# Patient Record
Sex: FEMALE | Race: BLACK OR AFRICAN AMERICAN | NOT HISPANIC OR LATINO | Employment: OTHER | URBAN - METROPOLITAN AREA
[De-identification: names, ages, dates, MRNs, and addresses within clinical notes are randomized per-mention and may not be internally consistent; named-entity substitution may affect disease eponyms.]

---

## 2017-01-06 DIAGNOSIS — M62.838 MUSCLE SPASM: ICD-10-CM

## 2017-01-06 RX ORDER — TIZANIDINE HYDROCHLORIDE 4 MG/1
1 CAPSULE, GELATIN COATED ORAL 2 TIMES DAILY PRN
Qty: 60 CAPSULE | Refills: 0 | Status: SHIPPED | OUTPATIENT
Start: 2017-01-06 | End: 2017-02-16

## 2017-01-06 NOTE — TELEPHONE ENCOUNTER
Two refill requests for tizanidine rec'd this morning; none recently previous to this.  Refill sent in, please let patient know. I do recommend that she use this with caution as with renal disease it is not cleared out of her system as well.

## 2017-01-06 NOTE — TELEPHONE ENCOUNTER
----- Message from Radha Ramon sent at 1/6/2017 10:05 AM CST -----  Contact: self/[phone]  Pt called regards to checking the status of her Rx for tizanidine 4 mg Cap. She is in bad pain. She need her med's.      Please advise

## 2017-01-09 ENCOUNTER — ANTI-COAG VISIT (OUTPATIENT)
Dept: CARDIOLOGY | Facility: CLINIC | Age: 57
End: 2017-01-09

## 2017-01-09 ENCOUNTER — LAB VISIT (OUTPATIENT)
Dept: LAB | Facility: HOSPITAL | Age: 57
End: 2017-01-09
Attending: INTERNAL MEDICINE
Payer: MEDICARE

## 2017-01-09 DIAGNOSIS — Z79.01 LONG-TERM (CURRENT) USE OF ANTICOAGULANTS: ICD-10-CM

## 2017-01-09 DIAGNOSIS — I48.19 PERSISTENT ATRIAL FIBRILLATION: ICD-10-CM

## 2017-01-09 DIAGNOSIS — I48.0 PAROXYSMAL ATRIAL FIBRILLATION: ICD-10-CM

## 2017-01-09 LAB
ANION GAP SERPL CALC-SCNC: 17 MMOL/L
APTT BLDCRRT: 34.6 SEC
BASOPHILS # BLD AUTO: 0.01 K/UL
BASOPHILS NFR BLD: 0.1 %
BUN SERPL-MCNC: 55 MG/DL
CALCIUM SERPL-MCNC: 9.4 MG/DL
CHLORIDE SERPL-SCNC: 90 MMOL/L
CO2 SERPL-SCNC: 28 MMOL/L
CREAT SERPL-MCNC: 9.2 MG/DL
DIFFERENTIAL METHOD: ABNORMAL
EOSINOPHIL # BLD AUTO: 0 K/UL
EOSINOPHIL NFR BLD: 0.4 %
ERYTHROCYTE [DISTWIDTH] IN BLOOD BY AUTOMATED COUNT: 14.6 %
EST. GFR  (AFRICAN AMERICAN): 5 ML/MIN/1.73 M^2
EST. GFR  (NON AFRICAN AMERICAN): 4.3 ML/MIN/1.73 M^2
GIANT PLATELETS BLD QL SMEAR: PRESENT
GLUCOSE SERPL-MCNC: 289 MG/DL
HCT VFR BLD AUTO: 36.3 %
HGB BLD-MCNC: 11.3 G/DL
INR PPP: 4.6
LYMPHOCYTES # BLD AUTO: 1.4 K/UL
LYMPHOCYTES NFR BLD: 15.1 %
MCH RBC QN AUTO: 30.5 PG
MCHC RBC AUTO-ENTMCNC: 31.1 %
MCV RBC AUTO: 98 FL
MONOCYTES # BLD AUTO: 0.6 K/UL
MONOCYTES NFR BLD: 6.4 %
NEUTROPHILS # BLD AUTO: 7.2 K/UL
NEUTROPHILS NFR BLD: 78 %
PLATELET # BLD AUTO: 297 K/UL
PLATELET BLD QL SMEAR: ABNORMAL
PMV BLD AUTO: 11.9 FL
POTASSIUM SERPL-SCNC: 4.7 MMOL/L
PROTHROMBIN TIME: 47.5 SEC
RBC # BLD AUTO: 3.7 M/UL
SODIUM SERPL-SCNC: 135 MMOL/L
WBC # BLD AUTO: 9.35 K/UL

## 2017-01-09 PROCEDURE — 80048 BASIC METABOLIC PNL TOTAL CA: CPT

## 2017-01-09 PROCEDURE — 36415 COLL VENOUS BLD VENIPUNCTURE: CPT

## 2017-01-09 PROCEDURE — 85025 COMPLETE CBC W/AUTO DIFF WBC: CPT

## 2017-01-09 PROCEDURE — 85610 PROTHROMBIN TIME: CPT

## 2017-01-09 PROCEDURE — 85730 THROMBOPLASTIN TIME PARTIAL: CPT

## 2017-01-10 ENCOUNTER — TELEPHONE (OUTPATIENT)
Dept: ELECTROPHYSIOLOGY | Facility: CLINIC | Age: 57
End: 2017-01-10

## 2017-01-10 NOTE — TELEPHONE ENCOUNTER
Nurse called and spoke to Ms Medel regarding her inr being 4/6 yesterday.  Ms Medel informed nurse that per instructions her last dose of coumadin was 1/7/17. Per Dr Adorno, will redraw inr tomorrow, before procedure, if inr >3 will have to reschedule again. Patient verbalized understanding.

## 2017-01-11 ENCOUNTER — ANTI-COAG VISIT (OUTPATIENT)
Dept: CARDIOLOGY | Facility: CLINIC | Age: 57
End: 2017-01-11

## 2017-01-11 DIAGNOSIS — I48.0 PAROXYSMAL ATRIAL FIBRILLATION: ICD-10-CM

## 2017-01-11 DIAGNOSIS — Z79.01 LONG-TERM (CURRENT) USE OF ANTICOAGULANTS: ICD-10-CM

## 2017-01-11 PROBLEM — I48.19 ATRIAL FIBRILLATION, PERSISTENT: Status: ACTIVE | Noted: 2017-01-11

## 2017-01-11 PROBLEM — N18.6 ESRD (END STAGE RENAL DISEASE): Status: ACTIVE | Noted: 2017-01-11

## 2017-01-13 DIAGNOSIS — Z79.01 LONG-TERM (CURRENT) USE OF ANTICOAGULANTS: ICD-10-CM

## 2017-01-13 DIAGNOSIS — I48.0 PAROXYSMAL ATRIAL FIBRILLATION: ICD-10-CM

## 2017-01-13 RX ORDER — WARFARIN SODIUM 5 MG/1
TABLET ORAL
Qty: 40 TABLET | Refills: 0 | Status: SHIPPED | OUTPATIENT
Start: 2017-01-13 | End: 2017-01-13 | Stop reason: SDUPTHER

## 2017-01-13 RX ORDER — WARFARIN SODIUM 5 MG/1
TABLET ORAL
Qty: 189 TABLET | Refills: 0 | Status: SHIPPED | OUTPATIENT
Start: 2017-01-13 | End: 2017-04-03 | Stop reason: SDUPTHER

## 2017-01-18 ENCOUNTER — CLINICAL SUPPORT (OUTPATIENT)
Dept: ELECTROPHYSIOLOGY | Facility: CLINIC | Age: 57
End: 2017-01-18
Payer: MEDICARE

## 2017-01-18 ENCOUNTER — ANTI-COAG VISIT (OUTPATIENT)
Dept: CARDIOLOGY | Facility: CLINIC | Age: 57
End: 2017-01-18
Payer: MEDICARE

## 2017-01-18 DIAGNOSIS — I48.0 PAROXYSMAL ATRIAL FIBRILLATION: ICD-10-CM

## 2017-01-18 DIAGNOSIS — Z79.01 LONG-TERM (CURRENT) USE OF ANTICOAGULANTS: Primary | ICD-10-CM

## 2017-01-18 DIAGNOSIS — I48.19 PERSISTENT ATRIAL FIBRILLATION: ICD-10-CM

## 2017-01-18 LAB
CTP QC/QA: NORMAL
INR PPP: 4.5 (ref 2–3)

## 2017-01-18 PROCEDURE — 93279 PRGRMG DEV EVAL PM/LDLS PM: CPT | Mod: S$GLB,,, | Performed by: INTERNAL MEDICINE

## 2017-01-18 PROCEDURE — 85610 PROTHROMBIN TIME: CPT | Mod: QW,S$GLB,, | Performed by: PHARMACIST

## 2017-01-18 PROCEDURE — 99211 OFF/OP EST MAY X REQ PHY/QHP: CPT | Mod: 25,S$GLB,, | Performed by: PHARMACIST

## 2017-01-18 NOTE — PROGRESS NOTES
INR elevated today.  Patient denies changes except completing course of keflex which does not typically cause DDI with warfarin.  She reports BRBPR with bowel movements yesterday, reports seen on tissue after wiping.  Hold and lower warfarin dose.  I advised patient to monitor bleeding closely and to call us with any increase in amount of blood or if dark in color.  She verbalized understanding.  Repeat INR next week.  Patient advised by student, Aaron, to contact clinic with any changes, questions, or concerns.

## 2017-01-20 ENCOUNTER — HOSPITAL ENCOUNTER (OUTPATIENT)
Dept: PULMONOLOGY | Facility: CLINIC | Age: 57
Discharge: HOME | End: 2017-01-20
Payer: MEDICARE

## 2017-01-20 ENCOUNTER — OFFICE VISIT (OUTPATIENT)
Dept: PULMONOLOGY | Facility: CLINIC | Age: 57
End: 2017-01-20
Payer: MEDICARE

## 2017-01-20 VITALS
HEART RATE: 66 BPM | RESPIRATION RATE: 16 BRPM | OXYGEN SATURATION: 98 % | BODY MASS INDEX: 41.07 KG/M2 | HEIGHT: 68 IN | SYSTOLIC BLOOD PRESSURE: 110 MMHG | WEIGHT: 271 LBS | DIASTOLIC BLOOD PRESSURE: 56 MMHG

## 2017-01-20 DIAGNOSIS — D86.0 SARCOIDOSIS OF LUNG: ICD-10-CM

## 2017-01-20 DIAGNOSIS — Z99.2 ESRD ON HEMODIALYSIS: Chronic | ICD-10-CM

## 2017-01-20 DIAGNOSIS — D86.9 SARCOID: Chronic | ICD-10-CM

## 2017-01-20 DIAGNOSIS — N18.6 ESRD ON HEMODIALYSIS: Chronic | ICD-10-CM

## 2017-01-20 DIAGNOSIS — I50.42 CHRONIC COMBINED SYSTOLIC AND DIASTOLIC HEART FAILURE: Primary | Chronic | ICD-10-CM

## 2017-01-20 DIAGNOSIS — K21.00 GASTROESOPHAGEAL REFLUX DISEASE WITH ESOPHAGITIS: ICD-10-CM

## 2017-01-20 PROBLEM — K21.9 GERD (GASTROESOPHAGEAL REFLUX DISEASE): Status: ACTIVE | Noted: 2017-01-20

## 2017-01-20 LAB
PRE FEV1 FVC: 84
PRE FEV1: 1.66
PRE FVC: 1.97
PREDICTED FEV1 FVC: 79
PREDICTED FEV1: 2.83
PREDICTED FVC: 3.54

## 2017-01-20 PROCEDURE — 94729 DIFFUSING CAPACITY: CPT | Mod: S$GLB,,, | Performed by: INTERNAL MEDICINE

## 2017-01-20 PROCEDURE — 99999 PR PBB SHADOW E&M-EST. PATIENT-LVL III: CPT | Mod: PBBFAC,,, | Performed by: INTERNAL MEDICINE

## 2017-01-20 PROCEDURE — 1159F MED LIST DOCD IN RCRD: CPT | Mod: S$GLB,,, | Performed by: INTERNAL MEDICINE

## 2017-01-20 PROCEDURE — 99214 OFFICE O/P EST MOD 30 MIN: CPT | Mod: 25,S$GLB,, | Performed by: INTERNAL MEDICINE

## 2017-01-20 PROCEDURE — 99499 UNLISTED E&M SERVICE: CPT | Mod: S$GLB,,, | Performed by: INTERNAL MEDICINE

## 2017-01-20 PROCEDURE — 94010 BREATHING CAPACITY TEST: CPT | Mod: S$GLB,,, | Performed by: INTERNAL MEDICINE

## 2017-01-20 RX ORDER — OMEPRAZOLE 20 MG/1
20 CAPSULE, DELAYED RELEASE ORAL DAILY
Qty: 30 CAPSULE | Refills: 5 | Status: SHIPPED | OUTPATIENT
Start: 2017-01-20 | End: 2017-07-20 | Stop reason: SDUPTHER

## 2017-01-20 NOTE — MR AVS SNAPSHOT
[facility]  [address]  [facility] [address]  Phone: [phone]                  Sisi Medel   2017 2:00 PM   Office Visit    Description:  Female : 1960   Provider:  VINAY Harvey MD   Department:  [facility] Pul[facility]           Diagnoses this Visit        Comments    Chronic combined systolic and diastolic heart failure    -  Primary     Gastroesophageal reflux disease with esophagitis         ESRD on hemodialysis         Obesity, Class II, BMI 35-39.9, with comorbidity         Sarcoid                To Do List           Future Appointments        Provider Department Dept Phone    2017 7:00 AM LAB, APPOINTMENT [facility] [phone]    2017 8:00 AM Carlos A Caputo MD [facility] - Internal Medicine [phone]    2017 8:00 AM HOME MONITOR DEVICE CHECK, [facility] - Arrhythmia [phone]    2017 12:30 PM EKG, APPT [facility] - [phone]    2017 1:00 PM PACEMAKER, ICD [facility] Arrhythmia [phone]      Goals (5 Years of Data)     None       These Medications        Disp Refills Start End    omeprazole (PRILOSEC) 20 MG capsule 30 capsule 5 2017    Take 1 capsule (20 mg total) by mouth once daily. - Oral    Pharmacy: [facility] [address] [place] [address] AT [facility] Ph #: [phone]         [facility] On Call     [facility] Care [facility] -  Assistance  Registered nurses in the [facility] provide clinical advisement, health education, appointment booking, and other advisory services.  Call for this free service at [phone].             Medications           Message regarding Medications     Verify the changes and/or additions to your medication regime listed below are the same as discussed with your clinician today.  If any of these changes or additions are incorrect,  please notify your healthcare provider.        START taking these NEW medications        Refills    omeprazole (PRILOSEC) 20 MG capsule 5    Sig: Take 1 capsule (20 mg total) by mouth once daily.    Class: Normal    Route: Oral           Verify that the below list of medications is an accurate representation of the medications you are currently taking.  If none reported, the list may be blank. If incorrect, please contact your healthcare provider. Carry this list with you in case of emergency.           Current Medications     ACZONE 5 % topical gel Apply to face every morning    allopurinol (ZYLOPRIM) 100 MG tablet Take 1 tablet (100 mg total) by mouth once daily.    ammonium lactate (LAC-HYDRIN) 12 % lotion Apply topically as needed (to legs).     aspirin 81 MG Chew Take 81 mg by mouth every morning.    atorvastatin (LIPITOR) 80 MG tablet Take 1 tablet (80 mg total) by mouth once daily.    gabapentin (NEURONTIN) 300 MG capsule Take 1 capsule (300 mg total) by mouth 3 (three) times daily.    ketorolac 0.5% (ACULAR) 0.5 % Drop instill one drop into both eyes every morning    lactulose (CHRONULAC) 20 gram/30 mL Soln Take 15 mLs (10 g total) by mouth 3 (three) times daily as needed (for constipation).    levetiracetam (KEPPRA) 500 MG Tab Take 1 tablet (500 mg total) by mouth 2 (two) times daily.    metoprolol tartrate (LOPRESSOR) 25 MG tablet Take 1 tablet (25 mg total) by mouth 2 (two) times daily.    omeprazole (PRILOSEC) 20 MG capsule Take 1 capsule (20 mg total) by mouth once daily.    prednisoLONE acetate (PRED FORTE) 1 % DrpS INSTILL ONE DROP INTO  LEFT EYE  TWICE A DAY    predniSONE (DELTASONE) 10 MG tablet Takes one 10 mg tablet every morning    RENVELA 800 mg Tab TAKE 1 TABLET BY MOUTH THREE TIMES DAILY WITH MEALS    tizanidine 4 mg Cap Take 1 capsule by mouth 2 (two) times daily as needed.    warfarin (COUMADIN) 5 MG tablet TAKE 1 TABLET BY MOUTH EVERYDAY EXCEPT 1 AND 1/2 TABLETS ON MONDAY AND FRIDAY OR AS  "DIRECTED BY CLINIC           Clinical Reference Information           Vital Signs - Last Recorded  Most recent update: 1/20/2017  1:29 PM by Viviana Moreno MA    BP Pulse Ht Wt LMP SpO2    (!) 110/56 66 5' 8" (1.727 m) 122.9 kg (271 lb) (LMP Unknown) 98%    BMI                41.21 kg/m2          Blood Pressure          Most Recent Value    BP  (!)  110/56      Allergies as of 1/20/2017     Bactrim [Sulfamethoxazole-trimethoprim]    Nsaids (Non-steroidal Anti-inflammatory Drug)      Immunizations Administered on Date of Encounter - 1/20/2017     None      Maintenance Dialysis History     Start End Type Comments Center    2/17/2016  [facility]            Current Dialysis Center Information     [facility] [address] Phone #:  [phone]    Contact:  [place]  [address] Fax #:  [phone]            [facility] Sign-Up     Activating your [facility] account is as easy as 1-2-3!     1) Visit [email or web address], select Sign Up Now, enter this activation code and your date of birth, then select Next.  [email or web address]  Expires: 2/26/2017  1:57 PM      2) Create a username and password to use when you visit [facility] in the future and select a security question in case you lose your password and select Next.    3) Enter your e-mail address and click Sign Up!    Additional Information  If you have questions, please e-mail [email or web address] or call [phone] to talk to our [facility] staff. Remember, [facility] is NOT to be used for urgent needs. For medical emergencies, dial 911.         "

## 2017-01-21 ENCOUNTER — HOSPITAL ENCOUNTER (OUTPATIENT)
Facility: HOSPITAL | Age: 57
Discharge: HOME | End: 2017-01-22
Attending: EMERGENCY MEDICINE | Admitting: INTERNAL MEDICINE
Payer: MEDICARE

## 2017-01-21 DIAGNOSIS — I95.9 HYPOTENSION, UNSPECIFIED HYPOTENSION TYPE: ICD-10-CM

## 2017-01-21 DIAGNOSIS — N18.6 ESRD ON HEMODIALYSIS: Chronic | ICD-10-CM

## 2017-01-21 DIAGNOSIS — T82.9XXA COMPLICATIONS DUE TO RENAL DIALYSIS DEVICE, IMPLANT, AND GRAFT: ICD-10-CM

## 2017-01-21 DIAGNOSIS — I95.9 HYPOTENSION: Primary | ICD-10-CM

## 2017-01-21 DIAGNOSIS — Z99.2 ESRD ON HEMODIALYSIS: Chronic | ICD-10-CM

## 2017-01-21 DIAGNOSIS — T82.898A OTHER COMPLICATIONS DUE TO RENAL DIALYSIS DEVICE, IMPLANT, AND GRAFT: ICD-10-CM

## 2017-01-21 LAB
ABO + RH BLD: NORMAL
ALBUMIN SERPL BCP-MCNC: 2.9 G/DL
ALLENS TEST: ABNORMAL
ALP SERPL-CCNC: 90 U/L
ALT SERPL W/O P-5'-P-CCNC: <5 U/L
ANION GAP SERPL CALC-SCNC: 12 MMOL/L
APTT BLDCRRT: 30.8 SEC
AST SERPL-CCNC: 16 U/L
BASOPHILS # BLD AUTO: 0.01 K/UL
BASOPHILS NFR BLD: 0.1 %
BILIRUB SERPL-MCNC: 0.5 MG/DL
BLD GP AB SCN CELLS X3 SERPL QL: NORMAL
BUN SERPL-MCNC: 52 MG/DL
CALCIUM SERPL-MCNC: 9.2 MG/DL
CHLORIDE SERPL-SCNC: 96 MMOL/L
CO2 SERPL-SCNC: 27 MMOL/L
CREAT SERPL-MCNC: 7.6 MG/DL
DELSYS: ABNORMAL
DIFFERENTIAL METHOD: ABNORMAL
EOSINOPHIL # BLD AUTO: 0.1 K/UL
EOSINOPHIL NFR BLD: 0.9 %
ERYTHROCYTE [DISTWIDTH] IN BLOOD BY AUTOMATED COUNT: 15.8 %
EST. GFR  (AFRICAN AMERICAN): 6.3 ML/MIN/1.73 M^2
EST. GFR  (NON AFRICAN AMERICAN): 5.5 ML/MIN/1.73 M^2
GLUCOSE SERPL-MCNC: 160 MG/DL
HCO3 UR-SCNC: 28.6 MMOL/L (ref 24–28)
HCT VFR BLD AUTO: 30.7 %
HCT VFR BLD CALC: 31 %PCV (ref 36–54)
HGB BLD-MCNC: 9.7 G/DL
INR PPP: 2.1
LACTATE SERPL-SCNC: 1.8 MMOL/L
LYMPHOCYTES # BLD AUTO: 1.7 K/UL
LYMPHOCYTES NFR BLD: 17 %
MAGNESIUM SERPL-MCNC: 2.2 MG/DL
MCH RBC QN AUTO: 31.2 PG
MCHC RBC AUTO-ENTMCNC: 31.6 %
MCV RBC AUTO: 99 FL
MONOCYTES # BLD AUTO: 1.3 K/UL
MONOCYTES NFR BLD: 13.5 %
NEUTROPHILS # BLD AUTO: 6.6 K/UL
NEUTROPHILS NFR BLD: 68.5 %
PCO2 BLDA: 45.9 MMHG (ref 35–45)
PH SMN: 7.4 [PH] (ref 7.35–7.45)
PHOSPHATE SERPL-MCNC: 3.2 MG/DL
PLATELET # BLD AUTO: 267 K/UL
PMV BLD AUTO: 10.8 FL
PO2 BLDA: 58 MMHG (ref 40–60)
POC BE: 4 MMOL/L
POC IONIZED CALCIUM: 1.21 MMOL/L (ref 1.06–1.42)
POC SATURATED O2: 90 % (ref 95–100)
POC TCO2: 30 MMOL/L (ref 24–29)
POTASSIUM BLD-SCNC: 4.2 MMOL/L (ref 3.5–5.1)
POTASSIUM SERPL-SCNC: 4.4 MMOL/L
PROT SERPL-MCNC: 6.4 G/DL
PROTHROMBIN TIME: 21.5 SEC
RBC # BLD AUTO: 3.11 M/UL
SAMPLE: ABNORMAL
SITE: ABNORMAL
SODIUM BLD-SCNC: 134 MMOL/L (ref 136–145)
SODIUM SERPL-SCNC: 135 MMOL/L
T4 FREE SERPL-MCNC: 0.85 NG/DL
TROPONIN I SERPL DL<=0.01 NG/ML-MCNC: 0.14 NG/ML
TSH SERPL DL<=0.005 MIU/L-ACNC: 0.26 UIU/ML
WBC # BLD AUTO: 9.71 K/UL

## 2017-01-21 PROCEDURE — 27000221 HC OXYGEN, UP TO 24 HOURS

## 2017-01-21 PROCEDURE — 85610 PROTHROMBIN TIME: CPT

## 2017-01-21 PROCEDURE — 25000003 PHARM REV CODE 250: Performed by: PHYSICIAN ASSISTANT

## 2017-01-21 PROCEDURE — G0257 UNSCHED DIALYSIS ESRD PT HOS: HCPCS

## 2017-01-21 PROCEDURE — 96361 HYDRATE IV INFUSION ADD-ON: CPT

## 2017-01-21 PROCEDURE — 99220 PR INITIAL OBSERVATION CARE,LEVL III: CPT | Mod: ,,, | Performed by: PHYSICIAN ASSISTANT

## 2017-01-21 PROCEDURE — 83735 ASSAY OF MAGNESIUM: CPT

## 2017-01-21 PROCEDURE — 96360 HYDRATION IV INFUSION INIT: CPT

## 2017-01-21 PROCEDURE — 99214 OFFICE O/P EST MOD 30 MIN: CPT | Mod: 25,,, | Performed by: INTERNAL MEDICINE

## 2017-01-21 PROCEDURE — 84439 ASSAY OF FREE THYROXINE: CPT

## 2017-01-21 PROCEDURE — 36592 COLLECT BLOOD FROM PICC: CPT

## 2017-01-21 PROCEDURE — 86901 BLOOD TYPING SEROLOGIC RH(D): CPT

## 2017-01-21 PROCEDURE — 90935 HEMODIALYSIS ONE EVALUATION: CPT | Mod: ,,, | Performed by: INTERNAL MEDICINE

## 2017-01-21 PROCEDURE — 93005 ELECTROCARDIOGRAM TRACING: CPT

## 2017-01-21 PROCEDURE — 83605 ASSAY OF LACTIC ACID: CPT

## 2017-01-21 PROCEDURE — 63600175 PHARM REV CODE 636 W HCPCS: Performed by: PHYSICIAN ASSISTANT

## 2017-01-21 PROCEDURE — 84132 ASSAY OF SERUM POTASSIUM: CPT

## 2017-01-21 PROCEDURE — 85025 COMPLETE CBC W/AUTO DIFF WBC: CPT

## 2017-01-21 PROCEDURE — 84484 ASSAY OF TROPONIN QUANT: CPT

## 2017-01-21 PROCEDURE — 93010 ELECTROCARDIOGRAM REPORT: CPT | Mod: ,,, | Performed by: INTERNAL MEDICINE

## 2017-01-21 PROCEDURE — 84443 ASSAY THYROID STIM HORMONE: CPT

## 2017-01-21 PROCEDURE — 80053 COMPREHEN METABOLIC PANEL: CPT

## 2017-01-21 PROCEDURE — 80100016 HC MAINTENANCE HEMODIALYSIS

## 2017-01-21 PROCEDURE — 25000003 PHARM REV CODE 250: Performed by: EMERGENCY MEDICINE

## 2017-01-21 PROCEDURE — 87040 BLOOD CULTURE FOR BACTERIA: CPT

## 2017-01-21 PROCEDURE — 25000003 PHARM REV CODE 250: Performed by: INTERNAL MEDICINE

## 2017-01-21 PROCEDURE — 99285 EMERGENCY DEPT VISIT HI MDM: CPT | Mod: 25

## 2017-01-21 PROCEDURE — 84100 ASSAY OF PHOSPHORUS: CPT

## 2017-01-21 PROCEDURE — 85730 THROMBOPLASTIN TIME PARTIAL: CPT

## 2017-01-21 PROCEDURE — G0378 HOSPITAL OBSERVATION PER HR: HCPCS

## 2017-01-21 PROCEDURE — 86900 BLOOD TYPING SEROLOGIC ABO: CPT

## 2017-01-21 PROCEDURE — 99285 EMERGENCY DEPT VISIT HI MDM: CPT | Mod: ,,, | Performed by: EMERGENCY MEDICINE

## 2017-01-21 RX ORDER — ACETAMINOPHEN 325 MG/1
650 TABLET ORAL EVERY 6 HOURS PRN
Status: DISCONTINUED | OUTPATIENT
Start: 2017-01-21 | End: 2017-01-22 | Stop reason: HOSPADM

## 2017-01-21 RX ORDER — RAMELTEON 8 MG/1
8 TABLET ORAL NIGHTLY PRN
Status: DISCONTINUED | OUTPATIENT
Start: 2017-01-21 | End: 2017-01-22 | Stop reason: HOSPADM

## 2017-01-21 RX ORDER — GABAPENTIN 300 MG/1
300 CAPSULE ORAL 3 TIMES DAILY
Status: DISCONTINUED | OUTPATIENT
Start: 2017-01-21 | End: 2017-01-22 | Stop reason: HOSPADM

## 2017-01-21 RX ORDER — SODIUM CHLORIDE 9 MG/ML
INJECTION, SOLUTION INTRAVENOUS
Status: DISCONTINUED | OUTPATIENT
Start: 2017-01-21 | End: 2017-01-22 | Stop reason: HOSPADM

## 2017-01-21 RX ORDER — WARFARIN SODIUM 5 MG/1
5 TABLET ORAL
Status: DISCONTINUED | OUTPATIENT
Start: 2017-01-21 | End: 2017-01-22 | Stop reason: HOSPADM

## 2017-01-21 RX ORDER — MIDODRINE HYDROCHLORIDE 5 MG/1
10 TABLET ORAL
Status: COMPLETED | OUTPATIENT
Start: 2017-01-21 | End: 2017-01-21

## 2017-01-21 RX ORDER — ATORVASTATIN CALCIUM 20 MG/1
80 TABLET, FILM COATED ORAL NIGHTLY
Status: DISCONTINUED | OUTPATIENT
Start: 2017-01-21 | End: 2017-01-22 | Stop reason: HOSPADM

## 2017-01-21 RX ORDER — WARFARIN SODIUM 5 MG/1
5 TABLET ORAL
Status: DISCONTINUED | OUTPATIENT
Start: 2017-01-25 | End: 2017-01-22 | Stop reason: HOSPADM

## 2017-01-21 RX ORDER — WARFARIN SODIUM 5 MG/1
5 TABLET ORAL DAILY
Status: DISCONTINUED | OUTPATIENT
Start: 2017-01-21 | End: 2017-01-21

## 2017-01-21 RX ORDER — POLYETHYLENE GLYCOL 3350 17 G/17G
17 POWDER, FOR SOLUTION ORAL EVERY 12 HOURS PRN
Status: DISCONTINUED | OUTPATIENT
Start: 2017-01-21 | End: 2017-01-22 | Stop reason: HOSPADM

## 2017-01-21 RX ORDER — ONDANSETRON 8 MG/1
8 TABLET, ORALLY DISINTEGRATING ORAL EVERY 8 HOURS PRN
Status: DISCONTINUED | OUTPATIENT
Start: 2017-01-21 | End: 2017-01-22 | Stop reason: HOSPADM

## 2017-01-21 RX ORDER — SODIUM CHLORIDE 9 MG/ML
INJECTION, SOLUTION INTRAVENOUS ONCE
Status: COMPLETED | OUTPATIENT
Start: 2017-01-21 | End: 2017-01-21

## 2017-01-21 RX ORDER — SEVELAMER CARBONATE 800 MG/1
800 TABLET, FILM COATED ORAL
Status: DISCONTINUED | OUTPATIENT
Start: 2017-01-21 | End: 2017-01-22 | Stop reason: HOSPADM

## 2017-01-21 RX ORDER — WARFARIN SODIUM 5 MG/1
5 TABLET ORAL
Status: DISCONTINUED | OUTPATIENT
Start: 2017-01-22 | End: 2017-01-22 | Stop reason: HOSPADM

## 2017-01-21 RX ORDER — PREDNISONE 10 MG/1
10 TABLET ORAL DAILY
Status: DISCONTINUED | OUTPATIENT
Start: 2017-01-21 | End: 2017-01-22 | Stop reason: HOSPADM

## 2017-01-21 RX ORDER — LEVETIRACETAM 500 MG/1
500 TABLET ORAL 2 TIMES DAILY
Status: DISCONTINUED | OUTPATIENT
Start: 2017-01-21 | End: 2017-01-22 | Stop reason: HOSPADM

## 2017-01-21 RX ORDER — PANTOPRAZOLE SODIUM 40 MG/1
40 TABLET, DELAYED RELEASE ORAL DAILY
Status: DISCONTINUED | OUTPATIENT
Start: 2017-01-22 | End: 2017-01-22 | Stop reason: HOSPADM

## 2017-01-21 RX ORDER — NAPROXEN SODIUM 220 MG/1
81 TABLET, FILM COATED ORAL EVERY MORNING
Status: DISCONTINUED | OUTPATIENT
Start: 2017-01-21 | End: 2017-01-22 | Stop reason: HOSPADM

## 2017-01-21 RX ORDER — TIZANIDINE 2 MG/1
4 TABLET ORAL 2 TIMES DAILY
Status: DISCONTINUED | OUTPATIENT
Start: 2017-01-21 | End: 2017-01-22 | Stop reason: HOSPADM

## 2017-01-21 RX ORDER — PREDNISOLONE ACETATE 10 MG/ML
1 SUSPENSION/ DROPS OPHTHALMIC 2 TIMES DAILY
Status: DISCONTINUED | OUTPATIENT
Start: 2017-01-21 | End: 2017-01-22 | Stop reason: HOSPADM

## 2017-01-21 RX ORDER — KETOROLAC TROMETHAMINE 5 MG/ML
1 SOLUTION OPHTHALMIC DAILY
Status: DISCONTINUED | OUTPATIENT
Start: 2017-01-21 | End: 2017-01-22 | Stop reason: HOSPADM

## 2017-01-21 RX ADMIN — GABAPENTIN 300 MG: 300 CAPSULE ORAL at 10:01

## 2017-01-21 RX ADMIN — PREDNISOLONE ACETATE 1 DROP: 10 SUSPENSION/ DROPS OPHTHALMIC at 10:01

## 2017-01-21 RX ADMIN — WARFARIN SODIUM 5 MG: 5 TABLET ORAL at 06:01

## 2017-01-21 RX ADMIN — SODIUM CHLORIDE 3579 ML: 0.9 INJECTION, SOLUTION INTRAVENOUS at 08:01

## 2017-01-21 RX ADMIN — MIDODRINE HYDROCHLORIDE 10 MG: 5 TABLET ORAL at 01:01

## 2017-01-21 RX ADMIN — PREDNISONE 10 MG: 10 TABLET ORAL at 06:01

## 2017-01-21 RX ADMIN — SODIUM CHLORIDE 300 ML: 0.9 INJECTION, SOLUTION INTRAVENOUS at 04:01

## 2017-01-21 RX ADMIN — SEVELAMER CARBONATE 800 MG: 800 TABLET, FILM COATED ORAL at 06:01

## 2017-01-21 RX ADMIN — TIZANIDINE 4 MG: 2 TABLET ORAL at 06:01

## 2017-01-21 RX ADMIN — KETOROLAC TROMETHAMINE 1 DROP: 5 SOLUTION OPHTHALMIC at 10:01

## 2017-01-21 RX ADMIN — LEVETIRACETAM 500 MG: 500 TABLET, FILM COATED ORAL at 06:01

## 2017-01-21 RX ADMIN — GABAPENTIN 300 MG: 300 CAPSULE ORAL at 06:01

## 2017-01-21 RX ADMIN — ATORVASTATIN CALCIUM 80 MG: 20 TABLET, FILM COATED ORAL at 10:01

## 2017-01-21 NOTE — CONSULTS
Cardiology ER Consult Note  Attending Physician: Graham Granger MD  Reason for Consult: hypotension in patient with recent pacemaker implantation    HPI:   56 y.o. woman with history of ESRD on HD, HTN, pHTN on O2, sarcoidosis, paroxysmal AFIB, s/p recent (1/11/17) BiV pacemaker implanted in anticipation of AVN RFA who presents to the ED with complaints of fatigue and hypotension. She notes that she presented to her dialysis center; however, she did not receive any HD 2/2 systolic BP in the 70s. She denies any fevers, chills, night sweats, syncope, chest pain, SOB, orthopnea, or PND. She has had issues with her BP before in the context of HD.  ROS:    Constitution: negative for - fever, chills, weight loss or weight gain  HENT: negative for - sore throat, rhinorrhea, or headache  Eyes: negative for - blurred or double vision  Cardiovascular: no chest pain or dyspnea on exertion  Pulmonary: no cough, shortness of breath, or wheezing  Gastrointestinal: negative for - abdominal pain, nausea, vomiting, or diarrhea  : negative for - dysuria  Neurological: negative for - focal weakness or sensory changes  PMH:     Past Medical History   Diagnosis Date    Anemia in ESRD (end-stage renal disease) 3/7/2016    Anticoagulant long-term use     Cervical radiculopathy 7/20/2015    CHF (congestive heart failure)     Chronic combined systolic and diastolic heart failure 6/14/2013     EF 20% 2013, improved with Medical therapy, negative PET 2013    Chronic respiratory failure with hypoxia 04/22/2013     On home oxygen at 2-5 liters    Chronic rhinitis 10/2/2013    DDD (degenerative disc disease), lumbar 6/17/2015    ESRD on hemodialysis 2/23/2016    Essential hypertension     Gout     Hyperlipidemia 3/7/2016    Lateral meniscal tear 5/31/2016    Lumbar stenosis 6/17/2015    Morbid obesity 8/15/2013    Paroxysmal atrial fibrillation 5/16/2014     Not on anticoagulation    Peripheral neuropathy 11/13/2013     Personal history of fall 2014    Personal history of gastric ulcer 3/19/2013    Sarcoidosis, lung 2009     Diagnosed in  with ocular manifestation, on 4L home O2 and PO steroids    Secondary pulmonary hypertension 2015    Seizure disorder 3/19/2013    Seizures     Shingles 2013    Spondylarthrosis 2015    Thyroid disease      Past Surgical History   Procedure Laterality Date    Abdominal surgery      Vascular surgery       section       x2    Other surgical history       loop recorder implant    Loop recorder       Allergies:     Review of patient's allergies indicates:   Allergen Reactions    Bactrim [sulfamethoxazole-trimethoprim] Other (See Comments)     Causes renal failure    Nsaids (non-steroidal anti-inflammatory drug) Other (See Comments)     Renal failure     Medications:     No current facility-administered medications on file prior to encounter.      Current Outpatient Prescriptions on File Prior to Encounter   Medication Sig Dispense Refill    ACZONE 5 % topical gel Apply to face every morning  2    allopurinol (ZYLOPRIM) 100 MG tablet Take 1 tablet (100 mg total) by mouth once daily. (Patient taking differently: Take 100 mg by mouth every morning. ) 30 tablet 3    ammonium lactate (LAC-HYDRIN) 12 % lotion Apply topically as needed (to legs).   2    aspirin 81 MG Chew Take 81 mg by mouth every morning.      atorvastatin (LIPITOR) 80 MG tablet Take 1 tablet (80 mg total) by mouth once daily. (Patient taking differently: Take 80 mg by mouth nightly. ) 90 tablet 1    gabapentin (NEURONTIN) 300 MG capsule Take 1 capsule (300 mg total) by mouth 3 (three) times daily. 270 capsule 0    ketorolac 0.5% (ACULAR) 0.5 % Drop instill one drop into both eyes every morning  3    lactulose (CHRONULAC) 20 gram/30 mL Soln Take 15 mLs (10 g total) by mouth 3 (three) times daily as needed (for constipation). 450 mL 1    levetiracetam (KEPPRA) 500 MG Tab Take 1  tablet (500 mg total) by mouth 2 (two) times daily. 60 tablet 5    metoprolol tartrate (LOPRESSOR) 25 MG tablet Take 1 tablet (25 mg total) by mouth 2 (two) times daily. 60 tablet 11    omeprazole (PRILOSEC) 20 MG capsule Take 1 capsule (20 mg total) by mouth once daily. 30 capsule 5    prednisoLONE acetate (PRED FORTE) 1 % DrpS INSTILL ONE DROP INTO  LEFT EYE  TWICE A DAY  3    predniSONE (DELTASONE) 10 MG tablet Takes one 10 mg tablet every morning 60 tablet 3    RENVELA 800 mg Tab TAKE 1 TABLET BY MOUTH THREE TIMES DAILY WITH MEALS 90 tablet 0    tizanidine 4 mg Cap Take 1 capsule by mouth 2 (two) times daily as needed. 60 capsule 0    warfarin (COUMADIN) 5 MG tablet TAKE 1 TABLET BY MOUTH EVERYDAY EXCEPT 1 AND 1/2 TABLETS ON MONDAY AND FRIDAY OR AS DIRECTED BY CLINIC 189 tablet 0       Inpatient Medications   Continuous Infusions:  Scheduled Meds:  PRN Meds:     Social History:     Social History   Substance Use Topics    Smoking status: Former Smoker     Packs/day: 0.50     Years: 10.00     Types: Cigarettes     Quit date: 4/22/1994    Smokeless tobacco: Never Used    Alcohol use No     Family History:     Family History   Problem Relation Age of Onset    Kidney failure Mother     Hypertension Mother     Diabetes Mother     Coronary artery disease Father     Hypertension Father     Diabetes Father     Lupus Sister     Diabetes Maternal Grandmother     Colon cancer Neg Hx     Ovarian cancer Neg Hx     Breast cancer Neg Hx     Melanoma Neg Hx      Physical Exam:     Vitals:  Temp:  [97.7 °F (36.5 °C)]   Pulse:  [60-70]   Resp:  [16-18]   BP: ()/(55-72)   SpO2:  [98 %-100 %]  I/O's:  No intake or output data in the 24 hours ending 01/21/17 1101     Constitutional: NAD, conversant  HEENT: Sclera anicteric, PERRLA, EOMI  Neck: No JVD, no carotid bruits  CV: Irregularly irregular, no murmur, normal S1/S2  Pulm: CTA in bilateral anterior lung fields  GI: Abdomen soft, NTND,  +BS  Extremities: No LE edema, warm and well perfused  Skin: No ecchymosis, erythema, or ulcers  Psych: AOx3, appropriate affect  Neuro: CNII-XII intact, no focal deficits    Labs:       Recent Labs  Lab 01/21/17  0801   *   K 4.4   CL 96   CO2 27   BUN 52*   CREATININE 7.6*   *   ANIONGAP 12       Recent Labs  Lab 01/21/17  0801   TROPONINI 0.135*      Recent Labs  Lab 01/21/17  0801 01/21/17  0801   WBC 9.71  --    HGB 9.7*  --    HCT 30.7* 31*     --        Recent Labs  Lab 01/21/17  0801   AST 16   ALT <5*   ALKPHOS 90   BILITOT 0.5   ALBUMIN 2.9*        Imaging:   TTE: 6/2016  CONCLUSIONS     1 - Upper limit of normal left ventricular enlargement.     2 - Low normal to mildly depressed left ventricular systolic function (EF 50-55%).     3 - Eccentric hypertrophy.     4 - Left ventricular diastolic dysfunction.     5 - Biatrial enlargement.     6 - Normal right ventricular systolic function .     7 - Trivial pulmonic regurgitation.     8 - Intermediate central venous pressure.    Bedside limited echo: preserved EF, no evidence of pericardial effusion    PPM interrogation:  Mode: VVI  Base Rate 60 bpm  Ventricular pacing: RV -> LV, 40 ms    RV lead: threshold 0.75V (0.75 V on 1/18/17), sense > 12 mV (>12 mV on 1/18/17), and impedence 600 ohms (610 ohms on 1/18/17)    LV lead: threshold 2.25 V (D1 to can- similar to 1/18/17 when it was 2.25 V as well), lead impedence 630 ohms (630 ohms on 1/18/17)    No episodes of RVR since 1/18/17    EF   Date Value Ref Range Status   06/27/2016 65 55 - 65    06/26/2016 50 55 - 65    12/15/2015 50 55 - 65    07/09/2015 35 (A) 55 - 65    07/06/2015 50 55 - 65          EKG: underlying AFIB, rate 66 bpm, intermittent BiV pacing detected    Assessment:   56 y.o. woman with history of ESRD on HD, HTN, pHTN on O2, sarcoidosis, paroxysmal AFIB, s/p recent (1/11/17) BiV pacemaker implanted in anticipation of AVN RFA who presents to the ED with complaints of fatigue  and hypotension    Plan:   Hypotension:  - no pericardial effusion on limited bedside echo, pacemaker is functioning appropriately  - trop elevation is chronic and in context of ESRD, denies any chest pain or other anginal equivalent- no need to continue to trend unless clinically changes  - patient has h/o hypotension veto-HD, recommend optimization of fluid status per medicine/nephrology    Discussed with Dr Jensen.    Signed:  Tien Mccloud MD  Cardiology Fellow, PGY-6  Pager: [phone]  1/21/2017 11:01 AM

## 2017-01-21 NOTE — PLAN OF CARE
Problem: Patient Care Overview  Goal: Plan of Care Review  Outcome: Ongoing (interventions implemented as appropriate)  3hr HD treatment completed 1L of fluid removed pt tolerated well. Both needles of a RAISSA graft removed and pressure held until hemostasis achieved dressing applied. Report given to CATHRYN Granger RN.

## 2017-01-21 NOTE — PROGRESS NOTES
Subjective:       Patient ID: Sisi Medel is a 56 y.o. female.    Chief Complaint: Sarcoidosis    HPI HISTORY OF PRESENT ILLNESS:  Mrs. Medel is a 56-year-old former smoker who   comes for an interval assessment of sarcoidosis.  Since her visit here in   September, she has had multiple hospital admissions.  She was in the hospital in November;   and at that time, treated for a suspected pneumonia.  Most recently, she was   admitted for placement of a cardiac pacemaker.    Today, Mrs. Medel reports that she is having a modest cough.  This has been   mostly nonproductive.  She has not been aware of any associated wheezing.  She   has not had any pleuritic or exertional chest pain.  She is having frequent   symptoms of heartburn.  She has not taken any medications for this problem.    Mrs. Medel remains on a 10 mg maintenance dose of prednisone.  She also is   using oxygen at 2 L per minute.  She is maintained on dialysis for treatment of   end-stage renal disease.    DATA:  I have reviewed the results of pulmonary function studies done today.    The forced vital capacity is 1.97 L, which is 56% of predicted.  The FEV1 is   1.66 L, or 59% of predicted.  The ratio of these values is 84%.  The diffusion   capacity is 8.3.  This value will be adjusted upward modestly due to her mild   anemia.  When today's study is compared to previous studies dating back over the   past 12 months, the current spirometry values are within the range of values   seen in prior studies.  The diffusion capacity appears unchanged from prior   values.      CB/HN  dd: 01/20/2017 21:15:44 (CST)  td: 01/21/2017 06:22:24 (CST)  Doc ID   #[id number]  Job ID #[id number]    CC:       Review of Systems   Constitutional: Positive for fatigue. Negative for fever.   HENT: Positive for postnasal drip and congestion. Negative for sinus pressure and voice change.    Respiratory: Positive for cough, shortness of breath and dyspnea on extertion.  Negative for sputum production and wheezing.    Cardiovascular: Positive for leg swelling. Negative for chest pain.   Genitourinary: Negative for difficulty urinating.   Musculoskeletal: Positive for arthralgias. Negative for back pain.   Skin: Negative for rash.   Gastrointestinal: Positive for abdominal pain. Negative for acid reflux.   Neurological: Negative for dizziness and weakness.   Hematological: Negative for adenopathy.       Objective:      Physical Exam   Constitutional: She is oriented to person, place, and time. She appears well-developed.   Chronically ill appearing, here in wheelchair. She is obese.   HENT:   Head: Normocephalic.   Nose: Nose normal.   Mouth/Throat: No oropharyngeal exudate.   Neck: Normal range of motion. No JVD present. No tracheal deviation present. No thyromegaly present.   Cardiovascular: Normal rate, regular rhythm and normal heart sounds.    No murmur heard.  Pulmonary/Chest: Symmetric chest wall expansion. No stridor. She has no wheezes. She has no rhonchi. She has no rales. She exhibits no tenderness.   Abdominal: Soft. There is no tenderness.   Musculoskeletal: She exhibits edema (mild LE edema).   Lymphadenopathy:     She has no cervical adenopathy.   Neurological: She is alert and oriented to person, place, and time.   Skin: Skin is warm and dry. No rash noted. No erythema. Nails show no clubbing.   Psychiatric: She has a normal mood and affect.   Vitals reviewed.    Personal Diagnostic Review    No flowsheet data found.      Assessment:       1. Chronic combined systolic and diastolic heart failure    2. Gastroesophageal reflux disease with esophagitis    3. ESRD on hemodialysis    4. Obesity, Class II, BMI 35-39.9, with comorbidity    5. Sarcoid        Outpatient Encounter Prescriptions as of 1/20/2017   Medication Sig Dispense Refill    ACZONE 5 % topical gel Apply to face every morning  2    allopurinol (ZYLOPRIM) 100 MG tablet Take 1 tablet (100 mg total) by mouth  once daily. (Patient taking differently: Take 100 mg by mouth every morning. ) 30 tablet 3    ammonium lactate (LAC-HYDRIN) 12 % lotion Apply topically as needed (to legs).   2    aspirin 81 MG Chew Take 81 mg by mouth every morning.      atorvastatin (LIPITOR) 80 MG tablet Take 1 tablet (80 mg total) by mouth once daily. (Patient taking differently: Take 80 mg by mouth nightly. ) 90 tablet 1    [] cephALEXin (KEFLEX) 500 MG capsule Take 1 capsule (500 mg total) by mouth every 8 (eight) hours. 15 capsule 0    gabapentin (NEURONTIN) 300 MG capsule Take 1 capsule (300 mg total) by mouth 3 (three) times daily. 270 capsule 0    ketorolac 0.5% (ACULAR) 0.5 % Drop instill one drop into both eyes every morning  3    lactulose (CHRONULAC) 20 gram/30 mL Soln Take 15 mLs (10 g total) by mouth 3 (three) times daily as needed (for constipation). 450 mL 1    levetiracetam (KEPPRA) 500 MG Tab Take 1 tablet (500 mg total) by mouth 2 (two) times daily. 60 tablet 5    metoprolol tartrate (LOPRESSOR) 25 MG tablet Take 1 tablet (25 mg total) by mouth 2 (two) times daily. 60 tablet 11    omeprazole (PRILOSEC) 20 MG capsule Take 1 capsule (20 mg total) by mouth once daily. 30 capsule 5    prednisoLONE acetate (PRED FORTE) 1 % DrpS INSTILL ONE DROP INTO  LEFT EYE  TWICE A DAY  3    predniSONE (DELTASONE) 10 MG tablet Takes one 10 mg tablet every morning 60 tablet 3    RENVELA 800 mg Tab TAKE 1 TABLET BY MOUTH THREE TIMES DAILY WITH MEALS 90 tablet 0    tizanidine 4 mg Cap Take 1 capsule by mouth 2 (two) times daily as needed. 60 capsule 0    warfarin (COUMADIN) 5 MG tablet TAKE 1 TABLET BY MOUTH EVERYDAY EXCEPT 1 AND 1/2 TABLETS ON MONDAY AND FRIDAY OR AS DIRECTED BY CLINIC 189 tablet 0    [DISCONTINUED] tizanidine 4 mg Cap Take 4 mg by mouth Daily. 60 capsule 1    [DISCONTINUED] warfarin (COUMADIN) 5 MG tablet Take 1 tablet daily except 1 & 1/2 tablets on Monday,Wednesday and Friday or as directed by [facility] (Patient taking differently: Take 1 tablet daily except 1/2 tablets on Wed as [facility]) 105 tablet 2    [] cefazolin (ANCEF) 2 gram in dextrose 5% 50 mL IVPB (premix)       [DISCONTINUED] 0.9%  NaCl infusion       [DISCONTINUED] 0.9%  NaCl infusion       [DISCONTINUED] 0.9%  NaCl infusion       [DISCONTINUED] calcium chloride 100 mg/mL (10 %) injection       [DISCONTINUED] phenylephrine injection       [DISCONTINUED] vasopressin injection        No facility-administered encounter medications on file as of 2017.      Orders Placed This Encounter   Procedures    Spirometry without Bronchodilator     Standing Status:   Future     Standing Expiration Date:   2017    DLCO-Carbon Monoxide Diffusing Capacity     Standing Status:   Future     Standing Expiration Date:   2017     Plan:     Continue present respiratory medications (roles reviewed at today's visit).  Begin trial with Omeprazole 20 mg QAM.  Return visit 4 months with repeat Spirometry and DLCO.

## 2017-01-21 NOTE — ASSESSMENT & PLAN NOTE
Nephrology and Cardiology consulted  Nephrology will complete HD later today and for IDH will give her midodrine and cold temparature HD today.  Telemetry  Pt states she no longer takes BB

## 2017-01-21 NOTE — ED TRIAGE NOTES
Patient received with complaint of weakness, dizziness, frequent falls, low blood pressure, and intermittent abdominal pain for 30 days.  States recent placement of right chest wall pacer.  No concern on eval, with incision line closed and well approximated.  No redness, not heat.       No LDA's in place on arrival to department.    Family present.    Pain:  Rated 5/10, generalized burning pain to upper abdomen. Does report chest pain at insertion site of pacer, with no new symptom today, rather pain increases with movement and palpation.    Psychosocial:  Patient is calm and cooperative.  Patients insight and judgement are appropriate to situation.  Appears clean, well maintained, with clothing appropriate to environment.  No evidence of delusions, hallucinations, or psychosis.    Neuro:  Eyes open spontaneously.  Awake, alert, oriented x 4.  Speech clear and appropriate.  Tolerating saliva secretions well.  Able to follow commands, demonstrating ability to actively and appropriately communicate within context of current conversation.  Symmetrical facial muscles.  Moving all extremities well with no noted weakness.  Adequate muscle tone present.    Movement is purposeful.  No evidence of impaired sensation.        Airway:  Bilateral chest rise and fall.  RR regular and non-labored.  Speaks full sentences without SOB, on 2 LPM NC.  No crepitus or subcutaneous emphysema noted on palpation.      Circulatory:  Skin warm, dry, and pink.  Apical and radial pulses strong and regular.  Capillary refill/skin blanching less than 3 seconds to distal of 4 extremities.  Pacer present with ventricular pace, not at 100%.    Abdomen:  Abdomen obese, soft and non-distended.  Positive normo-active bowel sounds x 4 quadrants.      Urinary:  Dialysis does not make urine.    Extremities:  No redness, heat, swelling, deformity, or pain.    Skin:  Intact with no bruising/discolorations noted.

## 2017-01-21 NOTE — H&P
[facility] Hospital Medicine  History & Physical    Patient Name: Sisi Medel  MRN: [id number]  Admission Date: 1/21/2017  Attending Physician: Dinh Ochoa MD   Primary Care Provider: Carlos A Caputo MD    [facility] Medicine Team: [facility] HOSP MED F Emily Laguna PA-C     Patient information was obtained from patient and ER records.     Subjective:     Principal Problem:Hypotension    Chief Complaint:  hypotension     HPI: 57 y/o F with PMH ESRD on HD (anuric), HTN, pHTN on O2, sarcoidosis, paroxysmal AFIB, s/p recent (1/11/17) BiV pacemaker implanted in anticipation of AVN RFA, seizure disorder on Keppra presents to the ED with c/o weakness, fatigue and near syncopal episode.  Pt reports she was hypotensive at her last HD session on Thursday and did not complete her HD session.      In the ED, CBC unrevealing. Electrolytes wnl. Troponin elevated in context of ESRD.  EKG underlying AFIB, rate 66 bpm, intermittent BiV pacing detected.  Cardiology consulted and completed bedside echo without evidence for effusion/tamponade.  PPM interrogation shows pacemaker functioning appropriately. Patient admitted to observation for dialysis. Nephrology consulted.    Pt denies fever, chills, CP, SOB, diaphoresis.  Chest discomfort at site of pacemaker insertion.  +nonproductive cough.  +epigastric pain.  No N/V/D/constipation.         Past Medical History   Diagnosis Date    Anemia in ESRD (end-stage renal disease) 3/7/2016    Anticoagulant long-term use     Cervical radiculopathy 7/20/2015    CHF (congestive heart failure)     Chronic combined systolic and diastolic heart failure 6/14/2013     EF 20% 2013, improved with Medical therapy, negative PET 2013    Chronic respiratory failure with hypoxia 04/22/2013     On home oxygen at 2-5 liters    Chronic rhinitis 10/2/2013    DDD (degenerative disc disease), lumbar 6/17/2015    ESRD on hemodialysis 2/23/2016    Essential hypertension      Gout     Hyperlipidemia 3/7/2016    Lateral meniscal tear 2016    Lumbar stenosis 2015    Morbid obesity 8/15/2013    Paroxysmal atrial fibrillation 2014     Not on anticoagulation    Peripheral neuropathy 2013    Personal history of fall 2014    Personal history of gastric ulcer 3/19/2013    Sarcoidosis, lung 2009     Diagnosed in  with ocular manifestation, on 4L home O2 and PO steroids    Secondary pulmonary hypertension 2015    Seizure disorder 3/19/2013    Seizures     Shingles 2013    Spondylarthrosis 2015    Thyroid disease        Past Surgical History   Procedure Laterality Date    Abdominal surgery      Vascular surgery       section       x2    Other surgical history       loop recorder implant    Loop recorder         Review of patient's allergies indicates:   Allergen Reactions    Bactrim [sulfamethoxazole-trimethoprim] Other (See Comments)     Causes renal failure    Nsaids (non-steroidal anti-inflammatory drug) Other (See Comments)     Renal failure       No current facility-administered medications on file prior to encounter.      Current Outpatient Prescriptions on File Prior to Encounter   Medication Sig    ACZONE 5 % topical gel Apply to face every morning    allopurinol (ZYLOPRIM) 100 MG tablet Take 1 tablet (100 mg total) by mouth once daily. (Patient taking differently: Take 100 mg by mouth every morning. )    ammonium lactate (LAC-HYDRIN) 12 % lotion Apply topically as needed (to legs).     aspirin 81 MG Chew Take 81 mg by mouth every morning.    atorvastatin (LIPITOR) 80 MG tablet Take 1 tablet (80 mg total) by mouth once daily. (Patient taking differently: Take 80 mg by mouth nightly. )    gabapentin (NEURONTIN) 300 MG capsule Take 1 capsule (300 mg total) by mouth 3 (three) times daily.    ketorolac 0.5% (ACULAR) 0.5 % Drop instill one drop into both eyes every morning    lactulose (CHRONULAC) 20 gram/30 mL  Soln Take 15 mLs (10 g total) by mouth 3 (three) times daily as needed (for constipation).    levetiracetam (KEPPRA) 500 MG Tab Take 1 tablet (500 mg total) by mouth 2 (two) times daily.    metoprolol tartrate (LOPRESSOR) 25 MG tablet Take 1 tablet (25 mg total) by mouth 2 (two) times daily.    omeprazole (PRILOSEC) 20 MG capsule Take 1 capsule (20 mg total) by mouth once daily.    prednisoLONE acetate (PRED FORTE) 1 % DrpS INSTILL ONE DROP INTO  LEFT EYE  TWICE A DAY    predniSONE (DELTASONE) 10 MG tablet Takes one 10 mg tablet every morning    RENVELA 800 mg Tab TAKE 1 TABLET BY MOUTH THREE TIMES DAILY WITH MEALS    tizanidine 4 mg Cap Take 1 capsule by mouth 2 (two) times daily as needed.    warfarin (COUMADIN) 5 MG tablet TAKE 1 TABLET BY MOUTH EVERYDAY EXCEPT 1 AND 1/2 TABLETS ON MONDAY AND FRIDAY OR AS DIRECTED BY CLINIC     Family History     Problem Relation (Age of Onset)    Coronary artery disease Father    Diabetes Mother, Father, Maternal Grandmother    Hypertension Mother, Father    Kidney failure Mother    Lupus Sister        Social History Main Topics    Smoking status: Former Smoker     Packs/day: 0.50     Years: 10.00     Types: Cigarettes     Quit date: 4/22/1994    Smokeless tobacco: Never Used    Alcohol use No    Drug use: No    Sexual activity: No     Review of Systems   Constitutional: Positive for fatigue. Negative for chills, diaphoresis and fever.   HENT: Negative for congestion, sinus pressure, trouble swallowing and voice change.    Eyes: Negative for photophobia, pain, redness and visual disturbance.   Respiratory: Positive for cough. Negative for chest tightness, shortness of breath and wheezing.    Cardiovascular: Positive for chest pain and leg swelling. Negative for palpitations.   Gastrointestinal: Positive for abdominal pain. Negative for abdominal distention, constipation, diarrhea, nausea and vomiting.   Endocrine: Negative for cold intolerance, heat intolerance,  polydipsia and polyuria.   Musculoskeletal: Negative for arthralgias, back pain, myalgias and neck pain.   Skin: Negative for color change, pallor, rash and wound.   Neurological: Positive for dizziness, seizures, weakness and light-headedness. Negative for tremors, syncope and headaches.   Hematological: Negative for adenopathy. Does not bruise/bleed easily.   Psychiatric/Behavioral: Negative for confusion, decreased concentration and dysphoric mood. The patient is not nervous/anxious.      Objective:     Vital Signs (Most Recent):  Temp: 97.7 °F (36.5 °C) (01/21/17 0743)  Pulse: 60 (01/21/17 1109)  Resp: 14 (01/21/17 1109)  BP: 90/65 (01/21/17 1109)  SpO2: 100 % (01/21/17 1109) Vital Signs (24h Range):  Temp:  [97.7 °F (36.5 °C)] 97.7 °F (36.5 °C)  Pulse:  [60-70] 60  Resp:  [14-18] 14  SpO2:  [98 %-100 %] 100 %  BP: ()/(55-72) 90/65     Weight: 119.3 kg (263 lb)  Body mass index is 39.99 kg/(m^2).    Physical Exam   Constitutional: She is oriented to person, place, and time. She appears well-developed and well-nourished.   HENT:   Head: Normocephalic and atraumatic.   Eyes: EOM are normal. Pupils are equal, round, and reactive to light.   Neck: Normal range of motion. Neck supple. No tracheal deviation present. No thyromegaly present.   Cardiovascular: Normal rate.  An irregularly irregular rhythm present.   No murmur heard.  Pulmonary/Chest: Effort normal and breath sounds normal. She has no wheezes.   Abdominal: Soft. Bowel sounds are normal. There is no tenderness.   Musculoskeletal: Normal range of motion. She exhibits edema (trace b/l). She exhibits no tenderness.   Lymphadenopathy:     She has no cervical adenopathy.   Neurological: She is alert and oriented to person, place, and time. She has normal reflexes. No cranial nerve deficit.   Skin: Skin is warm and dry.   Psychiatric: She has a normal mood and affect. Her behavior is normal.   Nursing note and vitals reviewed.       Significant Labs: All  pertinent labs within the past 24 hours have been reviewed.    Significant Imaging: I have reviewed all pertinent imaging results/findings within the past 24 hours.    Assessment/Plan:     * Hypotension  Nephrology and Cardiology consulted  Nephrology will complete HD later today and for IDH will give her midodrine and cold temparature HD today.  Telemetry  Pt states she no longer takes BB      ESRD on hemodialysis  As above  Renal diet  Renal function panel daily      Anemia in ESRD (end-stage renal disease)  Epogen to be given during dialysis      Paroxysmal atrial fibrillation  Cardiology assistance appreciated:  - no pericardial effusion on limited bedside echo, pacemaker is functioning appropriately  - trop elevation is chronic and in context of ESRD, denies any chest pain or other anginal equivalent- no need to continue to trend unless clinically changes  - patient has h/o hypotension veto-HD, recommend optimization of fluid status per medicine/nephrology  - INR therapeutic at 2.1.  Continue coumadin 5 mg daily except 7.5 on Monday and Friday.  Daily INR.         Chronic combined systolic and diastolic heart failure  Last echo 06/2016.  Will order repeat.      Secondary pulmonary hypertension  Repeat 2D echo with color flow doppler.      Essential hypertension  As above      Seizure disorder  Will continue Keppa 500 mg bid      Sarcoid  Continue prednisone      Hyperlipidemia  Continue statin      VTE Risk Mitigation         Ordered     Medium Risk of VTE  Once      01/21/17 1308        Emily Laguna PA-C  Department of Hospital Medicine   [facility]

## 2017-01-21 NOTE — CONSULTS
REASON FOR CONSULT: ESRD management    HISTORY OF PRESENT ILLNESS: 56 y.o. female with a history of  has a past medical history of Anemia in ESRD (end-stage renal disease) (3/7/2016); Anticoagulant long-term use; Cervical radiculopathy (7/20/2015); CHF (congestive heart failure); Chronic combined systolic and diastolic heart failure (6/14/2013); Chronic respiratory failure with hypoxia (04/22/2013); Chronic rhinitis (10/2/2013); DDD (degenerative disc disease), lumbar (6/17/2015); ESRD on hemodialysis (2/23/2016); Essential hypertension; Gout; Hyperlipidemia (3/7/2016); Lateral meniscal tear (5/31/2016); Lumbar stenosis (6/17/2015); Morbid obesity (8/15/2013); Paroxysmal atrial fibrillation (5/16/2014); Peripheral neuropathy (11/13/2013); Personal history of fall (7/14/2014); Personal history of gastric ulcer (3/19/2013); Sarcoidosis, lung (2009); Secondary pulmonary hypertension (4/7/2015); Seizure disorder (3/19/2013); Seizures; Shingles (11/13/2013); Spondylarthrosis (6/17/2015); and Thyroid disease. presents for had concerns including Fatigue. on 1/21/2017. She is on maintenance IHD  TTS is here for management of hypotension . She denies any fever or chills.  ROS:  Review of Systems - General ROS: negative for - chills, fatigue, fever, night sweats, weight gain or weight loss  ENT ROS: positive for - epistaxis, headaches, hearing change, nasal congestion, nasal discharge, nasal polyps, oral lesions, sinus pain, sneezing, sore throat, tinnitus and vertigo  Hematological and Lymphatic ROS: negative for - bleeding problems, blood clots, bruising, fatigue, jaundice, night sweats or swollen lymph nodes  Endocrine ROS: negative for - breast changes, galactorrhea, hair pattern changes, hot flashes, mood swings, palpitations, polydipsia/polyuria, skin changes or temperature intolerance  Respiratory ROS: no cough, shortness of breath, or wheezing  negative for - hemoptysis, pleuritic pain, sputum changes or  stridor  Cardiovascular ROS: no chest pain or dyspnea on exertion  negative for - edema, irregular heartbeat, loss of consciousness, orthopnea, palpitations, paroxysmal nocturnal dyspnea or shortness of breath  Gastrointestinal ROS: no abdominal pain, change in bowel habits, or black or bloody stools  negative for - abdominal pain, appetite loss, change in bowel habits, change in stools, gas/bloating, heartburn, nausea/vomiting, stool incontinence or swallowing difficulty/pain  Genito-Urinary ROS: no dysuria, trouble voiding, or hematuria  Musculoskeletal ROS: negative for - joint pain, joint stiffness, joint swelling, muscle pain or muscular weakness  Neurological ROS: no TIA or stroke symptoms  negative for - confusion, headaches, impaired coordination/balance, memory loss or visual changes  Dermatological ROS: negative for acne, dry skin, eczema, hair changes, lumps, mole changes and nail changes    PAST MEDICAL HISTORY:  Past Medical History   Diagnosis Date    Anemia in ESRD (end-stage renal disease) 3/7/2016    Anticoagulant long-term use     Cervical radiculopathy 7/20/2015    CHF (congestive heart failure)     Chronic combined systolic and diastolic heart failure 6/14/2013     EF 20% 2013, improved with Medical therapy, negative PET 2013    Chronic respiratory failure with hypoxia 04/22/2013     On home oxygen at 2-5 liters    Chronic rhinitis 10/2/2013    DDD (degenerative disc disease), lumbar 6/17/2015    ESRD on hemodialysis 2/23/2016    Essential hypertension     Gout     Hyperlipidemia 3/7/2016    Lateral meniscal tear 5/31/2016    Lumbar stenosis 6/17/2015    Morbid obesity 8/15/2013    Paroxysmal atrial fibrillation 5/16/2014     Not on anticoagulation    Peripheral neuropathy 11/13/2013    Personal history of fall 7/14/2014    Personal history of gastric ulcer 3/19/2013    Sarcoidosis, lung 2009     Diagnosed in 2009 with ocular manifestation, on 4L home O2 and PO steroids     Secondary pulmonary hypertension 2015    Seizure disorder 3/19/2013    Seizures     Shingles 2013    Spondylarthrosis 2015    Thyroid disease        PAST SURGICAL HISTORY:  Past Surgical History   Procedure Laterality Date    Abdominal surgery      Vascular surgery       section       x2    Other surgical history       loop recorder implant    Loop recorder         FAMILY HISTORY:   Family History   Problem Relation Age of Onset    Kidney failure Mother     Hypertension Mother     Diabetes Mother     Coronary artery disease Father     Hypertension Father     Diabetes Father     Lupus Sister     Diabetes Maternal Grandmother     Colon cancer Neg Hx     Ovarian cancer Neg Hx     Breast cancer Neg Hx     Melanoma Neg Hx        SOCIAL HISTORY:  Social History     Social History    Marital status: Single     Spouse name: N/A    Number of children: N/A    Years of education: N/A     Occupational History    Not on file.     Social History Main Topics    Smoking status: Former Smoker     Packs/day: 0.50     Years: 10.00     Types: Cigarettes     Quit date: 1994    Smokeless tobacco: Never Used    Alcohol use No    Drug use: No    Sexual activity: No     Other Topics Concern    Not on file     Social History Narrative    Lives with boyfriend/partner who helps with her care.     Plans to travel to [place] for 2 weeks in 2016       ALLERGIES:  Review of patient's allergies indicates:   Allergen Reactions    Bactrim [sulfamethoxazole-trimethoprim] Other (See Comments)     Causes renal failure    Nsaids (non-steroidal anti-inflammatory drug) Other (See Comments)     Renal failure       MEDICATIONS:Scheduled Meds:   sodium chloride 0.9%   Intravenous Once    midodrine  10 mg Oral Once in dialysis     Continuous Infusions:   PRN Meds:.sodium chloride 0.9%    PHYSICAL EXAM:  Visit Vitals    BP 91/60 (BP Location: Right arm, Patient Position: Sitting, BP Method:  "Automatic)    Pulse 71    Temp 98.2 °F (36.8 °C) (Oral)    Resp 16    Ht 5' 8" (1.727 m)    Wt 119.3 kg (263 lb)    LMP  (LMP Unknown)    SpO2 100%    Breastfeeding No    BMI 39.99 kg/m2     General appearance: alert, appears stated age and cooperative  Head: Normocephalic, without obvious abnormality, atraumatic  Eyes: conjunctivae/corneas clear. PERRL, EOM's intact. Fundi benign.  Nose: Nares normal. Septum midline. Mucosa normal. No drainage or sinus tenderness.  Throat: lips, mucosa, and tongue normal; teeth and gums normal  Neck: no adenopathy, no carotid bruit, no JVD, supple, symmetrical, trachea midline and thyroid not enlarged, symmetric, no tenderness/mass/nodules  Lungs: clear to auscultation bilaterally  Heart: regular rate and rhythm, S1, S2 normal, no murmur, click, rub or gallop  Abdomen: soft, non-tender; bowel sounds normal; no masses,  no organomegaly  Extremities: extremities normal, atraumatic, no cyanosis or edema  Pulses: 2+ and symmetric  Skin: Skin color, texture, turgor normal. No rashes or lesions  Neurologic: Grossly normal  Left UE AVG ; Good thrill.    INPUT/OUTPUT     -----------------------------------         LABS:  Recent Results (from the past 24 hour(s))   APTT    Collection Time: 01/21/17  8:01 AM   Result Value Ref Range    aPTT 30.8 21.0 - 32.0 sec   CBC auto differential    Collection Time: 01/21/17  8:01 AM   Result Value Ref Range    WBC 9.71 3.90 - 12.70 K/uL    RBC 3.11 (L) 4.00 - 5.40 M/uL    Hemoglobin 9.7 (L) 12.0 - 16.0 g/dL    Hematocrit 30.7 (L) 37.0 - 48.5 %    MCV 99 (H) 82 - 98 fL    MCH 31.2 (H) 27.0 - 31.0 pg    MCHC 31.6 (L) 32.0 - 36.0 %    RDW 15.8 (H) 11.5 - 14.5 %    Platelets 267 150 - 350 K/uL    MPV 10.8 9.2 - 12.9 fL    Gran # 6.6 1.8 - 7.7 K/uL    Lymph # 1.7 1.0 - 4.8 K/uL    Mono # 1.3 (H) 0.3 - 1.0 K/uL    Eos # 0.1 0.0 - 0.5 K/uL    Baso # 0.01 0.00 - 0.20 K/uL    Gran% 68.5 38.0 - 73.0 %    Lymph% 17.0 (L) 18.0 - 48.0 %    Mono% 13.5 4.0 - " 15.0 %    Eosinophil% 0.9 0.0 - 8.0 %    Basophil% 0.1 0.0 - 1.9 %    Differential Method Automated    Comprehensive metabolic panel    Collection Time: 01/21/17  8:01 AM   Result Value Ref Range    Sodium 135 (L) 136 - 145 mmol/L    Potassium 4.4 3.5 - 5.1 mmol/L    Chloride 96 95 - 110 mmol/L    CO2 27 23 - 29 mmol/L    Glucose 160 (H) 70 - 110 mg/dL    BUN, Bld 52 (H) 6 - 20 mg/dL    Creatinine 7.6 (H) 0.5 - 1.4 mg/dL    Calcium 9.2 8.7 - 10.5 mg/dL    Total Protein 6.4 6.0 - 8.4 g/dL    Albumin 2.9 (L) 3.5 - 5.2 g/dL    Total Bilirubin 0.5 0.1 - 1.0 mg/dL    Alkaline Phosphatase 90 55 - 135 U/L    AST 16 10 - 40 U/L    ALT <5 (L) 10 - 44 U/L    Anion Gap 12 8 - 16 mmol/L    eGFR if African American 6.3 (A) >60 mL/min/1.73 m^2    eGFR if non African American 5.5 (A) >60 mL/min/1.73 m^2   Lactic acid, plasma    Collection Time: 01/21/17  8:01 AM   Result Value Ref Range    Lactate (Lactic Acid) 1.8 0.5 - 2.2 mmol/L   Magnesium    Collection Time: 01/21/17  8:01 AM   Result Value Ref Range    Magnesium 2.2 1.6 - 2.6 mg/dL   Phosphorus    Collection Time: 01/21/17  8:01 AM   Result Value Ref Range    Phosphorus 3.2 2.7 - 4.5 mg/dL   Protime-INR    Collection Time: 01/21/17  8:01 AM   Result Value Ref Range    Prothrombin Time 21.5 (H) 9.0 - 12.5 sec    INR 2.1 (H) 0.8 - 1.2   Troponin I    Collection Time: 01/21/17  8:01 AM   Result Value Ref Range    Troponin I 0.135 (H) 0.000 - 0.026 ng/mL   Type & Screen    Collection Time: 01/21/17  8:01 AM   Result Value Ref Range    Group & Rh O POS     Indirect Maya NEG    TSH    Collection Time: 01/21/17  8:01 AM   Result Value Ref Range    TSH 0.264 (L) 0.400 - 4.000 uIU/mL   T4, free    Collection Time: 01/21/17  8:01 AM   Result Value Ref Range    Free T4 0.85 0.71 - 1.51 ng/dL   ISTAT PROCEDURE    Collection Time: 01/21/17  8:01 AM   Result Value Ref Range    POC PH 7.403 7.35 - 7.45    POC PCO2 45.9 (H) 35 - 45 mmHg    POC PO2 58 40 - 60 mmHg    POC HCO3 28.6 (H) 24 -  28 mmol/L    POC BE 4 -2 to 2 mmol/L    POC SATURATED O2 90 (L) 95 - 100 %    POC Sodium 134 (L) 136 - 145 mmol/L    POC Potassium 4.2 3.5 - 5.1 mmol/L    POC TCO2 30 (H) 24 - 29 mmol/L    POC Ionized Calcium 1.21 1.06 - 1.42 mmol/L    POC Hematocrit 31 (L) 36 - 54 %PCV    Sample VENOUS     Site Other     Allens Test N/A     DelSys Nasal Can          ASSESSMENT:  Patient Active Problem List   Diagnosis    Seizure disorder    Other chronic pulmonary heart diseases    Obesity, Class II, BMI 35-39.9, with comorbidity    Chronic rhinitis    Peripheral neuropathy    Sarcoid    Paroxysmal atrial fibrillation    Secondary pulmonary hypertension    DDD (degenerative disc disease), lumbar    Lumbar stenosis    Spondylarthrosis    Essential hypertension    Cervical radiculopathy    ESRD on hemodialysis    Gout    Hyperlipidemia    Anemia in ESRD (end-stage renal disease)    Lateral meniscal tear    Pes anserine bursitis    Complications due to renal dialysis device, implant, and graft    Chronic combined systolic and diastolic heart failure    Chronic gout due to renal impairment without tophus    Closed fracture of proximal end of right fibula    Long-term (current) use of anticoagulants    Muscle spasm    Constipation    Subclinical hyperthyroidism    Other complications due to renal dialysis device, implant, and graft    Arteriovenous fistula stenosis    SOB (shortness of breath)    Current chronic use of systemic steroids    Immunosuppressed status    Hypoalbuminemia    Pneumonia due to Streptococcus pneumoniae    Atrial fibrillation, persistent    ESRD (end stage renal disease)    GERD (gastroesophageal reflux disease)    Hypotension       PLAN:  1.  ESRD; We will do HD later today and for IDH we will give her midodrine and cold temparature HD today.  2. Anemia ; We will give her Epogen today and obtain her care plan.

## 2017-01-21 NOTE — SUBJECTIVE & OBJECTIVE
Past Medical History   Diagnosis Date    Anemia in ESRD (end-stage renal disease) 3/7/2016    Anticoagulant long-term use     Cervical radiculopathy 2015    CHF (congestive heart failure)     Chronic combined systolic and diastolic heart failure 2013     EF 20% , improved with Medical therapy, negative PET     Chronic respiratory failure with hypoxia 2013     On home oxygen at 2-5 liters    Chronic rhinitis 10/2/2013    DDD (degenerative disc disease), lumbar 2015    ESRD on hemodialysis 2016    Essential hypertension     Gout     Hyperlipidemia 3/7/2016    Lateral meniscal tear 2016    Lumbar stenosis 2015    Morbid obesity 8/15/2013    Paroxysmal atrial fibrillation 2014     Not on anticoagulation    Peripheral neuropathy 2013    Personal history of fall 2014    Personal history of gastric ulcer 3/19/2013    Sarcoidosis, lung 2009     Diagnosed in  with ocular manifestation, on 4L home O2 and PO steroids    Secondary pulmonary hypertension 2015    Seizure disorder 3/19/2013    Seizures     Shingles 2013    Spondylarthrosis 2015    Thyroid disease        Past Surgical History   Procedure Laterality Date    Abdominal surgery      Vascular surgery       section       x2    Other surgical history       loop recorder implant    Loop recorder         Review of patient's allergies indicates:   Allergen Reactions    Bactrim [sulfamethoxazole-trimethoprim] Other (See Comments)     Causes renal failure    Nsaids (non-steroidal anti-inflammatory drug) Other (See Comments)     Renal failure       No current facility-administered medications on file prior to encounter.      Current Outpatient Prescriptions on File Prior to Encounter   Medication Sig    ACZONE 5 % topical gel Apply to face every morning    allopurinol (ZYLOPRIM) 100 MG tablet Take 1 tablet (100 mg total) by mouth once daily. (Patient taking  differently: Take 100 mg by mouth every morning. )    ammonium lactate (LAC-HYDRIN) 12 % lotion Apply topically as needed (to legs).     aspirin 81 MG Chew Take 81 mg by mouth every morning.    atorvastatin (LIPITOR) 80 MG tablet Take 1 tablet (80 mg total) by mouth once daily. (Patient taking differently: Take 80 mg by mouth nightly. )    gabapentin (NEURONTIN) 300 MG capsule Take 1 capsule (300 mg total) by mouth 3 (three) times daily.    ketorolac 0.5% (ACULAR) 0.5 % Drop instill one drop into both eyes every morning    lactulose (CHRONULAC) 20 gram/30 mL Soln Take 15 mLs (10 g total) by mouth 3 (three) times daily as needed (for constipation).    levetiracetam (KEPPRA) 500 MG Tab Take 1 tablet (500 mg total) by mouth 2 (two) times daily.    metoprolol tartrate (LOPRESSOR) 25 MG tablet Take 1 tablet (25 mg total) by mouth 2 (two) times daily.    omeprazole (PRILOSEC) 20 MG capsule Take 1 capsule (20 mg total) by mouth once daily.    prednisoLONE acetate (PRED FORTE) 1 % DrpS INSTILL ONE DROP INTO  LEFT EYE  TWICE A DAY    predniSONE (DELTASONE) 10 MG tablet Takes one 10 mg tablet every morning    RENVELA 800 mg Tab TAKE 1 TABLET BY MOUTH THREE TIMES DAILY WITH MEALS    tizanidine 4 mg Cap Take 1 capsule by mouth 2 (two) times daily as needed.    warfarin (COUMADIN) 5 MG tablet TAKE 1 TABLET BY MOUTH EVERYDAY EXCEPT 1 AND 1/2 TABLETS ON MONDAY AND FRIDAY OR AS DIRECTED BY CLINIC     Family History     Problem Relation (Age of Onset)    Coronary artery disease Father    Diabetes Mother, Father, Maternal Grandmother    Hypertension Mother, Father    Kidney failure Mother    Lupus Sister        Social History Main Topics    Smoking status: Former Smoker     Packs/day: 0.50     Years: 10.00     Types: Cigarettes     Quit date: 4/22/1994    Smokeless tobacco: Never Used    Alcohol use No    Drug use: No    Sexual activity: No     Review of Systems   Constitutional: Positive for fatigue. Negative for  chills, diaphoresis and fever.   HENT: Negative for congestion, sinus pressure, trouble swallowing and voice change.    Eyes: Negative for photophobia, pain, redness and visual disturbance.   Respiratory: Positive for cough. Negative for chest tightness, shortness of breath and wheezing.    Cardiovascular: Positive for chest pain and leg swelling. Negative for palpitations.   Gastrointestinal: Positive for abdominal pain. Negative for abdominal distention, constipation, diarrhea, nausea and vomiting.   Endocrine: Negative for cold intolerance, heat intolerance, polydipsia and polyuria.   Musculoskeletal: Negative for arthralgias, back pain, myalgias and neck pain.   Skin: Negative for color change, pallor, rash and wound.   Neurological: Positive for dizziness, seizures, weakness and light-headedness. Negative for tremors, syncope and headaches.   Hematological: Negative for adenopathy. Does not bruise/bleed easily.   Psychiatric/Behavioral: Negative for confusion, decreased concentration and dysphoric mood. The patient is not nervous/anxious.      Objective:     Vital Signs (Most Recent):  Temp: 97.7 °F (36.5 °C) (01/21/17 0743)  Pulse: 60 (01/21/17 1109)  Resp: 14 (01/21/17 1109)  BP: 90/65 (01/21/17 1109)  SpO2: 100 % (01/21/17 1109) Vital Signs (24h Range):  Temp:  [97.7 °F (36.5 °C)] 97.7 °F (36.5 °C)  Pulse:  [60-70] 60  Resp:  [14-18] 14  SpO2:  [98 %-100 %] 100 %  BP: ()/(55-72) 90/65     Weight: 119.3 kg (263 lb)  Body mass index is 39.99 kg/(m^2).    Physical Exam   Constitutional: She is oriented to person, place, and time. She appears well-developed and well-nourished.   HENT:   Head: Normocephalic and atraumatic.   Eyes: EOM are normal. Pupils are equal, round, and reactive to light.   Neck: Normal range of motion. Neck supple. No tracheal deviation present. No thyromegaly present.   Cardiovascular: Normal rate.  An irregularly irregular rhythm present.   No murmur heard.  Pulmonary/Chest: Effort  normal and breath sounds normal. She has no wheezes.   Abdominal: Soft. Bowel sounds are normal. There is no tenderness.   Musculoskeletal: Normal range of motion. She exhibits edema (trace b/l). She exhibits no tenderness.   Lymphadenopathy:     She has no cervical adenopathy.   Neurological: She is alert and oriented to person, place, and time. She has normal reflexes. No cranial nerve deficit.   Skin: Skin is warm and dry.   Psychiatric: She has a normal mood and affect. Her behavior is normal.   Nursing note and vitals reviewed.       Significant Labs: All pertinent labs within the past 24 hours have been reviewed.    Significant Imaging: I have reviewed all pertinent imaging results/findings within the past 24 hours.

## 2017-01-21 NOTE — ASSESSMENT & PLAN NOTE
Cardiology assistance appreciated:  - no pericardial effusion on limited bedside echo, pacemaker is functioning appropriately  - trop elevation is chronic and in context of ESRD, denies any chest pain or other anginal equivalent- no need to continue to trend unless clinically changes  - patient has h/o hypotension veto-HD, recommend optimization of fluid status per medicine/nephrology  - INR therapeutic at 2.1.  Continue coumadin 5 mg daily except 7.5 on Monday and Friday.  Daily INR.

## 2017-01-21 NOTE — PROGRESS NOTES
Report received form CATHRYN Granger RN. Pt arrived form floor AAOx4. Maintenance dialysis initiated via RAISSA graft without difficulty.

## 2017-01-21 NOTE — ED PROVIDER NOTES
"Encounter Date: 1/21/2017       History     Chief Complaint   Patient presents with    Fatigue     Patient found with complaint of abdominal pain , weakness, and hypotension at dialysis.  Blood pressure dropped from 90 systolic to 50/31, HR 70     Review of patient's allergies indicates:   Allergen Reactions    Bactrim [sulfamethoxazole-trimethoprim] Other (See Comments)     Causes renal failure    Nsaids (non-steroidal anti-inflammatory drug) Other (See Comments)     Renal failure     HPI Comments: Patient is a 57yo F with PMHx of HTN, COPD, sarcoid, CHF with recent pacemaker placement on 1/11/17, ESRD on HD with last HD Thursday. Patient was noted to be hypotensive today at her Dialysis Unit with systolic pressures in the 70s. She was brought to [facility] ED. She reports feeling general weakness, fatigue, and occasional near syncope. These symptoms are not new but have worsened today. Patient reports chest pain at site of pacemaker placement. She denies palpitations, SOB. She does report ongoing cough since diagnosis of "pneumonia" last month. Denies fevers or chills. She endorses occasional burning epigastric pain. Denies n/v/diarrhea. She does not make urine.     Patient is a 56 y.o. female presenting with the following complaint: fatigue. The history is provided by the patient.   Fatigue   Associated symptoms include chest pain (at site of pacemaker placement) and abdominal pain (burning epigastric). Pertinent negatives include no shortness of breath.     Past Medical History   Diagnosis Date    Anemia in ESRD (end-stage renal disease) 3/7/2016    Anticoagulant long-term use     Cervical radiculopathy 7/20/2015    CHF (congestive heart failure)     Chronic combined systolic and diastolic heart failure 6/14/2013     EF 20% 2013, improved with Medical therapy, negative PET 2013    Chronic respiratory failure with hypoxia 04/22/2013     On home oxygen at 2-5 liters    Chronic rhinitis 10/2/2013    DDD " (degenerative disc disease), lumbar 2015    ESRD on hemodialysis 2016    Essential hypertension     Gout     Hyperlipidemia 3/7/2016    Lateral meniscal tear 2016    Lumbar stenosis 2015    Morbid obesity 8/15/2013    Paroxysmal atrial fibrillation 2014     Not on anticoagulation    Peripheral neuropathy 2013    Personal history of fall 2014    Personal history of gastric ulcer 3/19/2013    Sarcoidosis, lung 2009     Diagnosed in  with ocular manifestation, on 4L home O2 and PO steroids    Secondary pulmonary hypertension 2015    Seizure disorder 3/19/2013    Seizures     Shingles 2013    Spondylarthrosis 2015    Thyroid disease      Past Medical History Pertinent Negatives   Diagnosis Date Noted    Asthma 2016    Cancer 2016    COPD (chronic obstructive pulmonary disease) 2016    Coronary artery disease 2016    Diabetes mellitus 2016    Difficult intubation 2017    Encounter for blood transfusion 2016    General anesthetics causing adverse effect in therapeutic use 2017    Hypotension, iatrogenic 2017    Malignant hyperthermia 2017    PONV (postoperative nausea and vomiting) 2017    Respiratory distress 2017    Stroke 2016    Transfusion reaction 2017     Past Surgical History   Procedure Laterality Date    Abdominal surgery      Vascular surgery       section       x2    Other surgical history       loop recorder implant    Loop recorder       Family History   Problem Relation Age of Onset    Kidney failure Mother     Hypertension Mother     Diabetes Mother     Coronary artery disease Father     Hypertension Father     Diabetes Father     Lupus Sister     Diabetes Maternal Grandmother     Colon cancer Neg Hx     Ovarian cancer Neg Hx     Breast cancer Neg Hx     Melanoma Neg Hx      Social History   Substance Use Topics    Smoking  status: Former Smoker     Packs/day: 0.50     Years: 10.00     Types: Cigarettes     Quit date: 4/22/1994    Smokeless tobacco: Never Used    Alcohol use No     Review of Systems   Constitutional: Positive for fatigue. Negative for fever.   HENT: Negative for sore throat and trouble swallowing.    Respiratory: Positive for cough. Negative for shortness of breath.    Cardiovascular: Positive for chest pain (at site of pacemaker placement). Negative for palpitations.   Gastrointestinal: Positive for abdominal pain (burning epigastric). Negative for diarrhea, nausea and vomiting.   Genitourinary: Negative for dysuria.        +anuric   Musculoskeletal: Negative for myalgias and neck stiffness.   Skin: Negative for rash and wound.   Neurological: Positive for dizziness, weakness and light-headedness. Negative for seizures.       Physical Exam   Initial Vitals   BP Pulse Resp Temp SpO2   01/21/17 0743 01/21/17 0731 01/21/17 0731 01/21/17 0743 --   99/58 70 18 97.7 °F (36.5 °C)      Physical Exam    Constitutional: She appears well-developed and well-nourished. No distress.   HENT:   Head: Normocephalic and atraumatic.   Right Ear: External ear normal.   Left Ear: External ear normal.   Eyes: EOM are normal. Pupils are equal, round, and reactive to light. Right eye exhibits no discharge. Left eye exhibits no discharge.   Neck: Normal range of motion. Neck supple. No tracheal deviation present.   Cardiovascular: Intact distal pulses. Exam reveals no friction rub.    +irregularly irregular   +bradycardic   Pulmonary/Chest: No respiratory distress. She has no wheezes.   +minimal bibasilar crackles   Abdominal: Soft. She exhibits no distension. There is tenderness (mild epigastric). There is no rebound and no guarding.   Musculoskeletal: Normal range of motion. She exhibits edema (trace bilateral).   Neurological: She is alert and oriented to person, place, and time. No cranial nerve deficit.   Skin: Skin is warm and dry. No  erythema. No pallor.   Psychiatric: She has a normal mood and affect. Thought content normal.         ED Course   Procedures  Labs Reviewed   CULTURE, BLOOD   CULTURE, BLOOD   CULTURE, URINE   APTT   CBC W/ AUTO DIFFERENTIAL   COMPREHENSIVE METABOLIC PANEL   LACTIC ACID, PLASMA   MAGNESIUM   PHOSPHORUS   PROTIME-INR   TROPONIN I   URINALYSIS   LACTIC ACID, PLASMA   TSH   TYPE & SCREEN     EKG Readings: (Independently Interpreted)   Initial Reading: No STEMI. Heart Rate: 66.       X-Rays:   Independently Interpreted Readings:   Other Readings:  No obvious focal areas of consolidation.    Medical Decision Making:   Initial Assessment:   Patient with HTN, ESRD on HD, CHF with recent pacemaker placement who presents with hypotension.  Differential Diagnosis:   Sepsis vs cardiogenic/arrythmia vs metabolic/electrolyte disturbance vs medication induced       APC / Resident Notes:   8:00 AM  Patient seen and examined. SBP in the 90s in ED. Will initiate evaluation with blood work, EKG, CXR.    9:00 AM  CBC unrevealing. Electrolytes wnl. Will ask cardiology to come see patient. Will ask cardiology for bedside echo to evaluate for effusion. If Cardiology does not want to admit, will likely admit to medicine.     10:38 AM  Cardiology did bedside echo without evidence for effusion/tamponade. Patient will need observation and dialysis. Discussed with Hospital Medicine and will be admitted for obs.              ED Course     Clinical Impression:   The encounter diagnosis was Other complications due to renal dialysis device, implant, and graft.    Disposition:   Disposition: Placed in Observation  Condition: Stable       Aaron Reagan MD  Resident  01/21/17 5902

## 2017-01-21 NOTE — IP AVS SNAPSHOT
[facility]  [address]  [facility] [address]  Phone: [phone]           Patient Discharge Instructions     Our goal is to set you up for success. This packet includes information on your condition, medications, and your home care. It will help you to care for yourself so you don't get sicker and need to go back to the hospital.     Please ask your nurse if you have any questions.        There are many details to remember when preparing to leave the hospital. Here is what you will need to do:    1. Take your medicine. If you are prescribed medications, review your Medication List in the following pages. You may have new medications to  at the pharmacy and others that you'll need to stop taking. Review the instructions for how and when to take your medications. Talk with your doctor or nurses if you are unsure of what to do.     2. Go to your follow-up appointments. Specific follow-up information is listed in the following pages. Your may be contacted by a transition nurse or clinical provider about future appointments. Be sure we have all of the phone numbers to reach you, if needed. Please contact your provider's office if you are unable to make an appointment.     3. Watch for warning signs. Your doctor or nurse will give you detailed warning signs to watch for and when to call for assistance. These instructions may also include educational information about your condition. If you experience any of warning signs to your health, call your doctor.               [facility]  Unless otherwise directed by your provider, please contact [facility], our nurse care line that is available for 24/7 assistance.     [phone] (toll-free)    Registered nurses in the [facility] provide clinical advisement, health education, appointment booking, and other advisory services.                    ** Verify the list of medication(s) below is accurate and up  to date. Carry this with you in case of emergency. If your medications have changed, please notify your healthcare provider.             Medication List      CHANGE how you take these medications        Additional Info                      atorvastatin 80 MG tablet   Commonly known as:  LIPITOR   Quantity:  90 tablet   Refills:  1   Dose:  80 mg   What changed:  when to take this    Last time this was given:  80 mg on 1/21/2017 10:07 PM   Instructions:  Take 1 tablet (80 mg total) by mouth once daily.     Begin Date    AM    Noon    PM    Bedtime         CONTINUE taking these medications        Additional Info                      ACZONE 5 % topical gel   Refills:  2   Generic drug:  dapsone    Instructions:  Apply to face every morning     Begin Date    AM    Noon    PM    Bedtime       ammonium lactate 12 % lotion   Commonly known as:  LAC-HYDRIN   Refills:  2    Instructions:  Apply topically as needed (to legs).     Begin Date    AM    Noon    PM    Bedtime       aspirin 81 MG Chew   Refills:  0   Dose:  81 mg    Last time this was given:  81 mg on 1/22/2017  6:29 AM   Instructions:  Take 81 mg by mouth every morning.     Begin Date    AM    Noon    PM    Bedtime       gabapentin 300 MG capsule   Commonly known as:  NEURONTIN   Quantity:  270 capsule   Refills:  0   Dose:  300 mg   Comments:  **Patient requests 90 days supply**    Last time this was given:  300 mg on 1/22/2017  6:28 AM   Instructions:  Take 1 capsule (300 mg total) by mouth 3 (three) times daily.     Begin Date    AM    Noon    PM    Bedtime       ketorolac 0.5% 0.5 % Drop   Commonly known as:  ACULAR   Refills:  3    Last time this was given:  1 drop on 1/22/2017  8:38 AM   Instructions:  instill one drop into both eyes every morning     Begin Date    AM    Noon    PM    Bedtime       lactulose 20 gram/30 mL Soln   Commonly known as:  CHRONULAC   Quantity:  450 mL   Refills:  1   Dose:  10 g    Instructions:  Take 15 mLs (10 g total) by mouth 3  (three) times daily as needed (for constipation).     Begin Date    AM    Noon    PM    Bedtime       levetiracetam 500 MG Tab   Commonly known as:  KEPPRA   Quantity:  60 tablet   Refills:  5   Dose:  500 mg    Last time this was given:  500 mg on 1/22/2017  8:38 AM   Instructions:  Take 1 tablet (500 mg total) by mouth 2 (two) times daily.     Begin Date    AM    Noon    PM    Bedtime       metoprolol tartrate 25 MG tablet   Commonly known as:  LOPRESSOR   Quantity:  60 tablet   Refills:  11   Dose:  25 mg    Instructions:  Take 1 tablet (25 mg total) by mouth 2 (two) times daily.     Begin Date    AM    Noon    PM    Bedtime       omeprazole 20 MG capsule   Commonly known as:  PRILOSEC   Quantity:  30 capsule   Refills:  5   Dose:  20 mg    Instructions:  Take 1 capsule (20 mg total) by mouth once daily.     Begin Date    AM    Noon    PM    Bedtime       prednisoLONE acetate 1 % Drps   Commonly known as:  PRED FORTE   Refills:  3    Last time this was given:  1 drop on 1/22/2017  8:38 AM   Instructions:  INSTILL ONE DROP INTO  LEFT EYE  TWICE A DAY     Begin Date    AM    Noon    PM    Bedtime       predniSONE 10 MG tablet   Commonly known as:  DELTASONE   Quantity:  60 tablet   Refills:  3    Last time this was given:  10 mg on 1/22/2017  8:37 AM   Instructions:  Takes one 10 mg tablet every morning     Begin Date    AM    Noon    PM    Bedtime       RENVELA 800 mg Tab   Quantity:  90 tablet   Refills:  0   Generic drug:  sevelamer carbonate    Last time this was given:  800 mg on 1/22/2017 12:23 PM   Instructions:  TAKE 1 TABLET BY MOUTH THREE TIMES DAILY WITH MEALS     Begin Date    AM    Noon    PM    Bedtime       tizanidine 4 mg Cap   Quantity:  60 capsule   Refills:  0   Dose:  1 capsule    Instructions:  Take 1 capsule by mouth 2 (two) times daily as needed.     Begin Date    AM    Noon    PM    Bedtime       warfarin 5 MG tablet   Commonly known as:  COUMADIN   Quantity:  189 tablet   Refills:  0    Comments:  **Patient requests 90 days supply**    Last time this was given:  5 mg on 1/21/2017  6:40 PM   Instructions:  TAKE 1 TABLET BY MOUTH EVERYDAY EXCEPT 1 AND 1/2 TABLETS ON MONDAY AND FRIDAY OR AS DIRECTED BY CLINIC     Begin Date    AM    Noon    PM    Bedtime         STOP taking these medications     allopurinol 100 MG tablet   Commonly known as:  ZYLOPRIM                  Please bring to all follow up appointments:    1. A copy of your discharge instructions.  2. All medicines you are currently taking in their original bottles.  3. Identification and insurance card.    Please arrive 15 minutes ahead of scheduled appointment time.    Please call 24 hours in advance if you must reschedule your appointment and/or time.        Your Scheduled Appointments     Jan 27, 2017  7:00 AM CST   Non-Fasting Lab with LAB, APPOINTMENT [facility] ([facility])    [address]  [place] [address]   [phone]            Jan 27, 2017  8:00 AM CST   Established Patient Visit with MD [facility] ([facility])    [address]  [place] [address]   [phone]            Jan 27, 2017  8:00 AM CST   Remote Interrogation with HOME MONITOR DEVICE CHECK, [facility]   [facility] Arrhythmia ([facility] )    [address]  [place] [address]   [phone]            Apr 28, 2017 12:30 PM CDT   EKG with EKG, APPT   [facility] ([facility] )    [address]  [place] [address]   [phone]            Apr 28, 2017  1:00 PM CDT   Pacemaker Tune Up with PACEMAKER, ICD   [facility] ([facility] )    [address]  [place] [address]   [phone]              Follow-up Information     Follow up with Carlos A Caputo MD In 1 week.    Specialty:  Internal Medicine    Contact information:    [address] [place] [address]  [id number]  [phone]          Discharge Instructions     Future Orders    Activity as tolerated     Diet general     Questions:    Total calories:      Fat restriction, if any:      Protein restriction, if any:      Na restriction, if any:      Fluid restriction:      Additional restrictions:  Renal        Primary Diagnosis     Your primary diagnosis was:  Low Blood Pressure      Admission Information     Date & Time Provider Department CSN    1/21/2017  7:41 AM Dinh Ochoa MD [facility] [id number]      Care Providers     Provider Role Specialty Primary office phone    Dinh Ochoa MD Attending Provider Hospitalist [phone]    Dinh Ochoa MD Team Attending  Hospitalist [phone]    Hamida Desai MD Consulting Physician  Nephrology [phone]      Your Vitals Were     BP Pulse Temp Resp Height Weight    113/78 (BP Location: Right arm, Patient Position: Lying, BP Method: Automatic) 77 97.7 °F (36.5 °C) (Oral) 18 5' 8" (1.727 m) 119.3 kg (263 lb)    Last Period SpO2 BMI          (LMP Unknown) 98% 39.99 kg/m2        Recent Lab Values        4/10/2012 8/22/2013 6/15/2015 10/15/2015 5/17/2016               3:00 PM 10:56 AM 11:06 AM  9:41 AM  9:46 AM       A1C 6.5 (H) 6.0 5.5 6.7 (H) 5.7               Pending Labs     Order Current Status    Blood culture x two cultures. Draw prior to antibiotics. Preliminary result    Blood culture x two cultures. Draw prior to antibiotics. Preliminary result      Allergies as of 1/22/2017        Reactions    Bactrim [Sulfamethoxazole-trimethoprim] Other (See Comments)    Causes renal failure    Nsaids (Non-steroidal Anti-inflammatory Drug) Other (See Comments)    Renal failure      Advance Directives     An advance directive is a document which, in the event you are no longer able to make decisions for yourself, tells your healthcare team what kind of treatment you do or do not want to receive, or who you would like to make those decisions " for you.  If you do not currently have an advance directive, [facility] encourages you to create one.  For more information call:  [phone]), [phone] ([phone]),  or log on to [email or web address].        Smoking Cessation     If you would like to quit smoking:   You may be eligible for free services if you are a [place] resident and started smoking cigarettes before September 1, 1988.  Call the Smoking Cessation Trust (SCT) toll free at (322) 459-9451 or (874) 758-9487.   Call 7-230-QUIT-NOW if you do not meet the above criteria.            Language Assistance Services     ATTENTION: Language assistance services are available, free of charge. Please call [phone].      ATENCIÓN: Si habla denisa, tiene a muniz disposición servicios gratuitos de asistencia lingüística. Llame al [phone].     CHÚ Ý: N?u b?n nói Ti?ng Vi?t, có các d?ch v? h? tr? ngôn ng? mi?n phí dành cho b?n. G?i s? [phone].        Heart Failure Education       Heart Failure: Being Active  You have a condition called heart failure. Being active doesnt mean that you have to wear yourself out. Even a little movement each day helps to strengthen your heart. If you cant get out to exercise, you can do simple stretching and strengthening exercises at home. These are good ways to keep you well-conditioned and prevent you and your heart from becoming excessively weak.    Ideas to get you started  · Add a little movement to things you do now. Walk to mail letters. Park your car at the far end of the parking lot and walk to the store. Walk up a flight of stairs instead of taking the elevator.  · Choose activities you enjoy. You might walk, swim, or ride an exercise bike. Things like gardening and washing the car count, too. Other possibilities include: washing dishes, walking the dog, walking around the mall, and doing aerobic activities with friends.  · Join a group exercise program at a [facility] or [facility], a  [facility], or a community center. Or look into a hospital cardiac rehabilitation program. Ask your doctor if you qualify.  Tips to keep you going  · Get up and get dressed each day. Go to a coffee shop and read a newspaper or go somewhere that you'll be in the presence of other active people. Youll feel more like being active.  · Make a plan. Choose one or more activities that you enjoy and that you can easily do. Then plan to do at least one each day. You might write your plan on a calendar.  · Go with a friend or a group if you like company. This can help you feel supported and stay motivated, too.  · Plan social events that you enjoy. This will keep you mentally engaged as well as physically motivated to do things you find pleasure in.  For your safety  · Talk with your healthcare provider before starting an exercise program.  · Exercise indoors when its too hot or too cold outside, or when the air quality is poor. Try walking at a shopping mall.  · Wear socks and sturdy shoes to maintain your balance and prevent falls.  · Start slowly. Do a few minutes several times a day at first. Increase your time and speed little by little.  · Stop and rest whenever you feel tired or get short of breath.  · Dont push yourself on days when you dont feel well.  © 0849-8268 [employer]. [address] [place] [address], [place] [address]. All rights reserved. This information is not intended as a substitute for professional medical care. Always follow your healthcare professional's instructions.              Heart Failure: Evaluating Your Heart  You have a condition called heart failure. To evaluate your condition, your doctor will examine you, ask questions, and do some tests. Along with looking for signs of heart failure, the doctor looks for any other health problems that may have led to heart failure. The results of your evaluation will help your doctor form a treatment plan.  Health history and physical  exam  Your visit will start with a health history. Tell the doctor about any symptoms youve noticed and about all medicines you take. Then youll have a physical exam. This includes listening to your heartbeat and breathing. Youll also be checked for swelling (edema) in your legs and neck. When you have fluid buildup or fluid in the lungs, it may be called congestive heart failure.  Diagnosing heart failure     During an echocardiogram, sound waves bounce off the heart. These are converted into a picture on the screen.   The following may be done to help your doctor form a diagnosis:  · X-rays show the size and shape of your heart. These pictures can also show fluid in your lungs.  · An electrocardiogram (ECG or EKG) shows the pattern of your heartbeat. Small pads (electrodes) are placed on your chest, arms, and legs. Wires connect the pads to the ECG machine, which records your hearts electrical signals. This can give the doctor information about heart function.  · An echocardiogram uses ultrasound waves to show the structure and movement of your heart muscle. This shows how well the heart pumps. It also shows the thickness of the heart walls, and if the heart is enlarged. It is one of the most useful, non-invasive tests as it provides information about the heart's general function. This helps your doctor make treatment decisions.  · Lab tests evaluate small amounts of blood or urine for signs of problems. A BNP lab test can help diagnose and evaluate heart failure. BNP stands for B-type natriuretic peptide. The ventricles secrete more BNP when heart failure worsens. Lab tests can also provide information about metabolic dysfunction or heart dysfunction.  Your treatment plan  Based on the results of your evaluation and tests, your doctor will develop a treatment plan. This plan is designed to relieve some of your heart failure symptoms and help make you more comfortable. Your treatment plan may  include:  · Medicine to help your heart work better and improve your quality of life  · Changes in what you eat and drink to help prevent fluid from backing up in your body  · Daily monitoring of your weight and heart failure symptoms to see how well your treatment plan is working  · Exercise to help you stay healthy  · Help with quitting smoking  · Emotional and psychological support to help adjust to the changes  · Referrals to other specialists to make sure you are being treated comprehensively  © 6571-1247 [employer]. [address] [place] [address], [place] [address]. All rights reserved. This information is not intended as a substitute for professional medical care. Always follow your healthcare professional's instructions.              Heart Failure: Making Changes to Your Diet  You have a condition called heart failure. When you have heart failure, excess fluid is more likely to build up in your body because your heart isn't working well. This makes the heart work harder to pump blood. Fluid buildup causes symptoms such as shortness of breath and swelling (edema). This is often referred to as congestive heart failure or CHF. Controlling the amount of salt (sodium) you eat may help stop fluid from building up. Your doctor may also tell you to reduce the amount of fluid you drink.  Reading food labels    Your healthcare provider will tell you how much sodium you can eat each day. Read food labels to keep track. Keep in mind that certain foods are high in salt. These include canned, frozen, and processed foods. Check the amount of sodium in each serving. Watch out for high-sodium ingredients. These include MSG (monosodium glutamate), baking soda, and sodium phosphate.   Eating less salt  Give yourself time to get used to eating less salt. It may take a little while. Here are some tips to help:  · Take the saltshaker off the table. Replace it with salt-free herb mixes and spices.  · Eat fresh or plain  frozen vegetables. These have much less salt than canned vegetables.  · Choose low-sodium snacks like sodium-free pretzels, crackers, or air-popped popcorn.  · Dont add salt to your food when youre cooking. Instead, season your foods with pepper, lemon, garlic, or onion.  · When you eat out, ask that your food be cooked without added salt.  · Avoid eating fried foods as these often have a great deal of salt.  If youre told to limit fluids  You may need to limit how much fluid you have to help prevent swelling. This includes anything that is liquid at room temperature, such as ice cream and soup. If your doctor tells you to limit fluid, try these tips:  · Measure drinks in a measuring cup before you drink them. This will help you meet daily goals.  · Chill drinks to make them more refreshing.  · Suck on frozen lemon wedges to quench thirst.  · Only drink when youre thirsty.  · Chew sugarless gum or suck on hard candy to keep your mouth moist.  · Weigh yourself daily to know if your body's fluid content is rising.  My sodium goal  Your healthcare provider may give you a sodium goal to meet each day. This includes sodium found in food as well as salt that you add. My goal is to eat no more than ___________ mg of sodium per day.     When to call your doctor  Call your doctor right away if you have any symptoms of worsening heart failure. These can include:  · Sudden weight gain  · Increased swelling of your legs or ankles  · Trouble breathing when youre resting or at night  · Increase in the number of pillows you have to sleep on  · Chest pain, pressure, discomfort, or pain in the jaw, neck, or back   © 4110-9143 [employer]. [address] [place] [address], [place] [address]. All rights reserved. This information is not intended as a substitute for professional medical care. Always follow your healthcare professional's instructions.              Heart Failure: Medicines to Help Your Heart    You have a  condition called heart failure (also known as congestive heart failure, or CHF). Your doctor will likely prescribe medicines for heart failure and any underlying health problems you have. Most heart failure patients take one or more types of medicinen. Your healthcare provider will work to find the combination of medicines that works best for you.  Heart failure medicines  Here are the most common heart failure medicines:  · ACE inhibitors lower blood pressure and decrease strain on the heart. This makes it easier for the heart to pump. Angiotensin receptor blockers have similar effects. These are prescribed for some patients instead of ACE inhibitors.  · Beta-blockers relieve stress on the heart. They also improve symptoms. They may also improve the heart's pumping action over time.  · Diuretics (also called water pills) help rid your body of excess water. This can help rid your body of swelling (edema). Having less fluid to pump means your heart doesnt have to work as hard. Some diuretics make your body lose a mineral called potassium. Your doctor will tell you if you need to take supplements or eat more foods high in potassium.  · Digoxin helps your heart pump with more strength. This helps your heart pump more blood with each beat. So, more oxygen-rich blood travels to the rest of the body.  · Aldosterone antagonists help alter hormones and decrease strain on the heart.  · Hydralazine and nitrates are two separate medicines used together to treat heart failure. They may come in one combination pill. They lower blood pressure and decrease how hard the heart has to pump.  Medicines for related conditions  Controlling other heart problems helps keep heart failure under control, too. Depending on other heart problems you have, medicines may be prescribed to:  · Lower blood pressure (antihypertensives).  · Lower cholesterol levels (statins).  · Prevent blood clots (anticoagulants or aspirin).  · Keep the  heartbeat steady (antiarrhythmics).  © 7279-4554 [employer]. [address] [place] [address], [place] [address]. All rights reserved. This information is not intended as a substitute for professional medical care. Always follow your healthcare professional's instructions.              Heart Failure: Procedures That May Help    The heart is a muscle that pumps oxygen-rich blood to all parts of the body. When you have heart failure, the heart is not able to pump as well as it should. Blood and fluid may back up into the lungs (congestive heart failure), and some parts of the body dont get enough oxygen-rich blood to work normally. These problems lead to the symptoms of heart failure.     Certain procedures may help the heart pump better in some cases of heart failure. Some procedures are done to treat health problems that may have caused the heart failure such as coronary artery disease or heart rhythm problems. For more serious heart failure, other options are available.  Treating artery and valve problems  If you have coronary artery disease or valve disease, procedures may be done to improve blood flow. This helps the heart pump better, which can improve heart failure symptoms. First, your doctor may do a cardiac catheterization to help detect clogged blood vessels or valve damage. During this procedure, a  thin tube (catheter) in inserted into a blood vessel and guided to the heart. There a dye is injected and a special type of X-ray (angiogram) is taken of the blood vessels. Procedures to open a blocked artery or fix damaged valves can also be done using catheterization.  · Angioplasty uses a balloon-tipped instrument at the end of the catheter. The balloon is inflated to widen the narrowed artery. In many cases, a stent is expanded to further support the narrowed artery. A stent is a metal mesh tube.  · Valve surgery repairs or replacement of faulty valves can also be done during catheterization so blood  can flow properly through the chambers of the heart.  Bypass surgery is another option to help treat blocked arteries. It uses a healthy blood vessel from elsewhere in the body. The healthy blood vessel is attached above and below the blocked area so that blood can flow around the blocked artery.  Treating heart rhythm problems  A device may be placed in the chest to help a weak heart maintain a healthy, heartbeat so the heart can pump more effectively:  · Pacemaker. A pacemaker is an implanted device that regulates your heartbeat electronically. It monitors your heart's rhythm and generates a painless electric impulse that helps the heart beat in a regular rhythm. A pacemaker is programmed to meet your specific heart rhythm needs.  · Biventricular pacing/cardiac resynchronization therapy. A type of pacemaker that paces both pumping chambers of the heart at the same time to coordinate contractions and to improve the heart's function. Some people with heart failure are candidates for this therapy.  · Implantable cardioverter defibrillator. A device similar to a pacemaker that senses when the heart is beating too fast and delivers an electrical shock to convert the fast rhythm to a normal rhythm. This can be a life saving device.  In severe cases  In more serious cases of heart failure when other treatments no longer work, other options may include:  · Ventricular assist devices (VADs). These are mechanical devices used to take over the pumping function for one or both of the heart's ventricles, or pumping chambers. A VAD may be necessary when heart failure progresses to the point that medicines and other treatments no longer help. In some cases, a VAD may be used as a bridge to transplant.  · Heart transplant. This is replacing the diseased heart with a healthy one from a donor. This is an option for a few people who are very sick. A heart transplant is very serious and not an option for all patients. Your doctor can  tell you more.  © 8389-0198 [employer]. [address] [place] [address], [place] [address]. All rights reserved. This information is not intended as a substitute for professional medical care. Always follow your healthcare professional's instructions.              Heart Failure: Tracking Your Weight  You have a condition called heart failure. When you have heart failure, a sudden weight gain or a steady rise in weight is a warning sign that your body is retaining too much water and salt. This could mean your heart failure is getting worse. If left untreated, it can cause problems for your lungs and result in shortness of breath. Weighing yourself each day is the best way to know if youre retaining water. If your weight goes up quickly, call your doctor. You will be given instructions on how to get rid of the excess water. You will likely need medicines and to avoid salt. This will help your heart work better.  Call your doctor if you gain more than 2 pounds in 1 day, more than 5 pounds in 1 week, or whatever weight gain you were told to report by your doctor. This is often a sign of worsening heart failure and needs to be evaluated and treated. Your doctor will tell you what to do next.   Tips for weighing yourself    · Weigh yourself at the same time each morning, wearing the same clothes. Weigh yourself after urinating and before eating.  · Use the same scale each day. Make sure the numbers are easy to read. Put the scale on a flat, hard surface -- not on a rug or carpet.  · Do not stop weighing yourself. If you forget one day, weigh again the next morning.  How to use your weight chart  · Keep your weight chart near the scale. Write your weight on the chart as soon as you get off the scale.  · Fill in the month and the start date on the chart. Then write down your weight each day. Your chart will look like this:    · If you miss a day, leave the space blank. Weigh yourself the next day and write your  weight in the next space.  · Take your weight chart with you when you go to see your doctor.  © 2844-3997 [employer]. [address] [place] [address], [place] [address]. All rights reserved. This information is not intended as a substitute for professional medical care. Always follow your healthcare professional's instructions.              Heart Failure: Warning Signs of a Flare-Up  You have a condition called heart failure. Once you have heart failure, flare-ups can happen. Below are signs that can mean your heart failure is getting worse. If you notice any of these warning signs, call your healthcare provider.  Swelling    · Your feet, ankles, or lower legs get puffier.  · You notice skin changes on your lower legs.  · Your shoes feel too tight.  · Your clothes are tighter in the waist.  · You have trouble getting rings on or off your fingers.  Shortness of breath  · You have to breathe harder even when youre doing your normal activities or when youre resting.  · You are short of breath walking up stairs or even short distances.  · You wake up at night short of breath or coughing.  · You need to use more pillows or sit up to sleep.  · You wake up tired or restless.  Other warning signs  · You feel weaker, dizzy, or more tired.  · You have chest pain or changes in your heartbeat.  · You have a cough that wont go away.  · You cant remember things or dont feel like eating.  Tracking your weight  Gaining weight is often the first warning sign that heart failure is getting worse. Gaining even a few pounds can be a sign that your body is retaining excess water and salt. Weighing yourself each day in the morning after you urinate and before you eat, is the best way to know if you're retaining water. Get a scale that is easy to read and make sure you wear the same clothes and use the same scale every time you weigh. Your healthcare provider will show you how to track your weight. Call your doctor if you  gain more than 2 pounds in 1 day, 5 pounds in 1 week, or whatever weight gain you were told to report by your doctor. This is often a sign of worsening heart failure and needs to be evaluated and treated before it compromises your breathing. Your doctor will tell you what to do next.    © 3731-9717 "[employer] [address] [place] [address], [place] [address]. All rights reserved. This information is not intended as a substitute for professional medical care. Always follow your healthcare professional's instructions.              Pneumonmia Discharge Instructions                Coumadin Discharge Instructions                         Chronic Kindey Disease Education             [facility] Sign-Up     Activating your [facility] account is as easy as 1-2-3!     1) Visit [email or web address], select Sign Up Now, enter this activation code and your date of birth, then select Next.  [email or web address]  Expires: 2/26/2017  1:57 PM      2) Create a username and password to use when you visit [facility] in the future and select a security question in case you lose your password and select Next.    3) Enter your e-mail address and click Sign Up!    Additional Information  If you have questions, please e-mail [email or web address] or call [phone] to talk to our [facility] staff. Remember, [facility] is NOT to be used for urgent needs. For medical emergencies, dial 911.          [facility] complies with applicable Federal civil rights laws and does not discriminate on the basis of race, color, national origin, age, disability, or sex.

## 2017-01-21 NOTE — PATIENT INSTRUCTIONS
Continue present respiratory medications (roles reviewed at today's visit).  Begin trial with Omeprazole 20 mg QAM.  Return visit 4 months with repeat Spirometry and DLCO.

## 2017-01-22 VITALS
OXYGEN SATURATION: 98 % | DIASTOLIC BLOOD PRESSURE: 78 MMHG | TEMPERATURE: 98 F | RESPIRATION RATE: 18 BRPM | HEIGHT: 68 IN | HEART RATE: 77 BPM | SYSTOLIC BLOOD PRESSURE: 113 MMHG | WEIGHT: 263 LBS | BODY MASS INDEX: 39.86 KG/M2

## 2017-01-22 PROBLEM — I95.9 HYPOTENSION: Status: RESOLVED | Noted: 2017-01-21 | Resolved: 2017-01-22

## 2017-01-22 LAB
ALBUMIN SERPL BCP-MCNC: 2.9 G/DL
ALBUMIN SERPL BCP-MCNC: 3 G/DL
ANION GAP SERPL CALC-SCNC: 13 MMOL/L
ANION GAP SERPL CALC-SCNC: 9 MMOL/L
BASOPHILS # BLD AUTO: 0.01 K/UL
BASOPHILS NFR BLD: 0.1 %
BUN SERPL-MCNC: 34 MG/DL
BUN SERPL-MCNC: 34 MG/DL
CALCIUM SERPL-MCNC: 9.3 MG/DL
CALCIUM SERPL-MCNC: 9.3 MG/DL
CHLORIDE SERPL-SCNC: 104 MMOL/L
CHLORIDE SERPL-SCNC: 105 MMOL/L
CO2 SERPL-SCNC: 16 MMOL/L
CO2 SERPL-SCNC: 22 MMOL/L
CREAT SERPL-MCNC: 5.5 MG/DL
CREAT SERPL-MCNC: 5.6 MG/DL
DIFFERENTIAL METHOD: ABNORMAL
EOSINOPHIL # BLD AUTO: 0 K/UL
EOSINOPHIL NFR BLD: 0.1 %
ERYTHROCYTE [DISTWIDTH] IN BLOOD BY AUTOMATED COUNT: 16.1 %
EST. GFR  (AFRICAN AMERICAN): 9.1 ML/MIN/1.73 M^2
EST. GFR  (AFRICAN AMERICAN): 9.3 ML/MIN/1.73 M^2
EST. GFR  (NON AFRICAN AMERICAN): 7.9 ML/MIN/1.73 M^2
EST. GFR  (NON AFRICAN AMERICAN): 8.1 ML/MIN/1.73 M^2
GLUCOSE SERPL-MCNC: 120 MG/DL
GLUCOSE SERPL-MCNC: 182 MG/DL
HCT VFR BLD AUTO: 33.2 %
HGB BLD-MCNC: 10 G/DL
INR PPP: 2.3
LYMPHOCYTES # BLD AUTO: 1.1 K/UL
LYMPHOCYTES NFR BLD: 13.7 %
MCH RBC QN AUTO: 30.8 PG
MCHC RBC AUTO-ENTMCNC: 30.1 %
MCV RBC AUTO: 102 FL
MONOCYTES # BLD AUTO: 0.5 K/UL
MONOCYTES NFR BLD: 6.1 %
NEUTROPHILS # BLD AUTO: 6.6 K/UL
NEUTROPHILS NFR BLD: 79.6 %
PHOSPHATE SERPL-MCNC: 3.9 MG/DL
PHOSPHATE SERPL-MCNC: 4.1 MG/DL
PLATELET # BLD AUTO: 251 K/UL
PMV BLD AUTO: 10.4 FL
POTASSIUM SERPL-SCNC: 4.9 MMOL/L
POTASSIUM SERPL-SCNC: 5.8 MMOL/L
POTASSIUM SERPL-SCNC: 7 MMOL/L
PROTHROMBIN TIME: 22.8 SEC
RBC # BLD AUTO: 3.25 M/UL
SODIUM SERPL-SCNC: 133 MMOL/L
SODIUM SERPL-SCNC: 136 MMOL/L
WBC # BLD AUTO: 8.3 K/UL

## 2017-01-22 PROCEDURE — 80069 RENAL FUNCTION PANEL: CPT | Mod: 91

## 2017-01-22 PROCEDURE — 25000003 PHARM REV CODE 250: Performed by: PHYSICIAN ASSISTANT

## 2017-01-22 PROCEDURE — 63600175 PHARM REV CODE 636 W HCPCS: Performed by: PHYSICIAN ASSISTANT

## 2017-01-22 PROCEDURE — G0378 HOSPITAL OBSERVATION PER HR: HCPCS

## 2017-01-22 PROCEDURE — 36415 COLL VENOUS BLD VENIPUNCTURE: CPT

## 2017-01-22 PROCEDURE — 99217 PR OBSERVATION CARE DISCHARGE: CPT | Mod: ,,, | Performed by: PHYSICIAN ASSISTANT

## 2017-01-22 PROCEDURE — 85610 PROTHROMBIN TIME: CPT

## 2017-01-22 PROCEDURE — 85025 COMPLETE CBC W/AUTO DIFF WBC: CPT

## 2017-01-22 PROCEDURE — 84132 ASSAY OF SERUM POTASSIUM: CPT

## 2017-01-22 RX ADMIN — LEVETIRACETAM 500 MG: 500 TABLET, FILM COATED ORAL at 08:01

## 2017-01-22 RX ADMIN — PREDNISONE 10 MG: 10 TABLET ORAL at 08:01

## 2017-01-22 RX ADMIN — SEVELAMER CARBONATE 800 MG: 800 TABLET, FILM COATED ORAL at 08:01

## 2017-01-22 RX ADMIN — PREDNISOLONE ACETATE 1 DROP: 10 SUSPENSION/ DROPS OPHTHALMIC at 08:01

## 2017-01-22 RX ADMIN — TIZANIDINE 4 MG: 2 TABLET ORAL at 08:01

## 2017-01-22 RX ADMIN — ASPIRIN 81 MG CHEWABLE TABLET 81 MG: 81 TABLET CHEWABLE at 06:01

## 2017-01-22 RX ADMIN — SEVELAMER CARBONATE 800 MG: 800 TABLET, FILM COATED ORAL at 12:01

## 2017-01-22 RX ADMIN — GABAPENTIN 300 MG: 300 CAPSULE ORAL at 06:01

## 2017-01-22 RX ADMIN — PANTOPRAZOLE SODIUM 40 MG: 40 TABLET, DELAYED RELEASE ORAL at 08:01

## 2017-01-22 RX ADMIN — KETOROLAC TROMETHAMINE 1 DROP: 5 SOLUTION OPHTHALMIC at 08:01

## 2017-01-22 NOTE — DISCHARGE SUMMARY
[facility] Hospital Medicine  Discharge Summary      Patient Name: Sisi Medel  MRN: [id number]  Admission Date: 1/21/2017  Hospital Length of Stay: 0 days  Discharge Date and Time: 1/22/2017 3:12 PM  Attending Physician: No att. 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  Pt reports she was hypotensive at her last HD session on Thursday and did not complete her HD session.      In the ED, CBC unrevealing. Electrolytes wnl. Troponin elevated in context of ESRD.  EKG underlying AFIB, rate 66 bpm, intermittent BiV pacing detected.  Cardiology consulted and completed bedside echo without evidence for effusion/tamponade.  PPM interrogation shows pacemaker functioning appropriately. Patient admitted to observation for dialysis. Nephrology consulted.    Pt denies fever, chills, CP, SOB, diaphoresis.  Chest discomfort at site of pacemaker insertion.  +nonproductive cough.  +epigastric pain.  No N/V/D/constipation.         * No surgery found *      Indwelling Lines/Drains at time of discharge:   Lines/Drains/Airways     Drain                 Hemodialysis AV Graft Left upper arm -- days         Hemodialysis AV Graft Left upper arm -- days         Hemodialysis AV Fistula 11/27/16 0920 Left upper arm 56 days              Hospital Course:   Pt admitted to observation for hypotension.  Cardiology evaluation completed in the ED:  no pericardial effusion on limited bedside echo, pacemaker is functioning appropriately; trop elevation is chronic and in context of ESRD, denies any chest pain or other anginal  equivalent- no need to continue to trend unless clinically changes; patient has h/o hypotension veto-HD, recommend optimization of fluid status per medicine/nephrology.  1/21 1L fluid removed in HD.  1/22 pt stable for discharge.  Repeat K and INR WNL at time of discharge.          Consults:   Consults         Status Ordering Provider     Inpatient consult to Cardiology  Once     Provider:  (Not yet assigned)    Final result ERIC SAWYER     Inpatient consult to Nephrology  Once     Provider:  (Not yet assigned)    Completed QUINTON BUCKLEY          Significant Diagnostic Studies: Labs:   BMP:   Recent Labs  Lab 01/21/17  0801 01/22/17  0621 01/22/17  0934 01/22/17  1042   * 120* 182*  --    * 136 133*  --    K 4.4 5.8* 7.0* 4.9   CL 96 105 104  --    CO2 27 22* 16*  --    BUN 52* 34* 34*  --    CREATININE 7.6* 5.5* 5.6*  --    CALCIUM 9.2 9.3 9.3  --    MG 2.2  --   --   --        Pending Diagnostic Studies:     None        Final Active Diagnoses:    Diagnosis Date Noted POA    ESRD on hemodialysis [N18.6, Z99.2] 02/23/2016 Not Applicable     Chronic    Anemia in ESRD (end-stage renal disease) [N18.6, D63.1] 03/07/2016 Yes     Chronic    Paroxysmal atrial fibrillation [I48.0] 04/06/2015 Yes    Chronic combined systolic and diastolic heart failure [I50.42] 06/14/2013 Yes     Chronic    Secondary pulmonary hypertension [I27.2] 04/07/2015 Yes     Chronic    Essential hypertension [I10]  Yes     Chronic    Seizure disorder [G40.909] 03/19/2013 Yes     Chronic    Sarcoid [D86.9] 04/06/2015 Yes     Chronic    Hyperlipidemia [E78.5] 03/07/2016 Yes     Chronic      Problems Resolved During this Admission:    Diagnosis Date Noted Date Resolved POA    PRINCIPAL PROBLEM:  Hypotension [I95.9] 01/21/2017 01/22/2017 Yes      No new Assessment & Plan notes have been filed under this hospital service since the last note was generated.  Service: Hospital Medicine      Discharged Condition:  good    Disposition: Home or Self Care    Follow Up:  Follow-up Information     Follow up with Carlos A Caputo MD In 1 week.    Specialty:  Internal Medicine    Contact information:    [address]  [facility] [address] [phone]          Patient Instructions:     Diet general   Order Specific Question Answer Comments   Additional restrictions: Renal      Activity as tolerated       Medications:  Reconciled Home Medications:   Discharge Medication List as of 1/22/2017  1:56 PM      CONTINUE these medications which have NOT CHANGED    Details   ACZONE 5 % topical gel Apply to face every morning, Starting 1/6/2016, Until Discontinued, Historical Med      ammonium lactate (LAC-HYDRIN) 12 % lotion Apply topically as needed (to legs). , Starting 1/5/2016, Until Discontinued, Historical Med      aspirin 81 MG Chew Take 81 mg by mouth every morning., Until Discontinued, Historical Med      atorvastatin (LIPITOR) 80 MG tablet Take 1 tablet (80 mg total) by mouth once daily., Starting 9/1/2016, Until Discontinued, Normal      gabapentin (NEURONTIN) 300 MG capsule Take 1 capsule (300 mg total) by mouth 3 (three) times daily., Starting 10/31/2016, Until Sun 1/29/17, Normal      ketorolac 0.5% (ACULAR) 0.5 % Drop instill one drop into both eyes every morning, Historical Med      levetiracetam (KEPPRA) 500 MG Tab Take 1 tablet (500 mg total) by mouth 2 (two) times daily., Starting 8/18/2016, Until Discontinued, Normal      prednisoLONE acetate (PRED FORTE) 1 % DrpS INSTILL ONE DROP INTO  LEFT EYE  TWICE A DAY, Historical Med      predniSONE (DELTASONE) 10 MG tablet Takes one 10 mg tablet every morning, Normal      RENVELA 800 mg Tab TAKE 1 TABLET BY MOUTH THREE TIMES DAILY WITH MEALS, Normal      tizanidine 4 mg Cap Take 1 capsule by mouth 2 (two) times daily as needed., Starting 1/6/2017, Until Discontinued, Normal      warfarin (COUMADIN) 5 MG tablet TAKE 1 TABLET BY MOUTH EVERYDAY EXCEPT 1 AND 1/2 TABLETS ON  MONDAY AND FRIDAY OR AS DIRECTED BY CLINIC, Normal      lactulose (CHRONULAC) 20 gram/30 mL Soln Take 15 mLs (10 g total) by mouth 3 (three) times daily as needed (for constipation)., Starting 9/1/2016, Until Discontinued, Normal      metoprolol tartrate (LOPRESSOR) 25 MG tablet Take 1 tablet (25 mg total) by mouth 2 (two) times daily., Starting 11/19/2016, Until Sun 11/19/17, Normal      omeprazole (PRILOSEC) 20 MG capsule Take 1 capsule (20 mg total) by mouth once daily., Starting 1/20/2017, Until Sat 1/20/18, Normal         STOP taking these medications       allopurinol (ZYLOPRIM) 100 MG tablet Comments:   Reason for Stopping:             Time spent on the discharge of patient: >30 minutes    Emily Laguna PA-C  Department of Hospital Medicine  [facility]

## 2017-01-22 NOTE — PROGRESS NOTES
Pt transferred to dialysis via wheelchair aaox3; pt denies any pain or discomfort; skin dry and intact; report given to receiving nurse;

## 2017-01-22 NOTE — PLAN OF CARE
Problem: Patient Care Overview  Goal: Plan of Care Review  Outcome: Outcome(s) achieved Date Met:  01/22/17  Fall precaution maintained thru out hospital stay; pt denies any pain or discomfort; dialysis site positive, positive; skin dry and intact; fluid balanced maintained;

## 2017-01-22 NOTE — NURSING
Pt d/c to home per md; discharged instructions given to patient; pt verbalized understanding; no sign of distressed noted; skin dry and intact; pt denies any pain or discomfort; personal belonging with pt; iv d/c per protocol.

## 2017-01-22 NOTE — PLAN OF CARE
Problem: Patient Care Overview  Goal: Plan of Care Review  Outcome: Ongoing (interventions implemented as appropriate)  Pt is aaox3. Vss. bp improved. Asymptomatic. No c/o dizziness or weakness. Bed is low and locked. Close bp monitoring . Instructed to call if pt develops any symptoms. Will monitor telemetry.

## 2017-01-23 ENCOUNTER — TELEPHONE (OUTPATIENT)
Dept: ELECTROPHYSIOLOGY | Facility: CLINIC | Age: 57
End: 2017-01-23

## 2017-01-23 NOTE — TELEPHONE ENCOUNTER
Returned patients call this afternoon.  Patient was calling in relation to a Remote ILR appointment that is scheduled on 1/27/17.  Informed the patient this appointment will be cancelled due to she now has a pacemaker.  Patient verbalized understanding.

## 2017-01-23 NOTE — TELEPHONE ENCOUNTER
----- Message from Sherlyn Branch MA sent at 1/23/2017 12:19 PM CST -----  Contact: self  Pt.has an appt.on 1/27 home monitor device.Wants to speak to you about it. Please call [phone]

## 2017-01-25 ENCOUNTER — TELEPHONE (OUTPATIENT)
Dept: ADMINISTRATIVE | Facility: HOSPITAL | Age: 57
End: 2017-01-25

## 2017-01-25 NOTE — TELEPHONE ENCOUNTER
Please be advised as follows:    Per PHN: I did a post hospital follow up call with the patient and reviewed medications. Metoprolol is listed as a current medication. Patient reports she does not take medication and has not taken in some time, unable to report last time. Please advise patient. In addition she reported a fall 2 weeks ago not reported to MD, unable to report exact date, denies injury.    Thanks,  Angela Gaona, RN  RN Navigator  [facility]

## 2017-01-26 LAB
BACTERIA BLD CULT: NORMAL
BACTERIA BLD CULT: NORMAL

## 2017-01-26 NOTE — TELEPHONE ENCOUNTER
Will forward message to patient's cardiologist to advise given recent pacemaker placement as well as recent hospitalizations for hypotension and atrial fibrillation.

## 2017-01-27 ENCOUNTER — LAB VISIT (OUTPATIENT)
Dept: LAB | Facility: HOSPITAL | Age: 57
End: 2017-01-27
Attending: INTERNAL MEDICINE
Payer: MEDICARE

## 2017-01-27 ENCOUNTER — OFFICE VISIT (OUTPATIENT)
Dept: INTERNAL MEDICINE | Facility: CLINIC | Age: 57
End: 2017-01-27
Payer: MEDICARE

## 2017-01-27 ENCOUNTER — ANTI-COAG VISIT (OUTPATIENT)
Dept: CARDIOLOGY | Facility: CLINIC | Age: 57
End: 2017-01-27

## 2017-01-27 VITALS
WEIGHT: 278.44 LBS | BODY MASS INDEX: 42.2 KG/M2 | SYSTOLIC BLOOD PRESSURE: 80 MMHG | HEIGHT: 68 IN | DIASTOLIC BLOOD PRESSURE: 53 MMHG | OXYGEN SATURATION: 94 % | HEART RATE: 75 BPM

## 2017-01-27 DIAGNOSIS — J96.11 CHRONIC RESPIRATORY FAILURE WITH HYPOXIA: ICD-10-CM

## 2017-01-27 DIAGNOSIS — K21.00 GASTROESOPHAGEAL REFLUX DISEASE WITH ESOPHAGITIS: ICD-10-CM

## 2017-01-27 DIAGNOSIS — N18.6 ESRD ON HEMODIALYSIS: Primary | Chronic | ICD-10-CM

## 2017-01-27 DIAGNOSIS — I48.0 PAROXYSMAL ATRIAL FIBRILLATION: ICD-10-CM

## 2017-01-27 DIAGNOSIS — E03.8 SUBCLINICAL HYPOTHYROIDISM: ICD-10-CM

## 2017-01-27 DIAGNOSIS — I50.42 CHRONIC COMBINED SYSTOLIC AND DIASTOLIC HEART FAILURE: Chronic | ICD-10-CM

## 2017-01-27 DIAGNOSIS — G40.909 SEIZURE DISORDER: Chronic | ICD-10-CM

## 2017-01-27 DIAGNOSIS — M10.9 INTERVAL GOUT: ICD-10-CM

## 2017-01-27 DIAGNOSIS — E05.90 SUBCLINICAL HYPERTHYROIDISM: ICD-10-CM

## 2017-01-27 DIAGNOSIS — R73.02 IMPAIRED GLUCOSE TOLERANCE: ICD-10-CM

## 2017-01-27 DIAGNOSIS — Z99.2 ESRD ON HEMODIALYSIS: Primary | Chronic | ICD-10-CM

## 2017-01-27 DIAGNOSIS — Z79.01 LONG-TERM (CURRENT) USE OF ANTICOAGULANTS: ICD-10-CM

## 2017-01-27 DIAGNOSIS — N18.6 ESRD ON HEMODIALYSIS: ICD-10-CM

## 2017-01-27 DIAGNOSIS — D86.9 SARCOIDOSIS: Chronic | ICD-10-CM

## 2017-01-27 DIAGNOSIS — E66.01 MORBID OBESITY WITH BMI OF 40.0-44.9, ADULT: ICD-10-CM

## 2017-01-27 DIAGNOSIS — Z99.2 ESRD ON HEMODIALYSIS: ICD-10-CM

## 2017-01-27 DIAGNOSIS — N25.81 SECONDARY HYPERPARATHYROIDISM OF RENAL ORIGIN: ICD-10-CM

## 2017-01-27 DIAGNOSIS — I10 ESSENTIAL HYPERTENSION: Chronic | ICD-10-CM

## 2017-01-27 DIAGNOSIS — G63 POLYNEUROPATHY ASSOCIATED WITH UNDERLYING DISEASE: ICD-10-CM

## 2017-01-27 LAB
ALBUMIN SERPL BCP-MCNC: 3.3 G/DL
ALP SERPL-CCNC: 131 U/L
ALT SERPL W/O P-5'-P-CCNC: 16 U/L
ANION GAP SERPL CALC-SCNC: 13 MMOL/L
AST SERPL-CCNC: 22 U/L
BASOPHILS # BLD AUTO: 0.02 K/UL
BASOPHILS NFR BLD: 0.2 %
BILIRUB SERPL-MCNC: 0.8 MG/DL
BUN SERPL-MCNC: 32 MG/DL
CALCIUM SERPL-MCNC: 9.6 MG/DL
CHLORIDE SERPL-SCNC: 93 MMOL/L
CO2 SERPL-SCNC: 30 MMOL/L
CREAT SERPL-MCNC: 6.1 MG/DL
DIFFERENTIAL METHOD: ABNORMAL
EOSINOPHIL # BLD AUTO: 0.1 K/UL
EOSINOPHIL NFR BLD: 0.5 %
ERYTHROCYTE [DISTWIDTH] IN BLOOD BY AUTOMATED COUNT: 16.6 %
EST. GFR  (AFRICAN AMERICAN): 8.2 ML/MIN/1.73 M^2
EST. GFR  (NON AFRICAN AMERICAN): 7.1 ML/MIN/1.73 M^2
GLUCOSE SERPL-MCNC: 298 MG/DL
HCT VFR BLD AUTO: 33.2 %
HGB BLD-MCNC: 10.4 G/DL
INR PPP: 1.6
LYMPHOCYTES # BLD AUTO: 2.2 K/UL
LYMPHOCYTES NFR BLD: 19.7 %
MCH RBC QN AUTO: 30.9 PG
MCHC RBC AUTO-ENTMCNC: 31.3 %
MCV RBC AUTO: 99 FL
MONOCYTES # BLD AUTO: 1.1 K/UL
MONOCYTES NFR BLD: 9.7 %
NEUTROPHILS # BLD AUTO: 7.7 K/UL
NEUTROPHILS NFR BLD: 69.5 %
PLATELET # BLD AUTO: 302 K/UL
PMV BLD AUTO: 10.8 FL
POTASSIUM SERPL-SCNC: 4.8 MMOL/L
PROT SERPL-MCNC: 7.2 G/DL
PROTHROMBIN TIME: 16 SEC
RBC # BLD AUTO: 3.37 M/UL
SODIUM SERPL-SCNC: 136 MMOL/L
T4 FREE SERPL-MCNC: 0.9 NG/DL
TSH SERPL DL<=0.005 MIU/L-ACNC: 0.42 UIU/ML
URATE SERPL-MCNC: 4.6 MG/DL
WBC # BLD AUTO: 11.13 K/UL

## 2017-01-27 PROCEDURE — 99499 UNLISTED E&M SERVICE: CPT | Mod: S$GLB,,, | Performed by: INTERNAL MEDICINE

## 2017-01-27 PROCEDURE — 99214 OFFICE O/P EST MOD 30 MIN: CPT | Mod: S$GLB,,, | Performed by: INTERNAL MEDICINE

## 2017-01-27 PROCEDURE — 99999 PR PBB SHADOW E&M-EST. PATIENT-LVL III: CPT | Mod: PBBFAC,,, | Performed by: INTERNAL MEDICINE

## 2017-01-27 PROCEDURE — 1159F MED LIST DOCD IN RCRD: CPT | Mod: S$GLB,,, | Performed by: INTERNAL MEDICINE

## 2017-01-27 RX ORDER — MIDODRINE HYDROCHLORIDE 5 MG/1
15 TABLET ORAL
Status: ON HOLD | COMMUNITY
Start: 2017-01-21 | End: 2017-02-11

## 2017-01-27 RX ORDER — ECONAZOLE NITRATE 10 MG/G
CREAM TOPICAL 2 TIMES DAILY
COMMUNITY
Start: 2017-01-10 | End: 2017-04-28

## 2017-01-27 RX ORDER — AMOXICILLIN AND CLAVULANATE POTASSIUM 875; 125 MG/1; MG/1
TABLET, FILM COATED ORAL
Status: ON HOLD | COMMUNITY
Start: 2017-01-05 | End: 2017-02-11 | Stop reason: HOSPADM

## 2017-01-27 RX ORDER — FLUDROCORTISONE ACETATE 0.1 MG/1
TABLET ORAL
Status: ON HOLD | COMMUNITY
Start: 2017-01-21 | End: 2017-02-11 | Stop reason: HOSPADM

## 2017-01-27 NOTE — PROGRESS NOTES
Subjective:       Patient ID: Sisi Medel is a 56 y.o. female.    Chief Complaint: Follow-up    HPI  57 y/o woman with h/o sarcoidosis, chronic combined CHF (most recent EF 40-45%), PAF, HTN, ESRD on HD MWF, obesity, YOANDY, chronic respiratory failure here for follow-up.     2 hospitalizations since last visit - one planned 1/11 for placement of BiV pacemaker, one unplanned 1/21 for hypotension.     Hospital follow-up - continuing to improve, walking with walker some. Requiring 4-5L when walking, 2L NC O2 when at rest.     Atrial fibrillation, CHF - onset 6/2016, underwent JARAD/DC cardioversion at that time but has been in paroxysmal AF since. Implanted loop recorder placed earlier this year (now removed). Pacemaker placed 1/11. Prescribed ASA, coumadin, metoprolol 25mg twice daily. Following with cardiology, coumadin clinic.   Today patient says she has not been taking metoprolol for some time (at least a month) because of low blood pressure.   Dr Tesfaye has been prescribed midodrine to take on HD days.     ESRD on HD TTS - follows with Dr Aaron Tesfaye for dialysis.  Taking renvela. Elevated PTH.  Vitamin D low on recent checks.     Sarcoidosis, chronic hypoxia -  saw Dr Harvey with Pulm since last visit, tapered down to 10mg. PFTs stable per pulmonology note. Chronically on supplemental O2 as above. Planned for follow-up with Pulm at 4 months. Recommended to begin PPI to treat GERD (for recent cough), feels that this is helping, and that her previous heartburn has improved.     Neck muscle spasm - taking tizanidine daily.     Neuropathy - taking gabapentin 300mg TID, this was increased by her podiatrist.     Seizure disorder - taking keppra 500mg twice daily, no seizures recently.    YOANDY - noted prior to 2013, reported to be prescribed CPAP at 5cm but patient today says she only used this a few times, didn't tolerate it. She does not use CPAP and says she doesn't believe she has sleep apnea.     Other  "issues, not addressed in detail today:  Subclinical hyperthyroidism - low TSH, normal free T4 in 6/2016, TSH low-normal on labs today.   Gout- taking allopurinol, no recent flare.  Vitamin D deficiency  H/o DM / borderline DM - A1c ranging from 5.5 to 6.7 over past 4 years, most recently 5.7. Not on medications for this.    Review of Systems   Constitutional: Positive for fatigue. Negative for activity change (less active than before going on HD, but better than last visit), appetite change and fever.   HENT: Negative.    Eyes: Negative for visual disturbance.   Respiratory: Positive for shortness of breath (baseline; improved since last visit). Negative for cough.    Cardiovascular: Negative for chest pain, palpitations and leg swelling.   Gastrointestinal: Negative for abdominal pain, constipation, diarrhea and nausea.   Endocrine: Negative.    Genitourinary: Positive for decreased urine volume (little to no urination (on HD)).   Musculoskeletal: Positive for arthralgias, gait problem and myalgias (muscle aches, foot pain, foot cramps, neck cramps since starting HD ).   Skin: Negative for rash.   Neurological: Positive for dizziness, weakness (generalized, intermittent), light-headedness and numbness (both feet). Negative for seizures and speech difficulty.   Psychiatric/Behavioral: Positive for dysphoric mood (related to health issues).         Past medical history, surgical history, and family medical history reviewed and updated as appropriate.    Medications and allergies reviewed.     Objective:          Vitals:    01/27/17 0809 01/27/17 0832   BP: (!) 80/53    BP Location: Right arm    Patient Position: Sitting    Pulse: 75    SpO2: (!) 87% (!) 94%   Weight: 126.3 kg (278 lb 7.1 oz)    Height: 5' 8" (1.727 m)      Body mass index is 42.34 kg/(m^2).  Physical Exam   Constitutional: She is oriented to person, place, and time. She appears well-developed and well-nourished. No distress.   Pleasant obese woman in " wheelchair, appears tired   HENT:   Head: Normocephalic and atraumatic.   Mouth/Throat: Oropharynx is clear and moist.   Eyes: Conjunctivae and EOM are normal. Pupils are equal, round, and reactive to light.   Neck: Normal range of motion. Neck supple. No JVD present.   Cardiovascular: Normal rate.  An irregularly irregular rhythm present.   No murmur heard.  Pulmonary/Chest: Effort normal. No respiratory distress. She has no wheezes. She has no rales.   On portable O2   Abdominal: Soft. Bowel sounds are normal. She exhibits no distension. There is no tenderness.   Musculoskeletal: Normal range of motion. She exhibits edema (1+ edema at ankles). She exhibits no tenderness.   Lymphadenopathy:     She has no cervical adenopathy.   Neurological: She is alert and oriented to person, place, and time. She has normal strength. No cranial nerve deficit or sensory deficit.   Gait not assessed, patient in wheelchair   Skin: Skin is warm and dry. She is not diaphoretic.   Psychiatric:   Fatigued, anxious/frustrated with health issues   Vitals reviewed.      Lab Results   Component Value Date    WBC 11.13 01/27/2017    HGB 10.4 (L) 01/27/2017    HCT 33.2 (L) 01/27/2017     01/27/2017    CHOL 189 05/17/2016    TRIG 69 05/17/2016    HDL 85 (H) 05/17/2016    ALT 16 01/27/2017    AST 22 01/27/2017     01/27/2017    K 4.8 01/27/2017    CL 93 (L) 01/27/2017    CREATININE 6.1 (H) 01/27/2017    BUN 32 (H) 01/27/2017    CO2 30 (H) 01/27/2017    TSH 0.425 01/27/2017    INR 1.6 (H) 01/27/2017    HGBA1C 5.7 05/17/2016       Assessment:       1. ESRD on hemodialysis    2. Subclinical hyperthyroidism    3. Impaired glucose tolerance    4. Gastroesophageal reflux disease with esophagitis    5. Paroxysmal atrial fibrillation    6. Chronic combined systolic and diastolic heart failure    7. Essential hypertension    8. Seizure disorder    9. Morbid obesity with BMI of 40.0-44.9, adult    10. Sarcoidosis    11. Polyneuropathy  associated with underlying disease    12. Chronic respiratory failure with hypoxia    13. Secondary hyperparathyroidism of renal origin        Plan:   Sisi was seen today for follow-up.    Diagnoses and all orders for this visit:    ESRD on hemodialysis - continue to follow with HD center and nephrologist. Requested she have recent labs from HD center faxed here; card given    Subclinical hyperthyroidism - will continue to follow on labs, normalized on last check    Impaired glucose tolerance - will continue to monitor A1c; not starting any medications for this at present  -     Hemoglobin A1c; Future    Gastroesophageal reflux disease with esophagitis - continue omeprazole prn    Paroxysmal atrial fibrillation  Chronic combined systolic and diastolic heart failure  Essential hypertension - BP too low to tolerate beta-blocker. Discussed via chart messages with her cardiologist; given that she is now requiring midodrine on HD days and BP is 80s/50s today, will continue to hold this    Seizure disorder - stable, no seizures, continue current medications    Morbid obesity with BMI of 40.0-44.9, adult - needs to watch fluid intake, diet; she has been more careful about this recently. Difficulty with weight loss due to diet restrictions as well as inability to be active with very low BP, hypoxia.    Sarcoidosis - continue current prednisone dose, continue to follow with Pulmonology  Polyneuropathy associated with underlying disease - continue gabapentin, as symptoms are notably worse if she misses this.    Chronic respiratory failure with hypoxia - continue home O2  Comments:  due to sarcoidosis    Secondary hyperparathyroidism of renal origin - continue to follow with nephrology, continue vitamin D. Declines DEXA for now, feeling overwhelmed with recent health problems.     Health maintenance reviewed with patient. DEXA added to list - will discuss again at future visit    Return in about 3 months (around 4/28/2017)  for follow up.    Carlos A Caputo MD  Internal Medicine  [facility]  1/27/2017

## 2017-01-27 NOTE — PROGRESS NOTES
Patient admitted 1/21-1/22 for HTN. No warfarin administered while inpatient. Patient stated she is taking  2.5mg daily x 5mg Sun/Thur since discharge.

## 2017-01-27 NOTE — PATIENT INSTRUCTIONS
Talk with Dr Tesfaye about your tizanidine and how to take this safely while on dialysis.     Please ask Dr Tesfaye to send over a copy of your recent labs done through dialysis as well as any notes, medication changes.

## 2017-01-27 NOTE — MR AVS SNAPSHOT
[facility] Internal Medicine  [address]  [facility] [address]  Phone: [phone]  Fax: [phone]                  Sisi Medel   2017 8:00 AM   Office Visit    Description:  Female : 1960   Provider:  Carlos A Caputo MD   Department:  [facility] - Internal Medicine           Reason for Visit     Follow-up           Diagnoses this Visit        Comments    ESRD on hemodialysis    -  Primary     Subclinical hyperthyroidism         Impaired glucose tolerance                To Do List           Future Appointments        Provider Department Dept Phone    2017 8:00 AM Carlos A Caputo MD [facility] Internal Medicine [phone]    2017 12:30 PM EKG, APPT [facility] [phone]    2017 1:00 PM PACEMAKER, ICD [facility] Arrhythmia [phone]    2017 1:40 PM Bladimir Adorno MD [facility] Arrhythmia [phone]      Goals (5 Years of Data)     None      Follow-Up and Disposition     Return in about 3 months (around 2017) for follow up.      [facility]     [facility] On Call Nurse Care Line -  Assistance  Registered nurses in the [facility] On Call Center provide clinical advisement, health education, appointment booking, and other advisory services.  Call for this free service at [phone].             Medications           Message regarding Medications     Verify the changes and/or additions to your medication regime listed below are the same as discussed with your clinician today.  If any of these changes or additions are incorrect, please notify your healthcare provider.             Verify that the below list of medications is an accurate representation of the medications you are currently taking.  If none reported, the list may be blank. If incorrect, please contact your healthcare provider. Carry this list with you in case of emergency.           Current Medications     ACZONE 5 % topical gel Apply to face every morning     "ammonium lactate (LAC-HYDRIN) 12 % lotion Apply topically as needed (to legs).     aspirin 81 MG Chew Take 81 mg by mouth every morning.    atorvastatin (LIPITOR) 80 MG tablet Take 1 tablet (80 mg total) by mouth once daily.    gabapentin (NEURONTIN) 300 MG capsule Take 1 capsule (300 mg total) by mouth 3 (three) times daily.    ketorolac 0.5% (ACULAR) 0.5 % Drop instill one drop into both eyes every morning    lactulose (CHRONULAC) 20 gram/30 mL Soln Take 15 mLs (10 g total) by mouth 3 (three) times daily as needed (for constipation).    levetiracetam (KEPPRA) 500 MG Tab Take 1 tablet (500 mg total) by mouth 2 (two) times daily.    metoprolol tartrate (LOPRESSOR) 25 MG tablet Take 1 tablet (25 mg total) by mouth 2 (two) times daily.    omeprazole (PRILOSEC) 20 MG capsule Take 1 capsule (20 mg total) by mouth once daily.    prednisoLONE acetate (PRED FORTE) 1 % DrpS INSTILL ONE DROP INTO  LEFT EYE  TWICE A DAY    predniSONE (DELTASONE) 10 MG tablet Takes one 10 mg tablet every morning    RENVELA 800 mg Tab TAKE 1 TABLET BY MOUTH THREE TIMES DAILY WITH MEALS    tizanidine 4 mg Cap Take 1 capsule by mouth 2 (two) times daily as needed.    warfarin (COUMADIN) 5 MG tablet TAKE 1 TABLET BY MOUTH EVERYDAY EXCEPT 1 AND 1/2 TABLETS ON MONDAY AND FRIDAY OR AS DIRECTED BY CLINIC    amoxicillin-clavulanate 875-125mg (AUGMENTIN) 875-125 mg per tablet     econazole nitrate 1 % cream     fludrocortisone (FLORINEF) 0.1 mg Tab     midodrine (PROAMATINE) 5 MG Tab            Clinical Reference Information           Vital Signs - Last Recorded  Most recent update: 1/27/2017  8:32 AM by Carlos A Caputo MD    BP Pulse Ht Wt LMP SpO2    (!) 80/53 (BP Location: Right arm, Patient Position: Sitting) 75 5' 8" (1.727 m) 126.3 kg (278 lb 7.1 oz) (LMP Unknown) (!) 94%    BMI                42.34 kg/m2          Blood Pressure          Most Recent Value    BP  (!)  80/53      Allergies as of 1/27/2017     Bactrim " [Sulfamethoxazole-trimethoprim]    Nsaids (Non-steroidal Anti-inflammatory Drug)      Immunizations Administered on Date of Encounter - 1/27/2017     None      Orders Placed During Today's Visit     Future Labs/Procedures Expected by Expires    Hemoglobin A1c  1/27/2017 4/27/2017      Maintenance Dialysis History     Start End Type Comments Center    2/17/2016  [facility]            Current Dialysis Center Information     [facility] [address] Phone #:  [phone]    Contact:  [place]  [address] Fax #:  [phone]            [facility] Sign-Up     Activating your [facility] account is as easy as 1-2-3!     1) Visit [email or web address], select Sign Up Now, enter this activation code and your date of birth, then select Next.  [email or web address]  Expires: 2/26/2017  1:57 PM      2) Create a username and password to use when you visit [facility] in the future and select a security question in case you lose your password and select Next.    3) Enter your e-mail address and click Sign Up!    Additional Information  If you have questions, please e-mail [email or web address] or call [phone] to talk to our [facility] staff. Remember, [facility] is NOT to be used for urgent needs. For medical emergencies, dial 911.         Instructions    Talk with Dr Tesfaye about your tizanidine and how to take this safely while on dialysis.     Please ask Dr Tesfaye to send over a copy of your recent labs done through dialysis as well as any notes, medication changes.

## 2017-02-03 ENCOUNTER — ANTI-COAG VISIT (OUTPATIENT)
Dept: CARDIOLOGY | Facility: CLINIC | Age: 57
DRG: 252 | End: 2017-02-03
Payer: MEDICARE

## 2017-02-03 DIAGNOSIS — Z79.01 LONG-TERM (CURRENT) USE OF ANTICOAGULANTS: Primary | ICD-10-CM

## 2017-02-03 DIAGNOSIS — I48.0 PAROXYSMAL ATRIAL FIBRILLATION: ICD-10-CM

## 2017-02-03 LAB — INR PPP: 1.4 (ref 2–3)

## 2017-02-03 PROCEDURE — 85610 PROTHROMBIN TIME: CPT | Mod: QW,S$GLB,, | Performed by: PHARMACIST

## 2017-02-03 NOTE — PROGRESS NOTES
Patient denies any changes in diet, medications, or health that would effect warfarin therapy.  INR dropping despite last week's boost dose. I will increase her weekly dose of coumadin

## 2017-02-04 ENCOUNTER — HOSPITAL ENCOUNTER (INPATIENT)
Facility: HOSPITAL | Age: 57
LOS: 7 days | Discharge: HOME | DRG: 252 | End: 2017-02-11
Attending: EMERGENCY MEDICINE | Admitting: INTERNAL MEDICINE
Payer: MEDICARE

## 2017-02-04 DIAGNOSIS — T82.590A DIALYSIS AV FISTULA MALFUNCTION: Primary | ICD-10-CM

## 2017-02-04 DIAGNOSIS — T82.590A DIALYSIS AV FISTULA MALFUNCTION, INITIAL ENCOUNTER: ICD-10-CM

## 2017-02-04 DIAGNOSIS — I50.42 CHRONIC COMBINED SYSTOLIC AND DIASTOLIC HEART FAILURE: ICD-10-CM

## 2017-02-04 DIAGNOSIS — R73.02 IMPAIRED GLUCOSE TOLERANCE: ICD-10-CM

## 2017-02-04 DIAGNOSIS — I95.89 OTHER SPECIFIED HYPOTENSION: ICD-10-CM

## 2017-02-04 DIAGNOSIS — I48.0 PAROXYSMAL ATRIAL FIBRILLATION: Chronic | ICD-10-CM

## 2017-02-04 DIAGNOSIS — I95.9 HYPOTENSION: ICD-10-CM

## 2017-02-04 DIAGNOSIS — N18.6 ESRD ON HEMODIALYSIS: Chronic | ICD-10-CM

## 2017-02-04 DIAGNOSIS — G40.909 SEIZURE DISORDER: Chronic | ICD-10-CM

## 2017-02-04 DIAGNOSIS — R10.84 INTERMITTENT GENERALIZED ABDOMINAL PAIN: ICD-10-CM

## 2017-02-04 DIAGNOSIS — D63.1 ANEMIA IN ESRD (END-STAGE RENAL DISEASE): Chronic | ICD-10-CM

## 2017-02-04 DIAGNOSIS — T82.590S DIALYSIS AV FISTULA MALFUNCTION, SEQUELA: ICD-10-CM

## 2017-02-04 DIAGNOSIS — Z99.2 ESRD ON HEMODIALYSIS: Chronic | ICD-10-CM

## 2017-02-04 DIAGNOSIS — R07.9 CHEST PAIN: ICD-10-CM

## 2017-02-04 DIAGNOSIS — N18.6 ANEMIA IN ESRD (END-STAGE RENAL DISEASE): Chronic | ICD-10-CM

## 2017-02-04 DIAGNOSIS — N19 RENAL FAILURE: ICD-10-CM

## 2017-02-04 DIAGNOSIS — E55.9 VITAMIN D INSUFFICIENCY: ICD-10-CM

## 2017-02-04 DIAGNOSIS — T82.590D DIALYSIS AV FISTULA MALFUNCTION, SUBSEQUENT ENCOUNTER: ICD-10-CM

## 2017-02-04 DIAGNOSIS — D86.0 PULMONARY SARCOIDOSIS: Chronic | ICD-10-CM

## 2017-02-04 DIAGNOSIS — Z79.52 CURRENT CHRONIC USE OF SYSTEMIC STEROIDS: Chronic | ICD-10-CM

## 2017-02-04 PROBLEM — E27.40 ADRENAL INSUFFICIENCY: Status: ACTIVE | Noted: 2017-02-04

## 2017-02-04 LAB
ALBUMIN SERPL BCP-MCNC: 3.3 G/DL
ALP SERPL-CCNC: 132 U/L
ALT SERPL W/O P-5'-P-CCNC: 14 U/L
ANION GAP SERPL CALC-SCNC: 18 MMOL/L
AST SERPL-CCNC: 35 U/L
BASOPHILS # BLD AUTO: 0.04 K/UL
BASOPHILS NFR BLD: 0.5 %
BILIRUB SERPL-MCNC: 0.7 MG/DL
BNP SERPL-MCNC: 281 PG/ML
BUN SERPL-MCNC: 31 MG/DL
CALCIUM SERPL-MCNC: 10.3 MG/DL
CHLORIDE SERPL-SCNC: 92 MMOL/L
CO2 SERPL-SCNC: 24 MMOL/L
CORTIS SERPL-MCNC: 11.1 UG/DL
CREAT SERPL-MCNC: 7.1 MG/DL
DIFFERENTIAL METHOD: ABNORMAL
EOSINOPHIL # BLD AUTO: 0.2 K/UL
EOSINOPHIL NFR BLD: 2.3 %
ERYTHROCYTE [DISTWIDTH] IN BLOOD BY AUTOMATED COUNT: 15.2 %
EST. GFR  (AFRICAN AMERICAN): 6.9 ML/MIN/1.73 M^2
EST. GFR  (NON AFRICAN AMERICAN): 5.9 ML/MIN/1.73 M^2
GLUCOSE SERPL-MCNC: 147 MG/DL
HCT VFR BLD AUTO: 39.5 %
HGB BLD-MCNC: 12.3 G/DL
INR PPP: 1.6
LACTATE SERPL-SCNC: 3.6 MMOL/L
LYMPHOCYTES # BLD AUTO: 2.1 K/UL
LYMPHOCYTES NFR BLD: 24.8 %
MAGNESIUM SERPL-MCNC: 2.4 MG/DL
MCH RBC QN AUTO: 31.1 PG
MCHC RBC AUTO-ENTMCNC: 31.1 %
MCV RBC AUTO: 100 FL
MONOCYTES # BLD AUTO: 1.8 K/UL
MONOCYTES NFR BLD: 21.7 %
NEUTROPHILS # BLD AUTO: 4.2 K/UL
NEUTROPHILS NFR BLD: 50.5 %
PLATELET # BLD AUTO: 229 K/UL
PMV BLD AUTO: 11.4 FL
POCT GLUCOSE: 170 MG/DL (ref 70–110)
POTASSIUM SERPL-SCNC: 4.8 MMOL/L
PROT SERPL-MCNC: 8 G/DL
PROTHROMBIN TIME: 16 SEC
RBC # BLD AUTO: 3.95 M/UL
SODIUM SERPL-SCNC: 134 MMOL/L
TROPONIN I SERPL DL<=0.01 NG/ML-MCNC: 0.15 NG/ML
WBC # BLD AUTO: 8.38 K/UL

## 2017-02-04 PROCEDURE — 25000003 PHARM REV CODE 250: Performed by: EMERGENCY MEDICINE

## 2017-02-04 PROCEDURE — 96365 THER/PROPH/DIAG IV INF INIT: CPT

## 2017-02-04 PROCEDURE — 83735 ASSAY OF MAGNESIUM: CPT

## 2017-02-04 PROCEDURE — 85025 COMPLETE CBC W/AUTO DIFF WBC: CPT

## 2017-02-04 PROCEDURE — 87040 BLOOD CULTURE FOR BACTERIA: CPT | Mod: 59

## 2017-02-04 PROCEDURE — 99223 1ST HOSP IP/OBS HIGH 75: CPT | Mod: ,,, | Performed by: INTERNAL MEDICINE

## 2017-02-04 PROCEDURE — 02HV33Z INSERTION OF INFUSION DEVICE INTO SUPERIOR VENA CAVA, PERCUTANEOUS APPROACH: ICD-10-PCS | Performed by: EMERGENCY MEDICINE

## 2017-02-04 PROCEDURE — 93010 ELECTROCARDIOGRAM REPORT: CPT | Mod: ,,, | Performed by: INTERNAL MEDICINE

## 2017-02-04 PROCEDURE — 20000000 HC ICU ROOM

## 2017-02-04 PROCEDURE — 80053 COMPREHEN METABOLIC PANEL: CPT

## 2017-02-04 PROCEDURE — 63600175 PHARM REV CODE 636 W HCPCS: Performed by: EMERGENCY MEDICINE

## 2017-02-04 PROCEDURE — 82533 TOTAL CORTISOL: CPT

## 2017-02-04 PROCEDURE — 25000003 PHARM REV CODE 250: Performed by: STUDENT IN AN ORGANIZED HEALTH CARE EDUCATION/TRAINING PROGRAM

## 2017-02-04 PROCEDURE — 84484 ASSAY OF TROPONIN QUANT: CPT

## 2017-02-04 PROCEDURE — 36556 INSERT NON-TUNNEL CV CATH: CPT | Mod: ,,, | Performed by: EMERGENCY MEDICINE

## 2017-02-04 PROCEDURE — 83880 ASSAY OF NATRIURETIC PEPTIDE: CPT

## 2017-02-04 PROCEDURE — 63600175 PHARM REV CODE 636 W HCPCS: Performed by: STUDENT IN AN ORGANIZED HEALTH CARE EDUCATION/TRAINING PROGRAM

## 2017-02-04 PROCEDURE — 99292 CRITICAL CARE ADDL 30 MIN: CPT | Mod: 25,,, | Performed by: EMERGENCY MEDICINE

## 2017-02-04 PROCEDURE — 93005 ELECTROCARDIOGRAM TRACING: CPT

## 2017-02-04 PROCEDURE — 80069 RENAL FUNCTION PANEL: CPT

## 2017-02-04 PROCEDURE — 83735 ASSAY OF MAGNESIUM: CPT | Mod: 91

## 2017-02-04 PROCEDURE — 83605 ASSAY OF LACTIC ACID: CPT

## 2017-02-04 PROCEDURE — 96366 THER/PROPH/DIAG IV INF ADDON: CPT

## 2017-02-04 PROCEDURE — 96375 TX/PRO/DX INJ NEW DRUG ADDON: CPT

## 2017-02-04 PROCEDURE — 99285 EMERGENCY DEPT VISIT HI MDM: CPT

## 2017-02-04 PROCEDURE — 85610 PROTHROMBIN TIME: CPT

## 2017-02-04 PROCEDURE — 36556 INSERT NON-TUNNEL CV CATH: CPT

## 2017-02-04 PROCEDURE — 96368 THER/DIAG CONCURRENT INF: CPT

## 2017-02-04 PROCEDURE — 99291 CRITICAL CARE FIRST HOUR: CPT | Mod: 25,,, | Performed by: EMERGENCY MEDICINE

## 2017-02-04 RX ORDER — SODIUM CHLORIDE 0.9 % (FLUSH) 0.9 %
3 SYRINGE (ML) INJECTION EVERY 8 HOURS
Status: DISCONTINUED | OUTPATIENT
Start: 2017-02-04 | End: 2017-02-11 | Stop reason: HOSPADM

## 2017-02-04 RX ORDER — LEVETIRACETAM 500 MG/1
500 TABLET ORAL 2 TIMES DAILY
Status: DISCONTINUED | OUTPATIENT
Start: 2017-02-04 | End: 2017-02-11 | Stop reason: HOSPADM

## 2017-02-04 RX ORDER — MIDODRINE HYDROCHLORIDE 5 MG/1
10 TABLET ORAL 3 TIMES DAILY
Status: DISCONTINUED | OUTPATIENT
Start: 2017-02-04 | End: 2017-02-05

## 2017-02-04 RX ORDER — ATORVASTATIN CALCIUM 20 MG/1
80 TABLET, FILM COATED ORAL NIGHTLY
Status: DISCONTINUED | OUTPATIENT
Start: 2017-02-04 | End: 2017-02-11 | Stop reason: HOSPADM

## 2017-02-04 RX ORDER — MIDODRINE HYDROCHLORIDE 5 MG/1
TABLET ORAL
Status: DISPENSED
Start: 2017-02-04 | End: 2017-02-05

## 2017-02-04 RX ORDER — HYDROCODONE BITARTRATE AND ACETAMINOPHEN 500; 5 MG/1; MG/1
TABLET ORAL
Status: DISCONTINUED | OUTPATIENT
Start: 2017-02-04 | End: 2017-02-05

## 2017-02-04 RX ORDER — MIDODRINE HYDROCHLORIDE 5 MG/1
10 TABLET ORAL ONCE
Status: COMPLETED | OUTPATIENT
Start: 2017-02-04 | End: 2017-02-04

## 2017-02-04 RX ORDER — SEVELAMER CARBONATE 800 MG/1
800 TABLET, FILM COATED ORAL
Status: DISCONTINUED | OUTPATIENT
Start: 2017-02-04 | End: 2017-02-11 | Stop reason: HOSPADM

## 2017-02-04 RX ORDER — MAGNESIUM SULFATE HEPTAHYDRATE 40 MG/ML
2 INJECTION, SOLUTION INTRAVENOUS
Status: DISCONTINUED | OUTPATIENT
Start: 2017-02-04 | End: 2017-02-05

## 2017-02-04 RX ORDER — GABAPENTIN 300 MG/1
300 CAPSULE ORAL 3 TIMES DAILY
Status: DISCONTINUED | OUTPATIENT
Start: 2017-02-04 | End: 2017-02-11 | Stop reason: HOSPADM

## 2017-02-04 RX ADMIN — SODIUM CHLORIDE, PRESERVATIVE FREE 3 ML: 5 INJECTION INTRAVENOUS at 10:02

## 2017-02-04 RX ADMIN — MIDODRINE HYDROCHLORIDE 10 MG: 5 TABLET ORAL at 11:02

## 2017-02-04 RX ADMIN — HYDROCORTISONE SODIUM SUCCINATE 100 MG: 100 INJECTION, POWDER, FOR SOLUTION INTRAMUSCULAR; INTRAVENOUS at 11:02

## 2017-02-04 RX ADMIN — MIDODRINE HYDROCHLORIDE 10 MG: 5 TABLET ORAL at 12:02

## 2017-02-04 RX ADMIN — HYDROCORTISONE SODIUM SUCCINATE 100 MG: 100 INJECTION, POWDER, FOR SOLUTION INTRAMUSCULAR; INTRAVENOUS at 06:02

## 2017-02-04 RX ADMIN — PIPERACILLIN SODIUM AND TAZOBACTAM SODIUM 4.5 G: 4; .5 INJECTION, POWDER, FOR SOLUTION INTRAVENOUS at 05:02

## 2017-02-04 RX ADMIN — Medication 4.5 G: at 10:02

## 2017-02-04 RX ADMIN — ATORVASTATIN CALCIUM 80 MG: 20 TABLET, FILM COATED ORAL at 08:02

## 2017-02-04 RX ADMIN — GABAPENTIN 300 MG: 300 CAPSULE ORAL at 01:02

## 2017-02-04 RX ADMIN — SEVELAMER CARBONATE 800 MG: 800 TABLET, FILM COATED ORAL at 05:02

## 2017-02-04 RX ADMIN — GABAPENTIN 300 MG: 300 CAPSULE ORAL at 11:02

## 2017-02-04 RX ADMIN — VANCOMYCIN HYDROCHLORIDE 2500 MG: 1 INJECTION, POWDER, LYOPHILIZED, FOR SOLUTION INTRAVENOUS at 11:02

## 2017-02-04 RX ADMIN — LEVETIRACETAM 500 MG: 500 TABLET, FILM COATED ORAL at 08:02

## 2017-02-04 NOTE — CONSULTS
Consult Note  Nephrology    Consult Requested By: Kelvin Monroy, *  Reason for Consult: ESRD (TTS)    SUBJECTIVE:     History of Present Illness:  Patient is a 56 y.o. female with PmHX  ESRD on HD, HTN, pHTN on O2, sarcoidosis, paroxysmal AFIB, s/p recent (1/11/17) BiV pacemaker who presents to the ED from her dialysis unit when the techs at the center were unable to cannulate her AVG due to clotting of the access.  At the dialysis center she was having lightheadedness and BP was noted to be low, so she was sent to ED for evaluation.  On exam she is in no distress and oxygenating well on 2L NC.  She reports that she normally uses around 2-5L at home.  She reports she has been having difficulty with hypotension since she was initiated on dialysis.  She reports that she normally takes midodrine 15 mg prior to her HD sessions and has been unable to achieve her EDW due to hypotension.  Her AVG is 1 year old, and she reports that she has had issues with clotting one time in the past.  She does not report high venous pressures while on HD and she does not report increased bleeding post needle removal after treatment.  On exam today she does not appear septic, AOx4 without complaints.  A trialysis line was placed in anticipation for SLED upon transfer to the ICU.  BP continues to be low with SBP in the 80's .  Chest x-ray with chronic changes and mild amount of vascular congestion.  She has no edema on exam.  AVG to RAISSA with positive bruit at this time.    Past Medical History   Diagnosis Date    Anemia in ESRD (end-stage renal disease) 3/7/2016    Anticoagulant long-term use     Cervical radiculopathy 7/20/2015    CHF (congestive heart failure)     Chronic combined systolic and diastolic heart failure 6/14/2013     EF 20% 2013, improved with Medical therapy, negative PET 2013    Chronic respiratory failure with hypoxia 04/22/2013     On home oxygen at 2-5 liters    Chronic rhinitis 10/2/2013    DDD  (degenerative disc disease), lumbar 2015    ESRD on hemodialysis 2016    Essential hypertension     Gout     Hyperlipidemia 3/7/2016    Lateral meniscal tear 2016    Lumbar stenosis 2015    Morbid obesity 8/15/2013    Paroxysmal atrial fibrillation 2014     Not on anticoagulation    Peripheral neuropathy 2013    Personal history of fall 2014    Personal history of gastric ulcer 3/19/2013    Sarcoidosis, lung 2009     Diagnosed in  with ocular manifestation, on 4L home O2 and PO steroids    Secondary pulmonary hypertension 2015    Seizure disorder 3/19/2013    Seizures     Shingles 2013    Spondylarthrosis 2015    Thyroid disease      Past Surgical History   Procedure Laterality Date    Abdominal surgery      Vascular surgery       section       x2    Other surgical history       loop recorder implant    Loop recorder       Family History   Problem Relation Age of Onset    Kidney failure Mother     Hypertension Mother     Diabetes Mother     Coronary artery disease Father     Hypertension Father     Diabetes Father     Lupus Sister     Diabetes Maternal Grandmother     Colon cancer Neg Hx     Ovarian cancer Neg Hx     Breast cancer Neg Hx     Melanoma Neg Hx      Social History   Substance Use Topics    Smoking status: Former Smoker     Packs/day: 0.50     Years: 10.00     Types: Cigarettes     Quit date: 1994    Smokeless tobacco: Never Used    Alcohol use No       Review of patient's allergies indicates:   Allergen Reactions    Bactrim [sulfamethoxazole-trimethoprim] Other (See Comments)     Causes renal failure    Nsaids (non-steroidal anti-inflammatory drug) Other (See Comments)     Renal failure        Review of Systems:  Constitutional: Positive for fatigue. Negative for chills and fever.   HENT: Negative for congestion and sore throat.   Respiratory: Negative for cough and shortness of breath.    Cardiovascular: negative for palpitations, leg swelling, chest pain  Gastrointestinal: Negative for abdominal pain, nausea and vomiting.   Musculoskeletal: Negative for back pain and gait problem.   Skin: Negative for rash.   Neurological: Positive for weakness (generalized). Negative for numbness.   Hematological: Does not bruise/bleed easily.        OBJECTIVE:     Vital Signs (Most Recent)  Temp: 97.8 °F (36.6 °C) (02/04/17 0731)  Pulse: 64 (02/04/17 1532)  Resp: 16 (02/04/17 1532)  BP: (!) 89/61 (02/04/17 1532)  SpO2: 100 % (02/04/17 1532)    Vital Signs Range (Last 24H):  Temp:  [97.8 °F (36.6 °C)]   Pulse:  [57-77]   Resp:  [14-27]   BP: (68-96)/(44-66)   SpO2:  [93 %-100 %]     Physical Exam:  General:Obese AAF in NAD.  AOx4   HEENT: Normocephalic, atraumatic, EOM's intact bilaterally, external inspection of ears and nose normal, moist mucous membranes, no oral ulcerations/lesions  Respiratory: Bibasilar crackles, other lobes clear.  good respiratory effort, no increased work of breathing.  Cardiovacular: Irregular rate on monitor. Intermittent pacing.  Gastrointestinal: Soft, obese, BS x4.  Extremities: No edema to lower extremities.    Laboratory:  CBC:   Recent Labs  Lab 02/04/17  0804   WBC 8.38   RBC 3.95*   HGB 12.3   HCT 39.5      *   MCH 31.1*   MCHC 31.1*     BMP:   Recent Labs  Lab 02/04/17  0804   *   CL 92*   CO2 24   BUN 31*   CREATININE 7.1*   CALCIUM 10.3   MG 2.4         ASSESSMENT/PLAN:   Patient is a 56 y.o. female with PmHX  ESRD on HD, HTN, pHTN on O2, sarcoidosis, paroxysmal AFIB, s/p recent (1/11/17) BiV pacemaker implanted in anticipation of AVN RFA who presents to the ED from her dialysis unit when the techs at the center were unable to cannulate her AVG due to clotting of the access  Active Hospital Problems    Diagnosis  POA    *Hypotension [I95.9]  Yes    Anemia in ESRD (end-stage renal disease) [N18.6, D63.1]  Yes     Chronic    ESRD on hemodialysis [N18.6,  Z99.2]  Not Applicable     Chronic    Secondary pulmonary hypertension [I27.2]  Yes     Chronic    Paroxysmal atrial fibrillation [I48.0]  Yes     Chronic    Morbid obesity with BMI of 40.0-44.9, adult [E66.01, Z68.41]  Not Applicable     Chronic    Chronic combined systolic and diastolic heart failure [I50.42]  Yes     Chronic     EF 20% 2013, improved with Medical therapy, negative PET 2013      Pulmonary sarcoidosis [D86.0]  Yes     Chronic    Seizure disorder [G40.909]  Yes     Chronic      Resolved Hospital Problems    Diagnosis Date Resolved POA   No resolved problems to display.         Plan:   ESRD (TTS)  -Remains hypotensive in ED  -non-septic appearing.  Oxygenating well on 2L NC  -mild pulm. vasc congestion on x-ray  -oxygenating well on 2L NC  -trialysis line placed for anticipation of SLED   -when admitted to ICU, will start SLED for metabolic clearance and volume management given hypotension.  -recommend consulting vascular surgery  -+ bruit to AVG to RAISSA, remains with blood flow.  If hypotensive at dialysis center, Possibly BP too low to have sufficient flows through AVG  -Recommend starting midorine 10 mg TID    AYAN Joseph, TYRESE-BC  Nephrology  Pager:  [phone]    ATTENDING PHYSICIAN ATTESTATION  I have personally interviewed and examined the patient. I thoroughly reviewed the demographic, clinical, laboratorial and imaging information available in medical records. I agree with the assessment and recommendations provided by the IVETH Gallegos who was under my supervision.

## 2017-02-04 NOTE — ASSESSMENT & PLAN NOTE
- hx of tachy /mauro syndrome s/p pacemaker   - on coumadin at home , INR 1.6 , will hold coumadin in anticipation for procedure for clotted AV fistula.   - rate controlled

## 2017-02-04 NOTE — ASSESSMENT & PLAN NOTE
- Last echo (JARAD 6/27/16)    1 - Left atrial appendage is single lobed with no visualized thrombus.     2 - Normal left ventricular systolic function (EF 60-65%).     3 - Mild tricuspid regurgitation.

## 2017-02-04 NOTE — ASSESSMENT & PLAN NOTE
- ESRD on HD TTS for almost a year   - due for HD today but AV fistula clotted   - nephrology consulted and recommend CRRT due to hypotension   - will place temporary trialysis catheter for CRRT and follow up with nephrology for possible US of AVG and vascular surgery evaluation   - possible intervention on Monday for clotted fistula

## 2017-02-04 NOTE — ED NOTES
Pt identifiers Sisi Medel were checked and are correct  LOC: The patient is awake, alert, aware of environment with an appropriate affect. Oriented x3, speaking appropriately  APPEARANCE: Pt resting comfortably, in no acute distress, pt is clean and well groomed, clothing properly fastened  SKIN: Skin warm, dry, normal skin turgor, moist mucus membranes. Dressing noted to left upper arm dialysis port.  RESPIRATORY: Airway is open and patent, respirations are spontaneous, even and unlabored, normal effort and rate. Lung sounds clear bilaterally.  CARDIAC: Normal rate and rhythm, trace peripheral edema noted, capillary refill < 3 seconds, bilateral radial pulses 2+, pedal pulses 2+  ABDOMEN: Soft, nontender, nondistended. Bowel sounds present. Pt reports chronic constipation. Pt anuric.  NEUROLOGIC: PERRL, facial expression is symmetrical, patient moving all extremities spontaneously.  Follows all commands appropriately  MUSCULOSKELETAL: No obvious deformities.

## 2017-02-04 NOTE — SUBJECTIVE & OBJECTIVE
Past Medical History   Diagnosis Date    Anemia in ESRD (end-stage renal disease) 3/7/2016    Anticoagulant long-term use     Cervical radiculopathy 2015    CHF (congestive heart failure)     Chronic combined systolic and diastolic heart failure 2013     EF 20% , improved with Medical therapy, negative PET     Chronic respiratory failure with hypoxia 2013     On home oxygen at 2-5 liters    Chronic rhinitis 10/2/2013    DDD (degenerative disc disease), lumbar 2015    ESRD on hemodialysis 2016    Essential hypertension     Gout     Hyperlipidemia 3/7/2016    Lateral meniscal tear 2016    Lumbar stenosis 2015    Morbid obesity 8/15/2013    Paroxysmal atrial fibrillation 2014     Not on anticoagulation    Peripheral neuropathy 2013    Personal history of fall 2014    Personal history of gastric ulcer 3/19/2013    Sarcoidosis, lung 2009     Diagnosed in  with ocular manifestation, on 4L home O2 and PO steroids    Secondary pulmonary hypertension 2015    Seizure disorder 3/19/2013    Seizures     Shingles 2013    Spondylarthrosis 2015    Thyroid disease        Past Surgical History   Procedure Laterality Date    Abdominal surgery      Vascular surgery       section       x2    Other surgical history       loop recorder implant    Loop recorder         Review of patient's allergies indicates:   Allergen Reactions    Bactrim [sulfamethoxazole-trimethoprim] Other (See Comments)     Causes renal failure    Nsaids (non-steroidal anti-inflammatory drug) Other (See Comments)     Renal failure       Family History     Problem Relation (Age of Onset)    Coronary artery disease Father    Diabetes Mother, Father, Maternal Grandmother    Hypertension Mother, Father    Kidney failure Mother    Lupus Sister        Social History Main Topics    Smoking status: Former Smoker     Packs/day: 0.50     Years: 10.00      Types: Cigarettes     Quit date: 4/22/1994    Smokeless tobacco: Never Used    Alcohol use No    Drug use: No    Sexual activity: Not on file      Review of Systems   Constitutional: Positive for fatigue. Negative for fever.   HENT: Negative for congestion and sore throat.    Eyes: Negative for visual disturbance.   Respiratory: Negative for cough (resolving ), shortness of breath and wheezing.    Cardiovascular: Negative for palpitations.   Gastrointestinal: Negative for diarrhea, nausea and vomiting.   Endocrine: Negative for cold intolerance and heat intolerance.   Genitourinary:        Anuric    Musculoskeletal: Positive for arthralgias and myalgias.   Skin: Negative for rash.   Neurological: Positive for dizziness. Negative for tremors and syncope.   Psychiatric/Behavioral: Negative.      Objective:     Vital Signs (Most Recent):  Temp: 97.8 °F (36.6 °C) (02/04/17 0731)  Pulse: 76 (02/04/17 1202)  Resp: 14 (02/04/17 1132)  BP: 90/66 (02/04/17 1202)  SpO2: 100 % (02/04/17 1132) Vital Signs (24h Range):  Temp:  [97.8 °F (36.6 °C)] 97.8 °F (36.6 °C)  Pulse:  [57-76] 76  Resp:  [14-27] 14  SpO2:  [93 %-100 %] 100 %  BP: (68-96)/(44-66) 90/66   Weight: 120 kg (264 lb 8.8 oz)  Body mass index is 40.22 kg/(m^2).    No intake or output data in the 24 hours ending 02/04/17 1220    Physical Exam   Constitutional: She appears well-developed and well-nourished. No distress.   Obese body habitus   Skin: She is not diaphoretic.       Vents:     Lines/Drains/Airways     Drain                 Hemodialysis AV Graft Left upper arm -- days         Hemodialysis AV Graft Left upper arm -- days         Hemodialysis AV Fistula 11/27/16 0920 Left upper arm 69 days          Peripheral Intravenous Line                 Peripheral IV - Single Lumen 02/04/17 0753 Left Wrist less than 1 day         Peripheral IV - Single Lumen 02/04/17 0754 Left Hand less than 1 day              Significant Labs:    CBC/Anemia Profile:    Recent  Labs  Lab 02/04/17  0804   WBC 8.38   HGB 12.3   HCT 39.5      *   RDW 15.2*        Chemistries:    Recent Labs  Lab 02/04/17  0804   *   K 4.8   CL 92*   CO2 24   BUN 31*   CREATININE 7.1*   CALCIUM 10.3   ALBUMIN 3.3*   PROT 8.0   BILITOT 0.7   ALKPHOS 132   ALT 14   AST 35   MG 2.4       All pertinent labs within the past 24 hours have been reviewed.    Significant Imaging: I have reviewed all pertinent imaging results/findings within the past 24 hours.

## 2017-02-04 NOTE — H&P
[facility]  Critical Care Medicine  History & Physical    Patient Name: Sisi Medel  MRN: [id number]  Admission Date: 2/4/2017  Hospital Length of Stay: 0 days  Code Status: Full Code  Attending Physician: Kelvin Monroy, *   Primary Care Provider: Carlos A Caputo MD   Principal Problem: Hypotension    Subjective:     HPI:  Ms. Sisi Medel is a 56 y.o. 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  states that she went for dialysis today for HD but her AV fistula clotted and unable to perform HD and went home. She is brought by her mother because she is feeling fatigue and generally weak. She states that no attempt to de-clot AV fistula at dialysis center. Patient reports that her BP always low but not sure why, she takes midodrine 15 mg before dialysis that she took this am. She you cough, SOB, fever, chills or diarrhea.   On my evaluation patient resting comfortable on 2 L NC, BP 91/56 , HR 78 in afib and not in distress.       Hospital/ICU Course:  Patient will be admitted to MICU for CRRT due to hypotension.      Past Medical History   Diagnosis Date    Anemia in ESRD (end-stage renal disease) 3/7/2016    Anticoagulant long-term use     Cervical radiculopathy 7/20/2015    CHF (congestive heart failure)     Chronic combined systolic and diastolic heart failure 6/14/2013     EF 20% 2013, improved with Medical therapy, negative PET 2013    Chronic respiratory failure with hypoxia 04/22/2013     On home oxygen at 2-5 liters    Chronic rhinitis 10/2/2013    DDD (degenerative disc disease), lumbar 6/17/2015    ESRD on  hemodialysis 2016    Essential hypertension     Gout     Hyperlipidemia 3/7/2016    Lateral meniscal tear 2016    Lumbar stenosis 2015    Morbid obesity 8/15/2013    Paroxysmal atrial fibrillation 2014     Not on anticoagulation    Peripheral neuropathy 2013    Personal history of fall 2014    Personal history of gastric ulcer 3/19/2013    Sarcoidosis, lung 2009     Diagnosed in  with ocular manifestation, on 4L home O2 and PO steroids    Secondary pulmonary hypertension 2015    Seizure disorder 3/19/2013    Seizures     Shingles 2013    Spondylarthrosis 2015    Thyroid disease        Past Surgical History   Procedure Laterality Date    Abdominal surgery      Vascular surgery       section       x2    Other surgical history       loop recorder implant    Loop recorder         Review of patient's allergies indicates:   Allergen Reactions    Bactrim [sulfamethoxazole-trimethoprim] Other (See Comments)     Causes renal failure    Nsaids (non-steroidal anti-inflammatory drug) Other (See Comments)     Renal failure       Family History     Problem Relation (Age of Onset)    Coronary artery disease Father    Diabetes Mother, Father, Maternal Grandmother    Hypertension Mother, Father    Kidney failure Mother    Lupus Sister        Social History Main Topics    Smoking status: Former Smoker     Packs/day: 0.50     Years: 10.00     Types: Cigarettes     Quit date: 1994    Smokeless tobacco: Never Used    Alcohol use No    Drug use: No    Sexual activity: Not on file      Review of Systems   Constitutional: Positive for fatigue. Negative for fever.   HENT: Negative for congestion and sore throat.    Eyes: Negative for visual disturbance.   Respiratory: Negative for cough (resolving ), shortness of breath and wheezing.    Cardiovascular: Negative for palpitations.   Gastrointestinal: Negative for diarrhea, nausea and vomiting.    Endocrine: Negative for cold intolerance and heat intolerance.   Genitourinary:        Anuric    Musculoskeletal: Positive for arthralgias and myalgias.   Skin: Negative for rash.   Neurological: Positive for dizziness. Negative for tremors and syncope.   Psychiatric/Behavioral: Negative.      Objective:     Vital Signs (Most Recent):  Temp: 97.8 °F (36.6 °C) (02/04/17 0731)  Pulse: 76 (02/04/17 1202)  Resp: 14 (02/04/17 1132)  BP: 90/66 (02/04/17 1202)  SpO2: 100 % (02/04/17 1132) Vital Signs (24h Range):  Temp:  [97.8 °F (36.6 °C)] 97.8 °F (36.6 °C)  Pulse:  [57-76] 76  Resp:  [14-27] 14  SpO2:  [93 %-100 %] 100 %  BP: (68-96)/(44-66) 90/66   Weight: 120 kg (264 lb 8.8 oz)  Body mass index is 40.22 kg/(m^2).    No intake or output data in the 24 hours ending 02/04/17 1220    Physical Exam   Constitutional: She appears well-developed and well-nourished. No distress.   Obese body habitus   Skin: She is not diaphoretic.       Vents:     Lines/Drains/Airways     Drain                 Hemodialysis AV Graft Left upper arm -- days         Hemodialysis AV Graft Left upper arm -- days         Hemodialysis AV Fistula 11/27/16 0920 Left upper arm 69 days          Peripheral Intravenous Line                 Peripheral IV - Single Lumen 02/04/17 0753 Left Wrist less than 1 day         Peripheral IV - Single Lumen 02/04/17 0754 Left Hand less than 1 day              Significant Labs:    CBC/Anemia Profile:    Recent Labs  Lab 02/04/17  0804   WBC 8.38   HGB 12.3   HCT 39.5      *   RDW 15.2*        Chemistries:    Recent Labs  Lab 02/04/17  0804   *   K 4.8   CL 92*   CO2 24   BUN 31*   CREATININE 7.1*   CALCIUM 10.3   ALBUMIN 3.3*   PROT 8.0   BILITOT 0.7   ALKPHOS 132   ALT 14   AST 35   MG 2.4       All pertinent labs within the past 24 hours have been reviewed.    Significant Imaging: I have reviewed all pertinent imaging results/findings within the past 24 hours.    Assessment/Plan:     Neuro  Seizure  disorder  - on Keppra 500 bid at home , will continue.     Pulmonary  Pulmonary sarcoidosis  - hx of pulmonary sarcoidosis on home oxygen (4-5 L ), follows with .   - on prednisone 10 mg OD , will hold prednisone and start hydrocortisone due to hypotension   - Last PFTs 1/20/17 showed : Normal airflow. Moderate restriction. Diffusion capacity is moderately decreased.     Cardiac  * Hypotension  - patient with multiple admission for Hypotension  - Cortisol; 6/16 was 3.1 , patient on florinef 0.1 mg OD ( no diagnosis of AI and patient thinks that she still takes the Florinef )  - she is on chronic prednisone for sarcoidosis , currently on 10 mg OD   - on Midodrine 15 mg TTS before HD   - DDx : Hypotension , adrenal insufficiency vs vasovagal vs sepsis   - infectious cause less likely as patient afebrile , no leukocytosis, but cannot ruled out.  She received vancomycin and zosyn in ED. Will continue antibiotics until get negative culture   - lactic acid 3.6 , possible secondary to hypotension vs sepsis, will repeat.     Paroxysmal atrial fibrillation  - hx of tachy /mauro syndrome s/p pacemaker   - on coumadin at home , INR 1.6 , will hold coumadin in anticipation for procedure for clotted AV fistula.   - rate controlled     Chronic combined systolic and diastolic heart failure  - Last echo ([facility] 6/27/16)    1 - Left atrial appendage is single lobed with no visualized thrombus.     2 - Normal left ventricular systolic function (EF 60-65%).     3 - Mild tricuspid regurgitation.      Renal  ESRD on hemodialysis  - ESRD on HD TTS for almost a year   - due for HD today but AV fistula clotted   - nephrology consulted and recommend CRRT due to hypotension   - will place temporary trialysis catheter for CRRT and follow up with nephrology for possible US of AVG and vascular surgery evaluation   - possible intervention on Monday for clotted fistula    Anemia in ESRD (end-stage renal disease)  - H/H stable 12.3/39.5          Critical Care Medicine Daily Checklist:    A: Awake: RASS Goal/Actual Goal:  0  Actual:     B: Spontaneous Breathing Trial Performed?  NA    C: SAT & SBT Coordinated?  NA                   D: Delirium: CAM-ICU     E: Early Mobility Performed? Yes   F: Feeding Goal:    Status:     Current Diet Order   No orders of the defined types were placed in this encounter.      AS: Analgesia/Sedation Na    T: Thromboembolic Prophylaxis On coumadin , will hold for possible procedure   H: HOB > 300 Yes   U: Stress Ulcer Prophylaxis (if needed) Na    G: Glucose Control Na    B: Bowel Function     I: Indwelling Catheter (Lines & Yepez) Necessity AV fistula    D: De-escalation of Antimicrobials/Pharmacotherapies vanc and zosyn pending cultures     Plan for the day/ETD Admit to MICU , for CRRT     Code Status:  Family/Goals of Care: Full Code       Discussed with Dr. Soto.       -----------------------------------------------------  Gato Jansen MD  , PGY-3  [phone]

## 2017-02-04 NOTE — ED PROVIDER NOTES
"Encounter Date: 2/4/2017    SCRIBE #1 NOTE: I, Zarina Post, am scribing for, and in the presence of,  Dr. Monroy. I have scribed the following portions of the note - the Resident attestation and the EKG reading.       History     Chief Complaint   Patient presents with    Vascular Access Problem     states went to dialysis this am and was told that her shunt was "clogged up"/    Dizziness     near syncope while at dialysis/dizziness x several days     Review of patient's allergies indicates:   Allergen Reactions    Bactrim [sulfamethoxazole-trimethoprim] Other (See Comments)     Causes renal failure    Nsaids (non-steroidal anti-inflammatory drug) Other (See Comments)     Renal failure     HPI Comments: 56 y.o. F with h/o CHF with pacemaker, paroxysmal Afib on coumadin, sarcoidosis on 3-5L home 02, ESRD ([facility]) BIB EMS for dizziness and clotted vascular access. Pt felt at her baseline until yesterday evening, when she developed generalized weakness and fatigue. She also experienced about 20 minutes of midsternal chest pain that she describes as "pulsating" and nonradiating  She endorses associated worsening dyspnea and did have to titrate up her home 02, but this improved with resolution of her chest pain. Upon waking this morning she felt back to her baseline until she went to dialysis. They attempted to access her fistula twice and she was told it was clotted. She then developed some lightheadedness and when they rechecked her blood pressure it was low, and she was transported to the ED. Denies fever, chills, or cough.    The history is provided by the patient.     Past Medical History   Diagnosis Date    Anemia in ESRD (end-stage renal disease) 3/7/2016    Anticoagulant long-term use     Cervical radiculopathy 7/20/2015    CHF (congestive heart failure)     Chronic combined systolic and diastolic heart failure 6/14/2013     EF 20% 2013, improved with Medical therapy, negative PET 2013    Chronic " respiratory failure with hypoxia 2013     On home oxygen at 2-5 liters    Chronic rhinitis 10/2/2013    DDD (degenerative disc disease), lumbar 2015    ESRD on hemodialysis 2016    Essential hypertension     Gout     Hyperlipidemia 3/7/2016    Lateral meniscal tear 2016    Lumbar stenosis 2015    Morbid obesity 8/15/2013    Paroxysmal atrial fibrillation 2014     Not on anticoagulation    Peripheral neuropathy 2013    Personal history of fall 2014    Personal history of gastric ulcer 3/19/2013    Sarcoidosis, lung 2009     Diagnosed in  with ocular manifestation, on 4L home O2 and PO steroids    Secondary pulmonary hypertension 2015    Seizure disorder 3/19/2013    Seizures     Shingles 2013    Spondylarthrosis 2015    Thyroid disease      Past Medical History Pertinent Negatives   Diagnosis Date Noted    Asthma 2016    Cancer 2016    COPD (chronic obstructive pulmonary disease) 2016    Coronary artery disease 2016    Diabetes mellitus 2016    Difficult intubation 2017    Encounter for blood transfusion 2016    General anesthetics causing adverse effect in therapeutic use 2017    Hypotension, iatrogenic 2017    Malignant hyperthermia 2017    PONV (postoperative nausea and vomiting) 2017    Respiratory distress 2017    Stroke 2016    Transfusion reaction 2017     Past Surgical History   Procedure Laterality Date    Abdominal surgery      Vascular surgery       section       x2    Other surgical history       loop recorder implant    Loop recorder       Family History   Problem Relation Age of Onset    Kidney failure Mother     Hypertension Mother     Diabetes Mother     Coronary artery disease Father     Hypertension Father     Diabetes Father     Lupus Sister     Diabetes Maternal Grandmother     Colon cancer Neg Hx     Ovarian  cancer Neg Hx     Breast cancer Neg Hx     Melanoma Neg Hx      Social History   Substance Use Topics    Smoking status: Former Smoker     Packs/day: 0.50     Years: 10.00     Types: Cigarettes     Quit date: 4/22/1994    Smokeless tobacco: Never Used    Alcohol use No     Review of Systems   Constitutional: Positive for fatigue. Negative for chills and fever.   HENT: Negative for congestion and sore throat.    Respiratory: Negative for cough and shortness of breath.    Cardiovascular: Positive for chest pain. Negative for leg swelling.   Gastrointestinal: Negative for abdominal pain, nausea and vomiting.   Genitourinary: Positive for decreased urine volume. Negative for flank pain.   Musculoskeletal: Negative for back pain and gait problem.   Skin: Negative for rash.   Neurological: Positive for weakness (generalized). Negative for numbness.   Hematological: Does not bruise/bleed easily.       Physical Exam   Initial Vitals   BP Pulse Resp Temp SpO2   02/04/17 0731 02/04/17 0731 02/04/17 0731 02/04/17 0731 02/04/17 0731   79/44 57 16 97.8 °F (36.6 °C) 93 %     Physical Exam    Nursing note and vitals reviewed.  Constitutional: She appears well-developed and well-nourished. She is not diaphoretic. No distress.   HENT:   Head: Normocephalic and atraumatic.   Mouth/Throat: Oropharynx is clear and moist.   Eyes: Conjunctivae and EOM are normal. Pupils are equal, round, and reactive to light. No scleral icterus.   Neck: Normal range of motion. Neck supple. No tracheal deviation present.   Cardiovascular: Normal rate, normal heart sounds and intact distal pulses. Exam reveals no gallop and no friction rub.    No murmur heard.  Bradycardic on triage vitals, HR in 70s upon my evaluation. Irregularly irregular   Pulmonary/Chest: No respiratory distress. She has no wheezes. She has no rhonchi. She has rales (faint bibasilar rales).   Abdominal: Soft. Bowel sounds are normal. She exhibits no distension. There is no  tenderness. There is no rebound and no guarding.   Musculoskeletal:   Fistula to left upper arm with palpable thrill   Neurological: She is oriented to person, place, and time. She has normal strength. No cranial nerve deficit.   Skin: Skin is warm and dry.         ED Course   Central Line  Date/Time: 2/4/2017 1:35 PM  Location procedure was performed: [facility] EMERGENCY DEPARTMENT  Performed by: TOMMY RODNEY  Consent Done: Yes  Indications: hemodialysis  Anesthesia: local infiltration    Anesthesia:  Anesthesia: local infiltration  Local Anesthetic: lidocaine 1% without epinephrine   Anesthetic total: 4 mL  Preparation: skin prepped with ChloraPrep  Skin prep agent dried: skin prep agent completely dried prior to procedure  Sterile barriers: all five maximum sterile barriers used - cap, mask, sterile gown, sterile gloves, and large sterile sheet  Location details: right internal jugular  Catheter type: triple lumen  Catheter size: 13 Fr  Ultrasound guidance: yes  Vessel Caliber: large, patent, compressibility normal  Needle advanced into vessel with real time Ultrasound guidance.  Guidewire confirmed in vessel.  Sterile sheath used.  Manometry: No   Number of attempts: 1  Estimated blood loss (mL): 0  Post-procedure: line sutured,  chlorhexidine patch,  sterile dressing applied and blood return through all ports  Complications: No    Critical Care  Date/Time: 2/4/2017 6:40 PM  Performed by: TERESE WESLEY  Authorized by: LOLA HILLS   Direct patient critical care time: 30 minutes  Additional history critical care time: 5 minutes  Ordering / reviewing critical care time: 25 minutes  Documentation critical care time: 15 minutes  Consulting other physicians critical care time: 5 minutes  Total critical care time (exclusive of procedural time) : 80 minutes  Critical care was necessary to treat or prevent imminent or life-threatening deterioration of the following conditions: circulatory failure and  endocrine crisis.  Critical care was time spent personally by me on the following activities: evaluation of patient's response to treatment, obtaining history from patient or surrogate, examination of patient, ordering and performing treatments and interventions, ordering and review of radiographic studies, ordering and review of laboratory studies, pulse oximetry, review of old charts and re-evaluation of patient's condition.        Labs Reviewed   CBC W/ AUTO DIFFERENTIAL - Abnormal; Notable for the following:        Result Value    RBC 3.95 (*)      (*)     MCH 31.1 (*)     MCHC 31.1 (*)     RDW 15.2 (*)     Mono # 1.8 (*)     Mono% 21.7 (*)     All other components within normal limits   COMPREHENSIVE METABOLIC PANEL - Abnormal; Notable for the following:     Sodium 134 (*)     Chloride 92 (*)     Glucose 147 (*)     BUN, Bld 31 (*)     Creatinine 7.1 (*)     Albumin 3.3 (*)     Anion Gap 18 (*)     eGFR if  6.9 (*)     eGFR if non  5.9 (*)     All other components within normal limits   PROTIME-INR - Abnormal; Notable for the following:     Prothrombin Time 16.0 (*)     INR 1.6 (*)     All other components within normal limits   LACTIC ACID, PLASMA - Abnormal; Notable for the following:     Lactate (Lactic Acid) 3.6 (*)     All other components within normal limits   TROPONIN I - Abnormal; Notable for the following:     Troponin I 0.151 (*)     All other components within normal limits   B-TYPE NATRIURETIC PEPTIDE - Abnormal; Notable for the following:      (*)     All other components within normal limits   MAGNESIUM   B-TYPE NATRIURETIC PEPTIDE   PROTIME-INR   CORTISOL, RANDOM   CORTISOL, RANDOM    Narrative:     ADD ON PER DR AC ORDER [id number] CORTISOL @1123 2/4/17     EKG Readings: (Independently Interpreted)     HR 70. afib. Occasional paced rhythm noted.       X-Rays:   Independently Interpreted Readings:   Chest X-Ray: Cardiomegaly, pacemaker, mild  increase in pulmonary vasculature, no infiltrate     Medical Decision Making:   History:   Old Medical Records: I decided to obtain old medical records.  Clinical Tests:   Lab Tests: Reviewed and Ordered  Medical Tests: Reviewed and Ordered       APC / Resident Notes:   56 y.o. F with multiple medical comorbidities presents with episode of lightheadedness, hypotension, and clogged vascular access.  ddx includes ACS, PNA, bacteremia, electrolyte derangement, uremia, among others.  Pt hypotensive to 60/40s upon arrival, but this rapidly improved to 80/50s without intervention. Throughout pt remained well appearing with normal mentation, so pressors not initiated.  Labs obtained and significant for normal WBC, no anemia. Mild hyponatremia/hypochloremia that is stable. K nml, BUN and creat elevated around baseline. Lactate elevated at 3.6. Given lactic acidosis and vague symptoms, concern for occult bacteremia so broad spectrum with vanc/zosyn added. Case discussed with critical care who are also concerned for adrenal insufficiency, so cortisol level added and stress dose steroids given in ED. Nephrology consulted and plan to perform CRRT and recommend ICU admit.   Pt has been admitted to MICU for further care.   Maci Strickland MD    After discussion with MICU/nephrology, trialysis catheter placed to facilitate CRRT. Procedure performed by Dr. Quick per procedure note. CXR shows no PTX, central line in good position..  Maci Strickland MD       Scribe Attestation:   Scribe #1: I performed the above scribed service and the documentation accurately describes the services I performed. I attest to the accuracy of the note.    Attending Attestation:   Physician Attestation Statement for Resident:  As the supervising MD   Physician Attestation Statement: I have personally seen and examined this patient.   I agree with the above history. -: When she went to dialysis (S, T, R) she was informed that her access was clotted. Pt then  became weak with near syncopal event presenting with BP 79 systolic. She state her pressures are usually in 90 range . Pt became lightheaded and throught she might pass out.   As the supervising MD I agree with the above PE.   -: Irregular and tachycardic. Hx of aflutter and has a pacemaker. Alert and cooperative. No acute distress.   As the supervising MD I agree with the above treatment, course, plan, and disposition.   -: Will do labs, EKG, cardiac markers and watch BP. Pt may need a small amount of fluid.          Physician Attestation for Scribe:  Physician Attestation Statement for Scribe #1: I, Dr. Monroy, reviewed documentation, as scribed by Zarina Post in my presence, and it is both accurate and complete.         Attending ED Notes:   Patient presenting to the ED with symptoms of hypotension while at dialysis transferred here for further care and evaluation  Patient feeling lightheaded patient has a complicated past medi episodes similar to this patient is awake and alert with mild symptomatology patient evaluated with labs EKG there is a complicated endocrine problem here Required Solu-Cortef she also required placement of a dialysis catheter for further dialysis with her low blood pressure she is evaluated by critical care provider nephrology dialysis to the critical care team for further care upon admission her pressures had improved          ED Course     Clinical Impression:   The primary encounter diagnosis was ESRD on hemodialysis. Diagnoses of Chest pain, Hypotension, Renal failure, Other specified hypotension, Dialysis AV fistula malfunction, initial encounter, and Dialysis AV fistula malfunction, subsequent encounter were also pertinent to this visit.    Disposition:   Disposition: Admitted  Condition: Critical       Maci Strickland MD  Resident  02/04/17 8311       Kelvin Monroy MD  02/06/17 5527

## 2017-02-04 NOTE — PROGRESS NOTES
"Received report from tyler rene in ED, pt c/o hypotension, unable to received HD @ outside facility due to "clogged fistula", pt admitted to [facility] for dialysis, pt currently in afib/aflutter, intermittently paced, BIV ppm, anuric, current bp 89/61, 2lnc sats 100%, pt aao x's4, moves all 4 extremities, will await for pts arrival  "

## 2017-02-04 NOTE — ASSESSMENT & PLAN NOTE
- patient with multiple admission for Hypotension  - Cortisol; 6/16 was 3.1 , patient on florinef 0.1 mg OD ( no diagnosis of AI and patient thinks that she still takes the Florinef )  - she is on chronic prednisone for sarcoidosis , currently on 10 mg OD   - on Midodrine 15 mg TTS before HD   - DDx : Hypotension , adrenal insufficiency vs vasovagal vs sepsis   - infectious cause less likely as patient afebrile , no leukocytosis, but cannot ruled out.  She received vancomycin and zosyn in ED. Will continue antibiotics until get negative culture   - lactic acid 3.6 , possible secondary to hypotension vs sepsis, will repeat.

## 2017-02-04 NOTE — IP AVS SNAPSHOT
[facility]  [address]  [facility] [address]  Phone: [phone]           Patient Discharge Instructions     Our goal is to set you up for success. This packet includes information on your condition, medications, and your home care. It will help you to care for yourself so you don't get sicker and need to go back to the hospital.     Please ask your nurse if you have any questions.        There are many details to remember when preparing to leave the hospital. Here is what you will need to do:    1. Take your medicine. If you are prescribed medications, review your Medication List in the following pages. You may have new medications to  at the pharmacy and others that you'll need to stop taking. Review the instructions for how and when to take your medications. Talk with your doctor or nurses if you are unsure of what to do.     2. Go to your follow-up appointments. Specific follow-up information is listed in the following pages. Your may be contacted by a transition nurse or clinical provider about future appointments. Be sure we have all of the phone numbers to reach you, if needed. Please contact your provider's office if you are unable to make an appointment.     3. Watch for warning signs. Your doctor or nurse will give you detailed warning signs to watch for and when to call for assistance. These instructions may also include educational information about your condition. If you experience any of warning signs to your health, call your doctor.               [facility]  Unless otherwise directed by your provider, please contact [facility], our nurse care line that is available for 24/7 assistance.     [phone] (toll-free)    Registered nurses in the [facility] provide clinical advisement, health education, appointment booking, and other advisory services.                    ** Verify the list of medication(s) below is accurate and up  to date. Carry this with you in case of emergency. If your medications have changed, please notify your healthcare provider.             Medication List      START taking these medications        Additional Info                      clopidogrel 75 mg tablet   Commonly known as:  PLAVIX   Quantity:  30 tablet   Refills:  11   Dose:  75 mg    Last time this was given:  75 mg on 2/11/2017  1:01 PM   Instructions:  Take 1 tablet (75 mg total) by mouth once daily.     Begin Date    AM    Noon    PM    Bedtime       inulin-sorbitol 2 gram Chew   Refills:  0   Dose:  2 tablet    Instructions:  Take 2 tablets by mouth 2 (two) times daily.     Begin Date    AM    Noon    PM    Bedtime       polyethylene glycol 17 gram/dose powder   Commonly known as:  GLYCOLAX   Quantity:  510 g   Refills:  0   Dose:  17 g    Instructions:  Take 17 g by mouth once daily. Dissolve in juice.     Begin Date    AM    Noon    PM    Bedtime         CHANGE how you take these medications        Additional Info                      metoprolol tartrate 25 MG tablet   Commonly known as:  LOPRESSOR   Quantity:  60 tablet   Refills:  11   Dose:  12.5 mg   What changed:  how much to take    Last time this was given:  25 mg on 2/10/2017 12:13 PM   Instructions:  Take 0.5 tablets (12.5 mg total) by mouth 2 (two) times daily.     Begin Date    AM    Noon    PM    Bedtime       midodrine 5 MG Tab   Commonly known as:  PROAMATINE   Refills:  0   Dose:  15 mg   What changed:    - how to take this  - when to take this    Last time this was given:  15 mg on 2/11/2017  8:10 AM   Instructions:  Take 3 tablets (15 mg total) by mouth every Tues, Thurs, Sat.     Begin Date    AM    Noon    PM    Bedtime         CONTINUE taking these medications        Additional Info                      ACZONE 5 % topical gel   Refills:  2   Generic drug:  dapsone    Instructions:  Apply to face every morning     Begin Date    AM    Noon    PM    Bedtime       ammonium lactate 12 %  lotion   Commonly known as:  LAC-HYDRIN   Refills:  2    Instructions:  Apply topically as needed (to legs).     Begin Date    AM    Noon    PM    Bedtime       atorvastatin 80 MG tablet   Commonly known as:  LIPITOR   Quantity:  90 tablet   Refills:  1   Dose:  80 mg    Last time this was given:  80 mg on 2/10/2017  9:19 PM   Instructions:  Take 1 tablet (80 mg total) by mouth nightly.     Begin Date    AM    Noon    PM    Bedtime       econazole nitrate 1 % cream   Refills:  0      Begin Date    AM    Noon    PM    Bedtime       gabapentin 300 MG capsule   Commonly known as:  NEURONTIN   Quantity:  270 capsule   Refills:  0   Dose:  300 mg   Comments:  **Patient requests 90 days supply**    Last time this was given:  300 mg on 2/11/2017  2:19 PM   Instructions:  Take 1 capsule (300 mg total) by mouth 3 (three) times daily.     Begin Date    AM    Noon    PM    Bedtime       ketorolac 0.5% 0.5 % Drop   Commonly known as:  ACULAR   Refills:  3    Instructions:  instill one drop into both eyes every morning     Begin Date    AM    Noon    PM    Bedtime       lactulose 20 gram/30 mL Soln   Commonly known as:  CHRONULAC   Quantity:  450 mL   Refills:  1   Dose:  10 g    Instructions:  Take 15 mLs (10 g total) by mouth 3 (three) times daily as needed (for constipation).     Begin Date    AM    Noon    PM    Bedtime       levetiracetam 500 MG Tab   Commonly known as:  KEPPRA   Quantity:  60 tablet   Refills:  5   Dose:  500 mg    Last time this was given:  500 mg on 2/11/2017  1:00 PM   Instructions:  Take 1 tablet (500 mg total) by mouth 2 (two) times daily.     Begin Date    AM    Noon    PM    Bedtime       omeprazole 20 MG capsule   Commonly known as:  PRILOSEC   Quantity:  30 capsule   Refills:  5   Dose:  20 mg    Instructions:  Take 1 capsule (20 mg total) by mouth once daily.     Begin Date    AM    Noon    PM    Bedtime       prednisoLONE acetate 1 % Drps   Commonly known as:  PRED FORTE   Refills:  3     Instructions:  INSTILL ONE DROP INTO  LEFT EYE  TWICE A DAY     Begin Date    AM    Noon    PM    Bedtime       predniSONE 10 MG tablet   Commonly known as:  DELTASONE   Quantity:  60 tablet   Refills:  3    Last time this was given:  10 mg on 2/11/2017  1:00 PM   Instructions:  Takes one 10 mg tablet every morning     Begin Date    AM    Noon    PM    Bedtime       RENVELA 800 mg Tab   Quantity:  90 tablet   Refills:  0   Generic drug:  sevelamer carbonate    Last time this was given:  800 mg on 2/11/2017  1:00 PM   Instructions:  TAKE 1 TABLET BY MOUTH THREE TIMES DAILY WITH MEALS     Begin Date    AM    Noon    PM    Bedtime       tizanidine 4 mg Cap   Quantity:  60 capsule   Refills:  0   Dose:  1 capsule    Instructions:  Take 1 capsule by mouth 2 (two) times daily as needed.     Begin Date    AM    Noon    PM    Bedtime       warfarin 5 MG tablet   Commonly known as:  COUMADIN   Quantity:  189 tablet   Refills:  0   Comments:  **Patient requests 90 days supply**    Last time this was given:  5 mg on 2/10/2017  5:35 PM   Instructions:  TAKE 1 TABLET BY MOUTH EVERYDAY EXCEPT 1 AND 1/2 TABLETS ON MONDAY AND FRIDAY OR AS DIRECTED BY CLINIC     Begin Date    AM    Noon    PM    Bedtime         STOP taking these medications     amoxicillin-clavulanate 875-125mg 875-125 mg per tablet   Commonly known as:  AUGMENTIN       aspirin 81 MG Chew       fludrocortisone 0.1 mg Tab   Commonly known as:  FLORINEF            Where to Get Your Medications      These medications were sent to [facility] [address] [place] [place] [facility] [address] [facility]  [address] [facility] [address]     Phone:  [phone]     clopidogrel 75 mg tablet    polyethylene glycol 17 gram/dose powder         You can get these medications from any pharmacy     You don't need a prescription for these medications     inulin-sorbitol 2 gram Chew         Information about where to get these  medications is not yet available     ! Ask your nurse or doctor about these medications     atorvastatin 80 MG tablet    metoprolol tartrate 25 MG tablet    midodrine 5 MG Tab                  Please bring to all follow up appointments:    1. A copy of your discharge instructions.  2. All medicines you are currently taking in their original bottles.  3. Identification and insurance card.    Please arrive 15 minutes ahead of scheduled appointment time.    Please call 24 hours in advance if you must reschedule your appointment and/or time.        Your Scheduled Appointments     Feb 16, 2017  3:00 PM [facility]   Hospital Follow Up with TYRESE Duggan - [facility] Medicine ([facility])    [address]  [place] [id number] [phone]            Feb 17, 2017  2:40 PM [facility]   Neurology - Established Patient with MD Yosi Self III - Neurology ([facility] )    [address]  [place] [address]   [phone]            Mar 08, 2017  9:00 AM CST   Established Patient with MD Yosi Crane - Endo/Diab/Metab ([facility] )    [address]  [place] [address]   [phone]            Apr 28, 2017 12:30 PM CDT   EKG with EKG, APPT   [facility] - EKG ([facility] )    [address]  [place] [id number] [phone]            Apr 28, 2017  1:00 PM CDT   Pacemaker Tune Up with PACEMAKER, ICD   Yosi [facility]on - Arrhythmia ([facility] )    [address]  [place] [id number] [phone]              Follow-up Information     Follow up with Torrey Villafana MD In 1 month.    Specialty:  Vascular Surgery    Why:  with vas us    Contact information:    [address] [facility]  [facility] [id number] [phone]          Follow up with TYRESE Jon On 2/16/2017.    Specialty:  Internal Medicine    Why:  @ 3:00    Contact information:    Brii Calderon  [place]  [id number] [phone]          Schedule an appointment as soon as possible for a visit with Bladimir Adorno MD.    Specialties:  Cardiology, Electrophysiology    Why:  For discharge from hospital follow up    Contact information:    Brii Calderon  [facility] [id number] [phone]          Schedule an appointment as soon as possible for a visit with VINAY Harvey MD.    Specialty:  Pulmonary Disease    Why:  For discharge from hospital follow up    Contact information:    [address] CALDERON  [facility] [id number] [phone]          Schedule an appointment as soon as possible for a visit with [facility] ENDOCRINOLOGY.    Specialty:  Endocrinology    Why:  To establish care    Contact information:    Brii Jara amy  [facility] [id number] [phone]        Follow up with Torrey Villafana MD. Schedule an appointment as soon as possible for a visit in 1 week.    Specialty:  Vascular Surgery    Why:  For discharge from hospital follow up    Contact information:    [address] CAL[address]  [facility] [id number] [phone]          Follow up with Yosi Ricoamy Roberts Coumadin. Schedule an appointment as soon as possible for a visit in 3 days.    Specialty:  Cardiology    Why:  As previously planned    Contact information:    Brii Jara amy  [facility] [id number] [phone]    Additional information:    3rd floor      Referrals     Future Orders    Ambulatory Referral to Endocrinology         Discharge Instructions     Future Orders    Activity as tolerated     Call MD for:  difficulty breathing or increased cough     Call MD for:  increased confusion or weakness     Call MD for:  persistent dizziness, light-headedness, or visual disturbances     Call MD for:  persistent nausea and vomiting or diarrhea     Call MD for:  temperature >100.4     Diet Cardiac     Diet renal     VAS US Hemodialysis Access     Comments:    JAKE ANDRADE        Primary Diagnosis     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      Admission Information     Date & Time Provider Department CSN    2/4/2017  7:40 AM Litzy Whitaker MD [facility] [id number]      Care Providers     Provider Role Specialty Primary office phone    Litzy Whitaker MD Attending Provider Hospitalist [phone]    Hamida Desai MD Consulting Physician  Nephrology [phone]    Jeancarlos Bermudez MD Team Attending  Hospitalist [phone]    Litzy Whitaker MD Team Attending  Hospitalist [phone]      Your Vitals Were     BP Pulse Temp Resp Height Weight    84/48 (BP Location: Right arm, Patient Position: Lying, BP Method: Manual) 100 98.7 °F (37.1 °C) (Oral) 18 5' 8" (1.727 m) 118.4 kg (261 lb 0.4 oz)    Last Period SpO2 BMI          (LMP Unknown) 96% 39.69 kg/m2        Recent Lab Values        4/10/2012 8/22/2013 6/15/2015 10/15/2015 5/17/2016               3:00 PM 10:56 AM 11:06 AM  9:41 AM  9:46 AM       A1C 6.5 (H) 6.0 5.5 6.7 (H) 5.7               Pending Labs     Order Current Status    Blood culture Preliminary result    Blood culture Preliminary result      Allergies as of 2/11/2017        Reactions    Bactrim [Sulfamethoxazole-trimethoprim] Other (See Comments)    Causes renal failure    Nsaids (Non-steroidal Anti-inflammatory Drug) Other (See Comments)    Renal failure      Advance Directives     An advance directive is a document which, in the event you are no longer able to make decisions for yourself, tells your healthcare team what kind of treatment you do or do not want to receive, or who you would like to make those decisions for you.  If you do not currently have an advance directive, [facility] encourages you to create one.  For more information call:  [phone]), [phone] ([phone]),  or log on to [email or web address].        Smoking Cessation     If you would like to quit smoking:   You may be eligible for free services if you are a [place] resident and started " smoking cigarettes before September 1, 1988.  Call the Smoking Cessation Trust (SCT) toll free at (273) 244-8695 or (036) 068-1514.   Call 1-800-QUIT-NOW if you do not meet the above criteria.            Language Assistance Services     ATTENTION: Language assistance services are available, free of charge. Please call [phone].      ATENCIÓN: Si habla denisa, tiene a muniz disposición servicios gratuitos de asistencia lingüística. Llame al [phone].     CHÚ Ý: N?u b?n nói Ti?ng Vi?t, có các d?ch v? h? tr? ngôn ng? mi?n phí dành cho b?n. G?i s? [phone].        Heart Failure Education       Heart Failure: Being Active  You have a condition called heart failure. Being active doesnt mean that you have to wear yourself out. Even a little movement each day helps to strengthen your heart. If you cant get out to exercise, you can do simple stretching and strengthening exercises at home. These are good ways to keep you well-conditioned and prevent you and your heart from becoming excessively weak.    Ideas to get you started  · Add a little movement to things you do now. Walk to mail letters. Park your car at the far end of the parking lot and walk to the store. Walk up a flight of stairs instead of taking the elevator.  · Choose activities you enjoy. You might walk, swim, or ride an exercise bike. Things like gardening and washing the car count, too. Other possibilities include: washing dishes, walking the dog, walking around the mall, and doing aerobic activities with friends.  · Join a group exercise program at a [facility] system, a senior center, or a community center. Or look into a hospital cardiac rehabilitation program. Ask your doctor if you qualify.  Tips to keep you going  · Get up and get dressed each day. Go to a coffee shop and read a newspaper or go somewhere that you'll be in the presence of other active people. Youll feel more like being active.  · Make a plan. Choose one or more activities  that you enjoy and that you can easily do. Then plan to do at least one each day. You might write your plan on a calendar.  · Go with a friend or a group if you like company. This can help you feel supported and stay motivated, too.  · Plan social events that you enjoy. This will keep you mentally engaged as well as physically motivated to do things you find pleasure in.  For your safety  · Talk with your healthcare provider before starting an exercise program.  · Exercise indoors when its too hot or too cold outside, or when the air quality is poor. Try walking at a shopping mall.  · Wear socks and sturdy shoes to maintain your balance and prevent falls.  · Start slowly. Do a few minutes several times a day at first. Increase your time and speed little by little.  · Stop and rest whenever you feel tired or get short of breath.  · Dont push yourself on days when you dont feel well.  Date Last Reviewed: 3/20/2016  © 6971-3744 [employer]. [address] [place] [address]. All rights reserved. This information is not intended as a substitute for professional medical care. Always follow your healthcare professional's instructions.              Heart Failure: Evaluating Your Heart  You have a condition called heart failure. To evaluate your condition, your doctor will examine you, ask questions, and do some tests. Along with looking for signs of heart failure, the doctor looks for any other health problems that may have led to heart failure. The results of your evaluation will help your doctor form a treatment plan.  Health history and physical exam  Your visit will start with a health history. Tell the doctor about any symptoms youve noticed and about all medicines you take. Then youll have a physical exam. This includes listening to your heartbeat and breathing. Youll also be checked for swelling (edema) in your legs and neck. When you have fluid buildup or fluid in the lungs, it may be  called congestive heart failure.  Diagnosing heart failure     During an echocardiogram, sound waves bounce off the heart. These are converted into a picture on the screen.   The following may be done to help your doctor form a diagnosis:  · X-rays show the size and shape of your heart. These pictures can also show fluid in your lungs.  · An electrocardiogram (ECG or EKG) shows the pattern of your heartbeat. Small pads (electrodes) are placed on your chest, arms, and legs. Wires connect the pads to the ECG machine, which records your hearts electrical signals. This can give the doctor information about heart function.  · An echocardiogram uses ultrasound waves to show the structure and movement of your heart muscle. This shows how well the heart pumps. It also shows the thickness of the heart walls, and if the heart is enlarged. It is one of the most useful, non-invasive tests as it provides information about the heart's general function. This helps your doctor make treatment decisions.  · Lab tests evaluate small amounts of blood or urine for signs of problems. A BNP lab test can help diagnose and evaluate heart failure. BNP stands for B-type natriuretic peptide. The ventricles secrete more BNP when heart failure worsens. Lab tests can also provide information about metabolic dysfunction or heart dysfunction.  Your treatment plan  Based on the results of your evaluation and tests, your doctor will develop a treatment plan. This plan is designed to relieve some of your heart failure symptoms and help make you more comfortable. Your treatment plan may include:  · Medicine to help your heart work better and improve your quality of life  · Changes in what you eat and drink to help prevent fluid from backing up in your body  · Daily monitoring of your weight and heart failure symptoms to see how well your treatment plan is working  · Exercise to help you stay healthy  · Help with quitting smoking  · Emotional and  psychological support to help adjust to the changes  · Referrals to other specialists to make sure you are being treated comprehensively  Date Last Reviewed: 3/21/2016  © 0686-4452 [employer]. [address] [place] [address], [place] [address]. All rights reserved. This information is not intended as a substitute for professional medical care. Always follow your healthcare professional's instructions.              Heart Failure: Making Changes to Your Diet  You have a condition called heart failure. When you have heart failure, excess fluid is more likely to build up in your body because your heart isn't working well. This makes the heart work harder to pump blood. Fluid buildup causes symptoms such as shortness of breath and swelling (edema). This is often referred to as congestive heart failure or CHF. Controlling the amount of salt (sodium) you eat may help stop fluid from building up. Your doctor may also tell you to reduce the amount of fluid you drink.  Reading food labels    Your healthcare provider will tell you how much sodium you can eat each day. Read food labels to keep track. Keep in mind that certain foods are high in salt. These include canned, frozen, and processed foods. Check the amount of sodium in each serving. Watch out for high-sodium ingredients. These include MSG (monosodium glutamate), baking soda, and sodium phosphate.   Eating less salt  Give yourself time to get used to eating less salt. It may take a little while. Here are some tips to help:  · Take the saltshaker off the table. Replace it with salt-free herb mixes and spices.  · Eat fresh or plain frozen vegetables. These have much less salt than canned vegetables.  · Choose low-sodium snacks like sodium-free pretzels, crackers, or air-popped popcorn.  · Dont add salt to your food when youre cooking. Instead, season your foods with pepper, lemon, garlic, or onion.  · When you eat out, ask that your food be cooked without added  salt.  · Avoid eating fried foods as these often have a great deal of salt.  If youre told to limit fluids  You may need to limit how much fluid you have to help prevent swelling. This includes anything that is liquid at room temperature, such as ice cream and soup. If your doctor tells you to limit fluid, try these tips:  · Measure drinks in a measuring cup before you drink them. This will help you meet daily goals.  · Chill drinks to make them more refreshing.  · Suck on frozen lemon wedges to quench thirst.  · Only drink when youre thirsty.  · Chew sugarless gum or suck on hard candy to keep your mouth moist.  · Weigh yourself daily to know if your body's fluid content is rising.  My sodium goal  Your healthcare provider may give you a sodium goal to meet each day. This includes sodium found in food as well as salt that you add. My goal is to eat no more than ___________ mg of sodium per day.     When to call your doctor  Call your doctor right away if you have any symptoms of worsening heart failure. These can include:  · Sudden weight gain  · Increased swelling of your legs or ankles  · Trouble breathing when youre resting or at night  · Increase in the number of pillows you have to sleep on  · Chest pain, pressure, discomfort, or pain in the jaw, neck, or back   Date Last Reviewed: 3/21/2016  © 9815-2109 [employer]. [address] [place] [address], [place] [address]. All rights reserved. This information is not intended as a substitute for professional medical care. Always follow your healthcare professional's instructions.              Heart Failure: Medicines to Help Your Heart    You have a condition called heart failure (also known as congestive heart failure, or CHF). Your doctor will likely prescribe medicines for heart failure and any underlying health problems you have. Most heart failure patients take one or more types of medicinen. Your healthcare provider will work to find the combination  of medicines that works best for you.  Heart failure medicines  Here are the most common heart failure medicines:  · ACE inhibitors lower blood pressure and decrease strain on the heart. This makes it easier for the heart to pump. Angiotensin receptor blockers have similar effects. These are prescribed for some patients instead of ACE inhibitors.  · Beta-blockers relieve stress on the heart. They also improve symptoms. They may also improve the heart's pumping action over time.  · Diuretics (also called water pills) help rid your body of excess water. This can help rid your body of swelling (edema). Having less fluid to pump means your heart doesnt have to work as hard. Some diuretics make your body lose a mineral called potassium. Your doctor will tell you if you need to take supplements or eat more foods high in potassium.  · Digoxin helps your heart pump with more strength. This helps your heart pump more blood with each beat. So, more oxygen-rich blood travels to the rest of the body.  · Aldosterone antagonists help alter hormones and decrease strain on the heart.  · Hydralazine and nitrates are two separate medicines used together to treat heart failure. They may come in one combination pill. They lower blood pressure and decrease how hard the heart has to pump.  Medicines for related conditions  Controlling other heart problems helps keep heart failure under control, too. Depending on other heart problems you have, medicines may be prescribed to:  · Lower blood pressure (antihypertensives).  · Lower cholesterol levels (statins).  · Prevent blood clots (anticoagulants or aspirin).  · Keep the heartbeat steady (antiarrhythmics).  Date Last Reviewed: 3/5/2016  © 9756-0690 [employer] [address] [place] [address], [place] [address]. All rights reserved. This information is not intended as a substitute for professional medical care. Always follow your healthcare professional's instructions.               Heart Failure: Procedures That May Help    The heart is a muscle that pumps oxygen-rich blood to all parts of the body. When you have heart failure, the heart is not able to pump as well as it should. Blood and fluid may back up into the lungs (congestive heart failure), and some parts of the body dont get enough oxygen-rich blood to work normally. These problems lead to the symptoms of heart failure.     Certain procedures may help the heart pump better in some cases of heart failure. Some procedures are done to treat health problems that may have caused the heart failure such as coronary artery disease or heart rhythm problems. For more serious heart failure, other options are available.  Treating artery and valve problems  If you have coronary artery disease or valve disease, procedures may be done to improve blood flow. This helps the heart pump better, which can improve heart failure symptoms. First, your doctor may do a cardiac catheterization to help detect clogged blood vessels or valve damage. During this procedure, a  thin tube (catheter) in inserted into a blood vessel and guided to the heart. There a dye is injected and a special type of X-ray (angiogram) is taken of the blood vessels. Procedures to open a blocked artery or fix damaged valves can also be done using catheterization.  · Angioplasty uses a balloon-tipped instrument at the end of the catheter. The balloon is inflated to widen the narrowed artery. In many cases, a stent is expanded to further support the narrowed artery. A stent is a metal mesh tube.  · Valve surgery repairs or replacement of faulty valves can also be done during catheterization so blood can flow properly through the chambers of the heart.  Bypass surgery is another option to help treat blocked arteries. It uses a healthy blood vessel from elsewhere in the body. The healthy blood vessel is attached above and below the blocked area so that blood can flow around the blocked  artery.  Treating heart rhythm problems  A device may be placed in the chest to help a weak heart maintain a healthy, heartbeat so the heart can pump more effectively:  · Pacemaker. A pacemaker is an implanted device that regulates your heartbeat electronically. It monitors your heart's rhythm and generates a painless electric impulse that helps the heart beat in a regular rhythm. A pacemaker is programmed to meet your specific heart rhythm needs.  · Biventricular pacing/cardiac resynchronization therapy. A type of pacemaker that paces both pumping chambers of the heart at the same time to coordinate contractions and to improve the heart's function. Some people with heart failure are candidates for this therapy.  · Implantable cardioverter defibrillator. A device similar to a pacemaker that senses when the heart is beating too fast and delivers an electrical shock to convert the fast rhythm to a normal rhythm. This can be a life saving device.  In severe cases  In more serious cases of heart failure when other treatments no longer work, other options may include:  · Ventricular assist devices (VADs). These are mechanical devices used to take over the pumping function for one or both of the heart's ventricles, or pumping chambers. A VAD may be necessary when heart failure progresses to the point that medicines and other treatments no longer help. In some cases, a VAD may be used as a bridge to transplant.  · Heart transplant. This is replacing the diseased heart with a healthy one from a donor. This is an option for a few people who are very sick. A heart transplant is very serious and not an option for all patients. Your doctor can tell you more.  Date Last Reviewed: 3/20/2016  © 8838-4367 [facility], [employer]. [address] [place] [address], [place] [address]. All rights reserved. This information is not intended as a substitute for professional medical care. Always follow your healthcare professional's  instructions.              Heart Failure: Tracking Your Weight  You have a condition called heart failure. When you have heart failure, a sudden weight gain or a steady rise in weight is a warning sign that your body is retaining too much water and salt. This could mean your heart failure is getting worse. If left untreated, it can cause problems for your lungs and result in shortness of breath. Weighing yourself each day is the best way to know if youre retaining water. If your weight goes up quickly, call your doctor. You will be given instructions on how to get rid of the excess water. You will likely need medicines and to avoid salt. This will help your heart work better.  Call your doctor if you gain more than 2 pounds in 1 day, more than 5 pounds in 1 week, or whatever weight gain you were told to report by your doctor. This is often a sign of worsening heart failure and needs to be evaluated and treated. Your doctor will tell you what to do next.   Tips for weighing yourself    · Weigh yourself at the same time each morning, wearing the same clothes. Weigh yourself after urinating and before eating.  · Use the same scale each day. Make sure the numbers are easy to read. Put the scale on a flat, hard surface -- not on a rug or carpet.  · Do not stop weighing yourself. If you forget one day, weigh again the next morning.  How to use your weight chart  · Keep your weight chart near the scale. Write your weight on the chart as soon as you get off the scale.  · Fill in the month and the start date on the chart. Then write down your weight each day. Your chart will look like this:    · If you miss a day, leave the space blank. Weigh yourself the next day and write your weight in the next space.  · Take your weight chart with you when you go to see your doctor.  Date Last Reviewed: 3/20/2016  © 5806-0914 [employer]. [address] [place] [address], [place] [address]. All rights reserved. This information is  not intended as a substitute for professional medical care. Always follow your healthcare professional's instructions.              Heart Failure: Warning Signs of a Flare-Up  You have a condition called heart failure. Once you have heart failure, flare-ups can happen. Below are signs that can mean your heart failure is getting worse. If you notice any of these warning signs, call your healthcare provider.  Swelling    · Your feet, ankles, or lower legs get puffier.  · You notice skin changes on your lower legs.  · Your shoes feel too tight.  · Your clothes are tighter in the waist.  · You have trouble getting rings on or off your fingers.  Shortness of breath  · You have to breathe harder even when youre doing your normal activities or when youre resting.  · You are short of breath walking up stairs or even short distances.  · You wake up at night short of breath or coughing.  · You need to use more pillows or sit up to sleep.  · You wake up tired or restless.  Other warning signs  · You feel weaker, dizzy, or more tired.  · You have chest pain or changes in your heartbeat.  · You have a cough that wont go away.  · You cant remember things or dont feel like eating.  Tracking your weight  Gaining weight is often the first warning sign that heart failure is getting worse. Gaining even a few pounds can be a sign that your body is retaining excess water and salt. Weighing yourself each day in the morning after you urinate and before you eat, is the best way to know if you're retaining water. Get a scale that is easy to read and make sure you wear the same clothes and use the same scale every time you weigh. Your healthcare provider will show you how to track your weight. Call your doctor if you gain more than 2 pounds in 1 day, 5 pounds in 1 week, or whatever weight gain you were told to report by your doctor. This is often a sign of worsening heart failure and needs to be evaluated and treated before it  compromises your breathing. Your doctor will tell you what to do next.    Date Last Reviewed: 3/15/2016  © 9267-2204 [employer]. [address] [place] [address], [place] [address]. All rights reserved. This information is not intended as a substitute for professional medical care. Always follow your healthcare professional's instructions.              Pneumonmia Discharge Instructions                Coumadin Discharge Instructions                         Chronic Kindey Disease Education             [facility] Sign-Up     Activating your [facility] account is as easy as 1-2-3!     1) Visit [email or web address], select Sign Up Now, enter this activation code and your date of birth, then select Next.  [email or web address]  Expires: 2/26/2017  1:57 PM      2) Create a username and password to use when you visit [facility] in the future and select a security question in case you lose your password and select Next.    3) Enter your e-mail address and click Sign Up!    Additional Information  If you have questions, please e-mail [email or web address] or call [phone] to talk to our [facility] staff. Remember, [facility] is NOT to be used for urgent needs. For medical emergencies, dial 911.          [facility] complies with applicable Federal civil rights laws and does not discriminate on the basis of race, color, national origin, age, disability, or sex.

## 2017-02-04 NOTE — ED TRIAGE NOTES
Pt to ER with c/o dizziness started this morning and c/o left upper arm dialysis port clogged. Pt normally received dialysis T-TH-SA

## 2017-02-04 NOTE — ED NOTES
Spoke with Dr. Bermeo regarding patient's BP of 78/50. Patient oriented and denies feeling dizzy or lightheaded. Complaints of being tired. No new orders at this time.

## 2017-02-04 NOTE — ASSESSMENT & PLAN NOTE
- hx of pulmonary sarcoidosis on home oxygen (4-5 L ), follows with .   - on prednisone 10 mg OD , will hold prednisone and start hydrocortisone due to hypotension   - Last PFTs 1/20/17 showed : Normal airflow. Moderate restriction. Diffusion capacity is moderately decreased.

## 2017-02-05 LAB
ALBUMIN SERPL BCP-MCNC: 3.1 G/DL
ALP SERPL-CCNC: 126 U/L
ALT SERPL W/O P-5'-P-CCNC: 13 U/L
ANION GAP SERPL CALC-SCNC: 11 MMOL/L
ANION GAP SERPL CALC-SCNC: 11 MMOL/L
ANION GAP SERPL CALC-SCNC: 17 MMOL/L
ANION GAP SERPL CALC-SCNC: 7 MMOL/L
AST SERPL-CCNC: 19 U/L
BASOPHILS # BLD AUTO: 0.01 K/UL
BASOPHILS NFR BLD: 0.1 %
BILIRUB SERPL-MCNC: 0.6 MG/DL
BUN SERPL-MCNC: 24 MG/DL
BUN SERPL-MCNC: 24 MG/DL
BUN SERPL-MCNC: 40 MG/DL
BUN SERPL-MCNC: 7 MG/DL
CALCIUM SERPL-MCNC: 8.8 MG/DL
CALCIUM SERPL-MCNC: 8.9 MG/DL
CALCIUM SERPL-MCNC: 8.9 MG/DL
CALCIUM SERPL-MCNC: 9.4 MG/DL
CHLORIDE SERPL-SCNC: 105 MMOL/L
CHLORIDE SERPL-SCNC: 89 MMOL/L
CHLORIDE SERPL-SCNC: 99 MMOL/L
CHLORIDE SERPL-SCNC: 99 MMOL/L
CO2 SERPL-SCNC: 25 MMOL/L
CO2 SERPL-SCNC: 26 MMOL/L
CREAT SERPL-MCNC: 1.9 MG/DL
CREAT SERPL-MCNC: 5 MG/DL
CREAT SERPL-MCNC: 5 MG/DL
CREAT SERPL-MCNC: 8.3 MG/DL
DIFFERENTIAL METHOD: ABNORMAL
EOSINOPHIL # BLD AUTO: 0 K/UL
EOSINOPHIL NFR BLD: 0.1 %
ERYTHROCYTE [DISTWIDTH] IN BLOOD BY AUTOMATED COUNT: 14.8 %
EST. GFR  (AFRICAN AMERICAN): 10.5 ML/MIN/1.73 M^2
EST. GFR  (AFRICAN AMERICAN): 10.5 ML/MIN/1.73 M^2
EST. GFR  (AFRICAN AMERICAN): 33.7 ML/MIN/1.73 M^2
EST. GFR  (AFRICAN AMERICAN): 5.7 ML/MIN/1.73 M^2
EST. GFR  (NON AFRICAN AMERICAN): 29.3 ML/MIN/1.73 M^2
EST. GFR  (NON AFRICAN AMERICAN): 4.9 ML/MIN/1.73 M^2
EST. GFR  (NON AFRICAN AMERICAN): 9.1 ML/MIN/1.73 M^2
EST. GFR  (NON AFRICAN AMERICAN): 9.1 ML/MIN/1.73 M^2
GLUCOSE SERPL-MCNC: 148 MG/DL
GLUCOSE SERPL-MCNC: 148 MG/DL
GLUCOSE SERPL-MCNC: 157 MG/DL
GLUCOSE SERPL-MCNC: 197 MG/DL
HCT VFR BLD AUTO: 35.2 %
HGB BLD-MCNC: 11.1 G/DL
LACTATE SERPL-SCNC: 1.1 MMOL/L
LYMPHOCYTES # BLD AUTO: 1.4 K/UL
LYMPHOCYTES NFR BLD: 13.2 %
MAGNESIUM SERPL-MCNC: 1.8 MG/DL
MAGNESIUM SERPL-MCNC: 1.8 MG/DL
MAGNESIUM SERPL-MCNC: 2.1 MG/DL
MCH RBC QN AUTO: 30.9 PG
MCHC RBC AUTO-ENTMCNC: 31.5 %
MCV RBC AUTO: 98 FL
MONOCYTES # BLD AUTO: 0.8 K/UL
MONOCYTES NFR BLD: 7.4 %
NEUTROPHILS # BLD AUTO: 8.4 K/UL
NEUTROPHILS NFR BLD: 79 %
PHOSPHATE SERPL-MCNC: 1.9 MG/DL
PHOSPHATE SERPL-MCNC: 2.6 MG/DL
PHOSPHATE SERPL-MCNC: 2.6 MG/DL
PHOSPHATE SERPL-MCNC: 4.3 MG/DL
PLATELET # BLD AUTO: 219 K/UL
PMV BLD AUTO: 10.8 FL
POTASSIUM SERPL-SCNC: 4.7 MMOL/L
POTASSIUM SERPL-SCNC: 4.8 MMOL/L
PROT SERPL-MCNC: 7.2 G/DL
RBC # BLD AUTO: 3.59 M/UL
SODIUM SERPL-SCNC: 131 MMOL/L
SODIUM SERPL-SCNC: 136 MMOL/L
SODIUM SERPL-SCNC: 136 MMOL/L
SODIUM SERPL-SCNC: 138 MMOL/L
VANCOMYCIN SERPL-MCNC: 23.7 UG/ML
WBC # BLD AUTO: 10.61 K/UL

## 2017-02-05 PROCEDURE — 80053 COMPREHEN METABOLIC PANEL: CPT

## 2017-02-05 PROCEDURE — 25000003 PHARM REV CODE 250: Performed by: STUDENT IN AN ORGANIZED HEALTH CARE EDUCATION/TRAINING PROGRAM

## 2017-02-05 PROCEDURE — 11000001 HC ACUTE MED/SURG PRIVATE ROOM

## 2017-02-05 PROCEDURE — 83605 ASSAY OF LACTIC ACID: CPT

## 2017-02-05 PROCEDURE — 63600175 PHARM REV CODE 636 W HCPCS: Performed by: STUDENT IN AN ORGANIZED HEALTH CARE EDUCATION/TRAINING PROGRAM

## 2017-02-05 PROCEDURE — 25000003 PHARM REV CODE 250: Performed by: NURSE PRACTITIONER

## 2017-02-05 PROCEDURE — 90945 DIALYSIS ONE EVALUATION: CPT | Mod: ,,, | Performed by: INTERNAL MEDICINE

## 2017-02-05 PROCEDURE — 84100 ASSAY OF PHOSPHORUS: CPT

## 2017-02-05 PROCEDURE — 99223 1ST HOSP IP/OBS HIGH 75: CPT | Mod: ,,, | Performed by: INTERNAL MEDICINE

## 2017-02-05 PROCEDURE — 83735 ASSAY OF MAGNESIUM: CPT | Mod: 91

## 2017-02-05 PROCEDURE — 80202 ASSAY OF VANCOMYCIN: CPT

## 2017-02-05 PROCEDURE — 63600175 PHARM REV CODE 636 W HCPCS

## 2017-02-05 PROCEDURE — 63600175 PHARM REV CODE 636 W HCPCS: Performed by: NURSE PRACTITIONER

## 2017-02-05 PROCEDURE — 85025 COMPLETE CBC W/AUTO DIFF WBC: CPT

## 2017-02-05 PROCEDURE — 80069 RENAL FUNCTION PANEL: CPT

## 2017-02-05 PROCEDURE — 83735 ASSAY OF MAGNESIUM: CPT

## 2017-02-05 PROCEDURE — 90945 DIALYSIS ONE EVALUATION: CPT

## 2017-02-05 RX ORDER — MIDODRINE HYDROCHLORIDE 5 MG/1
10 TABLET ORAL
Status: DISCONTINUED | OUTPATIENT
Start: 2017-02-07 | End: 2017-02-06

## 2017-02-05 RX ORDER — PREDNISONE 20 MG/1
20 TABLET ORAL DAILY
Status: DISCONTINUED | OUTPATIENT
Start: 2017-02-05 | End: 2017-02-06

## 2017-02-05 RX ORDER — PREDNISONE 20 MG/1
TABLET ORAL
Status: COMPLETED
Start: 2017-02-05 | End: 2017-02-05

## 2017-02-05 RX ADMIN — PREDNISONE 20 MG: 20 TABLET ORAL at 11:02

## 2017-02-05 RX ADMIN — GABAPENTIN 300 MG: 300 CAPSULE ORAL at 01:02

## 2017-02-05 RX ADMIN — SODIUM CHLORIDE: 0.9 INJECTION, SOLUTION INTRAVENOUS at 05:02

## 2017-02-05 RX ADMIN — SEVELAMER CARBONATE 800 MG: 800 TABLET, FILM COATED ORAL at 12:02

## 2017-02-05 RX ADMIN — SODIUM CHLORIDE, PRESERVATIVE FREE 3 ML: 5 INJECTION INTRAVENOUS at 09:02

## 2017-02-05 RX ADMIN — SEVELAMER CARBONATE 800 MG: 800 TABLET, FILM COATED ORAL at 08:02

## 2017-02-05 RX ADMIN — SODIUM CHLORIDE, PRESERVATIVE FREE 3 ML: 5 INJECTION INTRAVENOUS at 05:02

## 2017-02-05 RX ADMIN — SODIUM CHLORIDE: 0.9 INJECTION, SOLUTION INTRAVENOUS at 12:02

## 2017-02-05 RX ADMIN — SODIUM CHLORIDE, PRESERVATIVE FREE 3 ML: 5 INJECTION INTRAVENOUS at 01:02

## 2017-02-05 RX ADMIN — GABAPENTIN 300 MG: 300 CAPSULE ORAL at 09:02

## 2017-02-05 RX ADMIN — LEVETIRACETAM 500 MG: 500 TABLET, FILM COATED ORAL at 09:02

## 2017-02-05 RX ADMIN — PIPERACILLIN SODIUM AND TAZOBACTAM SODIUM 4.5 G: 4; .5 INJECTION, POWDER, FOR SOLUTION INTRAVENOUS at 09:02

## 2017-02-05 RX ADMIN — ATORVASTATIN CALCIUM 80 MG: 20 TABLET, FILM COATED ORAL at 09:02

## 2017-02-05 RX ADMIN — LEVETIRACETAM 500 MG: 500 TABLET, FILM COATED ORAL at 08:02

## 2017-02-05 RX ADMIN — GABAPENTIN 300 MG: 300 CAPSULE ORAL at 05:02

## 2017-02-05 RX ADMIN — MIDODRINE HYDROCHLORIDE 10 MG: 5 TABLET ORAL at 01:02

## 2017-02-05 RX ADMIN — MAGNESIUM SULFATE IN WATER 2 G: 40 INJECTION, SOLUTION INTRAVENOUS at 01:02

## 2017-02-05 RX ADMIN — MIDODRINE HYDROCHLORIDE 10 MG: 5 TABLET ORAL at 05:02

## 2017-02-05 RX ADMIN — SEVELAMER CARBONATE 800 MG: 800 TABLET, FILM COATED ORAL at 06:02

## 2017-02-05 RX ADMIN — HYDROCORTISONE SODIUM SUCCINATE 100 MG: 100 INJECTION, POWDER, FOR SOLUTION INTRAMUSCULAR; INTRAVENOUS at 05:02

## 2017-02-05 RX ADMIN — PIPERACILLIN SODIUM AND TAZOBACTAM SODIUM 4.5 G: 4; .5 INJECTION, POWDER, FOR SOLUTION INTRAVENOUS at 02:02

## 2017-02-05 RX ADMIN — SODIUM PHOSPHATE, MONOBASIC, MONOHYDRATE 20.01 MMOL: 276; 142 INJECTION, SOLUTION INTRAVENOUS at 05:02

## 2017-02-05 NOTE — PLAN OF CARE
PLAN OF CARE: pt involved in plan of care, CRRT tonight, maintain sbp >90, pt in bed watching tv, family @ bs, pt waiting for dinner tray, current bp 111/74, pt denies any SOB, dizziness, or chest pain, pt on 2LNC, sats 100%

## 2017-02-05 NOTE — CONSULTS
Consult Note  Vascular Surgery    Consult Requested By: Hospital Medicine  Reason for Consult: Thrombosed AVF     SUBJECTIVE:     History of Present Illness:  Patient is a 56 y.o.  female with a PMHx of HTN, CHF, COPD on home O2, Paroxysmal AFib s/p BiV Pacemaker, sarcoidosis on Prednisone, and ESRD on HD via LUE AVG who presents with a thrombosed LUE AVG. Patient went to HD on Saturday morning and could not be dialyzed bc of her occluded AV access. Last full HD was Thursday. She did not undergo attempted declot at her access center. She presented to the ED on 2/4/17 w generalized fatigue and evidence of fluid overload. Patient was admitted to medicine and vascular is being consulted for eval and tx of her AVG    10/2016 (Broderick): PTA x3 (7x40, 8x20, 9x20)   6/2016 (Matty): Perc Declot w PTA outflow stenosis (7x40) & TDC  2/2016: LUE Loop AVG (4-7 Taper)     iHD TTS via LUE AVF at [facility]    Scheduled Meds:   atorvastatin  80 mg Oral Nightly    gabapentin  300 mg Oral TID    hydrocortisone sodium succinate  100 mg Intravenous Q8H    levetiracetam  500 mg Oral BID    midodrine  10 mg Oral TID    piperacillin-tazobactam 4.5 g in dextrose 5 % 100 mL IVPB (ready to mix system)  4.5 g Intravenous Q8H    sevelamer carbonate  800 mg Oral TID WM    sodium chloride 0.9%  3 mL Intravenous Q8H    vancomycin (VANCOCIN) IVPB  15 mg/kg Intravenous Q24H     Continuous Infusions:   PRN Meds:sodium chloride 0.9%, magnesium sulfate IVPB, sodium phosphate IVPB, sodium phosphate IVPB, sodium phosphate IVPB    Review of patient's allergies indicates:   Allergen Reactions    Bactrim [sulfamethoxazole-trimethoprim] Other (See Comments)     Causes renal failure    Nsaids (non-steroidal anti-inflammatory drug) Other (See Comments)     Renal failure       Past Medical History   Diagnosis Date    Anemia in ESRD (end-stage renal disease) 3/7/2016    Anticoagulant long-term use     Cervical  radiculopathy 2015    CHF (congestive heart failure)     Chronic combined systolic and diastolic heart failure 2013     EF 20% , improved with Medical therapy, negative PET     Chronic respiratory failure with hypoxia 2013     On home oxygen at 2-5 liters    Chronic rhinitis 10/2/2013    DDD (degenerative disc disease), lumbar 2015    ESRD on hemodialysis 2016    Essential hypertension     Gout     Hyperlipidemia 3/7/2016    Lateral meniscal tear 2016    Lumbar stenosis 2015    Morbid obesity 8/15/2013    Paroxysmal atrial fibrillation 2014     Not on anticoagulation    Peripheral neuropathy 2013    Personal history of fall 2014    Personal history of gastric ulcer 3/19/2013    Sarcoidosis, lung 2009     Diagnosed in  with ocular manifestation, on 4L home O2 and PO steroids    Secondary pulmonary hypertension 2015    Seizure disorder 3/19/2013    Seizures     Shingles 2013    Spondylarthrosis 2015    Thyroid disease      Past Surgical History   Procedure Laterality Date    Abdominal surgery      Vascular surgery       section       x2    Other surgical history       loop recorder implant    Loop recorder       Family History   Problem Relation Age of Onset    Kidney failure Mother     Hypertension Mother     Diabetes Mother     Coronary artery disease Father     Hypertension Father     Diabetes Father     Lupus Sister     Diabetes Maternal Grandmother     Colon cancer Neg Hx     Ovarian cancer Neg Hx     Breast cancer Neg Hx     Melanoma Neg Hx      Social History   Substance Use Topics    Smoking status: Former Smoker     Packs/day: 0.50     Years: 10.00     Types: Cigarettes     Quit date: 1994    Smokeless tobacco: Never Used    Alcohol use No        Review of Systems:  Peripheral Vascular: denies rest pain  Constitutional: no fever or chills  Eyes: no visual changes  Respiratory:  no cough or shortness of breath  Cardiovascular: no chest pain or palpitations  Gastrointestinal: no nausea or vomiting, tolerating diet  Genitourinary: no hematuria or dysuria, + ESRD   Hematologic/Lymphatic: no easy bruising or lymphadenopathy  Musculoskeletal: no arthralgias or myalgias, + thrombosed LUE AVG  Neurological: no seizures or tremors    OBJECTIVE:     Vital Signs (Most Recent)  Temp: 97.7 °F (36.5 °C) (02/05/17 0300)  Pulse: 98 (02/05/17 0930)  Resp: (!) 28 (02/05/17 0930)  BP: 109/66 (02/05/17 0930)  SpO2: 100 % (02/05/17 0930)    Vital Signs Range (Last 24H):  Temp:  [97.4 °F (36.3 °C)-97.8 °F (36.6 °C)]   Pulse:  []   Resp:  [11-33]   BP: ()/(50-86)   SpO2:  [95 %-100 %]     Physical Exam:  General: well developed, well nourished, appears stated age, no distress  Head: normocephalic, atraumatic  Eyes:  PERRL, EOMI  Lungs:  normal respiratory effort  Heart: + Afib  Extremities: thrombosed LUE AVG     Laboratory:  CBC:   Recent Labs  Lab 02/05/17  0407   WBC 10.61   RBC 3.59*   HGB 11.1*   HCT 35.2*        CMP:   Recent Labs  Lab 02/05/17  0407   *  148*   CALCIUM 8.9  8.9   ALBUMIN 3.1*  3.1*   PROT 7.2     136   K 4.7  4.7   CO2 26  26   CL 99  99   BUN 24*  24*   CREATININE 5.0*  5.0*   ALKPHOS 126   ALT 13   AST 19   BILITOT 0.6     Coagulation:   Recent Labs  Lab 02/04/17  0804   INR 1.6*       ASSESSMENT/PLAN:      56 y.o.  female with a PMHx of HTN, CHF, COPD on home O2, Paroxysmal AFib s/p BiV Pacemaker, sarcoidosis on Prednisone, and ESRD on HD via LUE AVG who presents with a thrombosed LUE AVG  - patient would benefit from AVG declot  - will plan for perc thrombectomy on Tuesday   - [place]PO on Monday at [facility]   - please dialyze in meantime (monday) to prevent issues with fluid overload prior to OR    Chani Castillo DO   Vascular Surgery Fellow  02/05/2017

## 2017-02-05 NOTE — ASSESSMENT & PLAN NOTE
- ESRD on HD TTS for almost a year   - started on CRRT overnight , tolerated well.   - nephrology is following

## 2017-02-05 NOTE — PROGRESS NOTES
Progress Note  Nephrology    Admit Date: 2/4/2017   LOS: 1 day     SUBJECTIVE:     Follow-up For: ESRD (TTS)  Interval Hx:  Transferred to ICU overnight for SLED.  BP has remained adequate and patient without complaints.  She denies c/o SOB and good metabolic clearance obtained.  AVG to RAISSA ++ bruit/thrill.      Scheduled Meds:   atorvastatin  80 mg Oral Nightly    gabapentin  300 mg Oral TID    levetiracetam  500 mg Oral BID    midodrine  10 mg Oral TID    predniSONE  20 mg Oral Daily    sevelamer carbonate  800 mg Oral TID WM    sodium chloride 0.9%  3 mL Intravenous Q8H     Continuous Infusions:   PRN Meds:    Review of patient's allergies indicates:   Allergen Reactions    Bactrim [sulfamethoxazole-trimethoprim] Other (See Comments)     Causes renal failure    Nsaids (non-steroidal anti-inflammatory drug) Other (See Comments)     Renal failure       Review of Systems  Constitutional: Positive for fatigue. Negative for chills and fever.   HENT: Negative for congestion and sore throat.   Respiratory: Negative for cough and shortness of breath.   Cardiovascular: negative for palpitations, leg swelling, chest pain  Gastrointestinal: Negative for abdominal pain, nausea and vomiting.   Musculoskeletal: Negative for back pain and gait problem.   Skin: Negative for rash.   Neurological: Positive for weakness (generalized). Negative for numbness.   Hematological: Does not bruise/bleed easily    OBJECTIVE:     Vital Signs (Most Recent)  Temp: 97.6 °F (36.4 °C) (02/05/17 1500)  Pulse: 109 (02/05/17 1500)  Resp: (!) 22 (02/05/17 1500)  BP: 103/69 (02/05/17 1500)  SpO2: 98 % (02/05/17 1500)    Vital Signs Range (Last 24H):  Temp:  [97.4 °F (36.3 °C)-97.8 °F (36.6 °C)]   Pulse:  []   Resp:  [11-33]   BP: ()/(56-86)   SpO2:  [94 %-100 %]     I & O (Last 24H):  Intake/Output Summary (Last 24 hours) at 02/05/17 1543  Last data filed at 02/05/17 1500   Gross per 24 hour   Intake          3720.33 ml   Output              4480 ml   Net          -759.67 ml     Physical Exam:  General:Obese AAF in NAD. AOx4   HEENT: Normocephalic, atraumatic, EOM's intact bilaterally, external inspection of ears and nose normal, moist mucous membranes, no oral ulcerations/lesions  Respiratory: Fine bibasilar crackles, other lobes clear. good respiratory effort, no increased work of breathing.  Cardiovacular: Irregular rate on monitor. Intermittent pacing.  Gastrointestinal: Soft, obese, BS x4.  Extremities: No edema to lower extremities    Laboratory:  CBC:   Recent Labs  Lab 02/05/17  0407   WBC 10.61   RBC 3.59*   HGB 11.1*   HCT 35.2*      MCV 98   MCH 30.9   MCHC 31.5*     BMP:   Recent Labs  Lab 02/05/17  1320   *      K 4.7      CO2 26   BUN 7   CREATININE 1.9*   CALCIUM 8.8   MG 1.8           ASSESSMENT/PLAN:     Patient is a 56 y.o. female with PmHX  ESRD on HD, HTN, pHTN on O2, sarcoidosis, paroxysmal AFIB, s/p recent (1/11/17) BiV pacemaker implanted in anticipation of AVN RFA who presents to the ED from her dialysis unit when the techs at the center were unable to cannulate her AVG due to clotting of the access    Plan:   ESRD (TTS)  -SLED initiated last night, achieved good metabolic clearance, net negative 1L  -BP stable throughout treatment  -AVG ++ bruit/thrill, not clotted  -will obtain US of AVG  -will discontinue SLED at this time  -was on florinef at home, consider restarting or consulting endocrine for management  -Ok from renal standpoint for transfer to floor  -recommend switching midodrine to 10 mg PO on TTS 30 minutes prior to hemodialysis      AYAN Joseph, [facility]-BC  Nephrology  Pager:  [phone]    ATTENDING PHYSICIAN ATTESTATION  I have personally interviewed and examined the patient. I thoroughly reviewed the demographic, clinical, laboratorial and imaging information available in medical records. I agree with the assessment and recommendations provided by IVETH Gallegos whomwas under my  supervision.     DIALYSIS NOTE  Patient evaluated while undergoing Slow Low Efficiency Dialysis (SLED) indicated for YADIEL and hemodynamic instability. No complications, tolerating session with current UFR. Will continue current support.

## 2017-02-05 NOTE — ASSESSMENT & PLAN NOTE
- hx of tachy /mauro syndrome s/p pacemaker   - on coumadin at home , INR 1.6 on admit , cont hold coumadin in anticipation for procedure for clotted AV fistula.   - rate controlled

## 2017-02-05 NOTE — PROGRESS NOTES
Spoke with Kathia from [facility] medicine to step down Ms Medel. Patient accepted.

## 2017-02-05 NOTE — ASSESSMENT & PLAN NOTE
- hx of pulmonary sarcoidosis on home oxygen (4-5 L ), follows with .   - on prednisone 10 mg OD , hold prednisone while on  hydrocortisone due to hypotension   - Last PFTs 1/20/17 showed : Normal airflow. Moderate restriction. Diffusion capacity is moderately decreased.

## 2017-02-05 NOTE — SUBJECTIVE & OBJECTIVE
Interval History/Significant Events: no acute events overnight , tolerated CRRT . BP improved     Review of Systems   Constitutional: Negative for chills and fever.   HENT: Negative for nosebleeds.    Eyes: Negative for visual disturbance.   Respiratory: Negative for cough, shortness of breath and wheezing.    Cardiovascular: Negative for chest pain.   Gastrointestinal: Positive for constipation. Negative for abdominal pain.   Genitourinary:        Anuric   Skin: Negative for rash.   Neurological: Negative for numbness and headaches.     Objective:     Vital Signs (Most Recent):  Temp: 97.7 °F (36.5 °C) (02/05/17 0300)  Pulse: 82 (02/05/17 0830)  Resp: (!) 21 (02/05/17 0830)  BP: 103/77 (02/05/17 0830)  SpO2: 100 % (02/05/17 0830) Vital Signs (24h Range):  Temp:  [97.4 °F (36.3 °C)-97.8 °F (36.6 °C)] 97.7 °F (36.5 °C)  Pulse:  [] 82  Resp:  [11-30] 21  SpO2:  [95 %-100 %] 100 %  BP: ()/(50-86) 103/77   Weight: 120 kg (264 lb 8.8 oz)  Body mass index is 40.22 kg/(m^2).      Intake/Output Summary (Last 24 hours) at 02/05/17 0852  Last data filed at 02/05/17 0800   Gross per 24 hour   Intake          2180.33 ml   Output             2062 ml   Net           118.33 ml       Physical Exam   Constitutional: She is oriented to person, place, and time. She appears well-developed and well-nourished. No distress.   HENT:   Head: Normocephalic and atraumatic.   Mouth/Throat: No oropharyngeal exudate.   Eyes: Pupils are equal, round, and reactive to light. No scleral icterus.   Neck: Normal range of motion. No JVD present.   Cardiovascular: Exam reveals no gallop and no friction rub.    No murmur heard.  Irregular irregular , tachy    Pulmonary/Chest: She has no wheezes.   Mild crackles on bases bilateral    Abdominal: Soft. Bowel sounds are normal. There is no tenderness.   Neurological: She is alert and oriented to person, place, and time.   Skin: Skin is warm and dry.       Vents:     Lines/Drains/Airways      Central Venous Catheter Line                 Hemodialysis Catheter 02/04/17 1315 right internal jugular less than 1 day          Drain                 Hemodialysis AV Graft Left upper arm -- days         Hemodialysis AV Graft Left upper arm -- days         Hemodialysis AV Fistula 11/27/16 0920 Left upper arm 69 days          Peripheral Intravenous Line                 Peripheral IV - Single Lumen 02/04/17 0753 Right Wrist 1 day         Peripheral IV - Single Lumen 02/04/17 0754 Right Hand 1 day              Significant Labs:    CBC/Anemia Profile:    Recent Labs  Lab 02/04/17  0804 02/05/17  0407   WBC 8.38 10.61   HGB 12.3 11.1*   HCT 39.5 35.2*    219   * 98   RDW 15.2* 14.8*        Chemistries:    Recent Labs  Lab 02/04/17  0804 02/04/17 2322 02/05/17 0407   * 131* 136  136   K 4.8 4.8 4.7  4.7   CL 92* 89* 99  99   CO2 24 25 26  26   BUN 31* 40* 24*  24*   CREATININE 7.1* 8.3* 5.0*  5.0*   CALCIUM 10.3 9.4 8.9  8.9   ALBUMIN 3.3* 3.1* 3.1*  3.1*   PROT 8.0  --  7.2   BILITOT 0.7  --  0.6   ALKPHOS 132  --  126   ALT 14  --  13   AST 35  --  19   MG 2.4 1.8 2.1   PHOS  --  4.3 2.6*  2.6*       All pertinent labs within the past 24 hours have been reviewed.    Significant Imaging:  I have reviewed all pertinent imaging results/findings within the past 24 hours.

## 2017-02-05 NOTE — PROGRESS NOTES
[facility]  Critical Care Medicine  Progress Note    Patient Name: Sisi Medel  MRN: [id number]  Admission Date: 2/4/2017  Hospital Length of Stay: 1 days  Code Status: Full Code  Attending Provider: Flakita Soto MD  Primary Care Provider: Carlos A Caputo MD   Principal Problem: Hypotension    Subjective:     HPI:  Ms. Sisi Medel is a 56 y.o. 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  states that she went for dialysis today for HD but her AV fistula clotted and unable to perform HD and went home. She is brought by her mother because she is feeling fatigue and generally weak. She states that no attempt to de-clot AV fistula at dialysis center. Patient reports that her BP always low but not sure why, she takes midodrine 15 mg before dialysis that she took this am. She you cough, SOB, fever, chills or diarrhea.   On my evaluation patient resting comfortable on 2 L NC, BP 91/56 , HR 78 in afib and not in distress.       Hospital/ICU Course:  2/4/17 Patient will be admitted to MICU for CRRT due to hypotension.   2/5/17 started on CRRT , tolerated well.  BP improved       Interval History/Significant Events: no acute events overnight , tolerated CRRT . BP improved     Review of Systems   Constitutional: Negative for chills and fever.   HENT: Negative for nosebleeds.    Eyes: Negative for visual disturbance.   Respiratory: Negative for cough, shortness of breath and wheezing.    Cardiovascular: Negative for chest pain.   Gastrointestinal: Positive for constipation. Negative for abdominal pain.   Genitourinary:        Anuric   Skin: Negative  for rash.   Neurological: Negative for numbness and headaches.     Objective:     Vital Signs (Most Recent):  Temp: 97.7 °F (36.5 °C) (02/05/17 0300)  Pulse: 82 (02/05/17 0830)  Resp: (!) 21 (02/05/17 0830)  BP: 103/77 (02/05/17 0830)  SpO2: 100 % (02/05/17 0830) Vital Signs (24h Range):  Temp:  [97.4 °F (36.3 °C)-97.8 °F (36.6 °C)] 97.7 °F (36.5 °C)  Pulse:  [] 82  Resp:  [11-30] 21  SpO2:  [95 %-100 %] 100 %  BP: ()/(50-86) 103/77   Weight: 120 kg (264 lb 8.8 oz)  Body mass index is 40.22 kg/(m^2).      Intake/Output Summary (Last 24 hours) at 02/05/17 0852  Last data filed at 02/05/17 0800   Gross per 24 hour   Intake          2180.33 ml   Output             2062 ml   Net           118.33 ml       Physical Exam   Constitutional: She is oriented to person, place, and time. She appears well-developed and well-nourished. No distress.   HENT:   Head: Normocephalic and atraumatic.   Mouth/Throat: No oropharyngeal exudate.   Eyes: Pupils are equal, round, and reactive to light. No scleral icterus.   Neck: Normal range of motion. No JVD present.   Cardiovascular: Exam reveals no gallop and no friction rub.    No murmur heard.  Irregular irregular , tachy    Pulmonary/Chest: She has no wheezes.   Mild crackles on bases bilateral    Abdominal: Soft. Bowel sounds are normal. There is no tenderness.   Neurological: She is alert and oriented to person, place, and time.   Skin: Skin is warm and dry.       Vents:     Lines/Drains/Airways     Central Venous Catheter Line                 Hemodialysis Catheter 02/04/17 1315 right internal jugular less than 1 day          Drain                 Hemodialysis AV Graft Left upper arm -- days         Hemodialysis AV Graft Left upper arm -- days         Hemodialysis AV Fistula 11/27/16 0920 Left upper arm 69 days          Peripheral Intravenous Line                 Peripheral IV - Single Lumen 02/04/17 0753 Right Wrist 1 day         Peripheral IV - Single Lumen 02/04/17 0754  Right Hand 1 day              Significant Labs:    CBC/Anemia Profile:    Recent Labs  Lab 02/04/17  0804 02/05/17  0407   WBC 8.38 10.61   HGB 12.3 11.1*   HCT 39.5 35.2*    219   * 98   RDW 15.2* 14.8*        Chemistries:    Recent Labs  Lab 02/04/17  0804 02/04/17  2322 02/05/17  0407   * 131* 136  136   K 4.8 4.8 4.7  4.7   CL 92* 89* 99  99   CO2 24 25 26  26   BUN 31* 40* 24*  24*   CREATININE 7.1* 8.3* 5.0*  5.0*   CALCIUM 10.3 9.4 8.9  8.9   ALBUMIN 3.3* 3.1* 3.1*  3.1*   PROT 8.0  --  7.2   BILITOT 0.7  --  0.6   ALKPHOS 132  --  126   ALT 14  --  13   AST 35  --  19   MG 2.4 1.8 2.1   PHOS  --  4.3 2.6*  2.6*       All pertinent labs within the past 24 hours have been reviewed.    Significant Imaging:  I have reviewed all pertinent imaging results/findings within the past 24 hours.    Assessment/Plan:     Neuro  Seizure disorder  - on Keppra 500 bid at home , will continue.     Pulmonary  Pulmonary sarcoidosis  - hx of pulmonary sarcoidosis on home oxygen (4-5 L ), follows with .   - on prednisone 10 mg OD , hold prednisone while on  hydrocortisone due to hypotension   - Last PFTs 1/20/17 showed : Normal airflow. Moderate restriction. Diffusion capacity is moderately decreased.     Cardiac  * Hypotension  - patient with multiple admission for Hypotension  - Cortisol; 6/16 was 3.1 , patient on florinef 0.1 mg OD ( no diagnosis of AI and patient thinks that she still takes the Florinef ), repeated cortisol 11   - she is on chronic prednisone for sarcoidosis , currently on 10 mg OD   - on Midodrine 15 mg TTS before HD   - started on Midodrine 10 mg TID   - DDx : Hypotension , adrenal insufficiency vs vasovagal vs sepsis   - infectious cause less likely as patient afebrile , no leukocytosis, but cannot ruled out.  She received vancomycin and zosyn in ED. Will continue antibiotics until get negative culture   - lactic acid 3.6 on admit     Paroxysmal atrial fibrillation  -  hx of tachy /mauro syndrome s/p pacemaker   - on coumadin at home , INR 1.6 on admit , cont hold coumadin in anticipation for procedure for clotted AV fistula.   - rate controlled     Chronic combined systolic and diastolic heart failure  - Last echo ([facility] 6/27/16)    1 - Left atrial appendage is single lobed with no visualized thrombus.     2 - Normal left ventricular systolic function (EF 60-65%).     3 - Mild tricuspid regurgitation.      Renal  ESRD on hemodialysis  - ESRD on HD TTS for almost a year   - started on CRRT overnight , tolerated well.   - nephrology is following       Anemia in ESRD (end-stage renal disease)  - H/H stable 11.1/35.2      Other  Dialysis AV fistula malfunction  - Vascular surgery consulted , will follow.      Critical Care Medicine Daily Checklist:    A: Awake: RASS Goal/Actual Goal:  0  Actual: Horton Agitation Sedation Scale (RASS): Alert and calm   B: Spontaneous Breathing Trial Performed?  na   C: SAT & SBT Coordinated?  na                  D: Delirium: CAM-ICU Overall CAM-ICU: Negative   E: Early Mobility Performed? Yes   F: Feeding Goal:    Status:     Current Diet Order   Procedures    Diet renal      AS: Analgesia/Sedation na   T: Thromboembolic Prophylaxis Hold for possible procedure on Monday   H: HOB > 300 Yes   U: Stress Ulcer Prophylaxis (if needed) Na    G: Glucose Control na   B: Bowel Function Stool Occurrence: 0   I: Indwelling Catheter (Lines & Yepez) Necessity RIJ trialysis,    D: De-escalation of Antimicrobials/Pharmacotherapies vanc and cefepime pending cx     Plan for the day/ETD   step down to the floor     Code Status:  Family/Goals of Care: Full Code           -----------------------------------------------------  Gato Jansen MD  , PGY-3  [phone]

## 2017-02-05 NOTE — ASSESSMENT & PLAN NOTE
- patient with multiple admission for Hypotension  - Cortisol; 6/16 was 3.1 , patient on florinef 0.1 mg OD ( no diagnosis of AI and patient thinks that she still takes the Florinef ), repeated cortisol 11   - she is on chronic prednisone for sarcoidosis , currently on 10 mg OD   - on Midodrine 15 mg TTS before HD   - started on Midodrine 10 mg TID   - DDx : Hypotension , adrenal insufficiency vs vasovagal vs sepsis   - infectious cause less likely as patient afebrile , no leukocytosis, but cannot ruled out.  She received vancomycin and zosyn in ED. Will continue antibiotics until get negative culture   - lactic acid 3.6 on admit

## 2017-02-05 NOTE — NURSING TRANSFER
Nursing Transfer Note      2/5/2017     Transfer To: 951     Transfer via wheelchair    Transfer with O2 and cardiac monitoring    Transported by Price Valladares, RN    Medicines sent: None    Chart send with patient: Yes, given to MSU     Notified: friend at bedside. Notified receiving nurse    Upon arrival to floor: patient oriented to room, call bell in reach, bed in lowest position

## 2017-02-06 ENCOUNTER — ANESTHESIA EVENT (OUTPATIENT)
Dept: SURGERY | Facility: HOSPITAL | Age: 57
DRG: 252 | End: 2017-02-06
Payer: MEDICARE

## 2017-02-06 PROBLEM — R73.02 IMPAIRED GLUCOSE TOLERANCE: Status: ACTIVE | Noted: 2017-02-06

## 2017-02-06 PROBLEM — E55.9 VITAMIN D INSUFFICIENCY: Status: ACTIVE | Noted: 2017-02-06

## 2017-02-06 LAB
ALBUMIN SERPL BCP-MCNC: 3 G/DL
ALP SERPL-CCNC: 118 U/L
ALT SERPL W/O P-5'-P-CCNC: 11 U/L
ANION GAP SERPL CALC-SCNC: 10 MMOL/L
ANISOCYTOSIS BLD QL SMEAR: SLIGHT
AST SERPL-CCNC: 15 U/L
BASOPHILS # BLD AUTO: 0.01 K/UL
BASOPHILS NFR BLD: 0.1 %
BILIRUB SERPL-MCNC: 0.5 MG/DL
BUN SERPL-MCNC: 21 MG/DL
CALCIUM SERPL-MCNC: 9.4 MG/DL
CHLORIDE SERPL-SCNC: 103 MMOL/L
CO2 SERPL-SCNC: 25 MMOL/L
CREAT SERPL-MCNC: 3.7 MG/DL
DIFFERENTIAL METHOD: ABNORMAL
EOSINOPHIL # BLD AUTO: 0 K/UL
EOSINOPHIL NFR BLD: 0.2 %
ERYTHROCYTE [DISTWIDTH] IN BLOOD BY AUTOMATED COUNT: 15.2 %
EST. GFR  (AFRICAN AMERICAN): 15.1 ML/MIN/1.73 M^2
EST. GFR  (NON AFRICAN AMERICAN): 13.1 ML/MIN/1.73 M^2
GLUCOSE SERPL-MCNC: 137 MG/DL
HCT VFR BLD AUTO: 33.3 %
HGB BLD-MCNC: 10.3 G/DL
INR PPP: 1.4
LYMPHOCYTES # BLD AUTO: 2.1 K/UL
LYMPHOCYTES NFR BLD: 20 %
MAGNESIUM SERPL-MCNC: 1.8 MG/DL
MCH RBC QN AUTO: 31.2 PG
MCHC RBC AUTO-ENTMCNC: 30.9 %
MCV RBC AUTO: 101 FL
MONOCYTES # BLD AUTO: 1.2 K/UL
MONOCYTES NFR BLD: 11.1 %
NEUTROPHILS # BLD AUTO: 7.2 K/UL
NEUTROPHILS NFR BLD: 68.6 %
PHOSPHATE SERPL-MCNC: 3.5 MG/DL
PLATELET # BLD AUTO: 206 K/UL
PLATELET BLD QL SMEAR: ABNORMAL
PMV BLD AUTO: 11.2 FL
POLYCHROMASIA BLD QL SMEAR: ABNORMAL
POTASSIUM SERPL-SCNC: 4.6 MMOL/L
PROT SERPL-MCNC: 6.8 G/DL
PROTHROMBIN TIME: 14 SEC
RBC # BLD AUTO: 3.3 M/UL
SODIUM SERPL-SCNC: 138 MMOL/L
WBC # BLD AUTO: 10.45 K/UL

## 2017-02-06 PROCEDURE — 99222 1ST HOSP IP/OBS MODERATE 55: CPT | Mod: ,,, | Performed by: INTERNAL MEDICINE

## 2017-02-06 PROCEDURE — 83735 ASSAY OF MAGNESIUM: CPT

## 2017-02-06 PROCEDURE — 99232 SBSQ HOSP IP/OBS MODERATE 35: CPT | Mod: ,,, | Performed by: INTERNAL MEDICINE

## 2017-02-06 PROCEDURE — 36415 COLL VENOUS BLD VENIPUNCTURE: CPT

## 2017-02-06 PROCEDURE — 25000003 PHARM REV CODE 250: Performed by: INTERNAL MEDICINE

## 2017-02-06 PROCEDURE — 25000003 PHARM REV CODE 250: Performed by: HOSPITALIST

## 2017-02-06 PROCEDURE — 85025 COMPLETE CBC W/AUTO DIFF WBC: CPT

## 2017-02-06 PROCEDURE — 63600175 PHARM REV CODE 636 W HCPCS: Performed by: STUDENT IN AN ORGANIZED HEALTH CARE EDUCATION/TRAINING PROGRAM

## 2017-02-06 PROCEDURE — 85610 PROTHROMBIN TIME: CPT

## 2017-02-06 PROCEDURE — 80053 COMPREHEN METABOLIC PANEL: CPT

## 2017-02-06 PROCEDURE — 11000001 HC ACUTE MED/SURG PRIVATE ROOM

## 2017-02-06 PROCEDURE — 80100016 HC MAINTENANCE HEMODIALYSIS

## 2017-02-06 PROCEDURE — 25000003 PHARM REV CODE 250: Performed by: STUDENT IN AN ORGANIZED HEALTH CARE EDUCATION/TRAINING PROGRAM

## 2017-02-06 PROCEDURE — 90935 HEMODIALYSIS ONE EVALUATION: CPT | Mod: ,,, | Performed by: INTERNAL MEDICINE

## 2017-02-06 PROCEDURE — 84100 ASSAY OF PHOSPHORUS: CPT

## 2017-02-06 RX ORDER — SODIUM CHLORIDE 9 MG/ML
INJECTION, SOLUTION INTRAVENOUS ONCE
Status: COMPLETED | OUTPATIENT
Start: 2017-02-06 | End: 2017-02-06

## 2017-02-06 RX ORDER — MIDODRINE HYDROCHLORIDE 5 MG/1
10 TABLET ORAL
Status: DISCONTINUED | OUTPATIENT
Start: 2017-02-06 | End: 2017-02-11

## 2017-02-06 RX ORDER — PREDNISONE 10 MG/1
10 TABLET ORAL DAILY
Status: DISCONTINUED | OUTPATIENT
Start: 2017-02-07 | End: 2017-02-06

## 2017-02-06 RX ORDER — PREDNISONE 20 MG/1
20 TABLET ORAL DAILY
Status: DISCONTINUED | OUTPATIENT
Start: 2017-02-07 | End: 2017-02-08

## 2017-02-06 RX ORDER — SODIUM CHLORIDE 9 MG/ML
INJECTION, SOLUTION INTRAVENOUS
Status: DISCONTINUED | OUTPATIENT
Start: 2017-02-06 | End: 2017-02-07

## 2017-02-06 RX ORDER — METOPROLOL TARTRATE 25 MG/1
25 TABLET, FILM COATED ORAL 2 TIMES DAILY
Status: DISCONTINUED | OUTPATIENT
Start: 2017-02-06 | End: 2017-02-11 | Stop reason: HOSPADM

## 2017-02-06 RX ADMIN — METOPROLOL TARTRATE 25 MG: 25 TABLET ORAL at 03:02

## 2017-02-06 RX ADMIN — LEVETIRACETAM 500 MG: 500 TABLET, FILM COATED ORAL at 08:02

## 2017-02-06 RX ADMIN — SODIUM CHLORIDE: 0.9 INJECTION, SOLUTION INTRAVENOUS at 09:02

## 2017-02-06 RX ADMIN — GABAPENTIN 300 MG: 300 CAPSULE ORAL at 09:02

## 2017-02-06 RX ADMIN — SODIUM CHLORIDE, PRESERVATIVE FREE 3 ML: 5 INJECTION INTRAVENOUS at 09:02

## 2017-02-06 RX ADMIN — SEVELAMER CARBONATE 800 MG: 800 TABLET, FILM COATED ORAL at 06:02

## 2017-02-06 RX ADMIN — GABAPENTIN 300 MG: 300 CAPSULE ORAL at 05:02

## 2017-02-06 RX ADMIN — MIDODRINE HYDROCHLORIDE 10 MG: 5 TABLET ORAL at 09:02

## 2017-02-06 RX ADMIN — GABAPENTIN 300 MG: 300 CAPSULE ORAL at 01:02

## 2017-02-06 RX ADMIN — SODIUM CHLORIDE, PRESERVATIVE FREE 3 ML: 5 INJECTION INTRAVENOUS at 02:02

## 2017-02-06 RX ADMIN — METOPROLOL TARTRATE 25 MG: 25 TABLET ORAL at 09:02

## 2017-02-06 RX ADMIN — SODIUM CHLORIDE, PRESERVATIVE FREE 3 ML: 5 INJECTION INTRAVENOUS at 05:02

## 2017-02-06 RX ADMIN — ATORVASTATIN CALCIUM 80 MG: 20 TABLET, FILM COATED ORAL at 09:02

## 2017-02-06 RX ADMIN — LEVETIRACETAM 500 MG: 500 TABLET, FILM COATED ORAL at 09:02

## 2017-02-06 RX ADMIN — SEVELAMER CARBONATE 800 MG: 800 TABLET, FILM COATED ORAL at 03:02

## 2017-02-06 RX ADMIN — SEVELAMER CARBONATE 800 MG: 800 TABLET, FILM COATED ORAL at 08:02

## 2017-02-06 RX ADMIN — PREDNISONE 20 MG: 20 TABLET ORAL at 08:02

## 2017-02-06 NOTE — PROGRESS NOTES
Report given to Malu RENTERIA  Patient completed 3.5 hour hemodialysis  4 L net fluid removed  CVC flushed, capped and taped  Patient left SLIM via wheelchair in no apparent distress

## 2017-02-06 NOTE — MEDICAL/APP STUDENT
"[facility]  Endocrinology  Consult Note    Consult Requested by: Jeancarlos Bermudez MD   Reason for admit: Hypotension    HISTORY OF PRESENT ILLNESS:  Pt is 55 y/o F w/ PMHx significant for pulmonary sarcoidosis, ESRD on HD, AFib, CHF, COPD on home O2, and impaired glc tolerance currently admitted for hypotension, weakness and thrombosed LUE AVG. Takes prednisone 10mg PO daily x 7 yrs since Sarcoidosis dx, Midrodrine 15mg PO prior to HD sessions (Tues, Thurs, Sat).   Pt currently undergoing HD. Reports feeling "better than before" but still weak. She reports weakness has been ongoing for "a very long time" but Friday was the worst she'd experienced. She reports fatigue was accompanied by blurry vision and inability to ambulate with her walker. Denies LOC, HA, SOB, CP, n/v.     Medications and/or Treatments Impacting Glycemic Control:  Immunotherapy:    Immunosuppressants     None        Steroids:   Hormones     Start     Stop Route Frequency Ordered    02/05/17 1045  predniSONE tablet 20 mg      -- Oral Daily 02/05/17 1040        Pressors:    Autonomic Drugs     Start     Stop Route Frequency Ordered    02/06/17 0930  midodrine tablet 10 mg      -- Oral Every Tues, Thurs, Sat 02/06/17 0926          Prescriptions Prior to Admission   Medication Sig Dispense Refill Last Dose    ACZONE 5 % topical gel Apply to face every morning  2 2/3/2017    ammonium lactate (LAC-HYDRIN) 12 % lotion Apply topically as needed (to legs).   2 2/3/2017    aspirin 81 MG Chew Take 81 mg by mouth every morning.   2/3/2017    atorvastatin (LIPITOR) 80 MG tablet Take 1 tablet (80 mg total) by mouth once daily. (Patient taking differently: Take 80 mg by mouth nightly. ) 90 tablet 1 2/3/2017    fludrocortisone (FLORINEF) 0.1 mg Tab    2/3/2017    gabapentin (NEURONTIN) 300 MG capsule Take 1 capsule (300 mg total) by mouth 3 (three) times daily. 270 capsule 0 2/3/2017    lactulose (CHRONULAC) 20 gram/30 mL Soln Take 15 " mLs (10 g total) by mouth 3 (three) times daily as needed (for constipation). 450 mL 1 2/3/2017    levetiracetam (KEPPRA) 500 MG Tab Take 1 tablet (500 mg total) by mouth 2 (two) times daily. 60 tablet 5 2/3/2017    metoprolol tartrate (LOPRESSOR) 25 MG tablet Take 1 tablet (25 mg total) by mouth 2 (two) times daily. 60 tablet 11 2/3/2017    midodrine (PROAMATINE) 5 MG Tab 15 mg.    2/3/2017    omeprazole (PRILOSEC) 20 MG capsule Take 1 capsule (20 mg total) by mouth once daily. 30 capsule 5 2/3/2017    prednisoLONE acetate (PRED FORTE) 1 % DrpS INSTILL ONE DROP INTO  LEFT EYE  TWICE A DAY  3 2/3/2017    predniSONE (DELTASONE) 10 MG tablet Takes one 10 mg tablet every morning 60 tablet 3 2/3/2017    RENVELA 800 mg Tab TAKE 1 TABLET BY MOUTH THREE TIMES DAILY WITH MEALS 90 tablet 0 2/3/2017    tizanidine 4 mg Cap Take 1 capsule by mouth 2 (two) times daily as needed. 60 capsule 0 2/3/2017    warfarin (COUMADIN) 5 MG tablet TAKE 1 TABLET BY MOUTH EVERYDAY EXCEPT 1 AND 1/2 TABLETS ON MONDAY AND FRIDAY OR AS DIRECTED BY CLINIC 189 tablet 0 2/3/2017    amoxicillin-clavulanate 875-125mg (AUGMENTIN) 875-125 mg per tablet        econazole nitrate 1 % cream        ketorolac 0.5% (ACULAR) 0.5 % Drop instill one drop into both eyes every morning  3 Taking       Current Facility-Administered Medications   Medication Dose Route Frequency Provider Last Rate Last Dose    0.9%  NaCl infusion   Intravenous PRN Mickey Jones MD        0.9%  NaCl infusion   Intravenous Once Mickey Jones  mL/hr at 02/06/17 0925      atorvastatin tablet 80 mg  80 mg Oral Nightly Gato Jansen MD   80 mg at 02/05/17 2118    gabapentin capsule 300 mg  300 mg Oral TID Gato Jansen MD   300 mg at 02/06/17 0526    levetiracetam tablet 500 mg  500 mg Oral BID Gato Jansen MD   500 mg at 02/06/17 0830    midodrine tablet 10 mg  10 mg Oral Every [employer] blank[employer]hip Sat Manuel E Gonzalez,  MD   10 mg at 02/06/17 0939    predniSONE tablet 20 mg  20 mg Oral Daily Inder Padilla MD   20 mg at 02/06/17 0829    sevelamer carbonate tablet 800 mg  800 mg Oral TID  Gato Jansen MD   800 mg at 02/06/17 0830    sodium chloride 0.9% flush 3 mL  3 mL Intravenous Q8H Gato Jansen MD   3 mL at 02/06/17 0527       No new subjective & objective note has been filed under this hospital service since the last note was generated.  .     ASSESSMENT and PLAN:    57 y/o F w/ PMHx significant for sarcoidosis, ESRD on HD, AFib, CHF, COPD on home O2, and impaired glc tolerance currently admitted for hypotension, weakness and thrombosed LUE AVG w/ suspected adrenal insufficiency    1. Hypotension  - VSS; asymptomatic at this time  - BP: ()/(37-92) 105/84 @ 1200  - NGTD on blood cx's; pt remains afebrile; D/C vanc and pip-katelyn  - Likely secondary type adrenal insufficiency 2/2 prolonged steroid use for tx of pulmonary Sarcoidosis      2. ESRD  - Continue HD 3x weekly  - BP: ()/(37-92) 105/84 @ 1200  - Anuric; monitor vitals and IV fluids in   - Midrodrine 10mg PO 30 min prior to HD     3. Sarcoidosis   - 7yr hx pulmonary sarcoidosis, on home O2 (4-5L), followed by Dr. Harvey  - Prednisone 20mg PO daily  - PFTs (1/20/17): normal airflow. Moderate restriction. Diffusion capacity moderately decreased.       DISCHARGE NEEDS: will assess daily    Kristen Jamil  Endocrinology  [facility]

## 2017-02-06 NOTE — NURSING
Patient arrived to the floor with RN via wheelchair. Patient oriented to the room, bed to the lowest position, call light in reach, non skid socks on. VSS. patient resting comfortably in bed.

## 2017-02-06 NOTE — NURSING
Dr. Bermudez notified of patient HR being tachycardic, a-fib rhythm, -130s, asymptomatic. Md ordered lopressor 25 mg, medication administered, Will continue to monitor.

## 2017-02-06 NOTE — PLAN OF CARE
Problem: Fall Risk (Adult)  Goal: Absence of Falls  Patient will demonstrate the desired outcomes by discharge/transition of care.   Outcome: Ongoing (interventions implemented as appropriate)  Patient remains free from any falls or injuries through the shift. Patient is ambulatory independently.     Problem: Patient Care Overview  Goal: Plan of Care Review  Outcome: Ongoing (interventions implemented as appropriate)  Patient remains AAO X 4. No complains of pain. Patient went for dialysis today, per dialysis nurse 3.5 hours dialyzed. Patient tachycardic 110-130s, MD notified and lopressor administered.     Problem: Pressure Ulcer Risk (Donis Scale) (Adult,Obstetrics,Pediatric)  Goal: Skin Integrity  Patient will demonstrate the desired outcomes by discharge/transition of care.   Outcome: Ongoing (interventions implemented as appropriate)  Patient able to position/reposition independently. Skin intact.

## 2017-02-06 NOTE — PROGRESS NOTES
[facility]  Consult Note Nephrology    Admit Date: 2/4/2017  Consult Requested By: Jeancarlos Bermudez MD   LOS: 2 days     SUBJECTIVE:     Follow-up For: ESRD on iHD    Interval History:    Review of Systems:  Reviewed over encounter  OBJECTIVE:     Vital Signs (Most Recent)  Temp: 97.5 °F (36.4 °C) (02/06/17 1521)  Pulse: 102 (02/06/17 1521)  Resp: 18 (02/06/17 1521)  BP: 110/69 (02/06/17 1521)  SpO2: 100 % (02/06/17 1521)    Vital Signs Range (Last 24H):  Temp:  [96.6 °F (35.9 °C)-98.5 °F (36.9 °C)]   Pulse:  []   Resp:  [18]   BP: ()/(37-96)   SpO2:  [97 %-100 %]       Intake/Output Summary (Last 24 hours) at 02/06/17 1624  Last data filed at 02/06/17 1255   Gross per 24 hour   Intake             1000 ml   Output             4601 ml   Net            -3601 ml         Physical Exam:   General: Well developed, well nourished in NAD  HEENT: Conjunctiva clear; Oropharynx clear  Neck: No JVD noted, Supple  CV- Normal S1, S2 with no murmurs,gallops,rubs  Resp- Lungs CTA Bilaterally, Unlabored  Abdomen- NTND, BS normoactive x4 quads, soft  Extrem- No cyanosis, clubbing, edema.  Skin- No rashes, lesions, ulcers  Neuro: awake, Oriented x3, no FND      Laboratory:  CBC:   Recent Labs  Lab 02/06/17  0415   WBC 10.45   RBC 3.30*   HGB 10.3*   HCT 33.3*      *   MCH 31.2*   MCHC 30.9*     BMP:   Recent Labs  Lab 02/06/17  0415   *      CO2 25   BUN 21*   CREATININE 3.7*   CALCIUM 9.4   MG 1.8     CMP:   Recent Labs  Lab 02/06/17  0415   *   CALCIUM 9.4   ALBUMIN 3.0*   PROT 6.8      K 4.6   CO2 25      BUN 21*   CREATININE 3.7*   ALKPHOS 118   ALT 11   AST 15   BILITOT 0.5     PTH: No results for input(s): PTH in the last 168 hours.  Coagulation:   Recent Labs  Lab 02/06/17  0415   INR 1.4*     Cardiac Markers: No results for input(s): CKMB, TROPONINT, MYOGLOBIN in the last 168 hours.  ABGs: No results for input(s): PH, PCO2, HCO3, POCSATURATED, BE in  the last 168 hours.    Labs reviewed  Diagnostic Results:  X-Ray: Reviewed  US: Reviewed  Echo: Reviewed    ASSESSMENT/PLAN:     Active Hospital Problems    Diagnosis  POA    *Hypotension [I95.9]  Yes    Vitamin D insufficiency [E55.9]  Unknown     Recommend vit d repletion      Impaired glucose tolerance [R73.02]  Unknown     poct glucose ac/hs and low dose SSI      Dialysis AV fistula malfunction [T82.590A]  Yes    Current chronic use of systemic steroids [Z79.52]  Not Applicable     Chronic    Anemia in ESRD (end-stage renal disease) [N18.6, D63.1]  Yes     Chronic    ESRD on hemodialysis [N18.6, Z99.2]  Not Applicable     Chronic    Secondary pulmonary hypertension [I27.2]  Yes     Chronic    Paroxysmal atrial fibrillation [I48.0]  Yes     Chronic    Morbid obesity with BMI of 40.0-44.9, adult [E66.01, Z68.41]  Not Applicable     Chronic    Chronic combined systolic and diastolic heart failure [I50.42]  Yes     Chronic     EF 20% 2013, improved with Medical therapy, negative PET 2013      Pulmonary sarcoidosis [D86.0]  Yes     Chronic    Seizure disorder [G40.909]  Yes     Chronic      Resolved Hospital Problems    Diagnosis Date Resolved POA   No resolved problems to display.       Patient is a 56 y.o. female with PmHX ESRD on HD, HTN, pHTN on O2, sarcoidosis, paroxysmal AFIB, s/p recent (1/11/17) BiV pacemaker implanted in anticipation of AVN RFA who presents to the ED from her dialysis unit when the techs at the center were unable to cannulate her AVG due to clotting of the access    ESRD on IHD TTS   On HD for:  Duration of outpatient dialysis session -   Residual Mary Function - no  - Will provide dialysis for metabolic clearance and volume   - Seen and examined today during hemodialysis; tolerating treatment well without issues. Denied headaches, chest pain, abdominal pain, or muscle cramps   -   - Target ultrafiltration 1-2 lts  - Dialysate adjusted to current labs   Aceess: has temp  cath also has RAISSA AVG with a very faint thrill    Active Problem in Hospital  Hypotension r/o AI  Vascular declott of AVG in am    Anemia of Chronic Kidney Disease   - Hg 10.3 st goal for ESRD  - Adequate iron stores as per most recent profile     Mineral Bone Disease in CKD   - Already on renal diet   - Already on binders as outpatient cont renvela 800 AC and snacks  - RFP every day for Phos monitoring    Hypotension  - Midodrine 10 mg prior HD and may need a second dose in the middle of HD  - consult endocrinology for suspected AI     Mickey Jones MD  Nephrology Fellow PGY4  [phone]      ATTENDING PHYSICIAN ATTESTATION  I have personally interviewed and examined the patient. I thoroughly reviewed the demographic, clinical, laboratorial and imaging information available in medical records. I agree with the assessment and recommendations provided by the subspecialty resident. Dr. Jones was under my supervision.     HEMODIALYSIS NOTE  Patient evaluated while undergoing hemodialysis indicated for ESRD. Tolerating session with current UFR, no complications.

## 2017-02-06 NOTE — PROGRESS NOTES
Received report from Malu RENTERIA  Patient arrived to [facility] via wheelchair and stood for weight  3 1/2 hour maintenance hemodialysis treatment started via right IJ trialysis CVC  Net fluid removal set for 4L

## 2017-02-06 NOTE — CONSULTS
[facility]  Endocrinology  Consult Note    Consult Requested by: Jeancarlos Bermudez MD   Reason for admit: Hypotension    HISTORY OF PRESENT ILLNESS:  Reason for Consult: Management of chronic steroids, hypotension          HPI:   Patient is a 56 y.o. female with a diagnosis of sarcoidosis, ESRD, atrial fib, impaired glucose tolerance. Presented to [facility] on 2/4/17 for clotted AV fistula and hypotension. Takes midodrine 15mg prior to dialysis sessions. Endocrinology consulted with concern for adrenal insufficiency. On chronic steroids (prednisone 10mg) per Dr. Harvey for sarcoidosis. Denies any recent illness, fever. Currently on pred 20mg. Had been previously prescribed fludrocortisone with adrenal insufficiency.        Medications and/or Treatments Impacting Glycemic Control:  Immunotherapy:    Immunosuppressants     None        Steroids:   Hormones     Start     Stop Route Frequency Ordered    02/05/17 1045  predniSONE tablet 20 mg      -- Oral Daily 02/05/17 1040        Pressors:    Autonomic Drugs     Start     Stop Route Frequency Ordered    02/06/17 0930  midodrine tablet 10 mg      -- Oral Every Tues, Thurs, Sat 02/06/17 0926          Prescriptions Prior to Admission   Medication Sig Dispense Refill Last Dose    ACZONE 5 % topical gel Apply to face every morning  2 2/3/2017    ammonium lactate (LAC-HYDRIN) 12 % lotion Apply topically as needed (to legs).   2 2/3/2017    aspirin 81 MG Chew Take 81 mg by mouth every morning.   2/3/2017    atorvastatin (LIPITOR) 80 MG tablet Take 1 tablet (80 mg total) by mouth once daily. (Patient taking differently: Take 80 mg by mouth nightly. ) 90 tablet 1 2/3/2017    fludrocortisone (FLORINEF) 0.1 mg Tab    2/3/2017    gabapentin (NEURONTIN) 300 MG capsule Take 1 capsule (300 mg total) by mouth 3 (three) times daily. 270 capsule 0 2/3/2017    lactulose (CHRONULAC) 20 gram/30 mL Soln Take 15 mLs (10 g total) by mouth 3 (three) times daily as needed  (for constipation). 450 mL 1 2/3/2017    levetiracetam (KEPPRA) 500 MG Tab Take 1 tablet (500 mg total) by mouth 2 (two) times daily. 60 tablet 5 2/3/2017    metoprolol tartrate (LOPRESSOR) 25 MG tablet Take 1 tablet (25 mg total) by mouth 2 (two) times daily. 60 tablet 11 2/3/2017    midodrine (PROAMATINE) 5 MG Tab 15 mg.    2/3/2017    omeprazole (PRILOSEC) 20 MG capsule Take 1 capsule (20 mg total) by mouth once daily. 30 capsule 5 2/3/2017    prednisoLONE acetate (PRED FORTE) 1 % DrpS INSTILL ONE DROP INTO  LEFT EYE  TWICE A DAY  3 2/3/2017    predniSONE (DELTASONE) 10 MG tablet Takes one 10 mg tablet every morning 60 tablet 3 2/3/2017    RENVELA 800 mg Tab TAKE 1 TABLET BY MOUTH THREE TIMES DAILY WITH MEALS 90 tablet 0 2/3/2017    tizanidine 4 mg Cap Take 1 capsule by mouth 2 (two) times daily as needed. 60 capsule 0 2/3/2017    warfarin (COUMADIN) 5 MG tablet TAKE 1 TABLET BY MOUTH EVERYDAY EXCEPT 1 AND 1/2 TABLETS ON MONDAY AND FRIDAY OR AS DIRECTED BY CLINIC 189 tablet 0 2/3/2017    amoxicillin-clavulanate 875-125mg (AUGMENTIN) 875-125 mg per tablet        econazole nitrate 1 % cream        ketorolac 0.5% (ACULAR) 0.5 % Drop instill one drop into both eyes every morning  3 Taking       Current Facility-Administered Medications   Medication Dose Route Frequency Provider Last Rate Last Dose    0.9%  NaCl infusion   Intravenous PRN Mickey Jones MD        0.9%  NaCl infusion   Intravenous Once Mickey Jones  mL/hr at 02/06/17 0925      atorvastatin tablet 80 mg  80 mg Oral Nightly Gato Jansen MD   80 mg at 02/05/17 2118    gabapentin capsule 300 mg  300 mg Oral TID Gato Jansen MD   300 mg at 02/06/17 0526    levetiracetam tablet 500 mg  500 mg Oral BID Gato Jansen MD   500 mg at 02/06/17 0830    midodrine tablet 10 mg  10 mg Oral Every Fran Wolfe, Pradeep Jones MD   10 mg at 02/06/17 0939    predniSONE tablet 20 mg   20 mg Oral Daily Inder Padilla MD   20 mg at 17 0829    sevelamer carbonate tablet 800 mg  800 mg Oral TID WM Gato Jansen MD   800 mg at 17 0830    sodium chloride 0.9% flush 3 mL  3 mL Intravenous Q8H Gato Jansen MD   3 mL at 17 0527       Interval HPI:   Overnight events: none  Eatin%  Nausea: No  Hypoglycemia and intervention: No  Fever: No  TPN and/or TF: No  If yes, type of TF/TPN and rate: n/a    PMH, PSH, FH, SH updated and reviewed     REVIEW OF SYSTEMS:  Constitutional: positive for fatigue, Negative for weight changes.  Eyes: Negative for visual disturbance.  Respiratory: Negative for cough.   Cardiovascular: Negative for chest pain.  Gastrointestinal: Negative for nausea  Endocrine: denies polyuria, polydipsia, nocturia.  Musculoskeletal: Negative for back pain.  Skin: Negative for rash.  Neurological: Negative for syncope.  Psychiatric/Behavioral: Negative for depression, anxiety.   All other systems reviewed and are negative.    Labs Reviewed and Include:      Recent Labs  Lab 17  0415   *   CALCIUM 9.4   ALBUMIN 3.0*   PROT 6.8      K 4.6   CO2 25      BUN 21*   CREATININE 3.7*   ALKPHOS 118   ALT 11   AST 15   BILITOT 0.5     Lab Results   Component Value Date    HGBA1C 5.7 2016       Nutritional status:   Body mass index is 40.22 kg/(m^2).  Lab Results   Component Value Date    ALBUMIN 3.0 (L) 2017    ALBUMIN 3.1 (L) 2017    ALBUMIN 3.1 (L) 2017    ALBUMIN 3.1 (L) 2017     Lab Results   Component Value Date    PREALBUMIN 15 (L) 2016       Estimated Creatinine Clearance: 23.4 mL/min (based on Cr of 3.7).      PHYSICAL EXAMINATION:  Vitals:    17 1100   BP: (!) 106/37   Pulse: 90   Resp:    Temp:      Body mass index is 40.22 kg/(m^2).    Constitutional: chronically ill appearing, obese  ENMT: External ears, nose with no mass or significant findings, Hearing  intact  Neck:  No tracheal deviation present, No neck masses noted.  No thyromegaly or thyroid tenderness.  Cardiovascular:  Irregular irregular No murmurs; no lower extremity edema bilaterally  Respiratory:  Normal effort, no use of accessory muscles, Normal breath sounds without adventitious sounds  Abdomen:  Soft, no masses, no tenderness, Bowel sounds are normal. No hernia noted  Skin:  Left av fistula, No rashes, lesions or ulcers, no induration or nodules  Psychiatric:  Judgement and insight good with normal mood and affect  Musculoskeletal:  no clubbing, cyanosis or petechiae.  Neurological: Cranial nerves are grossly intact.     .     ASSESSMENT and PLAN:    Current chronic use of systemic steroids  Primary team may consider resuming chronic prednisone 10mg inpatient without evidence of fever or acute illness  Unable to interpret 8am cortisol value while on chronic steroids  Extensive history of outpatient steroids, may discharge on prednisone 10mg from endocrine standpoint    Likely secondary adrenal insufficiency given chronic steroids and thus would not require florinef at discharge. May consider checking aldosterone and renin midweek with patient not having received florinef since last week      Pulmonary sarcoidosis  On chronic steroids      Morbid obesity with BMI of 40.0-44.9, adult  May increase insulin resistance      Paroxysmal atrial fibrillation  On coumadin      ESRD on hemodialysis  Dialysis per nephrology      Impaired glucose tolerance  poct ac/hs with low dose SSI          Cirilo Broussard MD  Endocrinology  [facility], Donna Trent MD,  have personally taken the history and examined the patient and agree with the resident's note as stated above.  On systemic steroids since late 40s   Unable to assess HPA axis while on steroids    If she needs steroids for sarcoid then would empirically continue but can increase dose to 20 from 10 and see if she feels better....  Would then plan an outpatient taper to a dose recommended by pulmonary as adrenal insufficiency due to suboptimal glucocorticoid dose while on glucocorticoids is a clinical diagnosis     If she does not needs prednisone we can change to HC and assess HPA as outpatient    Even if AI present, would be secondary so would not need fludro but we will check renin and michael levels

## 2017-02-06 NOTE — ASSESSMENT & PLAN NOTE
Endocrine consulting  Continue pred 10 mg daily and dc on this dose  No further need for florinef

## 2017-02-06 NOTE — ANESTHESIA PREPROCEDURE EVALUATION
02/06/2017  Sisi Medel is a 56 y.o., female.    Pre-operative evaluation for Procedure(s) (LRB):  DECLOT GRAFT PERCUTANEOUS (Left)    Sisi Medel is a 56 y.o. female with PMH significant ESRD (HD MWF, anuric)), hypertension, hyperlipidemia, sarcoidosis (on prednisone), seizure D/O on keppra, chronic respiratory failure (2L-5L oxygen), Afib and diastolic heart failure (EF 50% 12/15/15) w/ a PPM currently admitted for clotted AV graft. Patient now scheduled for the above procedure. Pt has clotted off previously.    VSS on 2L NC. Mild anemia (Hg: 10.3). INR: 1.4    LDA:   R IJ HD catheter  18g R wrist  18g R hand    Prev airway: none in system    Drips: none currently    Patient Active Problem List   Diagnosis    Pulmonary sarcoidosis    Seizure disorder    Chronic respiratory failure with hypoxia    Other chronic pulmonary heart diseases    Morbid obesity with BMI of 40.0-44.9, adult    Chronic rhinitis    Peripheral neuropathy    Paroxysmal atrial fibrillation    Secondary pulmonary hypertension    DDD (degenerative disc disease), lumbar    Lumbar stenosis    Spondylarthrosis    Essential hypertension    Cervical radiculopathy    ESRD on hemodialysis    Gout    Hyperlipidemia    Anemia in ESRD (end-stage renal disease)    Lateral meniscal tear    Pes anserine bursitis    Complications due to renal dialysis device, implant, and graft    Chronic combined systolic and diastolic heart failure    Chronic gout due to renal impairment without tophus    Closed fracture of proximal end of right fibula    Long-term (current) use of anticoagulants    Muscle spasm    Constipation    Subclinical hyperthyroidism    Other complications due to renal dialysis device, implant, and graft    Arteriovenous fistula stenosis    SOB (shortness of breath)    Current chronic use of  systemic steroids    Immunosuppressed status    Hypoalbuminemia    Atrial fibrillation, persistent    GERD (gastroesophageal reflux disease)    Hypotension    Adrenal insufficiency    Dialysis AV fistula malfunction       Review of patient's allergies indicates:   Allergen Reactions    Bactrim [sulfamethoxazole-trimethoprim] Other (See Comments)     Causes renal failure    Nsaids (non-steroidal anti-inflammatory drug) Other (See Comments)     Renal failure        No current facility-administered medications on file prior to encounter.      Current Outpatient Prescriptions on File Prior to Encounter   Medication Sig Dispense Refill    ACZONE 5 % topical gel Apply to face every morning  2    ammonium lactate (LAC-HYDRIN) 12 % lotion Apply topically as needed (to legs).   2    aspirin 81 MG Chew Take 81 mg by mouth every morning.      atorvastatin (LIPITOR) 80 MG tablet Take 1 tablet (80 mg total) by mouth once daily. (Patient taking differently: Take 80 mg by mouth nightly. ) 90 tablet 1    fludrocortisone (FLORINEF) 0.1 mg Tab       gabapentin (NEURONTIN) 300 MG capsule Take 1 capsule (300 mg total) by mouth 3 (three) times daily. 270 capsule 0    lactulose (CHRONULAC) 20 gram/30 mL Soln Take 15 mLs (10 g total) by mouth 3 (three) times daily as needed (for constipation). 450 mL 1    levetiracetam (KEPPRA) 500 MG Tab Take 1 tablet (500 mg total) by mouth 2 (two) times daily. 60 tablet 5    metoprolol tartrate (LOPRESSOR) 25 MG tablet Take 1 tablet (25 mg total) by mouth 2 (two) times daily. 60 tablet 11    midodrine (PROAMATINE) 5 MG Tab 15 mg.       omeprazole (PRILOSEC) 20 MG capsule Take 1 capsule (20 mg total) by mouth once daily. 30 capsule 5    prednisoLONE acetate (PRED FORTE) 1 % DrpS INSTILL ONE DROP INTO  LEFT EYE  TWICE A DAY  3    predniSONE (DELTASONE) 10 MG tablet Takes one 10 mg tablet every morning 60 tablet 3    RENVELA 800 mg Tab TAKE 1 TABLET BY MOUTH THREE TIMES DAILY WITH  MEALS 90 tablet 0    tizanidine 4 mg Cap Take 1 capsule by mouth 2 (two) times daily as needed. 60 capsule 0    warfarin (COUMADIN) 5 MG tablet TAKE 1 TABLET BY MOUTH EVERYDAY EXCEPT 1 AND 1/2 TABLETS ON MONDAY AND FRIDAY OR AS DIRECTED BY CLINIC 189 tablet 0    amoxicillin-clavulanate 875-125mg (AUGMENTIN) 875-125 mg per tablet       econazole nitrate 1 % cream       ketorolac 0.5% (ACULAR) 0.5 % Drop instill one drop into both eyes every morning  3       Past Surgical History   Procedure Laterality Date    Abdominal surgery      Vascular surgery       section       x2    Other surgical history       loop recorder implant    Loop recorder         Social History     Social History    Marital status: Single     Spouse name: N/A    Number of children: N/A    Years of education: N/A     Occupational History    Not on file.     Social History Main Topics    Smoking status: Former Smoker     Packs/day: 0.50     Years: 10.00     Types: Cigarettes     Quit date: 1994    Smokeless tobacco: Never Used    Alcohol use No    Drug use: No    Sexual activity: Not on file     Other Topics Concern    Not on file     Social History Narrative    Lives with boyfriend/partner who helps with her care.     Plans to travel to [place] for 2 weeks in 2016         Vital Signs Range (Last 24H):  Temp:  [36.4 °C (97.5 °F)-36.9 °C (98.5 °F)]   Pulse:  []   Resp:  [14-27]   BP: ()/(59-92)   SpO2:  [94 %-100 %]       CBC:   Recent Labs      17   0407  17   0415   WBC  10.61  10.45   RBC  3.59*  3.30*   HGB  11.1*  10.3*   HCT  35.2*  33.3*   PLT  219  206   MCV  98  101*   MCH  30.9  31.2*   MCHC  31.5*  30.9*       CMP:   Recent Labs      17   0407  17   1320  17   0415   NA  136  136  138  138   K  4.7  4.7  4.7  4.6   CL  99  99  105  103   CO2  26  26  26  25   BUN  24*  24*  7  21*   CREATININE  5.0*  5.0*  1.9*  3.7*   GLU  148*  148*  157*  137*   MG  2.1  1.8   1.8   PHOS  2.6*  2.6*  1.9*  3.5   CALCIUM  8.9  8.9  8.8  9.4   ALBUMIN  3.1*  3.1*  3.1*  3.0*   PROT  7.2   --   6.8   ALKPHOS  126   --   118   ALT  13   --   11   AST  19   --   15   BILITOT  0.6   --   0.5       INR  Recent Labs      02/04/17   0804  02/06/17   0415   INR  1.6*  1.4*           Diagnostic Studies:      EKG:  Vent. Rate : 070 BPM     Atrial Rate : 340 BPM     P-R Int : 000 ms          QRS Dur : 092 ms      QT Int : 404 ms       P-R-T Axes : 000 -12 002 degrees     QTc Int : 436 ms    Atrial fibrillation with occasional ventricular-paced complexes  Nonspecific T wave abnormality  Abnormal ECG  When compared with ECG of 21-JAN-2017 08:02,  No significant change was found  Confirmed by CHARU SHAFER, JORGE LUIS ([id number]) on 2/4/2017 12:54:13 PM    2D Echo (6/26/2016):  1 - Upper limit of normal left ventricular enlargement.     2 - Low normal to mildly depressed left ventricular systolic function (EF 50-55%).     3 - Eccentric hypertrophy.     4 - Left ventricular diastolic dysfunction.     5 - Biatrial enlargement.     6 - Normal right ventricular systolic function .     7 - Trivial pulmonic regurgitation.     8 - Intermediate central venous pressure.         [facility] Anesthesia Evaluation    I have reviewed the Patient Summary Reports.     I have reviewed the Medications.     Review of Systems  Anesthesia Hx:  History of prior surgery of interest to airway management or planning:  Denies Personal Hx of Anesthesia complications.   Social:  Non-Smoker, No Alcohol Use    Hematology/Oncology:  Hematology Normal   Oncology Normal     EENT/Dental:EENT/Dental Normal   Cardiovascular:   Exercise tolerance: good Hypertension Dysrhythmias atrial fibrillation CHF (improved, EF recovered to ~50%, diastolic dys. Mostly resolved pHTN) NYHA Classification III    Pulmonary:   COPD (sarcoidosis - 2L continuous O2. Currently not SOB, able to do errands), severe    Renal/:   Chronic Renal Disease (yesterday - denies  SOB/fatigue/swelling), ESRD, Dialysis Increased AVG velocities   Hepatic/GI:  Hepatic/GI Normal    Musculoskeletal:  Musculoskeletal Normal    Neurological:   Seizures, well controlled    Dermatological:  Skin Normal    Psych:  Psychiatric Normal           Physical Exam  General:  Obesity    Airway/Jaw/Neck:  Airway Findings: Mouth Opening: Normal Tongue: Normal  General Airway Assessment: Adult, Average  Mallampati: II  TM Distance: Normal, at least 6 cm     Eyes/Ears/Nose:  EYES/EARS/NOSE FINDINGS: Normal   Dental:  Dental Findings: In tact    Chest/Lungs:  Chest/Lungs Findings: Normal Respiratory Rate, Expiratory Wheezes, Mild     Heart/Vascular:  Heart Findings: Rate: Normal  Rhythm: Irregularly Irregular  Sounds: Normal  Heart murmur: negative       Mental Status:  Mental Status Findings:  Cooperative, Alert and Oriented         Anesthesia Plan  Type of Anesthesia, risks & benefits discussed:  Anesthesia Type:  general, MAC  Patient's Preference:   Intra-op Monitoring Plan: standard ASA monitors  Intra-op Monitoring Plan Comments:   Post Op Pain Control Plan:   Post Op Pain Control Plan Comments:   Induction:   IV  Beta Blocker:  Patient is not currently on a Beta-Blocker (No further documentation required).       Informed Consent: Patient understands risks and agrees with Anesthesia plan.  Questions answered. Anesthesia consent signed with patient.  ASA Score: 3     Day of Surgery Review of History & Physical: I have interviewed and examined the patient. I have reviewed the patient's H&P dated:            Ready For Surgery From Anesthesia Perspective.

## 2017-02-06 NOTE — PROGRESS NOTES
[facility] Hospital Medicine  Progress Note    Patient Name: Sisi Medel  MRN: [id number]  Patient Class: IP- Inpatient   Admission Date: 2/4/2017  Length of Stay: 2 days  Attending Physician: Jeancarlos Bermudez MD  Primary Care Provider: Carlos A Caputo MD    [facility] Medicine Team: [facility] MED G Jeancarlos Berumdez MD    Subjective:     Principal Problem:Hypotension    HPI:  Ms. Sisi Medel is a 56 y.o. 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  states that she went for dialysis today for HD but her AV fistula clotted and unable to perform HD and went home. She is brought by her mother because she is feeling fatigue and generally weak. She states that no attempt to de-clot AV fistula at dialysis center. Patient reports that her BP always low but not sure why, she takes midodrine 15 mg before dialysis that she took this am. She you cough, SOB, fever, chills or diarrhea.   On my evaluation patient resting comfortable on 2 L NC, BP 91/56 , HR 78 in afib and not in distress.       Hospital Course:  2/4/17 Patient will be admitted to MICU for CRRT due to hypotension.   2/5/17 started on CRRT , tolerated well.  BP improved       Interval History:     Feels much better today than when she was admitted.  Much more energy.  Awaiting procedure tomorrow.      Review of Systems   All other systems reviewed and are negative.    Objective:     Vital Signs (Most Recent):  Temp: 97.5 °F (36.4 °C) (02/06/17 1521)  Pulse: 102 (02/06/17 1521)  Resp: 18 (02/06/17 1521)  BP: 110/69 (02/06/17 1521)  SpO2: 100 % (02/06/17 1521) Vital Signs (24h Range):  Temp:   [96.6 °F (35.9 °C)-98.5 °F (36.9 °C)] 97.5 °F (36.4 °C)  Pulse:  [] 102  Resp:  [18] 18  SpO2:  [97 %-100 %] 100 %  BP: ()/(37-96) 110/69     Weight: 120 kg (264 lb 8.8 oz)  Body mass index is 40.22 kg/(m^2).    Intake/Output Summary (Last 24 hours) at 02/06/17 1609  Last data filed at 02/06/17 1255   Gross per 24 hour   Intake             1000 ml   Output             4601 ml   Net            -3601 ml      Physical Exam   Constitutional: She appears well-nourished. No distress.   HENT:   Head: Normocephalic and atraumatic.   Pulmonary/Chest: Effort normal. No respiratory distress.   Abdominal: Soft. She exhibits no distension.   Neurological: She is alert.   Skin: Skin is warm.   Psychiatric: She has a normal mood and affect.   Vitals reviewed.      Significant Labs:   BMP:   Recent Labs  Lab 02/06/17  0415   *      K 4.6      CO2 25   BUN 21*   CREATININE 3.7*   CALCIUM 9.4   MG 1.8     CBC:   Recent Labs  Lab 02/05/17  0407 02/06/17  0415   WBC 10.61 10.45   HGB 11.1* 10.3*   HCT 35.2* 33.3*    206       Significant Imaging: None    Assessment/Plan:      Pulmonary sarcoidosis        Seizure disorder  Keppra BID      Paroxysmal atrial fibrillation  Holding anticoag for Declotting tomorrow  Add back lopressor for rate control now that pressures are better      ESRD on hemodialysis  Clotted Fistula  De-clotting tomorrow  Nephrology following  Temp line in place for HD      Current chronic use of systemic steroids  Endocrine consulting  Continue pred 10 mg daily and dc on this dose  No further need for florinef      VTE Risk Mitigation     None          Jeancarlos Bermudez MD  Department of Hospital Medicine   [facility]

## 2017-02-06 NOTE — SUBJECTIVE & OBJECTIVE
Interval HPI:   Overnight events: none  Eatin%  Nausea: No  Hypoglycemia and intervention: No  Fever: No  TPN and/or TF: No  If yes, type of TF/TPN and rate: n/a    PMH, PSH, FH, SH updated and reviewed     REVIEW OF SYSTEMS:  Constitutional: positive for fatigue, Negative for weight changes.  Eyes: Negative for visual disturbance.  Respiratory: Negative for cough.   Cardiovascular: Negative for chest pain.  Gastrointestinal: Negative for nausea  Endocrine: denies polyuria, polydipsia, nocturia.  Musculoskeletal: Negative for back pain.  Skin: Negative for rash.  Neurological: Negative for syncope.  Psychiatric/Behavioral: Negative for depression, anxiety.   All other systems reviewed and are negative.    Labs Reviewed and Include:      Recent Labs  Lab 17  0415   *   CALCIUM 9.4   ALBUMIN 3.0*   PROT 6.8      K 4.6   CO2 25      BUN 21*   CREATININE 3.7*   ALKPHOS 118   ALT 11   AST 15   BILITOT 0.5     Lab Results   Component Value Date    HGBA1C 5.7 2016       Nutritional status:   Body mass index is 40.22 kg/(m^2).  Lab Results   Component Value Date    ALBUMIN 3.0 (L) 2017    ALBUMIN 3.1 (L) 2017    ALBUMIN 3.1 (L) 2017    ALBUMIN 3.1 (L) 2017     Lab Results   Component Value Date    PREALBUMIN 15 (L) 2016       Estimated Creatinine Clearance: 23.4 mL/min (based on Cr of 3.7).      PHYSICAL EXAMINATION:  Vitals:    17 1100   BP: (!) 106/37   Pulse: 90   Resp:    Temp:      Body mass index is 40.22 kg/(m^2).    Constitutional: chronically ill appearing, obese  ENMT: External ears, nose with no mass or significant findings, Hearing intact  Neck:  No tracheal deviation present, No neck masses noted.  No thyromegaly or thyroid tenderness.  Cardiovascular:  No murmurs or abnormal sounds, no lower extremity edema bilaterally  Respiratory:  Normal effort, no use of accessory muscles, Normal breath sounds without adventitious sounds  Abdomen:   Soft, no masses, no tenderness, Bowel sounds are normal. No hernia noted  Skin:  No rashes, lesions or ulcers, no induration or nodules  Psychiatric:  Judgement and insight good with normal mood and affect  Musculoskeletal:  no clubbing, cyanosis or petechiae.  Neurological: Cranial nerves are grossly intact.

## 2017-02-06 NOTE — PLAN OF CARE
02/06/17 1715   Discharge Assessment   Assessment Type Discharge Planning Assessment   Confirmed/corrected address and phone number on facesheet? Yes   Assessment information obtained from? Patient;Medical Record   Expected Length of Stay (days) 3   Communicated expected length of stay with patient/caregiver yes   Prior to hospitilization cognitive status: Alert/Oriented   Prior to hospitalization functional status: Assistive Equipment   Current cognitive status: Alert/Oriented   Current Functional Status: Assistive Equipment   Arrived From home or self-care   Lives With significant other   Able to Return to Prior Arrangements yes   Is patient able to care for self after discharge? Yes   How many people do you have in your home that can help with your care after discharge? 1   Patient's perception of discharge disposition home or selfcare   Readmission Within The Last 30 Days no previous admission in last 30 days   Patient currently being followed by outpatient case management? No   Patient currently receives home health services? No  ([facility])   Does the patient currently use HME? Yes   Patient currently receives private duty nursing? No   Patient currently receives any other outside agency services? No   Equipment Currently Used at Home rollator;walker, rolling;cane, straight   Do you have any problems affording any of your prescribed medications? No   Is the patient taking medications as prescribed? yes   Do you have any financial concerns preventing you from receiving the healthcare you need? No   Does the patient have transportation to healthcare appointments? Yes   Transportation Available family or friend will provide   On Dialysis? Yes   If yes, what is the name of the dialysis unit? [facility] T/T/S 6am   Does the patient receive outpatient dialysis? No   Does the patient receive services at the [facility]? No   Are there any open cases? No   Discharge Plan A Home with  family   Patient/Family In Agreement With Plan yes

## 2017-02-06 NOTE — ASSESSMENT & PLAN NOTE
Primary team may consider resuming chronic prednisone 10mg inpatient without evidence of fever or acute illness  Unable to interpret 8am cortisol value while on chronic steroids  Extensive history of outpatient steroids, may discharge on prednisone 10mg from endocrine standpoint    Likely secondary adrenal insufficiency given chronic steroids and thus would not require florinef at discharge. May consider checking aldosterone and renin midweek with patient not having received florinef since last week

## 2017-02-06 NOTE — ASSESSMENT & PLAN NOTE
Holding anticoag for Declotting tomorrow  Add back lopressor for rate control now that pressures are better

## 2017-02-06 NOTE — SUBJECTIVE & OBJECTIVE
Interval History:     Feels much better today than when she was admitted.  Much more energy.  Awaiting procedure tomorrow.      Review of Systems   All other systems reviewed and are negative.    Objective:     Vital Signs (Most Recent):  Temp: 97.5 °F (36.4 °C) (02/06/17 1521)  Pulse: 102 (02/06/17 1521)  Resp: 18 (02/06/17 1521)  BP: 110/69 (02/06/17 1521)  SpO2: 100 % (02/06/17 1521) Vital Signs (24h Range):  Temp:  [96.6 °F (35.9 °C)-98.5 °F (36.9 °C)] 97.5 °F (36.4 °C)  Pulse:  [] 102  Resp:  [18] 18  SpO2:  [97 %-100 %] 100 %  BP: ()/(37-96) 110/69     Weight: 120 kg (264 lb 8.8 oz)  Body mass index is 40.22 kg/(m^2).    Intake/Output Summary (Last 24 hours) at 02/06/17 1609  Last data filed at 02/06/17 1255   Gross per 24 hour   Intake             1000 ml   Output             4601 ml   Net            -3601 ml      Physical Exam   Constitutional: She appears well-nourished. No distress.   HENT:   Head: Normocephalic and atraumatic.   Pulmonary/Chest: Effort normal. No respiratory distress.   Abdominal: Soft. She exhibits no distension.   Neurological: She is alert.   Skin: Skin is warm.   Psychiatric: She has a normal mood and affect.   Vitals reviewed.      Significant Labs:   BMP:   Recent Labs  Lab 02/06/17  0415   *      K 4.6      CO2 25   BUN 21*   CREATININE 3.7*   CALCIUM 9.4   MG 1.8     CBC:   Recent Labs  Lab 02/05/17  0407 02/06/17  0415   WBC 10.61 10.45   HGB 11.1* 10.3*   HCT 35.2* 33.3*    206       Significant Imaging: None

## 2017-02-06 NOTE — PROGRESS NOTES
Progress Note  General Surgery    Admit Date: 2/4/2017  Post-operative Day:    Hospital Day: 3    SUBJECTIVE: NAEON. Pt with RIJ trialysis in place. CRRT yesterday.      Follow-up For:  Procedure(s) (LRB):  DECLOT GRAFT PERCUTANEOUS (Left)    Scheduled Meds:   atorvastatin  80 mg Oral Nightly    gabapentin  300 mg Oral TID    levetiracetam  500 mg Oral BID    [START ON 2/7/2017] midodrine  10 mg Oral Every Tues, Thurs, Sat    predniSONE  20 mg Oral Daily    sevelamer carbonate  800 mg Oral TID WM    sodium chloride 0.9%  3 mL Intravenous Q8H     Continuous Infusions:   PRN Meds:    Review of patient's allergies indicates:   Allergen Reactions    Bactrim [sulfamethoxazole-trimethoprim] Other (See Comments)     Causes renal failure    Nsaids (non-steroidal anti-inflammatory drug) Other (See Comments)     Renal failure       OBJECTIVE:     Vital Signs (Most Recent)  Temp: 97.5 °F (36.4 °C) (02/06/17 0400)  Pulse: (!) 112 (02/06/17 0500)  Resp: 18 (02/06/17 0400)  BP: 117/83 (02/06/17 0400)  SpO2: 99 % (02/06/17 0400)    Vital Signs Range (Last 24H):  Temp:  [97.5 °F (36.4 °C)-98.5 °F (36.9 °C)]   Pulse:  []   Resp:  [14-33]   BP: ()/(58-84)   SpO2:  [94 %-100 %]     I & O (Last 24H):  Intake/Output Summary (Last 24 hours) at 02/06/17 0708  Last data filed at 02/06/17 0600   Gross per 24 hour   Intake             2160 ml   Output             2755 ml   Net             -595 ml     Physical Exam:  General: well developed, well nourished, appears stated age, no distress  Head: normocephalic, atraumatic  Eyes: PERRL, EOMI  Lungs: normal respiratory effort  Heart: + Afib  Extremities: thrombosed LUE AVG     Laboratory:  CBC:   Recent Labs  Lab 02/06/17  0415   WBC 10.45   RBC 3.30*   HGB 10.3*   HCT 33.3*      *   MCH 31.2*   MCHC 30.9*     BMP:   Recent Labs  Lab 02/06/17  0415   *      K 4.6      CO2 25   BUN 21*   CREATININE 3.7*   CALCIUM 9.4     Labs within the past  24 hours have been reviewed.      ASSESSMENT/PLAN:   56 y.o.  female with a PMHx of HTN, CHF, COPD on home O2, Paroxysmal AFib s/p BiV Pacemaker, sarcoidosis on Prednisone, and ESRD on HD via LUE AVG who presented with a thrombosed LUE AVG    -will plan for declot tomorrow  -NPO at midnight tonight  -INR 1.4 this am, on coumadin for afib currently holding prior to procedure  -please dialyze today to optimize fluid balance prior to OR tomorrow    Olivia Morrison MD  PGY-3 General Surgery  Pager: [phone]

## 2017-02-07 ENCOUNTER — TELEPHONE (OUTPATIENT)
Dept: ENDOCRINOLOGY | Facility: CLINIC | Age: 57
End: 2017-02-07

## 2017-02-07 ENCOUNTER — ANESTHESIA (OUTPATIENT)
Dept: SURGERY | Facility: HOSPITAL | Age: 57
DRG: 252 | End: 2017-02-07
Payer: MEDICARE

## 2017-02-07 ENCOUNTER — SURGERY (OUTPATIENT)
Age: 57
End: 2017-02-07

## 2017-02-07 LAB
ALBUMIN SERPL BCP-MCNC: 3.2 G/DL
ALP SERPL-CCNC: 114 U/L
ALT SERPL W/O P-5'-P-CCNC: 9 U/L
ANION GAP SERPL CALC-SCNC: 8 MMOL/L
AST SERPL-CCNC: 13 U/L
BASOPHILS # BLD AUTO: 0.05 K/UL
BASOPHILS NFR BLD: 0.5 %
BILIRUB SERPL-MCNC: 0.6 MG/DL
BUN SERPL-MCNC: 29 MG/DL
CALCIUM SERPL-MCNC: 9.6 MG/DL
CHLORIDE SERPL-SCNC: 102 MMOL/L
CO2 SERPL-SCNC: 26 MMOL/L
CREAT SERPL-MCNC: 4.6 MG/DL
DIFFERENTIAL METHOD: ABNORMAL
EOSINOPHIL # BLD AUTO: 0.1 K/UL
EOSINOPHIL NFR BLD: 0.6 %
ERYTHROCYTE [DISTWIDTH] IN BLOOD BY AUTOMATED COUNT: 15.3 %
EST. GFR  (AFRICAN AMERICAN): 11.6 ML/MIN/1.73 M^2
EST. GFR  (NON AFRICAN AMERICAN): 10 ML/MIN/1.73 M^2
GLUCOSE SERPL-MCNC: 140 MG/DL
HCT VFR BLD AUTO: 33.7 %
HGB BLD-MCNC: 10.4 G/DL
INR PPP: 1.1
LYMPHOCYTES # BLD AUTO: 2.3 K/UL
LYMPHOCYTES NFR BLD: 21.6 %
MAGNESIUM SERPL-MCNC: 2 MG/DL
MCH RBC QN AUTO: 30.9 PG
MCHC RBC AUTO-ENTMCNC: 30.9 %
MCV RBC AUTO: 100 FL
MONOCYTES # BLD AUTO: 1.5 K/UL
MONOCYTES NFR BLD: 14.2 %
NEUTROPHILS # BLD AUTO: 6.8 K/UL
NEUTROPHILS NFR BLD: 62.7 %
PHOSPHATE SERPL-MCNC: 3.1 MG/DL
PLATELET # BLD AUTO: 194 K/UL
PMV BLD AUTO: 10.6 FL
POCT GLUCOSE: 151 MG/DL (ref 70–110)
POTASSIUM SERPL-SCNC: 4.2 MMOL/L
PROT SERPL-MCNC: 7.1 G/DL
PROTHROMBIN TIME: 11.6 SEC
RBC # BLD AUTO: 3.37 M/UL
SODIUM SERPL-SCNC: 136 MMOL/L
WBC # BLD AUTO: 10.78 K/UL

## 2017-02-07 PROCEDURE — 057A3DZ DILATION OF LEFT BRACHIAL VEIN WITH INTRALUMINAL DEVICE, PERCUTANEOUS APPROACH: ICD-10-PCS | Performed by: SURGERY

## 2017-02-07 PROCEDURE — 85610 PROTHROMBIN TIME: CPT

## 2017-02-07 PROCEDURE — 3E04317 INTRODUCTION OF OTHER THROMBOLYTIC INTO CENTRAL VEIN, PERCUTANEOUS APPROACH: ICD-10-PCS | Performed by: SURGERY

## 2017-02-07 PROCEDURE — 36000706: Performed by: SURGERY

## 2017-02-07 PROCEDURE — 25500020 PHARM REV CODE 255: Performed by: SURGERY

## 2017-02-07 PROCEDURE — C1887 CATHETER, GUIDING: HCPCS | Performed by: SURGERY

## 2017-02-07 PROCEDURE — 36000707: Performed by: SURGERY

## 2017-02-07 PROCEDURE — C1725 CATH, TRANSLUMIN NON-LASER: HCPCS | Performed by: SURGERY

## 2017-02-07 PROCEDURE — 80100016 HC MAINTENANCE HEMODIALYSIS

## 2017-02-07 PROCEDURE — 25000003 PHARM REV CODE 250: Performed by: INTERNAL MEDICINE

## 2017-02-07 PROCEDURE — C1769 GUIDE WIRE: HCPCS | Performed by: SURGERY

## 2017-02-07 PROCEDURE — 37000009 HC ANESTHESIA EA ADD 15 MINS: Performed by: SURGERY

## 2017-02-07 PROCEDURE — 25000003 PHARM REV CODE 250: Performed by: STUDENT IN AN ORGANIZED HEALTH CARE EDUCATION/TRAINING PROGRAM

## 2017-02-07 PROCEDURE — 25000003 PHARM REV CODE 250: Performed by: ANESTHESIOLOGY

## 2017-02-07 PROCEDURE — 63600175 PHARM REV CODE 636 W HCPCS: Performed by: NURSE ANESTHETIST, CERTIFIED REGISTERED

## 2017-02-07 PROCEDURE — 63600175 PHARM REV CODE 636 W HCPCS: Performed by: ANESTHESIOLOGY

## 2017-02-07 PROCEDURE — 37000008 HC ANESTHESIA 1ST 15 MINUTES: Performed by: SURGERY

## 2017-02-07 PROCEDURE — 25000003 PHARM REV CODE 250: Performed by: PHYSICIAN ASSISTANT

## 2017-02-07 PROCEDURE — 63600175 PHARM REV CODE 636 W HCPCS: Performed by: INTERNAL MEDICINE

## 2017-02-07 PROCEDURE — D9220A PRA ANESTHESIA: Mod: ANES,,, | Performed by: ANESTHESIOLOGY

## 2017-02-07 PROCEDURE — 25000003 PHARM REV CODE 250: Performed by: HOSPITALIST

## 2017-02-07 PROCEDURE — 84100 ASSAY OF PHOSPHORUS: CPT

## 2017-02-07 PROCEDURE — 71000044 HC DOSC ROUTINE RECOVERY FIRST HOUR: Performed by: SURGERY

## 2017-02-07 PROCEDURE — 82962 GLUCOSE BLOOD TEST: CPT | Performed by: SURGERY

## 2017-02-07 PROCEDURE — 05CA3ZZ EXTIRPATION OF MATTER FROM LEFT BRACHIAL VEIN, PERCUTANEOUS APPROACH: ICD-10-PCS | Performed by: SURGERY

## 2017-02-07 PROCEDURE — 25000003 PHARM REV CODE 250: Performed by: NURSE ANESTHETIST, CERTIFIED REGISTERED

## 2017-02-07 PROCEDURE — 80053 COMPREHEN METABOLIC PANEL: CPT

## 2017-02-07 PROCEDURE — 36906 THRMBC/NFS DIALYSIS CIRCUIT: CPT | Mod: ,,, | Performed by: SURGERY

## 2017-02-07 PROCEDURE — 63600175 PHARM REV CODE 636 W HCPCS: Performed by: SURGERY

## 2017-02-07 PROCEDURE — 85025 COMPLETE CBC W/AUTO DIFF WBC: CPT

## 2017-02-07 PROCEDURE — D9220A PRA ANESTHESIA: Mod: CRNA,,, | Performed by: NURSE ANESTHETIST, CERTIFIED REGISTERED

## 2017-02-07 PROCEDURE — 83735 ASSAY OF MAGNESIUM: CPT

## 2017-02-07 PROCEDURE — 71000045 HC DOSC ROUTINE RECOVERY EA ADD'L HR: Performed by: SURGERY

## 2017-02-07 PROCEDURE — 90935 HEMODIALYSIS ONE EVALUATION: CPT | Mod: ,,, | Performed by: INTERNAL MEDICINE

## 2017-02-07 PROCEDURE — 11000001 HC ACUTE MED/SURG PRIVATE ROOM

## 2017-02-07 PROCEDURE — 25000003 PHARM REV CODE 250: Performed by: SURGERY

## 2017-02-07 PROCEDURE — 82088 ASSAY OF ALDOSTERONE: CPT

## 2017-02-07 PROCEDURE — 99232 SBSQ HOSP IP/OBS MODERATE 35: CPT | Mod: ,,, | Performed by: INTERNAL MEDICINE

## 2017-02-07 PROCEDURE — 84244 ASSAY OF RENIN: CPT

## 2017-02-07 PROCEDURE — C1874 STENT, COATED/COV W/DEL SYS: HCPCS | Performed by: SURGERY

## 2017-02-07 PROCEDURE — C1894 INTRO/SHEATH, NON-LASER: HCPCS | Performed by: SURGERY

## 2017-02-07 PROCEDURE — C1757 CATH, THROMBECTOMY/EMBOLECT: HCPCS | Performed by: SURGERY

## 2017-02-07 DEVICE — STENT VIABAHN 8X5X120: Type: IMPLANTABLE DEVICE | Site: VEIN | Status: FUNCTIONAL

## 2017-02-07 RX ORDER — LIDOCAINE HYDROCHLORIDE 10 MG/ML
INJECTION, SOLUTION EPIDURAL; INFILTRATION; INTRACAUDAL; PERINEURAL
Status: DISCONTINUED | OUTPATIENT
Start: 2017-02-07 | End: 2017-02-07 | Stop reason: HOSPADM

## 2017-02-07 RX ORDER — MIDAZOLAM HYDROCHLORIDE 1 MG/ML
INJECTION, SOLUTION INTRAMUSCULAR; INTRAVENOUS
Status: DISCONTINUED | OUTPATIENT
Start: 2017-02-07 | End: 2017-02-07

## 2017-02-07 RX ORDER — IODIXANOL 320 MG/ML
INJECTION, SOLUTION INTRAVASCULAR
Status: DISCONTINUED | OUTPATIENT
Start: 2017-02-07 | End: 2017-02-07 | Stop reason: HOSPADM

## 2017-02-07 RX ORDER — FENTANYL CITRATE 50 UG/ML
25 INJECTION, SOLUTION INTRAMUSCULAR; INTRAVENOUS EVERY 5 MIN PRN
Status: DISCONTINUED | OUTPATIENT
Start: 2017-02-07 | End: 2017-02-08

## 2017-02-07 RX ORDER — OXYCODONE HYDROCHLORIDE 5 MG/1
10 TABLET ORAL ONCE
Status: COMPLETED | OUTPATIENT
Start: 2017-02-07 | End: 2017-02-07

## 2017-02-07 RX ORDER — SODIUM CHLORIDE 0.9 % (FLUSH) 0.9 %
3 SYRINGE (ML) INJECTION
Status: DISCONTINUED | OUTPATIENT
Start: 2017-02-07 | End: 2017-02-08

## 2017-02-07 RX ORDER — SODIUM CHLORIDE 9 MG/ML
INJECTION, SOLUTION INTRAVENOUS
Status: DISCONTINUED | OUTPATIENT
Start: 2017-02-07 | End: 2017-02-08

## 2017-02-07 RX ORDER — CLOPIDOGREL BISULFATE 75 MG/1
300 TABLET ORAL ONCE
Status: DISCONTINUED | OUTPATIENT
Start: 2017-02-07 | End: 2017-02-11 | Stop reason: HOSPADM

## 2017-02-07 RX ORDER — LIDOCAINE HCL/PF 100 MG/5ML
SYRINGE (ML) INTRAVENOUS
Status: DISCONTINUED | OUTPATIENT
Start: 2017-02-07 | End: 2017-02-07

## 2017-02-07 RX ORDER — POLYETHYLENE GLYCOL 3350 17 G/17G
17 POWDER, FOR SOLUTION ORAL 2 TIMES DAILY
Status: DISCONTINUED | OUTPATIENT
Start: 2017-02-07 | End: 2017-02-11 | Stop reason: HOSPADM

## 2017-02-07 RX ORDER — HEPARIN SODIUM 1000 [USP'U]/ML
INJECTION, SOLUTION INTRAVENOUS; SUBCUTANEOUS
Status: DISCONTINUED | OUTPATIENT
Start: 2017-02-07 | End: 2017-02-07 | Stop reason: HOSPADM

## 2017-02-07 RX ORDER — HEPARIN SODIUM 1000 [USP'U]/ML
INJECTION, SOLUTION INTRAVENOUS; SUBCUTANEOUS
Status: DISCONTINUED | OUTPATIENT
Start: 2017-02-07 | End: 2017-02-07

## 2017-02-07 RX ORDER — DICYCLOMINE HYDROCHLORIDE 20 MG/1
20 TABLET ORAL
Status: DISCONTINUED | OUTPATIENT
Start: 2017-02-07 | End: 2017-02-10

## 2017-02-07 RX ORDER — FENTANYL CITRATE 50 UG/ML
INJECTION, SOLUTION INTRAMUSCULAR; INTRAVENOUS
Status: DISCONTINUED | OUTPATIENT
Start: 2017-02-07 | End: 2017-02-07

## 2017-02-07 RX ORDER — CLOPIDOGREL BISULFATE 75 MG/1
75 TABLET ORAL DAILY
Status: DISCONTINUED | OUTPATIENT
Start: 2017-02-08 | End: 2017-02-11 | Stop reason: HOSPADM

## 2017-02-07 RX ORDER — SODIUM CHLORIDE 0.9 % (FLUSH) 0.9 %
3 SYRINGE (ML) INJECTION EVERY 8 HOURS
Status: DISCONTINUED | OUTPATIENT
Start: 2017-02-07 | End: 2017-02-08

## 2017-02-07 RX ORDER — PROPOFOL 10 MG/ML
VIAL (ML) INTRAVENOUS CONTINUOUS PRN
Status: DISCONTINUED | OUTPATIENT
Start: 2017-02-07 | End: 2017-02-07

## 2017-02-07 RX ORDER — SODIUM CHLORIDE 9 MG/ML
INJECTION, SOLUTION INTRAVENOUS CONTINUOUS PRN
Status: DISCONTINUED | OUTPATIENT
Start: 2017-02-07 | End: 2017-02-07

## 2017-02-07 RX ORDER — CEFAZOLIN SODIUM 1 G/3ML
INJECTION, POWDER, FOR SOLUTION INTRAMUSCULAR; INTRAVENOUS
Status: DISCONTINUED | OUTPATIENT
Start: 2017-02-07 | End: 2017-02-07

## 2017-02-07 RX ORDER — SODIUM CHLORIDE 9 MG/ML
INJECTION, SOLUTION INTRAVENOUS ONCE
Status: COMPLETED | OUTPATIENT
Start: 2017-02-07 | End: 2017-02-07

## 2017-02-07 RX ORDER — ONDANSETRON 2 MG/ML
4 INJECTION INTRAMUSCULAR; INTRAVENOUS DAILY PRN
Status: DISCONTINUED | OUTPATIENT
Start: 2017-02-07 | End: 2017-02-08

## 2017-02-07 RX ORDER — HYDROCODONE BITARTRATE AND ACETAMINOPHEN 5; 325 MG/1; MG/1
1 TABLET ORAL EVERY 4 HOURS PRN
Status: DISCONTINUED | OUTPATIENT
Start: 2017-02-07 | End: 2017-02-10

## 2017-02-07 RX ADMIN — LEVETIRACETAM 500 MG: 500 TABLET, FILM COATED ORAL at 09:02

## 2017-02-07 RX ADMIN — GABAPENTIN 300 MG: 300 CAPSULE ORAL at 05:02

## 2017-02-07 RX ADMIN — ATORVASTATIN CALCIUM 80 MG: 20 TABLET, FILM COATED ORAL at 09:02

## 2017-02-07 RX ADMIN — FENTANYL CITRATE 50 MCG: 50 INJECTION, SOLUTION INTRAMUSCULAR; INTRAVENOUS at 12:02

## 2017-02-07 RX ADMIN — CEFAZOLIN 2 G: 1 INJECTION, POWDER, FOR SOLUTION INTRAVENOUS at 12:02

## 2017-02-07 RX ADMIN — PREDNISONE 20 MG: 20 TABLET ORAL at 09:02

## 2017-02-07 RX ADMIN — LIDOCAINE HYDROCHLORIDE 50 MG: 20 INJECTION, SOLUTION INTRAVENOUS at 12:02

## 2017-02-07 RX ADMIN — MIDAZOLAM HYDROCHLORIDE 1 MG: 1 INJECTION, SOLUTION INTRAMUSCULAR; INTRAVENOUS at 12:02

## 2017-02-07 RX ADMIN — IODIXANOL 23 ML: 320 INJECTION, SOLUTION INTRAVASCULAR at 01:02

## 2017-02-07 RX ADMIN — FENTANYL CITRATE 50 MCG: 50 INJECTION, SOLUTION INTRAMUSCULAR; INTRAVENOUS at 01:02

## 2017-02-07 RX ADMIN — LIDOCAINE HYDROCHLORIDE 16 ML: 10 INJECTION, SOLUTION EPIDURAL; INFILTRATION; INTRACAUDAL; PERINEURAL at 12:02

## 2017-02-07 RX ADMIN — HEPARIN SODIUM 5000 UNITS: 1000 INJECTION, SOLUTION INTRAVENOUS; SUBCUTANEOUS at 12:02

## 2017-02-07 RX ADMIN — SODIUM CHLORIDE: 0.9 INJECTION, SOLUTION INTRAVENOUS at 04:02

## 2017-02-07 RX ADMIN — HEPARIN SODIUM 10000 UNITS: 1000 INJECTION, SOLUTION INTRAVENOUS; SUBCUTANEOUS at 12:02

## 2017-02-07 RX ADMIN — ONDANSETRON 4 MG: 2 INJECTION INTRAMUSCULAR; INTRAVENOUS at 05:02

## 2017-02-07 RX ADMIN — METOPROLOL TARTRATE 25 MG: 25 TABLET ORAL at 09:02

## 2017-02-07 RX ADMIN — GABAPENTIN 300 MG: 300 CAPSULE ORAL at 09:02

## 2017-02-07 RX ADMIN — PROPOFOL 50 MCG/KG/MIN: 10 INJECTION, EMULSION INTRAVENOUS at 12:02

## 2017-02-07 RX ADMIN — HYDROCODONE BITARTRATE AND ACETAMINOPHEN 1 TABLET: 5; 325 TABLET ORAL at 05:02

## 2017-02-07 RX ADMIN — ALTEPLASE 6 MG: 2.2 INJECTION, POWDER, LYOPHILIZED, FOR SOLUTION INTRAVENOUS at 12:02

## 2017-02-07 RX ADMIN — SODIUM CHLORIDE: 0.9 INJECTION, SOLUTION INTRAVENOUS at 12:02

## 2017-02-07 RX ADMIN — HYDROCODONE BITARTRATE AND ACETAMINOPHEN 1 TABLET: 5; 325 TABLET ORAL at 11:02

## 2017-02-07 RX ADMIN — SODIUM CHLORIDE, PRESERVATIVE FREE 3 ML: 5 INJECTION INTRAVENOUS at 09:02

## 2017-02-07 RX ADMIN — DICYCLOMINE HYDROCHLORIDE 20 MG: 20 TABLET ORAL at 09:02

## 2017-02-07 RX ADMIN — MIDODRINE HYDROCHLORIDE 10 MG: 5 TABLET ORAL at 04:02

## 2017-02-07 RX ADMIN — SODIUM CHLORIDE, PRESERVATIVE FREE 3 ML: 5 INJECTION INTRAVENOUS at 05:02

## 2017-02-07 RX ADMIN — OXYCODONE HYDROCHLORIDE 10 MG: 5 TABLET ORAL at 06:02

## 2017-02-07 NOTE — PROGRESS NOTES
Dr. Roberts at bedside and aware pulse ox is not working unless we use RT. Pt 95% on 2L according to RT. Spoke w/ RT to get one more pulse ox reading before pt released.

## 2017-02-07 NOTE — PROGRESS NOTES
Report given to floor nurse Carrie, verbalized understanding. Pt in NADN. No complaints at this time. Pt to go to dialysis from phase 2. 2L O2 NC maintained. Tele box maintained.

## 2017-02-07 NOTE — TELEPHONE ENCOUNTER
----- Message from Cirilo Broussard MD sent at 2/6/2017  4:22 PM CST -----  Please schedule outpatient follow-up with Dr. Broussrad within the next 3-4 weeks. Patient was in hospital with adrenal insufficiency

## 2017-02-07 NOTE — PLAN OF CARE
Problem: Patient Care Overview  Goal: Plan of Care Review  Outcome: Ongoing (interventions implemented as appropriate)  POC reviewed with pt and family at 1400. Pt verbalized understanding. Questions and concerns addressed. No acute events today. Pt progressing toward goals. Will continue to monitor. See flowsheets for full assessment and VS info.

## 2017-02-07 NOTE — TRANSFER OF CARE
"Anesthesia Transfer of Care Note    Patient: Sisi Medel    Procedure(s) Performed: Procedure(s) (LRB):  DECLOT GRAFT PERCUTANEOUS (Left)  ANGIOPLASTY-PERCUTANEOUS TRANSLUMINAL (PTA) (Left)  PLACEMENT-STENT (Left)    Patient location: PACU    Anesthesia Type: general    Transport from OR: Transported from OR on 2-3 L/min O2 by NC with adequate spontaneous ventilation    Post pain: adequate analgesia    Post assessment: no apparent anesthetic complications    Post vital signs: stable    Level of consciousness: awake, alert and lethargic    Nausea/Vomiting: no nausea/vomiting    Complications: none          Last vitals:   Visit Vitals    /81 (BP Location: Right arm, Patient Position: Lying, BP Method: Automatic)    Pulse 104    Temp 36.5 °C (97.7 °F) (Oral)    Resp 16    Ht 5' 8" (1.727 m)    Wt 118.4 kg (261 lb 0.4 oz)    LMP  (LMP Unknown)    SpO2 100%    Breastfeeding No    BMI 39.69 kg/m2     "

## 2017-02-07 NOTE — OP NOTE
[facility]  Vascular Surgery  Operative Note    SUMMARY     Date of Procedure: 2/7/2017     Procedure:   1. Percutaneous Declot (mechanical and pharmacologic thrombectomy) of the LUE AVG  2. PTA (6x40, 7x40 [place]) of the left brachial vein & stent (8x50 Viabahn) of left brachial vein   3. LUE AVG fistulogram   4. US guided access of the LUE AVG x2    Surgeon(s) and Role:     * Torrey Villafana MD - Primary     * Aleyda Contreras MD - Resident - Assisting     * Chani Castillo MD - Fellow      Pre-Operative Diagnosis: Dialysis AV fistula malfunction, subsequent encounter [T82.590D]; Thrombosed LUE AVG     Post-Operative Diagnosis: Post-Op Diagnosis Codes:     * Dialysis AV fistula malfunction, subsequent encounter [T82.590D]; Thrombosed LUE AVG     Anesthesia: General/MAC    Description of the Findings of the Procedure: successful declot of LUE AVG; PTA & stent of the left brachial venous outflow stenosis         Complications: No       Implants:   Implant Name Type Inv. Item Serial No.  Lot No. LRB No. Used   STENT VIABAHN [id number] - [id number]   STENT VIABAHN [id number] [employer]   Left 1       Specimens:   Specimen     None           Estimated Blood Loss (EBL): 10cc        Indication: 56 y.o.  female with a PMHx of HTN, CHF, COPD on home O2, Paroxysmal AFib s/p BiV Pacemaker, sarcoidosis on Prednisone, and ESRD on HD via LUE AVG who presents with a thrombosed LUE AVG    Operative Dictation:   After an informed consent was obtained, the patient was brought to the Cath Lab and placed in the supine position. The left upper extremity was prepped and draped in the standard surgical fashion. Ultrasound was used to assess the left AVG which noted collapse of the graft and thrombus throughout the graft. The area overlying the planned access site was anesthetized using 1% lidocaine. Using ultrasound guidance, the distal aspect of the left AVG (by the arterial  anastomosis) was accessed in the Venous facing direction with a micropuncture needle and Mandril wire, followed by the micropuncture sheath. The mandril wire was exchanged for a J wire and the micropuncture sheath was exchanged for a 6 Yi short sheath. The patient was anticoagulated with IV heparin (a total of 5000 units were given during the case). The J wire was exchanged for an 0.035 angled glide wire and with the assistance of an angled glide catheter the wire was threaded through the thrombosed AV graft and into the subclavian vein. Next, the Possis Angiojet AVX catheter was placed through the venous facing sheath and 6mg of TPA was pulse sprayed throughout the length of the LUE AVG and allowed to sit. Next, the ultrasound was used to assess the left AVG in the proximal aspect of the graft, which again noted thrombus w/in the graft. The area overlying the planned access site was anesthetized using 1% lidocaine. Using ultrasound guidance, the distal aspect of the left AVG was accessed in an Arterial facing direction with a micropuncture needle and Mandril wire, followed by the micropuncture sheath. The mandril wire was exchanged for a J wire and the micropuncture sheath was exchanged for a 6 Yi short sheath.  Next, a 0.018 inch wire was placed into the arterial facing sheath and advanced under image guidance into the distal forearm. An over the wire charley was placed through the arterial facing sheath over the wire and carefully inflated while drawing back toward the sheath to perform thrombectomy of the arterial facing end of the AV graft . This step was repeated three times til all thrombus was felt to be removed. Next, the Possis Angiojet AVX catheter was placed through the venous facing sheath and the clot was aspirated with percutaneous mechanical thrombectomy. This was repeated several times to ensure adequate thrombectomy. A follow up angiogram was performed which demonstrated two areas of high  grade stenosis of the venous outflow. Based on these images a decision was made to intervene. The lesions were treated with a 6x40 mm high-pressure, noncompliant balloon was deployed across the brachial vein outflow stenoses (first treating the lesion 4cm proximal to the veto-anastomotic venous stenosis, however, the balloon appeared to be undersized for both lesions. Follow up angiogram was performed which demonstrated some improvement but persistence of the brachial outflow stenoses. The 6x40mm Dougherty was exchanged over the wire for a 7x40mm Dougherty which was used to treat both brachial vein outflow stenoses under image guidance. Follow up angiogram was performed which demonstrated resolution of the stenosis located at the venous-graft anastomosis, however, the left brachial vein stenosis 4cm proximal to the anastomosis persisted. Based on these findings a decision was made to treat this proximal lesion with a covered self expanding stent. The short 6F sheath was exchanged over the wire for a 7F sheath. A glide catheter was placed over the 0.035 glide wire into the subclavian vein and the 0.035 wire was exchanged for the 0.018 wire which was used to select the proximal left subclavian vein and the glide cath was removed. A 8x50mm Viabahn was placed over the wire, across the proximal brachial vein stenosis and deployed under fluoroscopic guidance. Post-stent PTA was performed with the 7x40mm Dougherty. Images were obtained for permanent record. Follow up angiogram was performed which demonstrated resolution of the previously visualized stenoses. At this point a strong thrill could be felt through the graft. The wires and sheaths were removed, 3-0 nylon U-stitches were placed with good hemostasis.                  Condition: Good    Disposition: PACU - hemodynamically stable.    Chani Castillo DO   Vascular Surgery Fellow  02/07/2017

## 2017-02-07 NOTE — ANESTHESIA POSTPROCEDURE EVALUATION
"Anesthesia Post Evaluation    Patient: Sisi Medel    Procedure(s) Performed: Procedure(s) (LRB):  DECLOT GRAFT PERCUTANEOUS (Left)  ANGIOPLASTY-PERCUTANEOUS TRANSLUMINAL (PTA) (Left)  PLACEMENT-STENT (Left)    Final Anesthesia Type: MAC  Patient location during evaluation: PACU  Patient participation: Yes- Able to Participate  Level of consciousness: awake and alert, awake and oriented  Post-procedure vital signs: reviewed and stable  Pain management: adequate  Airway patency: patent  PONV status at discharge: No PONV  Anesthetic complications: no      Cardiovascular status: blood pressure returned to baseline and hemodynamically stable  Respiratory status: unassisted, spontaneous ventilation and nasal cannula  Hydration status: euvolemic  Follow-up not needed.        Visit Vitals    /82    Pulse 85    Temp 36.5 °C (97.7 °F) (Skin)    Resp 16    Ht 5' 8" (1.727 m)    Wt 118.4 kg (261 lb 0.4 oz)    LMP  (LMP Unknown)    SpO2 100%    Breastfeeding No    BMI 39.69 kg/m2       Pain/Leslie Score: Pain Assessment Performed: Yes (2/7/2017  1:48 PM)  Presence of Pain: complains of pain/discomfort (2/7/2017  1:48 PM)  Leslie Score: 9 (2/7/2017  1:48 PM)      "

## 2017-02-07 NOTE — PROGRESS NOTES
Progress Note  General Surgery    Admit Date: 2/4/2017  Post-operative Day:    Hospital Day: 4    SUBJECTIVE: NAEON. Pt with RIJ trialysis in place. HD yesterday.      Follow-up For:  Procedure(s) (LRB):  DECLOT GRAFT PERCUTANEOUS (Left)    Scheduled Meds:   sodium chloride 0.9%   Intravenous Once    atorvastatin  80 mg Oral Nightly    gabapentin  300 mg Oral TID    levetiracetam  500 mg Oral BID    metoprolol tartrate  25 mg Oral BID    midodrine  10 mg Oral Every Tues, Thurs, Sat    predniSONE  20 mg Oral Daily    sevelamer carbonate  800 mg Oral TID WM    sodium chloride 0.9%  3 mL Intravenous Q8H     Continuous Infusions:   PRN Meds:    Review of patient's allergies indicates:   Allergen Reactions    Bactrim [sulfamethoxazole-trimethoprim] Other (See Comments)     Causes renal failure    Nsaids (non-steroidal anti-inflammatory drug) Other (See Comments)     Renal failure       OBJECTIVE:     Vital Signs (Most Recent)  Temp: 97.4 °F (36.3 °C) (02/07/17 0400)  Pulse: 81 (02/07/17 0500)  Resp: 18 (02/07/17 0400)  BP: (!) 124/90 (02/07/17 0400)  SpO2: 100 % (02/07/17 0400)    Vital Signs Range (Last 24H):  Temp:  [96.6 °F (35.9 °C)-98.6 °F (37 °C)]   Pulse:  []   Resp:  [18]   BP: ()/(37-96)   SpO2:  [96 %-100 %]     I & O (Last 24H):    Intake/Output Summary (Last 24 hours) at 02/07/17 0821  Last data filed at 02/07/17 0600   Gross per 24 hour   Intake             1000 ml   Output             4601 ml   Net            -3601 ml     Physical Exam:  General: well developed, well nourished, appears stated age, no distress  Head: normocephalic, atraumatic  Eyes: PERRL, EOMI  Lungs: normal respiratory effort  Heart: + Afib  Extremities: thrombosed LUE AVG     Laboratory:  CBC:     Recent Labs  Lab 02/07/17  0420   WBC 10.78   RBC 3.37*   HGB 10.4*   HCT 33.7*      *   MCH 30.9   MCHC 30.9*     BMP:     Recent Labs  Lab 02/07/17  0420   *      K 4.2      CO2 26   BUN  29*   CREATININE 4.6*   CALCIUM 9.6     Labs within the past 24 hours have been reviewed.      ASSESSMENT/PLAN:   56 y.o.  female with a PMHx of HTN, CHF, COPD on home O2, Paroxysmal AFib s/p BiV Pacemaker, sarcoidosis on Prednisone, and ESRD on HD via LUE AVG who presented with a thrombosed LUE AVG    -will plan for declot today  -NPO   -INR 1.1 this am, on coumadin for afib currently holding prior to procedure    Olivia Morrison MD  PGY-3 General Surgery  Pager: [phone]

## 2017-02-07 NOTE — PROGRESS NOTES
[facility]  Consult Note Nephrology    Admit Date: 2/4/2017  Consult Requested By: Litzy Whitaker MD   LOS: 3 days     SUBJECTIVE:     Follow-up For: ESRD on iHD    Interval History:  NAEON, decloted AVF today seen in AD, BP stable and toleratingHD well AVF working great.    Review of Systems:  Reviewed over encounter  OBJECTIVE:     Vital Signs (Most Recent)  Temp: 97.5 °F (36.4 °C) (02/07/17 1633)  Pulse: 86 (02/07/17 1645)  Resp: 18 (02/07/17 1633)  BP: (!) 115/93 (02/07/17 1645)  SpO2: 100 % (02/07/17 1633)    Vital Signs Range (Last 24H):  Temp:  [97 °F (36.1 °C)-98.6 °F (37 °C)]   Pulse:  []   Resp:  [16-18]   BP: (100-130)/(7-93)   SpO2:  [95 %-100 %]       Intake/Output Summary (Last 24 hours) at 02/07/17 1702  Last data filed at 02/07/17 0600   Gross per 24 hour   Intake              400 ml   Output                0 ml   Net              400 ml         Physical Exam:   General: Well developed, well nourished in NAD  HEENT: Conjunctiva clear; Oropharynx clear  Neck: No JVD noted, Supple  CV- Normal S1, S2 with no murmurs,gallops,rubs  Resp- Lungs CTA Bilaterally, Unlabored  Abdomen- NTND, BS normoactive x4 quads, soft  Extrem- No cyanosis, clubbing, edema.  Skin- No rashes, lesions, ulcers  Neuro: awake, Oriented x3, no FND      Laboratory:  CBC:     Recent Labs  Lab 02/07/17 0420   WBC 10.78   RBC 3.37*   HGB 10.4*   HCT 33.7*      *   MCH 30.9   MCHC 30.9*     BMP:     Recent Labs  Lab 02/07/17  0420   *      CO2 26   BUN 29*   CREATININE 4.6*   CALCIUM 9.6   MG 2.0     CMP:     Recent Labs  Lab 02/07/17 0420   *   CALCIUM 9.6   ALBUMIN 3.2*   PROT 7.1      K 4.2   CO2 26      BUN 29*   CREATININE 4.6*   ALKPHOS 114   ALT 9*   AST 13   BILITOT 0.6     PTH: No results for input(s): PTH in the last 168 hours.  Coagulation:     Recent Labs  Lab 02/07/17  0420   INR 1.1     Cardiac Markers: No results for input(s): CKMB, TROPONINT,  MYOGLOBIN in the last 168 hours.  ABGs: No results for input(s): PH, PCO2, HCO3, POCSATURATED, BE in the last 168 hours.    Labs reviewed  Diagnostic Results:  X-Ray: Reviewed  US: Reviewed  Echo: Reviewed    ASSESSMENT/PLAN:     Active Hospital Problems    Diagnosis  POA    *Hypotension [I95.9]  Yes    Vitamin D insufficiency [E55.9]  Unknown     Recommend vit d repletion      Impaired glucose tolerance [R73.02]  Unknown     poct glucose ac/hs and low dose SSI      Dialysis AV fistula malfunction [T82.590A]  Yes    Current chronic use of systemic steroids [Z79.52]  Not Applicable     Chronic    Anemia in ESRD (end-stage renal disease) [N18.6, D63.1]  Yes     Chronic    ESRD on hemodialysis [N18.6, Z99.2]  Not Applicable     Chronic    Secondary pulmonary hypertension [I27.2]  Yes     Chronic    Paroxysmal atrial fibrillation [I48.0]  Yes     Chronic    Morbid obesity with BMI of 40.0-44.9, adult [E66.01, Z68.41]  Not Applicable     Chronic    Chronic combined systolic and diastolic heart failure [I50.42]  Yes     Chronic     EF 20% 2013, improved with Medical therapy, negative PET 2013      Pulmonary sarcoidosis [D86.0]  Yes     Chronic    Seizure disorder [G40.909]  Yes     Chronic      Resolved Hospital Problems    Diagnosis Date Resolved POA   No resolved problems to display.       Patient is a 56 y.o. female with PmHX ESRD on HD, HTN, pHTN on O2, sarcoidosis, paroxysmal AFIB, s/p recent (1/11/17) BiV pacemaker implanted in anticipation of AVN RFA who presents to the ED from her dialysis unit when the techs at the center were unable to cannulate her AVG due to clotting of the access    ESRD on IHD TTS   Duration of outpatient dialysis session - 3.5 hs  Residual Mary Function - no  - Will provide dialysis for metabolic clearance and volume   - Seen and examined today during hemodialysis; tolerating treatment well without issues. 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 I thoroughly reviewed the demographic, clinical, laboratorial and imaging information available in medical records. I agree with the assessment and recommendations provided by the subspecialty resident. Dr. Jones was under my supervision.     HEMODIALYSIS NOTE  Patient evaluated while undergoing hemodialysis indicated for ESRD. Tolerating session with current UFR, no complications.

## 2017-02-07 NOTE — ANESTHESIA RELEASE NOTE
"Anesthesia Release from PACU Note    Patient: Sisi Medel    Procedure(s) Performed: Procedure(s) (LRB):  DECLOT GRAFT PERCUTANEOUS (Left)  ANGIOPLASTY-PERCUTANEOUS TRANSLUMINAL (PTA) (Left)  PLACEMENT-STENT (Left)    Anesthesia type: MAC    Post pain: Adequate analgesia    Post assessment: no apparent anesthetic complications, tolerated procedure well and no evidence of recall    Last Vitals:   Visit Vitals    /82    Pulse 85    Temp 36.5 °C (97.7 °F) (Skin)    Resp 16    Ht 5' 8" (1.727 m)    Wt 118.4 kg (261 lb 0.4 oz)    LMP  (LMP Unknown)    SpO2 100%    Breastfeeding No    BMI 39.69 kg/m2       Post vital signs: stable    Level of consciousness: awake, alert  and oriented    Nausea/Vomiting: no nausea/no vomiting    Complications: none    Airway Patency: patent    Respiratory: unassisted, spontaneous ventilation, nasal cannula    Cardiovascular: stable and blood pressure at baseline    Hydration: euvolemic  "

## 2017-02-07 NOTE — BRIEF OP NOTE
Brief Operative Note  Date: 02/07/2017    Surgeon(s) and Role:     * Torrey Villafana MD - Primary     * lAeyda Contreras MD - Resident - Assisting     * Chani Castillo MD - Fellow    Pre-op Diagnosis:  Dialysis AV fistula malfunction, subsequent encounter [T82.590D]; thrombosed LUE AV graft    Post-op Diagnosis:  Same + venous outflow stenosis  Procedure(s):  Procedure(s) (LRB):  1. Percutaneous Declot (mechanical and pharmacologic thrombectomy) of the LUE AVG  2. PTA (6x40, 7x40 [place]) of the left brachial vein & stentgraft (8x50 VIABAHN) of left brachial vein outflow  3. LUE AVG fistulogram   4. US guided access of the LUE AVG x2    Surgeon: Torrey Villafana MD  Vascular & Endovascular Surgery       Assistant: EVERARDO Castillo MD    Anesthesia: General/MAC    Findings/Key Components:  Successful treatment of thrombosed LUE AV graft, LUE outflow stenosis.    EBL: Minimal         Specimens     None          I attest to being present for the procedure and performing the case.  Torrey Villafana MD  Vascular & Endovascular Surgery

## 2017-02-08 DIAGNOSIS — G40.909 SEIZURE DISORDER: Chronic | ICD-10-CM

## 2017-02-08 LAB
ALBUMIN SERPL BCP-MCNC: 3.4 G/DL
ALDOST SERPL-MCNC: 217 NG/DL
ALP SERPL-CCNC: 106 U/L
ALT SERPL W/O P-5'-P-CCNC: 27 U/L
ANION GAP SERPL CALC-SCNC: 12 MMOL/L
AST SERPL-CCNC: 126 U/L
BASOPHILS # BLD AUTO: 0.04 K/UL
BASOPHILS NFR BLD: 0.2 %
BILIRUB SERPL-MCNC: 1.8 MG/DL
BUN SERPL-MCNC: 48 MG/DL
CALCIUM SERPL-MCNC: 9.7 MG/DL
CHLORIDE SERPL-SCNC: 99 MMOL/L
CO2 SERPL-SCNC: 22 MMOL/L
CREAT SERPL-MCNC: 6 MG/DL
DIFFERENTIAL METHOD: ABNORMAL
EOSINOPHIL # BLD AUTO: 0 K/UL
EOSINOPHIL NFR BLD: 0.2 %
ERYTHROCYTE [DISTWIDTH] IN BLOOD BY AUTOMATED COUNT: 15.3 %
EST. GFR  (AFRICAN AMERICAN): 8.4 ML/MIN/1.73 M^2
EST. GFR  (NON AFRICAN AMERICAN): 7.3 ML/MIN/1.73 M^2
GLUCOSE SERPL-MCNC: 227 MG/DL
HCT VFR BLD AUTO: 33.8 %
HGB BLD-MCNC: 10.6 G/DL
INR PPP: 1.1
LYMPHOCYTES # BLD AUTO: 2.8 K/UL
LYMPHOCYTES NFR BLD: 16.6 %
MAGNESIUM SERPL-MCNC: 2.4 MG/DL
MCH RBC QN AUTO: 31.9 PG
MCHC RBC AUTO-ENTMCNC: 31.4 %
MCV RBC AUTO: 102 FL
MONOCYTES # BLD AUTO: 1.7 K/UL
MONOCYTES NFR BLD: 9.9 %
NEUTROPHILS # BLD AUTO: 12.1 K/UL
NEUTROPHILS NFR BLD: 72.5 %
PHOSPHATE SERPL-MCNC: 5.5 MG/DL
PLATELET # BLD AUTO: 197 K/UL
PMV BLD AUTO: 10.7 FL
POTASSIUM SERPL-SCNC: 4.9 MMOL/L
PROT SERPL-MCNC: 7.7 G/DL
PROTHROMBIN TIME: 11.4 SEC
RBC # BLD AUTO: 3.32 M/UL
SODIUM SERPL-SCNC: 133 MMOL/L
WBC # BLD AUTO: 16.7 K/UL

## 2017-02-08 PROCEDURE — 99232 SBSQ HOSP IP/OBS MODERATE 35: CPT | Mod: ,,, | Performed by: INTERNAL MEDICINE

## 2017-02-08 PROCEDURE — 25000003 PHARM REV CODE 250: Performed by: INTERNAL MEDICINE

## 2017-02-08 PROCEDURE — 85610 PROTHROMBIN TIME: CPT

## 2017-02-08 PROCEDURE — 25000003 PHARM REV CODE 250: Performed by: SURGERY

## 2017-02-08 PROCEDURE — 80053 COMPREHEN METABOLIC PANEL: CPT

## 2017-02-08 PROCEDURE — 84100 ASSAY OF PHOSPHORUS: CPT

## 2017-02-08 PROCEDURE — 85025 COMPLETE CBC W/AUTO DIFF WBC: CPT

## 2017-02-08 PROCEDURE — 11000001 HC ACUTE MED/SURG PRIVATE ROOM

## 2017-02-08 PROCEDURE — 83735 ASSAY OF MAGNESIUM: CPT

## 2017-02-08 PROCEDURE — 36415 COLL VENOUS BLD VENIPUNCTURE: CPT

## 2017-02-08 PROCEDURE — 63600175 PHARM REV CODE 636 W HCPCS: Performed by: INTERNAL MEDICINE

## 2017-02-08 PROCEDURE — 25000003 PHARM REV CODE 250: Performed by: PHYSICIAN ASSISTANT

## 2017-02-08 PROCEDURE — 25000003 PHARM REV CODE 250: Performed by: STUDENT IN AN ORGANIZED HEALTH CARE EDUCATION/TRAINING PROGRAM

## 2017-02-08 RX ORDER — PREDNISONE 10 MG/1
10 TABLET ORAL DAILY
Status: DISCONTINUED | OUTPATIENT
Start: 2017-02-09 | End: 2017-02-11 | Stop reason: HOSPADM

## 2017-02-08 RX ORDER — LEVETIRACETAM 500 MG/1
TABLET ORAL
Qty: 180 TABLET | Refills: 5 | OUTPATIENT
Start: 2017-02-08

## 2017-02-08 RX ORDER — BISACODYL 5 MG
10 TABLET, DELAYED RELEASE (ENTERIC COATED) ORAL ONCE
Status: COMPLETED | OUTPATIENT
Start: 2017-02-08 | End: 2017-02-08

## 2017-02-08 RX ORDER — SODIUM CHLORIDE 9 MG/ML
INJECTION, SOLUTION INTRAVENOUS
Status: DISCONTINUED | OUTPATIENT
Start: 2017-02-09 | End: 2017-02-09

## 2017-02-08 RX ORDER — SODIUM CHLORIDE 9 MG/ML
INJECTION, SOLUTION INTRAVENOUS ONCE
Status: DISCONTINUED | OUTPATIENT
Start: 2017-02-09 | End: 2017-02-09

## 2017-02-08 RX ORDER — WARFARIN SODIUM 5 MG/1
5 TABLET ORAL DAILY
Status: DISCONTINUED | OUTPATIENT
Start: 2017-02-08 | End: 2017-02-11 | Stop reason: HOSPADM

## 2017-02-08 RX ADMIN — DICYCLOMINE HYDROCHLORIDE 20 MG: 20 TABLET ORAL at 05:02

## 2017-02-08 RX ADMIN — ATORVASTATIN CALCIUM 80 MG: 20 TABLET, FILM COATED ORAL at 09:02

## 2017-02-08 RX ADMIN — POLYETHYLENE GLYCOL 3350 17 G: 17 POWDER, FOR SOLUTION ORAL at 08:02

## 2017-02-08 RX ADMIN — BISACODYL 10 MG: 5 TABLET, COATED ORAL at 06:02

## 2017-02-08 RX ADMIN — POLYETHYLENE GLYCOL 3350 17 G: 17 POWDER, FOR SOLUTION ORAL at 11:02

## 2017-02-08 RX ADMIN — PREDNISONE 20 MG: 20 TABLET ORAL at 08:02

## 2017-02-08 RX ADMIN — HYDROCODONE BITARTRATE AND ACETAMINOPHEN 1 TABLET: 5; 325 TABLET ORAL at 08:02

## 2017-02-08 RX ADMIN — GABAPENTIN 300 MG: 300 CAPSULE ORAL at 02:02

## 2017-02-08 RX ADMIN — SODIUM CHLORIDE, PRESERVATIVE FREE 3 ML: 5 INJECTION INTRAVENOUS at 02:02

## 2017-02-08 RX ADMIN — WARFARIN SODIUM 5 MG: 5 TABLET ORAL at 11:02

## 2017-02-08 RX ADMIN — METOPROLOL TARTRATE 25 MG: 25 TABLET ORAL at 09:02

## 2017-02-08 RX ADMIN — DICYCLOMINE HYDROCHLORIDE 20 MG: 20 TABLET ORAL at 11:02

## 2017-02-08 RX ADMIN — SODIUM CHLORIDE, PRESERVATIVE FREE 3 ML: 5 INJECTION INTRAVENOUS at 05:02

## 2017-02-08 RX ADMIN — SEVELAMER CARBONATE 800 MG: 800 TABLET, FILM COATED ORAL at 08:02

## 2017-02-08 RX ADMIN — LEVETIRACETAM 500 MG: 500 TABLET, FILM COATED ORAL at 09:02

## 2017-02-08 RX ADMIN — LEVETIRACETAM 500 MG: 500 TABLET, FILM COATED ORAL at 08:02

## 2017-02-08 RX ADMIN — GABAPENTIN 300 MG: 300 CAPSULE ORAL at 05:02

## 2017-02-08 RX ADMIN — CLOPIDOGREL 75 MG: 75 TABLET, FILM COATED ORAL at 08:02

## 2017-02-08 RX ADMIN — HYDROCODONE BITARTRATE AND ACETAMINOPHEN 1 TABLET: 5; 325 TABLET ORAL at 09:02

## 2017-02-08 RX ADMIN — SEVELAMER CARBONATE 800 MG: 800 TABLET, FILM COATED ORAL at 11:02

## 2017-02-08 RX ADMIN — DICYCLOMINE HYDROCHLORIDE 20 MG: 20 TABLET ORAL at 09:02

## 2017-02-08 RX ADMIN — GABAPENTIN 300 MG: 300 CAPSULE ORAL at 09:02

## 2017-02-08 RX ADMIN — METOPROLOL TARTRATE 25 MG: 25 TABLET ORAL at 08:02

## 2017-02-08 RX ADMIN — SEVELAMER CARBONATE 800 MG: 800 TABLET, FILM COATED ORAL at 05:02

## 2017-02-08 NOTE — ASSESSMENT & PLAN NOTE
Holding anticoag for De-clot 2/7/2017  Add back lopressor for rate control now that pressures are better  Hx of tachy /mauro syndrome s/p pacemaker on Coumadin at home, plan to restart Coumadin.

## 2017-02-08 NOTE — PLAN OF CARE
"Problem: Patient Care Overview  Goal: Plan of Care Review  Outcome: Ongoing (interventions implemented as appropriate)  Rec'd 1 hour and 45 minutes of dialysis.  Tx ended 15 minutes early due to screaming with c/o of abdominal cramping and attempting to get out of the bed screaming, "I have to sit up."  Blood rinsed back.  Cramping unrelieved by rinseback.  Dr. Whitaker notified of pain and pain medication given as ordered.  Needles pulled from RAISSA graft.  Pressure held x 5 minutes.  Hemostasis achieved.  Covered with gauze and paper tape.  Net UF 1.2L.  Transported from dialysis unit to room 951 A via wheelchair by transporter.      "

## 2017-02-08 NOTE — SUBJECTIVE & OBJECTIVE
Interval History: Patient having midline abdominal cramping during HD again. No BMs charted.    Review of Systems   Constitutional: Negative for fever.   Respiratory: Negative for cough.    Cardiovascular: Negative for chest pain.   Gastrointestinal: Positive for abdominal pain and nausea.     Objective:     Vital Signs (Most Recent):  Temp: 97.5 °F (36.4 °C) (02/07/17 1633)  Pulse: 96 (02/07/17 1901)  Resp: 18 (02/07/17 1633)  BP: (!) 116/90 (02/07/17 1901)  SpO2: 100 % (02/07/17 1633) Vital Signs (24h Range):  Temp:  [97 °F (36.1 °C)-98.6 °F (37 °C)] 97.5 °F (36.4 °C)  Pulse:  [] 96  Resp:  [16-18] 18  SpO2:  [95 %-100 %] 100 %  BP: (100-148)/(7-93) 116/90     Weight: 118.4 kg (261 lb 0.4 oz)  Body mass index is 39.69 kg/(m^2).    Intake/Output Summary (Last 24 hours) at 02/07/17 1913  Last data filed at 02/07/17 1901   Gross per 24 hour   Intake              875 ml   Output             1703 ml   Net             -828 ml      Physical Exam   Constitutional: She appears well-developed.   HENT:   Right Ear: External ear normal.   Left Ear: External ear normal.   Mouth/Throat: Mucous membranes are not cyanotic.   Eyes: Conjunctivae and lids are normal. Pupils are equal.   Cardiovascular: Normal rate, regular rhythm, S1 normal and S2 normal.    Pulmonary/Chest: Effort normal and breath sounds normal. No stridor.   Abdominal: Soft. Bowel sounds are normal. There is no tenderness.   Musculoskeletal: She exhibits no edema.   Neurological: She is alert. She is not disoriented.   Skin: She is not diaphoretic. No cyanosis. Nails show no clubbing.   Psychiatric: She has a normal mood and affect. She is attentive.       Significant Labs:     CBC:   Recent Labs  Lab 02/06/17  0415 02/07/17  0420   WBC 10.45 10.78   HGB 10.3* 10.4*   HCT 33.3* 33.7*    194     CMP:   Recent Labs  Lab 02/06/17  0415 02/07/17  0420    136   K 4.6 4.2    102   CO2 25 26   * 140*   BUN 21* 29*   CREATININE 3.7* 4.6*    CALCIUM 9.4 9.6   PROT 6.8 7.1   ALBUMIN 3.0* 3.2*   BILITOT 0.5 0.6   ALKPHOS 118 114   AST 15 13   ALT 11 9*   ANIONGAP 10 8   EGFRNONAA 13.1* 10.0*     Coagulation:   Recent Labs  Lab 02/07/17  0420   INR 1.1     POCT Glucose:   Recent Labs  Lab 02/07/17  1358   POCTGLUCOSE 151*

## 2017-02-08 NOTE — PROGRESS NOTES
Progress Note  General Surgery    Admit Date: 2/4/2017  Post-operative Day: 1 Day Post-Op  Hospital Day: 5    SUBJECTIVE: NAEON. Successful declot yesterday, used graft for HD successfully afterwards.      Follow-up For:  Procedure(s) (LRB):  DECLOT GRAFT PERCUTANEOUS (Left)  ANGIOPLASTY-PERCUTANEOUS TRANSLUMINAL (PTA) (Left)  PLACEMENT-STENT (Left)    Scheduled Meds:   atorvastatin  80 mg Oral Nightly    clopidogrel  300 mg Oral Once    clopidogrel  75 mg Oral Daily    dicyclomine  20 mg Oral QID (AC & HS)    gabapentin  300 mg Oral TID    levetiracetam  500 mg Oral BID    metoprolol tartrate  25 mg Oral BID    midodrine  10 mg Oral Every Tues, Thurs, Sat    polyethylene glycol  17 g Oral BID    predniSONE  20 mg Oral Daily    sevelamer carbonate  800 mg Oral TID WM    sodium chloride 0.9%  3 mL Intravenous Q8H     Continuous Infusions:   PRN Meds:    Review of patient's allergies indicates:   Allergen Reactions    Bactrim [sulfamethoxazole-trimethoprim] Other (See Comments)     Causes renal failure    Nsaids (non-steroidal anti-inflammatory drug) Other (See Comments)     Renal failure       OBJECTIVE:     Vital Signs (Most Recent)  Temp: 97.8 °F (36.6 °C) (02/08/17 0727)  Pulse: 84 (02/08/17 0727)  Resp: 18 (02/08/17 0727)  BP: 107/67 (02/08/17 0727)  SpO2: 98 % (02/08/17 0727)    Vital Signs Range (Last 24H):  Temp:  [97 °F (36.1 °C)-97.9 °F (36.6 °C)]   Pulse:  []   Resp:  [16-22]   BP: (100-148)/(7-93)   SpO2:  [95 %-100 %]     I & O (Last 24H):    Intake/Output Summary (Last 24 hours) at 02/08/17 0834  Last data filed at 02/08/17 0600   Gross per 24 hour   Intake              875 ml   Output             1703 ml   Net             -828 ml     Physical Exam:  General: well developed, well nourished, appears stated age, no distress  Head: normocephalic, atraumatic  Eyes: PERRL, EOMI  Lungs: normal respiratory effort  Heart: + Afib  Extremities: LUE AVG with good thrill, dressings removed, no  bleeding/hematoma    Laboratory:  CBC:     Recent Labs  Lab 02/08/17  0352   WBC 16.70*   RBC 3.32*   HGB 10.6*   HCT 33.8*      *   MCH 31.9*   MCHC 31.4*     BMP:     Recent Labs  Lab 02/07/17  0420   *      K 4.2      CO2 26   BUN 29*   CREATININE 4.6*   CALCIUM 9.6     Labs within the past 24 hours have been reviewed.      ASSESSMENT/PLAN:   56 y.o.  female with a PMHx of HTN, CHF, COPD on home O2, Paroxysmal AFib s/p BiV Pacemaker, sarcoidosis on Prednisone, and ESRD on HD via LUE AVG who presented with a thrombosed LUE AVG POD#1 fistulagram with declot    -please remove temporary dialysis catheter prior to discharge  -will arrange follow up with vascular  -will sign off, please call with questions    Olivia Morrison MD  PGY-3 General Surgery  Pager: [phone]

## 2017-02-08 NOTE — SUBJECTIVE & OBJECTIVE
Interval History: Patient having midline abdominal cramping. Still no BMs. KUB pending.    Review of Systems   Constitutional: Negative for fever.   Respiratory: Negative for cough.    Cardiovascular: Negative for chest pain.   Gastrointestinal: Positive for abdominal pain and constipation.     Objective:     Vital Signs (Most Recent):  Temp: 98.5 °F (36.9 °C) (02/08/17 1548)  Pulse: 94 (02/08/17 1700)  Resp: 18 (02/08/17 1548)  BP: 123/85 (02/08/17 1548)  SpO2: 100 % (02/08/17 1548) Vital Signs (24h Range):  Temp:  [97.8 °F (36.6 °C)-98.5 °F (36.9 °C)] 98.5 °F (36.9 °C)  Pulse:  [] 94  Resp:  [18-22] 18  SpO2:  [98 %-100 %] 100 %  BP: (101-140)/(55-91) 123/85     Weight: 118.4 kg (261 lb 0.4 oz)  Body mass index is 39.69 kg/(m^2).    Intake/Output Summary (Last 24 hours) at 02/08/17 1731  Last data filed at 02/08/17 0600   Gross per 24 hour   Intake              875 ml   Output             1703 ml   Net             -828 ml      Physical Exam   Constitutional: She appears well-developed.   HENT:   Right Ear: External ear normal.   Left Ear: External ear normal.   Mouth/Throat: Mucous membranes are not cyanotic.   Eyes: Conjunctivae and lids are normal. Pupils are equal.   Cardiovascular: Normal rate, regular rhythm, S1 normal and S2 normal.    Pulmonary/Chest: Effort normal and breath sounds normal. No stridor.   Abdominal: Soft. Bowel sounds are normal. There is generalized tenderness (mild). There is no guarding.   Musculoskeletal: She exhibits no edema.   Neurological: She is alert. She is not disoriented.   Skin: She is not diaphoretic. No cyanosis. Nails show no clubbing.   Psychiatric: She has a normal mood and affect.       Significant Labs:     CBC:     Recent Labs  Lab 02/07/17  0420 02/08/17  0352   WBC 10.78 16.70*   HGB 10.4* 10.6*   HCT 33.7* 33.8*    197     CMP:     Recent Labs  Lab 02/07/17  0420 02/08/17  1202    133*   K 4.2 4.9    99   CO2 26 22*   * 227*   BUN 29*  48*   CREATININE 4.6* 6.0*   CALCIUM 9.6 9.7   PROT 7.1 7.7   ALBUMIN 3.2* 3.4*   BILITOT 0.6 1.8*   ALKPHOS 114 106   AST 13 126*   ALT 9* 27   ANIONGAP 8 12   EGFRNONAA 10.0* 7.3*     Coagulation:     Recent Labs  Lab 02/08/17  0711   INR 1.1     POCT Glucose:     Recent Labs  Lab 02/07/17  1358   POCTGLUCOSE 151*

## 2017-02-08 NOTE — PLAN OF CARE
Problem: Patient Care Overview  Goal: Plan of Care Review  Patient is alert and oriented x4, and able to make her needs known. C/o of abdominal pain and cramping at beginning of shift. Adminsitered PRN bentyl and pain medication. Pain resolved.  Ambulates independently, and continent of B&B. Gets dialysis x3 weekly, and is anuric.  Patient had L upper arm fistula declotted in AM.  On telemetry.  No falls or injuries during the night. Labs continued to remain stable.

## 2017-02-08 NOTE — ASSESSMENT & PLAN NOTE
Hx of pulmonary sarcoidosis, on home oxygen (4-5 L ), follows with .   Now on prednisone 10 mg daily at home . Now on prednisone 20 mg, plan to wean steroids to home dose 10 mg daily before discharge and follow up with Dr. Harvey.

## 2017-02-08 NOTE — PROGRESS NOTES
[facility] Hospital Medicine  Progress Note    Patient Name: Sisi Medel  MRN: [id number]  Patient Class: IP- Inpatient   Admission Date: 2/4/2017  Length of Stay: 3 days  Attending Physician: Litzy Whitaker MD  Primary Care Provider: Carlos A Caputo MD    [facility]: [facility] HOSP MED G Litzy Whitaker MD    Subjective:     Principal Problem:Hypotension    HPI:  Ms. Sisi Medel is a 56 y.o. 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  states that she went for dialysis today for HD but her AV fistula clotted and unable to perform HD and went home. She is brought by her mother because she is feeling fatigue and generally weak. She states that no attempt to de-clot AV fistula at dialysis center. Patient reports that her BP always low but not sure why, she takes midodrine 15 mg before dialysis that she took this am. She you cough, SOB, fever, chills or diarrhea. On initial evaluation patient resting comfortably on 2 L NC, BP 91/56 , HR 78 in A-fib and not in distress.     Hospital Course:  2/4/17 Patient admitted to MICU for CRRT due to hypotension.   2/5/17 started on CRRT , tolerated well.  BP improved       Interval History: Patient having midline abdominal cramping during HD again. No BMs charted.    Review of Systems   Constitutional: Negative for fever.   Respiratory: Negative for cough.    Cardiovascular: Negative for chest pain.   Gastrointestinal: Positive for abdominal pain and nausea.     Objective:     Vital Signs (Most Recent):  Temp: 97.5 °F (36.4 °C) (02/07/17 1633)  Pulse: 96 (02/07/17 1901)  Resp: 18 (02/07/17  1633)  BP: (!) 116/90 (02/07/17 1901)  SpO2: 100 % (02/07/17 1633) Vital Signs (24h Range):  Temp:  [97 °F (36.1 °C)-98.6 °F (37 °C)] 97.5 °F (36.4 °C)  Pulse:  [] 96  Resp:  [16-18] 18  SpO2:  [95 %-100 %] 100 %  BP: (100-148)/(7-93) 116/90     Weight: 118.4 kg (261 lb 0.4 oz)  Body mass index is 39.69 kg/(m^2).    Intake/Output Summary (Last 24 hours) at 02/07/17 1913  Last data filed at 02/07/17 1901   Gross per 24 hour   Intake              875 ml   Output             1703 ml   Net             -828 ml      Physical Exam   Constitutional: She appears well-developed.   HENT:   Right Ear: External ear normal.   Left Ear: External ear normal.   Mouth/Throat: Mucous membranes are not cyanotic.   Eyes: Conjunctivae and lids are normal. Pupils are equal.   Cardiovascular: Normal rate, regular rhythm, S1 normal and S2 normal.    Pulmonary/Chest: Effort normal and breath sounds normal. No stridor.   Abdominal: Soft. Bowel sounds are normal. There is no tenderness.   Musculoskeletal: She exhibits no edema.   Neurological: She is alert. She is not disoriented.   Skin: She is not diaphoretic. No cyanosis. Nails show no clubbing.   Psychiatric: She has a normal mood and affect. She is attentive.       Significant Labs:     CBC:   Recent Labs  Lab 02/06/17  0415 02/07/17  0420   WBC 10.45 10.78   HGB 10.3* 10.4*   HCT 33.3* 33.7*    194     CMP:   Recent Labs  Lab 02/06/17  0415 02/07/17  0420    136   K 4.6 4.2    102   CO2 25 26   * 140*   BUN 21* 29*   CREATININE 3.7* 4.6*   CALCIUM 9.4 9.6   PROT 6.8 7.1   ALBUMIN 3.0* 3.2*   BILITOT 0.5 0.6   ALKPHOS 118 114   AST 15 13   ALT 11 9*   ANIONGAP 10 8   EGFRNONAA 13.1* 10.0*     Coagulation:   Recent Labs  Lab 02/07/17  0420   INR 1.1     POCT Glucose:   Recent Labs  Lab 02/07/17  1358   POCTGLUCOSE 151*         Assessment/Plan:      Pulmonary sarcoidosis  Hx of pulmonary sarcoidosis, on home oxygen (4-5 L ), follows with .   Now on  prednisone 10 mg daily at home . Now on prednisone 20 mg, plan to wean steroids to home dose 10 mg daily before discharge and follow up with Dr. Harvey.    Seizure disorder  Keppra BID    Paroxysmal atrial fibrillation  Holding anticoag for De-clot 2/7/2017  Add back lopressor for rate control now that pressures are better  Hx of tachy /mauro syndrome s/p pacemaker on Coumadin at home, plan to restart Coumadin.    ESRD on hemodialysis  Clotted AV Fistula, De-clotting 2/7/2017  Nephrology following  Temporary line in place for HD    Abdominal pain / cramps, possible constipation;  Monitoring. Miralax BID until BM.    VTE Risk Mitigation     None          Litzy Whitaker MD  Department of Hospital Medicine   [facility]

## 2017-02-09 LAB
ALBUMIN SERPL BCP-MCNC: 3.2 G/DL
ANION GAP SERPL CALC-SCNC: 14 MMOL/L
BACTERIA BLD CULT: NORMAL
BACTERIA BLD CULT: NORMAL
BASOPHILS # BLD AUTO: 0.01 K/UL
BASOPHILS NFR BLD: 0.1 %
BUN SERPL-MCNC: 62 MG/DL
CALCIUM SERPL-MCNC: 9.4 MG/DL
CHLORIDE SERPL-SCNC: 98 MMOL/L
CO2 SERPL-SCNC: 20 MMOL/L
CREAT SERPL-MCNC: 7.8 MG/DL
DIFFERENTIAL METHOD: ABNORMAL
EOSINOPHIL # BLD AUTO: 0.1 K/UL
EOSINOPHIL NFR BLD: 0.3 %
ERYTHROCYTE [DISTWIDTH] IN BLOOD BY AUTOMATED COUNT: 15 %
EST. GFR  (AFRICAN AMERICAN): 6.1 ML/MIN/1.73 M^2
EST. GFR  (NON AFRICAN AMERICAN): 5.3 ML/MIN/1.73 M^2
GLUCOSE SERPL-MCNC: 117 MG/DL
HCT VFR BLD AUTO: 32.6 %
HGB BLD-MCNC: 10.3 G/DL
LYMPHOCYTES # BLD AUTO: 2.4 K/UL
LYMPHOCYTES NFR BLD: 12.1 %
MAGNESIUM SERPL-MCNC: 1.8 MG/DL
MCH RBC QN AUTO: 31.5 PG
MCHC RBC AUTO-ENTMCNC: 31.6 %
MCV RBC AUTO: 100 FL
MONOCYTES # BLD AUTO: 1.7 K/UL
MONOCYTES NFR BLD: 8.7 %
NEUTROPHILS # BLD AUTO: 15.4 K/UL
NEUTROPHILS NFR BLD: 78.4 %
PHOSPHATE SERPL-MCNC: 5.3 MG/DL
PHOSPHATE SERPL-MCNC: 5.3 MG/DL
PLATELET # BLD AUTO: 156 K/UL
PMV BLD AUTO: 10.8 FL
POTASSIUM SERPL-SCNC: 4.7 MMOL/L
RBC # BLD AUTO: 3.27 M/UL
SODIUM SERPL-SCNC: 132 MMOL/L
WBC # BLD AUTO: 19.66 K/UL

## 2017-02-09 PROCEDURE — 90935 HEMODIALYSIS ONE EVALUATION: CPT | Mod: ,,, | Performed by: INTERNAL MEDICINE

## 2017-02-09 PROCEDURE — 25000003 PHARM REV CODE 250: Performed by: HOSPITALIST

## 2017-02-09 PROCEDURE — 80100016 HC MAINTENANCE HEMODIALYSIS

## 2017-02-09 PROCEDURE — 80069 RENAL FUNCTION PANEL: CPT

## 2017-02-09 PROCEDURE — 63600175 PHARM REV CODE 636 W HCPCS: Performed by: INTERNAL MEDICINE

## 2017-02-09 PROCEDURE — 85025 COMPLETE CBC W/AUTO DIFF WBC: CPT

## 2017-02-09 PROCEDURE — 25000003 PHARM REV CODE 250: Performed by: STUDENT IN AN ORGANIZED HEALTH CARE EDUCATION/TRAINING PROGRAM

## 2017-02-09 PROCEDURE — 25000003 PHARM REV CODE 250: Performed by: INTERNAL MEDICINE

## 2017-02-09 PROCEDURE — 11000001 HC ACUTE MED/SURG PRIVATE ROOM

## 2017-02-09 PROCEDURE — 25000003 PHARM REV CODE 250: Performed by: SURGERY

## 2017-02-09 PROCEDURE — 25000003 PHARM REV CODE 250: Performed by: PHYSICIAN ASSISTANT

## 2017-02-09 PROCEDURE — 83735 ASSAY OF MAGNESIUM: CPT

## 2017-02-09 PROCEDURE — 99232 SBSQ HOSP IP/OBS MODERATE 35: CPT | Mod: ,,, | Performed by: INTERNAL MEDICINE

## 2017-02-09 RX ORDER — AMOXICILLIN 250 MG
2 CAPSULE ORAL 2 TIMES DAILY
Status: DISCONTINUED | OUTPATIENT
Start: 2017-02-09 | End: 2017-02-11 | Stop reason: HOSPADM

## 2017-02-09 RX ORDER — SODIUM CHLORIDE 9 MG/ML
INJECTION, SOLUTION INTRAVENOUS
Status: DISCONTINUED | OUTPATIENT
Start: 2017-02-09 | End: 2017-02-11

## 2017-02-09 RX ORDER — SODIUM CHLORIDE 9 MG/ML
INJECTION, SOLUTION INTRAVENOUS ONCE
Status: COMPLETED | OUTPATIENT
Start: 2017-02-09 | End: 2017-02-09

## 2017-02-09 RX ADMIN — CLOPIDOGREL 75 MG: 75 TABLET, FILM COATED ORAL at 01:02

## 2017-02-09 RX ADMIN — DICYCLOMINE HYDROCHLORIDE 20 MG: 20 TABLET ORAL at 01:02

## 2017-02-09 RX ADMIN — DICYCLOMINE HYDROCHLORIDE 20 MG: 20 TABLET ORAL at 05:02

## 2017-02-09 RX ADMIN — GABAPENTIN 300 MG: 300 CAPSULE ORAL at 01:02

## 2017-02-09 RX ADMIN — WARFARIN SODIUM 5 MG: 5 TABLET ORAL at 05:02

## 2017-02-09 RX ADMIN — PREDNISONE 10 MG: 10 TABLET ORAL at 01:02

## 2017-02-09 RX ADMIN — METOPROLOL TARTRATE 25 MG: 25 TABLET ORAL at 01:02

## 2017-02-09 RX ADMIN — SEVELAMER CARBONATE 800 MG: 800 TABLET, FILM COATED ORAL at 01:02

## 2017-02-09 RX ADMIN — ATORVASTATIN CALCIUM 80 MG: 20 TABLET, FILM COATED ORAL at 09:02

## 2017-02-09 RX ADMIN — HYDROCODONE BITARTRATE AND ACETAMINOPHEN 1 TABLET: 5; 325 TABLET ORAL at 01:02

## 2017-02-09 RX ADMIN — GABAPENTIN 300 MG: 300 CAPSULE ORAL at 05:02

## 2017-02-09 RX ADMIN — MIDODRINE HYDROCHLORIDE 10 MG: 5 TABLET ORAL at 07:02

## 2017-02-09 RX ADMIN — SEVELAMER CARBONATE 800 MG: 800 TABLET, FILM COATED ORAL at 05:02

## 2017-02-09 RX ADMIN — STANDARDIZED SENNA CONCENTRATE AND DOCUSATE SODIUM 2 TABLET: 8.6; 5 TABLET, FILM COATED ORAL at 05:02

## 2017-02-09 RX ADMIN — DICYCLOMINE HYDROCHLORIDE 20 MG: 20 TABLET ORAL at 09:02

## 2017-02-09 RX ADMIN — GABAPENTIN 300 MG: 300 CAPSULE ORAL at 09:02

## 2017-02-09 RX ADMIN — POLYETHYLENE GLYCOL 3350 17 G: 17 POWDER, FOR SOLUTION ORAL at 09:02

## 2017-02-09 RX ADMIN — LEVETIRACETAM 500 MG: 500 TABLET, FILM COATED ORAL at 09:02

## 2017-02-09 RX ADMIN — LEVETIRACETAM 500 MG: 500 TABLET, FILM COATED ORAL at 01:02

## 2017-02-09 RX ADMIN — SODIUM CHLORIDE: 0.9 INJECTION, SOLUTION INTRAVENOUS at 11:02

## 2017-02-09 NOTE — ASSESSMENT & PLAN NOTE
Hx of pulmonary sarcoidosis, on home oxygen (4-5 L ), follows with .   Taking prednisone 10 mg daily at home. Now on prednisone 20 mg, plan to wean steroids to home dose 10 mg daily before discharge and follow up with Dr. Harvey.

## 2017-02-09 NOTE — PROGRESS NOTES
3.5 HR maintenance HD treatment initiated via Lt  Upper AVF without difficulty, good a/v flows obtained.

## 2017-02-09 NOTE — PLAN OF CARE
Problem: Patient Care Overview  Goal: Plan of Care Review  Outcome: Ongoing (interventions implemented as appropriate)  3.5 hr hd completed, net fluid [id number],unable to reach target goal d/t marginal b/ps, sometimes dipping down to the 80s systollic requiring pause in ultrafiltration for most part of the treatment. Report given to Carlos RENTERIA.

## 2017-02-09 NOTE — PROGRESS NOTES
[facility] Hospital Medicine  Progress Note    Patient Name: Sisi Medel  MRN: [id number]  Patient Class: IP- Inpatient   Admission Date: 2/4/2017  Length of Stay: 4 days  Attending Physician: Litzy Whitaker MD  Primary Care Provider: Carlos A Caputo MD    [facility] Medicine Team: [facility] HOSP MED RAYMOND Whitaker MD    Subjective:     Principal Problem:Dialysis AV fistula malfunction    HPI:  Ms. Sisi Medel is a 56 y.o. 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  states that she went for dialysis today for HD but her AV fistula clotted and unable to perform HD and went home. She is brought by her mother because she is feeling fatigue and generally weak. She states that no attempt to de-clot AV fistula at dialysis center. Patient reports that her BP always low but not sure why, she takes midodrine 15 mg before dialysis that she took this am. She denies cough, SOB, fever, chills or diarrhea. On initial evaluation patient resting comfortably on 2 L NC, BP 91/56 , HR 78 in A-fib and not in distress.     Hospital Course:  2/4/17 Patient admitted to MICU for CRRT due to hypotension.   2/5/17 started on CRRT , tolerated well.  BP improved       Interval History: Patient having midline abdominal cramping. Still no BMs. KUB pending.    Review of Systems   Constitutional: Negative for fever.   Respiratory: Negative for cough.    Cardiovascular: Negative for chest pain.   Gastrointestinal: Positive for abdominal pain and constipation.     Objective:     Vital Signs (Most Recent):  Temp: 98.5 °F (36.9 °C) (02/08/17 1548)  Pulse: 94 (02/08/17  1700)  Resp: 18 (02/08/17 1548)  BP: 123/85 (02/08/17 1548)  SpO2: 100 % (02/08/17 1548) Vital Signs (24h Range):  Temp:  [97.8 °F (36.6 °C)-98.5 °F (36.9 °C)] 98.5 °F (36.9 °C)  Pulse:  [] 94  Resp:  [18-22] 18  SpO2:  [98 %-100 %] 100 %  BP: (101-140)/(55-91) 123/85     Weight: 118.4 kg (261 lb 0.4 oz)  Body mass index is 39.69 kg/(m^2).    Intake/Output Summary (Last 24 hours) at 02/08/17 1731  Last data filed at 02/08/17 0600   Gross per 24 hour   Intake              875 ml   Output             1703 ml   Net             -828 ml      Physical Exam   Constitutional: She appears well-developed.   HENT:   Right Ear: External ear normal.   Left Ear: External ear normal.   Mouth/Throat: Mucous membranes are not cyanotic.   Eyes: Conjunctivae and lids are normal. Pupils are equal.   Cardiovascular: Normal rate, regular rhythm, S1 normal and S2 normal.    Pulmonary/Chest: Effort normal and breath sounds normal. No stridor.   Abdominal: Soft. Bowel sounds are normal. There is generalized tenderness (mild). There is no guarding.   Musculoskeletal: She exhibits no edema.   Neurological: She is alert. She is not disoriented.   Skin: She is not diaphoretic. No cyanosis. Nails show no clubbing.   Psychiatric: She has a normal mood and affect.       Significant Labs:     CBC:     Recent Labs  Lab 02/07/17  0420 02/08/17  0352   WBC 10.78 16.70*   HGB 10.4* 10.6*   HCT 33.7* 33.8*    197     CMP:     Recent Labs  Lab 02/07/17  0420 02/08/17  1202    133*   K 4.2 4.9    99   CO2 26 22*   * 227*   BUN 29* 48*   CREATININE 4.6* 6.0*   CALCIUM 9.6 9.7   PROT 7.1 7.7   ALBUMIN 3.2* 3.4*   BILITOT 0.6 1.8*   ALKPHOS 114 106   AST 13 126*   ALT 9* 27   ANIONGAP 8 12   EGFRNONAA 10.0* 7.3*     Coagulation:     Recent Labs  Lab 02/08/17  0711   INR 1.1     POCT Glucose:     Recent Labs  Lab 02/07/17  1358   POCTGLUCOSE 151*         Assessment/Plan:      * Dialysis AV fistula malfunction  Patient underwent  Declot 2/7/2017; successful HD.    Pulmonary sarcoidosis  Hx of pulmonary sarcoidosis, on home oxygen (4-5 L ), follows with .   Taking prednisone 10 mg daily at home. Now on prednisone 20 mg, plan to wean steroids to home dose 10 mg daily before discharge and follow up with Dr. Harvey.    Seizure disorder  Keppra BID    Intermittent generalized abdominal pain  Possible constipation; KUB pending.  Trial of Miralax and bisacodyl.    Paroxysmal atrial fibrillation  Held anticoag for De-clot 2/7/2017  Restarted Lopressor for rate control now that blood pressures are better  Hx of tachy /mauro syndrome s/p pacemaker on Coumadin at home, plan to restart Coumadin.    ESRD on hemodialysis  Clotted AV Fistula, De-clotting 2/7/2017  Nephrology following  Temporary line in place for HD to be removed now that AVG is functioning.    VTE Risk Mitigation     None          Litzy Whitaker MD  Department of Hospital Medicine   [facility]

## 2017-02-09 NOTE — ASSESSMENT & PLAN NOTE
Held anticoag for De-clot 2/7/2017  Restarted Lopressor for rate control now that blood pressures are better  Hx of tachy /mauro syndrome s/p pacemaker on Coumadin at home, plan to restart Coumadin.

## 2017-02-09 NOTE — PROGRESS NOTES
I personally saw and examined the patient on hemodialysis, tolerating treatment, see hemodyalisis flowsheet. I also reviewed the chart and current medication. The dialysis bath was adjusted.

## 2017-02-10 LAB
BASOPHILS # BLD AUTO: 0 K/UL
BASOPHILS NFR BLD: 0 %
DIFFERENTIAL METHOD: ABNORMAL
EOSINOPHIL # BLD AUTO: 0.1 K/UL
EOSINOPHIL NFR BLD: 0.4 %
ERYTHROCYTE [DISTWIDTH] IN BLOOD BY AUTOMATED COUNT: 15.5 %
HCT VFR BLD AUTO: 29.6 %
HGB BLD-MCNC: 9.3 G/DL
LYMPHOCYTES # BLD AUTO: 1 K/UL
LYMPHOCYTES NFR BLD: 6.3 %
MCH RBC QN AUTO: 31.1 PG
MCHC RBC AUTO-ENTMCNC: 31.4 %
MCV RBC AUTO: 99 FL
MONOCYTES # BLD AUTO: 1.9 K/UL
MONOCYTES NFR BLD: 12.3 %
NEUTROPHILS # BLD AUTO: 12.6 K/UL
NEUTROPHILS NFR BLD: 80.7 %
PLATELET # BLD AUTO: 128 K/UL
PMV BLD AUTO: 10.6 FL
RBC # BLD AUTO: 2.99 M/UL
RENIN PLAS-CCNC: 18 NG/ML/H
WBC # BLD AUTO: 15.57 K/UL

## 2017-02-10 PROCEDURE — 25000003 PHARM REV CODE 250: Performed by: INTERNAL MEDICINE

## 2017-02-10 PROCEDURE — 36415 COLL VENOUS BLD VENIPUNCTURE: CPT

## 2017-02-10 PROCEDURE — 25000003 PHARM REV CODE 250: Performed by: PHYSICIAN ASSISTANT

## 2017-02-10 PROCEDURE — 87040 BLOOD CULTURE FOR BACTERIA: CPT | Mod: 59

## 2017-02-10 PROCEDURE — 63600175 PHARM REV CODE 636 W HCPCS: Performed by: INTERNAL MEDICINE

## 2017-02-10 PROCEDURE — 11000001 HC ACUTE MED/SURG PRIVATE ROOM

## 2017-02-10 PROCEDURE — 25000003 PHARM REV CODE 250: Performed by: STUDENT IN AN ORGANIZED HEALTH CARE EDUCATION/TRAINING PROGRAM

## 2017-02-10 PROCEDURE — 85025 COMPLETE CBC W/AUTO DIFF WBC: CPT

## 2017-02-10 PROCEDURE — 25000003 PHARM REV CODE 250: Performed by: SURGERY

## 2017-02-10 PROCEDURE — 99232 SBSQ HOSP IP/OBS MODERATE 35: CPT | Mod: ,,, | Performed by: INTERNAL MEDICINE

## 2017-02-10 RX ORDER — DICYCLOMINE HYDROCHLORIDE 20 MG/1
20 TABLET ORAL
Status: DISCONTINUED | OUTPATIENT
Start: 2017-02-10 | End: 2017-02-11 | Stop reason: HOSPADM

## 2017-02-10 RX ORDER — HYDROCODONE BITARTRATE AND ACETAMINOPHEN 5; 325 MG/1; MG/1
1 TABLET ORAL EVERY 6 HOURS PRN
Status: DISCONTINUED | OUTPATIENT
Start: 2017-02-10 | End: 2017-02-11 | Stop reason: HOSPADM

## 2017-02-10 RX ORDER — BISACODYL 10 MG
10 SUPPOSITORY, RECTAL RECTAL DAILY
Status: DISCONTINUED | OUTPATIENT
Start: 2017-02-10 | End: 2017-02-11 | Stop reason: HOSPADM

## 2017-02-10 RX ADMIN — SEVELAMER CARBONATE 800 MG: 800 TABLET, FILM COATED ORAL at 08:02

## 2017-02-10 RX ADMIN — CLOPIDOGREL 75 MG: 75 TABLET, FILM COATED ORAL at 08:02

## 2017-02-10 RX ADMIN — PREDNISONE 10 MG: 10 TABLET ORAL at 08:02

## 2017-02-10 RX ADMIN — GABAPENTIN 300 MG: 300 CAPSULE ORAL at 09:02

## 2017-02-10 RX ADMIN — ATORVASTATIN CALCIUM 80 MG: 20 TABLET, FILM COATED ORAL at 09:02

## 2017-02-10 RX ADMIN — SEVELAMER CARBONATE 800 MG: 800 TABLET, FILM COATED ORAL at 12:02

## 2017-02-10 RX ADMIN — DICYCLOMINE HYDROCHLORIDE 20 MG: 20 TABLET ORAL at 09:02

## 2017-02-10 RX ADMIN — SODIUM CHLORIDE, PRESERVATIVE FREE 3 ML: 5 INJECTION INTRAVENOUS at 09:02

## 2017-02-10 RX ADMIN — DICYCLOMINE HYDROCHLORIDE 20 MG: 20 TABLET ORAL at 05:02

## 2017-02-10 RX ADMIN — BISACODYL 10 MG: 10 SUPPOSITORY RECTAL at 06:02

## 2017-02-10 RX ADMIN — STANDARDIZED SENNA CONCENTRATE AND DOCUSATE SODIUM 2 TABLET: 8.6; 5 TABLET, FILM COATED ORAL at 08:02

## 2017-02-10 RX ADMIN — WARFARIN SODIUM 5 MG: 5 TABLET ORAL at 05:02

## 2017-02-10 RX ADMIN — LEVETIRACETAM 500 MG: 500 TABLET, FILM COATED ORAL at 08:02

## 2017-02-10 RX ADMIN — LEVETIRACETAM 500 MG: 500 TABLET, FILM COATED ORAL at 09:02

## 2017-02-10 RX ADMIN — METOPROLOL TARTRATE 25 MG: 25 TABLET ORAL at 12:02

## 2017-02-10 RX ADMIN — GABAPENTIN 300 MG: 300 CAPSULE ORAL at 05:02

## 2017-02-10 RX ADMIN — GABAPENTIN 300 MG: 300 CAPSULE ORAL at 01:02

## 2017-02-10 RX ADMIN — POLYETHYLENE GLYCOL 3350 17 G: 17 POWDER, FOR SOLUTION ORAL at 09:02

## 2017-02-10 RX ADMIN — STANDARDIZED SENNA CONCENTRATE AND DOCUSATE SODIUM 2 TABLET: 8.6; 5 TABLET, FILM COATED ORAL at 09:02

## 2017-02-10 RX ADMIN — SEVELAMER CARBONATE 800 MG: 800 TABLET, FILM COATED ORAL at 05:02

## 2017-02-10 NOTE — PROGRESS NOTES
[facility] Hospital Medicine  Progress Note    Patient Name: Sisi Medel  MRN: [id number]  Patient Class: IP- Inpatient   Admission Date: 2/4/2017  Length of Stay: 5 days  Attending Physician: Litzy Whitaker MD  Primary Care Provider: Carlos A Caputo MD    [facility] Medicine Team: [facility] RAYMOND Whitaker MD    Subjective:     Principal Problem:Dialysis AV fistula malfunction    HPI:  Ms. Sisi Medel is a 56 y.o. 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  states that she went for dialysis today for HD but her AV fistula clotted and unable to perform HD and went home. She is brought by her mother because she is feeling fatigue and generally weak. She states that no attempt to de-clot AV fistula at dialysis center. Patient reports that her BP always low but not sure why, she takes midodrine 15 mg before dialysis that she took this am. She you cough, SOB, fever, chills or diarrhea. On initial evaluation patient resting comfortably on 2 L NC, BP 91/56 , HR 78 in A-fib and not in distress.     Hospital Course:  2/4/17 Patient admitted to MICU for CRRT due to hypotension.   2/5/17 started on CRRT , tolerated well.  BP improved       Interval History: Patient having less abdominal cramping. Still no BMs. KUB unremarkable.    Review of Systems   Constitutional: Negative for fever.   Respiratory: Negative for cough.    Cardiovascular: Negative for chest pain.   Gastrointestinal: Positive for constipation.     Objective:     Vital Signs (Most Recent):  Temp: 98.4 °F (36.9 °C) (02/09/17 1546)  Pulse: 93 (02/09/17 1700)  Resp: 18 (02/09/17  1546)  BP: 118/72 (02/09/17 1546)  SpO2: 100 % (02/09/17 1604) Vital Signs (24h Range):  Temp:  [97.8 °F (36.6 °C)-98.8 °F (37.1 °C)] 98.4 °F (36.9 °C)  Pulse:  [] 93  Resp:  [18] 18  SpO2:  [97 %-100 %] 100 %  BP: ()/(45-89) 118/72     Weight: 118.4 kg (261 lb 0.4 oz)  Body mass index is 39.69 kg/(m^2).    Intake/Output Summary (Last 24 hours) at 02/09/17 1838  Last data filed at 02/09/17 1200   Gross per 24 hour   Intake              890 ml   Output              914 ml   Net              -24 ml      Physical Exam   Constitutional: She appears well-developed.   HENT:   Right Ear: External ear normal.   Left Ear: External ear normal.   Mouth/Throat: Mucous membranes are not cyanotic.   Eyes: Conjunctivae and lids are normal. Pupils are equal.   Cardiovascular: Normal rate, regular rhythm, S1 normal and S2 normal.    Pulmonary/Chest: Effort normal and breath sounds normal. No stridor.   Abdominal: Soft. Bowel sounds are normal. There is generalized tenderness (mild). There is no guarding.   Musculoskeletal: She exhibits no edema.   Neurological: She is alert. She is not disoriented.   Skin: She is not diaphoretic. No cyanosis. Nails show no clubbing.   Psychiatric: She has a normal mood and affect.       Significant Labs:     CBC:     Recent Labs  Lab 02/08/17  0352 02/09/17  0300   WBC 16.70* 19.66*   HGB 10.6* 10.3*   HCT 33.8* 32.6*    156     CMP:     Recent Labs  Lab 02/08/17  1202 02/09/17  0859   * 132*   K 4.9 4.7   CL 99 98   CO2 22* 20*   * 117*   BUN 48* 62*   CREATININE 6.0* 7.8*   CALCIUM 9.7 9.4   PROT 7.7  --    ALBUMIN 3.4* 3.2*   BILITOT 1.8*  --    ALKPHOS 106  --    *  --    ALT 27  --    ANIONGAP 12 14   EGFRNONAA 7.3* 5.3*     Coagulation:     Recent Labs  Lab 02/08/17  0711   INR 1.1     Assessment/Plan:      * Dialysis AV fistula malfunction  Patient underwent Declot 2/7/2017; successful HD.    Pulmonary sarcoidosis  Hx of pulmonary sarcoidosis, on home  oxygen (4-5 L ), follows with .   Taking prednisone 10 mg daily at home. Now on prednisone 20 mg, plan to wean steroids to home dose 10 mg daily before discharge and follow up with Dr. Harvey.  Resumed prednisone 10 mg 2/9/2017.    Seizure disorder  Keppra BID    Intermittent generalized abdominal pain  Possible constipation; KUB without pathology.  Trial of Miralax and bisacodyl for constipation but no BM. Bentyl started for cramping.    Paroxysmal atrial fibrillation  Held anticoag for De-clot 2/7/2017  Restarted Lopressor for rate control now that blood pressures are better  Hx of tachy /mauro syndrome s/p pacemaker on Coumadin at home, restarted Coumadin 2/8/17.    ESRD on hemodialysis  Clotted AV Fistula, De-clotting 2/7/2017  Nephrology following  Temporary line in place for HD to be removed now that AVG is functioning.    Hypotension  BP remains intermittently low.  Prednisone started over hydrocortisone which was discontinued; WBC elevated afterwards. Patient afebrile.    VTE Risk Mitigation     None          Litzy Whitaker MD  Department of Hospital Medicine   [facility]

## 2017-02-10 NOTE — SUBJECTIVE & OBJECTIVE
Interval History: Patient having less abdominal cramping. Still no BMs. KUB unremarkable.    Review of Systems   Constitutional: Negative for fever.   Respiratory: Negative for cough.    Cardiovascular: Negative for chest pain.   Gastrointestinal: Positive for constipation.     Objective:     Vital Signs (Most Recent):  Temp: 98.4 °F (36.9 °C) (02/09/17 1546)  Pulse: 93 (02/09/17 1700)  Resp: 18 (02/09/17 1546)  BP: 118/72 (02/09/17 1546)  SpO2: 100 % (02/09/17 1604) Vital Signs (24h Range):  Temp:  [97.8 °F (36.6 °C)-98.8 °F (37.1 °C)] 98.4 °F (36.9 °C)  Pulse:  [] 93  Resp:  [18] 18  SpO2:  [97 %-100 %] 100 %  BP: ()/(45-89) 118/72     Weight: 118.4 kg (261 lb 0.4 oz)  Body mass index is 39.69 kg/(m^2).    Intake/Output Summary (Last 24 hours) at 02/09/17 1838  Last data filed at 02/09/17 1200   Gross per 24 hour   Intake              890 ml   Output              914 ml   Net              -24 ml      Physical Exam   Constitutional: She appears well-developed.   HENT:   Right Ear: External ear normal.   Left Ear: External ear normal.   Mouth/Throat: Mucous membranes are not cyanotic.   Eyes: Conjunctivae and lids are normal. Pupils are equal.   Cardiovascular: Normal rate, regular rhythm, S1 normal and S2 normal.    Pulmonary/Chest: Effort normal and breath sounds normal. No stridor.   Abdominal: Soft. Bowel sounds are normal. There is generalized tenderness (mild). There is no guarding.   Musculoskeletal: She exhibits no edema.   Neurological: She is alert. She is not disoriented.   Skin: She is not diaphoretic. No cyanosis. Nails show no clubbing.   Psychiatric: She has a normal mood and affect.       Significant Labs:     CBC:     Recent Labs  Lab 02/08/17  0352 02/09/17  0300   WBC 16.70* 19.66*   HGB 10.6* 10.3*   HCT 33.8* 32.6*    156     CMP:     Recent Labs  Lab 02/08/17  1202 02/09/17  0859   * 132*   K 4.9 4.7   CL 99 98   CO2 22* 20*   * 117*   BUN 48* 62*   CREATININE  6.0* 7.8*   CALCIUM 9.7 9.4   PROT 7.7  --    ALBUMIN 3.4* 3.2*   BILITOT 1.8*  --    ALKPHOS 106  --    *  --    ALT 27  --    ANIONGAP 12 14   EGFRNONAA 7.3* 5.3*     Coagulation:     Recent Labs  Lab 02/08/17  0711   INR 1.1

## 2017-02-10 NOTE — ASSESSMENT & PLAN NOTE
Possible constipation; KUB without pathology.  Trial of Miralax and bisacodyl for constipation but no BM. Bentyl started for cramping.

## 2017-02-10 NOTE — ASSESSMENT & PLAN NOTE
Clotted AV Fistula, De-clotting 2/7/2017  Nephrology following  Temporary line in place for HD to be removed now that AVG is functioning.

## 2017-02-10 NOTE — ASSESSMENT & PLAN NOTE
Hx of pulmonary sarcoidosis, on home oxygen (4-5 L ), follows with .   Taking prednisone 10 mg daily at home. Now on prednisone 20 mg, plan to wean steroids to home dose 10 mg daily before discharge and follow up with Dr. Harvey.  Resumed prednisone 10 mg 2/9/2017.

## 2017-02-10 NOTE — PROGRESS NOTES
Patient's BP 80/50, asymptomatic.  Izaguirre notified.  Instructed to monitor BP and notify if patient becomes obtunded.

## 2017-02-10 NOTE — PROGRESS NOTES
Pt's HR sustaining 150-160s BP 98/60. Pt asymptomatic. Dr. Whitaker notified and ordered to give AM lopressor. Will continue to administer.

## 2017-02-10 NOTE — ASSESSMENT & PLAN NOTE
Held anticoag for De-clot 2/7/2017  Restarted Lopressor for rate control now that blood pressures are better  Hx of tachy /mauro syndrome s/p pacemaker on Coumadin at home, restarted Coumadin 2/8/17.

## 2017-02-10 NOTE — ASSESSMENT & PLAN NOTE
BP remains intermittently low.  Prednisone started over hydrocortisone which was discontinued; WBC elevated afterwards. Patient afebrile.

## 2017-02-10 NOTE — SUBJECTIVE & OBJECTIVE
Interval History: Patient having constipation, less abdominal discomfort however. WBC trending down slightly.    Review of Systems   Constitutional: Negative for fever.   Respiratory: Negative for cough.    Cardiovascular: Negative for chest pain.   Gastrointestinal: Positive for constipation.     Objective:     Vital Signs (Most Recent):  Temp: 98.2 °F (36.8 °C) (02/10/17 1542)  Pulse: 91 (02/10/17 1657)  Resp: 18 (02/10/17 1542)  BP: (!) 82/52 (02/10/17 1542)  SpO2: 98 % (02/10/17 1542) Vital Signs (24h Range):  Temp:  [97.8 °F (36.6 °C)-98.8 °F (37.1 °C)] 98.2 °F (36.8 °C)  Pulse:  [] 91  Resp:  [18] 18  SpO2:  [97 %-100 %] 98 %  BP: (75-98)/(47-60) 82/52     Weight: 118.4 kg (261 lb 0.4 oz)  Body mass index is 39.69 kg/(m^2).    Intake/Output Summary (Last 24 hours) at 02/10/17 1743  Last data filed at 02/10/17 0500   Gross per 24 hour   Intake              100 ml   Output                0 ml   Net              100 ml      Physical Exam   Constitutional: She appears well-developed.   HENT:   Right Ear: External ear normal.   Left Ear: External ear normal.   Mouth/Throat: Mucous membranes are not cyanotic.   Eyes: Conjunctivae and lids are normal. Pupils are equal.   Cardiovascular: Normal rate, regular rhythm, S1 normal and S2 normal.    Pulmonary/Chest: Effort normal and breath sounds normal. No stridor.   Abdominal: Soft. Bowel sounds are normal. There is generalized tenderness (mild). There is no guarding.   Musculoskeletal: She exhibits no edema.   Neurological: She is alert. She is not disoriented.   Skin: She is not diaphoretic. No cyanosis. Nails show no clubbing.   Psychiatric: She has a normal mood and affect.       Significant Labs:     CBC:     Recent Labs  Lab 02/09/17  0300 02/10/17  0315   WBC 19.66* 15.57*   HGB 10.3* 9.3*   HCT 32.6* 29.6*    128*     CMP:     Recent Labs  Lab 02/09/17  0859   *   K 4.7   CL 98   CO2 20*   *   BUN 62*   CREATININE 7.8*   CALCIUM 9.4    ALBUMIN 3.2*   ANIONGAP 14   EGFRNONAA 5.3*

## 2017-02-11 VITALS
DIASTOLIC BLOOD PRESSURE: 48 MMHG | HEIGHT: 68 IN | RESPIRATION RATE: 18 BRPM | SYSTOLIC BLOOD PRESSURE: 84 MMHG | WEIGHT: 261 LBS | OXYGEN SATURATION: 96 % | TEMPERATURE: 99 F | HEART RATE: 100 BPM | BODY MASS INDEX: 39.56 KG/M2

## 2017-02-11 LAB
ALBUMIN SERPL BCP-MCNC: 2.7 G/DL
ANION GAP SERPL CALC-SCNC: 12 MMOL/L
BASOPHILS # BLD AUTO: 0 K/UL
BASOPHILS NFR BLD: 0 %
BUN SERPL-MCNC: 52 MG/DL
CALCIUM SERPL-MCNC: 8.8 MG/DL
CHLORIDE SERPL-SCNC: 95 MMOL/L
CO2 SERPL-SCNC: 25 MMOL/L
CREAT SERPL-MCNC: 7.7 MG/DL
DIFFERENTIAL METHOD: ABNORMAL
EOSINOPHIL # BLD AUTO: 0.1 K/UL
EOSINOPHIL NFR BLD: 0.7 %
ERYTHROCYTE [DISTWIDTH] IN BLOOD BY AUTOMATED COUNT: 15.2 %
EST. GFR  (AFRICAN AMERICAN): 6.2 ML/MIN/1.73 M^2
EST. GFR  (NON AFRICAN AMERICAN): 5.4 ML/MIN/1.73 M^2
GLUCOSE SERPL-MCNC: 134 MG/DL
HCT VFR BLD AUTO: 27.7 %
HGB BLD-MCNC: 8.7 G/DL
INR PPP: 1.4
LYMPHOCYTES # BLD AUTO: 1.4 K/UL
LYMPHOCYTES NFR BLD: 9.1 %
MCH RBC QN AUTO: 31.1 PG
MCHC RBC AUTO-ENTMCNC: 31.4 %
MCV RBC AUTO: 99 FL
MONOCYTES # BLD AUTO: 1.7 K/UL
MONOCYTES NFR BLD: 11.3 %
NEUTROPHILS # BLD AUTO: 11.7 K/UL
NEUTROPHILS NFR BLD: 78.5 %
PHOSPHATE SERPL-MCNC: 5 MG/DL
PLATELET # BLD AUTO: 130 K/UL
PMV BLD AUTO: 11.4 FL
POTASSIUM SERPL-SCNC: 4.2 MMOL/L
PROTHROMBIN TIME: 14.5 SEC
RBC # BLD AUTO: 2.8 M/UL
SODIUM SERPL-SCNC: 132 MMOL/L
WBC # BLD AUTO: 14.85 K/UL

## 2017-02-11 PROCEDURE — 80069 RENAL FUNCTION PANEL: CPT

## 2017-02-11 PROCEDURE — 85025 COMPLETE CBC W/AUTO DIFF WBC: CPT

## 2017-02-11 PROCEDURE — 25000003 PHARM REV CODE 250: Performed by: SURGERY

## 2017-02-11 PROCEDURE — 90935 HEMODIALYSIS ONE EVALUATION: CPT | Mod: GC,,, | Performed by: INTERNAL MEDICINE

## 2017-02-11 PROCEDURE — 85610 PROTHROMBIN TIME: CPT

## 2017-02-11 PROCEDURE — 25000003 PHARM REV CODE 250: Performed by: INTERNAL MEDICINE

## 2017-02-11 PROCEDURE — 25000003 PHARM REV CODE 250: Performed by: STUDENT IN AN ORGANIZED HEALTH CARE EDUCATION/TRAINING PROGRAM

## 2017-02-11 PROCEDURE — 80100016 HC MAINTENANCE HEMODIALYSIS

## 2017-02-11 PROCEDURE — 99239 HOSP IP/OBS DSCHRG MGMT >30: CPT | Mod: ,,, | Performed by: INTERNAL MEDICINE

## 2017-02-11 PROCEDURE — 25000003 PHARM REV CODE 250: Performed by: HOSPITALIST

## 2017-02-11 PROCEDURE — 63600175 PHARM REV CODE 636 W HCPCS: Performed by: INTERNAL MEDICINE

## 2017-02-11 RX ORDER — METOPROLOL TARTRATE 25 MG/1
12.5 TABLET, FILM COATED ORAL 2 TIMES DAILY
Qty: 60 TABLET | Refills: 11
Start: 2017-02-11 | End: 2017-02-20

## 2017-02-11 RX ORDER — ATORVASTATIN CALCIUM 80 MG/1
80 TABLET, FILM COATED ORAL NIGHTLY
Qty: 90 TABLET | Refills: 1
Start: 2017-02-11 | End: 2017-04-03 | Stop reason: SDUPTHER

## 2017-02-11 RX ORDER — SODIUM CHLORIDE 9 MG/ML
INJECTION, SOLUTION INTRAVENOUS ONCE
Status: COMPLETED | OUTPATIENT
Start: 2017-02-11 | End: 2017-02-11

## 2017-02-11 RX ORDER — MIDODRINE HYDROCHLORIDE 5 MG/1
15 TABLET ORAL
Status: DISCONTINUED | OUTPATIENT
Start: 2017-02-11 | End: 2017-02-11 | Stop reason: HOSPADM

## 2017-02-11 RX ORDER — POLYETHYLENE GLYCOL 3350 17 G/17G
17 POWDER, FOR SOLUTION ORAL DAILY
Qty: 510 G | Refills: 0 | Status: SHIPPED | OUTPATIENT
Start: 2017-02-11 | End: 2019-01-01

## 2017-02-11 RX ORDER — ALBUMIN HUMAN 250 G/1000ML
25 SOLUTION INTRAVENOUS
Status: DISCONTINUED | OUTPATIENT
Start: 2017-02-11 | End: 2017-02-11 | Stop reason: HOSPADM

## 2017-02-11 RX ORDER — MIDODRINE HYDROCHLORIDE 5 MG/1
15 TABLET ORAL
Status: ON HOLD
Start: 2017-02-11 | End: 2017-02-19

## 2017-02-11 RX ORDER — SODIUM CHLORIDE 9 MG/ML
INJECTION, SOLUTION INTRAVENOUS
Status: DISCONTINUED | OUTPATIENT
Start: 2017-02-11 | End: 2017-02-11 | Stop reason: HOSPADM

## 2017-02-11 RX ORDER — CLOPIDOGREL BISULFATE 75 MG/1
75 TABLET ORAL DAILY
Qty: 30 TABLET | Refills: 11 | Status: ON HOLD | OUTPATIENT
Start: 2017-02-11 | End: 2017-04-23 | Stop reason: HOSPADM

## 2017-02-11 RX ORDER — MIDODRINE HYDROCHLORIDE 5 MG/1
15 TABLET ORAL
Status: DISCONTINUED | OUTPATIENT
Start: 2017-02-11 | End: 2017-02-11

## 2017-02-11 RX ADMIN — LEVETIRACETAM 500 MG: 500 TABLET, FILM COATED ORAL at 01:02

## 2017-02-11 RX ADMIN — MIDODRINE HYDROCHLORIDE 15 MG: 5 TABLET ORAL at 08:02

## 2017-02-11 RX ADMIN — GABAPENTIN 300 MG: 300 CAPSULE ORAL at 05:02

## 2017-02-11 RX ADMIN — SODIUM CHLORIDE 300 ML: 0.9 INJECTION, SOLUTION INTRAVENOUS at 12:02

## 2017-02-11 RX ADMIN — SODIUM CHLORIDE, PRESERVATIVE FREE 3 ML: 5 INJECTION INTRAVENOUS at 02:02

## 2017-02-11 RX ADMIN — SEVELAMER CARBONATE 800 MG: 800 TABLET, FILM COATED ORAL at 01:02

## 2017-02-11 RX ADMIN — CLOPIDOGREL 75 MG: 75 TABLET, FILM COATED ORAL at 01:02

## 2017-02-11 RX ADMIN — GABAPENTIN 300 MG: 300 CAPSULE ORAL at 02:02

## 2017-02-11 RX ADMIN — HYDROCODONE BITARTRATE AND ACETAMINOPHEN 1 TABLET: 5; 325 TABLET ORAL at 12:02

## 2017-02-11 RX ADMIN — STANDARDIZED SENNA CONCENTRATE AND DOCUSATE SODIUM 2 TABLET: 8.6; 5 TABLET, FILM COATED ORAL at 01:02

## 2017-02-11 RX ADMIN — PREDNISONE 10 MG: 10 TABLET ORAL at 01:02

## 2017-02-11 NOTE — PLAN OF CARE
Problem: Patient Care Overview  Goal: Plan of Care Review  Outcome: Ongoing (interventions implemented as appropriate)  3.5 hr hd completed, net fluid [id number], target goal achieved, pt tolerated well. Report given to Jose Armando RENTERIA.

## 2017-02-11 NOTE — DISCHARGE SUMMARY
"[facility] Hospital Medicine  Discharge Summary      Patient Name: Sisi Medel  MRN: [id number]  Admission Date: 2/4/2017  Hospital Length of Stay: 7 days  Discharge Date and Time: 2/11/2017  6:35 PM  Attending Physician: Litzy Whitaker MD   Discharging Provider: Litzy Whitaker MD  Primary Care Provider: Carlos A Caputo MD    [facility] Medicine Team: [facility] MED  Litzy Whitaker MD    HPI: "56 y.o. female with history of Pulmonary sarcoidosis on home oxygen and prednisone, CHF ,paroxysmal AFIB, s/p recent (1/11/17) BiV pacemaker implanted in anticipation of AVN RFA, seizure disorder on Keppra, ESRD on HD TTS ( for the past year, anuric), presumptive adrenal insufficiency (on midodrine, Fludrocortisone) presented due to weakness and clotted AV fistula. She went for dialysis center today for HD but her AV fistula was clotted and they unable to perform HD so she went home. She was brought to ED by her mother because she is feeling fatigue and generally weak. She states that no attempt to de-clot AV fistula was made at dialysis center. Patient reports that her BP is always low but not sure why, she takes midodrine 15 mg before dialysis; she took it this am. She denies cough, SOB, fever, chills or diarrhea."       Procedure(s) (LRB):  DECLOT GRAFT PERCUTANEOUS (Left)  ANGIOPLASTY-PERCUTANEOUS TRANSLUMINAL (PTA) (Left)  PLACEMENT-STENT (Left)      Indwelling Lines/Drains at time of discharge:   Lines/Drains/Airways     Central Venous Catheter Line                 Hemodialysis Catheter 02/04/17 1315 right internal jugular -- REMOVED 7 days          Drain                 Hemodialysis AV Graft Left upper arm -- days              Hospital Course:     Dialysis AV fistula malfunction  Patient was initially admitted to MICU for CRRT due to hypotension. Tolerated CRRT well. BP improved. Patient underwent Declot 2/7/2017; successful HD afterwards. Plavix started.  HD planned for " 02/11/17     Pulmonary sarcoidosis  Hx of pulmonary sarcoidosis, on home oxygen (4-5 L ), follows with .   Taking prednisone 10 mg daily at home. In hospital on prednisone 20 mg, weaned to home dose 10 mg daily before discharge and plan to follow up with Dr. Harvey.  Resumed prednisone 10 mg 2/9/2017.  BP has been low at times.     Seizure disorder  Continued Keppra BID     Intermittent generalized abdominal pain  Possible constipation; KUB without pathology.  Trial of Miralax and bisacodyl for constipation but no BM. Bentyl started for cramping.  Continues to have constipation; increasing bowel regimen. Recommend FiberChoice and Miralax daily.     Paroxysmal atrial fibrillation  Held anticoag for De-clot 2/7/2017  Restarted Lopressor for rate control now that blood pressures are better.  Hx of tachy/mauro syndrome s/p pacemaker, on Coumadin at home, restarted Coumadin 2/8/17.     ESRD on hemodialysis  Clotted AV Fistula, De-clotting 2/7/2017  Nephrology followed  Temporary line in place for HD removed now that AVG is functioning.     Chronic combined systolic and diastolic heart failure  Appears compensated.     Current chronic use of systemic steroids  Endocrine was consulted: Continue prednisone 10 mg daily and discharge on this dose  No further need for Florinef     Hypotension  She was treated with stress dose hydrocortisone in ICU.  BP remains intermittently low.  Prednisone started over hydrocortisone which was discontinued.   WBC elevated after Prednisone increased to 20 mg daily. Patient afebrile.       Consults:   Consults         Status Ordering Provider     Inpatient consult to Endocrinology  Once     Provider:  (Not yet assigned)    Completed LOLA HILLS     Inpatient consult to Intensivist (Critical Care)  Once     Provider:  (Not yet assigned)    Final result GORAN PATEL     Inpatient consult to Nephrology  Once     Provider:  (Not yet assigned)    Completed GORAN PATEL      Inpatient consult to Vascular Surgery  Once     Provider:  (Not yet assigned)    Final result MIKEL AC          Significant Diagnostic Studies:  EF   Date Value Ref Range Status   06/27/2016 65 55 - 65    06/26/2016 50 55 - 65      Hemoglobin A1C   Date Value Ref Range Status   05/17/2016 5.7 4.5 - 6.2 % Final   10/15/2015 6.7 (H) 4.5 - 6.2 % Final     CBC:   Recent Labs  Lab 02/11/17  0326   WBC 14.85*   RBC 2.80*   HGB 8.7*   HCT 27.7*   *   MCV 99*   MCH 31.1*   MCHC 31.4*     CMP:   Recent Labs  Lab 02/08/17  1202  02/11/17  0326   *  < > 134*   CALCIUM 9.7  < > 8.8   ALBUMIN 3.4*  < > 2.7*   PROT 7.7  --   --    *  < > 132*   K 4.9  < > 4.2   CO2 22*  < > 25   CL 99  < > 95   BUN 48*  < > 52*   CREATININE 6.0*  < > 7.7*   ALKPHOS 106  --   --    ALT 27  --   --    *  --   --    BILITOT 1.8*  --   --    < > = values in this interval not displayed.     Coagulation:   Recent Labs  Lab 02/11/17 0326   INR 1.4*     Microbiology Results (last 7 days)     Procedure Component Value Units Date/Time    Blood culture [[id number]] Collected:  02/10/17 0826    Order Status:  Completed Specimen:  Blood Updated:  02/11/17 1412     Blood Culture, Routine No Growth to date     Blood Culture, Routine No Growth to date    Blood culture [[id number]] Collected:  02/10/17 0219    Order Status:  Completed Specimen:  Blood Updated:  02/11/17 1212     Blood Culture, Routine No Growth to date     Blood Culture, Routine No Growth to date    Blood culture [[id number]]     Order Status:  Canceled Specimen:  Blood     Blood culture [[id number]]     Order Status:  Canceled Specimen:  Blood     Blood culture #1 [[id number]] Collected:  02/04/17 0804    Order Status:  Completed Specimen:  Blood from Peripheral, Wrist, Left Updated:  02/09/17 1012     Blood Culture, Routine No growth after 5 days.    Narrative:       Blood Culture #1    Blood culture #2 [[id number]] Collected:  02/04/17 0805    Order  Status:  Completed Specimen:  Blood from Peripheral, Hand, Left Updated:  02/09/17 1012     Blood Culture, Routine No growth after 5 days.    Narrative:       Blood Culture #2        Imaging Results         X-Ray Abdomen AP 1 View (Final result) Result time:  02/09/17 07:39:56    Final result by Sherwin Maher III, MD (02/09/17 07:39:56)    Narrative:    Findings: Central line in SVC.  There is borderline cardiomegaly.  There is a pacer.  No obstruction, ileus, or perforation seen.      Electronically signed by: SHERWIN MAHER  Date:     02/09/17  Time:    07:39             US Upper Extremity Arteries Left (Final result) Result time:  02/05/17 16:32:09    Procedure changed from US Extremity Non Vascular Limited Right        Final result by Rodriguez Delatorre MD (02/05/17 16:32:09)    Impression:      Largely patent dialysis graft with an area of no flow at the superficial aspect, possibly related to the graft itself or a small amount of nonocclusive thrombus.  ______________________________________     Electronically signed by resident: RODRIGUEZ DELATORRE MD  Date:     02/05/17  Time:    16:28            As the supervising and teaching physician, I personally reviewed the images and resident's interpretation and I agree with the findings.          Electronically signed by: GERARDO BROWER MD  Date:     02/05/17  Time:    16:32     Narrative:    Comparison: None    Findings: Sonographic and color Doppler images were obtained of the left upper extremity dialysis graft.    There is excellent flow through the dialysis graft without occlusion or significant stenosis.  There is however an area of no flow involving the superficial aspect of the graft is 1 mm in thickness, which may related to the graft device itself or a small amount of nonocclusive thrombus.            X-Ray Chest 1 View (Final result) Result time:  02/04/17 14:16:33    Final result by Gerardo Brower MD (02/04/17 14:16:33)    Impression:       Interval  placement of right-sided central venous catheter with its tip in the mid SVC.    Mildly prominent interstitial lung markings similar to prior exam which may be seen with mild vascular congestion.          Electronically signed by: GERARDO BROWER MD  Date:     02/04/17  Time:    14:16     Narrative:    [id number]  Accession # [id number]      Study:  XR CHEST 1 VIEW    Indication: Kidney failure.    Comparison: Chest radiograph from 02/04/2017.    Findings:     XR CHEST 1 VIEW.    Cardiac silhouette is moderately enlarged similar to prior exam.  Interval placement of right-sided central venous catheter with its tip in the mid SVC.  Stable cardiac pacer.  Prominence of pulmonary vasculature.  Mildly prominent interstitial lung markings similar to prior exam which may be seen with mild vascular congestion.  No pleural effusion.            X-Ray Chest 1 View (Final result) Result time:  02/04/17 10:11:27    Final result by Gerardo Brower MD (02/04/17 10:11:27)    Impression:       No acute intrathoracic process.  Stable mild cardiomegaly.          Electronically signed by: GERARDO BROWER MD  Date:     02/04/17  Time:    10:11     Narrative:    [id number]  Accession # [id number]      Study:  XR CHEST 1 VIEW    Indication: Chest pain.    Comparison: Chest radiograph from 01/21/2017.    Findings:     XR CHEST 1 VIEW.    Mild cardiomegaly, unchanged.  Stable cardiac pacer.  Mild prominence of pulmonary vasculature.  Lungs are clear.  No pleural effusion.            Pending Diagnostic Studies:     None        Final Active Diagnoses:    Diagnosis Date Noted POA    Vitamin D insufficiency [E55.9] 02/06/2017 Yes    Impaired glucose tolerance [R73.02] 02/06/2017 Yes    Hypotension [I95.9] 01/21/2017 Yes    Current chronic use of systemic steroids [Z79.52] 11/18/2016 Not Applicable     Chronic    Anemia in ESRD (end-stage renal disease) [N18.6, D63.1] 03/07/2016 Yes     Chronic    ESRD on hemodialysis [N18.6, Z99.2] 02/23/2016  Not Applicable     Chronic    Secondary pulmonary hypertension [I27.2] 04/07/2015 Yes     Chronic    Paroxysmal atrial fibrillation [I48.0] 04/06/2015 Yes     Chronic    Intermittent generalized abdominal pain [R10.84] 10/17/2013 Yes    Morbid obesity with BMI of 40.0-44.9, adult [E66.01, Z68.41] 08/15/2013 Not Applicable     Chronic    Chronic combined systolic and diastolic heart failure [I50.42] 06/14/2013 Yes     Chronic    Pulmonary sarcoidosis [D86.0] 03/19/2013 Yes     Chronic    Seizure disorder [G40.909] 03/19/2013 Yes     Chronic      Problems Resolved During this Admission:    Diagnosis Date Noted Date Resolved POA    PRINCIPAL PROBLEM:  Dialysis AV fistula malfunction [T82.590A]  02/11/2017 Yes      Discharged Condition: stable    Disposition: Home or Self Care    Follow Up:  Follow-up Information     Follow up with Torrey Villafana MD In 1 month.    Specialty:  Vascular Surgery    Why:  with [facility]    Contact information:    [address]  [facility] [id number] [phone]          Follow up with TYRESE Jon On 2/16/2017.    Specialty:  Internal Medicine    Why:  @ 3:00    Contact information:    [address]  [facility] [id number] [phone]          Schedule an appointment as soon as possible for a visit with Bladimir Adorno MD.    Specialties:  Cardiology, Electrophysiology    Why:  For discharge from hospital follow up    Contact information:    [address]  [facility] [id number] [phone]          Schedule an appointment as soon as possible for a visit with VINAY Harvey MD.    Specialty:  Pulmonary Disease    Why:  For discharge from hospital follow up    Contact information:    [address] [facility] [id number] [phone]          Schedule an appointment as soon as possible for a visit with [facility] ENDOCRINOLOGY.    Specialty:  Endocrinology    Why:  To establish care    Contact information:    [facility]  [place]  [place] [address]  [phone]        Follow up with Torrey Villafana MD. Schedule an appointment as soon as possible for a visit in 1 week.    Specialty:  Vascular Surgery    Why:  For discharge from hospital follow up    Contact information:    [address]  [facility] [address]  [phone]          Follow up with [facility] - Cou[facility]in. Schedule an appointment as soon as possible for a visit in 3 days.    Specialty:  Cardiology    Why:  As previously planned    Contact information:    [address]  [facility] [id number] [phone]    Additional information:    3rd floor        Patient Instructions:     VAS US Hemodialysis Access   Standing Status: Future  Standing Exp. Date: 02/08/18   Order Comments: JAKE ANDRADE     Ambulatory Referral to Endocrinology   Referral Priority: Routine Referral Type: Consultation   Requested Specialty: Endocrinology    Number of Visits Requested: 1      Diet renal     Diet Cardiac     Activity as tolerated     Call MD for:  temperature >100.4     Call MD for:  persistent nausea and vomiting or diarrhea     Call MD for:  difficulty breathing or increased cough     Call MD for:  persistent dizziness, light-headedness, or visual disturbances     Call MD for:  increased confusion or weakness       Medications:  Reconciled Home Medications:   Current Discharge Medication List      START taking these medications    Details   clopidogrel (PLAVIX) 75 mg tablet Take 1 tablet (75 mg total) by mouth once daily.  Qty: 30 tablet, Refills: 11      inulin-sorbitol 2 gram Chew Take 2 tablets by mouth 2 (two) times daily.  Refills: 0      polyethylene glycol (GLYCOLAX) 17 gram/dose powder Take 17 g by mouth once daily. Dissolve in juice.  Qty: 510 g, Refills: 0         CONTINUE these medications which have CHANGED    Details   atorvastatin (LIPITOR) 80 MG tablet Take 1 tablet (80 mg total) by mouth nightly.  Qty: 90 tablet, Refills: 1    Associated Diagnoses: Chronic combined  systolic and diastolic heart failure      metoprolol tartrate (LOPRESSOR) 25 MG tablet Take 0.5 tablets (12.5 mg total) by mouth 2 (two) times daily.  Qty: 60 tablet, Refills: 11      midodrine (PROAMATINE) 5 MG Tab Take 3 tablets (15 mg total) by mouth every Tues, Thurs, Sat.         CONTINUE these medications which have NOT CHANGED    Details   ACZONE 5 % topical gel Apply to face every morning  Refills: 2      ammonium lactate (LAC-HYDRIN) 12 % lotion Apply topically as needed (to legs).   Refills: 2      gabapentin (NEURONTIN) 300 MG capsule Take 1 capsule (300 mg total) by mouth 3 (three) times daily.  Qty: 270 capsule, Refills: 0    Comments: **Patient requests 90 days supply**      lactulose (CHRONULAC) 20 gram/30 mL Soln Take 15 mLs (10 g total) by mouth 3 (three) times daily as needed (for constipation).  Qty: 450 mL, Refills: 1    Associated Diagnoses: Constipation, unspecified constipation type      levetiracetam (KEPPRA) 500 MG Tab Take 1 tablet (500 mg total) by mouth 2 (two) times daily.  Qty: 60 tablet, Refills: 5    Associated Diagnoses: Seizure disorder      omeprazole (PRILOSEC) 20 MG capsule Take 1 capsule (20 mg total) by mouth once daily.  Qty: 30 capsule, Refills: 5    Associated Diagnoses: Gastroesophageal reflux disease with esophagitis      prednisoLONE acetate (PRED FORTE) 1 % DrpS INSTILL ONE DROP INTO  LEFT EYE  TWICE A DAY  Refills: 3      predniSONE (DELTASONE) 10 MG tablet Takes one 10 mg tablet every morning  Qty: 60 tablet, Refills: 3    Associated Diagnoses: Sarcoidosis of lung      RENVELA 800 mg Tab TAKE 1 TABLET BY MOUTH THREE TIMES DAILY WITH MEALS  Qty: 90 tablet, Refills: 0      tizanidine 4 mg Cap Take 1 capsule by mouth 2 (two) times daily as needed.  Qty: 60 capsule, Refills: 0    Associated Diagnoses: Muscle spasm      warfarin (COUMADIN) 5 MG tablet TAKE 1 TABLET BY MOUTH EVERYDAY EXCEPT 1 AND 1/2 TABLETS ON MONDAY AND FRIDAY OR AS DIRECTED BY CLINIC  Qty: 189 tablet,  "Refills: 0    Comments: **Patient requests 90 days supply**  Associated Diagnoses: Long-term (current) use of anticoagulants; Paroxysmal atrial fibrillation      econazole nitrate 1 % cream       ketorolac 0.5% (ACULAR) 0.5 % Drop instill one drop into both eyes every morning  Refills: 3         STOP taking these medications       aspirin 81 MG Chew Comments:   Reason for Stopping:         fludrocortisone (FLORINEF) 0.1 mg Tab Comments:   Reason for Stopping:         amoxicillin-clavulanate 875-125mg (AUGMENTIN) 875-125 mg per tablet Comments:   Reason for Stopping:             Time spent on the discharge of patient: 50 minutes  Time Spent:  Included reviewing hospital course with patient/family, removing central line, reviewing discharge medications, and arranging follow-up care.  Face to face services were provided on 2/11/2017   Physical Exam on 2/11/2017:  height is 5' 8" (1.727 m) and weight is 118.4 kg (261 lb 0.4 oz). Her oral temperature is 98.6 °F (37 °C). Her blood pressure is 93/50 (abnormal) and her pulse is 104. Her respiration is 18 and oxygen saturation is 94% (abnormal).    Cardiovascular: Normal rate, regular rhythm, S1 normal and S2 normal.   Pulmonary/Chest: Effort normal and breath sounds normal.  Abdominal: Soft. Bowel sounds are normal. There is no tenderness.        Litzy Whitaker MD  Department of Hospital Medicine  [facility]    "

## 2017-02-11 NOTE — ASSESSMENT & PLAN NOTE
Hx of pulmonary sarcoidosis, on home oxygen (4-5 L ), follows with .   Taking prednisone 10 mg daily at home. Now on prednisone 20 mg, plan to wean steroids to home dose 10 mg daily before discharge and follow up with Dr. Harvey.  Resumed prednisone 10 mg 2/9/2017.  BP has been low at times.

## 2017-02-11 NOTE — PROGRESS NOTES
[facility] HEMODIALYSIS NOTE     Patient currently on hemodialysis for removal of uremic toxins and volume.    Patient seen and evaluated on hemodialysis, tolerating treatment, see HD flowsheet for vitals and assessments.      Ultrafiltration goal is 2 L as tolerated     Labs have been reviewed and the dialysate bath has been adjusted.     Assessment/Plan:     HD today for removal of uremic toxins and volume.

## 2017-02-11 NOTE — PROGRESS NOTES
3 HR maintenance HD treatment initiated via Lt  Upper AVF without difficulty, good a/v flows obtained.

## 2017-02-11 NOTE — ASSESSMENT & PLAN NOTE
Endocrine consulted  Continue prednisone 10 mg daily and discharge on this dose  No further need for Florinef

## 2017-02-11 NOTE — PROGRESS NOTES
[facility] Hospital Medicine  Progress Note    Patient Name: Sisi Medel  MRN: [id number]  Patient Class: IP- Inpatient   Admission Date: 2/4/2017  Length of Stay: 6 days  Attending Physician: Litzy Whitaker MD  Primary Care Provider: Carlos A Caputo MD    [facility] Medicine Team: [facility] MED G Litzy Whitaker MD    Subjective:     Principal Problem:Dialysis AV fistula malfunction    HPI:  Ms. Ssii Medel is a 56 y.o. 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  states that she went for dialysis today for HD but her AV fistula clotted and unable to perform HD and went home. She is brought by her mother because she is feeling fatigue and generally weak. She states that no attempt to de-clot AV fistula at dialysis center. Patient reports that her BP always low but not sure why, she takes midodrine 15 mg before dialysis that she took this am. She you cough, SOB, fever, chills or diarrhea. On initial evaluation patient resting comfortably on 2 L NC, BP 91/56 , HR 78 in A-fib and not in distress.     Hospital Course:  2/4/17 Patient admitted to MICU for CRRT due to hypotension.   2/5/17 started on CRRT , tolerated well.  BP improved       Interval History: Patient having constipation, less abdominal discomfort however. WBC trending down slightly.    Review of Systems   Constitutional: Negative for fever.   Respiratory: Negative for cough.    Cardiovascular: Negative for chest pain.   Gastrointestinal: Positive for constipation.     Objective:     Vital Signs (Most Recent):  Temp: 98.2 °F (36.8 °C) (02/10/17 1542)  Pulse: 91 (02/10/17  1657)  Resp: 18 (02/10/17 1542)  BP: (!) 82/52 (02/10/17 1542)  SpO2: 98 % (02/10/17 1542) Vital Signs (24h Range):  Temp:  [97.8 °F (36.6 °C)-98.8 °F (37.1 °C)] 98.2 °F (36.8 °C)  Pulse:  [] 91  Resp:  [18] 18  SpO2:  [97 %-100 %] 98 %  BP: (75-98)/(47-60) 82/52     Weight: 118.4 kg (261 lb 0.4 oz)  Body mass index is 39.69 kg/(m^2).    Intake/Output Summary (Last 24 hours) at 02/10/17 1743  Last data filed at 02/10/17 0500   Gross per 24 hour   Intake              100 ml   Output                0 ml   Net              100 ml      Physical Exam   Constitutional: She appears well-developed.   HENT:   Right Ear: External ear normal.   Left Ear: External ear normal.   Mouth/Throat: Mucous membranes are not cyanotic.   Eyes: Conjunctivae and lids are normal. Pupils are equal.   Cardiovascular: Normal rate, regular rhythm, S1 normal and S2 normal.    Pulmonary/Chest: Effort normal and breath sounds normal. No stridor.   Abdominal: Soft. Bowel sounds are normal. There is generalized tenderness (mild). There is no guarding.   Musculoskeletal: She exhibits no edema.   Neurological: She is alert. She is not disoriented.   Skin: She is not diaphoretic. No cyanosis. Nails show no clubbing.   Psychiatric: She has a normal mood and affect.       Significant Labs:     CBC:     Recent Labs  Lab 02/09/17  0300 02/10/17  0315   WBC 19.66* 15.57*   HGB 10.3* 9.3*   HCT 32.6* 29.6*    128*     CMP:     Recent Labs  Lab 02/09/17  0859   *   K 4.7   CL 98   CO2 20*   *   BUN 62*   CREATININE 7.8*   CALCIUM 9.4   ALBUMIN 3.2*   ANIONGAP 14   EGFRNONAA 5.3*     Assessment/Plan:      * Dialysis AV fistula malfunction  Patient underwent Declot 2/7/2017; successful HD afterwards.  HD planned for 02/11/17    Pulmonary sarcoidosis  Hx of pulmonary sarcoidosis, on home oxygen (4-5 L ), follows with .   Taking prednisone 10 mg daily at home. Now on prednisone 20 mg, plan to wean steroids to home dose 10 mg  daily before discharge and follow up with Dr. Harvey.  Resumed prednisone 10 mg 2/9/2017.  BP has been low at times.    Seizure disorder  Keppra BID    Intermittent generalized abdominal pain  Possible constipation; KUB without pathology.  Trial of Miralax and bisacodyl for constipation but no BM. Bentyl started for cramping.  Continues to have constipation; increasing bowel regimen.    Paroxysmal atrial fibrillation  Held anticoag for De-clot 2/7/2017  Restarted Lopressor for rate control now that blood pressures are better  Hx of tachy /mauro syndrome s/p pacemaker on Coumadin at home, restarted Coumadin 2/8/17.    ESRD on hemodialysis  Clotted AV Fistula, De-clotting 2/7/2017  Nephrology following  Temporary line in place for HD to be removed now that AVG is functioning.    Chronic combined systolic and diastolic heart failure  Appears compensated.    Current chronic use of systemic steroids  Endocrine consulted  Continue prednisone 10 mg daily and discharge on this dose  No further need for Florinef    Hypotension  BP remains intermittently low.  Prednisone started over hydrocortisone which was discontinued; WBC elevated afterwards. Patient afebrile.    VTE Risk Mitigation     None          Litzy Whitaker MD  Department of Hospital Medicine   [facility]

## 2017-02-11 NOTE — ASSESSMENT & PLAN NOTE
Possible constipation; KUB without pathology.  Trial of Miralax and bisacodyl for constipation but no BM. Bentyl started for cramping.  Continues to have constipation; increasing bowel regimen.

## 2017-02-12 NOTE — PLAN OF CARE
Problem: Patient Care Overview  Goal: Plan of Care Review  Outcome: Ongoing (interventions implemented as appropriate)  POC reviewed with pt and family at 1400. Dialysis today.  Pt verbalized understanding. Questions and concerns addressed. No acute events today. Pt progressing toward goals. Will continue to monitor. See flowsheets for full assessment and VS info.

## 2017-02-13 NOTE — PROGRESS NOTES
Pt called 2/13/17 to inform us that she was dc'd on 2/11/17 from ([facility] [place] Pt said she was sent home on warfarin ( 5mg qd, except 2.5mg Sun/tues). ([phone]) Please advise.

## 2017-02-13 NOTE — PROGRESS NOTES
[place] [facility] [place], fatigue, clotted AV fistula. Calendar updated with doses administered during her hospital stay. She says she was discharged on

## 2017-02-14 ENCOUNTER — ANTI-COAG VISIT (OUTPATIENT)
Dept: CARDIOLOGY | Facility: CLINIC | Age: 57
DRG: 312 | End: 2017-02-14
Payer: MEDICARE

## 2017-02-14 ENCOUNTER — TELEPHONE (OUTPATIENT)
Dept: ADMINISTRATIVE | Facility: HOSPITAL | Age: 57
End: 2017-02-14

## 2017-02-14 DIAGNOSIS — I48.0 PAROXYSMAL ATRIAL FIBRILLATION: ICD-10-CM

## 2017-02-14 DIAGNOSIS — Z79.01 LONG-TERM (CURRENT) USE OF ANTICOAGULANTS: Primary | ICD-10-CM

## 2017-02-14 LAB — INR PPP: 1.5 (ref 2–3)

## 2017-02-14 PROCEDURE — 85610 PROTHROMBIN TIME: CPT | Mod: QW,S$GLB,, | Performed by: PHARMACIST

## 2017-02-14 PROCEDURE — 99211 OFF/OP EST MAY X REQ PHY/QHP: CPT | Mod: 25,S$GLB,, | Performed by: PHARMACIST

## 2017-02-14 NOTE — PROGRESS NOTES
Patient recently dc from [facility] but no missed doses of coumadin reported. INR continues to hover low. I will increase dose and ask for close follow up

## 2017-02-14 NOTE — TELEPHONE ENCOUNTER
"Please see message below from [facility] nurse-     Completed a hospital follow up call w/ the patient today . She was sent home w/o a script for Metoprolol . Listed on her discharge instructions as a "changed " medication    the patient reported the following BP concerns - in HD today . I s/w the Registered nurse at the HD center Carli Doyle RN    BP upon arrival 115/68 subsequent BP's at highest 103/76, and 77/44 at the lowest . HR at the HD clinic the nurse reports has maintained 70-90's . Reports the patient is asymptomatic w/ the BP readings and "this always happens "    the patient reports her BP has been low at home as well . This message is document that the patient has NOT started the metoprolol .    I have discussed this w/ the [facility] as well and confirmed that the patient has a follow up on 2/16/17. Patient will bring all her meds to this appt for further review and reconciliation.     Thanks ,  Jani Howell RN  [facility] RN Navigator  "

## 2017-02-15 LAB
BACTERIA BLD CULT: NORMAL
BACTERIA BLD CULT: NORMAL

## 2017-02-16 ENCOUNTER — OFFICE VISIT (OUTPATIENT)
Dept: PRIMARY CARE CLINIC | Facility: CLINIC | Age: 57
DRG: 312 | End: 2017-02-16
Payer: MEDICARE

## 2017-02-16 VITALS
OXYGEN SATURATION: 96 % | HEIGHT: 68 IN | HEART RATE: 67 BPM | DIASTOLIC BLOOD PRESSURE: 43 MMHG | SYSTOLIC BLOOD PRESSURE: 91 MMHG | WEIGHT: 256.19 LBS | BODY MASS INDEX: 38.83 KG/M2

## 2017-02-16 DIAGNOSIS — I48.19 ATRIAL FIBRILLATION, PERSISTENT: ICD-10-CM

## 2017-02-16 DIAGNOSIS — I95.89 OTHER SPECIFIED HYPOTENSION: ICD-10-CM

## 2017-02-16 DIAGNOSIS — G40.909 SEIZURE DISORDER: Chronic | ICD-10-CM

## 2017-02-16 DIAGNOSIS — I50.42 CHRONIC COMBINED SYSTOLIC AND DIASTOLIC HEART FAILURE: Chronic | ICD-10-CM

## 2017-02-16 DIAGNOSIS — D86.0 PULMONARY SARCOIDOSIS: Chronic | ICD-10-CM

## 2017-02-16 DIAGNOSIS — I48.0 PAROXYSMAL ATRIAL FIBRILLATION: Chronic | ICD-10-CM

## 2017-02-16 DIAGNOSIS — D84.9 IMMUNOSUPPRESSED STATUS: ICD-10-CM

## 2017-02-16 DIAGNOSIS — Z79.52 CURRENT CHRONIC USE OF SYSTEMIC STEROIDS: Chronic | ICD-10-CM

## 2017-02-16 DIAGNOSIS — Z79.01 LONG-TERM (CURRENT) USE OF ANTICOAGULANTS: ICD-10-CM

## 2017-02-16 DIAGNOSIS — J96.11 CHRONIC RESPIRATORY FAILURE WITH HYPOXIA: Primary | Chronic | ICD-10-CM

## 2017-02-16 DIAGNOSIS — I10 ESSENTIAL HYPERTENSION: Chronic | ICD-10-CM

## 2017-02-16 PROBLEM — R73.02 IMPAIRED GLUCOSE TOLERANCE: Status: RESOLVED | Noted: 2017-02-06 | Resolved: 2017-02-16

## 2017-02-16 PROCEDURE — 99214 OFFICE O/P EST MOD 30 MIN: CPT | Mod: S$GLB,,, | Performed by: NURSE PRACTITIONER

## 2017-02-16 PROCEDURE — 99999 PR PBB SHADOW E&M-EST. PATIENT-LVL IV: CPT | Mod: PBBFAC,,, | Performed by: NURSE PRACTITIONER

## 2017-02-16 PROCEDURE — 99499 UNLISTED E&M SERVICE: CPT | Mod: S$GLB,,, | Performed by: NURSE PRACTITIONER

## 2017-02-16 RX ORDER — DICLOFENAC SODIUM 10 MG/G
2 GEL TOPICAL EVERY 8 HOURS PRN
Qty: 100 G | Refills: 0 | Status: SHIPPED | OUTPATIENT
Start: 2017-02-16 | End: 2017-10-30 | Stop reason: SDUPTHER

## 2017-02-16 NOTE — PATIENT INSTRUCTIONS
1) Purchase a blood pressure cuff and start monitoring blood pressure, particularly on dialysis days.     2) Hold the Metoprolol if blood pressure < 90/50 on dialysis days.   *Note that this medication is for your A -Fib and although your blood pressures may be low, your HR needs to range between .  Today in clinic: 67 (Heartrate)       [facility] Visit (Post Discharge Follow-up) Today:   - Our clinic physicians and nurses plan to follow the patient up for any medical issues in the [facility] for 30 days post discharge.    Future Appointments  Date Time Provider Department Center   2/17/2017 2:40 PM Silas Harrell III, MD [facility] NEURO [facility]   2/20/2017 1:00 PM VASCULAR, LAB [facility] VASCLAB [facility]   2/20/2017 1:45 PM Torrey Villafana MD [facility] VASCSUR [facility]   2/20/2017 2:00 PM LAB, APPOINTMENT [facility] LAB VNP [facility]   3/6/2017 2:30 PM VINAY Harvey MD [facility] PULMSVC [facility]   3/8/2017 9:00 AM Cirilo Broussard MD [facility] ENDOCRN [facility]   4/28/2017 8:00 AM Carlos A Caputo MD [facility] IM [facility] PCW   4/28/2017 12:30 PM EKG, APPT [facility] EKG [facility]   4/28/2017 1:00 PM PACEMAKER, ICD [facility] ARRHYTH [facility]   4/28/2017 1:40 PM Bladimir Adorno MD [facility] ARRHYTH [facility]

## 2017-02-16 NOTE — Clinical Note
[facility] Visit (Post Discharge Follow-up) Today:  - Our clinic physicians and nurses plan to follow the patient up for any medical issues in the [facility] for 30 days post discharge.  Future Appointments: 2/17/2017  2:40 PM    Silas Harrell III, MD      [facility] NEURO     [facility] 2/20/2017  1:00 PM    VASCULAR, LAB              [facility] VASCLAB   [facility] 2/20/2017  1:45 PM    Torrey Villafana MD    [facility] VASCSUR   [facility] 2/20/2017  2:00 PM    LAB, APPOINTMENT [place] LAB VNP   [facility]  3/6/2017   2:30 PM    VINAY Harvey MD      [facility] PULMSVC   [facility] 3/8/2017   9:00 AM    Cirilo Broussard MD          [facility] ENDOCRN   [facility] 4/28/2017  8:00 AM    Carlos A Caputo MD     [facility] IM        [facility] PCW  4/28/2017  12:30 PM   EKG, APPT                  [facility] EKG       [facility] 4/28/2017  1:00 PM    PACEMAKER, ICD             [facility] ARRHYTH   [facility] 4/28/2017  1:40 PM    Bladimir Adorno MD        [facility] ARRHYTH   [facility]

## 2017-02-16 NOTE — PROGRESS NOTES
"[facility]  Follow-up Visit Progress Note     PRESENTING HISTORY     PCP: Carlos A Caputo MD  Chief Complaint/Reason for Visit:  Follow up visit from recent visit.      No chief complaint on file.      History of Present Illness: Ms. Sisi Medel is a 56 y.o. female.     [facility] Medicine  Discharge Summary        Patient Name: Sisi Medel  MRN: [id number]  Admission Date: 2/4/2017  Hospital Length of Stay: 7 days  Discharge Date and Time: 2/11/2017 6:35 PM  Attending Physician: Litzy Whitaker MD   Discharging Provider: Litzy Whitaker MD  Primary Care Provider: Carlos A Caputo MD     [facility] Medicine Team: [facility] MED  Litzy Whitaker MD     HPI: "56 y.o. female with history of Pulmonary sarcoidosis on home oxygen and prednisone, CHF ,paroxysmal AFIB, s/p recent (1/11/17) BiV pacemaker implanted in anticipation of AVN RFA, seizure disorder on Keppra, ESRD on HD TTS ( for the past year, anuric), presumptive adrenal insufficiency (on midodrine, Fludrocortisone) presented due to weakness and clotted AV fistula. She went for dialysis center today for HD but her AV fistula was clotted and they unable to perform HD so she went home. She was brought to ED by her mother because she is feeling fatigue and generally weak. She states that no attempt to de-clot AV fistula was made at dialysis center. Patient reports that her BP is always low but not sure why, she takes midodrine 15 mg before dialysis; she took it this am. She denies cough, SOB, fever, chills or diarrhea."         Procedure(s) (LRB):  DECLOT GRAFT PERCUTANEOUS (Left)  ANGIOPLASTY-PERCUTANEOUS TRANSLUMINAL (PTA) (Left)  PLACEMENT-STENT (Left)       Indwelling Lines/Drains at time of discharge:       Lines/Drains/Airways            Central Venous Catheter Line                             Hemodialysis Catheter 02/04/17 1315 right internal jugular -- REMOVED 7 days                    Drain         "        Hemodialysis AV Graft Left upper arm -- days                  Hospital Course:      Dialysis AV fistula malfunction  Patient was initially admitted to MICU for CRRT due to hypotension. Tolerated CRRT well. BP improved. Patient underwent Declot 2/7/2017; successful HD afterwards. Plavix started.  HD planned for 02/11/17      Pulmonary sarcoidosis  Hx of pulmonary sarcoidosis, on home oxygen (4-5 L ), follows with .   Taking prednisone 10 mg daily at home. In hospital on prednisone 20 mg, weaned to home dose 10 mg daily before discharge and plan to follow up with Dr. Harvey.  Resumed prednisone 10 mg 2/9/2017.  BP has been low at times.      Seizure disorder  Continued Keppra BID      Intermittent generalized abdominal pain  Possible constipation; KUB without pathology.  Trial of Miralax and bisacodyl for constipation but no BM. Bentyl started for cramping.  Continues to have constipation; increasing bowel regimen. Recommend FiberChoice and Miralax daily.      Paroxysmal atrial fibrillation  Held anticoag for De-clot 2/7/2017  Restarted Lopressor for rate control now that blood pressures are better.  Hx of tachy/mauro syndrome s/p pacemaker, on Coumadin at home, restarted Coumadin 2/8/17.      ESRD on hemodialysis  Clotted AV Fistula, De-clotting 2/7/2017  Nephrology followed  Temporary line in place for HD removed now that AVG is functioning.      Chronic combined systolic and diastolic heart failure  Appears compensated.      Current chronic use of systemic steroids  Endocrine was consulted: Continue prednisone 10 mg daily and discharge on this dose  No further need for Florinef      Hypotension  She was treated with stress dose hydrocortisone in ICU.  BP remains intermittently low.  Prednisone started over hydrocortisone which was discontinued.   WBC elevated after Prednisone increased to 20 mg daily. Patient afebrile.        Today:  Sisi  presents to  today, accompanied by her mother and her  "significant other. During this visit, the patient was rocking back and forth at times. Stating, "I'm tired". Most of the questions were being answered by her mother, not the patient or her reported "". Both seemed to have deferred to her.   It is unclear "what" specifically the mother's concerns are.  The patient was forthcoming about her chronic shoulder pain and inability to take Zanaflex and being "just so tired after dialysis". Her mother interjects, with complaints of:  ` "every time she comes back from dialysis she's like this"  ` they're giving her seizure medicine, but she not having any seizures'  ` 'her blood pressure is low, so why she's taking this medicine'  ` 'tyler't see more than one dialysis doctor'  ` 'no one is doing anything for her; put her on medicine, but they never do anything for her'    During this time, the patient did not say but 2 words at most. Offered to re admit, if she was "feeling as bad" as her mother was reporting, but she declined. Mother again interjects saying, "for what; why readmit; they don't do anything for her; she goes home just like she went in".     Patient did answer to the following:  Not sob, no chest pain, no recent falls, no dizziness, no headaches, no abd pain.     Review of Systems:  Eyes: denies visual changes at this time denies floaters   ENT: no nasal congestion or sore throat  Respiratory: no cough or shorness of breath  Cardiovascular: no chest pain or palpitations  Gastrointestinal: no nausea or vomiting, no abdominal pain or change in bowel habits  Genitourinary: no hematuria or dysuria; denies frequency  Hematologic/Lymphatic: no easy bruising or lymphadenopathy  Musculoskeletal: no arthralgias or myalgias  Neurological: no seizures or tremors  Endocrine: no heat or cold intolerance      PAST HISTORY:     Past Medical History   Diagnosis Date    Anemia in ESRD (end-stage renal disease) 3/7/2016    Anticoagulant long-term use     Cervical " radiculopathy 2015    Cervical radiculopathy 2015    CHF (congestive heart failure)     Chronic combined systolic and diastolic heart failure 2013     EF 20% , improved with Medical therapy, negative PET     Chronic respiratory failure with hypoxia 2013     On home oxygen at 2-5 liters    Chronic rhinitis 10/2/2013    Closed fracture of proximal end of right fibula 2016    Complications due to renal dialysis device, implant, and graft     DDD (degenerative disc disease), lumbar 2015    DDD (degenerative disc disease), lumbar 2015    ESRD on hemodialysis 2016    Essential hypertension     Gout     Hyperlipidemia 3/7/2016    Lateral meniscal tear 2016    Lateral meniscal tear 2016    Lumbar stenosis 2015    Lumbar stenosis 2015    Morbid obesity 8/15/2013    Muscle spasm 2016     muscle aches and spasms since starting on hemodialysis    Other chronic pulmonary heart diseases 2013    Paroxysmal atrial fibrillation 2014     Not on anticoagulation    Peripheral neuropathy 2013    Peripheral neuropathy 2013    Personal history of fall 2014    Personal history of gastric ulcer 3/19/2013    Sarcoidosis, lung 2009     Diagnosed in  with ocular manifestation, on 4L home O2 and PO steroids    Secondary pulmonary hypertension 2015    Seizure disorder 3/19/2013    Seizures     Shingles 2013    Spondylarthrosis 2015    Spondylarthrosis 2015    Thyroid disease        Past Surgical History   Procedure Laterality Date    Abdominal surgery      Vascular surgery       section       x2    Other surgical history       loop recorder implant    Loop recorder         Family History   Problem Relation Age of Onset    Kidney failure Mother     Hypertension Mother     Diabetes Mother     Coronary artery disease Father     Hypertension Father     Diabetes Father      Lupus Sister     Diabetes Maternal Grandmother     Colon cancer Neg Hx     Ovarian cancer Neg Hx     Breast cancer Neg Hx     Melanoma Neg Hx        Social History     Social History    Marital status: Single     Spouse name: N/A    Number of children: N/A    Years of education: N/A     Social History Main Topics    Smoking status: Former Smoker     Packs/day: 0.50     Years: 10.00     Types: Cigarettes     Quit date: 4/22/1994    Smokeless tobacco: Never Used    Alcohol use No    Drug use: No    Sexual activity: Not Asked     Other Topics Concern    None     Social History Narrative    Lives with boyfriend/partner who helps with her care.     Plans to travel to [place] for 2 weeks in 9/2016       MEDICATIONS & ALLERGIES:     Current Outpatient Prescriptions on File Prior to Visit   Medication Sig Dispense Refill    ACZONE 5 % topical gel Apply to face every morning  2    ammonium lactate (LAC-HYDRIN) 12 % lotion Apply topically as needed (to legs).   2    atorvastatin (LIPITOR) 80 MG tablet Take 1 tablet (80 mg total) by mouth nightly. 90 tablet 1    clopidogrel (PLAVIX) 75 mg tablet Take 1 tablet (75 mg total) by mouth once daily. 30 tablet 11    econazole nitrate 1 % cream       gabapentin (NEURONTIN) 300 MG capsule Take 1 capsule (300 mg total) by mouth 3 (three) times daily. 270 capsule 0    inulin-sorbitol 2 gram Chew Take 2 tablets by mouth 2 (two) times daily.  0    ketorolac 0.5% (ACULAR) 0.5 % Drop instill one drop into both eyes every morning  3    lactulose (CHRONULAC) 20 gram/30 mL Soln Take 15 mLs (10 g total) by mouth 3 (three) times daily as needed (for constipation). 450 mL 1    levetiracetam (KEPPRA) 500 MG Tab Take 1 tablet (500 mg total) by mouth 2 (two) times daily. 60 tablet 5    metoprolol tartrate (LOPRESSOR) 25 MG tablet Take 0.5 tablets (12.5 mg total) by mouth 2 (two) times daily. 60 tablet 11    midodrine (PROAMATINE) 5 MG Tab Take 3 tablets (15 mg total) by mouth  every Tues, Thurs, Sat.      omeprazole (PRILOSEC) 20 MG capsule Take 1 capsule (20 mg total) by mouth once daily. 30 capsule 5    polyethylene glycol (GLYCOLAX) 17 gram/dose powder Take 17 g by mouth once daily. Dissolve in juice. 510 g 0    prednisoLONE acetate (PRED FORTE) 1 % DrpS INSTILL ONE DROP INTO  LEFT EYE  TWICE A DAY  3    predniSONE (DELTASONE) 10 MG tablet Takes one 10 mg tablet every morning 60 tablet 3    RENVELA 800 mg Tab TAKE 1 TABLET BY MOUTH THREE TIMES DAILY WITH MEALS 90 tablet 0    tizanidine 4 mg Cap Take 1 capsule by mouth 2 (two) times daily as needed. 60 capsule 0    warfarin (COUMADIN) 5 MG tablet TAKE 1 TABLET BY MOUTH EVERYDAY EXCEPT 1 AND 1/2 TABLETS ON MONDAY AND FRIDAY OR AS DIRECTED BY CLINIC 189 tablet 0     No current facility-administered medications on file prior to visit.         Review of patient's allergies indicates:   Allergen Reactions    Bactrim [sulfamethoxazole-trimethoprim] Other (See Comments)     Causes renal failure    Nsaids (non-steroidal anti-inflammatory drug) Other (See Comments)     Renal failure       Medications Reconciliation:   I have reconciled the patient's home medications and discharge medications with the patient/family. I have updated all changes.  Refer to After-Visit Medication List.    OBJECTIVE:     Vital Signs:  There were no vitals filed for this visit.  Wt Readings from Last 1 Encounters:   02/07/17 1043 118.4 kg (261 lb 0.4 oz)   02/04/17 0731 120 kg (264 lb 8.8 oz)     There is no height or weight on file to calculate BMI.   Wt Readings from Last 3 Encounters:   02/16/17 116.2 kg (256 lb 2.8 oz)   02/07/17 118.4 kg (261 lb 0.4 oz)   01/27/17 126.3 kg (278 lb 7.1 oz)     Temp Readings from Last 3 Encounters:   02/11/17 98.7 °F (37.1 °C) (Oral)   01/22/17 97.7 °F (36.5 °C) (Oral)   01/12/17 97.4 °F (36.3 °C) (Oral)     BP Readings from Last 3 Encounters:   02/16/17 (!) 91/43   02/11/17 (!) 84/48   01/27/17 (!) 80/53     Pulse  Readings from Last 3 Encounters:   02/16/17 67   02/11/17 100   01/27/17 75       Physical Exam:  General: Well developed, well nourished. No distress.  HEENT: Head is normocephalic, atraumatic; ears are normal.   Eyes: Clear conjunctiva.  Neck: Supple, symmetrical neck; trachea midline. No JVD  Lungs: Clear to auscultation bilaterally and normal respiratory effort.  Cardiovascular: Heart with regular rate and rhythm. No murmurs, gallops or rubs  Extremities: No LE edema. Pulses 2+ and symmetric.   Abdomen: Abdomen is soft, non-tender non-distended with normal bowel sounds.  Skin: Skin color, texture, turgor normal. No rashes.  Lymph Nodes: No cervical or supraclavicular adenopathy.  Neurologic:  No focal numbness or weakness.   Psychiatric: Not depressed.      Laboratory  Lab Results   Component Value Date    WBC 14.85 (H) 02/11/2017    HGB 8.7 (L) 02/11/2017    HCT 27.7 (L) 02/11/2017     (L) 02/11/2017    CHOL 189 05/17/2016    TRIG 69 05/17/2016    HDL 85 (H) 05/17/2016    ALT 27 02/08/2017     (H) 02/08/2017     (L) 02/11/2017    K 4.2 02/11/2017    CL 95 02/11/2017    CREATININE 7.7 (H) 02/11/2017    BUN 52 (H) 02/11/2017    CO2 25 02/11/2017    TSH 0.425 01/27/2017    INR 1.5 (A) 02/14/2017    HGBA1C 5.7 05/17/2016       Abd Film  Findings: Central line in SVC.  There is borderline cardiomegaly.  There is a pacer.  No obstruction, ileus, or perforation seen.      Electronically signed by: SANTOS LYNN  Date: 02/09/17  Time: 07:39         Comparison: None    Findings: Sonographic and color Doppler images were obtained of the left upper extremity dialysis graft.    There is excellent flow through the dialysis graft without occlusion or significant stenosis.  There is however an area of no flow involving the superficial aspect of the graft is 1 mm in thickness, which may related to the graft device itself or a small amount of nonocclusive thrombus.   Impression     Largely patent dialysis  "graft with an area of no flow at the superficial aspect, possibly related to the graft itself or a small amount of nonocclusive thrombus.  ______________________________________     Electronically signed by resident: RODRIGUEZ ANAYA MD  Date: 02/05/17  Time: 16:28            As the supervising and teaching physician, I personally reviewed the images and resident's interpretation and I agree with the findings.          Electronically signed by: MIGUEL BROWER MD  Date: 02/05/17  Time: 16:32     Encounter   View Encounter        [id number]  Accession # [id number]      Study:  XR CHEST 1 VIEW    Indication: Chest pain.    Comparison: Chest radiograph from 01/21/2017.    Findings:     XR CHEST 1 VIEW.    Mild cardiomegaly, unchanged.  Stable cardiac pacer.  Mild prominence of pulmonary vasculature.  Lungs are clear.  No pleural effusion.   Impression      No acute intrathoracic process.  Stable mild cardiomegaly.          Electronically signed by: MIGUEL BROWER MD  Date: 02/04/17  Time: 10:11        ASSESSMENT & PLAN:     HIGH RISK CONDITION(S):  This was a very difficult history taking process as the patient would not speak much, but instead her mother would on the patient's behalf. It remains unclear "what" her real issues are as the mother continued to discredit the providers and her current plan of therapy / treatment.   Have asked a PHN representative to confer with the patient and family (see their notes for more details in regards).  ? If depression is playing a role. Plans are to have [facility]  assigned to do a home visit and a depression screen. Results will be forwarded to her PCP for interventions as deemed appropriate on this case.   ? Psychosocial Issues: PHN- evaluation is pending and patient is in agreement with being evaluated.   Her VS are at her baseline (compared to IP and at time of discharge). No strong clinical exam findings to support a readmission on today, except for her " "reporting that she feels "tired". PHN Rep to follow up with patient via phone call on tomorrow.     Additionally, she has brought her home meds to clinic today for:  Medications Reconciliation:   I have reconciled the patient's home medications and discharge medications with the patient/family. I have updated all changes.  Refer to After-Visit Medication List.  *Of note she has:  Lipitor  Metoprolol  Plavix  Florinef (advised to not take as per the d/c instructions)  Midodrine  Neurontin  Miralax  Keppra  Prilosec  Prednisone (10 mg)  Renvala  Coumadin      Recent admission for Dialysis AV fistula malfunction / ESRD:   S/p Declotted on 2/7/2017  Continue HD treatment   (Tues, Thjosse, Sat)  - continue Renvela  *Candidly conveyed that would like to consider another dialysis center; unclear why, other than "having to sit for longer planned sessions by the nephrologist and they don't know whether to take off more fluid or not; but, always tired and drained afterwards"  Spoke with PHN about. They have addressed.           Pulmonary sarcoidosis:  (stable)  Hx of pulmonary sarcoidosis, on home oxygen (4-5 L ), follows with  (apt 3/6).    - continue uninterrupted Prednisone 10 mg daily   (In hospital, was on prednisone 20 mg, weaned to home dose 10 mg daily before discharge and plan to follow up with Dr. Harvey).   Resumed Prednisone 10 mg 2/9/2017.      Chronic Hypotension with history of Chronic Immunosuppression 2/2 Adrenal Insufficiency:   *Of note, she was treated with stress dose hydrocortisone in ICU while in the hospital.  - Continue Prednisone 10 mg daily, uninterrupted therapy   *Confirmed that this medication was in her medication bin brought to clinic today.   - Midodrine therapy   *Of note, blood pressure, at discharge, 93/50  During hospitalization: 80-90s / 40-50s       Current chronic use of systemic steroids:  Endocrine was consulted: Continue prednisone 10 mg daily and discharge on this dose per the " "recs of Endocrine  - follow up with Endo on 3/8/2017       History of Seizure disorder:  (stable and without any recent activity)  - Continue Keppra BID  *Mother doesn't understand why she is on this medication, despite continued teaching on it's purpose and the need to not stop, as it is use for long term management. She disagrees (??).           Paroxysmal atrial fibrillation:  - continue Coumadin therapy and follow up with Coumadin clinic on 2/20 for repeat INR  Most recent is still subtherapeutic at 1.5  - continue Lopressor with holding parameters, particularly on dialysis days; instructed to purchase a home blood pressure monitor and check every day.         Chronic combined systolic and diastolic heart failure:   No clinical evidence of cardiac decompensation      Chronic Shoulder Pain:  Refusing the Zanaflex; discontinued; states, "not working".   Would not commence to an oral ingested source  Offered Voltaren topical; agreed to trial      *Of note, offered [facility], declined.   States, "what they going do differently". Educated on their purpose (of the disciplines), declined.     Instructions for the patient:  1) Purchase a blood pressure cuff and start monitoring blood pressure, particularly on dialysis days.     2) Hold the Metoprolol if blood pressure < 90/50 on dialysis days.   *Note that this medication is for your A -Fib and although your blood pressures may be low, your HR needs to range between .  Today in clinic: 67 (Heartrate)       Future Appointments  Date Time Provider Department Center   2/17/2017 2:40 PM Silas Harrell III, MD [facility]   2/20/2017 1:00 PM VASCULAR, LAB [facility]AB [facility]   2/20/2017 1:45 PM Torrey Villafana MD [facility]ASCSUR [facility]   2/20/2017 2:00 PM LAB, APPOINTMENT [facility] LAB [facility]   3/6/2017 2:30 PM VINAY Harvey MD [facility]ULMSVC [facility]   3/8/2017 9:00 AM Cirilo Broussard MD [facility]N [facility]   4/28/2017 8:00 AM Carlos A CAMPOS " MD [facility] IM [facility] PC[facility]   4/28/2017 12:30 PM EKG, APPT [facility] EKG [facility]   4/28/2017 1:00 PM PACEMAKER, ICD [facility] ARRHYTH [facility]   4/28/2017 1:40 PM Bladimir Adorno MD [facility] ARRHYTH [facility]          Medication List          This list is accurate as of: 2/16/17  5:41 PM.  Always use your most recent med list.                     ACZONE 5 % topical gel   Generic drug:  dapsone       ammonium lactate 12 % lotion   Commonly known as:  LAC-HYDRIN       atorvastatin 80 MG tablet   Commonly known as:  LIPITOR   Take 1 tablet (80 mg total) by mouth nightly.       clopidogrel 75 mg tablet   Commonly known as:  PLAVIX   Take 1 tablet (75 mg total) by mouth once daily.       diclofenac sodium 1 % Gel   Apply 2 g topically every 8 (eight) hours as needed.       econazole nitrate 1 % cream       gabapentin 300 MG capsule   Commonly known as:  NEURONTIN   Take 1 capsule (300 mg total) by mouth 3 (three) times daily.       inulin-sorbitol 2 gram Chew   Take 2 tablets by mouth 2 (two) times daily.       ketorolac 0.5% 0.5 % Drop   Commonly known as:  ACULAR       lactulose 20 gram/30 mL Soln   Commonly known as:  CHRONULAC   Take 15 mLs (10 g total) by mouth 3 (three) times daily as needed (for constipation).       levetiracetam 500 MG Tab   Commonly known as:  KEPPRA   Take 1 tablet (500 mg total) by mouth 2 (two) times daily.       metoprolol tartrate 25 MG tablet   Commonly known as:  LOPRESSOR   Take 0.5 tablets (12.5 mg total) by mouth 2 (two) times daily.       midodrine 5 MG Tab   Commonly known as:  PROAMATINE   Take 3 tablets (15 mg total) by mouth every Tues, Thurs, Sat.       omeprazole 20 MG capsule   Commonly known as:  PRILOSEC   Take 1 capsule (20 mg total) by mouth once daily.       polyethylene glycol 17 gram/dose powder   Commonly known as:  GLYCOLAX   Take 17 g by mouth once daily. Dissolve in juice.       prednisoLONE acetate 1 % Drps   Commonly known as:  PRED FORTE       predniSONE 10 MG tablet    Commonly known as:  DELTASONE   Takes one 10 mg tablet every morning       RENVELA 800 mg Tab   Generic drug:  sevelamer carbonate   TAKE 1 TABLET BY MOUTH THREE TIMES DAILY WITH MEALS       warfarin 5 MG tablet   Commonly known as:  COUMADIN   TAKE 1 TABLET BY MOUTH EVERYDAY EXCEPT 1 AND 1/2 TABLETS ON MONDAY AND FRIDAY OR AS DIRECTED BY CLINIC            Where to Get Your Medications      These medications were sent to [facility] [id number] [place] [address] [address] [facility]  [address] [facility] [address]     Phone:  [phone]     diclofenac sodium 1 % Gel           Signing Physician:  TYRESE Jon

## 2017-02-16 NOTE — MR AVS SNAPSHOT
[facility]  [address]  [facility] [address]  Phone: [phone]                  Sisi Medel   2017 3:00 PM   Office Visit    Description:  Female : 1960   Provider:  TYRESE Duggan   Department:  [facility]           Reason for Visit     Hospital Follow Up           Diagnoses this Visit        Comments    Chronic respiratory failure with hypoxia    -  Primary     Immunosuppressed status         Chronic combined systolic and diastolic heart failure         Seizure disorder         Pulmonary sarcoidosis         Atrial fibrillation, persistent         Other specified hypotension         Essential hypertension         Paroxysmal atrial fibrillation         Long-term (current) use of anticoagulants         Current chronic use of systemic steroids                To Do List           Future Appointments        Provider Department Dept Phone    2017 2:40 PM Silas Harrell III, MD [facility] - Neurology [phone]    2017 1:00 PM VASCULAR, LAB [facility] - Vascular Laboratory [phone]    2017 1:45 PM Torrey Villafana MD [facility] - Vascular Surgery [phone]    2017 2:00 PM LAB, APPOINTMENT [place] [facility] [phone]    3/6/2017 2:30 PM VINAY Harvey MD [facility] - Pulmonary Services [phone]      Goals (5 Years of Data)     None       These Medications        Disp Refills Start End    diclofenac sodium 1 % Gel 100 g 0 2017    Apply 2 g topically every 8 (eight) hours as needed. - Topical    Pharmacy: [id number] - [facility] [address] [place] [address] [place] AT [facility] Ph #: [phone]         [facility]     [facility] -  Assistance  Registered nurses in the [facility] provide clinical advisement, health education, appointment booking, and other advisory services.  Call for  this free service at [phone].             Medications           Message regarding Medications     Verify the changes and/or additions to your medication regime listed below are the same as discussed with your clinician today.  If any of these changes or additions are incorrect, please notify your healthcare provider.        START taking these NEW medications        Refills    diclofenac sodium 1 % Gel 0    Sig: Apply 2 g topically every 8 (eight) hours as needed.    Class: Normal    Route: Topical      STOP taking these medications     tizanidine 4 mg Cap Take 1 capsule by mouth 2 (two) times daily as needed.           Verify that the below list of medications is an accurate representation of the medications you are currently taking.  If none reported, the list may be blank. If incorrect, please contact your healthcare provider. Carry this list with you in case of emergency.           Current Medications     ACZONE 5 % topical gel Apply to face every morning    ammonium lactate (LAC-HYDRIN) 12 % lotion Apply topically as needed (to legs).     atorvastatin (LIPITOR) 80 MG tablet Take 1 tablet (80 mg total) by mouth nightly.    clopidogrel (PLAVIX) 75 mg tablet Take 1 tablet (75 mg total) by mouth once daily.    diclofenac sodium 1 % Gel Apply 2 g topically every 8 (eight) hours as needed.    econazole nitrate 1 % cream     gabapentin (NEURONTIN) 300 MG capsule Take 1 capsule (300 mg total) by mouth 3 (three) times daily.    inulin-sorbitol 2 gram Chew Take 2 tablets by mouth 2 (two) times daily.    ketorolac 0.5% (ACULAR) 0.5 % Drop instill one drop into both eyes every morning    lactulose (CHRONULAC) 20 gram/30 mL Soln Take 15 mLs (10 g total) by mouth 3 (three) times daily as needed (for constipation).    levetiracetam (KEPPRA) 500 MG Tab Take 1 tablet (500 mg total) by mouth 2 (two) times daily.    metoprolol tartrate (LOPRESSOR) 25 MG tablet Take 0.5 tablets (12.5 mg total) by mouth 2 (two) times daily.  "   midodrine (PROAMATINE) 5 MG Tab Take 3 tablets (15 mg total) by mouth every Tues, Thurs, Sat.    omeprazole (PRILOSEC) 20 MG capsule Take 1 capsule (20 mg total) by mouth once daily.    polyethylene glycol (GLYCOLAX) 17 gram/dose powder Take 17 g by mouth once daily. Dissolve in juice.    prednisoLONE acetate (PRED FORTE) 1 % DrpS INSTILL ONE DROP INTO  LEFT EYE  TWICE A DAY    predniSONE (DELTASONE) 10 MG tablet Takes one 10 mg tablet every morning    RENVELA 800 mg Tab TAKE 1 TABLET BY MOUTH THREE TIMES DAILY WITH MEALS    warfarin (COUMADIN) 5 MG tablet TAKE 1 TABLET BY MOUTH EVERYDAY EXCEPT 1 AND 1/2 TABLETS ON MONDAY AND FRIDAY OR AS DIRECTED BY CLINIC           Clinical Reference Information           Your Vitals Were     BP Pulse Height Weight Last Period SpO2    91/43 (BP Location: Right arm, Patient Position: Sitting) 67 5' 8" (1.727 m) 116.2 kg (256 lb 2.8 oz) (LMP Unknown) 96%    BMI                38.95 kg/m2          Blood Pressure          Most Recent Value    BP  (!)  91/43      Allergies as of 2/16/2017     Bactrim [Sulfamethoxazole-trimethoprim]    Nsaids (Non-steroidal Anti-inflammatory Drug)      Immunizations Administered on Date of Encounter - 2/16/2017     None      Maintenance Dialysis History     Start End Type Comments Center    2/17/2016  [facility]            Current D[facility] Center Information     [facility] [address] Phone #:  [phone]    Contact:  [place]  [address] Fax #:  [phone]            [facility] Sign-Up     Activating your [facility] account is as easy as 1-2-3!     1) Visit [email or web address], select Sign Up Now, enter this activation code and your date of birth, then select Next.  [email or web address]  Expires: 2/26/2017  1:57 PM      2) Create a username and password to use when you visit [facility] in the future and select a security question in case you lose your password and select Next.    3) Enter your e-mail address and click Sign " Up!    Additional Information  If you have questions, please e-mail [email or web address] or call [phone] to talk to our [facility] staff. Remember, [facility] is NOT to be used for urgent needs. For medical emergencies, dial 911.         Instructions    1) Purchase a blood pressure cuff and start monitoring blood pressure, particularly on dialysis days.     2) Hold the Metoprolol if blood pressure < 90/50 on dialysis days.       [facility] Visit (Post Discharge Follow-up) Today:   - Our clinic physicians and nurses plan to follow the patient up for any medical issues in the [facility] for 30 days post discharge.    Future Appointments  Date Time Provider Department Center   2/17/2017 2:40 PM Silas Harrell III, MD [facility] NEURO [facility]   2/20/2017 1:00 PM VASCULAR, LAB [facility] VASCLAB [facility]   2/20/2017 1:45 PM Torrey Villafana MD [facility] VASCSUR [facility]   2/20/2017 2:00 PM LAB, APPOINTMENT [facility] LAB VNP [facility]   3/6/2017 2:30 PM VINAY Harvey MD [facility] PULMSVC [facility]   3/8/2017 9:00 AM Cirilo Broussard MD [facility] ENDOCRN [facility]   4/28/2017 8:00 AM Carlos A Caputo MD [facility] IM [facility] PCW   4/28/2017 12:30 PM EKG, APPT [facility] EKG [facility]   4/28/2017 1:00 PM PACEMAKER, ICD [facility] ARRHYTH [facility]   4/28/2017 1:40 PM Bladimir Adorno MD [facility] ARRHYTH [facility]              Language Assistance Services     ATTENTION: Language assistance services are available, free of charge. Please call [phone].      ATENCIÓN: Si habla español, tiene a muniz disposición servicios gratuitos de asistencia lingüística. Llame al [phone].     CHÚ Ý: N?u b?n nói Ti?ng Vi?t, có các d?ch v? h? tr? ngôn ng? mi?n phí dành cho b?n. G?i s? [phone].         [facility] complies with applicable Federal civil rights laws and does not discriminate on the basis of race, color, national origin, age, disability, or sex.

## 2017-02-17 ENCOUNTER — HOSPITAL ENCOUNTER (INPATIENT)
Facility: OTHER | Age: 57
LOS: 2 days | Discharge: HOME | DRG: 312 | End: 2017-02-19
Attending: EMERGENCY MEDICINE | Admitting: HOSPITALIST
Payer: MEDICARE

## 2017-02-17 DIAGNOSIS — D86.0 PULMONARY SARCOIDOSIS: Chronic | ICD-10-CM

## 2017-02-17 DIAGNOSIS — N18.6 ESRD ON HEMODIALYSIS: Chronic | ICD-10-CM

## 2017-02-17 DIAGNOSIS — E27.49 SECONDARY ADRENAL INSUFFICIENCY: ICD-10-CM

## 2017-02-17 DIAGNOSIS — R53.1 WEAKNESS GENERALIZED: ICD-10-CM

## 2017-02-17 DIAGNOSIS — I95.3 HEMODIALYSIS-ASSOCIATED HYPOTENSION: ICD-10-CM

## 2017-02-17 DIAGNOSIS — R53.1 WEAKNESS: ICD-10-CM

## 2017-02-17 DIAGNOSIS — J96.11 CHRONIC RESPIRATORY FAILURE WITH HYPOXIA: Chronic | ICD-10-CM

## 2017-02-17 DIAGNOSIS — Z79.01 LONG-TERM (CURRENT) USE OF ANTICOAGULANTS: ICD-10-CM

## 2017-02-17 DIAGNOSIS — R41.82 ALTERED MENTAL STATUS: ICD-10-CM

## 2017-02-17 DIAGNOSIS — D63.1 ANEMIA IN ESRD (END-STAGE RENAL DISEASE): Chronic | ICD-10-CM

## 2017-02-17 DIAGNOSIS — I95.9 HYPOTENSION, UNSPECIFIED HYPOTENSION TYPE: Primary | ICD-10-CM

## 2017-02-17 DIAGNOSIS — Z79.52 CURRENT CHRONIC USE OF SYSTEMIC STEROIDS: Chronic | ICD-10-CM

## 2017-02-17 DIAGNOSIS — I50.42 CHRONIC COMBINED SYSTOLIC AND DIASTOLIC HEART FAILURE: Chronic | ICD-10-CM

## 2017-02-17 DIAGNOSIS — Z99.2 ESRD ON HEMODIALYSIS: Chronic | ICD-10-CM

## 2017-02-17 DIAGNOSIS — N18.6 ANEMIA IN ESRD (END-STAGE RENAL DISEASE): Chronic | ICD-10-CM

## 2017-02-17 DIAGNOSIS — I95.9 HYPOTENSION: ICD-10-CM

## 2017-02-17 LAB
ALBUMIN SERPL BCP-MCNC: 3 G/DL
ALP SERPL-CCNC: 97 U/L
ALT SERPL W/O P-5'-P-CCNC: 8 U/L
ANION GAP SERPL CALC-SCNC: 15 MMOL/L
AST SERPL-CCNC: 17 U/L
BASOPHILS # BLD AUTO: 0.02 K/UL
BASOPHILS NFR BLD: 0.2 %
BILIRUB SERPL-MCNC: 0.4 MG/DL
BUN SERPL-MCNC: 16 MG/DL
CALCIUM SERPL-MCNC: 9.8 MG/DL
CHLORIDE SERPL-SCNC: 92 MMOL/L
CO2 SERPL-SCNC: 28 MMOL/L
CORTIS SERPL-MCNC: 11.4 UG/DL
CREAT SERPL-MCNC: 6 MG/DL
DIFFERENTIAL METHOD: ABNORMAL
EOSINOPHIL # BLD AUTO: 0.1 K/UL
EOSINOPHIL NFR BLD: 0.7 %
ERYTHROCYTE [DISTWIDTH] IN BLOOD BY AUTOMATED COUNT: 15.1 %
EST. GFR  (AFRICAN AMERICAN): 8 ML/MIN/1.73 M^2
EST. GFR  (NON AFRICAN AMERICAN): 7 ML/MIN/1.73 M^2
GLUCOSE SERPL-MCNC: 153 MG/DL
HCT VFR BLD AUTO: 32.2 %
HGB BLD-MCNC: 9.7 G/DL
INR PPP: 2
LACTATE SERPL-SCNC: 2.8 MMOL/L
LYMPHOCYTES # BLD AUTO: 2 K/UL
LYMPHOCYTES NFR BLD: 15.5 %
MCH RBC QN AUTO: 30 PG
MCHC RBC AUTO-ENTMCNC: 30.1 %
MCV RBC AUTO: 100 FL
MONOCYTES # BLD AUTO: 1.2 K/UL
MONOCYTES NFR BLD: 9.2 %
NEUTROPHILS # BLD AUTO: 9.4 K/UL
NEUTROPHILS NFR BLD: 73.6 %
PLATELET # BLD AUTO: 357 K/UL
PMV BLD AUTO: 10.8 FL
POTASSIUM SERPL-SCNC: 3.8 MMOL/L
PROT SERPL-MCNC: 7.2 G/DL
PROTHROMBIN TIME: 21.3 SEC
RBC # BLD AUTO: 3.23 M/UL
SODIUM SERPL-SCNC: 135 MMOL/L
WBC # BLD AUTO: 12.78 K/UL

## 2017-02-17 PROCEDURE — 93010 ELECTROCARDIOGRAM REPORT: CPT | Mod: ,,, | Performed by: INTERNAL MEDICINE

## 2017-02-17 PROCEDURE — 63600175 PHARM REV CODE 636 W HCPCS

## 2017-02-17 PROCEDURE — 83605 ASSAY OF LACTIC ACID: CPT

## 2017-02-17 PROCEDURE — 96374 THER/PROPH/DIAG INJ IV PUSH: CPT

## 2017-02-17 PROCEDURE — 80053 COMPREHEN METABOLIC PANEL: CPT

## 2017-02-17 PROCEDURE — 99285 EMERGENCY DEPT VISIT HI MDM: CPT | Mod: 25

## 2017-02-17 PROCEDURE — 63600175 PHARM REV CODE 636 W HCPCS: Performed by: EMERGENCY MEDICINE

## 2017-02-17 PROCEDURE — 20000000 HC ICU ROOM

## 2017-02-17 PROCEDURE — 96372 THER/PROPH/DIAG INJ SC/IM: CPT

## 2017-02-17 PROCEDURE — 82533 TOTAL CORTISOL: CPT

## 2017-02-17 PROCEDURE — 93005 ELECTROCARDIOGRAM TRACING: CPT

## 2017-02-17 PROCEDURE — 96361 HYDRATE IV INFUSION ADD-ON: CPT

## 2017-02-17 PROCEDURE — 99223 1ST HOSP IP/OBS HIGH 75: CPT | Mod: ,,, | Performed by: HOSPITALIST

## 2017-02-17 PROCEDURE — 25000003 PHARM REV CODE 250

## 2017-02-17 PROCEDURE — 85025 COMPLETE CBC W/AUTO DIFF WBC: CPT

## 2017-02-17 PROCEDURE — 85610 PROTHROMBIN TIME: CPT

## 2017-02-17 PROCEDURE — 25000003 PHARM REV CODE 250: Performed by: EMERGENCY MEDICINE

## 2017-02-17 RX ORDER — LEVETIRACETAM 500 MG/1
500 TABLET ORAL 2 TIMES DAILY
Status: DISCONTINUED | OUTPATIENT
Start: 2017-02-17 | End: 2017-02-19 | Stop reason: HOSPADM

## 2017-02-17 RX ORDER — SODIUM CHLORIDE 9 MG/ML
INJECTION, SOLUTION INTRAVENOUS CONTINUOUS
Status: DISCONTINUED | OUTPATIENT
Start: 2017-02-17 | End: 2017-02-17

## 2017-02-17 RX ORDER — FLUDROCORTISONE ACETATE 0.1 MG/1
100 TABLET ORAL DAILY
Status: DISCONTINUED | OUTPATIENT
Start: 2017-02-17 | End: 2017-02-18

## 2017-02-17 RX ORDER — HEPARIN SODIUM 5000 [USP'U]/ML
5000 INJECTION, SOLUTION INTRAVENOUS; SUBCUTANEOUS EVERY 8 HOURS
Status: DISCONTINUED | OUTPATIENT
Start: 2017-02-17 | End: 2017-02-19 | Stop reason: HOSPADM

## 2017-02-17 RX ORDER — PREDNISOLONE ACETATE 10 MG/ML
1 SUSPENSION/ DROPS OPHTHALMIC 3 TIMES DAILY
Status: DISCONTINUED | OUTPATIENT
Start: 2017-02-17 | End: 2017-02-19 | Stop reason: HOSPADM

## 2017-02-17 RX ORDER — TIZANIDINE HYDROCHLORIDE 4 MG/1
1 CAPSULE, GELATIN COATED ORAL 2 TIMES DAILY PRN
COMMUNITY
End: 2017-02-21

## 2017-02-17 RX ORDER — MELATONIN 10 MG
1 TABLET, SUBLINGUAL SUBLINGUAL DAILY
Status: DISCONTINUED | OUTPATIENT
Start: 2017-02-17 | End: 2017-02-17

## 2017-02-17 RX ORDER — ACETAMINOPHEN 325 MG/1
650 TABLET ORAL EVERY 8 HOURS PRN
Status: DISCONTINUED | OUTPATIENT
Start: 2017-02-17 | End: 2017-02-19 | Stop reason: HOSPADM

## 2017-02-17 RX ORDER — PREDNISONE 5 MG/1
10 TABLET ORAL DAILY
Status: DISCONTINUED | OUTPATIENT
Start: 2017-02-17 | End: 2017-02-19 | Stop reason: HOSPADM

## 2017-02-17 RX ORDER — GABAPENTIN 300 MG/1
300 CAPSULE ORAL 3 TIMES DAILY
Status: DISCONTINUED | OUTPATIENT
Start: 2017-02-17 | End: 2017-02-19 | Stop reason: HOSPADM

## 2017-02-17 RX ORDER — OXYCODONE HYDROCHLORIDE 5 MG/1
5 TABLET ORAL EVERY 4 HOURS PRN
Status: DISCONTINUED | OUTPATIENT
Start: 2017-02-17 | End: 2017-02-19 | Stop reason: HOSPADM

## 2017-02-17 RX ORDER — PANTOPRAZOLE SODIUM 40 MG/1
40 TABLET, DELAYED RELEASE ORAL DAILY
Status: DISCONTINUED | OUTPATIENT
Start: 2017-02-17 | End: 2017-02-19 | Stop reason: HOSPADM

## 2017-02-17 RX ORDER — ATORVASTATIN CALCIUM 20 MG/1
80 TABLET, FILM COATED ORAL NIGHTLY
Status: DISCONTINUED | OUTPATIENT
Start: 2017-02-17 | End: 2017-02-19 | Stop reason: HOSPADM

## 2017-02-17 RX ORDER — ONDANSETRON 8 MG/1
8 TABLET, ORALLY DISINTEGRATING ORAL EVERY 8 HOURS PRN
Status: DISCONTINUED | OUTPATIENT
Start: 2017-02-17 | End: 2017-02-19 | Stop reason: HOSPADM

## 2017-02-17 RX ORDER — MIDODRINE HYDROCHLORIDE 5 MG/1
15 TABLET ORAL
Status: DISCONTINUED | OUTPATIENT
Start: 2017-02-18 | End: 2017-02-18

## 2017-02-17 RX ORDER — WARFARIN SODIUM 5 MG/1
5 TABLET ORAL DAILY
Status: DISCONTINUED | OUTPATIENT
Start: 2017-02-17 | End: 2017-02-19 | Stop reason: HOSPADM

## 2017-02-17 RX ORDER — DOCUSATE SODIUM 100 MG/1
100 CAPSULE, LIQUID FILLED ORAL 2 TIMES DAILY PRN
Status: ON HOLD | COMMUNITY
End: 2017-05-15 | Stop reason: HOSPADM

## 2017-02-17 RX ORDER — SEVELAMER CARBONATE 800 MG/1
800 TABLET, FILM COATED ORAL
Status: DISCONTINUED | OUTPATIENT
Start: 2017-02-17 | End: 2017-02-19 | Stop reason: HOSPADM

## 2017-02-17 RX ORDER — OXYCODONE AND ACETAMINOPHEN 7.5; 325 MG/1; MG/1
1 TABLET ORAL EVERY 4 HOURS PRN
COMMUNITY
End: 2017-03-08

## 2017-02-17 RX ORDER — MIDODRINE HYDROCHLORIDE 5 MG/1
5 TABLET ORAL
Status: DISCONTINUED | OUTPATIENT
Start: 2017-02-19 | End: 2017-02-18

## 2017-02-17 RX ORDER — MELATONIN 10 MG
1 TABLET, SUBLINGUAL SUBLINGUAL DAILY
COMMUNITY
End: 2017-03-08

## 2017-02-17 RX ORDER — CLOPIDOGREL BISULFATE 75 MG/1
75 TABLET ORAL DAILY
Status: DISCONTINUED | OUTPATIENT
Start: 2017-02-17 | End: 2017-02-19 | Stop reason: HOSPADM

## 2017-02-17 RX ADMIN — PREDNISONE 10 MG: 5 TABLET ORAL at 04:02

## 2017-02-17 RX ADMIN — SODIUM CHLORIDE: 0.9 INJECTION, SOLUTION INTRAVENOUS at 03:02

## 2017-02-17 RX ADMIN — PANTOPRAZOLE SODIUM 40 MG: 40 TABLET, DELAYED RELEASE ORAL at 04:02

## 2017-02-17 RX ADMIN — LEVETIRACETAM 500 MG: 500 TABLET, FILM COATED ORAL at 09:02

## 2017-02-17 RX ADMIN — WARFARIN SODIUM 5 MG: 5 TABLET ORAL at 05:02

## 2017-02-17 RX ADMIN — SODIUM CHLORIDE: 9 INJECTION, SOLUTION INTRAVENOUS at 09:02

## 2017-02-17 RX ADMIN — HEPARIN SODIUM 5000 UNITS: 5000 INJECTION, SOLUTION INTRAVENOUS; SUBCUTANEOUS at 09:02

## 2017-02-17 RX ADMIN — PREDNISOLONE ACETATE 1 DROP: 10 SUSPENSION/ DROPS OPHTHALMIC at 09:02

## 2017-02-17 RX ADMIN — HYDROCORTISONE SODIUM SUCCINATE 100 MG: 100 INJECTION, POWDER, FOR SOLUTION INTRAMUSCULAR; INTRAVENOUS at 12:02

## 2017-02-17 RX ADMIN — FLUDROCORTISONE ACETATE 100 MCG: 0.1 TABLET ORAL at 03:02

## 2017-02-17 RX ADMIN — SODIUM CHLORIDE 500 ML: 0.9 INJECTION, SOLUTION INTRAVENOUS at 02:02

## 2017-02-17 RX ADMIN — HEPARIN SODIUM 5000 UNITS: 5000 INJECTION, SOLUTION INTRAVENOUS; SUBCUTANEOUS at 03:02

## 2017-02-17 RX ADMIN — CLOPIDOGREL 75 MG: 75 TABLET, FILM COATED ORAL at 04:02

## 2017-02-17 RX ADMIN — SODIUM CHLORIDE 500 ML: 0.9 INJECTION, SOLUTION INTRAVENOUS at 11:02

## 2017-02-17 RX ADMIN — ATORVASTATIN CALCIUM 80 MG: 20 TABLET, FILM COATED ORAL at 09:02

## 2017-02-17 RX ADMIN — SEVELAMER CARBONATE 800 MG: 800 TABLET, FILM COATED ORAL at 05:02

## 2017-02-17 RX ADMIN — GABAPENTIN 300 MG: 300 CAPSULE ORAL at 09:02

## 2017-02-17 NOTE — PLAN OF CARE
02/17/17 1713   Discharge Assessment   Assessment Type Discharge Planning Assessment   Assessment information obtained from? Patient;Medical Record   Communicated expected length of stay with patient/caregiver no   Prior to hospitilization cognitive status: Alert/Oriented   Prior to hospitalization functional status: Assistive Equipment   Current cognitive status: Alert/Oriented   Current Functional Status: Assistive Equipment   Arrived From home or self-care   Lives With significant other   Able to Return to Prior Arrangements unable to determine at this time (comments)   Is patient able to care for self after discharge? Unable to determine at this time (comments)   Do you have any problems affording any of your prescribed medications? TBD   On Dialysis? Yes   Discharge Plan A Home Health;Home with family

## 2017-02-17 NOTE — ED NOTES
Rounding on the pt has been done. Pt denies pain, is eating mealtray. Pt mother at bedside. No other complaints at this time. Pt AAOx4 and appropriate at this time. Respirations even and unlabored. No acute distress noted.  Awaiting further orders. Pt updated on POC. Bed is locked and in lowest position with side rails up x2. Call bell within reach and pt oriented to use of call bell. Pt on continuous cardiac monitoring, continuous pulse ox, and continuous BP cuff. Will continue to monitor.

## 2017-02-17 NOTE — ED NOTES
"Pt BP 71/50 via automatic cuff to RLE. Pt reporting this is her baseline BP stating, "my blood pressure is usually really low."    "

## 2017-02-17 NOTE — PLAN OF CARE
Discharge Planning:  Patient admitted on 2-17-17  LOS-day 0  Chart reviewed  Main campus admission (Dr Whitaker) chart also reviewed - readmit within 30 days  Care plan discussed  Discussed care plan with treatment team  Discussed care plan with the attending Dr Ponce  Current dispo -pending  Case management  to follow  Consults following are: nephrology, case mgt

## 2017-02-17 NOTE — ED NOTES
"Pt presents to ED with c/o recent onset of fatigue since this AM. Pt goes to dialysis T/Th/Sat. Reports going to dialysis yesterday. Pt with normal routine, getting ready for dr johnson this AM, and ambulating, pt states "I just got weak all of a sudden and I couldn't move my legs." Pt skin is cold, weakness noted to bilateral extremities. Pt reporting decreased sensation to RLE. Pt stating "I'm talking funny, aren't I talking funny?" pt with no slurred speech. Pt AAOx4 and appropriate at this time. Respirations even and unlabored. No acute distress noted. Pt denies pain. Awaiting further orders. Pt updated on POC. Bed is locked and in lowest position with side rails up x2. Call bell within reach and pt oriented to use of call bell. Pt on continuous cardiac monitoring, continuous pulse ox, and continuous BP cuff. Will continue to monitor.     "

## 2017-02-17 NOTE — IP AVS SNAPSHOT
[facility] Location ([facility])  [address] [place] [address]  Phone: [phone]           Patient Discharge Instructions     Our goal is to set you up for success. This packet includes information on your condition, medications, and your home care. It will help you to care for yourself so you don't get sicker and need to go back to the hospital.     Please ask your nurse if you have any questions.        There are many details to remember when preparing to leave the hospital. Here is what you will need to do:    1. Take your medicine. If you are prescribed medications, review your Medication List in the following pages. You may have new medications to  at the pharmacy and others that you'll need to stop taking. Review the instructions for how and when to take your medications. Talk with your doctor or nurses if you are unsure of what to do.     2. Go to your follow-up appointments. Specific follow-up information is listed in the following pages. Your may be contacted by a transition nurse or clinical provider about future appointments. Be sure we have all of the phone numbers to reach you, if needed. Please contact your provider's office if you are unable to make an appointment.     3. Watch for warning signs. Your doctor or nurse will give you detailed warning signs to watch for and when to call for assistance. These instructions may also include educational information about your condition. If you experience any of warning signs to your health, call your doctor.               ** Verify the list of medication(s) below is accurate and up to date. Carry this with you in case of emergency. If your medications have changed, please notify your healthcare provider.             Medication List      START taking these medications        Additional Info                      fludrocortisone 0.1 mg Tab   Commonly known as:  FLORINEF   Quantity:  60 tablet   Refills:  11   Dose:  100 mcg    Last  time this was given:  100 mcg on 2/19/2017  8:55 AM   Instructions:  Take 1 tablet (100 mcg total) by mouth 2 (two) times daily.     Begin Date    AM    Noon    PM    Bedtime         CHANGE how you take these medications        Additional Info                      * midodrine 5 MG Tab   Commonly known as:  PROAMATINE   Quantity:  32 tablet   Refills:  11   Dose:  10 mg   What changed:  You were already taking a medication with the same name, and this prescription was added. Make sure you understand how and when to take each.    Last time this was given:  10 mg on 2/19/2017  8:59 AM   Instructions:  Take 2 tablets (10 mg total) by mouth every Mon, Wed, Fri, Sun.     Begin Date    AM    Noon    PM    Bedtime       * midodrine 5 MG Tab   Commonly known as:  PROAMATINE   Refills:  0   Dose:  15 mg   What changed:  additional instructions    Start Date:  2/21/2017   Last time this was given:  10 mg on 2/19/2017  8:59 AM   Instructions:  Take 3 tablets (15 mg total) by mouth every Tues, Thurs, Sat. Take 2 tablets (10 mg total) by mouth on non dialysis days.     Begin Date    AM    Noon    PM    Bedtime       predniSONE 10 MG tablet   Commonly known as:  DELTASONE   Quantity:  60 tablet   Refills:  3   What changed:  additional instructions    Last time this was given:  10 mg on 2/19/2017  8:54 AM   Instructions:  Takes one 10 mg tablet every morning     Begin Date    AM    Noon    PM    Bedtime       * Notice:  This list has 2 medication(s) that are the same as other medications prescribed for you. Read the directions carefully, and ask your doctor or other care provider to review them with you.      CONTINUE taking these medications        Additional Info                      ACZONE 5 % topical gel   Refills:  2   Generic drug:  dapsone    Instructions:  Apply to face every morning     Begin Date    AM    Noon    PM    Bedtime       ammonium lactate 12 % lotion   Commonly known as:  LAC-HYDRIN   Refills:  2     Instructions:  Apply topically as needed (to legs).     Begin Date    AM    Noon    PM    Bedtime       atorvastatin 80 MG tablet   Commonly known as:  LIPITOR   Quantity:  90 tablet   Refills:  1   Dose:  80 mg    Last time this was given:  80 mg on 2/18/2017  9:16 PM   Instructions:  Take 1 tablet (80 mg total) by mouth nightly.     Begin Date    AM    Noon    PM    Bedtime       cholecalciferol (vitamin D3) 10,000 unit Tab   Refills:  0   Dose:  1 tablet    Instructions:  Take 1 tablet by mouth once daily.     Begin Date    AM    Noon    PM    Bedtime       clopidogrel 75 mg tablet   Commonly known as:  PLAVIX   Quantity:  30 tablet   Refills:  11   Dose:  75 mg    Last time this was given:  75 mg on 2/19/2017  8:54 AM   Instructions:  Take 1 tablet (75 mg total) by mouth once daily.     Begin Date    AM    Noon    PM    Bedtime       diclofenac sodium 1 % Gel   Quantity:  100 g   Refills:  0   Dose:  2 g    Instructions:  Apply 2 g topically every 8 (eight) hours as needed.     Begin Date    AM    Noon    PM    Bedtime       docusate sodium 100 MG capsule   Commonly known as:  COLACE   Refills:  0   Dose:  100 mg    Instructions:  Take 100 mg by mouth 2 (two) times daily as needed for Constipation.     Begin Date    AM    Noon    PM    Bedtime       econazole nitrate 1 % cream   Refills:  0    Instructions:  Apply topically 2 (two) times daily.     Begin Date    AM    Noon    PM    Bedtime       gabapentin 300 MG capsule   Commonly known as:  NEURONTIN   Quantity:  270 capsule   Refills:  0   Dose:  300 mg   Comments:  **Patient requests 90 days supply**    Last time this was given:  300 mg on 2/19/2017  6:21 AM   Instructions:  Take 1 capsule (300 mg total) by mouth 3 (three) times daily.     Begin Date    AM    Noon    PM    Bedtime       inulin-sorbitol 2 gram Chew   Refills:  0   Dose:  2 tablet    Instructions:  Take 2 tablets by mouth 2 (two) times daily.     Begin Date    AM    Noon    PM    Bedtime        ketorolac 0.5% 0.5 % Drop   Commonly known as:  ACULAR   Refills:  3    Instructions:  instill one drop into both eyes every morning     Begin Date    AM    Noon    PM    Bedtime       lactulose 20 gram/30 mL Soln   Commonly known as:  CHRONULAC   Quantity:  450 mL   Refills:  1   Dose:  10 g    Instructions:  Take 15 mLs (10 g total) by mouth 3 (three) times daily as needed (for constipation).     Begin Date    AM    Noon    PM    Bedtime       levetiracetam 500 MG Tab   Commonly known as:  KEPPRA   Quantity:  60 tablet   Refills:  5   Dose:  500 mg    Last time this was given:  500 mg on 2/19/2017  8:55 AM   Instructions:  Take 1 tablet (500 mg total) by mouth 2 (two) times daily.     Begin Date    AM    Noon    PM    Bedtime       metoprolol tartrate 25 MG tablet   Commonly known as:  LOPRESSOR   Quantity:  60 tablet   Refills:  11   Dose:  12.5 mg    Instructions:  Take 0.5 tablets (12.5 mg total) by mouth 2 (two) times daily.     Begin Date    AM    Noon    PM    Bedtime       omeprazole 20 MG capsule   Commonly known as:  PRILOSEC   Quantity:  30 capsule   Refills:  5   Dose:  20 mg    Instructions:  Take 1 capsule (20 mg total) by mouth once daily.     Begin Date    AM    Noon    PM    Bedtime       oxycodone-acetaminophen 7.5-325 mg per tablet   Commonly known as:  PERCOCET   Refills:  0   Dose:  1 tablet    Instructions:  Take 1 tablet by mouth every 4 (four) hours as needed for Pain.     Begin Date    AM    Noon    PM    Bedtime       polyethylene glycol 17 gram/dose powder   Commonly known as:  GLYCOLAX   Quantity:  510 g   Refills:  0   Dose:  17 g    Instructions:  Take 17 g by mouth once daily. Dissolve in juice.     Begin Date    AM    Noon    PM    Bedtime       prednisoLONE acetate 1 % Drps   Commonly known as:  PRED FORTE   Refills:  3    Last time this was given:  1 drop on 2/19/2017  6:21 AM   Instructions:  INSTILL ONE DROP INTO  LEFT EYE THREE TIMES A DAY     Begin Date    AM    Noon    PM     Bedtime       RENVELA 800 mg Tab   Quantity:  90 tablet   Refills:  0   Generic drug:  sevelamer carbonate    Last time this was given:  800 mg on 2/19/2017  8:40 AM   Instructions:  TAKE 1 TABLET BY MOUTH THREE TIMES DAILY WITH MEALS     Begin Date    AM    Noon    PM    Bedtime       tizanidine 4 mg Cap   Refills:  0   Dose:  1 capsule    Instructions:  Take 1 capsule by mouth 2 (two) times daily as needed (for muscle spasms).     Begin Date    AM    Noon    PM    Bedtime       warfarin 5 MG tablet   Commonly known as:  COUMADIN   Quantity:  189 tablet   Refills:  0   Comments:  **Patient requests 90 days supply**    Last time this was given:  5 mg on 2/18/2017  5:23 PM   Instructions:  TAKE 1 TABLET BY MOUTH EVERYDAY EXCEPT 1 AND 1/2 TABLETS ON MONDAY AND FRIDAY OR AS DIRECTED BY CLINIC     Begin Date    AM    Noon    PM    Bedtime            Where to Get Your Medications      These medications were sent to [facility] [address] [facility] [address] [facility] [address] [facility]  [address] [facility] [address]     Phone:  [phone]     fludrocortisone 0.1 mg Tab    midodrine 5 MG Tab         Information about where to get these medications is not yet available     ! Ask your nurse or doctor about these medications     midodrine 5 MG Tab                  Please bring to all follow up appointments:    1. A copy of your discharge instructions.  2. All medicines you are currently taking in their original bottles.  3. Identification and insurance card.    Please arrive 15 minutes ahead of scheduled appointment time.    Please call 24 hours in advance if you must reschedule your appointment and/or time.        Your Scheduled Appointments     Feb 20, 2017  1:00 PM CST   [facility] Hemodialysis AC with VASCULAR, LAB   [facility] ([facility] )    [address]  [facility] [address] [phone]            Feb 20, 2017  1:45 PM CST   Post  OP with MD [facility] - Vascular Surgery ([facility] )    [address]  [place] [address] [phone]            Feb 20, 2017  2:00 PM CST   Non-Fasting Lab with LAB, APPOINTMENT [place] [facility] ([facility])    [address] [facility] [address] [phone]            Mar 06, 2017  2:30 PM CST   Established Patient Visit with VINAY Harvey MD   [facility] - Pulmonary Services ([facility] )    [address]  [place] [address] [phone]            Mar 08, 2017  9:00 AM CST   Established Patient with Cirilo Broussard MD   [facility] - Endo/Diab/Metab ([facility] )    [address]  [place] [address] [phone]              Follow-up Information     Follow up with Carlos A Caputo MD.    Specialty:  Internal Medicine    Why:  Follow up in 1-2 weeks    Contact information:    [address]  [place] [id number] [phone]          Follow up with Aaron Ware MD.    Specialty:  Nephrology    Why:  Follow up for the next available appointment    Contact information:    [id number]  [facility] [id number] [phone]          Discharge Instructions     Future Orders    Activity as tolerated     Diet general     Questions:    Total calories:      Fat restriction, if any:      Protein restriction, if any:      Na restriction, if any:      Fluid restriction:      Additional restrictions:  Renal      Discharge References/Attachments     LOW BLOOD PRESSURE (HYPOTENSION), DISCHARGE INSTRUCTIONS (ENGLISH)    PD PERITONEAL DIALYSIS (ENGLISH)    HEMODIALYSIS ACCESS, CARING FOR YOUR (ENGLISH)        Primary Diagnosis     Your primary diagnosis was:  Low Blood Pressure During Dialysis      Admission Information     Date & Time Provider Department CSN    2/17/2017 11:12 AM Cara Ponce MD [facility] [id number]      Care Providers     Provider Role  "Specialty Primary office phone    Cara Ponce MD Attending Provider Internal Medicine [phone]    Aaron Ware MD Consulting Physician  Nephrology [phone]      Your Vitals Were     BP Pulse Temp Resp Height Weight    109/57 104 97.8 °F (36.6 °C) (Oral) 23 5' 8" (1.727 m) 111.1 kg (244 lb 14.9 oz)    Last Period SpO2 BMI          (LMP Unknown) 96% 37.24 kg/m2        Recent Lab Values        4/10/2012 8/22/2013 6/15/2015 10/15/2015 5/17/2016               3:00 PM 10:56 AM 11:06 AM  9:41 AM  9:46 AM       A1C 6.5 (H) 6.0 5.5 6.7 (H) 5.7               Allergies as of 2/19/2017        Reactions    Bactrim [Sulfamethoxazole-trimethoprim] Other (See Comments)    Causes renal failure    Nsaids (Non-steroidal Anti-inflammatory Drug) Other (See Comments)    Renal failure      [facility]     [facility] Nurse Care Line - 24/7 Assistance  Unless otherwise directed by your provider, please contact [facility], our nurse care line that is available for 24/7 assistance.     Registered nurses in the [facility] provide clinical advisement, health education, appointment booking, and other advisory services.  Call for this free service at [phone].        Advance Directives     An advance directive is a document which, in the event you are no longer able to make decisions for yourself, tells your healthcare team what kind of treatment you do or do not want to receive, or who you would like to make those decisions for you.  If you do not currently have an advance directive, [facility] encourages you to create one.  For more information call:  [phone]), [phone] ([phone]),  or log on to [email or web address].        Smoking Cessation     If you would like to quit smoking:   You may be eligible for free services if you are a [place] resident and started smoking cigarettes before September 1, 1988.  Call the Smoking Cessation Trust (SCT) toll free at (543) " 915-4088 or (079) 934-4274.   Call 1-800-QUIT-NOW if you do not meet the above criteria.            Language Assistance Services     ATTENTION: Language assistance services are available, free of charge. Please call [phone].      ATENCIÓN: Si nancy crawley, tiene a muniz disposición servicios gratuitos de asistencia lingüística. Llame al [phone].     CHÚ Ý: N?u b?n nói Ti?ng Vi?t, có các d?ch v? h? tr? ngôn ng? mi?n phí dành cho b?n. G?i s? [phone].        Heart Failure Education       Heart Failure: Being Active  You have a condition called heart failure. Being active doesnt mean that you have to wear yourself out. Even a little movement each day helps to strengthen your heart. If you cant get out to exercise, you can do simple stretching and strengthening exercises at home. These are good ways to keep you well-conditioned and prevent you and your heart from becoming excessively weak.    Ideas to get you started  · Add a little movement to things you do now. Walk to mail letters. Park your car at the far end of the parking lot and walk to the store. Walk up a flight of stairs instead of taking the elevator.  · Choose activities you enjoy. You might walk, swim, or ride an exercise bike. Things like gardening and washing the car count, too. Other possibilities include: washing dishes, walking the dog, walking around the mall, and doing aerobic activities with friends.  · Join a group exercise program at a [facility] or [facility], a senior center, or a community center. Or look into a hospital cardiac rehabilitation program. Ask your doctor if you qualify.  Tips to keep you going  · Get up and get dressed each day. Go to a coffee shop and read a newspaper or go somewhere that you'll be in the presence of other active people. Youll feel more like being active.  · Make a plan. Choose one or more activities that you enjoy and that you can easily do. Then plan to do at least one each day. You might write your  plan on a calendar.  · Go with a friend or a group if you like company. This can help you feel supported and stay motivated, too.  · Plan social events that you enjoy. This will keep you mentally engaged as well as physically motivated to do things you find pleasure in.  For your safety  · Talk with your healthcare provider before starting an exercise program.  · Exercise indoors when its too hot or too cold outside, or when the air quality is poor. Try walking at a shopping mall.  · Wear socks and sturdy shoes to maintain your balance and prevent falls.  · Start slowly. Do a few minutes several times a day at first. Increase your time and speed little by little.  · Stop and rest whenever you feel tired or get short of breath.  · Dont push yourself on days when you dont feel well.  Date Last Reviewed: 3/20/2016  © 9097-5813 [employer]. [address] [place] [address]. All rights reserved. This information is not intended as a substitute for professional medical care. Always follow your healthcare professional's instructions.              Heart Failure: Evaluating Your Heart  You have a condition called heart failure. To evaluate your condition, your doctor will examine you, ask questions, and do some tests. Along with looking for signs of heart failure, the doctor looks for any other health problems that may have led to heart failure. The results of your evaluation will help your doctor form a treatment plan.  Health history and physical exam  Your visit will start with a health history. Tell the doctor about any symptoms youve noticed and about all medicines you take. Then youll have a physical exam. This includes listening to your heartbeat and breathing. Youll also be checked for swelling (edema) in your legs and neck. When you have fluid buildup or fluid in the lungs, it may be called congestive heart failure.  Diagnosing heart failure     During an echocardiogram, sound waves  bounce off the heart. These are converted into a picture on the screen.   The following may be done to help your doctor form a diagnosis:  · X-rays show the size and shape of your heart. These pictures can also show fluid in your lungs.  · An electrocardiogram (ECG or EKG) shows the pattern of your heartbeat. Small pads (electrodes) are placed on your chest, arms, and legs. Wires connect the pads to the ECG machine, which records your hearts electrical signals. This can give the doctor information about heart function.  · An echocardiogram uses ultrasound waves to show the structure and movement of your heart muscle. This shows how well the heart pumps. It also shows the thickness of the heart walls, and if the heart is enlarged. It is one of the most useful, non-invasive tests as it provides information about the heart's general function. This helps your doctor make treatment decisions.  · Lab tests evaluate small amounts of blood or urine for signs of problems. A BNP lab test can help diagnose and evaluate heart failure. BNP stands for B-type natriuretic peptide. The ventricles secrete more BNP when heart failure worsens. Lab tests can also provide information about metabolic dysfunction or heart dysfunction.  Your treatment plan  Based on the results of your evaluation and tests, your doctor will develop a treatment plan. This plan is designed to relieve some of your heart failure symptoms and help make you more comfortable. Your treatment plan may include:  · Medicine to help your heart work better and improve your quality of life  · Changes in what you eat and drink to help prevent fluid from backing up in your body  · Daily monitoring of your weight and heart failure symptoms to see how well your treatment plan is working  · Exercise to help you stay healthy  · Help with quitting smoking  · Emotional and psychological support to help adjust to the changes  · Referrals to other specialists to make sure you are  being treated comprehensively  Date Last Reviewed: 3/21/2016  © 4589-7240 [employer]. [address] [place] [address], [place] [address]. All rights reserved. This information is not intended as a substitute for professional medical care. Always follow your healthcare professional's instructions.              Heart Failure: Making Changes to Your Diet  You have a condition called heart failure. When you have heart failure, excess fluid is more likely to build up in your body because your heart isn't working well. This makes the heart work harder to pump blood. Fluid buildup causes symptoms such as shortness of breath and swelling (edema). This is often referred to as congestive heart failure or CHF. Controlling the amount of salt (sodium) you eat may help stop fluid from building up. Your doctor may also tell you to reduce the amount of fluid you drink.  Reading food labels    Your healthcare provider will tell you how much sodium you can eat each day. Read food labels to keep track. Keep in mind that certain foods are high in salt. These include canned, frozen, and processed foods. Check the amount of sodium in each serving. Watch out for high-sodium ingredients. These include MSG (monosodium glutamate), baking soda, and sodium phosphate.   Eating less salt  Give yourself time to get used to eating less salt. It may take a little while. Here are some tips to help:  · Take the saltshaker off the table. Replace it with salt-free herb mixes and spices.  · Eat fresh or plain frozen vegetables. These have much less salt than canned vegetables.  · Choose low-sodium snacks like sodium-free pretzels, crackers, or air-popped popcorn.  · Dont add salt to your food when youre cooking. Instead, season your foods with pepper, lemon, garlic, or onion.  · When you eat out, ask that your food be cooked without added salt.  · Avoid eating fried foods as these often have a great deal of salt.  If youre told to limit  fluids  You may need to limit how much fluid you have to help prevent swelling. This includes anything that is liquid at room temperature, such as ice cream and soup. If your doctor tells you to limit fluid, try these tips:  · Measure drinks in a measuring cup before you drink them. This will help you meet daily goals.  · Chill drinks to make them more refreshing.  · Suck on frozen lemon wedges to quench thirst.  · Only drink when youre thirsty.  · Chew sugarless gum or suck on hard candy to keep your mouth moist.  · Weigh yourself daily to know if your body's fluid content is rising.  My sodium goal  Your healthcare provider may give you a sodium goal to meet each day. This includes sodium found in food as well as salt that you add. My goal is to eat no more than ___________ mg of sodium per day.     When to call your doctor  Call your doctor right away if you have any symptoms of worsening heart failure. These can include:  · Sudden weight gain  · Increased swelling of your legs or ankles  · Trouble breathing when youre resting or at night  · Increase in the number of pillows you have to sleep on  · Chest pain, pressure, discomfort, or pain in the jaw, neck, or back   Date Last Reviewed: 3/21/2016  © 0087-2876 [employer]. [address] [place] [address], [place] [address]. All rights reserved. This information is not intended as a substitute for professional medical care. Always follow your healthcare professional's instructions.              Heart Failure: Medicines to Help Your Heart    You have a condition called heart failure (also known as congestive heart failure, or CHF). Your doctor will likely prescribe medicines for heart failure and any underlying health problems you have. Most heart failure patients take one or more types of medicinen. Your healthcare provider will work to find the combination of medicines that works best for you.  Heart failure medicines  Here are the most common heart failure  medicines:  · ACE inhibitors lower blood pressure and decrease strain on the heart. This makes it easier for the heart to pump. Angiotensin receptor blockers have similar effects. These are prescribed for some patients instead of ACE inhibitors.  · Beta-blockers relieve stress on the heart. They also improve symptoms. They may also improve the heart's pumping action over time.  · Diuretics (also called water pills) help rid your body of excess water. This can help rid your body of swelling (edema). Having less fluid to pump means your heart doesnt have to work as hard. Some diuretics make your body lose a mineral called potassium. Your doctor will tell you if you need to take supplements or eat more foods high in potassium.  · Digoxin helps your heart pump with more strength. This helps your heart pump more blood with each beat. So, more oxygen-rich blood travels to the rest of the body.  · Aldosterone antagonists help alter hormones and decrease strain on the heart.  · Hydralazine and nitrates are two separate medicines used together to treat heart failure. They may come in one combination pill. They lower blood pressure and decrease how hard the heart has to pump.  Medicines for related conditions  Controlling other heart problems helps keep heart failure under control, too. Depending on other heart problems you have, medicines may be prescribed to:  · Lower blood pressure (antihypertensives).  · Lower cholesterol levels (statins).  · Prevent blood clots (anticoagulants or aspirin).  · Keep the heartbeat steady (antiarrhythmics).  Date Last Reviewed: 3/5/2016  © 7154-2490 [employer] [address] [place] [address], [place] [address]. All rights reserved. This information is not intended as a substitute for professional medical care. Always follow your healthcare professional's instructions.              Heart Failure: Procedures That May Help    The heart is a muscle that pumps oxygen-rich blood to all  parts of the body. When you have heart failure, the heart is not able to pump as well as it should. Blood and fluid may back up into the lungs (congestive heart failure), and some parts of the body dont get enough oxygen-rich blood to work normally. These problems lead to the symptoms of heart failure.     Certain procedures may help the heart pump better in some cases of heart failure. Some procedures are done to treat health problems that may have caused the heart failure such as coronary artery disease or heart rhythm problems. For more serious heart failure, other options are available.  Treating artery and valve problems  If you have coronary artery disease or valve disease, procedures may be done to improve blood flow. This helps the heart pump better, which can improve heart failure symptoms. First, your doctor may do a cardiac catheterization to help detect clogged blood vessels or valve damage. During this procedure, a  thin tube (catheter) in inserted into a blood vessel and guided to the heart. There a dye is injected and a special type of X-ray (angiogram) is taken of the blood vessels. Procedures to open a blocked artery or fix damaged valves can also be done using catheterization.  · Angioplasty uses a balloon-tipped instrument at the end of the catheter. The balloon is inflated to widen the narrowed artery. In many cases, a stent is expanded to further support the narrowed artery. A stent is a metal mesh tube.  · Valve surgery repairs or replacement of faulty valves can also be done during catheterization so blood can flow properly through the chambers of the heart.  Bypass surgery is another option to help treat blocked arteries. It uses a healthy blood vessel from elsewhere in the body. The healthy blood vessel is attached above and below the blocked area so that blood can flow around the blocked artery.  Treating heart rhythm problems  A device may be placed in the chest to help a weak heart  maintain a healthy, heartbeat so the heart can pump more effectively:  · Pacemaker. A pacemaker is an implanted device that regulates your heartbeat electronically. It monitors your heart's rhythm and generates a painless electric impulse that helps the heart beat in a regular rhythm. A pacemaker is programmed to meet your specific heart rhythm needs.  · Biventricular pacing/cardiac resynchronization therapy. A type of pacemaker that paces both pumping chambers of the heart at the same time to coordinate contractions and to improve the heart's function. Some people with heart failure are candidates for this therapy.  · Implantable cardioverter defibrillator. A device similar to a pacemaker that senses when the heart is beating too fast and delivers an electrical shock to convert the fast rhythm to a normal rhythm. This can be a life saving device.  In severe cases  In more serious cases of heart failure when other treatments no longer work, other options may include:  · Ventricular assist devices (VADs). These are mechanical devices used to take over the pumping function for one or both of the heart's ventricles, or pumping chambers. A VAD may be necessary when heart failure progresses to the point that medicines and other treatments no longer help. In some cases, a VAD may be used as a bridge to transplant.  · Heart transplant. This is replacing the diseased heart with a healthy one from a donor. This is an option for a few people who are very sick. A heart transplant is very serious and not an option for all patients. Your doctor can tell you more.  Date Last Reviewed: 3/20/2016  © 0727-3322 [employer]. [address] [place] [address], [place] [address]. All rights reserved. This information is not intended as a substitute for professional medical care. Always follow your healthcare professional's instructions.              Heart Failure: Tracking Your Weight  You have a condition called heart failure. When  you have heart failure, a sudden weight gain or a steady rise in weight is a warning sign that your body is retaining too much water and salt. This could mean your heart failure is getting worse. If left untreated, it can cause problems for your lungs and result in shortness of breath. Weighing yourself each day is the best way to know if youre retaining water. If your weight goes up quickly, call your doctor. You will be given instructions on how to get rid of the excess water. You will likely need medicines and to avoid salt. This will help your heart work better.  Call your doctor if you gain more than 2 pounds in 1 day, more than 5 pounds in 1 week, or whatever weight gain you were told to report by your doctor. This is often a sign of worsening heart failure and needs to be evaluated and treated. Your doctor will tell you what to do next.   Tips for weighing yourself    · Weigh yourself at the same time each morning, wearing the same clothes. Weigh yourself after urinating and before eating.  · Use the same scale each day. Make sure the numbers are easy to read. Put the scale on a flat, hard surface -- not on a rug or carpet.  · Do not stop weighing yourself. If you forget one day, weigh again the next morning.  How to use your weight chart  · Keep your weight chart near the scale. Write your weight on the chart as soon as you get off the scale.  · Fill in the month and the start date on the chart. Then write down your weight each day. Your chart will look like this:    · If you miss a day, leave the space blank. Weigh yourself the next day and write your weight in the next space.  · Take your weight chart with you when you go to see your doctor.  Date Last Reviewed: 3/20/2016  © 5216-8432 [employer]. [address] [place] [address], [place] [address]. All rights reserved. This information is not intended as a substitute for professional medical care. Always follow your healthcare professional's  instructions.              Heart Failure: Warning Signs of a Flare-Up  You have a condition called heart failure. Once you have heart failure, flare-ups can happen. Below are signs that can mean your heart failure is getting worse. If you notice any of these warning signs, call your healthcare provider.  Swelling    · Your feet, ankles, or lower legs get puffier.  · You notice skin changes on your lower legs.  · Your shoes feel too tight.  · Your clothes are tighter in the waist.  · You have trouble getting rings on or off your fingers.  Shortness of breath  · You have to breathe harder even when youre doing your normal activities or when youre resting.  · You are short of breath walking up stairs or even short distances.  · You wake up at night short of breath or coughing.  · You need to use more pillows or sit up to sleep.  · You wake up tired or restless.  Other warning signs  · You feel weaker, dizzy, or more tired.  · You have chest pain or changes in your heartbeat.  · You have a cough that wont go away.  · You cant remember things or dont feel like eating.  Tracking your weight  Gaining weight is often the first warning sign that heart failure is getting worse. Gaining even a few pounds can be a sign that your body is retaining excess water and salt. Weighing yourself each day in the morning after you urinate and before you eat, is the best way to know if you're retaining water. Get a scale that is easy to read and make sure you wear the same clothes and use the same scale every time you weigh. Your healthcare provider will show you how to track your weight. Call your doctor if you gain more than 2 pounds in 1 day, 5 pounds in 1 week, or whatever weight gain you were told to report by your doctor. This is often a sign of worsening heart failure and needs to be evaluated and treated before it compromises your breathing. Your doctor will tell you what to do next.    Date Last Reviewed: 3/15/2016  ©  8981-7638 [employer]. [address] [place] [address], [place] [address]. All rights reserved. This information is not intended as a substitute for professional medical care. Always follow your healthcare professional's instructions.              Pneumonmia Discharge Instructions                Coumadin Discharge Instructions                         Chronic Kindey Disease Education             [facility] Sign-Up     Activating your [facility] account is as easy as 1-2-3!     1) Visit [email or web address], select Sign Up Now, enter this activation code and your date of birth, then select Next.  [email or web address]  Expires: 2/26/2017  1:57 PM      2) Create a username and password to use when you visit [facility] in the future and select a security question in case you lose your password and select Next.    3) Enter your e-mail address and click Sign Up!    Additional Information  If you have questions, please e-mail [email or web address] or call [phone] to talk to our [facility] staff. Remember, [facility] is NOT to be used for urgent needs. For medical emergencies, dial 911.          [facility] complies with applicable Federal civil rights laws and does not discriminate on the basis of race, color, national origin, age, disability, or sex.

## 2017-02-18 LAB
ALBUMIN SERPL BCP-MCNC: 2.8 G/DL
ALP SERPL-CCNC: 86 U/L
ALT SERPL W/O P-5'-P-CCNC: 7 U/L
ANION GAP SERPL CALC-SCNC: 13 MMOL/L
AST SERPL-CCNC: 21 U/L
BASOPHILS # BLD AUTO: 0 K/UL
BASOPHILS NFR BLD: 0 %
BILIRUB SERPL-MCNC: 0.3 MG/DL
BUN SERPL-MCNC: 27 MG/DL
CALCIUM SERPL-MCNC: 9.2 MG/DL
CHLORIDE SERPL-SCNC: 95 MMOL/L
CO2 SERPL-SCNC: 24 MMOL/L
CREAT SERPL-MCNC: 7.2 MG/DL
DIFFERENTIAL METHOD: ABNORMAL
EOSINOPHIL # BLD AUTO: 0 K/UL
EOSINOPHIL NFR BLD: 0.1 %
ERYTHROCYTE [DISTWIDTH] IN BLOOD BY AUTOMATED COUNT: 15.2 %
EST. GFR  (AFRICAN AMERICAN): 7 ML/MIN/1.73 M^2
EST. GFR  (NON AFRICAN AMERICAN): 6 ML/MIN/1.73 M^2
GIANT PLATELETS BLD QL SMEAR: PRESENT
GLUCOSE SERPL-MCNC: 164 MG/DL
HCT VFR BLD AUTO: 29 %
HGB BLD-MCNC: 8.8 G/DL
LYMPHOCYTES # BLD AUTO: 1.1 K/UL
LYMPHOCYTES NFR BLD: 8.6 %
MCH RBC QN AUTO: 30.1 PG
MCHC RBC AUTO-ENTMCNC: 30.3 %
MCV RBC AUTO: 99 FL
MONOCYTES # BLD AUTO: 1 K/UL
MONOCYTES NFR BLD: 8.1 %
NEUTROPHILS # BLD AUTO: 10.2 K/UL
NEUTROPHILS NFR BLD: 83.2 %
PLATELET # BLD AUTO: 384 K/UL
PLATELET BLD QL SMEAR: ABNORMAL
PMV BLD AUTO: 10.5 FL
POLYCHROMASIA BLD QL SMEAR: ABNORMAL
POTASSIUM SERPL-SCNC: 4.7 MMOL/L
PROT SERPL-MCNC: 6.7 G/DL
RBC # BLD AUTO: 2.92 M/UL
SODIUM SERPL-SCNC: 132 MMOL/L
WBC # BLD AUTO: 12.36 K/UL

## 2017-02-18 PROCEDURE — 36415 COLL VENOUS BLD VENIPUNCTURE: CPT

## 2017-02-18 PROCEDURE — 20000000 HC ICU ROOM

## 2017-02-18 PROCEDURE — 25000003 PHARM REV CODE 250: Performed by: INTERNAL MEDICINE

## 2017-02-18 PROCEDURE — 63600175 PHARM REV CODE 636 W HCPCS: Performed by: INTERNAL MEDICINE

## 2017-02-18 PROCEDURE — 63600175 PHARM REV CODE 636 W HCPCS

## 2017-02-18 PROCEDURE — 99233 SBSQ HOSP IP/OBS HIGH 50: CPT | Mod: ,,, | Performed by: HOSPITALIST

## 2017-02-18 PROCEDURE — 80100016 HC MAINTENANCE HEMODIALYSIS

## 2017-02-18 PROCEDURE — 80053 COMPREHEN METABOLIC PANEL: CPT

## 2017-02-18 PROCEDURE — 25000003 PHARM REV CODE 250: Performed by: HOSPITALIST

## 2017-02-18 PROCEDURE — 85025 COMPLETE CBC W/AUTO DIFF WBC: CPT

## 2017-02-18 PROCEDURE — 25000003 PHARM REV CODE 250

## 2017-02-18 RX ORDER — FLUDROCORTISONE ACETATE 0.1 MG/1
100 TABLET ORAL 2 TIMES DAILY
Qty: 60 TABLET | Refills: 11 | Status: SHIPPED | OUTPATIENT
Start: 2017-02-18 | End: 2017-03-20

## 2017-02-18 RX ORDER — MIDODRINE HYDROCHLORIDE 5 MG/1
15 TABLET ORAL
Status: DISCONTINUED | OUTPATIENT
Start: 2017-02-18 | End: 2017-02-19 | Stop reason: HOSPADM

## 2017-02-18 RX ORDER — ALBUMIN HUMAN 250 G/1000ML
25 SOLUTION INTRAVENOUS DAILY PRN
Status: DISCONTINUED | OUTPATIENT
Start: 2017-02-18 | End: 2017-02-19 | Stop reason: HOSPADM

## 2017-02-18 RX ORDER — MIDODRINE HYDROCHLORIDE 5 MG/1
10 TABLET ORAL
Status: DISCONTINUED | OUTPATIENT
Start: 2017-02-19 | End: 2017-02-19

## 2017-02-18 RX ORDER — SODIUM CHLORIDE 9 MG/ML
INJECTION, SOLUTION INTRAVENOUS ONCE
Status: COMPLETED | OUTPATIENT
Start: 2017-02-18 | End: 2017-02-18

## 2017-02-18 RX ORDER — FLUDROCORTISONE ACETATE 0.1 MG/1
100 TABLET ORAL 2 TIMES DAILY
Status: DISCONTINUED | OUTPATIENT
Start: 2017-02-18 | End: 2017-02-19 | Stop reason: HOSPADM

## 2017-02-18 RX ORDER — SODIUM CHLORIDE 9 MG/ML
INJECTION, SOLUTION INTRAVENOUS
Status: DISCONTINUED | OUTPATIENT
Start: 2017-02-18 | End: 2017-02-19 | Stop reason: HOSPADM

## 2017-02-18 RX ORDER — MIDODRINE HYDROCHLORIDE 5 MG/1
10 TABLET ORAL
Qty: 32 TABLET | Refills: 11 | Status: SHIPPED | OUTPATIENT
Start: 2017-02-19 | End: 2018-06-22

## 2017-02-18 RX ADMIN — PREDNISOLONE ACETATE 1 DROP: 10 SUSPENSION/ DROPS OPHTHALMIC at 01:02

## 2017-02-18 RX ADMIN — GABAPENTIN 300 MG: 300 CAPSULE ORAL at 05:02

## 2017-02-18 RX ADMIN — SEVELAMER CARBONATE 800 MG: 800 TABLET, FILM COATED ORAL at 11:02

## 2017-02-18 RX ADMIN — PANTOPRAZOLE SODIUM 40 MG: 40 TABLET, DELAYED RELEASE ORAL at 08:02

## 2017-02-18 RX ADMIN — PREDNISONE 10 MG: 5 TABLET ORAL at 08:02

## 2017-02-18 RX ADMIN — HYDROCORTISONE SODIUM SUCCINATE 50 MG: 100 INJECTION, POWDER, FOR SOLUTION INTRAMUSCULAR; INTRAVENOUS at 01:02

## 2017-02-18 RX ADMIN — SEVELAMER CARBONATE 800 MG: 800 TABLET, FILM COATED ORAL at 05:02

## 2017-02-18 RX ADMIN — PREDNISOLONE ACETATE 1 DROP: 10 SUSPENSION/ DROPS OPHTHALMIC at 05:02

## 2017-02-18 RX ADMIN — HEPARIN SODIUM 5000 UNITS: 5000 INJECTION, SOLUTION INTRAVENOUS; SUBCUTANEOUS at 01:02

## 2017-02-18 RX ADMIN — WARFARIN SODIUM 5 MG: 5 TABLET ORAL at 05:02

## 2017-02-18 RX ADMIN — Medication 1 CAPSULE: at 01:02

## 2017-02-18 RX ADMIN — PREDNISOLONE ACETATE 1 DROP: 10 SUSPENSION/ DROPS OPHTHALMIC at 09:02

## 2017-02-18 RX ADMIN — SODIUM CHLORIDE: 0.9 INJECTION, SOLUTION INTRAVENOUS at 01:02

## 2017-02-18 RX ADMIN — FLUDROCORTISONE ACETATE 100 MCG: 0.1 TABLET ORAL at 09:02

## 2017-02-18 RX ADMIN — MIDODRINE HYDROCHLORIDE 15 MG: 5 TABLET ORAL at 01:02

## 2017-02-18 RX ADMIN — SEVELAMER CARBONATE 800 MG: 800 TABLET, FILM COATED ORAL at 08:02

## 2017-02-18 RX ADMIN — HEPARIN SODIUM 5000 UNITS: 5000 INJECTION, SOLUTION INTRAVENOUS; SUBCUTANEOUS at 09:02

## 2017-02-18 RX ADMIN — GABAPENTIN 300 MG: 300 CAPSULE ORAL at 01:02

## 2017-02-18 RX ADMIN — GABAPENTIN 300 MG: 300 CAPSULE ORAL at 09:02

## 2017-02-18 RX ADMIN — LEVETIRACETAM 500 MG: 500 TABLET, FILM COATED ORAL at 08:02

## 2017-02-18 RX ADMIN — CLOPIDOGREL 75 MG: 75 TABLET, FILM COATED ORAL at 08:02

## 2017-02-18 RX ADMIN — ERYTHROPOIETIN 10000 UNITS: 10000 INJECTION, SOLUTION INTRAVENOUS; SUBCUTANEOUS at 01:02

## 2017-02-18 RX ADMIN — ATORVASTATIN CALCIUM 80 MG: 20 TABLET, FILM COATED ORAL at 09:02

## 2017-02-18 RX ADMIN — SODIUM CHLORIDE: 9 INJECTION, SOLUTION INTRAVENOUS at 05:02

## 2017-02-18 RX ADMIN — LEVETIRACETAM 500 MG: 500 TABLET, FILM COATED ORAL at 09:02

## 2017-02-18 RX ADMIN — HEPARIN SODIUM 5000 UNITS: 5000 INJECTION, SOLUTION INTRAVENOUS; SUBCUTANEOUS at 05:02

## 2017-02-18 RX ADMIN — FLUDROCORTISONE ACETATE 100 MCG: 0.1 TABLET ORAL at 08:02

## 2017-02-18 NOTE — ASSESSMENT & PLAN NOTE
- Dialyzes TTS at [facility].  - Chronically low BP associated with HD and possibly with adrenal insufficiency  - She is back to her normal low BP and may be dialyzed as usual tomorrow.  - [facility] consulted.

## 2017-02-18 NOTE — SUBJECTIVE & OBJECTIVE
Past Medical History   Diagnosis Date    Anemia in ESRD (end-stage renal disease) 3/7/2016    Anticoagulant long-term use     Cervical radiculopathy 2015    Chronic combined systolic and diastolic heart failure 2013     EF 20% , improved with Medical therapy, negative PET     Chronic respiratory failure with hypoxia 2013     On home oxygen at 2-5 liters    Closed fracture of proximal end of right fibula 2016    Complications due to renal dialysis device, implant, and graft     DDD (degenerative disc disease), lumbar 2015    ESRD on hemodialysis 2016    Essential hypertension     Gout     Lateral meniscal tear 2016    Lumbar stenosis 2015    Paroxysmal atrial fibrillation 2014     Not on anticoagulation    Peripheral neuropathy 2013    Personal history of gastric ulcer 3/19/2013    Sarcoidosis, lung 2009     Diagnosed in  with ocular manifestation, on 4L home O2 and PO steroids    Secondary pulmonary hypertension 2015    Seizure disorder 3/19/2013    Shingles 2013    Thyroid disease        Past Surgical History   Procedure Laterality Date    Abdominal surgery      Vascular surgery       section       x2    Other surgical history       loop recorder implant       Review of patient's allergies indicates:   Allergen Reactions    Bactrim [sulfamethoxazole-trimethoprim] Other (See Comments)     Causes renal failure    Nsaids (non-steroidal anti-inflammatory drug) Other (See Comments)     Renal failure       No current facility-administered medications on file prior to encounter.      Current Outpatient Prescriptions on File Prior to Encounter   Medication Sig    ACZONE 5 % topical gel Apply to face every morning    ammonium lactate (LAC-HYDRIN) 12 % lotion Apply topically as needed (to legs).     atorvastatin (LIPITOR) 80 MG tablet Take 1 tablet (80 mg total) by mouth nightly.    clopidogrel (PLAVIX) 75 mg  tablet Take 1 tablet (75 mg total) by mouth once daily.    econazole nitrate 1 % cream Apply topically 2 (two) times daily.     gabapentin (NEURONTIN) 300 MG capsule Take 1 capsule (300 mg total) by mouth 3 (three) times daily.    ketorolac 0.5% (ACULAR) 0.5 % Drop instill one drop into both eyes every morning    levetiracetam (KEPPRA) 500 MG Tab Take 1 tablet (500 mg total) by mouth 2 (two) times daily.    metoprolol tartrate (LOPRESSOR) 25 MG tablet Take 0.5 tablets (12.5 mg total) by mouth 2 (two) times daily.    midodrine (PROAMATINE) 5 MG Tab Take 3 tablets (15 mg total) by mouth every Tues, Thurs, Sat.    omeprazole (PRILOSEC) 20 MG capsule Take 1 capsule (20 mg total) by mouth once daily.    polyethylene glycol (GLYCOLAX) 17 gram/dose powder Take 17 g by mouth once daily. Dissolve in juice.    prednisoLONE acetate (PRED FORTE) 1 % DrpS INSTILL ONE DROP INTO  LEFT EYE THREE TIMES A DAY    predniSONE (DELTASONE) 10 MG tablet Takes one 10 mg tablet every morning (Patient taking differently: Takes one 10 mg tablet every morning with breakfast)    RENVELA 800 mg Tab TAKE 1 TABLET BY MOUTH THREE TIMES DAILY WITH MEALS    warfarin (COUMADIN) 5 MG tablet TAKE 1 TABLET BY MOUTH EVERYDAY EXCEPT 1 AND 1/2 TABLETS ON MONDAY AND FRIDAY OR AS DIRECTED BY CLINIC    diclofenac sodium 1 % Gel Apply 2 g topically every 8 (eight) hours as needed.    inulin-sorbitol 2 gram Chew Take 2 tablets by mouth 2 (two) times daily.    lactulose (CHRONULAC) 20 gram/30 mL Soln Take 15 mLs (10 g total) by mouth 3 (three) times daily as needed (for constipation).     Family History     Problem Relation (Age of Onset)    Coronary artery disease Father    Diabetes Mother, Father, Maternal Grandmother    Hypertension Mother, Father    Kidney failure Mother    Lupus Sister        Social History Main Topics    Smoking status: Former Smoker     Packs/day: 0.50     Years: 10.00     Types: Cigarettes     Quit date: 4/22/1994     Smokeless tobacco: Never Used    Alcohol use No    Drug use: No    Sexual activity: Not on file     Review of Systems   Constitutional: Negative for chills and fever.   HENT: Negative for congestion, rhinorrhea and sore throat.    Respiratory: Positive for shortness of breath. Negative for cough, chest tightness and wheezing.    Cardiovascular: Negative for chest pain and palpitations.   Gastrointestinal: Negative for abdominal pain, constipation, diarrhea and vomiting.   Endocrine: Negative for polydipsia and polyphagia.   Genitourinary:        Patient anuric.   Neurological: Positive for speech difficulty, weakness, light-headedness and headaches.   Psychiatric/Behavioral: Negative for confusion and dysphoric mood.     Objective:     Vital Signs (Most Recent):  Temp: 98.2 °F (36.8 °C) (02/17/17 1142)  Pulse: 84 (02/17/17 1809)  Resp: 20 (02/17/17 1809)  BP: (!) 100/58 (02/17/17 1803)  SpO2: 99 % (02/17/17 1809) Vital Signs (24h Range):  Temp:  [97.8 °F (36.6 °C)-98.2 °F (36.8 °C)] 98.2 °F (36.8 °C)  Pulse:  [72-84] 84  Resp:  [12-22] 20  SpO2:  [98 %-100 %] 99 %  BP: ()/(33-63) 100/58     Weight: 81.6 kg (180 lb)  Body mass index is 27.37 kg/(m^2).    Physical Exam   Constitutional: She is oriented to person, place, and time. She appears well-developed. No distress.   She obese.   HENT:   Head: Normocephalic and atraumatic.   Eyes: Conjunctivae are normal. Pupils are equal, round, and reactive to light.   Neck: Normal range of motion. Neck supple. No thyromegaly present.   Neck is thick and thyroid difficult to evaluate.   Cardiovascular:   Irregularly irregular rhythm with HR 76.  No murmur appreciated.   Pulmonary/Chest: Effort normal and breath sounds normal. She has no wheezes. She has no rales.   Abdominal: Soft. Bowel sounds are normal. She exhibits no distension. There is no tenderness.   Musculoskeletal: She exhibits no edema or deformity.   LUE AVF with bruit, light thrill.   Neurological: She is  alert and oriented to person, place, and time.   Skin: Skin is warm and dry.   Skin graft scars RUE.  Both lower extremities with healed wounds.     Psychiatric: She has a normal mood and affect. Her behavior is normal.        Significant Labs: All pertinent labs within the past 24 hours have been reviewed.    Significant Imaging: I have reviewed all pertinent imaging results/findings within the past 24 hours.

## 2017-02-18 NOTE — ASSESSMENT & PLAN NOTE
- As above, patient bolused with IVF and stress steroids started.  - Continue midodrine as prescribed  - Resume fludrocortisone as it seems to have helped her (although endocrinology evaluation has been started and they feel she does not need it).  - Elevated lactic acid does not appear to be from infectious cause - check procalcitonin level.

## 2017-02-18 NOTE — NURSING
Pt arrived to the unit in good spirits via RN transport. Pt is A&OX4, on 2L NC with o2 sats >95%... Pt VSS.... SOB noted on exertion, numbness reported in R leg. ... Will continue to monitor.

## 2017-02-18 NOTE — H&P
[facility] Hospital Medicine  History & Physical    Patient Name: Sisi Medel  MRN: [id number]  Admission Date: 2/17/2017  Attending Physician: Cara Ponce MD   Primary Care Provider: Carlos A Capuot MD         Patient information was obtained from patient, past medical records and ER records.     Subjective:     Principal Problem:Hypotension    Chief Complaint:   Chief Complaint   Patient presents with    Fatigue     Pt presents to ED via [facility] c/o generalized weakness and fatigue since 9am. Pt is on dialysis and was last dialyzed yesterday.         HPI: Ms. Medel is a 56 year old woman with ESRD on HD at [facility] every TTS who presented with generalized weakness and fatigue and very low BP associated with slurred speech.  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  She was fairly stable on fludrocortisone prescribed by her nephrologist and midodrine 15 mg prior to HD and 5 mg on non HD days.  She was recently hospitalized at [facility] for a declot of her AVF and had very low BP requiring stress dosing of hydrocortisone and CRRT.  In addition she was started on metoprolol 12.5 mg bid for chronic atrial fibrillation and her fludrocortisone was stopped as she was felt to have secondary adrenal insufficiency due to chronic steroid use.  She was started on Plavix after declot of the fistula.  She followed up with the post-hospitalization clinic at [facility] yesterday with multiple complaints and was offered hospitalization again but declined.      Workup in the ED on this hospitalization included labs that were at her baseline, lactic acid 2.8 and INR 2.  CXR showed stable interstitial edema.  She was given several boluses of IV fluids for BP 79/33 and eventually her BP came up to close to 100 systolic.  She was alert and oriented when I saw her in the ED.    Patient has a  biventricular pacemaker placed 17 in preparation for RFA of atrial fibrillation.      Past Medical History   Diagnosis Date    Anemia in ESRD (end-stage renal disease) 3/7/2016    Anticoagulant long-term use     Cervical radiculopathy 2015    Chronic combined systolic and diastolic heart failure 2013     EF 20% , improved with Medical therapy, negative PET     Chronic respiratory failure with hypoxia 2013     On home oxygen at 2-5 liters    Closed fracture of proximal end of right fibula 2016    Complications due to renal dialysis device, implant, and graft     DDD (degenerative disc disease), lumbar 2015    ESRD on hemodialysis 2016    Essential hypertension     Gout     Lateral meniscal tear 2016    Lumbar stenosis 2015    Paroxysmal atrial fibrillation 2014     Not on anticoagulation    Peripheral neuropathy 2013    Personal history of gastric ulcer 3/19/2013    Sarcoidosis, lung 2009     Diagnosed in  with ocular manifestation, on 4L home O2 and PO steroids    Secondary pulmonary hypertension 2015    Seizure disorder 3/19/2013    Shingles 2013    Thyroid disease        Past Surgical History   Procedure Laterality Date    Abdominal surgery      Vascular surgery       section       x2    Other surgical history       loop recorder implant       Review of patient's allergies indicates:   Allergen Reactions    Bactrim [sulfamethoxazole-trimethoprim] Other (See Comments)     Causes renal failure    Nsaids (non-steroidal anti-inflammatory drug) Other (See Comments)     Renal failure       No current facility-administered medications on file prior to encounter.      Current Outpatient Prescriptions on File Prior to Encounter   Medication Sig    ACZONE 5 % topical gel Apply to face every morning    ammonium lactate (LAC-HYDRIN) 12 % lotion Apply topically as needed (to legs).     atorvastatin (LIPITOR) 80  MG tablet Take 1 tablet (80 mg total) by mouth nightly.    clopidogrel (PLAVIX) 75 mg tablet Take 1 tablet (75 mg total) by mouth once daily.    econazole nitrate 1 % cream Apply topically 2 (two) times daily.     gabapentin (NEURONTIN) 300 MG capsule Take 1 capsule (300 mg total) by mouth 3 (three) times daily.    ketorolac 0.5% (ACULAR) 0.5 % Drop instill one drop into both eyes every morning    levetiracetam (KEPPRA) 500 MG Tab Take 1 tablet (500 mg total) by mouth 2 (two) times daily.    metoprolol tartrate (LOPRESSOR) 25 MG tablet Take 0.5 tablets (12.5 mg total) by mouth 2 (two) times daily.    midodrine (PROAMATINE) 5 MG Tab Take 3 tablets (15 mg total) by mouth every Tues, Thurs, Sat.    omeprazole (PRILOSEC) 20 MG capsule Take 1 capsule (20 mg total) by mouth once daily.    polyethylene glycol (GLYCOLAX) 17 gram/dose powder Take 17 g by mouth once daily. Dissolve in juice.    prednisoLONE acetate (PRED FORTE) 1 % DrpS INSTILL ONE DROP INTO  LEFT EYE THREE TIMES A DAY    predniSONE (DELTASONE) 10 MG tablet Takes one 10 mg tablet every morning (Patient taking differently: Takes one 10 mg tablet every morning with breakfast)    RENVELA 800 mg Tab TAKE 1 TABLET BY MOUTH THREE TIMES DAILY WITH MEALS    warfarin (COUMADIN) 5 MG tablet TAKE 1 TABLET BY MOUTH EVERYDAY EXCEPT 1 AND 1/2 TABLETS ON MONDAY AND FRIDAY OR AS DIRECTED BY CLINIC    diclofenac sodium 1 % Gel Apply 2 g topically every 8 (eight) hours as needed.    inulin-sorbitol 2 gram Chew Take 2 tablets by mouth 2 (two) times daily.    lactulose (CHRONULAC) 20 gram/30 mL Soln Take 15 mLs (10 g total) by mouth 3 (three) times daily as needed (for constipation).     Family History     Problem Relation (Age of Onset)    Coronary artery disease Father    Diabetes Mother, Father, Maternal Grandmother    Hypertension Mother, Father    Kidney failure Mother    Lupus Sister        Social History Main Topics    Smoking status: Former Smoker      Packs/day: 0.50     Years: 10.00     Types: Cigarettes     Quit date: 4/22/1994    Smokeless tobacco: Never Used    Alcohol use No    Drug use: No    Sexual activity: Not on file     Review of Systems   Constitutional: Negative for chills and fever.   HENT: Negative for congestion, rhinorrhea and sore throat.    Respiratory: Positive for shortness of breath. Negative for cough, chest tightness and wheezing.    Cardiovascular: Negative for chest pain and palpitations.   Gastrointestinal: Negative for abdominal pain, constipation, diarrhea and vomiting.   Endocrine: Negative for polydipsia and polyphagia.   Genitourinary:        Patient anuric.   Neurological: Positive for speech difficulty, weakness, light-headedness and headaches.   Psychiatric/Behavioral: Negative for confusion and dysphoric mood.     Objective:     Vital Signs (Most Recent):  Temp: 98.2 °F (36.8 °C) (02/17/17 1142)  Pulse: 84 (02/17/17 1809)  Resp: 20 (02/17/17 1809)  BP: (!) 100/58 (02/17/17 1803)  SpO2: 99 % (02/17/17 1809) Vital Signs (24h Range):  Temp:  [97.8 °F (36.6 °C)-98.2 °F (36.8 °C)] 98.2 °F (36.8 °C)  Pulse:  [72-84] 84  Resp:  [12-22] 20  SpO2:  [98 %-100 %] 99 %  BP: ()/(33-63) 100/58     Weight: 81.6 kg (180 lb)  Body mass index is 27.37 kg/(m^2).    Physical Exam   Constitutional: She is oriented to person, place, and time. She appears well-developed. No distress.   She obese.   HENT:   Head: Normocephalic and atraumatic.   Eyes: Conjunctivae are normal. Pupils are equal, round, and reactive to light.   Neck: Normal range of motion. Neck supple. No thyromegaly present.   Neck is thick and thyroid difficult to evaluate.   Cardiovascular:   Irregularly irregular rhythm with HR 76.  No murmur appreciated.   Pulmonary/Chest: Effort normal and breath sounds normal. She has no wheezes. She has no rales.   Abdominal: Soft. Bowel sounds are normal. She exhibits no distension. There is no tenderness.   Musculoskeletal: She  exhibits no edema or deformity.   LUE AVF with bruit, light thrill.   Neurological: She is alert and oriented to person, place, and time.   Skin: Skin is warm and dry.   Skin graft scars RUE.  Both lower extremities with healed wounds.     Psychiatric: She has a normal mood and affect. Her behavior is normal.        Significant Labs: All pertinent labs within the past 24 hours have been reviewed.    Significant Imaging: I have reviewed all pertinent imaging results/findings within the past 24 hours.    Assessment/Plan:     * Hypotension  - As above, patient bolused with IVF and stress steroids started.  - Continue midodrine as prescribed  - Resume fludrocortisone as it seems to have helped her (although endocrinology evaluation has been started and they feel she does not need it).  - Elevated lactic acid does not appear to be from infectious cause - check procalcitonin level.      Pulmonary sarcoidosis  - Stress dose hydrocortisone - 100 mg given IV in ED, then 50 mg every 8 hours for the next 24 hours, then resume usual dose of prednisone.  - Followed by Dr. Harvey as outpatient.      Chronic respiratory failure with hypoxia  - Patient on 4 liters oxygen NC at home.  - Sats 100% on 4 liters - continue.      ESRD on hemodialysis  - Dialyzes TTS at [facility].  - Chronically low BP associated with HD and possibly with adrenal insufficiency  - She is back to her normal low BP and may be dialyzed as usual tomorrow.  - [facility] consulted.      Chronic combined systolic and diastolic heart failure  - Patient anuric  - Appears well compensated - monitor fluid balance closely.      Long-term (current) use of anticoagulants  - Warfarin level therapeutic, continue current dose.      Current chronic use of systemic steroids        Weakness generalized  - Resolved with improvement in BP  - Continue to monitor.      VTE Risk Mitigation         Ordered     heparin (porcine) injection 5,000 Units  Every 8 hours      Route:  Subcutaneous        02/17/17 1351     Medium Risk of VTE  Once      02/17/17 1351     Place GUICHO hose  Until discontinued      02/17/17 1335     Place sequential compression device  Until discontinued      02/17/17 1335        Cara Haley MD  Department of Hospital Medicine   [facility]

## 2017-02-18 NOTE — CONSULTS
[facility] Nephrology Consult  H&P      Consult Requested By: Cara Ponce MD  Reason for Consult: ESRD    SUBJECTIVE:     History of Present Illness:  Patient is a 56 y.o. female presents with hypotension. ESRD on HD TTS at [facility] with Dr. Ware.  On chronic prednisone for sarcoidosis and has had issues with hypotension with HD.  On midodrine.  Fludrodocortisone recently stopped.  Also had declot of LUE AVG recently.      Today, she feels better.  Denies CP or SOB.  She is on home O2.     Review Of Systems:  Review of Systems - History obtained from chart review and the patient  General ROS: negative  ENT ROS: negative  Allergy and Immunology ROS: negative  Hematological and Lymphatic ROS: negative  Respiratory ROS: positive for - shortness of breath  Cardiovascular ROS: negative  Gastrointestinal ROS: no abdominal pain, change in bowel habits, or black or bloody stools  Genito-Urinary ROS: no dysuria, trouble voiding, or hematuria  Musculoskeletal ROS: negative  Neurological ROS: no TIA or stroke symptoms  Dermatological ROS: negative    Past Medical History   Diagnosis Date    Anemia in ESRD (end-stage renal disease) 3/7/2016    Anticoagulant long-term use     Cervical radiculopathy 7/20/2015    Chronic combined systolic and diastolic heart failure 6/14/2013     EF 20% 2013, improved with Medical therapy, negative PET 2013    Chronic respiratory failure with hypoxia 04/22/2013     On home oxygen at 2-5 liters    Closed fracture of proximal end of right fibula 6/27/2016    Complications due to renal dialysis device, implant, and graft     DDD (degenerative disc disease), lumbar 6/17/2015    ESRD on hemodialysis 2/23/2016    Essential hypertension     Gout     Lateral meniscal tear 5/31/2016    Lumbar stenosis 6/17/2015    Paroxysmal atrial fibrillation 5/16/2014     Not on anticoagulation    Peripheral neuropathy 11/13/2013    Personal history of gastric ulcer 3/19/2013    Sarcoidosis,  lung 2009     Diagnosed in  with ocular manifestation, on 4L home O2 and PO steroids    Secondary pulmonary hypertension 2015    Seizure disorder 3/19/2013    Shingles 2013    Thyroid disease      Past Surgical History   Procedure Laterality Date    Abdominal surgery      Vascular surgery       section       x2    Other surgical history       loop recorder implant     Family History   Problem Relation Age of Onset    Kidney failure Mother     Hypertension Mother     Diabetes Mother     Coronary artery disease Father     Hypertension Father     Diabetes Father     Lupus Sister     Diabetes Maternal Grandmother     Colon cancer Neg Hx     Ovarian cancer Neg Hx     Breast cancer Neg Hx     Melanoma Neg Hx      Social History   Substance Use Topics    Smoking status: Former Smoker     Packs/day: 0.50     Years: 10.00     Types: Cigarettes     Quit date: 1994    Smokeless tobacco: Never Used    Alcohol use No       Review of patient's allergies indicates:   Allergen Reactions    Bactrim [sulfamethoxazole-trimethoprim] Other (See Comments)     Causes renal failure    Nsaids (non-steroidal anti-inflammatory drug) Other (See Comments)     Renal failure        Medications:   sodium chloride 0.9%   Intravenous Once    atorvastatin  80 mg Oral Nightly    clopidogrel  75 mg Oral Daily    fludrocortisone  100 mcg Oral Daily    gabapentin  300 mg Oral TID    heparin (porcine)  5,000 Units Subcutaneous Q8H    hydrocortisone sodium succinate  50 mg Intravenous Q8H    levetiracetam  500 mg Oral BID    midodrine  15 mg Oral Every Tues, Th, Sat    [START ON 2017] midodrine  5 mg Oral Every Mon, Wed, Fri, Sun    pantoprazole  40 mg Oral Daily    prednisoLONE acetate  1 drop Left Eye TID    predniSONE  10 mg Oral Daily    sevelamer carbonate  800 mg Oral TID WM    warfarin  5 mg Oral Daily         OBJECTIVE:     Vital Signs (Most Recent)  Vitals:    17 1000  02/18/17 1030 02/18/17 1100 02/18/17 1115   BP: (!) 83/48 (!) 104/55 (!) 97/59    BP Location:       Patient Position:       BP Method:       Pulse: 100 108 98    Resp: 16 18 18    Temp:    97.8 °F (36.6 °C)   TempSrc:    Oral   SpO2: 95% (!) 77% 100%    Weight:       Height:               Date 02/18/17 0700 - 02/19/17 0659   Shift 5345-3562 7376-1602 2898-4720 24 Hour Total   I  N  T  A  K  E   P.O. 250   250    Shift Total  (mL/kg) 250  (2.3)   250  (2.3)   O  U  T  P  U  T   Shift Total  (mL/kg)       Weight (kg) 111.1 111.1 111.1 111.1           Physical Exam:  General appearance: well developed, well nourished, no distress, moderately obese  Head: normocephalic, atraumatic  Eyes:  conjunctivae/corneas clear. PERRL.  Neck: supple, symmetrical, trachea midline, no JVD  Lungs:  diminished breath sounds bilaterally  Chest wall: no tenderness  Heart: regular rate and rhythm, S1, S2 normal, no murmur, click, rub or gallop  Abdomen: soft, non-tender non-distented; bowel sounds normal; no masses,  no organomegaly  Extremities: no cyanosis or edema, or clubbing  Pulses: not examined  Skin: Skin color, texture, turgor normal. No rashes or lesions  Neurologic: Normal strength and tone. No focal numbness or weakness    Laboratory:    Recent Labs  Lab 02/17/17  1132 02/18/17  0434   WBC 12.78* 12.36   HGB 9.7* 8.8*   HCT 32.2* 29.0*   * 384*   MONO 9.2  1.2* 8.1  1.0       Recent Labs  Lab 02/17/17  1132 02/18/17  0434   * 132*   K 3.8 4.7   CL 92* 95   CO2 28 24   BUN 16 27*   CREATININE 6.0* 7.2*   CALCIUM 9.8 9.2       Diagnostic Results:  X-Ray:  Stable findings of interstitial edema and pulmonary vascular congestion.    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 ESRD: on HD TTS at [facility].  Last HD Thursday. Chronic hypotension  - will do 3 hours of HD today.    - attempt 2L UF as tolerated  - 3K, 2.5 Ca  - ,     2. Hypotension: would cont fludrocoritose and midorine  - discussed nocturnal HD with her, longer HD treatments.  HHD and PD.  Not interested in PD due to storage issues at her home.  Willing to do 5 hours of HD at her current facility.  Will consider HHD  - possibly due to adrenal insufficiency     3. Anemia: EPO with HD    4. Metabolic bone disease: cont binders    5. Nutrition: nephrocaps    6: Access: LUE AVG good thrill.  2 sutures in place.  Removed today.    Thank you for this consult.  Will follow with you.        Maxx Mercado MD

## 2017-02-18 NOTE — ASSESSMENT & PLAN NOTE
- Stress dose hydrocortisone - 100 mg given IV in ED, then 50 mg every 8 hours for the next 24 hours, then resume usual dose of prednisone.  - Followed by Dr. Harvey as outpatient.

## 2017-02-19 VITALS
SYSTOLIC BLOOD PRESSURE: 109 MMHG | DIASTOLIC BLOOD PRESSURE: 57 MMHG | HEART RATE: 104 BPM | TEMPERATURE: 98 F | OXYGEN SATURATION: 96 % | BODY MASS INDEX: 37.12 KG/M2 | RESPIRATION RATE: 23 BRPM | HEIGHT: 68 IN | WEIGHT: 244.94 LBS

## 2017-02-19 PROBLEM — I95.3 HEMODIALYSIS-ASSOCIATED HYPOTENSION: Status: ACTIVE | Noted: 2017-01-21

## 2017-02-19 PROBLEM — E27.49 SECONDARY ADRENAL INSUFFICIENCY: Status: ACTIVE | Noted: 2017-02-04

## 2017-02-19 LAB
ALBUMIN SERPL BCP-MCNC: 2.8 G/DL
ALP SERPL-CCNC: 87 U/L
ALT SERPL W/O P-5'-P-CCNC: 8 U/L
ANION GAP SERPL CALC-SCNC: 9 MMOL/L
AST SERPL-CCNC: 15 U/L
BASOPHILS # BLD AUTO: 0.01 K/UL
BASOPHILS NFR BLD: 0.1 %
BILIRUB SERPL-MCNC: 0.3 MG/DL
BUN SERPL-MCNC: 27 MG/DL
CALCIUM SERPL-MCNC: 9.3 MG/DL
CHLORIDE SERPL-SCNC: 97 MMOL/L
CO2 SERPL-SCNC: 29 MMOL/L
CREAT SERPL-MCNC: 6 MG/DL
DIFFERENTIAL METHOD: ABNORMAL
EOSINOPHIL # BLD AUTO: 0 K/UL
EOSINOPHIL NFR BLD: 0.1 %
ERYTHROCYTE [DISTWIDTH] IN BLOOD BY AUTOMATED COUNT: 15.3 %
EST. GFR  (AFRICAN AMERICAN): 8 ML/MIN/1.73 M^2
EST. GFR  (NON AFRICAN AMERICAN): 7 ML/MIN/1.73 M^2
GLUCOSE SERPL-MCNC: 144 MG/DL
HCT VFR BLD AUTO: 29.9 %
HGB BLD-MCNC: 9.2 G/DL
LYMPHOCYTES # BLD AUTO: 1.5 K/UL
LYMPHOCYTES NFR BLD: 9.9 %
MCH RBC QN AUTO: 30.6 PG
MCHC RBC AUTO-ENTMCNC: 30.8 %
MCV RBC AUTO: 99 FL
MONOCYTES # BLD AUTO: 1.3 K/UL
MONOCYTES NFR BLD: 8.3 %
NEUTROPHILS # BLD AUTO: 12.5 K/UL
NEUTROPHILS NFR BLD: 80.8 %
PLATELET # BLD AUTO: 401 K/UL
PMV BLD AUTO: 10.4 FL
POTASSIUM SERPL-SCNC: 4.1 MMOL/L
PROT SERPL-MCNC: 6.5 G/DL
RBC # BLD AUTO: 3.01 M/UL
SODIUM SERPL-SCNC: 135 MMOL/L
WBC # BLD AUTO: 15.42 K/UL

## 2017-02-19 PROCEDURE — 25000003 PHARM REV CODE 250

## 2017-02-19 PROCEDURE — 85025 COMPLETE CBC W/AUTO DIFF WBC: CPT

## 2017-02-19 PROCEDURE — 63600175 PHARM REV CODE 636 W HCPCS

## 2017-02-19 PROCEDURE — 80053 COMPREHEN METABOLIC PANEL: CPT

## 2017-02-19 PROCEDURE — 25000003 PHARM REV CODE 250: Performed by: HOSPITALIST

## 2017-02-19 PROCEDURE — 36415 COLL VENOUS BLD VENIPUNCTURE: CPT

## 2017-02-19 PROCEDURE — 25000003 PHARM REV CODE 250: Performed by: INTERNAL MEDICINE

## 2017-02-19 PROCEDURE — 99239 HOSP IP/OBS DSCHRG MGMT >30: CPT | Mod: ,,, | Performed by: HOSPITALIST

## 2017-02-19 RX ORDER — MIDODRINE HYDROCHLORIDE 5 MG/1
10 TABLET ORAL
Status: DISCONTINUED | OUTPATIENT
Start: 2017-02-19 | End: 2017-02-19 | Stop reason: HOSPADM

## 2017-02-19 RX ORDER — MIDODRINE HYDROCHLORIDE 5 MG/1
15 TABLET ORAL
Start: 2017-02-21 | End: 2017-08-04 | Stop reason: SDUPTHER

## 2017-02-19 RX ADMIN — PANTOPRAZOLE SODIUM 40 MG: 40 TABLET, DELAYED RELEASE ORAL at 08:02

## 2017-02-19 RX ADMIN — MIDODRINE HYDROCHLORIDE 10 MG: 5 TABLET ORAL at 08:02

## 2017-02-19 RX ADMIN — CLOPIDOGREL 75 MG: 75 TABLET, FILM COATED ORAL at 08:02

## 2017-02-19 RX ADMIN — FLUDROCORTISONE ACETATE 100 MCG: 0.1 TABLET ORAL at 08:02

## 2017-02-19 RX ADMIN — Medication 1 CAPSULE: at 08:02

## 2017-02-19 RX ADMIN — PREDNISOLONE ACETATE 1 DROP: 10 SUSPENSION/ DROPS OPHTHALMIC at 06:02

## 2017-02-19 RX ADMIN — SEVELAMER CARBONATE 800 MG: 800 TABLET, FILM COATED ORAL at 08:02

## 2017-02-19 RX ADMIN — LEVETIRACETAM 500 MG: 500 TABLET, FILM COATED ORAL at 08:02

## 2017-02-19 RX ADMIN — HEPARIN SODIUM 5000 UNITS: 5000 INJECTION, SOLUTION INTRAVENOUS; SUBCUTANEOUS at 06:02

## 2017-02-19 RX ADMIN — GABAPENTIN 300 MG: 300 CAPSULE ORAL at 06:02

## 2017-02-19 RX ADMIN — PREDNISONE 10 MG: 5 TABLET ORAL at 08:02

## 2017-02-19 NOTE — PROGRESS NOTES
D/c instructions discussed, questions answered, meds reviewed. IVs removed. Requested and given education on renal diet. Dressed self. Wheeled to friend's truck c 3L home O2. No signs of distress.

## 2017-02-19 NOTE — ASSESSMENT & PLAN NOTE
- Stress dosing of hydrocortisone completed - resume oral prednisone.  - Followed by Dr. Harvey as outpatient.

## 2017-02-19 NOTE — ASSESSMENT & PLAN NOTE
- Stress dosing of hydrocortisone completed and patient back on oral prednisone.  - Followed by Dr. Harvey as outpatient.

## 2017-02-19 NOTE — ASSESSMENT & PLAN NOTE
- Dialyzes TTS at [facility].  - Chronically low BP associated with HD and possibly with adrenal insufficiency due to chronic steroid use.  - She is back to her normal BP and tolerated HD.  - Follow up with Dr. Ware

## 2017-02-19 NOTE — PROGRESS NOTES
[facility] Hospital Medicine  Progress Note    Patient Name: Sisi Medel  MRN: [id number]  Patient Class: IP- Inpatient   Admission Date: 2/17/2017  Length of Stay: 1 days  Attending Physician: Cara Ponce MD  Primary Care Provider: Carlos A Caputo MD        Subjective:     Principal Problem:Hypotension    HPI:  Ms. Medel is a 56 year old woman with ESRD on HD at [facility] every TTS who presented with generalized weakness and fatigue and very low BP associated with slurred speech.  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  She was fairly stable on fludrocortisone prescribed by her nephrologist and midodrine 15 mg prior to HD and 5 mg on non HD days.  She was recently hospitalized at [facility] for a declot of her AVF and had very low BP requiring stress dosing of hydrocortisone and CRRT.  In addition she was started on metoprolol 12.5 mg bid for chronic atrial fibrillation and her fludrocortisone was stopped as she was felt to have secondary adrenal insufficiency due to chronic steroid use.  She was started on Plavix after declot of the fistula.  She followed up with the post-hospitalization clinic at [facility] yesterday with multiple complaints and was offered hospitalization again but declined.      Workup in the ED on this hospitalization included labs that were at her baseline, lactic acid 2.8 and INR 2.  CXR showed stable interstitial edema.  She was given several boluses of IV fluids for BP 79/33 and eventually her BP came up to close to 100 systolic.  She was alert and oriented when I saw her in the ED.    Patient has a biventricular pacemaker placed 1/11/17 in preparation for RFA of atrial fibrillation.      Hospital Course:  Patient was admitted and her fludrocortisone was continued along with the midodrine and stress dosing of hydrocortisone.  She underwent HD on 2/18  without any further issues in her blood pressure.    Interval History:  Feels well and BP stable after HD, but is worried about getting dizzy when she stands up.    Review of Systems   Constitutional: Negative for chills and fever.   Respiratory: Negative for cough and shortness of breath.    Neurological: Negative for dizziness and headaches.     Objective:     Vital Signs (Most Recent):  Temp: 97.6 °F (36.4 °C) (02/18/17 1900)  Pulse: 86 (02/18/17 1901)  Resp: (!) 23 (02/18/17 1900)  BP: (!) 121/57 (02/18/17 1900)  SpO2: (!) 77 % (02/18/17 1901) Vital Signs (24h Range):  Temp:  [97.6 °F (36.4 °C)-98.5 °F (36.9 °C)] 97.6 °F (36.4 °C)  Pulse:  [] 86  Resp:  [11-27] 23  SpO2:  [77 %-100 %] 77 %  BP: ()/(48-75) 121/57     Weight: 111.1 kg (244 lb 14.9 oz)  Body mass index is 37.24 kg/(m^2).    Intake/Output Summary (Last 24 hours) at 02/18/17 2056  Last data filed at 02/18/17 1800   Gross per 24 hour   Intake          1289.33 ml   Output             2498 ml   Net         -1208.67 ml      Physical Exam   Constitutional: She is oriented to person, place, and time. She appears well-developed. No distress.   She is obese.   HENT:   Head: Normocephalic and atraumatic.   Eyes: Conjunctivae are normal. Pupils are equal, round, and reactive to light.   Neck: Normal range of motion. Neck supple. No thyromegaly present.   Neck is thick and thyroid difficult to evaluate.   Cardiovascular:   Irregularly irregular rhythm with HR 76.  No murmur appreciated.   Pulmonary/Chest: Effort normal and breath sounds normal. She has no wheezes. She has no rales.   Abdominal: Soft. Bowel sounds are normal. She exhibits no distension. There is no tenderness.   Musculoskeletal: She exhibits no edema or deformity.   LUE AVF with bruit, light thrill.   Neurological: She is alert and oriented to person, place, and time.   Skin: Skin is warm and dry.   Skin graft scars RUE.  Both lower extremities with healed wounds.     Psychiatric:  She has a normal mood and affect. Her behavior is normal.       Significant Labs: All pertinent labs within the past 24 hours have been reviewed.    Significant Imaging: I have reviewed all pertinent imaging results/findings within the past 24 hours.    Assessment/Plan:      * Hypotension  - As above, patient bolused with IVF in ED and stress steroids started.  - Increase midodrine to 10 mg on non HD days and 15 mg on HD days.  - Increase fludrocortisone to 0.1 mg bid as it seems to have helped her (although endocrinology evaluation has been started and they feel she does not need it).  - Elevated lactic acid does not appear to be from infectious cause.      Pulmonary sarcoidosis  - Stress dosing of hydrocortisone completed - resume oral prednisone.  - Followed by Dr. Harvey as outpatient.      Chronic respiratory failure with hypoxia  - Patient on 4 liters oxygen NC at home.  - Sats 100% on 4 liters - continue.      ESRD on hemodialysis  - Dialyzes TTS at [facility].  - Chronically low BP associated with HD and possibly with adrenal insufficiency  - She is back to her normal BP and tolerated HD today  - [facility] following.      Chronic combined systolic and diastolic heart failure  - Patient anuric  - Appears well compensated - monitor fluid balance closely.      Long-term (current) use of anticoagulants  - Warfarin level therapeutic, continue current dose.      Current chronic use of systemic steroids        Weakness generalized  - Resolved with improvement in BP  - Continue to monitor.      VTE Risk Mitigation         Ordered     heparin (porcine) injection 5,000 Units  Every 8 hours     Route:  Subcutaneous        02/17/17 1351     Medium Risk of VTE  Once      02/17/17 1351     Place GUICHO hose  Until discontinued      02/17/17 1335     Place sequential compression device  Until discontinued      02/17/17 1335          Cara Haley MD  Department of Hospital Medicine   [facility]

## 2017-02-19 NOTE — DISCHARGE SUMMARY
[facility] Hospital Medicine  Discharge Summary      Patient Name: Sisi Medel  MRN: [id number]  Admission Date: 2/17/2017  Hospital Length of Stay: 2 days  Discharge Date and Time:  02/19/2017 8:17 AM  Attending Physician: Cara Ponce MD   Discharging Provider: Cara Ponce MD  Primary Care Provider: Carlos A Caputo MD      HPI:   Ms. Medel is a 56 year old woman with ESRD on HD at [facility] every TTS who presented with generalized weakness and fatigue and very low BP associated with slurred speech.  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  She was fairly stable on fludrocortisone prescribed by her nephrologist and midodrine 15 mg prior to HD and 5 mg on non HD days.  She was recently hospitalized at [facility] for a declot of her AVF and had very low BP requiring stress dosing of hydrocortisone and CRRT.  In addition she was started on metoprolol 12.5 mg bid for chronic atrial fibrillation and her fludrocortisone was stopped as she was felt to have secondary adrenal insufficiency due to chronic steroid use and fludrocortisone is generally not helpful for this indication.  She was started on Plavix after declot of the fistula.  She followed up with the post-hospitalization clinic at [facility] on the day prior to this admission with multiple complaints and was offered hospitalization again but declined.      Workup in the ED on this hospitalization included labs that were at her baseline, lactic acid 2.8 and INR 2.  CXR showed stable interstitial edema.  She was given several boluses of IV fluids for BP 79/33 and eventually her BP came up to close to 100 systolic.  She was alert and oriented when I saw her in the ED.    Patient has a biventricular pacemaker placed 1/11/17 in preparation for RFA of atrial fibrillation.    Indwelling Lines/Drains at time of discharge:    Lines/Drains/Airways     Central Venous Catheter Line                 Hemodialysis Catheter 02/04/17 1315 right internal jugular 14 days          Drain                 Hemodialysis AV Graft Left upper arm -- days         Hemodialysis AV Graft Left upper arm -- days         Hemodialysis AV Fistula 11/27/16 0920 Left upper arm 83 days              Hospital Course:   Patient was admitted and her fludrocortisone was resumed along with the midodrine and stress dosing of hydrocortisone.  She underwent HD on 2/18 without any further issues in her blood pressure.  Hydrocortisone was discontinued and her midodrine dosing was changed to 10 mg po on non HD days and 15 mg po on HD days.  After discussing the matter with the nephrology service, we decided to increase the fludrocortisone dose to 0.1 mg bid.  Although her labs are not consistent with primary adrenal insufficiency her blood pressure does improve with the medication, and the decrease in her blood pressure coincided with discontinuation of it.      Hydrocortisone was discontinued the day prior to discharge and her BP was well supported around 110 systolic.  She was able to walk without feeling dizzy and felt comfortable being discharged home.       Consults:   Consults         Status Ordering Provider     Inpatient consult to [facility]  Once     Provider:  Aaron Ware MD    Completed JERSEY CARL          Final Active Diagnoses:    Diagnosis Date Noted POA    PRINCIPAL PROBLEM:  Hemodialysis-associated hypotension [I95.3] 01/21/2017 Yes    Weakness generalized [R53.1] 02/17/2017 Yes    Secondary adrenal insufficiency [E27.49] 02/04/2017 Yes    Current chronic use of systemic steroids [Z79.52] 11/18/2016 Not Applicable     Chronic    Long-term (current) use of anticoagulants [Z79.01] 07/05/2016 Not Applicable    ESRD on hemodialysis [N18.6, Z99.2] 02/23/2016 Not Applicable     Chronic    Chronic combined systolic and diastolic heart failure  [I50.42] 06/14/2013 Yes     Chronic    Chronic respiratory failure with hypoxia [J96.11] 04/22/2013 Yes     Chronic    Pulmonary sarcoidosis [D86.0] 03/19/2013 Yes     Chronic      Problems Resolved During this Admission:    Diagnosis Date Noted Date Resolved POA      * Hemodialysis-associated hypotension  - As above, patient bolused with IVF in ED and stress steroids given for 24 hours.  - Increase midodrine to 10 mg on non HD days and 15 mg on HD days.  - Increase fludrocortisone to 0.1 mg bid as it seems to have helped her (although noted endocrinology evaluation has been started and they feel she does not need it).  - Elevated lactic acid does not appear to be from infectious cause.      Pulmonary sarcoidosis  - Stress dosing of hydrocortisone completed and patient back on oral prednisone.  - Followed by Dr. Harvey as outpatient.      Chronic respiratory failure with hypoxia  - Patient on 4 liters oxygen NC at home.  - Sats 100% on 4 liters - continue.      ESRD on hemodialysis  - Dialyzes TTS at [facility].  - Chronically low BP associated with HD and possibly with adrenal insufficiency due to chronic steroid use.  - She is back to her normal BP and tolerated HD.  - Follow up with Dr. Ware      Chronic combined systolic and diastolic heart failure  - Patient anuric  - Appears well compensated.      Long-term (current) use of anticoagulants  - Warfarin level therapeutic, continue current dose.      Current chronic use of systemic steroids        Secondary adrenal insufficiency        Weakness generalized  - Resolved with improvement in BP          Discharged Condition: stable    Disposition: Home or Self Care    Follow Up:  Follow-up Information     Follow up with Carlos A Caputo MD.    Specialty:  Internal Medicine    Why:  Follow up in 1-2 weeks    Contact information:    [address] [facility] [address] [phone]          Follow up with Aaron Ware MD.    Specialty:  Nephrology     Why:  Follow up for the next available appointment    Contact information:    [id number]  [facility] [address]  [phone]          Patient Instructions:     Diet general   Order Specific Question Answer Comments   Additional restrictions: Renal      Activity as tolerated       Medications:  Reconciled Home Medications:   Current Discharge Medication List      START taking these medications    Details   fludrocortisone (FLORINEF) 0.1 mg Tab Take 1 tablet (100 mcg total) by mouth 2 (two) times daily.  Qty: 60 tablet, Refills: 11         CONTINUE these medications which have CHANGED    Details   !! midodrine (PROAMATINE) 5 MG Tab Take 2 tablets (10 mg total) by mouth every Mon, Wed, Fri, Sun.  Qty: 32 tablet, Refills: 11      !! midodrine (PROAMATINE) 5 MG Tab Take 3 tablets (15 mg total) by mouth every Tues, Thurs, Sat. Take 2 tablets (10 mg total) by mouth on non dialysis days.       !! - Potential duplicate medications found. Please discuss with provider.      CONTINUE these medications which have NOT CHANGED    Details   ACZONE 5 % topical gel Apply to face every morning  Refills: 2      ammonium lactate (LAC-HYDRIN) 12 % lotion Apply topically as needed (to legs).   Refills: 2      atorvastatin (LIPITOR) 80 MG tablet Take 1 tablet (80 mg total) by mouth nightly.  Qty: 90 tablet, Refills: 1    Associated Diagnoses: Chronic combined systolic and diastolic heart failure      cholecalciferol, vitamin D3, 10,000 unit Tab Take 1 tablet by mouth once daily.      clopidogrel (PLAVIX) 75 mg tablet Take 1 tablet (75 mg total) by mouth once daily.  Qty: 30 tablet, Refills: 11      docusate sodium (COLACE) 100 MG capsule Take 100 mg by mouth 2 (two) times daily as needed for Constipation.      econazole nitrate 1 % cream Apply topically 2 (two) times daily.       gabapentin (NEURONTIN) 300 MG capsule Take 1 capsule (300 mg total) by mouth 3 (three) times daily.  Qty: 270 capsule, Refills: 0    Comments:  **Patient requests 90 days supply**      ketorolac 0.5% (ACULAR) 0.5 % Drop instill one drop into both eyes every morning  Refills: 3      levetiracetam (KEPPRA) 500 MG Tab Take 1 tablet (500 mg total) by mouth 2 (two) times daily.  Qty: 60 tablet, Refills: 5    Associated Diagnoses: Seizure disorder      metoprolol tartrate (LOPRESSOR) 25 MG tablet Take 0.5 tablets (12.5 mg total) by mouth 2 (two) times daily.  Qty: 60 tablet, Refills: 11      omeprazole (PRILOSEC) 20 MG capsule Take 1 capsule (20 mg total) by mouth once daily.  Qty: 30 capsule, Refills: 5    Associated Diagnoses: Gastroesophageal reflux disease with esophagitis      oxycodone-acetaminophen (PERCOCET) 7.5-325 mg per tablet Take 1 tablet by mouth every 4 (four) hours as needed for Pain.      polyethylene glycol (GLYCOLAX) 17 gram/dose powder Take 17 g by mouth once daily. Dissolve in juice.  Qty: 510 g, Refills: 0      prednisoLONE acetate (PRED FORTE) 1 % DrpS INSTILL ONE DROP INTO  LEFT EYE THREE TIMES A DAY  Refills: 3      predniSONE (DELTASONE) 10 MG tablet Takes one 10 mg tablet every morning  Qty: 60 tablet, Refills: 3    Associated Diagnoses: Sarcoidosis of lung      RENVELA 800 mg Tab TAKE 1 TABLET BY MOUTH THREE TIMES DAILY WITH MEALS  Qty: 90 tablet, Refills: 0      tizanidine 4 mg Cap Take 1 capsule by mouth 2 (two) times daily as needed (for muscle spasms).      warfarin (COUMADIN) 5 MG tablet TAKE 1 TABLET BY MOUTH EVERYDAY EXCEPT 1 AND 1/2 TABLETS ON MONDAY AND FRIDAY OR AS DIRECTED BY CLINIC  Qty: 189 tablet, Refills: 0    Comments: **Patient requests 90 days supply**  Associated Diagnoses: Long-term (current) use of anticoagulants; Paroxysmal atrial fibrillation      diclofenac sodium 1 % Gel Apply 2 g topically every 8 (eight) hours as needed.  Qty: 100 g, Refills: 0      inulin-sorbitol 2 gram Chew Take 2 tablets by mouth 2 (two) times daily.  Refills: 0      lactulose (CHRONULAC) 20 gram/30 mL Soln Take 15 mLs (10 g total) by  mouth 3 (three) times daily as needed (for constipation).  Qty: 450 mL, Refills: 1    Associated Diagnoses: Constipation, unspecified constipation type           Time spent on the discharge of patient: >30 minutes    Cara Haley MD  Department of Hospital Medicine  [facility]

## 2017-02-19 NOTE — ASSESSMENT & PLAN NOTE
- As above, patient bolused with IVF in ED and stress steroids given for 24 hours.  - Increase midodrine to 10 mg on non HD days and 15 mg on HD days.  - Increase fludrocortisone to 0.1 mg bid as it seems to have helped her (although noted endocrinology evaluation has been started and they feel she does not need it).  - Elevated lactic acid does not appear to be from infectious cause.

## 2017-02-19 NOTE — SUBJECTIVE & OBJECTIVE
Interval History:  Feels well and BP stable after HD, but is worried about getting dizzy when she stands up.    Review of Systems   Constitutional: Negative for chills and fever.   Respiratory: Negative for cough and shortness of breath.    Neurological: Negative for dizziness and headaches.     Objective:     Vital Signs (Most Recent):  Temp: 97.6 °F (36.4 °C) (02/18/17 1900)  Pulse: 86 (02/18/17 1901)  Resp: (!) 23 (02/18/17 1900)  BP: (!) 121/57 (02/18/17 1900)  SpO2: (!) 77 % (02/18/17 1901) Vital Signs (24h Range):  Temp:  [97.6 °F (36.4 °C)-98.5 °F (36.9 °C)] 97.6 °F (36.4 °C)  Pulse:  [] 86  Resp:  [11-27] 23  SpO2:  [77 %-100 %] 77 %  BP: ()/(48-75) 121/57     Weight: 111.1 kg (244 lb 14.9 oz)  Body mass index is 37.24 kg/(m^2).    Intake/Output Summary (Last 24 hours) at 02/18/17 2056  Last data filed at 02/18/17 1800   Gross per 24 hour   Intake          1289.33 ml   Output             2498 ml   Net         -1208.67 ml      Physical Exam   Constitutional: She is oriented to person, place, and time. She appears well-developed. No distress.   She is obese.   HENT:   Head: Normocephalic and atraumatic.   Eyes: Conjunctivae are normal. Pupils are equal, round, and reactive to light.   Neck: Normal range of motion. Neck supple. No thyromegaly present.   Neck is thick and thyroid difficult to evaluate.   Cardiovascular:   Irregularly irregular rhythm with HR 76.  No murmur appreciated.   Pulmonary/Chest: Effort normal and breath sounds normal. She has no wheezes. She has no rales.   Abdominal: Soft. Bowel sounds are normal. She exhibits no distension. There is no tenderness.   Musculoskeletal: She exhibits no edema or deformity.   LUE AVF with bruit, light thrill.   Neurological: She is alert and oriented to person, place, and time.   Skin: Skin is warm and dry.   Skin graft scars RUE.  Both lower extremities with healed wounds.     Psychiatric: She has a normal mood and affect. Her behavior is  normal.       Significant Labs: All pertinent labs within the past 24 hours have been reviewed.    Significant Imaging: I have reviewed all pertinent imaging results/findings within the past 24 hours.

## 2017-02-19 NOTE — PROGRESS NOTES
Hypotensive throughout shift; MAP maintained above 65. Dialysis removed 2L. A. Fib c frequent PVCs during dialysis. Room air, oriented, turns self. SOB when ambulated short distance to bed side commode. Plans for d/c in AM.

## 2017-02-19 NOTE — ASSESSMENT & PLAN NOTE
- Dialyzes TTS at [facility].  - Chronically low BP associated with HD and possibly with adrenal insufficiency  - She is back to her normal BP and tolerated HD today  - [facility] following.

## 2017-02-19 NOTE — ASSESSMENT & PLAN NOTE
- As above, patient bolused with IVF in ED and stress steroids started.  - Increase midodrine to 10 mg on non HD days and 15 mg on HD days.  - Increase fludrocortisone to 0.1 mg bid as it seems to have helped her (although endocrinology evaluation has been started and they feel she does not need it).  - Elevated lactic acid does not appear to be from infectious cause.

## 2017-02-20 ENCOUNTER — HOSPITAL ENCOUNTER (OUTPATIENT)
Dept: VASCULAR SURGERY | Facility: CLINIC | Age: 57
Discharge: HOME | End: 2017-02-20
Attending: SURGERY
Payer: MEDICARE

## 2017-02-20 ENCOUNTER — ANTI-COAG VISIT (OUTPATIENT)
Dept: CARDIOLOGY | Facility: CLINIC | Age: 57
End: 2017-02-20

## 2017-02-20 ENCOUNTER — LAB VISIT (OUTPATIENT)
Dept: LAB | Facility: HOSPITAL | Age: 57
End: 2017-02-20
Payer: MEDICARE

## 2017-02-20 ENCOUNTER — OFFICE VISIT (OUTPATIENT)
Dept: VASCULAR SURGERY | Facility: CLINIC | Age: 57
End: 2017-02-20
Attending: SURGERY
Payer: MEDICARE

## 2017-02-20 VITALS
WEIGHT: 266 LBS | DIASTOLIC BLOOD PRESSURE: 74 MMHG | HEIGHT: 68 IN | HEART RATE: 94 BPM | TEMPERATURE: 96 F | SYSTOLIC BLOOD PRESSURE: 117 MMHG | BODY MASS INDEX: 40.32 KG/M2

## 2017-02-20 DIAGNOSIS — T82.868D AV GRAFT THROMBOSIS, SUBSEQUENT ENCOUNTER: ICD-10-CM

## 2017-02-20 DIAGNOSIS — N18.6 ESRD ON HEMODIALYSIS: Primary | Chronic | ICD-10-CM

## 2017-02-20 DIAGNOSIS — Z99.2 ESRD ON HEMODIALYSIS: Primary | Chronic | ICD-10-CM

## 2017-02-20 DIAGNOSIS — Z79.01 LONG-TERM (CURRENT) USE OF ANTICOAGULANTS: ICD-10-CM

## 2017-02-20 DIAGNOSIS — N18.6 ESRD ON HEMODIALYSIS: Chronic | ICD-10-CM

## 2017-02-20 DIAGNOSIS — I48.0 PAROXYSMAL ATRIAL FIBRILLATION: ICD-10-CM

## 2017-02-20 DIAGNOSIS — Z99.2 ESRD ON HEMODIALYSIS: Chronic | ICD-10-CM

## 2017-02-20 LAB
INR PPP: 2.5
PROTHROMBIN TIME: 24.7 SEC

## 2017-02-20 PROCEDURE — 85610 PROTHROMBIN TIME: CPT

## 2017-02-20 PROCEDURE — 36415 COLL VENOUS BLD VENIPUNCTURE: CPT

## 2017-02-20 PROCEDURE — 99214 OFFICE O/P EST MOD 30 MIN: CPT | Mod: S$GLB,,, | Performed by: SURGERY

## 2017-02-20 PROCEDURE — 99499 UNLISTED E&M SERVICE: CPT | Mod: S$GLB,,, | Performed by: SURGERY

## 2017-02-20 PROCEDURE — 99999 PR PBB SHADOW E&M-EST. PATIENT-LVL III: CPT | Mod: PBBFAC,,, | Performed by: SURGERY

## 2017-02-20 PROCEDURE — 93990 DOPPLER FLOW TESTING: CPT | Mod: S$GLB,,, | Performed by: SURGERY

## 2017-02-20 NOTE — LETTER
February 20, 2017      Olivia Morrison MD  [address]  [facility] [address]           [facility]  [address]  [facility] [address]  Phone: [phone]  Fax: [phone]          Patient: Sisi Medel   MR Number: [id number]   YOB: 1960   Date of Visit: 2/20/2017       Dear Dr. Olivia Morrison:    Thank you for referring Sisi Medel to me for evaluation. Attached you will find relevant portions of my assessment and plan of care.    If you have questions, please do not hesitate to call me. I look forward to following Sisi Medel along with you.    Sincerely,    Torrey Villafana MD    Enclosure  CC:  No Recipients    If you would like to receive this communication electronically, please contact [email or web address] or [phone] to request more information on [employer] access.    For providers and/or their staff who would like to refer a patient to [facility], please contact us through our one-stop-shop provider referral line, [facility], at [phone].    If you feel you have received this communication in error or would no longer like to receive these types of communications, please e-mail [email or web address]

## 2017-02-20 NOTE — PROGRESS NOTES
Sisi Medel  10/07/2016    HPI:  Patient is a 55 y.o.  female with a h/o HTN, HLD, CHF (EF 20%), COPD on Home O2, Paroxysmal Afib (On Coumadin anticoagulation), Seizure disorder, ESRD on HD via LUE AVG who is here for evaluation of her LUE upper arm AV graft (placed by Dr. Villafana 2/3/16) following declot of graft on 2/7/17. She was recentltly admitted for symptomatic hypotensio discharged from the hospital on 2/19/17, n.  She has been doing well since, using her LUE AVG without issue. No problems with accessing, with flow or with deaccessing.     S/p   2/3/16: LUE AVG creation (Dr Villafana)  6/21/16: Percutaneous mechanical thrombectomy w [facility] Angiojet AVX; 4fr OTW embolectomy of arterial inflow   PTA outflow stenosis with a 7x40 mm balloon  10/12/16: fistulogram (75% stenosis noted), left upper extremity AV graft PTA venous outflow with resolution of stenosis noted    Findings/Key Components:   Successful treatment venous outflow stenosis  Good palpable thrill     No  Prior  MI/stroke  Tobacco use: Former smoker     Past Medical History   Diagnosis Date    Anemia in ESRD (end-stage renal disease) 3/7/2016    Cervical radiculopathy 7/20/2015    Chronic combined systolic and diastolic heart failure 6/14/2013     EF 20% 2013, improved with Medical therapy, negative PET 2013    Chronic respiratory failure with hypoxia 04/22/2013     On home oxygen at 2-5 liters    Chronic rhinitis 10/2/2013    DDD (degenerative disc disease), lumbar 6/17/2015    ESRD on hemodialysis 2/23/2016    Essential hypertension     Gout     Hyperlipidemia 3/7/2016    Lateral meniscal tear 5/31/2016    Lumbar stenosis 6/17/2015    Morbid obesity 8/15/2013    Paroxysmal atrial fibrillation 5/16/2014     Not on anticoagulation    Peripheral neuropathy 11/13/2013    Personal history of fall 7/14/2014    Personal history of gastric ulcer 3/19/2013    Sarcoidosis, lung 2009     Diagnosed in 2009 with  ocular manifestation, on 4L home O2 and PO steroids    Secondary pulmonary hypertension 2015    Seizure disorder 3/19/2013    Shingles 2013    Spondylarthrosis 2015     Past Surgical History   Procedure Laterality Date     section, classic      Abdominal surgery      Vascular surgery       Family History   Problem Relation Age of Onset    Kidney failure Mother     Hypertension Mother     Diabetes Mother     Coronary artery disease Father     Hypertension Father     Diabetes Father     Lupus Sister     Diabetes Maternal Grandmother     Asthma Neg Hx     Emphysema Neg Hx     Melanoma Neg Hx     Psoriasis Neg Hx      Social History     Social History    Marital status: Single     Spouse name: N/A    Number of children: N/A    Years of education: N/A     Occupational History    Not on file.     Social History Main Topics    Smoking status: Former Smoker     Packs/day: 0.50     Years: 10.00     Types: Cigarettes     Quit date: 1994    Smokeless tobacco: Never Used    Alcohol use No    Drug use: No    Sexual activity: No     Other Topics Concern    Not on file     Social History Narrative    Lives with boyfriend/partner who helps with her care.     Plans to travel to [place] for 2 weeks in 2016     Current Outpatient Prescriptions on File Prior to Visit   Medication Sig    ACZONE 5 % topical gel Apply topically once daily.     allopurinol (ZYLOPRIM) 100 MG tablet Take 1 tablet (100 mg total) by mouth once daily.    amiodarone (PACERONE) 200 MG Tab Take 1 tablet (200 mg total) by mouth every morning.    ammonium lactate (LAC-HYDRIN) 12 % lotion Apply topically as needed (for scars on arms).     aspirin 81 MG Chew Take 81 mg by mouth every morning.    atorvastatin (LIPITOR) 80 MG tablet Take 80 mg by mouth once daily.     capsicum 0.075% (ZOSTRIX) 0.075 % topical cream Apply topically 3 (three) times daily. For neuropathic pain of feet    gabapentin (NEURONTIN)  100 MG capsule TAKE ONE CAPSULE BY MOUTH THREE TIMES DAILY (Patient taking differently: TAKE ONE CAPSULE BY MOUTH THREE TIMES DAILY-noon, evening, bedtime)    levetiracetam (KEPPRA) 500 MG Tab TAKE 1 TABLET BY MOUTH TWICE DAILY.    melatonin 5 mg Tab Take 1 tablet by mouth nightly.    midodrine (PROAMATINE) 10 MG tablet     midodrine (PROAMATINE) 5 MG Tab     NEPHRO-LINDA 0.8 mg Tab TK 1 T PO  QD    ondansetron (ZOFRAN-ODT) 8 MG TbDL Take 1 tablet (8 mg total) by mouth every 8 (eight) hours as needed.    oxycodone-acetaminophen (PERCOCET) 7.5-325 mg per tablet Take 1 tablet by mouth every 4 (four) hours as needed.    predniSONE (DELTASONE) 10 MG tablet TAKE 3 TABLETS BY MOUTH FOR 7 DAYS, THEN 2 TABLETS FOR 7 DAYS, THEN 1 TABLET EVERY DAY AFTER.    RENVELA 800 mg Tab TAKE 1 TABLET BY MOUTH THREE TIMES DAILY WITH MEALS    tizanidine 4 mg Cap Take 4 mg by mouth 2 (two) times daily as needed. (Patient taking differently: Take 4 mg by mouth daily as needed (for muscle spasms.). )    warfarin (COUMADIN) 5 MG tablet TAKE 1 TABLET(5 MG) BY MOUTH DAILY     No current facility-administered medications on file prior to visit.        REVIEW OF SYSTEMS:  General: negative; ENT: negative; Allergy and Immunology: negative; Hematological and Lymphatic: Negative; Endocrine: negative; Respiratory: no cough, shortness of breath, or wheezing; Cardiovascular: no chest pain or dyspnea on exertion; Gastrointestinal: no abdominal pain/back, change in bowel habits, or bloody stools; Genito-Urinary: no dysuria, trouble voiding, or hematuria; Musculoskeletal: no pain, numbness, to LUE  Neurological: no TIA or stroke symptoms; Psychiatric: no nervousness, anxiety or depression.    PHYSICAL EXAM:   Vitals:    07/14/16 1532   BP: (!) 81/57   Pulse: (!) 52   Temp: 98.9 °F (37.2 °C)       General appearance:  Alert, well-appearing, and in no distress.  Oriented to person, place, and time   Neurological: Normal speech, no focal findings  "noted; CN II - XII grossly intact           Musculoskeletal: Digits/nail without cyanosis/clubbing.  Normal muscle strength/tone.                 Neck: Supple, no significant adenopathy; thyroid is not enlarged                Chest:  Clear to auscultation, symmetric air entry     No use of accessory muscles             Cardiac: Normal rate and regular rhythm, S1 and S2 normal; PMI non-displaced          Abdomen: Soft, nontender, nondistended, no masses or organomegaly     no rebound tenderness noted      Extremities:   LUE AVG with palpable thrill, no increased pulsatility, 2+ distal radial pulse; LUE slightly swollen compared to right which patient reports is "always like that".       LAB RESULTS:  Lab Results   Component Value Date    K 4.5 07/12/2016    K 4.6 07/04/2016    K 5.1 07/03/2016    CREATININE 6.5 (H) 07/12/2016    CREATININE 10.2 (H) 07/04/2016    CREATININE 7.6 (H) 07/03/2016     Lab Results   Component Value Date    WBC 11.09 07/12/2016    WBC 10.38 07/04/2016    WBC 11.01 07/03/2016    HCT 37.5 07/12/2016    HCT 31.4 (L) 07/04/2016    HCT 30.1 (L) 07/03/2016     07/12/2016     07/04/2016     07/03/2016     Lab Results   Component Value Date    HGBA1C 5.7 05/17/2016    HGBA1C 6.7 (H) 10/15/2015    HGBA1C 5.5 06/15/2015     IMAGING:    US Hemodialysis: 2/20/17               Velocities: in cm./sec  Inflow- 150  arterial anastomosis- 317  proximal graft- 315  mid graft- 275  distal graft- 279  venous anastomosis- 209  venous outflow-  127                 volume flow: in ml.min.  1696    Overall Impression:  No evidence of focal hemodynamically significant stenosis      IMP/PLAN:  55 y.o. female with HTN, HLD, CHF (EF 20%), COPD on Home O2, Paroxysmal Afib (On Coumadin anticoagulation), Seizure disorder, ESRD on HD via LUE AVG who is here today for evaluation of her LUE upper arm AV graft (placed by Dr. Villafana 2/3/16) doing well with no complaints.     - patient doing well with " HD  - f/u in 3 months with U/S LUE hemodialysis graft duplex     COSME Dotson, APRN, FNP-BC  Nurse Practitioner  Vascular and Endovascular Surgery          STAFF ADDENDUM    I have reviewed the relevant data and the resident's assessment and agree with the findings and plan as outlined above.  Ms Medel is seen in follow up today following a percutaneous declot of her LUE AV graft.  She has done well since and is dialyzing normally.  Her LUE AVG duplex today shows no stenoses and robust flow volume (~1700cc/min).     1) continue with HD as planned  2) RTC in 3 months with surveillance duplex or sooner if problems arise    Torrey Villafana MD  Vascular & Endovascular Surgery

## 2017-02-21 ENCOUNTER — PATIENT OUTREACH (OUTPATIENT)
Dept: ADMINISTRATIVE | Facility: CLINIC | Age: 57
End: 2017-02-21
Payer: MEDICARE

## 2017-02-21 NOTE — PATIENT INSTRUCTIONS
Discharge Instructions for Low Blood Pressure (Hypotension)  You have been diagnosed with hypotension. When you have hypotension, your blood pressure is lower than normal. Low blood pressure can make you feel dizzy or faint. This condition is sometimes a side effect of taking certain medicines, including medicines for high blood pressure (hypertension). It can also result from medical conditions such as dehydration.  Home care  These home care steps can help manage your condition:  · Follow your doctors instructions.  · Rest in bed and ask for help with daily activities until you feel better. You may need to slowly increase the amount of time you spend sitting or doing light activity.  · Dont drive while your blood pressure is not controlled.  · Be careful when you get up from sitting or lying down.  ¨ Take your time. Sudden movements can cause dizziness or fainting.  ¨ When you first sit up after lying down, be sure to sit in bed for 30 seconds or so before getting up to walk.  · Tell your doctor about the medicines you are taking. Many kinds of medicines trigger low blood pressure.  · Limit your alcohol intake to no more than 2 drinks a day for men and 1 drink a day for women. Alcohol can dehydrate you even further. It can also interfere with the effectiveness of medicines.  · Prevent dehydration by drinking plenty of fluids, unless otherwise instructed by your doctor  · Learn to take your own blood pressure. Keep a record of your results. Ask your doctor which readings mean that you need medical attention.  · Tell your family members to call an ambulance if you become unconscious. Request that they learn CPR.  Follow-up care  Make a follow-up appointment as directed by our staff.     When to seek medical care  Call your doctor immediately if you have any of the following:  · Chest pain or shortness of breath (call 911)  · Dizziness or fainting spells  · Black, maroon, or tarry stools  · Irregular  heartbeat  · Stiff neck  · Severe upper back pain  · Diarrhea or vomiting that doesnt go away  · Inability to eat or drink  · Burning sensation when you urinate  · Urine with a strong, unpleasant odor  · Fainting with exercise   Date Last Reviewed: 4/27/2016  © 6930-5850 [employer]. [address] [place] [address], [place] [address]. All rights reserved. This information is not intended as a substitute for professional medical care. Always follow your healthcare professional's instructions.

## 2017-02-23 ENCOUNTER — TELEPHONE (OUTPATIENT)
Dept: INTERNAL MEDICINE | Facility: CLINIC | Age: 57
End: 2017-02-23

## 2017-02-23 NOTE — TELEPHONE ENCOUNTER
Please contact patient to schedule for earlier hospital f/u visit.   I would still strongly encourage her to be seen in priority clinic if possible, but ok to schedule her for PCP visit if she continues to decline this.

## 2017-03-01 ENCOUNTER — OFFICE VISIT (OUTPATIENT)
Dept: PRIMARY CARE CLINIC | Facility: CLINIC | Age: 57
End: 2017-03-01
Payer: MEDICARE

## 2017-03-01 ENCOUNTER — ANTI-COAG VISIT (OUTPATIENT)
Dept: CARDIOLOGY | Facility: CLINIC | Age: 57
End: 2017-03-01
Payer: MEDICARE

## 2017-03-01 VITALS
OXYGEN SATURATION: 94 % | HEART RATE: 93 BPM | WEIGHT: 262.81 LBS | SYSTOLIC BLOOD PRESSURE: 84 MMHG | BODY MASS INDEX: 39.83 KG/M2 | HEIGHT: 68 IN | DIASTOLIC BLOOD PRESSURE: 64 MMHG

## 2017-03-01 DIAGNOSIS — Z99.2 ESRD ON HEMODIALYSIS: Chronic | ICD-10-CM

## 2017-03-01 DIAGNOSIS — I48.0 PAROXYSMAL ATRIAL FIBRILLATION: ICD-10-CM

## 2017-03-01 DIAGNOSIS — E27.49 SECONDARY ADRENAL INSUFFICIENCY: ICD-10-CM

## 2017-03-01 DIAGNOSIS — Z79.01 LONG-TERM (CURRENT) USE OF ANTICOAGULANTS: Primary | ICD-10-CM

## 2017-03-01 DIAGNOSIS — I48.19 ATRIAL FIBRILLATION, PERSISTENT: ICD-10-CM

## 2017-03-01 DIAGNOSIS — I50.42 CHRONIC COMBINED SYSTOLIC AND DIASTOLIC HEART FAILURE: Chronic | ICD-10-CM

## 2017-03-01 DIAGNOSIS — D86.0 PULMONARY SARCOIDOSIS: Chronic | ICD-10-CM

## 2017-03-01 DIAGNOSIS — N18.6 ESRD ON HEMODIALYSIS: Chronic | ICD-10-CM

## 2017-03-01 DIAGNOSIS — D63.1 ANEMIA IN ESRD (END-STAGE RENAL DISEASE): Chronic | ICD-10-CM

## 2017-03-01 DIAGNOSIS — E78.5 HYPERLIPIDEMIA, UNSPECIFIED HYPERLIPIDEMIA TYPE: Chronic | ICD-10-CM

## 2017-03-01 DIAGNOSIS — J96.11 CHRONIC RESPIRATORY FAILURE WITH HYPOXIA: Chronic | ICD-10-CM

## 2017-03-01 DIAGNOSIS — G40.909 SEIZURE DISORDER: Chronic | ICD-10-CM

## 2017-03-01 DIAGNOSIS — N18.6 ANEMIA IN ESRD (END-STAGE RENAL DISEASE): Chronic | ICD-10-CM

## 2017-03-01 DIAGNOSIS — I95.89 HYPOTENSION, CHRONIC: Primary | ICD-10-CM

## 2017-03-01 LAB — INR PPP: 2.7 (ref 2–3)

## 2017-03-01 PROCEDURE — 99499 UNLISTED E&M SERVICE: CPT | Mod: S$GLB,,, | Performed by: NURSE PRACTITIONER

## 2017-03-01 PROCEDURE — 85610 PROTHROMBIN TIME: CPT | Mod: QW,S$GLB,, | Performed by: PHARMACIST

## 2017-03-01 PROCEDURE — 99211 OFF/OP EST MAY X REQ PHY/QHP: CPT | Mod: 25,S$GLB,, | Performed by: PHARMACIST

## 2017-03-01 PROCEDURE — 99999 PR PBB SHADOW E&M-EST. PATIENT-LVL V: CPT | Mod: PBBFAC,,, | Performed by: NURSE PRACTITIONER

## 2017-03-01 NOTE — PROGRESS NOTES
Patient reports that her metoprolol was discontinued. No other changes reported. No missed doses or new medications. Patient was re-educated on situations that would require placing a call to the coumadin clinic, including bleeding or unusual bruising issues, changes in health, diet or medications, upcoming procedures that require warfarin interruption, and missed coumadin dose(s). Patient expressed understanding that avoidance of consistency with these parameters could cause fluctuations in INR, leading to more frequent visits and increase risk of adverse events.     Patient/caretaker advised by student, Sissy. I have reviewed the student's initial findings and agree with their assessment.  I have personally spoken with and assessed the patient in clinic to devise care plan.

## 2017-03-01 NOTE — MR AVS SNAPSHOT
[facility]  [address]  [facility] [address]  Phone: [phone]                  Sisi Medel   3/1/2017 10:00 AM   Office Visit    Description:  Female : 1960   Provider:  TYRESE Duggan   Department:  [facility]           Reason for Visit     Hospital Follow Up           Diagnoses this Visit        Comments    Hypotension, chronic    -  Primary     Chronic respiratory failure with hypoxia         Seizure disorder         Pulmonary sarcoidosis         Secondary pulmonary hypertension         Atrial fibrillation, persistent         Chronic combined systolic and diastolic heart failure         Secondary adrenal insufficiency         Anemia in ESRD (end-stage renal disease)         ESRD on hemodialysis         Hyperlipidemia, unspecified hyperlipidemia type                To Do List           Future Appointments        Provider Department Dept Phone    3/6/2017 2:30 PM VINAY Harvey MD [facility] - Pulmonary Services [phone]    3/8/2017 8:30 AM Ewelina Ramey, PharmD [facility] Coumadin [phone]    3/8/2017 9:00 AM Cirilo Broussard MD [facility] - Endo/Diab/Metab [phone]    3/27/2017 11:20 AM Carlos A Caputo MD [facility] - Internal Medicine [phone]    2017 2:20 PM Silas Harrell III, MD [facility] - Neurology [phone]      Goals (5 Years of Data)     None      [facility]     [facility] On Call Nurse Care Line - 24/7 Assistance  Registered nurses in the [facility] On Call Center provide clinical advisement, health education, appointment booking, and other advisory services.  Call for this free service at [phone].             Medications           Message regarding Medications     Verify the changes and/or additions to your medication regime listed below are the same as discussed with your clinician today.  If any of these changes or additions are incorrect, please notify your healthcare provider.              Verify that the below list of medications is an accurate representation of the medications you are currently taking.  If none reported, the list may be blank. If incorrect, please contact your healthcare provider. Carry this list with you in case of emergency.           Current Medications     ACZONE 5 % topical gel Apply to face every morning    ammonium lactate (LAC-HYDRIN) 12 % lotion Apply topically as needed (to legs).     atorvastatin (LIPITOR) 80 MG tablet Take 1 tablet (80 mg total) by mouth nightly.    cholecalciferol, vitamin D3, 10,000 unit Tab Take 1 tablet by mouth once daily.    clopidogrel (PLAVIX) 75 mg tablet Take 1 tablet (75 mg total) by mouth once daily.    diclofenac sodium 1 % Gel Apply 2 g topically every 8 (eight) hours as needed.    docusate sodium (COLACE) 100 MG capsule Take 100 mg by mouth 2 (two) times daily as needed for Constipation.    econazole nitrate 1 % cream Apply topically 2 (two) times daily.     fludrocortisone (FLORINEF) 0.1 mg Tab Take 1 tablet (100 mcg total) by mouth 2 (two) times daily.    gabapentin (NEURONTIN) 300 MG capsule Take 1 capsule (300 mg total) by mouth 3 (three) times daily.    inulin-sorbitol 2 gram Chew Take 2 tablets by mouth 2 (two) times daily.    ketorolac 0.5% (ACULAR) 0.5 % Drop instill one drop into both eyes every morning    lactulose (CHRONULAC) 20 gram/30 mL Soln Take 15 mLs (10 g total) by mouth 3 (three) times daily as needed (for constipation).    levetiracetam (KEPPRA) 500 MG Tab Take 1 tablet (500 mg total) by mouth 2 (two) times daily.    midodrine (PROAMATINE) 5 MG Tab Take 2 tablets (10 mg total) by mouth every Mon, Wed, Fri, Sun.    midodrine (PROAMATINE) 5 MG Tab Take 3 tablets (15 mg total) by mouth every Tues, Thurs, Sat. Take 2 tablets (10 mg total) by mouth on non dialysis days.    omeprazole (PRILOSEC) 20 MG capsule Take 1 capsule (20 mg total) by mouth once daily.    oxycodone-acetaminophen (PERCOCET) 7.5-325 mg per  tablet Take 1 tablet by mouth every 4 (four) hours as needed for Pain.    polyethylene glycol (GLYCOLAX) 17 gram/dose powder Take 17 g by mouth once daily. Dissolve in juice.    prednisoLONE acetate (PRED FORTE) 1 % DrpS INSTILL ONE DROP INTO  LEFT EYE THREE TIMES A DAY    predniSONE (DELTASONE) 10 MG tablet Takes one 10 mg tablet every morning    RENVELA 800 mg Tab TAKE 1 TABLET BY MOUTH THREE TIMES DAILY WITH MEALS    warfarin (COUMADIN) 5 MG tablet TAKE 1 TABLET BY MOUTH EVERYDAY EXCEPT 1 AND 1/2 TABLETS ON MONDAY AND FRIDAY OR AS DIRECTED BY CLINIC           Clinical Reference Information           Your Vitals Were     BP                   84/64 (BP Location: Right arm, Patient Position: Sitting)           Blood Pressure          Most Recent Value    BP  (!)  84/64      Allergies as of 3/1/2017     Bactrim [Sulfamethoxazole-trimethoprim]    Nsaids (Non-steroidal Anti-inflammatory Drug)      Immunizations Administered on Date of Encounter - 3/1/2017     None      Maintenance Dialysis History     Start End Type Comments Center    2/17/2016  [facility]            Current D[facility] Center Information     [facility] [address] Phone #:  [phone]    Contact:  [place]  [address] Fax #:  [phone]            [facility] Sign-Up     Activating your [facility] account is as easy as 1-2-3!     1) Visit [email or web address], select Sign Up Now, enter this activation code and your date of birth, then select Next.  [email or web address]  Expires: 4/15/2017 10:06 AM      2) Create a username and password to use when you visit [facility] in the future and select a security question in case you lose your password and select Next.    3) Enter your e-mail address and click Sign Up!    Additional Information  If you have questions, please e-mail [email or web address] or call [phone] to talk to our [facility] staff. Remember, [facility] is NOT to be used for urgent needs. For medical emergencies, dial 911.          Language Assistance Services     ATTENTION: Language assistance services are available, free of charge. Please call [phone].      ATENCIÓN: Si habla denisa, tiene a muniz disposición servicios gratuitos de asistencia lingüística. Llame al [phone].     CHÚ Ý: N?u b?n nói Ti?ng Vi?t, có các d?ch v? h? tr? ngôn ng? mi?n phí dành cho b?n. G?i s? [phone].         [facility] complies with applicable Federal civil rights laws and does not discriminate on the basis of race, color, national origin, age, disability, or sex.

## 2017-03-01 NOTE — Clinical Note
[facility] Visit (Post Discharge Follow-up) Today:  - Our clinic physicians and nurses plan to follow the patient up for any medical issues in the [facility] for 30 days post discharge.  Future Appointments: 3/6/2017   2:30 PM    VINAY Harvey MD      [facility] PULMSVC   [facility] 3/8/2017   8:30 AM    Ewelina Ramey, PharmD  [facility] COUMAD    [facility] 3/8/2017   9:00 AM    Cirilo Broussard MD          [facility] ENDOCRN   [facility] 3/27/2017  11:20 AM   Carlos A Caputo MD     [facility] IM        [facility]  4/7/2017   2:20 PM    Silas Harrell III, MD      [facility] NEURO     [facility] 4/28/2017  8:00 AM    Carlos A Caputo MD     [facility] IM        [facility]  4/28/2017  12:30 PM   EKG, APPT                  [facility] EKG       [facility] 4/28/2017  1:00 PM    PACEMAKER, ICD             [facility] ARRHYTH   [facility] 4/28/2017  1:40 PM    Bladimir Adorno MD        [facility] ARRHYTH   [facility] 5/26/2017  12:30 PM   VASCULAR, LAB              [facility] VASCLAB   [facility] 5/26/2017  1:00 PM    Torrey Villafana MD    [facility]

## 2017-03-01 NOTE — PROGRESS NOTES
[facility]  New Visit Progress Note   Recent Hospital Discharge     PRESENTING HISTORY     Chief Complaint/Reason for Visit:  Follow up Hospital Discharge   No chief complaint on file.    PCP: Carlos A Caputo MD    History of Present Illness: Ms. Sisi Medel is a 56 y.o. female who was recently admitted to the hospital.    [facility] Medicine  Discharge Summary        Patient Name: Sisi Medel  MRN: [id number]  Admission Date: 2/17/2017  Hospital Length of Stay: 2 days  Discharge Date and Time:  02/19/2017 8:17 AM  Attending Physician: Cara Ponce MD   Discharging Provider: Cara Ponce MD  Primary Care Provider: Carlos A Caputo MD        HPI:   Ms. Medel is a 56 year old woman with ESRD on HD at [facility] every TTS who presented with generalized weakness and fatigue and very low BP associated with slurred speech. 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 She was fairly stable on fludrocortisone prescribed by her nephrologist and midodrine 15 mg prior to HD and 5 mg on non HD days. She was recently hospitalized at [facility] for a declot of her AVF and had very low BP requiring stress dosing of hydrocortisone and CRRT. In addition she was started on metoprolol 12.5 mg bid for chronic atrial fibrillation and her fludrocortisone was stopped as she was felt to have secondary adrenal insufficiency due to chronic steroid use and fludrocortisone is generally not helpful for this indication. She was started on Plavix after declot of the fistula. She followed up with the post-hospitalization clinic at [facility] on the day prior to this admission with multiple complaints and was offered hospitalization again but declined.      Workup in the ED on this hospitalization included labs that were at her baseline, lactic acid 2.8 and INR 2. CXR showed stable  "interstitial edema. She was given several boluses of IV fluids for BP 79/33 and eventually her BP came up to close to 100 systolic. She was alert and oriented when I saw her in the ED.     Patient has a biventricular pacemaker placed 1/11/17 in preparation for RFA of atrial fibrillation.     Indwelling Lines/Drains at time of discharge:   Lines/Drains/Airways            Central Venous Catheter Line                             Hemodialysis Catheter 02/04/17 1315 right internal jugular 14 days                    Drain                             Hemodialysis AV Graft Left upper arm -- days      Hemodialysis AV Graft Left upper arm -- days      Hemodialysis AV Fistula 11/27/16 0920 Left upper arm 83 days                   Hospital Course:   Patient was admitted and her fludrocortisone was resumed along with the midodrine and stress dosing of hydrocortisone. She underwent HD on 2/18 without any further issues in her blood pressure. Hydrocortisone was discontinued and her midodrine dosing was changed to 10 mg po on non HD days and 15 mg po on HD days. After discussing the matter with the nephrology service, we decided to increase the fludrocortisone dose to 0.1 mg bid. Although her labs are not consistent with primary adrenal insufficiency her blood pressure does improve with the medication, and the decrease in her blood pressure coincided with discontinuation of it.      Hydrocortisone was discontinued the day prior to discharge and her BP was well supported around 110 systolic. She was able to walk without feeling dizzy and felt comfortable being discharged home.       ______________________________________________    Today:  Ms. Laird presents to  today, ambulating with her rolling walker and no support. She is very jolly and conversant. Reports, "feeling great". She did not take her Midodrine this morning. State, "it makes me feel dizzy and wanted to walk to the clinic appointment today".  Denies sob, chest " discomfort, coughing, fever, chills, or other discomforts today.     Review of Systems:  Eyes: denies visual changes at this time denies floaters   ENT: no nasal congestion or sore throat  Respiratory: no cough or shorness of breath  Cardiovascular: no chest pain or palpitations  Gastrointestinal: no nausea or vomiting, no abdominal pain or change in bowel habits  Genitourinary: no hematuria or dysuria; denies frequency  Hematologic/Lymphatic: no easy bruising or lymphadenopathy  Musculoskeletal: no arthralgias or myalgias  Neurological: no seizures or tremors  Endocrine: no heat or cold intolerance      PAST HISTORY:     Past Medical History:   Diagnosis Date    Anemia in ESRD (end-stage renal disease) 3/7/2016    Anticoagulant long-term use     Cervical radiculopathy 2015    Chronic combined systolic and diastolic heart failure 2013    EF 20% , improved with Medical therapy, negative PET     Chronic respiratory failure with hypoxia 2013    On home oxygen at 2-5 liters    Closed fracture of proximal end of right fibula 2016    Complications due to renal dialysis device, implant, and graft     DDD (degenerative disc disease), lumbar 2015    ESRD on hemodialysis 2016    Essential hypertension     Gout     Lateral meniscal tear 2016    Lumbar stenosis 2015    Paroxysmal atrial fibrillation 2014    Not on anticoagulation    Peripheral neuropathy 2013    Personal history of gastric ulcer 3/19/2013    Sarcoidosis, lung 2009    Diagnosed in  with ocular manifestation, on 4L home O2 and PO steroids    Secondary pulmonary hypertension 2015    Seizure disorder 3/19/2013    Shingles 2013    Thyroid disease        Past Surgical History:   Procedure Laterality Date    ABDOMINAL SURGERY       SECTION      x2    OTHER SURGICAL HISTORY      loop recorder implant    VASCULAR SURGERY         Family History   Problem Relation  Age of Onset    Kidney failure Mother     Hypertension Mother     Diabetes Mother     Coronary artery disease Father     Hypertension Father     Diabetes Father     Lupus Sister     Diabetes Maternal Grandmother     Colon cancer Neg Hx     Ovarian cancer Neg Hx     Breast cancer Neg Hx     Melanoma Neg Hx        Social History     Social History    Marital status: Single     Spouse name: N/A    Number of children: N/A    Years of education: N/A     Social History Main Topics    Smoking status: Former Smoker     Packs/day: 0.50     Years: 10.00     Types: Cigarettes     Quit date: 4/22/1994    Smokeless tobacco: Never Used    Alcohol use No    Drug use: No    Sexual activity: Not Asked     Other Topics Concern    None     Social History Narrative    Lives with boyfriend/partner who helps with her care.     Plans to travel to [place] for 2 weeks in 9/2016       MEDICATIONS & ALLERGIES:     Current Outpatient Prescriptions on File Prior to Visit   Medication Sig Dispense Refill    ACZONE 5 % topical gel Apply to face every morning  2    ammonium lactate (LAC-HYDRIN) 12 % lotion Apply topically as needed (to legs).   2    atorvastatin (LIPITOR) 80 MG tablet Take 1 tablet (80 mg total) by mouth nightly. 90 tablet 1    cholecalciferol, vitamin D3, 10,000 unit Tab Take 1 tablet by mouth once daily.      clopidogrel (PLAVIX) 75 mg tablet Take 1 tablet (75 mg total) by mouth once daily. 30 tablet 11    diclofenac sodium 1 % Gel Apply 2 g topically every 8 (eight) hours as needed. 100 g 0    docusate sodium (COLACE) 100 MG capsule Take 100 mg by mouth 2 (two) times daily as needed for Constipation.      econazole nitrate 1 % cream Apply topically 2 (two) times daily.       fludrocortisone (FLORINEF) 0.1 mg Tab Take 1 tablet (100 mcg total) by mouth 2 (two) times daily. 60 tablet 11    gabapentin (NEURONTIN) 300 MG capsule Take 1 capsule (300 mg total) by mouth 3 (three) times daily. 270 capsule 0     inulin-sorbitol 2 gram Chew Take 2 tablets by mouth 2 (two) times daily.  0    ketorolac 0.5% (ACULAR) 0.5 % Drop instill one drop into both eyes every morning  3    lactulose (CHRONULAC) 20 gram/30 mL Soln Take 15 mLs (10 g total) by mouth 3 (three) times daily as needed (for constipation). 450 mL 1    levetiracetam (KEPPRA) 500 MG Tab Take 1 tablet (500 mg total) by mouth 2 (two) times daily. 60 tablet 5    midodrine (PROAMATINE) 5 MG Tab Take 2 tablets (10 mg total) by mouth every Mon, Wed, Fri, Sun. 32 tablet 11    midodrine (PROAMATINE) 5 MG Tab Take 3 tablets (15 mg total) by mouth every Tues, Thurs, Sat. Take 2 tablets (10 mg total) by mouth on non dialysis days.      omeprazole (PRILOSEC) 20 MG capsule Take 1 capsule (20 mg total) by mouth once daily. 30 capsule 5    oxycodone-acetaminophen (PERCOCET) 7.5-325 mg per tablet Take 1 tablet by mouth every 4 (four) hours as needed for Pain.      polyethylene glycol (GLYCOLAX) 17 gram/dose powder Take 17 g by mouth once daily. Dissolve in juice. 510 g 0    prednisoLONE acetate (PRED FORTE) 1 % DrpS INSTILL ONE DROP INTO  LEFT EYE THREE TIMES A DAY  3    predniSONE (DELTASONE) 10 MG tablet Takes one 10 mg tablet every morning (Patient taking differently: Takes one 10 mg tablet every morning with breakfast) 60 tablet 3    RENVELA 800 mg Tab TAKE 1 TABLET BY MOUTH THREE TIMES DAILY WITH MEALS 90 tablet 0    warfarin (COUMADIN) 5 MG tablet TAKE 1 TABLET BY MOUTH EVERYDAY EXCEPT 1 AND 1/2 TABLETS ON MONDAY AND FRIDAY OR AS DIRECTED BY CLINIC (Patient taking differently: 1 tablet daily) 189 tablet 0     No current facility-administered medications on file prior to visit.         Review of patient's allergies indicates:   Allergen Reactions    Bactrim [sulfamethoxazole-trimethoprim] Other (See Comments)     Causes renal failure    Nsaids (non-steroidal anti-inflammatory drug) Other (See Comments)     Renal failure       OBJECTIVE:     Vital  Signs:  There were no vitals filed for this visit.  Wt Readings from Last 1 Encounters:   02/20/17 1303 120.7 kg (266 lb)     There is no height or weight on file to calculate BMI.   Wt Readings from Last 3 Encounters:   03/01/17 119.2 kg (262 lb 12.6 oz)   02/20/17 120.7 kg (266 lb)   02/17/17 111.1 kg (244 lb 14.9 oz)     Temp Readings from Last 3 Encounters:   02/20/17 96.1 °F (35.6 °C) (Oral)   02/19/17 97.8 °F (36.6 °C) (Oral)   02/11/17 98.7 °F (37.1 °C) (Oral)     BP Readings from Last 3 Encounters:   03/01/17 (!) 84/64   02/20/17 117/74   02/19/17 (!) 109/57     Pulse Readings from Last 3 Encounters:   03/01/17 93   02/20/17 94   02/19/17 104       Physical Exam:  General: Well developed, well nourished. No distress.  HEENT: Head is normocephalic, atraumatic; ears are normal.   Eyes: Clear conjunctiva.  Neck: Supple, symmetrical neck; trachea midline. No JVD  Lungs: Clear to auscultation bilaterally and normal respiratory effort.  Cardiovascular: Heart with regular rate and rhythm. No murmurs, gallops or rubs  Extremities: No LE edema. Pulses 2+ and symmetric.   Abdomen: Abdomen is soft, non-tender non-distended with normal bowel sounds.  Skin: Skin color, texture, turgor normal. No rashes.  Lymph Nodes: No cervical or supraclavicular adenopathy.  Neurologic: No focal numbness or weakness.   Psychiatric: Not depressed    Laboratory  Lab Results   Component Value Date    WBC 15.42 (H) 02/19/2017    HGB 9.2 (L) 02/19/2017    HCT 29.9 (L) 02/19/2017    MCV 99 (H) 02/19/2017     (H) 02/19/2017     BMP  Lab Results   Component Value Date     (L) 02/19/2017    K 4.1 02/19/2017    CL 97 02/19/2017    CO2 29 02/19/2017    BUN 27 (H) 02/19/2017    CREATININE 6.0 (H) 02/19/2017    CALCIUM 9.3 02/19/2017    ANIONGAP 9 02/19/2017    ESTGFRAFRICA 8 (A) 02/19/2017    EGFRNONAA 7 (A) 02/19/2017     Lab Results   Component Value Date    ALT 8 (L) 02/19/2017    AST 15 02/19/2017    ALKPHOS 87 02/19/2017     BILITOT 0.3 02/19/2017     Lab Results   Component Value Date    INR 2.7 03/01/2017    INR 2.5 (H) 02/20/2017    INR 2.0 (H) 02/17/2017     Lab Results   Component Value Date    HGBA1C 5.7 05/17/2016     No results for input(s): POCTGLUCOSE in the last 72 hours.        TRANSITION OF CARE:     [facility] On Call Contact Note: 2/21/2017    Family and/or Caretaker present at visit?  Yes.  Diagnostic tests reviewed/disposition: I have reviewed all completed as well as pending diagnostic tests at the time of discharge.  Disease/illness education:  Hypotenison, Adrenal Insufficiency, Chronic Steroid use, Sarcoidosis, HTN, A fib, Seizure Disorder, CHF, Meds  Home health/community services discussion/referrals: Patient does not have home health established from hospital visit.  They do not need home health.  If needed, we will set up home health for the patient  Establishment or re-establishment of referral orders for community resources: No other necessary community resources.   Discussion with other health care providers: No discussion with other health care providers necessary.     Medications Reconciliation:   I have reconciled the patient's home medications and discharge medications with the patient/family. I have updated all changes.  Refer to After-Visit Medication List.    ASSESSMENT & PLAN:     HIGH RISK CONDITION(S):        Recent readmission for Hypotensive Event (79/33):  IV fluid resuscitated and adjustments made to her Midodrine  *10 mg on Non Dialysis Days  *15 mg on Dialysis Days  Blood Pressure today: 84/64 (did not take Midodrine yet today; have reinforced the [id number]; voiced understanding)  - continue Fludrocortisone to 0.1 mg bid as it seems to have helped her (although endocrinology evaluation has been started and they feel she does not need it).         Chronic Hypotension with history of Chronic Immunosuppression 2/2 Adrenal Insufficiency:   *Of note, she was treated with stress dose  Hydrocortisone while in the hospital.  - Continue Prednisone 10 mg daily, uninterrupted therapy   - continue current regimen of Midodrine therapy   - Beta Blocker therapy discontinued     ESRD on hemodialysis, with recent Admission for declotting on 2/7/2017:  Melina: Fran HOFF, Sat  - continue Renvella   - follow up with Vascular on 5/26      Pulmonary sarcoidosis:  (Stable)  Hx of Pulmonary Sarcoidosis, on home oxygen (4-5 L ), follows with  (apt 3/6).   - continue Prednisone 10 mg daily   Resumed Prednisone 10 mg 2/9/2017.  - Stress dosing of Hydrocortisone completed while IP.           Chronic use of systemic steroids:  -Continue Prednisone 10 mg daily and discharge on this dose per the recs of Endocrine  - follow up with Endo on 3/8/2017        History of Seizure disorder:  (stable and without any recent activity)  - Continue Keppra BID  *Mother didn't understand why she is on this medication, despite continued teaching on it's purpose and the need to not stop, as it is use for long term management. She disagreed (??).         Paroxysmal atrial fibrillation:  Rate today: 93  - continue Coumadin therapy and follow up with Coumadin clinic   *INR today: 2.7 (therapeutic)   - follow up in Coumadin clinic on 3/8  - Beta Blocker therapy discontinued       Chronic combined systolic and diastolic heart failure:   No clinical evidence of Cardiac Decompensation       Immunizations:  PPSV 23: 6/ 2016  Flu Vaccine at [facility] (2016)       [facility] Visit (Post Discharge Follow-up) Today:   - Our clinic physicians and nurses plan to follow the patient up for any medical issues in the [facility] for 30 days post discharge.      Future Appointments  Date Time Provider Department Center   3/6/2017 2:30 PM VINAY Harvey MD [facility] PULMSVC [facility]   3/8/2017 8:30 AM Ewelina Ramey, PharmD [facility] COUMAD [facility]   3/8/2017 9:00 AM Cirilo Broussard MD [facility] ENDOCRN [facility]   3/27/2017 11:20 AM Carlos A CAMPOS  MD [facility]   4/7/2017 2:20 PM Silas Harrell III, MD [facility] NEURO [facility]   4/28/2017 8:00 AM Carlos A Caputo MD [facility]   4/28/2017 12:30 PM EKG, APPT [facility] EKG [facility]   4/28/2017 1:00 PM PACEMAKER, ICD [facility] ARRHYTH [facility]   4/28/2017 1:40 PM Bladimir Adorno MD [facility] ARRHYTH [facility]   5/26/2017 12:30 PM VASCULAR, LAB [facility] VASCLAB [facility]   5/26/2017 1:00 PM Torrey Villafana MD [facility] VASCSUR [facility]     *Presented a list of all of her medications.        Medication List          This list is accurate as of: 3/1/17 10:17 AM.  Always use your most recent med list.                     ACZONE 5 % topical gel   Generic drug:  dapsone       ammonium lactate 12 % lotion   Commonly known as:  LAC-HYDRIN       atorvastatin 80 MG tablet   Commonly known as:  LIPITOR   Take 1 tablet (80 mg total) by mouth nightly.       cholecalciferol (vitamin D3) 10,000 unit Tab       clopidogrel 75 mg tablet   Commonly known as:  PLAVIX   Take 1 tablet (75 mg total) by mouth once daily.       diclofenac sodium 1 % Gel   Apply 2 g topically every 8 (eight) hours as needed.       docusate sodium 100 MG capsule   Commonly known as:  COLACE       econazole nitrate 1 % cream       fludrocortisone 0.1 mg Tab   Commonly known as:  FLORINEF   Take 1 tablet (100 mcg total) by mouth 2 (two) times daily.       gabapentin 300 MG capsule   Commonly known as:  NEURONTIN   Take 1 capsule (300 mg total) by mouth 3 (three) times daily.       inulin-sorbitol 2 gram Chew   Take 2 tablets by mouth 2 (two) times daily.       ketorolac 0.5% 0.5 % Drop   Commonly known as:  ACULAR       lactulose 20 gram/30 mL Soln   Commonly known as:  CHRONULAC   Take 15 mLs (10 g total) by mouth 3 (three) times daily as needed (for constipation).       levetiracetam 500 MG Tab   Commonly known as:  KEPPRA   Take 1 tablet (500 mg total) by mouth 2 (two) times daily.       * midodrine 5 MG Tab   Commonly known as:  PROAMATINE   Take  2 tablets (10 mg total) by mouth every Mon, Wed, Fri, Sun.       * midodrine 5 MG Tab   Commonly known as:  PROAMATINE   Take 3 tablets (15 mg total) by mouth every Tues, Thurs, Sat. Take 2 tablets (10 mg total) by mouth on non dialysis days.       omeprazole 20 MG capsule   Commonly known as:  PRILOSEC   Take 1 capsule (20 mg total) by mouth once daily.       oxycodone-acetaminophen 7.5-325 mg per tablet   Commonly known as:  PERCOCET       polyethylene glycol 17 gram/dose powder   Commonly known as:  GLYCOLAX   Take 17 g by mouth once daily. Dissolve in juice.       prednisoLONE acetate 1 % Drps   Commonly known as:  PRED FORTE       predniSONE 10 MG tablet   Commonly known as:  DELTASONE   Takes one 10 mg tablet every morning       RENVELA 800 mg Tab   Generic drug:  sevelamer carbonate   TAKE 1 TABLET BY MOUTH THREE TIMES DAILY WITH MEALS       warfarin 5 MG tablet   Commonly known as:  COUMADIN   TAKE 1 TABLET BY MOUTH EVERYDAY EXCEPT 1 AND 1/2 TABLETS ON MONDAY AND FRIDAY OR AS DIRECTED BY CLINIC       * Notice:  This list has 2 medication(s) that are the same as other medications prescribed for you. Read the directions carefully, and ask your doctor or other care provider to review them with you.          Signing Physician:  TYRESE Jon

## 2017-03-06 ENCOUNTER — HOSPITAL ENCOUNTER (OUTPATIENT)
Dept: PULMONOLOGY | Facility: CLINIC | Age: 57
Discharge: HOME | End: 2017-03-06
Payer: MEDICARE

## 2017-03-06 ENCOUNTER — OFFICE VISIT (OUTPATIENT)
Dept: PULMONOLOGY | Facility: CLINIC | Age: 57
End: 2017-03-06
Payer: MEDICARE

## 2017-03-06 VITALS
BODY MASS INDEX: 40.77 KG/M2 | HEIGHT: 68 IN | HEART RATE: 113 BPM | OXYGEN SATURATION: 97 % | DIASTOLIC BLOOD PRESSURE: 80 MMHG | WEIGHT: 269 LBS | SYSTOLIC BLOOD PRESSURE: 120 MMHG | RESPIRATION RATE: 16 BRPM

## 2017-03-06 DIAGNOSIS — J98.4 CRLD (CHRONIC RESTRICTIVE LUNG DISEASE): ICD-10-CM

## 2017-03-06 DIAGNOSIS — Z99.2 ESRD ON HEMODIALYSIS: Chronic | ICD-10-CM

## 2017-03-06 DIAGNOSIS — D86.0 PULMONARY SARCOIDOSIS: Chronic | ICD-10-CM

## 2017-03-06 DIAGNOSIS — D86.9 SARCOID: Chronic | ICD-10-CM

## 2017-03-06 DIAGNOSIS — E66.01 MORBID OBESITY WITH BMI OF 40.0-44.9, ADULT: Chronic | ICD-10-CM

## 2017-03-06 DIAGNOSIS — I50.42 CHRONIC COMBINED SYSTOLIC AND DIASTOLIC HEART FAILURE: Primary | Chronic | ICD-10-CM

## 2017-03-06 DIAGNOSIS — K21.9 GASTROESOPHAGEAL REFLUX DISEASE WITHOUT ESOPHAGITIS: ICD-10-CM

## 2017-03-06 DIAGNOSIS — N18.6 ESRD ON HEMODIALYSIS: Chronic | ICD-10-CM

## 2017-03-06 DIAGNOSIS — J96.11 CHRONIC RESPIRATORY FAILURE WITH HYPOXIA: Chronic | ICD-10-CM

## 2017-03-06 LAB
PRE FEV1 FVC: 84
PRE FEV1: 1.69
PRE FVC: 2.01
PREDICTED FEV1 FVC: 79
PREDICTED FEV1: 2.83
PREDICTED FVC: 3.54

## 2017-03-06 PROCEDURE — 99999 PR PBB SHADOW E&M-EST. PATIENT-LVL III: CPT | Mod: PBBFAC,,, | Performed by: INTERNAL MEDICINE

## 2017-03-06 PROCEDURE — 99214 OFFICE O/P EST MOD 30 MIN: CPT | Mod: 25,S$GLB,, | Performed by: INTERNAL MEDICINE

## 2017-03-06 PROCEDURE — 94729 DIFFUSING CAPACITY: CPT | Mod: S$GLB,,, | Performed by: INTERNAL MEDICINE

## 2017-03-06 PROCEDURE — 94010 BREATHING CAPACITY TEST: CPT | Mod: S$GLB,,, | Performed by: INTERNAL MEDICINE

## 2017-03-06 PROCEDURE — 1160F RVW MEDS BY RX/DR IN RCRD: CPT | Mod: S$GLB,,, | Performed by: INTERNAL MEDICINE

## 2017-03-06 PROCEDURE — 99499 UNLISTED E&M SERVICE: CPT | Mod: S$GLB,,, | Performed by: INTERNAL MEDICINE

## 2017-03-06 NOTE — MR AVS SNAPSHOT
[facility]  [address]  [facility] [address]  Phone: [phone]                  Sisi Medel   3/6/2017 2:30 PM   Office Visit    Description:  Female : 1960   Provider:  VINAY Harvey MD   Department:  [facility]                To Do List           Future Appointments        Provider Department Dept Phone    3/8/2017 8:30 AM Ewelina Ramey, [facility]y - Coumadin [phone]    3/8/2017 9:00 AM MD [facility] - Endo/Diab/Metab [phone]    3/27/2017 11:20 AM MD [facility] Morrell [facility] - Internal Medicine [phone]    2017 2:20 PM MD [facility] Self III [facility] - Neurology [phone]    2017 8:00 AM Carlos A Caputo MD [facility] - Internal Medicine [phone]      Goals (5 Years of Data)     None      [facility] On [facility]     [facility] On Call Nurse [facility] Line -  Assistance  Registered nurses in the [facility] On Call Center provide clinical advisement, health education, appointment booking, and other advisory services.  Call for this free service at [phone].             Medications           Message regarding Medications     Verify the changes and/or additions to your medication regime listed below are the same as discussed with your clinician today.  If any of these changes or additions are incorrect, please notify your healthcare provider.             Verify that the below list of medications is an accurate representation of the medications you are currently taking.  If none reported, the list may be blank. If incorrect, please contact your healthcare provider. Carry this list with you in case of emergency.           Current Medications     ACZONE 5 % topical gel Apply to face every morning    ammonium lactate (LAC-HYDRIN) 12 % lotion Apply topically as needed (to legs).     atorvastatin (LIPITOR) 80 MG tablet Take 1 tablet (80 mg total) by mouth nightly.    cholecalciferol, vitamin D3,  10,000 unit Tab Take 1 tablet by mouth once daily.    clopidogrel (PLAVIX) 75 mg tablet Take 1 tablet (75 mg total) by mouth once daily.    diclofenac sodium 1 % Gel Apply 2 g topically every 8 (eight) hours as needed.    docusate sodium (COLACE) 100 MG capsule Take 100 mg by mouth 2 (two) times daily as needed for Constipation.    econazole nitrate 1 % cream Apply topically 2 (two) times daily.     fludrocortisone (FLORINEF) 0.1 mg Tab Take 1 tablet (100 mcg total) by mouth 2 (two) times daily.    gabapentin (NEURONTIN) 300 MG capsule Take 1 capsule (300 mg total) by mouth 3 (three) times daily.    inulin-sorbitol 2 gram Chew Take 2 tablets by mouth 2 (two) times daily.    ketorolac 0.5% (ACULAR) 0.5 % Drop instill one drop into both eyes every morning    lactulose (CHRONULAC) 20 gram/30 mL Soln Take 15 mLs (10 g total) by mouth 3 (three) times daily as needed (for constipation).    levetiracetam (KEPPRA) 500 MG Tab Take 1 tablet (500 mg total) by mouth 2 (two) times daily.    midodrine (PROAMATINE) 5 MG Tab Take 2 tablets (10 mg total) by mouth every Mon, Wed, Fri, Sun.    midodrine (PROAMATINE) 5 MG Tab Take 3 tablets (15 mg total) by mouth every Tues, Thurs, Sat. Take 2 tablets (10 mg total) by mouth on non dialysis days.    omeprazole (PRILOSEC) 20 MG capsule Take 1 capsule (20 mg total) by mouth once daily.    oxycodone-acetaminophen (PERCOCET) 7.5-325 mg per tablet Take 1 tablet by mouth every 4 (four) hours as needed for Pain.    polyethylene glycol (GLYCOLAX) 17 gram/dose powder Take 17 g by mouth once daily. Dissolve in juice.    prednisoLONE acetate (PRED FORTE) 1 % DrpS INSTILL ONE DROP INTO  LEFT EYE THREE TIMES A DAY    predniSONE (DELTASONE) 10 MG tablet Takes one 10 mg tablet every morning    RENVELA 800 mg Tab TAKE 1 TABLET BY MOUTH THREE TIMES DAILY WITH MEALS    warfarin (COUMADIN) 5 MG tablet TAKE 1 TABLET BY MOUTH EVERYDAY EXCEPT 1 AND 1/2 TABLETS ON MONDAY AND FRIDAY OR AS DIRECTED BY CLINIC     "       Clinical Reference Information           Your Vitals Were     BP Pulse Height Weight Last Period SpO2    120/80 113 5' 8" (1.727 m) 122 kg (269 lb) (LMP Unknown) 97%    BMI                40.9 kg/m2          Blood Pressure          Most Recent Value    BP  120/80      Allergies as of 3/6/2017     Bactrim [Sulfamethoxazole-trimethoprim]    Nsaids (Non-steroidal Anti-inflammatory Drug)      Immunizations Administered on Date of Encounter - 3/6/2017     None      Maintenance Dialysis History     Start End Type Comments Center    2/17/2016  [facility]            Current D[facility] Center Information     [facility] [address] Phone #:  [phone]    Contact:  [place]  [address] Fax #:  [phone]            [facility] Sign-Up     Activating your [facility] account is as easy as 1-2-3!     1) Visit [email or web address], select Sign Up Now, enter this activation code and your date of birth, then select Next.  [email or web address]  Expires: 4/15/2017 10:06 AM      2) Create a username and password to use when you visit [facility] in the future and select a security question in case you lose your password and select Next.    3) Enter your e-mail address and click Sign Up!    Additional Information  If you have questions, please e-mail [email or web address] or call [phone] to talk to our [facility] staff. Remember, [facility] is NOT to be used for urgent needs. For medical emergencies, dial 911.         Language Assistance Services     ATTENTION: Language assistance services are available, free of charge. Please call [phone].      ATENCIÓN: Si habla español, tiene a muniz disposición servicios gratuitos de asistencia lingüística. Llame al [phone].     CHÚ Ý: N?u b?n nói Ti?ng Vi?t, có các d?ch v? h? tr? ngôn ng? mi?n phí dành cho b?n. G?i s? [phone].         [facility] complies with applicable Federal civil rights laws and does not discriminate on the basis of " race, color, national origin, age, disability, or sex.

## 2017-03-07 NOTE — PATIENT INSTRUCTIONS
Continue present respiratory medications including O2 at 2-4 LPM (roles reviewed at today's visit).  Discussed the early role for antibiotics for treatment of respiratory infection.  Return visit 4 months with Spirometry and DLCO.

## 2017-03-07 NOTE — PROGRESS NOTES
Subjective:       Patient ID: Sisi Medel is a 56 y.o. female.    Chief Complaint: Sarcoidosis    HPI HISTORY OF PRESENT ILLNESS:  Ms. Medel is a 56-year-old former smoker, who   returns for an interval assessment of sarcoidosis.  She was hospitalized   ([facility]) last month due to an episode of low blood   pressure.  At that time, she had adjustments in the medications to support her   blood pressure; and, as a result, her blood pressure has not been a problem during   the last several weeks.    Ms. Medel is not having any symptoms to suggest an active respiratory   infection.  She currently takes a maintenance 10 mg dose of prednisone daily.    She also continues to use nasal oxygen at 2 to 4 L per minute.  She has not had   any chest pain or wheezing.    DATA:  I have reviewed the images from the chest x-ray done last month at the   time of her hospitalization.  The cardiac silhouette is enlarged as has been   seen in previous x-rays.  Perihilar lung markings are increased suggesting an   element of pulmonary edema.  Similar findings have been seen in previous x-rays.    There does not appear to be any significant pleural effusion.    Pulmonary function studies done today show the forced vital capacity is 2.01 L,   which is 57% of predicted.  The FEV1 is 1.69 L, or 60% of predicted.  The ratio   of these values is 84%.  The diffusion capacity is 8.6.  This result will be   adjusted upward modestly due to her chronic anemia.  When the results of today's   studies are compared to the most recent previous study from January, there does   not appear to be any significant change.  The spirometry values are modestly   decreased in comparison to prior studies from 2015 and 2014.  Despite this, the   diffusion capacity, while reduced from normal, remains stable.      KARIE/RENUKA  dd: 03/06/2017 19:41:00 (CST)  td: 03/07/2017 14:45:08 (CST)  Doc ID   #[id number]  Job ID #[id number]    CC:        Review of Systems   Constitutional: Negative for fever and fatigue.   HENT: Negative for postnasal drip, sinus pressure, voice change and congestion.    Respiratory: Positive for shortness of breath and dyspnea on extertion. Negative for cough, sputum production and wheezing.    Cardiovascular: Positive for leg swelling. Negative for chest pain.   Genitourinary: Negative for difficulty urinating.   Musculoskeletal: Positive for arthralgias. Negative for back pain.   Skin: Negative for rash.   Gastrointestinal: Positive for acid reflux. Negative for abdominal pain.   Neurological: Negative for dizziness and weakness.   Hematological: Negative for adenopathy.       Objective:      Physical Exam   Constitutional: She is oriented to person, place, and time. She appears well-developed. She is obese.   HENT:   Head: Normocephalic.   Nose: Nose normal.   Mouth/Throat: No oropharyngeal exudate.   Neck: Normal range of motion. No JVD present. No tracheal deviation present. No thyromegaly present.   Cardiovascular: Normal rate, regular rhythm and normal heart sounds.    No murmur heard.  Pulmonary/Chest: Symmetric chest wall expansion. No stridor. She has no wheezes. She has no rhonchi. She has no rales. She exhibits no tenderness.   Abdominal: Soft. There is no tenderness.   Musculoskeletal: She exhibits edema (mild LE edema).   Lymphadenopathy:     She has no cervical adenopathy.   Neurological: She is alert and oriented to person, place, and time.   Skin: Skin is warm and dry. No rash noted. No erythema. Nails show no clubbing.   Psychiatric: She has a normal mood and affect.   Vitals reviewed.    Personal Diagnostic Review    No flowsheet data found.      Assessment:       1. Chronic combined systolic and diastolic heart failure    2. Chronic respiratory failure with hypoxia    3. ESRD on hemodialysis    4. Gastroesophageal reflux disease without esophagitis    5. Morbid obesity with BMI of 40.0-44.9, adult    6.  CRLD (chronic restrictive lung disease)    7. Pulmonary sarcoidosis        Outpatient Encounter Prescriptions as of 3/6/2017   Medication Sig Dispense Refill    ACZONE 5 % topical gel Apply to face every morning  2    ammonium lactate (LAC-HYDRIN) 12 % lotion Apply topically as needed (to legs).   2    atorvastatin (LIPITOR) 80 MG tablet Take 1 tablet (80 mg total) by mouth nightly. 90 tablet 1    cholecalciferol, vitamin D3, 10,000 unit Tab Take 1 tablet by mouth once daily.      clopidogrel (PLAVIX) 75 mg tablet Take 1 tablet (75 mg total) by mouth once daily. 30 tablet 11    diclofenac sodium 1 % Gel Apply 2 g topically every 8 (eight) hours as needed. 100 g 0    docusate sodium (COLACE) 100 MG capsule Take 100 mg by mouth 2 (two) times daily as needed for Constipation.      econazole nitrate 1 % cream Apply topically 2 (two) times daily.       fludrocortisone (FLORINEF) 0.1 mg Tab Take 1 tablet (100 mcg total) by mouth 2 (two) times daily. 60 tablet 11    gabapentin (NEURONTIN) 300 MG capsule Take 1 capsule (300 mg total) by mouth 3 (three) times daily. 270 capsule 0    inulin-sorbitol 2 gram Chew Take 2 tablets by mouth 2 (two) times daily.  0    ketorolac 0.5% (ACULAR) 0.5 % Drop instill one drop into both eyes every morning  3    lactulose (CHRONULAC) 20 gram/30 mL Soln Take 15 mLs (10 g total) by mouth 3 (three) times daily as needed (for constipation). 450 mL 1    levetiracetam (KEPPRA) 500 MG Tab Take 1 tablet (500 mg total) by mouth 2 (two) times daily. 60 tablet 5    midodrine (PROAMATINE) 5 MG Tab Take 2 tablets (10 mg total) by mouth every Mon, Wed, Fri, Sun. 32 tablet 11    midodrine (PROAMATINE) 5 MG Tab Take 3 tablets (15 mg total) by mouth every Tues, Thurs, Sat. Take 2 tablets (10 mg total) by mouth on non dialysis days.      omeprazole (PRILOSEC) 20 MG capsule Take 1 capsule (20 mg total) by mouth once daily. 30 capsule 5    oxycodone-acetaminophen (PERCOCET) 7.5-325 mg per  tablet Take 1 tablet by mouth every 4 (four) hours as needed for Pain.      polyethylene glycol (GLYCOLAX) 17 gram/dose powder Take 17 g by mouth once daily. Dissolve in juice. 510 g 0    prednisoLONE acetate (PRED FORTE) 1 % DrpS INSTILL ONE DROP INTO  LEFT EYE THREE TIMES A DAY  3    predniSONE (DELTASONE) 10 MG tablet Takes one 10 mg tablet every morning (Patient taking differently: Takes one 10 mg tablet every morning with breakfast) 60 tablet 3    RENVELA 800 mg Tab TAKE 1 TABLET BY MOUTH THREE TIMES DAILY WITH MEALS 90 tablet 0    warfarin (COUMADIN) 5 MG tablet TAKE 1 TABLET BY MOUTH EVERYDAY EXCEPT 1 AND 1/2 TABLETS ON MONDAY AND FRIDAY OR AS DIRECTED BY CLINIC (Patient taking differently: 1 tablet daily) 189 tablet 0     No facility-administered encounter medications on file as of 3/6/2017.      Orders Placed This Encounter   Procedures    Spirometry without Bronchodilator     Standing Status:   Future     Standing Expiration Date:   11/5/2017    DLCO-Carbon Monoxide Diffusing Capacity     Standing Status:   Future     Standing Expiration Date:   11/5/2017     Plan:     Continue present respiratory medications including O2 at 2-4 LPM (roles reviewed at today's visit).  Discussed the early role for antibiotics for treatment of respiratory infection.  Return visit 4 months with Spirometry and DLCO.

## 2017-03-08 ENCOUNTER — ANTI-COAG VISIT (OUTPATIENT)
Dept: CARDIOLOGY | Facility: CLINIC | Age: 57
End: 2017-03-08
Payer: MEDICARE

## 2017-03-08 ENCOUNTER — OFFICE VISIT (OUTPATIENT)
Dept: ENDOCRINOLOGY | Facility: CLINIC | Age: 57
End: 2017-03-08
Payer: MEDICARE

## 2017-03-08 VITALS
HEIGHT: 68 IN | BODY MASS INDEX: 40.09 KG/M2 | HEART RATE: 86 BPM | WEIGHT: 264.56 LBS | SYSTOLIC BLOOD PRESSURE: 96 MMHG | DIASTOLIC BLOOD PRESSURE: 64 MMHG

## 2017-03-08 DIAGNOSIS — I48.0 PAROXYSMAL ATRIAL FIBRILLATION: ICD-10-CM

## 2017-03-08 DIAGNOSIS — Z79.52 CURRENT CHRONIC USE OF SYSTEMIC STEROIDS: Chronic | ICD-10-CM

## 2017-03-08 DIAGNOSIS — E66.01 MORBID OBESITY WITH BMI OF 40.0-44.9, ADULT: Chronic | ICD-10-CM

## 2017-03-08 DIAGNOSIS — E78.5 HYPERLIPIDEMIA, UNSPECIFIED HYPERLIPIDEMIA TYPE: Chronic | ICD-10-CM

## 2017-03-08 DIAGNOSIS — Z79.01 LONG-TERM (CURRENT) USE OF ANTICOAGULANTS: Primary | ICD-10-CM

## 2017-03-08 DIAGNOSIS — E55.9 VITAMIN D INSUFFICIENCY: ICD-10-CM

## 2017-03-08 DIAGNOSIS — N25.81 SECONDARY HYPERPARATHYROIDISM: ICD-10-CM

## 2017-03-08 DIAGNOSIS — I95.9 HYPOTENSION, UNSPECIFIED HYPOTENSION TYPE: ICD-10-CM

## 2017-03-08 DIAGNOSIS — E27.49 SECONDARY ADRENAL INSUFFICIENCY: Primary | ICD-10-CM

## 2017-03-08 LAB — INR PPP: 4.6 (ref 2–3)

## 2017-03-08 PROCEDURE — 1160F RVW MEDS BY RX/DR IN RCRD: CPT | Mod: GC,S$GLB,, | Performed by: INTERNAL MEDICINE

## 2017-03-08 PROCEDURE — 99211 OFF/OP EST MAY X REQ PHY/QHP: CPT | Mod: 25,S$GLB,,

## 2017-03-08 PROCEDURE — 99999 PR PBB SHADOW E&M-EST. PATIENT-LVL IV: CPT | Mod: PBBFAC,GC,, | Performed by: INTERNAL MEDICINE

## 2017-03-08 PROCEDURE — 99214 OFFICE O/P EST MOD 30 MIN: CPT | Mod: GC,S$GLB,, | Performed by: INTERNAL MEDICINE

## 2017-03-08 PROCEDURE — 85610 PROTHROMBIN TIME: CPT | Mod: QW,S$GLB,,

## 2017-03-08 RX ORDER — VIT C/E/ZN/COPPR/LUTEIN/ZEAXAN 250MG-90MG
1000 CAPSULE ORAL DAILY
Refills: 0
Start: 2017-03-08 | End: 2019-01-01

## 2017-03-08 NOTE — PROGRESS NOTES
Subjective:       Patient ID: Sisi Medel is a 56 y.o. female.    Chief Complaint: adrenal insufficency    HPI Comments: Ms. Sisi Medel is presenting as a new patient to endocrine clinic.  Previously seen by endocrine in hospital in Feb 2017 with concern for adrenal insufficiency.  Medical history includes sarcoidosis, atrial fib, seizures.    Currently on prednisone 10mg. Has been on steroids for at least 7 years per pulmonary, sees Dr. Harvey.  Endocrine had seen patient in early February when had been hosptialized for clotted av graft    Again hospitalized for hypotension from feb 17-19 for hypotension. Had increased midodrine to 10mg on non dialysis days and continued midodrine 15mg dialysis days.  Reports some occasional dizziness since last hospilization. Fall about 10 days at home with bruising to right leg.  Currently on florinef 0.2mg daily, had been off for some time in January. States that bp was low when off fludrocortisone and had been symptomatic.    Denies fractures, no family history of osteoporotic fractures. Menopause at age 53.    Vit d insufficiency currently on vit d 1000 IU daily    Hyperlipidemia currently on lipitor 80mg. ldl at goal from 5/2016    Morbid obesity BMI 40. Plans to restart at gym.    Used to work as manager at [employer]    Review of Systems   Constitutional: Negative for unexpected weight change.   Eyes: Negative for visual disturbance.   Respiratory: Positive for shortness of breath.    Cardiovascular: Negative for chest pain.   Gastrointestinal: Negative for abdominal pain.   Musculoskeletal: Negative for arthralgias.   Skin: Negative for wound.   Neurological: Positive for dizziness. Negative for headaches.   Hematological: Does not bruise/bleed easily.   Psychiatric/Behavioral: Negative for sleep disturbance.       Objective:      Physical Exam   Constitutional: She appears well-developed and well-nourished.   HENT:   Head: Normocephalic and  atraumatic.   On oxygen   Eyes: EOM are normal.   Neck: Neck supple. No thyromegaly present.   Cardiovascular: Normal rate.    No murmur heard.  Pulmonary/Chest: Effort normal.   Abdominal: Soft.   Musculoskeletal: Normal range of motion.   Neurological: She is alert.   Skin: Skin is warm and dry.   Right leg bruising   Psychiatric: She has a normal mood and affect.   Vitals reviewed.      Assessment:       1. Secondary adrenal insufficiency    2. Morbid obesity with BMI of 40.0-44.9, adult    3. Hyperlipidemia, unspecified hyperlipidemia type    4. Vitamin D insufficiency    5. Hypotension, unspecified hypotension type    6. Current chronic use of systemic steroids    7. Secondary hyperparathyroidism        Plan:       1. Suspect secondary AI given long-term use of steroids. Continue prednisone 10mg per pulmonary. Emphasized that if she becomes acutely ill with fever should double steroid dose for several days.  With suspicion for secondary AI, would not require florinef. However, likely benefiting from florinef given chronic hypotension. Florinef currently prescribed per nephrology.  2. Recommend weight loss, diet. Plans to restart at gym.  3. LDL at goal on lipitor 80mg  4. Continue vit d 1000 IU daily  5. Continue midodrine and florinef as managed by nephrology  6. Patient would like DXA, although expressed limited osteoporosis treatment options given ESRD  7. Managed per nephrology. Currently on phos binder and 25 Vit D    Follow-up PRN or if plans to taper steroids off    Discussed with Dr. Miley Broussard MD    I, Donna Trent MD,  have personally taken the history and examined the patient and agree with the resident's note as stated above.    Suspect sec AI- can help taper off steroids once lung disease stable but would use HC and this would have much less antiinflammatory impact than current regimen     rtc if weaning help needed

## 2017-03-08 NOTE — MR AVS SNAPSHOT
[facility] - Endo/Diab/Metab  [address]  [facility] [address]  Phone: [phone]  Fax: [phone]                  Sisi Medel   3/8/2017 9:00 AM   Office Visit    Description:  Female : 1960   Provider:  Cirilo Broussard MD   Department:  [facility] - Endo/Diab/Metab           Reason for Visit     adrenal insufficency           Diagnoses this Visit        Comments    Secondary adrenal insufficiency    -  Primary     Morbid obesity with BMI of 40.0-44.9, adult         Hyperlipidemia, unspecified hyperlipidemia type         Vitamin D insufficiency         Hypotension, unspecified hypotension type         Current chronic use of systemic steroids         Secondary hyperparathyroidism                To Do List           Future Appointments        Provider Department Dept Phone    3/15/2017 8:15 AM Lesvia Brand, PharmD [facility] Coumadin [phone]    3/27/2017 11:20 AM Carlos A Caputo MD [facility] Internal Medicine [phone]    2017 1:40 PM [facility], DEXA1 [facility]-Bone Mineral Density [phone]    2017 2:20 PM Silas Harrell III, MD [facility] - Neurology [phone]    2017 8:00 AM Carlos A Caputo MD [facility] Internal Medicine [phone]      Goals (5 Years of Data)     None      Follow-Up and Disposition     Follow-up and Disposition History       These Medications        Disp Refills Start End    cholecalciferol, vitamin D3, 1,000 unit capsule  0 3/8/2017     Take 1 capsule (1,000 Units total) by mouth once daily. - Oral    Pharmacy: [facility] [address] [facility] [address] [place], [place] [address]FEMI CALDERON AT [facility] Ph #: [phone]         [facility]     [facility] Nurse Care [facility] -  Assistance  Registered nurses in the [facility] provide clinical advisement, health education, appointment booking, and other advisory services.  Call for this free service at [phone].             Medications            Message regarding Medications     Verify the changes and/or additions to your medication regime listed below are the same as discussed with your clinician today.  If any of these changes or additions are incorrect, please notify your healthcare provider.        START taking these NEW medications        Refills    cholecalciferol, vitamin D3, 1,000 unit capsule 0    Sig: Take 1 capsule (1,000 Units total) by mouth once daily.    Class: No Print    Route: Oral      STOP taking these medications     cholecalciferol, vitamin D3, 10,000 unit Tab Take 1 tablet by mouth once daily.    oxycodone-acetaminophen (PERCOCET) 7.5-325 mg per tablet Take 1 tablet by mouth every 4 (four) hours as needed for Pain.           Verify that the below list of medications is an accurate representation of the medications you are currently taking.  If none reported, the list may be blank. If incorrect, please contact your healthcare provider. Carry this list with you in case of emergency.           Current Medications     ACZONE 5 % topical gel Apply to face every morning    ammonium lactate (LAC-HYDRIN) 12 % lotion Apply topically as needed (to legs).     atorvastatin (LIPITOR) 80 MG tablet Take 1 tablet (80 mg total) by mouth nightly.    cholecalciferol, vitamin D3, 1,000 unit capsule Take 1 capsule (1,000 Units total) by mouth once daily.    clopidogrel (PLAVIX) 75 mg tablet Take 1 tablet (75 mg total) by mouth once daily.    diclofenac sodium 1 % Gel Apply 2 g topically every 8 (eight) hours as needed.    docusate sodium (COLACE) 100 MG capsule Take 100 mg by mouth 2 (two) times daily as needed for Constipation.    econazole nitrate 1 % cream Apply topically 2 (two) times daily.     fludrocortisone (FLORINEF) 0.1 mg Tab Take 1 tablet (100 mcg total) by mouth 2 (two) times daily.    gabapentin (NEURONTIN) 300 MG capsule Take 1 capsule (300 mg total) by mouth 3 (three) times daily.    inulin-sorbitol 2 gram Chew Take 2 tablets by  "mouth 2 (two) times daily.    ketorolac 0.5% (ACULAR) 0.5 % Drop instill one drop into both eyes every morning    lactulose (CHRONULAC) 20 gram/30 mL Soln Take 15 mLs (10 g total) by mouth 3 (three) times daily as needed (for constipation).    levetiracetam (KEPPRA) 500 MG Tab Take 1 tablet (500 mg total) by mouth 2 (two) times daily.    midodrine (PROAMATINE) 5 MG Tab Take 2 tablets (10 mg total) by mouth every Mon, Wed, Fri, Sun.    midodrine (PROAMATINE) 5 MG Tab Take 3 tablets (15 mg total) by mouth every Tues, Thurs, Sat. Take 2 tablets (10 mg total) by mouth on non dialysis days.    omeprazole (PRILOSEC) 20 MG capsule Take 1 capsule (20 mg total) by mouth once daily.    polyethylene glycol (GLYCOLAX) 17 gram/dose powder Take 17 g by mouth once daily. Dissolve in juice.    prednisoLONE acetate (PRED FORTE) 1 % DrpS INSTILL ONE DROP INTO  LEFT EYE THREE TIMES A DAY    predniSONE (DELTASONE) 10 MG tablet Takes one 10 mg tablet every morning    RENVELA 800 mg Tab TAKE 1 TABLET BY MOUTH THREE TIMES DAILY WITH MEALS    warfarin (COUMADIN) 5 MG tablet TAKE 1 TABLET BY MOUTH EVERYDAY EXCEPT 1 AND 1/2 TABLETS ON MONDAY AND FRIDAY OR AS DIRECTED BY CLINIC           Clinical Reference Information           Your Vitals Were     BP Pulse Height Weight Last Period BMI    96/64 86 5' 8" (1.727 m) 120 kg (264 lb 8.8 oz) (LMP Unknown) 40.22 kg/m2      Blood Pressure          Most Recent Value    BP  96/64      Allergies as of 3/8/2017     Bactrim [Sulfamethoxazole-trimethoprim]    Nsaids (Non-steroidal Anti-inflammatory Drug)      Immunizations Administered on Date of Encounter - 3/8/2017     None      Orders Placed During Today's Visit     Future Labs/Procedures Expected by Expires    DXA Bone Density Spine And Hip_Axial Skeleton  3/8/2017 3/8/2018      Maintenance Dialysis History     Start End Type Comments Center    2/17/2016  [facility]            Current D[facility] Center Information     [facility] [address] Phone #:  [phone]    Contact:  [place]  [address] Fax #:  [phone]            Language Assistance Services     ATTENTION: Language assistance services are available, free of charge. Please call [phone].      ATENCIÓN: Si habla denisa, tiene a muniz disposición servicios gratuitos de asistencia lingüística. Llame al [phone].     CHÚ Ý: N?u b?n nói Ti?ng Vi?t, có các d?ch v? h? tr? ngôn ng? mi?n phí dành cho b?n. G?i s? [phone].         [facility]/Di[facility]/Met[facility] complies with applicable Federal civil rights laws and does not discriminate on the basis of race, color, national origin, age, disability, or sex.

## 2017-03-08 NOTE — PROGRESS NOTES
Patient's INR has continued on an upward trend causing her INR today. She reports no changes to warrant this INR and has bruising on her legs from accidentally hitting herself. She stated the bruising has occurred since starting warfarin and isn't worse than her normal bruising. She will hold warfarin tonight and begin a reduced weekly dose with close monitoring. I advised her to contact us with any changes or problems.

## 2017-03-15 ENCOUNTER — ANTI-COAG VISIT (OUTPATIENT)
Dept: CARDIOLOGY | Facility: CLINIC | Age: 57
End: 2017-03-15
Payer: MEDICARE

## 2017-03-15 DIAGNOSIS — I48.0 PAROXYSMAL ATRIAL FIBRILLATION: ICD-10-CM

## 2017-03-15 DIAGNOSIS — Z79.01 LONG-TERM (CURRENT) USE OF ANTICOAGULANTS: Primary | ICD-10-CM

## 2017-03-15 LAB — INR PPP: 3.4 (ref 2–3)

## 2017-03-15 PROCEDURE — 99211 OFF/OP EST MAY X REQ PHY/QHP: CPT | Mod: 25,S$GLB,,

## 2017-03-15 PROCEDURE — 85610 PROTHROMBIN TIME: CPT | Mod: QW,S$GLB,,

## 2017-03-15 NOTE — PROGRESS NOTES
INR significantly improved but remains elevated without overt complication.  Patient reports eating broccoli 2 weeks ago but denies greens since then.  She originally said she would not be consistent with greens but then stated she was planning to cook rommel or mustard greens this Friday.  As patient likes greens, she will try to be consistent with intake and incorporate once/week.  INR previously therapeutic on warfarin 35 mg/week with greens, thus, will lower then rechallenge recently adjusted regimen.  Due to patient's schedule, will repeat INR 3/27 with other appointment vs. Next week.  Patient advised to contact clinic with any changes, questions, or concerns.

## 2017-03-17 DIAGNOSIS — G40.909 SEIZURE DISORDER: Chronic | ICD-10-CM

## 2017-03-17 RX ORDER — LEVETIRACETAM 500 MG/1
TABLET ORAL
Qty: 60 TABLET | Refills: 0 | Status: SHIPPED | OUTPATIENT
Start: 2017-03-17 | End: 2017-04-07 | Stop reason: SDUPTHER

## 2017-03-20 PROCEDURE — 99495 TRANSJ CARE MGMT MOD F2F 14D: CPT | Mod: S$GLB,,, | Performed by: NURSE PRACTITIONER

## 2017-03-21 RX ORDER — GABAPENTIN 100 MG/1
CAPSULE ORAL
Qty: 270 CAPSULE | Refills: 0 | OUTPATIENT
Start: 2017-03-21

## 2017-03-27 ENCOUNTER — LAB VISIT (OUTPATIENT)
Dept: LAB | Facility: HOSPITAL | Age: 57
End: 2017-03-27
Attending: INTERNAL MEDICINE
Payer: MEDICARE

## 2017-03-27 ENCOUNTER — ANTI-COAG VISIT (OUTPATIENT)
Dept: CARDIOLOGY | Facility: CLINIC | Age: 57
End: 2017-03-27
Payer: MEDICARE

## 2017-03-27 ENCOUNTER — OFFICE VISIT (OUTPATIENT)
Dept: INTERNAL MEDICINE | Facility: CLINIC | Age: 57
End: 2017-03-27
Payer: MEDICARE

## 2017-03-27 VITALS
WEIGHT: 264.56 LBS | HEART RATE: 71 BPM | DIASTOLIC BLOOD PRESSURE: 70 MMHG | SYSTOLIC BLOOD PRESSURE: 125 MMHG | BODY MASS INDEX: 40.09 KG/M2 | OXYGEN SATURATION: 99 % | TEMPERATURE: 97 F | HEIGHT: 68 IN

## 2017-03-27 DIAGNOSIS — I48.0 PAROXYSMAL ATRIAL FIBRILLATION: ICD-10-CM

## 2017-03-27 DIAGNOSIS — E05.90 SUBCLINICAL HYPERTHYROIDISM: ICD-10-CM

## 2017-03-27 DIAGNOSIS — Z79.01 LONG-TERM (CURRENT) USE OF ANTICOAGULANTS: Primary | ICD-10-CM

## 2017-03-27 DIAGNOSIS — R68.89 HEAT INTOLERANCE: Primary | ICD-10-CM

## 2017-03-27 LAB
INR PPP: 4.7 (ref 2–3)
T3 SERPL-MCNC: 63 NG/DL
T4 FREE SERPL-MCNC: 0.96 NG/DL
TSH SERPL DL<=0.005 MIU/L-ACNC: 0.56 UIU/ML

## 2017-03-27 PROCEDURE — 85610 PROTHROMBIN TIME: CPT | Mod: QW,S$GLB,, | Performed by: PHARMACIST

## 2017-03-27 PROCEDURE — 36415 COLL VENOUS BLD VENIPUNCTURE: CPT

## 2017-03-27 PROCEDURE — 83520 IMMUNOASSAY QUANT NOS NONAB: CPT

## 2017-03-27 PROCEDURE — 99211 OFF/OP EST MAY X REQ PHY/QHP: CPT | Mod: 25,S$GLB,, | Performed by: PHARMACIST

## 2017-03-27 PROCEDURE — 99999 PR PBB SHADOW E&M-EST. PATIENT-LVL III: CPT | Mod: PBBFAC,,, | Performed by: INTERNAL MEDICINE

## 2017-03-27 PROCEDURE — 1160F RVW MEDS BY RX/DR IN RCRD: CPT | Mod: S$GLB,,, | Performed by: INTERNAL MEDICINE

## 2017-03-27 PROCEDURE — 99214 OFFICE O/P EST MOD 30 MIN: CPT | Mod: S$GLB,,, | Performed by: INTERNAL MEDICINE

## 2017-03-27 PROCEDURE — 99499 UNLISTED E&M SERVICE: CPT | Mod: S$GLB,,, | Performed by: INTERNAL MEDICINE

## 2017-03-27 PROCEDURE — 84439 ASSAY OF FREE THYROXINE: CPT

## 2017-03-27 PROCEDURE — 84443 ASSAY THYROID STIM HORMONE: CPT

## 2017-03-27 PROCEDURE — 84480 ASSAY TRIIODOTHYRONINE (T3): CPT

## 2017-03-27 NOTE — PROGRESS NOTES
"Patient reports she is very tired today. INR elevated. She reports she did not have greens since her last INR assessment and does not care to worry about keeping her diet consistent. I attempetd to explain the importance of consistency in her diet, whether it includes greens or not, but she told me she was not interested in hearing this and just wanted her dose adjustment and to be on her way. I again attempted to speak to patient and she repeated loudly that she just wanted her yellow card and to be on her way. "This note will not be shared with the patient."  "

## 2017-03-27 NOTE — PROGRESS NOTES
Subjective:       Patient ID: Sisi Medel is a 56 y.o. female.    Chief Complaint: Hospital Follow Up and Medication Problem    HPI  55 y/o woman with h/o sarcoidosis, chronic combined CHF (most recent EF 40-45%), PAF, HTN, ESRD on HD MWF, obesity, YOANDY, chronic respiratory failure here for follow-up.     Scheduled as hospital follow-up but recently seen in priority clinic.     Hot flashes / feeling hot all the time started about 2 weeks ago, has AC at home set to 68F. Sweating more than usual. Fatigued more than usual. No changes in BM - no diarrhea.   Having palpitations intermittently (does have h/o atrial fibrillation, but this has been worse than usual in past 2 weeks) including episode of palpitations and shortness of breath 2 nights ago. Lasted <1 hour.   Last menses ~3 years ago, didn't have any significant hot flashes.   Saw Endocrine 3/8/17 for secondary adrenal insufficiency (on prednisone long-term), now on florinef again (had been on this previously from her nephrologist, then this was restarted in February during her hospitalization.     Taking midodrine as prescribed (10mg on non-HD days, 15mg on HD days).   Metoprolol discontinued due to recurrent symptomatic hypotension, despite her atrial fibrillation.  She is on coumadin and having difficulty following a diet consistent in vitamin K-rich foods -- she very much wants to stop this and switch to another medication if at all possible.     Saw Dr Harvey recently for follow up on chronic sarcoidosis with chronic hypoxia. On continuous home O2 2-4LPM.     Review of Systems  as noted    Past medical history, surgical history, and family medical history reviewed and updated as appropriate.    Medications and allergies reviewed.     Objective:          Vitals:    03/27/17 1131   BP: 125/70   BP Location: Right arm   Patient Position: Sitting   Pulse: 71   Temp: 97.4 °F (36.3 °C)   TempSrc: Oral   SpO2: 99%   Weight: 120 kg (264 lb 8.8 oz)   Height:  "5' 8" (1.727 m)     Body mass index is 40.22 kg/(m^2).  Physical Exam   Constitutional: She is oriented to person, place, and time. She appears well-developed and well-nourished. No distress.   Pleasant obese woman in wheelchair, appears tired   HENT:   Head: Normocephalic and atraumatic.   Mouth/Throat: Oropharynx is clear and moist.   Eyes: Conjunctivae and EOM are normal. Pupils are equal, round, and reactive to light.   Neck: Normal range of motion. Neck supple. No JVD present. No thyromegaly (difficult exam due to habitus) present.   Cardiovascular: Normal rate.  An irregularly irregular rhythm present.   No murmur heard.  Pulmonary/Chest: Effort normal. No respiratory distress. She has no wheezes. She has no rales.   On portable O2   Abdominal: Soft. Bowel sounds are normal. She exhibits no distension. There is no tenderness.   Musculoskeletal: Normal range of motion. She exhibits edema (1+ edema at ankles). She exhibits no tenderness.   Lymphadenopathy:     She has no cervical adenopathy.   Neurological: She is alert and oriented to person, place, and time. She has normal strength. No cranial nerve deficit or sensory deficit.   Skin: Skin is warm and dry. She is not diaphoretic.   Psychiatric:   Anxious/frustrated with health issues   Vitals reviewed.      Lab Results   Component Value Date    WBC 15.42 (H) 02/19/2017    HGB 9.2 (L) 02/19/2017    HCT 29.9 (L) 02/19/2017     (H) 02/19/2017    CHOL 189 05/17/2016    TRIG 69 05/17/2016    HDL 85 (H) 05/17/2016    ALT 8 (L) 02/19/2017    AST 15 02/19/2017     (L) 02/19/2017    K 4.1 02/19/2017    CL 97 02/19/2017    CREATININE 6.0 (H) 02/19/2017    BUN 27 (H) 02/19/2017    CO2 29 02/19/2017    TSH 0.425 01/27/2017    INR 4.7 (A) 03/27/2017    HGBA1C 5.7 05/17/2016       Assessment:       1. Heat intolerance    2. Subclinical hyperthyroidism        Plan:   Sisi was seen today for hospital follow up and medication problem.    Diagnoses and all " orders for this visit:    Heat intolerance - will check TFTs and thyrotropin Ab, but otherwise unclear what may be causing this as a new symptom, given multiple active medical concerns.    Subclinical hyperthyroidism  -     THYROTROPIN RECEPTOR ANTIBODY; Future    Recommended she check in with her cardiologist about the coumadin and other possible approaches given her atrial fibrillation. She is aware of risk of stroke without anticoagulation.    Continue florinef, continue to follow with endocrine.    Continue prednisone, home O2.    Health maintenance reviewed with patient.     Return in about 5 weeks (around 4/28/2017) for follow up as scheduled.    Carlos A Caputo MD  Internal Medicine  [facility]  3/27/2017

## 2017-03-27 NOTE — PATIENT INSTRUCTIONS
[facility]  Nurse Care Line Available For 24/7 Assistance    This service is free and available by calling [phone] or [phone].    [facility] provides appointment booking, health education and advisory services 24 hours a day, seven days a week. Specially trained registered nurses are available to discuss your health care concerns, to help you decide if your symptoms require going to the emergency room for assessment or a visit to a physician and to recommend appropriate self-care techniques.        Discharge Instructions for Atrial Fibrillation  You have been diagnosed with an abnormal heart rhythm called atrial fibrillation. With this condition, your hearts 2 upper chambers quiver rather than squeeze the blood out in a normal pattern. This leads to an irregular and sometimes rapid heartbeat. Some people will develop associated symptoms such as a flip-flopping heartbeat, chest pain, lightheadedness, or shortness of breath. Other people may have no symptoms at all. Atrial fibrillation is serious because it affects the hearts ability to fill with blood as it should. Blood clots may form. This increases the risk for stroke. Untreated atrial fibrillation can also lead to heart failure. Atrial fibrillation can be controlled. With treatment, most people with atrial fibrillation lead normal lives.  Treatment options  Recommended treatment for atrial fibrillation depends on your age, symptoms, how long you have had atrial fibrillation, and other factors. You will have a complete evaluation to find out if you have any abnormalities that caused your heart to go into atrial fibrillation. This might be blocked heart arteries or a thyroid problem. Your doctor will assess your particular case and discuss choices with you.  Treatment choices may include:  · Treating an underlying disorder that puts you at risk for atrial fibrillation. For example, correcting an abnormal thyroid or electrolyte problem, or  treating a blocked heart artery.  · Restoring a normal heart rhythm with an electrical shock (cardioversion) or with an antiarrhythmic medicine (chemical cardioversion)  · Using medicine to control your heart rate in atrial fibrillation.  · Preventing the risk for blood clot and stroke using blood-thinning medicines. Your doctor will tell you what he or she recommends. Choices may include aspirin, clopidogrel, warfarin, dabigatran, rivaroxaban, apixaban, and edoxaban.  · Doing catheter ablation or a surgical maze procedure. These use different methods to destroy certain areas of heart tissue. This interrupts the electrical signals causing atrial fibrillation. One of these procedures may be a choice when medicines do not work, or as an alternative to long-term medicine.  · Other treatment choices may be recommended for you by your doctor.  Managing risk factors for stroke and preventing heart failure are important parts of any treatment plan for atrial fibrillation.  Home care  · Take your medicines exactly as directed. Dont skip doses.  · Work with your doctor to find the right medicines and doses for you.  · Learn to take your own pulse. Keep a record of your results. Ask your doctor which pulse rates mean that you need medical attention. Slowing your pulse is often the goal of treatment. Ask your doctor if its OK for you to use an automatic machine to check your pulse at home. Sometimes these machines dont count the pulse correctly when you have atrial fibrillation.  · Limit your intake of coffee, tea, cola, and other beverages with caffeine. Talk with your doctor about whether you should eliminate caffeine.  · Avoid over-the-counter medicines that have caffeine in them.  · Let your doctor know what medicines you take, including prescription and over-the-counter medicines, as well as any supplements. They interfere with some medicines given for atrial fibrillation.  · Ask your doctor about whether you can drink  alcohol. Some people need to avoid alcohol to better treat atrial fibrillation. If you are taking blood-thinner medicines, alcohol may interfere with them by increasing their effect.  · Never take stimulants such as amphetamines or cocaine. These drugs can speed up your heart rate and trigger atrial fibrillation.  Follow-up care  Follow up with your doctor, or as advised.     When should I call my healthcare provider  Call your healthcare provider right away if you have any of the following:  · Weakness  · Dizziness  · Fainting  · Fatigue  · Shortness of breath  · Chest pain with increased activity  · A change in the usual regularity of your heartbeat, or an unusually fast heartbeat   Date Last Reviewed: 4/23/2016  © 3449-1064 [employer]. [address] [place] [address], [place] [address]. All rights reserved. This information is not intended as a substitute for professional medical care. Always follow your healthcare professional's instructions.

## 2017-03-27 NOTE — MR AVS SNAPSHOT
[facility] - Internal Medicine  [address]  [facility] [address]  Phone: [phone]  Fax: [phone]                  Sisi Medel   3/27/2017 11:20 AM   Office Visit    Description:  Female : 1960   Provider:  Carlos A Caputo MD   Department:  [facility] - Internal Medicine           Reason for Visit     Hospital Follow Up     Medication Problem           Diagnoses this Visit        Comments    Heat intolerance    -  Primary     Subclinical hyperthyroidism                To Do List           Future Appointments        Provider Department Dept Phone    4/3/2017 9:15 AM Cristina Dunbar, PharmD [facility] - Coumadin [phone]    2017 1:40 PM [facility], DEXA1 [facility]-Bone [facility] [phone]    2017 2:20 PM Silas Harrell III, MD [facility] - Neurology [phone]    2017 8:00 AM Carlos A Caputo MD [facility] Internal Medicine [phone]    2017 12:30 PM EKG, APPT [facility] [phone]      Goals (5 Years of Data)     None      Follow-Up and Disposition     Return in about 5 weeks (around 2017) for follow up as scheduled.      [facility] On Call     [facility] On Call Nurse [facility] - 24/7 Assistance  Registered nurses in the [facility] On Call Center provide clinical advisement, health education, appointment booking, and other advisory services.  Call for this free service at [phone].             Medications           Message regarding Medications     Verify the changes and/or additions to your medication regime listed below are the same as discussed with your clinician today.  If any of these changes or additions are incorrect, please notify your healthcare provider.             Verify that the below list of medications is an accurate representation of the medications you are currently taking.  If none reported, the list may be blank. If incorrect, please contact your healthcare provider. Carry this list with you in case of emergency.            Current Medications     ACZONE 5 % topical gel Apply to face every morning    atorvastatin (LIPITOR) 80 MG tablet Take 1 tablet (80 mg total) by mouth nightly.    cholecalciferol, vitamin D3, 1,000 unit capsule Take 1 capsule (1,000 Units total) by mouth once daily.    clopidogrel (PLAVIX) 75 mg tablet Take 1 tablet (75 mg total) by mouth once daily.    docusate sodium (COLACE) 100 MG capsule Take 100 mg by mouth 2 (two) times daily as needed for Constipation.    gabapentin (NEURONTIN) 300 MG capsule Take 1 capsule (300 mg total) by mouth 3 (three) times daily.    ketorolac 0.5% (ACULAR) 0.5 % Drop instill one drop into both eyes every morning    lactulose (CHRONULAC) 20 gram/30 mL Soln Take 15 mLs (10 g total) by mouth 3 (three) times daily as needed (for constipation).    levetiracetam (KEPPRA) 500 MG Tab TAKE 1 TABLET BY MOUTH TWICE DAILY    midodrine (PROAMATINE) 5 MG Tab Take 2 tablets (10 mg total) by mouth every Mon, Wed, Fri, Sun.    midodrine (PROAMATINE) 5 MG Tab Take 3 tablets (15 mg total) by mouth every Tues, Thurs, Sat. Take 2 tablets (10 mg total) by mouth on non dialysis days.    polyethylene glycol (GLYCOLAX) 17 gram/dose powder Take 17 g by mouth once daily. Dissolve in juice.    prednisoLONE acetate (PRED FORTE) 1 % DrpS INSTILL ONE DROP INTO  LEFT EYE THREE TIMES A DAY    predniSONE (DELTASONE) 10 MG tablet Takes one 10 mg tablet every morning    RENVELA 800 mg Tab TAKE 1 TABLET BY MOUTH THREE TIMES DAILY WITH MEALS    warfarin (COUMADIN) 5 MG tablet TAKE 1 TABLET BY MOUTH EVERYDAY EXCEPT 1 AND 1/2 TABLETS ON MONDAY AND FRIDAY OR AS DIRECTED BY CLINIC    ammonium lactate (LAC-HYDRIN) 12 % lotion Apply topically as needed (to legs).     diclofenac sodium 1 % Gel Apply 2 g topically every 8 (eight) hours as needed.    econazole nitrate 1 % cream Apply topically 2 (two) times daily.     inulin-sorbitol 2 gram Chew Take 2 tablets by mouth 2 (two) times daily.    omeprazole (PRILOSEC) 20 MG capsule  "Take 1 capsule (20 mg total) by mouth once daily.           Clinical Reference Information           Your Vitals Were     BP Pulse Temp Height Weight Last Period    125/70 (BP Location: Right arm, Patient Position: Sitting) 71 97.4 °F (36.3 °C) (Oral) 5' 8" (1.727 m) 120 kg (264 lb 8.8 oz) (LMP Unknown)    SpO2 BMI             99% 40.22 kg/m2         Blood Pressure          Most Recent Value    BP  125/70      Allergies as of 3/27/2017     Bactrim [Sulfamethoxazole-trimethoprim]    Nsaids (Non-steroidal Anti-inflammatory Drug)      Immunizations Administered on Date of Encounter - 3/27/2017     None      Orders Placed During Today's Visit     Future Labs/Procedures Expected by Expires    THYROTROPIN RECEPTOR ANTIBODY  3/27/2017 5/26/2018      Maintenance Dialysis History     Start End Type Comments Center    2/17/2016  [facility]            Current Dialysis Center Information     [facility] [address] Phone #:  [phone]    Contact:  [place]  [address] Fax #:  [phone]            Instructions    [facility] ON CALL  Nurse Care Line Available For 24/7 Assistance    This service is free and available by calling [phone] or [phone].    [facility] provides appointment booking, health education and advisory services 24 hours a day, seven days a week. Specially trained registered nurses are available to discuss your health care concerns, to help you decide if your symptoms require going to the emergency room for assessment or a visit to a physician and to recommend appropriate self-care techniques.        Discharge Instructions for Atrial Fibrillation  You have been diagnosed with an abnormal heart rhythm called atrial fibrillation. With this condition, your hearts 2 upper chambers quiver rather than squeeze the blood out in a normal pattern. This leads to an irregular and sometimes rapid heartbeat. Some people will develop associated symptoms such as a flip-flopping " heartbeat, chest pain, lightheadedness, or shortness of breath. Other people may have no symptoms at all. Atrial fibrillation is serious because it affects the hearts ability to fill with blood as it should. Blood clots may form. This increases the risk for stroke. Untreated atrial fibrillation can also lead to heart failure. Atrial fibrillation can be controlled. With treatment, most people with atrial fibrillation lead normal lives.  Treatment options  Recommended treatment for atrial fibrillation depends on your age, symptoms, how long you have had atrial fibrillation, and other factors. You will have a complete evaluation to find out if you have any abnormalities that caused your heart to go into atrial fibrillation. This might be blocked heart arteries or a thyroid problem. Your doctor will assess your particular case and discuss choices with you.  Treatment choices may include:  · Treating an underlying disorder that puts you at risk for atrial fibrillation. For example, correcting an abnormal thyroid or electrolyte problem, or treating a blocked heart artery.  · Restoring a normal heart rhythm with an electrical shock (cardioversion) or with an antiarrhythmic medicine (chemical cardioversion)  · Using medicine to control your heart rate in atrial fibrillation.  · Preventing the risk for blood clot and stroke using blood-thinning medicines. Your doctor will tell you what he or she recommends. Choices may include aspirin, clopidogrel, warfarin, dabigatran, rivaroxaban, apixaban, and edoxaban.  · Doing catheter ablation or a surgical maze procedure. These use different methods to destroy certain areas of heart tissue. This interrupts the electrical signals causing atrial fibrillation. One of these procedures may be a choice when medicines do not work, or as an alternative to long-term medicine.  · Other treatment choices may be recommended for you by your doctor.  Managing risk factors for stroke and preventing  heart failure are important parts of any treatment plan for atrial fibrillation.  Home care  · Take your medicines exactly as directed. Dont skip doses.  · Work with your doctor to find the right medicines and doses for you.  · Learn to take your own pulse. Keep a record of your results. Ask your doctor which pulse rates mean that you need medical attention. Slowing your pulse is often the goal of treatment. Ask your doctor if its OK for you to use an automatic machine to check your pulse at home. Sometimes these machines dont count the pulse correctly when you have atrial fibrillation.  · Limit your intake of coffee, tea, cola, and other beverages with caffeine. Talk with your doctor about whether you should eliminate caffeine.  · Avoid over-the-counter medicines that have caffeine in them.  · Let your doctor know what medicines you take, including prescription and over-the-counter medicines, as well as any supplements. They interfere with some medicines given for atrial fibrillation.  · Ask your doctor about whether you can drink alcohol. Some people need to avoid alcohol to better treat atrial fibrillation. If you are taking blood-thinner medicines, alcohol may interfere with them by increasing their effect.  · Never take stimulants such as amphetamines or cocaine. These drugs can speed up your heart rate and trigger atrial fibrillation.  Follow-up care  Follow up with your doctor, or as advised.     When should I call my healthcare provider  Call your healthcare provider right away if you have any of the following:  · Weakness  · Dizziness  · Fainting  · Fatigue  · Shortness of breath  · Chest pain with increased activity  · A change in the usual regularity of your heartbeat, or an unusually fast heartbeat   Date Last Reviewed: 4/23/2016 © 2000-2016 [employer]. [address] [place] [address], [place] [address]. All rights reserved. This information is not intended as a substitute for professional  medical care. Always follow your healthcare professional's instructions.             Language Assistance Services     ATTENTION: Language assistance services are available, free of charge. Please call [phone].      ATENCIÓN: Si habtremayne crawley, tiene a muniz disposición servicios gratuitos de asistencia lingüística. Llame al [phone].     CHÚ Ý: N?u b?n nói Ti?ng Vi?t, có các d?ch v? h? tr? ngôn ng? mi?n phí dành cho b?n. G?i s? [phone].         [facility] complies with applicable Federal civil rights laws and does not discriminate on the basis of race, color, national origin, age, disability, or sex.

## 2017-03-29 DIAGNOSIS — G63 POLYNEUROPATHY ASSOCIATED WITH UNDERLYING DISEASE: ICD-10-CM

## 2017-03-29 LAB — TSH RECEP AB SER-ACNC: <1 IU/L

## 2017-03-29 RX ORDER — GABAPENTIN 300 MG/1
CAPSULE ORAL
Qty: 270 CAPSULE | Refills: 3 | Status: SHIPPED | OUTPATIENT
Start: 2017-03-29 | End: 2017-09-06

## 2017-04-01 DIAGNOSIS — I50.42 CHRONIC COMBINED SYSTOLIC AND DIASTOLIC HEART FAILURE: ICD-10-CM

## 2017-04-03 ENCOUNTER — ANTI-COAG VISIT (OUTPATIENT)
Dept: CARDIOLOGY | Facility: CLINIC | Age: 57
End: 2017-04-03
Payer: MEDICARE

## 2017-04-03 DIAGNOSIS — Z79.01 LONG-TERM (CURRENT) USE OF ANTICOAGULANTS: ICD-10-CM

## 2017-04-03 DIAGNOSIS — I48.0 PAROXYSMAL ATRIAL FIBRILLATION: ICD-10-CM

## 2017-04-03 LAB — INR PPP: 1.4 (ref 2–3)

## 2017-04-03 PROCEDURE — 99211 OFF/OP EST MAY X REQ PHY/QHP: CPT | Mod: 25,S$GLB,,

## 2017-04-03 PROCEDURE — 85610 PROTHROMBIN TIME: CPT | Mod: QW,S$GLB,,

## 2017-04-03 RX ORDER — WARFARIN SODIUM 5 MG/1
5 TABLET ORAL DAILY
Qty: 90 TABLET | Refills: 3 | Status: SHIPPED | OUTPATIENT
Start: 2017-04-03 | End: 2019-01-01 | Stop reason: SDUPTHER

## 2017-04-03 RX ORDER — ATORVASTATIN CALCIUM 80 MG/1
TABLET, FILM COATED ORAL
Qty: 90 TABLET | Refills: 3 | Status: SHIPPED | OUTPATIENT
Start: 2017-04-03 | End: 2017-11-30 | Stop reason: SDUPTHER

## 2017-04-03 NOTE — PROGRESS NOTES
Pt confirms dose as 5mg daily x 2.5mg thu, with held doses. States eating greens as advised. No changes in diet. INR trending down. Will adjust. Pt advised to continue consistency.

## 2017-04-07 ENCOUNTER — OFFICE VISIT (OUTPATIENT)
Dept: NEUROLOGY | Facility: CLINIC | Age: 57
End: 2017-04-07
Payer: MEDICARE

## 2017-04-07 ENCOUNTER — HOSPITAL ENCOUNTER (OUTPATIENT)
Dept: RADIOLOGY | Facility: CLINIC | Age: 57
Discharge: HOME | End: 2017-04-07
Attending: INTERNAL MEDICINE
Payer: MEDICARE

## 2017-04-07 ENCOUNTER — ANTI-COAG VISIT (OUTPATIENT)
Dept: CARDIOLOGY | Facility: CLINIC | Age: 57
End: 2017-04-07

## 2017-04-07 VITALS
SYSTOLIC BLOOD PRESSURE: 111 MMHG | WEIGHT: 266.31 LBS | HEART RATE: 78 BPM | DIASTOLIC BLOOD PRESSURE: 72 MMHG | BODY MASS INDEX: 40.36 KG/M2 | HEIGHT: 68 IN

## 2017-04-07 DIAGNOSIS — I48.0 PAROXYSMAL ATRIAL FIBRILLATION: ICD-10-CM

## 2017-04-07 DIAGNOSIS — N18.6 ESRD ON HEMODIALYSIS: Chronic | ICD-10-CM

## 2017-04-07 DIAGNOSIS — Z99.2 ESRD ON HEMODIALYSIS: Chronic | ICD-10-CM

## 2017-04-07 DIAGNOSIS — B02.29 PHN (POSTHERPETIC NEURALGIA): ICD-10-CM

## 2017-04-07 DIAGNOSIS — Z79.01 LONG-TERM (CURRENT) USE OF ANTICOAGULANTS: ICD-10-CM

## 2017-04-07 DIAGNOSIS — G40.909 SEIZURE DISORDER: Primary | Chronic | ICD-10-CM

## 2017-04-07 DIAGNOSIS — I50.42 CHRONIC COMBINED SYSTOLIC AND DIASTOLIC HEART FAILURE: ICD-10-CM

## 2017-04-07 DIAGNOSIS — D86.9 SARCOIDOSIS: ICD-10-CM

## 2017-04-07 DIAGNOSIS — E27.49 SECONDARY ADRENAL INSUFFICIENCY: ICD-10-CM

## 2017-04-07 DIAGNOSIS — G63 POLYNEUROPATHY ASSOCIATED WITH UNDERLYING DISEASE: ICD-10-CM

## 2017-04-07 DIAGNOSIS — Z79.52 CURRENT CHRONIC USE OF SYSTEMIC STEROIDS: Chronic | ICD-10-CM

## 2017-04-07 PROCEDURE — 99214 OFFICE O/P EST MOD 30 MIN: CPT | Mod: S$GLB,,,

## 2017-04-07 PROCEDURE — 1160F RVW MEDS BY RX/DR IN RCRD: CPT | Mod: S$GLB,,,

## 2017-04-07 PROCEDURE — 99499 UNLISTED E&M SERVICE: CPT | Mod: S$GLB,,,

## 2017-04-07 PROCEDURE — 77080 DXA BONE DENSITY AXIAL: CPT | Mod: 26,,, | Performed by: INTERNAL MEDICINE

## 2017-04-07 PROCEDURE — 99999 PR PBB SHADOW E&M-EST. PATIENT-LVL III: CPT | Mod: PBBFAC,,,

## 2017-04-07 RX ORDER — LEVETIRACETAM 500 MG/1
500 TABLET ORAL 2 TIMES DAILY
Qty: 60 TABLET | Refills: 5 | Status: SHIPPED | OUTPATIENT
Start: 2017-04-07 | End: 2017-06-19 | Stop reason: SDUPTHER

## 2017-04-07 RX ORDER — FLUDROCORTISONE ACETATE 0.1 MG/1
100 TABLET ORAL 2 TIMES DAILY
Status: ON HOLD | COMMUNITY
End: 2019-01-01 | Stop reason: SDUPTHER

## 2017-04-07 NOTE — LETTER
April 7, 2017      Carlos A Caputo MD  [address]  [place] [address]           [facility] Neu[facility]  [address]  [place] [address]  Phone: [phone]  Fax: [phone]          Patient: Sisi Medel   MR Number: [id number]   YOB: 1960   Date of Visit: 4/7/2017       Dear Dr. Carlos A Caputo:    Thank you for referring Sisi Medel to me for evaluation. Attached you will find relevant portions of my assessment and plan of care.    If you have questions, please do not hesitate to call me. I look forward to following Sisi Medel along with you.    Sincerely,    Silas Harrell III, MD    Enclosure  CC:  No Recipients    If you would like to receive this communication electronically, please contact [email or web address] or [phone] to request more information on [employer] Link access.    For providers and/or their staff who would like to refer a patient to [facility], please contact us through our one-stop-shop provider referral line, [facility], at [phone].    If you feel you have received this communication in error or would no longer like to receive these types of communications, please e-mail [email or web address]

## 2017-04-07 NOTE — MR AVS SNAPSHOT
[facility] - Neurology  [address]  [facility] [address]  Phone: [phone]  Fax: [phone]                  Sisi Medel   2017 2:20 PM   Office Visit    Description:  Female : 1960   Provider:  Silas Harrell III, MD   Department:  [facility] - Neurology           Reason for Visit     Follow-up           Diagnoses this Visit        Comments    Seizure disorder    -  Primary     Secondary pulmonary hypertension         PHN (postherpetic neuralgia)         Chronic combined systolic and diastolic heart failure         Sarcoidosis         Polyneuropathy associated with underlying disease         ESRD on hemodialysis                To Do List           Future Appointments        Provider Department Dept Phone    2017 8:00 AM LAB, APPOINTMENT [place] [facility] [phone]    2017 8:00 AM Carlos A Caputo MD [facility] Internal Medicine [phone]    2017 12:30 PM EKG, APPT [facility] [phone]    2017 1:00 PM PACEMAKER, ICD [facility] Arrhythmia [phone]    2017 1:40 PM Bladimir Adorno MD [facility] Arrhythmia [phone]      Goals (5 Years of Data)     None      Follow-Up and Disposition     Return in about 6 months (around 10/7/2017).    Follow-up and Disposition History       These Medications        Disp Refills Start End    levetiracetam (KEPPRA) 500 MG Tab 60 tablet 5 2017     Take 1 tablet (500 mg total) by mouth 2 (two) times daily. - Oral    Pharmacy: [id number] - [facility] [address] Ph #: [phone]         [facility]     [facility] Nurse Care Line -  Assistance  Unless otherwise directed by your provider, please contact [facility], our nurse care line that is available for  assistance.     Registered nurses in the [facility] Center provide: appointment scheduling, clinical advisement, health  education, and other advisory services.  Call: [phone] (toll free)               Medications           Message regarding Medications     Verify the changes and/or additions to your medication regime listed below are the same as discussed with your clinician today.  If any of these changes or additions are incorrect, please notify your healthcare provider.        CHANGE how you are taking these medications     Start Taking Instead of    levetiracetam (KEPPRA) 500 MG Tab levetiracetam (KEPPRA) 500 MG Tab    Dosage:  Take 1 tablet (500 mg total) by mouth 2 (two) times daily. Dosage:  TAKE 1 TABLET BY MOUTH TWICE DAILY    Reason for Change:  Reorder            Verify that the below list of medications is an accurate representation of the medications you are currently taking.  If none reported, the list may be blank. If incorrect, please contact your healthcare provider. Carry this list with you in case of emergency.           Current Medications     ACZONE 5 % topical gel Apply to face every morning    ammonium lactate (LAC-HYDRIN) 12 % lotion Apply topically as needed (to legs).     atorvastatin (LIPITOR) 80 MG tablet TAKE 1 TABLET(80 MG) BY MOUTH EVERY DAY    cholecalciferol, vitamin D3, 1,000 unit capsule Take 1 capsule (1,000 Units total) by mouth once daily.    docusate sodium (COLACE) 100 MG capsule Take 100 mg by mouth 2 (two) times daily as needed for Constipation.    econazole nitrate 1 % cream Apply topically 2 (two) times daily.     fludrocortisone (FLORINEF) 0.1 mg Tab Take 100 mcg by mouth 2 (two) times daily.    gabapentin (NEURONTIN) 300 MG capsule TAKE ONE CAPSULE BY MOUTH THREE TIMES DAILY    inulin-sorbitol 2 gram Chew Take 2 tablets by mouth 2 (two) times daily.    ketorolac 0.5% (ACULAR) 0.5 % Drop instill one drop into both eyes every morning    lactulose (CHRONULAC) 20 gram/30 mL Soln Take 15 mLs (10 g total) by mouth 3 (three) times daily as needed (for constipation).    levetiracetam  "(KEPPRA) 500 MG Tab Take 1 tablet (500 mg total) by mouth 2 (two) times daily.    midodrine (PROAMATINE) 5 MG Tab Take 2 tablets (10 mg total) by mouth every Mon, Wed, Fri, Sun.    midodrine (PROAMATINE) 5 MG Tab Take 3 tablets (15 mg total) by mouth every Tues, Thurs, Sat. Take 2 tablets (10 mg total) by mouth on non dialysis days.    omeprazole (PRILOSEC) 20 MG capsule Take 1 capsule (20 mg total) by mouth once daily.    polyethylene glycol (GLYCOLAX) 17 gram/dose powder Take 17 g by mouth once daily. Dissolve in juice.    prednisoLONE acetate (PRED FORTE) 1 % DrpS INSTILL ONE DROP INTO  LEFT EYE THREE TIMES A DAY    predniSONE (DELTASONE) 10 MG tablet Takes one 10 mg tablet every morning    RENVELA 800 mg Tab TAKE 1 TABLET BY MOUTH THREE TIMES DAILY WITH MEALS    warfarin (COUMADIN) 5 MG tablet Take 1 tablet (5 mg total) by mouth Daily. Per [facility]    clopidogrel (PLAVIX) 75 mg tablet Take 1 tablet (75 mg total) by mouth once daily.    diclofenac sodium 1 % Gel Apply 2 g topically every 8 (eight) hours as needed.           Clinical Reference Information           Your Vitals Were     BP Pulse Height Weight Last Period BMI    111/72 78 5' 8" (1.727 m) 120.8 kg (266 lb 5.1 oz) (LMP Unknown) 40.49 kg/m2      Blood Pressure          Most Recent Value    BP  111/72      Allergies as of 4/7/2017     Bactrim [Sulfamethoxazole-trimethoprim]    Nsaids (Non-steroidal Anti-inflammatory Drug)      Immunizations Administered on Date of Encounter - 4/7/2017     None      Maintenance Dialysis History     Start End Type Comments Center    2/17/2016  [facility]            Current Dialysis Center Information     [facility] [address] Phone #:  [phone]    Contact:  [place]  [address] Fax #:  [phone]            Language Assistance Services     ATTENTION: Language assistance services are available, free of charge. Please call [phone].      ATENCIÓN: Si aleksandar mathis " disposición servicios gratuitos de asistencia lingüística. Laurie natarajan [phone].     MALU Ý: N?u b?n nói Ti?ng Vi?t, có các d?ch v? h? tr? ngôn ng? mi?n phí dành cho b?n. G?i s? [phone].         [facility] complies with applicable Federal civil rights laws and does not discriminate on the basis of race, color, national origin, age, disability, or sex.

## 2017-04-07 NOTE — PROGRESS NOTES
This office note has been dictated.    SUBJECTIVE:  Sisi Medel returns for medical management of her seizure   disorder.  There have been no interval seizures.  I am prescribing Keppra 500 mg   twice a day.  She also has numbness and painful burning in the right foot   related to an episode of shingles some years back.  Gabapentin has been   prescribed for her, but has been ineffective.  Her situation is complicated by   multiple medical comorbidities including congestive heart failure, atrial   fibrillation, hypotension, pulmonary hypertension and end-stage renal disease,   on chronic hemodialysis.  She has suffered a fall fairly recently.  She got up   to use the bathroom, but was not using her walker.  It took a little bit of   assistance to get her back on her feet.  There was no significant injury or loss   of consciousness.    Continuing in the neurological review of systems, she has occasional chest pain,   which she attributes to her atrial fibrillation.  Shortness of breath only   comes on with exertion.  She is no longer making urine.  She denies fever,   chills, sweats, dizziness, tinnitus, hearing loss, headaches, vision problems,   speech or swallowing difficulty, nausea, vomiting, diarrhea and incontinence.    MEDICATIONS:  I reviewed her list of medications and edited the electronic   record.    SOCIAL HISTORY:  She is preparing for the Easter holiday.    PHYSICAL EXAMINATION:  GENERAL:  This is an alert, oriented and attentive lady.  Her speech is   appropriate and adequately articulated.  VITAL SIGNS:  Reviewed and also included in this note.  She is neatly dressed,   on nasal oxygen and in no acute distress.  NEUROLOGIC:  Cranial nerve examination reveals normal functioning of nerves II   through XII, which are serially examined.  Her neck is supple.  The pulses are   irregular.  No bruits are heard.  There is no focal motor weakness and she has   normal muscle tone.  Finger-to-nose and fine  motor movements are adequately and   equally performed.  She ambulates with a walker.  Pinprick is decreased in her   right foot, but other modalities appear to be intact.  The deep tendon reflexes   are symmetrical with the exception of the ankle jerks, which cannot be elicited.    Incidental note is made of hyperpigmented scarring on the right leg and foot   from her previous episode of shingles.    ASSESSMENT AND PLAN:  I reviewed the clinic record including her recent   laboratory on the computer console.  I discussed the situation with her in   detail.  Her seizures are controlled on the Keppra and the medication will be   continued.  The gabapentin is ineffective for the burning, numbness and pain in   her foot, so I am suggesting she obtain some capsaicin cream over-the-counter   and follow the package instructions closely.  She should apply at least three   times a day for best effect.  Her Keppra is being refilled today electronically   and her next scheduled appointment with me will be in six months.  She will also   follow up with her other treating physicians.      HENRY  dd: 04/07/2017 15:12:43 (CDT)  td: 04/08/2017 13:38:59 (CDT)  Doc ID   #[id number]  Job ID #[id number]    CC:

## 2017-04-08 ENCOUNTER — HOSPITAL ENCOUNTER (EMERGENCY)
Facility: HOSPITAL | Age: 57
Discharge: HOME | End: 2017-04-08
Attending: EMERGENCY MEDICINE | Admitting: EMERGENCY MEDICINE
Payer: MEDICARE

## 2017-04-08 VITALS
HEART RATE: 103 BPM | WEIGHT: 260.56 LBS | OXYGEN SATURATION: 99 % | SYSTOLIC BLOOD PRESSURE: 108 MMHG | TEMPERATURE: 98 F | RESPIRATION RATE: 18 BRPM | BODY MASS INDEX: 39.49 KG/M2 | DIASTOLIC BLOOD PRESSURE: 78 MMHG | HEIGHT: 68 IN

## 2017-04-08 DIAGNOSIS — N18.6 ESRD ON HEMODIALYSIS: Chronic | ICD-10-CM

## 2017-04-08 DIAGNOSIS — I48.92 ATRIAL FLUTTER, UNSPECIFIED TYPE: ICD-10-CM

## 2017-04-08 DIAGNOSIS — R07.9 CHEST PAIN, UNSPECIFIED TYPE: ICD-10-CM

## 2017-04-08 DIAGNOSIS — I48.0 PAROXYSMAL ATRIAL FIBRILLATION: Primary | Chronic | ICD-10-CM

## 2017-04-08 DIAGNOSIS — Z99.2 ESRD ON HEMODIALYSIS: Chronic | ICD-10-CM

## 2017-04-08 LAB
ALBUMIN SERPL BCP-MCNC: 3.4 G/DL
ALP SERPL-CCNC: 144 U/L
ALT SERPL W/O P-5'-P-CCNC: 19 U/L
ANION GAP SERPL CALC-SCNC: 14 MMOL/L
APTT BLDCRRT: 31.6 SEC
AST SERPL-CCNC: 22 U/L
BASOPHILS # BLD AUTO: 0.03 K/UL
BASOPHILS NFR BLD: 0.3 %
BILIRUB SERPL-MCNC: 0.5 MG/DL
BNP SERPL-MCNC: 668 PG/ML
BUN SERPL-MCNC: 19 MG/DL
CALCIUM SERPL-MCNC: 9.5 MG/DL
CHLORIDE SERPL-SCNC: 94 MMOL/L
CO2 SERPL-SCNC: 30 MMOL/L
CREAT SERPL-MCNC: 4.6 MG/DL
DIFFERENTIAL METHOD: ABNORMAL
EOSINOPHIL # BLD AUTO: 0.1 K/UL
EOSINOPHIL NFR BLD: 1 %
ERYTHROCYTE [DISTWIDTH] IN BLOOD BY AUTOMATED COUNT: 14.7 %
EST. GFR  (AFRICAN AMERICAN): 11.6 ML/MIN/1.73 M^2
EST. GFR  (NON AFRICAN AMERICAN): 10 ML/MIN/1.73 M^2
GLUCOSE SERPL-MCNC: 242 MG/DL
HCT VFR BLD AUTO: 43.2 %
HGB BLD-MCNC: 13.6 G/DL
INR PPP: 2.9
LYMPHOCYTES # BLD AUTO: 1.6 K/UL
LYMPHOCYTES NFR BLD: 16.1 %
MAGNESIUM SERPL-MCNC: 2.1 MG/DL
MCH RBC QN AUTO: 30.9 PG
MCHC RBC AUTO-ENTMCNC: 31.5 %
MCV RBC AUTO: 98 FL
MONOCYTES # BLD AUTO: 1.4 K/UL
MONOCYTES NFR BLD: 13.3 %
NEUTROPHILS # BLD AUTO: 7 K/UL
NEUTROPHILS NFR BLD: 68.8 %
PHOSPHATE SERPL-MCNC: 1.6 MG/DL
PLATELET # BLD AUTO: 276 K/UL
PMV BLD AUTO: 10.5 FL
POTASSIUM SERPL-SCNC: 3.6 MMOL/L
PROT SERPL-MCNC: 8.1 G/DL
PROTHROMBIN TIME: 28.8 SEC
RBC # BLD AUTO: 4.4 M/UL
SODIUM SERPL-SCNC: 138 MMOL/L
TROPONIN I SERPL DL<=0.01 NG/ML-MCNC: 0.12 NG/ML
TROPONIN I SERPL DL<=0.01 NG/ML-MCNC: 0.15 NG/ML
TSH SERPL DL<=0.005 MIU/L-ACNC: 1.44 UIU/ML
WBC # BLD AUTO: 10.19 K/UL

## 2017-04-08 PROCEDURE — 84443 ASSAY THYROID STIM HORMONE: CPT

## 2017-04-08 PROCEDURE — 83735 ASSAY OF MAGNESIUM: CPT

## 2017-04-08 PROCEDURE — 25000003 PHARM REV CODE 250: Performed by: EMERGENCY MEDICINE

## 2017-04-08 PROCEDURE — 84484 ASSAY OF TROPONIN QUANT: CPT | Mod: 91

## 2017-04-08 PROCEDURE — 99285 EMERGENCY DEPT VISIT HI MDM: CPT | Mod: 25

## 2017-04-08 PROCEDURE — 84484 ASSAY OF TROPONIN QUANT: CPT

## 2017-04-08 PROCEDURE — 85610 PROTHROMBIN TIME: CPT

## 2017-04-08 PROCEDURE — 80053 COMPREHEN METABOLIC PANEL: CPT

## 2017-04-08 PROCEDURE — 83880 ASSAY OF NATRIURETIC PEPTIDE: CPT

## 2017-04-08 PROCEDURE — 87040 BLOOD CULTURE FOR BACTERIA: CPT | Mod: 59

## 2017-04-08 PROCEDURE — 84100 ASSAY OF PHOSPHORUS: CPT

## 2017-04-08 PROCEDURE — 99284 EMERGENCY DEPT VISIT MOD MDM: CPT | Mod: ,,, | Performed by: EMERGENCY MEDICINE

## 2017-04-08 PROCEDURE — 94761 N-INVAS EAR/PLS OXIMETRY MLT: CPT

## 2017-04-08 PROCEDURE — 85025 COMPLETE CBC W/AUTO DIFF WBC: CPT

## 2017-04-08 PROCEDURE — 85730 THROMBOPLASTIN TIME PARTIAL: CPT

## 2017-04-08 RX ORDER — DIGOXIN 125 MCG
0.12 TABLET ORAL
Status: COMPLETED | OUTPATIENT
Start: 2017-04-08 | End: 2017-04-08

## 2017-04-08 RX ORDER — DIGOXIN 125 MCG
0.12 TABLET ORAL EVERY OTHER DAY
Qty: 15 TABLET | Refills: 1 | Status: SHIPPED | OUTPATIENT
Start: 2017-04-08 | End: 2017-06-19 | Stop reason: SDUPTHER

## 2017-04-08 RX ADMIN — DIGOXIN 0.12 MG: 125 TABLET ORAL at 07:04

## 2017-04-08 NOTE — ED PROVIDER NOTES
Encounter Date: 4/8/2017    SCRIBE #1 NOTE: I, Sara Hong, am scribing for, and in the presence of,  Dr. Mena. I have scribed the following portions of the note - the EKG reading and the Resident attestation. Other sections scribed: CXR.       History     Chief Complaint   Patient presents with    Chest Pain     Chest pain at dialysis     Review of patient's allergies indicates:   Allergen Reactions    Bactrim [sulfamethoxazole-trimethoprim] Other (See Comments)     Causes renal failure    Nsaids (non-steroidal anti-inflammatory drug) Other (See Comments)     Renal failure     HPI Comments: Patient is a 55 yo F with history of ESRD on HDm, HTN, YOANDY, and PAF with PPM and coumadin who presents with palpitations. Patient feels like her heart is racing. She has a known diagnosis of Afib but has not been on medications for it because it dropped her blood pressure too much. States she experience chest pain earlier today at dialysis but it stopped after an hour. Denies chest pain currently. Also reported some shortness of breath earlier which has since resolved. Denies fever, chills, cough, n/v/d/c. States she just wants to know what is going on with her heart.     The history is provided by the patient.     Past Medical History:   Diagnosis Date    Anemia in ESRD (end-stage renal disease) 3/7/2016    Anticoagulant long-term use     Cervical radiculopathy 7/20/2015    Chronic combined systolic and diastolic heart failure 6/14/2013    EF 20% 2013, improved with Medical therapy, negative PET 2013    Chronic respiratory failure with hypoxia 04/22/2013    On home oxygen at 2-5 liters    Closed fracture of proximal end of right fibula 6/27/2016    Complications due to renal dialysis device, implant, and graft     DDD (degenerative disc disease), lumbar 6/17/2015    ESRD on hemodialysis 2/23/2016    Essential hypertension     Gout     Lateral meniscal tear 5/31/2016    Lumbar stenosis 6/17/2015    Paroxysmal  atrial fibrillation 2014    Not on anticoagulation    Peripheral neuropathy 2013    Personal history of gastric ulcer 3/19/2013    Sarcoidosis, lung 2009    Diagnosed in  with ocular manifestation, on 4L home O2 and PO steroids    Secondary pulmonary hypertension 2015    Seizure disorder 3/19/2013    Shingles 2013    Thyroid disease      Past Surgical History:   Procedure Laterality Date    ABDOMINAL SURGERY       SECTION      x2    OTHER SURGICAL HISTORY      loop recorder implant    VASCULAR SURGERY       Family History   Problem Relation Age of Onset    Kidney failure Mother     Hypertension Mother     Diabetes Mother     Coronary artery disease Father     Hypertension Father     Diabetes Father     Lupus Sister     Diabetes Maternal Grandmother     Colon cancer Neg Hx     Ovarian cancer Neg Hx     Breast cancer Neg Hx     Melanoma Neg Hx      Social History   Substance Use Topics    Smoking status: Former Smoker     Packs/day: 0.50     Years: 10.00     Types: Cigarettes     Quit date: 1994    Smokeless tobacco: Never Used    Alcohol use No     Review of Systems   Constitutional: Negative for chills and fever.   HENT: Negative for congestion and sore throat.    Respiratory: Negative for cough and shortness of breath.    Cardiovascular: Positive for chest pain and palpitations.   Gastrointestinal: Negative for abdominal pain, constipation, diarrhea, nausea and vomiting.   Genitourinary: Negative for dysuria and hematuria.   Musculoskeletal: Negative for arthralgias and back pain.   Skin: Negative for rash and wound.   Neurological: Positive for headaches. Negative for dizziness.       Physical Exam   Initial Vitals   BP Pulse Resp Temp SpO2   17 1307 17 1307 17 1307 17 1307 17 1307   154/83 100 16 97.5 °F (36.4 °C) 99 %     Physical Exam    Vitals reviewed.  Constitutional: She appears well-developed and well-nourished.  She is not diaphoretic. No distress.   HENT:   Head: Normocephalic and atraumatic.   Eyes: EOM are normal. Pupils are equal, round, and reactive to light.   Neck: Normal range of motion. Neck supple.   Cardiovascular: Intact distal pulses. An irregularly irregular rhythm present. Tachycardia present.    Pulmonary/Chest: Breath sounds normal. No respiratory distress. She has no wheezes.   Abdominal: Soft. Bowel sounds are normal. She exhibits no distension. There is no tenderness.   Musculoskeletal: Normal range of motion.   Neurological: She is alert and oriented to person, place, and time. She has normal strength.   Skin: Skin is warm and dry.   Psychiatric: She has a normal mood and affect. Thought content normal.         ED Course   Procedures  Labs Reviewed   B-TYPE NATRIURETIC PEPTIDE - Abnormal; Notable for the following:        Result Value     (*)     All other components within normal limits   CBC W/ AUTO DIFFERENTIAL - Abnormal; Notable for the following:     MCHC 31.5 (*)     RDW 14.7 (*)     Mono # 1.4 (*)     Lymph% 16.1 (*)     All other components within normal limits   COMPREHENSIVE METABOLIC PANEL - Abnormal; Notable for the following:     Chloride 94 (*)     CO2 30 (*)     Glucose 242 (*)     Creatinine 4.6 (*)     Albumin 3.4 (*)     Alkaline Phosphatase 144 (*)     eGFR if  11.6 (*)     eGFR if non  10.0 (*)     All other components within normal limits   PHOSPHORUS - Abnormal; Notable for the following:     Phosphorus 1.6 (*)     All other components within normal limits   PROTIME-INR - Abnormal; Notable for the following:     Prothrombin Time 28.8 (*)     INR 2.9 (*)     All other components within normal limits   TROPONIN I - Abnormal; Notable for the following:     Troponin I 0.154 (*)     All other components within normal limits   TROPONIN I - Abnormal; Notable for the following:     Troponin I 0.117 (*)     All other components within normal limits    CULTURE, BLOOD    Narrative:     Aerobic and anaerobic   CULTURE, BLOOD    Narrative:     Aerobic and anaerobic   APTT   MAGNESIUM   TSH     EKG Readings: (Independently Interpreted)   Atrial flutter with ST segment flattening noted to lateral leads with a normal axis, occasional PVCs at 115 bpm       X-Rays:   Independently Interpreted Readings:   Chest X-Ray: Portable CXR: Moderately enlarged cardiac silhouette, pacemaker box and wiring intact, mildly increased bilateral lung markings.     Medical Decision Making:   History:   Old Medical Records: I decided to obtain old medical records.  Initial Assessment:   Patient with known Afib presenting with tachycardia.  Differential Diagnosis:   Ddx: Afib/flutter, sepsis, CHF  Independently Interpreted Test(s):   I have ordered and independently interpreted X-rays - see prior notes.  I have ordered and independently interpreted EKG Reading(s) - see prior notes  Clinical Tests:   Lab Tests: Ordered and Reviewed  Radiological Study: Ordered and Reviewed  Medical Tests: Ordered and Reviewed  Other:   I have discussed this case with another health care provider.       APC / Resident Notes:   6:38 PM  Cardiology consulted for atrial fibrillation. Will check repeat troponin. If no change in troponin, elevated likely due to ESRD.      7:01 PM  Will start patient on digoxin and discharge home. She will follow up with cardiology next week for RF ablation.        Scribe Attestation:   Scribe #1: I performed the above scribed service and the documentation accurately describes the services I performed. I attest to the accuracy of the note.    Attending Attestation:   Physician Attestation Statement for Resident:  As the supervising MD   Physician Attestation Statement: I have personally seen and examined this patient.   I agree with the above history. -: 56-year-old woman with history of paroxysmal A-fib, as well as ESRD/HD, presents from dialysis for evaluation of chest pain.     As the supervising MD I agree with the above PE.    As the supervising MD I agree with the above treatment, course, plan, and disposition.  I have reviewed and agree with the residents interpretation of the following: lab data, x-rays and EKG.          Physician Attestation for Scribe:  Physician Attestation Statement for Scribe #1: I, Dr. Mena, reviewed documentation, as scribed by Sara Hong in my presence, and it is both accurate and complete.         Attending ED Notes:   Patient has been urgently evaluated by cardiology on call for her persistent atrial flutter with chest pain.  Notably, the initial troponin exhibited elevation, however, a serial recheck reveals a diminishing serum troponin level.  These findings have been discussed with cardiology, and they have cleared her for discharge.  Outpatient management with digoxin has been initiated in the emergency department with 0.0125 mg every other day.  A prescription has been provided as well for continuous outpatient therapy.  She'll be discharged home in improved condition with instructions to maintain her follow-up appointment as scheduled with electrophysiology for planned ablation as an outpatient.          ED Course     Clinical Impression:   The primary encounter diagnosis was Paroxysmal atrial fibrillation. Diagnoses of Atrial flutter, unspecified type, Chest pain, unspecified type, and ESRD on hemodialysis were also pertinent to this visit.    Disposition:   Disposition: Discharged  Condition: Stable       Joanne Jenkins MD  Resident  04/08/17 [id number]       Edvin Mena MD  04/08/17 1935

## 2017-04-08 NOTE — DISCHARGE INSTRUCTIONS
Discharge Instructions for Atrial Fibrillation  You have been diagnosed with an abnormal heart rhythm called atrial fibrillation. With this condition, your hearts 2 upper chambers quiver rather than squeeze the blood out in a normal pattern. This leads to an irregular and sometimes rapid heartbeat. Some people will develop associated symptoms such as a flip-flopping heartbeat, chest pain, lightheadedness, or shortness of breath. Other people may have no symptoms at all. Atrial fibrillation is serious because it affects the hearts ability to fill with blood as it should. Blood clots may form. This increases the risk for stroke. Untreated atrial fibrillation can also lead to heart failure. Atrial fibrillation can be controlled. With treatment, most people with atrial fibrillation lead normal lives.  Treatment options  Recommended treatment for atrial fibrillation depends on your age, symptoms, how long you have had atrial fibrillation, and other factors. You will have a complete evaluation to find out if you have any abnormalities that caused your heart to go into atrial fibrillation. This might be blocked heart arteries or a thyroid problem. Your doctor will assess your particular case and discuss choices with you.  Treatment choices may include:  · Treating an underlying disorder that puts you at risk for atrial fibrillation. For example, correcting an abnormal thyroid or electrolyte problem, or treating a blocked heart artery.  · Restoring a normal heart rhythm with an electrical shock (cardioversion) or with an antiarrhythmic medicine (chemical cardioversion)  · Using medicine to control your heart rate in atrial fibrillation.  · Preventing the risk for blood clot and stroke using blood-thinning medicines. Your doctor will tell you what he or she recommends. Choices may include aspirin, clopidogrel, warfarin, dabigatran, rivaroxaban, apixaban, and edoxaban.  · Doing catheter ablation or a surgical maze  procedure. These use different methods to destroy certain areas of heart tissue. This interrupts the electrical signals causing atrial fibrillation. One of these procedures may be a choice when medicines do not work, or as an alternative to long-term medicine.  · Other treatment choices may be recommended for you by your doctor.  Managing risk factors for stroke and preventing heart failure are important parts of any treatment plan for atrial fibrillation.  Home care  · Take your medicines exactly as directed. Dont skip doses.  · Work with your doctor to find the right medicines and doses for you.  · Learn to take your own pulse. Keep a record of your results. Ask your doctor which pulse rates mean that you need medical attention. Slowing your pulse is often the goal of treatment. Ask your doctor if its OK for you to use an automatic machine to check your pulse at home. Sometimes these machines dont count the pulse correctly when you have atrial fibrillation.  · Limit your intake of coffee, tea, cola, and other beverages with caffeine. Talk with your doctor about whether you should eliminate caffeine.  · Avoid over-the-counter medicines that have caffeine in them.  · Let your doctor know what medicines you take, including prescription and over-the-counter medicines, as well as any supplements. They interfere with some medicines given for atrial fibrillation.  · Ask your doctor about whether you can drink alcohol. Some people need to avoid alcohol to better treat atrial fibrillation. If you are taking blood-thinner medicines, alcohol may interfere with them by increasing their effect.  · Never take stimulants such as amphetamines or cocaine. These drugs can speed up your heart rate and trigger atrial fibrillation.  Follow-up care  Follow up with your doctor, or as advised.     When should I call my healthcare provider  Call your healthcare provider right away if you have any of the  following:  · Weakness  · Dizziness  · Fainting  · Fatigue  · Shortness of breath  · Chest pain with increased activity  · A change in the usual regularity of your heartbeat, or an unusually fast heartbeat   Date Last Reviewed: 4/23/2016 © 2000-2016 [employer]. [address] [place] [address]. All rights reserved. This information is not intended as a substitute for professional medical care. Always follow your healthcare professional's instructions.          Having Catheter Ablation  A heart rhythm problem (arrhythmia) can make your heart beat too fast or in an irregular pattern. The problem is often caused by cells in your heart that arent working as they should. Or, it may be the result of an abnormal electric circuit. It may cause bothersome symptoms, such as an irregular heartbeat, dizziness, shortness of breath, chest pain, or fainting. Your doctor has recommended catheter ablation to treat your arrhythmia. This non-surgical procedure destroys the cells that are causing the problem.  Before the procedure    Before your catheter ablation, you will meet with the cardiac electrophysiologist (specially trained heart doctor) who will do the procedure. He or she will tell you how to get ready. You will likely be told to stop or change your heart rhythm medicines for a period of time before the procedure. Follow your doctors instructions. Also:  · Tell the doctor about all prescription and over-the-counter medicines you take. This includes herbs, supplements, and vitamins. It also includes daily medicines, such as insulin or blood thinners. If you are allergic to any medicines, tell the doctor.  · Have any routine tests, such as blood tests, as recommended.  · Dont eat or drink anything 12 hours before the procedure.  How catheter ablation is done  Catheter ablation uses thin, flexible wires (electrode catheters) to find and destroy (ablate) problem cells. Heres how the procedure is  done:  · The hearts signals are mapped. To find the problem, an electrophysiology study (EPS) is done. During this study, the doctor tries to start (induce) your arrhythmia. An electrical map of the heart is then created. This shows the type of arrhythmia you have and where the problem is. Using the map as a guide, the doctor knows where to ablate.  · Problem areas are destroyed using heat or cold therapy. Once the EPS shows where the problem is, an electrode catheter is thread through a blood vessel to that area in the heart. Energy is sent through the catheter to destroy the problem cells.  · The hearts rhythm is tested again. After ablating the problem cells, the doctor tries to restart (reinduce) your arrhythmia. If a fast rhythm cant be induced, the ablation is a success. But if a fast rhythm does start again, further ablation may be needed.  Your experience during catheter ablation  In most cases, catheter ablation is done in an electrophysiology (EP) lab. It often takes 2 to 4  hours, and sometimes longer. Youll receive medicine to prevent pain. Medicine will also help you relax or sleep during the procedure. If you feel uncomfortable during the procedure, tell the doctor or nurse:  · Getting started. First, skin on your groin (or rarely, the neck) is washed. Any hair in that area may be removed. This is where the catheters will be inserted. An IV (intravenous) line is started in your arm. Medicines and fluids are provided through this IV. To help keep the insertion site germ-free (sterile), your body is draped with sheets. Only the area where the catheters will be inserted is exposed.  · Inserting the catheters. The skin where the catheters will be inserted is numbed with a local anesthetic. This is so you wont feel pain. Then a small needle is used to make punctures in your vein or artery. Catheters are inserted through these punctures and guided to the heart with the help of X-ray monitors.  · Wires  are then placed in various places in the heart to map the electrical signals as well as to stimulate the heart.  · Finishing up. When the procedure is finished, the catheters are taken out of your body. Pressure is applied to the puncture sites to stop any bleeding. No stitches are needed. Youre then taken to a recovery room to rest. You'll need to remain lying down for a few hours. You'll be asked not to move the leg where the catheters were inserted for a few hours.   Risks and complications  The risks of catheter ablation are fairly low compared to the benefits you receive. Discuss these risks with your doctor before the procedure. Possible risks and complications include:  · Bleeding or bruising at the catheter insertion site  · Blood clots  · A slow heart rhythm (requiring a permanent pacemaker)  · Perforation of the heart muscle, blood vessel, or lung (may require an emergency procedure)  · Damage to a heart valve (rare)  · Stroke or heart attack, also known as acute myocardial infarction, or AMI (rare)  · Infection, which is a risk after any invasive procedure. This may be indicated by a fever of 100.4°F (38.0°C) or higher, drainage, or redness and pain at the catheter insertion site.  · Death (extremely rare)   Date Last Reviewed: 5/1/2016  © 0088-4760 [employer]. [address] [place] [address], [place] [address]. All rights reserved. This information is not intended as a substitute for professional medical care. Always follow your healthcare professional's instructions.

## 2017-04-08 NOTE — CONSULTS
Emergency Room  Cardiology Consults      SUBJECTIVE:     Chief Complaint/Reason for Consult: Afib    History of Present Illness:  Patient is a 56 y.o. female with hx of ESRD on TTHS, YOANDY, pulmonary Sarcoidosis on home O2 and chronic steroids, chronic adrenal insufficieny and hypotension, permanent Afib unable to tolerate rate controlling medications 2/2 hypotension and amio has been ineffective who is s/p [facility] BiV PPM on 1/2017 with plans for AVN RFA by Dr. Adorno here for episode of Afib RVR into 150's during HD. SBP in 100's. Associated with SOB and palpitations. Pt also notes she gets palps and SOB several times weekly with and w/o exertion. HR now 's Aifb w/o any medication. She is on coumadin. Pt reports she is now Asx and would like to go home. EKG Afib w/o ischemic changes. Trop at baseline 2/2 ESRD      (Not in a hospital admission)    Review of patient's allergies indicates:   Allergen Reactions    Bactrim [sulfamethoxazole-trimethoprim] Other (See Comments)     Causes renal failure    Nsaids (non-steroidal anti-inflammatory drug) Other (See Comments)     Renal failure        Past Medical History:   Diagnosis Date    Anemia in ESRD (end-stage renal disease) 3/7/2016    Anticoagulant long-term use     Cervical radiculopathy 7/20/2015    Chronic combined systolic and diastolic heart failure 6/14/2013    EF 20% 2013, improved with Medical therapy, negative PET 2013    Chronic respiratory failure with hypoxia 04/22/2013    On home oxygen at 2-5 liters    Closed fracture of proximal end of right fibula 6/27/2016    Complications due to renal dialysis device, implant, and graft     DDD (degenerative disc disease), lumbar 6/17/2015    ESRD on hemodialysis 2/23/2016    Essential hypertension     Gout     Lateral meniscal tear 5/31/2016    Lumbar stenosis 6/17/2015    Paroxysmal atrial fibrillation 5/16/2014    Not on anticoagulation    Peripheral neuropathy 11/13/2013    Personal history  of gastric ulcer 3/19/2013    Sarcoidosis, lung 2009    Diagnosed in  with ocular manifestation, on 4L home O2 and PO steroids    Secondary pulmonary hypertension 2015    Seizure disorder 3/19/2013    Shingles 2013    Thyroid disease      Past Surgical History:   Procedure Laterality Date    ABDOMINAL SURGERY       SECTION      x2    OTHER SURGICAL HISTORY      loop recorder implant    VASCULAR SURGERY       Family History   Problem Relation Age of Onset    Kidney failure Mother     Hypertension Mother     Diabetes Mother     Coronary artery disease Father     Hypertension Father     Diabetes Father     Lupus Sister     Diabetes Maternal Grandmother     Colon cancer Neg Hx     Ovarian cancer Neg Hx     Breast cancer Neg Hx     Melanoma Neg Hx      Social History   Substance Use Topics    Smoking status: Former Smoker     Packs/day: 0.50     Years: 10.00     Types: Cigarettes     Quit date: 1994    Smokeless tobacco: Never Used    Alcohol use No       Review of Systems:  Constitutional: no fever or chills  Eyes: no visual changes  ENT: no nasal congestion or sore throat  Respiratory: no cough or shortness of breath  Cardiovascular: no chest pain or palpitations  Gastrointestinal: no nausea or vomiting, no abdominal pain or change in bowel habits  Genitourinary: no hematuria or dysuria  Integument/Breast: no rash or pruritis  Hematologic/Lymphatic: no easy bruising or lymphadenopathy  Musculoskeletal: no arthralgias or myalgias  Neurological: no seizures or tremors  Endocrine: no heat or cold intolerance    OBJECTIVE:     Vital Signs (Most Recent)  Temp: 97.5 °F (36.4 °C) (17 1307)  Pulse: 101 (17 1630)  Resp: 15 (17 1630)  BP: 118/73 (17 1330)  SpO2: 100 % (17 1630)    Physical Exam:  General appearance: WDWN in NAD  Neck: no JVD  Lungs:  CTAB  Heart: irregular irregular rhythm , no m/r/g  Abdomen: SNTND, BS+  Extremities: no  c/c/e  Pulses: 2+ and symmetric  Neurologic: AOx3.         EKG:  Afib  w/o ischemic changes    ASSESSMENT/PLAN:     Afib with RVR  - Pt with complex hx and has received BiV PPM with plans for AVN RFA to control Afib given inability to tolerate alternative therapies and not a candidate for PVI given co morbidities  - Pt is now Asx and do not feel symptoms consistent with ACS but Afib RVR so would check another 3 hr troponin and if at baseline then OK to d/c home with digoxin 0.125mg every other day for rate control as she now has a PPM and side effects of dig toxicity and AV node blockade would be negated by pacing.  - Pt has f/u with Dr. Adorno scheduled for 4/28/17    Discussed with Dr. Adams in EP    aNtanael Geiger MD  Cardiology Fellow  [phone]

## 2017-04-08 NOTE — ED TRIAGE NOTES
"Pt here for "heart racing". Patient states this happened a few days ago but went away after sleeping. Pt states today she had CP 10/10 around 1000 during dialysis (Tues, Thurs, Sat dialysis, today got 3 hours worth, normally does 4)    Pt has hx a fib. When asked if she takes medication for this, patient states she doesn't know, that the medics have her medication list.    Pt ambulatory. Pt reports SOB, states this is not uncommon for her. Patient on 2 liters at home (or she states 5 liters since her nasal cannula is lengthy.  "

## 2017-04-08 NOTE — ED AVS SNAPSHOT
[facility]  [address]  [facility] [address]               Sisi Medel   2017  1:09 PM   ED    Description:  Female : 1960   Department:  [facility]           Your Care was Coordinated By:     Provider Role From To    Edvin Mena MD Attending Provider [phone] --      Reason for Visit     Chest Pain           Diagnoses this Visit        Comments    Paroxysmal atrial fibrillation    -  Primary     Atrial flutter, unspecified type         Chest pain, unspecified type         ESRD on hemodialysis           ED Disposition     None           To Do List           Follow-up Information     Follow up with Bladimir Adorno MD In 1 week.    Specialties:  Cardiology, Electrophysiology    Why:  follow up and ablation     Contact information:    [address]  [facility] [address]  [phone]         These Medications        Disp Refills Start End    digoxin (LANOXIN) 125 mcg tablet 15 tablet 1 2017    Take 1 tablet (0.125 mg total) by mouth every other day. - Oral    Pharmacy: [facility] [id number] [place] [address] AT [facility] Ph #: [phone]         [facility]     [facility] Care [facility] -  Assistance  Unless otherwise directed by your provider, please contact [facility], our nurse care line that is available for  assistance.     Registered nurses in the [facility] provide: appointment scheduling, clinical advisement, health education, and other advisory services.  Call: [phone] (toll free)               Medications           Message regarding Medications     Verify the changes and/or additions to your medication regime listed below are the same as discussed with your clinician today.  If any of these changes or additions are incorrect, please notify your healthcare provider.        START taking these NEW  medications        Refills    digoxin (LANOXIN) 125 mcg tablet 1    Sig: Take 1 tablet (0.125 mg total) by mouth every other day.    Class: Print    Route: Oral      These medications were administered today        Dose Freq    digoxin tablet 0.125 mg 0.125 mg Every other day    Sig: Take 1 tablet (0.125 mg total) by mouth every other day.    Class: Normal    Route: Oral           Verify that the below list of medications is an accurate representation of the medications you are currently taking.  If none reported, the list may be blank. If incorrect, please contact your healthcare provider. Carry this list with you in case of emergency.           Current Medications     ACZONE 5 % topical gel Apply to face every morning    ammonium lactate (LAC-HYDRIN) 12 % lotion Apply topically as needed (to legs).     atorvastatin (LIPITOR) 80 MG tablet TAKE 1 TABLET(80 MG) BY MOUTH EVERY DAY    cholecalciferol, vitamin D3, 1,000 unit capsule Take 1 capsule (1,000 Units total) by mouth once daily.    clopidogrel (PLAVIX) 75 mg tablet Take 1 tablet (75 mg total) by mouth once daily.    diclofenac sodium 1 % Gel Apply 2 g topically every 8 (eight) hours as needed.    econazole nitrate 1 % cream Apply topically 2 (two) times daily.     fludrocortisone (FLORINEF) 0.1 mg Tab Take 100 mcg by mouth 2 (two) times daily.    gabapentin (NEURONTIN) 300 MG capsule TAKE ONE CAPSULE BY MOUTH THREE TIMES DAILY    ketorolac 0.5% (ACULAR) 0.5 % Drop instill one drop into both eyes every morning    levetiracetam (KEPPRA) 500 MG Tab Take 1 tablet (500 mg total) by mouth 2 (two) times daily.    midodrine (PROAMATINE) 5 MG Tab Take 2 tablets (10 mg total) by mouth every Mon, Wed, Fri, Sun.    midodrine (PROAMATINE) 5 MG Tab Take 3 tablets (15 mg total) by mouth every Tues, Thurs, Sat. Take 2 tablets (10 mg total) by mouth on non dialysis days.    omeprazole (PRILOSEC) 20 MG capsule Take 1 capsule (20 mg total) by mouth once daily.    polyethylene  "glycol (GLYCOLAX) 17 gram/dose powder Take 17 g by mouth once daily. Dissolve in juice.    prednisoLONE acetate (PRED FORTE) 1 % DrpS INSTILL ONE DROP INTO  LEFT EYE THREE TIMES A DAY    predniSONE (DELTASONE) 10 MG tablet Takes one 10 mg tablet every morning    RENVELA 800 mg Tab TAKE 1 TABLET BY MOUTH THREE TIMES DAILY WITH MEALS    warfarin (COUMADIN) 5 MG tablet Take 1 tablet (5 mg total) by mouth Daily. Per [facility]    digoxin (LANOXIN) 125 mcg tablet Take 1 tablet (0.125 mg total) by mouth every other day.    digoxin tablet 0.125 mg Take 1 tablet (0.125 mg total) by mouth every other day.    docusate sodium (COLACE) 100 MG capsule Take 100 mg by mouth 2 (two) times daily as needed for Constipation.    inulin-sorbitol 2 gram Chew Take 2 tablets by mouth 2 (two) times daily.    lactulose (CHRONULAC) 20 gram/30 mL Soln Take 15 mLs (10 g total) by mouth 3 (three) times daily as needed (for constipation).           Clinical Reference Information           Your Vitals Were     BP Pulse Temp Resp Height Weight    108/78 106 97.5 °F (36.4 °C) (Oral) 21 5' 8" (1.727 m) 118.2 kg (260 lb 9.3 oz)    Last Period SpO2 BMI          (LMP Unknown) 94% 39.62 kg/m2        Allergies as of 4/8/2017        Reactions    Bactrim [Sulfamethoxazole-trimethoprim] Other (See Comments)    Causes renal failure    Nsaids (Non-steroidal Anti-inflammatory Drug) Other (See Comments)    Renal failure      Immunizations Administered on Date of Encounter - 4/8/2017     None      ED Micro, Lab, POCT     Start Ordered       Status Ordering Provider    04/08/17 1832 04/08/17 1831    STAT,   Status:  Canceled      Canceled     04/08/17 1830 04/08/17 1829  Troponin I  STAT      In process     04/08/17 1421 04/08/17 1422  APTT  Once      Final result     04/08/17 1421 04/08/17 1422  Blood culture x two cultures. Draw prior to antibiotics.  Every 15 min     Comments:  Aerobic and anaerobic   Start Status   04/08/17 1421 In process   04/08/17 1436 " In process       Acknowledged     04/08/17 1421 04/08/17 1422  Brain natriuretic peptide  STAT      Final result     04/08/17 1421 04/08/17 1422  CBC auto differential  STAT      Final result     04/08/17 1421 04/08/17 1422  Comprehensive metabolic panel  STAT      Final result     04/08/17 1421 04/08/17 1422  Magnesium  STAT      Final result     04/08/17 1421 04/08/17 1422  Phosphorus  STAT      Final result     04/08/17 1421 04/08/17 1422  Protime-INR  STAT      Final result     04/08/17 1421 04/08/17 1422  Troponin I  STAT      Final result     04/08/17 1421 04/08/17 1422  TSH  Once      Final result     04/08/17 1421 04/08/17 1422    STAT,   Status:  Canceled      Canceled     04/08/17 1421 04/08/17 1422    STAT,   Status:  Canceled      Canceled       ED Imaging Orders     Start Ordered       Status Ordering Provider    04/08/17 1421 04/08/17 1422  X-Ray Chest AP Portable  1 time imaging      Final result         Discharge Instructions         Discharge Instructions for Atrial Fibrillation  You have been diagnosed with an abnormal heart rhythm called atrial fibrillation. With this condition, your hearts 2 upper chambers quiver rather than squeeze the blood out in a normal pattern. This leads to an irregular and sometimes rapid heartbeat. Some people will develop associated symptoms such as a flip-flopping heartbeat, chest pain, lightheadedness, or shortness of breath. Other people may have no symptoms at all. Atrial fibrillation is serious because it affects the hearts ability to fill with blood as it should. Blood clots may form. This increases the risk for stroke. Untreated atrial fibrillation can also lead to heart failure. Atrial fibrillation can be controlled. With treatment, most people with atrial fibrillation lead normal lives.  Treatment options  Recommended treatment for atrial fibrillation depends on your age, symptoms, how long you have had atrial fibrillation, and other factors. You will have a  complete evaluation to find out if you have any abnormalities that caused your heart to go into atrial fibrillation. This might be blocked heart arteries or a thyroid problem. Your doctor will assess your particular case and discuss choices with you.  Treatment choices may include:  · Treating an underlying disorder that puts you at risk for atrial fibrillation. For example, correcting an abnormal thyroid or electrolyte problem, or treating a blocked heart artery.  · Restoring a normal heart rhythm with an electrical shock (cardioversion) or with an antiarrhythmic medicine (chemical cardioversion)  · Using medicine to control your heart rate in atrial fibrillation.  · Preventing the risk for blood clot and stroke using blood-thinning medicines. Your doctor will tell you what he or she recommends. Choices may include aspirin, clopidogrel, warfarin, dabigatran, rivaroxaban, apixaban, and edoxaban.  · Doing catheter ablation or a surgical maze procedure. These use different methods to destroy certain areas of heart tissue. This interrupts the electrical signals causing atrial fibrillation. One of these procedures may be a choice when medicines do not work, or as an alternative to long-term medicine.  · Other treatment choices may be recommended for you by your doctor.  Managing risk factors for stroke and preventing heart failure are important parts of any treatment plan for atrial fibrillation.  Home care  · Take your medicines exactly as directed. Dont skip doses.  · Work with your doctor to find the right medicines and doses for you.  · Learn to take your own pulse. Keep a record of your results. Ask your doctor which pulse rates mean that you need medical attention. Slowing your pulse is often the goal of treatment. Ask your doctor if its OK for you to use an automatic machine to check your pulse at home. Sometimes these machines dont count the pulse correctly when you have atrial fibrillation.  · Limit your  intake of coffee, tea, cola, and other beverages with caffeine. Talk with your doctor about whether you should eliminate caffeine.  · Avoid over-the-counter medicines that have caffeine in them.  · Let your doctor know what medicines you take, including prescription and over-the-counter medicines, as well as any supplements. They interfere with some medicines given for atrial fibrillation.  · Ask your doctor about whether you can drink alcohol. Some people need to avoid alcohol to better treat atrial fibrillation. If you are taking blood-thinner medicines, alcohol may interfere with them by increasing their effect.  · Never take stimulants such as amphetamines or cocaine. These drugs can speed up your heart rate and trigger atrial fibrillation.  Follow-up care  Follow up with your doctor, or as advised.     When should I call my healthcare provider  Call your healthcare provider right away if you have any of the following:  · Weakness  · Dizziness  · Fainting  · Fatigue  · Shortness of breath  · Chest pain with increased activity  · A change in the usual regularity of your heartbeat, or an unusually fast heartbeat   Date Last Reviewed: 4/23/2016  © 3907-4409 [employer]. [address] [place] [address]. All rights reserved. This information is not intended as a substitute for professional medical care. Always follow your healthcare professional's instructions.          Having Catheter Ablation  A heart rhythm problem (arrhythmia) can make your heart beat too fast or in an irregular pattern. The problem is often caused by cells in your heart that arent working as they should. Or, it may be the result of an abnormal electric circuit. It may cause bothersome symptoms, such as an irregular heartbeat, dizziness, shortness of breath, chest pain, or fainting. Your doctor has recommended catheter ablation to treat your arrhythmia. This non-surgical procedure destroys the cells that are causing the  problem.  Before the procedure    Before your catheter ablation, you will meet with the cardiac electrophysiologist (specially trained heart doctor) who will do the procedure. He or she will tell you how to get ready. You will likely be told to stop or change your heart rhythm medicines for a period of time before the procedure. Follow your doctors instructions. Also:  · Tell the doctor about all prescription and over-the-counter medicines you take. This includes herbs, supplements, and vitamins. It also includes daily medicines, such as insulin or blood thinners. If you are allergic to any medicines, tell the doctor.  · Have any routine tests, such as blood tests, as recommended.  · Dont eat or drink anything 12 hours before the procedure.  How catheter ablation is done  Catheter ablation uses thin, flexible wires (electrode catheters) to find and destroy (ablate) problem cells. Heres how the procedure is done:  · The hearts signals are mapped. To find the problem, an electrophysiology study (EPS) is done. During this study, the doctor tries to start (induce) your arrhythmia. An electrical map of the heart is then created. This shows the type of arrhythmia you have and where the problem is. Using the map as a guide, the doctor knows where to ablate.  · Problem areas are destroyed using heat or cold therapy. Once the EPS shows where the problem is, an electrode catheter is thread through a blood vessel to that area in the heart. Energy is sent through the catheter to destroy the problem cells.  · The hearts rhythm is tested again. After ablating the problem cells, the doctor tries to restart (reinduce) your arrhythmia. If a fast rhythm cant be induced, the ablation is a success. But if a fast rhythm does start again, further ablation may be needed.  Your experience during catheter ablation  In most cases, catheter ablation is done in an electrophysiology (EP) lab. It often takes 2 to 4  hours, and sometimes  longer. Youll receive medicine to prevent pain. Medicine will also help you relax or sleep during the procedure. If you feel uncomfortable during the procedure, tell the doctor or nurse:  · Getting started. First, skin on your groin (or rarely, the neck) is washed. Any hair in that area may be removed. This is where the catheters will be inserted. An IV (intravenous) line is started in your arm. Medicines and fluids are provided through this IV. To help keep the insertion site germ-free (sterile), your body is draped with sheets. Only the area where the catheters will be inserted is exposed.  · Inserting the catheters. The skin where the catheters will be inserted is numbed with a local anesthetic. This is so you wont feel pain. Then a small needle is used to make punctures in your vein or artery. Catheters are inserted through these punctures and guided to the heart with the help of X-ray monitors.  · Wires are then placed in various places in the heart to map the electrical signals as well as to stimulate the heart.  · Finishing up. When the procedure is finished, the catheters are taken out of your body. Pressure is applied to the puncture sites to stop any bleeding. No stitches are needed. Youre then taken to a recovery room to rest. You'll need to remain lying down for a few hours. You'll be asked not to move the leg where the catheters were inserted for a few hours.   Risks and complications  The risks of catheter ablation are fairly low compared to the benefits you receive. Discuss these risks with your doctor before the procedure. Possible risks and complications include:  · Bleeding or bruising at the catheter insertion site  · Blood clots  · A slow heart rhythm (requiring a permanent pacemaker)  · Perforation of the heart muscle, blood vessel, or lung (may require an emergency procedure)  · Damage to a heart valve (rare)  · Stroke or heart attack, also known as acute myocardial infarction, or AMI  (rare)  · Infection, which is a risk after any invasive procedure. This may be indicated by a fever of 100.4°F (38.0°C) or higher, drainage, or redness and pain at the catheter insertion site.  · Death (extremely rare)   Date Last Reviewed: 5/1/2016  © 5568-3586 [employer]. [address] [place] [address]. All rights reserved. This information is not intended as a substitute for professional medical care. Always follow your healthcare professional's instructions.          Your Scheduled Appointments     Apr 12, 2017  8:00 AM CDT   Non-Fasting Lab with LAB, APPOINTMENT [place] [facility] ([facility])    [address] [facility] [address] [phone]            Apr 28, 2017  8:00 AM CDT   Established Patient Visit with Carlos A Caputo MD   [facility] - Internal Medicine ([facility])    [address]  [place] [address] [phone]            Apr 28, 2017 12:30 PM CDT   EKG with EKG, APPT   [facility] - EKG ([facility] )    [address]  [place] [address]   [phone]            Apr 28, 2017  1:00 PM CDT   Pacemaker Tune Up with PACEMAKER, ICD   [facility] ([facility] )    [address]  [place] [address] [phone]            Apr 28, 2017  1:40 PM CDT   Established Patient Visit with Bladimir Adorno MD   [facility] - Arrhythmia ([facility] )    [address]  [place] [address] [phone]              Smoking Cessation     If you would like to quit smoking:   You may be eligible for free services if you are a [place] resident and started smoking cigarettes before September 1, 1988.  Call the Smoking Cessation Trust (SCT) toll free at (372) 608-6898 or (903) 543-8681.   Call 1-800-QUIT-NOW if you do not meet the above criteria.   Contact us via email: [email or web address]   View our  website for more information: [email or web address]         [facility] complies with applicable Federal civil rights laws and does not discriminate on the basis of race, color, national origin, age, disability, or sex.        Language Assistance Services     ATTENTION: Language assistance services are available, free of charge. Please call [phone].      ATENCIÓN: Si habla español, tiene a muniz disposición servicios gratuitos de asistencia lingüística. Llame al [phone].     CHÚ Ý: N?u b?n nói Ti?ng Vi?t, có các d?ch v? h? tr? ngôn ng? mi?n phí dành cho b?n. G?i s? [phone].

## 2017-04-12 ENCOUNTER — ANTI-COAG VISIT (OUTPATIENT)
Dept: CARDIOLOGY | Facility: CLINIC | Age: 57
End: 2017-04-12

## 2017-04-12 ENCOUNTER — LAB VISIT (OUTPATIENT)
Dept: LAB | Facility: OTHER | Age: 57
End: 2017-04-12
Attending: INTERNAL MEDICINE
Payer: MEDICARE

## 2017-04-12 DIAGNOSIS — I48.0 PAROXYSMAL ATRIAL FIBRILLATION: ICD-10-CM

## 2017-04-12 DIAGNOSIS — Z79.01 LONG-TERM (CURRENT) USE OF ANTICOAGULANTS: ICD-10-CM

## 2017-04-12 LAB
INR PPP: 2.7
PROTHROMBIN TIME: 29.3 SEC

## 2017-04-12 PROCEDURE — 36415 COLL VENOUS BLD VENIPUNCTURE: CPT

## 2017-04-12 PROCEDURE — 85610 PROTHROMBIN TIME: CPT

## 2017-04-12 NOTE — PROGRESS NOTES
Patient went to the ED 4/7-4/8 for chest pain and found to be in A.fib. Warfarin wasn't given while inpatient however patient took 5mg prior to admission. No warfarin was missed. She was discharged home without any changes in medications.

## 2017-04-13 LAB
BACTERIA BLD CULT: NORMAL
BACTERIA BLD CULT: NORMAL

## 2017-04-15 DIAGNOSIS — M62.838 MUSCLE SPASM: ICD-10-CM

## 2017-04-16 NOTE — TELEPHONE ENCOUNTER
Refill request rec'd for tizanidine (zanaflex). Please contact patient to see if she has been taking this; this was previously removed from her med list as she had said she wasn't taking it (due to tiredness?) and we didn't specifically discuss this at her last visit.

## 2017-04-18 NOTE — TELEPHONE ENCOUNTER
Spoke with pt--pt states she still uses the tizanidine 4mg, pt states she takes it every other day. Pt says it doesn't make her tired. Please advise.

## 2017-04-19 ENCOUNTER — ANTI-COAG VISIT (OUTPATIENT)
Dept: CARDIOLOGY | Facility: CLINIC | Age: 57
End: 2017-04-19
Payer: MEDICARE

## 2017-04-19 DIAGNOSIS — I48.0 PAROXYSMAL ATRIAL FIBRILLATION: ICD-10-CM

## 2017-04-19 DIAGNOSIS — Z79.01 LONG-TERM (CURRENT) USE OF ANTICOAGULANTS: Primary | ICD-10-CM

## 2017-04-19 LAB
CTP QC/QA: YES
INR PPP: 3.4 (ref 2–3)
PROTHROMBIN TIME, POC: NORMAL (ref 2–3)

## 2017-04-19 PROCEDURE — 85610 PROTHROMBIN TIME: CPT | Mod: QW,S$GLB,,

## 2017-04-19 NOTE — PROGRESS NOTES
INR supra therapeutic today without any complications. She denies changes to medications or health that would affect INR since last visit. She is not consistent with her diet, as she states her frequency in intake of greens depends on what she cooks week to week. She states that she thinks she actually ate more greens this week than normal, but she doesn't know for sure. She also isn't sure if she will have greens this week or not. I asked if she would be willing to and she might, but likely won't. Since she seems as if she likely won't have her usual greens this week, I will hold her dose today and resume her previous regimen. She will follow up in 1 week. I explained the importance of consistency in her diet and she understands, however, she is not as willing to stick to a certain amount of vit k foods each week. Patient advised to contact clinic with any changes, questions, or concerns.

## 2017-04-20 ENCOUNTER — TELEPHONE (OUTPATIENT)
Dept: ENDOCRINOLOGY | Facility: CLINIC | Age: 57
End: 2017-04-20

## 2017-04-22 ENCOUNTER — HOSPITAL ENCOUNTER (OUTPATIENT)
Facility: HOSPITAL | Age: 57
Discharge: HOME | End: 2017-04-23
Attending: EMERGENCY MEDICINE | Admitting: HOSPITALIST
Payer: MEDICARE

## 2017-04-22 DIAGNOSIS — E86.1 HYPOVOLEMIA: ICD-10-CM

## 2017-04-22 DIAGNOSIS — N18.6 ANEMIA IN ESRD (END-STAGE RENAL DISEASE): Chronic | ICD-10-CM

## 2017-04-22 DIAGNOSIS — R07.9 CHEST PAIN: ICD-10-CM

## 2017-04-22 DIAGNOSIS — N18.6 ESRD ON HEMODIALYSIS: Chronic | ICD-10-CM

## 2017-04-22 DIAGNOSIS — Z79.01 LONG-TERM (CURRENT) USE OF ANTICOAGULANTS: ICD-10-CM

## 2017-04-22 DIAGNOSIS — I48.21 PERMANENT ATRIAL FIBRILLATION: ICD-10-CM

## 2017-04-22 DIAGNOSIS — D63.1 ANEMIA IN ESRD (END-STAGE RENAL DISEASE): Chronic | ICD-10-CM

## 2017-04-22 DIAGNOSIS — I50.42 CHRONIC COMBINED SYSTOLIC AND DIASTOLIC HEART FAILURE: Chronic | ICD-10-CM

## 2017-04-22 DIAGNOSIS — I48.0 PAROXYSMAL ATRIAL FIBRILLATION: Chronic | ICD-10-CM

## 2017-04-22 DIAGNOSIS — R79.89 ELEVATED TROPONIN: ICD-10-CM

## 2017-04-22 DIAGNOSIS — D86.0 PULMONARY SARCOIDOSIS: Chronic | ICD-10-CM

## 2017-04-22 DIAGNOSIS — Z79.52 CURRENT CHRONIC USE OF SYSTEMIC STEROIDS: Chronic | ICD-10-CM

## 2017-04-22 DIAGNOSIS — R55 SYNCOPE, UNSPECIFIED SYNCOPE TYPE: ICD-10-CM

## 2017-04-22 DIAGNOSIS — Z99.2 ESRD ON HEMODIALYSIS: Chronic | ICD-10-CM

## 2017-04-22 DIAGNOSIS — R55 SYNCOPE: Primary | ICD-10-CM

## 2017-04-22 LAB
ALBUMIN SERPL BCP-MCNC: 3.7 G/DL
ALP SERPL-CCNC: 153 U/L
ALT SERPL W/O P-5'-P-CCNC: 22 U/L
ANION GAP SERPL CALC-SCNC: 17 MMOL/L
AST SERPL-CCNC: 25 U/L
BASOPHILS # BLD AUTO: 0.02 K/UL
BASOPHILS NFR BLD: 0.2 %
BILIRUB SERPL-MCNC: 0.7 MG/DL
BNP SERPL-MCNC: 206 PG/ML
BUN SERPL-MCNC: 18 MG/DL
CALCIUM SERPL-MCNC: 10.4 MG/DL
CHLORIDE SERPL-SCNC: 90 MMOL/L
CO2 SERPL-SCNC: 30 MMOL/L
CREAT SERPL-MCNC: 4.3 MG/DL
DIFFERENTIAL METHOD: ABNORMAL
EOSINOPHIL # BLD AUTO: 0.1 K/UL
EOSINOPHIL NFR BLD: 0.4 %
ERYTHROCYTE [DISTWIDTH] IN BLOOD BY AUTOMATED COUNT: 14.5 %
EST. GFR  (AFRICAN AMERICAN): 12.6 ML/MIN/1.73 M^2
EST. GFR  (NON AFRICAN AMERICAN): 10.9 ML/MIN/1.73 M^2
GLUCOSE SERPL-MCNC: 142 MG/DL
HCT VFR BLD AUTO: 52 %
HGB BLD-MCNC: 16.2 G/DL
INR PPP: 1.8
LYMPHOCYTES # BLD AUTO: 1.9 K/UL
LYMPHOCYTES NFR BLD: 16.6 %
MCH RBC QN AUTO: 30.6 PG
MCHC RBC AUTO-ENTMCNC: 31.2 %
MCV RBC AUTO: 98 FL
MONOCYTES # BLD AUTO: 1.4 K/UL
MONOCYTES NFR BLD: 11.9 %
NEUTROPHILS # BLD AUTO: 8.3 K/UL
NEUTROPHILS NFR BLD: 70.4 %
PLATELET # BLD AUTO: 209 K/UL
PMV BLD AUTO: 10.6 FL
POTASSIUM SERPL-SCNC: 3.9 MMOL/L
PROT SERPL-MCNC: 8.8 G/DL
PROTHROMBIN TIME: 18.3 SEC
RBC # BLD AUTO: 5.3 M/UL
SODIUM SERPL-SCNC: 137 MMOL/L
TROPONIN I SERPL DL<=0.01 NG/ML-MCNC: 0.24 NG/ML
TROPONIN I SERPL DL<=0.01 NG/ML-MCNC: 0.24 NG/ML
WBC # BLD AUTO: 11.72 K/UL

## 2017-04-22 PROCEDURE — 25000003 PHARM REV CODE 250: Performed by: HOSPITALIST

## 2017-04-22 PROCEDURE — G0378 HOSPITAL OBSERVATION PER HR: HCPCS

## 2017-04-22 PROCEDURE — 84484 ASSAY OF TROPONIN QUANT: CPT | Mod: 91

## 2017-04-22 PROCEDURE — 93005 ELECTROCARDIOGRAM TRACING: CPT

## 2017-04-22 PROCEDURE — 99285 EMERGENCY DEPT VISIT HI MDM: CPT | Mod: 25

## 2017-04-22 PROCEDURE — 99219 PR INITIAL OBSERVATION CARE,LEVL II: CPT | Mod: ,,, | Performed by: PHYSICIAN ASSISTANT

## 2017-04-22 PROCEDURE — 85025 COMPLETE CBC W/AUTO DIFF WBC: CPT

## 2017-04-22 PROCEDURE — 85610 PROTHROMBIN TIME: CPT

## 2017-04-22 PROCEDURE — 93010 ELECTROCARDIOGRAM REPORT: CPT | Mod: 76,,, | Performed by: INTERNAL MEDICINE

## 2017-04-22 PROCEDURE — 36415 COLL VENOUS BLD VENIPUNCTURE: CPT

## 2017-04-22 PROCEDURE — 80053 COMPREHEN METABOLIC PANEL: CPT

## 2017-04-22 PROCEDURE — 84484 ASSAY OF TROPONIN QUANT: CPT

## 2017-04-22 PROCEDURE — 99284 EMERGENCY DEPT VISIT MOD MDM: CPT | Mod: ,,, | Performed by: EMERGENCY MEDICINE

## 2017-04-22 PROCEDURE — 96360 HYDRATION IV INFUSION INIT: CPT

## 2017-04-22 PROCEDURE — 93010 ELECTROCARDIOGRAM REPORT: CPT | Mod: ,,, | Performed by: INTERNAL MEDICINE

## 2017-04-22 PROCEDURE — 25000003 PHARM REV CODE 250: Performed by: PHYSICIAN ASSISTANT

## 2017-04-22 PROCEDURE — 96361 HYDRATE IV INFUSION ADD-ON: CPT

## 2017-04-22 PROCEDURE — 83880 ASSAY OF NATRIURETIC PEPTIDE: CPT

## 2017-04-22 RX ORDER — METHOCARBAMOL 750 MG/1
750 TABLET, FILM COATED ORAL
Status: COMPLETED | OUTPATIENT
Start: 2017-04-22 | End: 2017-04-22

## 2017-04-22 RX ORDER — DIGOXIN 125 MCG
0.12 TABLET ORAL EVERY OTHER DAY
Status: DISCONTINUED | OUTPATIENT
Start: 2017-04-23 | End: 2017-04-23 | Stop reason: HOSPADM

## 2017-04-22 RX ORDER — PREDNISONE 10 MG/1
10 TABLET ORAL DAILY
Status: DISCONTINUED | OUTPATIENT
Start: 2017-04-23 | End: 2017-04-23 | Stop reason: HOSPADM

## 2017-04-22 RX ORDER — ONDANSETRON 8 MG/1
8 TABLET, ORALLY DISINTEGRATING ORAL EVERY 8 HOURS PRN
Status: DISCONTINUED | OUTPATIENT
Start: 2017-04-22 | End: 2017-04-23 | Stop reason: HOSPADM

## 2017-04-22 RX ORDER — ACETAMINOPHEN 325 MG/1
650 TABLET ORAL
Status: COMPLETED | OUTPATIENT
Start: 2017-04-22 | End: 2017-04-22

## 2017-04-22 RX ORDER — SEVELAMER CARBONATE 800 MG/1
800 TABLET, FILM COATED ORAL
Status: DISCONTINUED | OUTPATIENT
Start: 2017-04-22 | End: 2017-04-23 | Stop reason: HOSPADM

## 2017-04-22 RX ORDER — MIDODRINE HYDROCHLORIDE 5 MG/1
15 TABLET ORAL
Status: DISCONTINUED | OUTPATIENT
Start: 2017-04-22 | End: 2017-04-23 | Stop reason: HOSPADM

## 2017-04-22 RX ORDER — KETOROLAC TROMETHAMINE 5 MG/ML
1 SOLUTION OPHTHALMIC DAILY
Status: DISCONTINUED | OUTPATIENT
Start: 2017-04-23 | End: 2017-04-23 | Stop reason: HOSPADM

## 2017-04-22 RX ORDER — ATORVASTATIN CALCIUM 20 MG/1
80 TABLET, FILM COATED ORAL DAILY
Status: DISCONTINUED | OUTPATIENT
Start: 2017-04-23 | End: 2017-04-23 | Stop reason: HOSPADM

## 2017-04-22 RX ORDER — MIDODRINE HYDROCHLORIDE 5 MG/1
10 TABLET ORAL
Status: DISCONTINUED | OUTPATIENT
Start: 2017-04-23 | End: 2017-04-23 | Stop reason: HOSPADM

## 2017-04-22 RX ORDER — IBUPROFEN 200 MG
16 TABLET ORAL
Status: DISCONTINUED | OUTPATIENT
Start: 2017-04-22 | End: 2017-04-23 | Stop reason: HOSPADM

## 2017-04-22 RX ORDER — ASPIRIN 325 MG
325 TABLET ORAL ONCE
Status: COMPLETED | OUTPATIENT
Start: 2017-04-22 | End: 2017-04-22

## 2017-04-22 RX ORDER — ONDANSETRON 2 MG/ML
4 INJECTION INTRAMUSCULAR; INTRAVENOUS EVERY 12 HOURS PRN
Status: DISCONTINUED | OUTPATIENT
Start: 2017-04-22 | End: 2017-04-23 | Stop reason: HOSPADM

## 2017-04-22 RX ORDER — LEVETIRACETAM 500 MG/1
500 TABLET ORAL 2 TIMES DAILY
Status: DISCONTINUED | OUTPATIENT
Start: 2017-04-22 | End: 2017-04-23 | Stop reason: HOSPADM

## 2017-04-22 RX ORDER — PREDNISOLONE ACETATE 10 MG/ML
1 SUSPENSION/ DROPS OPHTHALMIC 3 TIMES DAILY
Status: DISCONTINUED | OUTPATIENT
Start: 2017-04-22 | End: 2017-04-23 | Stop reason: HOSPADM

## 2017-04-22 RX ORDER — PANTOPRAZOLE SODIUM 20 MG/1
20 TABLET, DELAYED RELEASE ORAL DAILY
Status: DISCONTINUED | OUTPATIENT
Start: 2017-04-23 | End: 2017-04-23 | Stop reason: HOSPADM

## 2017-04-22 RX ORDER — DOCUSATE SODIUM 100 MG/1
100 CAPSULE, LIQUID FILLED ORAL 2 TIMES DAILY PRN
Status: DISCONTINUED | OUTPATIENT
Start: 2017-04-22 | End: 2017-04-23 | Stop reason: HOSPADM

## 2017-04-22 RX ORDER — GABAPENTIN 300 MG/1
300 CAPSULE ORAL 3 TIMES DAILY
Status: DISCONTINUED | OUTPATIENT
Start: 2017-04-22 | End: 2017-04-23 | Stop reason: HOSPADM

## 2017-04-22 RX ORDER — CHOLECALCIFEROL (VITAMIN D3) 25 MCG
1000 TABLET ORAL DAILY
Status: DISCONTINUED | OUTPATIENT
Start: 2017-04-23 | End: 2017-04-23 | Stop reason: HOSPADM

## 2017-04-22 RX ORDER — GLUCAGON 1 MG
1 KIT INJECTION
Status: DISCONTINUED | OUTPATIENT
Start: 2017-04-22 | End: 2017-04-23 | Stop reason: HOSPADM

## 2017-04-22 RX ORDER — ACETAMINOPHEN 325 MG/1
650 TABLET ORAL EVERY 6 HOURS PRN
Status: DISCONTINUED | OUTPATIENT
Start: 2017-04-22 | End: 2017-04-23 | Stop reason: HOSPADM

## 2017-04-22 RX ORDER — FLUDROCORTISONE ACETATE 0.1 MG/1
100 TABLET ORAL 2 TIMES DAILY
Status: DISCONTINUED | OUTPATIENT
Start: 2017-04-22 | End: 2017-04-23 | Stop reason: HOSPADM

## 2017-04-22 RX ORDER — IBUPROFEN 200 MG
24 TABLET ORAL
Status: DISCONTINUED | OUTPATIENT
Start: 2017-04-22 | End: 2017-04-23 | Stop reason: HOSPADM

## 2017-04-22 RX ORDER — WARFARIN SODIUM 5 MG/1
5 TABLET ORAL DAILY
Status: DISCONTINUED | OUTPATIENT
Start: 2017-04-22 | End: 2017-04-23 | Stop reason: HOSPADM

## 2017-04-22 RX ORDER — AMOXICILLIN 250 MG
1 CAPSULE ORAL 2 TIMES DAILY PRN
Status: DISCONTINUED | OUTPATIENT
Start: 2017-04-22 | End: 2017-04-23 | Stop reason: HOSPADM

## 2017-04-22 RX ADMIN — GABAPENTIN 300 MG: 300 CAPSULE ORAL at 09:04

## 2017-04-22 RX ADMIN — SEVELAMER CARBONATE 800 MG: 800 TABLET, FILM COATED ORAL at 05:04

## 2017-04-22 RX ADMIN — WARFARIN SODIUM 5 MG: 5 TABLET ORAL at 05:04

## 2017-04-22 RX ADMIN — LEVETIRACETAM 500 MG: 500 TABLET, FILM COATED ORAL at 09:04

## 2017-04-22 RX ADMIN — PREDNISOLONE ACETATE 1 DROP: 10 SUSPENSION/ DROPS OPHTHALMIC at 09:04

## 2017-04-22 RX ADMIN — ASPIRIN 325 MG ORAL TABLET 325 MG: 325 PILL ORAL at 01:04

## 2017-04-22 RX ADMIN — FLUDROCORTISONE ACETATE 100 MCG: 0.1 TABLET ORAL at 09:04

## 2017-04-22 RX ADMIN — METHOCARBAMOL 750 MG: 750 TABLET ORAL at 01:04

## 2017-04-22 RX ADMIN — SODIUM CHLORIDE 500 ML: 0.9 INJECTION, SOLUTION INTRAVENOUS at 01:04

## 2017-04-22 RX ADMIN — ACETAMINOPHEN 650 MG: 325 TABLET ORAL at 10:04

## 2017-04-22 NOTE — ASSESSMENT & PLAN NOTE
- Repeat EKG pending. Continue digoxin, warfarin. Will check digoxin level with am labs. Currently rate controlled.

## 2017-04-22 NOTE — ASSESSMENT & PLAN NOTE
- Patient presented after decreased level of consciousness during HD today. Patient received extra sessions of dialysis this week. Hemoconcentrated on admission (Hemoglobin 16.2). Troponin elevated. Chest pain and headache resolved prior to admission.   - Orthostatics positive in ED. Will repeat q shift.  - Likely secondary to volume depletion in setting of extra HD sessions this week  - Patient with improvement in symptoms after 500 cc bolus NS in ED  - Will repeat EKG, monitor on telemetry. Hold off on further fluids in setting of ESRD on HD.    - Will trend troponins.

## 2017-04-22 NOTE — H&P
"[facility] Hospital Medicine  History & Physical    Patient Name: Sisi Medel  MRN: [id number]  Admission Date: 4/22/2017  Attending Physician: Evan Leigh MD   Primary Care Provider: Carlos A Caputo MD    [facility] Medicine Team: [facility] HOSP MED E Huma Farris PA-C     Patient information was obtained from patient and ER records.     Subjective:     Principal Problem:Syncope    Chief Complaint:   Chief Complaint   Patient presents with    Loss of Consciousness        HPI: 57 yo female with extensive past medical history including pulmonary HTN, Afib, HTN, ESRD on HD, and CHF presented to ED from dialysis after an episode of decreased level of consciousness. Patient reports she does not remember much of the event, but reports she was having lower extremity muscle spasms and then became nauseated and started feeling "out of it". She states she received an extra session of HD yesterday in addition to her regularly scheduled TTSa schedule this week. Per ED note, dialysis nurse Yanique states that patient became lethargic while nurse went to get emesis basin, and she turned down the dialysis rate. Patient did not rouse to verbal stimuli, did rouse to sternal rub but remained lethargic. BP remained stable throughout, HR WNL, . EMS arrived, patient steadily became more arousable and conversing prior to leaving in EMS. In the ED, patient reports she is feeling better than when she presented but not quite back to baseline, endorsing continued feeling of weakness. States she had some chest pain described as "someone pressing on it" and shortness of breath during dialysis but that this is common for her during dialysis. She endorses associated headache, sweats, palpitations. She no longer makes urine. Last BM this am. She is on home O2.     In the ED, orthostatics positive, CBC with hemoglobin 16.2, Cr at baseline, electrolytes WNL w/ HCO3 of 30 INR 1.8, Troponin 0.236, , CXR " with mild interstitial edema. Patient given 500 cc NS bolus. Patient feeling much better after PO fluids and tylenol. Admitted to observation for further evaluation and treatment.           Past Medical History:   Diagnosis Date    Anemia in ESRD (end-stage renal disease) 3/7/2016    Anticoagulant long-term use     Cervical radiculopathy 2015    Chronic combined systolic and diastolic heart failure 2013    EF 20% , improved with Medical therapy, negative PET     Chronic respiratory failure with hypoxia 2013    On home oxygen at 2-5 liters    Closed fracture of proximal end of right fibula 2016    Complications due to renal dialysis device, implant, and graft     DDD (degenerative disc disease), lumbar 2015    ESRD on hemodialysis 2016    Essential hypertension     Gout     Lateral meniscal tear 2016    Lumbar stenosis 2015    Paroxysmal atrial fibrillation 2014    Not on anticoagulation    Peripheral neuropathy 2013    Personal history of gastric ulcer 3/19/2013    Sarcoidosis, lung 2009    Diagnosed in  with ocular manifestation, on 4L home O2 and PO steroids    Secondary pulmonary hypertension 2015    Seizure disorder 3/19/2013    Shingles 2013    Thyroid disease        Past Surgical History:   Procedure Laterality Date    ABDOMINAL SURGERY       SECTION      x2    OTHER SURGICAL HISTORY      loop recorder implant    VASCULAR SURGERY         Review of patient's allergies indicates:   Allergen Reactions    Bactrim [sulfamethoxazole-trimethoprim] Other (See Comments)     Causes renal failure    Nsaids (non-steroidal anti-inflammatory drug) Other (See Comments)     Renal failure       No current facility-administered medications on file prior to encounter.      Current Outpatient Prescriptions on File Prior to Encounter   Medication Sig    atorvastatin (LIPITOR) 80 MG tablet TAKE 1 TABLET(80 MG) BY MOUTH  EVERY DAY    cholecalciferol, vitamin D3, 1,000 unit capsule Take 1 capsule (1,000 Units total) by mouth once daily.    digoxin (LANOXIN) 125 mcg tablet Take 1 tablet (0.125 mg total) by mouth every other day.    gabapentin (NEURONTIN) 300 MG capsule TAKE ONE CAPSULE BY MOUTH THREE TIMES DAILY    lactulose (CHRONULAC) 20 gram/30 mL Soln Take 15 mLs (10 g total) by mouth 3 (three) times daily as needed (for constipation).    levetiracetam (KEPPRA) 500 MG Tab Take 1 tablet (500 mg total) by mouth 2 (two) times daily.    midodrine (PROAMATINE) 5 MG Tab Take 2 tablets (10 mg total) by mouth every Mon, Wed, Fri, Sun.    midodrine (PROAMATINE) 5 MG Tab Take 3 tablets (15 mg total) by mouth every Tues, Thurs, Sat. Take 2 tablets (10 mg total) by mouth on non dialysis days.    omeprazole (PRILOSEC) 20 MG capsule Take 1 capsule (20 mg total) by mouth once daily.    predniSONE (DELTASONE) 10 MG tablet Takes one 10 mg tablet every morning (Patient taking differently: Takes one 10 mg tablet every morning with breakfast)    RENVELA 800 mg Tab TAKE 1 TABLET BY MOUTH THREE TIMES DAILY WITH MEALS    warfarin (COUMADIN) 5 MG tablet Take 1 tablet (5 mg total) by mouth Daily. Per [facility]    ACZONE 5 % topical gel Apply to face every morning    ammonium lactate (LAC-HYDRIN) 12 % lotion Apply topically as needed (to legs).     clopidogrel (PLAVIX) 75 mg tablet Take 1 tablet (75 mg total) by mouth once daily.    diclofenac sodium 1 % Gel Apply 2 g topically every 8 (eight) hours as needed.    docusate sodium (COLACE) 100 MG capsule Take 100 mg by mouth 2 (two) times daily as needed for Constipation.    econazole nitrate 1 % cream Apply topically 2 (two) times daily.     fludrocortisone (FLORINEF) 0.1 mg Tab Take 100 mcg by mouth 2 (two) times daily.    inulin-sorbitol 2 gram Chew Take 2 tablets by mouth 2 (two) times daily.    ketorolac 0.5% (ACULAR) 0.5 % Drop instill one drop into both eyes every morning     polyethylene glycol (GLYCOLAX) 17 gram/dose powder Take 17 g by mouth once daily. Dissolve in juice.    prednisoLONE acetate (PRED FORTE) 1 % DrpS INSTILL ONE DROP INTO  LEFT EYE THREE TIMES A DAY     Family History     Problem Relation (Age of Onset)    Coronary artery disease Father    Diabetes Mother, Father, Maternal Grandmother    Hypertension Mother, Father    Kidney failure Mother    Lupus Sister        Social History Main Topics    Smoking status: Former Smoker     Packs/day: 0.50     Years: 10.00     Types: Cigarettes     Quit date: 4/22/1994    Smokeless tobacco: Never Used    Alcohol use No    Drug use: No    Sexual activity: Not on file     Review of Systems   Constitutional: Positive for chills. Negative for appetite change, fatigue and fever.   HENT: Positive for postnasal drip. Negative for congestion, sore throat and trouble swallowing.    Respiratory: Positive for shortness of breath (during HD). Negative for cough and wheezing.    Cardiovascular: Positive for chest pain (during HD) and palpitations. Negative for leg swelling.   Gastrointestinal: Positive for abdominal pain and nausea. Negative for constipation, diarrhea and vomiting.   Genitourinary: Negative for flank pain.        Anuric   Musculoskeletal: Negative for back pain, gait problem and joint swelling.   Skin: Negative for pallor, rash and wound.   Neurological: Positive for syncope (decreased level of consciousness), weakness and headaches. Negative for facial asymmetry and speech difficulty.   Psychiatric/Behavioral: Negative for agitation, confusion and hallucinations.     Objective:     Vital Signs (Most Recent):  Temp: 97.2 °F (36.2 °C) (04/22/17 0927)  Pulse: 88 (04/22/17 1401)  Resp: 20 (04/22/17 1401)  BP: 117/83 (04/22/17 1401)  SpO2: 95 % (04/22/17 1401) Vital Signs (24h Range):  Temp:  [97.2 °F (36.2 °C)] 97.2 °F (36.2 °C)  Pulse:  [] 88  Resp:  [16-20] 20  SpO2:  [94 %-100 %] 95 %  BP: ()/(54-84) 117/83      Weight: 120.8 kg (266 lb 5.1 oz)  Body mass index is 40.49 kg/(m^2).    Physical Exam   Constitutional: She is oriented to person, place, and time. She appears well-developed and well-nourished. No distress.   HENT:   Head: Normocephalic and atraumatic.   Right Ear: External ear normal.   Left Ear: External ear normal.   Nose: Nose normal.   Mouth/Throat: Oropharynx is clear and moist.   Eyes: Conjunctivae and EOM are normal. Pupils are equal, round, and reactive to light.   Cardiovascular: Normal rate, regular rhythm and intact distal pulses.  Exam reveals no friction rub.    No murmur heard.  Pulmonary/Chest: Effort normal and breath sounds normal. No respiratory distress. She has no wheezes. She has no rales.   Abdominal: Soft. Bowel sounds are normal. She exhibits no distension. There is no tenderness. There is no rebound and no guarding.   Musculoskeletal: Normal range of motion. She exhibits no edema or deformity.   Neurological: She is alert and oriented to person, place, and time. No cranial nerve deficit.   Skin: She is not diaphoretic.   Psychiatric: She has a normal mood and affect. Her behavior is normal. Judgment and thought content normal.        Significant Labs:   CBC:   Recent Labs  Lab 04/22/17  1140   WBC 11.72   HGB 16.2*   HCT 52.0*        CMP:   Recent Labs  Lab 04/22/17  1140      K 3.9   CL 90*   CO2 30*   *   BUN 18   CREATININE 4.3*   CALCIUM 10.4   PROT 8.8*   ALBUMIN 3.7   BILITOT 0.7   ALKPHOS 153*   AST 25   ALT 22   ANIONGAP 17*   EGFRNONAA 10.9*     Troponin:   Recent Labs  Lab 04/22/17  1140   TROPONINI 0.236*     All pertinent labs within the past 24 hours have been reviewed.    Significant Imaging: I have reviewed all pertinent imaging results/findings within the past 24 hours.    Assessment/Plan:     * Syncope  - Patient presented after decreased level of consciousness during HD today. Patient received extra sessions of dialysis this week. Hemoconcentrated  "on admission (Hemoglobin 16.2). Troponin elevated. Chest pain and headache resolved prior to admission.   - Orthostatics positive in ED. Will repeat q shift.  - Likely secondary to volume depletion in setting of extra HD sessions this week  - Patient with improvement in symptoms after 500 cc bolus NS in ED  - Will repeat EKG, monitor on telemetry. Hold off on further fluids in setting of ESRD on HD.    - Will trend troponins.      ESRD on hemodialysis  - Patient on TTSa schedule as outpatient, last HD today. Received extra session of HD this week, patient reports she was told she "needed it" but unsure of other specifics  - Renal function panel daily  - Will consult nephrology if patient with changes in renal function panel/indication for HD prior to next scheduled session       Paroxysmal atrial fibrillation  - Repeat EKG pending. Continue digoxin, warfarin. Will check digoxin level with am labs. Currently rate controlled.       Chronic combined systolic and diastolic heart failure  - Last echo 6/2016 with EF 50, diastolic dysfunction   - Repeat 2D echo pending      Pulmonary sarcoidosis  - Continue prednisone 10 mg daily      Seizure disorder  - Continue Keppra 500 mg BID      VTE Risk Mitigation         Ordered     warfarin (COUMADIN) tablet 5 mg  Daily     Route:  Oral        04/22/17 1549     Medium Risk of VTE  Once      04/22/17 1549     Place sequential compression device  Until discontinued      04/22/17 1549     Place GUICHO hose  Until discontinued      04/22/17 1549     Place sequential compression device  Until discontinued      04/22/17 1418        Huma Farris PA-C  Department of Hospital Medicine   [facility]  Staff: Dr. Leigh  "

## 2017-04-22 NOTE — PLAN OF CARE
Problem: Patient Care Overview  Goal: Plan of Care Review  Outcome: Ongoing (interventions implemented as appropriate)  Pt aaox3, vss, no s/s of distress noted.  Safety precautions maintained, non skid socks and fall risk band applied.  Pt placed on telemetry.  Pt denies pain.  Call light within reach.

## 2017-04-22 NOTE — ED NOTES
Unable to obtain blood work via IV line or butterfly stick. Phlebotomy called to draw labs. Dr Gerber notified

## 2017-04-22 NOTE — ED PROVIDER NOTES
"Encounter Date: 4/22/2017    SCRIBE #1 NOTE: I, Gurjit Thomas, am scribing for, and in the presence of,  Dr. Gerber . I have scribed the following portions of the note - the Resident attestation.       History     Chief Complaint   Patient presents with    Loss of Consciousness     Review of patient's allergies indicates:   Allergen Reactions    Bactrim [sulfamethoxazole-trimethoprim] Other (See Comments)     Causes renal failure    Nsaids (non-steroidal anti-inflammatory drug) Other (See Comments)     Renal failure     HPI Comments: 55 y/o woman w/ extensive medical history as below sent from dialysis for episode of decreased level of consciousness. History per patient and dialysis nurse Yanique (by phone). Patient feeling well prior to dialysis. During session began complaining of nausea, nurse went to get emesis basin and noted patient becoming lethargic. Turned down rate on dialysis. Patient did not rouse to verbal stimuli. Did rouse to sternal rub but remained lethargic. BP stable throughout, HR WNL, . EMS called, patient became steadily more arousable and was awake and conversing prior to being transported. She began complaining of some chest pain after arousing, but the patient reports she had the pain during dialysis but she always gets some CP when being dialyzed. She currently complains of a throbbing headache. She states her chest hurts where they were "pressing." She also c/o pain and spasms of the right foot and leg which is chronic.    Past Medical History:   Diagnosis Date    Anemia in ESRD (end-stage renal disease) 3/7/2016    Anticoagulant long-term use     Cervical radiculopathy 7/20/2015    Chronic combined systolic and diastolic heart failure 6/14/2013    EF 20% 2013, improved with Medical therapy, negative PET 2013    Chronic respiratory failure with hypoxia 04/22/2013    On home oxygen at 2-5 liters    Closed fracture of proximal end of right fibula 6/27/2016    " Complications due to renal dialysis device, implant, and graft     DDD (degenerative disc disease), lumbar 2015    ESRD on hemodialysis 2016    Essential hypertension     Gout     Lateral meniscal tear 2016    Lumbar stenosis 2015    Paroxysmal atrial fibrillation 2014    Not on anticoagulation    Peripheral neuropathy 2013    Personal history of gastric ulcer 3/19/2013    Sarcoidosis, lung 2009    Diagnosed in  with ocular manifestation, on 4L home O2 and PO steroids    Secondary pulmonary hypertension 2015    Seizure disorder 3/19/2013    Shingles 2013    Thyroid disease      Past Surgical History:   Procedure Laterality Date    ABDOMINAL SURGERY       SECTION      x2    OTHER SURGICAL HISTORY      loop recorder implant    VASCULAR SURGERY       Family History   Problem Relation Age of Onset    Kidney failure Mother     Hypertension Mother     Diabetes Mother     Coronary artery disease Father     Hypertension Father     Diabetes Father     Lupus Sister     Diabetes Maternal Grandmother     Colon cancer Neg Hx     Ovarian cancer Neg Hx     Breast cancer Neg Hx     Melanoma Neg Hx      Social History   Substance Use Topics    Smoking status: Former Smoker     Packs/day: 0.50     Years: 10.00     Types: Cigarettes     Quit date: 1994    Smokeless tobacco: Never Used    Alcohol use No     Review of Systems   Constitutional: Negative for chills and fever.   HENT: Negative for nosebleeds and sore throat.    Respiratory: Negative for cough and shortness of breath.    Cardiovascular: Positive for chest pain and palpitations.   Gastrointestinal: Positive for nausea. Negative for abdominal pain, blood in stool, diarrhea and vomiting.   Genitourinary:        Anuric   Musculoskeletal: Negative for back pain.   Skin: Negative for rash.   Neurological: Positive for headaches. Negative for dizziness, seizures, weakness and  light-headedness.   Hematological: Does not bruise/bleed easily.       Physical Exam   Initial Vitals   BP Pulse Resp Temp SpO2   04/22/17 0927 04/22/17 0927 04/22/17 0927 04/22/17 0927 04/22/17 0927   108/84 91 16 97.2 °F (36.2 °C) 98 %     Physical Exam    Nursing note and vitals reviewed.  Constitutional: She appears well-developed. She is not diaphoretic. No distress.   Uncomfortable appearing woman, non-toxic appearing, wearing nasal cannula at 2L   HENT:   Head: Normocephalic and atraumatic.   Mouth/Throat: Oropharynx is clear and moist.   Eyes: EOM are normal. Pupils are equal, round, and reactive to light.   Neck: Normal range of motion. Neck supple.   Cardiovascular: Normal rate and regular rhythm.   Murmur (II/VI systolic loudest over aortic area) heard.  Pulmonary/Chest: No stridor. No respiratory distress. She has no wheezes. She has no rhonchi. She has no rales.   Abdominal: Soft. Bowel sounds are normal. She exhibits no distension. There is no tenderness. There is no rebound.   Musculoskeletal: Normal range of motion.   No LE swelling or asymmetry. Multiple bruises to b/l legs in different stages of evolution.   Neurological: She is alert and oriented to person, place, and time.   AAOx3. CNs 2-12 intact. Motor 5/5 upper and lower extremities bilaterally. Sensation grossly intact. Bicipital reflexes 2+ and patellar reflexes 2+ and symmetrical. Coordination (by finger-to-nose and heel-to-shin) intact. Gait deferred.   Skin: Skin is warm.   Psychiatric: She has a normal mood and affect.         ED Course   Procedures  Labs Reviewed   CBC W/ AUTO DIFFERENTIAL   COMPREHENSIVE METABOLIC PANEL   TROPONIN I   B-TYPE NATRIURETIC PEPTIDE   PROTIME-INR                   APC / Resident Notes:   PGY3 MDM  H/o ESRD, a-fib s/p witnessed syncopal episode with no noted hypotension during episode, no clear etiology. Pt did have extra HD session this week.  DDx incl orthostasis, vasovagal, ACS, arrhythmia, CVA, among  others. Will get basic labs, EKG, Tn, BNP, and orthostatics. Patient appears well now except in discomfort. Will monitor and reassess pending results.  Jose D Meza M.D.  PGY3 [employer] EM/IM  4/22/2017 10:31 AM    Update  Orthostatics positive. Hemoconcentrated. Cr at baseline, electrolytes WNL w/ HCO3 of 30 also supporting volume depletion. Giving 500ml NS bolus. Patient feeling much better after PO fluids and tylenol. H/A and CP resolved. Will reassess after bolus completed.  Jose D Meza M.D.  PGY3 [employer] EM/IM  4/22/2017 1:16 PM    Pt feels improved, but given risk factors including a-fib, will admit for observation to ensure no cardiac cause syncope.         Scribe Attestation:   Scribe #1: I performed the above scribed service and the documentation accurately describes the services I performed. I attest to the accuracy of the note.    Attending Attestation:   Physician Attestation Statement for Resident:  As the supervising MD   Physician Attestation Statement: I have personally seen and examined this patient.   I agree with the above history. -:   As the supervising MD I agree with the above PE.    As the supervising MD I agree with the above treatment, course, plan, and disposition.   -:     Patient with multiple co-morbidities including ESRD and A-fib, sent from dialysis after a witnessed syncopal episode during session. Pt had extra sessions this week so syncope most likely from excessive fluid removal. However, given cardiac history and A-fib, will admit to medicine for observation.           Physician Attestation for Scribe:  Physician Attestation Statement for Scribe #1: I, Dr. Gerber, reviewed documentation, as scribed by Gurjit Thomas in my presence, and it is both accurate and complete.                 ED Course     Clinical Impression:   The encounter diagnosis was Syncope.          Phi Gerber MD  04/22/17 2782

## 2017-04-22 NOTE — NURSING
Pt arrived to unit via stretcher from ED.  Pt aaox3, vss, no s/s of distress noted.  Pt denies pain.  Pt placed on telemetry.  Bed locked and in lowest position.  Call light within reach.  Family member at bedside.

## 2017-04-22 NOTE — ED TRIAGE NOTES
"Pt was at dialysis today and blacked out, pt reports she felt fine this morning then started feeling "drained" during dialysis.  "

## 2017-04-22 NOTE — SUBJECTIVE & OBJECTIVE
Past Medical History:   Diagnosis Date    Anemia in ESRD (end-stage renal disease) 3/7/2016    Anticoagulant long-term use     Cervical radiculopathy 2015    Chronic combined systolic and diastolic heart failure 2013    EF 20% , improved with Medical therapy, negative PET     Chronic respiratory failure with hypoxia 2013    On home oxygen at 2-5 liters    Closed fracture of proximal end of right fibula 2016    Complications due to renal dialysis device, implant, and graft     DDD (degenerative disc disease), lumbar 2015    ESRD on hemodialysis 2016    Essential hypertension     Gout     Lateral meniscal tear 2016    Lumbar stenosis 2015    Paroxysmal atrial fibrillation 2014    Not on anticoagulation    Peripheral neuropathy 2013    Personal history of gastric ulcer 3/19/2013    Sarcoidosis, lung 2009    Diagnosed in  with ocular manifestation, on 4L home O2 and PO steroids    Secondary pulmonary hypertension 2015    Seizure disorder 3/19/2013    Shingles 2013    Thyroid disease        Past Surgical History:   Procedure Laterality Date    ABDOMINAL SURGERY       SECTION      x2    OTHER SURGICAL HISTORY      loop recorder implant    VASCULAR SURGERY         Review of patient's allergies indicates:   Allergen Reactions    Bactrim [sulfamethoxazole-trimethoprim] Other (See Comments)     Causes renal failure    Nsaids (non-steroidal anti-inflammatory drug) Other (See Comments)     Renal failure       No current facility-administered medications on file prior to encounter.      Current Outpatient Prescriptions on File Prior to Encounter   Medication Sig    atorvastatin (LIPITOR) 80 MG tablet TAKE 1 TABLET(80 MG) BY MOUTH EVERY DAY    cholecalciferol, vitamin D3, 1,000 unit capsule Take 1 capsule (1,000 Units total) by mouth once daily.    digoxin (LANOXIN) 125 mcg tablet Take 1 tablet (0.125 mg total) by mouth  every other day.    gabapentin (NEURONTIN) 300 MG capsule TAKE ONE CAPSULE BY MOUTH THREE TIMES DAILY    lactulose (CHRONULAC) 20 gram/30 mL Soln Take 15 mLs (10 g total) by mouth 3 (three) times daily as needed (for constipation).    levetiracetam (KEPPRA) 500 MG Tab Take 1 tablet (500 mg total) by mouth 2 (two) times daily.    midodrine (PROAMATINE) 5 MG Tab Take 2 tablets (10 mg total) by mouth every Mon, Wed, Fri, Sun.    midodrine (PROAMATINE) 5 MG Tab Take 3 tablets (15 mg total) by mouth every Tues, Thurs, Sat. Take 2 tablets (10 mg total) by mouth on non dialysis days.    omeprazole (PRILOSEC) 20 MG capsule Take 1 capsule (20 mg total) by mouth once daily.    predniSONE (DELTASONE) 10 MG tablet Takes one 10 mg tablet every morning (Patient taking differently: Takes one 10 mg tablet every morning with breakfast)    RENVELA 800 mg Tab TAKE 1 TABLET BY MOUTH THREE TIMES DAILY WITH MEALS    warfarin (COUMADIN) 5 MG tablet Take 1 tablet (5 mg total) by mouth Daily. [facility]    ACZONE 5 % topical gel Apply to face every morning    ammonium lactate (LAC-HYDRIN) 12 % lotion Apply topically as needed (to legs).     clopidogrel (PLAVIX) 75 mg tablet Take 1 tablet (75 mg total) by mouth once daily.    diclofenac sodium 1 % Gel Apply 2 g topically every 8 (eight) hours as needed.    docusate sodium (COLACE) 100 MG capsule Take 100 mg by mouth 2 (two) times daily as needed for Constipation.    econazole nitrate 1 % cream Apply topically 2 (two) times daily.     fludrocortisone (FLORINEF) 0.1 mg Tab Take 100 mcg by mouth 2 (two) times daily.    inulin-sorbitol 2 gram Chew Take 2 tablets by mouth 2 (two) times daily.    ketorolac 0.5% (ACULAR) 0.5 % Drop instill one drop into both eyes every morning    polyethylene glycol (GLYCOLAX) 17 gram/dose powder Take 17 g by mouth once daily. Dissolve in juice.    prednisoLONE acetate (PRED FORTE) 1 % DrpS INSTILL ONE DROP INTO  LEFT EYE THREE TIMES A  DAY     Family History     Problem Relation (Age of Onset)    Coronary artery disease Father    Diabetes Mother, Father, Maternal Grandmother    Hypertension Mother, Father    Kidney failure Mother    Lupus Sister        Social History Main Topics    Smoking status: Former Smoker     Packs/day: 0.50     Years: 10.00     Types: Cigarettes     Quit date: 4/22/1994    Smokeless tobacco: Never Used    Alcohol use No    Drug use: No    Sexual activity: Not on file     Review of Systems   Constitutional: Positive for chills. Negative for appetite change, fatigue and fever.   HENT: Positive for postnasal drip. Negative for congestion, sore throat and trouble swallowing.    Respiratory: Positive for shortness of breath (during HD). Negative for cough and wheezing.    Cardiovascular: Positive for chest pain (during HD) and palpitations. Negative for leg swelling.   Gastrointestinal: Positive for abdominal pain and nausea. Negative for constipation, diarrhea and vomiting.   Genitourinary: Negative for flank pain.        Anuric   Musculoskeletal: Negative for back pain, gait problem and joint swelling.   Skin: Negative for pallor, rash and wound.   Neurological: Positive for syncope (decreased level of consciousness), weakness and headaches. Negative for facial asymmetry and speech difficulty.   Psychiatric/Behavioral: Negative for agitation, confusion and hallucinations.     Objective:     Vital Signs (Most Recent):  Temp: 97.2 °F (36.2 °C) (04/22/17 0927)  Pulse: 88 (04/22/17 1401)  Resp: 20 (04/22/17 1401)  BP: 117/83 (04/22/17 1401)  SpO2: 95 % (04/22/17 1401) Vital Signs (24h Range):  Temp:  [97.2 °F (36.2 °C)] 97.2 °F (36.2 °C)  Pulse:  [] 88  Resp:  [16-20] 20  SpO2:  [94 %-100 %] 95 %  BP: ()/(54-84) 117/83     Weight: 120.8 kg (266 lb 5.1 oz)  Body mass index is 40.49 kg/(m^2).    Physical Exam   Constitutional: She is oriented to person, place, and time. She appears well-developed and well-nourished.  No distress.   HENT:   Head: Normocephalic and atraumatic.   Right Ear: External ear normal.   Left Ear: External ear normal.   Nose: Nose normal.   Mouth/Throat: Oropharynx is clear and moist.   Eyes: Conjunctivae and EOM are normal. Pupils are equal, round, and reactive to light.   Cardiovascular: Normal rate, regular rhythm and intact distal pulses.  Exam reveals no friction rub.    No murmur heard.  Pulmonary/Chest: Effort normal and breath sounds normal. No respiratory distress. She has no wheezes. She has no rales.   Abdominal: Soft. Bowel sounds are normal. She exhibits no distension. There is no tenderness. There is no rebound and no guarding.   Musculoskeletal: Normal range of motion. She exhibits no edema or deformity.   Neurological: She is alert and oriented to person, place, and time. No cranial nerve deficit.   Skin: She is not diaphoretic.   Psychiatric: She has a normal mood and affect. Her behavior is normal. Judgment and thought content normal.        Significant Labs:   CBC:   Recent Labs  Lab 04/22/17  1140   WBC 11.72   HGB 16.2*   HCT 52.0*        CMP:   Recent Labs  Lab 04/22/17  1140      K 3.9   CL 90*   CO2 30*   *   BUN 18   CREATININE 4.3*   CALCIUM 10.4   PROT 8.8*   ALBUMIN 3.7   BILITOT 0.7   ALKPHOS 153*   AST 25   ALT 22   ANIONGAP 17*   EGFRNONAA 10.9*     Troponin:   Recent Labs  Lab 04/22/17  1140   TROPONINI 0.236*     All pertinent labs within the past 24 hours have been reviewed.    Significant Imaging: I have reviewed all pertinent imaging results/findings within the past 24 hours.

## 2017-04-22 NOTE — IP AVS SNAPSHOT
[facility]  [address]  [facility] [address]  Phone: [phone]           Patient Discharge Instructions   Our goal is to set you up for success. This packet includes information on your condition, medications, and your home care.  It will help you care for yourself to prevent having to return to the hospital.     Please ask your nurse if you have any questions.      There are many details to remember when preparing to leave the hospital. Here is what you will need to do:    1. Take your medicine. If you are prescribed medications, review your Medication List on the following pages. You may have new medications to  at the pharmacy and others that you'll need to stop taking. Review the instructions for how and when to take your medications. Talk with your doctor or nurses if you are unsure of what to do.     2. Go to your follow-up appointments. Specific follow-up information is listed in the following pages. Your may be contacted by a nurse or clinical provider about future appointments. Be sure we have all of the phone numbers to reach you. Please contact your provider's office if you are unable to make an appointment.     3. Watch for warning signs. Your doctor or nurse will give you detailed warning signs to watch for and when to call for assistance. These instructions may also include educational information about your condition. If you experience any of warning signs to your health, call your doctor.           [facility]  Unless otherwise directed by your provider, please   contact [facility], our nurse care line   that is available for 24/7 assistance.     [phone] (toll-free)     Registered nurses in the [facility]   provide: appointment scheduling, clinical advisement, health education, and other advisory services.                  ** Verify the list of medication(s) below is accurate and up to date. Carry this with you in case of  emergency. If your medications have changed, please notify your healthcare provider.             Medication List      CHANGE how you take these medications        Additional Info                      predniSONE 10 MG tablet   Commonly known as:  DELTASONE   Quantity:  60 tablet   Refills:  3   What changed:  additional instructions    Last time this was given:  10 mg on 4/23/2017  9:07 AM   Instructions:  Takes one 10 mg tablet every morning     Begin Date    AM    Noon    PM    Bedtime         CONTINUE taking these medications        Additional Info                      ACZONE 5 % topical gel   Refills:  2   Generic drug:  dapsone    Instructions:  Apply to face every morning     Begin Date    AM    Noon    PM    Bedtime       ammonium lactate 12 % lotion   Commonly known as:  LAC-HYDRIN   Refills:  2    Instructions:  Apply topically as needed (to legs).     Begin Date    AM    Noon    PM    Bedtime       atorvastatin 80 MG tablet   Commonly known as:  LIPITOR   Quantity:  90 tablet   Refills:  3    Last time this was given:  80 mg on 4/23/2017  9:06 AM   Instructions:  TAKE 1 TABLET(80 MG) BY MOUTH EVERY DAY     Begin Date    AM    Noon    PM    Bedtime       cholecalciferol (vitamin D3) 1,000 unit capsule   Refills:  0   Dose:  1000 Units    Instructions:  Take 1 capsule (1,000 Units total) by mouth once daily.     Begin Date    AM    Noon    PM    Bedtime       diclofenac sodium 1 % Gel   Quantity:  100 g   Refills:  0   Dose:  2 g    Instructions:  Apply 2 g topically every 8 (eight) hours as needed.     Begin Date    AM    Noon    PM    Bedtime       digoxin 125 mcg tablet   Commonly known as:  LANOXIN   Quantity:  15 tablet   Refills:  1   Dose:  0.125 mg    Last time this was given:  0.125 mg on 4/23/2017  9:07 AM   Instructions:  Take 1 tablet (0.125 mg total) by mouth every other day.     Begin Date    AM    Noon    PM    Bedtime       docusate sodium 100 MG capsule   Commonly known as:  COLACE   Refills:   0   Dose:  100 mg    Instructions:  Take 100 mg by mouth 2 (two) times daily as needed for Constipation.     Begin Date    AM    Noon    PM    Bedtime       econazole nitrate 1 % cream   Refills:  0    Instructions:  Apply topically 2 (two) times daily.     Begin Date    AM    Noon    PM    Bedtime       fludrocortisone 0.1 mg Tab   Commonly known as:  FLORINEF   Refills:  0   Dose:  100 mcg    Last time this was given:  100 mcg on 4/22/2017  9:25 PM   Instructions:  Take 100 mcg by mouth 2 (two) times daily.     Begin Date    AM    Noon    PM    Bedtime       gabapentin 300 MG capsule   Commonly known as:  NEURONTIN   Quantity:  270 capsule   Refills:  3    Last time this was given:  300 mg on 4/23/2017  5:32 AM   Instructions:  TAKE ONE CAPSULE BY MOUTH THREE TIMES DAILY     Begin Date    AM    Noon    PM    Bedtime       inulin-sorbitol 2 gram Chew   Refills:  0   Dose:  2 tablet    Instructions:  Take 2 tablets by mouth 2 (two) times daily.     Begin Date    AM    Noon    PM    Bedtime       ketorolac 0.5% 0.5 % Drop   Commonly known as:  ACULAR   Refills:  3    Last time this was given:  1 drop on 4/23/2017  9:06 AM   Instructions:  instill one drop into both eyes every morning     Begin Date    AM    Noon    PM    Bedtime       lactulose 20 gram/30 mL Soln   Commonly known as:  CHRONULAC   Quantity:  450 mL   Refills:  1   Dose:  10 g    Instructions:  Take 15 mLs (10 g total) by mouth 3 (three) times daily as needed (for constipation).     Begin Date    AM    Noon    PM    Bedtime       levetiracetam 500 MG Tab   Commonly known as:  KEPPRA   Quantity:  60 tablet   Refills:  5   Dose:  500 mg    Last time this was given:  500 mg on 4/23/2017  9:07 AM   Instructions:  Take 1 tablet (500 mg total) by mouth 2 (two) times daily.     Begin Date    AM    Noon    PM    Bedtime       * midodrine 5 MG Tab   Commonly known as:  PROAMATINE   Quantity:  32 tablet   Refills:  11   Dose:  10 mg    Instructions:  Take 2  tablets (10 mg total) by mouth every Mon, Wed, Fri, Sun.     Begin Date    AM    Noon    PM    Bedtime       * midodrine 5 MG Tab   Commonly known as:  PROAMATINE   Refills:  0   Dose:  15 mg    Instructions:  Take 3 tablets (15 mg total) by mouth every Tues, Thurs, Sat. Take 2 tablets (10 mg total) by mouth on non dialysis days.     Begin Date    AM    Noon    PM    Bedtime       omeprazole 20 MG capsule   Commonly known as:  PRILOSEC   Quantity:  30 capsule   Refills:  5   Dose:  20 mg    Instructions:  Take 1 capsule (20 mg total) by mouth once daily.     Begin Date    AM    Noon    PM    Bedtime       polyethylene glycol 17 gram/dose powder   Commonly known as:  GLYCOLAX   Quantity:  510 g   Refills:  0   Dose:  17 g    Instructions:  Take 17 g by mouth once daily. Dissolve in juice.     Begin Date    AM    Noon    PM    Bedtime       prednisoLONE acetate 1 % Drps   Commonly known as:  PRED FORTE   Refills:  3    Last time this was given:  1 drop on 4/23/2017  5:32 AM   Instructions:  INSTILL ONE DROP INTO  LEFT EYE THREE TIMES A DAY     Begin Date    AM    Noon    PM    Bedtime       RENVELA 800 mg Tab   Quantity:  90 tablet   Refills:  0   Generic drug:  sevelamer carbonate    Last time this was given:  800 mg on 4/23/2017  7:50 AM   Instructions:  TAKE 1 TABLET BY MOUTH THREE TIMES DAILY WITH MEALS     Begin Date    AM    Noon    PM    Bedtime       warfarin 5 MG tablet   Commonly known as:  COUMADIN   Quantity:  90 tablet   Refills:  3   Dose:  5 mg   Comments:  **Patient requests 90 days supply**    Last time this was given:  5 mg on 4/22/2017  5:05 PM   Instructions:  Take 1 tablet (5 mg total) by mouth Daily. Per [facility]     Begin Date    AM    Noon    PM    Bedtime       * Notice:  This list has 2 medication(s) that are the same as other medications prescribed for you. Read the directions carefully, and ask your doctor or other care provider to review them with you.      STOP taking these  medications     clopidogrel 75 mg tablet   Commonly known as:  PLAVIX                  Please bring to all follow up appointments:    1. A copy of your discharge instructions.  2. All medicines you are currently taking in their original bottles.  3. Identification and insurance card.    Please arrive 15 minutes ahead of scheduled appointment time.    Please call 24 hours in advance if you must reschedule your appointment and/or time.        Your Scheduled Appointments     Apr 26, 2017  9:15 AM CDT   Anticoagulation with Ewelina Ramey, PharmD   [facility] ([facility] )    [address]  [place] [address]   [phone]            Apr 28, 2017  8:00 AM CDT   Established Patient Visit with Carlos A Caputo MD   [facility] - Internal Medicine ([facility])    [address]  [place] [id number] [phone]            Apr 28, 2017 12:30 PM CDT   EKG with EKG, APPT   [facility] ([facility] )    [address]  [place] [address]   [phone]            Apr 28, 2017  1:00 PM CDT   Pacemaker Tune Up with PACEMAKER, ICD   [facility]rhythmia ([facility] )    [address]  [place] [address]   [phone]            Apr 28, 2017  1:40 PM CDT   Established Patient Visit with Bladimir Adorno MD   [facility] - Arrhythmia ([facility] )    [address]  [place] [id number] [phone]              Follow-up Information     Follow up with Carlos A Caputo MD.    Specialty:  Internal Medicine    Why:  Please follow up with Dr. Rico as scheduled 4/26/17.     Contact information:    [address]  [place] [id number] [phone]          Follow up with COUMADIN, LAB.    Specialty:  Lab    Why:  Please follow up with coumadin clinic as previously scheduled 4/26/17.          Follow up with Bladimir Adorno MD.    Specialties:  Cardiology, Electrophysiology    Why:  " Please follow up with Dr. Adorno as scheduled 4/26/17.    Contact information:    [address]  [facility] [address]  [phone]          Discharge Instructions     Future Orders    Activity as tolerated     Call MD for:  increased confusion or weakness     Call MD for:  persistent dizziness, light-headedness, or visual disturbances     Diet general     Questions:    Total calories:      Fat restriction, if any:      Protein restriction, if any:      Na restriction, if any:      Fluid restriction:      Additional restrictions:          Primary Diagnosis     Your primary diagnosis was:  Fainting      Admission Information     Date & Time Provider Department CSN    4/22/2017  9:53 AM Evan Leigh MD [facility] [id number]      Care Providers     Provider Role Specialty Primary office phone    Evan Leigh MD Attending Provider Hospitalist [phone]    Evan Leigh MD Team Attending  Hospitalist [phone]    Sukumar Boykin MD Team Attending  Hospitalist [phone]    Janette Chu MD Consulting Physician  Cardiology [phone]    Hamida Desai MD Consulting Physician  Nephrology [phone]      Your Vitals Were     BP Pulse Temp Resp Height Weight    112/80 (BP Location: Right arm, Patient Position: Lying, BP Method: Automatic) 80 98.4 °F (36.9 °C) (Oral) 18 5' 8" (1.727 m) 120.8 kg (266 lb 5.1 oz)    Last Period SpO2 BMI          (LMP Unknown) 98% 40.49 kg/m2        Recent Lab Values        4/10/2012 8/22/2013 6/15/2015 10/15/2015 5/17/2016               3:00 PM 10:56 AM 11:06 AM  9:41 AM  9:46 AM       A1C 6.5 (H) 6.0 5.5 6.7 (H) 5.7               Allergies as of 4/23/2017        Reactions    Bactrim [Sulfamethoxazole-trimethoprim] Other (See Comments)    Causes renal failure    Nsaids (Non-steroidal Anti-inflammatory Drug) Other (See Comments)    Renal failure      Advance Directives     An advance directive is a document which, in the event you are no " longer able to make decisions for yourself, tells your healthcare team what kind of treatment you do or do not want to receive, or who you would like to make those decisions for you.  If you do not currently have an advance directive, [facility] encourages you to create one.  For more information call:  [phone]), [phone] ([phone]),  or log on to [email or web address].        Language Assistance Services     ATTENTION: Language assistance services are available, free of charge. Please call [phone].      ATENCIÓN: Si habla español, tiene a muniz disposición servicios gratuitos de asistencia lingüística. Llame al [phone].     CHÚ Ý: N?u b?n nói Ti?ng Vi?t, có các d?ch v? h? tr? ngôn ng? mi?n phí dành cho b?n. G?i s? [phone].        Heart Failure Education       Heart Failure: Being Active  You have a condition called heart failure. Being active doesnt mean that you have to wear yourself out. Even a little movement each day helps to strengthen your heart. If you cant get out to exercise, you can do simple stretching and strengthening exercises at home. These are good ways to keep you well-conditioned and prevent you and your heart from becoming excessively weak.    Ideas to get you started  · Add a little movement to things you do now. Walk to mail letters. Park your car at the far end of the parking lot and walk to the store. Walk up a flight of stairs instead of taking the elevator.  · Choose activities you enjoy. You might walk, swim, or ride an exercise bike. Things like gardening and washing the car count, too. Other possibilities include: washing dishes, walking the dog, walking around the mall, and doing aerobic activities with friends.  · Join a group exercise program at a [facility] or [facility], a senior center, or a community center. Or look into a hospital cardiac rehabilitation program. Ask your doctor if you qualify.  Tips to keep you going  · Get up and get dressed  each day. Go to a coffee shop and read a newspaper or go somewhere that you'll be in the presence of other active people. Youll feel more like being active.  · Make a plan. Choose one or more activities that you enjoy and that you can easily do. Then plan to do at least one each day. You might write your plan on a calendar.  · Go with a friend or a group if you like company. This can help you feel supported and stay motivated, too.  · Plan social events that you enjoy. This will keep you mentally engaged as well as physically motivated to do things you find pleasure in.  For your safety  · Talk with your healthcare provider before starting an exercise program.  · Exercise indoors when its too hot or too cold outside, or when the air quality is poor. Try walking at a shopping mall.  · Wear socks and sturdy shoes to maintain your balance and prevent falls.  · Start slowly. Do a few minutes several times a day at first. Increase your time and speed little by little.  · Stop and rest whenever you feel tired or get short of breath.  · Dont push yourself on days when you dont feel well.  Date Last Reviewed: 3/20/2016  © 7161-9178 [employer]. [address] [place] [address], [place] [address]. All rights reserved. This information is not intended as a substitute for professional medical care. Always follow your healthcare professional's instructions.              Heart Failure: Evaluating Your Heart  You have a condition called heart failure. To evaluate your condition, your doctor will examine you, ask questions, and do some tests. Along with looking for signs of heart failure, the doctor looks for any other health problems that may have led to heart failure. The results of your evaluation will help your doctor form a treatment plan.  Health history and physical exam  Your visit will start with a health history. Tell the doctor about any symptoms youve noticed and about all medicines you take. Then youll have  a physical exam. This includes listening to your heartbeat and breathing. Youll also be checked for swelling (edema) in your legs and neck. When you have fluid buildup or fluid in the lungs, it may be called congestive heart failure.  Diagnosing heart failure     During an echocardiogram, sound waves bounce off the heart. These are converted into a picture on the screen.   The following may be done to help your doctor form a diagnosis:  · X-rays show the size and shape of your heart. These pictures can also show fluid in your lungs.  · An electrocardiogram (ECG or EKG) shows the pattern of your heartbeat. Small pads (electrodes) are placed on your chest, arms, and legs. Wires connect the pads to the ECG machine, which records your hearts electrical signals. This can give the doctor information about heart function.  · An echocardiogram uses ultrasound waves to show the structure and movement of your heart muscle. This shows how well the heart pumps. It also shows the thickness of the heart walls, and if the heart is enlarged. It is one of the most useful, non-invasive tests as it provides information about the heart's general function. This helps your doctor make treatment decisions.  · Lab tests evaluate small amounts of blood or urine for signs of problems. A BNP lab test can help diagnose and evaluate heart failure. BNP stands for B-type natriuretic peptide. The ventricles secrete more BNP when heart failure worsens. Lab tests can also provide information about metabolic dysfunction or heart dysfunction.  Your treatment plan  Based on the results of your evaluation and tests, your doctor will develop a treatment plan. This plan is designed to relieve some of your heart failure symptoms and help make you more comfortable. Your treatment plan may include:  · Medicine to help your heart work better and improve your quality of life  · Changes in what you eat and drink to help prevent fluid from backing up in your  body  · Daily monitoring of your weight and heart failure symptoms to see how well your treatment plan is working  · Exercise to help you stay healthy  · Help with quitting smoking  · Emotional and psychological support to help adjust to the changes  · Referrals to other specialists to make sure you are being treated comprehensively  Date Last Reviewed: 3/21/2016  © 2523-7021 [employer]. [address] [place] [address], [place] [address]. All rights reserved. This information is not intended as a substitute for professional medical care. Always follow your healthcare professional's instructions.              Heart Failure: Making Changes to Your Diet  You have a condition called heart failure. When you have heart failure, excess fluid is more likely to build up in your body because your heart isn't working well. This makes the heart work harder to pump blood. Fluid buildup causes symptoms such as shortness of breath and swelling (edema). This is often referred to as congestive heart failure or CHF. Controlling the amount of salt (sodium) you eat may help stop fluid from building up. Your doctor may also tell you to reduce the amount of fluid you drink.  Reading food labels    Your healthcare provider will tell you how much sodium you can eat each day. Read food labels to keep track. Keep in mind that certain foods are high in salt. These include canned, frozen, and processed foods. Check the amount of sodium in each serving. Watch out for high-sodium ingredients. These include MSG (monosodium glutamate), baking soda, and sodium phosphate.   Eating less salt  Give yourself time to get used to eating less salt. It may take a little while. Here are some tips to help:  · Take the saltshaker off the table. Replace it with salt-free herb mixes and spices.  · Eat fresh or plain frozen vegetables. These have much less salt than canned vegetables.  · Choose low-sodium snacks like sodium-free pretzels, crackers, or  air-popped popcorn.  · Dont add salt to your food when youre cooking. Instead, season your foods with pepper, lemon, garlic, or onion.  · When you eat out, ask that your food be cooked without added salt.  · Avoid eating fried foods as these often have a great deal of salt.  If youre told to limit fluids  You may need to limit how much fluid you have to help prevent swelling. This includes anything that is liquid at room temperature, such as ice cream and soup. If your doctor tells you to limit fluid, try these tips:  · Measure drinks in a measuring cup before you drink them. This will help you meet daily goals.  · Chill drinks to make them more refreshing.  · Suck on frozen lemon wedges to quench thirst.  · Only drink when youre thirsty.  · Chew sugarless gum or suck on hard candy to keep your mouth moist.  · Weigh yourself daily to know if your body's fluid content is rising.  My sodium goal  Your healthcare provider may give you a sodium goal to meet each day. This includes sodium found in food as well as salt that you add. My goal is to eat no more than ___________ mg of sodium per day.     When to call your doctor  Call your doctor right away if you have any symptoms of worsening heart failure. These can include:  · Sudden weight gain  · Increased swelling of your legs or ankles  · Trouble breathing when youre resting or at night  · Increase in the number of pillows you have to sleep on  · Chest pain, pressure, discomfort, or pain in the jaw, neck, or back   Date Last Reviewed: 3/21/2016  © 6421-0031 [employer]. [address] [place] [address]. All rights reserved. This information is not intended as a substitute for professional medical care. Always follow your healthcare professional's instructions.              Heart Failure: Medicines to Help Your Heart    You have a condition called heart failure (also known as congestive heart failure, or CHF). Your doctor will likely  prescribe medicines for heart failure and any underlying health problems you have. Most heart failure patients take one or more types of medicinen. Your healthcare provider will work to find the combination of medicines that works best for you.  Heart failure medicines  Here are the most common heart failure medicines:  · ACE inhibitors lower blood pressure and decrease strain on the heart. This makes it easier for the heart to pump. Angiotensin receptor blockers have similar effects. These are prescribed for some patients instead of ACE inhibitors.  · Beta-blockers relieve stress on the heart. They also improve symptoms. They may also improve the heart's pumping action over time.  · Diuretics (also called water pills) help rid your body of excess water. This can help rid your body of swelling (edema). Having less fluid to pump means your heart doesnt have to work as hard. Some diuretics make your body lose a mineral called potassium. Your doctor will tell you if you need to take supplements or eat more foods high in potassium.  · Digoxin helps your heart pump with more strength. This helps your heart pump more blood with each beat. So, more oxygen-rich blood travels to the rest of the body.  · Aldosterone antagonists help alter hormones and decrease strain on the heart.  · Hydralazine and nitrates are two separate medicines used together to treat heart failure. They may come in one combination pill. They lower blood pressure and decrease how hard the heart has to pump.  Medicines for related conditions  Controlling other heart problems helps keep heart failure under control, too. Depending on other heart problems you have, medicines may be prescribed to:  · Lower blood pressure (antihypertensives).  · Lower cholesterol levels (statins).  · Prevent blood clots (anticoagulants or aspirin).  · Keep the heartbeat steady (antiarrhythmics).  Date Last Reviewed: 3/5/2016  © 2865-2675 [employer] [address] [id number]. All rights reserved. This information is not intended as a substitute for professional medical care. Always follow your healthcare professional's instructions.              Heart Failure: Procedures That May Help    The heart is a muscle that pumps oxygen-rich blood to all parts of the body. When you have heart failure, the heart is not able to pump as well as it should. Blood and fluid may back up into the lungs (congestive heart failure), and some parts of the body dont get enough oxygen-rich blood to work normally. These problems lead to the symptoms of heart failure.     Certain procedures may help the heart pump better in some cases of heart failure. Some procedures are done to treat health problems that may have caused the heart failure such as coronary artery disease or heart rhythm problems. For more serious heart failure, other options are available.  Treating artery and valve problems  If you have coronary artery disease or valve disease, procedures may be done to improve blood flow. This helps the heart pump better, which can improve heart failure symptoms. First, your doctor may do a cardiac catheterization to help detect clogged blood vessels or valve damage. During this procedure, a  thin tube (catheter) in inserted into a blood vessel and guided to the heart. There a dye is injected and a special type of X-ray (angiogram) is taken of the blood vessels. Procedures to open a blocked artery or fix damaged valves can also be done using catheterization.  · Angioplasty uses a balloon-tipped instrument at the end of the catheter. The balloon is inflated to widen the narrowed artery. In many cases, a stent is expanded to further support the narrowed artery. A stent is a metal mesh tube.  · Valve surgery repairs or replacement of faulty valves can also be done during catheterization so blood can flow properly through the chambers of the heart.  Bypass surgery is another  option to help treat blocked arteries. It uses a healthy blood vessel from elsewhere in the body. The healthy blood vessel is attached above and below the blocked area so that blood can flow around the blocked artery.  Treating heart rhythm problems  A device may be placed in the chest to help a weak heart maintain a healthy, heartbeat so the heart can pump more effectively:  · Pacemaker. A pacemaker is an implanted device that regulates your heartbeat electronically. It monitors your heart's rhythm and generates a painless electric impulse that helps the heart beat in a regular rhythm. A pacemaker is programmed to meet your specific heart rhythm needs.  · Biventricular pacing/cardiac resynchronization therapy. A type of pacemaker that paces both pumping chambers of the heart at the same time to coordinate contractions and to improve the heart's function. Some people with heart failure are candidates for this therapy.  · Implantable cardioverter defibrillator. A device similar to a pacemaker that senses when the heart is beating too fast and delivers an electrical shock to convert the fast rhythm to a normal rhythm. This can be a life saving device.  In severe cases  In more serious cases of heart failure when other treatments no longer work, other options may include:  · Ventricular assist devices (VADs). These are mechanical devices used to take over the pumping function for one or both of the heart's ventricles, or pumping chambers. A VAD may be necessary when heart failure progresses to the point that medicines and other treatments no longer help. In some cases, a VAD may be used as a bridge to transplant.  · Heart transplant. This is replacing the diseased heart with a healthy one from a donor. This is an option for a few people who are very sick. A heart transplant is very serious and not an option for all patients. Your doctor can tell you more.  Date Last Reviewed: 3/20/2016  © 6005-4902 [employer]. [address] [place] [address], [place] [address]. All rights reserved. This information is not intended as a substitute for professional medical care. Always follow your healthcare professional's instructions.              Heart Failure: Tracking Your Weight  You have a condition called heart failure. When you have heart failure, a sudden weight gain or a steady rise in weight is a warning sign that your body is retaining too much water and salt. This could mean your heart failure is getting worse. If left untreated, it can cause problems for your lungs and result in shortness of breath. Weighing yourself each day is the best way to know if youre retaining water. If your weight goes up quickly, call your doctor. You will be given instructions on how to get rid of the excess water. You will likely need medicines and to avoid salt. This will help your heart work better.  Call your doctor if you gain more than 2 pounds in 1 day, more than 5 pounds in 1 week, or whatever weight gain you were told to report by your doctor. This is often a sign of worsening heart failure and needs to be evaluated and treated. Your doctor will tell you what to do next.   Tips for weighing yourself    · Weigh yourself at the same time each morning, wearing the same clothes. Weigh yourself after urinating and before eating.  · Use the same scale each day. Make sure the numbers are easy to read. Put the scale on a flat, hard surface -- not on a rug or carpet.  · Do not stop weighing yourself. If you forget one day, weigh again the next morning.  How to use your weight chart  · Keep your weight chart near the scale. Write your weight on the chart as soon as you get off the scale.  · Fill in the month and the start date on the chart. Then write down your weight each day. Your chart will look like this:    · If you miss a day, leave the space blank. Weigh yourself the next day and write your weight in the next space.  · Take your weight  chart with you when you go to see your doctor.  Date Last Reviewed: 3/20/2016  © 5498-2326 [employer]. [address] [place] [address], [place] [address]. All rights reserved. This information is not intended as a substitute for professional medical care. Always follow your healthcare professional's instructions.              Heart Failure: Warning Signs of a Flare-Up  You have a condition called heart failure. Once you have heart failure, flare-ups can happen. Below are signs that can mean your heart failure is getting worse. If you notice any of these warning signs, call your healthcare provider.  Swelling    · Your feet, ankles, or lower legs get puffier.  · You notice skin changes on your lower legs.  · Your shoes feel too tight.  · Your clothes are tighter in the waist.  · You have trouble getting rings on or off your fingers.  Shortness of breath  · You have to breathe harder even when youre doing your normal activities or when youre resting.  · You are short of breath walking up stairs or even short distances.  · You wake up at night short of breath or coughing.  · You need to use more pillows or sit up to sleep.  · You wake up tired or restless.  Other warning signs  · You feel weaker, dizzy, or more tired.  · You have chest pain or changes in your heartbeat.  · You have a cough that wont go away.  · You cant remember things or dont feel like eating.  Tracking your weight  Gaining weight is often the first warning sign that heart failure is getting worse. Gaining even a few pounds can be a sign that your body is retaining excess water and salt. Weighing yourself each day in the morning after you urinate and before you eat, is the best way to know if you're retaining water. Get a scale that is easy to read and make sure you wear the same clothes and use the same scale every time you weigh. Your healthcare provider will show you how to track your weight. Call your doctor if you gain more than 2  pounds in 1 day, 5 pounds in 1 week, or whatever weight gain you were told to report by your doctor. This is often a sign of worsening heart failure and needs to be evaluated and treated before it compromises your breathing. Your doctor will tell you what to do next.    Date Last Reviewed: 3/15/2016  © 9332-8105 [employer]. [address] [place] [address], [place] [address]. All rights reserved. This information is not intended as a substitute for professional medical care. Always follow your healthcare professional's instructions.              Pneumonmia Discharge Instructions                Coumadin Discharge Instructions                         Chronic Kindey Disease Education              [facility] complies with applicable Federal civil rights laws and does not discriminate on the basis of race, color, national origin, age, disability, or sex.

## 2017-04-22 NOTE — ED NOTES
Pt identifiers Sisi Medel were checked and are correct  LOC: The patient is awake, alert, aware of environment with an appropriate affect. Oriented x3, speaking appropriately  APPEARANCE: Pt resting comfortably, in no acute distress, pt is clean and well groomed, clothing properly fastened  SKIN: Skin warm, dry and intact, normal skin turgor, moist mucus membranes  RESPIRATORY: Airway is open and patent, respirations are spontaneous, even and unlabored, normal effort and rate  CARDIAC: Irregular rate and rhythm, trace peripheral edema noted, capillary refill < 3 seconds, bilateral radial pulses 2+  ABDOMEN: Soft, nontender, nondistended.  NEUROLOGIC: No neuro deficits noted.  Follows all commands appropriately  MUSCULOSKELETAL: No obvious deformities.

## 2017-04-22 NOTE — ASSESSMENT & PLAN NOTE
"- Patient on TTSa schedule as outpatient, last HD today. Received extra session of HD this week, patient reports she was told she "needed it" but unsure of other specifics  - Renal function panel daily  - Will consult nephrology if patient with changes in renal function panel/indication for HD prior to next scheduled session     "

## 2017-04-23 VITALS
RESPIRATION RATE: 18 BRPM | HEIGHT: 68 IN | WEIGHT: 266.31 LBS | OXYGEN SATURATION: 97 % | HEART RATE: 80 BPM | TEMPERATURE: 98 F | BODY MASS INDEX: 40.36 KG/M2 | SYSTOLIC BLOOD PRESSURE: 116 MMHG | DIASTOLIC BLOOD PRESSURE: 81 MMHG

## 2017-04-23 LAB
ALBUMIN SERPL BCP-MCNC: 3.2 G/DL
ANION GAP SERPL CALC-SCNC: 19 MMOL/L
BASOPHILS # BLD AUTO: 0.03 K/UL
BASOPHILS NFR BLD: 0.3 %
BUN SERPL-MCNC: 42 MG/DL
CALCIUM SERPL-MCNC: 9.9 MG/DL
CHLORIDE SERPL-SCNC: 91 MMOL/L
CO2 SERPL-SCNC: 25 MMOL/L
CREAT SERPL-MCNC: 6.2 MG/DL
DIFFERENTIAL METHOD: ABNORMAL
DIGOXIN SERPL-MCNC: 0.9 NG/ML
EOSINOPHIL # BLD AUTO: 0.1 K/UL
EOSINOPHIL NFR BLD: 1 %
ERYTHROCYTE [DISTWIDTH] IN BLOOD BY AUTOMATED COUNT: 14.5 %
EST. GFR  (AFRICAN AMERICAN): 8.1 ML/MIN/1.73 M^2
EST. GFR  (NON AFRICAN AMERICAN): 7 ML/MIN/1.73 M^2
GLUCOSE SERPL-MCNC: 140 MG/DL
HCT VFR BLD AUTO: 44.1 %
HGB BLD-MCNC: 14.2 G/DL
INR PPP: 1.7
LYMPHOCYTES # BLD AUTO: 2.3 K/UL
LYMPHOCYTES NFR BLD: 23.1 %
MCH RBC QN AUTO: 30.3 PG
MCHC RBC AUTO-ENTMCNC: 32.2 %
MCV RBC AUTO: 94 FL
MONOCYTES # BLD AUTO: 1.1 K/UL
MONOCYTES NFR BLD: 10.9 %
NEUTROPHILS # BLD AUTO: 6.3 K/UL
NEUTROPHILS NFR BLD: 64.5 %
PHOSPHATE SERPL-MCNC: 4.8 MG/DL
PLATELET # BLD AUTO: 228 K/UL
PMV BLD AUTO: 11 FL
POTASSIUM SERPL-SCNC: 5.2 MMOL/L
PROTHROMBIN TIME: 17 SEC
RBC # BLD AUTO: 4.68 M/UL
SODIUM SERPL-SCNC: 135 MMOL/L
TROPONIN I SERPL DL<=0.01 NG/ML-MCNC: 0.24 NG/ML
TROPONIN I SERPL DL<=0.01 NG/ML-MCNC: 0.25 NG/ML
WBC # BLD AUTO: 9.81 K/UL

## 2017-04-23 PROCEDURE — 84484 ASSAY OF TROPONIN QUANT: CPT | Mod: 91

## 2017-04-23 PROCEDURE — 99219 PR INITIAL OBSERVATION CARE,LEVL II: CPT | Mod: GC,,, | Performed by: INTERNAL MEDICINE

## 2017-04-23 PROCEDURE — 99217 PR OBSERVATION CARE DISCHARGE: CPT | Mod: ,,, | Performed by: PHYSICIAN ASSISTANT

## 2017-04-23 PROCEDURE — 80069 RENAL FUNCTION PANEL: CPT

## 2017-04-23 PROCEDURE — 63600175 PHARM REV CODE 636 W HCPCS: Performed by: PHYSICIAN ASSISTANT

## 2017-04-23 PROCEDURE — 93279 PRGRMG DEV EVAL PM/LDLS PM: CPT | Mod: 26,,, | Performed by: INTERNAL MEDICINE

## 2017-04-23 PROCEDURE — 25000003 PHARM REV CODE 250: Performed by: PHYSICIAN ASSISTANT

## 2017-04-23 PROCEDURE — 85610 PROTHROMBIN TIME: CPT

## 2017-04-23 PROCEDURE — 93279 PRGRMG DEV EVAL PM/LDLS PM: CPT

## 2017-04-23 PROCEDURE — 36415 COLL VENOUS BLD VENIPUNCTURE: CPT

## 2017-04-23 PROCEDURE — G0378 HOSPITAL OBSERVATION PER HR: HCPCS

## 2017-04-23 PROCEDURE — 85025 COMPLETE CBC W/AUTO DIFF WBC: CPT

## 2017-04-23 PROCEDURE — 80162 ASSAY OF DIGOXIN TOTAL: CPT

## 2017-04-23 PROCEDURE — 84484 ASSAY OF TROPONIN QUANT: CPT

## 2017-04-23 RX ORDER — TIZANIDINE HYDROCHLORIDE 4 MG/1
1 CAPSULE, GELATIN COATED ORAL DAILY PRN
Qty: 60 CAPSULE | Refills: 0 | Status: SHIPPED | OUTPATIENT
Start: 2017-04-23 | End: 2017-06-16 | Stop reason: SDUPTHER

## 2017-04-23 RX ADMIN — DIGOXIN 0.12 MG: 125 TABLET ORAL at 09:04

## 2017-04-23 RX ADMIN — KETOROLAC TROMETHAMINE 1 DROP: 5 SOLUTION/ DROPS OPHTHALMIC at 09:04

## 2017-04-23 RX ADMIN — GABAPENTIN 300 MG: 300 CAPSULE ORAL at 05:04

## 2017-04-23 RX ADMIN — VITAMIN D, TAB 1000IU (100/BT) 1000 UNITS: 25 TAB at 09:04

## 2017-04-23 RX ADMIN — ATORVASTATIN CALCIUM 80 MG: 20 TABLET, FILM COATED ORAL at 09:04

## 2017-04-23 RX ADMIN — PREDNISOLONE ACETATE 1 DROP: 10 SUSPENSION/ DROPS OPHTHALMIC at 05:04

## 2017-04-23 RX ADMIN — LEVETIRACETAM 500 MG: 500 TABLET, FILM COATED ORAL at 09:04

## 2017-04-23 RX ADMIN — PREDNISONE 10 MG: 10 TABLET ORAL at 09:04

## 2017-04-23 RX ADMIN — SEVELAMER CARBONATE 800 MG: 800 TABLET, FILM COATED ORAL at 07:04

## 2017-04-23 NOTE — DISCHARGE SUMMARY
"[facility] Hospital Medicine  Discharge Summary      Patient Name: Sisi Medel  MRN: [id number]  Admission Date: 4/22/2017  Hospital Length of Stay: 0 days  Discharge Date and Time:  04/23/2017 2:33 PM  Attending Physician: No att. providers found   Discharging Provider: Huma Farris PA-C  Primary Care Provider: Carlos A Caputo MD  [facility] Medicine Team: [facility] HOSP MED E Huma Farris PA-C    HPI:   55 yo female with extensive past medical history including pulmonary HTN, Afib, HTN, ESRD on HD, and CHF presented to ED from dialysis after an episode of decreased level of consciousness. Patient reports she does not remember much of the event, but reports she was having lower extremity muscle spasms and then became nauseated and started feeling "out of it". She states she received an extra session of HD yesterday in addition to her regularly scheduled TTSa schedule this week. Per ED note, dialysis nurse Yanique states that patient became lethargic while nurse went to get emesis basin, and she turned down the dialysis rate. Patient did not rouse to verbal stimuli, did rouse to sternal rub but remained lethargic. BP remained stable throughout, HR WNL, . EMS arrived, patient steadily became more arousable and conversing prior to leaving in EMS. In the ED, patient reports she is feeling better than when she presented but not quite back to baseline, endorsing continued feeling of weakness. States she had some chest pain described as "someone pressing on it" and shortness of breath during dialysis but that this is common for her during dialysis. She endorses associated headache, sweats, palpitations. She no longer makes urine. Last BM this am. She is on home O2.     In the ED, orthostatics positive, CBC with hemoglobin 16.2, Cr at baseline, electrolytes WNL w/ HCO3 of 30 INR 1.8, Troponin 0.236, , CXR with mild interstitial edema. Patient given 500 cc NS bolus. Patient feeling " much better after PO fluids and tylenol. Admitted to observation for further evaluation and treatment.           * No surgery found *      Indwelling Lines/Drains at time of discharge:   Lines/Drains/Airways     Central Venous Catheter Line                 Hemodialysis Catheter 02/04/17 1315 right internal jugular 78 days          Drain                 Hemodialysis AV Graft Left upper arm -- days         Hemodialysis AV Graft Left upper arm -- days         Hemodialysis AV Fistula 11/27/16 0920 Left upper arm 147 days              Hospital Course:   57 yo female admitted to observation for further evaluation and treatment of decreased level of consciousness. In the ED, orthostatics positive, CBC with hemoglobin 16.2, Cr at baseline, electrolytes WNL w/ HCO3 of 30 INR 1.8, Troponin 0.236, , CXR with mild interstitial edema. Patient given 500 cc NS bolus. Patient with much imrpovement after PO fluids and tylenol. Cardiology consulted, episode likely due to low bp/orthostatic hypotension, interrogated BiV PPM with no evidence of rhythmogenic event, patient to continue outpatient follow up with EP as scheduled for AVN ablation. Orthostatics improved, patient reports feeling back to baseline on day of discharge. Patient has previously scheduled follow up with coumadin clinic and cardiology clinic in the coming week. Episode during HD likely secondary to hypotension in setting of extra volume removed with additional HD session this week. Patient stable for discharge home today to continue close outpatient follow up.      Consults:   Consults         Status Ordering Provider     Inpatient consult to Cardiology  Once     Provider:  (Not yet assigned)    JOLIE Blair          Significant Diagnostic Studies: Radiology: X-Ray: CXR: X-Ray Chest 1 View (CXR): No results found for this visit on 04/22/17.    Pending Diagnostic Studies:     None        Final Active Diagnoses:    Diagnosis Date Noted POA     "PRINCIPAL PROBLEM:  Syncope [R55] 04/22/2017 Yes    ESRD on hemodialysis [N18.6, Z99.2] 02/23/2016 Not Applicable     Chronic    Persistent atrial fibrillation [I48.1] 04/06/2015 Yes    Chronic combined systolic and diastolic heart failure [I50.42] 06/14/2013 Yes     Chronic    Pulmonary sarcoidosis [D86.0] 03/19/2013 Yes     Chronic    Seizure disorder [G40.909] 03/19/2013 Yes     Chronic      Problems Resolved During this Admission:    Diagnosis Date Noted Date Resolved POA      * Syncope  - Patient presented after decreased level of consciousness during HD. Patient received extra sessions of dialysis this week. Hemoconcentrated on admission (Hemoglobin 16.2). Troponin elevated. Chest pain and headache resolved prior to admission.   - Orthostatics positive in ED. Repeat negative.   - Likely secondary to volume depletion in setting of extra HD sessions this week  - Patient with improvement in symptoms after 500 cc bolus NS in ED  - Monitor on telemetry. Hold off on further fluids in setting of ESRD on HD.    - Cardiology consulted, suspect episode secondary to orthostatic hypotension/low BP. BiV PPM interrogated with no evidence of rhythmogenic event. Patient to continue outpatient follow up with EP as planned for AVN ablation.  - Patient reports feeling at baseline, requesting discharge home today. Plan to keep cardiology follow up appointment scheduled this week.       ESRD on hemodialysis  - Patient on TTSa schedule as outpatient, last HD 4/22. Received extra session of HD this week, patient reports she was told she "needed it" but unsure of other specifics  - Renal function panel daily  - Will consult nephrology if patient with changes in renal function panel/indication for HD prior to next scheduled session   - Patient to resume regular outpatient HD schedule on discharge      Persistent atrial fibrillation  - EKG with a flutter. Continue digoxin, warfarin. Digoxin level 0.9. Currently rate controlled. " See cardiology recommendations as above.   - INR 1.7 today. Patient has follow up with coumadin clinic 4/26. Will defer further dose adjustment to this appointment, per chart review INR supratherapeutic on last appointment.       Chronic combined systolic and diastolic heart failure  - Last echo 6/2016 with EF 50, diastolic dysfunction   - Patient to keep cardiology follow up this week      Pulmonary sarcoidosis  - Continue prednisone 10 mg daily      Seizure disorder  - Continue Keppra 500 mg BID        Discharged Condition: stable    Disposition: Home or Self Care    Follow Up:  Follow-up Information     Follow up with Carlos A Caputo MD.    Specialty:  Internal Medicine    Why:  Please follow up with Dr. Rico as scheduled 4/26/17.     Contact information:    [address]  [place] [address]  [phone]          Follow up with COUMADIN, LAB.    Specialty:  Lab    Why:  Please follow up with coumadin clinic as previously scheduled 4/26/17.          Follow up with Bladimir Adorno MD.    Specialties:  Cardiology, Electrophysiology    Why:  Please follow up with Dr. Adorno as scheduled 4/26/17.    Contact information:    [address] [facility] [address]  [phone]          Patient Instructions:     Diet general     Activity as tolerated     Call MD for:  persistent dizziness, light-headedness, or visual disturbances     Call MD for:  increased confusion or weakness       Medications:  Reconciled Home Medications:   Discharge Medication List as of 4/23/2017 12:15 PM      CONTINUE these medications which have NOT CHANGED    Details   ACZONE 5 % topical gel Apply to face every morning, Starting 1/6/2016, Until Discontinued, Historical Med      ammonium lactate (LAC-HYDRIN) 12 % lotion Apply topically as needed (to legs). , Starting 1/5/2016, Until Discontinued, Historical Med      atorvastatin (LIPITOR) 80 MG tablet TAKE 1 TABLET(80 MG) BY MOUTH EVERY DAY, Normal      cholecalciferol, vitamin D3,  1,000 unit capsule Take 1 capsule (1,000 Units total) by mouth once daily., Starting 3/8/2017, Until Discontinued, No Print      digoxin (LANOXIN) 125 mcg tablet Take 1 tablet (0.125 mg total) by mouth every other day., Starting 4/8/2017, Until Sun 4/8/18, Print      docusate sodium (COLACE) 100 MG capsule Take 100 mg by mouth 2 (two) times daily as needed for Constipation., Until Discontinued, Historical Med      econazole nitrate 1 % cream Apply topically 2 (two) times daily. , Starting 1/10/2017, Until Discontinued, Historical Med      fludrocortisone (FLORINEF) 0.1 mg Tab Take 100 mcg by mouth 2 (two) times daily., Until Discontinued, Historical Med      gabapentin (NEURONTIN) 300 MG capsule TAKE ONE CAPSULE BY MOUTH THREE TIMES DAILY, Normal      ketorolac 0.5% (ACULAR) 0.5 % Drop instill one drop into both eyes every morning, Historical Med      lactulose (CHRONULAC) 20 gram/30 mL Soln Take 15 mLs (10 g total) by mouth 3 (three) times daily as needed (for constipation)., Starting 9/1/2016, Until Discontinued, Normal      levetiracetam (KEPPRA) 500 MG Tab Take 1 tablet (500 mg total) by mouth 2 (two) times daily., Starting 4/7/2017, Until Discontinued, Normal      !! midodrine (PROAMATINE) 5 MG Tab Take 2 tablets (10 mg total) by mouth every Mon, Wed, Fri, Sun., Starting 2/19/2017, Until Mon 2/19/18, Normal      !! midodrine (PROAMATINE) 5 MG Tab Take 3 tablets (15 mg total) by mouth every Tues, Thurs, Sat. Take 2 tablets (10 mg total) by mouth on non dialysis days., Starting 2/21/2017, Until Discontinued, No Print      omeprazole (PRILOSEC) 20 MG capsule Take 1 capsule (20 mg total) by mouth once daily., Starting 1/20/2017, Until Sat 1/20/18, Normal      prednisoLONE acetate (PRED FORTE) 1 % DrpS INSTILL ONE DROP INTO  LEFT EYE THREE TIMES A DAY, Historical Med      predniSONE (DELTASONE) 10 MG tablet Takes one 10 mg tablet every morning, Normal      RENVELA 800 mg Tab TAKE 1 TABLET BY MOUTH THREE TIMES DAILY  WITH MEALS, Normal      warfarin (COUMADIN) 5 MG tablet Take 1 tablet (5 mg total) by mouth Daily. Per Coumadin Clinic, Starting 4/3/2017, Until Discontinued, Normal      diclofenac sodium 1 % Gel Apply 2 g topically every 8 (eight) hours as needed., Starting 2/16/2017, Until Sat 4/8/17, Normal      inulin-sorbitol 2 gram Chew Take 2 tablets by mouth 2 (two) times daily., Starting 2/11/2017, Until Discontinued, OTC      polyethylene glycol (GLYCOLAX) 17 gram/dose powder Take 17 g by mouth once daily. Dissolve in juice., Starting 2/11/2017, Until Discontinued, Normal       !! - Potential duplicate medications found. Please discuss with provider.      STOP taking these medications       clopidogrel (PLAVIX) 75 mg tablet Comments:   Reason for Stopping:             Time spent on the discharge of patient: 30 minutes    Huma Farris PA-C  Department of Hospital Medicine  [facility]  Staff: Dr. Leigh

## 2017-04-23 NOTE — ASSESSMENT & PLAN NOTE
- EKG with a flutter. Continue digoxin, warfarin. Digoxin level 0.9. Currently rate controlled. See cardiology recommendations as above.   - INR 1.7 today. Patient has follow up with coumadin clinic 4/26. Will defer further dose adjustment to this appointment, per chart review INR supratherapeutic on last appointment.

## 2017-04-23 NOTE — ASSESSMENT & PLAN NOTE
- Patient presented after decreased level of consciousness during HD. Patient received extra sessions of dialysis this week. Hemoconcentrated on admission (Hemoglobin 16.2). Troponin elevated. Chest pain and headache resolved prior to admission.   - Orthostatics positive in ED. Repeat negative.   - Likely secondary to volume depletion in setting of extra HD sessions this week  - Patient with improvement in symptoms after 500 cc bolus NS in ED  - Monitor on telemetry. Hold off on further fluids in setting of ESRD on HD.    - Cardiology consulted, suspect episode secondary to orthostatic hypotension/low BP. BiV PPM interrogated with no evidence of rhythmogenic event. Patient to continue outpatient follow up with EP as planned for AVN ablation.  - Patient reports feeling at baseline, requesting discharge home today. Plan to keep cardiology follow up appointment scheduled this week.

## 2017-04-23 NOTE — PLAN OF CARE
Problem: Patient Care Overview  Goal: Plan of Care Review  Outcome: Ongoing (interventions implemented as appropriate)  poc updated. Pt free from falls. Safety maintained. Pain scale verbalized and understood. No distress noted. n

## 2017-04-23 NOTE — SUBJECTIVE & OBJECTIVE
Past Medical History:   Diagnosis Date    Anemia in ESRD (end-stage renal disease) 3/7/2016    Anticoagulant long-term use     Cervical radiculopathy 2015    CHF (congestive heart failure)     Chronic combined systolic and diastolic heart failure 2013    EF 20% , improved with Medical therapy, negative PET     Chronic respiratory failure with hypoxia 2013    On home oxygen at 2-5 liters    Closed fracture of proximal end of right fibula 2016    Complications due to renal dialysis device, implant, and graft     DDD (degenerative disc disease), lumbar 2015    ESRD on hemodialysis 2016    Essential hypertension     Gout     Lateral meniscal tear 2016    Lumbar stenosis 2015    Paroxysmal atrial fibrillation 2014    Not on anticoagulation    Peripheral neuropathy 2013    Personal history of gastric ulcer 3/19/2013    Sarcoidosis, lung 2009    Diagnosed in  with ocular manifestation, on 4L home O2 and PO steroids    Secondary pulmonary hypertension 2015    Seizure disorder 3/19/2013    Seizures     Shingles 2013    Thyroid disease        Past Surgical History:   Procedure Laterality Date    ABDOMINAL SURGERY       SECTION      x2    OTHER SURGICAL HISTORY      loop recorder implant    VASCULAR SURGERY         Review of patient's allergies indicates:   Allergen Reactions    Bactrim [sulfamethoxazole-trimethoprim] Other (See Comments)     Causes renal failure    Nsaids (non-steroidal anti-inflammatory drug) Other (See Comments)     Renal failure       No current facility-administered medications on file prior to encounter.      Current Outpatient Prescriptions on File Prior to Encounter   Medication Sig    ACZONE 5 % topical gel Apply to face every morning    ammonium lactate (LAC-HYDRIN) 12 % lotion Apply topically as needed (to legs).     atorvastatin (LIPITOR) 80 MG tablet TAKE 1 TABLET(80 MG) BY MOUTH EVERY  DAY    cholecalciferol, vitamin D3, 1,000 unit capsule Take 1 capsule (1,000 Units total) by mouth once daily.    digoxin (LANOXIN) 125 mcg tablet Take 1 tablet (0.125 mg total) by mouth every other day.    docusate sodium (COLACE) 100 MG capsule Take 100 mg by mouth 2 (two) times daily as needed for Constipation.    econazole nitrate 1 % cream Apply topically 2 (two) times daily.     fludrocortisone (FLORINEF) 0.1 mg Tab Take 100 mcg by mouth 2 (two) times daily.    gabapentin (NEURONTIN) 300 MG capsule TAKE ONE CAPSULE BY MOUTH THREE TIMES DAILY    ketorolac 0.5% (ACULAR) 0.5 % Drop instill one drop into both eyes every morning    lactulose (CHRONULAC) 20 gram/30 mL Soln Take 15 mLs (10 g total) by mouth 3 (three) times daily as needed (for constipation).    levetiracetam (KEPPRA) 500 MG Tab Take 1 tablet (500 mg total) by mouth 2 (two) times daily.    midodrine (PROAMATINE) 5 MG Tab Take 2 tablets (10 mg total) by mouth every Mon, Wed, Fri, Sun.    midodrine (PROAMATINE) 5 MG Tab Take 3 tablets (15 mg total) by mouth every Tues, Thurs, Sat. Take 2 tablets (10 mg total) by mouth on non dialysis days.    omeprazole (PRILOSEC) 20 MG capsule Take 1 capsule (20 mg total) by mouth once daily.    prednisoLONE acetate (PRED FORTE) 1 % DrpS INSTILL ONE DROP INTO  LEFT EYE THREE TIMES A DAY    predniSONE (DELTASONE) 10 MG tablet Takes one 10 mg tablet every morning (Patient taking differently: Takes one 10 mg tablet every morning with breakfast)    RENVELA 800 mg Tab TAKE 1 TABLET BY MOUTH THREE TIMES DAILY WITH MEALS    warfarin (COUMADIN) 5 MG tablet Take 1 tablet (5 mg total) by mouth Daily. [facility]    diclofenac sodium 1 % Gel Apply 2 g topically every 8 (eight) hours as needed.    inulin-sorbitol 2 gram Chew Take 2 tablets by mouth 2 (two) times daily.    polyethylene glycol (GLYCOLAX) 17 gram/dose powder Take 17 g by mouth once daily. Dissolve in juice.    [DISCONTINUED] clopidogrel  (PLAVIX) 75 mg tablet Take 1 tablet (75 mg total) by mouth once daily.     Family History     Problem Relation (Age of Onset)    Coronary artery disease Father    Diabetes Mother, Father, Maternal Grandmother    Hypertension Mother, Father    Kidney failure Mother    Lupus Sister        Social History Main Topics    Smoking status: Former Smoker     Packs/day: 0.50     Years: 10.00     Types: Cigarettes     Quit date: 4/22/1994    Smokeless tobacco: Never Used    Alcohol use No    Drug use: No    Sexual activity: Not on file     Review of Systems   Constitution: Negative.   HENT: Negative.    Eyes: Negative.    Cardiovascular: Positive for irregular heartbeat, near-syncope and palpitations. Negative for chest pain, claudication, cyanosis, dyspnea on exertion, leg swelling, orthopnea, paroxysmal nocturnal dyspnea and syncope.   Respiratory: Negative for cough, hemoptysis, shortness of breath, sleep disturbances due to breathing, snoring, sputum production and wheezing.    Endocrine: Negative.  Negative for cold intolerance.   Hematologic/Lymphatic: Negative.    Skin: Negative.    Musculoskeletal: Negative.    Gastrointestinal: Negative.    Neurological: Negative.    Psychiatric/Behavioral: Negative.      Objective:     Vital Signs (Most Recent):  Temp: 98.4 °F (36.9 °C) (04/23/17 1200)  Pulse: 80 (04/23/17 1200)  Resp: 18 (04/23/17 1200)  BP: 116/81 (04/23/17 1200)  SpO2: 97 % (04/23/17 1200) Vital Signs (24h Range):  Temp:  [96.4 °F (35.8 °C)-98.4 °F (36.9 °C)] 98.4 °F (36.9 °C)  Pulse:  [] 80  Resp:  [18-20] 18  SpO2:  [94 %-99 %] 97 %  BP: (110-158)/() 116/81     Weight: 120.8 kg (266 lb 5.1 oz)  Body mass index is 40.49 kg/(m^2).    SpO2: 97 %  O2 Device (Oxygen Therapy): nasal cannula    No intake or output data in the 24 hours ending 04/23/17 1305    Lines/Drains/Airways     Central Venous Catheter Line                 Hemodialysis Catheter 02/04/17 1315 right internal jugular 77 days           Drain                 Hemodialysis AV Graft Left upper arm -- days         Hemodialysis AV Graft Left upper arm -- days         Hemodialysis AV Fistula 11/27/16 0920 Left upper arm 147 days          Peripheral Intravenous Line                 Peripheral IV - Single Lumen 04/22/17 Right Wrist 1 day                Physical Exam   Constitutional: She is oriented to person, place, and time. She appears well-developed and well-nourished. No distress.   HENT:   Head: Normocephalic and atraumatic.   Eyes: EOM are normal. Pupils are equal, round, and reactive to light.   Neck: Normal range of motion. Neck supple.   Cardiovascular: Normal rate, normal heart sounds and intact distal pulses.  Exam reveals no gallop and no friction rub.    No murmur heard.  Irregularly irregular rhythm   Pulmonary/Chest: Effort normal and breath sounds normal. No respiratory distress. She has no wheezes. She has no rales. She exhibits no tenderness.   Abdominal: Soft. Bowel sounds are normal.   Musculoskeletal: She exhibits no edema or tenderness.   Neurological: She is alert and oriented to person, place, and time.   Skin: Skin is warm and dry. She is not diaphoretic.   Psychiatric: She has a normal mood and affect. Her behavior is normal. Judgment and thought content normal.   Nursing note and vitals reviewed.      Significant Labs:   ABG: No results for input(s): PH, PCO2, HCO3, POCSATURATED, BE in the last 48 hours., Blood Culture: No results for input(s): LABBLOO in the last 48 hours., BMP:   Recent Labs  Lab 04/22/17  1140 04/23/17  0541   * 140*    135*   K 3.9 5.2*   CL 90* 91*   CO2 30* 25   BUN 18 42*   CREATININE 4.3* 6.2*   CALCIUM 10.4 9.9   , CMP   Recent Labs  Lab 04/22/17  1140 04/23/17  0541    135*   K 3.9 5.2*   CL 90* 91*   CO2 30* 25   * 140*   BUN 18 42*   CREATININE 4.3* 6.2*   CALCIUM 10.4 9.9   PROT 8.8*  --    ALBUMIN 3.7 3.2*   BILITOT 0.7  --    ALKPHOS 153*  --    AST 25  --    ALT 22  --     ANIONGAP 17* 19*   ESTGFRAFRICA 12.6* 8.1*   EGFRNONAA 10.9* 7.0*   , CBC   Recent Labs  Lab 04/22/17  1140 04/23/17  0542   WBC 11.72 9.81   HGB 16.2* 14.2   HCT 52.0* 44.1    228   , INR   Recent Labs  Lab 04/22/17  1140 04/23/17  0541   INR 1.8* 1.7*   , Lipid Panel No results for input(s): CHOL, HDL, LDLCALC, TRIG, CHOLHDL in the last 48 hours. and Troponin   Recent Labs  Lab 04/22/17  1742 04/23/17  0025 04/23/17  0541   TROPONINI 0.238* 0.243* 0.249*       Significant Imaging: EKG: Reviewed

## 2017-04-23 NOTE — ASSESSMENT & PLAN NOTE
- BiV PPM interrogated with no evidence of rhythmogenic event  - Suspect orthostatic hypotension vs muscle spasm driven vasovagal syncope  - Continue outpatient follow up with EP as planned for AVN ablation

## 2017-04-23 NOTE — CONSULTS
"[facility]  Cardiology  Consult Note    Patient Name: Sisi Medel  MRN: [id number]  Admission Date: 4/22/2017  Hospital Length of Stay: 0 days  Code Status: Full Code   Attending Provider: Evan Leigh MD   Consulting Provider: Simeon Koo MD  Primary Care Physician: Carlos A Caputo MD  Principal Problem:Syncope    Patient information was obtained from patient, past medical records and ER records.     Inpatient consult to Cardiology  Consult performed by: SIMEON KOO  Consult ordered by: EVAN LEIGH        Subjective:     Chief Complaint:  Syncope     HPI:   56F with persistent atrial fibrillation, sarcoidosis of lung on home O2, secondary pulmonary hypertension, HTN, ESRD on HD TThSat admitted following a syncopal episode during dialysis on Saturday. The patient was undergoing her regularly scheduled dialysis, became suffered muscle spasms, nauseated, grew lethargic and difficult to rouse with sternal rub. The patient reports "feeling out of it" and hearing only distant voices. Reportedly, HR WNL, BP WNL, no clear etiology found on initial evaluation. Transported to [facility] [place] where she was found to be orthostatically hypotensive, hemoconcentrated, slight contraction alkalosis. Fluid resuscitated with NS bolus with subsequent symptomatic improvement. Admitted to hospital medicine for observation given risk factors of A-fib.     Cardiology consulted for, "elevated troponin, a flutter on EKG, HR elevated. Hx of afib. Chest pain and LOC in HD today."    The patient has a prior history of persistent atrial fibrillation, followed by Cardiology Kyree. BiV PPM placed in Jan 2017 for Afib. Failed treatment with amiodarone. Presently on digoxin. Plan for AV aye ablation as patient is not a candidate for Afib ablation due to co morbidities. Upcoming appointment on 4/28/17 with Kyree.          Past Medical History:   Diagnosis Date    Anemia in ESRD (end-stage renal disease) " 3/7/2016    Anticoagulant long-term use     Cervical radiculopathy 2015    CHF (congestive heart failure)     Chronic combined systolic and diastolic heart failure 2013    EF 20% , improved with Medical therapy, negative PET     Chronic respiratory failure with hypoxia 2013    On home oxygen at 2-5 liters    Closed fracture of proximal end of right fibula 2016    Complications due to renal dialysis device, implant, and graft     DDD (degenerative disc disease), lumbar 2015    ESRD on hemodialysis 2016    Essential hypertension     Gout     Lateral meniscal tear 2016    Lumbar stenosis 2015    Paroxysmal atrial fibrillation 2014    Not on anticoagulation    Peripheral neuropathy 2013    Personal history of gastric ulcer 3/19/2013    Sarcoidosis, lung 2009    Diagnosed in  with ocular manifestation, on 4L home O2 and PO steroids    Secondary pulmonary hypertension 2015    Seizure disorder 3/19/2013    Seizures     Shingles 2013    Thyroid disease        Past Surgical History:   Procedure Laterality Date    ABDOMINAL SURGERY       SECTION      x2    OTHER SURGICAL HISTORY      loop recorder implant    VASCULAR SURGERY         Review of patient's allergies indicates:   Allergen Reactions    Bactrim [sulfamethoxazole-trimethoprim] Other (See Comments)     Causes renal failure    Nsaids (non-steroidal anti-inflammatory drug) Other (See Comments)     Renal failure       No current facility-administered medications on file prior to encounter.      Current Outpatient Prescriptions on File Prior to Encounter   Medication Sig    ACZONE 5 % topical gel Apply to face every morning    ammonium lactate (LAC-HYDRIN) 12 % lotion Apply topically as needed (to legs).     atorvastatin (LIPITOR) 80 MG tablet TAKE 1 TABLET(80 MG) BY MOUTH EVERY DAY    cholecalciferol, vitamin D3, 1,000 unit capsule Take 1 capsule (1,000 Units  total) by mouth once daily.    digoxin (LANOXIN) 125 mcg tablet Take 1 tablet (0.125 mg total) by mouth every other day.    docusate sodium (COLACE) 100 MG capsule Take 100 mg by mouth 2 (two) times daily as needed for Constipation.    econazole nitrate 1 % cream Apply topically 2 (two) times daily.     fludrocortisone (FLORINEF) 0.1 mg Tab Take 100 mcg by mouth 2 (two) times daily.    gabapentin (NEURONTIN) 300 MG capsule TAKE ONE CAPSULE BY MOUTH THREE TIMES DAILY    ketorolac 0.5% (ACULAR) 0.5 % Drop instill one drop into both eyes every morning    lactulose (CHRONULAC) 20 gram/30 mL Soln Take 15 mLs (10 g total) by mouth 3 (three) times daily as needed (for constipation).    levetiracetam (KEPPRA) 500 MG Tab Take 1 tablet (500 mg total) by mouth 2 (two) times daily.    midodrine (PROAMATINE) 5 MG Tab Take 2 tablets (10 mg total) by mouth every Mon, Wed, Fri, Sun.    midodrine (PROAMATINE) 5 MG Tab Take 3 tablets (15 mg total) by mouth every Tues, Thurs, Sat. Take 2 tablets (10 mg total) by mouth on non dialysis days.    omeprazole (PRILOSEC) 20 MG capsule Take 1 capsule (20 mg total) by mouth once daily.    prednisoLONE acetate (PRED FORTE) 1 % DrpS INSTILL ONE DROP INTO  LEFT EYE THREE TIMES A DAY    predniSONE (DELTASONE) 10 MG tablet Takes one 10 mg tablet every morning (Patient taking differently: Takes one 10 mg tablet every morning with breakfast)    RENVELA 800 mg Tab TAKE 1 TABLET BY MOUTH THREE TIMES DAILY WITH MEALS    warfarin (COUMADIN) 5 MG tablet Take 1 tablet (5 mg total) by mouth Daily. [facility]    diclofenac sodium 1 % Gel Apply 2 g topically every 8 (eight) hours as needed.    inulin-sorbitol 2 gram Chew Take 2 tablets by mouth 2 (two) times daily.    polyethylene glycol (GLYCOLAX) 17 gram/dose powder Take 17 g by mouth once daily. Dissolve in juice.    [DISCONTINUED] clopidogrel (PLAVIX) 75 mg tablet Take 1 tablet (75 mg total) by mouth once daily.     Family  History     Problem Relation (Age of Onset)    Coronary artery disease Father    Diabetes Mother, Father, Maternal Grandmother    Hypertension Mother, Father    Kidney failure Mother    Lupus Sister        Social History Main Topics    Smoking status: Former Smoker     Packs/day: 0.50     Years: 10.00     Types: Cigarettes     Quit date: 4/22/1994    Smokeless tobacco: Never Used    Alcohol use No    Drug use: No    Sexual activity: Not on file     Review of Systems   Constitution: Negative.   HENT: Negative.    Eyes: Negative.    Cardiovascular: Positive for irregular heartbeat, near-syncope and palpitations. Negative for chest pain, claudication, cyanosis, dyspnea on exertion, leg swelling, orthopnea, paroxysmal nocturnal dyspnea and syncope.   Respiratory: Negative for cough, hemoptysis, shortness of breath, sleep disturbances due to breathing, snoring, sputum production and wheezing.    Endocrine: Negative.  Negative for cold intolerance.   Hematologic/Lymphatic: Negative.    Skin: Negative.    Musculoskeletal: Negative.    Gastrointestinal: Negative.    Neurological: Negative.    Psychiatric/Behavioral: Negative.      Objective:     Vital Signs (Most Recent):  Temp: 98.4 °F (36.9 °C) (04/23/17 1200)  Pulse: 80 (04/23/17 1200)  Resp: 18 (04/23/17 1200)  BP: 116/81 (04/23/17 1200)  SpO2: 97 % (04/23/17 1200) Vital Signs (24h Range):  Temp:  [96.4 °F (35.8 °C)-98.4 °F (36.9 °C)] 98.4 °F (36.9 °C)  Pulse:  [] 80  Resp:  [18-20] 18  SpO2:  [94 %-99 %] 97 %  BP: (110-158)/() 116/81     Weight: 120.8 kg (266 lb 5.1 oz)  Body mass index is 40.49 kg/(m^2).    SpO2: 97 %  O2 Device (Oxygen Therapy): nasal cannula    No intake or output data in the 24 hours ending 04/23/17 1305    Lines/Drains/Airways     Central Venous Catheter Line                 Hemodialysis Catheter 02/04/17 1315 right internal jugular 77 days          Drain                 Hemodialysis AV Graft Left upper arm -- days          Hemodialysis AV Graft Left upper arm -- days         Hemodialysis AV Fistula 11/27/16 0920 Left upper arm 147 days          Peripheral Intravenous Line                 Peripheral IV - Single Lumen 04/22/17 Right Wrist 1 day                Physical Exam   Constitutional: She is oriented to person, place, and time. She appears well-developed and well-nourished. No distress.   HENT:   Head: Normocephalic and atraumatic.   Eyes: EOM are normal. Pupils are equal, round, and reactive to light.   Neck: Normal range of motion. Neck supple.   Cardiovascular: Normal rate, normal heart sounds and intact distal pulses.  Exam reveals no gallop and no friction rub.    No murmur heard.  Irregularly irregular rhythm   Pulmonary/Chest: Effort normal and breath sounds normal. No respiratory distress. She has no wheezes. She has no rales. She exhibits no tenderness.   Abdominal: Soft. Bowel sounds are normal.   Musculoskeletal: She exhibits no edema or tenderness.   Neurological: She is alert and oriented to person, place, and time.   Skin: Skin is warm and dry. She is not diaphoretic.   Psychiatric: She has a normal mood and affect. Her behavior is normal. Judgment and thought content normal.   Nursing note and vitals reviewed.      Significant Labs:   ABG: No results for input(s): PH, PCO2, HCO3, POCSATURATED, BE in the last 48 hours., Blood Culture: No results for input(s): LABBLOO in the last 48 hours., BMP:   Recent Labs  Lab 04/22/17  1140 04/23/17  0541   * 140*    135*   K 3.9 5.2*   CL 90* 91*   CO2 30* 25   BUN 18 42*   CREATININE 4.3* 6.2*   CALCIUM 10.4 9.9   , CMP   Recent Labs  Lab 04/22/17  1140 04/23/17  0541    135*   K 3.9 5.2*   CL 90* 91*   CO2 30* 25   * 140*   BUN 18 42*   CREATININE 4.3* 6.2*   CALCIUM 10.4 9.9   PROT 8.8*  --    ALBUMIN 3.7 3.2*   BILITOT 0.7  --    ALKPHOS 153*  --    AST 25  --    ALT 22  --    ANIONGAP 17* 19*   ESTGFRAFRICA 12.6* 8.1*   EGFRNONAA 10.9* 7.0*   , CBC    Recent Labs  Lab 04/22/17  1140 04/23/17  0542   WBC 11.72 9.81   HGB 16.2* 14.2   HCT 52.0* 44.1    228   , INR   Recent Labs  Lab 04/22/17  1140 04/23/17  0541   INR 1.8* 1.7*   , Lipid Panel No results for input(s): CHOL, HDL, LDLCALC, TRIG, CHOLHDL in the last 48 hours. and Troponin   Recent Labs  Lab 04/22/17  1742 04/23/17  0025 04/23/17  0541   TROPONINI 0.238* 0.243* 0.249*       Significant Imaging: EKG: Reviewed    Assessment and Plan:     Persistent atrial fibrillation  - BiV PPM interrogated with no evidence of rhythmogenic event  - Suspect orthostatic hypotension vs muscle spasm driven vasovagal syncope  - Continue outpatient follow up with EP as planned for AVN ablation      VTE Risk Mitigation         Ordered     warfarin (COUMADIN) tablet 5 mg  Daily     Route:  Oral        04/22/17 1549     Medium Risk of VTE  Once      04/22/17 1549     Place sequential compression device  Until discontinued      04/22/17 1549     Place GUICHO hose  Until discontinued      04/22/17 1549     Place sequential compression device  Until discontinued      04/22/17 1418          Thank you for your consult. I will sign off. Please contact us if you have any additional questions.    Yonathan Galvin MD  Cardiology   [facility]

## 2017-04-23 NOTE — PLAN OF CARE
Problem: Patient Care Overview  Goal: Plan of Care Review  Outcome: Ongoing (interventions implemented as appropriate)  Plan of care reviewed with patient. No distress noted at this time. Cardiac monitoring maintained. Fall precautions implemented with bed in lowest position wheels locked and call bell within reach. Lt arm fistula precautions maintained. Will continue to monitor.

## 2017-04-23 NOTE — ASSESSMENT & PLAN NOTE
- Last echo 6/2016 with EF 50, diastolic dysfunction   - Patient to keep cardiology follow up this week

## 2017-04-23 NOTE — ASSESSMENT & PLAN NOTE
"- Patient on TTSa schedule as outpatient, last HD 4/22. Received extra session of HD this week, patient reports she was told she "needed it" but unsure of other specifics  - Renal function panel daily  - Will consult nephrology if patient with changes in renal function panel/indication for HD prior to next scheduled session   - Patient to resume regular outpatient HD schedule on discharge    "

## 2017-04-23 NOTE — PROGRESS NOTES
Discharge instructions given to patient. Pt verbalizes understanding of medications and follow up appts. No distress noted at this time. Peripheral IV removed intact. Cardiac monitor removed. Pt has personal O2 tank at bedside. Pt wheeled out via wheelchair to family.

## 2017-04-23 NOTE — PROGRESS NOTES
Pacemaker interrogated at bedside.   Patient has [employer] biventricular pacemaker (LV and RV leads only).   Mode: VVI 60  Patient paced 4.2% of time.   Known permanent atrial fibrillation with multiple episodes of high ventricular rate noted, last 4/21/2017.

## 2017-04-24 DIAGNOSIS — M62.838 MUSCLE SPASM: ICD-10-CM

## 2017-04-26 ENCOUNTER — ANTI-COAG VISIT (OUTPATIENT)
Dept: CARDIOLOGY | Facility: CLINIC | Age: 57
End: 2017-04-26
Payer: MEDICARE

## 2017-04-26 DIAGNOSIS — I48.19 PERSISTENT ATRIAL FIBRILLATION: ICD-10-CM

## 2017-04-26 DIAGNOSIS — Z79.01 LONG-TERM (CURRENT) USE OF ANTICOAGULANTS: Primary | ICD-10-CM

## 2017-04-26 LAB — INR PPP: 2 (ref 2–3)

## 2017-04-26 PROCEDURE — 99211 OFF/OP EST MAY X REQ PHY/QHP: CPT | Mod: 25,S$GLB,,

## 2017-04-26 PROCEDURE — 85610 PROTHROMBIN TIME: CPT | Mod: QW,S$GLB,,

## 2017-04-26 NOTE — PROGRESS NOTES
Patient was in the ED 4/21 for a syncope episode following dialysis. She was discharged home in stable condition and feels well today. No changes in medications occurred that would affect her INR. See calendar for INR and dosing while inpatient. She continued 5mg daily since discharge and will continue this dose until follow-up. I advised her to contact us with any changes or problems. I have reviewed the student's initial findings and agree with their assessment.  I have personally spoken with and assessed the patient in clinic to devise care plan.

## 2017-04-28 ENCOUNTER — OFFICE VISIT (OUTPATIENT)
Dept: INTERNAL MEDICINE | Facility: CLINIC | Age: 57
End: 2017-04-28
Payer: MEDICARE

## 2017-04-28 ENCOUNTER — HOSPITAL ENCOUNTER (OUTPATIENT)
Dept: CARDIOLOGY | Facility: CLINIC | Age: 57
Discharge: HOME | End: 2017-04-28
Payer: MEDICARE

## 2017-04-28 ENCOUNTER — OFFICE VISIT (OUTPATIENT)
Dept: ELECTROPHYSIOLOGY | Facility: CLINIC | Age: 57
End: 2017-04-28
Payer: MEDICARE

## 2017-04-28 VITALS
SYSTOLIC BLOOD PRESSURE: 83 MMHG | DIASTOLIC BLOOD PRESSURE: 48 MMHG | HEART RATE: 92 BPM | BODY MASS INDEX: 40.32 KG/M2 | HEIGHT: 68 IN | WEIGHT: 266 LBS

## 2017-04-28 VITALS
BODY MASS INDEX: 40.43 KG/M2 | WEIGHT: 266.75 LBS | HEART RATE: 80 BPM | OXYGEN SATURATION: 96 % | HEIGHT: 68 IN | DIASTOLIC BLOOD PRESSURE: 58 MMHG | SYSTOLIC BLOOD PRESSURE: 90 MMHG

## 2017-04-28 DIAGNOSIS — G63 POLYNEUROPATHY ASSOCIATED WITH UNDERLYING DISEASE: ICD-10-CM

## 2017-04-28 DIAGNOSIS — D86.0 PULMONARY SARCOIDOSIS: Primary | Chronic | ICD-10-CM

## 2017-04-28 DIAGNOSIS — N18.6 ESRD ON HEMODIALYSIS: Chronic | ICD-10-CM

## 2017-04-28 DIAGNOSIS — G40.909 SEIZURE DISORDER: Chronic | ICD-10-CM

## 2017-04-28 DIAGNOSIS — I48.19 PERSISTENT ATRIAL FIBRILLATION: ICD-10-CM

## 2017-04-28 DIAGNOSIS — I50.42 CHRONIC COMBINED SYSTOLIC AND DIASTOLIC HEART FAILURE: Chronic | ICD-10-CM

## 2017-04-28 DIAGNOSIS — N18.6 ESRD ON HEMODIALYSIS: Primary | Chronic | ICD-10-CM

## 2017-04-28 DIAGNOSIS — Z99.2 ESRD ON HEMODIALYSIS: Primary | Chronic | ICD-10-CM

## 2017-04-28 DIAGNOSIS — Z99.2 ESRD ON HEMODIALYSIS: Chronic | ICD-10-CM

## 2017-04-28 DIAGNOSIS — B02.29 PHN (POSTHERPETIC NEURALGIA): ICD-10-CM

## 2017-04-28 DIAGNOSIS — D86.0 PULMONARY SARCOIDOSIS: Chronic | ICD-10-CM

## 2017-04-28 DIAGNOSIS — I48.0 PAF (PAROXYSMAL ATRIAL FIBRILLATION): ICD-10-CM

## 2017-04-28 DIAGNOSIS — M85.80 OSTEOPENIA DETERMINED BY X-RAY: ICD-10-CM

## 2017-04-28 PROCEDURE — 99499 UNLISTED E&M SERVICE: CPT | Mod: S$GLB,,, | Performed by: INTERNAL MEDICINE

## 2017-04-28 PROCEDURE — 99999 PR PBB SHADOW E&M-EST. PATIENT-LVL III: CPT | Mod: PBBFAC,,, | Performed by: INTERNAL MEDICINE

## 2017-04-28 PROCEDURE — 99215 OFFICE O/P EST HI 40 MIN: CPT | Mod: S$GLB,,, | Performed by: INTERNAL MEDICINE

## 2017-04-28 PROCEDURE — 1160F RVW MEDS BY RX/DR IN RCRD: CPT | Mod: S$GLB,,, | Performed by: INTERNAL MEDICINE

## 2017-04-28 PROCEDURE — 99214 OFFICE O/P EST MOD 30 MIN: CPT | Mod: S$GLB,,, | Performed by: INTERNAL MEDICINE

## 2017-04-28 PROCEDURE — 93000 ELECTROCARDIOGRAM COMPLETE: CPT | Mod: S$GLB,,, | Performed by: INTERNAL MEDICINE

## 2017-04-28 RX ORDER — ECONAZOLE NITRATE 10 MG/G
CREAM TOPICAL 2 TIMES DAILY
Qty: 30 G | Refills: 1 | Status: SHIPPED | OUTPATIENT
Start: 2017-04-28 | End: 2017-07-24 | Stop reason: SDUPTHER

## 2017-04-28 NOTE — PROGRESS NOTES
Subjective:       Patient ID: Sisi Medel is a 56 y.o. female.    Chief Complaint: Follow-up    HPI  55 y/o woman with h/o sarcoidosis, chronic combined CHF (most recent EF 40-45%), PAF, HTN, ESRD on HD MWF, obesity, YOANDY, chronic respiratory failure here for follow-up.   Since last visit 1 month ago, in ER twice.    Primary concern today is her peripheral neuropathy. She was also admitted (obs overnight) for syncope last week, attributed to hypotension with volume depletion after HD.    Neuropathy, worse in R foot - today patient clarified that this started years ago after having shingles on her R foot, then worsened after she started dialysis. She does have some pain/burning in her L foot as well.   Saw Dr Harrell recently; as she felt that gabapentin was no longer helping, this was stopped and she was recommended to use capsaicin cream.  She tried the capsaicin cream but wasn't able to tolerate this due to burning sensation.   Has had EMG/NCS done of left UE and LE in 2014, but I do not see testing on her R LE.      Seizure disorder - taking keppra 500mg twice daily, no seizures recently.    Atrial fibrillation, CHF - afib onset 6/2016, underwent JARAD/DC cardioversion at that time but has been in paroxysmal AF since. Implanted loop recorder placed earlier this year (now removed). Pacemaker placed 1/11. Prescribed ASA, coumadin.  Previously on metoprolol but this has been stopped for hypotension and episodes of syncope.  Following with cardiology, coumadin clinic.   Takes midodrine daily for hypotension (higher doses on HD days).   Episode of palpitations yesterday. Seeing Dr Adorno today - being evaluated for another procedure to address her atrial fibrillation.    ESRD on HD TTS - follows with Dr Aaron Tesfaye for dialysis.  Taking renvela. Elevated PTH.  Vitamin D low on recent checks. She is taking vitamin D supplements    Sarcoidosis, chronic hypoxia - follows with Dr Harvey with Pulmonology. On chronic  steroids. PFTs stable per pulmonology note. Chronically on supplemental O2 as above. Planned for follow-up with Pulm at 4 months. Recommended to begin PPI to treat GERD (for recent cough), feels that this is helping, and that her previous heartburn has improved.     ?YOANDY - noted prior to 2013, reported to be prescribed CPAP at 5cm but patient today says she only used this a few times, didn't tolerate it. She does not use CPAP and says she doesn't believe she has sleep apnea.    Thyroid - h/o subclinical hyperthyroidism noted on some recent labs, but this was rechecked 3/27 and all levels were in normal range. TSH again normal 4/8/17. No longer having hot flashes or heat intolerance.    Gout- she is no longer taking allopurinol; was instructed to stop this related to cardiac issues during one of her hospitalizations.    H/o DM / borderline DM - A1c ranging from 5.5 to 6.7 over past 4 years, most recently 5.7. Not on medications for this.    Review of Systems   Constitutional: Positive for fatigue. Negative for activity change (less active than before going on HD, but better than last visit), appetite change and fever.   HENT: Negative.    Eyes: Negative for visual disturbance.   Respiratory: Positive for shortness of breath (baseline; improved since last visit). Negative for cough.    Cardiovascular: Negative for chest pain, palpitations and leg swelling.   Gastrointestinal: Negative for abdominal pain, constipation, diarrhea and nausea.   Endocrine: Negative.    Genitourinary: Positive for decreased urine volume (little to no urination (on HD)).   Musculoskeletal: Positive for arthralgias and myalgias (muscle aches, foot pain, foot cramps, neck cramps since starting HD; chronic).   Skin: Negative for rash.   Neurological: Positive for weakness (generalized, intermittent; feeling ok now) and numbness (paresthesias / burning, both feet). Negative for dizziness, seizures, speech difficulty and light-headedness.  "  Psychiatric/Behavioral: Positive for dysphoric mood (related to health issues; better than usual today).         Past medical history, surgical history, and family medical history reviewed and updated as appropriate.    Medications and allergies reviewed.     Objective:          Vitals:    04/28/17 0815   BP: (!) 90/58   Pulse: 80   SpO2: 96%   Weight: 121 kg (266 lb 12.1 oz)   Height: 5' 8" (1.727 m)     Body mass index is 40.56 kg/(m^2).  Physical Exam   Constitutional: She is oriented to person, place, and time. She appears well-developed and well-nourished. No distress.   Pleasant obese woman sitting comfortably. Wearing portable O2. Walking with rolling walker.   HENT:   Head: Normocephalic and atraumatic.   Mouth/Throat: Oropharynx is clear and moist.   Eyes: Conjunctivae and EOM are normal. Pupils are equal, round, and reactive to light.   Neck: Normal range of motion. Neck supple. No JVD present.   Cardiovascular: Normal rate, regular rhythm and normal heart sounds.    No murmur heard.  Pulmonary/Chest: Effort normal and breath sounds normal. No respiratory distress.   Abdominal: Soft. Bowel sounds are normal. She exhibits no distension. There is no tenderness.   Musculoskeletal: She exhibits no edema or tenderness.   Lymphadenopathy:     She has no cervical adenopathy.   Neurological: She is alert and oriented to person, place, and time. She has normal strength. No cranial nerve deficit or sensory deficit. Gait normal.   Skin: Skin is warm and dry. She is not diaphoretic.   Psychiatric: She has a normal mood and affect.   Vitals reviewed.      Lab Results   Component Value Date    WBC 9.81 04/23/2017    HGB 14.2 04/23/2017    HCT 44.1 04/23/2017     04/23/2017    CHOL 189 05/17/2016    TRIG 69 05/17/2016    HDL 85 (H) 05/17/2016    ALT 22 04/22/2017    AST 25 04/22/2017     (L) 04/23/2017    K 5.2 (H) 04/23/2017    CL 91 (L) 04/23/2017    CREATININE 6.2 (H) 04/23/2017    BUN 42 (H) 04/23/2017 "    CO2 25 04/23/2017    TSH 1.444 04/08/2017    INR 2.0 04/26/2017    HGBA1C 5.7 05/17/2016       Assessment:       1. ESRD on hemodialysis    2. Persistent atrial fibrillation    3. Chronic combined systolic and diastolic heart failure    4. Pulmonary sarcoidosis    5. PHN (postherpetic neuralgia)    6. Polyneuropathy associated with underlying disease    7. Seizure disorder    8. Osteopenia determined by x-ray        Plan:   Sisi was seen today for follow-up.    Diagnoses and all orders for this visit:    ESRD on hemodialysis - stable currently though with frequent HD-associated hypotension, possible hypovolemia. Continue to follow with nephrologist    Persistent atrial fibrillation - following up with Dr Adorno today - planned for possible procedure/ablation to address this. On coumadin, though having difficulty maintaining goal INR and frustrated with this. Message sent to Dr Adorno.     Chronic combined systolic and diastolic heart failure - currently stable though BP often very low. Continue current medications for now.     Pulmonary sarcoidosis - continue chronic steroids, home O2. Follow up with Pulm as recommended    PHN (postherpetic neuralgia), Polyneuropathy associated with underlying disease - gabapentin not sufficiently effective, didn't tolerate capsaicin. Today, pain in her feet is the most bothersome symptom for her. Message sent to Dr Harrell re: exploring other options for treatment or further evaluation, as these also are limited by her comorbidities    Seizure disorder - no seizures, stable, continue current meds    Osteopenia determined by x-ray - reviewed results with patient. Recommended vitamin D supplement, talk to nephrologist about calcium. At high risk for progression given her ESRD. Repeat DEXA in 2 years.   Comments:  dexa done 4/7/17    Other orders  -     econazole nitrate 1 % cream; Apply topically 2 (two) times daily. As needed for fungal skin infection - use for 2-4 weeks  No clear  fungal skin infection on feet today but she would like to have this at home if it recurs. Knows signs/symptoms to watch for.    Health maintenance reviewed with patient.     Return in about 3 months (around 7/28/2017) for follow up.    Carlos A Caputo MD  Internal Medicine  [facility]  4/28/2017

## 2017-04-28 NOTE — MR AVS SNAPSHOT
[facility] - Internal Medicine  [address]  [facility] [address]  Phone: [phone]  Fax: [phone]                  Sisi Medel   2017 8:00 AM   Office Visit    Description:  Female : 1960   Provider:  Carlos A Caputo MD   Department:  [facility] - Internal Medicine           Reason for Visit     Follow-up           Diagnoses this Visit        Comments    ESRD on hemodialysis    -  Primary     Persistent atrial fibrillation         Chronic combined systolic and diastolic heart failure         Pulmonary sarcoidosis                To Do List           Future Appointments        Provider Department Dept Phone    2017 12:30 PM EKG, APPT [facility] - [phone]    2017 1:00 PM PACEMAKER, ICD [facility]rhythmia [phone]    2017 1:40 PM Bladimir Adorno MD [facility]rhythmia [phone]    2017 7:10 AM LAB, APPOINTMENT [place] [facility] [phone]    2017 8:00 AM Cara Chavarria MD [facility] [phone]      Goals (5 Years of Data)     None      Follow-Up and Disposition     Return in about 3 months (around 2017) for follow up.       These Medications        Disp Refills Start End    econazole nitrate 1 % cream 30 g 1 2017     Apply topically 2 (two) times daily. As needed for fungal skin infection - use for 2-4 weeks - Topical (Top)    Pharmacy: [facility] [address] [place] [address] AT [facility] [address] Ph #: [phone]         [facility] On Call     [facility] -  Assistance  Unless otherwise directed by your provider, please contact [facility], our nurse care line that is available for  assistance.     Registered nurses in the [facility] provide: appointment scheduling, clinical advisement, health education, and other advisory services.  Call: [phone] (toll free)                Medications           Message regarding Medications     Verify the changes and/or additions to your medication regime listed below are the same as discussed with your clinician today.  If any of these changes or additions are incorrect, please notify your healthcare provider.        START taking these NEW medications        Refills    econazole nitrate 1 % cream 1    Sig: Apply topically 2 (two) times daily. As needed for fungal skin infection - use for 2-4 weeks    Class: Normal    Route: Topical (Top)           Verify that the below list of medications is an accurate representation of the medications you are currently taking.  If none reported, the list may be blank. If incorrect, please contact your healthcare provider. Carry this list with you in case of emergency.           Current Medications     ACZONE 5 % topical gel Apply to face every morning    ammonium lactate (LAC-HYDRIN) 12 % lotion Apply topically as needed (to legs).     atorvastatin (LIPITOR) 80 MG tablet TAKE 1 TABLET(80 MG) BY MOUTH EVERY DAY    cholecalciferol, vitamin D3, 1,000 unit capsule Take 1 capsule (1,000 Units total) by mouth once daily.    diclofenac sodium 1 % Gel Apply 2 g topically every 8 (eight) hours as needed.    digoxin (LANOXIN) 125 mcg tablet Take 1 tablet (0.125 mg total) by mouth every other day.    docusate sodium (COLACE) 100 MG capsule Take 100 mg by mouth 2 (two) times daily as needed for Constipation.    econazole nitrate 1 % cream Apply topically 2 (two) times daily. As needed for fungal skin infection - use for 2-4 weeks    fludrocortisone (FLORINEF) 0.1 mg Tab Take 100 mcg by mouth 2 (two) times daily.    gabapentin (NEURONTIN) 300 MG capsule TAKE ONE CAPSULE BY MOUTH THREE TIMES DAILY    inulin-sorbitol 2 gram Chew Take 2 tablets by mouth 2 (two) times daily.    ketorolac 0.5% (ACULAR) 0.5 % Drop instill one drop into both eyes every morning    lactulose (CHRONULAC) 20 gram/30 mL Soln Take 15 mLs (10 g total) by  "mouth 3 (three) times daily as needed (for constipation).    levetiracetam (KEPPRA) 500 MG Tab Take 1 tablet (500 mg total) by mouth 2 (two) times daily.    midodrine (PROAMATINE) 5 MG Tab Take 2 tablets (10 mg total) by mouth every Mon, Wed, Fri, Sun.    midodrine (PROAMATINE) 5 MG Tab Take 3 tablets (15 mg total) by mouth every Tues, Thurs, Sat. Take 2 tablets (10 mg total) by mouth on non dialysis days.    omeprazole (PRILOSEC) 20 MG capsule Take 1 capsule (20 mg total) by mouth once daily.    polyethylene glycol (GLYCOLAX) 17 gram/dose powder Take 17 g by mouth once daily. Dissolve in juice.    prednisoLONE acetate (PRED FORTE) 1 % DrpS INSTILL ONE DROP INTO  LEFT EYE THREE TIMES A DAY    predniSONE (DELTASONE) 10 MG tablet Takes one 10 mg tablet every morning    RENVELA 800 mg Tab TAKE 1 TABLET BY MOUTH THREE TIMES DAILY WITH MEALS    tizanidine 4 mg Cap Take 1 capsule by mouth daily as needed.    warfarin (COUMADIN) 5 MG tablet Take 1 tablet (5 mg total) by mouth Daily. Per Coumadin Clinic           Clinical Reference Information           Your Vitals Were     BP Pulse Height Weight Last Period SpO2    90/58 80 5' 8" (1.727 m) 121 kg (266 lb 12.1 oz) (LMP Unknown) 96%    BMI                40.56 kg/m2          Blood Pressure          Most Recent Value    BP  (!)  90/58      Allergies as of 4/28/2017     Bactrim [Sulfamethoxazole-trimethoprim]    Nsaids (Non-steroidal Anti-inflammatory Drug)      Immunizations Administered on Date of Encounter - 4/28/2017     None      Maintenance Dialysis History     Start End Type Comments Center    2/17/2016  [facility]            Current D[facility] Center Information     [facility] [address] Phone #:  [phone]    Contact:  [place]  [address] Fax #:  [phone]            Language Assistance Services     ATTENTION: Language assistance services are available, free of charge. Please call [phone].      ATENCIÓN: Si aleksandar mathis " a muniz disposición servicios gratuitos de asistencia lingüística. Laurie al [phone].     MALU Ý: N?u b?n nói Ti?ng Vi?t, có các d?ch v? h? tr? ngôn ng? mi?n phí dành cho b?n. G?i s? [phone].         [facility] complies with applicable Federal civil rights laws and does not discriminate on the basis of race, color, national origin, age, disability, or sex.

## 2017-04-28 NOTE — Clinical Note
Dr Adorno - Matt wanted to check in on this patient, as she's talked to me before about her difficulty with being on coumadin and struggling to eat a diet consistent in vitamin K as well as with needing frequent INR checks. Her INR has sometimes been outside the goal range with this, unsurprisingly. I know that anticoagulation for afib in dialysis patients is complicated, and I couldn't recommend switching to one of the newer anticoagulants. Could you touch base with her about this just to let her know what her options are specifically? I suggested she bring this up at her last visit with you but can't tell if it was discussed.  Thanks! Carlos A Caputo MD

## 2017-04-28 NOTE — Clinical Note
Dr Harrell - I'm seeing Ms Medel today for follow up, and her primary concern is her neuropathy in her R foot (?post-herpetic neuralgia), though she does also have some numbness and pain in L foot as well.  It looks like she's had EMG/NCS but this was done on left upper and lower extremities, showed sensory > motor neuropathy. She has tried the capsaicin but wasn't able to tolerate it.  I'm not sure what the next best step is -- should she follow up with you again, or do you have another recommendation?   Thank you! Carlos A Caputo MD

## 2017-04-28 NOTE — PROGRESS NOTES
"Subjective:    Patient ID:  Sisi Medel is a 56 y.o. female who presents for evaluation of Pacemaker Check      Atrial Fibrillation   Symptoms are negative for chest pain, dizziness, palpitations, shortness of breath, syncope and weakness. Past medical history includes atrial fibrillation.     56 y.o. F  ESRD on HD  HTN   pHTN, on O2  sarcoid    Following HD 6/27/16, during which she was hypotensive (as she was frequently during that period), she had vasovagal/orthostatic syncope.  Also was found in new-onset AF at that time. Underwent JARAD/DCCV.  Since, feeling better. HD duration has been prolonged to reduce hypotension. No further syncope, but she does have fluctuating sx of LH at times, as well as varying levels of "energy." Some clinicians' exams c/w AF since DCCV. No ECG.    We placed an ILR earlier this year.  This has shown PAF.  She feels bad as of yesterday, which has coincided with initiation of AF... despite amiodarone. BP lower with AF.    Decision made for CRT and AVN RFA. CRT-P placed 1/11/17. V pacing only 4%.  Continues to have NYHA III-IV type sx.    echo 6/16: 50%-55% LVEF    My interpretation of today's ECG is AF with HR 92 bpm.    Review of Systems   Constitution: Positive for malaise/fatigue. Negative for weakness.   HENT: Negative.  Negative for ear pain and tinnitus.    Eyes: Negative for blurred vision.   Cardiovascular: Positive for dyspnea on exertion. Negative for chest pain, near-syncope, palpitations and syncope.   Respiratory: Negative.  Negative for shortness of breath.    Endocrine: Negative.  Negative for polyuria.   Hematologic/Lymphatic: Does not bruise/bleed easily.   Skin: Negative.  Negative for rash.   Musculoskeletal: Negative.  Negative for joint pain and muscle weakness.   Gastrointestinal: Negative.  Negative for abdominal pain and change in bowel habit.   Genitourinary: Negative for frequency.   Neurological: Positive for light-headedness. Negative for " dizziness.   Psychiatric/Behavioral: Negative.  Negative for depression. The patient is not nervous/anxious.    Allergic/Immunologic: Negative for environmental allergies.        Objective:    Physical Exam   Constitutional: She is oriented to person, place, and time. Vital signs are normal. She appears well-developed and well-nourished. She is active and cooperative.   HENT:   Head: Normocephalic and atraumatic.   Eyes: Conjunctivae and EOM are normal.   Neck: Normal range of motion. Carotid bruit is not present. No tracheal deviation and no edema present. No thyroid mass and no thyromegaly present.   Cardiovascular: Normal rate, normal heart sounds, intact distal pulses and normal pulses.  An irregularly irregular rhythm present.  No extrasystoles are present. PMI is not displaced.  Exam reveals no gallop and no friction rub.    No murmur heard.  Pulmonary/Chest: Effort normal. No respiratory distress. She has decreased breath sounds. She has no wheezes. She has no rales.   Abdominal: Soft. Normal appearance. She exhibits no distension. There is no hepatosplenomegaly.   Musculoskeletal: Normal range of motion.   Neurological: She is alert and oriented to person, place, and time. Coordination normal.   Skin: Skin is warm and dry. No rash noted.   Psychiatric: She has a normal mood and affect. Her speech is normal and behavior is normal. Thought content normal. Cognition and memory are normal.   Nursing note and vitals reviewed.        Assessment:       1. Pulmonary sarcoidosis    2. Persistent atrial fibrillation    3. ESRD on hemodialysis    4. PAF (paroxysmal atrial fibrillation)         Plan:       Add V-trigger to VVI ( -> VVT) to take advantage of some biV pacing.  RFA AVN, which hopefully will have some salutary effect on her symptom burden.    I spent about a half hour discussing the nature of EP study and ablation, including possible transseptal puncture. We discused risks and benefits at length. Our  discussion included, but was not limited to the risk of death, infection, bleeding, stroke, MI, cardiac perforation, embolism, cardiac tamponade, skin burns, and other organic injury including the possibility for need for surgery or pacemaker implantation.  I discussed with patient risks, indications, benefits, and alternatives of the planned procedure. All questions were answered. Patient understands and wishes to proceed.  Pt understands that she'll become PPM-dependent after RFA of AVN.

## 2017-04-28 NOTE — MR AVS SNAPSHOT
[facility]  [address]  [place] [address]  Phone: [phone]  Fax: [phone]                  Sisi Medel   2017 1:40 PM   Office Visit    Description:  Female : 1960   Provider:  Bladimir Adorno MD   Department:  [facility]           Reason for Visit     Pacemaker Check           Diagnoses this Visit        Comments    Pulmonary sarcoidosis    -  Primary     Persistent atrial fibrillation         ESRD on hemodialysis         PAF (paroxysmal atrial fibrillation)                To Do List           Future Appointments        Provider Department Dept Phone    2017 7:10 AM LAB, APPOINTMENT [place] [facility] [phone]    2017 8:00 AM Cara Chavarria MD [facility] [phone]    2017 10:45 AM Chepe Kwok, PharmD [facility] - Coumadin [phone]    2017 12:30 PM VASCULAR, LAB [facility] - Vascular Laboratory [phone]    2017 1:00 PM Torrey Villafana MD [facility] - Vascular Surgery [phone]      Goals (5 Years of Data)     None      [facility]     [facility] -  Assistance  Unless otherwise directed by your provider, please contact [facility], our nurse care line that is available for  assistance.     Registered nurses in the [facility] provide: appointment scheduling, clinical advisement, health education, and other advisory services.  Call: [phone] (toll free)               Medications           Message regarding Medications     Verify the changes and/or additions to your medication regime listed below are the same as discussed with your clinician today.  If any of these changes or additions are incorrect, please notify your healthcare provider.             Verify that the below list of medications is an accurate representation of the medications you are currently taking.  If none reported, the list may be  blank. If incorrect, please contact your healthcare provider. Carry this list with you in case of emergency.           Current Medications     ACZONE 5 % topical gel Apply to face every morning    ammonium lactate (LAC-HYDRIN) 12 % lotion Apply topically as needed (to legs).     atorvastatin (LIPITOR) 80 MG tablet TAKE 1 TABLET(80 MG) BY MOUTH EVERY DAY    cholecalciferol, vitamin D3, 1,000 unit capsule Take 1 capsule (1,000 Units total) by mouth once daily.    diclofenac sodium 1 % Gel Apply 2 g topically every 8 (eight) hours as needed.    digoxin (LANOXIN) 125 mcg tablet Take 1 tablet (0.125 mg total) by mouth every other day.    docusate sodium (COLACE) 100 MG capsule Take 100 mg by mouth 2 (two) times daily as needed for Constipation.    econazole nitrate 1 % cream Apply topically 2 (two) times daily. As needed for fungal skin infection - use for 2-4 weeks    fludrocortisone (FLORINEF) 0.1 mg Tab Take 100 mcg by mouth 2 (two) times daily.    gabapentin (NEURONTIN) 300 MG capsule TAKE ONE CAPSULE BY MOUTH THREE TIMES DAILY    inulin-sorbitol 2 gram Chew Take 2 tablets by mouth 2 (two) times daily.    ketorolac 0.5% (ACULAR) 0.5 % Drop instill one drop into both eyes every morning    lactulose (CHRONULAC) 20 gram/30 mL Soln Take 15 mLs (10 g total) by mouth 3 (three) times daily as needed (for constipation).    levetiracetam (KEPPRA) 500 MG Tab Take 1 tablet (500 mg total) by mouth 2 (two) times daily.    midodrine (PROAMATINE) 5 MG Tab Take 2 tablets (10 mg total) by mouth every Mon, Wed, Fri, Sun.    midodrine (PROAMATINE) 5 MG Tab Take 3 tablets (15 mg total) by mouth every Tues, Thurs, Sat. Take 2 tablets (10 mg total) by mouth on non dialysis days.    omeprazole (PRILOSEC) 20 MG capsule Take 1 capsule (20 mg total) by mouth once daily.    polyethylene glycol (GLYCOLAX) 17 gram/dose powder Take 17 g by mouth once daily. Dissolve in juice.    prednisoLONE acetate (PRED FORTE) 1 % DrpS INSTILL ONE DROP INTO   "LEFT EYE THREE TIMES A DAY    predniSONE (DELTASONE) 10 MG tablet Takes one 10 mg tablet every morning    RENVELA 800 mg Tab TAKE 1 TABLET BY MOUTH THREE TIMES DAILY WITH MEALS    tizanidine 4 mg Cap Take 1 capsule by mouth daily as needed.    warfarin (COUMADIN) 5 MG tablet Take 1 tablet (5 mg total) by mouth Daily. Per [facility]           Clinical Reference Information           Your Vitals Were     BP Pulse Height Weight Last Period BMI    83/48 92 5' 8" (1.727 m) 120.7 kg (266 lb) (LMP Unknown) 40.45 kg/m2      Blood Pressure          Most Recent Value    BP  (!)  83/48      Allergies as of 4/28/2017     Bactrim [Sulfamethoxazole-trimethoprim]    Nsaids (Non-steroidal Anti-inflammatory Drug)      Immunizations Administered on Date of Encounter - 4/28/2017     None      Orders Placed During Today's Visit      Normal Orders This Visit    Rhythm strip       Maintenance Dialysis History     Start End Type Comments Center    [facility]/17/2016  [facility]            Current Dialysis Center Information     [facility] [address] Phone #:  [phone]    Contact:  [place]  [address] Fax #:  [phone]            Language Assistance Services     ATTENTION: Language assistance services are available, free of charge. Please call [phone].      ATENCIÓN: Si nancy denisa, tiene a muniz disposición servicios gratuitos de asistencia lingüística. Llame al [phone].     MALU Ý: N?u b?n nói Ti?ng Vi?t, có các d?ch v? h? tr? ngôn ng? mi?n phí dành cho b?n. G?i s? [phone].         [facility] complies with applicable Federal civil rights laws and does not discriminate on the basis of race, color, national origin, age, disability, or sex.        "

## 2017-05-01 ENCOUNTER — TELEPHONE (OUTPATIENT)
Dept: INTERNAL MEDICINE | Facility: CLINIC | Age: 57
End: 2017-05-01

## 2017-05-01 DIAGNOSIS — G63 POLYNEUROPATHY ASSOCIATED WITH UNDERLYING DISEASE: ICD-10-CM

## 2017-05-01 DIAGNOSIS — B02.29 PHN (POSTHERPETIC NEURALGIA): Primary | ICD-10-CM

## 2017-05-01 RX ORDER — TIZANIDINE HYDROCHLORIDE 4 MG/1
CAPSULE, GELATIN COATED ORAL
Qty: 90 CAPSULE | Refills: 0 | OUTPATIENT
Start: 2017-05-01

## 2017-05-01 RX ORDER — LIDOCAINE 50 MG/G
2 PATCH TOPICAL DAILY
Qty: 90 PATCH | Refills: 2 | Status: SHIPPED | OUTPATIENT
Start: 2017-05-01 | End: 2017-08-04

## 2017-05-01 NOTE — TELEPHONE ENCOUNTER
Please call patient to let her know that Dr Harrell recommended a lidocaine patch for her neuropathic foot pain as the next step in treatment. I have sent in this prescription to her pharmacy; she should let us know if she has trouble getting it. If unable to get patches or if patches are not effective enough, we could potentially try a lidocaine cream, though this tends to not last as long.    Carlos A Caputo MD

## 2017-05-01 NOTE — TELEPHONE ENCOUNTER
----- Message from Silas Harrell III, MD sent at 4/28/2017  4:45 PM CDT -----  Sorry I am getting back to you late.  This is my first day back from [facility] in [place] and am still trying to get caught up.  If unable to tolerate capsacin, would consider a Lidoderm patch next.  Pharmacological options are unfortunately limited by her medical co morbidities.  Hope this helps!  ----- Message -----     From: Carlos A Caputo MD     Sent: 4/28/2017   8:50 AM       To: MD Dr Julio Cesar Self III - I'm seeing Ms Medel today for follow up, and her primary concern is her neuropathy in her R foot (?post-herpetic neuralgia), though she does also have some numbness and pain in L foot as well.   It looks like she's had EMG/NCS but this was done on left upper and lower extremities, showed sensory > motor neuropathy.  She has tried the capsaicin but wasn't able to tolerate it.   I'm not sure what the next best step is -- should she follow up with you again, or do you have another recommendation?     Thank you!  Carlos A Caputo MD

## 2017-05-08 ENCOUNTER — OFFICE VISIT (OUTPATIENT)
Dept: TRANSPLANT | Facility: CLINIC | Age: 57
End: 2017-05-08
Payer: MEDICARE

## 2017-05-08 ENCOUNTER — LAB VISIT (OUTPATIENT)
Dept: LAB | Facility: HOSPITAL | Age: 57
End: 2017-05-08
Attending: INTERNAL MEDICINE
Payer: MEDICARE

## 2017-05-08 ENCOUNTER — ANTI-COAG VISIT (OUTPATIENT)
Dept: CARDIOLOGY | Facility: CLINIC | Age: 57
End: 2017-05-08
Payer: MEDICARE

## 2017-05-08 ENCOUNTER — TELEPHONE (OUTPATIENT)
Dept: TRANSPLANT | Facility: CLINIC | Age: 57
End: 2017-05-08

## 2017-05-08 VITALS
HEART RATE: 68 BPM | HEIGHT: 68 IN | DIASTOLIC BLOOD PRESSURE: 84 MMHG | BODY MASS INDEX: 41.06 KG/M2 | WEIGHT: 270.94 LBS | SYSTOLIC BLOOD PRESSURE: 115 MMHG

## 2017-05-08 DIAGNOSIS — J96.11 CHRONIC RESPIRATORY FAILURE WITH HYPOXIA: Chronic | ICD-10-CM

## 2017-05-08 DIAGNOSIS — Z99.2 ESRD ON HEMODIALYSIS: Chronic | ICD-10-CM

## 2017-05-08 DIAGNOSIS — Z79.01 LONG-TERM (CURRENT) USE OF ANTICOAGULANTS: Primary | ICD-10-CM

## 2017-05-08 DIAGNOSIS — Z79.01 LONG-TERM (CURRENT) USE OF ANTICOAGULANTS: ICD-10-CM

## 2017-05-08 DIAGNOSIS — I95.3 HEMODIALYSIS-ASSOCIATED HYPOTENSION: ICD-10-CM

## 2017-05-08 DIAGNOSIS — E66.01 MORBID OBESITY WITH BMI OF 40.0-44.9, ADULT: Chronic | ICD-10-CM

## 2017-05-08 DIAGNOSIS — D86.0 PULMONARY SARCOIDOSIS: Chronic | ICD-10-CM

## 2017-05-08 DIAGNOSIS — I48.19 PERSISTENT ATRIAL FIBRILLATION: ICD-10-CM

## 2017-05-08 DIAGNOSIS — N18.6 ESRD ON HEMODIALYSIS: Chronic | ICD-10-CM

## 2017-05-08 DIAGNOSIS — I50.42 CHRONIC COMBINED SYSTOLIC AND DIASTOLIC HEART FAILURE: Primary | Chronic | ICD-10-CM

## 2017-05-08 LAB
ALBUMIN SERPL BCP-MCNC: 3.6 G/DL
ALP SERPL-CCNC: 130 U/L
ALT SERPL W/O P-5'-P-CCNC: 18 U/L
ANION GAP SERPL CALC-SCNC: 18 MMOL/L
AST SERPL-CCNC: 22 U/L
BASOPHILS # BLD AUTO: 0.02 K/UL
BASOPHILS NFR BLD: 0.2 %
BILIRUB SERPL-MCNC: 0.6 MG/DL
BNP SERPL-MCNC: 303 PG/ML
BUN SERPL-MCNC: 51 MG/DL
CALCIUM SERPL-MCNC: 10.5 MG/DL
CHLORIDE SERPL-SCNC: 91 MMOL/L
CO2 SERPL-SCNC: 25 MMOL/L
CREAT SERPL-MCNC: 8.7 MG/DL
CTP QC/QA: YES
DIFFERENTIAL METHOD: ABNORMAL
EOSINOPHIL # BLD AUTO: 0.1 K/UL
EOSINOPHIL NFR BLD: 0.7 %
ERYTHROCYTE [DISTWIDTH] IN BLOOD BY AUTOMATED COUNT: 14.3 %
EST. GFR  (AFRICAN AMERICAN): 5.4 ML/MIN/1.73 M^2
EST. GFR  (NON AFRICAN AMERICAN): 4.6 ML/MIN/1.73 M^2
GLUCOSE SERPL-MCNC: 147 MG/DL
HCT VFR BLD AUTO: 44.3 %
HGB BLD-MCNC: 14.1 G/DL
INR PPP: 2.6 (ref 2–3)
LYMPHOCYTES # BLD AUTO: 2.6 K/UL
LYMPHOCYTES NFR BLD: 26.7 %
MCH RBC QN AUTO: 29.8 PG
MCHC RBC AUTO-ENTMCNC: 31.8 %
MCV RBC AUTO: 94 FL
MONOCYTES # BLD AUTO: 1 K/UL
MONOCYTES NFR BLD: 10.2 %
NEUTROPHILS # BLD AUTO: 6 K/UL
NEUTROPHILS NFR BLD: 61.8 %
PLATELET # BLD AUTO: 262 K/UL
PMV BLD AUTO: 10.4 FL
POTASSIUM SERPL-SCNC: 6.6 MMOL/L
PROT SERPL-MCNC: 8 G/DL
PROTHROMBIN TIME, POC: NORMAL (ref 2–3)
RBC # BLD AUTO: 4.73 M/UL
SODIUM SERPL-SCNC: 134 MMOL/L
WBC # BLD AUTO: 9.73 K/UL

## 2017-05-08 PROCEDURE — 99499 UNLISTED E&M SERVICE: CPT | Mod: S$GLB,,, | Performed by: INTERNAL MEDICINE

## 2017-05-08 PROCEDURE — 99999 PR PBB SHADOW E&M-EST. PATIENT-LVL III: CPT | Mod: PBBFAC,,, | Performed by: INTERNAL MEDICINE

## 2017-05-08 PROCEDURE — 1160F RVW MEDS BY RX/DR IN RCRD: CPT | Mod: S$GLB,,, | Performed by: INTERNAL MEDICINE

## 2017-05-08 PROCEDURE — 99211 OFF/OP EST MAY X REQ PHY/QHP: CPT | Mod: 25,S$GLB,, | Performed by: PHARMACIST

## 2017-05-08 PROCEDURE — 99214 OFFICE O/P EST MOD 30 MIN: CPT | Mod: S$GLB,,, | Performed by: INTERNAL MEDICINE

## 2017-05-08 PROCEDURE — 85610 PROTHROMBIN TIME: CPT | Mod: QW,S$GLB,, | Performed by: PHARMACIST

## 2017-05-08 NOTE — PROGRESS NOTES
Subjective:    Patient ID:  Sisi Medel is a 56 y.o. female who presents for follow-up of Congestive Heart Failure (7mo visit)      Congestive Heart Failure   Pertinent negatives include no abdominal pain, change in bowel habit, chest pain, chills, coughing, diaphoresis, fever or weakness.       This is a 57 yo AAF has history sarcoidosis, chronic combined CHF which demonstrated successful remodeling and most recently EF 40-45% range, PAF, ESRD on HD MWF, morbid obesity, YOANDY, HBP, chronic respiratory failure who returns for HF/PH f/u (was on adcirca but stopped due to symptomatic hypotension)    Since last visit, pt says her breathing gets short when her AF is out of control- had a ER visit a little over a week ago when she passed out during HD- on arrival her BP was ok, says since going on midodrine her bp has been more stable.   Reports she does not have much fluid on her, not swelling. Would like to lose wt but says it's hard. Not making urine anymore. Wt 119 after HD this wkend, now she's up to 122kg- trying hard to limit her fluid intake, says she had to celebrate this weekend as her son got engaged (had two margaritas without salt and tolerated it fine)    +numbness/pain in her feet      Review of Systems   Constitution: Negative for chills, decreased appetite, diaphoresis, fever, weakness, malaise/fatigue, night sweats, weight gain and weight loss.   HENT: Negative.    Eyes: Negative.    Cardiovascular: Positive for dyspnea on exertion and leg swelling. Negative for chest pain, cyanosis, irregular heartbeat, near-syncope, orthopnea, palpitations, paroxysmal nocturnal dyspnea and syncope.   Respiratory: Negative for cough, hemoptysis, shortness of breath, sleep disturbances due to breathing, snoring, sputum production and wheezing.    Endocrine: Negative.    Skin: Negative.    Musculoskeletal: Negative.    Gastrointestinal: Negative for bloating, abdominal pain, change in bowel habit and diarrhea.  "  Neurological: Negative for light-headedness and loss of balance.   Psychiatric/Behavioral: Negative for depression.     /84  Pulse 68  Ht 5' 8" (1.727 m)  Wt 122.9 kg (270 lb 15.1 oz)  LMP  (LMP Unknown)  BMI 41.2 kg/m2     Physical Exam   Constitutional: She is oriented to person, place, and time. She appears well-developed and well-nourished.   HENT:   Head: Normocephalic and atraumatic.   Eyes: Conjunctivae and EOM are normal. Pupils are equal, round, and reactive to light.   Neck: Neck supple. No JVD present. No tracheal deviation present. No thyromegaly present.   Cardiovascular: Normal rate, regular rhythm and normal heart sounds.  PMI is displaced.  Exam reveals no gallop and no friction rub.    No murmur heard.  Pulmonary/Chest: Effort normal and breath sounds normal. No respiratory distress. She has no wheezes. She has no rales. She exhibits no tenderness.   Abdominal: Soft. Bowel sounds are normal. She exhibits no distension and no mass. There is no tenderness. There is no rebound and no guarding.   Musculoskeletal: Normal range of motion. She exhibits no edema or tenderness.   Lymphadenopathy:     She has no cervical adenopathy.   Neurological: She is alert and oriented to person, place, and time. She has normal reflexes. No cranial nerve deficit. She exhibits normal muscle tone. Coordination normal.   Skin: Skin is warm and dry.   Psychiatric: She has a normal mood and affect. Her behavior is normal. Thought content normal.   Nursing note and vitals reviewed.          Chemistry        Component Value Date/Time     (L) 05/08/2017 0720    K 6.6 (HH) 05/08/2017 0720    CL 91 (L) 05/08/2017 0720    CO2 25 05/08/2017 0720    BUN 51 (H) 05/08/2017 0720    CREATININE 8.7 (H) 05/08/2017 0720     (H) 05/08/2017 0720        Component Value Date/Time    CALCIUM 10.5 05/08/2017 0720    ALKPHOS 130 05/08/2017 0720    AST 22 05/08/2017 0720    ALT 18 05/08/2017 0720    BILITOT 0.6 05/08/2017 " 0720            Magnesium   Date Value Ref Range Status   04/08/2017 2.1 1.6 - 2.6 mg/dL Final       Lab Results   Component Value Date    WBC 9.73 05/08/2017    HGB 14.1 05/08/2017    HCT 44.3 05/08/2017    MCV 94 05/08/2017     05/08/2017       Lab Results   Component Value Date    INR 2.6 05/08/2017    INR 2.0 04/26/2017    INR 1.7 (H) 04/23/2017       BNP   Date Value Ref Range Status   05/08/2017 303 (H) 0 - 99 pg/mL Final     Comment:     Values of less than 100 pg/ml are consistent with non-CHF populations.   04/22/2017 206 (H) 0 - 99 pg/mL Final     Comment:     Values of less than 100 pg/ml are consistent with non-CHF populations.   04/08/2017 668 (H) 0 - 99 pg/mL Final     Comment:     Values of less than 100 pg/ml are consistent with non-CHF populations.     Assessment:       1. Chronic combined systolic and diastolic CHF now on HD with hypotension limiting ability to pull volume off- doing better on midodrine- minimal volume overload on exam, remains FC III, BNP mildly increased in her usual range   2. Chronic hypoxic resp failure- on home O2, stable   3. Shortness of breath    4. Sarcoidosis- on systemic steroids   5. Pulmonary hypertension-   6. Paroxysmal atrial fibrillation- with plans for ablation    7. Morbid obesity    8. ESRD on HD MWF        Plan:   Asked her to talk to neuro about increasing her neurontin at night    Looks like Dr Adorno is planning AVN ablation which seems like the right thing to do given how much SOB she gets when her HR increases from her AF    Recommend 2 gram sodium restriction and 1500cc fluid restriction.  Encourage physical activity with graded exercise program.  Requested patient to weigh themselves daily, and to notify us if their weight increases by more than 3 lbs in 1 day or 5 lbs in 1 week.    F/u 4 mo with labs

## 2017-05-08 NOTE — PATIENT INSTRUCTIONS
Talk to your neurologist about increasing your neurontin at night    Keep salt intake to under 2000 mg sodium, fluids to under 2 L (64 oz)    Call us if you find yourself getting more short of breath, have more swelling or unexpected weight changes    Talk to Dr Adorno to see if there is an alternative to the coumadin given that you are on dialysis

## 2017-05-08 NOTE — PROGRESS NOTES
Patient was re-educated on the importance of consistency with respect to her diet. I agree with resident's assessment and plan.

## 2017-05-08 NOTE — PROGRESS NOTES
INR is therapeutic today. Patient denies changes to medications or health that would affect INR.  She is inconsistent with her diet, has not had any greens since last visit. She will continue weekly warfarin regimen at this time.  I advised her to contact clinic with any changes, questions, or concerns.

## 2017-05-08 NOTE — LETTER
May 8, 2017        Aaron Ware  [address]  [id number]  [facility] [address]  Phone: [phone]  Fax: [phone]             [facility]  [address]  [facility] [address]  Phone: [phone]   Patient: Sisi Medel   MR Number: [id number]   YOB: 1960   Date of Visit: 5/8/2017       Dear Dr. Aaron Ware    Thank you for referring Sisi Medel to me for evaluation. Attached you will find relevant portions of my assessment and plan of care.    If you have questions, please do not hesitate to call me. I look forward to following Sisi Medel along with you.    Sincerely,    Cara Chavarria MD    Enclosure    If you would like to receive this communication electronically, please contact [email or web address] or [phone] to request [employer] access.    [employer] is a tool which provides read-only access to select patient information with whom you have a relationship. Its easy to use and provides real time access to review your patients record including encounter summaries, notes, results, and demographic information.    If you feel you have received this communication in error or would no longer like to receive these types of communications, please e-mail [email or web address]

## 2017-05-08 NOTE — MR AVS SNAPSHOT
[facility]  [address]  [facility] [address]  Phone: [phone]                  Sisi Medel   2017 8:00 AM   Office Visit    Description:  Female : 1960   Provider:  Cara Chavarria MD   Department:  [facility]           Reason for Visit     Congestive Heart Failure                To Do List           Future Appointments        Provider Department Dept Phone    2017 10:45 AM Chepe Kwok, PharmD [facility] - Coumadin [phone]    2017 12:30 PM VASCULAR, LAB [facility] - Vascular Laboratory [phone]    2017 1:00 PM Torrey Villafana MD [facility] - Vascular Surgery [phone]    2017 8:00 AM HOME MONITOR DEVICE CHECK, [facility] - Arrhythmia [phone]      Goals (5 Years of Data)     None      Follow-Up and Disposition     Return in about 4 months (around 2017).      [facility]     [facility] -  Assistance  Unless otherwise directed by your provider, please contact [facility], our nurse care line that is available for  assistance.     Registered nurses in the [facility] provide: appointment scheduling, clinical advisement, health education, and other advisory services.  Call: [phone] (toll free)               Medications           Message regarding Medications     Verify the changes and/or additions to your medication regime listed below are the same as discussed with your clinician today.  If any of these changes or additions are incorrect, please notify your healthcare provider.             Verify that the below list of medications is an accurate representation of the medications you are currently taking.  If none reported, the list may be blank. If incorrect, please contact your healthcare provider. Carry this list with you in case of emergency.           Current Medications     ACZONE 5 % topical gel Apply to face every morning    ammonium  lactate (LAC-HYDRIN) 12 % lotion Apply topically as needed (to legs).     atorvastatin (LIPITOR) 80 MG tablet TAKE 1 TABLET(80 MG) BY MOUTH EVERY DAY    cholecalciferol, vitamin D3, 1,000 unit capsule Take 1 capsule (1,000 Units total) by mouth once daily.    digoxin (LANOXIN) 125 mcg tablet Take 1 tablet (0.125 mg total) by mouth every other day.    docusate sodium (COLACE) 100 MG capsule Take 100 mg by mouth 2 (two) times daily as needed for Constipation.    econazole nitrate 1 % cream Apply topically 2 (two) times daily. As needed for fungal skin infection - use for 2-4 weeks    fludrocortisone (FLORINEF) 0.1 mg Tab Take 100 mcg by mouth 2 (two) times daily.    gabapentin (NEURONTIN) 300 MG capsule TAKE ONE CAPSULE BY MOUTH THREE TIMES DAILY    inulin-sorbitol 2 gram Chew Take 2 tablets by mouth 2 (two) times daily.    ketorolac 0.5% (ACULAR) 0.5 % Drop instill one drop into both eyes every morning    lactulose (CHRONULAC) 20 gram/30 mL Soln Take 15 mLs (10 g total) by mouth 3 (three) times daily as needed (for constipation).    levetiracetam (KEPPRA) 500 MG Tab Take 1 tablet (500 mg total) by mouth 2 (two) times daily.    lidocaine (LIDODERM) 5 % Place 2 patches onto the skin once daily. One on each foot. Remove & Discard patch within 12 hours or as directed by MD    midodrine (PROAMATINE) 5 MG Tab Take 2 tablets (10 mg total) by mouth every Mon, Wed, Fri, Sun.    midodrine (PROAMATINE) 5 MG Tab Take 3 tablets (15 mg total) by mouth every Tues, Thurs, Sat. Take 2 tablets (10 mg total) by mouth on non dialysis days.    omeprazole (PRILOSEC) 20 MG capsule Take 1 capsule (20 mg total) by mouth once daily.    polyethylene glycol (GLYCOLAX) 17 gram/dose powder Take 17 g by mouth once daily. Dissolve in juice.    prednisoLONE acetate (PRED FORTE) 1 % DrpS INSTILL ONE DROP INTO  LEFT EYE THREE TIMES A DAY    predniSONE (DELTASONE) 10 MG tablet Takes one 10 mg tablet every morning    RENVELA 800 mg Tab TAKE 1 TABLET BY  "MOUTH THREE TIMES DAILY WITH MEALS    tizanidine 4 mg Cap Take 1 capsule by mouth daily as needed.    warfarin (COUMADIN) 5 MG tablet Take 1 tablet (5 mg total) by mouth Daily. Per Coumadin Clinic    diclofenac sodium 1 % Gel Apply 2 g topically every 8 (eight) hours as needed.           Clinical Reference Information           Your Vitals Were     BP Pulse Height Weight Last Period BMI    115/84 68 5' 8" (1.727 m) 122.9 kg (270 lb 15.1 oz) (LMP Unknown) 41.2 kg/m2      Blood Pressure          Most Recent Value    BP  115/84      Allergies as of 5/8/2017     Bactrim [Sulfamethoxazole-trimethoprim]    Nsaids (Non-steroidal Anti-inflammatory Drug)      Immunizations Administered on Date of Encounter - 5/8/2017     None      Maintenance Dialysis History     Start End Type Comments Center    2/17/2016  [facility]            Current Dialysis Center Information     [facility] [address] Phone #:  [phone]    Contact:  [place]  [address] Fax #:  [phone]            Instructions    Talk to your neurologist about increasing your neurontin at night    Keep salt intake to under 2000 mg sodium, fluids to under 2 L (64 oz)    Call us if you find yourself getting more short of breath, have more swelling or unexpected weight changes    Talk to Dr Adorno to see if there is an alternative to the coumadin given that you are on dialysis       Language Assistance Services     ATTENTION: Language assistance services are available, free of charge. Please call [phone].      ATENCIÓN: Si habla español, tiene a muniz disposición servicios gratuitos de asistencia lingüística. Llame al [phone].     CHÚ Ý: N?u b?n nói Ti?ng Vi?t, có các d?ch v? h? tr? ngôn ng? mi?n phí dành cho b?n. G?i s? [phone].         [facility] complies with applicable Federal civil rights laws and does not discriminate on the basis of race, color, national origin, age, disability, or sex.        "

## 2017-05-08 NOTE — TELEPHONE ENCOUNTER
0842 am:  Received call from [facility] of lab reporting a critical lab value. She reported K+ 6.6 and showing no signs of hemolysis. Repeated back as per protocol.   0844 am: To clinic and informed Dr. Chavarria of pts K+ level this am of 6.6 and lab reporting no signs of hemolysis. Pt was still in our clinic area.  Dr. Chavarria spoke with pt about this high Potassium level and possible complications of having a high potassium level. Pt told her she receives dialysis on Saturday/Tuesday and Thursday. Dr. Chavarria wants pt to be dialyzed today.  asked that I call pts dialysis center and ask them to dialyze pt today. Pt provided me with her dialysis center name, phone number, and her Nephrologists name. I have added this to pts snapshot.    0848 am:  I called [facility] ( [phone]), spoke with nurse Carli. She told me she is pts usual nurse, confirmed pt is dialyzed on Sat/Tues/Thursday.  I informed her pt was seen as a visit with her HF Cardiologist, Dr. Cara Chavarria this am.  Her blood work revealed a non hemolyzed potassium level of 6.6 and  would like for her to be dialyzed today. Carli told me the are able to do this today and asked pt to go to [facility] today a noon. I offered to fax today's lab results to [facility] and she accepted this offer.   0905 am:  I called pt and informed her I had spoken with her nurse Carli @ [facility] and Carli said they will be able to dialyze her today at noon and to report there at that time. Pt voiced understanding and agreement to this plan  0908 am:  I then called Carli back and informed her I spoke with pt,  instructed her to go there for 12:00 pm and that I just faxed the lab results from today to her clinic. She has my name and contact phone number, as well as Dr. Chavarria's name, if anything else is needed.

## 2017-05-11 DIAGNOSIS — D86.0 SARCOIDOSIS OF LUNG: ICD-10-CM

## 2017-05-12 ENCOUNTER — OFFICE VISIT (OUTPATIENT)
Dept: VASCULAR SURGERY | Facility: CLINIC | Age: 57
End: 2017-05-12
Attending: NURSE PRACTITIONER
Payer: MEDICARE

## 2017-05-12 ENCOUNTER — HOSPITAL ENCOUNTER (OUTPATIENT)
Dept: VASCULAR SURGERY | Facility: CLINIC | Age: 57
Discharge: HOME | End: 2017-05-12
Attending: NURSE PRACTITIONER
Payer: MEDICARE

## 2017-05-12 VITALS
BODY MASS INDEX: 40.77 KG/M2 | SYSTOLIC BLOOD PRESSURE: 130 MMHG | WEIGHT: 269 LBS | HEART RATE: 89 BPM | TEMPERATURE: 97 F | DIASTOLIC BLOOD PRESSURE: 79 MMHG | HEIGHT: 68 IN

## 2017-05-12 DIAGNOSIS — T82.9XXD COMPLICATION OF AV DIALYSIS FISTULA, SUBSEQUENT ENCOUNTER: Primary | ICD-10-CM

## 2017-05-12 DIAGNOSIS — N18.6 ESRD ON HEMODIALYSIS: Chronic | ICD-10-CM

## 2017-05-12 DIAGNOSIS — T82.858S AV GRAFT STENOSIS, SEQUELA: Primary | ICD-10-CM

## 2017-05-12 DIAGNOSIS — Z99.2 ESRD ON HEMODIALYSIS: Chronic | ICD-10-CM

## 2017-05-12 PROCEDURE — 1160F RVW MEDS BY RX/DR IN RCRD: CPT | Mod: S$GLB,,, | Performed by: SURGERY

## 2017-05-12 PROCEDURE — 99499 UNLISTED E&M SERVICE: CPT | Mod: S$GLB,,, | Performed by: SURGERY

## 2017-05-12 PROCEDURE — 99214 OFFICE O/P EST MOD 30 MIN: CPT | Mod: S$GLB,,, | Performed by: SURGERY

## 2017-05-12 PROCEDURE — 99999 PR PBB SHADOW E&M-EST. PATIENT-LVL III: CPT | Mod: PBBFAC,,, | Performed by: SURGERY

## 2017-05-12 PROCEDURE — 93990 DOPPLER FLOW TESTING: CPT | Mod: S$GLB,,, | Performed by: SURGERY

## 2017-05-12 NOTE — PROGRESS NOTES
Sisi Medel  10/07/2016    HPI:  Patient is a 55 y.o.  female with a h/o HTN, HLD, CHF (EF 20%), COPD on Home O2, Paroxysmal Afib (On Coumadin anticoagulation), Seizure disorder, ESRD on HD via LUE AVG who is here for evaluation of her LUE upper arm AV graft (placed by Dr. Villafana 2/3/16) following declot of graft on 2/7/17. She was recentltly admitted for symptomatic hypotensio discharged from the hospital on 2/19/17, n.  She has been doing well since, using her LUE AVG without issue. No problems with accessing, with flow or with deaccessing.     S/p   2/3/16: LUE AVG creation (Dr Villafana)  6/21/16: Percutaneous mechanical thrombectomy w [place]is Angiojet AVX; 4fr OTW embolectomy of arterial inflow   PTA outflow stenosis with a 7x40 mm balloon  10/12/16: fistulogram (75% stenosis noted), left upper extremity AV graft PTA venous outflow with resolution of stenosis noted  2/2017 Declot and venous outflow PTA and stent with 8 x 50 VIABAHN    Findings/Key Components:   Successful treatment venous outflow stenosis  Good palpable thrill     No  Prior  MI/stroke  Tobacco use: Former smoker     Past Medical History   Diagnosis Date    Anemia in ESRD (end-stage renal disease) 3/7/2016    Cervical radiculopathy 7/20/2015    Chronic combined systolic and diastolic heart failure 6/14/2013     EF 20% 2013, improved with Medical therapy, negative PET 2013    Chronic respiratory failure with hypoxia 04/22/2013     On home oxygen at 2-5 liters    Chronic rhinitis 10/2/2013    DDD (degenerative disc disease), lumbar 6/17/2015    ESRD on hemodialysis 2/23/2016    Essential hypertension     Gout     Hyperlipidemia 3/7/2016    Lateral meniscal tear 5/31/2016    Lumbar stenosis 6/17/2015    Morbid obesity 8/15/2013    Paroxysmal atrial fibrillation 5/16/2014     Not on anticoagulation    Peripheral neuropathy 11/13/2013    Personal history of fall 7/14/2014    Personal history of gastric  ulcer 3/19/2013    Sarcoidosis, lung 2009     Diagnosed in  with ocular manifestation, on 4L home O2 and PO steroids    Secondary pulmonary hypertension 2015    Seizure disorder 3/19/2013    Shingles 2013    Spondylarthrosis 2015     Past Surgical History   Procedure Laterality Date     section, classic      Abdominal surgery      Vascular surgery       Family History   Problem Relation Age of Onset    Kidney failure Mother     Hypertension Mother     Diabetes Mother     Coronary artery disease Father     Hypertension Father     Diabetes Father     Lupus Sister     Diabetes Maternal Grandmother     Asthma Neg Hx     Emphysema Neg Hx     Melanoma Neg Hx     Psoriasis Neg Hx      Social History     Social History    Marital status: Single     Spouse name: N/A    Number of children: N/A    Years of education: N/A     Occupational History    Not on file.     Social History Main Topics    Smoking status: Former Smoker     Packs/day: 0.50     Years: 10.00     Types: Cigarettes     Quit date: 1994    Smokeless tobacco: Never Used    Alcohol use No    Drug use: No    Sexual activity: No     Other Topics Concern    Not on file     Social History Narrative    Lives with boyfriend/partner who helps with her care.     Plans to travel to [place] for 2 weeks in 2016     Current Outpatient Prescriptions on File Prior to Visit   Medication Sig    ACZONE 5 % topical gel Apply topically once daily.     allopurinol (ZYLOPRIM) 100 MG tablet Take 1 tablet (100 mg total) by mouth once daily.    amiodarone (PACERONE) 200 MG Tab Take 1 tablet (200 mg total) by mouth every morning.    ammonium lactate (LAC-HYDRIN) 12 % lotion Apply topically as needed (for scars on arms).     aspirin 81 MG Chew Take 81 mg by mouth every morning.    atorvastatin (LIPITOR) 80 MG tablet Take 80 mg by mouth once daily.     capsicum 0.075% (ZOSTRIX) 0.075 % topical cream Apply topically 3  (three) times daily. For neuropathic pain of feet    gabapentin (NEURONTIN) 100 MG capsule TAKE ONE CAPSULE BY MOUTH THREE TIMES DAILY (Patient taking differently: TAKE ONE CAPSULE BY MOUTH THREE TIMES DAILY-noon, evening, bedtime)    levetiracetam (KEPPRA) 500 MG Tab TAKE 1 TABLET BY MOUTH TWICE DAILY.    melatonin 5 mg Tab Take 1 tablet by mouth nightly.    midodrine (PROAMATINE) 10 MG tablet     midodrine (PROAMATINE) 5 MG Tab     NEPHRO-LINDA 0.8 mg Tab TK 1 T PO  QD    ondansetron (ZOFRAN-ODT) 8 MG TbDL Take 1 tablet (8 mg total) by mouth every 8 (eight) hours as needed.    oxycodone-acetaminophen (PERCOCET) 7.5-325 mg per tablet Take 1 tablet by mouth every 4 (four) hours as needed.    predniSONE (DELTASONE) 10 MG tablet TAKE 3 TABLETS BY MOUTH FOR 7 DAYS, THEN 2 TABLETS FOR 7 DAYS, THEN 1 TABLET EVERY DAY AFTER.    RENVELA 800 mg Tab TAKE 1 TABLET BY MOUTH THREE TIMES DAILY WITH MEALS    tizanidine 4 mg Cap Take 4 mg by mouth 2 (two) times daily as needed. (Patient taking differently: Take 4 mg by mouth daily as needed (for muscle spasms.). )    warfarin (COUMADIN) 5 MG tablet TAKE 1 TABLET(5 MG) BY MOUTH DAILY     No current facility-administered medications on file prior to visit.        REVIEW OF SYSTEMS:  General: negative; ENT: negative; Allergy and Immunology: negative; Hematological and Lymphatic: Negative; Endocrine: negative; Respiratory: no cough, shortness of breath, or wheezing; Cardiovascular: no chest pain or dyspnea on exertion; Gastrointestinal: no abdominal pain/back, change in bowel habits, or bloody stools; Genito-Urinary: no dysuria, trouble voiding, or hematuria; Musculoskeletal: no pain, numbness, to LUE  Neurological: no TIA or stroke symptoms; Psychiatric: no nervousness, anxiety or depression.    PHYSICAL EXAM:   Vitals:    07/14/16 1532   BP: (!) 81/57   Pulse: (!) 52   Temp: 98.9 °F (37.2 °C)       General appearance:  Alert, well-appearing, and in no distress.  Oriented  "to person, place, and time   Neurological: Normal speech, no focal findings noted; CN II - XII grossly intact           Musculoskeletal: Digits/nail without cyanosis/clubbing.  Normal muscle strength/tone.                 Neck: Supple, no significant adenopathy; thyroid is not enlarged                Chest:  Clear to auscultation, symmetric air entry     No use of accessory muscles             Cardiac: Normal rate and regular rhythm, S1 and S2 normal; PMI non-displaced          Abdomen: Soft, nontender, nondistended, no masses or organomegaly     no rebound tenderness noted      Extremities:   LUE AVG with palpable thrill, no increased pulsatility, 2+ distal radial pulse; LUE slightly swollen compared to right which patient reports is "always like that".       LAB RESULTS:  Lab Results   Component Value Date    K 4.5 07/12/2016    K 4.6 07/04/2016    K 5.1 07/03/2016    CREATININE 6.5 (H) 07/12/2016    CREATININE 10.2 (H) 07/04/2016    CREATININE 7.6 (H) 07/03/2016     Lab Results   Component Value Date    WBC 11.09 07/12/2016    WBC 10.38 07/04/2016    WBC 11.01 07/03/2016    HCT 37.5 07/12/2016    HCT 31.4 (L) 07/04/2016    HCT 30.1 (L) 07/03/2016     07/12/2016     07/04/2016     07/03/2016     Lab Results   Component Value Date    HGBA1C 5.7 05/17/2016    HGBA1C 6.7 (H) 10/15/2015    HGBA1C 5.5 06/15/2015     IMAGING:    US Hemodialysis: 5/12/17     Inflow- 127  arterial anastomosis- 297  proximal graft- 86  mid graft- 86   distal graft- 69  venous anastomosis- 608  venous outflow- 293                   volume flow: in ml.min.  585.     Overall Impression:  Color flow duplex exam reveals a patent left upper extremity AV graft. There is noted a visual narrowing and elevated PSVs  of 608 cm./sec at the level fo the venous anastomosis and extending into the venous outflow. Th is finding is consistent with a   focal hemodynamically significant stenosis > 75%. Volume flow at mid bicep level " measures 585 ml./min.    Overall Impression:  No evidence of focal hemodynamically significant stenosis      IMP/PLAN:  55 y.o. female with HTN, HLD, CHF (EF 20%), COPD on Home O2, Paroxysmal Afib (On Coumadin anticoagulation), Seizure disorder, ESRD on HD via LUE AVG who is here today for evaluation of her LUE upper arm AV graft (placed by Dr. Villafana 2/3/16) doing well with no complaints.   She is dialyzing well, however, her duplex today shoes a recurrent outflow stenosis and decreased flow volume from 1600 to about 600.  She will benefit from a fistulogram.      1) LUE AVG fistulogram 5/15/2017  2) proceed normally with HD tomorrow.     Torrey Villafana MD  Vascular & Endovascular Surgery

## 2017-05-14 RX ORDER — PREDNISONE 10 MG/1
TABLET ORAL
Qty: 90 TABLET | Refills: 0 | Status: SHIPPED | OUTPATIENT
Start: 2017-05-14 | End: 2017-08-22 | Stop reason: SDUPTHER

## 2017-05-15 ENCOUNTER — HOSPITAL ENCOUNTER (OUTPATIENT)
Facility: HOSPITAL | Age: 57
Discharge: HOME | End: 2017-05-15
Attending: SURGERY | Admitting: SURGERY
Payer: MEDICARE

## 2017-05-15 VITALS
RESPIRATION RATE: 18 BRPM | HEIGHT: 68 IN | WEIGHT: 266 LBS | DIASTOLIC BLOOD PRESSURE: 78 MMHG | BODY MASS INDEX: 40.32 KG/M2 | SYSTOLIC BLOOD PRESSURE: 112 MMHG | OXYGEN SATURATION: 100 % | HEART RATE: 72 BPM | TEMPERATURE: 98 F

## 2017-05-15 DIAGNOSIS — Z99.2 ESRD ON HEMODIALYSIS: Primary | Chronic | ICD-10-CM

## 2017-05-15 DIAGNOSIS — N18.6 ESRD ON DIALYSIS: ICD-10-CM

## 2017-05-15 DIAGNOSIS — T82.9XXD UNSPECIFIED COMPLICATION OF CARDIAC AND VASCULAR PROSTHETIC DEVICE, IMPLANT AND GRAFT, SUBSEQUENT ENCOUNTER: ICD-10-CM

## 2017-05-15 DIAGNOSIS — Z99.2 ESRD ON DIALYSIS: ICD-10-CM

## 2017-05-15 DIAGNOSIS — N18.6 ESRD ON HEMODIALYSIS: Primary | Chronic | ICD-10-CM

## 2017-05-15 LAB — PERIPHERAL STENT: YES

## 2017-05-15 PROCEDURE — 36903 INTRO CATH DIALYSIS CIRCUIT: CPT | Mod: ,,, | Performed by: SURGERY

## 2017-05-15 PROCEDURE — 99152 MOD SED SAME PHYS/QHP 5/>YRS: CPT | Mod: ,,, | Performed by: SURGERY

## 2017-05-15 PROCEDURE — C1894 INTRO/SHEATH, NON-LASER: HCPCS

## 2017-05-15 PROCEDURE — 25000003 PHARM REV CODE 250

## 2017-05-15 PROCEDURE — C1725 CATH, TRANSLUMIN NON-LASER: HCPCS

## 2017-05-15 PROCEDURE — 63600175 PHARM REV CODE 636 W HCPCS

## 2017-05-15 RX ORDER — CEFAZOLIN SODIUM 2 G/50ML
2 SOLUTION INTRAVENOUS
Status: DISCONTINUED | OUTPATIENT
Start: 2017-05-15 | End: 2017-05-15 | Stop reason: HOSPADM

## 2017-05-15 RX ORDER — HYDROCODONE BITARTRATE AND ACETAMINOPHEN 5; 325 MG/1; MG/1
1 TABLET ORAL EVERY 4 HOURS PRN
Status: DISCONTINUED | OUTPATIENT
Start: 2017-05-15 | End: 2017-05-15 | Stop reason: HOSPADM

## 2017-05-15 RX ORDER — LIDOCAINE HYDROCHLORIDE 10 MG/ML
1 INJECTION, SOLUTION EPIDURAL; INFILTRATION; INTRACAUDAL; PERINEURAL ONCE
Status: DISCONTINUED | OUTPATIENT
Start: 2017-05-15 | End: 2017-05-15 | Stop reason: HOSPADM

## 2017-05-15 NOTE — BRIEF OP NOTE
Brief Operative Note  Date: 05/15/2017    Surgeon(s) and Role:     * Torrey Villafana MD - Primary    Pre-op Diagnosis:  Complication of AV dialysis fistula, subsequent encounter [T82.9XXD]; outflow stenosis    Post-op Diagnosis:  Same    Procedure(s):  1) PTA (7 x 60 [place]) venous outflow LUE AVG (brachial vein)  2) Viabahn (8 x 50) stent graft venous outflow LUE AVG  3) fistulagram    Surgeon: Torrey Villafana MD  Vascular & Endovascular Surgery     Assistant: JOEL Levy DO    Anesthesia: RN IV Sedation    Findings/Key Components:  Successful treatment of venous outflow stenosis, LUE AVG    EBL: Minimal         Specimens     None          I attest to being present for the procedure and performing the case.  Torrey Villafana MD  Vascular & Endovascular Surgery     Discharge Note  SUMMARY    Admit Date: 5/15/2017    Attending Physician: Harjit Starr MD [facility]    Discharge Physician: Harjit Starr MD FACS    Discharge Date: 05/15/2017    Final Diagnosis: Complication of AV dialysis fistula, subsequent encounter [T82.9XXD]    Disposition: Home or self-care    Patient Instructions:

## 2017-05-15 NOTE — INTERVAL H&P NOTE
The patient has been examined and the H&P has been reviewed:    I concur with the findings and no changes have occurred since H&P was written.    Anesthesia/Surgery risks, benefits and alternative options discussed and understood by patient/family.      Review of patient's allergies indicates:   Allergen Reactions    Bactrim [sulfamethoxazole-trimethoprim] Other (See Comments)     Causes renal failure    Nsaids (non-steroidal anti-inflammatory drug) Other (See Comments)     Renal failure           Active Hospital Problems    Diagnosis  POA    ESRD on dialysis [N18.6, Z99.2]  Not Applicable      Resolved Hospital Problems    Diagnosis Date Resolved POA   No resolved problems to display.

## 2017-05-15 NOTE — PLAN OF CARE
Problem: Patient Care Overview  Goal: Plan of Care Review  Outcome: Ongoing (interventions implemented as appropriate)  Received report from Yeimi. Patient s/p fistulogram, AAOx3. VSS, no c/o pain or discomfort at this time, resp even and unlabored. Gauze/tegaderm dressing to left upper arm is CDI. No active bleeding. No hematoma noted. Post procedure protocol reviewed with patient and patient's family. Understanding verbalized. Family members at bedside. Nurse call bell within reach. Will continue to monitor per post procedure protocol.

## 2017-05-15 NOTE — IP AVS SNAPSHOT
[facility]  [address]  [facility] [address]  Phone: [phone]           Patient Discharge Instructions   Our goal is to set you up for success. This packet includes information on your condition, medications, and your home care.  It will help you care for yourself to prevent having to return to the hospital.     Please ask your nurse if you have any questions.      There are many details to remember when preparing to leave the hospital. Here is what you will need to do:    1. Take your medicine. If you are prescribed medications, review your Medication List on the following pages. You may have new medications to  at the pharmacy and others that you'll need to stop taking. Review the instructions for how and when to take your medications. Talk with your doctor or nurses if you are unsure of what to do.     2. Go to your follow-up appointments. Specific follow-up information is listed in the following pages. Your may be contacted by a nurse or clinical provider about future appointments. Be sure we have all of the phone numbers to reach you. Please contact your provider's office if you are unable to make an appointment.     3. Watch for warning signs. Your doctor or nurse will give you detailed warning signs to watch for and when to call for assistance. These instructions may also include educational information about your condition. If you experience any of warning signs to your health, call your doctor.           [facility]  Unless otherwise directed by your provider, please   contact [facility], our nurse care line   that is available for 24/7 assistance.     [phone] (toll-free)     Registered nurses in the [facility]   provide: appointment scheduling, clinical advisement, health education, and other advisory services.                  ** Verify the list of medication(s) below is accurate and up to date. Carry this with you in case of  emergency. If your medications have changed, please notify your healthcare provider.             Medication List      CONTINUE taking these medications        Additional Info                      ACZONE 5 % topical gel   Refills:  2   Generic drug:  dapsone    Instructions:  Apply to face every morning     Begin Date    AM    Noon    PM    Bedtime       ammonium lactate 12 % lotion   Commonly known as:  LAC-HYDRIN   Refills:  2    Instructions:  Apply topically as needed (to legs).     Begin Date    AM    Noon    PM    Bedtime       atorvastatin 80 MG tablet   Commonly known as:  LIPITOR   Quantity:  90 tablet   Refills:  3    Instructions:  TAKE 1 TABLET(80 MG) BY MOUTH EVERY DAY     Begin Date    AM    Noon    PM    Bedtime       cholecalciferol (vitamin D3) 1,000 unit capsule   Refills:  0   Dose:  1000 Units    Instructions:  Take 1 capsule (1,000 Units total) by mouth once daily.     Begin Date    AM    Noon    PM    Bedtime       diclofenac sodium 1 % Gel   Quantity:  100 g   Refills:  0   Dose:  2 g    Instructions:  Apply 2 g topically every 8 (eight) hours as needed.     Begin Date    AM    Noon    PM    Bedtime       digoxin 125 mcg tablet   Commonly known as:  LANOXIN   Quantity:  15 tablet   Refills:  1   Dose:  0.125 mg    Instructions:  Take 1 tablet (0.125 mg total) by mouth every other day.     Begin Date    AM    Noon    PM    Bedtime       econazole nitrate 1 % cream   Quantity:  30 g   Refills:  1    Instructions:  Apply topically 2 (two) times daily. As needed for fungal skin infection - use for 2-4 weeks     Begin Date    AM    Noon    PM    Bedtime       fludrocortisone 0.1 mg Tab   Commonly known as:  FLORINEF   Refills:  0   Dose:  100 mcg    Instructions:  Take 100 mcg by mouth 2 (two) times daily.     Begin Date    AM    Noon    PM    Bedtime       gabapentin 300 MG capsule   Commonly known as:  NEURONTIN   Quantity:  270 capsule   Refills:  3    Instructions:  TAKE ONE CAPSULE BY MOUTH  THREE TIMES DAILY     Begin Date    AM    Noon    PM    Bedtime       inulin-sorbitol 2 gram Chew   Refills:  0   Dose:  2 tablet    Instructions:  Take 2 tablets by mouth 2 (two) times daily.     Begin Date    AM    Noon    PM    Bedtime       ketorolac 0.5% 0.5 % Drop   Commonly known as:  ACULAR   Refills:  3    Instructions:  instill one drop into both eyes every morning     Begin Date    AM    Noon    PM    Bedtime       levetiracetam 500 MG Tab   Commonly known as:  KEPPRA   Quantity:  60 tablet   Refills:  5   Dose:  500 mg    Instructions:  Take 1 tablet (500 mg total) by mouth 2 (two) times daily.     Begin Date    AM    Noon    PM    Bedtime       lidocaine 5 %   Commonly known as:  LIDODERM   Quantity:  90 patch   Refills:  2   Dose:  2 patch    Instructions:  Place 2 patches onto the skin once daily. One on each foot. Remove & Discard patch within 12 hours or as directed by MD     Begin Date    AM    Noon    PM    Bedtime       * midodrine 5 MG Tab   Commonly known as:  PROAMATINE   Quantity:  32 tablet   Refills:  11   Dose:  10 mg    Instructions:  Take 2 tablets (10 mg total) by mouth every Mon, Wed, Fri, Sun.     Begin Date    AM    Noon    PM    Bedtime       * midodrine 5 MG Tab   Commonly known as:  PROAMATINE   Refills:  0   Dose:  15 mg    Instructions:  Take 3 tablets (15 mg total) by mouth every Tues, Thurs, Sat. Take 2 tablets (10 mg total) by mouth on non dialysis days.     Begin Date    AM    Noon    PM    Bedtime       omeprazole 20 MG capsule   Commonly known as:  PRILOSEC   Quantity:  30 capsule   Refills:  5   Dose:  20 mg    Instructions:  Take 1 capsule (20 mg total) by mouth once daily.     Begin Date    AM    Noon    PM    Bedtime       polyethylene glycol 17 gram/dose powder   Commonly known as:  GLYCOLAX   Quantity:  510 g   Refills:  0   Dose:  17 g    Instructions:  Take 17 g by mouth once daily. Dissolve in juice.     Begin Date    AM    Noon    PM    Bedtime       prednisoLONE  acetate 1 % Drps   Commonly known as:  PRED FORTE   Refills:  3    Instructions:  INSTILL ONE DROP INTO  LEFT EYE THREE TIMES A DAY     Begin Date    AM    Noon    PM    Bedtime       predniSONE 10 MG tablet   Commonly known as:  DELTASONE   Quantity:  90 tablet   Refills:  0    Instructions:  TAKE 1 TABLET BY MOUTH EVERY DAY WITH BREAKFAST     Begin Date    AM    Noon    PM    Bedtime       RENVELA 800 mg Tab   Quantity:  90 tablet   Refills:  0   Generic drug:  sevelamer carbonate    Instructions:  TAKE 1 TABLET BY MOUTH THREE TIMES DAILY WITH MEALS     Begin Date    AM    Noon    PM    Bedtime       tizanidine 4 mg Cap   Quantity:  60 capsule   Refills:  0   Dose:  1 capsule    Instructions:  Take 1 capsule by mouth daily as needed.     Begin Date    AM    Noon    PM    Bedtime       warfarin 5 MG tablet   Commonly known as:  COUMADIN   Quantity:  90 tablet   Refills:  3   Dose:  5 mg   Comments:  **Patient requests 90 days supply**    Instructions:  Take 1 tablet (5 mg total) by mouth Daily. Per [facility]     Begin Date    AM    Noon    PM    Bedtime       * Notice:  This list has 2 medication(s) that are the same as other medications prescribed for you. Read the directions carefully, and ask your doctor or other care provider to review them with you.      STOP taking these medications     docusate sodium 100 MG capsule   Commonly known as:  COLACE       lactulose 20 gram/30 mL Soln   Commonly known as:  CHRONULAC                  Please bring to all follow up appointments:    1. A copy of your discharge instructions.  2. All medicines you are currently taking in their original bottles.  3. Identification and insurance card.    Please arrive 15 minutes ahead of scheduled appointment time.    Please call 24 hours in advance if you must reschedule your appointment and/or time.        Your Scheduled Appointments     May 22, 2017  8:15 AM CDT   Anticoagulation with Lesvia Brand, PharmD   [facility] Cou[facility]in  "([facility] )    [address]  [facility] [address]   [phone]            Aug 01, 2017  8:00 AM CDT   Remote Interrogation with HOME MONITOR DEVICE CHECK, [facility]   [facility] Arrhythmia ([facility] )    [address]  [facility] [address]   [phone]              Follow-up Information     Follow up with Torrey Villafana MD.    Specialty:  Vascular Surgery    Why:  Friday for suture removal; us hemodialysis    Contact information:    [address]  [facility] [address]  [phone]          Discharge Instructions     Future Orders    Activity as tolerated     Call MD for:  redness, tenderness, or signs of infection (pain, swelling, redness, odor or green/yellow discharge around incision site)     Call MD for:  severe uncontrolled pain     Call MD for:  temperature >100.4     Diet general     Questions:    Total calories:      Fat restriction, if any:      Protein restriction, if any:      Na restriction, if any:      Fluid restriction:      Additional restrictions:      Remove dressing in 24 hours     VAS US Hemodialysis Access         Admission Information     Date & Time Provider Department CSN    5/15/2017  6:17 AM Torrey Villafana MD [facility] [id number]      Care Providers     Provider Role Specialty Primary office phone    Torrey Villafana MD Attending Provider Vascular Surgery [phone]      Your Vitals Were     BP Pulse Temp Resp Height Weight    112/78 (BP Location: Right arm, Patient Position: Sitting, BP Method: Automatic) 72 97.5 °F (36.4 °C) (Oral) 18 5' 8" (1.727 m) 120.7 kg (266 lb)    Last Period SpO2 BMI          (LMP Unknown) 100% 40.45 kg/m2        Recent Lab Values        4/10/2012 8/22/2013 6/15/2015 10/15/2015 5/17/2016               3:00 PM 10:56 AM 11:06 AM  9:41 AM  9:46 AM       A1C 6.5 (H) 6.0 5.5 6.7 (H) 5.7               Allergies as of 5/15/2017        Reactions    Bactrim " [Sulfamethoxazole-trimethoprim] Other (See Comments)    Causes renal failure    Nsaids (Non-steroidal Anti-inflammatory Drug) Other (See Comments)    Renal failure      Advance Directives     An advance directive is a document which, in the event you are no longer able to make decisions for yourself, tells your healthcare team what kind of treatment you do or do not want to receive, or who you would like to make those decisions for you.  If you do not currently have an advance directive, [facility] encourages you to create one.  For more information call:  [phone]), [phone] ([phone]),  or log on to [email or web address].        Smoking Cessation     If you would like to quit smoking:   You may be eligible for free services if you are a [place] resident and started smoking cigarettes before September 1, 1988.  Call the Smoking Cessation Trust ([facility]) toll free at (433) [phone] or (103) 634-7001.   Call 5-416-QUIT-NOW if you do not meet the above criteria.   Contact us via email: [email or web address]   View our website for more information: [email or web address]        Language Assistance Services     ATTENTION: Language assistance services are available, free of charge. Please call [phone].      ATENCIÓN: Si habla español, tiene a muniz disposición servicios gratuitos de asistencia lingüística. Llame al [phone].     CHÚ Ý: N?u b?n nói Ti?ng Vi?t, có các d?ch v? h? tr? ngôn ng? mi?n phí dành cho b?n. G?i s? [phone].        Heart Failure Education       Heart Failure: Being Active  You have a condition called heart failure. Being active doesnt mean that you have to wear yourself out. Even a little movement each day helps to strengthen your heart. If you cant get out to exercise, you can do simple stretching and strengthening exercises at home. These are good ways to keep you well-conditioned and prevent you and your heart from becoming excessively  weak.    Ideas to get you started  · Add a little movement to things you do now. Walk to mail letters. Park your car at the far end of the parking lot and walk to the store. Walk up a flight of stairs instead of taking the elevator.  · Choose activities you enjoy. You might walk, swim, or ride an exercise bike. Things like gardening and washing the car count, too. Other possibilities include: washing dishes, walking the dog, walking around the mall, and doing aerobic activities with friends.  · Join a group exercise program at a [facility] or [facility], a senior center, or a community center. Or look into a hospital cardiac rehabilitation program. Ask your doctor if you qualify.  Tips to keep you going  · Get up and get dressed each day. Go to a coffee shop and read a newspaper or go somewhere that you'll be in the presence of other active people. Youll feel more like being active.  · Make a plan. Choose one or more activities that you enjoy and that you can easily do. Then plan to do at least one each day. You might write your plan on a calendar.  · Go with a friend or a group if you like company. This can help you feel supported and stay motivated, too.  · Plan social events that you enjoy. This will keep you mentally engaged as well as physically motivated to do things you find pleasure in.  For your safety  · Talk with your healthcare provider before starting an exercise program.  · Exercise indoors when its too hot or too cold outside, or when the air quality is poor. Try walking at a shopping mall.  · Wear socks and sturdy shoes to maintain your balance and prevent falls.  · Start slowly. Do a few minutes several times a day at first. Increase your time and speed little by little.  · Stop and rest whenever you feel tired or get short of breath.  · Dont push yourself on days when you dont feel well.  Date Last Reviewed: 3/20/2016  © 8304-4498 [employer]. [address] [place] [address], [place] [address]. All  rights reserved. This information is not intended as a substitute for professional medical care. Always follow your healthcare professional's instructions.              Heart Failure: Evaluating Your Heart  You have a condition called heart failure. To evaluate your condition, your doctor will examine you, ask questions, and do some tests. Along with looking for signs of heart failure, the doctor looks for any other health problems that may have led to heart failure. The results of your evaluation will help your doctor form a treatment plan.  Health history and physical exam  Your visit will start with a health history. Tell the doctor about any symptoms youve noticed and about all medicines you take. Then youll have a physical exam. This includes listening to your heartbeat and breathing. Youll also be checked for swelling (edema) in your legs and neck. When you have fluid buildup or fluid in the lungs, it may be called congestive heart failure.  Diagnosing heart failure     During an echocardiogram, sound waves bounce off the heart. These are converted into a picture on the screen.   The following may be done to help your doctor form a diagnosis:  · X-rays show the size and shape of your heart. These pictures can also show fluid in your lungs.  · An electrocardiogram (ECG or EKG) shows the pattern of your heartbeat. Small pads (electrodes) are placed on your chest, arms, and legs. Wires connect the pads to the ECG machine, which records your hearts electrical signals. This can give the doctor information about heart function.  · An echocardiogram uses ultrasound waves to show the structure and movement of your heart muscle. This shows how well the heart pumps. It also shows the thickness of the heart walls, and if the heart is enlarged. It is one of the most useful, non-invasive tests as it provides information about the heart's general function. This helps your doctor make treatment decisions.  · Lab  tests evaluate small amounts of blood or urine for signs of problems. A BNP lab test can help diagnose and evaluate heart failure. BNP stands for B-type natriuretic peptide. The ventricles secrete more BNP when heart failure worsens. Lab tests can also provide information about metabolic dysfunction or heart dysfunction.  Your treatment plan  Based on the results of your evaluation and tests, your doctor will develop a treatment plan. This plan is designed to relieve some of your heart failure symptoms and help make you more comfortable. Your treatment plan may include:  · Medicine to help your heart work better and improve your quality of life  · Changes in what you eat and drink to help prevent fluid from backing up in your body  · Daily monitoring of your weight and heart failure symptoms to see how well your treatment plan is working  · Exercise to help you stay healthy  · Help with quitting smoking  · Emotional and psychological support to help adjust to the changes  · Referrals to other specialists to make sure you are being treated comprehensively  Date Last Reviewed: 3/21/2016  © 0356-1135 [employer]. [address] [place] [address], [place] [address]. All rights reserved. This information is not intended as a substitute for professional medical care. Always follow your healthcare professional's instructions.              Heart Failure: Making Changes to Your Diet  You have a condition called heart failure. When you have heart failure, excess fluid is more likely to build up in your body because your heart isn't working well. This makes the heart work harder to pump blood. Fluid buildup causes symptoms such as shortness of breath and swelling (edema). This is often referred to as congestive heart failure or CHF. Controlling the amount of salt (sodium) you eat may help stop fluid from building up. Your doctor may also tell you to reduce the amount of fluid you drink.  Reading food labels    Your  healthcare provider will tell you how much sodium you can eat each day. Read food labels to keep track. Keep in mind that certain foods are high in salt. These include canned, frozen, and processed foods. Check the amount of sodium in each serving. Watch out for high-sodium ingredients. These include MSG (monosodium glutamate), baking soda, and sodium phosphate.   Eating less salt  Give yourself time to get used to eating less salt. It may take a little while. Here are some tips to help:  · Take the saltshaker off the table. Replace it with salt-free herb mixes and spices.  · Eat fresh or plain frozen vegetables. These have much less salt than canned vegetables.  · Choose low-sodium snacks like sodium-free pretzels, crackers, or air-popped popcorn.  · Dont add salt to your food when youre cooking. Instead, season your foods with pepper, lemon, garlic, or onion.  · When you eat out, ask that your food be cooked without added salt.  · Avoid eating fried foods as these often have a great deal of salt.  If youre told to limit fluids  You may need to limit how much fluid you have to help prevent swelling. This includes anything that is liquid at room temperature, such as ice cream and soup. If your doctor tells you to limit fluid, try these tips:  · Measure drinks in a measuring cup before you drink them. This will help you meet daily goals.  · Chill drinks to make them more refreshing.  · Suck on frozen lemon wedges to quench thirst.  · Only drink when youre thirsty.  · Chew sugarless gum or suck on hard candy to keep your mouth moist.  · Weigh yourself daily to know if your body's fluid content is rising.  My sodium goal  Your healthcare provider may give you a sodium goal to meet each day. This includes sodium found in food as well as salt that you add. My goal is to eat no more than ___________ mg of sodium per day.     When to call your doctor  Call your doctor right away if you have any symptoms of worsening  heart failure. These can include:  · Sudden weight gain  · Increased swelling of your legs or ankles  · Trouble breathing when youre resting or at night  · Increase in the number of pillows you have to sleep on  · Chest pain, pressure, discomfort, or pain in the jaw, neck, or back   Date Last Reviewed: 3/21/2016  © 5736-2332 [employer]. [address] [place] [address]. All rights reserved. This information is not intended as a substitute for professional medical care. Always follow your healthcare professional's instructions.              Heart Failure: Medicines to Help Your Heart    You have a condition called heart failure (also known as congestive heart failure, or CHF). Your doctor will likely prescribe medicines for heart failure and any underlying health problems you have. Most heart failure patients take one or more types of medicinen. Your healthcare provider will work to find the combination of medicines that works best for you.  Heart failure medicines  Here are the most common heart failure medicines:  · ACE inhibitors lower blood pressure and decrease strain on the heart. This makes it easier for the heart to pump. Angiotensin receptor blockers have similar effects. These are prescribed for some patients instead of ACE inhibitors.  · Beta-blockers relieve stress on the heart. They also improve symptoms. They may also improve the heart's pumping action over time.  · Diuretics (also called water pills) help rid your body of excess water. This can help rid your body of swelling (edema). Having less fluid to pump means your heart doesnt have to work as hard. Some diuretics make your body lose a mineral called potassium. Your doctor will tell you if you need to take supplements or eat more foods high in potassium.  · Digoxin helps your heart pump with more strength. This helps your heart pump more blood with each beat. So, more oxygen-rich blood travels to the rest of the  body.  · Aldosterone antagonists help alter hormones and decrease strain on the heart.  · Hydralazine and nitrates are two separate medicines used together to treat heart failure. They may come in one combination pill. They lower blood pressure and decrease how hard the heart has to pump.  Medicines for related conditions  Controlling other heart problems helps keep heart failure under control, too. Depending on other heart problems you have, medicines may be prescribed to:  · Lower blood pressure (antihypertensives).  · Lower cholesterol levels (statins).  · Prevent blood clots (anticoagulants or aspirin).  · Keep the heartbeat steady (antiarrhythmics).  Date Last Reviewed: 3/5/2016  © 4893-8201 [employer]. [address] [place] [address], [place] [address]. All rights reserved. This information is not intended as a substitute for professional medical care. Always follow your healthcare professional's instructions.              Heart Failure: Procedures That May Help    The heart is a muscle that pumps oxygen-rich blood to all parts of the body. When you have heart failure, the heart is not able to pump as well as it should. Blood and fluid may back up into the lungs (congestive heart failure), and some parts of the body dont get enough oxygen-rich blood to work normally. These problems lead to the symptoms of heart failure.     Certain procedures may help the heart pump better in some cases of heart failure. Some procedures are done to treat health problems that may have caused the heart failure such as coronary artery disease or heart rhythm problems. For more serious heart failure, other options are available.  Treating artery and valve problems  If you have coronary artery disease or valve disease, procedures may be done to improve blood flow. This helps the heart pump better, which can improve heart failure symptoms. First, your doctor may do a cardiac catheterization to help detect clogged blood  vessels or valve damage. During this procedure, a  thin tube (catheter) in inserted into a blood vessel and guided to the heart. There a dye is injected and a special type of X-ray (angiogram) is taken of the blood vessels. Procedures to open a blocked artery or fix damaged valves can also be done using catheterization.  · Angioplasty uses a balloon-tipped instrument at the end of the catheter. The balloon is inflated to widen the narrowed artery. In many cases, a stent is expanded to further support the narrowed artery. A stent is a metal mesh tube.  · Valve surgery repairs or replacement of faulty valves can also be done during catheterization so blood can flow properly through the chambers of the heart.  Bypass surgery is another option to help treat blocked arteries. It uses a healthy blood vessel from elsewhere in the body. The healthy blood vessel is attached above and below the blocked area so that blood can flow around the blocked artery.  Treating heart rhythm problems  A device may be placed in the chest to help a weak heart maintain a healthy, heartbeat so the heart can pump more effectively:  · Pacemaker. A pacemaker is an implanted device that regulates your heartbeat electronically. It monitors your heart's rhythm and generates a painless electric impulse that helps the heart beat in a regular rhythm. A pacemaker is programmed to meet your specific heart rhythm needs.  · Biventricular pacing/cardiac resynchronization therapy. A type of pacemaker that paces both pumping chambers of the heart at the same time to coordinate contractions and to improve the heart's function. Some people with heart failure are candidates for this therapy.  · Implantable cardioverter defibrillator. A device similar to a pacemaker that senses when the heart is beating too fast and delivers an electrical shock to convert the fast rhythm to a normal rhythm. This can be a life saving device.  In severe cases  In more serious  cases of heart failure when other treatments no longer work, other options may include:  · Ventricular assist devices (VADs). These are mechanical devices used to take over the pumping function for one or both of the heart's ventricles, or pumping chambers. A VAD may be necessary when heart failure progresses to the point that medicines and other treatments no longer help. In some cases, a VAD may be used as a bridge to transplant.  · Heart transplant. This is replacing the diseased heart with a healthy one from a donor. This is an option for a few people who are very sick. A heart transplant is very serious and not an option for all patients. Your doctor can tell you more.  Date Last Reviewed: 3/20/2016  © 3024-7975 [employer] (acquired by [employer] in 2014). [address] [place] [address], [place] [address]. All rights reserved. This information is not intended as a substitute for professional medical care. Always follow your healthcare professional's instructions.              Heart Failure: Tracking Your Weight  You have a condition called heart failure. When you have heart failure, a sudden weight gain or a steady rise in weight is a warning sign that your body is retaining too much water and salt. This could mean your heart failure is getting worse. If left untreated, it can cause problems for your lungs and result in shortness of breath. Weighing yourself each day is the best way to know if youre retaining water. If your weight goes up quickly, call your doctor. You will be given instructions on how to get rid of the excess water. You will likely need medicines and to avoid salt. This will help your heart work better.  Call your doctor if you gain more than 2 pounds in 1 day, more than 5 pounds in 1 week, or whatever weight gain you were told to report by your doctor. This is often a sign of worsening heart failure and needs to be evaluated and treated. Your doctor will tell you what to do next.   Tips for weighing  yourself    · Weigh yourself at the same time each morning, wearing the same clothes. Weigh yourself after urinating and before eating.  · Use the same scale each day. Make sure the numbers are easy to read. Put the scale on a flat, hard surface -- not on a rug or carpet.  · Do not stop weighing yourself. If you forget one day, weigh again the next morning.  How to use your weight chart  · Keep your weight chart near the scale. Write your weight on the chart as soon as you get off the scale.  · Fill in the month and the start date on the chart. Then write down your weight each day. Your chart will look like this:    · If you miss a day, leave the space blank. Weigh yourself the next day and write your weight in the next space.  · Take your weight chart with you when you go to see your doctor.  Date Last Reviewed: 3/20/2016  © 7407-4406 [employer]. [address] [place] [address], [place] [address]. All rights reserved. This information is not intended as a substitute for professional medical care. Always follow your healthcare professional's instructions.              Heart Failure: Warning Signs of a Flare-Up  You have a condition called heart failure. Once you have heart failure, flare-ups can happen. Below are signs that can mean your heart failure is getting worse. If you notice any of these warning signs, call your healthcare provider.  Swelling    · Your feet, ankles, or lower legs get puffier.  · You notice skin changes on your lower legs.  · Your shoes feel too tight.  · Your clothes are tighter in the waist.  · You have trouble getting rings on or off your fingers.  Shortness of breath  · You have to breathe harder even when youre doing your normal activities or when youre resting.  · You are short of breath walking up stairs or even short distances.  · You wake up at night short of breath or coughing.  · You need to use more pillows or sit up to sleep.  · You wake up tired or restless.  Other  warning signs  · You feel weaker, dizzy, or more tired.  · You have chest pain or changes in your heartbeat.  · You have a cough that wont go away.  · You cant remember things or dont feel like eating.  Tracking your weight  Gaining weight is often the first warning sign that heart failure is getting worse. Gaining even a few pounds can be a sign that your body is retaining excess water and salt. Weighing yourself each day in the morning after you urinate and before you eat, is the best way to know if you're retaining water. Get a scale that is easy to read and make sure you wear the same clothes and use the same scale every time you weigh. Your healthcare provider will show you how to track your weight. Call your doctor if you gain more than 2 pounds in 1 day, 5 pounds in 1 week, or whatever weight gain you were told to report by your doctor. This is often a sign of worsening heart failure and needs to be evaluated and treated before it compromises your breathing. Your doctor will tell you what to do next.    Date Last Reviewed: 3/15/2016  © 2327-5637 [employer]. [address] [place] [address], [place] [address]. All rights reserved. This information is not intended as a substitute for professional medical care. Always follow your healthcare professional's instructions.              Pneumonmia Discharge Instructions                Coumadin Discharge Instructions                         Chronic Kindey Disease Education              [facility] complies with applicable Federal civil rights laws and does not discriminate on the basis of race, color, national origin, age, disability, or sex.

## 2017-05-15 NOTE — PROGRESS NOTES
Pt DC'd per MD order. Discharge instructions given including activity, wound care, S&S of infections, future appointments, and when to call MD. Medications reviewed including when to take next dose. PIV DC'd, catheter tip intact. Pt refused transport and ambulated off unit with .

## 2017-05-15 NOTE — OP NOTE
[facility]  Vascular Surgery  Operative Note    SUMMARY     Date of Procedure: 5/15/2017     Procedure:  Left upper extremity fistulogram    PTA outflow stenosis (8x60 mustang); Stent outflow stenosis (8x50 Viabahn)    Conscious sedation, 30 minutes    Surgeon(s) and Role:     * Torrey Villafana MD - Primary        Karin Levy DO- Fellow    Assisting Surgeon: None    Pre-Operative Diagnosis: Complication of AV dialysis fistula, subsequent encounter [T82.9XXD]; Venous stenosis    Post-Operative Diagnosis: same    Anesthesia: RN IV Sedation    Indication for operation: 57 yo F with ESRD on HD with LUE AVG with evidence of outflow venous stenosis on ultrasound.     Description of the Findings of the Procedure: high grade venous outflow stenosis improved after PTA and stent; moderate in-stent re-stenosis resolved with PTA; good thrill in AVG, 1+ radial pulse      Complications: No    Estimated Blood Loss (EBL): 10 mL           Implants: 8x50 Viabhan    Specimens:   Specimen     None                  Condition: Good    Disposition: PACU - hemodynamically stable.    Operation in detail: Operation in detail: After an informed consent was obtained the patient was taken to the cath suite and placed in the supine position.  The left arm was prepped and draped in the standard surgical fashion. Using palpation, the proximal aspect of the left upper extremity AVG was accessed with a micropuncture needle and wire followed by placement of 4/3-Barbadian micropuncture dilator. Through this, an J wire was placed followed by a short 6-Barbadian sheath. A high grade outflow venous stenosis was demosntrated on fistulogram and moderate in stent re-stenosis in outflow vein stent. The high-grade stenosis in venous outflow was found, which was crossed with a hyrophilic 0.035-in glidewire. This was treated with a 8x60 high-pressure, balloon. The in stent re-stenosis was also treated with a 8x60 [place] balloon.  Resolution of the stenosis was noted. Decision made to place stent across high grade venous outflow stenosis.  8x50 Viabahn was deployed across prior high grade venous outflow stenosis followed by PTA with the 8x60 mm [place] balloon.  Central venogram was performed demonstrating mild stenosis distal edge of prior brachial vein stent; this was treated with the 8x60 mm balloon. Follow up fistulogram demonstrated improvement of these stenoses. Strong thrill could be felt. The sheath was removed, 3-0 nylon U-stitch was placed with good hemostasis.

## 2017-05-19 ENCOUNTER — TELEPHONE (OUTPATIENT)
Dept: ELECTROPHYSIOLOGY | Facility: CLINIC | Age: 57
End: 2017-05-19

## 2017-05-19 NOTE — TELEPHONE ENCOUNTER
Attempted to contact the patient on this morning in relation to remote transmissions received via her Remote ICD home monitor.  Left a message on listed # [phone] for the patient to contact the clinic.

## 2017-05-22 ENCOUNTER — ANTI-COAG VISIT (OUTPATIENT)
Dept: CARDIOLOGY | Facility: CLINIC | Age: 57
End: 2017-05-22
Payer: MEDICARE

## 2017-05-22 DIAGNOSIS — I48.19 PERSISTENT ATRIAL FIBRILLATION: ICD-10-CM

## 2017-05-22 DIAGNOSIS — Z79.01 LONG-TERM (CURRENT) USE OF ANTICOAGULANTS: Primary | ICD-10-CM

## 2017-05-22 LAB
CTP QC/QA: YES
INR PPP: 1.7 (ref 2–3)
PROTHROMBIN TIME, POC: NORMAL (ref 2–3)

## 2017-05-22 PROCEDURE — 99211 OFF/OP EST MAY X REQ PHY/QHP: CPT | Mod: 25,S$GLB,,

## 2017-05-22 PROCEDURE — 85610 PROTHROMBIN TIME: CPT | Mod: QW,S$GLB,, | Performed by: INTERNAL MEDICINE

## 2017-05-22 NOTE — PROGRESS NOTES
"INR is sub therapeutic today. She has eaten more greens this past week than usual which is the likely cause of her INR. I stressed the importance of consistancy with her diet and re-educated her on the foods that will affect her INR. Per patient, she will likely not have as many greens this week, but it "just depends". I will boost her today and continue her weekly warfarin regimen until follow up.  Patient advised to contact clinic with any changes, questions, or concerns.   "

## 2017-05-22 NOTE — PROGRESS NOTES
I agree with the resident's plan of care. She has a new refill on prednisone 10mg daily which is not new for her.

## 2017-05-26 ENCOUNTER — HOSPITAL ENCOUNTER (OUTPATIENT)
Dept: VASCULAR SURGERY | Facility: CLINIC | Age: 57
Discharge: HOME | End: 2017-05-26
Attending: SURGERY
Payer: MEDICARE

## 2017-05-26 ENCOUNTER — OFFICE VISIT (OUTPATIENT)
Dept: VASCULAR SURGERY | Facility: CLINIC | Age: 57
End: 2017-05-26
Payer: MEDICARE

## 2017-05-26 VITALS
HEART RATE: 68 BPM | SYSTOLIC BLOOD PRESSURE: 102 MMHG | DIASTOLIC BLOOD PRESSURE: 68 MMHG | HEIGHT: 68 IN | BODY MASS INDEX: 41.37 KG/M2 | WEIGHT: 273 LBS | TEMPERATURE: 97 F

## 2017-05-26 DIAGNOSIS — T82.858D AV GRAFT STENOSIS, SUBSEQUENT ENCOUNTER: Primary | ICD-10-CM

## 2017-05-26 DIAGNOSIS — N18.6 ESRD ON HEMODIALYSIS: Chronic | ICD-10-CM

## 2017-05-26 DIAGNOSIS — Z99.2 ESRD ON HEMODIALYSIS: Chronic | ICD-10-CM

## 2017-05-26 PROCEDURE — 99499 UNLISTED E&M SERVICE: CPT | Mod: S$GLB,,, | Performed by: SURGERY

## 2017-05-26 PROCEDURE — 99214 OFFICE O/P EST MOD 30 MIN: CPT | Mod: S$GLB,,, | Performed by: SURGERY

## 2017-05-26 PROCEDURE — 99999 PR PBB SHADOW E&M-EST. PATIENT-LVL III: CPT | Mod: PBBFAC,,, | Performed by: SURGERY

## 2017-05-26 PROCEDURE — 93990 DOPPLER FLOW TESTING: CPT | Mod: S$GLB,,, | Performed by: SURGERY

## 2017-05-26 RX ORDER — CINACALCET HYDROCHLORIDE 30 MG/1
TABLET, COATED ORAL
Status: ON HOLD | COMMUNITY
Start: 2017-05-23 | End: 2018-08-22

## 2017-05-26 NOTE — DISCHARGE SUMMARY
[facility]  Discharge Summary  Vascular Surgery      Admit Date: 5/15/2017    Discharge Date and Time: 5/15/2017  9:42 AM    Attending Physician: TRICE Villafana MD      Discharge Provider: Chani Castillo    Reason for Admission: AVF malfunction     Procedures Performed: Procedure(s) (LRB):  FISTULOGRAM (Left)    Hospital Course (synopsis of major diagnoses, care, treatment, and services provided during the course of the hospital stay): 57 yo F with ESRD on HD with LUE AVG with evidence of outflow venous stenosis on ultrasound. Patient taken to cath lab and  Underwent Fistulogrma with PTA & stent of outflow stenosis. Patient tolerated procedure and was discharged      Consults: none    Significant Diagnostic Studies: Angiography: see op note     Final Diagnoses:    Principal Problem: ESRD on dialysis   Secondary Diagnoses:   Active Hospital Problems    Diagnosis  POA    *ESRD on dialysis [N18.6, Z99.2]  Not Applicable      Resolved Hospital Problems    Diagnosis Date Resolved POA   No resolved problems to display.       Discharged Condition: good    Disposition: Home or Self Care    Follow Up/Patient Instructions:     Medications:  Reconciled Home Medications:   Discharge Medication List as of 5/15/2017  9:01 AM      CONTINUE these medications which have NOT CHANGED    Details   ACZONE 5 % topical gel Apply to face every morning, Starting 1/6/2016, Until Discontinued, Historical Med      ammonium lactate (LAC-HYDRIN) 12 % lotion Apply topically as needed (to legs). , Starting 1/5/2016, Until Discontinued, Historical Med      atorvastatin (LIPITOR) 80 MG tablet TAKE 1 TABLET(80 MG) BY MOUTH EVERY DAY, Normal      cholecalciferol, vitamin D3, 1,000 unit capsule Take 1 capsule (1,000 Units total) by mouth once daily., Starting 3/8/2017, Until Discontinued, No Print      diclofenac sodium 1 % Gel Apply 2 g topically every 8 (eight) hours as needed., Starting 2/16/2017, Until Sat 4/8/17, Normal       digoxin (LANOXIN) 125 mcg tablet Take 1 tablet (0.125 mg total) by mouth every other day., Starting 4/8/2017, Until Sun 4/8/18, Print      econazole nitrate 1 % cream Apply topically 2 (two) times daily. As needed for fungal skin infection - use for 2-4 weeks, Starting 4/28/2017, Until Discontinued, Normal      fludrocortisone (FLORINEF) 0.1 mg Tab Take 100 mcg by mouth 2 (two) times daily., Until Discontinued, Historical Med      gabapentin (NEURONTIN) 300 MG capsule TAKE ONE CAPSULE BY MOUTH THREE TIMES DAILY, Normal      inulin-sorbitol 2 gram Chew Take 2 tablets by mouth 2 (two) times daily., Starting 2/11/2017, Until Discontinued, OTC      ketorolac 0.5% (ACULAR) 0.5 % Drop instill one drop into both eyes every morning, Historical Med      levetiracetam (KEPPRA) 500 MG Tab Take 1 tablet (500 mg total) by mouth 2 (two) times daily., Starting 4/7/2017, Until Discontinued, Normal      lidocaine (LIDODERM) 5 % Place 2 patches onto the skin once daily. One on each foot. Remove & Discard patch within 12 hours or as directed by MD, Starting 5/1/2017, Until Discontinued, Normal      !! midodrine (PROAMATINE) 5 MG Tab Take 2 tablets (10 mg total) by mouth every Mon, Wed, Fri, Sun., Starting 2/19/2017, Until Mon 2/19/18, Normal      !! midodrine (PROAMATINE) 5 MG Tab Take 3 tablets (15 mg total) by mouth every Tues, Thurs, Sat. Take 2 tablets (10 mg total) by mouth on non dialysis days., Starting 2/21/2017, Until Discontinued, No Print      omeprazole (PRILOSEC) 20 MG capsule Take 1 capsule (20 mg total) by mouth once daily., Starting 1/20/2017, Until Sat 1/20/18, Normal      polyethylene glycol (GLYCOLAX) 17 gram/dose powder Take 17 g by mouth once daily. Dissolve in juice., Starting 2/11/2017, Until Discontinued, Normal      prednisoLONE acetate (PRED FORTE) 1 % DrpS INSTILL ONE DROP INTO  LEFT EYE THREE TIMES A DAY, Historical Med      predniSONE (DELTASONE) 10 MG tablet TAKE 1 TABLET BY MOUTH EVERY DAY WITH  BREAKFAST, Normal      RENVELA 800 mg Tab TAKE 1 TABLET BY MOUTH THREE TIMES DAILY WITH MEALS, Normal      tizanidine 4 mg Cap Take 1 capsule by mouth daily as needed., Starting 4/23/2017, Until Discontinued, Normal      warfarin (COUMADIN) 5 MG tablet Take 1 tablet (5 mg total) by mouth Daily. Per [facility], Starting 4/3/2017, Until Discontinued, Normal       !! - Potential duplicate medications found. Please discuss with provider.      STOP taking these medications       docusate sodium (COLACE) 100 MG capsule Comments:   Reason for Stopping:         lactulose (CHRONULAC) 20 gram/30 mL Soln Comments:   Reason for Stopping:               Discharge Procedure Orders  VAS US Hemodialysis Access   Standing Status: Future Number of Occurrences: 1 Standing Exp. Date: 05/15/18     Diet general     Activity as tolerated     Call MD for:  temperature >100.4     Call MD for:  severe uncontrolled pain     Call MD for:  redness, tenderness, or signs of infection (pain, swelling, redness, odor or green/yellow discharge around incision site)     Remove dressing in 24 hours       Follow-up Information     Torrey Villafana MD.    Specialty:  Vascular Surgery  Why:  Friday for suture removal;  hemodialysis  Contact information:  [address]  [facility] [address]  [phone]                   Vascular lab studies prior to follow-up appointment: Duplex Ultrasound

## 2017-05-29 ENCOUNTER — TELEPHONE (OUTPATIENT)
Dept: ELECTROPHYSIOLOGY | Facility: CLINIC | Age: 57
End: 2017-05-29

## 2017-05-29 DIAGNOSIS — I48.21 PERMANENT ATRIAL FIBRILLATION: Primary | Chronic | ICD-10-CM

## 2017-05-29 NOTE — PROGRESS NOTES
Sisi Medel  10/07/2016    HPI:  Patient is a 55 y.o.  female with a h/o HTN, HLD, CHF (EF 20%), COPD on Home O2, Paroxysmal Afib (On Coumadin anticoagulation), Seizure disorder, ESRD on HD via LUE AVG who is here for evaluation of her LUE upper arm AV graft (placed by Dr. Villafana 2/3/16) following declot of graft on 2/7/17. She underwent (PTA outflow stenosis (8x60 mustang); Stent outflow stenosis (8x50 Viabahn)) 5/2017. She has been doing well since, using her LUE AVG without issue. No problems with accessing, with flow or with deaccessing.     S/p   2/3/16: LUE AVG creation (Dr Villafana)  6/21/16: Percutaneous mechanical thrombectomy w Possis Angiojet AVX; 4fr OTW embolectomy of arterial inflow   PTA outflow stenosis with a 7x40 mm balloon  10/12/16: fistulogram (75% stenosis noted), left upper extremity AV graft PTA venous outflow with resolution of stenosis noted  2/2017 Declot and venous outflow PTA and stent with 8 x 50 VIABAHN  5/15/2017: PTA outflow stenosis (8x60 mustang); Stent outflow stenosis (8x50 Viabahn)    Findings/Key Components:   Successful treatment venous outflow stenosis  Good palpable thrill     No  Prior  MI/stroke  Tobacco use: Former smoker     Past Medical History   Diagnosis Date    Anemia in ESRD (end-stage renal disease) 3/7/2016    Cervical radiculopathy 7/20/2015    Chronic combined systolic and diastolic heart failure 6/14/2013     EF 20% 2013, improved with Medical therapy, negative PET 2013    Chronic respiratory failure with hypoxia 04/22/2013     On home oxygen at 2-5 liters    Chronic rhinitis 10/2/2013    DDD (degenerative disc disease), lumbar 6/17/2015    ESRD on hemodialysis 2/23/2016    Essential hypertension     Gout     Hyperlipidemia 3/7/2016    Lateral meniscal tear 5/31/2016    Lumbar stenosis 6/17/2015    Morbid obesity 8/15/2013    Paroxysmal atrial fibrillation 5/16/2014     Not on anticoagulation    Peripheral neuropathy  2013    Personal history of fall 2014    Personal history of gastric ulcer 3/19/2013    Sarcoidosis, lung 2009     Diagnosed in  with ocular manifestation, on 4L home O2 and PO steroids    Secondary pulmonary hypertension 2015    Seizure disorder 3/19/2013    Shingles 2013    Spondylarthrosis 2015     Past Surgical History   Procedure Laterality Date     section, classic      Abdominal surgery      Vascular surgery       Family History   Problem Relation Age of Onset    Kidney failure Mother     Hypertension Mother     Diabetes Mother     Coronary artery disease Father     Hypertension Father     Diabetes Father     Lupus Sister     Diabetes Maternal Grandmother     Asthma Neg Hx     Emphysema Neg Hx     Melanoma Neg Hx     Psoriasis Neg Hx      Social History     Social History    Marital status: Single     Spouse name: N/A    Number of children: N/A    Years of education: N/A     Occupational History    Not on file.     Social History Main Topics    Smoking status: Former Smoker     Packs/day: 0.50     Years: 10.00     Types: Cigarettes     Quit date: 1994    Smokeless tobacco: Never Used    Alcohol use No    Drug use: No    Sexual activity: No     Other Topics Concern    Not on file     Social History Narrative    Lives with boyfriend/partner who helps with her care.     Plans to travel to [place] for 2 weeks in 2016     Current Outpatient Prescriptions on File Prior to Visit   Medication Sig    ACZONE 5 % topical gel Apply topically once daily.     allopurinol (ZYLOPRIM) 100 MG tablet Take 1 tablet (100 mg total) by mouth once daily.    amiodarone (PACERONE) 200 MG Tab Take 1 tablet (200 mg total) by mouth every morning.    ammonium lactate (LAC-HYDRIN) 12 % lotion Apply topically as needed (for scars on arms).     aspirin 81 MG Chew Take 81 mg by mouth every morning.    atorvastatin (LIPITOR) 80 MG tablet Take 80 mg by mouth once  daily.     capsicum 0.075% (ZOSTRIX) 0.075 % topical cream Apply topically 3 (three) times daily. For neuropathic pain of feet    gabapentin (NEURONTIN) 100 MG capsule TAKE ONE CAPSULE BY MOUTH THREE TIMES DAILY (Patient taking differently: TAKE ONE CAPSULE BY MOUTH THREE TIMES DAILY-noon, evening, bedtime)    levetiracetam (KEPPRA) 500 MG Tab TAKE 1 TABLET BY MOUTH TWICE DAILY.    melatonin 5 mg Tab Take 1 tablet by mouth nightly.    midodrine (PROAMATINE) 10 MG tablet     midodrine (PROAMATINE) 5 MG Tab     NEPHRO-LINDA 0.8 mg Tab TK 1 T PO  QD    ondansetron (ZOFRAN-ODT) 8 MG TbDL Take 1 tablet (8 mg total) by mouth every 8 (eight) hours as needed.    oxycodone-acetaminophen (PERCOCET) 7.5-325 mg per tablet Take 1 tablet by mouth every 4 (four) hours as needed.    predniSONE (DELTASONE) 10 MG tablet TAKE 3 TABLETS BY MOUTH FOR 7 DAYS, THEN 2 TABLETS FOR 7 DAYS, THEN 1 TABLET EVERY DAY AFTER.    RENVELA 800 mg Tab TAKE 1 TABLET BY MOUTH THREE TIMES DAILY WITH MEALS    tizanidine 4 mg Cap Take 4 mg by mouth 2 (two) times daily as needed. (Patient taking differently: Take 4 mg by mouth daily as needed (for muscle spasms.). )    warfarin (COUMADIN) 5 MG tablet TAKE 1 TABLET(5 MG) BY MOUTH DAILY     No current facility-administered medications on file prior to visit.        REVIEW OF SYSTEMS:  General: negative; ENT: negative; Allergy and Immunology: negative; Hematological and Lymphatic: Negative; Endocrine: negative; Respiratory: no cough, shortness of breath, or wheezing; Cardiovascular: no chest pain or dyspnea on exertion; Gastrointestinal: no abdominal pain/back, change in bowel habits, or bloody stools; Genito-Urinary: no dysuria, trouble voiding, or hematuria; Musculoskeletal: no pain, numbness, to LUE  Neurological: no TIA or stroke symptoms; Psychiatric: no nervousness, anxiety or depression.    PHYSICAL EXAM:   Vitals:    07/14/16 1532   BP: (!) 81/57   Pulse: (!) 52   Temp: 98.9 °F (37.2 °C)  "      General appearance:  Alert, well-appearing, and in no distress.  Oriented to person, place, and time   Neurological: Normal speech, no focal findings noted; CN II - XII grossly intact           Musculoskeletal: Digits/nail without cyanosis/clubbing.  Normal muscle strength/tone.                 Neck: Supple, no significant adenopathy; thyroid is not enlarged                Chest:  Clear to auscultation, symmetric air entry     No use of accessory muscles             Cardiac: Normal rate and regular rhythm, S1 and S2 normal; PMI non-displaced          Abdomen: Soft, nontender, nondistended, no masses or organomegaly     no rebound tenderness noted      Extremities:   LUE AVG with palpable thrill, no increased pulsatility, 2+ distal radial pulse; LUE slightly swollen compared to right which patient reports is "always like that".       LAB RESULTS:  Lab Results   Component Value Date    K 4.5 07/12/2016    K 4.6 07/04/2016    K 5.1 07/03/2016    CREATININE 6.5 (H) 07/12/2016    CREATININE 10.2 (H) 07/04/2016    CREATININE 7.6 (H) 07/03/2016     Lab Results   Component Value Date    WBC 11.09 07/12/2016    WBC 10.38 07/04/2016    WBC 11.01 07/03/2016    HCT 37.5 07/12/2016    HCT 31.4 (L) 07/04/2016    HCT 30.1 (L) 07/03/2016     07/12/2016     07/04/2016     07/03/2016     Lab Results   Component Value Date    HGBA1C 5.7 05/17/2016    HGBA1C 6.7 (H) 10/15/2015    HGBA1C 5.5 06/15/2015     IMAGING:             Velocities: in cm./sec  Inflow- 140  arterial anastomosis- 383  proximal graft- 198  mid graft- 246  distal graft- 219  venous anastomosis- 105  venous outflow- 134                 volume flow: in ml.min.  1857.     Overall Impression:  Color flow duplex exam reveals a patent left upper extremity AV graft. There is no evidence of a focal hemodynamically significant stenosis. Volume flow at mid bicep level measures 1857 ml./min.    Overall Impression:  Color flow duplex exam reveals a " patent left upper extremity AV graft. There is noted a visual narrowing and elevated PSVs  of 608 cm./sec at the level fo the venous anastomosis and extending into the venous outflow. Th is finding is consistent with a   focal hemodynamically significant stenosis > 75%. Volume flow at mid bicep level measures 585 ml./min.    Overall Impression:  No evidence of focal hemodynamically significant stenosis      IMP/PLAN:  55 y.o. female with HTN, HLD, CHF (EF 20%), COPD on Home O2, Paroxysmal Afib (On Coumadin anticoagulation), Seizure disorder, ESRD on HD via LUE AVG who is here today for evaluation of her LUE upper arm AV graft (placed by Dr. Villafana 2/3/16) doing well with no complaints.   She is dialyzing well, with good flow in her AVG.       1) RTC 3 months with AVG duplex or sooner if problems arise     Torrey Villafana MD  Vascular & Endovascular Surgery

## 2017-06-02 ENCOUNTER — ANTI-COAG VISIT (OUTPATIENT)
Dept: CARDIOLOGY | Facility: CLINIC | Age: 57
End: 2017-06-02
Payer: MEDICARE

## 2017-06-02 DIAGNOSIS — I48.19 PERSISTENT ATRIAL FIBRILLATION: ICD-10-CM

## 2017-06-02 DIAGNOSIS — Z79.01 LONG-TERM (CURRENT) USE OF ANTICOAGULANTS: Primary | ICD-10-CM

## 2017-06-02 LAB — INR PPP: 2.4 (ref 2–3)

## 2017-06-02 PROCEDURE — 85610 PROTHROMBIN TIME: CPT | Mod: QW,S$GLB,, | Performed by: INTERNAL MEDICINE

## 2017-06-02 PROCEDURE — 99211 OFF/OP EST MAY X REQ PHY/QHP: CPT | Mod: 25,S$GLB,,

## 2017-06-02 NOTE — PROGRESS NOTES
Today was a quick follow up for subtherapeutic INR.  INR therapeutic today.  Patient reports eating greens once last week.  She reports so far this week only eating mixed vegetables.  Reviewed importance of consistent vit k intake for INR stability.  Encouraged to aim for greens once/week.  She also reports restarting aspirin per her physician's recommendation.  Continue warfarin dose.  Repeat INR next week with prelabs for AV node RFA 6/12.  Patient advised to contact clinic with any changes, questions, or concerns.

## 2017-06-08 ENCOUNTER — LAB VISIT (OUTPATIENT)
Dept: LAB | Facility: HOSPITAL | Age: 57
End: 2017-06-08
Attending: INTERNAL MEDICINE
Payer: MEDICARE

## 2017-06-08 ENCOUNTER — ANTI-COAG VISIT (OUTPATIENT)
Dept: CARDIOLOGY | Facility: CLINIC | Age: 57
End: 2017-06-08

## 2017-06-08 DIAGNOSIS — I48.21 PERMANENT ATRIAL FIBRILLATION: Chronic | ICD-10-CM

## 2017-06-08 DIAGNOSIS — I48.19 PERSISTENT ATRIAL FIBRILLATION: ICD-10-CM

## 2017-06-08 DIAGNOSIS — Z79.01 LONG-TERM (CURRENT) USE OF ANTICOAGULANTS: ICD-10-CM

## 2017-06-08 LAB
ANION GAP SERPL CALC-SCNC: 15 MMOL/L
APTT BLDCRRT: 36.3 SEC
BASOPHILS # BLD AUTO: 0.03 K/UL
BASOPHILS NFR BLD: 0.3 %
BUN SERPL-MCNC: 16 MG/DL
CALCIUM SERPL-MCNC: 9.7 MG/DL
CHLORIDE SERPL-SCNC: 94 MMOL/L
CO2 SERPL-SCNC: 32 MMOL/L
CREAT SERPL-MCNC: 4.2 MG/DL
DIFFERENTIAL METHOD: ABNORMAL
EOSINOPHIL # BLD AUTO: 0.1 K/UL
EOSINOPHIL NFR BLD: 1.4 %
ERYTHROCYTE [DISTWIDTH] IN BLOOD BY AUTOMATED COUNT: 15.4 %
EST. GFR  (AFRICAN AMERICAN): 13 ML/MIN/1.73 M^2
EST. GFR  (NON AFRICAN AMERICAN): 11 ML/MIN/1.73 M^2
GLUCOSE SERPL-MCNC: 137 MG/DL
HCT VFR BLD AUTO: 45.5 %
HGB BLD-MCNC: 14.2 G/DL
INR PPP: 3.2
LYMPHOCYTES # BLD AUTO: 2 K/UL
LYMPHOCYTES NFR BLD: 19.2 %
MCH RBC QN AUTO: 29.5 PG
MCHC RBC AUTO-ENTMCNC: 31.2 %
MCV RBC AUTO: 95 FL
MONOCYTES # BLD AUTO: 1 K/UL
MONOCYTES NFR BLD: 9.6 %
NEUTROPHILS # BLD AUTO: 7.1 K/UL
NEUTROPHILS NFR BLD: 69 %
PLATELET # BLD AUTO: 210 K/UL
PMV BLD AUTO: 10.4 FL
POTASSIUM SERPL-SCNC: 3.6 MMOL/L
PROTHROMBIN TIME: 34.5 SEC
RBC # BLD AUTO: 4.81 M/UL
SODIUM SERPL-SCNC: 141 MMOL/L
WBC # BLD AUTO: 10.21 K/UL

## 2017-06-08 PROCEDURE — 36415 COLL VENOUS BLD VENIPUNCTURE: CPT

## 2017-06-08 PROCEDURE — 80048 BASIC METABOLIC PNL TOTAL CA: CPT

## 2017-06-08 PROCEDURE — 85610 PROTHROMBIN TIME: CPT

## 2017-06-08 PROCEDURE — 85730 THROMBOPLASTIN TIME PARTIAL: CPT

## 2017-06-08 PROCEDURE — 85025 COMPLETE CBC W/AUTO DIFF WBC: CPT

## 2017-06-12 ENCOUNTER — HOSPITAL ENCOUNTER (OUTPATIENT)
Facility: HOSPITAL | Age: 57
Discharge: HOME | End: 2017-06-13
Attending: INTERNAL MEDICINE | Admitting: INTERNAL MEDICINE
Payer: MEDICARE

## 2017-06-12 ENCOUNTER — ANESTHESIA EVENT (OUTPATIENT)
Dept: MEDSURG UNIT | Facility: HOSPITAL | Age: 57
End: 2017-06-12
Payer: MEDICARE

## 2017-06-12 ENCOUNTER — ANESTHESIA (OUTPATIENT)
Dept: MEDSURG UNIT | Facility: HOSPITAL | Age: 57
End: 2017-06-12
Payer: MEDICARE

## 2017-06-12 ENCOUNTER — SURGERY (OUTPATIENT)
Age: 57
End: 2017-06-12

## 2017-06-12 DIAGNOSIS — N18.6 ESRD ON HEMODIALYSIS: Chronic | ICD-10-CM

## 2017-06-12 DIAGNOSIS — Z99.2 ESRD ON HEMODIALYSIS: Chronic | ICD-10-CM

## 2017-06-12 DIAGNOSIS — J96.10 CHRONIC RESPIRATORY FAILURE, UNSPECIFIED WHETHER WITH HYPOXIA OR HYPERCAPNIA: ICD-10-CM

## 2017-06-12 DIAGNOSIS — D86.9 SARCOID: ICD-10-CM

## 2017-06-12 DIAGNOSIS — I48.19 PERSISTENT ATRIAL FIBRILLATION: ICD-10-CM

## 2017-06-12 DIAGNOSIS — I48.91 ATRIAL FIBRILLATION: ICD-10-CM

## 2017-06-12 DIAGNOSIS — I48.21 PERMANENT ATRIAL FIBRILLATION: Chronic | ICD-10-CM

## 2017-06-12 LAB
ANION GAP SERPL CALC-SCNC: 13 MMOL/L
BUN SERPL-MCNC: 50 MG/DL
CALCIUM SERPL-MCNC: 9.4 MG/DL
CHLORIDE SERPL-SCNC: 99 MMOL/L
CO2 SERPL-SCNC: 26 MMOL/L
CREAT SERPL-MCNC: 8.3 MG/DL
EST. GFR  (AFRICAN AMERICAN): 5.7 ML/MIN/1.73 M^2
EST. GFR  (NON AFRICAN AMERICAN): 4.9 ML/MIN/1.73 M^2
GLUCOSE SERPL-MCNC: 140 MG/DL
INR PPP: 1.6
POTASSIUM SERPL-SCNC: 5.2 MMOL/L
PROTHROMBIN TIME: 16.4 SEC
SODIUM SERPL-SCNC: 138 MMOL/L

## 2017-06-12 PROCEDURE — 80048 BASIC METABOLIC PNL TOTAL CA: CPT

## 2017-06-12 PROCEDURE — 25000003 PHARM REV CODE 250: Performed by: INTERNAL MEDICINE

## 2017-06-12 PROCEDURE — 85610 PROTHROMBIN TIME: CPT

## 2017-06-12 PROCEDURE — 27000221 HC OXYGEN, UP TO 24 HOURS

## 2017-06-12 PROCEDURE — 94760 N-INVAS EAR/PLS OXIMETRY 1: CPT

## 2017-06-12 PROCEDURE — 25000003 PHARM REV CODE 250: Performed by: NURSE ANESTHETIST, CERTIFIED REGISTERED

## 2017-06-12 PROCEDURE — 93286 PERI-PX EVAL PM/LDLS PM IP: CPT | Mod: 26,,, | Performed by: INTERNAL MEDICINE

## 2017-06-12 PROCEDURE — 93005 ELECTROCARDIOGRAM TRACING: CPT

## 2017-06-12 PROCEDURE — 63600175 PHARM REV CODE 636 W HCPCS: Performed by: NURSE ANESTHETIST, CERTIFIED REGISTERED

## 2017-06-12 PROCEDURE — 63600175 PHARM REV CODE 636 W HCPCS

## 2017-06-12 PROCEDURE — 27100006 EP LAB PROCEDURE

## 2017-06-12 PROCEDURE — 37000009 HC ANESTHESIA EA ADD 15 MINS: Performed by: INTERNAL MEDICINE

## 2017-06-12 PROCEDURE — D9220A PRA ANESTHESIA: Mod: CRNA,,, | Performed by: NURSE ANESTHETIST, CERTIFIED REGISTERED

## 2017-06-12 PROCEDURE — 37000008 HC ANESTHESIA 1ST 15 MINUTES: Performed by: INTERNAL MEDICINE

## 2017-06-12 PROCEDURE — 25000003 PHARM REV CODE 250

## 2017-06-12 PROCEDURE — 93650 ICAR CATH ABLTJ AV NODE FUNC: CPT

## 2017-06-12 PROCEDURE — 93010 ELECTROCARDIOGRAM REPORT: CPT | Mod: 77,S$GLB,, | Performed by: INTERNAL MEDICINE

## 2017-06-12 PROCEDURE — D9220A PRA ANESTHESIA: Mod: ANES,,, | Performed by: ANESTHESIOLOGY

## 2017-06-12 PROCEDURE — 93650 ICAR CATH ABLTJ AV NODE FUNC: CPT | Mod: ,,, | Performed by: INTERNAL MEDICINE

## 2017-06-12 PROCEDURE — 93010 ELECTROCARDIOGRAM REPORT: CPT | Mod: S$GLB,,, | Performed by: INTERNAL MEDICINE

## 2017-06-12 PROCEDURE — 63600175 PHARM REV CODE 636 W HCPCS: Performed by: NURSE PRACTITIONER

## 2017-06-12 RX ORDER — LIDOCAINE 50 MG/G
2 PATCH TOPICAL DAILY
Status: DISCONTINUED | OUTPATIENT
Start: 2017-06-13 | End: 2017-06-13 | Stop reason: HOSPADM

## 2017-06-12 RX ORDER — SODIUM CHLORIDE 9 MG/ML
INJECTION, SOLUTION INTRAVENOUS CONTINUOUS PRN
Status: DISCONTINUED | OUTPATIENT
Start: 2017-06-12 | End: 2017-06-12

## 2017-06-12 RX ORDER — WARFARIN SODIUM 5 MG/1
5 TABLET ORAL DAILY
Status: DISCONTINUED | OUTPATIENT
Start: 2017-06-12 | End: 2017-06-12

## 2017-06-12 RX ORDER — LEVETIRACETAM 500 MG/1
500 TABLET ORAL 2 TIMES DAILY
Status: DISCONTINUED | OUTPATIENT
Start: 2017-06-12 | End: 2017-06-13 | Stop reason: HOSPADM

## 2017-06-12 RX ORDER — ONDANSETRON 2 MG/ML
4 INJECTION INTRAMUSCULAR; INTRAVENOUS EVERY 8 HOURS PRN
Status: DISCONTINUED | OUTPATIENT
Start: 2017-06-12 | End: 2017-06-13 | Stop reason: HOSPADM

## 2017-06-12 RX ORDER — SODIUM CHLORIDE 9 MG/ML
INJECTION, SOLUTION INTRAVENOUS
Status: DISCONTINUED | OUTPATIENT
Start: 2017-06-13 | End: 2017-06-13 | Stop reason: HOSPADM

## 2017-06-12 RX ORDER — MIDODRINE HYDROCHLORIDE 5 MG/1
10 TABLET ORAL
Status: DISCONTINUED | OUTPATIENT
Start: 2017-06-12 | End: 2017-06-13 | Stop reason: HOSPADM

## 2017-06-12 RX ORDER — MIDAZOLAM HYDROCHLORIDE 1 MG/ML
INJECTION, SOLUTION INTRAMUSCULAR; INTRAVENOUS
Status: DISCONTINUED | OUTPATIENT
Start: 2017-06-12 | End: 2017-06-12

## 2017-06-12 RX ORDER — ACETAMINOPHEN 325 MG/1
650 TABLET ORAL EVERY 6 HOURS PRN
Status: DISCONTINUED | OUTPATIENT
Start: 2017-06-12 | End: 2017-06-13 | Stop reason: HOSPADM

## 2017-06-12 RX ORDER — FLUDROCORTISONE ACETATE 0.1 MG/1
100 TABLET ORAL 2 TIMES DAILY
Status: DISCONTINUED | OUTPATIENT
Start: 2017-06-12 | End: 2017-06-13 | Stop reason: HOSPADM

## 2017-06-12 RX ORDER — WARFARIN 7.5 MG/1
7.5 TABLET ORAL DAILY
Status: DISCONTINUED | OUTPATIENT
Start: 2017-06-12 | End: 2017-06-13 | Stop reason: HOSPADM

## 2017-06-12 RX ORDER — ATORVASTATIN CALCIUM 20 MG/1
80 TABLET, FILM COATED ORAL DAILY
Status: DISCONTINUED | OUTPATIENT
Start: 2017-06-13 | End: 2017-06-13 | Stop reason: HOSPADM

## 2017-06-12 RX ORDER — FENTANYL CITRATE 50 UG/ML
INJECTION, SOLUTION INTRAMUSCULAR; INTRAVENOUS
Status: DISCONTINUED | OUTPATIENT
Start: 2017-06-12 | End: 2017-06-12

## 2017-06-12 RX ORDER — BISACODYL 5 MG
5 TABLET, DELAYED RELEASE (ENTERIC COATED) ORAL DAILY PRN
Status: DISCONTINUED | OUTPATIENT
Start: 2017-06-12 | End: 2017-06-13 | Stop reason: HOSPADM

## 2017-06-12 RX ORDER — MIDODRINE HYDROCHLORIDE 5 MG/1
15 TABLET ORAL
Status: DISCONTINUED | OUTPATIENT
Start: 2017-06-13 | End: 2017-06-13 | Stop reason: HOSPADM

## 2017-06-12 RX ORDER — PREDNISONE 10 MG/1
10 TABLET ORAL DAILY
Status: DISCONTINUED | OUTPATIENT
Start: 2017-06-13 | End: 2017-06-13 | Stop reason: HOSPADM

## 2017-06-12 RX ORDER — CINACALCET 30 MG/1
30 TABLET, FILM COATED ORAL
Status: DISCONTINUED | OUTPATIENT
Start: 2017-06-13 | End: 2017-06-13 | Stop reason: HOSPADM

## 2017-06-12 RX ORDER — SEVELAMER CARBONATE 800 MG/1
800 TABLET, FILM COATED ORAL
Status: DISCONTINUED | OUTPATIENT
Start: 2017-06-12 | End: 2017-06-13 | Stop reason: HOSPADM

## 2017-06-12 RX ORDER — CEFAZOLIN SODIUM 2 G/50ML
2 SOLUTION INTRAVENOUS
Status: COMPLETED | OUTPATIENT
Start: 2017-06-12 | End: 2017-06-12

## 2017-06-12 RX ORDER — DIGOXIN 125 MCG
0.12 TABLET ORAL EVERY OTHER DAY
Status: DISCONTINUED | OUTPATIENT
Start: 2017-06-13 | End: 2017-06-13 | Stop reason: HOSPADM

## 2017-06-12 RX ORDER — GABAPENTIN 300 MG/1
300 CAPSULE ORAL 3 TIMES DAILY
Status: DISCONTINUED | OUTPATIENT
Start: 2017-06-12 | End: 2017-06-13 | Stop reason: HOSPADM

## 2017-06-12 RX ORDER — PANTOPRAZOLE SODIUM 40 MG/1
40 TABLET, DELAYED RELEASE ORAL DAILY
Status: DISCONTINUED | OUTPATIENT
Start: 2017-06-13 | End: 2017-06-13 | Stop reason: HOSPADM

## 2017-06-12 RX ORDER — TIZANIDINE 4 MG/1
4 TABLET ORAL EVERY 8 HOURS PRN
Status: DISCONTINUED | OUTPATIENT
Start: 2017-06-12 | End: 2017-06-13 | Stop reason: HOSPADM

## 2017-06-12 RX ADMIN — CEFAZOLIN SODIUM 2 G: 2 SOLUTION INTRAVENOUS at 03:06

## 2017-06-12 RX ADMIN — LEVETIRACETAM 500 MG: 500 TABLET, FILM COATED ORAL at 09:06

## 2017-06-12 RX ADMIN — MIDAZOLAM 1 MG: 1 INJECTION INTRAMUSCULAR; INTRAVENOUS at 03:06

## 2017-06-12 RX ADMIN — WARFARIN SODIUM 7.5 MG: 7.5 TABLET ORAL at 09:06

## 2017-06-12 RX ADMIN — MIDAZOLAM 0.5 MG: 1 INJECTION INTRAMUSCULAR; INTRAVENOUS at 04:06

## 2017-06-12 RX ADMIN — SEVELAMER CARBONATE 800 MG: 800 TABLET, FILM COATED ORAL at 09:06

## 2017-06-12 RX ADMIN — SODIUM CHLORIDE: 0.9 INJECTION, SOLUTION INTRAVENOUS at 03:06

## 2017-06-12 RX ADMIN — MIDODRINE HYDROCHLORIDE 10 MG: 5 TABLET ORAL at 09:06

## 2017-06-12 RX ADMIN — FENTANYL CITRATE 50 MCG: 50 INJECTION, SOLUTION INTRAMUSCULAR; INTRAVENOUS at 04:06

## 2017-06-12 RX ADMIN — GABAPENTIN 300 MG: 300 CAPSULE ORAL at 09:06

## 2017-06-12 RX ADMIN — FLUDROCORTISONE ACETATE 100 MCG: 0.1 TABLET ORAL at 09:06

## 2017-06-12 NOTE — ANESTHESIA RELEASE NOTE
"Anesthesia Release from PACU Note    Patient: Sisi Medel    Procedure(s) Performed: Procedure(s) (LRB):  ABLATION (N/A)    Anesthesia type: MAC    Post pain: Adequate analgesia    Post assessment: no apparent anesthetic complications    Last Vitals:   Visit Vitals  /74   Pulse 91   Temp 36.4 °C (97.5 °F) (Oral)   Resp 18   Ht 5' 8" (1.727 m)   Wt 122 kg (269 lb)   LMP  (LMP Unknown)   SpO2 100%   Breastfeeding? No   BMI 40.90 kg/m²       Post vital signs: stable    Level of consciousness: awake and alert     Nausea/Vomiting: no nausea/no vomiting    Complications: none    Airway Patency: patent    Respiratory: unassisted    Cardiovascular: stable and blood pressure at baseline    Hydration: euvolemic  "

## 2017-06-12 NOTE — PROGRESS NOTES
EP Procedure Note    Procedure: AV aye ablation  Access: R CFV x1    Access was obtained in the R CFV.   Catheters were advanced and positioned along the His bundle.   Ablation was performed on atrial portion of AV node.  A junctional escape at ~40bpm remained after ablation.   Junctional escape remained after 20 minute wait period.  CRT-P was programmed at VVIR with rate of 90bpm.  Catheter and sheath were removed.   Manual pressor was held.    No complications.    xxxxxxxxxxxx[id number]    Nephrology has been consulted. HD planned for tomorrow morning.  Will give warfarin 7.5mg this evening due to subtherapeutic INR.

## 2017-06-12 NOTE — ANESTHESIA POSTPROCEDURE EVALUATION
"Anesthesia Post Evaluation    Patient: Sisi Medel    Procedure(s) Performed: Procedure(s) (LRB):  ABLATION (N/A)    Final Anesthesia Type: MAC  Patient location during evaluation: PACU  Patient participation: Yes- Able to Participate  Level of consciousness: awake and alert  Post-procedure vital signs: reviewed and stable  Pain management: adequate  Airway patency: patent  PONV status at discharge: No PONV  Anesthetic complications: no      Cardiovascular status: blood pressure returned to baseline  Respiratory status: nasal cannula  Hydration status: euvolemic  Follow-up not needed.        Visit Vitals  /74   Pulse 91   Temp 36.4 °C (97.5 °F) (Oral)   Resp 18   Ht 5' 8" (1.727 m)   Wt 122 kg (269 lb)   LMP  (LMP Unknown)   SpO2 100%   Breastfeeding? No   BMI 40.90 kg/m²       Pain/Leslie Score: Pain Assessment Performed: Yes (6/12/2017  6:10 PM)  Presence of Pain: denies (6/12/2017  6:10 PM)  Leslie Score: 9 (6/12/2017  5:40 PM)      "

## 2017-06-12 NOTE — ANESTHESIA PREPROCEDURE EVALUATION
06/12/2017  Sisi Medel is a 56 y.o., female.    Active Ambulatory Problems     Diagnosis Date Noted    Permanent atrial fibrillation 03/19/2013    Pulmonary sarcoidosis 03/19/2013    Seizure disorder 03/19/2013    Chronic respiratory failure with hypoxia 04/22/2013    Morbid obesity with BMI of 40.0-44.9, adult 08/15/2013    Persistent atrial fibrillation 04/06/2015    Secondary pulmonary hypertension 04/07/2015    ESRD on hemodialysis 02/23/2016    Hyperlipidemia 03/07/2016    Anemia in ESRD (end-stage renal disease) 03/07/2016    Chronic combined systolic and diastolic heart failure 06/14/2013    Chronic gout due to renal impairment without tophus 06/21/2016    Long-term (current) use of anticoagulants 07/05/2016    Current chronic use of systemic steroids 11/18/2016    Immunosuppressed status 11/26/2016    Hypoalbuminemia 11/26/2016    GERD (gastroesophageal reflux disease) 01/20/2017    Hemodialysis-associated hypotension 01/21/2017    Secondary adrenal insufficiency 02/04/2017    Vitamin D insufficiency 02/06/2017    Weakness generalized 02/17/2017    CRLD (chronic restrictive lung disease) 03/06/2017    Polyneuropathy associated with underlying disease 03/29/2017    PHN (postherpetic neuralgia) 04/07/2017    Syncope 04/22/2017    Osteopenia determined by x-ray 04/28/2017    ESRD on dialysis 05/15/2017     Resolved Ambulatory Problems     Diagnosis Date Noted    Heart failure, acute on chronic, systolic and diastolic 03/19/2013    HTN (hypertension) 03/19/2013    Hyperlipidemia 03/19/2013    Chronic gout 03/19/2013    Personal history of gastric ulcer 03/19/2013    Hypomagnesemia 03/21/2013    Hypokalemia 03/21/2013    Other chronic pulmonary heart diseases 06/14/2013    Atrial fibrillation with rapid ventricular response 06/26/2013    Chronic rhinitis  10/02/2013    Unspecified sinusitis (chronic) 10/07/2013    Pancreatitis, acute 10/16/2013    Intermittent generalized abdominal pain 10/17/2013    Nausea and vomiting 10/17/2013    Extrahepatic obstructive biliary disease 10/17/2013    Elevated INR 10/17/2013    Acute renal failure (ARF) 10/17/2013    Peripheral neuropathy 11/13/2013    Pancreatitis 01/09/2014    Shortness of breath 05/16/2014    Personal history of fall 07/14/2014    Volume overload 07/23/2014    Pulmonary hypertension 07/24/2014    Fever 07/29/2014    Pulmonary edema 07/29/2014    Palliative care encounter 08/01/2014    Dyspnea 08/01/2014    CKD (chronic kidney disease) stage 3, GFR 30-59 ml/min 08/06/2014    Goals of care, counseling/discussion 08/06/2014    Altered mental status 03/25/2015    Shoulder pain 04/05/2015    Gout flare 04/06/2015    Idiopathic gout of right shoulder 04/07/2015    Leukocytosis 04/07/2015    Lumbar radicular pain 05/21/2015    DDD (degenerative disc disease), lumbar 06/17/2015    Lumbar stenosis 06/17/2015    Spondylarthrosis 06/17/2015    Leg swelling     ADHF (acute decompensated heart failure)     Essential hypertension     Cervical radiculopathy 07/20/2015    Acute systolic congestive heart failure 12/14/2015    Acute renal failure 12/14/2015    Acute-on-chronic respiratory failure 01/19/2016    Hypervolemia 01/19/2016    Hypokalemia 01/26/2016    Acute on chronic congestive heart failure 02/17/2016    YADIEL (acute kidney injury) 02/18/2016    Volume overload 02/18/2016    Shortness of breath 03/07/2016    Gout 03/07/2016    Acute on chronic congestive heart failure 03/09/2016    Lateral meniscal tear 05/31/2016    Pes anserine bursitis 05/31/2016    Complications due to renal dialysis device, implant, and graft     Clotted dialysis access 06/20/2016    Hyperkalemia 06/20/2016    Atrial fibrillation with RVR 06/25/2016    Syncope 06/27/2016    Closed fracture of  proximal end of right fibula 06/27/2016    Muscle spasm 09/01/2016    Constipation 09/01/2016    Subclinical hyperthyroidism 09/01/2016    Other complications due to renal dialysis device, implant, and graft     Arteriovenous fistula stenosis 10/12/2016    SOB (shortness of breath) 11/17/2016    Acute on chronic respiratory failure with hypoxia 11/17/2016    Sepsis 11/26/2016    Severe sepsis with acute organ dysfunction due to Gram positive bacteria 11/26/2016    Pneumonia due to Streptococcus pneumoniae 11/26/2016    Dialysis AV fistula malfunction     Impaired glucose tolerance 02/06/2017     Past Medical History:   Diagnosis Date    Anemia in ESRD (end-stage renal disease) 3/7/2016    Anticoagulant long-term use     Cervical radiculopathy 7/20/2015    CHF (congestive heart failure)     Chronic combined systolic and diastolic heart failure 6/14/2013    Chronic respiratory failure with hypoxia 04/22/2013    Closed fracture of proximal end of right fibula 6/27/2016    Complications due to renal dialysis device, implant, and graft     DDD (degenerative disc disease), lumbar 6/17/2015    ESRD on hemodialysis 2/23/2016    Essential hypertension     Gout     Lateral meniscal tear 5/31/2016    Lumbar stenosis 6/17/2015    Paroxysmal atrial fibrillation 5/16/2014    Peripheral neuropathy 11/13/2013    Personal history of gastric ulcer 3/19/2013    Sarcoidosis, lung 2009    Secondary pulmonary hypertension 4/7/2015    Seizure disorder 3/19/2013    Seizures     Shingles 11/13/2013    Thyroid disease      PFTs 3/2017  Pulmonary function studies done today show the forced vital capacity is 2.01 L,   which is 57% of predicted.  The FEV1 is 1.69 L, or 60% of predicted.  The ratio   of these values is 84%.  The diffusion capacity is 8.6.  This result will be   adjusted upward modestly due to her chronic anemia.  When the results of today's   studies are compared to the most recent previous  study from January, there does   not appear to be any significant change.  The spirometry values are modestly   decreased in comparison to prior studies from 2015 and 2014.  Despite this, the   diffusion capacity, while reduced from normal, remains stable.    6/2016  ECHO  CONCLUSIONS     1 - Left atrial appendage is single lobed with no visualized thrombus.     2 - Normal left ventricular systolic function (EF 60-65%).     3 - Mild tricuspid regurgitation.       Normal RV function     Anesthesia Evaluation    I have reviewed the Patient Summary Reports.    I have reviewed the Nursing Notes.      Review of Systems  Anesthesia Hx:  Denies Hx of Anesthetic complications    Social:  Former Smoker    Hematology/Oncology:        Hematology Comments: On warfarin   Cardiovascular:   Exercise tolerance: poor Pacemaker (Biventricular PM) Hypertension, well controlled Denies CAD.   Dysrhythmias atrial fibrillation CHF HYATT Secondary pulmonary hypertension    [facility] 2014 PAPRES: 60/24 (37)   Pulmonary:   Denies COPD.  Denies Asthma. Shortness of breath  Denies Sleep Apnea. Pulmonary Sarcoid   Home O2 2/4 l/min   Renal/:   Chronic Renal Disease (HD T/TH/S, last HD Sat), ESRD, Dialysis    Hepatic/GI:   GERD, well controlled Denies Liver Disease.    Musculoskeletal:   Arthritis     Neurological:   Denies CVA. Seizures (On Keppra, last seizure 2 years ago), well controlled   Peripheral Neuropathy    Endocrine:   Denies Diabetes. Denies Hypothyroidism.        Physical Exam  General:  Morbid Obesity    Airway/Jaw/Neck:  Airway Findings: Mallampati: II TM Distance: Normal, at least 6 cm  Jaw/Neck Findings:  Neck ROM: Normal ROM       Chest/Lungs:  Chest/Lungs Findings: Normal Respiratory Rate     Heart/Vascular:  Heart Findings: Rate: Normal        Mental Status:  Mental Status Findings:  Cooperative, Alert and Oriented         Anesthesia Plan  Type of Anesthesia, risks & benefits discussed:  Anesthesia Type:  general  Patient's  Preference: GA vs MAC  Intra-op Monitoring Plan: standard ASA monitors  Intra-op Monitoring Plan Comments:   Post Op Pain Control Plan: multimodal analgesia, IV/PO Opiods PRN and per primary service following discharge from PACU  Post Op Pain Control Plan Comments:   Induction:   IV  Beta Blocker:  Patient is not currently on a Beta-Blocker (No further documentation required).       Informed Consent: Patient understands risks and agrees with Anesthesia plan.  Questions answered. Anesthesia consent signed with patient.  ASA Score: 4     Day of Surgery Review of History & Physical:    H&P update referred to the surgeon.     Anesthesia Plan Notes: The patient's PMH was reviewed and PE was performed  Plan for GA - TIVA vs MAC titrated to comfort.  Discussed possibility of awareness under sedation         Ready For Surgery From Anesthesia Perspective.

## 2017-06-12 NOTE — H&P
[facility]  Cardiology Electrophysiology  History and Physical     Patient Name: Sisi Medel  MRN: [id number]  Admission Date: 6/12/2017  Attending Provider: Bladimir Adorno MD  Principal Problem: Atrial fibrillation    Patient information was obtained from patient     Subjective:     Chief Complaint:  AF     HPI:  56 year old with PMH ESRD on HD ( Tuesday/Thursday/Saturday) , HTN, pHTN on 02, sarcoid, AF underwent DCCV > ILR implant revealed PAF, symptomatic, planned  for CRT - P and AVN RFA. CRT-P placed 1/11/17 with ILR removal .  Pt remains with  NYHA III-IV type symptoms. Pt now presented for AVN RFA. Pt denies any chest pains, overt shortness of breath, fevers chills . Last HD was on Saturday     Past Medical History:   Diagnosis Date    Anemia in ESRD (end-stage renal disease) 3/7/2016    Anticoagulant long-term use     Cervical radiculopathy 7/20/2015    CHF (congestive heart failure)     Chronic combined systolic and diastolic heart failure 6/14/2013    EF 20% 2013, improved with Medical therapy, negative PET 2013    Chronic respiratory failure with hypoxia 04/22/2013    On home oxygen at 2-5 liters    Closed fracture of proximal end of right fibula 6/27/2016    Complications due to renal dialysis device, implant, and graft     DDD (degenerative disc disease), lumbar 6/17/2015    ESRD on hemodialysis 2/23/2016    Essential hypertension     Gout     Lateral meniscal tear 5/31/2016    Lumbar stenosis 6/17/2015    Paroxysmal atrial fibrillation 5/16/2014    Not on anticoagulation    Peripheral neuropathy 11/13/2013    Personal history of gastric ulcer 3/19/2013    Sarcoidosis, lung 2009    Diagnosed in 2009 with ocular manifestation, on 4L home O2 and PO steroids    Secondary pulmonary hypertension 4/7/2015    Seizure disorder 3/19/2013    Seizures     Shingles 11/13/2013    Thyroid disease        Past Surgical History:   Procedure Laterality Date     ABDOMINAL SURGERY      CARDIAC PACEMAKER PLACEMENT       SECTION      x2    OTHER SURGICAL HISTORY      loop recorder implant    stent to fistula      VASCULAR SURGERY      fistula to L upper arm           Review of patient's allergies indicates:   Allergen Reactions    Bactrim [sulfamethoxazole-trimethoprim] Other (See Comments)     Causes renal failure    Nsaids (non-steroidal anti-inflammatory drug) Other (See Comments)     Renal failure        No current facility-administered medications on file prior to encounter.      Current Outpatient Prescriptions on File Prior to Encounter   Medication Sig Dispense Refill    ACZONE 5 % topical gel Apply to face every morning  2    atorvastatin (LIPITOR) 80 MG tablet TAKE 1 TABLET(80 MG) BY MOUTH EVERY DAY (Patient taking differently: TAKE 1 TABLET(80 MG) BY MOUTH EVERY NIGHT) 90 tablet 3    cholecalciferol, vitamin D3, 1,000 unit capsule Take 1 capsule (1,000 Units total) by mouth once daily. (Patient taking differently: Take 1,000 Units by mouth every evening. )  0    digoxin (LANOXIN) 125 mcg tablet Take 1 tablet (0.125 mg total) by mouth every other day. 15 tablet 1    econazole nitrate 1 % cream Apply topically 2 (two) times daily. As needed for fungal skin infection - use for 2-4 weeks 30 g 1    fludrocortisone (FLORINEF) 0.1 mg Tab Take 100 mcg by mouth 2 (two) times daily.      gabapentin (NEURONTIN) 300 MG capsule TAKE ONE CAPSULE BY MOUTH THREE TIMES DAILY 270 capsule 3    ketorolac 0.5% (ACULAR) 0.5 % Drop instill one drop into both eyes every morning  3    levetiracetam (KEPPRA) 500 MG Tab Take 1 tablet (500 mg total) by mouth 2 (two) times daily. 60 tablet 5    midodrine (PROAMATINE) 5 MG Tab Take 2 tablets (10 mg total) by mouth every Mon, Wed, Fri, Sun. 32 tablet 11    polyethylene glycol (GLYCOLAX) 17 gram/dose powder Take 17 g by mouth once daily. Dissolve in juice. 510 g 0    prednisoLONE acetate (PRED FORTE) 1 % DrpS INSTILL ONE  DROP INTO  LEFT EYE THREE TIMES A DAY  3    predniSONE (DELTASONE) 10 MG tablet TAKE 1 TABLET BY MOUTH EVERY DAY WITH BREAKFAST 90 tablet 0    RENVELA 800 mg Tab TAKE 1 TABLET BY MOUTH THREE TIMES DAILY WITH MEALS 90 tablet 0    SENSIPAR 30 mg Tab       warfarin (COUMADIN) 5 MG tablet Take 1 tablet (5 mg total) by mouth Daily. Per [facility] (Patient taking differently: Take 5 mg by mouth every evening. Per [facility]) 90 tablet 3    ammonium lactate (LAC-HYDRIN) 12 % lotion Apply topically as needed (to legs).   2    diclofenac sodium 1 % Gel Apply 2 g topically every 8 (eight) hours as needed. 100 g 0    inulin-sorbitol 2 gram Chew Take 2 tablets by mouth 2 (two) times daily.  0    lidocaine (LIDODERM) 5 % Place 2 patches onto the skin once daily. One on each foot. Remove & Discard patch within 12 hours or as directed by MD Short patch 2    midodrine (PROAMATINE) 5 MG Tab Take 3 tablets (15 mg total) by mouth every Tues, Thurs, Sat. Take 2 tablets (10 mg total) by mouth on non dialysis days.      omeprazole (PRILOSEC) 20 MG capsule Take 1 capsule (20 mg total) by mouth once daily. 30 capsule 5    tizanidine 4 mg Cap Take 1 capsule by mouth daily as needed. 60 capsule 0       Family History   Problem Relation Age of Onset    Kidney failure Mother     Hypertension Mother     Diabetes Mother     Coronary artery disease Father     Hypertension Father     Diabetes Father     Lupus Sister     Diabetes Maternal Grandmother     Colon cancer Neg Hx     Ovarian cancer Neg Hx     Breast cancer Neg Hx     Melanoma Neg Hx        Social History   Substance Use Topics    Smoking status: Former Smoker     Packs/day: 0.50     Years: 10.00     Types: Cigarettes     Quit date: 4/22/1994    Smokeless tobacco: Never Used    Alcohol use No      Comment: rarely         Review of Systems   Constitution: positive for weakness and malaise/fatigue.   Eyes: Negative for blurred vision.   Cardiovascular:  Negative for chest pain  Negative for near-syncope, palpitations and syncope.  Respiratory:  Positive  for shortness of breath on exertion  Endocrine:  Denies any acute symptoms   Hematologic/Lymphatic: positive  for bruise/bleed easily.   Skin:  Negative for rash.   Musculoskeletal: Negative for joint pain and muscle weakness.   Gastrointestinal:  Negative for abdominal pain and change in bowel habit.   Genitourinary: pt on HD   Neurological:  Positive for dizziness.   Psychiatric/Behavioral:  Negative for depression, anxiety         Objective:     Temp: 97.6 °F (36.4 °C) (06/12/17 0950)  Pulse: 89 (06/12/17 0950)  Resp: 16 (06/12/17 0950)  BP: 119/81 (06/12/17 0950)  SpO2: 100 % (06/12/17 0950)    Vital Signs (24h Range):  Temp:  [97.6 °F (36.4 °C)]   Pulse:  [89]   Resp:  [16]   BP: (119)/(81)   SpO2:  [100 %]       PE:   General: Well developed, well nourished. No distress on on NC    HEENT: Head is normocephalic, atraumatic;  Neck: Supple, symmetrical neck;   Lungs: Clear to auscultation bilaterally.  No wheezing. Normal respiratory effort.  Cardiovascular:  Irregularly irregular rhythm and normal heart sounds.    PMI is not displaced  Extremities: non pitting edema   Abdomen: Abdomen is soft, non-tender non-distended with normal bowel sounds.  Skin: no rashes   Musculoskeletal: normal range of motion   Neurologic: Normal strength and tone. No focal numbness or weakness.   Psychiatric: normal mood and affect.  behavior is normal. Alert and oriented X 3.      Significant Labs:   Lab Results   Component Value Date    WBC 10.21 06/08/2017    HGB 14.2 06/08/2017    HCT 45.5 06/08/2017    MCV 95 06/08/2017     06/08/2017     BMP  Lab Results   Component Value Date     06/08/2017    K 3.6 06/08/2017    CL 94 (L) 06/08/2017    CO2 32 (H) 06/08/2017    BUN 16 06/08/2017    CREATININE 4.2 (H) 06/08/2017    CALCIUM 9.7 06/08/2017    ANIONGAP 15 06/08/2017    ESTGFRAFRICA 13 (A) 06/08/2017    EGFRNONAA 11 (A)  2017      Lab Results   Component Value Date    INR 3.2 (H) 2017    INR 2.4 2017    INR 1.7 2017       Significant Imaging:     EP lab procedure 17     ECHO   CONCLUSIONS     1 - Upper limit of normal left ventricular enlargement.     2 - Low normal to mildly depressed left ventricular systolic function (EF 50-55%).     3 - Eccentric hypertrophy.     4 - Left ventricular diastolic dysfunction.     5 - Biatrial enlargement.     6 - Normal right ventricular systolic function .     7 - Trivial pulmonic regurgitation.     8 - Intermediate central venous pressure.     This document has been electronically    SIGNED BY: Gene Cruz MD On: 2016 13:16    EK2017 BiV pacemaker 76 BPM     Assessment and Plan:   56 year old with PMH ESRD on HD ( Tuesday/Thursday/Saturday) , HTN, pHTN on , sarcoid, AF underwent DCCV > ILR implant revealed PAF, symptomatic, planned for  for CRT - P and AVN RFA. CRT-P placed 17 with ILR removal .  Pt remains with  NYHA III-IV type symptoms. RFA AVN, which hopefully will have some salutary effect on her symptom burden.    - AVN RFA  - Sedation per anesthesia.  - Pt on chronic HD //Saturday- nephrology consult for HD  management     Consent signed in clinic   Prior to procedure, extensive discussion with patient regarding risks and benefits of  ablation, and patient  would like to proceed.  Risks of procedure include but are not limited to the risk of infection, bleeding, stroke, paralysis,  MI, death, embolism, perforation requiring emergency draining or surgery.   The patient voices understanding and all questions have been answered. No further questions/concerns voiced at this time.      Signed:  LILY Fiore-BC  Cardiology Electrophysiology  NP [facility]    Attending: MD Kyree Garrison

## 2017-06-12 NOTE — TRANSFER OF CARE
"Anesthesia Transfer of Care Note    Patient: Sisi Medel    Procedure(s) Performed: Procedure(s) (LRB):  ABLATION (N/A)    Patient location: PACU    Anesthesia Type: MAC    Transport from OR: Transported from OR on 2-3 L/min O2 by NC with adequate spontaneous ventilation    Post pain: adequate analgesia    Post assessment: no apparent anesthetic complications and tolerated procedure well    Post vital signs: stable    Level of consciousness: awake, alert and oriented    Nausea/Vomiting: no nausea/vomiting    Complications: none    Transfer of care protocol was followed      Last vitals:   Visit Vitals  /58   Pulse 90   Temp 36.4 °C (97.6 °F) (Oral)   Resp 15   Ht 5' 8" (1.727 m)   Wt 122 kg (269 lb)   LMP  (LMP Unknown)   SpO2 100% on 4 L NC   Breastfeeding? No   BMI 40.90 kg/m²     "

## 2017-06-12 NOTE — PROGRESS NOTES
Chart reviewed. ESRD TTS. Last HD on Saturday.  Will plan for HD in AM x 4 hours. Orders placed.  Full consult note to follow.       Isaura Rascon, PGY-4  Nephrology Fellow  [facility]  Pager: [phone]

## 2017-06-12 NOTE — PROGRESS NOTES
Pt is AAOx4 and denies pain.  VSS.  Son at bedside.  Admit questions completed.  See full assessment for details.  Will continue to monitor.

## 2017-06-13 ENCOUNTER — ANTI-COAG VISIT (OUTPATIENT)
Dept: CARDIOLOGY | Facility: CLINIC | Age: 57
End: 2017-06-13

## 2017-06-13 ENCOUNTER — TELEPHONE (OUTPATIENT)
Dept: DERMATOLOGY | Facility: CLINIC | Age: 57
End: 2017-06-13

## 2017-06-13 VITALS
DIASTOLIC BLOOD PRESSURE: 78 MMHG | SYSTOLIC BLOOD PRESSURE: 121 MMHG | HEART RATE: 75 BPM | HEIGHT: 68 IN | BODY MASS INDEX: 40.77 KG/M2 | TEMPERATURE: 97 F | OXYGEN SATURATION: 98 % | WEIGHT: 269 LBS | RESPIRATION RATE: 18 BRPM

## 2017-06-13 DIAGNOSIS — Z79.01 LONG-TERM (CURRENT) USE OF ANTICOAGULANTS: ICD-10-CM

## 2017-06-13 DIAGNOSIS — I48.19 PERSISTENT ATRIAL FIBRILLATION: ICD-10-CM

## 2017-06-13 LAB
ALBUMIN SERPL BCP-MCNC: 2.9 G/DL
ALBUMIN SERPL BCP-MCNC: 2.9 G/DL
ANION GAP SERPL CALC-SCNC: 11 MMOL/L
BASOPHILS # BLD AUTO: 0.01 K/UL
BASOPHILS NFR BLD: 0.1 %
BUN SERPL-MCNC: 56 MG/DL
CALCIUM SERPL-MCNC: 8.8 MG/DL
CHLORIDE SERPL-SCNC: 100 MMOL/L
CO2 SERPL-SCNC: 23 MMOL/L
CREAT SERPL-MCNC: 8.8 MG/DL
DIFFERENTIAL METHOD: ABNORMAL
EOSINOPHIL # BLD AUTO: 0 K/UL
EOSINOPHIL NFR BLD: 0.5 %
ERYTHROCYTE [DISTWIDTH] IN BLOOD BY AUTOMATED COUNT: 15.3 %
EST. GFR  (AFRICAN AMERICAN): 5.3 ML/MIN/1.73 M^2
EST. GFR  (NON AFRICAN AMERICAN): 4.6 ML/MIN/1.73 M^2
GLUCOSE SERPL-MCNC: 151 MG/DL
HCT VFR BLD AUTO: 38.4 %
HGB BLD-MCNC: 12 G/DL
LYMPHOCYTES # BLD AUTO: 1.3 K/UL
LYMPHOCYTES NFR BLD: 15.5 %
MCH RBC QN AUTO: 29.9 PG
MCHC RBC AUTO-ENTMCNC: 31.3 %
MCV RBC AUTO: 96 FL
MONOCYTES # BLD AUTO: 0.6 K/UL
MONOCYTES NFR BLD: 7.1 %
NEUTROPHILS # BLD AUTO: 6.6 K/UL
NEUTROPHILS NFR BLD: 76.6 %
PHOSPHATE SERPL-MCNC: 2.3 MG/DL
PLATELET # BLD AUTO: 206 K/UL
PMV BLD AUTO: 10.5 FL
POTASSIUM SERPL-SCNC: 5.1 MMOL/L
RBC # BLD AUTO: 4.01 M/UL
SODIUM SERPL-SCNC: 134 MMOL/L
WBC # BLD AUTO: 8.62 K/UL

## 2017-06-13 PROCEDURE — 25000003 PHARM REV CODE 250: Performed by: INTERNAL MEDICINE

## 2017-06-13 PROCEDURE — 80100016 HC MAINTENANCE HEMODIALYSIS

## 2017-06-13 PROCEDURE — G0257 UNSCHED DIALYSIS ESRD PT HOS: HCPCS

## 2017-06-13 PROCEDURE — 85025 COMPLETE CBC W/AUTO DIFF WBC: CPT

## 2017-06-13 PROCEDURE — 90935 HEMODIALYSIS ONE EVALUATION: CPT | Mod: ,,, | Performed by: INTERNAL MEDICINE

## 2017-06-13 PROCEDURE — 63600175 PHARM REV CODE 636 W HCPCS: Performed by: INTERNAL MEDICINE

## 2017-06-13 PROCEDURE — 80069 RENAL FUNCTION PANEL: CPT

## 2017-06-13 RX ADMIN — GABAPENTIN 300 MG: 300 CAPSULE ORAL at 05:06

## 2017-06-13 RX ADMIN — MIDODRINE HYDROCHLORIDE 15 MG: 5 TABLET ORAL at 08:06

## 2017-06-13 RX ADMIN — SODIUM CHLORIDE: 0.9 INJECTION, SOLUTION INTRAVENOUS at 08:06

## 2017-06-13 RX ADMIN — LEVETIRACETAM 500 MG: 500 TABLET, FILM COATED ORAL at 02:06

## 2017-06-13 RX ADMIN — SEVELAMER CARBONATE 800 MG: 800 TABLET, FILM COATED ORAL at 02:06

## 2017-06-13 RX ADMIN — PANTOPRAZOLE SODIUM 40 MG: 40 TABLET, DELAYED RELEASE ORAL at 02:06

## 2017-06-13 RX ADMIN — SEVELAMER CARBONATE 800 MG: 800 TABLET, FILM COATED ORAL at 07:06

## 2017-06-13 RX ADMIN — PREDNISONE 10 MG: 10 TABLET ORAL at 02:06

## 2017-06-13 RX ADMIN — LIDOCAINE 2 PATCH: 50 PATCH TOPICAL at 02:06

## 2017-06-13 RX ADMIN — DIGOXIN 0.12 MG: 125 TABLET ORAL at 02:06

## 2017-06-13 RX ADMIN — FLUDROCORTISONE ACETATE 100 MCG: 0.1 TABLET ORAL at 02:06

## 2017-06-13 RX ADMIN — CINACALCET HYDROCHLORIDE 30 MG: 30 TABLET, COATED ORAL at 02:06

## 2017-06-13 RX ADMIN — GABAPENTIN 300 MG: 300 CAPSULE ORAL at 02:06

## 2017-06-13 RX ADMIN — ATORVASTATIN CALCIUM 80 MG: 20 TABLET, FILM COATED ORAL at 02:06

## 2017-06-13 NOTE — PROGRESS NOTES
Maintenance HD treatment complete. Duration of treatment 4 hours and 5 L removed. Treatment was tolerated well and no complications with access to left upper arm. Needles removed and hemostasis achieved. Dressing intact and no drainage noted. Thrill and bruit present.

## 2017-06-13 NOTE — HPI
Ms. Medel is a 57 yo AAF with ESRD, HTN, pHTN on O2, sarcoidosis, and afib admitted for AVN RFA. Procedure took place yesterday. Patient reports feeling much better. She is hopeful that she can soon regain her strength and start to exercise again. She is currently 10kg above her dry weight. She attributes this to non-compliance with fluid restriction. She has also been drinking margaritas (without salt). She plans to start working on her diet again soon. She currently denies any SOB. Nephrology consulted for HD prior to discharge later today.   Ms. Medel receives iHD TTS via LUE AVF at [facility]. Nephrologist: Dr. Aaron Tesfaye. Treatment duration 4.5 hours. EDW 115kg. No longer makes urine.

## 2017-06-13 NOTE — DISCHARGE SUMMARY
Cardiology Discharge Summary      Admit Date: 6/12/2017    Discharge Date:  6/13/2017    Attending Physician: Bladimir Adorno MD    Discharge Physician: Dr. Rusty Arriola    Principal Diagnoses:   Atrial fibrillation    Indication for Admission:   AV aye ablation    Discharged Condition:   Good    Hospital Course:   Ms Medel presented for AV aye ablation due to permanent symptomatic atrial fibrillation. Access was obtained in the R CFV. Catheters were advanced and positioned along the His bundle. Ablation was performed on atrial portion of AV node.A junctional escape at ~40bpm remained after ablation. Junctional escape remained after 20 minute wait period. CRT-P was programmed at VVIR with rate of 90bpm. Catheter and sheath were removed. Manual pressor was held without complication. She was dialyzed prior to discharge. Due to subtherapeutic INR of 1.6 on admit, she received warfarin 7.5mg x1 on 6/12 pm. Additionally, dermatology was consulted for purpura. They recommended outpatient follow up and ammonium lactate lotion.    Outpatient Plan:  Follow up with Dr Adorno in 6 weeks    Notable Studies:  AV aye ablation 6/12/17    Disposition:   Home or Self Care    Diet:  Cardiac    Activity:  No heavy lifting (>10lb) for one week  No baths, shower only for one week  No driving for at least 72 hours    Discharge Medications:      Medication List      CHANGE how you take these medications    atorvastatin 80 MG tablet  Commonly known as:  LIPITOR  TAKE 1 TABLET(80 MG) BY MOUTH EVERY DAY  What changed:  See the new instructions.     cholecalciferol (vitamin D3) 1,000 unit capsule  Take 1 capsule (1,000 Units total) by mouth once daily.  What changed:  when to take this     warfarin 5 MG tablet  Commonly known as:  COUMADIN  Take 1 tablet (5 mg total) by mouth Daily. Per [facility]  What changed:   when to take this   additional instructions        CONTINUE taking these medications    ACZONE 5 % topical  gel  Generic drug:  dapsone     ammonium lactate 12 % lotion  Commonly known as:  LAC-HYDRIN     diclofenac sodium 1 % Gel  Apply 2 g topically every 8 (eight) hours as needed.     digoxin 125 mcg tablet  Commonly known as:  LANOXIN  Take 1 tablet (0.125 mg total) by mouth every other day.     econazole nitrate 1 % cream  Apply topically 2 (two) times daily. As needed for fungal skin infection - use for 2-4 weeks     fludrocortisone 0.1 mg Tab  Commonly known as:  FLORINEF     gabapentin 300 MG capsule  Commonly known as:  NEURONTIN  TAKE ONE CAPSULE BY MOUTH THREE TIMES DAILY     inulin-sorbitol 2 gram Chew  Take 2 tablets by mouth 2 (two) times daily.     ketorolac 0.5% 0.5 % Drop  Commonly known as:  ACULAR     levetiracetam 500 MG Tab  Commonly known as:  KEPPRA  Take 1 tablet (500 mg total) by mouth 2 (two) times daily.     lidocaine 5 %  Commonly known as:  LIDODERM  Place 2 patches onto the skin once daily. One on each foot. Remove & Discard patch within 12 hours or as directed by MD     * midodrine 5 MG Tab  Commonly known as:  PROAMATINE  Take 2 tablets (10 mg total) by mouth every Mon, Wed, Fri, Sun.     * midodrine 5 MG Tab  Commonly known as:  PROAMATINE  Take 3 tablets (15 mg total) by mouth every Tues, Thurs, Sat. Take 2 tablets (10 mg total) by mouth on non dialysis days.     omeprazole 20 MG capsule  Commonly known as:  PRILOSEC  Take 1 capsule (20 mg total) by mouth once daily.     polyethylene glycol 17 gram/dose powder  Commonly known as:  GLYCOLAX  Take 17 g by mouth once daily. Dissolve in juice.     prednisoLONE acetate 1 % Drps  Commonly known as:  PRED FORTE     predniSONE 10 MG tablet  Commonly known as:  DELTASONE  TAKE 1 TABLET BY MOUTH EVERY DAY WITH BREAKFAST     RENVELA 800 mg Tab  Generic drug:  sevelamer carbonate  TAKE 1 TABLET BY MOUTH THREE TIMES DAILY WITH MEALS     SENSIPAR 30 MG Tab  Generic drug:  cinacalcet     tizanidine 4 mg Cap  Take 1 capsule by mouth daily as needed.         * This list has 2 medication(s) that are the same as other medications prescribed for you. Read the directions carefully, and ask your doctor or other care provider to review them with you.

## 2017-06-13 NOTE — PLAN OF CARE
Problem: Patient Care Overview  Goal: Plan of Care Review  Outcome: Outcome(s) achieved Date Met: 06/13/17  Pt free of falls/traumas/injuries. Denies c/o SOB, CP, or discomfort. Skin remains CDI; trace generalized edema noted. 5L fluid removed over 4hrs during HD today. Pt to be discharged today per MD orders. Dermatology consulted for outpatient. Pt tolerating plan of care.

## 2017-06-13 NOTE — PROGRESS NOTES
Maintenance HD treatment started with some difficulty to left upper access. Lines secured and telemetry in place. No complaints of discomfort at this time.

## 2017-06-13 NOTE — CONSULTS
Dermatology Inpatient Consult Note:  6/13/2017  9:26 AM    Reason for consult: hyperpigmented spots    HPI: 56 y.o. yo female presenting with dark spots on the arms, legs, and few on the chest for years. For some of the spots she recalls preceding trauma and for some she does not. She denies pain or symptoms associated. She has been on Coumadin and ASA and prednisone for years, has ESRD on hemodialysis, and sarcoidosis on home O2. She is very troubled by the appearance of the lesions. She saw Dr. Eller in 12/2016 and was reassured that these lesions were not dangerous; she was advised to use DerMend lotion and Excipial for bruising but she does not recall using these. She believes she has used am-lactin lotion in the past with some improvement.     Scheduled Meds:   atorvastatin  80 mg Oral Daily    cinacalcet  30 mg Oral Daily with breakfast    digoxin  0.125 mg Oral Every other day    fludrocortisone  100 mcg Oral BID    gabapentin  300 mg Oral TID    levetiracetam  500 mg Oral BID    lidocaine  2 patch Transdermal Daily    midodrine  10 mg Oral Every Mon, Wed, Fri, Sun    midodrine  15 mg Oral Every Tues, Thurs, Sat    pantoprazole  40 mg Oral Daily    predniSONE  10 mg Oral Daily    sevelamer carbonate  800 mg Oral TID WM    warfarin  7.5 mg Oral Daily     Continuous Infusions:   PRN Meds:.sodium chloride 0.9%, acetaminophen, bisacodyl, ondansetron, tizanidine    Review of patient's allergies indicates:   Allergen Reactions    Bactrim [sulfamethoxazole-trimethoprim] Other (See Comments)     Causes renal failure    Nsaids (non-steroidal anti-inflammatory drug) Other (See Comments)     Renal failure       Past Medical History:   Diagnosis Date    Anemia in ESRD (end-stage renal disease) 3/7/2016    Anticoagulant long-term use     Cervical radiculopathy 7/20/2015    CHF (congestive heart failure)     Chronic combined systolic and diastolic heart failure 6/14/2013    EF 20% 2013, improved with  Medical therapy, negative PET     Chronic respiratory failure with hypoxia 2013    On home oxygen at 2-5 liters    Closed fracture of proximal end of right fibula 2016    Complications due to renal dialysis device, implant, and graft     DDD (degenerative disc disease), lumbar 2015    ESRD on hemodialysis 2016    Essential hypertension     Gout     Lateral meniscal tear 2016    Lumbar stenosis 2015    Paroxysmal atrial fibrillation 2014    Not on anticoagulation    Peripheral neuropathy 2013    Personal history of gastric ulcer 3/19/2013    Sarcoidosis, lung 2009    Diagnosed in  with ocular manifestation, on 4L home O2 and PO steroids    Secondary pulmonary hypertension 2015    Seizure disorder 3/19/2013    Seizures     Shingles 2013    Thyroid disease        Past Surgical History:   Procedure Laterality Date    ABDOMINAL SURGERY      CARDIAC PACEMAKER PLACEMENT       SECTION      x2    OTHER SURGICAL HISTORY      loop recorder implant    stent to fistula      VASCULAR SURGERY      fistula to L upper arm        Social History     Social History    Marital status: Single     Spouse name: N/A    Number of children: N/A    Years of education: N/A     Occupational History    Not on file.     Social History Main Topics    Smoking status: Former Smoker     Packs/day: 0.50     Years: 10.00     Types: Cigarettes     Quit date: 1994    Smokeless tobacco: Never Used    Alcohol use No      Comment: rarely    Drug use: No    Sexual activity: Not on file     Other Topics Concern    Not on file     Social History Narrative    Lives with boyfriend/partner who helps with her care.     Plans to travel to [place] for 2 weeks in 2016       Family History   Problem Relation Age of Onset    Kidney failure Mother     Hypertension Mother     Diabetes Mother     Coronary artery disease Father     Hypertension Father     Diabetes  Father     Lupus Sister     Diabetes Maternal Grandmother     Colon cancer Neg Hx     Ovarian cancer Neg Hx     Breast cancer Neg Hx     Melanoma Neg Hx        Review of Systems:  Constitutional:  no fevers, chills, fatigue, or malaise.  HEENT: no headaches, dysphagia, or mouth sores.  Ocular: no eye irritation or blurry vision.  Heme: Positive for easy bruising and bleeding.  Musculoskeletal: no arthralgias, joint swelling, or myalgias.  GI: no nausea, vomiting, or diarrhea.  : no genital sores, dysuria, or genital itching.  Neuro: no numbness or tingling.  CV: no chest pain. Positive for shortness of breath.  Skin: no pruritus, burning, pain, blisters or history of keloidal scarring    Physical Exam:  Vitals:    06/13/17 0730   BP: 99/74   Pulse: 90   Resp: 12   Temp: 97.3 °F (36.3 °C)     General: NAD, WDWN, on O2 NC  Psych: AAOx3.  HEENT: no mouth sores or nasal lesions.  Ocular: no ocular lesions or conjunctivitis  Skin:  - Face: no rash  - Neck: no rash  - Chest: purpuric patch  - Back: no rash  - Abdomen: no rash  - RUE: large purpuric patches  - LUE: large purpuric patches  - RLE: large purpuric patches dorsal forearm in multiple stages of evolution (some hyperpigmented, some more violaceous)  - LLE: purpuric patches dorsal forearm  - Hands:  no rash  - Feet:  no rash    Assessment and Plan:  57yo woman admitted after AV aye ablation, with years of purpuric patches scattered mainly on arms and legs, few on chest. She is on coumadin, prednisone, and ASA, and has ESRD.     # Purpura:   - Counseled patient that age, sun exposure, and prednisone all increase skin fragility due to decreased collagen; coumadin, ASA, and ESRD all make her bleed more easily with minimal trauma.   - Will schedule outpatient follow up with Dr. Eller in the Dermatology clinic and inform patient of appt time/date  - In the meantime, recommend ammonium lactate lotion and patient can try Dermablend or similar makeup to cover  lesions.     Discussed with Dr. Lofton. Please call with any questions/concerns.     Kelly Diaz MD  Dermatology PGY2

## 2017-06-13 NOTE — SUBJECTIVE & OBJECTIVE
Past Medical History:   Diagnosis Date    Anemia in ESRD (end-stage renal disease) 3/7/2016    Anticoagulant long-term use     Cervical radiculopathy 2015    CHF (congestive heart failure)     Chronic combined systolic and diastolic heart failure 2013    EF 20% , improved with Medical therapy, negative PET     Chronic respiratory failure with hypoxia 2013    On home oxygen at 2-5 liters    Closed fracture of proximal end of right fibula 2016    Complications due to renal dialysis device, implant, and graft     DDD (degenerative disc disease), lumbar 2015    ESRD on hemodialysis 2016    Essential hypertension     Gout     Lateral meniscal tear 2016    Lumbar stenosis 2015    Paroxysmal atrial fibrillation 2014    Not on anticoagulation    Peripheral neuropathy 2013    Personal history of gastric ulcer 3/19/2013    Sarcoidosis, lung 2009    Diagnosed in  with ocular manifestation, on 4L home O2 and PO steroids    Secondary pulmonary hypertension 2015    Seizure disorder 3/19/2013    Seizures     Shingles 2013    Thyroid disease        Past Surgical History:   Procedure Laterality Date    ABDOMINAL SURGERY      CARDIAC PACEMAKER PLACEMENT       SECTION      x2    OTHER SURGICAL HISTORY      loop recorder implant    stent to fistula      VASCULAR SURGERY      fistula to L upper arm        Review of patient's allergies indicates:   Allergen Reactions    Bactrim [sulfamethoxazole-trimethoprim] Other (See Comments)     Causes renal failure    Nsaids (non-steroidal anti-inflammatory drug) Other (See Comments)     Renal failure     Current Facility-Administered Medications   Medication Frequency    0.9%  NaCl infusion PRN    acetaminophen tablet 650 mg Q6H PRN    atorvastatin tablet 80 mg Daily    bisacodyl EC tablet 5 mg Daily PRN    cinacalcet tablet 30 mg Daily with breakfast    digoxin tablet 0.125 mg  Every other day    fludrocortisone tablet 100 mcg BID    gabapentin capsule 300 mg TID    levetiracetam tablet 500 mg BID    lidocaine 5 % patch 2 patch Daily    midodrine tablet 10 mg Every Mon, Wed, Fri, Sun    midodrine tablet 15 mg Every Tues, Thurs, Sat    ondansetron injection 4 mg Q8H PRN    pantoprazole EC tablet 40 mg Daily    predniSONE tablet 10 mg Daily    sevelamer carbonate tablet 800 mg TID WM    tizanidine tablet 4 mg Q8H PRN    warfarin tablet 7.5 mg Daily     Family History     Problem Relation (Age of Onset)    Coronary artery disease Father    Diabetes Mother, Father, Maternal Grandmother    Hypertension Mother, Father    Kidney failure Mother    Lupus Sister        Social History Main Topics    Smoking status: Former Smoker     Packs/day: 0.50     Years: 10.00     Types: Cigarettes     Quit date: 4/22/1994    Smokeless tobacco: Never Used    Alcohol use No      Comment: rarely    Drug use: No    Sexual activity: Not on file     Review of Systems   All other systems reviewed and are negative.    Objective:     Vital Signs (Most Recent):  Temp: 97 °F (36.1 °C) (06/13/17 0815)  Pulse: 67 (06/13/17 1215)  Resp: 18 (06/13/17 1215)  BP: (!) 135/53 (06/13/17 1215)  SpO2: 97 % (06/13/17 0730)  O2 Device (Oxygen Therapy): nasal cannula (06/13/17 0815) Vital Signs (24h Range):  Temp:  [97 °F (36.1 °C)-97.8 °F (36.6 °C)] 97 °F (36.1 °C)  Pulse:  [57-97] 67  Resp:  [11-26] 18  SpO2:  [97 %-100 %] 97 %  BP: ()/(45-89) 135/53     Weight: 122 kg (269 lb) (06/12/17 0950)  Body mass index is 40.9 kg/m².  Body surface area is 2.42 meters squared.    I/O last 3 completed shifts:  In: 1050 [P.O.:900; I.V.:150]  Out: 0     Physical Exam   Constitutional: She is oriented to person, place, and time. She appears well-developed and well-nourished. No distress.   HENT:   Head: Normocephalic and atraumatic.   Eyes: Conjunctivae and EOM are normal.   Cardiovascular: Normal rate and regular rhythm.     Pulmonary/Chest: Effort normal and breath sounds normal.   Abdominal: Soft. Bowel sounds are normal.   Musculoskeletal: She exhibits edema. She exhibits no deformity.   Neurological: She is alert and oriented to person, place, and time.   Skin: Skin is warm and dry.       Significant Labs:  CBC:   Recent Labs  Lab 06/13/17  0911   WBC 8.62   RBC 4.01   HGB 12.0   HCT 38.4      MCV 96   MCH 29.9   MCHC 31.3*     CMP:   Recent Labs  Lab 06/13/17  0911   *   CALCIUM 8.8   ALBUMIN 2.9*  2.9*   *   K 5.1   CO2 23      BUN 56*   CREATININE 8.8*     All labs within the past 24 hours have been reviewed.    Significant Imaging:  Labs: Reviewed

## 2017-06-13 NOTE — ASSESSMENT & PLAN NOTE
- receives iHD TTS via LUE AVF. Treatment duration: 4.5 hours. EDW 115kg. Anuric.   - iHD today x 4 hours; UF 5L as tolerated  - patient being discharged today. Next outpatient HD on Thursday

## 2017-06-13 NOTE — NURSING TRANSFER
Nursing Transfer Note      6/12/2017     Transfer to: [address]    Transfer via: Stretcher    Transfer with: Telemetry monitor in place    Transported by: PCT    Medicines sent: N/A    Chart send with patient: Yes    Notified: son; report called to DEXTER Alfaro    Patient reassessed at: 1920

## 2017-06-13 NOTE — PROGRESS NOTES
Pt discharged per MD orders.  Tele and Visi discontinued.  Visi, fitbit, and iPad returned to station.  IV discontinued; catheter tip intact x1.  Medication list and prescriptions reviewed; no new prescriptions.  Pt verbalizes understanding of all written and verbal discharge instructions.  MIS performed and documented in chart. Pt awaiting family/escort arrival.  Will continue to monitor.

## 2017-06-13 NOTE — TELEPHONE ENCOUNTER
----- Message from Kelly Diaz MD sent at 6/13/2017  9:50 AM CDT -----  Regarding: Scheduling request  Good morning,    Would you mind scheduling this patient with Dr. Eller for regular follow up (no hurry)? We saw her in the hospital for her chronic derm issue and would like to see derm outpatient.    Thank you,    Kelly Diaz MD PGY2

## 2017-06-13 NOTE — PROGRESS NOTES
Pt is s/p successful av node ablation 6/12. She was given 7.5mg 6/12 in C and d/c 6/13 after HD treatment. Patient will be here 6/14 for other appts so will get an INR while here and make sure INR is increasing.

## 2017-06-13 NOTE — PLAN OF CARE
Problem: Patient Care Overview  Goal: Plan of Care Review  Outcome: Ongoing (interventions implemented as appropriate)  POC reviewed with patient. Pt remains free from fall/injury/trauma.  Pt is s/p ablation for Afib. R groin site remained CDI with no hematoma/bleeding. pedal pulses +2. Pt remained paced on the monitor, with heart rate in the 90s. HD planned for 6/13.  VSS and NADN. Will continue to monitor patient.

## 2017-06-13 NOTE — CONSULTS
[facility]  Nephrology  Consult Note    Patient Name: Sisi Medel  MRN: [id number]  Admission Date: 6/12/2017  Hospital Length of Stay: 0 days  Attending Provider: Bladimir Adorno MD   Primary Care Physician: Carlos A Caputo MD  Principal Problem:Atrial fibrillation    Inpatient consult to Nephrology  Consult performed by: TRAV ANTHONY  Consult ordered by: ROBB LEBRON  Reason for consult: ESRD        Subjective:     HPI: Ms. Medel is a 55 yo AAF with ESRD, HTN, pHTN on O2, sarcoidosis, and afib admitted for AVN RFA. Procedure took place yesterday. Patient reports feeling much better. She is hopeful that she can soon regain her strength and start to exercise again. She is currently 10kg above her dry weight. She attributes this to non-compliance with fluid restriction. She has also been drinking margaritas (without salt). She plans to start working on her diet again soon. She currently denies any SOB. Nephrology consulted for HD prior to discharge later today.   Ms. Medel receives iHD TTS via LUE AVF at [facility]. Nephrologist: Dr. Aaron Tesfaye. Treatment duration 4.5 hours. EDW 115kg. No longer makes urine.     Past Medical History:   Diagnosis Date    Anemia in ESRD (end-stage renal disease) 3/7/2016    Anticoagulant long-term use     Cervical radiculopathy 7/20/2015    CHF (congestive heart failure)     Chronic combined systolic and diastolic heart failure 6/14/2013    EF 20% 2013, improved with Medical therapy, negative PET 2013    Chronic respiratory failure with hypoxia 04/22/2013    On home oxygen at 2-5 liters    Closed fracture of proximal end of right fibula 6/27/2016    Complications due to renal dialysis device, implant, and graft     DDD (degenerative disc disease), lumbar 6/17/2015    ESRD on hemodialysis 2/23/2016    Essential hypertension     Gout     Lateral meniscal tear 5/31/2016    Lumbar stenosis 6/17/2015    Paroxysmal  atrial fibrillation 2014    Not on anticoagulation    Peripheral neuropathy 2013    Personal history of gastric ulcer 3/19/2013    Sarcoidosis, lung 2009    Diagnosed in  with ocular manifestation, on 4L home O2 and PO steroids    Secondary pulmonary hypertension 2015    Seizure disorder 3/19/2013    Seizures     Shingles 2013    Thyroid disease        Past Surgical History:   Procedure Laterality Date    ABDOMINAL SURGERY      CARDIAC PACEMAKER PLACEMENT       SECTION      x2    OTHER SURGICAL HISTORY      loop recorder implant    stent to fistula      VASCULAR SURGERY      fistula to L upper arm        Review of patient's allergies indicates:   Allergen Reactions    Bactrim [sulfamethoxazole-trimethoprim] Other (See Comments)     Causes renal failure    Nsaids (non-steroidal anti-inflammatory drug) Other (See Comments)     Renal failure     Current Facility-Administered Medications   Medication Frequency    0.9%  NaCl infusion PRN    acetaminophen tablet 650 mg Q6H PRN    atorvastatin tablet 80 mg Daily    bisacodyl EC tablet 5 mg Daily PRN    cinacalcet tablet 30 mg Daily with breakfast    digoxin tablet 0.125 mg Every other day    fludrocortisone tablet 100 mcg BID    gabapentin capsule 300 mg TID    levetiracetam tablet 500 mg BID    lidocaine 5 % patch 2 patch Daily    midodrine tablet 10 mg Every Mon, Wed, Fri, Sun    midodrine tablet 15 mg Every Tues, Thurs, Sat    ondansetron injection 4 mg Q8H PRN    pantoprazole EC tablet 40 mg Daily    predniSONE tablet 10 mg Daily    sevelamer carbonate tablet 800 mg TID WM    tizanidine tablet 4 mg Q8H PRN    warfarin tablet 7.5 mg Daily     Family History     Problem Relation (Age of Onset)    Coronary artery disease Father    Diabetes Mother, Father, Maternal Grandmother    Hypertension Mother, Father    Kidney failure Mother    Lupus Sister        Social History Main Topics    Smoking status: Former  Smoker     Packs/day: 0.50     Years: 10.00     Types: Cigarettes     Quit date: 4/22/1994    Smokeless tobacco: Never Used    Alcohol use No      Comment: rarely    Drug use: No    Sexual activity: Not on file     Review of Systems   All other systems reviewed and are negative.    Objective:     Vital Signs (Most Recent):  Temp: 97 °F (36.1 °C) (06/13/17 0815)  Pulse: 67 (06/13/17 1215)  Resp: 18 (06/13/17 1215)  BP: (!) 135/53 (06/13/17 1215)  SpO2: 97 % (06/13/17 0730)  O2 Device (Oxygen Therapy): nasal cannula (06/13/17 0815) Vital Signs (24h Range):  Temp:  [97 °F (36.1 °C)-97.8 °F (36.6 °C)] 97 °F (36.1 °C)  Pulse:  [57-97] 67  Resp:  [11-26] 18  SpO2:  [97 %-100 %] 97 %  BP: ()/(45-89) 135/53     Weight: 122 kg (269 lb) (06/12/17 0950)  Body mass index is 40.9 kg/m².  Body surface area is 2.42 meters squared.    I/O last 3 completed shifts:  In: 1050 [P.O.:900; I.V.:150]  Out: 0     Physical Exam   Constitutional: She is oriented to person, place, and time. She appears well-developed and well-nourished. No distress.   HENT:   Head: Normocephalic and atraumatic.   Eyes: Conjunctivae and EOM are normal.   Cardiovascular: Normal rate and regular rhythm.    Pulmonary/Chest: Effort normal and breath sounds normal.   Abdominal: Soft. Bowel sounds are normal.   Musculoskeletal: She exhibits edema. She exhibits no deformity.   Neurological: She is alert and oriented to person, place, and time.   Skin: Skin is warm and dry.       Significant Labs:  CBC:   Recent Labs  Lab 06/13/17  0911   WBC 8.62   RBC 4.01   HGB 12.0   HCT 38.4      MCV 96   MCH 29.9   MCHC 31.3*     CMP:   Recent Labs  Lab 06/13/17 0911   *   CALCIUM 8.8   ALBUMIN 2.9*  2.9*   *   K 5.1   CO2 23      BUN 56*   CREATININE 8.8*     All labs within the past 24 hours have been reviewed.    Significant Imaging:  Labs: Reviewed    Assessment/Plan:     ESRD on hemodialysis    - receives iHD TTS via LUE AVF. Treatment  duration: 4.5 hours. EDW 115kg. Anuric.   - iHD today x 4 hours; UF 5L as tolerated  - patient being discharged today. Next outpatient HD on Thursday            Isaura Rascon, PGY-4  Nephrology Fellow  [facility]  Pager: [phone]

## 2017-06-13 NOTE — PLAN OF CARE
Pt AAOx4. No complaints of pain or nausea. Dressing to right groin remains CDI. Neuro checks intact. VSS. Safety maintained.

## 2017-06-13 NOTE — PROGRESS NOTES
Pt escorted to dialysis via stretcher for HD.   Pre-procedure orders implemented as ordered.  Pt showing no S/S of distress; AAOx3.  Pt transported with telemetry and O2.  Visi removed and returned to station.  Awaiting return.    1430  Pt from dialysis via stretcher with escort.  Pt in no distress, resting comfortably in bed.   Pt verbalizes understanding of plan of care.  Bed locked, in lowest position, siderails up x2.  Call bell in reach.  Pt instructed to call for assistance.  Will continue to monitor.

## 2017-06-14 ENCOUNTER — OFFICE VISIT (OUTPATIENT)
Dept: PULMONOLOGY | Facility: CLINIC | Age: 57
End: 2017-06-14
Payer: MEDICARE

## 2017-06-14 ENCOUNTER — HOSPITAL ENCOUNTER (OUTPATIENT)
Dept: PULMONOLOGY | Facility: CLINIC | Age: 57
Discharge: HOME | End: 2017-06-14
Payer: MEDICARE

## 2017-06-14 ENCOUNTER — ANTI-COAG VISIT (OUTPATIENT)
Dept: CARDIOLOGY | Facility: CLINIC | Age: 57
End: 2017-06-14
Payer: MEDICARE

## 2017-06-14 VITALS
BODY MASS INDEX: 42.89 KG/M2 | HEART RATE: 90 BPM | HEIGHT: 68 IN | RESPIRATION RATE: 16 BRPM | WEIGHT: 283 LBS | DIASTOLIC BLOOD PRESSURE: 68 MMHG | OXYGEN SATURATION: 98 % | SYSTOLIC BLOOD PRESSURE: 118 MMHG

## 2017-06-14 DIAGNOSIS — I50.42 CHRONIC COMBINED SYSTOLIC AND DIASTOLIC HEART FAILURE: Chronic | ICD-10-CM

## 2017-06-14 DIAGNOSIS — I48.19 PERSISTENT ATRIAL FIBRILLATION: ICD-10-CM

## 2017-06-14 DIAGNOSIS — D86.0 PULMONARY SARCOIDOSIS: Primary | Chronic | ICD-10-CM

## 2017-06-14 DIAGNOSIS — Z99.2 ESRD ON HEMODIALYSIS: Chronic | ICD-10-CM

## 2017-06-14 DIAGNOSIS — Z79.01 LONG-TERM (CURRENT) USE OF ANTICOAGULANTS: Primary | ICD-10-CM

## 2017-06-14 DIAGNOSIS — D86.0 PULMONARY SARCOIDOSIS: Chronic | ICD-10-CM

## 2017-06-14 DIAGNOSIS — J96.11 CHRONIC RESPIRATORY FAILURE WITH HYPOXIA: Chronic | ICD-10-CM

## 2017-06-14 DIAGNOSIS — E66.01 MORBID OBESITY WITH BMI OF 40.0-44.9, ADULT: Chronic | ICD-10-CM

## 2017-06-14 DIAGNOSIS — J98.4 CRLD (CHRONIC RESTRICTIVE LUNG DISEASE): ICD-10-CM

## 2017-06-14 DIAGNOSIS — N18.6 ESRD ON HEMODIALYSIS: Chronic | ICD-10-CM

## 2017-06-14 LAB
INR PPP: 1.9 (ref 2–3)
PRE FEV1 FVC: 84
PRE FEV1: 1.79
PRE FVC: 2.13
PREDICTED FEV1 FVC: 79
PREDICTED FEV1: 2.83
PREDICTED FVC: 3.54

## 2017-06-14 PROCEDURE — 99211 OFF/OP EST MAY X REQ PHY/QHP: CPT | Mod: 25,S$GLB,, | Performed by: PHARMACIST

## 2017-06-14 PROCEDURE — 94010 BREATHING CAPACITY TEST: CPT | Mod: S$GLB,,, | Performed by: INTERNAL MEDICINE

## 2017-06-14 PROCEDURE — 99999 PR PBB SHADOW E&M-EST. PATIENT-LVL III: CPT | Mod: PBBFAC,,, | Performed by: INTERNAL MEDICINE

## 2017-06-14 PROCEDURE — 94729 DIFFUSING CAPACITY: CPT | Mod: S$GLB,,, | Performed by: INTERNAL MEDICINE

## 2017-06-14 PROCEDURE — 99214 OFFICE O/P EST MOD 30 MIN: CPT | Mod: 25,S$GLB,, | Performed by: INTERNAL MEDICINE

## 2017-06-14 PROCEDURE — 99499 UNLISTED E&M SERVICE: CPT | Mod: S$GLB,,, | Performed by: INTERNAL MEDICINE

## 2017-06-14 PROCEDURE — 85610 PROTHROMBIN TIME: CPT | Mod: QW,S$GLB,, | Performed by: PHARMACIST

## 2017-06-14 NOTE — PROGRESS NOTES
INR improved from Monday, moving in the right direction. To assure INR continues to move into range I will icnrease dose slightly later this week since she just had RFA. She is on dialysis and cannot take lovenox. The earliest she can return is 6/19

## 2017-06-15 NOTE — PATIENT INSTRUCTIONS
ACE (phone report).  Continue maintenance dose of Prednisone and O2.  Return visit 4 months with Spirometry and DLCO.

## 2017-06-15 NOTE — PROGRESS NOTES
Subjective:       Patient ID: Sisi Medel is a 56 y.o. female.    Chief Complaint: Sarcoidosis    HPI HISTORY OF PRESENT ILLNESS:  Mrs. Medel is a 56-year-old former smoker, who   returns for an interval assessment of sarcoidosis.  Her most recent previous   visit here was in March of this year.  During these past several months, she has   had a fairly stable respiratory status despite her ongoing medical therapy for   chronic heart failure.  She is not having any current symptoms to suggest active   respiratory infection.  She continues to use oxygen with daytime activities   and during sleep.  She has not had to take antibiotics in recent months for   respiratory infections.  She remains on a 10 mg maintenance dose of prednisone   daily.    Ms. Medel reports that she recently had a problem with an elevated serum   calcium level.  This was noted and treated by her nephrologist.  Notes from her   nephrologist apparently do not come into the electronic medical record here.    Ms. Medel has taken Prilosec for treatment of upper GI symptoms related to   gastroesophageal reflux.  She feels that her upper GI symptoms have been   inactive in recent months.  She is interested in having a trial off the   Prilosec.    DATA:  I have reviewed the images from a chest x-ray done in April.  The cardiac   silhouette remains enlarged.  Pulmonary vasculature is not as increased in this   radiograph as it had been in prior x-rays from January and February of this   year.  Incidental note is made of the pacemaker on the right.    Pulmonary function studies done today show the forced vital capacity is 2.13 L,   which is 60% of predicted.  The FEV1 is 1.79 L, or 63% of predicted.  The ratio   of these values is 84%.  The diffusion capacity is 9.3, which is 45% of the   expected value.  When today's study results are compared to previous studies   dating back over the past two years, the current values are within the  range of   those seen in previous studies.  Current results continue to indicate a moderate   restrictive ventilatory impairment.      CB/HN  dd: 06/14/2017 20:36:30 (CDT)  td: 06/15/2017 17:37:08 (CDT)  Doc ID   #[id number]  Job ID #[id number]    CC:       Review of Systems   Constitutional: Negative for fever and fatigue.   HENT: Negative for postnasal drip, sinus pressure, voice change and congestion.    Respiratory: Positive for shortness of breath and dyspnea on extertion. Negative for cough, sputum production and wheezing.    Cardiovascular: Positive for leg swelling. Negative for chest pain.   Genitourinary: Negative for difficulty urinating.   Musculoskeletal: Positive for arthralgias. Negative for back pain.   Skin: Negative for rash.   Gastrointestinal: Negative for abdominal pain and acid reflux.   Neurological: Negative for dizziness and weakness.   Hematological: Negative for adenopathy.       Objective:      Physical Exam   Constitutional: She is oriented to person, place, and time. She appears well-developed. She is obese.   HENT:   Head: Normocephalic.   Nose: Nose normal.   Mouth/Throat: No oropharyngeal exudate.   Neck: Normal range of motion. No JVD present. No tracheal deviation present. No thyromegaly present.   Cardiovascular: Normal rate, regular rhythm and normal heart sounds.    No murmur heard.  Pulmonary/Chest: Symmetric chest wall expansion. No stridor. She has no wheezes. She has no rhonchi. She has no rales. She exhibits no tenderness.   Abdominal: Soft. There is no tenderness.   Musculoskeletal: She exhibits edema (mild LE edema).   Lymphadenopathy:     She has no cervical adenopathy.   Neurological: She is alert and oriented to person, place, and time.   Skin: Skin is warm and dry. No rash noted. No erythema.   Psychiatric: She has a normal mood and affect.   Vitals reviewed.    Personal Diagnostic Review    No flowsheet data found.      Assessment:       1. Pulmonary sarcoidosis    2.  Chronic combined systolic and diastolic heart failure    3. Chronic respiratory failure with hypoxia    4. CRLD (chronic restrictive lung disease)    5. ESRD on hemodialysis    6. Morbid obesity with BMI of 40.0-44.9, adult        Outpatient Encounter Prescriptions as of 6/14/2017   Medication Sig Dispense Refill    ACZONE 5 % topical gel Apply to face every morning  2    ammonium lactate (LAC-HYDRIN) 12 % lotion Apply topically as needed (to legs).   2    atorvastatin (LIPITOR) 80 MG tablet TAKE 1 TABLET(80 MG) BY MOUTH EVERY DAY (Patient taking differently: TAKE 1 TABLET(80 MG) BY MOUTH EVERY NIGHT) 90 tablet 3    cholecalciferol, vitamin D3, 1,000 unit capsule Take 1 capsule (1,000 Units total) by mouth once daily. (Patient taking differently: Take 1,000 Units by mouth every evening. )  0    diclofenac sodium 1 % Gel Apply 2 g topically every 8 (eight) hours as needed. 100 g 0    digoxin (LANOXIN) 125 mcg tablet Take 1 tablet (0.125 mg total) by mouth every other day. 15 tablet 1    econazole nitrate 1 % cream Apply topically 2 (two) times daily. As needed for fungal skin infection - use for 2-4 weeks 30 g 1    fludrocortisone (FLORINEF) 0.1 mg Tab Take 100 mcg by mouth 2 (two) times daily.      gabapentin (NEURONTIN) 300 MG capsule TAKE ONE CAPSULE BY MOUTH THREE TIMES DAILY 270 capsule 3    inulin-sorbitol 2 gram Chew Take 2 tablets by mouth 2 (two) times daily.  0    ketorolac 0.5% (ACULAR) 0.5 % Drop instill one drop into both eyes every morning  3    levetiracetam (KEPPRA) 500 MG Tab Take 1 tablet (500 mg total) by mouth 2 (two) times daily. 60 tablet 5    lidocaine (LIDODERM) 5 % Place 2 patches onto the skin once daily. One on each foot. Remove & Discard patch within 12 hours or as directed by MD Short patch 2    midodrine (PROAMATINE) 5 MG Tab Take 2 tablets (10 mg total) by mouth every Mon, Wed, Fri, Sun. 32 tablet 11    midodrine (PROAMATINE) 5 MG Tab Take 3 tablets (15 mg total) by mouth  every Tues, Thurs, Sat. Take 2 tablets (10 mg total) by mouth on non dialysis days.      omeprazole (PRILOSEC) 20 MG capsule Take 1 capsule (20 mg total) by mouth once daily. 30 capsule 5    polyethylene glycol (GLYCOLAX) 17 gram/dose powder Take 17 g by mouth once daily. Dissolve in juice. 510 g 0    prednisoLONE acetate (PRED FORTE) 1 % DrpS INSTILL ONE DROP INTO  LEFT EYE THREE TIMES A DAY  3    predniSONE (DELTASONE) 10 MG tablet TAKE 1 TABLET BY MOUTH EVERY DAY WITH BREAKFAST 90 tablet 0    RENVELA 800 mg Tab TAKE 1 TABLET BY MOUTH THREE TIMES DAILY WITH MEALS 90 tablet 0    SENSIPAR 30 mg Tab       tizanidine 4 mg Cap Take 1 capsule by mouth daily as needed. 60 capsule 0    warfarin (COUMADIN) 5 MG tablet Take 1 tablet (5 mg total) by mouth Daily. [facility] (Patient taking differently: Take 5 mg by mouth every evening. Per [facility]) 90 tablet 3    [] cefazolin (ANCEF) 2 gram in dextrose 5% 50 mL IVPB (premix)       [DISCONTINUED] 0.9%  NaCl infusion       [DISCONTINUED] 0.9%  NaCl infusion       [DISCONTINUED] fentaNYL injection       [DISCONTINUED] midazolam injection        No facility-administered encounter medications on file as of 2017.      Orders Placed This Encounter   Procedures    Angiotensin converting enzyme     Standing Status:   Future     Number of Occurrences:   1     Standing Expiration Date:   2018    Spirometry without Bronchodilator     Standing Status:   Future     Standing Expiration Date:   2018    DLCO-Carbon Monoxide Diffusing Capacity     Standing Status:   Future     Standing Expiration Date:   2018     Plan:     ACE (phone report).  Continue maintenance dose of Prednisone and O2.  Return visit 4 months with Spirometry and DLCO.

## 2017-06-16 DIAGNOSIS — M62.838 MUSCLE SPASM: ICD-10-CM

## 2017-06-16 RX ORDER — TIZANIDINE HYDROCHLORIDE 4 MG/1
1 CAPSULE, GELATIN COATED ORAL DAILY PRN
Qty: 60 CAPSULE | Refills: 0 | Status: SHIPPED | OUTPATIENT
Start: 2017-06-16 | End: 2017-08-25 | Stop reason: SDUPTHER

## 2017-06-19 DIAGNOSIS — G40.909 SEIZURE DISORDER: Chronic | ICD-10-CM

## 2017-06-19 RX ORDER — DIGOXIN 125 MCG
0.12 TABLET ORAL EVERY OTHER DAY
Qty: 15 TABLET | Refills: 1 | Status: SHIPPED | OUTPATIENT
Start: 2017-06-19 | End: 2018-01-26 | Stop reason: ALTCHOICE

## 2017-06-19 RX ORDER — LEVETIRACETAM 500 MG/1
500 TABLET ORAL 2 TIMES DAILY
Qty: 60 TABLET | Refills: 5 | Status: SHIPPED | OUTPATIENT
Start: 2017-06-19 | End: 2017-12-19 | Stop reason: SDUPTHER

## 2017-06-19 NOTE — TELEPHONE ENCOUNTER
----- Message from Chhaya De La Rosa sent at 6/19/2017 10:42 AM CDT -----  Contact: pt [phone]  Pt is calling for a refill of levetiracetam (KEPPRA) 500 MG Tab.pt states she hasn't taken her medication for 3 days.thanks           [facility] [address] [place] [address] [facility] [address] [facility] [address] [facility]  [address] [facility] [address]  Phone: [phone] Fax: [phone]

## 2017-06-19 NOTE — TELEPHONE ENCOUNTER
----- Message from Sherlyn Branch MA sent at 6/19/2017 10:58 AM CDT -----  Contact: self  Pt. Is calling about  a refill script for Digoxin 125mcg to [facility]. States she 's a pt. Of .Med was given in ER by  Please call.

## 2017-06-23 NOTE — PROGRESS NOTES
Patient called 6/23/17 this am to cancel appointment, she fell and cannot walk on legs right now. Asked patient was there any (Bruising) she said yes, advise to the ER. She refused will reschdule appointment later time.

## 2017-06-28 ENCOUNTER — HOSPITAL ENCOUNTER (EMERGENCY)
Facility: HOSPITAL | Age: 57
Discharge: HOME | End: 2017-06-28
Attending: EMERGENCY MEDICINE | Admitting: EMERGENCY MEDICINE
Payer: MEDICARE

## 2017-06-28 VITALS
TEMPERATURE: 99 F | DIASTOLIC BLOOD PRESSURE: 76 MMHG | RESPIRATION RATE: 16 BRPM | OXYGEN SATURATION: 99 % | HEIGHT: 69 IN | BODY MASS INDEX: 39.48 KG/M2 | WEIGHT: 266.56 LBS | HEART RATE: 93 BPM | SYSTOLIC BLOOD PRESSURE: 123 MMHG
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
## 2017-06-28 DIAGNOSIS — S82.409A FIBULA FRACTURE: Primary | ICD-10-CM

## 2017-06-28 PROCEDURE — 99284 EMERGENCY DEPT VISIT MOD MDM: CPT | Mod: ,,, | Performed by: EMERGENCY MEDICINE

## 2017-06-28 PROCEDURE — 99284 EMERGENCY DEPT VISIT MOD MDM: CPT

## 2017-06-28 PROCEDURE — 25000003 PHARM REV CODE 250: Performed by: EMERGENCY MEDICINE

## 2017-06-28 RX ORDER — ACETAMINOPHEN 500 MG
1000 TABLET ORAL
Status: COMPLETED | OUTPATIENT
Start: 2017-06-28 | End: 2017-06-28

## 2017-06-28 RX ADMIN — ACETAMINOPHEN 1000 MG: 500 TABLET ORAL at 02:06

## 2017-06-28 NOTE — ED NOTES
Pt identifiers Sisi Medel were checked and are correct  LOC: The patient is awake, alert, aware of environment with an appropriate affect. Oriented x3, speaking appropriately  APPEARANCE: Pt resting comfortably, in no acute distress, pt is clean and well groomed, clothing properly fastened  SKIN: Skin warm, dry and intact, normal skin turgor, moist mucus membranes Bruising noted to right upper and lower arm, left lower leg   RESPIRATORY: Airway is open and patent, respirations are spontaneous, even and unlabored, normal effort and rate Arrived in INTAKE with O2 at 4L/min per nasal cannula Pt states she is on O2 at home  CARDIAC: Normal rate and rhythm,  capillary refill < 3 seconds, bilateral radial pulses 2+ dialysis access site with audible bruit noted to left upper arm   ABDOMEN: Soft, nontender, nondistended.  NEUROLOGIC:  facial expression is symmetrical, patient moving all extremities spontaneously, normal sensation in all extremities when touched with a finger.  Follows all commands appropriately  MUSCULOSKELETAL: Swelling noted to left lower leg

## 2017-06-28 NOTE — ED TRIAGE NOTES
Pt fell down at home 6 days ago when her whole body gave out as stated by pt,  Yesterday her left leg gave out again.  Pt complains of pain and swelling to left lower leg  Rates left lower leg pain an 8/10

## 2017-06-28 NOTE — ED PROVIDER NOTES
Encounter Date: 6/28/2017    SCRIBE #1 NOTE: I, Belgica Perez, am scribing for, and in the presence of,  Dr. Potts. I have scribed the entire note.       History     Chief Complaint   Patient presents with    Fall     head and right leg pain, patient fell on Thursday      Time patient was seen by the provider: 1:38 PM      The patient is a 56 y.o. female with hx of: ESRD, CHF, HTN, gout, PAF on Coumadin, gastric ulcer, sarcoidosis on home O2, seizures, pulmonary HTN, and thyroid disease that presents to the ED status post mechanical fall which occurred 1 week ago with a complaint of left lower extremity pain and ecchymosis described as a burning that is exacerbated with ambulation. The pain is most severe to the posterior lower leg, though the anterior leg is also painful. Patient reports falling and landing on her left leg which went back behind her. She also notes ecchymosis to the right upper extremity. Patient denies any other acute changes from baseline.           Review of patient's allergies indicates:   Allergen Reactions    Bactrim [sulfamethoxazole-trimethoprim] Other (See Comments)     Causes renal failure    Nsaids (non-steroidal anti-inflammatory drug) Other (See Comments)     Renal failure     Past Medical History:   Diagnosis Date    Anemia in ESRD (end-stage renal disease) 3/7/2016    Anticoagulant long-term use     Cervical radiculopathy 7/20/2015    CHF (congestive heart failure)     Chronic combined systolic and diastolic heart failure 6/14/2013    EF 20% 2013, improved with Medical therapy, negative PET 2013    Chronic respiratory failure with hypoxia 04/22/2013    On home oxygen at 2-5 liters    Closed fracture of proximal end of right fibula 6/27/2016    Complications due to renal dialysis device, implant, and graft     DDD (degenerative disc disease), lumbar 6/17/2015    ESRD on hemodialysis 2/23/2016    Essential hypertension     Gout     Lateral meniscal tear 5/31/2016     Lumbar stenosis 2015    Paroxysmal atrial fibrillation 2014    Not on anticoagulation    Peripheral neuropathy 2013    Personal history of gastric ulcer 3/19/2013    Sarcoidosis, lung 2009    Diagnosed in  with ocular manifestation, on 4L home O2 and PO steroids    Secondary pulmonary hypertension 2015    Seizure disorder 3/19/2013    Seizures     Shingles 2013    Thyroid disease      Past Surgical History:   Procedure Laterality Date    ABDOMINAL SURGERY      CARDIAC PACEMAKER PLACEMENT       SECTION      x2    OTHER SURGICAL HISTORY      loop recorder implant    stent to fistula      VASCULAR SURGERY      fistula to L upper arm      Family History   Problem Relation Age of Onset    Kidney failure Mother     Hypertension Mother     Diabetes Mother     Coronary artery disease Father     Hypertension Father     Diabetes Father     Lupus Sister     Diabetes Maternal Grandmother     Colon cancer Neg Hx     Ovarian cancer Neg Hx     Breast cancer Neg Hx     Melanoma Neg Hx      Social History   Substance Use Topics    Smoking status: Former Smoker     Packs/day: 0.50     Years: 10.00     Types: Cigarettes     Quit date: 1994    Smokeless tobacco: Never Used    Alcohol use No      Comment: rarely     Review of Systems   Musculoskeletal: Positive for gait problem (secondary to pain).        Positive for left lower extremity pain    Skin:        Positive for ecchymosis to right upper and left lower extremities   Neurological: Negative for syncope.       Physical Exam     Initial Vitals [17 1146]   BP Pulse Resp Temp SpO2   102/66 90 16 97.9 °F (36.6 °C) 99 %      MAP       78         Physical Exam    Nursing note and vitals reviewed.  Constitutional: She appears well-developed and well-nourished. She is not diaphoretic. No distress.   HENT:   Head: Normocephalic and atraumatic.   Musculoskeletal: She exhibits tenderness.   Tenderness all  over left lower extremity with bruising and ecchymosis laterally. No pain when I stressed the tibia.    Neurological: She is alert and oriented to person, place, and time.   Skin: Skin is warm and dry. No rash noted. No pallor.   Psychiatric: She has a normal mood and affect. Her behavior is normal. Judgment and thought content normal.         ED Course   Procedures  Labs Reviewed - No data to display       X-Rays:   Independently Interpreted Readings:   Other Readings:  X-ray left tib/fib - proximal fibular fracture oblique  X-ray left ankle - no ankle fracture     Medical Decision Making:   History:   Old Medical Records: I decided to obtain old medical records.  Independently Interpreted Test(s):   I have ordered and independently interpreted X-rays - see prior notes.  Clinical Tests:   Radiological Study: Ordered and Reviewed  ED Management:  My initial differential diagnoses include but are not limited to: occult fibula fracture, muscle strain, bone bruise, contusion. Will do x-ray to exclude fracture. Her chronic medical problems are stable at this time.    No ankle fracture. Discussed with orthopedics who advise discharge with crutches as needed and weight bearing as tolerated. Patient is to follow up with orthopedics in 1 week.   Other:   I have discussed this case with another health care provider.            Scribe Attestation:   Scribe #1: I performed the above scribed service and the documentation accurately describes the services I performed. I attest to the accuracy of the note.    Attending Attestation:           Physician Attestation for Scribe:  Physician Attestation Statement for Scribe #1: I, Dr. Potts, reviewed documentation, as scribed by Belgica Perez in my presence, and it is both accurate and complete.                 ED Course     Clinical Impression:   The primary encounter diagnosis was Fibula fracture. A diagnosis of Fall, accidental, initial encounter was also pertinent to this  visit.    Disposition:   Disposition: Discharged  Condition: Stable                        Cyrus Potts MD  06/29/17 2340

## 2017-07-05 ENCOUNTER — ANTI-COAG VISIT (OUTPATIENT)
Dept: CARDIOLOGY | Facility: CLINIC | Age: 57
End: 2017-07-05

## 2017-07-05 ENCOUNTER — LAB VISIT (OUTPATIENT)
Dept: LAB | Facility: HOSPITAL | Age: 57
End: 2017-07-05
Payer: MEDICARE

## 2017-07-05 DIAGNOSIS — Z79.01 LONG-TERM (CURRENT) USE OF ANTICOAGULANTS: ICD-10-CM

## 2017-07-05 DIAGNOSIS — I48.0 PAROXYSMAL ATRIAL FIBRILLATION: ICD-10-CM

## 2017-07-05 DIAGNOSIS — I48.19 PERSISTENT ATRIAL FIBRILLATION: ICD-10-CM

## 2017-07-05 LAB
INR PPP: 5
PROTHROMBIN TIME: 51.3 SEC

## 2017-07-05 PROCEDURE — 85610 PROTHROMBIN TIME: CPT

## 2017-07-05 PROCEDURE — 36415 COLL VENOUS BLD VENIPUNCTURE: CPT

## 2017-07-07 ENCOUNTER — LAB VISIT (OUTPATIENT)
Dept: LAB | Facility: HOSPITAL | Age: 57
End: 2017-07-07
Attending: INTERNAL MEDICINE
Payer: MEDICARE

## 2017-07-07 ENCOUNTER — ANTI-COAG VISIT (OUTPATIENT)
Dept: CARDIOLOGY | Facility: CLINIC | Age: 57
End: 2017-07-07

## 2017-07-07 DIAGNOSIS — Z79.01 LONG-TERM (CURRENT) USE OF ANTICOAGULANTS: ICD-10-CM

## 2017-07-07 DIAGNOSIS — I48.0 PAROXYSMAL ATRIAL FIBRILLATION: ICD-10-CM

## 2017-07-07 DIAGNOSIS — I48.19 PERSISTENT ATRIAL FIBRILLATION: ICD-10-CM

## 2017-07-07 LAB
INR PPP: 4.2
PROTHROMBIN TIME: 42.2 SEC

## 2017-07-07 PROCEDURE — 85610 PROTHROMBIN TIME: CPT

## 2017-07-07 PROCEDURE — 36415 COLL VENOUS BLD VENIPUNCTURE: CPT

## 2017-07-12 ENCOUNTER — ANTI-COAG VISIT (OUTPATIENT)
Dept: CARDIOLOGY | Facility: CLINIC | Age: 57
End: 2017-07-12

## 2017-07-12 ENCOUNTER — LAB VISIT (OUTPATIENT)
Dept: LAB | Facility: HOSPITAL | Age: 57
End: 2017-07-12
Payer: MEDICARE

## 2017-07-12 DIAGNOSIS — I48.19 PERSISTENT ATRIAL FIBRILLATION: ICD-10-CM

## 2017-07-12 DIAGNOSIS — Z79.01 LONG-TERM (CURRENT) USE OF ANTICOAGULANTS: ICD-10-CM

## 2017-07-12 DIAGNOSIS — I48.0 PAROXYSMAL ATRIAL FIBRILLATION: ICD-10-CM

## 2017-07-12 LAB
INR PPP: 2.1
PROTHROMBIN TIME: 20.6 SEC

## 2017-07-12 PROCEDURE — 85610 PROTHROMBIN TIME: CPT

## 2017-07-12 PROCEDURE — 36415 COLL VENOUS BLD VENIPUNCTURE: CPT

## 2017-07-16 VITALS
SYSTOLIC BLOOD PRESSURE: 140 MMHG | RESPIRATION RATE: 18 BRPM | TEMPERATURE: 98 F | DIASTOLIC BLOOD PRESSURE: 80 MMHG | HEART RATE: 89 BPM | WEIGHT: 271.19 LBS | HEIGHT: 69 IN | BODY MASS INDEX: 40.17 KG/M2 | OXYGEN SATURATION: 100 %

## 2017-07-16 PROCEDURE — 12002 RPR S/N/AX/GEN/TRNK2.6-7.5CM: CPT | Mod: ,,, | Performed by: EMERGENCY MEDICINE

## 2017-07-16 PROCEDURE — 12002 RPR S/N/AX/GEN/TRNK2.6-7.5CM: CPT

## 2017-07-16 PROCEDURE — 99284 EMERGENCY DEPT VISIT MOD MDM: CPT | Mod: 25

## 2017-07-16 PROCEDURE — 99284 EMERGENCY DEPT VISIT MOD MDM: CPT | Mod: 25,,, | Performed by: EMERGENCY MEDICINE

## 2017-07-17 ENCOUNTER — HOSPITAL ENCOUNTER (EMERGENCY)
Facility: HOSPITAL | Age: 57
Discharge: HOME | End: 2017-07-17
Attending: EMERGENCY MEDICINE
Payer: MEDICARE
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
## 2017-07-17 DIAGNOSIS — S91.011A LACERATION OF ANKLE, RIGHT, INITIAL ENCOUNTER: Primary | ICD-10-CM

## 2017-07-17 PROCEDURE — 12002 RPR S/N/AX/GEN/TRNK2.6-7.5CM: CPT

## 2017-07-17 PROCEDURE — 25000003 PHARM REV CODE 250: Performed by: EMERGENCY MEDICINE

## 2017-07-17 RX ORDER — LIDOCAINE HYDROCHLORIDE AND EPINEPHRINE 10; 10 MG/ML; UG/ML
10 INJECTION, SOLUTION INFILTRATION; PERINEURAL
Status: COMPLETED | OUTPATIENT
Start: 2017-07-17 | End: 2017-07-17

## 2017-07-17 RX ADMIN — LIDOCAINE HYDROCHLORIDE,EPINEPHRINE BITARTRATE 10 ML: 10; .01 INJECTION, SOLUTION INFILTRATION; PERINEURAL at 01:07

## 2017-07-17 NOTE — ED NOTES
Pt identifiers Sisi Sims and correct  LOC: The patient is awake, alert, aware of environment with an appropriate affect. Oriented x3, speaking appropriately  APPEARANCE: Pt resting comfortably, in no acute distress, pt is clean and well groomed, clothing properly fastened  RESPIRATORY: Airway is open and patent, respirations are spontaneous, even and unlabored, normal effort and rate  CARDIAC: Normal rate and rhythm, no peripheral edema noted, capillary refill < 3 seconds, bilateral radial pulses 2+  ABDOMEN: Soft, nontender, nondistended. Bowel sounds present   NEUROLOGIC: PERRL, facial expression is symmetrical, patient moving all extremities spontaneously, normal sensation in all extremities when touched with a finger.  Follows all commands appropriately  MUSCULOSKELETAL: No obvious deformities.

## 2017-07-17 NOTE — ED PROVIDER NOTES
Encounter Date: 7/16/2017    SCRIBE #1 NOTE: I, Emory Gomez, am scribing for, and in the presence of,  Dr. Potts. I have scribed the following portions of the note - the Resident attestation.       History     Chief Complaint   Patient presents with    Laceration     right leg laceration, bleeding controlled at this time     Sisi Medel is a 57 yo F with CHF, ESRD here after she sustained a laceration to her R medial ankle after her oxygen tank fell and the valve cut her. Tetanus up to date. No numbness, tingling, loss of sensation. She does take coumadin. Recent INR of 2.       The history is provided by the patient.     Review of patient's allergies indicates:   Allergen Reactions    Bactrim [sulfamethoxazole-trimethoprim] Other (See Comments)     Causes renal failure    Nsaids (non-steroidal anti-inflammatory drug) Other (See Comments)     Renal failure     Past Medical History:   Diagnosis Date    Anemia in ESRD (end-stage renal disease) 3/7/2016    Anticoagulant long-term use     Cervical radiculopathy 7/20/2015    CHF (congestive heart failure)     Chronic combined systolic and diastolic heart failure 6/14/2013    EF 20% 2013, improved with Medical therapy, negative PET 2013    Chronic respiratory failure with hypoxia 04/22/2013    On home oxygen at 2-5 liters    Closed fracture of proximal end of right fibula 6/27/2016    Complications due to renal dialysis device, implant, and graft     DDD (degenerative disc disease), lumbar 6/17/2015    ESRD on hemodialysis 2/23/2016    Essential hypertension     Gout     Lateral meniscal tear 5/31/2016    Lumbar stenosis 6/17/2015    Paroxysmal atrial fibrillation 5/16/2014    Not on anticoagulation    Peripheral neuropathy 11/13/2013    Personal history of gastric ulcer 3/19/2013    Sarcoidosis, lung 2009    Diagnosed in 2009 with ocular manifestation, on 4L home O2 and PO steroids    Secondary pulmonary hypertension 4/7/2015     Seizure disorder 3/19/2013    Seizures     Shingles 2013    Thyroid disease      Past Surgical History:   Procedure Laterality Date    ABDOMINAL SURGERY      CARDIAC PACEMAKER PLACEMENT       SECTION      x2    OTHER SURGICAL HISTORY      loop recorder implant    stent to fistula      VASCULAR SURGERY      fistula to L upper arm      Family History   Problem Relation Age of Onset    Kidney failure Mother     Hypertension Mother     Diabetes Mother     Coronary artery disease Father     Hypertension Father     Diabetes Father     Lupus Sister     Diabetes Maternal Grandmother     Colon cancer Neg Hx     Ovarian cancer Neg Hx     Breast cancer Neg Hx     Melanoma Neg Hx      Social History   Substance Use Topics    Smoking status: Former Smoker     Packs/day: 0.50     Years: 10.00     Types: Cigarettes     Quit date: 1994    Smokeless tobacco: Never Used    Alcohol use No      Comment: rarely     Review of Systems   Musculoskeletal: Negative for arthralgias.   Skin: Positive for wound.   Allergic/Immunologic: Negative for environmental allergies, food allergies and immunocompromised state.   Neurological: Negative for dizziness and weakness.   Hematological: Bruises/bleeds easily (on coumadin).       Physical Exam     Initial Vitals [17 2243]   BP Pulse Resp Temp SpO2   (!) 140/80 89 18 98.3 °F (36.8 °C) 100 %      MAP       100         Physical Exam    Nursing note and vitals reviewed.  Constitutional: She appears well-developed and well-nourished. She is not diaphoretic. No distress.   HENT:   Head: Normocephalic and atraumatic.   Eyes: Conjunctivae and EOM are normal. Pupils are equal, round, and reactive to light.   Neck: Normal range of motion. Neck supple.   Cardiovascular: Normal rate.   Pulmonary/Chest: No respiratory distress.   Abdominal: She exhibits no distension.   Musculoskeletal: Normal range of motion. She exhibits edema (mild pedal edema) and tenderness  (to area surrounding wound).   Neurological: She is alert and oriented to person, place, and time.   Skin: Skin is warm and dry. Capillary refill takes less than 2 seconds.   4 cm laceration to medial surface proximal to malleolus   Psychiatric: She has a normal mood and affect.         ED Course   Lac Repair  Date/Time: 7/17/2017 1:53 AM  Performed by: LIZ MIRAMONTES  Authorized by: JORGE LUIS CASILLAS   Body area: lower extremity  Location details: right ankle  Laceration length: 4 cm  Foreign bodies: no foreign bodies  Tendon involvement: none  Nerve involvement: none  Vascular damage: no  Anesthesia: local infiltration    Anesthesia:  Local Anesthetic: lidocaine 1% with epinephrine  Anesthetic total: 5 mL  Irrigation solution: saline  Irrigation method: jet lavage  Amount of cleaning: standard  Debridement: none  Degree of undermining: none  Skin closure: 4-0 Prolene  Number of sutures: 7  Technique: simple  Approximation: close  Approximation difficulty: simple  Dressing: 4x4 sterile gauze        Labs Reviewed - No data to display          Medical Decision Making:   History:   Old Medical Records: I decided to obtain old medical records.       APC / Resident Notes:   Sisi Medel is a 57 yo F with laceration to R ankle. On coumadin with INR of 2.0. Tetanus up to date. Wound closure with 4-0 prolene 7 sutures. Recommend follow up in clinic or ER in 8-10 days for suture removal. Return precautions discussed.        Scribe Attestation:   Scribe #1: I performed the above scribed service and the documentation accurately describes the services I performed. I attest to the accuracy of the note.    Attending Attestation:   Physician Attestation Statement for Resident:  As the supervising MD   Physician Attestation Statement: I have personally seen and examined this patient.   I agree with the above history. -: This is a 56 y.o. female who presents with a 4 cm laceration to the medial right leg. The  laceration is not near any tendons. Will repair the laceration and discharge.    As the supervising MD I agree with the above PE.    As the supervising MD I agree with the above treatment, course, plan, and disposition.  I was personally present during the critical portions of the procedure(s) performed by the resident and was immediately available in the ED to provide services and assistance as needed during the entire procedure.          Physician Attestation for Scribe:  Physician Attestation Statement for Scribe #1: I, Dr. Potts, reviewed documentation, as scribed by Emory Gomez in my presence, and it is both accurate and complete.                 ED Course     Clinical Impression:   The encounter diagnosis was Laceration of ankle, right, initial encounter.    Disposition:   Disposition: Discharged  Condition: Stable                        Edi Contreras MD  Resident  07/17/17 0220       Cyrus Potts MD  07/18/17 101

## 2017-07-17 NOTE — ED NOTES
Sisi Medel, a 56 y.o. female presents to the ED intake 2      Chief Complaint   Patient presents with    Laceration     right leg laceration, bleeding controlled at this time     Pt states she hit her inner RLE leg on oxygen tank at approx. 2200 last night. She states she takes warfarin and asa at home.   Pt presents from EMS and they wrapped lac with gauze and cobane.      Review of patient's allergies indicates:   Allergen Reactions    Bactrim [sulfamethoxazole-trimethoprim] Other (See Comments)     Causes renal failure    Nsaids (non-steroidal anti-inflammatory drug) Other (See Comments)     Renal failure     Past Medical History:   Diagnosis Date    Anemia in ESRD (end-stage renal disease) 3/7/2016    Anticoagulant long-term use     Cervical radiculopathy 7/20/2015    CHF (congestive heart failure)     Chronic combined systolic and diastolic heart failure 6/14/2013    EF 20% 2013, improved with Medical therapy, negative PET 2013    Chronic respiratory failure with hypoxia 04/22/2013    On home oxygen at 2-5 liters    Closed fracture of proximal end of right fibula 6/27/2016    Complications due to renal dialysis device, implant, and graft     DDD (degenerative disc disease), lumbar 6/17/2015    ESRD on hemodialysis 2/23/2016    Essential hypertension     Gout     Lateral meniscal tear 5/31/2016    Lumbar stenosis 6/17/2015    Paroxysmal atrial fibrillation 5/16/2014    Not on anticoagulation    Peripheral neuropathy 11/13/2013    Personal history of gastric ulcer 3/19/2013    Sarcoidosis, lung 2009    Diagnosed in 2009 with ocular manifestation, on 4L home O2 and PO steroids    Secondary pulmonary hypertension 4/7/2015    Seizure disorder 3/19/2013    Seizures     Shingles 11/13/2013    Thyroid disease

## 2017-07-19 ENCOUNTER — ANTI-COAG VISIT (OUTPATIENT)
Dept: CARDIOLOGY | Facility: CLINIC | Age: 57
End: 2017-07-19
Payer: MEDICARE

## 2017-07-19 DIAGNOSIS — I48.19 PERSISTENT ATRIAL FIBRILLATION: ICD-10-CM

## 2017-07-19 DIAGNOSIS — Z79.01 LONG-TERM (CURRENT) USE OF ANTICOAGULANTS: ICD-10-CM

## 2017-07-19 LAB — INR PPP: 1.4 (ref 2–3)

## 2017-07-19 PROCEDURE — 99211 OFF/OP EST MAY X REQ PHY/QHP: CPT | Mod: 25,S$GLB,,

## 2017-07-19 PROCEDURE — 85610 PROTHROMBIN TIME: CPT | Mod: QW,S$GLB,,

## 2017-07-19 NOTE — PROGRESS NOTES
"Patient unable to verify dose. States "I do not know". She states taking her coumadin daily, and following yellow card. No new medications.  She reports not eating many greens. WIll try boosted dose with INR 7/26. Monitor for INR trending down with recent dose decrease.  I have reviewed the student's initial findings and agree with their assessment. I have personally spoken with and assessed the patient in clinic to devise care plan.   "

## 2017-07-20 DIAGNOSIS — K21.00 GASTROESOPHAGEAL REFLUX DISEASE WITH ESOPHAGITIS: ICD-10-CM

## 2017-07-20 RX ORDER — OMEPRAZOLE 20 MG/1
CAPSULE, DELAYED RELEASE ORAL
Qty: 30 CAPSULE | Refills: 3 | Status: SHIPPED | OUTPATIENT
Start: 2017-07-20 | End: 2017-10-22 | Stop reason: SDUPTHER

## 2017-07-25 RX ORDER — ECONAZOLE NITRATE 10 MG/G
CREAM TOPICAL
Qty: 30 G | Refills: 0 | Status: SHIPPED | OUTPATIENT
Start: 2017-07-25 | End: 2018-03-26

## 2017-07-26 ENCOUNTER — ANTI-COAG VISIT (OUTPATIENT)
Dept: CARDIOLOGY | Facility: CLINIC | Age: 57
End: 2017-07-26
Payer: MEDICARE

## 2017-07-26 DIAGNOSIS — Z79.01 LONG-TERM (CURRENT) USE OF ANTICOAGULANTS: Primary | ICD-10-CM

## 2017-07-26 DIAGNOSIS — I48.19 PERSISTENT ATRIAL FIBRILLATION: ICD-10-CM

## 2017-07-26 LAB — INR PPP: 2.3 (ref 2–3)

## 2017-07-26 PROCEDURE — 99211 OFF/OP EST MAY X REQ PHY/QHP: CPT | Mod: 25,S$GLB,,

## 2017-07-26 PROCEDURE — 85610 PROTHROMBIN TIME: CPT | Mod: QW,S$GLB,,

## 2017-07-26 NOTE — PROGRESS NOTES
Today is follow up for subtherapeutic INR.  INR therapeutic today.  Given significant increase over past week, will not incorporate boosted dose into weekly warfarin regimen.  Continue dose and repeat INR next week with other appointment to reassess.  Patient advised to contact clinic with any changes, questions, or concerns.

## 2017-07-31 ENCOUNTER — OFFICE VISIT (OUTPATIENT)
Dept: INTERNAL MEDICINE | Facility: CLINIC | Age: 57
End: 2017-07-31
Payer: MEDICARE

## 2017-07-31 VITALS
DIASTOLIC BLOOD PRESSURE: 75 MMHG | BODY MASS INDEX: 42.03 KG/M2 | HEART RATE: 89 BPM | SYSTOLIC BLOOD PRESSURE: 108 MMHG | WEIGHT: 277.31 LBS | HEIGHT: 68 IN

## 2017-07-31 DIAGNOSIS — S91.311A TEAR OF SKIN OF PLANTAR ASPECT OF RIGHT FOOT, INITIAL ENCOUNTER: ICD-10-CM

## 2017-07-31 DIAGNOSIS — S91.011D LACERATION OF RIGHT ANKLE, SUBSEQUENT ENCOUNTER: Primary | ICD-10-CM

## 2017-07-31 PROCEDURE — 99999 PR PBB SHADOW E&M-EST. PATIENT-LVL V: CPT | Mod: PBBFAC,,, | Performed by: PHYSICIAN ASSISTANT

## 2017-07-31 PROCEDURE — 99213 OFFICE O/P EST LOW 20 MIN: CPT | Mod: S$GLB,,, | Performed by: PHYSICIAN ASSISTANT

## 2017-07-31 PROCEDURE — 99499 UNLISTED E&M SERVICE: CPT | Mod: S$GLB,,, | Performed by: PHYSICIAN ASSISTANT

## 2017-07-31 NOTE — PROGRESS NOTES
Subjective:       Patient ID: Sisi Medel is a 56 y.o. female.        Chief Complaint: Hospital Follow Up and Toe Injury (pt pulled dried skin off R-big toe )    Sisi Medel is an established patient of Carlos A Caputo MD here today for urgent care visit.    Seen in ED 7/17/17 for laceration of right ankle from oxygen tank falling on it.  7 sutures placed.  One has fallen out she reports, 6 are still in place.  The area is still somewhat painful although improved.  There has been a small amount of yellowish discharge.      Yesterday, she pulled a piece of dried skin off the bottom of her right great toe.  Since then, it has been bleeding profusely.  She has applied a pressure dressing, which has helped.      She has sarcoidosis on 4 L oxygen and also ESRD with dialysis T/R/S.  She is on Coumadin with last INR 2.3 last week.             Review of Systems   Constitutional: Negative for chills, diaphoresis, fatigue and fever.   HENT: Negative for congestion and sore throat.    Eyes: Negative for visual disturbance.   Respiratory: Negative for cough, chest tightness and shortness of breath.    Cardiovascular: Negative for chest pain, palpitations and leg swelling.   Gastrointestinal: Negative for abdominal pain, blood in stool, constipation, diarrhea, nausea and vomiting.   Genitourinary: Negative for dysuria, frequency, hematuria and urgency.   Musculoskeletal: Negative for arthralgias and back pain.   Skin: Positive for wound. Negative for rash.   Neurological: Negative for dizziness, syncope, weakness and headaches.   Psychiatric/Behavioral: Negative for dysphoric mood and sleep disturbance. The patient is not nervous/anxious.        Objective:      Physical Exam   Constitutional: She appears well-developed and well-nourished. No distress.   HENT:   Head: Normocephalic and atraumatic.   Right Ear: External ear normal.   Left Ear: External ear normal.   Neck: Neck supple.   Pulmonary/Chest:  "Effort normal.   Oxygen by NC in place   Abdominal: Soft.   Musculoskeletal:        Feet:    Skin: She is not diaphoretic.       Assessment:       1. Laceration of right ankle, subsequent encounter    2. Tear of skin of plantar aspect of right foot, initial encounter        Plan:       Sisi was seen today for hospital follow up and toe injury.    Diagnoses and all orders for this visit:    Laceration of right ankle, subsequent encounter  -     Ambulatory consult to Wound Clinic    Tear of skin of plantar aspect of right foot, initial encounter  -     Ambulatory consult to Wound Clinic  -     Ambulatory consult to Podiatry    Wound care by Arlen Car, wound care RN.  Calcium alginate applied to skin tear on toe and active bleeding controlled.  Applied 2 x 2 on top of calcium alginate and wrapped toe.  She is to leave this on until she sees podiatry tomorrow.  Mepilex dressing applied to laceration right ankle.  Again, to keep this on until seeing podiatry tomorrow for further management and evaluation.  7 sutures removed from laceration right ankle, one suture was simply lying on top of the skin and had already fallen out.  Very much appreciate Arlen's expertise.  Importance on proper foot care discussed.      Pt has been given instructions populated from [employer] database and has verbalized understanding of the after visit summary and information contained wherein.    Follow up with a primary care provider. May go to ER for acute shortness of breath, lightheadedness, fever, or any other emergent complaints or changes in condition.    "This note will be shared with the patient"    Future Appointments  Date Time Provider Department Center   8/1/2017 8:00 AM HOME MONITOR DEVICE CHECK, [facility]   8/1/2017 3:30 PM Denise Hamlin DPM [facility] POD [facility]   8/4/2017 9:30 AM Martin Martinez COUMAD [facility]   8/4/2017 10:30 AM EKG, APPT [facility] EKG [facility]   8/4/2017 11:00 AM Nani Sanders, " FNP [facility]   8/21/2017 7:00 AM JAKI Cummings NP [id number]   8/24/2017 5:30 PM BARIATRIC SURGERY, CLASS [facility] BCLASS [facility]   9/21/2017 2:00 PM Cara Chavarria MD [facility] HEARTTX [facility]

## 2017-08-01 ENCOUNTER — OFFICE VISIT (OUTPATIENT)
Dept: PODIATRY | Facility: CLINIC | Age: 57
End: 2017-08-01
Payer: MEDICARE

## 2017-08-01 ENCOUNTER — CLINICAL SUPPORT (OUTPATIENT)
Dept: ELECTROPHYSIOLOGY | Facility: CLINIC | Age: 57
End: 2017-08-01
Payer: MEDICARE

## 2017-08-01 VITALS
HEIGHT: 68 IN | DIASTOLIC BLOOD PRESSURE: 74 MMHG | WEIGHT: 277 LBS | HEART RATE: 93 BPM | SYSTOLIC BLOOD PRESSURE: 112 MMHG | BODY MASS INDEX: 41.98 KG/M2

## 2017-08-01 DIAGNOSIS — N18.6 ESRD ON HEMODIALYSIS: Chronic | ICD-10-CM

## 2017-08-01 DIAGNOSIS — I87.8 VENOUS STASIS: ICD-10-CM

## 2017-08-01 DIAGNOSIS — R60.0 BILATERAL LOWER EXTREMITY EDEMA: ICD-10-CM

## 2017-08-01 DIAGNOSIS — I48.19 PERSISTENT ATRIAL FIBRILLATION: ICD-10-CM

## 2017-08-01 DIAGNOSIS — L97.912 LEG ULCER, RIGHT, WITH FAT LAYER EXPOSED: ICD-10-CM

## 2017-08-01 DIAGNOSIS — Z99.2 ESRD ON HEMODIALYSIS: Chronic | ICD-10-CM

## 2017-08-01 DIAGNOSIS — G60.9 IDIOPATHIC PERIPHERAL NEUROPATHY: Primary | ICD-10-CM

## 2017-08-01 DIAGNOSIS — L97.512 TOE ULCER, RIGHT, WITH FAT LAYER EXPOSED: ICD-10-CM

## 2017-08-01 PROCEDURE — 99999 PR PBB SHADOW E&M-EST. PATIENT-LVL III: CPT | Mod: PBBFAC,,, | Performed by: PODIATRIST

## 2017-08-01 PROCEDURE — 11042 DBRDMT SUBQ TIS 1ST 20SQCM/<: CPT | Mod: S$GLB,,, | Performed by: PODIATRIST

## 2017-08-01 PROCEDURE — 99499 UNLISTED E&M SERVICE: CPT | Mod: S$GLB,,, | Performed by: PODIATRIST

## 2017-08-01 PROCEDURE — 99214 OFFICE O/P EST MOD 30 MIN: CPT | Mod: 25,S$GLB,, | Performed by: PODIATRIST

## 2017-08-01 PROCEDURE — 93296 REM INTERROG EVL PM/IDS: CPT | Mod: S$GLB,,, | Performed by: INTERNAL MEDICINE

## 2017-08-01 PROCEDURE — 93294 REM INTERROG EVL PM/LDLS PM: CPT | Mod: S$GLB,,, | Performed by: INTERNAL MEDICINE

## 2017-08-01 NOTE — PROGRESS NOTES
Subjective:      Patient ID: Sisi Medel is a 56 y.o. female.    Chief Complaint: Idiopathic peripheral neuropathy (bilateral rt foot) and Foot Ulcer (rt great toe)    Sisi is a 56 y.o. female who presents to the clinic for evaluation and treatment of high risk feet. Sisi has a past medical history of Anemia in ESRD (end-stage renal disease) (3/7/2016); Anticoagulant long-term use; Cervical radiculopathy (7/20/2015); CHF (congestive heart failure); Chronic combined systolic and diastolic heart failure (6/14/2013); Chronic respiratory failure with hypoxia (04/22/2013); Closed fracture of proximal end of right fibula (6/27/2016); Complications due to renal dialysis device, implant, and graft; DDD (degenerative disc disease), lumbar (6/17/2015); ESRD on hemodialysis (2/23/2016); Essential hypertension; Gout; Lateral meniscal tear (5/31/2016); Lumbar stenosis (6/17/2015); Paroxysmal atrial fibrillation (5/16/2014); Peripheral neuropathy (11/13/2013); Personal history of gastric ulcer (3/19/2013); Sarcoidosis, lung (2009); Secondary pulmonary hypertension (4/7/2015); Seizure disorder (3/19/2013); Seizures; Shingles (11/13/2013); and Thyroid disease. The patient's chief complaint is wound on the right great toe and leg. The right great toe wound started several days ago when she peeled some skin off the toe. Has been raw since then. Also has a 2 week history of laceration to R lower leg. This was stitched in the ER and stitches were removed yesterday but she was told the wound was not fully healed. She was sent to Podiatry clinic for follow up.   This patient has documented high risk feet requiring routine maintenance secondary to peripheral neuropathy.    PCP: Carlos A Caputo MD    Date Last Seen by PCP: 5/1/17  Chief Complaint   Patient presents with    Idiopathic peripheral neuropathy     bilateral rt foot    Foot Ulcer     rt great toe       Current shoe gear:  Affected Foot: Slip-on  shoes     Unaffected Foot: Slip-on shoes    Last encounter in this department: Visit date not found    Hemoglobin A1C   Date Value Ref Range Status   2016 5.7 4.5 - 6.2 % Final   10/15/2015 6.7 (H) 4.5 - 6.2 % Final   06/15/2015 5.5 4.5 - 6.2 % Final     Past Medical History:   Diagnosis Date    Anemia in ESRD (end-stage renal disease) 3/7/2016    Anticoagulant long-term use     Cervical radiculopathy 2015    CHF (congestive heart failure)     Chronic combined systolic and diastolic heart failure 2013    EF 20% , improved with Medical therapy, negative PET     Chronic respiratory failure with hypoxia 2013    On home oxygen at 2-5 liters    Closed fracture of proximal end of right fibula 2016    Complications due to renal dialysis device, implant, and graft     DDD (degenerative disc disease), lumbar 2015    ESRD on hemodialysis 2016    Essential hypertension     Gout     Lateral meniscal tear 2016    Lumbar stenosis 2015    Paroxysmal atrial fibrillation 2014    Not on anticoagulation    Peripheral neuropathy 2013    Personal history of gastric ulcer 3/19/2013    Sarcoidosis, lung 2009    Diagnosed in  with ocular manifestation, on 4L home O2 and PO steroids    Secondary pulmonary hypertension 2015    Seizure disorder 3/19/2013    Seizures     Shingles 2013    Thyroid disease        Past Surgical History:   Procedure Laterality Date    ABDOMINAL SURGERY      CARDIAC PACEMAKER PLACEMENT       SECTION      x2    OTHER SURGICAL HISTORY      loop recorder implant    stent to fistula      VASCULAR SURGERY      fistula to L upper arm        Family History   Problem Relation Age of Onset    Kidney failure Mother     Hypertension Mother     Diabetes Mother     Coronary artery disease Father     Hypertension Father     Diabetes Father     Lupus Sister     Diabetes Maternal Grandmother     Colon cancer  Neg Hx     Ovarian cancer Neg Hx     Breast cancer Neg Hx     Melanoma Neg Hx        Social History     Social History    Marital status: Single     Spouse name: N/A    Number of children: N/A    Years of education: N/A     Social History Main Topics    Smoking status: Former Smoker     Packs/day: 0.50     Years: 10.00     Types: Cigarettes     Quit date: 4/22/1994    Smokeless tobacco: Never Used    Alcohol use No      Comment: rarely    Drug use: No    Sexual activity: Not Currently     Other Topics Concern    None     Social History Narrative    Lives with boyfriend/partner who helps with her care.     Plans to travel to [place] for 2 weeks in 9/2016       Current Outpatient Prescriptions   Medication Sig Dispense Refill    ACZONE 5 % topical gel Apply to face every morning  2    ammonium lactate (LAC-HYDRIN) 12 % lotion Apply topically as needed (to legs).   2    atorvastatin (LIPITOR) 80 MG tablet TAKE 1 TABLET(80 MG) BY MOUTH EVERY DAY (Patient taking differently: TAKE 1 TABLET(80 MG) BY MOUTH EVERY NIGHT) 90 tablet 3    cholecalciferol, vitamin D3, 1,000 unit capsule Take 1 capsule (1,000 Units total) by mouth once daily. (Patient taking differently: Take 1,000 Units by mouth every evening. )  0    digoxin (LANOXIN) 125 mcg tablet Take 1 tablet (0.125 mg total) by mouth every other day. 15 tablet 1    econazole nitrate 1 % cream APPLY TOPICALLY TWICE DAILY AS NEEDED FOR FUNGAL SKIN INFECTIONS. USE FOR 2 TO 4 WEEKS 30 g 0    fludrocortisone (FLORINEF) 0.1 mg Tab Take 100 mcg by mouth 2 (two) times daily.      gabapentin (NEURONTIN) 300 MG capsule TAKE ONE CAPSULE BY MOUTH THREE TIMES DAILY 270 capsule 3    inulin-sorbitol 2 gram Chew Take 2 tablets by mouth 2 (two) times daily.  0    ketorolac 0.5% (ACULAR) 0.5 % Drop instill one drop into both eyes every morning  3    levetiracetam (KEPPRA) 500 MG Tab Take 1 tablet (500 mg total) by mouth 2 (two) times daily. 60 tablet 5    lidocaine  (LIDODERM) 5 % Place 2 patches onto the skin once daily. One on each foot. Remove & Discard patch within 12 hours or as directed by MD 90 patch 2    midodrine (PROAMATINE) 5 MG Tab Take 2 tablets (10 mg total) by mouth every Mon, Wed, Fri, Sun. 32 tablet 11    midodrine (PROAMATINE) 5 MG Tab Take 3 tablets (15 mg total) by mouth every Tues, Thurs, Sat. Take 2 tablets (10 mg total) by mouth on non dialysis days.      omeprazole (PRILOSEC) 20 MG capsule TAKE 1 CAPSULE(20 MG) BY MOUTH EVERY DAY 30 capsule 3    polyethylene glycol (GLYCOLAX) 17 gram/dose powder Take 17 g by mouth once daily. Dissolve in juice. 510 g 0    prednisoLONE acetate (PRED FORTE) 1 % DrpS INSTILL ONE DROP INTO  LEFT EYE THREE TIMES A DAY  3    predniSONE (DELTASONE) 10 MG tablet TAKE 1 TABLET BY MOUTH EVERY DAY WITH BREAKFAST 90 tablet 0    RENVELA 800 mg Tab TAKE 1 TABLET BY MOUTH THREE TIMES DAILY WITH MEALS 90 tablet 0    SENSIPAR 30 mg Tab       tizanidine 4 mg Cap TAKE 1 CAPSULE BY MOUTH DAILY AS NEEDED 60 capsule 0    warfarin (COUMADIN) 5 MG tablet Take 1 tablet (5 mg total) by mouth Daily. Per [facility] (Patient taking differently: Take 5 mg by mouth every evening. Per [facility]) 90 tablet 3    diclofenac sodium 1 % Gel Apply 2 g topically every 8 (eight) hours as needed. 100 g 0     No current facility-administered medications for this visit.        Review of patient's allergies indicates:   Allergen Reactions    Bactrim [sulfamethoxazole-trimethoprim] Other (See Comments)     Causes renal failure    Nsaids (non-steroidal anti-inflammatory drug) Other (See Comments)     Renal failure       Review of Systems   Constitution: Negative for chills and fever.   Cardiovascular: Positive for leg swelling. Negative for chest pain and claudication.   Respiratory: Negative for cough and shortness of breath.    Skin: Positive for dry skin, nail changes, poor wound healing and suspicious lesions.   Gastrointestinal: Negative  for nausea and vomiting.   Neurological: Positive for numbness and paresthesias.   Psychiatric/Behavioral: Negative for altered mental status.           Objective:      Physical Exam   Constitutional: She is oriented to person, place, and time. She appears well-developed and well-nourished.   HENT:   Head: Normocephalic.   Cardiovascular: Intact distal pulses.    Pulses:       Dorsalis pedis pulses are 1+ on the right side, and 1+ on the left side.        Posterior tibial pulses are 1+ on the right side, and 1+ on the left side.   CRT < 3 sec to tips of toes. 2+ edema noted to b/l LE. No vericosities noted to b/l LEs.      Pulmonary/Chest: No respiratory distress.   Musculoskeletal:   Gastrocnemius equinus noted to b/l ankles with decreased DF noted on exam. MMT 5/5 in DF/PF/Inv/Ev resistance with no reproduction of pain in any direction. Passive range of motion of ankle and pedal joints is painless. Adequate pedal joint ROM.      Feet:   Right Foot:   Protective Sensation: 10 sites tested. 0 sites sensed.   Skin Integrity: Positive for ulcer, skin breakdown and dry skin.   Left Foot:   Protective Sensation: 10 sites tested. 0 sites sensed.   Skin Integrity: Positive for dry skin. Negative for ulcer or skin breakdown.   Neurological: She is alert and oriented to person, place, and time. She has normal strength. A sensory deficit is present.   Light touch, proprioception, and sharp/dull sensation are all intact bilaterally. Protective threshold with the Drifting-Wienstein monofilament is completely diminished bilaterally.      Skin: Skin is warm, dry and intact. No erythema.   Generalized hyperpigmentation to ankle and lower 1/3 of R leg consistent with hemosiderin deposits. Overall skin is thin, shiny, atrophic to b/l LEs. Loss of pedal hair b/l. Toes are cool to touch b/l.     Open wound noted to plantar tip of R hallux  Measurement: 1.3 x 1.1 x 0.1cm  Border: irregular  Base: 100% granular with biofilm  Drainage:  serous only  Erythema: none  Edema: mild  Odor: none  Purulence: none    Open wound noted to medial lower leg R  Measurement: 5.5 x 0.5 x 0.2cm  Border: irregular  Base: 80/20 Granular/fibrous mix with biofilm  Drainage: serous only  Erythema: none  Edema: 2+ pitting to entire leg  Odor: none  Purulence: none     Psychiatric: She has a normal mood and affect. Her behavior is normal. Judgment and thought content normal.   Vitals reviewed.            Assessment:       Encounter Diagnoses   Name Primary?    Idiopathic peripheral neuropathy Yes    Bilateral lower extremity edema     ESRD on hemodialysis     Toe ulcer, right, with fat layer exposed     Leg ulcer, right, with fat layer exposed     Venous stasis          Plan:       Sisi was seen today for idiopathic peripheral neuropathy and foot ulcer.    Diagnoses and all orders for this visit:    Idiopathic peripheral neuropathy    Bilateral lower extremity edema    ESRD on hemodialysis    Toe ulcer, right, with fat layer exposed    Leg ulcer, right, with fat layer exposed    Venous stasis      I counseled the patient on her conditions, their implications and medical management.    Wounds assessed and examined. No signs of clinical infection at this time. Excisional debridement preformed today. See separate procedure note.     Cleansed wounds with saline, dried well. Applied iodosorb, wound foam, UNNA boot to R leg and football dressing using 2 rolls of cast padding and secured with coban.     Follow up 1 week for reassessment, sooner PRN     orders placed for change of dressing every 3 days.

## 2017-08-01 NOTE — PROCEDURES
"Wound Debridement  Date/Time: 8/1/2017 4:24 PM  Performed by: DOMI CHANG  Authorized by: DOMI CHANG     Time out: Immediately prior to procedure a "time out" was called to verify the correct patient, procedure, equipment, support staff and site/side marked as required.    Consent Done?:  Yes (Verbal)    Preparation: Patient was prepped and draped in usual sterile fashion    Local anesthesia used?: No      Wound Details:    Location:  Right foot    Location:  Right 1st Toe    Type of Debridement:  Excisional       Length (cm):  1.3       Area (sq cm):  1.3       Width (cm):  1       Percent Debrided (%):  100       Depth (cm):  0.1       Total Area Debrided (sq cm):  1.3    Depth of debridement:  Subcutaneous tissue    Tissue debrided:  Dermis, Epidermis and Subcutaneous    Devitalized tissue debrided:  Biofilm and Fibrin    Instruments:  Curette    Additional wounds:  1    2nd Wound Details:     Location:  Right leg    Type of Debridement:  Excisional       Length (cm):  5.5       Area (sq cm):  2.75       Width (cm):  0.5       Percent Debrided (%):  100       Depth (cm):  0.2       Total Area Debrided (sq cm):  2.75    Depth of debridement:  Subcutaneous tissue    Tissue debrided:  Dermis, Subcutaneous and Epidermis    Devitalized tissue debrided:  Biofilm and Fibrin    Instruments:  Curette    Bleeding:  Moderate  Hemostasis Achieved: Yes    Method Used:  Pressure  Patient tolerance:  Patient tolerated the procedure well with no immediate complications      "

## 2017-08-02 DIAGNOSIS — L97.512 TOE ULCER, RIGHT, WITH FAT LAYER EXPOSED: Primary | ICD-10-CM

## 2017-08-02 DIAGNOSIS — L97.912 LEG ULCER, RIGHT, WITH FAT LAYER EXPOSED: ICD-10-CM

## 2017-08-03 ENCOUNTER — TELEPHONE (OUTPATIENT)
Dept: ELECTROPHYSIOLOGY | Facility: CLINIC | Age: 57
End: 2017-08-03

## 2017-08-03 DIAGNOSIS — I44.2 CHB (COMPLETE HEART BLOCK): ICD-10-CM

## 2017-08-03 DIAGNOSIS — Z95.0 CARDIAC PACEMAKER IN SITU: Primary | ICD-10-CM

## 2017-08-03 DIAGNOSIS — I48.21 PERMANENT ATRIAL FIBRILLATION: ICD-10-CM

## 2017-08-04 ENCOUNTER — HOSPITAL ENCOUNTER (OUTPATIENT)
Dept: CARDIOLOGY | Facility: CLINIC | Age: 57
Discharge: HOME | End: 2017-08-04
Payer: MEDICARE

## 2017-08-04 ENCOUNTER — OFFICE VISIT (OUTPATIENT)
Dept: ELECTROPHYSIOLOGY | Facility: CLINIC | Age: 57
End: 2017-08-04
Payer: MEDICARE

## 2017-08-04 ENCOUNTER — ANTI-COAG VISIT (OUTPATIENT)
Dept: CARDIOLOGY | Facility: CLINIC | Age: 57
End: 2017-08-04
Payer: MEDICARE

## 2017-08-04 VITALS
WEIGHT: 274.25 LBS | HEIGHT: 68 IN | SYSTOLIC BLOOD PRESSURE: 100 MMHG | DIASTOLIC BLOOD PRESSURE: 70 MMHG | HEART RATE: 92 BPM | BODY MASS INDEX: 41.57 KG/M2

## 2017-08-04 DIAGNOSIS — Z79.01 CURRENT USE OF LONG TERM ANTICOAGULATION: ICD-10-CM

## 2017-08-04 DIAGNOSIS — I48.0 PAF (PAROXYSMAL ATRIAL FIBRILLATION): ICD-10-CM

## 2017-08-04 DIAGNOSIS — I50.42 CHRONIC COMBINED SYSTOLIC AND DIASTOLIC HEART FAILURE: Chronic | ICD-10-CM

## 2017-08-04 DIAGNOSIS — K21.9 GASTROESOPHAGEAL REFLUX DISEASE WITHOUT ESOPHAGITIS: ICD-10-CM

## 2017-08-04 DIAGNOSIS — E78.5 HYPERLIPIDEMIA, UNSPECIFIED HYPERLIPIDEMIA TYPE: Chronic | ICD-10-CM

## 2017-08-04 DIAGNOSIS — N18.6 ESRD ON DIALYSIS: ICD-10-CM

## 2017-08-04 DIAGNOSIS — D63.1 ANEMIA IN ESRD (END-STAGE RENAL DISEASE): Chronic | ICD-10-CM

## 2017-08-04 DIAGNOSIS — E66.01 MORBID OBESITY WITH BMI OF 40.0-44.9, ADULT: Chronic | ICD-10-CM

## 2017-08-04 DIAGNOSIS — Z99.2 ESRD ON DIALYSIS: ICD-10-CM

## 2017-08-04 DIAGNOSIS — G40.909 SEIZURE DISORDER: Chronic | ICD-10-CM

## 2017-08-04 DIAGNOSIS — R55 SYNCOPE, UNSPECIFIED SYNCOPE TYPE: ICD-10-CM

## 2017-08-04 DIAGNOSIS — N18.6 ANEMIA IN ESRD (END-STAGE RENAL DISEASE): Chronic | ICD-10-CM

## 2017-08-04 DIAGNOSIS — I48.21 PERMANENT ATRIAL FIBRILLATION: Primary | Chronic | ICD-10-CM

## 2017-08-04 DIAGNOSIS — I48.19 PERSISTENT ATRIAL FIBRILLATION: ICD-10-CM

## 2017-08-04 DIAGNOSIS — J98.4 CRLD (CHRONIC RESTRICTIVE LUNG DISEASE): ICD-10-CM

## 2017-08-04 DIAGNOSIS — Z95.0 BIVENTRICULAR CARDIAC PACEMAKER IN SITU: ICD-10-CM

## 2017-08-04 DIAGNOSIS — D86.0 PULMONARY SARCOIDOSIS: Chronic | ICD-10-CM

## 2017-08-04 DIAGNOSIS — Z79.01 LONG-TERM (CURRENT) USE OF ANTICOAGULANTS: Primary | ICD-10-CM

## 2017-08-04 LAB — INR PPP: 2 (ref 2–3)

## 2017-08-04 PROCEDURE — 99499 UNLISTED E&M SERVICE: CPT | Mod: S$GLB,,, | Performed by: NURSE PRACTITIONER

## 2017-08-04 PROCEDURE — 99211 OFF/OP EST MAY X REQ PHY/QHP: CPT | Mod: 25,S$GLB,,

## 2017-08-04 PROCEDURE — 99214 OFFICE O/P EST MOD 30 MIN: CPT | Mod: S$GLB,,, | Performed by: NURSE PRACTITIONER

## 2017-08-04 PROCEDURE — 93000 ELECTROCARDIOGRAM COMPLETE: CPT | Mod: S$GLB,,, | Performed by: INTERNAL MEDICINE

## 2017-08-04 PROCEDURE — 99999 PR PBB SHADOW E&M-EST. PATIENT-LVL III: CPT | Mod: PBBFAC,,, | Performed by: NURSE PRACTITIONER

## 2017-08-04 PROCEDURE — 85610 PROTHROMBIN TIME: CPT | Mod: QW,S$GLB,,

## 2017-08-04 NOTE — PROGRESS NOTES
"Subjective:    Patient ID:  Sisi Medel is a 56 y.o. female who presents for follow-up of Atrial Fibrillation    Sisi Medel is a patient of Dr. Adorno.    HPI     Ms. Medel is a 56 y.o. female with permanent AF s/p recent AVN RFA, HFpEF (EF 50-55%) s/p BiV PPM, pulmonary sarcoidosis/CRLD w/pHTN (on home O2 via NC), GERD, ESRD on HD w/anemia, a seizure, morbid obesity. Following HD (06/27/16), Ms. Medel became hypotensive (a common occurrence for her during/post-HD); however, on this occasion, she experienced vasovagal/orthostatic syncope. She continued to experience AF, and underwent a successful JARAD/DCCV (06/27/16) without complication. Following the procedure, Ms. Medel noted symptomatic improvement. At the time, her HD duration had been prolonged to reduce hypotension. An ILR was placed (09/28/16) to evaluate rhythm; symptomatic pAF was noted.  The decision was made for CRT-P + AVN RFA. CRT-P successfully placed (01/11/17); however, 2/2 AF, she V-paced only 4% of the time.   At her last office visit (04/28/17), Ms. Medel denied experiencing syncopal recurrence, but did report experiencing fluctuating symptoms of LH at times, as well as varying levels of "energy."  At the time, she continued to exhibit NYHA III-IV type sx.    Ms. Medel underwent a successful AVN RFA (06/12/17) without complication. Since the procedure, Ms. Medel reports experiencing continued, mild SOB/HYATT, occasional LH; her energy level remains stable. She denies chest pain, palpitations, or syncope. She is on 2-3 LPM home O2 via NC (4-5 LPM w/walking).    Recent cardiac studies:  Echo (06/26/17) revealed an EF of 50-55%, eccentric hypertrophy, LVDD, biatrial enlargement, trivial DE, and intermediate CVP.   Device Interrogation (08/01/17) reveals stable lead and device function. No ventricular arrhythmias noted. She BiV paces 97% of the time (up from 4% pre-procedure). Estimated battery longevity 4 years. "     I reviewed today's ECG which demonstrated a BiV-paced rhythm w/occasional PVCs at 92 bpm; , and QTc 526.    Review of Systems   Constitution: Positive for malaise/fatigue (intermittent; stable). Negative for diaphoresis.   HENT: Negative for headaches and nosebleeds.    Eyes: Negative for double vision.   Cardiovascular: Positive for dyspnea on exertion (occasional, mild). Negative for chest pain, irregular heartbeat, near-syncope, palpitations and syncope.   Respiratory: Positive for shortness of breath (occasional, mild).    Skin: Negative.    Musculoskeletal: Positive for muscle weakness and stiffness.   Gastrointestinal: Negative for abdominal pain, hematemesis and hematochezia.   Genitourinary: Negative for hematuria.   Neurological: Positive for light-headedness.   Psychiatric/Behavioral: Negative for altered mental status.        Objective:    Physical Exam   Constitutional: She is oriented to person, place, and time. She appears well-developed and well-nourished.   HENT:   Head: Normocephalic and atraumatic.   Eyes: Pupils are equal, round, and reactive to light.   Cardiovascular: Normal rate, normal heart sounds and intact distal pulses.    Pulmonary/Chest: Effort normal. No respiratory distress. She has decreased breath sounds in the right upper field, the right middle field, the right lower field, the left upper field, the left middle field and the left lower field. She has no wheezes. She has no rales.   Musculoskeletal:   Antalgic gait noted   Neurological: She is alert and oriented to person, place, and time.   Skin: She is not diaphoretic.   Vitals reviewed.        Assessment:       1. Permanent atrial fibrillation    2. Current use of long term anticoagulation    3. Chronic combined systolic and diastolic heart failure    4. Biventricular cardiac pacemaker in situ    5. Syncope, unspecified syncope type    6. Pulmonary sarcoidosis    7. CRLD (chronic restrictive lung disease)    8.  Secondary pulmonary hypertension    9. Hyperlipidemia, unspecified hyperlipidemia type    10. Gastroesophageal reflux disease without esophagitis    11. ESRD on dialysis    12. Anemia in ESRD (end-stage renal disease)    13. Seizure disorder    14. Morbid obesity with BMI of 40.0-44.9, adult         Plan:       In summary, Ms. Medel is a 56 y.o. female with permanent AF s/p recent AVN RFA, HFpEF (EF 50-55%) s/p BiV PPM, pulmonary sarcoidosis/CRLD w/pHTN, GERD, ESRD on HD w/anemia, a seizure, morbid obesity. Ms. Medel is doing well from a rhythm perspective s/p recent AVN RFA with stable lead and device function; 97% BiV pacing (up from 4% pre-procedure), without ventricular arrhythmia noted. She remains anticoagulated on warfarin.      Continue current medication regimen and device settings.   Follow up in device clinic as scheduled.   Follow up in EP clinic in 1 year with Echo, sooner as needed.     Nani Sanders, [place], APRN, FNP-C    Seen, interviewed, and examined. I agree with the NP's note, and the plan devised in concert with her.

## 2017-08-04 NOTE — PROGRESS NOTES
INR on low end today and decreasing on new dose. Patient reports injuring her toe 7/30. She saw PCP 7/31 since she was having difficulty stopping bleeding. She saw podiatry 8/1. No signs of infection to injury. Foot is wrapped and she has appropriate follow up. She denies any other signs of bleeding. No medication changes. She reports inconsistency with diet. This is not a new issue per past notes. She reports she eats greens when she wants. This past week she ate several green salads. Patient encouraged to be consistent with diet. We will increase dose based on INR trend and past maintenance dose being higher. Repeat INR 8/15. She thinks she will have to be here that day for another podiatry follow up. We will book INR in lab since clinic is booked out.

## 2017-08-07 DIAGNOSIS — L97.512 FOOT ULCER, RIGHT, WITH FAT LAYER EXPOSED: Primary | ICD-10-CM

## 2017-08-07 DIAGNOSIS — L97.512 TOE ULCER, RIGHT, WITH FAT LAYER EXPOSED: ICD-10-CM

## 2017-08-08 ENCOUNTER — OFFICE VISIT (OUTPATIENT)
Dept: PODIATRY | Facility: CLINIC | Age: 57
End: 2017-08-08
Payer: MEDICARE

## 2017-08-08 VITALS
BODY MASS INDEX: 41.52 KG/M2 | TEMPERATURE: 98 F | HEART RATE: 80 BPM | HEIGHT: 68 IN | SYSTOLIC BLOOD PRESSURE: 103 MMHG | WEIGHT: 274 LBS | DIASTOLIC BLOOD PRESSURE: 75 MMHG

## 2017-08-08 DIAGNOSIS — R60.0 BILATERAL LOWER EXTREMITY EDEMA: ICD-10-CM

## 2017-08-08 DIAGNOSIS — Z99.2 ESRD ON DIALYSIS: Primary | ICD-10-CM

## 2017-08-08 DIAGNOSIS — L97.512 TOE ULCER, RIGHT, WITH FAT LAYER EXPOSED: ICD-10-CM

## 2017-08-08 DIAGNOSIS — N18.6 ESRD ON DIALYSIS: Primary | ICD-10-CM

## 2017-08-08 DIAGNOSIS — L97.909 VENOUS STASIS ULCER WITH EDEMA OF LOWER LEG: ICD-10-CM

## 2017-08-08 DIAGNOSIS — I83.899 VENOUS STASIS ULCER WITH EDEMA OF LOWER LEG: ICD-10-CM

## 2017-08-08 DIAGNOSIS — I83.009 VENOUS STASIS ULCER WITH EDEMA OF LOWER LEG: ICD-10-CM

## 2017-08-08 DIAGNOSIS — R60.9 VENOUS STASIS ULCER WITH EDEMA OF LOWER LEG: ICD-10-CM

## 2017-08-08 DIAGNOSIS — G60.9 IDIOPATHIC PERIPHERAL NEUROPATHY: ICD-10-CM

## 2017-08-08 PROCEDURE — 11042 DBRDMT SUBQ TIS 1ST 20SQCM/<: CPT | Mod: S$GLB,,, | Performed by: PODIATRIST

## 2017-08-08 PROCEDURE — 99499 UNLISTED E&M SERVICE: CPT | Mod: S$GLB,,, | Performed by: PODIATRIST

## 2017-08-08 PROCEDURE — 99999 PR PBB SHADOW E&M-EST. PATIENT-LVL III: CPT | Mod: PBBFAC,,, | Performed by: PODIATRIST

## 2017-08-08 NOTE — PROGRESS NOTES
Subjective:      Patient ID: Sisi Medel is a 56 y.o. female.    Chief Complaint: Foot Ulcer (PCP Patito 4-28-17)    Sisi is a 56 y.o. female who presents to the clinic for evaluation and treatment of high risk feet. Sisi has a past medical history of Anemia in ESRD (end-stage renal disease) (3/7/2016); Anticoagulant long-term use; Cervical radiculopathy (7/20/2015); CHF (congestive heart failure); Chronic combined systolic and diastolic heart failure (6/14/2013); Chronic respiratory failure with hypoxia (04/22/2013); Closed fracture of proximal end of right fibula (6/27/2016); Complications due to renal dialysis device, implant, and graft; DDD (degenerative disc disease), lumbar (6/17/2015); ESRD on hemodialysis (2/23/2016); Essential hypertension; Gout; Lateral meniscal tear (5/31/2016); Lumbar stenosis (6/17/2015); Paroxysmal atrial fibrillation (5/16/2014); Peripheral neuropathy (11/13/2013); Personal history of gastric ulcer (3/19/2013); Sarcoidosis, lung (2009); Secondary pulmonary hypertension (4/7/2015); Seizure disorder (3/19/2013); Seizures; Shingles (11/13/2013); and Thyroid disease. The patient's chief complaint is follow up of multiple wounds on the right foot/leg.  has been changing the dressing as ordered. Pain improved.   This patient has documented high risk feet requiring routine maintenance secondary to peripheral neuropathy.    PCP: Carlos A Caputo MD    Date Last Seen by PCP: 5/1/17  Chief Complaint   Patient presents with    Foot Ulcer     JOHNNY Caputo 4-28-17       Current shoe gear:  Affected Foot: Slip-on shoes     Unaffected Foot: Slip-on shoes    Last encounter in this department: Visit date not found    Hemoglobin A1C   Date Value Ref Range Status   05/17/2016 5.7 4.5 - 6.2 % Final   10/15/2015 6.7 (H) 4.5 - 6.2 % Final   06/15/2015 5.5 4.5 - 6.2 % Final     Past Medical History:   Diagnosis Date    Anemia in ESRD (end-stage renal disease) 3/7/2016    Anticoagulant  long-term use     Cervical radiculopathy 2015    CHF (congestive heart failure)     Chronic combined systolic and diastolic heart failure 2013    EF 20% , improved with Medical therapy, negative PET     Chronic respiratory failure with hypoxia 2013    On home oxygen at 2-5 liters    Closed fracture of proximal end of right fibula 2016    Complications due to renal dialysis device, implant, and graft     DDD (degenerative disc disease), lumbar 2015    ESRD on hemodialysis 2016    Essential hypertension     Gout     Lateral meniscal tear 2016    Lumbar stenosis 2015    Paroxysmal atrial fibrillation 2014    Not on anticoagulation    Peripheral neuropathy 2013    Personal history of gastric ulcer 3/19/2013    Sarcoidosis, lung 2009    Diagnosed in  with ocular manifestation, on 4L home O2 and PO steroids    Secondary pulmonary hypertension 2015    Seizure disorder 3/19/2013    Seizures     Shingles 2013    Thyroid disease        Past Surgical History:   Procedure Laterality Date    ABDOMINAL SURGERY      CARDIAC PACEMAKER PLACEMENT       SECTION      x2    OTHER SURGICAL HISTORY      loop recorder implant    stent to fistula      VASCULAR SURGERY      fistula to L upper arm        Family History   Problem Relation Age of Onset    Kidney failure Mother     Hypertension Mother     Diabetes Mother     Coronary artery disease Father     Hypertension Father     Diabetes Father     Lupus Sister     Diabetes Maternal Grandmother     Colon cancer Neg Hx     Ovarian cancer Neg Hx     Breast cancer Neg Hx     Melanoma Neg Hx        Social History     Social History    Marital status: Single     Spouse name: N/A    Number of children: N/A    Years of education: N/A     Social History Main Topics    Smoking status: Former Smoker     Packs/day: 0.50     Years: 10.00     Types: Cigarettes     Quit date:  4/22/1994    Smokeless tobacco: Never Used    Alcohol use No      Comment: rarely    Drug use: No    Sexual activity: Not Currently     Other Topics Concern    None     Social History Narrative    Lives with boyfriend/partner who helps with her care.     Plans to travel to [place] for 2 weeks in 9/2016       Current Outpatient Prescriptions   Medication Sig Dispense Refill    ACZONE 5 % topical gel Apply to face every morning  2    ammonium lactate (LAC-HYDRIN) 12 % lotion Apply topically as needed (to legs).   2    atorvastatin (LIPITOR) 80 MG tablet TAKE 1 TABLET(80 MG) BY MOUTH EVERY DAY (Patient taking differently: TAKE 1 TABLET(80 MG) BY MOUTH EVERY NIGHT) 90 tablet 3    cholecalciferol, vitamin D3, 1,000 unit capsule Take 1 capsule (1,000 Units total) by mouth once daily. (Patient taking differently: Take 1,000 Units by mouth every evening. )  0    diclofenac sodium 1 % Gel Apply 2 g topically every 8 (eight) hours as needed. 100 g 0    digoxin (LANOXIN) 125 mcg tablet Take 1 tablet (0.125 mg total) by mouth every other day. 15 tablet 1    econazole nitrate 1 % cream APPLY TOPICALLY TWICE DAILY AS NEEDED FOR FUNGAL SKIN INFECTIONS. USE FOR 2 TO 4 WEEKS 30 g 0    fludrocortisone (FLORINEF) 0.1 mg Tab Take 100 mcg by mouth 2 (two) times daily.      gabapentin (NEURONTIN) 300 MG capsule TAKE ONE CAPSULE BY MOUTH THREE TIMES DAILY 270 capsule 3    ketorolac 0.5% (ACULAR) 0.5 % Drop instill one drop into both eyes every morning  3    levetiracetam (KEPPRA) 500 MG Tab Take 1 tablet (500 mg total) by mouth 2 (two) times daily. 60 tablet 5    midodrine (PROAMATINE) 5 MG Tab Take 2 tablets (10 mg total) by mouth every Mon, Wed, Fri, Sun. 32 tablet 11    omeprazole (PRILOSEC) 20 MG capsule TAKE 1 CAPSULE(20 MG) BY MOUTH EVERY DAY 30 capsule 3    polyethylene glycol (GLYCOLAX) 17 gram/dose powder Take 17 g by mouth once daily. Dissolve in juice. 510 g 0    prednisoLONE acetate (PRED FORTE) 1 % DrpS  INSTILL ONE DROP INTO  LEFT EYE THREE TIMES A DAY  3    predniSONE (DELTASONE) 10 MG tablet TAKE 1 TABLET BY MOUTH EVERY DAY WITH BREAKFAST 90 tablet 0    RENVELA 800 mg Tab TAKE 1 TABLET BY MOUTH THREE TIMES DAILY WITH MEALS 90 tablet 0    SENSIPAR 30 mg Tab       tizanidine 4 mg Cap TAKE 1 CAPSULE BY MOUTH DAILY AS NEEDED 60 capsule 0    warfarin (COUMADIN) 5 MG tablet Take 1 tablet (5 mg total) by mouth Daily. [facility] (Patient taking differently: Take 5 mg by mouth every evening. Per [facility]) 90 tablet 3     No current facility-administered medications for this visit.        Review of patient's allergies indicates:   Allergen Reactions    Bactrim [sulfamethoxazole-trimethoprim] Other (See Comments)     Causes renal failure    Nsaids (non-steroidal anti-inflammatory drug) Other (See Comments)     Renal failure       Review of Systems   Constitution: Negative for chills and fever.   Cardiovascular: Positive for leg swelling. Negative for chest pain and claudication.   Respiratory: Negative for cough and shortness of breath.    Skin: Positive for dry skin, nail changes, poor wound healing and suspicious lesions.   Gastrointestinal: Negative for nausea and vomiting.   Neurological: Positive for numbness and paresthesias.   Psychiatric/Behavioral: Negative for altered mental status.           Objective:      Physical Exam   Constitutional: She is oriented to person, place, and time. She appears well-developed and well-nourished.   HENT:   Head: Normocephalic.   Cardiovascular: Intact distal pulses.    Pulses:       Dorsalis pedis pulses are 1+ on the right side, and 1+ on the left side.        Posterior tibial pulses are 1+ on the right side, and 1+ on the left side.   CRT < 3 sec to tips of toes. 2+ edema noted to b/l LE. No vericosities noted to b/l LEs.      Pulmonary/Chest: No respiratory distress.   Musculoskeletal:   Gastrocnemius equinus noted to b/l ankles with decreased DF noted on  exam. MMT 5/5 in DF/PF/Inv/Ev resistance with no reproduction of pain in any direction. Passive range of motion of ankle and pedal joints is painless. Adequate pedal joint ROM.      Feet:   Right Foot:   Protective Sensation: 10 sites tested. 0 sites sensed.   Skin Integrity: Positive for ulcer, skin breakdown and dry skin.   Left Foot:   Protective Sensation: 10 sites tested. 0 sites sensed.   Skin Integrity: Positive for dry skin. Negative for ulcer or skin breakdown.   Neurological: She is alert and oriented to person, place, and time. She has normal strength. A sensory deficit is present.   Light touch, proprioception, and sharp/dull sensation are all intact bilaterally. Protective threshold with the [place]-Wienstein monofilament is completely diminished bilaterally.      Skin: Skin is warm, dry and intact. No erythema.   Generalized hyperpigmentation to ankle and lower 1/3 of R leg consistent with hemosiderin deposits. Overall skin is thin, shiny, atrophic to b/l LEs. Loss of pedal hair b/l. Toes are cool to touch b/l.     Open wound noted to plantar tip of R hallux  Measurement: 1.0 x 0.9 x 0.1cm  Border: irregular  Base: 100% granular with biofilm  Drainage: serous only  Erythema: none  Edema: mild  Odor: none  Purulence: none    Open wound noted to medial lower leg R  Measurement: 4.0 x 0.7 x 0.2cm  Border: irregular  Base: 80/20 Granular/fibrous mix with biofilm  Drainage: serous only  Erythema: none  Edema: 2+ pitting to entire leg  Odor: none  Purulence: none     Psychiatric: She has a normal mood and affect. Her behavior is normal. Judgment and thought content normal.   Vitals reviewed.            Assessment:       Encounter Diagnoses   Name Primary?    ESRD on dialysis Yes    Idiopathic peripheral neuropathy     Bilateral lower extremity edema     Venous stasis ulcer with edema of lower leg - Right Foot     Toe ulcer, right, with fat layer exposed          Plan:       Sisi was seen today for  foot ulcer.    Diagnoses and all orders for this visit:    ESRD on dialysis    Idiopathic peripheral neuropathy    Bilateral lower extremity edema    Venous stasis ulcer with edema of lower leg - Right Foot    Toe ulcer, right, with fat layer exposed      I counseled the patient on her conditions, their implications and medical management.    Wounds assessed and examined. No signs of clinical infection at this time. Excisional debridement preformed today. See separate procedure note. Improvement noted in both appearance and size of wounds.     Cleansed wounds with saline, dried well. Applied [place] to both wounds, wound foam, UNNA boot to R leg and football dressing using 2 rolls of cast padding and secured with coban.     Follow up 1 week for reassessment, sooner PRN    HH orders placed for change of dressing every 3 days.

## 2017-08-08 NOTE — PROCEDURES
"Wound Debridement  Date/Time: 8/8/2017 4:51 PM  Performed by: DOMI CHANG  Authorized by: DOMI CHANG     Time out: Immediately prior to procedure a "time out" was called to verify the correct patient, procedure, equipment, support staff and site/side marked as required.    Consent Done?:  Yes (Verbal)    Preparation: Patient was prepped and draped in usual sterile fashion    Local anesthesia used?: No      Wound Details:    Location:  Right foot    Location:  Right Ankle (Medial ankle)    Type of Debridement:  Excisional       Length (cm):  4       Area (sq cm):  2.8       Width (cm):  0.7       Percent Debrided (%):  100       Depth (cm):  0.2       Total Area Debrided (sq cm):  2.8    Depth of debridement:  Subcutaneous tissue    Tissue debrided:  Epidermis, Dermis and Subcutaneous    Devitalized tissue debrided:  Biofilm and Fibrin    Instruments:  Curette    Additional wounds:  1    2nd Wound Details:     Location:  Right foot    Location:  Right 1st Toe    Location:  Right 1st Toe    Type of Debridement:  Excisional       Length (cm):  1       Area (sq cm):  0.9       Width (cm):  0.9       Percent Debrided (%):  100       Depth (cm):  0.1       Total Area Debrided (sq cm):  0.9    Depth of debridement:  Subcutaneous tissue    Tissue debrided:  Dermis, Epidermis and Subcutaneous    Devitalized tissue debrided:  Biofilm and Callus    Instruments:  Curette    Bleeding:  Minimal  Hemostasis Achieved: Yes    Method Used:  Pressure  Patient tolerance:  Patient tolerated the procedure well with no immediate complications      "

## 2017-08-09 ENCOUNTER — DOCUMENTATION ONLY (OUTPATIENT)
Dept: ELECTROPHYSIOLOGY | Facility: CLINIC | Age: 57
End: 2017-08-09

## 2017-08-09 NOTE — PROGRESS NOTES
Patient came into the clinic on 8/4/17 for clinic visit as well as to have Base Rate on pacemaker decreased from 90 bpm to 80 bpm post AVN RFA.  Patient to return to clinic in two weeks to decrease rate to 70 bpm then 60 bpm.

## 2017-08-15 DIAGNOSIS — L97.512 FOOT ULCER, RIGHT, WITH FAT LAYER EXPOSED: ICD-10-CM

## 2017-08-15 DIAGNOSIS — L97.512 TOE ULCER, RIGHT, WITH FAT LAYER EXPOSED: Primary | ICD-10-CM

## 2017-08-17 ENCOUNTER — DOCUMENTATION ONLY (OUTPATIENT)
Dept: ELECTROPHYSIOLOGY | Facility: CLINIC | Age: 57
End: 2017-08-17

## 2017-08-17 ENCOUNTER — CLINICAL SUPPORT (OUTPATIENT)
Dept: ELECTROPHYSIOLOGY | Facility: CLINIC | Age: 57
End: 2017-08-17
Payer: MEDICARE

## 2017-08-17 ENCOUNTER — OFFICE VISIT (OUTPATIENT)
Dept: PODIATRY | Facility: CLINIC | Age: 57
End: 2017-08-17
Payer: MEDICARE

## 2017-08-17 VITALS
HEART RATE: 69 BPM | DIASTOLIC BLOOD PRESSURE: 72 MMHG | WEIGHT: 274 LBS | SYSTOLIC BLOOD PRESSURE: 98 MMHG | HEIGHT: 68 IN | BODY MASS INDEX: 41.52 KG/M2

## 2017-08-17 DIAGNOSIS — R60.0 BILATERAL LOWER EXTREMITY EDEMA: ICD-10-CM

## 2017-08-17 DIAGNOSIS — L97.319 VENOUS STASIS ULCER OF ANKLE, RIGHT: ICD-10-CM

## 2017-08-17 DIAGNOSIS — I48.21 PERMANENT ATRIAL FIBRILLATION: ICD-10-CM

## 2017-08-17 DIAGNOSIS — Z95.0 CARDIAC PACEMAKER IN SITU: ICD-10-CM

## 2017-08-17 DIAGNOSIS — I83.013 VENOUS STASIS ULCER OF ANKLE, RIGHT: ICD-10-CM

## 2017-08-17 DIAGNOSIS — L97.512 TOE ULCER, RIGHT, WITH FAT LAYER EXPOSED: ICD-10-CM

## 2017-08-17 DIAGNOSIS — N18.6 ESRD ON HEMODIALYSIS: Primary | Chronic | ICD-10-CM

## 2017-08-17 DIAGNOSIS — I44.2 CHB (COMPLETE HEART BLOCK): ICD-10-CM

## 2017-08-17 DIAGNOSIS — G60.9 IDIOPATHIC PERIPHERAL NEUROPATHY: ICD-10-CM

## 2017-08-17 DIAGNOSIS — E66.01 MORBID OBESITY WITH BMI OF 40.0-44.9, ADULT: Chronic | ICD-10-CM

## 2017-08-17 DIAGNOSIS — Z99.2 ESRD ON HEMODIALYSIS: Primary | Chronic | ICD-10-CM

## 2017-08-17 PROCEDURE — 11042 DBRDMT SUBQ TIS 1ST 20SQCM/<: CPT | Mod: S$GLB,,, | Performed by: PODIATRIST

## 2017-08-17 PROCEDURE — 99499 UNLISTED E&M SERVICE: CPT | Mod: S$GLB,,, | Performed by: PODIATRIST

## 2017-08-17 PROCEDURE — 99999 PR PBB SHADOW E&M-EST. PATIENT-LVL III: CPT | Mod: PBBFAC,,, | Performed by: PODIATRIST

## 2017-08-17 NOTE — PROGRESS NOTES
INR was due 8/15/17, Patient had rescheduled to today, Bharati Kumari with Hematology lab department called to report that sample drawn today hemolized and needs to be recollected, called Patient and left a message at 2 #'s for the Patient to call coumadin clinic,

## 2017-08-17 NOTE — PROGRESS NOTES
Subjective:      Patient ID: Sisi Medel is a 56 y.o. female.    Chief Complaint: ESRD on dialysis (lt foot)    Sisi is a 56 y.o. female who presents to the clinic for evaluation and treatment of high risk feet. Sisi has a past medical history of Anemia in ESRD (end-stage renal disease) (3/7/2016); Anticoagulant long-term use; Cervical radiculopathy (7/20/2015); CHF (congestive heart failure); Chronic combined systolic and diastolic heart failure (6/14/2013); Chronic respiratory failure with hypoxia (04/22/2013); Closed fracture of proximal end of right fibula (6/27/2016); Complications due to renal dialysis device, implant, and graft; DDD (degenerative disc disease), lumbar (6/17/2015); ESRD on hemodialysis (2/23/2016); Essential hypertension; Gout; Lateral meniscal tear (5/31/2016); Lumbar stenosis (6/17/2015); Paroxysmal atrial fibrillation (5/16/2014); Peripheral neuropathy (11/13/2013); Personal history of gastric ulcer (3/19/2013); Sarcoidosis, lung (2009); Secondary pulmonary hypertension (4/7/2015); Seizure disorder (3/19/2013); Seizures; Shingles (11/13/2013); and Thyroid disease. The patient's chief complaint is follow up of multiple wounds on the right foot/leg.  has been changing the dressing as ordered. Pain improved. Relates improvement with each dressing change. No other complaints today.  This patient has documented high risk feet requiring routine maintenance secondary to peripheral neuropathy.    PCP: Carlos A Caputo MD    Date Last Seen by PCP: 5/1/17  Chief Complaint   Patient presents with    ESRD on dialysis     lt foot       Current shoe gear:  Affected Foot: Football, UNNA boot, darco     Unaffected Foot: Slip-on shoes    Last encounter in this department: Visit date not found    Hemoglobin A1C   Date Value Ref Range Status   05/17/2016 5.7 4.5 - 6.2 % Final   10/15/2015 6.7 (H) 4.5 - 6.2 % Final   06/15/2015 5.5 4.5 - 6.2 % Final     Past Medical History:   Diagnosis  Date    Anemia in ESRD (end-stage renal disease) 3/7/2016    Anticoagulant long-term use     Cervical radiculopathy 2015    CHF (congestive heart failure)     Chronic combined systolic and diastolic heart failure 2013    EF 20% , improved with Medical therapy, negative PET     Chronic respiratory failure with hypoxia 2013    On home oxygen at 2-5 liters    Closed fracture of proximal end of right fibula 2016    Complications due to renal dialysis device, implant, and graft     DDD (degenerative disc disease), lumbar 2015    ESRD on hemodialysis 2016    Essential hypertension     Gout     Lateral meniscal tear 2016    Lumbar stenosis 2015    Paroxysmal atrial fibrillation 2014    Not on anticoagulation    Peripheral neuropathy 2013    Personal history of gastric ulcer 3/19/2013    Sarcoidosis, lung 2009    Diagnosed in  with ocular manifestation, on 4L home O2 and PO steroids    Secondary pulmonary hypertension 2015    Seizure disorder 3/19/2013    Seizures     Shingles 2013    Thyroid disease        Past Surgical History:   Procedure Laterality Date    ABDOMINAL SURGERY      CARDIAC PACEMAKER PLACEMENT       SECTION      x2    OTHER SURGICAL HISTORY      loop recorder implant    stent to fistula      VASCULAR SURGERY      fistula to L upper arm        Family History   Problem Relation Age of Onset    Kidney failure Mother     Hypertension Mother     Diabetes Mother     Coronary artery disease Father     Hypertension Father     Diabetes Father     Lupus Sister     Diabetes Maternal Grandmother     Colon cancer Neg Hx     Ovarian cancer Neg Hx     Breast cancer Neg Hx     Melanoma Neg Hx        Social History     Social History    Marital status: Single     Spouse name: N/A    Number of children: N/A    Years of education: N/A     Social History Main Topics    Smoking status: Former Smoker      Packs/day: 0.50     Years: 10.00     Types: Cigarettes     Quit date: 4/22/1994    Smokeless tobacco: Never Used    Alcohol use No      Comment: rarely    Drug use: No    Sexual activity: Not Currently     Other Topics Concern    None     Social History Narrative    Lives with boyfriend/partner who helps with her care.     Plans to travel to [place] for 2 weeks in 9/2016       Current Outpatient Prescriptions   Medication Sig Dispense Refill    ACZONE 5 % topical gel Apply to face every morning  2    ammonium lactate (LAC-HYDRIN) 12 % lotion Apply topically as needed (to legs).   2    atorvastatin (LIPITOR) 80 MG tablet TAKE 1 TABLET(80 MG) BY MOUTH EVERY DAY (Patient taking differently: TAKE 1 TABLET(80 MG) BY MOUTH EVERY NIGHT) 90 tablet 3    cholecalciferol, vitamin D3, 1,000 unit capsule Take 1 capsule (1,000 Units total) by mouth once daily. (Patient taking differently: Take 1,000 Units by mouth every evening. )  0    diclofenac sodium 1 % Gel Apply 2 g topically every 8 (eight) hours as needed. 100 g 0    digoxin (LANOXIN) 125 mcg tablet Take 1 tablet (0.125 mg total) by mouth every other day. 15 tablet 1    econazole nitrate 1 % cream APPLY TOPICALLY TWICE DAILY AS NEEDED FOR FUNGAL SKIN INFECTIONS. USE FOR 2 TO 4 WEEKS 30 g 0    fludrocortisone (FLORINEF) 0.1 mg Tab Take 100 mcg by mouth 2 (two) times daily.      gabapentin (NEURONTIN) 300 MG capsule TAKE ONE CAPSULE BY MOUTH THREE TIMES DAILY 270 capsule 3    ketorolac 0.5% (ACULAR) 0.5 % Drop instill one drop into both eyes every morning  3    levetiracetam (KEPPRA) 500 MG Tab Take 1 tablet (500 mg total) by mouth 2 (two) times daily. 60 tablet 5    midodrine (PROAMATINE) 5 MG Tab Take 2 tablets (10 mg total) by mouth every Mon, Wed, Fri, Sun. 32 tablet 11    omeprazole (PRILOSEC) 20 MG capsule TAKE 1 CAPSULE(20 MG) BY MOUTH EVERY DAY 30 capsule 3    polyethylene glycol (GLYCOLAX) 17 gram/dose powder Take 17 g by mouth once daily. Dissolve  in juice. 510 g 0    prednisoLONE acetate (PRED FORTE) 1 % DrpS INSTILL ONE DROP INTO  LEFT EYE THREE TIMES A DAY  3    predniSONE (DELTASONE) 10 MG tablet TAKE 1 TABLET BY MOUTH EVERY DAY WITH BREAKFAST 90 tablet 0    RENVELA 800 mg Tab TAKE 1 TABLET BY MOUTH THREE TIMES DAILY WITH MEALS 90 tablet 0    SENSIPAR 30 mg Tab       tizanidine 4 mg Cap TAKE 1 CAPSULE BY MOUTH DAILY AS NEEDED 60 capsule 0    warfarin (COUMADIN) 5 MG tablet Take 1 tablet (5 mg total) by mouth Daily. [facility] (Patient taking differently: Take 5 mg by mouth every evening. Per [facility]) 90 tablet 3     No current facility-administered medications for this visit.        Review of patient's allergies indicates:   Allergen Reactions    Bactrim [sulfamethoxazole-trimethoprim] Other (See Comments)     Causes renal failure    Nsaids (non-steroidal anti-inflammatory drug) Other (See Comments)     Renal failure       Review of Systems   Constitution: Negative for chills and fever.   Cardiovascular: Positive for leg swelling. Negative for chest pain and claudication.   Respiratory: Negative for cough and shortness of breath.    Skin: Positive for dry skin, nail changes, poor wound healing and suspicious lesions.   Gastrointestinal: Negative for nausea and vomiting.   Neurological: Positive for numbness and paresthesias.   Psychiatric/Behavioral: Negative for altered mental status.           Objective:      Physical Exam   Constitutional: She is oriented to person, place, and time. She appears well-developed and well-nourished.   HENT:   Head: Normocephalic.   Cardiovascular: Intact distal pulses.    Pulses:       Dorsalis pedis pulses are 1+ on the right side, and 1+ on the left side.        Posterior tibial pulses are 1+ on the right side, and 1+ on the left side.   CRT < 3 sec to tips of toes. 2+ edema noted to b/l LE. No vericosities noted to b/l LEs.      Pulmonary/Chest: No respiratory distress.   Musculoskeletal:    Gastrocnemius equinus noted to b/l ankles with decreased DF noted on exam. MMT 5/5 in DF/PF/Inv/Ev resistance with no reproduction of pain in any direction. Passive range of motion of ankle and pedal joints is painless. Adequate pedal joint ROM.      Feet:   Right Foot:   Protective Sensation: 10 sites tested. 0 sites sensed.   Skin Integrity: Positive for ulcer, skin breakdown and dry skin.   Left Foot:   Protective Sensation: 10 sites tested. 0 sites sensed.   Skin Integrity: Positive for dry skin. Negative for ulcer or skin breakdown.   Neurological: She is alert and oriented to person, place, and time. She has normal strength. A sensory deficit is present.   Light touch, proprioception, and sharp/dull sensation are all intact bilaterally. Protective threshold with the [place]-Wienstein monofilament is completely diminished bilaterally.      Skin: Skin is warm, dry and intact. No erythema.   Generalized hyperpigmentation to ankle and lower 1/3 of R leg consistent with hemosiderin deposits. Overall skin is thin, shiny, atrophic to b/l LEs. Loss of pedal hair b/l. Toes are cool to touch b/l.     Open wound noted to plantar tip of R hallux  Measurement:0.8 x 0.5 x 0.1cm  Border: irregular  Base: 100% granular with biofilm  Drainage: serous only  Erythema: none  Edema: none   Odor: none  Purulence: none    Open wound noted to medial lower leg R  Measurement: 3.4 x 0.6 x 0.2cm  Border: irregular  Base: 80/20 Granular/fibrous mix with biofilm  Drainage: serous only  Erythema: none  Edema: 2+ pitting to entire leg  Odor: none  Purulence: none     Psychiatric: She has a normal mood and affect. Her behavior is normal. Judgment and thought content normal.   Vitals reviewed.            Assessment:       Encounter Diagnoses   Name Primary?    ESRD on hemodialysis Yes    Morbid obesity with BMI of 40.0-44.9, adult     Idiopathic peripheral neuropathy     Bilateral lower extremity edema     Toe ulcer, right, with fat  layer exposed     Venous stasis ulcer of ankle, right          Plan:       Sisi was seen today for esrd on dialysis.    Diagnoses and all orders for this visit:    ESRD on hemodialysis    Morbid obesity with BMI of 40.0-44.9, adult    Idiopathic peripheral neuropathy    Bilateral lower extremity edema    Toe ulcer, right, with fat layer exposed  -     Wound care routine (specify)  -     Debridement    Venous stasis ulcer of ankle, right  -     Wound care routine (specify)  -     Debridement      I counseled the patient on her conditions, their implications and medical management.    Wounds assessed and examined. No signs of clinical infection at this time. Excisional debridement preformed today. See separate procedure note. Improvement noted in both appearance and size of wounds.     Cleansed wounds with saline, dried well. Applied [place] to both wounds, wound foam, UNNA boot to R leg and football dressing using 2 rolls of cast padding and secured with coban. Toe ball for great toe wound. darco for ambulation daily.     Follow up 2 weeks for reassessment, sooner PRN    HH orders placed for change of dressing once next week.

## 2017-08-17 NOTE — PROGRESS NOTES
Patient came into the clinic today for Base Rate on pacemaker decreased from 80 bpm to 70 bpm post AVN RFA.  Patient to return to clinic in two weeks to decrease rate to 70 bpm then 60 bpm.    Patient stated she has been feeling well.  She also stated she felt good enough to walk up to the clinic following Dialysis on today.

## 2017-08-17 NOTE — PROCEDURES
"Wound Debridement  Date/Time: 8/17/2017 4:29 PM  Performed by: DOMI CHANG  Authorized by: DOMI CHANG     Time out: Immediately prior to procedure a "time out" was called to verify the correct patient, procedure, equipment, support staff and site/side marked as required.    Consent Done?:  Yes (Verbal)    Preparation: Patient was prepped and draped in usual sterile fashion    Local anesthesia used?: No      Wound Details:    Location:  Right foot    Location:  Right 1st Toe    Type of Debridement:  Excisional       Length (cm):  0.8       Area (sq cm):  0.4       Width (cm):  0.5       Percent Debrided (%):  100       Depth (cm):  0.1       Total Area Debrided (sq cm):  0.4    Depth of debridement:  Subcutaneous tissue    Tissue debrided:  Dermis, Subcutaneous and Epidermis    Devitalized tissue debrided:  Biofilm and Callus    Instruments:  Curette    Additional wounds:  1    2nd Wound Details:     Location:  Right leg (Medial)    Type of Debridement:  Excisional       Length (cm):  3.4       Area (sq cm):  2.04       Width (cm):  0.6       Percent Debrided (%):  100       Depth (cm):  0.2       Total Area Debrided (sq cm):  2.04    Depth of debridement:  Subcutaneous tissue    Tissue debrided:  Dermis, Epidermis and Subcutaneous    Devitalized tissue debrided:  Biofilm, Fibrin and Sough    Instruments:  Curette    Bleeding:  Minimal  Hemostasis Achieved: Yes    Method Used:  Pressure  Patient tolerance:  Patient tolerated the procedure well with no immediate complications      "

## 2017-08-21 ENCOUNTER — ANTI-COAG VISIT (OUTPATIENT)
Dept: CARDIOLOGY | Facility: CLINIC | Age: 57
End: 2017-08-21

## 2017-08-21 ENCOUNTER — OFFICE VISIT (OUTPATIENT)
Dept: OBSTETRICS AND GYNECOLOGY | Facility: CLINIC | Age: 57
End: 2017-08-21
Payer: MEDICARE

## 2017-08-21 ENCOUNTER — LAB VISIT (OUTPATIENT)
Dept: LAB | Facility: OTHER | Age: 57
End: 2017-08-21
Attending: INTERNAL MEDICINE
Payer: MEDICARE

## 2017-08-21 VITALS
SYSTOLIC BLOOD PRESSURE: 104 MMHG | HEIGHT: 68 IN | DIASTOLIC BLOOD PRESSURE: 70 MMHG | WEIGHT: 275 LBS | BODY MASS INDEX: 41.68 KG/M2

## 2017-08-21 DIAGNOSIS — Z78.0 POSTMENOPAUSE: ICD-10-CM

## 2017-08-21 DIAGNOSIS — L98.9 NODULAR LESION ON SURFACE OF SKIN: ICD-10-CM

## 2017-08-21 DIAGNOSIS — Z12.39 BREAST CANCER SCREENING: ICD-10-CM

## 2017-08-21 DIAGNOSIS — Z01.419 VISIT FOR GYNECOLOGIC EXAMINATION: Primary | ICD-10-CM

## 2017-08-21 DIAGNOSIS — Z79.01 LONG-TERM (CURRENT) USE OF ANTICOAGULANTS: ICD-10-CM

## 2017-08-21 DIAGNOSIS — I48.0 PAROXYSMAL ATRIAL FIBRILLATION: ICD-10-CM

## 2017-08-21 DIAGNOSIS — Z79.01 CURRENT USE OF LONG TERM ANTICOAGULATION: ICD-10-CM

## 2017-08-21 LAB
INR PPP: 1.9
PROTHROMBIN TIME: 21.2 SEC

## 2017-08-21 PROCEDURE — G0101 CA SCREEN;PELVIC/BREAST EXAM: HCPCS | Mod: S$GLB,,, | Performed by: NURSE PRACTITIONER

## 2017-08-21 PROCEDURE — 99999 PR PBB SHADOW E&M-EST. PATIENT-LVL III: CPT | Mod: PBBFAC,,, | Performed by: NURSE PRACTITIONER

## 2017-08-21 PROCEDURE — 85610 PROTHROMBIN TIME: CPT

## 2017-08-21 PROCEDURE — 36415 COLL VENOUS BLD VENIPUNCTURE: CPT

## 2017-08-21 NOTE — PROGRESS NOTES
"HISTORY OF PRESENT ILLNESS:    Sisi Medel is a 56 y.o. female , presents for a pre-kidney transplant routine exam and has no gyn complaints.   -Is fatigued due to neighbor's dog barking all night.   -Also sob due to "change in her meds".     Past Medical History:   Diagnosis Date    Anemia in ESRD (end-stage renal disease) 3/7/2016    Anticoagulant long-term use     Cervical radiculopathy 2015    CHF (congestive heart failure)     Chronic combined systolic and diastolic heart failure 2013    EF 20% , improved with Medical therapy, negative PET     Chronic respiratory failure with hypoxia 2013    On home oxygen at 2-5 liters    Closed fracture of proximal end of right fibula 2016    Complications due to renal dialysis device, implant, and graft     DDD (degenerative disc disease), lumbar 2015    ESRD on hemodialysis 2016    Essential hypertension     Gout     Lateral meniscal tear 2016    Lumbar stenosis 2015    Paroxysmal atrial fibrillation 2014    Not on anticoagulation    Peripheral neuropathy 2013    Personal history of gastric ulcer 3/19/2013    Sarcoidosis, lung 2009    Diagnosed in  with ocular manifestation, on 4L home O2 and PO steroids    Secondary pulmonary hypertension 2015    Seizure disorder 3/19/2013    Seizures     Shingles 2013    Thyroid disease        Past Surgical History:   Procedure Laterality Date    ABDOMINAL SURGERY      CARDIAC PACEMAKER PLACEMENT       SECTION      x2    OTHER SURGICAL HISTORY      loop recorder implant    stent to fistula      VASCULAR SURGERY      fistula to L upper arm         MEDICATIONS AND ALLERGIES:      Current Outpatient Prescriptions:     ACZONE 5 % topical gel, Apply to face every morning, Disp: , Rfl: 2    ammonium lactate (LAC-HYDRIN) 12 % lotion, Apply topically as needed (to legs). , Disp: , Rfl: 2    atorvastatin (LIPITOR) 80 MG " tablet, TAKE 1 TABLET(80 MG) BY MOUTH EVERY DAY (Patient taking differently: TAKE 1 TABLET(80 MG) BY MOUTH EVERY NIGHT), Disp: 90 tablet, Rfl: 3    cholecalciferol, vitamin D3, 1,000 unit capsule, Take 1 capsule (1,000 Units total) by mouth once daily. (Patient taking differently: Take 1,000 Units by mouth every evening. ), Disp: , Rfl: 0    diclofenac sodium 1 % Gel, Apply 2 g topically every 8 (eight) hours as needed., Disp: 100 g, Rfl: 0    digoxin (LANOXIN) 125 mcg tablet, Take 1 tablet (0.125 mg total) by mouth every other day., Disp: 15 tablet, Rfl: 1    econazole nitrate 1 % cream, APPLY TOPICALLY TWICE DAILY AS NEEDED FOR FUNGAL SKIN INFECTIONS. USE FOR 2 TO 4 WEEKS, Disp: 30 g, Rfl: 0    fludrocortisone (FLORINEF) 0.1 mg Tab, Take 100 mcg by mouth 2 (two) times daily., Disp: , Rfl:     gabapentin (NEURONTIN) 300 MG capsule, TAKE ONE CAPSULE BY MOUTH THREE TIMES DAILY, Disp: 270 capsule, Rfl: 3    ketorolac 0.5% (ACULAR) 0.5 % Drop, instill one drop into both eyes every morning, Disp: , Rfl: 3    levetiracetam (KEPPRA) 500 MG Tab, Take 1 tablet (500 mg total) by mouth 2 (two) times daily., Disp: 60 tablet, Rfl: 5    midodrine (PROAMATINE) 5 MG Tab, Take 2 tablets (10 mg total) by mouth every Mon, Wed, Fri, Sun., Disp: 32 tablet, Rfl: 11    omeprazole (PRILOSEC) 20 MG capsule, TAKE 1 CAPSULE(20 MG) BY MOUTH EVERY DAY, Disp: 30 capsule, Rfl: 3    polyethylene glycol (GLYCOLAX) 17 gram/dose powder, Take 17 g by mouth once daily. Dissolve in juice., Disp: 510 g, Rfl: 0    prednisoLONE acetate (PRED FORTE) 1 % DrpS, INSTILL ONE DROP INTO  LEFT EYE THREE TIMES A DAY, Disp: , Rfl: 3    predniSONE (DELTASONE) 10 MG tablet, TAKE 1 TABLET BY MOUTH EVERY DAY WITH BREAKFAST, Disp: 90 tablet, Rfl: 0    RENVELA 800 mg Tab, TAKE 1 TABLET BY MOUTH THREE TIMES DAILY WITH MEALS, Disp: 90 tablet, Rfl: 0    SENSIPAR 30 mg Tab, , Disp: , Rfl:     tizanidine 4 mg Cap, TAKE 1 CAPSULE BY MOUTH DAILY AS NEEDED, Disp:  60 capsule, Rfl: 0    warfarin (COUMADIN) 5 MG tablet, Take 1 tablet (5 mg total) by mouth Daily. [facility] (Patient taking differently: Take 5 mg by mouth every evening. [facility]), Disp: 90 tablet, Rfl: 3    Review of patient's allergies indicates:   Allergen Reactions    Bactrim [sulfamethoxazole-trimethoprim] Other (See Comments)     Causes renal failure    Nsaids (non-steroidal anti-inflammatory drug) Other (See Comments)     Renal failure       Family History   Problem Relation Age of Onset    Kidney failure Mother     Hypertension Mother     Diabetes Mother     Coronary artery disease Father     Hypertension Father     Diabetes Father     Lupus Sister     Diabetes Maternal Grandmother     Colon cancer Neg Hx     Ovarian cancer Neg Hx     Breast cancer Neg Hx        Social History     Social History    Marital status: Single     Spouse name: N/A    Number of children: N/A    Years of education: N/A     Occupational History    Not on file.     Social History Main Topics    Smoking status: Former Smoker     Packs/day: 0.50     Years: 10.00     Types: Cigarettes     Quit date: 4/22/1994    Smokeless tobacco: Never Used    Alcohol use No      Comment: rarely    Drug use: No    Sexual activity: Not Currently     Other Topics Concern    Not on file     Social History Narrative    Lives with boyfriend/partner who helps with her care.            OBSTETRIC HISTORY: Number of vaginal deliveries: 1 and Number of cesareans: 1    COMPREHENSIVE GYN HISTORY:  PAP History: Denies abnormal Paps. LAST PAP 8-16-16 NORMAL.   Infection History: Denies STDs. Denies PID.  Benign History: Denies uterine fibroids. Denies ovarian cysts. Denies endometriosis. Denies other conditions.  Cancer History: Denies cervical cancer. Denies uterine cancer or hyperplasia. Denies ovarian cancer. Denies vulvar cancer or pre-cancer. Denies vaginal cancer or precancer.  Denies breast cancer. Denies colon  "cancer.  Sexual Activity History: Denies currently being sexually active.  Menstrual History: Denies menses. Pt is  not on HRT.    ROS:  GENERAL: + WT GAIN, SWELLING. + FATIGUE. No fever.  CARDIOVASCULAR: No chest pain. + SOB, SEE HPI. No leg cramps.   NEUROLOGICAL: No headaches. No vision changes.  BREASTS: No pain. No lumps. No discharge.  ABDOMEN: No pain. No nausea. No vomiting. No diarrhea. No constipation.  REPRODUCTIVE: No abnormal bleeding.   VULVA: No pain. No lesions. No itching.  VAGINA: No relaxation. No itching. No odor. No discharge. No lesions.  URINARY: No incontinence. No nocturia. No frequency. No dysuria.    /70   Ht 5' 8" (1.727 m)   Wt 124.7 kg (275 lb)   LMP  (LMP Unknown)   BMI 41.81 kg/m²     PE:  APPEARANCE: Well nourished, well developed, in no acute distress. WALKS WITH WALKER.  AFFECT: WNL, alert and oriented x 3.  SKIN: No hirsutism or acne.  NECK: Neck symmetric without masses or thyromegaly.  NODES: No inguinal, cervical, axillary or femoral lymph node enlargement.  CHEST: APPEARS SOB, ON PORTABLE O2, RR 16.   ABDOMEN: Soft. No tenderness or masses. There are TWO HYPERPIGMENTED IRREGULAR RAISED NODULAR LESIONS UNDER HER PANNUS ? CONDYLOMA.   BREASTS: Symmetrical, no skin changes or visible lesions. No palpable masses, nipple discharge bilaterally.  PELVIC: ATROPHIC EXTERNAL FEMALE GENITALIA without lesions. Normal hair distribution. Adequate perineal body, normal urethral meatus. VAGINA DRY / ATROPHIC without lesions or discharge. CERVIX STENOTIC without lesions, discharge or tenderness. No significant cystocele or rectocele. Bimanual exam shows uterus to be normal size, regular, mobile and nontender. Adnexa without masses or tenderness. EXAM DIFFICULT DUE TO BODY HABITUS.  RECTAL: Rectovaginal exam confirms above with normal sphincter tone, no masses.  EXTREMITIES: No edema.    DIAGNOSIS:  1. Visit for gynecologic examination    2. Postmenopause    3. Breast cancer " screening    4. Nodular lesions on surface of skin        PLAN:    Orders Placed This Encounter    Mammo Digital Screening Bilat with Tomosynthesis_CAD    Ambulatory consult to Dermatology   Up to date on BMD  Needs Colonoscopy    COUNSELING:  The patient was counseled today on:  -A.C.S. Pap and pelvic exam guidelines (pap every 3 years, no pap after age 65) and recommendations for yearly mammogram;  -to see her PCP for other health maintenance and follow up of sob.    FOLLOW-UP with me annually.

## 2017-08-22 DIAGNOSIS — D86.0 SARCOIDOSIS OF LUNG: ICD-10-CM

## 2017-08-23 RX ORDER — PREDNISONE 10 MG/1
TABLET ORAL
Qty: 90 TABLET | Refills: 0 | Status: SHIPPED | OUTPATIENT
Start: 2017-08-23 | End: 2017-11-15 | Stop reason: SDUPTHER

## 2017-08-25 ENCOUNTER — TELEPHONE (OUTPATIENT)
Dept: VASCULAR SURGERY | Facility: CLINIC | Age: 57
End: 2017-08-25

## 2017-08-25 ENCOUNTER — HOSPITAL ENCOUNTER (OUTPATIENT)
Dept: RADIOLOGY | Facility: HOSPITAL | Age: 57
Discharge: HOME | End: 2017-08-25
Attending: NURSE PRACTITIONER
Payer: MEDICARE

## 2017-08-25 DIAGNOSIS — M62.838 MUSCLE SPASM: ICD-10-CM

## 2017-08-25 DIAGNOSIS — Z12.39 BREAST CANCER SCREENING: ICD-10-CM

## 2017-08-25 PROCEDURE — 77067 SCR MAMMO BI INCL CAD: CPT | Mod: TC

## 2017-08-25 PROCEDURE — 77067 SCR MAMMO BI INCL CAD: CPT | Mod: 26,,, | Performed by: RADIOLOGY

## 2017-08-25 RX ORDER — TIZANIDINE HYDROCHLORIDE 4 MG/1
1 CAPSULE, GELATIN COATED ORAL DAILY PRN
Qty: 60 CAPSULE | Refills: 0 | Status: SHIPPED | OUTPATIENT
Start: 2017-08-25 | End: 2017-10-22 | Stop reason: SDUPTHER

## 2017-08-25 NOTE — TELEPHONE ENCOUNTER
----- Message from Travis Porter sent at 8/25/2017  1:24 PM CDT -----  Pt needs to schedule appt from recall. Please call pt at [phone]

## 2017-08-31 ENCOUNTER — OFFICE VISIT (OUTPATIENT)
Dept: PODIATRY | Facility: CLINIC | Age: 57
End: 2017-08-31
Payer: MEDICARE

## 2017-08-31 VITALS
BODY MASS INDEX: 41.68 KG/M2 | DIASTOLIC BLOOD PRESSURE: 65 MMHG | WEIGHT: 275 LBS | HEART RATE: 70 BPM | HEIGHT: 68 IN | SYSTOLIC BLOOD PRESSURE: 102 MMHG

## 2017-08-31 DIAGNOSIS — N18.6 ESRD ON HEMODIALYSIS: Primary | Chronic | ICD-10-CM

## 2017-08-31 DIAGNOSIS — G60.9 IDIOPATHIC PERIPHERAL NEUROPATHY: ICD-10-CM

## 2017-08-31 DIAGNOSIS — L97.912 LEG ULCER, RIGHT, WITH FAT LAYER EXPOSED: ICD-10-CM

## 2017-08-31 DIAGNOSIS — Z87.2 HEALED ULCER OF RIGHT FOOT ON EXAMINATION: ICD-10-CM

## 2017-08-31 DIAGNOSIS — E66.01 MORBID OBESITY WITH BMI OF 40.0-44.9, ADULT: Chronic | ICD-10-CM

## 2017-08-31 DIAGNOSIS — R60.0 BILATERAL LOWER EXTREMITY EDEMA: ICD-10-CM

## 2017-08-31 DIAGNOSIS — Z99.2 ESRD ON HEMODIALYSIS: Primary | Chronic | ICD-10-CM

## 2017-08-31 PROCEDURE — 3008F BODY MASS INDEX DOCD: CPT | Mod: S$GLB,,, | Performed by: PODIATRIST

## 2017-08-31 PROCEDURE — 99213 OFFICE O/P EST LOW 20 MIN: CPT | Mod: 25,S$GLB,, | Performed by: PODIATRIST

## 2017-08-31 PROCEDURE — 99499 UNLISTED E&M SERVICE: CPT | Mod: S$GLB,,, | Performed by: PODIATRIST

## 2017-08-31 PROCEDURE — 11042 DBRDMT SUBQ TIS 1ST 20SQCM/<: CPT | Mod: S$GLB,,, | Performed by: PODIATRIST

## 2017-08-31 PROCEDURE — 99999 PR PBB SHADOW E&M-EST. PATIENT-LVL II: CPT | Mod: PBBFAC,,, | Performed by: PODIATRIST

## 2017-08-31 NOTE — PROGRESS NOTES
Subjective:      Patient ID: Sisi Medel is a 56 y.o. female.    Chief Complaint: No chief complaint on file.    Sisi is a 56 y.o. female who presents to the clinic for evaluation and treatment of high risk feet. Sisi has a past medical history of Anemia in ESRD (end-stage renal disease) (3/7/2016); Anticoagulant long-term use; Cervical radiculopathy (7/20/2015); CHF (congestive heart failure); Chronic combined systolic and diastolic heart failure (6/14/2013); Chronic respiratory failure with hypoxia (04/22/2013); Closed fracture of proximal end of right fibula (6/27/2016); Complications due to renal dialysis device, implant, and graft; DDD (degenerative disc disease), lumbar (6/17/2015); ESRD on hemodialysis (2/23/2016); Essential hypertension; Gout; Lateral meniscal tear (5/31/2016); Lumbar stenosis (6/17/2015); Paroxysmal atrial fibrillation (5/16/2014); Peripheral neuropathy (11/13/2013); Personal history of gastric ulcer (3/19/2013); Sarcoidosis, lung (2009); Secondary pulmonary hypertension (4/7/2015); Seizure disorder (3/19/2013); Seizures; Shingles (11/13/2013); and Thyroid disease. The patient's chief complaint is follow up of multiple wounds on the right foot/leg.  has been changing the dressing as ordered. Pain resolved. Relates improvement with each dressing change. No other complaints today.  This patient has documented high risk feet requiring routine maintenance secondary to peripheral neuropathy.    PCP: Carlos A Caputo MD    Date Last Seen by PCP: 5/1/17      Current shoe gear:  Affected Foot: Football, UNNA boot, darco     Unaffected Foot: Slip-on shoes    Last encounter in this department: Visit date not found    Hemoglobin A1C   Date Value Ref Range Status   05/17/2016 5.7 4.5 - 6.2 % Final   10/15/2015 6.7 (H) 4.5 - 6.2 % Final   06/15/2015 5.5 4.5 - 6.2 % Final     Past Medical History:   Diagnosis Date    Anemia in ESRD (end-stage renal disease) 3/7/2016    Anticoagulant  long-term use     Cervical radiculopathy 2015    CHF (congestive heart failure)     Chronic combined systolic and diastolic heart failure 2013    EF 20% , improved with Medical therapy, negative PET     Chronic respiratory failure with hypoxia 2013    On home oxygen at 2-5 liters    Closed fracture of proximal end of right fibula 2016    Complications due to renal dialysis device, implant, and graft     DDD (degenerative disc disease), lumbar 2015    ESRD on hemodialysis 2016    Essential hypertension     Gout     Lateral meniscal tear 2016    Lumbar stenosis 2015    Paroxysmal atrial fibrillation 2014    Not on anticoagulation    Peripheral neuropathy 2013    Personal history of gastric ulcer 3/19/2013    Sarcoidosis, lung 2009    Diagnosed in  with ocular manifestation, on 4L home O2 and PO steroids    Secondary pulmonary hypertension 2015    Seizure disorder 3/19/2013    Seizures     Shingles 2013    Thyroid disease        Past Surgical History:   Procedure Laterality Date    ABDOMINAL SURGERY      CARDIAC PACEMAKER PLACEMENT       SECTION      x2    OTHER SURGICAL HISTORY      loop recorder implant    stent to fistula      VASCULAR SURGERY      fistula to L upper arm        Family History   Problem Relation Age of Onset    Kidney failure Mother     Hypertension Mother     Diabetes Mother     Coronary artery disease Father     Hypertension Father     Diabetes Father     Lupus Sister     Diabetes Maternal Grandmother     Colon cancer Neg Hx     Ovarian cancer Neg Hx     Breast cancer Neg Hx        Social History     Social History    Marital status: Single     Spouse name: N/A    Number of children: N/A    Years of education: N/A     Social History Main Topics    Smoking status: Former Smoker     Packs/day: 0.50     Years: 10.00     Types: Cigarettes     Quit date: 1994    Smokeless  tobacco: Never Used    Alcohol use No      Comment: rarely    Drug use: No    Sexual activity: Not Currently     Other Topics Concern    None     Social History Narrative    Lives with boyfriend/partner who helps with her care.            Current Outpatient Prescriptions   Medication Sig Dispense Refill    ACZONE 5 % topical gel Apply to face every morning  2    ammonium lactate (LAC-HYDRIN) 12 % lotion Apply topically as needed (to legs).   2    atorvastatin (LIPITOR) 80 MG tablet TAKE 1 TABLET(80 MG) BY MOUTH EVERY DAY (Patient taking differently: TAKE 1 TABLET(80 MG) BY MOUTH EVERY NIGHT) 90 tablet 3    cholecalciferol, vitamin D3, 1,000 unit capsule Take 1 capsule (1,000 Units total) by mouth once daily. (Patient taking differently: Take 1,000 Units by mouth every evening. )  0    digoxin (LANOXIN) 125 mcg tablet Take 1 tablet (0.125 mg total) by mouth every other day. 15 tablet 1    econazole nitrate 1 % cream APPLY TOPICALLY TWICE DAILY AS NEEDED FOR FUNGAL SKIN INFECTIONS. USE FOR 2 TO 4 WEEKS 30 g 0    fludrocortisone (FLORINEF) 0.1 mg Tab Take 100 mcg by mouth 2 (two) times daily.      gabapentin (NEURONTIN) 300 MG capsule TAKE ONE CAPSULE BY MOUTH THREE TIMES DAILY 270 capsule 3    ketorolac 0.5% (ACULAR) 0.5 % Drop instill one drop into both eyes every morning  3    levetiracetam (KEPPRA) 500 MG Tab Take 1 tablet (500 mg total) by mouth 2 (two) times daily. 60 tablet 5    midodrine (PROAMATINE) 5 MG Tab Take 2 tablets (10 mg total) by mouth every Mon, Wed, Fri, Sun. 32 tablet 11    omeprazole (PRILOSEC) 20 MG capsule TAKE 1 CAPSULE(20 MG) BY MOUTH EVERY DAY 30 capsule 3    polyethylene glycol (GLYCOLAX) 17 gram/dose powder Take 17 g by mouth once daily. Dissolve in juice. 510 g 0    prednisoLONE acetate (PRED FORTE) 1 % DrpS INSTILL ONE DROP INTO  LEFT EYE THREE TIMES A DAY  3    predniSONE (DELTASONE) 10 MG tablet TAKE 1 TABLET BY MOUTH EVERY DAY WITH BREAKFAST 90 tablet 0    RENVELA  800 mg Tab TAKE 1 TABLET BY MOUTH THREE TIMES DAILY WITH MEALS 90 tablet 0    SENSIPAR 30 mg Tab       tizanidine 4 mg Cap TAKE 1 CAPSULE BY MOUTH DAILY AS NEEDED 60 capsule 0    warfarin (COUMADIN) 5 MG tablet Take 1 tablet (5 mg total) by mouth Daily. [facility] (Patient taking differently: Take 5 mg by mouth every evening. [facility]) 90 tablet 3    diclofenac sodium 1 % Gel Apply 2 g topically every 8 (eight) hours as needed. 100 g 0     No current facility-administered medications for this visit.        Review of patient's allergies indicates:   Allergen Reactions    Bactrim [sulfamethoxazole-trimethoprim] Other (See Comments)     Causes renal failure    Nsaids (non-steroidal anti-inflammatory drug) Other (See Comments)     Renal failure       Review of Systems   Constitution: Negative for chills and fever.   Cardiovascular: Positive for leg swelling. Negative for chest pain and claudication.   Respiratory: Negative for cough and shortness of breath.    Skin: Positive for dry skin, nail changes, poor wound healing and suspicious lesions.   Gastrointestinal: Negative for nausea and vomiting.   Neurological: Positive for numbness and paresthesias.   Psychiatric/Behavioral: Negative for altered mental status.           Objective:      Physical Exam   Constitutional: She is oriented to person, place, and time. She appears well-developed and well-nourished.   HENT:   Head: Normocephalic.   Cardiovascular: Intact distal pulses.    Pulses:       Dorsalis pedis pulses are 1+ on the right side, and 1+ on the left side.        Posterior tibial pulses are 1+ on the right side, and 1+ on the left side.   CRT < 3 sec to tips of toes. 2+ edema noted to b/l LE. No vericosities noted to b/l LEs.      Pulmonary/Chest: No respiratory distress.   Musculoskeletal:   Gastrocnemius equinus noted to b/l ankles with decreased DF noted on exam. MMT 5/5 in DF/PF/Inv/Ev resistance with no reproduction of pain in  any direction. Passive range of motion of ankle and pedal joints is painless. Adequate pedal joint ROM.      Feet:   Right Foot:   Protective Sensation: 10 sites tested. 0 sites sensed.   Skin Integrity: Positive for ulcer, skin breakdown and dry skin.   Left Foot:   Protective Sensation: 10 sites tested. 0 sites sensed.   Skin Integrity: Positive for dry skin. Negative for ulcer or skin breakdown.   Neurological: She is alert and oriented to person, place, and time. She has normal strength. A sensory deficit is present.   Light touch, proprioception, and sharp/dull sensation are all intact bilaterally. Protective threshold with the Miller-Wienstein monofilament is completely diminished bilaterally.      Skin: Skin is warm, dry and intact. No erythema.   Generalized hyperpigmentation to ankle and lower 1/3 of R leg consistent with hemosiderin deposits. Overall skin is thin, shiny, atrophic to b/l LEs. Loss of pedal hair b/l. Toes are cool to touch b/l.     Open wound noted to plantar tip of R hallux  Measurement: 0 x 0 x 0cm healed with fragile epithelium.     Open wound noted to medial lower leg R  Measurement: 0.5 x 0.2 x 0.1cm  Border: irregular  Base: 80/20 Granular/fibrous mix with biofilm  Drainage: serous only  Erythema: none  Edema: 2+ pitting to entire leg  Odor: none  Purulence: none    Diffuse xerosis noted to plantar aspects of b/l foot/heels.      Psychiatric: She has a normal mood and affect. Her behavior is normal. Judgment and thought content normal.   Vitals reviewed.            Assessment:       Encounter Diagnoses   Name Primary?    ESRD on hemodialysis Yes    Morbid obesity with BMI of 40.0-44.9, adult     Bilateral lower extremity edema     Healed ulcer of right foot on examination     Leg ulcer, right, with fat layer exposed     Idiopathic peripheral neuropathy          Plan:       Diagnoses and all orders for this visit:    ESRD on hemodialysis    Morbid obesity with BMI of 40.0-44.9,  adult    Bilateral lower extremity edema    Healed ulcer of right foot on examination    Leg ulcer, right, with fat layer exposed  -     Debridement    Idiopathic peripheral neuropathy      I counseled the patient on her conditions, their implications and medical management.    Wounds assessed and examined. No signs of clinical infection at this time. Excisional debridement preformed today. See separate procedure note. Significant improvement noted in both appearance and size of wound. R hallux wound has healed. Leg wound nearly resolved.     Cleansed wound with saline, dried well. Applied [place] to leg wound, wound foam, UNNA boot to R leg and football dressing using 2 rolls of cast padding and secured with coban.     Follow up 1 week for reassessment, sooner PRN    D/c HH at this time

## 2017-08-31 NOTE — PROCEDURES
"Wound Debridement  Date/Time: 8/31/2017 2:13 PM  Performed by: DOMI CHANG  Authorized by: DOMI CHANG     Time out: Immediately prior to procedure a "time out" was called to verify the correct patient, procedure, equipment, support staff and site/side marked as required.    Consent Done?:  Yes (Verbal)    Preparation: Patient was prepped and draped in usual sterile fashion    Local anesthesia used?: No      Wound Details:    Location:  Right leg    Type of Debridement:  Excisional       Length (cm):  0.5       Area (sq cm):  0.15       Width (cm):  0.3       Percent Debrided (%):  100       Depth (cm):  0.1       Total Area Debrided (sq cm):  0.15    Depth of debridement:  Subcutaneous tissue    Tissue debrided:  Dermis, Epidermis and Subcutaneous    Devitalized tissue debrided:  Biofilm and Fibrin    Instruments:  Curette    Bleeding:  Minimal  Hemostasis Achieved: Yes    Method Used:  Pressure  Patient tolerance:  Patient tolerated the procedure well with no immediate complications      "

## 2017-09-06 ENCOUNTER — OFFICE VISIT (OUTPATIENT)
Dept: NEUROLOGY | Facility: CLINIC | Age: 57
End: 2017-09-06
Payer: MEDICARE

## 2017-09-06 ENCOUNTER — LAB VISIT (OUTPATIENT)
Dept: LAB | Facility: HOSPITAL | Age: 57
End: 2017-09-06
Payer: MEDICARE

## 2017-09-06 ENCOUNTER — ANTI-COAG VISIT (OUTPATIENT)
Dept: CARDIOLOGY | Facility: CLINIC | Age: 57
End: 2017-09-06

## 2017-09-06 VITALS
BODY MASS INDEX: 40.73 KG/M2 | HEART RATE: 70 BPM | HEIGHT: 68 IN | SYSTOLIC BLOOD PRESSURE: 100 MMHG | WEIGHT: 268.75 LBS | DIASTOLIC BLOOD PRESSURE: 73 MMHG

## 2017-09-06 DIAGNOSIS — I48.0 PAROXYSMAL ATRIAL FIBRILLATION: ICD-10-CM

## 2017-09-06 DIAGNOSIS — Z79.01 LONG-TERM (CURRENT) USE OF ANTICOAGULANTS: ICD-10-CM

## 2017-09-06 DIAGNOSIS — R20.2 PARESTHESIAS: Primary | ICD-10-CM

## 2017-09-06 LAB
INR PPP: 1.9
PROTHROMBIN TIME: 19.4 SEC

## 2017-09-06 PROCEDURE — 99999 PR PBB SHADOW E&M-EST. PATIENT-LVL IV: CPT | Mod: PBBFAC,,, | Performed by: PSYCHIATRY & NEUROLOGY

## 2017-09-06 PROCEDURE — 3008F BODY MASS INDEX DOCD: CPT | Mod: S$GLB,,, | Performed by: PSYCHIATRY & NEUROLOGY

## 2017-09-06 PROCEDURE — 99213 OFFICE O/P EST LOW 20 MIN: CPT | Mod: S$GLB,,, | Performed by: PSYCHIATRY & NEUROLOGY

## 2017-09-06 PROCEDURE — 36415 COLL VENOUS BLD VENIPUNCTURE: CPT

## 2017-09-06 PROCEDURE — 99499 UNLISTED E&M SERVICE: CPT | Mod: S$GLB,,, | Performed by: PSYCHIATRY & NEUROLOGY

## 2017-09-06 PROCEDURE — 85610 PROTHROMBIN TIME: CPT

## 2017-09-06 RX ORDER — CLOPIDOGREL BISULFATE 75 MG/1
75 TABLET ORAL DAILY
COMMUNITY
Start: 2017-08-10 | End: 2018-03-26 | Stop reason: SDUPTHER

## 2017-09-06 RX ORDER — NORTRIPTYLINE HYDROCHLORIDE 25 MG/1
25 CAPSULE ORAL NIGHTLY
Qty: 30 CAPSULE | Refills: 11 | Status: SHIPPED | OUTPATIENT
Start: 2017-09-06 | End: 2018-05-07

## 2017-09-06 NOTE — PROGRESS NOTES
[facility] - NEUROLOGY  [facility] [place]    Date: September 7, 2017   Patient Name: Sisi Medel   MRN: [id number]   PCP: Carlos A Caputo  Referring Provider: Carlos A Caputo MD    Assessment:      This is Sisi Medel, 56 y.o. female with complex medical problems including AF on coumadin, pulmonary sarcoid, ESRD on HD with orthostasis and recurrent syncope around dialysis who presents for follow up for her epilepsy.  She has symptoms of painful polyneuropathy noted on EMG 2014, no hx of DM but most likely glucose intolerance from long term steroid use verus sarcoid, strength is intact.     Plan:      -  Stop GBP and start pamelor  -  Compound cream  -  Continue LEV       I discussed side effects of the medications. I asked the patient to stop the medication if she notices serious adverse effects as we discussed and to seek immediate medical attention at an ER.     Emeka Lester MD  [facility]   Department of Neurology    Subjective:      HPI:   Ms. Sisi Medel is a 56 y.o. female who presents for follow up for epilepsy    Her seizures remain controlled and her main concern today is painful paresthesias in distal low extremities which started on right where she had shingles.  No relief from GBP, lidocaine/capsaicin cream.    PAST MEDICAL HISTORY:  Past Medical History:   Diagnosis Date    Anemia in ESRD (end-stage renal disease) 3/7/2016    Anticoagulant long-term use     Cervical radiculopathy 7/20/2015    CHF (congestive heart failure)     Chronic combined systolic and diastolic heart failure 6/14/2013    EF 20% 2013, improved with Medical therapy, negative PET 2013    Chronic respiratory failure with hypoxia 04/22/2013    On home oxygen at 2-5 liters    Closed fracture of proximal end of right fibula 6/27/2016    Complications due to renal dialysis device, implant, and graft     DDD (degenerative disc disease), lumbar  2015    ESRD on hemodialysis 2016    Essential hypertension     Gout     Lateral meniscal tear 2016    Lumbar stenosis 2015    Paroxysmal atrial fibrillation 2014    Not on anticoagulation    Peripheral neuropathy 2013    Personal history of gastric ulcer 3/19/2013    Sarcoidosis, lung 2009    Diagnosed in  with ocular manifestation, on 4L home O2 and PO steroids    Secondary pulmonary hypertension 2015    Seizure disorder 3/19/2013    Seizures     Shingles 2013    Thyroid disease        PAST SURGICAL HISTORY:  Past Surgical History:   Procedure Laterality Date    ABDOMINAL SURGERY      CARDIAC PACEMAKER PLACEMENT       SECTION      x2    OTHER SURGICAL HISTORY      loop recorder implant    stent to fistula      VASCULAR SURGERY      fistula to L upper arm        CURRENT MEDS:  Current Outpatient Prescriptions   Medication Sig Dispense Refill    ACZONE 5 % topical gel Apply to face every morning  2    ammonium lactate (LAC-HYDRIN) 12 % lotion Apply topically as needed (to legs).   2    atorvastatin (LIPITOR) 80 MG tablet TAKE 1 TABLET(80 MG) BY MOUTH EVERY DAY (Patient taking differently: TAKE 1 TABLET(80 MG) BY MOUTH EVERY NIGHT) 90 tablet 3    cholecalciferol, vitamin D3, 1,000 unit capsule Take 1 capsule (1,000 Units total) by mouth once daily. (Patient taking differently: Take 1,000 Units by mouth every evening. )  0    clopidogrel (PLAVIX) 75 mg tablet 75 mg.      digoxin (LANOXIN) 125 mcg tablet Take 1 tablet (0.125 mg total) by mouth every other day. 15 tablet 1    econazole nitrate 1 % cream APPLY TOPICALLY TWICE DAILY AS NEEDED FOR FUNGAL SKIN INFECTIONS. USE FOR 2 TO 4 WEEKS 30 g 0    fludrocortisone (FLORINEF) 0.1 mg Tab Take 100 mcg by mouth 2 (two) times daily.      ketorolac 0.5% (ACULAR) 0.5 % Drop instill one drop into both eyes every morning  3    levetiracetam (KEPPRA) 500 MG Tab Take 1 tablet (500 mg total) by mouth  2 (two) times daily. 60 tablet 5    midodrine (PROAMATINE) 5 MG Tab Take 2 tablets (10 mg total) by mouth every Mon, Wed, Fri, Sun. 32 tablet 11    omeprazole (PRILOSEC) 20 MG capsule TAKE 1 CAPSULE(20 MG) BY MOUTH EVERY DAY 30 capsule 3    polyethylene glycol (GLYCOLAX) 17 gram/dose powder Take 17 g by mouth once daily. Dissolve in juice. 510 g 0    prednisoLONE acetate (PRED FORTE) 1 % DrpS INSTILL ONE DROP INTO  LEFT EYE THREE TIMES A DAY  3    predniSONE (DELTASONE) 10 MG tablet TAKE 1 TABLET BY MOUTH EVERY DAY WITH BREAKFAST 90 tablet 0    RENVELA 800 mg Tab TAKE 1 TABLET BY MOUTH THREE TIMES DAILY WITH MEALS 90 tablet 0    SENSIPAR 30 mg Tab       tizanidine 4 mg Cap TAKE 1 CAPSULE BY MOUTH DAILY AS NEEDED 60 capsule 0    warfarin (COUMADIN) 5 MG tablet Take 1 tablet (5 mg total) by mouth Daily. [facility] (Patient taking differently: Take 5 mg by mouth every evening. [facility]) 90 tablet 3    diclofenac sodium 1 % Gel Apply 2 g topically every 8 (eight) hours as needed. 100 g 0    nortriptyline (PAMELOR) 25 MG capsule Take 1 capsule (25 mg total) by mouth every evening. 30 capsule 11     No current facility-administered medications for this visit.        ALLERGIES:  Review of patient's allergies indicates:   Allergen Reactions    Bactrim [sulfamethoxazole-trimethoprim] Other (See Comments)     Causes renal failure    Nsaids (non-steroidal anti-inflammatory drug) Other (See Comments)     Renal failure       FAMILY HISTORY:  Family History   Problem Relation Age of Onset    Kidney failure Mother     Hypertension Mother     Diabetes Mother     Coronary artery disease Father     Hypertension Father     Diabetes Father     Lupus Sister     Diabetes Maternal Grandmother     Colon cancer Neg Hx     Ovarian cancer Neg Hx     Breast cancer Neg Hx        SOCIAL HISTORY:  Social History   Substance Use Topics    Smoking status: Former Smoker     Packs/day: 0.50     Years:  "10.00     Types: Cigarettes     Quit date: 4/22/1994    Smokeless tobacco: Never Used    Alcohol use No      Comment: rarely       Review of Systems:  12 review of systems is negative except for the symptoms mentioned in HPI.        Objective:     Vitals:    09/06/17 0750   BP: 100/73   Pulse: 70   Weight: 121.9 kg (268 lb 11.9 oz)   Height: 5' 8" (1.727 m)       General: NAD, well nourished   Eyes: no tearing, discharge, no erythema   ENT: moist mucous membranes of the oral cavity, nares patent    Neck: Supple, full range of motion  Cardiovascular: Warm and well perfused, pulses equal and symmetrical  Lungs: Normal work of breathing, normal chest wall excursions  Skin: No rash, lesions, or breakdown on exposed skin  Psychiatry: Mood and affect are appropriate   Abdomen: soft, non tender, non distended  Extremeties: No cyanosis, clubbing or edema.    Neurological   MENTAL STATUS: Alert and oriented to person, place, and time. Attention and concentration within normal limits. Speech without dysarthria, able to name and repeat without difficulty. Recent and remote memory within normal limits   CRANIAL NERVES: Visual fields intact. PERRL. EOMI. Facial sensation intact. Face symmetrical. Hearing grossly intact. Full shoulder shrug bilaterally. Tongue protrudes midline   SENSORY: Sensation is intact to light touch throughout.  Negative Romberg.   MOTOR: Normal bulk and tone. No pronator drift.  5/5 deltoid, biceps, triceps, interosseous, hand  bilaterally. 5/5 iliopsoas, knee extension/flexion, foot dorsi/plantarflexion bilaterally.    REFLEXES: Toes down going bilaterally.   CEREBELLAR/COORDINATION/GAIT: Gait steady with normal arm swing and stride length.  Heel to shin intact. Finger to nose intact. Normal rapid alternating movements.   "

## 2017-09-06 NOTE — PATIENT INSTRUCTIONS
Stop gabapentin, take pamelor every night at bed time.    You will be called for prescription for compound cream

## 2017-09-06 NOTE — LETTER
September 7, 2017      Carlos A Caputo MD  [address]  [place] [address]           [facility]  [address]  [facility] [address]  Phone: [phone]  Fax: [phone]          Patient: Sisi Medel   MR Number: [id number]   YOB: 1960   Date of Visit: 9/6/2017       Dear Dr. Carlos A Caputo:    Thank you for referring Sisi Medel to me for evaluation. Attached you will find relevant portions of my assessment and plan of care.    If you have questions, please do not hesitate to call me. I look forward to following Sisi Medel along with you.    Sincerely,    Emeka Lester MD    Enclosure  CC:  No Recipients    If you would like to receive this communication electronically, please contact [email or web address] or [phone] to request more information on [email or web address] Link access.    For providers and/or their staff who would like to refer a patient to [facility], please contact us through our one-stop-shop provider referral line, [facility], [place], at [phone].    If you feel you have received this communication in error or would no longer like to receive these types of communications, please e-mail [email or web address]

## 2017-09-07 ENCOUNTER — OFFICE VISIT (OUTPATIENT)
Dept: PODIATRY | Facility: CLINIC | Age: 57
End: 2017-09-07
Payer: MEDICARE

## 2017-09-07 VITALS
SYSTOLIC BLOOD PRESSURE: 107 MMHG | HEART RATE: 70 BPM | HEIGHT: 68 IN | DIASTOLIC BLOOD PRESSURE: 73 MMHG | WEIGHT: 268 LBS | BODY MASS INDEX: 40.62 KG/M2

## 2017-09-07 DIAGNOSIS — Z99.2 ESRD ON DIALYSIS: Primary | ICD-10-CM

## 2017-09-07 DIAGNOSIS — R60.0 BILATERAL LOWER EXTREMITY EDEMA: ICD-10-CM

## 2017-09-07 DIAGNOSIS — Z87.2 HEALED ULCER OF RIGHT FOOT ON EXAMINATION: ICD-10-CM

## 2017-09-07 DIAGNOSIS — N18.6 ESRD ON DIALYSIS: Primary | ICD-10-CM

## 2017-09-07 DIAGNOSIS — E66.01 MORBID OBESITY WITH BMI OF 40.0-44.9, ADULT: Chronic | ICD-10-CM

## 2017-09-07 DIAGNOSIS — G60.9 IDIOPATHIC PERIPHERAL NEUROPATHY: ICD-10-CM

## 2017-09-07 PROCEDURE — 99499 UNLISTED E&M SERVICE: CPT | Mod: S$GLB,,, | Performed by: PODIATRIST

## 2017-09-07 PROCEDURE — 99999 PR PBB SHADOW E&M-EST. PATIENT-LVL III: CPT | Mod: PBBFAC,,, | Performed by: PODIATRIST

## 2017-09-07 PROCEDURE — 99213 OFFICE O/P EST LOW 20 MIN: CPT | Mod: S$GLB,,, | Performed by: PODIATRIST

## 2017-09-07 PROCEDURE — 3008F BODY MASS INDEX DOCD: CPT | Mod: S$GLB,,, | Performed by: PODIATRIST

## 2017-09-07 NOTE — PROGRESS NOTES
Subjective:      Patient ID: Sisi Medel is a 56 y.o. female.    Chief Complaint: ESRD on hemodialysis (rt foot)    Sisi is a 56 y.o. female who presents to the clinic for evaluation and treatment of high risk feet. Sisi has a past medical history of Anemia in ESRD (end-stage renal disease) (3/7/2016); Anticoagulant long-term use; Cervical radiculopathy (7/20/2015); CHF (congestive heart failure); Chronic combined systolic and diastolic heart failure (6/14/2013); Chronic respiratory failure with hypoxia (04/22/2013); Closed fracture of proximal end of right fibula (6/27/2016); Complications due to renal dialysis device, implant, and graft; DDD (degenerative disc disease), lumbar (6/17/2015); ESRD on hemodialysis (2/23/2016); Essential hypertension; Gout; Lateral meniscal tear (5/31/2016); Lumbar stenosis (6/17/2015); Paroxysmal atrial fibrillation (5/16/2014); Peripheral neuropathy (11/13/2013); Personal history of gastric ulcer (3/19/2013); Sarcoidosis, lung (2009); Secondary pulmonary hypertension (4/7/2015); Seizure disorder (3/19/2013); Seizures; Shingles (11/13/2013); and Thyroid disease. The patient's chief complaint is follow up of multiple previous wounds on the right foot/leg.  has been changing the dressing as ordered. Pain resolved. Relates improvement with each dressing change. No other complaints today. Brought her regular extra depth shoe today.  This patient has documented high risk feet requiring routine maintenance secondary to peripheral neuropathy.    PCP: Carlos A Caputo MD    Date Last Seen by PCP: 5/1/17  Chief Complaint   Patient presents with    ESRD on hemodialysis     rt foot         Current shoe gear:  Affected Foot: Football, UNNA boot, darco     Unaffected Foot: Slip-on shoes    Last encounter in this department: Visit date not found    Hemoglobin A1C   Date Value Ref Range Status   09/06/2017 6.8 (H) 4.0 - 5.6 % Final     Comment:     According to ADA guidelines,  hemoglobin A1c <7.0% represents  optimal control in non-pregnant diabetic patients. Different  metrics may apply to specific patient populations.   Standards of Medical Care in Diabetes-2016.  For the purpose of screening for the presence of diabetes:  <5.7%     Consistent with the absence of diabetes  5.7-6.4%  Consistent with increasing risk for diabetes   (prediabetes)  >or=6.5%  Consistent with diabetes  Currently, no consensus exists for use of hemoglobin A1c  for diagnosis of diabetes for children.  This Hemoglobin A1c assay has significant interference with fetal   hemoglobin   (HbF). The results are invalid for patients with abnormal amounts of   HbF,   including those with known Hereditary Persistence   of Fetal Hemoglobin. Heterozygous hemoglobin variants (HbAS, HbAC,   HbAD, HbAE, HbA2) do not significantly interfere with this assay;   however, presence of multiple variants in a sample may impact the %   interference.     05/17/2016 5.7 4.5 - 6.2 % Final   10/15/2015 6.7 (H) 4.5 - 6.2 % Final     Past Medical History:   Diagnosis Date    Anemia in ESRD (end-stage renal disease) 3/7/2016    Anticoagulant long-term use     Cervical radiculopathy 7/20/2015    CHF (congestive heart failure)     Chronic combined systolic and diastolic heart failure 6/14/2013    EF 20% 2013, improved with Medical therapy, negative PET 2013    Chronic respiratory failure with hypoxia 04/22/2013    On home oxygen at 2-5 liters    Closed fracture of proximal end of right fibula 6/27/2016    Complications due to renal dialysis device, implant, and graft     DDD (degenerative disc disease), lumbar 6/17/2015    ESRD on hemodialysis 2/23/2016    Essential hypertension     Gout     Lateral meniscal tear 5/31/2016    Lumbar stenosis 6/17/2015    Paroxysmal atrial fibrillation 5/16/2014    Not on anticoagulation    Peripheral neuropathy 11/13/2013    Personal history of gastric ulcer 3/19/2013    Sarcoidosis, lung 2009     Diagnosed in  with ocular manifestation, on 4L home O2 and PO steroids    Secondary pulmonary hypertension 2015    Seizure disorder 3/19/2013    Seizures     Shingles 2013    Thyroid disease        Past Surgical History:   Procedure Laterality Date    ABDOMINAL SURGERY      CARDIAC PACEMAKER PLACEMENT       SECTION      x2    OTHER SURGICAL HISTORY      loop recorder implant    stent to fistula      VASCULAR SURGERY      fistula to L upper arm        Family History   Problem Relation Age of Onset    Kidney failure Mother     Hypertension Mother     Diabetes Mother     Coronary artery disease Father     Hypertension Father     Diabetes Father     Lupus Sister     Diabetes Maternal Grandmother     Colon cancer Neg Hx     Ovarian cancer Neg Hx     Breast cancer Neg Hx        Social History     Social History    Marital status: Single     Spouse name: N/A    Number of children: N/A    Years of education: N/A     Social History Main Topics    Smoking status: Former Smoker     Packs/day: 0.50     Years: 10.00     Types: Cigarettes     Quit date: 1994    Smokeless tobacco: Never Used    Alcohol use No      Comment: rarely    Drug use: No    Sexual activity: Not Currently     Other Topics Concern    None     Social History Narrative    Lives with boyfriend/partner who helps with her care.            Current Outpatient Prescriptions   Medication Sig Dispense Refill    ACZONE 5 % topical gel Apply to face every morning  2    ammonium lactate (LAC-HYDRIN) 12 % lotion Apply topically as needed (to legs).   2    atorvastatin (LIPITOR) 80 MG tablet TAKE 1 TABLET(80 MG) BY MOUTH EVERY DAY (Patient taking differently: TAKE 1 TABLET(80 MG) BY MOUTH EVERY NIGHT) 90 tablet 3    cholecalciferol, vitamin D3, 1,000 unit capsule Take 1 capsule (1,000 Units total) by mouth once daily. (Patient taking differently: Take 1,000 Units by mouth every evening. )  0    clopidogrel  (PLAVIX) 75 mg tablet 75 mg.      digoxin (LANOXIN) 125 mcg tablet Take 1 tablet (0.125 mg total) by mouth every other day. 15 tablet 1    econazole nitrate 1 % cream APPLY TOPICALLY TWICE DAILY AS NEEDED FOR FUNGAL SKIN INFECTIONS. USE FOR 2 TO 4 WEEKS 30 g 0    fludrocortisone (FLORINEF) 0.1 mg Tab Take 100 mcg by mouth 2 (two) times daily.      ketorolac 0.5% (ACULAR) 0.5 % Drop instill one drop into both eyes every morning  3    levetiracetam (KEPPRA) 500 MG Tab Take 1 tablet (500 mg total) by mouth 2 (two) times daily. 60 tablet 5    midodrine (PROAMATINE) 5 MG Tab Take 2 tablets (10 mg total) by mouth every Mon, Wed, Fri, Sun. 32 tablet 11    nortriptyline (PAMELOR) 25 MG capsule Take 1 capsule (25 mg total) by mouth every evening. 30 capsule 11    omeprazole (PRILOSEC) 20 MG capsule TAKE 1 CAPSULE(20 MG) BY MOUTH EVERY DAY 30 capsule 3    polyethylene glycol (GLYCOLAX) 17 gram/dose powder Take 17 g by mouth once daily. Dissolve in juice. 510 g 0    prednisoLONE acetate (PRED FORTE) 1 % DrpS INSTILL ONE DROP INTO  LEFT EYE THREE TIMES A DAY  3    predniSONE (DELTASONE) 10 MG tablet TAKE 1 TABLET BY MOUTH EVERY DAY WITH BREAKFAST 90 tablet 0    RENVELA 800 mg Tab TAKE 1 TABLET BY MOUTH THREE TIMES DAILY WITH MEALS 90 tablet 0    SENSIPAR 30 mg Tab       tizanidine 4 mg Cap TAKE 1 CAPSULE BY MOUTH DAILY AS NEEDED 60 capsule 0    warfarin (COUMADIN) 5 MG tablet Take 1 tablet (5 mg total) by mouth Daily. [facility] (Patient taking differently: Take 5 mg by mouth every evening. Per [facility]) 90 tablet 3    diclofenac sodium 1 % Gel Apply 2 g topically every 8 (eight) hours as needed. 100 g 0     No current facility-administered medications for this visit.        Review of patient's allergies indicates:   Allergen Reactions    Bactrim [sulfamethoxazole-trimethoprim] Other (See Comments)     Causes renal failure    Nsaids (non-steroidal anti-inflammatory drug) Other (See Comments)      Renal failure       Review of Systems   Constitution: Negative for chills and fever.   Cardiovascular: Positive for leg swelling. Negative for chest pain and claudication.   Respiratory: Negative for cough and shortness of breath.    Skin: Positive for dry skin, nail changes, poor wound healing and suspicious lesions.   Gastrointestinal: Negative for nausea and vomiting.   Neurological: Positive for numbness and paresthesias.   Psychiatric/Behavioral: Negative for altered mental status.           Objective:      Physical Exam   Constitutional: She is oriented to person, place, and time. She appears well-developed and well-nourished.   HENT:   Head: Normocephalic.   Cardiovascular: Intact distal pulses.    Pulses:       Dorsalis pedis pulses are 1+ on the right side, and 1+ on the left side.        Posterior tibial pulses are 1+ on the right side, and 1+ on the left side.   CRT < 3 sec to tips of toes. 2+ edema noted to b/l LE. No vericosities noted to b/l LEs.      Pulmonary/Chest: No respiratory distress.   Musculoskeletal:   Gastrocnemius equinus noted to b/l ankles with decreased DF noted on exam. MMT 5/5 in DF/PF/Inv/Ev resistance with no reproduction of pain in any direction. Passive range of motion of ankle and pedal joints is painless. Adequate pedal joint ROM.      Feet:   Right Foot:   Protective Sensation: 10 sites tested. 0 sites sensed.   Skin Integrity: Positive for ulcer, skin breakdown and dry skin.   Left Foot:   Protective Sensation: 10 sites tested. 0 sites sensed.   Skin Integrity: Positive for dry skin. Negative for ulcer or skin breakdown.   Neurological: She is alert and oriented to person, place, and time. She has normal strength. A sensory deficit is present.   Light touch, proprioception, and sharp/dull sensation are all intact bilaterally. Protective threshold with the Honeydew-Wienstein monofilament is completely diminished bilaterally.      Skin: Skin is warm, dry and intact. No erythema.    Generalized hyperpigmentation to ankle and lower 1/3 of R leg consistent with hemosiderin deposits. Overall skin is thin, shiny, atrophic to b/l LEs. Loss of pedal hair b/l. Toes are cool to touch b/l.     Open wound noted to plantar tip of R hallux  Measurement: 0 x 0 x 0cm healed with fragile epithelium.   No signs of infection     Open wound noted to medial lower leg R  Measurement: 0 x 0 x 0cm fully healed, fragile epithelium. No signs of infection.     Diffuse xerosis noted to plantar aspects of b/l foot/heels.      Psychiatric: She has a normal mood and affect. Her behavior is normal. Judgment and thought content normal.   Vitals reviewed.            Assessment:       Encounter Diagnoses   Name Primary?    ESRD on dialysis Yes    Morbid obesity with BMI of 40.0-44.9, adult     Idiopathic peripheral neuropathy     Bilateral lower extremity edema     Healed ulcer of right foot on examination          Plan:       Sisi was seen today for esrd on hemodialysis.    Diagnoses and all orders for this visit:    ESRD on dialysis    Morbid obesity with BMI of 40.0-44.9, adult    Idiopathic peripheral neuropathy    Bilateral lower extremity edema    Healed ulcer of right foot on examination      I counseled the patient on her conditions, their implications and medical management.    Wounds assessed and examined. All wounds currently healed, fragile. Triple abx ointment and mepilex border applied to R great toe and medial lower leg wounds. Advised to preform this with Neosporin and regular bandaids for 1 week to allow skin to mature.     I warned patient of signs and symptoms of infection including redness, drainage, purulence, odor, streaking, fever, chills, etc and I advised her to seek medical attention (ER or urgent car) if these symptoms arise.     F/u 9 weeks, sooner PRN

## 2017-09-08 ENCOUNTER — HOSPITAL ENCOUNTER (OUTPATIENT)
Dept: VASCULAR SURGERY | Facility: CLINIC | Age: 57
Discharge: HOME | End: 2017-09-08
Payer: MEDICARE

## 2017-09-08 ENCOUNTER — OFFICE VISIT (OUTPATIENT)
Dept: VASCULAR SURGERY | Facility: CLINIC | Age: 57
End: 2017-09-08
Payer: MEDICARE

## 2017-09-08 VITALS
SYSTOLIC BLOOD PRESSURE: 127 MMHG | WEIGHT: 268 LBS | TEMPERATURE: 97 F | BODY MASS INDEX: 40.62 KG/M2 | DIASTOLIC BLOOD PRESSURE: 81 MMHG | HEIGHT: 68 IN | HEART RATE: 70 BPM

## 2017-09-08 DIAGNOSIS — T82.898A ARTERIOVENOUS FISTULA OCCLUSION: ICD-10-CM

## 2017-09-08 DIAGNOSIS — T82.590D AV GRAFT MALFUNCTION, SUBSEQUENT ENCOUNTER: Primary | ICD-10-CM

## 2017-09-08 DIAGNOSIS — N18.6 ESRD (END STAGE RENAL DISEASE): ICD-10-CM

## 2017-09-08 DIAGNOSIS — N18.6 ESRD (END STAGE RENAL DISEASE): Primary | ICD-10-CM

## 2017-09-08 DIAGNOSIS — T82.858A AV GRAFT STENOSIS: ICD-10-CM

## 2017-09-08 PROCEDURE — 99214 OFFICE O/P EST MOD 30 MIN: CPT | Mod: S$GLB,,, | Performed by: SURGERY

## 2017-09-08 PROCEDURE — 3008F BODY MASS INDEX DOCD: CPT | Mod: S$GLB,,, | Performed by: SURGERY

## 2017-09-08 PROCEDURE — 93990 DOPPLER FLOW TESTING: CPT | Mod: S$GLB,,, | Performed by: SURGERY

## 2017-09-08 PROCEDURE — 99999 PR PBB SHADOW E&M-EST. PATIENT-LVL IV: CPT | Mod: PBBFAC,,, | Performed by: SURGERY

## 2017-09-08 PROCEDURE — 99499 UNLISTED E&M SERVICE: CPT | Mod: S$GLB,,, | Performed by: SURGERY

## 2017-09-08 RX ORDER — LIDOCAINE HYDROCHLORIDE 10 MG/ML
1 INJECTION, SOLUTION EPIDURAL; INFILTRATION; INTRACAUDAL; PERINEURAL ONCE
Status: CANCELLED | OUTPATIENT
Start: 2017-09-08 | End: 2017-09-08

## 2017-09-08 NOTE — PROGRESS NOTES
Sisi Medel  10/07/2016    HPI:  Patient is a 55 y.o.  female with a h/o HTN, HLD, CHF (EF 20%), COPD on Home O2, Paroxysmal Afib (On Coumadin anticoagulation), Seizure disorder, ESRD on HD via LUE AVG who is here for evaluation of her LUE upper arm AV graft (placed by Dr. Villafana 2/3/16) following declot of graft on 2/7/17. She underwent (PTA outflow stenosis (8x60 mustang); Stent outflow stenosis (8x50 Viabahn)) 5/2017. She has been doing well since, using her LUE AVG without issue. No problems with accessing, with flow or with deaccessing. She reports that at dialysis they have reported hearing a whistle when they listen to her graft.     S/p   2/3/16: LUE AVG creation (Dr Villafana)  6/21/16: Percutaneous mechanical thrombectomy w Possis Angiojet AVX; 4fr OTW embolectomy of arterial inflow   PTA outflow stenosis with a 7x40 mm balloon  10/12/16: fistulogram (75% stenosis noted), left upper extremity AV graft PTA venous outflow with resolution of stenosis noted  2/2017 Declot and venous outflow PTA and stent with 8 x 50 VIABAHN  5/15/2017: PTA outflow stenosis (8x60 mustang); Stent outflow stenosis (8x50 Viabahn)      Findings/Key Components:   Probably stenosis of graft  Good palpable thrill     No  Prior  MI/stroke  Tobacco use: Former smoker     Past Medical History   Diagnosis Date    Anemia in ESRD (end-stage renal disease) 3/7/2016    Cervical radiculopathy 7/20/2015    Chronic combined systolic and diastolic heart failure 6/14/2013     EF 20% 2013, improved with Medical therapy, negative PET 2013    Chronic respiratory failure with hypoxia 04/22/2013     On home oxygen at 2-5 liters    Chronic rhinitis 10/2/2013    DDD (degenerative disc disease), lumbar 6/17/2015    ESRD on hemodialysis 2/23/2016    Essential hypertension     Gout     Hyperlipidemia 3/7/2016    Lateral meniscal tear 5/31/2016    Lumbar stenosis 6/17/2015    Morbid obesity 8/15/2013    Paroxysmal  atrial fibrillation 2014     Not on anticoagulation    Peripheral neuropathy 2013    Personal history of fall 2014    Personal history of gastric ulcer 3/19/2013    Sarcoidosis, lung 2009     Diagnosed in  with ocular manifestation, on 4L home O2 and PO steroids    Secondary pulmonary hypertension 2015    Seizure disorder 3/19/2013    Shingles 2013    Spondylarthrosis 2015     Past Surgical History   Procedure Laterality Date     section, classic      Abdominal surgery      Vascular surgery       Family History   Problem Relation Age of Onset    Kidney failure Mother     Hypertension Mother     Diabetes Mother     Coronary artery disease Father     Hypertension Father     Diabetes Father     Lupus Sister     Diabetes Maternal Grandmother     Asthma Neg Hx     Emphysema Neg Hx     Melanoma Neg Hx     Psoriasis Neg Hx      Social History     Social History    Marital status: Single     Spouse name: N/A    Number of children: N/A    Years of education: N/A     Occupational History    Not on file.     Social History Main Topics    Smoking status: Former Smoker     Packs/day: 0.50     Years: 10.00     Types: Cigarettes     Quit date: 1994    Smokeless tobacco: Never Used    Alcohol use No    Drug use: No    Sexual activity: No     Other Topics Concern    Not on file     Social History Narrative    Lives with boyfriend/partner who helps with her care.     Plans to travel to [place] for 2 weeks in 2016     Current Outpatient Prescriptions on File Prior to Visit   Medication Sig    ACZONE 5 % topical gel Apply topically once daily.     allopurinol (ZYLOPRIM) 100 MG tablet Take 1 tablet (100 mg total) by mouth once daily.    amiodarone (PACERONE) 200 MG Tab Take 1 tablet (200 mg total) by mouth every morning.    ammonium lactate (LAC-HYDRIN) 12 % lotion Apply topically as needed (for scars on arms).     aspirin 81 MG Chew Take 81 mg by mouth  every morning.    atorvastatin (LIPITOR) 80 MG tablet Take 80 mg by mouth once daily.     capsicum 0.075% (ZOSTRIX) 0.075 % topical cream Apply topically 3 (three) times daily. For neuropathic pain of feet    gabapentin (NEURONTIN) 100 MG capsule TAKE ONE CAPSULE BY MOUTH THREE TIMES DAILY (Patient taking differently: TAKE ONE CAPSULE BY MOUTH THREE TIMES DAILY-noon, evening, bedtime)    levetiracetam (KEPPRA) 500 MG Tab TAKE 1 TABLET BY MOUTH TWICE DAILY.    melatonin 5 mg Tab Take 1 tablet by mouth nightly.    midodrine (PROAMATINE) 10 MG tablet     midodrine (PROAMATINE) 5 MG Tab     NEPHRO-LINDA 0.8 mg Tab TK 1 T PO  QD    ondansetron (ZOFRAN-ODT) 8 MG TbDL Take 1 tablet (8 mg total) by mouth every 8 (eight) hours as needed.    oxycodone-acetaminophen (PERCOCET) 7.5-325 mg per tablet Take 1 tablet by mouth every 4 (four) hours as needed.    predniSONE (DELTASONE) 10 MG tablet TAKE 3 TABLETS BY MOUTH FOR 7 DAYS, THEN 2 TABLETS FOR 7 DAYS, THEN 1 TABLET EVERY DAY AFTER.    RENVELA 800 mg Tab TAKE 1 TABLET BY MOUTH THREE TIMES DAILY WITH MEALS    tizanidine 4 mg Cap Take 4 mg by mouth 2 (two) times daily as needed. (Patient taking differently: Take 4 mg by mouth daily as needed (for muscle spasms.). )    warfarin (COUMADIN) 5 MG tablet TAKE 1 TABLET(5 MG) BY MOUTH DAILY     No current facility-administered medications on file prior to visit.        REVIEW OF SYSTEMS:  General: negative; ENT: negative; Allergy and Immunology: negative; Hematological and Lymphatic: Negative; Endocrine: negative; Respiratory: no cough, shortness of breath, or wheezing; Cardiovascular: no chest pain or dyspnea on exertion; Gastrointestinal: no abdominal pain/back, change in bowel habits, or bloody stools; Genito-Urinary: no dysuria, trouble voiding, or hematuria; Musculoskeletal: no pain, numbness, to LUE  Neurological: no TIA or stroke symptoms; Psychiatric: no nervousness, anxiety or depression.    PHYSICAL EXAM:  "  Vitals:    07/14/16 1532   BP: (!) 81/57   Pulse: (!) 52   Temp: 98.9 °F (37.2 °C)       General appearance:  Alert, well-appearing, and in no distress.  Oriented to person, place, and time   Neurological: Normal speech, no focal findings noted; CN II - XII grossly intact           Musculoskeletal: Digits/nail without cyanosis/clubbing.  Normal muscle strength/tone.                 Neck: Supple, no significant adenopathy; thyroid is not enlarged                Chest:  Clear to auscultation, symmetric air entry     No use of accessory muscles             Cardiac: Normal rate and regular rhythm, S1 and S2 normal; PMI non-displaced          Abdomen: Soft, nontender, nondistended, no masses or organomegaly     no rebound tenderness noted      Extremities:   LUE AVG with palpable thrill, no increased pulsatility, 2+ distal radial pulse; LUE slightly swollen compared to right which patient reports is "always like that".       LAB RESULTS:  Lab Results   Component Value Date    K 4.5 07/12/2016    K 4.6 07/04/2016    K 5.1 07/03/2016    CREATININE 6.5 (H) 07/12/2016    CREATININE 10.2 (H) 07/04/2016    CREATININE 7.6 (H) 07/03/2016     Lab Results   Component Value Date    WBC 11.09 07/12/2016    WBC 10.38 07/04/2016    WBC 11.01 07/03/2016    HCT 37.5 07/12/2016    HCT 31.4 (L) 07/04/2016    HCT 30.1 (L) 07/03/2016     07/12/2016     07/04/2016     07/03/2016     Lab Results   Component Value Date    HGBA1C 5.7 05/17/2016    HGBA1C 6.7 (H) 10/15/2015    HGBA1C 5.5 06/15/2015     IMAGING:    Peak systolic velocity: 570 midgraft    Velocity: 1170        IMP/PLAN:  55 y.o. female with HTN, HLD, CHF (EF 20%), COPD on Home O2, Paroxysmal Afib (On Coumadin anticoagulation), Seizure disorder, ESRD on HD via LUE AVG who is here today for evaluation of her LUE upper arm AV graft (placed by Dr. Villafana 2/3/16) doing well with no complaints.   She is dialyzing well, with good flow in her AVG.  Duplex today " reveals a midgraft stenosis of >50% with peak systolic velocity > 570cm/sec.  Given her history of AV graft thrombosis and multiple interventions, we will plan for a fistulagram to evaluate and treat her stenosis. She understands and agrees with the current treatment plan.       1) LUE fistulagram 9/13/2017  2) continue coumadin and all medications    Torrey Villafana MD  Vascular & Endovascular Surgery

## 2017-09-09 RX ORDER — LIDOCAINE HYDROCHLORIDE 10 MG/ML
1 INJECTION, SOLUTION EPIDURAL; INFILTRATION; INTRACAUDAL; PERINEURAL ONCE
Status: CANCELLED | OUTPATIENT
Start: 2017-09-09 | End: 2017-09-09

## 2017-09-11 ENCOUNTER — TELEPHONE (OUTPATIENT)
Dept: PODIATRY | Facility: CLINIC | Age: 57
End: 2017-09-11

## 2017-09-11 NOTE — TELEPHONE ENCOUNTER
----- Message from Bree De Los Santos MA sent at 9/11/2017  3:12 PM CDT -----  Contact: [facility]      ----- Message -----  From: Ivette Cooper  Sent: 9/11/2017   2:45 PM  To: Boubacar Leary is requesting the release order from wound care from the doctor. The order can be faxed to Fax: [phone] contact:[phone] ext [phone]

## 2017-09-12 ENCOUNTER — TELEPHONE (OUTPATIENT)
Dept: VASCULAR SURGERY | Facility: CLINIC | Age: 57
End: 2017-09-12

## 2017-09-12 NOTE — TELEPHONE ENCOUNTER
Sisi Medel notified that her surgery is scheduled for 0730am on 09/13/17. she needs to arrive to the 3rd floor Cardiac short stay in the hospital @ 630am. she should not eat or drink anything after midnight. Sisi Medel verbalize understanding.

## 2017-09-13 ENCOUNTER — HOSPITAL ENCOUNTER (OUTPATIENT)
Facility: HOSPITAL | Age: 57
Discharge: HOME | End: 2017-09-13
Attending: SURGERY | Admitting: SURGERY
Payer: MEDICARE

## 2017-09-13 ENCOUNTER — SURGERY (OUTPATIENT)
Age: 57
End: 2017-09-13

## 2017-09-13 VITALS
HEART RATE: 71 BPM | HEIGHT: 68 IN | OXYGEN SATURATION: 98 % | BODY MASS INDEX: 40.92 KG/M2 | WEIGHT: 270 LBS | SYSTOLIC BLOOD PRESSURE: 127 MMHG | DIASTOLIC BLOOD PRESSURE: 89 MMHG | TEMPERATURE: 97 F | RESPIRATION RATE: 18 BRPM

## 2017-09-13 DIAGNOSIS — T82.858A AV GRAFT STENOSIS: ICD-10-CM

## 2017-09-13 DIAGNOSIS — T82.590D OTHER MECHANICAL COMPLICATION OF SURGICALLY CREATED ARTERIOVENOUS FISTULA, SUBSEQUENT ENCOUNTER: ICD-10-CM

## 2017-09-13 PROCEDURE — 36902 INTRO CATH DIALYSIS CIRCUIT: CPT | Mod: ,,, | Performed by: SURGERY

## 2017-09-13 PROCEDURE — C1894 INTRO/SHEATH, NON-LASER: HCPCS

## 2017-09-13 PROCEDURE — 25000003 PHARM REV CODE 250

## 2017-09-13 PROCEDURE — 63600175 PHARM REV CODE 636 W HCPCS

## 2017-09-13 RX ORDER — LIDOCAINE HYDROCHLORIDE 10 MG/ML
1 INJECTION, SOLUTION EPIDURAL; INFILTRATION; INTRACAUDAL; PERINEURAL ONCE
Status: DISCONTINUED | OUTPATIENT
Start: 2017-09-13 | End: 2017-09-13 | Stop reason: HOSPADM

## 2017-09-13 RX ORDER — CEFAZOLIN SODIUM 2 G/50ML
2 SOLUTION INTRAVENOUS
Status: DISCONTINUED | OUTPATIENT
Start: 2017-09-13 | End: 2017-09-13 | Stop reason: HOSPADM

## 2017-09-13 NOTE — INTERVAL H&P NOTE
The patient has been examined and the H&P has been reviewed:    I concur with the findings and no changes have occurred since H&P was written.    Anesthesia/Surgery risks, benefits and alternative options discussed and understood by patient/family.          Active Hospital Problems    Diagnosis  POA    AV graft stenosis [T82.530Q]  Yes      Resolved Hospital Problems    Diagnosis Date Resolved POA   No resolved problems to display.

## 2017-09-13 NOTE — DISCHARGE SUMMARY
[facility]  Discharge Note  Short Stay    Admit Date: 9/13/2017    Discharge Date and Time: No discharge date for patient encounter.     Attending Physician: Torrey Villafana MD     Discharge Provider: Yonatan Guerra    Diagnoses:  Active Hospital Problems    Diagnosis  POA    *AV graft stenosis [T82.858A]  Yes      Resolved Hospital Problems    Diagnosis Date Resolved POA   No resolved problems to display.       Discharged Condition: good    Hospital Course: Patient was admitted for an outpatient procedure and tolerated the procedure well with no complications.    Final Diagnoses: Same as principal problem.    Disposition: Home or Self Care    Follow up/Patient Instructions:    Medications:  Reconciled Home Medications:   Current Discharge Medication List      CONTINUE these medications which have NOT CHANGED    Details   atorvastatin (LIPITOR) 80 MG tablet TAKE 1 TABLET(80 MG) BY MOUTH EVERY DAY  Qty: 90 tablet, Refills: 3    Associated Diagnoses: Chronic combined systolic and diastolic heart failure      cholecalciferol, vitamin D3, 1,000 unit capsule Take 1 capsule (1,000 Units total) by mouth once daily.  Refills: 0      clopidogrel (PLAVIX) 75 mg tablet 75 mg.      digoxin (LANOXIN) 125 mcg tablet Take 1 tablet (0.125 mg total) by mouth every other day.  Qty: 15 tablet, Refills: 1      fludrocortisone (FLORINEF) 0.1 mg Tab Take 100 mcg by mouth 2 (two) times daily.      levetiracetam (KEPPRA) 500 MG Tab Take 1 tablet (500 mg total) by mouth 2 (two) times daily.  Qty: 60 tablet, Refills: 5    Associated Diagnoses: Seizure disorder      midodrine (PROAMATINE) 5 MG Tab Take 2 tablets (10 mg total) by mouth every Mon, Wed, Fri, Sun.  Qty: 32 tablet, Refills: 11      nortriptyline (PAMELOR) 25 MG capsule Take 1 capsule (25 mg total) by mouth every evening.  Qty: 30 capsule, Refills: 11    Associated Diagnoses: Paresthesias      omeprazole (PRILOSEC) 20 MG capsule TAKE 1 CAPSULE(20 MG) BY MOUTH EVERY  DAY  Qty: 30 capsule, Refills: 3    Associated Diagnoses: Gastroesophageal reflux disease with esophagitis      polyethylene glycol (GLYCOLAX) 17 gram/dose powder Take 17 g by mouth once daily. Dissolve in juice.  Qty: 510 g, Refills: 0      prednisoLONE acetate (PRED FORTE) 1 % DrpS INSTILL ONE DROP INTO  LEFT EYE THREE TIMES A DAY  Refills: 3      predniSONE (DELTASONE) 10 MG tablet TAKE 1 TABLET BY MOUTH EVERY DAY WITH BREAKFAST  Qty: 90 tablet, Refills: 0    Associated Diagnoses: Sarcoidosis of lung      SENSIPAR 30 mg Tab       tizanidine 4 mg Cap TAKE 1 CAPSULE BY MOUTH DAILY AS NEEDED  Qty: 60 capsule, Refills: 0    Associated Diagnoses: Muscle spasm      warfarin (COUMADIN) 5 MG tablet Take 1 tablet (5 mg total) by mouth Daily. Per [facility]  Qty: 90 tablet, Refills: 3    Comments: **Patient requests 90 days supply**  Associated Diagnoses: Long-term (current) use of anticoagulants; Paroxysmal atrial fibrillation      ACZONE 5 % topical gel Apply to face every morning  Refills: 2      ammonium lactate (LAC-HYDRIN) 12 % lotion Apply topically as needed (to legs).   Refills: 2      diclofenac sodium 1 % Gel Apply 2 g topically every 8 (eight) hours as needed.  Qty: 100 g, Refills: 0      econazole nitrate 1 % cream APPLY TOPICALLY TWICE DAILY AS NEEDED FOR FUNGAL SKIN INFECTIONS. USE FOR 2 TO 4 WEEKS  Qty: 30 g, Refills: 0      ketorolac 0.5% (ACULAR) 0.5 % Drop instill one drop into both eyes every morning  Refills: 3      RENVELA 800 mg Tab TAKE 1 TABLET BY MOUTH THREE TIMES DAILY WITH MEALS  Qty: 90 tablet, Refills: 0             Discharge Procedure Orders  VAS US Doppler Venous Arm Left   Standing Status: Future  Standing Exp. Date: 09/13/18     Diet general     Activity as tolerated       Follow-up Information     Torrey Villafana MD In 3 weeks.    Specialty:  Vascular Surgery  Why:  For suture removal, with vas venous u/s left arm  Contact information:  [address]  [facility]  [address]  [phone]                   Discharge Procedure Orders (must include Diet, Follow-up, Activity):    Discharge Procedure Orders (must include Diet, Follow-up, Activity)  VAS US Doppler Venous Arm Left   Standing Status: Future  Standing Exp. Date: 09/13/18     Diet general     Activity as tolerated

## 2017-09-13 NOTE — PLAN OF CARE
Problem: Patient Care Overview  Goal: Plan of Care Review  Outcome: Ongoing (interventions implemented as appropriate)  Received report from Cath lab RN. Patient s/p fistulagram, AAOx3. VSS, no c/o pain or discomfort at this time, resp even and unlabored. Gauze/tegaderm dressing to fistula is CDI. No active bleeding. No hematoma noted. Post procedure protocol reviewed with patient and patient's family. Understanding verbalized. Family members at bedside. Nurse call bell within reach. Will continue to monitor per post procedure protocol.

## 2017-09-13 NOTE — OP NOTE
Brief Operative Note  Date: 09/13/2017    Surgeon(s) and Role:     * Torrey Villafana MD - Primary    Pre-op Diagnosis:  AV graft malfunction, subsequent encounter [T82.362D];    Post-op Diagnosis:  midgraft stenosis, LUE AVG    Procedure(s):  1)  PTA (7 x 40 Conquest) LUE AVG stenosis  2)  Fistulagram LUE, central venogram, reflux angiogram    Surgeon: Torrey Villafana MD  Vascular & Endovascular Surgery     Assistant: NEY BUTT MD    Anesthesia: Local MAC    Findings/Key Components:  Successful treatment of midgraft stenosis.  No central or inflow stenoses noted, widely patent outflow stents    EBL: Minimal         Specimens     None          I attest to being present for the procedure and performing the case.  Torrey Villafana MD  Vascular & Endovascular Surgery     Discharge Note  SUMMARY    Admit Date: 9/13/2017    Attending Physician: Torrey Villafana MD  Vascular & Endovascular Surgery       Discharge Physician: Torrey Villafana MD  Vascular & Endovascular Surgery       Discharge Date: 09/13/2017    Final Diagnosis: AV graft malfunction, subsequent encounter [T82.187D]    Disposition: Home or self-care    Patient Instructions:   Discharge Medication List as of 9/13/2017  9:31 AM      CONTINUE these medications which have NOT CHANGED    Details   atorvastatin (LIPITOR) 80 MG tablet TAKE 1 TABLET(80 MG) BY MOUTH EVERY DAY, Normal      cholecalciferol, vitamin D3, 1,000 unit capsule Take 1 capsule (1,000 Units total) by mouth once daily., Starting 3/8/2017, Until Discontinued, No Print      clopidogrel (PLAVIX) 75 mg tablet 75 mg., Starting Thu 8/10/2017, Historical Med      digoxin (LANOXIN) 125 mcg tablet Take 1 tablet (0.125 mg total) by mouth every other day., Starting Mon 6/19/2017, Until Tue 6/19/2018, Print      fludrocortisone (FLORINEF) 0.1 mg Tab Take 100 mcg by mouth 2 (two) times daily., Until Discontinued, Historical Med      levetiracetam (KEPPRA) 500 MG Tab Take 1 tablet (500 mg total) by  mouth 2 (two) times daily., Starting Mon 6/19/2017, Normal      midodrine (PROAMATINE) 5 MG Tab Take 2 tablets (10 mg total) by mouth every Mon, Wed, Fri, Sun., Starting 2/19/2017, Until Mon 2/19/18, Normal      nortriptyline (PAMELOR) 25 MG capsule Take 1 capsule (25 mg total) by mouth every evening., Starting Wed 9/6/2017, Until Thu 9/6/2018, Normal      omeprazole (PRILOSEC) 20 MG capsule TAKE 1 CAPSULE(20 MG) BY MOUTH EVERY DAY, Normal      polyethylene glycol (GLYCOLAX) 17 gram/dose powder Take 17 g by mouth once daily. Dissolve in juice., Starting 2/11/2017, Until Discontinued, Normal      prednisoLONE acetate (PRED FORTE) 1 % DrpS INSTILL ONE DROP INTO  LEFT EYE THREE TIMES A DAY, Historical Med      predniSONE (DELTASONE) 10 MG tablet TAKE 1 TABLET BY MOUTH EVERY DAY WITH BREAKFAST, Normal      SENSIPAR 30 mg Tab Starting Tue 5/23/2017, Historical Med      tizanidine 4 mg Cap TAKE 1 CAPSULE BY MOUTH DAILY AS NEEDED, Starting Fri 8/25/2017, Normal      warfarin (COUMADIN) 5 MG tablet Take 1 tablet (5 mg total) by mouth Daily. Per [facility], Starting 4/3/2017, Until Discontinued, Normal      ACZONE 5 % topical gel Apply to face every morning, Starting 1/6/2016, Until Discontinued, Historical Med      ammonium lactate (LAC-HYDRIN) 12 % lotion Apply topically as needed (to legs). , Starting 1/5/2016, Until Discontinued, Historical Med      diclofenac sodium 1 % Gel Apply 2 g topically every 8 (eight) hours as needed., Starting Thu 2/16/2017, Until Fri 8/4/2017, Normal      econazole nitrate 1 % cream APPLY TOPICALLY TWICE DAILY AS NEEDED FOR FUNGAL SKIN INFECTIONS. USE FOR 2 TO 4 WEEKS, Normal      ketorolac 0.5% (ACULAR) 0.5 % Drop instill one drop into both eyes every morning, Historical Med      RENVELA 800 mg Tab TAKE 1 TABLET BY MOUTH THREE TIMES DAILY WITH MEALS, Normal             Diet:  Resume pre-operative diet    Activity:  Ad rosa    Follow-up:  Follow-up in clinic with Dr Villafana within 2-3  weeks; please call clinic nurse at

## 2017-09-13 NOTE — OP NOTE
9/13/2017      Pre-operative Diagnosis:  Left arm AV graft stenosis  Post-operative Diagnosis: same.    Procedures:  1. Fistulogram  2. PTA x 2 (conquest 7 x 40mm)    Surgeon: Torrey Villafana MD     Assistants: Yonatan Guerra MD    Anesthesia: RN IV sedation    Findings/Key elements:   1. Proximal graft stenosis   2. Resolution of stenosis with excellent thrill  3. No central venous stenosis    EBL:  minimal           Complications:  None; patient tolerated the procedure well.           Disposition: Home.            Condition: stable    Procedure Details:  The patient was seen in the Holding Room. The risks, benefits, complications, treatment options, and expected outcomes were discussed with the patient. The patient concurred with the proposed plan, giving informed consent.  The site of surgery properly noted/marked.   Patient was brought to the cath lab and positioned supine on the table. RN IV sedation was given. Left upper arm was prepped and draped in usual sterile fashion. A Time Out was held and the above information confirmed. Small amount of local anesthetic was injected at the site of cannulation. Proximal AV graft was accessed facing the outflow using 21G micropuncture needle. 4/3 catheter/dilator was placed. 4F sheath was used to shoot a fistulgram. Proximal graft stenosis was identified. Catheter was exchanged for short 6F flush sheath over wire. 7x40mm Conquest non-compliant high pressure balloon was used to perform PTA x 2 through this stenotic segment. Repeat fistulogram revealed resolution of stenosis and brisk washout of contrast. Central venogram did not reveal any stenosis. Wire and sheath was removed and skin was closed with single 3-0 monocryl suture. Great thrill was noted over the graft and 2+ radial pulse was noted. Dry dressing was placed.    Patient tolerated procedure well and transferred to recovery area.      Dr. Villafana was scrubbed and present for entire case.    Yonatan Guerra  MD  Vascular/Endovascular Surgery Fellow

## 2017-09-13 NOTE — PROGRESS NOTES
Patient discharged per MD orders. Instructions given on medications, wound care, activity, signs of infection, when to call MD, and follow up appointments. Pt verbalized understanding. PIV removed. Patient and family refused transport, patient has walker.

## 2017-09-20 ENCOUNTER — ANTI-COAG VISIT (OUTPATIENT)
Dept: CARDIOLOGY | Facility: CLINIC | Age: 57
End: 2017-09-20
Payer: MEDICARE

## 2017-09-20 DIAGNOSIS — Z79.01 CURRENT USE OF LONG TERM ANTICOAGULATION: Primary | ICD-10-CM

## 2017-09-20 LAB — INR PPP: 1.9 (ref 2–3)

## 2017-09-20 PROCEDURE — 99211 OFF/OP EST MAY X REQ PHY/QHP: CPT | Mod: 25,S$GLB,, | Performed by: PHARMACIST

## 2017-09-20 PROCEDURE — 85610 PROTHROMBIN TIME: CPT | Mod: QW,S$GLB,, | Performed by: PHARMACIST

## 2017-09-20 NOTE — PROGRESS NOTES
INR continues to hover low. Patient actually reports increase in alcohol recently due to her birthday. I will increase her dose to target the middle of the therapeutic range. Patient was re-educated on situations that would require placing a call to the coumadin clinic, including bleeding or unusual bruising issues, changes in health, diet or medications, upcoming procedures that require warfarin interruption, and missed coumadin dose(s). Patient expressed understanding that avoidance of consistency with these parameters could cause fluctuations in INR, leading to more frequent visits and increase risk of adverse events.

## 2017-09-27 RX ORDER — ECONAZOLE NITRATE 10 MG/G
CREAM TOPICAL
Qty: 85 G | Refills: 0 | OUTPATIENT
Start: 2017-09-27

## 2017-09-27 RX ORDER — GABAPENTIN 300 MG/1
CAPSULE ORAL
Qty: 270 CAPSULE | Refills: 0 | OUTPATIENT
Start: 2017-09-27

## 2017-09-27 NOTE — LETTER
May 12, 2017      Kaitlin Clark, IVETH  [address] [facility] [address]           [facility] - Vascular Surgery  [address]  [place] [address]  Phone: [phone]  Fax: [phone]          Patient: Sisi Medel   MR Number: [id number]   YOB: 1960   Date of Visit: 5/12/2017       Dear Kaitlin Clark:    Thank you for referring Sisi Medel to me for evaluation. Attached you will find relevant portions of my assessment and plan of care.    If you have questions, please do not hesitate to call me. I look forward to following Sisi Medel along with you.    Sincerely,    Torrey Villafana MD    Enclosure  CC:  No Recipients    If you would like to receive this communication electronically, please contact [email or web address] or [phone] to request more information on [employer] Link access.    For providers and/or their staff who would like to refer a patient to [facility], please contact us through our one-stop-shop provider referral line, [facility], at [phone].    If you feel you have received this communication in error or would no longer like to receive these types of communications, please e-mail [email or web address]         
independent

## 2017-10-02 ENCOUNTER — OFFICE VISIT (OUTPATIENT)
Dept: TRANSPLANT | Facility: CLINIC | Age: 57
End: 2017-10-02
Payer: MEDICARE

## 2017-10-02 ENCOUNTER — LAB VISIT (OUTPATIENT)
Dept: LAB | Facility: HOSPITAL | Age: 57
End: 2017-10-02
Attending: INTERNAL MEDICINE
Payer: MEDICARE

## 2017-10-02 ENCOUNTER — ANTI-COAG VISIT (OUTPATIENT)
Dept: CARDIOLOGY | Facility: CLINIC | Age: 57
End: 2017-10-02

## 2017-10-02 VITALS
HEIGHT: 68 IN | SYSTOLIC BLOOD PRESSURE: 93 MMHG | WEIGHT: 283.06 LBS | DIASTOLIC BLOOD PRESSURE: 67 MMHG | HEART RATE: 68 BPM | BODY MASS INDEX: 42.9 KG/M2

## 2017-10-02 DIAGNOSIS — Z79.01 CURRENT USE OF LONG TERM ANTICOAGULATION: ICD-10-CM

## 2017-10-02 DIAGNOSIS — Z99.2 ESRD ON DIALYSIS: ICD-10-CM

## 2017-10-02 DIAGNOSIS — D86.0 PULMONARY SARCOIDOSIS: Chronic | ICD-10-CM

## 2017-10-02 DIAGNOSIS — I50.42 CHRONIC COMBINED SYSTOLIC AND DIASTOLIC HEART FAILURE: Primary | Chronic | ICD-10-CM

## 2017-10-02 DIAGNOSIS — N18.6 ESRD ON DIALYSIS: ICD-10-CM

## 2017-10-02 DIAGNOSIS — J98.4 CRLD (CHRONIC RESTRICTIVE LUNG DISEASE): ICD-10-CM

## 2017-10-02 DIAGNOSIS — Z95.0 BIVENTRICULAR CARDIAC PACEMAKER IN SITU: ICD-10-CM

## 2017-10-02 DIAGNOSIS — J96.11 CHRONIC RESPIRATORY FAILURE WITH HYPOXIA: Chronic | ICD-10-CM

## 2017-10-02 DIAGNOSIS — I48.21 PERMANENT ATRIAL FIBRILLATION: ICD-10-CM

## 2017-10-02 LAB
ALBUMIN SERPL BCP-MCNC: 3.4 G/DL
ALP SERPL-CCNC: 230 U/L
ALT SERPL W/O P-5'-P-CCNC: 23 U/L
ANION GAP SERPL CALC-SCNC: 12 MMOL/L
AST SERPL-CCNC: 20 U/L
BASOPHILS # BLD AUTO: 0.01 K/UL
BASOPHILS NFR BLD: 0.1 %
BILIRUB SERPL-MCNC: 0.7 MG/DL
BNP SERPL-MCNC: 883 PG/ML
BUN SERPL-MCNC: 52 MG/DL
CALCIUM SERPL-MCNC: 8.6 MG/DL
CHLORIDE SERPL-SCNC: 93 MMOL/L
CO2 SERPL-SCNC: 29 MMOL/L
CREAT SERPL-MCNC: 7.8 MG/DL
DIFFERENTIAL METHOD: ABNORMAL
EOSINOPHIL # BLD AUTO: 0.1 K/UL
EOSINOPHIL NFR BLD: 0.5 %
ERYTHROCYTE [DISTWIDTH] IN BLOOD BY AUTOMATED COUNT: 15.5 %
EST. GFR  (AFRICAN AMERICAN): 6.1 ML/MIN/1.73 M^2
EST. GFR  (NON AFRICAN AMERICAN): 5.3 ML/MIN/1.73 M^2
GLUCOSE SERPL-MCNC: 124 MG/DL
HCT VFR BLD AUTO: 40.4 %
HGB BLD-MCNC: 12.4 G/DL
INR PPP: 2.7
LYMPHOCYTES # BLD AUTO: 1.8 K/UL
LYMPHOCYTES NFR BLD: 19.2 %
MCH RBC QN AUTO: 30.8 PG
MCHC RBC AUTO-ENTMCNC: 30.7 G/DL
MCV RBC AUTO: 101 FL
MONOCYTES # BLD AUTO: 0.7 K/UL
MONOCYTES NFR BLD: 7.3 %
NEUTROPHILS # BLD AUTO: 6.7 K/UL
NEUTROPHILS NFR BLD: 72.7 %
PLATELET # BLD AUTO: 224 K/UL
PMV BLD AUTO: 10 FL
POTASSIUM SERPL-SCNC: 5.3 MMOL/L
PROT SERPL-MCNC: 7.5 G/DL
PROTHROMBIN TIME: 27.3 SEC
RBC # BLD AUTO: 4.02 M/UL
SODIUM SERPL-SCNC: 134 MMOL/L
WBC # BLD AUTO: 9.17 K/UL

## 2017-10-02 PROCEDURE — 99999 PR PBB SHADOW E&M-EST. PATIENT-LVL III: CPT | Mod: PBBFAC,,, | Performed by: INTERNAL MEDICINE

## 2017-10-02 PROCEDURE — 99214 OFFICE O/P EST MOD 30 MIN: CPT | Mod: S$GLB,,, | Performed by: INTERNAL MEDICINE

## 2017-10-02 PROCEDURE — 85025 COMPLETE CBC W/AUTO DIFF WBC: CPT

## 2017-10-02 PROCEDURE — 85610 PROTHROMBIN TIME: CPT

## 2017-10-02 PROCEDURE — 99499 UNLISTED E&M SERVICE: CPT | Mod: S$GLB,,, | Performed by: INTERNAL MEDICINE

## 2017-10-02 PROCEDURE — 83880 ASSAY OF NATRIURETIC PEPTIDE: CPT

## 2017-10-02 PROCEDURE — 3008F BODY MASS INDEX DOCD: CPT | Mod: S$GLB,,, | Performed by: INTERNAL MEDICINE

## 2017-10-02 PROCEDURE — 36415 COLL VENOUS BLD VENIPUNCTURE: CPT

## 2017-10-02 PROCEDURE — 80053 COMPREHEN METABOLIC PANEL: CPT

## 2017-10-02 NOTE — PROGRESS NOTES
Subjective:    Patient ID:  Sisi Medel is a 57 y.o. female who presents for follow-up of Congestive Heart Failure (5mo visit)      Congestive Heart Failure   Pertinent negatives include no abdominal pain, change in bowel habit, chest pain, chills, coughing, diaphoresis, fever or weakness.       This is a 56 yo AAF with sarcoidosis, chronic combined CHF which demonstrated successful remodeling and most recently EF 40-45% range, PH, PAF, ESRD on HD MWF, morbid obesity, YOANDY, HBP, chronic respiratory failure who returns for HF/PH f/u (was on adcirca but stopped due to symptomatic hypotension)    Since last visit pt has been doing well overall- no hosp stays though she did have to come to the ER for stitches after dropping her o2 tank on her leg-  Has a good bit of bruising on her arm around her AV fistula.  Says her breathing has been so-so-  Says her HR was lowered and since then she doesn't feel as well- cant do much before she gets SOB- can only walk half a block before she has to stop- dry wt 120.5- and she is up to 128- she thinks she drinking too much as she's thirsty    Since last visit,   Review of Systems   Constitution: Positive for weight gain. Negative for chills, decreased appetite, diaphoresis, fever, weakness, malaise/fatigue, night sweats and weight loss.   HENT: Negative.    Eyes: Negative.    Cardiovascular: Positive for dyspnea on exertion and leg swelling. Negative for chest pain, cyanosis, irregular heartbeat, near-syncope, orthopnea, palpitations, paroxysmal nocturnal dyspnea and syncope.   Respiratory: Negative for cough, hemoptysis, shortness of breath, sleep disturbances due to breathing, snoring, sputum production and wheezing.    Endocrine: Negative.    Hematologic/Lymphatic: Bruises/bleeds easily.   Skin: Negative.    Musculoskeletal: Negative.    Gastrointestinal: Negative for bloating, abdominal pain, change in bowel habit and diarrhea.   Neurological: Negative for  "light-headedness and loss of balance.   Psychiatric/Behavioral: Negative for depression.     BP 93/67   Pulse 68   Ht 5' 8" (1.727 m)   Wt 128.4 kg (283 lb 1.1 oz)   LMP  (LMP Unknown)   BMI 43.04 kg/m²      Physical Exam   Constitutional: She is oriented to person, place, and time. She appears well-developed and well-nourished.   HENT:   Head: Normocephalic and atraumatic.   Eyes: Conjunctivae and EOM are normal. Pupils are equal, round, and reactive to light.   Neck: Neck supple. JVD present. No tracheal deviation present. No thyromegaly present.   Cardiovascular: Normal rate, regular rhythm and normal heart sounds.  PMI is displaced.  Exam reveals no gallop and no friction rub.    No murmur heard.  Pulmonary/Chest: Effort normal. No respiratory distress. She has no wheezes. She has rales. She exhibits no tenderness.   Few scattered crackles   Abdominal: Soft. Bowel sounds are normal. She exhibits no distension and no mass. There is no tenderness. There is no rebound and no guarding.   Musculoskeletal: Normal range of motion. She exhibits edema. She exhibits no tenderness.   1+ pitting edema to knees   Lymphadenopathy:     She has no cervical adenopathy.   Neurological: She is alert and oriented to person, place, and time. She has normal reflexes. No cranial nerve deficit. She exhibits normal muscle tone. Coordination normal.   Skin: Skin is warm and dry.   Psychiatric: She has a normal mood and affect. Her behavior is normal. Thought content normal.   Nursing note and vitals reviewed.          Chemistry        Component Value Date/Time     (L) 06/13/2017 0911    K 5.1 06/13/2017 0911     06/13/2017 0911    CO2 23 06/13/2017 0911    BUN 56 (H) 06/13/2017 0911    CREATININE 8.8 (H) 06/13/2017 0911     (H) 06/13/2017 0911        Component Value Date/Time    CALCIUM 8.8 06/13/2017 0911    ALKPHOS 130 05/08/2017 0720    AST 22 05/08/2017 0720    ALT 18 05/08/2017 0720    BILITOT 0.6 05/08/2017 " 0720            Magnesium   Date Value Ref Range Status   04/08/2017 2.1 1.6 - 2.6 mg/dL Final       Lab Results   Component Value Date    WBC 9.17 10/02/2017    HGB 12.4 10/02/2017    HCT 40.4 10/02/2017     (H) 10/02/2017     10/02/2017       Lab Results   Component Value Date    INR 2.7 (H) 10/02/2017    INR 1.9 (A) 09/20/2017    INR 1.9 (H) 09/06/2017       BNP   Date Value Ref Range Status   10/02/2017 883 (H) 0 - 99 pg/mL Final     Comment:     Values of less than 100 pg/ml are consistent with non-CHF populations.   05/08/2017 303 (H) 0 - 99 pg/mL Final     Comment:     Values of less than 100 pg/ml are consistent with non-CHF populations.   04/22/2017 206 (H) 0 - 99 pg/mL Final     Comment:     Values of less than 100 pg/ml are consistent with non-CHF populations.     Assessment:       1. Chronic combined systolic and diastolic CHF now on HD with hypotension limiting ability to pull volume off- doing better on midodrine-has alittle more volume overload on exam, remains FC III, BNP increased above her usual range- has not been compliant with fluid restrictions and I think this is the cause of her increased HYATT   2. Chronic hypoxic resp failure- on home O2, stable   3. Shortness of breath    4. Sarcoidosis- on systemic steroids   5. Pulmonary hypertension-   6. Paroxysmal atrial fibrillation- with plans for ablation    7. Morbid obesity    8. ESRD on HD MWF        Plan:   -Will have her ask her HD unit to either run her 4x/wk or longer on the machine during her usual treatments to get her closer to her dry wt  -encouraged her to try to suck on mariella or hard candies to cut the thirst  -already got her flu shot  -Recommend 2 gram sodium restriction  Encourage physical activity with graded exercise program.  Requested patient to weigh themselves daily, and to notify us if their weight increases by more than 3 lbs in 1 day or 5 lbs in 1 week.    F/u 6 mo with labs

## 2017-10-02 NOTE — LETTER
October 2, 2017        Aaron Ware  [address]  [id number]  [facility] [address]  Phone: [phone]  Fax: [phone]             [facility]  [address]  [facility] [address]  Phone: [phone]   Patient: Sisi Medel   MR Number: [id number]   YOB: 1960   Date of Visit: 10/2/2017       Dear Dr. Aaron Ware    Thank you for referring Sisi Medel to me for evaluation. Attached you will find relevant portions of my assessment and plan of care.    If you have questions, please do not hesitate to call me. I look forward to following Sisi Medel along with you.    Sincerely,    Cara Chavarria MD    Enclosure    If you would like to receive this communication electronically, please contact [email or web address] or [phone] to request [employer] access.    [employer] is a tool which provides read-only access to select patient information with whom you have a relationship. Its easy to use and provides real time access to review your patients record including encounter summaries, notes, results, and demographic information.    If you feel you have received this communication in error or would no longer like to receive these types of communications, please e-mail [email or web address]

## 2017-10-02 NOTE — PATIENT INSTRUCTIONS
I think you might benefit from a 4th dialysis treatment to try to get you closer to your dry weight or maybe they can run you longer on your regular treatments- I can hear a little fluid in your lungs, your legs are swollen and your BNP is above your baseline (the blood test that indicates the fluid levels in your systems)    In between treatments, try to suck on lemon wedges, hard candies and frozen grapes to cut back on  Your fluid intake    Keep salt intake to under 2000 mg sodium    Call us if you find yourself getting more short of breath, have more swelling or unexpected weight changes

## 2017-10-06 ENCOUNTER — TELEPHONE (OUTPATIENT)
Dept: VASCULAR SURGERY | Facility: CLINIC | Age: 57
End: 2017-10-06

## 2017-10-06 ENCOUNTER — HOSPITAL ENCOUNTER (OUTPATIENT)
Dept: VASCULAR SURGERY | Facility: CLINIC | Age: 57
Discharge: HOME | End: 2017-10-06
Attending: SURGERY
Payer: MEDICARE

## 2017-10-06 DIAGNOSIS — N18.6 ESRD ON DIALYSIS: ICD-10-CM

## 2017-10-06 DIAGNOSIS — Z99.2 ESRD ON DIALYSIS: ICD-10-CM

## 2017-10-06 DIAGNOSIS — T82.9XXD COMPLICATION OF ARTERIOVENOUS DIALYSIS FISTULA, SUBSEQUENT ENCOUNTER: Primary | ICD-10-CM

## 2017-10-06 DIAGNOSIS — T82.858A AV GRAFT STENOSIS: ICD-10-CM

## 2017-10-06 PROCEDURE — 93990 DOPPLER FLOW TESTING: CPT | Mod: S$GLB,,, | Performed by: SURGERY

## 2017-10-12 ENCOUNTER — HOSPITAL ENCOUNTER (OUTPATIENT)
Facility: HOSPITAL | Age: 57
Discharge: HOME | End: 2017-10-12
Attending: SURGERY | Admitting: SURGERY
Payer: MEDICARE

## 2017-10-12 VITALS
TEMPERATURE: 97 F | BODY MASS INDEX: 40.92 KG/M2 | OXYGEN SATURATION: 99 % | RESPIRATION RATE: 20 BRPM | WEIGHT: 270 LBS | HEIGHT: 68 IN | SYSTOLIC BLOOD PRESSURE: 106 MMHG | DIASTOLIC BLOOD PRESSURE: 61 MMHG | HEART RATE: 70 BPM

## 2017-10-12 DIAGNOSIS — Z99.2 ESRD (END STAGE RENAL DISEASE) ON DIALYSIS: ICD-10-CM

## 2017-10-12 DIAGNOSIS — T82.9XXD UNSPECIFIED COMPLICATION OF CARDIAC AND VASCULAR PROSTHETIC DEVICE, IMPLANT AND GRAFT, SUBSEQUENT ENCOUNTER: ICD-10-CM

## 2017-10-12 DIAGNOSIS — N18.6 ESRD (END STAGE RENAL DISEASE) ON DIALYSIS: ICD-10-CM

## 2017-10-12 DIAGNOSIS — T82.9XXD COMPLICATION OF ARTERIOVENOUS DIALYSIS FISTULA, SUBSEQUENT ENCOUNTER: Primary | ICD-10-CM

## 2017-10-12 PROBLEM — T82.9XXA COMPLICATION OF ARTERIOVENOUS DIALYSIS FISTULA: Status: ACTIVE | Noted: 2017-10-12

## 2017-10-12 PROCEDURE — 63600175 PHARM REV CODE 636 W HCPCS

## 2017-10-12 PROCEDURE — 99152 MOD SED SAME PHYS/QHP 5/>YRS: CPT | Mod: ,,, | Performed by: SURGERY

## 2017-10-12 PROCEDURE — C1894 INTRO/SHEATH, NON-LASER: HCPCS

## 2017-10-12 PROCEDURE — 36902 INTRO CATH DIALYSIS CIRCUIT: CPT | Mod: ,,, | Performed by: SURGERY

## 2017-10-12 PROCEDURE — 25000003 PHARM REV CODE 250

## 2017-10-12 NOTE — OP NOTE
10/12/2017    Surgeon(s) and Role:   Torrey Villafana MD    Assistant:  Yonatan Guerra MD    Pre-op Diagnosis:   T82.858A: Stenosis of vascular prosthetic devices, implants and grafts, initial encounter    Post-op Diagnosis: Same     Procedure(s):   1) LUE fistulogram   2) PTA LUE AV graft outflow stenosis (brachial vein) x 2: (7 x 60 [place]); (8 x 40 [place])  3) Conscious sedation    Anesthesia: RN IV sedation    Findings/Key Components:   Successful treatment of > 50% stenosis  Strong palpable thrill     EBL: Minimal    PROCEDURE IN DETAIL:  After an informed consent was obtained the patient was taken to the interventional suite and placed in the supine position. The patient was brought to the Cath Lab, placed in supine. Arm was prepped and draped in the standard surgical fashion. Under ultrasound guidance, the proximal aspect of the AVG was accessed with a micropuncture needle; ultrasound confirmed vessel patency, followed by placement of 4/3-Croatian micropuncture dilator. Through this, an 0.035-inch wire was placed in the short 6-Croatian sheath. Through this, an 0.035-inch Glidewire was placed through the high-grade stenosis, which was demonstrated by the angiogram. A moderate in-stent stenosis and a venous outflow stenosis of brachial vein just distal to stent was found, which was crossed with a hyrophilic 0.035-in glidewire. This was treated with 7x60mm high-pressure, noncompliant balloon x 2. We then treated the brachial vein with 8x40mm high-pressure, noncompliant balloon. Resolution of the stenosis was noted. Strong thrill could be felt. The sheath was removed, 3-0 nylon U-stitch was placed with good hemostasis.      Dr. Villafana was scrubbed and present for entire procedure.    MODERATE SEDATION IN CATH LAB   Torrey Villafana MD was present for moderate sedation  Torrey Villafana MD monitored the patients cardio respiratory functions during the moderate sedation   See nurses notes for Intra-service start and  end times and for the log of the name of the RN who administered the medicines for the moderate sedation, including their doseage and route.    Time of sedation:  30 mins.    Yonatan Guerra MD  Vascular/Endovascular Surgery Fellow

## 2017-10-12 NOTE — PLAN OF CARE
Problem: Patient Care Overview  Goal: Plan of Care Review  Outcome: Ongoing (interventions implemented as appropriate)  Received report from DEXTER Rinaldi. Patient s/p fistulagram, AAOx3. VSS, no c/o pain or discomfort at this time, resp even and unlabored. Gauze/tegaderm dressing to L UA is CDI. No active bleeding. No hematoma noted. Post procedure protocol reviewed with patient and patient's family. Understanding verbalized. Family members at bedside. Nurse call bell within reach. Will continue to monitor per post procedure protocol.

## 2017-10-12 NOTE — H&P
Sisi Medel  10/12/2017    HPI:  Patient is a 55 y.o.  female with a h/o HTN, HLD, CHF (EF 20%), COPD on Home O2, Paroxysmal Afib (On Coumadin anticoagulation), Seizure disorder, ESRD on HD via LUE AVG who is here for evaluation of her LUE upper arm AV graft (placed by Dr. Villafana 2/3/16) following declot of graft on 2/7/17. She underwent (PTA outflow stenosis (8x60 mustang); Stent outflow stenosis (8x50 Viabahn)) 5/2017 and most recently AVG stenosis PTA (7 x 40 [place]). She has been doing well since, but dialysis nurse having difficult time accessing distal stick and difficulty pulling fluid due to hypotension and prolonged Hd.  Denies prolonged bleeding after decannulation.   Denies Left hand numbness or tingling.       S/p   2/3/16: LUE AVG creation (Dr Villafana)  6/21/16: Percutaneous mechanical thrombectomy w Possis Angiojet AVX; 4fr OTW embolectomy of arterial inflow   PTA outflow stenosis with a 7x40 mm balloon  10/12/16: fistulogram (75% stenosis noted), left upper extremity AV graft PTA venous outflow with resolution of stenosis noted  2/2017 Declot and venous outflow PTA and stent with 8 x 50 VIABAHN  5/15/2017: PTA outflow stenosis (8x60 mustang); Stent outflow stenosis (8x50 Viabahn)  09/13/17:1)  PTA (7 x 40 Conquest) LUE AVG stenosis      Past Medical History:   Diagnosis Date    Anemia in ESRD (end-stage renal disease) 3/7/2016    Anticoagulant long-term use     Cervical radiculopathy 7/20/2015    CHF (congestive heart failure)     Chronic combined systolic and diastolic heart failure 6/14/2013    EF 20% 2013, improved with Medical therapy, negative PET 2013    Chronic respiratory failure with hypoxia 04/22/2013    On home oxygen at 2-5 liters    Closed fracture of proximal end of right fibula 6/27/2016    Complications due to renal dialysis device, implant, and graft     DDD (degenerative disc disease), lumbar 6/17/2015    ESRD on hemodialysis 2/23/2016     Essential hypertension     Gout     Lateral meniscal tear 2016    Lumbar stenosis 2015    Paroxysmal atrial fibrillation 2014    Not on anticoagulation    Peripheral neuropathy 2013    Personal history of gastric ulcer 3/19/2013    Sarcoidosis, lung 2009    Diagnosed in  with ocular manifestation, on 4L home O2 and PO steroids    Secondary pulmonary hypertension 2015    Seizure disorder 3/19/2013    Seizures     Shingles 2013    Thyroid disease      Past Surgical History:   Procedure Laterality Date    ABDOMINAL SURGERY      CARDIAC PACEMAKER PLACEMENT       SECTION      x2    OTHER SURGICAL HISTORY      loop recorder implant    stent to fistula      VASCULAR SURGERY      fistula to L upper arm      Family History   Problem Relation Age of Onset    Kidney failure Mother     Hypertension Mother     Diabetes Mother     Coronary artery disease Father     Hypertension Father     Diabetes Father     Lupus Sister     Diabetes Maternal Grandmother     Colon cancer Neg Hx     Ovarian cancer Neg Hx     Breast cancer Neg Hx      Social History     Social History    Marital status: Single     Spouse name: N/A    Number of children: N/A    Years of education: N/A     Occupational History    Not on file.     Social History Main Topics    Smoking status: Former Smoker     Packs/day: 0.50     Years: 10.00     Types: Cigarettes     Quit date: 1994    Smokeless tobacco: Never Used    Alcohol use No      Comment: rarely    Drug use: No    Sexual activity: Not Currently     Other Topics Concern    Not on file     Social History Narrative    Lives with boyfriend/partner who helps with her care.          Review of patient's allergies indicates:   Allergen Reactions    Bactrim [sulfamethoxazole-trimethoprim] Other (See Comments)     Causes renal failure    Nsaids (non-steroidal anti-inflammatory drug) Other (See Comments)     Renal failure  "      Current Facility-Administered Medications:     vancomycin 1 g in dextrose 5 % 250 mL IVPB (ready to mix system), 1,000 mg, Intravenous, Once Pre-Op, Yonatan Guerra MD    REVIEW OF SYSTEMS:  General: negative;   ENT: negative;   Allergy and Immunology: negative;   Hematological and Lymphatic: Negative;   Endocrine: negative;   Respiratory: no cough, shortness of breath, or wheezing;   Cardiovascular: no chest pain or dyspnea on exertion;   Gastrointestinal: no abdominal pain/back, change in bowel habits, or bloody stools; Genito-Urinary: no dysuria, trouble voiding, or hematuria;   Musculoskeletal: negative  Neurological: no TIA or stroke symptoms;   Psychiatric: no nervousness, anxiety or depression.    PHYSICAL EXAM:      Pulse: 70  Temp: 98 °F (36.7 °C)      General appearance:  Alert, well-appearing, and in no distress.  Oriented to person, place, and time   Neurological:  Normal speech, no focal findings noted; CN II - XII grossly intact           Musculoskeletal: Digits/nail without cyanosis/clubbing.  Normal muscle strength/tone.                 Neck: Supple, no significant adenopathy; thyroid is not enlarged                  No carotid bruit can be auscultated                Chest:  Clear to auscultation, no wheezes, rales or rhonchi, symmetric air entry      No use of accessory muscles             Cardiac: Normal rate and regular rhythm, S1 and S2 normal; PMI non-displaced          Abdomen: Soft, nontender, nondistended, no masses or organomegaly      No rebound tenderness noted; bowel sounds normal      Pulsatile aortic mass is not palpable.      No groin adenopathy      Extremities:   LUE AVG with palpable thrill, no increased pulsatility,       2+ distal radial pulse; LUE slightly swollen compared to right which patient reports is "always like that".      Some skin ecchymosis from coumadin.     LAB RESULTS:  Lab Results   Component Value Date    K 5.3 (H) 10/02/2017    K 5.1 06/13/2017    K 5.2 (H) " 06/12/2017    CREATININE 7.8 (H) 10/02/2017    CREATININE 8.8 (H) 06/13/2017    CREATININE 8.3 (H) 06/12/2017     Lab Results   Component Value Date    WBC 9.17 10/02/2017    WBC 8.62 06/13/2017    WBC 10.21 06/08/2017    HCT 40.4 10/02/2017    HCT 38.4 06/13/2017    HCT 45.5 06/08/2017     10/02/2017     06/13/2017     06/08/2017     Lab Results   Component Value Date    HGBA1C 6.8 (H) 09/06/2017    HGBA1C 5.7 05/17/2016    HGBA1C 6.7 (H) 10/15/2015       IMAGING:    IMP/PLAN:  57 y.o. female with Patient is a 55 y.o.  female with a h/o HTN, HLD, CHF (EF 20%), COPD on Home O2, Paroxysmal Afib (On Coumadin anticoagulation), Seizure disorder, ESRD on HD via LUE AVG who is here for evaluation of her LUE upper arm AV graft. Multiple interventions.     Difficulty with access and HD sessions.   Fistulogram today.     Yonatan Guerra MD  Vascular/Endovascular Surgery Fellow

## 2017-10-12 NOTE — OP NOTE
Brief Operative Note  Date: 10/12/2017    Surgeon(s) and Role:     * Torrey Villafana MD - Primary    Pre-op Diagnosis:  Complication of arteriovenous dialysis fistula, subsequent encounter [T82.9XXD];     Post-op Diagnosis:  Same    Procedure(s):  1) PTA LUE AV graft outflow (brachial vein) x 2: (7 x 60 [place]); (8 x 40 [place])  2) LUE fistulogram    Surgeon: Torrey Villafana MD  Vascular & Endovascular Surgery     Assistant:MD Randell    Anesthesia: RN IV Sedation    Findings/Key Components:  Successful treatment of moderate tandem venous outlfow stenoses, LUE AVG.     EBL: Minimal         Specimens     None          I attest to being present for the procedure and performing the case.  Torrey Villafana MD  Vascular & Endovascular Surgery     Discharge Note  SUMMARY    Admit Date: 10/12/2017    Attending Physician: Torrey Villafana MD  Vascular & Endovascular Surgery       Discharge Physician: Torrey Villafana MD  Vascular & Endovascular Surgery       Discharge Date: 10/12/2017    Final Diagnosis: Complication of arteriovenous dialysis fistula, subsequent encounter [T82.9XXD]    Disposition: Home or self-care    Patient Instructions:   Current Discharge Medication List      CONTINUE these medications which have NOT CHANGED    Details   atorvastatin (LIPITOR) 80 MG tablet TAKE 1 TABLET(80 MG) BY MOUTH EVERY DAY  Qty: 90 tablet, Refills: 3    Associated Diagnoses: Chronic combined systolic and diastolic heart failure      cholecalciferol, vitamin D3, 1,000 unit capsule Take 1 capsule (1,000 Units total) by mouth once daily.  Refills: 0      digoxin (LANOXIN) 125 mcg tablet Take 1 tablet (0.125 mg total) by mouth every other day.  Qty: 15 tablet, Refills: 1      levetiracetam (KEPPRA) 500 MG Tab Take 1 tablet (500 mg total) by mouth 2 (two) times daily.  Qty: 60 tablet, Refills: 5    Associated Diagnoses: Seizure disorder      midodrine (PROAMATINE) 5 MG Tab Take 2 tablets (10 mg total) by mouth every Mon,  Wed, Fri, Sun.  Qty: 32 tablet, Refills: 11      nortriptyline (PAMELOR) 25 MG capsule Take 1 capsule (25 mg total) by mouth every evening.  Qty: 30 capsule, Refills: 11    Associated Diagnoses: Paresthesias      omeprazole (PRILOSEC) 20 MG capsule TAKE 1 CAPSULE(20 MG) BY MOUTH EVERY DAY  Qty: 30 capsule, Refills: 3    Associated Diagnoses: Gastroesophageal reflux disease with esophagitis      predniSONE (DELTASONE) 10 MG tablet TAKE 1 TABLET BY MOUTH EVERY DAY WITH BREAKFAST  Qty: 90 tablet, Refills: 0    Associated Diagnoses: Sarcoidosis of lung      RENVELA 800 mg Tab TAKE 1 TABLET BY MOUTH THREE TIMES DAILY WITH MEALS  Qty: 90 tablet, Refills: 0      SENSIPAR 30 mg Tab       warfarin (COUMADIN) 5 MG tablet Take 1 tablet (5 mg total) by mouth Daily. Per Coumadin Clinic  Qty: 90 tablet, Refills: 3    Comments: **Patient requests 90 days supply**  Associated Diagnoses: Long-term (current) use of anticoagulants; Paroxysmal atrial fibrillation      ACZONE 5 % topical gel Apply to face every morning  Refills: 2      ammonium lactate (LAC-HYDRIN) 12 % lotion Apply topically as needed (to legs).   Refills: 2      clopidogrel (PLAVIX) 75 mg tablet 75 mg.      diclofenac sodium 1 % Gel Apply 2 g topically every 8 (eight) hours as needed.  Qty: 100 g, Refills: 0      econazole nitrate 1 % cream APPLY TOPICALLY TWICE DAILY AS NEEDED FOR FUNGAL SKIN INFECTIONS. USE FOR 2 TO 4 WEEKS  Qty: 30 g, Refills: 0      fludrocortisone (FLORINEF) 0.1 mg Tab Take 100 mcg by mouth 2 (two) times daily.      ketorolac 0.5% (ACULAR) 0.5 % Drop instill one drop into both eyes every morning  Refills: 3      polyethylene glycol (GLYCOLAX) 17 gram/dose powder Take 17 g by mouth once daily. Dissolve in juice.  Qty: 510 g, Refills: 0      prednisoLONE acetate (PRED FORTE) 1 % DrpS INSTILL ONE DROP INTO  LEFT EYE THREE TIMES A DAY  Refills: 3      tizanidine 4 mg Cap TAKE 1 CAPSULE BY MOUTH DAILY AS NEEDED  Qty: 60 capsule, Refills: 0     Associated Diagnoses: Muscle spasm             Diet:  Resume pre-operative diet    Activity:  Ad rosa    Follow-up:  Follow-up in clinic with Dr Villafana within 1-2 weeks; please call clinic nurse at

## 2017-10-12 NOTE — PROGRESS NOTES
Patient discharged per MD orders. Instructions given on medications, wound care, activity, signs of infection, when to call MD, and follow up appointments. Pt verbalized understanding.  Patient AAOx3, VSS, no c/o pain or discomfort at this time. PIV removed. Patient and family refused transport, ambulated off unit.

## 2017-10-15 PROBLEM — T82.9XXA COMPLICATION OF ARTERIOVENOUS DIALYSIS FISTULA: Status: ACTIVE | Noted: 2017-10-15

## 2017-10-22 DIAGNOSIS — K21.00 GASTROESOPHAGEAL REFLUX DISEASE WITH ESOPHAGITIS: ICD-10-CM

## 2017-10-22 DIAGNOSIS — M62.838 MUSCLE SPASM: ICD-10-CM

## 2017-10-23 DIAGNOSIS — M62.838 MUSCLE SPASM: ICD-10-CM

## 2017-10-23 RX ORDER — TIZANIDINE HYDROCHLORIDE 4 MG/1
1 CAPSULE, GELATIN COATED ORAL DAILY PRN
Qty: 90 CAPSULE | Refills: 0 | Status: SHIPPED | OUTPATIENT
Start: 2017-10-23 | End: 2018-01-22 | Stop reason: SDUPTHER

## 2017-10-23 RX ORDER — OMEPRAZOLE 20 MG/1
CAPSULE, DELAYED RELEASE ORAL
Qty: 90 CAPSULE | Refills: 3 | Status: SHIPPED | OUTPATIENT
Start: 2017-10-23 | End: 2018-02-24 | Stop reason: SDUPTHER

## 2017-10-23 RX ORDER — TIZANIDINE HYDROCHLORIDE 4 MG/1
1 CAPSULE, GELATIN COATED ORAL DAILY PRN
Qty: 60 CAPSULE | Refills: 0 | Status: SHIPPED | OUTPATIENT
Start: 2017-10-23 | End: 2017-10-23 | Stop reason: SDUPTHER

## 2017-10-30 ENCOUNTER — OFFICE VISIT (OUTPATIENT)
Dept: DERMATOLOGY | Facility: CLINIC | Age: 57
End: 2017-10-30
Payer: MEDICARE

## 2017-10-30 ENCOUNTER — ANTI-COAG VISIT (OUTPATIENT)
Dept: CARDIOLOGY | Facility: CLINIC | Age: 57
End: 2017-10-30
Payer: MEDICARE

## 2017-10-30 DIAGNOSIS — Z79.01 CURRENT USE OF LONG TERM ANTICOAGULATION: Primary | ICD-10-CM

## 2017-10-30 DIAGNOSIS — D69.2 PURPURA: Primary | ICD-10-CM

## 2017-10-30 LAB — INR PPP: 3.1 (ref 2–3)

## 2017-10-30 PROCEDURE — 99213 OFFICE O/P EST LOW 20 MIN: CPT | Mod: S$GLB,,, | Performed by: DERMATOLOGY

## 2017-10-30 PROCEDURE — 99499 UNLISTED E&M SERVICE: CPT | Mod: S$GLB,,, | Performed by: DERMATOLOGY

## 2017-10-30 PROCEDURE — 99211 OFF/OP EST MAY X REQ PHY/QHP: CPT | Mod: 25,S$GLB,, | Performed by: PHARMACIST

## 2017-10-30 PROCEDURE — 99999 PR PBB SHADOW E&M-EST. PATIENT-LVL II: CPT | Mod: PBBFAC,,, | Performed by: DERMATOLOGY

## 2017-10-30 PROCEDURE — 85610 PROTHROMBIN TIME: CPT | Mod: QW,S$GLB,, | Performed by: PHARMACIST

## 2017-10-30 RX ORDER — GABAPENTIN 300 MG/1
CAPSULE ORAL
Refills: 2 | Status: ON HOLD | COMMUNITY
Start: 2017-09-24 | End: 2018-01-10

## 2017-10-30 RX ORDER — DICLOFENAC SODIUM 30 MG/G
GEL TOPICAL
COMMUNITY
Start: 2017-10-09 | End: 2019-01-01

## 2017-10-30 RX ORDER — DOXEPIN HYDROCHLORIDE 50 MG/G
CREAM TOPICAL
COMMUNITY
Start: 2017-10-09 | End: 2018-03-26

## 2017-10-30 NOTE — PROGRESS NOTES
Patient reports increased bruising on arms. No bleeding issues. INR slightly elevated. She reports fluid retention and no greens lately, which may be causing the slightly elevated INR. I will lower her dose of warfarin for today and then re-challenge this dose with closer follow up. Patient was re-educated on situations that would require placing a call to the coumadin clinic, including bleeding or unusual bruising issues, changes in health, diet or medications, upcoming procedures that require warfarin interruption, and missed coumadin dose(s). Patient expressed understanding that avoidance of consistency with these parameters could cause fluctuations in INR, leading to more frequent visits and increase risk of adverse events.

## 2017-10-30 NOTE — PROGRESS NOTES
Subjective:       Patient ID:  Sisi Medel is a 57 y.o. female who presents for   Chief Complaint   Patient presents with    Skin Problem     body, x months, worsens, comes & goes, painful, no tx     Patient is a 56 yo female with hx of ESRD in hemodialysis currently on Plavix, aspirin and warfarin for a-fib presents for bruising on her arms and legs for the past year. Bruising is progressively worsening. Patient states that she does not recall trauma to affected areas. Some of the lesions are tender. States that she also bleeds easily.         Denies personal or family h/o skin cancer    Past Medical History:   Diagnosis Date    Anemia in ESRD (end-stage renal disease) 3/7/2016    Anticoagulant long-term use     Cervical radiculopathy 7/20/2015    CHF (congestive heart failure)     Chronic combined systolic and diastolic heart failure 6/14/2013    EF 20% 2013, improved with Medical therapy, negative PET 2013    Chronic respiratory failure with hypoxia 04/22/2013    On home oxygen at 2-5 liters    Closed fracture of proximal end of right fibula 6/27/2016    Complications due to renal dialysis device, implant, and graft     DDD (degenerative disc disease), lumbar 6/17/2015    Dependence on renal dialysis     ESRD on hemodialysis 2/23/2016    Essential hypertension     Gout     Lateral meniscal tear 5/31/2016    Lumbar stenosis 6/17/2015    Pacemaker     Paroxysmal atrial fibrillation 5/16/2014    Not on anticoagulation    Peripheral neuropathy 11/13/2013    Personal history of gastric ulcer 3/19/2013    Sarcoidosis, lung 2009    Diagnosed in 2009 with ocular manifestation, on 4L home O2 and PO steroids    Secondary pulmonary hypertension 4/7/2015    Seizure disorder 3/19/2013    Seizures     Shingles 11/13/2013    Thyroid disease      Review of Systems   Constitutional: Negative for fever, chills, weight loss, weight gain, fatigue and malaise.   Skin: Positive for  activity-related sunscreen use. Negative for daily sunscreen use and recent sunburn.   Hematologic/Lymphatic: Bruises/bleeds easily.        Objective:    Physical Exam   Constitutional: She appears well-developed and well-nourished. No distress.   Neurological: She is alert and oriented to person, place, and time. She is not disoriented.   Psychiatric: She has a normal mood and affect.   Skin:   Areas Examined (abnormalities noted in diagram):   Head / Face Inspection Performed  Neck Inspection Performed  Chest / Axilla Inspection Performed  Back Inspection Performed  RUE Inspected  LUE Inspection Performed  RLE Inspected  LLE Inspection Performed              Diagram Legend     Erythematous scaling macule/papule c/w actinic keratosis       Vascular papule c/w angioma      Pigmented verrucoid papule/plaque c/w seborrheic keratosis      Yellow umbilicated papule c/w sebaceous hyperplasia      Irregularly shaped tan macule c/w lentigo     1-2 mm smooth white papules consistent with Milia      Movable subcutaneous cyst with punctum c/w epidermal inclusion cyst      Subcutaneous movable cyst c/w pilar cyst      Firm pink to brown papule c/w dermatofibroma      Pedunculated fleshy papule(s) c/w skin tag(s)      Evenly pigmented macule c/w junctional nevus     Mildly variegated pigmented, slightly irregular-bordered macule c/w mildly atypical nevus      Flesh colored to evenly pigmented papule c/w intradermal nevus       Pink pearly papule/plaque c/w basal cell carcinoma      Erythematous hyperkeratotic cursted plaque c/w SCC      Surgical scar with no sign of skin cancer recurrence      Open and closed comedones      Inflammatory papules and pustules      Verrucoid papule consistent consistent with wart     Erythematous eczematous patches and plaques     Dystrophic onycholytic nail with subungual debris c/w onychomycosis     Umbilicated papule    Erythematous-base heme-crusted tan verrucoid plaque consistent with  inflamed seborrheic keratosis     Erythematous Silvery Scaling Plaque c/w Psoriasis     See annotation    10/2/2017  Platelets 10/2: wnl   INR 3.1  Assessment / Plan:        Purpura  Pt is on multiple blood thinners but her bruising seems excessive  Pt denies trauma/strenuous activity/massage/etc that could have resulted in this bruising  -     Ambulatory Referral to Hematology / Oncology for further work-up         Return for to Heme/Onc for further workup.

## 2017-11-01 ENCOUNTER — TELEPHONE (OUTPATIENT)
Dept: HEMATOLOGY/ONCOLOGY | Facility: CLINIC | Age: 57
End: 2017-11-01

## 2017-11-01 ENCOUNTER — CLINICAL SUPPORT (OUTPATIENT)
Dept: ELECTROPHYSIOLOGY | Facility: CLINIC | Age: 57
End: 2017-11-01
Payer: MEDICARE

## 2017-11-01 DIAGNOSIS — Z95.0 CARDIAC PACEMAKER IN SITU: ICD-10-CM

## 2017-11-01 DIAGNOSIS — I44.2 CHB (COMPLETE HEART BLOCK): ICD-10-CM

## 2017-11-01 DIAGNOSIS — I48.21 PERMANENT ATRIAL FIBRILLATION: ICD-10-CM

## 2017-11-01 PROCEDURE — 93296 REM INTERROG EVL PM/IDS: CPT | Mod: S$GLB,,, | Performed by: INTERNAL MEDICINE

## 2017-11-01 PROCEDURE — 93294 REM INTERROG EVL PM/LDLS PM: CPT | Mod: S$GLB,,, | Performed by: INTERNAL MEDICINE

## 2017-11-03 ENCOUNTER — INITIAL CONSULT (OUTPATIENT)
Dept: HEMATOLOGY/ONCOLOGY | Facility: CLINIC | Age: 57
End: 2017-11-03
Payer: MEDICARE

## 2017-11-03 VITALS
DIASTOLIC BLOOD PRESSURE: 93 MMHG | TEMPERATURE: 98 F | SYSTOLIC BLOOD PRESSURE: 123 MMHG | HEIGHT: 68 IN | BODY MASS INDEX: 42.89 KG/M2 | WEIGHT: 283 LBS | HEART RATE: 70 BPM | RESPIRATION RATE: 18 BRPM | OXYGEN SATURATION: 98 %

## 2017-11-03 DIAGNOSIS — T14.8XXA BRUISING: Primary | ICD-10-CM

## 2017-11-03 DIAGNOSIS — Z99.2 ESRD ON DIALYSIS: ICD-10-CM

## 2017-11-03 DIAGNOSIS — D69.2 PURPURA: ICD-10-CM

## 2017-11-03 DIAGNOSIS — N18.6 ESRD ON DIALYSIS: ICD-10-CM

## 2017-11-03 DIAGNOSIS — E66.01 MORBID OBESITY WITH BMI OF 40.0-44.9, ADULT: Chronic | ICD-10-CM

## 2017-11-03 PROCEDURE — 99204 OFFICE O/P NEW MOD 45 MIN: CPT | Mod: GC,S$GLB,, | Performed by: INTERNAL MEDICINE

## 2017-11-03 PROCEDURE — 99999 PR PBB SHADOW E&M-EST. PATIENT-LVL IV: CPT | Mod: PBBFAC,GC,,

## 2017-11-03 NOTE — PROGRESS NOTES
Subjective:       Patient ID: Sisi Medel is a 57 y.o. female.    Chief Complaint: No chief complaint on file.    Ms. Medel is a 57 year old female with history of ESRD on HD for the past year, sarcoidosis on home O2 and steroids, pulmonary hypertension, combined systolic and diastolic CHF, p-Afib on chronic anticoagulation, morbid obesity, YOANDY, hypothyroidism, seizure disorder, who presents for excessive bruising. She has been on dual antiplatelet therapy in addition to long time coumadin. Over the past two months she has noted an increase in bruises, specifically over her upper extremities. She denies trauma. Today these areas are not tender or raised. She denies bleeding so no melena, hematochezia, hematemesis or hemoptysis. Recently her coumadin was 3.1 and dose was adjusted for lower INR target. Two months ago she has started nortriptyline and diclofenac gel for polyneuropathy.      Past Medical History:  3/7/2016: Anemia in ESRD (end-stage renal disease)  7/20/2015: Cervical radiculopathy  6/14/2013: Chronic combined systolic and diastolic heart *      Comment: EF 20% 2013, improved with Medical therapy,                negative PET 2013 04/22/2013: Chronic respiratory failure with hypoxia      Comment: On home oxygen at 2-5 liters  6/27/2016: Closed fracture of proximal end of right fibula  No date: Complications due to renal dialysis device, im*  6/17/2015: DDD (degenerative disc disease), lumbar  2/23/2016: ESRD on hemodialysis  No date: Essential hypertension  No date: Gout  5/31/2016: Lateral meniscal tear  6/17/2015: Lumbar stenosis  No date: Pacemaker  5/16/2014: Paroxysmal atrial fibrillation  11/13/2013: Peripheral neuropathy  3/19/2013: Personal history of gastric ulcer  2009: Sarcoidosis, lung      Comment: Diagnosed in 2009 with ocular manifestation,                on 4L home O2 and PO steroids  4/7/2015: Secondary pulmonary hypertension  3/19/2013: Seizure disorder  11/13/2013:  Shingles  No date: Thyroid disease    Past Surgical History:  CARDIAC PACEMAKER PLACEMENT   SECTION      Comment: x2  OTHER SURGICAL HISTORY      Comment: loop recorder implant  stent to fistula  VASCULAR SURGERY      Comment: fistula to L upper arm     Review of patient's family history indicates:    Kidney failure                 Mother                    Hypertension                   Mother                    Diabetes                       Mother                    Coronary artery disease        Father                    Hypertension                   Father                    Diabetes                       Father                    Lupus                          Sister                    Social History    Marital status: Single             Social History Main Topics    Smoking status: Former Smoker                                                                Packs/day: 0.50      Years: 10.00          Types: Cigarettes       Quit date: 1994    Smokeless tobacco: Never Used                        Alcohol use:social    Drug use: No                                             Review of Systems   Constitutional: Negative for diaphoresis and fatigue.   HENT: Negative for congestion and trouble swallowing.    Eyes: Negative for photophobia.   Respiratory: Positive for shortness of breath. Negative for cough.    Cardiovascular: Negative for chest pain and leg swelling.   Gastrointestinal: Negative for abdominal distention, abdominal pain and blood in stool.   Genitourinary: Negative for hematuria.   Musculoskeletal: Positive for arthralgias. Negative for back pain and joint swelling.   Skin: Negative for color change and pallor.        Bruising over entire body, primarily upper extremities primarily     Neurological: Negative for light-headedness and headaches.   Hematological: Negative for adenopathy. Bruises/bleeds easily.   Psychiatric/Behavioral: Negative for agitation and behavioral problems.        Objective:      Physical Exam   Constitutional: She is oriented to person, place, and time. She appears well-developed.   HENT:   Mouth/Throat: Oropharynx is clear and moist.   Eyes: Pupils are equal, round, and reactive to light.   Cardiovascular: Normal rate and regular rhythm.    Pulmonary/Chest:   Decreased bs bilateral bases   Abdominal: Soft. Bowel sounds are normal.   Musculoskeletal: She exhibits edema.   Lymphadenopathy:     She has no cervical adenopathy.   Neurological: She is alert and oriented to person, place, and time.   Skin:   Ecchymosis over upper extremities, lower extremities. Fewer lesions over torso    Psychiatric: She has a normal mood and affect. Her behavior is normal.       Assessment:       1. Bruising        Plan:   Ms. Medel has noted excessive bruising of late. She is on dual antiplatelet therapy as well as chronic anticoagulation with coumadin. These medicationshinder platelet and coagulation function to reduce the risk of blood clots and cardiovascular related events which she is at high risk for. In addition she has been on HD which also affects platelet function. Chronic steroid use may cause skin fragility and therefor increase the risk of purpura given background of antiplatelet and anticoagulation therapy. She reports no evidence of potential life threatening bleeding. It therefore seems that her purpura is more of a cosmetic problem rather than a significant bleeding issue at this time.  She does follow with coumadin clinic and recently her dose has been adjusted to lower inr as it was slightly above goal. Would continue to follow up with coumadin clinic and may continue current therapy as deemed necessary by her treating physicians as long as risk does not out weight benefit.    No further work up indicated at this time.  She may follow up prn.   All questions answered to satisfaction.  Patient seen with Dr. Ralf Romeo MD   Pager [phone]    ATTENDING NOTE,  ONCOLOGY CLINIC    As above; patient seen, interviewed, and examined.  I suspect her ecchymoses are a combination of her ESRD, antiplatelet agents, and warfarin.  I do not see a reason for further workup. Would advise to keep her INR at the lower end of the therapeutic range.  Her multiple questions were answered to her satisfaction.  Please reconsult prn of there are any specific questions you would like our Service to address.  We will sign off for now.

## 2017-11-07 ENCOUNTER — OFFICE VISIT (OUTPATIENT)
Dept: PODIATRY | Facility: CLINIC | Age: 57
End: 2017-11-07
Payer: MEDICARE

## 2017-11-07 VITALS
HEIGHT: 68 IN | BODY MASS INDEX: 42.89 KG/M2 | DIASTOLIC BLOOD PRESSURE: 67 MMHG | WEIGHT: 283 LBS | SYSTOLIC BLOOD PRESSURE: 89 MMHG | HEART RATE: 69 BPM

## 2017-11-07 DIAGNOSIS — M25.572 LEFT ANKLE PAIN, UNSPECIFIED CHRONICITY: ICD-10-CM

## 2017-11-07 DIAGNOSIS — R60.0 BILATERAL LOWER EXTREMITY EDEMA: Primary | ICD-10-CM

## 2017-11-07 DIAGNOSIS — B35.1 ONYCHOMYCOSIS DUE TO DERMATOPHYTE: ICD-10-CM

## 2017-11-07 DIAGNOSIS — I73.9 PVD (PERIPHERAL VASCULAR DISEASE): ICD-10-CM

## 2017-11-07 PROCEDURE — 99213 OFFICE O/P EST LOW 20 MIN: CPT | Mod: 25,S$GLB,, | Performed by: PODIATRIST

## 2017-11-07 PROCEDURE — 99499 UNLISTED E&M SERVICE: CPT | Mod: S$GLB,,, | Performed by: PODIATRIST

## 2017-11-07 PROCEDURE — 11721 DEBRIDE NAIL 6 OR MORE: CPT | Mod: Q9,S$GLB,, | Performed by: PODIATRIST

## 2017-11-07 PROCEDURE — 99999 PR PBB SHADOW E&M-EST. PATIENT-LVL III: CPT | Mod: PBBFAC,,, | Performed by: PODIATRIST

## 2017-11-07 RX ORDER — TRAMADOL HYDROCHLORIDE 50 MG/1
50 TABLET ORAL EVERY 6 HOURS PRN
Qty: 30 TABLET | Refills: 0 | Status: ON HOLD | OUTPATIENT
Start: 2017-11-07 | End: 2017-12-08 | Stop reason: HOSPADM

## 2017-11-07 NOTE — PROGRESS NOTES
Subjective:      Patient ID: Sisi Medel is a 57 y.o. female.    Chief Complaint: PCP (Ivester 07/31/2017) and Follow-up (6 week f/u)    Sisi is a 57 y.o. female who presents to the clinic for evaluation and treatment of high risk feet. Sisi has a past medical history of Anemia in ESRD (end-stage renal disease) (3/7/2016); Anticoagulant long-term use; Cervical radiculopathy (7/20/2015); CHF (congestive heart failure); Chronic combined systolic and diastolic heart failure (6/14/2013); Chronic respiratory failure with hypoxia (04/22/2013); Closed fracture of proximal end of right fibula (6/27/2016); Complications due to renal dialysis device, implant, and graft; DDD (degenerative disc disease), lumbar (6/17/2015); Dependence on renal dialysis; ESRD on hemodialysis (2/23/2016); Essential hypertension; Gout; Lateral meniscal tear (5/31/2016); Lumbar stenosis (6/17/2015); Pacemaker; Paroxysmal atrial fibrillation (5/16/2014); Peripheral neuropathy (11/13/2013); Personal history of gastric ulcer (3/19/2013); Sarcoidosis, lung (2009); Secondary pulmonary hypertension (4/7/2015); Seizure disorder (3/19/2013); Seizures; Shingles (11/13/2013); and Thyroid disease. The patient's chief complaint is follow up of multiple previous wounds on the right foot/leg which healed at last visit. She has been trimming her nails herself and causing wounds to the tips of both great toes. Also, nails are cut very jagged with sharp pointy edges. She also relates painful left ankle. Unsure of why this is painful. Denies injury. Has not tried to treat. Inquiring about treatment options.  This patient has documented high risk feet requiring routine maintenance secondary to peripheral neuropathy.    PCP: Carlos A Caputo MD    Date Last Seen by PCP:   Chief Complaint   Patient presents with    PCP     Ivester 07/31/2017    Follow-up     6 week f/u         Current shoe gear:  Affected Foot: Football, UNNA boot,  darco     Unaffected Foot: Slip-on shoes    Last encounter in this department: Visit date not found    Hemoglobin A1C   Date Value Ref Range Status   09/06/2017 6.8 (H) 4.0 - 5.6 % Final     Comment:     According to ADA guidelines, hemoglobin A1c <7.0% represents  optimal control in non-pregnant diabetic patients. Different  metrics may apply to specific patient populations.   Standards of Medical Care in Diabetes-2016.  For the purpose of screening for the presence of diabetes:  <5.7%     Consistent with the absence of diabetes  5.7-6.4%  Consistent with increasing risk for diabetes   (prediabetes)  >or=6.5%  Consistent with diabetes  Currently, no consensus exists for use of hemoglobin A1c  for diagnosis of diabetes for children.  This Hemoglobin A1c assay has significant interference with fetal   hemoglobin   (HbF). The results are invalid for patients with abnormal amounts of   HbF,   including those with known Hereditary Persistence   of Fetal Hemoglobin. Heterozygous hemoglobin variants (HbAS, HbAC,   HbAD, HbAE, HbA2) do not significantly interfere with this assay;   however, presence of multiple variants in a sample may impact the %   interference.     05/17/2016 5.7 4.5 - 6.2 % Final   10/15/2015 6.7 (H) 4.5 - 6.2 % Final     Past Medical History:   Diagnosis Date    Anemia in ESRD (end-stage renal disease) 3/7/2016    Anticoagulant long-term use     Cervical radiculopathy 7/20/2015    CHF (congestive heart failure)     Chronic combined systolic and diastolic heart failure 6/14/2013    EF 20% 2013, improved with Medical therapy, negative PET 2013    Chronic respiratory failure with hypoxia 04/22/2013    On home oxygen at 2-5 liters    Closed fracture of proximal end of right fibula 6/27/2016    Complications due to renal dialysis device, implant, and graft     DDD (degenerative disc disease), lumbar 6/17/2015    Dependence on renal dialysis     ESRD on hemodialysis 2/23/2016    Essential  hypertension     Gout     Lateral meniscal tear 2016    Lumbar stenosis 2015    Pacemaker     Paroxysmal atrial fibrillation 2014    Not on anticoagulation    Peripheral neuropathy 2013    Personal history of gastric ulcer 3/19/2013    Sarcoidosis, lung 2009    Diagnosed in  with ocular manifestation, on 4L home O2 and PO steroids    Secondary pulmonary hypertension 2015    Seizure disorder 3/19/2013    Seizures     Shingles 2013    Thyroid disease        Past Surgical History:   Procedure Laterality Date    ABDOMINAL SURGERY      CARDIAC PACEMAKER PLACEMENT       SECTION      x2    OTHER SURGICAL HISTORY      loop recorder implant    stent to fistula      VASCULAR SURGERY      fistula to L upper arm        Family History   Problem Relation Age of Onset    Kidney failure Mother     Hypertension Mother     Diabetes Mother     Coronary artery disease Father     Hypertension Father     Diabetes Father     Lupus Sister     Diabetes Maternal Grandmother     Colon cancer Neg Hx     Ovarian cancer Neg Hx     Breast cancer Neg Hx        Social History     Social History    Marital status: Single     Spouse name: N/A    Number of children: N/A    Years of education: N/A     Social History Main Topics    Smoking status: Former Smoker     Packs/day: 0.50     Years: 10.00     Types: Cigarettes     Quit date: 1994    Smokeless tobacco: Never Used    Alcohol use No      Comment: rarely    Drug use: No    Sexual activity: Not Currently     Other Topics Concern    Not on file     Social History Narrative    Lives with boyfriend/partner who helps with her care.            Current Outpatient Prescriptions   Medication Sig Dispense Refill    ACZONE 5 % topical gel Apply to face every morning  2    ammonium lactate (LAC-HYDRIN) 12 % lotion Apply topically as needed (to legs).   2    atorvastatin (LIPITOR) 80 MG tablet TAKE 1 TABLET(80 MG) BY MOUTH  EVERY DAY (Patient taking differently: TAKE 1 TABLET(80 MG) BY MOUTH EVERY NIGHT) 90 tablet 3    cholecalciferol, vitamin D3, 1,000 unit capsule Take 1 capsule (1,000 Units total) by mouth once daily. (Patient taking differently: Take 1,000 Units by mouth every evening. )  0    clopidogrel (PLAVIX) 75 mg tablet 75 mg.      diclofenac sodium (SOLARAZE) 3 % gel       digoxin (LANOXIN) 125 mcg tablet Take 1 tablet (0.125 mg total) by mouth every other day. 15 tablet 1    doxepin (ZONALON) 5 % cream       econazole nitrate 1 % cream APPLY TOPICALLY TWICE DAILY AS NEEDED FOR FUNGAL SKIN INFECTIONS. USE FOR 2 TO 4 WEEKS 30 g 0    fludrocortisone (FLORINEF) 0.1 mg Tab Take 100 mcg by mouth 2 (two) times daily.      gabapentin (NEURONTIN) 300 MG capsule TK ONE C PO  TID  2    ketorolac 0.5% (ACULAR) 0.5 % Drop instill one drop into both eyes every morning  3    levetiracetam (KEPPRA) 500 MG Tab Take 1 tablet (500 mg total) by mouth 2 (two) times daily. 60 tablet 5    midodrine (PROAMATINE) 5 MG Tab Take 2 tablets (10 mg total) by mouth every Mon, Wed, Fri, Sun. 32 tablet 11    nortriptyline (PAMELOR) 25 MG capsule Take 1 capsule (25 mg total) by mouth every evening. 30 capsule 11    omeprazole (PRILOSEC) 20 MG capsule TAKE 1 CAPSULE(20 MG) BY MOUTH EVERY DAY 90 capsule 3    polyethylene glycol (GLYCOLAX) 17 gram/dose powder Take 17 g by mouth once daily. Dissolve in juice. 510 g 0    prednisoLONE acetate (PRED FORTE) 1 % DrpS INSTILL ONE DROP INTO  LEFT EYE THREE TIMES A DAY  3    predniSONE (DELTASONE) 10 MG tablet TAKE 1 TABLET BY MOUTH EVERY DAY WITH BREAKFAST 90 tablet 0    RENVELA 800 mg Tab TAKE 1 TABLET BY MOUTH THREE TIMES DAILY WITH MEALS 90 tablet 0    SENSIPAR 30 mg Tab       tizanidine 4 mg Cap TAKE 1 CAPSULE BY MOUTH DAILY AS NEEDED FOR MUSCLE SPASM 90 capsule 0    warfarin (COUMADIN) 5 MG tablet Take 1 tablet (5 mg total) by mouth Daily. Per [facility] Clinic (Patient taking differently: Take  5 mg by mouth every evening. Per [facility] Clinic) 90 tablet 3    traMADol (ULTRAM) 50 mg tablet Take 1 tablet (50 mg total) by mouth every 6 (six) hours as needed for Pain. 30 tablet 0     No current facility-administered medications for this visit.        Review of patient's allergies indicates:   Allergen Reactions    Bactrim [sulfamethoxazole-trimethoprim] Other (See Comments)     Causes renal failure    Nsaids (non-steroidal anti-inflammatory drug) Other (See Comments)     Renal failure       Review of Systems   Constitution: Negative for chills and fever.   Cardiovascular: Positive for leg swelling. Negative for chest pain and claudication.   Respiratory: Negative for cough and shortness of breath.    Skin: Positive for dry skin, nail changes, poor wound healing and suspicious lesions.   Musculoskeletal:        Ankle pain left   Gastrointestinal: Negative for nausea and vomiting.   Neurological: Positive for numbness and paresthesias.   Psychiatric/Behavioral: Negative for altered mental status.           Objective:      Physical Exam   Constitutional: She is oriented to person, place, and time. She appears well-developed and well-nourished.   HENT:   Head: Normocephalic.   Cardiovascular: Intact distal pulses.    Pulses:       Dorsalis pedis pulses are 1+ on the right side, and 1+ on the left side.        Posterior tibial pulses are 1+ on the right side, and 1+ on the left side.   CRT < 3 sec to tips of toes. 2+ edema noted to b/l LE. No vericosities noted to b/l LEs.      Pulmonary/Chest: No respiratory distress.   Musculoskeletal:   Gastrocnemius equinus noted to b/l ankles with decreased DF noted on exam. MMT 5/5 in DF/PF/Inv/Ev resistance with no reproduction of pain in any direction. Passive range of motion of ankle and pedal joints is painless. Adequate pedal joint ROM.     + diffuse pain with palpation of left anterior ankle along extensor tendons. No focal area of tenderness. No pain with DF  resistance left ankle. Mild pain with palpation of medial and lateral collateral ankle ligaments left ankle. Negative anterior drawer sign left ankle. No ecchymosis noted to left ankle circumferentially.    Feet:   Right Foot:   Protective Sensation: 10 sites tested. 0 sites sensed.   Skin Integrity: Positive for ulcer, skin breakdown and dry skin.   Left Foot:   Protective Sensation: 10 sites tested. 0 sites sensed.   Skin Integrity: Positive for dry skin. Negative for ulcer or skin breakdown.   Neurological: She is alert and oriented to person, place, and time. She has normal strength. A sensory deficit is present.   Light touch, proprioception, and sharp/dull sensation are all intact bilaterally. Protective threshold with the Damascus-Wienstein monofilament is completely diminished bilaterally.      Skin: Skin is warm, dry and intact. No erythema.   Generalized hyperpigmentation to ankle and lower 1/3 of R leg consistent with hemosiderin deposits. Mild dermatitis R lower 1/3 leg. Overall skin is thin, shiny, atrophic to b/l LEs. Loss of pedal hair b/l. Toes are cool to touch b/l.     All areas of previous wounds remain healed. No current open wounds noted.     New superficial abrasions noted to distal tips of b/l hallux down to level of dermis at tip of nail. No active bleeding. No SOI.     Diffuse xerosis noted to plantar aspects of b/l foot/heels.      Psychiatric: She has a normal mood and affect. Her behavior is normal. Judgment and thought content normal.   Vitals reviewed.            Assessment:       Encounter Diagnoses   Name Primary?    Bilateral lower extremity edema Yes    PVD (peripheral vascular disease)     Onychomycosis due to dermatophyte     Left ankle pain, unspecified chronicity          Plan:       Sisi was seen today for pcp and follow-up.    Diagnoses and all orders for this visit:    Bilateral lower extremity edema    PVD (peripheral vascular disease)    Onychomycosis due to  dermatophyte    Left ankle pain, unspecified chronicity    Other orders  -     traMADol (ULTRAM) 50 mg tablet; Take 1 tablet (50 mg total) by mouth every 6 (six) hours as needed for Pain.      I counseled the patient on her conditions, their implications and medical management.    With patient's permission, nails were aggressively reduced and debrided 1,2,3,4, 5 R and 1,2, 3,4,5 L and filed to their soft tissue attachment mechanically and with electric , removing all offending nail and debris. Patient tolerated this well and no blood was drawn. Patient reports relief following the procedure.     Rx tramadol 50mg q6h for pain.  Advised to take as directed only when pain is severe.     Discussed proper and consistent elevation of lower extremities, above the level of the heart, while at rest, to help control/improve edema.     Long discussion with patient regarding appropriate, supportive and comfortable shoes. Recommended [employer] style shoe brands with adequate arch supports to alleviate abnormal pressure and improve stability of foot while walking. Avoid flat shoes and barefoot walking as these will exacerbate or worsen symptoms.     No focal area of tenderness or injury noted. Likely pain from diffuse edema. Will monitor. I warned patient of signs and symptoms of infection including redness, drainage, purulence, odor, streaking, fever, chills, etc and I advised her to seek medical attention (ER or urgent car) if these symptoms arise.     F/u 9 weeks, sooner PRN

## 2017-11-09 ENCOUNTER — TELEPHONE (OUTPATIENT)
Dept: INTERNAL MEDICINE | Facility: CLINIC | Age: 57
End: 2017-11-09

## 2017-11-09 NOTE — TELEPHONE ENCOUNTER
"----- Message from Kayy Avalos sent at 11/8/2017  4:43 PM CST -----  Contact: [facility]/[phone]. Fax # [phone]      RX request - refill or new RX.  Is this a refill or new RX:  new  RX name and strength: tizanidine 4 mg Cap  Directions:  TAKE 1 CAPSULE BY MOUTH DAILY AS NEEDED FOR MUSCLE SPASM - Oral  Is this a 30 day or 90 day RX:    Pharmacy name and phone # (DON'T enter "on file" or "in chart"): [facility]. Ph # [phone]. Fax # [phone]  Comments:  Please call and advise.     Thank you!!!        "

## 2017-11-15 DIAGNOSIS — D86.0 SARCOIDOSIS OF LUNG: ICD-10-CM

## 2017-11-15 RX ORDER — PREDNISONE 10 MG/1
10 TABLET ORAL DAILY
Qty: 90 TABLET | Refills: 3 | Status: ON HOLD | OUTPATIENT
Start: 2017-11-15 | End: 2018-01-03 | Stop reason: HOSPADM

## 2017-11-16 NOTE — PROGRESS NOTES
Patient called to reschedule 11/20 coumadin clinic appointment, it was rescheduled to 11/21 at lab 2nd floor due to dialysis schedule change

## 2017-11-20 DIAGNOSIS — D86.0 SARCOIDOSIS OF LUNG: ICD-10-CM

## 2017-11-21 ENCOUNTER — TELEPHONE (OUTPATIENT)
Dept: INTERNAL MEDICINE | Facility: CLINIC | Age: 57
End: 2017-11-21

## 2017-11-21 RX ORDER — PREDNISONE 10 MG/1
TABLET ORAL
Qty: 90 TABLET | Refills: 0 | Status: SHIPPED | OUTPATIENT
Start: 2017-11-21 | End: 2018-03-26 | Stop reason: DRUGHIGH

## 2017-11-21 NOTE — TELEPHONE ENCOUNTER
----- Message from Pedro Luis Woodson sent at 11/21/2017  3:31 PM CST -----  Contact: Anai [phone]  RN [phone]  Pharmacy would like a call from the office in regards to medication  gabapentin (NEURONTIN) 300 MG capsule . Please call and advise , Thanks

## 2017-11-21 NOTE — TELEPHONE ENCOUNTER
Should patient be taking one Gabapentin daily instead of 2. Spoke with pharmacy recommmends patient take 1 due to patient being on dialysis. Please advise

## 2017-11-22 NOTE — TELEPHONE ENCOUNTER
I last saw this patient in 4/2017; her medications have been changed by other providers since then.     On chart review, she doesn't have an active prescription for gabapentin, because this med was stopped by her neurologist Dr Lester and she was switched to nortriptyline. Please let pharmacy know, and check with patient to see what medications she has been taking (gabapentin and/or nortriptyline).  She has follow up with Dr Lester next month.

## 2017-11-27 ENCOUNTER — HOSPITAL ENCOUNTER (OUTPATIENT)
Dept: PULMONOLOGY | Facility: CLINIC | Age: 57
Discharge: HOME | End: 2017-11-27
Payer: MEDICARE

## 2017-11-27 ENCOUNTER — OFFICE VISIT (OUTPATIENT)
Dept: PULMONOLOGY | Facility: CLINIC | Age: 57
End: 2017-11-27
Payer: MEDICARE

## 2017-11-27 ENCOUNTER — HOSPITAL ENCOUNTER (OUTPATIENT)
Dept: RADIOLOGY | Facility: HOSPITAL | Age: 57
Discharge: HOME | End: 2017-11-27
Attending: INTERNAL MEDICINE
Payer: MEDICARE

## 2017-11-27 VITALS
BODY MASS INDEX: 43.95 KG/M2 | WEIGHT: 290 LBS | OXYGEN SATURATION: 95 % | RESPIRATION RATE: 16 BRPM | HEART RATE: 70 BPM | HEIGHT: 68 IN | DIASTOLIC BLOOD PRESSURE: 80 MMHG | SYSTOLIC BLOOD PRESSURE: 124 MMHG

## 2017-11-27 DIAGNOSIS — K21.9 GASTROESOPHAGEAL REFLUX DISEASE WITHOUT ESOPHAGITIS: ICD-10-CM

## 2017-11-27 DIAGNOSIS — D86.0 PULMONARY SARCOIDOSIS: Chronic | ICD-10-CM

## 2017-11-27 DIAGNOSIS — Z99.2 ESRD ON HEMODIALYSIS: Chronic | ICD-10-CM

## 2017-11-27 DIAGNOSIS — J98.4 CRLD (CHRONIC RESTRICTIVE LUNG DISEASE): ICD-10-CM

## 2017-11-27 DIAGNOSIS — I50.42 CHRONIC COMBINED SYSTOLIC AND DIASTOLIC HEART FAILURE: Chronic | ICD-10-CM

## 2017-11-27 DIAGNOSIS — E66.01 MORBID OBESITY WITH BMI OF 40.0-44.9, ADULT: Chronic | ICD-10-CM

## 2017-11-27 DIAGNOSIS — N18.6 ESRD ON HEMODIALYSIS: Chronic | ICD-10-CM

## 2017-11-27 DIAGNOSIS — J96.11 CHRONIC RESPIRATORY FAILURE WITH HYPOXIA: Chronic | ICD-10-CM

## 2017-11-27 DIAGNOSIS — D86.0 PULMONARY SARCOIDOSIS: Primary | Chronic | ICD-10-CM

## 2017-11-27 LAB
PRE FEV1 FVC: 86
PRE FEV1: 1.37
PRE FVC: 1.6
PREDICTED FEV1 FVC: 79
PREDICTED FEV1: 2.81
PREDICTED FVC: 3.52

## 2017-11-27 PROCEDURE — 99214 OFFICE O/P EST MOD 30 MIN: CPT | Mod: 25,S$GLB,, | Performed by: INTERNAL MEDICINE

## 2017-11-27 PROCEDURE — 99999 PR PBB SHADOW E&M-EST. PATIENT-LVL III: CPT | Mod: PBBFAC,,, | Performed by: INTERNAL MEDICINE

## 2017-11-27 PROCEDURE — 71020 XR CHEST PA AND LATERAL: CPT | Mod: 26,,, | Performed by: RADIOLOGY

## 2017-11-27 PROCEDURE — 99499 UNLISTED E&M SERVICE: CPT | Mod: S$GLB,,, | Performed by: INTERNAL MEDICINE

## 2017-11-27 PROCEDURE — 71020 XR CHEST PA AND LATERAL: CPT | Mod: TC

## 2017-11-27 NOTE — PROGRESS NOTES
Subjective:       Patient ID: Sisi Medel is a 57 y.o. female.    Chief Complaint: Sarcoidosis    HPI HISTORY OF PRESENT ILLNESS:  Mrs. Medel is a 57-year-old former smoker who   comes for an interval assessment.  Her most recent previous visit here was in   June.  She feels that her respiratory status is largely unchanged during these   past several months.  She has been having a cough, which is currently   nonproductive.  This seems to occur more at night.  She has not recognized any   associated upper GI symptoms.  She currently takes Prilosec for control of   gastroesophageal reflux and feels that this medication has been effective.    Mrs. Medel has end-stage renal disease and is supported on hemodialysis.  She   recently had her time on dialysis increased in an effort to remove additional   fluid.  She has not had any recent problems with an unstable blood pressure.    This had been a problem for a period of time, but seems resolved since she began   treatment with midodrine.    Mrs. Medel has not had any symptoms to suggest extra pulmonary disease   activity related to sarcoidosis.  She remains on a 10 mg maintenance dose of   prednisone for treatment of this condition.  She also continues to use oxygen at   2 to 4 liters per minute due to chronic respiratory failure.    DATA:  I have reviewed the results of pulmonary function studies done earlier   today.  The forced vital capacity is 1.60 L, which is 45% of predicted.  The   FEV1 is 1.37 L, 49% of predicted.  The ratio of these values is 85%.  The   diffusion capacity is 9.6, which is 47% of predicted.  When today's study is   compared to previous studies dating back over the past 12-15 months, the current   spirometry values are modestly lower.  The diffusion capacity remains   unchanged.      KARIE/RENUKA  dd: 11/27/2017 19:42:30 (CST)  td: 11/28/2017 18:55:23 (CST)  Doc ID   #[id number]  Job ID #[id number]    CC:       Review of Systems    Constitutional: Positive for weight gain. Negative for fever and fatigue.   HENT: Negative for postnasal drip, sinus pressure, voice change and congestion.    Respiratory: Positive for cough, shortness of breath and dyspnea on extertion. Negative for sputum production and wheezing.    Cardiovascular: Negative for chest pain and leg swelling.   Genitourinary: Negative for difficulty urinating.   Musculoskeletal: Positive for arthralgias. Negative for back pain.   Skin: Negative for rash.   Gastrointestinal: Negative for abdominal pain and acid reflux.   Neurological: Negative for dizziness and weakness.   Hematological: Negative for adenopathy. Excessive bruising.       Objective:      Physical Exam   Constitutional: She is oriented to person, place, and time. She appears well-developed. She is obese.   HENT:   Head: Normocephalic.   Nose: Nose normal.   Mouth/Throat: No oropharyngeal exudate.   Neck: Normal range of motion. No JVD present. No tracheal deviation present. No thyromegaly present.   Cardiovascular: Normal rate, regular rhythm and normal heart sounds.    No murmur heard.  Pulmonary/Chest: Symmetric chest wall expansion. No stridor. She has no wheezes. She has no rhonchi. She has rales (few rales posteriorly). She exhibits no tenderness.   Abdominal: Soft. There is no tenderness.   Musculoskeletal: She exhibits edema (mild LE edema).   Lymphadenopathy:     She has no cervical adenopathy.   Neurological: She is alert and oriented to person, place, and time.   Skin: Skin is warm and dry. No rash noted. No erythema. Nails show no clubbing.   Psychiatric: She has a normal mood and affect.   Vitals reviewed.    Personal Diagnostic Review    No flowsheet data found.      Assessment:       1. Pulmonary sarcoidosis    2. Morbid obesity with BMI of 40.0-44.9, adult    3. ESRD on hemodialysis    4. CRLD (chronic restrictive lung disease)    5. Chronic respiratory failure with hypoxia    6. Chronic combined  systolic and diastolic heart failure    7. Gastroesophageal reflux disease without esophagitis        Outpatient Encounter Prescriptions as of 11/27/2017   Medication Sig Dispense Refill    ACZONE 5 % topical gel Apply to face every morning  2    ammonium lactate (LAC-HYDRIN) 12 % lotion Apply topically as needed (to legs).   2    atorvastatin (LIPITOR) 80 MG tablet TAKE 1 TABLET(80 MG) BY MOUTH EVERY DAY (Patient taking differently: TAKE 1 TABLET(80 MG) BY MOUTH EVERY NIGHT) 90 tablet 3    cholecalciferol, vitamin D3, 1,000 unit capsule Take 1 capsule (1,000 Units total) by mouth once daily. (Patient taking differently: Take 1,000 Units by mouth every evening. )  0    clopidogrel (PLAVIX) 75 mg tablet 75 mg.      diclofenac sodium (SOLARAZE) 3 % gel       digoxin (LANOXIN) 125 mcg tablet Take 1 tablet (0.125 mg total) by mouth every other day. 15 tablet 1    doxepin (ZONALON) 5 % cream       econazole nitrate 1 % cream APPLY TOPICALLY TWICE DAILY AS NEEDED FOR FUNGAL SKIN INFECTIONS. USE FOR 2 TO 4 WEEKS 30 g 0    fludrocortisone (FLORINEF) 0.1 mg Tab Take 100 mcg by mouth 2 (two) times daily.      gabapentin (NEURONTIN) 300 MG capsule TK ONE C PO  TID  2    ketorolac 0.5% (ACULAR) 0.5 % Drop instill one drop into both eyes every morning  3    levetiracetam (KEPPRA) 500 MG Tab Take 1 tablet (500 mg total) by mouth 2 (two) times daily. 60 tablet 5    midodrine (PROAMATINE) 5 MG Tab Take 2 tablets (10 mg total) by mouth every Mon, Wed, Fri, Sun. 32 tablet 11    nortriptyline (PAMELOR) 25 MG capsule Take 1 capsule (25 mg total) by mouth every evening. 30 capsule 11    omeprazole (PRILOSEC) 20 MG capsule TAKE 1 CAPSULE(20 MG) BY MOUTH EVERY DAY 90 capsule 3    polyethylene glycol (GLYCOLAX) 17 gram/dose powder Take 17 g by mouth once daily. Dissolve in juice. 510 g 0    prednisoLONE acetate (PRED FORTE) 1 % DrpS INSTILL ONE DROP INTO  LEFT EYE THREE TIMES A DAY  3    predniSONE (DELTASONE) 10 MG  tablet Take 1 tablet (10 mg total) by mouth once daily. 90 tablet 3    predniSONE (DELTASONE) 10 MG tablet TAKE 1 TABLET BY MOUTH EVERY DAY WITH BREAKFAST 90 tablet 0    RENVELA 800 mg Tab TAKE 1 TABLET BY MOUTH THREE TIMES DAILY WITH MEALS 90 tablet 0    SENSIPAR 30 mg Tab       tizanidine 4 mg Cap TAKE 1 CAPSULE BY MOUTH DAILY AS NEEDED FOR MUSCLE SPASM 90 capsule 0    traMADol (ULTRAM) 50 mg tablet Take 1 tablet (50 mg total) by mouth every 6 (six) hours as needed for Pain. 30 tablet 0    warfarin (COUMADIN) 5 MG tablet Take 1 tablet (5 mg total) by mouth Daily. Per [facility] (Patient taking differently: Take 5 mg by mouth every evening. Per [facility]) 90 tablet 3    [DISCONTINUED] diclofenac sodium 1 % Gel Apply 2 g topically every 8 (eight) hours as needed. 100 g 0    [DISCONTINUED] omeprazole (PRILOSEC) 20 MG capsule TAKE 1 CAPSULE(20 MG) BY MOUTH EVERY DAY 30 capsule 3    [DISCONTINUED] predniSONE (DELTASONE) 10 MG tablet TAKE 1 TABLET BY MOUTH EVERY DAY WITH BREAKFAST 90 tablet 0    [DISCONTINUED] tizanidine 4 mg Cap TAKE 1 CAPSULE BY MOUTH DAILY AS NEEDED 60 capsule 0    [DISCONTINUED] vancomycin 1 g in dextrose 5 % 250 mL IVPB (ready to mix system)        No facility-administered encounter medications on file as of 11/27/2017.      Orders Placed This Encounter   Procedures    X-Ray Chest PA And Lateral     Standing Status:   Future     Number of Occurrences:   1     Standing Expiration Date:   11/27/2018    Spirometry without Bronchodilator     Standing Status:   Future     Standing Expiration Date:   7/29/2018    DLCO-Carbon Monoxide Diffusing Capacity     Standing Status:   Future     Standing Expiration Date:   7/29/2018     Plan:     CXR (phone report).  Continue present use of Prednisone and nasal O2.  Return visit 3 months with Spirometry and DLCO.

## 2017-11-27 NOTE — TELEPHONE ENCOUNTER
RX discontinued at pt's pharmacy, pt has and active prescription for nortriptyline that was just refilled. Letter for pt's upcoming appointments mailed as reminder.

## 2017-11-29 ENCOUNTER — DOCUMENTATION ONLY (OUTPATIENT)
Dept: TRANSPLANT | Facility: CLINIC | Age: 57
End: 2017-11-29

## 2017-11-29 ENCOUNTER — ANTI-COAG VISIT (OUTPATIENT)
Dept: CARDIOLOGY | Facility: CLINIC | Age: 57
End: 2017-11-29

## 2017-11-29 ENCOUNTER — LAB VISIT (OUTPATIENT)
Dept: LAB | Facility: HOSPITAL | Age: 57
End: 2017-11-29
Payer: MEDICARE

## 2017-11-29 ENCOUNTER — TELEPHONE (OUTPATIENT)
Dept: TRANSPLANT | Facility: CLINIC | Age: 57
End: 2017-11-29

## 2017-11-29 DIAGNOSIS — Z79.01 CURRENT USE OF LONG TERM ANTICOAGULATION: ICD-10-CM

## 2017-11-29 LAB
INR PPP: 2.3
PROTHROMBIN TIME: 23.4 SEC

## 2017-11-29 PROCEDURE — 85610 PROTHROMBIN TIME: CPT

## 2017-11-29 PROCEDURE — 36415 COLL VENOUS BLD VENIPUNCTURE: CPT

## 2017-11-29 NOTE — TELEPHONE ENCOUNTER
I spoke with pt again after talking with Dr Desir and encouraged her to consider nocturnal HD- asked her to ask questions to the nurse and MD at the HD center about it as I agree this is likely the best option to get and keep her euvolemic    Reinforced the need to lay off the salt, fluid and alcohol.

## 2017-11-29 NOTE — PROGRESS NOTES
Received call from Dr Harvey yesterday concerned that pt was volume overloaded-  I called pt to get her HD center information, and she agreed that she needs more volume off    Called [facility] and spoke with her HD nurse Elroy Perez- she reported that pt is woefully noncompliant with her fluid intake and that Dr Desir is aware she is volume overloaded- he has already increased her treatment time to 5 hr 15 min and they will try to fit her in for an extra treatment this week (they have already been doing this every couple of weeks or so)    MUST reinforce need for both sodium and fluid restrictions with patient as it seems the HD center is already doing everything they can to get her euvolemic.

## 2017-11-30 DIAGNOSIS — I50.42 CHRONIC COMBINED SYSTOLIC AND DIASTOLIC HEART FAILURE: ICD-10-CM

## 2017-11-30 RX ORDER — ATORVASTATIN CALCIUM 80 MG/1
TABLET, FILM COATED ORAL
Qty: 90 TABLET | Refills: 3 | Status: SHIPPED | OUTPATIENT
Start: 2017-11-30 | End: 2018-01-01 | Stop reason: SDUPTHER

## 2017-11-30 NOTE — TELEPHONE ENCOUNTER
"----- Message from Tarsha Son sent at 11/30/2017 10:25 AM CST -----  Contact: [facility] [phone] ref# [id number]  RX request - refill or new RX.  Is this a refill or new RX:  Refill  RX name and strength: atorvastatin (LIPITOR) 80 MG tablet  Directions:   Is this a 30 day or 90 day RX:    Pharmacy name and phone # (DON'T enter "on file" or "in chart"): [facility] - [address]  [phone] (Phone)  [phone] (Fax)      Comments:        "

## 2017-11-30 NOTE — TELEPHONE ENCOUNTER
"----- Message from Tarsha Son sent at 11/30/2017 10:25 AM CST -----  Contact: [facility] [phone] ref# [id number]  RX request - refill or new RX.  Is this a refill or new RX:  Refill  RX name and strength: atorvastatin (LIPITOR) 80 MG tablet  Directions:   Is this a 30 day or 90 day RX:    Pharmacy name and phone # (DON'T enter "on file" or "in chart"): [facility] - [address]  [phone] (Phone)  [phone] (Fax)      Comments:        "

## 2017-12-07 ENCOUNTER — HOSPITAL ENCOUNTER (OUTPATIENT)
Facility: HOSPITAL | Age: 57
Discharge: HOME HEALTH | End: 2017-12-08
Attending: EMERGENCY MEDICINE | Admitting: PSYCHIATRY & NEUROLOGY
Payer: MEDICARE

## 2017-12-07 DIAGNOSIS — Z99.2 ESRD ON HEMODIALYSIS: Chronic | ICD-10-CM

## 2017-12-07 DIAGNOSIS — R29.898 RIGHT LEG WEAKNESS: Primary | ICD-10-CM

## 2017-12-07 DIAGNOSIS — T82.9XXD COMPLICATION OF ARTERIOVENOUS DIALYSIS FISTULA, SUBSEQUENT ENCOUNTER: Primary | ICD-10-CM

## 2017-12-07 DIAGNOSIS — I63.9 STROKE: ICD-10-CM

## 2017-12-07 DIAGNOSIS — R47.1 DYSARTHRIA: ICD-10-CM

## 2017-12-07 DIAGNOSIS — R41.82 ALTERED MENTAL STATUS, UNSPECIFIED ALTERED MENTAL STATUS TYPE: ICD-10-CM

## 2017-12-07 DIAGNOSIS — N18.6 ESRD ON HEMODIALYSIS: Chronic | ICD-10-CM

## 2017-12-07 DIAGNOSIS — R29.90 EPISODE OF TRANSIENT NEUROLOGIC SYMPTOMS: ICD-10-CM

## 2017-12-07 LAB
ANISOCYTOSIS BLD QL SMEAR: SLIGHT
BASOPHILS # BLD AUTO: 0.04 K/UL
BASOPHILS NFR BLD: 0.4 %
DIFFERENTIAL METHOD: ABNORMAL
EOSINOPHIL # BLD AUTO: 0.1 K/UL
EOSINOPHIL NFR BLD: 0.5 %
ERYTHROCYTE [DISTWIDTH] IN BLOOD BY AUTOMATED COUNT: 15.5 %
ESTIMATED AVG GLUCOSE: 140 MG/DL
HBA1C MFR BLD HPLC: 6.5 %
HCT VFR BLD AUTO: 49.7 %
HGB BLD-MCNC: 16.2 G/DL
HYPOCHROMIA BLD QL SMEAR: ABNORMAL
IMM GRANULOCYTES # BLD AUTO: 0.08 K/UL
IMM GRANULOCYTES NFR BLD AUTO: 0.7 %
INR PPP: 2
LYMPHOCYTES # BLD AUTO: 1.5 K/UL
LYMPHOCYTES NFR BLD: 13.4 %
MCH RBC QN AUTO: 29.5 PG
MCHC RBC AUTO-ENTMCNC: 32.6 G/DL
MCV RBC AUTO: 90 FL
MONOCYTES # BLD AUTO: 1.3 K/UL
MONOCYTES NFR BLD: 11.4 %
NEUTROPHILS # BLD AUTO: 8.2 K/UL
NEUTROPHILS NFR BLD: 73.6 %
NRBC BLD-RTO: 0 /100 WBC
PLATELET # BLD AUTO: 211 K/UL
PLATELET BLD QL SMEAR: ABNORMAL
PMV BLD AUTO: ABNORMAL FL
POCT GLUCOSE: 159 MG/DL (ref 70–110)
POLYCHROMASIA BLD QL SMEAR: ABNORMAL
PROTHROMBIN TIME: 20.1 SEC
RBC # BLD AUTO: 5.5 M/UL
WBC # BLD AUTO: 11.09 K/UL

## 2017-12-07 PROCEDURE — 25000003 PHARM REV CODE 250: Performed by: STUDENT IN AN ORGANIZED HEALTH CARE EDUCATION/TRAINING PROGRAM

## 2017-12-07 PROCEDURE — 83036 HEMOGLOBIN GLYCOSYLATED A1C: CPT

## 2017-12-07 PROCEDURE — 82962 GLUCOSE BLOOD TEST: CPT

## 2017-12-07 PROCEDURE — G0378 HOSPITAL OBSERVATION PER HR: HCPCS

## 2017-12-07 PROCEDURE — 85610 PROTHROMBIN TIME: CPT

## 2017-12-07 PROCEDURE — A4216 STERILE WATER/SALINE, 10 ML: HCPCS | Performed by: PHYSICIAN ASSISTANT

## 2017-12-07 PROCEDURE — 99220 PR INITIAL OBSERVATION CARE,LEVL III: CPT | Mod: ,,, | Performed by: PSYCHIATRY & NEUROLOGY

## 2017-12-07 PROCEDURE — 85025 COMPLETE CBC W/AUTO DIFF WBC: CPT

## 2017-12-07 PROCEDURE — 25000003 PHARM REV CODE 250: Performed by: PHYSICIAN ASSISTANT

## 2017-12-07 PROCEDURE — 36415 COLL VENOUS BLD VENIPUNCTURE: CPT

## 2017-12-07 PROCEDURE — 99285 EMERGENCY DEPT VISIT HI MDM: CPT

## 2017-12-07 RX ORDER — KETOROLAC TROMETHAMINE 5 MG/ML
1 SOLUTION OPHTHALMIC DAILY
Status: DISCONTINUED | OUTPATIENT
Start: 2017-12-08 | End: 2017-12-08 | Stop reason: HOSPADM

## 2017-12-07 RX ORDER — PANTOPRAZOLE SODIUM 40 MG/1
40 TABLET, DELAYED RELEASE ORAL DAILY
Status: DISCONTINUED | OUTPATIENT
Start: 2017-12-08 | End: 2017-12-08 | Stop reason: HOSPADM

## 2017-12-07 RX ORDER — SODIUM CHLORIDE 0.9 % (FLUSH) 0.9 %
3 SYRINGE (ML) INJECTION EVERY 8 HOURS
Status: DISCONTINUED | OUTPATIENT
Start: 2017-12-07 | End: 2017-12-08 | Stop reason: HOSPADM

## 2017-12-07 RX ORDER — CLOPIDOGREL BISULFATE 75 MG/1
75 TABLET ORAL DAILY
Status: DISCONTINUED | OUTPATIENT
Start: 2017-12-08 | End: 2017-12-08 | Stop reason: HOSPADM

## 2017-12-07 RX ORDER — LEVETIRACETAM 500 MG/1
500 TABLET ORAL 2 TIMES DAILY
Status: DISCONTINUED | OUTPATIENT
Start: 2017-12-07 | End: 2017-12-08 | Stop reason: HOSPADM

## 2017-12-07 RX ORDER — ACETAMINOPHEN 325 MG/1
650 TABLET ORAL ONCE
Status: COMPLETED | OUTPATIENT
Start: 2017-12-07 | End: 2017-12-07

## 2017-12-07 RX ORDER — GABAPENTIN 300 MG/1
300 CAPSULE ORAL 3 TIMES DAILY
Status: DISCONTINUED | OUTPATIENT
Start: 2017-12-07 | End: 2017-12-08 | Stop reason: HOSPADM

## 2017-12-07 RX ORDER — FLUDROCORTISONE ACETATE 0.1 MG/1
100 TABLET ORAL 2 TIMES DAILY
Status: DISCONTINUED | OUTPATIENT
Start: 2017-12-07 | End: 2017-12-08 | Stop reason: HOSPADM

## 2017-12-07 RX ORDER — PREDNISOLONE ACETATE 10 MG/ML
1 SUSPENSION/ DROPS OPHTHALMIC 3 TIMES DAILY
Status: DISCONTINUED | OUTPATIENT
Start: 2017-12-07 | End: 2017-12-08 | Stop reason: HOSPADM

## 2017-12-07 RX ORDER — NORTRIPTYLINE HYDROCHLORIDE 25 MG/1
25 CAPSULE ORAL NIGHTLY
Status: DISCONTINUED | OUTPATIENT
Start: 2017-12-07 | End: 2017-12-08 | Stop reason: HOSPADM

## 2017-12-07 RX ORDER — POLYETHYLENE GLYCOL 3350 17 G/17G
17 POWDER, FOR SOLUTION ORAL DAILY
Status: DISCONTINUED | OUTPATIENT
Start: 2017-12-08 | End: 2017-12-08 | Stop reason: HOSPADM

## 2017-12-07 RX ORDER — DIGOXIN 125 MCG
0.12 TABLET ORAL EVERY OTHER DAY
Status: DISCONTINUED | OUTPATIENT
Start: 2017-12-08 | End: 2017-12-08 | Stop reason: HOSPADM

## 2017-12-07 RX ORDER — WARFARIN SODIUM 5 MG/1
5 TABLET ORAL DAILY
Status: DISCONTINUED | OUTPATIENT
Start: 2017-12-07 | End: 2017-12-08 | Stop reason: HOSPADM

## 2017-12-07 RX ORDER — CHOLECALCIFEROL (VITAMIN D3) 25 MCG
1000 TABLET ORAL DAILY
Status: DISCONTINUED | OUTPATIENT
Start: 2017-12-08 | End: 2017-12-08 | Stop reason: HOSPADM

## 2017-12-07 RX ORDER — PREDNISONE 10 MG/1
10 TABLET ORAL DAILY
Status: DISCONTINUED | OUTPATIENT
Start: 2017-12-08 | End: 2017-12-08 | Stop reason: HOSPADM

## 2017-12-07 RX ORDER — MIDODRINE HYDROCHLORIDE 5 MG/1
10 TABLET ORAL
Status: DISCONTINUED | OUTPATIENT
Start: 2017-12-08 | End: 2017-12-08 | Stop reason: HOSPADM

## 2017-12-07 RX ORDER — SEVELAMER CARBONATE 800 MG/1
800 TABLET, FILM COATED ORAL
Status: DISCONTINUED | OUTPATIENT
Start: 2017-12-07 | End: 2017-12-08

## 2017-12-07 RX ORDER — PREDNISONE 10 MG/1
10 TABLET ORAL DAILY
Status: DISCONTINUED | OUTPATIENT
Start: 2017-12-08 | End: 2017-12-07

## 2017-12-07 RX ORDER — CINACALCET 30 MG/1
30 TABLET, FILM COATED ORAL
Status: DISCONTINUED | OUTPATIENT
Start: 2017-12-08 | End: 2017-12-08 | Stop reason: HOSPADM

## 2017-12-07 RX ORDER — ATORVASTATIN CALCIUM 20 MG/1
80 TABLET, FILM COATED ORAL DAILY
Status: DISCONTINUED | OUTPATIENT
Start: 2017-12-08 | End: 2017-12-08 | Stop reason: HOSPADM

## 2017-12-07 RX ORDER — LABETALOL HYDROCHLORIDE 5 MG/ML
10 INJECTION, SOLUTION INTRAVENOUS
Status: DISCONTINUED | OUTPATIENT
Start: 2017-12-07 | End: 2017-12-08 | Stop reason: HOSPADM

## 2017-12-07 RX ADMIN — GABAPENTIN 300 MG: 300 CAPSULE ORAL at 09:12

## 2017-12-07 RX ADMIN — LEVETIRACETAM 500 MG: 500 TABLET ORAL at 09:12

## 2017-12-07 RX ADMIN — FLUDROCORTISONE ACETATE 100 MCG: 0.1 TABLET ORAL at 09:12

## 2017-12-07 RX ADMIN — Medication 3 ML: at 09:12

## 2017-12-07 RX ADMIN — SEVELAMER CARBONATE 800 MG: 800 TABLET, FILM COATED ORAL at 05:12

## 2017-12-07 RX ADMIN — NORTRIPTYLINE HYDROCHLORIDE 25 MG: 25 CAPSULE ORAL at 09:12

## 2017-12-07 RX ADMIN — ACETAMINOPHEN 650 MG: 325 TABLET ORAL at 05:12

## 2017-12-07 NOTE — SUBJECTIVE & OBJECTIVE
Past Medical History:   Diagnosis Date    Anemia in ESRD (end-stage renal disease) 3/7/2016    Anticoagulant long-term use     Cervical radiculopathy 2015    CHF (congestive heart failure)     Chronic combined systolic and diastolic heart failure 2013    EF 20% , improved with Medical therapy, negative PET     Chronic respiratory failure with hypoxia 2013    On home oxygen at 2-5 liters    Closed fracture of proximal end of right fibula 2016    Complications due to renal dialysis device, implant, and graft     DDD (degenerative disc disease), lumbar 2015    Dependence on renal dialysis     ESRD on hemodialysis 2016    Essential hypertension     Gout     Lateral meniscal tear 2016    Lumbar stenosis 2015    Pacemaker     Paroxysmal atrial fibrillation 2014    Not on anticoagulation    Peripheral neuropathy 2013    Personal history of gastric ulcer 3/19/2013    Sarcoidosis, lung 2009    Diagnosed in  with ocular manifestation, on 4L home O2 and PO steroids    Secondary pulmonary hypertension 2015    Seizure disorder 3/19/2013    Seizures     Shingles 2013    Thyroid disease      Past Surgical History:   Procedure Laterality Date    ABDOMINAL SURGERY      CARDIAC PACEMAKER PLACEMENT       SECTION      x2    OTHER SURGICAL HISTORY      loop recorder implant    stent to fistula      VASCULAR SURGERY      fistula to L upper arm      Family History   Problem Relation Age of Onset    Kidney failure Mother     Hypertension Mother     Diabetes Mother     Coronary artery disease Father     Hypertension Father     Diabetes Father     Lupus Sister     Diabetes Maternal Grandmother     Colon cancer Neg Hx     Ovarian cancer Neg Hx     Breast cancer Neg Hx      Social History   Substance Use Topics    Smoking status: Former Smoker     Packs/day: 0.50     Years: 10.00     Types: Cigarettes     Quit date:  4/22/1994    Smokeless tobacco: Never Used    Alcohol use No      Comment: rarely     Review of patient's allergies indicates:   Allergen Reactions    Bactrim [sulfamethoxazole-trimethoprim] Other (See Comments)     Causes renal failure    Nsaids (non-steroidal anti-inflammatory drug) Other (See Comments)     Renal failure       Medications: I have reviewed the current medication administration record.      (Not in a hospital admission)    Review of Systems   Constitutional: Negative for chills and fever.   Eyes: Positive for redness. Negative for visual disturbance.   Respiratory: Positive for shortness of breath (on oxygen at baseline). Negative for cough.    Cardiovascular: Negative for chest pain and palpitations.   Gastrointestinal: Negative for nausea and vomiting.   Skin: Negative for rash.   Neurological: Positive for speech difficulty and weakness.   Psychiatric/Behavioral: Negative for agitation.     Objective:     Vital Signs (Most Recent):  Temp: 98.4 °F (36.9 °C) (12/07/17 1122)  Pulse: 73 (12/07/17 1122)  Resp: 18 (12/07/17 1122)  BP: 107/68 (12/07/17 1122)  SpO2: 96 % (12/07/17 1122)    Vital Signs Range (Last 24H):  Temp:  [98.4 °F (36.9 °C)]   Pulse:  [73]   Resp:  [18]   BP: (107)/(68)   SpO2:  [96 %]     Physical Exam   Constitutional: She is oriented to person, place, and time. She appears well-developed and well-nourished. No distress.   HENT:   Head: Normocephalic and atraumatic.   Eyes: EOM are normal. Pupils are equal, round, and reactive to light.   Cardiovascular: Normal rate.    Pulmonary/Chest: Effort normal.   Neurological: She is alert and oriented to person, place, and time.   Skin: Skin is warm and dry.   Vitals reviewed.      Neurological Exam:   LOC: alert  Attention Span: Good   Language: No aphasia  Articulation: Dysarthria  Orientation: Person, Place, Time   Visual Fields: Full  EOM (CN III, IV, VI): Full/intact  Pupils (CN II, III): PERRL  Facial Sensation (CN V):  Normal  Facial Movement (CN VII): Symmetric facial expression    Motor: Arm left Normal (5/5)  Leg left Normal (5/5)  Arm right Normal (5/5)  Leg right 3/5  Cebellar: No evidence of appendicular or axial ataxia  Sensation: decreased sensation in RLE  Tone: Normal tone throughout      Laboratory:  CMP: No results for input(s): GLUCOSE, CALCIUM, ALBUMIN, PROT, NA, K, CO2, CL, BUN, CREATININE, ALKPHOS, ALT, AST, BILITOT in the last 24 hours.  CBC:   Recent Labs  Lab 12/07/17  1232   WBC 11.09   RBC 5.50*   HGB 16.2*   HCT 49.7*      MCV 90   MCH 29.5   MCHC 32.6     Lipid Panel: No results for input(s): CHOL, LDLCALC, HDL, TRIG in the last 168 hours.  Coagulation: No results for input(s): PT, INR, APTT in the last 168 hours.  Hgb A1C: No results for input(s): HGBA1C in the last 168 hours.  TSH: No results for input(s): TSH in the last 168 hours.    Diagnostic Results:      Brain imaging:    CT Head. Date: 12/07/17  No evidence of acute intracranial pathology.     Vessel Imaging:  pending    Cardiac Evaluation:   pending

## 2017-12-07 NOTE — ED PROVIDER NOTES
Encounter Date: 12/7/2017    SCRIBE #1 NOTE: I, Dana Díaz, am scribing for, and in the presence of,  Dr. Mojica . I have scribed the entire note.       History     Chief Complaint   Patient presents with    Neurologic Problem     Pt went unresponsive at dialysis. Per EMS pt now has slurred speech.     Time patient was seen by the provider: 11:25 AM    The patient is a 57 y.o. female with hx of: HTN, CHF, and ESRD on hemodialysis that presents to the ED with a complaint of a neurologic problem. Pt had dialysis this morning and was in her 4th of 5 hours of dialysis when she became unresponsive. She was noted to have a systolic blood pressure in the 90's. The paramedics gave her IV fluids which raised it to 101. Pt woke back up and her speech was slurred. She c/o heaviness to her right leg and a HA.         The history is provided by the patient and the EMS personnel.     Review of patient's allergies indicates:   Allergen Reactions    Bactrim [sulfamethoxazole-trimethoprim] Other (See Comments)     Causes renal failure    Nsaids (non-steroidal anti-inflammatory drug) Other (See Comments)     Renal failure     Past Medical History:   Diagnosis Date    Anemia in ESRD (end-stage renal disease) 3/7/2016    Anticoagulant long-term use     Cervical radiculopathy 7/20/2015    CHF (congestive heart failure)     Chronic combined systolic and diastolic heart failure 6/14/2013    EF 20% 2013, improved with Medical therapy, negative PET 2013    Chronic respiratory failure with hypoxia 04/22/2013    On home oxygen at 2-5 liters    Closed fracture of proximal end of right fibula 6/27/2016    Complications due to renal dialysis device, implant, and graft     DDD (degenerative disc disease), lumbar 6/17/2015    Dependence on renal dialysis     ESRD on hemodialysis 2/23/2016    Essential hypertension     Gout     Lateral meniscal tear 5/31/2016    Lumbar stenosis 6/17/2015    Pacemaker     Paroxysmal atrial  fibrillation 2014    Not on anticoagulation    Peripheral neuropathy 2013    Personal history of gastric ulcer 3/19/2013    Sarcoidosis, lung 2009    Diagnosed in  with ocular manifestation, on 4L home O2 and PO steroids    Secondary pulmonary hypertension 2015    Seizure disorder 3/19/2013    Seizures     Shingles 2013    Thyroid disease      Past Surgical History:   Procedure Laterality Date    ABDOMINAL SURGERY      CARDIAC PACEMAKER PLACEMENT       SECTION      x2    OTHER SURGICAL HISTORY      loop recorder implant    stent to fistula      VASCULAR SURGERY      fistula to L upper arm      Family History   Problem Relation Age of Onset    Kidney failure Mother     Hypertension Mother     Diabetes Mother     Coronary artery disease Father     Hypertension Father     Diabetes Father     Lupus Sister     Diabetes Maternal Grandmother     Colon cancer Neg Hx     Ovarian cancer Neg Hx     Breast cancer Neg Hx      Social History   Substance Use Topics    Smoking status: Former Smoker     Packs/day: 0.50     Years: 10.00     Types: Cigarettes     Quit date: 1994    Smokeless tobacco: Never Used    Alcohol use No      Comment: rarely     Review of Systems   Constitutional: Negative for fever.   HENT: Negative for sore throat.    Respiratory: Negative for shortness of breath.    Cardiovascular: Negative for chest pain.   Gastrointestinal: Negative for nausea.   Genitourinary: Negative for dysuria.   Musculoskeletal: Negative for back pain.        Positive for right leg heaviness.    Skin: Negative for rash.   Neurological: Positive for speech difficulty and headaches. Negative for weakness.   Hematological: Does not bruise/bleed easily.       Physical Exam     Initial Vitals [17 1122]   BP Pulse Resp Temp SpO2   107/68 73 18 98.4 °F (36.9 °C) 96 %      MAP       81         Physical Exam    Nursing note and vitals reviewed.  Constitutional: She  appears well-developed and well-nourished. She is not diaphoretic. No distress.   HENT:   Head: Normocephalic and atraumatic.   Eyes: EOM are normal. Pupils are equal, round, and reactive to light.   Neck: Neck supple.   Cardiovascular: Normal rate, regular rhythm, normal heart sounds and intact distal pulses. Exam reveals no gallop and no friction rub.    No murmur heard.  Pulmonary/Chest: Breath sounds normal. No respiratory distress.   Abdominal: Soft. She exhibits no distension. There is no tenderness.   Musculoskeletal:   Dialysis clamp on LUE.    Neurological: She is alert and oriented to person, place, and time. No sensory deficit.   Finger to nose in tact. Pt c/o heaviness to her RLE. Speech fluent and high pitched.          ED Course   Procedures  Labs Reviewed   CBC W/ AUTO DIFFERENTIAL - Abnormal; Notable for the following:        Result Value    RBC 5.50 (*)     Hemoglobin 16.2 (*)     Hematocrit 49.7 (*)     RDW 15.5 (*)     Immature Granulocytes 0.7 (*)     Gran # 8.2 (*)     Immature Grans (Abs) 0.08 (*)     Mono # 1.3 (*)     Gran% 73.6 (*)     Lymph% 13.4 (*)     All other components within normal limits   POCT GLUCOSE - Abnormal; Notable for the following:     POCT Glucose 159 (*)     All other components within normal limits   LIPID PANEL   HEMOGLOBIN A1C   TSH   URINALYSIS   PROTIME-INR   POCT GLUCOSE     Imaging Results          X-Ray Chest AP Portable (Final result)  Result time 12/07/17 12:34:41    Final result by Rohith Lei MD (12/07/17 12:34:41)                 Impression:     See above      Electronically signed by: Rohith Lei MD  Date:     12/07/17  Time:    12:34              Narrative:    Pacemaker identified.  Mild cardiomegaly.  The lungs are clear.  No pleural effusion.  Vascular stent in the left axilla as before                             CT Head Without Contrast (Final result)  Result time 12/07/17 12:07:23    Final result by Jagjit Montes De Oca MD (12/07/17 12:07:23)                  Impression:         No evidence of acute intracranial pathology.     Findings discussed with CHERYL GRANDE by Dr. Joanne Jenkins at 11:58:17 on Thursday, December 07, 2017.  ______________________________________     Electronically signed by resident: JOANNE JENKINS MD  Date:     12/07/17  Time:    12:03            As the supervising and teaching physician, I personally reviewed the images and resident's interpretation and I agree with the findings.          Electronically signed by: VICKEY GUZMAN MD  Date:     12/07/17  Time:    12:07              Narrative:    CT head without contrast    Date/Time: 12/07/17 11:35:28    Accession# [id number]      CLINICAL INDICATION: 57 year old F with Stroke     TECHNIQUE: Axial CT images obtained throughout the region of the head without the administration of intravenous contrast. Axial, sagittal and coronal reconstructions were performed.    COMPARISON: No priors.    FINDINGS:    The ventricles are normal in size without evidence of hydrocephalus.    The brain appears within normal limits for age. No parenchymal mass, hemorrhage, edema or recent or remote major vascular distribution infarct.      No extra-axial blood or fluid collections.    The cranium appears intact. Mastoid air cells are clear. Mucosal thickening the partially visualized paranasal sinuses.                              EKG Readings: (Independently Interpreted)   Paced Rhythm rate of 70. No ischemic change.           Medical Decision Making:   History:   Old Medical Records: I decided to obtain old medical records.  Initial Assessment:   My initial differential diagnoses include but are not limited to: stroke, orthostatic hypotension, arrhythmia, and electrolyte abnormality.       12:20PM  Spoke to Tarsha from neurology who will order an MRI.     Stroke came to see pt. They will admit for further neurologic diagnostic workup.   Independently Interpreted Test(s):   I have ordered and independently interpreted  EKG Reading(s) - see prior notes  Clinical Tests:   Lab Tests: Ordered and Reviewed  Radiological Study: Ordered and Reviewed  Medical Tests: Ordered and Reviewed            Scribe Attestation:   Scribe #1: I performed the above scribed service and the documentation accurately describes the services I performed. I attest to the accuracy of the note.            ED Course      Clinical Impression:   The primary encounter diagnosis was Altered mental status, unspecified altered mental status type. A diagnosis of Stroke was also pertinent to this visit.                           Víctor Mojica,   12/07/17 1627       Víctor Mojica, DO  12/07/17 1658

## 2017-12-07 NOTE — ED TRIAGE NOTES
"Pt returned to ED room 4 from 2nd floor CT. Pt arrived to ED with CC of difficulty speaking and numbness and tingling to BLE onset while receiving dialysis today. Pt reports received 4 hours out of 5 hours of scheduled dialysis when symptoms began. Pt anxious on arrival.   Pt c/o HA and increased SOB with exertion. Denies numbness or tingling at this time, states "I feel better." Denies dizziness vision changes or CP. Denies N/V.     Patient identifiers verified and correct for Sisi Medel.    LOC: The patient is awake, alert and oriented x 4. Pt is speaking appropriately, no slurred speech at this time.  APPEARANCE: Patient resting comfortably and in no acute distress. Pt is clean and well groomed. No JVD visible. Pt reports pain level of 0.  RESPIRATORY: Airway is open and patent. Respirations-unlabored, regular rate, equal bilaterally on inspiration and expiration. No accessory muscle use noted. Lungs clear to auscultation in all fields bilaterally anterior and posterior.   CARDIAC: Patient has regular heart rate and rhythm.  No peripheral edema noted, and patient has no c/o chest pain.  ABDOMEN: Soft and non-tender to palpation with no distention noted. Normoactive bowel sounds X4 quadrants. Pt c/o constipation.  NEUROLOGIC: -SEE NEURO ASSESSMENT FLOW SHEET-  : No complaints of frequency, burning, urgency or blood in the urine.     "

## 2017-12-07 NOTE — ASSESSMENT & PLAN NOTE
Sisi Medel is a 57 y.o. female who presented from dialysis with episode of unresponsiveness likely 2/2 hypotension. Patient woke up and was experiencing dysarthria and RLE weakness/numbness. Initial NIH was 5. CT was negative for acute abnormalities. She is unable to have and MRI 2/2 pacemaker. Patient's states her symptoms resolved while in ED. NIH now currently 0. Will admit for TIA workup. Scheduled 24 hour CTA head and neck for 12/8/17 at 12pm.      Antiplatelets: continue home plavix and coumadin  Statins: continue home atorvastatin  SBP <180  VTE ppx: patient on coumadin  PT/OT, SLP, and PM&R  Neuro checks q4h

## 2017-12-07 NOTE — HPI
Sisi Medel is a 57 y.o. female with PMHx of HF, pulmonary sarcoidosis, ESRD, HLD, a fib (coumadin), and neuropathy who presented to ED with dysarthria and RLE weakness/numbness. Patient was at dialysis and was found unresponsive with SBP in 90s. She was given a fluid bolus and began to wake up and respond. Once awake, she was noted to have slurred speech and RLE weakness. Patient was last known normal around 10am.

## 2017-12-07 NOTE — ED NOTES
Pt transferred to 2nd floor CT with RN and continuous cardiac monitor/ continuous pulse ox/ auto BP cuff.

## 2017-12-08 ENCOUNTER — TELEPHONE (OUTPATIENT)
Dept: INTERNAL MEDICINE | Facility: CLINIC | Age: 57
End: 2017-12-08

## 2017-12-08 VITALS
OXYGEN SATURATION: 98 % | SYSTOLIC BLOOD PRESSURE: 118 MMHG | WEIGHT: 282.63 LBS | HEART RATE: 69 BPM | BODY MASS INDEX: 42.83 KG/M2 | RESPIRATION RATE: 18 BRPM | DIASTOLIC BLOOD PRESSURE: 92 MMHG | HEIGHT: 68 IN | TEMPERATURE: 98 F

## 2017-12-08 PROBLEM — R29.90 EPISODE OF TRANSIENT NEUROLOGIC SYMPTOMS: Status: ACTIVE | Noted: 2017-12-08

## 2017-12-08 PROBLEM — E04.2 MULTINODULAR THYROID: Status: ACTIVE | Noted: 2017-12-08

## 2017-12-08 PROBLEM — E11.9 DIABETES MELLITUS TYPE II, CONTROLLED: Status: ACTIVE | Noted: 2017-12-08

## 2017-12-08 LAB
ALBUMIN SERPL BCP-MCNC: 3.3 G/DL
ALP SERPL-CCNC: 140 U/L
ALT SERPL W/O P-5'-P-CCNC: 16 U/L
ANION GAP SERPL CALC-SCNC: 19 MMOL/L
APTT BLDCRRT: 30.1 SEC
AST SERPL-CCNC: 22 U/L
BASOPHILS # BLD AUTO: 0.05 K/UL
BASOPHILS NFR BLD: 0.5 %
BILIRUB SERPL-MCNC: 0.7 MG/DL
BUN SERPL-MCNC: 46 MG/DL
CALCIUM SERPL-MCNC: 9.5 MG/DL
CHLORIDE SERPL-SCNC: 92 MMOL/L
CHOLEST SERPL-MCNC: 169 MG/DL
CHOLEST SERPL-MCNC: 192 MG/DL
CHOLEST/HDLC SERPL: 2.8 {RATIO}
CHOLEST/HDLC SERPL: 2.9 {RATIO}
CK MB SERPL-MCNC: 5.3 NG/ML
CK MB SERPL-RTO: 12.6 %
CK SERPL-CCNC: 42 U/L
CO2 SERPL-SCNC: 26 MMOL/L
CREAT SERPL-MCNC: 8 MG/DL
DIFFERENTIAL METHOD: ABNORMAL
EOSINOPHIL # BLD AUTO: 0.1 K/UL
EOSINOPHIL NFR BLD: 0.9 %
ERYTHROCYTE [DISTWIDTH] IN BLOOD BY AUTOMATED COUNT: 14.6 %
EST. GFR  (AFRICAN AMERICAN): 5.9 ML/MIN/1.73 M^2
EST. GFR  (NON AFRICAN AMERICAN): 5.1 ML/MIN/1.73 M^2
ESTIMATED PA SYSTOLIC PRESSURE: 19.89
FERRITIN SERPL-MCNC: 674 NG/ML
GLUCOSE SERPL-MCNC: 101 MG/DL
HCT VFR BLD AUTO: 47 %
HDLC SERPL-MCNC: 58 MG/DL
HDLC SERPL-MCNC: 68 MG/DL
HDLC SERPL: 34.3 %
HDLC SERPL: 35.4 %
HGB BLD-MCNC: 14.9 G/DL
IMM GRANULOCYTES # BLD AUTO: 0.05 K/UL
IMM GRANULOCYTES NFR BLD AUTO: 0.5 %
INR PPP: 1.6
IRON SERPL-MCNC: 62 UG/DL
LDLC SERPL CALC-MCNC: 100 MG/DL
LDLC SERPL CALC-MCNC: 91.4 MG/DL
LYMPHOCYTES # BLD AUTO: 1.8 K/UL
LYMPHOCYTES NFR BLD: 16.4 %
MAGNESIUM SERPL-MCNC: 2.6 MG/DL
MCH RBC QN AUTO: 30.2 PG
MCHC RBC AUTO-ENTMCNC: 31.7 G/DL
MCV RBC AUTO: 95 FL
MITRAL VALVE MOBILITY: NORMAL
MITRAL VALVE REGURGITATION: ABNORMAL
MONOCYTES # BLD AUTO: 1.8 K/UL
MONOCYTES NFR BLD: 16.9 %
NEUTROPHILS # BLD AUTO: 6.9 K/UL
NEUTROPHILS NFR BLD: 64.8 %
NONHDLC SERPL-MCNC: 111 MG/DL
NONHDLC SERPL-MCNC: 124 MG/DL
NRBC BLD-RTO: 0 /100 WBC
PHOSPHATE SERPL-MCNC: 6.6 MG/DL
PLATELET # BLD AUTO: 232 K/UL
PMV BLD AUTO: 11.4 FL
POTASSIUM SERPL-SCNC: 4.9 MMOL/L
PROT SERPL-MCNC: 7.6 G/DL
PROTHROMBIN TIME: 16.4 SEC
PTH-INTACT SERPL-MCNC: 535 PG/ML
RBC # BLD AUTO: 4.94 M/UL
RETIRED EF AND QEF - SEE NOTES: 30 (ref 55–65)
SATURATED IRON: 22 %
SODIUM SERPL-SCNC: 137 MMOL/L
TOTAL IRON BINDING CAPACITY: 280 UG/DL
TRANSFERRIN SERPL-MCNC: 189 MG/DL
TRICUSPID VALVE REGURGITATION: ABNORMAL
TRIGL SERPL-MCNC: 120 MG/DL
TRIGL SERPL-MCNC: 98 MG/DL
TROPONIN I SERPL DL<=0.01 NG/ML-MCNC: 0.28 NG/ML
WBC # BLD AUTO: 10.69 K/UL

## 2017-12-08 PROCEDURE — G8980 MOBILITY D/C STATUS: HCPCS | Mod: CJ

## 2017-12-08 PROCEDURE — 85025 COMPLETE CBC W/AUTO DIFF WBC: CPT

## 2017-12-08 PROCEDURE — 82728 ASSAY OF FERRITIN: CPT

## 2017-12-08 PROCEDURE — 82668 ASSAY OF ERYTHROPOIETIN: CPT

## 2017-12-08 PROCEDURE — G8978 MOBILITY CURRENT STATUS: HCPCS | Mod: CJ

## 2017-12-08 PROCEDURE — 93306 TTE W/DOPPLER COMPLETE: CPT | Mod: 26,,, | Performed by: INTERNAL MEDICINE

## 2017-12-08 PROCEDURE — 99214 OFFICE O/P EST MOD 30 MIN: CPT | Mod: GC,,, | Performed by: INTERNAL MEDICINE

## 2017-12-08 PROCEDURE — 99222 1ST HOSP IP/OBS MODERATE 55: CPT | Mod: ,,, | Performed by: NURSE PRACTITIONER

## 2017-12-08 PROCEDURE — 82553 CREATINE MB FRACTION: CPT

## 2017-12-08 PROCEDURE — 80053 COMPREHEN METABOLIC PANEL: CPT

## 2017-12-08 PROCEDURE — G8988 SELF CARE GOAL STATUS: HCPCS | Mod: CH

## 2017-12-08 PROCEDURE — 93306 TTE W/DOPPLER COMPLETE: CPT

## 2017-12-08 PROCEDURE — 83735 ASSAY OF MAGNESIUM: CPT

## 2017-12-08 PROCEDURE — 25000003 PHARM REV CODE 250: Performed by: PHYSICIAN ASSISTANT

## 2017-12-08 PROCEDURE — 97165 OT EVAL LOW COMPLEX 30 MIN: CPT

## 2017-12-08 PROCEDURE — G0378 HOSPITAL OBSERVATION PER HR: HCPCS

## 2017-12-08 PROCEDURE — A4216 STERILE WATER/SALINE, 10 ML: HCPCS | Performed by: PHYSICIAN ASSISTANT

## 2017-12-08 PROCEDURE — 63600175 PHARM REV CODE 636 W HCPCS: Performed by: PHYSICIAN ASSISTANT

## 2017-12-08 PROCEDURE — G8979 MOBILITY GOAL STATUS: HCPCS | Mod: CJ

## 2017-12-08 PROCEDURE — G8997 SWALLOW GOAL STATUS: HCPCS | Mod: CH

## 2017-12-08 PROCEDURE — 83970 ASSAY OF PARATHORMONE: CPT

## 2017-12-08 PROCEDURE — 80061 LIPID PANEL: CPT | Mod: 91

## 2017-12-08 PROCEDURE — 97116 GAIT TRAINING THERAPY: CPT

## 2017-12-08 PROCEDURE — 36415 COLL VENOUS BLD VENIPUNCTURE: CPT

## 2017-12-08 PROCEDURE — 92523 SPEECH SOUND LANG COMPREHEN: CPT

## 2017-12-08 PROCEDURE — 25000003 PHARM REV CODE 250: Performed by: INTERNAL MEDICINE

## 2017-12-08 PROCEDURE — 83540 ASSAY OF IRON: CPT

## 2017-12-08 PROCEDURE — 84484 ASSAY OF TROPONIN QUANT: CPT

## 2017-12-08 PROCEDURE — G8987 SELF CARE CURRENT STATUS: HCPCS | Mod: CH

## 2017-12-08 PROCEDURE — 97161 PT EVAL LOW COMPLEX 20 MIN: CPT

## 2017-12-08 PROCEDURE — 92610 EVALUATE SWALLOWING FUNCTION: CPT

## 2017-12-08 PROCEDURE — 85730 THROMBOPLASTIN TIME PARTIAL: CPT

## 2017-12-08 PROCEDURE — 97530 THERAPEUTIC ACTIVITIES: CPT

## 2017-12-08 PROCEDURE — 99217 PR OBSERVATION CARE DISCHARGE: CPT | Mod: GC,,, | Performed by: PSYCHIATRY & NEUROLOGY

## 2017-12-08 PROCEDURE — G8996 SWALLOW CURRENT STATUS: HCPCS | Mod: CH

## 2017-12-08 PROCEDURE — G8989 SELF CARE D/C STATUS: HCPCS | Mod: CH

## 2017-12-08 PROCEDURE — 25500020 PHARM REV CODE 255: Performed by: PSYCHIATRY & NEUROLOGY

## 2017-12-08 PROCEDURE — 80061 LIPID PANEL: CPT

## 2017-12-08 PROCEDURE — 85610 PROTHROMBIN TIME: CPT

## 2017-12-08 PROCEDURE — 84100 ASSAY OF PHOSPHORUS: CPT

## 2017-12-08 RX ORDER — SODIUM CHLORIDE 9 MG/ML
INJECTION, SOLUTION INTRAVENOUS
Status: DISCONTINUED | OUTPATIENT
Start: 2017-12-08 | End: 2017-12-08 | Stop reason: HOSPADM

## 2017-12-08 RX ORDER — SODIUM CHLORIDE 9 MG/ML
INJECTION, SOLUTION INTRAVENOUS ONCE
Status: DISCONTINUED | OUTPATIENT
Start: 2017-12-08 | End: 2017-12-08 | Stop reason: HOSPADM

## 2017-12-08 RX ORDER — SEVELAMER CARBONATE 800 MG/1
1600 TABLET, FILM COATED ORAL
Status: DISCONTINUED | OUTPATIENT
Start: 2017-12-08 | End: 2017-12-08 | Stop reason: HOSPADM

## 2017-12-08 RX ADMIN — IOHEXOL 75 ML: 350 INJECTION, SOLUTION INTRAVENOUS at 01:12

## 2017-12-08 RX ADMIN — ATORVASTATIN CALCIUM 80 MG: 20 TABLET, FILM COATED ORAL at 10:12

## 2017-12-08 RX ADMIN — FLUDROCORTISONE ACETATE 100 MCG: 0.1 TABLET ORAL at 10:12

## 2017-12-08 RX ADMIN — LEVETIRACETAM 500 MG: 500 TABLET ORAL at 10:12

## 2017-12-08 RX ADMIN — Medication 3 ML: at 02:12

## 2017-12-08 RX ADMIN — CLOPIDOGREL 75 MG: 75 TABLET, FILM COATED ORAL at 10:12

## 2017-12-08 RX ADMIN — POLYETHYLENE GLYCOL 3350 17 G: 17 POWDER, FOR SOLUTION ORAL at 10:12

## 2017-12-08 RX ADMIN — KETOROLAC TROMETHAMINE 1 DROP: 5 SOLUTION OPHTHALMIC at 10:12

## 2017-12-08 RX ADMIN — PREDNISOLONE ACETATE 1 DROP: 10 SUSPENSION/ DROPS OPHTHALMIC at 10:12

## 2017-12-08 RX ADMIN — WARFARIN SODIUM 5 MG: 5 TABLET ORAL at 12:12

## 2017-12-08 RX ADMIN — DIGOXIN 0.12 MG: 0.12 TABLET ORAL at 10:12

## 2017-12-08 RX ADMIN — GABAPENTIN 300 MG: 300 CAPSULE ORAL at 01:12

## 2017-12-08 RX ADMIN — GABAPENTIN 300 MG: 300 CAPSULE ORAL at 06:12

## 2017-12-08 RX ADMIN — PANTOPRAZOLE SODIUM 40 MG: 40 TABLET, DELAYED RELEASE ORAL at 10:12

## 2017-12-08 RX ADMIN — PREDNISONE 10 MG: 10 TABLET ORAL at 10:12

## 2017-12-08 RX ADMIN — SEVELAMER CARBONATE 1600 MG: 800 TABLET, FILM COATED ORAL at 01:12

## 2017-12-08 RX ADMIN — VITAMIN D, TAB 1000IU (100/BT) 1000 UNITS: 25 TAB at 10:12

## 2017-12-08 NOTE — PLAN OF CARE
12/08/17 1526   Final Note   Assessment Type Final Discharge Note   Discharge Disposition Home-Health       Patient is discharged to home today with [facility] for home health. Sw to set up HH. CM called patient's PCP  Dr. Caputo office to schedule a hospital follow up appt in 1-2 week from today. The office will call the patient with appt date and time. In basket message sent to [facility] for hospital follow up in 4-6 weeks. Patient notified that both offices will call her to schedule the appts. Patient's family will provide transportation home.

## 2017-12-08 NOTE — ASSESSMENT & PLAN NOTE
-dysarthria, RLE weakness and numbness resolving in ED  -unable to have MRI 2/2 pacemaker  -    Recommendations  -  Encourage mobility, OOB in chair at least 3 hours per day, and early ambulation as appropriate  -  PT/OT evaluate and treat  -  Pain management  -  Monitor for and prevent skin breakdown and pressure ulcers  · Early mobility, repositioning/weight shifting every 20-30 minutes when sitting, turn patient every 2 hours, proper mattress/overlay and chair cushioning, pressure relief/heel protector boots  -  DVT prophylaxis:  Coumadin  -  Reviewed discharge options (IP rehab, SNF, HH therapy, and OP therapy)

## 2017-12-08 NOTE — SUBJECTIVE & OBJECTIVE
Past Medical History:   Diagnosis Date    Anemia in ESRD (end-stage renal disease) 3/7/2016    Anticoagulant long-term use     Cervical radiculopathy 2015    CHF (congestive heart failure)     Chronic combined systolic and diastolic heart failure 2013    EF 20% , improved with Medical therapy, negative PET     Chronic respiratory failure with hypoxia 2013    On home oxygen at 2-5 liters    Closed fracture of proximal end of right fibula 2016    Complications due to renal dialysis device, implant, and graft     DDD (degenerative disc disease), lumbar 2015    Dependence on renal dialysis     ESRD on hemodialysis 2016    Essential hypertension     Gout     Lateral meniscal tear 2016    Lumbar stenosis 2015    Pacemaker     Paroxysmal atrial fibrillation 2014    Not on anticoagulation    Peripheral neuropathy 2013    Personal history of gastric ulcer 3/19/2013    Sarcoidosis, lung 2009    Diagnosed in  with ocular manifestation, on 4L home O2 and PO steroids    Secondary pulmonary hypertension 2015    Seizure disorder 3/19/2013    Seizures     Shingles 2013    Thyroid disease      Past Surgical History:   Procedure Laterality Date    ABDOMINAL SURGERY      CARDIAC PACEMAKER PLACEMENT       SECTION      x2    OTHER SURGICAL HISTORY      loop recorder implant    stent to fistula      VASCULAR SURGERY      fistula to L upper arm      Review of patient's allergies indicates:   Allergen Reactions    Bactrim [sulfamethoxazole-trimethoprim] Other (See Comments)     Causes renal failure    Nsaids (non-steroidal anti-inflammatory drug) Other (See Comments)     Renal failure       Scheduled Medications:    sodium chloride 0.9%   Intravenous Once    atorvastatin  80 mg Oral Daily    cinacalcet  30 mg Oral Daily with breakfast    clopidogrel  75 mg Oral Daily    digoxin  0.125 mg Oral Every other day     fludrocortisone  100 mcg Oral BID    gabapentin  300 mg Oral TID    ketorolac 0.5%  1 drop Both Eyes Daily    levETIRAcetam  500 mg Oral BID    midodrine  10 mg Oral Every Mon, Wed, Fri, Sun    nortriptyline  25 mg Oral QHS    pantoprazole  40 mg Oral Daily    polyethylene glycol  17 g Oral Daily    prednisoLONE acetate  1 drop Left Eye TID    predniSONE  10 mg Oral Daily    sevelamer carbonate  1,600 mg Oral TID WM    sodium chloride 0.9%  3 mL Intravenous Q8H    vitamin D  1,000 Units Oral Daily    warfarin  5 mg Oral Daily       PRN Medications: sodium chloride 0.9%, labetalol, sodium chloride 0.9%    Family History     Problem Relation (Age of Onset)    Coronary artery disease Father    Diabetes Mother, Father, Maternal Grandmother    Hypertension Mother, Father    Kidney failure Mother    Lupus Sister        Social History Main Topics    Smoking status: Former Smoker     Packs/day: 0.50     Years: 10.00     Types: Cigarettes     Quit date: 4/22/1994    Smokeless tobacco: Never Used    Alcohol use No      Comment: rarely    Drug use: No    Sexual activity: Not Currently     Review of Systems   Constitutional: Positive for fatigue. Negative for chills and fever.   HENT: Negative for trouble swallowing and voice change.    Eyes: Negative for photophobia and visual disturbance.   Respiratory: Negative for cough, shortness of breath and wheezing.    Cardiovascular: Negative for chest pain and palpitations.   Gastrointestinal: Negative for abdominal distention, nausea and vomiting.   Genitourinary: Negative for difficulty urinating and flank pain.   Musculoskeletal: Positive for gait problem. Negative for arthralgias.   Skin: Negative for color change and rash.   Neurological: Positive for numbness (R foot). Negative for facial asymmetry, speech difficulty, weakness and headaches.   Psychiatric/Behavioral: Negative for agitation and confusion.     Objective:     Vital Signs (Most Recent):  Temp:  97.8 °F (36.6 °C) (12/08/17 0749)  Pulse: 99 (12/08/17 1000)  Resp: 18 (12/08/17 0749)  BP: 113/72 (12/08/17 0749)  SpO2: 97 % (12/08/17 0749)    Vital Signs (24h Range):  Temp:  [97.5 °F (36.4 °C)-98.7 °F (37.1 °C)] 97.8 °F (36.6 °C)  Pulse:  [69-99] 99  Resp:  [16-19] 18  SpO2:  [96 %-100 %] 97 %  BP: (104-121)/(60-86) 113/72     Body mass index is 42.97 kg/m².    Physical Exam   Constitutional: She is oriented to person, place, and time. She appears well-developed.   obese   HENT:   Head: Normocephalic and atraumatic.   Eyes: EOM are normal. Pupils are equal, round, and reactive to light.   Neck: Normal range of motion.   Cardiovascular: Normal rate and regular rhythm.    Pulmonary/Chest: No respiratory distress.   Abdominal: Soft. There is no tenderness.   Musculoskeletal: Normal range of motion. She exhibits no deformity.   Neurological: She is alert and oriented to person, place, and time. A sensory deficit (R foot) is present. She exhibits normal muscle tone.   RUE: 5/5, 5/5 .  LUE: 5/5, 5/5 .  RLE: 5/5, DF 5/5, PF 5/5.  LLE: 5/5, DF 5/5, PF 5/5.     Skin: Skin is warm and dry.   Psychiatric: She has a normal mood and affect. Her behavior is normal. Cognition and memory are not impaired.   Vitals reviewed.    NEUROLOGICAL EXAMINATION:     MENTAL STATUS   Oriented to person, place, and time.     CRANIAL NERVES     CN III, IV, VI   Pupils are equal, round, and reactive to light.  Extraocular motions are normal.       Diagnostic Results:   Labs: Reviewed  ECG: Reviewed  X-Ray: Reviewed  US: Reviewed  CT: Reviewed

## 2017-12-08 NOTE — ASSESSMENT & PLAN NOTE
Etiology: uncertain, but possibly HTN    Modality: iHD  Days: TTS  Access: LUE graft, not much of a thrill, bruit noted, per patient report with increased venous pressures during dialysis, she reports having appointment with vascular surgery, but still appears to be able to complete dialysis sessions  Unit: [facility]  Nephrologist: patient not sure  Tx Duration: 5hrs  EDW: ~121-122  UF : 5-5.5L  Residual fxn: no  Transplant list: deemed to be too high of a risk, details uncertain    HTN: controlled  Metabolic acidosis: bicarb at 26, no issue  Electrolytes: stable K at 4.9  Volume Status: no signs of overload, possibly dry weight should be adjusted upward considering drop in BP during most recent session, will take off less fluid than her usual during her session on Saturday  Anemia: Hgb at 14.9, there has been a significant rise since February of this year, patient is not on epo, complex cysts seen on ultrasound in 2016, will check epo level and get renal ultrasound, need to rule out RCC; also will check iron panel  Metabolic Bone disease:phosphate at 6.6, will increase sevelamer to 1,600mg; also on cinacalcet, no recent PTH, will add to lab  Nuitrition: renal diet, albumin 3.3, which is up from June    Plan for Dialysis: anticipate tomorrow (saturday) as per her usueal TTS schedule, UF goal of 2-3L instead of her usual 5

## 2017-12-08 NOTE — PLAN OF CARE
12/08/17 1030   Discharge Assessment   Assessment Type Discharge Planning Assessment   Confirmed/corrected address and phone number on facesheet? Yes   Assessment information obtained from? Patient   Expected Length of Stay (days) 1   Communicated expected length of stay with patient/caregiver yes   Prior to hospitilization cognitive status: Alert/Oriented   Prior to hospitalization functional status: Assistive Equipment   Current cognitive status: Alert/Oriented   Current Functional Status: Assistive Equipment   Facility Arrived From: Dialysis   Lives With significant other   Able to Return to Prior Arrangements yes   Is patient able to care for self after discharge? Yes   Who are your caregiver(s) and their phone number(s)? [place] Tim[place]jose Magallanes (mother) cell [phone], Kenan Brown (son) [phone]   Patient's perception of discharge disposition home health   Readmission Within The Last 30 Days no previous admission in last 30 days   Patient currently being followed by outpatient case management? No   Patient currently receives any other outside agency services? No   Equipment Currently Used at Home cane, quad;rollator   Do you have any problems affording any of your prescribed medications? No   Is the patient taking medications as prescribed? yes   Does the patient have transportation home? Yes   Transportation Available car;family or friend will provide   Does the patient receive services at the [facility]? Yes   Discharge Plan A Home Health   Discharge Plan B Home Health   Patient/Family In Agreement With Plan yes     PCP:Carlos A Caputo MD    Extended Emergency Contact Information  Primary Emergency Contact: Maikel Magallanes  Address: [address] [place] [address] [id number] [place] [address] of [place]  Home Phone: [phone]  Mobile Phone: [phone]  Relation: Mother  Secondary Emergency Contact: Kenan Brown   [facility]  Home Phone: [phone]  Mobile Phone: [phone]  Relation:  [facility] - [place], [place] - [address]  [address]  [place] [address]  Phone: [phone] Fax: [phone]    [facility] [employer] [address] [place], [place] - [address] [facility] [address] [facility] [address]  [facility] [address]  Phone: [phone] Fax: [phone]    Payor: [employer] MEDICARE / Plan: [employer] / Product Type: Medicare Advantage /     Patient was independent living with her fiance prior to hospitalization. Patient occasionally uses a quad cane or Rollator for long distance walking. Patient uses [facility] for coumadin management. Patient has used HH in the past but could not remember what company was used. Patient is fine with any agency if HH is needed. Cm will continue to follow.

## 2017-12-08 NOTE — PLAN OF CARE
Problem: Stroke (Ischemic) (Adult)  Goal: Signs and Symptoms of Listed Potential Problems Will be Absent, Minimized or Managed (Stroke)  Signs and symptoms of listed potential problems will be absent, minimized or managed by discharge/transition of care (reference Stroke (Ischemic) (Adult) CPG).   Outcome: Ongoing (interventions implemented as appropriate)  Pt admitted to unit due to stroke signs and symptoms. Neuro WNL; spontaneous movements of upper and lower extremities. Speech clear/fluent. Swallowing without difficulty.  No distress/pain noted; no c/o of numbness and tingling in lower extremities. Pt uses cane for assistance in walking. Ambulated from stretcher to bed with minimum assist. Call light in reach. Will cont to monitor.     Problem: Fall Risk (Adult)  Goal: Identify Related Risk Factors and Signs and Symptoms  Related risk factors and signs and symptoms are identified upon initiation of Human Response Clinical Practice Guideline (CPG)   Outcome: Ongoing (interventions implemented as appropriate)  Fall risk and precautions discussed with pt and family. Acknowledged understanding. Fall precautions in place. No questions or concerns at present. Call light in reach. Will cont to monitor.     Problem: Patient Care Overview  Goal: Plan of Care Review  Outcome: Ongoing (interventions implemented as appropriate)  Plan of care discussed with pt. Questions and concerns addressed. No distress or pain present at time. Oriented to room and bed.

## 2017-12-08 NOTE — PLAN OF CARE
[facility]    HOME HEALTH ORDERS  FACE TO FACE ENCOUNTER    Patient Name: Sisi Medel  YOB: 1960    PCP: Carlos A Caputo MD   PCP Address: [address] / [place] [address]  PCP Phone Number: [phone]  PCP Fax: [phone]    Encounter Date: 12/08/2017    Admit to Home Health    Diagnoses:  Active Hospital Problems    Diagnosis  POA    Episode of transient neurologic symptoms [R29.90]  Unknown    Right leg weakness [R29.898]  Unknown    Dysarthria [R47.1]  Unknown    Permanent atrial fibrillation [I48.2]  Yes    Current use of long term anticoagulation [Z79.01]  Not Applicable    Hyperlipidemia [E78.5]  Yes     Chronic    ESRD on hemodialysis [N18.6, Z99.2]  Not Applicable     Chronic    Morbid obesity with BMI of 40.0-44.9, adult [E66.01, Z68.41]  Not Applicable     Chronic    Seizure disorder [G40.909]  Yes     Chronic    Pulmonary sarcoidosis [D86.0]  Yes     Chronic      Resolved Hospital Problems    Diagnosis Date Resolved POA   No resolved problems to display.       Future Appointments  Date Time Provider Department Center   12/11/2017 12:00 PM VASCULAR, LAB [facility] VASCLAB [facility]   12/13/2017 8:20 AM Emeka Lester MD [facility] NEURO [facility]   12/13/2017 9:45 AM Rand ChávezD [facility] COUMAD [facility]   1/16/2018 2:00 PM Denise Hamlin DPM [facility] POD [facility]   2/12/2018 8:00 AM HOME MONITOR DEVICE CHECK, [facility] ARRHYTH [facility]     Follow-up Information     Carlos A Caputo MD.    Specialty:  Internal Medicine  Why:  Dr. office will call you with an appt for 1-2 weeks from today. Please call the office by monday 12/11/17 if they do not call you to schedule an appt.  Contact information:  [address]  [place] [address]  [phone]             [facility].    Specialty:  Neurology  Why:  The office will call you to schedule an appt in 4-6 weeks. If the office does not call you by 12/15/17 then please call  the office to schedule an appt.  Contact information:  [address]  [facility] [id number] [phone]  Additional information:  7th Floor                   I have seen and examined this patient face to face today. My clinical findings that support the need for the home health skilled services and home bound status are the following:  Weakness/numbness causing balance and gait disturbance due to TIA making it taxing to leave home.    Allergies:  Review of patient's allergies indicates:   Allergen Reactions    Bactrim [sulfamethoxazole-trimethoprim] Other (See Comments)     Causes renal failure    Nsaids (non-steroidal anti-inflammatory drug) Other (See Comments)     Renal failure       Diet: diabetic diet: 1800 calorie    Activities: activity as tolerated    Nursing:   SN to complete comprehensive assessment including routine vital signs. Instruct on disease process and s/s of complications to report to MD. Review/verify medication list sent home with the patient at time of discharge  and instruct patient/caregiver as needed. Frequency may be adjusted depending on start of care date.    Notify MD if SBP > 160 or < 90; DBP > 90 or < 50; HR > 120 or < 50; Temp > 101; Other:         CONSULTS:    Physical Therapy to evaluate and treat. Evaluate for home safety and equipment needs; Establish/upgrade home exercise program. Perform / instruct on therapeutic exercises, gait training, transfer training, and Range of Motion.  Occupational Therapy to evaluate and treat. Evaluate home environment for safety and equipment needs. Perform/Instruct on transfers, ADL training, ROM, and therapeutic exercises.  Aide to provide assistance with personal care, ADLs, and vital signs.    MISCELLANEOUS CARE:  n/a    WOUND CARE ORDERS  n/a      Medications: Review discharge medications with patient and family and provide education.      Current Discharge Medication List      CONTINUE these medications which have NOT  CHANGED    Details   ACZONE 5 % topical gel Apply to face every morning  Refills: 2      ammonium lactate (LAC-HYDRIN) 12 % lotion Apply topically as needed (to legs).   Refills: 2      atorvastatin (LIPITOR) 80 MG tablet TAKE 1 TABLET(80 MG) BY MOUTH EVERY NIGHT  Qty: 90 tablet, Refills: 3    Associated Diagnoses: Chronic combined systolic and diastolic heart failure      cholecalciferol, vitamin D3, 1,000 unit capsule Take 1 capsule (1,000 Units total) by mouth once daily.  Refills: 0      clopidogrel (PLAVIX) 75 mg tablet 75 mg.      diclofenac sodium (SOLARAZE) 3 % gel       digoxin (LANOXIN) 125 mcg tablet Take 1 tablet (0.125 mg total) by mouth every other day.  Qty: 15 tablet, Refills: 1      doxepin (ZONALON) 5 % cream       econazole nitrate 1 % cream APPLY TOPICALLY TWICE DAILY AS NEEDED FOR FUNGAL SKIN INFECTIONS. USE FOR 2 TO 4 WEEKS  Qty: 30 g, Refills: 0      fludrocortisone (FLORINEF) 0.1 mg Tab Take 100 mcg by mouth 2 (two) times daily.      gabapentin (NEURONTIN) 300 MG capsule TK ONE C PO  TID  Refills: 2      ketorolac 0.5% (ACULAR) 0.5 % Drop instill one drop into both eyes every morning  Refills: 3      levetiracetam (KEPPRA) 500 MG Tab Take 1 tablet (500 mg total) by mouth 2 (two) times daily.  Qty: 60 tablet, Refills: 5    Associated Diagnoses: Seizure disorder      midodrine (PROAMATINE) 5 MG Tab Take 2 tablets (10 mg total) by mouth every Mon, Wed, Fri, Sun.  Qty: 32 tablet, Refills: 11      nortriptyline (PAMELOR) 25 MG capsule Take 1 capsule (25 mg total) by mouth every evening.  Qty: 30 capsule, Refills: 11    Associated Diagnoses: Paresthesias      omeprazole (PRILOSEC) 20 MG capsule TAKE 1 CAPSULE(20 MG) BY MOUTH EVERY DAY  Qty: 90 capsule, Refills: 3    Comments: **Patient requests 90 days supply**  Associated Diagnoses: Gastroesophageal reflux disease with esophagitis      polyethylene glycol (GLYCOLAX) 17 gram/dose powder Take 17 g by mouth once daily. Dissolve in juice.  Qty: 510 g,  Refills: 0      prednisoLONE acetate (PRED FORTE) 1 % DrpS INSTILL ONE DROP INTO  LEFT EYE THREE TIMES A DAY  Refills: 3      !! predniSONE (DELTASONE) 10 MG tablet Take 1 tablet (10 mg total) by mouth once daily.  Qty: 90 tablet, Refills: 3    Associated Diagnoses: Sarcoidosis of lung      !! predniSONE (DELTASONE) 10 MG tablet TAKE 1 TABLET BY MOUTH EVERY DAY WITH BREAKFAST  Qty: 90 tablet, Refills: 0    Associated Diagnoses: Sarcoidosis of lung      RENVELA 800 mg Tab TAKE 1 TABLET BY MOUTH THREE TIMES DAILY WITH MEALS  Qty: 90 tablet, Refills: 0      SENSIPAR 30 mg Tab       tizanidine 4 mg Cap TAKE 1 CAPSULE BY MOUTH DAILY AS NEEDED FOR MUSCLE SPASM  Qty: 90 capsule, Refills: 0    Comments: **Patient requests 90 days supply**  Associated Diagnoses: Muscle spasm      warfarin (COUMADIN) 5 MG tablet Take 1 tablet (5 mg total) by mouth Daily. Per [facility]  Qty: 90 tablet, Refills: 3    Comments: **Patient requests 90 days supply**  Associated Diagnoses: Long-term (current) use of anticoagulants; Paroxysmal atrial fibrillation       !! - Potential duplicate medications found. Please discuss with provider.      STOP taking these medications       traMADol (ULTRAM) 50 mg tablet Comments:   Reason for Stopping:               I certify that this patient is confined to her home and needs intermittent skilled nursing care, physical therapy and occupational therapy.

## 2017-12-08 NOTE — PLAN OF CARE
Problem: SLP Goal  Goal: SLP Goal  Outcome: Ongoing (interventions implemented as appropriate)  Regular diet with thin liquids.    Anabella Merlos MA/[facility]-SLP  Speech Language Pathologist  Pager [phone]  12/8/2017

## 2017-12-08 NOTE — PT/OT/SLP EVAL
Speech Language Pathology Evaluation  Cognitive/Bedside Swallow    Patient Name:  Sisi Medel   MRN:  [id number]  Admitting Diagnosis: <principal problem not specified>    Recommendations:                  General Recommendations:  dc home  Diet recommendations:  Regular, Thin   Aspiration Precautions: Standard aspiration precautions   General Precautions: Standard,    Communication strategies:  none    History:     Past Medical History:   Diagnosis Date    Anemia in ESRD (end-stage renal disease) 3/7/2016    Anticoagulant long-term use     Cervical radiculopathy 2015    CHF (congestive heart failure)     Chronic combined systolic and diastolic heart failure 2013    EF 20% , improved with Medical therapy, negative PET     Chronic respiratory failure with hypoxia 2013    On home oxygen at 2-5 liters    Closed fracture of proximal end of right fibula 2016    Complications due to renal dialysis device, implant, and graft     DDD (degenerative disc disease), lumbar 2015    Dependence on renal dialysis     ESRD on hemodialysis 2016    Essential hypertension     Gout     Lateral meniscal tear 2016    Lumbar stenosis 2015    Pacemaker     Paroxysmal atrial fibrillation 2014    Not on anticoagulation    Peripheral neuropathy 2013    Personal history of gastric ulcer 3/19/2013    Sarcoidosis, lung 2009    Diagnosed in  with ocular manifestation, on 4L home O2 and PO steroids    Secondary pulmonary hypertension 2015    Seizure disorder 3/19/2013    Seizures     Shingles 2013    Thyroid disease        Past Surgical History:   Procedure Laterality Date    ABDOMINAL SURGERY      CARDIAC PACEMAKER PLACEMENT       SECTION      x2    OTHER SURGICAL HISTORY      loop recorder implant    stent to fistula      VASCULAR SURGERY      fistula to L upper arm        Social History: Patient lives with family.    Prior  "diet: Regular with thin.        Subjective     "I am fine..I could go home"  Patient goals: go home     Pain/Comfort:  · Pain Rating 1: 0/10  · Pain Rating Post-Intervention 1: 0/10    Objective:     Cognitive Status:    Pt. was oriented x3 with good recall of recent and remote temporal and general information.  Immediate/delayed verbal recall was wfl with pt. Repeating 7 digits and 5 words without difficulty.    Responses to hypothetical verbal problem solving tasks were accurate and complete.  Pt. Compared and contrasted objects and generated multiple solutions to problems.  Thought organization and categorization skills were wfl with pt. Naming 15 animals given one minute when 15-20 are wnl.  Pt. Responded to functional math and time calculations with 100% accuracy and sequenced 4 words presented auditorily on 2/2 trials.        Receptive Language:   Comprehension:   Pt. Responded to complex yes/no questions and multi step commands with 100% accuracy and no delays in responding.        Pragmatics:    wnl    Expressive Language:  Verbal:    Verbal language skills were wfl with no evidence of aphasia.  Pt. Expressed their thoughts coherently in conversation with no evidence of confusion or word finding deficits        Motor Speech:  WFL    Voice:   WFL    Visual-Spatial:  WFL    Reading:   WFL     Written Expression:   WFL    Oral Musculature Evaluation  · Oral Musculature: WNL  · Dentition: present and adequate  · Mucosal Quality: good  · Mandibular Strength and Mobility: WNL  · Oral Labial Strength and Mobility: WNL  · Lingual Strength and Mobility: WNL  · Velar Elevation: WNL  · Buccal Strength and Mobility: WNL  · Volitional Cough: strong  · Volitional Swallow: no delay  · Voice Prior to PO Intake: wnl    Bedside Swallow Eval:   Consistencies Assessed:  · Thin liquids 1 cup water  · Solids 1 cracker     Oral Phase:   · WNL    Pharyngeal Phase:   · no overt clinical signs/symptoms of aspiration  · no overt " clinical signs/symptoms of pharyngeal dysphagia    Compensatory Strategies  · None    Treatment:     Assessment:     Sisi Medel is a 57 y.o. female with speech language and cognitive skills wnl.     Goals:    SLP Goals        Problem: SLP Goal    Goal Priority Disciplines Outcome   SLP Goal     SLP                    Plan:     · Patient to be seen:      · Plan of Care expires:     · Plan of Care reviewed with:  patient   · SLP Follow-Up:  No       Discharge recommendations:  Discharge Facility/Level Of Care Needs: home   Barriers to Discharge:  none    Time Tracking:     SLP Treatment Date:   12/08/17  Speech Start Time:  1122  Speech Stop Time:  1138     Speech Total Time (min):  16 min    Billable Minutes: Eval 8  and Eval Swallow and Oral Function 8    Anabella Merlos MA, CCC-SLP  12/08/2017

## 2017-12-08 NOTE — PT/OT/SLP EVAL
Physical Therapy Evaluation    Patient Name:  Sisi Medel   MRN:  [id number]    Recommendations:     Discharge Recommendations:  home health PT   Discharge Equipment Recommendations: none   Barriers to discharge: None    Assessment:     Sisi Medel is a 57 y.o. female admitted with a medical diagnosis of  TIA.  She presents with the following impairments/functional limitations:  weakness, gait instability, impaired endurance, impaired balance, impaired functional mobilty, impaired cardiopulmonary response to activity, impaired sensation, impaired self care skills.  Due to these impairments, she now requires min assistance to contact guard assist with gait and stair climbing.  Patient is safe to d/c home when medically appropriate and f/u with HH therapy.  During her hospital stay she would benefit from skilled PT services to progress mobility and endurance and improve safety in preparation for d/c home.     Rehab Prognosis:  good; patient would benefit from acute skilled PT services to address these deficits and reach maximum level of function.      Recent Surgery: * No surgery found *      Plan:     During this hospitalization, patient to be seen 5 x/week to address the above listed problems via gait training, therapeutic activities, therapeutic exercises, neuromuscular re-education  · Plan of Care Expires:  01/08/18   Plan of Care Reviewed with: patient    Subjective     Communicated with RN prior to session.  Patient found sitting EOB upon PT entry to room, agreeable to evaluation.      Chief Complaint: none stated  Patient comments/goals: return home  Pain/Comfort:  · Pain Rating 1: 0/10  · Pain Rating Post-Intervention 1: 0/10      Living Environment:  Patient lives with her fiance in [place], [place] in a double shotgun home with 5 steps and bilateral rails to enter.  Patient was modified independent prior to admit and used DME.  She used small base quad cane for household ambulation and 4  wheel walker for community ambulation.      Prior to admission, patients level of function was modified independent with DME.  Patient has the following equipment:  (small base quad cane, 4 wheel walker) BSC and RW.   Upon discharge, patient will have assistance from her fiance.    Objective:     Patient found with: telemetry     General Precautions: Standard, aspiration, fall   Pt was found sitting EOB with RN.  She participated well in therapy and demonstrated stair climbing.  She experienced oxygen desaturation to 86% on 4 L O2 after stair climbing, but recovered quickly (< 1 minute) with standing rest break to above 88%.  She currently has home O2       PHYSICAL EXAMINATION  Cognitive Function:  - Oriented to: person, place, time and situation  - Level of Alertness: awake and alert  - Follows Commands/attention: Follows multistep  commands  - Communication: clear/fluent  - Safety awareness/insight to disability: intact  Musculoskeletal System  Upper Extremity   ROM: WFL  Strength: WFL  Lower Extremities:  ROM: WFL  Strength:  Muscle Group R LE L LE Comments   Hip flexion  4/5 4/5 Long term steroid use may be a contributing factor   Hip ext. 4/5 4/5 Long term steroid use may be a contributing factor      Knee flexion  5/5 5/5       Knee ext. 5/5 5/5    Ankle DF 5/5 5/5    Ankle PF 5/5 5/5    Integumentary System: Visible skin intact  Cardiopulmonary System:   - Edema: none stated  - SpO2: 95% on 3L at rest; 86% on 4L after stair training  Neuromuscular System:  - Sensation: impaired LT R foot, chronic neuropathy  Posture and gross symmetry: rounded shoulders and forward    BALANCE:  Sitting:  - Static: GOOD+: Takes MAXIMAL challenges from all directions.  ; good midline orientation   - Dynamic: GOOD+: Maintains balance through MAXIMAL excursions of active trunk motion;   Standing:  - Static Stand: FAIR+: Takes MINIMAL challenges from all directions; cane for balance  - Dynamic stand: FAIR: Needs CONTACT GUARD  during gait; see gait    FUNCTIONAL MOBILITY ASSESSMENT:  Transfers:  - Sit <> stand transfer: stand by assist    - Bed <> chair transfer: contact guard assist     Gait: 150 feet with min assist to contact guard assist with small base quad cane  - Patient demonstrated 2 point gait pattern with small base quad cane and 1 episode of instability and LOB initially.    - She exhibited decreased balance, gait instability, excessive lateral lean in stance, and decreased gait speed.      Stairs:  Pt ascended/descended 5 step(s) with L handrail(s) and cane in R hand using contact guard assist.    O2 dropped to 86% on 4L after stairs     THERAPEUTIC ACTIVITIES AND EXERCISES:  Therapist educated patient on the role of PT, POC, and therapy recommendations.  Therapist discussed the patients current mobility status and level of assistance.  Therapist educated patient on proper height of AD, provided demonstration and adjusted cane height.  Therapist answered questions to patient/familys satisfaction within scope of practice.  White board updated to reflect current level of assistance.     Pt is safe to perform transfers with nursing using contact guard assist and cane.  Pt is safe to ambulate with nursing using contact guard assist and quad cane.       AM-PAC 6 CLICK MOBILITY  Total Score:22     Patient left sitting EOB with all lines intact, call button in reach and RN notified.    GOALS:    Physical Therapy Goals        Problem: Physical Therapy Goal    Goal Priority Disciplines Outcome Goal Variances Interventions   Physical Therapy Goal     PT/OT, PT Ongoing (interventions implemented as appropriate)     Description:  1. Pt will perform sit to stand transfers with independence.    2. Pt will perform bed <> chair transfers with independence with and without small base quad cane.  3. Pt will perform gait x 150 feet with stand by assist and no LOB using small base quad cane.  4. Pt will ascend/descend 5 steps with L rail and  stand by assist to access home environment safely.     Goals to be met by 12/15/2017                    History:     Past Medical History:   Diagnosis Date    Anemia in ESRD (end-stage renal disease) 3/7/2016    Anticoagulant long-term use     Cervical radiculopathy 2015    CHF (congestive heart failure)     Chronic combined systolic and diastolic heart failure 2013    EF 20% , improved with Medical therapy, negative PET     Chronic respiratory failure with hypoxia 2013    On home oxygen at 2-5 liters    Closed fracture of proximal end of right fibula 2016    Complications due to renal dialysis device, implant, and graft     DDD (degenerative disc disease), lumbar 2015    Dependence on renal dialysis     ESRD on hemodialysis 2016    Essential hypertension     Gout     Lateral meniscal tear 2016    Lumbar stenosis 2015    Pacemaker     Paroxysmal atrial fibrillation 2014    Not on anticoagulation    Peripheral neuropathy 2013    Personal history of gastric ulcer 3/19/2013    Sarcoidosis, lung 2009    Diagnosed in  with ocular manifestation, on 4L home O2 and PO steroids    Secondary pulmonary hypertension 2015    Seizure disorder 3/19/2013    Seizures     Shingles 2013    Thyroid disease        Past Surgical History:   Procedure Laterality Date    ABDOMINAL SURGERY      CARDIAC PACEMAKER PLACEMENT       SECTION      x2    OTHER SURGICAL HISTORY      loop recorder implant    stent to fistula      VASCULAR SURGERY      fistula to L upper arm        Clinical Decision Making:     Comorbidities and personal factors that affect the PT plan of care or the patient's ability to participate or progress with therapy:  1. A-fib s/p pacemaker  2. Morbid obesity  3. ESRD on HD  4. Seizure dx  5. Pulmonary sarcoidosis with long term steroid use  6. CHF  7. Chronic gait  8. Polyneuropathy   9. Osteopenia    Clinical  Presentation: stable and/or uncomplicated  Level of Complexity:   Low Complexity  · No personal factors or comorbidities that impact the plan of care  · Examination addressing 1-2 body structures and functions, activity limitations, and/or participation restrictions  · Clinical presentation with stable or uncomplicated characteristics     Time Tracking:     PT Received On: 12/08/17  PT Start Time: 1005     PT Stop Time: 1040  PT Total Time (min): 35 min     Billable Minutes: Evaluation 15 and Gait Training 10      Huma Booker, PT  12/08/2017

## 2017-12-08 NOTE — ASSESSMENT & PLAN NOTE
Sisi Medel is a 57 y.o. female who presented from dialysis with episode of unresponsiveness likely 2/2 hypotension. Patient woke up and was experiencing dysarthria and RLE weakness/numbness. Initial NIH was 5. CT was negative for acute abnormalities. She is unable to have and MRI 2/2 pacemaker. Patient's states her symptoms resolved while in ED. NIH now currently 0. 12/8 CTA showed no acute abnormalities.     Antiplatelets: continue home plavix and coumadin  Statins: continue home atorvastatin  SBP <180  VTE ppx: patient on coumadin  PT/OT Home Health

## 2017-12-08 NOTE — NURSING
Pt transported via stretcher from ED ambulated to bed with minimum assist. No distress or pain noted. AAOx4.

## 2017-12-08 NOTE — CONSULTS
Inpatient consult to Physical Medicine Rehab  Consult performed by: HANH HANNA  Consult ordered by: AMIRA BOWDEN  Reason for consult: assess rehab needs        Reviewed patient history and current admission.  Rehab team following.  Full consult to follow.    AYAN Bhandari, FNP-C  Physical Medicine & Rehabilitation   12/08/2017  Spectralink: [id number]

## 2017-12-08 NOTE — PROGRESS NOTES
Discharge instructions reviewed with pt. Pt in possession of all belongings. IVs and tele removed at discharge. Pt family member bringing up wheelchair. NAD.

## 2017-12-08 NOTE — PLAN OF CARE
Problem: Physical Therapy Goal  Goal: Physical Therapy Goal  Outcome: Ongoing (interventions implemented as appropriate)  Initial eval completed.  Results, POC, and therapy recommendations discussed with patient.  Complete evaluation documentation to follow.     Pt requires contact guard assistance to supervision with transfers at this time. She is safe to walk short distances with cane and contact guard assist from nursing.     Huma Booker, PT  12/8/2017  [phone] (pager)

## 2017-12-08 NOTE — PT/OT/SLP EVAL
"Occupational Therapy   Evaluation/Discharge Summary    Name: Sisi Medel  MRN: [id number]  Admitting Diagnosis:  Episode of transient neurologic symptoms      Recommendations:     Discharge Recommendations: home (no OT needed)  Discharge Equipment Recommendations:  walker, rolling      History:     Occupational Profile:  Living Environment: Pt resides with fiance in house apartment with 5 steps 2 rails to enter.  Previous level of function: Pt was independent with ADL's & ambulation.  Pt did not use AD for ambulation in house however at times would use furniture.  Pt has a low toilet & bathtub at home.  Pt used rollator or QC for ambulation in the community.    Roles and Routines: mother, grandmother, aunt, listening to music, "take a ride," shop, cooking, cleaning. Paying bills  Equipment Owned:   (O2 3-5L, QC, rollator)  Assistance upon Discharge: per pt, fiance    Past Medical History:   Diagnosis Date    Anemia in ESRD (end-stage renal disease) 3/7/2016    Anticoagulant long-term use     Cervical radiculopathy 7/20/2015    CHF (congestive heart failure)     Chronic combined systolic and diastolic heart failure 6/14/2013    EF 20% 2013, improved with Medical therapy, negative PET 2013    Chronic respiratory failure with hypoxia 04/22/2013    On home oxygen at 2-5 liters    Closed fracture of proximal end of right fibula 6/27/2016    Complications due to renal dialysis device, implant, and graft     DDD (degenerative disc disease), lumbar 6/17/2015    Dependence on renal dialysis     Diabetes mellitus type II, controlled 12/8/2017    ESRD on hemodialysis 2/23/2016    Essential hypertension     Gout     Lateral meniscal tear 5/31/2016    Lumbar stenosis 6/17/2015    Pacemaker     Paroxysmal atrial fibrillation 5/16/2014    Not on anticoagulation    Peripheral neuropathy 11/13/2013    Personal history of gastric ulcer 3/19/2013    Sarcoidosis, lung 2009    Diagnosed in 2009 with ocular " manifestation, on 4L home O2 and PO steroids    Secondary pulmonary hypertension 2015    Seizure disorder 3/19/2013    Seizures     Shingles 2013    Thyroid disease        Past Surgical History:   Procedure Laterality Date    ABDOMINAL SURGERY      CARDIAC PACEMAKER PLACEMENT       SECTION      x2    OTHER SURGICAL HISTORY      loop recorder implant    stent to fistula      VASCULAR SURGERY      fistula to L upper arm        Subjective   Pt reported that she got dizzy during ambulation in room.  Chief Complaint: dizziness during ambulation  Patient/Family stated goals: none stated  Communicated with: RN prior to session.  Pain/Comfort:  · Pain Rating 1: 0/10  · Pain Rating Post-Intervention 1: 0/10    Objective:     Patient found with: telemetry, oxygen    General Precautions: Standard, aspiration, fall (cardiac)     Occupational Performance:    Bed Mobility:    · Patient completed Supine to Sit with modified independence    Functional Mobility/Transfers:  · Patient completed Sit <> Stand Transfer with supervision  with  quad cane and from EOB     Activities of Daily Living:  · UB Dressing: modified independence while seated EOB  · LB Dressing: modified independence donning socks while seated EOB    Cognitive/Visual Perceptual:  Cognitive/Psychosocial Skills:     -       Oriented to: Person, Place, Time and Situation   -       Follows Commands/attention:Follows multistep  commands  -       Communication: clear/fluent  -       Memory: No Deficits noted  -       Safety awareness/insight to disability: intact   -       Mood/Affect/Coping skills/emotional control: Appropriate to situation  Visual/Perceptual:      -Intact    Physical Exam:  Postural examination/scapula alignment:    -       Rounded shoulders  -       Forward head  -       Posterior pelvic tilt  Skin integrity: Visible skin intact  Edema:  None noted  Sensation:    -       Intact  light/touch BUE  Dominant hand:    -        "right  Upper Extremity Range of Motion:     -       Right Upper Extremity: WFL  -       Left Upper Extremity: WFL  Upper Extremity Strength:    -       Right Upper Extremity: WFL  -       Left Upper Extremity: WFL   Strength:    -       Right Upper Extremity: WFL  -       Left Upper Extremity: WFL  Fine Motor Coordination:    -       Intact  Left hand thumb/finger opposition skills and Right hand thumb/finger opposition skills  Gross motor coordination:   WFL    Patient left seated EOB with all lines intact, call button in reach, RN notified and white board updated.    [facility] 6 Click:  [facility] Total Score: 24    Treatment & Education:  Provided extensive education on role of OT, POC, discharge recommendations, potential need to use RW at home, safety during ambulation due to dizziness, & signs of stroke with pt verbalizing understanding.  Pt performed functional mobility in room during ADL's with SBA using QC.  Education:    Assessment:     Sisi Medel is a 57 y.o. female with a medical diagnosis of Episode of transient neurologic symptoms.  She presents with fair participation and motivation.  Pt currently does not require skilled OT & would benefit from continued PT for ambulation.      Rehab Prognosis:  good         Clinical Decision Makin.  OT Low:  "Pt evaluation falls under low complexity for evaluation coding due to performance deficits noted in 1-3 areas as stated above and 0 co-morbities affecting current functional status. Data obtained from problem focused assessments. No modifications or assistance was required for completion of evaluation. Only brief occupational profile and history review completed."     Plan:     Patient to be seen   to address the above listed problems via    · Plan of Care Expires:    · Plan of Care Reviewed with: patient    This Plan of care has been discussed with the patient who was involved in its development and understands and is in agreement with the " identified goals and treatment plan    GOALS:    Occupational Therapy Goals     Not on file                Time Tracking:     OT Date of Treatment: 12/08/17  OT Start Time: 0731  OT Stop Time: 0809  OT Total Time (min): 38 min    Billable Minutes:Evaluation 25  Therapeutic Activity 13    BALTA Morelos  12/8/2017

## 2017-12-08 NOTE — PLAN OF CARE
Problem: Patient Care Overview  Goal: Plan of Care Review  Outcome: Outcome(s) achieved Date Met: 12/08/17  OT eval completed.  No goals set due to pt at Mod (I) with ADL's assessed.  Pt to be followed for ambulation.  Discharge OT on acute.  BALTA Morelos 12/8/2017

## 2017-12-08 NOTE — DISCHARGE SUMMARY
[facility]  Vascular Neurology  Comprehensive Stroke Center  Discharge Summary     Summary:     Admit Date: 12/7/2017 11:26 AM    Discharge Date and Time:  12/08/2017 1:52 PM    Attending Physician: Thaddeus Alfaro MD     Discharge Provider: Najma Garner MD    History of Present Illness: Sisi Medel is a 57 y.o. female with PMHx of HF, pulmonary sarcoidosis, ESRD, HLD, a fib (coumadin), and neuropathy who presented to ED with dysarthria and RLE weakness/numbness. Patient was at dialysis and was found unresponsive with SBP in 90s. She was given a fluid bolus and began to wake up and respond. Once awake, she was noted to have slurred speech and RLE weakness. Patient was last known normal around 10am.    Hospital Course (synopsis of major diagnoses, care, treatment, and services provided during the course of the hospital stay): Patient presented from dialysis on 12/7/17 after experiencing dysarthria and RLE weakness/numbness after an episode of unresponsiveness likely 2/2 hypotension. Her initial NIH was 5, but patient states her symptoms had resolved while in the ED where her NIH was found to be 0. CT was negative for acute abnormalities. She was unable to have and MRI 2/2 pacemaker. Patient's A1c was 6.5 suggesting newly diagnosed diabetes and her LDL was 100.  CTA shows no acute or vascular abnormalities .  It did show a multinodular thyroid for which patient can have an US as outpatient.  PT/OT evaluated patient and think she would benefit from HH. Patient's symptoms have completely resolved, and she is stable for discharge home with home health PT/OT/ intermittent skilled nursing.      STROKE DOCUMENTATION   Acute Stroke Times   Last Known Normal Date: 12/07/17  Symptom Onset Date: 12/07/17  Stroke Team Called Date: 12/07/17  Stroke Team Called Time: 1126  Stroke Team Arrival Date: 12/07/17  Stroke Team Arrival Time: 1130  CT Interpretation Time: 1153  Decision to Treat  Time for Alteplase: 1153  Decision to Treat Time for IR: 1153     NIH Scale:  1a. Level Of Consciousness: 0-->Alert: keenly responsive  1b. LOC Questions: 0-->Answers both questions correctly  1c. LOC Commands: 0-->Performs both tasks correctly  2. Best Gaze: 0-->Normal  3. Visual: 0-->No visual loss  4. Facial Palsy: 0-->Normal symmetrical movements  5a. Motor Arm, Left: 0-->No drift: limb holds 90 (or 45) degrees for full 10 secs  5b. Motor Arm, Right: 0-->No drift: limb holds 90 (or 45) degrees for full 10 secs  6a. Motor Leg, Left: 0-->No drift: leg holds 30 degree position for full 5 secs  6b. Motor Leg, Right: 0-->No drift: leg holds 30 degree position for full 5 secs  7. Limb Ataxia: 0-->Absent  8. Sensory: 0-->Normal: no sensory loss  9. Best Language: 0-->No aphasia: normal  10. Dysarthria: 0-->Normal  11. Extinction and Inattention (formerly Neglect): 0-->No abnormality  Total (NIH Stroke Scale): 0        Modified Ganesh Score: 3  Almaz Coma Scale:    ABCD2 Score:    [id number] Score:   HAS -BLED Score:   ICH Score:   Hunt & Law Classification:     Patient seen and examined on day of discharge.  Vital signs reviewed and exam as follows:  Constitutional: She is oriented to person, place, and time. She appears well-developed and well-nourished. No distress.   HENT:   Head: Normocephalic and atraumatic.   Eyes: EOM are normal. Pupils are equal, round, and reactive to light.   Cardiovascular: Normal rate.    Pulmonary/Chest: Effort normal.   Neurological: She is alert and oriented to person, place, and time.   Skin: Skin is warm and dry.   Vitals reviewed.        Neurological Exam:   LOC: alert  Attention Span: Good   Language: No aphasia  Articulation: No dysarthria  Orientation: Person, Place, Time   Visual Fields: Full  EOM (CN III, IV, VI): Full/intact  Pupils (CN II, III): PERRL  Facial Sensation (CN V): Normal  Facial Movement (CN VII): Symmetric facial expression    Motor: Arm left Normal  (5/5)  Leg left Normal (5/5)  Arm right Normal (5/5)  Leg right 5/5  Cebellar: No evidence of appendicular or axial ataxia  Sensation: normal  Tone: Normal tone throughout    Assessment/Plan:     Diagnostic Results:    CT Head. Date: 12/07/17  No evidence of acute intracranial pathology.      CTA Head and Neck 12/8/17  No acute intracranial normality.    CT angiogram of the head and neck demonstrates minimal atherosclerosis without significant vascular abnormality.     Multinodular thyroid. Recommend further nonemergent evaluation with ultrasound.    Cardiomegaly and patchy groundglass opacities which may reflect pulmonary edema. Findings may be seen with congestive heart failure.     Interventions: None    Complications: None    Disposition: Home or Self Care    Final Active Diagnoses:    Diagnosis Date Noted POA    PRINCIPAL PROBLEM:  Episode of transient neurologic symptoms [R29.90] 12/07/2017 Unknown    Multinodular thyroid [E04.2] 12/08/2017 Unknown    Diabetes mellitus type II, controlled [E11.9] 12/08/2017 Unknown    Right leg weakness [R29.898] 12/07/2017 Unknown    Dysarthria [R47.1] 12/07/2017 Unknown    Permanent atrial fibrillation [I48.2] 06/12/2017 Yes    Current use of long term anticoagulation [Z79.01] 07/05/2016 Not Applicable    Hyperlipidemia [E78.5] 03/07/2016 Yes     Chronic    ESRD on hemodialysis [N18.6, Z99.2] 02/23/2016 Not Applicable     Chronic    Morbid obesity with BMI of 40.0-44.9, adult [E66.01, Z68.41] 08/15/2013 Not Applicable     Chronic    Seizure disorder [G40.909] 03/19/2013 Yes     Chronic    Pulmonary sarcoidosis [D86.0] 03/19/2013 Yes     Chronic      Problems Resolved During this Admission:    Diagnosis Date Noted Date Resolved POA     * Episode of transient neurologic symptoms    Sisi Medel is a 57 y.o. female who presented from dialysis with episode of unresponsiveness likely 2/2 hypotension. Patient woke up and was experiencing dysarthria and RLE  weakness/numbness. Initial NIH was 5. CT was negative for acute abnormalities. She is unable to have and MRI 2/2 pacemaker. Patient's states her symptoms resolved while in ED. NIH now currently 0. 12/8 CTA showed no acute abnormalities.     Antiplatelets: continue home plavix and coumadin  Statins: continue home atorvastatin  SBP <180  VTE ppx: patient on coumadin  PT/OT Home Health            Diabetes mellitus type II, controlled    A1c 6.5 on admission; No h/o DMII but sugars acceptable while in hospital  Will need to follow up with pcp        Multinodular thyroid    Seen on CTA.  Placed order for Thyroid US as outpatient            Recommendations:     Post-discharge complication risks: None    Stroke Education given to: patient    Follow-up in Stroke Clinic in 4-6 weeks     Discharge Plan:  Antithrombotics: Clopidogrel 75mg, Coumadin  AntiHLD: Lipitor 80    Follow Up:  Follow-up Information     Carlos A Caputo MD.    Specialty:  Internal Medicine  Why:  Dr. office will call you with an appt for 1-2 weeks from today. Please call the office by monday 12/11/17 if they do not call you to schedule an appt.  Contact information:  [address]  [facility] [id number] [phone]             [facility].    Specialty:  Neurology  Why:  The office will call you to schedule an appt in 4-6 weeks. If the office does not call you by 12/15/17 then please call the office to schedule an appt.  Contact information:  [address]  [facility] [id number] [phone]  Additional information:  7th Floor                 Patient Instructions:     US Thyroid   Standing Status: Future  Standing Exp. Date: 12/08/18   Order Specific Question Answer Comments   May the Radiologist modify the order per protocol to meet the clinical needs of the patient? Yes      Diet Diabetic 1800 Calories     Call MD for:  increased confusion or weakness     Call MD for:  persistent dizziness, light-headedness,  or visual disturbances     Call MD for:  severe persistent headache     Call MD for:  difficulty breathing or increased cough     Call MD for:  severe uncontrolled pain         Medications:  Reconciled Home Medications:   Current Discharge Medication List      CONTINUE these medications which have NOT CHANGED    Details   ACZONE 5 % topical gel Apply to face every morning  Refills: 2      ammonium lactate (LAC-HYDRIN) 12 % lotion Apply topically as needed (to legs).   Refills: 2      atorvastatin (LIPITOR) 80 MG tablet TAKE 1 TABLET(80 MG) BY MOUTH EVERY NIGHT  Qty: 90 tablet, Refills: 3    Associated Diagnoses: Chronic combined systolic and diastolic heart failure      cholecalciferol, vitamin D3, 1,000 unit capsule Take 1 capsule (1,000 Units total) by mouth once daily.  Refills: 0      clopidogrel (PLAVIX) 75 mg tablet 75 mg.      diclofenac sodium (SOLARAZE) 3 % gel       digoxin (LANOXIN) 125 mcg tablet Take 1 tablet (0.125 mg total) by mouth every other day.  Qty: 15 tablet, Refills: 1      doxepin (ZONALON) 5 % cream       econazole nitrate 1 % cream APPLY TOPICALLY TWICE DAILY AS NEEDED FOR FUNGAL SKIN INFECTIONS. USE FOR 2 TO 4 WEEKS  Qty: 30 g, Refills: 0      fludrocortisone (FLORINEF) 0.1 mg Tab Take 100 mcg by mouth 2 (two) times daily.      gabapentin (NEURONTIN) 300 MG capsule TK ONE C PO  TID  Refills: 2      ketorolac 0.5% (ACULAR) 0.5 % Drop instill one drop into both eyes every morning  Refills: 3      levetiracetam (KEPPRA) 500 MG Tab Take 1 tablet (500 mg total) by mouth 2 (two) times daily.  Qty: 60 tablet, Refills: 5    Associated Diagnoses: Seizure disorder      midodrine (PROAMATINE) 5 MG Tab Take 2 tablets (10 mg total) by mouth every Mon, Wed, Fri, Sun.  Qty: 32 tablet, Refills: 11      nortriptyline (PAMELOR) 25 MG capsule Take 1 capsule (25 mg total) by mouth every evening.  Qty: 30 capsule, Refills: 11    Associated Diagnoses: Paresthesias      omeprazole (PRILOSEC) 20 MG capsule TAKE 1  CAPSULE(20 MG) BY MOUTH EVERY DAY  Qty: 90 capsule, Refills: 3    Comments: **Patient requests 90 days supply**  Associated Diagnoses: Gastroesophageal reflux disease with esophagitis      polyethylene glycol (GLYCOLAX) 17 gram/dose powder Take 17 g by mouth once daily. Dissolve in juice.  Qty: 510 g, Refills: 0      prednisoLONE acetate (PRED FORTE) 1 % DrpS INSTILL ONE DROP INTO  LEFT EYE THREE TIMES A DAY  Refills: 3      !! predniSONE (DELTASONE) 10 MG tablet Take 1 tablet (10 mg total) by mouth once daily.  Qty: 90 tablet, Refills: 3    Associated Diagnoses: Sarcoidosis of lung      !! predniSONE (DELTASONE) 10 MG tablet TAKE 1 TABLET BY MOUTH EVERY DAY WITH BREAKFAST  Qty: 90 tablet, Refills: 0    Associated Diagnoses: Sarcoidosis of lung      RENVELA 800 mg Tab TAKE 1 TABLET BY MOUTH THREE TIMES DAILY WITH MEALS  Qty: 90 tablet, Refills: 0      SENSIPAR 30 mg Tab       tizanidine 4 mg Cap TAKE 1 CAPSULE BY MOUTH DAILY AS NEEDED FOR MUSCLE SPASM  Qty: 90 capsule, Refills: 0    Comments: **Patient requests 90 days supply**  Associated Diagnoses: Muscle spasm      warfarin (COUMADIN) 5 MG tablet Take 1 tablet (5 mg total) by mouth Daily. Per [facility]madin Clinic  Qty: 90 tablet, Refills: 3    Comments: **Patient requests 90 days supply**  Associated Diagnoses: Long-term (current) use of anticoagulants; Paroxysmal atrial fibrillation       !! - Potential duplicate medications found. Please discuss with provider.      STOP taking these medications       traMADol (ULTRAM) 50 mg tablet Comments:   Reason for Stopping:               Najma Garner MD  [facility]  Department of Vascular Neurology   [facility]

## 2017-12-08 NOTE — CONSULTS
[facility]  Physical Medicine & Rehab  Consult Note    Patient Name: Sisi Medel  MRN: [id number]  Admission Date: 12/7/2017  Hospital Length of Stay: 0 days  Attending Physician: Thaddeus Alfaro MD     Inpatient consult to Physical Medicine & Rehabilitation  Consult performed by: Syeda Willett NP  Consult requested by:  Thaddeus Alfaro MD    Reason for Consult:  assess rehabilitation needs    Consults  Subjective:     Principal Problem: Morbid Obesity    HPI: Sisi Medel is a 57-year-old female with PMHx of pacemaker, ESRD (on dialysis), CHF, pulmonary sarcoidosis, HLD, A-fib (on Coumadin), and neuropathy.  [place] [facility] [place] after being found unresponsive at dialysis with SBP in 90s.  Upon arrival to ED, she has dysarthria and RLE weakness and numbness. She was given a fluid bolus and began to respond.  CTH revealed no acute pathology. She is unable to have a MRI 2/2 pacemaker.  CTA head/neck and Retroperitoneal US pending.      Functional History: Per patient, lives in [place] with [facility]' in a single story home with 4-5 steps to enter.  Prior to admission, (I) with ADLs and utilized a rollator and SC for ambulation.  DME: BSC, SC, and rollator.      Hospital Course: 12/8/17: Therapy evaluations pending.     Past Medical History:   Diagnosis Date    Anemia in ESRD (end-stage renal disease) 3/7/2016    Anticoagulant long-term use     Cervical radiculopathy 7/20/2015    CHF (congestive heart failure)     Chronic combined systolic and diastolic heart failure 6/14/2013    EF 20% 2013, improved with Medical therapy, negative PET 2013    Chronic respiratory failure with hypoxia 04/22/2013    On home oxygen at 2-5 liters    Closed fracture of proximal end of right fibula 6/27/2016    Complications due to renal dialysis device, implant, and graft     DDD (degenerative disc disease), lumbar 6/17/2015    Dependence on renal dialysis     ESRD on  hemodialysis 2016    Essential hypertension     Gout     Lateral meniscal tear 2016    Lumbar stenosis 2015    Pacemaker     Paroxysmal atrial fibrillation 2014    Not on anticoagulation    Peripheral neuropathy 2013    Personal history of gastric ulcer 3/19/2013    Sarcoidosis, lung 2009    Diagnosed in  with ocular manifestation, on 4L home O2 and PO steroids    Secondary pulmonary hypertension 2015    Seizure disorder 3/19/2013    Seizures     Shingles 2013    Thyroid disease      Past Surgical History:   Procedure Laterality Date    ABDOMINAL SURGERY      CARDIAC PACEMAKER PLACEMENT       SECTION      x2    OTHER SURGICAL HISTORY      loop recorder implant    stent to fistula      VASCULAR SURGERY      fistula to L upper arm      Review of patient's allergies indicates:   Allergen Reactions    Bactrim [sulfamethoxazole-trimethoprim] Other (See Comments)     Causes renal failure    Nsaids (non-steroidal anti-inflammatory drug) Other (See Comments)     Renal failure       Scheduled Medications:    sodium chloride 0.9%   Intravenous Once    atorvastatin  80 mg Oral Daily    cinacalcet  30 mg Oral Daily with breakfast    clopidogrel  75 mg Oral Daily    digoxin  0.125 mg Oral Every other day    fludrocortisone  100 mcg Oral BID    gabapentin  300 mg Oral TID    ketorolac 0.5%  1 drop Both Eyes Daily    levETIRAcetam  500 mg Oral BID    midodrine  10 mg Oral Every Mon, Wed, Fri, Sun    nortriptyline  25 mg Oral QHS    pantoprazole  40 mg Oral Daily    polyethylene glycol  17 g Oral Daily    prednisoLONE acetate  1 drop Left Eye TID    predniSONE  10 mg Oral Daily    sevelamer carbonate  1,600 mg Oral TID WM    sodium chloride 0.9%  3 mL Intravenous Q8H    vitamin D  1,000 Units Oral Daily    warfarin  5 mg Oral Daily       PRN Medications: sodium chloride 0.9%, labetalol, sodium chloride 0.9%    Family History     Problem  Relation (Age of Onset)    Coronary artery disease Father    Diabetes Mother, Father, Maternal Grandmother    Hypertension Mother, Father    Kidney failure Mother    Lupus Sister        Social History Main Topics    Smoking status: Former Smoker     Packs/day: 0.50     Years: 10.00     Types: Cigarettes     Quit date: 4/22/1994    Smokeless tobacco: Never Used    Alcohol use No      Comment: rarely    Drug use: No    Sexual activity: Not Currently     Review of Systems   Constitutional: Positive for fatigue. Negative for chills and fever.   HENT: Negative for trouble swallowing and voice change.    Eyes: Negative for photophobia and visual disturbance.   Respiratory: Negative for cough, shortness of breath and wheezing.    Cardiovascular: Negative for chest pain and palpitations.   Gastrointestinal: Negative for abdominal distention, nausea and vomiting.   Genitourinary: Negative for difficulty urinating and flank pain.   Musculoskeletal: Positive for gait problem. Negative for arthralgias.   Skin: Negative for color change and rash.   Neurological: Positive for numbness (R foot). Negative for facial asymmetry, speech difficulty, weakness and headaches.   Psychiatric/Behavioral: Negative for agitation and confusion.     Objective:     Vital Signs (Most Recent):  Temp: 97.8 °F (36.6 °C) (12/08/17 0749)  Pulse: 99 (12/08/17 1000)  Resp: 18 (12/08/17 0749)  BP: 113/72 (12/08/17 0749)  SpO2: 97 % (12/08/17 0749)    Vital Signs (24h Range):  Temp:  [97.5 °F (36.4 °C)-98.7 °F (37.1 °C)] 97.8 °F (36.6 °C)  Pulse:  [69-99] 99  Resp:  [16-19] 18  SpO2:  [96 %-100 %] 97 %  BP: (104-121)/(60-86) 113/72     Body mass index is 42.97 kg/m².    Physical Exam   Constitutional: She is oriented to person, place, and time. She appears well-developed.   obese   HENT:   Head: Normocephalic and atraumatic.   Eyes: EOM are normal. Pupils are equal, round, and reactive to light.   Neck: Normal range of motion.   Cardiovascular: Normal  rate and regular rhythm.    Pulmonary/Chest: No respiratory distress.   Abdominal: Soft. There is no tenderness.   Musculoskeletal: Normal range of motion. She exhibits no deformity.   Neurological: She is alert and oriented to person, place, and time. A sensory deficit (R foot) is present. She exhibits normal muscle tone.   RUE: 5/5, 5/5 .  LUE: 5/5, 5/5 .  RLE: 5/5, DF 5/5, PF 5/5.  LLE: 5/5, DF 5/5, PF 5/5.  Skin: Skin is warm and dry.   Psychiatric: She has a normal mood and affect. Her behavior is normal. Cognition and memory are not impaired.   Vitals reviewed.        Diagnostic Results:   Labs: Reviewed  ECG: Reviewed  X-Ray: Reviewed  US: Reviewed  CT: Reviewed    Assessment/Plan:     Dysarthria    -resolved in ED  -SLP evaluate and treat        Right leg weakness    -resolved in ED  -PT/OT evaluate and treat        Episode of transient neurologic symptoms    -dysarthria, RLE weakness and numbness resolving in ED  -CTH negative   -unable to have MRI 2/2 pacemaker    Recommendations  -  Encourage mobility, OOB in chair at least 3 hours per day, and early ambulation as appropriate  -  PT/OT evaluate and treat  -  Pain management  -  Monitor for and prevent skin breakdown and pressure ulcers  · Early mobility, repositioning/weight shifting every 20-30 minutes when sitting, turn patient every 2 hours, proper mattress/overlay and chair cushioning, pressure relief/heel protector boots  -  DVT prophylaxis:  Coumadin  -  Reviewed discharge options (IP rehab, SNF, HH therapy, and OP therapy)          ESRD on hemodialysis    -on HD        Therapy evaluations pending.  Will follow and discuss with rehab team for rehab recommendation.      Thank you for your consult.     Syeda Willett NP  Department of Physical Medicine & Rehab  [facility]

## 2017-12-08 NOTE — PLAN OF CARE
[facility] faxed referral for home health to [facility] to arrange for discharge.     Patient will have [facility] for home health needs upon discharge.     Fanny Rajan LMSW  [facility]  Ext. [phone]

## 2017-12-08 NOTE — TELEPHONE ENCOUNTER
----- Message from Radha Joe sent at 12/8/2017 12:04 PM CST -----  Contact: patient [phone]  Pt is being discharged today and needs a hosp/ follow up appt in 1-2 weeks. Preferably. Please call pt at home to give an appt.

## 2017-12-08 NOTE — ASSESSMENT & PLAN NOTE
A1c 6.5 on admission; No h/o DMII but sugars acceptable while in hospital  Will need to follow up with pcp

## 2017-12-08 NOTE — HPI
58yo AAF  who presented from dialysis with episode of unresponsiveness likely 2/2 hypotension, patient was towards the end of her session when symptoms began, her SBP at the time reported to be in the 90s, she was given NS IVFs and this raised SBP in to the low 100s. Patient woke up and was experiencing dysarthria and RLE weakness/numbness. Her symptoms reportedly resolved by the time patient came to the ER. CT was negative for acute abnormalities. She is unable to have and MRI 2/2 pacemaker. Patient's states her symptoms resolved while in Ed. Patient is admitted for a TIA work up.    Nephrology is consulted for ESRD and help coordinate dialysis while in the hospital.

## 2017-12-08 NOTE — CONSULTS
[facility]  Nephrology  Consult Note    Patient Name: Sisi Medel  MRN: [id number]  Admission Date: 12/7/2017  Hospital Length of Stay: 0 days  Attending Provider: Jefferson Corral MD   Primary Care Physician: Carlos A Caputo MD  Principal Problem:<principal problem not specified>    Inpatient consult to Nephrology  Consult performed by: PATEL RODRIGUEZ  Consult ordered by: AMIRA BOWDEN  Reason for consult: esrd        Subjective:     HPI: 56yo AAF  who presented from dialysis with episode of unresponsiveness likely 2/2 hypotension, patient was towards the end of her session when symptoms began, her SBP at the time reported to be in the 90s, she was given NS IVFs and this raised SBP in to the low 100s. Patient woke up and was experiencing dysarthria and RLE weakness/numbness. Her symptoms reportedly resolved by the time patient came to the ER. CT was negative for acute abnormalities. She is unable to have and MRI 2/2 pacemaker. Patient's states her symptoms resolved while in Ed. Patient is admitted for a TIA work up.    Nephrology is consulted for ESRD and help coordinate dialysis while in the hospital.    Past Medical History:   Diagnosis Date    Anemia in ESRD (end-stage renal disease) 3/7/2016    Anticoagulant long-term use     Cervical radiculopathy 7/20/2015    CHF (congestive heart failure)     Chronic combined systolic and diastolic heart failure 6/14/2013    EF 20% 2013, improved with Medical therapy, negative PET 2013    Chronic respiratory failure with hypoxia 04/22/2013    On home oxygen at 2-5 liters    Closed fracture of proximal end of right fibula 6/27/2016    Complications due to renal dialysis device, implant, and graft     DDD (degenerative disc disease), lumbar 6/17/2015    Dependence on renal dialysis     ESRD on hemodialysis 2/23/2016    Essential hypertension     Gout     Lateral meniscal tear 5/31/2016    Lumbar stenosis 6/17/2015     Pacemaker     Paroxysmal atrial fibrillation 2014    Not on anticoagulation    Peripheral neuropathy 2013    Personal history of gastric ulcer 3/19/2013    Sarcoidosis, lung 2009    Diagnosed in  with ocular manifestation, on 4L home O2 and PO steroids    Secondary pulmonary hypertension 2015    Seizure disorder 3/19/2013    Seizures     Shingles 2013    Thyroid disease        Past Surgical History:   Procedure Laterality Date    ABDOMINAL SURGERY      CARDIAC PACEMAKER PLACEMENT       SECTION      x2    OTHER SURGICAL HISTORY      loop recorder implant    stent to fistula      VASCULAR SURGERY      fistula to L upper arm        Review of patient's allergies indicates:   Allergen Reactions    Bactrim [sulfamethoxazole-trimethoprim] Other (See Comments)     Causes renal failure    Nsaids (non-steroidal anti-inflammatory drug) Other (See Comments)     Renal failure     Current Facility-Administered Medications   Medication Frequency    atorvastatin tablet 80 mg Daily    cinacalcet tablet 30 mg Daily with breakfast    clopidogrel tablet 75 mg Daily    digoxin tablet 0.125 mg Every other day    fludrocortisone tablet 100 mcg BID    gabapentin capsule 300 mg TID    ketorolac 0.5% ophthalmic solution 1 drop Daily    labetalol injection 10 mg Q15 Min PRN    levETIRAcetam tablet 500 mg BID    midodrine tablet 10 mg Every Mon, Wed, Fri, Sun    nortriptyline capsule 25 mg QHS    pantoprazole EC tablet 40 mg Daily    polyethylene glycol packet 17 g Daily    prednisoLONE acetate 1 % ophthalmic suspension 1 drop TID    predniSONE tablet 10 mg Daily    sevelamer carbonate tablet 800 mg TID WM    sodium chloride 0.9% bolus 500 mL Continuous PRN    sodium chloride 0.9% flush 3 mL Q8H    vitamin D 1000 units tablet 1,000 Units Daily    warfarin (COUMADIN) tablet 5 mg Daily     Family History     Problem Relation (Age of Onset)    Coronary artery disease Father     Diabetes Mother, Father, Maternal Grandmother    Hypertension Mother, Father    Kidney failure Mother    Lupus Sister        Social History Main Topics    Smoking status: Former Smoker     Packs/day: 0.50     Years: 10.00     Types: Cigarettes     Quit date: 4/22/1994    Smokeless tobacco: Never Used    Alcohol use No      Comment: rarely    Drug use: No    Sexual activity: Not Currently     Review of Systems   Constitutional: Negative for chills, fatigue and fever.   Respiratory: Negative for chest tightness and shortness of breath.    Cardiovascular: Negative for chest pain and leg swelling.   Gastrointestinal: Negative for abdominal distention, abdominal pain, diarrhea and nausea.   Genitourinary: Negative for decreased urine volume, difficulty urinating, flank pain, frequency, hematuria and urgency.   Skin: Negative for rash.   Neurological: Negative for tremors, speech difficulty and weakness.     Objective:     Vital Signs (Most Recent):  Temp: 98.7 °F (37.1 °C) (12/08/17 0323)  Pulse: 70 (12/08/17 0323)  Resp: 18 (12/08/17 0323)  BP: 116/82 (12/08/17 0323)  SpO2: 100 % (12/08/17 0323)  O2 Device (Oxygen Therapy): nasal cannula (12/08/17 0323) Vital Signs (24h Range):  Temp:  [97.5 °F (36.4 °C)-98.7 °F (37.1 °C)] 98.7 °F (37.1 °C)  Pulse:  [69-73] 70  Resp:  [16-19] 18  SpO2:  [96 %-100 %] 100 %  BP: (104-121)/(60-86) 116/82     Weight: 128.2 kg (282 lb 10.1 oz) (12/07/17 1940)  Body mass index is 42.97 kg/m².  Body surface area is 2.48 meters squared.    No intake/output data recorded.    Physical Exam   Constitutional: She is oriented to person, place, and time.   HENT:   Head: Normocephalic and atraumatic.   Eyes: EOM are normal.   Neck: No JVD present.   Cardiovascular: Normal rate and regular rhythm.    Pulmonary/Chest: Effort normal and breath sounds normal.   Abdominal: Soft. She exhibits no distension.   Musculoskeletal: She exhibits no edema or tenderness.   Neurological: She is alert and  oriented to person, place, and time.   Skin: Skin is warm.   Psychiatric: She has a normal mood and affect. Her behavior is normal.         Assessment/Plan:     ESRD on hemodialysis    Etiology: uncertain, but possibly HTN    Modality: iHD  Days: TTS  Access: LUE graft, not much of a thrill, bruit noted, per patient report with increased venous pressures during dialysis, she reports having appointment with vascular surgery, but still appears to be able to complete dialysis sessions  Unit: [facility]  Nephrologist: patient not sure  Tx Duration: 5hrs  EDW: ~121-122  UF : 5-5.5L  Residual fxn: no  Transplant list: deemed to be too high of a risk, details uncertain    HTN:  controlled  Metabolic acidosis: bicarb at 26, no issue  Electrolytes:  stable K at 4.9  Volume Status: no signs of overload, possibly dry weight should be adjusted upward considering drop in BP during most recent session, will take off less fluid than her usual during her session on Saturday  Anemia: Hgb at 14.9, there has been a significant rise since February of this year, patient is not on epo, complex cysts seen on ultrasound in 2016, will check epo level and get renal ultrasound, need to rule out RCC; also will check iron panel  Metabolic Bone disease:phosphate at 6.6, will increase sevelamer to 1,600mg; also on cinacalcet, no recent PTH, will add to lab  Nuitrition:  renal diet, albumin 3.3, which is up from June    Plan for Dialysis: anticipate tomorrow (saturday) as per her usueal TTS schedule, UF goal of 2-3L instead of her usual 5              Thank you for your consult.    Rohith Franco MD  Nephrology  I have reviewed and concur with the fellow's history, physical, assessment, and plan. I have personally interviewed and examined the patient at bedside. [facility]

## 2017-12-08 NOTE — SUBJECTIVE & OBJECTIVE
Past Medical History:   Diagnosis Date    Anemia in ESRD (end-stage renal disease) 3/7/2016    Anticoagulant long-term use     Cervical radiculopathy 2015    CHF (congestive heart failure)     Chronic combined systolic and diastolic heart failure 2013    EF 20% , improved with Medical therapy, negative PET     Chronic respiratory failure with hypoxia 2013    On home oxygen at 2-5 liters    Closed fracture of proximal end of right fibula 2016    Complications due to renal dialysis device, implant, and graft     DDD (degenerative disc disease), lumbar 2015    Dependence on renal dialysis     ESRD on hemodialysis 2016    Essential hypertension     Gout     Lateral meniscal tear 2016    Lumbar stenosis 2015    Pacemaker     Paroxysmal atrial fibrillation 2014    Not on anticoagulation    Peripheral neuropathy 2013    Personal history of gastric ulcer 3/19/2013    Sarcoidosis, lung 2009    Diagnosed in  with ocular manifestation, on 4L home O2 and PO steroids    Secondary pulmonary hypertension 2015    Seizure disorder 3/19/2013    Seizures     Shingles 2013    Thyroid disease        Past Surgical History:   Procedure Laterality Date    ABDOMINAL SURGERY      CARDIAC PACEMAKER PLACEMENT       SECTION      x2    OTHER SURGICAL HISTORY      loop recorder implant    stent to fistula      VASCULAR SURGERY      fistula to L upper arm        Review of patient's allergies indicates:   Allergen Reactions    Bactrim [sulfamethoxazole-trimethoprim] Other (See Comments)     Causes renal failure    Nsaids (non-steroidal anti-inflammatory drug) Other (See Comments)     Renal failure     Current Facility-Administered Medications   Medication Frequency    atorvastatin tablet 80 mg Daily    cinacalcet tablet 30 mg Daily with breakfast    clopidogrel tablet 75 mg Daily    digoxin tablet 0.125 mg Every other day     fludrocortisone tablet 100 mcg BID    gabapentin capsule 300 mg TID    ketorolac 0.5% ophthalmic solution 1 drop Daily    labetalol injection 10 mg Q15 Min PRN    levETIRAcetam tablet 500 mg BID    midodrine tablet 10 mg Every Mon, Wed, Fri, Sun    nortriptyline capsule 25 mg QHS    pantoprazole EC tablet 40 mg Daily    polyethylene glycol packet 17 g Daily    prednisoLONE acetate 1 % ophthalmic suspension 1 drop TID    predniSONE tablet 10 mg Daily    sevelamer carbonate tablet 800 mg TID WM    sodium chloride 0.9% bolus 500 mL Continuous PRN    sodium chloride 0.9% flush 3 mL Q8H    vitamin D 1000 units tablet 1,000 Units Daily    warfarin (COUMADIN) tablet 5 mg Daily     Family History     Problem Relation (Age of Onset)    Coronary artery disease Father    Diabetes Mother, Father, Maternal Grandmother    Hypertension Mother, Father    Kidney failure Mother    Lupus Sister        Social History Main Topics    Smoking status: Former Smoker     Packs/day: 0.50     Years: 10.00     Types: Cigarettes     Quit date: 4/22/1994    Smokeless tobacco: Never Used    Alcohol use No      Comment: rarely    Drug use: No    Sexual activity: Not Currently     Review of Systems   Constitutional: Negative for chills, fatigue and fever.   Respiratory: Negative for chest tightness and shortness of breath.    Cardiovascular: Negative for chest pain and leg swelling.   Gastrointestinal: Negative for abdominal distention, abdominal pain, diarrhea and nausea.   Genitourinary: Negative for decreased urine volume, difficulty urinating, flank pain, frequency, hematuria and urgency.   Skin: Negative for rash.   Neurological: Negative for tremors, speech difficulty and weakness.     Objective:     Vital Signs (Most Recent):  Temp: 98.7 °F (37.1 °C) (12/08/17 0323)  Pulse: 70 (12/08/17 0323)  Resp: 18 (12/08/17 0323)  BP: 116/82 (12/08/17 0323)  SpO2: 100 % (12/08/17 0323)  O2 Device (Oxygen Therapy): nasal cannula  (12/08/17 0323) Vital Signs (24h Range):  Temp:  [97.5 °F (36.4 °C)-98.7 °F (37.1 °C)] 98.7 °F (37.1 °C)  Pulse:  [69-73] 70  Resp:  [16-19] 18  SpO2:  [96 %-100 %] 100 %  BP: (104-121)/(60-86) 116/82     Weight: 128.2 kg (282 lb 10.1 oz) (12/07/17 1940)  Body mass index is 42.97 kg/m².  Body surface area is 2.48 meters squared.    No intake/output data recorded.    Physical Exam   Constitutional: She is oriented to person, place, and time.   HENT:   Head: Normocephalic and atraumatic.   Eyes: EOM are normal.   Neck: No JVD present.   Cardiovascular: Normal rate and regular rhythm.    Pulmonary/Chest: Effort normal and breath sounds normal.   Abdominal: Soft. She exhibits no distension.   Musculoskeletal: She exhibits no edema or tenderness.   Neurological: She is alert and oriented to person, place, and time.   Skin: Skin is warm.   Psychiatric: She has a normal mood and affect. Her behavior is normal.

## 2017-12-08 NOTE — CONSULTS
[facility]  Vascular Neurology  Comprehensive Stroke Center  Consult Note    Inpatient consult to Vascular (Stroke) Neurology  Consult performed by: AMIRA BOWDEN  Consult ordered by: AMIRA BOWDEN        Assessment/Plan:     Patient is a 57 y.o. year old female with:    Episode of transient neurologic symptoms    Sisi Medel is a 57 y.o. female who presented from dialysis with episode of unresponsiveness likely 2/2 hypotension. Patient woke up and was experiencing dysarthria and RLE weakness/numbness. Initial NIH was 5. CT was negative for acute abnormalities. She is unable to have and MRI 2/2 pacemaker. Patient's states her symptoms resolved while in ED. NIH now currently 0. Will admit for TIA workup. Scheduled 24 hour CTA head and neck for 12/8/17 at 12pm.      Antiplatelets: continue home plavix and coumadin  Statins: continue home atorvastatin  SBP <180  VTE ppx: patient on coumadin  PT/OT, SLP, and PM&R  Neuro checks q4h          Dysarthria    Resolved while in ED  SLP to evaluate and treat        Right leg weakness    Resolved while in ED  PT/OT to evaluate        Permanent atrial fibrillation    Stroke risk factor  Continue home coumadin        Current use of long term anticoagulation    Continue home coumadin        Hyperlipidemia    Stroke risk factor  Continue home atorvastatin 80          ESRD on hemodialysis    Last dialysis 12/7/17  Nephrology consulted        Morbid obesity with BMI of 40.0-44.9, adult     on lifestyle modifications prior to discharge        Seizure disorder    Continuing keppra        Pulmonary sarcoidosis    Continue 3L O2 NC  Continuing home prednisone            STROKE DOCUMENTATION     Acute Stroke Times   Last Known Normal Date: 12/07/17  Symptom Onset Date: 12/07/17  Stroke Team Called Date: 12/07/17  Stroke Team Called Time: 1126  Stroke Team Arrival Date: 12/07/17  Stroke Team Arrival Time: 1130  CT Interpretation Time:  1153  Decision to Treat Time for Alteplase: 1153  Decision to Treat Time for IR: 1153    NIH Scale:  1a. Level Of Consciousness: 0-->Alert: keenly responsive  1b. LOC Questions: 0-->Answers both questions correctly  1c. LOC Commands: 0-->Performs both tasks correctly  2. Best Gaze: 0-->Normal  3. Visual: 0-->No visual loss  4. Facial Palsy: 0-->Normal symmetrical movements  5a. Motor Arm, Left: 0-->No drift: limb holds 90 (or 45) degrees for full 10 secs  5b. Motor Arm, Right: 0-->No drift: limb holds 90 (or 45) degrees for full 10 secs  6a. Motor Leg, Left: 0-->No drift: leg holds 30 degree position for full 5 secs  6b. Motor Leg, Right: 3-->No effort against gravity: leg falls to bed immediately  7. Limb Ataxia: 0-->Absent  8. Sensory: 1-->Mild-to-moderate sensory loss: patient feels pinprick is less sharp or is dull on the affected side: or there is a loss of superficial pain with pinprick, but patient is aware of being touched  9. Best Language: 0-->No aphasia: normal  10. Dysarthria: 1-->Mild-to-moderate dysarthria: patient slurs at least some words and, at worst, can be understood with some difficulty  11. Extinction and Inattention (formerly Neglect): 0-->No abnormality  Total (NIH Stroke Scale): 5    Modified Ganesh Score: 3  Pringle Coma Scale:    ABCD2 Score:    [id number] Score:   HAS -BLED Score:   ICH Score:   Hunt & Law Classification:       Thrombolysis Candidate? No, recently received heparin while in dialysis      Interventional Revascularization Candidate?   Is the patient eligible for mechanical endovascular reperfusion (PARDEEP)?  No; No significant neurological deficit      Hemorrhagic change of an Ischemic Stroke: Does this patient have an ischemic stroke with hemorrhagic changes? No     Subjective:     History of Present Illness:  Sisi Medel is a 57 y.o. female with PMHx of HF, pulmonary sarcoidosis, ESRD, HLD, a fib (coumadin), and neuropathy who presented to ED with dysarthria and  RLE weakness/numbness. Patient was at dialysis and was found unresponsive with SBP in 90s. She was given a fluid bolus and began to wake up and respond. Once awake, she was noted to have slurred speech and RLE weakness. Patient was last known normal around 10am.        Past Medical History:   Diagnosis Date    Anemia in ESRD (end-stage renal disease) 3/7/2016    Anticoagulant long-term use     Cervical radiculopathy 2015    CHF (congestive heart failure)     Chronic combined systolic and diastolic heart failure 2013    EF 20% , improved with Medical therapy, negative PET     Chronic respiratory failure with hypoxia 2013    On home oxygen at 2-5 liters    Closed fracture of proximal end of right fibula 2016    Complications due to renal dialysis device, implant, and graft     DDD (degenerative disc disease), lumbar 2015    Dependence on renal dialysis     ESRD on hemodialysis 2016    Essential hypertension     Gout     Lateral meniscal tear 2016    Lumbar stenosis 2015    Pacemaker     Paroxysmal atrial fibrillation 2014    Not on anticoagulation    Peripheral neuropathy 2013    Personal history of gastric ulcer 3/19/2013    Sarcoidosis, lung 2009    Diagnosed in  with ocular manifestation, on 4L home O2 and PO steroids    Secondary pulmonary hypertension 2015    Seizure disorder 3/19/2013    Seizures     Shingles 2013    Thyroid disease      Past Surgical History:   Procedure Laterality Date    ABDOMINAL SURGERY      CARDIAC PACEMAKER PLACEMENT       SECTION      x2    OTHER SURGICAL HISTORY      loop recorder implant    stent to fistula      VASCULAR SURGERY      fistula to L upper arm      Family History   Problem Relation Age of Onset    Kidney failure Mother     Hypertension Mother     Diabetes Mother     Coronary artery disease Father     Hypertension Father     Diabetes Father     Lupus  Sister     Diabetes Maternal Grandmother     Colon cancer Neg Hx     Ovarian cancer Neg Hx     Breast cancer Neg Hx      Social History   Substance Use Topics    Smoking status: Former Smoker     Packs/day: 0.50     Years: 10.00     Types: Cigarettes     Quit date: 4/22/1994    Smokeless tobacco: Never Used    Alcohol use No      Comment: rarely     Review of patient's allergies indicates:   Allergen Reactions    Bactrim [sulfamethoxazole-trimethoprim] Other (See Comments)     Causes renal failure    Nsaids (non-steroidal anti-inflammatory drug) Other (See Comments)     Renal failure       Medications: I have reviewed the current medication administration record.      (Not in a hospital admission)    Review of Systems   Constitutional: Negative for chills and fever.   Eyes: Positive for redness. Negative for visual disturbance.   Respiratory: Positive for shortness of breath (on oxygen at baseline). Negative for cough.    Cardiovascular: Negative for chest pain and palpitations.   Gastrointestinal: Negative for nausea and vomiting.   Skin: Negative for rash.   Neurological: Positive for speech difficulty and weakness.   Psychiatric/Behavioral: Negative for agitation.     Objective:     Vital Signs (Most Recent):  Temp: 98.4 °F (36.9 °C) (12/07/17 1122)  Pulse: 73 (12/07/17 1122)  Resp: 18 (12/07/17 1122)  BP: 107/68 (12/07/17 1122)  SpO2: 96 % (12/07/17 1122)    Vital Signs Range (Last 24H):  Temp:  [98.4 °F (36.9 °C)]   Pulse:  [73]   Resp:  [18]   BP: (107)/(68)   SpO2:  [96 %]     Physical Exam   Constitutional: She is oriented to person, place, and time. She appears well-developed and well-nourished. No distress.   HENT:   Head: Normocephalic and atraumatic.   Eyes: EOM are normal. Pupils are equal, round, and reactive to light.   Cardiovascular: Normal rate.    Pulmonary/Chest: Effort normal.   Neurological: She is alert and oriented to person, place, and time.   Skin: Skin is warm and dry.   Vitals  reviewed.      Neurological Exam:   LOC: alert  Attention Span: Good   Language: No aphasia  Articulation: Dysarthria  Orientation: Person, Place, Time   Visual Fields: Full  EOM (CN III, IV, VI): Full/intact  Pupils (CN II, III): PERRL  Facial Sensation (CN V): Normal  Facial Movement (CN VII): Symmetric facial expression    Motor: Arm left Normal (5/5)  Leg left Normal (5/5)  Arm right Normal (5/5)  Leg right 3/5  Cebellar: No evidence of appendicular or axial ataxia  Sensation: decreased sensation in RLE  Tone: Normal tone throughout      Laboratory:  CMP: No results for input(s): GLUCOSE, CALCIUM, ALBUMIN, PROT, NA, K, CO2, CL, BUN, CREATININE, ALKPHOS, ALT, AST, BILITOT in the last 24 hours.  CBC:   Recent Labs  Lab 12/07/17  1232   WBC 11.09   RBC 5.50*   HGB 16.2*   HCT 49.7*      MCV 90   MCH 29.5   MCHC 32.6     Lipid Panel: No results for input(s): CHOL, LDLCALC, HDL, TRIG in the last 168 hours.  Coagulation: No results for input(s): PT, INR, APTT in the last 168 hours.  Hgb A1C: No results for input(s): HGBA1C in the last 168 hours.  TSH: No results for input(s): TSH in the last 168 hours.    Diagnostic Results:      Brain imaging:    CT Head. Date: 12/07/17  No evidence of acute intracranial pathology.     Vessel Imaging:  pending    Cardiac Evaluation:   pending            Tarsha Joe PA-C  [facility]  Department of Vascular Neurology   [facility]

## 2017-12-08 NOTE — HOSPITAL COURSE
Patient presented from dialysis on 12/7/17 after experiencing dysarthria and RLE weakness/numbness after an episode of unresponsiveness likely 2/2 hypotension. Her initial NIH was 5, but patient states her symptoms had resolved while in the ED where her NIH was found to be 0. CT was negative for acute abnormalities. She was unable to have and MRI 2/2 pacemaker. Patient's A1c was 6.5 suggesting newly diagnosed diabetes and her LDL was 100.  CTA shows no acute or vascular abnormalities .  It did show a multinodular thyroid for which patient can have an US as outpatient.  PT/OT evaluated patient and think she would benefit from HH. Patient's symptoms have completely resolved, and she is stable for discharge home with home health PT/OT/ intermittent skilled nursing.

## 2017-12-08 NOTE — HPI
Sisi Medel is a 57-year-old female with PMHx of ESRD (on dialysis), CHF, pulmonary sarcoidosis, HLD, A-fib (on Coumadin), and neuropathy.  Patient presented to [facility] on 12/7 after being found unresponsive at dialysis with SBP in 90s.  Upon arrival to ED, she has dysarthria and RLE weakness and numbness. She was given a fluid bolus and began to respond.  CTH revealed no acute pathology. She is unable to have a MRI 2/2 CTA head/neck and Retroperitoneal US pending.      Functional History: Per patient, lives in [place] with fiance' in a single story home with 4-5 steps to enter.  Prior to admission, (I) with ADLs and utilized a rollator and SC for ambulation.  DME: BSC, SC, and rollator.

## 2017-12-08 NOTE — CONSULTS
Food & Nutrition  Education    Diet Education: Stroke pathway    Comments: Pt BRAYAN x2 attempts. Paperwork left at bedside. RD to follow-up with cardiac diet education.     Dietitian's contact information provided.     Follow-Up: 12/15    Please re-consult RD as needed.    Thanks!

## 2017-12-11 ENCOUNTER — HOSPITAL ENCOUNTER (OUTPATIENT)
Dept: VASCULAR SURGERY | Facility: CLINIC | Age: 57
Discharge: HOME | End: 2017-12-11
Payer: MEDICARE

## 2017-12-11 ENCOUNTER — TELEPHONE (OUTPATIENT)
Dept: NEUROSURGERY | Facility: HOSPITAL | Age: 57
End: 2017-12-11

## 2017-12-11 ENCOUNTER — OFFICE VISIT (OUTPATIENT)
Dept: VASCULAR SURGERY | Facility: CLINIC | Age: 57
End: 2017-12-11
Payer: MEDICARE

## 2017-12-11 VITALS
WEIGHT: 288 LBS | HEIGHT: 68 IN | TEMPERATURE: 98 F | SYSTOLIC BLOOD PRESSURE: 127 MMHG | BODY MASS INDEX: 43.65 KG/M2 | HEART RATE: 70 BPM | DIASTOLIC BLOOD PRESSURE: 74 MMHG

## 2017-12-11 DIAGNOSIS — N18.6 ESRD (END STAGE RENAL DISEASE) ON DIALYSIS: ICD-10-CM

## 2017-12-11 DIAGNOSIS — N18.6 ESRD (END STAGE RENAL DISEASE) ON DIALYSIS: Primary | ICD-10-CM

## 2017-12-11 DIAGNOSIS — Z99.2 ESRD (END STAGE RENAL DISEASE) ON DIALYSIS: ICD-10-CM

## 2017-12-11 DIAGNOSIS — T82.9XXD COMPLICATION OF ARTERIOVENOUS DIALYSIS FISTULA, SUBSEQUENT ENCOUNTER: ICD-10-CM

## 2017-12-11 DIAGNOSIS — Z99.2 ESRD (END STAGE RENAL DISEASE) ON DIALYSIS: Primary | ICD-10-CM

## 2017-12-11 LAB — ERYTHROPOIETIN: 17.2 MIU/ML

## 2017-12-11 PROCEDURE — 99499 UNLISTED E&M SERVICE: CPT | Mod: S$GLB,,, | Performed by: SURGERY

## 2017-12-11 PROCEDURE — 99999 PR PBB SHADOW E&M-EST. PATIENT-LVL V: CPT | Mod: PBBFAC,,, | Performed by: SURGERY

## 2017-12-11 PROCEDURE — 99214 OFFICE O/P EST MOD 30 MIN: CPT | Mod: S$GLB,,, | Performed by: SURGERY

## 2017-12-11 PROCEDURE — 93990 DOPPLER FLOW TESTING: CPT | Mod: S$GLB,,, | Performed by: SURGERY

## 2017-12-11 RX ORDER — MUPIROCIN 20 MG/G
OINTMENT TOPICAL
Status: CANCELLED | OUTPATIENT
Start: 2017-12-11

## 2017-12-11 RX ORDER — LIDOCAINE HYDROCHLORIDE 10 MG/ML
1 INJECTION, SOLUTION EPIDURAL; INFILTRATION; INTRACAUDAL; PERINEURAL ONCE
Status: CANCELLED | OUTPATIENT
Start: 2017-12-11 | End: 2017-12-11

## 2017-12-11 NOTE — PROGRESS NOTES
Sisi Medel  12/11/2017    HPI:  Patient is a 55 y.o.  female with a h/o HTN, HLD, CHF (EF 20%), COPD on Home O2, Paroxysmal Afib (On Coumadin anticoagulation), Seizure disorder, ESRD on HD via LUE AVG who is here for evaluation of her LUE upper arm AV graft (placed by 2/3/16). She had recent PTA of the graft (brachial vein) x2: (7x60 [place]); (8x40 [place]) on 10/12/17. The AV graft was working well until a few weeks ago when she reports that her arterial access site began to have increased pressure. She does dialysis T/Th/Sat. Otherwise patient is doing well with no complaints.    S/p   2/3/16: LUE AVG creation (Dr Villafana)  6/21/16: Percutaneous mechanical thrombectomy w [place] Angiojet AVX; 4fr OTW embolectomy of arterial inflow   PTA outflow stenosis with a 7x40 mm balloon  10/12/16: fistulogram (75% stenosis noted), left upper extremity AV graft PTA venous outflow with resolution of stenosis noted  2/2017 Declot and venous outflow PTA and stent with 8 x 50 VIABAHN  5/15/2017: PTA outflow stenosis (8x60 [place]); Stent outflow stenosis (8x50 [place])  10/12/17: PTA LUE AV graft (brachial vein) x2: (7x60 [place]); (8x40 [place])     Findings/Key Components:   Good palpable thrill      MI/stroke  Tobacco use: Former smoker     Past Medical History   Diagnosis Date    Anemia in ESRD (end-stage renal disease) 3/7/2016    Cervical radiculopathy 7/20/2015    Chronic combined systolic and diastolic heart failure 6/14/2013     EF 20% 2013, improved with Medical therapy, negative PET 2013    Chronic respiratory failure with hypoxia 04/22/2013     On home oxygen at 2-5 liters    Chronic rhinitis 10/2/2013    DDD (degenerative disc disease), lumbar 6/17/2015    ESRD on hemodialysis 2/23/2016    Essential hypertension     Gout     Hyperlipidemia 3/7/2016    Lateral meniscal tear 5/31/2016    Lumbar stenosis 6/17/2015    Morbid obesity 8/15/2013    Paroxysmal atrial fibrillation  2014     Not on anticoagulation    Peripheral neuropathy 2013    Personal history of fall 2014    Personal history of gastric ulcer 3/19/2013    Sarcoidosis, lung 2009     Diagnosed in  with ocular manifestation, on 4L home O2 and PO steroids    Secondary pulmonary hypertension 2015    Seizure disorder 3/19/2013    Shingles 2013    Spondylarthrosis 2015     Past Surgical History   Procedure Laterality Date     section, classic      Abdominal surgery      Vascular surgery       Family History   Problem Relation Age of Onset    Kidney failure Mother     Hypertension Mother     Diabetes Mother     Coronary artery disease Father     Hypertension Father     Diabetes Father     Lupus Sister     Diabetes Maternal Grandmother     Asthma Neg Hx     Emphysema Neg Hx     Melanoma Neg Hx     Psoriasis Neg Hx      Social History     Social History    Marital status: Single     Spouse name: N/A    Number of children: N/A    Years of education: N/A     Occupational History    Not on file.     Social History Main Topics    Smoking status: Former Smoker     Packs/day: 0.50     Years: 10.00     Types: Cigarettes     Quit date: 1994    Smokeless tobacco: Never Used    Alcohol use No    Drug use: No    Sexual activity: No     Other Topics Concern    Not on file     Social History Narrative    Lives with boyfriend/partner who helps with her care.     Plans to travel to [place] for 2 weeks in 2016     Current Outpatient Prescriptions on File Prior to Visit   Medication Sig    ACZONE 5 % topical gel Apply topically once daily.     allopurinol (ZYLOPRIM) 100 MG tablet Take 1 tablet (100 mg total) by mouth once daily.    amiodarone (PACERONE) 200 MG Tab Take 1 tablet (200 mg total) by mouth every morning.    ammonium lactate (LAC-HYDRIN) 12 % lotion Apply topically as needed (for scars on arms).     aspirin 81 MG Chew Take 81 mg by mouth every morning.     atorvastatin (LIPITOR) 80 MG tablet Take 80 mg by mouth once daily.     capsicum 0.075% (ZOSTRIX) 0.075 % topical cream Apply topically 3 (three) times daily. For neuropathic pain of feet    gabapentin (NEURONTIN) 100 MG capsule TAKE ONE CAPSULE BY MOUTH THREE TIMES DAILY (Patient taking differently: TAKE ONE CAPSULE BY MOUTH THREE TIMES DAILY-noon, evening, bedtime)    levetiracetam (KEPPRA) 500 MG Tab TAKE 1 TABLET BY MOUTH TWICE DAILY.    melatonin 5 mg Tab Take 1 tablet by mouth nightly.    midodrine (PROAMATINE) 10 MG tablet     midodrine (PROAMATINE) 5 MG Tab     NEPHRO-LINDA 0.8 mg Tab TK 1 T PO  QD    ondansetron (ZOFRAN-ODT) 8 MG TbDL Take 1 tablet (8 mg total) by mouth every 8 (eight) hours as needed.    oxycodone-acetaminophen (PERCOCET) 7.5-325 mg per tablet Take 1 tablet by mouth every 4 (four) hours as needed.    predniSONE (DELTASONE) 10 MG tablet TAKE 3 TABLETS BY MOUTH FOR 7 DAYS, THEN 2 TABLETS FOR 7 DAYS, THEN 1 TABLET EVERY DAY AFTER.    RENVELA 800 mg Tab TAKE 1 TABLET BY MOUTH THREE TIMES DAILY WITH MEALS    tizanidine 4 mg Cap Take 4 mg by mouth 2 (two) times daily as needed. (Patient taking differently: Take 4 mg by mouth daily as needed (for muscle spasms.). )    warfarin (COUMADIN) 5 MG tablet TAKE 1 TABLET(5 MG) BY MOUTH DAILY     No current facility-administered medications on file prior to visit.        REVIEW OF SYSTEMS:  General: negative; ENT: negative; Allergy and Immunology: negative; Hematological and Lymphatic: Negative; Endocrine: negative; Respiratory: no cough, shortness of breath, or wheezing; Cardiovascular: no chest pain or dyspnea on exertion; Gastrointestinal: no abdominal pain/back, change in bowel habits, or bloody stools; Genito-Urinary: no dysuria, trouble voiding, or hematuria; Musculoskeletal: no pain, numbness, to LUE  Neurological: no TIA or stroke symptoms; Psychiatric: no nervousness, anxiety or depression.    PHYSICAL EXAM:   Vitals:    07/14/16 1532    BP: (!) 81/57   Pulse: (!) 52   Temp: 98.9 °F (37.2 °C)       General appearance:  Alert, well-appearing, and in no distress.  Oriented to person, place, and time   Neurological:  Normal speech, no focal findings noted; CN II - XII grossly intact           Musculoskeletal: Digits/nail without cyanosis/clubbing.  Normal muscle strength/tone.                 Neck: Supple, no significant adenopathy; thyroid is not enlarged                Chest:  Clear to auscultation, symmetric air entry      No use of accessory muscles             Cardiac: Normal rate and regular rhythm, S1 and S2 normal; PMI non-displaced          Abdomen: Soft, nontender, nondistended, no masses or organomegaly      no rebound tenderness noted      Extremities:   LUE AVG with palpable thrill, no increased pulsatility, 2+ distal radial pulse      LAB RESULTS:  Lab Results   Component Value Date    K 4.5 07/12/2016    K 4.6 07/04/2016    K 5.1 07/03/2016    CREATININE 6.5 (H) 07/12/2016    CREATININE 10.2 (H) 07/04/2016    CREATININE 7.6 (H) 07/03/2016     Lab Results   Component Value Date    WBC 11.09 07/12/2016    WBC 10.38 07/04/2016    WBC 11.01 07/03/2016    HCT 37.5 07/12/2016    HCT 31.4 (L) 07/04/2016    HCT 30.1 (L) 07/03/2016     07/12/2016     07/04/2016     07/03/2016     Lab Results   Component Value Date    HGBA1C 5.7 05/17/2016    HGBA1C 6.7 (H) 10/15/2015    HGBA1C 5.5 06/15/2015     IMAGING:  Inflow and arterial anastomosis velocities maintained.  Venous outflow velocities elevated.   Volume flow maintained     IMP/PLAN:  55 y.o. female with HTN, HLD, CHF (EF 20%), COPD on Home O2, Paroxysmal Afib (On Coumadin anticoagulation), Seizure disorder, ESRD on HD via LUE AVG who is here today for evaluation of her LUE upper arm AV graft (placed by Dr. Villafana 2/3/16). Increased arterial pressure during dialysis.  Duplex today reveals elevated venous outflow velocities from previous study.  Given her history of AV  graft thrombosis and multiple interventions, will plan for a fistulogram with possible intervention and reflux angiogram to examine arterial anastomosis.     1) LUE fistulagram 12/15/17   2) continue coumadin and all medications  3) Stitch removed from previous intervention     Torrey Villafana MD  Vascular & Endovascular Surgery

## 2017-12-12 NOTE — TELEPHONE ENCOUNTER
Please follow up on getting patient scheduled for hospital follow up visit. She is high risk for readmission with recent TIA, would benefit from priority clinic follow up if possible.

## 2017-12-12 NOTE — TELEPHONE ENCOUNTER
Please advise if pt can be seen in [facility].   Pt agreed to be scheduled. Please contact pt for scheduling if appropriate.

## 2017-12-13 ENCOUNTER — ANTI-COAG VISIT (OUTPATIENT)
Dept: CARDIOLOGY | Facility: CLINIC | Age: 57
End: 2017-12-13
Payer: MEDICARE

## 2017-12-13 ENCOUNTER — OFFICE VISIT (OUTPATIENT)
Dept: NEUROLOGY | Facility: CLINIC | Age: 57
End: 2017-12-13
Payer: MEDICARE

## 2017-12-13 VITALS
WEIGHT: 285.5 LBS | DIASTOLIC BLOOD PRESSURE: 81 MMHG | SYSTOLIC BLOOD PRESSURE: 121 MMHG | HEIGHT: 68 IN | HEART RATE: 69 BPM | BODY MASS INDEX: 43.27 KG/M2

## 2017-12-13 DIAGNOSIS — Z79.01 LONG-TERM (CURRENT) USE OF ANTICOAGULANTS: Primary | ICD-10-CM

## 2017-12-13 DIAGNOSIS — G40.909 SEIZURE DISORDER: Primary | Chronic | ICD-10-CM

## 2017-12-13 DIAGNOSIS — G63 POLYNEUROPATHY ASSOCIATED WITH UNDERLYING DISEASE: ICD-10-CM

## 2017-12-13 DIAGNOSIS — Z79.01 CURRENT USE OF LONG TERM ANTICOAGULATION: ICD-10-CM

## 2017-12-13 LAB — INR PPP: 1.9 (ref 2–3)

## 2017-12-13 PROCEDURE — 99214 OFFICE O/P EST MOD 30 MIN: CPT | Mod: S$GLB,,, | Performed by: PSYCHIATRY & NEUROLOGY

## 2017-12-13 PROCEDURE — 99999 PR PBB SHADOW E&M-EST. PATIENT-LVL III: CPT | Mod: PBBFAC,,, | Performed by: PSYCHIATRY & NEUROLOGY

## 2017-12-13 PROCEDURE — 85610 PROTHROMBIN TIME: CPT | Mod: QW,S$GLB,,

## 2017-12-13 PROCEDURE — 99211 OFF/OP EST MAY X REQ PHY/QHP: CPT | Mod: 25,S$GLB,,

## 2017-12-13 PROCEDURE — 99499 UNLISTED E&M SERVICE: CPT | Mod: S$GLB,,, | Performed by: PSYCHIATRY & NEUROLOGY

## 2017-12-13 NOTE — TELEPHONE ENCOUNTER
Contacted pt to schedule PC appt. Pt discharged on 12/8 and pt needs to be seen in PC by 12/22. Pt states that she will be going out of town on 12/16 and won't return until 12/27. This places the patient outside of the 14 day window from discharge date. Pt says she would like to f/u with Dr. Caputo when she returns.

## 2017-12-13 NOTE — PROGRESS NOTES
[facility] - NEUROLOGY  [facility] [place]    Date: December 13, 2017   Patient Name: Sisi Medel   MRN: [id number]   PCP: Carlos A Caputo  Referring Provider: No ref. provider found    Assessment:      This is Sisi Medel, 57 y.o. female with complex medical problems including AF on coumadin, pulmonary sarcoid, ESRD on HD with orthostasis and recurrent syncope around dialysis who presents for follow up for her epilepsy.  She has symptoms of painful polyneuropathy noted on EMG 2014, no hx of DM but most likely glucose intolerance from long term steroid use verus sarcoid versus renal disease, strength is intact.     Plan:      -  Compound cream, encouraged use of lotions to help with dry skin  -  Continue LEV     2014 EPILEPSY QUALITY MEASUREMENT (AAN)  1 a. Seizure Frequency: as noted  1b. Seizure Intervention: as noted  2.  a. Etiology: as noted  b. Seizure Type: as noted  c. Epilepsy Syndrome: n/a  3.  a. Side-effects of AED: as noted   b. Intervention for side-effects: as noted  4. Screening for psychiatric or behavioral disorders: as noted  5. Personalized epilepsy safety issues and education: yes  6. Counseling for women of child-bearing potential and epilepsy: yes   7. Referral of treatment-resistant epilepsy to comprehensive epilepsy center (q 2 years): N/A.    The patient was asked to call me/the clinic 1 week after the test(s) are done to obtain results.    The following issues were  all discussed in detail with the patient and family/caregiver(s):    1. Diagnosis, plans, prognosis, medications and possible side-effects, risks and benefits of treatment, other alternatives to AEDs.  2. Risks related to continued seizures, status epilepticus, SUDEP, driving restrictions and seizure precautions ( no baths but showers are ok, no swimming unsupervised, no use of heavy machinery, no use of sharp moving objects, avoid heights).   3. Issues related to pregnancy,  OCP and breast feeding as it relates to epilepsy.  4. The potential of teratogenicity and suicidal risks of anti-epileptic medications.  5.Avoid any activity that compromise patient safety related to seizures.     Questions and concerns raised by the patient and family/care-giver(s) were addressed and they indicated understanding of everything discussed and agreed to plans as above.       I discussed side effects of the medications. I asked the patient to stop the medication if she notices serious adverse effects as we discussed and to seek immediate medical attention at an ER.     Emeka Lester MD  [facility]   Department of Neurology    Subjective:   Improvement with compound cream, feels she is more mobile but notes it dries skin; did not start pamelor; planning to visit son in [place] for [place]    HPI 9/2017:   Ms. Sisi Medel is a 57 y.o. female who presents for follow up for epilepsy  Her seizures remain controlled and her main concern today is painful paresthesias in distal low extremities which started on right where she had shingles.  No relief from GBP, lidocaine/capsaicin cream.    PAST MEDICAL HISTORY:  Past Medical History:   Diagnosis Date    Anemia in ESRD (end-stage renal disease) 3/7/2016    Anticoagulant long-term use     Cervical radiculopathy 7/20/2015    CHF (congestive heart failure)     Chronic combined systolic and diastolic heart failure 6/14/2013    EF 20% 2013, improved with Medical therapy, negative PET 2013    Chronic respiratory failure with hypoxia 04/22/2013    On home oxygen at 2-5 liters    Closed fracture of proximal end of right fibula 6/27/2016    Complications due to renal dialysis device, implant, and graft     DDD (degenerative disc disease), lumbar 6/17/2015    Dependence on renal dialysis     Diabetes mellitus type II, controlled 12/8/2017    ESRD on hemodialysis 2/23/2016    Essential hypertension     Gout     Lateral meniscal tear  2016    Lumbar stenosis 2015    Pacemaker     Paroxysmal atrial fibrillation 2014    Not on anticoagulation    Peripheral neuropathy 2013    Personal history of gastric ulcer 3/19/2013    Sarcoidosis, lung 2009    Diagnosed in  with ocular manifestation, on 4L home O2 and PO steroids    Secondary pulmonary hypertension 2015    Seizure disorder 3/19/2013    Seizures     Shingles 2013    Thyroid disease        PAST SURGICAL HISTORY:  Past Surgical History:   Procedure Laterality Date    ABDOMINAL SURGERY      CARDIAC PACEMAKER PLACEMENT       SECTION      x2    OTHER SURGICAL HISTORY      loop recorder implant    stent to fistula      VASCULAR SURGERY      fistula to L upper arm        CURRENT MEDS:  Current Outpatient Prescriptions   Medication Sig Dispense Refill    ACZONE 5 % topical gel Apply to face every morning  2    ammonium lactate (LAC-HYDRIN) 12 % lotion Apply topically as needed (to legs).   2    atorvastatin (LIPITOR) 80 MG tablet TAKE 1 TABLET(80 MG) BY MOUTH EVERY NIGHT 90 tablet 3    cholecalciferol, vitamin D3, 1,000 unit capsule Take 1 capsule (1,000 Units total) by mouth once daily. (Patient taking differently: Take 1,000 Units by mouth every evening. )  0    clopidogrel (PLAVIX) 75 mg tablet 75 mg.      diclofenac sodium (SOLARAZE) 3 % gel       digoxin (LANOXIN) 125 mcg tablet Take 1 tablet (0.125 mg total) by mouth every other day. 15 tablet 1    doxepin (ZONALON) 5 % cream       econazole nitrate 1 % cream APPLY TOPICALLY TWICE DAILY AS NEEDED FOR FUNGAL SKIN INFECTIONS. USE FOR 2 TO 4 WEEKS 30 g 0    fludrocortisone (FLORINEF) 0.1 mg Tab Take 100 mcg by mouth 2 (two) times daily.      gabapentin (NEURONTIN) 300 MG capsule TK ONE C PO  TID  2    ketorolac 0.5% (ACULAR) 0.5 % Drop instill one drop into both eyes every morning  3    levetiracetam (KEPPRA) 500 MG Tab Take 1 tablet (500 mg total) by mouth 2 (two) times daily.  60 tablet 5    midodrine (PROAMATINE) 5 MG Tab Take 2 tablets (10 mg total) by mouth every Mon, Wed, Fri, Sun. 32 tablet 11    nortriptyline (PAMELOR) 25 MG capsule Take 1 capsule (25 mg total) by mouth every evening. 30 capsule 11    omeprazole (PRILOSEC) 20 MG capsule TAKE 1 CAPSULE(20 MG) BY MOUTH EVERY DAY 90 capsule 3    polyethylene glycol (GLYCOLAX) 17 gram/dose powder Take 17 g by mouth once daily. Dissolve in juice. 510 g 0    prednisoLONE acetate (PRED FORTE) 1 % DrpS INSTILL ONE DROP INTO  LEFT EYE THREE TIMES A DAY  3    predniSONE (DELTASONE) 10 MG tablet Take 1 tablet (10 mg total) by mouth once daily. 90 tablet 3    predniSONE (DELTASONE) 10 MG tablet TAKE 1 TABLET BY MOUTH EVERY DAY WITH BREAKFAST 90 tablet 0    RENVELA 800 mg Tab TAKE 1 TABLET BY MOUTH THREE TIMES DAILY WITH MEALS 90 tablet 0    SENSIPAR 30 mg Tab       tizanidine 4 mg Cap TAKE 1 CAPSULE BY MOUTH DAILY AS NEEDED FOR MUSCLE SPASM 90 capsule 0    warfarin (COUMADIN) 5 MG tablet Take 1 tablet (5 mg total) by mouth Daily. Per [facility] (Patient taking differently: Take 5 mg by mouth every evening. Per [facility]) 90 tablet 3     No current facility-administered medications for this visit.        ALLERGIES:  Review of patient's allergies indicates:   Allergen Reactions    Bactrim [sulfamethoxazole-trimethoprim] Other (See Comments)     Causes renal failure    Nsaids (non-steroidal anti-inflammatory drug) Other (See Comments)     Renal failure       FAMILY HISTORY:  Family History   Problem Relation Age of Onset    Kidney failure Mother     Hypertension Mother     Diabetes Mother     Coronary artery disease Father     Hypertension Father     Diabetes Father     Lupus Sister     Diabetes Maternal Grandmother     Colon cancer Neg Hx     Ovarian cancer Neg Hx     Breast cancer Neg Hx        SOCIAL HISTORY:  Social History   Substance Use Topics    Smoking status: Former Smoker     Packs/day: 0.50      "Years: 10.00     Types: Cigarettes     Quit date: 4/22/1994    Smokeless tobacco: Never Used    Alcohol use No      Comment: rarely       Review of Systems:  12 review of systems is negative except for the symptoms mentioned in HPI.        Objective:     Vitals:    12/13/17 0821   BP: 121/81   Pulse: 69   Weight: 129.5 kg (285 lb 7.9 oz)   Height: 5' 8" (1.727 m)       General: NAD, well nourished   Eyes: no tearing, discharge, no erythema   ENT: moist mucous membranes of the oral cavity, nares patent    Neck: Supple, full range of motion  Cardiovascular: Warm and well perfused, pulses equal and symmetrical  Lungs: Normal work of breathing, normal chest wall excursions  Skin: No rash, lesions, or breakdown on exposed skin  Psychiatry: Mood and affect are appropriate   Abdomen: soft, non tender, non distended  Extremeties: No cyanosis, clubbing or edema.    Neurological   MENTAL STATUS: Alert and oriented to person, place, and time. Attention and concentration within normal limits. Speech without dysarthria, able to name and repeat without difficulty. Recent and remote memory within normal limits   CRANIAL NERVES: Visual fields intact. PERRL. EOMI. Facial sensation intact. Face symmetrical. Hearing grossly intact. Full shoulder shrug bilaterally. Tongue protrudes midline   SENSORY: Sensation is intact to light touch throughout.  Negative Romberg.   MOTOR: Normal bulk and tone. No pronator drift.  5/5 deltoid, biceps, triceps, interosseous, hand  bilaterally. 5/5 iliopsoas, knee extension/flexion, foot dorsi/plantarflexion bilaterally.    CEREBELLAR/COORDINATION/GAIT: Gait steady with normal arm swing and stride length.  Heel to shin intact. Finger to nose intact. Normal rapid alternating movements.   "

## 2017-12-13 NOTE — PROGRESS NOTES
INR low today. Patient states that she has bruising from use. She was admitted to ED on 12/7 and missed her dose of coumadin that night. She denies any other changes. We will slightly boost her dose today and resume her weekly dose. Follow up in 2 weeks. Advised patient to notify us of any changes or concerns.

## 2017-12-19 DIAGNOSIS — G40.909 SEIZURE DISORDER: Chronic | ICD-10-CM

## 2017-12-19 RX ORDER — LEVETIRACETAM 500 MG/1
500 TABLET ORAL 2 TIMES DAILY
Qty: 180 TABLET | Refills: 3 | Status: ON HOLD | OUTPATIENT
Start: 2017-12-19 | End: 2019-01-01 | Stop reason: SDUPTHER

## 2017-12-26 ENCOUNTER — TELEPHONE (OUTPATIENT)
Dept: NEUROLOGY | Facility: CLINIC | Age: 57
End: 2017-12-26

## 2017-12-26 NOTE — TELEPHONE ENCOUNTER
----- Message from Mercedez Snyder RN sent at 12/8/2017 12:06 PM CST -----  Contact: Mercedez White  Please schedule hospital follow up in 4-6 weeks. Please call the patient with time and date of appt.

## 2017-12-26 NOTE — TELEPHONE ENCOUNTER
Followed by Dr. Lester for Epilepsy management(last seen 12/13/17). Recent admission for syncopal episode r/t hypotension during HD. Imaging negative, no vascular neuro recs for f/u.

## 2017-12-28 DIAGNOSIS — N18.6 ESRD (END STAGE RENAL DISEASE) ON DIALYSIS: Primary | ICD-10-CM

## 2017-12-28 DIAGNOSIS — Z99.2 ESRD (END STAGE RENAL DISEASE) ON DIALYSIS: Primary | ICD-10-CM

## 2018-01-01 ENCOUNTER — LAB VISIT (OUTPATIENT)
Dept: LAB | Facility: OTHER | Age: 58
End: 2018-01-01
Payer: MEDICARE

## 2018-01-01 ENCOUNTER — DOCUMENTATION ONLY (OUTPATIENT)
Dept: INTERNAL MEDICINE | Facility: CLINIC | Age: 58
End: 2018-01-01

## 2018-01-01 ENCOUNTER — ANTI-COAG VISIT (OUTPATIENT)
Dept: CARDIOLOGY | Facility: CLINIC | Age: 58
End: 2018-01-01
Payer: MEDICARE

## 2018-01-01 ENCOUNTER — ANTI-COAG VISIT (OUTPATIENT)
Dept: CARDIOLOGY | Facility: CLINIC | Age: 58
End: 2018-01-01

## 2018-01-01 ENCOUNTER — OFFICE VISIT (OUTPATIENT)
Dept: NEUROLOGY | Facility: CLINIC | Age: 58
End: 2018-01-01
Payer: MEDICARE

## 2018-01-01 ENCOUNTER — PROCEDURE VISIT (OUTPATIENT)
Dept: NEUROLOGY | Facility: CLINIC | Age: 58
End: 2018-01-01
Payer: MEDICARE

## 2018-01-01 ENCOUNTER — HOSPITAL ENCOUNTER (OUTPATIENT)
Dept: VASCULAR SURGERY | Facility: CLINIC | Age: 58
Discharge: HOME | End: 2018-10-17
Attending: SURGERY
Payer: MEDICARE

## 2018-01-01 ENCOUNTER — TELEPHONE (OUTPATIENT)
Dept: OBSTETRICS AND GYNECOLOGY | Facility: CLINIC | Age: 58
End: 2018-01-01

## 2018-01-01 ENCOUNTER — OFFICE VISIT (OUTPATIENT)
Dept: PULMONOLOGY | Facility: CLINIC | Age: 58
End: 2018-01-01
Payer: MEDICARE

## 2018-01-01 ENCOUNTER — TELEPHONE (OUTPATIENT)
Dept: PULMONOLOGY | Facility: CLINIC | Age: 58
End: 2018-01-01

## 2018-01-01 ENCOUNTER — CLINICAL SUPPORT (OUTPATIENT)
Dept: CARDIOLOGY | Facility: HOSPITAL | Age: 58
End: 2018-01-01
Attending: INTERNAL MEDICINE
Payer: MEDICARE

## 2018-01-01 ENCOUNTER — HOSPITAL ENCOUNTER (OUTPATIENT)
Dept: VASCULAR SURGERY | Facility: CLINIC | Age: 58
Discharge: HOME | End: 2018-08-29
Attending: SURGERY
Payer: MEDICARE

## 2018-01-01 ENCOUNTER — LAB VISIT (OUTPATIENT)
Dept: LAB | Facility: HOSPITAL | Age: 58
End: 2018-01-01
Attending: INTERNAL MEDICINE
Payer: MEDICARE

## 2018-01-01 ENCOUNTER — OFFICE VISIT (OUTPATIENT)
Dept: ORTHOPEDICS | Facility: CLINIC | Age: 58
End: 2018-01-01
Payer: MEDICARE

## 2018-01-01 ENCOUNTER — OFFICE VISIT (OUTPATIENT)
Dept: DERMATOLOGY | Facility: CLINIC | Age: 58
End: 2018-01-01
Payer: MEDICARE

## 2018-01-01 ENCOUNTER — OFFICE VISIT (OUTPATIENT)
Dept: ELECTROPHYSIOLOGY | Facility: CLINIC | Age: 58
End: 2018-01-01
Payer: MEDICARE

## 2018-01-01 ENCOUNTER — HOSPITAL ENCOUNTER (EMERGENCY)
Facility: HOSPITAL | Age: 58
Discharge: HOME | End: 2018-12-31
Attending: EMERGENCY MEDICINE
Payer: MEDICARE

## 2018-01-01 ENCOUNTER — HOSPITAL ENCOUNTER (OUTPATIENT)
Dept: CARDIOLOGY | Facility: CLINIC | Age: 58
Discharge: HOME | End: 2018-08-31
Payer: MEDICARE

## 2018-01-01 ENCOUNTER — LAB VISIT (OUTPATIENT)
Dept: LAB | Facility: HOSPITAL | Age: 58
End: 2018-01-01
Payer: MEDICARE

## 2018-01-01 ENCOUNTER — CLINICAL SUPPORT (OUTPATIENT)
Dept: DIABETES | Facility: CLINIC | Age: 58
End: 2018-01-01
Payer: MEDICARE

## 2018-01-01 ENCOUNTER — OFFICE VISIT (OUTPATIENT)
Dept: VASCULAR SURGERY | Facility: CLINIC | Age: 58
End: 2018-01-01
Attending: SURGERY
Payer: MEDICARE

## 2018-01-01 ENCOUNTER — HOSPITAL ENCOUNTER (OUTPATIENT)
Dept: RADIOLOGY | Facility: HOSPITAL | Age: 58
Discharge: HOME | End: 2018-09-21
Attending: NURSE PRACTITIONER
Payer: MEDICARE

## 2018-01-01 ENCOUNTER — TELEPHONE (OUTPATIENT)
Dept: ORTHOPEDICS | Facility: CLINIC | Age: 58
End: 2018-01-01

## 2018-01-01 ENCOUNTER — CLINICAL SUPPORT (OUTPATIENT)
Dept: ELECTROPHYSIOLOGY | Facility: CLINIC | Age: 58
End: 2018-01-01
Attending: INTERNAL MEDICINE
Payer: MEDICARE

## 2018-01-01 ENCOUNTER — HOSPITAL ENCOUNTER (OUTPATIENT)
Dept: RADIOLOGY | Facility: HOSPITAL | Age: 58
Discharge: HOME | End: 2018-08-31
Attending: PHYSICIAN ASSISTANT
Payer: MEDICARE

## 2018-01-01 ENCOUNTER — OFFICE VISIT (OUTPATIENT)
Dept: INTERNAL MEDICINE | Facility: CLINIC | Age: 58
End: 2018-01-01
Payer: MEDICARE

## 2018-01-01 ENCOUNTER — TELEPHONE (OUTPATIENT)
Dept: INTERNAL MEDICINE | Facility: CLINIC | Age: 58
End: 2018-01-01

## 2018-01-01 ENCOUNTER — OFFICE VISIT (OUTPATIENT)
Dept: OBSTETRICS AND GYNECOLOGY | Facility: CLINIC | Age: 58
End: 2018-01-01
Payer: MEDICARE

## 2018-01-01 ENCOUNTER — DOCUMENTATION ONLY (OUTPATIENT)
Dept: REHABILITATION | Facility: HOSPITAL | Age: 58
End: 2018-01-01

## 2018-01-01 VITALS
HEIGHT: 68 IN | HEART RATE: 70 BPM | SYSTOLIC BLOOD PRESSURE: 110 MMHG | OXYGEN SATURATION: 95 % | DIASTOLIC BLOOD PRESSURE: 80 MMHG | BODY MASS INDEX: 39.92 KG/M2 | WEIGHT: 263.44 LBS

## 2018-01-01 VITALS
DIASTOLIC BLOOD PRESSURE: 79 MMHG | HEART RATE: 69 BPM | SYSTOLIC BLOOD PRESSURE: 124 MMHG | BODY MASS INDEX: 40.62 KG/M2 | WEIGHT: 268 LBS | RESPIRATION RATE: 22 BRPM | HEIGHT: 68 IN | TEMPERATURE: 98 F | OXYGEN SATURATION: 100 %

## 2018-01-01 VITALS
HEART RATE: 70 BPM | DIASTOLIC BLOOD PRESSURE: 75 MMHG | SYSTOLIC BLOOD PRESSURE: 110 MMHG | WEIGHT: 268.06 LBS | BODY MASS INDEX: 40.63 KG/M2 | HEIGHT: 68 IN

## 2018-01-01 VITALS
HEIGHT: 68 IN | SYSTOLIC BLOOD PRESSURE: 110 MMHG | SYSTOLIC BLOOD PRESSURE: 110 MMHG | DIASTOLIC BLOOD PRESSURE: 70 MMHG | HEART RATE: 72 BPM | DIASTOLIC BLOOD PRESSURE: 75 MMHG | TEMPERATURE: 98 F | HEART RATE: 70 BPM | WEIGHT: 264.31 LBS | WEIGHT: 268.06 LBS | BODY MASS INDEX: 40.06 KG/M2 | HEIGHT: 68 IN | OXYGEN SATURATION: 97 % | BODY MASS INDEX: 40.63 KG/M2

## 2018-01-01 VITALS
BODY MASS INDEX: 40.03 KG/M2 | SYSTOLIC BLOOD PRESSURE: 98 MMHG | HEIGHT: 68 IN | WEIGHT: 264.13 LBS | DIASTOLIC BLOOD PRESSURE: 70 MMHG

## 2018-01-01 VITALS
DIASTOLIC BLOOD PRESSURE: 58 MMHG | TEMPERATURE: 98 F | SYSTOLIC BLOOD PRESSURE: 118 MMHG | HEART RATE: 70 BPM | OXYGEN SATURATION: 90 % | WEIGHT: 262.38 LBS | HEART RATE: 70 BPM | HEIGHT: 68 IN | BODY MASS INDEX: 39.7 KG/M2 | DIASTOLIC BLOOD PRESSURE: 68 MMHG | HEIGHT: 69 IN | SYSTOLIC BLOOD PRESSURE: 111 MMHG | WEIGHT: 261.94 LBS | BODY MASS INDEX: 38.86 KG/M2

## 2018-01-01 VITALS
HEART RATE: 70 BPM | SYSTOLIC BLOOD PRESSURE: 124 MMHG | DIASTOLIC BLOOD PRESSURE: 80 MMHG | WEIGHT: 262.38 LBS | BODY MASS INDEX: 39.76 KG/M2 | HEIGHT: 68 IN

## 2018-01-01 VITALS
WEIGHT: 253 LBS | DIASTOLIC BLOOD PRESSURE: 74 MMHG | HEIGHT: 68 IN | BODY MASS INDEX: 38.34 KG/M2 | SYSTOLIC BLOOD PRESSURE: 106 MMHG | HEART RATE: 71 BPM

## 2018-01-01 DIAGNOSIS — Z99.2 END STAGE RENAL FAILURE ON DIALYSIS: ICD-10-CM

## 2018-01-01 DIAGNOSIS — N18.6 ESRD (END STAGE RENAL DISEASE) ON DIALYSIS: Chronic | ICD-10-CM

## 2018-01-01 DIAGNOSIS — E78.2 MIXED HYPERLIPIDEMIA: ICD-10-CM

## 2018-01-01 DIAGNOSIS — N18.6 ESRD (END STAGE RENAL DISEASE) ON DIALYSIS: ICD-10-CM

## 2018-01-01 DIAGNOSIS — I48.21 PERMANENT ATRIAL FIBRILLATION: ICD-10-CM

## 2018-01-01 DIAGNOSIS — I73.9 PVD (PERIPHERAL VASCULAR DISEASE): Primary | ICD-10-CM

## 2018-01-01 DIAGNOSIS — Z95.0 CARDIAC PACEMAKER IN SITU: ICD-10-CM

## 2018-01-01 DIAGNOSIS — I73.9 PVD (PERIPHERAL VASCULAR DISEASE): ICD-10-CM

## 2018-01-01 DIAGNOSIS — T82.858D AV GRAFT STENOSIS, SUBSEQUENT ENCOUNTER: Primary | ICD-10-CM

## 2018-01-01 DIAGNOSIS — Z79.52 CURRENT CHRONIC USE OF SYSTEMIC STEROIDS: ICD-10-CM

## 2018-01-01 DIAGNOSIS — I48.0 PAF (PAROXYSMAL ATRIAL FIBRILLATION): ICD-10-CM

## 2018-01-01 DIAGNOSIS — E66.01 MORBID OBESITY WITH BMI OF 40.0-44.9, ADULT: Chronic | ICD-10-CM

## 2018-01-01 DIAGNOSIS — Z79.01 CURRENT USE OF LONG TERM ANTICOAGULATION: ICD-10-CM

## 2018-01-01 DIAGNOSIS — N18.6 END STAGE RENAL FAILURE ON DIALYSIS: ICD-10-CM

## 2018-01-01 DIAGNOSIS — N18.6 END STAGE RENAL FAILURE ON DIALYSIS: Primary | ICD-10-CM

## 2018-01-01 DIAGNOSIS — T82.590D AV GRAFT MALFUNCTION, SUBSEQUENT ENCOUNTER: Primary | ICD-10-CM

## 2018-01-01 DIAGNOSIS — G56.03 BILATERAL CARPAL TUNNEL SYNDROME: Primary | ICD-10-CM

## 2018-01-01 DIAGNOSIS — E11.8 CONTROLLED TYPE 2 DIABETES MELLITUS WITH COMPLICATION, WITHOUT LONG-TERM CURRENT USE OF INSULIN: ICD-10-CM

## 2018-01-01 DIAGNOSIS — I48.0 PAF (PAROXYSMAL ATRIAL FIBRILLATION): Primary | ICD-10-CM

## 2018-01-01 DIAGNOSIS — M25.841: Primary | ICD-10-CM

## 2018-01-01 DIAGNOSIS — J98.4 CRLD (CHRONIC RESTRICTIVE LUNG DISEASE): ICD-10-CM

## 2018-01-01 DIAGNOSIS — E11.65 UNCONTROLLED TYPE 2 DIABETES MELLITUS WITH HYPERGLYCEMIA: Primary | ICD-10-CM

## 2018-01-01 DIAGNOSIS — Z78.0 POSTMENOPAUSE: ICD-10-CM

## 2018-01-01 DIAGNOSIS — I44.2 CHB (COMPLETE HEART BLOCK): ICD-10-CM

## 2018-01-01 DIAGNOSIS — M79.641 PAIN OF RIGHT HAND: ICD-10-CM

## 2018-01-01 DIAGNOSIS — Z99.2 ESRD (END STAGE RENAL DISEASE) ON DIALYSIS: Chronic | ICD-10-CM

## 2018-01-01 DIAGNOSIS — D63.1 ANEMIA IN ESRD (END-STAGE RENAL DISEASE): ICD-10-CM

## 2018-01-01 DIAGNOSIS — Z79.01 CURRENT USE OF LONG TERM ANTICOAGULATION: Primary | ICD-10-CM

## 2018-01-01 DIAGNOSIS — G40.909 SEIZURE DISORDER: ICD-10-CM

## 2018-01-01 DIAGNOSIS — T82.590A AV GRAFT MALFUNCTION: ICD-10-CM

## 2018-01-01 DIAGNOSIS — G63 POLYNEUROPATHY ASSOCIATED WITH UNDERLYING DISEASE: Primary | ICD-10-CM

## 2018-01-01 DIAGNOSIS — R06.02 SHORTNESS OF BREATH: ICD-10-CM

## 2018-01-01 DIAGNOSIS — Z99.2 ESRD (END STAGE RENAL DISEASE) ON DIALYSIS: ICD-10-CM

## 2018-01-01 DIAGNOSIS — J96.11 CHRONIC RESPIRATORY FAILURE WITH HYPOXIA: ICD-10-CM

## 2018-01-01 DIAGNOSIS — L97.511 SKIN ULCER OF TOE OF RIGHT FOOT, LIMITED TO BREAKDOWN OF SKIN: ICD-10-CM

## 2018-01-01 DIAGNOSIS — E27.49 SECONDARY ADRENAL INSUFFICIENCY: ICD-10-CM

## 2018-01-01 DIAGNOSIS — E87.5 HYPERKALEMIA: ICD-10-CM

## 2018-01-01 DIAGNOSIS — I50.9 CONGESTIVE HEART FAILURE, UNSPECIFIED HF CHRONICITY, UNSPECIFIED HEART FAILURE TYPE: Primary | ICD-10-CM

## 2018-01-01 DIAGNOSIS — I50.9 CONGESTIVE HEART FAILURE, UNSPECIFIED HF CHRONICITY, UNSPECIFIED HEART FAILURE TYPE: ICD-10-CM

## 2018-01-01 DIAGNOSIS — G63 POLYNEUROPATHY ASSOCIATED WITH UNDERLYING DISEASE: ICD-10-CM

## 2018-01-01 DIAGNOSIS — E04.2 MULTINODULAR THYROID: ICD-10-CM

## 2018-01-01 DIAGNOSIS — R74.8 ELEVATED ALKALINE PHOSPHATASE LEVEL: ICD-10-CM

## 2018-01-01 DIAGNOSIS — I50.42 CHRONIC COMBINED SYSTOLIC AND DIASTOLIC HEART FAILURE: ICD-10-CM

## 2018-01-01 DIAGNOSIS — M25.841: ICD-10-CM

## 2018-01-01 DIAGNOSIS — D86.0 PULMONARY SARCOIDOSIS: Primary | Chronic | ICD-10-CM

## 2018-01-01 DIAGNOSIS — Z00.00 ANNUAL PHYSICAL EXAM: Primary | ICD-10-CM

## 2018-01-01 DIAGNOSIS — I95.3 HEMODIALYSIS-ASSOCIATED HYPOTENSION: ICD-10-CM

## 2018-01-01 DIAGNOSIS — M62.838 MUSCLE SPASM: ICD-10-CM

## 2018-01-01 DIAGNOSIS — Z12.39 BREAST CANCER SCREENING: ICD-10-CM

## 2018-01-01 DIAGNOSIS — D86.9 SARCOIDOSIS: Primary | ICD-10-CM

## 2018-01-01 DIAGNOSIS — D86.0 PULMONARY SARCOIDOSIS: ICD-10-CM

## 2018-01-01 DIAGNOSIS — G62.9 NEUROPATHY: ICD-10-CM

## 2018-01-01 DIAGNOSIS — Z95.0 BIVENTRICULAR CARDIAC PACEMAKER IN SITU: Primary | ICD-10-CM

## 2018-01-01 DIAGNOSIS — Z01.419 WELL WOMAN EXAM WITH ROUTINE GYNECOLOGICAL EXAM: Primary | ICD-10-CM

## 2018-01-01 DIAGNOSIS — J44.9 CHRONIC OBSTRUCTIVE PULMONARY DISEASE, UNSPECIFIED COPD TYPE: ICD-10-CM

## 2018-01-01 DIAGNOSIS — Z99.2 END STAGE RENAL FAILURE ON DIALYSIS: Primary | ICD-10-CM

## 2018-01-01 DIAGNOSIS — I50.42 CHRONIC COMBINED SYSTOLIC AND DIASTOLIC HEART FAILURE: Chronic | ICD-10-CM

## 2018-01-01 DIAGNOSIS — N18.6 ANEMIA IN ESRD (END-STAGE RENAL DISEASE): ICD-10-CM

## 2018-01-01 DIAGNOSIS — D86.0 PULMONARY SARCOIDOSIS: Chronic | ICD-10-CM

## 2018-01-01 DIAGNOSIS — I27.20 PULMONARY HYPERTENSION: ICD-10-CM

## 2018-01-01 DIAGNOSIS — M85.80 OSTEOPENIA DETERMINED BY X-RAY: ICD-10-CM

## 2018-01-01 DIAGNOSIS — R74.8 ELEVATED ALKALINE PHOSPHATASE LEVEL: Primary | ICD-10-CM

## 2018-01-01 DIAGNOSIS — L87.0 KYRLE'S DISEASE: Primary | ICD-10-CM

## 2018-01-01 DIAGNOSIS — I42.9 CARDIOMYOPATHY, UNSPECIFIED TYPE: ICD-10-CM

## 2018-01-01 LAB
25(OH)D3+25(OH)D2 SERPL-MCNC: 31 NG/ML
ALBUMIN SERPL BCP-MCNC: 3.5 G/DL
ALP SERPL-CCNC: 249 U/L
ALT SERPL W/O P-5'-P-CCNC: 23 U/L
ANION GAP SERPL CALC-SCNC: 15 MMOL/L
ANION GAP SERPL CALC-SCNC: 16 MMOL/L
APTT BLDCRRT: 36.7 SEC
AST SERPL-CCNC: 26 U/L
BASOPHILS # BLD AUTO: 0.03 K/UL
BASOPHILS NFR BLD: 0.3 %
BILIRUB SERPL-MCNC: 0.8 MG/DL
BNP SERPL-MCNC: 673 PG/ML
BUN SERPL-MCNC: 50 MG/DL
BUN SERPL-MCNC: 53 MG/DL
CALCIUM SERPL-MCNC: 10.2 MG/DL
CALCIUM SERPL-MCNC: 8.3 MG/DL
CHLORIDE SERPL-SCNC: 100 MMOL/L
CHLORIDE SERPL-SCNC: 93 MMOL/L
CO2 SERPL-SCNC: 23 MMOL/L
CO2 SERPL-SCNC: 28 MMOL/L
CREAT SERPL-MCNC: 6.4 MG/DL
CREAT SERPL-MCNC: 7.8 MG/DL
DIFFERENTIAL METHOD: ABNORMAL
EOSINOPHIL # BLD AUTO: 0 K/UL
EOSINOPHIL NFR BLD: 0.1 %
ERYTHROCYTE [DISTWIDTH] IN BLOOD BY AUTOMATED COUNT: 15.3 %
EST. GFR  (AFRICAN AMERICAN): 6 ML/MIN/1.73 M^2
EST. GFR  (AFRICAN AMERICAN): 8 ML/MIN/1.73 M^2
EST. GFR  (NON AFRICAN AMERICAN): 5.2 ML/MIN/1.73 M^2
EST. GFR  (NON AFRICAN AMERICAN): 7 ML/MIN/1.73 M^2
ESTIMATED AVG GLUCOSE: 146 MG/DL
GGT SERPL-CCNC: 54 U/L
GLUCOSE SERPL-MCNC: 122 MG/DL
GLUCOSE SERPL-MCNC: 97 MG/DL
HBA1C MFR BLD HPLC: 6.7 %
HCT VFR BLD AUTO: 39.4 %
HGB BLD-MCNC: 11.7 G/DL
IMM GRANULOCYTES # BLD AUTO: 0.06 K/UL
IMM GRANULOCYTES NFR BLD AUTO: 0.6 %
INR PPP: 1.6
INR PPP: 1.7 (ref 2–3)
INR PPP: 1.8
INR PPP: 2 (ref 2–3)
INR PPP: 2.1 (ref 2–3)
INR PPP: 2.3
INR PPP: 2.6 (ref 2–3)
INR PPP: 2.7 (ref 2–3)
LYMPHOCYTES # BLD AUTO: 1 K/UL
LYMPHOCYTES NFR BLD: 9.5 %
MCH RBC QN AUTO: 27.7 PG
MCHC RBC AUTO-ENTMCNC: 29.7 G/DL
MCV RBC AUTO: 93 FL
MONOCYTES # BLD AUTO: 1.3 K/UL
MONOCYTES NFR BLD: 12.7 %
NEUTROPHILS # BLD AUTO: 8.1 K/UL
NEUTROPHILS NFR BLD: 76.8 %
NRBC BLD-RTO: 0 /100 WBC
PLATELET # BLD AUTO: 222 K/UL
PMV BLD AUTO: 12.5 FL
POTASSIUM SERPL-SCNC: 4.9 MMOL/L
POTASSIUM SERPL-SCNC: 5.2 MMOL/L
PROT SERPL-MCNC: 7.7 G/DL
PROTHROMBIN TIME: 15.5 SEC
PROTHROMBIN TIME: 19.2 SEC
PROTHROMBIN TIME: 24.8 SEC
RBC # BLD AUTO: 4.23 M/UL
SODIUM SERPL-SCNC: 136 MMOL/L
SODIUM SERPL-SCNC: 139 MMOL/L
TROPONIN I SERPL DL<=0.01 NG/ML-MCNC: 0.16 NG/ML
TSH SERPL DL<=0.005 MIU/L-ACNC: 0.63 UIU/ML
WBC # BLD AUTO: 10.54 K/UL

## 2018-01-01 PROCEDURE — 85610 PROTHROMBIN TIME: CPT | Mod: PBBFAC

## 2018-01-01 PROCEDURE — 3008F BODY MASS INDEX DOCD: CPT | Mod: CPTII,,, | Performed by: SURGERY

## 2018-01-01 PROCEDURE — 93010 ELECTROCARDIOGRAM REPORT: CPT | Mod: ,,, | Performed by: INTERNAL MEDICINE

## 2018-01-01 PROCEDURE — 84443 ASSAY THYROID STIM HORMONE: CPT

## 2018-01-01 PROCEDURE — 85610 PROTHROMBIN TIME: CPT

## 2018-01-01 PROCEDURE — 82977 ASSAY OF GGT: CPT

## 2018-01-01 PROCEDURE — 99214 OFFICE O/P EST MOD 30 MIN: CPT | Mod: S$GLB,,, | Performed by: NURSE PRACTITIONER

## 2018-01-01 PROCEDURE — 95913 NRV CNDJ TEST 13/> STUDIES: CPT | Mod: S$GLB,,, | Performed by: PSYCHIATRY & NEUROLOGY

## 2018-01-01 PROCEDURE — 84484 ASSAY OF TROPONIN QUANT: CPT

## 2018-01-01 PROCEDURE — 76882 US LMTD JT/FCL EVL NVASC XTR: CPT | Mod: 26,,, | Performed by: RADIOLOGY

## 2018-01-01 PROCEDURE — 77067 SCR MAMMO BI INCL CAD: CPT | Mod: 26,,, | Performed by: RADIOLOGY

## 2018-01-01 PROCEDURE — 99499 UNLISTED E&M SERVICE: CPT | Mod: S$GLB,,, | Performed by: NURSE PRACTITIONER

## 2018-01-01 PROCEDURE — 99213 OFFICE O/P EST LOW 20 MIN: CPT | Mod: S$PBB,,, | Performed by: DERMATOLOGY

## 2018-01-01 PROCEDURE — 99211 OFF/OP EST MAY X REQ PHY/QHP: CPT | Mod: S$PBB,25,, | Performed by: INTERNAL MEDICINE

## 2018-01-01 PROCEDURE — 85610 PROTHROMBIN TIME: CPT | Mod: QW,S$GLB,, | Performed by: INTERNAL MEDICINE

## 2018-01-01 PROCEDURE — 82306 VITAMIN D 25 HYDROXY: CPT

## 2018-01-01 PROCEDURE — 93010 EKG 12-LEAD: ICD-10-PCS | Mod: ,,, | Performed by: INTERNAL MEDICINE

## 2018-01-01 PROCEDURE — 36415 COLL VENOUS BLD VENIPUNCTURE: CPT

## 2018-01-01 PROCEDURE — 3008F BODY MASS INDEX DOCD: CPT | Mod: CPTII,S$GLB,, | Performed by: NURSE PRACTITIONER

## 2018-01-01 PROCEDURE — 99214 OFFICE O/P EST MOD 30 MIN: CPT | Mod: PBBFAC | Performed by: NURSE PRACTITIONER

## 2018-01-01 PROCEDURE — 99211 OFF/OP EST MAY X REQ PHY/QHP: CPT | Mod: 25,S$GLB,, | Performed by: PHARMACIST

## 2018-01-01 PROCEDURE — 93296 REM INTERROG EVL PM/IDS: CPT

## 2018-01-01 PROCEDURE — 25000003 PHARM REV CODE 250: Performed by: STUDENT IN AN ORGANIZED HEALTH CARE EDUCATION/TRAINING PROGRAM

## 2018-01-01 PROCEDURE — 99999 PR PBB SHADOW E&M-EST. PATIENT-LVL III: CPT | Mod: PBBFAC,,, | Performed by: INTERNAL MEDICINE

## 2018-01-01 PROCEDURE — 3008F BODY MASS INDEX DOCD: CPT | Mod: CPTII,,, | Performed by: INTERNAL MEDICINE

## 2018-01-01 PROCEDURE — 99285 EMERGENCY DEPT VISIT HI MDM: CPT | Mod: ,,, | Performed by: EMERGENCY MEDICINE

## 2018-01-01 PROCEDURE — 99999 PR PBB SHADOW E&M-EST. PATIENT-LVL IV: CPT | Mod: PBBFAC,,, | Performed by: INTERNAL MEDICINE

## 2018-01-01 PROCEDURE — 93990 DOPPLER FLOW TESTING: CPT | Mod: S$GLB,,, | Performed by: SURGERY

## 2018-01-01 PROCEDURE — 99214 OFFICE O/P EST MOD 30 MIN: CPT | Mod: S$PBB,,, | Performed by: SURGERY

## 2018-01-01 PROCEDURE — 93000 ELECTROCARDIOGRAM COMPLETE: CPT | Mod: S$GLB,,, | Performed by: INTERNAL MEDICINE

## 2018-01-01 PROCEDURE — 99999 PR PBB SHADOW E&M-EST. PATIENT-LVL II: CPT | Mod: PBBFAC,,, | Performed by: DERMATOLOGY

## 2018-01-01 PROCEDURE — 99999 PR PBB SHADOW E&M-EST. PATIENT-LVL III: CPT | Mod: PBBFAC,,, | Performed by: ORTHOPAEDIC SURGERY

## 2018-01-01 PROCEDURE — 76882 US LMTD JT/FCL EVL NVASC XTR: CPT | Mod: TC

## 2018-01-01 PROCEDURE — 99213 OFFICE O/P EST LOW 20 MIN: CPT | Mod: S$GLB,,, | Performed by: PSYCHIATRY & NEUROLOGY

## 2018-01-01 PROCEDURE — 99213 OFFICE O/P EST LOW 20 MIN: CPT | Mod: S$GLB,,, | Performed by: ORTHOPAEDIC SURGERY

## 2018-01-01 PROCEDURE — 99213 OFFICE O/P EST LOW 20 MIN: CPT | Mod: S$PBB,,, | Performed by: INTERNAL MEDICINE

## 2018-01-01 PROCEDURE — 99214 OFFICE O/P EST MOD 30 MIN: CPT | Mod: PBBFAC,25 | Performed by: SURGERY

## 2018-01-01 PROCEDURE — 99213 OFFICE O/P EST LOW 20 MIN: CPT | Mod: S$GLB,,, | Performed by: INTERNAL MEDICINE

## 2018-01-01 PROCEDURE — 83880 ASSAY OF NATRIURETIC PEPTIDE: CPT

## 2018-01-01 PROCEDURE — 83036 HEMOGLOBIN GLYCOSYLATED A1C: CPT

## 2018-01-01 PROCEDURE — 93005 ELECTROCARDIOGRAM TRACING: CPT

## 2018-01-01 PROCEDURE — 99285 PR EMERGENCY DEPT VISIT,LEVEL V: ICD-10-PCS | Mod: ,,, | Performed by: EMERGENCY MEDICINE

## 2018-01-01 PROCEDURE — 80048 BASIC METABOLIC PNL TOTAL CA: CPT

## 2018-01-01 PROCEDURE — 93990 DOPPLER FLOW TESTING: CPT | Mod: PBBFAC | Performed by: SURGERY

## 2018-01-01 PROCEDURE — 3008F BODY MASS INDEX DOCD: CPT | Mod: CPTII,S$GLB,, | Performed by: ORTHOPAEDIC SURGERY

## 2018-01-01 PROCEDURE — 85025 COMPLETE CBC W/AUTO DIFF WBC: CPT

## 2018-01-01 PROCEDURE — 3008F BODY MASS INDEX DOCD: CPT | Mod: CPTII,S$GLB,, | Performed by: PHYSICIAN ASSISTANT

## 2018-01-01 PROCEDURE — 99999 PR PBB SHADOW E&M-EST. PATIENT-LVL III: CPT | Mod: PBBFAC,,, | Performed by: SURGERY

## 2018-01-01 PROCEDURE — 85730 THROMBOPLASTIN TIME PARTIAL: CPT

## 2018-01-01 PROCEDURE — 99999 PR PBB SHADOW E&M-EST. PATIENT-LVL V: CPT | Mod: PBBFAC,,, | Performed by: PHYSICIAN ASSISTANT

## 2018-01-01 PROCEDURE — 99285 EMERGENCY DEPT VISIT HI MDM: CPT | Mod: 25

## 2018-01-01 PROCEDURE — G0101 CA SCREEN;PELVIC/BREAST EXAM: HCPCS | Mod: S$PBB,,, | Performed by: NURSE PRACTITIONER

## 2018-01-01 PROCEDURE — 93990 DOPPLER FLOW TESTING: CPT | Mod: 26,S$PBB,, | Performed by: SURGERY

## 2018-01-01 PROCEDURE — 99999 PR PBB SHADOW E&M-EST. PATIENT-LVL III: CPT | Mod: PBBFAC,,, | Performed by: NURSE PRACTITIONER

## 2018-01-01 PROCEDURE — 99999 PR PBB SHADOW E&M-EST. PATIENT-LVL III: CPT | Mod: PBBFAC,,, | Performed by: PSYCHIATRY & NEUROLOGY

## 2018-01-01 PROCEDURE — 3044F HG A1C LEVEL LT 7.0%: CPT | Mod: CPTII,S$GLB,, | Performed by: INTERNAL MEDICINE

## 2018-01-01 PROCEDURE — 99213 OFFICE O/P EST LOW 20 MIN: CPT | Mod: PBBFAC | Performed by: SURGERY

## 2018-01-01 PROCEDURE — 99213 OFFICE O/P EST LOW 20 MIN: CPT | Mod: PBBFAC | Performed by: INTERNAL MEDICINE

## 2018-01-01 PROCEDURE — 99999 PR PBB SHADOW E&M-EST. PATIENT-LVL IV: CPT | Mod: PBBFAC,,, | Performed by: NURSE PRACTITIONER

## 2018-01-01 PROCEDURE — 93281 PM DEVICE PROGR EVAL MULTI: CPT | Mod: PBBFAC | Performed by: INTERNAL MEDICINE

## 2018-01-01 PROCEDURE — 3008F BODY MASS INDEX DOCD: CPT | Mod: CPTII,S$GLB,, | Performed by: PSYCHIATRY & NEUROLOGY

## 2018-01-01 PROCEDURE — 95886 MUSC TEST DONE W/N TEST COMP: CPT | Mod: S$GLB,,, | Performed by: PSYCHIATRY & NEUROLOGY

## 2018-01-01 PROCEDURE — G0108 DIAB MANAGE TRN  PER INDIV: HCPCS | Mod: S$GLB,,, | Performed by: DIETITIAN, REGISTERED

## 2018-01-01 PROCEDURE — 80053 COMPREHEN METABOLIC PANEL: CPT

## 2018-01-01 PROCEDURE — 99213 OFFICE O/P EST LOW 20 MIN: CPT | Mod: S$GLB,,, | Performed by: PHYSICIAN ASSISTANT

## 2018-01-01 PROCEDURE — 99999 PR PBB SHADOW E&M-EST. PATIENT-LVL IV: CPT | Mod: PBBFAC,,, | Performed by: SURGERY

## 2018-01-01 PROCEDURE — 77067 SCR MAMMO BI INCL CAD: CPT | Mod: TC

## 2018-01-01 PROCEDURE — 93294 REM INTERROG EVL PM/LDLS PM: CPT | Mod: ,,, | Performed by: INTERNAL MEDICINE

## 2018-01-01 RX ORDER — PREDNISONE 20 MG/1
10 TABLET ORAL DAILY
Qty: 30 TABLET | Refills: 0 | Status: SHIPPED | OUTPATIENT
Start: 2018-01-01 | End: 2018-01-01

## 2018-01-01 RX ORDER — PREGABALIN 100 MG/1
100 CAPSULE ORAL 2 TIMES DAILY
Qty: 180 CAPSULE | Refills: 1 | Status: SHIPPED | OUTPATIENT
Start: 2018-01-01 | End: 2019-01-01 | Stop reason: SDUPTHER

## 2018-01-01 RX ORDER — ATORVASTATIN CALCIUM 80 MG/1
TABLET, FILM COATED ORAL
Qty: 90 TABLET | Refills: 0 | Status: SHIPPED | OUTPATIENT
Start: 2018-01-01 | End: 2019-01-01 | Stop reason: SDUPTHER

## 2018-01-01 RX ORDER — BETAMETHASONE DIPROPIONATE 0.5 MG/G
OINTMENT TOPICAL
Refills: 2 | COMMUNITY
Start: 2018-01-01 | End: 2018-01-01 | Stop reason: SDUPTHER

## 2018-01-01 RX ORDER — GABAPENTIN 300 MG/1
300 CAPSULE ORAL ONCE
Status: COMPLETED | OUTPATIENT
Start: 2018-01-01 | End: 2018-01-01

## 2018-01-01 RX ORDER — BETAMETHASONE DIPROPIONATE 0.5 MG/G
OINTMENT TOPICAL 2 TIMES DAILY
Qty: 45 G | Refills: 2 | Status: SHIPPED | OUTPATIENT
Start: 2018-01-01 | End: 2018-01-01

## 2018-01-01 RX ORDER — NORTRIPTYLINE HYDROCHLORIDE 25 MG/1
CAPSULE ORAL
Qty: 90 CAPSULE | Refills: 1 | Status: SHIPPED | OUTPATIENT
Start: 2018-01-01 | End: 2018-01-01

## 2018-01-01 RX ORDER — INSULIN PUMP SYRINGE, 3 ML
EACH MISCELLANEOUS
Qty: 1 EACH | Refills: 0 | Status: ON HOLD | OUTPATIENT
Start: 2018-01-01 | End: 2019-01-01

## 2018-01-01 RX ORDER — TETANUS TOXOID, REDUCED DIPHTHERIA TOXOID AND ACELLULAR PERTUSSIS VACCINE, ADSORBED 5; 2.5; 8; 8; 2.5 [IU]/.5ML; [IU]/.5ML; UG/.5ML; UG/.5ML; UG/.5ML
SUSPENSION INTRAMUSCULAR
Refills: 0 | Status: ON HOLD | COMMUNITY
Start: 2018-08-01 | End: 2019-01-01 | Stop reason: HOSPADM

## 2018-01-01 RX ORDER — TIZANIDINE 4 MG/1
TABLET ORAL
Qty: 90 TABLET | Refills: 0 | Status: SHIPPED | OUTPATIENT
Start: 2018-01-01 | End: 2019-01-01 | Stop reason: SDUPTHER

## 2018-01-01 RX ORDER — PREDNISONE 20 MG/1
20 TABLET ORAL DAILY
Qty: 30 TABLET | Refills: 0 | Status: SHIPPED | OUTPATIENT
Start: 2018-01-01 | End: 2018-01-01

## 2018-01-01 RX ORDER — GABAPENTIN 600 MG/1
TABLET ORAL
Qty: 180 TABLET | Refills: 1 | Status: SHIPPED | OUTPATIENT
Start: 2018-01-01 | End: 2019-01-01

## 2018-01-01 RX ORDER — LANCETS
1 EACH MISCELLANEOUS DAILY
Qty: 100 EACH | Refills: 3 | Status: ON HOLD | OUTPATIENT
Start: 2018-01-01 | End: 2019-01-01 | Stop reason: HOSPADM

## 2018-01-01 RX ADMIN — GABAPENTIN 300 MG: 300 CAPSULE ORAL at 01:12

## 2018-01-02 ENCOUNTER — HOSPITAL ENCOUNTER (OUTPATIENT)
Facility: HOSPITAL | Age: 58
Discharge: HOME | End: 2018-01-03
Attending: EMERGENCY MEDICINE | Admitting: HOSPITALIST
Payer: MEDICARE

## 2018-01-02 DIAGNOSIS — J96.21 ACUTE ON CHRONIC RESPIRATORY FAILURE WITH HYPOXIA: Primary | ICD-10-CM

## 2018-01-02 DIAGNOSIS — R06.00 DYSPNEA: ICD-10-CM

## 2018-01-02 DIAGNOSIS — E87.5 HYPERKALEMIA: ICD-10-CM

## 2018-01-02 DIAGNOSIS — J81.0 ACUTE PULMONARY EDEMA: ICD-10-CM

## 2018-01-02 PROBLEM — R73.9 HYPERGLYCEMIA: Status: ACTIVE | Noted: 2018-01-02

## 2018-01-02 LAB
ALBUMIN SERPL BCP-MCNC: 3 G/DL
ALP SERPL-CCNC: 172 U/L
ALT SERPL W/O P-5'-P-CCNC: 40 U/L
ANION GAP SERPL CALC-SCNC: 18 MMOL/L
AST SERPL-CCNC: 48 U/L
BASOPHILS # BLD AUTO: 0.02 K/UL
BASOPHILS NFR BLD: 0.2 %
BILIRUB SERPL-MCNC: 0.5 MG/DL
BNP SERPL-MCNC: 933 PG/ML
BUN SERPL-MCNC: 54 MG/DL
CALCIUM SERPL-MCNC: 9 MG/DL
CHLORIDE SERPL-SCNC: 103 MMOL/L
CO2 SERPL-SCNC: 16 MMOL/L
CREAT SERPL-MCNC: 9.7 MG/DL
DIFFERENTIAL METHOD: ABNORMAL
EOSINOPHIL # BLD AUTO: 0.1 K/UL
EOSINOPHIL NFR BLD: 0.8 %
ERYTHROCYTE [DISTWIDTH] IN BLOOD BY AUTOMATED COUNT: 14.6 %
EST. GFR  (AFRICAN AMERICAN): 4.7 ML/MIN/1.73 M^2
EST. GFR  (NON AFRICAN AMERICAN): 4 ML/MIN/1.73 M^2
ESTIMATED AVG GLUCOSE: 143 MG/DL
GLUCOSE SERPL-MCNC: 214 MG/DL
HBA1C MFR BLD HPLC: 6.6 %
HCT VFR BLD AUTO: 46.4 %
HGB BLD-MCNC: 13.6 G/DL
IMM GRANULOCYTES # BLD AUTO: 0.04 K/UL
IMM GRANULOCYTES NFR BLD AUTO: 0.5 %
INR PPP: 2.3
LYMPHOCYTES # BLD AUTO: 0.7 K/UL
LYMPHOCYTES NFR BLD: 8.4 %
MCH RBC QN AUTO: 29.4 PG
MCHC RBC AUTO-ENTMCNC: 29.3 G/DL
MCV RBC AUTO: 100 FL
MONOCYTES # BLD AUTO: 1.1 K/UL
MONOCYTES NFR BLD: 12.1 %
NEUTROPHILS # BLD AUTO: 6.8 K/UL
NEUTROPHILS NFR BLD: 78 %
NRBC BLD-RTO: 0 /100 WBC
PLATELET # BLD AUTO: 196 K/UL
PMV BLD AUTO: 10.7 FL
POCT GLUCOSE: 94 MG/DL (ref 70–110)
POTASSIUM SERPL-SCNC: 5.5 MMOL/L
PROCALCITONIN SERPL IA-MCNC: 0.46 NG/ML
PROT SERPL-MCNC: 7 G/DL
PROTHROMBIN TIME: 22.7 SEC
RBC # BLD AUTO: 4.63 M/UL
SODIUM SERPL-SCNC: 137 MMOL/L
TROPONIN I SERPL DL<=0.01 NG/ML-MCNC: 0.16 NG/ML
WBC # BLD AUTO: 8.77 K/UL

## 2018-01-02 PROCEDURE — 63600175 PHARM REV CODE 636 W HCPCS: Mod: JG | Performed by: HOSPITALIST

## 2018-01-02 PROCEDURE — 99285 EMERGENCY DEPT VISIT HI MDM: CPT | Mod: ,,, | Performed by: EMERGENCY MEDICINE

## 2018-01-02 PROCEDURE — 85025 COMPLETE CBC W/AUTO DIFF WBC: CPT

## 2018-01-02 PROCEDURE — G0378 HOSPITAL OBSERVATION PER HR: HCPCS

## 2018-01-02 PROCEDURE — 94640 AIRWAY INHALATION TREATMENT: CPT

## 2018-01-02 PROCEDURE — 83880 ASSAY OF NATRIURETIC PEPTIDE: CPT

## 2018-01-02 PROCEDURE — 85610 PROTHROMBIN TIME: CPT

## 2018-01-02 PROCEDURE — 93010 ELECTROCARDIOGRAM REPORT: CPT | Mod: ,,, | Performed by: INTERNAL MEDICINE

## 2018-01-02 PROCEDURE — 25000242 PHARM REV CODE 250 ALT 637 W/ HCPCS: Performed by: STUDENT IN AN ORGANIZED HEALTH CARE EDUCATION/TRAINING PROGRAM

## 2018-01-02 PROCEDURE — 25000003 PHARM REV CODE 250: Performed by: HOSPITALIST

## 2018-01-02 PROCEDURE — 27000221 HC OXYGEN, UP TO 24 HOURS

## 2018-01-02 PROCEDURE — 84484 ASSAY OF TROPONIN QUANT: CPT

## 2018-01-02 PROCEDURE — 84145 PROCALCITONIN (PCT): CPT

## 2018-01-02 PROCEDURE — G0257 UNSCHED DIALYSIS ESRD PT HOS: HCPCS

## 2018-01-02 PROCEDURE — 99214 OFFICE O/P EST MOD 30 MIN: CPT | Mod: ,,, | Performed by: INTERNAL MEDICINE

## 2018-01-02 PROCEDURE — 25000003 PHARM REV CODE 250: Performed by: STUDENT IN AN ORGANIZED HEALTH CARE EDUCATION/TRAINING PROGRAM

## 2018-01-02 PROCEDURE — 93005 ELECTROCARDIOGRAM TRACING: CPT

## 2018-01-02 PROCEDURE — 87040 BLOOD CULTURE FOR BACTERIA: CPT

## 2018-01-02 PROCEDURE — 99285 EMERGENCY DEPT VISIT HI MDM: CPT | Mod: 25

## 2018-01-02 PROCEDURE — 99220 PR INITIAL OBSERVATION CARE,LEVL III: CPT | Mod: GC,,, | Performed by: HOSPITALIST

## 2018-01-02 PROCEDURE — 83036 HEMOGLOBIN GLYCOSYLATED A1C: CPT

## 2018-01-02 PROCEDURE — 80053 COMPREHEN METABOLIC PANEL: CPT

## 2018-01-02 RX ORDER — CINACALCET 30 MG/1
30 TABLET, FILM COATED ORAL
Status: DISCONTINUED | OUTPATIENT
Start: 2018-01-03 | End: 2018-01-03 | Stop reason: HOSPADM

## 2018-01-02 RX ORDER — WARFARIN SODIUM 5 MG/1
5 TABLET ORAL DAILY
Status: DISCONTINUED | OUTPATIENT
Start: 2018-01-03 | End: 2018-01-03 | Stop reason: HOSPADM

## 2018-01-02 RX ORDER — LEVETIRACETAM 500 MG/1
500 TABLET ORAL 2 TIMES DAILY
Status: DISCONTINUED | OUTPATIENT
Start: 2018-01-02 | End: 2018-01-03 | Stop reason: HOSPADM

## 2018-01-02 RX ORDER — PREDNISOLONE ACETATE 10 MG/ML
1 SUSPENSION/ DROPS OPHTHALMIC 2 TIMES DAILY
Status: DISCONTINUED | OUTPATIENT
Start: 2018-01-02 | End: 2018-01-03 | Stop reason: HOSPADM

## 2018-01-02 RX ORDER — SODIUM CHLORIDE 0.9 % (FLUSH) 0.9 %
5 SYRINGE (ML) INJECTION
Status: DISCONTINUED | OUTPATIENT
Start: 2018-01-02 | End: 2018-01-03 | Stop reason: HOSPADM

## 2018-01-02 RX ORDER — IBUPROFEN 200 MG
16 TABLET ORAL
Status: DISCONTINUED | OUTPATIENT
Start: 2018-01-02 | End: 2018-01-03 | Stop reason: HOSPADM

## 2018-01-02 RX ORDER — PANTOPRAZOLE SODIUM 40 MG/1
40 TABLET, DELAYED RELEASE ORAL DAILY
Status: DISCONTINUED | OUTPATIENT
Start: 2018-01-03 | End: 2018-01-03 | Stop reason: HOSPADM

## 2018-01-02 RX ORDER — NORTRIPTYLINE HYDROCHLORIDE 25 MG/1
25 CAPSULE ORAL NIGHTLY
Status: DISCONTINUED | OUTPATIENT
Start: 2018-01-02 | End: 2018-01-03 | Stop reason: HOSPADM

## 2018-01-02 RX ORDER — TIZANIDINE 2 MG/1
2 TABLET ORAL DAILY PRN
Status: DISCONTINUED | OUTPATIENT
Start: 2018-01-02 | End: 2018-01-03 | Stop reason: HOSPADM

## 2018-01-02 RX ORDER — ONDANSETRON 4 MG/1
8 TABLET, ORALLY DISINTEGRATING ORAL EVERY 8 HOURS PRN
Status: DISCONTINUED | OUTPATIENT
Start: 2018-01-02 | End: 2018-01-03 | Stop reason: HOSPADM

## 2018-01-02 RX ORDER — IPRATROPIUM BROMIDE AND ALBUTEROL SULFATE 2.5; .5 MG/3ML; MG/3ML
3 SOLUTION RESPIRATORY (INHALATION)
Status: DISCONTINUED | OUTPATIENT
Start: 2018-01-02 | End: 2018-01-03 | Stop reason: HOSPADM

## 2018-01-02 RX ORDER — ACETAMINOPHEN 325 MG/1
650 TABLET ORAL EVERY 4 HOURS PRN
Status: DISCONTINUED | OUTPATIENT
Start: 2018-01-02 | End: 2018-01-03 | Stop reason: HOSPADM

## 2018-01-02 RX ORDER — SEVELAMER CARBONATE 800 MG/1
800 TABLET, FILM COATED ORAL
Status: DISCONTINUED | OUTPATIENT
Start: 2018-01-02 | End: 2018-01-03 | Stop reason: HOSPADM

## 2018-01-02 RX ORDER — POLYETHYLENE GLYCOL 3350 17 G/17G
17 POWDER, FOR SOLUTION ORAL DAILY
Status: DISCONTINUED | OUTPATIENT
Start: 2018-01-02 | End: 2018-01-03 | Stop reason: HOSPADM

## 2018-01-02 RX ORDER — SODIUM CHLORIDE 9 MG/ML
INJECTION, SOLUTION INTRAVENOUS
Status: DISCONTINUED | OUTPATIENT
Start: 2018-01-02 | End: 2018-01-03

## 2018-01-02 RX ORDER — DIGOXIN 125 MCG
0.12 TABLET ORAL EVERY OTHER DAY
Status: DISCONTINUED | OUTPATIENT
Start: 2018-01-04 | End: 2018-01-03 | Stop reason: HOSPADM

## 2018-01-02 RX ORDER — ALBUMIN HUMAN 250 G/1000ML
25 SOLUTION INTRAVENOUS CONTINUOUS PRN
Status: ACTIVE | OUTPATIENT
Start: 2018-01-02 | End: 2018-01-03

## 2018-01-02 RX ORDER — CLOPIDOGREL BISULFATE 75 MG/1
75 TABLET ORAL ONCE
Status: DISCONTINUED | OUTPATIENT
Start: 2018-01-02 | End: 2018-01-03 | Stop reason: HOSPADM

## 2018-01-02 RX ORDER — MIDODRINE HYDROCHLORIDE 5 MG/1
15 TABLET ORAL
Status: DISCONTINUED | OUTPATIENT
Start: 2018-01-04 | End: 2018-01-03 | Stop reason: HOSPADM

## 2018-01-02 RX ORDER — SODIUM CHLORIDE 9 MG/ML
INJECTION, SOLUTION INTRAVENOUS ONCE
Status: COMPLETED | OUTPATIENT
Start: 2018-01-02 | End: 2018-01-02

## 2018-01-02 RX ORDER — AMOXICILLIN 250 MG
1 CAPSULE ORAL 2 TIMES DAILY PRN
Status: DISCONTINUED | OUTPATIENT
Start: 2018-01-02 | End: 2018-01-03 | Stop reason: HOSPADM

## 2018-01-02 RX ORDER — MIDODRINE HYDROCHLORIDE 5 MG/1
10 TABLET ORAL
Status: DISCONTINUED | OUTPATIENT
Start: 2018-01-03 | End: 2018-01-03 | Stop reason: HOSPADM

## 2018-01-02 RX ORDER — IBUPROFEN 200 MG
24 TABLET ORAL
Status: DISCONTINUED | OUTPATIENT
Start: 2018-01-02 | End: 2018-01-03 | Stop reason: HOSPADM

## 2018-01-02 RX ORDER — GLUCAGON 1 MG
1 KIT INJECTION
Status: DISCONTINUED | OUTPATIENT
Start: 2018-01-02 | End: 2018-01-03 | Stop reason: HOSPADM

## 2018-01-02 RX ORDER — SODIUM CHLORIDE 9 MG/ML
INJECTION, SOLUTION INTRAVENOUS ONCE
Status: DISCONTINUED | OUTPATIENT
Start: 2018-01-02 | End: 2018-01-03 | Stop reason: HOSPADM

## 2018-01-02 RX ORDER — RAMELTEON 8 MG/1
8 TABLET ORAL NIGHTLY PRN
Status: DISCONTINUED | OUTPATIENT
Start: 2018-01-02 | End: 2018-01-03 | Stop reason: HOSPADM

## 2018-01-02 RX ORDER — PREDNISONE 10 MG/1
10 TABLET ORAL DAILY
Status: DISCONTINUED | OUTPATIENT
Start: 2018-01-02 | End: 2018-01-03 | Stop reason: HOSPADM

## 2018-01-02 RX ORDER — MIDODRINE HYDROCHLORIDE 5 MG/1
15 TABLET ORAL
Status: DISCONTINUED | OUTPATIENT
Start: 2018-01-02 | End: 2018-01-02

## 2018-01-02 RX ORDER — FLUDROCORTISONE ACETATE 0.1 MG/1
100 TABLET ORAL 2 TIMES DAILY
Status: DISCONTINUED | OUTPATIENT
Start: 2018-01-02 | End: 2018-01-03 | Stop reason: HOSPADM

## 2018-01-02 RX ORDER — MIDODRINE HYDROCHLORIDE 5 MG/1
15 TABLET ORAL
Status: DISPENSED | OUTPATIENT
Start: 2018-01-02 | End: 2018-01-02

## 2018-01-02 RX ORDER — ATORVASTATIN CALCIUM 20 MG/1
80 TABLET, FILM COATED ORAL DAILY
Status: DISCONTINUED | OUTPATIENT
Start: 2018-01-02 | End: 2018-01-03 | Stop reason: HOSPADM

## 2018-01-02 RX ORDER — INSULIN ASPART 100 [IU]/ML
0-5 INJECTION, SOLUTION INTRAVENOUS; SUBCUTANEOUS
Status: DISCONTINUED | OUTPATIENT
Start: 2018-01-02 | End: 2018-01-03

## 2018-01-02 RX ADMIN — FLUDROCORTISONE ACETATE 100 MCG: 0.1 TABLET ORAL at 09:01

## 2018-01-02 RX ADMIN — SEVELAMER CARBONATE 800 MG: 800 TABLET, FILM COATED ORAL at 06:01

## 2018-01-02 RX ADMIN — LEVETIRACETAM 500 MG: 500 TABLET ORAL at 09:01

## 2018-01-02 RX ADMIN — PREDNISOLONE ACETATE 1 DROP: 10 SUSPENSION/ DROPS OPHTHALMIC at 09:01

## 2018-01-02 RX ADMIN — MIDODRINE HYDROCHLORIDE 15 MG: 5 TABLET ORAL at 03:01

## 2018-01-02 RX ADMIN — IPRATROPIUM BROMIDE AND ALBUTEROL SULFATE 3 ML: .5; 3 SOLUTION RESPIRATORY (INHALATION) at 07:01

## 2018-01-02 RX ADMIN — NORTRIPTYLINE HYDROCHLORIDE 25 MG: 25 CAPSULE ORAL at 09:01

## 2018-01-02 RX ADMIN — FERUMOXYTOL 510 MG: 510 INJECTION INTRAVENOUS at 05:01

## 2018-01-02 RX ADMIN — IPRATROPIUM BROMIDE AND ALBUTEROL SULFATE 3 ML: .5; 3 SOLUTION RESPIRATORY (INHALATION) at 02:01

## 2018-01-02 RX ADMIN — SODIUM CHLORIDE: 0.9 INJECTION, SOLUTION INTRAVENOUS at 03:01

## 2018-01-02 NOTE — ASSESSMENT & PLAN NOTE
- Hg 13.1 on [employer]  - no need for JINA    -recent Fe studies with Ferritin > 600 and TSAT 26 will resume Fe as out patinet

## 2018-01-02 NOTE — ED PROVIDER NOTES
Encounter Date: 1/2/2018    SCRIBE #1 NOTE: I, Paul Kamara, am scribing for, and in the presence of,  Dr. Michelle. I have scribed the entire note.       History     Chief Complaint   Patient presents with    Shortness of Breath     dialysis pt, last time she went was on Saturday, pt went to dialysis today and became SOB so was sent here, pt currently on 15L nonrebreather sating at 100%, pt was sating at 76% on room air     56 y/o -American female with a Hx: ESRD on HD (T/Th/sat), CHF,Thyroid disease  presents to the ED complaining of SOB which has been ongoing since yesterday. Pt reports that she felt SOB prior to dialysis and was unable to recieve dialysis because of her SOB. Pt states that her SOB has worsened and she's been couging up green muscous. She denies any fever, n/v and chest pain. Patient confirms the use of O2 at home.       The history is provided by the patient and medical records.     Review of patient's allergies indicates:   Allergen Reactions    Bactrim [sulfamethoxazole-trimethoprim] Other (See Comments)     Causes renal failure    Nsaids (non-steroidal anti-inflammatory drug) Other (See Comments)     Renal failure     Past Medical History:   Diagnosis Date    Anemia in ESRD (end-stage renal disease) 3/7/2016    Anticoagulant long-term use     Cervical radiculopathy 7/20/2015    CHF (congestive heart failure)     Chronic combined systolic and diastolic heart failure 6/14/2013    EF 20% 2013, improved with Medical therapy, negative PET 2013    Chronic respiratory failure with hypoxia 04/22/2013    On home oxygen at 2-5 liters    Closed fracture of proximal end of right fibula 6/27/2016    Complications due to renal dialysis device, implant, and graft     DDD (degenerative disc disease), lumbar 6/17/2015    Dependence on renal dialysis     Diabetes mellitus type II, controlled 12/8/2017    ESRD on hemodialysis 2/23/2016    Essential hypertension     Gout     Lateral  meniscal tear 2016    Lumbar stenosis 2015    Pacemaker     Paroxysmal atrial fibrillation 2014    Not on anticoagulation    Peripheral neuropathy 2013    Personal history of gastric ulcer 3/19/2013    Sarcoidosis, lung 2009    Diagnosed in  with ocular manifestation, on 4L home O2 and PO steroids    Secondary pulmonary hypertension 2015    Seizure disorder 3/19/2013    Seizures     Shingles 2013    Thyroid disease      Past Surgical History:   Procedure Laterality Date    ABDOMINAL SURGERY      CARDIAC PACEMAKER PLACEMENT       SECTION      x2    OTHER SURGICAL HISTORY      loop recorder implant    stent to fistula      VASCULAR SURGERY      fistula to L upper arm      Family History   Problem Relation Age of Onset    Kidney failure Mother     Hypertension Mother     Diabetes Mother     Coronary artery disease Father     Hypertension Father     Diabetes Father     Lupus Sister     Diabetes Maternal Grandmother     Colon cancer Neg Hx     Ovarian cancer Neg Hx     Breast cancer Neg Hx      Social History   Substance Use Topics    Smoking status: Former Smoker     Packs/day: 0.50     Years: 10.00     Types: Cigarettes     Quit date: 1994    Smokeless tobacco: Never Used    Alcohol use No      Comment: rarely     Review of Systems   Constitutional: Negative for fever.   HENT: Negative for sore throat.    Respiratory: Positive for cough (productive cough with green sputum) and shortness of breath.    Cardiovascular: Negative for chest pain.   Gastrointestinal: Negative for nausea and vomiting.   Genitourinary: Negative for dysuria.   Musculoskeletal: Negative for back pain.   Skin: Negative for rash.   Neurological: Negative for weakness.   Hematological: Does not bruise/bleed easily.       Physical Exam     Initial Vitals [18 0654]   BP Pulse Resp Temp SpO2   (!) 151/86 68 18 98.6 °F (37 °C) 100 %      MAP       107.67          Physical Exam    Nursing note and vitals reviewed.  Constitutional: She appears well-developed. She appears distressed (respiratory).   HENT:   Mouth/Throat: Oropharynx is clear and moist.   Eyes: Conjunctivae and EOM are normal. Pupils are equal, round, and reactive to light.   Neck: Normal range of motion. Neck supple. JVD present.   Cardiovascular: Normal rate, regular rhythm and intact distal pulses.   Pulmonary/Chest: She is in respiratory distress (Mild tachypnea). She has rhonchi (Bilateral).   Abdominal: Soft. Bowel sounds are normal. There is no tenderness.   Musculoskeletal: Normal range of motion. She exhibits edema (trace bilateral). She exhibits no tenderness.   (+) thrill of AV fistula left UE   Lymphadenopathy:     She has no cervical adenopathy.   Neurological: She is alert and oriented to person, place, and time. She has normal strength.   Skin: Skin is warm. Capillary refill takes less than 2 seconds.         ED Course   Procedures  Labs Reviewed   B-TYPE NATRIURETIC PEPTIDE - Abnormal; Notable for the following:        Result Value     (*)     All other components within normal limits   CBC W/ AUTO DIFFERENTIAL - Abnormal; Notable for the following:      (*)     MCHC 29.3 (*)     RDW 14.6 (*)     Lymph # 0.7 (*)     Mono # 1.1 (*)     Gran% 78.0 (*)     Lymph% 8.4 (*)     All other components within normal limits   COMPREHENSIVE METABOLIC PANEL - Abnormal; Notable for the following:     Potassium 5.5 (*)     CO2 16 (*)     Glucose 214 (*)     BUN, Bld 54 (*)     Creatinine 9.7 (*)     Albumin 3.0 (*)     Alkaline Phosphatase 172 (*)     AST 48 (*)     Anion Gap 18 (*)     eGFR if  4.7 (*)     eGFR if non  4.0 (*)     All other components within normal limits   PROTIME-INR - Abnormal; Notable for the following:     Prothrombin Time 22.7 (*)     INR 2.3 (*)     All other components within normal limits   TROPONIN I - Abnormal; Notable for the  following:     Troponin I 0.162 (*)     All other components within normal limits   CULTURE, BLOOD   CULTURE, BLOOD     EKG Readings: (Independently Interpreted)   EKG: Atrial flutter at a rate of 70       X-Rays:   Independently Interpreted Readings:   Other Readings:  Chest x-ray: Cardiomegaly, bilateral pulmonary edema    Medical Decision Making:   History:   Old Medical Records: I decided to obtain old medical records.  Initial Assessment:   Emergent evaluation 57-year-old female presenting with acute shortness of breath while at dialysis.  Differential Diagnosis:   Hypervolemia, acute pneumonia, acute bronchitis, hypertensive urgency, and electrolyte arrangement  Clinical Tests:   Lab Tests: Reviewed and Ordered  Radiological Study: Ordered and Reviewed  Medical Tests: Ordered and Reviewed  ED Management:  Labs reviewed significant for chronic kidney disease, hyperkalemia.  There is no evidence of leukocytosis.  X-ray reviewed which shows bilateral pulmonary edema with cardiomegaly.  Patient will require emergent dialysis secondary to increased oxygen demand.  Case discussed with Hospital medicine.  Patient will be placed in obs for further treatment and evaluation  Other:   I have discussed this case with another health care provider.       <> Summary of the Discussion: Nephrology  Hospital medicine                Scribe Attestation:   Scribe #1: I performed the above scribed service and the documentation accurately describes the services I performed. I attest to the accuracy of the note.  Comments: I, Dr. Kandi Michelle, personally performed the services described in this documentation. All medical record entries made by the scribe were at my direction and in my presence.  I have reviewed the chart and agree that the record reflects my personal performance and is accurate and complete. Kandi Michelle MD.  8:33 AM 01/02/2018              ED Course      Clinical Impression:   The primary encounter diagnosis was  Hyperkalemia. Diagnoses of Dyspnea and Acute pulmonary edema were also pertinent to this visit.    Disposition:   Disposition: Placed in Observation  Condition: Yashira Michelle MD  01/02/18 0802

## 2018-01-02 NOTE — ASSESSMENT & PLAN NOTE
TTS schedule, last session Saturday 12/30/2017.  -Did not receive HD session today  -Nephro consulted for HD  -Sensipar 30  -Renvela 800 with evening meal (Home regimen.)  -Midodrine 10mg MWF and Sun, 15mg TThSat for HD associated hypotension.

## 2018-01-02 NOTE — ASSESSMENT & PLAN NOTE
-Diagnosed 6 years ago per patient and she has required 5L home oxygen.  -Continue prednisone 10mg QD  -Fludrocortisone 0.1mg QD  -Breathing treatment q6 awake.

## 2018-01-02 NOTE — H&P
[facility] Medicine  History & Physical    Patient Name: Sisi Medel  MRN: [id number]  Admission Date: 1/2/2018  Attending Physician: Kandi Michelle MD   Primary Care Provider: Carlos A Caputo MD    [facility] Medicine Team: [facility] 2 Elpidio Byrd MD     Patient information was obtained from patient, past medical records and ER records.     Subjective:     Principal Problem:Acute on chronic respiratory failure with hypoxia    Chief Complaint:   Chief Complaint   Patient presents with    Shortness of Breath     dialysis pt, last time she went was on Saturday, pt went to dialysis today and became SOB so was sent here, pt currently on 15L nonrebreather sating at 100%, pt was sating at 76% on room air        HPI: Sisi Medel is a pleasant 57 year old female with history of chronic respiratory failure 2/2 pulmonary sarcoidosis, CHF s/p pacemaker placement 2016 (Last echo 12/09 EF 30-35%), ESRD on HD(TTS), permanent A-fib on coumadin, seizure disorder who presents with SOB. Patient complains of SOB that began yesterday evening associated with a cough productive of white sputum, and increased lower extremity edema. Patient states these symptoms progressed after awakening this morning. She drove to her dialysis center this morning for her scheduled HD, and she was noted to be tachypnic with an SpO2 of 76% on RA. She was asked to go to the ED where she was placed on 15L high flow NC and began satting well. Patient endorses a recent history of increased thirst and fluid intake for past week. She denies orthopnea, chest pain, palpitations, blurry vision, Fever, rhinorhea, diarrhea, abdominal pain, sick contacts, and denies any history of diabetes.   Patient has required 5L of home oxygen for past 6 years 2/2 to pulmonary sarcoidosis, for which she has been on a stable dose of 10mg QD. Patient is a former smoker, having quit 25 years ago with only a 2p.y. smoking history.  Patient states she watches how much salt intake she receives in her diet, but otherwise does not restrict her diet. She is compliant with all of her medications.        Past Medical History:   Diagnosis Date    Anemia in ESRD (end-stage renal disease) 3/7/2016    Anticoagulant long-term use     Cervical radiculopathy 2015    CHF (congestive heart failure)     Chronic combined systolic and diastolic heart failure 2013    EF 20% , improved with Medical therapy, negative PET     Chronic respiratory failure with hypoxia 2013    On home oxygen at 2-5 liters    Closed fracture of proximal end of right fibula 2016    Complications due to renal dialysis device, implant, and graft     DDD (degenerative disc disease), lumbar 2015    Dependence on renal dialysis     Diabetes mellitus type II, controlled 2017    ESRD on hemodialysis 2016    Essential hypertension     Gout     Lateral meniscal tear 2016    Lumbar stenosis 2015    Pacemaker     Paroxysmal atrial fibrillation 2014    Not on anticoagulation    Peripheral neuropathy 2013    Personal history of gastric ulcer 3/19/2013    Sarcoidosis, lung 2009    Diagnosed in  with ocular manifestation, on 4L home O2 and PO steroids    Secondary pulmonary hypertension 2015    Seizure disorder 3/19/2013    Seizures     Shingles 2013    Thyroid disease        Past Surgical History:   Procedure Laterality Date    ABDOMINAL SURGERY      CARDIAC PACEMAKER PLACEMENT       SECTION      x2    OTHER SURGICAL HISTORY      loop recorder implant    stent to fistula      VASCULAR SURGERY      fistula to L upper arm        Review of patient's allergies indicates:   Allergen Reactions    Bactrim [sulfamethoxazole-trimethoprim] Other (See Comments)     Causes renal failure    Nsaids (non-steroidal anti-inflammatory drug) Other (See Comments)     Renal failure       No current  facility-administered medications on file prior to encounter.      Current Outpatient Prescriptions on File Prior to Encounter   Medication Sig    atorvastatin (LIPITOR) 80 MG tablet TAKE 1 TABLET(80 MG) BY MOUTH EVERY NIGHT    cholecalciferol, vitamin D3, 1,000 unit capsule Take 1 capsule (1,000 Units total) by mouth once daily. (Patient taking differently: Take 1,000 Units by mouth every evening. )    clopidogrel (PLAVIX) 75 mg tablet 75 mg.    digoxin (LANOXIN) 125 mcg tablet Take 1 tablet (0.125 mg total) by mouth every other day.    fludrocortisone (FLORINEF) 0.1 mg Tab Take 100 mcg by mouth 2 (two) times daily.    gabapentin (NEURONTIN) 300 MG capsule TK ONE C PO  TID    levETIRAcetam (KEPPRA) 500 MG Tab Take 1 tablet (500 mg total) by mouth 2 (two) times daily.    midodrine (PROAMATINE) 5 MG Tab Take 2 tablets (10 mg total) by mouth every Mon, Wed, Fri, Sun.    nortriptyline (PAMELOR) 25 MG capsule Take 1 capsule (25 mg total) by mouth every evening.    omeprazole (PRILOSEC) 20 MG capsule TAKE 1 CAPSULE(20 MG) BY MOUTH EVERY DAY    polyethylene glycol (GLYCOLAX) 17 gram/dose powder Take 17 g by mouth once daily. Dissolve in juice.    prednisoLONE acetate (PRED FORTE) 1 % DrpS INSTILL ONE DROP INTO  LEFT EYE THREE TIMES A DAY    predniSONE (DELTASONE) 10 MG tablet Take 1 tablet (10 mg total) by mouth once daily.    predniSONE (DELTASONE) 10 MG tablet TAKE 1 TABLET BY MOUTH EVERY DAY WITH BREAKFAST    RENVELA 800 mg Tab TAKE 1 TABLET BY MOUTH THREE TIMES DAILY WITH MEALS    SENSIPAR 30 mg Tab     tizanidine 4 mg Cap TAKE 1 CAPSULE BY MOUTH DAILY AS NEEDED FOR MUSCLE SPASM    warfarin (COUMADIN) 5 MG tablet Take 1 tablet (5 mg total) by mouth Daily. [facility] (Patient taking differently: Take 5 mg by mouth every evening. Per [facility])    ACZONE 5 % topical gel Apply to face every morning    ammonium lactate (LAC-HYDRIN) 12 % lotion Apply topically as needed (to legs).      diclofenac sodium (SOLARAZE) 3 % gel     doxepin (ZONALON) 5 % cream     econazole nitrate 1 % cream APPLY TOPICALLY TWICE DAILY AS NEEDED FOR FUNGAL SKIN INFECTIONS. USE FOR 2 TO 4 WEEKS    ketorolac 0.5% (ACULAR) 0.5 % Drop instill one drop into both eyes every morning     Family History     Problem Relation (Age of Onset)    Coronary artery disease Father    Diabetes Mother, Father, Maternal Grandmother    Hypertension Mother, Father    Kidney failure Mother    Lupus Sister        Social History Main Topics    Smoking status: Former Smoker     Packs/day: 0.50     Years: 10.00     Types: Cigarettes     Quit date: 4/22/1994    Smokeless tobacco: Never Used    Alcohol use No      Comment: rarely    Drug use: No    Sexual activity: Not Currently     Review of Systems   Constitutional: Positive for chills. Negative for appetite change, fatigue and fever.   HENT: Positive for sore throat (2/2 frequent cough). Negative for postnasal drip, rhinorrhea, sinus pain, sinus pressure, sneezing and trouble swallowing.    Eyes: Positive for redness (chronic 2/2 sarcoid). Negative for visual disturbance.   Respiratory: Positive for cough (productive of white sputum) and shortness of breath. Negative for wheezing.    Cardiovascular: Positive for leg swelling. Negative for chest pain and palpitations.   Gastrointestinal: Negative for abdominal distention, abdominal pain, constipation, diarrhea, nausea and vomiting.   Endocrine: Positive for polydipsia.   Musculoskeletal: Negative for arthralgias, myalgias, neck pain and neck stiffness.   Allergic/Immunologic: Positive for immunocompromised state (chronic prednisone use).   Neurological: Positive for numbness (chronic, bilateral feet). Negative for dizziness, tremors, seizures, syncope, facial asymmetry, speech difficulty, weakness, light-headedness and headaches.   Psychiatric/Behavioral: Negative for confusion.     Objective:     Vital Signs (Most Recent):  Temp: 98.6 °F  (37 °C) (01/02/18 0654)  Pulse: 69 (01/02/18 1100)  Resp: 20 (01/02/18 1045)  BP: 121/77 (01/02/18 1100)  SpO2: 99 % (01/02/18 1100) Vital Signs (24h Range):  Temp:  [98.6 °F (37 °C)] 98.6 °F (37 °C)  Pulse:  [68-69] 69  Resp:  [18-24] 20  SpO2:  [99 %-100 %] 99 %  BP: (117-151)/(71-89) 121/77     Weight: 129.3 kg (285 lb)  Body mass index is 42.09 kg/m².    Physical Exam   Constitutional: She is oriented to person, place, and time. She appears well-developed and well-nourished. No distress.   HENT:   Head: Normocephalic and atraumatic.   Dry mucus membranes   Eyes: EOM are normal. Pupils are equal, round, and reactive to light.   Injected sclerae bilaterally   Neck: Normal range of motion. No JVD present.   Cardiovascular: Normal rate, regular rhythm, normal heart sounds and intact distal pulses.    No murmur heard.  LUE fistula with thrill   Pulmonary/Chest: Effort normal. No respiratory distress. She has no wheezes.   Rales bilaterally worse in the lower lobe fields. No absent breath sounds.    Abdominal: Soft. Bowel sounds are normal. She exhibits no distension. There is no tenderness.   Musculoskeletal: Normal range of motion. She exhibits edema (bilateral lower extremities to knees). She exhibits no tenderness.   Lymphadenopathy:     She has no cervical adenopathy.   Neurological: She is alert and oriented to person, place, and time. No cranial nerve deficit.   5/5 strength bilaterally.  Neuropathy as stated in bilateral lower extremities in feet.    Skin: She is not diaphoretic.   Vitals reviewed.        CRANIAL NERVES     CN III, IV, VI   Pupils are equal, round, and reactive to light.  Extraocular motions are normal.        Significant Labs:   Recent Results (from the past 24 hour(s))   Brain natriuretic peptide    Collection Time: 01/02/18  7:18 AM   Result Value Ref Range     (H) 0 - 99 pg/mL   CBC auto differential    Collection Time: 01/02/18  7:18 AM   Result Value Ref Range    WBC 8.77 3.90 -  12.70 K/uL    RBC 4.63 4.00 - 5.40 M/uL    Hemoglobin 13.6 12.0 - 16.0 g/dL    Hematocrit 46.4 37.0 - 48.5 %     (H) 82 - 98 fL    MCH 29.4 27.0 - 31.0 pg    MCHC 29.3 (L) 32.0 - 36.0 g/dL    RDW 14.6 (H) 11.5 - 14.5 %    Platelets 196 150 - 350 K/uL    MPV 10.7 9.2 - 12.9 fL    Immature Granulocytes 0.5 0.0 - 0.5 %    Gran # 6.8 1.8 - 7.7 K/uL    Immature Grans (Abs) 0.04 0.00 - 0.04 K/uL    Lymph # 0.7 (L) 1.0 - 4.8 K/uL    Mono # 1.1 (H) 0.3 - 1.0 K/uL    Eos # 0.1 0.0 - 0.5 K/uL    Baso # 0.02 0.00 - 0.20 K/uL    nRBC 0 0 /100 WBC    Gran% 78.0 (H) 38.0 - 73.0 %    Lymph% 8.4 (L) 18.0 - 48.0 %    Mono% 12.1 4.0 - 15.0 %    Eosinophil% 0.8 0.0 - 8.0 %    Basophil% 0.2 0.0 - 1.9 %    Differential Method Automated    Comprehensive metabolic panel    Collection Time: 01/02/18  7:18 AM   Result Value Ref Range    Sodium 137 136 - 145 mmol/L    Potassium 5.5 (H) 3.5 - 5.1 mmol/L    Chloride 103 95 - 110 mmol/L    CO2 16 (L) 23 - 29 mmol/L    Glucose 214 (H) 70 - 110 mg/dL    BUN, Bld 54 (H) 6 - 20 mg/dL    Creatinine 9.7 (H) 0.5 - 1.4 mg/dL    Calcium 9.0 8.7 - 10.5 mg/dL    Total Protein 7.0 6.0 - 8.4 g/dL    Albumin 3.0 (L) 3.5 - 5.2 g/dL    Total Bilirubin 0.5 0.1 - 1.0 mg/dL    Alkaline Phosphatase 172 (H) 55 - 135 U/L    AST 48 (H) 10 - 40 U/L    ALT 40 10 - 44 U/L    Anion Gap 18 (H) 8 - 16 mmol/L    eGFR if African American 4.7 (A) >60 mL/min/1.73 m^2    eGFR if non  4.0 (A) >60 mL/min/1.73 m^2   Troponin I    Collection Time: 01/02/18  7:18 AM   Result Value Ref Range    Troponin I 0.162 (H) 0.000 - 0.026 ng/mL   Protime-INR    Collection Time: 01/02/18  7:53 AM   Result Value Ref Range    Prothrombin Time 22.7 (H) 9.0 - 12.5 sec    INR 2.3 (H) 0.8 - 1.2         Significant Imaging:   CXR 1/2/2018:  There is a pacer, cardiomegaly, moderate to severe edema, aortic plaque, and no change.    Assessment/Plan:     * Acute on chronic respiratory failure with hypoxia    -Patient initially with  increased oxygen requirements, placed on 15L high flow NC in the ED.  -Patient currently satting well on her home oxygen requirement of 5L NC.  -On exam patient appears well, in NAD.  -Likely 2/2 to volume overload. Evidence of pulmonary edema on CXR in patient with CHF and ESRD. Patient states she had increased PO intake of fluids 2/2 increased thirst. She was hyperglycemic in ED, likely 2/2 to long term prednisone use.  -Low suspicion for infectious process given clinical history and imaging. But blood cultures drawn and f/u pro-sam.  -Will not initiate Abx at this time. No leukocytosis and afebrile.   -No signs of ischemic changes on EKG, Afib rate controlled.   -Nephrology consulted and patient will be dialyzed today.   -Continue to monitor oxygen requirements, but patient appears to be at baseline.   -Cardiac/diabetic diet, 1500cc fluid restriction.             Hyperglycemia    -A1c 6.5 on last admission earlier in December.  -patient denies any history of diabetes. Does not take any insulin at home  -Very likely the source of polydipsia that has contributed to current hypervolemia.   -most likely glucose intolerance from long term steroid use versus sarcoid  -Diabetic diet, LDSSI.           Permanent atrial fibrillation    -Digoxin 125mch every other day  -Coumadin 5mg QD for anticoagulation  -Currently rate controlled          Chronic combined systolic and diastolic heart failure    -Last ECHO 12/08 with EF 30-35%  -Digoxin 125mcg every other day  -s/p dual chamber pacemaker placed 2016.           ESRD on hemodialysis    TTS schedule, last session Saturday 12/30/2017.  -Did not receive HD session today  -Nephro consulted for HD  -Sensipar 30  -Renvela 800 with evening meal (Home regimen.)  -Midodrine 10mg MWF and Sun, 15mg TThSat for HD associated hypotension.            Chronic respiratory failure    -See acute respiratory failure.  -On home oxygen 5L for past 6 years.        Seizure disorder    Patient with  history of epilepsy, follows up in neurology clinic, last appointment 9/2017.  -Continue keppra 500 BID    Symptoms of painful polyneuropathy noted on EMG 2014  -Nortriptyline 25 QHS          Pulmonary sarcoidosis    -Diagnosed 6 years ago per patient and she has required 5L home oxygen.  -Continue prednisone 10mg QD  -Fludrocortisone 0.1mg QD  -Breathing treatment q6 awake.          Hyperkalemia    -Monitor BMP  -HD today          Episode of transient neurologic symptoms    -Recent admission in December for TIA, symptoms since resolved.  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          Hemodialysis-associated hypotension    -See ESRD with HD          GERD (gastroesophageal reflux disease)    -protonix 40mg QD        Immunosuppressed status    2/2 to daily prednisone use          Current use of long term anticoagulation    -Daily INR  -Coumadin 5mg QD          Anemia in ESRD (end-stage renal disease)               Hyperlipidemia    -Atorvastatin 80          Morbid obesity with BMI of 40.0-44.9, adult    -PT/OT            VTE Risk Mitigation         Ordered     warfarin (COUMADIN) tablet 5 mg  Daily     Route:  Oral        01/02/18 1146     Medium Risk of VTE  Once      01/02/18 1146     Place sequential compression device  Until discontinued      01/02/18 1146             Elpidio Byrd MD  Department of Hospital Medicine   [facility]

## 2018-01-02 NOTE — ASSESSMENT & PLAN NOTE
Patient with history of epilepsy, follows up in neurology clinic, last appointment 9/2017.  -Continue keppra 500 BID    Symptoms of painful polyneuropathy noted on EMG 2014  -Nortriptyline 25 QHS

## 2018-01-02 NOTE — ED TRIAGE NOTES
Pt arrived to ED via EMS with CC of SOB. EMS reports pt was about to begin dialysis when pt became SOB with RA SpO2 74%. Pt arrived on 15L O2 via nonrebreather with SpO2 maintained at 100%.    EMS reports pt with hx of sarcoidosis.    Pt c/o SOB cough with green sputum, fever and chills. Denies CP.    Patient identifiers verified and correct for Sisi Medel.    LOC: The patient is awake, alert and oriented x 4. Pt is speaking appropriately, no slurred speech.  APPEARANCE: Patient resting and in no acute distress. Pt is clean and well groomed. No JVD visible. Pt reports pain level of 0.  SKIN: Skin is warm dry and intact, and color is consistent with ethnicity. No tenting observed and capillary refill <3 seconds. No clubbing noted to nail beds. No breakdown or brusing visible and mucus membranes moist and acyanotic.  MUSCULOSKELETAL: Full range of motion present in all extremities. Hand  equal and leg strength strong +5 bilaterally.  RESPIRATORY: Airway is open and patent. Respirations-labored with increased RR of  equal bilaterally on inspiration and expiration. No accessory muscle use noted. Coarse BS noted bilaterally.  CARDIAC: Patient has regular heart rate and rhythm. +1 pitting edema noted to bilateral lower extremities.  ABDOMEN: Soft and non-tender to palpation with no distention noted. Pt c/o constipation with LBM approx x2 days ago.  NEUROLOGIC: Eyes open spontaneously and facial expression symmetrical. Pt behavior appropriate to situation, and pt follows commands.  Pt reports sensation present in all extremities when touched with a finger. PERRLA

## 2018-01-02 NOTE — CONSULTS
[facility]  Nephrology  Consult Note    Patient Name: Sisi Medel  MRN: [id number]  Admission Date: 1/2/2018  Hospital Length of Stay: 0 days  Attending Provider: Kandi Michelle MD   Primary Care Physician: Carlos A Caputo MD  Principal Problem:Acute on chronic respiratory failure with hypoxia    Inpatient consult to Nephrology  Consult performed by: MONSE ROE  Consult ordered by: MARCO RDZ  Reason for consult: ESRD        Subjective:     HPI: Ms Sisi Medel is a pleasant 56 y/o Afr[place] afemale with history of chronic respiratory failure 2/2 pulmonary sarcoidosis, HFrEF s/p pacemaker placement 2016 (Last echo 12/09 EF 30-35%), ESRD on HD TTS, permanent A-fib on coumadin, seizure disorder who presents with SOB. SOB began yesterday evening associated with a cough productive of white sputum, and increased lower extremity edema. Admitted to Hospital Medicine secondary to tachycardia and SOB. Her symptoms started this am and slowly have improved. She drove her self to her dialysis center as usual for her routine HD. Upon arival, she was noted to be tachypnic and hypoxic with an SpO2 of 76% on RA. She was placed on a NRB mask at  He HD unita db was transferred to ED. She was asked to go to the ED where she was placed on 15L high flow NC and began satting well. Patient endorses a recent history of increased thirst and fluid intake for past week. She denies orthopnea, chest pain, palpitations, blurry vision, Fever, rhinorhea, diarrhea, abdominal pain, sick contacts, and denies any history of diabetes.   Patient has required 5L of home oxygen for past 6 years 2/2 to pulmonary sarcoidosis, for which she has been on a stable dose of 10mg QD. Patient is a former smoker, having quit 25 years ago with only a 2p.y. smoking history. Patient states she watches how much salt intake she receives in her diet, but otherwise does not restrict her diet. She is compliant with all of her  medications.    She dialyses at [facility] via [facility] under the care of for 5 hrs with  Kg. Last HD Saturday.         Past Medical History:   Diagnosis Date    Anemia in ESRD (end-stage renal disease) 3/7/2016    Anticoagulant long-term use     Cervical radiculopathy 2015    CHF (congestive heart failure)     Chronic combined systolic and diastolic heart failure 2013    EF 20% , improved with Medical therapy, negative PET     Chronic respiratory failure with hypoxia 2013    On home oxygen at 2-5 liters    Closed fracture of proximal end of right fibula 2016    Complications due to renal dialysis device, implant, and graft     DDD (degenerative disc disease), lumbar 2015    Dependence on renal dialysis     Diabetes mellitus type II, controlled 2017    ESRD on hemodialysis 2016    Essential hypertension     Gout     Lateral meniscal tear 2016    Lumbar stenosis 2015    Pacemaker     Paroxysmal atrial fibrillation 2014    Not on anticoagulation    Peripheral neuropathy 2013    Personal history of gastric ulcer 3/19/2013    Sarcoidosis, lung 2009    Diagnosed in  with ocular manifestation, on 4L home O2 and PO steroids    Secondary pulmonary hypertension 2015    Seizure disorder 3/19/2013    Seizures     Shingles 2013    Thyroid disease        Past Surgical History:   Procedure Laterality Date    ABDOMINAL SURGERY      CARDIAC PACEMAKER PLACEMENT       SECTION      x2    OTHER SURGICAL HISTORY      loop recorder implant    stent to fistula      VASCULAR SURGERY      fistula to L upper arm        Review of patient's allergies indicates:   Allergen Reactions    Bactrim [sulfamethoxazole-trimethoprim] Other (See Comments)     Causes renal failure    Nsaids (non-steroidal anti-inflammatory drug) Other (See Comments)     Renal failure     Current Facility-Administered Medications   Medication  Frequency    0.9%  NaCl infusion PRN    0.9%  NaCl infusion Once    0.9%  NaCl infusion PRN    0.9%  NaCl infusion Once    acetaminophen tablet 650 mg Q4H PRN    albuterol-ipratropium 2.5mg-0.5mg/3mL nebulizer solution 3 mL Q6H WAKE    atorvastatin tablet 80 mg Daily    [START ON 1/3/2018] cinacalcet tablet 30 mg Daily with breakfast    clopidogrel tablet 75 mg Once    dextrose 50% injection 12.5 g PRN    dextrose 50% injection 25 g PRN    [START ON 1/4/2018] digoxin tablet 0.125 mg Every other day    fludrocortisone tablet 100 mcg BID    glucagon (human recombinant) injection 1 mg PRN    glucose chewable tablet 16 g PRN    glucose chewable tablet 24 g PRN    insulin aspart pen 0-5 Units QID (AC + HS) PRN    levETIRAcetam tablet 500 mg BID    [START ON 1/3/2018] midodrine tablet 10 mg Every Mon, Wed, Fri, Sun    [START ON 1/4/2018] midodrine tablet 15 mg Every Tues, Thurs, Sat    midodrine tablet 15 mg PRN    nortriptyline capsule 25 mg QHS    ondansetron disintegrating tablet 8 mg Q8H PRN    [START ON 1/3/2018] pantoprazole EC tablet 40 mg Daily    polyethylene glycol packet 17 g Daily    prednisoLONE acetate 1 % ophthalmic suspension 1 drop BID    predniSONE tablet 10 mg Daily    ramelteon tablet 8 mg Nightly PRN    senna-docusate 8.6-50 mg per tablet 1 tablet BID PRN    sevelamer carbonate tablet 800 mg Daily with dinner    sodium chloride 0.9% flush 5 mL PRN    tiZANidine tablet 2 mg Daily PRN    [START ON 1/3/2018] warfarin (COUMADIN) tablet 5 mg Daily     Current Outpatient Prescriptions   Medication    atorvastatin (LIPITOR) 80 MG tablet    cholecalciferol, vitamin D3, 1,000 unit capsule    clopidogrel (PLAVIX) 75 mg tablet    digoxin (LANOXIN) 125 mcg tablet    fludrocortisone (FLORINEF) 0.1 mg Tab    gabapentin (NEURONTIN) 300 MG capsule    levETIRAcetam (KEPPRA) 500 MG Tab    midodrine (PROAMATINE) 5 MG Tab    nortriptyline (PAMELOR) 25 MG capsule    omeprazole  (PRILOSEC) 20 MG capsule    polyethylene glycol (GLYCOLAX) 17 gram/dose powder    prednisoLONE acetate (PRED FORTE) 1 % DrpS    predniSONE (DELTASONE) 10 MG tablet    predniSONE (DELTASONE) 10 MG tablet    RENVELA 800 mg Tab    SENSIPAR 30 mg Tab    tizanidine 4 mg Cap    warfarin (COUMADIN) 5 MG tablet    ACZONE 5 % topical gel    ammonium lactate (LAC-HYDRIN) 12 % lotion    diclofenac sodium (SOLARAZE) 3 % gel    doxepin (ZONALON) 5 % cream    econazole nitrate 1 % cream    ketorolac 0.5% (ACULAR) 0.5 % Drop     Family History     Problem Relation (Age of Onset)    Coronary artery disease Father    Diabetes Mother, Father, Maternal Grandmother    Hypertension Mother, Father    Kidney failure Mother    Lupus Sister        Social History Main Topics    Smoking status: Former Smoker     Packs/day: 0.50     Years: 10.00     Types: Cigarettes     Quit date: 4/22/1994    Smokeless tobacco: Never Used    Alcohol use No      Comment: rarely    Drug use: No    Sexual activity: Not Currently     Review of Systems   Constitutional: Positive for chills and fatigue. Negative for appetite change, fever and unexpected weight change.   HENT: Negative for facial swelling, hearing loss and tinnitus.    Eyes: Negative for visual disturbance.   Respiratory: Positive for chest tightness and shortness of breath. Negative for cough and wheezing.    Cardiovascular: Positive for leg swelling. Negative for chest pain.   Gastrointestinal: Positive for abdominal distention. Negative for abdominal pain and nausea.   Genitourinary: Negative for difficulty urinating, dysuria and flank pain.   Musculoskeletal: Negative for arthralgias and myalgias.   Skin: Negative for color change.   Neurological: Positive for tremors, weakness and light-headedness. Negative for dizziness, syncope and headaches.   Hematological: Positive for adenopathy.   Psychiatric/Behavioral: Negative for behavioral problems and confusion.     Objective:      Vital Signs (Most Recent):  Temp: 98.6 °F (37 °C) (01/02/18 0654)  Pulse: 69 (01/02/18 1430)  Resp: 20 (01/02/18 1430)  BP: 128/84 (01/02/18 1430)  SpO2: 100 % (01/02/18 1430)  O2 Device (Oxygen Therapy): Non Rebreather Mask (01/02/18 0654) Vital Signs (24h Range):  Temp:  [98.6 °F (37 °C)] 98.6 °F (37 °C)  Pulse:  [68-69] 69  Resp:  [18-24] 20  SpO2:  [98 %-100 %] 100 %  BP: (107-151)/(69-89) 128/84     Weight: 129.3 kg (285 lb) (01/02/18 0654)  Body mass index is 42.09 kg/m².  Body surface area is 2.51 meters squared.    No intake/output data recorded.    Physical Exam   Constitutional: She is oriented to person, place, and time. She appears well-developed and well-nourished. No distress. Nasal cannula in place.   HENT:   Head: Normocephalic and atraumatic.   Eyes: Conjunctivae are normal. Pupils are equal, round, and reactive to light.   Neck: Trachea normal and normal range of motion. Neck supple. No JVD present.   Cardiovascular: Normal rate, regular rhythm, S1 normal, S2 normal, normal heart sounds, intact distal pulses and normal pulses.  Exam reveals no gallop and no friction rub.    No murmur heard.  Pulmonary/Chest: Effort normal. She has rales in the right lower field and the left lower field.   Abdominal: Soft. Bowel sounds are normal. She exhibits no distension. There is no tenderness.   Musculoskeletal: Normal range of motion. She exhibits edema (1+ pitting edema).   Neurological: She is alert and oriented to person, place, and time.   Skin: Skin is warm and dry. Capillary refill takes less than 2 seconds.   Psychiatric: She has a normal mood and affect. Her behavior is normal.   Vitals reviewed.      Significant Labs:  BMP:   Recent Labs  Lab 01/02/18  0718   *      CO2 16*   BUN 54*   CREATININE 9.7*   CALCIUM 9.0     Cardiac Markers: No results for input(s): CKMB, TROPONINT, MYOGLOBIN in the last 168 hours.  CBC:   Recent Labs  Lab 01/02/18  0718   WBC 8.77   RBC 4.63   HGB 13.6    HCT 46.4      *   MCH 29.4   MCHC 29.3*     CMP:   Recent Labs  Lab 01/02/18  0718   *   CALCIUM 9.0   ALBUMIN 3.0*   PROT 7.0      K 5.5*   CO2 16*      BUN 54*   CREATININE 9.7*   ALKPHOS 172*   ALT 40   AST 48*   BILITOT 0.5     All labs within the past 24 hours have been reviewed.    Significant Imaging:  Labs: Reviewed  ECG: Reviewed  X-Ray: Reviewed    Assessment/Plan:     ESRD on hemodialysis    ESRD on IHD MWF / TTS   Out patient HD Center - [facility]  On HD for:1.8 years  Duration of outpatient dialysis session -   EDW - 1231 KG  Residual Renal Function - minimal  - Will provide dialysis for metabolic clearance and volume management  - Seen and examined today during hemodialysis; tolerating treatment well without issues. Denied headaches, chest pain, abdominal pain, or muscle cramps   -  ml/min  - Target ultrafiltration 4 lts as tolerated Keep MAP > 65  - Dialysate adjusted to current labs   Access: [facility] good bruit        Anemia in ESRD (end-stage renal disease)    - Hg  Target  - Will resume JINA    -Will request iron studies to assess further needs of supplementation         Hyperkalemia    - Renal diet  - will improve with RRT   - RFP to assess daily        Pulmonary sarcoidosis    As per tahir            Thank you for your consult. I will follow-up with patient. Please contact us if you have any additional questions.    Mickey Jones MD  Nephrology  [facility]    I have reviewed and concur with the fellow's history, physical, assessment, and plan. I have personally interviewed and examined the patient at bedside.

## 2018-01-02 NOTE — ASSESSMENT & PLAN NOTE
-Digoxin 125mch every other day  -Coumadin 5mg QD for anticoagulation  -Currently rate controlled

## 2018-01-02 NOTE — ED NOTES
Hourly rounding complete. Patient resting in stretcher and is in NAD at this time. Pt is awake alert and oriented x4, VSS, respirations even and unlabored. Pt updated on POC. Bed low and locked with side rails up x2, call bell in pt reach.

## 2018-01-02 NOTE — SUBJECTIVE & OBJECTIVE
Past Medical History:   Diagnosis Date    Anemia in ESRD (end-stage renal disease) 3/7/2016    Anticoagulant long-term use     Cervical radiculopathy 2015    CHF (congestive heart failure)     Chronic combined systolic and diastolic heart failure 2013    EF 20% , improved with Medical therapy, negative PET     Chronic respiratory failure with hypoxia 2013    On home oxygen at 2-5 liters    Closed fracture of proximal end of right fibula 2016    Complications due to renal dialysis device, implant, and graft     DDD (degenerative disc disease), lumbar 2015    Dependence on renal dialysis     Diabetes mellitus type II, controlled 2017    ESRD on hemodialysis 2016    Essential hypertension     Gout     Lateral meniscal tear 2016    Lumbar stenosis 2015    Pacemaker     Paroxysmal atrial fibrillation 2014    Not on anticoagulation    Peripheral neuropathy 2013    Personal history of gastric ulcer 3/19/2013    Sarcoidosis, lung 2009    Diagnosed in  with ocular manifestation, on 4L home O2 and PO steroids    Secondary pulmonary hypertension 2015    Seizure disorder 3/19/2013    Seizures     Shingles 2013    Thyroid disease        Past Surgical History:   Procedure Laterality Date    ABDOMINAL SURGERY      CARDIAC PACEMAKER PLACEMENT       SECTION      x2    OTHER SURGICAL HISTORY      loop recorder implant    stent to fistula      VASCULAR SURGERY      fistula to L upper arm        Review of patient's allergies indicates:   Allergen Reactions    Bactrim [sulfamethoxazole-trimethoprim] Other (See Comments)     Causes renal failure    Nsaids (non-steroidal anti-inflammatory drug) Other (See Comments)     Renal failure     Current Facility-Administered Medications   Medication Frequency    0.9%  NaCl infusion PRN    0.9%  NaCl infusion Once    0.9%  NaCl infusion PRN    0.9%  NaCl infusion Once     acetaminophen tablet 650 mg Q4H PRN    albuterol-ipratropium 2.5mg-0.5mg/3mL nebulizer solution 3 mL Q6H WAKE    atorvastatin tablet 80 mg Daily    [START ON 1/3/2018] cinacalcet tablet 30 mg Daily with breakfast    clopidogrel tablet 75 mg Once    dextrose 50% injection 12.5 g PRN    dextrose 50% injection 25 g PRN    [START ON 1/4/2018] digoxin tablet 0.125 mg Every other day    fludrocortisone tablet 100 mcg BID    glucagon (human recombinant) injection 1 mg PRN    glucose chewable tablet 16 g PRN    glucose chewable tablet 24 g PRN    insulin aspart pen 0-5 Units QID (AC + HS) PRN    levETIRAcetam tablet 500 mg BID    [START ON 1/3/2018] midodrine tablet 10 mg Every Mon, Wed, Fri, Sun    [START ON 1/4/2018] midodrine tablet 15 mg Every Tues, Thurs, Sat    midodrine tablet 15 mg PRN    nortriptyline capsule 25 mg QHS    ondansetron disintegrating tablet 8 mg Q8H PRN    [START ON 1/3/2018] pantoprazole EC tablet 40 mg Daily    polyethylene glycol packet 17 g Daily    prednisoLONE acetate 1 % ophthalmic suspension 1 drop BID    predniSONE tablet 10 mg Daily    ramelteon tablet 8 mg Nightly PRN    senna-docusate 8.6-50 mg per tablet 1 tablet BID PRN    sevelamer carbonate tablet 800 mg Daily with dinner    sodium chloride 0.9% flush 5 mL PRN    tiZANidine tablet 2 mg Daily PRN    [START ON 1/3/2018] warfarin (COUMADIN) tablet 5 mg Daily     Current Outpatient Prescriptions   Medication    atorvastatin (LIPITOR) 80 MG tablet    cholecalciferol, vitamin D3, 1,000 unit capsule    clopidogrel (PLAVIX) 75 mg tablet    digoxin (LANOXIN) 125 mcg tablet    fludrocortisone (FLORINEF) 0.1 mg Tab    gabapentin (NEURONTIN) 300 MG capsule    levETIRAcetam (KEPPRA) 500 MG Tab    midodrine (PROAMATINE) 5 MG Tab    nortriptyline (PAMELOR) 25 MG capsule    omeprazole (PRILOSEC) 20 MG capsule    polyethylene glycol (GLYCOLAX) 17 gram/dose powder    prednisoLONE acetate (PRED FORTE) 1 % DrpS     predniSONE (DELTASONE) 10 MG tablet    predniSONE (DELTASONE) 10 MG tablet    RENVELA 800 mg Tab    SENSIPAR 30 mg Tab    tizanidine 4 mg Cap    warfarin (COUMADIN) 5 MG tablet    ACZONE 5 % topical gel    ammonium lactate (LAC-HYDRIN) 12 % lotion    diclofenac sodium (SOLARAZE) 3 % gel    doxepin (ZONALON) 5 % cream    econazole nitrate 1 % cream    ketorolac 0.5% (ACULAR) 0.5 % Drop     Family History     Problem Relation (Age of Onset)    Coronary artery disease Father    Diabetes Mother, Father, Maternal Grandmother    Hypertension Mother, Father    Kidney failure Mother    Lupus Sister        Social History Main Topics    Smoking status: Former Smoker     Packs/day: 0.50     Years: 10.00     Types: Cigarettes     Quit date: 4/22/1994    Smokeless tobacco: Never Used    Alcohol use No      Comment: rarely    Drug use: No    Sexual activity: Not Currently     Review of Systems   Constitutional: Positive for chills and fatigue. Negative for appetite change, fever and unexpected weight change.   HENT: Negative for facial swelling, hearing loss and tinnitus.    Eyes: Negative for visual disturbance.   Respiratory: Positive for chest tightness and shortness of breath. Negative for cough and wheezing.    Cardiovascular: Positive for leg swelling. Negative for chest pain.   Gastrointestinal: Positive for abdominal distention. Negative for abdominal pain and nausea.   Genitourinary: Negative for difficulty urinating, dysuria and flank pain.   Musculoskeletal: Negative for arthralgias and myalgias.   Skin: Negative for color change.   Neurological: Positive for tremors, weakness and light-headedness. Negative for dizziness, syncope and headaches.   Hematological: Positive for adenopathy.   Psychiatric/Behavioral: Negative for behavioral problems and confusion.     Objective:     Vital Signs (Most Recent):  Temp: 98.6 °F (37 °C) (01/02/18 0654)  Pulse: 69 (01/02/18 1430)  Resp: 20 (01/02/18 1430)  BP:  128/84 (01/02/18 1430)  SpO2: 100 % (01/02/18 1430)  O2 Device (Oxygen Therapy): Non Rebreather Mask (01/02/18 0654) Vital Signs (24h Range):  Temp:  [98.6 °F (37 °C)] 98.6 °F (37 °C)  Pulse:  [68-69] 69  Resp:  [18-24] 20  SpO2:  [98 %-100 %] 100 %  BP: (107-151)/(69-89) 128/84     Weight: 129.3 kg (285 lb) (01/02/18 0654)  Body mass index is 42.09 kg/m².  Body surface area is 2.51 meters squared.    No intake/output data recorded.    Physical Exam   Constitutional: She is oriented to person, place, and time. She appears well-developed and well-nourished. No distress. Nasal cannula in place.   HENT:   Head: Normocephalic and atraumatic.   Eyes: Conjunctivae are normal. Pupils are equal, round, and reactive to light.   Neck: Trachea normal and normal range of motion. Neck supple. No JVD present.   Cardiovascular: Normal rate, regular rhythm, S1 normal, S2 normal, normal heart sounds, intact distal pulses and normal pulses.  Exam reveals no gallop and no friction rub.    No murmur heard.  Pulmonary/Chest: Effort normal. She has rales in the right lower field and the left lower field.   Abdominal: Soft. Bowel sounds are normal. She exhibits no distension. There is no tenderness.   Musculoskeletal: Normal range of motion. She exhibits edema (1+ pitting edema).   Neurological: She is alert and oriented to person, place, and time.   Skin: Skin is warm and dry. Capillary refill takes less than 2 seconds.   Psychiatric: She has a normal mood and affect. Her behavior is normal.   Vitals reviewed.      Significant Labs:  BMP:   Recent Labs  Lab 01/02/18  0718   *      CO2 16*   BUN 54*   CREATININE 9.7*   CALCIUM 9.0     Cardiac Markers: No results for input(s): CKMB, TROPONINT, MYOGLOBIN in the last 168 hours.  CBC:   Recent Labs  Lab 01/02/18  0718   WBC 8.77   RBC 4.63   HGB 13.6   HCT 46.4      *   MCH 29.4   MCHC 29.3*     CMP:   Recent Labs  Lab 01/02/18  0718   *   CALCIUM 9.0    ALBUMIN 3.0*   PROT 7.0      K 5.5*   CO2 16*      BUN 54*   CREATININE 9.7*   ALKPHOS 172*   ALT 40   AST 48*   BILITOT 0.5     All labs within the past 24 hours have been reviewed.    Significant Imaging:  Labs: Reviewed  ECG: Reviewed  X-Ray: Reviewed

## 2018-01-02 NOTE — ED NOTES
Hourly rounding complete. Patient resting in stretcher and is in NAD at this time. Pt is awake alert and oriented x4, VSS, respirations even and unlabored. Pt aware of POC. Bed low and locked with side rails up x2, call bell in pt reach. Pt voices no needs at this time.

## 2018-01-02 NOTE — ED NOTES
Pt sitting up in stretcher resting, awakens easily to voice. Pt aware waiting for room to become available on admitting unit. Pt also aware waiting for to go to dialysis. Pt offers no complaints at this time. VSS. NAD. Will continue to monitor

## 2018-01-02 NOTE — ASSESSMENT & PLAN NOTE
-Patient initially with increased oxygen requirements, placed on 15L high flow NC in the ED.  -Patient currently satting well on her home oxygen requirement of 5L NC.  -On exam patient appears well, in NAD.  -Likely 2/2 to volume overload. Evidence of pulmonary edema on CXR in patient with CHF and ESRD. Patient states she had increased PO intake of fluids 2/2 increased thirst. She was hyperglycemic in ED, likely 2/2 to long term prednisone use.  -Low suspicion for infectious process given clinical history and imaging. But blood cultures drawn and f/u pro-sam.  -Will not initiate Abx at this time. No leukocytosis and afebrile.   -No signs of ischemic changes on EKG, Afib rate controlled.   -Nephrology consulted and patient will be dialyzed today.   -Continue to monitor oxygen requirements, but patient appears to be at baseline.   -Cardiac/diabetic diet, 1500cc fluid restriction.

## 2018-01-02 NOTE — ASSESSMENT & PLAN NOTE
-Last ECHO 12/08 with EF 30-35%  -Digoxin 125mcg every other day  -s/p dual chamber pacemaker placed 2016.

## 2018-01-02 NOTE — HPI
Sisi Medel is a pleasant 57 year old female with history of chronic respiratory failure 2/2 pulmonary sarcoidosis, CHF s/p pacemaker placement 2016 (Last echo 12/09 EF 30-35%), ESRD on HD(TTS), permanent A-fib on coumadin, seizure disorder who presents with SOB. Patient complains of SOB that began yesterday evening associated with a cough productive of white sputum, and increased lower extremity edema. Patient states these symptoms progressed after awakening this morning. She drove to her dialysis center this morning for her scheduled HD, and she was noted to be tachypnic with an SpO2 of 76% on RA. She was asked to go to the ED where she was placed on 15L high flow NC and began satting well. Patient endorses a recent history of increased thirst and fluid intake for past week. She denies orthopnea, chest pain, palpitations, blurry vision, Fever, rhinorhea, diarrhea, abdominal pain, sick contacts, and denies any history of diabetes.   Patient has required 5L of home oxygen for past 6 years 2/2 to pulmonary sarcoidosis, for which she has been on a stable dose of 10mg QD. Patient is a former smoker, having quit 25 years ago with only a 2p.y. smoking history. Patient states she watches how much salt intake she receives in her diet, but otherwise does not restrict her diet. She is compliant with all of her medications.

## 2018-01-02 NOTE — ASSESSMENT & PLAN NOTE
-A1c 6.5 on last admission earlier in December.  -patient denies any history of diabetes. Does not take any insulin at home  -Very likely the source of polydipsia that has contributed to current hypervolemia.   -most likely glucose intolerance from long term steroid use versus sarcoid  -Diabetic diet, LDSSI.

## 2018-01-02 NOTE — HPI
Ms Sisi Medel is a pleasant 58 y/o Afr[place] afemale with history of chronic respiratory failure 2/2 pulmonary sarcoidosis, HFrEF s/p pacemaker placement 2016 (Last echo 12/09 EF 30-35%), ESRD on HD TTS, permanent A-fib on coumadin, seizure disorder who presents with SOB. SOB began yesterday evening associated with a cough productive of white sputum, and increased lower extremity edema. Admitted to Hospital Medicine secondary to tachycardia and SOB. Her symptoms started this am and slowly have improved. She drove her self to her dialysis center as usual for her routine HD. Upon arival, she was noted to be tachypnic and hypoxic with an SpO2 of 76% on RA. She was placed on a NRB mask at  He HD unita db was transferred to ED. She was asked to go to the ED where she was placed on 15L high flow NC and began satting well. Patient endorses a recent history of increased thirst and fluid intake for past week. She denies orthopnea, chest pain, palpitations, blurry vision, Fever, rhinorhea, diarrhea, abdominal pain, sick contacts, and denies any history of diabetes.   Patient has required 5L of home oxygen for past 6 years 2/2 to pulmonary sarcoidosis, for which she has been on a stable dose of 10mg QD. Patient is a former smoker, having quit 25 years ago with only a 2p.y. smoking history. Patient states she watches how much salt intake she receives in her diet, but otherwise does not restrict her diet. She is compliant with all of her medications.    She dialyses at [facility] via [facility] under the care of for 5 hrs with  Kg. Last HD Saturday.

## 2018-01-02 NOTE — SUBJECTIVE & OBJECTIVE
Past Medical History:   Diagnosis Date    Anemia in ESRD (end-stage renal disease) 3/7/2016    Anticoagulant long-term use     Cervical radiculopathy 2015    CHF (congestive heart failure)     Chronic combined systolic and diastolic heart failure 2013    EF 20% , improved with Medical therapy, negative PET     Chronic respiratory failure with hypoxia 2013    On home oxygen at 2-5 liters    Closed fracture of proximal end of right fibula 2016    Complications due to renal dialysis device, implant, and graft     DDD (degenerative disc disease), lumbar 2015    Dependence on renal dialysis     Diabetes mellitus type II, controlled 2017    ESRD on hemodialysis 2016    Essential hypertension     Gout     Lateral meniscal tear 2016    Lumbar stenosis 2015    Pacemaker     Paroxysmal atrial fibrillation 2014    Not on anticoagulation    Peripheral neuropathy 2013    Personal history of gastric ulcer 3/19/2013    Sarcoidosis, lung 2009    Diagnosed in  with ocular manifestation, on 4L home O2 and PO steroids    Secondary pulmonary hypertension 2015    Seizure disorder 3/19/2013    Seizures     Shingles 2013    Thyroid disease        Past Surgical History:   Procedure Laterality Date    ABDOMINAL SURGERY      CARDIAC PACEMAKER PLACEMENT       SECTION      x2    OTHER SURGICAL HISTORY      loop recorder implant    stent to fistula      VASCULAR SURGERY      fistula to L upper arm        Review of patient's allergies indicates:   Allergen Reactions    Bactrim [sulfamethoxazole-trimethoprim] Other (See Comments)     Causes renal failure    Nsaids (non-steroidal anti-inflammatory drug) Other (See Comments)     Renal failure       No current facility-administered medications on file prior to encounter.      Current Outpatient Prescriptions on File Prior to Encounter   Medication Sig    atorvastatin (LIPITOR) 80  MG tablet TAKE 1 TABLET(80 MG) BY MOUTH EVERY NIGHT    cholecalciferol, vitamin D3, 1,000 unit capsule Take 1 capsule (1,000 Units total) by mouth once daily. (Patient taking differently: Take 1,000 Units by mouth every evening. )    clopidogrel (PLAVIX) 75 mg tablet 75 mg.    digoxin (LANOXIN) 125 mcg tablet Take 1 tablet (0.125 mg total) by mouth every other day.    fludrocortisone (FLORINEF) 0.1 mg Tab Take 100 mcg by mouth 2 (two) times daily.    gabapentin (NEURONTIN) 300 MG capsule TK ONE C PO  TID    levETIRAcetam (KEPPRA) 500 MG Tab Take 1 tablet (500 mg total) by mouth 2 (two) times daily.    midodrine (PROAMATINE) 5 MG Tab Take 2 tablets (10 mg total) by mouth every Mon, Wed, Fri, Sun.    nortriptyline (PAMELOR) 25 MG capsule Take 1 capsule (25 mg total) by mouth every evening.    omeprazole (PRILOSEC) 20 MG capsule TAKE 1 CAPSULE(20 MG) BY MOUTH EVERY DAY    polyethylene glycol (GLYCOLAX) 17 gram/dose powder Take 17 g by mouth once daily. Dissolve in juice.    prednisoLONE acetate (PRED FORTE) 1 % DrpS INSTILL ONE DROP INTO  LEFT EYE THREE TIMES A DAY    predniSONE (DELTASONE) 10 MG tablet Take 1 tablet (10 mg total) by mouth once daily.    predniSONE (DELTASONE) 10 MG tablet TAKE 1 TABLET BY MOUTH EVERY DAY WITH BREAKFAST    RENVELA 800 mg Tab TAKE 1 TABLET BY MOUTH THREE TIMES DAILY WITH MEALS    SENSIPAR 30 mg Tab     tizanidine 4 mg Cap TAKE 1 CAPSULE BY MOUTH DAILY AS NEEDED FOR MUSCLE SPASM    warfarin (COUMADIN) 5 MG tablet Take 1 tablet (5 mg total) by mouth Daily. [facility] (Patient taking differently: Take 5 mg by mouth every evening. Per [facility])    ACZONE 5 % topical gel Apply to face every morning    ammonium lactate (LAC-HYDRIN) 12 % lotion Apply topically as needed (to legs).     diclofenac sodium (SOLARAZE) 3 % gel     doxepin (ZONALON) 5 % cream     econazole nitrate 1 % cream APPLY TOPICALLY TWICE DAILY AS NEEDED FOR FUNGAL SKIN INFECTIONS. USE FOR  2 TO 4 WEEKS    ketorolac 0.5% (ACULAR) 0.5 % Drop instill one drop into both eyes every morning     Family History     Problem Relation (Age of Onset)    Coronary artery disease Father    Diabetes Mother, Father, Maternal Grandmother    Hypertension Mother, Father    Kidney failure Mother    Lupus Sister        Social History Main Topics    Smoking status: Former Smoker     Packs/day: 0.50     Years: 10.00     Types: Cigarettes     Quit date: 4/22/1994    Smokeless tobacco: Never Used    Alcohol use No      Comment: rarely    Drug use: No    Sexual activity: Not Currently     Review of Systems   Constitutional: Positive for chills. Negative for appetite change, fatigue and fever.   HENT: Positive for sore throat (2/2 frequent cough). Negative for postnasal drip, rhinorrhea, sinus pain, sinus pressure, sneezing and trouble swallowing.    Eyes: Positive for redness (chronic 2/2 sarcoid). Negative for visual disturbance.   Respiratory: Positive for cough (productive of white sputum) and shortness of breath. Negative for wheezing.    Cardiovascular: Positive for leg swelling. Negative for chest pain and palpitations.   Gastrointestinal: Negative for abdominal distention, abdominal pain, constipation, diarrhea, nausea and vomiting.   Endocrine: Positive for polydipsia.   Musculoskeletal: Negative for arthralgias, myalgias, neck pain and neck stiffness.   Allergic/Immunologic: Positive for immunocompromised state (chronic prednisone use).   Neurological: Positive for numbness (chronic, bilateral feet). Negative for dizziness, tremors, seizures, syncope, facial asymmetry, speech difficulty, weakness, light-headedness and headaches.   Psychiatric/Behavioral: Negative for confusion.     Objective:     Vital Signs (Most Recent):  Temp: 98.6 °F (37 °C) (01/02/18 0654)  Pulse: 69 (01/02/18 1100)  Resp: 20 (01/02/18 1045)  BP: 121/77 (01/02/18 1100)  SpO2: 99 % (01/02/18 1100) Vital Signs (24h Range):  Temp:  [98.6 °F (37  °C)] 98.6 °F (37 °C)  Pulse:  [68-69] 69  Resp:  [18-24] 20  SpO2:  [99 %-100 %] 99 %  BP: (117-151)/(71-89) 121/77     Weight: 129.3 kg (285 lb)  Body mass index is 42.09 kg/m².    Physical Exam   Constitutional: She is oriented to person, place, and time. She appears well-developed and well-nourished. No distress.   HENT:   Head: Normocephalic and atraumatic.   Dry mucus membranes   Eyes: EOM are normal. Pupils are equal, round, and reactive to light.   Injected sclerae bilaterally   Neck: Normal range of motion. No JVD present.   Cardiovascular: Normal rate, regular rhythm, normal heart sounds and intact distal pulses.    No murmur heard.  LUE fistula with thrill   Pulmonary/Chest: Effort normal. No respiratory distress. She has no wheezes.   Rales bilaterally worse in the lower lobe fields. No absent breath sounds.    Abdominal: Soft. Bowel sounds are normal. She exhibits no distension. There is no tenderness.   Musculoskeletal: Normal range of motion. She exhibits edema (bilateral lower extremities to knees). She exhibits no tenderness.   Lymphadenopathy:     She has no cervical adenopathy.   Neurological: She is alert and oriented to person, place, and time. No cranial nerve deficit.   5/5 strength bilaterally.  Neuropathy as stated in bilateral lower extremities in feet.    Skin: She is not diaphoretic.   Vitals reviewed.        CRANIAL NERVES     CN III, IV, VI   Pupils are equal, round, and reactive to light.  Extraocular motions are normal.        Significant Labs:   Recent Results (from the past 24 hour(s))   Brain natriuretic peptide    Collection Time: 01/02/18  7:18 AM   Result Value Ref Range     (H) 0 - 99 pg/mL   CBC auto differential    Collection Time: 01/02/18  7:18 AM   Result Value Ref Range    WBC 8.77 3.90 - 12.70 K/uL    RBC 4.63 4.00 - 5.40 M/uL    Hemoglobin 13.6 12.0 - 16.0 g/dL    Hematocrit 46.4 37.0 - 48.5 %     (H) 82 - 98 fL    MCH 29.4 27.0 - 31.0 pg    MCHC 29.3 (L)  32.0 - 36.0 g/dL    RDW 14.6 (H) 11.5 - 14.5 %    Platelets 196 150 - 350 K/uL    MPV 10.7 9.2 - 12.9 fL    Immature Granulocytes 0.5 0.0 - 0.5 %    Gran # 6.8 1.8 - 7.7 K/uL    Immature Grans (Abs) 0.04 0.00 - 0.04 K/uL    Lymph # 0.7 (L) 1.0 - 4.8 K/uL    Mono # 1.1 (H) 0.3 - 1.0 K/uL    Eos # 0.1 0.0 - 0.5 K/uL    Baso # 0.02 0.00 - 0.20 K/uL    nRBC 0 0 /100 WBC    Gran% 78.0 (H) 38.0 - 73.0 %    Lymph% 8.4 (L) 18.0 - 48.0 %    Mono% 12.1 4.0 - 15.0 %    Eosinophil% 0.8 0.0 - 8.0 %    Basophil% 0.2 0.0 - 1.9 %    Differential Method Automated    Comprehensive metabolic panel    Collection Time: 01/02/18  7:18 AM   Result Value Ref Range    Sodium 137 136 - 145 mmol/L    Potassium 5.5 (H) 3.5 - 5.1 mmol/L    Chloride 103 95 - 110 mmol/L    CO2 16 (L) 23 - 29 mmol/L    Glucose 214 (H) 70 - 110 mg/dL    BUN, Bld 54 (H) 6 - 20 mg/dL    Creatinine 9.7 (H) 0.5 - 1.4 mg/dL    Calcium 9.0 8.7 - 10.5 mg/dL    Total Protein 7.0 6.0 - 8.4 g/dL    Albumin 3.0 (L) 3.5 - 5.2 g/dL    Total Bilirubin 0.5 0.1 - 1.0 mg/dL    Alkaline Phosphatase 172 (H) 55 - 135 U/L    AST 48 (H) 10 - 40 U/L    ALT 40 10 - 44 U/L    Anion Gap 18 (H) 8 - 16 mmol/L    eGFR if African American 4.7 (A) >60 mL/min/1.73 m^2    eGFR if non  4.0 (A) >60 mL/min/1.73 m^2   Troponin I    Collection Time: 01/02/18  7:18 AM   Result Value Ref Range    Troponin I 0.162 (H) 0.000 - 0.026 ng/mL   Protime-INR    Collection Time: 01/02/18  7:53 AM   Result Value Ref Range    Prothrombin Time 22.7 (H) 9.0 - 12.5 sec    INR 2.3 (H) 0.8 - 1.2         Significant Imaging:   CXR 1/2/2018:  There is a pacer, cardiomegaly, moderate to severe edema, aortic plaque, and no change.

## 2018-01-03 VITALS
WEIGHT: 284.38 LBS | HEIGHT: 69 IN | SYSTOLIC BLOOD PRESSURE: 115 MMHG | OXYGEN SATURATION: 98 % | DIASTOLIC BLOOD PRESSURE: 79 MMHG | TEMPERATURE: 98 F | RESPIRATION RATE: 18 BRPM | BODY MASS INDEX: 42.12 KG/M2 | HEART RATE: 71 BPM

## 2018-01-03 LAB
ANION GAP SERPL CALC-SCNC: 19 MMOL/L
ANISOCYTOSIS BLD QL SMEAR: SLIGHT
BASOPHILS # BLD AUTO: 0.03 K/UL
BASOPHILS NFR BLD: 0.4 %
BUN SERPL-MCNC: 37 MG/DL
CALCIUM SERPL-MCNC: 8.9 MG/DL
CHLORIDE SERPL-SCNC: 93 MMOL/L
CO2 SERPL-SCNC: 26 MMOL/L
CREAT SERPL-MCNC: 7.3 MG/DL
DIFFERENTIAL METHOD: ABNORMAL
EOSINOPHIL # BLD AUTO: 0.1 K/UL
EOSINOPHIL NFR BLD: 1.2 %
ERYTHROCYTE [DISTWIDTH] IN BLOOD BY AUTOMATED COUNT: 14.3 %
EST. GFR  (AFRICAN AMERICAN): 6.6 ML/MIN/1.73 M^2
EST. GFR  (NON AFRICAN AMERICAN): 5.7 ML/MIN/1.73 M^2
GLUCOSE SERPL-MCNC: 55 MG/DL
HCT VFR BLD AUTO: 43 %
HGB BLD-MCNC: 13.6 G/DL
IMM GRANULOCYTES # BLD AUTO: 0.07 K/UL
IMM GRANULOCYTES NFR BLD AUTO: 0.8 %
INR PPP: 2.1
LYMPHOCYTES # BLD AUTO: 1 K/UL
LYMPHOCYTES NFR BLD: 12.2 %
MAGNESIUM SERPL-MCNC: 2.4 MG/DL
MCH RBC QN AUTO: 28.9 PG
MCHC RBC AUTO-ENTMCNC: 31.6 G/DL
MCV RBC AUTO: 91 FL
MONOCYTES # BLD AUTO: 1 K/UL
MONOCYTES NFR BLD: 11.8 %
NEUTROPHILS # BLD AUTO: 6.1 K/UL
NEUTROPHILS NFR BLD: 73.6 %
NRBC BLD-RTO: 0 /100 WBC
PHOSPHATE SERPL-MCNC: 4.1 MG/DL
PLATELET # BLD AUTO: 134 K/UL
PLATELET BLD QL SMEAR: ABNORMAL
PMV BLD AUTO: 12.2 FL
POCT GLUCOSE: 109 MG/DL (ref 70–110)
POCT GLUCOSE: 126 MG/DL (ref 70–110)
POTASSIUM SERPL-SCNC: 4.9 MMOL/L
PROTHROMBIN TIME: 21.3 SEC
RBC # BLD AUTO: 4.71 M/UL
SODIUM SERPL-SCNC: 138 MMOL/L
WBC # BLD AUTO: 8.31 K/UL

## 2018-01-03 PROCEDURE — G8980 MOBILITY D/C STATUS: HCPCS | Mod: CH

## 2018-01-03 PROCEDURE — 25000003 PHARM REV CODE 250: Performed by: STUDENT IN AN ORGANIZED HEALTH CARE EDUCATION/TRAINING PROGRAM

## 2018-01-03 PROCEDURE — 80048 BASIC METABOLIC PNL TOTAL CA: CPT

## 2018-01-03 PROCEDURE — G8988 SELF CARE GOAL STATUS: HCPCS | Mod: CH

## 2018-01-03 PROCEDURE — G8978 MOBILITY CURRENT STATUS: HCPCS | Mod: CH

## 2018-01-03 PROCEDURE — 84100 ASSAY OF PHOSPHORUS: CPT

## 2018-01-03 PROCEDURE — 25000003 PHARM REV CODE 250: Performed by: INTERNAL MEDICINE

## 2018-01-03 PROCEDURE — 27000221 HC OXYGEN, UP TO 24 HOURS

## 2018-01-03 PROCEDURE — G8979 MOBILITY GOAL STATUS: HCPCS | Mod: CH

## 2018-01-03 PROCEDURE — 90935 HEMODIALYSIS ONE EVALUATION: CPT | Mod: ,,, | Performed by: INTERNAL MEDICINE

## 2018-01-03 PROCEDURE — 94640 AIRWAY INHALATION TREATMENT: CPT

## 2018-01-03 PROCEDURE — G8987 SELF CARE CURRENT STATUS: HCPCS | Mod: CH

## 2018-01-03 PROCEDURE — 99217 PR OBSERVATION CARE DISCHARGE: CPT | Mod: GC,,, | Performed by: HOSPITALIST

## 2018-01-03 PROCEDURE — G0257 UNSCHED DIALYSIS ESRD PT HOS: HCPCS

## 2018-01-03 PROCEDURE — G8989 SELF CARE D/C STATUS: HCPCS | Mod: CH

## 2018-01-03 PROCEDURE — 97530 THERAPEUTIC ACTIVITIES: CPT

## 2018-01-03 PROCEDURE — 97165 OT EVAL LOW COMPLEX 30 MIN: CPT

## 2018-01-03 PROCEDURE — 97116 GAIT TRAINING THERAPY: CPT | Mod: 59

## 2018-01-03 PROCEDURE — 36415 COLL VENOUS BLD VENIPUNCTURE: CPT

## 2018-01-03 PROCEDURE — 85610 PROTHROMBIN TIME: CPT

## 2018-01-03 PROCEDURE — 63600175 PHARM REV CODE 636 W HCPCS: Performed by: STUDENT IN AN ORGANIZED HEALTH CARE EDUCATION/TRAINING PROGRAM

## 2018-01-03 PROCEDURE — 25000242 PHARM REV CODE 250 ALT 637 W/ HCPCS: Performed by: STUDENT IN AN ORGANIZED HEALTH CARE EDUCATION/TRAINING PROGRAM

## 2018-01-03 PROCEDURE — 90935 HEMODIALYSIS ONE EVALUATION: CPT

## 2018-01-03 PROCEDURE — 83735 ASSAY OF MAGNESIUM: CPT

## 2018-01-03 PROCEDURE — 97161 PT EVAL LOW COMPLEX 20 MIN: CPT

## 2018-01-03 PROCEDURE — 87040 BLOOD CULTURE FOR BACTERIA: CPT

## 2018-01-03 PROCEDURE — G0378 HOSPITAL OBSERVATION PER HR: HCPCS

## 2018-01-03 PROCEDURE — 85025 COMPLETE CBC W/AUTO DIFF WBC: CPT

## 2018-01-03 PROCEDURE — 94761 N-INVAS EAR/PLS OXIMETRY MLT: CPT

## 2018-01-03 RX ORDER — SODIUM CHLORIDE 9 MG/ML
INJECTION, SOLUTION INTRAVENOUS
Status: DISCONTINUED | OUTPATIENT
Start: 2018-01-03 | End: 2018-01-03 | Stop reason: HOSPADM

## 2018-01-03 RX ORDER — SODIUM CHLORIDE 9 MG/ML
INJECTION, SOLUTION INTRAVENOUS ONCE
Status: DISCONTINUED | OUTPATIENT
Start: 2018-01-03 | End: 2018-01-03 | Stop reason: HOSPADM

## 2018-01-03 RX ORDER — MIDODRINE HYDROCHLORIDE 5 MG/1
15 TABLET ORAL ONCE
Status: COMPLETED | OUTPATIENT
Start: 2018-01-03 | End: 2018-01-03

## 2018-01-03 RX ADMIN — IPRATROPIUM BROMIDE AND ALBUTEROL SULFATE 3 ML: .5; 3 SOLUTION RESPIRATORY (INHALATION) at 02:01

## 2018-01-03 RX ADMIN — CINACALCET HYDROCHLORIDE 30 MG: 30 TABLET, COATED ORAL at 11:01

## 2018-01-03 RX ADMIN — MIDODRINE HYDROCHLORIDE 15 MG: 5 TABLET ORAL at 03:01

## 2018-01-03 RX ADMIN — WARFARIN SODIUM 5 MG: 5 TABLET ORAL at 06:01

## 2018-01-03 RX ADMIN — PANTOPRAZOLE SODIUM 40 MG: 40 TABLET, DELAYED RELEASE ORAL at 11:01

## 2018-01-03 RX ADMIN — PREDNISOLONE ACETATE 1 DROP: 10 SUSPENSION/ DROPS OPHTHALMIC at 11:01

## 2018-01-03 RX ADMIN — FLUDROCORTISONE ACETATE 100 MCG: 0.1 TABLET ORAL at 11:01

## 2018-01-03 RX ADMIN — LEVETIRACETAM 500 MG: 500 TABLET ORAL at 11:01

## 2018-01-03 RX ADMIN — IPRATROPIUM BROMIDE AND ALBUTEROL SULFATE 3 ML: .5; 3 SOLUTION RESPIRATORY (INHALATION) at 09:01

## 2018-01-03 RX ADMIN — PREDNISONE 10 MG: 10 TABLET ORAL at 11:01

## 2018-01-03 RX ADMIN — SEVELAMER CARBONATE 800 MG: 800 TABLET, FILM COATED ORAL at 06:01

## 2018-01-03 RX ADMIN — POLYETHYLENE GLYCOL 3350 17 G: 17 POWDER, FOR SOLUTION ORAL at 11:01

## 2018-01-03 RX ADMIN — ACETAMINOPHEN 650 MG: 325 TABLET ORAL at 04:01

## 2018-01-03 RX ADMIN — ATORVASTATIN CALCIUM 80 MG: 20 TABLET, FILM COATED ORAL at 11:01

## 2018-01-03 NOTE — PLAN OF CARE
Carlos A Caputo MD   [address] [facility] / [facility] [address]       [facility] - [place], [place] - [address]  [address]  [address]  [place] [address]  Phone: [phone] Fax: [phone]    [facility] [employer] [id number] - [place], [place] - [address] [facility]  [address]  [facility] [address]  Phone: [phone] Fax: [phone]    Payor: [employer] MEDICARE / Plan: [employer] / Product Type: Medicare Advantage /         01/03/18 0923   Discharge Assessment   Assessment Type Discharge Planning Assessment   Confirmed/corrected address and phone number on facesheet? Yes   Assessment information obtained from? Patient   Expected Length of Stay (days) 1   Communicated expected length of stay with patient/caregiver yes   Prior to hospitilization cognitive status: Alert/Oriented   Prior to hospitalization functional status: Assistive Equipment   Current cognitive status: Alert/Oriented   Current Functional Status: Assistive Equipment   Lives With significant other   Able to Return to Prior Arrangements yes   Is patient able to care for self after discharge? Yes   Who are your caregiver(s) and their phone number(s)? Maikel Magallanes (mother) [phone]   Patient's perception of discharge disposition home health   Readmission Within The Last 30 Days no previous admission in last 30 days   Patient currently being followed by outpatient case management? No   Patient currently receives any other outside agency services? No   Equipment Currently Used at Home cane, quad;rollator   Do you have any problems affording any of your prescribed medications? No   Is the patient taking medications as prescribed? yes   Does the patient have transportation home? Yes   Transportation Available family or friend will provide   Dialysis Name and Scheduled days [facility] -T,Th,Sat   Discharge Plan A Home with family   Discharge Plan B Home  Health   Patient/Family In Agreement With Plan yes

## 2018-01-03 NOTE — PLAN OF CARE
Sw did fax Pt's face sheet, h & p and home health orders to [facility] to  and was with [facility] in the past.

## 2018-01-03 NOTE — PT/OT/SLP EVAL
Occupational Therapy   Evaluation, Treatment and Discharge Note    Name: Sisi Medel  MRN: [id number]  Admitting Diagnosis:  Acute on chronic respiratory failure with hypoxia      Recommendations:     Discharge Recommendations: outpatient PT  Discharge Equipment Recommendations:  none  Barriers to discharge:  None    History:     Occupational Profile:  Living Environment: Pt lives with a friend in a 1SH with 5 KAMERON and B rails  Previous level of function: (I), uses QC for mobility  Roles and Routines: friend  Equipment Owned:  cane, quad  Assistance upon Discharge: available    Past Medical History:   Diagnosis Date    Anemia in ESRD (end-stage renal disease) 3/7/2016    Anticoagulant long-term use     Cervical radiculopathy 7/20/2015    CHF (congestive heart failure)     Chronic combined systolic and diastolic heart failure 6/14/2013    EF 20% 2013, improved with Medical therapy, negative PET 2013    Chronic respiratory failure with hypoxia 04/22/2013    On home oxygen at 2-5 liters    Closed fracture of proximal end of right fibula 6/27/2016    Complications due to renal dialysis device, implant, and graft     DDD (degenerative disc disease), lumbar 6/17/2015    Dependence on renal dialysis     Diabetes mellitus type II, controlled 12/8/2017    ESRD on hemodialysis 2/23/2016    Essential hypertension     Gout     Lateral meniscal tear 5/31/2016    Lumbar stenosis 6/17/2015    Pacemaker     Paroxysmal atrial fibrillation 5/16/2014    Not on anticoagulation    Peripheral neuropathy 11/13/2013    Personal history of gastric ulcer 3/19/2013    Sarcoidosis, lung 2009    Diagnosed in 2009 with ocular manifestation, on 4L home O2 and PO steroids    Secondary pulmonary hypertension 4/7/2015    Seizure disorder 3/19/2013    Seizures     Shingles 11/13/2013    Thyroid disease        Past Surgical History:   Procedure Laterality Date    ABDOMINAL SURGERY      CARDIAC PACEMAKER PLACEMENT        SECTION      x2    OTHER SURGICAL HISTORY      loop recorder implant    stent to fistula      VASCULAR SURGERY      fistula to L upper arm        Subjective     Chief Complaint: none  Patient/Family stated goals: go home  Communicated with: RN prior to session.  Pain/Comfort:  · Pain Rating 1: 0/10  · Pain Rating Post-Intervention 1: 0/10    Objective:     Patient found with: oxygen    General Precautions: Standard, fall     Occupational Performance:    Bed Mobility:    · Patient completed Supine to Sit with independence    Functional Mobility/Transfers:  · Patient completed Sit <> Stand Transfer with modified independence  with  quad cane   · Patient completed Bed <> Chair Transfer using Stand Pivot technique with modified independence with quad cane    Activities of Daily Living:  · Feeding:  independence    · UB Dressing: independence    · LB Dressing: independence socks, pants  · Toileting: independence at toilet    Cognitive/Visual Perceptual:  Cognitive/Psychosocial Skills:     -       Oriented to: Person, Place, Time and Situation   -       Follows Commands/attention:Follows multistep  commands  -       Communication: clear/fluent  -       Memory: No Deficits noted  -       Safety awareness/insight to disability: intact   -       Mood/Affect/Coping skills/emotional control: Appropriate to situation  Visual/Perceptual:      -Intact    Physical Exam:  Balance:    -       Sitting = Good; Standing = Fair  Postural examination/scapula alignment:    -       No postural abnormalities identified  Dominant hand:    -       R  Upper Extremity Range of Motion:     -       Right Upper Extremity: WNL  -       Left Upper Extremity: WNL  Upper Extremity Strength:    -       Right Upper Extremity: WFL  -       Left Upper Extremity: WFL   Strength:    -       Right Upper Extremity: WFL  -       Left Upper Extremity: WFL  Fine Motor Coordination:    -       Intact  Gross motor coordination:   WFL    Patient  "left sitting EOB with all lines intact and call button in reach    [facility] 6 Click:  [facility] Total Score: 24    Treatment & Education:  Pt ed re OT role and POC. Pt is (I) in all ADLs and safe with mobility with QC.  Education:    Assessment:     Sisi Medel is a 57 y.o. female with a medical diagnosis of Acute on chronic respiratory failure with hypoxia. At this time, patient is functioning at their prior level of function and does not require further acute OT services.     Clinical Decision Makin.  OT Low:  "Pt evaluation falls under low complexity for evaluation coding due to performance deficits noted in 1-3 areas as stated above and 0 co-morbities affecting current functional status. Data obtained from problem focused assessments. No modifications or assistance was required for completion of evaluation. Only brief occupational profile and history review completed."     Plan:     During this hospitalization, patient does not require further acute OT services.  Please re-consult if situation changes.    · Plan of Care Reviewed with: patient    This Plan of care has been discussed with the patient who was involved in its development and understands and is in agreement with the identified goals and treatment plan    GOALS:    Occupational Therapy Goals     Not on file          Multidisciplinary Problems (Resolved)        Problem: Occupational Therapy Goal    Goal Priority Disciplines Outcome Interventions   Occupational Therapy Goal   (Resolved)     OT, PT/OT Outcome(s) achieved                    Time Tracking:     OT Date of Treatment: 18  OT Start Time: 838  OT Stop Time: 857  OT Total Time (min): 19 min    Billable Minutes:Evaluation 11 minutes  Therapeutic Activity 8 minutes    BALTA Campbell  1/3/2018  Pager: [phone]    "

## 2018-01-03 NOTE — NURSING
IM 2 notified of positive blood cultures. Instructed to hold pt's discharge orders for now. No other orders given at this time.

## 2018-01-03 NOTE — PLAN OF CARE
Problem: Occupational Therapy Goal  Goal: Occupational Therapy Goal  Outcome: Outcome(s) achieved Date Met: 01/03/18  Pt is (I). OT to d/c, but recommend OPPT for gait training.    BALTA Campbell  1/3/2018  Pager: [phone]

## 2018-01-03 NOTE — PLAN OF CARE
Future Appointments  Date Time Provider Department Center   1/12/2018 2:00 PM VASCULAR, LAB [facility] VASCL[facility] [address]   1/12/2018 2:45 PM Torrey Villafana MD [facility] VASCSUR [address]   1/16/2018 2:00 PM Denise Hamlin DPM [facility] POD [address]   1/26/2018 3:20 PM Carlos A Caputo MD [facility] IM [facility] PC[facility]   2/12/2018 8:00 AM HOME MONITOR DEVICE CHECK, [facility] ARRHYTH [address]        01/03/18 1436   Final Note   Assessment Type Final Discharge Note   Discharge Disposition Home   What phone number can be called within the next 1-3 days to see how you are doing after discharge? [phone]   Hospital Follow Up  Appt(s) scheduled? Yes   Right [facility] Referral Info   Post Acute Recommendation No Care

## 2018-01-03 NOTE — PT/OT/SLP EVAL
Physical Therapy Evaluation and Discharge Note    Patient Name:  Sisi Medel   MRN:  [id number]    Recommendations:     Discharge Recommendations:  outpatient PT   Discharge Equipment Recommendations: none   Barriers to discharge: None    Assessment:     Sisi Medel is a 57 y.o. female admitted with a medical diagnosis of Acute on chronic respiratory failure with hypoxia. .  At this time, patient is functioning at their prior level of function and does not require further acute PT services.     Recent Surgery: * No surgery found *      Plan:     During this hospitalization, patient does not require further acute PT services.  Please re-consult if situation changes.     Plan of Care Reviewed with: patient    Subjective     Communicated with nurse prior to session.  Patient found supine w/ HOB elevated upon PT entry to room, agreeable to evaluation.      Chief Complaint: decreased endurance and gait instabiliyt  Patient comments/goals: to get better  Pain/Comfort:  · Pain Rating 1: 0/10    Patients cultural, spiritual, Moravian conflicts given the current situation: none stated    Living Environment:  Patient lives with her friend in a single story house, 5STE w/ BHR.   Prior to admission, patients level of function was Independent and used a quad cane for ambulation.  Patient has the following equipment: cane, quad, oxygen.  DME owned (not currently used): quad cane.  Upon discharge, patient will have assistance from friend.    Objective:     Patient found with: oxygen     General Precautions: Standard, fall   Orthopedic Precautions:N/A   Braces:       Exams:  · Cognitive Exam:  Patient is oriented to Person, Place, Time and Situation and follows 100% of  all commands   · Gross Motor Coordination:  WFL  · RLE ROM: WFL  · RLE Strength: WFL  · LLE ROM: WFL  · LLE Strength: WFL    Functional Mobility:  · Bed Mobility:     · Supine to Sit: supervision  · Sit to Supine: supervision  · Transfers:   Sit to Stand:  stand by assistance with no AD  · Gait: 110'  · Stairs:  Pt ascended/descended 5 stair(s) with Quad Cane with right handrail with Stand-by Assistance.     AM-PAC 6 CLICK MOBILITY  Total Score:24       Therapeutic Activities and Exercises:   PT Eval completed  Patient quad cane adjusted corrected for patient's height and legs adjusted to correct orientation   Patient educated on correct use of quad cane in appropriate LE    Patient left with bed in chair position with all lines intact, call button in reach and nurse notified.    GOALS:    Physical Therapy Goals     Not on file          Multidisciplinary Problems (Resolved)        Problem: Physical Therapy Goal    Goal Priority Disciplines Outcome Goal Variances Interventions   Physical Therapy Goal   (Resolved)     PT/OT, PT Outcome(s) achieved     Description:  Eval and DC complete  Patient has no inpatient PT needs  Discontinue Orders                      History:     Past Medical History:   Diagnosis Date    Anemia in ESRD (end-stage renal disease) 3/7/2016    Anticoagulant long-term use     Cervical radiculopathy 7/20/2015    CHF (congestive heart failure)     Chronic combined systolic and diastolic heart failure 6/14/2013    EF 20% 2013, improved with Medical therapy, negative PET 2013    Chronic respiratory failure with hypoxia 04/22/2013    On home oxygen at 2-5 liters    Closed fracture of proximal end of right fibula 6/27/2016    Complications due to renal dialysis device, implant, and graft     DDD (degenerative disc disease), lumbar 6/17/2015    Dependence on renal dialysis     Diabetes mellitus type II, controlled 12/8/2017    ESRD on hemodialysis 2/23/2016    Essential hypertension     Gout     Lateral meniscal tear 5/31/2016    Lumbar stenosis 6/17/2015    Pacemaker     Paroxysmal atrial fibrillation 5/16/2014    Not on anticoagulation    Peripheral neuropathy 11/13/2013    Personal history of gastric ulcer 3/19/2013     Sarcoidosis, lung 2009    Diagnosed in  with ocular manifestation, on 4L home O2 and PO steroids    Secondary pulmonary hypertension 2015    Seizure disorder 3/19/2013    Seizures     Shingles 2013    Thyroid disease        Past Surgical History:   Procedure Laterality Date    ABDOMINAL SURGERY      CARDIAC PACEMAKER PLACEMENT       SECTION      x2    OTHER SURGICAL HISTORY      loop recorder implant    stent to fistula      VASCULAR SURGERY      fistula to L upper arm        Clinical Decision Making:     History  Co-morbidities and personal factors that may impact the plan of care Examination  Body Structures and Functions, activity limitations and participation restrictions that may impact the plan of care Clinical Presentation   Decision Making/ Complexity Score   Co-morbidities:   [] Time since onset of injury / illness / exacerbation  [] Status of current condition  []Patient's cognitive status and safety concerns    [] Multiple Medical Problems (see med hx)  Personal Factors:   [] Patient's age  [] Prior Level of function   [] Patient's home situation (environment and family support)  [] Patient's level of motivation  [] Expected progression of patient      HISTORY:(criteria)    [] 75242 - no personal factors/history    [] 98949 - has 1-2 personal factor/comorbidity     [] 01845 - has >3 personal factor/comorbidity     Body Regions:  [] Objective examination findings  [] Head     []  Neck  [] Trunk   [] Upper Extremity  [] Lower Extremity    Body Systems:  [] For communication ability, affect, cognition, language, and learning style: the assessment of the ability to make needs known, consciousness, orientation (person, place, and time), expected emotional /behavioral responses, and learning preferences (eg, learning barriers, education  needs)  [] For the neuromuscular system: a general assessment of gross coordinated movement (eg, balance, gait, locomotion, transfers, and  transitions) and motor function  (motor control and motor learning)  [] For the musculoskeletal system: the assessment of gross symmetry, gross range of motion, gross strength, height, and weight  [] For the integumentary system: the assessment of pliability(texture), presence of scar formation, skin color, and skin integrity  [] For cardiovascular/pulmonary system: the assessment of heart rate, respiratory rate, blood pressure, and edema     Activity limitations:    [] Patient's cognitive status and saf ety concerns          [] Status of current condition      [] Weight bearing restriction  [] Cardiopulmunary Restriction    Participation Restrictions:   [] Goals and goal agreement with the patient     [] Rehab potential (prognosis) and probable outcome      Examination of Body System: (criteria)    [] 69275 - addressing 1-2 elements    [] 63052 - addressing a total of 3 or more elements     [] 43298 -  Addressing a total of 4 or more elements         Clinical Presentation: (criteria)  Choose one     On examination of body system using standardized tests and measures patient presents with 1-2 elements from any of the following: body structures and functions, activity limitations, and/or participation restrictions.  Leading to a clinical presentation that is considered stable and/or uncomplicated                              Clinical Decision Making  (Eval Complexity):  Low- 41167     Time Tracking:     PT Received On: 01/03/18  PT Start Time: 0838     PT Stop Time: 0856  PT Total Time (min): 18 min     Billable Minutes: Evaluation 10 Min and Gait Training 8 Min      Natanael Medel, PT  01/03/2018

## 2018-01-03 NOTE — PLAN OF CARE
[facility]    HOME HEALTH ORDERS  FACE TO FACE ENCOUNTER    Patient Name: Sisi Medel  YOB: 1960    PCP: Carlos A Caputo MD   [facility]P [facility] [address] [facility] [address]  PCP Phone Number: [phone]  PCP Fax: [phone]    Encounter Date: 01/03/2018    Admit to Home Health    Diagnoses:  Active Hospital Problems    Diagnosis  POA    *Acute on chronic respiratory failure with hypoxia [J96.21]  Yes    Hyperkalemia [E87.5]  Yes    Hyperglycemia [R73.9]  Yes    Episode of transient neurologic symptoms [R29.90]  Yes    Biventricular cardiac pacemaker in situ [Z95.0]  Yes    Permanent atrial fibrillation [I48.2]  Yes    Hemodialysis-associated hypotension [I95.3]  Yes    GERD (gastroesophageal reflux disease) [K21.9]  Yes    Immunosuppressed status [D89.9]  Yes    Current use of long term anticoagulation [Z79.01]  Not Applicable    Hyperlipidemia [E78.5]  Yes     Chronic    Anemia in ESRD (end-stage renal disease) [N18.6, D63.1]  Yes    ESRD on hemodialysis [N18.6, Z99.2]  Not Applicable     Chronic    Pulmonary hypertension [I27.20]  Yes    Morbid obesity with BMI of 40.0-44.9, adult [E66.01, Z68.41]  Not Applicable     Chronic    Chronic combined systolic and diastolic heart failure [I50.42]  Yes     Chronic     EF 20% 2013, improved with Medical therapy, negative PET 2013      Chronic respiratory failure [J96.10]  Yes     Chronic    Pulmonary sarcoidosis [D86.0]  Yes     Chronic    Seizure disorder [G40.909]  Yes     Chronic      Resolved Hospital Problems    Diagnosis Date Resolved POA   No resolved problems to display.       Future Appointments  Date Time Provider Department Center   1/3/2018 9:20 AM Jefferson Corral MD [facility] STROKE [facility]   1/3/2018 12:15 PM DIALYSIS, [facility] DLYS [facility]   1/12/2018 2:00 PM VASCULAR, LAB [facility] VASCLAB [facility]   1/12/2018 2:45 PM Torrey Villafana MD [facility] VASCSUR [facility]   1/16/2018  2:00 PM Denise Hamlin DPM [facility] POD [facility]   2/12/2018 8:00 AM HOME MONITOR DEVICE CHECK, [facility] ARRHYTH [facility]     Follow-up Information     Carlos A Caputo MD In 2 weeks.    Specialty:  Internal Medicine  Why:  Post hospitalization follow up and evaluation re: need to start hypoglycemic medications  Contact information:  [address]  [facility] [address]  [phone]                     I have seen and examined this patient face to face today. My clinical findings that support the need for the home health skilled services and home bound status are the following:  Weakness/numbness causing balance and gait disturbance due to Weakness/Debility making it taxing to leave home.    Allergies:  Review of patient's allergies indicates:   Allergen Reactions    Bactrim [sulfamethoxazole-trimethoprim] Other (See Comments)     Causes renal failure    Nsaids (non-steroidal anti-inflammatory drug) Other (See Comments)     Renal failure       Diet: diabetic diet: 2200 calorie    Activities: activity as tolerated    Nursing:   SN to complete comprehensive assessment including routine vital signs. Instruct on disease process and s/s of complications to report to MD. Review/verify medication list sent home with the patient at time of discharge  and instruct patient/caregiver as needed. Frequency may be adjusted depending on start of care date.    Notify MD if SBP > 160 or < 90; DBP > 90 or < 50; HR > 120 or < 50; Temp > 101      CONSULTS:    Physical Therapy to evaluate and treat. Evaluate for home safety and equipment needs; Establish/upgrade home exercise program. Perform / instruct on therapeutic exercises, gait training, transfer training, and Range of Motion.  Occupational Therapy to evaluate and treat. Evaluate home environment for safety and equipment needs. Perform/Instruct on transfers, ADL training, ROM, and therapeutic exercises.    MISCELLANEOUS CARE:  N/A    WOUND CARE ORDERS  n/a      Medications:  Review discharge medications with patient and family and provide education.      Current Discharge Medication List      CONTINUE these medications which have NOT CHANGED    Details   atorvastatin (LIPITOR) 80 MG tablet TAKE 1 TABLET(80 MG) BY MOUTH EVERY NIGHT  Qty: 90 tablet, Refills: 3    Associated Diagnoses: Chronic combined systolic and diastolic heart failure      cholecalciferol, vitamin D3, 1,000 unit capsule Take 1 capsule (1,000 Units total) by mouth once daily.  Refills: 0      clopidogrel (PLAVIX) 75 mg tablet 75 mg.      digoxin (LANOXIN) 125 mcg tablet Take 1 tablet (0.125 mg total) by mouth every other day.  Qty: 15 tablet, Refills: 1      fludrocortisone (FLORINEF) 0.1 mg Tab Take 100 mcg by mouth 2 (two) times daily.      gabapentin (NEURONTIN) 300 MG capsule TK ONE C PO  TID  Refills: 2      levETIRAcetam (KEPPRA) 500 MG Tab Take 1 tablet (500 mg total) by mouth 2 (two) times daily.  Qty: 180 tablet, Refills: 3    Associated Diagnoses: Seizure disorder      midodrine (PROAMATINE) 5 MG Tab Take 2 tablets (10 mg total) by mouth every Mon, Wed, Fri, Sun.  Qty: 32 tablet, Refills: 11      nortriptyline (PAMELOR) 25 MG capsule Take 1 capsule (25 mg total) by mouth every evening.  Qty: 30 capsule, Refills: 11    Associated Diagnoses: Paresthesias      omeprazole (PRILOSEC) 20 MG capsule TAKE 1 CAPSULE(20 MG) BY MOUTH EVERY DAY  Qty: 90 capsule, Refills: 3    Comments: **Patient requests 90 days supply**  Associated Diagnoses: Gastroesophageal reflux disease with esophagitis      polyethylene glycol (GLYCOLAX) 17 gram/dose powder Take 17 g by mouth once daily. Dissolve in juice.  Qty: 510 g, Refills: 0      prednisoLONE acetate (PRED FORTE) 1 % DrpS INSTILL ONE DROP INTO  LEFT EYE THREE TIMES A DAY  Refills: 3      predniSONE (DELTASONE) 10 MG tablet TAKE 1 TABLET BY MOUTH EVERY DAY WITH BREAKFAST  Qty: 90 tablet, Refills: 0    Associated Diagnoses: Sarcoidosis of lung      RENVELA 800 mg Tab TAKE 1  TABLET BY MOUTH THREE TIMES DAILY WITH MEALS  Qty: 90 tablet, Refills: 0      SENSIPAR 30 mg Tab       tizanidine 4 mg Cap TAKE 1 CAPSULE BY MOUTH DAILY AS NEEDED FOR MUSCLE SPASM  Qty: 90 capsule, Refills: 0    Comments: **Patient requests 90 days supply**  Associated Diagnoses: Muscle spasm      warfarin (COUMADIN) 5 MG tablet Take 1 tablet (5 mg total) by mouth Daily. Per [facility]  Qty: 90 tablet, Refills: 3    Comments: **Patient requests 90 days supply**  Associated Diagnoses: Long-term (current) use of anticoagulants; Paroxysmal atrial fibrillation      ACZONE 5 % topical gel Apply to face every morning  Refills: 2      ammonium lactate (LAC-HYDRIN) 12 % lotion Apply topically as needed (to legs).   Refills: 2      diclofenac sodium (SOLARAZE) 3 % gel       doxepin (ZONALON) 5 % cream       econazole nitrate 1 % cream APPLY TOPICALLY TWICE DAILY AS NEEDED FOR FUNGAL SKIN INFECTIONS. USE FOR 2 TO 4 WEEKS  Qty: 30 g, Refills: 0      ketorolac 0.5% (ACULAR) 0.5 % Drop instill one drop into both eyes every morning  Refills: 3             I certify that this patient is confined to her home and needs physical therapy and occupational therapy.

## 2018-01-03 NOTE — PROGRESS NOTES
HD treatment started. No complications with access to left upper arm. Lines secured and telemetry in place. No complaint of discomfort at this time.

## 2018-01-03 NOTE — PLAN OF CARE
Problem: Patient Care Overview  Goal: Plan of Care Review  Outcome: Ongoing (interventions implemented as appropriate)  3 hour dialysis complete.  Blood rinsed back.  Needles pulled from RAISSA graft.  Pressure held x 5 minutes.  Hemostasis achieved.  Covered with gauze and paper tape.  +thrill +bruit.  Net UF 4L.  Tolerated well.  Feraheme given.  Transported from dialysis unit to room 054A via wheelchair by transporter with home portable O2 tank, belongsings bag, cane and phone.

## 2018-01-03 NOTE — PLAN OF CARE
Problem: Fall Risk (Adult)  Goal: Absence of Falls  Patient will demonstrate the desired outcomes by discharge/transition of care.   Outcome: Ongoing (interventions implemented as appropriate)  Absence of falls noted at present. SR up x's2. Fall risk protocol IP. Pt's personal cane within reach. Pt enc to call for assistance prior to getting OOB. Verbalized understanding. Call bell within reach. Will cont to monitor.

## 2018-01-03 NOTE — PLAN OF CARE
Problem: Physical Therapy Goal  Goal: Physical Therapy Goal  Eval and DC complete  Patient has no inpatient PT needs  Discontinue Orders    Outcome: Outcome(s) achieved Date Met: 01/03/18  Eval and DC complete  Patient has no inpatient PT needs  Discontinue Orders    Natanael Medel, PT  1/3/2018

## 2018-01-04 NOTE — ASSESSMENT & PLAN NOTE
TTS schedule, last session Saturday 12/30/2017.  -Did not receive HD session today  -Nephro consulted for HD while inpatient. 4 L removed on 1/2.  -Sensipar 30  -Renvela 800 with evening meal (Home regimen.)  -Midodrine 10mg MWF and Sun, 15mg TThSat for HD associated hypotension.

## 2018-01-04 NOTE — NURSING
Sitting up in bed. A&O X4. Denies c/o. No distress noted. Assessment complete. Instructed of dialysis tx this afternoon. Reports understanding. SR up X2. CB within reach.

## 2018-01-04 NOTE — DISCHARGE SUMMARY
[facility] Hospital Medicine  Discharge Summary      Patient Name: Sisi Medel  MRN: [id number]  Admission Date: 1/2/2018  Hospital Length of Stay: 0 days  Discharge Date and Time:  01/03/2018 6:43 PM  Attending Physician: Ric Amador MD   Discharging Provider: Shanika Jung MD  Primary Care Provider: Carlos A Caputo MD  Hospital Medicine Team: [facility] MED 2 Shanika Jung MD    HPI:   Sisi Medel is a pleasant 57 year old female with history of chronic respiratory failure 2/2 pulmonary sarcoidosis, CHF s/p pacemaker placement 2016 (Last echo 12/09 EF 30-35%), ESRD on HD(TTS), permanent A-fib on coumadin, seizure disorder who presents with SOB. Patient complains of SOB that began yesterday evening associated with a cough productive of white sputum, and increased lower extremity edema. Patient states these symptoms progressed after awakening this morning. She drove to her dialysis center this morning for her scheduled HD, and she was noted to be tachypnic with an SpO2 of 76% on RA. She was asked to go to the ED where she was placed on 15L high flow NC and began satting well. Patient endorses a recent history of increased thirst and fluid intake for past week. She denies orthopnea, chest pain, palpitations, blurry vision, Fever, rhinorhea, diarrhea, abdominal pain, sick contacts, and denies any history of diabetes.   Patient has required 5L of home oxygen for past 6 years 2/2 to pulmonary sarcoidosis, for which she has been on a stable dose of 10mg QD. Patient is a former smoker, having quit 25 years ago with only a 2p.y. smoking history. Patient states she watches how much salt intake she receives in her diet, but otherwise does not restrict her diet. She is compliant with all of her medications.        * No surgery found *      Hospital Course:   1/2: Admitted to hospital medicine. Pt feeling much better following HD inpatient, 4 L removed.   1/3: Pt will  receive HD again today. Feeling well after HD yesterday.  1/4 Blood Cx positive for gram positive cocci; however pt is very well appearing and this is likely a contaminant.  Will repeat Blood Cx x2. Pt is well appearing and stable for discharge home.      Consults:   Consults         Status Ordering Provider     Inpatient consult to Nephrology  Once     Provider:  (Not yet assigned)    MATTHEW Conley          No new Assessment & Plan notes have been filed under this hospital service since the last note was generated.  Service: Hospital Medicine    Final Active Diagnoses:    Diagnosis Date Noted POA    PRINCIPAL PROBLEM:  Acute on chronic respiratory failure with hypoxia [J96.21] 11/17/2016 Yes    Hyperkalemia [E87.5] 01/02/2018 Yes    Hyperglycemia [R73.9] 01/02/2018 Yes    Episode of transient neurologic symptoms [R29.90] 12/08/2017 Yes    Biventricular cardiac pacemaker in situ [Z95.0] 08/04/2017 Yes    Permanent atrial fibrillation [I48.2] 06/12/2017 Yes    Hemodialysis-associated hypotension [I95.3] 01/21/2017 Yes    GERD (gastroesophageal reflux disease) [K21.9] 01/20/2017 Yes    Immunosuppressed status [D89.9] 11/26/2016 Yes    Current use of long term anticoagulation [Z79.01] 07/05/2016 Not Applicable    Hyperlipidemia [E78.5] 03/07/2016 Yes     Chronic    Anemia in ESRD (end-stage renal disease) [N18.6, D63.1] 03/07/2016 Yes    ESRD on hemodialysis [N18.6, Z99.2] 02/23/2016 Not Applicable     Chronic    Pulmonary hypertension [I27.20] 07/24/2014 Yes    Morbid obesity with BMI of 40.0-44.9, adult [E66.01, Z68.41] 08/15/2013 Not Applicable     Chronic    Chronic combined systolic and diastolic heart failure [I50.42] 06/14/2013 Yes     Chronic    Chronic respiratory failure [J96.10] 04/22/2013 Yes     Chronic    Pulmonary sarcoidosis [D86.0] 03/19/2013 Yes     Chronic    Seizure disorder [G40.909] 03/19/2013 Yes     Chronic      Problems Resolved During this Admission:    Diagnosis  Date Noted Date Resolved POA       Discharged Condition: good    Disposition:     Follow Up:  Follow-up Information     Carlos A Caputo MD In 2 weeks.    Specialty:  Internal Medicine  Why:  Post hospitalization follow up and evaluation re: need to start hypoglycemic medications  Contact information:  [address]  [facility] [address] [phone]             [facility]  Michele In 1 day.    Specialties:  Home Health Services, Physical Therapy, Occupational Therapy  Contact information:  [address] [place] [address]  [address]  [facility] [id number] [phone]                 Patient Instructions:     Activity as tolerated     Notify your health care provider if you experience any of the following:  temperature >100.4     Notify your health care provider if you experience any of the following:  persistent nausea and vomiting or diarrhea     Notify your health care provider if you experience any of the following:  severe uncontrolled pain     Notify your health care provider if you experience any of the following:  difficulty breathing or increased cough     Notify your health care provider if you experience any of the following:  severe persistent headache     Notify your health care provider if you experience any of the following:  persistent dizziness, light-headedness, or visual disturbances         Significant Diagnostic Studies:  Recent Results (from the past 24 hour(s))   Basic Metabolic Panel (BMP)    Collection Time: 01/03/18  4:24 AM   Result Value Ref Range    Sodium 138 136 - 145 mmol/L    Potassium 4.9 3.5 - 5.1 mmol/L    Chloride 93 (L) 95 - 110 mmol/L    CO2 26 23 - 29 mmol/L    Glucose 55 (L) 70 - 110 mg/dL    BUN, Bld 37 (H) 6 - 20 mg/dL    Creatinine 7.3 (H) 0.5 - 1.4 mg/dL    Calcium 8.9 8.7 - 10.5 mg/dL    Anion Gap 19 (H) 8 - 16 mmol/L    eGFR if African American 6.6 (A) >60 mL/min/1.73 m^2    eGFR if non African American 5.7 (A) >60 mL/min/1.73 m^2   Magnesium    Collection Time:  01/03/18  4:24 AM   Result Value Ref Range    Magnesium 2.4 1.6 - 2.6 mg/dL   Phosphorus    Collection Time: 01/03/18  4:24 AM   Result Value Ref Range    Phosphorus 4.1 2.7 - 4.5 mg/dL   PT/INR    Collection Time: 01/03/18  4:24 AM   Result Value Ref Range    Prothrombin Time 21.3 (H) 9.0 - 12.5 sec    INR 2.1 (H) 0.8 - 1.2   CBC auto differential    Collection Time: 01/03/18  4:24 AM   Result Value Ref Range    WBC 8.31 3.90 - 12.70 K/uL    RBC 4.71 4.00 - 5.40 M/uL    Hemoglobin 13.6 12.0 - 16.0 g/dL    Hematocrit 43.0 37.0 - 48.5 %    MCV 91 82 - 98 fL    MCH 28.9 27.0 - 31.0 pg    MCHC 31.6 (L) 32.0 - 36.0 g/dL    RDW 14.3 11.5 - 14.5 %    Platelets 134 (L) 150 - 350 K/uL    MPV 12.2 9.2 - 12.9 fL    Immature Granulocytes 0.8 (H) 0.0 - 0.5 %    Gran # 6.1 1.8 - 7.7 K/uL    Immature Grans (Abs) 0.07 (H) 0.00 - 0.04 K/uL    Lymph # 1.0 1.0 - 4.8 K/uL    Mono # 1.0 0.3 - 1.0 K/uL    Eos # 0.1 0.0 - 0.5 K/uL    Baso # 0.03 0.00 - 0.20 K/uL    nRBC 0 0 /100 WBC    Gran% 73.6 (H) 38.0 - 73.0 %    Lymph% 12.2 (L) 18.0 - 48.0 %    Mono% 11.8 4.0 - 15.0 %    Eosinophil% 1.2 0.0 - 8.0 %    Basophil% 0.4 0.0 - 1.9 %    Platelet Estimate Appears normal     Aniso Slight     Differential Method Automated    POCT glucose    Collection Time: 01/03/18  2:25 PM   Result Value Ref Range    POCT Glucose 109 70 - 110 mg/dL   POCT glucose    Collection Time: 01/03/18  5:05 PM   Result Value Ref Range    POCT Glucose 126 (H) 70 - 110 mg/dL     Imaging Results          X-Ray Chest 1 View (Final result)  Result time 01/02/18 07:43:44    Final result by Sherwin Lynn III, MD (01/02/18 07:43:44)                 Narrative:    One view: There is a pacer, cardiomegaly, moderate to severe edema, aortic plaque, and no change.      Electronically signed by: SHERWIN LYNN MD  Date:     01/02/18  Time:    07:43                                 Pending Diagnostic Studies:     None         Medications:  Reconciled Home Medications:   Current  Discharge Medication List      CONTINUE these medications which have NOT CHANGED    Details   atorvastatin (LIPITOR) 80 MG tablet TAKE 1 TABLET(80 MG) BY MOUTH EVERY NIGHT  Qty: 90 tablet, Refills: 3    Associated Diagnoses: Chronic combined systolic and diastolic heart failure      cholecalciferol, vitamin D3, 1,000 unit capsule Take 1 capsule (1,000 Units total) by mouth once daily.  Refills: 0      clopidogrel (PLAVIX) 75 mg tablet 75 mg.      digoxin (LANOXIN) 125 mcg tablet Take 1 tablet (0.125 mg total) by mouth every other day.  Qty: 15 tablet, Refills: 1      fludrocortisone (FLORINEF) 0.1 mg Tab Take 100 mcg by mouth 2 (two) times daily.      gabapentin (NEURONTIN) 300 MG capsule TK ONE C PO  TID  Refills: 2      levETIRAcetam (KEPPRA) 500 MG Tab Take 1 tablet (500 mg total) by mouth 2 (two) times daily.  Qty: 180 tablet, Refills: 3    Associated Diagnoses: Seizure disorder      midodrine (PROAMATINE) 5 MG Tab Take 2 tablets (10 mg total) by mouth every Mon, Wed, Fri, Sun.  Qty: 32 tablet, Refills: 11      nortriptyline (PAMELOR) 25 MG capsule Take 1 capsule (25 mg total) by mouth every evening.  Qty: 30 capsule, Refills: 11    Associated Diagnoses: Paresthesias      omeprazole (PRILOSEC) 20 MG capsule TAKE 1 CAPSULE(20 MG) BY MOUTH EVERY DAY  Qty: 90 capsule, Refills: 3    Comments: **Patient requests 90 days supply**  Associated Diagnoses: Gastroesophageal reflux disease with esophagitis      polyethylene glycol (GLYCOLAX) 17 gram/dose powder Take 17 g by mouth once daily. Dissolve in juice.  Qty: 510 g, Refills: 0      prednisoLONE acetate (PRED FORTE) 1 % DrpS INSTILL ONE DROP INTO  LEFT EYE THREE TIMES A DAY  Refills: 3      predniSONE (DELTASONE) 10 MG tablet TAKE 1 TABLET BY MOUTH EVERY DAY WITH BREAKFAST  Qty: 90 tablet, Refills: 0    Associated Diagnoses: Sarcoidosis of lung      RENVELA 800 mg Tab TAKE 1 TABLET BY MOUTH THREE TIMES DAILY WITH MEALS  Qty: 90 tablet, Refills: 0      SENSIPAR 30 mg  Tab       tizanidine 4 mg Cap TAKE 1 CAPSULE BY MOUTH DAILY AS NEEDED FOR MUSCLE SPASM  Qty: 90 capsule, Refills: 0    Comments: **Patient requests 90 days supply**  Associated Diagnoses: Muscle spasm      warfarin (COUMADIN) 5 MG tablet Take 1 tablet (5 mg total) by mouth Daily. Per [facility]  Qty: 90 tablet, Refills: 3    Comments: **Patient requests 90 days supply**  Associated Diagnoses: Long-term (current) use of anticoagulants; Paroxysmal atrial fibrillation      ACZONE 5 % topical gel Apply to face every morning  Refills: 2      ammonium lactate (LAC-HYDRIN) 12 % lotion Apply topically as needed (to legs).   Refills: 2      diclofenac sodium (SOLARAZE) 3 % gel       doxepin (ZONALON) 5 % cream       econazole nitrate 1 % cream APPLY TOPICALLY TWICE DAILY AS NEEDED FOR FUNGAL SKIN INFECTIONS. USE FOR 2 TO 4 WEEKS  Qty: 30 g, Refills: 0      ketorolac 0.5% (ACULAR) 0.5 % Drop instill one drop into both eyes every morning  Refills: 3             Indwelling Lines/Drains at time of discharge:   Lines/Drains/Airways     Central Venous Catheter Line                 Hemodialysis Catheter 02/04/17 1315 right internal jugular 333 days          Drain                 Hemodialysis AV Graft Left upper arm -- days         Hemodialysis AV Graft Left upper arm -- days         Hemodialysis AV Fistula 11/27/16 0920 Left upper arm 402 days                Time spent on the discharge of patient: 30 minutes  Patient was seen and examined on the date of discharge and determined to be suitable for discharge.         Shanika Jung MD  Department of Hospital Medicine  [facility]

## 2018-01-04 NOTE — PLAN OF CARE
Problem: Patient Care Overview  Goal: Plan of Care Review  Outcome: Ongoing (interventions implemented as appropriate)  HD treatment complete. Duration of treatment 3 hours and 4 L removed. Treatment was tolerated well and no complications with access to left upper arm. Needles removed and hemostasis achieved. Dressing intact and no drainage noted. Thrill and bruit present.

## 2018-01-04 NOTE — ASSESSMENT & PLAN NOTE
-Digoxin 125mcg every other day  -Coumadin 5mg QD for anticoagulation  -Currently rate controlled

## 2018-01-04 NOTE — ASSESSMENT & PLAN NOTE
-Patient initially with increased oxygen requirements, placed on 15L high flow NC in the ED.  -Patient currently satting well on her home oxygen requirement of 5L NC.  -On exam patient appears well, in NAD.  -Likely 2/2 to volume overload. Evidence of pulmonary edema on CXR in patient with CHF and ESRD. Patient states she had increased PO intake of fluids 2/2 increased thirst. She was hyperglycemic in ED, likely 2/2 to long term prednisone use.  -Low suspicion for infectious process given clinical history and imaging. 1/4 blood cultures growing gram positive cocci, however pt is very well appearing and this is likely a contaminant. Will repeat blood cx x2.   -Will not initiate Abx at this time. No leukocytosis and afebrile.   -No signs of ischemic changes on EKG, Afib rate controlled.   -Nephrology consulted for dialysis.  Pt reports significant improvement in sxs following removal of 4 L UF yesterday.   -Continue to monitor oxygen requirements, but patient appears to be at baseline.   -Cardiac/diabetic diet, 1500cc fluid restriction.

## 2018-01-04 NOTE — HOSPITAL COURSE
1/2: Admitted to hospital medicine. Pt feeling much better following HD inpatient, 4 L removed.   1/3: Pt will receive HD again today. Feeling well after HD yesterday.  1/4 Blood Cx positive for gram positive cocci; however pt is very well appearing and this is likely a contaminant.  Will repeat Blood Cx x2. Pt is well appearing and stable for discharge home.

## 2018-01-04 NOTE — PROGRESS NOTES
[facility] Hospital Medicine  Progress Note    Patient Name: Sisi Medel  MRN: [id number]  Patient Class: OP- Observation   Admission Date: 1/2/2018  Length of Stay: 0 days  Attending Physician: Ric Amador MD  Primary Care Provider: Carlos A Caputo MD    [facility] Medicine Team: [facility] 2 Shanika Jung MD    Subjective:     Principal Problem:Acute on chronic respiratory failure with hypoxia    HPI:  Sisi Medel is a pleasant 57 year old female with history of chronic respiratory failure 2/2 pulmonary sarcoidosis, CHF s/p pacemaker placement 2016 (Last echo 12/09 EF 30-35%), ESRD on HD(TTS), permanent A-fib on coumadin, seizure disorder who presents with SOB. Patient complains of SOB that began yesterday evening associated with a cough productive of white sputum, and increased lower extremity edema. Patient states these symptoms progressed after awakening this morning. She drove to her dialysis center this morning for her scheduled HD, and she was noted to be tachypnic with an SpO2 of 76% on RA. She was asked to go to the ED where she was placed on 15L high flow NC and began satting well. Patient endorses a recent history of increased thirst and fluid intake for past week. She denies orthopnea, chest pain, palpitations, blurry vision, Fever, rhinorhea, diarrhea, abdominal pain, sick contacts, and denies any history of diabetes.   Patient has required 5L of home oxygen for past 6 years 2/2 to pulmonary sarcoidosis, for which she has been on a stable dose of 10mg QD. Patient is a former smoker, having quit 25 years ago with only a 2p.y. smoking history. Patient states she watches how much salt intake she receives in her diet, but otherwise does not restrict her diet. She is compliant with all of her medications.        Hospital Course:  1/2: Admitted to hospital medicine. Pt feeling much better following HD inpatient, 4 L removed.   1/3: Pt will receive HD again  today. Feeling well after HD yesterday.  1/4 Blood Cx positive for gram positive cocci; however pt is very well appearing and this is likely a contaminant.  Will repeat Blood Cx x2. Pt is well appearing and stable for discharge home.     Interval History: NAEON. Pt denies any complaints at this time and states she feels much better after having HD yesterday. Breathing well on NC. Denies any fevers, chills, chest pain, SOB, abdominal pain, n/v/d. Plan for HD again today.       Review of Systems   Constitutional: Negative for activity change, appetite change, chills and fever.   Respiratory: Negative for cough and shortness of breath.         On home O2 NC   Cardiovascular: Negative for chest pain and palpitations.   Gastrointestinal: Negative for abdominal pain, diarrhea, nausea and vomiting.   Musculoskeletal: Negative for neck pain and neck stiffness.   Skin: Negative for rash and wound.   Neurological: Negative for dizziness and headaches.     Objective:     Vital Signs (Most Recent):  Temp: (P) 97.6 °F (36.4 °C) (01/03/18 1345)  Pulse: (P) 74 (01/03/18 1630)  Resp: (P) 18 (01/03/18 1630)  BP: (P) 114/81 (01/03/18 1630)  SpO2: 98 % (01/03/18 1437) Vital Signs (24h Range):  Temp:  [95.7 °F (35.4 °C)-98.9 °F (37.2 °C)] (P) 97.6 °F (36.4 °C)  Pulse:  [65-74] (P) 74  Resp:  [16-20] (P) 18  SpO2:  [96 %-100 %] 98 %  BP: (105-125)/(54-78) (P) 114/81     Weight: 129 kg (284 lb 6.4 oz)  Body mass index is 42 kg/m².    Intake/Output Summary (Last 24 hours) at 01/03/18 1830  Last data filed at 01/03/18 0500   Gross per 24 hour   Intake             1080 ml   Output             4600 ml   Net            -3520 ml      Physical Exam   Constitutional: She is oriented to person, place, and time. She appears well-developed and well-nourished. No distress.   Obese female   HENT:   Head: Normocephalic and atraumatic.   Eyes: EOM are normal. Pupils are equal, round, and reactive to light.   Neck: Normal range of motion. Neck supple.    Cardiovascular: Normal rate and regular rhythm.    Pulmonary/Chest: Effort normal. No respiratory distress. She has no wheezes. She has rales (few scattered at b/l bases).   NC in place   Abdominal: Soft. There is no tenderness. There is no guarding.   Musculoskeletal: She exhibits no edema or tenderness.   Neurological: She is alert and oriented to person, place, and time.   Skin: Skin is warm and dry.   Psychiatric: She has a normal mood and affect. Her behavior is normal.       Significant Labs:   Recent Results (from the past 24 hour(s))   Basic Metabolic Panel (BMP)    Collection Time: 01/03/18  4:24 AM   Result Value Ref Range    Sodium 138 136 - 145 mmol/L    Potassium 4.9 3.5 - 5.1 mmol/L    Chloride 93 (L) 95 - 110 mmol/L    CO2 26 23 - 29 mmol/L    Glucose 55 (L) 70 - 110 mg/dL    BUN, Bld 37 (H) 6 - 20 mg/dL    Creatinine 7.3 (H) 0.5 - 1.4 mg/dL    Calcium 8.9 8.7 - 10.5 mg/dL    Anion Gap 19 (H) 8 - 16 mmol/L    eGFR if African American 6.6 (A) >60 mL/min/1.73 m^2    eGFR if non African American 5.7 (A) >60 mL/min/1.73 m^2   Magnesium    Collection Time: 01/03/18  4:24 AM   Result Value Ref Range    Magnesium 2.4 1.6 - 2.6 mg/dL   Phosphorus    Collection Time: 01/03/18  4:24 AM   Result Value Ref Range    Phosphorus 4.1 2.7 - 4.5 mg/dL   PT/INR    Collection Time: 01/03/18  4:24 AM   Result Value Ref Range    Prothrombin Time 21.3 (H) 9.0 - 12.5 sec    INR 2.1 (H) 0.8 - 1.2   CBC auto differential    Collection Time: 01/03/18  4:24 AM   Result Value Ref Range    WBC 8.31 3.90 - 12.70 K/uL    RBC 4.71 4.00 - 5.40 M/uL    Hemoglobin 13.6 12.0 - 16.0 g/dL    Hematocrit 43.0 37.0 - 48.5 %    MCV 91 82 - 98 fL    MCH 28.9 27.0 - 31.0 pg    MCHC 31.6 (L) 32.0 - 36.0 g/dL    RDW 14.3 11.5 - 14.5 %    Platelets 134 (L) 150 - 350 K/uL    MPV 12.2 9.2 - 12.9 fL    Immature Granulocytes 0.8 (H) 0.0 - 0.5 %    Gran # 6.1 1.8 - 7.7 K/uL    Immature Grans (Abs) 0.07 (H) 0.00 - 0.04 K/uL    Lymph # 1.0 1.0 - 4.8  K/uL    Mono # 1.0 0.3 - 1.0 K/uL    Eos # 0.1 0.0 - 0.5 K/uL    Baso # 0.03 0.00 - 0.20 K/uL    nRBC 0 0 /100 WBC    Gran% 73.6 (H) 38.0 - 73.0 %    Lymph% 12.2 (L) 18.0 - 48.0 %    Mono% 11.8 4.0 - 15.0 %    Eosinophil% 1.2 0.0 - 8.0 %    Basophil% 0.4 0.0 - 1.9 %    Platelet Estimate Appears normal     Aniso Slight     Differential Method Automated    POCT glucose    Collection Time: 01/03/18  2:25 PM   Result Value Ref Range    POCT Glucose 109 70 - 110 mg/dL   POCT glucose    Collection Time: 01/03/18  5:05 PM   Result Value Ref Range    POCT Glucose 126 (H) 70 - 110 mg/dL         Significant Imaging: No new imaging      Assessment/Plan:      * Acute on chronic respiratory failure with hypoxia    -Patient initially with increased oxygen requirements, placed on 15L high flow NC in the ED.  -Patient currently satting well on her home oxygen requirement of 5L NC.  -On exam patient appears well, in NAD.  -Likely 2/2 to volume overload. Evidence of pulmonary edema on CXR in patient with CHF and ESRD. Patient states she had increased PO intake of fluids 2/2 increased thirst. She was hyperglycemic in ED, likely 2/2 to long term prednisone use.  -Low suspicion for infectious process given clinical history and imaging. 1/4 blood cultures growing gram positive cocci, however pt is very well appearing and this is likely a contaminant. Will repeat blood cx x2.   -Will not initiate Abx at this time. No leukocytosis and afebrile.   -No signs of ischemic changes on EKG, Afib rate controlled.   -Nephrology consulted for dialysis.  Pt reports significant improvement in sxs following removal of 4 L UF yesterday.   -Continue to monitor oxygen requirements, but patient appears to be at baseline.   -Cardiac/diabetic diet, 1500cc fluid restriction.             Hyperglycemia    -A1c 6.5 on last admission earlier in December.  -patient denies any history of diabetes. Does not take any insulin at home  -Very likely the source of  polydipsia that has contributed to current hypervolemia.   -most likely glucose intolerance from long term steroid use versus sarcoid  -Diabetic diet, LDSSI.           Hyperkalemia    - resolved after HD yesterday  -Monitor BMP  -HD today          Episode of transient neurologic symptoms    -Recent admission in December for TIA, symptoms since resolved.  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          Permanent atrial fibrillation    -Digoxin 125mcg every other day  -Coumadin 5mg QD for anticoagulation  -Currently rate controlled          Hemodialysis-associated hypotension    -See ESRD with HD          GERD (gastroesophageal reflux disease)    -protonix 40mg QD        Immunosuppressed status    - 2/2 to daily prednisone use          Current use of long term anticoagulation    -Daily INR  -Coumadin 5mg QD for afib          Chronic combined systolic and diastolic heart failure    -Last ECHO 12/08 with EF 30-35%  -Digoxin 125mcg every other day  -s/p dual chamber pacemaker placed 2016.           Anemia in ESRD (end-stage renal disease)    - Hgb Stable        Hyperlipidemia    -Atorvastatin 80          ESRD on hemodialysis    TTS schedule, last session Saturday 12/30/2017.  -Did not receive HD session today  -Nephro consulted for HD while inpatient. 4 L removed on 1/2.  -Sensipar 30  -Renvela 800 with evening meal (Home regimen.)  -Midodrine 10mg MWF and Sun, 15mg TThSat for HD associated hypotension.            Morbid obesity with BMI of 40.0-44.9, adult    -PT/OT          Chronic respiratory failure    -See acute respiratory failure.  -On home oxygen 5L for past 6 years.        Seizure disorder    Patient with history of epilepsy, follows up in neurology clinic, last appointment 9/2017.  -Continue keppra 500 BID    Symptoms of painful polyneuropathy noted on EMG 2014  -Nortriptyline 25 QHS          Pulmonary sarcoidosis    -Diagnosed 6  years ago per patient and she has required 5L home oxygen.  -Continue prednisone 10mg QD  -Fludrocortisone 0.1mg QD  -Duonebs q6h awake.            VTE Risk Mitigation         Ordered     warfarin (COUMADIN) tablet 5 mg  Daily     Route:  Oral        01/02/18 1146     Medium Risk of VTE  Once      01/02/18 1146     Place sequential compression device  Until discontinued      01/02/18 1146              Shanika Jung MD  Department of Hospital Medicine   [facility]

## 2018-01-04 NOTE — NURSING
Discharge instructions provided to pt. Reports understanding of teachings. SL to R hand dc'd. No bleeding noted at site. Dsg in place to site. Sitting at bedside. O2 in place at 5L/NC. Awaiting transportation.

## 2018-01-04 NOTE — SUBJECTIVE & OBJECTIVE
Interval History: NAEON. Pt denies any complaints at this time and states she feels much better after having HD yesterday. Breathing well on NC. Denies any fevers, chills, chest pain, SOB, abdominal pain, n/v/d. Plan for HD again today.       Review of Systems   Constitutional: Negative for activity change, appetite change, chills and fever.   Respiratory: Negative for cough and shortness of breath.         On home O2 NC   Cardiovascular: Negative for chest pain and palpitations.   Gastrointestinal: Negative for abdominal pain, diarrhea, nausea and vomiting.   Musculoskeletal: Negative for neck pain and neck stiffness.   Skin: Negative for rash and wound.   Neurological: Negative for dizziness and headaches.     Objective:     Vital Signs (Most Recent):  Temp: (P) 97.6 °F (36.4 °C) (01/03/18 1345)  Pulse: (P) 74 (01/03/18 1630)  Resp: (P) 18 (01/03/18 1630)  BP: (P) 114/81 (01/03/18 1630)  SpO2: 98 % (01/03/18 1437) Vital Signs (24h Range):  Temp:  [95.7 °F (35.4 °C)-98.9 °F (37.2 °C)] (P) 97.6 °F (36.4 °C)  Pulse:  [65-74] (P) 74  Resp:  [16-20] (P) 18  SpO2:  [96 %-100 %] 98 %  BP: (105-125)/(54-78) (P) 114/81     Weight: 129 kg (284 lb 6.4 oz)  Body mass index is 42 kg/m².    Intake/Output Summary (Last 24 hours) at 01/03/18 1830  Last data filed at 01/03/18 0500   Gross per 24 hour   Intake             1080 ml   Output             4600 ml   Net            -3520 ml      Physical Exam   Constitutional: She is oriented to person, place, and time. She appears well-developed and well-nourished. No distress.   Obese female   HENT:   Head: Normocephalic and atraumatic.   Eyes: EOM are normal. Pupils are equal, round, and reactive to light.   Neck: Normal range of motion. Neck supple.   Cardiovascular: Normal rate and regular rhythm.    Pulmonary/Chest: Effort normal. No respiratory distress. She has no wheezes. She has rales (few scattered at b/l bases).   NC in place   Abdominal: Soft. There is no tenderness. There is  no guarding.   Musculoskeletal: She exhibits no edema or tenderness.   Neurological: She is alert and oriented to person, place, and time.   Skin: Skin is warm and dry.   Psychiatric: She has a normal mood and affect. Her behavior is normal.       Significant Labs:   Recent Results (from the past 24 hour(s))   Basic Metabolic Panel (BMP)    Collection Time: 01/03/18  4:24 AM   Result Value Ref Range    Sodium 138 136 - 145 mmol/L    Potassium 4.9 3.5 - 5.1 mmol/L    Chloride 93 (L) 95 - 110 mmol/L    CO2 26 23 - 29 mmol/L    Glucose 55 (L) 70 - 110 mg/dL    BUN, Bld 37 (H) 6 - 20 mg/dL    Creatinine 7.3 (H) 0.5 - 1.4 mg/dL    Calcium 8.9 8.7 - 10.5 mg/dL    Anion Gap 19 (H) 8 - 16 mmol/L    eGFR if African American 6.6 (A) >60 mL/min/1.73 m^2    eGFR if non African American 5.7 (A) >60 mL/min/1.73 m^2   Magnesium    Collection Time: 01/03/18  4:24 AM   Result Value Ref Range    Magnesium 2.4 1.6 - 2.6 mg/dL   Phosphorus    Collection Time: 01/03/18  4:24 AM   Result Value Ref Range    Phosphorus 4.1 2.7 - 4.5 mg/dL   PT/INR    Collection Time: 01/03/18  4:24 AM   Result Value Ref Range    Prothrombin Time 21.3 (H) 9.0 - 12.5 sec    INR 2.1 (H) 0.8 - 1.2   CBC auto differential    Collection Time: 01/03/18  4:24 AM   Result Value Ref Range    WBC 8.31 3.90 - 12.70 K/uL    RBC 4.71 4.00 - 5.40 M/uL    Hemoglobin 13.6 12.0 - 16.0 g/dL    Hematocrit 43.0 37.0 - 48.5 %    MCV 91 82 - 98 fL    MCH 28.9 27.0 - 31.0 pg    MCHC 31.6 (L) 32.0 - 36.0 g/dL    RDW 14.3 11.5 - 14.5 %    Platelets 134 (L) 150 - 350 K/uL    MPV 12.2 9.2 - 12.9 fL    Immature Granulocytes 0.8 (H) 0.0 - 0.5 %    Gran # 6.1 1.8 - 7.7 K/uL    Immature Grans (Abs) 0.07 (H) 0.00 - 0.04 K/uL    Lymph # 1.0 1.0 - 4.8 K/uL    Mono # 1.0 0.3 - 1.0 K/uL    Eos # 0.1 0.0 - 0.5 K/uL    Baso # 0.03 0.00 - 0.20 K/uL    nRBC 0 0 /100 WBC    Gran% 73.6 (H) 38.0 - 73.0 %    Lymph% 12.2 (L) 18.0 - 48.0 %    Mono% 11.8 4.0 - 15.0 %    Eosinophil% 1.2 0.0 - 8.0 %     Basophil% 0.4 0.0 - 1.9 %    Platelet Estimate Appears normal     Aniso Slight     Differential Method Automated    POCT glucose    Collection Time: 01/03/18  2:25 PM   Result Value Ref Range    POCT Glucose 109 70 - 110 mg/dL   POCT glucose    Collection Time: 01/03/18  5:05 PM   Result Value Ref Range    POCT Glucose 126 (H) 70 - 110 mg/dL         Significant Imaging: No new imaging

## 2018-01-05 LAB
BACTERIA BLD CULT: NORMAL

## 2018-01-07 LAB — BACTERIA BLD CULT: NORMAL

## 2018-01-08 ENCOUNTER — TELEPHONE (OUTPATIENT)
Dept: ADMINISTRATIVE | Facility: HOSPITAL | Age: 58
End: 2018-01-08

## 2018-01-08 LAB
BACTERIA BLD CULT: NORMAL
BACTERIA BLD CULT: NORMAL

## 2018-01-08 NOTE — TELEPHONE ENCOUNTER
Please see message below from [facility] nurse-     Sisi Medel [id number] I was unable to reach her for post hospital follow up. I spoke with [facility] who reports patient was not admitted due to patient not meeting home bound status.       Thanks,        Angela Gaona, RN    RN Navigator    [facility]

## 2018-01-09 ENCOUNTER — TELEPHONE (OUTPATIENT)
Dept: VASCULAR SURGERY | Facility: CLINIC | Age: 58
End: 2018-01-09

## 2018-01-09 DIAGNOSIS — T82.9XXD COMPLICATION OF ARTERIOVENOUS DIALYSIS FISTULA, SUBSEQUENT ENCOUNTER: Primary | ICD-10-CM

## 2018-01-10 ENCOUNTER — ANESTHESIA EVENT (OUTPATIENT)
Dept: SURGERY | Facility: HOSPITAL | Age: 58
End: 2018-01-10
Payer: MEDICARE

## 2018-01-10 ENCOUNTER — ANESTHESIA (OUTPATIENT)
Dept: SURGERY | Facility: HOSPITAL | Age: 58
End: 2018-01-10
Payer: MEDICARE

## 2018-01-10 ENCOUNTER — HOSPITAL ENCOUNTER (OUTPATIENT)
Facility: HOSPITAL | Age: 58
Discharge: HOME | End: 2018-01-10
Attending: SURGERY | Admitting: SURGERY
Payer: MEDICARE

## 2018-01-10 VITALS
SYSTOLIC BLOOD PRESSURE: 128 MMHG | RESPIRATION RATE: 19 BRPM | DIASTOLIC BLOOD PRESSURE: 79 MMHG | OXYGEN SATURATION: 100 % | WEIGHT: 266.75 LBS | HEART RATE: 70 BPM | TEMPERATURE: 98 F | HEIGHT: 68 IN | BODY MASS INDEX: 40.43 KG/M2

## 2018-01-10 DIAGNOSIS — N18.6 ESRD (END STAGE RENAL DISEASE) ON DIALYSIS: ICD-10-CM

## 2018-01-10 DIAGNOSIS — Z99.2 ESRD (END STAGE RENAL DISEASE) ON DIALYSIS: ICD-10-CM

## 2018-01-10 DIAGNOSIS — T82.868A AV FISTULA THROMBOSIS: Primary | ICD-10-CM

## 2018-01-10 DIAGNOSIS — T82.9XXD COMPLICATION OF ARTERIOVENOUS DIALYSIS FISTULA, SUBSEQUENT ENCOUNTER: Primary | ICD-10-CM

## 2018-01-10 LAB
POCT GLUCOSE: 102 MG/DL (ref 70–110)
POCT GLUCOSE: 96 MG/DL (ref 70–110)

## 2018-01-10 PROCEDURE — 63600175 PHARM REV CODE 636 W HCPCS: Performed by: NURSE ANESTHETIST, CERTIFIED REGISTERED

## 2018-01-10 PROCEDURE — 82962 GLUCOSE BLOOD TEST: CPT | Performed by: SURGERY

## 2018-01-10 PROCEDURE — 36000707: Performed by: SURGERY

## 2018-01-10 PROCEDURE — C1894 INTRO/SHEATH, NON-LASER: HCPCS | Performed by: SURGERY

## 2018-01-10 PROCEDURE — D9220A PRA ANESTHESIA: Mod: ANES,,, | Performed by: ANESTHESIOLOGY

## 2018-01-10 PROCEDURE — 25500020 PHARM REV CODE 255: Performed by: SURGERY

## 2018-01-10 PROCEDURE — C1725 CATH, TRANSLUMIN NON-LASER: HCPCS | Performed by: SURGERY

## 2018-01-10 PROCEDURE — 37000009 HC ANESTHESIA EA ADD 15 MINS: Performed by: SURGERY

## 2018-01-10 PROCEDURE — 25000003 PHARM REV CODE 250: Performed by: SURGERY

## 2018-01-10 PROCEDURE — 71000015 HC POSTOP RECOV 1ST HR: Performed by: SURGERY

## 2018-01-10 PROCEDURE — 37000008 HC ANESTHESIA 1ST 15 MINUTES: Performed by: SURGERY

## 2018-01-10 PROCEDURE — 63600175 PHARM REV CODE 636 W HCPCS: Performed by: SURGERY

## 2018-01-10 PROCEDURE — 25000003 PHARM REV CODE 250: Performed by: NURSE ANESTHETIST, CERTIFIED REGISTERED

## 2018-01-10 PROCEDURE — 27201423 OPTIME MED/SURG SUP & DEVICES STERILE SUPPLY: Performed by: SURGERY

## 2018-01-10 PROCEDURE — 36000706: Performed by: SURGERY

## 2018-01-10 PROCEDURE — C1769 GUIDE WIRE: HCPCS | Performed by: SURGERY

## 2018-01-10 PROCEDURE — D9220A PRA ANESTHESIA: Mod: CRNA,,, | Performed by: NURSE ANESTHETIST, CERTIFIED REGISTERED

## 2018-01-10 PROCEDURE — 36902 INTRO CATH DIALYSIS CIRCUIT: CPT | Mod: ,,, | Performed by: SURGERY

## 2018-01-10 RX ORDER — FENTANYL CITRATE 50 UG/ML
INJECTION, SOLUTION INTRAMUSCULAR; INTRAVENOUS
Status: DISCONTINUED | OUTPATIENT
Start: 2018-01-10 | End: 2018-01-10

## 2018-01-10 RX ORDER — LIDOCAINE HCL/PF 100 MG/5ML
SYRINGE (ML) INTRAVENOUS
Status: DISCONTINUED | OUTPATIENT
Start: 2018-01-10 | End: 2018-01-10

## 2018-01-10 RX ORDER — IODIXANOL 320 MG/ML
INJECTION, SOLUTION INTRAVASCULAR
Status: DISCONTINUED | OUTPATIENT
Start: 2018-01-10 | End: 2018-01-10 | Stop reason: HOSPADM

## 2018-01-10 RX ORDER — PROPOFOL 10 MG/ML
VIAL (ML) INTRAVENOUS CONTINUOUS PRN
Status: DISCONTINUED | OUTPATIENT
Start: 2018-01-10 | End: 2018-01-10

## 2018-01-10 RX ORDER — HYDROCODONE BITARTRATE AND ACETAMINOPHEN 5; 325 MG/1; MG/1
1 TABLET ORAL EVERY 4 HOURS PRN
Status: DISCONTINUED | OUTPATIENT
Start: 2018-01-10 | End: 2018-01-10 | Stop reason: HOSPADM

## 2018-01-10 RX ORDER — MIDAZOLAM HYDROCHLORIDE 1 MG/ML
INJECTION, SOLUTION INTRAMUSCULAR; INTRAVENOUS
Status: DISCONTINUED | OUTPATIENT
Start: 2018-01-10 | End: 2018-01-10

## 2018-01-10 RX ORDER — ONDANSETRON 2 MG/ML
4 INJECTION INTRAMUSCULAR; INTRAVENOUS EVERY 12 HOURS PRN
Status: DISCONTINUED | OUTPATIENT
Start: 2018-01-10 | End: 2018-01-10 | Stop reason: HOSPADM

## 2018-01-10 RX ORDER — LIDOCAINE HYDROCHLORIDE 10 MG/ML
1 INJECTION, SOLUTION EPIDURAL; INFILTRATION; INTRACAUDAL; PERINEURAL ONCE
Status: DISCONTINUED | OUTPATIENT
Start: 2018-01-10 | End: 2018-01-10 | Stop reason: HOSPADM

## 2018-01-10 RX ORDER — VASOPRESSIN 20 [USP'U]/ML
INJECTION, SOLUTION INTRAMUSCULAR; SUBCUTANEOUS
Status: DISCONTINUED | OUTPATIENT
Start: 2018-01-10 | End: 2018-01-10

## 2018-01-10 RX ORDER — SODIUM CHLORIDE 0.9 % (FLUSH) 0.9 %
3 SYRINGE (ML) INJECTION
Status: DISCONTINUED | OUTPATIENT
Start: 2018-01-10 | End: 2018-01-10 | Stop reason: HOSPADM

## 2018-01-10 RX ORDER — MUPIROCIN 20 MG/G
OINTMENT TOPICAL
Status: DISCONTINUED | OUTPATIENT
Start: 2018-01-10 | End: 2018-01-10 | Stop reason: HOSPADM

## 2018-01-10 RX ORDER — CEFAZOLIN SODIUM 1 G/3ML
2 INJECTION, POWDER, FOR SOLUTION INTRAMUSCULAR; INTRAVENOUS
Status: DISCONTINUED | OUTPATIENT
Start: 2018-01-10 | End: 2018-01-10 | Stop reason: HOSPADM

## 2018-01-10 RX ORDER — HEPARIN SODIUM 1000 [USP'U]/ML
INJECTION, SOLUTION INTRAVENOUS; SUBCUTANEOUS
Status: DISCONTINUED | OUTPATIENT
Start: 2018-01-10 | End: 2018-01-10 | Stop reason: HOSPADM

## 2018-01-10 RX ORDER — PROPOFOL 10 MG/ML
VIAL (ML) INTRAVENOUS
Status: DISCONTINUED | OUTPATIENT
Start: 2018-01-10 | End: 2018-01-10

## 2018-01-10 RX ORDER — HYDRALAZINE HYDROCHLORIDE 20 MG/ML
INJECTION INTRAMUSCULAR; INTRAVENOUS
Status: DISCONTINUED | OUTPATIENT
Start: 2018-01-10 | End: 2018-01-10

## 2018-01-10 RX ORDER — SODIUM CHLORIDE 9 MG/ML
INJECTION, SOLUTION INTRAVENOUS CONTINUOUS PRN
Status: DISCONTINUED | OUTPATIENT
Start: 2018-01-10 | End: 2018-01-10

## 2018-01-10 RX ORDER — SODIUM CHLORIDE 9 MG/ML
INJECTION, SOLUTION INTRAVENOUS CONTINUOUS
Status: DISCONTINUED | OUTPATIENT
Start: 2018-01-10 | End: 2018-01-10 | Stop reason: HOSPADM

## 2018-01-10 RX ADMIN — FENTANYL CITRATE 25 MCG: 50 INJECTION, SOLUTION INTRAMUSCULAR; INTRAVENOUS at 04:01

## 2018-01-10 RX ADMIN — SODIUM CHLORIDE: 0.9 INJECTION, SOLUTION INTRAVENOUS at 03:01

## 2018-01-10 RX ADMIN — PROPOFOL 20 MG: 10 INJECTION, EMULSION INTRAVENOUS at 04:01

## 2018-01-10 RX ADMIN — MIDAZOLAM HYDROCHLORIDE 1 MG: 1 INJECTION, SOLUTION INTRAMUSCULAR; INTRAVENOUS at 04:01

## 2018-01-10 RX ADMIN — PROPOFOL 50 MCG/KG/MIN: 10 INJECTION, EMULSION INTRAVENOUS at 04:01

## 2018-01-10 RX ADMIN — VASOPRESSIN 1 UNITS: 20 INJECTION INTRAVENOUS at 04:01

## 2018-01-10 RX ADMIN — MIDAZOLAM HYDROCHLORIDE 2 MG: 1 INJECTION, SOLUTION INTRAMUSCULAR; INTRAVENOUS at 03:01

## 2018-01-10 RX ADMIN — PROPOFOL 50 MG: 10 INJECTION, EMULSION INTRAVENOUS at 04:01

## 2018-01-10 RX ADMIN — LIDOCAINE HYDROCHLORIDE 50 MG: 20 INJECTION, SOLUTION INTRAVENOUS at 04:01

## 2018-01-10 RX ADMIN — SODIUM CHLORIDE: 900 INJECTION, SOLUTION INTRAVENOUS at 02:01

## 2018-01-10 RX ADMIN — MUPIROCIN: 20 OINTMENT TOPICAL at 03:01

## 2018-01-10 RX ADMIN — HYDRALAZINE HYDROCHLORIDE 2.5 MG: 20 INJECTION INTRAMUSCULAR; INTRAVENOUS at 04:01

## 2018-01-10 NOTE — INTERVAL H&P NOTE
The patient has been examined and the H&P has been reviewed:    I concur with the findings and no changes have occurred since H&P was written.   To OR for LUE fistulogram with possible intervention   Patient has continued on coumadin     Anesthesia/Surgery risks, benefits and alternative options discussed and understood by patient/family.          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

## 2018-01-10 NOTE — DISCHARGE INSTRUCTIONS
Arteriovenous (AV) Fistula for Dialysis  An AV fistula is a connection between an artery and a vein. For this procedure, an AV fistula is surgically created using an artery and a vein in your arm. (Your healthcare provider will let you know if another site is to be used.) When the artery and vein are joined, blood flow increases from the artery into the vein. As a result, the vein gets bigger over time. The enlarged vein provides easier access to the blood for a treatment for kidney failure (dialysis). This sheet explains the procedure and what to expect.     An AV fistula increases blood flow from the artery into the vein. Over time, the vein becomes stronger and enlarged.   Preparing for the procedure  Prepare as you have been told. In addition:  · Tell your healthcare provider about all the medicines you take. This includes all over-the-counter and prescription medicines, and street drugs. It also includes herbs, vitamins, and other supplements. You may need to stop taking some or all of them before the procedure.  · Follow any directions youre given for not eating or drinking before the procedure.  · Do not allow anyone to draw blood from or take blood pressure on the arm that will have the fistula before the procedure.  The day of the procedure  The procedure takes about 1 to 2 hours. Youll likely go home the same day.  Before the procedure begins:  · An IV (intravenous) line is put into a vein in the arm or hand not being used for the procedure. This line supplies fluids and medicines.  · To keep you free of pain during the procedure, youre given general anesthesia. This medicine puts you into a state like a deep sleep through the procedure. Or a nerve block may be used. This medicine numbs the arm. With it, you may also be given medicine that makes you relaxed and drowsy through the procedure.  During the procedure:  · The skin over your arm may be injected with numbing medicine.  · One or more small  cuts (incisions) are then made through the numbed skin. This depends on the size of your arm and the depth of the vein in your arm.  · The vein is attached to the selected artery.  · Any incisions made are then closed with stitches (sutures), staples, surgical glue, or strips of surgical tape.  After the procedure:  · Youll be asked to keep your arm raised (elevated) as often as possible for at least a week after the procedure.  · Youll be given medicines to manage pain as needed.  · Your arm and hand will be checked to make sure blood is flowing through the fistula properly. The feeling of blood rushing through the fistula is called a thrill. It is somewhat similar to the purring of a cat. Youll be taught how to check for this feeling each day to make sure there are no problems with your fistula. Youll also be taught how to care for your fistula at home.  · When its time for you to leave the hospital, have an adult family member or friend ready to drive you home.  Recovering at home  Once at home, follow all of the instructions youve been given. Be sure to:  · Take all medicines as directed.  · Care for your incision as instructed.  · Check for signs of infection at the incision site (see below).  · Avoid heavy lifting and strenuous activities as directed.  · Monitor and care for your fistula as instructed.  · Do your hand and arm exercises as instructed. This usually involves squeezing a ball in your hand for a few minutes each hour.  Call your healthcare provider if you have any of the following:  · Fever of 100.4°F (38°C) or higher  · Signs of infection at the incision site, such as increased redness or swelling, warmth, worsening pain, bleeding, or bad-smelling drainage  · You cant feel a thrill (the vibration of blood going through your arm)  · Pain or numbness in your fingers, hand, or arm  · Bleeding, redness, or warmth around your fistula  · Sudden bulging of the fistula (more than usual; a slight  bulge is normal)   Follow-Up  Your healthcare provider will check your fistula within 1 to 2 weeks after the procedure. It will likely take about 6 to 8 weeks for the fistula to enlarge enough to start dialysis. After that, make sure the fistula is checked each time you have dialysis. Your healthcare provider may also suggest checkups every 6 months.  Risks and possible complications include:  · The fistula not working properly  · Long wait before the fistula is ready (up to 6 months)  · Coldness or numbness in the hand (due to blood flowing away from the hand and into the fistula)  · An unsightly bump under the skin (due to enlargement of the fistula)  · Prolonged bleeding from the fistula after dialysis  · Narrowing or weakening of the blood vessels used for the fistula  · Formation of blood clots in the blood vessels used for the fistula  · Risks of anesthesia or any other medicines used during the procedure   Living with an AV Fistula  A problem, such as a narrowing (stricture) of the vein or an infection, can make the fistula unusable. If this happens, you may need other treatments to repair or make a new fistula. To protect your fistula, follow these and any other guidelines youre given:  · Check your fistula as often as your healthcare provider says. If you cant feel your thrill, let your provider know right away.  · Make sure your fistula is checked before each dialysis treatment.  · Dont let anyone draw blood from or take blood pressure on the arm that has the fistula.  · Wash your hands often and keep the area around your fistula clean.  · Dont sleep on the arm that has the fistula.  · Dont wear tight jewelry or a watch on the arm with your fistula.  · Protect your fistula from cuts, scrapes, or blows.  Date Last Reviewed: 1/1/2017  © 0865-1759 [employer]. [address] [place] [address], [place] [address]. All rights reserved. This information is not intended as a substitute for professional  medical care. Always follow your healthcare professional's instructions.

## 2018-01-10 NOTE — OP NOTE
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  Torrey Villafana MD  Vascular & Endovascular Surgery     Discharge Note  SUMMARY    Admit Date: 1/10/2018    Attending Physician:Torrey Villafana MD  Vascular & Endovascular Surgery       Discharge Physician: Torrey Villafana MD  Vascular & Endovascular Surgery       Discharge Date: 01/10/2018    Final Diagnosis: Complication of arteriovenous dialysis fistula, subsequent encounter [T82.9XXD]    Disposition: Home or self-care    Patient Instructions:   Current Discharge Medication List      CONTINUE these medications which have NOT CHANGED    Details   ACZONE 5 % topical gel Apply to face every morning  Refills: 2      atorvastatin (LIPITOR) 80 MG tablet TAKE 1 TABLET(80 MG) BY MOUTH EVERY NIGHT  Qty: 90 tablet, Refills: 3    Associated Diagnoses: Chronic combined systolic and diastolic heart failure      cholecalciferol, vitamin D3, 1,000 unit capsule Take 1 capsule (1,000 Units total) by mouth once daily.  Refills: 0      clopidogrel (PLAVIX) 75 mg tablet 75 mg.      digoxin (LANOXIN) 125 mcg tablet Take 1 tablet (0.125 mg total) by mouth every other day.  Qty: 15 tablet, Refills: 1      fludrocortisone (FLORINEF) 0.1 mg Tab Take 100 mcg by mouth 2 (two) times daily.      ketorolac 0.5% (ACULAR) 0.5 % Drop instill one drop into  both eyes every morning  Refills: 3      levETIRAcetam (KEPPRA) 500 MG Tab Take 1 tablet (500 mg total) by mouth 2 (two) times daily.  Qty: 180 tablet, Refills: 3    Associated Diagnoses: Seizure disorder      midodrine (PROAMATINE) 5 MG Tab Take 2 tablets (10 mg total) by mouth every Mon, Wed, Fri, Sun.  Qty: 32 tablet, Refills: 11      omeprazole (PRILOSEC) 20 MG capsule TAKE 1 CAPSULE(20 MG) BY MOUTH EVERY DAY  Qty: 90 capsule, Refills: 3    Comments: **Patient requests 90 days supply**  Associated Diagnoses: Gastroesophageal reflux disease with esophagitis      prednisoLONE acetate (PRED FORTE) 1 % DrpS INSTILL ONE DROP INTO  LEFT EYE THREE TIMES A DAY  Refills: 3      predniSONE (DELTASONE) 10 MG tablet TAKE 1 TABLET BY MOUTH EVERY DAY WITH BREAKFAST  Qty: 90 tablet, Refills: 0    Associated Diagnoses: Sarcoidosis of lung      RENVELA 800 mg Tab TAKE 1 TABLET BY MOUTH THREE TIMES DAILY WITH MEALS  Qty: 90 tablet, Refills: 0      SENSIPAR 30 mg Tab       tizanidine 4 mg Cap TAKE 1 CAPSULE BY MOUTH DAILY AS NEEDED FOR MUSCLE SPASM  Qty: 90 capsule, Refills: 0    Comments: **Patient requests 90 days supply**  Associated Diagnoses: Muscle spasm      warfarin (COUMADIN) 5 MG tablet Take 1 tablet (5 mg total) by mouth Daily. Per [facility]  Qty: 90 tablet, Refills: 3    Comments: **Patient requests 90 days supply**  Associated Diagnoses: Long-term (current) use of anticoagulants; Paroxysmal atrial fibrillation      ammonium lactate (LAC-HYDRIN) 12 % lotion Apply topically as needed (to legs).   Refills: 2      diclofenac sodium (SOLARAZE) 3 % gel       doxepin (ZONALON) 5 % cream       econazole nitrate 1 % cream APPLY TOPICALLY TWICE DAILY AS NEEDED FOR FUNGAL SKIN INFECTIONS. USE FOR 2 TO 4 WEEKS  Qty: 30 g, Refills: 0      nortriptyline (PAMELOR) 25 MG capsule Take 1 capsule (25 mg total) by mouth every evening.  Qty: 30 capsule, Refills: 11    Associated Diagnoses: Paresthesias      polyethylene glycol  (GLYCOLAX) 17 gram/dose powder Take 17 g by mouth once daily. Dissolve in juice.  Qty: 510 g, Refills: 0             Diet:  Resume pre-operative diet    Activity:  Ad rosa    Follow-up:  Follow-up in clinic with Dr Villafana within 1-2 weeks; please call clinic nurse at

## 2018-01-10 NOTE — DISCHARGE SUMMARY
Discharge Note    SUMMARY     Admit Date: 1/10/2018    Discharge Date and Time:  01/10/2018 5:01 PM    Hospital Course (synopsis of major diagnoses, care, treatment, and services provided during the course of the hospital stay): Uneventful procedure. Discharged home when awake and alert.      Final Diagnosis: Post-Op Diagnosis Codes:     * Complication of arteriovenous dialysis fistula, subsequent encounter [T82.9XXD]    Disposition: Home or Self Care    Follow Up/Patient Instructions:     Medications:  Reconciled Home Medications:   Current Discharge Medication List      CONTINUE these medications which have NOT CHANGED    Details   ACZONE 5 % topical gel Apply to face every morning  Refills: 2      atorvastatin (LIPITOR) 80 MG tablet TAKE 1 TABLET(80 MG) BY MOUTH EVERY NIGHT  Qty: 90 tablet, Refills: 3    Associated Diagnoses: Chronic combined systolic and diastolic heart failure      cholecalciferol, vitamin D3, 1,000 unit capsule Take 1 capsule (1,000 Units total) by mouth once daily.  Refills: 0      clopidogrel (PLAVIX) 75 mg tablet 75 mg.      digoxin (LANOXIN) 125 mcg tablet Take 1 tablet (0.125 mg total) by mouth every other day.  Qty: 15 tablet, Refills: 1      fludrocortisone (FLORINEF) 0.1 mg Tab Take 100 mcg by mouth 2 (two) times daily.      ketorolac 0.5% (ACULAR) 0.5 % Drop instill one drop into both eyes every morning  Refills: 3      levETIRAcetam (KEPPRA) 500 MG Tab Take 1 tablet (500 mg total) by mouth 2 (two) times daily.  Qty: 180 tablet, Refills: 3    Associated Diagnoses: Seizure disorder      midodrine (PROAMATINE) 5 MG Tab Take 2 tablets (10 mg total) by mouth every Mon, Wed, Fri, Sun.  Qty: 32 tablet, Refills: 11      omeprazole (PRILOSEC) 20 MG capsule TAKE 1 CAPSULE(20 MG) BY MOUTH EVERY DAY  Qty: 90 capsule, Refills: 3    Comments: **Patient requests 90 days supply**  Associated Diagnoses: Gastroesophageal reflux disease with esophagitis      prednisoLONE acetate (PRED FORTE) 1 % DrpS  INSTILL ONE DROP INTO  LEFT EYE THREE TIMES A DAY  Refills: 3      predniSONE (DELTASONE) 10 MG tablet TAKE 1 TABLET BY MOUTH EVERY DAY WITH BREAKFAST  Qty: 90 tablet, Refills: 0    Associated Diagnoses: Sarcoidosis of lung      RENVELA 800 mg Tab TAKE 1 TABLET BY MOUTH THREE TIMES DAILY WITH MEALS  Qty: 90 tablet, Refills: 0      SENSIPAR 30 mg Tab       tizanidine 4 mg Cap TAKE 1 CAPSULE BY MOUTH DAILY AS NEEDED FOR MUSCLE SPASM  Qty: 90 capsule, Refills: 0    Comments: **Patient requests 90 days supply**  Associated Diagnoses: Muscle spasm      warfarin (COUMADIN) 5 MG tablet Take 1 tablet (5 mg total) by mouth Daily. Per [facility]  Qty: 90 tablet, Refills: 3    Comments: **Patient requests 90 days supply**  Associated Diagnoses: Long-term (current) use of anticoagulants; Paroxysmal atrial fibrillation      ammonium lactate (LAC-HYDRIN) 12 % lotion Apply topically as needed (to legs).   Refills: 2      diclofenac sodium (SOLARAZE) 3 % gel       doxepin (ZONALON) 5 % cream       econazole nitrate 1 % cream APPLY TOPICALLY TWICE DAILY AS NEEDED FOR FUNGAL SKIN INFECTIONS. USE FOR 2 TO 4 WEEKS  Qty: 30 g, Refills: 0      nortriptyline (PAMELOR) 25 MG capsule Take 1 capsule (25 mg total) by mouth every evening.  Qty: 30 capsule, Refills: 11    Associated Diagnoses: Paresthesias      polyethylene glycol (GLYCOLAX) 17 gram/dose powder Take 17 g by mouth once daily. Dissolve in juice.  Qty: 510 g, Refills: 0             Discharge Procedure Orders  VAS  Hemodialysis Access   Standing Status: Future  Standing Exp. Date: 02/02/18     Diet general     Activity as tolerated     Call MD for:  extreme fatigue     Call MD for:  persistent dizziness or light-headedness     Call MD for:  hives     Call MD for:  redness, tenderness, or signs of infection (pain, swelling, redness, odor or green/yellow discharge around incision site)     Call MD for:  difficulty breathing, headache or visual disturbances     Call MD for:   severe uncontrolled pain     Call MD for:  persistent nausea and vomiting     Call MD for:  temperature >100.4     Remove dressing in 48 hours       Follow-up Information     Torrey Villafana MD In 3 weeks.    Specialty:  Vascular Surgery  Why:  with dialysis US   Contact information:  Brii [address]DERON  [facility] [id number] [phone]

## 2018-01-10 NOTE — TELEPHONE ENCOUNTER
Patient is currently admitted to inpatient; inpatient team should assess need for home health services on discharge.

## 2018-01-10 NOTE — TRANSFER OF CARE
"Anesthesia Transfer of Care Note    Patient: Sisi Medel    Procedure(s) Performed: Procedure(s) (LRB):  fistulogram (Left)  ANGIOPLASTY (Left)    Patient location: PACU    Anesthesia Type: MAC    Transport from OR: Transported from OR on 6-10 L/min O2 by face mask with adequate spontaneous ventilation    Post pain: adequate analgesia    Post assessment: no apparent anesthetic complications    Post vital signs: stable    Level of consciousness: awake    Nausea/Vomiting: no nausea/vomiting    Complications: none    Transfer of care protocol was followed      Last vitals:   Visit Vitals  /85   Pulse 72   Temp 37.3 °C (99.2 °F) (Oral)   Resp (!) 22   Ht 5' 8" (1.727 m)   Wt 121 kg (266 lb 12.1 oz)   LMP  (LMP Unknown)   SpO2 100%   BMI 40.56 kg/m²     "

## 2018-01-10 NOTE — PROGRESS NOTES
1330-Notified June in pacemaker clinic about pt's pacemaker. She stated it does not need to be reprogrammed.

## 2018-01-10 NOTE — PLAN OF CARE
1408-Pt denies any caps, crowns, dentures, jewelry or body piercings. Pt denies any contacts or hearing aids.

## 2018-01-10 NOTE — PLAN OF CARE
Pt dressed, waiting at bedside for physician to speak with her and her family regarding procedure.  Will c/t monitor.

## 2018-01-10 NOTE — H&P (VIEW-ONLY)
Sisi Medel  12/11/2017    HPI:  Patient is a 55 y.o.  female with a h/o HTN, HLD, CHF (EF 20%), COPD on Home O2, Paroxysmal Afib (On Coumadin anticoagulation), Seizure disorder, ESRD on HD via LUE AVG who is here for evaluation of her LUE upper arm AV graft (placed by 2/3/16). She had recent PTA of the graft (brachial vein) x2: (7x60 [place]); (8x40 [place]) on 10/12/17. The AV graft was working well until a few weeks ago when she reports that her arterial access site began to have increased pressure. She does dialysis T/Th/Sat. Otherwise patient is doing well with no complaints.    S/p   2/3/16: LUE AVG creation (Dr Villafana)  6/21/16: Percutaneous mechanical thrombectomy w [place] Angiojet AVX; 4fr OTW embolectomy of arterial inflow   PTA outflow stenosis with a 7x40 mm balloon  10/12/16: fistulogram (75% stenosis noted), left upper extremity AV graft PTA venous outflow with resolution of stenosis noted  2/2017 Declot and venous outflow PTA and stent with 8 x 50 VIABAHN  5/15/2017: PTA outflow stenosis (8x60 mustang); Stent outflow stenosis (8x50 [place]ahn)  10/12/17: PTA LUE AV graft (brachial vein) x2: (7x60 [place]); (8x40 [place])     Findings/Key Components:   Good palpable thrill      MI/stroke  Tobacco use: Former smoker     Past Medical History   Diagnosis Date    Anemia in ESRD (end-stage renal disease) 3/7/2016    Cervical radiculopathy 7/20/2015    Chronic combined systolic and diastolic heart failure 6/14/2013     EF 20% 2013, improved with Medical therapy, negative PET 2013    Chronic respiratory failure with hypoxia 04/22/2013     On home oxygen at 2-5 liters    Chronic rhinitis 10/2/2013    DDD (degenerative disc disease), lumbar 6/17/2015    ESRD on hemodialysis 2/23/2016    Essential hypertension     Gout     Hyperlipidemia 3/7/2016    Lateral meniscal tear 5/31/2016    Lumbar stenosis 6/17/2015    Morbid obesity 8/15/2013    Paroxysmal atrial fibrillation  2014     Not on anticoagulation    Peripheral neuropathy 2013    Personal history of fall 2014    Personal history of gastric ulcer 3/19/2013    Sarcoidosis, lung 2009     Diagnosed in  with ocular manifestation, on 4L home O2 and PO steroids    Secondary pulmonary hypertension 2015    Seizure disorder 3/19/2013    Shingles 2013    Spondylarthrosis 2015     Past Surgical History   Procedure Laterality Date     section, classic      Abdominal surgery      Vascular surgery       Family History   Problem Relation Age of Onset    Kidney failure Mother     Hypertension Mother     Diabetes Mother     Coronary artery disease Father     Hypertension Father     Diabetes Father     Lupus Sister     Diabetes Maternal Grandmother     Asthma Neg Hx     Emphysema Neg Hx     Melanoma Neg Hx     Psoriasis Neg Hx      Social History     Social History    Marital status: Single     Spouse name: N/A    Number of children: N/A    Years of education: N/A     Occupational History    Not on file.     Social History Main Topics    Smoking status: Former Smoker     Packs/day: 0.50     Years: 10.00     Types: Cigarettes     Quit date: 1994    Smokeless tobacco: Never Used    Alcohol use No    Drug use: No    Sexual activity: No     Other Topics Concern    Not on file     Social History Narrative    Lives with boyfriend/partner who helps with her care.     Plans to travel to [place] for 2 weeks in 2016     Current Outpatient Prescriptions on File Prior to Visit   Medication Sig    ACZONE 5 % topical gel Apply topically once daily.     allopurinol (ZYLOPRIM) 100 MG tablet Take 1 tablet (100 mg total) by mouth once daily.    amiodarone (PACERONE) 200 MG Tab Take 1 tablet (200 mg total) by mouth every morning.    ammonium lactate (LAC-HYDRIN) 12 % lotion Apply topically as needed (for scars on arms).     aspirin 81 MG Chew Take 81 mg by mouth every morning.     atorvastatin (LIPITOR) 80 MG tablet Take 80 mg by mouth once daily.     capsicum 0.075% (ZOSTRIX) 0.075 % topical cream Apply topically 3 (three) times daily. For neuropathic pain of feet    gabapentin (NEURONTIN) 100 MG capsule TAKE ONE CAPSULE BY MOUTH THREE TIMES DAILY (Patient taking differently: TAKE ONE CAPSULE BY MOUTH THREE TIMES DAILY-noon, evening, bedtime)    levetiracetam (KEPPRA) 500 MG Tab TAKE 1 TABLET BY MOUTH TWICE DAILY.    melatonin 5 mg Tab Take 1 tablet by mouth nightly.    midodrine (PROAMATINE) 10 MG tablet     midodrine (PROAMATINE) 5 MG Tab     NEPHRO-LINDA 0.8 mg Tab TK 1 T PO  QD    ondansetron (ZOFRAN-ODT) 8 MG TbDL Take 1 tablet (8 mg total) by mouth every 8 (eight) hours as needed.    oxycodone-acetaminophen (PERCOCET) 7.5-325 mg per tablet Take 1 tablet by mouth every 4 (four) hours as needed.    predniSONE (DELTASONE) 10 MG tablet TAKE 3 TABLETS BY MOUTH FOR 7 DAYS, THEN 2 TABLETS FOR 7 DAYS, THEN 1 TABLET EVERY DAY AFTER.    RENVELA 800 mg Tab TAKE 1 TABLET BY MOUTH THREE TIMES DAILY WITH MEALS    tizanidine 4 mg Cap Take 4 mg by mouth 2 (two) times daily as needed. (Patient taking differently: Take 4 mg by mouth daily as needed (for muscle spasms.). )    warfarin (COUMADIN) 5 MG tablet TAKE 1 TABLET(5 MG) BY MOUTH DAILY     No current facility-administered medications on file prior to visit.        REVIEW OF SYSTEMS:  General: negative; ENT: negative; Allergy and Immunology: negative; Hematological and Lymphatic: Negative; Endocrine: negative; Respiratory: no cough, shortness of breath, or wheezing; Cardiovascular: no chest pain or dyspnea on exertion; Gastrointestinal: no abdominal pain/back, change in bowel habits, or bloody stools; Genito-Urinary: no dysuria, trouble voiding, or hematuria; Musculoskeletal: no pain, numbness, to LUE  Neurological: no TIA or stroke symptoms; Psychiatric: no nervousness, anxiety or depression.    PHYSICAL EXAM:   Vitals:    07/14/16 1532    BP: (!) 81/57   Pulse: (!) 52   Temp: 98.9 °F (37.2 °C)       General appearance:  Alert, well-appearing, and in no distress.  Oriented to person, place, and time   Neurological:  Normal speech, no focal findings noted; CN II - XII grossly intact           Musculoskeletal: Digits/nail without cyanosis/clubbing.  Normal muscle strength/tone.                 Neck: Supple, no significant adenopathy; thyroid is not enlarged                Chest:  Clear to auscultation, symmetric air entry      No use of accessory muscles             Cardiac: Normal rate and regular rhythm, S1 and S2 normal; PMI non-displaced          Abdomen: Soft, nontender, nondistended, no masses or organomegaly      no rebound tenderness noted      Extremities:   LUE AVG with palpable thrill, no increased pulsatility, 2+ distal radial pulse      LAB RESULTS:  Lab Results   Component Value Date    K 4.5 07/12/2016    K 4.6 07/04/2016    K 5.1 07/03/2016    CREATININE 6.5 (H) 07/12/2016    CREATININE 10.2 (H) 07/04/2016    CREATININE 7.6 (H) 07/03/2016     Lab Results   Component Value Date    WBC 11.09 07/12/2016    WBC 10.38 07/04/2016    WBC 11.01 07/03/2016    HCT 37.5 07/12/2016    HCT 31.4 (L) 07/04/2016    HCT 30.1 (L) 07/03/2016     07/12/2016     07/04/2016     07/03/2016     Lab Results   Component Value Date    HGBA1C 5.7 05/17/2016    HGBA1C 6.7 (H) 10/15/2015    HGBA1C 5.5 06/15/2015     IMAGING:  Inflow and arterial anastomosis velocities maintained.  Venous outflow velocities elevated.   Volume flow maintained     IMP/PLAN:  55 y.o. female with HTN, HLD, CHF (EF 20%), COPD on Home O2, Paroxysmal Afib (On Coumadin anticoagulation), Seizure disorder, ESRD on HD via LUE AVG who is here today for evaluation of her LUE upper arm AV graft (placed by Dr. Villafana 2/3/16). Increased arterial pressure during dialysis.  Duplex today reveals elevated venous outflow velocities from previous study.  Given her history of AV  graft thrombosis and multiple interventions, will plan for a fistulogram with possible intervention and reflux angiogram to examine arterial anastomosis.     1) LUE fistulagram 12/15/17   2) continue coumadin and all medications  3) Stitch removed from previous intervention     Torrey Villafana MD  Vascular & Endovascular Surgery

## 2018-01-10 NOTE — PROGRESS NOTES
1420-Iv attempted times two by CATHRYN [facility] Lpn. Dr. Baez notified that iv access was not obtained with multiple attempts. She verbalized understanding.

## 2018-01-10 NOTE — PROGRESS NOTES
Spoke to Md.  Family would like to speak to physician regarding procedure. MD states she will come to bedside prior to d/c home.

## 2018-01-10 NOTE — ANESTHESIA PREPROCEDURE EVALUATION
Pre-operative evaluation for Procedure(s) (LRB):  Fistulogram  with possible declot (Left)    Sisi Medel is a 57 y.o. female ESRD (T/Th/Sat), HFrEF(20%), pt with pace maker, pulmonary sarcoidosis, afib, copd on home 02, seizure disorder, DM2.      Per nursing - spoke with cardiology and nothing to do with the pacemaker.     Patient Active Problem List   Diagnosis    Pulmonary sarcoidosis    Seizure disorder    Chronic respiratory failure    Morbid obesity with BMI of 40.0-44.9, adult    Pulmonary hypertension    ESRD on hemodialysis    Hyperlipidemia    Anemia in ESRD (end-stage renal disease)    Chronic combined systolic and diastolic heart failure    Chronic gout due to renal impairment without tophus    Current use of long term anticoagulation    Acute on chronic respiratory failure with hypoxia    Current chronic use of systemic steroids    Immunosuppressed status    Hypoalbuminemia    GERD (gastroesophageal reflux disease)    Hemodialysis-associated hypotension    Secondary adrenal insufficiency    Vitamin D insufficiency    Weakness generalized    CRLD (chronic restrictive lung disease)    Polyneuropathy associated with underlying disease    PHN (postherpetic neuralgia)    Syncope    Osteopenia determined by x-ray    ESRD on dialysis    Permanent atrial fibrillation    Biventricular cardiac pacemaker in situ    Arteriovenous fistula occlusion    AV graft stenosis    ESRD (end stage renal disease) on dialysis    Complication of arteriovenous dialysis fistula    Purpura    Episode of transient neurologic symptoms    Right leg weakness    Dysarthria    Multinodular thyroid    Diabetes mellitus type II, controlled    Hyperkalemia    Hyperglycemia       Review of patient's allergies indicates:   Allergen Reactions    Bactrim [sulfamethoxazole-trimethoprim] Other (See Comments)     Causes renal failure    Nsaids (non-steroidal anti-inflammatory drug) Other  (See Comments)     Renal failure        No current facility-administered medications on file prior to encounter.      Current Outpatient Prescriptions on File Prior to Encounter   Medication Sig Dispense Refill    ACZONE 5 % topical gel Apply to face every morning  2    ammonium lactate (LAC-HYDRIN) 12 % lotion Apply topically as needed (to legs).   2    atorvastatin (LIPITOR) 80 MG tablet TAKE 1 TABLET(80 MG) BY MOUTH EVERY NIGHT 90 tablet 3    cholecalciferol, vitamin D3, 1,000 unit capsule Take 1 capsule (1,000 Units total) by mouth once daily. (Patient taking differently: Take 1,000 Units by mouth every evening. )  0    clopidogrel (PLAVIX) 75 mg tablet 75 mg.      diclofenac sodium (SOLARAZE) 3 % gel       digoxin (LANOXIN) 125 mcg tablet Take 1 tablet (0.125 mg total) by mouth every other day. 15 tablet 1    doxepin (ZONALON) 5 % cream       econazole nitrate 1 % cream APPLY TOPICALLY TWICE DAILY AS NEEDED FOR FUNGAL SKIN INFECTIONS. USE FOR 2 TO 4 WEEKS 30 g 0    fludrocortisone (FLORINEF) 0.1 mg Tab Take 100 mcg by mouth 2 (two) times daily.      gabapentin (NEURONTIN) 300 MG capsule TK ONE C PO  TID  2    ketorolac 0.5% (ACULAR) 0.5 % Drop instill one drop into both eyes every morning  3    levETIRAcetam (KEPPRA) 500 MG Tab Take 1 tablet (500 mg total) by mouth 2 (two) times daily. 180 tablet 3    midodrine (PROAMATINE) 5 MG Tab Take 2 tablets (10 mg total) by mouth every Mon, Wed, Fri, Sun. 32 tablet 11    nortriptyline (PAMELOR) 25 MG capsule Take 1 capsule (25 mg total) by mouth every evening. 30 capsule 11    omeprazole (PRILOSEC) 20 MG capsule TAKE 1 CAPSULE(20 MG) BY MOUTH EVERY DAY 90 capsule 3    polyethylene glycol (GLYCOLAX) 17 gram/dose powder Take 17 g by mouth once daily. Dissolve in juice. 510 g 0    prednisoLONE acetate (PRED FORTE) 1 % DrpS INSTILL ONE DROP INTO  LEFT EYE THREE TIMES A DAY  3    predniSONE (DELTASONE) 10 MG tablet TAKE 1 TABLET BY MOUTH EVERY DAY WITH  BREAKFAST 90 tablet 0    RENVELA 800 mg Tab TAKE 1 TABLET BY MOUTH THREE TIMES DAILY WITH MEALS 90 tablet 0    SENSIPAR 30 mg Tab       tizanidine 4 mg Cap TAKE 1 CAPSULE BY MOUTH DAILY AS NEEDED FOR MUSCLE SPASM 90 capsule 0    warfarin (COUMADIN) 5 MG tablet Take 1 tablet (5 mg total) by mouth Daily. Per [facility] (Patient taking differently: Take 5 mg by mouth every evening. Per [facility]) 90 tablet 3       Past Surgical History:   Procedure Laterality Date    ABDOMINAL SURGERY      CARDIAC PACEMAKER PLACEMENT       SECTION      x2    OTHER SURGICAL HISTORY      loop recorder implant    stent to fistula      VASCULAR SURGERY      fistula to L upper arm        Social History     Social History    Marital status: Single     Spouse name: N/A    Number of children: N/A    Years of education: N/A     Occupational History    Not on file.     Social History Main Topics    Smoking status: Former Smoker     Packs/day: 0.50     Years: 10.00     Types: Cigarettes     Quit date: 1994    Smokeless tobacco: Never Used    Alcohol use No      Comment: rarely    Drug use: No    Sexual activity: Not Currently     Other Topics Concern    Not on file     Social History Narrative    Lives with boyfriend/partner who helps with her care.              Vital Signs Range (Last 24H):         CBC:  Lab Results   Component Value Date    WBC 8.31 2018    HGB 13.6 2018    HCT 43.0 2018    MCV 91 2018     (L) 2018       CMP:   CMP  Sodium   Date Value Ref Range Status   2018 138 136 - 145 mmol/L Final     Potassium   Date Value Ref Range Status   2018 4.9 3.5 - 5.1 mmol/L Final     Chloride   Date Value Ref Range Status   2018 93 (L) 95 - 110 mmol/L Final     CO2   Date Value Ref Range Status   2018 26 23 - 29 mmol/L Final     Glucose   Date Value Ref Range Status   2018 55 (L) 70 - 110 mg/dL Final     BUN, Bld   Date Value Ref Range  Status   01/03/2018 37 (H) 6 - 20 mg/dL Final     Creatinine   Date Value Ref Range Status   01/03/2018 7.3 (H) 0.5 - 1.4 mg/dL Final     Calcium   Date Value Ref Range Status   01/03/2018 8.9 8.7 - 10.5 mg/dL Final     Total Protein   Date Value Ref Range Status   01/02/2018 7.0 6.0 - 8.4 g/dL Final     Albumin   Date Value Ref Range Status   01/02/2018 3.0 (L) 3.5 - 5.2 g/dL Final     Total Bilirubin   Date Value Ref Range Status   01/02/2018 0.5 0.1 - 1.0 mg/dL Final     Comment:     For infants and newborns, interpretation of results should be based  on gestational age, weight and in agreement with clinical  observations.  Premature Infant recommended reference ranges:  Up to 24 hours.............<8.0 mg/dL  Up to 48 hours............<12.0 mg/dL  3-5 days..................<15.0 mg/dL  6-29 days.................<15.0 mg/dL       Alkaline Phosphatase   Date Value Ref Range Status   01/02/2018 172 (H) 55 - 135 U/L Final     AST   Date Value Ref Range Status   01/02/2018 48 (H) 10 - 40 U/L Final     ALT   Date Value Ref Range Status   01/02/2018 40 10 - 44 U/L Final     Anion Gap   Date Value Ref Range Status   01/03/2018 19 (H) 8 - 16 mmol/L Final     eGFR if    Date Value Ref Range Status   01/03/2018 6.6 (A) >60 mL/min/1.73 m^2 Final     eGFR if non    Date Value Ref Range Status   01/03/2018 5.7 (A) >60 mL/min/1.73 m^2 Final     Comment:     Calculation used to obtain the estimated glomerular filtration  rate (eGFR) is the CKD-EPI equation.          INR  No results for input(s): PT, INR, PROTIME, APTT in the last 72 hours.        Diagnostic Studies:      2D Echo:    CONCLUSIONS     1 - Moderately depressed left ventricular systolic function (EF 30-35%).     2 - Eccentric hypertrophy.     3 - Biatrial enlargement.     4 - Normal right ventricular systolic function .     5 - The estimated PA systolic pressure is greater than 20 mmHg.             This document has been electronically     SIGNED BY: Haleigh Aguirre MD On: 12/08/2017 15:59    Anesthesia Evaluation    I have reviewed the Patient Summary Reports.     I have reviewed the Medications.     Review of Systems  Anesthesia Hx:  History of prior surgery of interest to airway management or planning:  Denies Personal Hx of Anesthesia complications.   Social:  Non-Smoker, No Alcohol Use    Hematology/Oncology:  Hematology Normal   Oncology Normal     EENT/Dental:EENT/Dental Normal   Cardiovascular:   Exercise tolerance: good Hypertension Dysrhythmias atrial fibrillation CHF (improved, EF recovered to ~50%, diastolic dys. Mostly resolved pHTN) NYHA Classification III    Pulmonary:   COPD (sarcoidosis - 2L continuous O2. Currently not SOB, able to do errands), severe    Renal/:   Chronic Renal Disease (yesterday - denies SOB/fatigue/swelling), ESRD, Dialysis Increased AVG velocities   Hepatic/GI:  Hepatic/GI Normal    Musculoskeletal:  Musculoskeletal Normal    Neurological:   Seizures, well controlled    Dermatological:  Skin Normal    Psych:  Psychiatric Normal           Physical Exam  General:  Morbid Obesity    Airway/Jaw/Neck:  Airway Findings: Mallampati: II TM Distance: Normal, at least 6 cm  Jaw/Neck Findings:  Neck ROM: Normal ROM       Chest/Lungs:  Chest/Lungs Findings: Normal Respiratory Rate     Heart/Vascular:  Heart Findings: Rate: Normal        Mental Status:  Mental Status Findings:  Cooperative, Alert and Oriented         Anesthesia Plan  Type of Anesthesia, risks & benefits discussed:  Anesthesia Type:  MAC  Patient's Preference:   Intra-op Monitoring Plan:   Intra-op Monitoring Plan Comments:   Post Op Pain Control Plan:   Post Op Pain Control Plan Comments:   Induction:   IV  Beta Blocker:         Informed Consent: Patient understands risks and agrees with Anesthesia plan.  Questions answered. Anesthesia consent signed with patient.  ASA Score: 4     Day of Surgery Review of History & Physical:            Ready For Surgery From  Anesthesia Perspective.

## 2018-01-13 NOTE — OP NOTE
DATE OF PROCEDURE:  01/10/2018    SURGEON:  Torrey Villafana M.D.    PREOPERATIVE DIAGNOSIS:  Stenosis venous outflow of left upper extremity AV   graft.    POSTOPERATIVE DIAGNOSIS:  Stenosis venous outflow of left upper extremity AV   graft.    PROCEDURES:  1.  Left upper extremity fistulogram.  2.  PTA with 8 x 40 [place] balloon of venous outflow of left upper extremity AV   graft.  3.  PTA with 9 x 40 [place] balloon of venous outflow of left upper extremity AV   graft.    ANESTHESIA:  Local plus MAC.    DESCRIPTION OF PROCEDURE:  The patient is a 57-year-old female well known to me   with an indwelling left upper extremity loop AVF graft for hemodialysis.  She   has been having trouble completing runs on the circuit due to venous   hypertension and a recent left upper extremity duplex demonstrated an outflow   stenosis that was significant.  For this reason, an intervention was planned.    DESCRIPTION OF PROCEDURE:  The patient was identified as Sisi Medel and   brought to the Operating Room.  She was positioned supine on the operating room   table and her left arm was prepped and draped in standard sterile fashion.    After a team wide timeout during which the procedure and laterality were agreed   upon by all Operating Room staff, a subcutaneous wheal of 1% lidocaine was   raised over the arterial inflow of the graft.  The graft was palpated and   accessed with a micropuncture needle.  A micropuncture wire was then advanced   under fluoroscopic guidance.  The micropuncture needle was removed and   micropuncture sheath was inserted.  The inner cannula and micropuncture wire   were removed and an 0.035 J-wire was placed under fluoroscopic guidance.  The   micropuncture sheath was removed and a 6-Armenian short working sheath was placed   over the 0.035 J-wire.  A fistulogram demonstrated a critical stenosis of the   venous outflow of the left upper extremity AV graft.  A 0.035 stiff angled   Glidewire and 65  cm angled glide catheter were used to maneuver past the   stenosis.  A magnification angiogram was obtained and based on road mapping   guidance, an 8 x 40 [place] balloon was brought onto the field and prepped in   standard fashion.  It was placed over the wire using roadmap imaging to straddle   the outflow stenosis and inflated to pressure of 18 atmospheres with near   resolution of the stenosis.  After a period of 2 minutes, the balloon was   deflated and withdrawn.  Follow fistulogram demonstrated resolution of stenosis   with a 30% residual.  For this reason, a 9 x 40 [place] balloon was brought onto   the field and positioned across the venous outflow anastomosis and inflated to   20 atmospheres.  After a period of 2 minutes, the balloon was deflated and   removed and a followup fistulogram demonstrated resolution of stenosis with   persistent stenosis of under 20%.  There was brisk flow throughout the graft and   into the central venous circulation.    At this time, wires and catheters were removed and a 3-0 Monocryl suture was   used to close the defect with a 6-Romanian sheath, which was then removed.    After completion of the procedure, the patient had a palpable left radial pulse   and thrill of the inflow aspect of the AV graft.    The patient tolerated the procedure well and was transferred to the   Postanesthesia Care Unit in stable condition.      KARIE/RENUKA  dd: 01/12/2018 17:59:13 (CST)  td: 01/12/2018 22:25:22 (CST)  Doc ID   #[id number]  Job ID #[id number]    CC:

## 2018-01-19 ENCOUNTER — ANTI-COAG VISIT (OUTPATIENT)
Dept: CARDIOLOGY | Facility: CLINIC | Age: 58
End: 2018-01-19
Payer: MEDICARE

## 2018-01-19 DIAGNOSIS — Z79.01 LONG TERM (CURRENT) USE OF ANTICOAGULANTS: Primary | ICD-10-CM

## 2018-01-19 DIAGNOSIS — Z79.01 CURRENT USE OF LONG TERM ANTICOAGULATION: ICD-10-CM

## 2018-01-19 LAB — INR PPP: 3.7 (ref 2–3)

## 2018-01-19 PROCEDURE — 85610 PROTHROMBIN TIME: CPT | Mod: QW,S$GLB,, | Performed by: PHARMACIST

## 2018-01-19 NOTE — PROGRESS NOTES
INR elevated today. She reports possibly fluid retention over the last week. INR high and low on same dose. We will hold her coumadin today and re challenge previous dose. Patient was re-educated on situations that would require placing a call to the coumadin clinic, including bleeding or unusual bruising issues, changes in health, diet or medications, upcoming procedures that require warfarin interruption, and missed coumadin dose(s). Patient expressed understanding that avoidance of consistency with these parameters could cause fluctuations in INR, leading to more frequent visits and increase risk of adverse events.

## 2018-01-19 NOTE — ANESTHESIA POSTPROCEDURE EVALUATION
"Anesthesia Post Evaluation    Patient: Sisi Medel    Procedure(s) Performed: Procedure(s) (LRB):  fistulogram (Left)  ANGIOPLASTY (Left)    Final Anesthesia Type: general  Patient location during evaluation: PACU  Patient participation: Yes- Able to Participate  Level of consciousness: awake and alert  Post-procedure vital signs: reviewed and stable  Pain management: adequate  Airway patency: patent  PONV status at discharge: No PONV  Anesthetic complications: no      Cardiovascular status: stable  Respiratory status: unassisted and spontaneous ventilation  Hydration status: euvolemic  Follow-up not needed.        Visit Vitals  /79 (BP Location: Right arm, Patient Position: Lying)   Pulse 70   Temp 36.4 °C (97.5 °F) (Tympanic)   Resp 19   Ht 5' 8" (1.727 m)   Wt 121 kg (266 lb 12.1 oz)   LMP  (LMP Unknown)   SpO2 100%   BMI 40.56 kg/m²       Pain/Leslie Score: No Data Recorded      "

## 2018-01-19 NOTE — PT/OT/SLP DISCHARGE
Physical Therapy Discharge Summary    Name: Sisi Medel  MRN: [id number]   Principal Problem: Episode of transient neurologic symptoms     Patient Discharged from acute Physical Therapy on 12/9/2017.  Please refer to prior PT noted date on 12/8/2017 for functional status.     Assessment:     Patient appropriate for care in another setting.    Objective:     GOALS:    Physical Therapy Goals        Problem: Physical Therapy Goal    Goal Priority Disciplines Outcome Goal Variances Interventions   Physical Therapy Goal     PT/OT, PT Ongoing (interventions implemented as appropriate)     Description:  1. Pt will perform sit to stand transfers with independence.    2. Pt will perform bed <> chair transfers with independence with and without small base quad cane.  3. Pt will perform gait x 150 feet with stand by assist and no LOB using small base quad cane.  4. Pt will ascend/descend 5 steps with L rail and stand by assist to access home environment safely.     Goals to be met by 12/15/2017                    Reasons for Discontinuation of Therapy Services  Transfer to alternate level of care.      Plan:     Patient Discharged to: Home with Home Health Service.    Huma Booker, PT  1/19/2018

## 2018-01-22 DIAGNOSIS — M62.838 MUSCLE SPASM: ICD-10-CM

## 2018-01-24 RX ORDER — METOPROLOL TARTRATE 25 MG/1
25 TABLET, FILM COATED ORAL 2 TIMES DAILY
Qty: 60 TABLET | Refills: 0 | OUTPATIENT
Start: 2018-01-24 | End: 2019-01-01

## 2018-01-24 RX ORDER — TIZANIDINE HYDROCHLORIDE 4 MG/1
CAPSULE, GELATIN COATED ORAL
Qty: 90 CAPSULE | Refills: 0 | Status: SHIPPED | OUTPATIENT
Start: 2018-01-24 | End: 2018-03-12

## 2018-01-24 NOTE — TELEPHONE ENCOUNTER
Patient was previously on this med but it was stopped for low blood pressure and syncope > 6 months ago. It is not currently on her medication list. If she feels she needs to be on this medication, I'd recommend that she check in with her cardiologist (Dr Chavarria).     Please check with patient to see why she is requesting refill on this medication at this time.  Please also remind her of upcoming appt with me on 1/26 - she should bring all medications to that visit so that we can review and make sure her list is correct.

## 2018-01-24 NOTE — TELEPHONE ENCOUNTER
Spoke with pt and inquired about her Rx request refill, and she stated she didn't need it. I also informed her of her up coming appointment on the 26 and to bring in all Rx for review with Dr. Caputo.    Gissel'

## 2018-01-26 ENCOUNTER — OFFICE VISIT (OUTPATIENT)
Dept: INTERNAL MEDICINE | Facility: CLINIC | Age: 58
End: 2018-01-26
Payer: MEDICARE

## 2018-01-26 ENCOUNTER — ANTI-COAG VISIT (OUTPATIENT)
Dept: CARDIOLOGY | Facility: CLINIC | Age: 58
End: 2018-01-26
Payer: MEDICARE

## 2018-01-26 ENCOUNTER — HOSPITAL ENCOUNTER (OUTPATIENT)
Dept: VASCULAR SURGERY | Facility: CLINIC | Age: 58
Discharge: HOME | End: 2018-01-26
Payer: MEDICARE

## 2018-01-26 ENCOUNTER — OFFICE VISIT (OUTPATIENT)
Dept: VASCULAR SURGERY | Facility: CLINIC | Age: 58
End: 2018-01-26
Payer: MEDICARE

## 2018-01-26 VITALS
SYSTOLIC BLOOD PRESSURE: 139 MMHG | HEIGHT: 68 IN | HEART RATE: 68 BPM | DIASTOLIC BLOOD PRESSURE: 95 MMHG | BODY MASS INDEX: 42.07 KG/M2 | WEIGHT: 277.56 LBS | TEMPERATURE: 98 F

## 2018-01-26 VITALS
OXYGEN SATURATION: 92 % | HEIGHT: 68 IN | HEART RATE: 62 BPM | DIASTOLIC BLOOD PRESSURE: 70 MMHG | BODY MASS INDEX: 42.3 KG/M2 | SYSTOLIC BLOOD PRESSURE: 120 MMHG | WEIGHT: 279.13 LBS | TEMPERATURE: 98 F

## 2018-01-26 DIAGNOSIS — J98.4 CRLD (CHRONIC RESTRICTIVE LUNG DISEASE): ICD-10-CM

## 2018-01-26 DIAGNOSIS — N18.6 ESRD (END STAGE RENAL DISEASE) ON DIALYSIS: ICD-10-CM

## 2018-01-26 DIAGNOSIS — E66.01 MORBID OBESITY WITH BMI OF 40.0-44.9, ADULT: Chronic | ICD-10-CM

## 2018-01-26 DIAGNOSIS — I50.42 CHRONIC COMBINED SYSTOLIC AND DIASTOLIC HEART FAILURE: Chronic | ICD-10-CM

## 2018-01-26 DIAGNOSIS — T82.9XXD COMPLICATION OF ARTERIOVENOUS DIALYSIS FISTULA, SUBSEQUENT ENCOUNTER: Primary | ICD-10-CM

## 2018-01-26 DIAGNOSIS — Z79.01 CURRENT USE OF LONG TERM ANTICOAGULATION: Primary | ICD-10-CM

## 2018-01-26 DIAGNOSIS — T82.9XXD COMPLICATION OF ARTERIOVENOUS DIALYSIS FISTULA, SUBSEQUENT ENCOUNTER: ICD-10-CM

## 2018-01-26 DIAGNOSIS — I48.21 PERMANENT ATRIAL FIBRILLATION: ICD-10-CM

## 2018-01-26 DIAGNOSIS — Z99.2 ESRD (END STAGE RENAL DISEASE) ON DIALYSIS: ICD-10-CM

## 2018-01-26 DIAGNOSIS — E27.49 SECONDARY ADRENAL INSUFFICIENCY: ICD-10-CM

## 2018-01-26 DIAGNOSIS — G63 POLYNEUROPATHY ASSOCIATED WITH UNDERLYING DISEASE: ICD-10-CM

## 2018-01-26 DIAGNOSIS — D86.0 PULMONARY SARCOIDOSIS: Chronic | ICD-10-CM

## 2018-01-26 DIAGNOSIS — J96.11 CHRONIC RESPIRATORY FAILURE WITH HYPOXIA: ICD-10-CM

## 2018-01-26 DIAGNOSIS — G40.909 SEIZURE DISORDER: Chronic | ICD-10-CM

## 2018-01-26 DIAGNOSIS — E11.9 TYPE 2 DIABETES MELLITUS WITHOUT COMPLICATION, WITHOUT LONG-TERM CURRENT USE OF INSULIN: Primary | ICD-10-CM

## 2018-01-26 DIAGNOSIS — D84.9 IMMUNOSUPPRESSED STATUS: ICD-10-CM

## 2018-01-26 DIAGNOSIS — E55.9 VITAMIN D INSUFFICIENCY: ICD-10-CM

## 2018-01-26 LAB — INR PPP: 2.3 (ref 2–3)

## 2018-01-26 PROCEDURE — 99999 PR PBB SHADOW E&M-EST. PATIENT-LVL IV: CPT | Mod: PBBFAC,,, | Performed by: INTERNAL MEDICINE

## 2018-01-26 PROCEDURE — 99214 OFFICE O/P EST MOD 30 MIN: CPT | Mod: S$GLB,,, | Performed by: INTERNAL MEDICINE

## 2018-01-26 PROCEDURE — 99999 PR PBB SHADOW E&M-EST. PATIENT-LVL V: CPT | Mod: PBBFAC,,, | Performed by: SURGERY

## 2018-01-26 PROCEDURE — 3008F BODY MASS INDEX DOCD: CPT | Mod: S$GLB,,, | Performed by: INTERNAL MEDICINE

## 2018-01-26 PROCEDURE — 99211 OFF/OP EST MAY X REQ PHY/QHP: CPT | Mod: 25,S$GLB,, | Performed by: PHARMACIST

## 2018-01-26 PROCEDURE — 85610 PROTHROMBIN TIME: CPT | Mod: QW,S$GLB,, | Performed by: PHARMACIST

## 2018-01-26 PROCEDURE — 99214 OFFICE O/P EST MOD 30 MIN: CPT | Mod: S$GLB,,, | Performed by: SURGERY

## 2018-01-26 PROCEDURE — 93990 DOPPLER FLOW TESTING: CPT | Mod: S$GLB,,, | Performed by: SURGERY

## 2018-01-26 PROCEDURE — 99499 UNLISTED E&M SERVICE: CPT | Mod: S$GLB,,, | Performed by: INTERNAL MEDICINE

## 2018-01-26 RX ORDER — INSULIN PUMP SYRINGE, 3 ML
EACH MISCELLANEOUS
Qty: 1 EACH | Refills: 0 | Status: SHIPPED | OUTPATIENT
Start: 2018-01-26 | End: 2018-01-30 | Stop reason: SDUPTHER

## 2018-01-26 RX ORDER — LANCING DEVICE
1 EACH MISCELLANEOUS 2 TIMES DAILY WITH MEALS
Qty: 1 EACH | Refills: 0 | Status: SHIPPED | OUTPATIENT
Start: 2018-01-26 | End: 2018-01-26 | Stop reason: SDUPTHER

## 2018-01-26 RX ORDER — LANCING DEVICE
1 EACH MISCELLANEOUS 2 TIMES DAILY WITH MEALS
Qty: 1 EACH | Refills: 0 | Status: SHIPPED | OUTPATIENT
Start: 2018-01-26 | End: 2018-01-30 | Stop reason: SDUPTHER

## 2018-01-26 RX ORDER — INSULIN PUMP SYRINGE, 3 ML
EACH MISCELLANEOUS
Qty: 1 EACH | Refills: 0 | Status: SHIPPED | OUTPATIENT
Start: 2018-01-26 | End: 2018-01-26 | Stop reason: SDUPTHER

## 2018-01-26 RX ORDER — LANCETS
1 EACH MISCELLANEOUS 2 TIMES DAILY WITH MEALS
Qty: 100 EACH | Refills: 3 | Status: SHIPPED | OUTPATIENT
Start: 2018-01-26 | End: 2018-01-30 | Stop reason: SDUPTHER

## 2018-01-26 RX ORDER — LANCETS
1 EACH MISCELLANEOUS 2 TIMES DAILY WITH MEALS
Qty: 100 EACH | Refills: 3 | Status: SHIPPED | OUTPATIENT
Start: 2018-01-26 | End: 2018-01-26 | Stop reason: SDUPTHER

## 2018-01-26 NOTE — PROGRESS NOTES
Pt seen by Miriam WARE. I have reviewed her initial findings and agree with her assessment and plan

## 2018-01-26 NOTE — PROGRESS NOTES
Subjective:       Patient ID: Sisi Medel is a 57 y.o. female.    Chief Complaint: Follow-up    HPI  58 y/o woman with h/o sarcoidosis, chronic combined CHF (most recent EF 40-45%), PAF, HTN, ESRD on HD MWF, obesity, YOANDY, chronic respiratory failure here for brief hospital discharge follow-up visit.  Last seen 4/2017, recommended to follow up in 3 months at that time.    Traveled to [place] from 12/16-27/17.   Admitted 1/2-3 for shortness of breath, noted to have increased thirst and fluid intake for the week prior.   Since that time she has been feeling overall about the same as her usual.    H/o DM / borderline DM - A1c ranging from 5.5 to 6.7 over past 4 years. In the past 3 months has been consistently in DM range from 6.5 - 6.8, most recently 6.6. Needs new DM supplies.  On chronic steroids.  Not limiting carbs in diet.   Does feel thirsty often. Doesn't urinate.  Sees eye doctor outside of [facility].    Atrial fibrillation, CHF - afib onset 6/2016, underwent JARAD/DC cardioversion at that time but has been in paroxysmal AF since.    ESRD on HD TTS - follows with Dr Aaron Tesfaye for dialysis.  Taking renvela. Elevated PTH.  Vitamin D low - taking supplement    Sarcoidosis, chronic hypoxia - follows with Dr Harvey with Pulmonology. On chronic steroids. Stable / doing ok since her hospitalization.    No seizures recently.     Review of Systems   Constitutional: Positive for fatigue. Negative for activity change, appetite change and fever.   HENT: Negative.    Eyes: Negative for visual disturbance.   Respiratory: Positive for shortness of breath (baseline; improved since last visit). Negative for cough.    Cardiovascular: Negative for chest pain, palpitations and leg swelling.   Gastrointestinal: Negative for abdominal pain, constipation, diarrhea and nausea.   Endocrine: Positive for polydipsia.   Genitourinary: Positive for decreased urine volume (little to no urination (on HD)).   Musculoskeletal: Positive  "for arthralgias and myalgias (muscle aches, foot pain, foot cramps, neck cramps since starting HD; chronic).   Skin: Negative for rash.   Neurological: Positive for numbness (paresthesias / burning, both feet). Negative for dizziness, seizures, speech difficulty, weakness (generalized, intermittent; feeling ok now ) and light-headedness.   Psychiatric/Behavioral: Negative for dysphoric mood (related to health issues; better than usual today).         Past medical history, surgical history, and family medical history reviewed and updated as appropriate.    Medications and allergies reviewed.     Objective:          Vitals:    01/26/18 1540   BP: 120/70   BP Location: Right arm   Patient Position: Sitting   Pulse: 62   Temp: 97.7 °F (36.5 °C)   TempSrc: Oral   SpO2: (!) 92%   Weight: 126.6 kg (279 lb 1.6 oz)   Height: 5' 8" (1.727 m)     Body mass index is 42.44 kg/m².  Physical Exam   Constitutional: She is oriented to person, place, and time. She appears well-developed and well-nourished. No distress.   Sitting comfortably. Wearing portable O2.    HENT:   Head: Normocephalic and atraumatic.   Mouth/Throat: Oropharynx is clear and moist.   Eyes: Conjunctivae and EOM are normal. Pupils are equal, round, and reactive to light.   Neck: Normal range of motion. Neck supple. No JVD present.   Cardiovascular: Normal rate, regular rhythm and normal heart sounds.    No murmur heard.  Pulmonary/Chest: Effort normal and breath sounds normal. No respiratory distress. She has no wheezes. She has no rales.   Abdominal: Soft. Bowel sounds are normal. She exhibits no distension. There is no tenderness.   Musculoskeletal: She exhibits no edema or tenderness.   Lymphadenopathy:     She has no cervical adenopathy.   Neurological: She is alert and oriented to person, place, and time. She has normal strength. No cranial nerve deficit or sensory deficit. Gait normal.   Skin: Skin is warm and dry. She is not diaphoretic.   Psychiatric: She " has a normal mood and affect.   Vitals reviewed.      Lab Results   Component Value Date    WBC 8.31 01/03/2018    HGB 13.6 01/03/2018    HCT 43.0 01/03/2018     (L) 01/03/2018    CHOL 169 12/08/2017    TRIG 98 12/08/2017    HDL 58 12/08/2017    ALT 40 01/02/2018    AST 48 (H) 01/02/2018     01/03/2018    K 4.9 01/03/2018    CL 93 (L) 01/03/2018    CREATININE 7.3 (H) 01/03/2018    BUN 37 (H) 01/03/2018    CO2 26 01/03/2018    TSH 1.444 04/08/2017    INR 2.3 01/26/2018    HGBA1C 6.6 (H) 01/02/2018       Assessment:       1. Type 2 diabetes mellitus without complication, without long-term current use of insulin    2. Chronic respiratory failure with hypoxia    3. CRLD (chronic restrictive lung disease)    4. Chronic combined systolic and diastolic heart failure    5. ESRD (end stage renal disease) on dialysis    6. Pulmonary sarcoidosis    7. Seizure disorder    8. Morbid obesity with BMI of 40.0-44.9, adult    9. Polyneuropathy associated with underlying disease    10. Immunosuppressed status    11. Secondary adrenal insufficiency    12. Vitamin D insufficiency    13. Permanent atrial fibrillation        Plan:   Sisi was seen today for follow-up.    Diagnoses and all orders for this visit:    Type 2 diabetes mellitus without complication, without long-term current use of insulin - willing to see DM educator to work on improving diet and managing blood sugar  New rx for DM supplies given  -     Ambulatory consult to Diabetic Education  -     Discontinue: blood-glucose meter kit; Use as instructed  -     Discontinue: blood sugar diagnostic Strp; 1 strip by Misc.(Non-Drug; Combo Route) route 2 (two) times daily with meals.  -     Discontinue: lancing device Misc; 1 Device by Misc.(Non-Drug; Combo Route) route 2 (two) times daily with meals.  -     Discontinue: lancets Misc; 1 lancet by Misc.(Non-Drug; Combo Route) route 2 (two) times daily with meals.  -     Discontinue: lancets Misc; 1 lancet by  Misc.(Non-Drug; Combo Route) route 2 (two) times daily with meals.  -     Discontinue: lancing device Misc; 1 Device by Misc.(Non-Drug; Combo Route) route 2 (two) times daily with meals.  -     Discontinue: blood sugar diagnostic Strp; 1 strip by Misc.(Non-Drug; Combo Route) route 2 (two) times daily with meals.  -     Discontinue: blood-glucose meter kit; Use as instructed    Chronic respiratory failure with hypoxia - on home O2, continue this, follow up with pulmonology. At high risk for severe symptoms with any volume overload related to her ESRD    CRLD (chronic restrictive lung disease) - reviewed; continue to monitor    Chronic combined systolic and diastolic heart failure - stable and well-compensated currently  Permanent atrial fibrillation - currently asymptomatic. follow with cardiology - recommended check in with Dr Deon pandey: next appt    ESRD (end stage renal disease) on dialysis - continue to follow with nephrologist, HD center    Pulmonary sarcoidosis - stable, following with pulmonology.     Seizure disorder - continue kera, follow with neurology    Morbid obesity with BMI of 40.0-44.9, adult - limited ability to exercise; recommended work on lower-carb diet to help with weight loss    Polyneuropathy associated with underlying disease - stable, continue current treatment    Immunosuppressed status - on ESRD. following    Secondary adrenal insufficiency - recommended continue to follow with Endocrine for this    Vitamin D insufficiency - recommended at least 4000-5000IU daily    Health maintenance reviewed with patient. She thinks she has had flu vaccine.  Contact info card given for her to give to her eye doctor to have report sent after this is done.    Follow-up in about 2 months (around 3/26/2018) for follow up.    Carlos A Caputo MD  Internal Medicine  [facility]  1/26/2018

## 2018-01-26 NOTE — PROGRESS NOTES
Sisi Medel  12/11/2017    HPI:  Patient is a 55 y.o.  female with a h/o HTN, HLD, CHF (EF 20%), COPD on Home O2, Paroxysmal Afib (On Coumadin anticoagulation), Seizure disorder, ESRD on HD via LUE AVG who is here for evaluation of her LUE upper arm AV graft (placed by 2/3/16). She had recent PTA of the graft (brachial vein) x2: (7x60 [place]); (8x40 [place]o) on 10/12/17. The AV graft was working well until a few weeks ago when she reports that her arterial access site began to have increased pressure. She does dialysis T/Th/Sat. Otherwise patient is doing well with no complaints.    S/p   2/3/16: LUE AVG creation (Dr Villafana)  6/21/16: Percutaneous mechanical thrombectomy w Possis Angiojet AVX; 4fr OTW embolectomy of arterial inflow   PTA outflow stenosis with a 7x40 mm balloon  10/12/16: fistulogram (75% stenosis noted), left upper extremity AV graft PTA venous outflow with resolution of stenosis noted  2/2017 Declot and venous outflow PTA and stent with 8 x 50 VIABAHN  5/15/2017: PTA outflow stenosis (8x60 mustang); Stent outflow stenosis (8x50 Viabahn)  10/12/17: PTA LUE AV graft (brachial vein) x2: (7x60 [place]); (8x40 [place]o)   01/10/18: PTA outflow stensois (8x40 [place]o) and (9x40 [place]o)     Findings/Key Components:   Good palpable thrill      MI/stroke  Tobacco use: Former smoker     Past Medical History   Diagnosis Date    Anemia in ESRD (end-stage renal disease) 3/7/2016    Cervical radiculopathy 7/20/2015    Chronic combined systolic and diastolic heart failure 6/14/2013     EF 20% 2013, improved with Medical therapy, negative PET 2013    Chronic respiratory failure with hypoxia 04/22/2013     On home oxygen at 2-5 liters    Chronic rhinitis 10/2/2013    DDD (degenerative disc disease), lumbar 6/17/2015    ESRD on hemodialysis 2/23/2016    Essential hypertension     Gout     Hyperlipidemia 3/7/2016    Lateral meniscal tear 5/31/2016    Lumbar stenosis 6/17/2015     Morbid obesity 8/15/2013    Paroxysmal atrial fibrillation 2014     Not on anticoagulation    Peripheral neuropathy 2013    Personal history of fall 2014    Personal history of gastric ulcer 3/19/2013    Sarcoidosis, lung 2009     Diagnosed in  with ocular manifestation, on 4L home O2 and PO steroids    Secondary pulmonary hypertension 2015    Seizure disorder 3/19/2013    Shingles 2013    Spondylarthrosis 2015     Past Surgical History   Procedure Laterality Date     section, classic      Abdominal surgery      Vascular surgery       Family History   Problem Relation Age of Onset    Kidney failure Mother     Hypertension Mother     Diabetes Mother     Coronary artery disease Father     Hypertension Father     Diabetes Father     Lupus Sister     Diabetes Maternal Grandmother     Asthma Neg Hx     Emphysema Neg Hx     Melanoma Neg Hx     Psoriasis Neg Hx      Social History     Social History    Marital status: Single     Spouse name: N/A    Number of children: N/A    Years of education: N/A     Occupational History    Not on file.     Social History Main Topics    Smoking status: Former Smoker     Packs/day: 0.50     Years: 10.00     Types: Cigarettes     Quit date: 1994    Smokeless tobacco: Never Used    Alcohol use No    Drug use: No    Sexual activity: No     Other Topics Concern    Not on file     Social History Narrative    Lives with boyfriend/partner who helps with her care.     Plans to travel to [place] for 2 weeks in 2016     Current Outpatient Prescriptions on File Prior to Visit   Medication Sig    ACZONE 5 % topical gel Apply topically once daily.     allopurinol (ZYLOPRIM) 100 MG tablet Take 1 tablet (100 mg total) by mouth once daily.    amiodarone (PACERONE) 200 MG Tab Take 1 tablet (200 mg total) by mouth every morning.    ammonium lactate (LAC-HYDRIN) 12 % lotion Apply topically as needed (for scars on arms).      aspirin 81 MG Chew Take 81 mg by mouth every morning.    atorvastatin (LIPITOR) 80 MG tablet Take 80 mg by mouth once daily.     capsicum 0.075% (ZOSTRIX) 0.075 % topical cream Apply topically 3 (three) times daily. For neuropathic pain of feet    gabapentin (NEURONTIN) 100 MG capsule TAKE ONE CAPSULE BY MOUTH THREE TIMES DAILY (Patient taking differently: TAKE ONE CAPSULE BY MOUTH THREE TIMES DAILY-noon, evening, bedtime)    levetiracetam (KEPPRA) 500 MG Tab TAKE 1 TABLET BY MOUTH TWICE DAILY.    melatonin 5 mg Tab Take 1 tablet by mouth nightly.    midodrine (PROAMATINE) 10 MG tablet     midodrine (PROAMATINE) 5 MG Tab     NEPHRO-LINDA 0.8 mg Tab TK 1 T PO  QD    ondansetron (ZOFRAN-ODT) 8 MG TbDL Take 1 tablet (8 mg total) by mouth every 8 (eight) hours as needed.    oxycodone-acetaminophen (PERCOCET) 7.5-325 mg per tablet Take 1 tablet by mouth every 4 (four) hours as needed.    predniSONE (DELTASONE) 10 MG tablet TAKE 3 TABLETS BY MOUTH FOR 7 DAYS, THEN 2 TABLETS FOR 7 DAYS, THEN 1 TABLET EVERY DAY AFTER.    RENVELA 800 mg Tab TAKE 1 TABLET BY MOUTH THREE TIMES DAILY WITH MEALS    tizanidine 4 mg Cap Take 4 mg by mouth 2 (two) times daily as needed. (Patient taking differently: Take 4 mg by mouth daily as needed (for muscle spasms.). )    warfarin (COUMADIN) 5 MG tablet TAKE 1 TABLET(5 MG) BY MOUTH DAILY     No current facility-administered medications on file prior to visit.        REVIEW OF SYSTEMS:  General: negative; ENT: negative; Allergy and Immunology: negative; Hematological and Lymphatic: Negative; Endocrine: negative; Respiratory: no cough, shortness of breath, or wheezing; Cardiovascular: no chest pain or dyspnea on exertion; Gastrointestinal: no abdominal pain/back, change in bowel habits, or bloody stools; Genito-Urinary: no dysuria, trouble voiding, or hematuria; Musculoskeletal: no pain, numbness, to LUE  Neurological: no TIA or stroke symptoms; Psychiatric: no nervousness,  anxiety or depression.    PHYSICAL EXAM:   Vitals:    16 1532   BP: (!) 81/57   Pulse: (!) 52   Temp: 98.9 °F (37.2 °C)       General appearance:  Alert, well-appearing, and in no distress.  Oriented to person, place, and time   Neurological:  Normal speech, no focal findings noted; CN II - XII grossly intact           Musculoskeletal: Digits/nail without cyanosis/clubbing.  Normal muscle strength/tone.                 Neck: Supple, no significant adenopathy; thyroid is not enlarged                Chest:  Clear to auscultation, symmetric air entry      No use of accessory muscles             Cardiac: Normal rate and regular rhythm, S1 and S2 normal; PMI non-displaced          Abdomen: Soft, nontender, nondistended, no masses or organomegaly      no rebound tenderness noted      Extremities:   LUE AVG with palpable thrill, no increased pulsatility, 2+ distal radial pulse      LAB RESULTS:  Lab Results   Component Value Date    K 4.5 2016    K 4.6 2016    K 5.1 2016    CREATININE 6.5 (H) 2016    CREATININE 10.2 (H) 2016    CREATININE 7.6 (H) 2016     Lab Results   Component Value Date    WBC 11.09 2016    WBC 10.38 2016    WBC 11.01 2016    HCT 37.5 2016    HCT 31.4 (L) 2016    HCT 30.1 (L) 2016     2016     2016     2016     Lab Results   Component Value Date    HGBA1C 5.7 2016    HGBA1C 6.7 (H) 10/15/2015    HGBA1C 5.5 06/15/2015     [id number] Hemodialysis US:  arterial anastomosis-387  proximal graft-177  mid graft-248  distal graft-169  venous anastomosis-177  venous outflow(stent)-155  venous confluence- 191                   volume flow: 1467 ml./min.   Velocity elevation of 636 cm/s is noted in the proximal graft with a visual narrowing - suggestive of a focal stenosis.       17 Hemodialysis US:  arterial anastomosis-360  proximal graft-266  mid graft-215  distal  graft-183  venous anastomosis-173  venous outflow(stent)-90  venous outflow(distal stent)-320  venous confluence- 408                    volume flow: 1328 ml./min.     Overall Impression:  Color flow duplex exam reveals a patent left hemodialysis AV graft and outflow stents. Velocity elevations were noted within the most distal stent and at the venous confluence. However, these elevations do not appear hemodyanmically significant or flow   limiting. Volume flow at mid graft level measures 1328 ml./min.    IMP/PLAN:  55 y.o. female with HTN, HLD, CHF (EF 20%), COPD on Home O2, Paroxysmal Afib (On Coumadin anticoagulation), Seizure disorder, ESRD on HD via LUE AVG who is here today for evaluation of her LUE upper arm AV graft (placed by Dr. Villafana 2/3/16). Given her history of AV graft thrombosis and multiple interventions, will plan for a fistulogram with possible intervention and reflux angiogram to examine arterial anastomosis.     1) will plan for fistulogram with possible intervention due to proximal graft stenosis  2) consented   3) continue coumadin    Torrey Villafana MD  Vascular & Endovascular Surgery

## 2018-01-26 NOTE — PATIENT INSTRUCTIONS
Call to check in with Dr Chavarria to see when you should have your next appointment.     Call to schedule an appointment with Dr Harvey around the end of February.       Understanding Carbohydrates, Fats, and Protein  Food is a source of fuel and nourishment for your body. Its also a source of pleasure. Having diabetes doesnt mean you have to eat special foods or give up desserts. Instead, your dietitian can show you how to plan meals to suit your body. To start, learn how different foods affect blood sugar.  Carbohydrates  Carbohydrates are the main source of fuel for the body. Carbohydrates raise blood sugar. Many people think carbohydrates are only found in pasta or bread. But carbohydrates are actually in many kinds of foods:  · Sugars occur naturally in foods such as fruit, milk, honey, and molasses. Sugars can also be added to many foods, from cereals and yogurt to candy and desserts. Sugars raise blood sugar.  · Starches are found in bread, cereals, pasta, and dried beans. Theyre also found in corn, peas, potatoes, yam, acorn squash, and butternut squash. Starches also raise blood sugar.   · Fiber is found in foods such as vegetables, fruits, beans, and whole grains. Unlike other carbs, fiber isnt digested or absorbed. So it doesnt raise blood sugar. In fact, fiber can help keep blood sugar from rising too fast. It also helps keep blood cholesterol at a healthy level.  Did you know?  Even though carbohydrates raise blood sugar, its best to have some in every meal. They are an important part of a healthy diet.   Fat  Fat is an energy source that can be stored until needed. Fat does not raise blood sugar. However, it can raise blood cholesterol, increasing the risk of heart disease. Fat is also high in calories, which can cause weight gain. Not all types of fat are the same.  More Healthy:  · Monounsaturated fats are mostly found in vegetable oils, such as olive, canola, and peanut oils. They are also found  in avocados and some nuts. Monounsaturated fats are healthy for your heart. Thats because they lower LDL (unhealthy) cholesterol.  · Polyunsaturated fats are mostly found in vegetable oils, such as corn, safflower, and soybean oils. They are also found in some seeds, nuts, and fish. Polyunsaturated fats lower LDL (unhealthy) cholesterol. So, choosing them instead of saturated fats is healthy for your heart. Certain unsaturated fats can help lower triglycerides.   Less Healthy:  · Saturated fats are found in animal products, such as meat, poultry, whole milk, lard, and butter. Saturated fats raise LDL cholesterol and are not healthy for your heart.  · Hydrogenated oils and trans fats are formed when vegetable oils are processed into solid fats. They are found in many processed foods. Hydrogenated oils and trans fats raise LDL cholesterol and lower HDL (healthy) cholesterol. They are not healthy for your heart.  Protein  Protein helps the body build and repair muscle and other tissue. Protein has little or no effect on blood sugar. However, many foods that contain protein also contain saturated fat. By choosing low-fat protein sources, you can get the benefits of protein without the extra fat:  · Plant protein is found in dry beans and peas, nuts, and soy products, such as tofu and soymilk. These sources tend to be cholesterol-free and low in saturated fat.  · Animal protein is found in fish, poultry, meat, cheese, milk, and eggs. These contain cholesterol and can be high in saturated fat. Aim for lean, lower-fat choices.  Date Last Reviewed: 3/1/2016  © 3042-5270 [employer]. [address] [place] [address], [place] [address]. All rights reserved. This information is not intended as a substitute for professional medical care. Always follow your healthcare professional's instructions.        Healthy Meals for Diabetes     A healthcare provider will help you develop a meal plan that fits your needs.   Ask your  healthcare team to help you make a meal plan that fits your needs. Your meal plan tells you when to eat your meals and snacks, what kinds of foods to eat, and how much of each food to eat. You dont have to give up all the foods you like. But you do need to follow some guidelines.  Choose healthy carbohydrates  Starches, sugars, and fiber are all types of carbohydrates. Fiber can help lower your cholesterol and triglycerides. Fiber is also healthy for your heart. You should have 20 to 35 grams of total fiber each day. Fiber-rich foods include:  · Whole-grain breads and cereals  · Bulgur wheat  · Brown rice     · Whole-wheat pasta  · Fruits and vegetables  · Dry beans, and peas   Keep track of the amount of carbohydrates you eat. This can help you keep the right balance of physical activity and medicine. The amount of carbohydrates needed will vary for each person. It depends on many things such as your health, the medicines you take, and how active you are. Your healthcare team will help you figure out the right amount of carbohydrates for you. You may start with around 45 to 60 grams of carbohydrates per meal, depending on your situation.   Here are some examples of foods containing about 15 grams of carbohydrates (1 serving of carbohydrates):  · 1/2 cup of canned or frozen fruit  · A small piece of fresh fruit (4 ounces)  · 1 slice of bread  · 1/2 cup of oatmeal  · 1/3 cup of rice  · 4 to 6 crackers  · 1/2 English muffin  · 1/2 cup of black beans  · 1/4 of a large baked potato (3 ounces)  · 2/3 cup of plain fat-free yogurt  · 1 cup of soup  · 1/2 cup of casserole  · 6 chicken nuggets  · 2-inch-square brownie or cake without frosting  · 2 small cookies  · 1/2 cup of ice cream or sherbet  Choose healthy protein foods  Eating protein that is low in fat can help you control your weight. It also helps keep your heart healthy. Low-fat protein foods include:  · Fish  · Plant proteins, such as dry beans and peas, nuts,  and soy products like tofu and soymilk  · Lean meat with all visible fat removed  · Poultry with the skin removed  · Low-fat or nonfat milk, cheese, and yogurt  Limit unhealthy fats and sugar  Saturated and trans fats are unhealthy for your heart. They raise LDL (bad) cholesterol. Fat is also high in calories, so it can make you gain weight. To cut down on unhealthy fats and sugar, limit these foods:  · Butter or margarine  · Palm and palm kernel oils and coconut oil  · Cream  · Cheese  · [place]  · Lunch meats     · Ice cream  · Sweet bakery goods such as pies, muffins, and donuts  · Jams and jellies  · Candy bars  · Regular sodas   How much to eat  The amount of food you eat affects your blood sugar. It also affects your weight. Your healthcare team will tell you how much of each type of food you should eat.  · Use measuring cups and spoons and a food scale to measure serving sizes.  · Learn what a correct serving size looks like on your plate. This will help when you are away from home and cant measure your servings.  · Eat only the number of servings given on your meal plan for each food. Dont take seconds.  · Learn to read food labels. Be sure to look at serving size, total carbohydrates, fiber, calories, sugar, and saturated and trans fats. Look for healthier alternatives to foods that have added sugar.  · Plan ahead for parties so you can still have a good time without going overboard with unhealthy food choices. Set a good example yourself by bringing a healthy dish to pot lucks.   Choose healthy snacks  When it comes to snacks, we usually think about foods with added sugar and fats. But there are many other options for healthier snack choices. Here are a few snack ideas to choose from:  Snacks with less than 5 grams of carbohydrates  · 1 piece of string cheese  · 3 celery sticks plus 1 tablespoon of peanut butter  · 5 cherry tomatoes plus 1 tablespoon of ranch dressing  · 1 hard-boiled egg  · 1/4 cup of  fresh blueberries  ·  5 baby carrots  · 1 cup of light popcorn  · 1/2 cup of sugar-free gelatin  · 15 almonds  Snacks with about 10 to 20 grams of carbohydrates  · 1/3 cup of hummus plus 1 cup of fresh cut nonstarchy vegetables (carrots, green peppers, broccoli, celery, or a combination)  · 1/2 cup of fresh or canned fruit plus 1/4 cup of cottage cheese  · 1/2 cup of tuna salad with 4 crackers  · 2 rice cakes and a tablespoon of peanut butter  · 1 small apple or orange  · 3 cups light popcorn  · 1/2 of a turkey sandwich (1 slice of whole-wheat bread, 2 ounces of turkey, and mustard)  Portion sizes are important to controlling your blood sugar and staying at a healthy weight. Stock up on healthy snack items so you always have them on hand.  When to eat  Your meal plan will likely include breakfast, lunch, dinner, and some snacks.  · Try to eat your meals and snacks at about the same times each day.  · Eat all your meals and snacks. Skipping a meal or snack can make your blood sugar drop too low. It can also cause you to eat too much at the next meal or snack. Then your blood sugar could get too high.  Date Last Reviewed: 7/1/2016  © 4693-9089 [employer]. [address] [place] [address], [place] [address]. All rights reserved. This information is not intended as a substitute for professional medical care. Always follow your healthcare professional's instructions.

## 2018-01-26 NOTE — PROGRESS NOTES
Quick follow-up for elevated INR 1/19. INR within normal range today. Patient is still inconsistent with her vitamin k intake, instructed on importance of consistency again. Denies any bleeding, bruising or other changes. Will decrease weekly dose to keep patient in range and follow-up in 2 weeks. Advised her to call with any changes or concerns.

## 2018-01-29 ENCOUNTER — TELEPHONE (OUTPATIENT)
Dept: ORTHOPEDICS | Facility: CLINIC | Age: 58
End: 2018-01-29

## 2018-01-29 NOTE — TELEPHONE ENCOUNTER
"Phone call to patient, RE: Appt with KIET Brown on 1/31. Appt was made for "sores on hands". Spoke with patient who made the appointment herself. Has not seen Derm or PCP for this issue. I explained that this was [facility] and if she had open wounds on her hands, she should start with her PCP. She may need to see Inf Disease vs Derm.   Pt verbalized understanding. Appt with Hand Clinic was cancelled.   "

## 2018-01-30 DIAGNOSIS — E11.9 TYPE 2 DIABETES MELLITUS WITHOUT COMPLICATION, WITHOUT LONG-TERM CURRENT USE OF INSULIN: ICD-10-CM

## 2018-01-30 NOTE — TELEPHONE ENCOUNTER
----- Message from Marie Tio sent at 1/30/2018 10:34 AM CST -----  Contact: [facility] [phone]  Rx Name: lancets Misc  blood-glucose meter kit  blood sugar diagnostic Strp  lancing device Misc    Comments: can't cover medication    Please call and advise.    Thank You

## 2018-01-31 NOTE — TELEPHONE ENCOUNTER
----- Message from Margaret Ron sent at 1/31/2018  8:45 AM CST -----  Contact: Self [phone]  Pt missed her  DIABETIC EDUCATION appt on 01/29 and r/s to 02/07 and pt needs a new referral linked to her appt .

## 2018-02-01 ENCOUNTER — TELEPHONE (OUTPATIENT)
Dept: INTERNAL MEDICINE | Facility: CLINIC | Age: 58
End: 2018-02-01

## 2018-02-01 PROBLEM — Z99.2 ESRD (END STAGE RENAL DISEASE) ON DIALYSIS: Chronic | Status: ACTIVE | Noted: 2017-10-12

## 2018-02-01 PROBLEM — N18.6 ESRD (END STAGE RENAL DISEASE) ON DIALYSIS: Chronic | Status: ACTIVE | Noted: 2017-10-12

## 2018-02-01 PROBLEM — D69.2 PURPURA: Status: RESOLVED | Noted: 2017-11-03 | Resolved: 2018-02-01

## 2018-02-01 RX ORDER — LANCETS
1 EACH MISCELLANEOUS 2 TIMES DAILY WITH MEALS
Qty: 100 EACH | Refills: 3 | Status: SHIPPED | OUTPATIENT
Start: 2018-02-01 | End: 2018-01-01

## 2018-02-01 RX ORDER — LANCING DEVICE
1 EACH MISCELLANEOUS 2 TIMES DAILY WITH MEALS
Qty: 1 EACH | Refills: 0 | Status: ON HOLD | OUTPATIENT
Start: 2018-02-01 | End: 2019-01-01

## 2018-02-01 RX ORDER — INSULIN PUMP SYRINGE, 3 ML
EACH MISCELLANEOUS
Qty: 1 EACH | Refills: 0 | Status: SHIPPED | OUTPATIENT
Start: 2018-02-01 | End: 2018-01-01

## 2018-02-01 NOTE — TELEPHONE ENCOUNTER
----- Message from Marie Kinsey sent at 2/1/2018  3:01 PM CST -----  Contact: [employer] [phone]  Patients diabetics supplies are not covered through the pharmacy. Requesting diabetic supplies orders to be faxed to [phone].     Please call and advise.    Thank You

## 2018-02-02 ENCOUNTER — HOSPITAL ENCOUNTER (OUTPATIENT)
Facility: HOSPITAL | Age: 58
Discharge: HOME | End: 2018-02-02
Attending: SURGERY | Admitting: SURGERY
Payer: MEDICARE

## 2018-02-02 VITALS
HEART RATE: 71 BPM | OXYGEN SATURATION: 92 % | RESPIRATION RATE: 18 BRPM | WEIGHT: 266.75 LBS | BODY MASS INDEX: 40.43 KG/M2 | DIASTOLIC BLOOD PRESSURE: 79 MMHG | HEIGHT: 68 IN | SYSTOLIC BLOOD PRESSURE: 113 MMHG | TEMPERATURE: 98 F

## 2018-02-02 DIAGNOSIS — Z99.2 ESRD (END STAGE RENAL DISEASE) ON DIALYSIS: ICD-10-CM

## 2018-02-02 DIAGNOSIS — I48.0 PAROXYSMAL ATRIAL FIBRILLATION: ICD-10-CM

## 2018-02-02 DIAGNOSIS — Z79.01 LONG TERM CURRENT USE OF ANTICOAGULANT THERAPY: ICD-10-CM

## 2018-02-02 DIAGNOSIS — N18.6 ESRD (END STAGE RENAL DISEASE) ON DIALYSIS: ICD-10-CM

## 2018-02-02 LAB — PERIPHERAL PTA: YES

## 2018-02-02 PROCEDURE — C1894 INTRO/SHEATH, NON-LASER: HCPCS

## 2018-02-02 PROCEDURE — 63600175 PHARM REV CODE 636 W HCPCS

## 2018-02-02 PROCEDURE — 25000003 PHARM REV CODE 250

## 2018-02-02 PROCEDURE — 36902 INTRO CATH DIALYSIS CIRCUIT: CPT

## 2018-02-02 PROCEDURE — C1769 GUIDE WIRE: HCPCS

## 2018-02-02 PROCEDURE — 27000014 CATH LAB PROCEDURE

## 2018-02-02 PROCEDURE — 99152 MOD SED SAME PHYS/QHP 5/>YRS: CPT | Mod: ,,, | Performed by: SURGERY

## 2018-02-02 PROCEDURE — 36902 INTRO CATH DIALYSIS CIRCUIT: CPT | Mod: ,,, | Performed by: SURGERY

## 2018-02-02 NOTE — NURSING
Patient arrived to room. PIV placed, labs sent. Admit assessment completed. Plan of care discussed with patient. Will monitor

## 2018-02-02 NOTE — PROGRESS NOTES
Pt returned to room AAOx4 and denies pain.  VSS.  L upper arm fistula site c/d/i without redness or swelling.  Family called to bedside.  Will continue to monitor.

## 2018-02-02 NOTE — DISCHARGE SUMMARY
[facility]  General Surgery  Discharge Summary      Patient Name: Sisi Medel  MRN: [id number]  Admission Date: 2/2/2018  Hospital Length of Stay: 0 days  Discharge Date and Time: No discharge date for patient encounter.  Attending Physician: Torrey Villafana MD   Discharging Provider: Scott Loyd MD  Primary Care Provider: Carlos A Caputo MD     HPI: OP fistulogram    Procedure(s) (LRB):  Fistulogram (Left)     Hospital Course: OP procedure    Consults:     Significant Diagnostic Studies: Fistulogram    Pending Diagnostic Studies:     None        Final Active Diagnoses:    Diagnosis Date Noted POA    PRINCIPAL PROBLEM:  ESRD (end stage renal disease) on dialysis [N18.6, Z99.2] 10/12/2017 Not Applicable     Chronic      Problems Resolved During this Admission:    Diagnosis Date Noted Date Resolved POA      Discharged Condition: good    Disposition: Home or Self Care    Follow Up:  Follow-up Information     Torrey Villafana MD. Schedule an appointment as soon as possible for a visit in 1 week.    Specialty:  Vascular Surgery  Contact information:  [address] [place] [id number]  [phone]                 Patient Instructions:     VAS US Hemodialysis Access   Standing Status: Future  Standing Exp. Date: 02/02/19     Activity as tolerated     Notify your health care provider if you experience any of the following:  increased confusion or weakness     Notify your health care provider if you experience any of the following:  persistent dizziness, light-headedness, or visual disturbances     Notify your health care provider if you experience any of the following:  worsening rash     Notify your health care provider if you experience any of the following:  severe persistent headache     Notify your health care provider if you experience any of the following:  difficulty breathing or increased cough     Notify your health care provider if you experience any of the  following:  redness, tenderness, or signs of infection (pain, swelling, redness, odor or green/yellow discharge around incision site)     Notify your health care provider if you experience any of the following:  severe uncontrolled pain     Notify your health care provider if you experience any of the following:  persistent nausea and vomiting or diarrhea     Notify your health care provider if you experience any of the following:  temperature >100.4     Remove dressing in 48 hours       Medications:  Reconciled Home Medications:   Current Discharge Medication List      CONTINUE these medications which have NOT CHANGED    Details   ACZONE 5 % topical gel Apply to face every morning  Refills: 2      atorvastatin (LIPITOR) 80 MG tablet TAKE 1 TABLET(80 MG) BY MOUTH EVERY NIGHT  Qty: 90 tablet, Refills: 3    Associated Diagnoses: Chronic combined systolic and diastolic heart failure      cholecalciferol, vitamin D3, 1,000 unit capsule Take 1 capsule (1,000 Units total) by mouth once daily.  Refills: 0      clopidogrel (PLAVIX) 75 mg tablet 75 mg once daily.       diclofenac sodium (SOLARAZE) 3 % gel       doxepin (ZONALON) 5 % cream       fludrocortisone (FLORINEF) 0.1 mg Tab Take 100 mcg by mouth 2 (two) times daily.      ketorolac 0.5% (ACULAR) 0.5 % Drop instill one drop into both eyes every morning  Refills: 3      levETIRAcetam (KEPPRA) 500 MG Tab Take 1 tablet (500 mg total) by mouth 2 (two) times daily.  Qty: 180 tablet, Refills: 3    Associated Diagnoses: Seizure disorder      midodrine (PROAMATINE) 5 MG Tab Take 2 tablets (10 mg total) by mouth every Mon, Wed, Fri, Sun.  Qty: 32 tablet, Refills: 11      nortriptyline (PAMELOR) 25 MG capsule Take 1 capsule (25 mg total) by mouth every evening.  Qty: 30 capsule, Refills: 11    Associated Diagnoses: Paresthesias      omeprazole (PRILOSEC) 20 MG capsule TAKE 1 CAPSULE(20 MG) BY MOUTH EVERY DAY  Qty: 90 capsule, Refills: 3    Comments: **Patient requests 90 days  supply**  Associated Diagnoses: Gastroesophageal reflux disease with esophagitis      polyethylene glycol (GLYCOLAX) 17 gram/dose powder Take 17 g by mouth once daily. Dissolve in juice.  Qty: 510 g, Refills: 0      prednisoLONE acetate (PRED FORTE) 1 % DrpS INSTILL ONE DROP INTO  LEFT EYE THREE TIMES A DAY  Refills: 3      predniSONE (DELTASONE) 10 MG tablet TAKE 1 TABLET BY MOUTH EVERY DAY WITH BREAKFAST  Qty: 90 tablet, Refills: 0    Associated Diagnoses: Sarcoidosis of lung      RENVELA 800 mg Tab TAKE 1 TABLET BY MOUTH THREE TIMES DAILY WITH MEALS  Qty: 90 tablet, Refills: 0      SENSIPAR 30 mg Tab daily with breakfast.       tiZANidine 4 mg Cap TAKE 1 CAPSULE BY MOUTH EVERY DAY AS NEEDED FOR MUSCLE SPASM  Qty: 90 capsule, Refills: 0    Associated Diagnoses: Muscle spasm      warfarin (COUMADIN) 5 MG tablet Take 1 tablet (5 mg total) by mouth Daily. Per [facility]  Qty: 90 tablet, Refills: 3    Comments: **Patient requests 90 days supply**  Associated Diagnoses: Long-term (current) use of anticoagulants; Paroxysmal atrial fibrillation      blood sugar diagnostic Strp 1 strip by Misc.(Non-Drug; Combo Route) route 2 (two) times daily with meals.  Qty: 100 strip, Refills: 3    Associated Diagnoses: Type 2 diabetes mellitus without complication, without long-term current use of insulin      blood-glucose meter kit Use as instructed  Qty: 1 each, Refills: 0    Associated Diagnoses: Type 2 diabetes mellitus without complication, without long-term current use of insulin      econazole nitrate 1 % cream APPLY TOPICALLY TWICE DAILY AS NEEDED FOR FUNGAL SKIN INFECTIONS. USE FOR 2 TO 4 WEEKS  Qty: 30 g, Refills: 0      lancets Misc 1 lancet by Misc.(Non-Drug; Combo Route) route 2 (two) times daily with meals.  Qty: 100 each, Refills: 3    Associated Diagnoses: Type 2 diabetes mellitus without complication, without long-term current use of insulin      lancing device Misc 1 Device by Misc.(Non-Drug; Combo Route) route  2 (two) times daily with meals.  Qty: 1 each, Refills: 0    Associated Diagnoses: Type 2 diabetes mellitus without complication, without long-term current use of insulin             Scott Loyd MD  General Surgery  [facility]

## 2018-02-02 NOTE — H&P (VIEW-ONLY)
Sisi Medel  12/11/2017    HPI:  Patient is a 55 y.o.  female with a h/o HTN, HLD, CHF (EF 20%), COPD on Home O2, Paroxysmal Afib (On Coumadin anticoagulation), Seizure disorder, ESRD on HD via LUE AVG who is here for evaluation of her LUE upper arm AV graft (placed by 2/3/16). She had recent PTA of the graft (brachial vein) x2: (7x60 [place]); (8x40 [place]) on 10/12/17. The AV graft was working well until a few weeks ago when she reports that her arterial access site began to have increased pressure. She does dialysis T/Th/Sat. Otherwise patient is doing well with no complaints.    S/p   2/3/16: LUE AVG creation (Dr Villafana)  6/21/16: Percutaneous mechanical thrombectomy w [place] Angiojet AVX; 4fr OTW embolectomy of arterial inflow   PTA outflow stenosis with a 7x40 mm balloon  10/12/16: fistulogram (75% stenosis noted), left upper extremity AV graft PTA venous outflow with resolution of stenosis noted  2/2017 Declot and venous outflow PTA and stent with 8 x 50 VIABAHN  5/15/2017: PTA outflow stenosis (8x60 [place]); Stent outflow stenosis (8x50 [place])  10/12/17: PTA LUE AV graft (brachial vein) x2: (7x60 [place]); (8x40 [place])   01/10/18: PTA outflow [place] (8x40 [place]) and (9x40 [place])     Findings/Key Components:   Good palpable thrill      MI/stroke  Tobacco use: Former smoker     Past Medical History   Diagnosis Date    Anemia in ESRD (end-stage renal disease) 3/7/2016    Cervical radiculopathy 7/20/2015    Chronic combined systolic and diastolic heart failure 6/14/2013     EF 20% 2013, improved with Medical therapy, negative PET 2013    Chronic respiratory failure with hypoxia 04/22/2013     On home oxygen at 2-5 liters    Chronic rhinitis 10/2/2013    DDD (degenerative disc disease), lumbar 6/17/2015    ESRD on hemodialysis 2/23/2016    Essential hypertension     Gout     Hyperlipidemia 3/7/2016    Lateral meniscal tear 5/31/2016    Lumbar stenosis 6/17/2015     Morbid obesity 8/15/2013    Paroxysmal atrial fibrillation 2014     Not on anticoagulation    Peripheral neuropathy 2013    Personal history of fall 2014    Personal history of gastric ulcer 3/19/2013    Sarcoidosis, lung 2009     Diagnosed in  with ocular manifestation, on 4L home O2 and PO steroids    Secondary pulmonary hypertension 2015    Seizure disorder 3/19/2013    Shingles 2013    Spondylarthrosis 2015     Past Surgical History   Procedure Laterality Date     section, classic      Abdominal surgery      Vascular surgery       Family History   Problem Relation Age of Onset    Kidney failure Mother     Hypertension Mother     Diabetes Mother     Coronary artery disease Father     Hypertension Father     Diabetes Father     Lupus Sister     Diabetes Maternal Grandmother     Asthma Neg Hx     Emphysema Neg Hx     Melanoma Neg Hx     Psoriasis Neg Hx      Social History     Social History    Marital status: Single     Spouse name: N/A    Number of children: N/A    Years of education: N/A     Occupational History    Not on file.     Social History Main Topics    Smoking status: Former Smoker     Packs/day: 0.50     Years: 10.00     Types: Cigarettes     Quit date: 1994    Smokeless tobacco: Never Used    Alcohol use No    Drug use: No    Sexual activity: No     Other Topics Concern    Not on file     Social History Narrative    Lives with boyfriend/partner who helps with her care.     Plans to travel to [place] for 2 weeks in 2016     Current Outpatient Prescriptions on File Prior to Visit   Medication Sig    ACZONE 5 % topical gel Apply topically once daily.     allopurinol (ZYLOPRIM) 100 MG tablet Take 1 tablet (100 mg total) by mouth once daily.    amiodarone (PACERONE) 200 MG Tab Take 1 tablet (200 mg total) by mouth every morning.    ammonium lactate (LAC-HYDRIN) 12 % lotion Apply topically as needed (for scars on arms).      aspirin 81 MG Chew Take 81 mg by mouth every morning.    atorvastatin (LIPITOR) 80 MG tablet Take 80 mg by mouth once daily.     capsicum 0.075% (ZOSTRIX) 0.075 % topical cream Apply topically 3 (three) times daily. For neuropathic pain of feet    gabapentin (NEURONTIN) 100 MG capsule TAKE ONE CAPSULE BY MOUTH THREE TIMES DAILY (Patient taking differently: TAKE ONE CAPSULE BY MOUTH THREE TIMES DAILY-noon, evening, bedtime)    levetiracetam (KEPPRA) 500 MG Tab TAKE 1 TABLET BY MOUTH TWICE DAILY.    melatonin 5 mg Tab Take 1 tablet by mouth nightly.    midodrine (PROAMATINE) 10 MG tablet     midodrine (PROAMATINE) 5 MG Tab     NEPHRO-LINDA 0.8 mg Tab TK 1 T PO  QD    ondansetron (ZOFRAN-ODT) 8 MG TbDL Take 1 tablet (8 mg total) by mouth every 8 (eight) hours as needed.    oxycodone-acetaminophen (PERCOCET) 7.5-325 mg per tablet Take 1 tablet by mouth every 4 (four) hours as needed.    predniSONE (DELTASONE) 10 MG tablet TAKE 3 TABLETS BY MOUTH FOR 7 DAYS, THEN 2 TABLETS FOR 7 DAYS, THEN 1 TABLET EVERY DAY AFTER.    RENVELA 800 mg Tab TAKE 1 TABLET BY MOUTH THREE TIMES DAILY WITH MEALS    tizanidine 4 mg Cap Take 4 mg by mouth 2 (two) times daily as needed. (Patient taking differently: Take 4 mg by mouth daily as needed (for muscle spasms.). )    warfarin (COUMADIN) 5 MG tablet TAKE 1 TABLET(5 MG) BY MOUTH DAILY     No current facility-administered medications on file prior to visit.        REVIEW OF SYSTEMS:  General: negative; ENT: negative; Allergy and Immunology: negative; Hematological and Lymphatic: Negative; Endocrine: negative; Respiratory: no cough, shortness of breath, or wheezing; Cardiovascular: no chest pain or dyspnea on exertion; Gastrointestinal: no abdominal pain/back, change in bowel habits, or bloody stools; Genito-Urinary: no dysuria, trouble voiding, or hematuria; Musculoskeletal: no pain, numbness, to LUE  Neurological: no TIA or stroke symptoms; Psychiatric: no nervousness,  anxiety or depression.    PHYSICAL EXAM:   Vitals:    16 1532   BP: (!) 81/57   Pulse: (!) 52   Temp: 98.9 °F (37.2 °C)       General appearance:  Alert, well-appearing, and in no distress.  Oriented to person, place, and time   Neurological:  Normal speech, no focal findings noted; CN II - XII grossly intact           Musculoskeletal: Digits/nail without cyanosis/clubbing.  Normal muscle strength/tone.                 Neck: Supple, no significant adenopathy; thyroid is not enlarged                Chest:  Clear to auscultation, symmetric air entry      No use of accessory muscles             Cardiac: Normal rate and regular rhythm, S1 and S2 normal; PMI non-displaced          Abdomen: Soft, nontender, nondistended, no masses or organomegaly      no rebound tenderness noted      Extremities:   LUE AVG with palpable thrill, no increased pulsatility, 2+ distal radial pulse      LAB RESULTS:  Lab Results   Component Value Date    K 4.5 2016    K 4.6 2016    K 5.1 2016    CREATININE 6.5 (H) 2016    CREATININE 10.2 (H) 2016    CREATININE 7.6 (H) 2016     Lab Results   Component Value Date    WBC 11.09 2016    WBC 10.38 2016    WBC 11.01 2016    HCT 37.5 2016    HCT 31.4 (L) 2016    HCT 30.1 (L) 2016     2016     2016     2016     Lab Results   Component Value Date    HGBA1C 5.7 2016    HGBA1C 6.7 (H) 10/15/2015    HGBA1C 5.5 06/15/2015     IMAGIN18 Hemodialysis US:  arterial anastomosis-387  proximal graft-177  mid graft-248  distal graft-169  venous anastomosis-177  venous outflow(stent)-155  venous confluence- 191                   volume flow: 1467 ml./min.   Velocity elevation of 636 cm/s is noted in the proximal graft with a visual narrowing - suggestive of a focal stenosis.       17 Hemodialysis US:  arterial anastomosis-360  proximal graft-266  mid graft-215  distal  graft-183  venous anastomosis-173  venous outflow(stent)-90  venous outflow(distal stent)-320  venous confluence- 408                    volume flow: 1328 ml./min.     Overall Impression:  Color flow duplex exam reveals a patent left hemodialysis AV graft and outflow stents. Velocity elevations were noted within the most distal stent and at the venous confluence. However, these elevations do not appear hemodyanmically significant or flow   limiting. Volume flow at mid graft level measures 1328 ml./min.    IMP/PLAN:  55 y.o. female with HTN, HLD, CHF (EF 20%), COPD on Home O2, Paroxysmal Afib (On Coumadin anticoagulation), Seizure disorder, ESRD on HD via LUE AVG who is here today for evaluation of her LUE upper arm AV graft (placed by Dr. Villafana 2/3/16). Given her history of AV graft thrombosis and multiple interventions, will plan for a fistulogram with possible intervention and reflux angiogram to examine arterial anastomosis.     1) will plan for fistulogram with possible intervention due to proximal graft stenosis  2) consented   3) continue coumadin    Torrey Villafana MD  Vascular & Endovascular Surgery

## 2018-02-05 RX ORDER — WARFARIN SODIUM 5 MG/1
TABLET ORAL
Qty: 40 TABLET | Refills: 0 | Status: SHIPPED | OUTPATIENT
Start: 2018-02-05 | End: 2018-06-11 | Stop reason: SDUPTHER

## 2018-02-06 NOTE — OP NOTE
Brief Operative Note  Date: 02/06/2018    Surgeon(s) and Role:     * Torrey Villafana MD - Primary    Pre-op Diagnosis:  Complication of arteriovenous dialysis fistula, subsequent encounter [T82.9XXD];     Post-op Diagnosis:  Same    Procedure(s):  1) PTA arterial inflow, LUE AVG (7 x 40 [place])  2) LUE fistulagram  3) LUE arteriogram    Surgeon: Torrey Villafana MD  Vascular & Endovascular Surgery     Assistant: none    Anesthesia: RN IV Sedation    Findings/Key Components:  Successful treatment of severe stenosis, arterial inflow, LUE AVG    EBL: Minimal         Specimens     None          I attest to being present for the procedure and performing the case.  Torrey Villafana MD  Vascular & Endovascular Surgery     Discharge Note  SUMMARY    Admit Date: 2/2/2018    Attending Physician: Torrey Villafana MD  Vascular & Endovascular Surgery       Discharge Physician: Torrey Villafana MD  Vascular & Endovascular Surgery       Discharge Date: 02/06/2018    Final Diagnosis: Complication of arteriovenous dialysis fistula, subsequent encounter [T82.9XXD]    Disposition: Home or self-care    Patient Instructions:   Discharge Medication List as of 2/2/2018 11:22 AM      CONTINUE these medications which have NOT CHANGED    Details   ACZONE 5 % topical gel Apply to face every morning, Starting 1/6/2016, Until Discontinued, Historical Med      atorvastatin (LIPITOR) 80 MG tablet TAKE 1 TABLET(80 MG) BY MOUTH EVERY NIGHT, Normal      blood sugar diagnostic Strp 1 strip by Misc.(Non-Drug; Combo Route) route 2 (two) times daily with meals., Starting Thu 2/1/2018, Print      blood-glucose meter kit Use as instructed, Print      cholecalciferol, vitamin D3, 1,000 unit capsule Take 1 capsule (1,000 Units total) by mouth once daily., Starting 3/8/2017, Until Discontinued, No Print      clopidogrel (PLAVIX) 75 mg tablet 75 mg once daily. , Starting Thu 8/10/2017, Historical Med      diclofenac sodium (SOLARAZE) 3 % gel  Historical Med      doxepin (ZONALON) 5 % cream Starting Mon 10/9/2017, Historical Med      econazole nitrate 1 % cream APPLY TOPICALLY TWICE DAILY AS NEEDED FOR FUNGAL SKIN INFECTIONS. USE FOR 2 TO 4 WEEKS, Normal      fludrocortisone (FLORINEF) 0.1 mg Tab Take 100 mcg by mouth 2 (two) times daily., Until Discontinued, Historical Med      ketorolac 0.5% (ACULAR) 0.5 % Drop instill one drop into both eyes every morning, Historical Med      lancets Misc 1 lancet by Misc.(Non-Drug; Combo Route) route 2 (two) times daily with meals., Starting Thu 2/1/2018, Print      lancing device Misc 1 Device by Misc.(Non-Drug; Combo Route) route 2 (two) times daily with meals., Starting Thu 2/1/2018, Until Fri 2/1/2019, Print      levETIRAcetam (KEPPRA) 500 MG Tab Take 1 tablet (500 mg total) by mouth 2 (two) times daily., Starting Tue 12/19/2017, Normal      midodrine (PROAMATINE) 5 MG Tab Take 2 tablets (10 mg total) by mouth every Mon, Wed, Fri, Sun., Starting 2/19/2017, Until Mon 2/19/18, Normal      nortriptyline (PAMELOR) 25 MG capsule Take 1 capsule (25 mg total) by mouth every evening., Starting Wed 9/6/2017, Until Thu 9/6/2018, Normal      omeprazole (PRILOSEC) 20 MG capsule TAKE 1 CAPSULE(20 MG) BY MOUTH EVERY DAY, Normal      polyethylene glycol (GLYCOLAX) 17 gram/dose powder Take 17 g by mouth once daily. Dissolve in juice., Starting 2/11/2017, Until Discontinued, Normal      prednisoLONE acetate (PRED FORTE) 1 % DrpS INSTILL ONE DROP INTO  LEFT EYE THREE TIMES A DAY, Historical Med      predniSONE (DELTASONE) 10 MG tablet TAKE 1 TABLET BY MOUTH EVERY DAY WITH BREAKFAST, Normal      RENVELA 800 mg Tab TAKE 1 TABLET BY MOUTH THREE TIMES DAILY WITH MEALS, Normal      SENSIPAR 30 mg Tab daily with breakfast. , Starting Tue 5/23/2017, Historical Med      tiZANidine 4 mg Cap TAKE 1 CAPSULE BY MOUTH EVERY DAY AS NEEDED FOR MUSCLE SPASM, Normal      warfarin (COUMADIN) 5 MG tablet Take 1 tablet (5 mg total) by mouth Daily. Per  [facility], Starting 4/3/2017, Until Discontinued, Normal             Diet:  Resume pre-operative diet    Activity:  Ad rosa    Follow-up:  Follow-up in clinic with Dr Villafana within 1-2 weeks; please call clinic nurse at

## 2018-02-07 ENCOUNTER — CLINICAL SUPPORT (OUTPATIENT)
Dept: DIABETES | Facility: CLINIC | Age: 58
End: 2018-02-07
Payer: MEDICARE

## 2018-02-07 ENCOUNTER — ANTI-COAG VISIT (OUTPATIENT)
Dept: CARDIOLOGY | Facility: CLINIC | Age: 58
End: 2018-02-07
Payer: MEDICARE

## 2018-02-07 DIAGNOSIS — E11.9 TYPE 2 DIABETES MELLITUS WITHOUT COMPLICATION, WITHOUT LONG-TERM CURRENT USE OF INSULIN: ICD-10-CM

## 2018-02-07 DIAGNOSIS — Z79.01 CURRENT USE OF LONG TERM ANTICOAGULATION: Primary | ICD-10-CM

## 2018-02-07 LAB — INR PPP: 4.6 (ref 2–3)

## 2018-02-07 PROCEDURE — 85610 PROTHROMBIN TIME: CPT | Mod: QW,S$GLB,,

## 2018-02-07 PROCEDURE — 99211 OFF/OP EST MAY X REQ PHY/QHP: CPT | Mod: S$GLB,,,

## 2018-02-07 PROCEDURE — G0108 DIAB MANAGE TRN  PER INDIV: HCPCS | Mod: S$GLB,,, | Performed by: DIETITIAN, REGISTERED

## 2018-02-07 NOTE — PROGRESS NOTES
INR elevated today. Patient states that she has bruising on her body and she bleed more yesterday after dialysis. She also states that she eats greens once weekly and did not eat her serving yet this week, she is going to eat her serving tonight. Will hold coumadin today and decrease weekly dose until follow-up next week. Advised her to call with any changes or concerns.

## 2018-02-07 NOTE — OP NOTE
DATE OF PROCEDURE:  02/02/2018    PREOPERATIVE DIAGNOSIS:  AV graft stenosis.    POSTOPERATIVE DIAGNOSIS:  AV graft stenosis.    PROCEDURES PERFORMED:  1.  Percutaneous transluminal angioplasty of arterial inflow aspect of left   upper extremity AV graft with a 7 x 40 [place] balloon.  2.  Left upper extremity fistulogram.  3.  Left upper extremity arteriogram.    SURGEON:  Torrey Villafana M.D.    ASSISTANT:  None.    ANESTHESIA:  MAC plus local.    ESTIMATED BLOOD LOSS:  Minimal.    DESCRIPTION OF PROCEDURE:  The patient is a 57-year-old female with a long-term   indwelling left upper extremity AV graft, who has undergone multiple   percutaneous interventions to maintain patency of the graft over the past two   years.  She presented to my clinic recently with an ultrasound suggestive of a   severe inflow stenosis and moderately reduced overall graft flow volume.  For   this reason, intervention was planned.    The patient was identified as Sisi Medel and brought to the   Catheterization Laboratory.  She was placed in the supine position and her left   upper extremity was prepped and draped in standard sterile fashion.  After a   subcutaneous wheal of 1% lidocaine was raised over the mid graft, a   micropuncture needle was introduced through the graft.  A micropuncture wire was   advanced under fluoroscopic guidance from the graft into the left brachial   artery.  A micropuncture sheath was inserted and the inner cannula and   micropuncture wire were removed.  A 0.035 stiff-angled Glidewire was then   maneuvered into the ulnar artery under fluoroscopic guidance and a 6-[place]   short working sheath was placed into the graft.  A 0.035 4-[place] angled glide   catheter was placed into the arterial circulation over the Glidewire, which was   then removed.  Left upper extremity angiogram showed patent left upper extremity   arterial flow and a patent left upper extremity AV graft with a severe stenosis    approximately 4 cm distal to the arterial inflow aspect of the graft.  For this   reason, an intervention was planned.  Using roadmap imaging, a 7 x 40 [place]   balloon was positioned to straddle the area of stenosis and inflated with an   inflation held for 3 minutes.  This was repeated in sequence.  A followup   fistulogram demonstrated resolution of the stenosis to less than 15%.    A thrill was palpable over the inflow aspect of the graft.  At this time, stitch   was placed and wires and catheters were removed and the 6-Niuean sheath was   then removed and the stitch tied down.  Hemostasis was evident at completion of   the case.    Torrey NICE M.D., was present for the entirety of the operation and   monitored anesthesia care for a total of 45 minutes.  Please consult the nurse's   chart for exact doses of medications.      KARIE/RENUKA  dd: 02/06/2018 17:01:13 (CST)  td: 02/06/2018 23:35:00 (CST)  Doc ID   #[id number]  Job ID #[id number]    CC:

## 2018-02-12 ENCOUNTER — CLINICAL SUPPORT (OUTPATIENT)
Dept: ELECTROPHYSIOLOGY | Facility: CLINIC | Age: 58
End: 2018-02-12
Payer: MEDICARE

## 2018-02-12 DIAGNOSIS — Z95.0 CARDIAC PACEMAKER IN SITU: ICD-10-CM

## 2018-02-12 DIAGNOSIS — I44.2 CHB (COMPLETE HEART BLOCK): ICD-10-CM

## 2018-02-12 DIAGNOSIS — I48.21 PERMANENT ATRIAL FIBRILLATION: ICD-10-CM

## 2018-02-12 PROCEDURE — 93294 REM INTERROG EVL PM/LDLS PM: CPT | Mod: S$GLB,,, | Performed by: INTERNAL MEDICINE

## 2018-02-12 PROCEDURE — 93296 REM INTERROG EVL PM/IDS: CPT | Mod: S$GLB,,, | Performed by: INTERNAL MEDICINE

## 2018-02-12 NOTE — PROGRESS NOTES
Diabetes Education  Author: Zarina Watson RD  Date: 2/12/2018    Diabetes Education Visit  Diabetes Education Record Assessment/Progress: Initial    Diabetes Type  Diabetes Type : Type II    Diabetes History  Diabetes Diagnosis: 0-1 year    Nutrition  Meal Planning:  (# of meals depends on appetite; if she skips a meal, she will usually still eat pickles)  What type of beverages do you drink?: regular soda/tea (FR 3.2L (always goes over); koolaid; water; zero-calorie drinks)    Meal Plan 24 Hour Recall - Breakfast:  (grits, eggs, vazquez; OR pickle)  Meal Plan 24 Hour Recall - Lunch:  (usually skips)  Meal Plan 24 Hour Recall - Dinner:  (chinese food: shrimp fried rice, boneless chicken)    Monitoring   Self Monitoring :  (doesnt have meter)  Blood Glucose Logs: No    Exercise   Exercise Type: none    Current Diabetes Treatment   Current Treatment:  (no DM meds)    Social History  Preferred Learning Method: Face to Face  Primary Support: Self, Spouse ( present)  Smoking Status: Ex Smoker  Alcohol Use: Rarely    Barriers to Change  Barriers to Change: None (motivation and multiple comorbidities)  Learning Challenges : None    Readiness to Learn   Readiness to Learn : Acceptance    Cultural Influences  Cultural Influences: No      Diabetes Education Assessment/Progress  Diabetes Disease Process (diabetes disease process and treatment options): Discussion, Individual Session (Discussed disease process; reviewed effects of daily steroid use on BG's. Also discussed effects of dialysis and CHF in congruence with new diabetes dx.)    Nutrition (Incorporating nutritional management into one's lifestyle): Discussion, Needs Instruction, Needs Review, Individual Session, Written Materials Provided, Instructed (Pt does not regularly interactive with dialysis RD. Her diet is very high in sodium, and she regularly consumes SSB. She is on O2, HD and with CHF, seems to high very little motivation to make dietary or lifestyle  changes. Emphasized importance of #1, eliminating all SSB and pickles from diet. Discussed carb vs non-carb foods and touched on appropriate amounts to have at meals and snacks. Reviewed list of low potassium, phos, and sodium foods, as well as foods to avoid. Reviewed fluid restriction and instructed on how lowering salt in diet will help decrease pt's thirst - she currently always goes over FR.)    Physical Activity (incorporating physical activity into one's lifestyle): Not Covered/Deferred    Medications (states correct name, dose, onset, peak, duration, side effects & timing of meds): Discussion, Individual Session (Currently not on any DM meds, A1c 6.6. Discussed need to make dietary and lifestyle changes in order to avoid future medication. )    Monitoring (monitoring blood glucose/other parameters & using results): Discussion, Individual Session, Comprehends Key Points (Needs meter; has been trying to acquire one from [employer]. Discussed option of [employer] brand meter. Discussed BG goals and instructed pt to begin testing BG once daily, at least 2-3 times per week. )    Acute Complications (preventing, detecting, and treating acute complications): Individual Session, Discussion (Not currently on DM meds, no true risk of hypo at this time.)    Chronic Complications (preventing, detecting, and treating chronic complications): Discussion, Individual Session, Instructed, Comprehends Key Points (Discussed list of possible future complications, as well as worsening of ESRD and CHF with uncontrolled diabetes.)    Cognitive (knowledge of self-management skills, functional health literacy): Discussion, Individual Session (Pt has capability of attaining knowledge of DM lifestyle management, but highly lacks any motivation at this time. LIkely needs further instruction on negative outcomes of uncontrolled diabetes.)    Behavioral (readiness for change, lifestyle practices, self-care behaviors): Needs Instruction,  Needs Review      Goals  Patient has selected/evaluated goals during today's session: Yes, selected    Healthy Eating: Set (Eliminate SSB and all pickles from diet)  Start Date: 02/07/18         Diabetes Care Plan/Intervention  Education Plan/Intervention: Individual Follow-Up DSMT      Diabetes Meal Plan  Restrictions: Restricted Carbohydrate, Low Sodium, Low Potassium, Fluid Restriction      Education Units of Time   Time Spent: 60 min      Health Maintenance Topics with due status: Not Due       Topic Last Completion Date    Colonoscopy 10/01/2011    Pneumococcal PPSV23 (Medium Risk) 06/14/2016    Pap Smear with HPV Cotest 08/16/2016    DEXA SCAN 04/07/2017    Mammogram 08/25/2017    Foot Exam 11/07/2017    Lipid Panel 12/08/2017    Hemoglobin A1c 01/02/2018     Health Maintenance Due   Topic Date Due    TETANUS VACCINE  09/11/1979    Eye Exam  11/27/2016    Influenza Vaccine  08/01/2017

## 2018-02-15 ENCOUNTER — OFFICE VISIT (OUTPATIENT)
Dept: PODIATRY | Facility: CLINIC | Age: 58
End: 2018-02-15
Payer: MEDICARE

## 2018-02-15 ENCOUNTER — ANTI-COAG VISIT (OUTPATIENT)
Dept: CARDIOLOGY | Facility: CLINIC | Age: 58
End: 2018-02-15
Payer: MEDICARE

## 2018-02-15 VITALS
DIASTOLIC BLOOD PRESSURE: 76 MMHG | HEART RATE: 69 BPM | BODY MASS INDEX: 40.32 KG/M2 | WEIGHT: 266 LBS | SYSTOLIC BLOOD PRESSURE: 104 MMHG | HEIGHT: 68 IN

## 2018-02-15 DIAGNOSIS — I73.9 PAD (PERIPHERAL ARTERY DISEASE): Primary | ICD-10-CM

## 2018-02-15 DIAGNOSIS — L97.519 TOE ULCER, RIGHT, WITH UNSPECIFIED SEVERITY: ICD-10-CM

## 2018-02-15 DIAGNOSIS — L97.529 TOE ULCER, LEFT, WITH UNSPECIFIED SEVERITY: ICD-10-CM

## 2018-02-15 DIAGNOSIS — Z79.01 CURRENT USE OF LONG TERM ANTICOAGULATION: Primary | ICD-10-CM

## 2018-02-15 DIAGNOSIS — R60.0 BILATERAL LOWER EXTREMITY EDEMA: ICD-10-CM

## 2018-02-15 LAB — INR PPP: 2 (ref 2–3)

## 2018-02-15 PROCEDURE — 99213 OFFICE O/P EST LOW 20 MIN: CPT | Mod: 25,S$GLB,, | Performed by: PODIATRIST

## 2018-02-15 PROCEDURE — 85610 PROTHROMBIN TIME: CPT | Mod: QW,S$GLB,, | Performed by: INTERNAL MEDICINE

## 2018-02-15 PROCEDURE — 99999 PR PBB SHADOW E&M-EST. PATIENT-LVL III: CPT | Mod: PBBFAC,,, | Performed by: PODIATRIST

## 2018-02-15 PROCEDURE — 3008F BODY MASS INDEX DOCD: CPT | Mod: S$GLB,,, | Performed by: PODIATRIST

## 2018-02-15 PROCEDURE — 11721 DEBRIDE NAIL 6 OR MORE: CPT | Mod: Q8,S$GLB,, | Performed by: PODIATRIST

## 2018-02-15 NOTE — PROGRESS NOTES
Subjective:      Patient ID: Sisi Medel is a 57 y.o. female.    Chief Complaint: Callouses (lt great toe rt 2 nd toe) and dryness (both feet)    Sisi is a 57 y.o. female who presents to the clinic for evaluation and treatment of high risk feet. Sisi has a past medical history of Anemia in ESRD (end-stage renal disease) (3/7/2016); Anticoagulant long-term use; Cervical radiculopathy (7/20/2015); CHF (congestive heart failure); Chronic combined systolic and diastolic heart failure (6/14/2013); Chronic respiratory failure with hypoxia (04/22/2013); Closed fracture of proximal end of right fibula (6/27/2016); Complications due to renal dialysis device, implant, and graft; DDD (degenerative disc disease), lumbar (6/17/2015); Dependence on renal dialysis; Diabetes mellitus type II, controlled (12/8/2017); ESRD on hemodialysis (2/23/2016); Essential hypertension; Gout; Lateral meniscal tear (5/31/2016); Lumbar stenosis (6/17/2015); Pacemaker; Paroxysmal atrial fibrillation (5/16/2014); Peripheral neuropathy (11/13/2013); Personal history of gastric ulcer (3/19/2013); Sarcoidosis, lung (2009); Secondary pulmonary hypertension (4/7/2015); Seizure disorder (3/19/2013); Seizures; Shingles (11/13/2013); and Thyroid disease. The patient's chief complaint is follow up of multiple previous wounds on the right foot/leg which healed at last visit. She has once again been trimming her nails herself and causing wounds to the tips of both great toes. She also relates new wounds on top of both of her great toe joints which she is not sure how these started. They are dry and not bleeding and do not hurt her. Has been applying vicks on everything. No other pedal complaints.   This patient has documented high risk feet requiring routine maintenance secondary to peripheral neuropathy.    PCP: Carlos A Caputo MD    Date Last Seen by PCP:   Chief Complaint   Patient presents with    Callouses     lt great toe rt 2 nd toe     dryness     both feet         Current shoe gear:  Affected Foot: Football, UNNA boot, darco     Unaffected Foot: Slip-on shoes    Last encounter in this department: Visit date not found    Hemoglobin A1C   Date Value Ref Range Status   01/02/2018 6.6 (H) 4.0 - 5.6 % Final     Comment:     According to ADA guidelines, hemoglobin A1c <7.0% represents  optimal control in non-pregnant diabetic patients. Different  metrics may apply to specific patient populations.   Standards of Medical Care in Diabetes-2016.  For the purpose of screening for the presence of diabetes:  <5.7%     Consistent with the absence of diabetes  5.7-6.4%  Consistent with increasing risk for diabetes   (prediabetes)  >or=6.5%  Consistent with diabetes  Currently, no consensus exists for use of hemoglobin A1c  for diagnosis of diabetes for children.  This Hemoglobin A1c assay has significant interference with fetal   hemoglobin   (HbF). The results are invalid for patients with abnormal amounts of   HbF,   including those with known Hereditary Persistence   of Fetal Hemoglobin. Heterozygous hemoglobin variants (HbAS, HbAC,   HbAD, HbAE, HbA2) do not significantly interfere with this assay;   however, presence of multiple variants in a sample may impact the %   interference.     12/07/2017 6.5 (H) 4.0 - 5.6 % Final     Comment:     According to ADA guidelines, hemoglobin A1c <7.0% represents  optimal control in non-pregnant diabetic patients. Different  metrics may apply to specific patient populations.   Standards of Medical Care in Diabetes-2016.  For the purpose of screening for the presence of diabetes:  <5.7%     Consistent with the absence of diabetes  5.7-6.4%  Consistent with increasing risk for diabetes   (prediabetes)  >or=6.5%  Consistent with diabetes  Currently, no consensus exists for use of hemoglobin A1c  for diagnosis of diabetes for children.  This Hemoglobin A1c assay has significant interference with fetal   hemoglobin    (HbF). The results are invalid for patients with abnormal amounts of   HbF,   including those with known Hereditary Persistence   of Fetal Hemoglobin. Heterozygous hemoglobin variants (HbAS, HbAC,   HbAD, HbAE, HbA2) do not significantly interfere with this assay;   however, presence of multiple variants in a sample may impact the %   interference.     09/06/2017 6.8 (H) 4.0 - 5.6 % Final     Comment:     According to ADA guidelines, hemoglobin A1c <7.0% represents  optimal control in non-pregnant diabetic patients. Different  metrics may apply to specific patient populations.   Standards of Medical Care in Diabetes-2016.  For the purpose of screening for the presence of diabetes:  <5.7%     Consistent with the absence of diabetes  5.7-6.4%  Consistent with increasing risk for diabetes   (prediabetes)  >or=6.5%  Consistent with diabetes  Currently, no consensus exists for use of hemoglobin A1c  for diagnosis of diabetes for children.  This Hemoglobin A1c assay has significant interference with fetal   hemoglobin   (HbF). The results are invalid for patients with abnormal amounts of   HbF,   including those with known Hereditary Persistence   of Fetal Hemoglobin. Heterozygous hemoglobin variants (HbAS, HbAC,   HbAD, HbAE, HbA2) do not significantly interfere with this assay;   however, presence of multiple variants in a sample may impact the %   interference.       Past Medical History:   Diagnosis Date    Anemia in ESRD (end-stage renal disease) 3/7/2016    Anticoagulant long-term use     Cervical radiculopathy 7/20/2015    CHF (congestive heart failure)     Chronic combined systolic and diastolic heart failure 6/14/2013    EF 20% 2013, improved with Medical therapy, negative PET 2013    Chronic respiratory failure with hypoxia 04/22/2013    On home oxygen at 2-5 liters    Closed fracture of proximal end of right fibula 6/27/2016    Complications due to renal dialysis device, implant, and graft     DDD  (degenerative disc disease), lumbar 2015    Dependence on renal dialysis     Diabetes mellitus type II, controlled 2017    ESRD on hemodialysis 2016    Essential hypertension     Gout     Lateral meniscal tear 2016    Lumbar stenosis 2015    Pacemaker     Paroxysmal atrial fibrillation 2014    Not on anticoagulation    Peripheral neuropathy 2013    Personal history of gastric ulcer 3/19/2013    Sarcoidosis, lung 2009    Diagnosed in  with ocular manifestation, on 4L home O2 and PO steroids    Secondary pulmonary hypertension 2015    Seizure disorder 3/19/2013    Seizures     Shingles 2013    Thyroid disease        Past Surgical History:   Procedure Laterality Date    ABDOMINAL SURGERY      CARDIAC PACEMAKER PLACEMENT       SECTION      x2    OTHER SURGICAL HISTORY      loop recorder implant    stent to fistula      VASCULAR SURGERY      fistula to L upper arm        Family History   Problem Relation Age of Onset    Kidney failure Mother     Hypertension Mother     Diabetes Mother     Coronary artery disease Father     Hypertension Father     Diabetes Father     Lupus Sister     Diabetes Maternal Grandmother     Colon cancer Neg Hx     Ovarian cancer Neg Hx     Breast cancer Neg Hx        Social History     Social History    Marital status: Single     Spouse name: N/A    Number of children: N/A    Years of education: N/A     Social History Main Topics    Smoking status: Former Smoker     Packs/day: 0.50     Years: 10.00     Types: Cigarettes     Quit date: 1994    Smokeless tobacco: Never Used    Alcohol use No      Comment: rarely    Drug use: No    Sexual activity: Not Currently     Other Topics Concern    Are You Pregnant Or Think You May Be? No    Breast-Feeding No     Social History Narrative    Lives with boyfriend/partner who helps with her care.            Current Outpatient Prescriptions   Medication  Sig Dispense Refill    atorvastatin (LIPITOR) 80 MG tablet TAKE 1 TABLET(80 MG) BY MOUTH EVERY NIGHT 90 tablet 3    blood sugar diagnostic Strp 1 strip by Misc.(Non-Drug; Combo Route) route 2 (two) times daily with meals. 100 strip 3    blood-glucose meter kit Use as instructed 1 each 0    cholecalciferol, vitamin D3, 1,000 unit capsule Take 1 capsule (1,000 Units total) by mouth once daily. (Patient taking differently: Take 1,000 Units by mouth every evening. )  0    diclofenac sodium (SOLARAZE) 3 % gel       fludrocortisone (FLORINEF) 0.1 mg Tab Take 100 mcg by mouth 2 (two) times daily.      ketorolac 0.5% (ACULAR) 0.5 % Drop instill one drop into both eyes every morning  3    lancets Misc 1 lancet by Misc.(Non-Drug; Combo Route) route 2 (two) times daily with meals. 100 each 3    lancing device Misc 1 Device by Misc.(Non-Drug; Combo Route) route 2 (two) times daily with meals. 1 each 0    levETIRAcetam (KEPPRA) 500 MG Tab Take 1 tablet (500 mg total) by mouth 2 (two) times daily. 180 tablet 3    midodrine (PROAMATINE) 5 MG Tab Take 2 tablets (10 mg total) by mouth every Mon, Wed, Fri, Sun. 32 tablet 11    nortriptyline (PAMELOR) 25 MG capsule Take 1 capsule (25 mg total) by mouth every evening. 30 capsule 11    polyethylene glycol (GLYCOLAX) 17 gram/dose powder Take 17 g by mouth once daily. Dissolve in juice. 510 g 0    prednisoLONE acetate (PRED FORTE) 1 % DrpS INSTILL ONE DROP INTO  LEFT EYE THREE TIMES A DAY  3    RENVELA 800 mg Tab TAKE 1 TABLET BY MOUTH THREE TIMES DAILY WITH MEALS 90 tablet 0    SENSIPAR 30 mg Tab daily with breakfast.       warfarin (COUMADIN) 5 MG tablet Take 1 tablet (5 mg total) by mouth Daily. Per [facility] (Patient taking differently: Take 5 mg by mouth every evening. Per [facility]) 90 tablet 3    warfarin (COUMADIN) 5 MG tablet TAKE 1 TABLET BY MOUTH EVERYDAY EXCEPT 1 AND 1/2 TABLETS ON MONDAY AND FRIDAY OR AS DIRECTED BY CLINIC 40 tablet 0    ACZONE  5 % topical gel Apply to face every morning 60 g 2    betamethasone dipropionate (DIPROLENE) 0.05 % ointment AAA bid hand under occlusion 45 g 3    clobetasol 0.05% (TEMOVATE) 0.05 % Oint AAA hand bid - under occlusion qhs 60 g 3    clopidogrel (PLAVIX) 75 mg tablet Take 1 tablet (75 mg total) by mouth once daily. 30 tablet 11    gabapentin (NEURONTIN) 300 MG capsule TK 1 C PO TID  1    omeprazole (PRILOSEC) 20 MG capsule TAKE 1 CAPSULE BY MOUTH ONCE DAILY 30 capsule 4    tiZANidine (ZANAFLEX) 4 MG tablet 1 tablet by mouth every 8-12 hours as needed for muscle spasm 90 tablet 0     No current facility-administered medications for this visit.        Review of patient's allergies indicates:   Allergen Reactions    Bactrim [sulfamethoxazole-trimethoprim] Other (See Comments)     Causes renal failure    Nsaids (non-steroidal anti-inflammatory drug) Other (See Comments)     Renal failure       Review of Systems   Constitution: Negative for chills and fever.   Cardiovascular: Positive for leg swelling. Negative for chest pain and claudication.   Respiratory: Negative for cough and shortness of breath.    Skin: Positive for dry skin, nail changes, poor wound healing and suspicious lesions.   Musculoskeletal:        Ankle pain left   Gastrointestinal: Negative for nausea and vomiting.   Neurological: Positive for numbness and paresthesias.   Psychiatric/Behavioral: Negative for altered mental status.           Objective:      Physical Exam   Constitutional: She is oriented to person, place, and time. She appears well-developed and well-nourished.   HENT:   Head: Normocephalic.   Cardiovascular: Intact distal pulses.    Pedal pulses diminished b/l. Toes cool to touch b/l. 2+ edema noted to b/l LE. No vericosities noted to b/l LEs.      Pulmonary/Chest: No respiratory distress.   Musculoskeletal:   Gastrocnemius equinus noted to b/l ankles with decreased DF noted on exam. MMT 5/5 in DF/PF/Inv/Ev resistance with no  reproduction of pain in any direction. Passive range of motion of ankle and pedal joints is painless. Adequate pedal joint ROM.   No focal area of tenderness. No pain with DF resistance left ankle. Mild pain with palpation of medial and lateral collateral ankle ligaments left ankle. Negative anterior drawer sign left ankle. No ecchymosis noted to left ankle circumferentially.    Feet:   Right Foot:   Protective Sensation: 10 sites tested. 0 sites sensed.   Skin Integrity: Positive for ulcer, skin breakdown and dry skin.   Left Foot:   Protective Sensation: 10 sites tested. 0 sites sensed.   Skin Integrity: Positive for dry skin. Negative for ulcer or skin breakdown.   Neurological: She is alert and oriented to person, place, and time. She has normal strength. A sensory deficit is present.   Light touch, proprioception, and sharp/dull sensation are all intact bilaterally. Protective threshold with the [place] monofilament is completely diminished bilaterally.      Skin: Skin is warm, dry and intact. Lesion noted. No erythema.   Generalized hyperpigmentation to ankle and lower 1/3 of R leg consistent with hemosiderin deposits. Mild dermatitis R lower 1/3 leg. Overall skin is thin, shiny, atrophic to b/l LEs. Loss of pedal hair b/l. Toes are cool to touch b/l.     Open dry wound to dorsal aspects of both great toe IPJs. No drainage. No erythema. Toes cool to touch.     Superficial abrasions noted to distal tips of b/l hallux down to level of dermis at tip of nail. No active bleeding. No SOI.     Diffuse xerosis noted to plantar aspects of b/l foot/heels.     Toenails x 10 cut improperly with sharp edges.      Psychiatric: She has a normal mood and affect. Her behavior is normal. Judgment and thought content normal.   Vitals reviewed.            Assessment:       Encounter Diagnoses   Name Primary?    PAD (peripheral artery disease) Yes    Bilateral lower extremity edema     Toe ulcer, left, with unspecified  severity     Toe ulcer, right, with unspecified severity          Plan:       Sisi was seen today for callouses and dryness.    Diagnoses and all orders for this visit:    PAD (peripheral artery disease)  -     US Lower Extrem Arteries Bilat with VASU; Future    Bilateral lower extremity edema  -     US Lower Extrem Arteries Bilat with VASU; Future    Toe ulcer, left, with unspecified severity  -     US Lower Extrem Arteries Bilat with VASU; Future    Toe ulcer, right, with unspecified severity  -     US Lower Extrem Arteries Bilat with VASU; Future      I counseled the patient on her conditions, their implications and medical management.    With patient's permission, nails were aggressively reduced and debrided 1,2,3,4, 5 R and 1,2, 3,4,5 L and filed to their soft tissue attachment mechanically and with electric , removing all offending nail and debris. Patient tolerated this well and no blood was drawn. Patient reports relief following the procedure.     Superficial abrasions and wounds to both feet with diminished pedal pulses and dry appearance of wounds. Applied iodosorb to all wounds. Additional iodosorb given to patient for daily use for the next 2 weeks. Covered with loose bandaids. No wrapping or compressive dressings. She chasidy continue bandaids at home.     Arterial US/VASU ordered to assess blood flow. Will consider vascular referral if poor results.     F/u 2-3 weeks for reassessment of wounds, sooner PRN

## 2018-02-15 NOTE — PROGRESS NOTES
Quick follow-up for elevated INR and decreased dose 2/7. INR within normal range today. Patient with bruises from use, denies any bleeding or changes. Will maintain weekly dose until follow-up in 2 weeks. Advised her to call with any changes or concerns.

## 2018-02-19 ENCOUNTER — OFFICE VISIT (OUTPATIENT)
Dept: DERMATOLOGY | Facility: CLINIC | Age: 58
End: 2018-02-19
Payer: MEDICARE

## 2018-02-19 DIAGNOSIS — L98.491 CALLOUS ULCER, LIMITED TO BREAKDOWN OF SKIN: Primary | ICD-10-CM

## 2018-02-19 PROCEDURE — 11900 INJECT SKIN LESIONS </W 7: CPT | Mod: S$GLB,,, | Performed by: DERMATOLOGY

## 2018-02-19 PROCEDURE — 3008F BODY MASS INDEX DOCD: CPT | Mod: S$GLB,,, | Performed by: DERMATOLOGY

## 2018-02-19 PROCEDURE — 99213 OFFICE O/P EST LOW 20 MIN: CPT | Mod: 25,S$GLB,, | Performed by: DERMATOLOGY

## 2018-02-19 PROCEDURE — 99999 PR PBB SHADOW E&M-EST. PATIENT-LVL II: CPT | Mod: PBBFAC,,, | Performed by: DERMATOLOGY

## 2018-02-19 RX ORDER — CLOBETASOL PROPIONATE 0.5 MG/G
OINTMENT TOPICAL
Qty: 60 G | Refills: 3 | Status: ON HOLD | OUTPATIENT
Start: 2018-02-19 | End: 2018-08-22

## 2018-02-19 NOTE — LETTER
February 19, 2018      JAKI Cummings, IVETH  [address] [facility] [address]           [facility] - Dermatology  [address]  [facility] [address]  Phone: [phone]  Fax: [phone]          Patient: Sisi Medel   MR Number: [id number]   YOB: 1960   Date of Visit: 2/19/2018       Dear JAKI Cummings:    Thank you for referring Sisi Medel to me for evaluation. Attached you will find relevant portions of my assessment and plan of care.    If you have questions, please do not hesitate to call me. I look forward to following Sisi Medel along with you.    Sincerely,    Loni Shetty MD    Enclosure  CC:  No Recipients    If you would like to receive this communication electronically, please contact [email or web address] or [phone] to request more information on [employer] access.    For providers and/or their staff who would like to refer a patient to [facility], please contact us through our one-stop-shop provider referral line, [facility], at [phone].    If you feel you have received this communication in error or would no longer like to receive these types of communications, please e-mail [email or web address]

## 2018-02-19 NOTE — PROGRESS NOTES
Subjective:       Patient ID:  Sisi Medel is a 57 y.o. female who presents for   Chief Complaint   Patient presents with    Skin Problem     both hands 2-3 months, open wound (creases) both thumbs otc: moist creams     Patient is a 56 yo female with hx of ESRD in hemodialysis currently on Plavix, aspirin and warfarin for a-fib presents for follow up of bruising on her arms and legs for the past year. Last seen 10/30/17. Bruising is progressively worsening since last Dermatology visit per patient. Patient states that she does not recall trauma to affected areas. Lesions are not tender. States that she also bleeds easily sometimes.     Main complaint today: complains of painful dry cracks between first and second digits x 2 months  She has tried betadine x 2 weeks without improvement. She has used an vaseline and an ointment similar to vaseline without improvement. No other treatments tried. She denies excessive hand washing, does not currently work.         SSA neg  SSB neg    Review of Systems   Skin: Positive for rash. Negative for itching.   Hematologic/Lymphatic: Bruises/bleeds easily (on plavix and coumadin and pred and asa).        Objective:    Physical Exam   Constitutional: She appears well-developed and well-nourished. She is obese.  She is on home oxygen.  No distress.   Neurological: She is alert and oriented to person, place, and time. She is not disoriented.   Psychiatric: She has a normal mood and affect.   Skin:   Areas Examined (abnormalities noted in diagram):   Head / Face Inspection Performed  Neck Inspection Performed  Chest / Axilla Inspection Performed  Back Inspection Performed  RUE Inspected  LUE Inspection Performed  RLE Inspected  LLE Inspection Performed                  Diagram Legend     Erythematous scaling macule/papule c/w actinic keratosis       Vascular papule c/w angioma      Pigmented verrucoid papule/plaque c/w seborrheic keratosis      Yellow umbilicated papule  c/w sebaceous hyperplasia      Irregularly shaped tan macule c/w lentigo     1-2 mm smooth white papules consistent with Milia      Movable subcutaneous cyst with punctum c/w epidermal inclusion cyst      Subcutaneous movable cyst c/w pilar cyst      Firm pink to brown papule c/w dermatofibroma      Pedunculated fleshy papule(s) c/w skin tag(s)      Evenly pigmented macule c/w junctional nevus     Mildly variegated pigmented, slightly irregular-bordered macule c/w mildly atypical nevus      Flesh colored to evenly pigmented papule c/w intradermal nevus       Pink pearly papule/plaque c/w basal cell carcinoma      Erythematous hyperkeratotic cursted plaque c/w SCC      Surgical scar with no sign of skin cancer recurrence      Open and closed comedones      Inflammatory papules and pustules      Verrucoid papule consistent consistent with wart     Erythematous eczematous patches and plaques     Dystrophic onycholytic nail with subungual debris c/w onychomycosis     Umbilicated papule    Erythematous-base heme-crusted tan verrucoid plaque consistent with inflamed seborrheic keratosis     Erythematous Silvery Scaling Plaque c/w Psoriasis     See annotation      Assessment / Plan:        Callous ulcer, limited to breakdown of skin  Intralesional Kenalog 10mg/cc (0.4 cc total) injected into 1 lesions on the right hand today after obtaining verbal consent including risk of surrounding hypopigmentation. Patient tolerated procedure well.    Units: 1  NDC for Kenalog 10mg/cc:  6973-4500-18          -     clobetasol 0.05% (TEMOVATE) 0.05 % Oint; AAA hand bid - under occlusion qhs  Dispense: 60 g; Refill: 3             No Follow-up on file.

## 2018-02-24 DIAGNOSIS — K21.00 GASTROESOPHAGEAL REFLUX DISEASE WITH ESOPHAGITIS: ICD-10-CM

## 2018-02-26 RX ORDER — OMEPRAZOLE 20 MG/1
CAPSULE, DELAYED RELEASE ORAL
Qty: 30 CAPSULE | Refills: 4 | Status: SHIPPED | OUTPATIENT
Start: 2018-02-26 | End: 2018-06-30 | Stop reason: SDUPTHER

## 2018-03-01 ENCOUNTER — TELEPHONE (OUTPATIENT)
Dept: DERMATOLOGY | Facility: CLINIC | Age: 58
End: 2018-03-01

## 2018-03-01 NOTE — TELEPHONE ENCOUNTER
----- Message from Halle Medel sent at 3/1/2018 11:10 AM CST -----  Contact: [facility] pharmacy  [phone] from [facility] need to speak with the nurse on above patient call number and ext-in message

## 2018-03-02 ENCOUNTER — ANTI-COAG VISIT (OUTPATIENT)
Dept: CARDIOLOGY | Facility: CLINIC | Age: 58
End: 2018-03-02
Payer: MEDICARE

## 2018-03-02 DIAGNOSIS — Z79.01 CURRENT USE OF LONG TERM ANTICOAGULATION: Primary | ICD-10-CM

## 2018-03-02 LAB — INR PPP: 2.1 (ref 2–3)

## 2018-03-02 PROCEDURE — 85610 PROTHROMBIN TIME: CPT | Mod: QW,S$GLB,, | Performed by: PHARMACIST

## 2018-03-02 NOTE — PROGRESS NOTES
Pt seen by Yesica WARE . I have reviewed her initial findings and agree with her assessment and plan

## 2018-03-02 NOTE — PROGRESS NOTES
INR within therapeutic range today. Patient states that she recently fell and hurt her knee. She is currently taking Tylenol for pain. She denies any bleeding, bruising or other changes at this time. Will maintain current dose and follow up in 3 weeks. Advised patient to notify us of any changes or concerns.

## 2018-03-05 ENCOUNTER — HOSPITAL ENCOUNTER (OUTPATIENT)
Dept: RADIOLOGY | Facility: HOSPITAL | Age: 58
Discharge: HOME | End: 2018-03-05
Attending: PODIATRIST
Payer: MEDICARE

## 2018-03-05 DIAGNOSIS — I73.9 PAD (PERIPHERAL ARTERY DISEASE): ICD-10-CM

## 2018-03-05 DIAGNOSIS — L97.529 TOE ULCER, LEFT, WITH UNSPECIFIED SEVERITY: ICD-10-CM

## 2018-03-05 DIAGNOSIS — L97.519 TOE ULCER, RIGHT, WITH UNSPECIFIED SEVERITY: ICD-10-CM

## 2018-03-05 DIAGNOSIS — R60.0 BILATERAL LOWER EXTREMITY EDEMA: ICD-10-CM

## 2018-03-05 DIAGNOSIS — E11.49 TYPE II DIABETES MELLITUS WITH NEUROLOGICAL MANIFESTATIONS: ICD-10-CM

## 2018-03-05 PROCEDURE — 93925 LOWER EXTREMITY STUDY: CPT | Mod: 26,,, | Performed by: INTERNAL MEDICINE

## 2018-03-05 PROCEDURE — 93922 UPR/L XTREMITY ART 2 LEVELS: CPT | Mod: TC

## 2018-03-05 PROCEDURE — 93922 UPR/L XTREMITY ART 2 LEVELS: CPT | Mod: 26,,, | Performed by: INTERNAL MEDICINE

## 2018-03-12 DIAGNOSIS — M62.838 MUSCLE SPASM: Primary | ICD-10-CM

## 2018-03-12 RX ORDER — FLUDROCORTISONE ACETATE 0.1 MG/1
100 TABLET ORAL 2 TIMES DAILY
Qty: 60 TABLET | Refills: 0 | Status: CANCELLED | OUTPATIENT
Start: 2018-03-12

## 2018-03-12 RX ORDER — TIZANIDINE 4 MG/1
TABLET ORAL
Qty: 90 TABLET | Refills: 0 | Status: SHIPPED | OUTPATIENT
Start: 2018-03-12 | End: 2018-01-01 | Stop reason: SDUPTHER

## 2018-03-12 NOTE — TELEPHONE ENCOUNTER
I haven't previously prescribed this medication for her, and recently she has two different dosing regimens listed (daily vs twice daily).     She is on this medication for secondary adrenal insufficiency due to being on long-term prednisone.    Last endocrine note mentions that this is being managed by her nephrologist.  Please contact patient to clarify how she has been taking this, and whether she is following up with her nephrologist about this medication, or if she plans to follow up with Endocrine again.     Carlos A Caputo MD

## 2018-03-12 NOTE — TELEPHONE ENCOUNTER
She will contact her nephrologist who is not with [facility]. She takes the med bid. Also, she wants a refill on Tizanidine (muscle relaxant) in a tablet form. She does not want the capsule.

## 2018-03-13 NOTE — TELEPHONE ENCOUNTER
I do see that in Epic there appears to be formulary coverage for tablets, and not for capsules. New rx sent.

## 2018-03-14 ENCOUNTER — DOCUMENTATION ONLY (OUTPATIENT)
Dept: DERMATOLOGY | Facility: CLINIC | Age: 58
End: 2018-03-14

## 2018-03-14 NOTE — PROGRESS NOTES
[facility] ins will not cover Clobetasol oint. I gave a list of med that they will cover to Dr. Shetty's nurse.

## 2018-03-19 ENCOUNTER — HOSPITAL ENCOUNTER (OUTPATIENT)
Dept: RADIOLOGY | Facility: HOSPITAL | Age: 58
Discharge: HOME | End: 2018-03-19
Attending: INTERNAL MEDICINE
Payer: MEDICARE

## 2018-03-19 ENCOUNTER — HOSPITAL ENCOUNTER (OUTPATIENT)
Dept: RADIOLOGY | Facility: HOSPITAL | Age: 58
Discharge: HOME | End: 2018-03-19
Attending: PHYSICIAN ASSISTANT
Payer: MEDICARE

## 2018-03-19 ENCOUNTER — HOSPITAL ENCOUNTER (OUTPATIENT)
Dept: PULMONOLOGY | Facility: CLINIC | Age: 58
Discharge: HOME | End: 2018-03-19
Payer: MEDICARE

## 2018-03-19 ENCOUNTER — OFFICE VISIT (OUTPATIENT)
Dept: ORTHOPEDICS | Facility: CLINIC | Age: 58
End: 2018-03-19
Payer: MEDICARE

## 2018-03-19 ENCOUNTER — OFFICE VISIT (OUTPATIENT)
Dept: PULMONOLOGY | Facility: CLINIC | Age: 58
End: 2018-03-19
Payer: MEDICARE

## 2018-03-19 VITALS
HEIGHT: 67 IN | RESPIRATION RATE: 16 BRPM | WEIGHT: 280.13 LBS | DIASTOLIC BLOOD PRESSURE: 80 MMHG | BODY MASS INDEX: 43.95 KG/M2 | WEIGHT: 280 LBS | HEIGHT: 68 IN | BODY MASS INDEX: 42.45 KG/M2 | HEART RATE: 70 BPM | OXYGEN SATURATION: 95 % | SYSTOLIC BLOOD PRESSURE: 128 MMHG

## 2018-03-19 DIAGNOSIS — M25.562 LEFT KNEE PAIN, UNSPECIFIED CHRONICITY: ICD-10-CM

## 2018-03-19 DIAGNOSIS — J20.8 ACUTE BRONCHITIS DUE TO OTHER SPECIFIED ORGANISMS: ICD-10-CM

## 2018-03-19 DIAGNOSIS — E66.01 MORBID OBESITY WITH BMI OF 40.0-44.9, ADULT: Chronic | ICD-10-CM

## 2018-03-19 DIAGNOSIS — I50.42 CHRONIC COMBINED SYSTOLIC AND DIASTOLIC HEART FAILURE: Chronic | ICD-10-CM

## 2018-03-19 DIAGNOSIS — D86.0 PULMONARY SARCOIDOSIS: Chronic | ICD-10-CM

## 2018-03-19 DIAGNOSIS — M25.562 LEFT KNEE PAIN, UNSPECIFIED CHRONICITY: Primary | ICD-10-CM

## 2018-03-19 DIAGNOSIS — J96.11 CHRONIC RESPIRATORY FAILURE WITH HYPOXIA: Chronic | ICD-10-CM

## 2018-03-19 DIAGNOSIS — D86.0 PULMONARY SARCOIDOSIS: Primary | Chronic | ICD-10-CM

## 2018-03-19 DIAGNOSIS — M17.12 PRIMARY OSTEOARTHRITIS OF LEFT KNEE: Primary | ICD-10-CM

## 2018-03-19 LAB
PRE FEV1 FVC: 84
PRE FEV1: 1.32
PRE FVC: 1.58
PREDICTED FEV1 FVC: 79
PREDICTED FEV1: 2.73
PREDICTED FVC: 3.41

## 2018-03-19 PROCEDURE — 99999 PR PBB SHADOW E&M-EST. PATIENT-LVL III: CPT | Mod: PBBFAC,,, | Performed by: INTERNAL MEDICINE

## 2018-03-19 PROCEDURE — 71046 X-RAY EXAM CHEST 2 VIEWS: CPT | Mod: 26,,, | Performed by: RADIOLOGY

## 2018-03-19 PROCEDURE — 99213 OFFICE O/P EST LOW 20 MIN: CPT | Mod: 25,S$GLB,, | Performed by: PHYSICIAN ASSISTANT

## 2018-03-19 PROCEDURE — 73564 X-RAY EXAM KNEE 4 OR MORE: CPT | Mod: 26,LT,, | Performed by: RADIOLOGY

## 2018-03-19 PROCEDURE — 94729 DIFFUSING CAPACITY: CPT | Mod: S$GLB,,, | Performed by: INTERNAL MEDICINE

## 2018-03-19 PROCEDURE — 73562 X-RAY EXAM OF KNEE 3: CPT | Mod: TC,RT

## 2018-03-19 PROCEDURE — 99999 PR PBB SHADOW E&M-EST. PATIENT-LVL II: CPT | Mod: PBBFAC,,, | Performed by: PHYSICIAN ASSISTANT

## 2018-03-19 PROCEDURE — 71046 X-RAY EXAM CHEST 2 VIEWS: CPT | Mod: TC,FY

## 2018-03-19 PROCEDURE — 94010 BREATHING CAPACITY TEST: CPT | Mod: S$GLB,,, | Performed by: INTERNAL MEDICINE

## 2018-03-19 PROCEDURE — 20610 DRAIN/INJ JOINT/BURSA W/O US: CPT | Mod: LT,S$GLB,, | Performed by: PHYSICIAN ASSISTANT

## 2018-03-19 PROCEDURE — 99214 OFFICE O/P EST MOD 30 MIN: CPT | Mod: 25,S$GLB,, | Performed by: INTERNAL MEDICINE

## 2018-03-19 PROCEDURE — 73562 X-RAY EXAM OF KNEE 3: CPT | Mod: 26,59,RT, | Performed by: RADIOLOGY

## 2018-03-19 RX ORDER — PREDNISONE 10 MG/1
20 TABLET ORAL DAILY
Qty: 20 TABLET | Refills: 1 | Status: SHIPPED | OUTPATIENT
Start: 2018-03-19 | End: 2018-03-29

## 2018-03-19 RX ORDER — DOXYCYCLINE HYCLATE 100 MG
100 TABLET ORAL EVERY 12 HOURS
Qty: 20 TABLET | Refills: 1 | Status: SHIPPED | OUTPATIENT
Start: 2018-03-19 | End: 2018-04-03

## 2018-03-19 RX ORDER — METHYLPREDNISOLONE ACETATE 80 MG/ML
80 INJECTION, SUSPENSION INTRA-ARTICULAR; INTRALESIONAL; INTRAMUSCULAR; SOFT TISSUE
Status: COMPLETED | OUTPATIENT
Start: 2018-03-19 | End: 2018-03-19

## 2018-03-19 RX ADMIN — METHYLPREDNISOLONE ACETATE 80 MG: 80 INJECTION, SUSPENSION INTRA-ARTICULAR; INTRALESIONAL; INTRAMUSCULAR; SOFT TISSUE at 04:03

## 2018-03-19 NOTE — PATIENT INSTRUCTIONS
CXR (phone report).  Increase Prednisone dose to 20 mg daily x 7-10 days, then resume 10 mg daily.  Begin Mucinex 600-1200 mg twice daily until congestion improves.  Begin Doxycycline 100 mg twice daily x 10 days.  Continue O2 at 3-4 LPM.  Call/return if resp symptoms fail to improve.

## 2018-03-19 NOTE — PROGRESS NOTES
Subjective:       Patient ID: Sisi Medel is a 57 y.o. female.    Chief Complaint: Shortness of Breath and Cough    HPI HISTORY OF PRESENT ILLNESS:  Mrs. Medel is a 57-year-old former smoker who   comes for assessment of cough and shortness of breath.  She states that she has   been having an increased cough with associated sputum production during the past   several weeks.  At present, her sputum is light yellow in color.  She has not   had any associated fever or pleuritic pain.  She has not had any episodes of   hemoptysis.    Mrs. Medel remains on prednisone 10 mg daily as maintenance treatment for   sarcoidosis.  She also continues to use oxygen at 3 to 4 L per minute.  She is   followed in the [facility]iology [facility] for management of combined systolic and   diastolic heart failure and chronic atrial fibrillation.  Her current   medications include Coumadin and Plavix.    DATA:  I have reviewed the results of pulmonary function studies done earlier   today.  The forced vital capacity is 1.58 L, which is 47% of predicted.  The   FEV1 is 1.32 L or 49% of predicted.  The ratio of these values is 84%.  The   diffusion capacity is 6.2, which is 31% of the expected value.  When today's   study results are compared to previous studies, the vital capacity and FEV, as   well as the diffusion capacity, are all lower.      CB/IN  dd: 03/19/2018 21:16:10 (CDT)  td: 03/20/2018 15:40:29 (CDT)  Doc ID   #[id number]  Job ID #[id number]    CC:       Review of Systems   Constitutional: Positive for fatigue. Negative for fever.   HENT: Positive for postnasal drip and congestion. Negative for sinus pressure and voice change.    Respiratory: Positive for cough, sputum production, shortness of breath and dyspnea on extertion. Negative for wheezing.    Cardiovascular: Positive for leg swelling. Negative for chest pain.   Genitourinary: Negative for difficulty urinating.   Musculoskeletal: Negative for arthralgias and back pain.    Skin: Negative for rash.   Gastrointestinal: Negative for abdominal pain and acid reflux.   Neurological: Negative for dizziness and weakness.   Hematological: Negative for adenopathy.       Objective:      Physical Exam   Constitutional: She is oriented to person, place, and time. She appears well-developed. She is obese.   HENT:   Head: Normocephalic.   Nose: Nose normal.   Mouth/Throat: No oropharyngeal exudate.   Neck: Normal range of motion. No JVD present. No tracheal deviation present. No thyromegaly present.   Cardiovascular: Normal rate, regular rhythm and normal heart sounds.    No murmur heard.  Pulmonary/Chest: Symmetric chest wall expansion. No stridor. She has no wheezes. She has rhonchi (few rhonchi bilaterally). She has no rales. She exhibits no tenderness.   Abdominal: Soft. There is no tenderness.   Musculoskeletal: She exhibits edema (mild LE edema).   Lymphadenopathy:     She has no cervical adenopathy.   Neurological: She is alert and oriented to person, place, and time.   Skin: Skin is warm and dry. No rash noted. No erythema.   Psychiatric: She has a normal mood and affect.   Vitals reviewed.    Personal Diagnostic Review    No flowsheet data found.      Assessment:       1. Pulmonary sarcoidosis    2. Morbid obesity with BMI of 40.0-44.9, adult    3. Chronic combined systolic and diastolic heart failure    4. Chronic respiratory failure with hypoxia    5. Acute bronchitis due to other specified organisms        Outpatient Encounter Prescriptions as of 3/19/2018   Medication Sig Dispense Refill    ACZONE 5 % topical gel Apply to face every morning  2    atorvastatin (LIPITOR) 80 MG tablet TAKE 1 TABLET(80 MG) BY MOUTH EVERY NIGHT 90 tablet 3    blood sugar diagnostic Strp 1 strip by Misc.(Non-Drug; Combo Route) route 2 (two) times daily with meals. 100 strip 3    blood-glucose meter kit Use as instructed 1 each 0    cholecalciferol, vitamin D3, 1,000 unit capsule Take 1 capsule (1,000  Units total) by mouth once daily. (Patient taking differently: Take 1,000 Units by mouth every evening. )  0    clobetasol 0.05% (TEMOVATE) 0.05 % Oint AAA hand bid - under occlusion qhs 60 g 3    clopidogrel (PLAVIX) 75 mg tablet 75 mg once daily.       diclofenac sodium (SOLARAZE) 3 % gel       doxepin (ZONALON) 5 % cream       doxycycline (VIBRA-TABS) 100 MG tablet Take 1 tablet (100 mg total) by mouth every 12 (twelve) hours. 20 tablet 1    econazole nitrate 1 % cream APPLY TOPICALLY TWICE DAILY AS NEEDED FOR FUNGAL SKIN INFECTIONS. USE FOR 2 TO 4 WEEKS 30 g 0    fludrocortisone (FLORINEF) 0.1 mg Tab Take 100 mcg by mouth 2 (two) times daily.      ketorolac 0.5% (ACULAR) 0.5 % Drop instill one drop into both eyes every morning  3    lancets Misc 1 lancet by Misc.(Non-Drug; Combo Route) route 2 (two) times daily with meals. 100 each 3    lancing device Misc 1 Device by Misc.(Non-Drug; Combo Route) route 2 (two) times daily with meals. 1 each 0    levETIRAcetam (KEPPRA) 500 MG Tab Take 1 tablet (500 mg total) by mouth 2 (two) times daily. 180 tablet 3    midodrine (PROAMATINE) 5 MG Tab Take 2 tablets (10 mg total) by mouth every Mon, Wed, Fri, Sun. 32 tablet 11    nortriptyline (PAMELOR) 25 MG capsule Take 1 capsule (25 mg total) by mouth every evening. 30 capsule 11    omeprazole (PRILOSEC) 20 MG capsule TAKE 1 CAPSULE BY MOUTH ONCE DAILY 30 capsule 4    polyethylene glycol (GLYCOLAX) 17 gram/dose powder Take 17 g by mouth once daily. Dissolve in juice. 510 g 0    prednisoLONE acetate (PRED FORTE) 1 % DrpS INSTILL ONE DROP INTO  LEFT EYE THREE TIMES A DAY  3    predniSONE (DELTASONE) 10 MG tablet TAKE 1 TABLET BY MOUTH EVERY DAY WITH BREAKFAST 90 tablet 0    predniSONE (DELTASONE) 10 MG tablet Take 2 tablets (20 mg total) by mouth once daily. Take with food 20 tablet 1    RENVELA 800 mg Tab TAKE 1 TABLET BY MOUTH THREE TIMES DAILY WITH MEALS 90 tablet 0    SENSIPAR 30 mg Tab daily with  breakfast.       tiZANidine (ZANAFLEX) 4 MG tablet 1 tablet by mouth every 8-12 hours as needed for muscle spasm 90 tablet 0    warfarin (COUMADIN) 5 MG tablet Take 1 tablet (5 mg total) by mouth Daily. Per [facility] (Patient taking differently: Take 5 mg by mouth every evening. Per [facility]) 90 tablet 3    warfarin (COUMADIN) 5 MG tablet TAKE 1 TABLET BY MOUTH EVERYDAY EXCEPT 1 AND 1/2 TABLETS ON MONDAY AND FRIDAY OR AS DIRECTED BY CLINIC 40 tablet 0     No facility-administered encounter medications on file as of 3/19/2018.      Orders Placed This Encounter   Procedures    X-Ray Chest PA And Lateral     Standing Status:   Future     Number of Occurrences:   1     Standing Expiration Date:   3/19/2019     Plan:     CXR (phone report).  Increase Prednisone dose to 20 mg daily x 7-10 days, then resume 10 mg daily.  Begin Mucinex 600-1200 mg twice daily until congestion improves.  Begin Doxycycline 100 mg twice daily x 10 days.  Continue O2 at 3-4 LPM.  Call/return if resp symptoms fail to improve.

## 2018-03-19 NOTE — PROGRESS NOTES
Subjective:      Patient ID: Sisi Medel is a 57 y.o. female.    Chief Complaint: No chief complaint on file.    HPI  57 year old female presents with chief complaint of left knee pain x 1 month. She fell getting out of her car. She couldn't WB initially. Pain has been intermittent since then. She did not seek medical attention until now. Pain is anterior. It is worse when she elevates it. She also has pain with walking. She took tylenol with no relief. She has h/o ESRD. She sometimes uses a rollator. She fractured the left proximal fibula in June 2017.   Review of Systems   Constitution: Negative for chills, fever and night sweats.   Cardiovascular: Negative for chest pain.   Respiratory: Negative for cough and shortness of breath.    Skin: Negative for color change.   Gastrointestinal: Negative for heartburn.   Genitourinary: Negative for dysuria.   Neurological: Negative for numbness and paresthesias.   Psychiatric/Behavioral: Negative for altered mental status.   Allergic/Immunologic: Negative for persistent infections.         Objective:            General    Vitals reviewed.  Constitutional: She is oriented to person, place, and time. She appears well-developed and well-nourished.   Cardiovascular: Normal rate.    Neurological: She is alert and oriented to person, place, and time.             Left Knee Exam:  ROM 0-125 degrees  +crepitus  No effusion  Moderate TTP medial joint line  Mild TTP proximal fibula      X-ray: ordered and reviewed by myself. There is a healing fracture of the proximal fibula.  There is mild DJD. No acute fx or dislocation.       Assessment:       Encounter Diagnosis   Name Primary?    Primary osteoarthritis of left knee Yes          Plan:       Discussed treatment options with patient. She would like a cortisone injection. RTC prn.     PROCEDURE:  I have explained the risks, benefits, and alternatives of the procedure in detail.  The patient voices understanding and all  questions have been answered.  The patient agrees to proceed as planned. So after I performed a sterile prep of the skin in the normal fashion the left knee is injected using a 22 gauge needle from the anterolateral approach with a combination of 4cc 1% plain lidocaine and 80 mg of depo medrol.  The patient is cautioned and immediate relief of pain is secondary to the local anesthetic and will be temporary.  After the anesthetic wears off there may be a increase in pain that may last for a few hours or a few days and they should use ice to help alleviate this flair up of pain.

## 2018-03-23 ENCOUNTER — ANTI-COAG VISIT (OUTPATIENT)
Dept: CARDIOLOGY | Facility: CLINIC | Age: 58
End: 2018-03-23
Payer: MEDICARE

## 2018-03-23 DIAGNOSIS — Z79.01 CURRENT USE OF LONG TERM ANTICOAGULATION: Primary | ICD-10-CM

## 2018-03-23 LAB — INR PPP: 1.9 (ref 2–3)

## 2018-03-23 PROCEDURE — 85610 PROTHROMBIN TIME: CPT | Mod: QW,S$GLB,, | Performed by: PHARMACIST

## 2018-03-23 NOTE — PROGRESS NOTES
Patient's INR is slightly low today. Patient states that she was diagnosed with bronchitis and was prescribed Doxycycline 100mg twice a day X 10 days and Prednisone 20mg X 10 days which was started on 3/20. Prednisone is not a new medication for this patient but this is an increase in dose. DDI expected with both medications therefore I am not increasing her weekly dose.Patient states that she has had a poor appetite. Will maintain current dose. Advised patient to call coumadin clinic with any changes or concerns.

## 2018-03-23 NOTE — PROGRESS NOTES
Patient has other labs scheduled on 3/26. Due to potential interacting meds, we will add on an INR to her current labs on 3/26. Pt seen by Yesica WARE . I have reviewed her initial findings and agree with her assessment and plan

## 2018-03-26 ENCOUNTER — ANTI-COAG VISIT (OUTPATIENT)
Dept: CARDIOLOGY | Facility: CLINIC | Age: 58
End: 2018-03-26

## 2018-03-26 ENCOUNTER — LAB VISIT (OUTPATIENT)
Dept: LAB | Facility: HOSPITAL | Age: 58
End: 2018-03-26
Attending: INTERNAL MEDICINE
Payer: MEDICARE

## 2018-03-26 ENCOUNTER — OFFICE VISIT (OUTPATIENT)
Dept: TRANSPLANT | Facility: CLINIC | Age: 58
End: 2018-03-26
Payer: MEDICARE

## 2018-03-26 VITALS
SYSTOLIC BLOOD PRESSURE: 135 MMHG | WEIGHT: 272.69 LBS | HEART RATE: 70 BPM | HEIGHT: 68 IN | DIASTOLIC BLOOD PRESSURE: 93 MMHG | BODY MASS INDEX: 41.33 KG/M2 | OXYGEN SATURATION: 94 %

## 2018-03-26 DIAGNOSIS — E66.01 MORBID OBESITY WITH BMI OF 40.0-44.9, ADULT: Chronic | ICD-10-CM

## 2018-03-26 DIAGNOSIS — I27.29 OTHER SECONDARY PULMONARY HYPERTENSION: ICD-10-CM

## 2018-03-26 DIAGNOSIS — J96.11 CHRONIC RESPIRATORY FAILURE WITH HYPOXIA: ICD-10-CM

## 2018-03-26 DIAGNOSIS — I27.20 PULMONARY HYPERTENSION: ICD-10-CM

## 2018-03-26 DIAGNOSIS — Z79.01 CURRENT USE OF LONG TERM ANTICOAGULATION: ICD-10-CM

## 2018-03-26 DIAGNOSIS — J98.4 CRLD (CHRONIC RESTRICTIVE LUNG DISEASE): ICD-10-CM

## 2018-03-26 DIAGNOSIS — I50.42 CHRONIC COMBINED SYSTOLIC AND DIASTOLIC HEART FAILURE: Primary | Chronic | ICD-10-CM

## 2018-03-26 DIAGNOSIS — E78.5 HYPERLIPIDEMIA, UNSPECIFIED HYPERLIPIDEMIA TYPE: Chronic | ICD-10-CM

## 2018-03-26 DIAGNOSIS — D86.0 PULMONARY SARCOIDOSIS: Chronic | ICD-10-CM

## 2018-03-26 LAB
ALBUMIN SERPL BCP-MCNC: 3.4 G/DL
ALP SERPL-CCNC: 200 U/L
ALT SERPL W/O P-5'-P-CCNC: 18 U/L
ANION GAP SERPL CALC-SCNC: 19 MMOL/L
AST SERPL-CCNC: 16 U/L
BASOPHILS # BLD AUTO: 0.02 K/UL
BASOPHILS NFR BLD: 0.2 %
BILIRUB SERPL-MCNC: 0.5 MG/DL
BNP SERPL-MCNC: 550 PG/ML
BUN SERPL-MCNC: 76 MG/DL
CALCIUM SERPL-MCNC: 9.3 MG/DL
CHLORIDE SERPL-SCNC: 90 MMOL/L
CO2 SERPL-SCNC: 23 MMOL/L
CREAT SERPL-MCNC: 8.9 MG/DL
DIFFERENTIAL METHOD: ABNORMAL
EOSINOPHIL # BLD AUTO: 0.1 K/UL
EOSINOPHIL NFR BLD: 0.6 %
ERYTHROCYTE [DISTWIDTH] IN BLOOD BY AUTOMATED COUNT: 17 %
EST. GFR  (AFRICAN AMERICAN): 5.2 ML/MIN/1.73 M^2
EST. GFR  (NON AFRICAN AMERICAN): 4.5 ML/MIN/1.73 M^2
GLUCOSE SERPL-MCNC: 157 MG/DL
HCT VFR BLD AUTO: 48 %
HGB BLD-MCNC: 14.7 G/DL
IMM GRANULOCYTES # BLD AUTO: 0.09 K/UL
IMM GRANULOCYTES NFR BLD AUTO: 0.7 %
INR PPP: 3.3
LYMPHOCYTES # BLD AUTO: 0.9 K/UL
LYMPHOCYTES NFR BLD: 7.1 %
MCH RBC QN AUTO: 28.8 PG
MCHC RBC AUTO-ENTMCNC: 30.6 G/DL
MCV RBC AUTO: 94 FL
MONOCYTES # BLD AUTO: 0.9 K/UL
MONOCYTES NFR BLD: 7.5 %
NEUTROPHILS # BLD AUTO: 10.6 K/UL
NEUTROPHILS NFR BLD: 83.9 %
NRBC BLD-RTO: 0 /100 WBC
PLATELET # BLD AUTO: 287 K/UL
PMV BLD AUTO: 10.8 FL
POTASSIUM SERPL-SCNC: 5.2 MMOL/L
PROT SERPL-MCNC: 8 G/DL
PROTHROMBIN TIME: 31.5 SEC
RBC # BLD AUTO: 5.1 M/UL
SODIUM SERPL-SCNC: 132 MMOL/L
WBC # BLD AUTO: 12.56 K/UL

## 2018-03-26 PROCEDURE — 99214 OFFICE O/P EST MOD 30 MIN: CPT | Mod: S$GLB,,, | Performed by: INTERNAL MEDICINE

## 2018-03-26 PROCEDURE — 85025 COMPLETE CBC W/AUTO DIFF WBC: CPT

## 2018-03-26 PROCEDURE — 83880 ASSAY OF NATRIURETIC PEPTIDE: CPT

## 2018-03-26 PROCEDURE — 80053 COMPREHEN METABOLIC PANEL: CPT

## 2018-03-26 PROCEDURE — 36415 COLL VENOUS BLD VENIPUNCTURE: CPT

## 2018-03-26 PROCEDURE — 99999 PR PBB SHADOW E&M-EST. PATIENT-LVL III: CPT | Mod: PBBFAC,,, | Performed by: INTERNAL MEDICINE

## 2018-03-26 PROCEDURE — 85610 PROTHROMBIN TIME: CPT

## 2018-03-26 RX ORDER — CLOPIDOGREL BISULFATE 75 MG/1
75 TABLET ORAL DAILY
Qty: 30 TABLET | Refills: 11 | Status: SHIPPED | OUTPATIENT
Start: 2018-03-26 | End: 2019-01-01 | Stop reason: SDUPTHER

## 2018-03-26 NOTE — PATIENT INSTRUCTIONS
Try to see if zyrtec helps with the cough and your breathing (maybe some of this is allergy related)  Call Dr Harvey office if you are still coughing this time next week    Keep salt intake to under 2000 mg sodium, fluids to under 32 oz (4 -  8-oz cups)    Call us if you find yourself getting more short of breath, have more swelling or unexpected weight changes

## 2018-03-26 NOTE — LETTER
March 26, 2018        Aaron Ware  [address]  [id number]  [facility] [address]  Phone: [phone]  Fax: [phone]             [facility]  [address]  [facility] [address]  Phone: [phone]   Patient: Sisi Medel   MR Number: [id number]   YOB: 1960   Date of Visit: 3/26/2018       Dear Dr. Aaron Ware    Thank you for referring Sisi Medel to me for evaluation. Attached you will find relevant portions of my assessment and plan of care.    If you have questions, please do not hesitate to call me. I look forward to following Sisi Medel along with you.    Sincerely,    Cara Chavarria MD    Enclosure    If you would like to receive this communication electronically, please contact [email or web address] or [phone] to request [facility] [employer] access.    [employer] is a tool which provides read-only access to select patient information with whom you have a relationship. Its easy to use and provides real time access to review your patients record including encounter summaries, notes, results, and demographic information.    If you feel you have received this communication in error or would no longer like to receive these types of communications, please e-mail [email or web address]

## 2018-03-26 NOTE — PROGRESS NOTES
Subjective:    Patient ID:  Sisi Medel is a 57 y.o. female who presents for follow-up of Congestive Heart Failure (6mo clinic visit...Pt c/o cough and SOB over the past month. Pt requesting refills on her Plavix and Zanaflex (both from other providers))      Congestive Heart Failure   Associated symptoms include coughing. Pertinent negatives include no abdominal pain, change in bowel habit, chest pain, chills, diaphoresis, fever or weakness.       This is a 56 yo AAF with sarcoidosis, chronic combined CHF which demonstrated successful remodeling and most recently EF 40-45% range, PH, PAF, ESRD on HD MWF, morbid obesity, YOANDY, HBP, chronic respiratory failure who returns for HF/PH f/u (was on adcirca but stopped due to symptomatic hypotension)    Since last visit, pt says she fell about a month ago and hurt her knee, still bothering her. Has had some chest congestion as well with a cough, says her chest hurts when she coughs- Saw Dr Harvey and told him she wasn't breathing well and coughing- he gave her an abx and had her take mucinex and increased her pred for a while, though she says that shes still not breathing like she should. Says she has not been holding on to much fluid- her wt has been good despite having the weekend off of HF (goes Tues TH Sat).   She is anuric.  Watching her salt. Sleeps on her usual 3 pillows, no PND.         Review of Systems   Constitution: Negative for chills, decreased appetite, diaphoresis, fever, weakness, malaise/fatigue, night sweats, weight gain and weight loss.   HENT: Negative.    Eyes: Negative.    Cardiovascular: Positive for dyspnea on exertion. Negative for chest pain, cyanosis, irregular heartbeat, leg swelling, near-syncope, orthopnea, palpitations, paroxysmal nocturnal dyspnea and syncope.   Respiratory: Positive for cough. Negative for hemoptysis, shortness of breath, sleep disturbances due to breathing, snoring, sputum production and wheezing.    Endocrine:  "Negative.    Hematologic/Lymphatic: Does not bruise/bleed easily.   Skin: Negative.    Musculoskeletal: Positive for falls and joint pain.   Gastrointestinal: Negative for bloating, abdominal pain, change in bowel habit and diarrhea.   Neurological: Negative for light-headedness and loss of balance.   Psychiatric/Behavioral: Negative for depression.     BP (!) 135/93   Pulse 70   Ht 5' 8" (1.727 m)   Wt 123.7 kg (272 lb 11.3 oz)   LMP  (LMP Unknown)   SpO2 (!) 94% Comment: Pt on 3L of O2 at reading  BMI 41.47 kg/m²      Physical Exam   Constitutional: She is oriented to person, place, and time. She appears well-developed and well-nourished.   HENT:   Head: Normocephalic and atraumatic.   Eyes: Conjunctivae and EOM are normal. Pupils are equal, round, and reactive to light.   Neck: Neck supple. JVD present. No tracheal deviation present. No thyromegaly present.   Cardiovascular: Normal rate, regular rhythm and normal heart sounds.  PMI is displaced.  Exam reveals no gallop and no friction rub.    No murmur heard.  Pulmonary/Chest: Effort normal. No respiratory distress. She has no wheezes. She has rales. She exhibits no tenderness.   Few scattered crackles   Abdominal: Soft. Bowel sounds are normal. She exhibits no distension and no mass. There is no tenderness. There is no rebound and no guarding.   Musculoskeletal: Normal range of motion. She exhibits edema. She exhibits no tenderness.   1+ pitting edema to knees   Lymphadenopathy:     She has no cervical adenopathy.   Neurological: She is alert and oriented to person, place, and time. She has normal reflexes. No cranial nerve deficit. She exhibits normal muscle tone. Coordination normal.   Skin: Skin is warm and dry.   Psychiatric: She has a normal mood and affect. Her behavior is normal. Thought content normal.   Nursing note and vitals reviewed.          Chemistry        Component Value Date/Time     (L) 03/26/2018 0842    K 5.2 (H) 03/26/2018 0842 "    CL 90 (L) 03/26/2018 0842    CO2 23 03/26/2018 0842    BUN 76 (H) 03/26/2018 0842    CREATININE 8.9 (H) 03/26/2018 0842     (H) 03/26/2018 0842        Component Value Date/Time    CALCIUM 9.3 03/26/2018 0842    ALKPHOS 200 (H) 03/26/2018 0842    AST 16 03/26/2018 0842    ALT 18 03/26/2018 0842    BILITOT 0.5 03/26/2018 0842            Magnesium   Date Value Ref Range Status   01/03/2018 2.4 1.6 - 2.6 mg/dL Final       Lab Results   Component Value Date    WBC 12.56 03/26/2018    HGB 14.7 03/26/2018    HCT 48.0 03/26/2018    MCV 94 03/26/2018     03/26/2018       Lab Results   Component Value Date    INR 3.3 (H) 03/26/2018    INR 1.9 03/23/2018    INR 2.1 03/02/2018       BNP   Date Value Ref Range Status   03/26/2018 550 (H) 0 - 99 pg/mL Final     Comment:     Values of less than 100 pg/ml are consistent with non-CHF populations.   01/02/2018 933 (H) 0 - 99 pg/mL Final     Comment:     Values of less than 100 pg/ml are consistent with non-CHF populations.   10/02/2017 883 (H) 0 - 99 pg/mL Final     Comment:     Values of less than 100 pg/ml are consistent with non-CHF populations.     Assessment:       1. Chronic combined systolic and diastolic CHF now on HD Remains FC III, BNP at lower end of usual range- has been more compliant with fluid/Na restrictions lately   2. Chronic hypoxic resp failure- on home O2, stable   3. Shortness of breath    4. Sarcoidosis- on systemic steroids   5. Pulmonary hypertension-   6. Paroxysmal atrial fibrillation- with plans for ablation    7. Morbid obesity    8. ESRD on HD MWF        Plan:   - pt to let Dr Harvey know if she's still coughing this time next week- could also try zyrtec to see if it helps  No changes today to her HF regimen  -Recommend 2 gram sodium restriction, 32 oz fluid restriction per nephrology  Encourage physical activity with graded exercise program.  Requested patient to weigh themselves daily, and to notify us if their weight increases by more  than 3 lbs in 1 day or 5 lbs in 1 week.    F/u 6 mo with labs

## 2018-04-02 ENCOUNTER — ANTI-COAG VISIT (OUTPATIENT)
Dept: CARDIOLOGY | Facility: CLINIC | Age: 58
End: 2018-04-02
Payer: MEDICARE

## 2018-04-02 ENCOUNTER — HOSPITAL ENCOUNTER (OUTPATIENT)
Dept: VASCULAR SURGERY | Facility: CLINIC | Age: 58
Discharge: HOME | End: 2018-04-02
Attending: STUDENT IN AN ORGANIZED HEALTH CARE EDUCATION/TRAINING PROGRAM
Payer: MEDICARE

## 2018-04-02 ENCOUNTER — OFFICE VISIT (OUTPATIENT)
Dept: DERMATOLOGY | Facility: CLINIC | Age: 58
End: 2018-04-02
Payer: MEDICARE

## 2018-04-02 ENCOUNTER — OFFICE VISIT (OUTPATIENT)
Dept: VASCULAR SURGERY | Facility: CLINIC | Age: 58
End: 2018-04-02
Attending: SURGERY
Payer: MEDICARE

## 2018-04-02 VITALS
SYSTOLIC BLOOD PRESSURE: 124 MMHG | BODY MASS INDEX: 43.25 KG/M2 | WEIGHT: 275.56 LBS | DIASTOLIC BLOOD PRESSURE: 83 MMHG | HEART RATE: 69 BPM | TEMPERATURE: 98 F | HEIGHT: 67 IN

## 2018-04-02 DIAGNOSIS — L98.491 CALLOUS ULCER, LIMITED TO BREAKDOWN OF SKIN: Primary | ICD-10-CM

## 2018-04-02 DIAGNOSIS — Z79.01 CURRENT USE OF LONG TERM ANTICOAGULATION: Primary | ICD-10-CM

## 2018-04-02 DIAGNOSIS — T82.590D AV GRAFT MALFUNCTION, SUBSEQUENT ENCOUNTER: Primary | ICD-10-CM

## 2018-04-02 DIAGNOSIS — N18.6 ESRD (END STAGE RENAL DISEASE) ON DIALYSIS: ICD-10-CM

## 2018-04-02 DIAGNOSIS — Z99.2 ESRD (END STAGE RENAL DISEASE) ON DIALYSIS: ICD-10-CM

## 2018-04-02 LAB — INR PPP: 2.7 (ref 2–3)

## 2018-04-02 PROCEDURE — 93990 DOPPLER FLOW TESTING: CPT | Mod: S$GLB,,, | Performed by: SURGERY

## 2018-04-02 PROCEDURE — 99212 OFFICE O/P EST SF 10 MIN: CPT | Mod: 25,S$GLB,, | Performed by: DERMATOLOGY

## 2018-04-02 PROCEDURE — 99214 OFFICE O/P EST MOD 30 MIN: CPT | Mod: S$GLB,,, | Performed by: SURGERY

## 2018-04-02 PROCEDURE — 99499 UNLISTED E&M SERVICE: CPT | Mod: S$GLB,,, | Performed by: SURGERY

## 2018-04-02 PROCEDURE — 99999 PR PBB SHADOW E&M-EST. PATIENT-LVL IV: CPT | Mod: PBBFAC,,, | Performed by: SURGERY

## 2018-04-02 PROCEDURE — 99999 PR PBB SHADOW E&M-EST. PATIENT-LVL II: CPT | Mod: PBBFAC,,, | Performed by: DERMATOLOGY

## 2018-04-02 PROCEDURE — 11900 INJECT SKIN LESIONS </W 7: CPT | Mod: S$GLB,,, | Performed by: DERMATOLOGY

## 2018-04-02 PROCEDURE — 85610 PROTHROMBIN TIME: CPT | Mod: QW,S$GLB,, | Performed by: PHARMACIST

## 2018-04-02 RX ORDER — BETAMETHASONE DIPROPIONATE 0.5 MG/G
OINTMENT TOPICAL
Qty: 45 G | Refills: 3 | Status: SHIPPED | OUTPATIENT
Start: 2018-04-02 | End: 2018-05-18 | Stop reason: SDUPTHER

## 2018-04-02 RX ORDER — DAPSONE 50 MG/G
GEL TOPICAL
Qty: 60 G | Refills: 2 | Status: SHIPPED | OUTPATIENT
Start: 2018-04-02 | End: 2019-01-01

## 2018-04-02 RX ORDER — GABAPENTIN 300 MG/1
CAPSULE ORAL
Refills: 1 | COMMUNITY
Start: 2018-01-23 | End: 2018-04-25 | Stop reason: SDUPTHER

## 2018-04-02 NOTE — PROGRESS NOTES
.Quick follow up for supra therapeutic INR. INR within therapeutic range today. Patient continues on Prednisone indefinitely but at a lower dose. She also states that she has completed her course of Doxycycline. Dose per calendar. Patient denies any bleeding, bruising or other changes that would affect warfarin therapy. Advised patient to call coumadin clinic with any changes or concerns.

## 2018-04-02 NOTE — PROGRESS NOTES
Subjective:       Patient ID:  Sisi Medel is a 57 y.o. female who presents for   Chief Complaint   Patient presents with    Rash     mild improvement      Pt with ESRD on HD      Rash  - Follow-up  Symptom course: improving (left side)  Currently using: last seen 2/19/18 for IL K 10. was not able to get clob oint bid.  Affected locations: right hand and left hand  Signs / symptoms: tender        Review of Systems   Skin: Positive for rash. Negative for itching.   Hematologic/Lymphatic: Bruises/bleeds easily (on plavix and coumadin and pred and asa).        Objective:    Physical Exam   Constitutional: She appears well-developed and well-nourished. She is obese.  She is on home oxygen.  No distress.   Neurological: She is alert and oriented to person, place, and time. She is not disoriented.   Psychiatric: She has a normal mood and affect.   Skin:   Areas Examined (abnormalities noted in diagram):   RUE Inspected  LUE Inspection Performed  Nails and Digits Inspection Performed             Diagram Legend     Erythematous scaling macule/papule c/w actinic keratosis       Vascular papule c/w angioma      Pigmented verrucoid papule/plaque c/w seborrheic keratosis      Yellow umbilicated papule c/w sebaceous hyperplasia      Irregularly shaped tan macule c/w lentigo     1-2 mm smooth white papules consistent with Milia      Movable subcutaneous cyst with punctum c/w epidermal inclusion cyst      Subcutaneous movable cyst c/w pilar cyst      Firm pink to brown papule c/w dermatofibroma      Pedunculated fleshy papule(s) c/w skin tag(s)      Evenly pigmented macule c/w junctional nevus     Mildly variegated pigmented, slightly irregular-bordered macule c/w mildly atypical nevus      Flesh colored to evenly pigmented papule c/w intradermal nevus       Pink pearly papule/plaque c/w basal cell carcinoma      Erythematous hyperkeratotic cursted plaque c/w SCC      Surgical scar with no sign of skin cancer  recurrence      Open and closed comedones      Inflammatory papules and pustules      Verrucoid papule consistent consistent with wart     Erythematous eczematous patches and plaques     Dystrophic onycholytic nail with subungual debris c/w onychomycosis     Umbilicated papule    Erythematous-base heme-crusted tan verrucoid plaque consistent with inflamed seborrheic keratosis     Erythematous Silvery Scaling Plaque c/w Psoriasis     See annotation      Assessment / Plan:        Callous ulcer, limited to breakdown of skin x 3 months  Intralesional Kenalog 10mg/cc (0.9 cc total) injected into 2 lesions on the bilateral hands today after obtaining verbal consent including risk of surrounding hypopigmentation. Patient tolerated procedure well.    Units: 1  NDC for Kenalog 10mg/cc:  6329-0400-01      -     betamethasone dipropionate (DIPROLENE) 0.05 % ointment; AAA bid hand under occlusion  Dispense: 45 g; Refill: 3    Other orders  -     ACZONE 5 % topical gel; Apply to face every morning  Dispense: 60 g; Refill: 2 per pt request             Follow-up in about 4 weeks (around 4/30/2018).

## 2018-04-02 NOTE — PROGRESS NOTES
Sisi Medel  04/02/2018    HPI:  Patient is a 55 y.o.  female with a h/o HTN, HLD, CHF (EF 20%), COPD on home O2, paroxysmal Afib (on Coumadin), seizure disorder, ESRD on HD TThS via LUE AVG (placed 2/3/16) who presents to clinic today for follow up. She was last seen in our clinic on 1/26/18. She underwent LUE fistulagram and PTA arterial inflow (7 x 40 Taylor). She has done well since her last visit. She dialyzes for 5 hours given difficulty keeping volume off. She states that she was told that approx 4 hours in her venous pressures are somewhat elevated. She had not had any HD runs terminated, however. She is feeling well with no complaints today. Ultrasound shows no significant areas of concern.    S/p   2/3/16: LUE AVG creation (Dr Villafana)  6/21/16: Percutaneous mechanical thrombectomy w Possis Angiojet AVX; 4fr OTW embolectomy of arterial inflow   PTA outflow stenosis with a 7x40 mm balloon  10/12/16: fistulogram (75% stenosis noted), left upper extremity AV graft PTA venous outflow with resolution of stenosis noted  2/2017 Declot and venous outflow PTA and stent with 8 x 50 VIABAHN  5/15/2017: PTA outflow stenosis (8x60 mustang); Stent outflow stenosis (8x50 Viabahn)  10/12/17: PTA LUE AV graft (brachial vein) x2: (7x60 [place]); (8x40 Taylor)   01/10/18: PTA outflow stensois (8x40 Taylor) and (9x40 Taylor)   2/2/18: PTA arterial inflow (7 x 40 Taylor)    No MI/stroke  Tobacco use: Former smoker     Past Medical History   Diagnosis Date    Anemia in ESRD (end-stage renal disease) 3/7/2016    Cervical radiculopathy 7/20/2015    Chronic combined systolic and diastolic heart failure 6/14/2013     EF 20% 2013, improved with Medical therapy, negative PET 2013    Chronic respiratory failure with hypoxia 04/22/2013     On home oxygen at 2-5 liters    Chronic rhinitis 10/2/2013    DDD (degenerative disc disease), lumbar 6/17/2015    ESRD on hemodialysis 2/23/2016    Essential  hypertension     Gout     Hyperlipidemia 3/7/2016    Lateral meniscal tear 2016    Lumbar stenosis 2015    Morbid obesity 8/15/2013    Paroxysmal atrial fibrillation 2014     Not on anticoagulation    Peripheral neuropathy 2013    Personal history of fall 2014    Personal history of gastric ulcer 3/19/2013    Sarcoidosis, lung 2009     Diagnosed in  with ocular manifestation, on 4L home O2 and PO steroids    Secondary pulmonary hypertension 2015    Seizure disorder 3/19/2013    Shingles 2013    Spondylarthrosis 2015     Past Surgical History   Procedure Laterality Date     section, classic      Abdominal surgery      Vascular surgery       Family History   Problem Relation Age of Onset    Kidney failure Mother     Hypertension Mother     Diabetes Mother     Coronary artery disease Father     Hypertension Father     Diabetes Father     Lupus Sister     Diabetes Maternal Grandmother     Asthma Neg Hx     Emphysema Neg Hx     Melanoma Neg Hx     Psoriasis Neg Hx      Social History     Social History    Marital status: Single     Spouse name: N/A    Number of children: N/A    Years of education: N/A     Occupational History    Not on file.     Social History Main Topics    Smoking status: Former Smoker     Packs/day: 0.50     Years: 10.00     Types: Cigarettes     Quit date: 1994    Smokeless tobacco: Never Used    Alcohol use No    Drug use: No    Sexual activity: No     Other Topics Concern    Not on file     Social History Narrative    Lives with boyfriend/partner who helps with her care.     Plans to travel to [place] for 2 weeks in 2016     Current Outpatient Prescriptions on File Prior to Visit   Medication Sig    ACZONE 5 % topical gel Apply topically once daily.     allopurinol (ZYLOPRIM) 100 MG tablet Take 1 tablet (100 mg total) by mouth once daily.    amiodarone (PACERONE) 200 MG Tab Take 1 tablet (200 mg  total) by mouth every morning.    ammonium lactate (LAC-HYDRIN) 12 % lotion Apply topically as needed (for scars on arms).     aspirin 81 MG Chew Take 81 mg by mouth every morning.    atorvastatin (LIPITOR) 80 MG tablet Take 80 mg by mouth once daily.     capsicum 0.075% (ZOSTRIX) 0.075 % topical cream Apply topically 3 (three) times daily. For neuropathic pain of feet    gabapentin (NEURONTIN) 100 MG capsule TAKE ONE CAPSULE BY MOUTH THREE TIMES DAILY (Patient taking differently: TAKE ONE CAPSULE BY MOUTH THREE TIMES DAILY-noon, evening, bedtime)    levetiracetam (KEPPRA) 500 MG Tab TAKE 1 TABLET BY MOUTH TWICE DAILY.    melatonin 5 mg Tab Take 1 tablet by mouth nightly.    midodrine (PROAMATINE) 10 MG tablet     midodrine (PROAMATINE) 5 MG Tab     NEPHRO-LINDA 0.8 mg Tab TK 1 T PO  QD    ondansetron (ZOFRAN-ODT) 8 MG TbDL Take 1 tablet (8 mg total) by mouth every 8 (eight) hours as needed.    oxycodone-acetaminophen (PERCOCET) 7.5-325 mg per tablet Take 1 tablet by mouth every 4 (four) hours as needed.    predniSONE (DELTASONE) 10 MG tablet TAKE 3 TABLETS BY MOUTH FOR 7 DAYS, THEN 2 TABLETS FOR 7 DAYS, THEN 1 TABLET EVERY DAY AFTER.    RENVELA 800 mg Tab TAKE 1 TABLET BY MOUTH THREE TIMES DAILY WITH MEALS    tizanidine 4 mg Cap Take 4 mg by mouth 2 (two) times daily as needed. (Patient taking differently: Take 4 mg by mouth daily as needed (for muscle spasms.). )    warfarin (COUMADIN) 5 MG tablet TAKE 1 TABLET(5 MG) BY MOUTH DAILY     No current facility-administered medications on file prior to visit.        REVIEW OF SYSTEMS:  General: negative; ENT: negative; Allergy and Immunology: negative; Hematological and Lymphatic: Negative; Endocrine: negative; Respiratory: no cough, shortness of breath, or wheezing; Cardiovascular: no chest pain or dyspnea on exertion; Gastrointestinal: no abdominal pain/back, change in bowel habits, or bloody stools; Genito-Urinary: no dysuria, trouble voiding, or  hematuria; Musculoskeletal: no pain, numbness, to LUE  Neurological: no TIA or stroke symptoms; Psychiatric: no nervousness, anxiety or depression.    PHYSICAL EXAM:   Vitals:    07/14/16 1532   BP: (!) 81/57   Pulse: (!) 52   Temp: 98.9 °F (37.2 °C)       General appearance:  Alert, well-appearing, and in no distress.  Oriented to person, place, and time   Neurological:  Normal speech, no focal findings noted; CN II - XII grossly intact           Musculoskeletal: Digits/nail without cyanosis/clubbing.  Normal muscle strength/tone.                 Neck: Supple, no significant adenopathy; thyroid is not enlarged                Chest:  Clear to auscultation, symmetric air entry      No use of accessory muscles             Cardiac: Normal rate and regular rhythm, S1 and S2 normal; PMI non-displaced          Abdomen: Soft, non-tender, non-distended, no masses or organomegaly      Extremities:   LUE AVG with palpable thrill, no increased pulsatility, 2+ distal radial pulse      LAB RESULTS:  Lab Results   Component Value Date    K 4.5 07/12/2016    K 4.6 07/04/2016    K 5.1 07/03/2016    CREATININE 6.5 (H) 07/12/2016    CREATININE 10.2 (H) 07/04/2016    CREATININE 7.6 (H) 07/03/2016     Lab Results   Component Value Date    WBC 11.09 07/12/2016    WBC 10.38 07/04/2016    WBC 11.01 07/03/2016    HCT 37.5 07/12/2016    HCT 31.4 (L) 07/04/2016    HCT 30.1 (L) 07/03/2016     07/12/2016     07/04/2016     07/03/2016     Lab Results   Component Value Date    HGBA1C 5.7 05/17/2016    HGBA1C 6.7 (H) 10/15/2015    HGBA1C 5.5 06/15/2015       IMAGING:  VAS US HD ACCESS (4/2/18):  Inflow-156  arterial anastomosis-343  proximal graft-236  mid graft-248  distal graft-204  venous anastomosis-178  venous outflow(stent)-91  venous outflow(distal stent)- 191                   volume flow: 1804 ml./min.     Overall Impression:  Color flow duplex exam reveals a patent left hemodialysis AV graft and outflow stents. Mild  velocity elevations are noted within the most distal stent. Volume flow at mid graft level measures 1804 ml./min.    01/26/18 Hemodialysis US:  arterial anastomosis-387  proximal graft-177  mid graft-248  distal graft-169  venous anastomosis-177  venous outflow(stent)-155  venous confluence- 191                   volume flow: 1467 ml./min.   Velocity elevation of 636 cm/s is noted in the proximal graft with a visual narrowing - suggestive of a focal stenosis.       IMP/PLAN:  55 y.o. female with HTN, HLD, CHF (EF 20%), COPD on home O2, paroxysmal Afib (on Coumadin), seizure disorder, ESRD on HD TThS via LUE AVG. Doing well today with no concerning findings on ultrasound.    - Ultrasound reviewed - no evidence of hemodynamically significant stenosis  - Return to clinic in 3 months with graft duplex  - continue statin      Torrey Villafana MD  Vascular & Endovascular Surgery

## 2018-04-03 ENCOUNTER — OFFICE VISIT (OUTPATIENT)
Dept: PODIATRY | Facility: CLINIC | Age: 58
End: 2018-04-03
Payer: MEDICARE

## 2018-04-03 VITALS
SYSTOLIC BLOOD PRESSURE: 98 MMHG | DIASTOLIC BLOOD PRESSURE: 68 MMHG | HEART RATE: 71 BPM | BODY MASS INDEX: 43.16 KG/M2 | WEIGHT: 275 LBS | HEIGHT: 67 IN

## 2018-04-03 DIAGNOSIS — I73.9 PVD (PERIPHERAL VASCULAR DISEASE): Primary | ICD-10-CM

## 2018-04-03 DIAGNOSIS — Z87.2 HEALED ULCER OF RIGHT FOOT ON EXAMINATION: ICD-10-CM

## 2018-04-03 DIAGNOSIS — Z87.2 HEALED ULCER OF LEFT FOOT ON EXAMINATION: ICD-10-CM

## 2018-04-03 DIAGNOSIS — R60.0 BILATERAL LOWER EXTREMITY EDEMA: ICD-10-CM

## 2018-04-03 PROCEDURE — 99213 OFFICE O/P EST LOW 20 MIN: CPT | Mod: S$GLB,,, | Performed by: PODIATRIST

## 2018-04-03 PROCEDURE — 99999 PR PBB SHADOW E&M-EST. PATIENT-LVL II: CPT | Mod: PBBFAC,,, | Performed by: PODIATRIST

## 2018-04-03 NOTE — PROGRESS NOTES
Subjective:      Patient ID: Sisi Medel is a 57 y.o. female.    Chief Complaint: PCP (Ivester 01/26/208) and Nail Care    Sisi is a 57 y.o. female who presents to the clinic for evaluation and treatment of high risk feet. Sisi has a past medical history of Anemia in ESRD (end-stage renal disease) (3/7/2016); Anticoagulant long-term use; Cervical radiculopathy (7/20/2015); CHF (congestive heart failure); Chronic combined systolic and diastolic heart failure (6/14/2013); Chronic respiratory failure with hypoxia (04/22/2013); Closed fracture of proximal end of right fibula (6/27/2016); Complications due to renal dialysis device, implant, and graft; DDD (degenerative disc disease), lumbar (6/17/2015); Dependence on renal dialysis; Diabetes mellitus type II, controlled (12/8/2017); ESRD on hemodialysis (2/23/2016); Essential hypertension; Gout; Lateral meniscal tear (5/31/2016); Lumbar stenosis (6/17/2015); Pacemaker; Paroxysmal atrial fibrillation (5/16/2014); Peripheral neuropathy (11/13/2013); Personal history of gastric ulcer (3/19/2013); Sarcoidosis, lung (2009); Secondary pulmonary hypertension (4/7/2015); Seizure disorder (3/19/2013); Seizures; Shingles (11/13/2013); and Thyroid disease. The patient's chief complaint is follow up of multiple previous wounds on the right foot/leg which healed at last visit. She has once again been trimming her nails herself and causing wounds to the tips of both great toes. She also relates new wounds on top of both of her great toe joints which she is not sure how these started. They are dry and not bleeding and do not hurt her. Has been applying vicks on everything. No other pedal complaints.   This patient has documented high risk feet requiring routine maintenance secondary to peripheral neuropathy.    04/03/2018: presents to clinci for reassessment of b/l great toe wounds. States she has been applying Iodosorb daily and keeping covered with bandaids. Patient  relates that the wounds are doing much better and that they are healed. No pain. No other complaints. Doing well otherwise.     PCP: Carlos A Caputo MD    Date Last Seen by PCP:   Chief Complaint   Patient presents with    PCP     Patito 01/26/208    Nail Care         Current shoe gear:  Affected Foot: Football, UNNA boot, darco     Unaffected Foot: Slip-on shoes    Last encounter in this department: Visit date not found    Hemoglobin A1C   Date Value Ref Range Status   01/02/2018 6.6 (H) 4.0 - 5.6 % Final     Comment:     According to ADA guidelines, hemoglobin A1c <7.0% represents  optimal control in non-pregnant diabetic patients. Different  metrics may apply to specific patient populations.   Standards of Medical Care in Diabetes-2016.  For the purpose of screening for the presence of diabetes:  <5.7%     Consistent with the absence of diabetes  5.7-6.4%  Consistent with increasing risk for diabetes   (prediabetes)  >or=6.5%  Consistent with diabetes  Currently, no consensus exists for use of hemoglobin A1c  for diagnosis of diabetes for children.  This Hemoglobin A1c assay has significant interference with fetal   hemoglobin   (HbF). The results are invalid for patients with abnormal amounts of   HbF,   including those with known Hereditary Persistence   of Fetal Hemoglobin. Heterozygous hemoglobin variants (HbAS, HbAC,   HbAD, HbAE, HbA2) do not significantly interfere with this assay;   however, presence of multiple variants in a sample may impact the %   interference.     12/07/2017 6.5 (H) 4.0 - 5.6 % Final     Comment:     According to ADA guidelines, hemoglobin A1c <7.0% represents  optimal control in non-pregnant diabetic patients. Different  metrics may apply to specific patient populations.   Standards of Medical Care in Diabetes-2016.  For the purpose of screening for the presence of diabetes:  <5.7%     Consistent with the absence of diabetes  5.7-6.4%  Consistent with increasing risk for  diabetes   (prediabetes)  >or=6.5%  Consistent with diabetes  Currently, no consensus exists for use of hemoglobin A1c  for diagnosis of diabetes for children.  This Hemoglobin A1c assay has significant interference with fetal   hemoglobin   (HbF). The results are invalid for patients with abnormal amounts of   HbF,   including those with known Hereditary Persistence   of Fetal Hemoglobin. Heterozygous hemoglobin variants (HbAS, HbAC,   HbAD, HbAE, HbA2) do not significantly interfere with this assay;   however, presence of multiple variants in a sample may impact the %   interference.     09/06/2017 6.8 (H) 4.0 - 5.6 % Final     Comment:     According to ADA guidelines, hemoglobin A1c <7.0% represents  optimal control in non-pregnant diabetic patients. Different  metrics may apply to specific patient populations.   Standards of Medical Care in Diabetes-2016.  For the purpose of screening for the presence of diabetes:  <5.7%     Consistent with the absence of diabetes  5.7-6.4%  Consistent with increasing risk for diabetes   (prediabetes)  >or=6.5%  Consistent with diabetes  Currently, no consensus exists for use of hemoglobin A1c  for diagnosis of diabetes for children.  This Hemoglobin A1c assay has significant interference with fetal   hemoglobin   (HbF). The results are invalid for patients with abnormal amounts of   HbF,   including those with known Hereditary Persistence   of Fetal Hemoglobin. Heterozygous hemoglobin variants (HbAS, HbAC,   HbAD, HbAE, HbA2) do not significantly interfere with this assay;   however, presence of multiple variants in a sample may impact the %   interference.       Past Medical History:   Diagnosis Date    Anemia in ESRD (end-stage renal disease) 3/7/2016    Anticoagulant long-term use     Cervical radiculopathy 7/20/2015    CHF (congestive heart failure)     Chronic combined systolic and diastolic heart failure 6/14/2013    EF 20% 2013, improved with Medical therapy,  negative PET     Chronic respiratory failure with hypoxia 2013    On home oxygen at 2-5 liters    Closed fracture of proximal end of right fibula 2016    Complications due to renal dialysis device, implant, and graft     DDD (degenerative disc disease), lumbar 2015    Dependence on renal dialysis     Diabetes mellitus type II, controlled 2017    ESRD on hemodialysis 2016    Essential hypertension     Gout     Lateral meniscal tear 2016    Lumbar stenosis 2015    Pacemaker     Paroxysmal atrial fibrillation 2014    Not on anticoagulation    Peripheral neuropathy 2013    Personal history of gastric ulcer 3/19/2013    Sarcoidosis, lung 2009    Diagnosed in  with ocular manifestation, on 4L home O2 and PO steroids    Secondary pulmonary hypertension 2015    Seizure disorder 3/19/2013    Seizures     Shingles 2013    Thyroid disease        Past Surgical History:   Procedure Laterality Date    ABDOMINAL SURGERY      CARDIAC PACEMAKER PLACEMENT       SECTION      x2    OTHER SURGICAL HISTORY      loop recorder implant    stent to fistula      VASCULAR SURGERY      fistula to L upper arm        Family History   Problem Relation Age of Onset    Kidney failure Mother     Hypertension Mother     Diabetes Mother     Coronary artery disease Father     Hypertension Father     Diabetes Father     Lupus Sister     Diabetes Maternal Grandmother     Colon cancer Neg Hx     Ovarian cancer Neg Hx     Breast cancer Neg Hx        Social History     Social History    Marital status: Single     Spouse name: N/A    Number of children: N/A    Years of education: N/A     Social History Main Topics    Smoking status: Former Smoker     Packs/day: 0.50     Years: 10.00     Types: Cigarettes     Quit date: 1994    Smokeless tobacco: Never Used    Alcohol use No      Comment: rarely    Drug use: No    Sexual activity: Not  Currently     Other Topics Concern    Are You Pregnant Or Think You May Be? No    Breast-Feeding No     Social History Narrative    Lives with boyfriend/partner who helps with her care.            Current Outpatient Prescriptions   Medication Sig Dispense Refill    ACZONE 5 % topical gel Apply to face every morning 60 g 2    atorvastatin (LIPITOR) 80 MG tablet TAKE 1 TABLET(80 MG) BY MOUTH EVERY NIGHT 90 tablet 3    betamethasone dipropionate (DIPROLENE) 0.05 % ointment AAA bid hand under occlusion 45 g 3    blood sugar diagnostic Strp 1 strip by Misc.(Non-Drug; Combo Route) route 2 (two) times daily with meals. 100 strip 3    blood-glucose meter kit Use as instructed 1 each 0    cholecalciferol, vitamin D3, 1,000 unit capsule Take 1 capsule (1,000 Units total) by mouth once daily. (Patient taking differently: Take 1,000 Units by mouth every evening. )  0    clobetasol 0.05% (TEMOVATE) 0.05 % Oint AAA hand bid - under occlusion qhs 60 g 3    clopidogrel (PLAVIX) 75 mg tablet Take 1 tablet (75 mg total) by mouth once daily. 30 tablet 11    diclofenac sodium (SOLARAZE) 3 % gel       fludrocortisone (FLORINEF) 0.1 mg Tab Take 100 mcg by mouth 2 (two) times daily.      gabapentin (NEURONTIN) 300 MG capsule TK 1 C PO TID  1    ketorolac 0.5% (ACULAR) 0.5 % Drop instill one drop into both eyes every morning  3    lancets Misc 1 lancet by Misc.(Non-Drug; Combo Route) route 2 (two) times daily with meals. 100 each 3    lancing device Misc 1 Device by Misc.(Non-Drug; Combo Route) route 2 (two) times daily with meals. 1 each 0    levETIRAcetam (KEPPRA) 500 MG Tab Take 1 tablet (500 mg total) by mouth 2 (two) times daily. 180 tablet 3    nortriptyline (PAMELOR) 25 MG capsule Take 1 capsule (25 mg total) by mouth every evening. 30 capsule 11    omeprazole (PRILOSEC) 20 MG capsule TAKE 1 CAPSULE BY MOUTH ONCE DAILY 30 capsule 4    polyethylene glycol (GLYCOLAX) 17 gram/dose powder Take 17 g by mouth once  daily. Dissolve in juice. 510 g 0    prednisoLONE acetate (PRED FORTE) 1 % DrpS INSTILL ONE DROP INTO  LEFT EYE THREE TIMES A DAY  3    RENVELA 800 mg Tab TAKE 1 TABLET BY MOUTH THREE TIMES DAILY WITH MEALS 90 tablet 0    SENSIPAR 30 mg Tab daily with breakfast.       tiZANidine (ZANAFLEX) 4 MG tablet 1 tablet by mouth every 8-12 hours as needed for muscle spasm 90 tablet 0    warfarin (COUMADIN) 5 MG tablet Take 1 tablet (5 mg total) by mouth Daily. Per [facility] (Patient taking differently: Take 5 mg by mouth every evening. Per [facility]) 90 tablet 3    warfarin (COUMADIN) 5 MG tablet TAKE 1 TABLET BY MOUTH EVERYDAY EXCEPT 1 AND 1/2 TABLETS ON MONDAY AND FRIDAY OR AS DIRECTED BY CLINIC 40 tablet 0    midodrine (PROAMATINE) 5 MG Tab Take 2 tablets (10 mg total) by mouth every Mon, Wed, Fri, Sun. 32 tablet 11     No current facility-administered medications for this visit.        Review of patient's allergies indicates:   Allergen Reactions    Bactrim [sulfamethoxazole-trimethoprim] Other (See Comments)     Causes renal failure    Nsaids (non-steroidal anti-inflammatory drug) Other (See Comments)     Renal failure       Review of Systems   Constitution: Negative for chills and fever.   Cardiovascular: Positive for leg swelling. Negative for chest pain and claudication.   Respiratory: Negative for cough and shortness of breath.    Skin: Positive for dry skin, nail changes, poor wound healing and suspicious lesions.   Musculoskeletal:        Ankle pain left   Gastrointestinal: Negative for nausea and vomiting.   Neurological: Positive for numbness and paresthesias.   Psychiatric/Behavioral: Negative for altered mental status.           Objective:      Physical Exam   Constitutional: She is oriented to person, place, and time. She appears well-developed and well-nourished.   HENT:   Head: Normocephalic.   Cardiovascular: Intact distal pulses.    Pedal pulses diminished b/l. Toes cool to touch b/l.  2+ edema noted to b/l LE. No vericosities noted to b/l LEs.      Pulmonary/Chest: No respiratory distress.   Musculoskeletal:   Gastrocnemius equinus noted to b/l ankles with decreased DF noted on exam. MMT 5/5 in DF/PF/Inv/Ev resistance with no reproduction of pain in any direction. Passive range of motion of ankle and pedal joints is painless. Adequate pedal joint ROM.   No focal area of tenderness. No pain with DF resistance left ankle. Mild pain with palpation of medial and lateral collateral ankle ligaments left ankle. Negative anterior drawer sign left ankle. No ecchymosis noted to left ankle circumferentially.    Feet:   Right Foot:   Protective Sensation: 10 sites tested. 0 sites sensed.   Skin Integrity: Positive for ulcer, skin breakdown and dry skin.   Left Foot:   Protective Sensation: 10 sites tested. 0 sites sensed.   Skin Integrity: Positive for dry skin. Negative for ulcer or skin breakdown.   Neurological: She is alert and oriented to person, place, and time. She has normal strength. A sensory deficit is present.   Light touch, proprioception, and sharp/dull sensation are all intact bilaterally. Protective threshold with the Nichols-Wienstein monofilament is completely diminished bilaterally.      Skin: Skin is warm, dry and intact. Lesion noted. No erythema.   Generalized hyperpigmentation to ankle and lower 1/3 of R leg consistent with hemosiderin deposits. Mild dermatitis R lower 1/3 leg. Overall skin is thin, shiny, atrophic to b/l LEs. Loss of pedal hair b/l. Toes are cool to touch b/l.     Healed wound to dorsal aspects of both great toe IPJs. Fragile epithelium however no further drainage or erythema. Toes cool to touch.     Diffuse xerosis noted to plantar aspects of b/l foot/heels.      Psychiatric: She has a normal mood and affect. Her behavior is normal. Judgment and thought content normal.   Vitals reviewed.            Assessment:       Encounter Diagnoses   Name Primary?    PVD  (peripheral vascular disease) Yes    Bilateral lower extremity edema     Healed ulcer of left foot on examination     Healed ulcer of right foot on examination          Plan:       Sisi was seen today for pcp and nail care.    Diagnoses and all orders for this visit:    PVD (peripheral vascular disease)    Bilateral lower extremity edema    Healed ulcer of left foot on examination    Healed ulcer of right foot on examination      I counseled the patient on her conditions, their implications and medical management.    - Shoe inspection. General Foot Education. Patient reminded of the importance of good nutrition. Patient instructed on proper foot hygeine. We discussed wearing proper shoe gear, daily foot inspections, never walking without protective shoe gear, caution putting sharp instruments to feet     - Discussed general foot care:  Wear comfortable, proper fitting shoes. Wash feet daily. Dry well. After drying, apply moisturizer to feet (no lotion to webspaces). Inspect feet daily for skin breaks, blisters, swelling, or redness. Wear cotton socks (preferably white)  Change socks every day. Do NOT walk barefoot. Do NOT use heating pads or warm/hot water soaks     B/l hallux wounds healed. Keep areas covered with bandaid. Ok to let air dry. Ok to use moisturizer, avoid between toes.     I warned patient to watch for wound recurrence as well as signs and symptoms of infection including redness, drainage, purulence, odor, streaking, fever, chills, etc and I advised her to seek medical attention (ER or urgent car) if these symptoms arise.     RTC 4-6 weeks for routine foot assessment and trimming of nails.

## 2018-04-11 ENCOUNTER — CLINICAL SUPPORT (OUTPATIENT)
Dept: DIABETES | Facility: CLINIC | Age: 58
End: 2018-04-11
Payer: MEDICARE

## 2018-04-11 DIAGNOSIS — E11.8 CONTROLLED TYPE 2 DIABETES MELLITUS WITH COMPLICATION, WITHOUT LONG-TERM CURRENT USE OF INSULIN: ICD-10-CM

## 2018-04-11 PROCEDURE — G0108 DIAB MANAGE TRN  PER INDIV: HCPCS | Mod: S$GLB,,, | Performed by: DIETITIAN, REGISTERED

## 2018-04-11 NOTE — PROGRESS NOTES
Diabetes Education  Author: Zarina Watson RD  Date: 4/11/2018    Diabetes Education Visit  Diabetes Education Record Assessment/Progress: Comprehensive/Group (2 mo f/u)    Diabetes Type  Diabetes Type : Type II    Diabetes History  Diabetes Diagnosis: 0-1 year    Nutrition  Meal Planning: skipping meals (cut back on eating out/cooking more at home; appetite labile - sometimes only 1 or 2 meals daily)  What type of beverages do you drink?: diet soda/tea, water (crystal light; has <1L (32 oz) FR - usually goes over; has stopped drinking all SSB)  Meal Plan 24 Hour Recall - Breakfast:  (sometimes skips)  Meal Plan 24 Hour Recall - Lunch:  (usually skips)  Meal Plan 24 Hour Recall - Dinner:  (meat, greens, rice)  Meal Plan 24 Hour Recall - Snack:  (half a sandwich (definitely on dialysis days); still 2 pickles per day)    Monitoring   Monitoring: Freestyle Freedom Lite  Self Monitoring :  (hasn't checked - didn't know ho to use (checked in clinic FBG today 106 mg/dL))  Blood Glucose Logs: No  In the last month, how often have you had a low blood sugar reaction?: never  Can you tell when your blood sugar is too high?: yes    Exercise   Exercise Type: use exercise equipment (goes to gym)  Frequency:  (2-3 days per week)  Duration: 30 min    Current Diabetes Treatment   Current Treatment:  (no meds)    Social History  Preferred Learning Method: Face to Face  Primary Support: Self  Smoking Status: Ex Smoker  Alcohol Use: Monthly    Barriers to Change  Barriers to Change: None (motivation and multiple comorbidities) - improving  Learning Challenges : None      Diabetes Education Assessment/Progress  Diabetes Disease Process (diabetes disease process and treatment options): Discussion, Individual Session (Reviewed disease process; reviewed effects of daily steroid use on BG's. Also discussed effects of dialysis and CHF in congruence with new diabetes dx.)    Nutrition (Incorporating nutritional management into one's  lifestyle): Discussion, Needs Instruction, Needs Review, Individual Session, Written Materials Provided, Instructed (To my surprise has made a few dietary changes since last visit - seems much more motivated than originally expected. She has completely eliminated all SSB. Has tried to cut back on carbs/sugars. Still eating a lot of pickles and other high sodium foods. Typically only eating one meal daily - encouraged more frequent, smaller meals, especially on dialysis days. Reviewed pt's current FR and discussed ways to help limit fluid intake.)    Physical Activity (incorporating physical activity into one's lifestyle): Discussion, Individual Session (She has started going to the gym - went twice last week, plans to go 3 times this week; exercises there as tolerated for about an hour. Encouraged continuance and reviewed effects of PA on BG control.)    Medications (states correct name, dose, onset, peak, duration, side effects & timing of meds): Discussion, Individual Session (Remains off DM meds at this time.)    Monitoring (monitoring blood glucose/other parameters & using results): Discussion, Individual Session, Comprehends Key Points, Instructed, Demonstration, Return Demonstration, Written Materials Provided (She received a Freestyle lite meter from insurnace company, but did not know how to use it. Instructed on freestyle meter usage today. Reviewed goal BG's.)    Acute Complications (preventing, detecting, and treating acute complications): Individual Session, Discussion (Not currently on DM meds, no true risk of hypo at this time.)    Chronic Complications (preventing, detecting, and treating chronic complications): Discussion, Individual Session, Instructed, Comprehends Key Points (Reviewed list of possible future complications, as well as worsening of ESRD and CHF with uncontrolled diabetes.)    Cognitive (knowledge of self-management skills, functional health literacy): Discussion, Individual Session  (Pt has capability of attaining knowledge of DM lifestyle management - has made motivational strides since last visit. She displays knowledge of self-management skills and continues to work towards goals.)      Goals  Patient has selected/evaluated goals during today's session: Yes, evaluated    Healthy Eating: In Progress (Eliminate SSB and all pickles from diet - has eliminated SSB, but not pickles)  Start Date: 02/07/18         Diabetes Care Plan/Intervention  Education Plan/Intervention: Individual Follow-Up DSMT (2 mo f/u scheduled)      Diabetes Meal Plan  Restrictions: Restricted Carbohydrate, Low Sodium, Low Potassium, Fluid Restriction      Education Units of Time   Time Spent: 45 min      Health Maintenance was reviewed today with patient. Discussed with patient importance of routine eye exams, foot exams/foot care, blood work (i.e.: A1c, microalbumin, and lipid), dental visits, yearly flu vaccine, and pneumonia vaccine as indicated by PCP. Patient verbalized understanding.     Health Maintenance Topics with due status: Not Due       Topic Last Completion Date    Colonoscopy 10/01/2011    Pneumococcal PPSV23 (Medium Risk) 06/14/2016    Pap Smear with HPV Cotest 08/16/2016    DEXA SCAN 04/07/2017    Mammogram 08/25/2017    Lipid Panel 12/08/2017    Hemoglobin A1c 01/02/2018    Foot Exam 04/03/2018     Health Maintenance Due   Topic Date Due    TETANUS VACCINE  09/11/1979    Eye Exam  11/27/2016    Influenza Vaccine  08/01/2017

## 2018-04-18 ENCOUNTER — ANTI-COAG VISIT (OUTPATIENT)
Dept: CARDIOLOGY | Facility: CLINIC | Age: 58
End: 2018-04-18
Payer: MEDICARE

## 2018-04-18 DIAGNOSIS — Z79.01 CURRENT USE OF LONG TERM ANTICOAGULATION: Primary | ICD-10-CM

## 2018-04-18 LAB — INR PPP: 2 (ref 2–3)

## 2018-04-18 PROCEDURE — 85610 PROTHROMBIN TIME: CPT | Mod: QW,S$GLB,, | Performed by: INTERNAL MEDICINE

## 2018-04-18 NOTE — PROGRESS NOTES
Patient denies any changes in diet, medications, or health that would effect warfarin therapy.\  Patient was re-educated on situations that would require placing a call to the coumadin clinic, including bleeding or unusual bruising issues, changes in health, diet or medications, upcoming procedures that require warfarin interruption, and missed coumadin dose(s). Patient expressed understanding that avoidance of consistency with these parameters could cause fluctuations in INR, leading to more frequent visits and increase risk of adverse events.

## 2018-04-25 DIAGNOSIS — M62.838 MUSCLE SPASM: ICD-10-CM

## 2018-04-25 RX ORDER — GABAPENTIN 300 MG/1
CAPSULE ORAL
Qty: 270 CAPSULE | Refills: 0 | Status: SHIPPED | OUTPATIENT
Start: 2018-04-25 | End: 2018-05-07

## 2018-04-26 NOTE — TELEPHONE ENCOUNTER
90-day supply sent in last month - please check with patient to see how often she is using this. Would not recommend using this more than once a day

## 2018-04-27 ENCOUNTER — OFFICE VISIT (OUTPATIENT)
Dept: INTERNAL MEDICINE | Facility: CLINIC | Age: 58
End: 2018-04-27
Payer: MEDICARE

## 2018-04-27 VITALS
SYSTOLIC BLOOD PRESSURE: 120 MMHG | TEMPERATURE: 98 F | DIASTOLIC BLOOD PRESSURE: 76 MMHG | OXYGEN SATURATION: 95 % | HEART RATE: 86 BPM | HEIGHT: 67 IN | BODY MASS INDEX: 42.56 KG/M2 | WEIGHT: 271.19 LBS

## 2018-04-27 DIAGNOSIS — M17.12 ARTHRITIS OF LEFT KNEE: ICD-10-CM

## 2018-04-27 DIAGNOSIS — G40.909 SEIZURE DISORDER: Chronic | ICD-10-CM

## 2018-04-27 DIAGNOSIS — G63 POLYNEUROPATHY ASSOCIATED WITH UNDERLYING DISEASE: ICD-10-CM

## 2018-04-27 DIAGNOSIS — M79.641 RIGHT HAND PAIN: Primary | ICD-10-CM

## 2018-04-27 DIAGNOSIS — Z74.09 IMPAIRED FUNCTIONAL MOBILITY, BALANCE, GAIT, AND ENDURANCE: ICD-10-CM

## 2018-04-27 DIAGNOSIS — Z79.01 CURRENT USE OF LONG TERM ANTICOAGULATION: ICD-10-CM

## 2018-04-27 DIAGNOSIS — J96.11 CHRONIC RESPIRATORY FAILURE WITH HYPOXIA: ICD-10-CM

## 2018-04-27 DIAGNOSIS — E55.9 VITAMIN D INSUFFICIENCY: ICD-10-CM

## 2018-04-27 DIAGNOSIS — J98.4 CRLD (CHRONIC RESTRICTIVE LUNG DISEASE): ICD-10-CM

## 2018-04-27 DIAGNOSIS — D86.0 PULMONARY SARCOIDOSIS: Chronic | ICD-10-CM

## 2018-04-27 DIAGNOSIS — I48.21 PERMANENT ATRIAL FIBRILLATION: ICD-10-CM

## 2018-04-27 DIAGNOSIS — E11.8 CONTROLLED TYPE 2 DIABETES MELLITUS WITH COMPLICATION, WITHOUT LONG-TERM CURRENT USE OF INSULIN: ICD-10-CM

## 2018-04-27 PROBLEM — R29.898 RIGHT LEG WEAKNESS: Status: RESOLVED | Noted: 2017-12-07 | Resolved: 2018-04-27

## 2018-04-27 PROBLEM — R55 SYNCOPE: Status: RESOLVED | Noted: 2017-04-22 | Resolved: 2018-04-27

## 2018-04-27 PROBLEM — J20.8 ACUTE BRONCHITIS DUE TO OTHER SPECIFIED ORGANISMS: Status: RESOLVED | Noted: 2018-03-19 | Resolved: 2018-04-27

## 2018-04-27 PROBLEM — R47.1 DYSARTHRIA: Status: RESOLVED | Noted: 2017-12-07 | Resolved: 2018-04-27

## 2018-04-27 PROBLEM — R73.9 HYPERGLYCEMIA: Status: RESOLVED | Noted: 2018-01-02 | Resolved: 2018-04-27

## 2018-04-27 PROCEDURE — 3044F HG A1C LEVEL LT 7.0%: CPT | Mod: CPTII,S$GLB,, | Performed by: INTERNAL MEDICINE

## 2018-04-27 PROCEDURE — 99499 UNLISTED E&M SERVICE: CPT | Mod: S$GLB,,, | Performed by: INTERNAL MEDICINE

## 2018-04-27 PROCEDURE — 99214 OFFICE O/P EST MOD 30 MIN: CPT | Mod: S$GLB,,, | Performed by: INTERNAL MEDICINE

## 2018-04-27 PROCEDURE — 99999 PR PBB SHADOW E&M-EST. PATIENT-LVL IV: CPT | Mod: PBBFAC,,, | Performed by: INTERNAL MEDICINE

## 2018-04-27 PROCEDURE — 3008F BODY MASS INDEX DOCD: CPT | Mod: CPTII,S$GLB,, | Performed by: INTERNAL MEDICINE

## 2018-04-27 RX ORDER — TIZANIDINE HYDROCHLORIDE 4 MG/1
CAPSULE, GELATIN COATED ORAL
Qty: 90 CAPSULE | Refills: 0 | OUTPATIENT
Start: 2018-04-27

## 2018-04-27 NOTE — PROGRESS NOTES
Subjective:       Patient ID: Sisi Medel is a 57 y.o. female.    Chief Complaint: Follow-up    HPI  58 y/o woman with h/o sarcoidosis, chronic combined CHF (most recent EF 40-45%), PAF, HTN, ESRD on HD MWF, obesity, YOANDY, chronic respiratory failure, OA, DM2 here for follow up.    Reports swelling in R hand and wrist for past week. First noted change in color and swelling around R thumb; has plaque with small ulcer in web space between R thumb and index finger that had been there for months. Has been seeing Dr Shetty with Dermatology for this, has been getting intralesional kenalog and using betamethasone ointment.   Tylenol helps a little bit relief doesn't last. Not progressively worse but not getting better.  Uses walker, uses cane. Right-handed.     Following with Ortho for her arthritis of knee, had L knee injection done 3/19/18 which helps somewhat. Asking about working with PT to help with walking with more stability and strength.    DM2 / h/o borderline DM - A1c ranging from 5.5 to 6.7 over past 4 years. In the past 3 months has been consistently in DM range from 6.5 - 6.8, most recently 6.6.   On chronic steroids.   Saw DM educator after last visit. Now checking her BG regularly.  Reports BG running in low 100s generally.  Follows with outside eye doctor - has next visit on 4/30/18.  Saw podiatry 2/15/18 and 4/3/18 - developed wounds on great toes bilaterally after using a topical medication on her feet (not sure which, reports rx from neurology), reports these have healed.     Secondary adrenal insufficiency - following with Endocrine for this, on fludricortisone    Atrial fibrillation, CHF - afib onset 6/2016, underwent JARAD/DC cardioversion at that time but has been in paroxysmal AF since. Follows with cardiology and with coumadin clinic.  Saw Dr Chavarria 3/26/18, recommended for f/u at 6 months    ESRD on HD TTS - follows with Dr Aaron Tesfaye for dialysis.  Follows with Vascular Surgery  here for graft problems.  Anuric. Taking renvela. Elevated PTH.  Vitamin D low - taking supplement    Sarcoidosis, CRLD, chronic hypoxia - follows with Dr Harvey with Pulmonology. On chronic steroids, on chronic home O2  Saw Dr Harvey 3/19/18 - treated with doxycycline, steroid dose increased x 7-10 days - now doing much better, back down to prednisone 10mg.     Seizure disorder - on keppra, following with neurology. Due for next visit in June.   H/o TIA 12/2017 - noted as TIA induced by hypotension, no abnormalities on CTA at that time.    Review of Systems   Constitutional: Positive for fatigue (chronic). Negative for activity change, appetite change and fever.   HENT: Negative.    Eyes: Negative for visual disturbance.   Respiratory: Positive for shortness of breath (baseline). Negative for cough.    Cardiovascular: Negative for chest pain, palpitations and leg swelling.   Gastrointestinal: Negative for abdominal pain, constipation, diarrhea and nausea.   Endocrine: Negative for polydipsia and polyuria.   Genitourinary: Positive for decreased urine volume (little to no urination (on HD, chronic)).   Musculoskeletal: Positive for arthralgias (R hand/wrist) and myalgias (muscle aches, foot pain, foot cramps, neck cramps since starting HD; chronic).   Skin: Negative for rash.        As noted   Neurological: Positive for numbness (paresthesias / burning, both feet). Negative for dizziness, seizures, speech difficulty, weakness (generalized, intermittent; not worse currently) and light-headedness.   Psychiatric/Behavioral: Negative for dysphoric mood (related to health issues).         Past medical history, surgical history, and family medical history reviewed and updated as appropriate.    Medications and allergies reviewed.     Objective:          Vitals:    04/27/18 0850   BP: 120/76   BP Location: Left arm   Patient Position: Sitting   Pulse: 86   Temp: 97.7 °F (36.5 °C)   TempSrc: Oral   SpO2: 95%   Weight: 123 kg  "(271 lb 2.7 oz)   Height: 5' 7" (1.702 m)     Body mass index is 42.47 kg/m².  Physical Exam   Constitutional: She is oriented to person, place, and time. She appears well-developed and well-nourished. No distress.   Sitting comfortably. Wearing portable O2.    HENT:   Head: Normocephalic and atraumatic.   Mouth/Throat: Oropharynx is clear and moist.   Eyes: Conjunctivae and EOM are normal. Pupils are equal, round, and reactive to light.   Neck: Normal range of motion. Neck supple. No JVD present.   Cardiovascular: Normal rate, regular rhythm and normal heart sounds.    No murmur heard.  Pulmonary/Chest: Effort normal and breath sounds normal. No respiratory distress. She has no wheezes. She has no rales.   Abdominal: Soft. Bowel sounds are normal. She exhibits no distension. There is no tenderness.   Musculoskeletal: She exhibits no edema or tenderness.   Lymphadenopathy:     She has no cervical adenopathy.   Neurological: She is alert and oriented to person, place, and time. She has normal strength. No cranial nerve deficit or sensory deficit. Gait normal.   Skin: Skin is warm and dry. She is not diaphoretic.   Psychiatric: She has a normal mood and affect.   Vitals reviewed.      Lab Results   Component Value Date    WBC 12.56 03/26/2018    HGB 14.7 03/26/2018    HCT 48.0 03/26/2018     03/26/2018    CHOL 169 12/08/2017    TRIG 98 12/08/2017    HDL 58 12/08/2017    ALT 18 03/26/2018    AST 16 03/26/2018     (L) 03/26/2018    K 5.2 (H) 03/26/2018    CL 90 (L) 03/26/2018    CREATININE 8.9 (H) 03/26/2018    BUN 76 (H) 03/26/2018    CO2 23 03/26/2018    TSH 1.444 04/08/2017    INR 2.0 04/18/2018    HGBA1C 6.6 (H) 01/02/2018       Assessment:       1. Right hand pain    2. Arthritis of left knee    3. Impaired functional mobility, balance, gait, and endurance    4. Chronic respiratory failure with hypoxia    5. Pulmonary sarcoidosis    6. CRLD (chronic restrictive lung disease)    7. Polyneuropathy " associated with underlying disease    8. Current use of long term anticoagulation    9. Permanent atrial fibrillation    10. Controlled type 2 diabetes mellitus with complication, without long-term current use of insulin    11. Vitamin D insufficiency    12. Seizure disorder        Plan:   Sisi was seen today for follow-up.    Diagnoses and all orders for this visit:    Right hand pain  Comments:  tendonitis vs flare of OA, likely related to using walker/cane. Recommended ice, avoiding exacerbating positions, tylenol prn; recommended check in with Ortho  Orders:  -     Ambulatory Referral to Orthopedics    Arthritis of left knee  -     Ambulatory Referral to Physical/Occupational Therapy    Impaired functional mobility, balance, gait, and endurance  Comments:  agree with plan for PT especially as she is at high risk for falls and is on anticoagulant medication  Orders:  -     Ambulatory Referral to Physical/Occupational Therapy    Chronic respiratory failure with hypoxia    Pulmonary sarcoidosis    CRLD (chronic restrictive lung disease)  Comments:  stable, no acute issues; continue home O2, chronic steroid, following with pulmonology    Polyneuropathy associated with underlying disease  Comments:  stable    Current use of long term anticoagulation    Permanent atrial fibrillation  Comments:  stable, continue current medications, follow up with Dr Chavarria at 6 months (9/2018)    Controlled type 2 diabetes mellitus with complication, without long-term current use of insulin  Comments:  not on medication currently; BG do increase with steroids so needs closer monitoring when taking higher steroid dose  continue to follow with podiatry closely    Vitamin D insufficiency  Comments:  continue vitamin D    Seizure disorder  Comments:  stable, continue keppra, follow with neurology    Health maintenance reviewed with patient.     Follow-up in about 3 months (around 7/27/2018) for follow up, coordination of care; earlier  if symptoms do not improve.    Carlos A Caputo MD  Internal Medicine  [facility]  4/27/2018

## 2018-05-01 ENCOUNTER — ANTI-COAG VISIT (OUTPATIENT)
Dept: CARDIOLOGY | Facility: CLINIC | Age: 58
End: 2018-05-01
Payer: MEDICARE

## 2018-05-01 ENCOUNTER — OFFICE VISIT (OUTPATIENT)
Dept: PODIATRY | Facility: CLINIC | Age: 58
End: 2018-05-01
Payer: MEDICARE

## 2018-05-01 VITALS
HEART RATE: 69 BPM | BODY MASS INDEX: 42.53 KG/M2 | DIASTOLIC BLOOD PRESSURE: 60 MMHG | SYSTOLIC BLOOD PRESSURE: 89 MMHG | WEIGHT: 271 LBS | HEIGHT: 67 IN

## 2018-05-01 DIAGNOSIS — L97.512 TOE ULCER, RIGHT, WITH FAT LAYER EXPOSED: ICD-10-CM

## 2018-05-01 DIAGNOSIS — Z99.2 ESRD (END STAGE RENAL DISEASE) ON DIALYSIS: Chronic | ICD-10-CM

## 2018-05-01 DIAGNOSIS — R60.0 BILATERAL LOWER EXTREMITY EDEMA: ICD-10-CM

## 2018-05-01 DIAGNOSIS — G60.9 IDIOPATHIC PERIPHERAL NEUROPATHY: Primary | ICD-10-CM

## 2018-05-01 DIAGNOSIS — N18.6 ESRD (END STAGE RENAL DISEASE) ON DIALYSIS: Chronic | ICD-10-CM

## 2018-05-01 DIAGNOSIS — Z79.01 CURRENT USE OF LONG TERM ANTICOAGULATION: Primary | ICD-10-CM

## 2018-05-01 LAB — INR PPP: 2.6 (ref 2–3)

## 2018-05-01 PROCEDURE — 11042 DBRDMT SUBQ TIS 1ST 20SQCM/<: CPT | Mod: S$GLB,,, | Performed by: PODIATRIST

## 2018-05-01 PROCEDURE — 99499 UNLISTED E&M SERVICE: CPT | Mod: S$GLB,,, | Performed by: PODIATRIST

## 2018-05-01 PROCEDURE — 99999 PR PBB SHADOW E&M-EST. PATIENT-LVL III: CPT | Mod: PBBFAC,,, | Performed by: PODIATRIST

## 2018-05-01 PROCEDURE — 85610 PROTHROMBIN TIME: CPT | Mod: QW,S$GLB,, | Performed by: INTERNAL MEDICINE

## 2018-05-01 NOTE — PROGRESS NOTES
INR within therapeutic range today. Patient reports an open wound on her foot. She denies any bruising, bleeding or other changes that may affect warfarin therapy. Will maintain current dose and follow up in 2 weeks. Patient advised to call coumadin clinic with any changes or concerns.

## 2018-05-01 NOTE — PROGRESS NOTES
Subjective:      Patient ID: Sisi Medel is a 57 y.o. female.    Chief Complaint: Peripheral Vascular Disease ([facility] pcp Dr Caputo  4/27/18) and Toe Pain (lt great toe)    Sisi is a 57 y.o. female who presents to the clinic for evaluation and treatment of high risk feet. Sisi has a past medical history of Anemia in ESRD (end-stage renal disease) (3/7/2016); Anticoagulant long-term use; Cervical radiculopathy (7/20/2015); CHF (congestive heart failure); Chronic combined systolic and diastolic heart failure (6/14/2013); Chronic respiratory failure with hypoxia (04/22/2013); Closed fracture of proximal end of right fibula (6/27/2016); Complications due to renal dialysis device, implant, and graft; DDD (degenerative disc disease), lumbar (6/17/2015); Dependence on renal dialysis; Diabetes mellitus type II, controlled (12/8/2017); ESRD on hemodialysis (2/23/2016); Essential hypertension; Gout; Lateral meniscal tear (5/31/2016); Lumbar stenosis (6/17/2015); Pacemaker; Paroxysmal atrial fibrillation (5/16/2014); Peripheral neuropathy (11/13/2013); Personal history of gastric ulcer (3/19/2013); Sarcoidosis, lung (2009); Secondary pulmonary hypertension (4/7/2015); Seizure disorder (3/19/2013); Seizures; Shingles (11/13/2013); and Thyroid disease. The patient's chief complaint is new wound that appeared on left great toe. She noticed it after she took her sock off and a layer of skin peeled off. She has neuropathy therefore this was not painful. Here to have it assessed. She applied betadine on it immediately (had some at home that she got from dialysis) and put bandaid on the toe. No pain, no other problems. Has hx of PVD and previous leg ulcerations which remain healed.   This patient has documented high risk feet requiring routine maintenance secondary to peripheral neuropathy.        PCP: Carlos A Caputo MD    Date Last Seen by PCP:   Chief Complaint   Patient presents with    Peripheral  Vascular Disease     [facility] pcp Dr Caputo  4/27/18    Toe Pain     lt great toe         Current shoe gear:  Affected Foot: Slip-on shoes     Unaffected Foot: Slip-on shoes    Last encounter in this department: Visit date not found    Hemoglobin A1C   Date Value Ref Range Status   01/02/2018 6.6 (H) 4.0 - 5.6 % Final     Comment:     According to ADA guidelines, hemoglobin A1c <7.0% represents  optimal control in non-pregnant diabetic patients. Different  metrics may apply to specific patient populations.   Standards of Medical Care in Diabetes-2016.  For the purpose of screening for the presence of diabetes:  <5.7%     Consistent with the absence of diabetes  5.7-6.4%  Consistent with increasing risk for diabetes   (prediabetes)  >or=6.5%  Consistent with diabetes  Currently, no consensus exists for use of hemoglobin A1c  for diagnosis of diabetes for children.  This Hemoglobin A1c assay has significant interference with fetal   hemoglobin   (HbF). The results are invalid for patients with abnormal amounts of   HbF,   including those with known Hereditary Persistence   of Fetal Hemoglobin. Heterozygous hemoglobin variants (HbAS, HbAC,   HbAD, HbAE, HbA2) do not significantly interfere with this assay;   however, presence of multiple variants in a sample may impact the %   interference.     12/07/2017 6.5 (H) 4.0 - 5.6 % Final     Comment:     According to ADA guidelines, hemoglobin A1c <7.0% represents  optimal control in non-pregnant diabetic patients. Different  metrics may apply to specific patient populations.   Standards of Medical Care in Diabetes-2016.  For the purpose of screening for the presence of diabetes:  <5.7%     Consistent with the absence of diabetes  5.7-6.4%  Consistent with increasing risk for diabetes   (prediabetes)  >or=6.5%  Consistent with diabetes  Currently, no consensus exists for use of hemoglobin A1c  for diagnosis of diabetes for children.  This Hemoglobin A1c assay has  significant interference with fetal   hemoglobin   (HbF). The results are invalid for patients with abnormal amounts of   HbF,   including those with known Hereditary Persistence   of Fetal Hemoglobin. Heterozygous hemoglobin variants (HbAS, HbAC,   HbAD, HbAE, HbA2) do not significantly interfere with this assay;   however, presence of multiple variants in a sample may impact the %   interference.     09/06/2017 6.8 (H) 4.0 - 5.6 % Final     Comment:     According to ADA guidelines, hemoglobin A1c <7.0% represents  optimal control in non-pregnant diabetic patients. Different  metrics may apply to specific patient populations.   Standards of Medical Care in Diabetes-2016.  For the purpose of screening for the presence of diabetes:  <5.7%     Consistent with the absence of diabetes  5.7-6.4%  Consistent with increasing risk for diabetes   (prediabetes)  >or=6.5%  Consistent with diabetes  Currently, no consensus exists for use of hemoglobin A1c  for diagnosis of diabetes for children.  This Hemoglobin A1c assay has significant interference with fetal   hemoglobin   (HbF). The results are invalid for patients with abnormal amounts of   HbF,   including those with known Hereditary Persistence   of Fetal Hemoglobin. Heterozygous hemoglobin variants (HbAS, HbAC,   HbAD, HbAE, HbA2) do not significantly interfere with this assay;   however, presence of multiple variants in a sample may impact the %   interference.       Past Medical History:   Diagnosis Date    Anemia in ESRD (end-stage renal disease) 3/7/2016    Anticoagulant long-term use     Cervical radiculopathy 7/20/2015    CHF (congestive heart failure)     Chronic combined systolic and diastolic heart failure 6/14/2013    EF 20% 2013, improved with Medical therapy, negative PET 2013    Chronic respiratory failure with hypoxia 04/22/2013    On home oxygen at 2-5 liters    Closed fracture of proximal end of right fibula 6/27/2016    Complications due to  renal dialysis device, implant, and graft     DDD (degenerative disc disease), lumbar 2015    Dependence on renal dialysis     Diabetes mellitus type II, controlled 2017    ESRD on hemodialysis 2016    Essential hypertension     Gout     Lateral meniscal tear 2016    Lumbar stenosis 2015    Pacemaker     Paroxysmal atrial fibrillation 2014    Not on anticoagulation    Peripheral neuropathy 2013    Personal history of gastric ulcer 3/19/2013    Sarcoidosis, lung 2009    Diagnosed in  with ocular manifestation, on 4L home O2 and PO steroids    Secondary pulmonary hypertension 2015    Seizure disorder 3/19/2013    Seizures     Shingles 2013    Thyroid disease        Past Surgical History:   Procedure Laterality Date    ABDOMINAL SURGERY      CARDIAC PACEMAKER PLACEMENT       SECTION      x2    OTHER SURGICAL HISTORY      loop recorder implant    stent to fistula      VASCULAR SURGERY      fistula to L upper arm        Family History   Problem Relation Age of Onset    Kidney failure Mother     Hypertension Mother     Diabetes Mother     Coronary artery disease Father     Hypertension Father     Diabetes Father     Lupus Sister     Diabetes Maternal Grandmother     Colon cancer Neg Hx     Ovarian cancer Neg Hx     Breast cancer Neg Hx        Social History     Social History    Marital status: Single     Spouse name: N/A    Number of children: N/A    Years of education: N/A     Social History Main Topics    Smoking status: Former Smoker     Packs/day: 0.50     Years: 10.00     Types: Cigarettes     Quit date: 1994    Smokeless tobacco: Never Used    Alcohol use No      Comment: rarely    Drug use: No    Sexual activity: Not Currently     Other Topics Concern    Are You Pregnant Or Think You May Be? No    Breast-Feeding No     Social History Narrative    Lives with boyfriend/partner who helps with her care.             Current Outpatient Prescriptions   Medication Sig Dispense Refill    ACZONE 5 % topical gel Apply to face every morning 60 g 2    atorvastatin (LIPITOR) 80 MG tablet TAKE 1 TABLET(80 MG) BY MOUTH EVERY NIGHT 90 tablet 3    betamethasone dipropionate (DIPROLENE) 0.05 % ointment AAA bid hand under occlusion 45 g 3    blood sugar diagnostic Strp 1 strip by Misc.(Non-Drug; Combo Route) route 2 (two) times daily with meals. 100 strip 3    blood-glucose meter kit Use as instructed 1 each 0    cholecalciferol, vitamin D3, 1,000 unit capsule Take 1 capsule (1,000 Units total) by mouth once daily. (Patient taking differently: Take 1,000 Units by mouth every evening. )  0    clobetasol 0.05% (TEMOVATE) 0.05 % Oint AAA hand bid - under occlusion qhs 60 g 3    clopidogrel (PLAVIX) 75 mg tablet Take 1 tablet (75 mg total) by mouth once daily. 30 tablet 11    diclofenac sodium (SOLARAZE) 3 % gel       fludrocortisone (FLORINEF) 0.1 mg Tab Take 100 mcg by mouth 2 (two) times daily.      gabapentin (NEURONTIN) 300 MG capsule TAKE 1 CAPSULE BY MOUTH THREE TIMES DAILY 270 capsule 0    ketorolac 0.5% (ACULAR) 0.5 % Drop instill one drop into both eyes every morning  3    lancets Misc 1 lancet by Misc.(Non-Drug; Combo Route) route 2 (two) times daily with meals. 100 each 3    lancing device Misc 1 Device by Misc.(Non-Drug; Combo Route) route 2 (two) times daily with meals. 1 each 0    levETIRAcetam (KEPPRA) 500 MG Tab Take 1 tablet (500 mg total) by mouth 2 (two) times daily. 180 tablet 3    nortriptyline (PAMELOR) 25 MG capsule Take 1 capsule (25 mg total) by mouth every evening. 30 capsule 11    omeprazole (PRILOSEC) 20 MG capsule TAKE 1 CAPSULE BY MOUTH ONCE DAILY 30 capsule 4    polyethylene glycol (GLYCOLAX) 17 gram/dose powder Take 17 g by mouth once daily. Dissolve in juice. 510 g 0    prednisoLONE acetate (PRED FORTE) 1 % DrpS INSTILL ONE DROP INTO  LEFT EYE THREE TIMES A DAY  3    RENVELA 800 mg Tab  TAKE 1 TABLET BY MOUTH THREE TIMES DAILY WITH MEALS 90 tablet 0    SENSIPAR 30 mg Tab daily with breakfast.       tiZANidine (ZANAFLEX) 4 MG tablet 1 tablet by mouth every 8-12 hours as needed for muscle spasm 90 tablet 0    warfarin (COUMADIN) 5 MG tablet Take 1 tablet (5 mg total) by mouth Daily. Per [facility] (Patient taking differently: Take 5 mg by mouth every evening. Per [facility]) 90 tablet 3    warfarin (COUMADIN) 5 MG tablet TAKE 1 TABLET BY MOUTH EVERYDAY EXCEPT 1 AND 1/2 TABLETS ON MONDAY AND FRIDAY OR AS DIRECTED BY CLINIC 40 tablet 0    midodrine (PROAMATINE) 5 MG Tab Take 2 tablets (10 mg total) by mouth every Mon, Wed, Fri, Sun. 32 tablet 11     No current facility-administered medications for this visit.        Review of patient's allergies indicates:   Allergen Reactions    Bactrim [sulfamethoxazole-trimethoprim] Other (See Comments)     Causes renal failure    Nsaids (non-steroidal anti-inflammatory drug) Other (See Comments)     Renal failure       Review of Systems   Constitution: Negative for chills and fever.   Cardiovascular: Positive for leg swelling. Negative for chest pain and claudication.   Respiratory: Negative for cough and shortness of breath.    Skin: Positive for dry skin, poor wound healing and suspicious lesions.   Musculoskeletal: Positive for myalgias.   Gastrointestinal: Negative for nausea and vomiting.   Neurological: Positive for numbness and paresthesias.   Psychiatric/Behavioral: Negative for altered mental status.           Objective:      Physical Exam   Constitutional: She is oriented to person, place, and time. She appears well-developed and well-nourished.   HENT:   Head: Normocephalic.   Cardiovascular: Intact distal pulses.    Pedal pulses diminished b/l. Toes cool to touch b/l. 2+ edema noted to b/l LE. No vericosities noted to b/l LEs.      Pulmonary/Chest: No respiratory distress.   Musculoskeletal:   Gastrocnemius equinus noted to b/l ankles  with decreased DF noted on exam. MMT 5/5 in DF/PF/Inv/Ev resistance with no reproduction of pain in any direction. Passive range of motion of ankle and pedal joints is painless. Adequate pedal joint ROM.   No pain on Palpation of R great toe wound.    Feet:   Right Foot:   Protective Sensation: 10 sites tested. 0 sites sensed.   Skin Integrity: Positive for ulcer, skin breakdown and dry skin.   Left Foot:   Protective Sensation: 10 sites tested. 0 sites sensed.   Skin Integrity: Positive for dry skin. Negative for ulcer or skin breakdown.   Neurological: She is alert and oriented to person, place, and time. She has normal strength. A sensory deficit is present.   Light touch, proprioception, and sharp/dull sensation are all intact bilaterally. Protective threshold with the [place]ienstein monofilament is completely diminished bilaterally.      Skin: Skin is warm, dry and intact. Lesion noted. No erythema.   Generalized hyperpigmentation to ankle and lower 1/3 of R leg consistent with hemosiderin deposits. Mild dermatitis R lower 1/3 leg. Overall skin is thin, shiny, atrophic to b/l LEs. Loss of pedal hair b/l. Toes are cool to touch b/l.     New ulceration noted to R great toe medial distal aspect.   Measurement: 2.0 x 1.0 x 0.1cm   Base: 100% tranular  Periphery: peeling, loose skin.   Undermining/tunelling: none  Drainage: sanguinous only  Erythema: none  Purulence: none  SOI: none    No other open wounds or lesions noted b/l foot.     Diffuse xerosis noted to plantar aspects of b/l foot/heels.      Psychiatric: She has a normal mood and affect. Her behavior is normal. Judgment and thought content normal.   Vitals reviewed.                Assessment:       Encounter Diagnoses   Name Primary?    Idiopathic peripheral neuropathy Yes    ESRD (end stage renal disease) on dialysis     Bilateral lower extremity edema     Toe ulcer, right, with fat layer exposed          Plan:       Sisi was seen today for  peripheral vascular disease and toe pain.    Diagnoses and all orders for this visit:    Idiopathic peripheral neuropathy  -     Wound care routine (specify)    ESRD (end stage renal disease) on dialysis  -     Wound care routine (specify)    Bilateral lower extremity edema  -     Wound care routine (specify)    Toe ulcer, right, with fat layer exposed  -     Debridement  -     Wound care routine (specify)      I counseled the patient on her conditions, their implications and medical management.    - Shoe inspection. General Foot Education. Patient reminded of the importance of good nutrition. Patient instructed on proper foot hygeine. We discussed wearing proper shoe gear, daily foot inspections, never walking without protective shoe gear, caution putting sharp instruments to feet     - Discussed general foot care:  Wear comfortable, proper fitting shoes. Wash feet daily. Dry well. After drying, apply moisturizer to feet (no lotion to webspaces). Inspect feet daily for skin breaks, blisters, swelling, or redness. Wear cotton socks (preferably white)  Change socks every day. Do NOT walk barefoot. Do NOT use heating pads or warm/hot water soaks     R hallux wound inspected and treated. Excisional debridement preformed today. See separate procedure note.     No SOI at this time. Will treat with topical medication. [place], wound foam, football wit no compression applied to R great toe and foot today. Darco shoe for ambulation.     HH/ wound care dressing changes ordered for dressing change every 3 days with above dressings.     I warned patient to watch for wound recurrence as well as signs and symptoms of infection including redness, drainage, purulence, odor, streaking, fever, chills, etc and I advised her to seek medical attention (ER or urgent car) if these symptoms arise.     RTC 1 week for wound check, sooner PRN

## 2018-05-02 ENCOUNTER — TELEPHONE (OUTPATIENT)
Dept: PODIATRY | Facility: CLINIC | Age: 58
End: 2018-05-02

## 2018-05-02 NOTE — TELEPHONE ENCOUNTER
----- Message from Rhonda Reddy sent at 5/2/2018 10:35 AM CDT -----  Contact: Ewel[facility]/ [phone] Ext [phone]  Ewelina was calling to let office know that pt is not active and to please resend orders to her insurance [employer] before they can 'reactivate her'. Thank you.

## 2018-05-02 NOTE — PROGRESS NOTES
Pt seen by Yesica WARE. I have reviewed her initial findings and agree with her assessment and plan

## 2018-05-02 NOTE — PROCEDURES
"Wound Debridement  Date/Time: 5/1/2018 7:01 PM  Performed by: DOMI CHANG  Authorized by: DOMI CHANG     Time out: Immediately prior to procedure a "time out" was called to verify the correct patient, procedure, equipment, support staff and site/side marked as required.    Consent Done?:  Yes (Verbal)    Preparation: Patient was prepped and draped in usual sterile fashion    Local anesthesia used?: No      Wound Details:    Location:  Right foot    Location:  Right 1st Toe    Type of Debridement:  Excisional       Length (cm):  2       Area (sq cm):  2       Width (cm):  1       Percent Debrided (%):  100       Depth (cm):  0.1       Total Area Debrided (sq cm):  2    Depth of debridement:  Subcutaneous tissue    Tissue debrided:  Dermis, Epidermis and Subcutaneous    Devitalized tissue debrided:  Biofilm, Callus and Fibrin    Instruments:  Curette    Bleeding:  Minimal  Hemostasis Achieved: Yes    Method Used:  Pressure  Patient tolerance:  Patient tolerated the procedure well with no immediate complications      "

## 2018-05-03 DIAGNOSIS — N18.6 END STAGE RENAL FAILURE ON DIALYSIS: Primary | ICD-10-CM

## 2018-05-03 DIAGNOSIS — Z99.2 END STAGE RENAL FAILURE ON DIALYSIS: Primary | ICD-10-CM

## 2018-05-07 ENCOUNTER — OFFICE VISIT (OUTPATIENT)
Dept: NEUROLOGY | Facility: CLINIC | Age: 58
End: 2018-05-07
Payer: MEDICARE

## 2018-05-07 VITALS
WEIGHT: 275.38 LBS | SYSTOLIC BLOOD PRESSURE: 123 MMHG | HEART RATE: 69 BPM | HEIGHT: 68 IN | BODY MASS INDEX: 41.74 KG/M2 | DIASTOLIC BLOOD PRESSURE: 88 MMHG

## 2018-05-07 DIAGNOSIS — G63 POLYNEUROPATHY ASSOCIATED WITH UNDERLYING DISEASE: Primary | ICD-10-CM

## 2018-05-07 PROCEDURE — 3008F BODY MASS INDEX DOCD: CPT | Mod: CPTII,S$GLB,, | Performed by: PSYCHIATRY & NEUROLOGY

## 2018-05-07 PROCEDURE — 99999 PR PBB SHADOW E&M-EST. PATIENT-LVL III: CPT | Mod: PBBFAC,,, | Performed by: PSYCHIATRY & NEUROLOGY

## 2018-05-07 PROCEDURE — 99213 OFFICE O/P EST LOW 20 MIN: CPT | Mod: S$GLB,,, | Performed by: PSYCHIATRY & NEUROLOGY

## 2018-05-07 RX ORDER — GABAPENTIN 600 MG/1
600 TABLET ORAL 2 TIMES DAILY
Qty: 60 TABLET | Refills: 5 | Status: SHIPPED | OUTPATIENT
Start: 2018-05-07 | End: 2018-01-01

## 2018-05-07 NOTE — PROGRESS NOTES
[facility] - NEUROLOGY  [facility] [place]    Date: May 7, 2018   Patient Name: Sisi Medel   MRN: [id number]   PCP: Carlos A Caputo  Referring Provider: No ref. provider found    Assessment:      This is Sisi Medel, 57 y.o. female with complex medical problems including AF on coumadin, pulmonary sarcoid, ESRD on HD with orthostasis and recurrent syncope around dialysis who presents for follow up for her epilepsy.  She has symptoms of painful polyneuropathy noted on EMG 2014, no hx of DM but most likely glucose intolerance from long term steroid use verus sarcoid versus renal disease, strength is intact.     Plan:      -  Increase GBP to 600mg bid  -  Continue LEV    Follow up 3 months     2014 EPILEPSY QUALITY MEASUREMENT (AAN)  1 a. Seizure Frequency: as noted  1b. Seizure Intervention: as noted  2.  a. Etiology: as noted  b. Seizure Type: as noted  c. Epilepsy Syndrome: n/a  3.  a. Side-effects of AED: as noted   b. Intervention for side-effects: as noted  4. Screening for psychiatric or behavioral disorders: as noted  5. Personalized epilepsy safety issues and education: yes  6. Counseling for women of child-bearing potential and epilepsy: yes   7. Referral of treatment-resistant epilepsy to comprehensive epilepsy center (q 2 years): N/A.    The patient was asked to call me/the clinic 1 week after the test(s) are done to obtain results.    The following issues were  all discussed in detail with the patient and family/caregiver(s):    1. Diagnosis, plans, prognosis, medications and possible side-effects, risks and benefits of treatment, other alternatives to AEDs.  2. Risks related to continued seizures, status epilepticus, SUDEP, driving restrictions and seizure precautions ( no baths but showers are ok, no swimming unsupervised, no use of heavy machinery, no use of sharp moving objects, avoid heights).   3. Issues related to pregnancy, OCP and breast  feeding as it relates to epilepsy.  4. The potential of teratogenicity and suicidal risks of anti-epileptic medications.  5.Avoid any activity that compromise patient safety related to seizures.     Questions and concerns raised by the patient and family/care-giver(s) were addressed and they indicated understanding of everything discussed and agreed to plans as above.       I discussed side effects of the medications. I asked the patient to stop the medication if she notices serious adverse effects as we discussed and to seek immediate medical attention at an ER.     Emeka Lester MD  [facility]   Department of Neurology    Subjective:   Seizures remain controlled, main concern is neuropathic pain in feet  Developed painful wounds on feet 4/2018 and was told to stop doxepin cream with some healing since - currently reports questionable benefit from doxepin to begin with  Never started pamelor and currently taking GBP 300mg bid    12/2017  Improvement with compound cream, feels she is more mobile but notes it dries skin; did not start pamelor; planning to visit son in [place] for [place]    [facility] 9/2017:   Ms. Sisi Medel is a 57 y.o. female who presents for follow up for epilepsy  Her seizures remain controlled and her main concern today is painful paresthesias in distal low extremities which started on right where she had shingles.  No relief from GBP, lidocaine/capsaicin cream.    PAST MEDICAL HISTORY:  Past Medical History:   Diagnosis Date    Anemia in ESRD (end-stage renal disease) 3/7/2016    Anticoagulant long-term use     Cervical radiculopathy 7/20/2015    CHF (congestive heart failure)     Chronic combined systolic and diastolic heart failure 6/14/2013    EF 20% 2013, improved with Medical therapy, negative PET 2013    Chronic respiratory failure with hypoxia 04/22/2013    On home oxygen at 2-5 liters    Closed fracture of proximal end of right fibula 6/27/2016    Complications  due to renal dialysis device, implant, and graft     DDD (degenerative disc disease), lumbar 2015    Dependence on renal dialysis     Diabetes mellitus type II, controlled 2017    ESRD on hemodialysis 2016    Essential hypertension     Gout     Lateral meniscal tear 2016    Lumbar stenosis 2015    Pacemaker     Paroxysmal atrial fibrillation 2014    Not on anticoagulation    Peripheral neuropathy 2013    Personal history of gastric ulcer 3/19/2013    Sarcoidosis, lung 2009    Diagnosed in  with ocular manifestation, on 4L home O2 and PO steroids    Secondary pulmonary hypertension 2015    Seizure disorder 3/19/2013    Seizures     Shingles 2013    Thyroid disease        PAST SURGICAL HISTORY:  Past Surgical History:   Procedure Laterality Date    ABDOMINAL SURGERY      CARDIAC PACEMAKER PLACEMENT       SECTION      x2    OTHER SURGICAL HISTORY      loop recorder implant    stent to fistula      VASCULAR SURGERY      fistula to L upper arm        CURRENT MEDS:  Current Outpatient Prescriptions   Medication Sig Dispense Refill    ACZONE 5 % topical gel Apply to face every morning 60 g 2    atorvastatin (LIPITOR) 80 MG tablet TAKE 1 TABLET(80 MG) BY MOUTH EVERY NIGHT 90 tablet 3    betamethasone dipropionate (DIPROLENE) 0.05 % ointment AAA bid hand under occlusion 45 g 3    blood sugar diagnostic Strp 1 strip by Misc.(Non-Drug; Combo Route) route 2 (two) times daily with meals. 100 strip 3    blood-glucose meter kit Use as instructed 1 each 0    cholecalciferol, vitamin D3, 1,000 unit capsule Take 1 capsule (1,000 Units total) by mouth once daily. (Patient taking differently: Take 1,000 Units by mouth every evening. )  0    clobetasol 0.05% (TEMOVATE) 0.05 % Oint AAA hand bid - under occlusion qhs 60 g 3    clopidogrel (PLAVIX) 75 mg tablet Take 1 tablet (75 mg total) by mouth once daily. 30 tablet 11    diclofenac sodium  (SOLARAZE) 3 % gel       fludrocortisone (FLORINEF) 0.1 mg Tab Take 100 mcg by mouth 2 (two) times daily.      gabapentin (NEURONTIN) 300 MG capsule TAKE 1 CAPSULE BY MOUTH THREE TIMES DAILY 270 capsule 0    ketorolac 0.5% (ACULAR) 0.5 % Drop instill one drop into both eyes every morning  3    lancets Misc 1 lancet by Misc.(Non-Drug; Combo Route) route 2 (two) times daily with meals. 100 each 3    lancing device Misc 1 Device by Misc.(Non-Drug; Combo Route) route 2 (two) times daily with meals. 1 each 0    levETIRAcetam (KEPPRA) 500 MG Tab Take 1 tablet (500 mg total) by mouth 2 (two) times daily. 180 tablet 3    nortriptyline (PAMELOR) 25 MG capsule Take 1 capsule (25 mg total) by mouth every evening. 30 capsule 11    omeprazole (PRILOSEC) 20 MG capsule TAKE 1 CAPSULE BY MOUTH ONCE DAILY 30 capsule 4    polyethylene glycol (GLYCOLAX) 17 gram/dose powder Take 17 g by mouth once daily. Dissolve in juice. 510 g 0    prednisoLONE acetate (PRED FORTE) 1 % DrpS INSTILL ONE DROP INTO  LEFT EYE THREE TIMES A DAY  3    RENVELA 800 mg Tab TAKE 1 TABLET BY MOUTH THREE TIMES DAILY WITH MEALS 90 tablet 0    SENSIPAR 30 mg Tab daily with breakfast.       tiZANidine (ZANAFLEX) 4 MG tablet 1 tablet by mouth every 8-12 hours as needed for muscle spasm 90 tablet 0    warfarin (COUMADIN) 5 MG tablet Take 1 tablet (5 mg total) by mouth Daily. Per [facility] (Patient taking differently: Take 5 mg by mouth every evening. Per [facility]) 90 tablet 3    warfarin (COUMADIN) 5 MG tablet TAKE 1 TABLET BY MOUTH EVERYDAY EXCEPT 1 AND 1/2 TABLETS ON MONDAY AND FRIDAY OR AS DIRECTED BY CLINIC 40 tablet 0    midodrine (PROAMATINE) 5 MG Tab Take 2 tablets (10 mg total) by mouth every Mon, Wed, Fri, Sun. 32 tablet 11     No current facility-administered medications for this visit.        ALLERGIES:  Review of patient's allergies indicates:   Allergen Reactions    Bactrim [sulfamethoxazole-trimethoprim] Other (See  "Comments)     Causes renal failure    Nsaids (non-steroidal anti-inflammatory drug) Other (See Comments)     Renal failure       FAMILY HISTORY:  Family History   Problem Relation Age of Onset    Kidney failure Mother     Hypertension Mother     Diabetes Mother     Coronary artery disease Father     Hypertension Father     Diabetes Father     Lupus Sister     Diabetes Maternal Grandmother     Colon cancer Neg Hx     Ovarian cancer Neg Hx     Breast cancer Neg Hx        SOCIAL HISTORY:  Social History   Substance Use Topics    Smoking status: Former Smoker     Packs/day: 0.50     Years: 10.00     Types: Cigarettes     Quit date: 4/22/1994    Smokeless tobacco: Never Used    Alcohol use No      Comment: rarely       Review of Systems:  12 review of systems is negative except for the symptoms mentioned in HPI.        Objective:     Vitals:    05/07/18 0838   BP: 123/88   Pulse: 69   Weight: 124.9 kg (275 lb 5.7 oz)   Height: 5' 8" (1.727 m)       General: NAD, well nourished   Eyes: no tearing, discharge, no erythema   ENT: moist mucous membranes of the oral cavity, nares patent    Neck: Supple, full range of motion  Cardiovascular: Warm and well perfused, pulses equal and symmetrical  Lungs: on O2 with increased work of breathing  Psychiatry: Mood and affect are appropriate   Abdomen: soft, non tender, non distended  Extremeties: right foot bandaged, small healing sores in bilateral thumb-index crease.    Neurological   MENTAL STATUS: Alert and oriented to person, place, and time. Attention and concentration within normal limits. Speech without dysarthria, able to name and repeat without difficulty. Recent and remote memory within normal limits   CRANIAL NERVES: Visual fields intact. PERRL. EOMI. Facial sensation intact. Face symmetrical. Hearing grossly intact. Full shoulder shrug bilaterally. Tongue protrudes midline   SENSORY: Sensation is intact to light touch throughout.    MOTOR: Normal bulk and " tone. No pronator drift.  5/5 deltoid, biceps, triceps, interosseous, hand  bilaterally. 5/5 iliopsoas, knee extension/flexion, foot dorsi/plantarflexion bilaterally.    CEREBELLAR/COORDINATION/GAIT: Heel to shin intact. Finger to nose intact. Normal rapid alternating movements.

## 2018-05-07 NOTE — PATIENT INSTRUCTIONS
Increase gabapentin to 600mg twice daily    Try using Voltaren gel for pain in feet, do not apply directly to healing wounds

## 2018-05-10 ENCOUNTER — OFFICE VISIT (OUTPATIENT)
Dept: PODIATRY | Facility: CLINIC | Age: 58
End: 2018-05-10
Payer: MEDICARE

## 2018-05-10 VITALS
HEART RATE: 70 BPM | HEIGHT: 68 IN | DIASTOLIC BLOOD PRESSURE: 85 MMHG | WEIGHT: 275 LBS | SYSTOLIC BLOOD PRESSURE: 117 MMHG | BODY MASS INDEX: 41.68 KG/M2

## 2018-05-10 DIAGNOSIS — L97.512 TOE ULCER, RIGHT, WITH FAT LAYER EXPOSED: ICD-10-CM

## 2018-05-10 DIAGNOSIS — G60.9 IDIOPATHIC PERIPHERAL NEUROPATHY: Primary | ICD-10-CM

## 2018-05-10 DIAGNOSIS — R60.0 BILATERAL LOWER EXTREMITY EDEMA: ICD-10-CM

## 2018-05-10 PROCEDURE — 11042 DBRDMT SUBQ TIS 1ST 20SQCM/<: CPT | Mod: S$GLB,,, | Performed by: PODIATRIST

## 2018-05-10 PROCEDURE — 99213 OFFICE O/P EST LOW 20 MIN: CPT | Mod: 25,S$GLB,, | Performed by: PODIATRIST

## 2018-05-10 PROCEDURE — 3008F BODY MASS INDEX DOCD: CPT | Mod: CPTII,S$GLB,, | Performed by: PODIATRIST

## 2018-05-10 PROCEDURE — 99999 PR PBB SHADOW E&M-EST. PATIENT-LVL III: CPT | Mod: PBBFAC,,, | Performed by: PODIATRIST

## 2018-05-10 NOTE — PROGRESS NOTES
Subjective:      Patient ID: Sisi Medel is a 57 y.o. female.    Chief Complaint: toe ulcer (great rt toe)    Sisi is a 57 y.o. female who presents to the clinic for evaluation and treatment of high risk feet. Sisi has a past medical history of Anemia in ESRD (end-stage renal disease) (3/7/2016); Anticoagulant long-term use; Cervical radiculopathy (7/20/2015); CHF (congestive heart failure); Chronic combined systolic and diastolic heart failure (6/14/2013); Chronic respiratory failure with hypoxia (04/22/2013); Closed fracture of proximal end of right fibula (6/27/2016); Complications due to renal dialysis device, implant, and graft; DDD (degenerative disc disease), lumbar (6/17/2015); Dependence on renal dialysis; Diabetes mellitus type II, controlled (12/8/2017); ESRD on hemodialysis (2/23/2016); Essential hypertension; Gout; Lateral meniscal tear (5/31/2016); Lumbar stenosis (6/17/2015); Pacemaker; Paroxysmal atrial fibrillation (5/16/2014); Peripheral neuropathy (11/13/2013); Personal history of gastric ulcer (3/19/2013); Sarcoidosis, lung (2009); Secondary pulmonary hypertension (4/7/2015); Seizure disorder (3/19/2013); Seizures; Shingles (11/13/2013); and Thyroid disease. The patient's chief complaint is new wound that appeared on left great toe. She noticed it after she took her sock off and a layer of skin peeled off. She has neuropathy therefore this was not painful. Here to have it assessed. She applied betadine on it immediately (had some at home that she got from dialysis) and put bandaid on the toe. No pain, no other problems. Has hx of PVD and previous leg ulcerations which remain healed.   This patient has documented high risk feet requiring routine maintenance secondary to peripheral neuropathy.        PCP: Carlos A Caputo MD    Date Last Seen by PCP:   Chief Complaint   Patient presents with    toe ulcer     great rt toe         Current shoe gear:  Affected Foot: Slip-on  shoes     Unaffected Foot: Slip-on shoes    Last encounter in this department: Visit date not found    Hemoglobin A1C   Date Value Ref Range Status   01/02/2018 6.6 (H) 4.0 - 5.6 % Final     Comment:     According to ADA guidelines, hemoglobin A1c <7.0% represents  optimal control in non-pregnant diabetic patients. Different  metrics may apply to specific patient populations.   Standards of Medical Care in Diabetes-2016.  For the purpose of screening for the presence of diabetes:  <5.7%     Consistent with the absence of diabetes  5.7-6.4%  Consistent with increasing risk for diabetes   (prediabetes)  >or=6.5%  Consistent with diabetes  Currently, no consensus exists for use of hemoglobin A1c  for diagnosis of diabetes for children.  This Hemoglobin A1c assay has significant interference with fetal   hemoglobin   (HbF). The results are invalid for patients with abnormal amounts of   HbF,   including those with known Hereditary Persistence   of Fetal Hemoglobin. Heterozygous hemoglobin variants (HbAS, HbAC,   HbAD, HbAE, HbA2) do not significantly interfere with this assay;   however, presence of multiple variants in a sample may impact the %   interference.     12/07/2017 6.5 (H) 4.0 - 5.6 % Final     Comment:     According to ADA guidelines, hemoglobin A1c <7.0% represents  optimal control in non-pregnant diabetic patients. Different  metrics may apply to specific patient populations.   Standards of Medical Care in Diabetes-2016.  For the purpose of screening for the presence of diabetes:  <5.7%     Consistent with the absence of diabetes  5.7-6.4%  Consistent with increasing risk for diabetes   (prediabetes)  >or=6.5%  Consistent with diabetes  Currently, no consensus exists for use of hemoglobin A1c  for diagnosis of diabetes for children.  This Hemoglobin A1c assay has significant interference with fetal   hemoglobin   (HbF). The results are invalid for patients with abnormal amounts of   HbF,   including those  with known Hereditary Persistence   of Fetal Hemoglobin. Heterozygous hemoglobin variants (HbAS, HbAC,   HbAD, HbAE, HbA2) do not significantly interfere with this assay;   however, presence of multiple variants in a sample may impact the %   interference.     09/06/2017 6.8 (H) 4.0 - 5.6 % Final     Comment:     According to ADA guidelines, hemoglobin A1c <7.0% represents  optimal control in non-pregnant diabetic patients. Different  metrics may apply to specific patient populations.   Standards of Medical Care in Diabetes-2016.  For the purpose of screening for the presence of diabetes:  <5.7%     Consistent with the absence of diabetes  5.7-6.4%  Consistent with increasing risk for diabetes   (prediabetes)  >or=6.5%  Consistent with diabetes  Currently, no consensus exists for use of hemoglobin A1c  for diagnosis of diabetes for children.  This Hemoglobin A1c assay has significant interference with fetal   hemoglobin   (HbF). The results are invalid for patients with abnormal amounts of   HbF,   including those with known Hereditary Persistence   of Fetal Hemoglobin. Heterozygous hemoglobin variants (HbAS, HbAC,   HbAD, HbAE, HbA2) do not significantly interfere with this assay;   however, presence of multiple variants in a sample may impact the %   interference.       Past Medical History:   Diagnosis Date    Anemia in ESRD (end-stage renal disease) 3/7/2016    Anticoagulant long-term use     Cervical radiculopathy 7/20/2015    CHF (congestive heart failure)     Chronic combined systolic and diastolic heart failure 6/14/2013    EF 20% 2013, improved with Medical therapy, negative PET 2013    Chronic respiratory failure with hypoxia 04/22/2013    On home oxygen at 2-5 liters    Closed fracture of proximal end of right fibula 6/27/2016    Complications due to renal dialysis device, implant, and graft     DDD (degenerative disc disease), lumbar 6/17/2015    Dependence on renal dialysis     Diabetes  mellitus type II, controlled 2017    ESRD on hemodialysis 2016    Essential hypertension     Gout     Lateral meniscal tear 2016    Lumbar stenosis 2015    Pacemaker     Paroxysmal atrial fibrillation 2014    Not on anticoagulation    Peripheral neuropathy 2013    Personal history of gastric ulcer 3/19/2013    Sarcoidosis, lung 2009    Diagnosed in  with ocular manifestation, on 4L home O2 and PO steroids    Secondary pulmonary hypertension 2015    Seizure disorder 3/19/2013    Seizures     Shingles 2013    Thyroid disease        Past Surgical History:   Procedure Laterality Date    ABDOMINAL SURGERY      CARDIAC PACEMAKER PLACEMENT       SECTION      x2    OTHER SURGICAL HISTORY      loop recorder implant    stent to fistula      VASCULAR SURGERY      fistula to L upper arm        Family History   Problem Relation Age of Onset    Kidney failure Mother     Hypertension Mother     Diabetes Mother     Coronary artery disease Father     Hypertension Father     Diabetes Father     Lupus Sister     Diabetes Maternal Grandmother     Colon cancer Neg Hx     Ovarian cancer Neg Hx     Breast cancer Neg Hx        Social History     Social History    Marital status: Single     Spouse name: N/A    Number of children: N/A    Years of education: N/A     Social History Main Topics    Smoking status: Former Smoker     Packs/day: 0.50     Years: 10.00     Types: Cigarettes     Quit date: 1994    Smokeless tobacco: Never Used    Alcohol use No      Comment: rarely    Drug use: No    Sexual activity: Not Currently     Other Topics Concern    Are You Pregnant Or Think You May Be? No    Breast-Feeding No     Social History Narrative    Lives with boyfriend/partner who helps with her care.            Current Outpatient Prescriptions   Medication Sig Dispense Refill    ACZONE 5 % topical gel Apply to face every morning 60 g 2     atorvastatin (LIPITOR) 80 MG tablet TAKE 1 TABLET(80 MG) BY MOUTH EVERY NIGHT 90 tablet 3    betamethasone dipropionate (DIPROLENE) 0.05 % ointment AAA bid hand under occlusion 45 g 3    blood sugar diagnostic Strp 1 strip by Misc.(Non-Drug; Combo Route) route 2 (two) times daily with meals. 100 strip 3    blood-glucose meter kit Use as instructed 1 each 0    cholecalciferol, vitamin D3, 1,000 unit capsule Take 1 capsule (1,000 Units total) by mouth once daily. (Patient taking differently: Take 1,000 Units by mouth every evening. )  0    clobetasol 0.05% (TEMOVATE) 0.05 % Oint AAA hand bid - under occlusion qhs 60 g 3    clopidogrel (PLAVIX) 75 mg tablet Take 1 tablet (75 mg total) by mouth once daily. 30 tablet 11    diclofenac sodium (SOLARAZE) 3 % gel       fludrocortisone (FLORINEF) 0.1 mg Tab Take 100 mcg by mouth 2 (two) times daily.      gabapentin (NEURONTIN) 600 MG tablet Take 1 tablet (600 mg total) by mouth 2 (two) times daily. 60 tablet 5    ketorolac 0.5% (ACULAR) 0.5 % Drop instill one drop into both eyes every morning  3    lancets Misc 1 lancet by Misc.(Non-Drug; Combo Route) route 2 (two) times daily with meals. 100 each 3    lancing device Misc 1 Device by Misc.(Non-Drug; Combo Route) route 2 (two) times daily with meals. 1 each 0    levETIRAcetam (KEPPRA) 500 MG Tab Take 1 tablet (500 mg total) by mouth 2 (two) times daily. 180 tablet 3    omeprazole (PRILOSEC) 20 MG capsule TAKE 1 CAPSULE BY MOUTH ONCE DAILY 30 capsule 4    polyethylene glycol (GLYCOLAX) 17 gram/dose powder Take 17 g by mouth once daily. Dissolve in juice. 510 g 0    prednisoLONE acetate (PRED FORTE) 1 % DrpS INSTILL ONE DROP INTO  LEFT EYE THREE TIMES A DAY  3    RENVELA 800 mg Tab TAKE 1 TABLET BY MOUTH THREE TIMES DAILY WITH MEALS 90 tablet 0    SENSIPAR 30 mg Tab daily with breakfast.       tiZANidine (ZANAFLEX) 4 MG tablet 1 tablet by mouth every 8-12 hours as needed for muscle spasm 90 tablet 0     warfarin (COUMADIN) 5 MG tablet Take 1 tablet (5 mg total) by mouth Daily. Per [facility] (Patient taking differently: Take 5 mg by mouth every evening. Per [facility]) 90 tablet 3    warfarin (COUMADIN) 5 MG tablet TAKE 1 TABLET BY MOUTH EVERYDAY EXCEPT 1 AND 1/2 TABLETS ON MONDAY AND FRIDAY OR AS DIRECTED BY CLINIC 40 tablet 0    midodrine (PROAMATINE) 5 MG Tab Take 2 tablets (10 mg total) by mouth every Mon, Wed, Fri, Sun. 32 tablet 11     No current facility-administered medications for this visit.        Review of patient's allergies indicates:   Allergen Reactions    Bactrim [sulfamethoxazole-trimethoprim] Other (See Comments)     Causes renal failure    Nsaids (non-steroidal anti-inflammatory drug) Other (See Comments)     Renal failure       Review of Systems   Constitution: Negative for chills and fever.   Cardiovascular: Positive for leg swelling. Negative for chest pain and claudication.   Respiratory: Negative for cough and shortness of breath.    Skin: Positive for dry skin, poor wound healing and suspicious lesions.   Musculoskeletal: Positive for myalgias.   Gastrointestinal: Negative for nausea and vomiting.   Neurological: Positive for numbness and paresthesias.   Psychiatric/Behavioral: Negative for altered mental status.           Objective:      Physical Exam   Constitutional: She is oriented to person, place, and time. She appears well-developed and well-nourished.   HENT:   Head: Normocephalic.   Cardiovascular: Intact distal pulses.    Pedal pulses diminished b/l. Toes cool to touch b/l. 2+ edema noted to b/l LE. No vericosities noted to b/l LEs.      Pulmonary/Chest: No respiratory distress.   Musculoskeletal:   Gastrocnemius equinus noted to b/l ankles with decreased DF noted on exam. MMT 5/5 in DF/PF/Inv/Ev resistance with no reproduction of pain in any direction. Passive range of motion of ankle and pedal joints is painless. Adequate pedal joint ROM.   No pain on Palpation of  R great toe wound.    Feet:   Right Foot:   Protective Sensation: 10 sites tested. 0 sites sensed.   Skin Integrity: Positive for ulcer, skin breakdown and dry skin.   Left Foot:   Protective Sensation: 10 sites tested. 0 sites sensed.   Skin Integrity: Positive for dry skin. Negative for ulcer or skin breakdown.   Neurological: She is alert and oriented to person, place, and time. She has normal strength. A sensory deficit is present.   Light touch, proprioception, and sharp/dull sensation are all intact bilaterally. Protective threshold with the [place]-Wienstein monofilament is completely diminished bilaterally.      Skin: Skin is warm, dry and intact. Lesion noted. No erythema.   Generalized hyperpigmentation to ankle and lower 1/3 of R leg consistent with hemosiderin deposits. Mild dermatitis R lower 1/3 leg. Overall skin is thin, shiny, atrophic to b/l LEs. Loss of pedal hair b/l. Toes are cool to touch b/l.     New ulceration noted to R great toe medial distal aspect.   Measurement: 2.0 x 1.0 x 0.1cm   Base: 100% tranular  Periphery: peeling, loose skin.   Undermining/tunelling: none  Drainage: sanguinous only  Erythema: none  Purulence: none  SOI: none    No other open wounds or lesions noted b/l foot.     Diffuse xerosis noted to plantar aspects of b/l foot/heels.      Psychiatric: She has a normal mood and affect. Her behavior is normal. Judgment and thought content normal.   Vitals reviewed.                    Assessment:       Encounter Diagnoses   Name Primary?    Idiopathic peripheral neuropathy Yes    Bilateral lower extremity edema     Toe ulcer, right, with fat layer exposed          Plan:       Sisi was seen today for toe ulcer.    Diagnoses and all orders for this visit:    Idiopathic peripheral neuropathy    Bilateral lower extremity edema    Toe ulcer, right, with fat layer exposed      I counseled the patient on her conditions, their implications and medical management.    - Shoe  inspection. General Foot Education. Patient reminded of the importance of good nutrition. Patient instructed on proper foot hygeine. We discussed wearing proper shoe gear, daily foot inspections, never walking without protective shoe gear, caution putting sharp instruments to feet     - Discussed general foot care:  Wear comfortable, proper fitting shoes. Wash feet daily. Dry well. After drying, apply moisturizer to feet (no lotion to webspaces). Inspect feet daily for skin breaks, blisters, swelling, or redness. Wear cotton socks (preferably white)  Change socks every day. Do NOT walk barefoot. Do NOT use heating pads or warm/hot water soaks     R hallux wound inspected and treated. Excisional debridement preformed today. See separate procedure note.     No SOI at this time. Will treat with topical medication. [place], wound foam, football wit no compression applied to R great toe and foot today. Darco shoe for ambulation.     HH/ wound care dressing changes ordered for dressing change every 3 days with above dressings.     I warned patient to watch for wound recurrence as well as signs and symptoms of infection including redness, drainage, purulence, odor, streaking, fever, chills, etc and I advised her to seek medical attention (ER or urgent car) if these symptoms arise.     RTC 1 week for wound check, sooner PRN

## 2018-05-10 NOTE — PROCEDURES
"Wound Debridement  Date/Time: 5/10/2018 2:11 PM  Performed by: DOMI CHANG  Authorized by: DOMI CHANG     Time out: Immediately prior to procedure a "time out" was called to verify the correct patient, procedure, equipment, support staff and site/side marked as required.    Consent Done?:  Yes (Verbal)    Preparation: Patient was prepped and draped in usual sterile fashion    Local anesthesia used?: No      Wound Details:    Location:  Right foot    Location:  Right 1st Toe    Type of Debridement:  Excisional       Length (cm):  1.3       Area (sq cm):  1.17       Width (cm):  0.9       Percent Debrided (%):  100       Depth (cm):  0.1       Total Area Debrided (sq cm):  1.17    Depth of debridement:  Subcutaneous tissue    Tissue debrided:  Dermis, Epidermis and Subcutaneous    Devitalized tissue debrided:  Biofilm, Callus and Fibrin    Instruments:  Curette    Bleeding:  Minimal  Hemostasis Achieved: Yes    Method Used:  Pressure  Patient tolerance:  Patient tolerated the procedure well with no immediate complications      "

## 2018-05-10 NOTE — PROGRESS NOTES
Subjective:      Patient ID: Sisi Medel is a 57 y.o. female.    Chief Complaint: toe ulcer (great rt toe)    Sisi is a 57 y.o. female who presents to the clinic for evaluation and treatment of high risk feet. Sisi has a past medical history of Anemia in ESRD (end-stage renal disease) (3/7/2016); Anticoagulant long-term use; Cervical radiculopathy (7/20/2015); CHF (congestive heart failure); Chronic combined systolic and diastolic heart failure (6/14/2013); Chronic respiratory failure with hypoxia (04/22/2013); Closed fracture of proximal end of right fibula (6/27/2016); Complications due to renal dialysis device, implant, and graft; DDD (degenerative disc disease), lumbar (6/17/2015); Dependence on renal dialysis; Diabetes mellitus type II, controlled (12/8/2017); ESRD on hemodialysis (2/23/2016); Essential hypertension; Gout; Lateral meniscal tear (5/31/2016); Lumbar stenosis (6/17/2015); Pacemaker; Paroxysmal atrial fibrillation (5/16/2014); Peripheral neuropathy (11/13/2013); Personal history of gastric ulcer (3/19/2013); Sarcoidosis, lung (2009); Secondary pulmonary hypertension (4/7/2015); Seizure disorder (3/19/2013); Seizures; Shingles (11/13/2013); and Thyroid disease. 1 week follow up for R great toe ulceration. States Football dressing came off and she tried her best to reapply it. States it was put on too loose. Otherwise no new concerns. Doing well. Here for wound check.  This patient has documented high risk feet requiring routine maintenance secondary to peripheral neuropathy.        PCP: Carlos A Caputo MD    Date Last Seen by PCP:   Chief Complaint   Patient presents with    toe ulcer     great rt toe         Current shoe gear:  Affected Foot: Slip-on shoes     Unaffected Foot: Slip-on shoes    Last encounter in this department: Visit date not found    Hemoglobin A1C   Date Value Ref Range Status   01/02/2018 6.6 (H) 4.0 - 5.6 % Final     Comment:     According to ADA guidelines,  hemoglobin A1c <7.0% represents  optimal control in non-pregnant diabetic patients. Different  metrics may apply to specific patient populations.   Standards of Medical Care in Diabetes-2016.  For the purpose of screening for the presence of diabetes:  <5.7%     Consistent with the absence of diabetes  5.7-6.4%  Consistent with increasing risk for diabetes   (prediabetes)  >or=6.5%  Consistent with diabetes  Currently, no consensus exists for use of hemoglobin A1c  for diagnosis of diabetes for children.  This Hemoglobin A1c assay has significant interference with fetal   hemoglobin   (HbF). The results are invalid for patients with abnormal amounts of   HbF,   including those with known Hereditary Persistence   of Fetal Hemoglobin. Heterozygous hemoglobin variants (HbAS, HbAC,   HbAD, HbAE, HbA2) do not significantly interfere with this assay;   however, presence of multiple variants in a sample may impact the %   interference.     12/07/2017 6.5 (H) 4.0 - 5.6 % Final     Comment:     According to ADA guidelines, hemoglobin A1c <7.0% represents  optimal control in non-pregnant diabetic patients. Different  metrics may apply to specific patient populations.   Standards of Medical Care in Diabetes-2016.  For the purpose of screening for the presence of diabetes:  <5.7%     Consistent with the absence of diabetes  5.7-6.4%  Consistent with increasing risk for diabetes   (prediabetes)  >or=6.5%  Consistent with diabetes  Currently, no consensus exists for use of hemoglobin A1c  for diagnosis of diabetes for children.  This Hemoglobin A1c assay has significant interference with fetal   hemoglobin   (HbF). The results are invalid for patients with abnormal amounts of   HbF,   including those with known Hereditary Persistence   of Fetal Hemoglobin. Heterozygous hemoglobin variants (HbAS, HbAC,   HbAD, HbAE, HbA2) do not significantly interfere with this assay;   however, presence of multiple variants in a sample may  impact the %   interference.     09/06/2017 6.8 (H) 4.0 - 5.6 % Final     Comment:     According to ADA guidelines, hemoglobin A1c <7.0% represents  optimal control in non-pregnant diabetic patients. Different  metrics may apply to specific patient populations.   Standards of Medical Care in Diabetes-2016.  For the purpose of screening for the presence of diabetes:  <5.7%     Consistent with the absence of diabetes  5.7-6.4%  Consistent with increasing risk for diabetes   (prediabetes)  >or=6.5%  Consistent with diabetes  Currently, no consensus exists for use of hemoglobin A1c  for diagnosis of diabetes for children.  This Hemoglobin A1c assay has significant interference with fetal   hemoglobin   (HbF). The results are invalid for patients with abnormal amounts of   HbF,   including those with known Hereditary Persistence   of Fetal Hemoglobin. Heterozygous hemoglobin variants (HbAS, HbAC,   HbAD, HbAE, HbA2) do not significantly interfere with this assay;   however, presence of multiple variants in a sample may impact the %   interference.       Past Medical History:   Diagnosis Date    Anemia in ESRD (end-stage renal disease) 3/7/2016    Anticoagulant long-term use     Cervical radiculopathy 7/20/2015    CHF (congestive heart failure)     Chronic combined systolic and diastolic heart failure 6/14/2013    EF 20% 2013, improved with Medical therapy, negative PET 2013    Chronic respiratory failure with hypoxia 04/22/2013    On home oxygen at 2-5 liters    Closed fracture of proximal end of right fibula 6/27/2016    Complications due to renal dialysis device, implant, and graft     DDD (degenerative disc disease), lumbar 6/17/2015    Dependence on renal dialysis     Diabetes mellitus type II, controlled 12/8/2017    ESRD on hemodialysis 2/23/2016    Essential hypertension     Gout     Lateral meniscal tear 5/31/2016    Lumbar stenosis 6/17/2015    Pacemaker     Paroxysmal atrial fibrillation  2014    Not on anticoagulation    Peripheral neuropathy 2013    Personal history of gastric ulcer 3/19/2013    Sarcoidosis, lung 2009    Diagnosed in  with ocular manifestation, on 4L home O2 and PO steroids    Secondary pulmonary hypertension 2015    Seizure disorder 3/19/2013    Seizures     Shingles 2013    Thyroid disease        Past Surgical History:   Procedure Laterality Date    ABDOMINAL SURGERY      CARDIAC PACEMAKER PLACEMENT       SECTION      x2    OTHER SURGICAL HISTORY      loop recorder implant    stent to fistula      VASCULAR SURGERY      fistula to L upper arm        Family History   Problem Relation Age of Onset    Kidney failure Mother     Hypertension Mother     Diabetes Mother     Coronary artery disease Father     Hypertension Father     Diabetes Father     Lupus Sister     Diabetes Maternal Grandmother     Colon cancer Neg Hx     Ovarian cancer Neg Hx     Breast cancer Neg Hx        Social History     Social History    Marital status: Single     Spouse name: N/A    Number of children: N/A    Years of education: N/A     Social History Main Topics    Smoking status: Former Smoker     Packs/day: 0.50     Years: 10.00     Types: Cigarettes     Quit date: 1994    Smokeless tobacco: Never Used    Alcohol use No      Comment: rarely    Drug use: No    Sexual activity: Not Currently     Other Topics Concern    Are You Pregnant Or Think You May Be? No    Breast-Feeding No     Social History Narrative    Lives with boyfriend/partner who helps with her care.            Current Outpatient Prescriptions   Medication Sig Dispense Refill    ACZONE 5 % topical gel Apply to face every morning 60 g 2    atorvastatin (LIPITOR) 80 MG tablet TAKE 1 TABLET(80 MG) BY MOUTH EVERY NIGHT 90 tablet 3    betamethasone dipropionate (DIPROLENE) 0.05 % ointment AAA bid hand under occlusion 45 g 3    blood sugar diagnostic Strp 1 strip by  Misc.(Non-Drug; Combo Route) route 2 (two) times daily with meals. 100 strip 3    blood-glucose meter kit Use as instructed 1 each 0    cholecalciferol, vitamin D3, 1,000 unit capsule Take 1 capsule (1,000 Units total) by mouth once daily. (Patient taking differently: Take 1,000 Units by mouth every evening. )  0    clobetasol 0.05% (TEMOVATE) 0.05 % Oint AAA hand bid - under occlusion qhs 60 g 3    clopidogrel (PLAVIX) 75 mg tablet Take 1 tablet (75 mg total) by mouth once daily. 30 tablet 11    diclofenac sodium (SOLARAZE) 3 % gel       fludrocortisone (FLORINEF) 0.1 mg Tab Take 100 mcg by mouth 2 (two) times daily.      gabapentin (NEURONTIN) 600 MG tablet Take 1 tablet (600 mg total) by mouth 2 (two) times daily. 60 tablet 5    ketorolac 0.5% (ACULAR) 0.5 % Drop instill one drop into both eyes every morning  3    lancets Misc 1 lancet by Misc.(Non-Drug; Combo Route) route 2 (two) times daily with meals. 100 each 3    lancing device Misc 1 Device by Misc.(Non-Drug; Combo Route) route 2 (two) times daily with meals. 1 each 0    levETIRAcetam (KEPPRA) 500 MG Tab Take 1 tablet (500 mg total) by mouth 2 (two) times daily. 180 tablet 3    omeprazole (PRILOSEC) 20 MG capsule TAKE 1 CAPSULE BY MOUTH ONCE DAILY 30 capsule 4    polyethylene glycol (GLYCOLAX) 17 gram/dose powder Take 17 g by mouth once daily. Dissolve in juice. 510 g 0    prednisoLONE acetate (PRED FORTE) 1 % DrpS INSTILL ONE DROP INTO  LEFT EYE THREE TIMES A DAY  3    RENVELA 800 mg Tab TAKE 1 TABLET BY MOUTH THREE TIMES DAILY WITH MEALS 90 tablet 0    SENSIPAR 30 mg Tab daily with breakfast.       tiZANidine (ZANAFLEX) 4 MG tablet 1 tablet by mouth every 8-12 hours as needed for muscle spasm 90 tablet 0    warfarin (COUMADIN) 5 MG tablet Take 1 tablet (5 mg total) by mouth Daily. Per [facility] (Patient taking differently: Take 5 mg by mouth every evening. Per [facility]) 90 tablet 3    warfarin (COUMADIN) 5 MG tablet TAKE 1  TABLET BY MOUTH EVERYDAY EXCEPT 1 AND 1/2 TABLETS ON MONDAY AND FRIDAY OR AS DIRECTED BY CLINIC 40 tablet 0    midodrine (PROAMATINE) 5 MG Tab Take 2 tablets (10 mg total) by mouth every Mon, Wed, Fri, Sun. 32 tablet 11     No current facility-administered medications for this visit.        Review of patient's allergies indicates:   Allergen Reactions    Bactrim [sulfamethoxazole-trimethoprim] Other (See Comments)     Causes renal failure    Nsaids (non-steroidal anti-inflammatory drug) Other (See Comments)     Renal failure       Review of Systems   Constitution: Negative for chills and fever.   Cardiovascular: Positive for leg swelling. Negative for chest pain and claudication.   Respiratory: Negative for cough and shortness of breath.    Skin: Positive for dry skin, poor wound healing and suspicious lesions.   Musculoskeletal: Positive for myalgias.   Gastrointestinal: Negative for nausea and vomiting.   Neurological: Positive for numbness and paresthesias.   Psychiatric/Behavioral: Negative for altered mental status.           Objective:      Physical Exam   Constitutional: She is oriented to person, place, and time. She appears well-developed and well-nourished.   HENT:   Head: Normocephalic.   Cardiovascular: Intact distal pulses.    Pedal pulses diminished b/l. Toes cool to touch b/l. 2+ edema noted to b/l LE. No vericosities noted to b/l LEs.      Pulmonary/Chest: No respiratory distress.   Musculoskeletal:   Gastrocnemius equinus noted to b/l ankles with decreased DF noted on exam. MMT 5/5 in DF/PF/Inv/Ev resistance with no reproduction of pain in any direction. Passive range of motion of ankle and pedal joints is painless. Adequate pedal joint ROM.   No pain on Palpation of R great toe wound.    Feet:   Right Foot:   Protective Sensation: 10 sites tested. 0 sites sensed.   Skin Integrity: Positive for ulcer, skin breakdown and dry skin.   Left Foot:   Protective Sensation: 10 sites tested. 0 sites  sensed.   Skin Integrity: Positive for dry skin. Negative for ulcer or skin breakdown.   Neurological: She is alert and oriented to person, place, and time. She has normal strength. A sensory deficit is present.   Light touch, proprioception, and sharp/dull sensation are all intact bilaterally. Protective threshold with the Otis-Wienstein monofilament is completely diminished bilaterally.      Skin: Skin is warm, dry and intact. Lesion noted. No erythema.   Generalized hyperpigmentation to ankle and lower 1/3 of R leg consistent with hemosiderin deposits. Mild dermatitis R lower 1/3 leg. Overall skin is thin, shiny, atrophic to b/l LEs. Loss of pedal hair b/l. Toes are cool to touch b/l.     New ulceration noted to R great toe medial distal aspect.   Measurement: 1.3 x 0.9 x 0.1cm   Base: 100% granular  Periphery: peeling, loose skin.   Undermining/tunelling: none  Drainage: sanguinous only  Erythema: none  Purulence: none  SOI: none    No other open wounds or lesions noted b/l foot.     Diffuse xerosis noted to plantar aspects of b/l foot/heels.      Psychiatric: She has a normal mood and affect. Her behavior is normal. Judgment and thought content normal.   Vitals reviewed.                    Assessment:       Encounter Diagnoses   Name Primary?    Idiopathic peripheral neuropathy Yes    Bilateral lower extremity edema     Toe ulcer, right, with fat layer exposed          Plan:       Sisi was seen today for toe ulcer.    Diagnoses and all orders for this visit:    Idiopathic peripheral neuropathy    Bilateral lower extremity edema    Toe ulcer, right, with fat layer exposed      I counseled the patient on her conditions, their implications and medical management.    - Shoe inspection. General Foot Education. Patient reminded of the importance of good nutrition. Patient instructed on proper foot hygeine. We discussed wearing proper shoe gear, daily foot inspections, never walking without protective shoe  gear, caution putting sharp instruments to feet     - Discussed general foot care:  Wear comfortable, proper fitting shoes. Wash feet daily. Dry well. After drying, apply moisturizer to feet (no lotion to webspaces). Inspect feet daily for skin breaks, blisters, swelling, or redness. Wear cotton socks (preferably white)  Change socks every day. Do NOT walk barefoot. Do NOT use heating pads or warm/hot water soaks     R hallux wound inspected and treated. Excisional debridement preformed today. See separate procedure note.     No SOI at this time. Applied [place], wound foam, football with no compression applied to R great toe and foot today. 2 rolls of cast padding and extra coban applied to reinforce dressing. Darco shoe for ambulation.     HH/ wound care dressing changes ordered for dressing change every 3 days with above dressings.     I warned patient to watch for wound recurrence as well as signs and symptoms of infection including redness, drainage, purulence, odor, streaking, fever, chills, etc and I advised her to seek medical attention (ER or urgent car) if these symptoms arise.     RTC 1 week for wound check, sooner PRN

## 2018-05-11 ENCOUNTER — HOSPITAL ENCOUNTER (OUTPATIENT)
Dept: VASCULAR SURGERY | Facility: CLINIC | Age: 58
Discharge: HOME | End: 2018-05-11
Attending: SURGERY
Payer: MEDICARE

## 2018-05-11 ENCOUNTER — OFFICE VISIT (OUTPATIENT)
Dept: VASCULAR SURGERY | Facility: CLINIC | Age: 58
End: 2018-05-11
Attending: SURGERY
Payer: MEDICARE

## 2018-05-11 ENCOUNTER — TELEPHONE (OUTPATIENT)
Dept: ORTHOPEDICS | Facility: CLINIC | Age: 58
End: 2018-05-11

## 2018-05-11 VITALS
HEIGHT: 67 IN | BODY MASS INDEX: 42.18 KG/M2 | DIASTOLIC BLOOD PRESSURE: 74 MMHG | SYSTOLIC BLOOD PRESSURE: 113 MMHG | HEART RATE: 69 BPM | WEIGHT: 268.75 LBS | TEMPERATURE: 99 F

## 2018-05-11 DIAGNOSIS — Z99.2 END STAGE RENAL FAILURE ON DIALYSIS: ICD-10-CM

## 2018-05-11 DIAGNOSIS — N18.6 END STAGE RENAL FAILURE ON DIALYSIS: ICD-10-CM

## 2018-05-11 DIAGNOSIS — T82.858D AV GRAFT STENOSIS, SUBSEQUENT ENCOUNTER: Primary | ICD-10-CM

## 2018-05-11 DIAGNOSIS — M79.641 RIGHT HAND PAIN: Primary | ICD-10-CM

## 2018-05-11 PROCEDURE — 99999 PR PBB SHADOW E&M-EST. PATIENT-LVL IV: CPT | Mod: PBBFAC,,, | Performed by: SURGERY

## 2018-05-11 PROCEDURE — 93990 DOPPLER FLOW TESTING: CPT | Mod: S$GLB,,, | Performed by: SURGERY

## 2018-05-11 PROCEDURE — 99214 OFFICE O/P EST MOD 30 MIN: CPT | Mod: S$GLB,,, | Performed by: SURGERY

## 2018-05-11 PROCEDURE — 3008F BODY MASS INDEX DOCD: CPT | Mod: CPTII,S$GLB,, | Performed by: SURGERY

## 2018-05-11 NOTE — PROGRESS NOTES
Sisi Medel  04/02/2018    HPI:  Patient is a 55 y.o.  female with a h/o HTN, HLD, CHF (EF 20%), COPD on home O2, paroxysmal Afib (on Coumadin), seizure disorder, ESRD on HD TThS via LUE AVG (placed 2/3/16) who presents to clinic today for follow up. She was last seen in our clinic on 1/26/18. She underwent LUE fistulagram and PTA arterial inflow (7 x 40 [place]). She has done well since her last visit, however, still continuing to have elevated venous pressure on the HD circuit. She is able to reliably complete each HD session.  She is feeling well with no complaints today. Ultrasound shows no significant areas of concern.    S/p   2/3/16: LUE AVG creation (Dr Villafana)  6/21/16: Percutaneous mechanical thrombectomy w [place] Angiojet AVX; 4fr OTW embolectomy of arterial inflow   PTA outflow stenosis with a 7x40 mm balloon  10/12/16: fistulogram (75% stenosis noted), left upper extremity AV graft PTA venous outflow with resolution of stenosis noted  2/2017 Declot and venous outflow PTA and stent with 8 x 50 VIABAHN  5/15/2017: PTA outflow stenosis (8x60 [place]ang); Stent outflow stenosis (8x50 Vi[place]ahn)  10/12/17: PTA LUE AV graft (brachial vein) x2: (7x60 [place]); (8x40 [place])   01/10/18: PTA outflow st[place] (8x40 [place]) and (9x40 [place])   2/2/18: PTA arterial inflow (7 x 40 [place])    No MI/stroke  Tobacco use: Former smoker     Past Medical History   Diagnosis Date    Anemia in ESRD (end-stage renal disease) 3/7/2016    Cervical radiculopathy 7/20/2015    Chronic combined systolic and diastolic heart failure 6/14/2013     EF 20% 2013, improved with Medical therapy, negative PET 2013    Chronic respiratory failure with hypoxia 04/22/2013     On home oxygen at 2-5 liters    Chronic rhinitis 10/2/2013    DDD (degenerative disc disease), lumbar 6/17/2015    ESRD on hemodialysis 2/23/2016    Essential hypertension     Gout     Hyperlipidemia 3/7/2016    Lateral meniscal tear  2016    Lumbar stenosis 2015    Morbid obesity 8/15/2013    Paroxysmal atrial fibrillation 2014     Not on anticoagulation    Peripheral neuropathy 2013    Personal history of fall 2014    Personal history of gastric ulcer 3/19/2013    Sarcoidosis, lung 2009     Diagnosed in  with ocular manifestation, on 4L home O2 and PO steroids    Secondary pulmonary hypertension 2015    Seizure disorder 3/19/2013    Shingles 2013    Spondylarthrosis 2015     Past Surgical History   Procedure Laterality Date     section, classic      Abdominal surgery      Vascular surgery       Family History   Problem Relation Age of Onset    Kidney failure Mother     Hypertension Mother     Diabetes Mother     Coronary artery disease Father     Hypertension Father     Diabetes Father     Lupus Sister     Diabetes Maternal Grandmother     Asthma Neg Hx     Emphysema Neg Hx     Melanoma Neg Hx     Psoriasis Neg Hx      Social History     Social History    Marital status: Single     Spouse name: N/A    Number of children: N/A    Years of education: N/A     Occupational History    Not on file.     Social History Main Topics    Smoking status: Former Smoker     Packs/day: 0.50     Years: 10.00     Types: Cigarettes     Quit date: 1994    Smokeless tobacco: Never Used    Alcohol use No    Drug use: No    Sexual activity: No     Other Topics Concern    Not on file     Social History Narrative    Lives with boyfriend/partner who helps with her care.     Plans to travel to [place] for 2 weeks in 2016     Current Outpatient Prescriptions on File Prior to Visit   Medication Sig    ACZONE 5 % topical gel Apply topically once daily.     allopurinol (ZYLOPRIM) 100 MG tablet Take 1 tablet (100 mg total) by mouth once daily.    amiodarone (PACERONE) 200 MG Tab Take 1 tablet (200 mg total) by mouth every morning.    ammonium lactate (LAC-HYDRIN) 12 % lotion Apply  topically as needed (for scars on arms).     aspirin 81 MG Chew Take 81 mg by mouth every morning.    atorvastatin (LIPITOR) 80 MG tablet Take 80 mg by mouth once daily.     capsicum 0.075% (ZOSTRIX) 0.075 % topical cream Apply topically 3 (three) times daily. For neuropathic pain of feet    gabapentin (NEURONTIN) 100 MG capsule TAKE ONE CAPSULE BY MOUTH THREE TIMES DAILY (Patient taking differently: TAKE ONE CAPSULE BY MOUTH THREE TIMES DAILY-noon, evening, bedtime)    levetiracetam (KEPPRA) 500 MG Tab TAKE 1 TABLET BY MOUTH TWICE DAILY.    melatonin 5 mg Tab Take 1 tablet by mouth nightly.    midodrine (PROAMATINE) 10 MG tablet     midodrine (PROAMATINE) 5 MG Tab     NEPHRO-LINDA 0.8 mg Tab TK 1 T PO  QD    ondansetron (ZOFRAN-ODT) 8 MG TbDL Take 1 tablet (8 mg total) by mouth every 8 (eight) hours as needed.    oxycodone-acetaminophen (PERCOCET) 7.5-325 mg per tablet Take 1 tablet by mouth every 4 (four) hours as needed.    predniSONE (DELTASONE) 10 MG tablet TAKE 3 TABLETS BY MOUTH FOR 7 DAYS, THEN 2 TABLETS FOR 7 DAYS, THEN 1 TABLET EVERY DAY AFTER.    RENVELA 800 mg Tab TAKE 1 TABLET BY MOUTH THREE TIMES DAILY WITH MEALS    tizanidine 4 mg Cap Take 4 mg by mouth 2 (two) times daily as needed. (Patient taking differently: Take 4 mg by mouth daily as needed (for muscle spasms.). )    warfarin (COUMADIN) 5 MG tablet TAKE 1 TABLET(5 MG) BY MOUTH DAILY     No current facility-administered medications on file prior to visit.        REVIEW OF SYSTEMS:  General: negative; ENT: negative; Allergy and Immunology: negative; Hematological and Lymphatic: Negative; Endocrine: negative; Respiratory: no cough, shortness of breath, or wheezing; Cardiovascular: no chest pain or dyspnea on exertion; Gastrointestinal: no abdominal pain/back, change in bowel habits, or bloody stools; Genito-Urinary: no dysuria, trouble voiding, or hematuria; Musculoskeletal: no pain, numbness, to LUE  Neurological: no TIA or stroke  symptoms; Psychiatric: no nervousness, anxiety or depression.    PHYSICAL EXAM:   Vitals:    18 1429   BP: 113/74   Pulse: 69   Temp: 98.5 °F (36.9 °C)         General appearance:  Alert, well-appearing, and in no distress.  Oriented to person, place, and time   Neurological:  Normal speech, no focal findings noted; CN II - XII grossly intact           Musculoskeletal: Digits/nail without cyanosis/clubbing.  Normal muscle strength/tone.                 Neck: Supple, no significant adenopathy; thyroid is not enlarged                Chest:  Clear to auscultation, symmetric air entry      No use of accessory muscles             Cardiac: Normal rate and regular rhythm, S1 and S2 normal; PMI non-displaced          Abdomen: Soft, non-tender, non-distended, no masses or organomegaly      Extremities:   LUE AVG with palpable thrill, no increased pulsatility, 2+ distal radial pulse      LAB RESULTS:  Lab Results   Component Value Date    WBC 12.56 2018    HGB 14.7 2018    HCT 48.0 2018    MCV 94 2018     2018     BMP  Lab Results   Component Value Date     (L) 2018    K 5.2 (H) 2018    CL 90 (L) 2018    CO2 23 2018    BUN 76 (H) 2018    CREATININE 8.9 (H) 2018    CALCIUM 9.3 2018    ANIONGAP 19 (H) 2018    ESTGFRAFRICA 5.2 (A) 2018    EGFRNONAA 4.5 (A) 2018     Lab Results   Component Value Date    HGBA1C 6.6 (H) 2018       IMAGIN18  Inflow-156  arterial anastomosis-343  proximal graft-236  mid graft-248  distal graft-204  venous anastomosis-178  venous outflow(stent)-91  venous outflow(distal stent)- 191                   volume flow: 1804 ml./min.     VAS US HD ACCESS (18):  Inflow-156  arterial anastomosis-343  proximal graft-236  mid graft-248  distal graft-204  venous anastomosis-178  venous outflow(stent)-91  venous outflow(distal stent)- 191                   volume flow: 1804 ml./min.      Overall Impression:  Color flow duplex exam reveals a patent left hemodialysis AV graft and outflow stents. Mild velocity elevations are noted within the most distal stent. Volume flow at mid graft level measures 1804 ml./min.    01/26/18 Hemodialysis US:  arterial anastomosis-387  proximal graft-177  mid graft-248  distal graft-169  venous anastomosis-177  venous outflow(stent)-155  venous confluence- 191                   volume flow: 1467 ml./min.   Velocity elevation of 636 cm/s is noted in the proximal graft with a visual narrowing - suggestive of a focal stenosis.       IMP/PLAN:  55 y.o. female with HTN, HLD, CHF (EF 20%), COPD on home O2, paroxysmal Afib (on Coumadin), seizure disorder, ESRD on HD TThS via LUE AVG. Doing well today with no concerning findings on ultrasound.    - Ultrasound reviewed - no evidence of hemodynamically significant stenosis  - Return to clinic in 3 months with graft duplex  - continue statin, coumadin    Yonatan Guerra MD  Vascular/Endovascular Surgery Fellow      STAFF ADDENDUM    I have reviewed the relevant data and the resident's assessment and agree with the findings and plan as outlined above.    Torrey Villafana MD  Vascular & Endovascular Surgery

## 2018-05-14 ENCOUNTER — OFFICE VISIT (OUTPATIENT)
Dept: ORTHOPEDICS | Facility: CLINIC | Age: 58
End: 2018-05-14
Payer: MEDICARE

## 2018-05-14 ENCOUNTER — CLINICAL SUPPORT (OUTPATIENT)
Dept: REHABILITATION | Facility: HOSPITAL | Age: 58
End: 2018-05-14
Attending: INTERNAL MEDICINE
Payer: MEDICARE

## 2018-05-14 ENCOUNTER — HOSPITAL ENCOUNTER (OUTPATIENT)
Dept: RADIOLOGY | Facility: OTHER | Age: 58
Discharge: HOME | End: 2018-05-14
Attending: PHYSICIAN ASSISTANT
Payer: MEDICARE

## 2018-05-14 VITALS
WEIGHT: 268 LBS | HEIGHT: 67 IN | HEART RATE: 71 BPM | SYSTOLIC BLOOD PRESSURE: 124 MMHG | RESPIRATION RATE: 18 BRPM | DIASTOLIC BLOOD PRESSURE: 88 MMHG | BODY MASS INDEX: 42.06 KG/M2

## 2018-05-14 DIAGNOSIS — M79.641 HAND PAIN, RIGHT: Primary | ICD-10-CM

## 2018-05-14 DIAGNOSIS — M79.641 RIGHT HAND PAIN: ICD-10-CM

## 2018-05-14 DIAGNOSIS — M79.89 SWELLING OF RIGHT HAND: ICD-10-CM

## 2018-05-14 DIAGNOSIS — R26.81 GAIT INSTABILITY: ICD-10-CM

## 2018-05-14 DIAGNOSIS — M62.81 MUSCLE WEAKNESS: ICD-10-CM

## 2018-05-14 PROCEDURE — 99203 OFFICE O/P NEW LOW 30 MIN: CPT | Mod: S$GLB,,, | Performed by: PHYSICIAN ASSISTANT

## 2018-05-14 PROCEDURE — 73130 X-RAY EXAM OF HAND: CPT | Mod: 26,RT,, | Performed by: RADIOLOGY

## 2018-05-14 PROCEDURE — 3008F BODY MASS INDEX DOCD: CPT | Mod: CPTII,S$GLB,, | Performed by: PHYSICIAN ASSISTANT

## 2018-05-14 PROCEDURE — 73130 X-RAY EXAM OF HAND: CPT | Mod: TC,FY,RT

## 2018-05-14 PROCEDURE — G8979 MOBILITY GOAL STATUS: HCPCS | Mod: CK,PO

## 2018-05-14 PROCEDURE — 97110 THERAPEUTIC EXERCISES: CPT | Mod: PO

## 2018-05-14 PROCEDURE — 99999 PR PBB SHADOW E&M-EST. PATIENT-LVL IV: CPT | Mod: PBBFAC,,, | Performed by: PHYSICIAN ASSISTANT

## 2018-05-14 PROCEDURE — G8978 MOBILITY CURRENT STATUS: HCPCS | Mod: CK,PO

## 2018-05-14 PROCEDURE — 97162 PT EVAL MOD COMPLEX 30 MIN: CPT | Mod: PO

## 2018-05-14 NOTE — PROGRESS NOTES
Subjective:      Patient ID: Sisi Medel is a 57 y.o. female.    Chief Complaint: Pain of the Right Hand      HPI  Sisi Medel is a right hand dominant 57 y.o. female presenting today for right hand pain and swelling.  There was not a history of trauma.  Onset of symptoms began 2 months ago. She says that she noticed pain in the right hand and then saw that the hand was swollen.  She denies any left hand pain and swelling.  She has also noticed discoloration of the thumb, volar wrist, as well as the dorsal forearms.  She is on blood thinners.  She denies any finger numbness or tingling. She reports that the swelling and pain make it hard to use the right hand - she has difficulty opening bottles. She has trouble holding a glass, she is concerned about hand weakness and dropping items. She has pain using her walker.       She does admit to history of gout and diabetes.      Review of patient's allergies indicates:   Allergen Reactions    Bactrim [sulfamethoxazole-trimethoprim] Other (See Comments)     Causes renal failure    Nsaids (non-steroidal anti-inflammatory drug) Other (See Comments)     Renal failure         Current Outpatient Prescriptions   Medication Sig Dispense Refill    ACZONE 5 % topical gel Apply to face every morning 60 g 2    atorvastatin (LIPITOR) 80 MG tablet TAKE 1 TABLET(80 MG) BY MOUTH EVERY NIGHT 90 tablet 3    betamethasone dipropionate (DIPROLENE) 0.05 % ointment AAA bid hand under occlusion 45 g 3    blood sugar diagnostic Strp 1 strip by Misc.(Non-Drug; Combo Route) route 2 (two) times daily with meals. 100 strip 3    blood-glucose meter kit Use as instructed 1 each 0    cholecalciferol, vitamin D3, 1,000 unit capsule Take 1 capsule (1,000 Units total) by mouth once daily. (Patient taking differently: Take 1,000 Units by mouth every evening. )  0    clobetasol 0.05% (TEMOVATE) 0.05 % Oint AAA hand bid - under occlusion qhs 60 g 3    clopidogrel (PLAVIX) 75  mg tablet Take 1 tablet (75 mg total) by mouth once daily. 30 tablet 11    diclofenac sodium (SOLARAZE) 3 % gel       fludrocortisone (FLORINEF) 0.1 mg Tab Take 100 mcg by mouth 2 (two) times daily.      gabapentin (NEURONTIN) 600 MG tablet Take 1 tablet (600 mg total) by mouth 2 (two) times daily. 60 tablet 5    ketorolac 0.5% (ACULAR) 0.5 % Drop instill one drop into both eyes every morning  3    lancets Misc 1 lancet by Misc.(Non-Drug; Combo Route) route 2 (two) times daily with meals. 100 each 3    lancing device Misc 1 Device by Misc.(Non-Drug; Combo Route) route 2 (two) times daily with meals. 1 each 0    levETIRAcetam (KEPPRA) 500 MG Tab Take 1 tablet (500 mg total) by mouth 2 (two) times daily. 180 tablet 3    omeprazole (PRILOSEC) 20 MG capsule TAKE 1 CAPSULE BY MOUTH ONCE DAILY 30 capsule 4    polyethylene glycol (GLYCOLAX) 17 gram/dose powder Take 17 g by mouth once daily. Dissolve in juice. 510 g 0    prednisoLONE acetate (PRED FORTE) 1 % DrpS INSTILL ONE DROP INTO  LEFT EYE THREE TIMES A DAY  3    RENVELA 800 mg Tab TAKE 1 TABLET BY MOUTH THREE TIMES DAILY WITH MEALS 90 tablet 0    SENSIPAR 30 mg Tab daily with breakfast.       tiZANidine (ZANAFLEX) 4 MG tablet 1 tablet by mouth every 8-12 hours as needed for muscle spasm 90 tablet 0    warfarin (COUMADIN) 5 MG tablet Take 1 tablet (5 mg total) by mouth Daily. [facility] (Patient taking differently: Take 5 mg by mouth every evening. Per [facility]) 90 tablet 3    warfarin (COUMADIN) 5 MG tablet TAKE 1 TABLET BY MOUTH EVERYDAY EXCEPT 1 AND 1/2 TABLETS ON MONDAY AND FRIDAY OR AS DIRECTED BY CLINIC 40 tablet 0    midodrine (PROAMATINE) 5 MG Tab Take 2 tablets (10 mg total) by mouth every Mon, Wed, Fri, Sun. 32 tablet 11     No current facility-administered medications for this visit.        Past Medical History:   Diagnosis Date    Anemia in ESRD (end-stage renal disease) 3/7/2016    Anticoagulant long-term use     Cervical  "radiculopathy 2015    CHF (congestive heart failure)     Chronic combined systolic and diastolic heart failure 2013    EF 20% , improved with Medical therapy, negative PET     Chronic respiratory failure with hypoxia 2013    On home oxygen at 2-5 liters    Closed fracture of proximal end of right fibula 2016    Complications due to renal dialysis device, implant, and graft     DDD (degenerative disc disease), lumbar 2015    Dependence on renal dialysis     Diabetes mellitus type II, controlled 2017    ESRD on hemodialysis 2016    Essential hypertension     Gout     Lateral meniscal tear 2016    Lumbar stenosis 2015    Pacemaker     Paroxysmal atrial fibrillation 2014    Not on anticoagulation    Peripheral neuropathy 2013    Personal history of gastric ulcer 3/19/2013    Sarcoidosis, lung 2009    Diagnosed in  with ocular manifestation, on 4L home O2 and PO steroids    Secondary pulmonary hypertension 2015    Seizure disorder 3/19/2013    Seizures     Shingles 2013    Thyroid disease        Past Surgical History:   Procedure Laterality Date    ABDOMINAL SURGERY      CARDIAC PACEMAKER PLACEMENT       SECTION      x2    OTHER SURGICAL HISTORY      loop recorder implant    stent to fistula      VASCULAR SURGERY      fistula to L upper arm          Review of Systems:  Review of Systems   Constitution: Negative for chills and fever.   Skin: Positive for color change (see HPI). Negative for rash and suspicious lesions.   Musculoskeletal:        See HPI   Neurological: Positive for dizziness (orthostatic ). Negative for headaches, light-headedness, numbness and paresthesias.   Psychiatric/Behavioral: Negative for depression. The patient is not nervous/anxious.          OBJECTIVE:     PHYSICAL EXAM:  Height: 5' 7" (170.2 cm) Weight: 121.6 kg (268 lb)  Vitals:    18 1439   BP: 124/88   Pulse: 71   Resp: " "18   Weight: 121.6 kg (268 lb)   Height: 5' 7" (1.702 m)   PainSc:   5   PainLoc: Hand     General    Vitals reviewed.  Constitutional: She is oriented to person, place, and time. She appears well-developed and well-nourished.   Patient is on supplemental oxygen;  She uses a walker   HENT:   Head: Normocephalic and atraumatic.   Neck: Normal range of motion.   Cardiovascular: Normal rate.    Pulmonary/Chest: Effort normal. No respiratory distress.   Neurological: She is alert and oriented to person, place, and time.   Psychiatric: She has a normal mood and affect. Her behavior is normal. Judgment and thought content normal.             Musculoskeletal: Mild edema of the right hand.  Moderate edema with hyperpigmentation of the volar right wrist.  Hyperpigmentation also noted at the first webspace on the right and bilaterally over the dorsal aspect of the forearms-possible ecchymosis.  Decreased range of motion of the right fingers, patient reports pain with motion.  Chest reports pain with hyperflexion of the right wrist.  Neurovascular intact-decreased sensation in the right radial and median distributions compared to the left.  Good motor function.  Good capillary refill and 2+ radial pulses.  Negative Tinel's and Blanche's bilaterally.    RADIOGRAPHS:  Right Hand X-Ray, 5/14/18  FINDINGS:  Bones are intact.  There is no evidence for acute fracture or bone destruction. There are mild degenerative changes within the small joints of the hand, most pronounced at the interphalangeal joint of the right thumb.  No bony erosions are identified.  There is no evidence for dislocation.  Soft tissues appear unremarkable.   Impression   Degenerative changes, most pronounced at the right 1st interphalangeal joint.  No evidence for acute fracture, bone destruction, or dislocation.     Comments: I have personally reviewed the imaging and I agree with the above radiologist's report.    ASSESSMENT/PLAN:   Sisi was seen today for " pain.    Diagnoses and all orders for this visit:    Hand pain, right  -     URIC ACID; Future  -     Sedimentation rate, manual; Future  -     C-reactive protein; Future    Swelling of right hand  -     URIC ACID; Future  -     Sedimentation rate, manual; Future  -     C-reactive protein; Future           - We talked at length about the anatomy and pathophysiology of   Encounter Diagnoses   Name Primary?    Hand pain, right Yes    Swelling of right hand        - Labs ordered and scheduled  - Discussed possibility of nerve study in the future, pending lab results  - Patient be called with lab results  - Plan 1 tablets of 0.6mg colchicine. Si.6 mg (1 tablets) at the first sign of flare (discussed with PCP); Follow up with PCP      Disclaimer: This note has been generated using voice-recognition software. There may be typographical errors that have been missed during proof-reading.

## 2018-05-14 NOTE — LETTER
May 16, 2018      Carlos A Caputo MD  [address]  [facility] [address]           [facility]  [address], [address]  [facility] [address]  Phone: [phone]          Patient: Sisi Medel   MR Number: [id number]   YOB: 1960   Date of Visit: 5/14/2018       Dear Dr. Carlos A Caputo:    Thank you for referring Sisi Medel to me for evaluation. Attached you will find relevant portions of my assessment and plan of care.    If you have questions, please do not hesitate to call me. I look forward to following Sisi Medel along with you.    Sincerely,    KIET Mane    Enclosure  CC:  No Recipients    If you would like to receive this communication electronically, please contact [email or web address] or [phone] to request more information on [employer] access.    For providers and/or their staff who would like to refer a patient to [facility], please contact us through our one-stop-shop provider referral line, [facility] , at [phone].    If you feel you have received this communication in error or would no longer like to receive these types of communications, please e-mail [email or web address]

## 2018-05-14 NOTE — PATIENT INSTRUCTIONS
Seated Marching    Sit, both feet flat. Lift right knee toward ceiling. Lower and lift left knee toward the ceiling. Repeat, alternating sides.  Complete 2 sets of 10 repetitions. Perform 2 sessions per day.      Knee Extension (Sitting)    Straighten knee fully, lower slowly. Repeat on other side.  Repeat 10 times per set. Do 2 sets per session. Do 2 sessions per day.      Heel Raise (Sitting)    Raise heels, keeping toes on floor. Then lower heels and raise toes off floor. Repeat.  Repeat 10 times per set. Do 2 sets per session. Do 2 sessions per day.    Slow Stand to Sit    Stand up, using hands if needed. Keep chest and head upright, bend your knees, don't use hands, and slowly lower back to the chair. Move as slowly as you can.  Repeat 10 times per set. Do 1 sets per session. Do 2 sessions per day.    Copyright © [employer]. All rights reserved.      Glute Squeeze    Lie face up or down and squeeze your rear end. Do not tighten your abdominals or hold your breath. Hold 5 seconds. Relax.  Repeat 10 times per set. Do 2 sets per session. Do 2 sessions per day.

## 2018-05-15 ENCOUNTER — TELEPHONE (OUTPATIENT)
Dept: ORTHOPEDICS | Facility: CLINIC | Age: 58
End: 2018-05-15

## 2018-05-15 NOTE — TELEPHONE ENCOUNTER
Phone call to patient - informed of Stephanie's message. Verbalized understanding. Will also try to contact Dr. Caputo's office.

## 2018-05-15 NOTE — TELEPHONE ENCOUNTER
----- Message from KIET Mane sent at 5/15/2018  2:50 PM CDT -----  Will you let her know that the labs were positive for elevated uric acid (gout) - I'm waiting to hear back from her PCP about how they want us to treat this due to her chronic kidney disease.

## 2018-05-16 ENCOUNTER — TELEPHONE (OUTPATIENT)
Dept: INTERNAL MEDICINE | Facility: CLINIC | Age: 58
End: 2018-05-16

## 2018-05-16 RX ORDER — COLCHICINE 0.6 MG/1
0.6 TABLET ORAL ONCE
Qty: 1 TABLET | Refills: 0 | Status: SHIPPED | OUTPATIENT
Start: 2018-05-16 | End: 2018-01-01

## 2018-05-16 NOTE — TELEPHONE ENCOUNTER
Reviewed. Uric acid elevated but not extremely high; gout vs inflammatory osteoarthritis?   Can try colchicine at dialysis-appropriate doses (0.6mg as single dose, no more frequently than every 2 weeks). If concerned for acute gout flare would not start allopurinol until this resolves. If starting allopurinol (after flare resolves) then initial dose no more than 100mg every other day, given post-dialysis.  Would avoid NSAIDs unless given explicit clearance on this from her nephrologist.   She is already on chronic steroids; could potentially do short steroid taper at higher dose.     Colchicine dosing with Dialysis: 0.6 mg as a single dose; treatment course should not be repeated more frequently than every 14 days. Not removed by dialysis.

## 2018-05-16 NOTE — PLAN OF CARE
OUTPATIENT PHYSICAL THERAPY  PHYSICAL THERAPY EVALUATION    Name: Sisi Medel  Clinic Number: [id number]    Evaluation Date: 05/14/2018  Visit #: 1 / 8  Authorization period Expiration: 07/14/2018  Plan of Care Expiration: 07/14/2018  Precautions: Standard, O2 dependent     Diagnosis:   Encounter Diagnoses   Name Primary?    Muscle weakness     Gait instability      Physician: Carlos A Caputo MD  Treatment Orders: PT Eval and Treat  Past Medical History:   Diagnosis Date    Anemia in ESRD (end-stage renal disease) 3/7/2016    Anticoagulant long-term use     Cervical radiculopathy 7/20/2015    CHF (congestive heart failure)     Chronic combined systolic and diastolic heart failure 6/14/2013    EF 20% 2013, improved with Medical therapy, negative PET 2013    Chronic respiratory failure with hypoxia 04/22/2013    On home oxygen at 2-5 liters    Closed fracture of proximal end of right fibula 6/27/2016    Complications due to renal dialysis device, implant, and graft     DDD (degenerative disc disease), lumbar 6/17/2015    Dependence on renal dialysis     Diabetes mellitus type II, controlled 12/8/2017    ESRD on hemodialysis 2/23/2016    Essential hypertension     Gout     Lateral meniscal tear 5/31/2016    Lumbar stenosis 6/17/2015    Pacemaker     Paroxysmal atrial fibrillation 5/16/2014    Not on anticoagulation    Peripheral neuropathy 11/13/2013    Personal history of gastric ulcer 3/19/2013    Sarcoidosis, lung 2009    Diagnosed in 2009 with ocular manifestation, on 4L home O2 and PO steroids    Secondary pulmonary hypertension 4/7/2015    Seizure disorder 3/19/2013    Seizures     Shingles 11/13/2013    Thyroid disease      Current Outpatient Prescriptions   Medication Sig    ACZONE 5 % topical gel Apply to face every morning    atorvastatin (LIPITOR) 80 MG tablet TAKE 1 TABLET(80 MG) BY MOUTH EVERY NIGHT    betamethasone dipropionate (DIPROLENE) 0.05 % ointment AAA  bid hand under occlusion    blood sugar diagnostic Strp 1 strip by Misc.(Non-Drug; Combo Route) route 2 (two) times daily with meals.    blood-glucose meter kit Use as instructed    cholecalciferol, vitamin D3, 1,000 unit capsule Take 1 capsule (1,000 Units total) by mouth once daily. (Patient taking differently: Take 1,000 Units by mouth every evening. )    clobetasol 0.05% (TEMOVATE) 0.05 % Oint AAA hand bid - under occlusion qhs    clopidogrel (PLAVIX) 75 mg tablet Take 1 tablet (75 mg total) by mouth once daily.    diclofenac sodium (SOLARAZE) 3 % gel     fludrocortisone (FLORINEF) 0.1 mg Tab Take 100 mcg by mouth 2 (two) times daily.    gabapentin (NEURONTIN) 600 MG tablet Take 1 tablet (600 mg total) by mouth 2 (two) times daily.    ketorolac 0.5% (ACULAR) 0.5 % Drop instill one drop into both eyes every morning    lancets Misc 1 lancet by Misc.(Non-Drug; Combo Route) route 2 (two) times daily with meals.    lancing device Misc 1 Device by Misc.(Non-Drug; Combo Route) route 2 (two) times daily with meals.    levETIRAcetam (KEPPRA) 500 MG Tab Take 1 tablet (500 mg total) by mouth 2 (two) times daily.    midodrine (PROAMATINE) 5 MG Tab Take 2 tablets (10 mg total) by mouth every Mon, Wed, Fri, Sun.    omeprazole (PRILOSEC) 20 MG capsule TAKE 1 CAPSULE BY MOUTH ONCE DAILY    polyethylene glycol (GLYCOLAX) 17 gram/dose powder Take 17 g by mouth once daily. Dissolve in juice.    prednisoLONE acetate (PRED FORTE) 1 % DrpS INSTILL ONE DROP INTO  LEFT EYE THREE TIMES A DAY    RENVELA 800 mg Tab TAKE 1 TABLET BY MOUTH THREE TIMES DAILY WITH MEALS    SENSIPAR 30 mg Tab daily with breakfast.     tiZANidine (ZANAFLEX) 4 MG tablet 1 tablet by mouth every 8-12 hours as needed for muscle spasm    warfarin (COUMADIN) 5 MG tablet Take 1 tablet (5 mg total) by mouth Daily. Per [facility] (Patient taking differently: Take 5 mg by mouth every evening. Per [facility])    warfarin (COUMADIN) 5 MG  "tablet TAKE 1 TABLET BY MOUTH EVERYDAY EXCEPT 1 AND 1/2 TABLETS ON MONDAY AND FRIDAY OR AS DIRECTED BY CLINIC     No current facility-administered medications for this visit.      Review of patient's allergies indicates:   Allergen Reactions    Bactrim [sulfamethoxazole-trimethoprim] Other (See Comments)     Causes renal failure    Nsaids (non-steroidal anti-inflammatory drug) Other (See Comments)     Renal failure       History   Prior Therapy: Prior therapy for same condition   Social History: Lives in apartment; [address]; lives with fiance   Previous functional status: Prior to incident, pt independent in all ADLs and physical activity    Current functional status: Uses shower chair, requires increased time to complete ADLs   Work: Not currently working     Subjective   History of Present Illness: Pt presents to [facility] with complaints of difficulty walking and transferring from low surfaces. Pt reports she has fallen 3 times this year due to instability. Pt additionally reports she is unable to stand from low surfaces without assistance or UE support. Pt currently ambulating with SBQC and boot on RLE to protect wound on great toe. Pt reports she has had feelings of instability for approximately 1 year, with conditions worsening. Pt uses rollator walker when ambulating long distances. Pt currently utilizes 3L of O2 at all times due to sarcoidosis of the lungs. Pt reports 6 year history of dependent O2 use.       DOI: ~ 1 year   Imaging, bone scan films: 03/19/2018 "Left knee: There is a healing fracture of the proximal fibula.  There is mild DJD."  Pain: current 0/10, worst 8/10, best 0/10, Throbbing, intermittent  Radicular symptoms: neuropathy of BLE   Aggravating factors: No specific aggravating factors   Easing factors: Rest   Pts goals: Improve walking     Objective   Mental status: alert, interactive, oriented x3  Posture/ Alignment: Protruded Head, Protracted Scapula    Vitals:   O2: 96%   HR: 70 " bpm   BP: 118/96 mmHg     Movement Analysis: Pt requires additional time to complete transfers, bed mobility, and gait     GAIT DEVIATIONS: Sisi amb with decreased merly, decreased step length, decreased stride length and decreased weight-shifting ability.Pt ambulates with SBQC and deviations in gait path.     ROM: Gross AROM within functional limits across all major UE/LE joints     Strength:    Right Left Comment   Shoulder flexion: 4+/5 4+/5    Shoulder Abduction: 4+/5 4+/5    Elbow Flexion: 4+/5 5/5    Elbow Extension: 4+/5 5/5     5/5 5/5         Right Left Comment   Hip Flexion: 3+/5 3+/5    Knee Extension: 5/5 5/5    Knee Flexion: 4+/5 4+/5    Ankle DF: 5/5 NT        Special Tests:  TU seconds with SBQC    Assistive Device  Normally Used: Yes. Type of Assistive Device: SBQC  Assistive Device During Testing: Yes     BALANCE ASSESSMENT  1. Sitting Balance: 1 - steady, safe  2. Rises from Chair: 0 - unable to rise without help  3. Attempts to Rise: 1 - able to rise, requires more than one attempt  4. Immediate Standing Balance: 1 - steady but uses walker or other support  5. Standing Balance: 1 - steady but with wide stance and uses support  6. Nudged: 1 - staggers, grabs, catches self  7. Eyes Closed: 0 - unsteady  8. Turning 360 Degrees: 0 - discontinuous steps and 1 - steady  9.Sitting Down: 1 - uses arms or not as smooth motion     Balance Score:     GAIT ASSESSMENT  10. Indication of Gait: 1 - no hesitancy  11. Step Length and Height: 1 - step through right and 1 - step through left  12. Foot Clearance: 1 - right foot clears the floor and 1 - left foot clears the floor  13. Step Symmetry: 1 - right and left step length appear equal  14. Step Continuity: 1 - steps appear continuous  15. Path: 0 - marked deviation  16. Trunk: 1 - no sway, flexes knees/back/uses arms to balance  17. Walking Time: 0 - heels apart     Gait Score:    TOTAL SCORE: 15 /28     24+ = low fall risk   19-23 =  "moderate fall risk  <19 = high fall risk        Neurovascular:  - Sensation: Impaired to light touch over RLE in L5 dermatomal region       Palpation:  Pt with increased swelling to R hand with no signs of warmth of erythema.     Joint Play:  Not assessed      Pt/family was provided educational information, including: PT evaluation findings, role of PT, goals for PT, scheduling - pt verbalized understanding. Discussed insurance plan with pt.     TREATMENT   Time In: 8:57 AM  Time Out: 10:00 AM    Discussed Plan of Care with patient: Yes    Sisi received 10 minutes of therapeutic exercises including:   Seated Marching 10 x 3" hold   LAQ 10 x 3" hold   Glut sets 10 x 5" hold   Seated heel raises x 20   Sit<>stand x 5      Written Home Exercises Provided: See Patient Instructions   Exercises were reviewed and Sisi was able to demonstrate them prior to the end of the session. Pt received a written copy of exercises to perform at home. Sisi demonstrated good  understanding of the education provided.     Assessment   Sisi is a 57 y.o. female referred to outpatient physical therapy with a medical diagnosis of impaired functional mobility, balance, gait and endurance.Pt demonstrates impairments including limitations as described in the problem list. Pt is a good candidate for skilled therapy services at this time to improve functional strength and mobility, as well as normalize balance and gait; so she may return to PLOF. Pt prognosis is Fair. Positive prognostic factors include motivation for therapy services. Negative prognostic factors include multiple co-morbidities, severity of symptoms. Pt will benefit from skilled outpatient physical therapy to address the above stated deficits, provide pt/family education, and to maximize pt's level of independence.     History  Co-morbidities and personal factors that may impact the plan of care Examination  Body Structures and Functions, activity limitations and " participation restrictions that may impact the plan of care    Clinical Presentation   Co-morbidities:   COPD/asthma and high BMI        Personal Factors:   no deficits Body Regions:   lower extremities  upper extremities    Body Systems:   strength  balance  gait        Participation Restrictions:   Pt limited in walking distances, and is unable to transfer from low surfaces independently     Activity limitations:   Learning and applying knowledge  no deficits    General Tasks and Commands  no deficits    Communication  no deficits    Mobility  no deficits    Self care  no deficits    Domestic Life  no deficits    Interactions/Relationships  no deficits    Life Areas  no deficits    Community and Social Life  no deficits         stable and uncomplicated                      moderate   moderate  moderate Decision Making/ Complexity Score:  moderate     G Codes:   Current: CK at least 40% < 60% impaired, limited or restricted  Goal: CK at least 40% < 60% impaired, limited or restricted    Pt's spiritual, cultural and educational needs considered and pt agreeable to plan of care and goals as stated below:     Anticipated Barriers for physical therapy: none     Short Term GOALS:  In 4 weeks, pt. will:  Pt will perform sit to stand transfer from standard height chair with no use of UEs and < or = min A in order to be independent with transfers.  Pt will increase tinetti socre to 18/28 to improve balance.  Pt will navigate 4 steps with < or = min A with 2 HR in order to improve functional mobility.  Pt will improve B hip flexion strength by 1 grade in order to improve stability with functional tasks.  Pt to tolerate HEP to improve ROM and independence with ADL's    Long term goals  In 8 weeks, pt will:  Pt will be able to perform 5 consecutive sit to stand transfers s/ UE use on first attempts from std ht chair in order to improve transfer skills  Pt will improve Tinetti score to > or = 22/28 in order to improve  functional mobility  Pt will navigate 7 steps with < or = min A with 2 HR in order to tolerance to ambulation.  Pt will improve B hip flexion and abduction strength to 4+/5 MMT grade in order to improve stability with upright tasks.  Pt demonstrate independence in HEP and POC to improve ROM and independence with ADL's.      Plan   Outpatient physical therapy 1 - 2 times weekly to include: pt ed, HEP, therapeutic exercises, therapeutic activities, neuromuscular re-education/ balance exercises, manual therapy, dry needling, and modalities prn. Cont PT for 6 - 8 weeks. Pt may be seen by PTA as part of the rehabilitation team.     I certify the need for these services furnished under this plan of treatment and while under my care.    Jhoana Villarreal, PT

## 2018-05-16 NOTE — TELEPHONE ENCOUNTER
----- Message from Marely Leong sent at 5/16/2018  3:30 PM CDT -----  Contact: self/[phone]      ----- Message -----  From: Lynn Britton  Sent: 5/16/2018   2:54 PM  To: Patito Strauss Staff    Pt is calling to speak with someone in the office in regards to her hand for the swelling. She states that she went to go get it check and the conclusion that it is gout in her hands. She states she needs to know what medications she needs to be on for it. Please advise.    Thanks

## 2018-05-16 NOTE — TELEPHONE ENCOUNTER
Evie Roblero LPN          5/15/18 4:34 PM   Note      ----- Message from KIET Mane sent at 5/15/2018  2:50 PM CDT -----  Will you let her know that the labs were positive for elevated uric acid (gout) - I'm waiting to hear back from her PCP about how they want us to treat this due to her chronic kidney disease.

## 2018-05-16 NOTE — TELEPHONE ENCOUNTER
See orthop diagnosis of gout. They want Dr Caputo to make the call on treatment due to pt's kidney disease.

## 2018-05-17 ENCOUNTER — TELEPHONE (OUTPATIENT)
Dept: ORTHOPEDICS | Facility: CLINIC | Age: 58
End: 2018-05-17

## 2018-05-17 ENCOUNTER — TELEPHONE (OUTPATIENT)
Dept: VASCULAR SURGERY | Facility: CLINIC | Age: 58
End: 2018-05-17

## 2018-05-17 ENCOUNTER — OFFICE VISIT (OUTPATIENT)
Dept: PODIATRY | Facility: CLINIC | Age: 58
End: 2018-05-17
Payer: MEDICARE

## 2018-05-17 ENCOUNTER — TELEPHONE (OUTPATIENT)
Dept: PODIATRY | Facility: CLINIC | Age: 58
End: 2018-05-17

## 2018-05-17 VITALS
HEART RATE: 75 BPM | WEIGHT: 268 LBS | HEIGHT: 67 IN | DIASTOLIC BLOOD PRESSURE: 79 MMHG | BODY MASS INDEX: 42.06 KG/M2 | SYSTOLIC BLOOD PRESSURE: 106 MMHG

## 2018-05-17 DIAGNOSIS — R60.0 BILATERAL LOWER EXTREMITY EDEMA: ICD-10-CM

## 2018-05-17 DIAGNOSIS — L97.512 TOE ULCER, RIGHT, WITH FAT LAYER EXPOSED: ICD-10-CM

## 2018-05-17 DIAGNOSIS — G60.9 IDIOPATHIC PERIPHERAL NEUROPATHY: Primary | ICD-10-CM

## 2018-05-17 DIAGNOSIS — Z99.2 ESRD (END STAGE RENAL DISEASE) ON DIALYSIS: Primary | ICD-10-CM

## 2018-05-17 DIAGNOSIS — N18.6 ESRD (END STAGE RENAL DISEASE) ON DIALYSIS: Primary | ICD-10-CM

## 2018-05-17 PROCEDURE — 99499 UNLISTED E&M SERVICE: CPT | Mod: S$GLB,,, | Performed by: PODIATRIST

## 2018-05-17 PROCEDURE — 99999 PR PBB SHADOW E&M-EST. PATIENT-LVL III: CPT | Mod: PBBFAC,,, | Performed by: PODIATRIST

## 2018-05-17 RX ORDER — MUPIROCIN 20 MG/G
OINTMENT TOPICAL
Status: CANCELLED | OUTPATIENT
Start: 2018-05-17

## 2018-05-17 RX ORDER — LIDOCAINE HYDROCHLORIDE 10 MG/ML
1 INJECTION, SOLUTION EPIDURAL; INFILTRATION; INTRACAUDAL; PERINEURAL ONCE
Status: CANCELLED | OUTPATIENT
Start: 2018-05-17 | End: 2018-05-17

## 2018-05-17 NOTE — TELEPHONE ENCOUNTER
Phone call to patient - informed of below. Will  today. Scheduled follow up for 6/4. Can only come M/W/F. Stephanie was booked on 5/28, pt unable to come on 5/30, Stephanie not in on Fridays. Scheduled for 6/4. Patient agrees with plan and will call if has any issues between now and then.

## 2018-05-17 NOTE — TELEPHONE ENCOUNTER
----- Message from KIET Mane sent at 5/16/2018  4:46 PM CDT -----  Regarding: Rx  Please let her know that a single tablet of colchicine was sent to her pharmacy - she should take it for the gout flare.  It was discussed with her PCP to ensure proper dosing.  Thanks!

## 2018-05-17 NOTE — TELEPHONE ENCOUNTER
Spoke with Sandrita from [facility] and Ms Medel re need for HH.  They will arrange for [facility] to do dsg changes as ordered.

## 2018-05-17 NOTE — TELEPHONE ENCOUNTER
----- Message from Ashely Villagomez sent at 5/17/2018  2:03 PM CDT -----  Contact: Workable/[phone]  [facility] would like a call back for the patient home health information orders.

## 2018-05-18 ENCOUNTER — ANTI-COAG VISIT (OUTPATIENT)
Dept: CARDIOLOGY | Facility: CLINIC | Age: 58
End: 2018-05-18
Payer: MEDICARE

## 2018-05-18 ENCOUNTER — OFFICE VISIT (OUTPATIENT)
Dept: DERMATOLOGY | Facility: CLINIC | Age: 58
End: 2018-05-18
Payer: MEDICARE

## 2018-05-18 ENCOUNTER — TELEPHONE (OUTPATIENT)
Dept: PODIATRY | Facility: CLINIC | Age: 58
End: 2018-05-18

## 2018-05-18 ENCOUNTER — HOSPITAL ENCOUNTER (OUTPATIENT)
Facility: HOSPITAL | Age: 58
Discharge: HOME | End: 2018-05-18
Attending: SURGERY | Admitting: SURGERY
Payer: MEDICARE

## 2018-05-18 VITALS
TEMPERATURE: 97 F | WEIGHT: 268 LBS | HEART RATE: 70 BPM | DIASTOLIC BLOOD PRESSURE: 95 MMHG | RESPIRATION RATE: 20 BRPM | SYSTOLIC BLOOD PRESSURE: 139 MMHG | HEIGHT: 68 IN | OXYGEN SATURATION: 99 % | BODY MASS INDEX: 40.62 KG/M2

## 2018-05-18 DIAGNOSIS — D48.5 NEOPLASM OF UNCERTAIN BEHAVIOR OF SKIN: ICD-10-CM

## 2018-05-18 DIAGNOSIS — Z99.2 ESRD (END STAGE RENAL DISEASE) ON DIALYSIS: ICD-10-CM

## 2018-05-18 DIAGNOSIS — Z79.01 CURRENT USE OF LONG TERM ANTICOAGULATION: Primary | ICD-10-CM

## 2018-05-18 DIAGNOSIS — N18.6 ESRD (END STAGE RENAL DISEASE) ON DIALYSIS: ICD-10-CM

## 2018-05-18 DIAGNOSIS — L98.491 CALLOUS ULCER, LIMITED TO BREAKDOWN OF SKIN: Primary | ICD-10-CM

## 2018-05-18 LAB
INR PPP: 1.9 (ref 2–3)
PERIPHERAL PTA: YES

## 2018-05-18 PROCEDURE — 25000003 PHARM REV CODE 250

## 2018-05-18 PROCEDURE — 99213 OFFICE O/P EST LOW 20 MIN: CPT | Mod: S$GLB,,, | Performed by: DERMATOLOGY

## 2018-05-18 PROCEDURE — 11042 DBRDMT SUBQ TIS 1ST 20SQCM/<: CPT | Mod: S$GLB,,, | Performed by: PODIATRIST

## 2018-05-18 PROCEDURE — C1894 INTRO/SHEATH, NON-LASER: HCPCS

## 2018-05-18 PROCEDURE — 85610 PROTHROMBIN TIME: CPT | Mod: QW,S$GLB,, | Performed by: INTERNAL MEDICINE

## 2018-05-18 PROCEDURE — 63600175 PHARM REV CODE 636 W HCPCS

## 2018-05-18 PROCEDURE — 36902 INTRO CATH DIALYSIS CIRCUIT: CPT | Mod: ,,, | Performed by: SURGERY

## 2018-05-18 PROCEDURE — 99999 PR PBB SHADOW E&M-EST. PATIENT-LVL II: CPT | Mod: PBBFAC,,, | Performed by: DERMATOLOGY

## 2018-05-18 PROCEDURE — 99152 MOD SED SAME PHYS/QHP 5/>YRS: CPT | Mod: ,,, | Performed by: SURGERY

## 2018-05-18 RX ORDER — CLINDAMYCIN PHOSPHATE 10 UG/ML
LOTION TOPICAL
Qty: 60 ML | Refills: 3 | Status: ON HOLD | OUTPATIENT
Start: 2018-05-18 | End: 2018-08-22

## 2018-05-18 RX ORDER — DOXYCYCLINE HYCLATE 100 MG
TABLET ORAL
Status: ON HOLD | COMMUNITY
Start: 2018-04-20 | End: 2018-05-18 | Stop reason: HOSPADM

## 2018-05-18 RX ORDER — ONDANSETRON 8 MG/1
8 TABLET, ORALLY DISINTEGRATING ORAL EVERY 8 HOURS PRN
Status: DISCONTINUED | OUTPATIENT
Start: 2018-05-18 | End: 2018-05-18 | Stop reason: HOSPADM

## 2018-05-18 RX ORDER — ACETAMINOPHEN 325 MG/1
650 TABLET ORAL EVERY 4 HOURS PRN
Status: DISCONTINUED | OUTPATIENT
Start: 2018-05-18 | End: 2018-05-18 | Stop reason: HOSPADM

## 2018-05-18 RX ORDER — CEFAZOLIN SODIUM 1 G/3ML
2 INJECTION, POWDER, FOR SOLUTION INTRAMUSCULAR; INTRAVENOUS
Status: DISCONTINUED | OUTPATIENT
Start: 2018-05-18 | End: 2018-05-18 | Stop reason: HOSPADM

## 2018-05-18 RX ORDER — FOLIC ACID/VIT B COMPLEX AND C 0.8 MG
TABLET ORAL
Refills: 0 | Status: ON HOLD | COMMUNITY
Start: 2018-04-23 | End: 2019-01-01 | Stop reason: HOSPADM

## 2018-05-18 RX ORDER — PREDNISONE 10 MG/1
TABLET ORAL
Refills: 2 | COMMUNITY
Start: 2018-05-04 | End: 2018-01-01 | Stop reason: SDUPTHER

## 2018-05-18 RX ORDER — PNEUMOCOCCAL 13-VALENT CONJUGATE VACCINE 2.2; 2.2; 2.2; 2.2; 2.2; 4.4; 2.2; 2.2; 2.2; 2.2; 2.2; 2.2; 2.2 UG/.5ML; UG/.5ML; UG/.5ML; UG/.5ML; UG/.5ML; UG/.5ML; UG/.5ML; UG/.5ML; UG/.5ML; UG/.5ML; UG/.5ML; UG/.5ML; UG/.5ML
INJECTION, SUSPENSION INTRAMUSCULAR
Refills: 0 | Status: ON HOLD | COMMUNITY
Start: 2018-05-02 | End: 2019-01-01 | Stop reason: HOSPADM

## 2018-05-18 RX ORDER — LIDOCAINE HYDROCHLORIDE 10 MG/ML
1 INJECTION, SOLUTION EPIDURAL; INFILTRATION; INTRACAUDAL; PERINEURAL ONCE
Status: DISCONTINUED | OUTPATIENT
Start: 2018-05-18 | End: 2018-05-18 | Stop reason: HOSPADM

## 2018-05-18 RX ORDER — MUPIROCIN 20 MG/G
OINTMENT TOPICAL
Status: DISCONTINUED | OUTPATIENT
Start: 2018-05-18 | End: 2018-05-18 | Stop reason: HOSPADM

## 2018-05-18 RX ORDER — HYDROCODONE BITARTRATE AND ACETAMINOPHEN 5; 325 MG/1; MG/1
1 TABLET ORAL EVERY 4 HOURS PRN
Status: DISCONTINUED | OUTPATIENT
Start: 2018-05-18 | End: 2018-05-18 | Stop reason: HOSPADM

## 2018-05-18 RX ORDER — BETAMETHASONE DIPROPIONATE 0.5 MG/G
OINTMENT TOPICAL
Qty: 45 G | Refills: 3 | Status: SHIPPED | OUTPATIENT
Start: 2018-05-18 | End: 2019-01-01 | Stop reason: SDUPTHER

## 2018-05-18 NOTE — H&P (VIEW-ONLY)
Sisi Medel  04/02/2018    HPI:  Patient is a 55 y.o.  female with a h/o HTN, HLD, CHF (EF 20%), COPD on home O2, paroxysmal Afib (on Coumadin), seizure disorder, ESRD on HD TThS via LUE AVG (placed 2/3/16) who presents to clinic today for follow up. She was last seen in our clinic on 1/26/18. She underwent LUE fistulagram and PTA arterial inflow (7 x 40 [place]). She has done well since her last visit, however, still continuing to have elevated venous pressure on the HD circuit. She is able to reliably complete each HD session.  She is feeling well with no complaints today. Ultrasound shows no significant areas of concern.    S/p   2/3/16: LUE AVG creation (Dr Villafana)  6/21/16: Percutaneous mechanical thrombectomy w [place] Angiojet AVX; 4fr OTW embolectomy of arterial inflow   PTA outflow stenosis with a 7x40 mm balloon  10/12/16: fistulogram (75% stenosis noted), left upper extremity AV graft PTA venous outflow with resolution of stenosis noted  2/2017 Declot and venous outflow PTA and stent with 8 x 50 VIABAHN  5/15/2017: PTA outflow stenosis (8x60 [place]); Stent outflow stenosis (8x50 [place])  10/12/17: PTA LUE AV graft (brachial vein) x2: (7x60 [place]); (8x40 [place])   01/10/18: PTA outflow stensois (8x40 [place]) and (9x40 [place])   2/2/18: PTA arterial inflow (7 x 40 [place])    No MI/stroke  Tobacco use: Former smoker     Past Medical History   Diagnosis Date    Anemia in ESRD (end-stage renal disease) 3/7/2016    Cervical radiculopathy 7/20/2015    Chronic combined systolic and diastolic heart failure 6/14/2013     EF 20% 2013, improved with Medical therapy, negative PET 2013    Chronic respiratory failure with hypoxia 04/22/2013     On home oxygen at 2-5 liters    Chronic rhinitis 10/2/2013    DDD (degenerative disc disease), lumbar 6/17/2015    ESRD on hemodialysis 2/23/2016    Essential hypertension     Gout     Hyperlipidemia 3/7/2016    Lateral meniscal tear  2016    Lumbar stenosis 2015    Morbid obesity 8/15/2013    Paroxysmal atrial fibrillation 2014     Not on anticoagulation    Peripheral neuropathy 2013    Personal history of fall 2014    Personal history of gastric ulcer 3/19/2013    Sarcoidosis, lung 2009     Diagnosed in  with ocular manifestation, on 4L home O2 and PO steroids    Secondary pulmonary hypertension 2015    Seizure disorder 3/19/2013    Shingles 2013    Spondylarthrosis 2015     Past Surgical History   Procedure Laterality Date     section, classic      Abdominal surgery      Vascular surgery       Family History   Problem Relation Age of Onset    Kidney failure Mother     Hypertension Mother     Diabetes Mother     Coronary artery disease Father     Hypertension Father     Diabetes Father     Lupus Sister     Diabetes Maternal Grandmother     Asthma Neg Hx     Emphysema Neg Hx     Melanoma Neg Hx     Psoriasis Neg Hx      Social History     Social History    Marital status: Single     Spouse name: N/A    Number of children: N/A    Years of education: N/A     Occupational History    Not on file.     Social History Main Topics    Smoking status: Former Smoker     Packs/day: 0.50     Years: 10.00     Types: Cigarettes     Quit date: 1994    Smokeless tobacco: Never Used    Alcohol use No    Drug use: No    Sexual activity: No     Other Topics Concern    Not on file     Social History Narrative    Lives with boyfriend/partner who helps with her care.     Plans to travel to [place] for 2 weeks in 2016     Current Outpatient Prescriptions on File Prior to Visit   Medication Sig    ACZONE 5 % topical gel Apply topically once daily.     allopurinol (ZYLOPRIM) 100 MG tablet Take 1 tablet (100 mg total) by mouth once daily.    amiodarone (PACERONE) 200 MG Tab Take 1 tablet (200 mg total) by mouth every morning.    ammonium lactate (LAC-HYDRIN) 12 % lotion Apply  topically as needed (for scars on arms).     aspirin 81 MG Chew Take 81 mg by mouth every morning.    atorvastatin (LIPITOR) 80 MG tablet Take 80 mg by mouth once daily.     capsicum 0.075% (ZOSTRIX) 0.075 % topical cream Apply topically 3 (three) times daily. For neuropathic pain of feet    gabapentin (NEURONTIN) 100 MG capsule TAKE ONE CAPSULE BY MOUTH THREE TIMES DAILY (Patient taking differently: TAKE ONE CAPSULE BY MOUTH THREE TIMES DAILY-noon, evening, bedtime)    levetiracetam (KEPPRA) 500 MG Tab TAKE 1 TABLET BY MOUTH TWICE DAILY.    melatonin 5 mg Tab Take 1 tablet by mouth nightly.    midodrine (PROAMATINE) 10 MG tablet     midodrine (PROAMATINE) 5 MG Tab     NEPHRO-LINDA 0.8 mg Tab TK 1 T PO  QD    ondansetron (ZOFRAN-ODT) 8 MG TbDL Take 1 tablet (8 mg total) by mouth every 8 (eight) hours as needed.    oxycodone-acetaminophen (PERCOCET) 7.5-325 mg per tablet Take 1 tablet by mouth every 4 (four) hours as needed.    predniSONE (DELTASONE) 10 MG tablet TAKE 3 TABLETS BY MOUTH FOR 7 DAYS, THEN 2 TABLETS FOR 7 DAYS, THEN 1 TABLET EVERY DAY AFTER.    RENVELA 800 mg Tab TAKE 1 TABLET BY MOUTH THREE TIMES DAILY WITH MEALS    tizanidine 4 mg Cap Take 4 mg by mouth 2 (two) times daily as needed. (Patient taking differently: Take 4 mg by mouth daily as needed (for muscle spasms.). )    warfarin (COUMADIN) 5 MG tablet TAKE 1 TABLET(5 MG) BY MOUTH DAILY     No current facility-administered medications on file prior to visit.        REVIEW OF SYSTEMS:  General: negative; ENT: negative; Allergy and Immunology: negative; Hematological and Lymphatic: Negative; Endocrine: negative; Respiratory: no cough, shortness of breath, or wheezing; Cardiovascular: no chest pain or dyspnea on exertion; Gastrointestinal: no abdominal pain/back, change in bowel habits, or bloody stools; Genito-Urinary: no dysuria, trouble voiding, or hematuria; Musculoskeletal: no pain, numbness, to LUE  Neurological: no TIA or stroke  symptoms; Psychiatric: no nervousness, anxiety or depression.    PHYSICAL EXAM:   Vitals:    18 1429   BP: 113/74   Pulse: 69   Temp: 98.5 °F (36.9 °C)         General appearance:  Alert, well-appearing, and in no distress.  Oriented to person, place, and time   Neurological:  Normal speech, no focal findings noted; CN II - XII grossly intact           Musculoskeletal: Digits/nail without cyanosis/clubbing.  Normal muscle strength/tone.                 Neck: Supple, no significant adenopathy; thyroid is not enlarged                Chest:  Clear to auscultation, symmetric air entry      No use of accessory muscles             Cardiac: Normal rate and regular rhythm, S1 and S2 normal; PMI non-displaced          Abdomen: Soft, non-tender, non-distended, no masses or organomegaly      Extremities:   LUE AVG with palpable thrill, no increased pulsatility, 2+ distal radial pulse      LAB RESULTS:  Lab Results   Component Value Date    WBC 12.56 2018    HGB 14.7 2018    HCT 48.0 2018    MCV 94 2018     2018     BMP  Lab Results   Component Value Date     (L) 2018    K 5.2 (H) 2018    CL 90 (L) 2018    CO2 23 2018    BUN 76 (H) 2018    CREATININE 8.9 (H) 2018    CALCIUM 9.3 2018    ANIONGAP 19 (H) 2018    ESTGFRAFRICA 5.2 (A) 2018    EGFRNONAA 4.5 (A) 2018     Lab Results   Component Value Date    HGBA1C 6.6 (H) 2018       IMAGIN18  Inflow-156  arterial anastomosis-343  proximal graft-236  mid graft-248  distal graft-204  venous anastomosis-178  venous outflow(stent)-91  venous outflow(distal stent)- 191                   volume flow: 1804 ml./min.     VAS US HD ACCESS (18):  Inflow-156  arterial anastomosis-343  proximal graft-236  mid graft-248  distal graft-204  venous anastomosis-178  venous outflow(stent)-91  venous outflow(distal stent)- 191                   volume flow: 1804 ml./min.      Overall Impression:  Color flow duplex exam reveals a patent left hemodialysis AV graft and outflow stents. Mild velocity elevations are noted within the most distal stent. Volume flow at mid graft level measures 1804 ml./min.    01/26/18 Hemodialysis US:  arterial anastomosis-387  proximal graft-177  mid graft-248  distal graft-169  venous anastomosis-177  venous outflow(stent)-155  venous confluence- 191                   volume flow: 1467 ml./min.   Velocity elevation of 636 cm/s is noted in the proximal graft with a visual narrowing - suggestive of a focal stenosis.       IMP/PLAN:  55 y.o. female with HTN, HLD, CHF (EF 20%), COPD on home O2, paroxysmal Afib (on Coumadin), seizure disorder, ESRD on HD TThS via LUE AVG. Doing well today with no concerning findings on ultrasound.    - Ultrasound reviewed - no evidence of hemodynamically significant stenosis  - Return to clinic in 3 months with graft duplex  - continue statin, coumadin    Yonatan Guerra MD  Vascular/Endovascular Surgery Fellow      STAFF ADDENDUM    I have reviewed the relevant data and the resident's assessment and agree with the findings and plan as outlined above.    Torrey Villafana MD  Vascular & Endovascular Surgery

## 2018-05-18 NOTE — PROGRESS NOTES
INR subtherapeutic today. Patient states that she had extra high vitamin K foods x 3 days this week. She also reports occasional bruising from use. She denies any bleeding or other changes that may affect warfarin therapy. Will boost dose today and follow up in 1.5 weeks. Patient advised to call coumadin clinic with any changes or concerns.

## 2018-05-18 NOTE — PROCEDURES
"Wound Debridement  Date/Time: 5/18/2018 5:16 PM  Performed by: DOMI CHANG  Authorized by: DOMI CHANG     Time out: Immediately prior to procedure a "time out" was called to verify the correct patient, procedure, equipment, support staff and site/side marked as required.    Consent Done?:  Yes (Verbal)    Preparation: Patient was prepped and draped in usual sterile fashion    Local anesthesia used?: No      Wound Details:    Location:  Right foot    Location:  Right 1st Toe    Type of Debridement:  Excisional       Length (cm):  0.6       Area (sq cm):  0.24       Width (cm):  0.4       Percent Debrided (%):  100       Depth (cm):  0.1       Total Area Debrided (sq cm):  0.24    Depth of debridement:  Subcutaneous tissue    Tissue debrided:  Dermis, Epidermis and Subcutaneous    Devitalized tissue debrided:  Biofilm, Callus and Fibrin    Instruments:  Curette    Bleeding:  Minimal  Hemostasis Achieved: Yes    Method Used:  Pressure  Patient tolerance:  Patient tolerated the procedure well with no immediate complications          "

## 2018-05-18 NOTE — PROGRESS NOTES
Subjective:      Patient ID: Sisi Medel is a 57 y.o. female.    Chief Complaint: Idiopathic peripheral neuropathy (bilateral pcp Dr Caputo 4/27/18)    Sisi is a 57 y.o. female who presents to the clinic for evaluation and treatment of high risk feet. Sisi has a past medical history of Anemia in ESRD (end-stage renal disease) (3/7/2016); Anticoagulant long-term use; Cervical radiculopathy (7/20/2015); CHF (congestive heart failure); Chronic combined systolic and diastolic heart failure (6/14/2013); Chronic respiratory failure with hypoxia (04/22/2013); Closed fracture of proximal end of right fibula (6/27/2016); Complications due to renal dialysis device, implant, and graft; DDD (degenerative disc disease), lumbar (6/17/2015); Dependence on renal dialysis; Diabetes mellitus type II, controlled (12/8/2017); ESRD on hemodialysis (2/23/2016); Essential hypertension; Gout; Lateral meniscal tear (5/31/2016); Lumbar stenosis (6/17/2015); Pacemaker; Paroxysmal atrial fibrillation (5/16/2014); Peripheral neuropathy (11/13/2013); Personal history of gastric ulcer (3/19/2013); Sarcoidosis, lung (2009); Secondary pulmonary hypertension (4/7/2015); Seizure disorder (3/19/2013); Seizures; Shingles (11/13/2013); and Thyroid disease. weekly follow up for R great toe ulceration. States Football dressing was too tight this time. But she did fine with it otherwise. Otherwise no new concerns. Doing well. Here for wound check. darco shoe feels fine.  This patient has documented high risk feet requiring routine maintenance secondary to peripheral neuropathy.        PCP: Carlos A Caputo MD    Date Last Seen by PCP:   Chief Complaint   Patient presents with    Idiopathic peripheral neuropathy     bilateral pcp Dr Caputo 4/27/18         Current shoe gear:  Affected Foot: Slip-on shoes     Unaffected Foot: Slip-on shoes    Last encounter in this department: Visit date not found    Hemoglobin A1C   Date Value Ref Range  Status   01/02/2018 6.6 (H) 4.0 - 5.6 % Final     Comment:     According to ADA guidelines, hemoglobin A1c <7.0% represents  optimal control in non-pregnant diabetic patients. Different  metrics may apply to specific patient populations.   Standards of Medical Care in Diabetes-2016.  For the purpose of screening for the presence of diabetes:  <5.7%     Consistent with the absence of diabetes  5.7-6.4%  Consistent with increasing risk for diabetes   (prediabetes)  >or=6.5%  Consistent with diabetes  Currently, no consensus exists for use of hemoglobin A1c  for diagnosis of diabetes for children.  This Hemoglobin A1c assay has significant interference with fetal   hemoglobin   (HbF). The results are invalid for patients with abnormal amounts of   HbF,   including those with known Hereditary Persistence   of Fetal Hemoglobin. Heterozygous hemoglobin variants (HbAS, HbAC,   HbAD, HbAE, HbA2) do not significantly interfere with this assay;   however, presence of multiple variants in a sample may impact the %   interference.     12/07/2017 6.5 (H) 4.0 - 5.6 % Final     Comment:     According to ADA guidelines, hemoglobin A1c <7.0% represents  optimal control in non-pregnant diabetic patients. Different  metrics may apply to specific patient populations.   Standards of Medical Care in Diabetes-2016.  For the purpose of screening for the presence of diabetes:  <5.7%     Consistent with the absence of diabetes  5.7-6.4%  Consistent with increasing risk for diabetes   (prediabetes)  >or=6.5%  Consistent with diabetes  Currently, no consensus exists for use of hemoglobin A1c  for diagnosis of diabetes for children.  This Hemoglobin A1c assay has significant interference with fetal   hemoglobin   (HbF). The results are invalid for patients with abnormal amounts of   HbF,   including those with known Hereditary Persistence   of Fetal Hemoglobin. Heterozygous hemoglobin variants (HbAS, HbAC,   HbAD, HbAE, HbA2) do not  significantly interfere with this assay;   however, presence of multiple variants in a sample may impact the %   interference.     09/06/2017 6.8 (H) 4.0 - 5.6 % Final     Comment:     According to ADA guidelines, hemoglobin A1c <7.0% represents  optimal control in non-pregnant diabetic patients. Different  metrics may apply to specific patient populations.   Standards of Medical Care in Diabetes-2016.  For the purpose of screening for the presence of diabetes:  <5.7%     Consistent with the absence of diabetes  5.7-6.4%  Consistent with increasing risk for diabetes   (prediabetes)  >or=6.5%  Consistent with diabetes  Currently, no consensus exists for use of hemoglobin A1c  for diagnosis of diabetes for children.  This Hemoglobin A1c assay has significant interference with fetal   hemoglobin   (HbF). The results are invalid for patients with abnormal amounts of   HbF,   including those with known Hereditary Persistence   of Fetal Hemoglobin. Heterozygous hemoglobin variants (HbAS, HbAC,   HbAD, HbAE, HbA2) do not significantly interfere with this assay;   however, presence of multiple variants in a sample may impact the %   interference.       Past Medical History:   Diagnosis Date    Anemia in ESRD (end-stage renal disease) 3/7/2016    Anticoagulant long-term use     Cervical radiculopathy 7/20/2015    CHF (congestive heart failure)     Chronic combined systolic and diastolic heart failure 6/14/2013    EF 20% 2013, improved with Medical therapy, negative PET 2013    Chronic respiratory failure with hypoxia 04/22/2013    On home oxygen at 2-5 liters    Closed fracture of proximal end of right fibula 6/27/2016    Complications due to renal dialysis device, implant, and graft     DDD (degenerative disc disease), lumbar 6/17/2015    Dependence on renal dialysis     Diabetes mellitus type II, controlled 12/8/2017    ESRD on hemodialysis 2/23/2016    Essential hypertension     Gout     Lateral meniscal  tear 2016    Lumbar stenosis 2015    Pacemaker     Paroxysmal atrial fibrillation 2014    Not on anticoagulation    Peripheral neuropathy 2013    Personal history of gastric ulcer 3/19/2013    Sarcoidosis, lung 2009    Diagnosed in  with ocular manifestation, on 4L home O2 and PO steroids    Secondary pulmonary hypertension 2015    Seizure disorder 3/19/2013    Seizures     Shingles 2013    Thyroid disease        Past Surgical History:   Procedure Laterality Date    ABDOMINAL SURGERY      CARDIAC PACEMAKER PLACEMENT       SECTION      x2    OTHER SURGICAL HISTORY      loop recorder implant    stent to fistula      VASCULAR SURGERY      fistula to L upper arm        Family History   Problem Relation Age of Onset    Kidney failure Mother     Hypertension Mother     Diabetes Mother     Coronary artery disease Father     Hypertension Father     Diabetes Father     Lupus Sister     Diabetes Maternal Grandmother     Colon cancer Neg Hx     Ovarian cancer Neg Hx     Breast cancer Neg Hx        Social History     Social History    Marital status: Single     Spouse name: N/A    Number of children: N/A    Years of education: N/A     Social History Main Topics    Smoking status: Former Smoker     Packs/day: 0.50     Years: 10.00     Types: Cigarettes     Quit date: 1994    Smokeless tobacco: Never Used    Alcohol use No      Comment: rarely    Drug use: No    Sexual activity: Not Currently     Other Topics Concern    Are You Pregnant Or Think You May Be? No    Breast-Feeding No     Social History Narrative    Lives with boyfriend/partner who helps with her care.            Current Outpatient Prescriptions   Medication Sig Dispense Refill    ACZONE 5 % topical gel Apply to face every morning 60 g 2    atorvastatin (LIPITOR) 80 MG tablet TAKE 1 TABLET(80 MG) BY MOUTH EVERY NIGHT 90 tablet 3    blood sugar diagnostic Strp 1 strip by  Misc.(Non-Drug; Combo Route) route 2 (two) times daily with meals. 100 strip 3    blood-glucose meter kit Use as instructed 1 each 0    cholecalciferol, vitamin D3, 1,000 unit capsule Take 1 capsule (1,000 Units total) by mouth once daily. (Patient taking differently: Take 1,000 Units by mouth every evening. )  0    clobetasol 0.05% (TEMOVATE) 0.05 % Oint AAA hand bid - under occlusion qhs 60 g 3    clopidogrel (PLAVIX) 75 mg tablet Take 1 tablet (75 mg total) by mouth once daily. 30 tablet 11    diclofenac sodium (SOLARAZE) 3 % gel       fludrocortisone (FLORINEF) 0.1 mg Tab Take 100 mcg by mouth 2 (two) times daily.      gabapentin (NEURONTIN) 600 MG tablet Take 1 tablet (600 mg total) by mouth 2 (two) times daily. 60 tablet 5    ketorolac 0.5% (ACULAR) 0.5 % Drop instill one drop into both eyes every morning  3    lancets Misc 1 lancet by Misc.(Non-Drug; Combo Route) route 2 (two) times daily with meals. 100 each 3    lancing device Misc 1 Device by Misc.(Non-Drug; Combo Route) route 2 (two) times daily with meals. 1 each 0    levETIRAcetam (KEPPRA) 500 MG Tab Take 1 tablet (500 mg total) by mouth 2 (two) times daily. 180 tablet 3    omeprazole (PRILOSEC) 20 MG capsule TAKE 1 CAPSULE BY MOUTH ONCE DAILY 30 capsule 4    polyethylene glycol (GLYCOLAX) 17 gram/dose powder Take 17 g by mouth once daily. Dissolve in juice. 510 g 0    prednisoLONE acetate (PRED FORTE) 1 % DrpS INSTILL ONE DROP INTO  LEFT EYE THREE TIMES A DAY  3    RENVELA 800 mg Tab TAKE 1 TABLET BY MOUTH THREE TIMES DAILY WITH MEALS 90 tablet 0    SENSIPAR 30 mg Tab daily with breakfast.       tiZANidine (ZANAFLEX) 4 MG tablet 1 tablet by mouth every 8-12 hours as needed for muscle spasm 90 tablet 0    warfarin (COUMADIN) 5 MG tablet Take 1 tablet (5 mg total) by mouth Daily. [facility] (Patient taking differently: Take 5 mg by mouth every evening. Per [facility]) 90 tablet 3    warfarin (COUMADIN) 5 MG tablet TAKE 1  TABLET BY MOUTH EVERYDAY EXCEPT 1 AND 1/2 TABLETS ON MONDAY AND FRIDAY OR AS DIRECTED BY CLINIC 40 tablet 0    betamethasone dipropionate (DIPROLENE) 0.05 % ointment AAA bid hand under occlusion 45 g 3    clindamycin (CLEOCIN T) 1 % lotion AAA face bid 60 mL 3    colchicine 0.6 mg tablet Take 1 tablet (0.6 mg total) by mouth once. 1 tablet 0    midodrine (PROAMATINE) 5 MG Tab Take 2 tablets (10 mg total) by mouth every Mon, Wed, Fri, Sun. 32 tablet 11    predniSONE (DELTASONE) 10 MG tablet TK 1 T PO ONCE D  2    PREVNAR 13, PF, 0.5 mL Syrg ADM 0.5ML IM UTD  0    DENISE-LINDA 0.8 mg Tab TK 1 T PO QD  0     No current facility-administered medications for this visit.        Review of patient's allergies indicates:   Allergen Reactions    Bactrim [sulfamethoxazole-trimethoprim] Other (See Comments)     Causes renal failure    Nsaids (non-steroidal anti-inflammatory drug) Other (See Comments)     Renal failure       Review of Systems   Constitution: Negative for chills and fever.   Cardiovascular: Positive for leg swelling. Negative for chest pain and claudication.   Respiratory: Negative for cough and shortness of breath.    Skin: Positive for dry skin, poor wound healing and suspicious lesions.   Musculoskeletal: Positive for myalgias.   Gastrointestinal: Negative for nausea and vomiting.   Neurological: Positive for numbness and paresthesias.   Psychiatric/Behavioral: Negative for altered mental status.           Objective:      Physical Exam   Constitutional: She is oriented to person, place, and time. She appears well-developed and well-nourished.   HENT:   Head: Normocephalic.   Cardiovascular: Intact distal pulses.    Pedal pulses diminished b/l. Toes cool to touch b/l. 2+ edema noted to b/l LE. No vericosities noted to b/l LEs.      Pulmonary/Chest: No respiratory distress.   Musculoskeletal:   Gastrocnemius equinus noted to b/l ankles with decreased DF noted on exam. MMT 5/5 in DF/PF/Inv/Ev resistance  with no reproduction of pain in any direction. Passive range of motion of ankle and pedal joints is painless. Adequate pedal joint ROM.   No pain on Palpation of R great toe wound.    Feet:   Right Foot:   Protective Sensation: 10 sites tested. 0 sites sensed.   Skin Integrity: Positive for ulcer, skin breakdown and dry skin.   Left Foot:   Protective Sensation: 10 sites tested. 0 sites sensed.   Skin Integrity: Positive for dry skin. Negative for ulcer or skin breakdown.   Neurological: She is alert and oriented to person, place, and time. She has normal strength. A sensory deficit is present.   Light touch, proprioception, and sharp/dull sensation are all intact bilaterally. Protective threshold with the [place]ienstein monofilament is completely diminished bilaterally.      Skin: Skin is warm, dry and intact. Lesion noted. No erythema.   Generalized hyperpigmentation to ankle and lower 1/3 of R leg consistent with hemosiderin deposits. Mild dermatitis R lower 1/3 leg. Overall skin is thin, shiny, atrophic to b/l LEs. Loss of pedal hair b/l. Toes are cool to touch b/l.     New ulceration noted to R great toe medial distal aspect.   Measurement: 0.6 x 0.4 x 0.1cm   Base: 100% granular  Periphery: peeling, loose skin, mildly hyperkeratotic but viable  Undermining/tunelling: none  Drainage: sanguinous only  Erythema: none  Purulence: none  SOI: none    No other open wounds or lesions noted b/l foot.     Diffuse xerosis noted to plantar aspects of b/l foot/heels.      Psychiatric: She has a normal mood and affect. Her behavior is normal. Judgment and thought content normal.   Vitals reviewed.                        Assessment:       Encounter Diagnoses   Name Primary?    Idiopathic peripheral neuropathy Yes    Bilateral lower extremity edema     Toe ulcer, right, with fat layer exposed          Plan:       Sisi was seen today for idiopathic peripheral neuropathy.    Diagnoses and all orders for this  visit:    Idiopathic peripheral neuropathy    Bilateral lower extremity edema    Toe ulcer, right, with fat layer exposed    Other orders  -     Debridement      I counseled the patient on her conditions, their implications and medical management.    - Shoe inspection. General Foot Education. Patient reminded of the importance of good nutrition. Patient instructed on proper foot hygeine. We discussed wearing proper shoe gear, daily foot inspections, never walking without protective shoe gear, caution putting sharp instruments to feet     - Discussed general foot care:  Wear comfortable, proper fitting shoes. Wash feet daily. Dry well. After drying, apply moisturizer to feet (no lotion to webspaces). Inspect feet daily for skin breaks, blisters, swelling, or redness. Wear cotton socks (preferably white)  Change socks every day. Do NOT walk barefoot. Do NOT use heating pads or warm/hot water soaks     R hallux wound inspected and treated. Excisional debridement preformed today. See separate procedure note. Improvement noted in size and appearance.     No SOI at this time. Applied [place], wound foam, football with no compression applied to R great toe and foot today. 2 rolls of cast padding and extra coban applied to reinforce dressing. Applied with minimal compression per patient request. Darco shoe for ambulation.     HH/ wound care dressing changes to continue for dressing change every 3 days with above dressings.     I warned patient to watch for wound recurrence as well as signs and symptoms of infection including redness, drainage, purulence, odor, streaking, fever, chills, etc and I advised her to seek medical attention (ER or urgent car) if these symptoms arise.     RTC 1 week for wound check, sooner PRN

## 2018-05-18 NOTE — BRIEF OP NOTE
[facility]  Brief Operative Note     SUMMARY     Surgery Date: 5/18/2018     Surgeon(s) and Role:     * Torrey Villafana MD - Primary    Assisting Surgeon: Cirilo Seymour MD - Resident    Pre-op Diagnosis:  ESRD (end stage renal disease) on dialysis [N18.6, Z99.2]    Post-op Diagnosis:  same    Procedure(s) (LRB):  Fistulogram (N/A)    Anesthesia: Choice    Description of the findings of the procedure: successful treatment of outflow stenosis     Findings/Key Components: see op note    Estimated Blood Loss: minimal         Specimens:   Specimen (12h ago through future)    None          Discharge Note    SUMMARY     Admit Date: 5/18/2018    Discharge Date and Time:  05/18/2018     Hospital Course (synopsis of major diagnoses, care, treatment, and services provided during the course of the hospital stay): The patient underwent Procedure(s) (LRB):  Fistulogram (N/A). The patient tolerated the outpatient procedure without issue and was discharged home.       Final Diagnosis: ESRD (end stage renal disease) on dialysis [N18.6, Z99.2]    Disposition: Home or Self Care    Follow Up/Patient Instructions: see below    Medications:  Reconciled Home Medications:      Medication List      CHANGE how you take these medications    cholecalciferol (vitamin D3) 1,000 unit capsule  Take 1 capsule (1,000 Units total) by mouth once daily.  What changed:  when to take this     * warfarin 5 MG tablet  Commonly known as:  COUMADIN  Take 1 tablet (5 mg total) by mouth Daily. Per [facility]  What changed:  · when to take this  · additional instructions     * warfarin 5 MG tablet  Commonly known as:  COUMADIN  TAKE 1 TABLET BY MOUTH EVERYDAY EXCEPT 1 AND 1/2 TABLETS ON MONDAY AND FRIDAY OR AS DIRECTED BY CLINIC  What changed:  Another medication with the same name was changed. Make sure you understand how and when to take each.        * This list has 2 medication(s) that are the same as other medications  prescribed for you. Read the directions carefully, and ask your doctor or other care provider to review them with you.            CONTINUE taking these medications    ACZONE 5 % topical gel  Generic drug:  dapsone  Apply to face every morning     atorvastatin 80 MG tablet  Commonly known as:  LIPITOR  TAKE 1 TABLET(80 MG) BY MOUTH EVERY NIGHT     betamethasone dipropionate 0.05 % ointment  Commonly known as:  DIPROLENE  AAA bid hand under occlusion     blood sugar diagnostic Strp  1 strip by Misc.(Non-Drug; Combo Route) route 2 (two) times daily with meals.     blood-glucose meter kit  Use as instructed     clindamycin 1 % lotion  Commonly known as:  CLEOCIN T  AAA face bid     clobetasol 0.05% 0.05 % Oint  Commonly known as:  TEMOVATE  AAA hand bid - under occlusion qhs     clopidogrel 75 mg tablet  Commonly known as:  PLAVIX  Take 1 tablet (75 mg total) by mouth once daily.     colchicine 0.6 mg tablet  Take 1 tablet (0.6 mg total) by mouth once.     diclofenac sodium 3 % gel  Commonly known as:  SOLARAZE     fludrocortisone 0.1 mg Tab  Commonly known as:  FLORINEF  Take 100 mcg by mouth 2 (two) times daily.     gabapentin 600 MG tablet  Commonly known as:  NEURONTIN  Take 1 tablet (600 mg total) by mouth 2 (two) times daily.     ketorolac 0.5% 0.5 % Drop  Commonly known as:  ACULAR  instill one drop into both eyes every morning     lancets Misc  1 lancet by Misc.(Non-Drug; Combo Route) route 2 (two) times daily with meals.     lancing device Misc  1 Device by Misc.(Non-Drug; Combo Route) route 2 (two) times daily with meals.     levETIRAcetam 500 MG Tab  Commonly known as:  KEPPRA  Take 1 tablet (500 mg total) by mouth 2 (two) times daily.     midodrine 5 MG Tab  Commonly known as:  PROAMATINE  Take 2 tablets (10 mg total) by mouth every Mon, Wed, Fri, Sun.     omeprazole 20 MG capsule  Commonly known as:  PRILOSEC  TAKE 1 CAPSULE BY MOUTH ONCE DAILY     polyethylene glycol 17 gram/dose powder  Commonly known as:   GLYCOLAX  Take 17 g by mouth once daily. Dissolve in juice.     prednisoLONE acetate 1 % Drps  Commonly known as:  PRED FORTE  INSTILL ONE DROP INTO  LEFT EYE THREE TIMES A DAY     predniSONE 10 MG tablet  Commonly known as:  DELTASONE  TK 1 T PO ONCE D     PREVNAR 13 (PF) 0.5 mL Syrg  Generic drug:  pneumoc 13-chandana conj-dip cr(PF)  ADM 0.5ML IM UTD     DENISE-LINDA 0.8 mg Tab  Generic drug:  B complex-vitamin C-folic acid  TK 1 T PO QD     RENVELA 800 mg Tab  Generic drug:  sevelamer carbonate  TAKE 1 TABLET BY MOUTH THREE TIMES DAILY WITH MEALS     SENSIPAR 30 MG Tab  Generic drug:  cinacalcet  daily with breakfast.     tiZANidine 4 MG tablet  Commonly known as:  ZANAFLEX  1 tablet by mouth every 8-12 hours as needed for muscle spasm        STOP taking these medications    doxycycline 100 MG tablet  Commonly known as:  VIBRA-TABS            Discharge Procedure Orders  Diet general     Activity as tolerated     Call MD for:  temperature >100.4     Call MD for:  redness, tenderness, or signs of infection (pain, swelling, redness, odor or green/yellow discharge around incision site)     Call MD for:  severe uncontrolled pain     Remove dressing in 48 hours       Follow-up Information     Torrey Villafana MD. Schedule an appointment as soon as possible for a visit in 3 weeks.    Specialty:  Vascular Surgery  Why:  For wound re-check  Contact information:  [address]  [facility] [address]  [phone]

## 2018-05-18 NOTE — TELEPHONE ENCOUNTER
----- Message from Rhonda Reddy sent at 5/18/2018  3:54 PM CDT -----  Contact: Yovana with [facility]/ [phone] Option 1  Yovana is calling to notify office that pt keeps refusing wound care from Home Health. Please call pt to discuss and then call office back to follow up. Thank you.

## 2018-05-18 NOTE — NURSING
Discharge instructions and medlist given.  Patient and  verbalized understanding.  Denies need for wheelchair or escort for discharge off unit.  Off unit walking with .

## 2018-05-18 NOTE — PROGRESS NOTES
Pt seen by Yaniv WARE. I have reviewed her initial findings and agree with her assessment and plan

## 2018-05-18 NOTE — PROGRESS NOTES
Subjective:       Patient ID:  Sisi Medel is a 57 y.o. female who presents for   Chief Complaint   Patient presents with    Rash     improving, occ mild itch, tender due to creases of skin cracking still using Rx-ointment which is helping      Rash  - Follow-up  Diagnosis: callous with fissures.  Symptom course: improving  Currently using: IL K at last visit 4/2/18 and diprolene oint bid   Affected locations: right hand and left hand  Signs / symptoms: tender (right > left but improved)        Review of Systems   Skin: Positive for rash. Negative for itching.   Hematologic/Lymphatic: Bruises/bleeds easily (on plavix and coumadin and pred and asa).        Objective:    Physical Exam   Constitutional: She appears well-developed and well-nourished. She is obese.  She is on home oxygen.  No distress.   Neurological: She is alert and oriented to person, place, and time. She is not disoriented.   Psychiatric: She has a normal mood and affect.   Skin:   Areas Examined (abnormalities noted in diagram):   RUE Inspected  LUE Inspection Performed  Nails and Digits Inspection Performed             Diagram Legend     Erythematous scaling macule/papule c/w actinic keratosis       Vascular papule c/w angioma      Pigmented verrucoid papule/plaque c/w seborrheic keratosis      Yellow umbilicated papule c/w sebaceous hyperplasia      Irregularly shaped tan macule c/w lentigo     1-2 mm smooth white papules consistent with Milia      Movable subcutaneous cyst with punctum c/w epidermal inclusion cyst      Subcutaneous movable cyst c/w pilar cyst      Firm pink to brown papule c/w dermatofibroma      Pedunculated fleshy papule(s) c/w skin tag(s)      Evenly pigmented macule c/w junctional nevus     Mildly variegated pigmented, slightly irregular-bordered macule c/w mildly atypical nevus      Flesh colored to evenly pigmented papule c/w intradermal nevus       Pink pearly papule/plaque c/w basal cell carcinoma       Erythematous hyperkeratotic cursted plaque c/w SCC      Surgical scar with no sign of skin cancer recurrence      Open and closed comedones      Inflammatory papules and pustules      Verrucoid papule consistent consistent with wart     Erythematous eczematous patches and plaques     Dystrophic onycholytic nail with subungual debris c/w onychomycosis     Umbilicated papule    Erythematous-base heme-crusted tan verrucoid plaque consistent with inflamed seborrheic keratosis     Erythematous Silvery Scaling Plaque c/w Psoriasis     See annotation      Assessment / Plan:        Callous ulcer, limited to breakdown of skin - improved  -     betamethasone dipropionate (DIPROLENE) 0.05 % ointment; AAA bid hand under occlusion  Dispense: 45 g; Refill: 3    Neoplasm of uncertain behavior of skin - right hand  ? gout    Other orders - instead of aczone which isn't covered  -     clindamycin (CLEOCIN T) 1 % lotion; AAA face bid  Dispense: 60 mL; Refill: 3             Follow-up in about 6 months (around 11/18/2018).

## 2018-05-18 NOTE — OP NOTE
Brief Operative Note  Date: 05/18/2018    Surgeon(s) and Role:     * Torrey Villafana MD - Primary    Pre-op Diagnosis:  ESRD (end stage renal disease) on dialysis [N18.6, Z99.2]; AVG stenosis sequelae - high venous pressures on HD circuit    Post-op Diagnosis:  Same    Procedure(s):  1) LUE fistulagram  2) PTA mid-graft stenosis x 2, LUE AVG (8 x 40 Conquest balloon)  3) PTA LUE AVG outlfow venous stenosis (10 x 40 [place])  4) PTA LUE AVG instent restenosis, venous outlfow (8 x 40 Conquest)  5) 60 minutes conscious sedation      Surgeon: .Torrey Villafana MD  Vascular & Endovascular Surgery       Assistant: MIMI Seymour MD    Anesthesia: Choice    Findings/Key Components:  Successful treatment of midgraft and multifocal outflow stenoses, LUE AVG.  Palpable thrill and brisk flow at completion.     EBL: Minimal         Specimens     None          I attest to being present for the procedure and performing the case.  Torrey Villafana MD  Vascular & Endovascular Surgery     Discharge Note  SUMMARY    Admit Date: 5/18/2018    Attending Physician: Torrey Villafana MD  Vascular & Endovascular Surgery       Discharge Physician: Torrey Villafana MD  Vascular & Endovascular Surgery       Discharge Date: 05/18/2018    Final Diagnosis: ESRD (end stage renal disease) on dialysis [N18.6, Z99.2]    Disposition: Home or self-care    Patient Instructions:   Discharge Medication List as of 5/18/2018  2:28 PM      CONTINUE these medications which have NOT CHANGED    Details   ACZONE 5 % topical gel Apply to face every morning, Normal      atorvastatin (LIPITOR) 80 MG tablet TAKE 1 TABLET(80 MG) BY MOUTH EVERY NIGHT, Normal      betamethasone dipropionate (DIPROLENE) 0.05 % ointment AAA bid hand under occlusion, Normal      blood sugar diagnostic Strp 1 strip by Misc.(Non-Drug; Combo Route) route 2 (two) times daily with meals., Starting Thu 2/1/2018, Print      blood-glucose meter kit Use as instructed, Print       cholecalciferol, vitamin D3, 1,000 unit capsule Take 1 capsule (1,000 Units total) by mouth once daily., Starting 3/8/2017, Until Discontinued, No Print      clindamycin (CLEOCIN T) 1 % lotion AAA face bid, Normal      clobetasol 0.05% (TEMOVATE) 0.05 % Oint AAA hand bid - under occlusion qhs, Normal      clopidogrel (PLAVIX) 75 mg tablet Take 1 tablet (75 mg total) by mouth once daily., Starting Mon 3/26/2018, Normal      colchicine 0.6 mg tablet Take 1 tablet (0.6 mg total) by mouth once., Starting Wed 5/16/2018, Normal      diclofenac sodium (SOLARAZE) 3 % gel Historical Med      fludrocortisone (FLORINEF) 0.1 mg Tab Take 100 mcg by mouth 2 (two) times daily., Until Discontinued, Historical Med      gabapentin (NEURONTIN) 600 MG tablet Take 1 tablet (600 mg total) by mouth 2 (two) times daily., Starting Mon 5/7/2018, Until Tue 5/7/2019, Normal      ketorolac 0.5% (ACULAR) 0.5 % Drop instill one drop into both eyes every morning, Historical Med      lancets Misc 1 lancet by Misc.(Non-Drug; Combo Route) route 2 (two) times daily with meals., Starting Thu 2/1/2018, Print      lancing device Misc 1 Device by Misc.(Non-Drug; Combo Route) route 2 (two) times daily with meals., Starting Thu 2/1/2018, Until Fri 2/1/2019, Print      levETIRAcetam (KEPPRA) 500 MG Tab Take 1 tablet (500 mg total) by mouth 2 (two) times daily., Starting Tue 12/19/2017, Normal      midodrine (PROAMATINE) 5 MG Tab Take 2 tablets (10 mg total) by mouth every Mon, Wed, Fri, Sun., Starting Sun 2/19/2017, Until Fri 5/18/2018, Normal      omeprazole (PRILOSEC) 20 MG capsule TAKE 1 CAPSULE BY MOUTH ONCE DAILY, Normal      polyethylene glycol (GLYCOLAX) 17 gram/dose powder Take 17 g by mouth once daily. Dissolve in juice., Starting 2/11/2017, Until Discontinued, Normal      prednisoLONE acetate (PRED FORTE) 1 % DrpS INSTILL ONE DROP INTO  LEFT EYE THREE TIMES A DAY, Historical Med      predniSONE (DELTASONE) 10 MG tablet TK 1 T PO ONCE D,  Historical Med      PREVNAR 13, PF, 0.5 mL Syrg ADM 0.5ML IM UTD, Historical Med      DENISE-LINDA 0.8 mg Tab TK 1 T PO QD, Historical Med      RENVELA 800 mg Tab TAKE 1 TABLET BY MOUTH THREE TIMES DAILY WITH MEALS, Normal      SENSIPAR 30 mg Tab daily with breakfast. , Starting Tue 5/23/2017, Historical Med      tiZANidine (ZANAFLEX) 4 MG tablet 1 tablet by mouth every 8-12 hours as needed for muscle spasm, Normal      !! warfarin (COUMADIN) 5 MG tablet Take 1 tablet (5 mg total) by mouth Daily. Per [facility], Starting 4/3/2017, Until Discontinued, Normal      !! warfarin (COUMADIN) 5 MG tablet TAKE 1 TABLET BY MOUTH EVERYDAY EXCEPT 1 AND 1/2 TABLETS ON MONDAY AND FRIDAY OR AS DIRECTED BY CLINIC, Normal       !! - Potential duplicate medications found. Please discuss with provider.      STOP taking these medications       doxycycline (VIBRA-TABS) 100 MG tablet Comments:   Reason for Stopping:               Diet:  Resume pre-operative diet    Activity:  Ad rosa    Follow-up:  Follow-up in clinic with Dr Villafana within 1-2 weeks; please call clinic nurse at

## 2018-05-21 ENCOUNTER — CLINICAL SUPPORT (OUTPATIENT)
Dept: REHABILITATION | Facility: HOSPITAL | Age: 58
End: 2018-05-21
Attending: INTERNAL MEDICINE
Payer: MEDICARE

## 2018-05-21 DIAGNOSIS — R26.81 GAIT INSTABILITY: ICD-10-CM

## 2018-05-21 DIAGNOSIS — M62.81 MUSCLE WEAKNESS: ICD-10-CM

## 2018-05-21 PROCEDURE — 97110 THERAPEUTIC EXERCISES: CPT | Mod: PO

## 2018-05-21 NOTE — PROGRESS NOTES
OUTPATIENT PHYSICAL THERAPY  PHYSICAL THERAPY PROGRESS NOTE     Name: Sisi Medel  Clinic Number: [id number]     Evaluation Date: 05/14/2018  Visit #: 2 / 8  Authorization period Expiration: 07/14/2018  Plan of Care Expiration: 07/14/2018  Precautions: Standard, O2 dependent      Diagnosis:        Encounter Diagnoses   Name Primary?    Muscle weakness      Gait instability        Physician: Carlos A Caputo MD  Treatment Orders: PT Eval and Treat       Past Medical History:   Diagnosis Date    Anemia in ESRD (end-stage renal disease) 3/7/2016    Anticoagulant long-term use      Cervical radiculopathy 7/20/2015    CHF (congestive heart failure)      Chronic combined systolic and diastolic heart failure 6/14/2013     EF 20% 2013, improved with Medical therapy, negative PET 2013    Chronic respiratory failure with hypoxia 04/22/2013     On home oxygen at 2-5 liters    Closed fracture of proximal end of right fibula 6/27/2016    Complications due to renal dialysis device, implant, and graft      DDD (degenerative disc disease), lumbar 6/17/2015    Dependence on renal dialysis      Diabetes mellitus type II, controlled 12/8/2017    ESRD on hemodialysis 2/23/2016    Essential hypertension      Gout      Lateral meniscal tear 5/31/2016    Lumbar stenosis 6/17/2015    Pacemaker      Paroxysmal atrial fibrillation 5/16/2014     Not on anticoagulation    Peripheral neuropathy 11/13/2013    Personal history of gastric ulcer 3/19/2013    Sarcoidosis, lung 2009     Diagnosed in 2009 with ocular manifestation, on 4L home O2 and PO steroids    Secondary pulmonary hypertension 4/7/2015    Seizure disorder 3/19/2013    Seizures      Shingles 11/13/2013    Thyroid disease          History   Prior Therapy: Prior therapy for same condition   Social History: Lives in apartment; [address]; lives with fiance   Previous functional status: Prior to incident, pt independent in all ADLs and physical  "activity    Current functional status: Uses shower chair, requires increased time to complete ADLs   Work: Not currently working     History of Present Illness: Pt presents to [facility] with complaints of difficulty walking and transferring from low surfaces. Pt reports she has fallen 3 times this year due to instability. Pt additionally reports she is unable to stand from low surfaces without assistance or UE support. Pt currently ambulating with SBQC and boot on RLE to protect wound on great toe. Pt reports she has had feelings of instability for approximately 1 year, with conditions worsening. Pt uses rollator walker when ambulating long distances. Pt currently utilizes 3L of O2 at all times due to sarcoidosis of the lungs. Pt reports 6 year history of dependent O2 use.          Subjective     Pt reports her body starts to act up and that will cause pain.  Pt reports yesterday, she had "a beautiful day" with no pain.  Pt reports she woke up this morning with L knee pain.  Pt reports she took some tylenol and this help.  Pt reports she did the exercise about 10 times since last week.    Pain: current 0/10       Objective     Time In: 2:05 pm  Time Out: 3:00 pm    Observation: pt amb with quad cane; however, only puts 1-2 prongs down when ambulating forward     Vitals at the beginning of treatment:   O2: 98%   HR: 70 bpm   BP: 130/96 mmHg Manually in R arm      Sisi received 55minutes of therapeutic exercises including:     Seated Marching 15 x 3" hold   LAQ 15 x 3" hold   Glut sets 10 x 5" hold   Seated heel raises x 20 - Blood pressure 134/101    Supine hip abduction with GTB x 20 5 sec hold- Blood pressure 121/92    Supine hip adduction x 15  SLR x 5- Blood pressure 136/98    DKTC with orange theraband x 15- Blood pressure: 121/89    Sit<>stand x 10              Written Home Exercises Provided:  supine hip adduction, supine hip abduction, SLR  Exercises were reviewed and Sisi was able to demonstrate " them prior to the end of the session. Pt received a written copy of exercises to perform at home. Sisi demonstrated good  understanding of the education provided.         Assessment   Pt tolerated treatment fairly with some fatigue following treatment.  Pt required rest breaks during treatment secondary to elevated blood pressure.  Pt with no reports of dizziness, headaches, or SOB throughout treatment.  Pt displayed increased difficulty performing SLR.  Will progress as tolerated and continue to monitor pressure throughout treatment.  Pt will benefit from skilled outpatient physical therapy to address the above stated deficits, provide pt/family education, and to maximize pt's level of independence.              History  Co-morbidities and personal factors that may impact the plan of care Examination  Body Structures and Functions, activity limitations and participation restrictions that may impact the plan of care      Clinical Presentation   Co-morbidities:   COPD/asthma and high BMI           Personal Factors:   no deficits Body Regions:   lower extremities  upper extremities     Body Systems:   strength  balance  gait           Participation Restrictions:   Pt limited in walking distances, and is unable to transfer from low surfaces independently       Activity limitations:   Learning and applying knowledge  no deficits     General Tasks and Commands  no deficits     Communication  no deficits     Mobility  no deficits     Self care  no deficits     Domestic Life  no deficits     Interactions/Relationships  no deficits     Life Areas  no deficits     Community and Social Life  no deficits             stable and uncomplicated                                moderate   moderate   moderate Decision Making/ Complexity Score:  moderate      G Codes:   Current: CK at least 40% < 60% impaired, limited or restricted  Goal: CK at least 40% < 60% impaired, limited or restricted     Pt's spiritual, cultural and  educational needs considered and pt agreeable to plan of care and goals as stated below:      Anticipated Barriers for physical therapy: none      Short Term GOALS:  In 4 weeks, pt. will:  Pt will perform sit to stand transfer from standard height chair with no use of UEs and < or = min A in order to be independent with transfers.  Pt will increase tinetti socre to 18/28 to improve balance.  Pt will navigate 4 steps with < or = min A with 2 HR in order to improve functional mobility.  Pt will improve B hip flexion strength by 1 grade in order to improve stability with functional tasks.  Pt to tolerate HEP to improve ROM and independence with ADL's     Long term goals  In 8 weeks, pt will:  Pt will be able to perform 5 consecutive sit to stand transfers s/ UE use on first attempts from std ht chair in order to improve transfer skills  Pt will improve Tinetti score to > or = 22/28 in order to improve functional mobility  Pt will navigate 7 steps with < or = min A with 2 HR in order to tolerance to ambulation.  Pt will improve B hip flexion and abduction strength to 4+/5 MMT grade in order to improve stability with upright tasks.  Pt demonstrate independence in HEP and POC to improve ROM and independence with ADL's.        Plan   Outpatient physical therapy 1 - 2 times weekly to include: pt ed, HEP, therapeutic exercises, therapeutic activities, neuromuscular re-education/ balance exercises, manual therapy, dry needling, and modalities prn. Cont PT for 6 - 8 weeks. Pt may be seen by PTA as part of the rehabilitation team.      I certify the need for these services furnished under this plan of treatment and while under my care.

## 2018-05-23 ENCOUNTER — CLINICAL SUPPORT (OUTPATIENT)
Dept: ELECTROPHYSIOLOGY | Facility: CLINIC | Age: 58
End: 2018-05-23
Attending: INTERNAL MEDICINE
Payer: MEDICARE

## 2018-05-23 DIAGNOSIS — I48.21 PERMANENT ATRIAL FIBRILLATION: ICD-10-CM

## 2018-05-23 DIAGNOSIS — I44.2 CHB (COMPLETE HEART BLOCK): ICD-10-CM

## 2018-05-23 DIAGNOSIS — Z95.0 CARDIAC PACEMAKER IN SITU: ICD-10-CM

## 2018-05-23 PROCEDURE — 93296 REM INTERROG EVL PM/IDS: CPT | Mod: S$GLB,,, | Performed by: INTERNAL MEDICINE

## 2018-05-23 PROCEDURE — 93294 REM INTERROG EVL PM/LDLS PM: CPT | Mod: S$GLB,,, | Performed by: INTERNAL MEDICINE

## 2018-05-24 NOTE — OP NOTE
DATE OF PROCEDURE:  05/18/2018.    PREOPERATIVE DIAGNOSES:  AV graft stenosis sequelae, high venous pressures on HD   circuit.    POSTOPERATIVE DIAGNOSES:  AV graft stenosis sequelae, high venous pressures on   HD circuit.    PROCEDURES PERFORMED:  1.  Left upper extremity fistulogram.  2.  PTA of mid graft stenosis x2, left upper extremity graft with 8 x 40   Conquest balloon.  3.  PTA of left upper extremity AV graft outflow stenosis with a 10 x 40 [place]   balloon.  4.  PTA of left upper extremity AV graft in-stent restenosis of venous outflow   with 8 x 40 Conquest balloon.    SURGEON:  Torrey Villafana M.D.    ASSISTANT:  Cirilo Seymour M.D. (RES).    ANESTHESIA:  MAC plus local.    ESTIMATED BLOOD LOSS:  Minimal.    COMPLICATIONS:  None.    DESCRIPTION OF PROCEDURE:  The patient is a 57-year-old female well known to me.    I placed a left upper extremity AV graft for hemodialysis access several years   ago, which has required several interventions to maintain patency.  Despite   good flow volume on a recent duplex ultrasound, her nephrologist had contacted   me, reporting high venous pressures on the HD circuit, and for this reason, an   intervention was planned to diagnose and treat potential problem.    The patient was identified as Sisi Medel and brought to the   Catheterization Laboratory.  Her left arm was prepped and draped in the standard   sterile fashion, and after a team-wide timeout, during which the procedure and   laterality were agreed upon by all Operating Room staff, a micropuncture needle   was used to access the arterial inflow aspect of the upper extremity graft after   a subcutaneous wheal of 1% lidocaine had been raised.  Through the   micropuncture needle, a micropuncture wire was advanced under fluoroscopic   guidance.  The needle was exchanged out for a micropuncture sheath and a   diagnostic fistulogram revealed several areas of stenosis that required   treatment and  intervention was planned.    The micropuncture sheath was exchanged out for a short 6-[place] working sheath.    A stiff angled glide was advanced beyond all areas of stenosis and an 8 x 40   Conquest balloon was brought onto the field and a serially dilated along mid   graft and outflow stenosis to inflation pressures of 2 minutes with resolution   of the observed stenosis.    A followup fistulogram demonstrated resolution of those stenosis to within 15%.    We next turned our attention to a severe outflow stenosis, which was treated   with a 10 x 40 [place] balloon, which was inflated for 2 minutes.  The stenosis   was seen to resolve.  Finally, an in-stent re-stenosis in the previously placed   venous outflow stent was observed and treated with an 8 x 40 Conquest balloon   successfully.    A left upper extremity repeat fistulogram revealed brisk flow throughout the   graft and no visible moderate or severe stenoses.  At this point, the wires and   catheters were removed.  The venous site was oversewn with a 3-0 Monocryl suture   and a 6-[place] sheath was removed.  Hemostasis was evident.    Torrey NICE, was present for the entirety of the operation and   supervised approximately one hour of anesthesia time.  For specific dosages of   the drugs, please see the Nursing record.      KARIE/RENUKA  dd: 05/24/2018 09:07:48 (CDT)  td: 05/24/2018 10:52:18 (CDT)  Doc ID   #[id number]  Job ID #[id number]    CC:

## 2018-05-29 ENCOUNTER — OFFICE VISIT (OUTPATIENT)
Dept: PODIATRY | Facility: CLINIC | Age: 58
End: 2018-05-29
Payer: MEDICARE

## 2018-05-29 ENCOUNTER — ANTI-COAG VISIT (OUTPATIENT)
Dept: CARDIOLOGY | Facility: CLINIC | Age: 58
End: 2018-05-29
Payer: MEDICARE

## 2018-05-29 VITALS
SYSTOLIC BLOOD PRESSURE: 109 MMHG | BODY MASS INDEX: 40.62 KG/M2 | HEART RATE: 70 BPM | HEIGHT: 68 IN | DIASTOLIC BLOOD PRESSURE: 78 MMHG | WEIGHT: 268 LBS

## 2018-05-29 DIAGNOSIS — G60.9 IDIOPATHIC PERIPHERAL NEUROPATHY: Primary | ICD-10-CM

## 2018-05-29 DIAGNOSIS — Z79.01 CURRENT USE OF LONG TERM ANTICOAGULATION: Primary | ICD-10-CM

## 2018-05-29 DIAGNOSIS — L97.511 TOE ULCER, RIGHT, LIMITED TO BREAKDOWN OF SKIN: ICD-10-CM

## 2018-05-29 LAB — INR PPP: 3.1 (ref 2–3)

## 2018-05-29 PROCEDURE — 97597 DBRDMT OPN WND 1ST 20 CM/<: CPT | Mod: S$GLB,,, | Performed by: PODIATRIST

## 2018-05-29 PROCEDURE — 3008F BODY MASS INDEX DOCD: CPT | Mod: CPTII,S$GLB,, | Performed by: PODIATRIST

## 2018-05-29 PROCEDURE — 99999 PR PBB SHADOW E&M-EST. PATIENT-LVL III: CPT | Mod: PBBFAC,,, | Performed by: PODIATRIST

## 2018-05-29 PROCEDURE — 85610 PROTHROMBIN TIME: CPT | Mod: QW,S$GLB,, | Performed by: INTERNAL MEDICINE

## 2018-05-29 PROCEDURE — 99499 UNLISTED E&M SERVICE: CPT | Mod: S$GLB,,, | Performed by: PODIATRIST

## 2018-05-29 NOTE — PROGRESS NOTES
Subjective:      Patient ID: Sisi Medel is a 57 y.o. female.    Chief Complaint: Idiopathic peripheral neuropathy (rt foot)    Sisi is a 57 y.o. female who presents to the clinic for evaluation and treatment of high risk feet. Sisi has a past medical history of Anemia in ESRD (end-stage renal disease) (3/7/2016); Anticoagulant long-term use; Cervical radiculopathy (7/20/2015); CHF (congestive heart failure); Chronic combined systolic and diastolic heart failure (6/14/2013); Chronic respiratory failure with hypoxia (04/22/2013); Closed fracture of proximal end of right fibula (6/27/2016); Complications due to renal dialysis device, implant, and graft; DDD (degenerative disc disease), lumbar (6/17/2015); Dependence on renal dialysis; Diabetes mellitus type II, controlled (12/8/2017); ESRD on hemodialysis (2/23/2016); Essential hypertension; Gout; Lateral meniscal tear (5/31/2016); Lumbar stenosis (6/17/2015); Pacemaker; Paroxysmal atrial fibrillation (5/16/2014); Peripheral neuropathy (11/13/2013); Personal history of gastric ulcer (3/19/2013); Sarcoidosis, lung (2009); Secondary pulmonary hypertension (4/7/2015); Seizure disorder (3/19/2013); Seizures; Shingles (11/13/2013); and Thyroid disease. weekly follow up for R great toe ulceration. Sno new concerns. Did well last week.  This patient has documented high risk feet requiring routine maintenance secondary to peripheral neuropathy.        PCP: Carlos A Caputo MD    Date Last Seen by PCP:   Chief Complaint   Patient presents with    Idiopathic peripheral neuropathy     rt foot         Current shoe gear:  Affected Foot: Slip-on shoes     Unaffected Foot: Slip-on shoes    Last encounter in this department: Visit date not found    Hemoglobin A1C   Date Value Ref Range Status   01/02/2018 6.6 (H) 4.0 - 5.6 % Final     Comment:     According to ADA guidelines, hemoglobin A1c <7.0% represents  optimal control in non-pregnant diabetic patients.  Different  metrics may apply to specific patient populations.   Standards of Medical Care in Diabetes-2016.  For the purpose of screening for the presence of diabetes:  <5.7%     Consistent with the absence of diabetes  5.7-6.4%  Consistent with increasing risk for diabetes   (prediabetes)  >or=6.5%  Consistent with diabetes  Currently, no consensus exists for use of hemoglobin A1c  for diagnosis of diabetes for children.  This Hemoglobin A1c assay has significant interference with fetal   hemoglobin   (HbF). The results are invalid for patients with abnormal amounts of   HbF,   including those with known Hereditary Persistence   of Fetal Hemoglobin. Heterozygous hemoglobin variants (HbAS, HbAC,   HbAD, HbAE, HbA2) do not significantly interfere with this assay;   however, presence of multiple variants in a sample may impact the %   interference.     12/07/2017 6.5 (H) 4.0 - 5.6 % Final     Comment:     According to ADA guidelines, hemoglobin A1c <7.0% represents  optimal control in non-pregnant diabetic patients. Different  metrics may apply to specific patient populations.   Standards of Medical Care in Diabetes-2016.  For the purpose of screening for the presence of diabetes:  <5.7%     Consistent with the absence of diabetes  5.7-6.4%  Consistent with increasing risk for diabetes   (prediabetes)  >or=6.5%  Consistent with diabetes  Currently, no consensus exists for use of hemoglobin A1c  for diagnosis of diabetes for children.  This Hemoglobin A1c assay has significant interference with fetal   hemoglobin   (HbF). The results are invalid for patients with abnormal amounts of   HbF,   including those with known Hereditary Persistence   of Fetal Hemoglobin. Heterozygous hemoglobin variants (HbAS, HbAC,   HbAD, HbAE, HbA2) do not significantly interfere with this assay;   however, presence of multiple variants in a sample may impact the %   interference.     09/06/2017 6.8 (H) 4.0 - 5.6 % Final     Comment:      According to ADA guidelines, hemoglobin A1c <7.0% represents  optimal control in non-pregnant diabetic patients. Different  metrics may apply to specific patient populations.   Standards of Medical Care in Diabetes-2016.  For the purpose of screening for the presence of diabetes:  <5.7%     Consistent with the absence of diabetes  5.7-6.4%  Consistent with increasing risk for diabetes   (prediabetes)  >or=6.5%  Consistent with diabetes  Currently, no consensus exists for use of hemoglobin A1c  for diagnosis of diabetes for children.  This Hemoglobin A1c assay has significant interference with fetal   hemoglobin   (HbF). The results are invalid for patients with abnormal amounts of   HbF,   including those with known Hereditary Persistence   of Fetal Hemoglobin. Heterozygous hemoglobin variants (HbAS, HbAC,   HbAD, HbAE, HbA2) do not significantly interfere with this assay;   however, presence of multiple variants in a sample may impact the %   interference.       Past Medical History:   Diagnosis Date    Anemia in ESRD (end-stage renal disease) 3/7/2016    Anticoagulant long-term use     Cervical radiculopathy 7/20/2015    CHF (congestive heart failure)     Chronic combined systolic and diastolic heart failure 6/14/2013    EF 20% 2013, improved with Medical therapy, negative PET 2013    Chronic respiratory failure with hypoxia 04/22/2013    On home oxygen at 2-5 liters    Closed fracture of proximal end of right fibula 6/27/2016    Complications due to renal dialysis device, implant, and graft     DDD (degenerative disc disease), lumbar 6/17/2015    Dependence on renal dialysis     Diabetes mellitus type II, controlled 12/8/2017    ESRD on hemodialysis 2/23/2016    Essential hypertension     Gout     Lateral meniscal tear 5/31/2016    Lumbar stenosis 6/17/2015    Pacemaker     Paroxysmal atrial fibrillation 5/16/2014    Not on anticoagulation    Peripheral neuropathy 11/13/2013    Personal  history of gastric ulcer 3/19/2013    Sarcoidosis, lung 2009    Diagnosed in  with ocular manifestation, on 4L home O2 and PO steroids    Secondary pulmonary hypertension 2015    Seizure disorder 3/19/2013    Seizures     Shingles 2013    Thyroid disease        Past Surgical History:   Procedure Laterality Date    ABDOMINAL SURGERY      CARDIAC PACEMAKER PLACEMENT       SECTION      x2    OTHER SURGICAL HISTORY      loop recorder implant    stent to fistula      VASCULAR SURGERY      fistula to L upper arm        Family History   Problem Relation Age of Onset    Kidney failure Mother     Hypertension Mother     Diabetes Mother     Coronary artery disease Father     Hypertension Father     Diabetes Father     Lupus Sister     Diabetes Maternal Grandmother     Colon cancer Neg Hx     Ovarian cancer Neg Hx     Breast cancer Neg Hx        Social History     Social History    Marital status: Single     Spouse name: N/A    Number of children: N/A    Years of education: N/A     Social History Main Topics    Smoking status: Former Smoker     Packs/day: 0.50     Years: 10.00     Types: Cigarettes     Quit date: 1994    Smokeless tobacco: Never Used    Alcohol use No      Comment: rarely    Drug use: No    Sexual activity: Not Currently     Other Topics Concern    Are You Pregnant Or Think You May Be? No    Breast-Feeding No     Social History Narrative    Lives with boyfriend/partner who helps with her care.            Current Outpatient Prescriptions   Medication Sig Dispense Refill    ACZONE 5 % topical gel Apply to face every morning 60 g 2    atorvastatin (LIPITOR) 80 MG tablet TAKE 1 TABLET(80 MG) BY MOUTH EVERY NIGHT 90 tablet 3    betamethasone dipropionate (DIPROLENE) 0.05 % ointment AAA bid hand under occlusion 45 g 3    blood sugar diagnostic Strp 1 strip by Misc.(Non-Drug; Combo Route) route 2 (two) times daily with meals. 100 strip 3     blood-glucose meter kit Use as instructed 1 each 0    cholecalciferol, vitamin D3, 1,000 unit capsule Take 1 capsule (1,000 Units total) by mouth once daily. (Patient taking differently: Take 1,000 Units by mouth every evening. )  0    clindamycin (CLEOCIN T) 1 % lotion AAA face bid 60 mL 3    clobetasol 0.05% (TEMOVATE) 0.05 % Oint AAA hand bid - under occlusion qhs 60 g 3    clopidogrel (PLAVIX) 75 mg tablet Take 1 tablet (75 mg total) by mouth once daily. 30 tablet 11    colchicine 0.6 mg tablet Take 1 tablet (0.6 mg total) by mouth once. 1 tablet 0    diclofenac sodium (SOLARAZE) 3 % gel       fludrocortisone (FLORINEF) 0.1 mg Tab Take 100 mcg by mouth 2 (two) times daily.      gabapentin (NEURONTIN) 600 MG tablet Take 1 tablet (600 mg total) by mouth 2 (two) times daily. 60 tablet 5    ketorolac 0.5% (ACULAR) 0.5 % Drop instill one drop into both eyes every morning  3    lancets Misc 1 lancet by Misc.(Non-Drug; Combo Route) route 2 (two) times daily with meals. 100 each 3    lancing device Misc 1 Device by Misc.(Non-Drug; Combo Route) route 2 (two) times daily with meals. 1 each 0    levETIRAcetam (KEPPRA) 500 MG Tab Take 1 tablet (500 mg total) by mouth 2 (two) times daily. 180 tablet 3    midodrine (PROAMATINE) 5 MG Tab Take 2 tablets (10 mg total) by mouth every Mon, Wed, Fri, Sun. 32 tablet 11    omeprazole (PRILOSEC) 20 MG capsule TAKE 1 CAPSULE BY MOUTH ONCE DAILY 30 capsule 4    polyethylene glycol (GLYCOLAX) 17 gram/dose powder Take 17 g by mouth once daily. Dissolve in juice. 510 g 0    prednisoLONE acetate (PRED FORTE) 1 % DrpS INSTILL ONE DROP INTO  LEFT EYE THREE TIMES A DAY  3    predniSONE (DELTASONE) 10 MG tablet TK 1 T PO ONCE D  2    PREVNAR 13, PF, 0.5 mL Syrg ADM 0.5ML IM UTD  0    DNEISE-LINDA 0.8 mg Tab TK 1 T PO QD  0    RENVELA 800 mg Tab TAKE 1 TABLET BY MOUTH THREE TIMES DAILY WITH MEALS 90 tablet 0    SENSIPAR 30 mg Tab daily with breakfast.       tiZANidine  (ZANAFLEX) 4 MG tablet 1 tablet by mouth every 8-12 hours as needed for muscle spasm 90 tablet 0    warfarin (COUMADIN) 5 MG tablet Take 1 tablet (5 mg total) by mouth Daily. Per [facility] (Patient taking differently: Take 5 mg by mouth every evening. Per [facility]) 90 tablet 3    warfarin (COUMADIN) 5 MG tablet TAKE 1 TABLET BY MOUTH EVERYDAY EXCEPT 1 AND 1/2 TABLETS ON MONDAY AND FRIDAY OR AS DIRECTED BY CLINIC 40 tablet 0     No current facility-administered medications for this visit.        Review of patient's allergies indicates:   Allergen Reactions    Bactrim [sulfamethoxazole-trimethoprim] Other (See Comments)     Causes renal failure    Nsaids (non-steroidal anti-inflammatory drug) Other (See Comments)     Renal failure       Review of Systems   Constitution: Negative for chills and fever.   Cardiovascular: Positive for leg swelling. Negative for chest pain and claudication.   Respiratory: Negative for cough and shortness of breath.    Skin: Positive for dry skin, poor wound healing and suspicious lesions.   Musculoskeletal: Positive for myalgias.   Gastrointestinal: Negative for nausea and vomiting.   Neurological: Positive for numbness and paresthesias.   Psychiatric/Behavioral: Negative for altered mental status.           Objective:      Physical Exam   Constitutional: She is oriented to person, place, and time. She appears well-developed and well-nourished.   HENT:   Head: Normocephalic.   Cardiovascular: Intact distal pulses.    Pedal pulses diminished b/l. Toes cool to touch b/l. 2+ edema noted to b/l LE. No vericosities noted to b/l LEs.      Pulmonary/Chest: No respiratory distress.   Musculoskeletal:   Gastrocnemius equinus noted to b/l ankles with decreased DF noted on exam. MMT 5/5 in DF/PF/Inv/Ev resistance with no reproduction of pain in any direction. Passive range of motion of ankle and pedal joints is painless. Adequate pedal joint ROM.   No pain on Palpation of R great toe  wound.    Feet:   Right Foot:   Protective Sensation: 10 sites tested. 0 sites sensed.   Skin Integrity: Positive for ulcer, skin breakdown and dry skin.   Left Foot:   Protective Sensation: 10 sites tested. 0 sites sensed.   Skin Integrity: Positive for dry skin. Negative for ulcer or skin breakdown.   Neurological: She is alert and oriented to person, place, and time. She has normal strength. A sensory deficit is present.   Light touch, proprioception, and sharp/dull sensation are all intact bilaterally. Protective threshold with the Crary-Wienstein monofilament is completely diminished bilaterally.      Skin: Skin is warm, dry and intact. Lesion noted. No erythema.   Generalized hyperpigmentation to ankle and lower 1/3 of R leg consistent with hemosiderin deposits. Mild dermatitis R lower 1/3 leg. Overall skin is thin, shiny, atrophic to b/l LEs. Loss of pedal hair b/l. Toes are cool to touch b/l.     New ulceration noted to R great toe medial distal aspect.   Measurement: 0.2 x 0.2 x 0.1cm   Base: 100% granular down to dermis only.   Periphery: peeling, loose skin, mildly hyperkeratotic but viable  Undermining/tunelling: none  Drainage: sanguinous only  Erythema: none  Purulence: none  SOI: none    No other open wounds or lesions noted b/l foot.     Diffuse xerosis noted to plantar aspects of b/l foot/heels.      Psychiatric: She has a normal mood and affect. Her behavior is normal. Judgment and thought content normal.   Vitals reviewed.                        Assessment:       Encounter Diagnoses   Name Primary?    Idiopathic peripheral neuropathy Yes    Toe ulcer, right, limited to breakdown of skin          Plan:       Sisi was seen today for idiopathic peripheral neuropathy.    Diagnoses and all orders for this visit:    Idiopathic peripheral neuropathy    Toe ulcer, right, limited to breakdown of skin      I counseled the patient on her conditions, their implications and medical management.    - Shoe  inspection. General Foot Education. Patient reminded of the importance of good nutrition. Patient instructed on proper foot hygeine. We discussed wearing proper shoe gear, daily foot inspections, never walking without protective shoe gear, caution putting sharp instruments to feet     - Discussed general foot care:  Wear comfortable, proper fitting shoes. Wash feet daily. Dry well. After drying, apply moisturizer to feet (no lotion to webspaces). Inspect feet daily for skin breaks, blisters, swelling, or redness. Wear cotton socks (preferably white)  Change socks every day. Do NOT walk barefoot. Do NOT use heating pads or warm/hot water soaks     R hallux wound inspected and treated. Improvement noted in size today. Down to dermis only. Excisional debridement preformed today. See separate procedure note.     No SOI at this time. Applied triple abx ointment and bandaid. Resume sock with supportive athletic style shoe. Will monitor daily for 1 week and return at that time for wound check. Watch for worsening.     D/c HH.     I warned patient to watch for wound recurrence as well as signs and symptoms of infection including redness, drainage, purulence, odor, streaking, fever, chills, etc and I advised her to seek medical attention (ER or urgent car) if these symptoms arise.     RTC 1 week for wound check, sooner PRN

## 2018-05-29 NOTE — PROGRESS NOTES
Quick follow up for subtherapeutic INR on 5/18. INR elevated today. Patient reports bruising from use. She denies any bleeding, bruising or other changes We will hold her coumadin tonight then resume her weekly dose. Follow up in 1.5 weeks. Patient reminded to call coumadin clinic with any changes or concerns.

## 2018-05-29 NOTE — PROCEDURES
"Wound Debridement  Date/Time: 5/29/2018 1:45 PM  Performed by: DOMI CHANG  Authorized by: DOMI CHANG     Time out: Immediately prior to procedure a "time out" was called to verify the correct patient, procedure, equipment, support staff and site/side marked as required.    Consent Done?:  Yes (Verbal)    Preparation: Patient was prepped and draped in usual sterile fashion    Local anesthesia used?: No      Wound Details:    Location:  Right foot    Location:  Right 1st Toe    Type of Debridement:  Excisional       Length (cm):  0.2       Area (sq cm):  0.02       Width (cm):  0.1       Percent Debrided (%):  100       Depth (cm):  0.1       Total Area Debrided (sq cm):  0.02    Depth of debridement:  Epidermis/Dermis    Tissue debrided:  Dermis and Epidermis    Devitalized tissue debrided:  Biofilm and Callus    Instruments:  Curette    Bleeding:  Minimal  Hemostasis Achieved: Yes    Method Used:  Pressure  Patient tolerance:  Patient tolerated the procedure well with no immediate complications      "

## 2018-05-30 ENCOUNTER — CLINICAL SUPPORT (OUTPATIENT)
Dept: REHABILITATION | Facility: HOSPITAL | Age: 58
End: 2018-05-30
Attending: INTERNAL MEDICINE
Payer: MEDICARE

## 2018-05-30 DIAGNOSIS — M62.81 MUSCLE WEAKNESS: ICD-10-CM

## 2018-05-30 DIAGNOSIS — R26.81 GAIT INSTABILITY: ICD-10-CM

## 2018-05-30 PROCEDURE — 97110 THERAPEUTIC EXERCISES: CPT | Mod: PO

## 2018-05-30 PROCEDURE — 97112 NEUROMUSCULAR REEDUCATION: CPT | Mod: PO

## 2018-05-30 NOTE — PROGRESS NOTES
Pt seen by Yesica AWRE. I have reviewed her initial findings and agree with her assessment and plan

## 2018-05-30 NOTE — PROGRESS NOTES
"Name: Sisi Magallanes [facility] Number: [id number]  Date of Treatment: 05/30/2018   Diagnosis:   Encounter Diagnoses   Name Primary?    Muscle weakness     Gait instability        Physician: Carlos A Caputo MD    Time in: 10:00 AM  Time Out: 10:50 AM  Total Treatment Time: 50 min  Last G-code: CK  Last PN: NA  Evaluation Date: 05/14/2018  Visit #: 3 / 8  Authorization period Expiration: 07/14/2018  Plan of Care Expiration: 07/14/2018  Precautions: Standard, O2 dependent       Subjective     Sisi reports she is doing good today, and felt fine following last treatment. Pt reports she continues to have difficulty walking throughout her home.  Patient reports their pain to be 0/10 on a 0-10 scale with 0 being no pain and 10 being the worst pain imaginable.    Objective     Vitals Prior to Tx:   BP: 120/84   HR: 70  O2: 94%    Sisi received therapeutic exercises to develop strength, endurance and ROM for 40 minutes including:   LAQ 2# 2 x 10 each LE  Seated Heel Raises 2 x 10   Seated Marches 2# 2 x 10   Glut Sets 30 x 5" hold   Bridges x 10   Hooklying Abd GTB x 20   Hooklying Add x 20   SLR x 5 each  (NP)  Sit <> Stand x 10  (NP)  Standing Hip Ext 2# x 10   Standing Hip Abd 2# x 10     Patient participated in neuromuscular re-education activities to improve: Balance, Sense and Proprioception for 10 minutes. The following activities were included:   FT on foam 2 x 30"   Tandem on foam 2 x 30"   Step ups on foam 2 x 30"     Written Home Exercises Provided: No, pt instructed to continue with previously issued HEP    Pt educated on new therex, breathing patterns, rest breaks. Pt demo good understanding of the education provided. Sisi demonstrated good return demonstration of activities.     Assessment   Sisi participated in today's treatment session with fair tolerance for therex. Pt with difficulty maintaining appropriate oxygen saturation levels during standing therex, requiring prolonged and " frequent rest breaks. Following one exericse, pt's O2 would drop to 80, requiring ~ 3 - 4 minutes of recovery before returning to acceptable level for exercise. Pt able to complete treatment session with prolonged rest breaks. Pt may benefit from referral to pulmonary rehabilitation to improve endurance and tolerance for physical activity. Will continue to monitor and progress as tolerated.       Pt will continue to benefit from skilled PT intervention. Medical Necessity is demonstrated by:  Fall Risk, Unable to participate in daily activities, Unable to participate fully in daily activities and Weakness.    Patient is making  fair progress towards established goals.    New/Revised goals: none at this time    Short Term GOALS:  In 4 weeks, pt. will:  Pt will perform sit to stand transfer from standard height chair with no use of UEs and < or = min A in order to be independent with transfers.  Pt will increase tinetti socre to 18/28 to improve balance.  Pt will navigate 4 steps with < or = min A with 2 HR in order to improve functional mobility.  Pt will improve B hip flexion strength by 1 grade in order to improve stability with functional tasks.  Pt to tolerate HEP to improve ROM and independence with ADL's     Long term goals  In 8 weeks, pt will:  Pt will be able to perform 5 consecutive sit to stand transfers s/ UE use on first attempts from std ht chair in order to improve transfer skills  Pt will improve Tinetti score to > or = 22/28 in order to improve functional mobility  Pt will navigate 7 steps with < or = min A with 2 HR in order to tolerance to ambulation.  Pt will improve B hip flexion and abduction strength to 4+/5 MMT grade in order to improve stability with upright tasks.  Pt demonstrate independence in HEP and POC to improve ROM and independence with ADL's.      Plan   Continue with established Plan of Care towards PT goals.     Jhoana Villarreal, PT

## 2018-05-31 DIAGNOSIS — N18.6 END STAGE RENAL FAILURE ON DIALYSIS: Primary | ICD-10-CM

## 2018-05-31 DIAGNOSIS — Z99.2 END STAGE RENAL FAILURE ON DIALYSIS: Primary | ICD-10-CM

## 2018-06-04 ENCOUNTER — OFFICE VISIT (OUTPATIENT)
Dept: ORTHOPEDICS | Facility: CLINIC | Age: 58
End: 2018-06-04
Payer: MEDICARE

## 2018-06-04 VITALS
DIASTOLIC BLOOD PRESSURE: 70 MMHG | BODY MASS INDEX: 40.62 KG/M2 | HEART RATE: 72 BPM | WEIGHT: 268 LBS | HEIGHT: 68 IN | SYSTOLIC BLOOD PRESSURE: 103 MMHG

## 2018-06-04 DIAGNOSIS — M79.641 PAIN OF RIGHT HAND: ICD-10-CM

## 2018-06-04 DIAGNOSIS — G62.9 NEUROPATHY: Primary | ICD-10-CM

## 2018-06-04 DIAGNOSIS — L98.491 SKIN ULCER OF HAND, LIMITED TO BREAKDOWN OF SKIN: ICD-10-CM

## 2018-06-04 PROCEDURE — 99999 PR PBB SHADOW E&M-EST. PATIENT-LVL III: CPT | Mod: PBBFAC,,, | Performed by: PHYSICIAN ASSISTANT

## 2018-06-04 PROCEDURE — 3008F BODY MASS INDEX DOCD: CPT | Mod: CPTII,S$GLB,, | Performed by: PHYSICIAN ASSISTANT

## 2018-06-04 PROCEDURE — 99213 OFFICE O/P EST LOW 20 MIN: CPT | Mod: S$GLB,,, | Performed by: PHYSICIAN ASSISTANT

## 2018-06-04 NOTE — PROGRESS NOTES
Subjective:      Patient ID: Sisi Medel is a 57 y.o. female.    Chief Complaint: Pain of the Right Hand      HPI  Sisi Medel is a right hand dominant 57 y.o. female presenting today for follow up of right hand pain and swelling.  At her last visit labs were taken and she did have a mildly elevated uric acid level.  She was place on a renal appropriate dose of Colchicine, per discussion with her PCP.  She says that her pain fluctuates and is mostly located in the thumb and small finger of the right hand.  She states that at its worst pain is 7/10, at its best it is mild and tolerable.  She also reports blistering at the thumbs, right worse than left.  She says that she has been putting an ointment on them.     There was not a history of trauma.  Onset of symptoms began 2-3 months ago. She says that she noticed pain in the right hand and then saw that the hand was swollen.  She denies any left hand pain and swelling.  She noticed discoloration of the thumb, volar wrist, as well as the dorsal forearms.  She is on blood thinners.  She denies any finger numbness or tingling. She reports that the swelling and pain make it hard to use the right hand - she has difficulty opening bottles. She has trouble holding a glass, she is concerned about hand weakness and dropping items. She has pain using her walker.       She does admit to history of gout and diabetes.      Review of patient's allergies indicates:   Allergen Reactions    Bactrim [sulfamethoxazole-trimethoprim] Other (See Comments)     Causes renal failure    Nsaids (non-steroidal anti-inflammatory drug) Other (See Comments)     Renal failure         Current Outpatient Prescriptions   Medication Sig Dispense Refill    ACZONE 5 % topical gel Apply to face every morning 60 g 2    atorvastatin (LIPITOR) 80 MG tablet TAKE 1 TABLET(80 MG) BY MOUTH EVERY NIGHT 90 tablet 3    betamethasone dipropionate (DIPROLENE) 0.05 % ointment AAA bid hand  under occlusion 45 g 3    blood sugar diagnostic Strp 1 strip by Misc.(Non-Drug; Combo Route) route 2 (two) times daily with meals. 100 strip 3    blood-glucose meter kit Use as instructed 1 each 0    cholecalciferol, vitamin D3, 1,000 unit capsule Take 1 capsule (1,000 Units total) by mouth once daily. (Patient taking differently: Take 1,000 Units by mouth every evening. )  0    clindamycin (CLEOCIN T) 1 % lotion AAA face bid 60 mL 3    clobetasol 0.05% (TEMOVATE) 0.05 % Oint AAA hand bid - under occlusion qhs 60 g 3    clopidogrel (PLAVIX) 75 mg tablet Take 1 tablet (75 mg total) by mouth once daily. 30 tablet 11    diclofenac sodium (SOLARAZE) 3 % gel       fludrocortisone (FLORINEF) 0.1 mg Tab Take 100 mcg by mouth 2 (two) times daily.      gabapentin (NEURONTIN) 600 MG tablet Take 1 tablet (600 mg total) by mouth 2 (two) times daily. 60 tablet 5    ketorolac 0.5% (ACULAR) 0.5 % Drop instill one drop into both eyes every morning  3    lancets Misc 1 lancet by Misc.(Non-Drug; Combo Route) route 2 (two) times daily with meals. 100 each 3    lancing device Misc 1 Device by Misc.(Non-Drug; Combo Route) route 2 (two) times daily with meals. 1 each 0    levETIRAcetam (KEPPRA) 500 MG Tab Take 1 tablet (500 mg total) by mouth 2 (two) times daily. 180 tablet 3    omeprazole (PRILOSEC) 20 MG capsule TAKE 1 CAPSULE BY MOUTH ONCE DAILY 30 capsule 4    polyethylene glycol (GLYCOLAX) 17 gram/dose powder Take 17 g by mouth once daily. Dissolve in juice. 510 g 0    prednisoLONE acetate (PRED FORTE) 1 % DrpS INSTILL ONE DROP INTO  LEFT EYE THREE TIMES A DAY  3    predniSONE (DELTASONE) 10 MG tablet TK 1 T PO ONCE D  2    PREVNAR 13, PF, 0.5 mL Syrg ADM 0.5ML IM UTD  0    DENISE-LINDA 0.8 mg Tab TK 1 T PO QD  0    RENVELA 800 mg Tab TAKE 1 TABLET BY MOUTH THREE TIMES DAILY WITH MEALS 90 tablet 0    SENSIPAR 30 mg Tab daily with breakfast.       tiZANidine (ZANAFLEX) 4 MG tablet 1 tablet by mouth every 8-12  hours as needed for muscle spasm 90 tablet 0    warfarin (COUMADIN) 5 MG tablet Take 1 tablet (5 mg total) by mouth Daily. [facility] (Patient taking differently: Take 5 mg by mouth every evening. Per [facility]) 90 tablet 3    warfarin (COUMADIN) 5 MG tablet TAKE 1 TABLET BY MOUTH EVERYDAY EXCEPT 1 AND 1/2 TABLETS ON MONDAY AND FRIDAY OR AS DIRECTED BY CLINIC 40 tablet 0    colchicine 0.6 mg tablet Take 1 tablet (0.6 mg total) by mouth once. 1 tablet 0    midodrine (PROAMATINE) 5 MG Tab Take 2 tablets (10 mg total) by mouth every Mon, Wed, Fri, Sun. 32 tablet 11     No current facility-administered medications for this visit.        Past Medical History:   Diagnosis Date    Anemia in ESRD (end-stage renal disease) 3/7/2016    Anticoagulant long-term use     Cervical radiculopathy 7/20/2015    CHF (congestive heart failure)     Chronic combined systolic and diastolic heart failure 6/14/2013    EF 20% 2013, improved with Medical therapy, negative PET 2013    Chronic respiratory failure with hypoxia 04/22/2013    On home oxygen at 2-5 liters    Closed fracture of proximal end of right fibula 6/27/2016    Complications due to renal dialysis device, implant, and graft     DDD (degenerative disc disease), lumbar 6/17/2015    Dependence on renal dialysis     Diabetes mellitus type II, controlled 12/8/2017    ESRD on hemodialysis 2/23/2016    Essential hypertension     Gout     Lateral meniscal tear 5/31/2016    Lumbar stenosis 6/17/2015    Pacemaker     Paroxysmal atrial fibrillation 5/16/2014    Not on anticoagulation    Peripheral neuropathy 11/13/2013    Personal history of gastric ulcer 3/19/2013    Sarcoidosis, lung 2009    Diagnosed in 2009 with ocular manifestation, on 4L home O2 and PO steroids    Secondary pulmonary hypertension 4/7/2015    Seizure disorder 3/19/2013    Seizures     Shingles 11/13/2013    Thyroid disease        Past Surgical History:   Procedure  "Laterality Date    ABDOMINAL SURGERY      CARDIAC PACEMAKER PLACEMENT       SECTION      x2    OTHER SURGICAL HISTORY      loop recorder implant    stent to fistula      VASCULAR SURGERY      fistula to L upper arm          Review of Systems:  Review of Systems   Constitution: Negative for chills and fever.   Skin: Positive for color change (see HPI). Negative for rash and suspicious lesions.   Musculoskeletal:        See HPI   Neurological: Positive for dizziness (orthostatic ). Negative for headaches, light-headedness, numbness and paresthesias.   Psychiatric/Behavioral: Negative for depression. The patient is not nervous/anxious.          OBJECTIVE:     PHYSICAL EXAM:  Height: 5' 8" (172.7 cm) Weight: 121.6 kg (268 lb)  Vitals:    18 1055   BP: 103/70   Pulse: 72   Weight: 121.6 kg (268 lb)   Height: 5' 8" (1.727 m)   PainSc:   4   PainLoc: Hand     General    Vitals reviewed.  Constitutional: She is oriented to person, place, and time. She appears well-developed and well-nourished.   Patient is on supplemental oxygen;  She uses a walker   HENT:   Head: Normocephalic and atraumatic.   Neck: Normal range of motion.   Cardiovascular: Normal rate.    Pulmonary/Chest: Effort normal. No respiratory distress.   Neurological: She is alert and oriented to person, place, and time.   Psychiatric: She has a normal mood and affect. Her behavior is normal. Judgment and thought content normal.           Musculoskeletal: Mild edema of the right hand.  Moderate edema with hyperpigmentation of the volar right wrist.  Hyperpigmentation also noted at the first webspace on the right and bilaterally over the dorsal aspect of the forearms-possible ecchymosis.  Ulcerations noted bilaterally at the 1st web space, also a small carbuncle noticed on the dorsal aspect of the right thumb.  No erythema or increased warmth of the skin noted, no discharge.   Decreased range of motion of the right fingers, patient reports " pain with motion.  Chest reports pain with hyperflexion of the right wrist.  Neurovascular intact-decreased sensation in the right radial, ulnar, and median distributions compared to the left.  Good motor function.  Good capillary refill and 2+ radial pulses.  Negative Tinel's and Blanche's bilaterally.      RADIOGRAPHS:  Right Hand X-Ray, 5/14/18  FINDINGS:  Bones are intact.  There is no evidence for acute fracture or bone destruction. There are mild degenerative changes within the small joints of the hand, most pronounced at the interphalangeal joint of the right thumb.  No bony erosions are identified.  There is no evidence for dislocation.  Soft tissues appear unremarkable.   Impression   Degenerative changes, most pronounced at the right 1st interphalangeal joint.  No evidence for acute fracture, bone destruction, or dislocation.     Comments: I have personally reviewed the imaging and I agree with the above radiologist's report.    ASSESSMENT/PLAN:   Sisi was seen today for pain.    Diagnoses and all orders for this visit:    Neuropathy  -     EMG W/ ULTRASOUND AND NERVE CONDUCTION TEST 1 Extremity; Future    Pain of right hand  -     EMG W/ ULTRASOUND AND NERVE CONDUCTION TEST 1 Extremity; Future    Skin ulcer of hand, limited to breakdown of skin        - Medihoney applied to skin ulcers in the 1st web space bilaterally, patient instructed on wound care. She will start warm compresses for the carbuncle, call if she notices surrounding erythema or pain.   - EMG ordered and scheduled  - follow-up after EMG to discuss results and plan  - call with questions or concerns    Disclaimer: This note has been generated using voice-recognition software. There may be typographical errors that have been missed during proof-reading.

## 2018-06-05 ENCOUNTER — OFFICE VISIT (OUTPATIENT)
Dept: PODIATRY | Facility: CLINIC | Age: 58
End: 2018-06-05
Payer: MEDICARE

## 2018-06-05 VITALS
WEIGHT: 268 LBS | DIASTOLIC BLOOD PRESSURE: 74 MMHG | SYSTOLIC BLOOD PRESSURE: 108 MMHG | HEIGHT: 68 IN | HEART RATE: 69 BPM | BODY MASS INDEX: 40.62 KG/M2

## 2018-06-05 DIAGNOSIS — G60.9 IDIOPATHIC PERIPHERAL NEUROPATHY: Primary | ICD-10-CM

## 2018-06-05 DIAGNOSIS — L97.512 TOE ULCER, RIGHT, WITH FAT LAYER EXPOSED: ICD-10-CM

## 2018-06-05 DIAGNOSIS — R60.0 BILATERAL LOWER EXTREMITY EDEMA: ICD-10-CM

## 2018-06-05 PROCEDURE — 99213 OFFICE O/P EST LOW 20 MIN: CPT | Mod: 25,S$GLB,, | Performed by: PODIATRIST

## 2018-06-05 PROCEDURE — 3008F BODY MASS INDEX DOCD: CPT | Mod: CPTII,S$GLB,, | Performed by: PODIATRIST

## 2018-06-05 PROCEDURE — 99999 PR PBB SHADOW E&M-EST. PATIENT-LVL III: CPT | Mod: PBBFAC,,, | Performed by: PODIATRIST

## 2018-06-05 PROCEDURE — 11042 DBRDMT SUBQ TIS 1ST 20SQCM/<: CPT | Mod: S$GLB,,, | Performed by: PODIATRIST

## 2018-06-05 NOTE — PROGRESS NOTES
Subjective:      Patient ID: Sisi Medel is a 57 y.o. female.    Chief Complaint: Idiopathic peripheral neuropathy (rt foot itch)    Sisi is a 57 y.o. female who presents to the clinic for evaluation and treatment of high risk feet. Sisi has a past medical history of Anemia in ESRD (end-stage renal disease) (3/7/2016); Anticoagulant long-term use; Cervical radiculopathy (7/20/2015); CHF (congestive heart failure); Chronic combined systolic and diastolic heart failure (6/14/2013); Chronic respiratory failure with hypoxia (04/22/2013); Closed fracture of proximal end of right fibula (6/27/2016); Complications due to renal dialysis device, implant, and graft; DDD (degenerative disc disease), lumbar (6/17/2015); Dependence on renal dialysis; Diabetes mellitus type II, controlled (12/8/2017); ESRD on hemodialysis (2/23/2016); Essential hypertension; Gout; Lateral meniscal tear (5/31/2016); Lumbar stenosis (6/17/2015); Pacemaker; Paroxysmal atrial fibrillation (5/16/2014); Peripheral neuropathy (11/13/2013); Personal history of gastric ulcer (3/19/2013); Sarcoidosis, lung (2009); Secondary pulmonary hypertension (4/7/2015); Seizure disorder (3/19/2013); Seizures; Shingles (11/13/2013); and Thyroid disease. weekly follow up for R great toe ulceration. Almost healed last week but patient has been getting wet a lot and relates that wound has opened and become bigger. No pain, no other problems.   This patient has documented high risk feet requiring routine maintenance secondary to peripheral neuropathy.        PCP: Carlos A Caputo MD    Date Last Seen by PCP:   Chief Complaint   Patient presents with    Idiopathic peripheral neuropathy     rt foot itch         Current shoe gear:  Affected Foot: Slip-on shoes     Unaffected Foot: Slip-on shoes    Last encounter in this department: Visit date not found    Hemoglobin A1C   Date Value Ref Range Status   01/02/2018 6.6 (H) 4.0 - 5.6 % Final     Comment:      According to ADA guidelines, hemoglobin A1c <7.0% represents  optimal control in non-pregnant diabetic patients. Different  metrics may apply to specific patient populations.   Standards of Medical Care in Diabetes-2016.  For the purpose of screening for the presence of diabetes:  <5.7%     Consistent with the absence of diabetes  5.7-6.4%  Consistent with increasing risk for diabetes   (prediabetes)  >or=6.5%  Consistent with diabetes  Currently, no consensus exists for use of hemoglobin A1c  for diagnosis of diabetes for children.  This Hemoglobin A1c assay has significant interference with fetal   hemoglobin   (HbF). The results are invalid for patients with abnormal amounts of   HbF,   including those with known Hereditary Persistence   of Fetal Hemoglobin. Heterozygous hemoglobin variants (HbAS, HbAC,   HbAD, HbAE, HbA2) do not significantly interfere with this assay;   however, presence of multiple variants in a sample may impact the %   interference.     12/07/2017 6.5 (H) 4.0 - 5.6 % Final     Comment:     According to ADA guidelines, hemoglobin A1c <7.0% represents  optimal control in non-pregnant diabetic patients. Different  metrics may apply to specific patient populations.   Standards of Medical Care in Diabetes-2016.  For the purpose of screening for the presence of diabetes:  <5.7%     Consistent with the absence of diabetes  5.7-6.4%  Consistent with increasing risk for diabetes   (prediabetes)  >or=6.5%  Consistent with diabetes  Currently, no consensus exists for use of hemoglobin A1c  for diagnosis of diabetes for children.  This Hemoglobin A1c assay has significant interference with fetal   hemoglobin   (HbF). The results are invalid for patients with abnormal amounts of   HbF,   including those with known Hereditary Persistence   of Fetal Hemoglobin. Heterozygous hemoglobin variants (HbAS, HbAC,   HbAD, HbAE, HbA2) do not significantly interfere with this assay;   however, presence of multiple  variants in a sample may impact the %   interference.     09/06/2017 6.8 (H) 4.0 - 5.6 % Final     Comment:     According to ADA guidelines, hemoglobin A1c <7.0% represents  optimal control in non-pregnant diabetic patients. Different  metrics may apply to specific patient populations.   Standards of Medical Care in Diabetes-2016.  For the purpose of screening for the presence of diabetes:  <5.7%     Consistent with the absence of diabetes  5.7-6.4%  Consistent with increasing risk for diabetes   (prediabetes)  >or=6.5%  Consistent with diabetes  Currently, no consensus exists for use of hemoglobin A1c  for diagnosis of diabetes for children.  This Hemoglobin A1c assay has significant interference with fetal   hemoglobin   (HbF). The results are invalid for patients with abnormal amounts of   HbF,   including those with known Hereditary Persistence   of Fetal Hemoglobin. Heterozygous hemoglobin variants (HbAS, HbAC,   HbAD, HbAE, HbA2) do not significantly interfere with this assay;   however, presence of multiple variants in a sample may impact the %   interference.       Past Medical History:   Diagnosis Date    Anemia in ESRD (end-stage renal disease) 3/7/2016    Anticoagulant long-term use     Cervical radiculopathy 7/20/2015    CHF (congestive heart failure)     Chronic combined systolic and diastolic heart failure 6/14/2013    EF 20% 2013, improved with Medical therapy, negative PET 2013    Chronic respiratory failure with hypoxia 04/22/2013    On home oxygen at 2-5 liters    Closed fracture of proximal end of right fibula 6/27/2016    Complications due to renal dialysis device, implant, and graft     DDD (degenerative disc disease), lumbar 6/17/2015    Dependence on renal dialysis     Diabetes mellitus type II, controlled 12/8/2017    ESRD on hemodialysis 2/23/2016    Essential hypertension     Gout     Lateral meniscal tear 5/31/2016    Lumbar stenosis 6/17/2015    Pacemaker     Paroxysmal  atrial fibrillation 2014    Not on anticoagulation    Peripheral neuropathy 2013    Personal history of gastric ulcer 3/19/2013    Sarcoidosis, lung 2009    Diagnosed in  with ocular manifestation, on 4L home O2 and PO steroids    Secondary pulmonary hypertension 2015    Seizure disorder 3/19/2013    Seizures     Shingles 2013    Thyroid disease        Past Surgical History:   Procedure Laterality Date    ABDOMINAL SURGERY      CARDIAC PACEMAKER PLACEMENT       SECTION      x2    OTHER SURGICAL HISTORY      loop recorder implant    stent to fistula      VASCULAR SURGERY      fistula to L upper arm        Family History   Problem Relation Age of Onset    Kidney failure Mother     Hypertension Mother     Diabetes Mother     Coronary artery disease Father     Hypertension Father     Diabetes Father     Lupus Sister     Diabetes Maternal Grandmother     Colon cancer Neg Hx     Ovarian cancer Neg Hx     Breast cancer Neg Hx        Social History     Social History    Marital status: Single     Spouse name: N/A    Number of children: N/A    Years of education: N/A     Social History Main Topics    Smoking status: Former Smoker     Packs/day: 0.50     Years: 10.00     Types: Cigarettes     Quit date: 1994    Smokeless tobacco: Never Used    Alcohol use No      Comment: rarely    Drug use: No    Sexual activity: Not Currently     Other Topics Concern    Are You Pregnant Or Think You May Be? No    Breast-Feeding No     Social History Narrative    Lives with boyfriend/partner who helps with her care.            Current Outpatient Prescriptions   Medication Sig Dispense Refill    ACZONE 5 % topical gel Apply to face every morning 60 g 2    atorvastatin (LIPITOR) 80 MG tablet TAKE 1 TABLET(80 MG) BY MOUTH EVERY NIGHT 90 tablet 3    betamethasone dipropionate (DIPROLENE) 0.05 % ointment AAA bid hand under occlusion 45 g 3    blood sugar diagnostic Strp  1 strip by Misc.(Non-Drug; Combo Route) route 2 (two) times daily with meals. 100 strip 3    blood-glucose meter kit Use as instructed 1 each 0    cholecalciferol, vitamin D3, 1,000 unit capsule Take 1 capsule (1,000 Units total) by mouth once daily. (Patient taking differently: Take 1,000 Units by mouth every evening. )  0    clindamycin (CLEOCIN T) 1 % lotion AAA face bid 60 mL 3    clobetasol 0.05% (TEMOVATE) 0.05 % Oint AAA hand bid - under occlusion qhs 60 g 3    clopidogrel (PLAVIX) 75 mg tablet Take 1 tablet (75 mg total) by mouth once daily. 30 tablet 11    colchicine 0.6 mg tablet Take 1 tablet (0.6 mg total) by mouth once. 1 tablet 0    diclofenac sodium (SOLARAZE) 3 % gel       fludrocortisone (FLORINEF) 0.1 mg Tab Take 100 mcg by mouth 2 (two) times daily.      gabapentin (NEURONTIN) 600 MG tablet Take 1 tablet (600 mg total) by mouth 2 (two) times daily. 60 tablet 5    ketorolac 0.5% (ACULAR) 0.5 % Drop instill one drop into both eyes every morning  3    lancets Misc 1 lancet by Misc.(Non-Drug; Combo Route) route 2 (two) times daily with meals. 100 each 3    lancing device Misc 1 Device by Misc.(Non-Drug; Combo Route) route 2 (two) times daily with meals. 1 each 0    levETIRAcetam (KEPPRA) 500 MG Tab Take 1 tablet (500 mg total) by mouth 2 (two) times daily. 180 tablet 3    midodrine (PROAMATINE) 5 MG Tab Take 2 tablets (10 mg total) by mouth every Mon, Wed, Fri, Sun. 32 tablet 11    omeprazole (PRILOSEC) 20 MG capsule TAKE 1 CAPSULE BY MOUTH ONCE DAILY 30 capsule 4    polyethylene glycol (GLYCOLAX) 17 gram/dose powder Take 17 g by mouth once daily. Dissolve in juice. 510 g 0    prednisoLONE acetate (PRED FORTE) 1 % DrpS INSTILL ONE DROP INTO  LEFT EYE THREE TIMES A DAY  3    predniSONE (DELTASONE) 10 MG tablet TK 1 T PO ONCE D  2    PREVNAR 13, PF, 0.5 mL Syrg ADM 0.5ML IM UTD  0    DENISE-LINDA 0.8 mg Tab TK 1 T PO QD  0    RENVELA 800 mg Tab TAKE 1 TABLET BY MOUTH THREE TIMES DAILY  WITH MEALS 90 tablet 0    SENSIPAR 30 mg Tab daily with breakfast.       tiZANidine (ZANAFLEX) 4 MG tablet 1 tablet by mouth every 8-12 hours as needed for muscle spasm 90 tablet 0    warfarin (COUMADIN) 5 MG tablet Take 1 tablet (5 mg total) by mouth Daily. [facility] (Patient taking differently: Take 5 mg by mouth every evening. Per [facility]) 90 tablet 3    warfarin (COUMADIN) 5 MG tablet TAKE 1 TABLET BY MOUTH EVERYDAY EXCEPT 1 AND 1/2 TABLETS ON MONDAY AND FRIDAY OR AS DIRECTED BY CLINIC 40 tablet 0     No current facility-administered medications for this visit.        Review of patient's allergies indicates:   Allergen Reactions    Bactrim [sulfamethoxazole-trimethoprim] Other (See Comments)     Causes renal failure    Nsaids (non-steroidal anti-inflammatory drug) Other (See Comments)     Renal failure       Review of Systems   Constitution: Negative for chills and fever.   Cardiovascular: Positive for leg swelling. Negative for chest pain and claudication.   Respiratory: Negative for cough and shortness of breath.    Skin: Positive for dry skin, poor wound healing and suspicious lesions.   Musculoskeletal: Positive for myalgias.   Gastrointestinal: Negative for nausea and vomiting.   Neurological: Positive for numbness and paresthesias.   Psychiatric/Behavioral: Negative for altered mental status.           Objective:      Physical Exam   Constitutional: She is oriented to person, place, and time. She appears well-developed and well-nourished.   HENT:   Head: Normocephalic.   Cardiovascular: Intact distal pulses.    Pedal pulses diminished b/l. Toes cool to touch b/l. 2+ edema noted to b/l LE. No vericosities noted to b/l LEs.      Pulmonary/Chest: No respiratory distress.   Musculoskeletal:   Gastrocnemius equinus noted to b/l ankles with decreased DF noted on exam. MMT 5/5 in DF/PF/Inv/Ev resistance with no reproduction of pain in any direction. Passive range of motion of ankle and  pedal joints is painless. Adequate pedal joint ROM.   No pain on Palpation of R great toe wound.    Feet:   Right Foot:   Protective Sensation: 10 sites tested. 0 sites sensed.   Skin Integrity: Positive for ulcer, skin breakdown and dry skin.   Left Foot:   Protective Sensation: 10 sites tested. 0 sites sensed.   Skin Integrity: Positive for dry skin. Negative for ulcer or skin breakdown.   Neurological: She is alert and oriented to person, place, and time. She has normal strength. A sensory deficit is present.   Light touch, proprioception, and sharp/dull sensation are all intact bilaterally. Protective threshold with the Shamrock-Wienstein monofilament is completely diminished bilaterally.      Skin: Skin is warm, dry and intact. Lesion noted. No erythema.   Generalized hyperpigmentation to ankle and lower 1/3 of R leg consistent with hemosiderin deposits. Mild dermatitis R lower 1/3 leg. Overall skin is thin, shiny, atrophic to b/l LEs. Loss of pedal hair b/l. Toes are cool to touch b/l.     New ulceration noted to R great toe medial distal aspect.   Measurement: 0.3 x 0.2 x 0.1cm   Base: 100% granular down to dermis only.   Periphery: macerated and slightly hyperkeratotic but viable  Undermining/tunelling: none  Drainage: sanguinous only  Erythema: none  Purulence: none  SOI: none    No other open wounds or lesions noted b/l foot.     Diffuse xerosis noted to plantar aspects of b/l foot/heels.      Psychiatric: She has a normal mood and affect. Her behavior is normal. Judgment and thought content normal.   Vitals reviewed.        Assessment:       Encounter Diagnoses   Name Primary?    Idiopathic peripheral neuropathy Yes    Bilateral lower extremity edema     Toe ulcer, right, with fat layer exposed          Plan:       Sisi was seen today for idiopathic peripheral neuropathy.    Diagnoses and all orders for this visit:    Idiopathic peripheral neuropathy    Bilateral lower extremity edema    Toe ulcer,  right, with fat layer exposed      I counseled the patient on her conditions, their implications and medical management.    - Shoe inspection. General Foot Education. Patient reminded of the importance of good nutrition. Patient instructed on proper foot hygeine. We discussed wearing proper shoe gear, daily foot inspections, never walking without protective shoe gear, caution putting sharp instruments to feet     - Discussed general foot care:  Wear comfortable, proper fitting shoes. Wash feet daily. Dry well. After drying, apply moisturizer to feet (no lotion to webspaces). Inspect feet daily for skin breaks, blisters, swelling, or redness. Wear cotton socks (preferably white)  Change socks every day. Do NOT walk barefoot. Do NOT use heating pads or warm/hot water soaks     R hallux wound inspected and treated. Slightly bigger in size today, back down to subcutaneous tissue layer. Excisional debridement preformed today. See separate procedure note.     No SOI at this time. Gentian violet to periphery, [place] to wound base, wound foam football dressing today. Back in Darco shoe for ambulation.     No SOI at this time.     I warned patient to watch for wound recurrence as well as signs and symptoms of infection including redness, drainage, purulence, odor, streaking, fever, chills, etc and I advised her to seek medical attention (ER or urgent car) if these symptoms arise.     RTC 1 week for wound check, sooner PRN

## 2018-06-05 NOTE — PROCEDURES
"Wound Debridement  Date/Time: 6/5/2018 1:42 PM  Performed by: DOMI CHANG  Authorized by: DOMI CHANG     Time out: Immediately prior to procedure a "time out" was called to verify the correct patient, procedure, equipment, support staff and site/side marked as required.    Consent Done?:  Yes (Verbal)    Preparation: Patient was prepped and draped in usual sterile fashion    Local anesthesia used?: No      Wound Details:    Location:  Right foot    Location:  Right 1st Toe    Type of Debridement:  Excisional       Length (cm):  0.3       Area (sq cm):  0.06       Width (cm):  0.2       Percent Debrided (%):  100       Depth (cm):  0.1       Total Area Debrided (sq cm):  0.06    Depth of debridement:  Subcutaneous tissue    Tissue debrided:  Dermis, Epidermis and Subcutaneous    Devitalized tissue debrided:  Callus, Biofilm and Sough    Instruments:  Curette    Bleeding:  Minimal  Hemostasis Achieved: Yes    Method Used:  Pressure  Patient tolerance:  Patient tolerated the procedure well with no immediate complications      "

## 2018-06-11 DIAGNOSIS — I48.0 PAROXYSMAL ATRIAL FIBRILLATION: ICD-10-CM

## 2018-06-11 DIAGNOSIS — Z79.01 LONG TERM CURRENT USE OF ANTICOAGULANT THERAPY: ICD-10-CM

## 2018-06-11 RX ORDER — WARFARIN SODIUM 5 MG/1
TABLET ORAL
Qty: 40 TABLET | Refills: 0 | Status: SHIPPED | OUTPATIENT
Start: 2018-06-11 | End: 2018-06-11 | Stop reason: SDUPTHER

## 2018-06-11 RX ORDER — WARFARIN SODIUM 5 MG/1
TABLET ORAL
Qty: 102 TABLET | Refills: 0 | Status: SHIPPED | OUTPATIENT
Start: 2018-06-11 | End: 2018-01-01 | Stop reason: SDUPTHER

## 2018-06-12 ENCOUNTER — OFFICE VISIT (OUTPATIENT)
Dept: PODIATRY | Facility: CLINIC | Age: 58
End: 2018-06-12
Payer: MEDICARE

## 2018-06-12 DIAGNOSIS — R60.0 BILATERAL LOWER EXTREMITY EDEMA: ICD-10-CM

## 2018-06-12 DIAGNOSIS — G60.9 IDIOPATHIC PERIPHERAL NEUROPATHY: Primary | ICD-10-CM

## 2018-06-12 DIAGNOSIS — L97.512 TOE ULCER, RIGHT, WITH FAT LAYER EXPOSED: ICD-10-CM

## 2018-06-12 PROCEDURE — 99499 UNLISTED E&M SERVICE: CPT | Mod: S$GLB,,, | Performed by: PODIATRIST

## 2018-06-12 PROCEDURE — 11042 DBRDMT SUBQ TIS 1ST 20SQCM/<: CPT | Mod: S$GLB,,, | Performed by: PODIATRIST

## 2018-06-12 NOTE — PROCEDURES
"Wound Debridement  Date/Time: 6/12/2018 1:47 PM  Performed by: DOMI CHANG  Authorized by: DOMI CHANG     Time out: Immediately prior to procedure a "time out" was called to verify the correct patient, procedure, equipment, support staff and site/side marked as required.    Consent Done?:  Yes (Verbal)    Preparation: Patient was prepped and draped in usual sterile fashion    Local anesthesia used?: No      Wound Details:    Location:  Right foot    Location:  Right 1st Toe    Type of Debridement:  Excisional       Length (cm):  0.2       Area (sq cm):  0.02       Width (cm):  0.1       Percent Debrided (%):  100       Depth (cm):  0.1       Total Area Debrided (sq cm):  0.02    Depth of debridement:  Subcutaneous tissue    Tissue debrided:  Dermis, Epidermis and Subcutaneous    Devitalized tissue debrided:  Biofilm and Callus    Instruments:  Curette    Bleeding:  Minimal  Hemostasis Achieved: Yes    Method Used:  Pressure  Patient tolerance:  Patient tolerated the procedure well with no immediate complications      "

## 2018-06-13 ENCOUNTER — ANTI-COAG VISIT (OUTPATIENT)
Dept: CARDIOLOGY | Facility: CLINIC | Age: 58
End: 2018-06-13
Payer: MEDICARE

## 2018-06-13 ENCOUNTER — CLINICAL SUPPORT (OUTPATIENT)
Dept: DIABETES | Facility: CLINIC | Age: 58
End: 2018-06-13
Payer: MEDICARE

## 2018-06-13 ENCOUNTER — LAB VISIT (OUTPATIENT)
Dept: LAB | Facility: HOSPITAL | Age: 58
End: 2018-06-13
Payer: MEDICARE

## 2018-06-13 DIAGNOSIS — E11.8 TYPE 2 DIABETES MELLITUS WITH COMPLICATION, WITHOUT LONG-TERM CURRENT USE OF INSULIN: Primary | ICD-10-CM

## 2018-06-13 DIAGNOSIS — E11.8 TYPE 2 DIABETES MELLITUS WITH COMPLICATION, WITHOUT LONG-TERM CURRENT USE OF INSULIN: ICD-10-CM

## 2018-06-13 DIAGNOSIS — Z79.01 CURRENT USE OF LONG TERM ANTICOAGULATION: Primary | ICD-10-CM

## 2018-06-13 DIAGNOSIS — E11.8 CONTROLLED TYPE 2 DIABETES MELLITUS WITH COMPLICATION, WITHOUT LONG-TERM CURRENT USE OF INSULIN: ICD-10-CM

## 2018-06-13 LAB
ESTIMATED AVG GLUCOSE: 166 MG/DL
HBA1C MFR BLD HPLC: 7.4 %
INR PPP: 2.3 (ref 2–3)

## 2018-06-13 PROCEDURE — 36415 COLL VENOUS BLD VENIPUNCTURE: CPT

## 2018-06-13 PROCEDURE — G0108 DIAB MANAGE TRN  PER INDIV: HCPCS | Mod: S$GLB,,, | Performed by: DIETITIAN, REGISTERED

## 2018-06-13 PROCEDURE — 85610 PROTHROMBIN TIME: CPT | Mod: QW,S$GLB,, | Performed by: INTERNAL MEDICINE

## 2018-06-13 PROCEDURE — 99211 OFF/OP EST MAY X REQ PHY/QHP: CPT | Mod: 25,S$GLB,,

## 2018-06-13 PROCEDURE — 83036 HEMOGLOBIN GLYCOSYLATED A1C: CPT

## 2018-06-13 NOTE — PROGRESS NOTES
Quick follow-up for slightly elevated INR 5/29. INR within normal range today. Patient with bruises from use, denies any bleeding or changes. Will maintain weekly dose until follow-up in 2 weeks. Advised her to call with any changes or concerns.

## 2018-06-14 NOTE — PROGRESS NOTES
"Result released and message sent via [facility] but patient isnt' checking this.  Please call her with message as noted below:  "A1c (which gives a sense of average blood sugars over the past 3 months) is above goal, and higher than it has been in the past -- it's a pretty big jump from 5 months ago.    I would recommend working on being more careful about carbs and sweets in your diet, and check your blood sugar more regularly. If you're consistently running high (fasting blood sugar above 140, nonfasting blood sugar above 180-200) then we should consider starting a medication. Please make sure to bring in your blood sugar log to the next visit!    Please contact me if you have any questions about your results, or if you want to go ahead and start on a medication for diabetes.  aCrlos A Caputo MD""

## 2018-06-15 ENCOUNTER — TELEPHONE (OUTPATIENT)
Dept: INTERNAL MEDICINE | Facility: CLINIC | Age: 58
End: 2018-06-15

## 2018-06-15 NOTE — TELEPHONE ENCOUNTER
"----- Message from Carlos A Caputo MD sent at 6/14/2018  6:23 PM CDT -----  Result released and message sent via [facility] but patient isnt' checking this.  Please call her with message as noted below:  "A1c (which gives a sense of average blood sugars over the past 3 months) is above goal, and higher than it has been in the past -- it's a pretty big jump from 5 months ago.    I would recommend working on being more careful about carbs and sweets in your diet, and check your blood sugar more regularly. If you're consistently running high (fasting blood sugar above 140, nonfasting blood sugar above 180-200) then we should consider starting a medication. Please make sure to bring in your blood sugar log to the next visit!    Please contact me if you have any questions about your results, or if you want to go ahead and start on a medication for diabetes.  Carlos A Caputo MD"  "

## 2018-06-15 NOTE — TELEPHONE ENCOUNTER
----- Message from Kayy Avalos sent at 6/15/2018  8:13 AM CDT -----  Contact: self/[phone]  Patient is returning a phone call.  Who left a message for the patient: Rema  Does patient know what this is regarding:  no  Comments: thank you

## 2018-06-18 ENCOUNTER — OFFICE VISIT (OUTPATIENT)
Dept: PULMONOLOGY | Facility: CLINIC | Age: 58
End: 2018-06-18
Payer: MEDICARE

## 2018-06-18 VITALS
HEART RATE: 70 BPM | HEIGHT: 68 IN | RESPIRATION RATE: 16 BRPM | DIASTOLIC BLOOD PRESSURE: 74 MMHG | WEIGHT: 267.19 LBS | BODY MASS INDEX: 40.5 KG/M2 | OXYGEN SATURATION: 92 % | SYSTOLIC BLOOD PRESSURE: 128 MMHG

## 2018-06-18 DIAGNOSIS — E66.01 MORBID OBESITY WITH BMI OF 40.0-44.9, ADULT: Chronic | ICD-10-CM

## 2018-06-18 DIAGNOSIS — D86.0 PULMONARY SARCOIDOSIS: Primary | Chronic | ICD-10-CM

## 2018-06-18 DIAGNOSIS — N18.6 ESRD (END STAGE RENAL DISEASE) ON DIALYSIS: ICD-10-CM

## 2018-06-18 DIAGNOSIS — Z99.2 ESRD (END STAGE RENAL DISEASE) ON DIALYSIS: ICD-10-CM

## 2018-06-18 DIAGNOSIS — J98.4 CRLD (CHRONIC RESTRICTIVE LUNG DISEASE): ICD-10-CM

## 2018-06-18 DIAGNOSIS — I50.42 CHRONIC COMBINED SYSTOLIC AND DIASTOLIC HEART FAILURE: Chronic | ICD-10-CM

## 2018-06-18 PROCEDURE — 3008F BODY MASS INDEX DOCD: CPT | Mod: CPTII,S$GLB,, | Performed by: INTERNAL MEDICINE

## 2018-06-18 PROCEDURE — 99999 PR PBB SHADOW E&M-EST. PATIENT-LVL III: CPT | Mod: PBBFAC,,, | Performed by: INTERNAL MEDICINE

## 2018-06-18 PROCEDURE — 99214 OFFICE O/P EST MOD 30 MIN: CPT | Mod: S$GLB,,, | Performed by: INTERNAL MEDICINE

## 2018-06-18 RX ORDER — PREDNISONE 10 MG/1
20 TABLET ORAL DAILY
Qty: 20 TABLET | Refills: 0 | Status: SHIPPED | OUTPATIENT
Start: 2018-06-18 | End: 2018-01-01

## 2018-06-19 ENCOUNTER — OFFICE VISIT (OUTPATIENT)
Dept: PODIATRY | Facility: CLINIC | Age: 58
End: 2018-06-19
Payer: MEDICARE

## 2018-06-19 VITALS
WEIGHT: 267 LBS | BODY MASS INDEX: 40.47 KG/M2 | HEART RATE: 69 BPM | SYSTOLIC BLOOD PRESSURE: 100 MMHG | HEIGHT: 68 IN | DIASTOLIC BLOOD PRESSURE: 76 MMHG

## 2018-06-19 DIAGNOSIS — R60.0 BILATERAL LOWER EXTREMITY EDEMA: ICD-10-CM

## 2018-06-19 DIAGNOSIS — E11.49 TYPE II DIABETES MELLITUS WITH NEUROLOGICAL MANIFESTATIONS: Primary | ICD-10-CM

## 2018-06-19 DIAGNOSIS — L97.512 TOE ULCER, RIGHT, WITH FAT LAYER EXPOSED: ICD-10-CM

## 2018-06-19 PROCEDURE — 99999 PR PBB SHADOW E&M-EST. PATIENT-LVL III: CPT | Mod: PBBFAC,,, | Performed by: PODIATRIST

## 2018-06-19 PROCEDURE — 99499 UNLISTED E&M SERVICE: CPT | Mod: S$GLB,,, | Performed by: PODIATRIST

## 2018-06-19 PROCEDURE — 11042 DBRDMT SUBQ TIS 1ST 20SQCM/<: CPT | Mod: S$GLB,,, | Performed by: PODIATRIST

## 2018-06-19 NOTE — PROCEDURES
"Wound Debridement  Date/Time: 6/19/2018 4:21 PM  Performed by: DOMI CHANG  Authorized by: DOMI CHANG     Time out: Immediately prior to procedure a "time out" was called to verify the correct patient, procedure, equipment, support staff and site/side marked as required.    Consent Done?:  Yes (Verbal)    Preparation: Patient was prepped and draped in usual sterile fashion    Local anesthesia used?: No      Wound Details:    Location:  Right foot    Location:  Right 1st Toe    Type of Debridement:  Excisional       Length (cm):  0.2       Area (sq cm):  0.04       Width (cm):  0.2       Percent Debrided (%):  100       Depth (cm):  0.1       Total Area Debrided (sq cm):  0.04    Depth of debridement:  Subcutaneous tissue    Tissue debrided:  Dermis, Epidermis and Subcutaneous    Devitalized tissue debrided:  Callus, Biofilm and Fibrin    Instruments:  Curette    Bleeding:  Minimal  Hemostasis Achieved: Yes    Method Used:  Pressure        "

## 2018-06-19 NOTE — PROGRESS NOTES
Subjective:      Patient ID: Sisi Medel is a 57 y.o. female.    Chief Complaint: Idiopathic peripheral neuropathy (rt foot)    Sisi is a 57 y.o. female who presents to the clinic for evaluation and treatment of high risk feet. Sisi has a past medical history of Anemia in ESRD (end-stage renal disease) (3/7/2016); Anticoagulant long-term use; Cervical radiculopathy (7/20/2015); CHF (congestive heart failure); Chronic combined systolic and diastolic heart failure (6/14/2013); Chronic respiratory failure with hypoxia (04/22/2013); Closed fracture of proximal end of right fibula (6/27/2016); Complications due to renal dialysis device, implant, and graft; DDD (degenerative disc disease), lumbar (6/17/2015); Dependence on renal dialysis; Diabetes mellitus type II, controlled (12/8/2017); ESRD on hemodialysis (2/23/2016); Essential hypertension; Gout; Lateral meniscal tear (5/31/2016); Lumbar stenosis (6/17/2015); Pacemaker; Paroxysmal atrial fibrillation (5/16/2014); Peripheral neuropathy (11/13/2013); Personal history of gastric ulcer (3/19/2013); Sarcoidosis, lung (2009); Secondary pulmonary hypertension (4/7/2015); Seizure disorder (3/19/2013); Seizures; Shingles (11/13/2013); and Thyroid disease. weekly follow up for R great toe ulceration. No new issues. No pain. Still doing well with dressings and darco shoe. Has been having some problems with her O2. Saw pulmonologist yesterday (6/18/18). No other pedal complaints.    This patient has documented high risk feet requiring routine maintenance secondary to peripheral neuropathy.        PCP: Carlos A Caputo MD    Date Last Seen by PCP:   Chief Complaint   Patient presents with    Idiopathic peripheral neuropathy     rt foot         Current shoe gear:  Affected Foot: Football and Darco shoe on the affected foot     Unaffected Foot: Slip-on shoes    Last encounter in this department: Visit date not found    Hemoglobin A1C   Date Value Ref Range Status    06/13/2018 7.4 (H) 4.0 - 5.6 % Final     Comment:     ADA Screening Guidelines:  5.7-6.4%  Consistent with prediabetes  >or=6.5%  Consistent with diabetes  High levels of fetal hemoglobin interfere with the HbA1C  assay. Heterozygous hemoglobin variants (HbS, HgC, etc)do  not significantly interfere with this assay.   However, presence of multiple variants may affect accuracy.     01/02/2018 6.6 (H) 4.0 - 5.6 % Final     Comment:     According to ADA guidelines, hemoglobin A1c <7.0% represents  optimal control in non-pregnant diabetic patients. Different  metrics may apply to specific patient populations.   Standards of Medical Care in Diabetes-2016.  For the purpose of screening for the presence of diabetes:  <5.7%     Consistent with the absence of diabetes  5.7-6.4%  Consistent with increasing risk for diabetes   (prediabetes)  >or=6.5%  Consistent with diabetes  Currently, no consensus exists for use of hemoglobin A1c  for diagnosis of diabetes for children.  This Hemoglobin A1c assay has significant interference with fetal   hemoglobin   (HbF). The results are invalid for patients with abnormal amounts of   HbF,   including those with known Hereditary Persistence   of Fetal Hemoglobin. Heterozygous hemoglobin variants (HbAS, HbAC,   HbAD, HbAE, HbA2) do not significantly interfere with this assay;   however, presence of multiple variants in a sample may impact the %   interference.     12/07/2017 6.5 (H) 4.0 - 5.6 % Final     Comment:     According to ADA guidelines, hemoglobin A1c <7.0% represents  optimal control in non-pregnant diabetic patients. Different  metrics may apply to specific patient populations.   Standards of Medical Care in Diabetes-2016.  For the purpose of screening for the presence of diabetes:  <5.7%     Consistent with the absence of diabetes  5.7-6.4%  Consistent with increasing risk for diabetes   (prediabetes)  >or=6.5%  Consistent with diabetes  Currently, no consensus exists for use  of hemoglobin A1c  for diagnosis of diabetes for children.  This Hemoglobin A1c assay has significant interference with fetal   hemoglobin   (HbF). The results are invalid for patients with abnormal amounts of   HbF,   including those with known Hereditary Persistence   of Fetal Hemoglobin. Heterozygous hemoglobin variants (HbAS, HbAC,   HbAD, HbAE, HbA2) do not significantly interfere with this assay;   however, presence of multiple variants in a sample may impact the %   interference.       Past Medical History:   Diagnosis Date    Anemia in ESRD (end-stage renal disease) 3/7/2016    Anticoagulant long-term use     Cervical radiculopathy 2015    CHF (congestive heart failure)     Chronic combined systolic and diastolic heart failure 2013    EF 20% , improved with Medical therapy, negative PET     Chronic respiratory failure with hypoxia 2013    On home oxygen at 2-5 liters    Closed fracture of proximal end of right fibula 2016    Complications due to renal dialysis device, implant, and graft     DDD (degenerative disc disease), lumbar 2015    Dependence on renal dialysis     Diabetes mellitus type II, controlled 2017    ESRD on hemodialysis 2016    Essential hypertension     Gout     Lateral meniscal tear 2016    Lumbar stenosis 2015    Pacemaker     Paroxysmal atrial fibrillation 2014    Not on anticoagulation    Peripheral neuropathy 2013    Personal history of gastric ulcer 3/19/2013    Sarcoidosis, lung 2009    Diagnosed in  with ocular manifestation, on 4L home O2 and PO steroids    Secondary pulmonary hypertension 2015    Seizure disorder 3/19/2013    Seizures     Shingles 2013    Thyroid disease        Past Surgical History:   Procedure Laterality Date    ABDOMINAL SURGERY      CARDIAC PACEMAKER PLACEMENT       SECTION      x2    OTHER SURGICAL HISTORY      loop recorder implant    stent to  fistula      VASCULAR SURGERY      fistula to L upper arm        Family History   Problem Relation Age of Onset    Kidney failure Mother     Hypertension Mother     Diabetes Mother     Coronary artery disease Father     Hypertension Father     Diabetes Father     Lupus Sister     Diabetes Maternal Grandmother     Colon cancer Neg Hx     Ovarian cancer Neg Hx     Breast cancer Neg Hx        Social History     Social History    Marital status: Single     Spouse name: N/A    Number of children: N/A    Years of education: N/A     Social History Main Topics    Smoking status: Former Smoker     Packs/day: 0.50     Years: 10.00     Types: Cigarettes     Quit date: 4/22/1994    Smokeless tobacco: Never Used    Alcohol use No      Comment: rarely    Drug use: No    Sexual activity: Not Currently     Other Topics Concern    Are You Pregnant Or Think You May Be? No    Breast-Feeding No     Social History Narrative    Lives with boyfriend/partner who helps with her care.            Current Outpatient Prescriptions   Medication Sig Dispense Refill    ACZONE 5 % topical gel Apply to face every morning 60 g 2    atorvastatin (LIPITOR) 80 MG tablet TAKE 1 TABLET(80 MG) BY MOUTH EVERY NIGHT 90 tablet 3    betamethasone dipropionate (DIPROLENE) 0.05 % ointment AAA bid hand under occlusion 45 g 3    blood sugar diagnostic Strp 1 strip by Misc.(Non-Drug; Combo Route) route 2 (two) times daily with meals. 100 strip 3    blood-glucose meter kit Use as instructed 1 each 0    cholecalciferol, vitamin D3, 1,000 unit capsule Take 1 capsule (1,000 Units total) by mouth once daily. (Patient taking differently: Take 1,000 Units by mouth every evening. )  0    clindamycin (CLEOCIN T) 1 % lotion AAA face bid 60 mL 3    clobetasol 0.05% (TEMOVATE) 0.05 % Oint AAA hand bid - under occlusion qhs 60 g 3    clopidogrel (PLAVIX) 75 mg tablet Take 1 tablet (75 mg total) by mouth once daily. 30 tablet 11    diclofenac  sodium (SOLARAZE) 3 % gel       fludrocortisone (FLORINEF) 0.1 mg Tab Take 100 mcg by mouth 2 (two) times daily.      gabapentin (NEURONTIN) 600 MG tablet Take 1 tablet (600 mg total) by mouth 2 (two) times daily. 60 tablet 5    ketorolac 0.5% (ACULAR) 0.5 % Drop instill one drop into both eyes every morning  3    lancets Misc 1 lancet by Misc.(Non-Drug; Combo Route) route 2 (two) times daily with meals. 100 each 3    lancing device Misc 1 Device by Misc.(Non-Drug; Combo Route) route 2 (two) times daily with meals. 1 each 0    levETIRAcetam (KEPPRA) 500 MG Tab Take 1 tablet (500 mg total) by mouth 2 (two) times daily. 180 tablet 3    omeprazole (PRILOSEC) 20 MG capsule TAKE 1 CAPSULE BY MOUTH ONCE DAILY 30 capsule 4    polyethylene glycol (GLYCOLAX) 17 gram/dose powder Take 17 g by mouth once daily. Dissolve in juice. 510 g 0    prednisoLONE acetate (PRED FORTE) 1 % DrpS INSTILL ONE DROP INTO  LEFT EYE THREE TIMES A DAY  3    predniSONE (DELTASONE) 10 MG tablet TK 1 T PO ONCE D  2    predniSONE (DELTASONE) 10 MG tablet Take 2 tablets (20 mg total) by mouth once daily. Take with food.  After 10 days resume 10 mg daily dose. 20 tablet 0    PREVNAR 13, PF, 0.5 mL Syrg ADM 0.5ML IM UTD  0    DENISE-LINDA 0.8 mg Tab TK 1 T PO QD  0    RENVELA 800 mg Tab TAKE 1 TABLET BY MOUTH THREE TIMES DAILY WITH MEALS 90 tablet 0    SENSIPAR 30 mg Tab daily with breakfast.       tiZANidine (ZANAFLEX) 4 MG tablet 1 tablet by mouth every 8-12 hours as needed for muscle spasm 90 tablet 0    warfarin (COUMADIN) 5 MG tablet Take 1 tablet (5 mg total) by mouth Daily. Per [facility] (Patient taking differently: Take 5 mg by mouth every evening. Per [facility]) 90 tablet 3    warfarin (COUMADIN) 5 MG tablet TAKE 1 TABLET BY MOUTH EVERY DAY EXCEPT TAKE 1 1/2 ON MONDAY AND FRIDAY OR AS DIRECTED BY CLINIC 102 tablet 0    colchicine 0.6 mg tablet Take 1 tablet (0.6 mg total) by mouth once. 1 tablet 0    midodrine  (PROAMATINE) 5 MG Tab Take 2 tablets (10 mg total) by mouth every Mon, Wed, Fri, Sun. 32 tablet 11     No current facility-administered medications for this visit.        Review of patient's allergies indicates:   Allergen Reactions    Bactrim [sulfamethoxazole-trimethoprim] Other (See Comments)     Causes renal failure    Nsaids (non-steroidal anti-inflammatory drug) Other (See Comments)     Renal failure       Review of Systems   Constitution: Negative for chills and fever.   Cardiovascular: Positive for leg swelling. Negative for chest pain and claudication.   Respiratory: Negative for cough and shortness of breath.    Skin: Positive for dry skin, poor wound healing and suspicious lesions.   Musculoskeletal: Positive for myalgias.   Gastrointestinal: Negative for nausea and vomiting.   Neurological: Positive for numbness and paresthesias.   Psychiatric/Behavioral: Negative for altered mental status.           Objective:      Physical Exam   Constitutional: She is oriented to person, place, and time. She appears well-developed and well-nourished.   HENT:   Head: Normocephalic.   Cardiovascular: Intact distal pulses.    Pedal pulses diminished b/l. Toes cool to touch b/l. 2+ edema noted to b/l LE. No vericosities noted to b/l LEs.      Pulmonary/Chest: No respiratory distress.   Musculoskeletal:   Gastrocnemius equinus noted to b/l ankles with decreased DF noted on exam. MMT 5/5 in DF/PF/Inv/Ev resistance with no reproduction of pain in any direction. Passive range of motion of ankle and pedal joints is painless. Adequate pedal joint ROM.   No pain on Palpation of R great toe wound.    Feet:   Right Foot:   Protective Sensation: 10 sites tested. 0 sites sensed.   Skin Integrity: Positive for ulcer, skin breakdown and dry skin.   Left Foot:   Protective Sensation: 10 sites tested. 0 sites sensed.   Skin Integrity: Positive for dry skin. Negative for ulcer or skin breakdown.   Neurological: She is alert and  oriented to person, place, and time. She has normal strength. A sensory deficit is present.   Light touch, proprioception, and sharp/dull sensation are all intact bilaterally. Protective threshold with the [place] monofilament is completely diminished bilaterally.      Skin: Skin is warm, dry and intact. Lesion noted. No erythema.   Generalized hyperpigmentation to ankle and lower 1/3 of R leg consistent with hemosiderin deposits. Mild dermatitis R lower 1/3 leg. Overall skin is thin, shiny, atrophic to b/l LEs. Loss of pedal hair b/l. Toes are cool to touch b/l.     New ulceration noted to R great toe medial distal aspect. Previous large wound is now two small wounds.   Measurement: 0.2 x 0.2 x 0.1cm  (In aggregate of both wounds)  Base: 100% granular down to dermis only.   Periphery: slightly hyperkeratotic but viable  Undermining/tunelling: none  Drainage: sanguinous only  Erythema: none  Purulence: none  SOI: none    No other open wounds or lesions noted b/l foot.     Diffuse xerosis noted to plantar aspects of b/l foot/heels.      Psychiatric: She has a normal mood and affect. Her behavior is normal. Judgment and thought content normal.   Vitals reviewed.        Assessment:       Encounter Diagnoses   Name Primary?    Type II diabetes mellitus with neurological manifestations Yes    Bilateral lower extremity edema     Toe ulcer, right, with fat layer exposed          Plan:       Sisi was seen today for idiopathic peripheral neuropathy.    Diagnoses and all orders for this visit:    Type II diabetes mellitus with neurological manifestations  -     Debridement    Bilateral lower extremity edema  -     Debridement    Toe ulcer, right, with fat layer exposed  -     Debridement      I counseled the patient on her conditions, their implications and medical management.    - Shoe inspection. General Foot Education. Patient reminded of the importance of good nutrition. Patient instructed on proper foot  hygeine. We discussed wearing proper shoe gear, daily foot inspections, never walking without protective shoe gear, caution putting sharp instruments to feet     - Discussed general foot care:  Wear comfortable, proper fitting shoes. Wash feet daily. Dry well. After drying, apply moisturizer to feet (no lotion to webspaces). Inspect feet daily for skin breaks, blisters, swelling, or redness. Wear cotton socks (preferably white)  Change socks every day. Do NOT walk barefoot. Do NOT use heating pads or warm/hot water soaks     R hallux wound inspected and treated. Improvement noted this week. Excisional debridement preformed today. See separate procedure note. One large wound is now two small wounds. Healing slowly but well.     No SOI at this time. [place] to wound base, wound foam football dressing today. Continue Darco shoe for ambulation.     I warned patient to watch for wound recurrence as well as signs and symptoms of infection including redness, drainage, purulence, odor, streaking, fever, chills, etc and I advised her to seek medical attention (ER or urgent car) if these symptoms arise.     RTC 1 week for wound check, sooner PRN

## 2018-06-19 NOTE — PROGRESS NOTES
Subjective:       Patient ID: Sisi Medel is a 57 y.o. female.    Chief Complaint: Shortness of Breath    HPI HISTORY OF PRESENT ILLNESS:  Mrs. Medel is a 57-year-old former smoker who   comes for evaluation of chronic shortness of breath.  She reports being out of   breath with very modest activities around her home.  She feels that her   respiratory status has declined in recent weeks.  She has not had any associated   fever, hemoptysis, or pleuritic pain.  She remains on oxygen at 4-5 liters per   minute.  Despite the use of oxygen, she still feels out of breath with simple   daily activities.  She has not made any changes in her prescribed medications   during the past several months.    Mrs. Medel has end-stage renal disease and is currently maintained on   dialysis.  She is followed in the [facility] for treatment of chronic   congestive heart failure.  She has chronic lower extremity edema, but has not   noted any recent changes in the severity of her edema.      CB/HN  dd: 06/18/2018 20:12:41 (CDT)  td: 06/19/2018 13:24:53 (CDT)  Doc ID   #[id number]  Job ID #[id number]    CC:       Review of Systems   Constitutional: Positive for fatigue. Negative for fever.   HENT: Negative for postnasal drip, sinus pressure, voice change and congestion.    Respiratory: Positive for shortness of breath and dyspnea on extertion. Negative for cough, sputum production and wheezing.    Cardiovascular: Positive for leg swelling. Negative for chest pain.   Genitourinary: Negative for difficulty urinating.   Musculoskeletal: Negative for arthralgias and back pain.   Skin: Negative for rash.   Gastrointestinal: Negative for abdominal pain and acid reflux.   Neurological: Negative for dizziness and weakness.   Hematological: Negative for adenopathy.       Objective:      Physical Exam   Constitutional: She is oriented to person, place, and time. She appears well-developed.   Chronically ill appearing, here in  wheelchair. She is obese.   HENT:   Head: Normocephalic.   Nose: Nose normal.   Mouth/Throat: No oropharyngeal exudate.   Neck: Normal range of motion. No JVD present. No tracheal deviation present. No thyromegaly present.   Cardiovascular: Normal rate, regular rhythm and normal heart sounds.    No murmur heard.  Pulmonary/Chest: Symmetric chest wall expansion. No stridor. She has no wheezes. She has no rhonchi. She has rales (few rales posteriorly). She exhibits no tenderness.   Abdominal: Soft. There is no tenderness.   Musculoskeletal: She exhibits edema (mod LE edema).   Lymphadenopathy:     She has no cervical adenopathy.   Neurological: She is alert and oriented to person, place, and time.   Skin: Skin is warm and dry. No rash noted. No erythema. Nails show no clubbing.   Psychiatric: She has a normal mood and affect.   Vitals reviewed.    Personal Diagnostic Review    No flowsheet data found.      Assessment:       1. Pulmonary sarcoidosis    2. CRLD (chronic restrictive lung disease)    3. Chronic combined systolic and diastolic heart failure    4. Morbid obesity with BMI of 40.0-44.9, adult    5. ESRD (end stage renal disease) on dialysis        Outpatient Encounter Prescriptions as of 6/18/2018   Medication Sig Dispense Refill    ACZONE 5 % topical gel Apply to face every morning 60 g 2    atorvastatin (LIPITOR) 80 MG tablet TAKE 1 TABLET(80 MG) BY MOUTH EVERY NIGHT 90 tablet 3    betamethasone dipropionate (DIPROLENE) 0.05 % ointment AAA bid hand under occlusion 45 g 3    blood sugar diagnostic Strp 1 strip by Misc.(Non-Drug; Combo Route) route 2 (two) times daily with meals. 100 strip 3    blood-glucose meter kit Use as instructed 1 each 0    cholecalciferol, vitamin D3, 1,000 unit capsule Take 1 capsule (1,000 Units total) by mouth once daily. (Patient taking differently: Take 1,000 Units by mouth every evening. )  0    clindamycin (CLEOCIN T) 1 % lotion AAA face bid 60 mL 3    clobetasol 0.05%  (TEMOVATE) 0.05 % Oint AAA hand bid - under occlusion qhs 60 g 3    clopidogrel (PLAVIX) 75 mg tablet Take 1 tablet (75 mg total) by mouth once daily. 30 tablet 11    colchicine 0.6 mg tablet Take 1 tablet (0.6 mg total) by mouth once. 1 tablet 0    diclofenac sodium (SOLARAZE) 3 % gel       fludrocortisone (FLORINEF) 0.1 mg Tab Take 100 mcg by mouth 2 (two) times daily.      gabapentin (NEURONTIN) 600 MG tablet Take 1 tablet (600 mg total) by mouth 2 (two) times daily. 60 tablet 5    ketorolac 0.5% (ACULAR) 0.5 % Drop instill one drop into both eyes every morning  3    lancets Misc 1 lancet by Misc.(Non-Drug; Combo Route) route 2 (two) times daily with meals. 100 each 3    lancing device Misc 1 Device by Misc.(Non-Drug; Combo Route) route 2 (two) times daily with meals. 1 each 0    levETIRAcetam (KEPPRA) 500 MG Tab Take 1 tablet (500 mg total) by mouth 2 (two) times daily. 180 tablet 3    midodrine (PROAMATINE) 5 MG Tab Take 2 tablets (10 mg total) by mouth every Mon, Wed, Fri, Sun. 32 tablet 11    omeprazole (PRILOSEC) 20 MG capsule TAKE 1 CAPSULE BY MOUTH ONCE DAILY 30 capsule 4    polyethylene glycol (GLYCOLAX) 17 gram/dose powder Take 17 g by mouth once daily. Dissolve in juice. 510 g 0    prednisoLONE acetate (PRED FORTE) 1 % DrpS INSTILL ONE DROP INTO  LEFT EYE THREE TIMES A DAY  3    predniSONE (DELTASONE) 10 MG tablet TK 1 T PO ONCE D  2    predniSONE (DELTASONE) 10 MG tablet Take 2 tablets (20 mg total) by mouth once daily. Take with food.  After 10 days resume 10 mg daily dose. 20 tablet 0    PREVNAR 13, PF, 0.5 mL Syrg ADM 0.5ML IM UTD  0    DENISE-LINDA 0.8 mg Tab TK 1 T PO QD  0    RENVELA 800 mg Tab TAKE 1 TABLET BY MOUTH THREE TIMES DAILY WITH MEALS 90 tablet 0    SENSIPAR 30 mg Tab daily with breakfast.       tiZANidine (ZANAFLEX) 4 MG tablet 1 tablet by mouth every 8-12 hours as needed for muscle spasm 90 tablet 0    warfarin (COUMADIN) 5 MG tablet Take 1 tablet (5 mg total) by  mouth Daily. Per [facility] (Patient taking differently: Take 5 mg by mouth every evening. Per [facility]) 90 tablet 3    warfarin (COUMADIN) 5 MG tablet TAKE 1 TABLET BY MOUTH EVERY DAY EXCEPT TAKE 1 1/2 ON MONDAY AND FRIDAY OR AS DIRECTED BY CLINIC 102 tablet 0     No facility-administered encounter medications on file as of 6/18/2018.      No orders of the defined types were placed in this encounter.    Plan:     Begin trial with Prednisone 20 mg daily x 10 days; then resume 10 mg daily maintenance dose.  Arrange f/u eval with [facility].  If above ar unhelpful in improving symptoms, consider sleep study to define role for CPAP/BiPAP.

## 2018-06-19 NOTE — PATIENT INSTRUCTIONS
Begin trial with Prednisone 20 mg daily x 10 days; then resume 10 mg daily maintenance dose.  Arrange f/u eval with [facility].  If above ar unhelpful in improving symptoms, consider sleep study to define role for CPAP/BiPAP.

## 2018-06-22 ENCOUNTER — HOSPITAL ENCOUNTER (OUTPATIENT)
Dept: VASCULAR SURGERY | Facility: CLINIC | Age: 58
Discharge: HOME | End: 2018-06-22
Attending: SURGERY
Payer: MEDICARE

## 2018-06-22 ENCOUNTER — OFFICE VISIT (OUTPATIENT)
Dept: VASCULAR SURGERY | Facility: CLINIC | Age: 58
End: 2018-06-22
Attending: SURGERY
Payer: MEDICARE

## 2018-06-22 VITALS
BODY MASS INDEX: 39.76 KG/M2 | TEMPERATURE: 99 F | SYSTOLIC BLOOD PRESSURE: 112 MMHG | DIASTOLIC BLOOD PRESSURE: 67 MMHG | HEIGHT: 68 IN | HEART RATE: 70 BPM | WEIGHT: 262.38 LBS

## 2018-06-22 DIAGNOSIS — Z99.2 END STAGE RENAL FAILURE ON DIALYSIS: Primary | ICD-10-CM

## 2018-06-22 DIAGNOSIS — N18.6 END STAGE RENAL FAILURE ON DIALYSIS: ICD-10-CM

## 2018-06-22 DIAGNOSIS — N18.6 END STAGE RENAL FAILURE ON DIALYSIS: Primary | ICD-10-CM

## 2018-06-22 DIAGNOSIS — Z99.2 END STAGE RENAL FAILURE ON DIALYSIS: ICD-10-CM

## 2018-06-22 DIAGNOSIS — T82.858D ARTERIOVENOUS GRAFT STENOSIS, SUBSEQUENT ENCOUNTER: Primary | ICD-10-CM

## 2018-06-22 PROCEDURE — 3008F BODY MASS INDEX DOCD: CPT | Mod: CPTII,S$GLB,, | Performed by: SURGERY

## 2018-06-22 PROCEDURE — 99214 OFFICE O/P EST MOD 30 MIN: CPT | Mod: S$GLB,,, | Performed by: SURGERY

## 2018-06-22 PROCEDURE — 99999 PR PBB SHADOW E&M-EST. PATIENT-LVL V: CPT | Mod: PBBFAC,,, | Performed by: SURGERY

## 2018-06-22 PROCEDURE — 93990 DOPPLER FLOW TESTING: CPT | Mod: S$GLB,,, | Performed by: SURGERY

## 2018-06-22 RX ORDER — NORTRIPTYLINE HYDROCHLORIDE 25 MG/1
CAPSULE ORAL
Refills: 7 | COMMUNITY
Start: 2018-06-03 | End: 2018-08-15 | Stop reason: SDUPTHER

## 2018-06-22 RX ORDER — MIDODRINE HYDROCHLORIDE 5 MG/1
5 TABLET ORAL
Status: ON HOLD | COMMUNITY
Start: 2018-05-29 | End: 2019-01-01 | Stop reason: HOSPADM

## 2018-06-24 NOTE — PROGRESS NOTES
Sisi Medel  2018    HPI:  Patient is a 55 y.o.  female with a h/o HTN, HLD, CHF (EF 20%), COPD on home O2, paroxysmal Afib (on Coumadin), seizure disorder, ESRD on HD TThS via LUE AVG (placed 2/3/16) who presents to clinic today for follow up. She was last seen in our clinic on 2018, and recently underwent the followin.  Left upper extremity fistulogram.  2.  PTA of mid graft stenosis x2, left upper extremity graft with 8 x 40   Conquest balloon.  3.  PTA of left upper extremity AV graft outflow stenosis with a 10 x 40 Braxton   balloon.  4.  PTA of left upper extremity AV graft in-stent restenosis of venous outflow   with 8 x 40 Conquest balloon.    She has done well since her last visit, however, still continuing to have intermittent elevated venous pressure on the HD circuit. She is able to reliably complete each HD session.  She is feeling well with no complaints today. Ultrasound shows no significant areas of concern.    S/p   2/3/16: LUE AVG creation (Dr Villafana)  16: Percutaneous mechanical thrombectomy w Possis Angiojet AVX; 4fr OTW embolectomy of arterial inflow   PTA outflow stenosis with a 7x40 mm balloon  10/12/16: fistulogram (75% stenosis noted), left upper extremity AV graft PTA venous outflow with resolution of stenosis noted  2017 Declot and venous outflow PTA and stent with 8 x 50 VIABAHN  5/15/2017: PTA outflow stenosis (8x60 mustang); Stent outflow stenosis (8x50 Viabahn)  10/12/17: PTA LUE AV graft (brachial vein) x2: (7x60 Borger); (8x40 Braxton)   01/10/18: PTA outflow stensois (8x40 Braxton) and (9x40 Braxton)   18: PTA arterial inflow (7 x 40 Braxton)    No MI/stroke  Tobacco use: Former smoker     Past Medical History   Diagnosis Date    Anemia in ESRD (end-stage renal disease) 3/7/2016    Cervical radiculopathy 2015    Chronic combined systolic and diastolic heart failure 2013     EF 20% , improved with Medical therapy,  negative PET     Chronic respiratory failure with hypoxia 2013     On home oxygen at 2-5 liters    Chronic rhinitis 10/2/2013    DDD (degenerative disc disease), lumbar 2015    ESRD on hemodialysis 2016    Essential hypertension     Gout     Hyperlipidemia 3/7/2016    Lateral meniscal tear 2016    Lumbar stenosis 2015    Morbid obesity 8/15/2013    Paroxysmal atrial fibrillation 2014     Not on anticoagulation    Peripheral neuropathy 2013    Personal history of fall 2014    Personal history of gastric ulcer 3/19/2013    Sarcoidosis, lung 2009     Diagnosed in  with ocular manifestation, on 4L home O2 and PO steroids    Secondary pulmonary hypertension 2015    Seizure disorder 3/19/2013    Shingles 2013    Spondylarthrosis 2015     Past Surgical History   Procedure Laterality Date     section, classic      Abdominal surgery      Vascular surgery       Family History   Problem Relation Age of Onset    Kidney failure Mother     Hypertension Mother     Diabetes Mother     Coronary artery disease Father     Hypertension Father     Diabetes Father     Lupus Sister     Diabetes Maternal Grandmother     Asthma Neg Hx     Emphysema Neg Hx     Melanoma Neg Hx     Psoriasis Neg Hx      Social History     Social History    Marital status: Single     Spouse name: N/A    Number of children: N/A    Years of education: N/A     Occupational History    Not on file.     Social History Main Topics    Smoking status: Former Smoker     Packs/day: 0.50     Years: 10.00     Types: Cigarettes     Quit date: 1994    Smokeless tobacco: Never Used    Alcohol use No    Drug use: No    Sexual activity: No     Other Topics Concern    Not on file     Social History Narrative    Lives with boyfriend/partner who helps with her care.     Plans to travel to [place] for 2 weeks in 2016     Current Outpatient Prescriptions on File  Prior to Visit   Medication Sig    ACZONE 5 % topical gel Apply topically once daily.     allopurinol (ZYLOPRIM) 100 MG tablet Take 1 tablet (100 mg total) by mouth once daily.    amiodarone (PACERONE) 200 MG Tab Take 1 tablet (200 mg total) by mouth every morning.    ammonium lactate (LAC-HYDRIN) 12 % lotion Apply topically as needed (for scars on arms).     aspirin 81 MG Chew Take 81 mg by mouth every morning.    atorvastatin (LIPITOR) 80 MG tablet Take 80 mg by mouth once daily.     capsicum 0.075% (ZOSTRIX) 0.075 % topical cream Apply topically 3 (three) times daily. For neuropathic pain of feet    gabapentin (NEURONTIN) 100 MG capsule TAKE ONE CAPSULE BY MOUTH THREE TIMES DAILY (Patient taking differently: TAKE ONE CAPSULE BY MOUTH THREE TIMES DAILY-noon, evening, bedtime)    levetiracetam (KEPPRA) 500 MG Tab TAKE 1 TABLET BY MOUTH TWICE DAILY.    melatonin 5 mg Tab Take 1 tablet by mouth nightly.    midodrine (PROAMATINE) 10 MG tablet     midodrine (PROAMATINE) 5 MG Tab     NEPHRO-LINDA 0.8 mg Tab TK 1 T PO  QD    ondansetron (ZOFRAN-ODT) 8 MG TbDL Take 1 tablet (8 mg total) by mouth every 8 (eight) hours as needed.    oxycodone-acetaminophen (PERCOCET) 7.5-325 mg per tablet Take 1 tablet by mouth every 4 (four) hours as needed.    predniSONE (DELTASONE) 10 MG tablet TAKE 3 TABLETS BY MOUTH FOR 7 DAYS, THEN 2 TABLETS FOR 7 DAYS, THEN 1 TABLET EVERY DAY AFTER.    RENVELA 800 mg Tab TAKE 1 TABLET BY MOUTH THREE TIMES DAILY WITH MEALS    tizanidine 4 mg Cap Take 4 mg by mouth 2 (two) times daily as needed. (Patient taking differently: Take 4 mg by mouth daily as needed (for muscle spasms.). )    warfarin (COUMADIN) 5 MG tablet TAKE 1 TABLET(5 MG) BY MOUTH DAILY     No current facility-administered medications on file prior to visit.        REVIEW OF SYSTEMS:  General: negative; ENT: negative; Allergy and Immunology: negative; Hematological and Lymphatic: Negative; Endocrine: negative;  Respiratory: no cough, shortness of breath, or wheezing; Cardiovascular: no chest pain or dyspnea on exertion; Gastrointestinal: no abdominal pain/back, change in bowel habits, or bloody stools; Genito-Urinary: no dysuria, trouble voiding, or hematuria; Musculoskeletal: no pain, numbness, to LUE  Neurological: no TIA or stroke symptoms; Psychiatric: no nervousness, anxiety or depression.    PHYSICAL EXAM:   Vitals:    06/22/18 1317   BP: 112/67   Pulse: 70   Temp: 98.6 °F (37 °C)         General appearance:  Alert, well-appearing, and in no distress.  Oriented to person, place, and time   Neurological:  Normal speech, no focal findings noted; CN II - XII grossly intact           Musculoskeletal: Digits/nail without cyanosis/clubbing.  Normal muscle strength/tone.                 Neck: Supple, no significant adenopathy; thyroid is not enlarged                Chest:  Clear to auscultation, symmetric air entry      No use of accessory muscles             Cardiac: Normal rate and regular rhythm, S1 and S2 normal; PMI non-displaced          Abdomen: Soft, non-tender, non-distended, no masses or organomegaly      Extremities:   LUE AVG with palpable thrill, no increased pulsatility, 2+ distal radial pulse      LAB RESULTS:  Lab Results   Component Value Date    WBC 12.56 03/26/2018    HGB 14.7 03/26/2018    HCT 48.0 03/26/2018    MCV 94 03/26/2018     03/26/2018     BMP  Lab Results   Component Value Date     (L) 03/26/2018    K 5.2 (H) 03/26/2018    CL 90 (L) 03/26/2018    CO2 23 03/26/2018    BUN 76 (H) 03/26/2018    CREATININE 8.9 (H) 03/26/2018    CALCIUM 9.3 03/26/2018    ANIONGAP 19 (H) 03/26/2018    ESTGFRAFRICA 5.2 (A) 03/26/2018    EGFRNONAA 4.5 (A) 03/26/2018     Lab Results   Component Value Date    HGBA1C 7.4 (H) 06/13/2018       IMAGING:  Velocities: in cm./sec    Inflow-137  arterial anastomosis-452  proximal graft-330  mid graft-171  distal graft-170  venous anastomosis-157  venous  outflow(stent)-111  venous outflow(distal stent)- 182  volume flow: 1644 ml./min.     Overall Impression:  Color flow duplex exam reveals a patent left hemodialysis AV graft and outflow stents with no evidence of a hemodyanmically significant stenoisis.  Volume flow at mid graft level measures 1644 ml./min.       IMP/PLAN:  55 y.o. female with HTN, HLD, CHF (EF 20%), COPD on home O2, paroxysmal Afib (on Coumadin), seizure disorder, ESRD on HD TThS via LUE AVG. Doing well today with no concerning findings on ultrasound.    - Ultrasound reviewed - no evidence of hemodynamically significant stenosis  - Return to clinic in 2 months with graft duplex  - continue statin, coumadin      Torrey Villafana MD  Vascular & Endovascular Surgery

## 2018-06-26 DIAGNOSIS — I50.22 CHRONIC SYSTOLIC HEART FAILURE: Primary | ICD-10-CM

## 2018-06-27 ENCOUNTER — OFFICE VISIT (OUTPATIENT)
Dept: PODIATRY | Facility: CLINIC | Age: 58
End: 2018-06-27
Payer: MEDICARE

## 2018-06-27 ENCOUNTER — ANTI-COAG VISIT (OUTPATIENT)
Dept: CARDIOLOGY | Facility: CLINIC | Age: 58
End: 2018-06-27
Payer: MEDICARE

## 2018-06-27 ENCOUNTER — TELEPHONE (OUTPATIENT)
Dept: PODIATRY | Facility: CLINIC | Age: 58
End: 2018-06-27

## 2018-06-27 VITALS
BODY MASS INDEX: 37.51 KG/M2 | HEART RATE: 69 BPM | SYSTOLIC BLOOD PRESSURE: 113 MMHG | WEIGHT: 262 LBS | DIASTOLIC BLOOD PRESSURE: 83 MMHG | HEIGHT: 70 IN

## 2018-06-27 DIAGNOSIS — L97.511 TOE ULCER, RIGHT, LIMITED TO BREAKDOWN OF SKIN: ICD-10-CM

## 2018-06-27 DIAGNOSIS — Z79.01 CURRENT USE OF LONG TERM ANTICOAGULATION: Primary | ICD-10-CM

## 2018-06-27 DIAGNOSIS — E11.49 TYPE II DIABETES MELLITUS WITH NEUROLOGICAL MANIFESTATIONS: Primary | ICD-10-CM

## 2018-06-27 LAB — INR PPP: 2.5 (ref 2–3)

## 2018-06-27 PROCEDURE — 99212 OFFICE O/P EST SF 10 MIN: CPT | Mod: S$GLB,,, | Performed by: PODIATRIST

## 2018-06-27 PROCEDURE — 3008F BODY MASS INDEX DOCD: CPT | Mod: CPTII,S$GLB,, | Performed by: PODIATRIST

## 2018-06-27 PROCEDURE — 85610 PROTHROMBIN TIME: CPT | Mod: QW,S$GLB,, | Performed by: INTERNAL MEDICINE

## 2018-06-27 PROCEDURE — 3045F PR MOST RECENT HEMOGLOBIN A1C LEVEL 7.0-9.0%: CPT | Mod: CPTII,S$GLB,, | Performed by: PODIATRIST

## 2018-06-27 PROCEDURE — 99999 PR PBB SHADOW E&M-EST. PATIENT-LVL III: CPT | Mod: PBBFAC,,, | Performed by: PODIATRIST

## 2018-06-27 NOTE — PROGRESS NOTES
Subjective:      Patient ID: Sisi Medel is a 57 y.o. female.    Chief Complaint: Diabetes Mellitus (dr eduardo/04/27/2018) and Diabetic Foot Exam    Wound right great toe.  Slowly improving with football dressing wound care and offloading with Dr. Hamlin. Denies trauma, signs infection.    Review of Systems   Constitution: Negative for chills, diaphoresis, fever, weakness, malaise/fatigue and night sweats.   Cardiovascular: Negative for claudication, cyanosis and leg swelling.   Skin: Positive for dry skin and poor wound healing.   Neurological: Positive for numbness and sensory change. Negative for paresthesias and tremors.           Objective:      Physical Exam   Cardiovascular:   Pulses:       Dorsalis pedis pulses are 2+ on the right side, and 2+ on the left side.        Posterior tibial pulses are 2+ on the right side, and 2+ on the left side.   All toes warm, pink.       Musculoskeletal:    All ten toes without clubbing, cyanosis, or signs of ischemia.  No pain to palpation bilateral lower extremities.  Range of motion, stability, muscle strength, and muscle tone normal bilateral feet and legs.     Lymphadenopathy:   Negative lymphadenopathy bilateral popliteal fossa and tarsal tunnel.  Negative lymphangitic streaking bilateral foot/ankle bilateral.     Neurological:   Diminished/loss of protective sensation all toes bilateral to 10 gram monofilament.     Skin:   Wound:  Medial right hallux    Size:    Pre:[id number]      Base:  tannish stable eschar without drainage. No pus, tracking, fluctuance, malodor, or cardinal signs infection.    Borders:  ,  flat, pink, blanchable skin edges without undermining.               Assessment:       Encounter Diagnoses   Name Primary?    Type II diabetes mellitus with neurological manifestations Yes    Toe ulcer, right, limited to breakdown of skin          Plan:       Sisi was seen today for diabetes mellitus and diabetic foot exam.    Diagnoses and all  orders for this visit:    Type II diabetes mellitus with neurological manifestations    Toe ulcer, right, limited to breakdown of skin      I counseled the patient on her conditions, their implications and medical management.    Swab wound right hallux with betadine and covered with football dressing to offload and protect.    Ambulate minimally in sx shoe / boot right, DM shoes left.    Inspect feet multiple times daily for signs of occurrence/recurrence ulceration.    Adequate vitamin supplementation, protein intake, and hydration - discussed with patient.            Follow-up in about 1 week (around 7/4/2018) for sai Hamlin.

## 2018-06-27 NOTE — PROGRESS NOTES
INR within normal range today. Patient with bruises on body from use, denies any bleeding or changes. Patient appears stable on this dose. She will continue this dose until follow-up in 4 weeks. I advised her to contact us with any changes or problems.

## 2018-06-28 ENCOUNTER — TELEPHONE (OUTPATIENT)
Dept: PODIATRY | Facility: CLINIC | Age: 58
End: 2018-06-28

## 2018-06-28 NOTE — TELEPHONE ENCOUNTER
----- Message from Zora Lopez LPN sent at 6/28/2018  1:53 PM CDT -----  Contact: Self      ----- Message -----  From: Radha Lemos  Sent: 6/28/2018   1:51 PM  To: [facility] Staff    Patient calling to schedule weekly check up.  showing no availability until August.    Patient can be reached at [phone]

## 2018-06-28 NOTE — TELEPHONE ENCOUNTER
----- Message from Zora Lopez LPN sent at 6/28/2018  1:53 PM CDT -----  Contact: Self      ----- Message -----  From: Radha Lemos  Sent: 6/28/2018   1:51 PM  To: [facility] Staff    Patient calling to schedule weekly check up.  showing no availability until August.    Patient can be reached at [phone]

## 2018-06-30 DIAGNOSIS — K21.00 GASTROESOPHAGEAL REFLUX DISEASE WITH ESOPHAGITIS: ICD-10-CM

## 2018-07-03 RX ORDER — OMEPRAZOLE 20 MG/1
CAPSULE, DELAYED RELEASE ORAL
Qty: 90 CAPSULE | Refills: 4 | Status: ON HOLD | OUTPATIENT
Start: 2018-07-03 | End: 2019-01-01 | Stop reason: HOSPADM

## 2018-07-06 ENCOUNTER — OFFICE VISIT (OUTPATIENT)
Dept: PODIATRY | Facility: CLINIC | Age: 58
End: 2018-07-06
Payer: MEDICARE

## 2018-07-06 VITALS
HEART RATE: 69 BPM | HEIGHT: 69 IN | SYSTOLIC BLOOD PRESSURE: 124 MMHG | BODY MASS INDEX: 38.8 KG/M2 | WEIGHT: 262 LBS | DIASTOLIC BLOOD PRESSURE: 89 MMHG

## 2018-07-06 DIAGNOSIS — L97.511 TOE ULCER, RIGHT, LIMITED TO BREAKDOWN OF SKIN: ICD-10-CM

## 2018-07-06 DIAGNOSIS — E11.49 TYPE II DIABETES MELLITUS WITH NEUROLOGICAL MANIFESTATIONS: Primary | ICD-10-CM

## 2018-07-06 PROCEDURE — 99213 OFFICE O/P EST LOW 20 MIN: CPT | Mod: S$GLB,,, | Performed by: PODIATRIST

## 2018-07-06 PROCEDURE — 3008F BODY MASS INDEX DOCD: CPT | Mod: CPTII,S$GLB,, | Performed by: PODIATRIST

## 2018-07-06 PROCEDURE — 99999 PR PBB SHADOW E&M-EST. PATIENT-LVL III: CPT | Mod: PBBFAC,,, | Performed by: PODIATRIST

## 2018-07-06 PROCEDURE — 3045F PR MOST RECENT HEMOGLOBIN A1C LEVEL 7.0-9.0%: CPT | Mod: CPTII,S$GLB,, | Performed by: PODIATRIST

## 2018-07-06 NOTE — PROGRESS NOTES
Subjective:      Patient ID: Sisi Medel is a 57 y.o. female.    Chief Complaint: Diabetes Mellitus (rt foot Dr Caputo 4/27/18); Diabetic Foot Exam; and Foot Ulcer    follow up Wound right great toe.  Slowly improving with football dressing wound care and offloading with Dr. Hamlin. Denies trauma, signs infection.  No new isuses      Past Medical History:   Diagnosis Date    Anemia in ESRD (end-stage renal disease) 3/7/2016    Anticoagulant long-term use     Cervical radiculopathy 7/20/2015    CHF (congestive heart failure)     Chronic combined systolic and diastolic heart failure 6/14/2013    EF 20% 2013, improved with Medical therapy, negative PET 2013    Chronic respiratory failure with hypoxia 04/22/2013    On home oxygen at 2-5 liters    Closed fracture of proximal end of right fibula 6/27/2016    Complications due to renal dialysis device, implant, and graft     DDD (degenerative disc disease), lumbar 6/17/2015    Dependence on renal dialysis     Diabetes mellitus type II, controlled 12/8/2017    ESRD on hemodialysis 2/23/2016    Essential hypertension     Gout     Lateral meniscal tear 5/31/2016    Lumbar stenosis 6/17/2015    Pacemaker     Paroxysmal atrial fibrillation 5/16/2014    Not on anticoagulation    Peripheral neuropathy 11/13/2013    Personal history of gastric ulcer 3/19/2013    Sarcoidosis, lung 2009    Diagnosed in 2009 with ocular manifestation, on 4L home O2 and PO steroids    Secondary pulmonary hypertension 4/7/2015    Seizure disorder 3/19/2013    Seizures     Shingles 11/13/2013    Thyroid disease            Current Outpatient Prescriptions on File Prior to Visit   Medication Sig Dispense Refill    ACZONE 5 % topical gel Apply to face every morning 60 g 2    atorvastatin (LIPITOR) 80 MG tablet TAKE 1 TABLET(80 MG) BY MOUTH EVERY NIGHT 90 tablet 3    betamethasone dipropionate (DIPROLENE) 0.05 % ointment AAA bid hand under occlusion 45 g 3    blood  sugar diagnostic Strp 1 strip by Misc.(Non-Drug; Combo Route) route 2 (two) times daily with meals. 100 strip 3    blood-glucose meter kit Use as instructed 1 each 0    cholecalciferol, vitamin D3, 1,000 unit capsule Take 1 capsule (1,000 Units total) by mouth once daily. (Patient taking differently: Take 1,000 Units by mouth every evening. )  0    clindamycin (CLEOCIN T) 1 % lotion AAA face bid 60 mL 3    clobetasol 0.05% (TEMOVATE) 0.05 % Oint AAA hand bid - under occlusion qhs 60 g 3    clopidogrel (PLAVIX) 75 mg tablet Take 1 tablet (75 mg total) by mouth once daily. 30 tablet 11    diclofenac sodium (SOLARAZE) 3 % gel       fludrocortisone (FLORINEF) 0.1 mg Tab Take 100 mcg by mouth 2 (two) times daily.      gabapentin (NEURONTIN) 600 MG tablet Take 1 tablet (600 mg total) by mouth 2 (two) times daily. 60 tablet 5    ketorolac 0.5% (ACULAR) 0.5 % Drop instill one drop into both eyes every morning  3    lancets Misc 1 lancet by Misc.(Non-Drug; Combo Route) route 2 (two) times daily with meals. 100 each 3    lancing device Misc 1 Device by Misc.(Non-Drug; Combo Route) route 2 (two) times daily with meals. 1 each 0    levETIRAcetam (KEPPRA) 500 MG Tab Take 1 tablet (500 mg total) by mouth 2 (two) times daily. 180 tablet 3    midodrine (PROAMATINE) 5 MG Tab       nortriptyline (PAMELOR) 25 MG capsule TK 1 C PO QPM  7    omeprazole (PRILOSEC) 20 MG capsule TAKE 1 CAPSULE BY MOUTH EVERY DAY 90 capsule 4    polyethylene glycol (GLYCOLAX) 17 gram/dose powder Take 17 g by mouth once daily. Dissolve in juice. 510 g 0    prednisoLONE acetate (PRED FORTE) 1 % DrpS INSTILL ONE DROP INTO  LEFT EYE THREE TIMES A DAY  3    predniSONE (DELTASONE) 10 MG tablet TK 1 T PO ONCE D  2    predniSONE (DELTASONE) 10 MG tablet Take 2 tablets (20 mg total) by mouth once daily. Take with food.  After 10 days resume 10 mg daily dose. 20 tablet 0    PREVNAR 13, PF, 0.5 mL Syrg ADM 0.5ML IM UTD  0    DENISE-LINDA 0.8 mg Tab  TK 1 T PO QD  0    RENVELA 800 mg Tab TAKE 1 TABLET BY MOUTH THREE TIMES DAILY WITH MEALS 90 tablet 0    SENSIPAR 30 mg Tab daily with breakfast.       tiZANidine (ZANAFLEX) 4 MG tablet 1 tablet by mouth every 8-12 hours as needed for muscle spasm 90 tablet 0    warfarin (COUMADIN) 5 MG tablet Take 1 tablet (5 mg total) by mouth Daily. Per [facility] (Patient taking differently: Take 5 mg by mouth every evening. Per [facility]) 90 tablet 3    warfarin (COUMADIN) 5 MG tablet TAKE 1 TABLET BY MOUTH EVERY DAY EXCEPT TAKE 1 1/2 ON MONDAY AND FRIDAY OR AS DIRECTED BY CLINIC 102 tablet 0    colchicine 0.6 mg tablet Take 1 tablet (0.6 mg total) by mouth once. 1 tablet 0     No current facility-administered medications on file prior to visit.            Review of patient's allergies indicates:   Allergen Reactions    Bactrim [sulfamethoxazole-trimethoprim] Other (See Comments)     Causes renal failure    Nsaids (non-steroidal anti-inflammatory drug) Other (See Comments)     Renal failure           Social History     Social History    Marital status: Single     Spouse name: N/A    Number of children: N/A    Years of education: N/A     Occupational History    Not on file.     Social History Main Topics    Smoking status: Former Smoker     Packs/day: 0.50     Years: 10.00     Types: Cigarettes     Quit date: 4/22/1994    Smokeless tobacco: Never Used    Alcohol use No      Comment: rarely    Drug use: No    Sexual activity: Not Currently     Other Topics Concern    Are You Pregnant Or Think You May Be? No    Breast-Feeding No     Social History Narrative    Lives with boyfriend/partner who helps with her care.                Review of Systems   Constitution: Negative for chills, diaphoresis, fever, weakness, malaise/fatigue and night sweats.   Cardiovascular: Negative for claudication, cyanosis and leg swelling.   Skin: Positive for dry skin and poor wound healing.   Neurological: Positive for  "numbness and sensory change. Negative for paresthesias and tremors.           Objective:        Vitals:    07/06/18 1341   BP: 124/89   Pulse: 69   Weight: 118.8 kg (262 lb)   Height: 5' 9" (1.753 m)           Physical Exam   Cardiovascular:   Pulses:       Dorsalis pedis pulses are 2+ on the right side, and 2+ on the left side.        Posterior tibial pulses are 2+ on the right side, and 2+ on the left side.   All toes warm, pink.       Musculoskeletal:    All ten toes without clubbing, cyanosis, or signs of ischemia.  No pain to palpation bilateral lower extremities.  Range of motion, stability, muscle strength, and muscle tone normal bilateral feet and legs.     Lymphadenopathy:   Negative lymphadenopathy bilateral popliteal fossa and tarsal tunnel.  Negative lymphangitic streaking bilateral foot/ankle bilateral.     Neurological:   Diminished/loss of protective sensation all toes bilateral to 10 gram monofilament.     Skin:   Wound:  Medial right hallux    Size:    1mm x 2mm with overlying callus.   Granular base.  No redness.  No increased temperature.   No pus.              Assessment:       Encounter Diagnoses   Name Primary?    Type II diabetes mellitus with neurological manifestations Yes    Toe ulcer, right, limited to breakdown of skin          Plan:       Sisi was seen today for diabetes mellitus, diabetic foot exam and foot ulcer.    Diagnoses and all orders for this visit:    Type II diabetes mellitus with neurological manifestations    Toe ulcer, right, limited to breakdown of skin      I counseled the patient on her conditions, their implications and medical management.    Swab wound right hallux with topical antibiotics and covered with football dressing to offload and protect.    Ambulate minimally in sx shoe / boot right, DM shoes left.      follow up as scheduled.         "

## 2018-07-12 ENCOUNTER — OFFICE VISIT (OUTPATIENT)
Dept: PODIATRY | Facility: CLINIC | Age: 58
End: 2018-07-12
Payer: MEDICARE

## 2018-07-12 VITALS
WEIGHT: 262 LBS | DIASTOLIC BLOOD PRESSURE: 68 MMHG | HEIGHT: 69 IN | BODY MASS INDEX: 38.8 KG/M2 | HEART RATE: 69 BPM | SYSTOLIC BLOOD PRESSURE: 98 MMHG

## 2018-07-12 DIAGNOSIS — E11.49 TYPE II DIABETES MELLITUS WITH NEUROLOGICAL MANIFESTATIONS: Primary | ICD-10-CM

## 2018-07-12 DIAGNOSIS — R60.0 BILATERAL LOWER EXTREMITY EDEMA: ICD-10-CM

## 2018-07-12 DIAGNOSIS — L97.511 TOE ULCER, RIGHT, LIMITED TO BREAKDOWN OF SKIN: ICD-10-CM

## 2018-07-12 PROCEDURE — 3045F PR MOST RECENT HEMOGLOBIN A1C LEVEL 7.0-9.0%: CPT | Mod: CPTII,S$GLB,, | Performed by: PODIATRIST

## 2018-07-12 PROCEDURE — 99999 PR PBB SHADOW E&M-EST. PATIENT-LVL III: CPT | Mod: PBBFAC,,, | Performed by: PODIATRIST

## 2018-07-12 PROCEDURE — 3008F BODY MASS INDEX DOCD: CPT | Mod: CPTII,S$GLB,, | Performed by: PODIATRIST

## 2018-07-12 PROCEDURE — 99213 OFFICE O/P EST LOW 20 MIN: CPT | Mod: S$GLB,,, | Performed by: PODIATRIST

## 2018-07-12 NOTE — PROGRESS NOTES
Subjective:      Patient ID: Sisi Medel is a 57 y.o. female.    Chief Complaint: Diabetes Mellitus (bilteral pcp Dr Caputo 4/27/18); Diabetic Foot Exam; and toe ulcer (rt little toe)    follow up Wound right great toe.  Slowly improving with football dressing wound care   Denies trauma, signs infection. States her previous football dressing got wet so she took it off and put a bandaid on the toe. States the bandaid eventually also got wet but she kept it on. No other new isuses      Past Medical History:   Diagnosis Date    Anemia in ESRD (end-stage renal disease) 3/7/2016    Anticoagulant long-term use     Cervical radiculopathy 7/20/2015    CHF (congestive heart failure)     Chronic combined systolic and diastolic heart failure 6/14/2013    EF 20% 2013, improved with Medical therapy, negative PET 2013    Chronic respiratory failure with hypoxia 04/22/2013    On home oxygen at 2-5 liters    Closed fracture of proximal end of right fibula 6/27/2016    Complications due to renal dialysis device, implant, and graft     DDD (degenerative disc disease), lumbar 6/17/2015    Dependence on renal dialysis     Diabetes mellitus type II, controlled 12/8/2017    ESRD on hemodialysis 2/23/2016    Essential hypertension     Gout     Lateral meniscal tear 5/31/2016    Lumbar stenosis 6/17/2015    Pacemaker     Paroxysmal atrial fibrillation 5/16/2014    Not on anticoagulation    Peripheral neuropathy 11/13/2013    Personal history of gastric ulcer 3/19/2013    Sarcoidosis, lung 2009    Diagnosed in 2009 with ocular manifestation, on 4L home O2 and PO steroids    Secondary pulmonary hypertension 4/7/2015    Seizure disorder 3/19/2013    Seizures     Shingles 11/13/2013    Thyroid disease            Current Outpatient Prescriptions on File Prior to Visit   Medication Sig Dispense Refill    ACZONE 5 % topical gel Apply to face every morning 60 g 2    atorvastatin (LIPITOR) 80 MG tablet TAKE 1  TABLET(80 MG) BY MOUTH EVERY NIGHT 90 tablet 3    betamethasone dipropionate (DIPROLENE) 0.05 % ointment AAA bid hand under occlusion 45 g 3    blood sugar diagnostic Strp 1 strip by Misc.(Non-Drug; Combo Route) route 2 (two) times daily with meals. 100 strip 3    blood-glucose meter kit Use as instructed 1 each 0    cholecalciferol, vitamin D3, 1,000 unit capsule Take 1 capsule (1,000 Units total) by mouth once daily. (Patient taking differently: Take 1,000 Units by mouth every evening. )  0    clindamycin (CLEOCIN T) 1 % lotion AAA face bid 60 mL 3    clobetasol 0.05% (TEMOVATE) 0.05 % Oint AAA hand bid - under occlusion qhs 60 g 3    clopidogrel (PLAVIX) 75 mg tablet Take 1 tablet (75 mg total) by mouth once daily. 30 tablet 11    diclofenac sodium (SOLARAZE) 3 % gel       fludrocortisone (FLORINEF) 0.1 mg Tab Take 100 mcg by mouth 2 (two) times daily.      gabapentin (NEURONTIN) 600 MG tablet Take 1 tablet (600 mg total) by mouth 2 (two) times daily. 60 tablet 5    ketorolac 0.5% (ACULAR) 0.5 % Drop instill one drop into both eyes every morning  3    lancets Misc 1 lancet by Misc.(Non-Drug; Combo Route) route 2 (two) times daily with meals. 100 each 3    lancing device Misc 1 Device by Misc.(Non-Drug; Combo Route) route 2 (two) times daily with meals. 1 each 0    levETIRAcetam (KEPPRA) 500 MG Tab Take 1 tablet (500 mg total) by mouth 2 (two) times daily. 180 tablet 3    midodrine (PROAMATINE) 5 MG Tab       nortriptyline (PAMELOR) 25 MG capsule TK 1 C PO QPM  7    omeprazole (PRILOSEC) 20 MG capsule TAKE 1 CAPSULE BY MOUTH EVERY DAY 90 capsule 4    polyethylene glycol (GLYCOLAX) 17 gram/dose powder Take 17 g by mouth once daily. Dissolve in juice. 510 g 0    prednisoLONE acetate (PRED FORTE) 1 % DrpS INSTILL ONE DROP INTO  LEFT EYE THREE TIMES A DAY  3    predniSONE (DELTASONE) 10 MG tablet TK 1 T PO ONCE D  2    predniSONE (DELTASONE) 10 MG tablet Take 2 tablets (20 mg total) by mouth once  daily. Take with food.  After 10 days resume 10 mg daily dose. 20 tablet 0    PREVNAR 13, PF, 0.5 mL Syrg ADM 0.5ML IM UTD  0    DENISE-LINDA 0.8 mg Tab TK 1 T PO QD  0    RENVELA 800 mg Tab TAKE 1 TABLET BY MOUTH THREE TIMES DAILY WITH MEALS 90 tablet 0    SENSIPAR 30 mg Tab daily with breakfast.       tiZANidine (ZANAFLEX) 4 MG tablet 1 tablet by mouth every 8-12 hours as needed for muscle spasm 90 tablet 0    warfarin (COUMADIN) 5 MG tablet Take 1 tablet (5 mg total) by mouth Daily. Per [facility] (Patient taking differently: Take 5 mg by mouth every evening. Per [facility]) 90 tablet 3    warfarin (COUMADIN) 5 MG tablet TAKE 1 TABLET BY MOUTH EVERY DAY EXCEPT TAKE 1 1/2 ON MONDAY AND FRIDAY OR AS DIRECTED BY CLINIC 102 tablet 0    colchicine 0.6 mg tablet Take 1 tablet (0.6 mg total) by mouth once. 1 tablet 0     No current facility-administered medications on file prior to visit.            Review of patient's allergies indicates:   Allergen Reactions    Bactrim [sulfamethoxazole-trimethoprim] Other (See Comments)     Causes renal failure    Nsaids (non-steroidal anti-inflammatory drug) Other (See Comments)     Renal failure           Social History     Social History    Marital status: Single     Spouse name: N/A    Number of children: N/A    Years of education: N/A     Occupational History    Not on file.     Social History Main Topics    Smoking status: Former Smoker     Packs/day: 0.50     Years: 10.00     Types: Cigarettes     Quit date: 4/22/1994    Smokeless tobacco: Never Used    Alcohol use No      Comment: rarely    Drug use: No    Sexual activity: Not Currently     Other Topics Concern    Are You Pregnant Or Think You May Be? No    Breast-Feeding No     Social History Narrative    Lives with boyfriend/partner who helps with her care.                Review of Systems   Constitution: Negative for chills, diaphoresis, fever, weakness, malaise/fatigue and night sweats.  "  Cardiovascular: Negative for claudication, cyanosis and leg swelling.   Skin: Positive for dry skin and poor wound healing.   Musculoskeletal:        Abnormal gait, uses rolling walker.    Neurological: Positive for numbness and sensory change. Negative for paresthesias and tremors.           Objective:        Vitals:    07/12/18 1335   BP: 98/68   Pulse: 69   Weight: 118.8 kg (262 lb)   Height: 5' 9" (1.753 m)           Physical Exam   Cardiovascular:   Pulses:       Dorsalis pedis pulses are 2+ on the right side, and 2+ on the left side.        Posterior tibial pulses are 2+ on the right side, and 2+ on the left side.   All toes warm, pink.       Musculoskeletal:    All ten toes without clubbing, cyanosis, or signs of ischemia.  No pain to palpation bilateral lower extremities.  Range of motion, stability, muscle strength, and muscle tone normal bilateral feet and legs.     Lymphadenopathy:   Negative lymphadenopathy bilateral popliteal fossa and tarsal tunnel.  Negative lymphangitic streaking bilateral foot/ankle bilateral.     Neurological:   Diminished/loss of protective sensation all toes bilateral to 10 gram monofilament.     Skin: Lesion noted.   Wound:  Medial right hallux    Size:    1mm x 1mm with overlying callus R great toe.   Fragile epithelium base.  No redness.  No increased temperature. No pus. No other SOI. Surrounding maceration toe entire great toe. Stable.              Assessment:       Encounter Diagnoses   Name Primary?    Type II diabetes mellitus with neurological manifestations Yes    Bilateral lower extremity edema     Toe ulcer, right, limited to breakdown of skin          Plan:       Sisi was seen today for diabetes mellitus, diabetic foot exam and toe ulcer.    Diagnoses and all orders for this visit:    Type II diabetes mellitus with neurological manifestations    Bilateral lower extremity edema    Toe ulcer, right, limited to breakdown of skin      I counseled the patient on " her conditions, their implications and medical management.    Swab wound and entire toe with betadine. Covered with bandaid. Recommended continuation of DM shoes. Avoid tight, stiff shoes.     I warned patient of signs and symptoms of infection including redness, drainage, purulence, odor, streaking, fever, chills, etc and I advised her to seek medical attention (ER or urgent car) if these symptoms arise.     RTC 2-3 weeks for wound check, sooner PRN

## 2018-07-13 ENCOUNTER — DOCUMENTATION ONLY (OUTPATIENT)
Dept: ADMINISTRATIVE | Facility: HOSPITAL | Age: 58
End: 2018-07-13

## 2018-07-13 ENCOUNTER — PATIENT OUTREACH (OUTPATIENT)
Dept: ADMINISTRATIVE | Facility: HOSPITAL | Age: 58
End: 2018-07-13

## 2018-07-13 NOTE — PROGRESS NOTES
Pre-visit audit complete. Health maintenance indicate pt is due for an eye exam.  Contacted pt and inquired if she has had a recent eye exam.  She stated she is scheduled to an eye exam this month with Dr. Whitten, [phone].

## 2018-07-27 ENCOUNTER — ANTI-COAG VISIT (OUTPATIENT)
Dept: CARDIOLOGY | Facility: CLINIC | Age: 58
End: 2018-07-27
Payer: MEDICARE

## 2018-07-27 ENCOUNTER — OFFICE VISIT (OUTPATIENT)
Dept: INTERNAL MEDICINE | Facility: CLINIC | Age: 58
End: 2018-07-27
Payer: MEDICARE

## 2018-07-27 ENCOUNTER — CLINICAL SUPPORT (OUTPATIENT)
Dept: DIABETES | Facility: CLINIC | Age: 58
End: 2018-07-27
Payer: MEDICARE

## 2018-07-27 VITALS
BODY MASS INDEX: 38.59 KG/M2 | SYSTOLIC BLOOD PRESSURE: 108 MMHG | OXYGEN SATURATION: 94 % | TEMPERATURE: 98 F | WEIGHT: 260.56 LBS | DIASTOLIC BLOOD PRESSURE: 70 MMHG | HEIGHT: 69 IN | HEART RATE: 70 BPM

## 2018-07-27 DIAGNOSIS — E04.2 MULTINODULAR THYROID: ICD-10-CM

## 2018-07-27 DIAGNOSIS — L73.9 FOLLICULITIS: ICD-10-CM

## 2018-07-27 DIAGNOSIS — E27.49 SECONDARY ADRENAL INSUFFICIENCY: ICD-10-CM

## 2018-07-27 DIAGNOSIS — E11.8 CONTROLLED TYPE 2 DIABETES MELLITUS WITH COMPLICATION, WITHOUT LONG-TERM CURRENT USE OF INSULIN: ICD-10-CM

## 2018-07-27 DIAGNOSIS — Z23 NEED FOR TDAP VACCINATION: ICD-10-CM

## 2018-07-27 DIAGNOSIS — I73.9 PVD (PERIPHERAL VASCULAR DISEASE): ICD-10-CM

## 2018-07-27 DIAGNOSIS — N18.6 ESRD (END STAGE RENAL DISEASE) ON DIALYSIS: ICD-10-CM

## 2018-07-27 DIAGNOSIS — Z79.01 CURRENT USE OF LONG TERM ANTICOAGULATION: Primary | ICD-10-CM

## 2018-07-27 DIAGNOSIS — Z79.52 CURRENT CHRONIC USE OF SYSTEMIC STEROIDS: ICD-10-CM

## 2018-07-27 DIAGNOSIS — Z99.2 ESRD (END STAGE RENAL DISEASE) ON DIALYSIS: ICD-10-CM

## 2018-07-27 DIAGNOSIS — L97.511 SKIN ULCER OF TOE OF RIGHT FOOT, LIMITED TO BREAKDOWN OF SKIN: ICD-10-CM

## 2018-07-27 DIAGNOSIS — E11.8 CONTROLLED TYPE 2 DIABETES MELLITUS WITH COMPLICATION, WITHOUT LONG-TERM CURRENT USE OF INSULIN: Primary | ICD-10-CM

## 2018-07-27 DIAGNOSIS — D86.0 PULMONARY SARCOIDOSIS: Chronic | ICD-10-CM

## 2018-07-27 LAB — INR PPP: 1.5 (ref 2–3)

## 2018-07-27 PROCEDURE — 3008F BODY MASS INDEX DOCD: CPT | Mod: CPTII,S$GLB,, | Performed by: INTERNAL MEDICINE

## 2018-07-27 PROCEDURE — 99999 PR PBB SHADOW E&M-EST. PATIENT-LVL I: CPT | Mod: PBBFAC,,,

## 2018-07-27 PROCEDURE — G0108 DIAB MANAGE TRN  PER INDIV: HCPCS | Mod: S$GLB,,, | Performed by: DIETITIAN, REGISTERED

## 2018-07-27 PROCEDURE — 99499 UNLISTED E&M SERVICE: CPT | Mod: S$GLB,,, | Performed by: INTERNAL MEDICINE

## 2018-07-27 PROCEDURE — 99999 PR PBB SHADOW E&M-EST. PATIENT-LVL IV: CPT | Mod: PBBFAC,,, | Performed by: INTERNAL MEDICINE

## 2018-07-27 PROCEDURE — 3045F PR MOST RECENT HEMOGLOBIN A1C LEVEL 7.0-9.0%: CPT | Mod: CPTII,S$GLB,, | Performed by: INTERNAL MEDICINE

## 2018-07-27 PROCEDURE — 85610 PROTHROMBIN TIME: CPT | Mod: QW,S$GLB,, | Performed by: INTERNAL MEDICINE

## 2018-07-27 PROCEDURE — 99214 OFFICE O/P EST MOD 30 MIN: CPT | Mod: S$GLB,,, | Performed by: INTERNAL MEDICINE

## 2018-07-27 RX ORDER — SODIUM POLYSTYRENE SULFONATE 4.1 MEQ/G
POWDER, FOR SUSPENSION ORAL; RECTAL
Refills: 1 | COMMUNITY
Start: 2018-07-09 | End: 2018-08-15

## 2018-07-27 RX ORDER — MUPIROCIN 20 MG/G
OINTMENT TOPICAL DAILY
Qty: 30 G | Refills: 0 | Status: SHIPPED | OUTPATIENT
Start: 2018-07-27 | End: 2019-01-01 | Stop reason: SDUPTHER

## 2018-07-27 NOTE — PROGRESS NOTES
INR low today for no apparent reason. Patient reports no bleeding, bruising or other changes. Will boost today and resume with weekly dose until follow up in 1 week for close monitoring. Advised patient to call with any changes or concerns. Care Plan made with Rand Broderick D.

## 2018-07-27 NOTE — Clinical Note
Syeda - I saw Ms Medel today for follow up and wanted to let you know about the swelling/nodule she has at her R 3rd MCP joint. Says she's had this about a month, no tenderness or erythema, no limitation of ROM of fingers/wrist at present. See photo in Media tab and in note.  Thanks! Carlos A Caputo MD

## 2018-07-27 NOTE — PATIENT INSTRUCTIONS
Check in with your dialysis center about getting your TDaP vaccine (tetanus, diphtheria, and pertussis).    Folliculitis  Folliculitis is an inflammation of a hair follicle. A hair follicle is the little pocket where a hair grows out of the skin. Bacteria normally live on the skin. But sometimes bacteria can get trapped in a follicle and cause infection. This causes a bumpy rash. The area over the follicles is red and raised. It may itch or be painful. The bumps may have fluid (pus) inside. The pus may leak and then form crusts. Sores can spread to other areas of the body. Once it goes away, folliculitis can come back at any time. Severe cases may cause permanent hair loss and scarring.  Folliculitis can happen anywhere on the body where hair grows. It can be caused by rubbing from tight clothing. Ingrown hairs can cause it. Soaking in a hot tub or swimming pool that has bacteria in the water can cause it. It may also occur if a hair follicle is blocked by a bandage.  Sores often go away in a few days with no treatment. In some cases, medicine may be given. A small piece of skin or pus may be taken to find the type of bacteria causing the infection.  Home care  The healthcare provider may prescribe an antibiotic cream or ointment.  Oral antibiotics may also be prescribed. Or you may be told to use an over-the-counter antibiotic cream. Follow all instructions when using any of these medicines.  General care:  · Apply warm, moist compresses to the sores for 20 minutes up to 3 times a day. You can make a compress by soaking a cloth in warm water. Squeeze out excess water.  · Dont cut, poke, or squeeze the sores. This can be painful and spread infection.  · Dont scratch the affected area. Scratching can delay healing.  · Dont shave the areas affected by folliculitis.  · If the sores leak fluid, cover the area with a nonstick gauze bandage. Use as little tape as possible. Carefully discard all soiled  bandages.  · Dress in loose cotton clothing.  · Change sheets and blankets if they are soiled by pus. Wash all clothes, towels, sheets, and cloth diapers in soap and hot water. Do not share clothes, towels, or sheets with other family members.  · Do not soak the sores in bath water. This can spread infection. Instead, keep the area clean by gently washing sores with soap and warm water.  · Wash your hands or use antibacterial gels often to prevent spreading the bacteria.  Follow-up care  Follow up with your healthcare provider, or as advised.  When to seek medical advice  Call your healthcare provider right away if any of these occur:  · Fever of 100.4°F (38°C) or higher  · Spreading of the rash  · Rash does not get better with treatment  · Redness or swelling that gets worse  · Rash becomes more painful  · Foul-smelling fluid leaking from the skin  · Rash improves, but then comes back   Date Last Reviewed: 11/1/2016  © 3441-9259 [employer]. [address] [place] [address]. All rights reserved. This information is not intended as a substitute for professional medical care. Always follow your healthcare professional's instructions.          Linagliptin oral tablets  What is this medicine?  Linagliptin (anabel a GLIP tin) helps to treat type 2 diabetes. It helps to control blood sugar. Treatment is combined with diet and exercise.  How should I use this medicine?  Take this medicine by mouth with a glass of water. Follow the directions on the prescription label. You can take it with or without food. Take your dose at the same time each day. Do not take more often than directed. Do not stop taking except on your doctor's advice.  A special MedGuide will be given to you by the pharmacist with each prescription and refill. Be sure to read this information carefully each time.  Talk to your pediatrician regarding the use of this medicine in children. Special care may be needed.  What side effects may  I notice from receiving this medicine?  Side effects that you should report to your doctor or health care professional as soon as possible:  allergic reactions like skin rash, itching or hives, swelling of the face, lips, or tongue  breathing problems  fever, chills  joint pain  nausea, vomiting  signs and symptoms of low blood sugar such as feeling anxious, confusion, dizziness, increased hunger, unusually weak or tired, sweating, shakiness, cold, irritable, headache, blurred vision, fast heartbeat, loss of consciousness  unusual stomach pain or discomfort  vomiting  Side effects that usually do not require medical attention (Report these to your doctor or health care professional if they continue or are bothersome.):  headache  sore throat  stuffy or runny nose  What may interact with this medicine?  Do not take this medicine with any of the following medications:  gatifloxacin  This medicine may also interact with the following medications:  alcohol  bosentan  certain medicines for seizures like carbamazepine, phenobarbital, phenytoin  rifabutin  rifampin  Todd Wort  sulfonylureas like glimepiride, glipizide, glyburide  What if I miss a dose?  If you miss a dose, take it as soon as you can. If it is almost time for your next dose, take only that dose. Do not take double or extra doses.  Where should I keep my medicine?  Keep out of the reach of children.  Store at room temperature between 15 and 30 degrees C (59 and 86 degrees F). Throw away any unused medicine after the expiration date.  What should I tell my health care provider before I take this medicine?  They need to know if you have any of these conditions:  diabetic ketoacidosis  type 1 diabetes  an unusual or allergic reaction to linagliptin, other medicines, foods, dyes, or preservatives  pregnant or trying to get pregnant  breast-feeding  What should I watch for while using this medicine?  Visit your doctor or health care professional for regular  checks on your progress.  A test called the HbA1C (A1C) will be monitored. This is a simple blood test. It measures your blood sugar control over the last 2 to 3 months. You will receive this test every 3 to 6 months.  Learn how to check your blood sugar. Learn the symptoms of low and high blood sugar and how to manage them.  Always carry a quick-source of sugar with you in case you have symptoms of low blood sugar. Examples include hard sugar candy or glucose tablets. Make sure others know that you can choke if you eat or drink when you develop serious symptoms of low blood sugar, such as seizures or unconsciousness. They must get medical help at once.  Tell your doctor or health care professional if you have high blood sugar. You might need to change the dose of your medicine. If you are sick or exercising more than usual, you might need to change the dose of your medicine.  Do not skip meals. Ask your doctor or health care professional if you should avoid alcohol. Many nonprescription cough and cold products contain sugar or alcohol. These can affect blood sugar.  Wear a medical ID bracelet or chain, and carry a card that describes your disease and details of your medicine and dosage times.  NOTE:This sheet is a summary. It may not cover all possible information. If you have questions about this medicine, talk to your doctor, pharmacist, or health care provider. Copyright© 2017 [employer]

## 2018-07-27 NOTE — PROGRESS NOTES
Diabetes Education  Author: Zarina Watson RD  Date: 7/27/2018    Diabetes Education Visit  Diabetes Education Record Assessment/Progress: Comprehensive/Group (f/u)    Diabetes Type  Diabetes Type : Type II (seems initially was steroid induced)    Diabetes History  Diabetes Diagnosis: 0-1 year    Nutrition  Meal Planning:  (still eating only 1 meal daily)  What type of beverages do you drink?: diet soda/tea (only SF drinks now)  Meal Plan 24 Hour Recall - Breakfast:  (half sandwich)  Meal Plan 24 Hour Recall - Lunch:  (usually skips)  Meal Plan 24 Hour Recall - Dinner:  (meat, greens, rice)  Meal Plan 24 Hour Recall - Snack:  (peanut round (reports 8gm carb))    Monitoring   Self Monitoring :  (SMBG once daily; alternates between fasting and pre-dinner, -184; pre-dinner: 153-334)  Blood Glucose Logs: Yes  Do you use a personal glucose monitor?: No  In the last month, how often have you had a low blood sugar reaction?: never  Can you tell when your blood sugar is too high?: no    Exercise   Exercise Type: use exercise equipment    Current Diabetes Treatment   Current Treatment:  (none)    Social History  Preferred Learning Method: Face to Face  Primary Support: Self, Spouse    DDS-2 Score  ( > 3 = SIGNIFICANT DISTRESS): 1    Barriers to Change  Barriers to Change: None  Learning Challenges : None        Diabetes Education Assessment/Progress  Nutrition (Incorporating nutritional management into one's lifestyle): Discussion, Needs Review, Individual Session, Written Materials Provided, Instructed (Reviewed importance of eating small meals throughout the day - she is still only eating one meal daily. Discussed options of foods/snacks amenable to pt - encouraged to eat snack/meal every morning, afternoon, and evening.)    Medications (states correct name, dose, onset, peak, duration, side effects & timing of meds): Discussion, Individual Session, Comprehends Key Points (Remains off DM meds at this time. Seeing PCP  after visit today - will likely start on oral meds.)    Monitoring (monitoring blood glucose/other parameters & using results): Discussion, Instructed, Individual Session, Written Materials Provided, Comprehends Key Points (She has been testing as instructed since last visit - FBG okay, but pre-meal later in the day is quite high. Discussed BG goals and encouraged to continue testing once a day.)    Acute Complications (preventing, detecting, and treating acute complications): Individual Session, Discussion (Not currently on DM meds, no true risk of hypo at this time.)    Chronic Complications (preventing, detecting, and treating chronic complications): Discussion, Individual Session, Instructed, Comprehends Key Points (Reviewed list of possible future complications, as well as worsening of ESRD and CHF with uncontrolled diabetes.)        Goals  Patient has selected/evaluated goals during today's session: Yes, evaluated  Healthy Eating: In Progress (Eliminate SSB and all pickles from diet - has eliminated SSB, but not pickles - doing better still 7/27)         Diabetes Care Plan/Intervention  Education Plan/Intervention: Individual Follow-Up DSMT (2 mo f/u scheduled)        Diabetes Meal Plan  Restrictions: Restricted Carbohydrate, Low Sodium, Low Potassium, Fluid Restriction    Education Units of Time   Time Spent: 30 min    Health Maintenance was reviewed today with patient. Discussed with patient importance of routine eye exams, foot exams/foot care, blood work (i.e.: A1c, microalbumin, and lipid), dental visits, yearly flu vaccine, and pneumonia vaccine as indicated by PCP. Patient verbalized understanding.     Health Maintenance Topics with due status: Not Due       Topic Last Completion Date    Colonoscopy 10/01/2011    Pneumococcal PPSV23 (Medium Risk) 06/14/2016    Pap Smear with HPV Cotest 08/16/2016    Influenza Vaccine 09/01/2016    DEXA SCAN 04/07/2017    Mammogram 08/25/2017    Lipid Panel 12/08/2017     Hemoglobin A1c 06/13/2018    Foot Exam 06/19/2018     Health Maintenance Due   Topic Date Due    Eye Exam  11/27/2016    TETANUS VACCINE  02/07/2018

## 2018-07-27 NOTE — PROGRESS NOTES
Subjective:       Patient ID: Sisi Medel is a 57 y.o. female.    Chief Complaint: Follow-up    HPI  58 y/o woman with h/o sarcoidosis, chronic combined CHF (most recent EF 40-45%), PAF, HTN, ESRD on HD MWF, obesity, YOANDY, chronic respiratory failure, OA, DM2 here for follow up.    DM2 / h/o borderline DM - A1c ranging from 5.5 to 6.7 over past 4 years, then increased to 7.4 on recheck 6/13/18.   Did see DM educator early this year and again 6/13/18. Has been checking her glucose at home since early July, and started tracking her food intake in the past 1-2 weeks. Working on healthy diet, cutting down on sweets, and eating more regular small meals.  On chronic steroids - of note dose was increased to 20mg (from 10) for 10 days starting 6/18/18.  Follows with Dr Mcintyre for eye exam - planned for exam next week  Saw podiatry last 7/12/18 - following closely with Dr Hamlin for wound on R great toe.  Denies any history of pancreatitis           Secondary adrenal insufficiency - saw Endocrine for this, on fludricortisone; per note could taper once off prednisone but this is a long-term chronic medication.    Following with [facility] / Ortho for wounds on hands, neuropathy / pain. Notes swelling in knuckle on 3rd finger right hand since her last visit with Hand Clinic on 6/4/18. Not currently painful, no problems with movement in fingers, but has noted darkening of skin on hand.  Also has slow-healing wounds/ulcers in web space between thumb and index finger on both hands; L hand has mostly healed, R hand healing more slowly.    Atrial fibrillation, CHF - afib onset 6/2016, underwent JARAD/DC cardioversion at that time but has been in paroxysmal AF since. Follows with cardiology and with coumadin clinic.  No new symptoms. Saw Dr Chavarria 3/26/18, recommended for f/u at 6 months.    ESRD on HD TTS - follows with Dr Aaron Tesfaye for dialysis.  Follows with Vascular Surgery here for graft problems.  Anuric. Taking  "renvela. Elevated PTH.  Low vitamin D - takes supplement    Sarcoidosis, CRLD, chronic hypoxia - follows with Dr Harvey with Pulmonology. On chronic steroids (dose increased x 10 days at last visit 6/2018), on chronic home O2.  Feels her breathing improves each time she is on a higher dose of steroids, then worsens again when she goes back down to 10mg daily.     Seizure disorder - on keppra, following with neurology for this as well as for her neuropathy.   Takes gabapentin for peripheral neuropathy, voltaren gel also added at visit with Dr Lester 5/2018. Recommended for follow up 8/2018.  H/o TIA 12/2017 - noted as TIA induced by hypotension, no abnormalities on CTA at that time.    Looking forward to her son's wedding this fall, also planning to go on a cruise with her mother (on a "dialysis cruise" that offers on-board HD) next summer.     Review of Systems   Constitutional: Positive for fatigue (chronic). Negative for activity change, appetite change and fever.   HENT: Negative.    Eyes: Negative for visual disturbance.   Respiratory: Positive for shortness of breath (baseline, as noted). Negative for cough and wheezing.    Cardiovascular: Negative for chest pain, palpitations and leg swelling.   Gastrointestinal: Negative for abdominal pain, constipation, diarrhea and nausea.   Endocrine: Negative for polydipsia and polyuria.   Genitourinary: Positive for decreased urine volume (little to no urination (on HD, chronic)).   Musculoskeletal: Positive for arthralgias (improved), gait problem (uses walker at baseline), joint swelling (as noted) and myalgias (muscle aches, foot pain, foot cramps, neck cramps since starting HD; chronic).   Skin: Negative for rash.        As noted   Neurological: Positive for numbness (paresthesias / burning, both feet, also hands). Negative for dizziness, seizures, speech difficulty, weakness (generalized, intermittent; not worse currently) and light-headedness. " "  Psychiatric/Behavioral: Negative for dysphoric mood (depressed mood related to health issues somes but currently doing well).         Past medical history, surgical history, and family medical history reviewed and updated as appropriate.    Medications and allergies reviewed.     Objective:          Vitals:    07/27/18 1331   BP: 108/70   BP Location: Right arm   Patient Position: Sitting   Pulse: 70   Temp: 97.5 °F (36.4 °C)   TempSrc: Oral   SpO2: (!) 94%   Weight: 118.2 kg (260 lb 9.3 oz)   Height: 5' 9" (1.753 m)     Body mass index is 38.48 kg/m².  Physical Exam   Constitutional: She is oriented to person, place, and time. She appears well-developed and well-nourished. No distress.   Sitting comfortably, speaking in full sentences. Wearing portable O2.    HENT:   Head: Normocephalic and atraumatic.   Mouth/Throat: Oropharynx is clear and moist.   Eyes: Conjunctivae and EOM are normal. Pupils are equal, round, and reactive to light.   Neck: Neck supple. No JVD present.   Cardiovascular: Normal rate, regular rhythm and normal heart sounds.    No murmur heard.  Pulmonary/Chest: Effort normal and breath sounds normal. No respiratory distress. She has no wheezes. She has no rales.   Abdominal: Soft. Bowel sounds are normal. There is no tenderness.   Musculoskeletal: She exhibits no edema or tenderness.   Firm enlargement of R 3rd MCP as noted   Lymphadenopathy:     She has no cervical adenopathy.   Neurological: She is alert and oriented to person, place, and time. She has normal strength. No cranial nerve deficit or sensory deficit. Gait normal.   Skin: Skin is warm and dry. She is not diaphoretic.   Healed wound L hand web space; partially healed wound R hand web space as shown in photo  +small hyperpigmented nodules (2-3mm) at hair follicles on lateral lower legs, no erythema, tenderness, or discharge   Psychiatric: She has a normal mood and affect.   Vitals reviewed.            Lab Results   Component Value " Date    WBC 12.56 03/26/2018    HGB 14.7 03/26/2018    HCT 48.0 03/26/2018     03/26/2018    CHOL 169 12/08/2017    TRIG 98 12/08/2017    HDL 58 12/08/2017    ALT 18 03/26/2018    AST 16 03/26/2018     (L) 03/26/2018    K 5.2 (H) 03/26/2018    CL 90 (L) 03/26/2018    CREATININE 8.9 (H) 03/26/2018    BUN 76 (H) 03/26/2018    CO2 23 03/26/2018    TSH 1.444 04/08/2017    INR 1.5 07/27/2018    HGBA1C 7.4 (H) 06/13/2018       Assessment:       1. Controlled type 2 diabetes mellitus with complication, without long-term current use of insulin    2. Folliculitis    3. Multinodular thyroid    4. Secondary adrenal insufficiency    5. Pulmonary sarcoidosis    6. Current chronic use of systemic steroids    7. Need for Tdap vaccination    8. ESRD (end stage renal disease) on dialysis        Plan:   Sisi was seen today for follow-up.    Diagnoses and all orders for this visit:    Controlled type 2 diabetes mellitus with complication, without long-term current use of insulin - now monitoring BG regularly, starting to track diet.   Discussed options - given that she is on chronic steroids and plans to discuss increasing baseline dose with her pulmonologist, will start medication.  No metformin given her renal disease; will also avoid sulfonylureas as these would have higher risk of hypoglycemia especially given ESRD. Will start tradjenta once daily.  Continue to monitor at home  Also referring to endocrinology for ongoing diabetes care   -     Ambulatory Referral to Endocrinology  -     linagliptin (TRADJENTA) 5 mg Tab tablet; Take 1 tablet (5 mg total) by mouth once daily.  -     Comprehensive metabolic panel; Future  -     Hemoglobin A1c; Future    Folliculitis  -     mupirocin (BACTROBAN) 2 % ointment; Apply topically once daily. To areas of infection/inflammation on leg    Multinodular thyroid - check in with Endocrine team about this as well    Secondary adrenal insufficiency - has seen Endocrine once for this  and discussed tapering off fludricortisone if she was able to go off chronic steroids, but this does not seem likely. Recommend follow up with Endocrine for this as well.    Pulmonary sarcoidosis - she plans to schedule appt with Dr Harvey to discuss breathing problems and possibly increasing chronic steroid dose    Current chronic use of systemic steroids - monitoring as above    Need for Tdap vaccination - due for this but insurance does not cover through clinic. Given her lung disease, I do feel that it is important for her to be up to date on her pertussis vaccine -- recommended she check in with Dr Harvey and with her HD center about this    ESRD (end stage renal disease) on dialysis - continue to follow with nephrology at dialysis.    PVD - continue to follow with Vascular    Toe ulcer - follow with podiatry as noted    Health maintenance reviewed with patient as noted.  Records requested from Dr Mcintyre - she will also request note be sent over when she sees them next week.    Follow-up in about 3 months (around 10/27/2018) for annual physical.    Carlos A Caputo MD  Internal Medicine  [facility]  7/27/2018

## 2018-07-30 ENCOUNTER — TELEPHONE (OUTPATIENT)
Dept: ENDOCRINOLOGY | Facility: CLINIC | Age: 58
End: 2018-07-30

## 2018-07-30 NOTE — TELEPHONE ENCOUNTER
Pt informed Dr Broussard is no longer her. Scheduled visit for Nov 15th  at 9:00am with Alena Medel NP.  Her PCP has labs ordered in October that we would need for the visit.

## 2018-07-30 NOTE — TELEPHONE ENCOUNTER
----- Message from Huma Figueroa sent at 7/27/2018  3:53 PM CDT -----  Contact: Self [phone]  Patient is requesting a call back to schedule an appointment from a referral to follow up on controlled type II diabetes.    I attempted to schedule but was not given any times.    Patient may be reached at [phone].    Thank you.  CHRISTINA

## 2018-08-03 ENCOUNTER — LAB VISIT (OUTPATIENT)
Dept: LAB | Facility: HOSPITAL | Age: 58
End: 2018-08-03
Payer: MEDICARE

## 2018-08-03 ENCOUNTER — OFFICE VISIT (OUTPATIENT)
Dept: PODIATRY | Facility: CLINIC | Age: 58
End: 2018-08-03
Payer: MEDICARE

## 2018-08-03 ENCOUNTER — ANTI-COAG VISIT (OUTPATIENT)
Dept: CARDIOLOGY | Facility: CLINIC | Age: 58
End: 2018-08-03

## 2018-08-03 VITALS
DIASTOLIC BLOOD PRESSURE: 81 MMHG | SYSTOLIC BLOOD PRESSURE: 116 MMHG | WEIGHT: 260 LBS | HEART RATE: 70 BPM | BODY MASS INDEX: 38.4 KG/M2

## 2018-08-03 DIAGNOSIS — Z79.01 CURRENT USE OF LONG TERM ANTICOAGULATION: ICD-10-CM

## 2018-08-03 DIAGNOSIS — E11.49 TYPE II DIABETES MELLITUS WITH NEUROLOGICAL MANIFESTATIONS: Primary | ICD-10-CM

## 2018-08-03 DIAGNOSIS — R60.0 BILATERAL LOWER EXTREMITY EDEMA: ICD-10-CM

## 2018-08-03 DIAGNOSIS — Z87.2 HEALED ULCER OF RIGHT FOOT ON EXAMINATION: ICD-10-CM

## 2018-08-03 LAB
INR PPP: 2.8
PROTHROMBIN TIME: 27.5 SEC

## 2018-08-03 PROCEDURE — 3045F PR MOST RECENT HEMOGLOBIN A1C LEVEL 7.0-9.0%: CPT | Mod: CPTII,S$GLB,, | Performed by: PODIATRIST

## 2018-08-03 PROCEDURE — 99213 OFFICE O/P EST LOW 20 MIN: CPT | Mod: S$GLB,,, | Performed by: PODIATRIST

## 2018-08-03 PROCEDURE — 3008F BODY MASS INDEX DOCD: CPT | Mod: CPTII,S$GLB,, | Performed by: PODIATRIST

## 2018-08-03 PROCEDURE — 99999 PR PBB SHADOW E&M-EST. PATIENT-LVL III: CPT | Mod: PBBFAC,,, | Performed by: PODIATRIST

## 2018-08-03 PROCEDURE — 36415 COLL VENOUS BLD VENIPUNCTURE: CPT

## 2018-08-03 PROCEDURE — 85610 PROTHROMBIN TIME: CPT

## 2018-08-06 NOTE — PROGRESS NOTES
"Subjective:      Patient ID: Sisi Medel is a 57 y.o. female.    Chief Complaint: Diabetes Mellitus (bilateral pcp Dr Caputo 7/27/18); Diabetic Foot Exam; and toe ulcer (rt great toe)    follow up Wound right great toe.  Relates improvement. Has been applying Betadine to the wound. Admits to stealing betadine packets from drawer because "its too expensive at the store". Here for wound check. No other complaints today.       Past Medical History:   Diagnosis Date    Anemia in ESRD (end-stage renal disease) 3/7/2016    Anticoagulant long-term use     Cervical radiculopathy 7/20/2015    CHF (congestive heart failure)     Chronic combined systolic and diastolic heart failure 6/14/2013    EF 20% 2013, improved with Medical therapy, negative PET 2013    Chronic respiratory failure with hypoxia 04/22/2013    On home oxygen at 2-5 liters    Closed fracture of proximal end of right fibula 6/27/2016    Complications due to renal dialysis device, implant, and graft     DDD (degenerative disc disease), lumbar 6/17/2015    Dependence on renal dialysis     Diabetes mellitus type II, controlled 12/8/2017    ESRD on hemodialysis 2/23/2016    Essential hypertension     Gout     Lateral meniscal tear 5/31/2016    Lumbar stenosis 6/17/2015    Pacemaker     Paroxysmal atrial fibrillation 5/16/2014    Not on anticoagulation    Peripheral neuropathy 11/13/2013    Personal history of gastric ulcer 3/19/2013    Sarcoidosis, lung 2009    Diagnosed in 2009 with ocular manifestation, on 4L home O2 and PO steroids    Secondary pulmonary hypertension 4/7/2015    Seizure disorder 3/19/2013    Seizures     Shingles 11/13/2013    Thyroid disease            Current Outpatient Prescriptions on File Prior to Visit   Medication Sig Dispense Refill    ACZONE 5 % topical gel Apply to face every morning 60 g 2    atorvastatin (LIPITOR) 80 MG tablet TAKE 1 TABLET(80 MG) BY MOUTH EVERY NIGHT 90 tablet 3    " betamethasone dipropionate (DIPROLENE) 0.05 % ointment AAA bid hand under occlusion 45 g 3    blood sugar diagnostic Strp 1 strip by Misc.(Non-Drug; Combo Route) route 2 (two) times daily with meals. 100 strip 3    blood-glucose meter kit Use as instructed 1 each 0    cholecalciferol, vitamin D3, 1,000 unit capsule Take 1 capsule (1,000 Units total) by mouth once daily. (Patient taking differently: Take 1,000 Units by mouth every evening. )  0    clindamycin (CLEOCIN T) 1 % lotion AAA face bid 60 mL 3    clobetasol 0.05% (TEMOVATE) 0.05 % Oint AAA hand bid - under occlusion qhs 60 g 3    clopidogrel (PLAVIX) 75 mg tablet Take 1 tablet (75 mg total) by mouth once daily. 30 tablet 11    diclofenac sodium (SOLARAZE) 3 % gel       fludrocortisone (FLORINEF) 0.1 mg Tab Take 100 mcg by mouth 2 (two) times daily.      gabapentin (NEURONTIN) 600 MG tablet Take 1 tablet (600 mg total) by mouth 2 (two) times daily. 60 tablet 5    ketorolac 0.5% (ACULAR) 0.5 % Drop instill one drop into both eyes every morning  3    lancets Misc 1 lancet by Misc.(Non-Drug; Combo Route) route 2 (two) times daily with meals. 100 each 3    lancing device Misc 1 Device by Misc.(Non-Drug; Combo Route) route 2 (two) times daily with meals. 1 each 0    levETIRAcetam (KEPPRA) 500 MG Tab Take 1 tablet (500 mg total) by mouth 2 (two) times daily. 180 tablet 3    linagliptin (TRADJENTA) 5 mg Tab tablet Take 1 tablet (5 mg total) by mouth once daily. 90 tablet 1    midodrine (PROAMATINE) 5 MG Tab       mupirocin (BACTROBAN) 2 % ointment Apply topically once daily. To areas of infection/inflammation on leg 30 g 0    nortriptyline (PAMELOR) 25 MG capsule TK 1 C PO QPM  7    omeprazole (PRILOSEC) 20 MG capsule TAKE 1 CAPSULE BY MOUTH EVERY DAY 90 capsule 4    polyethylene glycol (GLYCOLAX) 17 gram/dose powder Take 17 g by mouth once daily. Dissolve in juice. 510 g 0    prednisoLONE acetate (PRED FORTE) 1 % DrpS INSTILL ONE DROP INTO  LEFT  EYE THREE TIMES A DAY  3    predniSONE (DELTASONE) 10 MG tablet TK 1 T PO ONCE D  2    predniSONE (DELTASONE) 10 MG tablet Take 2 tablets (20 mg total) by mouth once daily. Take with food.  After 10 days resume 10 mg daily dose. 20 tablet 0    PREVNAR 13, PF, 0.5 mL Syrg ADM 0.5ML IM UTD  0    DENISE-LINDA 0.8 mg Tab TK 1 T PO QD  0    RENVELA 800 mg Tab TAKE 1 TABLET BY MOUTH THREE TIMES DAILY WITH MEALS 90 tablet 0    SENSIPAR 30 mg Tab daily with breakfast.       sodium polystyrene (KAYEXALATE) powder TK 30 GM PO QD FOR 30 DAYS  1    tiZANidine (ZANAFLEX) 4 MG tablet 1 tablet by mouth every 8-12 hours as needed for muscle spasm 90 tablet 0    warfarin (COUMADIN) 5 MG tablet Take 1 tablet (5 mg total) by mouth Daily. Per [facility]madin Clinic (Patient taking differently: Take 5 mg by mouth every evening. Per [facility]madin Clinic) 90 tablet 3    warfarin (COUMADIN) 5 MG tablet TAKE 1 TABLET BY MOUTH EVERY DAY EXCEPT TAKE 1 1/2 ON MONDAY AND FRIDAY OR AS DIRECTED BY CLINIC 102 tablet 0    colchicine 0.6 mg tablet Take 1 tablet (0.6 mg total) by mouth once. 1 tablet 0     No current facility-administered medications on file prior to visit.            Review of patient's allergies indicates:   Allergen Reactions    Bactrim [sulfamethoxazole-trimethoprim] Other (See Comments)     Causes renal failure    Nsaids (non-steroidal anti-inflammatory drug) Other (See Comments)     Renal failure           Social History     Social History    Marital status: Single     Spouse name: N/A    Number of children: N/A    Years of education: N/A     Occupational History    Not on file.     Social History Main Topics    Smoking status: Former Smoker     Packs/day: 0.50     Years: 10.00     Types: Cigarettes     Quit date: 4/22/1994    Smokeless tobacco: Never Used    Alcohol use No      Comment: rarely    Drug use: No    Sexual activity: Not Currently     Other Topics Concern    Are You Pregnant Or Think You May Be? No     Breast-Feeding No     Social History Narrative    Lives with boyfriend/partner who helps with her care.                Review of Systems   Constitution: Negative for chills, diaphoresis, fever, weakness, malaise/fatigue and night sweats.   Cardiovascular: Negative for claudication, cyanosis and leg swelling.   Skin: Positive for dry skin and poor wound healing.   Musculoskeletal:        Abnormal gait, uses rolling walker.    Neurological: Positive for numbness and sensory change. Negative for paresthesias and tremors.           Objective:        Vitals:    08/03/18 1027   BP: 116/81   Pulse: 70   Weight: 117.9 kg (260 lb)           Physical Exam   Cardiovascular:   Pulses:       Dorsalis pedis pulses are 2+ on the right side, and 2+ on the left side.        Posterior tibial pulses are 2+ on the right side, and 2+ on the left side.   All toes warm, pink.       Musculoskeletal:    All ten toes without clubbing, cyanosis, or signs of ischemia.  No pain to palpation bilateral lower extremities.  Range of motion, stability, muscle strength, and muscle tone normal bilateral feet and legs.     Lymphadenopathy:   Negative lymphadenopathy bilateral popliteal fossa and tarsal tunnel.  Negative lymphangitic streaking bilateral foot/ankle bilateral.     Neurological:   Diminished/loss of protective sensation all toes bilateral to 10 gram monofilament.     Skin: Lesion noted.   Wound:  Medial right hallux. Size:  0 x 0 x 0cm fully healed epithelium. No furhter wound noted to R great toe. Stable.              Assessment:       Encounter Diagnoses   Name Primary?    Type II diabetes mellitus with neurological manifestations Yes    Bilateral lower extremity edema     Healed ulcer of right foot on examination          Plan:       Sisi was seen today for diabetes mellitus, diabetic foot exam and toe ulcer.    Diagnoses and all orders for this visit:    Type II diabetes mellitus with neurological manifestations    Bilateral  lower extremity edema    Healed ulcer of right foot on examination      I counseled the patient on her conditions, their implications and medical management.    Wound assessed and examined. Noted to be healed today. No further wounds noted b/l MARYELLEN.     Discussed regular and routine moisturizer to skin of both feet to help improve dry skin. Advised to apply twice daily until resolution of symptoms. Avoid between toes.     Long discussion with patient regarding appropriate, supportive and comfortable shoes. Recommended SAS/Easy Spirit style shoe brands with adequate arch supports to alleviate abnormal pressure and improve stability of foot while walking. Avoid flat shoes and barefoot walking as these will exacerbate or worsen symptoms. Highly recommend using DM shoes that she already has.     I warned patient of signs and symptoms of reopening of wound along with signs of infection including redness, drainage, purulence, odor, streaking, fever, chills, etc and I advised her to seek medical attention (ER or urgent car) if these symptoms arise.     RTC 9 weeks for DM foot check, sooner PRN especially if any wound reopens or new one arises.

## 2018-08-09 ENCOUNTER — TELEPHONE (OUTPATIENT)
Dept: NEUROLOGY | Facility: CLINIC | Age: 58
End: 2018-08-09

## 2018-08-12 DIAGNOSIS — I48.0 PAROXYSMAL ATRIAL FIBRILLATION: ICD-10-CM

## 2018-08-12 DIAGNOSIS — Z79.01 LONG TERM CURRENT USE OF ANTICOAGULANT THERAPY: ICD-10-CM

## 2018-08-12 RX ORDER — WARFARIN SODIUM 5 MG/1
TABLET ORAL
Qty: 40 TABLET | Refills: 0 | Status: SHIPPED | OUTPATIENT
Start: 2018-08-12 | End: 2018-01-01 | Stop reason: SDUPTHER

## 2018-08-15 ENCOUNTER — OFFICE VISIT (OUTPATIENT)
Dept: TRANSPLANT | Facility: CLINIC | Age: 58
End: 2018-08-15
Payer: MEDICARE

## 2018-08-15 ENCOUNTER — OFFICE VISIT (OUTPATIENT)
Dept: NEUROLOGY | Facility: CLINIC | Age: 58
End: 2018-08-15
Payer: MEDICARE

## 2018-08-15 ENCOUNTER — HOSPITAL ENCOUNTER (OUTPATIENT)
Dept: CARDIOLOGY | Facility: CLINIC | Age: 58
Discharge: HOME | End: 2018-08-15
Attending: NURSE PRACTITIONER
Payer: MEDICARE

## 2018-08-15 ENCOUNTER — ANTI-COAG VISIT (OUTPATIENT)
Dept: CARDIOLOGY | Facility: CLINIC | Age: 58
End: 2018-08-15

## 2018-08-15 ENCOUNTER — LAB VISIT (OUTPATIENT)
Dept: LAB | Facility: HOSPITAL | Age: 58
End: 2018-08-15
Attending: INTERNAL MEDICINE
Payer: MEDICARE

## 2018-08-15 ENCOUNTER — OFFICE VISIT (OUTPATIENT)
Dept: VASCULAR SURGERY | Facility: CLINIC | Age: 58
End: 2018-08-15
Attending: SURGERY
Payer: MEDICARE

## 2018-08-15 ENCOUNTER — HOSPITAL ENCOUNTER (OUTPATIENT)
Dept: VASCULAR SURGERY | Facility: CLINIC | Age: 58
Discharge: HOME | End: 2018-08-15
Attending: SURGERY
Payer: MEDICARE

## 2018-08-15 VITALS
HEART RATE: 69 BPM | HEIGHT: 66 IN | SYSTOLIC BLOOD PRESSURE: 118 MMHG | WEIGHT: 262.38 LBS | DIASTOLIC BLOOD PRESSURE: 78 MMHG | BODY MASS INDEX: 42.17 KG/M2 | TEMPERATURE: 98 F

## 2018-08-15 VITALS
OXYGEN SATURATION: 91 % | HEIGHT: 68 IN | BODY MASS INDEX: 39.9 KG/M2 | DIASTOLIC BLOOD PRESSURE: 78 MMHG | SYSTOLIC BLOOD PRESSURE: 123 MMHG | WEIGHT: 263.25 LBS | HEART RATE: 70 BPM

## 2018-08-15 VITALS
BODY MASS INDEX: 38.5 KG/M2 | DIASTOLIC BLOOD PRESSURE: 90 MMHG | HEIGHT: 69 IN | SYSTOLIC BLOOD PRESSURE: 123 MMHG | WEIGHT: 259.94 LBS | HEART RATE: 69 BPM

## 2018-08-15 DIAGNOSIS — I73.9 PVD (PERIPHERAL VASCULAR DISEASE): ICD-10-CM

## 2018-08-15 DIAGNOSIS — G40.909 SEIZURE DISORDER: Primary | Chronic | ICD-10-CM

## 2018-08-15 DIAGNOSIS — Z99.2 END STAGE RENAL FAILURE ON DIALYSIS: ICD-10-CM

## 2018-08-15 DIAGNOSIS — Z95.0 BIVENTRICULAR CARDIAC PACEMAKER IN SITU: ICD-10-CM

## 2018-08-15 DIAGNOSIS — E78.2 MIXED HYPERLIPIDEMIA: Chronic | ICD-10-CM

## 2018-08-15 DIAGNOSIS — E87.5 HYPERKALEMIA: ICD-10-CM

## 2018-08-15 DIAGNOSIS — I27.29 OTHER SECONDARY PULMONARY HYPERTENSION: Chronic | ICD-10-CM

## 2018-08-15 DIAGNOSIS — Z01.818 PRE-OP EVALUATION: Primary | ICD-10-CM

## 2018-08-15 DIAGNOSIS — I50.22 CHRONIC SYSTOLIC HEART FAILURE: ICD-10-CM

## 2018-08-15 DIAGNOSIS — Z79.01 CURRENT USE OF LONG TERM ANTICOAGULATION: ICD-10-CM

## 2018-08-15 DIAGNOSIS — D86.0 PULMONARY SARCOIDOSIS: Chronic | ICD-10-CM

## 2018-08-15 DIAGNOSIS — T82.858D AV GRAFT STENOSIS, SUBSEQUENT ENCOUNTER: Primary | ICD-10-CM

## 2018-08-15 DIAGNOSIS — T82.858D STENOSIS OF ARTERIOVENOUS DIALYSIS FISTULA, SUBSEQUENT ENCOUNTER: ICD-10-CM

## 2018-08-15 DIAGNOSIS — N18.6 END STAGE RENAL FAILURE ON DIALYSIS: Chronic | ICD-10-CM

## 2018-08-15 DIAGNOSIS — E66.01 MORBID OBESITY WITH BMI OF 40.0-44.9, ADULT: Chronic | ICD-10-CM

## 2018-08-15 DIAGNOSIS — I50.42 CHRONIC COMBINED SYSTOLIC AND DIASTOLIC HEART FAILURE: Chronic | ICD-10-CM

## 2018-08-15 DIAGNOSIS — I50.42 CHRONIC COMBINED SYSTOLIC AND DIASTOLIC HEART FAILURE: Primary | Chronic | ICD-10-CM

## 2018-08-15 DIAGNOSIS — Z79.52 CURRENT CHRONIC USE OF SYSTEMIC STEROIDS: Chronic | ICD-10-CM

## 2018-08-15 DIAGNOSIS — N18.6 END STAGE RENAL FAILURE ON DIALYSIS: ICD-10-CM

## 2018-08-15 DIAGNOSIS — G63 POLYNEUROPATHY ASSOCIATED WITH UNDERLYING DISEASE: ICD-10-CM

## 2018-08-15 DIAGNOSIS — J96.11 CHRONIC RESPIRATORY FAILURE WITH HYPOXIA: ICD-10-CM

## 2018-08-15 DIAGNOSIS — Z99.2 END STAGE RENAL FAILURE ON DIALYSIS: Chronic | ICD-10-CM

## 2018-08-15 LAB
ALBUMIN SERPL BCP-MCNC: 3.4 G/DL
ALP SERPL-CCNC: 169 U/L
ALT SERPL W/O P-5'-P-CCNC: 20 U/L
ANION GAP SERPL CALC-SCNC: 14 MMOL/L
AST SERPL-CCNC: 26 U/L
BILIRUB SERPL-MCNC: 0.5 MG/DL
BNP SERPL-MCNC: 674 PG/ML
BUN SERPL-MCNC: 47 MG/DL
CALCIUM SERPL-MCNC: 10.3 MG/DL
CHLORIDE SERPL-SCNC: 94 MMOL/L
CO2 SERPL-SCNC: 28 MMOL/L
CREAT SERPL-MCNC: 7.8 MG/DL
EST. GFR  (AFRICAN AMERICAN): 6.1 ML/MIN/1.73 M^2
EST. GFR  (NON AFRICAN AMERICAN): 5.3 ML/MIN/1.73 M^2
ESTIMATED PA SYSTOLIC PRESSURE: 37.11
GLUCOSE SERPL-MCNC: 201 MG/DL
INR PPP: 3.3
MAGNESIUM SERPL-MCNC: 2.7 MG/DL
MITRAL VALVE REGURGITATION: ABNORMAL
POTASSIUM SERPL-SCNC: 6.5 MMOL/L
PROT SERPL-MCNC: 8 G/DL
PROTHROMBIN TIME: 32.4 SEC
RETIRED EF AND QEF - SEE NOTES: 35 (ref 55–65)
SODIUM SERPL-SCNC: 136 MMOL/L

## 2018-08-15 PROCEDURE — 3008F BODY MASS INDEX DOCD: CPT | Mod: CPTII,S$GLB,, | Performed by: INTERNAL MEDICINE

## 2018-08-15 PROCEDURE — 36415 COLL VENOUS BLD VENIPUNCTURE: CPT

## 2018-08-15 PROCEDURE — 80053 COMPREHEN METABOLIC PANEL: CPT

## 2018-08-15 PROCEDURE — 99214 OFFICE O/P EST MOD 30 MIN: CPT | Mod: S$GLB,,, | Performed by: PSYCHIATRY & NEUROLOGY

## 2018-08-15 PROCEDURE — 83735 ASSAY OF MAGNESIUM: CPT

## 2018-08-15 PROCEDURE — 93306 TTE W/DOPPLER COMPLETE: CPT | Mod: S$GLB,,, | Performed by: INTERNAL MEDICINE

## 2018-08-15 PROCEDURE — 83880 ASSAY OF NATRIURETIC PEPTIDE: CPT

## 2018-08-15 PROCEDURE — 99999 PR PBB SHADOW E&M-EST. PATIENT-LVL III: CPT | Mod: PBBFAC,,, | Performed by: INTERNAL MEDICINE

## 2018-08-15 PROCEDURE — 3008F BODY MASS INDEX DOCD: CPT | Mod: CPTII,S$GLB,, | Performed by: SURGERY

## 2018-08-15 PROCEDURE — 3008F BODY MASS INDEX DOCD: CPT | Mod: CPTII,S$GLB,, | Performed by: PSYCHIATRY & NEUROLOGY

## 2018-08-15 PROCEDURE — 99999 PR PBB SHADOW E&M-EST. PATIENT-LVL V: CPT | Mod: PBBFAC,,, | Performed by: SURGERY

## 2018-08-15 PROCEDURE — 99999 PR PBB SHADOW E&M-EST. PATIENT-LVL V: CPT | Mod: PBBFAC,,, | Performed by: PSYCHIATRY & NEUROLOGY

## 2018-08-15 PROCEDURE — 93990 DOPPLER FLOW TESTING: CPT | Mod: S$GLB,,, | Performed by: SURGERY

## 2018-08-15 PROCEDURE — 99214 OFFICE O/P EST MOD 30 MIN: CPT | Mod: S$GLB,,, | Performed by: SURGERY

## 2018-08-15 PROCEDURE — 85610 PROTHROMBIN TIME: CPT

## 2018-08-15 PROCEDURE — 99214 OFFICE O/P EST MOD 30 MIN: CPT | Mod: S$GLB,,, | Performed by: INTERNAL MEDICINE

## 2018-08-15 RX ORDER — NORTRIPTYLINE HYDROCHLORIDE 25 MG/1
25 CAPSULE ORAL NIGHTLY
Qty: 90 CAPSULE | Refills: 1 | Status: SHIPPED | OUTPATIENT
Start: 2018-08-15 | End: 2018-01-01 | Stop reason: SDUPTHER

## 2018-08-15 NOTE — PATIENT INSTRUCTIONS
Swig by the pharmacy and  the liquid kayexalate to lower your potassium    Watch the high potassium food- oranges, banana, potato,     Keep salt intake to under 2000 mg sodium, fluids to under 2 L (64 oz)    Call us if you find yourself getting more short of breath, have more swelling or unexpected weight changes

## 2018-08-15 NOTE — PATIENT INSTRUCTIONS
YOU WILL BE CONTACTED BY [facility] ABOUT COMPOUND CREAM FOR FEET TO USE AS NEEDED    START PAMELOR 1 TAB AT BED TIME.

## 2018-08-15 NOTE — PROGRESS NOTES
Sisi Medel  8/15/2018    HPI:  Patient is a 55 y.o.  female with a h/o HTN, HLD, CHF (EF 20%), COPD on home O2, paroxysmal Afib (on Coumadin), seizure disorder, ESRD on HD TThS via LUE AVG (placed 2/3/16) who presents to clinic today for follow up. She was last seen in our clinic on 2018, and recently underwent the followin.  Left upper extremity fistulogram.  2.  PTA of mid graft stenosis x2, left upper extremity graft with 8 x 40   [place] balloon.  3.  PTA of left upper extremity AV graft outflow stenosis with a 10 x 40 [place]   balloon.  4.  PTA of left upper extremity AV graft in-stent restenosis of venous outflow   with 8 x 40 [place] balloon.    She has done well since her last visit, however, still continuing to have intermittent elevated venous pressure on the HD circuit. She is able to reliably complete each HD session.  She is feeling well with no complaints today. Ultrasound shows no significant areas of concern.    S/p   2/3/16: LUE AVG creation (Dr Villafana)  16: Percutaneous mechanical thrombectomy w [place] Angiojet AVX; 4fr OTW embolectomy of arterial inflow   PTA outflow stenosis with a 7x40 mm balloon  10/12/16: fistulogram (75% stenosis noted), left upper extremity AV graft PTA venous outflow with resolution of stenosis noted  2017 Declot and venous outflow PTA and stent with 8 x 50 VIABAHN  5/15/2017: PTA outflow stenosis (8x60 mustang); Stent outflow stenosis (8x50 Vi[place]ahn)  10/12/17: PTA LUE AV graft (brachial vein) x2: (7x60 [place]); (8x40 [place])   01/10/18: PTA outflow stensois (8x40 [place]) and (9x40 [place])   18: PTA arterial inflow (7 x 40 [place])    No MI/stroke  Tobacco use: Former smoker     Past Medical History   Diagnosis Date    Anemia in ESRD (end-stage renal disease) 3/7/2016    Cervical radiculopathy 2015    Chronic combined systolic and diastolic heart failure 2013     EF 20% , improved with Medical therapy,  negative PET     Chronic respiratory failure with hypoxia 2013     On home oxygen at 2-5 liters    Chronic rhinitis 10/2/2013    DDD (degenerative disc disease), lumbar 2015    ESRD on hemodialysis 2016    Essential hypertension     Gout     Hyperlipidemia 3/7/2016    Lateral meniscal tear 2016    Lumbar stenosis 2015    Morbid obesity 8/15/2013    Paroxysmal atrial fibrillation 2014     Not on anticoagulation    Peripheral neuropathy 2013    Personal history of fall 2014    Personal history of gastric ulcer 3/19/2013    Sarcoidosis, lung 2009     Diagnosed in  with ocular manifestation, on 4L home O2 and PO steroids    Secondary pulmonary hypertension 2015    Seizure disorder 3/19/2013    Shingles 2013    Spondylarthrosis 2015     Past Surgical History   Procedure Laterality Date     section, classic      Abdominal surgery      Vascular surgery       Family History   Problem Relation Age of Onset    Kidney failure Mother     Hypertension Mother     Diabetes Mother     Coronary artery disease Father     Hypertension Father     Diabetes Father     Lupus Sister     Diabetes Maternal Grandmother     Asthma Neg Hx     Emphysema Neg Hx     Melanoma Neg Hx     Psoriasis Neg Hx      Social History     Social History    Marital status: Single     Spouse name: N/A    Number of children: N/A    Years of education: N/A     Occupational History    Not on file.     Social History Main Topics    Smoking status: Former Smoker     Packs/day: 0.50     Years: 10.00     Types: Cigarettes     Quit date: 1994    Smokeless tobacco: Never Used    Alcohol use No    Drug use: No    Sexual activity: No     Other Topics Concern    Not on file     Social History Narrative    Lives with boyfriend/partner who helps with her care.     Plans to travel to [place] for 2 weeks in 2016     Current Outpatient Prescriptions on File  Prior to Visit   Medication Sig    ACZONE 5 % topical gel Apply topically once daily.     allopurinol (ZYLOPRIM) 100 MG tablet Take 1 tablet (100 mg total) by mouth once daily.    amiodarone (PACERONE) 200 MG Tab Take 1 tablet (200 mg total) by mouth every morning.    ammonium lactate (LAC-HYDRIN) 12 % lotion Apply topically as needed (for scars on arms).     aspirin 81 MG Chew Take 81 mg by mouth every morning.    atorvastatin (LIPITOR) 80 MG tablet Take 80 mg by mouth once daily.     capsicum 0.075% (ZOSTRIX) 0.075 % topical cream Apply topically 3 (three) times daily. For neuropathic pain of feet    gabapentin (NEURONTIN) 100 MG capsule TAKE ONE CAPSULE BY MOUTH THREE TIMES DAILY (Patient taking differently: TAKE ONE CAPSULE BY MOUTH THREE TIMES DAILY-noon, evening, bedtime)    levetiracetam (KEPPRA) 500 MG Tab TAKE 1 TABLET BY MOUTH TWICE DAILY.    melatonin 5 mg Tab Take 1 tablet by mouth nightly.    midodrine (PROAMATINE) 10 MG tablet     midodrine (PROAMATINE) 5 MG Tab     NEPHRO-LINDA 0.8 mg Tab TK 1 T PO  QD    ondansetron (ZOFRAN-ODT) 8 MG TbDL Take 1 tablet (8 mg total) by mouth every 8 (eight) hours as needed.    oxycodone-acetaminophen (PERCOCET) 7.5-325 mg per tablet Take 1 tablet by mouth every 4 (four) hours as needed.    predniSONE (DELTASONE) 10 MG tablet TAKE 3 TABLETS BY MOUTH FOR 7 DAYS, THEN 2 TABLETS FOR 7 DAYS, THEN 1 TABLET EVERY DAY AFTER.    RENVELA 800 mg Tab TAKE 1 TABLET BY MOUTH THREE TIMES DAILY WITH MEALS    tizanidine 4 mg Cap Take 4 mg by mouth 2 (two) times daily as needed. (Patient taking differently: Take 4 mg by mouth daily as needed (for muscle spasms.). )    warfarin (COUMADIN) 5 MG tablet TAKE 1 TABLET(5 MG) BY MOUTH DAILY     No current facility-administered medications on file prior to visit.        REVIEW OF SYSTEMS:  General: negative; ENT: negative; Allergy and Immunology: negative; Hematological and Lymphatic: Negative; Endocrine: negative;  Respiratory: no cough, shortness of breath, or wheezing; Cardiovascular: no chest pain or dyspnea on exertion; Gastrointestinal: no abdominal pain/back, change in bowel habits, or bloody stools; Genito-Urinary: no dysuria, trouble voiding, or hematuria; Musculoskeletal: no pain, numbness, to LUE  Neurological: no TIA or stroke symptoms; Psychiatric: no nervousness, anxiety or depression.    PHYSICAL EXAM:   Vitals:    08/15/18 1329   BP: 118/78   Pulse: 69   Temp: 98 °F (36.7 °C)         General appearance:  Alert, well-appearing, and in no distress.  Oriented to person, place, and time   Neurological:  Normal speech, no focal findings noted; CN II - XII grossly intact           Musculoskeletal: Digits/nail without cyanosis/clubbing.  Normal muscle strength/tone.                 Neck: Supple, no significant adenopathy; thyroid is not enlarged                Chest:  Clear to auscultation, symmetric air entry      No use of accessory muscles             Cardiac: Normal rate and regular rhythm, S1 and S2 normal; PMI non-displaced          Abdomen: Soft, non-tender, non-distended, no masses or organomegaly      Extremities:   LUE AVG with palpable thrill, no increased pulsatility, 2+ distal radial pulse      LAB RESULTS:  Lab Results   Component Value Date    WBC 12.56 03/26/2018    HGB 14.7 03/26/2018    HCT 48.0 03/26/2018    MCV 94 03/26/2018     03/26/2018     BMP  Lab Results   Component Value Date     (L) 03/26/2018    K 5.2 (H) 03/26/2018    CL 90 (L) 03/26/2018    CO2 23 03/26/2018    BUN 76 (H) 03/26/2018    CREATININE 8.9 (H) 03/26/2018    CALCIUM 9.3 03/26/2018    ANIONGAP 19 (H) 03/26/2018    ESTGFRAFRICA 5.2 (A) 03/26/2018    EGFRNONAA 4.5 (A) 03/26/2018     Lab Results   Component Value Date    HGBA1C 7.4 (H) 06/13/2018       IMAGING:  Velocities: in cm./sec    Flow decreased since June - 1600 to 1000 with evidence of outflow stenosis      IMP/PLAN:  55 y.o. female with HTN, HLD, CHF (EF 20%),  COPD on home O2, paroxysmal Afib (on Coumadin), seizure disorder, ESRD on HD TThS via LUE AVG. Doing well at HD, however, her overall flow volume is decreased with evidence of outflow stenosis and venous resistance increased at HD sessions.  Will plan for LUE fistulagram next week to evaluation further.  She understands and agrees to proceed.    1) LUE fistulagram next week  2) hold coumadin for 2 days prior      Torrey Villafana MD  Vascular & Endovascular Surgery

## 2018-08-15 NOTE — PROGRESS NOTES
Subjective:    Patient ID:  Sisi Medel is a 57 y.o. female who presents for follow-up of Congestive Heart Failure (5mo clinic visit...Pt c/o of Pn in her feet)      Congestive Heart Failure   Associated symptoms include coughing. Pertinent negatives include no abdominal pain, change in bowel habit, chest pain, chills, diaphoresis, fever or weakness.       This is a 58 yo AAF with sarcoidosis, chronic combined CHF which demonstrated successful remodeling and most recently EF 40-45% range, PH, PAF, ESRD on HD MWF, morbid obesity, YOANDY, HBP, chronic respiratory failure who returns for HF/PH f/u (was on adcirca but stopped due to symptomatic hypotension)    Since last visit, pt has been doing the same- says they gave her a powdered drink for her K when I told her it was high today- she says the powder sinks to the bottom and she doesn't get all of it down (powdered kayexalate)  Has not been swelling much, says her bp has been well controlled. Sometimes it does drop with HD, possibly as her fistula is a little narrowed at the top she says- they are going to go back in next weds   her wt has been up and down. Was 116 when she left HD, up to 119 today, though she hasnt been eating much, though she did have a a good bit of ice.   She is anuric.  Watching her salt. Sleeps on her usual 3 pillows, no PND.    In June saw Dr Harvey, and he increased her pred as she was getting mor SOB- increased for pred from 10->20mg dialy for 10 days and that really helped.  Remains on 10mg daily    TTE today   1 - Moderately depressed left ventricular systolic function (EF 35-40%).     2 - Wall motion abnormalities.     3 - Biatrial enlargement.     4 - Right ventricular enlargement with normal systolic function.     5 - The estimated PA systolic pressure is 37 mmHg.     6 - Mild to moderate mitral regurgitation.        Review of Systems   Constitution: Negative for chills, decreased appetite, diaphoresis, fever, weakness,  "malaise/fatigue, night sweats, weight gain and weight loss.   HENT: Negative.    Eyes: Negative.    Cardiovascular: Positive for dyspnea on exertion. Negative for chest pain, cyanosis, irregular heartbeat, leg swelling, near-syncope, orthopnea, palpitations, paroxysmal nocturnal dyspnea and syncope.   Respiratory: Positive for cough. Negative for hemoptysis, shortness of breath, sleep disturbances due to breathing, snoring, sputum production and wheezing.    Endocrine: Negative.    Hematologic/Lymphatic: Does not bruise/bleed easily.   Skin: Negative.    Musculoskeletal: Positive for falls and joint pain.   Gastrointestinal: Negative for bloating, abdominal pain, change in bowel habit and diarrhea.   Neurological: Negative for light-headedness and loss of balance.   Psychiatric/Behavioral: Negative for depression.     /78   Pulse 70   Ht 5' 8" (1.727 m)   Wt 119.4 kg (263 lb 3.7 oz)   LMP  (LMP Unknown)   SpO2 (!) 91% Comment: Pt on 3L of O2 at reading  BMI 40.02 kg/m²      Physical Exam   Constitutional: She is oriented to person, place, and time. She appears well-developed and well-nourished.   HENT:   Head: Normocephalic and atraumatic.   Eyes: Conjunctivae and EOM are normal. Pupils are equal, round, and reactive to light.   Neck: Neck supple. JVD present. No tracheal deviation present. No thyromegaly present.   Cardiovascular: Normal rate, regular rhythm and normal heart sounds. PMI is displaced. Exam reveals no gallop and no friction rub.   No murmur heard.  Pulmonary/Chest: Effort normal. No respiratory distress. She has no wheezes. She has rales. She exhibits no tenderness.   Few scattered crackles   Abdominal: Soft. Bowel sounds are normal. She exhibits no distension and no mass. There is no tenderness. There is no rebound and no guarding.   Musculoskeletal: Normal range of motion. She exhibits edema. She exhibits no tenderness.   1+ pitting edema to knees   Lymphadenopathy:     She has no " cervical adenopathy.   Neurological: She is alert and oriented to person, place, and time. She has normal reflexes. No cranial nerve deficit. She exhibits normal muscle tone. Coordination normal.   Skin: Skin is warm and dry.   Psychiatric: She has a normal mood and affect. Her behavior is normal. Thought content normal.   Nursing note and vitals reviewed.          Chemistry        Component Value Date/Time     08/15/2018 1444    K 6.5 (HH) 08/15/2018 1444    CL 94 (L) 08/15/2018 1444    CO2 28 08/15/2018 1444    BUN 47 (H) 08/15/2018 1444    CREATININE 7.8 (H) 08/15/2018 1444     (H) 08/15/2018 1444        Component Value Date/Time    CALCIUM 10.3 08/15/2018 1444    ALKPHOS 169 (H) 08/15/2018 1444    AST 26 08/15/2018 1444    ALT 20 08/15/2018 1444    BILITOT 0.5 08/15/2018 1444            Magnesium   Date Value Ref Range Status   08/15/2018 2.7 (H) 1.6 - 2.6 mg/dL Final       Lab Results   Component Value Date    WBC 10.61 08/15/2018    HGB 15.3 08/15/2018    HCT 51.3 (H) 08/15/2018    MCV 97 08/15/2018     08/15/2018       Lab Results   Component Value Date    INR 3.3 (H) 08/15/2018    INR 2.8 (H) 08/03/2018    INR 1.5 07/27/2018       BNP   Date Value Ref Range Status   08/15/2018 674 (H) 0 - 99 pg/mL Final     Comment:     Values of less than 100 pg/ml are consistent with non-CHF populations.   03/26/2018 550 (H) 0 - 99 pg/mL Final     Comment:     Values of less than 100 pg/ml are consistent with non-CHF populations.   01/02/2018 933 (H) 0 - 99 pg/mL Final     Comment:     Values of less than 100 pg/ml are consistent with non-CHF populations.     Assessment:       1. Chronic combined systolic and diastolic CHF now on HD Remains FC III, BNP elevated in her usual range- has been more compliant with fluid/Na restrictions lately and echo today stable to slightly improved   2. Chronic hypoxic resp failure- on home O2, stable   3. Shortness of breath    4. Sarcoidosis- on systemic steroids   5.  Pulmonary hypertension-   6. Paroxysmal atrial fibrillation- with plans for ablation    7. Morbid obesity    8. ESRD on HD MWF   9 Hyperkalemia     Plan:   -will try kayexalate suspension 16 g tonight for her hyperkalemia since the powder was not working for her- she's to pick it on on her way home  Reminded to eat low K diet    No changes today to her HF regimen    -Recommend 2 gram sodium restriction, 32 oz fluid restriction per nephrology  Encourage physical activity with graded exercise program.  Requested patient to weigh themselves daily, and to notify us if their weight increases by more than 3 lbs in 1 day or 5 lbs in 1 week.    F/u 4 mo with labs

## 2018-08-15 NOTE — LETTER
August 15, 2018        Aaron Ware  [address]  [id number]  [facility] [address]  Phone: [phone]  Fax: [phone]             [facility]  [address]  [facility] [address]  Phone: [phone]   Patient: Sisi Medel   MR Number: [id number]   YOB: 1960   Date of Visit: 8/15/2018       Dear Dr. Aaron Ware    Thank you for referring Sisi Medel to me for evaluation. Attached you will find relevant portions of my assessment and plan of care.    If you have questions, please do not hesitate to call me. I look forward to following Sisi Medel along with you.    Sincerely,    Cara Chavarria MD    Enclosure    If you would like to receive this communication electronically, please contact [email or web address] or [phone] to request [employer] access.    [employer] is a tool which provides read-only access to select patient information with whom you have a relationship. Its easy to use and provides real time access to review your patients record including encounter summaries, notes, results, and demographic information.    If you feel you have received this communication in error or would no longer like to receive these types of communications, please e-mail [email or web address]

## 2018-08-15 NOTE — PROGRESS NOTES
[facility] - NEUROLOGY  [facility] [place]    Date: August 15, 2018   Patient Name: Sisi Medel   MRN: [id number]   PCP: Carlos A Caputo  Referring Provider: No ref. provider found    Assessment:      This is Sisi Medel, 57 y.o. female with complex medical problems including AF on coumadin, pulmonary sarcoid, ESRD on HD with orthostasis and recurrent syncope around dialysis who presents for follow up for her epilepsy.  She has symptoms of painful polyneuropathy noted on EMG 2014, no hx of DM but most likely glucose intolerance from long term steroid use verus sarcoid versus renal disease, strength is intact.     Plan:      -  Pamelor 25mg qhs, does not recall this medication  -  Compound cream  -  Continue LEV and GBP to 600mg bid    Follow up 3 months     2014 EPILEPSY QUALITY MEASUREMENT (AAN)  1 a. Seizure Frequency: as noted  1b. Seizure Intervention: as noted  2.  a. Etiology: as noted  b. Seizure Type: as noted  c. Epilepsy Syndrome: n/a  3.  a. Side-effects of AED: as noted   b. Intervention for side-effects: as noted  4. Screening for psychiatric or behavioral disorders: as noted  5. Personalized epilepsy safety issues and education: yes  6. Counseling for women of child-bearing potential and epilepsy: yes   7. Referral of treatment-resistant epilepsy to comprehensive epilepsy center (q 2 years): N/A.    The patient was asked to call me/the clinic 1 week after the test(s) are done to obtain results.    The following issues were  all discussed in detail with the patient and family/caregiver(s):    1. Diagnosis, plans, prognosis, medications and possible side-effects, risks and benefits of treatment, other alternatives to AEDs.  2. Risks related to continued seizures, status epilepticus, SUDEP, driving restrictions and seizure precautions ( no baths but showers are ok, no swimming unsupervised, no use of heavy machinery, no use of sharp moving objects,  avoid heights).   3. Issues related to pregnancy, OCP and breast feeding as it relates to epilepsy.  4. The potential of teratogenicity and suicidal risks of anti-epileptic medications.  5.Avoid any activity that compromise patient safety related to seizures.     Questions and concerns raised by the patient and family/care-giver(s) were addressed and they indicated understanding of everything discussed and agreed to plans as above.       I discussed side effects of the medications. I asked the patient to stop the medication if she notices serious adverse effects as we discussed and to seek immediate medical attention at an ER.     Emeka Lester MD  [facility]   Department of Neurology    Subjective:   No improvement with increased GBP    5/2018  Seizures remain controlled, main concern is neuropathic pain in feet  Developed painful wounds on feet 4/2018 and was told to stop doxepin cream with some healing since - currently reports questionable benefit from doxepin to begin with  Never started pamelor and currently taking GBP 300mg bid    12/2017  Improvement with compound cream, feels she is more mobile but notes it dries skin; did not start pamelor; planning to visit son in [place] for [place]    HPI 9/2017:   Ms. Sisi Medel is a 57 y.o. female who presents for follow up for epilepsy  Her seizures remain controlled and her main concern today is painful paresthesias in distal low extremities which started on right where she had shingles.  No relief from GBP, lidocaine/capsaicin cream.    PAST MEDICAL HISTORY:  Past Medical History:   Diagnosis Date    Anemia in ESRD (end-stage renal disease) 3/7/2016    Anticoagulant long-term use     Cervical radiculopathy 7/20/2015    CHF (congestive heart failure)     Chronic combined systolic and diastolic heart failure 6/14/2013    EF 20% 2013, improved with Medical therapy, negative PET 2013    Chronic respiratory failure with hypoxia 04/22/2013     On home oxygen at 2-5 liters    Closed fracture of proximal end of right fibula 2016    Complications due to renal dialysis device, implant, and graft     DDD (degenerative disc disease), lumbar 2015    Dependence on renal dialysis     Diabetes mellitus type II, controlled 2017    ESRD on hemodialysis 2016    Essential hypertension     Gout     Lateral meniscal tear 2016    Lumbar stenosis 2015    Pacemaker     Paroxysmal atrial fibrillation 2014    Not on anticoagulation    Peripheral neuropathy 2013    Personal history of gastric ulcer 3/19/2013    Sarcoidosis, lung 2009    Diagnosed in  with ocular manifestation, on 4L home O2 and PO steroids    Secondary pulmonary hypertension 2015    Seizure disorder 3/19/2013    Seizures     Shingles 2013    Thyroid disease        PAST SURGICAL HISTORY:  Past Surgical History:   Procedure Laterality Date    ABDOMINAL SURGERY      CARDIAC PACEMAKER PLACEMENT       SECTION      x2    OTHER SURGICAL HISTORY      loop recorder implant    stent to fistula      VASCULAR SURGERY      fistula to L upper arm        CURRENT MEDS:  Current Outpatient Medications   Medication Sig Dispense Refill    ACZONE 5 % topical gel Apply to face every morning 60 g 2    atorvastatin (LIPITOR) 80 MG tablet TAKE 1 TABLET(80 MG) BY MOUTH EVERY NIGHT 90 tablet 3    betamethasone dipropionate (DIPROLENE) 0.05 % ointment AAA bid hand under occlusion 45 g 3    blood sugar diagnostic Strp 1 strip by Misc.(Non-Drug; Combo Route) route 2 (two) times daily with meals. 100 strip 3    blood-glucose meter kit Use as instructed 1 each 0    cholecalciferol, vitamin D3, 1,000 unit capsule Take 1 capsule (1,000 Units total) by mouth once daily. (Patient taking differently: Take 1,000 Units by mouth every evening. )  0    clindamycin (CLEOCIN T) 1 % lotion AAA face bid 60 mL 3    clobetasol 0.05% (TEMOVATE) 0.05 % Oint  AAA hand bid - under occlusion qhs 60 g 3    clopidogrel (PLAVIX) 75 mg tablet Take 1 tablet (75 mg total) by mouth once daily. 30 tablet 11    diclofenac sodium (SOLARAZE) 3 % gel       fludrocortisone (FLORINEF) 0.1 mg Tab Take 100 mcg by mouth 2 (two) times daily.      gabapentin (NEURONTIN) 600 MG tablet Take 1 tablet (600 mg total) by mouth 2 (two) times daily. 60 tablet 5    ketorolac 0.5% (ACULAR) 0.5 % Drop instill one drop into both eyes every morning  3    lancets Misc 1 lancet by Misc.(Non-Drug; Combo Route) route 2 (two) times daily with meals. 100 each 3    lancing device Misc 1 Device by Misc.(Non-Drug; Combo Route) route 2 (two) times daily with meals. 1 each 0    levETIRAcetam (KEPPRA) 500 MG Tab Take 1 tablet (500 mg total) by mouth 2 (two) times daily. 180 tablet 3    linagliptin (TRADJENTA) 5 mg Tab tablet Take 1 tablet (5 mg total) by mouth once daily. 90 tablet 1    midodrine (PROAMATINE) 5 MG Tab       mupirocin (BACTROBAN) 2 % ointment Apply topically once daily. To areas of infection/inflammation on leg 30 g 0    nortriptyline (PAMELOR) 25 MG capsule Take 1 capsule (25 mg total) by mouth every evening. 90 capsule 1    omeprazole (PRILOSEC) 20 MG capsule TAKE 1 CAPSULE BY MOUTH EVERY DAY 90 capsule 4    polyethylene glycol (GLYCOLAX) 17 gram/dose powder Take 17 g by mouth once daily. Dissolve in juice. 510 g 0    prednisoLONE acetate (PRED FORTE) 1 % DrpS INSTILL ONE DROP INTO  LEFT EYE THREE TIMES A DAY  3    predniSONE (DELTASONE) 10 MG tablet TK 1 T PO ONCE D  2    predniSONE (DELTASONE) 10 MG tablet Take 2 tablets (20 mg total) by mouth once daily. Take with food.  After 10 days resume 10 mg daily dose. 20 tablet 0    PREVNAR 13, PF, 0.5 mL Syrg ADM 0.5ML IM UTD  0    DENISE-LINDA 0.8 mg Tab TK 1 T PO QD  0    RENVELA 800 mg Tab TAKE 1 TABLET BY MOUTH THREE TIMES DAILY WITH MEALS 90 tablet 0    SENSIPAR 30 mg Tab daily with breakfast.       sodium polystyrene  (KAYEXALATE) powder TK 30 GM PO QD FOR 30 DAYS  1    tiZANidine (ZANAFLEX) 4 MG tablet 1 tablet by mouth every 8-12 hours as needed for muscle spasm 90 tablet 0    warfarin (COUMADIN) 5 MG tablet Take 1 tablet (5 mg total) by mouth Daily. Per [facility] (Patient taking differently: Take 5 mg by mouth every evening. Per [facility]) 90 tablet 3    warfarin (COUMADIN) 5 MG tablet TAKE 1 TABLET BY MOUTH EVERY DAY EXCEPT TAKE 1 1/2 ON MONDAY AND FRIDAY OR AS DIRECTED BY CLINIC 102 tablet 0    warfarin (COUMADIN) 5 MG tablet TAKE 1 TABLET BY MOUTH EVERY DAY EXCEPT TAKE 1 1/2 ON MONDAY AND FRIDAY OR AS DIRECTED BY CLINIC 40 tablet 0    colchicine 0.6 mg tablet Take 1 tablet (0.6 mg total) by mouth once. 1 tablet 0     No current facility-administered medications for this visit.        ALLERGIES:  Review of patient's allergies indicates:   Allergen Reactions    Bactrim [sulfamethoxazole-trimethoprim] Other (See Comments)     Causes renal failure    Nsaids (non-steroidal anti-inflammatory drug) Other (See Comments)     Renal failure       FAMILY HISTORY:  Family History   Problem Relation Age of Onset    Kidney failure Mother     Hypertension Mother     Diabetes Mother     Coronary artery disease Father     Hypertension Father     Diabetes Father     Lupus Sister     Diabetes Maternal Grandmother     Colon cancer Neg Hx     Ovarian cancer Neg Hx     Breast cancer Neg Hx        SOCIAL HISTORY:  Social History     Tobacco Use    Smoking status: Former Smoker     Packs/day: 0.50     Years: 10.00     Pack years: 5.00     Types: Cigarettes     Last attempt to quit: 1994     Years since quittin.3    Smokeless tobacco: Never Used   Substance Use Topics    Alcohol use: No     Alcohol/week: 0.0 oz     Comment: rarely    Drug use: No       Review of Systems:  12 review of systems is negative except for the symptoms mentioned in HPI.        Objective:     Vitals:    08/15/18 0745   BP: (!)  "123/90   Pulse: 69   Weight: 117.9 kg (259 lb 14.8 oz)   Height: 5' 9" (1.753 m)       General: NAD, well nourished   Eyes: no tearing, discharge, no erythema   ENT: moist mucous membranes of the oral cavity, nares patent    Neck: Supple, full range of motion  Cardiovascular: Warm and well perfused, pulses equal and symmetrical  Lungs: on O2 with increased work of breathing  Psychiatry: Mood and affect are appropriate   Abdomen: soft, non tender, non distended  Extremeties: right foot bandaged, small healing sores in bilateral thumb-index crease.    Neurological   MENTAL STATUS: Alert and oriented to person, place, and time. Attention and concentration within normal limits. Speech without dysarthria, able to name and repeat without difficulty. Recent and remote memory within normal limits   CRANIAL NERVES: Visual fields intact. PERRL. EOMI. Facial sensation intact. Face symmetrical. Hearing grossly intact. Full shoulder shrug bilaterally. Tongue protrudes midline   SENSORY: Sensation is intact to light touch throughout.  Decreased vibration past the ankle  MOTOR: Normal bulk and tone. No pronator drift.  5/5 deltoid, biceps, triceps, interosseous, hand  bilaterally. 5/5 iliopsoas, knee extension/flexion, foot dorsi/plantarflexion bilaterally.    CEREBELLAR/COORDINATION/GAIT: Heel to shin intact. Finger to nose intact. Normal rapid alternating movements.  Able to stand with broad base, presents with walker  "

## 2018-08-15 NOTE — H&P (VIEW-ONLY)
Sisi Medel  8/15/2018    HPI:  Patient is a 55 y.o.  female with a h/o HTN, HLD, CHF (EF 20%), COPD on home O2, paroxysmal Afib (on Coumadin), seizure disorder, ESRD on HD TThS via LUE AVG (placed 2/3/16) who presents to clinic today for follow up. She was last seen in our clinic on 2018, and recently underwent the followin.  Left upper extremity fistulogram.  2.  PTA of mid graft stenosis x2, left upper extremity graft with 8 x 40   [place] balloon.  3.  PTA of left upper extremity AV graft outflow stenosis with a 10 x 40 [place]   balloon.  4.  PTA of left upper extremity AV graft in-stent restenosis of venous outflow   with 8 x 40 [place] balloon.    She has done well since her last visit, however, still continuing to have intermittent elevated venous pressure on the HD circuit. She is able to reliably complete each HD session.  She is feeling well with no complaints today. Ultrasound shows no significant areas of concern.    S/p   2/3/16: LUE AVG creation (Dr Villafana)  16: Percutaneous mechanical thrombectomy w [place] Angiojet AVX; 4fr OTW embolectomy of arterial inflow   PTA outflow stenosis with a 7x40 mm balloon  10/12/16: fistulogram (75% stenosis noted), left upper extremity AV graft PTA venous outflow with resolution of stenosis noted  2017 Declot and venous outflow PTA and stent with 8 x 50 VIABAHN  5/15/2017: PTA outflow stenosis (8x60 mustang); Stent outflow stenosis (8x50 Viabahn)  10/12/17: PTA LUE AV graft (brachial vein) x2: (7x60 [place]); (8x40 [place])   01/10/18: PTA outflow stensois (8x40 [place]) and (9x40 [place])   18: PTA arterial inflow (7 x 40 [place])    No MI/stroke  Tobacco use: Former smoker     Past Medical History   Diagnosis Date    Anemia in ESRD (end-stage renal disease) 3/7/2016    Cervical radiculopathy 2015    Chronic combined systolic and diastolic heart failure 2013     EF 20% , improved with Medical therapy,  negative PET     Chronic respiratory failure with hypoxia 2013     On home oxygen at 2-5 liters    Chronic rhinitis 10/2/2013    DDD (degenerative disc disease), lumbar 2015    ESRD on hemodialysis 2016    Essential hypertension     Gout     Hyperlipidemia 3/7/2016    Lateral meniscal tear 2016    Lumbar stenosis 2015    Morbid obesity 8/15/2013    Paroxysmal atrial fibrillation 2014     Not on anticoagulation    Peripheral neuropathy 2013    Personal history of fall 2014    Personal history of gastric ulcer 3/19/2013    Sarcoidosis, lung 2009     Diagnosed in  with ocular manifestation, on 4L home O2 and PO steroids    Secondary pulmonary hypertension 2015    Seizure disorder 3/19/2013    Shingles 2013    Spondylarthrosis 2015     Past Surgical History   Procedure Laterality Date     section, classic      Abdominal surgery      Vascular surgery       Family History   Problem Relation Age of Onset    Kidney failure Mother     Hypertension Mother     Diabetes Mother     Coronary artery disease Father     Hypertension Father     Diabetes Father     Lupus Sister     Diabetes Maternal Grandmother     Asthma Neg Hx     Emphysema Neg Hx     Melanoma Neg Hx     Psoriasis Neg Hx      Social History     Social History    Marital status: Single     Spouse name: N/A    Number of children: N/A    Years of education: N/A     Occupational History    Not on file.     Social History Main Topics    Smoking status: Former Smoker     Packs/day: 0.50     Years: 10.00     Types: Cigarettes     Quit date: 1994    Smokeless tobacco: Never Used    Alcohol use No    Drug use: No    Sexual activity: No     Other Topics Concern    Not on file     Social History Narrative    Lives with boyfriend/partner who helps with her care.     Plans to travel to [place] for 2 weeks in 2016     Current Outpatient Prescriptions on File  Prior to Visit   Medication Sig    ACZONE 5 % topical gel Apply topically once daily.     allopurinol (ZYLOPRIM) 100 MG tablet Take 1 tablet (100 mg total) by mouth once daily.    amiodarone (PACERONE) 200 MG Tab Take 1 tablet (200 mg total) by mouth every morning.    ammonium lactate (LAC-HYDRIN) 12 % lotion Apply topically as needed (for scars on arms).     aspirin 81 MG Chew Take 81 mg by mouth every morning.    atorvastatin (LIPITOR) 80 MG tablet Take 80 mg by mouth once daily.     capsicum 0.075% (ZOSTRIX) 0.075 % topical cream Apply topically 3 (three) times daily. For neuropathic pain of feet    gabapentin (NEURONTIN) 100 MG capsule TAKE ONE CAPSULE BY MOUTH THREE TIMES DAILY (Patient taking differently: TAKE ONE CAPSULE BY MOUTH THREE TIMES DAILY-noon, evening, bedtime)    levetiracetam (KEPPRA) 500 MG Tab TAKE 1 TABLET BY MOUTH TWICE DAILY.    melatonin 5 mg Tab Take 1 tablet by mouth nightly.    midodrine (PROAMATINE) 10 MG tablet     midodrine (PROAMATINE) 5 MG Tab     NEPHRO-LINDA 0.8 mg Tab TK 1 T PO  QD    ondansetron (ZOFRAN-ODT) 8 MG TbDL Take 1 tablet (8 mg total) by mouth every 8 (eight) hours as needed.    oxycodone-acetaminophen (PERCOCET) 7.5-325 mg per tablet Take 1 tablet by mouth every 4 (four) hours as needed.    predniSONE (DELTASONE) 10 MG tablet TAKE 3 TABLETS BY MOUTH FOR 7 DAYS, THEN 2 TABLETS FOR 7 DAYS, THEN 1 TABLET EVERY DAY AFTER.    RENVELA 800 mg Tab TAKE 1 TABLET BY MOUTH THREE TIMES DAILY WITH MEALS    tizanidine 4 mg Cap Take 4 mg by mouth 2 (two) times daily as needed. (Patient taking differently: Take 4 mg by mouth daily as needed (for muscle spasms.). )    warfarin (COUMADIN) 5 MG tablet TAKE 1 TABLET(5 MG) BY MOUTH DAILY     No current facility-administered medications on file prior to visit.        REVIEW OF SYSTEMS:  General: negative; ENT: negative; Allergy and Immunology: negative; Hematological and Lymphatic: Negative; Endocrine: negative;  Respiratory: no cough, shortness of breath, or wheezing; Cardiovascular: no chest pain or dyspnea on exertion; Gastrointestinal: no abdominal pain/back, change in bowel habits, or bloody stools; Genito-Urinary: no dysuria, trouble voiding, or hematuria; Musculoskeletal: no pain, numbness, to LUE  Neurological: no TIA or stroke symptoms; Psychiatric: no nervousness, anxiety or depression.    PHYSICAL EXAM:   Vitals:    08/15/18 1329   BP: 118/78   Pulse: 69   Temp: 98 °F (36.7 °C)         General appearance:  Alert, well-appearing, and in no distress.  Oriented to person, place, and time   Neurological:  Normal speech, no focal findings noted; CN II - XII grossly intact           Musculoskeletal: Digits/nail without cyanosis/clubbing.  Normal muscle strength/tone.                 Neck: Supple, no significant adenopathy; thyroid is not enlarged                Chest:  Clear to auscultation, symmetric air entry      No use of accessory muscles             Cardiac: Normal rate and regular rhythm, S1 and S2 normal; PMI non-displaced          Abdomen: Soft, non-tender, non-distended, no masses or organomegaly      Extremities:   LUE AVG with palpable thrill, no increased pulsatility, 2+ distal radial pulse      LAB RESULTS:  Lab Results   Component Value Date    WBC 12.56 03/26/2018    HGB 14.7 03/26/2018    HCT 48.0 03/26/2018    MCV 94 03/26/2018     03/26/2018     BMP  Lab Results   Component Value Date     (L) 03/26/2018    K 5.2 (H) 03/26/2018    CL 90 (L) 03/26/2018    CO2 23 03/26/2018    BUN 76 (H) 03/26/2018    CREATININE 8.9 (H) 03/26/2018    CALCIUM 9.3 03/26/2018    ANIONGAP 19 (H) 03/26/2018    ESTGFRAFRICA 5.2 (A) 03/26/2018    EGFRNONAA 4.5 (A) 03/26/2018     Lab Results   Component Value Date    HGBA1C 7.4 (H) 06/13/2018       IMAGING:  Velocities: in cm./sec    Flow decreased since June - 1600 to 1000 with evidence of outflow stenosis      IMP/PLAN:  55 y.o. female with HTN, HLD, CHF (EF 20%),  COPD on home O2, paroxysmal Afib (on Coumadin), seizure disorder, ESRD on HD TThS via LUE AVG. Doing well at HD, however, her overall flow volume is decreased with evidence of outflow stenosis and venous resistance increased at HD sessions.  Will plan for LUE fistulagram next week to evaluation further.  She understands and agrees to proceed.    1) LUE fistulagram next week  2) hold coumadin for 2 days prior      Torrey Villafana MD  Vascular & Endovascular Surgery

## 2018-08-16 NOTE — PROGRESS NOTES
The pt is scheduled for a fistulagram procedure next week and will need to hold coumadin x 2 days prior.  She is not a candidate for lovenox, as she requires dialysis treatments.  She has held coumadin without a bridge and without complication in the past.  See calendar for holding instructions.

## 2018-08-16 NOTE — PROGRESS NOTES
Flow decreased since June - 1600 to 1000 with evidence of outflow stenosis   IMP/PLAN:  55 y.o. female with HTN, HLD, CHF (EF 20%), COPD on home O2, paroxysmal Afib (on Coumadin), seizure disorder, ESRD on HD TThS via LUE AVG. Doing well at HD, however, her overall flow volume is decreased with evidence of outflow stenosis and venous resistance increased at HD sessions.  Will plan for LUE fistulagram next week to evaluation further.  She understands and agrees to proceed.     1) LUE fistulagram next week  2) hold coumadin for 2 days prior     Torrey Villafana MD  Vascular & Endovascular Surgery    Will holding coumadin Monday and Tuesday prior to procedure.  INR elevated today with correct dose taken, will lower 8/17 and maintain through Monday.

## 2018-08-21 ENCOUNTER — TELEPHONE (OUTPATIENT)
Dept: NEUROLOGY | Facility: CLINIC | Age: 58
End: 2018-08-21

## 2018-08-22 ENCOUNTER — HOSPITAL ENCOUNTER (OUTPATIENT)
Facility: HOSPITAL | Age: 58
Discharge: HOME | End: 2018-08-22
Attending: SURGERY | Admitting: SURGERY
Payer: MEDICARE

## 2018-08-22 VITALS
DIASTOLIC BLOOD PRESSURE: 73 MMHG | HEART RATE: 70 BPM | TEMPERATURE: 96 F | HEIGHT: 68 IN | WEIGHT: 253.5 LBS | RESPIRATION RATE: 20 BRPM | BODY MASS INDEX: 38.42 KG/M2 | SYSTOLIC BLOOD PRESSURE: 125 MMHG | OXYGEN SATURATION: 97 %

## 2018-08-22 DIAGNOSIS — T82.590A AV GRAFT MALFUNCTION: ICD-10-CM

## 2018-08-22 DIAGNOSIS — T82.858D STENOSIS OF OTHER VASCULAR PROSTHETIC DEVICES, IMPLANTS AND GRAFTS, SUBSEQUENT ENCOUNTER: ICD-10-CM

## 2018-08-22 LAB
PERIPHERAL PTA: YES
POCT GLUCOSE: 123 MG/DL (ref 70–110)

## 2018-08-22 PROCEDURE — C1894 INTRO/SHEATH, NON-LASER: HCPCS

## 2018-08-22 PROCEDURE — 36902 INTRO CATH DIALYSIS CIRCUIT: CPT | Mod: ,,, | Performed by: SURGERY

## 2018-08-22 PROCEDURE — 99152 MOD SED SAME PHYS/QHP 5/>YRS: CPT | Mod: ,,, | Performed by: SURGERY

## 2018-08-22 PROCEDURE — 82962 GLUCOSE BLOOD TEST: CPT

## 2018-08-22 RX ORDER — LIDOCAINE HYDROCHLORIDE 10 MG/ML
1 INJECTION, SOLUTION EPIDURAL; INFILTRATION; INTRACAUDAL; PERINEURAL ONCE
Status: DISCONTINUED | OUTPATIENT
Start: 2018-08-22 | End: 2018-08-22 | Stop reason: HOSPADM

## 2018-08-22 RX ORDER — CEFAZOLIN SODIUM 1 G/3ML
2 INJECTION, POWDER, FOR SOLUTION INTRAMUSCULAR; INTRAVENOUS
Status: DISCONTINUED | OUTPATIENT
Start: 2018-08-22 | End: 2018-08-22 | Stop reason: HOSPADM

## 2018-08-22 RX ORDER — MUPIROCIN 20 MG/G
OINTMENT TOPICAL
Status: DISCONTINUED | OUTPATIENT
Start: 2018-08-22 | End: 2018-08-22 | Stop reason: HOSPADM

## 2018-08-22 NOTE — PROGRESS NOTES
Admitted to [facility] for procedure, AAOx4, VSS. On Oxygen 3L NC from home, sats=97 %, no c/o SOB. Ambulates well with walker. On hemodialysis, Pt stated she is unable to urinate. AV fistula site to LUE, positive thrill and bruit noted. Pre procedural process reviewed.  Fiance at bedside with pt.

## 2018-08-22 NOTE — DISCHARGE SUMMARY
[facility]  Discharge Note  Short Stay    Admit Date: 8/22/2018    Discharge Date and Time: 8/22/2018  1:51 PM     Attending Physician: Torrey Villafana MD    Discharge Provider: Yonatan Guerra    Diagnoses:  Active Hospital Problems    Diagnosis  POA    *AV graft malfunction [T82.590A]  Yes      Resolved Hospital Problems   No resolved problems to display.       Discharged Condition: good    Hospital Course: Patient was admitted for an outpatient procedure and tolerated the procedure well with no complications.    Final Diagnoses: Same as principal problem.    Disposition: Home or Self Care    Follow up/Patient Instructions:      Medications:  Reconciled Home Medications:      Medication List      CHANGE how you take these medications    cholecalciferol (vitamin D3) 1,000 unit capsule  Take 1 capsule (1,000 Units total) by mouth once daily.  What changed:  when to take this     * warfarin 5 MG tablet  Commonly known as:  COUMADIN  Take 1 tablet (5 mg total) by mouth Daily. Per [facility]  What changed:    · when to take this  · additional instructions     * warfarin 5 MG tablet  Commonly known as:  COUMADIN  TAKE 1 TABLET BY MOUTH EVERY DAY EXCEPT TAKE 1 1/2 ON MONDAY AND FRIDAY OR AS DIRECTED BY CLINIC  What changed:  Another medication with the same name was changed. Make sure you understand how and when to take each.     * warfarin 5 MG tablet  Commonly known as:  COUMADIN  TAKE 1 TABLET BY MOUTH EVERY DAY EXCEPT TAKE 1 1/2 ON MONDAY AND FRIDAY OR AS DIRECTED BY CLINIC  What changed:  Another medication with the same name was changed. Make sure you understand how and when to take each.         * This list has 3 medication(s) that are the same as other medications prescribed for you. Read the directions carefully, and ask your doctor or other care provider to review them with you.            CONTINUE taking these medications    ACZONE 5 % topical gel  Generic drug:  dapsone  Apply to face every  morning     atorvastatin 80 MG tablet  Commonly known as:  LIPITOR  TAKE 1 TABLET(80 MG) BY MOUTH EVERY NIGHT     betamethasone dipropionate 0.05 % ointment  Commonly known as:  DIPROLENE  AAA bid hand under occlusion     blood sugar diagnostic Strp  1 strip by Misc.(Non-Drug; Combo Route) route 2 (two) times daily with meals.     blood-glucose meter kit  Use as instructed     clopidogrel 75 mg tablet  Commonly known as:  PLAVIX  Take 1 tablet (75 mg total) by mouth once daily.     colchicine 0.6 mg tablet  Take 1 tablet (0.6 mg total) by mouth once.     diclofenac sodium 3 % gel  Commonly known as:  SOLARAZE     fludrocortisone 0.1 mg Tab  Commonly known as:  FLORINEF  Take 100 mcg by mouth 2 (two) times daily.     gabapentin 600 MG tablet  Commonly known as:  NEURONTIN  Take 1 tablet (600 mg total) by mouth 2 (two) times daily.     ketorolac 0.5% 0.5 % Drop  Commonly known as:  ACULAR  instill one drop into both eyes every morning     lancets Misc  1 lancet by Misc.(Non-Drug; Combo Route) route 2 (two) times daily with meals.     lancing device Misc  1 Device by Misc.(Non-Drug; Combo Route) route 2 (two) times daily with meals.     levETIRAcetam 500 MG Tab  Commonly known as:  KEPPRA  Take 1 tablet (500 mg total) by mouth 2 (two) times daily.     linagliptin 5 mg Tab tablet  Commonly known as:  TRADJENTA  Take 1 tablet (5 mg total) by mouth once daily.     midodrine 5 MG Tab  Commonly known as:  PROAMATINE     mupirocin 2 % ointment  Commonly known as:  BACTROBAN  Apply topically once daily. To areas of infection/inflammation on leg     nortriptyline 25 MG capsule  Commonly known as:  PAMELOR  Take 1 capsule (25 mg total) by mouth every evening.     omeprazole 20 MG capsule  Commonly known as:  PRILOSEC  TAKE 1 CAPSULE BY MOUTH EVERY DAY     polyethylene glycol 17 gram/dose powder  Commonly known as:  GLYCOLAX  Take 17 g by mouth once daily. Dissolve in juice.     prednisoLONE acetate 1 % Drps  Commonly known as:   PRED FORTE  INSTILL ONE DROP INTO  LEFT EYE THREE TIMES A DAY     * predniSONE 10 MG tablet  Commonly known as:  DELTASONE  TK 1 T PO ONCE D     * predniSONE 10 MG tablet  Commonly known as:  DELTASONE  Take 2 tablets (20 mg total) by mouth once daily. Take with food.  After 10 days resume 10 mg daily dose.     PREVNAR 13 (PF) 0.5 mL Syrg  Generic drug:  pneumoc 13-chandana conj-dip cr(PF)  ADM 0.5ML IM UTD     DENISE-LINAD 0.8 mg Tab  Generic drug:  B complex-vitamin C-folic acid  TK 1 T PO QD     RENVELA 800 mg Tab  Generic drug:  sevelamer carbonate  TAKE 1 TABLET BY MOUTH THREE TIMES DAILY WITH MEALS     sodium polystyrene 15 gram/60 mL Susp  Commonly known as:  KAYEXALATE  Take 60 mLs (15 g total) by mouth daily as needed. Take when directed by your doctor     tiZANidine 4 MG tablet  Commonly known as:  ZANAFLEX  1 tablet by mouth every 8-12 hours as needed for muscle spasm         * This list has 2 medication(s) that are the same as other medications prescribed for you. Read the directions carefully, and ask your doctor or other care provider to review them with you.              Discharge Procedure Orders   VAS  Hemodialysis Access   Standing Status: Future Standing Exp. Date: 08/22/19     Diet general     Call MD for:  redness, tenderness, or signs of infection (pain, swelling, redness, odor or green/yellow discharge around incision site)     Leave dressing on - Keep it clean, dry, and intact until clinic visit     Shower on day dressing removed (No bath)     Follow-up Information     Torrey Villafana MD In 1 week.    Specialty:  Vascular Surgery  Why:  with ultrasound  Contact information:  [address]  [facility] [id number]  [phone]                   Discharge Procedure Orders (must include Diet, Follow-up, Activity):   Discharge Procedure Orders (must include Diet, Follow-up, Activity)   VAS  Hemodialysis Access   Standing Status: Future Standing Exp. Date: 08/22/19     Neena general     Irish SHAFER  for:  redness, tenderness, or signs of infection (pain, swelling, redness, odor or green/yellow discharge around incision site)     Leave dressing on - Keep it clean, dry, and intact until clinic visit     Shower on day dressing removed (No bath)

## 2018-08-22 NOTE — INTERVAL H&P NOTE
The patient has been examined and the H&P has been reviewed:    I concur with the findings and no changes have occurred since H&P was written.    Anesthesia/Surgery risks, benefits and alternative options discussed and understood by patient/family.          Active Hospital Problems    Diagnosis  POA    AV graft malfunction [T82.442A]  Yes      Resolved Hospital Problems   No resolved problems to display.

## 2018-08-22 NOTE — PROGRESS NOTES
Admitted post procedure, VSS, AAOx4. No c/o any pain or issues. Left upper arm AV graft with jovani and tegaderm dressing C/D/I, no redness, swelling or hematoma noted. Positive thrill and bruit noted. Offer PO meal. Post procedure protocol reviewed with pt. Awaiting d/c orders from MD.

## 2018-08-22 NOTE — PROGRESS NOTES
D/c orders received from MD. Pt AAOx4. No c/o any issues. LUE with dressing C/D/I, positive thrill and bruit noted. PIV to right hand d/c catheter intact. D/c instructions provided verbally and with handout. Pt  Verbalized understanding. D/c to home at this time per order.Pt refused wheelchair to vehicle and walked out with use of her walker.

## 2018-08-23 NOTE — OP NOTE
DATE OF PROCEDURE:  08/22/2018    SURGEON:  Torrey Villafana M.D.    ASSISTANT:  None.    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    PROCEDURES PERFORMED:  1.  PTA of outflow stenosis with 8 x 40 [place] balloon x2.  2.  PTA of venous outflow stenosis of left upper extremity AV graft with 10 x 40   [place] balloon x2.  3.  Left upper extremity fistulogram.  4.  Conscious sedation 45 minutes.    ANESTHESIA:  MAC plus local.    SPECIMENS:  None.    COMPLICATIONS:  None.    ESTIMATED BLOOD LOSS:  Minimal.    DESCRIPTION OF PROCEDURE:  The patient is a 57-year-old female well known to me   with left upper extremity graft placed approximately 3 years ago.  It has   required multiple re-interventions, but is functioning well.  Decreased flow and   increased velocity at the venous outflow was noted on a recent duplex.  For   this reason, an intervention was planned.    The patient was identified as Sisi Medel and brought to the   Catheterization Laboratory.  She was positioned supine on the laboratory table.    Anesthesia was initiated and after a team wide timeout during which the   procedure and laterality were agreed upon by all Operating Room staff, the left   upper extremity was prepped in standard sterile fashion.  A subcutaneous wheal   of 1% lidocaine was raised over the inflow aspect of the AV graft and the graft   was accessed with a micropuncture needle.  Under fluoroscopic guidance, a   micropuncture wire was advanced through the graft.  Micropuncture sheath was   then placed and a left upper extremity and central venous fistulogram was   performed revealing good flow through the graft, but multifocal stenosis in the   venous outflow vein.  An intervention was then planned.    A stiff angled Glidewire was advanced under fluoroscopic guidance and with the   aid of an angled  glide catheter was maneuvered past the stenosis.  An angiogram   with road mapping imaging delineated the multifocal stenosis.  An 8 x 40 [place]   balloon was brought on to the field and used to dilate several areas of   narrowing of the venous outflow vein.  There was a good result, but not a great   result and therefore a 10 x 40 [place] balloon was brought on to the field and   used to dilate the stenotic areas with a great result on followup fistulogram.    Satisfied with the result and the results of a further central venous venogram,   the procedure was terminated.  Wires and catheters were removed and a 3-0   Monocryl suture was placed to close the sheath defect.  I neglected to mention   that a 6-[place] sheath was placed over the stiff angled Glidewire prior to   intervention.    I, Torrey Villafana, was present for the entirety of the operation and   supervised approximately 45 minutes of conscious sedation time.  For detailed   explanations of medication and dosages, please see the nursing chart.      CB/IN  dd: 08/23/2018 12:03:58 (CDT)  td: 08/23/2018 12:26:09 (CDT)  Doc ID   #[id number]  Job ID #[id number]    CC:

## 2018-08-27 NOTE — PROGRESS NOTES
Subjective:      Patient ID: Sisi Medel is a 57 y.o. female.    Chief Complaint: Pain of the Right Hand      HPI  Sisi Medel is a right hand dominant 57 y.o. female presenting today with swelling of the right long finger MCP joint.  She says that it has been present for over a month, it is intermittently painful.  She denies any redness or increased warmth of the skin there.  She did not have her EMG as scheduled, because she was in too much pain and too tired after dialysis.    At a prior visit labs showed mildly elevated uric acid level.  She was placed on a renal appropriate dose of Colchicine, per discussion with her PCP.  The pain in her hands did improve.  She also had blistering at the thumbs, right worse than left.  She has continued to use Medihoney as needed.     Prior exams did show decreased sensation in the hand on the right compared to left. She reports that the swelling and pain make it hard to use the right hand - she has difficulty opening bottles. She has trouble holding a glass, she is concerned about hand weakness and dropping items. She has pain using her walker.       She does admit to history of gout and diabetes.      Review of patient's allergies indicates:   Allergen Reactions    Bactrim [sulfamethoxazole-trimethoprim] Other (See Comments)     Causes renal failure    Nsaids (non-steroidal anti-inflammatory drug) Other (See Comments)     Renal failure         Current Outpatient Medications   Medication Sig Dispense Refill    ACZONE 5 % topical gel Apply to face every morning 60 g 2    atorvastatin (LIPITOR) 80 MG tablet TAKE 1 TABLET(80 MG) BY MOUTH EVERY NIGHT 90 tablet 3    betamethasone dipropionate (DIPROLENE) 0.05 % ointment AAA bid hand under occlusion 45 g 3    blood sugar diagnostic Strp 1 strip by Misc.(Non-Drug; Combo Route) route 2 (two) times daily with meals. 100 strip 3    blood-glucose meter kit Use as instructed 1 each 0    cholecalciferol,  vitamin D3, 1,000 unit capsule Take 1 capsule (1,000 Units total) by mouth once daily. (Patient taking differently: Take 1,000 Units by mouth every evening. )  0    clopidogrel (PLAVIX) 75 mg tablet Take 1 tablet (75 mg total) by mouth once daily. 30 tablet 11    diclofenac sodium (SOLARAZE) 3 % gel       fludrocortisone (FLORINEF) 0.1 mg Tab Take 100 mcg by mouth 2 (two) times daily.      gabapentin (NEURONTIN) 600 MG tablet Take 1 tablet (600 mg total) by mouth 2 (two) times daily. 60 tablet 5    ketorolac 0.5% (ACULAR) 0.5 % Drop instill one drop into both eyes every morning  3    lancets Misc 1 lancet by Misc.(Non-Drug; Combo Route) route 2 (two) times daily with meals. 100 each 3    lancing device Misc 1 Device by Misc.(Non-Drug; Combo Route) route 2 (two) times daily with meals. 1 each 0    levETIRAcetam (KEPPRA) 500 MG Tab Take 1 tablet (500 mg total) by mouth 2 (two) times daily. 180 tablet 3    linagliptin (TRADJENTA) 5 mg Tab tablet Take 1 tablet (5 mg total) by mouth once daily. 90 tablet 1    midodrine (PROAMATINE) 5 MG Tab       mupirocin (BACTROBAN) 2 % ointment Apply topically once daily. To areas of infection/inflammation on leg 30 g 0    nortriptyline (PAMELOR) 25 MG capsule Take 1 capsule (25 mg total) by mouth every evening. 90 capsule 1    omeprazole (PRILOSEC) 20 MG capsule TAKE 1 CAPSULE BY MOUTH EVERY DAY 90 capsule 4    polyethylene glycol (GLYCOLAX) 17 gram/dose powder Take 17 g by mouth once daily. Dissolve in juice. 510 g 0    prednisoLONE acetate (PRED FORTE) 1 % DrpS INSTILL ONE DROP INTO  LEFT EYE THREE TIMES A DAY  3    predniSONE (DELTASONE) 10 MG tablet TK 1 T PO ONCE D  2    predniSONE (DELTASONE) 10 MG tablet Take 2 tablets (20 mg total) by mouth once daily. Take with food.  After 10 days resume 10 mg daily dose. 20 tablet 0    PREVNAR 13, PF, 0.5 mL Syrg ADM 0.5ML IM UTD  0    DENISE-LINDA 0.8 mg Tab TK 1 T PO QD  0    RENVELA 800 mg Tab TAKE 1 TABLET BY MOUTH  THREE TIMES DAILY WITH MEALS 90 tablet 0    sodium polystyrene (KAYEXALATE) 15 gram/60 mL Susp Take 60 mLs (15 g total) by mouth daily as needed. Take when directed by your doctor 60 mL 11    tiZANidine (ZANAFLEX) 4 MG tablet 1 tablet by mouth every 8-12 hours as needed for muscle spasm 90 tablet 0    warfarin (COUMADIN) 5 MG tablet Take 1 tablet (5 mg total) by mouth Daily. Per [facility] (Patient taking differently: Take 5 mg by mouth every evening. Per [facility]) 90 tablet 3    warfarin (COUMADIN) 5 MG tablet TAKE 1 TABLET BY MOUTH EVERY DAY EXCEPT TAKE 1 1/2 ON MONDAY AND FRIDAY OR AS DIRECTED BY CLINIC 102 tablet 0    warfarin (COUMADIN) 5 MG tablet TAKE 1 TABLET BY MOUTH EVERY DAY EXCEPT TAKE 1 1/2 ON MONDAY AND FRIDAY OR AS DIRECTED BY CLINIC 40 tablet 0    colchicine 0.6 mg tablet Take 1 tablet (0.6 mg total) by mouth once. 1 tablet 0     No current facility-administered medications for this visit.        Past Medical History:   Diagnosis Date    Anemia in ESRD (end-stage renal disease) 3/7/2016    Anticoagulant long-term use     Cervical radiculopathy 7/20/2015    CHF (congestive heart failure)     Chronic combined systolic and diastolic heart failure 6/14/2013    EF 20% 2013, improved with Medical therapy, negative PET 2013    Chronic respiratory failure with hypoxia 04/22/2013    On home oxygen at 2-5 liters    Closed fracture of proximal end of right fibula 6/27/2016    Complications due to renal dialysis device, implant, and graft     DDD (degenerative disc disease), lumbar 6/17/2015    Dependence on renal dialysis     Diabetes mellitus type II, controlled 12/8/2017    ESRD on hemodialysis 2/23/2016    Essential hypertension     Gout     Lateral meniscal tear 5/31/2016    Lumbar stenosis 6/17/2015    Pacemaker     Paroxysmal atrial fibrillation 5/16/2014    Not on anticoagulation    Peripheral neuropathy 11/13/2013    Personal history of gastric ulcer 3/19/2013     "Sarcoidosis, lung 2009    Diagnosed in  with ocular manifestation, on 4L home O2 and PO steroids    Secondary pulmonary hypertension 2015    Seizure disorder 3/19/2013    Shingles 2013    Thyroid disease        Past Surgical History:   Procedure Laterality Date    ABDOMINAL SURGERY      CARDIAC PACEMAKER PLACEMENT       SECTION      x2    OTHER SURGICAL HISTORY      loop recorder implant    stent to fistula      VASCULAR SURGERY      fistula to L upper arm          Review of Systems:  Review of Systems   Constitution: Negative for chills and fever.   Skin: Positive for color change (see HPI). Negative for rash and suspicious lesions.   Musculoskeletal:        See HPI   Neurological: Positive for dizziness (orthostatic ). Negative for headaches, light-headedness, numbness and paresthesias.   Psychiatric/Behavioral: Negative for depression. The patient is not nervous/anxious.          OBJECTIVE:     PHYSICAL EXAM:  Height: 5' 8" (172.7 cm) Weight: 114.8 kg (253 lb)  Vitals:    18 0812   BP: 106/74   Pulse: 71   Weight: 114.8 kg (253 lb)   Height: 5' 8" (1.727 m)   PainSc: 0-No pain     General    Vitals reviewed.  Constitutional: She is oriented to person, place, and time. She appears well-developed and well-nourished.   Patient is on supplemental oxygen;  She uses a walker   HENT:   Head: Normocephalic and atraumatic.   Neck: Normal range of motion.   Cardiovascular: Normal rate.    Pulmonary/Chest: Effort normal. No respiratory distress.   Neurological: She is alert and oriented to person, place, and time.   Psychiatric: She has a normal mood and affect. Her behavior is normal. Judgment and thought content normal.           Musculoskeletal: Mild edema of the right hand and right wrist.  Right long finger MCP appears the edematous compared to other MCP joints of the right hand.  There is a soft, mildly tender, nodule at the ulnar aspect of the MCP joint.  First web space on the " right with small wound, much improved since prior visit.  No erythema or increased warmth of the skin noted, no discharge.  Improved ROM of the fingers, no pain today with finger and wrist ROM. No sagittal band disruption noted.  Neurovascularly intact-decreased sensation in the right radial, ulnar, and median distributions compared to the left.  Good motor function.  Good capillary refill and 2+ radial pulses.       RADIOGRAPHS:  Right Hand X-Ray, 5/14/18  FINDINGS:  Bones are intact.  There is no evidence for acute fracture or bone destruction. There are mild degenerative changes within the small joints of the hand, most pronounced at the interphalangeal joint of the right thumb.  No bony erosions are identified.  There is no evidence for dislocation.  Soft tissues appear unremarkable.   Impression   Degenerative changes, most pronounced at the right 1st interphalangeal joint.  No evidence for acute fracture, bone destruction, or dislocation.       ASSESSMENT/PLAN:   Sisi was seen today for pain.    Diagnoses and all orders for this visit:    Mass of joint of hand, right  -     US Extremity Non Vascular Limited Right; Future    Pain of right hand   -     US Extremity Non Vascular Limited Right; Future        - US to eval the soft tissue mass at the MCP joint  - EMG to be rescheduled, patient to cancel if her symptoms resolve  - follow-up after EMG to discuss results and plan  - call with questions or concerns    Disclaimer: This note has been generated using voice-recognition software. There may be typographical errors that have been missed during proof-reading.

## 2018-08-31 PROBLEM — I42.9 CARDIOMYOPATHY: Status: ACTIVE | Noted: 2018-01-01

## 2018-08-31 NOTE — PROGRESS NOTES
Subjective:       Patient ID:  Sisi Medel is a 57 y.o. female who presents for   Chief Complaint   Patient presents with    Spot     L leg, x months, painful, spreading, itchy, tx mupirocin     Spot  - Initial  Affected locations: left lower leg  Signs / symptoms: itching, pain and spreading  Treatments tried: mupirocin oint.  Improvement on treatment: no relief    has noted this rash on left leg x past few months. + itching. ESRD on dialysis.  Also asks about good moisturizers. Uses dial or zest soap, and lubriderm lotion.    Review of Systems   Skin: Positive for itching and dry skin.   Hematologic/Lymphatic: Bruises/bleeds easily.        Objective:    Physical Exam   Constitutional: She appears well-developed and well-nourished. No distress.   Neurological: She is alert and oriented to person, place, and time. She is not disoriented.   Psychiatric: She has a normal mood and affect.   Skin:   Areas Examined (abnormalities noted in diagram):   Scalp / Hair Palpated and Inspected  Head / Face Inspection Performed  Neck Inspection Performed  RLE Inspected  LLE Inspection Performed              Diagram Legend     Erythematous scaling macule/papule c/w actinic keratosis       Vascular papule c/w angioma      Pigmented verrucoid papule/plaque c/w seborrheic keratosis      Yellow umbilicated papule c/w sebaceous hyperplasia      Irregularly shaped tan macule c/w lentigo     1-2 mm smooth white papules consistent with Milia      Movable subcutaneous cyst with punctum c/w epidermal inclusion cyst      Subcutaneous movable cyst c/w pilar cyst      Firm pink to brown papule c/w dermatofibroma      Pedunculated fleshy papule(s) c/w skin tag(s)      Evenly pigmented macule c/w junctional nevus     Mildly variegated pigmented, slightly irregular-bordered macule c/w mildly atypical nevus      Flesh colored to evenly pigmented papule c/w intradermal nevus       Pink pearly papule/plaque c/w basal cell carcinoma       Erythematous hyperkeratotic cursted plaque c/w SCC      Surgical scar with no sign of skin cancer recurrence      Open and closed comedones      Inflammatory papules and pustules      Verrucoid papule consistent consistent with wart     Erythematous eczematous patches and plaques     Dystrophic onycholytic nail with subungual debris c/w onychomycosis     Umbilicated papule    Erythematous-base heme-crusted tan verrucoid plaque consistent with inflamed seborrheic keratosis     Erythematous Silvery Scaling Plaque c/w Psoriasis     See annotation      Assessment / Plan:        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 To rash on left leg for 14 days  Dispense: 45 g; Refill: 2    Good skin care regimen discussed including limiting to one bath or shower/day, using lukewarm water with mild soap and moisturizing cream to skin 1 - 2x/day. Brochure was provided and reviewed with patient.             Follow-up if symptoms worsen or fail to improve.

## 2018-08-31 NOTE — PROGRESS NOTES
Quick follow up from low INR on 8/27. Patient INR in therapeutic range today. Reports no bleeding, bruising or other changes. Will maintain current weekly dose until follow up in 2 weeks. Advised patient to call with any concerns or changes. Care Plan made with Rand Broderick D.

## 2018-08-31 NOTE — PROGRESS NOTES
PHYSICAL THERAPY DISCHARGE SUMMARY     Name: Sisi Medel  [facility] Number: [id number]    Diagnosis:        Encounter Diagnoses   Name Primary?    Muscle weakness      Gait instability        Physician: Carlos A Caputo MD  Treatment Orders: PT Eval and Treat    Past Medical History:   Diagnosis Date    Anemia in ESRD (end-stage renal disease) 3/7/2016    Anticoagulant long-term use     Cervical radiculopathy 7/20/2015    CHF (congestive heart failure)     Chronic combined systolic and diastolic heart failure 6/14/2013    EF 20% 2013, improved with Medical therapy, negative PET 2013    Chronic respiratory failure with hypoxia 04/22/2013    On home oxygen at 2-5 liters    Closed fracture of proximal end of right fibula 6/27/2016    Complications due to renal dialysis device, implant, and graft     DDD (degenerative disc disease), lumbar 6/17/2015    Dependence on renal dialysis     Diabetes mellitus type II, controlled 12/8/2017    ESRD on hemodialysis 2/23/2016    Essential hypertension     Gout     Lateral meniscal tear 5/31/2016    Lumbar stenosis 6/17/2015    Pacemaker     Paroxysmal atrial fibrillation 5/16/2014    Not on anticoagulation    Peripheral neuropathy 11/13/2013    Personal history of gastric ulcer 3/19/2013    Sarcoidosis, lung 2009    Diagnosed in 2009 with ocular manifestation, on 4L home O2 and PO steroids    Secondary pulmonary hypertension 4/7/2015    Seizure disorder 3/19/2013    Shingles 11/13/2013    Thyroid disease        Initial visit: 05/14/2018  Date of Last visit: 05/30/2018  Date of Discharge Note:  08/31/2018  Total Visits Received: 3  Missed Visits: 0  ASSESSMENT   Status Towards Goals Met:  Following last treatment, pt considered inappropriate for therapy services in the outpatient setting due to intolerance for treatment. Goals were not assessed at this time.        Goals Not achieved and why: Pt has not scheduled any additional visits and has  not returned to therapy.     Discharge reason : Decreased medical status, Pt has not re-scheduled further follow-up sessions and POC has     G-CODE: Discharge g-code not filed due to discharge note being documentation only. Upon eval Pt was at CK (40<60%)  with g-code goal set at Ck (40 < 60%).    Short Term GOALS:  In 4 weeks, pt. will:  Pt will perform sit to stand transfer from standard height chair with no use of UEs and < or = min A in order to be independent with transfers.  Pt will increase tinetti socre to 18/28 to improve balance.  Pt will navigate 4 steps with < or = min A with 2 HR in order to improve functional mobility.  Pt will improve B hip flexion strength by 1 grade in order to improve stability with functional tasks.  Pt to tolerate HEP to improve ROM and independence with ADL's     Long term goals  In 8 weeks, pt will:  Pt will be able to perform 5 consecutive sit to stand transfers s/ UE use on first attempts from std ht chair in order to improve transfer skills  Pt will improve Tinetti score to > or = 22/28 in order to improve functional mobility  Pt will navigate 7 steps with < or = min A with 2 HR in order to tolerance to ambulation.  Pt will improve B hip flexion and abduction strength to 4+/5 MMT grade in order to improve stability with upright tasks.  Pt demonstrate independence in HEP and POC to improve ROM and independence with ADL's.       PLAN   This patient is discharged from Physical Therapy Services.       Jhoana Villarreal, PT

## 2018-08-31 NOTE — Clinical Note
I spoke with Ms. Medel about her 5 hour HD and said it was optimal for her. She seems very agreeable to continuing this.Thanks,AYAN Trujillo, NP-[facility] Electrophysiology

## 2018-08-31 NOTE — PROGRESS NOTES
Ms. Medel is a patient of Dr. Adorno and was last seen in clinic 8/4/2017.      Subjective:   Patient ID:  Sisi Medel is a 57 y.o. female who presents for follow-up of Dizziness and Shortness of Breath  .     HPI:    Ms. Medel is a 57 y.o. female with permanent AF (AVN RFA 6/2017), NICM (EF was 35%, then normalized, now EF 35-40%), CRT-P, pulmonary sarcoidosis/CRLD w/pHTN (on home O2 via NC), GERD, ESRD on HD w/anemia, a seizure, morbid obesity here for annual follow up.     Background:    NICM (EF has been as low as 30%, high as 60%, now 35-40%); negative PET stress in 2013.  Syncopal episode following HD on 6/27/2016. Found to be in AF.  Successful JARAD/DCCV 6/27/2016. ILR placed.  ILR found pAF.   CRT-P placed 1/11/2017, v paced only 4% while in AF.  RFA of AVN on 6/12/2017.  Followed by Dr. Chavarria for pHTN and CHF.  Has been doing well with 5 hour HD for volume control.    Update (08/31/2018):    Today she says that she has been feeling tired over the course of the year. She says that she has not been able to function like she wants to. She has chronic HYATT and periodic light-headedness. She is treated with midodrine and blood pressures have been stable. She is on continuous oxygen via NC and followed by pulmonology. She denies exertional chest pain or syncope.    She is currently taking coumadin for stroke prophylaxis and denies significant bleeding episodes, although she does get frequent bruises that resolve. She is on HD for ESRD. Recent echo indicates her EF is 35-40%. Her K is elevated 8/15/2018 but chart review indicates that she started kayexalate after this date.    Device Interrogation (8/31/2018) reveals stable lead and device function. HVR episodes x2, lasting 2 seconds. She paces 97% in the BiV. Estimated battery longevity 5 years. Flat HR curve (70bpm>90% of the time) correlating with symptoms of not feeling energetic. Sensor sensitivity increased by device clinic.    I have  personally reviewed the patient's EKG today, which shows AF with biventricular pacing at 72bpm. QRS is 150ms. QTc is 438.    Recent Cardiac Tests:    2D Echo (8/15/2018):  CONCLUSIONS     1 - Moderately depressed left ventricular systolic function (EF 35-40%).     2 - Wall motion abnormalities.     3 - Biatrial enlargement.     4 - Right ventricular enlargement with normal systolic function.     5 - The estimated PA systolic pressure is 37 mmHg.     6 - Mild to moderate mitral regurgitation.       Current Outpatient Medications   Medication Sig    ACZONE 5 % topical gel Apply to face every morning    atorvastatin (LIPITOR) 80 MG tablet TAKE 1 TABLET(80 MG) BY MOUTH EVERY NIGHT    betamethasone dipropionate (DIPROLENE) 0.05 % ointment AAA bid hand under occlusion    blood sugar diagnostic Strp 1 strip by Misc.(Non-Drug; Combo Route) route 2 (two) times daily with meals.    blood-glucose meter kit Use as instructed    cholecalciferol, vitamin D3, 1,000 unit capsule Take 1 capsule (1,000 Units total) by mouth once daily. (Patient taking differently: Take 1,000 Units by mouth every evening. )    clopidogrel (PLAVIX) 75 mg tablet Take 1 tablet (75 mg total) by mouth once daily.    diclofenac sodium (SOLARAZE) 3 % gel     fludrocortisone (FLORINEF) 0.1 mg Tab Take 100 mcg by mouth 2 (two) times daily.    gabapentin (NEURONTIN) 600 MG tablet Take 1 tablet (600 mg total) by mouth 2 (two) times daily.    ketorolac 0.5% (ACULAR) 0.5 % Drop instill one drop into both eyes every morning    lancets Misc 1 lancet by Misc.(Non-Drug; Combo Route) route 2 (two) times daily with meals.    lancing device Misc 1 Device by Misc.(Non-Drug; Combo Route) route 2 (two) times daily with meals.    levETIRAcetam (KEPPRA) 500 MG Tab Take 1 tablet (500 mg total) by mouth 2 (two) times daily.    linagliptin (TRADJENTA) 5 mg Tab tablet Take 1 tablet (5 mg total) by mouth once daily.    midodrine (PROAMATINE) 5 MG Tab     mupirocin  (BACTROBAN) 2 % ointment Apply topically once daily. To areas of infection/inflammation on leg    nortriptyline (PAMELOR) 25 MG capsule Take 1 capsule (25 mg total) by mouth every evening.    omeprazole (PRILOSEC) 20 MG capsule TAKE 1 CAPSULE BY MOUTH EVERY DAY    polyethylene glycol (GLYCOLAX) 17 gram/dose powder Take 17 g by mouth once daily. Dissolve in juice.    prednisoLONE acetate (PRED FORTE) 1 % DrpS INSTILL ONE DROP INTO  LEFT EYE THREE TIMES A DAY    predniSONE (DELTASONE) 10 MG tablet Take 2 tablets (20 mg total) by mouth once daily. Take with food.  After 10 days resume 10 mg daily dose.    PREVNAR 13, PF, 0.5 mL Syrg ADM 0.5ML IM UTD    DENISE-LINDA 0.8 mg Tab TK 1 T PO QD    RENVELA 800 mg Tab TAKE 1 TABLET BY MOUTH THREE TIMES DAILY WITH MEALS    sodium polystyrene (KAYEXALATE) 15 gram/60 mL Susp Take 60 mLs (15 g total) by mouth daily as needed. Take when directed by your doctor    tiZANidine (ZANAFLEX) 4 MG tablet 1 tablet by mouth every 8-12 hours as needed for muscle spasm    warfarin (COUMADIN) 5 MG tablet Take 1 tablet (5 mg total) by mouth Daily. Per [facility]in [facility] (Patient taking differently: Take 5 mg by mouth every evening. Per Coumadin Clinic)    warfarin (COUMADIN) 5 MG tablet TAKE 1 TABLET BY MOUTH EVERY DAY EXCEPT TAKE 1 1/2 ON MONDAY AND FRIDAY OR AS DIRECTED BY CLINIC    betamethasone dipropionate (DIPROLENE) 0.05 % ointment Apply topically 2 (two) times daily. To rash on left leg for 14 days    colchicine 0.6 mg tablet Take 1 tablet (0.6 mg total) by mouth once.     No current facility-administered medications for this visit.        Review of Systems   Constitution: Positive for malaise/fatigue.   Cardiovascular: Positive for dyspnea on exertion. Negative for chest pain, irregular heartbeat, leg swelling and palpitations.   Respiratory: Negative for shortness of breath.    Hematologic/Lymphatic: Negative for bleeding problem. Bruises/bleeds easily.   Skin: Negative for  "rash.   Musculoskeletal: Negative for myalgias.   Gastrointestinal: Negative for hematemesis, hematochezia and nausea.   Genitourinary: Negative for hematuria.   Neurological: Positive for light-headedness.   Psychiatric/Behavioral: Negative for altered mental status.   Allergic/Immunologic: Negative for persistent infections.     Objective:        BP (!) 118/58   Pulse 70   Ht 5' 8" (1.727 m)   Wt 118.8 kg (261 lb 14.5 oz)   LMP  (LMP Unknown)   SpO2 (!) 90%   BMI 39.82 kg/m²     Physical Exam   Constitutional: She is oriented to person, place, and time. She appears well-developed and well-nourished.   HENT:   Head: Normocephalic.   Nose: Nose normal.   Eyes: Pupils are equal, round, and reactive to light.   Cardiovascular: Normal rate, regular rhythm, S1 normal and S2 normal.   No murmur heard.  Pulses:       Radial pulses are 2+ on the right side, and 2+ on the left side.   Pulmonary/Chest: Breath sounds normal. No respiratory distress.   Device to LUCW   Abdominal: Normal appearance.   Musculoskeletal: Normal range of motion. She exhibits no edema.   Neurological: She is alert and oriented to person, place, and time.   Skin: Skin is warm and dry. No erythema.   AV fistula to LUE   Psychiatric: She has a normal mood and affect. Her speech is normal and behavior is normal.   Nursing note and vitals reviewed.      Lab Results   Component Value Date     08/15/2018    K 6.5 (HH) 08/15/2018    MG 2.7 (H) 08/15/2018    BUN 47 (H) 08/15/2018    CREATININE 7.8 (H) 08/15/2018    ALT 20 08/15/2018    AST 26 08/15/2018    HGB 15.3 08/15/2018    HCT 51.3 (H) 08/15/2018    HCT 31 (L) 01/21/2017    TSH 1.444 04/08/2017    LDLCALC 91.4 12/08/2017       Recent Labs   Lab  08/03/18   1009  08/15/18   1444  08/27/18   0734  08/31/18   1142   INR  2.8 H  3.3 H  1.8 H  2.6       Assessment:     1. Biventricular cardiac pacemaker in situ    2. ESRD (end stage renal disease) on dialysis    3. Current use of long term " anticoagulation    4. Morbid obesity with BMI of 40.0-44.9, adult    5. Permanent atrial fibrillation    6. Cardiomyopathy, unspecified type      Plan:     Biventricular cardiac pacemaker in situ  -     2D echo with color flow doppler; Future; Expected date: 08/31/2019    In summary, Ms. Medel is a 57 y.o. female with permanent AF (AVN RFA 6/2017), cardiomyopathy (EF 35-40%), CRT-P, pulmonary sarcoidosis/CRLD w/pHTN (on home O2 via NC), GERD, ESRD on HD w/anemia, a seizure, morbid obesity here for annual follow up. Device and lead function stable. No significant ventricular arrhythmia. BiV pacing 97%. Fluid controlled in dialysis - reassured patient that 5 hours HD time was appropriate.  Flat HR curve (70bpm>90% of the time) correlating with symptoms of not feeling energetic. Device clinic increased the sensitivity of the sensor. If this does not provide symptom relief, we can contact  for further strategies. Reviewed interrogation in device clinic - LV lead output acceptable at this time - will have patient come back for interrogation in 6 months for closer monitoring. She remains anticoagulated with coumadin.    Continue current medications.  RTC in 6 months, sooner if needed.    *A copy of this note was sent to Dr. Adorno*    Follow-up in about 6 months (around 2/28/2019).    ------------------------------------------------------------------    AYAN Lo, NP-C  [facility]

## 2018-09-04 NOTE — PROGRESS NOTES
Outside records received and reviewed from: Dr Cox (ophthalmology)  Date: from 2010 through 4/30/2018, eye exams. Handwritten notes, some portion difficult to read, 11 pages.  Followed for chronic uveitis/iritis, DM eye exam  Most recent eye exam done 4/30/18  Full record to be scanned in to Epic.

## 2018-09-13 NOTE — PROGRESS NOTES
Sisi Medel  2018    HPI:  Patient is a 55 y.o.  female with a h/o HTN, HLD, CHF (EF 20%), COPD on home O2, paroxysmal Afib (on Coumadin), seizure disorder, ESRD on HD TThS via LUE AVG (placed 2/3/16) who presents to clinic today for follow up. She was last seen in our clinic on 2018, and recently underwent the followin.  Left upper extremity fistulogram.  2.  PTA of mid graft stenosis x2, left upper extremity graft with 8 x 40   Conquest balloon.  3.  PTA of left upper extremity AV graft outflow stenosis with a 10 x 40 Cerrillos   balloon.  4.  PTA of left upper extremity AV graft in-stent restenosis of venous outflow   with 8 x 40 Conquest balloon.    She has done well since her last visit.    S/p   2/3/16: LUE AVG creation (Dr Villafana)  16: Percutaneous mechanical thrombectomy w Possis Angiojet AVX; 4fr OTW embolectomy of arterial inflow   PTA outflow stenosis with a 7x40 mm balloon  10/12/16: fistulogram (75% stenosis noted), left upper extremity AV graft PTA venous outflow with resolution of stenosis noted  2017 Declot and venous outflow PTA and stent with 8 x 50 VIABAHN  5/15/2017: PTA outflow stenosis (8x60 mustang); Stent outflow stenosis (8x50 Viabahn)  10/12/17: PTA LUE AV graft (brachial vein) x2: (7x60 Bells); (8x40 Cerrillos)   01/10/18: PTA outflow stensois (8x40 Cerrillos) and (9x40 Cerrillos)   18: PTA arterial inflow (7 x 40 Cerrillos)    No MI/stroke  Tobacco use: Former smoker     Past Medical History   Diagnosis Date    Anemia in ESRD (end-stage renal disease) 3/7/2016    Cervical radiculopathy 2015    Chronic combined systolic and diastolic heart failure 2013     EF 20% , improved with Medical therapy, negative PET     Chronic respiratory failure with hypoxia 2013     On home oxygen at 2-5 liters    Chronic rhinitis 10/2/2013    DDD (degenerative disc disease), lumbar 2015    ESRD on hemodialysis 2016    Essential  hypertension     Gout     Hyperlipidemia 3/7/2016    Lateral meniscal tear 2016    Lumbar stenosis 2015    Morbid obesity 8/15/2013    Paroxysmal atrial fibrillation 2014     Not on anticoagulation    Peripheral neuropathy 2013    Personal history of fall 2014    Personal history of gastric ulcer 3/19/2013    Sarcoidosis, lung 2009     Diagnosed in  with ocular manifestation, on 4L home O2 and PO steroids    Secondary pulmonary hypertension 2015    Seizure disorder 3/19/2013    Shingles 2013    Spondylarthrosis 2015     Past Surgical History   Procedure Laterality Date     section, classic      Abdominal surgery      Vascular surgery       Family History   Problem Relation Age of Onset    Kidney failure Mother     Hypertension Mother     Diabetes Mother     Coronary artery disease Father     Hypertension Father     Diabetes Father     Lupus Sister     Diabetes Maternal Grandmother     Asthma Neg Hx     Emphysema Neg Hx     Melanoma Neg Hx     Psoriasis Neg Hx      Social History     Social History    Marital status: Single     Spouse name: N/A    Number of children: N/A    Years of education: N/A     Occupational History    Not on file.     Social History Main Topics    Smoking status: Former Smoker     Packs/day: 0.50     Years: 10.00     Types: Cigarettes     Quit date: 1994    Smokeless tobacco: Never Used    Alcohol use No    Drug use: No    Sexual activity: No     Other Topics Concern    Not on file     Social History Narrative    Lives with boyfriend/partner who helps with her care.     Plans to travel to [place] for 2 weeks in 2016     Current Outpatient Prescriptions on File Prior to Visit   Medication Sig    ACZONE 5 % topical gel Apply topically once daily.     allopurinol (ZYLOPRIM) 100 MG tablet Take 1 tablet (100 mg total) by mouth once daily.    amiodarone (PACERONE) 200 MG Tab Take 1 tablet (200 mg  total) by mouth every morning.    ammonium lactate (LAC-HYDRIN) 12 % lotion Apply topically as needed (for scars on arms).     aspirin 81 MG Chew Take 81 mg by mouth every morning.    atorvastatin (LIPITOR) 80 MG tablet Take 80 mg by mouth once daily.     capsicum 0.075% (ZOSTRIX) 0.075 % topical cream Apply topically 3 (three) times daily. For neuropathic pain of feet    gabapentin (NEURONTIN) 100 MG capsule TAKE ONE CAPSULE BY MOUTH THREE TIMES DAILY (Patient taking differently: TAKE ONE CAPSULE BY MOUTH THREE TIMES DAILY-noon, evening, bedtime)    levetiracetam (KEPPRA) 500 MG Tab TAKE 1 TABLET BY MOUTH TWICE DAILY.    melatonin 5 mg Tab Take 1 tablet by mouth nightly.    midodrine (PROAMATINE) 10 MG tablet     midodrine (PROAMATINE) 5 MG Tab     NEPHRO-LINDA 0.8 mg Tab TK 1 T PO  QD    ondansetron (ZOFRAN-ODT) 8 MG TbDL Take 1 tablet (8 mg total) by mouth every 8 (eight) hours as needed.    oxycodone-acetaminophen (PERCOCET) 7.5-325 mg per tablet Take 1 tablet by mouth every 4 (four) hours as needed.    predniSONE (DELTASONE) 10 MG tablet TAKE 3 TABLETS BY MOUTH FOR 7 DAYS, THEN 2 TABLETS FOR 7 DAYS, THEN 1 TABLET EVERY DAY AFTER.    RENVELA 800 mg Tab TAKE 1 TABLET BY MOUTH THREE TIMES DAILY WITH MEALS    tizanidine 4 mg Cap Take 4 mg by mouth 2 (two) times daily as needed. (Patient taking differently: Take 4 mg by mouth daily as needed (for muscle spasms.). )    warfarin (COUMADIN) 5 MG tablet TAKE 1 TABLET(5 MG) BY MOUTH DAILY     No current facility-administered medications on file prior to visit.        REVIEW OF SYSTEMS:  General: negative; ENT: negative; Allergy and Immunology: negative; Hematological and Lymphatic: Negative; Endocrine: negative; Respiratory: no cough, shortness of breath, or wheezing; Cardiovascular: no chest pain or dyspnea on exertion; Gastrointestinal: no abdominal pain/back, change in bowel habits, or bloody stools; Genito-Urinary: no dysuria, trouble voiding, or  hematuria; Musculoskeletal: no pain, numbness, to LUE  Neurological: no TIA or stroke symptoms; Psychiatric: no nervousness, anxiety or depression.    PHYSICAL EXAM:   Vitals:    08/29/18 1446   BP: 111/68   Pulse: 70   Temp: 97.9 °F (36.6 °C)         General appearance:  Alert, well-appearing, and in no distress.  Oriented to person, place, and time   Neurological:  Normal speech, no focal findings noted; CN II - XII grossly intact           Musculoskeletal: Digits/nail without cyanosis/clubbing.  Normal muscle strength/tone.                 Neck: Supple, no significant adenopathy; thyroid is not enlarged                Chest:  Clear to auscultation, symmetric air entry      No use of accessory muscles             Cardiac: Normal rate and regular rhythm, S1 and S2 normal; PMI non-displaced          Abdomen: Soft, non-tender, non-distended, no masses or organomegaly      Extremities:   LUE AVG with palpable thrill, no increased pulsatility, 2+ distal radial pulse      LAB RESULTS:  Lab Results   Component Value Date    WBC 10.61 08/15/2018    HGB 15.3 08/15/2018    HCT 51.3 (H) 08/15/2018    MCV 97 08/15/2018     08/15/2018     BMP  Lab Results   Component Value Date     08/15/2018    K 6.5 (HH) 08/15/2018    CL 94 (L) 08/15/2018    CO2 28 08/15/2018    BUN 47 (H) 08/15/2018    CREATININE 7.8 (H) 08/15/2018    CALCIUM 10.3 08/15/2018    ANIONGAP 14 08/15/2018    ESTGFRAFRICA 6.1 (A) 08/15/2018    EGFRNONAA 5.3 (A) 08/15/2018     Lab Results   Component Value Date    HGBA1C 7.4 (H) 06/13/2018       IMAGING:         Velocities: in cm./sec    Inflow-176  arterial anastomosis-451  proximal graft-251  mid graft-261  distal graft-239  venous anastomosis-180  venous outflow(stent)-186  venous outflow(distal stent)- 136.     Overall Impression:  Color flow duplex exam reveals a patent left hemodialysis AV graft and outflow stents with no evidence of a hemodyanmically significant stenoisis.  Volume flow at mid  graft level measures 1388 ml./min.      IMP/PLAN:  55 y.o. female with HTN, HLD, CHF (EF 20%), COPD on home O2, paroxysmal Afib (on Coumadin), seizure disorder, ESRD on HD TThS via LUE AVG. Doing well at HD since most recent intervention.  Her flow volume is excellent.      1) continue statin, coumadin  2) RTC 8 weeks with HD duplex      Torrey Villafana MD  Vascular & Endovascular Surgery

## 2018-09-17 NOTE — PROGRESS NOTES
HISTORY OF PRESENT ILLNESS:    Sisi Medel is a 58 y.o. female , presents for a routine exam and has no new gyn complaints.    -States was told the lesions under her pannus were warts by Dermatology and thinks they are getting smaller.    Past Medical History:   Diagnosis Date    Anemia in ESRD (end-stage renal disease) 3/7/2016    Anticoagulant long-term use     Cervical radiculopathy 2015    CHF (congestive heart failure)     Chronic combined systolic and diastolic heart failure 2013    EF 20% , improved with Medical therapy, negative PET     Chronic respiratory failure with hypoxia 2013    On home oxygen at 2-5 liters    Closed fracture of proximal end of right fibula 2016    Complications due to renal dialysis device, implant, and graft     DDD (degenerative disc disease), lumbar 2015    Dependence on renal dialysis     Diabetes mellitus type II, controlled 2017    ESRD on hemodialysis 2016    Essential hypertension     Gout     Lateral meniscal tear 2016    Lumbar stenosis 2015    Obesity, Class III, BMI 40-49.9 (morbid obesity)     Pacemaker     Paroxysmal atrial fibrillation 2014    Not on anticoagulation    Peripheral neuropathy 2013    Personal history of gastric ulcer 3/19/2013    Sarcoidosis, lung 2009    Diagnosed in  with ocular manifestation, on 4L home O2 and PO steroids    Secondary pulmonary hypertension 2015    Seizure disorder 3/19/2013    Shingles 2013    Thyroid disease        Past Surgical History:   Procedure Laterality Date    ABDOMINAL SURGERY      ABLATION N/A 2017    Performed by [facility] at [facility] CATH LAB    ANGIOPLASTY Left 1/10/2018    Performed by Torrey Villafana MD at [facility] OR 2ND FLR    ANGIOPLASTY-PERCUTANEOUS TRANSLUMINAL (PTA) Left 2017    Performed by [facility]ana MD at [facility] OR 2ND FLR    ANGIOPLASTY-PERCUTANEOUS  TRANSLUMINAL (PTA) Left 10/12/2016    Performed by [facility] MD at [facility] OR 2ND FLR    CARDIAC PACEMAKER PLACEMENT       SECTION      x2    DECLOT GRAFT PERCUTANEOUS Left 2017    Performed by Torrey Villafana MD at [facility] OR 2ND FLR    DECLOT-GRAFT Left 2016    Performed by Harjit Starr MD at [facility] CATH LAB    DECLOT-GRAFT Left 2016    Performed by Harjit Starr MD at [facility] CATH LAB    ECHOCARDIOGRAM-TRANSESOPHAGEAL N/A 2013    Performed by Delmy Surgeon at [facility] DELMY    fistulogram Left 1/10/2018    Performed by Torrey Villafana MD at [facility] OR 2ND FLR    FISTULOGRAM Left 2017    Performed by Torrey Villafana MD at [facility] CATH LAB    FISTULOGRAM Left 10/12/2016    Performed by Torrey Villafana MD at [facility] OR 2ND FLR    FISTULOGRAM Left 2016    Performed by Harjit Starr MD at [facility] CATH LAB    INJECTION-STEROID-EPIDURAL-TRANSFORAMINAL Right 2015    Performed by Patria [facility]ierrez [facility] at [facility] MGT    INJECTION-STEROID-EPIDURAL-TRANSFORAMINAL Right 2015    [facility]    [id number] / Left upper AV graft. Left 2/3/2016    Performed by [facility]afana MD at [facility] OR 2ND FLR    QTDCQBUIY-GUHKQBSZW-[id number] N/A 2017    Performed by Bladimir Adorno MD at [facility] CATH LAB    OTHER SURGICAL HISTORY      loop recorder implant    PLACEMENT-STENT Left 2017    Performed by Torrey Villafana MD at [facility] OR 2ND FLR    stent to fistula      TRANSESOPHAGEAL ECHOCARDIOGRAM (JARAD) N/A 2016    Performed by Sourav Machuca MD at [facility] CATH LAB    ULTRASOUND, ENDOSCOPIC, UPPER GI TRACT N/A 2014    Performed by Stewart Burgess MD at [facility] ENDO (2ND FLR)    VASCULAR SURGERY      fistula to L upper arm         MEDICATIONS AND ALLERGIES:      Current Outpatient Medications:     ACZONE 5 % topical gel, Apply to face every morning, Disp: 60 g, Rfl: 2    atorvastatin  (LIPITOR) 80 MG tablet, TAKE 1 TABLET(80 MG) BY MOUTH EVERY NIGHT, Disp: 90 tablet, Rfl: 3    betamethasone dipropionate (DIPROLENE) 0.05 % ointment, AAA bid hand under occlusion, Disp: 45 g, Rfl: 3    blood sugar diagnostic Strp, 1 strip by Misc.(Non-Drug; Combo Route) route 2 (two) times daily with meals., Disp: 100 strip, Rfl: 3    blood-glucose meter kit, Use as instructed, Disp: 1 each, Rfl: 0    cholecalciferol, vitamin D3, 1,000 unit capsule, Take 1 capsule (1,000 Units total) by mouth once daily. (Patient taking differently: Take 1,000 Units by mouth every evening. ), Disp: , Rfl: 0    clopidogrel (PLAVIX) 75 mg tablet, Take 1 tablet (75 mg total) by mouth once daily., Disp: 30 tablet, Rfl: 11    diclofenac sodium (SOLARAZE) 3 % gel, , Disp: , Rfl:     fludrocortisone (FLORINEF) 0.1 mg Tab, Take 100 mcg by mouth 2 (two) times daily., Disp: , Rfl:     gabapentin (NEURONTIN) 600 MG tablet, Take 1 tablet (600 mg total) by mouth 2 (two) times daily., Disp: 60 tablet, Rfl: 5    ketorolac 0.5% (ACULAR) 0.5 % Drop, instill one drop into both eyes every morning, Disp: , Rfl: 3    lancets Misc, 1 lancet by Misc.(Non-Drug; Combo Route) route 2 (two) times daily with meals., Disp: 100 each, Rfl: 3    lancing device Misc, 1 Device by Misc.(Non-Drug; Combo Route) route 2 (two) times daily with meals., Disp: 1 each, Rfl: 0    levETIRAcetam (KEPPRA) 500 MG Tab, Take 1 tablet (500 mg total) by mouth 2 (two) times daily., Disp: 180 tablet, Rfl: 3    linagliptin (TRADJENTA) 5 mg Tab tablet, Take 1 tablet (5 mg total) by mouth once daily., Disp: 90 tablet, Rfl: 1    midodrine (PROAMATINE) 5 MG Tab, , Disp: , Rfl:     mupirocin (BACTROBAN) 2 % ointment, Apply topically once daily. To areas of infection/inflammation on leg, Disp: 30 g, Rfl: 0    nortriptyline (PAMELOR) 25 MG capsule, TAKE 1 CAPSULE BY MOUTH EVERY EVENING, Disp: 90 capsule, Rfl: 1    omeprazole (PRILOSEC) 20 MG capsule, TAKE 1 CAPSULE BY MOUTH  EVERY DAY, Disp: 90 capsule, Rfl: 4    polyethylene glycol (GLYCOLAX) 17 gram/dose powder, Take 17 g by mouth once daily. Dissolve in juice., Disp: 510 g, Rfl: 0    prednisoLONE acetate (PRED FORTE) 1 % DrpS, INSTILL ONE DROP INTO  LEFT EYE THREE TIMES A DAY, Disp: , Rfl: 3    predniSONE (DELTASONE) 10 MG tablet, Take 2 tablets (20 mg total) by mouth once daily. Take with food.  After 10 days resume 10 mg daily dose., Disp: 20 tablet, Rfl: 0    PREVNAR 13, PF, 0.5 mL Syrg, ADM 0.5ML IM UTD, Disp: , Rfl: 0    DENISE-LINDA 0.8 mg Tab, TK 1 T PO QD, Disp: , Rfl: 0    RENVELA 800 mg Tab, TAKE 1 TABLET BY MOUTH THREE TIMES DAILY WITH MEALS, Disp: 90 tablet, Rfl: 0    sodium polystyrene (KAYEXALATE) 15 gram/60 mL Susp, Take 60 mLs (15 g total) by mouth daily as needed. Take when directed by your doctor, Disp: 60 mL, Rfl: 11    tiZANidine (ZANAFLEX) 4 MG tablet, 1 tablet by mouth every 8-12 hours as needed for muscle spasm, Disp: 90 tablet, Rfl: 0    warfarin (COUMADIN) 5 MG tablet, Take 1 tablet (5 mg total) by mouth Daily. [facility] (Patient taking differently: Take 5 mg by mouth every evening. Per [facility]), Disp: 90 tablet, Rfl: 3    warfarin (COUMADIN) 5 MG tablet, TAKE 1 TABLET BY MOUTH EVERY DAY EXCEPT TAKE 1 1/2 ON MONDAY AND FRIDAY OR AS DIRECTED BY CLINIC, Disp: 40 tablet, Rfl: 0    colchicine 0.6 mg tablet, Take 1 tablet (0.6 mg total) by mouth once., Disp: 1 tablet, Rfl: 0    Review of patient's allergies indicates:   Allergen Reactions    Bactrim [sulfamethoxazole-trimethoprim] Other (See Comments)     Causes renal failure    Nsaids (non-steroidal anti-inflammatory drug) Other (See Comments)     Renal failure       Family History   Problem Relation Age of Onset    Kidney failure Mother     Hypertension Mother     Diabetes Mother     Coronary artery disease Father     Hypertension Father     Diabetes Father     Lupus Sister     Diabetes Maternal Grandmother     Colon cancer  Neg Hx     Ovarian cancer Neg Hx     Breast cancer Neg Hx        Social History     Socioeconomic History    Marital status: Single     Spouse name: Not on file    Number of children: Not on file    Years of education: Not on file    Highest education level: Not on file   Social Needs    Financial resource strain: Not on file    Food insecurity - worry: Not on file    Food insecurity - inability: Not on file    Transportation needs - medical: Not on file    Transportation needs - non-medical: Not on file   Occupational History    Not on file   Tobacco Use    Smoking status: Former Smoker     Packs/day: 0.50     Years: 10.00     Pack years: 5.00     Types: Cigarettes     Last attempt to quit: 1994     Years since quittin.4    Smokeless tobacco: Never Used   Substance and Sexual Activity    Alcohol use: No     Alcohol/week: 0.0 oz     Comment: rarely    Drug use: No    Sexual activity: Not Currently   Other Topics Concern    Are you pregnant or think you may be? No    Breast-feeding No   Social History Narrative    Lives with boyfriend/partner who helps with her care.        OBSTETRIC HISTORY: Number of vaginal deliveries: 1 and Number of cesareans: 1     COMPREHENSIVE GYN HISTORY:  PAP History: Denies abnormal Paps. LAST PAP 16 NORMAL.   Infection History: Denies STDs. Denies PID.  Benign History: Denies uterine fibroids. Denies ovarian cysts. Denies endometriosis. Denies other conditions.  Cancer History: Denies cervical cancer. Denies uterine cancer or hyperplasia. Denies ovarian cancer. Denies vulvar cancer or pre-cancer. Denies vaginal cancer or precancer.  Denies breast cancer. Denies colon cancer.  Sexual Activity History: Denies currently being sexually active.  Menstrual History: Denies menses. Pt is  not on HRT.     ROS:  GENERAL: + WT LOSS. No swelling. + CHRONIC FATIGUE. No fever.  CARDIOVASCULAR: No chest pain. + CHRONIC SOB. No leg cramps.   NEUROLOGICAL: No  "headaches. No vision changes.  BREASTS: No pain. No lumps. No discharge.  ABDOMEN: No pain. No nausea. No vomiting. No diarrhea. + CONSTIPATION.  REPRODUCTIVE: No abnormal bleeding.   VULVA: No pain. No lesions. No itching.  VAGINA: No relaxation. No itching. No odor. No discharge. No lesions.  URINARY: "DOESN"T MAKE URINE" No incontinence. No nocturia. No frequency. No dysuria.       BP 98/70   Ht 5' 8" (1.727 m)   Wt 119.8 kg (264 lb 1.8 oz)   LMP  (LMP Unknown)   BMI 40.16 kg/m²   8-21-17  lb    PE:  APPEARANCE: Well nourished, well developed, in no acute distress. WALKS WITH WALKER.  AFFECT: WNL, alert and oriented x 3.  SKIN: No hirsutism or acne.  NECK: Neck symmetric without masses or thyromegaly.  NODES: No inguinal, cervical, axillary or femoral lymph node enlargement.  CHEST: APPEARS SOB, ON PORTABLE O2, RR 16.   ABDOMEN: Soft. No tenderness or masses. There are DARK PIGMENTED IRREGULAR RAISED LESIONS IN LINEAR CONFIGURATION UNDER HER PANNUS.  BREASTS: PENDULOUS. Symmetrical, no skin changes or visible lesions. No palpable masses, nipple discharge bilaterally.  PELVIC: ATROPHIC EXTERNAL FEMALE GENITALIA without lesions. Normal hair distribution. Adequate perineal body, normal urethral meatus. VAGINA DRY / ATROPHIC without lesions or discharge. CERVIX STENOTIC without lesions, discharge or tenderness. No significant cystocele or rectocele. Bimanual exam shows uterus to be ? normal size, regular, mobile and nontender. Adnexa without masses or tenderness. EXAM DIFFICULT DUE TO BODY HABITUS.  RECTAL: Rectovaginal exam confirms above with normal sphincter tone, no masses.  EXTREMITIES: No edema.     DIAGNOSIS:  1. Well woman exam with routine gynecological exam    2. Postmenopause    3. Breast cancer screening        PLAN:    Orders Placed This Encounter    Mammo Digital Screening Bilat with Tomosynthesis_CAD   States up to date on colonoscopy    COUNSELING:  The patient was counseled today " on:  -A.C.S. Pap and pelvic exam guidelines (pap every 3 years, no pap after age 65) and recommendations for yearly mammogram;  -to see her PCP for other health maintenance.    FOLLOW-UP with me annually and PREET Alcantara NP in two years.

## 2018-10-08 NOTE — PROGRESS NOTES
INR within therapeutic range today. Bruising noted to arms from use. Patient denies any bleeding or other changes that may affect warfarin therapy. We will resume her weekly dose and follow up in 3 weeks. Patient reminded to call clinic with any changes or concerns.

## 2018-10-17 NOTE — PROGRESS NOTES
Sisi Medel  10/17/2018    HPI:  Patient is a 55 y.o.  female with a h/o HTN, HLD, CHF (EF 20%), COPD on home O2, paroxysmal Afib (on Coumadin), seizure disorder, ESRD on HD TThS via LUE AVG (placed 2/3/16) who presents to clinic today for follow up. She was last seen in our clinic on 8/2018, and recently underwent fistulagram with PTA of distal anastomosis and venous outflow.  She has done well at HD.  She also recently fell and lacerated her R leg, which has been improving but draining some serosanguinous fluid.       She has done well since her last visit.    S/p   2/3/16: LUE AVG creation (Dr Villafana)  6/21/16: Percutaneous mechanical thrombectomy w [place] Angiojet AVX; 4fr OTW embolectomy of arterial inflow   PTA outflow stenosis with a 7x40 mm balloon  10/12/16: fistulogram (75% stenosis noted), left upper extremity AV graft PTA venous outflow with resolution of stenosis noted  2/2017 Declot and venous outflow PTA and stent with 8 x 50 VIABAHN  5/15/2017: PTA outflow stenosis (8x60 [place]); Stent outflow stenosis (8x50 [place])  10/12/17: PTA LUE AV graft (brachial vein) x2: (7x60 [place]); (8x40 [place])   01/10/18: PTA outflow [place] (8x40 [place]) and (9x40 [place])   2/2/18: PTA arterial inflow (7 x 40 [place])  8/2018: 1.  PTA outflow stenosis 8 x 40 [place]; PTA outflow 10 x 40 [place] balloon.    No MI/stroke  Tobacco use: Former smoker     Past Medical History   Diagnosis Date    Anemia in ESRD (end-stage renal disease) 3/7/2016    Cervical radiculopathy 7/20/2015    Chronic combined systolic and diastolic heart failure 6/14/2013     EF 20% 2013, improved with Medical therapy, negative PET 2013    Chronic respiratory failure with hypoxia 04/22/2013     On home oxygen at 2-5 liters    Chronic rhinitis 10/2/2013    DDD (degenerative disc disease), lumbar 6/17/2015    ESRD on hemodialysis 2/23/2016    Essential hypertension     Gout     Hyperlipidemia 3/7/2016     Lateral meniscal tear 2016    Lumbar stenosis 2015    Morbid obesity 8/15/2013    Paroxysmal atrial fibrillation 2014     Not on anticoagulation    Peripheral neuropathy 2013    Personal history of fall 2014    Personal history of gastric ulcer 3/19/2013    Sarcoidosis, lung 2009     Diagnosed in  with ocular manifestation, on 4L home O2 and PO steroids    Secondary pulmonary hypertension 2015    Seizure disorder 3/19/2013    Shingles 2013    Spondylarthrosis 2015     Past Surgical History   Procedure Laterality Date     section, classic      Abdominal surgery      Vascular surgery       Family History   Problem Relation Age of Onset    Kidney failure Mother     Hypertension Mother     Diabetes Mother     Coronary artery disease Father     Hypertension Father     Diabetes Father     Lupus Sister     Diabetes Maternal Grandmother     Asthma Neg Hx     Emphysema Neg Hx     Melanoma Neg Hx     Psoriasis Neg Hx      Social History     Social History    Marital status: Single     Spouse name: N/A    Number of children: N/A    Years of education: N/A     Occupational History    Not on file.     Social History Main Topics    Smoking status: Former Smoker     Packs/day: 0.50     Years: 10.00     Types: Cigarettes     Quit date: 1994    Smokeless tobacco: Never Used    Alcohol use No    Drug use: No    Sexual activity: No     Other Topics Concern    Not on file     Social History Narrative    Lives with boyfriend/partner who helps with her care.     Plans to travel to [place] for 2 weeks in 2016     Current Outpatient Prescriptions on File Prior to Visit   Medication Sig    ACZONE 5 % topical gel Apply topically once daily.     allopurinol (ZYLOPRIM) 100 MG tablet Take 1 tablet (100 mg total) by mouth once daily.    amiodarone (PACERONE) 200 MG Tab Take 1 tablet (200 mg total) by mouth every morning.    ammonium lactate  (LAC-HYDRIN) 12 % lotion Apply topically as needed (for scars on arms).     aspirin 81 MG Chew Take 81 mg by mouth every morning.    atorvastatin (LIPITOR) 80 MG tablet Take 80 mg by mouth once daily.     capsicum 0.075% (ZOSTRIX) 0.075 % topical cream Apply topically 3 (three) times daily. For neuropathic pain of feet    gabapentin (NEURONTIN) 100 MG capsule TAKE ONE CAPSULE BY MOUTH THREE TIMES DAILY (Patient taking differently: TAKE ONE CAPSULE BY MOUTH THREE TIMES DAILY-noon, evening, bedtime)    levetiracetam (KEPPRA) 500 MG Tab TAKE 1 TABLET BY MOUTH TWICE DAILY.    melatonin 5 mg Tab Take 1 tablet by mouth nightly.    midodrine (PROAMATINE) 10 MG tablet     midodrine (PROAMATINE) 5 MG Tab     NEPHRO-LINDA 0.8 mg Tab TK 1 T PO  QD    ondansetron (ZOFRAN-ODT) 8 MG TbDL Take 1 tablet (8 mg total) by mouth every 8 (eight) hours as needed.    oxycodone-acetaminophen (PERCOCET) 7.5-325 mg per tablet Take 1 tablet by mouth every 4 (four) hours as needed.    predniSONE (DELTASONE) 10 MG tablet TAKE 3 TABLETS BY MOUTH FOR 7 DAYS, THEN 2 TABLETS FOR 7 DAYS, THEN 1 TABLET EVERY DAY AFTER.    RENVELA 800 mg Tab TAKE 1 TABLET BY MOUTH THREE TIMES DAILY WITH MEALS    tizanidine 4 mg Cap Take 4 mg by mouth 2 (two) times daily as needed. (Patient taking differently: Take 4 mg by mouth daily as needed (for muscle spasms.). )    warfarin (COUMADIN) 5 MG tablet TAKE 1 TABLET(5 MG) BY MOUTH DAILY     No current facility-administered medications on file prior to visit.        REVIEW OF SYSTEMS:  General: negative; ENT: negative; Allergy and Immunology: negative; Hematological and Lymphatic: Negative; Endocrine: negative; Respiratory: no cough, shortness of breath, or wheezing; Cardiovascular: no chest pain or dyspnea on exertion; Gastrointestinal: no abdominal pain/back, change in bowel habits, or bloody stools; Genito-Urinary: no dysuria, trouble voiding, or hematuria; Musculoskeletal: no pain, numbness, to  LUE  Neurological: no TIA or stroke symptoms; Psychiatric: no nervousness, anxiety or depression.    PHYSICAL EXAM:   Vitals:    10/17/18 1321   BP: 124/80   Pulse: 70         General appearance:  Alert, well-appearing, and in no distress.  Oriented to person, place, and time   Neurological:  Normal speech, no focal findings noted; CN II - XII grossly intact           Musculoskeletal: Digits/nail without cyanosis/clubbing.  Normal muscle strength/tone.                 Neck: Supple, no significant adenopathy; thyroid is not enlarged                Chest:  Clear to auscultation, symmetric air entry      No use of accessory muscles             Cardiac: Normal rate and regular rhythm, S1 and S2 normal; PMI non-displaced          Abdomen: Soft, non-tender, non-distended, no masses or organomegaly      Extremities:   LUE AVG with palpable thrill, no increased pulsatility, 2+ distal radial pulse      LAB RESULTS:  Lab Results   Component Value Date    WBC 10.61 08/15/2018    HGB 15.3 08/15/2018    HCT 51.3 (H) 08/15/2018    MCV 97 08/15/2018     08/15/2018     BMP  Lab Results   Component Value Date     08/15/2018    K 6.5 (HH) 08/15/2018    CL 94 (L) 08/15/2018    CO2 28 08/15/2018    BUN 47 (H) 08/15/2018    CREATININE 7.8 (H) 08/15/2018    CALCIUM 10.3 08/15/2018    ANIONGAP 14 08/15/2018    ESTGFRAFRICA 6.1 (A) 08/15/2018    EGFRNONAA 5.3 (A) 08/15/2018     Lab Results   Component Value Date    HGBA1C 7.4 (H) 06/13/2018       IMAGING:     10/2018      Inflow-123  arterial anastomosis- 358  proximal graft- 173  mid graft- 172  distal graft- 141  venous anastomosis-130  venous outflow(stent)- 75  venous outflow(distal stent)- 119.     Overall Impression:  Color flow duplex exam reveals a patent left hemodialysis AV graft and outflow stents with no evidence of a hemodyanmically significant stenoisis.  Volume flow at mid graft level measures 1264 ml./min.      IMP/PLAN:  55 y.o. female with HTN, HLD, CHF (EF  20%), COPD on home O2, paroxysmal Afib (on Coumadin), seizure disorder, ESRD on HD TThS via LUE AVG. Doing well at HD since most recent intervention.  Her flow volume is excellent.      1) continue statin, coumadin  2) RTC 8 weeks with LUE HD duplex      Torrey Villafana MD  Vascular & Endovascular Surgery

## 2018-10-19 NOTE — ASSESSMENT & PLAN NOTE
Patient is currently on Prednisone 10mg. Will change to Prednisone 20mg due to increased dyspnea. Will wean to Prednisone 15mg after.   -Repeat PFTs in 6 months.   -Prednisone 20mg times 1 month then decrease to Prednisone 15mg.

## 2018-10-19 NOTE — PROGRESS NOTES
"Subjective:       Patient ID: Sisi Medel is a 58 y.o. female.    Chief Complaint: Sarcoidosis    58 year old female with ESRD, HFrEF, and pulmonary sarcoidosis who presents for follow up evaluation. Patient is currently on 4 L NC. She states that when her Prednisone is decreased to 10 mg she begins to have symptoms of dyspnea. She was increased to 20mg in June and her symptoms improved.   Denies fever or chills.   Denies lower extremity edema and chest pain.       Review of Systems   Respiratory: Negative for cough, shortness of breath and dyspnea on extertion.        Objective:       Vitals:    10/19/18 0832   BP: 110/80   Pulse: 70   SpO2: 95%   Weight: 119.5 kg (263 lb 7.2 oz)   Height: 5' 8" (1.727 m)       Physical Exam   Constitutional: She is oriented to person, place, and time. She appears well-developed and well-nourished. No distress.   Cardiovascular: Normal rate and regular rhythm.   Pulmonary/Chest: Normal expansion, symmetric chest wall expansion and breath sounds normal.   Musculoskeletal: She exhibits no edema.   Neurological: She is alert and oriented to person, place, and time.   Skin: Skin is warm and dry. She is not diaphoretic.   Nursing note and vitals reviewed.    Personal Diagnostic Review  none pertinent  No flowsheet data found.      Assessment:       1. Sarcoidosis    2. Pulmonary sarcoidosis        Outpatient Encounter Medications as of 10/19/2018   Medication Sig Dispense Refill    ACZONE 5 % topical gel Apply to face every morning 60 g 2    atorvastatin (LIPITOR) 80 MG tablet TAKE 1 TABLET(80 MG) BY MOUTH EVERY NIGHT 90 tablet 3    betamethasone dipropionate (DIPROLENE) 0.05 % ointment AAA bid hand under occlusion 45 g 3    blood sugar diagnostic Strp 1 strip by Misc.(Non-Drug; Combo Route) route 2 (two) times daily with meals. 100 strip 3    blood-glucose meter kit Use as instructed 1 each 0    clopidogrel (PLAVIX) 75 mg tablet Take 1 tablet (75 mg total) by mouth " once daily. 30 tablet 11    colchicine 0.6 mg tablet Take 1 tablet (0.6 mg total) by mouth once. 1 tablet 0    diclofenac sodium (SOLARAZE) 3 % gel       fludrocortisone (FLORINEF) 0.1 mg Tab Take 100 mcg by mouth 2 (two) times daily.      gabapentin (NEURONTIN) 600 MG tablet Take 1 tablet (600 mg total) by mouth 2 (two) times daily. 60 tablet 5    ketorolac 0.5% (ACULAR) 0.5 % Drop instill one drop into both eyes every morning  3    lancets Misc 1 lancet by Misc.(Non-Drug; Combo Route) route 2 (two) times daily with meals. 100 each 3    lancing device Misc 1 Device by Misc.(Non-Drug; Combo Route) route 2 (two) times daily with meals. 1 each 0    levETIRAcetam (KEPPRA) 500 MG Tab Take 1 tablet (500 mg total) by mouth 2 (two) times daily. 180 tablet 3    linagliptin (TRADJENTA) 5 mg Tab tablet Take 1 tablet (5 mg total) by mouth once daily. 90 tablet 1    midodrine (PROAMATINE) 5 MG Tab       nortriptyline (PAMELOR) 25 MG capsule TAKE 1 CAPSULE BY MOUTH EVERY EVENING 90 capsule 1    omeprazole (PRILOSEC) 20 MG capsule TAKE 1 CAPSULE BY MOUTH EVERY DAY 90 capsule 4    prednisoLONE acetate (PRED FORTE) 1 % DrpS INSTILL ONE DROP INTO  LEFT EYE THREE TIMES A DAY  3    RENVELA 800 mg Tab TAKE 1 TABLET BY MOUTH THREE TIMES DAILY WITH MEALS 90 tablet 0    sodium polystyrene (KAYEXALATE) 15 gram/60 mL Susp Take 60 mLs (15 g total) by mouth daily as needed. Take when directed by your doctor 60 mL 11    warfarin (COUMADIN) 5 MG tablet Take 1 tablet (5 mg total) by mouth Daily. Per [facility] (Patient taking differently: Take 5 mg by mouth every evening. Per [facility]) 90 tablet 3    [DISCONTINUED] predniSONE (DELTASONE) 10 MG tablet Take 2 tablets (20 mg total) by mouth once daily. Take with food.  After 10 days resume 10 mg daily dose. 20 tablet 0    BOOSTRIX TDAP 2.5-8-5 Lf-mcg-Lf/0.5mL Syrg injection ADM 0.5ML IM UTD  0    cholecalciferol, vitamin D3, 1,000 unit capsule Take 1 capsule (1,000  Units total) by mouth once daily. (Patient taking differently: Take 1,000 Units by mouth every evening. )  0    mupirocin (BACTROBAN) 2 % ointment Apply topically once daily. To areas of infection/inflammation on leg 30 g 0    polyethylene glycol (GLYCOLAX) 17 gram/dose powder Take 17 g by mouth once daily. Dissolve in juice. 510 g 0    predniSONE (DELTASONE) 20 MG tablet Take 1 tablet (20 mg total) by mouth once daily. 30 tablet 0    PREVNAR 13, PF, 0.5 mL Syrg ADM 0.5ML IM UTD  0    DENISE-LINDA 0.8 mg Tab TK 1 T PO QD  0    tiZANidine (ZANAFLEX) 4 MG tablet TAKE 1 TABLET BY MOUTH EVERY DAY AS NEEDED FOR MUSCLE SPASMS 90 tablet 0    [DISCONTINUED] betamethasone dipropionate (DIPROLENE) 0.05 % ointment ENRIQUETA TO RASH ON LEFT LEG  BID FOR 14 DAYS  2    [DISCONTINUED] nortriptyline (PAMELOR) 25 MG capsule Take 1 capsule (25 mg total) by mouth every evening. 90 capsule 1    [DISCONTINUED] predniSONE (DELTASONE) 10 MG tablet TK 1 T PO ONCE D  2    [DISCONTINUED] tiZANidine (ZANAFLEX) 4 MG tablet 1 tablet by mouth every 8-12 hours as needed for muscle spasm 90 tablet 0    [DISCONTINUED] warfarin (COUMADIN) 5 MG tablet TAKE 1 TABLET BY MOUTH EVERY DAY EXCEPT TAKE 1 1/2 ON MONDAY AND FRIDAY OR AS DIRECTED BY CLINIC 102 tablet 0    [DISCONTINUED] warfarin (COUMADIN) 5 MG tablet TAKE 1 TABLET BY MOUTH EVERY DAY EXCEPT TAKE 1 1/2 ON MONDAY AND FRIDAY OR AS DIRECTED BY CLINIC 40 tablet 0     No facility-administered encounter medications on file as of 10/19/2018.      Orders Placed This Encounter   Procedures    OXYGEN FOR HOME USE     Order Specific Question:   Liter Flow     Answer:   4     Order Specific Question:   Duration     Answer:   Continuous     Order Specific Question:   Qualifying SpO2:     Answer:   88%     Order Specific Question:   Testing done at:     Answer:   Rest     Order Specific Question:   Portable mode:     Answer:   pulse dose acceptable     Order Specific Question:   Device     Answer:   home  "concentrator with portable unit     Order Specific Question:   Length of need (in months):     Answer:   99 mos     Order Specific Question:   Patient condition with qualifying saturation     Answer:   COPD     Comments:   and CHF     Order Specific Question:   Height:     Answer:   5' 8" (1.727 m)     Order Specific Question:   Weight:     Answer:   119.5 kg (263 lb 7.2 oz)     Order Specific Question:   Alternative treatment measures have been tried or considered and deemed clinically ineffective.     Answer:   Yes    Spirometry without Bronchodilator     Standing Status:   Future     Standing Expiration Date:   10/19/2019    LUNG VOLUMES     Standing Status:   Future     Standing Expiration Date:   10/19/2019    DLCO-Carbon Monoxide Diffusing Capacity     Standing Status:   Future     Standing Expiration Date:   10/19/2019     Plan:       Problem List Items Addressed This Visit        Pulmonary Problems    Pulmonary sarcoidosis (Chronic)    Current Assessment & Plan     Patient is currently on Prednisone 10mg. Will change to Prednisone 20mg due to increased dyspnea. Will wean to Prednisone 15mg after.   -Repeat PFTs in 6 months.   -Prednisone 20mg times 1 month then decrease to Prednisone 15mg.                Other Visit Diagnoses     Sarcoidosis    -  Primary    Relevant Orders    Spirometry without Bronchodilator    LUNG VOLUMES    DLCO-Carbon Monoxide Diffusing Capacity    OXYGEN FOR HOME USE        Follow up in 6 months with PFTs. Patient would like portable oxygen concentrator. Rx placed.     Jj Altamirano MD   "

## 2018-10-22 NOTE — PATIENT INSTRUCTIONS
CXR (phone report).  Continue present use of Prednisone and nasal O2.  Return visit 3 months with Spirometry and DLCO.      Chad Robles  1998    Medication: Adderall    Provider: Lucretia Valdivia MD  Preferred Contact Number: [phone] (mobile)    Pharmacy:  [facility] [phone]    Advised patient that the nurse will call if there are questions or concerns.

## 2018-10-25 NOTE — TELEPHONE ENCOUNTER
Lab linked:      Please call lab to see if GGT can be added on to this lab, given her elevated alkaline phosphatase level   Order placed.

## 2018-11-02 PROBLEM — R74.8 ELEVATED ALKALINE PHOSPHATASE LEVEL: Status: ACTIVE | Noted: 2018-01-01

## 2018-11-02 NOTE — PROGRESS NOTES
Subjective:       Patient ID: Sisi Medel is a 58 y.o. female.    Chief Complaint: Annual Exam    HPI  57 y/o woman with h/o sarcoidosis, chronic combined CHF (most recent EF 40-45%), PAF, HTN, ESRD on HD MWF, obesity, YOANDY, chronic respiratory failure, OA, DM2 here for annual exam.    Has had elevated alkaline phosphatase on labs previously, higher on most recent labs. GGT added, normal so this is more consistent with increase bone turnover rather than cholestasis or hepatic sarcoidosis. Has appt with Endocrine on 11/15/18.     DM2 / h/o borderline DM - A1c ranging from 5.5 to 6.7 over past 4 years, then increased to 7.4 on recheck 6/13/18. Improved to 6.7 on most recent check 10/2018  Following with DM educator. Has f/u scheduled with Endocrine.  On chronic steroids - of note dose was increased to 20mg (from 10) for 10 days starting 6/18/18 and again recently - currently on 20mg daily since 10/20.   Has had higher BG since that time, sometimes in the 200s. Well controlled prior to this.  Tradjenta started last visit - taking this without problem  Follows with Dr Mcintyre for eye exam   Saw podiatry last 8/2018 - following closely with Dr Hamlin for wound on R great toe which was healed at last visit there. Due for follow up.    Secondary adrenal insufficiency - saw Endocrine for this, on fludricortisone; per note could taper once off prednisone but this is a long-term chronic medication.    Following with [facility] / Ortho for wounds on hands, neuropathy / pain.  L hand wound healed, R hand wound healing but still present.   Did have mild elevated uric acid level, took colchicine x 1 tablet which per Ortho note did help (patient does not recall). Not having joint pain in hands.   Seeing neurology next month as well.    Atrial fibrillation, CHF - afib onset 6/2016, underwent JARAD/DC cardioversion at that time but has been in paroxysmal AF since. Follows with cardiology (Dr Adorno, Dr Chavarria) and with  "coumadin clinic.   Seen in arrythmia clinic 8/2018, had sensitivity of pacemaker sensor adjusted, she felt like this helped somewhat.  On midodrine, ASA, plavix, atorvastatin    ESRD on HD TTS - follows with Dr Aaron Tesfaye for dialysis.  Follows with Vascular Surgery here for graft problems.  Anuric. Does say she sometimes "goes over my fluid level" especially on weekends but says this doesn't cause her more symptoms. Taking renvela. Elevated PTH.  Has kayexalate to use prn for high potassium - not sure of name, says her medication for potassium is a powder?  Low vitamin D - takes supplement  Recurrent sores, saw dermatology for this, noted to have disorder related to ESRD    Sarcoidosis, CRLD, chronic hypoxia - follows with Dr Altamirano (previously Dr Harvey) with Pulmonology. On chronic steroids (dose increased x 10 days at last visit 6/2018, increased again 10/20/18 to 20mg), on chronic home O2.  Portable O2 concentrator ordered by her pulmonologist recently.    Seizure disorder - on keppra, following with neurology for this as well as for her neuropathy.   Takes gabapentin for peripheral neuropathy, voltaren gel also added at visit with Dr Lester.  Has appt scheduled for follow up 11/2018  H/o TIA 12/2017 - noted as TIA induced by hypotension, no abnormalities on CTA at that time.    Tried PT to help with balance, didn't feel that it helped, "I can do those exercises at home."   Did have a fall recently while at her son's wedding, reports skin abrasion R lower leg but no other injuries from this.  Had TDaP recently, has had flu vaccine in October at HD    Review of Systems   Constitutional: Positive for fatigue (chronic). Negative for activity change, appetite change and fever.   HENT: Negative.    Eyes: Negative for visual disturbance.   Respiratory: Positive for shortness of breath (baseline, as noted). Negative for cough and wheezing.    Cardiovascular: Negative for chest pain, palpitations and leg swelling. " "  Gastrointestinal: Negative for abdominal pain, constipation, diarrhea and nausea.   Endocrine: Negative for polydipsia and polyuria.   Genitourinary: Positive for decreased urine volume (little to no urination (on HD, chronic)).   Musculoskeletal: Positive for arthralgias (improved), gait problem (uses walker at baseline), joint swelling (as noted) and myalgias (muscle aches, foot pain, foot cramps, neck cramps since starting HD; chronic).   Skin: Negative for rash.        As noted   Neurological: Positive for numbness (paresthesias / burning, both feet, also hands - chronic). Negative for dizziness, seizures, speech difficulty, weakness (generalized, intermittent; not worse currently) and light-headedness.   Psychiatric/Behavioral: Negative for dysphoric mood (depressed mood related to health issues somes but currently doing well).         Past medical history, surgical history, and family medical history reviewed and updated as appropriate.    Medications and allergies reviewed.     Objective:          Vitals:    11/02/18 0912   BP: 110/70   BP Location: Right arm   Patient Position: Sitting   Pulse: 72   Temp: 97.7 °F (36.5 °C)   TempSrc: Oral   SpO2: 97%   Weight: 119.9 kg (264 lb 5.3 oz)   Height: 5' 8" (1.727 m)     Body mass index is 40.19 kg/m².  Physical Exam   Constitutional: She is oriented to person, place, and time. She appears well-developed and well-nourished. No distress.   Sitting comfortably, speaking in full sentences. Wearing portable O2.    HENT:   Head: Normocephalic and atraumatic.   Mouth/Throat: Oropharynx is clear and moist.   Eyes: EOM are normal. Pupils are equal, round, and reactive to light. No scleral icterus.   Conjunctival injection b/l   Neck: Neck supple. No JVD present.   Cardiovascular: Normal rate, regular rhythm and normal heart sounds.   No murmur heard.  Pulmonary/Chest: Effort normal and breath sounds normal. No respiratory distress. She has no wheezes. She has no rales. "   Abdominal: Soft. Bowel sounds are normal. There is no tenderness.   Musculoskeletal: She exhibits no edema or tenderness.   No joint pain or erythema   Lymphadenopathy:     She has no cervical adenopathy.   Neurological: She is alert and oriented to person, place, and time. She has normal strength. No cranial nerve deficit or sensory deficit. Gait normal.   Skin: Skin is warm and dry. She is not diaphoretic.   Healed wound L hand web space; partially healed wound R hand web space   Psychiatric: She has a normal mood and affect. Her behavior is normal. Thought content normal.   Vitals reviewed.      Lab Results   Component Value Date    WBC 10.61 08/15/2018    HGB 15.3 08/15/2018    HCT 51.3 (H) 08/15/2018     08/15/2018    CHOL 169 12/08/2017    TRIG 98 12/08/2017    HDL 58 12/08/2017    ALT 23 10/24/2018    AST 26 10/24/2018     10/24/2018    K 5.2 (H) 10/24/2018    CL 93 (L) 10/24/2018    CREATININE 6.4 (H) 10/24/2018    BUN 50 (H) 10/24/2018    CO2 28 10/24/2018    TSH 1.444 04/08/2017    INR 2.1 10/08/2018    HGBA1C 6.7 (H) 10/24/2018       Assessment:       1. Annual physical exam    2. Pulmonary sarcoidosis    3. CRLD (chronic restrictive lung disease)    4. Chronic respiratory failure with hypoxia    5. Pulmonary hypertension    6. PVD (peripheral vascular disease)    7. Hemodialysis-associated hypotension    8. Permanent atrial fibrillation    9. Chronic combined systolic and diastolic heart failure    10. Mixed hyperlipidemia    11. End stage renal failure on dialysis    12. Hyperkalemia    13. Anemia in ESRD (end-stage renal disease)    14. Controlled type 2 diabetes mellitus with complication, without long-term current use of insulin    15. Current chronic use of systemic steroids    16. Multinodular thyroid    17. Secondary adrenal insufficiency    18. Elevated alkaline phosphatase level    19. Osteopenia determined by x-ray    20. Polyneuropathy associated with underlying disease    21.  Seizure disorder    22. Skin ulcer of toe of right foot, limited to breakdown of skin        Plan:   Sisi was seen today for annual exam.    Diagnoses and all orders for this visit:    Annual physical exam -  Overall stable, no acute concerns. Reviewed chronic and preventive health concerns.    Pulmonary sarcoidosis  CRLD (chronic restrictive lung disease)  Chronic respiratory failure with hypoxia - currently doing better on higher dose of steroids. Reviewed recent pulmonary notes.     Pulmonary hypertension    PVD (peripheral vascular disease) - continue statin    Hemodialysis-associated hypotension - stable on midodrine    Permanent atrial fibrillation   Chronic combined systolic and diastolic heart failure - continue to follow with cardiology; stable currently    Mixed hyperlipidemia - continue statin    End stage renal failure on dialysis  Hyperkalemia - stable though not following consistent fluid restriction; encouraged her to continue working with her dialysis team to ensure appropriate volume removal, fluid-restrict when she is able    Anemia in ESRD (end-stage renal disease) - reviewed labs, stable    Controlled type 2 diabetes mellitus with complication, without long-term current use of insulin - improvement with tradjenta; reviewed importance of DM diet as if at all possible we would like to avoid needing to add any other medications  Expect higher glucose with higher baseline steroid dosing  Follow up with Endocrine, Podiatry    Current chronic use of systemic steroids - requiring higher doses, leading to secondary adrenal insufficiency and now requiring medication for glucose control. Also with concern for bone mineral     Multinodular thyroid - clinically euthyroid; due for TSH recheck. Follow up with Endocrine    Secondary adrenal insufficiency - on florinef, follow with Endocrine    Elevated alkaline phosphatase level - GGT normal, so more consistent with bone source / increased bone turnover --  has osteopenia, and likely element of renal bone mineral disorder. Chronic steroids also likely contributing. Recommend discuss with Endocrine as well.     Osteopenia determined by x-ray - DEXA done last year, will continue to monitor    Polyneuropathy associated with underlying disease    Seizure disorder    Skin ulcer of toe of right foot, limited to breakdown of skin - healed but at high risk for recurrence, especially related to skin disorder related to ESRD. Continue close follow up with     Health maintenance reviewed with patient.     Follow-up in about 4 months (around 3/2/2019) for coordination of care.    Carlos A Caputo MD  Internal Medicine  [facility]  11/5/2018

## 2018-11-02 NOTE — PATIENT INSTRUCTIONS
Managing Diabetes: The A1C Test       Healthy red blood cells have some glucose stuck to them. A high A1C means that unhealthy amounts of glucose are stuck to the cells.   What is the A1C test?  Using your meter helps you track your blood sugar every day. But your glucose meter tells you the value at the time of testing only. You also need to know if your treatment plan is keeping you healthy over time. The hemoglobin A1C (or glycated hemoglobin) test can help. This test measures your average blood sugar level over a few months. A higher A1C result means that you have a higher risk of developing complications.  The A1C test  The A1C is a blood test done by your healthcare provider. You will likely have an A1C test every 3 to 6 months.  Your blood glucose goal  A1C has been shown as a percentage. But it can also be shown as a number representing the estimated Average Glucose (eAG). Unlike the A1C percentage, eAG is a number similar to the numbers listed on your daily glucose monitor. Both A1C and eAG measure the amount of glucose stuck to a protein called hemoglobin in red blood cells. Your healthcare provider will help you figure out what your ideal A1C or eAG should be. Your target number will depend on your age, general health, and other factors. If your current number is too high, your treatment plan may need changes, such as different medicines.  Sample results  Most people aim for an A1c lower than 7%. Thats an eAG less than 154 mg/dL. Or, your healthcare provider may want you to aim for an A1C of 6%. Thats an eAG of 126 mg/dL.     Glucose calculator  Visit http://professional.diabetes.org/diapro/glucose_calc for a chart that helps convert your A1C percentages into eAG numbers.   Date Last Reviewed: 6/1/2016  © 8835-8683 [employer]. [address] [place] [address], [place] [address]. All rights reserved. This information is not intended as a substitute for professional medical care. Always follow your  healthcare professional's instructions.

## 2018-11-02 NOTE — PROGRESS NOTES
INR subtherapeutic today for no apparent reason. Med card states that colchcine was discontinued but patient states that she will still be taking it. She will call her PCP for clarification. She also states that her prednisone was increased last week. She denies any bleeding, bruising or other changes. EMG scheduled for 11/7. Message sent to Syeda SANTA for clarification on holding instructions. We will boost her dose today then resume. Follow up in 1.5 weeks. Care plan made with BARRY Gordillo D

## 2018-11-05 NOTE — PROGRESS NOTES
Patient advised to hold warfarin dose on 11/5 & 11/6/18    Advised lab early 11/7/18   Patient verbalized understanding

## 2018-11-05 NOTE — PROGRESS NOTES
*EMG scheduled for 11/7. Needs to be <1.3*    We will need current INR to dose appropriately for procedure.

## 2018-11-05 NOTE — PROGRESS NOTES
patient refused INR on 11/6; agreed to INR early AM 11/7. Due to procedure on 11/7 we will need to hold coumadin until her next INR.

## 2018-11-07 NOTE — PROGRESS NOTES
Diabetes Education  Author: Zarina Watson RD  Date: 10/24/2018    Diabetes Care Management Summary  Diabetes Education Record Progress: Comprehensive/Group (f/u)     Diabetes Type  Diabetes Type : Type II    Diabetes History  Diabetes Diagnosis: 0-1 year    Current Diabetes Treatment   Current Treatment:  tradjenta 5 mg daily       Health Maintenance was reviewed today with patient. Discussed with patient importance of routine eye exams, foot exams/foot care, blood work (i.e.: A1c, microalbumin, and lipid), dental visits, yearly flu vaccine, and pneumonia vaccine as indicated by PCP. Patient verbalized understanding.     Health Maintenance Topics with due status: Not Due       Topic Last Completion Date    Colonoscopy 10/01/2011    Pneumococcal PPSV23 (Medium Risk) 2016    DEXA SCAN 2017    Lipid Panel 2017    Pap Smear with HPV Cotest 2018    Eye Exam 2018    Foot Exam 2018    Mammogram 2018    TETANUS VACCINE 10/19/2018    Hemoglobin A1c 10/24/2018     There are no preventive care reminders to display for this patient.    Nutrition  Meal Planning: (cooking less lately: more sandwiches; 1-2 pickles daily (less than used to be a lot more))  What type of beverages do you drink?: diet soda/tea (crystal light)      Monitoring   Self Monitoring :   before most recent steroid increase: FB-149 (10 mg prednisone); pre-lunch: 112-194; post prandial: ;   since steroid increase: FB, 208; post-prandial: 183, 219    Blood Glucose Logs: Yes  In the last month, how often have you had a low blood sugar reaction?: never    Barriers to Change: None  Learning Challenges : None         Diabetes Education Assessment/Progress  Diabetes Disease Process (diabetes disease process and treatment options): Discussion, Individual Session(Reviewed disease process; reviewed effects of daily steroid use on BG's. Also discussed effects of dialysis and CHF in congruence with diabetes  dx.)  Nutrition (Incorporating nutritional management into one's lifestyle): Discussion, Needs Review, Individual Session, Written Materials Provided, Instructed(Reviewed importance of eating small meals throughout the day. Discussed options of foods/snacks amenable to pt - encouraged to eat snack/meal every morning, afternoon, and evening.)  Physical Activity (incorporating physical activity into one's lifestyle): Discussion, Individual Session(Encouraged continuance and reviewed effects of PA on BG control.)  Medications (states correct name, dose, onset, peak, duration, side effects & timing of meds): Discussion, Individual Session, Comprehends Key Points(Discussed timing and MOA of tradjenta. Discussed possible side effects and how to avoid these.)  Monitoring (monitoring blood glucose/other parameters & using results): Discussion, Instructed, Individual Session, Written Materials Provided, Comprehends Key Points(She has been testing as instructed since last visit. Discussed BG goals and encouraged to continue testing once a day.)  Acute Complications (preventing, detecting, and treating acute complications): Individual Session, Discussion  Chronic Complications (preventing, detecting, and treating chronic complications): Discussion, Individual Session, Instructed, Comprehends Key Points(Reviewed list of possible future complications, as well as worsening of ESRD and CHF with uncontrolled diabetes.)  Cognitive (knowledge of self-management skills, functional health literacy): Discussion, Individual Session(She displays knowledge of self-management skills and continues to work towards goals.)  Behavioral (readiness for change, lifestyle practices, self-care behaviors): Needs Instruction, Needs Review    Goals  Patient has selected/evaluated goals during today's session: No           Diabetes Care Plan/Intervention  Education Plan/Intervention: Individual Follow-Up DSMT      Diabetes Meal Plan  Restrictions:  Restricted Carbohydrate, Low Sodium, Low Potassium, Fluid Restriction      Today's Self-Management Care Plan was developed with the patient's input and is based on barriers identified during today's assessment.    The long and short-term goals in the care plan were written with the patient/caregiver's input. The patient has agreed to work toward these goals to improve her overall diabetes control.      The patient received a copy of today's self-management plan and verbalized understanding of the care plan, goals, and all of today's instructions.      The patient was encouraged to communicate with her physician and care team regarding her condition(s) and treatment.  I provided the patient with my contact information today and encouraged her to contact me via phone or patient portal as needed.       Education Units of Time   Time Spent: 30 min

## 2018-11-12 NOTE — PROGRESS NOTES
[facility]  History & Physical  Orthopedics    SUBJECTIVE:      Chief Complaint: right hand numbness    Referring Provider: KIET Mane      History of Present Illness:  Patient is a 58 y.o. right hand dominant female who presents today with complaints of right hand numbness for the past year. She has been followed by Syeda Becker who ordered an EMG which showed mild-moderate R CTS, mild L CTS. She has also been seen for a mass associated with her right long finger MCP joint. An ultrasound there showed a non-specific fluid collection. This has since resolved without intervention.  She reports that the numbness is present in her thumb at all times, but occasionally involves the entire hand. Symptoms are worst during the day and not particularly bothersome at night. She also has ulcerations in the 1st webspaces bilaterally which have been improving with Medihoney.     Significant comorbidities including chronic restrictive lung disease, ESRD on dialysis (AV fistula LUE), recent diagnosis of DM (last Hgb A1c 6.7), Afib, adrenal insufficiency, gout    The patient is disabled.    Onset of symptoms/DOI was about a year ago.    Symptoms are aggravated by activity and during the day.    Symptoms are better at night.    Symptoms consist of pain, numbness.    The patient rates their pain as a 0/10.    Attempted treatment(s) and/or interventions include no previous treatment.     The patient denies any fevers, chills, N/V, D/C and presents for evaluation.       Past Medical History:   Diagnosis Date    Anemia in ESRD (end-stage renal disease) 3/7/2016    Anticoagulant long-term use     Cervical radiculopathy 7/20/2015    CHF (congestive heart failure)     Chronic combined systolic and diastolic heart failure 6/14/2013    EF 20% 2013, improved with Medical therapy, negative PET 2013    Chronic respiratory failure with hypoxia 04/22/2013    On home oxygen at 2-5 liters    Closed fracture  of proximal end of right fibula 2016    Complications due to renal dialysis device, implant, and graft     DDD (degenerative disc disease), lumbar 2015    Dependence on renal dialysis     Diabetes mellitus type II, controlled 2017    ESRD on hemodialysis 2016    Essential hypertension     Gout     Lateral meniscal tear 2016    Lumbar stenosis 2015    Obesity, Class III, BMI 40-49.9 (morbid obesity)     Pacemaker     Paroxysmal atrial fibrillation 2014    Not on anticoagulation    Peripheral neuropathy 2013    Personal history of gastric ulcer 3/19/2013    Sarcoidosis, lung 2009    Diagnosed in  with ocular manifestation, on 4L home O2 and PO steroids    Secondary pulmonary hypertension 2015    Seizure disorder 3/19/2013    Shingles 2013    Thyroid disease      Past Surgical History:   Procedure Laterality Date    ABDOMINAL SURGERY      ABLATION N/A 2017    Performed [facility] at [facility] CATH LAB    ANGIOPLASTY Left 1/10/2018    Performed by Torrey [facility]afana [facility] at [facility] OR 2ND FLR    ANGIOPLASTY-PERCUTANEOUS TRANSLUMINAL (PTA) Left 2017    Per[facility] by [facility] OR 2ND FLR    ANGIOPLASTY-PERCUTANEOUS TRANSLUMINAL (PTA) Left 10/12/2016    Performed by [facility] at [facility] OR 2ND FLR    CARDIAC PACEMAKER PLACEMENT       SECTION      x2    DECLOT GRAFT PERCUTANEOUS Left 2017    Performed by [facility] at [facility] OR 2ND FLR    DECLOT-GRAFT Left 2016    Performed by Harjit Starr MD at [facility] CATH LAB    DECLOT-GRAFT Left 2016    Performed by [facility]it Starr [facility] at [facility] CATH LAB    ECHOCARDIOGRAM-TRANSESOPHAGEAL N/A 2013    Performed by Delmy Surgeon at [facility] DELMY    fistulogram Left 1/10/2018    Performed by [facility] at [facility] OR 2ND FLR    FISTULOGRAM Left 2017    Performed by [facility]af[facility] CATH LAB     FISTULOGRAM Left 10/12/2016    [facility] 2ND F[facility]    FISTULOGRAM Left 6/21/2016    Performed by [facility]it St[facility] at [facility] CATH LAB    INJECTION-STEROID-EPIDURAL-TRANSFORAMINAL Right 7/20/2015    [facility] PAIN MGT    INJECTION-STEROID-EPIDURAL-TRANSFORAMINAL Right 5/21/2015    [facility] at [facility] MGT    EFMUIPAFF-CIPCY-[id number] / Left upper AV graft. Left 2/3/2016    Performed by [facility] at [facility] OR 2ND FLR    PTVTTYZBU-QLUCNTTBU-GHSYXYJZIOQFB N/A 1/11/2017    Performed by Bladimir Adorno [facility] at [facility] CATH LAB    OTHER SURGICAL HISTORY      loop recorder implant    PLACEMENT-STENT Left 2/7/2017    Performed by [facility] at [facility] OR 2ND FLR    stent to fistula      TRANSESOPHAGEAL ECHOCARDIOGRAM (JARAD) N/A 6/27/2016    Performed by Sourav Machuca MD at [facility] CATH LAB    ULTRASOUND, UPPER GI TRACT, ENDOSCOPIC N/A 1/9/2014    Performed by Stewart Burgess MD at [facility] ENDO (2ND FLR)    VASCULAR SURGERY      fistula to L upper arm      Review of patient's allergies indicates:   Allergen Reactions    Bactrim [sulfamethoxazole-trimethoprim] Other (See Comments)     Causes renal failure    Nsaids (non-steroidal anti-inflammatory drug) Other (See Comments)     Renal failure     Social History     Social History Narrative    Lives with boyfriend/partner who helps with her care.      Family History   Problem Relation Age of Onset    Kidney failure Mother     Hypertension Mother     Diabetes Mother     Coronary artery disease Father     Hypertension Father     Diabetes Father     Lupus Sister     Diabetes Maternal Grandmother     Colon cancer Neg Hx     Ovarian cancer Neg Hx     Breast cancer Neg Hx          Current Outpatient Medications:     ACZONE 5 % topical gel, Apply to face every morning, Disp: 60 g, Rfl: 2    atorvastatin (LIPITOR) 80 MG tablet, TAKE 1 TABLET(80 MG) BY MOUTH  EVERY NIGHT, Disp: 90 tablet, Rfl: 3    betamethasone dipropionate (DIPROLENE) 0.05 % ointment, AAA bid hand under occlusion, Disp: 45 g, Rfl: 3    blood sugar diagnostic Strp, 1 strip by Misc.(Non-Drug; Combo Route) route 2 (two) times daily with meals., Disp: 100 strip, Rfl: 3    blood-glucose meter kit, Use as instructed, Disp: 1 each, Rfl: 0    BOOSTRIX TDAP 2.5-8-5 Lf-mcg-Lf/0.5mL Syrg injection, ADM 0.5ML IM UTD, Disp: , Rfl: 0    cholecalciferol, vitamin D3, 1,000 unit capsule, Take 1 capsule (1,000 Units total) by mouth once daily. (Patient taking differently: Take 1,000 Units by mouth every evening. ), Disp: , Rfl: 0    clopidogrel (PLAVIX) 75 mg tablet, Take 1 tablet (75 mg total) by mouth once daily., Disp: 30 tablet, Rfl: 11    diclofenac sodium (SOLARAZE) 3 % gel, , Disp: , Rfl:     fludrocortisone (FLORINEF) 0.1 mg Tab, Take 100 mcg by mouth 2 (two) times daily., Disp: , Rfl:     ketorolac 0.5% (ACULAR) 0.5 % Drop, instill one drop into both eyes every morning, Disp: , Rfl: 3    lancets Misc, 1 lancet by Misc.(Non-Drug; Combo Route) route 2 (two) times daily with meals., Disp: 100 each, Rfl: 3    lancing device Misc, 1 Device by Misc.(Non-Drug; Combo Route) route 2 (two) times daily with meals., Disp: 1 each, Rfl: 0    levETIRAcetam (KEPPRA) 500 MG Tab, Take 1 tablet (500 mg total) by mouth 2 (two) times daily., Disp: 180 tablet, Rfl: 3    linagliptin (TRADJENTA) 5 mg Tab tablet, Take 1 tablet (5 mg total) by mouth once daily., Disp: 90 tablet, Rfl: 1    midodrine (PROAMATINE) 5 MG Tab, , Disp: , Rfl:     mupirocin (BACTROBAN) 2 % ointment, Apply topically once daily. To areas of infection/inflammation on leg, Disp: 30 g, Rfl: 0    omeprazole (PRILOSEC) 20 MG capsule, TAKE 1 CAPSULE BY MOUTH EVERY DAY, Disp: 90 capsule, Rfl: 4    polyethylene glycol (GLYCOLAX) 17 gram/dose powder, Take 17 g by mouth once daily. Dissolve in juice., Disp: 510 g, Rfl: 0    prednisoLONE acetate (PRED  "FORTE) 1 % DrpS, INSTILL ONE DROP INTO  LEFT EYE THREE TIMES A DAY, Disp: , Rfl: 3    predniSONE (DELTASONE) 20 MG tablet, Take 1 tablet (20 mg total) by mouth once daily., Disp: 30 tablet, Rfl: 0    pregabalin (LYRICA) 100 MG capsule, Take 1 capsule (100 mg total) by mouth 2 (two) times daily., Disp: 180 capsule, Rfl: 1    PREVNAR 13, PF, 0.5 mL Syrg, ADM 0.5ML IM UTD, Disp: , Rfl: 0    DENISE-LINDA 0.8 mg Tab, TK 1 T PO QD, Disp: , Rfl: 0    RENVELA 800 mg Tab, TAKE 1 TABLET BY MOUTH THREE TIMES DAILY WITH MEALS, Disp: 90 tablet, Rfl: 0    sodium polystyrene (KAYEXALATE) 15 gram/60 mL Susp, Take 60 mLs (15 g total) by mouth daily as needed. Take when directed by your doctor, Disp: 60 mL, Rfl: 11    tiZANidine (ZANAFLEX) 4 MG tablet, TAKE 1 TABLET BY MOUTH EVERY DAY AS NEEDED FOR MUSCLE SPASMS, Disp: 90 tablet, Rfl: 0    warfarin (COUMADIN) 5 MG tablet, Take 1 tablet (5 mg total) by mouth Daily. [facility] (Patient taking differently: Take 5 mg by mouth every evening. Per [facility]), Disp: 90 tablet, Rfl: 3      Review of Systems:  Constitutional: no fever or chills  Eyes: no visual changes  ENT: no nasal congestion or sore throat  Respiratory: no cough or shortness of breath  Cardiovascular: no chest pain  Gastrointestinal: no nausea or vomiting, tolerating diet  Musculoskeletal: pain and numbness/tingling    OBJECTIVE:      Vital Signs (Most Recent):  Vitals:    11/12/18 0948   BP: 110/75   BP Location: Left arm   Patient Position: Sitting   BP Method: Large (Automatic)   Pulse: 70   Weight: 121.6 kg (268 lb 1.3 oz)   Height: 5' 8" (1.727 m)     Body mass index is 40.76 kg/m².      Physical Exam:  Constitutional: The patient appears well-developed and well-nourished. No distress.   Head: Normocephalic and atraumatic.   Nose: Nose normal.   Eyes: Conjunctivae and EOM are normal.   Neck: No tracheal deviation present.   Cardiovascular: Normal rate and intact distal pulses.    Pulmonary/Chest: " Effort normal. No respiratory distress.   Abdominal: There is no guarding.   Neurological: The patient is alert.   Psychiatric: The patient has a normal mood and affect.     Right Hand/Wrist Examination:    Observation/Inspection:  Swelling  none    Deformity  none  Discoloration  Dark discoloration around site of fissuring in 1st web space  Scars   none    Atrophy  None    HAND/WRIST EXAMINATION:  Finkelstein's Test   Neg  Snuff box tenderness   Neg  Fay's Test    Neg  Hook of Hamate Tenderness  Neg  CMC grind    Neg  Circumduction test   Neg    Neurovascular Exam:  Digits WWP, brisk CR < 3s throughout  NVI motor/LTS to M/R/U nerves, radial pulse 2+  Decreased sensation right thumb and long fingers  Tinel's Test - Carpal Tunnel  Neg  Tinel's Test - Cubital Tunnel  Neg  Phalen's Test    Neg  Median Nerve Compression Test Neg    ROM hand/wrist/elbow full, painless    Diagnostic Results:     US right hand - non-specific fluid collection associated with right long finger MCPJ  EMG - mild-mod R CTS, mild L CTS    ASSESSMENT/PLAN:      58 y.o. yo female with b/l CTS  1) Bilateral carpal tunnel braces  2) F/u 6 weeks with repeat hand x-rays at that time        Jaden Yoo M.D.

## 2018-11-12 NOTE — PROGRESS NOTES
[facility] - NEUROLOGY  [facility] [place]    Date: November 12, 2018   Patient Name: Sisi Medel   MRN: [id number]   PCP: Carlos A Caputo  Referring Provider: No ref. provider found    Assessment:      This is Sisi Medel, 58 y.o. female with complex medical problems including AF on coumadin, pulmonary sarcoid, ESRD on HD with orthostasis and recurrent syncope around dialysis who presents for follow up for her epilepsy.  She has symptoms of painful polyneuropathy noted on EMG 2014, recent A1C elevated most likely history of glucose intolerance from long term steroid use verus sarcoid versus renal disease, strength is intact.     Plan:      -  Stop GBP and start PBG 100mg bid  -  Compound cream  -  Continue LEV     Follow up 3 months     2014 EPILEPSY QUALITY MEASUREMENT (AAN)  1 a. Seizure Frequency: as noted  1b. Seizure Intervention: as noted  2.  a. Etiology: as noted  b. Seizure Type: as noted  c. Epilepsy Syndrome: n/a  3.  a. Side-effects of AED: as noted   b. Intervention for side-effects: as noted  4. Screening for psychiatric or behavioral disorders: as noted  5. Personalized epilepsy safety issues and education: yes  6. Counseling for women of child-bearing potential and epilepsy: yes   7. Referral of treatment-resistant epilepsy to comprehensive epilepsy center (q 2 years): N/A.    The patient was asked to call me/the clinic 1 week after the test(s) are done to obtain results.    The following issues were  all discussed in detail with the patient and family/caregiver(s):    1. Diagnosis, plans, prognosis, medications and possible side-effects, risks and benefits of treatment, other alternatives to AEDs.  2. Risks related to continued seizures, status epilepticus, SUDEP, driving restrictions and seizure precautions ( no baths but showers are ok, no swimming unsupervised, no use of heavy machinery, no use of sharp moving objects, avoid heights).   3.  Issues related to pregnancy, OCP and breast feeding as it relates to epilepsy.  4. The potential of teratogenicity and suicidal risks of anti-epileptic medications.  5.Avoid any activity that compromise patient safety related to seizures.     Questions and concerns raised by the patient and family/care-giver(s) were addressed and they indicated understanding of everything discussed and agreed to plans as above.     I discussed side effects of the medications. I asked the patient to stop the medication if she notices serious adverse effects as we discussed and to seek immediate medical attention at an ER.     Emeka Lester MD  [facility]   Department of Neurology    Subjective:   No benefit of pamelor and noted discoloration of feet, continues GBP 600mg bid, prior benefit of PGB    8/2018  No improvement with increased GBP    5/2018  Seizures remain controlled, main concern is neuropathic pain in feet  Developed painful wounds on feet 4/2018 and was told to stop doxepin cream with some healing since - currently reports questionable benefit from doxepin to begin with  Never started pamelor and currently taking GBP 300mg bid    12/2017  Improvement with compound cream, feels she is more mobile but notes it dries skin; did not start pamelor; planning to visit son in [place] for [place]    HPI 9/2017:   Ms. Sisi Medel is a 58 y.o. female who presents for follow up for epilepsy  Her seizures remain controlled and her main concern today is painful paresthesias in distal low extremities which started on right where she had shingles.  No relief from GBP, lidocaine/capsaicin cream.    PAST MEDICAL HISTORY:  Past Medical History:   Diagnosis Date    Anemia in ESRD (end-stage renal disease) 3/7/2016    Anticoagulant long-term use     Cervical radiculopathy 7/20/2015    CHF (congestive heart failure)     Chronic combined systolic and diastolic heart failure 6/14/2013    EF 20% 2013, improved with  Medical therapy, negative PET     Chronic respiratory failure with hypoxia 2013    On home oxygen at 2-5 liters    Closed fracture of proximal end of right fibula 2016    Complications due to renal dialysis device, implant, and graft     DDD (degenerative disc disease), lumbar 2015    Dependence on renal dialysis     Diabetes mellitus type II, controlled 2017    ESRD on hemodialysis 2016    Essential hypertension     Gout     Lateral meniscal tear 2016    Lumbar stenosis 2015    Obesity, Class III, BMI 40-49.9 (morbid obesity)     Pacemaker     Paroxysmal atrial fibrillation 2014    Not on anticoagulation    Peripheral neuropathy 2013    Personal history of gastric ulcer 3/19/2013    Sarcoidosis, lung 2009    Diagnosed in  with ocular manifestation, on 4L home O2 and PO steroids    Secondary pulmonary hypertension 2015    Seizure disorder 3/19/2013    Shingles 2013    Thyroid disease        PAST SURGICAL HISTORY:  Past Surgical History:   Procedure Laterality Date    ABDOMINAL SURGERY      ABLATION N/A 2017    Performed by Bl[facility]imir [facility] at [facility] CATH LAB    ANGIOPLASTY Left 1/10/2018    Performed by Torrey [facility]af[facility] at [facility] OR 2ND FLR    ANGIOPLASTY-PERCUTANEOUS TRANSLUMINAL (PTA) Left 2017    Performed by [facility] at [facility] OR 2ND FLR    ANGIOPLASTY-PERCUTANEOUS TRANSLUMINAL (PTA) Left 10/12/2016    Performed by Tor[facility] Vilalfana [facility] at [facility] OR 2ND FLR    CARDIAC PACEMAKER PLACEMENT       SECTION      x2    DECLOT GRAFT PERCUTANEOUS Left 2017    Performed by [facility] MD at [facility] OR 2ND FLR    DECLOT-GRAFT Left 2016    Performed by Harjit Starr MD at [facility] CATH LAB    DECLOT-GRAFT Left 2016    Performed by [facility]arr [facility] at [facility] CATH LAB    ECHOCARDIOGRAM-TRANSESOPHAGEAL N/A 2013    Performed by Delmy Surgeon at [facility] DELMY    fistulogram  Left 1/10/2018    [facility] OR 2ND FLR    FISTULOGRAM Left 9/13/2017    [facility] by [facility] at [facility] CATH LAB    FISTULOGRAM Left 10/12/2016    [facility] at [facility] OR 2ND FLR    FISTULOGRAM Left 6/21/2016    Performed by [facility]it Starr [facility] at [facility] CATH LAB    INJECTION-STEROID-EPIDURAL-TRANSFORAMINAL Right 7/20/2015    [facility] PAIN MGT    INJECTION-STEROID-EPIDURAL-TRANSFORAMINAL Right 5/21/2015    [facility] at [facility] MGT    DNIDBRGUJ-SWJEL-XMMFQECBGYGLC / Left upper AV graft. Left 2/3/2016    Performed by [facility] at [facility] OR 2ND FLR    ECVJVPFML-LNOUXCAHU-TTOVBPRXUTXMH N/A 1/11/2017    Performed by Bl[facility]imir [facility]orno MD at [facility] CATH LAB    OTHER SURGICAL HISTORY      loop recorder implant    PLACEMENT-STENT Left 2/7/2017    Performed by [facility] at [facility] OR 2ND FLR    stent to fistula      TRANSESOPHAGEAL ECHOCARDIOGRAM (JARAD) N/A 6/27/2016    Performed by Sourav Machuca MD at [facility] CATH LAB    ULTRASOUND, UPPER GI TRACT, ENDOSCOPIC N/A 1/9/2014    Performed by Stewart Burgess MD at [facility] ENDO (2ND FLR)    VASCULAR SURGERY      fistula to L upper arm        CURRENT MEDS:  Current Outpatient Medications   Medication Sig Dispense Refill    ACZONE 5 % topical gel Apply to face every morning 60 g 2    atorvastatin (LIPITOR) 80 MG tablet TAKE 1 TABLET(80 MG) BY MOUTH EVERY NIGHT 90 tablet 3    betamethasone dipropionate (DIPROLENE) 0.05 % ointment AAA bid hand under occlusion 45 g 3    blood sugar diagnostic Strp 1 strip by Misc.(Non-Drug; Combo Route) route 2 (two) times daily with meals. 100 strip 3    blood-glucose meter kit Use as instructed 1 each 0    BOOSTRIX TDAP 2.5-8-5 Lf-mcg-Lf/0.5mL Syrg injection ADM 0.5ML IM UTD  0    cholecalciferol, vitamin D3, 1,000 unit capsule Take 1 capsule (1,000 Units total) by mouth once daily. (Patient  taking differently: Take 1,000 Units by mouth every evening. )  0    clopidogrel (PLAVIX) 75 mg tablet Take 1 tablet (75 mg total) by mouth once daily. 30 tablet 11    diclofenac sodium (SOLARAZE) 3 % gel       fludrocortisone (FLORINEF) 0.1 mg Tab Take 100 mcg by mouth 2 (two) times daily.      ketorolac 0.5% (ACULAR) 0.5 % Drop instill one drop into both eyes every morning  3    lancets Misc 1 lancet by Misc.(Non-Drug; Combo Route) route 2 (two) times daily with meals. 100 each 3    lancing device Misc 1 Device by Misc.(Non-Drug; Combo Route) route 2 (two) times daily with meals. 1 each 0    levETIRAcetam (KEPPRA) 500 MG Tab Take 1 tablet (500 mg total) by mouth 2 (two) times daily. 180 tablet 3    linagliptin (TRADJENTA) 5 mg Tab tablet Take 1 tablet (5 mg total) by mouth once daily. 90 tablet 1    midodrine (PROAMATINE) 5 MG Tab       mupirocin (BACTROBAN) 2 % ointment Apply topically once daily. To areas of infection/inflammation on leg 30 g 0    omeprazole (PRILOSEC) 20 MG capsule TAKE 1 CAPSULE BY MOUTH EVERY DAY 90 capsule 4    polyethylene glycol (GLYCOLAX) 17 gram/dose powder Take 17 g by mouth once daily. Dissolve in juice. 510 g 0    prednisoLONE acetate (PRED FORTE) 1 % DrpS INSTILL ONE DROP INTO  LEFT EYE THREE TIMES A DAY  3    predniSONE (DELTASONE) 20 MG tablet Take 1 tablet (20 mg total) by mouth once daily. 30 tablet 0    PREVNAR 13, PF, 0.5 mL Syrg ADM 0.5ML IM UTD  0    DENISE-LINDA 0.8 mg Tab TK 1 T PO QD  0    RENVELA 800 mg Tab TAKE 1 TABLET BY MOUTH THREE TIMES DAILY WITH MEALS 90 tablet 0    sodium polystyrene (KAYEXALATE) 15 gram/60 mL Susp Take 60 mLs (15 g total) by mouth daily as needed. Take when directed by your doctor 60 mL 11    tiZANidine (ZANAFLEX) 4 MG tablet TAKE 1 TABLET BY MOUTH EVERY DAY AS NEEDED FOR MUSCLE SPASMS 90 tablet 0    warfarin (COUMADIN) 5 MG tablet Take 1 tablet (5 mg total) by mouth Daily. Per [facility] (Patient taking differently: Take 5  "mg by mouth every evening. Per [facility]) 90 tablet 3    pregabalin (LYRICA) 100 MG capsule Take 1 capsule (100 mg total) by mouth 2 (two) times daily. 180 capsule 1     No current facility-administered medications for this visit.        ALLERGIES:  Review of patient's allergies indicates:   Allergen Reactions    Bactrim [sulfamethoxazole-trimethoprim] Other (See Comments)     Causes renal failure    Nsaids (non-steroidal anti-inflammatory drug) Other (See Comments)     Renal failure       FAMILY HISTORY:  Family History   Problem Relation Age of Onset    Kidney failure Mother     Hypertension Mother     Diabetes Mother     Coronary artery disease Father     Hypertension Father     Diabetes Father     Lupus Sister     Diabetes Maternal Grandmother     Colon cancer Neg Hx     Ovarian cancer Neg Hx     Breast cancer Neg Hx        SOCIAL HISTORY:  Social History     Tobacco Use    Smoking status: Former Smoker     Packs/day: 0.50     Years: 10.00     Pack years: 5.00     Types: Cigarettes     Last attempt to quit: 1994     Years since quittin.5    Smokeless tobacco: Never Used   Substance Use Topics    Alcohol use: No     Alcohol/week: 0.0 oz     Comment: rarely    Drug use: No       Review of Systems:  12 review of systems is negative except for the symptoms mentioned in HPI.        Objective:     Vitals:    18 0747   BP: 110/75   Pulse: 70   Weight: 121.6 kg (268 lb 1.3 oz)   Height: 5' 8" (1.727 m)       General: NAD, well nourished   Eyes: no tearing, discharge, no erythema   ENT: moist mucous membranes of the oral cavity, nares patent    Neck: Supple, full range of motion  Cardiovascular: Warm and well perfused, pulses equal and symmetrical  Lungs: on O2 with increased work of breathing  Psychiatry: Mood and affect are appropriate   Abdomen: soft, non tender, non distended  Extremeties: right foot bandaged, small healing sores in bilateral thumb-index crease.    Neurological "   MENTAL STATUS: Alert and oriented to person, place, and time. Attention and concentration within normal limits. Speech without dysarthria, able to name and repeat without difficulty. Recent and remote memory within normal limits   CRANIAL NERVES: Visual fields intact. PERRL. EOMI. Facial sensation intact. Face symmetrical. Hearing grossly intact. Full shoulder shrug bilaterally. Tongue protrudes midline   SENSORY: Sensation is decreased to light touch in the feet.  Decreased vibration past the ankle  MOTOR: Normal bulk and tone. No pronator drift.  5/5 deltoid, biceps, triceps, interosseous, hand  bilaterally. 5/5 iliopsoas, knee extension/flexion, foot dorsi/plantarflexion bilaterally.    CEREBELLAR/COORDINATION/GAIT: Heel to shin intact. Finger to nose intact. Normal rapid alternating movements.  Able to stand with broad base, presents with walker

## 2018-11-16 NOTE — PROGRESS NOTES
Patient called 11/16/18 to inform us that she had labs on 11/12/18. She was upset saying she will call us back on when she can go again.

## 2018-11-26 NOTE — PROGRESS NOTES
Patient called 11/26/18 to cat appt 11/14/18 to 11/28/18. Patient said she came and had labs done on 11/14/18. She said what happen to her results she have no idea.

## 2018-11-28 NOTE — PROGRESS NOTES
Quick follow up for subtherapeutic INR on 11/7. INR within therapeutic range today. Bruising noted to arms from use. Patient states that she had some greens this week as well. She denies any bleeding or other changes that would affect warfarin therapy. Will maintain current dose and follow up in 3 weeks. Patient advised to call coumadin clinic with any changes or concerns. Care plan made with BARRY DENTON

## 2018-12-05 NOTE — TELEPHONE ENCOUNTER
----- Message from Ricarda Chauhan sent at 12/5/2018 12:15 PM CST -----  Contact:         Patient   [phone]  Needs Advice    Reason for call:  Checking the status of documents being sent for the pt to received portable oxygen  ,  Give the pt  A call back to discuss         Communication Preference:   Phone     Additional Information:

## 2018-12-05 NOTE — TELEPHONE ENCOUNTER
Called and spoke to Ms. Medel letting her know that we was waiting to speak with [facility] about her oxygen. While speaking with the patient the she also mentioned that she would like for me to let Dr. Altamirano know that she was out of her Prednisone and needed to have a refill sent in to her pharmacy. Informed Ms. Medel that I would be glad to put in a medication request for her with.

## 2018-12-07 NOTE — TELEPHONE ENCOUNTER
Rx printed, please fax signed rx to [facility] and let patient know when this has been sent    (Patient is requesting a Rx for meter and supplies to be faxed to [facility]. Please print, sign and fax Rx to [facility] at [phone]. Thanks (Routing comment))

## 2018-12-10 NOTE — TELEPHONE ENCOUNTER
Spoke with Ms. Medel letting her know that I received her message in regards to her Prednisone refill and that I will be happy to forward her message to Dr. Altamirano for this matter.

## 2018-12-10 NOTE — TELEPHONE ENCOUNTER
----- Message from Ricarda Chauhan sent at 12/10/2018 11:04 AM CST -----  Contact:   Patient     Rx Refill/Request     Is this a Refill or New Rx:    Refill     Rx Name and Strength:    prednisoLONE  Preferred Pharmacy with phone number:    [facility]    Communication Preference:      Phone  [phone]  Additional Information:

## 2018-12-10 NOTE — TELEPHONE ENCOUNTER
----- Message from Ricarda Cahuhan sent at 12/10/2018 11:04 AM CST -----  Contact:   Patient     Rx Refill/Request     Is this a Refill or New Rx:    Refill     Rx Name and Strength:    prednisoLONE  Preferred Pharmacy with phone number:    [facility]    Communication Preference:      Phone  [phone]  Additional Information:

## 2018-12-19 NOTE — PROGRESS NOTES
INR within therapeutic range today. Patient reports occasional bruising from use. Patient denies any bleeding or other changes that would affect warfarin therapy. Will maintain current dose and follow up in 4 weeks. Patient advised to call coumadin clinic with any changes or concerns. Care plan made with BARRY DENTON

## 2018-12-31 NOTE — ED NOTES
Patient provided D/C instructions at the bedside. Patient does not have additional O2 tank on her person; stating EMS said not to bring it because they have them on board.  Patient state she will be alright until she gets home.  Patient's friend en route to come and pick her up.  Charge RN made aware and patient was able to tolerate >94% room air while await for friend's arrival.     Patient was provided the opportunity to review D/C summary and diagnosis.  Patient has no further questions or concerns in regards to treatment/care they have received today in the emergency department.  Patient acknowledged discharge teachings and follow-up appointment as directed.  Patient had wheel-chair escort while exiting the emergency department.

## 2018-12-31 NOTE — ED NOTES
Patient information was obtained from patient and EMS personnel. Sisi Medel  History/Exam limitations: none.  Patient presented to the Emergency Department with complaints of by ambulance where the patient received oxygen prior to arrival.    Patient presents with complaints of Right Ankle pain.  Patient has had chronic issues with PVD and RIGHT ankle wound that's been healing since October 2018.  Patient is on HD with fistula on the left upper arm.  Patient denies any chest pain, SOB or N/V/D.  Patient is on O2 at home, between 3-5L NC.       · Patient Identifiers for Sisi Medel checked and correct  · LOC: The patient is awake, alert and aware of environment with an appropriate affect.  Alert to person, place, time and situation.    · APPEARANCE: Patient resting comfortably with no acute distress noted, patient is clean and well groomed, patient's clothing is properly fastened.  · SKIN: The skin is warm and dry, patient has normal skin turgor and moist mucus membranes,no rashes or lesions.  Skin is Intact , No Breakdown Noted  · Musculoskeletal:  RIGHT ankle pain with bilateral lower extremity swelling and tenderness.    · RESPIRATORY: Airway is open and patent, respirations are spontaneous, patient has a normal effort, rate & depth with symmetrical expansion.  Bilateral Lungs Sounds are clear.  Patient remains on 3L NC chronically.   · CARDIAC: Patient has a normal rate and rhythm, no periphreal edema noted, capillary refill < 3 seconds.   · ABDOMEN: Soft and non tender with palpation.  No obvious distention noted. Bowels Sounds are active & present in all four quadrants.   · PULSES: 2+  And symmetrical in all extremeties  · NEUROLOGIC: Pupils PERRL & Reactive to light.  Facial expression is symmetrical, patient moving all extremities, normal sensation in all extremities when touched with a finger. The patient is awake, alert with calm affect, patient is aware of environment.    · PSYCHOLOGICAL:  Calm, cooperative and compliant.        Will continue to monitor patient for any acute changes.

## 2018-12-31 NOTE — ED PROVIDER NOTES
Encounter Date: 12/30/2018       History     Chief Complaint   Patient presents with    Ankle Pain     Bilateral ankle pain. Poor perfusion.Loss of sensation in right foot which is chronic, but may be worsening. Both legs have become darker recently. Decreased pulses in both extremities,     HPI   59 yo F with a PMH of  CHF, ESRD on HD (T,TH,SALEH), Pulm sarcoidosis on home O2 (5L NC), Afib on coumadin, PVD, DM, Neuropathy who presents with a 3 weeks history of bilateral lower extremity discoloration and peeling with worsening chronic pain for the past 2 weeks. Patient states she took tylenol today and some relief of her symptoms. Denies recent injuries to her lower extremities. She is also endorsing associated nausea, worsening chronic SOB for the past 2 months, and worsening chronic lightheadedness for the past several months.     Review of patient's allergies indicates:   Allergen Reactions    Bactrim [sulfamethoxazole-trimethoprim] Other (See Comments)     Causes renal failure    Nsaids (non-steroidal anti-inflammatory drug) Other (See Comments)     Renal failure     Past Medical History:   Diagnosis Date    Anemia in ESRD (end-stage renal disease) 3/7/2016    Anticoagulant long-term use     Cervical radiculopathy 7/20/2015    CHF (congestive heart failure)     Chronic combined systolic and diastolic heart failure 6/14/2013    EF 20% 2013, improved with Medical therapy, negative PET 2013    Chronic respiratory failure with hypoxia 04/22/2013    On home oxygen at 2-5 liters    Closed fracture of proximal end of right fibula 6/27/2016    Complications due to renal dialysis device, implant, and graft     DDD (degenerative disc disease), lumbar 6/17/2015    Dependence on renal dialysis     Diabetes mellitus type II, controlled 12/8/2017    ESRD on hemodialysis 2/23/2016    Essential hypertension     Gout     Lateral meniscal tear 5/31/2016    Lumbar stenosis 6/17/2015    Obesity, Class III, BMI  40-49.9 (morbid obesity)     Pacemaker     Paroxysmal atrial fibrillation 2014    Not on anticoagulation    Peripheral neuropathy 2013    Personal history of gastric ulcer 3/19/2013    Sarcoidosis, lung 2009    Diagnosed in  with ocular manifestation, on 4L home O2 and PO steroids    Secondary pulmonary hypertension 2015    Seizure disorder 3/19/2013    Shingles 2013    Thyroid disease      Past Surgical History:   Procedure Laterality Date    ABDOMINAL SURGERY      ABLATION N/A 2017    [facility] CATH [facility]    ANGIOPLASTY Left 1/10/2018    [facility]    ANGIOPLASTY-PERCUTANEOUS TRANSLUMINAL (PTA) Left 2017    [facility]    ANGIOPLASTY-PERCUTANEOUS TRANSLUMINAL (PTA) Left 10/12/2016    [facility]    CARDIAC PACEMAKER PLACEMENT       SECTION      x2    DECLOT GRAFT PERCUTANEOUS Left 2017    [facility]    DECLOT-GRAFT Left 2016    [facility]it Starr [facility] at [facility] CATH LAB    DECLOT-GRAFT Left 2016    [facility] CATH [facility]    ECHOCARDIOGRAM-TRANSESOPHAGEAL N/A 2013    [facility] by Delmy Surgeon at [facility]    fistulogram Left 1/10/2018    [facility]    FISTULOGRAM Left 2017    [facility] CATH [facility]    FISTULOGRAM Left 10/12/2016    [facility]    FISTULOGRAM Left 2016    [facility] CATH [facility]    INJECTION-STEROID-EPIDURAL-TRANSFORAMINAL Right 2015    [facility]    INJECTION-STEROID-EPIDURAL-TRANSFORAMINAL Right 2015    [facility]     [id number] / Left upper AV graft. Left 2/3/2016    Performed by [facility] at [facility] OR [facility]    HOGLMXJTH-BHNSHNOTA-[id number] N/A 2017    Per[facility]orno [facility] at [facility] CATH LAB    OTHER SURGICAL HISTORY      loop recorder implant    PLACEMENT-STENT Left 2017    [facility]    stent to fistula      TRANSESOPHAGEAL ECHOCARDIOGRAM (JARAD) N/A 2016    Performed by Sourav Machuca MD at [facility] CATH LAB    ULTRASOUND, UPPER GI TRACT, ENDOSCOPIC N/A 2014    Performed by Stewart Burgess MD at [facility] ENDO (2ND [facility])    VASCULAR SURGERY      fistula to L upper arm      Family History   Problem Relation Age of Onset    Kidney failure Mother     Hypertension Mother     Diabetes Mother     Coronary artery disease Father     Hypertension Father     Diabetes Father     Lupus Sister     Diabetes Maternal Grandmother     Colon cancer Neg Hx     Ovarian cancer Neg Hx     Breast cancer Neg Hx      Social History     Tobacco Use    Smoking status: Former Smoker     Packs/day: 0.50     Years: 10.00     Pack years: 5.00     Types: Cigarettes     Last attempt to quit: 1994     Years since quittin.7    Smokeless tobacco: Never Used   Substance Use Topics    Alcohol use: No     Alcohol/week: 0.0 oz     Comment: rarely    Drug use: No     Review of Systems   Constitutional: Negative for chills and fever.   HENT: Negative for congestion and drooling.    Eyes: Negative for visual disturbance.   Respiratory: Positive for shortness of breath. Negative for cough and wheezing.    Cardiovascular: Negative for chest pain and leg swelling.   Gastrointestinal: Positive for nausea. Negative for abdominal pain, constipation, diarrhea and vomiting.   Genitourinary: Negative for dysuria.   Musculoskeletal: Negative for back pain and neck pain.   Skin: Negative for pallor, rash and wound.   Neurological: Positive for  light-headedness. Negative for dizziness, syncope, weakness, numbness and headaches.   Psychiatric/Behavioral: Negative for confusion.       Physical Exam     Initial Vitals [12/30/18 2124]   BP Pulse Resp Temp SpO2   (!) 142/75 84 18 98.2 °F (36.8 °C) 98 %      MAP       --         Physical Exam    Nursing note and vitals reviewed.  Constitutional: She is not diaphoretic. No distress.   HENT:   Head: Normocephalic and atraumatic.   Eyes: EOM are normal. Pupils are equal, round, and reactive to light. No scleral icterus.   Neck: Normal range of motion. Neck supple. No JVD present.   Cardiovascular: Normal rate, regular rhythm, normal heart sounds and intact distal pulses. Exam reveals no gallop and no friction rub.    No murmur heard.  Pulmonary/Chest: Breath sounds normal. No respiratory distress. She has no wheezes. She has no rhonchi. She has no rales. She exhibits no tenderness.   Abdominal: Soft. Bowel sounds are normal. She exhibits no distension and no mass. There is no tenderness. There is no rebound and no guarding.   Musculoskeletal: Normal range of motion. She exhibits tenderness (over the bilateral anterior aspect of the legs). She exhibits no edema.   AVF to the LUE with good bruit and thrill. Black discoloration of the anterior tibias with an old ulceration with scarring over the anterior medial aspect of the LLE. There also remote ulceration to the left and right great toe. Skin peeling over bilateral LE and feet.    Neurological: She is alert and oriented to person, place, and time. She has normal strength. A sensory deficit (Decreased sensation over the L5 distribution of the left foot ) is present.   Skin: Skin is warm. No rash and no abscess noted. No erythema. No pallor.         HO-III MDM  This is an emergent evaluation of a 59 yo F with a PMH of  CHF, ESRD on HD (T,TH,SU), Pulm sarcoidosis on home O2 (5L NC), Afib on coumadin, PVD, DM, Neuropathy who presents with a 3 weeks history of bilateral  lower extremity discoloration and peeling with worsening chronic pain for the past 2 weeks. AF, pulse 84 bpm, spO2 98%, /75. Physical exam notable for a non-toxic appearing female in no acute distress. Tenderness over the bilateral anterior aspect of the legs. Compartments soft. AVF to the LUE with good bruit and thrill. Black discoloration of the anterior tibias with an old ulceration with scarring over the anterior medial aspect of the LLE. There also remote ulceration to the left and right great toe. Skin peeling over bilateral LE and feet. Decreased sensation over the L5 distribution of the left foot. CN 2-12 and strength grossly intact. DDx includes but is not limited to worsening PVD vs worsening Neuropathy, electrolyte abnormalities, ACS, worsening pulmonary sarcoidosis. Will obtain labs and imaging including CBC, CMP, troponin, EKG, CXR, arterial US of bilateral LE. Will also provide Gabapentin PO and pain medication as need. Will continue to monitor and frequently reassess pending results of labs, treatments and final disposition. Case discussed with Dr. Quick.     Ivette Douglass D.O  [facility] PGY-3  9:25 PM 12/30/2018       HO-III UPDATE  , which is stable from August 2018. BMP shows glu 122, BUN of 53, Cr of 7.8, which is consistent with her ESRD. Troponin 0.164 which is stable from January 2018. CBC unremarkable. CXR shows stable cardiomegaly, some stable central vascular congestion and diffuse coarse interstitial bibasilar markings with small bilateral pleural effusions. Arterial US of the LE shows normal ABIs with some distal vascular disease. Given symptoms and work-up, planned to admit the patient to medicine. Discussed patient with hospital medicine, Dr. Ordonez, who reviewed the patient's chart and states treatment for patient would be dialysis and she is scheduled for dialysis today. He feels she will receive her needed treatment sooner as an outpatient and does  not need hospital admission at this time. Patient states she is scheduled for dialysis at 4:30 AM this morning. Will discharge to dialysis with ED return precautions and close PCP follow-up.     Ivette Douglass D.O  [facility] EMERGENCY MEDICINE PGY-3  3:32 AM 12/31/2018      ED Course   Procedures  Labs Reviewed - No data to display       Imaging Results    None                               Clinical Impression:   The primary encounter diagnosis was Congestive heart failure, unspecified HF chronicity, unspecified heart failure type. Diagnoses of Shortness of breath and PVD (peripheral vascular disease) were also pertinent to this visit.                             Ivette Douglass, DO  Resident  12/31/18 0347       Ivette Douglass, DO  Resident  12/31/18 0353

## 2019-01-01 ENCOUNTER — HOSPITAL ENCOUNTER (OUTPATIENT)
Facility: HOSPITAL | Age: 59
Discharge: HOME | End: 2019-07-24
Attending: SURGERY | Admitting: SURGERY
Payer: MEDICARE

## 2019-01-01 ENCOUNTER — ANTI-COAG VISIT (OUTPATIENT)
Dept: CARDIOLOGY | Facility: CLINIC | Age: 59
End: 2019-01-01
Payer: MEDICARE

## 2019-01-01 ENCOUNTER — TELEPHONE (OUTPATIENT)
Dept: TRANSPLANT | Facility: CLINIC | Age: 59
End: 2019-01-01

## 2019-01-01 ENCOUNTER — OFFICE VISIT (OUTPATIENT)
Dept: DERMATOLOGY | Facility: CLINIC | Age: 59
End: 2019-01-01
Payer: MEDICARE

## 2019-01-01 ENCOUNTER — DOCUMENTATION ONLY (OUTPATIENT)
Dept: HEPATOLOGY | Facility: HOSPITAL | Age: 59
End: 2019-01-01

## 2019-01-01 ENCOUNTER — TELEPHONE (OUTPATIENT)
Dept: ADMINISTRATIVE | Facility: CLINIC | Age: 59
End: 2019-01-01

## 2019-01-01 ENCOUNTER — HOSPITAL ENCOUNTER (OUTPATIENT)
Dept: VASCULAR SURGERY | Facility: CLINIC | Age: 59
Discharge: HOME | End: 2019-07-22
Payer: MEDICARE

## 2019-01-01 ENCOUNTER — TELEPHONE (OUTPATIENT)
Dept: VASCULAR SURGERY | Facility: CLINIC | Age: 59
End: 2019-01-01

## 2019-01-01 ENCOUNTER — TELEPHONE (OUTPATIENT)
Dept: CARDIOLOGY | Facility: HOSPITAL | Age: 59
End: 2019-01-01

## 2019-01-01 ENCOUNTER — HOSPITAL ENCOUNTER (OUTPATIENT)
Facility: HOSPITAL | Age: 59
Discharge: HOME HEALTH | End: 2019-05-11
Attending: EMERGENCY MEDICINE | Admitting: INTERNAL MEDICINE
Payer: MEDICARE

## 2019-01-01 ENCOUNTER — OFFICE VISIT (OUTPATIENT)
Dept: PODIATRY | Facility: CLINIC | Age: 59
End: 2019-01-01
Payer: MEDICARE

## 2019-01-01 ENCOUNTER — PATIENT OUTREACH (OUTPATIENT)
Dept: ADMINISTRATIVE | Facility: CLINIC | Age: 59
End: 2019-01-01

## 2019-01-01 ENCOUNTER — OFFICE VISIT (OUTPATIENT)
Dept: NEUROLOGY | Facility: CLINIC | Age: 59
End: 2019-01-01
Payer: MEDICARE

## 2019-01-01 ENCOUNTER — OFFICE VISIT (OUTPATIENT)
Dept: INTERNAL MEDICINE | Facility: CLINIC | Age: 59
End: 2019-01-01
Payer: MEDICARE

## 2019-01-01 ENCOUNTER — HOSPITAL ENCOUNTER (OUTPATIENT)
Dept: VASCULAR SURGERY | Facility: CLINIC | Age: 59
Discharge: HOME | DRG: 312 | End: 2019-03-27
Attending: SURGERY
Payer: MEDICARE

## 2019-01-01 ENCOUNTER — OFFICE VISIT (OUTPATIENT)
Dept: WOUND CARE | Facility: CLINIC | Age: 59
End: 2019-01-01
Payer: MEDICARE

## 2019-01-01 ENCOUNTER — HOSPITAL ENCOUNTER (OUTPATIENT)
Dept: PULMONOLOGY | Facility: CLINIC | Age: 59
Discharge: HOME | End: 2019-05-15
Payer: MEDICARE

## 2019-01-01 ENCOUNTER — HOSPITAL ENCOUNTER (INPATIENT)
Facility: HOSPITAL | Age: 59
LOS: 4 days | Discharge: HOME HEALTH | DRG: 312 | End: 2019-04-01
Attending: EMERGENCY MEDICINE | Admitting: INTERNAL MEDICINE
Payer: MEDICARE

## 2019-01-01 ENCOUNTER — LAB VISIT (OUTPATIENT)
Dept: LAB | Facility: HOSPITAL | Age: 59
End: 2019-01-01
Payer: MEDICARE

## 2019-01-01 ENCOUNTER — TELEPHONE (OUTPATIENT)
Dept: DERMATOLOGY | Facility: CLINIC | Age: 59
End: 2019-01-01

## 2019-01-01 ENCOUNTER — CLINICAL SUPPORT (OUTPATIENT)
Dept: CARDIOLOGY | Facility: HOSPITAL | Age: 59
DRG: 085 | End: 2019-01-01
Attending: INTERNAL MEDICINE
Payer: MEDICARE

## 2019-01-01 ENCOUNTER — TELEPHONE (OUTPATIENT)
Dept: WOUND CARE | Facility: CLINIC | Age: 59
End: 2019-01-01

## 2019-01-01 ENCOUNTER — HOSPITAL ENCOUNTER (INPATIENT)
Facility: HOSPITAL | Age: 59
LOS: 20 days | DRG: 207 | End: 2019-08-23
Attending: EMERGENCY MEDICINE | Admitting: EMERGENCY MEDICINE
Payer: MEDICARE

## 2019-01-01 ENCOUNTER — OFFICE VISIT (OUTPATIENT)
Dept: WOUND CARE | Facility: CLINIC | Age: 59
DRG: 193 | End: 2019-01-01
Payer: MEDICARE

## 2019-01-01 ENCOUNTER — OFFICE VISIT (OUTPATIENT)
Dept: VASCULAR SURGERY | Facility: CLINIC | Age: 59
DRG: 312 | End: 2019-01-01
Attending: SURGERY
Payer: MEDICARE

## 2019-01-01 ENCOUNTER — ANTI-COAG VISIT (OUTPATIENT)
Dept: CARDIOLOGY | Facility: CLINIC | Age: 59
End: 2019-01-01

## 2019-01-01 ENCOUNTER — DOCUMENTATION ONLY (OUTPATIENT)
Dept: CARDIOLOGY | Facility: HOSPITAL | Age: 59
End: 2019-01-01

## 2019-01-01 ENCOUNTER — TELEPHONE (OUTPATIENT)
Dept: NEUROSURGERY | Facility: CLINIC | Age: 59
End: 2019-01-01

## 2019-01-01 ENCOUNTER — HOSPITAL ENCOUNTER (OUTPATIENT)
Facility: HOSPITAL | Age: 59
Discharge: HOME | End: 2019-01-30
Attending: SURGERY | Admitting: SURGERY
Payer: MEDICARE

## 2019-01-01 ENCOUNTER — HOSPITAL ENCOUNTER (OUTPATIENT)
Dept: RADIOLOGY | Facility: OTHER | Age: 59
Discharge: HOME | End: 2019-01-04
Attending: ORTHOPAEDIC SURGERY
Payer: MEDICARE

## 2019-01-01 ENCOUNTER — OFFICE VISIT (OUTPATIENT)
Dept: ORTHOPEDICS | Facility: CLINIC | Age: 59
End: 2019-01-01
Payer: MEDICARE

## 2019-01-01 ENCOUNTER — HOSPITAL ENCOUNTER (OUTPATIENT)
Facility: HOSPITAL | Age: 59
Discharge: HOME | End: 2019-05-08
Attending: SURGERY | Admitting: SURGERY
Payer: MEDICARE

## 2019-01-01 ENCOUNTER — PATIENT MESSAGE (OUTPATIENT)
Dept: VASCULAR SURGERY | Facility: CLINIC | Age: 59
End: 2019-01-01

## 2019-01-01 ENCOUNTER — HOSPITAL ENCOUNTER (OUTPATIENT)
Dept: VASCULAR SURGERY | Facility: CLINIC | Age: 59
Discharge: HOME | End: 2019-05-01
Attending: SURGERY
Payer: MEDICARE

## 2019-01-01 ENCOUNTER — TELEPHONE (OUTPATIENT)
Dept: ADMINISTRATIVE | Facility: OTHER | Age: 59
End: 2019-01-01

## 2019-01-01 ENCOUNTER — PATIENT MESSAGE (OUTPATIENT)
Dept: NEUROLOGY | Facility: CLINIC | Age: 59
End: 2019-01-01

## 2019-01-01 ENCOUNTER — DOCUMENTATION ONLY (OUTPATIENT)
Dept: INTERNAL MEDICINE | Facility: CLINIC | Age: 59
End: 2019-01-01

## 2019-01-01 ENCOUNTER — OFFICE VISIT (OUTPATIENT)
Dept: VASCULAR SURGERY | Facility: CLINIC | Age: 59
End: 2019-01-01
Attending: SURGERY
Payer: MEDICARE

## 2019-01-01 ENCOUNTER — TELEPHONE (OUTPATIENT)
Dept: PODIATRY | Facility: CLINIC | Age: 59
End: 2019-01-01

## 2019-01-01 ENCOUNTER — HOSPITAL ENCOUNTER (OUTPATIENT)
Dept: VASCULAR SURGERY | Facility: CLINIC | Age: 59
Discharge: HOME | End: 2019-01-04
Attending: SURGERY
Payer: MEDICARE

## 2019-01-01 ENCOUNTER — TELEPHONE (OUTPATIENT)
Dept: INTERNAL MEDICINE | Facility: CLINIC | Age: 59
End: 2019-01-01

## 2019-01-01 ENCOUNTER — OFFICE VISIT (OUTPATIENT)
Dept: ENDOCRINOLOGY | Facility: CLINIC | Age: 59
DRG: 189 | End: 2019-01-01
Attending: INTERNAL MEDICINE
Payer: MEDICARE

## 2019-01-01 ENCOUNTER — HOSPITAL ENCOUNTER (EMERGENCY)
Facility: HOSPITAL | Age: 59
Discharge: HOME | End: 2019-07-26
Attending: EMERGENCY MEDICINE
Payer: MEDICARE

## 2019-01-01 ENCOUNTER — TELEPHONE (OUTPATIENT)
Dept: NEUROLOGY | Facility: CLINIC | Age: 59
End: 2019-01-01

## 2019-01-01 ENCOUNTER — HOSPITAL ENCOUNTER (OUTPATIENT)
Facility: HOSPITAL | Age: 59
Discharge: HOME | End: 2019-04-05
Attending: SURGERY | Admitting: SURGERY
Payer: MEDICARE

## 2019-01-01 ENCOUNTER — HOSPITAL ENCOUNTER (EMERGENCY)
Facility: HOSPITAL | Age: 59
Discharge: HOME | End: 2019-02-01
Attending: EMERGENCY MEDICINE
Payer: MEDICARE

## 2019-01-01 ENCOUNTER — NURSE TRIAGE (OUTPATIENT)
Dept: ADMINISTRATIVE | Facility: CLINIC | Age: 59
End: 2019-01-01

## 2019-01-01 ENCOUNTER — HOSPITAL ENCOUNTER (INPATIENT)
Facility: HOSPITAL | Age: 59
LOS: 5 days | Discharge: HOME | DRG: 193 | End: 2019-03-15
Attending: EMERGENCY MEDICINE | Admitting: INTERNAL MEDICINE
Payer: MEDICARE

## 2019-01-01 ENCOUNTER — PATIENT OUTREACH (OUTPATIENT)
Dept: ADMINISTRATIVE | Facility: HOSPITAL | Age: 59
End: 2019-01-01

## 2019-01-01 ENCOUNTER — HOSPITAL ENCOUNTER (INPATIENT)
Facility: HOSPITAL | Age: 59
LOS: 21 days | Discharge: HOME HEALTH | DRG: 189 | End: 2019-07-01
Attending: INTERNAL MEDICINE | Admitting: INTERNAL MEDICINE
Payer: MEDICARE

## 2019-01-01 ENCOUNTER — TELEPHONE (OUTPATIENT)
Dept: HEPATOLOGY | Facility: CLINIC | Age: 59
End: 2019-01-01

## 2019-01-01 ENCOUNTER — OFFICE VISIT (OUTPATIENT)
Dept: TRANSPLANT | Facility: CLINIC | Age: 59
End: 2019-01-01
Payer: MEDICARE

## 2019-01-01 ENCOUNTER — ANESTHESIA EVENT (OUTPATIENT)
Dept: SURGERY | Facility: HOSPITAL | Age: 59
End: 2019-01-01
Payer: MEDICARE

## 2019-01-01 ENCOUNTER — ANESTHESIA (OUTPATIENT)
Dept: SURGERY | Facility: HOSPITAL | Age: 59
End: 2019-01-01
Payer: MEDICARE

## 2019-01-01 ENCOUNTER — TELEPHONE (OUTPATIENT)
Dept: ORTHOPEDICS | Facility: CLINIC | Age: 59
End: 2019-01-01

## 2019-01-01 ENCOUNTER — LAB VISIT (OUTPATIENT)
Dept: LAB | Facility: HOSPITAL | Age: 59
End: 2019-01-01
Attending: INTERNAL MEDICINE
Payer: MEDICARE

## 2019-01-01 ENCOUNTER — CLINICAL SUPPORT (OUTPATIENT)
Dept: CARDIOLOGY | Facility: HOSPITAL | Age: 59
End: 2019-01-01
Attending: INTERNAL MEDICINE
Payer: MEDICARE

## 2019-01-01 ENCOUNTER — OFFICE VISIT (OUTPATIENT)
Dept: PULMONOLOGY | Facility: CLINIC | Age: 59
End: 2019-01-01
Payer: MEDICARE

## 2019-01-01 ENCOUNTER — TELEPHONE (OUTPATIENT)
Dept: PULMONOLOGY | Facility: CLINIC | Age: 59
End: 2019-01-01

## 2019-01-01 ENCOUNTER — PATIENT MESSAGE (OUTPATIENT)
Dept: ADMINISTRATIVE | Facility: OTHER | Age: 59
End: 2019-01-01

## 2019-01-01 ENCOUNTER — HOSPITAL ENCOUNTER (EMERGENCY)
Facility: HOSPITAL | Age: 59
Discharge: HOME | End: 2019-04-10
Attending: EMERGENCY MEDICINE
Payer: MEDICARE

## 2019-01-01 ENCOUNTER — HOSPITAL ENCOUNTER (OUTPATIENT)
Facility: HOSPITAL | Age: 59
Discharge: HOME | End: 2019-04-08
Attending: SURGERY | Admitting: SURGERY
Payer: MEDICARE

## 2019-01-01 ENCOUNTER — HOSPITAL ENCOUNTER (INPATIENT)
Facility: HOSPITAL | Age: 59
LOS: 16 days | Discharge: NURSING, REHAB OR LONG-TERM CARE | DRG: 085 | End: 2019-07-19
Attending: EMERGENCY MEDICINE | Admitting: PSYCHIATRY & NEUROLOGY
Payer: MEDICARE

## 2019-01-01 ENCOUNTER — HOSPITAL ENCOUNTER (INPATIENT)
Facility: HOSPITAL | Age: 59
LOS: 11 days | Discharge: NURSING, REHAB OR LONG-TERM CARE | DRG: 871 | End: 2019-06-10
Attending: EMERGENCY MEDICINE | Admitting: HOSPITALIST
Payer: MEDICARE

## 2019-01-01 ENCOUNTER — TELEPHONE (OUTPATIENT)
Dept: ENDOCRINOLOGY | Facility: CLINIC | Age: 59
End: 2019-01-01

## 2019-01-01 ENCOUNTER — HOSPITAL ENCOUNTER (OUTPATIENT)
Dept: VASCULAR SURGERY | Facility: CLINIC | Age: 59
Discharge: HOME | End: 2019-02-20
Attending: SURGERY
Payer: MEDICARE

## 2019-01-01 ENCOUNTER — HOSPITAL ENCOUNTER (OUTPATIENT)
Dept: VASCULAR SURGERY | Facility: CLINIC | Age: 59
Discharge: HOME | End: 2019-04-24
Attending: SURGERY
Payer: MEDICARE

## 2019-01-01 VITALS
DIASTOLIC BLOOD PRESSURE: 52 MMHG | TEMPERATURE: 98 F | DIASTOLIC BLOOD PRESSURE: 60 MMHG | HEART RATE: 68 BPM | RESPIRATION RATE: 18 BRPM | TEMPERATURE: 96 F | HEIGHT: 68 IN | WEIGHT: 242.06 LBS | HEIGHT: 70 IN | BODY MASS INDEX: 37.43 KG/M2 | SYSTOLIC BLOOD PRESSURE: 104 MMHG | OXYGEN SATURATION: 98 % | BODY MASS INDEX: 36.69 KG/M2 | HEART RATE: 72 BPM | HEIGHT: 68 IN | WEIGHT: 246.94 LBS | OXYGEN SATURATION: 98 % | SYSTOLIC BLOOD PRESSURE: 108 MMHG | BODY MASS INDEX: 34.36 KG/M2 | RESPIRATION RATE: 20 BRPM | WEIGHT: 240 LBS

## 2019-01-01 VITALS
HEIGHT: 68 IN | SYSTOLIC BLOOD PRESSURE: 110 MMHG | WEIGHT: 240.31 LBS | BODY MASS INDEX: 36.42 KG/M2 | DIASTOLIC BLOOD PRESSURE: 70 MMHG | RESPIRATION RATE: 18 BRPM | OXYGEN SATURATION: 95 % | SYSTOLIC BLOOD PRESSURE: 102 MMHG | WEIGHT: 240 LBS | HEART RATE: 66 BPM | DIASTOLIC BLOOD PRESSURE: 70 MMHG | BODY MASS INDEX: 36.37 KG/M2 | HEIGHT: 68 IN | TEMPERATURE: 98 F | HEART RATE: 78 BPM

## 2019-01-01 VITALS
SYSTOLIC BLOOD PRESSURE: 101 MMHG | BODY MASS INDEX: 40.21 KG/M2 | TEMPERATURE: 98 F | SYSTOLIC BLOOD PRESSURE: 97 MMHG | OXYGEN SATURATION: 97 % | DIASTOLIC BLOOD PRESSURE: 64 MMHG | RESPIRATION RATE: 16 BRPM | WEIGHT: 257 LBS | TEMPERATURE: 97 F | HEIGHT: 68 IN | HEIGHT: 68 IN | BODY MASS INDEX: 38.95 KG/M2 | HEART RATE: 70 BPM | WEIGHT: 265.31 LBS | DIASTOLIC BLOOD PRESSURE: 75 MMHG | HEART RATE: 71 BPM

## 2019-01-01 VITALS
BODY MASS INDEX: 39.53 KG/M2 | HEART RATE: 70 BPM | TEMPERATURE: 97 F | TEMPERATURE: 98 F | HEIGHT: 68 IN | HEIGHT: 68 IN | DIASTOLIC BLOOD PRESSURE: 82 MMHG | WEIGHT: 260.81 LBS | WEIGHT: 261.44 LBS | HEART RATE: 65 BPM | DIASTOLIC BLOOD PRESSURE: 77 MMHG | BODY MASS INDEX: 39.62 KG/M2 | SYSTOLIC BLOOD PRESSURE: 111 MMHG | RESPIRATION RATE: 22 BRPM | SYSTOLIC BLOOD PRESSURE: 113 MMHG | RESPIRATION RATE: 22 BRPM

## 2019-01-01 VITALS
HEIGHT: 68 IN | TEMPERATURE: 97 F | HEART RATE: 67 BPM | HEART RATE: 70 BPM | WEIGHT: 263.44 LBS | DIASTOLIC BLOOD PRESSURE: 72 MMHG | HEIGHT: 68 IN | SYSTOLIC BLOOD PRESSURE: 108 MMHG | TEMPERATURE: 97 F | BODY MASS INDEX: 39.92 KG/M2 | WEIGHT: 257.5 LBS | DIASTOLIC BLOOD PRESSURE: 78 MMHG | BODY MASS INDEX: 39.03 KG/M2 | SYSTOLIC BLOOD PRESSURE: 101 MMHG

## 2019-01-01 VITALS
BODY MASS INDEX: 39.56 KG/M2 | WEIGHT: 261 LBS | SYSTOLIC BLOOD PRESSURE: 97 MMHG | OXYGEN SATURATION: 100 % | WEIGHT: 258 LBS | HEART RATE: 69 BPM | HEIGHT: 70 IN | HEIGHT: 68 IN | RESPIRATION RATE: 18 BRPM | DIASTOLIC BLOOD PRESSURE: 70 MMHG | TEMPERATURE: 98 F | BODY MASS INDEX: 36.79 KG/M2 | DIASTOLIC BLOOD PRESSURE: 63 MMHG | OXYGEN SATURATION: 89 % | BODY MASS INDEX: 38.21 KG/M2 | SYSTOLIC BLOOD PRESSURE: 90 MMHG | WEIGHT: 257 LBS | DIASTOLIC BLOOD PRESSURE: 64 MMHG | HEIGHT: 69 IN | HEART RATE: 68 BPM | SYSTOLIC BLOOD PRESSURE: 99 MMHG | HEART RATE: 70 BPM

## 2019-01-01 VITALS
HEART RATE: 70 BPM | BODY MASS INDEX: 39.06 KG/M2 | TEMPERATURE: 97 F | HEART RATE: 70 BPM | WEIGHT: 257.69 LBS | WEIGHT: 263.31 LBS | SYSTOLIC BLOOD PRESSURE: 96 MMHG | DIASTOLIC BLOOD PRESSURE: 69 MMHG | SYSTOLIC BLOOD PRESSURE: 97 MMHG | HEIGHT: 68 IN | TEMPERATURE: 98 F | BODY MASS INDEX: 39.91 KG/M2 | HEIGHT: 68 IN | OXYGEN SATURATION: 97 % | DIASTOLIC BLOOD PRESSURE: 60 MMHG

## 2019-01-01 VITALS
DIASTOLIC BLOOD PRESSURE: 81 MMHG | SYSTOLIC BLOOD PRESSURE: 109 MMHG | HEIGHT: 69 IN | SYSTOLIC BLOOD PRESSURE: 113 MMHG | WEIGHT: 260 LBS | WEIGHT: 260 LBS | SYSTOLIC BLOOD PRESSURE: 107 MMHG | HEART RATE: 69 BPM | WEIGHT: 262.19 LBS | HEART RATE: 63 BPM | HEART RATE: 70 BPM | HEIGHT: 70 IN | BODY MASS INDEX: 37.22 KG/M2 | DIASTOLIC BLOOD PRESSURE: 84 MMHG | DIASTOLIC BLOOD PRESSURE: 69 MMHG | TEMPERATURE: 98 F | BODY MASS INDEX: 37.22 KG/M2 | BODY MASS INDEX: 38.83 KG/M2 | HEIGHT: 70 IN

## 2019-01-01 VITALS
RESPIRATION RATE: 18 BRPM | TEMPERATURE: 98 F | OXYGEN SATURATION: 98 % | HEIGHT: 68 IN | SYSTOLIC BLOOD PRESSURE: 101 MMHG | HEART RATE: 72 BPM | WEIGHT: 252.19 LBS | DIASTOLIC BLOOD PRESSURE: 70 MMHG | BODY MASS INDEX: 38.22 KG/M2

## 2019-01-01 VITALS
TEMPERATURE: 97 F | WEIGHT: 238.56 LBS | DIASTOLIC BLOOD PRESSURE: 76 MMHG | SYSTOLIC BLOOD PRESSURE: 119 MMHG | BODY MASS INDEX: 36.16 KG/M2 | OXYGEN SATURATION: 100 % | RESPIRATION RATE: 18 BRPM | HEART RATE: 69 BPM | HEIGHT: 68 IN

## 2019-01-01 VITALS
SYSTOLIC BLOOD PRESSURE: 101 MMHG | WEIGHT: 240 LBS | HEIGHT: 70 IN | HEART RATE: 65 BPM | DIASTOLIC BLOOD PRESSURE: 72 MMHG | BODY MASS INDEX: 34.36 KG/M2

## 2019-01-01 VITALS
SYSTOLIC BLOOD PRESSURE: 120 MMHG | HEART RATE: 69 BPM | DIASTOLIC BLOOD PRESSURE: 82 MMHG | BODY MASS INDEX: 37.04 KG/M2 | HEIGHT: 71 IN | WEIGHT: 264.56 LBS

## 2019-01-01 VITALS
HEART RATE: 70 BPM | HEIGHT: 68 IN | BODY MASS INDEX: 39.56 KG/M2 | DIASTOLIC BLOOD PRESSURE: 73 MMHG | TEMPERATURE: 99 F | WEIGHT: 261 LBS | SYSTOLIC BLOOD PRESSURE: 104 MMHG

## 2019-01-01 VITALS
BODY MASS INDEX: 39.56 KG/M2 | HEIGHT: 68 IN | TEMPERATURE: 97 F | DIASTOLIC BLOOD PRESSURE: 88 MMHG | WEIGHT: 266.75 LBS | HEIGHT: 68 IN | RESPIRATION RATE: 18 BRPM | SYSTOLIC BLOOD PRESSURE: 116 MMHG | SYSTOLIC BLOOD PRESSURE: 111 MMHG | DIASTOLIC BLOOD PRESSURE: 77 MMHG | BODY MASS INDEX: 40.43 KG/M2 | HEART RATE: 66 BPM | HEART RATE: 71 BPM | WEIGHT: 261 LBS

## 2019-01-01 VITALS
BODY MASS INDEX: 40.63 KG/M2 | HEIGHT: 68 IN | WEIGHT: 268.06 LBS | DIASTOLIC BLOOD PRESSURE: 70 MMHG | SYSTOLIC BLOOD PRESSURE: 101 MMHG | HEART RATE: 72 BPM

## 2019-01-01 VITALS
TEMPERATURE: 98 F | BODY MASS INDEX: 39.76 KG/M2 | HEART RATE: 70 BPM | DIASTOLIC BLOOD PRESSURE: 64 MMHG | HEIGHT: 68 IN | WEIGHT: 262.38 LBS | SYSTOLIC BLOOD PRESSURE: 103 MMHG

## 2019-01-01 VITALS
WEIGHT: 246.94 LBS | OXYGEN SATURATION: 100 % | HEART RATE: 72 BPM | DIASTOLIC BLOOD PRESSURE: 66 MMHG | HEIGHT: 69 IN | BODY MASS INDEX: 36.57 KG/M2 | SYSTOLIC BLOOD PRESSURE: 97 MMHG | RESPIRATION RATE: 18 BRPM | TEMPERATURE: 98 F

## 2019-01-01 VITALS
WEIGHT: 260 LBS | OXYGEN SATURATION: 100 % | RESPIRATION RATE: 18 BRPM | DIASTOLIC BLOOD PRESSURE: 61 MMHG | HEIGHT: 68 IN | TEMPERATURE: 98 F | SYSTOLIC BLOOD PRESSURE: 118 MMHG | BODY MASS INDEX: 36.4 KG/M2 | SYSTOLIC BLOOD PRESSURE: 95 MMHG | DIASTOLIC BLOOD PRESSURE: 80 MMHG | WEIGHT: 260.19 LBS | HEART RATE: 71 BPM | HEART RATE: 69 BPM | HEIGHT: 71 IN | BODY MASS INDEX: 39.43 KG/M2

## 2019-01-01 VITALS
BODY MASS INDEX: 40.09 KG/M2 | SYSTOLIC BLOOD PRESSURE: 119 MMHG | HEIGHT: 68 IN | HEART RATE: 70 BPM | HEIGHT: 68 IN | TEMPERATURE: 98 F | DIASTOLIC BLOOD PRESSURE: 74 MMHG | DIASTOLIC BLOOD PRESSURE: 80 MMHG | WEIGHT: 264.56 LBS | HEART RATE: 69 BPM | BODY MASS INDEX: 39.76 KG/M2 | HEIGHT: 68 IN | WEIGHT: 262.38 LBS | TEMPERATURE: 98 F | TEMPERATURE: 97 F | RESPIRATION RATE: 18 BRPM | SYSTOLIC BLOOD PRESSURE: 109 MMHG | WEIGHT: 257.94 LBS | OXYGEN SATURATION: 98 % | SYSTOLIC BLOOD PRESSURE: 115 MMHG | HEART RATE: 70 BPM | BODY MASS INDEX: 39.09 KG/M2 | DIASTOLIC BLOOD PRESSURE: 68 MMHG

## 2019-01-01 VITALS
SYSTOLIC BLOOD PRESSURE: 102 MMHG | OXYGEN SATURATION: 100 % | TEMPERATURE: 98 F | BODY MASS INDEX: 39.4 KG/M2 | SYSTOLIC BLOOD PRESSURE: 129 MMHG | WEIGHT: 260 LBS | DIASTOLIC BLOOD PRESSURE: 79 MMHG | HEIGHT: 68 IN | RESPIRATION RATE: 18 BRPM | TEMPERATURE: 98 F | RESPIRATION RATE: 20 BRPM | HEART RATE: 69 BPM | HEART RATE: 73 BPM | BODY MASS INDEX: 39.4 KG/M2 | WEIGHT: 260 LBS | HEIGHT: 68 IN | DIASTOLIC BLOOD PRESSURE: 62 MMHG

## 2019-01-01 VITALS — SYSTOLIC BLOOD PRESSURE: 76 MMHG | DIASTOLIC BLOOD PRESSURE: 50 MMHG | HEART RATE: 70 BPM | TEMPERATURE: 97 F

## 2019-01-01 VITALS
HEIGHT: 68 IN | TEMPERATURE: 98 F | HEART RATE: 72 BPM | OXYGEN SATURATION: 98 % | RESPIRATION RATE: 18 BRPM | SYSTOLIC BLOOD PRESSURE: 136 MMHG | DIASTOLIC BLOOD PRESSURE: 76 MMHG | BODY MASS INDEX: 39.86 KG/M2 | WEIGHT: 263 LBS

## 2019-01-01 VITALS
HEART RATE: 70 BPM | HEIGHT: 70 IN | WEIGHT: 260 LBS | SYSTOLIC BLOOD PRESSURE: 96 MMHG | BODY MASS INDEX: 37.22 KG/M2 | DIASTOLIC BLOOD PRESSURE: 62 MMHG

## 2019-01-01 VITALS
WEIGHT: 257.94 LBS | BODY MASS INDEX: 39.09 KG/M2 | HEART RATE: 69 BPM | DIASTOLIC BLOOD PRESSURE: 73 MMHG | HEIGHT: 68 IN | SYSTOLIC BLOOD PRESSURE: 113 MMHG | TEMPERATURE: 98 F

## 2019-01-01 VITALS
DIASTOLIC BLOOD PRESSURE: 79 MMHG | HEIGHT: 68 IN | BODY MASS INDEX: 37.89 KG/M2 | WEIGHT: 250 LBS | SYSTOLIC BLOOD PRESSURE: 116 MMHG | HEART RATE: 70 BPM

## 2019-01-01 VITALS
DIASTOLIC BLOOD PRESSURE: 41 MMHG | TEMPERATURE: 98 F | SYSTOLIC BLOOD PRESSURE: 76 MMHG | HEART RATE: 69 BPM | RESPIRATION RATE: 40 BRPM | HEIGHT: 68 IN | BODY MASS INDEX: 41.98 KG/M2 | OXYGEN SATURATION: 46 % | WEIGHT: 277 LBS

## 2019-01-01 VITALS — SYSTOLIC BLOOD PRESSURE: 96 MMHG | HEART RATE: 70 BPM | DIASTOLIC BLOOD PRESSURE: 66 MMHG

## 2019-01-01 VITALS
HEIGHT: 70 IN | SYSTOLIC BLOOD PRESSURE: 110 MMHG | HEART RATE: 69 BPM | WEIGHT: 252 LBS | DIASTOLIC BLOOD PRESSURE: 74 MMHG | BODY MASS INDEX: 36.08 KG/M2

## 2019-01-01 VITALS
DIASTOLIC BLOOD PRESSURE: 86 MMHG | WEIGHT: 253.5 LBS | HEIGHT: 68 IN | SYSTOLIC BLOOD PRESSURE: 126 MMHG | BODY MASS INDEX: 38.42 KG/M2 | HEART RATE: 69 BPM

## 2019-01-01 VITALS
RESPIRATION RATE: 20 BRPM | DIASTOLIC BLOOD PRESSURE: 72 MMHG | TEMPERATURE: 98 F | HEART RATE: 70 BPM | SYSTOLIC BLOOD PRESSURE: 113 MMHG | WEIGHT: 262 LBS | HEIGHT: 69 IN | BODY MASS INDEX: 38.8 KG/M2 | OXYGEN SATURATION: 98 %

## 2019-01-01 DIAGNOSIS — Z95.0 CARDIAC PACEMAKER IN SITU: Primary | ICD-10-CM

## 2019-01-01 DIAGNOSIS — Z79.01 LONG TERM CURRENT USE OF ANTICOAGULANT THERAPY: ICD-10-CM

## 2019-01-01 DIAGNOSIS — I48.21 PERMANENT ATRIAL FIBRILLATION: ICD-10-CM

## 2019-01-01 DIAGNOSIS — E83.9 CHRONIC KIDNEY DISEASE-MINERAL AND BONE DISORDER: ICD-10-CM

## 2019-01-01 DIAGNOSIS — R57.9 SHOCK: ICD-10-CM

## 2019-01-01 DIAGNOSIS — I27.20 PULMONARY HYPERTENSION: ICD-10-CM

## 2019-01-01 DIAGNOSIS — J96.11 CHRONIC RESPIRATORY FAILURE WITH HYPOXIA: ICD-10-CM

## 2019-01-01 DIAGNOSIS — I95.9 HYPOTENSION: ICD-10-CM

## 2019-01-01 DIAGNOSIS — G63 POLYNEUROPATHY ASSOCIATED WITH UNDERLYING DISEASE: ICD-10-CM

## 2019-01-01 DIAGNOSIS — Z99.2 END STAGE RENAL FAILURE ON DIALYSIS: ICD-10-CM

## 2019-01-01 DIAGNOSIS — D63.1 ANEMIA IN ESRD (END-STAGE RENAL DISEASE): ICD-10-CM

## 2019-01-01 DIAGNOSIS — L97.521 SKIN ULCER OF TOE OF LEFT FOOT, LIMITED TO BREAKDOWN OF SKIN: ICD-10-CM

## 2019-01-01 DIAGNOSIS — Z79.01 CURRENT USE OF LONG TERM ANTICOAGULATION: ICD-10-CM

## 2019-01-01 DIAGNOSIS — I50.42 CHRONIC COMBINED SYSTOLIC AND DIASTOLIC HEART FAILURE: ICD-10-CM

## 2019-01-01 DIAGNOSIS — I48.0 PAROXYSMAL ATRIAL FIBRILLATION: ICD-10-CM

## 2019-01-01 DIAGNOSIS — I49.3 FREQUENT PVCS: ICD-10-CM

## 2019-01-01 DIAGNOSIS — Z99.2 END STAGE RENAL FAILURE ON DIALYSIS: Chronic | ICD-10-CM

## 2019-01-01 DIAGNOSIS — T82.49XD CLOTTED DIALYSIS ACCESS, SUBSEQUENT ENCOUNTER: ICD-10-CM

## 2019-01-01 DIAGNOSIS — L97.912 ULCER OF RIGHT LOWER EXTREMITY WITH FAT LAYER EXPOSED: Primary | ICD-10-CM

## 2019-01-01 DIAGNOSIS — T82.858D STENOSIS OF ARTERIOVENOUS GRAFT, SUBSEQUENT ENCOUNTER: ICD-10-CM

## 2019-01-01 DIAGNOSIS — I95.9 HYPOTENSIVE EPISODE: ICD-10-CM

## 2019-01-01 DIAGNOSIS — E87.20 LACTIC ACIDEMIA: ICD-10-CM

## 2019-01-01 DIAGNOSIS — E78.2 MIXED HYPERLIPIDEMIA: ICD-10-CM

## 2019-01-01 DIAGNOSIS — N18.6 ESRD (END STAGE RENAL DISEASE): ICD-10-CM

## 2019-01-01 DIAGNOSIS — F41.9 ANXIETY: ICD-10-CM

## 2019-01-01 DIAGNOSIS — E55.9 VITAMIN D INSUFFICIENCY: ICD-10-CM

## 2019-01-01 DIAGNOSIS — I50.42 CHRONIC COMBINED SYSTOLIC AND DIASTOLIC HEART FAILURE: Primary | Chronic | ICD-10-CM

## 2019-01-01 DIAGNOSIS — E66.9 OBESITY, UNSPECIFIED CLASSIFICATION, UNSPECIFIED OBESITY TYPE, UNSPECIFIED WHETHER SERIOUS COMORBIDITY PRESENT: ICD-10-CM

## 2019-01-01 DIAGNOSIS — E11.43 CONTROLLED TYPE 2 DIABETES MELLITUS WITH DIABETIC AUTONOMIC NEUROPATHY, WITHOUT LONG-TERM CURRENT USE OF INSULIN: ICD-10-CM

## 2019-01-01 DIAGNOSIS — G40.909 SEIZURE DISORDER: Chronic | ICD-10-CM

## 2019-01-01 DIAGNOSIS — D86.0 PULMONARY SARCOIDOSIS: ICD-10-CM

## 2019-01-01 DIAGNOSIS — I50.22 CHRONIC SYSTOLIC HEART FAILURE: Primary | ICD-10-CM

## 2019-01-01 DIAGNOSIS — R06.02 SOB (SHORTNESS OF BREATH): Primary | ICD-10-CM

## 2019-01-01 DIAGNOSIS — N18.6 ANEMIA IN ESRD (END-STAGE RENAL DISEASE): ICD-10-CM

## 2019-01-01 DIAGNOSIS — T82.858D ARTERIOVENOUS FISTULA STENOSIS, SUBSEQUENT ENCOUNTER: Primary | ICD-10-CM

## 2019-01-01 DIAGNOSIS — J96.22 ACUTE ON CHRONIC RESPIRATORY FAILURE WITH HYPOXIA AND HYPERCAPNIA: ICD-10-CM

## 2019-01-01 DIAGNOSIS — T82.858A ARTERIOVENOUS FISTULA STENOSIS: ICD-10-CM

## 2019-01-01 DIAGNOSIS — Z51.5 PALLIATIVE CARE ENCOUNTER: ICD-10-CM

## 2019-01-01 DIAGNOSIS — M54.12 CERVICAL RADICULOPATHY: ICD-10-CM

## 2019-01-01 DIAGNOSIS — E87.5 HYPERKALEMIA: Primary | ICD-10-CM

## 2019-01-01 DIAGNOSIS — N18.6 END STAGE RENAL FAILURE ON DIALYSIS: Primary | ICD-10-CM

## 2019-01-01 DIAGNOSIS — R07.9 CHEST PAIN: ICD-10-CM

## 2019-01-01 DIAGNOSIS — I95.0 IDIOPATHIC HYPOTENSION: ICD-10-CM

## 2019-01-01 DIAGNOSIS — E66.01 MORBID OBESITY WITH BMI OF 40.0-44.9, ADULT: Chronic | ICD-10-CM

## 2019-01-01 DIAGNOSIS — T82.858D AV GRAFT STENOSIS, SUBSEQUENT ENCOUNTER: Primary | ICD-10-CM

## 2019-01-01 DIAGNOSIS — E78.2 MIXED HYPERLIPIDEMIA: Chronic | ICD-10-CM

## 2019-01-01 DIAGNOSIS — G40.909 SEIZURE DISORDER: ICD-10-CM

## 2019-01-01 DIAGNOSIS — Z97.8 ENDOTRACHEALLY INTUBATED: ICD-10-CM

## 2019-01-01 DIAGNOSIS — E83.51 HYPOCALCEMIA: ICD-10-CM

## 2019-01-01 DIAGNOSIS — E27.49 SECONDARY ADRENAL INSUFFICIENCY: ICD-10-CM

## 2019-01-01 DIAGNOSIS — T82.858D STENOSIS OF ARTERIOVENOUS GRAFT, SUBSEQUENT ENCOUNTER: Primary | ICD-10-CM

## 2019-01-01 DIAGNOSIS — T82.49XD CLOTTED DIALYSIS ACCESS, SUBSEQUENT ENCOUNTER: Primary | ICD-10-CM

## 2019-01-01 DIAGNOSIS — L97.529 DIABETIC ULCER OF BOTH FEET: ICD-10-CM

## 2019-01-01 DIAGNOSIS — I50.9 CHF (CONGESTIVE HEART FAILURE): ICD-10-CM

## 2019-01-01 DIAGNOSIS — Z29.89 SEIZURE PROPHYLAXIS: ICD-10-CM

## 2019-01-01 DIAGNOSIS — I50.23 ACUTE ON CHRONIC SYSTOLIC CONGESTIVE HEART FAILURE: ICD-10-CM

## 2019-01-01 DIAGNOSIS — E66.01 SEVERE OBESITY (BMI 35.0-35.9 WITH COMORBIDITY): ICD-10-CM

## 2019-01-01 DIAGNOSIS — I50.43 ACUTE ON CHRONIC COMBINED SYSTOLIC AND DIASTOLIC CONGESTIVE HEART FAILURE: ICD-10-CM

## 2019-01-01 DIAGNOSIS — B35.1 ONYCHOMYCOSIS: Primary | ICD-10-CM

## 2019-01-01 DIAGNOSIS — G40.909 SEIZURE DISORDER: Primary | Chronic | ICD-10-CM

## 2019-01-01 DIAGNOSIS — L97.912 ULCER OF RIGHT LOWER EXTREMITY WITH FAT LAYER EXPOSED: ICD-10-CM

## 2019-01-01 DIAGNOSIS — J98.4 CRLD (CHRONIC RESTRICTIVE LUNG DISEASE): ICD-10-CM

## 2019-01-01 DIAGNOSIS — E87.20 LACTIC ACIDOSIS: ICD-10-CM

## 2019-01-01 DIAGNOSIS — G63 POLYNEUROPATHY ASSOCIATED WITH UNDERLYING DISEASE: Primary | ICD-10-CM

## 2019-01-01 DIAGNOSIS — R06.02 SHORTNESS OF BREATH: ICD-10-CM

## 2019-01-01 DIAGNOSIS — N18.6 END STAGE RENAL FAILURE ON DIALYSIS: ICD-10-CM

## 2019-01-01 DIAGNOSIS — N18.9 CHRONIC KIDNEY DISEASE-MINERAL AND BONE DISORDER: ICD-10-CM

## 2019-01-01 DIAGNOSIS — L87.0 KYRLE'S DISEASE: ICD-10-CM
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
## 2019-01-01 DIAGNOSIS — E04.2 MULTINODULAR THYROID: ICD-10-CM

## 2019-01-01 DIAGNOSIS — J96.21 ACUTE ON CHRONIC RESPIRATORY FAILURE WITH HYPOXIA: ICD-10-CM

## 2019-01-01 DIAGNOSIS — D72.829 LEUKOCYTOSIS, UNSPECIFIED TYPE: ICD-10-CM

## 2019-01-01 DIAGNOSIS — L97.512 SKIN ULCER OF RIGHT FOOT WITH FAT LAYER EXPOSED: Primary | ICD-10-CM

## 2019-01-01 DIAGNOSIS — R53.1 WEAKNESS: ICD-10-CM

## 2019-01-01 DIAGNOSIS — E11.9 CONTROLLED TYPE 2 DIABETES MELLITUS WITHOUT COMPLICATION, WITHOUT LONG-TERM CURRENT USE OF INSULIN: ICD-10-CM
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
## 2019-01-01 DIAGNOSIS — D86.0 PULMONARY SARCOIDOSIS: Chronic | ICD-10-CM

## 2019-01-01 DIAGNOSIS — M62.838 MUSCLE SPASM: ICD-10-CM

## 2019-01-01 DIAGNOSIS — T82.858D ARTERIOVENOUS FISTULA STENOSIS, SUBSEQUENT ENCOUNTER: ICD-10-CM

## 2019-01-01 DIAGNOSIS — G72.9 MYOPATHY: ICD-10-CM

## 2019-01-01 DIAGNOSIS — T82.868D AV FISTULA THROMBOSIS, SUBSEQUENT ENCOUNTER: ICD-10-CM

## 2019-01-01 DIAGNOSIS — D84.9 IMMUNOCOMPROMISED PATIENT: ICD-10-CM

## 2019-01-01 DIAGNOSIS — I95.1 ORTHOSTATIC HYPOTENSION: ICD-10-CM

## 2019-01-01 DIAGNOSIS — E11.42 DIABETIC POLYNEUROPATHY ASSOCIATED WITH TYPE 2 DIABETES MELLITUS: Primary | ICD-10-CM

## 2019-01-01 DIAGNOSIS — I49.3 PVC'S (PREMATURE VENTRICULAR CONTRACTIONS): ICD-10-CM

## 2019-01-01 DIAGNOSIS — L87.0 KYRLE'S DISEASE: Primary | ICD-10-CM

## 2019-01-01 DIAGNOSIS — E11.43 TYPE 2 DIABETES MELLITUS WITH DIABETIC AUTONOMIC NEUROPATHY, WITHOUT LONG-TERM CURRENT USE OF INSULIN: Primary | ICD-10-CM

## 2019-01-01 DIAGNOSIS — I49.9 ARRHYTHMIA: ICD-10-CM

## 2019-01-01 DIAGNOSIS — J96.21 ACUTE AND CHRONIC RESPIRATORY FAILURE WITH HYPOXIA: ICD-10-CM

## 2019-01-01 DIAGNOSIS — E11.621 DIABETIC ULCER OF BOTH FEET: ICD-10-CM

## 2019-01-01 DIAGNOSIS — I73.9 PVD (PERIPHERAL VASCULAR DISEASE): ICD-10-CM

## 2019-01-01 DIAGNOSIS — Z79.01 CURRENT USE OF LONG TERM ANTICOAGULATION: Primary | ICD-10-CM

## 2019-01-01 DIAGNOSIS — I42.9 CARDIOMYOPATHY, UNSPECIFIED TYPE: ICD-10-CM

## 2019-01-01 DIAGNOSIS — I49.3 VENTRICULAR ECTOPY: ICD-10-CM

## 2019-01-01 DIAGNOSIS — I83.013 VENOUS ULCER OF ANKLE, RIGHT: Primary | ICD-10-CM

## 2019-01-01 DIAGNOSIS — T82.858D AV GRAFT STENOSIS, SUBSEQUENT ENCOUNTER: ICD-10-CM

## 2019-01-01 DIAGNOSIS — E11.8 TYPE 2 DIABETES MELLITUS WITH COMPLICATION, WITHOUT LONG-TERM CURRENT USE OF INSULIN: Primary | ICD-10-CM

## 2019-01-01 DIAGNOSIS — Z79.52 CURRENT CHRONIC USE OF SYSTEMIC STEROIDS: ICD-10-CM

## 2019-01-01 DIAGNOSIS — J96.21 ACUTE ON CHRONIC RESPIRATORY FAILURE WITH HYPOXIA AND HYPERCAPNIA: ICD-10-CM

## 2019-01-01 DIAGNOSIS — R53.1 WEAKNESS GENERALIZED: ICD-10-CM

## 2019-01-01 DIAGNOSIS — E11.8 CONTROLLED TYPE 2 DIABETES MELLITUS WITH COMPLICATION, WITHOUT LONG-TERM CURRENT USE OF INSULIN: ICD-10-CM

## 2019-01-01 DIAGNOSIS — Z95.0 BIVENTRICULAR CARDIAC PACEMAKER IN SITU: ICD-10-CM

## 2019-01-01 DIAGNOSIS — J18.9 HEALTHCARE-ASSOCIATED PNEUMONIA: ICD-10-CM

## 2019-01-01 DIAGNOSIS — B35.1 ONYCHOMYCOSIS: ICD-10-CM

## 2019-01-01 DIAGNOSIS — M25.552 PAIN OF LEFT HIP JOINT: ICD-10-CM

## 2019-01-01 DIAGNOSIS — I62.9 INTRACRANIAL HEMORRHAGE: ICD-10-CM

## 2019-01-01 DIAGNOSIS — N18.6 ESRD ON HEMODIALYSIS: ICD-10-CM

## 2019-01-01 DIAGNOSIS — Z79.52 CURRENT CHRONIC USE OF SYSTEMIC STEROIDS: Chronic | ICD-10-CM

## 2019-01-01 DIAGNOSIS — Z99.2 END STAGE RENAL FAILURE ON DIALYSIS: Primary | ICD-10-CM

## 2019-01-01 DIAGNOSIS — Z95.0 CARDIAC PACEMAKER IN SITU: ICD-10-CM

## 2019-01-01 DIAGNOSIS — I50.42 CHRONIC COMBINED SYSTOLIC AND DIASTOLIC HEART FAILURE: Chronic | ICD-10-CM

## 2019-01-01 DIAGNOSIS — M62.81 MUSCLE WEAKNESS: Primary | ICD-10-CM

## 2019-01-01 DIAGNOSIS — R10.30 LOWER ABDOMINAL PAIN: ICD-10-CM

## 2019-01-01 DIAGNOSIS — L97.509 ULCER OF FOOT, UNSPECIFIED LATERALITY, UNSPECIFIED ULCER STAGE: ICD-10-CM

## 2019-01-01 DIAGNOSIS — R74.8 ELEVATED ALKALINE PHOSPHATASE LEVEL: Primary | ICD-10-CM

## 2019-01-01 DIAGNOSIS — R78.81 GRAM-NEGATIVE BACTEREMIA: ICD-10-CM

## 2019-01-01 DIAGNOSIS — J96.21 ACUTE AND CHRONIC RESPIRATORY FAILURE WITH HYPOXIA: Primary | ICD-10-CM
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
## 2019-01-01 DIAGNOSIS — I95.1 ORTHOSTATIC HYPOTENSION: Primary | ICD-10-CM

## 2019-01-01 DIAGNOSIS — Z86.2 HISTORY OF SARCOIDOSIS: ICD-10-CM

## 2019-01-01 DIAGNOSIS — R74.01 TRANSAMINITIS: ICD-10-CM

## 2019-01-01 DIAGNOSIS — J18.9 PNEUMONIA OF LEFT LOWER LOBE DUE TO INFECTIOUS ORGANISM: Primary | ICD-10-CM

## 2019-01-01 DIAGNOSIS — I42.9 CARDIOMYOPATHY: ICD-10-CM

## 2019-01-01 DIAGNOSIS — Z87.2 HEALED FOOT ULCER: Primary | ICD-10-CM

## 2019-01-01 DIAGNOSIS — D86.9 SARCOIDOSIS: ICD-10-CM

## 2019-01-01 DIAGNOSIS — L97.521 SKIN ULCER OF TOE OF LEFT FOOT, LIMITED TO BREAKDOWN OF SKIN: Primary | ICD-10-CM

## 2019-01-01 DIAGNOSIS — J18.9 PNEUMONIA OF BOTH LUNGS DUE TO INFECTIOUS ORGANISM, UNSPECIFIED PART OF LUNG: ICD-10-CM

## 2019-01-01 DIAGNOSIS — M89.9 CHRONIC KIDNEY DISEASE-MINERAL AND BONE DISORDER: ICD-10-CM

## 2019-01-01 DIAGNOSIS — Z79.01 LONG TERM (CURRENT) USE OF ANTICOAGULANTS: Primary | ICD-10-CM

## 2019-01-01 DIAGNOSIS — N18.6 ESRD ON HEMODIALYSIS: Primary | ICD-10-CM

## 2019-01-01 DIAGNOSIS — I27.81 COR PULMONALE: ICD-10-CM

## 2019-01-01 DIAGNOSIS — Z79.52 IMMUNOCOMPROMISED DUE TO CORTICOSTEROIDS: ICD-10-CM

## 2019-01-01 DIAGNOSIS — E78.5 HYPERLIPIDEMIA, UNSPECIFIED HYPERLIPIDEMIA TYPE: Primary | ICD-10-CM

## 2019-01-01 DIAGNOSIS — E87.70 HYPERVOLEMIA, UNSPECIFIED HYPERVOLEMIA TYPE: ICD-10-CM

## 2019-01-01 DIAGNOSIS — I27.20 PULMONARY HYPERTENSION: Chronic | ICD-10-CM

## 2019-01-01 DIAGNOSIS — I95.9 HYPOTENSION, UNSPECIFIED HYPOTENSION TYPE: ICD-10-CM

## 2019-01-01 DIAGNOSIS — M85.80 OSTEOPENIA DETERMINED BY X-RAY: ICD-10-CM

## 2019-01-01 DIAGNOSIS — L73.9 FOLLICULITIS: ICD-10-CM

## 2019-01-01 DIAGNOSIS — K21.9 GASTROESOPHAGEAL REFLUX DISEASE WITHOUT ESOPHAGITIS: ICD-10-CM

## 2019-01-01 DIAGNOSIS — B35.1 ONYCHOMYCOSIS DUE TO DERMATOPHYTE: ICD-10-CM

## 2019-01-01 DIAGNOSIS — T38.0X5A IMMUNOCOMPROMISED DUE TO CORTICOSTEROIDS: ICD-10-CM

## 2019-01-01 DIAGNOSIS — G93.40 ENCEPHALOPATHY: ICD-10-CM

## 2019-01-01 DIAGNOSIS — I44.2 CHB (COMPLETE HEART BLOCK): ICD-10-CM

## 2019-01-01 DIAGNOSIS — G56.03 BILATERAL CARPAL TUNNEL SYNDROME: ICD-10-CM

## 2019-01-01 DIAGNOSIS — L97.512 SKIN ULCER OF RIGHT FOOT WITH FAT LAYER EXPOSED: ICD-10-CM

## 2019-01-01 DIAGNOSIS — R78.81 BACTEREMIA: ICD-10-CM

## 2019-01-01 DIAGNOSIS — G40.009 PARTIAL IDIOPATHIC EPILEPSY WITH SEIZURES OF LOCALIZED ONSET, NOT INTRACTABLE, WITHOUT STATUS EPILEPTICUS: ICD-10-CM

## 2019-01-01 DIAGNOSIS — N18.6 END STAGE RENAL FAILURE ON DIALYSIS: Chronic | ICD-10-CM

## 2019-01-01 DIAGNOSIS — E87.1 HYPONATREMIA: ICD-10-CM

## 2019-01-01 DIAGNOSIS — Z99.2 ESRD ON HEMODIALYSIS: ICD-10-CM

## 2019-01-01 DIAGNOSIS — D84.9 IMMUNOSUPPRESSED STATUS: ICD-10-CM

## 2019-01-01 DIAGNOSIS — L97.319 VENOUS ULCER OF ANKLE, RIGHT: Primary | ICD-10-CM

## 2019-01-01 DIAGNOSIS — R79.89 ELEVATED LFTS: Primary | ICD-10-CM

## 2019-01-01 DIAGNOSIS — D84.821 IMMUNOCOMPROMISED DUE TO CORTICOSTEROIDS: ICD-10-CM

## 2019-01-01 DIAGNOSIS — L97.511 SKIN ULCER OF TOE OF RIGHT FOOT, LIMITED TO BREAKDOWN OF SKIN: ICD-10-CM

## 2019-01-01 DIAGNOSIS — T82.590D MALFUNCTION OF ARTERIOVENOUS GRAFT, SUBSEQUENT ENCOUNTER: ICD-10-CM

## 2019-01-01 DIAGNOSIS — R79.89 ELEVATED TROPONIN: ICD-10-CM

## 2019-01-01 DIAGNOSIS — E87.5 HYPERKALEMIA: ICD-10-CM

## 2019-01-01 DIAGNOSIS — S06.5XAA SDH (SUBDURAL HEMATOMA): Primary | ICD-10-CM

## 2019-01-01 DIAGNOSIS — I50.43 ACUTE ON CHRONIC COMBINED SYSTOLIC AND DIASTOLIC HEART FAILURE: ICD-10-CM

## 2019-01-01 DIAGNOSIS — L97.519 DIABETIC ULCER OF BOTH FEET: ICD-10-CM

## 2019-01-01 DIAGNOSIS — Z99.2 ESRD ON HEMODIALYSIS: Primary | ICD-10-CM

## 2019-01-01 DIAGNOSIS — Z79.01 LONG TERM (CURRENT) USE OF ANTICOAGULANTS: ICD-10-CM

## 2019-01-01 DIAGNOSIS — L60.1 ONYCHOLYSIS: Primary | ICD-10-CM

## 2019-01-01 DIAGNOSIS — I48.0 PAROXYSMAL A-FIB: ICD-10-CM

## 2019-01-01 DIAGNOSIS — G62.9 NEUROPATHY: ICD-10-CM

## 2019-01-01 LAB
1,25(OH)2D3 SERPL-MCNC: 56 PG/ML (ref 20–79)
1,3 BETA GLUCAN SPEC-MCNC: 110 PG/ML
1,3 BETA GLUCAN SPEC-MCNC: 159 PG/ML
ABO + RH BLD: NORMAL
ALBUMIN SERPL BCP-MCNC: 2 G/DL (ref 3.5–5.2)
ALBUMIN SERPL BCP-MCNC: 2.1 G/DL (ref 3.5–5.2)
ALBUMIN SERPL BCP-MCNC: 2.2 G/DL (ref 3.5–5.2)
ALBUMIN SERPL BCP-MCNC: 2.3 G/DL (ref 3.5–5.2)
ALBUMIN SERPL BCP-MCNC: 2.4 G/DL (ref 3.5–5.2)
ALBUMIN SERPL BCP-MCNC: 2.5 G/DL (ref 3.5–5.2)
ALBUMIN SERPL BCP-MCNC: 2.6 G/DL (ref 3.5–5.2)
ALBUMIN SERPL BCP-MCNC: 2.7 G/DL (ref 3.5–5.2)
ALBUMIN SERPL BCP-MCNC: 2.8 G/DL (ref 3.5–5.2)
ALBUMIN SERPL BCP-MCNC: 2.9 G/DL (ref 3.5–5.2)
ALBUMIN SERPL BCP-MCNC: 3 G/DL
ALBUMIN SERPL BCP-MCNC: 3 G/DL (ref 3.5–5.2)
ALBUMIN SERPL BCP-MCNC: 3.1 G/DL (ref 3.5–5.2)
ALBUMIN SERPL BCP-MCNC: 3.2 G/DL
ALBUMIN SERPL BCP-MCNC: 3.2 G/DL (ref 3.5–5.2)
ALBUMIN SERPL BCP-MCNC: 3.3 G/DL (ref 3.5–5.2)
ALBUMIN SERPL BCP-MCNC: 3.4 G/DL
ALBUMIN SERPL BCP-MCNC: 3.4 G/DL (ref 3.5–5.2)
ALBUMIN SERPL BCP-MCNC: 3.5 G/DL
ALBUMIN SERPL BCP-MCNC: 3.5 G/DL (ref 3.5–5.2)
ALLENS TEST: ABNORMAL
ALP SERPL-CCNC: 102 U/L (ref 55–135)
ALP SERPL-CCNC: 104 U/L (ref 55–135)
ALP SERPL-CCNC: 104 U/L (ref 55–135)
ALP SERPL-CCNC: 105 U/L (ref 55–135)
ALP SERPL-CCNC: 105 U/L (ref 55–135)
ALP SERPL-CCNC: 108 U/L (ref 55–135)
ALP SERPL-CCNC: 108 U/L (ref 55–135)
ALP SERPL-CCNC: 109 U/L (ref 55–135)
ALP SERPL-CCNC: 110 U/L (ref 55–135)
ALP SERPL-CCNC: 110 U/L (ref 55–135)
ALP SERPL-CCNC: 112 U/L (ref 55–135)
ALP SERPL-CCNC: 112 U/L (ref 55–135)
ALP SERPL-CCNC: 113 U/L (ref 55–135)
ALP SERPL-CCNC: 115 U/L (ref 55–135)
ALP SERPL-CCNC: 116 U/L (ref 55–135)
ALP SERPL-CCNC: 117 U/L (ref 55–135)
ALP SERPL-CCNC: 118 U/L (ref 55–135)
ALP SERPL-CCNC: 119 U/L (ref 55–135)
ALP SERPL-CCNC: 119 U/L (ref 55–135)
ALP SERPL-CCNC: 120 U/L (ref 55–135)
ALP SERPL-CCNC: 122 U/L (ref 55–135)
ALP SERPL-CCNC: 124 U/L (ref 55–135)
ALP SERPL-CCNC: 127 U/L (ref 55–135)
ALP SERPL-CCNC: 127 U/L (ref 55–135)
ALP SERPL-CCNC: 129 U/L
ALP SERPL-CCNC: 129 U/L (ref 55–135)
ALP SERPL-CCNC: 145 U/L (ref 55–135)
ALP SERPL-CCNC: 152 U/L (ref 55–135)
ALP SERPL-CCNC: 163 U/L
ALP SERPL-CCNC: 189 U/L (ref 55–135)
ALP SERPL-CCNC: 211 U/L (ref 55–135)
ALP SERPL-CCNC: 220 U/L (ref 55–135)
ALP SERPL-CCNC: 238 U/L (ref 55–135)
ALP SERPL-CCNC: 255 U/L (ref 55–135)
ALP SERPL-CCNC: 257 U/L (ref 55–135)
ALP SERPL-CCNC: 283 U/L (ref 55–135)
ALP SERPL-CCNC: 297 U/L (ref 55–135)
ALP SERPL-CCNC: 348 U/L (ref 55–135)
ALP SERPL-CCNC: 366 U/L (ref 55–135)
ALP SERPL-CCNC: 377 U/L (ref 55–135)
ALP SERPL-CCNC: 407 U/L (ref 55–135)
ALP SERPL-CCNC: 419 U/L (ref 55–135)
ALP SERPL-CCNC: 434 U/L (ref 55–135)
ALP SERPL-CCNC: 79 U/L (ref 55–135)
ALP SERPL-CCNC: 82 U/L (ref 55–135)
ALP SERPL-CCNC: 84 U/L (ref 55–135)
ALP SERPL-CCNC: 89 U/L (ref 55–135)
ALP SERPL-CCNC: 90 U/L (ref 55–135)
ALP SERPL-CCNC: 92 U/L (ref 55–135)
ALP SERPL-CCNC: 93 U/L (ref 55–135)
ALP SERPL-CCNC: 93 U/L (ref 55–135)
ALP SERPL-CCNC: 94 U/L (ref 55–135)
ALP SERPL-CCNC: 95 U/L (ref 55–135)
ALP SERPL-CCNC: 96 U/L (ref 55–135)
ALP SERPL-CCNC: 96 U/L (ref 55–135)
ALP SERPL-CCNC: 97 U/L (ref 55–135)
ALT SERPL W/O P-5'-P-CCNC: 107 U/L (ref 10–44)
ALT SERPL W/O P-5'-P-CCNC: 107 U/L (ref 10–44)
ALT SERPL W/O P-5'-P-CCNC: 108 U/L (ref 10–44)
ALT SERPL W/O P-5'-P-CCNC: 120 U/L (ref 10–44)
ALT SERPL W/O P-5'-P-CCNC: 123 U/L (ref 10–44)
ALT SERPL W/O P-5'-P-CCNC: 13 U/L (ref 10–44)
ALT SERPL W/O P-5'-P-CCNC: 14 U/L (ref 10–44)
ALT SERPL W/O P-5'-P-CCNC: 14 U/L (ref 10–44)
ALT SERPL W/O P-5'-P-CCNC: 142 U/L (ref 10–44)
ALT SERPL W/O P-5'-P-CCNC: 145 U/L (ref 10–44)
ALT SERPL W/O P-5'-P-CCNC: 15 U/L (ref 10–44)
ALT SERPL W/O P-5'-P-CCNC: 151 U/L (ref 10–44)
ALT SERPL W/O P-5'-P-CCNC: 159 U/L (ref 10–44)
ALT SERPL W/O P-5'-P-CCNC: 16 U/L (ref 10–44)
ALT SERPL W/O P-5'-P-CCNC: 17 U/L (ref 10–44)
ALT SERPL W/O P-5'-P-CCNC: 18 U/L (ref 10–44)
ALT SERPL W/O P-5'-P-CCNC: 19 U/L
ALT SERPL W/O P-5'-P-CCNC: 192 U/L (ref 10–44)
ALT SERPL W/O P-5'-P-CCNC: 198 U/L (ref 10–44)
ALT SERPL W/O P-5'-P-CCNC: 20 U/L (ref 10–44)
ALT SERPL W/O P-5'-P-CCNC: 20 U/L (ref 10–44)
ALT SERPL W/O P-5'-P-CCNC: 22 U/L
ALT SERPL W/O P-5'-P-CCNC: 22 U/L (ref 10–44)
ALT SERPL W/O P-5'-P-CCNC: 22 U/L (ref 10–44)
ALT SERPL W/O P-5'-P-CCNC: 233 U/L (ref 10–44)
ALT SERPL W/O P-5'-P-CCNC: 24 U/L (ref 10–44)
ALT SERPL W/O P-5'-P-CCNC: 24 U/L (ref 10–44)
ALT SERPL W/O P-5'-P-CCNC: 248 U/L (ref 10–44)
ALT SERPL W/O P-5'-P-CCNC: 265 U/L (ref 10–44)
ALT SERPL W/O P-5'-P-CCNC: 29 U/L (ref 10–44)
ALT SERPL W/O P-5'-P-CCNC: 310 U/L (ref 10–44)
ALT SERPL W/O P-5'-P-CCNC: 312 U/L (ref 10–44)
ALT SERPL W/O P-5'-P-CCNC: 339 U/L (ref 10–44)
ALT SERPL W/O P-5'-P-CCNC: 35 U/L (ref 10–44)
ALT SERPL W/O P-5'-P-CCNC: 35 U/L (ref 10–44)
ALT SERPL W/O P-5'-P-CCNC: 388 U/L (ref 10–44)
ALT SERPL W/O P-5'-P-CCNC: 42 U/L (ref 10–44)
ALT SERPL W/O P-5'-P-CCNC: 432 U/L (ref 10–44)
ALT SERPL W/O P-5'-P-CCNC: 44 U/L (ref 10–44)
ALT SERPL W/O P-5'-P-CCNC: 46 U/L (ref 10–44)
ALT SERPL W/O P-5'-P-CCNC: 48 U/L (ref 10–44)
ALT SERPL W/O P-5'-P-CCNC: 52 U/L (ref 10–44)
ALT SERPL W/O P-5'-P-CCNC: 55 U/L (ref 10–44)
ALT SERPL W/O P-5'-P-CCNC: 58 U/L (ref 10–44)
ALT SERPL W/O P-5'-P-CCNC: 58 U/L (ref 10–44)
ALT SERPL W/O P-5'-P-CCNC: 61 U/L (ref 10–44)
ALT SERPL W/O P-5'-P-CCNC: 67 U/L (ref 10–44)
ALT SERPL W/O P-5'-P-CCNC: 83 U/L (ref 10–44)
ALT SERPL W/O P-5'-P-CCNC: 88 U/L (ref 10–44)
ALT SERPL W/O P-5'-P-CCNC: 88 U/L (ref 10–44)
ANA SER QL IF: NORMAL
ANION GAP SERPL CALC-SCNC: 10 MMOL/L (ref 8–16)
ANION GAP SERPL CALC-SCNC: 11 MMOL/L (ref 8–16)
ANION GAP SERPL CALC-SCNC: 12 MMOL/L (ref 8–16)
ANION GAP SERPL CALC-SCNC: 13 MMOL/L (ref 8–16)
ANION GAP SERPL CALC-SCNC: 14 MMOL/L
ANION GAP SERPL CALC-SCNC: 14 MMOL/L (ref 8–16)
ANION GAP SERPL CALC-SCNC: 15 MMOL/L
ANION GAP SERPL CALC-SCNC: 15 MMOL/L
ANION GAP SERPL CALC-SCNC: 15 MMOL/L (ref 8–16)
ANION GAP SERPL CALC-SCNC: 16 MMOL/L
ANION GAP SERPL CALC-SCNC: 16 MMOL/L
ANION GAP SERPL CALC-SCNC: 16 MMOL/L (ref 8–16)
ANION GAP SERPL CALC-SCNC: 17 MMOL/L
ANION GAP SERPL CALC-SCNC: 17 MMOL/L
ANION GAP SERPL CALC-SCNC: 17 MMOL/L (ref 8–16)
ANION GAP SERPL CALC-SCNC: 18 MMOL/L (ref 8–16)
ANION GAP SERPL CALC-SCNC: 20 MMOL/L (ref 8–16)
ANION GAP SERPL CALC-SCNC: 21 MMOL/L (ref 8–16)
ANION GAP SERPL CALC-SCNC: 22 MMOL/L (ref 8–16)
ANION GAP SERPL CALC-SCNC: 23 MMOL/L (ref 8–16)
ANION GAP SERPL CALC-SCNC: 6 MMOL/L (ref 8–16)
ANION GAP SERPL CALC-SCNC: 6 MMOL/L (ref 8–16)
ANION GAP SERPL CALC-SCNC: 7 MMOL/L (ref 8–16)
ANION GAP SERPL CALC-SCNC: 7 MMOL/L (ref 8–16)
ANION GAP SERPL CALC-SCNC: 8 MMOL/L (ref 8–16)
ANION GAP SERPL CALC-SCNC: 9 MMOL/L (ref 8–16)
ANISOCYTOSIS BLD QL SMEAR: ABNORMAL
ANISOCYTOSIS BLD QL SMEAR: SLIGHT
ANNOTATION COMMENT IMP: NORMAL
APPEARANCE FLD: NORMAL
APTT BLDCRRT: 101.9 SEC (ref 21–32)
APTT BLDCRRT: 105.3 SEC (ref 21–32)
APTT BLDCRRT: 24.1 SEC (ref 21–32)
APTT BLDCRRT: 33.3 SEC (ref 21–32)
APTT BLDCRRT: 34.5 SEC (ref 21–32)
APTT BLDCRRT: 35.3 SEC (ref 21–32)
APTT BLDCRRT: 36.5 SEC (ref 21–32)
APTT BLDCRRT: 37.7 SEC
APTT BLDCRRT: 38.3 SEC (ref 21–32)
APTT BLDCRRT: 39.5 SEC (ref 21–32)
APTT BLDCRRT: 39.7 SEC (ref 21–32)
APTT BLDCRRT: 39.9 SEC (ref 21–32)
APTT BLDCRRT: 40.1 SEC (ref 21–32)
APTT BLDCRRT: 40.6 SEC (ref 21–32)
APTT BLDCRRT: 42.8 SEC (ref 21–32)
APTT BLDCRRT: 43.2 SEC (ref 21–32)
APTT BLDCRRT: 43.7 SEC (ref 21–32)
APTT BLDCRRT: 43.8 SEC (ref 21–32)
APTT BLDCRRT: 45.5 SEC (ref 21–32)
APTT BLDCRRT: 46 SEC (ref 21–32)
APTT BLDCRRT: 46.4 SEC (ref 21–32)
APTT BLDCRRT: 49 SEC (ref 21–32)
APTT BLDCRRT: 52.7 SEC (ref 21–32)
APTT BLDCRRT: 54.4 SEC (ref 21–32)
APTT BLDCRRT: 54.5 SEC (ref 21–32)
APTT BLDCRRT: 54.6 SEC (ref 21–32)
APTT BLDCRRT: 55.4 SEC (ref 21–32)
APTT BLDCRRT: 56.6 SEC (ref 21–32)
APTT BLDCRRT: 60.1 SEC (ref 21–32)
APTT BLDCRRT: 68.5 SEC (ref 21–32)
APTT BLDCRRT: 84.2 SEC (ref 21–32)
ASCENDING AORTA: 3.11 CM
ASCENDING AORTA: 3.41 CM
ASCENDING AORTA: 3.61 CM
ASCENDING AORTA: 3.62 CM
AST SERPL-CCNC: 105 U/L (ref 10–40)
AST SERPL-CCNC: 157 U/L (ref 10–40)
AST SERPL-CCNC: 169 U/L (ref 10–40)
AST SERPL-CCNC: 17 U/L (ref 10–40)
AST SERPL-CCNC: 18 U/L (ref 10–40)
AST SERPL-CCNC: 187 U/L (ref 10–40)
AST SERPL-CCNC: 19 U/L (ref 10–40)
AST SERPL-CCNC: 199 U/L (ref 10–40)
AST SERPL-CCNC: 22 U/L (ref 10–40)
AST SERPL-CCNC: 23 U/L
AST SERPL-CCNC: 23 U/L (ref 10–40)
AST SERPL-CCNC: 24 U/L (ref 10–40)
AST SERPL-CCNC: 243 U/L (ref 10–40)
AST SERPL-CCNC: 25 U/L (ref 10–40)
AST SERPL-CCNC: 25 U/L (ref 10–40)
AST SERPL-CCNC: 26 U/L (ref 10–40)
AST SERPL-CCNC: 27 U/L (ref 10–40)
AST SERPL-CCNC: 28 U/L (ref 10–40)
AST SERPL-CCNC: 28 U/L (ref 10–40)
AST SERPL-CCNC: 29 U/L (ref 10–40)
AST SERPL-CCNC: 30 U/L (ref 10–40)
AST SERPL-CCNC: 302 U/L (ref 10–40)
AST SERPL-CCNC: 31 U/L
AST SERPL-CCNC: 310 U/L (ref 10–40)
AST SERPL-CCNC: 32 U/L (ref 10–40)
AST SERPL-CCNC: 323 U/L (ref 10–40)
AST SERPL-CCNC: 33 U/L (ref 10–40)
AST SERPL-CCNC: 332 U/L (ref 10–40)
AST SERPL-CCNC: 34 U/L (ref 10–40)
AST SERPL-CCNC: 34 U/L (ref 10–40)
AST SERPL-CCNC: 350 U/L (ref 10–40)
AST SERPL-CCNC: 36 U/L (ref 10–40)
AST SERPL-CCNC: 37 U/L (ref 10–40)
AST SERPL-CCNC: 38 U/L (ref 10–40)
AST SERPL-CCNC: 38 U/L (ref 10–40)
AST SERPL-CCNC: 39 U/L (ref 10–40)
AST SERPL-CCNC: 40 U/L (ref 10–40)
AST SERPL-CCNC: 40 U/L (ref 10–40)
AST SERPL-CCNC: 42 U/L (ref 10–40)
AST SERPL-CCNC: 45 U/L (ref 10–40)
AST SERPL-CCNC: 45 U/L (ref 10–40)
AST SERPL-CCNC: 47 U/L (ref 10–40)
AST SERPL-CCNC: 48 U/L (ref 10–40)
AST SERPL-CCNC: 49 U/L (ref 10–40)
AST SERPL-CCNC: 49 U/L (ref 10–40)
AST SERPL-CCNC: 51 U/L (ref 10–40)
AST SERPL-CCNC: 52 U/L (ref 10–40)
AST SERPL-CCNC: 53 U/L (ref 10–40)
AST SERPL-CCNC: 55 U/L (ref 10–40)
AST SERPL-CCNC: 57 U/L (ref 10–40)
AST SERPL-CCNC: 73 U/L (ref 10–40)
AST SERPL-CCNC: 77 U/L (ref 10–40)
AST SERPL-CCNC: 86 U/L (ref 10–40)
AST SERPL-CCNC: 88 U/L (ref 10–40)
AST SERPL-CCNC: 94 U/L (ref 10–40)
AV INDEX (PROSTH): 0.61
AV INDEX (PROSTH): 0.78
AV INDEX (PROSTH): 0.79
AV INDEX (PROSTH): 0.87
AV MEAN GRADIENT: 1.95 MMHG
AV MEAN GRADIENT: 5 MMHG
AV PEAK GRADIENT: 3.76 MMHG
AV PEAK GRADIENT: 6 MMHG
AV PEAK GRADIENT: 7 MMHG
AV PEAK GRADIENT: 8 MMHG
AV VALVE AREA: 2.45 CM2
AV VALVE AREA: 2.93 CM2
AV VALVE AREA: 3.04 CM2
AV VALVE AREA: 3.69 CM2
AV VELOCITY RATIO: 0.61
AV VELOCITY RATIO: 0.64
AV VELOCITY RATIO: 0.75
AV VELOCITY RATIO: 0.77
B-OH-BUTYR BLD STRIP-SCNC: 1.3 MMOL/L (ref 0–0.5)
BACTERIA BLD CULT: NORMAL
BACTERIA SPEC AEROBE CULT: ABNORMAL
BACTERIA SPEC AEROBE CULT: NORMAL
BACTERIA SPEC AEROBE CULT: NORMAL
BASO STIPL BLD QL SMEAR: ABNORMAL
BASOPHILS # BLD AUTO: 0.01 K/UL (ref 0–0.2)
BASOPHILS # BLD AUTO: 0.02 K/UL
BASOPHILS # BLD AUTO: 0.02 K/UL
BASOPHILS # BLD AUTO: 0.02 K/UL (ref 0–0.2)
BASOPHILS # BLD AUTO: 0.03 K/UL
BASOPHILS # BLD AUTO: 0.03 K/UL
BASOPHILS # BLD AUTO: 0.03 K/UL (ref 0–0.2)
BASOPHILS # BLD AUTO: 0.04 K/UL
BASOPHILS # BLD AUTO: 0.04 K/UL (ref 0–0.2)
BASOPHILS # BLD AUTO: 0.05 K/UL
BASOPHILS # BLD AUTO: 0.05 K/UL (ref 0–0.2)
BASOPHILS # BLD AUTO: 0.06 K/UL (ref 0–0.2)
BASOPHILS # BLD AUTO: 0.07 K/UL
BASOPHILS # BLD AUTO: 0.07 K/UL (ref 0–0.2)
BASOPHILS # BLD AUTO: 0.07 K/UL (ref 0–0.2)
BASOPHILS # BLD AUTO: 0.09 K/UL (ref 0–0.2)
BASOPHILS # BLD AUTO: 0.09 K/UL (ref 0–0.2)
BASOPHILS # BLD AUTO: 0.13 K/UL (ref 0–0.2)
BASOPHILS # BLD AUTO: 0.15 K/UL (ref 0–0.2)
BASOPHILS # BLD AUTO: ABNORMAL K/UL (ref 0–0.2)
BASOPHILS NFR BLD: 0 % (ref 0–1.9)
BASOPHILS NFR BLD: 0.1 % (ref 0–1.9)
BASOPHILS NFR BLD: 0.2 %
BASOPHILS NFR BLD: 0.2 % (ref 0–1.9)
BASOPHILS NFR BLD: 0.3 %
BASOPHILS NFR BLD: 0.3 % (ref 0–1.9)
BASOPHILS NFR BLD: 0.4 %
BASOPHILS NFR BLD: 0.4 % (ref 0–1.9)
BASOPHILS NFR BLD: 0.5 % (ref 0–1.9)
BASOPHILS NFR BLD: 0.6 % (ref 0–1.9)
BASOPHILS NFR BLD: 0.7 % (ref 0–1.9)
BASOPHILS NFR BLD: 0.9 % (ref 0–1.9)
BASOPHILS NFR BLD: 1 % (ref 0–1.9)
BASOPHILS NFR BLD: 1 % (ref 0–1.9)
BILIRUB SERPL-MCNC: 0.4 MG/DL (ref 0.1–1)
BILIRUB SERPL-MCNC: 0.5 MG/DL (ref 0.1–1)
BILIRUB SERPL-MCNC: 0.6 MG/DL
BILIRUB SERPL-MCNC: 0.6 MG/DL (ref 0.1–1)
BILIRUB SERPL-MCNC: 0.7 MG/DL
BILIRUB SERPL-MCNC: 0.7 MG/DL (ref 0.1–1)
BILIRUB SERPL-MCNC: 0.8 MG/DL (ref 0.1–1)
BILIRUB SERPL-MCNC: 0.9 MG/DL (ref 0.1–1)
BILIRUB SERPL-MCNC: 1 MG/DL (ref 0.1–1)
BILIRUB SERPL-MCNC: 1.1 MG/DL (ref 0.1–1)
BILIRUB SERPL-MCNC: 1.1 MG/DL (ref 0.1–1)
BILIRUB SERPL-MCNC: 1.2 MG/DL (ref 0.1–1)
BILIRUB SERPL-MCNC: 1.3 MG/DL (ref 0.1–1)
BILIRUB SERPL-MCNC: 1.4 MG/DL (ref 0.1–1)
BILIRUB SERPL-MCNC: 1.5 MG/DL (ref 0.1–1)
BILIRUB SERPL-MCNC: 1.6 MG/DL (ref 0.1–1)
BILIRUB SERPL-MCNC: 1.7 MG/DL (ref 0.1–1)
BILIRUB SERPL-MCNC: 2.4 MG/DL (ref 0.1–1)
BILIRUB SERPL-MCNC: 2.7 MG/DL (ref 0.1–1)
BILIRUB SERPL-MCNC: 3.3 MG/DL (ref 0.1–1)
BLD GP AB SCN CELLS X3 SERPL QL: NORMAL
BLD PROD TYP BPU: NORMAL
BLOOD UNIT EXPIRATION DATE: NORMAL
BLOOD UNIT TYPE CODE: 5100
BLOOD UNIT TYPE: NORMAL
BNP SERPL-MCNC: 1464 PG/ML (ref 0–99)
BNP SERPL-MCNC: 1964 PG/ML (ref 0–99)
BNP SERPL-MCNC: 3209 PG/ML (ref 0–99)
BNP SERPL-MCNC: 399 PG/ML
BNP SERPL-MCNC: 457 PG/ML (ref 0–99)
BNP SERPL-MCNC: 705 PG/ML
BNP SERPL-MCNC: 812 PG/ML (ref 0–99)
BNP SERPL-MCNC: 946 PG/ML (ref 0–99)
BODY FLD TYPE: NORMAL
BODY FLUID COMMENTS: NORMAL
BSA FOR ECHO PROCEDURE: 2.28 M2
BSA FOR ECHO PROCEDURE: 2.29 M2
BSA FOR ECHO PROCEDURE: 2.31 M2
BSA FOR ECHO PROCEDURE: 2.39 M2
BUN SERPL-MCNC: 10 MG/DL (ref 6–20)
BUN SERPL-MCNC: 11 MG/DL (ref 6–20)
BUN SERPL-MCNC: 12 MG/DL (ref 6–20)
BUN SERPL-MCNC: 13 MG/DL (ref 6–20)
BUN SERPL-MCNC: 13 MG/DL (ref 6–20)
BUN SERPL-MCNC: 14 MG/DL (ref 6–20)
BUN SERPL-MCNC: 14 MG/DL (ref 6–20)
BUN SERPL-MCNC: 15 MG/DL (ref 6–20)
BUN SERPL-MCNC: 15 MG/DL (ref 6–20)
BUN SERPL-MCNC: 16 MG/DL (ref 6–20)
BUN SERPL-MCNC: 18 MG/DL (ref 6–20)
BUN SERPL-MCNC: 19 MG/DL (ref 6–20)
BUN SERPL-MCNC: 20 MG/DL (ref 6–20)
BUN SERPL-MCNC: 21 MG/DL (ref 6–20)
BUN SERPL-MCNC: 23 MG/DL (ref 6–20)
BUN SERPL-MCNC: 23 MG/DL (ref 6–20)
BUN SERPL-MCNC: 24 MG/DL (ref 6–20)
BUN SERPL-MCNC: 24 MG/DL (ref 6–30)
BUN SERPL-MCNC: 25 MG/DL (ref 6–20)
BUN SERPL-MCNC: 26 MG/DL (ref 6–20)
BUN SERPL-MCNC: 27 MG/DL (ref 6–20)
BUN SERPL-MCNC: 28 MG/DL (ref 6–20)
BUN SERPL-MCNC: 28 MG/DL (ref 6–20)
BUN SERPL-MCNC: 29 MG/DL (ref 6–20)
BUN SERPL-MCNC: 3 MG/DL (ref 6–20)
BUN SERPL-MCNC: 30 MG/DL (ref 6–20)
BUN SERPL-MCNC: 31 MG/DL (ref 6–20)
BUN SERPL-MCNC: 31 MG/DL (ref 6–20)
BUN SERPL-MCNC: 32 MG/DL (ref 6–20)
BUN SERPL-MCNC: 32 MG/DL (ref 6–20)
BUN SERPL-MCNC: 34 MG/DL (ref 6–20)
BUN SERPL-MCNC: 35 MG/DL (ref 6–20)
BUN SERPL-MCNC: 35 MG/DL (ref 6–20)
BUN SERPL-MCNC: 36 MG/DL (ref 6–20)
BUN SERPL-MCNC: 36 MG/DL (ref 6–20)
BUN SERPL-MCNC: 37 MG/DL
BUN SERPL-MCNC: 38 MG/DL
BUN SERPL-MCNC: 38 MG/DL (ref 6–20)
BUN SERPL-MCNC: 39 MG/DL
BUN SERPL-MCNC: 39 MG/DL
BUN SERPL-MCNC: 39 MG/DL (ref 6–20)
BUN SERPL-MCNC: 4 MG/DL (ref 6–20)
BUN SERPL-MCNC: 40 MG/DL (ref 6–20)
BUN SERPL-MCNC: 42 MG/DL (ref 6–20)
BUN SERPL-MCNC: 43 MG/DL (ref 6–20)
BUN SERPL-MCNC: 44 MG/DL (ref 6–20)
BUN SERPL-MCNC: 47 MG/DL (ref 6–20)
BUN SERPL-MCNC: 48 MG/DL (ref 6–20)
BUN SERPL-MCNC: 49 MG/DL (ref 6–20)
BUN SERPL-MCNC: 5 MG/DL (ref 6–20)
BUN SERPL-MCNC: 50 MG/DL (ref 6–20)
BUN SERPL-MCNC: 50 MG/DL (ref 6–20)
BUN SERPL-MCNC: 51 MG/DL (ref 6–20)
BUN SERPL-MCNC: 53 MG/DL
BUN SERPL-MCNC: 53 MG/DL (ref 6–20)
BUN SERPL-MCNC: 55 MG/DL (ref 6–20)
BUN SERPL-MCNC: 55 MG/DL (ref 6–20)
BUN SERPL-MCNC: 59 MG/DL (ref 6–20)
BUN SERPL-MCNC: 6 MG/DL (ref 6–20)
BUN SERPL-MCNC: 60 MG/DL (ref 6–20)
BUN SERPL-MCNC: 68 MG/DL
BUN SERPL-MCNC: 7 MG/DL (ref 6–20)
BUN SERPL-MCNC: 70 MG/DL (ref 6–20)
BUN SERPL-MCNC: 70 MG/DL (ref 6–20)
BUN SERPL-MCNC: 74 MG/DL (ref 6–20)
BUN SERPL-MCNC: 77 MG/DL (ref 6–20)
BUN SERPL-MCNC: 77 MG/DL (ref 6–20)
BUN SERPL-MCNC: 78 MG/DL
BUN SERPL-MCNC: 8 MG/DL (ref 6–20)
BUN SERPL-MCNC: 86 MG/DL (ref 6–20)
BUN SERPL-MCNC: 9 MG/DL (ref 6–20)
BUN SERPL-MCNC: 9 MG/DL (ref 6–20)
BURR CELLS BLD QL SMEAR: ABNORMAL
CALCIUM SERPL-MCNC: 6.5 MG/DL (ref 8.7–10.5)
CALCIUM SERPL-MCNC: 7.1 MG/DL (ref 8.7–10.5)
CALCIUM SERPL-MCNC: 7.2 MG/DL (ref 8.7–10.5)
CALCIUM SERPL-MCNC: 7.2 MG/DL (ref 8.7–10.5)
CALCIUM SERPL-MCNC: 7.3 MG/DL
CALCIUM SERPL-MCNC: 7.4 MG/DL
CALCIUM SERPL-MCNC: 7.4 MG/DL (ref 8.7–10.5)
CALCIUM SERPL-MCNC: 7.5 MG/DL (ref 8.7–10.5)
CALCIUM SERPL-MCNC: 7.6 MG/DL (ref 8.7–10.5)
CALCIUM SERPL-MCNC: 7.7 MG/DL
CALCIUM SERPL-MCNC: 7.7 MG/DL (ref 8.7–10.5)
CALCIUM SERPL-MCNC: 7.8 MG/DL
CALCIUM SERPL-MCNC: 7.8 MG/DL (ref 8.7–10.5)
CALCIUM SERPL-MCNC: 7.9 MG/DL (ref 8.7–10.5)
CALCIUM SERPL-MCNC: 8 MG/DL (ref 8.7–10.5)
CALCIUM SERPL-MCNC: 8.1 MG/DL (ref 8.7–10.5)
CALCIUM SERPL-MCNC: 8.2 MG/DL (ref 8.7–10.5)
CALCIUM SERPL-MCNC: 8.3 MG/DL (ref 8.7–10.5)
CALCIUM SERPL-MCNC: 8.4 MG/DL
CALCIUM SERPL-MCNC: 8.4 MG/DL (ref 8.7–10.5)
CALCIUM SERPL-MCNC: 8.5 MG/DL
CALCIUM SERPL-MCNC: 8.5 MG/DL (ref 8.7–10.5)
CALCIUM SERPL-MCNC: 8.6 MG/DL (ref 8.7–10.5)
CALCIUM SERPL-MCNC: 8.7 MG/DL
CALCIUM SERPL-MCNC: 8.7 MG/DL (ref 8.7–10.5)
CALCIUM SERPL-MCNC: 8.8 MG/DL (ref 8.7–10.5)
CALCIUM SERPL-MCNC: 8.9 MG/DL (ref 8.7–10.5)
CALCIUM SERPL-MCNC: 9 MG/DL (ref 8.7–10.5)
CALCIUM SERPL-MCNC: 9 MG/DL (ref 8.7–10.5)
CALCIUM SERPL-MCNC: 9.1 MG/DL (ref 8.7–10.5)
CALCIUM SERPL-MCNC: 9.2 MG/DL (ref 8.7–10.5)
CALCIUM SERPL-MCNC: 9.2 MG/DL (ref 8.7–10.5)
CALCIUM SERPL-MCNC: 9.3 MG/DL (ref 8.7–10.5)
CALCIUM SERPL-MCNC: 9.4 MG/DL (ref 8.7–10.5)
CHLORIDE SERPL-SCNC: 100 MMOL/L
CHLORIDE SERPL-SCNC: 100 MMOL/L (ref 95–110)
CHLORIDE SERPL-SCNC: 101 MMOL/L (ref 95–110)
CHLORIDE SERPL-SCNC: 102 MMOL/L (ref 95–110)
CHLORIDE SERPL-SCNC: 103 MMOL/L (ref 95–110)
CHLORIDE SERPL-SCNC: 104 MMOL/L (ref 95–110)
CHLORIDE SERPL-SCNC: 105 MMOL/L (ref 95–110)
CHLORIDE SERPL-SCNC: 106 MMOL/L (ref 95–110)
CHLORIDE SERPL-SCNC: 107 MMOL/L (ref 95–110)
CHLORIDE SERPL-SCNC: 108 MMOL/L (ref 95–110)
CHLORIDE SERPL-SCNC: 110 MMOL/L (ref 95–110)
CHLORIDE SERPL-SCNC: 111 MMOL/L (ref 95–110)
CHLORIDE SERPL-SCNC: 85 MMOL/L (ref 95–110)
CHLORIDE SERPL-SCNC: 85 MMOL/L (ref 95–110)
CHLORIDE SERPL-SCNC: 86 MMOL/L (ref 95–110)
CHLORIDE SERPL-SCNC: 87 MMOL/L (ref 95–110)
CHLORIDE SERPL-SCNC: 88 MMOL/L (ref 95–110)
CHLORIDE SERPL-SCNC: 89 MMOL/L (ref 95–110)
CHLORIDE SERPL-SCNC: 90 MMOL/L (ref 95–110)
CHLORIDE SERPL-SCNC: 91 MMOL/L (ref 95–110)
CHLORIDE SERPL-SCNC: 92 MMOL/L (ref 95–110)
CHLORIDE SERPL-SCNC: 93 MMOL/L
CHLORIDE SERPL-SCNC: 93 MMOL/L
CHLORIDE SERPL-SCNC: 93 MMOL/L (ref 95–110)
CHLORIDE SERPL-SCNC: 94 MMOL/L
CHLORIDE SERPL-SCNC: 94 MMOL/L (ref 95–110)
CHLORIDE SERPL-SCNC: 95 MMOL/L (ref 95–110)
CHLORIDE SERPL-SCNC: 96 MMOL/L (ref 95–110)
CHLORIDE SERPL-SCNC: 97 MMOL/L
CHLORIDE SERPL-SCNC: 97 MMOL/L (ref 95–110)
CHLORIDE SERPL-SCNC: 98 MMOL/L
CHLORIDE SERPL-SCNC: 98 MMOL/L (ref 95–110)
CHLORIDE SERPL-SCNC: 99 MMOL/L
CHLORIDE SERPL-SCNC: 99 MMOL/L (ref 95–110)
CK SERPL-CCNC: 9 U/L (ref 20–180)
CO2 SERPL-SCNC: 15 MMOL/L (ref 23–29)
CO2 SERPL-SCNC: 15 MMOL/L (ref 23–29)
CO2 SERPL-SCNC: 16 MMOL/L (ref 23–29)
CO2 SERPL-SCNC: 17 MMOL/L (ref 23–29)
CO2 SERPL-SCNC: 18 MMOL/L
CO2 SERPL-SCNC: 18 MMOL/L (ref 23–29)
CO2 SERPL-SCNC: 19 MMOL/L
CO2 SERPL-SCNC: 19 MMOL/L (ref 23–29)
CO2 SERPL-SCNC: 20 MMOL/L
CO2 SERPL-SCNC: 20 MMOL/L (ref 23–29)
CO2 SERPL-SCNC: 21 MMOL/L
CO2 SERPL-SCNC: 21 MMOL/L (ref 23–29)
CO2 SERPL-SCNC: 22 MMOL/L (ref 23–29)
CO2 SERPL-SCNC: 23 MMOL/L
CO2 SERPL-SCNC: 23 MMOL/L (ref 23–29)
CO2 SERPL-SCNC: 24 MMOL/L (ref 23–29)
CO2 SERPL-SCNC: 25 MMOL/L
CO2 SERPL-SCNC: 25 MMOL/L (ref 23–29)
CO2 SERPL-SCNC: 26 MMOL/L
CO2 SERPL-SCNC: 26 MMOL/L (ref 23–29)
CO2 SERPL-SCNC: 27 MMOL/L (ref 23–29)
CO2 SERPL-SCNC: 28 MMOL/L (ref 23–29)
CO2 SERPL-SCNC: 29 MMOL/L (ref 23–29)
CO2 SERPL-SCNC: 30 MMOL/L (ref 23–29)
CO2 SERPL-SCNC: 31 MMOL/L (ref 23–29)
CODING SYSTEM: NORMAL
COLOR FLD: COLORLESS
CREAT SERPL-MCNC: 0.4 MG/DL (ref 0.5–1.4)
CREAT SERPL-MCNC: 0.5 MG/DL (ref 0.5–1.4)
CREAT SERPL-MCNC: 0.6 MG/DL (ref 0.5–1.4)
CREAT SERPL-MCNC: 0.7 MG/DL (ref 0.5–1.4)
CREAT SERPL-MCNC: 0.8 MG/DL (ref 0.5–1.4)
CREAT SERPL-MCNC: 0.9 MG/DL (ref 0.5–1.4)
CREAT SERPL-MCNC: 1 MG/DL (ref 0.5–1.4)
CREAT SERPL-MCNC: 1.1 MG/DL (ref 0.5–1.4)
CREAT SERPL-MCNC: 1.2 MG/DL (ref 0.5–1.4)
CREAT SERPL-MCNC: 1.4 MG/DL (ref 0.5–1.4)
CREAT SERPL-MCNC: 1.5 MG/DL (ref 0.5–1.4)
CREAT SERPL-MCNC: 1.6 MG/DL (ref 0.5–1.4)
CREAT SERPL-MCNC: 1.8 MG/DL (ref 0.5–1.4)
CREAT SERPL-MCNC: 2 MG/DL (ref 0.5–1.4)
CREAT SERPL-MCNC: 2.1 MG/DL (ref 0.5–1.4)
CREAT SERPL-MCNC: 2.2 MG/DL (ref 0.5–1.4)
CREAT SERPL-MCNC: 2.4 MG/DL (ref 0.5–1.4)
CREAT SERPL-MCNC: 2.5 MG/DL (ref 0.5–1.4)
CREAT SERPL-MCNC: 2.8 MG/DL (ref 0.5–1.4)
CREAT SERPL-MCNC: 2.9 MG/DL (ref 0.5–1.4)
CREAT SERPL-MCNC: 2.9 MG/DL (ref 0.5–1.4)
CREAT SERPL-MCNC: 3 MG/DL (ref 0.5–1.4)
CREAT SERPL-MCNC: 3 MG/DL (ref 0.5–1.4)
CREAT SERPL-MCNC: 3.3 MG/DL (ref 0.5–1.4)
CREAT SERPL-MCNC: 3.4 MG/DL (ref 0.5–1.4)
CREAT SERPL-MCNC: 3.6 MG/DL (ref 0.5–1.4)
CREAT SERPL-MCNC: 3.6 MG/DL (ref 0.5–1.4)
CREAT SERPL-MCNC: 3.7 MG/DL (ref 0.5–1.4)
CREAT SERPL-MCNC: 3.7 MG/DL (ref 0.5–1.4)
CREAT SERPL-MCNC: 3.8 MG/DL (ref 0.5–1.4)
CREAT SERPL-MCNC: 3.8 MG/DL (ref 0.5–1.4)
CREAT SERPL-MCNC: 3.9 MG/DL (ref 0.5–1.4)
CREAT SERPL-MCNC: 4.3 MG/DL (ref 0.5–1.4)
CREAT SERPL-MCNC: 4.3 MG/DL (ref 0.5–1.4)
CREAT SERPL-MCNC: 4.4 MG/DL (ref 0.5–1.4)
CREAT SERPL-MCNC: 4.6 MG/DL (ref 0.5–1.4)
CREAT SERPL-MCNC: 4.7 MG/DL (ref 0.5–1.4)
CREAT SERPL-MCNC: 4.7 MG/DL (ref 0.5–1.4)
CREAT SERPL-MCNC: 4.8 MG/DL (ref 0.5–1.4)
CREAT SERPL-MCNC: 5 MG/DL (ref 0.5–1.4)
CREAT SERPL-MCNC: 5 MG/DL (ref 0.5–1.4)
CREAT SERPL-MCNC: 5.1 MG/DL
CREAT SERPL-MCNC: 5.1 MG/DL (ref 0.5–1.4)
CREAT SERPL-MCNC: 5.2 MG/DL (ref 0.5–1.4)
CREAT SERPL-MCNC: 5.3 MG/DL (ref 0.5–1.4)
CREAT SERPL-MCNC: 5.3 MG/DL (ref 0.5–1.4)
CREAT SERPL-MCNC: 5.5 MG/DL (ref 0.5–1.4)
CREAT SERPL-MCNC: 5.5 MG/DL (ref 0.5–1.4)
CREAT SERPL-MCNC: 5.7 MG/DL
CREAT SERPL-MCNC: 5.7 MG/DL (ref 0.5–1.4)
CREAT SERPL-MCNC: 5.7 MG/DL (ref 0.5–1.4)
CREAT SERPL-MCNC: 5.8 MG/DL (ref 0.5–1.4)
CREAT SERPL-MCNC: 5.8 MG/DL (ref 0.5–1.4)
CREAT SERPL-MCNC: 5.9 MG/DL (ref 0.5–1.4)
CREAT SERPL-MCNC: 6.1 MG/DL (ref 0.5–1.4)
CREAT SERPL-MCNC: 6.3 MG/DL
CREAT SERPL-MCNC: 6.3 MG/DL (ref 0.5–1.4)
CREAT SERPL-MCNC: 6.3 MG/DL (ref 0.5–1.4)
CREAT SERPL-MCNC: 6.4 MG/DL (ref 0.5–1.4)
CREAT SERPL-MCNC: 6.6 MG/DL (ref 0.5–1.4)
CREAT SERPL-MCNC: 6.7 MG/DL
CREAT SERPL-MCNC: 6.7 MG/DL (ref 0.5–1.4)
CREAT SERPL-MCNC: 6.8 MG/DL (ref 0.5–1.4)
CREAT SERPL-MCNC: 6.9 MG/DL (ref 0.5–1.4)
CREAT SERPL-MCNC: 7 MG/DL (ref 0.5–1.4)
CREAT SERPL-MCNC: 7.7 MG/DL (ref 0.5–1.4)
CREAT SERPL-MCNC: 7.7 MG/DL (ref 0.5–1.4)
CREAT SERPL-MCNC: 7.9 MG/DL
CREAT SERPL-MCNC: 8.2 MG/DL (ref 0.5–1.4)
CREAT SERPL-MCNC: 8.9 MG/DL
CREAT SERPL-MCNC: 9.4 MG/DL
CV ECHO LV RWT: 0.26 CM
CV ECHO LV RWT: 0.3 CM
CV ECHO LV RWT: 0.43 CM
CV ECHO LV RWT: 0.45 CM
DACRYOCYTES BLD QL SMEAR: ABNORMAL
DELSYS: ABNORMAL
DIFFERENTIAL METHOD: ABNORMAL
DISPENSE STATUS: NORMAL
DOHLE BOD BLD QL SMEAR: PRESENT
DOP CALC AO PEAK VEL: 0.97 M/S
DOP CALC AO PEAK VEL: 1.22 M/S
DOP CALC AO PEAK VEL: 1.31 M/S
DOP CALC AO PEAK VEL: 1.37 M/S
DOP CALC AO VTI: 15.57 CM
DOP CALC AO VTI: 16.73 CM
DOP CALC AO VTI: 17.98 CM
DOP CALC AO VTI: 21.5 CM
DOP CALC LVOT AREA: 3.8 CM2
DOP CALC LVOT AREA: 3.9 CM2
DOP CALC LVOT AREA: 4.05 CM2
DOP CALC LVOT AREA: 4.2 CM2
DOP CALC LVOT DIAMETER: 2.19 CM
DOP CALC LVOT DIAMETER: 2.22 CM
DOP CALC LVOT DIAMETER: 2.27 CM
DOP CALC LVOT DIAMETER: 2.32 CM
DOP CALC LVOT PEAK VEL: 0.59 M/S
DOP CALC LVOT PEAK VEL: 0.78 M/S
DOP CALC LVOT PEAK VEL: 0.98 M/S
DOP CALC LVOT PEAK VEL: 1.06 M/S
DOP CALC LVOT STROKE VOLUME: 38.1 CM3
DOP CALC LVOT STROKE VOLUME: 50.84 CM3
DOP CALC LVOT STROKE VOLUME: 63.06 CM3
DOP CALC LVOT STROKE VOLUME: 66.29 CM3
DOP CALCLVOT PEAK VEL VTI: 13.14 CM
DOP CALCLVOT PEAK VEL VTI: 15.69 CM
DOP CALCLVOT PEAK VEL VTI: 16.75 CM
DOP CALCLVOT PEAK VEL VTI: 9.42 CM
E WAVE DECELERATION TIME: 253.26 MSEC
E/A RATIO: 2.15
E/A RATIO: 3.18
ECHO LV POSTERIOR WALL: 0.74 CM (ref 0.6–1.1)
ECHO LV POSTERIOR WALL: 0.89 CM (ref 0.6–1.1)
ECHO LV POSTERIOR WALL: 1.09 CM (ref 0.6–1.1)
ECHO LV POSTERIOR WALL: 1.29 CM (ref 0.6–1.1)
ENTEROVIRUS: POSITIVE
EOSINOPHIL # BLD AUTO: 0 K/UL
EOSINOPHIL # BLD AUTO: 0 K/UL (ref 0–0.5)
EOSINOPHIL # BLD AUTO: 0.1 K/UL
EOSINOPHIL # BLD AUTO: 0.1 K/UL (ref 0–0.5)
EOSINOPHIL # BLD AUTO: ABNORMAL K/UL (ref 0–0.5)
EOSINOPHIL NFR BLD: 0 % (ref 0–8)
EOSINOPHIL NFR BLD: 0.1 % (ref 0–8)
EOSINOPHIL NFR BLD: 0.2 %
EOSINOPHIL NFR BLD: 0.2 % (ref 0–8)
EOSINOPHIL NFR BLD: 0.3 % (ref 0–8)
EOSINOPHIL NFR BLD: 0.3 % (ref 0–8)
EOSINOPHIL NFR BLD: 0.4 %
EOSINOPHIL NFR BLD: 0.5 %
EOSINOPHIL NFR BLD: 0.5 % (ref 0–8)
EOSINOPHIL NFR BLD: 0.6 %
EOSINOPHIL NFR BLD: 0.6 % (ref 0–8)
EOSINOPHIL NFR BLD: 0.7 % (ref 0–8)
EOSINOPHIL NFR BLD: 0.8 % (ref 0–8)
EOSINOPHIL NFR BLD: 0.9 %
EOSINOPHIL NFR BLD: 0.9 % (ref 0–8)
EOSINOPHIL NFR BLD: 1 %
EOSINOPHIL NFR BLD: 1 %
EOSINOPHIL NFR BLD: 1 % (ref 0–8)
EOSINOPHIL NFR BLD: 1 % (ref 0–8)
EOSINOPHIL NFR BLD: 1.7 % (ref 0–8)
EP: 10
EP: 6
ERYTHROCYTE [DISTWIDTH] IN BLOOD BY AUTOMATED COUNT: 16.3 %
ERYTHROCYTE [DISTWIDTH] IN BLOOD BY AUTOMATED COUNT: 16.7 %
ERYTHROCYTE [DISTWIDTH] IN BLOOD BY AUTOMATED COUNT: 17 %
ERYTHROCYTE [DISTWIDTH] IN BLOOD BY AUTOMATED COUNT: 17.2 %
ERYTHROCYTE [DISTWIDTH] IN BLOOD BY AUTOMATED COUNT: 17.3 %
ERYTHROCYTE [DISTWIDTH] IN BLOOD BY AUTOMATED COUNT: 17.3 %
ERYTHROCYTE [DISTWIDTH] IN BLOOD BY AUTOMATED COUNT: 17.6 %
ERYTHROCYTE [DISTWIDTH] IN BLOOD BY AUTOMATED COUNT: 17.8 % (ref 11.5–14.5)
ERYTHROCYTE [DISTWIDTH] IN BLOOD BY AUTOMATED COUNT: 17.9 % (ref 11.5–14.5)
ERYTHROCYTE [DISTWIDTH] IN BLOOD BY AUTOMATED COUNT: 18 % (ref 11.5–14.5)
ERYTHROCYTE [DISTWIDTH] IN BLOOD BY AUTOMATED COUNT: 18.1 % (ref 11.5–14.5)
ERYTHROCYTE [DISTWIDTH] IN BLOOD BY AUTOMATED COUNT: 18.2 % (ref 11.5–14.5)
ERYTHROCYTE [DISTWIDTH] IN BLOOD BY AUTOMATED COUNT: 18.2 % (ref 11.5–14.5)
ERYTHROCYTE [DISTWIDTH] IN BLOOD BY AUTOMATED COUNT: 18.3 % (ref 11.5–14.5)
ERYTHROCYTE [DISTWIDTH] IN BLOOD BY AUTOMATED COUNT: 18.3 % (ref 11.5–14.5)
ERYTHROCYTE [DISTWIDTH] IN BLOOD BY AUTOMATED COUNT: 18.4 % (ref 11.5–14.5)
ERYTHROCYTE [DISTWIDTH] IN BLOOD BY AUTOMATED COUNT: 18.6 % (ref 11.5–14.5)
ERYTHROCYTE [DISTWIDTH] IN BLOOD BY AUTOMATED COUNT: 18.7 % (ref 11.5–14.5)
ERYTHROCYTE [DISTWIDTH] IN BLOOD BY AUTOMATED COUNT: 18.8 % (ref 11.5–14.5)
ERYTHROCYTE [DISTWIDTH] IN BLOOD BY AUTOMATED COUNT: 18.9 % (ref 11.5–14.5)
ERYTHROCYTE [DISTWIDTH] IN BLOOD BY AUTOMATED COUNT: 18.9 % (ref 11.5–14.5)
ERYTHROCYTE [DISTWIDTH] IN BLOOD BY AUTOMATED COUNT: 19.2 % (ref 11.5–14.5)
ERYTHROCYTE [DISTWIDTH] IN BLOOD BY AUTOMATED COUNT: 19.3 % (ref 11.5–14.5)
ERYTHROCYTE [DISTWIDTH] IN BLOOD BY AUTOMATED COUNT: 19.7 % (ref 11.5–14.5)
ERYTHROCYTE [DISTWIDTH] IN BLOOD BY AUTOMATED COUNT: 19.9 % (ref 11.5–14.5)
ERYTHROCYTE [DISTWIDTH] IN BLOOD BY AUTOMATED COUNT: 20.3 % (ref 11.5–14.5)
ERYTHROCYTE [DISTWIDTH] IN BLOOD BY AUTOMATED COUNT: 20.3 % (ref 11.5–14.5)
ERYTHROCYTE [DISTWIDTH] IN BLOOD BY AUTOMATED COUNT: 20.7 % (ref 11.5–14.5)
ERYTHROCYTE [DISTWIDTH] IN BLOOD BY AUTOMATED COUNT: 21.2 % (ref 11.5–14.5)
ERYTHROCYTE [DISTWIDTH] IN BLOOD BY AUTOMATED COUNT: 21.3 % (ref 11.5–14.5)
ERYTHROCYTE [DISTWIDTH] IN BLOOD BY AUTOMATED COUNT: 21.4 % (ref 11.5–14.5)
ERYTHROCYTE [DISTWIDTH] IN BLOOD BY AUTOMATED COUNT: 21.5 % (ref 11.5–14.5)
ERYTHROCYTE [DISTWIDTH] IN BLOOD BY AUTOMATED COUNT: 21.6 % (ref 11.5–14.5)
ERYTHROCYTE [DISTWIDTH] IN BLOOD BY AUTOMATED COUNT: 21.6 % (ref 11.5–14.5)
ERYTHROCYTE [DISTWIDTH] IN BLOOD BY AUTOMATED COUNT: 21.7 % (ref 11.5–14.5)
ERYTHROCYTE [DISTWIDTH] IN BLOOD BY AUTOMATED COUNT: 21.7 % (ref 11.5–14.5)
ERYTHROCYTE [DISTWIDTH] IN BLOOD BY AUTOMATED COUNT: 21.8 % (ref 11.5–14.5)
ERYTHROCYTE [DISTWIDTH] IN BLOOD BY AUTOMATED COUNT: 21.8 % (ref 11.5–14.5)
ERYTHROCYTE [DISTWIDTH] IN BLOOD BY AUTOMATED COUNT: 22 % (ref 11.5–14.5)
ERYTHROCYTE [DISTWIDTH] IN BLOOD BY AUTOMATED COUNT: 22.2 % (ref 11.5–14.5)
ERYTHROCYTE [DISTWIDTH] IN BLOOD BY AUTOMATED COUNT: 22.3 % (ref 11.5–14.5)
ERYTHROCYTE [DISTWIDTH] IN BLOOD BY AUTOMATED COUNT: 22.4 % (ref 11.5–14.5)
ERYTHROCYTE [DISTWIDTH] IN BLOOD BY AUTOMATED COUNT: 22.5 % (ref 11.5–14.5)
ERYTHROCYTE [DISTWIDTH] IN BLOOD BY AUTOMATED COUNT: 22.5 % (ref 11.5–14.5)
ERYTHROCYTE [DISTWIDTH] IN BLOOD BY AUTOMATED COUNT: 22.7 % (ref 11.5–14.5)
ERYTHROCYTE [DISTWIDTH] IN BLOOD BY AUTOMATED COUNT: 23 % (ref 11.5–14.5)
ERYTHROCYTE [DISTWIDTH] IN BLOOD BY AUTOMATED COUNT: 23.3 % (ref 11.5–14.5)
ERYTHROCYTE [DISTWIDTH] IN BLOOD BY AUTOMATED COUNT: 23.3 % (ref 11.5–14.5)
ERYTHROCYTE [DISTWIDTH] IN BLOOD BY AUTOMATED COUNT: 23.5 % (ref 11.5–14.5)
ERYTHROCYTE [DISTWIDTH] IN BLOOD BY AUTOMATED COUNT: 23.9 % (ref 11.5–14.5)
ERYTHROCYTE [DISTWIDTH] IN BLOOD BY AUTOMATED COUNT: 24.3 % (ref 11.5–14.5)
ERYTHROCYTE [DISTWIDTH] IN BLOOD BY AUTOMATED COUNT: 24.4 % (ref 11.5–14.5)
ERYTHROCYTE [DISTWIDTH] IN BLOOD BY AUTOMATED COUNT: 24.6 % (ref 11.5–14.5)
ERYTHROCYTE [DISTWIDTH] IN BLOOD BY AUTOMATED COUNT: 24.8 % (ref 11.5–14.5)
ERYTHROCYTE [DISTWIDTH] IN BLOOD BY AUTOMATED COUNT: 24.8 % (ref 11.5–14.5)
ERYTHROCYTE [DISTWIDTH] IN BLOOD BY AUTOMATED COUNT: 25 % (ref 11.5–14.5)
ERYTHROCYTE [DISTWIDTH] IN BLOOD BY AUTOMATED COUNT: 25.1 % (ref 11.5–14.5)
ERYTHROCYTE [SEDIMENTATION RATE] IN BLOOD BY WESTERGREN METHOD: 14 MM/H
ERYTHROCYTE [SEDIMENTATION RATE] IN BLOOD BY WESTERGREN METHOD: 14 MM/H
ERYTHROCYTE [SEDIMENTATION RATE] IN BLOOD BY WESTERGREN METHOD: 16 MM/H
ERYTHROCYTE [SEDIMENTATION RATE] IN BLOOD BY WESTERGREN METHOD: 18 MM/H
ERYTHROCYTE [SEDIMENTATION RATE] IN BLOOD BY WESTERGREN METHOD: 22 MM/H
EST. GFR  (AFRICAN AMERICAN): 10.1 ML/MIN/1.73 M^2
EST. GFR  (AFRICAN AMERICAN): 10.3 ML/MIN/1.73 M^2
EST. GFR  (AFRICAN AMERICAN): 10.3 ML/MIN/1.73 M^2
EST. GFR  (AFRICAN AMERICAN): 10.8 ML/MIN/1.73 M^2
EST. GFR  (AFRICAN AMERICAN): 11.1 ML/MIN/1.73 M^2
EST. GFR  (AFRICAN AMERICAN): 11.1 ML/MIN/1.73 M^2
EST. GFR  (AFRICAN AMERICAN): 11.4 ML/MIN/1.73 M^2
EST. GFR  (AFRICAN AMERICAN): 12.1 ML/MIN/1.73 M^2
EST. GFR  (AFRICAN AMERICAN): 12.4 ML/MIN/1.73 M^2
EST. GFR  (AFRICAN AMERICAN): 12.4 ML/MIN/1.73 M^2
EST. GFR  (AFRICAN AMERICAN): 13.9 ML/MIN/1.73 M^2
EST. GFR  (AFRICAN AMERICAN): 14.4 ML/MIN/1.73 M^2
EST. GFR  (AFRICAN AMERICAN): 14.4 ML/MIN/1.73 M^2
EST. GFR  (AFRICAN AMERICAN): 14.9 ML/MIN/1.73 M^2
EST. GFR  (AFRICAN AMERICAN): 14.9 ML/MIN/1.73 M^2
EST. GFR  (AFRICAN AMERICAN): 15.4 ML/MIN/1.73 M^2
EST. GFR  (AFRICAN AMERICAN): 15.4 ML/MIN/1.73 M^2
EST. GFR  (AFRICAN AMERICAN): 16.5 ML/MIN/1.73 M^2
EST. GFR  (AFRICAN AMERICAN): 17.1 ML/MIN/1.73 M^2
EST. GFR  (AFRICAN AMERICAN): 19.1 ML/MIN/1.73 M^2
EST. GFR  (AFRICAN AMERICAN): 19.1 ML/MIN/1.73 M^2
EST. GFR  (AFRICAN AMERICAN): 19.9 ML/MIN/1.73 M^2
EST. GFR  (AFRICAN AMERICAN): 19.9 ML/MIN/1.73 M^2
EST. GFR  (AFRICAN AMERICAN): 20.8 ML/MIN/1.73 M^2
EST. GFR  (AFRICAN AMERICAN): 23.9 ML/MIN/1.73 M^2
EST. GFR  (AFRICAN AMERICAN): 25.1 ML/MIN/1.73 M^2
EST. GFR  (AFRICAN AMERICAN): 27.9 ML/MIN/1.73 M^2
EST. GFR  (AFRICAN AMERICAN): 29.5 ML/MIN/1.73 M^2
EST. GFR  (AFRICAN AMERICAN): 31.3 ML/MIN/1.73 M^2
EST. GFR  (AFRICAN AMERICAN): 35.5 ML/MIN/1.73 M^2
EST. GFR  (AFRICAN AMERICAN): 4.8 ML/MIN/1.73 M^2
EST. GFR  (AFRICAN AMERICAN): 40.9 ML/MIN/1.73 M^2
EST. GFR  (AFRICAN AMERICAN): 44.3 ML/MIN/1.73 M^2
EST. GFR  (AFRICAN AMERICAN): 48.1 ML/MIN/1.73 M^2
EST. GFR  (AFRICAN AMERICAN): 5.1 ML/MIN/1.73 M^2
EST. GFR  (AFRICAN AMERICAN): 5.7 ML/MIN/1.73 M^2
EST. GFR  (AFRICAN AMERICAN): 5.9 ML/MIN/1.73 M^2
EST. GFR  (AFRICAN AMERICAN): 58 ML/MIN/1.73 M^2
EST. GFR  (AFRICAN AMERICAN): 6.1 ML/MIN/1.73 M^2
EST. GFR  (AFRICAN AMERICAN): 6.1 ML/MIN/1.73 M^2
EST. GFR  (AFRICAN AMERICAN): 6.9 ML/MIN/1.73 M^2
EST. GFR  (AFRICAN AMERICAN): 7 ML/MIN/1.73 M^2
EST. GFR  (AFRICAN AMERICAN): 7.1 ML/MIN/1.73 M^2
EST. GFR  (AFRICAN AMERICAN): 7.2 ML/MIN/1.73 M^2
EST. GFR  (AFRICAN AMERICAN): 7.2 ML/MIN/1.73 M^2
EST. GFR  (AFRICAN AMERICAN): 7.4 ML/MIN/1.73 M^2
EST. GFR  (AFRICAN AMERICAN): 7.7 ML/MIN/1.73 M^2
EST. GFR  (AFRICAN AMERICAN): 7.8 ML/MIN/1.73 M^2
EST. GFR  (AFRICAN AMERICAN): 8.1 ML/MIN/1.73 M^2
EST. GFR  (AFRICAN AMERICAN): 8.5 ML/MIN/1.73 M^2
EST. GFR  (AFRICAN AMERICAN): 8.6 ML/MIN/1.73 M^2
EST. GFR  (AFRICAN AMERICAN): 8.6 ML/MIN/1.73 M^2
EST. GFR  (AFRICAN AMERICAN): 8.8 ML/MIN/1.73 M^2
EST. GFR  (AFRICAN AMERICAN): 9.2 ML/MIN/1.73 M^2
EST. GFR  (AFRICAN AMERICAN): 9.6 ML/MIN/1.73 M^2
EST. GFR  (AFRICAN AMERICAN): 9.6 ML/MIN/1.73 M^2
EST. GFR  (AFRICAN AMERICAN): 9.8 ML/MIN/1.73 M^2
EST. GFR  (AFRICAN AMERICAN): >60 ML/MIN/1.73 M^2
EST. GFR  (NON AFRICAN AMERICAN): 10.5 ML/MIN/1.73 M^2
EST. GFR  (NON AFRICAN AMERICAN): 10.7 ML/MIN/1.73 M^2
EST. GFR  (NON AFRICAN AMERICAN): 10.7 ML/MIN/1.73 M^2
EST. GFR  (NON AFRICAN AMERICAN): 12.1 ML/MIN/1.73 M^2
EST. GFR  (NON AFRICAN AMERICAN): 12.5 ML/MIN/1.73 M^2
EST. GFR  (NON AFRICAN AMERICAN): 12.5 ML/MIN/1.73 M^2
EST. GFR  (NON AFRICAN AMERICAN): 12.9 ML/MIN/1.73 M^2
EST. GFR  (NON AFRICAN AMERICAN): 12.9 ML/MIN/1.73 M^2
EST. GFR  (NON AFRICAN AMERICAN): 13.3 ML/MIN/1.73 M^2
EST. GFR  (NON AFRICAN AMERICAN): 13.3 ML/MIN/1.73 M^2
EST. GFR  (NON AFRICAN AMERICAN): 14.3 ML/MIN/1.73 M^2
EST. GFR  (NON AFRICAN AMERICAN): 14.8 ML/MIN/1.73 M^2
EST. GFR  (NON AFRICAN AMERICAN): 16.6 ML/MIN/1.73 M^2
EST. GFR  (NON AFRICAN AMERICAN): 16.6 ML/MIN/1.73 M^2
EST. GFR  (NON AFRICAN AMERICAN): 17.3 ML/MIN/1.73 M^2
EST. GFR  (NON AFRICAN AMERICAN): 17.3 ML/MIN/1.73 M^2
EST. GFR  (NON AFRICAN AMERICAN): 18.1 ML/MIN/1.73 M^2
EST. GFR  (NON AFRICAN AMERICAN): 20.7 ML/MIN/1.73 M^2
EST. GFR  (NON AFRICAN AMERICAN): 21.8 ML/MIN/1.73 M^2
EST. GFR  (NON AFRICAN AMERICAN): 24.2 ML/MIN/1.73 M^2
EST. GFR  (NON AFRICAN AMERICAN): 25.6 ML/MIN/1.73 M^2
EST. GFR  (NON AFRICAN AMERICAN): 27.1 ML/MIN/1.73 M^2
EST. GFR  (NON AFRICAN AMERICAN): 30.8 ML/MIN/1.73 M^2
EST. GFR  (NON AFRICAN AMERICAN): 35.5 ML/MIN/1.73 M^2
EST. GFR  (NON AFRICAN AMERICAN): 38.4 ML/MIN/1.73 M^2
EST. GFR  (NON AFRICAN AMERICAN): 4.2 ML/MIN/1.73 M^2
EST. GFR  (NON AFRICAN AMERICAN): 4.5 ML/MIN/1.73 M^2
EST. GFR  (NON AFRICAN AMERICAN): 4.9 ML/MIN/1.73 M^2
EST. GFR  (NON AFRICAN AMERICAN): 41.7 ML/MIN/1.73 M^2
EST. GFR  (NON AFRICAN AMERICAN): 5.2 ML/MIN/1.73 M^2
EST. GFR  (NON AFRICAN AMERICAN): 5.3 ML/MIN/1.73 M^2
EST. GFR  (NON AFRICAN AMERICAN): 5.3 ML/MIN/1.73 M^2
EST. GFR  (NON AFRICAN AMERICAN): 50.3 ML/MIN/1.73 M^2
EST. GFR  (NON AFRICAN AMERICAN): 55.9 ML/MIN/1.73 M^2
EST. GFR  (NON AFRICAN AMERICAN): 6 ML/MIN/1.73 M^2
EST. GFR  (NON AFRICAN AMERICAN): 6.1 ML/MIN/1.73 M^2
EST. GFR  (NON AFRICAN AMERICAN): 6.2 ML/MIN/1.73 M^2
EST. GFR  (NON AFRICAN AMERICAN): 6.3 ML/MIN/1.73 M^2
EST. GFR  (NON AFRICAN AMERICAN): 6.3 ML/MIN/1.73 M^2
EST. GFR  (NON AFRICAN AMERICAN): 6.4 ML/MIN/1.73 M^2
EST. GFR  (NON AFRICAN AMERICAN): 6.6 ML/MIN/1.73 M^2
EST. GFR  (NON AFRICAN AMERICAN): 6.8 ML/MIN/1.73 M^2
EST. GFR  (NON AFRICAN AMERICAN): 7 ML/MIN/1.73 M^2
EST. GFR  (NON AFRICAN AMERICAN): 7.3 ML/MIN/1.73 M^2
EST. GFR  (NON AFRICAN AMERICAN): 7.5 ML/MIN/1.73 M^2
EST. GFR  (NON AFRICAN AMERICAN): 7.5 ML/MIN/1.73 M^2
EST. GFR  (NON AFRICAN AMERICAN): 7.6 ML/MIN/1.73 M^2
EST. GFR  (NON AFRICAN AMERICAN): 8 ML/MIN/1.73 M^2
EST. GFR  (NON AFRICAN AMERICAN): 8.3 ML/MIN/1.73 M^2
EST. GFR  (NON AFRICAN AMERICAN): 8.3 ML/MIN/1.73 M^2
EST. GFR  (NON AFRICAN AMERICAN): 8.5 ML/MIN/1.73 M^2
EST. GFR  (NON AFRICAN AMERICAN): 8.7 ML/MIN/1.73 M^2
EST. GFR  (NON AFRICAN AMERICAN): 9 ML/MIN/1.73 M^2
EST. GFR  (NON AFRICAN AMERICAN): 9 ML/MIN/1.73 M^2
EST. GFR  (NON AFRICAN AMERICAN): 9.4 ML/MIN/1.73 M^2
EST. GFR  (NON AFRICAN AMERICAN): 9.7 ML/MIN/1.73 M^2
EST. GFR  (NON AFRICAN AMERICAN): 9.7 ML/MIN/1.73 M^2
EST. GFR  (NON AFRICAN AMERICAN): 9.9 ML/MIN/1.73 M^2
EST. GFR  (NON AFRICAN AMERICAN): >60 ML/MIN/1.73 M^2
ESTIMATED AVG GLUCOSE: 126 MG/DL (ref 68–131)
ESTIMATED AVG GLUCOSE: 128 MG/DL (ref 68–131)
ESTIMATED AVG GLUCOSE: 131 MG/DL
ESTIMATED AVG GLUCOSE: 140 MG/DL (ref 68–131)
FIO2: 100
FIO2: 100
FIO2: 30
FIO2: 30
FIO2: 35
FIO2: 40
FIO2: 50
FIO2: 50
FLOW: 6
FOLATE SERPL-MCNC: 15.8 NG/ML (ref 4–24)
FRACTIONAL SHORTENING: 19 % (ref 28–44)
FRACTIONAL SHORTENING: 20 % (ref 28–44)
FRACTIONAL SHORTENING: 37 % (ref 28–44)
FRACTIONAL SHORTENING: 7 % (ref 28–44)
GIANT PLATELETS BLD QL SMEAR: PRESENT
GLUCOSE SERPL-MCNC: 102 MG/DL (ref 70–110)
GLUCOSE SERPL-MCNC: 104 MG/DL (ref 70–110)
GLUCOSE SERPL-MCNC: 105 MG/DL
GLUCOSE SERPL-MCNC: 106 MG/DL (ref 70–110)
GLUCOSE SERPL-MCNC: 107 MG/DL (ref 70–110)
GLUCOSE SERPL-MCNC: 108 MG/DL
GLUCOSE SERPL-MCNC: 111 MG/DL (ref 70–110)
GLUCOSE SERPL-MCNC: 112 MG/DL (ref 70–110)
GLUCOSE SERPL-MCNC: 113 MG/DL (ref 70–110)
GLUCOSE SERPL-MCNC: 116 MG/DL (ref 70–110)
GLUCOSE SERPL-MCNC: 118 MG/DL (ref 70–110)
GLUCOSE SERPL-MCNC: 120 MG/DL (ref 70–110)
GLUCOSE SERPL-MCNC: 123 MG/DL (ref 70–110)
GLUCOSE SERPL-MCNC: 124 MG/DL (ref 70–110)
GLUCOSE SERPL-MCNC: 124 MG/DL (ref 70–110)
GLUCOSE SERPL-MCNC: 127 MG/DL (ref 70–110)
GLUCOSE SERPL-MCNC: 127 MG/DL (ref 70–110)
GLUCOSE SERPL-MCNC: 129 MG/DL (ref 70–110)
GLUCOSE SERPL-MCNC: 131 MG/DL (ref 70–110)
GLUCOSE SERPL-MCNC: 131 MG/DL (ref 70–110)
GLUCOSE SERPL-MCNC: 132 MG/DL (ref 70–110)
GLUCOSE SERPL-MCNC: 132 MG/DL (ref 70–110)
GLUCOSE SERPL-MCNC: 134 MG/DL (ref 70–110)
GLUCOSE SERPL-MCNC: 134 MG/DL (ref 70–110)
GLUCOSE SERPL-MCNC: 135 MG/DL (ref 70–110)
GLUCOSE SERPL-MCNC: 136 MG/DL (ref 70–110)
GLUCOSE SERPL-MCNC: 137 MG/DL (ref 70–110)
GLUCOSE SERPL-MCNC: 139 MG/DL (ref 70–110)
GLUCOSE SERPL-MCNC: 142 MG/DL
GLUCOSE SERPL-MCNC: 142 MG/DL (ref 70–110)
GLUCOSE SERPL-MCNC: 144 MG/DL (ref 70–110)
GLUCOSE SERPL-MCNC: 144 MG/DL (ref 70–110)
GLUCOSE SERPL-MCNC: 146 MG/DL (ref 70–110)
GLUCOSE SERPL-MCNC: 147 MG/DL (ref 70–110)
GLUCOSE SERPL-MCNC: 147 MG/DL (ref 70–110)
GLUCOSE SERPL-MCNC: 148 MG/DL (ref 70–110)
GLUCOSE SERPL-MCNC: 149 MG/DL (ref 70–110)
GLUCOSE SERPL-MCNC: 155 MG/DL (ref 70–110)
GLUCOSE SERPL-MCNC: 158 MG/DL (ref 70–110)
GLUCOSE SERPL-MCNC: 159 MG/DL (ref 70–110)
GLUCOSE SERPL-MCNC: 160 MG/DL (ref 70–110)
GLUCOSE SERPL-MCNC: 160 MG/DL (ref 70–110)
GLUCOSE SERPL-MCNC: 163 MG/DL (ref 70–110)
GLUCOSE SERPL-MCNC: 164 MG/DL (ref 70–110)
GLUCOSE SERPL-MCNC: 165 MG/DL (ref 70–110)
GLUCOSE SERPL-MCNC: 165 MG/DL (ref 70–110)
GLUCOSE SERPL-MCNC: 166 MG/DL (ref 70–110)
GLUCOSE SERPL-MCNC: 167 MG/DL (ref 70–110)
GLUCOSE SERPL-MCNC: 168 MG/DL (ref 70–110)
GLUCOSE SERPL-MCNC: 170 MG/DL (ref 70–110)
GLUCOSE SERPL-MCNC: 171 MG/DL (ref 70–110)
GLUCOSE SERPL-MCNC: 173 MG/DL (ref 70–110)
GLUCOSE SERPL-MCNC: 173 MG/DL (ref 70–110)
GLUCOSE SERPL-MCNC: 176 MG/DL (ref 70–110)
GLUCOSE SERPL-MCNC: 178 MG/DL (ref 70–110)
GLUCOSE SERPL-MCNC: 179 MG/DL (ref 70–110)
GLUCOSE SERPL-MCNC: 179 MG/DL (ref 70–110)
GLUCOSE SERPL-MCNC: 183 MG/DL (ref 70–110)
GLUCOSE SERPL-MCNC: 184 MG/DL (ref 70–110)
GLUCOSE SERPL-MCNC: 184 MG/DL (ref 70–110)
GLUCOSE SERPL-MCNC: 186 MG/DL (ref 70–110)
GLUCOSE SERPL-MCNC: 188 MG/DL (ref 70–110)
GLUCOSE SERPL-MCNC: 189 MG/DL (ref 70–110)
GLUCOSE SERPL-MCNC: 190 MG/DL (ref 70–110)
GLUCOSE SERPL-MCNC: 191 MG/DL (ref 70–110)
GLUCOSE SERPL-MCNC: 200 MG/DL (ref 70–110)
GLUCOSE SERPL-MCNC: 200 MG/DL (ref 70–110)
GLUCOSE SERPL-MCNC: 201 MG/DL (ref 70–110)
GLUCOSE SERPL-MCNC: 205 MG/DL (ref 70–110)
GLUCOSE SERPL-MCNC: 208 MG/DL (ref 70–110)
GLUCOSE SERPL-MCNC: 208 MG/DL (ref 70–110)
GLUCOSE SERPL-MCNC: 209 MG/DL (ref 70–110)
GLUCOSE SERPL-MCNC: 211 MG/DL (ref 70–110)
GLUCOSE SERPL-MCNC: 214 MG/DL (ref 70–110)
GLUCOSE SERPL-MCNC: 215 MG/DL (ref 70–110)
GLUCOSE SERPL-MCNC: 215 MG/DL (ref 70–110)
GLUCOSE SERPL-MCNC: 216 MG/DL (ref 70–110)
GLUCOSE SERPL-MCNC: 223 MG/DL (ref 70–110)
GLUCOSE SERPL-MCNC: 224 MG/DL (ref 70–110)
GLUCOSE SERPL-MCNC: 230 MG/DL (ref 70–110)
GLUCOSE SERPL-MCNC: 232 MG/DL (ref 70–110)
GLUCOSE SERPL-MCNC: 242 MG/DL (ref 70–110)
GLUCOSE SERPL-MCNC: 245 MG/DL (ref 70–110)
GLUCOSE SERPL-MCNC: 251 MG/DL (ref 70–110)
GLUCOSE SERPL-MCNC: 251 MG/DL (ref 70–110)
GLUCOSE SERPL-MCNC: 252 MG/DL (ref 70–110)
GLUCOSE SERPL-MCNC: 253 MG/DL (ref 70–110)
GLUCOSE SERPL-MCNC: 253 MG/DL (ref 70–110)
GLUCOSE SERPL-MCNC: 267 MG/DL (ref 70–110)
GLUCOSE SERPL-MCNC: 267 MG/DL (ref 70–110)
GLUCOSE SERPL-MCNC: 273 MG/DL (ref 70–110)
GLUCOSE SERPL-MCNC: 273 MG/DL (ref 70–110)
GLUCOSE SERPL-MCNC: 276 MG/DL (ref 70–110)
GLUCOSE SERPL-MCNC: 55 MG/DL (ref 70–110)
GLUCOSE SERPL-MCNC: 61 MG/DL (ref 70–110)
GLUCOSE SERPL-MCNC: 61 MG/DL (ref 70–110)
GLUCOSE SERPL-MCNC: 68 MG/DL
GLUCOSE SERPL-MCNC: 69 MG/DL (ref 70–110)
GLUCOSE SERPL-MCNC: 73 MG/DL
GLUCOSE SERPL-MCNC: 78 MG/DL
GLUCOSE SERPL-MCNC: 80 MG/DL (ref 70–110)
GLUCOSE SERPL-MCNC: 81 MG/DL (ref 70–110)
GLUCOSE SERPL-MCNC: 87 MG/DL (ref 70–110)
GLUCOSE SERPL-MCNC: 90 MG/DL (ref 70–110)
GLUCOSE SERPL-MCNC: 92 MG/DL (ref 70–110)
GLUCOSE SERPL-MCNC: 94 MG/DL
GLUCOSE SERPL-MCNC: 95 MG/DL (ref 70–110)
GRAM STN SPEC: ABNORMAL
GRAM STN SPEC: NORMAL
HAV IGM SERPL QL IA: NEGATIVE
HBA1C MFR BLD HPLC: 6 % (ref 4–5.6)
HBA1C MFR BLD HPLC: 6.1 % (ref 4–5.6)
HBA1C MFR BLD HPLC: 6.2 %
HBA1C MFR BLD HPLC: 6.5 % (ref 4–5.6)
HBV CORE IGM SERPL QL IA: NEGATIVE
HBV SURFACE AB SER-ACNC: NEGATIVE M[IU]/ML
HBV SURFACE AG SERPL QL IA: NEGATIVE
HBV SURFACE AG SERPL QL IA: NEGATIVE
HCO3 UR-SCNC: 15.3 MMOL/L (ref 24–28)
HCO3 UR-SCNC: 17.5 MMOL/L (ref 24–28)
HCO3 UR-SCNC: 18.9 MMOL/L (ref 24–28)
HCO3 UR-SCNC: 19.2 MMOL/L (ref 24–28)
HCO3 UR-SCNC: 20.4 MMOL/L (ref 24–28)
HCO3 UR-SCNC: 20.9 MMOL/L (ref 24–28)
HCO3 UR-SCNC: 21.1 MMOL/L (ref 24–28)
HCO3 UR-SCNC: 21.4 MMOL/L (ref 24–28)
HCO3 UR-SCNC: 21.4 MMOL/L (ref 24–28)
HCO3 UR-SCNC: 21.6 MMOL/L (ref 24–28)
HCO3 UR-SCNC: 22.4 MMOL/L (ref 24–28)
HCO3 UR-SCNC: 22.4 MMOL/L (ref 24–28)
HCO3 UR-SCNC: 22.7 MMOL/L (ref 24–28)
HCO3 UR-SCNC: 22.9 MMOL/L (ref 24–28)
HCO3 UR-SCNC: 23.6 MMOL/L (ref 24–28)
HCO3 UR-SCNC: 23.7 MMOL/L (ref 24–28)
HCO3 UR-SCNC: 23.7 MMOL/L (ref 24–28)
HCO3 UR-SCNC: 24.3 MMOL/L (ref 24–28)
HCO3 UR-SCNC: 24.4 MMOL/L (ref 24–28)
HCO3 UR-SCNC: 24.5 MMOL/L (ref 24–28)
HCO3 UR-SCNC: 25.5 MMOL/L (ref 24–28)
HCO3 UR-SCNC: 26.9 MMOL/L (ref 24–28)
HCO3 UR-SCNC: 27.5 MMOL/L (ref 24–28)
HCO3 UR-SCNC: 29 MMOL/L (ref 24–28)
HCO3 UR-SCNC: 30.7 MMOL/L (ref 24–28)
HCO3 UR-SCNC: 31 MMOL/L (ref 24–28)
HCO3 UR-SCNC: 32.4 MMOL/L (ref 24–28)
HCO3 UR-SCNC: 33.1 MMOL/L (ref 24–28)
HCO3 UR-SCNC: 33.6 MMOL/L (ref 24–28)
HCO3 UR-SCNC: 35.3 MMOL/L (ref 24–28)
HCO3 UR-SCNC: 38.3 MMOL/L (ref 24–28)
HCT VFR BLD AUTO: 23.7 % (ref 37–48.5)
HCT VFR BLD AUTO: 23.9 % (ref 37–48.5)
HCT VFR BLD AUTO: 24.3 % (ref 37–48.5)
HCT VFR BLD AUTO: 24.6 % (ref 37–48.5)
HCT VFR BLD AUTO: 25 % (ref 37–48.5)
HCT VFR BLD AUTO: 25 % (ref 37–48.5)
HCT VFR BLD AUTO: 25.1 % (ref 37–48.5)
HCT VFR BLD AUTO: 25.2 % (ref 37–48.5)
HCT VFR BLD AUTO: 25.4 % (ref 37–48.5)
HCT VFR BLD AUTO: 25.4 % (ref 37–48.5)
HCT VFR BLD AUTO: 25.5 % (ref 37–48.5)
HCT VFR BLD AUTO: 25.7 % (ref 37–48.5)
HCT VFR BLD AUTO: 25.7 % (ref 37–48.5)
HCT VFR BLD AUTO: 25.9 % (ref 37–48.5)
HCT VFR BLD AUTO: 25.9 % (ref 37–48.5)
HCT VFR BLD AUTO: 26 % (ref 37–48.5)
HCT VFR BLD AUTO: 26.2 % (ref 37–48.5)
HCT VFR BLD AUTO: 26.3 % (ref 37–48.5)
HCT VFR BLD AUTO: 26.8 % (ref 37–48.5)
HCT VFR BLD AUTO: 26.9 % (ref 37–48.5)
HCT VFR BLD AUTO: 26.9 % (ref 37–48.5)
HCT VFR BLD AUTO: 28 % (ref 37–48.5)
HCT VFR BLD AUTO: 28.2 % (ref 37–48.5)
HCT VFR BLD AUTO: 28.3 % (ref 37–48.5)
HCT VFR BLD AUTO: 28.7 % (ref 37–48.5)
HCT VFR BLD AUTO: 28.8 % (ref 37–48.5)
HCT VFR BLD AUTO: 28.8 % (ref 37–48.5)
HCT VFR BLD AUTO: 29 % (ref 37–48.5)
HCT VFR BLD AUTO: 29.5 % (ref 37–48.5)
HCT VFR BLD AUTO: 29.6 % (ref 37–48.5)
HCT VFR BLD AUTO: 29.8 % (ref 37–48.5)
HCT VFR BLD AUTO: 30.7 % (ref 37–48.5)
HCT VFR BLD AUTO: 30.9 % (ref 37–48.5)
HCT VFR BLD AUTO: 31.3 % (ref 37–48.5)
HCT VFR BLD AUTO: 31.5 % (ref 37–48.5)
HCT VFR BLD AUTO: 31.7 % (ref 37–48.5)
HCT VFR BLD AUTO: 31.9 % (ref 37–48.5)
HCT VFR BLD AUTO: 32.1 % (ref 37–48.5)
HCT VFR BLD AUTO: 32.2 % (ref 37–48.5)
HCT VFR BLD AUTO: 33.1 % (ref 37–48.5)
HCT VFR BLD AUTO: 33.4 % (ref 37–48.5)
HCT VFR BLD AUTO: 34.9 % (ref 37–48.5)
HCT VFR BLD AUTO: 34.9 % (ref 37–48.5)
HCT VFR BLD AUTO: 35.2 % (ref 37–48.5)
HCT VFR BLD AUTO: 35.7 % (ref 37–48.5)
HCT VFR BLD AUTO: 35.9 % (ref 37–48.5)
HCT VFR BLD AUTO: 36 % (ref 37–48.5)
HCT VFR BLD AUTO: 36.4 % (ref 37–48.5)
HCT VFR BLD AUTO: 37 %
HCT VFR BLD AUTO: 37.3 % (ref 37–48.5)
HCT VFR BLD AUTO: 38.2 %
HCT VFR BLD AUTO: 40.1 %
HCT VFR BLD AUTO: 40.5 % (ref 37–48.5)
HCT VFR BLD AUTO: 41 % (ref 37–48.5)
HCT VFR BLD AUTO: 41.2 % (ref 37–48.5)
HCT VFR BLD AUTO: 41.6 % (ref 37–48.5)
HCT VFR BLD AUTO: 41.7 % (ref 37–48.5)
HCT VFR BLD AUTO: 41.9 % (ref 37–48.5)
HCT VFR BLD AUTO: 42 % (ref 37–48.5)
HCT VFR BLD AUTO: 42.4 % (ref 37–48.5)
HCT VFR BLD AUTO: 42.4 % (ref 37–48.5)
HCT VFR BLD AUTO: 44.4 % (ref 37–48.5)
HCT VFR BLD AUTO: 44.7 % (ref 37–48.5)
HCT VFR BLD AUTO: 45.1 %
HCT VFR BLD AUTO: 46.1 %
HCT VFR BLD AUTO: 46.2 %
HCT VFR BLD AUTO: 46.7 %
HCT VFR BLD AUTO: 47.3 % (ref 37–48.5)
HCT VFR BLD AUTO: 47.5 % (ref 37–48.5)
HCT VFR BLD AUTO: 48.3 % (ref 37–48.5)
HCT VFR BLD AUTO: 49.9 % (ref 37–48.5)
HCT VFR BLD CALC: 30 %PCV (ref 36–54)
HCT VFR BLD CALC: 41 %PCV (ref 36–54)
HCT VFR BLD CALC: 48 %PCV (ref 36–54)
HCV AB SERPL QL IA: NEGATIVE
HGB BLD-MCNC: 10.1 G/DL (ref 12–16)
HGB BLD-MCNC: 10.3 G/DL (ref 12–16)
HGB BLD-MCNC: 10.4 G/DL (ref 12–16)
HGB BLD-MCNC: 10.8 G/DL (ref 12–16)
HGB BLD-MCNC: 10.9 G/DL (ref 12–16)
HGB BLD-MCNC: 11.3 G/DL (ref 12–16)
HGB BLD-MCNC: 11.6 G/DL (ref 12–16)
HGB BLD-MCNC: 11.7 G/DL
HGB BLD-MCNC: 11.9 G/DL
HGB BLD-MCNC: 12.1 G/DL (ref 12–16)
HGB BLD-MCNC: 12.2 G/DL (ref 12–16)
HGB BLD-MCNC: 12.4 G/DL
HGB BLD-MCNC: 12.5 G/DL (ref 12–16)
HGB BLD-MCNC: 12.6 G/DL (ref 12–16)
HGB BLD-MCNC: 12.7 G/DL (ref 12–16)
HGB BLD-MCNC: 12.7 G/DL (ref 12–16)
HGB BLD-MCNC: 12.8 G/DL (ref 12–16)
HGB BLD-MCNC: 12.9 G/DL (ref 12–16)
HGB BLD-MCNC: 13.2 G/DL (ref 12–16)
HGB BLD-MCNC: 13.2 G/DL (ref 12–16)
HGB BLD-MCNC: 13.5 G/DL
HGB BLD-MCNC: 13.5 G/DL (ref 12–16)
HGB BLD-MCNC: 13.7 G/DL
HGB BLD-MCNC: 13.7 G/DL
HGB BLD-MCNC: 13.8 G/DL
HGB BLD-MCNC: 13.8 G/DL (ref 12–16)
HGB BLD-MCNC: 14.2 G/DL (ref 12–16)
HGB BLD-MCNC: 14.4 G/DL (ref 12–16)
HGB BLD-MCNC: 15.3 G/DL (ref 12–16)
HGB BLD-MCNC: 6.8 G/DL (ref 12–16)
HGB BLD-MCNC: 6.9 G/DL (ref 12–16)
HGB BLD-MCNC: 7 G/DL (ref 12–16)
HGB BLD-MCNC: 7.1 G/DL (ref 12–16)
HGB BLD-MCNC: 7.2 G/DL (ref 12–16)
HGB BLD-MCNC: 7.3 G/DL (ref 12–16)
HGB BLD-MCNC: 7.4 G/DL (ref 12–16)
HGB BLD-MCNC: 7.6 G/DL (ref 12–16)
HGB BLD-MCNC: 7.7 G/DL (ref 12–16)
HGB BLD-MCNC: 7.9 G/DL (ref 12–16)
HGB BLD-MCNC: 7.9 G/DL (ref 12–16)
HGB BLD-MCNC: 8 G/DL (ref 12–16)
HGB BLD-MCNC: 8.1 G/DL (ref 12–16)
HGB BLD-MCNC: 8.2 G/DL (ref 12–16)
HGB BLD-MCNC: 8.3 G/DL (ref 12–16)
HGB BLD-MCNC: 8.4 G/DL (ref 12–16)
HGB BLD-MCNC: 8.5 G/DL (ref 12–16)
HGB BLD-MCNC: 8.5 G/DL (ref 12–16)
HGB BLD-MCNC: 8.7 G/DL (ref 12–16)
HGB BLD-MCNC: 8.9 G/DL (ref 12–16)
HGB BLD-MCNC: 8.9 G/DL (ref 12–16)
HGB BLD-MCNC: 9 G/DL (ref 12–16)
HGB BLD-MCNC: 9 G/DL (ref 12–16)
HGB BLD-MCNC: 9.1 G/DL (ref 12–16)
HGB BLD-MCNC: 9.7 G/DL (ref 12–16)
HGB BLD-MCNC: 9.8 G/DL (ref 12–16)
HGB BLD-MCNC: 9.9 G/DL (ref 12–16)
HIV 1+2 AB+HIV1 P24 AG SERPL QL IA: NEGATIVE
HUMAN BOCAVIRUS: NOT DETECTED
HUMAN CORONAVIRUS, COMMON COLD VIRUS: NOT DETECTED
HYPOCHROMIA BLD QL SMEAR: ABNORMAL
IGG SERPL-MCNC: 1123 MG/DL (ref 650–1600)
IMM GRANULOCYTES # BLD AUTO: 0.04 K/UL
IMM GRANULOCYTES # BLD AUTO: 0.04 K/UL (ref 0–0.04)
IMM GRANULOCYTES # BLD AUTO: 0.05 K/UL (ref 0–0.04)
IMM GRANULOCYTES # BLD AUTO: 0.06 K/UL (ref 0–0.04)
IMM GRANULOCYTES # BLD AUTO: 0.07 K/UL
IMM GRANULOCYTES # BLD AUTO: 0.07 K/UL (ref 0–0.04)
IMM GRANULOCYTES # BLD AUTO: 0.08 K/UL
IMM GRANULOCYTES # BLD AUTO: 0.08 K/UL (ref 0–0.04)
IMM GRANULOCYTES # BLD AUTO: 0.08 K/UL (ref 0–0.04)
IMM GRANULOCYTES # BLD AUTO: 0.09 K/UL
IMM GRANULOCYTES # BLD AUTO: 0.09 K/UL (ref 0–0.04)
IMM GRANULOCYTES # BLD AUTO: 0.1 K/UL (ref 0–0.04)
IMM GRANULOCYTES # BLD AUTO: 0.11 K/UL
IMM GRANULOCYTES # BLD AUTO: 0.11 K/UL (ref 0–0.04)
IMM GRANULOCYTES # BLD AUTO: 0.12 K/UL (ref 0–0.04)
IMM GRANULOCYTES # BLD AUTO: 0.12 K/UL (ref 0–0.04)
IMM GRANULOCYTES # BLD AUTO: 0.13 K/UL (ref 0–0.04)
IMM GRANULOCYTES # BLD AUTO: 0.14 K/UL
IMM GRANULOCYTES # BLD AUTO: 0.14 K/UL (ref 0–0.04)
IMM GRANULOCYTES # BLD AUTO: 0.14 K/UL (ref 0–0.04)
IMM GRANULOCYTES # BLD AUTO: 0.15 K/UL (ref 0–0.04)
IMM GRANULOCYTES # BLD AUTO: 0.16 K/UL (ref 0–0.04)
IMM GRANULOCYTES # BLD AUTO: 0.16 K/UL (ref 0–0.04)
IMM GRANULOCYTES # BLD AUTO: 0.17 K/UL (ref 0–0.04)
IMM GRANULOCYTES # BLD AUTO: 0.18 K/UL (ref 0–0.04)
IMM GRANULOCYTES # BLD AUTO: 0.19 K/UL (ref 0–0.04)
IMM GRANULOCYTES # BLD AUTO: 0.2 K/UL (ref 0–0.04)
IMM GRANULOCYTES # BLD AUTO: 0.21 K/UL
IMM GRANULOCYTES # BLD AUTO: 0.21 K/UL (ref 0–0.04)
IMM GRANULOCYTES # BLD AUTO: 0.24 K/UL (ref 0–0.04)
IMM GRANULOCYTES # BLD AUTO: 0.24 K/UL (ref 0–0.04)
IMM GRANULOCYTES # BLD AUTO: 0.25 K/UL (ref 0–0.04)
IMM GRANULOCYTES # BLD AUTO: 0.28 K/UL (ref 0–0.04)
IMM GRANULOCYTES # BLD AUTO: 0.28 K/UL (ref 0–0.04)
IMM GRANULOCYTES # BLD AUTO: 0.3 K/UL (ref 0–0.04)
IMM GRANULOCYTES # BLD AUTO: 0.3 K/UL (ref 0–0.04)
IMM GRANULOCYTES # BLD AUTO: 0.32 K/UL (ref 0–0.04)
IMM GRANULOCYTES # BLD AUTO: 0.34 K/UL (ref 0–0.04)
IMM GRANULOCYTES # BLD AUTO: 0.36 K/UL (ref 0–0.04)
IMM GRANULOCYTES # BLD AUTO: 0.46 K/UL (ref 0–0.04)
IMM GRANULOCYTES # BLD AUTO: 0.48 K/UL (ref 0–0.04)
IMM GRANULOCYTES # BLD AUTO: 0.51 K/UL (ref 0–0.04)
IMM GRANULOCYTES # BLD AUTO: 0.56 K/UL (ref 0–0.04)
IMM GRANULOCYTES # BLD AUTO: 0.56 K/UL (ref 0–0.04)
IMM GRANULOCYTES # BLD AUTO: 0.69 K/UL (ref 0–0.04)
IMM GRANULOCYTES # BLD AUTO: 0.75 K/UL (ref 0–0.04)
IMM GRANULOCYTES # BLD AUTO: 0.94 K/UL (ref 0–0.04)
IMM GRANULOCYTES # BLD AUTO: ABNORMAL K/UL (ref 0–0.04)
IMM GRANULOCYTES NFR BLD AUTO: 0.4 %
IMM GRANULOCYTES NFR BLD AUTO: 0.4 % (ref 0–0.5)
IMM GRANULOCYTES NFR BLD AUTO: 0.4 % (ref 0–0.5)
IMM GRANULOCYTES NFR BLD AUTO: 0.5 % (ref 0–0.5)
IMM GRANULOCYTES NFR BLD AUTO: 0.5 % (ref 0–0.5)
IMM GRANULOCYTES NFR BLD AUTO: 0.6 % (ref 0–0.5)
IMM GRANULOCYTES NFR BLD AUTO: 0.7 % (ref 0–0.5)
IMM GRANULOCYTES NFR BLD AUTO: 0.8 %
IMM GRANULOCYTES NFR BLD AUTO: 0.8 %
IMM GRANULOCYTES NFR BLD AUTO: 0.8 % (ref 0–0.5)
IMM GRANULOCYTES NFR BLD AUTO: 0.9 %
IMM GRANULOCYTES NFR BLD AUTO: 0.9 % (ref 0–0.5)
IMM GRANULOCYTES NFR BLD AUTO: 1 %
IMM GRANULOCYTES NFR BLD AUTO: 1 %
IMM GRANULOCYTES NFR BLD AUTO: 1 % (ref 0–0.5)
IMM GRANULOCYTES NFR BLD AUTO: 1.1 % (ref 0–0.5)
IMM GRANULOCYTES NFR BLD AUTO: 1.3 % (ref 0–0.5)
IMM GRANULOCYTES NFR BLD AUTO: 1.4 % (ref 0–0.5)
IMM GRANULOCYTES NFR BLD AUTO: 1.5 % (ref 0–0.5)
IMM GRANULOCYTES NFR BLD AUTO: 1.8 %
IMM GRANULOCYTES NFR BLD AUTO: 1.8 % (ref 0–0.5)
IMM GRANULOCYTES NFR BLD AUTO: 1.9 % (ref 0–0.5)
IMM GRANULOCYTES NFR BLD AUTO: 2 % (ref 0–0.5)
IMM GRANULOCYTES NFR BLD AUTO: 2.2 % (ref 0–0.5)
IMM GRANULOCYTES NFR BLD AUTO: 2.7 % (ref 0–0.5)
IMM GRANULOCYTES NFR BLD AUTO: 2.8 % (ref 0–0.5)
IMM GRANULOCYTES NFR BLD AUTO: 3.2 % (ref 0–0.5)
IMM GRANULOCYTES NFR BLD AUTO: 3.4 % (ref 0–0.5)
IMM GRANULOCYTES NFR BLD AUTO: 3.5 % (ref 0–0.5)
IMM GRANULOCYTES NFR BLD AUTO: 4.3 % (ref 0–0.5)
IMM GRANULOCYTES NFR BLD AUTO: 4.4 % (ref 0–0.5)
IMM GRANULOCYTES NFR BLD AUTO: 4.7 % (ref 0–0.5)
IMM GRANULOCYTES NFR BLD AUTO: ABNORMAL % (ref 0–0.5)
INFLUENZA A - H1N1-09: NOT DETECTED
INFLUENZA A, MOLECULAR: NEGATIVE
INFLUENZA B, MOLECULAR: NEGATIVE
INR PPP: 1.1 (ref 0.8–1.2)
INR PPP: 1.2 (ref 0.8–1.2)
INR PPP: 1.3 (ref 0.8–1.2)
INR PPP: 1.4
INR PPP: 1.4
INR PPP: 1.4 (ref 0.8–1.2)
INR PPP: 1.5
INR PPP: 1.5 (ref 0.8–1.2)
INR PPP: 1.5 (ref 2–3)
INR PPP: 1.6 (ref 0.8–1.2)
INR PPP: 1.7
INR PPP: 1.7 (ref 0.8–1.2)
INR PPP: 1.8
INR PPP: 1.8 (ref 0.8–1.2)
INR PPP: 1.9
INR PPP: 1.9
INR PPP: 1.9 (ref 0.8–1.2)
INR PPP: 1.9 (ref 2–3)
INR PPP: 2
INR PPP: 2.1
INR PPP: 2.1 (ref 0.8–1.2)
INR PPP: 2.2
INR PPP: 2.2 (ref 0.8–1.2)
INR PPP: 2.2 (ref 0.8–1.2)
INR PPP: 2.3 (ref 0.8–1.2)
INR PPP: 2.4 (ref 0.8–1.2)
INR PPP: 2.5
INR PPP: 2.5 (ref 0.8–1.2)
INR PPP: 2.5 (ref 2–3)
INR PPP: 2.5 (ref 2–3)
INR PPP: 2.6 (ref 0.8–1.2)
INR PPP: 2.7 (ref 0.8–1.2)
INR PPP: 2.7 (ref 0.8–1.2)
INR PPP: 2.8 (ref 0.8–1.2)
INR PPP: 2.9 (ref 0.8–1.2)
INR PPP: 2.9 (ref 0.8–1.2)
INR PPP: 3.1 (ref 0.8–1.2)
INR PPP: 3.2 (ref 0.8–1.2)
INR PPP: 3.2 (ref 0.8–1.2)
INR PPP: 3.3 (ref 0.8–1.2)
INR PPP: 3.4 (ref 0.8–1.2)
INR PPP: 3.6 (ref 0.8–1.2)
INR PPP: 3.7 (ref 0.8–1.2)
INR PPP: 4 (ref 0.8–1.2)
INR PPP: 4.7 (ref 0.8–1.2)
INTERVENTRICULAR SEPTUM: 0.74 CM (ref 0.6–1.1)
INTERVENTRICULAR SEPTUM: 0.89 CM (ref 0.6–1.1)
INTERVENTRICULAR SEPTUM: 1.02 CM (ref 0.6–1.1)
INTERVENTRICULAR SEPTUM: 1.14 CM (ref 0.6–1.1)
IP: 12
IP: 18
IRON SERPL-MCNC: 58 UG/DL (ref 30–160)
IVRT: 0.12 MSEC
LA MAJOR: 6.71 CM
LA MAJOR: 8.21 CM
LA MAJOR: 8.36 CM
LA MAJOR: 9 CM
LA MINOR: 6.72 CM
LA MINOR: 6.94 CM
LA MINOR: 7.43 CM
LA MINOR: 8.17 CM
LA WIDTH: 3.2 CM
LA WIDTH: 4.37 CM
LA WIDTH: 4.83 CM
LA WIDTH: 5.06 CM
LACTATE SERPL-SCNC: 1.2 MMOL/L (ref 0.5–2.2)
LACTATE SERPL-SCNC: 1.3 MMOL/L
LACTATE SERPL-SCNC: 1.8 MMOL/L (ref 0.5–2.2)
LACTATE SERPL-SCNC: 2.1 MMOL/L (ref 0.5–2.2)
LACTATE SERPL-SCNC: 2.7 MMOL/L
LACTATE SERPL-SCNC: 2.7 MMOL/L (ref 0.5–2.2)
LACTATE SERPL-SCNC: 3.7 MMOL/L (ref 0.5–2.2)
LACTATE SERPL-SCNC: 3.9 MMOL/L (ref 0.5–2.2)
LACTATE SERPL-SCNC: 4.8 MMOL/L (ref 0.5–2.2)
LACTATE SERPL-SCNC: 5.5 MMOL/L (ref 0.5–2.2)
LACTATE SERPL-SCNC: 7.5 MMOL/L (ref 0.5–2.2)
LDH SERPL L TO P-CCNC: 0.9 MMOL/L (ref 0.36–1.25)
LDH SERPL L TO P-CCNC: 1.11 MMOL/L (ref 0.36–1.25)
LDH SERPL L TO P-CCNC: 551 U/L (ref 110–260)
LEFT ATRIUM SIZE: 4.02 CM
LEFT ATRIUM SIZE: 4.68 CM
LEFT ATRIUM SIZE: 4.73 CM
LEFT ATRIUM SIZE: 5.12 CM
LEFT ATRIUM VOLUME INDEX: 33.1 ML/M2
LEFT ATRIUM VOLUME INDEX: 65.2 ML/M2
LEFT ATRIUM VOLUME INDEX: 70.3 ML/M2
LEFT ATRIUM VOLUME INDEX: 72.7 ML/M2
LEFT ATRIUM VOLUME: 149.63 CM3
LEFT ATRIUM VOLUME: 157.36 CM3
LEFT ATRIUM VOLUME: 159.43 CM3
LEFT ATRIUM VOLUME: 73.42 CM3
LEFT INTERNAL DIMENSION IN SYSTOLE: 3.6 CM (ref 2.1–4)
LEFT INTERNAL DIMENSION IN SYSTOLE: 4.66 CM (ref 2.1–4)
LEFT INTERNAL DIMENSION IN SYSTOLE: 4.72 CM (ref 2.1–4)
LEFT INTERNAL DIMENSION IN SYSTOLE: 4.76 CM (ref 2.1–4)
LEFT VENTRICLE DIASTOLIC VOLUME INDEX: 50.51 ML/M2
LEFT VENTRICLE DIASTOLIC VOLUME INDEX: 55.02 ML/M2
LEFT VENTRICLE DIASTOLIC VOLUME INDEX: 72.1 ML/M2
LEFT VENTRICLE DIASTOLIC VOLUME INDEX: 76.94 ML/M2
LEFT VENTRICLE DIASTOLIC VOLUME: 110.76 ML
LEFT VENTRICLE DIASTOLIC VOLUME: 121.94 ML
LEFT VENTRICLE DIASTOLIC VOLUME: 161.5 ML
LEFT VENTRICLE DIASTOLIC VOLUME: 176.67 ML
LEFT VENTRICLE MASS INDEX: 134 G/M2
LEFT VENTRICLE MASS INDEX: 78 G/M2
LEFT VENTRICLE MASS INDEX: 81.7 G/M2
LEFT VENTRICLE MASS INDEX: 90 G/M2
LEFT VENTRICLE SYSTOLIC VOLUME INDEX: 24.8 ML/M2
LEFT VENTRICLE SYSTOLIC VOLUME INDEX: 44.7 ML/M2
LEFT VENTRICLE SYSTOLIC VOLUME INDEX: 46 ML/M2
LEFT VENTRICLE SYSTOLIC VOLUME INDEX: 46.6 ML/M2
LEFT VENTRICLE SYSTOLIC VOLUME: 100.15 ML
LEFT VENTRICLE SYSTOLIC VOLUME: 103.21 ML
LEFT VENTRICLE SYSTOLIC VOLUME: 105.58 ML
LEFT VENTRICLE SYSTOLIC VOLUME: 54.47 ML
LEFT VENTRICULAR INTERNAL DIMENSION IN DIASTOLE: 5.07 CM (ref 3.5–6)
LEFT VENTRICULAR INTERNAL DIMENSION IN DIASTOLE: 5.7 CM (ref 3.5–6)
LEFT VENTRICULAR INTERNAL DIMENSION IN DIASTOLE: 5.72 CM (ref 3.5–6)
LEFT VENTRICULAR INTERNAL DIMENSION IN DIASTOLE: 5.95 CM (ref 3.5–6)
LEFT VENTRICULAR MASS: 175.25 G
LEFT VENTRICULAR MASS: 187.7 G
LEFT VENTRICULAR MASS: 200.13 G
LEFT VENTRICULAR MASS: 293.61 G
LEVETIRACETAM SERPL-MCNC: 5.1 UG/ML (ref 3–60)
LV LATERAL E/E' RATIO: 10.5 M/S
LYMPHOCYTES # BLD AUTO: 0.4 K/UL (ref 1–4.8)
LYMPHOCYTES # BLD AUTO: 0.4 K/UL (ref 1–4.8)
LYMPHOCYTES # BLD AUTO: 0.5 K/UL (ref 1–4.8)
LYMPHOCYTES # BLD AUTO: 0.6 K/UL (ref 1–4.8)
LYMPHOCYTES # BLD AUTO: 0.7 K/UL
LYMPHOCYTES # BLD AUTO: 0.7 K/UL (ref 1–4.8)
LYMPHOCYTES # BLD AUTO: 0.8 K/UL (ref 1–4.8)
LYMPHOCYTES # BLD AUTO: 0.9 K/UL (ref 1–4.8)
LYMPHOCYTES # BLD AUTO: 1 K/UL
LYMPHOCYTES # BLD AUTO: 1 K/UL (ref 1–4.8)
LYMPHOCYTES # BLD AUTO: 1.1 K/UL
LYMPHOCYTES # BLD AUTO: 1.1 K/UL (ref 1–4.8)
LYMPHOCYTES # BLD AUTO: 1.2 K/UL
LYMPHOCYTES # BLD AUTO: 1.2 K/UL (ref 1–4.8)
LYMPHOCYTES # BLD AUTO: 1.3 K/UL
LYMPHOCYTES # BLD AUTO: 1.3 K/UL
LYMPHOCYTES # BLD AUTO: 1.3 K/UL (ref 1–4.8)
LYMPHOCYTES # BLD AUTO: 1.4 K/UL (ref 1–4.8)
LYMPHOCYTES # BLD AUTO: 1.4 K/UL (ref 1–4.8)
LYMPHOCYTES # BLD AUTO: 1.5 K/UL
LYMPHOCYTES # BLD AUTO: 1.5 K/UL (ref 1–4.8)
LYMPHOCYTES # BLD AUTO: 1.6 K/UL (ref 1–4.8)
LYMPHOCYTES # BLD AUTO: 1.7 K/UL (ref 1–4.8)
LYMPHOCYTES # BLD AUTO: 1.8 K/UL (ref 1–4.8)
LYMPHOCYTES # BLD AUTO: ABNORMAL K/UL (ref 1–4.8)
LYMPHOCYTES NFR BLD: 1 % (ref 18–48)
LYMPHOCYTES NFR BLD: 10 % (ref 18–48)
LYMPHOCYTES NFR BLD: 10.2 % (ref 18–48)
LYMPHOCYTES NFR BLD: 10.3 %
LYMPHOCYTES NFR BLD: 11 % (ref 18–48)
LYMPHOCYTES NFR BLD: 11.2 % (ref 18–48)
LYMPHOCYTES NFR BLD: 11.2 % (ref 18–48)
LYMPHOCYTES NFR BLD: 11.3 % (ref 18–48)
LYMPHOCYTES NFR BLD: 11.9 % (ref 18–48)
LYMPHOCYTES NFR BLD: 12 % (ref 18–48)
LYMPHOCYTES NFR BLD: 12.2 % (ref 18–48)
LYMPHOCYTES NFR BLD: 12.3 % (ref 18–48)
LYMPHOCYTES NFR BLD: 12.4 %
LYMPHOCYTES NFR BLD: 12.4 % (ref 18–48)
LYMPHOCYTES NFR BLD: 12.8 %
LYMPHOCYTES NFR BLD: 12.9 %
LYMPHOCYTES NFR BLD: 13.1 %
LYMPHOCYTES NFR BLD: 13.6 % (ref 18–48)
LYMPHOCYTES NFR BLD: 14.6 % (ref 18–48)
LYMPHOCYTES NFR BLD: 17.4 % (ref 18–48)
LYMPHOCYTES NFR BLD: 2 % (ref 18–48)
LYMPHOCYTES NFR BLD: 2.4 % (ref 18–48)
LYMPHOCYTES NFR BLD: 2.5 % (ref 18–48)
LYMPHOCYTES NFR BLD: 2.6 % (ref 18–48)
LYMPHOCYTES NFR BLD: 2.8 % (ref 18–48)
LYMPHOCYTES NFR BLD: 2.8 % (ref 18–48)
LYMPHOCYTES NFR BLD: 2.9 % (ref 18–48)
LYMPHOCYTES NFR BLD: 3 % (ref 18–48)
LYMPHOCYTES NFR BLD: 3.1 % (ref 18–48)
LYMPHOCYTES NFR BLD: 3.8 % (ref 18–48)
LYMPHOCYTES NFR BLD: 3.8 % (ref 18–48)
LYMPHOCYTES NFR BLD: 3.9 % (ref 18–48)
LYMPHOCYTES NFR BLD: 4.2 % (ref 18–48)
LYMPHOCYTES NFR BLD: 4.3 % (ref 18–48)
LYMPHOCYTES NFR BLD: 4.4 % (ref 18–48)
LYMPHOCYTES NFR BLD: 4.5 % (ref 18–48)
LYMPHOCYTES NFR BLD: 5 % (ref 18–48)
LYMPHOCYTES NFR BLD: 5.1 % (ref 18–48)
LYMPHOCYTES NFR BLD: 5.1 % (ref 18–48)
LYMPHOCYTES NFR BLD: 5.2 % (ref 18–48)
LYMPHOCYTES NFR BLD: 5.3 % (ref 18–48)
LYMPHOCYTES NFR BLD: 5.4 % (ref 18–48)
LYMPHOCYTES NFR BLD: 5.9 % (ref 18–48)
LYMPHOCYTES NFR BLD: 6 % (ref 18–48)
LYMPHOCYTES NFR BLD: 6.2 % (ref 18–48)
LYMPHOCYTES NFR BLD: 6.5 % (ref 18–48)
LYMPHOCYTES NFR BLD: 6.7 % (ref 18–48)
LYMPHOCYTES NFR BLD: 6.9 % (ref 18–48)
LYMPHOCYTES NFR BLD: 7 % (ref 18–48)
LYMPHOCYTES NFR BLD: 7.3 %
LYMPHOCYTES NFR BLD: 7.4 % (ref 18–48)
LYMPHOCYTES NFR BLD: 7.5 % (ref 18–48)
LYMPHOCYTES NFR BLD: 7.6 % (ref 18–48)
LYMPHOCYTES NFR BLD: 7.8 %
LYMPHOCYTES NFR BLD: 7.8 % (ref 18–48)
LYMPHOCYTES NFR BLD: 7.9 % (ref 18–48)
LYMPHOCYTES NFR BLD: 7.9 % (ref 18–48)
LYMPHOCYTES NFR BLD: 8 % (ref 18–48)
LYMPHOCYTES NFR BLD: 8.1 % (ref 18–48)
LYMPHOCYTES NFR BLD: 8.1 % (ref 18–48)
LYMPHOCYTES NFR BLD: 8.3 % (ref 18–48)
LYMPHOCYTES NFR BLD: 8.6 % (ref 18–48)
LYMPHOCYTES NFR BLD: 8.8 % (ref 18–48)
LYMPHOCYTES NFR BLD: 8.9 % (ref 18–48)
LYMPHOCYTES NFR BLD: 9 % (ref 18–48)
MAGNESIUM SERPL-MCNC: 1.5 MG/DL (ref 1.6–2.6)
MAGNESIUM SERPL-MCNC: 1.7 MG/DL (ref 1.6–2.6)
MAGNESIUM SERPL-MCNC: 1.8 MG/DL (ref 1.6–2.6)
MAGNESIUM SERPL-MCNC: 1.8 MG/DL (ref 1.6–2.6)
MAGNESIUM SERPL-MCNC: 1.9 MG/DL (ref 1.6–2.6)
MAGNESIUM SERPL-MCNC: 2 MG/DL (ref 1.6–2.6)
MAGNESIUM SERPL-MCNC: 2.1 MG/DL (ref 1.6–2.6)
MAGNESIUM SERPL-MCNC: 2.2 MG/DL (ref 1.6–2.6)
MAGNESIUM SERPL-MCNC: 2.3 MG/DL (ref 1.6–2.6)
MAGNESIUM SERPL-MCNC: 2.4 MG/DL (ref 1.6–2.6)
MAGNESIUM SERPL-MCNC: 2.5 MG/DL (ref 1.6–2.6)
MAGNESIUM SERPL-MCNC: 2.6 MG/DL (ref 1.6–2.6)
MAGNESIUM SERPL-MCNC: 2.6 MG/DL (ref 1.6–2.6)
MAGNESIUM SERPL-MCNC: 2.8 MG/DL (ref 1.6–2.6)
MCH RBC QN AUTO: 24.7 PG (ref 27–31)
MCH RBC QN AUTO: 24.8 PG (ref 27–31)
MCH RBC QN AUTO: 24.8 PG (ref 27–31)
MCH RBC QN AUTO: 25 PG (ref 27–31)
MCH RBC QN AUTO: 25.1 PG (ref 27–31)
MCH RBC QN AUTO: 25.2 PG (ref 27–31)
MCH RBC QN AUTO: 25.2 PG (ref 27–31)
MCH RBC QN AUTO: 25.3 PG (ref 27–31)
MCH RBC QN AUTO: 25.4 PG (ref 27–31)
MCH RBC QN AUTO: 25.5 PG (ref 27–31)
MCH RBC QN AUTO: 25.6 PG (ref 27–31)
MCH RBC QN AUTO: 25.7 PG (ref 27–31)
MCH RBC QN AUTO: 25.8 PG (ref 27–31)
MCH RBC QN AUTO: 25.9 PG (ref 27–31)
MCH RBC QN AUTO: 26 PG (ref 27–31)
MCH RBC QN AUTO: 26.1 PG (ref 27–31)
MCH RBC QN AUTO: 26.2 PG (ref 27–31)
MCH RBC QN AUTO: 26.3 PG (ref 27–31)
MCH RBC QN AUTO: 26.5 PG (ref 27–31)
MCH RBC QN AUTO: 26.6 PG (ref 27–31)
MCH RBC QN AUTO: 26.6 PG (ref 27–31)
MCH RBC QN AUTO: 26.7 PG (ref 27–31)
MCH RBC QN AUTO: 26.9 PG (ref 27–31)
MCH RBC QN AUTO: 27 PG (ref 27–31)
MCH RBC QN AUTO: 27.3 PG (ref 27–31)
MCH RBC QN AUTO: 27.3 PG (ref 27–31)
MCH RBC QN AUTO: 27.5 PG
MCH RBC QN AUTO: 27.5 PG (ref 27–31)
MCH RBC QN AUTO: 27.6 PG
MCH RBC QN AUTO: 27.6 PG (ref 27–31)
MCH RBC QN AUTO: 27.7 PG
MCH RBC QN AUTO: 27.7 PG (ref 27–31)
MCH RBC QN AUTO: 27.8 PG
MCH RBC QN AUTO: 27.9 PG
MCH RBC QN AUTO: 28 PG (ref 27–31)
MCH RBC QN AUTO: 28.2 PG
MCH RBC QN AUTO: 28.2 PG
MCHC RBC AUTO-ENTMCNC: 27.4 G/DL (ref 32–36)
MCHC RBC AUTO-ENTMCNC: 27.5 G/DL (ref 32–36)
MCHC RBC AUTO-ENTMCNC: 27.7 G/DL (ref 32–36)
MCHC RBC AUTO-ENTMCNC: 27.8 G/DL (ref 32–36)
MCHC RBC AUTO-ENTMCNC: 27.9 G/DL (ref 32–36)
MCHC RBC AUTO-ENTMCNC: 27.9 G/DL (ref 32–36)
MCHC RBC AUTO-ENTMCNC: 28 G/DL (ref 32–36)
MCHC RBC AUTO-ENTMCNC: 28.1 G/DL (ref 32–36)
MCHC RBC AUTO-ENTMCNC: 28.2 G/DL (ref 32–36)
MCHC RBC AUTO-ENTMCNC: 28.2 G/DL (ref 32–36)
MCHC RBC AUTO-ENTMCNC: 28.4 G/DL (ref 32–36)
MCHC RBC AUTO-ENTMCNC: 28.5 G/DL (ref 32–36)
MCHC RBC AUTO-ENTMCNC: 28.5 G/DL (ref 32–36)
MCHC RBC AUTO-ENTMCNC: 28.6 G/DL (ref 32–36)
MCHC RBC AUTO-ENTMCNC: 28.7 G/DL (ref 32–36)
MCHC RBC AUTO-ENTMCNC: 28.8 G/DL (ref 32–36)
MCHC RBC AUTO-ENTMCNC: 28.8 G/DL (ref 32–36)
MCHC RBC AUTO-ENTMCNC: 28.9 G/DL
MCHC RBC AUTO-ENTMCNC: 28.9 G/DL (ref 32–36)
MCHC RBC AUTO-ENTMCNC: 29 G/DL (ref 32–36)
MCHC RBC AUTO-ENTMCNC: 29.1 G/DL (ref 32–36)
MCHC RBC AUTO-ENTMCNC: 29.3 G/DL (ref 32–36)
MCHC RBC AUTO-ENTMCNC: 29.4 G/DL (ref 32–36)
MCHC RBC AUTO-ENTMCNC: 29.5 G/DL (ref 32–36)
MCHC RBC AUTO-ENTMCNC: 29.6 G/DL (ref 32–36)
MCHC RBC AUTO-ENTMCNC: 29.7 G/DL
MCHC RBC AUTO-ENTMCNC: 29.7 G/DL (ref 32–36)
MCHC RBC AUTO-ENTMCNC: 29.7 G/DL (ref 32–36)
MCHC RBC AUTO-ENTMCNC: 29.8 G/DL (ref 32–36)
MCHC RBC AUTO-ENTMCNC: 29.9 G/DL
MCHC RBC AUTO-ENTMCNC: 30 G/DL (ref 32–36)
MCHC RBC AUTO-ENTMCNC: 30.2 G/DL (ref 32–36)
MCHC RBC AUTO-ENTMCNC: 30.3 G/DL (ref 32–36)
MCHC RBC AUTO-ENTMCNC: 30.4 G/DL
MCHC RBC AUTO-ENTMCNC: 30.4 G/DL (ref 32–36)
MCHC RBC AUTO-ENTMCNC: 30.5 G/DL (ref 32–36)
MCHC RBC AUTO-ENTMCNC: 30.7 G/DL (ref 32–36)
MCHC RBC AUTO-ENTMCNC: 30.7 G/DL (ref 32–36)
MCHC RBC AUTO-ENTMCNC: 30.8 G/DL (ref 32–36)
MCHC RBC AUTO-ENTMCNC: 30.9 G/DL
MCHC RBC AUTO-ENTMCNC: 30.9 G/DL (ref 32–36)
MCHC RBC AUTO-ENTMCNC: 30.9 G/DL (ref 32–36)
MCHC RBC AUTO-ENTMCNC: 31 G/DL (ref 32–36)
MCHC RBC AUTO-ENTMCNC: 31.1 G/DL (ref 32–36)
MCHC RBC AUTO-ENTMCNC: 31.1 G/DL (ref 32–36)
MCHC RBC AUTO-ENTMCNC: 31.2 G/DL
MCHC RBC AUTO-ENTMCNC: 31.6 G/DL
MCHC RBC AUTO-ENTMCNC: 31.6 G/DL (ref 32–36)
MCHC RBC AUTO-ENTMCNC: 32.2 G/DL (ref 32–36)
MCV RBC AUTO: 81 FL (ref 82–98)
MCV RBC AUTO: 82 FL (ref 82–98)
MCV RBC AUTO: 83 FL (ref 82–98)
MCV RBC AUTO: 84 FL (ref 82–98)
MCV RBC AUTO: 85 FL (ref 82–98)
MCV RBC AUTO: 86 FL (ref 82–98)
MCV RBC AUTO: 87 FL (ref 82–98)
MCV RBC AUTO: 88 FL (ref 82–98)
MCV RBC AUTO: 89 FL
MCV RBC AUTO: 89 FL
MCV RBC AUTO: 89 FL (ref 82–98)
MCV RBC AUTO: 90 FL
MCV RBC AUTO: 90 FL (ref 82–98)
MCV RBC AUTO: 91 FL
MCV RBC AUTO: 91 FL (ref 82–98)
MCV RBC AUTO: 92 FL (ref 82–98)
MCV RBC AUTO: 92 FL (ref 82–98)
MCV RBC AUTO: 93 FL (ref 82–98)
MCV RBC AUTO: 94 FL
MCV RBC AUTO: 94 FL
MCV RBC AUTO: 94 FL (ref 82–98)
MCV RBC AUTO: 95 FL (ref 82–98)
MCV RBC AUTO: 95 FL (ref 82–98)
MCV RBC AUTO: 96 FL
METAMYELOCYTES NFR BLD MANUAL: 1 %
METAMYELOCYTES NFR BLD MANUAL: 1 %
METAMYELOCYTES NFR BLD MANUAL: 2 %
METAMYELOCYTES NFR BLD MANUAL: 2.5 %
METAMYELOCYTES NFR BLD MANUAL: 2.5 %
METAMYELOCYTES NFR BLD MANUAL: 3 %
METAMYELOCYTES NFR BLD MANUAL: 4 %
METAMYELOCYTES NFR BLD MANUAL: 5 %
MIN VOL: 10.4
MIN VOL: 10.6
MIN VOL: 12
MIN VOL: 16.7
MIN VOL: 17
MIN VOL: 6
MIN VOL: 6.19
MIN VOL: 8.12
MITOCHONDRIA AB TITR SER IF: NORMAL {TITER}
MODE: ABNORMAL
MONOCYTES # BLD AUTO: 0.6 K/UL
MONOCYTES # BLD AUTO: 0.6 K/UL (ref 0.3–1)
MONOCYTES # BLD AUTO: 0.7 K/UL
MONOCYTES # BLD AUTO: 0.7 K/UL (ref 0.3–1)
MONOCYTES # BLD AUTO: 0.8 K/UL (ref 0.3–1)
MONOCYTES # BLD AUTO: 0.8 K/UL (ref 0.3–1)
MONOCYTES # BLD AUTO: 0.9 K/UL (ref 0.3–1)
MONOCYTES # BLD AUTO: 1 K/UL (ref 0.3–1)
MONOCYTES # BLD AUTO: 1.1 K/UL
MONOCYTES # BLD AUTO: 1.1 K/UL (ref 0.3–1)
MONOCYTES # BLD AUTO: 1.2 K/UL
MONOCYTES # BLD AUTO: 1.2 K/UL (ref 0.3–1)
MONOCYTES # BLD AUTO: 1.2 K/UL (ref 0.3–1)
MONOCYTES # BLD AUTO: 1.3 K/UL
MONOCYTES # BLD AUTO: 1.3 K/UL
MONOCYTES # BLD AUTO: 1.3 K/UL (ref 0.3–1)
MONOCYTES # BLD AUTO: 1.4 K/UL (ref 0.3–1)
MONOCYTES # BLD AUTO: 1.5 K/UL (ref 0.3–1)
MONOCYTES # BLD AUTO: 1.6 K/UL (ref 0.3–1)
MONOCYTES # BLD AUTO: 1.7 K/UL (ref 0.3–1)
MONOCYTES # BLD AUTO: 1.8 K/UL
MONOCYTES # BLD AUTO: 1.8 K/UL (ref 0.3–1)
MONOCYTES # BLD AUTO: 1.9 K/UL (ref 0.3–1)
MONOCYTES # BLD AUTO: 1.9 K/UL (ref 0.3–1)
MONOCYTES # BLD AUTO: 2 K/UL (ref 0.3–1)
MONOCYTES # BLD AUTO: 2.1 K/UL (ref 0.3–1)
MONOCYTES # BLD AUTO: 2.2 K/UL (ref 0.3–1)
MONOCYTES # BLD AUTO: 2.3 K/UL (ref 0.3–1)
MONOCYTES # BLD AUTO: ABNORMAL K/UL (ref 0.3–1)
MONOCYTES NFR BLD: 10 % (ref 4–15)
MONOCYTES NFR BLD: 10.1 % (ref 4–15)
MONOCYTES NFR BLD: 10.1 % (ref 4–15)
MONOCYTES NFR BLD: 10.3 %
MONOCYTES NFR BLD: 10.4 % (ref 4–15)
MONOCYTES NFR BLD: 10.6 %
MONOCYTES NFR BLD: 10.7 % (ref 4–15)
MONOCYTES NFR BLD: 11 % (ref 4–15)
MONOCYTES NFR BLD: 11.2 % (ref 4–15)
MONOCYTES NFR BLD: 11.3 %
MONOCYTES NFR BLD: 11.8 %
MONOCYTES NFR BLD: 11.8 % (ref 4–15)
MONOCYTES NFR BLD: 11.9 % (ref 4–15)
MONOCYTES NFR BLD: 11.9 % (ref 4–15)
MONOCYTES NFR BLD: 12 % (ref 4–15)
MONOCYTES NFR BLD: 12 % (ref 4–15)
MONOCYTES NFR BLD: 12.2 % (ref 4–15)
MONOCYTES NFR BLD: 12.5 % (ref 4–15)
MONOCYTES NFR BLD: 12.6 % (ref 4–15)
MONOCYTES NFR BLD: 12.7 % (ref 4–15)
MONOCYTES NFR BLD: 12.8 % (ref 4–15)
MONOCYTES NFR BLD: 12.9 % (ref 4–15)
MONOCYTES NFR BLD: 13.1 %
MONOCYTES NFR BLD: 13.4 % (ref 4–15)
MONOCYTES NFR BLD: 13.9 % (ref 4–15)
MONOCYTES NFR BLD: 15.2 % (ref 4–15)
MONOCYTES NFR BLD: 15.3 % (ref 4–15)
MONOCYTES NFR BLD: 15.7 % (ref 4–15)
MONOCYTES NFR BLD: 15.7 % (ref 4–15)
MONOCYTES NFR BLD: 16 % (ref 4–15)
MONOCYTES NFR BLD: 16.3 % (ref 4–15)
MONOCYTES NFR BLD: 17.9 % (ref 4–15)
MONOCYTES NFR BLD: 2 % (ref 4–15)
MONOCYTES NFR BLD: 2 % (ref 4–15)
MONOCYTES NFR BLD: 2.3 % (ref 4–15)
MONOCYTES NFR BLD: 3.9 % (ref 4–15)
MONOCYTES NFR BLD: 3.9 % (ref 4–15)
MONOCYTES NFR BLD: 4 % (ref 4–15)
MONOCYTES NFR BLD: 4.7 % (ref 4–15)
MONOCYTES NFR BLD: 5.6 % (ref 4–15)
MONOCYTES NFR BLD: 5.6 % (ref 4–15)
MONOCYTES NFR BLD: 6 % (ref 4–15)
MONOCYTES NFR BLD: 6.1 % (ref 4–15)
MONOCYTES NFR BLD: 6.5 % (ref 4–15)
MONOCYTES NFR BLD: 6.6 % (ref 4–15)
MONOCYTES NFR BLD: 6.9 %
MONOCYTES NFR BLD: 7.3 % (ref 4–15)
MONOCYTES NFR BLD: 7.4 % (ref 4–15)
MONOCYTES NFR BLD: 7.5 % (ref 4–15)
MONOCYTES NFR BLD: 7.7 %
MONOCYTES NFR BLD: 7.9 % (ref 4–15)
MONOCYTES NFR BLD: 8 % (ref 4–15)
MONOCYTES NFR BLD: 8.3 % (ref 4–15)
MONOCYTES NFR BLD: 8.4 % (ref 4–15)
MONOCYTES NFR BLD: 8.7 % (ref 4–15)
MONOCYTES NFR BLD: 8.8 % (ref 4–15)
MONOCYTES NFR BLD: 8.8 % (ref 4–15)
MONOCYTES NFR BLD: 8.9 % (ref 4–15)
MONOCYTES NFR BLD: 9 % (ref 4–15)
MONOCYTES NFR BLD: 9.1 % (ref 4–15)
MONOCYTES NFR BLD: 9.2 % (ref 4–15)
MONOCYTES NFR BLD: 9.4 % (ref 4–15)
MONOCYTES NFR BLD: 9.6 % (ref 4–15)
MONOCYTES NFR BLD: 9.6 % (ref 4–15)
MONOCYTES NFR BLD: 9.7 % (ref 4–15)
MONOS+MACROS NFR FLD MANUAL: 10 %
MV PEAK A VEL: 0.33 M/S
MV PEAK A VEL: 0.47 M/S
MV PEAK E VEL: 1.01 M/S
MV PEAK E VEL: 1.05 M/S
MYELOCYTES NFR BLD MANUAL: 0.5 %
MYELOCYTES NFR BLD MANUAL: 1 %
MYELOCYTES NFR BLD MANUAL: 2 %
MYELOCYTES NFR BLD MANUAL: 2 %
MYELOCYTES NFR BLD MANUAL: 3 %
MYELOCYTES NFR BLD MANUAL: 5 %
MYELOCYTES NFR BLD MANUAL: 5 %
MYELOCYTES NFR BLD MANUAL: 7.5 %
NEUTROPHILS # BLD AUTO: 10.2 K/UL (ref 1.8–7.7)
NEUTROPHILS # BLD AUTO: 10.3 K/UL (ref 1.8–7.7)
NEUTROPHILS # BLD AUTO: 10.6 K/UL (ref 1.8–7.7)
NEUTROPHILS # BLD AUTO: 10.8 K/UL (ref 1.8–7.7)
NEUTROPHILS # BLD AUTO: 10.8 K/UL (ref 1.8–7.7)
NEUTROPHILS # BLD AUTO: 11.1 K/UL (ref 1.8–7.7)
NEUTROPHILS # BLD AUTO: 11.2 K/UL (ref 1.8–7.7)
NEUTROPHILS # BLD AUTO: 11.3 K/UL (ref 1.8–7.7)
NEUTROPHILS # BLD AUTO: 11.3 K/UL (ref 1.8–7.7)
NEUTROPHILS # BLD AUTO: 11.4 K/UL (ref 1.8–7.7)
NEUTROPHILS # BLD AUTO: 11.5 K/UL (ref 1.8–7.7)
NEUTROPHILS # BLD AUTO: 12.2 K/UL (ref 1.8–7.7)
NEUTROPHILS # BLD AUTO: 12.4 K/UL (ref 1.8–7.7)
NEUTROPHILS # BLD AUTO: 12.4 K/UL (ref 1.8–7.7)
NEUTROPHILS # BLD AUTO: 12.5 K/UL (ref 1.8–7.7)
NEUTROPHILS # BLD AUTO: 12.5 K/UL (ref 1.8–7.7)
NEUTROPHILS # BLD AUTO: 12.9 K/UL (ref 1.8–7.7)
NEUTROPHILS # BLD AUTO: 13 K/UL (ref 1.8–7.7)
NEUTROPHILS # BLD AUTO: 13 K/UL (ref 1.8–7.7)
NEUTROPHILS # BLD AUTO: 13.2 K/UL (ref 1.8–7.7)
NEUTROPHILS # BLD AUTO: 13.2 K/UL (ref 1.8–7.7)
NEUTROPHILS # BLD AUTO: 13.4 K/UL (ref 1.8–7.7)
NEUTROPHILS # BLD AUTO: 13.7 K/UL (ref 1.8–7.7)
NEUTROPHILS # BLD AUTO: 13.7 K/UL (ref 1.8–7.7)
NEUTROPHILS # BLD AUTO: 13.9 K/UL (ref 1.8–7.7)
NEUTROPHILS # BLD AUTO: 14 K/UL
NEUTROPHILS # BLD AUTO: 14 K/UL (ref 1.8–7.7)
NEUTROPHILS # BLD AUTO: 14.1 K/UL (ref 1.8–7.7)
NEUTROPHILS # BLD AUTO: 14.4 K/UL (ref 1.8–7.7)
NEUTROPHILS # BLD AUTO: 15.6 K/UL (ref 1.8–7.7)
NEUTROPHILS # BLD AUTO: 15.8 K/UL (ref 1.8–7.7)
NEUTROPHILS # BLD AUTO: 16 K/UL (ref 1.8–7.7)
NEUTROPHILS # BLD AUTO: 16.4 K/UL (ref 1.8–7.7)
NEUTROPHILS # BLD AUTO: 16.6 K/UL (ref 1.8–7.7)
NEUTROPHILS # BLD AUTO: 16.7 K/UL (ref 1.8–7.7)
NEUTROPHILS # BLD AUTO: 18.9 K/UL (ref 1.8–7.7)
NEUTROPHILS # BLD AUTO: 19.8 K/UL (ref 1.8–7.7)
NEUTROPHILS # BLD AUTO: 21.2 K/UL (ref 1.8–7.7)
NEUTROPHILS # BLD AUTO: 21.7 K/UL (ref 1.8–7.7)
NEUTROPHILS # BLD AUTO: 23.6 K/UL (ref 1.8–7.7)
NEUTROPHILS # BLD AUTO: 24.5 K/UL (ref 1.8–7.7)
NEUTROPHILS # BLD AUTO: 3.8 K/UL (ref 1.8–7.7)
NEUTROPHILS # BLD AUTO: 5.1 K/UL (ref 1.8–7.7)
NEUTROPHILS # BLD AUTO: 6 K/UL (ref 1.8–7.7)
NEUTROPHILS # BLD AUTO: 6.3 K/UL
NEUTROPHILS # BLD AUTO: 6.7 K/UL
NEUTROPHILS # BLD AUTO: 6.8 K/UL (ref 1.8–7.7)
NEUTROPHILS # BLD AUTO: 7 K/UL (ref 1.8–7.7)
NEUTROPHILS # BLD AUTO: 7.5 K/UL
NEUTROPHILS # BLD AUTO: 7.6 K/UL (ref 1.8–7.7)
NEUTROPHILS # BLD AUTO: 7.7 K/UL
NEUTROPHILS # BLD AUTO: 7.7 K/UL (ref 1.8–7.7)
NEUTROPHILS # BLD AUTO: 8 K/UL
NEUTROPHILS # BLD AUTO: 8.2 K/UL (ref 1.8–7.7)
NEUTROPHILS # BLD AUTO: 8.3 K/UL
NEUTROPHILS # BLD AUTO: 8.3 K/UL (ref 1.8–7.7)
NEUTROPHILS # BLD AUTO: 8.6 K/UL (ref 1.8–7.7)
NEUTROPHILS # BLD AUTO: 9.2 K/UL (ref 1.8–7.7)
NEUTROPHILS # BLD AUTO: 9.4 K/UL (ref 1.8–7.7)
NEUTROPHILS # BLD AUTO: 9.6 K/UL (ref 1.8–7.7)
NEUTROPHILS # BLD AUTO: 9.9 K/UL (ref 1.8–7.7)
NEUTROPHILS NFR BLD: 62.7 % (ref 38–73)
NEUTROPHILS NFR BLD: 64 % (ref 38–73)
NEUTROPHILS NFR BLD: 65.7 % (ref 38–73)
NEUTROPHILS NFR BLD: 66 % (ref 38–73)
NEUTROPHILS NFR BLD: 70.2 % (ref 38–73)
NEUTROPHILS NFR BLD: 70.3 % (ref 38–73)
NEUTROPHILS NFR BLD: 70.7 % (ref 38–73)
NEUTROPHILS NFR BLD: 71.5 % (ref 38–73)
NEUTROPHILS NFR BLD: 71.8 % (ref 38–73)
NEUTROPHILS NFR BLD: 72.6 %
NEUTROPHILS NFR BLD: 72.7 %
NEUTROPHILS NFR BLD: 72.7 % (ref 38–73)
NEUTROPHILS NFR BLD: 73.8 % (ref 38–73)
NEUTROPHILS NFR BLD: 74 % (ref 38–73)
NEUTROPHILS NFR BLD: 74 % (ref 38–73)
NEUTROPHILS NFR BLD: 74.1 % (ref 38–73)
NEUTROPHILS NFR BLD: 74.8 % (ref 38–73)
NEUTROPHILS NFR BLD: 75.2 % (ref 38–73)
NEUTROPHILS NFR BLD: 75.3 %
NEUTROPHILS NFR BLD: 75.6 % (ref 38–73)
NEUTROPHILS NFR BLD: 75.7 %
NEUTROPHILS NFR BLD: 76 % (ref 38–73)
NEUTROPHILS NFR BLD: 76.3 % (ref 38–73)
NEUTROPHILS NFR BLD: 76.4 % (ref 38–73)
NEUTROPHILS NFR BLD: 76.5 % (ref 38–73)
NEUTROPHILS NFR BLD: 76.7 % (ref 38–73)
NEUTROPHILS NFR BLD: 76.7 % (ref 38–73)
NEUTROPHILS NFR BLD: 76.8 % (ref 38–73)
NEUTROPHILS NFR BLD: 76.9 % (ref 38–73)
NEUTROPHILS NFR BLD: 77 % (ref 38–73)
NEUTROPHILS NFR BLD: 77 % (ref 38–73)
NEUTROPHILS NFR BLD: 77.4 % (ref 38–73)
NEUTROPHILS NFR BLD: 77.8 % (ref 38–73)
NEUTROPHILS NFR BLD: 77.8 % (ref 38–73)
NEUTROPHILS NFR BLD: 78.3 % (ref 38–73)
NEUTROPHILS NFR BLD: 78.7 % (ref 38–73)
NEUTROPHILS NFR BLD: 78.8 %
NEUTROPHILS NFR BLD: 78.8 % (ref 38–73)
NEUTROPHILS NFR BLD: 79.9 % (ref 38–73)
NEUTROPHILS NFR BLD: 80.5 %
NEUTROPHILS NFR BLD: 81.7 % (ref 38–73)
NEUTROPHILS NFR BLD: 81.7 % (ref 38–73)
NEUTROPHILS NFR BLD: 81.8 % (ref 38–73)
NEUTROPHILS NFR BLD: 82 % (ref 38–73)
NEUTROPHILS NFR BLD: 82.1 % (ref 38–73)
NEUTROPHILS NFR BLD: 82.3 % (ref 38–73)
NEUTROPHILS NFR BLD: 82.5 %
NEUTROPHILS NFR BLD: 82.8 % (ref 38–73)
NEUTROPHILS NFR BLD: 83 % (ref 38–73)
NEUTROPHILS NFR BLD: 83.2 % (ref 38–73)
NEUTROPHILS NFR BLD: 83.3 % (ref 38–73)
NEUTROPHILS NFR BLD: 83.8 % (ref 38–73)
NEUTROPHILS NFR BLD: 84 % (ref 38–73)
NEUTROPHILS NFR BLD: 84.9 % (ref 38–73)
NEUTROPHILS NFR BLD: 85.6 % (ref 38–73)
NEUTROPHILS NFR BLD: 85.8 % (ref 38–73)
NEUTROPHILS NFR BLD: 85.9 % (ref 38–73)
NEUTROPHILS NFR BLD: 85.9 % (ref 38–73)
NEUTROPHILS NFR BLD: 86 % (ref 38–73)
NEUTROPHILS NFR BLD: 87 % (ref 38–73)
NEUTROPHILS NFR BLD: 87.2 % (ref 38–73)
NEUTROPHILS NFR BLD: 87.3 % (ref 38–73)
NEUTROPHILS NFR BLD: 87.7 % (ref 38–73)
NEUTROPHILS NFR BLD: 87.7 % (ref 38–73)
NEUTROPHILS NFR BLD: 88.3 % (ref 38–73)
NEUTROPHILS NFR BLD: 88.3 % (ref 38–73)
NEUTROPHILS NFR BLD: 89.4 % (ref 38–73)
NEUTROPHILS NFR BLD: 89.5 % (ref 38–73)
NEUTROPHILS NFR BLD: 90 % (ref 38–73)
NEUTROPHILS NFR BLD: 90.2 % (ref 38–73)
NEUTROPHILS NFR BLD: 91 % (ref 38–73)
NEUTROPHILS NFR BLD: 91.1 % (ref 38–73)
NEUTROPHILS NFR BLD: 92 % (ref 38–73)
NEUTROPHILS NFR BLD: 92.4 % (ref 38–73)
NEUTROPHILS NFR BLD: 92.8 % (ref 38–73)
NEUTROPHILS NFR FLD MANUAL: 90 %
NEUTS BAND NFR BLD MANUAL: 1 %
NEUTS BAND NFR BLD MANUAL: 1 %
NEUTS BAND NFR BLD MANUAL: 13 %
NEUTS BAND NFR BLD MANUAL: 18.5 %
NEUTS BAND NFR BLD MANUAL: 3 %
NEUTS BAND NFR BLD MANUAL: 4.5 %
NEUTS BAND NFR BLD MANUAL: 5 %
NEUTS BAND NFR BLD MANUAL: 5 %
NRBC BLD-RTO: 0 /100 WBC
NRBC BLD-RTO: 1 /100 WBC
NRBC BLD-RTO: 11 /100 WBC
NRBC BLD-RTO: 2 /100 WBC
NRBC BLD-RTO: 3 /100 WBC
NRBC BLD-RTO: 4 /100 WBC
NRBC BLD-RTO: 4 /100 WBC
NRBC BLD-RTO: 5 /100 WBC
NRBC BLD-RTO: 5 /100 WBC
NRBC BLD-RTO: 6 /100 WBC
NRBC BLD-RTO: 6 /100 WBC
NRBC BLD-RTO: 7 /100 WBC
OVALOCYTES BLD QL SMEAR: ABNORMAL
P JIROVECII DNA L RESP QL NAA+NON-PROBE: NEGATIVE
PARAINFLUENZA: NOT DETECTED
PCO2 BLDA: 24.6 MMHG (ref 35–45)
PCO2 BLDA: 31.4 MMHG (ref 35–45)
PCO2 BLDA: 32.6 MMHG (ref 35–45)
PCO2 BLDA: 32.7 MMHG (ref 35–45)
PCO2 BLDA: 34 MMHG (ref 35–45)
PCO2 BLDA: 34.1 MMHG (ref 35–45)
PCO2 BLDA: 34.5 MMHG (ref 35–45)
PCO2 BLDA: 34.7 MMHG (ref 35–45)
PCO2 BLDA: 35 MMHG (ref 35–45)
PCO2 BLDA: 37.2 MMHG (ref 35–45)
PCO2 BLDA: 37.5 MMHG (ref 35–45)
PCO2 BLDA: 37.9 MMHG (ref 35–45)
PCO2 BLDA: 38.6 MMHG (ref 35–45)
PCO2 BLDA: 40 MMHG (ref 35–45)
PCO2 BLDA: 41 MMHG (ref 35–45)
PCO2 BLDA: 42 MMHG (ref 35–45)
PCO2 BLDA: 42.8 MMHG (ref 35–45)
PCO2 BLDA: 43.6 MMHG (ref 35–45)
PCO2 BLDA: 43.8 MMHG (ref 35–45)
PCO2 BLDA: 44.1 MMHG (ref 35–45)
PCO2 BLDA: 44.4 MMHG (ref 35–45)
PCO2 BLDA: 46 MMHG (ref 35–45)
PCO2 BLDA: 46.8 MMHG (ref 35–45)
PCO2 BLDA: 46.9 MMHG (ref 35–45)
PCO2 BLDA: 48.4 MMHG (ref 35–45)
PCO2 BLDA: 51.1 MMHG (ref 35–45)
PCO2 BLDA: 51.5 MMHG (ref 35–45)
PCO2 BLDA: 51.8 MMHG (ref 35–45)
PCO2 BLDA: 54.9 MMHG (ref 35–45)
PCO2 BLDA: 58.7 MMHG (ref 35–45)
PCO2 BLDA: 66.2 MMHG (ref 35–45)
PEEP: 400
PEEP: 5
PEEP: 8
PEEP: 8
PH SMN: 7.25 [PH] (ref 7.35–7.45)
PH SMN: 7.26 [PH] (ref 7.35–7.45)
PH SMN: 7.29 [PH] (ref 7.35–7.45)
PH SMN: 7.29 [PH] (ref 7.35–7.45)
PH SMN: 7.3 [PH] (ref 7.35–7.45)
PH SMN: 7.3 [PH] (ref 7.35–7.45)
PH SMN: 7.31 [PH] (ref 7.35–7.45)
PH SMN: 7.34 [PH] (ref 7.35–7.45)
PH SMN: 7.35 [PH] (ref 7.35–7.45)
PH SMN: 7.36 [PH] (ref 7.35–7.45)
PH SMN: 7.36 [PH] (ref 7.35–7.45)
PH SMN: 7.37 [PH] (ref 7.35–7.45)
PH SMN: 7.38 [PH] (ref 7.35–7.45)
PH SMN: 7.39 [PH] (ref 7.35–7.45)
PH SMN: 7.39 [PH] (ref 7.35–7.45)
PH SMN: 7.41 [PH] (ref 7.35–7.45)
PH SMN: 7.42 [PH] (ref 7.35–7.45)
PH SMN: 7.43 [PH] (ref 7.35–7.45)
PH SMN: 7.44 [PH] (ref 7.35–7.45)
PH SMN: 7.44 [PH] (ref 7.35–7.45)
PH SMN: 7.45 [PH] (ref 7.35–7.45)
PH SMN: 7.45 [PH] (ref 7.35–7.45)
PH SMN: 7.46 [PH] (ref 7.35–7.45)
PH SMN: 7.48 [PH] (ref 7.35–7.45)
PH SMN: 7.49 [PH] (ref 7.35–7.45)
PH SMN: 7.53 [PH] (ref 7.35–7.45)
PHOSPHATE SERPL-MCNC: 1.1 MG/DL (ref 2.7–4.5)
PHOSPHATE SERPL-MCNC: 1.1 MG/DL (ref 2.7–4.5)
PHOSPHATE SERPL-MCNC: 1.2 MG/DL (ref 2.7–4.5)
PHOSPHATE SERPL-MCNC: 1.4 MG/DL
PHOSPHATE SERPL-MCNC: 1.5 MG/DL (ref 2.7–4.5)
PHOSPHATE SERPL-MCNC: 1.5 MG/DL (ref 2.7–4.5)
PHOSPHATE SERPL-MCNC: 1.6 MG/DL (ref 2.7–4.5)
PHOSPHATE SERPL-MCNC: 1.6 MG/DL (ref 2.7–4.5)
PHOSPHATE SERPL-MCNC: 1.8 MG/DL (ref 2.7–4.5)
PHOSPHATE SERPL-MCNC: 1.8 MG/DL (ref 2.7–4.5)
PHOSPHATE SERPL-MCNC: 1.9 MG/DL (ref 2.7–4.5)
PHOSPHATE SERPL-MCNC: 2 MG/DL (ref 2.7–4.5)
PHOSPHATE SERPL-MCNC: 2.2 MG/DL (ref 2.7–4.5)
PHOSPHATE SERPL-MCNC: 2.2 MG/DL (ref 2.7–4.5)
PHOSPHATE SERPL-MCNC: 2.3 MG/DL (ref 2.7–4.5)
PHOSPHATE SERPL-MCNC: 2.4 MG/DL (ref 2.7–4.5)
PHOSPHATE SERPL-MCNC: 2.4 MG/DL (ref 2.7–4.5)
PHOSPHATE SERPL-MCNC: 2.5 MG/DL (ref 2.7–4.5)
PHOSPHATE SERPL-MCNC: 2.6 MG/DL (ref 2.7–4.5)
PHOSPHATE SERPL-MCNC: 2.7 MG/DL (ref 2.7–4.5)
PHOSPHATE SERPL-MCNC: 2.8 MG/DL (ref 2.7–4.5)
PHOSPHATE SERPL-MCNC: 2.9 MG/DL (ref 2.7–4.5)
PHOSPHATE SERPL-MCNC: 3 MG/DL (ref 2.7–4.5)
PHOSPHATE SERPL-MCNC: 3.1 MG/DL (ref 2.7–4.5)
PHOSPHATE SERPL-MCNC: 3.2 MG/DL (ref 2.7–4.5)
PHOSPHATE SERPL-MCNC: 3.3 MG/DL (ref 2.7–4.5)
PHOSPHATE SERPL-MCNC: 3.5 MG/DL (ref 2.7–4.5)
PHOSPHATE SERPL-MCNC: 3.6 MG/DL (ref 2.7–4.5)
PHOSPHATE SERPL-MCNC: 3.6 MG/DL (ref 2.7–4.5)
PHOSPHATE SERPL-MCNC: 3.7 MG/DL (ref 2.7–4.5)
PHOSPHATE SERPL-MCNC: 3.8 MG/DL (ref 2.7–4.5)
PHOSPHATE SERPL-MCNC: 3.9 MG/DL (ref 2.7–4.5)
PHOSPHATE SERPL-MCNC: 3.9 MG/DL (ref 2.7–4.5)
PHOSPHATE SERPL-MCNC: 4 MG/DL
PHOSPHATE SERPL-MCNC: 4 MG/DL (ref 2.7–4.5)
PHOSPHATE SERPL-MCNC: 4.1 MG/DL (ref 2.7–4.5)
PHOSPHATE SERPL-MCNC: 4.1 MG/DL (ref 2.7–4.5)
PHOSPHATE SERPL-MCNC: 4.3 MG/DL (ref 2.7–4.5)
PHOSPHATE SERPL-MCNC: 4.4 MG/DL (ref 2.7–4.5)
PHOSPHATE SERPL-MCNC: 4.5 MG/DL (ref 2.7–4.5)
PHOSPHATE SERPL-MCNC: 4.6 MG/DL
PHOSPHATE SERPL-MCNC: 4.6 MG/DL (ref 2.7–4.5)
PHOSPHATE SERPL-MCNC: 4.7 MG/DL (ref 2.7–4.5)
PHOSPHATE SERPL-MCNC: 4.8 MG/DL (ref 2.7–4.5)
PHOSPHATE SERPL-MCNC: 4.9 MG/DL (ref 2.7–4.5)
PHOSPHATE SERPL-MCNC: 4.9 MG/DL (ref 2.7–4.5)
PHOSPHATE SERPL-MCNC: 5 MG/DL (ref 2.7–4.5)
PHOSPHATE SERPL-MCNC: 5 MG/DL (ref 2.7–4.5)
PHOSPHATE SERPL-MCNC: 5.1 MG/DL (ref 2.7–4.5)
PHOSPHATE SERPL-MCNC: 5.2 MG/DL (ref 2.7–4.5)
PHOSPHATE SERPL-MCNC: 5.4 MG/DL (ref 2.7–4.5)
PHOSPHATE SERPL-MCNC: 5.5 MG/DL (ref 2.7–4.5)
PHOSPHATE SERPL-MCNC: 5.6 MG/DL (ref 2.7–4.5)
PHOSPHATE SERPL-MCNC: 5.7 MG/DL (ref 2.7–4.5)
PHOSPHATE SERPL-MCNC: 5.8 MG/DL (ref 2.7–4.5)
PHOSPHATE SERPL-MCNC: 5.9 MG/DL (ref 2.7–4.5)
PHOSPHATE SERPL-MCNC: 6.8 MG/DL (ref 2.7–4.5)
PHOSPHATE SERPL-MCNC: 6.8 MG/DL (ref 2.7–4.5)
PIP: 25
PIP: 27
PIP: 27
PIP: 30
PIP: 31
PISA TR MAX VEL: 3.22 M/S
PISA TR MAX VEL: 3.3 M/S
PISA TR MAX VEL: 3.52 M/S
PLATELET # BLD AUTO: 106 K/UL (ref 150–350)
PLATELET # BLD AUTO: 113 K/UL (ref 150–350)
PLATELET # BLD AUTO: 113 K/UL (ref 150–350)
PLATELET # BLD AUTO: 114 K/UL (ref 150–350)
PLATELET # BLD AUTO: 115 K/UL (ref 150–350)
PLATELET # BLD AUTO: 117 K/UL (ref 150–350)
PLATELET # BLD AUTO: 117 K/UL (ref 150–350)
PLATELET # BLD AUTO: 119 K/UL (ref 150–350)
PLATELET # BLD AUTO: 119 K/UL (ref 150–350)
PLATELET # BLD AUTO: 120 K/UL (ref 150–350)
PLATELET # BLD AUTO: 122 K/UL (ref 150–350)
PLATELET # BLD AUTO: 125 K/UL (ref 150–350)
PLATELET # BLD AUTO: 133 K/UL (ref 150–350)
PLATELET # BLD AUTO: 133 K/UL (ref 150–350)
PLATELET # BLD AUTO: 134 K/UL (ref 150–350)
PLATELET # BLD AUTO: 134 K/UL (ref 150–350)
PLATELET # BLD AUTO: 135 K/UL (ref 150–350)
PLATELET # BLD AUTO: 140 K/UL (ref 150–350)
PLATELET # BLD AUTO: 141 K/UL (ref 150–350)
PLATELET # BLD AUTO: 143 K/UL (ref 150–350)
PLATELET # BLD AUTO: 144 K/UL (ref 150–350)
PLATELET # BLD AUTO: 145 K/UL (ref 150–350)
PLATELET # BLD AUTO: 147 K/UL
PLATELET # BLD AUTO: 147 K/UL (ref 150–350)
PLATELET # BLD AUTO: 147 K/UL (ref 150–350)
PLATELET # BLD AUTO: 148 K/UL (ref 150–350)
PLATELET # BLD AUTO: 151 K/UL (ref 150–350)
PLATELET # BLD AUTO: 153 K/UL (ref 150–350)
PLATELET # BLD AUTO: 155 K/UL
PLATELET # BLD AUTO: 156 K/UL
PLATELET # BLD AUTO: 157 K/UL
PLATELET # BLD AUTO: 160 K/UL (ref 150–350)
PLATELET # BLD AUTO: 164 K/UL (ref 150–350)
PLATELET # BLD AUTO: 167 K/UL (ref 150–350)
PLATELET # BLD AUTO: 168 K/UL (ref 150–350)
PLATELET # BLD AUTO: 169 K/UL (ref 150–350)
PLATELET # BLD AUTO: 171 K/UL (ref 150–350)
PLATELET # BLD AUTO: 173 K/UL
PLATELET # BLD AUTO: 174 K/UL (ref 150–350)
PLATELET # BLD AUTO: 175 K/UL (ref 150–350)
PLATELET # BLD AUTO: 178 K/UL (ref 150–350)
PLATELET # BLD AUTO: 182 K/UL (ref 150–350)
PLATELET # BLD AUTO: 182 K/UL (ref 150–350)
PLATELET # BLD AUTO: 183 K/UL (ref 150–350)
PLATELET # BLD AUTO: 190 K/UL (ref 150–350)
PLATELET # BLD AUTO: 193 K/UL
PLATELET # BLD AUTO: 195 K/UL (ref 150–350)
PLATELET # BLD AUTO: 195 K/UL (ref 150–350)
PLATELET # BLD AUTO: 207 K/UL (ref 150–350)
PLATELET # BLD AUTO: 210 K/UL (ref 150–350)
PLATELET # BLD AUTO: 211 K/UL (ref 150–350)
PLATELET # BLD AUTO: 217 K/UL (ref 150–350)
PLATELET # BLD AUTO: 220 K/UL (ref 150–350)
PLATELET # BLD AUTO: 222 K/UL (ref 150–350)
PLATELET # BLD AUTO: 223 K/UL
PLATELET # BLD AUTO: 225 K/UL (ref 150–350)
PLATELET # BLD AUTO: 233 K/UL (ref 150–350)
PLATELET # BLD AUTO: 235 K/UL (ref 150–350)
PLATELET # BLD AUTO: 244 K/UL (ref 150–350)
PLATELET # BLD AUTO: 268 K/UL (ref 150–350)
PLATELET # BLD AUTO: 273 K/UL (ref 150–350)
PLATELET # BLD AUTO: 276 K/UL (ref 150–350)
PLATELET # BLD AUTO: 277 K/UL (ref 150–350)
PLATELET # BLD AUTO: 284 K/UL (ref 150–350)
PLATELET # BLD AUTO: 290 K/UL (ref 150–350)
PLATELET # BLD AUTO: 293 K/UL (ref 150–350)
PLATELET # BLD AUTO: 295 K/UL (ref 150–350)
PLATELET # BLD AUTO: 304 K/UL (ref 150–350)
PLATELET # BLD AUTO: 311 K/UL (ref 150–350)
PLATELET # BLD AUTO: 313 K/UL (ref 150–350)
PLATELET # BLD AUTO: 331 K/UL (ref 150–350)
PLATELET # BLD AUTO: 354 K/UL (ref 150–350)
PLATELET # BLD AUTO: ABNORMAL K/UL (ref 150–350)
PLATELET BLD QL SMEAR: ABNORMAL
PMV BLD AUTO: 10.4 FL (ref 9.2–12.9)
PMV BLD AUTO: 10.5 FL (ref 9.2–12.9)
PMV BLD AUTO: 10.6 FL (ref 9.2–12.9)
PMV BLD AUTO: 10.7 FL (ref 9.2–12.9)
PMV BLD AUTO: 10.8 FL (ref 9.2–12.9)
PMV BLD AUTO: 10.9 FL (ref 9.2–12.9)
PMV BLD AUTO: 10.9 FL (ref 9.2–12.9)
PMV BLD AUTO: 11.1 FL (ref 9.2–12.9)
PMV BLD AUTO: 11.2 FL (ref 9.2–12.9)
PMV BLD AUTO: 11.5 FL (ref 9.2–12.9)
PMV BLD AUTO: 11.7 FL (ref 9.2–12.9)
PMV BLD AUTO: 11.8 FL (ref 9.2–12.9)
PMV BLD AUTO: 11.9 FL (ref 9.2–12.9)
PMV BLD AUTO: 12 FL (ref 9.2–12.9)
PMV BLD AUTO: 12.1 FL (ref 9.2–12.9)
PMV BLD AUTO: 12.1 FL (ref 9.2–12.9)
PMV BLD AUTO: 12.2 FL (ref 9.2–12.9)
PMV BLD AUTO: 12.2 FL (ref 9.2–12.9)
PMV BLD AUTO: 12.3 FL (ref 9.2–12.9)
PMV BLD AUTO: 12.3 FL (ref 9.2–12.9)
PMV BLD AUTO: 12.4 FL (ref 9.2–12.9)
PMV BLD AUTO: 12.5 FL (ref 9.2–12.9)
PMV BLD AUTO: 12.6 FL
PMV BLD AUTO: 12.7 FL
PMV BLD AUTO: 12.8 FL
PMV BLD AUTO: 12.8 FL (ref 9.2–12.9)
PMV BLD AUTO: 12.9 FL
PMV BLD AUTO: 12.9 FL (ref 9.2–12.9)
PMV BLD AUTO: 12.9 FL (ref 9.2–12.9)
PMV BLD AUTO: 13 FL
PMV BLD AUTO: 13 FL (ref 9.2–12.9)
PMV BLD AUTO: 13.1 FL
PMV BLD AUTO: 13.1 FL (ref 9.2–12.9)
PMV BLD AUTO: 13.3 FL
PMV BLD AUTO: 13.3 FL (ref 9.2–12.9)
PMV BLD AUTO: 13.3 FL (ref 9.2–12.9)
PMV BLD AUTO: 13.4 FL (ref 9.2–12.9)
PMV BLD AUTO: 13.6 FL (ref 9.2–12.9)
PMV BLD AUTO: 13.9 FL (ref 9.2–12.9)
PMV BLD AUTO: ABNORMAL FL (ref 9.2–12.9)
PNEUMOCYSTIS REPORT STATUS: NORMAL
PO2 BLDA: 101 MMHG (ref 80–100)
PO2 BLDA: 110 MMHG (ref 80–100)
PO2 BLDA: 114 MMHG (ref 80–100)
PO2 BLDA: 114 MMHG (ref 80–100)
PO2 BLDA: 116 MMHG (ref 80–100)
PO2 BLDA: 120 MMHG (ref 80–100)
PO2 BLDA: 120 MMHG (ref 80–100)
PO2 BLDA: 126 MMHG (ref 80–100)
PO2 BLDA: 130 MMHG (ref 80–100)
PO2 BLDA: 149 MMHG (ref 80–100)
PO2 BLDA: 150 MMHG (ref 80–100)
PO2 BLDA: 16 MMHG (ref 40–60)
PO2 BLDA: 17 MMHG (ref 40–60)
PO2 BLDA: 25 MMHG (ref 40–60)
PO2 BLDA: 276 MMHG (ref 80–100)
PO2 BLDA: 28 MMHG (ref 40–60)
PO2 BLDA: 521 MMHG (ref 80–100)
PO2 BLDA: 63 MMHG (ref 40–60)
PO2 BLDA: 64 MMHG (ref 40–60)
PO2 BLDA: 69 MMHG (ref 80–100)
PO2 BLDA: 70 MMHG (ref 80–100)
PO2 BLDA: 70 MMHG (ref 80–100)
PO2 BLDA: 74 MMHG (ref 40–60)
PO2 BLDA: 76 MMHG (ref 80–100)
PO2 BLDA: 77 MMHG (ref 80–100)
PO2 BLDA: 81 MMHG (ref 80–100)
PO2 BLDA: 85 MMHG (ref 80–100)
PO2 BLDA: 90 MMHG (ref 80–100)
PO2 BLDA: 91 MMHG (ref 80–100)
PO2 BLDA: 93 MMHG (ref 80–100)
PO2 BLDA: 98 MMHG (ref 80–100)
POC BE: -1 MMOL/L
POC BE: -11 MMOL/L
POC BE: -2 MMOL/L
POC BE: -2 MMOL/L
POC BE: -3 MMOL/L
POC BE: -4 MMOL/L
POC BE: -5 MMOL/L
POC BE: -5 MMOL/L
POC BE: -6 MMOL/L
POC BE: 0 MMOL/L
POC BE: 1 MMOL/L
POC BE: 10 MMOL/L
POC BE: 11 MMOL/L
POC BE: 15 MMOL/L
POC BE: 2 MMOL/L
POC BE: 2 MMOL/L
POC BE: 4 MMOL/L
POC BE: 6 MMOL/L
POC BE: 7 MMOL/L
POC BE: 8 MMOL/L
POC BE: 8 MMOL/L
POC IONIZED CALCIUM: 0.84 MMOL/L (ref 1.06–1.42)
POC IONIZED CALCIUM: 0.88 MMOL/L (ref 1.06–1.42)
POC IONIZED CALCIUM: 1.11 MMOL/L (ref 1.06–1.42)
POC SATURATED O2: 100 % (ref 95–100)
POC SATURATED O2: 100 % (ref 95–100)
POC SATURATED O2: 22 % (ref 95–100)
POC SATURATED O2: 25 % (ref 95–100)
POC SATURATED O2: 51 % (ref 95–100)
POC SATURATED O2: 62 % (ref 95–100)
POC SATURATED O2: 91 % (ref 95–100)
POC SATURATED O2: 91 % (ref 95–100)
POC SATURATED O2: 92 % (ref 95–100)
POC SATURATED O2: 93 % (ref 95–100)
POC SATURATED O2: 94 % (ref 95–100)
POC SATURATED O2: 95 % (ref 95–100)
POC SATURATED O2: 96 % (ref 95–100)
POC SATURATED O2: 97 % (ref 95–100)
POC SATURATED O2: 98 % (ref 95–100)
POC SATURATED O2: 98 % (ref 95–100)
POC SATURATED O2: 99 % (ref 95–100)
POC TCO2 (MEASURED): 27 MMOL/L (ref 23–29)
POC TCO2: 16 MMOL/L (ref 23–27)
POC TCO2: 18 MMOL/L (ref 23–27)
POC TCO2: 20 MMOL/L (ref 23–27)
POC TCO2: 20 MMOL/L (ref 23–27)
POC TCO2: 21 MMOL/L (ref 23–27)
POC TCO2: 22 MMOL/L (ref 23–27)
POC TCO2: 22 MMOL/L (ref 23–27)
POC TCO2: 23 MMOL/L (ref 23–27)
POC TCO2: 24 MMOL/L (ref 23–27)
POC TCO2: 25 MMOL/L (ref 23–27)
POC TCO2: 26 MMOL/L (ref 23–27)
POC TCO2: 26 MMOL/L (ref 24–29)
POC TCO2: 26 MMOL/L (ref 24–29)
POC TCO2: 27 MMOL/L (ref 23–27)
POC TCO2: 28 MMOL/L (ref 23–27)
POC TCO2: 29 MMOL/L (ref 23–27)
POC TCO2: 30 MMOL/L (ref 23–27)
POC TCO2: 32 MMOL/L (ref 24–29)
POC TCO2: 32 MMOL/L (ref 24–29)
POC TCO2: 34 MMOL/L (ref 23–27)
POC TCO2: 34 MMOL/L (ref 24–29)
POC TCO2: 35 MMOL/L (ref 23–27)
POC TCO2: 37 MMOL/L (ref 24–29)
POC TCO2: 40 MMOL/L (ref 24–29)
POCT GLUCOSE: 100 MG/DL (ref 70–110)
POCT GLUCOSE: 104 MG/DL (ref 70–110)
POCT GLUCOSE: 104 MG/DL (ref 70–110)
POCT GLUCOSE: 106 MG/DL (ref 70–110)
POCT GLUCOSE: 107 MG/DL (ref 70–110)
POCT GLUCOSE: 108 MG/DL (ref 70–110)
POCT GLUCOSE: 108 MG/DL (ref 70–110)
POCT GLUCOSE: 109 MG/DL (ref 70–110)
POCT GLUCOSE: 110 MG/DL (ref 70–110)
POCT GLUCOSE: 112 MG/DL (ref 70–110)
POCT GLUCOSE: 114 MG/DL (ref 70–110)
POCT GLUCOSE: 114 MG/DL (ref 70–110)
POCT GLUCOSE: 115 MG/DL (ref 70–110)
POCT GLUCOSE: 116 MG/DL (ref 70–110)
POCT GLUCOSE: 117 MG/DL (ref 70–110)
POCT GLUCOSE: 119 MG/DL (ref 70–110)
POCT GLUCOSE: 120 MG/DL (ref 70–110)
POCT GLUCOSE: 121 MG/DL (ref 70–110)
POCT GLUCOSE: 122 MG/DL (ref 70–110)
POCT GLUCOSE: 123 MG/DL (ref 70–110)
POCT GLUCOSE: 123 MG/DL (ref 70–110)
POCT GLUCOSE: 125 MG/DL (ref 70–110)
POCT GLUCOSE: 125 MG/DL (ref 70–110)
POCT GLUCOSE: 126 MG/DL (ref 70–110)
POCT GLUCOSE: 127 MG/DL (ref 70–110)
POCT GLUCOSE: 127 MG/DL (ref 70–110)
POCT GLUCOSE: 128 MG/DL (ref 70–110)
POCT GLUCOSE: 129 MG/DL (ref 70–110)
POCT GLUCOSE: 130 MG/DL (ref 70–110)
POCT GLUCOSE: 131 MG/DL (ref 70–110)
POCT GLUCOSE: 132 MG/DL (ref 70–110)
POCT GLUCOSE: 132 MG/DL (ref 70–110)
POCT GLUCOSE: 133 MG/DL (ref 70–110)
POCT GLUCOSE: 134 MG/DL (ref 70–110)
POCT GLUCOSE: 135 MG/DL (ref 70–110)
POCT GLUCOSE: 136 MG/DL (ref 70–110)
POCT GLUCOSE: 137 MG/DL (ref 70–110)
POCT GLUCOSE: 138 MG/DL (ref 70–110)
POCT GLUCOSE: 139 MG/DL (ref 70–110)
POCT GLUCOSE: 140 MG/DL (ref 70–110)
POCT GLUCOSE: 141 MG/DL (ref 70–110)
POCT GLUCOSE: 142 MG/DL (ref 70–110)
POCT GLUCOSE: 143 MG/DL (ref 70–110)
POCT GLUCOSE: 144 MG/DL (ref 70–110)
POCT GLUCOSE: 145 MG/DL (ref 70–110)
POCT GLUCOSE: 145 MG/DL (ref 70–110)
POCT GLUCOSE: 146 MG/DL (ref 70–110)
POCT GLUCOSE: 147 MG/DL (ref 70–110)
POCT GLUCOSE: 148 MG/DL (ref 70–110)
POCT GLUCOSE: 149 MG/DL (ref 70–110)
POCT GLUCOSE: 150 MG/DL (ref 70–110)
POCT GLUCOSE: 151 MG/DL (ref 70–110)
POCT GLUCOSE: 152 MG/DL (ref 70–110)
POCT GLUCOSE: 152 MG/DL (ref 70–110)
POCT GLUCOSE: 153 MG/DL (ref 70–110)
POCT GLUCOSE: 153 MG/DL (ref 70–110)
POCT GLUCOSE: 154 MG/DL (ref 70–110)
POCT GLUCOSE: 155 MG/DL (ref 70–110)
POCT GLUCOSE: 155 MG/DL (ref 70–110)
POCT GLUCOSE: 156 MG/DL (ref 70–110)
POCT GLUCOSE: 157 MG/DL (ref 70–110)
POCT GLUCOSE: 157 MG/DL (ref 70–110)
POCT GLUCOSE: 158 MG/DL (ref 70–110)
POCT GLUCOSE: 158 MG/DL (ref 70–110)
POCT GLUCOSE: 159 MG/DL (ref 70–110)
POCT GLUCOSE: 159 MG/DL (ref 70–110)
POCT GLUCOSE: 160 MG/DL (ref 70–110)
POCT GLUCOSE: 161 MG/DL (ref 70–110)
POCT GLUCOSE: 162 MG/DL (ref 70–110)
POCT GLUCOSE: 162 MG/DL (ref 70–110)
POCT GLUCOSE: 163 MG/DL (ref 70–110)
POCT GLUCOSE: 163 MG/DL (ref 70–110)
POCT GLUCOSE: 164 MG/DL (ref 70–110)
POCT GLUCOSE: 165 MG/DL (ref 70–110)
POCT GLUCOSE: 166 MG/DL (ref 70–110)
POCT GLUCOSE: 167 MG/DL (ref 70–110)
POCT GLUCOSE: 168 MG/DL (ref 70–110)
POCT GLUCOSE: 169 MG/DL (ref 70–110)
POCT GLUCOSE: 169 MG/DL (ref 70–110)
POCT GLUCOSE: 170 MG/DL (ref 70–110)
POCT GLUCOSE: 171 MG/DL (ref 70–110)
POCT GLUCOSE: 172 MG/DL (ref 70–110)
POCT GLUCOSE: 173 MG/DL (ref 70–110)
POCT GLUCOSE: 173 MG/DL (ref 70–110)
POCT GLUCOSE: 174 MG/DL (ref 70–110)
POCT GLUCOSE: 175 MG/DL (ref 70–110)
POCT GLUCOSE: 176 MG/DL (ref 70–110)
POCT GLUCOSE: 177 MG/DL (ref 70–110)
POCT GLUCOSE: 178 MG/DL (ref 70–110)
POCT GLUCOSE: 179 MG/DL (ref 70–110)
POCT GLUCOSE: 180 MG/DL (ref 70–110)
POCT GLUCOSE: 181 MG/DL (ref 70–110)
POCT GLUCOSE: 182 MG/DL (ref 70–110)
POCT GLUCOSE: 182 MG/DL (ref 70–110)
POCT GLUCOSE: 183 MG/DL (ref 70–110)
POCT GLUCOSE: 184 MG/DL (ref 70–110)
POCT GLUCOSE: 184 MG/DL (ref 70–110)
POCT GLUCOSE: 185 MG/DL (ref 70–110)
POCT GLUCOSE: 186 MG/DL (ref 70–110)
POCT GLUCOSE: 186 MG/DL (ref 70–110)
POCT GLUCOSE: 187 MG/DL (ref 70–110)
POCT GLUCOSE: 188 MG/DL (ref 70–110)
POCT GLUCOSE: 189 MG/DL (ref 70–110)
POCT GLUCOSE: 190 MG/DL (ref 70–110)
POCT GLUCOSE: 191 MG/DL (ref 70–110)
POCT GLUCOSE: 191 MG/DL (ref 70–110)
POCT GLUCOSE: 193 MG/DL (ref 70–110)
POCT GLUCOSE: 194 MG/DL (ref 70–110)
POCT GLUCOSE: 195 MG/DL (ref 70–110)
POCT GLUCOSE: 196 MG/DL (ref 70–110)
POCT GLUCOSE: 197 MG/DL (ref 70–110)
POCT GLUCOSE: 198 MG/DL (ref 70–110)
POCT GLUCOSE: 199 MG/DL (ref 70–110)
POCT GLUCOSE: 200 MG/DL (ref 70–110)
POCT GLUCOSE: 201 MG/DL (ref 70–110)
POCT GLUCOSE: 201 MG/DL (ref 70–110)
POCT GLUCOSE: 202 MG/DL (ref 70–110)
POCT GLUCOSE: 203 MG/DL (ref 70–110)
POCT GLUCOSE: 203 MG/DL (ref 70–110)
POCT GLUCOSE: 204 MG/DL (ref 70–110)
POCT GLUCOSE: 204 MG/DL (ref 70–110)
POCT GLUCOSE: 205 MG/DL (ref 70–110)
POCT GLUCOSE: 205 MG/DL (ref 70–110)
POCT GLUCOSE: 207 MG/DL (ref 70–110)
POCT GLUCOSE: 208 MG/DL (ref 70–110)
POCT GLUCOSE: 209 MG/DL (ref 70–110)
POCT GLUCOSE: 210 MG/DL (ref 70–110)
POCT GLUCOSE: 211 MG/DL (ref 70–110)
POCT GLUCOSE: 212 MG/DL (ref 70–110)
POCT GLUCOSE: 214 MG/DL (ref 70–110)
POCT GLUCOSE: 216 MG/DL (ref 70–110)
POCT GLUCOSE: 216 MG/DL (ref 70–110)
POCT GLUCOSE: 217 MG/DL (ref 70–110)
POCT GLUCOSE: 218 MG/DL (ref 70–110)
POCT GLUCOSE: 220 MG/DL (ref 70–110)
POCT GLUCOSE: 221 MG/DL (ref 70–110)
POCT GLUCOSE: 222 MG/DL (ref 70–110)
POCT GLUCOSE: 223 MG/DL (ref 70–110)
POCT GLUCOSE: 223 MG/DL (ref 70–110)
POCT GLUCOSE: 224 MG/DL (ref 70–110)
POCT GLUCOSE: 225 MG/DL (ref 70–110)
POCT GLUCOSE: 226 MG/DL (ref 70–110)
POCT GLUCOSE: 226 MG/DL (ref 70–110)
POCT GLUCOSE: 227 MG/DL (ref 70–110)
POCT GLUCOSE: 228 MG/DL (ref 70–110)
POCT GLUCOSE: 229 MG/DL (ref 70–110)
POCT GLUCOSE: 231 MG/DL (ref 70–110)
POCT GLUCOSE: 232 MG/DL (ref 70–110)
POCT GLUCOSE: 233 MG/DL (ref 70–110)
POCT GLUCOSE: 233 MG/DL (ref 70–110)
POCT GLUCOSE: 235 MG/DL (ref 70–110)
POCT GLUCOSE: 236 MG/DL (ref 70–110)
POCT GLUCOSE: 236 MG/DL (ref 70–110)
POCT GLUCOSE: 237 MG/DL (ref 70–110)
POCT GLUCOSE: 237 MG/DL (ref 70–110)
POCT GLUCOSE: 238 MG/DL (ref 70–110)
POCT GLUCOSE: 239 MG/DL (ref 70–110)
POCT GLUCOSE: 240 MG/DL (ref 70–110)
POCT GLUCOSE: 241 MG/DL (ref 70–110)
POCT GLUCOSE: 242 MG/DL (ref 70–110)
POCT GLUCOSE: 243 MG/DL (ref 70–110)
POCT GLUCOSE: 243 MG/DL (ref 70–110)
POCT GLUCOSE: 245 MG/DL (ref 70–110)
POCT GLUCOSE: 247 MG/DL (ref 70–110)
POCT GLUCOSE: 248 MG/DL (ref 70–110)
POCT GLUCOSE: 249 MG/DL (ref 70–110)
POCT GLUCOSE: 250 MG/DL (ref 70–110)
POCT GLUCOSE: 251 MG/DL (ref 70–110)
POCT GLUCOSE: 252 MG/DL (ref 70–110)
POCT GLUCOSE: 253 MG/DL (ref 70–110)
POCT GLUCOSE: 254 MG/DL (ref 70–110)
POCT GLUCOSE: 255 MG/DL (ref 70–110)
POCT GLUCOSE: 256 MG/DL (ref 70–110)
POCT GLUCOSE: 258 MG/DL (ref 70–110)
POCT GLUCOSE: 261 MG/DL (ref 70–110)
POCT GLUCOSE: 261 MG/DL (ref 70–110)
POCT GLUCOSE: 263 MG/DL (ref 70–110)
POCT GLUCOSE: 264 MG/DL (ref 70–110)
POCT GLUCOSE: 264 MG/DL (ref 70–110)
POCT GLUCOSE: 266 MG/DL (ref 70–110)
POCT GLUCOSE: 269 MG/DL (ref 70–110)
POCT GLUCOSE: 273 MG/DL (ref 70–110)
POCT GLUCOSE: 274 MG/DL (ref 70–110)
POCT GLUCOSE: 275 MG/DL (ref 70–110)
POCT GLUCOSE: 275 MG/DL (ref 70–110)
POCT GLUCOSE: 277 MG/DL (ref 70–110)
POCT GLUCOSE: 278 MG/DL (ref 70–110)
POCT GLUCOSE: 283 MG/DL (ref 70–110)
POCT GLUCOSE: 283 MG/DL (ref 70–110)
POCT GLUCOSE: 285 MG/DL (ref 70–110)
POCT GLUCOSE: 285 MG/DL (ref 70–110)
POCT GLUCOSE: 288 MG/DL (ref 70–110)
POCT GLUCOSE: 29 MG/DL (ref 70–110)
POCT GLUCOSE: 291 MG/DL (ref 70–110)
POCT GLUCOSE: 291 MG/DL (ref 70–110)
POCT GLUCOSE: 293 MG/DL (ref 70–110)
POCT GLUCOSE: 293 MG/DL (ref 70–110)
POCT GLUCOSE: 295 MG/DL (ref 70–110)
POCT GLUCOSE: 296 MG/DL (ref 70–110)
POCT GLUCOSE: 298 MG/DL (ref 70–110)
POCT GLUCOSE: 298 MG/DL (ref 70–110)
POCT GLUCOSE: 300 MG/DL (ref 70–110)
POCT GLUCOSE: 316 MG/DL (ref 70–110)
POCT GLUCOSE: 317 MG/DL (ref 70–110)
POCT GLUCOSE: 324 MG/DL (ref 70–110)
POCT GLUCOSE: 324 MG/DL (ref 70–110)
POCT GLUCOSE: 325 MG/DL (ref 70–110)
POCT GLUCOSE: 326 MG/DL (ref 70–110)
POCT GLUCOSE: 329 MG/DL (ref 70–110)
POCT GLUCOSE: 341 MG/DL (ref 70–110)
POCT GLUCOSE: 342 MG/DL (ref 70–110)
POCT GLUCOSE: 374 MG/DL (ref 70–110)
POCT GLUCOSE: 47 MG/DL (ref 70–110)
POCT GLUCOSE: 50 MG/DL (ref 70–110)
POCT GLUCOSE: 52 MG/DL (ref 70–110)
POCT GLUCOSE: 54 MG/DL (ref 70–110)
POCT GLUCOSE: 54 MG/DL (ref 70–110)
POCT GLUCOSE: 56 MG/DL (ref 70–110)
POCT GLUCOSE: 72 MG/DL (ref 70–110)
POCT GLUCOSE: 72 MG/DL (ref 70–110)
POCT GLUCOSE: 78 MG/DL (ref 70–110)
POCT GLUCOSE: 89 MG/DL (ref 70–110)
POCT GLUCOSE: 90 MG/DL (ref 70–110)
POCT GLUCOSE: 95 MG/DL (ref 70–110)
POCT GLUCOSE: 96 MG/DL (ref 70–110)
POCT GLUCOSE: 98 MG/DL (ref 70–110)
POCT GLUCOSE: 99 MG/DL (ref 70–110)
POCT GLUCOSE: 99 MG/DL (ref 70–110)
POIKILOCYTOSIS BLD QL SMEAR: ABNORMAL
POIKILOCYTOSIS BLD QL SMEAR: ABNORMAL
POIKILOCYTOSIS BLD QL SMEAR: SLIGHT
POLYCHROMASIA BLD QL SMEAR: ABNORMAL
POTASSIUM BLD-SCNC: 3.7 MMOL/L (ref 3.5–5.1)
POTASSIUM BLD-SCNC: 4.5 MMOL/L (ref 3.5–5.1)
POTASSIUM BLD-SCNC: 5.4 MMOL/L (ref 3.5–5.1)
POTASSIUM SERPL-SCNC: 3 MMOL/L (ref 3.5–5.1)
POTASSIUM SERPL-SCNC: 3.4 MMOL/L (ref 3.5–5.1)
POTASSIUM SERPL-SCNC: 3.4 MMOL/L (ref 3.5–5.1)
POTASSIUM SERPL-SCNC: 3.5 MMOL/L (ref 3.5–5.1)
POTASSIUM SERPL-SCNC: 3.6 MMOL/L (ref 3.5–5.1)
POTASSIUM SERPL-SCNC: 3.7 MMOL/L (ref 3.5–5.1)
POTASSIUM SERPL-SCNC: 3.8 MMOL/L (ref 3.5–5.1)
POTASSIUM SERPL-SCNC: 3.9 MMOL/L
POTASSIUM SERPL-SCNC: 3.9 MMOL/L (ref 3.5–5.1)
POTASSIUM SERPL-SCNC: 4 MMOL/L (ref 3.5–5.1)
POTASSIUM SERPL-SCNC: 4.1 MMOL/L (ref 3.5–5.1)
POTASSIUM SERPL-SCNC: 4.2 MMOL/L (ref 3.5–5.1)
POTASSIUM SERPL-SCNC: 4.3 MMOL/L
POTASSIUM SERPL-SCNC: 4.3 MMOL/L
POTASSIUM SERPL-SCNC: 4.3 MMOL/L (ref 3.5–5.1)
POTASSIUM SERPL-SCNC: 4.4 MMOL/L (ref 3.5–5.1)
POTASSIUM SERPL-SCNC: 4.5 MMOL/L (ref 3.5–5.1)
POTASSIUM SERPL-SCNC: 4.6 MMOL/L
POTASSIUM SERPL-SCNC: 4.6 MMOL/L (ref 3.5–5.1)
POTASSIUM SERPL-SCNC: 4.7 MMOL/L (ref 3.5–5.1)
POTASSIUM SERPL-SCNC: 4.8 MMOL/L (ref 3.5–5.1)
POTASSIUM SERPL-SCNC: 4.9 MMOL/L
POTASSIUM SERPL-SCNC: 4.9 MMOL/L
POTASSIUM SERPL-SCNC: 4.9 MMOL/L (ref 3.5–5.1)
POTASSIUM SERPL-SCNC: 5 MMOL/L (ref 3.5–5.1)
POTASSIUM SERPL-SCNC: 5.1 MMOL/L (ref 3.5–5.1)
POTASSIUM SERPL-SCNC: 5.1 MMOL/L (ref 3.5–5.1)
POTASSIUM SERPL-SCNC: 5.2 MMOL/L (ref 3.5–5.1)
POTASSIUM SERPL-SCNC: 5.3 MMOL/L (ref 3.5–5.1)
POTASSIUM SERPL-SCNC: 5.4 MMOL/L
POTASSIUM SERPL-SCNC: 5.5 MMOL/L (ref 3.5–5.1)
POTASSIUM SERPL-SCNC: 5.6 MMOL/L (ref 3.5–5.1)
POTASSIUM SERPL-SCNC: 5.6 MMOL/L (ref 3.5–5.1)
POTASSIUM SERPL-SCNC: 5.7 MMOL/L (ref 3.5–5.1)
POTASSIUM SERPL-SCNC: 5.7 MMOL/L (ref 3.5–5.1)
POTASSIUM SERPL-SCNC: 5.8 MMOL/L (ref 3.5–5.1)
POTASSIUM SERPL-SCNC: 5.8 MMOL/L (ref 3.5–5.1)
POTASSIUM SERPL-SCNC: 5.9 MMOL/L (ref 3.5–5.1)
PRE FEV1 FVC: 86
PRE FEV1: 1.4
PRE FVC: 1.63
PREDICTED FEV1 FVC: 79
PREDICTED FEV1: 2.71
PREDICTED FVC: 3.4
PROCALCITONIN SERPL IA-MCNC: 0.62 NG/ML
PROCALCITONIN SERPL IA-MCNC: 2.29 NG/ML
PROCALCITONIN SERPL IA-MCNC: 3.54 NG/ML
PROCALCITONIN SERPL IA-MCNC: 3.73 NG/ML
PROMYELOCYTES NFR BLD MANUAL: 0.5 %
PROT SERPL-MCNC: 5.6 G/DL (ref 6–8.4)
PROT SERPL-MCNC: 5.7 G/DL (ref 6–8.4)
PROT SERPL-MCNC: 5.8 G/DL (ref 6–8.4)
PROT SERPL-MCNC: 6 G/DL (ref 6–8.4)
PROT SERPL-MCNC: 6.1 G/DL (ref 6–8.4)
PROT SERPL-MCNC: 6.1 G/DL (ref 6–8.4)
PROT SERPL-MCNC: 6.3 G/DL (ref 6–8.4)
PROT SERPL-MCNC: 6.4 G/DL (ref 6–8.4)
PROT SERPL-MCNC: 6.5 G/DL (ref 6–8.4)
PROT SERPL-MCNC: 6.5 G/DL (ref 6–8.4)
PROT SERPL-MCNC: 6.6 G/DL (ref 6–8.4)
PROT SERPL-MCNC: 6.7 G/DL (ref 6–8.4)
PROT SERPL-MCNC: 6.8 G/DL (ref 6–8.4)
PROT SERPL-MCNC: 6.9 G/DL (ref 6–8.4)
PROT SERPL-MCNC: 7 G/DL (ref 6–8.4)
PROT SERPL-MCNC: 7 G/DL (ref 6–8.4)
PROT SERPL-MCNC: 7.1 G/DL (ref 6–8.4)
PROT SERPL-MCNC: 7.1 G/DL (ref 6–8.4)
PROT SERPL-MCNC: 7.2 G/DL
PROT SERPL-MCNC: 7.2 G/DL (ref 6–8.4)
PROT SERPL-MCNC: 7.3 G/DL (ref 6–8.4)
PROT SERPL-MCNC: 7.4 G/DL (ref 6–8.4)
PROT SERPL-MCNC: 7.5 G/DL (ref 6–8.4)
PROT SERPL-MCNC: 7.5 G/DL (ref 6–8.4)
PROT SERPL-MCNC: 7.6 G/DL (ref 6–8.4)
PROT SERPL-MCNC: 7.6 G/DL (ref 6–8.4)
PROT SERPL-MCNC: 7.7 G/DL
PROT SERPL-MCNC: 7.8 G/DL (ref 6–8.4)
PROT SERPL-MCNC: 7.8 G/DL (ref 6–8.4)
PROT SERPL-MCNC: 8 G/DL (ref 6–8.4)
PROT SERPL-MCNC: 8 G/DL (ref 6–8.4)
PROT SERPL-MCNC: 8.2 G/DL (ref 6–8.4)
PROT SERPL-MCNC: 8.3 G/DL (ref 6–8.4)
PROT SERPL-MCNC: 8.5 G/DL (ref 6–8.4)
PROTHROMBIN TIME: 11.1 SEC (ref 9–12.5)
PROTHROMBIN TIME: 11.3 SEC (ref 9–12.5)
PROTHROMBIN TIME: 11.3 SEC (ref 9–12.5)
PROTHROMBIN TIME: 11.4 SEC (ref 9–12.5)
PROTHROMBIN TIME: 11.5 SEC (ref 9–12.5)
PROTHROMBIN TIME: 11.5 SEC (ref 9–12.5)
PROTHROMBIN TIME: 11.6 SEC (ref 9–12.5)
PROTHROMBIN TIME: 11.7 SEC (ref 9–12.5)
PROTHROMBIN TIME: 11.7 SEC (ref 9–12.5)
PROTHROMBIN TIME: 11.8 SEC (ref 9–12.5)
PROTHROMBIN TIME: 11.8 SEC (ref 9–12.5)
PROTHROMBIN TIME: 12 SEC (ref 9–12.5)
PROTHROMBIN TIME: 12 SEC (ref 9–12.5)
PROTHROMBIN TIME: 12.2 SEC (ref 9–12.5)
PROTHROMBIN TIME: 12.4 SEC (ref 9–12.5)
PROTHROMBIN TIME: 12.5 SEC (ref 9–12.5)
PROTHROMBIN TIME: 12.7 SEC (ref 9–12.5)
PROTHROMBIN TIME: 12.9 SEC (ref 9–12.5)
PROTHROMBIN TIME: 13.4 SEC (ref 9–12.5)
PROTHROMBIN TIME: 13.4 SEC (ref 9–12.5)
PROTHROMBIN TIME: 13.8 SEC (ref 9–12.5)
PROTHROMBIN TIME: 13.9 SEC (ref 9–12.5)
PROTHROMBIN TIME: 14 SEC (ref 9–12.5)
PROTHROMBIN TIME: 14.2 SEC (ref 9–12.5)
PROTHROMBIN TIME: 14.3 SEC
PROTHROMBIN TIME: 14.4 SEC (ref 9–12.5)
PROTHROMBIN TIME: 14.5 SEC (ref 9–12.5)
PROTHROMBIN TIME: 14.5 SEC (ref 9–12.5)
PROTHROMBIN TIME: 14.6 SEC (ref 9–12.5)
PROTHROMBIN TIME: 14.7 SEC
PROTHROMBIN TIME: 14.8 SEC (ref 9–12.5)
PROTHROMBIN TIME: 14.9 SEC (ref 9–12.5)
PROTHROMBIN TIME: 15 SEC
PROTHROMBIN TIME: 15 SEC (ref 9–12.5)
PROTHROMBIN TIME: 15.1 SEC (ref 9–12.5)
PROTHROMBIN TIME: 15.8 SEC (ref 9–12.5)
PROTHROMBIN TIME: 15.9 SEC (ref 9–12.5)
PROTHROMBIN TIME: 16.1 SEC (ref 9–12.5)
PROTHROMBIN TIME: 16.3 SEC
PROTHROMBIN TIME: 16.4 SEC (ref 9–12.5)
PROTHROMBIN TIME: 16.6 SEC (ref 9–12.5)
PROTHROMBIN TIME: 16.9 SEC (ref 9–12.5)
PROTHROMBIN TIME: 17.5 SEC (ref 9–12.5)
PROTHROMBIN TIME: 17.5 SEC (ref 9–12.5)
PROTHROMBIN TIME: 17.6 SEC (ref 9–12.5)
PROTHROMBIN TIME: 17.6 SEC (ref 9–12.5)
PROTHROMBIN TIME: 18 SEC (ref 9–12.5)
PROTHROMBIN TIME: 18 SEC (ref 9–12.5)
PROTHROMBIN TIME: 18.4 SEC (ref 9–12.5)
PROTHROMBIN TIME: 18.8 SEC (ref 9–12.5)
PROTHROMBIN TIME: 18.9 SEC (ref 9–12.5)
PROTHROMBIN TIME: 19.7 SEC
PROTHROMBIN TIME: 19.9 SEC
PROTHROMBIN TIME: 20.1 SEC (ref 9–12.5)
PROTHROMBIN TIME: 20.2 SEC (ref 9–12.5)
PROTHROMBIN TIME: 20.8 SEC (ref 9–12.5)
PROTHROMBIN TIME: 21 SEC
PROTHROMBIN TIME: 21.5 SEC (ref 9–12.5)
PROTHROMBIN TIME: 21.7 SEC (ref 9–12.5)
PROTHROMBIN TIME: 22.5 SEC (ref 9–12.5)
PROTHROMBIN TIME: 22.5 SEC (ref 9–12.5)
PROTHROMBIN TIME: 22.7 SEC (ref 9–12.5)
PROTHROMBIN TIME: 23.2 SEC (ref 9–12.5)
PROTHROMBIN TIME: 24.1 SEC
PROTHROMBIN TIME: 24.4 SEC (ref 9–12.5)
PROTHROMBIN TIME: 25.1 SEC (ref 9–12.5)
PROTHROMBIN TIME: 25.2 SEC (ref 9–12.5)
PROTHROMBIN TIME: 25.3 SEC (ref 9–12.5)
PROTHROMBIN TIME: 25.5 SEC (ref 9–12.5)
PROTHROMBIN TIME: 25.6 SEC (ref 9–12.5)
PROTHROMBIN TIME: 25.8 SEC (ref 9–12.5)
PROTHROMBIN TIME: 25.9 SEC (ref 9–12.5)
PROTHROMBIN TIME: 27.1 SEC (ref 9–12.5)
PROTHROMBIN TIME: 27.3 SEC (ref 9–12.5)
PROTHROMBIN TIME: 27.3 SEC (ref 9–12.5)
PROTHROMBIN TIME: 27.5 SEC (ref 9–12.5)
PROTHROMBIN TIME: 27.6 SEC (ref 9–12.5)
PROTHROMBIN TIME: 27.8 SEC (ref 9–12.5)
PROTHROMBIN TIME: 29.8 SEC (ref 9–12.5)
PROTHROMBIN TIME: 31.2 SEC (ref 9–12.5)
PROTHROMBIN TIME: 31.4 SEC (ref 9–12.5)
PROTHROMBIN TIME: 32.1 SEC (ref 9–12.5)
PROTHROMBIN TIME: 32.7 SEC (ref 9–12.5)
PROTHROMBIN TIME: 35.2 SEC (ref 9–12.5)
PROTHROMBIN TIME: 36.6 SEC (ref 9–12.5)
PROTHROMBIN TIME: 39.1 SEC (ref 9–12.5)
PROTHROMBIN TIME: 46.2 SEC (ref 9–12.5)
PROVIDER CREDENTIALS: ABNORMAL
PS: 10
PS: 10
RA MAJOR: 5.78 CM
RA MAJOR: 7.08 CM
RA MAJOR: 7.28 CM
RA MAJOR: 7.69 CM
RA PRESSURE: 15 MMHG
RA PRESSURE: 15 MMHG
RA WIDTH: 3.74 CM
RA WIDTH: 3.9 CM
RA WIDTH: 4.71 CM
RA WIDTH: 4.73 CM
RBC # BLD AUTO: 2.68 M/UL (ref 4–5.4)
RBC # BLD AUTO: 2.71 M/UL (ref 4–5.4)
RBC # BLD AUTO: 2.72 M/UL (ref 4–5.4)
RBC # BLD AUTO: 2.74 M/UL (ref 4–5.4)
RBC # BLD AUTO: 2.75 M/UL (ref 4–5.4)
RBC # BLD AUTO: 2.75 M/UL (ref 4–5.4)
RBC # BLD AUTO: 2.76 M/UL (ref 4–5.4)
RBC # BLD AUTO: 2.77 M/UL (ref 4–5.4)
RBC # BLD AUTO: 2.79 M/UL (ref 4–5.4)
RBC # BLD AUTO: 2.83 M/UL (ref 4–5.4)
RBC # BLD AUTO: 2.83 M/UL (ref 4–5.4)
RBC # BLD AUTO: 2.84 M/UL (ref 4–5.4)
RBC # BLD AUTO: 2.84 M/UL (ref 4–5.4)
RBC # BLD AUTO: 2.85 M/UL (ref 4–5.4)
RBC # BLD AUTO: 2.85 M/UL (ref 4–5.4)
RBC # BLD AUTO: 2.87 M/UL (ref 4–5.4)
RBC # BLD AUTO: 2.88 M/UL (ref 4–5.4)
RBC # BLD AUTO: 2.92 M/UL (ref 4–5.4)
RBC # BLD AUTO: 2.94 M/UL (ref 4–5.4)
RBC # BLD AUTO: 2.97 M/UL (ref 4–5.4)
RBC # BLD AUTO: 3.01 M/UL (ref 4–5.4)
RBC # BLD AUTO: 3.06 M/UL (ref 4–5.4)
RBC # BLD AUTO: 3.07 M/UL (ref 4–5.4)
RBC # BLD AUTO: 3.13 M/UL (ref 4–5.4)
RBC # BLD AUTO: 3.14 M/UL (ref 4–5.4)
RBC # BLD AUTO: 3.18 M/UL (ref 4–5.4)
RBC # BLD AUTO: 3.19 M/UL (ref 4–5.4)
RBC # BLD AUTO: 3.25 M/UL (ref 4–5.4)
RBC # BLD AUTO: 3.27 M/UL (ref 4–5.4)
RBC # BLD AUTO: 3.31 M/UL (ref 4–5.4)
RBC # BLD AUTO: 3.33 M/UL (ref 4–5.4)
RBC # BLD AUTO: 3.43 M/UL (ref 4–5.4)
RBC # BLD AUTO: 3.52 M/UL (ref 4–5.4)
RBC # BLD AUTO: 3.53 M/UL (ref 4–5.4)
RBC # BLD AUTO: 3.56 M/UL (ref 4–5.4)
RBC # BLD AUTO: 3.58 M/UL (ref 4–5.4)
RBC # BLD AUTO: 3.59 M/UL (ref 4–5.4)
RBC # BLD AUTO: 3.62 M/UL (ref 4–5.4)
RBC # BLD AUTO: 3.63 M/UL (ref 4–5.4)
RBC # BLD AUTO: 3.7 M/UL (ref 4–5.4)
RBC # BLD AUTO: 3.72 M/UL (ref 4–5.4)
RBC # BLD AUTO: 3.82 M/UL (ref 4–5.4)
RBC # BLD AUTO: 3.94 M/UL (ref 4–5.4)
RBC # BLD AUTO: 3.95 M/UL (ref 4–5.4)
RBC # BLD AUTO: 4.04 M/UL (ref 4–5.4)
RBC # BLD AUTO: 4.06 M/UL (ref 4–5.4)
RBC # BLD AUTO: 4.1 M/UL (ref 4–5.4)
RBC # BLD AUTO: 4.15 M/UL
RBC # BLD AUTO: 4.25 M/UL (ref 4–5.4)
RBC # BLD AUTO: 4.31 M/UL
RBC # BLD AUTO: 4.44 M/UL
RBC # BLD AUTO: 4.44 M/UL (ref 4–5.4)
RBC # BLD AUTO: 4.54 M/UL (ref 4–5.4)
RBC # BLD AUTO: 4.68 M/UL (ref 4–5.4)
RBC # BLD AUTO: 4.72 M/UL (ref 4–5.4)
RBC # BLD AUTO: 4.75 M/UL (ref 4–5.4)
RBC # BLD AUTO: 4.79 M/UL (ref 4–5.4)
RBC # BLD AUTO: 4.84 M/UL (ref 4–5.4)
RBC # BLD AUTO: 4.88 M/UL
RBC # BLD AUTO: 4.9 M/UL
RBC # BLD AUTO: 4.91 M/UL (ref 4–5.4)
RBC # BLD AUTO: 4.91 M/UL (ref 4–5.4)
RBC # BLD AUTO: 4.92 M/UL (ref 4–5.4)
RBC # BLD AUTO: 4.93 M/UL
RBC # BLD AUTO: 4.93 M/UL (ref 4–5.4)
RBC # BLD AUTO: 4.98 M/UL
RBC # BLD AUTO: 5 M/UL (ref 4–5.4)
RBC # BLD AUTO: 5.05 M/UL (ref 4–5.4)
RBC # BLD AUTO: 5.07 M/UL (ref 4–5.4)
RBC # BLD AUTO: 5.13 M/UL (ref 4–5.4)
RBC # BLD AUTO: 5.4 M/UL (ref 4–5.4)
RBC # BLD AUTO: 5.43 M/UL (ref 4–5.4)
RBC # BLD AUTO: 5.84 M/UL (ref 4–5.4)
RBC AGGLUT BLD QL: PRESENT
RIGHT VENTRICULAR END-DIASTOLIC DIMENSION: 3.8 CM
RIGHT VENTRICULAR END-DIASTOLIC DIMENSION: 3.8 CM
RIGHT VENTRICULAR END-DIASTOLIC DIMENSION: 4.49 CM
RIGHT VENTRICULAR END-DIASTOLIC DIMENSION: 5 CM
RV TISSUE DOPPLER FREE WALL SYSTOLIC VELOCITY 1 (APICAL 4 CHAMBER VIEW): 11.31 M/S
RVP - ADENOVIRUS: NOT DETECTED
RVP - HUMAN METAPNEUMOVIRUS (HMPV): NOT DETECTED
RVP - INFLUENZA A: NOT DETECTED
RVP - INFLUENZA B: NOT DETECTED
RVP - RESPIRATORY SYNCTIAL VIRUS (RSV) A: NOT DETECTED
RVP - RESPIRATORY VIRAL PANEL, SOURCE: ABNORMAL
RVP - RHINOVIRUS: NOT DETECTED
SAMPLE: ABNORMAL
SATURATED IRON: 20 % (ref 20–50)
SCHISTOCYTES BLD QL SMEAR: ABNORMAL
SCHISTOCYTES BLD QL SMEAR: PRESENT
SINUS: 3.04 CM
SINUS: 3.18 CM
SINUS: 3.43 CM
SINUS: 3.84 CM
SITE: ABNORMAL
SMOOTH MUSCLE AB TITR SER IF: ABNORMAL {TITER}
SMUDGE CELLS BLD QL SMEAR: PRESENT
SODIUM BLD-SCNC: 132 MMOL/L (ref 136–145)
SODIUM BLD-SCNC: 132 MMOL/L (ref 136–145)
SODIUM BLD-SCNC: 138 MMOL/L (ref 136–145)
SODIUM SERPL-SCNC: 124 MMOL/L (ref 136–145)
SODIUM SERPL-SCNC: 125 MMOL/L (ref 136–145)
SODIUM SERPL-SCNC: 126 MMOL/L (ref 136–145)
SODIUM SERPL-SCNC: 127 MMOL/L (ref 136–145)
SODIUM SERPL-SCNC: 127 MMOL/L (ref 136–145)
SODIUM SERPL-SCNC: 128 MMOL/L
SODIUM SERPL-SCNC: 128 MMOL/L
SODIUM SERPL-SCNC: 128 MMOL/L (ref 136–145)
SODIUM SERPL-SCNC: 130 MMOL/L (ref 136–145)
SODIUM SERPL-SCNC: 131 MMOL/L (ref 136–145)
SODIUM SERPL-SCNC: 132 MMOL/L (ref 136–145)
SODIUM SERPL-SCNC: 133 MMOL/L
SODIUM SERPL-SCNC: 133 MMOL/L (ref 136–145)
SODIUM SERPL-SCNC: 134 MMOL/L (ref 136–145)
SODIUM SERPL-SCNC: 135 MMOL/L
SODIUM SERPL-SCNC: 135 MMOL/L
SODIUM SERPL-SCNC: 135 MMOL/L (ref 136–145)
SODIUM SERPL-SCNC: 136 MMOL/L (ref 136–145)
SODIUM SERPL-SCNC: 137 MMOL/L (ref 136–145)
SODIUM SERPL-SCNC: 138 MMOL/L
SODIUM SERPL-SCNC: 138 MMOL/L (ref 136–145)
SODIUM SERPL-SCNC: 139 MMOL/L
SODIUM SERPL-SCNC: 139 MMOL/L (ref 136–145)
SODIUM SERPL-SCNC: 140 MMOL/L (ref 136–145)
SODIUM SERPL-SCNC: 141 MMOL/L (ref 136–145)
SODIUM SERPL-SCNC: 142 MMOL/L (ref 136–145)
SP02: 100
SP02: 92
SP02: 95
SP02: 96
SP02: 98
SP02: 98
SP02: 99
SPECIMEN SOURCE: NORMAL
SPECIMEN SOURCE: NORMAL
SPONT RATE: 18
SPONT RATE: 22
SPONT RATE: 23
SPONT RATE: 30
STJ: 2.99 CM
STJ: 3.33 CM
STJ: 3.35 CM
STJ: 3.57 CM
TARGETS BLD QL SMEAR: ABNORMAL
TDI LATERAL: 0.05 M/S
TDI LATERAL: 0.09
TDI LATERAL: 0.1 M/S
TDI SEPTAL: 0.05 M/S
TDI SEPTAL: 0.08
TDI: 0.05 M/S
TDI: 0.09
TOTAL IRON BINDING CAPACITY: 289 UG/DL (ref 250–450)
TOXIC GRANULES BLD QL SMEAR: ABNORMAL
TOXIC GRANULES BLD QL SMEAR: PRESENT
TR MAX PG: 41.47 MMHG
TR MAX PG: 44 MMHG
TR MAX PG: 50 MMHG
TRANS ERYTHROCYTES VOL PATIENT: NORMAL ML
TRANSFERRIN SERPL-MCNC: 195 MG/DL (ref 200–375)
TRICUSPID ANNULAR PLANE SYSTOLIC EXCURSION: 1.26 CM
TRICUSPID ANNULAR PLANE SYSTOLIC EXCURSION: 1.57 CM
TRICUSPID ANNULAR PLANE SYSTOLIC EXCURSION: 1.8 CM
TRICUSPID ANNULAR PLANE SYSTOLIC EXCURSION: 2.1 CM
TRIGL SERPL-MCNC: 104 MG/DL (ref 30–150)
TROPONIN I SERPL DL<=0.01 NG/ML-MCNC: 0.07 NG/ML (ref 0–0.03)
TROPONIN I SERPL DL<=0.01 NG/ML-MCNC: 0.12 NG/ML (ref 0–0.03)
TROPONIN I SERPL DL<=0.01 NG/ML-MCNC: 0.13 NG/ML (ref 0–0.03)
TROPONIN I SERPL DL<=0.01 NG/ML-MCNC: 0.14 NG/ML
TROPONIN I SERPL DL<=0.01 NG/ML-MCNC: 0.15 NG/ML (ref 0–0.03)
TROPONIN I SERPL DL<=0.01 NG/ML-MCNC: 0.16 NG/ML (ref 0–0.03)
TROPONIN I SERPL DL<=0.01 NG/ML-MCNC: 0.17 NG/ML
TROPONIN I SERPL DL<=0.01 NG/ML-MCNC: 0.18 NG/ML (ref 0–0.03)
TROPONIN I SERPL DL<=0.01 NG/ML-MCNC: 0.19 NG/ML (ref 0–0.03)
TROPONIN I SERPL DL<=0.01 NG/ML-MCNC: 0.29 NG/ML (ref 0–0.03)
TROPONIN I SERPL DL<=0.01 NG/ML-MCNC: 0.29 NG/ML (ref 0–0.03)
TSH SERPL DL<=0.005 MIU/L-ACNC: 0.81 UIU/ML (ref 0.4–4)
TSH SERPL DL<=0.005 MIU/L-ACNC: 1.54 UIU/ML
TSH SERPL DL<=0.005 MIU/L-ACNC: 3.34 UIU/ML (ref 0.4–4)
TV REST PULMONARY ARTERY PRESSURE: 56 MMHG
TV REST PULMONARY ARTERY PRESSURE: 59 MMHG
VANCOMYCIN SERPL-MCNC: 17.2 UG/ML
VANCOMYCIN SERPL-MCNC: 20.1 UG/ML
VANCOMYCIN SERPL-MCNC: 21.3 UG/ML
VANCOMYCIN SERPL-MCNC: 22.8 UG/ML
VANCOMYCIN SERPL-MCNC: 23.3 UG/ML
VANCOMYCIN SERPL-MCNC: 25.5 UG/ML
VANCOMYCIN SERPL-MCNC: 26.2 UG/ML
VANCOMYCIN SERPL-MCNC: 35.3 UG/ML
VANCOMYCIN SERPL-MCNC: 51.5 UG/ML
VANCOMYCIN SERPL-MCNC: >88 UG/ML
VERBAL RESULT READBACK PERFORMED: YES
VIT B12 SERPL-MCNC: 994 PG/ML (ref 210–950)
VT: 390
VT: 400
WBC # BLD AUTO: 10.06 K/UL (ref 3.9–12.7)
WBC # BLD AUTO: 10.18 K/UL
WBC # BLD AUTO: 10.42 K/UL (ref 3.9–12.7)
WBC # BLD AUTO: 10.55 K/UL
WBC # BLD AUTO: 10.9 K/UL (ref 3.9–12.7)
WBC # BLD AUTO: 10.9 K/UL (ref 3.9–12.7)
WBC # BLD AUTO: 10.97 K/UL (ref 3.9–12.7)
WBC # BLD AUTO: 11.04 K/UL (ref 3.9–12.7)
WBC # BLD AUTO: 11.22 K/UL (ref 3.9–12.7)
WBC # BLD AUTO: 11.37 K/UL
WBC # BLD AUTO: 11.83 K/UL (ref 3.9–12.7)
WBC # BLD AUTO: 12 K/UL (ref 3.9–12.7)
WBC # BLD AUTO: 12.05 K/UL (ref 3.9–12.7)
WBC # BLD AUTO: 12.17 K/UL (ref 3.9–12.7)
WBC # BLD AUTO: 12.48 K/UL (ref 3.9–12.7)
WBC # BLD AUTO: 12.64 K/UL (ref 3.9–12.7)
WBC # BLD AUTO: 12.89 K/UL (ref 3.9–12.7)
WBC # BLD AUTO: 13.11 K/UL (ref 3.9–12.7)
WBC # BLD AUTO: 13.37 K/UL (ref 3.9–12.7)
WBC # BLD AUTO: 13.37 K/UL (ref 3.9–12.7)
WBC # BLD AUTO: 13.58 K/UL (ref 3.9–12.7)
WBC # BLD AUTO: 13.63 K/UL (ref 3.9–12.7)
WBC # BLD AUTO: 13.89 K/UL (ref 3.9–12.7)
WBC # BLD AUTO: 13.99 K/UL (ref 3.9–12.7)
WBC # BLD AUTO: 14.02 K/UL (ref 3.9–12.7)
WBC # BLD AUTO: 14.21 K/UL (ref 3.9–12.7)
WBC # BLD AUTO: 14.69 K/UL (ref 3.9–12.7)
WBC # BLD AUTO: 14.84 K/UL (ref 3.9–12.7)
WBC # BLD AUTO: 14.97 K/UL (ref 3.9–12.7)
WBC # BLD AUTO: 15.04 K/UL (ref 3.9–12.7)
WBC # BLD AUTO: 15.05 K/UL (ref 3.9–12.7)
WBC # BLD AUTO: 15.64 K/UL (ref 3.9–12.7)
WBC # BLD AUTO: 15.74 K/UL (ref 3.9–12.7)
WBC # BLD AUTO: 15.86 K/UL (ref 3.9–12.7)
WBC # BLD AUTO: 15.98 K/UL (ref 3.9–12.7)
WBC # BLD AUTO: 16.03 K/UL (ref 3.9–12.7)
WBC # BLD AUTO: 16.15 K/UL (ref 3.9–12.7)
WBC # BLD AUTO: 16.52 K/UL (ref 3.9–12.7)
WBC # BLD AUTO: 16.79 K/UL (ref 3.9–12.7)
WBC # BLD AUTO: 16.97 K/UL (ref 3.9–12.7)
WBC # BLD AUTO: 16.98 K/UL (ref 3.9–12.7)
WBC # BLD AUTO: 17.21 K/UL (ref 3.9–12.7)
WBC # BLD AUTO: 17.27 K/UL (ref 3.9–12.7)
WBC # BLD AUTO: 17.39 K/UL (ref 3.9–12.7)
WBC # BLD AUTO: 17.41 K/UL
WBC # BLD AUTO: 17.61 K/UL (ref 3.9–12.7)
WBC # BLD AUTO: 17.75 K/UL (ref 3.9–12.7)
WBC # BLD AUTO: 17.88 K/UL (ref 3.9–12.7)
WBC # BLD AUTO: 18.03 K/UL (ref 3.9–12.7)
WBC # BLD AUTO: 18.19 K/UL (ref 3.9–12.7)
WBC # BLD AUTO: 18.21 K/UL (ref 3.9–12.7)
WBC # BLD AUTO: 18.72 K/UL (ref 3.9–12.7)
WBC # BLD AUTO: 18.99 K/UL (ref 3.9–12.7)
WBC # BLD AUTO: 21.33 K/UL (ref 3.9–12.7)
WBC # BLD AUTO: 21.97 K/UL (ref 3.9–12.7)
WBC # BLD AUTO: 22.1 K/UL (ref 3.9–12.7)
WBC # BLD AUTO: 23.42 K/UL (ref 3.9–12.7)
WBC # BLD AUTO: 24.79 K/UL (ref 3.9–12.7)
WBC # BLD AUTO: 25.88 K/UL (ref 3.9–12.7)
WBC # BLD AUTO: 26.86 K/UL (ref 3.9–12.7)
WBC # BLD AUTO: 34.1 K/UL (ref 3.9–12.7)
WBC # BLD AUTO: 39.7 K/UL (ref 3.9–12.7)
WBC # BLD AUTO: 44.56 K/UL (ref 3.9–12.7)
WBC # BLD AUTO: 45.07 K/UL (ref 3.9–12.7)
WBC # BLD AUTO: 46.97 K/UL (ref 3.9–12.7)
WBC # BLD AUTO: 49.07 K/UL (ref 3.9–12.7)
WBC # BLD AUTO: 5.99 K/UL (ref 3.9–12.7)
WBC # BLD AUTO: 7.14 K/UL (ref 3.9–12.7)
WBC # BLD AUTO: 7.97 K/UL
WBC # BLD AUTO: 8.63 K/UL (ref 3.9–12.7)
WBC # BLD AUTO: 9.06 K/UL (ref 3.9–12.7)
WBC # BLD AUTO: 9.08 K/UL
WBC # BLD AUTO: 9.19 K/UL (ref 3.9–12.7)
WBC # BLD AUTO: 9.3 K/UL
WBC # BLD AUTO: 9.85 K/UL (ref 3.9–12.7)
WBC # FLD: 990 /CU MM
WBC TOXIC VACUOLES BLD QL SMEAR: ABNORMAL
WBC TOXIC VACUOLES BLD QL SMEAR: PRESENT

## 2019-01-01 PROCEDURE — 99232 SBSQ HOSP IP/OBS MODERATE 35: CPT | Mod: ,,, | Performed by: HOSPITALIST

## 2019-01-01 PROCEDURE — 99291 PR CRITICAL CARE, E/M 30-74 MINUTES: ICD-10-PCS | Mod: ,,, | Performed by: INTERNAL MEDICINE

## 2019-01-01 PROCEDURE — 25000003 PHARM REV CODE 250: Performed by: STUDENT IN AN ORGANIZED HEALTH CARE EDUCATION/TRAINING PROGRAM

## 2019-01-01 PROCEDURE — 85610 PROTHROMBIN TIME: CPT

## 2019-01-01 PROCEDURE — 97116 GAIT TRAINING THERAPY: CPT

## 2019-01-01 PROCEDURE — 27000221 HC OXYGEN, UP TO 24 HOURS

## 2019-01-01 PROCEDURE — 80053 COMPREHEN METABOLIC PANEL: CPT

## 2019-01-01 PROCEDURE — 25000003 PHARM REV CODE 250: Performed by: NURSE PRACTITIONER

## 2019-01-01 PROCEDURE — 63600175 PHARM REV CODE 636 W HCPCS: Performed by: INTERNAL MEDICINE

## 2019-01-01 PROCEDURE — 94761 N-INVAS EAR/PLS OXIMETRY MLT: CPT

## 2019-01-01 PROCEDURE — 93990 PR DUPLEX HEMODIALYSIS ACCESS: ICD-10-PCS | Mod: S$GLB,,, | Performed by: SURGERY

## 2019-01-01 PROCEDURE — 94003 VENT MGMT INPAT SUBQ DAY: CPT

## 2019-01-01 PROCEDURE — 99999 PR PBB SHADOW E&M-EST. PATIENT-LVL II: CPT | Mod: PBBFAC,,, | Performed by: NURSE PRACTITIONER

## 2019-01-01 PROCEDURE — 20000000 HC ICU ROOM

## 2019-01-01 PROCEDURE — 11000001 HC ACUTE MED/SURG PRIVATE ROOM

## 2019-01-01 PROCEDURE — 99232 SBSQ HOSP IP/OBS MODERATE 35: CPT | Mod: GC,,, | Performed by: HOSPITALIST

## 2019-01-01 PROCEDURE — 63600175 PHARM REV CODE 636 W HCPCS: Performed by: STUDENT IN AN ORGANIZED HEALTH CARE EDUCATION/TRAINING PROGRAM

## 2019-01-01 PROCEDURE — 93010 ELECTROCARDIOGRAM REPORT: CPT | Mod: ,,, | Performed by: INTERNAL MEDICINE

## 2019-01-01 PROCEDURE — 63600175 PHARM REV CODE 636 W HCPCS: Performed by: NURSE PRACTITIONER

## 2019-01-01 PROCEDURE — 99232 SBSQ HOSP IP/OBS MODERATE 35: CPT | Mod: ,,, | Performed by: NURSE PRACTITIONER

## 2019-01-01 PROCEDURE — S5571 INSULIN DISPOS PEN 3 ML: HCPCS | Performed by: STUDENT IN AN ORGANIZED HEALTH CARE EDUCATION/TRAINING PROGRAM

## 2019-01-01 PROCEDURE — 90945 DIALYSIS ONE EVALUATION: CPT

## 2019-01-01 PROCEDURE — 85025 COMPLETE CBC W/AUTO DIFF WBC: CPT

## 2019-01-01 PROCEDURE — 99499 UNLISTED E&M SERVICE: CPT | Mod: S$GLB,,, | Performed by: PODIATRIST

## 2019-01-01 PROCEDURE — 80100016 HC MAINTENANCE HEMODIALYSIS

## 2019-01-01 PROCEDURE — 94002 VENT MGMT INPAT INIT DAY: CPT

## 2019-01-01 PROCEDURE — 99900035 HC TECH TIME PER 15 MIN (STAT)

## 2019-01-01 PROCEDURE — 97165 OT EVAL LOW COMPLEX 30 MIN: CPT

## 2019-01-01 PROCEDURE — 99285 EMERGENCY DEPT VISIT HI MDM: CPT | Mod: 25

## 2019-01-01 PROCEDURE — 99291 CRITICAL CARE FIRST HOUR: CPT | Mod: 25,,, | Performed by: INTERNAL MEDICINE

## 2019-01-01 PROCEDURE — 99499 RISK ADDL DX/OHS AUDIT: ICD-10-PCS | Mod: S$GLB,,, | Performed by: SURGERY

## 2019-01-01 PROCEDURE — 25000003 PHARM REV CODE 250: Performed by: HOSPITALIST

## 2019-01-01 PROCEDURE — 99999 PR PBB SHADOW E&M-EST. PATIENT-LVL IV: ICD-10-PCS | Mod: PBBFAC,,, | Performed by: PODIATRIST

## 2019-01-01 PROCEDURE — 80069 RENAL FUNCTION PANEL: CPT | Mod: 91

## 2019-01-01 PROCEDURE — 99231 SBSQ HOSP IP/OBS SF/LOW 25: CPT | Mod: GC,,, | Performed by: HOSPITALIST

## 2019-01-01 PROCEDURE — 99285 PR EMERGENCY DEPT VISIT,LEVEL V: ICD-10-PCS | Mod: ,,, | Performed by: EMERGENCY MEDICINE

## 2019-01-01 PROCEDURE — 84484 ASSAY OF TROPONIN QUANT: CPT | Mod: 91

## 2019-01-01 PROCEDURE — 80048 BASIC METABOLIC PNL TOTAL CA: CPT

## 2019-01-01 PROCEDURE — 99900026 HC AIRWAY MAINTENANCE (STAT)

## 2019-01-01 PROCEDURE — 99291 CRITICAL CARE FIRST HOUR: CPT | Mod: ,,, | Performed by: INTERNAL MEDICINE

## 2019-01-01 PROCEDURE — 90945 PR DIALYSIS, NOT HEMO, 1 EVAL: ICD-10-PCS | Mod: ,,, | Performed by: INTERNAL MEDICINE

## 2019-01-01 PROCEDURE — 80074 ACUTE HEPATITIS PANEL: CPT

## 2019-01-01 PROCEDURE — 80100014 HC HEMODIALYSIS 1:1

## 2019-01-01 PROCEDURE — 99231 PR SUBSEQUENT HOSPITAL CARE,LEVL I: ICD-10-PCS | Mod: GC,,, | Performed by: HOSPITALIST

## 2019-01-01 PROCEDURE — 80053 COMPREHEN METABOLIC PANEL: CPT | Mod: 91

## 2019-01-01 PROCEDURE — 97530 THERAPEUTIC ACTIVITIES: CPT

## 2019-01-01 PROCEDURE — 99232 PR SUBSEQUENT HOSPITAL CARE,LEVL II: ICD-10-PCS | Mod: ,,, | Performed by: INTERNAL MEDICINE

## 2019-01-01 PROCEDURE — D9220A PRA ANESTHESIA: Mod: ANES,,, | Performed by: ANESTHESIOLOGY

## 2019-01-01 PROCEDURE — 93793 PR ANTICOAGULANT MGMT FOR PT TAKING WARFARIN: ICD-10-PCS | Mod: S$GLB,,,

## 2019-01-01 PROCEDURE — 99285 EMERGENCY DEPT VISIT HI MDM: CPT | Mod: ,,, | Performed by: EMERGENCY MEDICINE

## 2019-01-01 PROCEDURE — 99232 SBSQ HOSP IP/OBS MODERATE 35: CPT | Mod: ,,, | Performed by: EMERGENCY MEDICINE

## 2019-01-01 PROCEDURE — 36415 COLL VENOUS BLD VENIPUNCTURE: CPT

## 2019-01-01 PROCEDURE — C1769 GUIDE WIRE: HCPCS | Performed by: SURGERY

## 2019-01-01 PROCEDURE — 99214 PR OFFICE/OUTPT VISIT, EST, LEVL IV, 30-39 MIN: ICD-10-PCS | Mod: S$GLB,,, | Performed by: SURGERY

## 2019-01-01 PROCEDURE — 36430 TRANSFUSION BLD/BLD COMPNT: CPT

## 2019-01-01 PROCEDURE — 11042 PR DEBRIDEMENT, SKIN, SUB-Q TISSUE,=<20 SQ CM: ICD-10-PCS | Mod: S$GLB,,, | Performed by: PODIATRIST

## 2019-01-01 PROCEDURE — 25000003 PHARM REV CODE 250: Performed by: INTERNAL MEDICINE

## 2019-01-01 PROCEDURE — 99214 OFFICE O/P EST MOD 30 MIN: CPT | Mod: S$GLB,,, | Performed by: SURGERY

## 2019-01-01 PROCEDURE — 3008F PR BODY MASS INDEX (BMI) DOCUMENTED: ICD-10-PCS | Mod: CPTII,S$GLB,, | Performed by: INTERNAL MEDICINE

## 2019-01-01 PROCEDURE — 80100008 HC CRRT DAILY MAINTENANCE

## 2019-01-01 PROCEDURE — 27000190 HC CPAP FULL FACE MASK W/VALVE

## 2019-01-01 PROCEDURE — 99213 OFFICE O/P EST LOW 20 MIN: CPT | Mod: S$GLB,,, | Performed by: NURSE PRACTITIONER

## 2019-01-01 PROCEDURE — 99291 PR CRITICAL CARE, E/M 30-74 MINUTES: ICD-10-PCS | Mod: 25,,, | Performed by: EMERGENCY MEDICINE

## 2019-01-01 PROCEDURE — 99232 PR SUBSEQUENT HOSPITAL CARE,LEVL II: ICD-10-PCS | Mod: ,,, | Performed by: PHYSICIAN ASSISTANT

## 2019-01-01 PROCEDURE — 96374 THER/PROPH/DIAG INJ IV PUSH: CPT

## 2019-01-01 PROCEDURE — 99999 PR PBB SHADOW E&M-EST. PATIENT-LVL III: CPT | Mod: PBBFAC,,, | Performed by: NURSE PRACTITIONER

## 2019-01-01 PROCEDURE — 63600175 PHARM REV CODE 636 W HCPCS: Performed by: SURGERY

## 2019-01-01 PROCEDURE — 27200966 HC CLOSED SUCTION SYSTEM

## 2019-01-01 PROCEDURE — 99233 SBSQ HOSP IP/OBS HIGH 50: CPT | Mod: GC,,, | Performed by: INTERNAL MEDICINE

## 2019-01-01 PROCEDURE — 86703 HIV-1/HIV-2 1 RESULT ANTBDY: CPT

## 2019-01-01 PROCEDURE — 97110 THERAPEUTIC EXERCISES: CPT

## 2019-01-01 PROCEDURE — 99233 SBSQ HOSP IP/OBS HIGH 50: CPT | Mod: ,,, | Performed by: INTERNAL MEDICINE

## 2019-01-01 PROCEDURE — 83615 LACTATE (LD) (LDH) ENZYME: CPT

## 2019-01-01 PROCEDURE — 87205 SMEAR GRAM STAIN: CPT

## 2019-01-01 PROCEDURE — 29580 STRAPPING UNNA BOOT: CPT | Mod: RT,S$GLB,, | Performed by: NURSE PRACTITIONER

## 2019-01-01 PROCEDURE — 83735 ASSAY OF MAGNESIUM: CPT | Mod: 91

## 2019-01-01 PROCEDURE — 80069 RENAL FUNCTION PANEL: CPT

## 2019-01-01 PROCEDURE — 11000004 HC SNF PRIVATE

## 2019-01-01 PROCEDURE — 99999 PR PBB SHADOW E&M-EST. PATIENT-LVL III: CPT | Mod: PBBFAC,,, | Performed by: PODIATRIST

## 2019-01-01 PROCEDURE — 36000706: Performed by: SURGERY

## 2019-01-01 PROCEDURE — 99232 PR SUBSEQUENT HOSPITAL CARE,LEVL II: ICD-10-PCS | Mod: ,,, | Performed by: NURSE PRACTITIONER

## 2019-01-01 PROCEDURE — 83605 ASSAY OF LACTIC ACID: CPT

## 2019-01-01 PROCEDURE — 86901 BLOOD TYPING SEROLOGIC RH(D): CPT

## 2019-01-01 PROCEDURE — 96375 TX/PRO/DX INJ NEW DRUG ADDON: CPT

## 2019-01-01 PROCEDURE — 36902 PR INTRO CATH, DIALYSIS CIRCUIT W/TRANSLML BALLOON ANGIO: ICD-10-PCS | Mod: ,,, | Performed by: SURGERY

## 2019-01-01 PROCEDURE — 93793 ANTICOAG MGMT PT WARFARIN: CPT | Mod: S$GLB,,,

## 2019-01-01 PROCEDURE — A4216 STERILE WATER/SALINE, 10 ML: HCPCS | Performed by: STUDENT IN AN ORGANIZED HEALTH CARE EDUCATION/TRAINING PROGRAM

## 2019-01-01 PROCEDURE — 85730 THROMBOPLASTIN TIME PARTIAL: CPT | Mod: 91

## 2019-01-01 PROCEDURE — 94640 AIRWAY INHALATION TREATMENT: CPT

## 2019-01-01 PROCEDURE — 73130 X-RAY EXAM OF HAND: CPT | Mod: 26,RT,, | Performed by: RADIOLOGY

## 2019-01-01 PROCEDURE — 82962 GLUCOSE BLOOD TEST: CPT

## 2019-01-01 PROCEDURE — 85610 POCT INR: ICD-10-PCS | Mod: QW,S$GLB,, | Performed by: PHARMACIST

## 2019-01-01 PROCEDURE — P9021 RED BLOOD CELLS UNIT: HCPCS

## 2019-01-01 PROCEDURE — 99232 PR SUBSEQUENT HOSPITAL CARE,LEVL II: ICD-10-PCS | Mod: GC,,, | Performed by: HOSPITALIST

## 2019-01-01 PROCEDURE — 82803 BLOOD GASES ANY COMBINATION: CPT

## 2019-01-01 PROCEDURE — 63600175 PHARM REV CODE 636 W HCPCS: Performed by: HOSPITALIST

## 2019-01-01 PROCEDURE — 84100 ASSAY OF PHOSPHORUS: CPT

## 2019-01-01 PROCEDURE — 36903 PR INTRO CATH, DIALYSIS CIRCUIT W/TRANSCATH PLCMNT, STENT: ICD-10-PCS | Mod: ,,, | Performed by: SURGERY

## 2019-01-01 PROCEDURE — 25000003 PHARM REV CODE 250

## 2019-01-01 PROCEDURE — 83036 HEMOGLOBIN GLYCOSYLATED A1C: CPT

## 2019-01-01 PROCEDURE — 97535 SELF CARE MNGMENT TRAINING: CPT

## 2019-01-01 PROCEDURE — 99291 CRITICAL CARE FIRST HOUR: CPT | Mod: 25,,, | Performed by: EMERGENCY MEDICINE

## 2019-01-01 PROCEDURE — 99225 PR SUBSEQUENT OBSERVATION CARE,LEVEL II: CPT | Mod: ,,, | Performed by: PHYSICIAN ASSISTANT

## 2019-01-01 PROCEDURE — 31500 INSERT EMERGENCY AIRWAY: CPT | Mod: ,,, | Performed by: INTERNAL MEDICINE

## 2019-01-01 PROCEDURE — 99232 PR SUBSEQUENT HOSPITAL CARE,LEVL II: ICD-10-PCS | Mod: ,,, | Performed by: EMERGENCY MEDICINE

## 2019-01-01 PROCEDURE — 90935 PR HEMODIALYSIS, ONE EVALUATION: ICD-10-PCS | Mod: ,,, | Performed by: NURSE PRACTITIONER

## 2019-01-01 PROCEDURE — 99306 PR NURSING FACILITY CARE, INIT, HIGH SEVERITY: ICD-10-PCS | Mod: AI,,, | Performed by: INTERNAL MEDICINE

## 2019-01-01 PROCEDURE — 36903 INTRO CATH DIALYSIS CIRCUIT: CPT | Performed by: SURGERY

## 2019-01-01 PROCEDURE — 83880 ASSAY OF NATRIURETIC PEPTIDE: CPT

## 2019-01-01 PROCEDURE — 83735 ASSAY OF MAGNESIUM: CPT

## 2019-01-01 PROCEDURE — 99233 PR SUBSEQUENT HOSPITAL CARE,LEVL III: ICD-10-PCS | Mod: ,,, | Performed by: INTERNAL MEDICINE

## 2019-01-01 PROCEDURE — 84100 ASSAY OF PHOSPHORUS: CPT | Mod: 91

## 2019-01-01 PROCEDURE — G0378 HOSPITAL OBSERVATION PER HR: HCPCS

## 2019-01-01 PROCEDURE — 99999 PR PBB SHADOW E&M-EST. PATIENT-LVL IV: ICD-10-PCS | Mod: PBBFAC,,, | Performed by: INTERNAL MEDICINE

## 2019-01-01 PROCEDURE — 99222 PR INITIAL HOSPITAL CARE,LEVL II: ICD-10-PCS | Mod: GC,,, | Performed by: INTERNAL MEDICINE

## 2019-01-01 PROCEDURE — 99285 EMERGENCY DEPT VISIT HI MDM: CPT

## 2019-01-01 PROCEDURE — 99499 UNLISTED E&M SERVICE: CPT | Mod: S$GLB,,, | Performed by: NURSE PRACTITIONER

## 2019-01-01 PROCEDURE — 90945 DIALYSIS ONE EVALUATION: CPT | Mod: GC,,, | Performed by: INTERNAL MEDICINE

## 2019-01-01 PROCEDURE — 3008F BODY MASS INDEX DOCD: CPT | Mod: CPTII,S$GLB,, | Performed by: INTERNAL MEDICINE

## 2019-01-01 PROCEDURE — 36903 INTRO CATH DIALYSIS CIRCUIT: CPT | Mod: ,,, | Performed by: SURGERY

## 2019-01-01 PROCEDURE — 11042 DBRDMT SUBQ TIS 1ST 20SQCM/<: CPT | Mod: S$GLB,,, | Performed by: PODIATRIST

## 2019-01-01 PROCEDURE — 63600531 PHARM REV CODE 636 NO ALT 250 W HCPCS: Mod: JG | Performed by: EMERGENCY MEDICINE

## 2019-01-01 PROCEDURE — 96365 THER/PROPH/DIAG IV INF INIT: CPT

## 2019-01-01 PROCEDURE — 83605 ASSAY OF LACTIC ACID: CPT | Mod: 91

## 2019-01-01 PROCEDURE — 99291 CRITICAL CARE FIRST HOUR: CPT | Mod: ,,, | Performed by: EMERGENCY MEDICINE

## 2019-01-01 PROCEDURE — 90935 HEMODIALYSIS ONE EVALUATION: CPT | Mod: ,,, | Performed by: NURSE PRACTITIONER

## 2019-01-01 PROCEDURE — 99223 1ST HOSP IP/OBS HIGH 75: CPT | Mod: ,,, | Performed by: INTERNAL MEDICINE

## 2019-01-01 PROCEDURE — 93010 ELECTROCARDIOGRAM REPORT: CPT | Mod: 76,,, | Performed by: INTERNAL MEDICINE

## 2019-01-01 PROCEDURE — 93005 ELECTROCARDIOGRAM TRACING: CPT

## 2019-01-01 PROCEDURE — 99232 PR SUBSEQUENT HOSPITAL CARE,LEVL II: ICD-10-PCS | Mod: ,,, | Performed by: HOSPITALIST

## 2019-01-01 PROCEDURE — 36556 INSERT NON-TUNNEL CV CATH: CPT | Mod: ,,, | Performed by: NURSE PRACTITIONER

## 2019-01-01 PROCEDURE — 36902 INTRO CATH DIALYSIS CIRCUIT: CPT | Performed by: SURGERY

## 2019-01-01 PROCEDURE — 99999 PR PBB SHADOW E&M-EST. PATIENT-LVL III: ICD-10-PCS | Mod: PBBFAC,,, | Performed by: NURSE PRACTITIONER

## 2019-01-01 PROCEDURE — 90945 DIALYSIS ONE EVALUATION: CPT | Mod: ,,, | Performed by: NURSE PRACTITIONER

## 2019-01-01 PROCEDURE — 90935 HEMODIALYSIS ONE EVALUATION: CPT

## 2019-01-01 PROCEDURE — 99232 SBSQ HOSP IP/OBS MODERATE 35: CPT | Mod: ,,, | Performed by: INTERNAL MEDICINE

## 2019-01-01 PROCEDURE — 63600175 PHARM REV CODE 636 W HCPCS: Performed by: PHYSICIAN ASSISTANT

## 2019-01-01 PROCEDURE — 99999 PR PBB SHADOW E&M-EST. PATIENT-LVL IV: CPT | Mod: PBBFAC,,, | Performed by: NURSE PRACTITIONER

## 2019-01-01 PROCEDURE — 99212 PR OFFICE/OUTPT VISIT, EST, LEVL II, 10-19 MIN: ICD-10-PCS | Mod: S$GLB,,, | Performed by: NURSE PRACTITIONER

## 2019-01-01 PROCEDURE — 99233 PR SUBSEQUENT HOSPITAL CARE,LEVL III: ICD-10-PCS | Mod: GC,,, | Performed by: INTERNAL MEDICINE

## 2019-01-01 PROCEDURE — 99499 NO LOS: ICD-10-PCS | Mod: S$GLB,,, | Performed by: NURSE PRACTITIONER

## 2019-01-01 PROCEDURE — 25000003 PHARM REV CODE 250: Performed by: EMERGENCY MEDICINE

## 2019-01-01 PROCEDURE — 63600175 PHARM REV CODE 636 W HCPCS: Performed by: EMERGENCY MEDICINE

## 2019-01-01 PROCEDURE — 96361 HYDRATE IV INFUSION ADD-ON: CPT

## 2019-01-01 PROCEDURE — 90935 HEMODIALYSIS ONE EVALUATION: CPT | Mod: ,,, | Performed by: INTERNAL MEDICINE

## 2019-01-01 PROCEDURE — 99999 PR PBB SHADOW E&M-EST. PATIENT-LVL II: CPT | Mod: PBBFAC,,, | Performed by: ORTHOPAEDIC SURGERY

## 2019-01-01 PROCEDURE — 99211 PR OFFICE/OUTPT VISIT, EST, LEVL I: ICD-10-PCS | Mod: 25,S$GLB,, | Performed by: INTERNAL MEDICINE

## 2019-01-01 PROCEDURE — 85730 THROMBOPLASTIN TIME PARTIAL: CPT

## 2019-01-01 PROCEDURE — 99999 PR PBB SHADOW E&M-EST. PATIENT-LVL IV: ICD-10-PCS | Mod: PBBFAC,,, | Performed by: SURGERY

## 2019-01-01 PROCEDURE — 99291 CRITICAL CARE FIRST HOUR: CPT | Mod: GC,,, | Performed by: INTERNAL MEDICINE

## 2019-01-01 PROCEDURE — 36902 INTRO CATH DIALYSIS CIRCUIT: CPT | Mod: ,,, | Performed by: SURGERY

## 2019-01-01 PROCEDURE — 94010 BREATHING CAPACITY TEST: CPT | Mod: S$GLB,,, | Performed by: INTERNAL MEDICINE

## 2019-01-01 PROCEDURE — 3008F BODY MASS INDEX DOCD: CPT | Mod: CPTII,S$GLB,, | Performed by: PSYCHIATRY & NEUROLOGY

## 2019-01-01 PROCEDURE — 99215 OFFICE O/P EST HI 40 MIN: CPT | Mod: S$GLB,,, | Performed by: PSYCHIATRY & NEUROLOGY

## 2019-01-01 PROCEDURE — 99306 1ST NF CARE HIGH MDM 50: CPT | Mod: AI,,, | Performed by: INTERNAL MEDICINE

## 2019-01-01 PROCEDURE — 63600175 PHARM REV CODE 636 W HCPCS: Performed by: NURSE ANESTHETIST, CERTIFIED REGISTERED

## 2019-01-01 PROCEDURE — 87340 HEPATITIS B SURFACE AG IA: CPT

## 2019-01-01 PROCEDURE — 90945 DIALYSIS ONE EVALUATION: CPT | Mod: ,,, | Performed by: INTERNAL MEDICINE

## 2019-01-01 PROCEDURE — 3008F PR BODY MASS INDEX (BMI) DOCUMENTED: ICD-10-PCS | Mod: CPTII,S$GLB,, | Performed by: ORTHOPAEDIC SURGERY

## 2019-01-01 PROCEDURE — 25000003 PHARM REV CODE 250: Performed by: NURSE ANESTHETIST, CERTIFIED REGISTERED

## 2019-01-01 PROCEDURE — 82962 GLUCOSE BLOOD TEST: CPT | Performed by: SURGERY

## 2019-01-01 PROCEDURE — 93010 EKG 12-LEAD: ICD-10-PCS | Mod: ,,, | Performed by: INTERNAL MEDICINE

## 2019-01-01 PROCEDURE — 3008F BODY MASS INDEX DOCD: CPT | Mod: CPTII,S$GLB,, | Performed by: NURSE PRACTITIONER

## 2019-01-01 PROCEDURE — 99231 SBSQ HOSP IP/OBS SF/LOW 25: CPT | Mod: ,,, | Performed by: HOSPITALIST

## 2019-01-01 PROCEDURE — 11721 PR DEBRIDEMENT OF NAILS, 6 OR MORE: ICD-10-PCS | Mod: 59,Q7,S$GLB, | Performed by: PODIATRIST

## 2019-01-01 PROCEDURE — 99211 OFF/OP EST MAY X REQ PHY/QHP: CPT | Mod: 25,S$GLB,,

## 2019-01-01 PROCEDURE — 99152 MOD SED SAME PHYS/QHP 5/>YRS: CPT | Mod: ,,, | Performed by: SURGERY

## 2019-01-01 PROCEDURE — 94660 CPAP INITIATION&MGMT: CPT

## 2019-01-01 PROCEDURE — 85007 BL SMEAR W/DIFF WBC COUNT: CPT

## 2019-01-01 PROCEDURE — 99233 SBSQ HOSP IP/OBS HIGH 50: CPT | Mod: GC,,, | Performed by: EMERGENCY MEDICINE

## 2019-01-01 PROCEDURE — 85027 COMPLETE CBC AUTOMATED: CPT

## 2019-01-01 PROCEDURE — 99217 PR OBSERVATION CARE DISCHARGE: ICD-10-PCS | Mod: ,,, | Performed by: PHYSICIAN ASSISTANT

## 2019-01-01 PROCEDURE — 93990 DOPPLER FLOW TESTING: CPT | Mod: S$GLB,,, | Performed by: SURGERY

## 2019-01-01 PROCEDURE — 99239 PR HOSPITAL DISCHARGE DAY,>30 MIN: ICD-10-PCS | Mod: GC,,, | Performed by: HOSPITALIST

## 2019-01-01 PROCEDURE — 99499 UNLISTED E&M SERVICE: CPT | Mod: S$GLB,,, | Performed by: SURGERY

## 2019-01-01 PROCEDURE — 99223 PR INITIAL HOSPITAL CARE,LEVL III: ICD-10-PCS | Mod: AI,GC,, | Performed by: HOSPITALIST

## 2019-01-01 PROCEDURE — 84484 ASSAY OF TROPONIN QUANT: CPT

## 2019-01-01 PROCEDURE — 86235 NUCLEAR ANTIGEN ANTIBODY: CPT

## 2019-01-01 PROCEDURE — 36556 INSERT NON-TUNNEL CV CATH: CPT

## 2019-01-01 PROCEDURE — C2623 CATH, TRANSLUMIN, DRUG-COAT: HCPCS | Performed by: SURGERY

## 2019-01-01 PROCEDURE — 90935 PR HEMODIALYSIS, ONE EVALUATION: ICD-10-PCS | Mod: ,,, | Performed by: INTERNAL MEDICINE

## 2019-01-01 PROCEDURE — 3044F HG A1C LEVEL LT 7.0%: CPT | Mod: CPTII,S$GLB,, | Performed by: NURSE PRACTITIONER

## 2019-01-01 PROCEDURE — P9047 ALBUMIN (HUMAN), 25%, 50ML: HCPCS | Mod: JG | Performed by: GENERAL PRACTICE

## 2019-01-01 PROCEDURE — 99900058 HC 022 PAID UNDER SNF PPS

## 2019-01-01 PROCEDURE — 99223 PR INITIAL HOSPITAL CARE,LEVL III: ICD-10-PCS | Mod: ,,, | Performed by: INTERNAL MEDICINE

## 2019-01-01 PROCEDURE — 99231 PR SUBSEQUENT HOSPITAL CARE,LEVL I: ICD-10-PCS | Mod: ,,, | Performed by: HOSPITALIST

## 2019-01-01 PROCEDURE — 25000003 PHARM REV CODE 250: Performed by: GENERAL PRACTICE

## 2019-01-01 PROCEDURE — 37799 UNLISTED PX VASCULAR SURGERY: CPT

## 2019-01-01 PROCEDURE — 92610 EVALUATE SWALLOWING FUNCTION: CPT

## 2019-01-01 PROCEDURE — 97162 PT EVAL MOD COMPLEX 30 MIN: CPT

## 2019-01-01 PROCEDURE — 99215 PR OFFICE/OUTPT VISIT, EST, LEVL V, 40-54 MIN: ICD-10-PCS | Mod: S$GLB,,, | Performed by: PSYCHIATRY & NEUROLOGY

## 2019-01-01 PROCEDURE — 96367 TX/PROPH/DG ADDL SEQ IV INF: CPT

## 2019-01-01 PROCEDURE — 99232 SBSQ HOSP IP/OBS MODERATE 35: CPT | Mod: ,,, | Performed by: PHYSICIAN ASSISTANT

## 2019-01-01 PROCEDURE — 20600001 HC STEP DOWN PRIVATE ROOM

## 2019-01-01 PROCEDURE — 99499 NO LOS: ICD-10-PCS | Mod: S$GLB,,, | Performed by: PODIATRIST

## 2019-01-01 PROCEDURE — 92507 TX SP LANG VOICE COMM INDIV: CPT

## 2019-01-01 PROCEDURE — 86920 COMPATIBILITY TEST SPIN: CPT

## 2019-01-01 PROCEDURE — 99233 SBSQ HOSP IP/OBS HIGH 50: CPT | Mod: ,,, | Performed by: HOSPITALIST

## 2019-01-01 PROCEDURE — 82010 KETONE BODYS QUAN: CPT

## 2019-01-01 PROCEDURE — 3008F PR BODY MASS INDEX (BMI) DOCUMENTED: ICD-10-PCS | Mod: CPTII,S$GLB,, | Performed by: SURGERY

## 2019-01-01 PROCEDURE — 99217 PR OBSERVATION CARE DISCHARGE: CPT | Mod: ,,, | Performed by: PHYSICIAN ASSISTANT

## 2019-01-01 PROCEDURE — 99291 PR CRITICAL CARE, E/M 30-74 MINUTES: ICD-10-PCS | Mod: 25,,, | Performed by: INTERNAL MEDICINE

## 2019-01-01 PROCEDURE — 95822 EEG COMA OR SLEEP ONLY: CPT | Mod: 26,,, | Performed by: PSYCHIATRY & NEUROLOGY

## 2019-01-01 PROCEDURE — 27201423 OPTIME MED/SURG SUP & DEVICES STERILE SUPPLY: Performed by: SURGERY

## 2019-01-01 PROCEDURE — 99291 CRITICAL CARE FIRST HOUR: CPT | Mod: ,,, | Performed by: NURSE PRACTITIONER

## 2019-01-01 PROCEDURE — 36600 WITHDRAWAL OF ARTERIAL BLOOD: CPT

## 2019-01-01 PROCEDURE — 99999 PR PBB SHADOW E&M-EST. PATIENT-LVL IV: CPT | Mod: PBBFAC,,, | Performed by: SURGERY

## 2019-01-01 PROCEDURE — 85007 BL SMEAR W/DIFF WBC COUNT: CPT | Mod: 91

## 2019-01-01 PROCEDURE — 99291 PR CRITICAL CARE, E/M 30-74 MINUTES: ICD-10-PCS | Mod: ,,, | Performed by: EMERGENCY MEDICINE

## 2019-01-01 PROCEDURE — 99215 PR OFFICE/OUTPT VISIT, EST, LEVL V, 40-54 MIN: ICD-10-PCS | Mod: S$GLB,,, | Performed by: INTERNAL MEDICINE

## 2019-01-01 PROCEDURE — 3008F BODY MASS INDEX DOCD: CPT | Mod: CPTII,S$GLB,, | Performed by: SURGERY

## 2019-01-01 PROCEDURE — C1725 CATH, TRANSLUMIN NON-LASER: HCPCS | Performed by: SURGERY

## 2019-01-01 PROCEDURE — 25000242 PHARM REV CODE 250 ALT 637 W/ HCPCS: Performed by: INTERNAL MEDICINE

## 2019-01-01 PROCEDURE — 87070 CULTURE OTHR SPECIMN AEROBIC: CPT

## 2019-01-01 PROCEDURE — 99239 PR HOSPITAL DISCHARGE DAY,>30 MIN: ICD-10-PCS | Mod: ,,, | Performed by: HOSPITALIST

## 2019-01-01 PROCEDURE — 99999 PR PBB SHADOW E&M-EST. PATIENT-LVL IV: CPT | Mod: PBBFAC,,, | Performed by: PODIATRIST

## 2019-01-01 PROCEDURE — 87449 NOS EACH ORGANISM AG IA: CPT

## 2019-01-01 PROCEDURE — 82652 VIT D 1 25-DIHYDROXY: CPT

## 2019-01-01 PROCEDURE — 85610 POCT INR: ICD-10-PCS | Mod: QW,S$GLB,, | Performed by: INTERNAL MEDICINE

## 2019-01-01 PROCEDURE — 25500020 PHARM REV CODE 255: Performed by: SURGERY

## 2019-01-01 PROCEDURE — 99999 PR PBB SHADOW E&M-EST. PATIENT-LVL II: ICD-10-PCS | Mod: PBBFAC,,, | Performed by: NURSE PRACTITIONER

## 2019-01-01 PROCEDURE — D9220A PRA ANESTHESIA: Mod: CRNA,,, | Performed by: NURSE ANESTHETIST, CERTIFIED REGISTERED

## 2019-01-01 PROCEDURE — 99223 PR INITIAL HOSPITAL CARE,LEVL III: ICD-10-PCS | Mod: ,,, | Performed by: PODIATRIST

## 2019-01-01 PROCEDURE — 99223 1ST HOSP IP/OBS HIGH 75: CPT | Mod: GC,,, | Performed by: INTERNAL MEDICINE

## 2019-01-01 PROCEDURE — C1874 STENT, COATED/COV W/DEL SYS: HCPCS | Performed by: SURGERY

## 2019-01-01 PROCEDURE — 99214 PR OFFICE/OUTPT VISIT, EST, LEVL IV, 30-39 MIN: ICD-10-PCS | Mod: S$GLB,,, | Performed by: INTERNAL MEDICINE

## 2019-01-01 PROCEDURE — 25000003 PHARM REV CODE 250: Performed by: SURGERY

## 2019-01-01 PROCEDURE — 80202 ASSAY OF VANCOMYCIN: CPT

## 2019-01-01 PROCEDURE — 82800 BLOOD PH: CPT

## 2019-01-01 PROCEDURE — 99214 OFFICE O/P EST MOD 30 MIN: CPT | Mod: S$GLB,,, | Performed by: INTERNAL MEDICINE

## 2019-01-01 PROCEDURE — 94727 GAS DIL/WSHOT DETER LNG VOL: CPT | Mod: S$GLB,,, | Performed by: INTERNAL MEDICINE

## 2019-01-01 PROCEDURE — 99152 PR MOD CONSCIOUS SEDATION, SAME PHYS, 5+ YRS, FIRST 15 MIN: ICD-10-PCS | Mod: ,,, | Performed by: SURGERY

## 2019-01-01 PROCEDURE — 99223 1ST HOSP IP/OBS HIGH 75: CPT | Mod: AI,GC,, | Performed by: HOSPITALIST

## 2019-01-01 PROCEDURE — 25500020 PHARM REV CODE 255: Performed by: INTERNAL MEDICINE

## 2019-01-01 PROCEDURE — 97803 MED NUTRITION INDIV SUBSEQ: CPT

## 2019-01-01 PROCEDURE — 99999 PR PBB SHADOW E&M-EST. PATIENT-LVL V: ICD-10-PCS | Mod: PBBFAC,,, | Performed by: NURSE PRACTITIONER

## 2019-01-01 PROCEDURE — 85610 PROTHROMBIN TIME: CPT | Mod: 91

## 2019-01-01 PROCEDURE — 99282 EMERGENCY DEPT VISIT SF MDM: CPT

## 2019-01-01 PROCEDURE — 87040 BLOOD CULTURE FOR BACTERIA: CPT | Mod: 59

## 2019-01-01 PROCEDURE — 99215 OFFICE O/P EST HI 40 MIN: CPT | Mod: S$GLB,,, | Performed by: INTERNAL MEDICINE

## 2019-01-01 PROCEDURE — 31720 CLEARANCE OF AIRWAYS: CPT

## 2019-01-01 PROCEDURE — 99291 PR CRITICAL CARE, E/M 30-74 MINUTES: ICD-10-PCS | Mod: ,,, | Performed by: NURSE PRACTITIONER

## 2019-01-01 PROCEDURE — 36620 INSERTION CATHETER ARTERY: CPT | Mod: ,,, | Performed by: INTERNAL MEDICINE

## 2019-01-01 PROCEDURE — 87102 FUNGUS ISOLATION CULTURE: CPT

## 2019-01-01 PROCEDURE — 99212 OFFICE O/P EST SF 10 MIN: CPT | Mod: S$GLB,,, | Performed by: NURSE PRACTITIONER

## 2019-01-01 PROCEDURE — 25500020 PHARM REV CODE 255: Performed by: EMERGENCY MEDICINE

## 2019-01-01 PROCEDURE — C1894 INTRO/SHEATH, NON-LASER: HCPCS | Performed by: SURGERY

## 2019-01-01 PROCEDURE — 93923 UPR/LXTR ART STDY 3+ LVLS: CPT | Mod: S$GLB,,, | Performed by: SURGERY

## 2019-01-01 PROCEDURE — 99999 PR PBB SHADOW E&M-EST. PATIENT-LVL II: ICD-10-PCS | Mod: PBBFAC,,, | Performed by: PSYCHIATRY & NEUROLOGY

## 2019-01-01 PROCEDURE — 82550 ASSAY OF CK (CPK): CPT

## 2019-01-01 PROCEDURE — 84145 PROCALCITONIN (PCT): CPT

## 2019-01-01 PROCEDURE — 99213 PR OFFICE/OUTPT VISIT, EST, LEVL III, 20-29 MIN: ICD-10-PCS | Mod: S$GLB,,, | Performed by: PODIATRIST

## 2019-01-01 PROCEDURE — 82962 GLUCOSE BLOOD TEST: CPT | Mod: 91

## 2019-01-01 PROCEDURE — 99291 PR CRITICAL CARE, E/M 30-74 MINUTES: ICD-10-PCS | Mod: GC,,, | Performed by: INTERNAL MEDICINE

## 2019-01-01 PROCEDURE — 25000003 PHARM REV CODE 250: Performed by: PHYSICIAN ASSISTANT

## 2019-01-01 PROCEDURE — 87116 MYCOBACTERIA CULTURE: CPT

## 2019-01-01 PROCEDURE — 90945 PR DIALYSIS, NOT HEMO, 1 EVAL: ICD-10-PCS | Mod: GC,,, | Performed by: INTERNAL MEDICINE

## 2019-01-01 PROCEDURE — 71000015 HC POSTOP RECOV 1ST HR: Performed by: SURGERY

## 2019-01-01 PROCEDURE — 87106 FUNGI IDENTIFICATION YEAST: CPT

## 2019-01-01 PROCEDURE — 99999 PR PBB SHADOW E&M-EST. PATIENT-LVL V: CPT | Mod: PBBFAC,,, | Performed by: SURGERY

## 2019-01-01 PROCEDURE — 95822 PR EEG,COMA/SLEEP RECORD ONLY: ICD-10-PCS | Mod: 26,,, | Performed by: PSYCHIATRY & NEUROLOGY

## 2019-01-01 PROCEDURE — 99291 PR CRITICAL CARE, E/M 30-74 MINUTES: ICD-10-PCS | Mod: GC,,, | Performed by: EMERGENCY MEDICINE

## 2019-01-01 PROCEDURE — 29580 PR STRAPPING UNNA BOOT: ICD-10-PCS | Mod: RT,S$GLB,, | Performed by: NURSE PRACTITIONER

## 2019-01-01 PROCEDURE — 99233 SBSQ HOSP IP/OBS HIGH 50: CPT | Mod: GC,,, | Performed by: PSYCHIATRY & NEUROLOGY

## 2019-01-01 PROCEDURE — 3008F PR BODY MASS INDEX (BMI) DOCUMENTED: ICD-10-PCS | Mod: CPTII,S$GLB,, | Performed by: PODIATRIST

## 2019-01-01 PROCEDURE — 12000002 HC ACUTE/MED SURGE SEMI-PRIVATE ROOM

## 2019-01-01 PROCEDURE — 99999 PR PBB SHADOW E&M-EST. PATIENT-LVL V: CPT | Mod: PBBFAC,,, | Performed by: NURSE PRACTITIONER

## 2019-01-01 PROCEDURE — 99213 PR OFFICE/OUTPT VISIT, EST, LEVL III, 20-29 MIN: ICD-10-PCS | Mod: S$GLB,,, | Performed by: ORTHOPAEDIC SURGERY

## 2019-01-01 PROCEDURE — 95822 EEG COMA OR SLEEP ONLY: CPT

## 2019-01-01 PROCEDURE — 89051 BODY FLUID CELL COUNT: CPT

## 2019-01-01 PROCEDURE — 25000242 PHARM REV CODE 250 ALT 637 W/ HCPCS: Performed by: STUDENT IN AN ORGANIZED HEALTH CARE EDUCATION/TRAINING PROGRAM

## 2019-01-01 PROCEDURE — 99999 PR PBB SHADOW E&M-EST. PATIENT-LVL III: ICD-10-PCS | Mod: PBBFAC,,, | Performed by: INTERNAL MEDICINE

## 2019-01-01 PROCEDURE — 99223 PR INITIAL HOSPITAL CARE,LEVL III: ICD-10-PCS | Mod: GC,,, | Performed by: INTERNAL MEDICINE

## 2019-01-01 PROCEDURE — 92523 SPEECH SOUND LANG COMPREHEN: CPT

## 2019-01-01 PROCEDURE — 80177 DRUG SCRN QUAN LEVETIRACETAM: CPT

## 2019-01-01 PROCEDURE — 99284 PR EMERGENCY DEPT VISIT,LEVEL IV: ICD-10-PCS | Mod: ,,, | Performed by: EMERGENCY MEDICINE

## 2019-01-01 PROCEDURE — 87040 BLOOD CULTURE FOR BACTERIA: CPT

## 2019-01-01 PROCEDURE — 99233 PR SUBSEQUENT HOSPITAL CARE,LEVL III: ICD-10-PCS | Mod: GC,,, | Performed by: EMERGENCY MEDICINE

## 2019-01-01 PROCEDURE — P9612 CATHETERIZE FOR URINE SPEC: HCPCS

## 2019-01-01 PROCEDURE — 87077 CULTURE AEROBIC IDENTIFY: CPT

## 2019-01-01 PROCEDURE — 99214 OFFICE O/P EST MOD 30 MIN: CPT | Mod: 25,,, | Performed by: NURSE PRACTITIONER

## 2019-01-01 PROCEDURE — 99223 PR INITIAL HOSPITAL CARE,LEVL III: ICD-10-PCS | Mod: GC,,, | Performed by: EMERGENCY MEDICINE

## 2019-01-01 PROCEDURE — 99284 EMERGENCY DEPT VISIT MOD MDM: CPT | Mod: ,,, | Performed by: EMERGENCY MEDICINE

## 2019-01-01 PROCEDURE — 85610 PROTHROMBIN TIME: CPT | Mod: QW,S$GLB,, | Performed by: INTERNAL MEDICINE

## 2019-01-01 PROCEDURE — 99212 PR OFFICE/OUTPT VISIT, EST, LEVL II, 10-19 MIN: ICD-10-PCS | Mod: 25,S$GLB,, | Performed by: NURSE PRACTITIONER

## 2019-01-01 PROCEDURE — 99223 1ST HOSP IP/OBS HIGH 75: CPT | Mod: ,,, | Performed by: PODIATRIST

## 2019-01-01 PROCEDURE — 84443 ASSAY THYROID STIM HORMONE: CPT

## 2019-01-01 PROCEDURE — 36410 VNPNXR 3YR/> PHY/QHP DX/THER: CPT

## 2019-01-01 PROCEDURE — 99225 PR SUBSEQUENT OBSERVATION CARE,LEVEL II: ICD-10-PCS | Mod: ,,, | Performed by: PHYSICIAN ASSISTANT

## 2019-01-01 PROCEDURE — 71000016 HC POSTOP RECOV ADDL HR: Performed by: SURGERY

## 2019-01-01 PROCEDURE — 82784 ASSAY IGA/IGD/IGG/IGM EACH: CPT

## 2019-01-01 PROCEDURE — C9132 KCENTRA, PER I.U.: HCPCS | Mod: JG | Performed by: EMERGENCY MEDICINE

## 2019-01-01 PROCEDURE — 99214 PR OFFICE/OUTPT VISIT, EST, LEVL IV, 30-39 MIN: ICD-10-PCS | Mod: S$GLB,,, | Performed by: NURSE PRACTITIONER

## 2019-01-01 PROCEDURE — 86706 HEP B SURFACE ANTIBODY: CPT

## 2019-01-01 PROCEDURE — 82040 ASSAY OF SERUM ALBUMIN: CPT

## 2019-01-01 PROCEDURE — 31624 DX BRONCHOSCOPE/LAVAGE: CPT | Mod: RT,,, | Performed by: INTERNAL MEDICINE

## 2019-01-01 PROCEDURE — 93296 REM INTERROG EVL PM/IDS: CPT

## 2019-01-01 PROCEDURE — 73130 X-RAY EXAM OF HAND: CPT | Mod: 26,LT,, | Performed by: RADIOLOGY

## 2019-01-01 PROCEDURE — 99212 OFFICE O/P EST SF 10 MIN: CPT | Mod: 25,S$GLB,, | Performed by: NURSE PRACTITIONER

## 2019-01-01 PROCEDURE — 99222 1ST HOSP IP/OBS MODERATE 55: CPT | Mod: GC,,, | Performed by: INTERNAL MEDICINE

## 2019-01-01 PROCEDURE — 97161 PT EVAL LOW COMPLEX 20 MIN: CPT

## 2019-01-01 PROCEDURE — 3008F PR BODY MASS INDEX (BMI) DOCUMENTED: ICD-10-PCS | Mod: CPTII,S$GLB,, | Performed by: NURSE PRACTITIONER

## 2019-01-01 PROCEDURE — 90945 PR DIALYSIS, NOT HEMO, 1 EVAL: ICD-10-PCS | Mod: ,,, | Performed by: NURSE PRACTITIONER

## 2019-01-01 PROCEDURE — 3044F PR MOST RECENT HEMOGLOBIN A1C LEVEL <7.0%: ICD-10-PCS | Mod: CPTII,S$GLB,, | Performed by: INTERNAL MEDICINE

## 2019-01-01 PROCEDURE — 99213 OFFICE O/P EST LOW 20 MIN: CPT | Mod: 25,S$GLB,, | Performed by: PODIATRIST

## 2019-01-01 PROCEDURE — 99285 PR EMERGENCY DEPT VISIT,LEVEL V: ICD-10-PCS | Mod: ,,, | Performed by: PHYSICIAN ASSISTANT

## 2019-01-01 PROCEDURE — 84132 ASSAY OF SERUM POTASSIUM: CPT

## 2019-01-01 PROCEDURE — 99213 OFFICE O/P EST LOW 20 MIN: CPT | Mod: S$GLB,,, | Performed by: ORTHOPAEDIC SURGERY

## 2019-01-01 PROCEDURE — 99999 PR PBB SHADOW E&M-EST. PATIENT-LVL IV: CPT | Mod: PBBFAC,,, | Performed by: INTERNAL MEDICINE

## 2019-01-01 PROCEDURE — 99213 OFFICE O/P EST LOW 20 MIN: CPT | Mod: S$GLB,,, | Performed by: PODIATRIST

## 2019-01-01 PROCEDURE — 3044F HG A1C LEVEL LT 7.0%: CPT | Mod: CPTII,S$GLB,, | Performed by: INTERNAL MEDICINE

## 2019-01-01 PROCEDURE — 87632 RESP VIRUS 6-11 TARGETS: CPT

## 2019-01-01 PROCEDURE — 94727 PR PULM FUNCTION TEST BY GAS: ICD-10-PCS | Mod: S$GLB,,, | Performed by: INTERNAL MEDICINE

## 2019-01-01 PROCEDURE — 11721 DEBRIDE NAIL 6 OR MORE: CPT | Mod: 59,Q7,S$GLB, | Performed by: PODIATRIST

## 2019-01-01 PROCEDURE — 36800 INSERTION OF CANNULA: CPT | Mod: ,,, | Performed by: EMERGENCY MEDICINE

## 2019-01-01 PROCEDURE — 73130 X-RAY EXAM OF HAND: CPT | Mod: 50,TC,FY

## 2019-01-01 PROCEDURE — 85610 PROTHROMBIN TIME: CPT | Mod: QW,S$GLB,, | Performed by: PHARMACIST

## 2019-01-01 PROCEDURE — 99239 HOSP IP/OBS DSCHRG MGMT >30: CPT | Mod: ,,, | Performed by: INTERNAL MEDICINE

## 2019-01-01 PROCEDURE — 94799 UNLISTED PULMONARY SVC/PX: CPT

## 2019-01-01 PROCEDURE — 36620 PR INSERT CATH,ART,PERCUT,SHORTTERM: ICD-10-PCS | Mod: ,,, | Performed by: INTERNAL MEDICINE

## 2019-01-01 PROCEDURE — 99999 PR PBB SHADOW E&M-EST. PATIENT-LVL III: ICD-10-PCS | Mod: PBBFAC,,, | Performed by: PSYCHIATRY & NEUROLOGY

## 2019-01-01 PROCEDURE — 99233 PR SUBSEQUENT HOSPITAL CARE,LEVL III: ICD-10-PCS | Mod: GC,,, | Performed by: PSYCHIATRY & NEUROLOGY

## 2019-01-01 PROCEDURE — 99214 OFFICE O/P EST MOD 30 MIN: CPT | Mod: S$GLB,,, | Performed by: NURSE PRACTITIONER

## 2019-01-01 PROCEDURE — 29580 STRAPPING UNNA BOOT: CPT | Mod: RT,S$GLB,, | Performed by: SURGERY

## 2019-01-01 PROCEDURE — C1751 CATH, INF, PER/CENT/MIDLINE: HCPCS

## 2019-01-01 PROCEDURE — 99999 PR PBB SHADOW E&M-EST. PATIENT-LVL III: ICD-10-PCS | Mod: PBBFAC,,, | Performed by: SURGERY

## 2019-01-01 PROCEDURE — 99999 PR PBB SHADOW E&M-EST. PATIENT-LVL II: ICD-10-PCS | Mod: PBBFAC,,, | Performed by: ORTHOPAEDIC SURGERY

## 2019-01-01 PROCEDURE — 99152 MOD SED SAME PHYS/QHP 5/>YRS: CPT | Performed by: SURGERY

## 2019-01-01 PROCEDURE — 99499 RISK ADDL DX/OHS AUDIT: ICD-10-PCS | Mod: S$GLB,,, | Performed by: NURSE PRACTITIONER

## 2019-01-01 PROCEDURE — 3008F PR BODY MASS INDEX (BMI) DOCUMENTED: ICD-10-PCS | Mod: CPTII,S$GLB,, | Performed by: PSYCHIATRY & NEUROLOGY

## 2019-01-01 PROCEDURE — 99999 PR PBB SHADOW E&M-EST. PATIENT-LVL II: CPT | Mod: PBBFAC,,, | Performed by: PSYCHIATRY & NEUROLOGY

## 2019-01-01 PROCEDURE — D9220A PRA ANESTHESIA: ICD-10-PCS | Mod: CRNA,,, | Performed by: NURSE ANESTHETIST, CERTIFIED REGISTERED

## 2019-01-01 PROCEDURE — 84478 ASSAY OF TRIGLYCERIDES: CPT

## 2019-01-01 PROCEDURE — 87102 FUNGUS ISOLATION CULTURE: CPT | Mod: 59

## 2019-01-01 PROCEDURE — 27100171 HC OXYGEN HIGH FLOW UP TO 24 HOURS

## 2019-01-01 PROCEDURE — 87798 DETECT AGENT NOS DNA AMP: CPT

## 2019-01-01 PROCEDURE — 99211 PR OFFICE/OUTPT VISIT, EST, LEVL I: ICD-10-PCS | Mod: 25,S$GLB,, | Performed by: PHARMACIST

## 2019-01-01 PROCEDURE — 87077 CULTURE AEROBIC IDENTIFY: CPT | Mod: 59

## 2019-01-01 PROCEDURE — 99232 PR SUBSEQUENT HOSPITAL CARE,LEVL II: ICD-10-PCS | Mod: GC,,, | Performed by: INTERNAL MEDICINE

## 2019-01-01 PROCEDURE — 99233 SBSQ HOSP IP/OBS HIGH 50: CPT | Mod: GC,,, | Performed by: HOSPITALIST

## 2019-01-01 PROCEDURE — 93923 PR NON-INVASIVE PHYSIOLOGIC STUDY EXTREMITY 3 LEVELS: ICD-10-PCS | Mod: S$GLB,,, | Performed by: SURGERY

## 2019-01-01 PROCEDURE — 63600175 PHARM REV CODE 636 W HCPCS

## 2019-01-01 PROCEDURE — 99999 PR PBB SHADOW E&M-EST. PATIENT-LVL V: ICD-10-PCS | Mod: PBBFAC,,, | Performed by: SURGERY

## 2019-01-01 PROCEDURE — 99211 OFF/OP EST MAY X REQ PHY/QHP: CPT | Mod: 25,S$GLB,, | Performed by: PHARMACIST

## 2019-01-01 PROCEDURE — 36556 PR INSERT NON-TUNNEL CV CATH 5+ YRS OLD: ICD-10-PCS | Mod: ,,, | Performed by: NURSE PRACTITIONER

## 2019-01-01 PROCEDURE — 99153 MOD SED SAME PHYS/QHP EA: CPT | Performed by: SURGERY

## 2019-01-01 PROCEDURE — 99999 PR PBB SHADOW E&M-EST. PATIENT-LVL III: CPT | Mod: PBBFAC,,, | Performed by: PSYCHIATRY & NEUROLOGY

## 2019-01-01 PROCEDURE — 82607 VITAMIN B-12: CPT

## 2019-01-01 PROCEDURE — 94729 DIFFUSING CAPACITY: CPT | Mod: S$GLB,,, | Performed by: INTERNAL MEDICINE

## 2019-01-01 PROCEDURE — 87206 SMEAR FLUORESCENT/ACID STAI: CPT

## 2019-01-01 PROCEDURE — 99999 PR PBB SHADOW E&M-EST. PATIENT-LVL V: CPT | Mod: PBBFAC,,, | Performed by: PODIATRIST

## 2019-01-01 PROCEDURE — 99239 PR HOSPITAL DISCHARGE DAY,>30 MIN: ICD-10-PCS | Mod: ,,, | Performed by: NURSE PRACTITIONER

## 2019-01-01 PROCEDURE — 99233 PR SUBSEQUENT HOSPITAL CARE,LEVL III: ICD-10-PCS | Mod: ,,, | Performed by: NEUROLOGICAL SURGERY

## 2019-01-01 PROCEDURE — 99214 PR OFFICE/OUTPT VISIT, EST, LEVL IV, 30-39 MIN: ICD-10-PCS | Mod: S$GLB,,, | Performed by: PSYCHIATRY & NEUROLOGY

## 2019-01-01 PROCEDURE — 87502 INFLUENZA DNA AMP PROBE: CPT

## 2019-01-01 PROCEDURE — 99999 PR PBB SHADOW E&M-EST. PATIENT-LVL III: CPT | Mod: PBBFAC,,, | Performed by: INTERNAL MEDICINE

## 2019-01-01 PROCEDURE — 27100092 HC HIGH FLOW DELIVERY CANNULA

## 2019-01-01 PROCEDURE — 3044F PR MOST RECENT HEMOGLOBIN A1C LEVEL <7.0%: ICD-10-PCS | Mod: CPTII,S$GLB,, | Performed by: PODIATRIST

## 2019-01-01 PROCEDURE — 93990 DOPPLER FLOW TESTING: CPT | Mod: 59,S$GLB,, | Performed by: SURGERY

## 2019-01-01 PROCEDURE — 36800 PR INSERT CANNULA,VEIN-VEIN: ICD-10-PCS | Mod: ,,, | Performed by: EMERGENCY MEDICINE

## 2019-01-01 PROCEDURE — 99233 SBSQ HOSP IP/OBS HIGH 50: CPT | Mod: ,,, | Performed by: NEUROLOGICAL SURGERY

## 2019-01-01 PROCEDURE — 99232 SBSQ HOSP IP/OBS MODERATE 35: CPT | Mod: GC,,, | Performed by: INTERNAL MEDICINE

## 2019-01-01 PROCEDURE — 83540 ASSAY OF IRON: CPT

## 2019-01-01 PROCEDURE — 87186 SC STD MICRODIL/AGAR DIL: CPT | Mod: 59

## 2019-01-01 PROCEDURE — 99213 PR OFFICE/OUTPT VISIT, EST, LEVL III, 20-29 MIN: ICD-10-PCS | Mod: 25,S$GLB,, | Performed by: PODIATRIST

## 2019-01-01 PROCEDURE — 99999 PR PBB SHADOW E&M-EST. PATIENT-LVL V: ICD-10-PCS | Mod: PBBFAC,,, | Performed by: PODIATRIST

## 2019-01-01 PROCEDURE — 99214 PR OFFICE/OUTPT VISIT, EST, LEVL IV, 30-39 MIN: ICD-10-PCS | Mod: 25,,, | Performed by: NURSE PRACTITIONER

## 2019-01-01 PROCEDURE — 99285 EMERGENCY DEPT VISIT HI MDM: CPT | Mod: ,,, | Performed by: PHYSICIAN ASSISTANT

## 2019-01-01 PROCEDURE — C1887 CATHETER, GUIDING: HCPCS | Performed by: SURGERY

## 2019-01-01 PROCEDURE — 99239 HOSP IP/OBS DSCHRG MGMT >30: CPT | Mod: GC,,, | Performed by: HOSPITALIST

## 2019-01-01 PROCEDURE — 63600175 PHARM REV CODE 636 W HCPCS: Mod: JG | Performed by: GENERAL PRACTICE

## 2019-01-01 PROCEDURE — 99220 PR INITIAL OBSERVATION CARE,LEVL III: CPT | Mod: ,,, | Performed by: INTERNAL MEDICINE

## 2019-01-01 PROCEDURE — 87015 SPECIMEN INFECT AGNT CONCNTJ: CPT

## 2019-01-01 PROCEDURE — 94010 BREATHING CAPACITY TEST: ICD-10-PCS | Mod: S$GLB,,, | Performed by: INTERNAL MEDICINE

## 2019-01-01 PROCEDURE — 73130 PR  X-RAY HAND 3+ VW: ICD-10-PCS | Mod: 26,LT,, | Performed by: RADIOLOGY

## 2019-01-01 PROCEDURE — 87449 NOS EACH ORGANISM AG IA: CPT | Mod: 91

## 2019-01-01 PROCEDURE — 87186 SC STD MICRODIL/AGAR DIL: CPT

## 2019-01-01 PROCEDURE — 37000009 HC ANESTHESIA EA ADD 15 MINS: Performed by: SURGERY

## 2019-01-01 PROCEDURE — 31500 PR INSERT, EMERGENCY ENDOTRACH AIRWAY: ICD-10-PCS | Mod: ,,, | Performed by: INTERNAL MEDICINE

## 2019-01-01 PROCEDURE — 3008F BODY MASS INDEX DOCD: CPT | Mod: CPTII,S$GLB,, | Performed by: PODIATRIST

## 2019-01-01 PROCEDURE — 99233 PR SUBSEQUENT HOSPITAL CARE,LEVL III: ICD-10-PCS | Mod: ,,, | Performed by: HOSPITALIST

## 2019-01-01 PROCEDURE — 99213 PR OFFICE/OUTPT VISIT, EST, LEVL III, 20-29 MIN: ICD-10-PCS | Mod: S$GLB,,, | Performed by: NURSE PRACTITIONER

## 2019-01-01 PROCEDURE — 31624 DX BRONCHOSCOPE/LAVAGE: CPT

## 2019-01-01 PROCEDURE — 99214 PR OFFICE/OUTPT VISIT, EST, LEVL IV, 30-39 MIN: ICD-10-PCS | Mod: 25,S$GLB,, | Performed by: SURGERY

## 2019-01-01 PROCEDURE — 99214 OFFICE O/P EST MOD 30 MIN: CPT | Mod: 25,S$GLB,, | Performed by: SURGERY

## 2019-01-01 PROCEDURE — 96366 THER/PROPH/DIAG IV INF ADDON: CPT

## 2019-01-01 PROCEDURE — 99211 OFF/OP EST MAY X REQ PHY/QHP: CPT | Mod: 25,S$GLB,, | Performed by: INTERNAL MEDICINE

## 2019-01-01 PROCEDURE — 63600175 PHARM REV CODE 636 W HCPCS: Mod: JG | Performed by: NURSE PRACTITIONER

## 2019-01-01 PROCEDURE — 99239 HOSP IP/OBS DSCHRG MGMT >30: CPT | Mod: ,,, | Performed by: HOSPITALIST

## 2019-01-01 PROCEDURE — 99999 PR PBB SHADOW E&M-EST. PATIENT-LVL III: ICD-10-PCS | Mod: PBBFAC,,, | Performed by: PODIATRIST

## 2019-01-01 PROCEDURE — 94729 PR C02/MEMBANE DIFFUSE CAPACITY: ICD-10-PCS | Mod: S$GLB,,, | Performed by: INTERNAL MEDICINE

## 2019-01-01 PROCEDURE — 99999 PR PBB SHADOW E&M-EST. PATIENT-LVL IV: ICD-10-PCS | Mod: PBBFAC,,, | Performed by: NURSE PRACTITIONER

## 2019-01-01 PROCEDURE — 85027 COMPLETE CBC AUTOMATED: CPT | Mod: 91

## 2019-01-01 PROCEDURE — 25000242 PHARM REV CODE 250 ALT 637 W/ HCPCS: Performed by: PHYSICIAN ASSISTANT

## 2019-01-01 PROCEDURE — 31624 PR BRONCHOSCOPY,DIAG2STIC W LAVAGE: ICD-10-PCS | Mod: RT,,, | Performed by: INTERNAL MEDICINE

## 2019-01-01 PROCEDURE — 82962 GLUCOSE BLOOD TEST: CPT | Mod: 91 | Performed by: SURGERY

## 2019-01-01 PROCEDURE — 93010 EKG 12-LEAD: ICD-10-PCS | Mod: 76,,, | Performed by: INTERNAL MEDICINE

## 2019-01-01 PROCEDURE — 99499 RISK ADDL DX/OHS AUDIT: ICD-10-PCS | Mod: S$GLB,,, | Performed by: INTERNAL MEDICINE

## 2019-01-01 PROCEDURE — 86256 FLUORESCENT ANTIBODY TITER: CPT | Mod: 91

## 2019-01-01 PROCEDURE — 99282 EMERGENCY DEPT VISIT SF MDM: CPT | Mod: ,,, | Performed by: EMERGENCY MEDICINE

## 2019-01-01 PROCEDURE — 99499 UNLISTED E&M SERVICE: CPT | Mod: S$GLB,,, | Performed by: ORTHOPAEDIC SURGERY

## 2019-01-01 PROCEDURE — 99291 CRITICAL CARE FIRST HOUR: CPT | Mod: 25

## 2019-01-01 PROCEDURE — 99999 PR PBB SHADOW E&M-EST. PATIENT-LVL III: CPT | Mod: PBBFAC,,, | Performed by: SURGERY

## 2019-01-01 PROCEDURE — D9220A PRA ANESTHESIA: ICD-10-PCS | Mod: ANES,,, | Performed by: ANESTHESIOLOGY

## 2019-01-01 PROCEDURE — 99239 HOSP IP/OBS DSCHRG MGMT >30: CPT | Mod: ,,, | Performed by: NURSE PRACTITIONER

## 2019-01-01 PROCEDURE — 82746 ASSAY OF FOLIC ACID SERUM: CPT

## 2019-01-01 PROCEDURE — 99499 RISK ADDL DX/OHS AUDIT: ICD-10-PCS | Mod: S$GLB,,, | Performed by: ORTHOPAEDIC SURGERY

## 2019-01-01 PROCEDURE — 99233 PR SUBSEQUENT HOSPITAL CARE,LEVL III: ICD-10-PCS | Mod: GC,,, | Performed by: HOSPITALIST

## 2019-01-01 PROCEDURE — 99211 PR OFFICE/OUTPT VISIT, EST, LEVL I: ICD-10-PCS | Mod: 25,S$GLB,,

## 2019-01-01 PROCEDURE — 99220 PR INITIAL OBSERVATION CARE,LEVL III: ICD-10-PCS | Mod: ,,, | Performed by: INTERNAL MEDICINE

## 2019-01-01 PROCEDURE — 99214 OFFICE O/P EST MOD 30 MIN: CPT | Mod: S$GLB,,, | Performed by: PSYCHIATRY & NEUROLOGY

## 2019-01-01 PROCEDURE — 99499 UNLISTED E&M SERVICE: CPT | Mod: S$GLB,,, | Performed by: INTERNAL MEDICINE

## 2019-01-01 PROCEDURE — 3044F PR MOST RECENT HEMOGLOBIN A1C LEVEL <7.0%: ICD-10-PCS | Mod: CPTII,S$GLB,, | Performed by: NURSE PRACTITIONER

## 2019-01-01 PROCEDURE — 93990 PR DUPLEX HEMODIALYSIS ACCESS: ICD-10-PCS | Mod: 59,S$GLB,, | Performed by: SURGERY

## 2019-01-01 PROCEDURE — 76937 US GUIDE VASCULAR ACCESS: CPT

## 2019-01-01 PROCEDURE — 3008F BODY MASS INDEX DOCD: CPT | Mod: CPTII,S$GLB,, | Performed by: ORTHOPAEDIC SURGERY

## 2019-01-01 PROCEDURE — 99291 CRITICAL CARE FIRST HOUR: CPT | Mod: GC,,, | Performed by: EMERGENCY MEDICINE

## 2019-01-01 PROCEDURE — 63600175 PHARM REV CODE 636 W HCPCS: Mod: JG | Performed by: STUDENT IN AN ORGANIZED HEALTH CARE EDUCATION/TRAINING PROGRAM

## 2019-01-01 PROCEDURE — 99900025 HC BRONCHOSCOPY-ASST (STAT)

## 2019-01-01 PROCEDURE — 29580 PR STRAPPING UNNA BOOT: ICD-10-PCS | Mod: RT,S$GLB,, | Performed by: SURGERY

## 2019-01-01 PROCEDURE — 99223 1ST HOSP IP/OBS HIGH 75: CPT | Mod: GC,,, | Performed by: EMERGENCY MEDICINE

## 2019-01-01 PROCEDURE — 86038 ANTINUCLEAR ANTIBODIES: CPT

## 2019-01-01 PROCEDURE — G0257 UNSCHED DIALYSIS ESRD PT HOS: HCPCS

## 2019-01-01 PROCEDURE — 3044F HG A1C LEVEL LT 7.0%: CPT | Mod: CPTII,S$GLB,, | Performed by: PODIATRIST

## 2019-01-01 PROCEDURE — 99282 PR EMERGENCY DEPT VISIT,LEVEL II: ICD-10-PCS | Mod: ,,, | Performed by: EMERGENCY MEDICINE

## 2019-01-01 PROCEDURE — 99239 PR HOSPITAL DISCHARGE DAY,>30 MIN: ICD-10-PCS | Mod: ,,, | Performed by: INTERNAL MEDICINE

## 2019-01-01 PROCEDURE — 99284 EMERGENCY DEPT VISIT MOD MDM: CPT | Mod: 25

## 2019-01-01 PROCEDURE — 37000008 HC ANESTHESIA 1ST 15 MINUTES: Performed by: SURGERY

## 2019-01-01 PROCEDURE — 85347 COAGULATION TIME ACTIVATED: CPT

## 2019-01-01 PROCEDURE — 36620 INSERTION CATHETER ARTERY: CPT | Mod: ,,, | Performed by: NURSE PRACTITIONER

## 2019-01-01 PROCEDURE — 36620 PR INSERT CATH,ART,PERCUT,SHORTTERM: ICD-10-PCS | Mod: ,,, | Performed by: NURSE PRACTITIONER

## 2019-01-01 PROCEDURE — 36000707: Performed by: SURGERY

## 2019-01-01 DEVICE — VIABAHN SX ENDO HEPARIN 35 RO 8MMX5CM 8FR 75CMCATH
Type: IMPLANTABLE DEVICE | Site: ARM | Status: FUNCTIONAL
Brand: GORE VIABAHN ENDOPROSTHESIS WITH HEPARIN

## 2019-01-01 RX ORDER — LIDOCAINE HYDROCHLORIDE 10 MG/ML
1 INJECTION INFILTRATION; PERINEURAL ONCE
Status: COMPLETED | OUTPATIENT
Start: 2019-01-01 | End: 2019-01-01

## 2019-01-01 RX ORDER — GLUCAGON 1 MG
1 KIT INJECTION
Status: DISCONTINUED | OUTPATIENT
Start: 2019-01-01 | End: 2019-01-01 | Stop reason: HOSPADM

## 2019-01-01 RX ORDER — ROCURONIUM BROMIDE 10 MG/ML
100 INJECTION, SOLUTION INTRAVENOUS ONCE
Status: COMPLETED | OUTPATIENT
Start: 2019-01-01 | End: 2019-01-01

## 2019-01-01 RX ORDER — HYDROCODONE BITARTRATE AND ACETAMINOPHEN 500; 5 MG/1; MG/1
TABLET ORAL CONTINUOUS
Status: DISCONTINUED | OUTPATIENT
Start: 2019-01-01 | End: 2019-01-01

## 2019-01-01 RX ORDER — MIDODRINE HYDROCHLORIDE 5 MG/1
5 TABLET ORAL 3 TIMES DAILY
Status: DISCONTINUED | OUTPATIENT
Start: 2019-01-01 | End: 2019-01-01

## 2019-01-01 RX ORDER — NORTRIPTYLINE HYDROCHLORIDE 25 MG/1
25 CAPSULE ORAL
Status: DISCONTINUED | OUTPATIENT
Start: 2019-01-01 | End: 2019-01-01 | Stop reason: HOSPADM

## 2019-01-01 RX ORDER — POTASSIUM CHLORIDE 7.45 MG/ML
10 INJECTION INTRAVENOUS ONCE
Status: COMPLETED | OUTPATIENT
Start: 2019-01-01 | End: 2019-01-01

## 2019-01-01 RX ORDER — LEVETIRACETAM 500 MG/1
TABLET ORAL
Qty: 180 TABLET | Refills: 3 | Status: ON HOLD | OUTPATIENT
Start: 2019-01-01 | End: 2019-01-01 | Stop reason: HOSPADM

## 2019-01-01 RX ORDER — FLUDROCORTISONE ACETATE 0.1 MG/1
200 TABLET ORAL 2 TIMES DAILY
Status: DISCONTINUED | OUTPATIENT
Start: 2019-01-01 | End: 2019-01-01 | Stop reason: HOSPADM

## 2019-01-01 RX ORDER — WARFARIN 7.5 MG/1
7.5 TABLET ORAL ONCE
Status: COMPLETED | OUTPATIENT
Start: 2019-01-01 | End: 2019-01-01

## 2019-01-01 RX ORDER — SEVELAMER CARBONATE 800 MG/1
1600 TABLET, FILM COATED ORAL
Status: DISCONTINUED | OUTPATIENT
Start: 2019-01-01 | End: 2019-01-01

## 2019-01-01 RX ORDER — FENTANYL CITRATE-0.9 % NACL/PF 10 MCG/ML
PLASTIC BAG, INJECTION (ML) INTRAVENOUS CONTINUOUS
Status: DISCONTINUED | OUTPATIENT
Start: 2019-01-01 | End: 2019-01-01

## 2019-01-01 RX ORDER — LINAGLIPTIN 5 MG/1
TABLET, FILM COATED ORAL
Qty: 90 TABLET | Refills: 0 | Status: SHIPPED | OUTPATIENT
Start: 2019-01-01 | End: 2019-01-01 | Stop reason: SDUPTHER

## 2019-01-01 RX ORDER — CLOPIDOGREL BISULFATE 75 MG/1
75 TABLET ORAL DAILY
Status: DISCONTINUED | OUTPATIENT
Start: 2019-01-01 | End: 2019-01-01 | Stop reason: HOSPADM

## 2019-01-01 RX ORDER — MIDODRINE HYDROCHLORIDE 5 MG/1
10 TABLET ORAL 3 TIMES DAILY
Status: DISCONTINUED | OUTPATIENT
Start: 2019-01-01 | End: 2019-01-01

## 2019-01-01 RX ORDER — VANCOMYCIN 2 GRAM/500 ML IN 0.9 % SODIUM CHLORIDE INTRAVENOUS
2000
Status: COMPLETED | OUTPATIENT
Start: 2019-01-01 | End: 2019-01-01

## 2019-01-01 RX ORDER — AMOXICILLIN 250 MG
1 CAPSULE ORAL ONCE
Status: COMPLETED | OUTPATIENT
Start: 2019-01-01 | End: 2019-01-01

## 2019-01-01 RX ORDER — LEVOFLOXACIN 500 MG/1
500 TABLET, FILM COATED ORAL
Status: DISCONTINUED | OUTPATIENT
Start: 2019-01-01 | End: 2019-01-01 | Stop reason: HOSPADM

## 2019-01-01 RX ORDER — INSULIN ASPART 100 [IU]/ML
0-5 INJECTION, SOLUTION INTRAVENOUS; SUBCUTANEOUS
Status: CANCELLED | OUTPATIENT
Start: 2019-01-01

## 2019-01-01 RX ORDER — DICYCLOMINE HYDROCHLORIDE 20 MG/1
20 TABLET ORAL EVERY 8 HOURS PRN
Status: CANCELLED | OUTPATIENT
Start: 2019-01-01

## 2019-01-01 RX ORDER — MAGNESIUM SULFATE HEPTAHYDRATE 40 MG/ML
2 INJECTION, SOLUTION INTRAVENOUS
Status: DISCONTINUED | OUTPATIENT
Start: 2019-01-01 | End: 2019-01-01

## 2019-01-01 RX ORDER — LORAZEPAM 0.5 MG/1
0.5 TABLET ORAL
Status: COMPLETED | OUTPATIENT
Start: 2019-01-01 | End: 2019-01-01

## 2019-01-01 RX ORDER — SEVELAMER CARBONATE 800 MG/1
800 TABLET, FILM COATED ORAL
Status: DISCONTINUED | OUTPATIENT
Start: 2019-01-01 | End: 2019-01-01 | Stop reason: HOSPADM

## 2019-01-01 RX ORDER — CINACALCET 30 MG/1
30 TABLET, FILM COATED ORAL
Status: DISCONTINUED | OUTPATIENT
Start: 2019-01-01 | End: 2019-01-01

## 2019-01-01 RX ORDER — PREDNISONE 10 MG/1
10 TABLET ORAL ONCE
Status: COMPLETED | OUTPATIENT
Start: 2019-01-01 | End: 2019-01-01

## 2019-01-01 RX ORDER — DOXYCYCLINE HYCLATE 100 MG
100 TABLET ORAL EVERY 12 HOURS
Status: DISCONTINUED | OUTPATIENT
Start: 2019-01-01 | End: 2019-01-01 | Stop reason: HOSPADM

## 2019-01-01 RX ORDER — IBUPROFEN 200 MG
16 TABLET ORAL
Status: DISCONTINUED | OUTPATIENT
Start: 2019-01-01 | End: 2019-01-01 | Stop reason: HOSPADM

## 2019-01-01 RX ORDER — FAMOTIDINE 20 MG/1
20 TABLET, FILM COATED ORAL DAILY
Status: DISCONTINUED | OUTPATIENT
Start: 2019-01-01 | End: 2019-01-01

## 2019-01-01 RX ORDER — NORTRIPTYLINE HYDROCHLORIDE 25 MG/1
25 CAPSULE ORAL NIGHTLY
Status: DISCONTINUED | OUTPATIENT
Start: 2019-01-01 | End: 2019-01-01 | Stop reason: HOSPADM

## 2019-01-01 RX ORDER — POLYETHYLENE GLYCOL 3350 17 G/17G
17 POWDER, FOR SOLUTION ORAL 2 TIMES DAILY
Status: DISCONTINUED | OUTPATIENT
Start: 2019-01-01 | End: 2019-01-01

## 2019-01-01 RX ORDER — GLUCAGON 1 MG
1 KIT INJECTION
Status: DISCONTINUED | OUTPATIENT
Start: 2019-01-01 | End: 2019-01-01

## 2019-01-01 RX ORDER — DOXYCYCLINE HYCLATE 100 MG
100 TABLET ORAL EVERY 12 HOURS
Qty: 12 TABLET | Refills: 0 | Status: SHIPPED | OUTPATIENT
Start: 2019-01-01 | End: 2019-01-01

## 2019-01-01 RX ORDER — ASPIRIN 81 MG/1
81 TABLET ORAL DAILY
Status: CANCELLED | OUTPATIENT
Start: 2019-01-01

## 2019-01-01 RX ORDER — HYDROCODONE BITARTRATE AND ACETAMINOPHEN 500; 5 MG/1; MG/1
TABLET ORAL CONTINUOUS
Status: ACTIVE | OUTPATIENT
Start: 2019-01-01 | End: 2019-01-01

## 2019-01-01 RX ORDER — LIDOCAINE HYDROCHLORIDE 10 MG/ML
1 INJECTION, SOLUTION EPIDURAL; INFILTRATION; INTRACAUDAL; PERINEURAL ONCE
Status: DISCONTINUED | OUTPATIENT
Start: 2019-01-01 | End: 2019-01-01 | Stop reason: HOSPADM

## 2019-01-01 RX ORDER — MIDODRINE HYDROCHLORIDE 5 MG/1
10 TABLET ORAL ONCE
Status: DISCONTINUED | OUTPATIENT
Start: 2019-01-01 | End: 2019-01-01

## 2019-01-01 RX ORDER — IBUPROFEN 200 MG
24 TABLET ORAL
Status: DISCONTINUED | OUTPATIENT
Start: 2019-01-01 | End: 2019-01-01 | Stop reason: HOSPADM

## 2019-01-01 RX ORDER — ACETAMINOPHEN 325 MG/1
650 TABLET ORAL EVERY 4 HOURS PRN
Status: DISCONTINUED | OUTPATIENT
Start: 2019-01-01 | End: 2019-01-01 | Stop reason: HOSPADM

## 2019-01-01 RX ORDER — ETOMIDATE 2 MG/ML
0.3 INJECTION INTRAVENOUS ONCE
Status: COMPLETED | OUTPATIENT
Start: 2019-01-01 | End: 2019-01-01

## 2019-01-01 RX ORDER — TIZANIDINE 4 MG/1
TABLET ORAL
Qty: 90 TABLET | Refills: 0 | Status: SHIPPED | OUTPATIENT
Start: 2019-01-01 | End: 2019-01-01 | Stop reason: SDUPTHER

## 2019-01-01 RX ORDER — SODIUM CHLORIDE 9 MG/ML
INJECTION, SOLUTION INTRAVENOUS ONCE
Status: COMPLETED | OUTPATIENT
Start: 2019-01-01 | End: 2019-01-01

## 2019-01-01 RX ORDER — FENTANYL CITRATE 50 UG/ML
50 INJECTION, SOLUTION INTRAMUSCULAR; INTRAVENOUS
Status: DISCONTINUED | OUTPATIENT
Start: 2019-01-01 | End: 2019-01-01 | Stop reason: HOSPADM

## 2019-01-01 RX ORDER — PREDNISONE 10 MG/1
10 TABLET ORAL DAILY
Status: DISCONTINUED | OUTPATIENT
Start: 2019-01-01 | End: 2019-01-01

## 2019-01-01 RX ORDER — AMMONIUM LACTATE 12 G/100G
LOTION TOPICAL 2 TIMES DAILY PRN
Status: DISCONTINUED | OUTPATIENT
Start: 2019-01-01 | End: 2019-01-01 | Stop reason: HOSPADM

## 2019-01-01 RX ORDER — HEPARIN SODIUM 200 [USP'U]/100ML
INJECTION, SOLUTION INTRAVENOUS CONTINUOUS PRN
Status: DISCONTINUED | OUTPATIENT
Start: 2019-01-01 | End: 2019-01-01 | Stop reason: HOSPADM

## 2019-01-01 RX ORDER — SODIUM CHLORIDE 9 MG/ML
INJECTION, SOLUTION INTRAVENOUS
Status: DISCONTINUED | OUTPATIENT
Start: 2019-01-01 | End: 2019-01-01

## 2019-01-01 RX ORDER — RAMELTEON 8 MG/1
8 TABLET ORAL NIGHTLY PRN
Status: CANCELLED | OUTPATIENT
Start: 2019-01-01

## 2019-01-01 RX ORDER — ACETAMINOPHEN 500 MG
1000 TABLET ORAL
Status: ACTIVE | OUTPATIENT
Start: 2019-01-01 | End: 2019-01-01

## 2019-01-01 RX ORDER — SODIUM CHLORIDE 0.9 % (FLUSH) 0.9 %
10 SYRINGE (ML) INJECTION
Status: CANCELLED | OUTPATIENT
Start: 2019-01-01

## 2019-01-01 RX ORDER — KETOROLAC TROMETHAMINE 5 MG/ML
1 SOLUTION OPHTHALMIC 2 TIMES DAILY
Status: DISCONTINUED | OUTPATIENT
Start: 2019-01-01 | End: 2019-01-01 | Stop reason: HOSPADM

## 2019-01-01 RX ORDER — PREDNISONE 20 MG/1
20 TABLET ORAL DAILY
Status: DISCONTINUED | OUTPATIENT
Start: 2019-01-01 | End: 2019-01-01 | Stop reason: HOSPADM

## 2019-01-01 RX ORDER — DICYCLOMINE HYDROCHLORIDE 10 MG/1
10 CAPSULE ORAL
Status: COMPLETED | OUTPATIENT
Start: 2019-01-01 | End: 2019-01-01

## 2019-01-01 RX ORDER — LIDOCAINE HYDROCHLORIDE 10 MG/ML
1 INJECTION, SOLUTION EPIDURAL; INFILTRATION; INTRACAUDAL; PERINEURAL ONCE
Status: CANCELLED | OUTPATIENT
Start: 2019-01-01 | End: 2019-01-01

## 2019-01-01 RX ORDER — SODIUM CHLORIDE 9 MG/ML
INJECTION, SOLUTION INTRAVENOUS ONCE
Status: DISCONTINUED | OUTPATIENT
Start: 2019-01-01 | End: 2019-01-01

## 2019-01-01 RX ORDER — MAGNESIUM SULFATE HEPTAHYDRATE 40 MG/ML
2 INJECTION, SOLUTION INTRAVENOUS
Status: ACTIVE | OUTPATIENT
Start: 2019-01-01 | End: 2019-01-01

## 2019-01-01 RX ORDER — OXYCODONE HYDROCHLORIDE 5 MG/1
5 TABLET ORAL ONCE
Status: COMPLETED | OUTPATIENT
Start: 2019-01-01 | End: 2019-01-01

## 2019-01-01 RX ORDER — PROPOFOL 10 MG/ML
INJECTION, EMULSION INTRAVENOUS
Status: DISCONTINUED
Start: 2019-01-01 | End: 2019-01-01 | Stop reason: WASHOUT

## 2019-01-01 RX ORDER — PREDNISONE 10 MG/1
10 TABLET ORAL DAILY
Status: DISCONTINUED | OUTPATIENT
Start: 2019-01-01 | End: 2019-01-01 | Stop reason: HOSPADM

## 2019-01-01 RX ORDER — NORTRIPTYLINE HYDROCHLORIDE 25 MG/1
CAPSULE ORAL
Qty: 90 CAPSULE | Refills: 1 | Status: ON HOLD | OUTPATIENT
Start: 2019-01-01 | End: 2019-01-01 | Stop reason: HOSPADM

## 2019-01-01 RX ORDER — LORAZEPAM 2 MG/ML
1 INJECTION INTRAMUSCULAR EVERY 30 MIN PRN
Status: DISCONTINUED | OUTPATIENT
Start: 2019-01-01 | End: 2019-01-01 | Stop reason: HOSPADM

## 2019-01-01 RX ORDER — FLUDROCORTISONE ACETATE 0.1 MG/1
100 TABLET ORAL 2 TIMES DAILY
Status: DISCONTINUED | OUTPATIENT
Start: 2019-01-01 | End: 2019-01-01 | Stop reason: HOSPADM

## 2019-01-01 RX ORDER — SODIUM CHLORIDE 9 MG/ML
INJECTION, SOLUTION INTRAVENOUS CONTINUOUS
Status: DISCONTINUED | OUTPATIENT
Start: 2019-01-01 | End: 2019-01-01 | Stop reason: HOSPADM

## 2019-01-01 RX ORDER — FENTANYL CITRATE 50 UG/ML
25 INJECTION, SOLUTION INTRAMUSCULAR; INTRAVENOUS
Status: DISCONTINUED | OUTPATIENT
Start: 2019-01-01 | End: 2019-01-01

## 2019-01-01 RX ORDER — PANTOPRAZOLE SODIUM 40 MG/1
40 FOR SUSPENSION ORAL DAILY
Status: DISCONTINUED | OUTPATIENT
Start: 2019-01-01 | End: 2019-01-01

## 2019-01-01 RX ORDER — SEVELAMER CARBONATE 800 MG/1
800 TABLET, FILM COATED ORAL
Status: DISCONTINUED | OUTPATIENT
Start: 2019-01-01 | End: 2019-01-01

## 2019-01-01 RX ORDER — TIZANIDINE 4 MG/1
4 TABLET ORAL DAILY PRN
Status: DISCONTINUED | OUTPATIENT
Start: 2019-01-01 | End: 2019-01-01 | Stop reason: HOSPADM

## 2019-01-01 RX ORDER — ATORVASTATIN CALCIUM 20 MG/1
80 TABLET, FILM COATED ORAL DAILY
Status: DISCONTINUED | OUTPATIENT
Start: 2019-01-01 | End: 2019-01-01 | Stop reason: HOSPADM

## 2019-01-01 RX ORDER — WARFARIN 2.5 MG/1
2.5 TABLET ORAL
Status: DISCONTINUED | OUTPATIENT
Start: 2019-01-01 | End: 2019-01-01

## 2019-01-01 RX ORDER — ASPIRIN 81 MG/1
81 TABLET ORAL DAILY
Status: ON HOLD | COMMUNITY
End: 2019-01-01 | Stop reason: HOSPADM

## 2019-01-01 RX ORDER — ALBUTEROL SULFATE 2.5 MG/.5ML
15 SOLUTION RESPIRATORY (INHALATION)
Status: DISCONTINUED | OUTPATIENT
Start: 2019-01-01 | End: 2019-01-01

## 2019-01-01 RX ORDER — LIDOCAINE HYDROCHLORIDE 20 MG/ML
INJECTION, SOLUTION INFILTRATION; PERINEURAL
Status: DISCONTINUED | OUTPATIENT
Start: 2019-01-01 | End: 2019-01-01 | Stop reason: HOSPADM

## 2019-01-01 RX ORDER — AMOXICILLIN 250 MG
1 CAPSULE ORAL 2 TIMES DAILY
Status: DISCONTINUED | OUTPATIENT
Start: 2019-01-01 | End: 2019-01-01

## 2019-01-01 RX ORDER — PREDNISOLONE ACETATE 10 MG/ML
1 SUSPENSION/ DROPS OPHTHALMIC 3 TIMES DAILY
Status: DISCONTINUED | OUTPATIENT
Start: 2019-01-01 | End: 2019-01-01 | Stop reason: HOSPADM

## 2019-01-01 RX ORDER — MUPIROCIN 20 MG/G
OINTMENT TOPICAL 2 TIMES DAILY
Qty: 30 G | Refills: 1 | Status: SHIPPED | OUTPATIENT
Start: 2019-01-01 | End: 2019-01-01 | Stop reason: SDUPTHER

## 2019-01-01 RX ORDER — LEVOFLOXACIN 5 MG/ML
750 INJECTION, SOLUTION INTRAVENOUS
Status: COMPLETED | OUTPATIENT
Start: 2019-01-01 | End: 2019-01-01

## 2019-01-01 RX ORDER — SODIUM CHLORIDE 0.9 % (FLUSH) 0.9 %
10 SYRINGE (ML) INJECTION
Status: DISCONTINUED | OUTPATIENT
Start: 2019-01-01 | End: 2019-01-01 | Stop reason: HOSPADM

## 2019-01-01 RX ORDER — ATORVASTATIN CALCIUM 20 MG/1
80 TABLET, FILM COATED ORAL NIGHTLY
Status: DISCONTINUED | OUTPATIENT
Start: 2019-01-01 | End: 2019-01-01

## 2019-01-01 RX ORDER — FENTANYL CITRATE 50 UG/ML
50 INJECTION, SOLUTION INTRAMUSCULAR; INTRAVENOUS
Status: DISCONTINUED | OUTPATIENT
Start: 2019-01-01 | End: 2019-01-01

## 2019-01-01 RX ORDER — AMOXICILLIN 250 MG
1 CAPSULE ORAL 2 TIMES DAILY
Status: DISCONTINUED | OUTPATIENT
Start: 2019-01-01 | End: 2019-01-01 | Stop reason: HOSPADM

## 2019-01-01 RX ORDER — CLOPIDOGREL BISULFATE 75 MG/1
TABLET ORAL
Qty: 90 TABLET | Refills: 0 | Status: ON HOLD | OUTPATIENT
Start: 2019-01-01 | End: 2019-01-01 | Stop reason: HOSPADM

## 2019-01-01 RX ORDER — FENTANYL CITRATE 50 UG/ML
25 INJECTION, SOLUTION INTRAMUSCULAR; INTRAVENOUS EVERY 5 MIN PRN
Status: DISCONTINUED | OUTPATIENT
Start: 2019-01-01 | End: 2019-01-01 | Stop reason: HOSPADM

## 2019-01-01 RX ORDER — MUPIROCIN 20 MG/G
OINTMENT TOPICAL
Status: CANCELLED | OUTPATIENT
Start: 2019-01-01

## 2019-01-01 RX ORDER — NOREPINEPHRINE BITARTRATE/D5W 4MG/250ML
0.02 PLASTIC BAG, INJECTION (ML) INTRAVENOUS CONTINUOUS
Status: DISCONTINUED | OUTPATIENT
Start: 2019-01-01 | End: 2019-01-01

## 2019-01-01 RX ORDER — LISINOPRIL 2.5 MG/1
2.5 TABLET ORAL DAILY
Qty: 90 TABLET | Refills: 3 | Status: ON HOLD | OUTPATIENT
Start: 2019-01-01 | End: 2019-01-01 | Stop reason: HOSPADM

## 2019-01-01 RX ORDER — DEXTROSE 50 % IN WATER (D50W) INTRAVENOUS SYRINGE
25
Status: DISPENSED | OUTPATIENT
Start: 2019-01-01 | End: 2019-01-01

## 2019-01-01 RX ORDER — MIDODRINE HYDROCHLORIDE 5 MG/1
10 TABLET ORAL 3 TIMES DAILY
Status: DISCONTINUED | OUTPATIENT
Start: 2019-01-01 | End: 2019-01-01 | Stop reason: HOSPADM

## 2019-01-01 RX ORDER — NORTRIPTYLINE HYDROCHLORIDE 25 MG/1
CAPSULE ORAL
Refills: 1 | Status: ON HOLD | COMMUNITY
Start: 2018-01-01 | End: 2019-01-01 | Stop reason: HOSPADM

## 2019-01-01 RX ORDER — LEVETIRACETAM 500 MG/1
500 TABLET ORAL 2 TIMES DAILY
Status: CANCELLED | OUTPATIENT
Start: 2019-01-01

## 2019-01-01 RX ORDER — RAMELTEON 8 MG/1
8 TABLET ORAL NIGHTLY PRN
Status: DISCONTINUED | OUTPATIENT
Start: 2019-01-01 | End: 2019-01-01 | Stop reason: HOSPADM

## 2019-01-01 RX ORDER — MUPIROCIN 20 MG/G
OINTMENT TOPICAL
Status: ACTIVE | OUTPATIENT
Start: 2019-01-01

## 2019-01-01 RX ORDER — FENTANYL CITRATE 50 UG/ML
INJECTION, SOLUTION INTRAMUSCULAR; INTRAVENOUS
Status: DISCONTINUED | OUTPATIENT
Start: 2019-01-01 | End: 2019-01-01 | Stop reason: HOSPADM

## 2019-01-01 RX ORDER — MIDAZOLAM HYDROCHLORIDE 1 MG/ML
INJECTION, SOLUTION INTRAMUSCULAR; INTRAVENOUS
Status: DISCONTINUED | OUTPATIENT
Start: 2019-01-01 | End: 2019-01-01

## 2019-01-01 RX ORDER — PROPOFOL 10 MG/ML
INJECTION, EMULSION INTRAVENOUS
Status: COMPLETED
Start: 2019-01-01 | End: 2019-01-01

## 2019-01-01 RX ORDER — CINACALCET 30 MG/1
30 TABLET, FILM COATED ORAL
Status: DISCONTINUED | OUTPATIENT
Start: 2019-01-01 | End: 2019-01-01 | Stop reason: HOSPADM

## 2019-01-01 RX ORDER — IPRATROPIUM BROMIDE AND ALBUTEROL SULFATE 2.5; .5 MG/3ML; MG/3ML
3 SOLUTION RESPIRATORY (INHALATION) EVERY 4 HOURS PRN
Status: DISCONTINUED | OUTPATIENT
Start: 2019-01-01 | End: 2019-01-01 | Stop reason: HOSPADM

## 2019-01-01 RX ORDER — FENTANYL CITRATE 50 UG/ML
100 INJECTION, SOLUTION INTRAMUSCULAR; INTRAVENOUS ONCE
Status: COMPLETED | OUTPATIENT
Start: 2019-01-01 | End: 2019-01-01

## 2019-01-01 RX ORDER — HEPARIN SODIUM 1000 [USP'U]/ML
INJECTION, SOLUTION INTRAVENOUS; SUBCUTANEOUS
Status: DISCONTINUED | OUTPATIENT
Start: 2019-01-01 | End: 2019-01-01 | Stop reason: HOSPADM

## 2019-01-01 RX ORDER — MICONAZOLE NITRATE 2 %
POWDER (GRAM) TOPICAL 2 TIMES DAILY
Status: DISCONTINUED | OUTPATIENT
Start: 2019-01-01 | End: 2019-01-01 | Stop reason: HOSPADM

## 2019-01-01 RX ORDER — LIDOCAINE HYDROCHLORIDE 10 MG/ML
1 INJECTION INFILTRATION; PERINEURAL ONCE
Status: DISCONTINUED | OUTPATIENT
Start: 2019-01-01 | End: 2019-01-01 | Stop reason: HOSPADM

## 2019-01-01 RX ORDER — SUCCINYLCHOLINE CHLORIDE 20 MG/ML
INJECTION INTRAMUSCULAR; INTRAVENOUS
Status: DISCONTINUED
Start: 2019-01-01 | End: 2019-01-01 | Stop reason: WASHOUT

## 2019-01-01 RX ORDER — PANTOPRAZOLE SODIUM 40 MG/1
40 TABLET, DELAYED RELEASE ORAL DAILY
Status: DISCONTINUED | OUTPATIENT
Start: 2019-01-01 | End: 2019-01-01 | Stop reason: HOSPADM

## 2019-01-01 RX ORDER — LEVETIRACETAM 500 MG/1
500 TABLET ORAL 2 TIMES DAILY
Status: DISCONTINUED | OUTPATIENT
Start: 2019-01-01 | End: 2019-01-01

## 2019-01-01 RX ORDER — PANTOPRAZOLE SODIUM 40 MG/1
40 TABLET, DELAYED RELEASE ORAL DAILY
Status: CANCELLED | OUTPATIENT
Start: 2019-01-01

## 2019-01-01 RX ORDER — LEVETIRACETAM 500 MG/1
500 TABLET ORAL 2 TIMES DAILY
Status: DISCONTINUED | OUTPATIENT
Start: 2019-01-01 | End: 2019-01-01 | Stop reason: HOSPADM

## 2019-01-01 RX ORDER — ASPIRIN 81 MG/1
81 TABLET ORAL DAILY
Status: DISCONTINUED | OUTPATIENT
Start: 2019-01-01 | End: 2019-01-01 | Stop reason: HOSPADM

## 2019-01-01 RX ORDER — TIZANIDINE 4 MG/1
TABLET ORAL
Qty: 90 TABLET | Refills: 0 | Status: ON HOLD | OUTPATIENT
Start: 2019-01-01 | End: 2019-01-01 | Stop reason: HOSPADM

## 2019-01-01 RX ORDER — WARFARIN 1 MG/1
3 TABLET ORAL
Status: DISCONTINUED | OUTPATIENT
Start: 2019-01-01 | End: 2019-01-01

## 2019-01-01 RX ORDER — BETAMETHASONE DIPROPIONATE 0.5 MG/G
OINTMENT TOPICAL
Qty: 45 G | Refills: 3 | Status: ON HOLD | OUTPATIENT
Start: 2019-01-01 | End: 2019-01-01

## 2019-01-01 RX ORDER — DOXYCYCLINE 100 MG/1
CAPSULE ORAL
Qty: 20 CAPSULE | Refills: 0 | Status: SHIPPED | OUTPATIENT
Start: 2019-01-01 | End: 2019-01-01

## 2019-01-01 RX ORDER — ETOMIDATE 2 MG/ML
25 INJECTION INTRAVENOUS ONCE
Status: COMPLETED | OUTPATIENT
Start: 2019-01-01 | End: 2019-01-01

## 2019-01-01 RX ORDER — BETAMETHASONE DIPROPIONATE 0.5 MG/G
OINTMENT TOPICAL
Qty: 45 G | Refills: 3 | Status: SHIPPED | OUTPATIENT
Start: 2019-01-01 | End: 2019-01-01 | Stop reason: SDUPTHER

## 2019-01-01 RX ORDER — WARFARIN SODIUM 5 MG/1
TABLET ORAL
Qty: 40 TABLET | Refills: 0 | Status: SHIPPED | OUTPATIENT
Start: 2019-01-01 | End: 2019-01-01 | Stop reason: SDUPTHER

## 2019-01-01 RX ORDER — SODIUM CHLORIDE 0.9 % (FLUSH) 0.9 %
10 SYRINGE (ML) INJECTION
Status: ACTIVE | OUTPATIENT
Start: 2019-01-01

## 2019-01-01 RX ORDER — LIDOCAINE HYDROCHLORIDE 10 MG/ML
1 INJECTION, SOLUTION EPIDURAL; INFILTRATION; INTRACAUDAL; PERINEURAL ONCE
Status: COMPLETED | OUTPATIENT
Start: 2019-01-01 | End: 2019-01-01

## 2019-01-01 RX ORDER — IODIXANOL 320 MG/ML
INJECTION, SOLUTION INTRAVASCULAR
Status: DISCONTINUED | OUTPATIENT
Start: 2019-01-01 | End: 2019-01-01 | Stop reason: HOSPADM

## 2019-01-01 RX ORDER — MIDODRINE HYDROCHLORIDE 5 MG/1
10 TABLET ORAL
Status: COMPLETED | OUTPATIENT
Start: 2019-01-01 | End: 2019-01-01

## 2019-01-01 RX ORDER — BROMFENAC SODIUM 0.7 MG/ML
SOLUTION/ DROPS OPHTHALMIC
Refills: 12 | Status: ON HOLD | COMMUNITY
Start: 2019-01-01 | End: 2019-01-01 | Stop reason: HOSPADM

## 2019-01-01 RX ORDER — WARFARIN SODIUM 5 MG/1
5 TABLET ORAL DAILY
Status: DISCONTINUED | OUTPATIENT
Start: 2019-01-01 | End: 2019-01-01 | Stop reason: HOSPADM

## 2019-01-01 RX ORDER — VANCOMYCIN HCL IN 5 % DEXTROSE 1G/250ML
1000 PLASTIC BAG, INJECTION (ML) INTRAVENOUS ONCE
Status: COMPLETED | OUTPATIENT
Start: 2019-01-01 | End: 2019-01-01

## 2019-01-01 RX ORDER — LINAGLIPTIN 5 MG/1
TABLET, FILM COATED ORAL
Qty: 90 TABLET | Refills: 1 | Status: ON HOLD | OUTPATIENT
Start: 2019-01-01 | End: 2019-01-01 | Stop reason: HOSPADM

## 2019-01-01 RX ORDER — HEPARIN SODIUM 5000 [USP'U]/ML
5000 INJECTION, SOLUTION INTRAVENOUS; SUBCUTANEOUS EVERY 8 HOURS
Status: DISCONTINUED | OUTPATIENT
Start: 2019-01-01 | End: 2019-01-01

## 2019-01-01 RX ORDER — WARFARIN 2.5 MG/1
2.5 TABLET ORAL ONCE
Status: COMPLETED | OUTPATIENT
Start: 2019-01-01 | End: 2019-01-01

## 2019-01-01 RX ORDER — WARFARIN 2.5 MG/1
5 TABLET ORAL ONCE
Status: COMPLETED | OUTPATIENT
Start: 2019-01-01 | End: 2019-01-01

## 2019-01-01 RX ORDER — ACETAMINOPHEN 500 MG
500 TABLET ORAL EVERY 6 HOURS PRN
Status: DISCONTINUED | OUTPATIENT
Start: 2019-01-01 | End: 2019-01-01 | Stop reason: HOSPADM

## 2019-01-01 RX ORDER — LEVETIRACETAM 500 MG/1
TABLET ORAL
Qty: 180 TABLET | Refills: 3 | Status: SHIPPED | OUTPATIENT
Start: 2019-01-01 | End: 2019-01-01 | Stop reason: SDUPTHER

## 2019-01-01 RX ORDER — SODIUM CHLORIDE 9 MG/ML
INJECTION, SOLUTION INTRAVENOUS
Status: DISCONTINUED | OUTPATIENT
Start: 2019-01-01 | End: 2019-01-01 | Stop reason: HOSPADM

## 2019-01-01 RX ORDER — CINACALCET 30 MG/1
30 TABLET, FILM COATED ORAL
Status: CANCELLED | OUTPATIENT
Start: 2019-01-01

## 2019-01-01 RX ORDER — TERBINAFINE HYDROCHLORIDE 250 MG/1
250 TABLET ORAL DAILY
Status: ON HOLD | COMMUNITY
Start: 2019-01-01 | End: 2019-01-01

## 2019-01-01 RX ORDER — CLOPIDOGREL BISULFATE 75 MG/1
TABLET ORAL
Qty: 30 TABLET | Refills: 0 | Status: SHIPPED | OUTPATIENT
Start: 2019-01-01 | End: 2019-01-01 | Stop reason: SDUPTHER

## 2019-01-01 RX ORDER — MIDODRINE HYDROCHLORIDE 5 MG/1
5 TABLET ORAL
Status: DISCONTINUED | OUTPATIENT
Start: 2019-01-01 | End: 2019-01-01 | Stop reason: HOSPADM

## 2019-01-01 RX ORDER — DICYCLOMINE HYDROCHLORIDE 20 MG/1
20 TABLET ORAL EVERY 8 HOURS PRN
Status: DISCONTINUED | OUTPATIENT
Start: 2019-01-01 | End: 2019-01-01 | Stop reason: HOSPADM

## 2019-01-01 RX ORDER — PREDNISONE 10 MG/1
20 TABLET ORAL DAILY
Status: DISCONTINUED | OUTPATIENT
Start: 2019-01-01 | End: 2019-01-01 | Stop reason: HOSPADM

## 2019-01-01 RX ORDER — CICLOPIROX OLAMINE 7.7 MG/G
CREAM TOPICAL DAILY
Status: DISCONTINUED | OUTPATIENT
Start: 2019-01-01 | End: 2019-01-01 | Stop reason: HOSPADM

## 2019-01-01 RX ORDER — ROCURONIUM BROMIDE 10 MG/ML
INJECTION, SOLUTION INTRAVENOUS
Status: COMPLETED
Start: 2019-01-01 | End: 2019-01-01

## 2019-01-01 RX ORDER — WARFARIN SODIUM 5 MG/1
5 TABLET ORAL ONCE
Status: COMPLETED | OUTPATIENT
Start: 2019-01-01 | End: 2019-01-01

## 2019-01-01 RX ORDER — MAGNESIUM SULFATE HEPTAHYDRATE 40 MG/ML
2 INJECTION, SOLUTION INTRAVENOUS ONCE
Status: COMPLETED | OUTPATIENT
Start: 2019-01-01 | End: 2019-01-01

## 2019-01-01 RX ORDER — MUPIROCIN 20 MG/G
OINTMENT TOPICAL
Status: DISCONTINUED | OUTPATIENT
Start: 2019-01-01 | End: 2019-01-01 | Stop reason: HOSPADM

## 2019-01-01 RX ORDER — PROMETHAZINE HYDROCHLORIDE 12.5 MG/1
12.5 TABLET ORAL EVERY 6 HOURS PRN
Status: DISCONTINUED | OUTPATIENT
Start: 2019-01-01 | End: 2019-01-01 | Stop reason: HOSPADM

## 2019-01-01 RX ORDER — SODIUM CHLORIDE 9 MG/ML
INJECTION, SOLUTION INTRAVENOUS ONCE
Status: DISCONTINUED | OUTPATIENT
Start: 2019-01-01 | End: 2019-01-01 | Stop reason: HOSPADM

## 2019-01-01 RX ORDER — DEXMEDETOMIDINE HYDROCHLORIDE 4 UG/ML
0.2 INJECTION, SOLUTION INTRAVENOUS CONTINUOUS
Status: DISCONTINUED | OUTPATIENT
Start: 2019-01-01 | End: 2019-01-01

## 2019-01-01 RX ORDER — FLUDROCORTISONE ACETATE 0.1 MG/1
100 TABLET ORAL DAILY
Status: DISCONTINUED | OUTPATIENT
Start: 2019-01-01 | End: 2019-01-01

## 2019-01-01 RX ORDER — MULTIVITAMIN
1 TABLET ORAL DAILY
Status: ON HOLD | COMMUNITY
End: 2019-01-01 | Stop reason: HOSPADM

## 2019-01-01 RX ORDER — ACETAMINOPHEN 500 MG
1000 TABLET ORAL
Status: COMPLETED | OUTPATIENT
Start: 2019-01-01 | End: 2019-01-01

## 2019-01-01 RX ORDER — AMOXICILLIN 250 MG
1 CAPSULE ORAL 2 TIMES DAILY
Status: ON HOLD | COMMUNITY
Start: 2019-01-01 | End: 2019-01-01 | Stop reason: HOSPADM

## 2019-01-01 RX ORDER — IBUPROFEN 200 MG
16 TABLET ORAL
Status: DISCONTINUED | OUTPATIENT
Start: 2019-01-01 | End: 2019-01-01

## 2019-01-01 RX ORDER — CLOPIDOGREL BISULFATE 75 MG/1
TABLET ORAL
Qty: 30 TABLET | Refills: 0 | Status: ON HOLD | OUTPATIENT
Start: 2019-01-01 | End: 2019-01-01 | Stop reason: HOSPADM

## 2019-01-01 RX ORDER — MIDAZOLAM HYDROCHLORIDE 1 MG/ML
2 INJECTION INTRAMUSCULAR; INTRAVENOUS ONCE
Status: COMPLETED | OUTPATIENT
Start: 2019-01-01 | End: 2019-01-01

## 2019-01-01 RX ORDER — ATORVASTATIN CALCIUM 20 MG/1
80 TABLET, FILM COATED ORAL NIGHTLY
Status: DISCONTINUED | OUTPATIENT
Start: 2019-01-01 | End: 2019-01-01 | Stop reason: HOSPADM

## 2019-01-01 RX ORDER — PREDNISONE 10 MG/1
20 TABLET ORAL DAILY
Status: DISCONTINUED | OUTPATIENT
Start: 2019-01-01 | End: 2019-01-01

## 2019-01-01 RX ORDER — HEPARIN SODIUM 5000 [USP'U]/ML
7500 INJECTION, SOLUTION INTRAVENOUS; SUBCUTANEOUS EVERY 8 HOURS
Status: DISCONTINUED | OUTPATIENT
Start: 2019-01-01 | End: 2019-01-01

## 2019-01-01 RX ORDER — TIZANIDINE 4 MG/1
4 TABLET ORAL NIGHTLY PRN
Status: DISCONTINUED | OUTPATIENT
Start: 2019-01-01 | End: 2019-01-01

## 2019-01-01 RX ORDER — IBUPROFEN 200 MG
24 TABLET ORAL
Status: DISCONTINUED | OUTPATIENT
Start: 2019-01-01 | End: 2019-01-01

## 2019-01-01 RX ORDER — MIDODRINE HYDROCHLORIDE 5 MG/1
5 TABLET ORAL ONCE
Status: COMPLETED | OUTPATIENT
Start: 2019-01-01 | End: 2019-01-01

## 2019-01-01 RX ORDER — ETOMIDATE 2 MG/ML
INJECTION INTRAVENOUS
Status: COMPLETED
Start: 2019-01-01 | End: 2019-01-01

## 2019-01-01 RX ORDER — MIDODRINE HYDROCHLORIDE 5 MG/1
15 TABLET ORAL 3 TIMES DAILY
Status: DISCONTINUED | OUTPATIENT
Start: 2019-01-01 | End: 2019-01-01

## 2019-01-01 RX ORDER — ATORVASTATIN CALCIUM 80 MG/1
TABLET, FILM COATED ORAL
Qty: 90 TABLET | Refills: 1 | Status: ON HOLD | OUTPATIENT
Start: 2019-01-01 | End: 2019-01-01 | Stop reason: HOSPADM

## 2019-01-01 RX ORDER — OXYCODONE HYDROCHLORIDE 5 MG/1
5 TABLET ORAL EVERY 6 HOURS PRN
Status: DISCONTINUED | OUTPATIENT
Start: 2019-01-01 | End: 2019-01-01 | Stop reason: HOSPADM

## 2019-01-01 RX ORDER — PREDNISONE 20 MG/1
10 TABLET ORAL DAILY
Qty: 30 TABLET | Refills: 2 | Status: ON HOLD | OUTPATIENT
Start: 2019-01-01 | End: 2019-01-01 | Stop reason: HOSPADM

## 2019-01-01 RX ORDER — OXYCODONE AND ACETAMINOPHEN 5; 325 MG/1; MG/1
1 TABLET ORAL 3 TIMES DAILY PRN
Qty: 20 TABLET | Refills: 0 | Status: ON HOLD | OUTPATIENT
Start: 2019-01-01 | End: 2019-01-01 | Stop reason: HOSPADM

## 2019-01-01 RX ORDER — PREDNISONE 20 MG/1
20 TABLET ORAL DAILY
Status: DISCONTINUED | OUTPATIENT
Start: 2019-01-01 | End: 2019-01-01

## 2019-01-01 RX ORDER — LIDOCAINE HYDROCHLORIDE 10 MG/ML
INJECTION INFILTRATION; PERINEURAL
Status: COMPLETED
Start: 2019-01-01 | End: 2019-01-01

## 2019-01-01 RX ORDER — ONDANSETRON 8 MG/1
8 TABLET, ORALLY DISINTEGRATING ORAL EVERY 8 HOURS PRN
Status: DISCONTINUED | OUTPATIENT
Start: 2019-01-01 | End: 2019-01-01

## 2019-01-01 RX ORDER — FENTANYL CITRATE-0.9 % NACL/PF 10 MCG/ML
PLASTIC BAG, INJECTION (ML) INTRAVENOUS CONTINUOUS
Status: DISCONTINUED | OUTPATIENT
Start: 2019-01-01 | End: 2019-01-01 | Stop reason: HOSPADM

## 2019-01-01 RX ORDER — VANCOMYCIN 2 GRAM/500 ML IN 0.9 % SODIUM CHLORIDE INTRAVENOUS
2000 ONCE
Status: DISCONTINUED | OUTPATIENT
Start: 2019-01-01 | End: 2019-01-01

## 2019-01-01 RX ORDER — WARFARIN 2.5 MG/1
2.5 TABLET ORAL
Status: DISCONTINUED | OUTPATIENT
Start: 2019-01-01 | End: 2019-01-01 | Stop reason: HOSPADM

## 2019-01-01 RX ORDER — TIZANIDINE 4 MG/1
4 TABLET ORAL NIGHTLY PRN
Status: ON HOLD | COMMUNITY
End: 2019-01-01 | Stop reason: HOSPADM

## 2019-01-01 RX ORDER — WARFARIN 2.5 MG/1
5 TABLET ORAL
Status: DISCONTINUED | OUTPATIENT
Start: 2019-01-01 | End: 2019-01-01 | Stop reason: HOSPADM

## 2019-01-01 RX ORDER — BISACODYL 10 MG
10 SUPPOSITORY, RECTAL RECTAL DAILY PRN
Status: DISCONTINUED | OUTPATIENT
Start: 2019-01-01 | End: 2019-01-01 | Stop reason: HOSPADM

## 2019-01-01 RX ORDER — FENTANYL CITRATE 50 UG/ML
50 INJECTION, SOLUTION INTRAMUSCULAR; INTRAVENOUS ONCE
Status: COMPLETED | OUTPATIENT
Start: 2019-01-01 | End: 2019-01-01

## 2019-01-01 RX ORDER — PREDNISOLONE ACETATE 10 MG/ML
1 SUSPENSION/ DROPS OPHTHALMIC 3 TIMES DAILY
Status: DISCONTINUED | OUTPATIENT
Start: 2019-01-01 | End: 2019-01-01

## 2019-01-01 RX ORDER — CEFAZOLIN SODIUM 1 G/3ML
2 INJECTION, POWDER, FOR SOLUTION INTRAMUSCULAR; INTRAVENOUS
Status: ACTIVE | OUTPATIENT
Start: 2019-01-01

## 2019-01-01 RX ORDER — ROCURONIUM BROMIDE 10 MG/ML
INJECTION, SOLUTION INTRAVENOUS
Status: DISPENSED
Start: 2019-01-01 | End: 2019-01-01

## 2019-01-01 RX ORDER — WARFARIN SODIUM 5 MG/1
5 TABLET ORAL DAILY
Status: DISCONTINUED | OUTPATIENT
Start: 2019-01-01 | End: 2019-01-01

## 2019-01-01 RX ORDER — FENTANYL CITRATE 50 UG/ML
INJECTION, SOLUTION INTRAMUSCULAR; INTRAVENOUS
Status: COMPLETED
Start: 2019-01-01 | End: 2019-01-01

## 2019-01-01 RX ORDER — WARFARIN SODIUM 5 MG/1
5 TABLET ORAL
Status: DISCONTINUED | OUTPATIENT
Start: 2019-01-01 | End: 2019-01-01

## 2019-01-01 RX ORDER — PREDNISONE 20 MG/1
20 TABLET ORAL DAILY
Qty: 30 TABLET | Refills: 2 | Status: ON HOLD | OUTPATIENT
Start: 2019-01-01 | End: 2019-01-01 | Stop reason: SDUPTHER

## 2019-01-01 RX ORDER — NITROGLYCERIN 5 MG/ML
INJECTION, SOLUTION INTRAVENOUS
Status: DISCONTINUED | OUTPATIENT
Start: 2019-01-01 | End: 2019-01-01 | Stop reason: HOSPADM

## 2019-01-01 RX ORDER — SEVELAMER CARBONATE 800 MG/1
800 TABLET, FILM COATED ORAL
Qty: 30 TABLET | Refills: 3 | Status: ON HOLD | OUTPATIENT
Start: 2019-01-01 | End: 2019-01-01 | Stop reason: HOSPADM

## 2019-01-01 RX ORDER — MIDAZOLAM HYDROCHLORIDE 1 MG/ML
2 INJECTION INTRAMUSCULAR; INTRAVENOUS ONCE
Status: DISCONTINUED | OUTPATIENT
Start: 2019-01-01 | End: 2019-01-01

## 2019-01-01 RX ORDER — MIDODRINE HYDROCHLORIDE 10 MG/1
10 TABLET ORAL 3 TIMES DAILY
Qty: 90 TABLET | Refills: 11 | Status: ON HOLD | OUTPATIENT
Start: 2019-01-01 | End: 2019-01-01 | Stop reason: HOSPADM

## 2019-01-01 RX ORDER — MIDODRINE HYDROCHLORIDE 5 MG/1
15 TABLET ORAL DAILY PRN
Status: DISCONTINUED | OUTPATIENT
Start: 2019-01-01 | End: 2019-01-01 | Stop reason: HOSPADM

## 2019-01-01 RX ORDER — BENZONATATE 100 MG/1
200 CAPSULE ORAL 3 TIMES DAILY PRN
Status: DISCONTINUED | OUTPATIENT
Start: 2019-01-01 | End: 2019-01-01 | Stop reason: HOSPADM

## 2019-01-01 RX ORDER — ONDANSETRON 8 MG/1
8 TABLET, ORALLY DISINTEGRATING ORAL EVERY 8 HOURS PRN
Status: DISCONTINUED | OUTPATIENT
Start: 2019-01-01 | End: 2019-01-01 | Stop reason: HOSPADM

## 2019-01-01 RX ORDER — FLUDROCORTISONE ACETATE 0.1 MG/1
200 TABLET ORAL 2 TIMES DAILY
Qty: 60 TABLET | Refills: 5 | Status: ON HOLD | OUTPATIENT
Start: 2019-01-01 | End: 2019-01-01 | Stop reason: HOSPADM

## 2019-01-01 RX ORDER — LORAZEPAM 1 MG/1
1 TABLET ORAL EVERY 6 HOURS PRN
Status: DISCONTINUED | OUTPATIENT
Start: 2019-01-01 | End: 2019-01-01 | Stop reason: HOSPADM

## 2019-01-01 RX ORDER — OXYCODONE AND ACETAMINOPHEN 5; 325 MG/1; MG/1
1 TABLET ORAL 3 TIMES DAILY PRN
Status: DISCONTINUED | OUTPATIENT
Start: 2019-01-01 | End: 2019-01-01

## 2019-01-01 RX ORDER — FLUDROCORTISONE ACETATE 0.1 MG/1
100 TABLET ORAL ONCE
Status: COMPLETED | OUTPATIENT
Start: 2019-01-01 | End: 2019-01-01

## 2019-01-01 RX ORDER — HYDROCODONE BITARTRATE AND ACETAMINOPHEN 5; 325 MG/1; MG/1
1 TABLET ORAL EVERY 4 HOURS PRN
Status: DISCONTINUED | OUTPATIENT
Start: 2019-01-01 | End: 2019-01-01 | Stop reason: HOSPADM

## 2019-01-01 RX ORDER — FENTANYL CITRATE 50 UG/ML
50 INJECTION, SOLUTION INTRAMUSCULAR; INTRAVENOUS
Status: ACTIVE | OUTPATIENT
Start: 2019-01-01 | End: 2019-01-01

## 2019-01-01 RX ORDER — NOREPINEPHRINE BITARTRATE/D5W 4MG/250ML
PLASTIC BAG, INJECTION (ML) INTRAVENOUS
Status: COMPLETED
Start: 2019-01-01 | End: 2019-01-01

## 2019-01-01 RX ORDER — MUPIROCIN 20 MG/G
OINTMENT TOPICAL 2 TIMES DAILY
Qty: 30 G | Refills: 1 | Status: SHIPPED | OUTPATIENT
Start: 2019-01-01 | End: 2019-01-01 | Stop reason: ALTCHOICE

## 2019-01-01 RX ORDER — HEPARIN SODIUM,PORCINE/D5W 25000/250
13 INTRAVENOUS SOLUTION INTRAVENOUS CONTINUOUS
Status: DISCONTINUED | OUTPATIENT
Start: 2019-01-01 | End: 2019-01-01

## 2019-01-01 RX ORDER — PHENYLEPHRINE HCL IN 0.9% NACL 1 MG/10 ML
SYRINGE (ML) INTRAVENOUS
Status: COMPLETED
Start: 2019-01-01 | End: 2019-01-01

## 2019-01-01 RX ORDER — IPRATROPIUM BROMIDE AND ALBUTEROL SULFATE 2.5; .5 MG/3ML; MG/3ML
3 SOLUTION RESPIRATORY (INHALATION) EVERY 4 HOURS PRN
Status: CANCELLED | OUTPATIENT
Start: 2019-01-01

## 2019-01-01 RX ORDER — LORAZEPAM 1 MG/1
0.5 TABLET ORAL EVERY 8 HOURS PRN
Qty: 14 TABLET | Refills: 0 | Status: ON HOLD | OUTPATIENT
Start: 2019-01-01 | End: 2019-01-01 | Stop reason: HOSPADM

## 2019-01-01 RX ORDER — SODIUM CHLORIDE 9 MG/ML
INJECTION, SOLUTION INTRAVENOUS CONTINUOUS
Status: CANCELLED | OUTPATIENT
Start: 2019-01-01

## 2019-01-01 RX ORDER — ALBUTEROL SULFATE 2.5 MG/.5ML
10 SOLUTION RESPIRATORY (INHALATION)
Status: COMPLETED | OUTPATIENT
Start: 2019-01-01 | End: 2019-01-01

## 2019-01-01 RX ORDER — MEROPENEM AND SODIUM CHLORIDE 1 G/50ML
1 INJECTION, SOLUTION INTRAVENOUS
Status: DISCONTINUED | OUTPATIENT
Start: 2019-01-01 | End: 2019-01-01

## 2019-01-01 RX ORDER — SODIUM CHLORIDE 9 MG/ML
500 INJECTION, SOLUTION INTRAVENOUS
Status: COMPLETED | OUTPATIENT
Start: 2019-01-01 | End: 2019-01-01

## 2019-01-01 RX ORDER — SODIUM CHLORIDE 0.9 % (FLUSH) 0.9 %
5 SYRINGE (ML) INJECTION
Status: DISCONTINUED | OUTPATIENT
Start: 2019-01-01 | End: 2019-01-01 | Stop reason: HOSPADM

## 2019-01-01 RX ORDER — WARFARIN 1 MG/1
1 TABLET ORAL ONCE
Status: COMPLETED | OUTPATIENT
Start: 2019-01-01 | End: 2019-01-01

## 2019-01-01 RX ORDER — FLUDROCORTISONE ACETATE 0.1 MG/1
100 TABLET ORAL 2 TIMES DAILY
Status: CANCELLED | OUTPATIENT
Start: 2019-01-01

## 2019-01-01 RX ORDER — WARFARIN SODIUM 5 MG/1
2.5-5 TABLET ORAL DAILY
Qty: 90 TABLET | Refills: 3 | Status: ON HOLD | OUTPATIENT
Start: 2019-01-01 | End: 2019-01-01 | Stop reason: HOSPADM

## 2019-01-01 RX ORDER — PANTOPRAZOLE SODIUM 20 MG/1
40 TABLET, DELAYED RELEASE ORAL DAILY
Status: DISCONTINUED | OUTPATIENT
Start: 2019-01-01 | End: 2019-01-01 | Stop reason: HOSPADM

## 2019-01-01 RX ORDER — CLOPIDOGREL BISULFATE 75 MG/1
75 TABLET ORAL DAILY
Status: CANCELLED | OUTPATIENT
Start: 2019-01-01

## 2019-01-01 RX ORDER — SUCCINYLCHOLINE CHLORIDE 20 MG/ML
1 INJECTION INTRAMUSCULAR; INTRAVENOUS ONCE
Status: DISCONTINUED | OUTPATIENT
Start: 2019-01-01 | End: 2019-01-01

## 2019-01-01 RX ORDER — ACETAMINOPHEN 325 MG/1
650 TABLET ORAL
Status: COMPLETED | OUTPATIENT
Start: 2019-01-01 | End: 2019-01-01

## 2019-01-01 RX ORDER — CEFAZOLIN SODIUM 1 G/3ML
2 INJECTION, POWDER, FOR SOLUTION INTRAMUSCULAR; INTRAVENOUS
Status: COMPLETED | OUTPATIENT
Start: 2019-01-01 | End: 2019-01-01

## 2019-01-01 RX ORDER — HYDROCODONE BITARTRATE AND ACETAMINOPHEN 500; 5 MG/1; MG/1
TABLET ORAL
Status: DISCONTINUED | OUTPATIENT
Start: 2019-01-01 | End: 2019-01-01 | Stop reason: HOSPADM

## 2019-01-01 RX ORDER — INSULIN ASPART 100 [IU]/ML
0-5 INJECTION, SOLUTION INTRAVENOUS; SUBCUTANEOUS
Status: DISCONTINUED | OUTPATIENT
Start: 2019-01-01 | End: 2019-01-01 | Stop reason: HOSPADM

## 2019-01-01 RX ORDER — IBUPROFEN 200 MG
16 TABLET ORAL
Status: CANCELLED | OUTPATIENT
Start: 2019-01-01

## 2019-01-01 RX ORDER — FAMOTIDINE 40 MG/5ML
20 POWDER, FOR SUSPENSION ORAL DAILY PRN
Status: DISCONTINUED | OUTPATIENT
Start: 2019-01-01 | End: 2019-01-01

## 2019-01-01 RX ORDER — POLYETHYLENE GLYCOL 3350 17 G/17G
17 POWDER, FOR SOLUTION ORAL DAILY
Status: DISCONTINUED | OUTPATIENT
Start: 2019-01-01 | End: 2019-01-01

## 2019-01-01 RX ORDER — PROPOFOL 10 MG/ML
5 INJECTION, EMULSION INTRAVENOUS CONTINUOUS
Status: DISCONTINUED | OUTPATIENT
Start: 2019-01-01 | End: 2019-01-01

## 2019-01-01 RX ORDER — NORTRIPTYLINE HYDROCHLORIDE 25 MG/1
25 CAPSULE ORAL NIGHTLY
Status: DISCONTINUED | OUTPATIENT
Start: 2019-01-01 | End: 2019-01-01

## 2019-01-01 RX ORDER — LIDOCAINE HYDROCHLORIDE 10 MG/ML
1 INJECTION, SOLUTION EPIDURAL; INFILTRATION; INTRACAUDAL; PERINEURAL ONCE
Status: ACTIVE | OUTPATIENT
Start: 2019-01-01

## 2019-01-01 RX ORDER — ACETAMINOPHEN 325 MG/1
650 TABLET ORAL EVERY 4 HOURS PRN
Status: CANCELLED | OUTPATIENT
Start: 2019-01-01

## 2019-01-01 RX ORDER — PROMETHAZINE HYDROCHLORIDE 12.5 MG/1
25 TABLET ORAL EVERY 6 HOURS PRN
Status: DISCONTINUED | OUTPATIENT
Start: 2019-01-01 | End: 2019-01-01 | Stop reason: HOSPADM

## 2019-01-01 RX ORDER — PREGABALIN 50 MG/1
100 CAPSULE ORAL 3 TIMES DAILY
Status: DISCONTINUED | OUTPATIENT
Start: 2019-01-01 | End: 2019-01-01 | Stop reason: HOSPADM

## 2019-01-01 RX ORDER — WARFARIN 2.5 MG/1
5 TABLET ORAL
Status: DISCONTINUED | OUTPATIENT
Start: 2019-01-01 | End: 2019-01-01

## 2019-01-01 RX ORDER — FENTANYL CITRATE 50 UG/ML
25 INJECTION, SOLUTION INTRAMUSCULAR; INTRAVENOUS
Status: DISPENSED | OUTPATIENT
Start: 2019-01-01 | End: 2019-01-01

## 2019-01-01 RX ORDER — IPRATROPIUM BROMIDE AND ALBUTEROL SULFATE 2.5; .5 MG/3ML; MG/3ML
3 SOLUTION RESPIRATORY (INHALATION) EVERY 6 HOURS PRN
Status: DISCONTINUED | OUTPATIENT
Start: 2019-01-01 | End: 2019-01-01

## 2019-01-01 RX ORDER — LIDOCAINE HYDROCHLORIDE 10 MG/ML
10 INJECTION INFILTRATION; PERINEURAL ONCE
Status: COMPLETED | OUTPATIENT
Start: 2019-01-01 | End: 2019-01-01

## 2019-01-01 RX ORDER — HYDROXYZINE HYDROCHLORIDE 25 MG/1
25 TABLET, FILM COATED ORAL 3 TIMES DAILY PRN
Status: DISCONTINUED | OUTPATIENT
Start: 2019-01-01 | End: 2019-01-01 | Stop reason: HOSPADM

## 2019-01-01 RX ORDER — INSULIN ASPART 100 [IU]/ML
0-5 INJECTION, SOLUTION INTRAVENOUS; SUBCUTANEOUS
Status: DISCONTINUED | OUTPATIENT
Start: 2019-01-01 | End: 2019-01-01

## 2019-01-01 RX ORDER — WARFARIN SODIUM 5 MG/1
TABLET ORAL
Qty: 30 TABLET | Refills: 3 | Status: ON HOLD | OUTPATIENT
Start: 2019-01-01 | End: 2019-01-01 | Stop reason: HOSPADM

## 2019-01-01 RX ORDER — MAGNESIUM SULFATE HEPTAHYDRATE 40 MG/ML
2 INJECTION, SOLUTION INTRAVENOUS
Status: DISPENSED | OUTPATIENT
Start: 2019-01-01 | End: 2019-01-01

## 2019-01-01 RX ORDER — HEPARIN SODIUM 200 [USP'U]/100ML
INJECTION, SOLUTION INTRAVENOUS
Status: DISCONTINUED | OUTPATIENT
Start: 2019-01-01 | End: 2019-01-01 | Stop reason: HOSPADM

## 2019-01-01 RX ORDER — FLUDROCORTISONE ACETATE 0.1 MG/1
100 TABLET ORAL 2 TIMES DAILY
Status: DISCONTINUED | OUTPATIENT
Start: 2019-01-01 | End: 2019-01-01

## 2019-01-01 RX ORDER — MIDODRINE HYDROCHLORIDE 5 MG/1
10 TABLET ORAL 3 TIMES DAILY
Status: CANCELLED | OUTPATIENT
Start: 2019-01-01

## 2019-01-01 RX ORDER — PREDNISONE 20 MG/1
20 TABLET ORAL DAILY
Qty: 30 TABLET | Refills: 3 | Status: ON HOLD | OUTPATIENT
Start: 2019-01-01 | End: 2019-01-01 | Stop reason: HOSPADM

## 2019-01-01 RX ORDER — LEVOFLOXACIN 5 MG/ML
500 INJECTION, SOLUTION INTRAVENOUS
Status: DISCONTINUED | OUTPATIENT
Start: 2019-01-01 | End: 2019-01-01

## 2019-01-01 RX ORDER — VANCOMYCIN HCL IN 5 % DEXTROSE 1.5G/250ML
1500 PLASTIC BAG, INJECTION (ML) INTRAVENOUS
Status: DISCONTINUED | OUTPATIENT
Start: 2019-01-01 | End: 2019-01-01 | Stop reason: HOSPADM

## 2019-01-01 RX ORDER — IPRATROPIUM BROMIDE AND ALBUTEROL SULFATE 2.5; .5 MG/3ML; MG/3ML
3 SOLUTION RESPIRATORY (INHALATION) EVERY 6 HOURS
Status: DISCONTINUED | OUTPATIENT
Start: 2019-01-01 | End: 2019-01-01 | Stop reason: HOSPADM

## 2019-01-01 RX ORDER — SULFAMETHOXAZOLE AND TRIMETHOPRIM 800; 160 MG/1; MG/1
1 TABLET ORAL 2 TIMES DAILY
Status: DISCONTINUED | OUTPATIENT
Start: 2019-01-01 | End: 2019-01-01

## 2019-01-01 RX ORDER — FENTANYL CITRATE 50 UG/ML
50 INJECTION, SOLUTION INTRAMUSCULAR; INTRAVENOUS EVERY 30 MIN PRN
Status: DISCONTINUED | OUTPATIENT
Start: 2019-01-01 | End: 2019-01-01

## 2019-01-01 RX ORDER — PROMETHAZINE HYDROCHLORIDE 12.5 MG/1
12.5 TABLET ORAL EVERY 6 HOURS PRN
Status: COMPLETED | OUTPATIENT
Start: 2019-01-01 | End: 2019-01-01

## 2019-01-01 RX ORDER — LORAZEPAM 2 MG/ML
1 INJECTION INTRAMUSCULAR ONCE
Status: COMPLETED | OUTPATIENT
Start: 2019-01-01 | End: 2019-01-01

## 2019-01-01 RX ORDER — MICONAZOLE NITRATE 2 %
POWDER (GRAM) TOPICAL 2 TIMES DAILY
Status: CANCELLED | OUTPATIENT
Start: 2019-01-01

## 2019-01-01 RX ORDER — MIDAZOLAM HYDROCHLORIDE 1 MG/ML
INJECTION, SOLUTION INTRAMUSCULAR; INTRAVENOUS
Status: DISCONTINUED | OUTPATIENT
Start: 2019-01-01 | End: 2019-01-01 | Stop reason: HOSPADM

## 2019-01-01 RX ORDER — PANTOPRAZOLE SODIUM 40 MG/1
40 TABLET, DELAYED RELEASE ORAL DAILY
Status: DISCONTINUED | OUTPATIENT
Start: 2019-01-01 | End: 2019-01-01

## 2019-01-01 RX ORDER — VANCOMYCIN HCL IN 5 % DEXTROSE 1.25 G/25
1250 PLASTIC BAG, INJECTION (ML) INTRAVENOUS
Status: DISCONTINUED | OUTPATIENT
Start: 2019-01-01 | End: 2019-01-01

## 2019-01-01 RX ORDER — WARFARIN 2.5 MG/1
2.5 TABLET ORAL DAILY
Status: DISCONTINUED | OUTPATIENT
Start: 2019-01-01 | End: 2019-01-01

## 2019-01-01 RX ORDER — POTASSIUM CHLORIDE 20 MEQ/15ML
30 SOLUTION ORAL DAILY
Status: DISCONTINUED | OUTPATIENT
Start: 2019-01-01 | End: 2019-01-01

## 2019-01-01 RX ORDER — ONDANSETRON 2 MG/ML
4 INJECTION INTRAMUSCULAR; INTRAVENOUS ONCE AS NEEDED
Status: DISCONTINUED | OUTPATIENT
Start: 2019-01-01 | End: 2019-01-01 | Stop reason: HOSPADM

## 2019-01-01 RX ORDER — IPRATROPIUM BROMIDE AND ALBUTEROL SULFATE 2.5; .5 MG/3ML; MG/3ML
3 SOLUTION RESPIRATORY (INHALATION)
Status: COMPLETED | OUTPATIENT
Start: 2019-01-01 | End: 2019-01-01

## 2019-01-01 RX ORDER — INSULIN ASPART 100 [IU]/ML
1-10 INJECTION, SOLUTION INTRAVENOUS; SUBCUTANEOUS EVERY 6 HOURS PRN
Status: DISCONTINUED | OUTPATIENT
Start: 2019-01-01 | End: 2019-01-01 | Stop reason: HOSPADM

## 2019-01-01 RX ORDER — MIDAZOLAM HYDROCHLORIDE 1 MG/ML
INJECTION INTRAMUSCULAR; INTRAVENOUS
Status: COMPLETED
Start: 2019-01-01 | End: 2019-01-01

## 2019-01-01 RX ORDER — DEXTROSE 50 % IN WATER (D50W) INTRAVENOUS SYRINGE
Status: DISPENSED
Start: 2019-01-01 | End: 2019-01-01

## 2019-01-01 RX ORDER — HYDROCODONE BITARTRATE AND ACETAMINOPHEN 5; 325 MG/1; MG/1
1 TABLET ORAL EVERY 6 HOURS PRN
Status: DISCONTINUED | OUTPATIENT
Start: 2019-01-01 | End: 2019-01-01

## 2019-01-01 RX ORDER — SODIUM POLYSTYRENE SULFONATE 4.1 MEQ/G
15 POWDER, FOR SUSPENSION ORAL; RECTAL DAILY
Status: DISCONTINUED | OUTPATIENT
Start: 2019-01-01 | End: 2019-01-01 | Stop reason: HOSPADM

## 2019-01-01 RX ORDER — POLYETHYLENE GLYCOL 3350 17 G/17G
17 POWDER, FOR SOLUTION ORAL DAILY
Status: DISCONTINUED | OUTPATIENT
Start: 2019-01-01 | End: 2019-01-01 | Stop reason: HOSPADM

## 2019-01-01 RX ORDER — CHLORHEXIDINE GLUCONATE ORAL RINSE 1.2 MG/ML
15 SOLUTION DENTAL 2 TIMES DAILY
Status: DISCONTINUED | OUTPATIENT
Start: 2019-01-01 | End: 2019-01-01

## 2019-01-01 RX ORDER — LIDOCAINE HYDROCHLORIDE 10 MG/ML
INJECTION INFILTRATION; PERINEURAL
Status: DISPENSED
Start: 2019-01-01 | End: 2019-01-01

## 2019-01-01 RX ORDER — PARICALCITOL 5 UG/ML
0.04 INJECTION, SOLUTION INTRAVENOUS
Status: DISCONTINUED | OUTPATIENT
Start: 2019-01-01 | End: 2019-01-01 | Stop reason: HOSPADM

## 2019-01-01 RX ORDER — SEVELAMER CARBONATE 800 MG/1
1600 TABLET, FILM COATED ORAL
Status: DISCONTINUED | OUTPATIENT
Start: 2019-01-01 | End: 2019-01-01 | Stop reason: HOSPADM

## 2019-01-01 RX ORDER — POLYETHYLENE GLYCOL 3350 17 G/17G
17 POWDER, FOR SOLUTION ORAL 2 TIMES DAILY PRN
Status: DISCONTINUED | OUTPATIENT
Start: 2019-01-01 | End: 2019-01-01 | Stop reason: HOSPADM

## 2019-01-01 RX ORDER — PROMETHAZINE HYDROCHLORIDE 12.5 MG/1
12.5 TABLET ORAL EVERY 6 HOURS PRN
Status: CANCELLED | OUTPATIENT
Start: 2019-01-01

## 2019-01-01 RX ORDER — PHENYLEPHRINE HCL IN 0.9% NACL 1 MG/10 ML
SYRINGE (ML) INTRAVENOUS
Status: DISPENSED
Start: 2019-01-01 | End: 2019-01-01

## 2019-01-01 RX ORDER — MICONAZOLE NITRATE 2 %
POWDER (GRAM) TOPICAL 2 TIMES DAILY
Status: DISCONTINUED | OUTPATIENT
Start: 2019-01-01 | End: 2019-01-01

## 2019-01-01 RX ORDER — CLOPIDOGREL BISULFATE 75 MG/1
75 TABLET ORAL DAILY
Status: DISCONTINUED | OUTPATIENT
Start: 2019-01-01 | End: 2019-01-01

## 2019-01-01 RX ORDER — FLUDROCORTISONE ACETATE 0.1 MG/1
200 TABLET ORAL 2 TIMES DAILY
Status: DISCONTINUED | OUTPATIENT
Start: 2019-01-01 | End: 2019-01-01

## 2019-01-01 RX ORDER — PREGABALIN 100 MG/1
100 CAPSULE ORAL 3 TIMES DAILY
Qty: 270 CAPSULE | Refills: 1 | Status: ON HOLD | OUTPATIENT
Start: 2019-01-01 | End: 2019-01-01 | Stop reason: HOSPADM

## 2019-01-01 RX ORDER — IBUPROFEN 200 MG
24 TABLET ORAL
Status: CANCELLED | OUTPATIENT
Start: 2019-01-01

## 2019-01-01 RX ORDER — PREDNISONE 10 MG/1
10 TABLET ORAL DAILY
Status: CANCELLED | OUTPATIENT
Start: 2019-01-01

## 2019-01-01 RX ORDER — WARFARIN SODIUM 5 MG/1
TABLET ORAL
Qty: 120 TABLET | Refills: 0 | Status: CANCELLED | OUTPATIENT
Start: 2019-01-01

## 2019-01-01 RX ORDER — AMOXICILLIN 250 MG
1 CAPSULE ORAL 2 TIMES DAILY
Status: CANCELLED | OUTPATIENT
Start: 2019-01-01

## 2019-01-01 RX ORDER — WARFARIN SODIUM 5 MG/1
TABLET ORAL
Qty: 120 TABLET | Refills: 0 | Status: ON HOLD | OUTPATIENT
Start: 2019-01-01 | End: 2019-01-01 | Stop reason: HOSPADM

## 2019-01-01 RX ORDER — LEVETIRACETAM 5 MG/ML
500 INJECTION INTRAVASCULAR EVERY 12 HOURS
Status: DISCONTINUED | OUTPATIENT
Start: 2019-01-01 | End: 2019-01-01

## 2019-01-01 RX ORDER — MICONAZOLE NITRATE 2 %
POWDER (GRAM) TOPICAL 2 TIMES DAILY
Refills: 0 | Status: ON HOLD | COMMUNITY
Start: 2019-01-01 | End: 2019-01-01 | Stop reason: HOSPADM

## 2019-01-01 RX ORDER — VANCOMYCIN/0.9 % SOD CHLORIDE 1 G/100 ML
1000 PLASTIC BAG, INJECTION (ML) INTRAVENOUS
Status: DISCONTINUED | OUTPATIENT
Start: 2019-01-01 | End: 2019-01-01

## 2019-01-01 RX ORDER — HYDROCODONE BITARTRATE AND ACETAMINOPHEN 500; 5 MG/1; MG/1
TABLET ORAL
Status: DISCONTINUED | OUTPATIENT
Start: 2019-01-01 | End: 2019-01-01

## 2019-01-01 RX ORDER — LISINOPRIL 2.5 MG/1
2.5 TABLET ORAL DAILY
Status: DISCONTINUED | OUTPATIENT
Start: 2019-01-01 | End: 2019-01-01 | Stop reason: HOSPADM

## 2019-01-01 RX ORDER — LEVOFLOXACIN 500 MG/1
500 TABLET, FILM COATED ORAL
Qty: 3 TABLET | Refills: 0 | Status: SHIPPED | OUTPATIENT
Start: 2019-01-01 | End: 2019-01-01

## 2019-01-01 RX ORDER — ASPIRIN 81 MG/1
81 TABLET ORAL DAILY
Status: DISCONTINUED | OUTPATIENT
Start: 2019-01-01 | End: 2019-01-01

## 2019-01-01 RX ORDER — ACETAMINOPHEN 325 MG/1
650 TABLET ORAL EVERY 4 HOURS PRN
Status: DISCONTINUED | OUTPATIENT
Start: 2019-01-01 | End: 2019-01-01

## 2019-01-01 RX ORDER — SODIUM CHLORIDE 9 MG/ML
INJECTION, SOLUTION INTRAVENOUS CONTINUOUS PRN
Status: DISCONTINUED | OUTPATIENT
Start: 2019-01-01 | End: 2019-01-01

## 2019-01-01 RX ORDER — FENTANYL CITRATE-0.9 % NACL/PF 10 MCG/ML
25 PLASTIC BAG, INJECTION (ML) INTRAVENOUS CONTINUOUS
Status: DISCONTINUED | OUTPATIENT
Start: 2019-01-01 | End: 2019-01-01

## 2019-01-01 RX ORDER — PROMETHAZINE HYDROCHLORIDE 25 MG/1
25 TABLET ORAL EVERY 6 HOURS PRN
Status: DISCONTINUED | OUTPATIENT
Start: 2019-01-01 | End: 2019-01-01 | Stop reason: HOSPADM

## 2019-01-01 RX ORDER — PREDNISONE 20 MG/1
20 TABLET ORAL DAILY
Status: COMPLETED | OUTPATIENT
Start: 2019-01-01 | End: 2019-01-01

## 2019-01-01 RX ORDER — CICLOPIROX 80 MG/ML
SOLUTION TOPICAL
Qty: 1 BOTTLE | Refills: 5 | Status: ON HOLD | OUTPATIENT
Start: 2019-01-01 | End: 2019-01-01

## 2019-01-01 RX ORDER — DOBUTAMINE HYDROCHLORIDE 400 MG/100ML
2.5 INJECTION, SOLUTION INTRAVENOUS CONTINUOUS
Status: DISCONTINUED | OUTPATIENT
Start: 2019-01-01 | End: 2019-01-01

## 2019-01-01 RX ORDER — VANCOMYCIN 1.75 GRAM/500 ML IN 0.9 % SODIUM CHLORIDE INTRAVENOUS
15 ONCE
Status: COMPLETED | OUTPATIENT
Start: 2019-01-01 | End: 2019-01-01

## 2019-01-01 RX ORDER — LORAZEPAM 2 MG/ML
2 INJECTION INTRAMUSCULAR EVERY 10 MIN PRN
Status: DISCONTINUED | OUTPATIENT
Start: 2019-01-01 | End: 2019-01-01 | Stop reason: HOSPADM

## 2019-01-01 RX ORDER — SODIUM POLYSTYRENE SULFONATE 4.1 MEQ/G
POWDER, FOR SUSPENSION ORAL; RECTAL
Refills: 5 | Status: ON HOLD | COMMUNITY
Start: 2019-01-01 | End: 2019-01-01 | Stop reason: HOSPADM

## 2019-01-01 RX ORDER — PREGABALIN 100 MG/1
100 CAPSULE ORAL 3 TIMES DAILY
Status: DISCONTINUED | OUTPATIENT
Start: 2019-01-01 | End: 2019-01-01 | Stop reason: HOSPADM

## 2019-01-01 RX ORDER — GLUCAGON 1 MG
1 KIT INJECTION
Status: CANCELLED | OUTPATIENT
Start: 2019-01-01

## 2019-01-01 RX ORDER — FLUDROCORTISONE ACETATE 0.1 MG/1
100 TABLET ORAL 2 TIMES DAILY
Qty: 60 TABLET | Refills: 5 | Status: ON HOLD | OUTPATIENT
Start: 2019-01-01 | End: 2019-01-01 | Stop reason: SDUPTHER

## 2019-01-01 RX ORDER — GABAPENTIN 300 MG/1
600 CAPSULE ORAL 2 TIMES DAILY
Status: DISCONTINUED | OUTPATIENT
Start: 2019-01-01 | End: 2019-01-01 | Stop reason: HOSPADM

## 2019-01-01 RX ORDER — WARFARIN SODIUM 5 MG/1
5 TABLET ORAL
Status: DISCONTINUED | OUTPATIENT
Start: 2019-01-01 | End: 2019-01-01 | Stop reason: HOSPADM

## 2019-01-01 RX ORDER — BISACODYL 10 MG
10 SUPPOSITORY, RECTAL RECTAL ONCE
Status: COMPLETED | OUTPATIENT
Start: 2019-01-01 | End: 2019-01-01

## 2019-01-01 RX ORDER — LORAZEPAM 0.5 MG/1
0.5 TABLET ORAL EVERY 8 HOURS PRN
Status: DISCONTINUED | OUTPATIENT
Start: 2019-01-01 | End: 2019-01-01

## 2019-01-01 RX ORDER — ALBUMIN HUMAN 250 G/1000ML
25 SOLUTION INTRAVENOUS ONCE
Status: COMPLETED | OUTPATIENT
Start: 2019-01-01 | End: 2019-01-01

## 2019-01-01 RX ORDER — INSULIN ASPART 100 [IU]/ML
0-5 INJECTION, SOLUTION INTRAVENOUS; SUBCUTANEOUS EVERY 6 HOURS PRN
Status: DISCONTINUED | OUTPATIENT
Start: 2019-01-01 | End: 2019-01-01

## 2019-01-01 RX ORDER — LIDOCAINE HYDROCHLORIDE 20 MG/ML
INJECTION, SOLUTION EPIDURAL; INFILTRATION; INTRACAUDAL; PERINEURAL
Status: DISCONTINUED | OUTPATIENT
Start: 2019-01-01 | End: 2019-01-01 | Stop reason: HOSPADM

## 2019-01-01 RX ORDER — PREDNISOLONE ACETATE 10 MG/ML
1 SUSPENSION/ DROPS OPHTHALMIC 3 TIMES DAILY
Status: CANCELLED | OUTPATIENT
Start: 2019-01-01

## 2019-01-01 RX ORDER — SEVELAMER CARBONATE 800 MG/1
1600 TABLET, FILM COATED ORAL
Status: CANCELLED | OUTPATIENT
Start: 2019-01-01

## 2019-01-01 RX ADMIN — SEVELAMER CARBONATE 800 MG: 800 TABLET, FILM COATED ORAL at 05:03

## 2019-01-01 RX ADMIN — DICYCLOMINE HYDROCHLORIDE 20 MG: 20 TABLET ORAL at 12:06

## 2019-01-01 RX ADMIN — PREDNISOLONE ACETATE 1 DROP: 10 SUSPENSION/ DROPS OPHTHALMIC at 02:08

## 2019-01-01 RX ADMIN — MICONAZOLE NITRATE: 20 POWDER TOPICAL at 08:08

## 2019-01-01 RX ADMIN — MIDODRINE HYDROCHLORIDE 10 MG: 5 TABLET ORAL at 03:06

## 2019-01-01 RX ADMIN — LEVETIRACETAM 500 MG: 500 TABLET ORAL at 08:06

## 2019-01-01 RX ADMIN — ATORVASTATIN CALCIUM 80 MG: 20 TABLET, FILM COATED ORAL at 10:07

## 2019-01-01 RX ADMIN — VANCOMYCIN HYDROCHLORIDE 1500 MG: 1.5 INJECTION, POWDER, LYOPHILIZED, FOR SOLUTION INTRAVENOUS at 02:08

## 2019-01-01 RX ADMIN — ETOMIDATE 25 MG: 2 INJECTION INTRAVENOUS at 11:08

## 2019-01-01 RX ADMIN — FLUDROCORTISONE ACETATE 200 MCG: 0.1 TABLET ORAL at 09:07

## 2019-01-01 RX ADMIN — PROPOFOL 25 MCG/KG/MIN: 10 INJECTION, EMULSION INTRAVENOUS at 09:08

## 2019-01-01 RX ADMIN — MIDODRINE HYDROCHLORIDE 5 MG: 5 TABLET ORAL at 01:03

## 2019-01-01 RX ADMIN — SENNOSIDES,DOCUSATE SODIUM 1 TABLET: 8.6; 5 TABLET, FILM COATED ORAL at 10:08

## 2019-01-01 RX ADMIN — FLUDROCORTISONE ACETATE 200 MCG: 0.1 TABLET ORAL at 10:08

## 2019-01-01 RX ADMIN — PREDNISOLONE ACETATE 1 DROP: 10 SUSPENSION/ DROPS OPHTHALMIC at 04:08

## 2019-01-01 RX ADMIN — PANTOPRAZOLE SODIUM 40 MG: 40 TABLET, DELAYED RELEASE ORAL at 01:06

## 2019-01-01 RX ADMIN — DEXMEDETOMIDINE HYDROCHLORIDE 1.4 MCG/KG/HR: 4 INJECTION, SOLUTION INTRAVENOUS at 07:08

## 2019-01-01 RX ADMIN — LEVETIRACETAM 500 MG: 5 INJECTION INTRAVENOUS at 08:08

## 2019-01-01 RX ADMIN — SENNOSIDES,DOCUSATE SODIUM 1 TABLET: 8.6; 5 TABLET, FILM COATED ORAL at 09:07

## 2019-01-01 RX ADMIN — CLOPIDOGREL 75 MG: 75 TABLET, FILM COATED ORAL at 01:06

## 2019-01-01 RX ADMIN — PREDNISOLONE ACETATE 1 DROP: 10 SUSPENSION/ DROPS OPHTHALMIC at 04:06

## 2019-01-01 RX ADMIN — ACETAMINOPHEN 650 MG: 325 TABLET ORAL at 06:07

## 2019-01-01 RX ADMIN — MEROPENEM 2 G: 1 INJECTION, POWDER, FOR SOLUTION INTRAVENOUS at 08:08

## 2019-01-01 RX ADMIN — FAMOTIDINE 20 MG: 20 TABLET, FILM COATED ORAL at 08:08

## 2019-01-01 RX ADMIN — ACETAMINOPHEN 1000 MG: 500 TABLET ORAL at 10:07

## 2019-01-01 RX ADMIN — ETOMIDATE 20 MG: 2 INJECTION INTRAVENOUS at 05:08

## 2019-01-01 RX ADMIN — HYDROCORTISONE SODIUM SUCCINATE 100 MG: 100 INJECTION, POWDER, FOR SOLUTION INTRAMUSCULAR; INTRAVENOUS at 09:08

## 2019-01-01 RX ADMIN — VASOPRESSIN 0.04 UNITS/MIN: 20 INJECTION INTRAVENOUS at 09:08

## 2019-01-01 RX ADMIN — SODIUM CHLORIDE: 0.9 INJECTION, SOLUTION INTRAVENOUS at 03:08

## 2019-01-01 RX ADMIN — MICONAZOLE NITRATE: 20 POWDER TOPICAL at 08:06

## 2019-01-01 RX ADMIN — SENNOSIDES,DOCUSATE SODIUM 1 TABLET: 8.6; 5 TABLET, FILM COATED ORAL at 02:07

## 2019-01-01 RX ADMIN — VASOPRESSIN 0.04 UNITS/MIN: 20 INJECTION INTRAVENOUS at 03:08

## 2019-01-01 RX ADMIN — LEVETIRACETAM 500 MG: 500 TABLET, FILM COATED ORAL at 09:06

## 2019-01-01 RX ADMIN — LORAZEPAM 1 MG: 1 TABLET ORAL at 05:07

## 2019-01-01 RX ADMIN — ACETAMINOPHEN 650 MG: 325 TABLET ORAL at 08:06

## 2019-01-01 RX ADMIN — INSULIN ASPART 3 UNITS: 100 INJECTION, SOLUTION INTRAVENOUS; SUBCUTANEOUS at 11:06

## 2019-01-01 RX ADMIN — ATORVASTATIN CALCIUM 80 MG: 20 TABLET, FILM COATED ORAL at 01:05

## 2019-01-01 RX ADMIN — FLUDROCORTISONE ACETATE 100 MCG: 0.1 TABLET ORAL at 09:06

## 2019-01-01 RX ADMIN — ATORVASTATIN CALCIUM 80 MG: 20 TABLET, FILM COATED ORAL at 09:05

## 2019-01-01 RX ADMIN — NEPHROCAP 1 CAPSULE: 1 CAP ORAL at 08:06

## 2019-01-01 RX ADMIN — FLUDROCORTISONE ACETATE 100 MCG: 0.1 TABLET ORAL at 12:06

## 2019-01-01 RX ADMIN — MIDODRINE HYDROCHLORIDE 10 MG: 5 TABLET ORAL at 02:06

## 2019-01-01 RX ADMIN — POLYETHYLENE GLYCOL 3350 17 G: 17 POWDER, FOR SOLUTION ORAL at 09:08

## 2019-01-01 RX ADMIN — CLOPIDOGREL 75 MG: 75 TABLET, FILM COATED ORAL at 09:06

## 2019-01-01 RX ADMIN — Medication 0.13 MCG/KG/MIN: at 10:08

## 2019-01-01 RX ADMIN — CINACALCET HYDROCHLORIDE 30 MG: 30 TABLET, FILM COATED ORAL at 09:08

## 2019-01-01 RX ADMIN — SODIUM CHLORIDE: 0.9 INJECTION, SOLUTION INTRAVENOUS at 11:08

## 2019-01-01 RX ADMIN — KETOROLAC TROMETHAMINE 1 DROP: 5 SOLUTION/ DROPS OPHTHALMIC at 08:03

## 2019-01-01 RX ADMIN — NOREPINEPHRINE BITARTRATE 0.14 MCG/KG/MIN: 1 INJECTION, SOLUTION, CONCENTRATE INTRAVENOUS at 05:08

## 2019-01-01 RX ADMIN — MIDODRINE HYDROCHLORIDE 5 MG: 5 TABLET ORAL at 12:03

## 2019-01-01 RX ADMIN — ONDANSETRON 8 MG: 8 TABLET, ORALLY DISINTEGRATING ORAL at 08:07

## 2019-01-01 RX ADMIN — HEPARIN SODIUM AND DEXTROSE 13 UNITS/KG/HR: 10000; 5 INJECTION INTRAVENOUS at 06:08

## 2019-01-01 RX ADMIN — FLUDROCORTISONE ACETATE 100 MCG: 0.1 TABLET ORAL at 11:06

## 2019-01-01 RX ADMIN — MICONAZOLE NITRATE: 20 POWDER TOPICAL at 09:08

## 2019-01-01 RX ADMIN — PREDNISOLONE ACETATE 1 DROP: 10 SUSPENSION OPHTHALMIC at 03:06

## 2019-01-01 RX ADMIN — CHLORHEXIDINE GLUCONATE 0.12% ORAL RINSE 15 ML: 1.2 LIQUID ORAL at 09:08

## 2019-01-01 RX ADMIN — CLOPIDOGREL 75 MG: 75 TABLET, FILM COATED ORAL at 09:08

## 2019-01-01 RX ADMIN — ATORVASTATIN CALCIUM 80 MG: 20 TABLET, FILM COATED ORAL at 08:03

## 2019-01-01 RX ADMIN — PREDNISONE 20 MG: 20 TABLET ORAL at 08:07

## 2019-01-01 RX ADMIN — FLUDROCORTISONE ACETATE 100 MCG: 0.1 TABLET ORAL at 08:06

## 2019-01-01 RX ADMIN — SEVELAMER CARBONATE 800 MG: 800 TABLET, FILM COATED ORAL at 08:07

## 2019-01-01 RX ADMIN — INSULIN DETEMIR 2 UNITS: 100 INJECTION, SOLUTION SUBCUTANEOUS at 09:06

## 2019-01-01 RX ADMIN — PROPOFOL 20 MCG/KG/MIN: 10 INJECTION, EMULSION INTRAVENOUS at 10:08

## 2019-01-01 RX ADMIN — Medication 175 MCG/HR: at 09:08

## 2019-01-01 RX ADMIN — LEVETIRACETAM 500 MG: 5 INJECTION INTRAVENOUS at 09:08

## 2019-01-01 RX ADMIN — VASOPRESSIN 0.04 UNITS/MIN: 20 INJECTION INTRAVENOUS at 11:08

## 2019-01-01 RX ADMIN — MICONAZOLE NITRATE: 20 POWDER TOPICAL at 09:06

## 2019-01-01 RX ADMIN — CLOPIDOGREL 75 MG: 75 TABLET, FILM COATED ORAL at 10:08

## 2019-01-01 RX ADMIN — SEVELAMER CARBONATE 800 MG: 800 TABLET, FILM COATED ORAL at 12:07

## 2019-01-01 RX ADMIN — PANTOPRAZOLE SODIUM 40 MG: 40 GRANULE, DELAYED RELEASE ORAL at 09:08

## 2019-01-01 RX ADMIN — VALPROATE SODIUM 250 MG: 100 INJECTION, SOLUTION INTRAVENOUS at 05:08

## 2019-01-01 RX ADMIN — SENNOSIDES AND DOCUSATE SODIUM 1 TABLET: 8.6; 5 TABLET ORAL at 08:06

## 2019-01-01 RX ADMIN — MIDODRINE HYDROCHLORIDE 10 MG: 5 TABLET ORAL at 09:07

## 2019-01-01 RX ADMIN — ACETAMINOPHEN 650 MG: 325 TABLET ORAL at 09:07

## 2019-01-01 RX ADMIN — LEVETIRACETAM 500 MG: 500 TABLET, FILM COATED ORAL at 08:08

## 2019-01-01 RX ADMIN — MIDODRINE HYDROCHLORIDE 10 MG: 5 TABLET ORAL at 08:06

## 2019-01-01 RX ADMIN — ATORVASTATIN CALCIUM 80 MG: 20 TABLET, FILM COATED ORAL at 08:06

## 2019-01-01 RX ADMIN — LORAZEPAM 1 MG: 1 TABLET ORAL at 02:07

## 2019-01-01 RX ADMIN — Medication 0.13 MCG/KG/MIN: at 06:08

## 2019-01-01 RX ADMIN — MIDODRINE HYDROCHLORIDE 10 MG: 5 TABLET ORAL at 09:08

## 2019-01-01 RX ADMIN — SEVELAMER CARBONATE 800 MG: 800 TABLET, FILM COATED ORAL at 10:07

## 2019-01-01 RX ADMIN — ACETAMINOPHEN 650 MG: 325 TABLET ORAL at 10:07

## 2019-01-01 RX ADMIN — SULFAMETHOXAZOLE AND TRIMETHOPRIM 1 TABLET: 800; 160 TABLET ORAL at 08:08

## 2019-01-01 RX ADMIN — PANTOPRAZOLE SODIUM 40 MG: 40 TABLET, DELAYED RELEASE ORAL at 09:06

## 2019-01-01 RX ADMIN — SEVELAMER CARBONATE 800 MG: 800 TABLET, FILM COATED ORAL at 08:03

## 2019-01-01 RX ADMIN — PREDNISOLONE ACETATE 1 DROP: 10 SUSPENSION OPHTHALMIC at 10:06

## 2019-01-01 RX ADMIN — HEPARIN SODIUM AND DEXTROSE 15 UNITS/KG/HR: 10000; 5 INJECTION INTRAVENOUS at 11:08

## 2019-01-01 RX ADMIN — PREDNISONE 20 MG: 20 TABLET ORAL at 11:06

## 2019-01-01 RX ADMIN — SEVELAMER CARBONATE 1600 MG: 800 TABLET, FILM COATED ORAL at 05:06

## 2019-01-01 RX ADMIN — ACETAMINOPHEN 650 MG: 325 TABLET ORAL at 12:07

## 2019-01-01 RX ADMIN — FLUDROCORTISONE ACETATE 100 MCG: 0.1 TABLET ORAL at 08:03

## 2019-01-01 RX ADMIN — VASOPRESSIN 0.04 UNITS/MIN: 20 INJECTION INTRAVENOUS at 07:08

## 2019-01-01 RX ADMIN — PREDNISOLONE ACETATE 1 DROP: 10 SUSPENSION OPHTHALMIC at 02:06

## 2019-01-01 RX ADMIN — Medication 150 MCG/HR: at 08:08

## 2019-01-01 RX ADMIN — ALBUMIN (HUMAN) 25 G: 25 SOLUTION INTRAVENOUS at 02:08

## 2019-01-01 RX ADMIN — LEVETIRACETAM 500 MG: 500 TABLET ORAL at 11:03

## 2019-01-01 RX ADMIN — POLYETHYLENE GLYCOL 3350 17 G: 17 POWDER, FOR SOLUTION ORAL at 08:08

## 2019-01-01 RX ADMIN — GABAPENTIN 600 MG: 300 CAPSULE ORAL at 09:05

## 2019-01-01 RX ADMIN — FLUDROCORTISONE ACETATE 100 MCG: 0.1 TABLET ORAL at 01:06

## 2019-01-01 RX ADMIN — NORTRIPTYLINE HYDROCHLORIDE 25 MG: 25 CAPSULE ORAL at 07:03

## 2019-01-01 RX ADMIN — FLUDROCORTISONE ACETATE 100 MCG: 0.1 TABLET ORAL at 09:08

## 2019-01-01 RX ADMIN — DEXTROSE MONOHYDRATE 25 G: 500 INJECTION PARENTERAL at 03:04

## 2019-01-01 RX ADMIN — PIPERACILLIN AND TAZOBACTAM 4.5 G: 4; .5 INJECTION, POWDER, LYOPHILIZED, FOR SOLUTION INTRAVENOUS; PARENTERAL at 04:08

## 2019-01-01 RX ADMIN — PREDNISOLONE ACETATE 1 DROP: 10 SUSPENSION OPHTHALMIC at 02:07

## 2019-01-01 RX ADMIN — SENNOSIDES,DOCUSATE SODIUM 1 TABLET: 8.6; 5 TABLET, FILM COATED ORAL at 08:08

## 2019-01-01 RX ADMIN — STANDARDIZED SENNA CONCENTRATE AND DOCUSATE SODIUM 1 TABLET: 8.6; 5 TABLET, FILM COATED ORAL at 08:03

## 2019-01-01 RX ADMIN — SEVELAMER CARBONATE 800 MG: 800 TABLET, FILM COATED ORAL at 05:07

## 2019-01-01 RX ADMIN — PARICALCITOL 4.5 MCG: 5 INJECTION, SOLUTION INTRAVENOUS at 11:07

## 2019-01-01 RX ADMIN — MIDODRINE HYDROCHLORIDE 15 MG: 5 TABLET ORAL at 10:03

## 2019-01-01 RX ADMIN — LEVETIRACETAM 500 MG: 500 TABLET, FILM COATED ORAL at 02:06

## 2019-01-01 RX ADMIN — INSULIN ASPART 2 UNITS: 100 INJECTION, SOLUTION INTRAVENOUS; SUBCUTANEOUS at 01:08

## 2019-01-01 RX ADMIN — MIDODRINE HYDROCHLORIDE 10 MG: 5 TABLET ORAL at 08:08

## 2019-01-01 RX ADMIN — INSULIN ASPART 1 UNITS: 100 INJECTION, SOLUTION INTRAVENOUS; SUBCUTANEOUS at 09:08

## 2019-01-01 RX ADMIN — ASPIRIN 81 MG: 81 TABLET, COATED ORAL at 08:06

## 2019-01-01 RX ADMIN — FLUDROCORTISONE ACETATE 200 MCG: 0.1 TABLET ORAL at 01:07

## 2019-01-01 RX ADMIN — SENNOSIDES AND DOCUSATE SODIUM 1 TABLET: 8.6; 5 TABLET ORAL at 09:06

## 2019-01-01 RX ADMIN — PIPERACILLIN AND TAZOBACTAM 4.5 G: 4; .5 INJECTION, POWDER, LYOPHILIZED, FOR SOLUTION INTRAVENOUS; PARENTERAL at 08:06

## 2019-01-01 RX ADMIN — VALPROATE SODIUM 250 MG: 100 INJECTION, SOLUTION INTRAVENOUS at 12:08

## 2019-01-01 RX ADMIN — PREDNISONE 20 MG: 20 TABLET ORAL at 09:06

## 2019-01-01 RX ADMIN — CINACALCET HYDROCHLORIDE 30 MG: 30 TABLET, FILM COATED ORAL at 07:08

## 2019-01-01 RX ADMIN — CHLORHEXIDINE GLUCONATE 0.12% ORAL RINSE 15 ML: 1.2 LIQUID ORAL at 08:08

## 2019-01-01 RX ADMIN — PANTOPRAZOLE SODIUM 40 MG: 40 TABLET, DELAYED RELEASE ORAL at 08:06

## 2019-01-01 RX ADMIN — FENTANYL CITRATE 50 MCG: 50 INJECTION INTRAMUSCULAR; INTRAVENOUS at 09:08

## 2019-01-01 RX ADMIN — WARFARIN SODIUM 7.5 MG: 2.5 TABLET ORAL at 05:06

## 2019-01-01 RX ADMIN — SENNOSIDES,DOCUSATE SODIUM 1 TABLET: 8.6; 5 TABLET, FILM COATED ORAL at 01:07

## 2019-01-01 RX ADMIN — PROMETHAZINE HYDROCHLORIDE 12.5 MG: 12.5 TABLET ORAL at 03:06

## 2019-01-01 RX ADMIN — MIDODRINE HYDROCHLORIDE 15 MG: 5 TABLET ORAL at 08:03

## 2019-01-01 RX ADMIN — PREDNISOLONE ACETATE 1 DROP: 10 SUSPENSION/ DROPS OPHTHALMIC at 08:03

## 2019-01-01 RX ADMIN — INSULIN ASPART 1 UNITS: 100 INJECTION, SOLUTION INTRAVENOUS; SUBCUTANEOUS at 09:06

## 2019-01-01 RX ADMIN — SODIUM PHOSPHATE, MONOBASIC, MONOHYDRATE 39.99 MMOL: 276; 142 INJECTION, SOLUTION INTRAVENOUS at 01:08

## 2019-01-01 RX ADMIN — CINACALCET HYDROCHLORIDE 30 MG: 30 TABLET, FILM COATED ORAL at 09:06

## 2019-01-01 RX ADMIN — PREDNISOLONE ACETATE 1 DROP: 10 SUSPENSION/ DROPS OPHTHALMIC at 02:03

## 2019-01-01 RX ADMIN — INSULIN ASPART 1 UNITS: 100 INJECTION, SOLUTION INTRAVENOUS; SUBCUTANEOUS at 02:08

## 2019-01-01 RX ADMIN — MIDODRINE HYDROCHLORIDE 10 MG: 5 TABLET ORAL at 07:07

## 2019-01-01 RX ADMIN — PREDNISOLONE ACETATE 1 DROP: 10 SUSPENSION/ DROPS OPHTHALMIC at 03:08

## 2019-01-01 RX ADMIN — LEVETIRACETAM 500 MG: 500 TABLET, FILM COATED ORAL at 12:06

## 2019-01-01 RX ADMIN — MIDODRINE HYDROCHLORIDE 10 MG: 5 TABLET ORAL at 04:07

## 2019-01-01 RX ADMIN — EPOETIN ALFA-EPBX 5500 UNITS: 10000 INJECTION, SOLUTION INTRAVENOUS; SUBCUTANEOUS at 11:08

## 2019-01-01 RX ADMIN — INSULIN ASPART 1 UNITS: 100 INJECTION, SOLUTION INTRAVENOUS; SUBCUTANEOUS at 03:08

## 2019-01-01 RX ADMIN — ATORVASTATIN CALCIUM 80 MG: 20 TABLET, FILM COATED ORAL at 09:07

## 2019-01-01 RX ADMIN — FLUDROCORTISONE ACETATE 200 MCG: 0.1 TABLET ORAL at 08:07

## 2019-01-01 RX ADMIN — DEXMEDETOMIDINE HYDROCHLORIDE 1 MCG/KG/HR: 4 INJECTION, SOLUTION INTRAVENOUS at 12:08

## 2019-01-01 RX ADMIN — MICONAZOLE NITRATE: 20 POWDER TOPICAL at 10:06

## 2019-01-01 RX ADMIN — ASPIRIN 81 MG: 81 TABLET, COATED ORAL at 02:06

## 2019-01-01 RX ADMIN — DEXMEDETOMIDINE HYDROCHLORIDE 1.4 MCG/KG/HR: 4 INJECTION, SOLUTION INTRAVENOUS at 03:08

## 2019-01-01 RX ADMIN — PREDNISOLONE ACETATE 1 DROP: 10 SUSPENSION OPHTHALMIC at 09:06

## 2019-01-01 RX ADMIN — INSULIN ASPART 4 UNITS: 100 INJECTION, SOLUTION INTRAVENOUS; SUBCUTANEOUS at 06:07

## 2019-01-01 RX ADMIN — DEXMEDETOMIDINE HYDROCHLORIDE 1.4 MCG/KG/HR: 4 INJECTION, SOLUTION INTRAVENOUS at 05:08

## 2019-01-01 RX ADMIN — MIDODRINE HYDROCHLORIDE 10 MG: 5 TABLET ORAL at 09:06

## 2019-01-01 RX ADMIN — SODIUM CHLORIDE: 0.9 INJECTION, SOLUTION INTRAVENOUS at 06:08

## 2019-01-01 RX ADMIN — INSULIN ASPART 6 UNITS: 100 INJECTION, SOLUTION INTRAVENOUS; SUBCUTANEOUS at 12:07

## 2019-01-01 RX ADMIN — DICYCLOMINE HYDROCHLORIDE 10 MG: 10 CAPSULE ORAL at 04:05

## 2019-01-01 RX ADMIN — SULFAMETHOXAZOLE AND TRIMETHOPRIM 1 TABLET: 800; 160 TABLET ORAL at 09:08

## 2019-01-01 RX ADMIN — SEVELAMER CARBONATE 800 MG: 800 TABLET, FILM COATED ORAL at 05:06

## 2019-01-01 RX ADMIN — SEVELAMER CARBONATE 800 MG: 800 TABLET, FILM COATED ORAL at 01:07

## 2019-01-01 RX ADMIN — MIDODRINE HYDROCHLORIDE 10 MG: 5 TABLET ORAL at 10:08

## 2019-01-01 RX ADMIN — MIDODRINE HYDROCHLORIDE 10 MG: 5 TABLET ORAL at 02:08

## 2019-01-01 RX ADMIN — CINACALCET HYDROCHLORIDE 30 MG: 30 TABLET, FILM COATED ORAL at 12:06

## 2019-01-01 RX ADMIN — PREDNISOLONE ACETATE 1 DROP: 10 SUSPENSION/ DROPS OPHTHALMIC at 09:03

## 2019-01-01 RX ADMIN — NOREPINEPHRINE BITARTRATE 0.22 MCG/KG/MIN: 1 INJECTION, SOLUTION, CONCENTRATE INTRAVENOUS at 05:08

## 2019-01-01 RX ADMIN — PREDNISOLONE ACETATE 1 DROP: 10 SUSPENSION/ DROPS OPHTHALMIC at 08:08

## 2019-01-01 RX ADMIN — FLUDROCORTISONE ACETATE 100 MCG: 0.1 TABLET ORAL at 09:03

## 2019-01-01 RX ADMIN — PREDNISOLONE ACETATE 1 DROP: 10 SUSPENSION/ DROPS OPHTHALMIC at 09:08

## 2019-01-01 RX ADMIN — NORTRIPTYLINE HYDROCHLORIDE 25 MG: 25 CAPSULE ORAL at 08:08

## 2019-01-01 RX ADMIN — MIDODRINE HYDROCHLORIDE 10 MG: 5 TABLET ORAL at 10:07

## 2019-01-01 RX ADMIN — PREDNISONE 10 MG: 10 TABLET ORAL at 08:06

## 2019-01-01 RX ADMIN — ACETAMINOPHEN 650 MG: 325 TABLET ORAL at 03:06

## 2019-01-01 RX ADMIN — MIDODRINE HYDROCHLORIDE 10 MG: 5 TABLET ORAL at 09:03

## 2019-01-01 RX ADMIN — LIDOCAINE HYDROCHLORIDE 1 ML: 10 INJECTION INFILTRATION; PERINEURAL at 03:08

## 2019-01-01 RX ADMIN — HYDROCORTISONE SODIUM SUCCINATE 100 MG: 100 INJECTION, POWDER, FOR SOLUTION INTRAMUSCULAR; INTRAVENOUS at 02:08

## 2019-01-01 RX ADMIN — PREGABALIN 100 MG: 100 CAPSULE ORAL at 11:03

## 2019-01-01 RX ADMIN — KETOROLAC TROMETHAMINE 1 DROP: 5 SOLUTION/ DROPS OPHTHALMIC at 09:03

## 2019-01-01 RX ADMIN — ASPIRIN 81 MG: 81 TABLET, COATED ORAL at 01:06

## 2019-01-01 RX ADMIN — PREDNISOLONE ACETATE 1 DROP: 10 SUSPENSION/ DROPS OPHTHALMIC at 10:03

## 2019-01-01 RX ADMIN — PREDNISOLONE ACETATE 1 DROP: 10 SUSPENSION OPHTHALMIC at 08:06

## 2019-01-01 RX ADMIN — NEPHROCAP 1 CAPSULE: 1 CAP ORAL at 01:06

## 2019-01-01 RX ADMIN — CLOPIDOGREL 75 MG: 75 TABLET, FILM COATED ORAL at 08:06

## 2019-01-01 RX ADMIN — SEVELAMER CARBONATE 800 MG: 800 TABLET, FILM COATED ORAL at 12:06

## 2019-01-01 RX ADMIN — Medication 0.06 MCG/KG/MIN: at 12:08

## 2019-01-01 RX ADMIN — PROPOFOL 15 MCG/KG/MIN: 10 INJECTION, EMULSION INTRAVENOUS at 09:08

## 2019-01-01 RX ADMIN — ASPIRIN 81 MG: 81 TABLET, COATED ORAL at 09:05

## 2019-01-01 RX ADMIN — INSULIN ASPART 2 UNITS: 100 INJECTION, SOLUTION INTRAVENOUS; SUBCUTANEOUS at 12:08

## 2019-01-01 RX ADMIN — MIDODRINE HYDROCHLORIDE 10 MG: 5 TABLET ORAL at 11:06

## 2019-01-01 RX ADMIN — SENNOSIDES,DOCUSATE SODIUM 1 TABLET: 8.6; 5 TABLET, FILM COATED ORAL at 09:08

## 2019-01-01 RX ADMIN — MICONAZOLE NITRATE: 20 POWDER TOPICAL at 10:08

## 2019-01-01 RX ADMIN — PANTOPRAZOLE SODIUM 40 MG: 40 GRANULE, DELAYED RELEASE ORAL at 08:08

## 2019-01-01 RX ADMIN — DEXMEDETOMIDINE HYDROCHLORIDE 0.2 MCG/KG/HR: 4 INJECTION, SOLUTION INTRAVENOUS at 12:08

## 2019-01-01 RX ADMIN — MEROPENEM 2 G: 1 INJECTION, POWDER, FOR SOLUTION INTRAVENOUS at 05:08

## 2019-01-01 RX ADMIN — SODIUM PHOSPHATE, MONOBASIC, MONOHYDRATE 30 MMOL: 276; 142 INJECTION, SOLUTION INTRAVENOUS at 02:08

## 2019-01-01 RX ADMIN — PREDNISONE 20 MG: 10 TABLET ORAL at 01:06

## 2019-01-01 RX ADMIN — PREDNISOLONE ACETATE 1 DROP: 10 SUSPENSION/ DROPS OPHTHALMIC at 11:05

## 2019-01-01 RX ADMIN — LORAZEPAM 1 MG: 1 TABLET ORAL at 12:07

## 2019-01-01 RX ADMIN — HYDROCORTISONE SODIUM SUCCINATE 100 MG: 100 INJECTION, POWDER, FOR SOLUTION INTRAMUSCULAR; INTRAVENOUS at 05:08

## 2019-01-01 RX ADMIN — LEVETIRACETAM 500 MG: 500 TABLET, FILM COATED ORAL at 09:07

## 2019-01-01 RX ADMIN — VALPROATE SODIUM 250 MG: 100 INJECTION, SOLUTION INTRAVENOUS at 01:08

## 2019-01-01 RX ADMIN — PROMETHAZINE HYDROCHLORIDE 12.5 MG: 12.5 TABLET ORAL at 09:06

## 2019-01-01 RX ADMIN — WARFARIN SODIUM 5 MG: 5 TABLET ORAL at 11:07

## 2019-01-01 RX ADMIN — LEVETIRACETAM 500 MG: 500 TABLET, FILM COATED ORAL at 08:06

## 2019-01-01 RX ADMIN — BENZONATATE 200 MG: 100 CAPSULE ORAL at 07:03

## 2019-01-01 RX ADMIN — PREDNISOLONE ACETATE 1 DROP: 10 SUSPENSION/ DROPS OPHTHALMIC at 10:06

## 2019-01-01 RX ADMIN — EPOETIN ALFA-EPBX 5400 UNITS: 10000 INJECTION, SOLUTION INTRAVENOUS; SUBCUTANEOUS at 10:07

## 2019-01-01 RX ADMIN — CINACALCET HYDROCHLORIDE 30 MG: 30 TABLET, FILM COATED ORAL at 08:08

## 2019-01-01 RX ADMIN — CEFAZOLIN 2 G: 330 INJECTION, POWDER, FOR SOLUTION INTRAMUSCULAR; INTRAVENOUS at 10:05

## 2019-01-01 RX ADMIN — MIDODRINE HYDROCHLORIDE 10 MG: 5 TABLET ORAL at 08:05

## 2019-01-01 RX ADMIN — SEVELAMER CARBONATE 800 MG: 800 TABLET, FILM COATED ORAL at 09:07

## 2019-01-01 RX ADMIN — SODIUM CHLORIDE 300 ML: 0.9 INJECTION, SOLUTION INTRAVENOUS at 06:07

## 2019-01-01 RX ADMIN — LORAZEPAM 1 MG: 1 TABLET ORAL at 09:07

## 2019-01-01 RX ADMIN — CINACALCET HYDROCHLORIDE 30 MG: 30 TABLET, FILM COATED ORAL at 08:06

## 2019-01-01 RX ADMIN — MIDAZOLAM HYDROCHLORIDE 2 MG: 1 INJECTION, SOLUTION INTRAMUSCULAR; INTRAVENOUS at 11:08

## 2019-01-01 RX ADMIN — ASPIRIN 81 MG: 81 TABLET, COATED ORAL at 09:06

## 2019-01-01 RX ADMIN — MEROPENEM AND SODIUM CHLORIDE 1 G: 1 INJECTION, SOLUTION INTRAVENOUS at 01:08

## 2019-01-01 RX ADMIN — LEVETIRACETAM 500 MG: 500 TABLET, FILM COATED ORAL at 09:08

## 2019-01-01 RX ADMIN — VASOPRESSIN 0.04 UNITS/MIN: 20 INJECTION INTRAVENOUS at 12:08

## 2019-01-01 RX ADMIN — FLUDROCORTISONE ACETATE 100 MCG: 0.1 TABLET ORAL at 08:08

## 2019-01-01 RX ADMIN — DEXMEDETOMIDINE HYDROCHLORIDE 1 MCG/KG/HR: 4 INJECTION, SOLUTION INTRAVENOUS at 11:08

## 2019-01-01 RX ADMIN — INSULIN ASPART 2 UNITS: 100 INJECTION, SOLUTION INTRAVENOUS; SUBCUTANEOUS at 05:06

## 2019-01-01 RX ADMIN — SODIUM PHOSPHATE, MONOBASIC, MONOHYDRATE 39.99 MMOL: 276; 142 INJECTION, SOLUTION INTRAVENOUS at 05:08

## 2019-01-01 RX ADMIN — MIDODRINE HYDROCHLORIDE 10 MG: 5 TABLET ORAL at 10:06

## 2019-01-01 RX ADMIN — SODIUM CHLORIDE: 0.9 INJECTION, SOLUTION INTRAVENOUS at 10:08

## 2019-01-01 RX ADMIN — PREDNISONE 20 MG: 10 TABLET ORAL at 09:06

## 2019-01-01 RX ADMIN — PREDNISOLONE ACETATE 1 DROP: 10 SUSPENSION/ DROPS OPHTHALMIC at 09:05

## 2019-01-01 RX ADMIN — INSULIN ASPART 1 UNITS: 100 INJECTION, SOLUTION INTRAVENOUS; SUBCUTANEOUS at 11:08

## 2019-01-01 RX ADMIN — PROPOFOL 25 MCG/KG/MIN: 10 INJECTION, EMULSION INTRAVENOUS at 02:08

## 2019-01-01 RX ADMIN — ACETAMINOPHEN 650 MG: 325 TABLET ORAL at 09:06

## 2019-01-01 RX ADMIN — ACETAMINOPHEN 650 MG: 325 TABLET ORAL at 02:07

## 2019-01-01 RX ADMIN — PREDNISOLONE ACETATE 1 DROP: 10 SUSPENSION/ DROPS OPHTHALMIC at 05:08

## 2019-01-01 RX ADMIN — PREDNISONE 20 MG: 20 TABLET ORAL at 09:07

## 2019-01-01 RX ADMIN — Medication 0.04 MCG/KG/MIN: at 06:08

## 2019-01-01 RX ADMIN — IPRATROPIUM BROMIDE AND ALBUTEROL SULFATE 3 ML: .5; 3 SOLUTION RESPIRATORY (INHALATION) at 07:05

## 2019-01-01 RX ADMIN — PREDNISONE 20 MG: 20 TABLET ORAL at 09:03

## 2019-01-01 RX ADMIN — MEROPENEM 2 G: 1 INJECTION, POWDER, FOR SOLUTION INTRAVENOUS at 10:08

## 2019-01-01 RX ADMIN — Medication: at 03:08

## 2019-01-01 RX ADMIN — DEXMEDETOMIDINE HYDROCHLORIDE 1.4 MCG/KG/HR: 4 INJECTION, SOLUTION INTRAVENOUS at 09:08

## 2019-01-01 RX ADMIN — LEVETIRACETAM 500 MG: 500 TABLET, FILM COATED ORAL at 02:03

## 2019-01-01 RX ADMIN — INSULIN ASPART 2 UNITS: 100 INJECTION, SOLUTION INTRAVENOUS; SUBCUTANEOUS at 08:08

## 2019-01-01 RX ADMIN — SEVELAMER CARBONATE 800 MG: 800 TABLET, FILM COATED ORAL at 09:05

## 2019-01-01 RX ADMIN — WARFARIN SODIUM 2.5 MG: 2.5 TABLET ORAL at 06:07

## 2019-01-01 RX ADMIN — FLUDROCORTISONE ACETATE 200 MCG: 0.1 TABLET ORAL at 10:07

## 2019-01-01 RX ADMIN — MIDODRINE HYDROCHLORIDE 10 MG: 5 TABLET ORAL at 03:07

## 2019-01-01 RX ADMIN — LEVETIRACETAM 500 MG: 500 TABLET, FILM COATED ORAL at 03:06

## 2019-01-01 RX ADMIN — LEVETIRACETAM 500 MG: 5 INJECTION INTRAVENOUS at 08:05

## 2019-01-01 RX ADMIN — HYDROCORTISONE SODIUM SUCCINATE 50 MG: 100 INJECTION, POWDER, FOR SOLUTION INTRAMUSCULAR; INTRAVENOUS at 05:08

## 2019-01-01 RX ADMIN — OXYCODONE HYDROCHLORIDE 5 MG: 5 TABLET ORAL at 11:07

## 2019-01-01 RX ADMIN — VALPROATE SODIUM 250 MG: 100 INJECTION, SOLUTION INTRAVENOUS at 08:08

## 2019-01-01 RX ADMIN — DEXMEDETOMIDINE HYDROCHLORIDE 0.5 MCG/KG/HR: 4 INJECTION, SOLUTION INTRAVENOUS at 11:08

## 2019-01-01 RX ADMIN — CINACALCET HYDROCHLORIDE 30 MG: 30 TABLET, FILM COATED ORAL at 01:06

## 2019-01-01 RX ADMIN — Medication 0.04 MCG/KG/MIN: at 11:08

## 2019-01-01 RX ADMIN — PREDNISOLONE ACETATE 1 DROP: 10 SUSPENSION/ DROPS OPHTHALMIC at 02:06

## 2019-01-01 RX ADMIN — SEVELAMER CARBONATE 800 MG: 800 TABLET, FILM COATED ORAL at 08:06

## 2019-01-01 RX ADMIN — INSULIN ASPART 2 UNITS: 100 INJECTION, SOLUTION INTRAVENOUS; SUBCUTANEOUS at 12:06

## 2019-01-01 RX ADMIN — SEVELAMER CARBONATE 800 MG: 800 TABLET, FILM COATED ORAL at 12:04

## 2019-01-01 RX ADMIN — LEVETIRACETAM 500 MG: 500 TABLET, FILM COATED ORAL at 12:07

## 2019-01-01 RX ADMIN — MIDODRINE HYDROCHLORIDE 10 MG: 5 TABLET ORAL at 02:07

## 2019-01-01 RX ADMIN — SODIUM CHLORIDE: 0.9 INJECTION, SOLUTION INTRAVENOUS at 02:08

## 2019-01-01 RX ADMIN — Medication 100 MCG/HR: at 12:08

## 2019-01-01 RX ADMIN — SODIUM CHLORIDE 400 ML: 0.9 INJECTION, SOLUTION INTRAVENOUS at 02:07

## 2019-01-01 RX ADMIN — PREGABALIN 100 MG: 50 CAPSULE ORAL at 09:05

## 2019-01-01 RX ADMIN — PREDNISOLONE ACETATE 1 DROP: 10 SUSPENSION/ DROPS OPHTHALMIC at 09:06

## 2019-01-01 RX ADMIN — RAMELTEON 8 MG: 8 TABLET, FILM COATED ORAL at 09:06

## 2019-01-01 RX ADMIN — WARFARIN SODIUM 5 MG: 5 TABLET ORAL at 05:06

## 2019-01-01 RX ADMIN — CLOPIDOGREL 75 MG: 75 TABLET, FILM COATED ORAL at 02:06

## 2019-01-01 RX ADMIN — PREDNISOLONE ACETATE 1 DROP: 10 SUSPENSION/ DROPS OPHTHALMIC at 08:06

## 2019-01-01 RX ADMIN — SEVELAMER CARBONATE 800 MG: 800 TABLET, FILM COATED ORAL at 07:07

## 2019-01-01 RX ADMIN — PROPOFOL 20 MCG/KG/MIN: 10 INJECTION, EMULSION INTRAVENOUS at 11:08

## 2019-01-01 RX ADMIN — INSULIN ASPART 2 UNITS: 100 INJECTION, SOLUTION INTRAVENOUS; SUBCUTANEOUS at 07:08

## 2019-01-01 RX ADMIN — NEPHROCAP 1 CAPSULE: 1 CAP ORAL at 09:05

## 2019-01-01 RX ADMIN — Medication: at 11:08

## 2019-01-01 RX ADMIN — CLOPIDOGREL 75 MG: 75 TABLET, FILM COATED ORAL at 12:06

## 2019-01-01 RX ADMIN — DEXMEDETOMIDINE HYDROCHLORIDE 1 MCG/KG/HR: 4 INJECTION, SOLUTION INTRAVENOUS at 02:08

## 2019-01-01 RX ADMIN — INSULIN ASPART 2 UNITS: 100 INJECTION, SOLUTION INTRAVENOUS; SUBCUTANEOUS at 06:08

## 2019-01-01 RX ADMIN — HEPARIN SODIUM 5000 UNITS: 5000 INJECTION, SOLUTION INTRAVENOUS; SUBCUTANEOUS at 05:05

## 2019-01-01 RX ADMIN — PIPERACILLIN AND TAZOBACTAM 4.5 G: 4; .5 INJECTION, POWDER, LYOPHILIZED, FOR SOLUTION INTRAVENOUS; PARENTERAL at 02:08

## 2019-01-01 RX ADMIN — INSULIN HUMAN 10 UNITS: 100 INJECTION, SOLUTION PARENTERAL at 03:04

## 2019-01-01 RX ADMIN — WARFARIN SODIUM 7.5 MG: 7.5 TABLET ORAL at 06:03

## 2019-01-01 RX ADMIN — HEPARIN SODIUM 5000 UNITS: 5000 INJECTION, SOLUTION INTRAVENOUS; SUBCUTANEOUS at 09:07

## 2019-01-01 RX ADMIN — ATORVASTATIN CALCIUM 80 MG: 20 TABLET, FILM COATED ORAL at 09:03

## 2019-01-01 RX ADMIN — INSULIN ASPART 1 UNITS: 100 INJECTION, SOLUTION INTRAVENOUS; SUBCUTANEOUS at 04:08

## 2019-01-01 RX ADMIN — SULFAMETHOXAZOLE AND TRIMETHOPRIM 1 TABLET: 800; 160 TABLET ORAL at 01:08

## 2019-01-01 RX ADMIN — PREGABALIN 100 MG: 100 CAPSULE ORAL at 03:03

## 2019-01-01 RX ADMIN — PIPERACILLIN AND TAZOBACTAM 4.5 G: 4; .5 INJECTION, POWDER, LYOPHILIZED, FOR SOLUTION INTRAVENOUS; PARENTERAL at 08:05

## 2019-01-01 RX ADMIN — SODIUM CHLORIDE: 0.9 INJECTION, SOLUTION INTRAVENOUS at 09:08

## 2019-01-01 RX ADMIN — RAMELTEON 8 MG: 8 TABLET, FILM COATED ORAL at 10:06

## 2019-01-01 RX ADMIN — MAGNESIUM SULFATE IN WATER 2 G: 40 INJECTION, SOLUTION INTRAVENOUS at 02:08

## 2019-01-01 RX ADMIN — CALCIUM GLUCONATE 1 G: 98 INJECTION, SOLUTION INTRAVENOUS at 12:03

## 2019-01-01 RX ADMIN — HYDROCORTISONE SODIUM SUCCINATE 100 MG: 100 INJECTION, POWDER, FOR SOLUTION INTRAMUSCULAR; INTRAVENOUS at 06:08

## 2019-01-01 RX ADMIN — ASPIRIN 81 MG: 81 TABLET, COATED ORAL at 09:03

## 2019-01-01 RX ADMIN — ALTEPLASE 2 MG: 2.2 INJECTION, POWDER, LYOPHILIZED, FOR SOLUTION INTRAVENOUS at 03:08

## 2019-01-01 RX ADMIN — HEPARIN SODIUM AND DEXTROSE 13 UNITS/KG/HR: 10000; 5 INJECTION INTRAVENOUS at 03:08

## 2019-01-01 RX ADMIN — SODIUM CHLORIDE 2 UNITS/HR: 9 INJECTION, SOLUTION INTRAVENOUS at 08:08

## 2019-01-01 RX ADMIN — CLOPIDOGREL 75 MG: 75 TABLET, FILM COATED ORAL at 08:03

## 2019-01-01 RX ADMIN — PREDNISONE 20 MG: 20 TABLET ORAL at 08:06

## 2019-01-01 RX ADMIN — LEVETIRACETAM 500 MG: 500 TABLET ORAL at 09:05

## 2019-01-01 RX ADMIN — ASPIRIN 81 MG: 81 TABLET, COATED ORAL at 08:03

## 2019-01-01 RX ADMIN — POLYETHYLENE GLYCOL 3350 17 G: 17 POWDER, FOR SOLUTION ORAL at 08:03

## 2019-01-01 RX ADMIN — CLOPIDOGREL 75 MG: 75 TABLET, FILM COATED ORAL at 09:03

## 2019-01-01 RX ADMIN — MIDODRINE HYDROCHLORIDE 10 MG: 5 TABLET ORAL at 09:04

## 2019-01-01 RX ADMIN — SODIUM CHLORIDE 350 ML: 0.9 INJECTION, SOLUTION INTRAVENOUS at 10:06

## 2019-01-01 RX ADMIN — NEPHROCAP 1 CAPSULE: 1 CAP ORAL at 09:06

## 2019-01-01 RX ADMIN — VASOPRESSIN 0.04 UNITS/MIN: 20 INJECTION INTRAVENOUS at 02:08

## 2019-01-01 RX ADMIN — LEVETIRACETAM 500 MG: 500 TABLET, FILM COATED ORAL at 01:07

## 2019-01-01 RX ADMIN — MAGNESIUM SULFATE IN WATER 2 G: 40 INJECTION, SOLUTION INTRAVENOUS at 05:08

## 2019-01-01 RX ADMIN — WARFARIN SODIUM 5 MG: 5 TABLET ORAL at 06:03

## 2019-01-01 RX ADMIN — CEFTRIAXONE SODIUM 2 G: 2 INJECTION, SOLUTION INTRAVENOUS at 06:06

## 2019-01-01 RX ADMIN — SODIUM CHLORIDE 2 UNITS/HR: 9 INJECTION, SOLUTION INTRAVENOUS at 11:08

## 2019-01-01 RX ADMIN — HEPARIN SODIUM AND DEXTROSE 9 UNITS/KG/HR: 10000; 5 INJECTION INTRAVENOUS at 10:08

## 2019-01-01 RX ADMIN — Medication 16 G: at 01:06

## 2019-01-01 RX ADMIN — ACETAMINOPHEN 650 MG: 325 TABLET ORAL at 11:07

## 2019-01-01 RX ADMIN — SENNOSIDES,DOCUSATE SODIUM 1 TABLET: 8.6; 5 TABLET, FILM COATED ORAL at 08:07

## 2019-01-01 RX ADMIN — SEVELAMER CARBONATE 1600 MG: 800 TABLET, FILM COATED ORAL at 12:06

## 2019-01-01 RX ADMIN — CINACALCET HYDROCHLORIDE 30 MG: 30 TABLET, FILM COATED ORAL at 08:07

## 2019-01-01 RX ADMIN — PANTOPRAZOLE SODIUM 40 MG: 40 TABLET, DELAYED RELEASE ORAL at 09:03

## 2019-01-01 RX ADMIN — MEROPENEM AND SODIUM CHLORIDE 1 G: 1 INJECTION, SOLUTION INTRAVENOUS at 09:08

## 2019-01-01 RX ADMIN — SODIUM CHLORIDE 400 ML: 0.9 INJECTION, SOLUTION INTRAVENOUS at 09:03

## 2019-01-01 RX ADMIN — PROPOFOL 5 MCG/KG/MIN: 10 INJECTION, EMULSION INTRAVENOUS at 11:08

## 2019-01-01 RX ADMIN — PREDNISOLONE ACETATE 1 DROP: 10 SUSPENSION/ DROPS OPHTHALMIC at 03:06

## 2019-01-01 RX ADMIN — SEVELAMER CARBONATE 800 MG: 800 TABLET, FILM COATED ORAL at 09:04

## 2019-01-01 RX ADMIN — SENNOSIDES,DOCUSATE SODIUM 1 TABLET: 8.6; 5 TABLET, FILM COATED ORAL at 08:05

## 2019-01-01 RX ADMIN — PREDNISONE 10 MG: 10 TABLET ORAL at 05:06

## 2019-01-01 RX ADMIN — SODIUM CHLORIDE: 0.9 INJECTION, SOLUTION INTRAVENOUS at 12:08

## 2019-01-01 RX ADMIN — Medication: at 07:08

## 2019-01-01 RX ADMIN — WARFARIN SODIUM 5 MG: 5 TABLET ORAL at 05:03

## 2019-01-01 RX ADMIN — PREGABALIN 100 MG: 50 CAPSULE ORAL at 08:03

## 2019-01-01 RX ADMIN — IOHEXOL 100 ML: 350 INJECTION, SOLUTION INTRAVENOUS at 01:03

## 2019-01-01 RX ADMIN — INSULIN ASPART 2 UNITS: 100 INJECTION, SOLUTION INTRAVENOUS; SUBCUTANEOUS at 05:08

## 2019-01-01 RX ADMIN — MIDODRINE HYDROCHLORIDE 10 MG: 5 TABLET ORAL at 08:07

## 2019-01-01 RX ADMIN — INSULIN ASPART 4 UNITS: 100 INJECTION, SOLUTION INTRAVENOUS; SUBCUTANEOUS at 05:07

## 2019-01-01 RX ADMIN — INSULIN ASPART 1 UNITS: 100 INJECTION, SOLUTION INTRAVENOUS; SUBCUTANEOUS at 08:06

## 2019-01-01 RX ADMIN — ONDANSETRON 8 MG: 8 TABLET, ORALLY DISINTEGRATING ORAL at 07:07

## 2019-01-01 RX ADMIN — WARFARIN SODIUM 2.5 MG: 2.5 TABLET ORAL at 06:08

## 2019-01-01 RX ADMIN — DEXMEDETOMIDINE HYDROCHLORIDE 1.4 MCG/KG/HR: 4 INJECTION, SOLUTION INTRAVENOUS at 12:08

## 2019-01-01 RX ADMIN — RAMELTEON 8 MG: 8 TABLET, FILM COATED ORAL at 08:06

## 2019-01-01 RX ADMIN — MEROPENEM 2 G: 1 INJECTION, POWDER, FOR SOLUTION INTRAVENOUS at 03:08

## 2019-01-01 RX ADMIN — INSULIN ASPART 3 UNITS: 100 INJECTION, SOLUTION INTRAVENOUS; SUBCUTANEOUS at 06:06

## 2019-01-01 RX ADMIN — NOREPINEPHRINE BITARTRATE 0.04 MCG/KG/MIN: 1 INJECTION, SOLUTION, CONCENTRATE INTRAVENOUS at 12:08

## 2019-01-01 RX ADMIN — DICYCLOMINE HYDROCHLORIDE 20 MG: 20 TABLET ORAL at 09:06

## 2019-01-01 RX ADMIN — PREDNISONE 20 MG: 20 TABLET ORAL at 09:04

## 2019-01-01 RX ADMIN — FLUDROCORTISONE ACETATE 100 MCG: 0.1 TABLET ORAL at 02:06

## 2019-01-01 RX ADMIN — MEROPENEM 2 G: 1 INJECTION, POWDER, FOR SOLUTION INTRAVENOUS at 07:08

## 2019-01-01 RX ADMIN — ACETAMINOPHEN 650 MG: 325 TABLET ORAL at 05:07

## 2019-01-01 RX ADMIN — SEVELAMER CARBONATE 800 MG: 800 TABLET, FILM COATED ORAL at 01:03

## 2019-01-01 RX ADMIN — PREDNISONE 10 MG: 10 TABLET ORAL at 12:06

## 2019-01-01 RX ADMIN — BACITRACIN ZINC AND POLYMYXIN B SULFATE: 500; 10000 OINTMENT TOPICAL at 08:03

## 2019-01-01 RX ADMIN — WARFARIN SODIUM 7.5 MG: 7.5 TABLET ORAL at 07:03

## 2019-01-01 RX ADMIN — PREDNISOLONE ACETATE 1 DROP: 10 SUSPENSION/ DROPS OPHTHALMIC at 01:05

## 2019-01-01 RX ADMIN — ASPIRIN 81 MG: 81 TABLET, COATED ORAL at 08:07

## 2019-01-01 RX ADMIN — LEVETIRACETAM 500 MG: 500 TABLET ORAL at 12:06

## 2019-01-01 RX ADMIN — CALCIUM GLUCONATE 1000 MG: 98 INJECTION, SOLUTION INTRAVENOUS at 08:03

## 2019-01-01 RX ADMIN — PROPOFOL 20 MCG/KG/MIN: 10 INJECTION, EMULSION INTRAVENOUS at 07:08

## 2019-01-01 RX ADMIN — VANCOMYCIN HYDROCHLORIDE 2000 MG: 100 INJECTION, POWDER, LYOPHILIZED, FOR SOLUTION INTRAVENOUS at 03:03

## 2019-01-01 RX ADMIN — PREDNISONE 20 MG: 20 TABLET ORAL at 07:05

## 2019-01-01 RX ADMIN — INSULIN ASPART 2 UNITS: 100 INJECTION, SOLUTION INTRAVENOUS; SUBCUTANEOUS at 04:08

## 2019-01-01 RX ADMIN — MIDODRINE HYDROCHLORIDE 10 MG: 5 TABLET ORAL at 03:08

## 2019-01-01 RX ADMIN — LEVETIRACETAM 500 MG: 500 TABLET, FILM COATED ORAL at 10:03

## 2019-01-01 RX ADMIN — SENNOSIDES AND DOCUSATE SODIUM 1 TABLET: 8.6; 5 TABLET ORAL at 02:06

## 2019-01-01 RX ADMIN — LEVETIRACETAM 500 MG: 500 TABLET, FILM COATED ORAL at 07:07

## 2019-01-01 RX ADMIN — DEXMEDETOMIDINE HYDROCHLORIDE 1.2 MCG/KG/HR: 4 INJECTION, SOLUTION INTRAVENOUS at 08:08

## 2019-01-01 RX ADMIN — NEPHROCAP 1 CAPSULE: 1 CAP ORAL at 03:06

## 2019-01-01 RX ADMIN — EPOETIN ALFA-EPBX 5500 UNITS: 10000 INJECTION, SOLUTION INTRAVENOUS; SUBCUTANEOUS at 12:08

## 2019-01-01 RX ADMIN — HEPARIN SODIUM 5000 UNITS: 5000 INJECTION, SOLUTION INTRAVENOUS; SUBCUTANEOUS at 12:05

## 2019-01-01 RX ADMIN — LORAZEPAM 1 MG: 2 INJECTION INTRAMUSCULAR; INTRAVENOUS at 02:08

## 2019-01-01 RX ADMIN — PIPERACILLIN AND TAZOBACTAM 4.5 G: 4; .5 INJECTION, POWDER, LYOPHILIZED, FOR SOLUTION INTRAVENOUS; PARENTERAL at 07:05

## 2019-01-01 RX ADMIN — HYDROCORTISONE SODIUM SUCCINATE 50 MG: 100 INJECTION, POWDER, FOR SOLUTION INTRAMUSCULAR; INTRAVENOUS at 02:08

## 2019-01-01 RX ADMIN — CEFTRIAXONE SODIUM 2 G: 2 INJECTION, POWDER, FOR SOLUTION INTRAMUSCULAR; INTRAVENOUS at 07:06

## 2019-01-01 RX ADMIN — MICONAZOLE NITRATE: 20 POWDER TOPICAL at 01:06

## 2019-01-01 RX ADMIN — DEXTROSE MONOHYDRATE 25 G: 500 INJECTION PARENTERAL at 06:05

## 2019-01-01 RX ADMIN — PANTOPRAZOLE SODIUM 40 MG: 40 TABLET, DELAYED RELEASE ORAL at 12:06

## 2019-01-01 RX ADMIN — BENZONATATE 200 MG: 100 CAPSULE ORAL at 03:03

## 2019-01-01 RX ADMIN — INSULIN ASPART 3 UNITS: 100 INJECTION, SOLUTION INTRAVENOUS; SUBCUTANEOUS at 08:08

## 2019-01-01 RX ADMIN — FLUDROCORTISONE ACETATE 100 MCG: 0.1 TABLET ORAL at 10:06

## 2019-01-01 RX ADMIN — VASOPRESSIN 0.04 UNITS/MIN: 20 INJECTION INTRAVENOUS at 10:08

## 2019-01-01 RX ADMIN — INSULIN ASPART 2 UNITS: 100 INJECTION, SOLUTION INTRAVENOUS; SUBCUTANEOUS at 10:07

## 2019-01-01 RX ADMIN — SODIUM CHLORIDE: 0.9 INJECTION, SOLUTION INTRAVENOUS at 01:08

## 2019-01-01 RX ADMIN — IRON SUCROSE 50 MG: 20 INJECTION, SOLUTION INTRAVENOUS at 03:07

## 2019-01-01 RX ADMIN — FLUDROCORTISONE ACETATE 100 MCG: 0.1 TABLET ORAL at 09:05

## 2019-01-01 RX ADMIN — DEXMEDETOMIDINE HYDROCHLORIDE 0.2 MCG/KG/HR: 4 INJECTION, SOLUTION INTRAVENOUS at 03:08

## 2019-01-01 RX ADMIN — LORAZEPAM 1 MG: 2 INJECTION INTRAMUSCULAR; INTRAVENOUS at 01:08

## 2019-01-01 RX ADMIN — STANDARDIZED SENNA CONCENTRATE AND DOCUSATE SODIUM 1 TABLET: 8.6; 5 TABLET, FILM COATED ORAL at 09:03

## 2019-01-01 RX ADMIN — HEPARIN SODIUM 5000 UNITS: 5000 INJECTION, SOLUTION INTRAVENOUS; SUBCUTANEOUS at 05:07

## 2019-01-01 RX ADMIN — SEVELAMER CARBONATE 800 MG: 800 TABLET, FILM COATED ORAL at 12:03

## 2019-01-01 RX ADMIN — ALBUTEROL SULFATE 10 MG: 2.5 SOLUTION RESPIRATORY (INHALATION) at 02:04

## 2019-01-01 RX ADMIN — NORTRIPTYLINE HYDROCHLORIDE 25 MG: 25 CAPSULE ORAL at 09:05

## 2019-01-01 RX ADMIN — INSULIN ASPART 2 UNITS: 100 INJECTION, SOLUTION INTRAVENOUS; SUBCUTANEOUS at 08:07

## 2019-01-01 RX ADMIN — Medication 0.14 MCG/KG/MIN: at 02:08

## 2019-01-01 RX ADMIN — WARFARIN SODIUM 5 MG: 2.5 TABLET ORAL at 06:07

## 2019-01-01 RX ADMIN — SEVELAMER CARBONATE 800 MG: 800 TABLET, FILM COATED ORAL at 06:03

## 2019-01-01 RX ADMIN — LEVETIRACETAM 500 MG: 500 TABLET, FILM COATED ORAL at 08:07

## 2019-01-01 RX ADMIN — PREGABALIN 100 MG: 100 CAPSULE ORAL at 09:03

## 2019-01-01 RX ADMIN — VASOPRESSIN 0.04 UNITS/MIN: 20 INJECTION INTRAVENOUS at 08:08

## 2019-01-01 RX ADMIN — INSULIN ASPART 2 UNITS: 100 INJECTION, SOLUTION INTRAVENOUS; SUBCUTANEOUS at 09:08

## 2019-01-01 RX ADMIN — SEVELAMER CARBONATE 800 MG: 800 TABLET, FILM COATED ORAL at 11:06

## 2019-01-01 RX ADMIN — WARFARIN SODIUM 5 MG: 2.5 TABLET ORAL at 05:06

## 2019-01-01 RX ADMIN — SEVELAMER CARBONATE 1600 MG: 800 TABLET, FILM COATED ORAL at 08:06

## 2019-01-01 RX ADMIN — PIPERACILLIN AND TAZOBACTAM 4.5 G: 4; .5 INJECTION, POWDER, LYOPHILIZED, FOR SOLUTION INTRAVENOUS; PARENTERAL at 08:08

## 2019-01-01 RX ADMIN — DEXTROSE MONOHYDRATE 25 G: 25 INJECTION, SOLUTION INTRAVENOUS at 12:08

## 2019-01-01 RX ADMIN — PREDNISONE 20 MG: 20 TABLET ORAL at 08:03

## 2019-01-01 RX ADMIN — PREGABALIN 100 MG: 50 CAPSULE ORAL at 03:03

## 2019-01-01 RX ADMIN — ACETAMINOPHEN 650 MG: 325 TABLET ORAL at 06:06

## 2019-01-01 RX ADMIN — MEROPENEM 2 G: 1 INJECTION, POWDER, FOR SOLUTION INTRAVENOUS at 09:08

## 2019-01-01 RX ADMIN — WARFARIN SODIUM 2.5 MG: 2.5 TABLET ORAL at 05:06

## 2019-01-01 RX ADMIN — INSULIN ASPART 4 UNITS: 100 INJECTION, SOLUTION INTRAVENOUS; SUBCUTANEOUS at 11:07

## 2019-01-01 RX ADMIN — SODIUM CHLORIDE: 0.9 INJECTION, SOLUTION INTRAVENOUS at 08:08

## 2019-01-01 RX ADMIN — Medication 0.19 MCG/KG/MIN: at 01:08

## 2019-01-01 RX ADMIN — SEVELAMER CARBONATE 800 MG: 800 TABLET, FILM COATED ORAL at 09:06

## 2019-01-01 RX ADMIN — SODIUM CHLORIDE 6 UNITS/HR: 9 INJECTION, SOLUTION INTRAVENOUS at 05:08

## 2019-01-01 RX ADMIN — LEVETIRACETAM 500 MG: 500 TABLET ORAL at 09:04

## 2019-01-01 RX ADMIN — MIDODRINE HYDROCHLORIDE 10 MG: 5 TABLET ORAL at 12:06

## 2019-01-01 RX ADMIN — PANTOPRAZOLE SODIUM 40 MG: 40 TABLET, DELAYED RELEASE ORAL at 08:03

## 2019-01-01 RX ADMIN — FLUDROCORTISONE ACETATE 200 MCG: 0.1 TABLET ORAL at 08:08

## 2019-01-01 RX ADMIN — EPOETIN ALFA-EPBX 5400 UNITS: 10000 INJECTION, SOLUTION INTRAVENOUS; SUBCUTANEOUS at 11:07

## 2019-01-01 RX ADMIN — LORAZEPAM 0.5 MG: 0.5 TABLET ORAL at 09:08

## 2019-01-01 RX ADMIN — DEXMEDETOMIDINE HYDROCHLORIDE 0.8 MCG/KG/HR: 4 INJECTION, SOLUTION INTRAVENOUS at 09:08

## 2019-01-01 RX ADMIN — CLOPIDOGREL 75 MG: 75 TABLET, FILM COATED ORAL at 01:07

## 2019-01-01 RX ADMIN — HEPARIN SODIUM 5000 UNITS: 5000 INJECTION, SOLUTION INTRAVENOUS; SUBCUTANEOUS at 01:07

## 2019-01-01 RX ADMIN — DOBUTAMINE IN DEXTROSE 2.5 MCG/KG/MIN: 200 INJECTION, SOLUTION INTRAVENOUS at 01:08

## 2019-01-01 RX ADMIN — PANTOPRAZOLE SODIUM 40 MG: 40 TABLET, DELAYED RELEASE ORAL at 08:07

## 2019-01-01 RX ADMIN — INSULIN ASPART 3 UNITS: 100 INJECTION, SOLUTION INTRAVENOUS; SUBCUTANEOUS at 05:06

## 2019-01-01 RX ADMIN — Medication: at 04:08

## 2019-01-01 RX ADMIN — CLOPIDOGREL 75 MG: 75 TABLET, FILM COATED ORAL at 09:05

## 2019-01-01 RX ADMIN — NOREPINEPHRINE BITARTRATE 0.02 MCG/KG/MIN: 1 INJECTION, SOLUTION, CONCENTRATE INTRAVENOUS at 04:08

## 2019-01-01 RX ADMIN — VALPROATE SODIUM 250 MG: 100 INJECTION, SOLUTION INTRAVENOUS at 09:08

## 2019-01-01 RX ADMIN — PROPOFOL 5 MCG/KG/MIN: 10 INJECTION, EMULSION INTRAVENOUS at 06:08

## 2019-01-01 RX ADMIN — PREDNISOLONE ACETATE 1 DROP: 10 SUSPENSION OPHTHALMIC at 01:06

## 2019-01-01 RX ADMIN — LORAZEPAM 1 MG: 1 TABLET ORAL at 06:07

## 2019-01-01 RX ADMIN — PANTOPRAZOLE SODIUM 40 MG: 40 TABLET, DELAYED RELEASE ORAL at 02:03

## 2019-01-01 RX ADMIN — INSULIN ASPART 3 UNITS: 100 INJECTION, SOLUTION INTRAVENOUS; SUBCUTANEOUS at 12:08

## 2019-01-01 RX ADMIN — CEFTRIAXONE SODIUM 2 G: 2 INJECTION, POWDER, FOR SOLUTION INTRAMUSCULAR; INTRAVENOUS at 05:06

## 2019-01-01 RX ADMIN — VALPROATE SODIUM 250 MG: 100 INJECTION, SOLUTION INTRAVENOUS at 02:08

## 2019-01-01 RX ADMIN — CHLORHEXIDINE GLUCONATE 0.12% ORAL RINSE 15 ML: 1.2 LIQUID ORAL at 12:08

## 2019-01-01 RX ADMIN — INSULIN DETEMIR 2 UNITS: 100 INJECTION, SOLUTION SUBCUTANEOUS at 10:06

## 2019-01-01 RX ADMIN — INSULIN ASPART 2 UNITS: 100 INJECTION, SOLUTION INTRAVENOUS; SUBCUTANEOUS at 08:06

## 2019-01-01 RX ADMIN — SEVELAMER CARBONATE 800 MG: 800 TABLET, FILM COATED ORAL at 08:05

## 2019-01-01 RX ADMIN — ASPIRIN 81 MG: 81 TABLET, COATED ORAL at 02:03

## 2019-01-01 RX ADMIN — CLOPIDOGREL 75 MG: 75 TABLET, FILM COATED ORAL at 08:08

## 2019-01-01 RX ADMIN — SODIUM CHLORIDE: 0.9 INJECTION, SOLUTION INTRAVENOUS at 04:08

## 2019-01-01 RX ADMIN — LEVETIRACETAM 500 MG: 500 TABLET ORAL at 09:06

## 2019-01-01 RX ADMIN — RAMELTEON 8 MG: 8 TABLET, FILM COATED ORAL at 11:06

## 2019-01-01 RX ADMIN — VASOPRESSIN 0.04 UNITS/MIN: 20 INJECTION INTRAVENOUS at 06:08

## 2019-01-01 RX ADMIN — PREGABALIN 100 MG: 50 CAPSULE ORAL at 09:03

## 2019-01-01 RX ADMIN — PIPERACILLIN AND TAZOBACTAM 4.5 G: 4; .5 INJECTION, POWDER, LYOPHILIZED, FOR SOLUTION INTRAVENOUS; PARENTERAL at 05:08

## 2019-01-01 RX ADMIN — IOHEXOL 100 ML: 350 INJECTION, SOLUTION INTRAVENOUS at 07:06

## 2019-01-01 RX ADMIN — NOREPINEPHRINE BITARTRATE 0.08 MCG/KG/MIN: 1 INJECTION, SOLUTION, CONCENTRATE INTRAVENOUS at 02:08

## 2019-01-01 RX ADMIN — Medication 1 CAPSULE: at 09:05

## 2019-01-01 RX ADMIN — PREGABALIN 100 MG: 50 CAPSULE ORAL at 02:03

## 2019-01-01 RX ADMIN — BACITRACIN ZINC AND POLYMYXIN B SULFATE: 500; 10000 OINTMENT TOPICAL at 12:03

## 2019-01-01 RX ADMIN — Medication 1250 MG: at 08:03

## 2019-01-01 RX ADMIN — INSULIN ASPART 1 UNITS: 100 INJECTION, SOLUTION INTRAVENOUS; SUBCUTANEOUS at 11:07

## 2019-01-01 RX ADMIN — SULFAMETHOXAZOLE AND TRIMETHOPRIM 1 TABLET: 800; 160 TABLET ORAL at 10:08

## 2019-01-01 RX ADMIN — SENNOSIDES,DOCUSATE SODIUM 1 TABLET: 8.6; 5 TABLET, FILM COATED ORAL at 01:05

## 2019-01-01 RX ADMIN — FLUDROCORTISONE ACETATE 100 MCG: 0.1 TABLET ORAL at 01:05

## 2019-01-01 RX ADMIN — LEVETIRACETAM 500 MG: 500 TABLET, FILM COATED ORAL at 10:06

## 2019-01-01 RX ADMIN — LEVETIRACETAM 500 MG: 5 INJECTION INTRAVENOUS at 10:05

## 2019-01-01 RX ADMIN — PREGABALIN 100 MG: 50 CAPSULE ORAL at 01:05

## 2019-01-01 RX ADMIN — CLOPIDOGREL 75 MG: 75 TABLET, FILM COATED ORAL at 02:03

## 2019-01-01 RX ADMIN — ACETAMINOPHEN 650 MG: 325 TABLET ORAL at 04:06

## 2019-01-01 RX ADMIN — LEVETIRACETAM 500 MG: 500 TABLET, FILM COATED ORAL at 08:03

## 2019-01-01 RX ADMIN — Medication 0.04 MCG/KG/MIN: at 05:08

## 2019-01-01 RX ADMIN — ATORVASTATIN CALCIUM 80 MG: 20 TABLET, FILM COATED ORAL at 02:03

## 2019-01-01 RX ADMIN — PROPOFOL 20 MCG/KG/MIN: 10 INJECTION, EMULSION INTRAVENOUS at 05:08

## 2019-01-01 RX ADMIN — PREDNISOLONE ACETATE 1 DROP: 10 SUSPENSION/ DROPS OPHTHALMIC at 10:08

## 2019-01-01 RX ADMIN — SODIUM CHLORIDE: 0.9 INJECTION, SOLUTION INTRAVENOUS at 01:07

## 2019-01-01 RX ADMIN — MIDODRINE HYDROCHLORIDE 10 MG: 5 TABLET ORAL at 04:06

## 2019-01-01 RX ADMIN — PREDNISONE 20 MG: 20 TABLET ORAL at 10:07

## 2019-01-01 RX ADMIN — LIDOCAINE HYDROCHLORIDE 1 ML: 10 INJECTION, SOLUTION INFILTRATION; PERINEURAL at 03:08

## 2019-01-01 RX ADMIN — Medication 50 MCG/HR: at 12:08

## 2019-01-01 RX ADMIN — Medication 200 MCG/HR: at 11:08

## 2019-01-01 RX ADMIN — SEVELAMER CARBONATE 800 MG: 800 TABLET, FILM COATED ORAL at 09:03

## 2019-01-01 RX ADMIN — NORTRIPTYLINE HYDROCHLORIDE 25 MG: 25 CAPSULE ORAL at 02:05

## 2019-01-01 RX ADMIN — ATORVASTATIN CALCIUM 80 MG: 20 TABLET, FILM COATED ORAL at 01:06

## 2019-01-01 RX ADMIN — SEVELAMER CARBONATE 1600 MG: 800 TABLET, FILM COATED ORAL at 09:06

## 2019-01-01 RX ADMIN — PIPERACILLIN AND TAZOBACTAM 4.5 G: 4; .5 INJECTION, POWDER, LYOPHILIZED, FOR SOLUTION INTRAVENOUS; PARENTERAL at 05:03

## 2019-01-01 RX ADMIN — LEVETIRACETAM 500 MG: 500 TABLET, FILM COATED ORAL at 10:07

## 2019-01-01 RX ADMIN — PREDNISOLONE ACETATE 1 DROP: 10 SUSPENSION/ DROPS OPHTHALMIC at 03:03

## 2019-01-01 RX ADMIN — PIPERACILLIN AND TAZOBACTAM 4.5 G: 4; .5 INJECTION, POWDER, LYOPHILIZED, FOR SOLUTION INTRAVENOUS; PARENTERAL at 09:08

## 2019-01-01 RX ADMIN — PROPOFOL 20 MCG/KG/MIN: 10 INJECTION, EMULSION INTRAVENOUS at 01:08

## 2019-01-01 RX ADMIN — SEVELAMER CARBONATE 800 MG: 800 TABLET, FILM COATED ORAL at 06:07

## 2019-01-01 RX ADMIN — LEVETIRACETAM 500 MG: 500 TABLET, FILM COATED ORAL at 09:03

## 2019-01-01 RX ADMIN — FAMOTIDINE 20 MG: 20 TABLET, FILM COATED ORAL at 09:08

## 2019-01-01 RX ADMIN — PROPOFOL 10 MCG/KG/MIN: 10 INJECTION, EMULSION INTRAVENOUS at 01:08

## 2019-01-01 RX ADMIN — INSULIN ASPART 3 UNITS: 100 INJECTION, SOLUTION INTRAVENOUS; SUBCUTANEOUS at 10:07

## 2019-01-01 RX ADMIN — HEPARIN SODIUM AND DEXTROSE 13 UNITS/KG/HR: 10000; 5 INJECTION INTRAVENOUS at 05:08

## 2019-01-01 RX ADMIN — PREDNISOLONE ACETATE 1 DROP: 10 SUSPENSION OPHTHALMIC at 11:06

## 2019-01-01 RX ADMIN — HYDROCODONE BITARTRATE AND ACETAMINOPHEN 1 TABLET: 5; 325 TABLET ORAL at 08:08

## 2019-01-01 RX ADMIN — LIDOCAINE HYDROCHLORIDE 10 ML: 10 INJECTION INFILTRATION; PERINEURAL at 02:08

## 2019-01-01 RX ADMIN — PREDNISONE 20 MG: 10 TABLET ORAL at 08:06

## 2019-01-01 RX ADMIN — MEROPENEM 2 G: 1 INJECTION, POWDER, FOR SOLUTION INTRAVENOUS at 04:08

## 2019-01-01 RX ADMIN — VANCOMYCIN HYDROCHLORIDE 1750 MG: 100 INJECTION, POWDER, LYOPHILIZED, FOR SOLUTION INTRAVENOUS at 08:08

## 2019-01-01 RX ADMIN — SODIUM CHLORIDE: 0.9 INJECTION, SOLUTION INTRAVENOUS at 09:05

## 2019-01-01 RX ADMIN — INSULIN ASPART 3 UNITS: 100 INJECTION, SOLUTION INTRAVENOUS; SUBCUTANEOUS at 06:08

## 2019-01-01 RX ADMIN — POTASSIUM CHLORIDE 10 MEQ: 10 INJECTION, SOLUTION INTRAVENOUS at 07:08

## 2019-01-01 RX ADMIN — FENTANYL CITRATE 50 MCG: 50 INJECTION, SOLUTION INTRAMUSCULAR; INTRAVENOUS at 12:08

## 2019-01-01 RX ADMIN — PANTOPRAZOLE SODIUM 40 MG: 40 TABLET, DELAYED RELEASE ORAL at 02:06

## 2019-01-01 RX ADMIN — HYDROCORTISONE SODIUM SUCCINATE 100 MG: 100 INJECTION, POWDER, FOR SOLUTION INTRAMUSCULAR; INTRAVENOUS at 01:08

## 2019-01-01 RX ADMIN — NEPHROCAP 1 CAPSULE: 1 CAP ORAL at 08:07

## 2019-01-01 RX ADMIN — NOREPINEPHRINE BITARTRATE 0.04 MCG/KG/MIN: 1 INJECTION, SOLUTION, CONCENTRATE INTRAVENOUS at 02:08

## 2019-01-01 RX ADMIN — PREDNISONE 20 MG: 20 TABLET ORAL at 07:07

## 2019-01-01 RX ADMIN — PROPOFOL 15 MCG/KG/MIN: 10 INJECTION, EMULSION INTRAVENOUS at 12:08

## 2019-01-01 RX ADMIN — LORAZEPAM 1 MG: 1 TABLET ORAL at 03:07

## 2019-01-01 RX ADMIN — OXYCODONE HYDROCHLORIDE 5 MG: 5 TABLET ORAL at 06:07

## 2019-01-01 RX ADMIN — CLOPIDOGREL 75 MG: 75 TABLET, FILM COATED ORAL at 08:07

## 2019-01-01 RX ADMIN — LEVETIRACETAM 500 MG: 500 TABLET, FILM COATED ORAL at 10:08

## 2019-01-01 RX ADMIN — PREDNISOLONE ACETATE 1 DROP: 10 SUSPENSION OPHTHALMIC at 08:07

## 2019-01-01 RX ADMIN — POLYETHYLENE GLYCOL 3350 17 G: 17 POWDER, FOR SOLUTION ORAL at 06:08

## 2019-01-01 RX ADMIN — SENNOSIDES AND DOCUSATE SODIUM 1 TABLET: 8.6; 5 TABLET ORAL at 01:06

## 2019-01-01 RX ADMIN — NORTRIPTYLINE HYDROCHLORIDE 25 MG: 25 CAPSULE ORAL at 06:03

## 2019-01-01 RX ADMIN — ATORVASTATIN CALCIUM 80 MG: 20 TABLET, FILM COATED ORAL at 08:07

## 2019-01-01 RX ADMIN — ATORVASTATIN CALCIUM 80 MG: 20 TABLET, FILM COATED ORAL at 11:03

## 2019-01-01 RX ADMIN — ACETAMINOPHEN 1000 MG: 500 TABLET ORAL at 01:08

## 2019-01-01 RX ADMIN — DEXMEDETOMIDINE HYDROCHLORIDE 0.6 MCG/KG/HR: 4 INJECTION, SOLUTION INTRAVENOUS at 06:08

## 2019-01-01 RX ADMIN — FLUDROCORTISONE ACETATE 200 MCG: 0.1 TABLET ORAL at 12:07

## 2019-01-01 RX ADMIN — STANDARDIZED SENNA CONCENTRATE AND DOCUSATE SODIUM 1 TABLET: 8.6; 5 TABLET, FILM COATED ORAL at 10:03

## 2019-01-01 RX ADMIN — LEVETIRACETAM 500 MG: 500 TABLET ORAL at 09:03

## 2019-01-01 RX ADMIN — LEVETIRACETAM 500 MG: 500 TABLET ORAL at 01:05

## 2019-01-01 RX ADMIN — INSULIN ASPART 8 UNITS: 100 INJECTION, SOLUTION INTRAVENOUS; SUBCUTANEOUS at 07:07

## 2019-01-01 RX ADMIN — WARFARIN SODIUM 5 MG: 2.5 TABLET ORAL at 06:06

## 2019-01-01 RX ADMIN — METHYLPREDNISOLONE SODIUM SUCCINATE 16 MG: 40 INJECTION, POWDER, FOR SOLUTION INTRAMUSCULAR; INTRAVENOUS at 10:05

## 2019-01-01 RX ADMIN — MIDODRINE HYDROCHLORIDE 10 MG: 5 TABLET ORAL at 04:05

## 2019-01-01 RX ADMIN — FLUDROCORTISONE ACETATE 100 MCG: 0.1 TABLET ORAL at 11:03

## 2019-01-01 RX ADMIN — DEXMEDETOMIDINE HYDROCHLORIDE 0.6 MCG/KG/HR: 4 INJECTION, SOLUTION INTRAVENOUS at 08:08

## 2019-01-01 RX ADMIN — LIDOCAINE HYDROCHLORIDE 200 MG: 10 INJECTION, SOLUTION INFILTRATION; PERINEURAL at 08:08

## 2019-01-01 RX ADMIN — INSULIN DETEMIR 2 UNITS: 100 INJECTION, SOLUTION SUBCUTANEOUS at 08:06

## 2019-01-01 RX ADMIN — CEFTRIAXONE SODIUM 2 G: 2 INJECTION, SOLUTION INTRAVENOUS at 05:06

## 2019-01-01 RX ADMIN — MIDODRINE HYDROCHLORIDE 15 MG: 5 TABLET ORAL at 09:03

## 2019-01-01 RX ADMIN — NORTRIPTYLINE HYDROCHLORIDE 25 MG: 25 CAPSULE ORAL at 09:03

## 2019-01-01 RX ADMIN — MIDODRINE HYDROCHLORIDE 10 MG: 5 TABLET ORAL at 04:08

## 2019-01-01 RX ADMIN — METHYLPREDNISOLONE SODIUM SUCCINATE 40 MG: 40 INJECTION, POWDER, FOR SOLUTION INTRAMUSCULAR; INTRAVENOUS at 09:06

## 2019-01-01 RX ADMIN — PROPOFOL 7.5 MCG/KG/MIN: 10 INJECTION, EMULSION INTRAVENOUS at 06:08

## 2019-01-01 RX ADMIN — NOREPINEPHRINE BITARTRATE 0.48 MCG/KG/MIN: 1 INJECTION, SOLUTION, CONCENTRATE INTRAVENOUS at 06:08

## 2019-01-01 RX ADMIN — WARFARIN SODIUM 5 MG: 5 TABLET ORAL at 06:05

## 2019-01-01 RX ADMIN — METHYLPREDNISOLONE SODIUM SUCCINATE 40 MG: 40 INJECTION, POWDER, FOR SOLUTION INTRAMUSCULAR; INTRAVENOUS at 12:06

## 2019-01-01 RX ADMIN — DEXMEDETOMIDINE HYDROCHLORIDE 1 MCG/KG/HR: 4 INJECTION, SOLUTION INTRAVENOUS at 05:08

## 2019-01-01 RX ADMIN — PREDNISONE 20 MG: 20 TABLET ORAL at 02:07

## 2019-01-01 RX ADMIN — PANTOPRAZOLE SODIUM 40 MG: 40 TABLET, DELAYED RELEASE ORAL at 09:05

## 2019-01-01 RX ADMIN — WARFARIN SODIUM 2.5 MG: 2.5 TABLET ORAL at 05:07

## 2019-01-01 RX ADMIN — SENNOSIDES AND DOCUSATE SODIUM 1 TABLET: 8.6; 5 TABLET ORAL at 11:06

## 2019-01-01 RX ADMIN — LEVETIRACETAM 500 MG: 500 TABLET, FILM COATED ORAL at 01:06

## 2019-01-01 RX ADMIN — SODIUM POLYSTYRENE SULFONATE 30 G: 15 SUSPENSION ORAL; RECTAL at 02:04

## 2019-01-01 RX ADMIN — CINACALCET HYDROCHLORIDE 30 MG: 30 TABLET, FILM COATED ORAL at 08:05

## 2019-01-01 RX ADMIN — MAGNESIUM SULFATE IN WATER 2 G: 40 INJECTION, SOLUTION INTRAVENOUS at 01:08

## 2019-01-01 RX ADMIN — PROPOFOL 15 MCG/KG/MIN: 10 INJECTION, EMULSION INTRAVENOUS at 03:08

## 2019-01-01 RX ADMIN — MEROPENEM AND SODIUM CHLORIDE 1 G: 1 INJECTION, SOLUTION INTRAVENOUS at 05:08

## 2019-01-01 RX ADMIN — SEVELAMER CARBONATE 800 MG: 800 TABLET, FILM COATED ORAL at 07:03

## 2019-01-01 RX ADMIN — INSULIN ASPART 2 UNITS: 100 INJECTION, SOLUTION INTRAVENOUS; SUBCUTANEOUS at 09:07

## 2019-01-01 RX ADMIN — DOXYCYCLINE HYCLATE 100 MG: 100 TABLET, COATED ORAL at 10:03

## 2019-01-01 RX ADMIN — PREDNISONE 20 MG: 20 TABLET ORAL at 01:07

## 2019-01-01 RX ADMIN — LIDOCAINE HYDROCHLORIDE 0.3 MG: 10 INJECTION, SOLUTION EPIDURAL; INFILTRATION; INTRACAUDAL; PERINEURAL at 10:04

## 2019-01-01 RX ADMIN — ASPIRIN 81 MG: 81 TABLET, COATED ORAL at 12:06

## 2019-01-01 RX ADMIN — MEROPENEM 2 G: 1 INJECTION, POWDER, FOR SOLUTION INTRAVENOUS at 02:08

## 2019-01-01 RX ADMIN — MAGNESIUM SULFATE IN WATER 2 G: 40 INJECTION, SOLUTION INTRAVENOUS at 06:08

## 2019-01-01 RX ADMIN — VASOPRESSIN 0.04 UNITS/MIN: 20 INJECTION INTRAVENOUS at 04:08

## 2019-01-01 RX ADMIN — VALPROATE SODIUM 250 MG: 100 INJECTION, SOLUTION INTRAVENOUS at 11:08

## 2019-01-01 RX ADMIN — INSULIN ASPART 8 UNITS: 100 INJECTION, SOLUTION INTRAVENOUS; SUBCUTANEOUS at 05:07

## 2019-01-01 RX ADMIN — FLUDROCORTISONE ACETATE 100 MCG: 0.1 TABLET ORAL at 09:04

## 2019-01-01 RX ADMIN — PROPOFOL 5 MCG/KG/MIN: 10 INJECTION, EMULSION INTRAVENOUS at 07:08

## 2019-01-01 RX ADMIN — GABAPENTIN 600 MG: 300 CAPSULE ORAL at 01:05

## 2019-01-01 RX ADMIN — AMMONIUM LACTATE: 12 LOTION TOPICAL at 01:07

## 2019-01-01 RX ADMIN — HYDROCORTISONE SODIUM SUCCINATE 50 MG: 100 INJECTION, POWDER, FOR SOLUTION INTRAMUSCULAR; INTRAVENOUS at 10:08

## 2019-01-01 RX ADMIN — PREDNISOLONE ACETATE 1 DROP: 10 SUSPENSION/ DROPS OPHTHALMIC at 08:05

## 2019-01-01 RX ADMIN — ACETAMINOPHEN 650 MG: 325 TABLET ORAL at 08:08

## 2019-01-01 RX ADMIN — PROPOFOL 10 MCG/KG/MIN: 10 INJECTION, EMULSION INTRAVENOUS at 11:08

## 2019-01-01 RX ADMIN — INSULIN ASPART 4 UNITS: 100 INJECTION, SOLUTION INTRAVENOUS; SUBCUTANEOUS at 05:06

## 2019-01-01 RX ADMIN — MIDODRINE HYDROCHLORIDE 10 MG: 5 TABLET ORAL at 05:07

## 2019-01-01 RX ADMIN — NOREPINEPHRINE BITARTRATE 0.22 MCG/KG/MIN: 1 INJECTION, SOLUTION, CONCENTRATE INTRAVENOUS at 02:08

## 2019-01-01 RX ADMIN — NORTRIPTYLINE HYDROCHLORIDE 25 MG: 25 CAPSULE ORAL at 05:03

## 2019-01-01 RX ADMIN — Medication 2500 UNITS: at 12:07

## 2019-01-01 RX ADMIN — KETOROLAC TROMETHAMINE 1 DROP: 5 SOLUTION/ DROPS OPHTHALMIC at 10:03

## 2019-01-01 RX ADMIN — MAGNESIUM SULFATE IN WATER 2 G: 40 INJECTION, SOLUTION INTRAVENOUS at 07:08

## 2019-01-01 RX ADMIN — CLOPIDOGREL 75 MG: 75 TABLET, FILM COATED ORAL at 09:04

## 2019-01-01 RX ADMIN — PROPOFOL 10 MCG/KG/MIN: 10 INJECTION, EMULSION INTRAVENOUS at 07:08

## 2019-01-01 RX ADMIN — POTASSIUM CHLORIDE 30 MEQ: 20 SOLUTION ORAL at 08:03

## 2019-01-01 RX ADMIN — FLUDROCORTISONE ACETATE 200 MCG: 0.1 TABLET ORAL at 07:07

## 2019-01-01 RX ADMIN — ACETAMINOPHEN 650 MG: 325 TABLET ORAL at 01:06

## 2019-01-01 RX ADMIN — WARFARIN SODIUM 1 MG: 1 TABLET ORAL at 08:05

## 2019-01-01 RX ADMIN — PIPERACILLIN AND TAZOBACTAM 4.5 G: 4; .5 INJECTION, POWDER, LYOPHILIZED, FOR SOLUTION INTRAVENOUS; PARENTERAL at 07:06

## 2019-01-01 RX ADMIN — SEVELAMER CARBONATE 1600 MG: 800 TABLET, FILM COATED ORAL at 06:06

## 2019-01-01 RX ADMIN — EPOETIN ALFA-EPBX 5500 UNITS: 10000 INJECTION, SOLUTION INTRAVENOUS; SUBCUTANEOUS at 09:08

## 2019-01-01 RX ADMIN — POLYETHYLENE GLYCOL 3350 17 G: 17 POWDER, FOR SOLUTION ORAL at 02:03

## 2019-01-01 RX ADMIN — SENNOSIDES,DOCUSATE SODIUM 1 TABLET: 8.6; 5 TABLET, FILM COATED ORAL at 09:05

## 2019-01-01 RX ADMIN — INSULIN ASPART 2 UNITS: 100 INJECTION, SOLUTION INTRAVENOUS; SUBCUTANEOUS at 06:07

## 2019-01-01 RX ADMIN — PREDNISOLONE ACETATE 1 DROP: 10 SUSPENSION/ DROPS OPHTHALMIC at 09:04

## 2019-01-01 RX ADMIN — Medication 0.1 MCG/KG/MIN: at 04:08

## 2019-01-01 RX ADMIN — SENNOSIDES,DOCUSATE SODIUM 1 TABLET: 8.6; 5 TABLET, FILM COATED ORAL at 10:07

## 2019-01-01 RX ADMIN — WARFARIN SODIUM 5 MG: 5 TABLET ORAL at 09:05

## 2019-01-01 RX ADMIN — NEPHROCAP 1 CAPSULE: 1 CAP ORAL at 02:06

## 2019-01-01 RX ADMIN — DEXMEDETOMIDINE HYDROCHLORIDE 0.9 MCG/KG/HR: 4 INJECTION, SOLUTION INTRAVENOUS at 03:08

## 2019-01-01 RX ADMIN — VALPROATE SODIUM 250 MG: 100 INJECTION, SOLUTION INTRAVENOUS at 04:08

## 2019-01-01 RX ADMIN — SENNOSIDES,DOCUSATE SODIUM 1 TABLET: 8.6; 5 TABLET, FILM COATED ORAL at 12:06

## 2019-01-01 RX ADMIN — SODIUM CHLORIDE 500 ML: 0.9 INJECTION, SOLUTION INTRAVENOUS at 03:03

## 2019-01-01 RX ADMIN — ACETAMINOPHEN 650 MG: 325 TABLET ORAL at 11:06

## 2019-01-01 RX ADMIN — LISINOPRIL 2.5 MG: 2.5 TABLET ORAL at 07:07

## 2019-01-01 RX ADMIN — SEVELAMER CARBONATE 1600 MG: 800 TABLET, FILM COATED ORAL at 04:06

## 2019-01-01 RX ADMIN — VANCOMYCIN HYDROCHLORIDE 1000 MG: 1 INJECTION, POWDER, LYOPHILIZED, FOR SOLUTION INTRAVENOUS at 06:03

## 2019-01-01 RX ADMIN — METHYLPREDNISOLONE SODIUM SUCCINATE 40 MG: 40 INJECTION, POWDER, FOR SOLUTION INTRAMUSCULAR; INTRAVENOUS at 08:05

## 2019-01-01 RX ADMIN — SODIUM CHLORIDE 300 ML: 0.9 INJECTION, SOLUTION INTRAVENOUS at 09:03

## 2019-01-01 RX ADMIN — NOREPINEPHRINE BITARTRATE 0.1 MCG/KG/MIN: 1 INJECTION, SOLUTION, CONCENTRATE INTRAVENOUS at 01:08

## 2019-01-01 RX ADMIN — ACETAMINOPHEN 650 MG: 325 TABLET ORAL at 08:07

## 2019-01-01 RX ADMIN — SODIUM CHLORIDE: 0.9 INJECTION, SOLUTION INTRAVENOUS at 08:07

## 2019-01-01 RX ADMIN — PANTOPRAZOLE SODIUM 40 MG: 40 TABLET, DELAYED RELEASE ORAL at 11:06

## 2019-01-01 RX ADMIN — DEXMEDETOMIDINE HYDROCHLORIDE 1.4 MCG/KG/HR: 4 INJECTION, SOLUTION INTRAVENOUS at 06:08

## 2019-01-01 RX ADMIN — PREDNISONE 20 MG: 10 TABLET ORAL at 12:06

## 2019-01-01 RX ADMIN — WARFARIN SODIUM 2.5 MG: 2.5 TABLET ORAL at 09:07

## 2019-01-01 RX ADMIN — SEVELAMER CARBONATE 1600 MG: 800 TABLET, FILM COATED ORAL at 01:06

## 2019-01-01 RX ADMIN — PREGABALIN 100 MG: 50 CAPSULE ORAL at 10:03

## 2019-01-01 RX ADMIN — PROPOFOL 20 MCG/KG/MIN: 10 INJECTION, EMULSION INTRAVENOUS at 09:08

## 2019-01-01 RX ADMIN — FLUDROCORTISONE ACETATE 200 MCG: 0.1 TABLET ORAL at 09:08

## 2019-01-01 RX ADMIN — SEVELAMER CARBONATE 800 MG: 800 TABLET, FILM COATED ORAL at 06:06

## 2019-01-01 RX ADMIN — LORAZEPAM 1 MG: 1 TABLET ORAL at 08:07

## 2019-01-01 RX ADMIN — ASPIRIN 81 MG: 81 TABLET, COATED ORAL at 11:06

## 2019-01-01 RX ADMIN — VANCOMYCIN HYDROCHLORIDE 1000 MG: 1 INJECTION, POWDER, LYOPHILIZED, FOR SOLUTION INTRAVENOUS at 05:08

## 2019-01-01 RX ADMIN — LORAZEPAM 0.5 MG: 0.5 TABLET ORAL at 02:08

## 2019-01-01 RX ADMIN — MEROPENEM 2 G: 1 INJECTION, POWDER, FOR SOLUTION INTRAVENOUS at 12:08

## 2019-01-01 RX ADMIN — VANCOMYCIN HYDROCHLORIDE 500 MG: 1 INJECTION, POWDER, LYOPHILIZED, FOR SOLUTION INTRAVENOUS at 04:06

## 2019-01-01 RX ADMIN — ETOMIDATE 20 MG: 2 INJECTION, SOLUTION INTRAVENOUS at 05:08

## 2019-01-01 RX ADMIN — MIDODRINE HYDROCHLORIDE 5 MG: 5 TABLET ORAL at 04:03

## 2019-01-01 RX ADMIN — INSULIN ASPART 1 UNITS: 100 INJECTION, SOLUTION INTRAVENOUS; SUBCUTANEOUS at 11:06

## 2019-01-01 RX ADMIN — INSULIN ASPART 4 UNITS: 100 INJECTION, SOLUTION INTRAVENOUS; SUBCUTANEOUS at 01:06

## 2019-01-01 RX ADMIN — FENTANYL CITRATE 25 MCG: 50 INJECTION, SOLUTION INTRAMUSCULAR; INTRAVENOUS at 07:08

## 2019-01-01 RX ADMIN — INSULIN ASPART 3 UNITS: 100 INJECTION, SOLUTION INTRAVENOUS; SUBCUTANEOUS at 12:06

## 2019-01-01 RX ADMIN — MEROPENEM 2 G: 1 INJECTION, POWDER, FOR SOLUTION INTRAVENOUS at 11:08

## 2019-01-01 RX ADMIN — METHYLPREDNISOLONE SODIUM SUCCINATE 40 MG: 40 INJECTION, POWDER, FOR SOLUTION INTRAMUSCULAR; INTRAVENOUS at 10:06

## 2019-01-01 RX ADMIN — INSULIN ASPART 3 UNITS: 100 INJECTION, SOLUTION INTRAVENOUS; SUBCUTANEOUS at 04:06

## 2019-01-01 RX ADMIN — PIPERACILLIN AND TAZOBACTAM 4.5 G: 4; .5 INJECTION, POWDER, LYOPHILIZED, FOR SOLUTION INTRAVENOUS; PARENTERAL at 10:03

## 2019-01-01 RX ADMIN — INSULIN ASPART 1 UNITS: 100 INJECTION, SOLUTION INTRAVENOUS; SUBCUTANEOUS at 10:08

## 2019-01-01 RX ADMIN — Medication 0.16 MCG/KG/MIN: at 06:08

## 2019-01-01 RX ADMIN — Medication 0.12 MCG/KG/MIN: at 10:08

## 2019-01-01 RX ADMIN — NOREPINEPHRINE BITARTRATE 0.46 MCG/KG/MIN: 1 INJECTION, SOLUTION, CONCENTRATE INTRAVENOUS at 12:08

## 2019-01-01 RX ADMIN — NOREPINEPHRINE BITARTRATE 0.13 MCG/KG/MIN: 1 INJECTION, SOLUTION, CONCENTRATE INTRAVENOUS at 09:08

## 2019-01-01 RX ADMIN — LEVETIRACETAM 500 MG: 5 INJECTION INTRAVENOUS at 10:08

## 2019-01-01 RX ADMIN — MIDAZOLAM HYDROCHLORIDE 2 MG: 1 INJECTION INTRAMUSCULAR; INTRAVENOUS at 11:08

## 2019-01-01 RX ADMIN — PREDNISOLONE ACETATE 1 DROP: 10 SUSPENSION/ DROPS OPHTHALMIC at 11:06

## 2019-01-01 RX ADMIN — PROPOFOL 15 MCG/KG/MIN: 10 INJECTION, EMULSION INTRAVENOUS at 01:08

## 2019-01-01 RX ADMIN — FLUDROCORTISONE ACETATE 100 MCG: 0.1 TABLET ORAL at 08:07

## 2019-01-01 RX ADMIN — LEVOFLOXACIN 500 MG: 500 INJECTION, SOLUTION INTRAVENOUS at 07:03

## 2019-01-01 RX ADMIN — IPRATROPIUM BROMIDE AND ALBUTEROL SULFATE 3 ML: .5; 3 SOLUTION RESPIRATORY (INHALATION) at 12:05

## 2019-01-01 RX ADMIN — WARFARIN SODIUM 7.5 MG: 2.5 TABLET ORAL at 07:06

## 2019-01-01 RX ADMIN — ACETAMINOPHEN 650 MG: 325 TABLET ORAL at 11:03

## 2019-01-01 RX ADMIN — LORAZEPAM 0.5 MG: 0.5 TABLET ORAL at 01:08

## 2019-01-01 RX ADMIN — PREDNISONE 20 MG: 20 TABLET ORAL at 09:05

## 2019-01-01 RX ADMIN — HEPARIN SODIUM AND DEXTROSE 11 UNITS/KG/HR: 10000; 5 INJECTION INTRAVENOUS at 09:08

## 2019-01-01 RX ADMIN — PREGABALIN 100 MG: 100 CAPSULE ORAL at 02:03

## 2019-01-01 RX ADMIN — MIDODRINE HYDROCHLORIDE 15 MG: 5 TABLET ORAL at 02:03

## 2019-01-01 RX ADMIN — MIDODRINE HYDROCHLORIDE 10 MG: 5 TABLET ORAL at 05:08

## 2019-01-01 RX ADMIN — SODIUM PHOSPHATE, MONOBASIC, MONOHYDRATE 20.01 MMOL: 276; 142 INJECTION, SOLUTION INTRAVENOUS at 06:08

## 2019-01-01 RX ADMIN — SODIUM PHOSPHATE, MONOBASIC, MONOHYDRATE 30 MMOL: 276; 142 INJECTION, SOLUTION INTRAVENOUS at 05:08

## 2019-01-01 RX ADMIN — ACETAMINOPHEN 650 MG: 325 TABLET ORAL at 03:07

## 2019-01-01 RX ADMIN — DEXMEDETOMIDINE HYDROCHLORIDE 0.7 MCG/KG/HR: 4 INJECTION, SOLUTION INTRAVENOUS at 12:08

## 2019-01-01 RX ADMIN — WARFARIN SODIUM 5 MG: 5 TABLET ORAL at 05:07

## 2019-01-01 RX ADMIN — HEPARIN SODIUM AND DEXTROSE 13 UNITS/KG/HR: 10000; 5 INJECTION INTRAVENOUS at 02:08

## 2019-01-01 RX ADMIN — PREGABALIN 100 MG: 100 CAPSULE ORAL at 09:04

## 2019-01-01 RX ADMIN — FENTANYL CITRATE 50 MCG: 50 INJECTION INTRAMUSCULAR; INTRAVENOUS at 08:08

## 2019-01-01 RX ADMIN — LEVOFLOXACIN 500 MG: 500 TABLET, FILM COATED ORAL at 07:03

## 2019-01-01 RX ADMIN — SEVELAMER CARBONATE 800 MG: 800 TABLET, FILM COATED ORAL at 05:08

## 2019-01-01 RX ADMIN — INSULIN ASPART 2 UNITS: 100 INJECTION, SOLUTION INTRAVENOUS; SUBCUTANEOUS at 12:07

## 2019-01-01 RX ADMIN — KETOROLAC TROMETHAMINE 1 DROP: 5 SOLUTION/ DROPS OPHTHALMIC at 01:03

## 2019-01-01 RX ADMIN — NOREPINEPHRINE BITARTRATE 0.24 MCG/KG/MIN: 1 INJECTION, SOLUTION, CONCENTRATE INTRAVENOUS at 09:08

## 2019-01-01 RX ADMIN — IPRATROPIUM BROMIDE AND ALBUTEROL SULFATE 3 ML: .5; 3 SOLUTION RESPIRATORY (INHALATION) at 03:05

## 2019-01-01 RX ADMIN — NEPHROCAP 1 CAPSULE: 1 CAP ORAL at 12:06

## 2019-01-01 RX ADMIN — PROMETHAZINE HYDROCHLORIDE 12.5 MG: 12.5 TABLET ORAL at 07:06

## 2019-01-01 RX ADMIN — BACITRACIN ZINC AND POLYMYXIN B SULFATE: 500; 10000 OINTMENT TOPICAL at 10:03

## 2019-01-01 RX ADMIN — PREDNISONE 20 MG: 20 TABLET ORAL at 02:03

## 2019-01-01 RX ADMIN — MIDODRINE HYDROCHLORIDE 5 MG: 5 TABLET ORAL at 08:03

## 2019-01-01 RX ADMIN — PREDNISONE 20 MG: 20 TABLET ORAL at 12:07

## 2019-01-01 RX ADMIN — INSULIN ASPART 4 UNITS: 100 INJECTION, SOLUTION INTRAVENOUS; SUBCUTANEOUS at 04:07

## 2019-01-01 RX ADMIN — ROCURONIUM BROMIDE 100 MG: 10 INJECTION, SOLUTION INTRAVENOUS at 11:08

## 2019-01-01 RX ADMIN — ATORVASTATIN CALCIUM 80 MG: 20 TABLET, FILM COATED ORAL at 05:03

## 2019-01-01 RX ADMIN — INSULIN ASPART 2 UNITS: 100 INJECTION, SOLUTION INTRAVENOUS; SUBCUTANEOUS at 05:07

## 2019-01-01 RX ADMIN — WARFARIN SODIUM 5 MG: 2.5 TABLET ORAL at 04:06

## 2019-01-01 RX ADMIN — INSULIN ASPART 5 UNITS: 100 INJECTION, SOLUTION INTRAVENOUS; SUBCUTANEOUS at 06:06

## 2019-01-01 RX ADMIN — PREDNISOLONE ACETATE 1 DROP: 10 SUSPENSION/ DROPS OPHTHALMIC at 01:03

## 2019-01-01 RX ADMIN — FLUDROCORTISONE ACETATE 100 MCG: 0.1 TABLET ORAL at 10:03

## 2019-01-01 RX ADMIN — INSULIN ASPART 2 UNITS: 100 INJECTION, SOLUTION INTRAVENOUS; SUBCUTANEOUS at 08:03

## 2019-01-01 RX ADMIN — SODIUM CHLORIDE: 0.9 INJECTION, SOLUTION INTRAVENOUS at 11:07

## 2019-01-01 RX ADMIN — SODIUM CHLORIDE, SODIUM LACTATE, POTASSIUM CHLORIDE, AND CALCIUM CHLORIDE 500 ML: .6; .31; .03; .02 INJECTION, SOLUTION INTRAVENOUS at 11:03

## 2019-01-01 RX ADMIN — SODIUM CHLORIDE 500 ML: 0.9 INJECTION, SOLUTION INTRAVENOUS at 12:07

## 2019-01-01 RX ADMIN — MEROPENEM AND SODIUM CHLORIDE 1 G: 1 INJECTION, SOLUTION INTRAVENOUS at 08:08

## 2019-01-01 RX ADMIN — PREDNISONE 20 MG: 20 TABLET ORAL at 10:08

## 2019-01-01 RX ADMIN — ACETAMINOPHEN 650 MG: 325 TABLET ORAL at 01:07

## 2019-01-01 RX ADMIN — HYDROCORTISONE SODIUM SUCCINATE 100 MG: 100 INJECTION, POWDER, FOR SOLUTION INTRAMUSCULAR; INTRAVENOUS at 03:08

## 2019-01-01 RX ADMIN — MIDODRINE HYDROCHLORIDE 10 MG: 5 TABLET ORAL at 01:06

## 2019-01-01 RX ADMIN — LORAZEPAM 1 MG: 1 TABLET ORAL at 10:07

## 2019-01-01 RX ADMIN — MIDODRINE HYDROCHLORIDE 5 MG: 5 TABLET ORAL at 05:03

## 2019-01-01 RX ADMIN — SODIUM CHLORIDE 300 ML: 0.9 INJECTION, SOLUTION INTRAVENOUS at 11:06

## 2019-01-01 RX ADMIN — SODIUM PHOSPHATE, MONOBASIC, MONOHYDRATE 20.01 MMOL: 276; 142 INJECTION, SOLUTION INTRAVENOUS at 04:08

## 2019-01-01 RX ADMIN — DEXMEDETOMIDINE HYDROCHLORIDE 0.4 MCG/KG/HR: 4 INJECTION, SOLUTION INTRAVENOUS at 01:08

## 2019-01-01 RX ADMIN — ATORVASTATIN CALCIUM 80 MG: 20 TABLET, FILM COATED ORAL at 05:06

## 2019-01-01 RX ADMIN — IPRATROPIUM BROMIDE AND ALBUTEROL SULFATE 3 ML: .5; 3 SOLUTION RESPIRATORY (INHALATION) at 06:05

## 2019-01-01 RX ADMIN — INSULIN ASPART 2 UNITS: 100 INJECTION, SOLUTION INTRAVENOUS; SUBCUTANEOUS at 06:06

## 2019-01-01 RX ADMIN — FLUDROCORTISONE ACETATE 100 MCG: 0.1 TABLET ORAL at 12:03

## 2019-01-01 RX ADMIN — FENTANYL CITRATE 50 MCG: 50 INJECTION, SOLUTION INTRAMUSCULAR; INTRAVENOUS at 02:08

## 2019-01-01 RX ADMIN — KETOROLAC TROMETHAMINE 1 DROP: 5 SOLUTION/ DROPS OPHTHALMIC at 02:03

## 2019-01-01 RX ADMIN — Medication 250 MCG/HR: at 12:08

## 2019-01-01 RX ADMIN — DEXMEDETOMIDINE HYDROCHLORIDE 0.8 MCG/KG/HR: 4 INJECTION, SOLUTION INTRAVENOUS at 03:08

## 2019-01-01 RX ADMIN — DEXMEDETOMIDINE HYDROCHLORIDE 0.6 MCG/KG/HR: 4 INJECTION, SOLUTION INTRAVENOUS at 07:08

## 2019-01-01 RX ADMIN — Medication 10 ML: at 06:06

## 2019-01-01 RX ADMIN — SODIUM PHOSPHATE, MONOBASIC, MONOHYDRATE 39.99 MMOL: 276; 142 INJECTION, SOLUTION INTRAVENOUS at 06:08

## 2019-01-01 RX ADMIN — PANTOPRAZOLE SODIUM 40 MG: 40 TABLET, DELAYED RELEASE ORAL at 09:08

## 2019-01-01 RX ADMIN — FENTANYL CITRATE 100 MCG: 50 INJECTION, SOLUTION INTRAMUSCULAR; INTRAVENOUS at 11:08

## 2019-01-01 RX ADMIN — LEVETIRACETAM 500 MG: 500 TABLET ORAL at 08:03

## 2019-01-01 RX ADMIN — MIDODRINE HYDROCHLORIDE 15 MG: 5 TABLET ORAL at 03:03

## 2019-01-01 RX ADMIN — METHYLPREDNISOLONE SODIUM SUCCINATE 40 MG: 40 INJECTION, POWDER, FOR SOLUTION INTRAMUSCULAR; INTRAVENOUS at 11:05

## 2019-01-01 RX ADMIN — MIDODRINE HYDROCHLORIDE 5 MG: 5 TABLET ORAL at 07:03

## 2019-01-01 RX ADMIN — HEPARIN SODIUM AND DEXTROSE 15 UNITS/KG/HR: 10000; 5 INJECTION INTRAVENOUS at 05:08

## 2019-01-01 RX ADMIN — CINACALCET HYDROCHLORIDE 30 MG: 30 TABLET, FILM COATED ORAL at 09:05

## 2019-01-01 RX ADMIN — HEPARIN SODIUM 5000 UNITS: 5000 INJECTION, SOLUTION INTRAVENOUS; SUBCUTANEOUS at 06:07

## 2019-01-01 RX ADMIN — MICONAZOLE NITRATE: 20 POWDER TOPICAL at 08:07

## 2019-01-01 RX ADMIN — SODIUM CHLORIDE: 0.9 INJECTION, SOLUTION INTRAVENOUS at 07:07

## 2019-01-01 RX ADMIN — Medication 250 MCG/HR: at 09:08

## 2019-01-01 RX ADMIN — PROPOFOL 20 MCG/KG/MIN: 10 INJECTION, EMULSION INTRAVENOUS at 02:08

## 2019-01-01 RX ADMIN — BISACODYL 10 MG RECTAL SUPPOSITORY 10 MG: at 02:08

## 2019-01-01 RX ADMIN — ACETAMINOPHEN 650 MG: 325 TABLET ORAL at 03:05

## 2019-01-01 RX ADMIN — MEROPENEM 2 G: 1 INJECTION, POWDER, FOR SOLUTION INTRAVENOUS at 01:08

## 2019-01-01 RX ADMIN — SEVELAMER CARBONATE 800 MG: 800 TABLET, FILM COATED ORAL at 04:06

## 2019-01-01 RX ADMIN — LEVETIRACETAM 500 MG: 500 TABLET, FILM COATED ORAL at 02:07

## 2019-01-01 RX ADMIN — Medication 25 MCG/HR: at 10:08

## 2019-01-01 RX ADMIN — MIDODRINE HYDROCHLORIDE 10 MG: 5 TABLET ORAL at 09:05

## 2019-01-01 RX ADMIN — LORAZEPAM 1 MG: 2 INJECTION INTRAMUSCULAR; INTRAVENOUS at 03:08

## 2019-01-01 RX ADMIN — SEVELAMER CARBONATE 800 MG: 800 TABLET, FILM COATED ORAL at 02:07

## 2019-01-01 RX ADMIN — PREDNISONE 20 MG: 10 TABLET ORAL at 02:06

## 2019-01-01 RX ADMIN — VASOPRESSIN 0.04 UNITS/MIN: 20 INJECTION INTRAVENOUS at 05:08

## 2019-01-01 RX ADMIN — PHYTONADIONE 10 MG: 10 INJECTION, EMULSION INTRAMUSCULAR; INTRAVENOUS; SUBCUTANEOUS at 02:07

## 2019-01-01 RX ADMIN — PROPOFOL 10 MCG/KG/MIN: 10 INJECTION, EMULSION INTRAVENOUS at 03:08

## 2019-01-01 RX ADMIN — PREDNISONE 20 MG: 10 TABLET ORAL at 08:07

## 2019-01-01 RX ADMIN — HEPARIN SODIUM AND DEXTROSE 12 UNITS/KG/HR: 10000; 5 INJECTION INTRAVENOUS at 12:08

## 2019-01-01 RX ADMIN — Medication: at 12:08

## 2019-01-01 RX ADMIN — HEPARIN SODIUM 5000 UNITS: 5000 INJECTION, SOLUTION INTRAVENOUS; SUBCUTANEOUS at 02:07

## 2019-01-01 RX ADMIN — NORTRIPTYLINE HYDROCHLORIDE 25 MG: 25 CAPSULE ORAL at 10:03

## 2019-01-01 RX ADMIN — ACETAMINOPHEN 650 MG: 325 TABLET ORAL at 11:08

## 2019-01-01 RX ADMIN — VASOPRESSIN 0.04 UNITS/MIN: 20 INJECTION INTRAVENOUS at 01:08

## 2019-01-01 RX ADMIN — MIDODRINE HYDROCHLORIDE 10 MG: 5 TABLET ORAL at 02:03

## 2019-01-01 RX ADMIN — CLOPIDOGREL 75 MG: 75 TABLET, FILM COATED ORAL at 07:07

## 2019-01-01 RX ADMIN — CEFTRIAXONE SODIUM 2 G: 2 INJECTION, POWDER, FOR SOLUTION INTRAMUSCULAR; INTRAVENOUS at 10:06

## 2019-01-01 RX ADMIN — PANTOPRAZOLE SODIUM 40 MG: 40 TABLET, DELAYED RELEASE ORAL at 10:08

## 2019-01-01 RX ADMIN — SODIUM POLYSTYRENE SULFONATE 15 G: 1 POWDER ORAL; RECTAL at 01:05

## 2019-01-01 RX ADMIN — HYDROCORTISONE SODIUM SUCCINATE 100 MG: 100 INJECTION, POWDER, FOR SOLUTION INTRAMUSCULAR; INTRAVENOUS at 10:08

## 2019-01-01 RX ADMIN — NEPHROCAP 1 CAPSULE: 1 CAP ORAL at 11:06

## 2019-01-01 RX ADMIN — PANTOPRAZOLE SODIUM 40 MG: 20 TABLET, DELAYED RELEASE ORAL at 09:05

## 2019-01-01 RX ADMIN — MICONAZOLE NITRATE: 20 POWDER TOPICAL at 11:08

## 2019-01-01 RX ADMIN — Medication 10 ML: at 01:08

## 2019-01-01 RX ADMIN — HEPARIN SODIUM AND DEXTROSE 11 UNITS/KG/HR: 10000; 5 INJECTION INTRAVENOUS at 04:08

## 2019-01-01 RX ADMIN — MIDODRINE HYDROCHLORIDE 10 MG: 5 TABLET ORAL at 07:06

## 2019-01-01 RX ADMIN — NOREPINEPHRINE BITARTRATE 0.06 MCG/KG/MIN: 1 INJECTION, SOLUTION, CONCENTRATE INTRAVENOUS at 04:08

## 2019-01-01 RX ADMIN — FLUDROCORTISONE ACETATE 100 MCG: 0.1 TABLET ORAL at 03:03

## 2019-01-01 RX ADMIN — MIDODRINE HYDROCHLORIDE 5 MG: 5 TABLET ORAL at 09:03

## 2019-01-01 RX ADMIN — EPOETIN ALFA-EPBX 5400 UNITS: 10000 INJECTION, SOLUTION INTRAVENOUS; SUBCUTANEOUS at 07:07

## 2019-01-01 RX ADMIN — HEPARIN SODIUM AND DEXTROSE 11 UNITS/KG/HR: 10000; 5 INJECTION INTRAVENOUS at 03:08

## 2019-01-01 RX ADMIN — PROPOFOL 25 MCG/KG/MIN: 10 INJECTION, EMULSION INTRAVENOUS at 04:08

## 2019-01-01 RX ADMIN — ROCURONIUM BROMIDE 50 MG: 10 INJECTION, SOLUTION INTRAVENOUS at 12:08

## 2019-01-01 RX ADMIN — AMMONIUM LACTATE: 12 LOTION TOPICAL at 03:07

## 2019-01-01 RX ADMIN — DEXMEDETOMIDINE HYDROCHLORIDE 0.2 MCG/KG/HR: 4 INJECTION, SOLUTION INTRAVENOUS at 10:08

## 2019-01-01 RX ADMIN — ACETAMINOPHEN 650 MG: 325 TABLET ORAL at 12:06

## 2019-01-01 RX ADMIN — PROPOFOL 25 MCG/KG/MIN: 10 INJECTION, EMULSION INTRAVENOUS at 10:08

## 2019-01-01 RX ADMIN — NOREPINEPHRINE BITARTRATE 0.42 MCG/KG/MIN: 1 INJECTION, SOLUTION, CONCENTRATE INTRAVENOUS at 06:08

## 2019-01-01 RX ADMIN — AMMONIUM LACTATE: 12 LOTION TOPICAL at 12:07

## 2019-01-01 RX ADMIN — INSULIN ASPART 2 UNITS: 100 INJECTION, SOLUTION INTRAVENOUS; SUBCUTANEOUS at 02:06

## 2019-01-01 RX ADMIN — LEVETIRACETAM 500 MG: 500 TABLET ORAL at 11:06

## 2019-01-01 RX ADMIN — WARFARIN SODIUM 5 MG: 5 TABLET ORAL at 07:03

## 2019-01-01 RX ADMIN — MIDAZOLAM HYDROCHLORIDE 2 MG: 1 INJECTION, SOLUTION INTRAMUSCULAR; INTRAVENOUS at 10:05

## 2019-01-01 RX ADMIN — WARFARIN SODIUM 3.5 MG: 2.5 TABLET ORAL at 07:06

## 2019-01-01 RX ADMIN — FLUDROCORTISONE ACETATE 100 MCG: 0.1 TABLET ORAL at 08:05

## 2019-01-01 RX ADMIN — NOREPINEPHRINE BITARTRATE 0.06 MCG/KG/MIN: 1 INJECTION, SOLUTION, CONCENTRATE INTRAVENOUS at 12:08

## 2019-01-01 RX ADMIN — MAGNESIUM SULFATE IN WATER 2 G: 40 INJECTION, SOLUTION INTRAVENOUS at 04:08

## 2019-01-01 RX ADMIN — SODIUM CHLORIDE: 0.9 INJECTION, SOLUTION INTRAVENOUS at 05:08

## 2019-01-01 RX ADMIN — PREGABALIN 100 MG: 100 CAPSULE ORAL at 08:03

## 2019-01-01 RX ADMIN — NOREPINEPHRINE BITARTRATE 0.04 MCG/KG/MIN: 1 INJECTION, SOLUTION, CONCENTRATE INTRAVENOUS at 10:08

## 2019-01-01 RX ADMIN — LEVETIRACETAM 500 MG: 5 INJECTION INTRAVENOUS at 12:05

## 2019-01-01 RX ADMIN — DEXMEDETOMIDINE HYDROCHLORIDE 0.5 MCG/KG/HR: 4 INJECTION, SOLUTION INTRAVENOUS at 09:08

## 2019-01-01 RX ADMIN — PIPERACILLIN AND TAZOBACTAM 4.5 G: 4; .5 INJECTION, POWDER, LYOPHILIZED, FOR SOLUTION INTRAVENOUS; PARENTERAL at 01:08

## 2019-01-01 RX ADMIN — PREDNISOLONE ACETATE 1 DROP: 10 SUSPENSION OPHTHALMIC at 04:06

## 2019-01-01 RX ADMIN — INSULIN ASPART 4 UNITS: 100 INJECTION, SOLUTION INTRAVENOUS; SUBCUTANEOUS at 01:07

## 2019-01-01 RX ADMIN — DEXMEDETOMIDINE HYDROCHLORIDE 1.2 MCG/KG/HR: 4 INJECTION, SOLUTION INTRAVENOUS at 11:08

## 2019-01-01 RX ADMIN — LEVOFLOXACIN 750 MG: 750 INJECTION, SOLUTION INTRAVENOUS at 03:03

## 2019-01-01 RX ADMIN — PREDNISONE 10 MG: 10 TABLET ORAL at 01:06

## 2019-01-01 RX ADMIN — INSULIN ASPART 6 UNITS: 100 INJECTION, SOLUTION INTRAVENOUS; SUBCUTANEOUS at 06:07

## 2019-01-01 RX ADMIN — DEXMEDETOMIDINE HYDROCHLORIDE 1.4 MCG/KG/HR: 4 INJECTION, SOLUTION INTRAVENOUS at 02:08

## 2019-01-01 RX ADMIN — DEXMEDETOMIDINE HYDROCHLORIDE 0.8 MCG/KG/HR: 4 INJECTION, SOLUTION INTRAVENOUS at 06:08

## 2019-01-01 RX ADMIN — SODIUM CHLORIDE 300 ML: 0.9 INJECTION, SOLUTION INTRAVENOUS at 10:06

## 2019-01-01 RX ADMIN — Medication 10 ML: at 09:06

## 2019-01-01 RX ADMIN — EPOETIN ALFA-EPBX 5400 UNITS: 10000 INJECTION, SOLUTION INTRAVENOUS; SUBCUTANEOUS at 03:07

## 2019-01-01 RX ADMIN — CLOPIDOGREL 75 MG: 75 TABLET, FILM COATED ORAL at 11:06

## 2019-01-01 RX ADMIN — NORTRIPTYLINE HYDROCHLORIDE 25 MG: 25 CAPSULE ORAL at 04:03

## 2019-01-01 RX ADMIN — INSULIN ASPART 1 UNITS: 100 INJECTION, SOLUTION INTRAVENOUS; SUBCUTANEOUS at 05:08

## 2019-01-01 RX ADMIN — INSULIN ASPART 1 UNITS: 100 INJECTION, SOLUTION INTRAVENOUS; SUBCUTANEOUS at 12:08

## 2019-01-01 RX ADMIN — DOXYCYCLINE HYCLATE 100 MG: 100 TABLET, COATED ORAL at 09:03

## 2019-01-01 RX ADMIN — INSULIN ASPART 2 UNITS: 100 INJECTION, SOLUTION INTRAVENOUS; SUBCUTANEOUS at 11:08

## 2019-01-01 RX ADMIN — SEVELAMER CARBONATE 800 MG: 800 TABLET, FILM COATED ORAL at 11:07

## 2019-01-01 RX ADMIN — Medication 0.19 MCG/KG/MIN: at 10:08

## 2019-01-01 RX ADMIN — VANCOMYCIN HYDROCHLORIDE 1750 MG: 100 INJECTION, POWDER, LYOPHILIZED, FOR SOLUTION INTRAVENOUS at 01:05

## 2019-01-01 RX ADMIN — SODIUM PHOSPHATE, MONOBASIC, MONOHYDRATE 39.99 MMOL: 276; 142 INJECTION, SOLUTION INTRAVENOUS at 02:08

## 2019-01-01 RX ADMIN — SEVELAMER CARBONATE 1600 MG: 800 TABLET, FILM COATED ORAL at 07:06

## 2019-01-01 RX ADMIN — LEVETIRACETAM 500 MG: 500 TABLET, FILM COATED ORAL at 11:06

## 2019-01-01 RX ADMIN — DEXMEDETOMIDINE HYDROCHLORIDE 0.4 MCG/KG/HR: 4 INJECTION, SOLUTION INTRAVENOUS at 07:08

## 2019-01-01 RX ADMIN — SODIUM CHLORIDE 100 ML/HR: 0.9 INJECTION, SOLUTION INTRAVENOUS at 04:06

## 2019-01-01 RX ADMIN — NOREPINEPHRINE BITARTRATE 0.4 MCG/KG/MIN: 1 INJECTION, SOLUTION, CONCENTRATE INTRAVENOUS at 08:08

## 2019-01-01 RX ADMIN — LEVETIRACETAM 500 MG: 5 INJECTION INTRAVENOUS at 09:06

## 2019-01-01 RX ADMIN — IPRATROPIUM BROMIDE AND ALBUTEROL SULFATE 3 ML: .5; 3 SOLUTION RESPIRATORY (INHALATION) at 01:05

## 2019-01-01 RX ADMIN — SEVELAMER CARBONATE 800 MG: 800 TABLET, FILM COATED ORAL at 01:05

## 2019-01-01 RX ADMIN — MICONAZOLE NITRATE: 20 POWDER TOPICAL at 11:06

## 2019-01-01 RX ADMIN — INSULIN ASPART 1 UNITS: 100 INJECTION, SOLUTION INTRAVENOUS; SUBCUTANEOUS at 10:06

## 2019-01-01 RX ADMIN — LIDOCAINE HYDROCHLORIDE 10 ML: 10 INJECTION, SOLUTION INFILTRATION; PERINEURAL at 02:08

## 2019-01-01 RX ADMIN — SODIUM POLYSTYRENE SULFONATE 15 G: 1 POWDER ORAL; RECTAL at 08:05

## 2019-01-01 RX ADMIN — DEXMEDETOMIDINE HYDROCHLORIDE 0.7 MCG/KG/HR: 4 INJECTION, SOLUTION INTRAVENOUS at 02:08

## 2019-01-01 RX ADMIN — CLOPIDOGREL 75 MG: 75 TABLET, FILM COATED ORAL at 07:05

## 2019-01-01 RX ADMIN — ACETAMINOPHEN 650 MG: 325 TABLET ORAL at 10:06

## 2019-01-01 RX ADMIN — EPOETIN ALFA-EPBX 5500 UNITS: 10000 INJECTION, SOLUTION INTRAVENOUS; SUBCUTANEOUS at 08:08

## 2019-01-01 RX ADMIN — DEXMEDETOMIDINE HYDROCHLORIDE 1 MCG/KG/HR: 4 INJECTION, SOLUTION INTRAVENOUS at 09:08

## 2019-01-01 RX ADMIN — FENTANYL CITRATE 50 MCG: 50 INJECTION, SOLUTION INTRAMUSCULAR; INTRAVENOUS at 03:08

## 2019-01-01 RX ADMIN — INSULIN ASPART 2 UNITS: 100 INJECTION, SOLUTION INTRAVENOUS; SUBCUTANEOUS at 09:05

## 2019-01-01 RX ADMIN — PARICALCITOL 4.5 MCG: 5 INJECTION, SOLUTION INTRAVENOUS at 10:07

## 2019-01-01 RX ADMIN — BENZONATATE 200 MG: 100 CAPSULE ORAL at 01:03

## 2019-01-01 RX ADMIN — DESMOPRESSIN ACETATE 33 MCG: 4 SOLUTION INTRAVENOUS at 02:07

## 2019-01-01 RX ADMIN — DEXMEDETOMIDINE HYDROCHLORIDE 1.2 MCG/KG/HR: 4 INJECTION, SOLUTION INTRAVENOUS at 01:08

## 2019-01-01 RX ADMIN — MUPIROCIN: 20 OINTMENT TOPICAL at 10:04

## 2019-01-01 RX ADMIN — PARICALCITOL 4.5 MCG: 5 INJECTION, SOLUTION INTRAVENOUS at 07:07

## 2019-01-01 RX ADMIN — FENTANYL CITRATE 100 MCG: 50 INJECTION INTRAMUSCULAR; INTRAVENOUS at 11:08

## 2019-01-01 RX ADMIN — DEXMEDETOMIDINE HYDROCHLORIDE 1.4 MCG/KG/HR: 4 INJECTION, SOLUTION INTRAVENOUS at 10:08

## 2019-01-01 RX ADMIN — MIDODRINE HYDROCHLORIDE 5 MG: 5 TABLET ORAL at 02:03

## 2019-01-01 RX ADMIN — MIDODRINE HYDROCHLORIDE 15 MG: 5 TABLET ORAL at 11:03

## 2019-01-01 RX ADMIN — WARFARIN SODIUM 5 MG: 2.5 TABLET ORAL at 05:07

## 2019-01-01 RX ADMIN — FLUDROCORTISONE ACETATE 200 MCG: 0.1 TABLET ORAL at 02:07

## 2019-01-01 RX ADMIN — SODIUM CHLORIDE: 0.9 INJECTION, SOLUTION INTRAVENOUS at 03:06

## 2019-01-01 RX ADMIN — MEROPENEM 2 G: 1 INJECTION, POWDER, FOR SOLUTION INTRAVENOUS at 06:08

## 2019-01-01 RX ADMIN — Medication 0.06 MCG/KG/MIN: at 08:08

## 2019-01-02 NOTE — TELEPHONE ENCOUNTER
Called patient to confirm appt on 1/4/2019 with Dr Yoo at [facility]. Patient verbalized understanding of appt time and location.

## 2019-01-04 PROBLEM — B35.1 ONYCHOMYCOSIS: Status: ACTIVE | Noted: 2019-01-01

## 2019-01-04 NOTE — PROGRESS NOTES
Sisi Medel  01/04/2018    HPI:  Patient is a 55 y.o.  female with a h/o HTN, HLD, CHF (EF 20%), COPD on home O2, paroxysmal Afib (on Coumadin), seizure disorder, ESRD on HD TThS via LUE AVG (placed 2/3/16) who presents to clinic today for follow up. She was last seen in our clinic in 10/2018, and  underwent fistulagram with PTA of distal anastomosis and venous outflow in 08/2018.  She has done well at HD.  She also recently fell and lacerated her R leg, which has been improving but draining some serosanguinous fluid.     Patient has been having pain x 2-3 weeks from the right dorsal foot to lower calf, some pain in left ankle as well. She also has numbness and . She has some rest pain, and resting her legs does not help. She's noticed hyperpigmentation and ulceration as well on the right lower calf. Symptoms are worsening.    Patient gets HD via LUE AVG. Per patient, the venous pressure is frequently elevated. She does not have issues with bleeding or accessing the graft.     Patient takes ASA, statin, Plavix, coumadin.    S/p   2/3/16: LUE AVG creation (Dr Villafana)  6/21/16: Percutaneous mechanical thrombectomy w Possis Angiojet AVX; 4fr OTW embolectomy of arterial inflow   PTA outflow stenosis with a 7x40 mm balloon  10/12/16: fistulogram (75% stenosis noted), left upper extremity AV graft PTA venous outflow with resolution of stenosis noted  2/2017 Declot and venous outflow PTA and stent with 8 x 50 VIABAHN  5/15/2017: PTA outflow stenosis (8x60 mustang); Stent outflow stenosis (8x50 Viabahn)  10/12/17: PTA LUE AV graft (brachial vein) x2: (7x60 Grimes); (8x40 Burnett)   01/10/18: PTA outflow stensois (8x40 Burnett) and (9x40 Burnett)   2/2/18: PTA arterial inflow (7 x 40 Burnett)  8/2018: 1.  PTA outflow stenosis 8 x 40 Burnett; PTA outflow 10 x 40 Burnett balloon.    No MI/stroke  Tobacco use: Former smoker     Past Medical History   Diagnosis Date    Anemia in ESRD (end-stage renal  disease) 3/7/2016    Cervical radiculopathy 2015    Chronic combined systolic and diastolic heart failure 2013     EF 20% , improved with Medical therapy, negative PET     Chronic respiratory failure with hypoxia 2013     On home oxygen at 2-5 liters    Chronic rhinitis 10/2/2013    DDD (degenerative disc disease), lumbar 2015    ESRD on hemodialysis 2016    Essential hypertension     Gout     Hyperlipidemia 3/7/2016    Lateral meniscal tear 2016    Lumbar stenosis 2015    Morbid obesity 8/15/2013    Paroxysmal atrial fibrillation 2014     Not on anticoagulation    Peripheral neuropathy 2013    Personal history of fall 2014    Personal history of gastric ulcer 3/19/2013    Sarcoidosis, lung 2009     Diagnosed in  with ocular manifestation, on 4L home O2 and PO steroids    Secondary pulmonary hypertension 2015    Seizure disorder 3/19/2013    Shingles 2013    Spondylarthrosis 2015     Past Surgical History   Procedure Laterality Date     section, classic      Abdominal surgery      Vascular surgery       Family History   Problem Relation Age of Onset    Kidney failure Mother     Hypertension Mother     Diabetes Mother     Coronary artery disease Father     Hypertension Father     Diabetes Father     Lupus Sister     Diabetes Maternal Grandmother     Asthma Neg Hx     Emphysema Neg Hx     Melanoma Neg Hx     Psoriasis Neg Hx      Social History     Social History    Marital status: Single     Spouse name: N/A    Number of children: N/A    Years of education: N/A     Occupational History    Not on file.     Social History Main Topics    Smoking status: Former Smoker     Packs/day: 0.50     Years: 10.00     Types: Cigarettes     Quit date: 1994    Smokeless tobacco: Never Used    Alcohol use No    Drug use: No    Sexual activity: No     Other Topics Concern    Not on file     Social  History Narrative    Lives with boyfriend/partner who helps with her care.     Plans to travel to [place] for 2 weeks in 9/2016     Current Outpatient Prescriptions on File Prior to Visit   Medication Sig    ACZONE 5 % topical gel Apply topically once daily.     allopurinol (ZYLOPRIM) 100 MG tablet Take 1 tablet (100 mg total) by mouth once daily.    amiodarone (PACERONE) 200 MG Tab Take 1 tablet (200 mg total) by mouth every morning.    ammonium lactate (LAC-HYDRIN) 12 % lotion Apply topically as needed (for scars on arms).     aspirin 81 MG Chew Take 81 mg by mouth every morning.    atorvastatin (LIPITOR) 80 MG tablet Take 80 mg by mouth once daily.     capsicum 0.075% (ZOSTRIX) 0.075 % topical cream Apply topically 3 (three) times daily. For neuropathic pain of feet    gabapentin (NEURONTIN) 100 MG capsule TAKE ONE CAPSULE BY MOUTH THREE TIMES DAILY (Patient taking differently: TAKE ONE CAPSULE BY MOUTH THREE TIMES DAILY-noon, evening, bedtime)    levetiracetam (KEPPRA) 500 MG Tab TAKE 1 TABLET BY MOUTH TWICE DAILY.    melatonin 5 mg Tab Take 1 tablet by mouth nightly.    midodrine (PROAMATINE) 10 MG tablet     midodrine (PROAMATINE) 5 MG Tab     NEPHRO-LINDA 0.8 mg Tab TK 1 T PO  QD    ondansetron (ZOFRAN-ODT) 8 MG TbDL Take 1 tablet (8 mg total) by mouth every 8 (eight) hours as needed.    oxycodone-acetaminophen (PERCOCET) 7.5-325 mg per tablet Take 1 tablet by mouth every 4 (four) hours as needed.    predniSONE (DELTASONE) 10 MG tablet TAKE 3 TABLETS BY MOUTH FOR 7 DAYS, THEN 2 TABLETS FOR 7 DAYS, THEN 1 TABLET EVERY DAY AFTER.    RENVELA 800 mg Tab TAKE 1 TABLET BY MOUTH THREE TIMES DAILY WITH MEALS    tizanidine 4 mg Cap Take 4 mg by mouth 2 (two) times daily as needed. (Patient taking differently: Take 4 mg by mouth daily as needed (for muscle spasms.). )    warfarin (COUMADIN) 5 MG tablet TAKE 1 TABLET(5 MG) BY MOUTH DAILY     No current facility-administered medications on file prior to  visit.        REVIEW OF SYSTEMS:  General: negative; ENT: negative; Allergy and Immunology: negative; Hematological and Lymphatic: Negative; Endocrine: negative; Respiratory: no cough, shortness of breath, or wheezing; Cardiovascular: no chest pain or dyspnea on exertion; Gastrointestinal: no abdominal pain/back, change in bowel habits, or bloody stools; Genito-Urinary: no dysuria, trouble voiding, or hematuria; Musculoskeletal: see HPI  Neurological: no TIA or stroke symptoms; Psychiatric: no nervousness, anxiety or depression.    PHYSICAL EXAM:   Vitals:    01/04/19 1311   BP: 119/74   Pulse: 70   Temp: 98.1 °F (36.7 °C)         General appearance:  Alert, well-appearing, and in no distress.  Oriented to person, place, and time   Neurological:  Normal speech, no focal findings noted; CN II - XII grossly intact           Musculoskeletal: Digits/nail without cyanosis/clubbing.  Normal muscle strength/tone.                 Neck: Supple, no significant adenopathy; thyroid is not enlarged                Chest:  Clear to auscultation, symmetric air entry      No use of accessory muscles             Cardiac: Normal rate and regular rhythm, S1 and S2 normal; PMI non-displaced          Abdomen: Soft, non-tender, non-distended, no masses or organomegaly      Extremities:   LUE AVG with no palpable thrill, pulsatile, 2+ distal radial pulse;       RLE with hyperpigmentation, induration from distal calf to proximal foot, 2 x 2 cm superficial venous ulceration with mild serous exudate;       Biphasic R pedal signals    LAB RESULTS:  Lab Results   Component Value Date    WBC 10.54 12/30/2018    HGB 11.7 (L) 12/30/2018    HCT 39.4 12/30/2018    MCV 93 12/30/2018     12/30/2018     BMP  Lab Results   Component Value Date     12/31/2018    K 4.9 12/31/2018     12/31/2018    CO2 23 12/31/2018    BUN 53 (H) 12/31/2018    CREATININE 7.8 (H) 12/31/2018    CALCIUM 8.3 (L) 12/31/2018    ANIONGAP 16 12/31/2018     ESTGFRAFRICA 6.0 (A) 2018    EGFRNONAA 5.2 (A) 2018     Lab Results   Component Value Date    HGBA1C 6.7 (H) 10/24/2018       IMAGIN/4/19 VASU - 1.31 R, 1.31 L; PVR waveforms suggest minimal peripheral arterial occlusive disease    HD Duplex 10/2018  Inflow-123  arterial anastomosis- 358  proximal graft- 173  mid graft- 172  distal graft- 141  venous anastomosis-130  venous outflow(stent)- 75  venous outflow(distal stent)- 119.     Overall Impression:  Color flow duplex exam reveals a patent left hemodialysis AV graft and outflow stents with no evidence of a hemodyanmically significant stenoisis.  Volume flow at mid graft level measures 1264 ml./min.    RLE Unna Boot:  Patient placed in the supine position on the treatment table.  The coflex bandage was removed with scissors and the leg was cleansed with Easi-clense sponges and dried thoroughly.  Eucerin cream was applied to the lower right leg. Medihoney gel and a hydrofiber dressing was applied to the wound.  The patient's foot was positioned at a 90 degree angle.  The coflex was applied per package instructions.  A two layered application was performed using a spiral technique beginning with the foam layer followed by the cohesive bandage avoiding creases or folds.  The wrap was started behind the first metatarsal and ended below the tibial tubercle of the knee.  There was overlap of each turn half the width of the previous turn.  The compression wrap will be changed every 7 days.        IMP/PLAN:  55 y.o. female with HTN, HLD, CHF (EF 20%), COPD on home O2, paroxysmal Afib (on Coumadin), seizure disorder, ESRD on HD TThS via LUE AVG. Small venous ulceration and stigmata of venous stasis RLE. R leg unna boot applied in clinic today.     1) Venous US of BLE  2) Repeat LUE AVG duplex today  3) continue coumadin  4) wound care referral  5) RTC 3 months or sooner if HD problems arise      Torrey Villafana MD  Vascular & Endovascular Surgery

## 2019-01-04 NOTE — TELEPHONE ENCOUNTER
----- Message from Syeda Clark sent at 1/4/2019  9:38 AM CST -----  Contact: Patient   Same Day Appointment Request    Was an appointment with another provider offered? Yes     Reason for FST appt.: Patient states that she is having a skin issue/ rash on her hand. States that she needs an appt asap.     Communication Preference: [phone]

## 2019-01-04 NOTE — PROGRESS NOTES
Patient Sisi Medel, MRN [id number], was dependent on dialysis (ICD10 Z99.2) at the time of this visit on 1/4/19. This addendum is made to the medical record on 01/04/2019.

## 2019-01-04 NOTE — H&P (VIEW-ONLY)
Sisi Medel  01/04/2018    HPI:  Patient is a 55 y.o.  female with a h/o HTN, HLD, CHF (EF 20%), COPD on home O2, paroxysmal Afib (on Coumadin), seizure disorder, ESRD on HD TThS via LUE AVG (placed 2/3/16) who presents to clinic today for follow up. She was last seen in our clinic in 10/2018, and  underwent fistulagram with PTA of distal anastomosis and venous outflow in 08/2018.  She has done well at HD.  She also recently fell and lacerated her R leg, which has been improving but draining some serosanguinous fluid.     Patient has been having pain x 2-3 weeks from the right dorsal foot to lower calf, some pain in left ankle as well. She also has numbness and . She has some rest pain, and resting her legs does not help. She's noticed hyperpigmentation and ulceration as well on the right lower calf. Symptoms are worsening.    Patient gets HD via LUE AVG. Per patient, the venous pressure is frequently elevated. She does not have issues with bleeding or accessing the graft.     Patient takes ASA, statin, Plavix, coumadin.    S/p   2/3/16: LUE AVG creation (Dr Villafana)  6/21/16: Percutaneous mechanical thrombectomy w Possis Angiojet AVX; 4fr OTW embolectomy of arterial inflow   PTA outflow stenosis with a 7x40 mm balloon  10/12/16: fistulogram (75% stenosis noted), left upper extremity AV graft PTA venous outflow with resolution of stenosis noted  2/2017 Declot and venous outflow PTA and stent with 8 x 50 VIABAHN  5/15/2017: PTA outflow stenosis (8x60 mustang); Stent outflow stenosis (8x50 Viabahn)  10/12/17: PTA LUE AV graft (brachial vein) x2: (7x60 Elberta); (8x40 Dunn)   01/10/18: PTA outflow stensois (8x40 Dunn) and (9x40 Dunn)   2/2/18: PTA arterial inflow (7 x 40 Dunn)  8/2018: 1.  PTA outflow stenosis 8 x 40 Dunn; PTA outflow 10 x 40 Dunn balloon.    No MI/stroke  Tobacco use: Former smoker     Past Medical History   Diagnosis Date    Anemia in ESRD (end-stage renal  disease) 3/7/2016    Cervical radiculopathy 2015    Chronic combined systolic and diastolic heart failure 2013     EF 20% , improved with Medical therapy, negative PET     Chronic respiratory failure with hypoxia 2013     On home oxygen at 2-5 liters    Chronic rhinitis 10/2/2013    DDD (degenerative disc disease), lumbar 2015    ESRD on hemodialysis 2016    Essential hypertension     Gout     Hyperlipidemia 3/7/2016    Lateral meniscal tear 2016    Lumbar stenosis 2015    Morbid obesity 8/15/2013    Paroxysmal atrial fibrillation 2014     Not on anticoagulation    Peripheral neuropathy 2013    Personal history of fall 2014    Personal history of gastric ulcer 3/19/2013    Sarcoidosis, lung 2009     Diagnosed in  with ocular manifestation, on 4L home O2 and PO steroids    Secondary pulmonary hypertension 2015    Seizure disorder 3/19/2013    Shingles 2013    Spondylarthrosis 2015     Past Surgical History   Procedure Laterality Date     section, classic      Abdominal surgery      Vascular surgery       Family History   Problem Relation Age of Onset    Kidney failure Mother     Hypertension Mother     Diabetes Mother     Coronary artery disease Father     Hypertension Father     Diabetes Father     Lupus Sister     Diabetes Maternal Grandmother     Asthma Neg Hx     Emphysema Neg Hx     Melanoma Neg Hx     Psoriasis Neg Hx      Social History     Social History    Marital status: Single     Spouse name: N/A    Number of children: N/A    Years of education: N/A     Occupational History    Not on file.     Social History Main Topics    Smoking status: Former Smoker     Packs/day: 0.50     Years: 10.00     Types: Cigarettes     Quit date: 1994    Smokeless tobacco: Never Used    Alcohol use No    Drug use: No    Sexual activity: No     Other Topics Concern    Not on file     Social  History Narrative    Lives with boyfriend/partner who helps with her care.     Plans to travel to [place] for 2 weeks in 9/2016     Current Outpatient Prescriptions on File Prior to Visit   Medication Sig    ACZONE 5 % topical gel Apply topically once daily.     allopurinol (ZYLOPRIM) 100 MG tablet Take 1 tablet (100 mg total) by mouth once daily.    amiodarone (PACERONE) 200 MG Tab Take 1 tablet (200 mg total) by mouth every morning.    ammonium lactate (LAC-HYDRIN) 12 % lotion Apply topically as needed (for scars on arms).     aspirin 81 MG Chew Take 81 mg by mouth every morning.    atorvastatin (LIPITOR) 80 MG tablet Take 80 mg by mouth once daily.     capsicum 0.075% (ZOSTRIX) 0.075 % topical cream Apply topically 3 (three) times daily. For neuropathic pain of feet    gabapentin (NEURONTIN) 100 MG capsule TAKE ONE CAPSULE BY MOUTH THREE TIMES DAILY (Patient taking differently: TAKE ONE CAPSULE BY MOUTH THREE TIMES DAILY-noon, evening, bedtime)    levetiracetam (KEPPRA) 500 MG Tab TAKE 1 TABLET BY MOUTH TWICE DAILY.    melatonin 5 mg Tab Take 1 tablet by mouth nightly.    midodrine (PROAMATINE) 10 MG tablet     midodrine (PROAMATINE) 5 MG Tab     NEPHRO-LINDA 0.8 mg Tab TK 1 T PO  QD    ondansetron (ZOFRAN-ODT) 8 MG TbDL Take 1 tablet (8 mg total) by mouth every 8 (eight) hours as needed.    oxycodone-acetaminophen (PERCOCET) 7.5-325 mg per tablet Take 1 tablet by mouth every 4 (four) hours as needed.    predniSONE (DELTASONE) 10 MG tablet TAKE 3 TABLETS BY MOUTH FOR 7 DAYS, THEN 2 TABLETS FOR 7 DAYS, THEN 1 TABLET EVERY DAY AFTER.    RENVELA 800 mg Tab TAKE 1 TABLET BY MOUTH THREE TIMES DAILY WITH MEALS    tizanidine 4 mg Cap Take 4 mg by mouth 2 (two) times daily as needed. (Patient taking differently: Take 4 mg by mouth daily as needed (for muscle spasms.). )    warfarin (COUMADIN) 5 MG tablet TAKE 1 TABLET(5 MG) BY MOUTH DAILY     No current facility-administered medications on file prior to  visit.        REVIEW OF SYSTEMS:  General: negative; ENT: negative; Allergy and Immunology: negative; Hematological and Lymphatic: Negative; Endocrine: negative; Respiratory: no cough, shortness of breath, or wheezing; Cardiovascular: no chest pain or dyspnea on exertion; Gastrointestinal: no abdominal pain/back, change in bowel habits, or bloody stools; Genito-Urinary: no dysuria, trouble voiding, or hematuria; Musculoskeletal: see HPI  Neurological: no TIA or stroke symptoms; Psychiatric: no nervousness, anxiety or depression.    PHYSICAL EXAM:   Vitals:    01/04/19 1311   BP: 119/74   Pulse: 70   Temp: 98.1 °F (36.7 °C)         General appearance:  Alert, well-appearing, and in no distress.  Oriented to person, place, and time   Neurological:  Normal speech, no focal findings noted; CN II - XII grossly intact           Musculoskeletal: Digits/nail without cyanosis/clubbing.  Normal muscle strength/tone.                 Neck: Supple, no significant adenopathy; thyroid is not enlarged                Chest:  Clear to auscultation, symmetric air entry      No use of accessory muscles             Cardiac: Normal rate and regular rhythm, S1 and S2 normal; PMI non-displaced          Abdomen: Soft, non-tender, non-distended, no masses or organomegaly      Extremities:   LUE AVG with no palpable thrill, pulsatile, 2+ distal radial pulse;       RLE with hyperpigmentation, induration from distal calf to proximal foot, 2 x 2 cm superficial venous ulceration with mild serous exudate;       Biphasic R pedal signals    LAB RESULTS:  Lab Results   Component Value Date    WBC 10.54 12/30/2018    HGB 11.7 (L) 12/30/2018    HCT 39.4 12/30/2018    MCV 93 12/30/2018     12/30/2018     BMP  Lab Results   Component Value Date     12/31/2018    K 4.9 12/31/2018     12/31/2018    CO2 23 12/31/2018    BUN 53 (H) 12/31/2018    CREATININE 7.8 (H) 12/31/2018    CALCIUM 8.3 (L) 12/31/2018    ANIONGAP 16 12/31/2018     ESTGFRAFRICA 6.0 (A) 2018    EGFRNONAA 5.2 (A) 2018     Lab Results   Component Value Date    HGBA1C 6.7 (H) 10/24/2018       IMAGIN/4/19 VASU - 1.31 R, 1.31 L; PVR waveforms suggest minimal peripheral arterial occlusive disease    HD Duplex 10/2018  Inflow-123  arterial anastomosis- 358  proximal graft- 173  mid graft- 172  distal graft- 141  venous anastomosis-130  venous outflow(stent)- 75  venous outflow(distal stent)- 119.     Overall Impression:  Color flow duplex exam reveals a patent left hemodialysis AV graft and outflow stents with no evidence of a hemodyanmically significant stenoisis.  Volume flow at mid graft level measures 1264 ml./min.    RLE Unna Boot:  Patient placed in the supine position on the treatment table.  The coflex bandage was removed with scissors and the leg was cleansed with Easi-clense sponges and dried thoroughly.  Eucerin cream was applied to the lower right leg. Medihoney gel and a hydrofiber dressing was applied to the wound.  The patient's foot was positioned at a 90 degree angle.  The coflex was applied per package instructions.  A two layered application was performed using a spiral technique beginning with the foam layer followed by the cohesive bandage avoiding creases or folds.  The wrap was started behind the first metatarsal and ended below the tibial tubercle of the knee.  There was overlap of each turn half the width of the previous turn.  The compression wrap will be changed every 7 days.        IMP/PLAN:  55 y.o. female with HTN, HLD, CHF (EF 20%), COPD on home O2, paroxysmal Afib (on Coumadin), seizure disorder, ESRD on HD TThS via LUE AVG. Small venous ulceration and stigmata of venous stasis RLE. R leg unna boot applied in clinic today.     1) Venous US of BLE  2) Repeat LUE AVG duplex today  3) continue coumadin  4) wound care referral  5) RTC 3 months or sooner if HD problems arise      Torrey Villafana MD  Vascular & Endovascular Surgery

## 2019-01-04 NOTE — PROGRESS NOTES
[facility]  History & Physical  Orthopedics    SUBJECTIVE:      Chief Complaint: right hand numbness    Referring Provider: KIET Mane      History of Present Illness:  Patient is a 58 y.o. right hand dominant female who presents today with complaints of right hand numbness for the past year. She has been followed by Syeda Becker who ordered an EMG which showed mild-moderate R CTS, mild L CTS. She has also been seen for a mass associated with her right long finger MCP joint. An ultrasound there showed a non-specific fluid collection. This has since resolved without intervention.  She reports that the numbness is present in her thumb at all times, but occasionally involves the entire hand. Symptoms are worst during the day and not particularly bothersome at night. She also has ulcerations in the 1st webspaces bilaterally which have been improving with Medihoney.     Significant comorbidities including chronic restrictive lung disease, ESRD on dialysis (AV fistula LUE), recent diagnosis of DM (last Hgb A1c 6.7), Afib, adrenal insufficiency, gout    Interval history 1/4/19: Pt returns today reporting her numbness has improved and is basically resolved.  She does report that her fingernails have been spontaneously falling off and becoming discolored, however, and this is her main concern today.  She presents for reevaluation.    The patient is disabled.    Onset of symptoms/DOI was about a year ago.    Symptoms are aggravated by activity and during the day.    Symptoms are better at night.    Symptoms consist of pain, numbness.    The patient rates their pain as a 0/10.    Attempted treatment(s) and/or interventions include no previous treatment.     The patient denies any fevers, chills, N/V, D/C and presents for evaluation.       Past Medical History:   Diagnosis Date    Anemia in ESRD (end-stage renal disease) 3/7/2016    Anticoagulant long-term use     Cervical radiculopathy  2015    CHF (congestive heart failure)     Chronic combined systolic and diastolic heart failure 2013    EF 20% , improved with Medical therapy, negative PET     Chronic respiratory failure with hypoxia 2013    On home oxygen at 2-5 liters    Closed fracture of proximal end of right fibula 2016    Complications due to renal dialysis device, implant, and graft     DDD (degenerative disc disease), lumbar 2015    Dependence on renal dialysis     Diabetes mellitus type II, controlled 2017    ESRD on hemodialysis 2016    Essential hypertension     Gout     Lateral meniscal tear 2016    Lumbar stenosis 2015    Obesity, Class III, BMI 40-49.9 (morbid obesity)     Pacemaker     Paroxysmal atrial fibrillation 2014    Not on anticoagulation    Peripheral neuropathy 2013    Personal history of gastric ulcer 3/19/2013    Sarcoidosis, lung 2009    Diagnosed in  with ocular manifestation, on 4L home O2 and PO steroids    Secondary pulmonary hypertension 2015    Seizure disorder 3/19/2013    Shingles 2013    Thyroid disease      Past Surgical History:   Procedure Laterality Date    ABDOMINAL SURGERY      ABLATION N/A 2017    Performed by [facility]imir [facility] at [facility] CATH LAB    ANGIOPLASTY Left 1/10/2018    Performed by [facility] at [facility] OR 2ND FLR    ANGIOPLASTY-PERCUTANEOUS TRANSLUMINAL (PTA) Left 2017    Performed by [facility] at [facility] OR 2ND FLR    ANGIOPLASTY-PERCUTANEOUS TRANSLUMINAL (PTA) Left 10/12/2016    Performed by [facility] at [facility] OR 2ND FLR    CARDIAC PACEMAKER PLACEMENT       SECTION      x2    DECLOT GRAFT PERCUTANEOUS Left 2017    Performed by [facility] at [facility] OR 2ND FLR    DECLOT-GRAFT Left 2016    Performed by Harjit Starr MD at [facility] CATH LAB    DECLOT-GRAFT Left 2016    Performed by [facility]it Starr [facility] at [facility]  CATH LAB    ECHOCARDIOGRAM-TRANSESOPHAGEAL N/A 6/27/2013    Per[facility] by Del[facility]geon at [facility] DELMY    fistulogram Left 1/10/2018    [facility] by [facility] at [facility] OR 2ND FLR    FISTULOGRAM Left 9/13/2017    Performed by [facility] CATH LAB    FISTULOGRAM Left 10/12/2016    Performed by [facility] OR 2ND FLR    FISTULOGRAM Left 6/21/2016    Performed by [facility] at [facility] CATH LAB    INJECTION-STEROID-EPIDURAL-TRANSFORAMINAL Right 7/20/2015    Performed by [facility] at [facility] PAIN MGT    INJECTION-STEROID-EPIDURAL-TRANSFORAMINAL Right 5/21/2015    [facility] at [facility] MGT    [id number] / Left upper AV graft. Left 2/3/2016    [facility] by [facility] at [facility] OR 2ND FLR    [id number]-[id number] N/A 1/11/2017    Performed by Bl[facility]imir [facility]orno [facility] at [facility] CATH LAB    OTHER SURGICAL HISTORY      loop recorder implant    PLACEMENT-STENT Left 2/7/2017    [facility] by [facility] at [facility] OR 2ND FLR    stent to fistula      TRANSESOPHAGEAL ECHOCARDIOGRAM (JARAD) N/A 6/27/2016    Performed by Sourav Machuca [facility] at [facility] CATH LAB    ULTRASOUND, UPPER GI TRACT, ENDOSCOPIC N/A 1/9/2014    Performed by Stewart Burgess MD at [facility] ENDO (2ND FLR)    VASCULAR SURGERY      fistula to L upper arm      Review of patient's allergies indicates:   Allergen Reactions    Bactrim [sulfamethoxazole-trimethoprim] Other (See Comments)     Causes renal failure    Nsaids (non-steroidal anti-inflammatory drug) Other (See Comments)     Renal failure     Social History     Social History Narrative    Lives with boyfriend/partner who helps with her care.      Family History   Problem Relation Age of Onset    Kidney failure Mother     Hypertension Mother     Diabetes Mother     Coronary artery disease Father     Hypertension Father     Diabetes Father     Lupus Sister     Diabetes  Maternal Grandmother     Colon cancer Neg Hx     Ovarian cancer Neg Hx     Breast cancer Neg Hx          Current Outpatient Medications:     ACZONE 5 % topical gel, Apply to face every morning, Disp: 60 g, Rfl: 2    atorvastatin (LIPITOR) 80 MG tablet, TAKE 1 TABLET BY MOUTH EVERY EVENING, Disp: 90 tablet, Rfl: 0    betamethasone dipropionate (DIPROLENE) 0.05 % ointment, AAA bid hand under occlusion, Disp: 45 g, Rfl: 3    blood sugar diagnostic Strp, 1 each by Misc.(Non-Drug; Combo Route) route once daily., Disp: 100 each, Rfl: 3    blood-glucose meter kit, Use as instructed, Disp: 1 each, Rfl: 0    BOOSTRIX TDAP 2.5-8-5 Lf-mcg-Lf/0.5mL Syrg injection, ADM 0.5ML IM UTD, Disp: , Rfl: 0    cholecalciferol, vitamin D3, 1,000 unit capsule, Take 1 capsule (1,000 Units total) by mouth once daily. (Patient taking differently: Take 1,000 Units by mouth every evening. ), Disp: , Rfl: 0    clopidogrel (PLAVIX) 75 mg tablet, Take 1 tablet (75 mg total) by mouth once daily., Disp: 30 tablet, Rfl: 11    diclofenac sodium (SOLARAZE) 3 % gel, , Disp: , Rfl:     fludrocortisone (FLORINEF) 0.1 mg Tab, Take 100 mcg by mouth 2 (two) times daily., Disp: , Rfl:     gabapentin (NEURONTIN) 600 MG tablet, TAKE 1 TABLET(600 MG) BY MOUTH TWICE DAILY, Disp: 180 tablet, Rfl: 1    ketorolac 0.5% (ACULAR) 0.5 % Drop, instill one drop into both eyes every morning, Disp: , Rfl: 3    lancets Misc, 1 each by Misc.(Non-Drug; Combo Route) route once daily., Disp: 100 each, Rfl: 3    lancing device Misc, 1 Device by Misc.(Non-Drug; Combo Route) route 2 (two) times daily with meals., Disp: 1 each, Rfl: 0    levETIRAcetam (KEPPRA) 500 MG Tab, Take 1 tablet (500 mg total) by mouth 2 (two) times daily., Disp: 180 tablet, Rfl: 3    linagliptin (TRADJENTA) 5 mg Tab tablet, Take 1 tablet (5 mg total) by mouth once daily., Disp: 90 tablet, Rfl: 1    midodrine (PROAMATINE) 5 MG Tab, , Disp: , Rfl:     mupirocin (BACTROBAN) 2 % ointment,  "Apply topically once daily. To areas of infection/inflammation on leg, Disp: 30 g, Rfl: 0    omeprazole (PRILOSEC) 20 MG capsule, TAKE 1 CAPSULE BY MOUTH EVERY DAY, Disp: 90 capsule, Rfl: 4    polyethylene glycol (GLYCOLAX) 17 gram/dose powder, Take 17 g by mouth once daily. Dissolve in juice., Disp: 510 g, Rfl: 0    prednisoLONE acetate (PRED FORTE) 1 % DrpS, INSTILL ONE DROP INTO  LEFT EYE THREE TIMES A DAY, Disp: , Rfl: 3    pregabalin (LYRICA) 100 MG capsule, Take 1 capsule (100 mg total) by mouth 2 (two) times daily., Disp: 180 capsule, Rfl: 1    PREVNAR 13, PF, 0.5 mL Syrg, ADM 0.5ML IM UTD, Disp: , Rfl: 0    DENISE-LINDA 0.8 mg Tab, TK 1 T PO QD, Disp: , Rfl: 0    RENVELA 800 mg Tab, TAKE 1 TABLET BY MOUTH THREE TIMES DAILY WITH MEALS, Disp: 90 tablet, Rfl: 0    sodium polystyrene (KAYEXALATE) 15 gram/60 mL Susp, Take 60 mLs (15 g total) by mouth daily as needed. Take when directed by your doctor, Disp: 60 mL, Rfl: 11    tiZANidine (ZANAFLEX) 4 MG tablet, TAKE 1 TABLET BY MOUTH EVERY DAY AS NEEDED FOR MUSCLE SPASMS, Disp: 90 tablet, Rfl: 0    warfarin (COUMADIN) 5 MG tablet, Take 1 tablet (5 mg total) by mouth Daily. Per [facility] (Patient taking differently: Take 5 mg by mouth every evening. Per [facility]), Disp: 90 tablet, Rfl: 3      Review of Systems:  Constitutional: no fever or chills  Eyes: no visual changes  ENT: no nasal congestion or sore throat  Respiratory: no cough or shortness of breath  Cardiovascular: no chest pain  Gastrointestinal: no nausea or vomiting, tolerating diet  Musculoskeletal: pain and numbness/tingling    OBJECTIVE:      Vital Signs (Most Recent):  Vitals:    01/04/19 0904   BP: 101/70   BP Location: Right arm   Patient Position: Sitting   BP Method: X-Large (Automatic)   Pulse: 72   Weight: 121.6 kg (268 lb 1.3 oz)   Height: 5' 8" (1.727 m)     Body mass index is 40.76 kg/m².      Physical Exam:  Constitutional: The patient appears well-developed and " well-nourished. No distress.   Head: Normocephalic and atraumatic.   Nose: Nose normal.   Eyes: Conjunctivae and EOM are normal.   Neck: No tracheal deviation present.   Cardiovascular: Normal rate and intact distal pulses.    Pulmonary/Chest: Effort normal. No respiratory distress.   Abdominal: There is no guarding.   Neurological: The patient is alert.   Psychiatric: The patient has a normal mood and affect.     Right Hand/Wrist Examination:    Observation/Inspection:  Swelling  none    Deformity  none  Discoloration  Dark discoloration around site of fissuring in 1st web space  Scars   none    Atrophy  None  Several missing fingernails, no sign of infection  Fingernails remaining are discolored    HAND/WRIST EXAMINATION:  Finkelstein's Test   Neg  Snuff box tenderness   Neg  Fay's Test    Neg  Hook of Hamate Tenderness  Neg  CMC grind    Neg  Circumduction test   Neg    Neurovascular Exam:  Digits WWP, brisk CR < 3s throughout  NVI motor/LTS to M/R/U nerves, radial pulse 2+  Decreased sensation right thumb and long fingers  Tinel's Test - Carpal Tunnel  Neg  Tinel's Test - Cubital Tunnel  Neg  Phalen's Test    Neg  Median Nerve Compression Test Neg    ROM hand/wrist/elbow full, painless    Diagnostic Results:     US right hand - non-specific fluid collection associated with right long finger MCPJ  EMG - mild-mod R CTS, mild L CTS    ASSESSMENT/PLAN:      58 y.o. yo female with b/l CTS  1) Continue braces  2) at this time, CT symptoms are minimal  3) I will refer the patient to derm for evaluation of onychomycosis   4) Follow up if CT symptoms return        Jaden Yoo M.D.

## 2019-01-04 NOTE — TELEPHONE ENCOUNTER
Spoke to pt.pt states her rash is flaring and insisted to be seen asap, luis appt on 01/07/19.Confirmed date and time over the phone.

## 2019-01-07 ENCOUNTER — APPOINTMENT (RX ONLY)
Dept: URBAN - METROPOLITAN AREA CLINIC 98 | Facility: CLINIC | Age: 59
Setting detail: DERMATOLOGY
End: 2019-01-07

## 2019-01-07 DIAGNOSIS — L60.9 NAIL DISORDER, UNSPECIFIED: ICD-10-CM

## 2019-01-07 PROBLEM — I10 ESSENTIAL (PRIMARY) HYPERTENSION: Status: ACTIVE | Noted: 2019-01-07

## 2019-01-07 PROBLEM — M12.9 ARTHROPATHY, UNSPECIFIED: Status: ACTIVE | Noted: 2019-01-07

## 2019-01-07 PROBLEM — G40.909 EPILEPSY, UNSPECIFIED, NOT INTRACTABLE, WITHOUT STATUS EPILEPTICUS: Status: ACTIVE | Noted: 2019-01-07

## 2019-01-07 PROBLEM — E13.9 OTHER SPECIFIED DIABETES MELLITUS WITHOUT COMPLICATIONS: Status: ACTIVE | Noted: 2019-01-07

## 2019-01-07 PROCEDURE — ? NAIL CLIPPING FOR PAS

## 2019-01-07 PROCEDURE — 99202 OFFICE O/P NEW SF 15 MIN: CPT

## 2019-01-07 PROCEDURE — ? TREATMENT REGIMEN

## 2019-01-07 PROCEDURE — ? COUNSELING

## 2019-01-07 ASSESSMENT — LOCATION DETAILED DESCRIPTION DERM: LOCATION DETAILED: LEFT THUMBNAIL

## 2019-01-07 ASSESSMENT — LOCATION ZONE DERM: LOCATION ZONE: FINGERNAIL

## 2019-01-07 ASSESSMENT — LOCATION SIMPLE DESCRIPTION DERM: LOCATION SIMPLE: LEFT THUMBNAIL

## 2019-01-07 NOTE — PROCEDURE: TREATMENT REGIMEN
Plan: All fingernails trimmed back to points of proximal insertion in clinic today.\\nIf PAS positive for fungus, consider course of antifungal therapy (ideally oral, if safe with pt's other meds)
Detail Level: Zone
Otc Regimen: Tutu’s Vapo-Rub beneath paper tape occlusion BID

## 2019-01-07 NOTE — PROCEDURE: NAIL CLIPPING FOR PAS
Detail Level: Generalized
Billing Type: Third-Party Bill
Body Location Override (Optional - Billing Will Still Be Based On Selected Body Map Location If Applicable): fingernails
Add [id number] To Bill?: No
Lab Facility: [id number]
Lab: [id number]

## 2019-01-11 PROBLEM — L97.511 SKIN ULCER OF TOE OF RIGHT FOOT, LIMITED TO BREAKDOWN OF SKIN: Status: RESOLVED | Noted: 2018-07-27 | Resolved: 2019-01-01

## 2019-01-11 PROBLEM — L97.912 ULCER OF RIGHT LOWER EXTREMITY WITH FAT LAYER EXPOSED: Status: ACTIVE | Noted: 2019-01-01

## 2019-01-11 NOTE — PROGRESS NOTES
Subjective:       Patient ID: Sisi Medel is a 58 y.o. female.    Chief Complaint: Wound Check    Patient is a 55 y.o.  female with a h/o HTN, HLD, CHF (EF 20%), COPD on home O2, paroxysmal Afib (on Coumadin), seizure disorder, ESRD on HD. The patient reports injuring her right lower leg approximately two months ago. She has used various ointments with no success. The patient presented to the ED on 12/30/2018 for discoloration and pain to the area. An arterial US was performed. Results as follows:  Ankle-brachial index of 1.28 on the right and 1.17 on the left. No hemodynamically significant stenosis demonstrated in the right or left lower extremity arterial system. Bilateral calf arteries demonstrate monophasic waveforms which may suggest distal vascular disease. She is here today for wound evaluation and recommendations.           Review of Systems   Constitutional: Positive for fatigue. Negative for chills, diaphoresis and fever.   HENT: Negative for ear pain, nosebleeds, sinus pain, sore throat and trouble swallowing.    Eyes: Negative for pain and redness.   Respiratory: Negative for cough, shortness of breath and wheezing.    Cardiovascular: Positive for leg swelling. Negative for chest pain and palpitations.   Gastrointestinal: Negative for abdominal distention, abdominal pain, blood in stool, nausea and vomiting.   Endocrine: Negative for polydipsia, polyphagia and polyuria.   Genitourinary: Negative for flank pain and hematuria.   Musculoskeletal: Positive for arthralgias. Negative for back pain, joint swelling and myalgias.   Skin: Wound: Right lower leg.   Neurological: Positive for weakness. Negative for seizures, facial asymmetry and headaches.   Hematological: Negative for adenopathy. Bruises/bleeds easily.   Psychiatric/Behavioral: Negative for agitation, dysphoric mood and sleep disturbance. The patient is not nervous/anxious.        Objective:      Physical Exam    Constitutional: She is oriented to person, place, and time. Vital signs are normal. No distress.   Obese female   HENT:   Head: Normocephalic.   Mouth/Throat: Oropharynx is clear and moist.   Eyes: Conjunctivae, EOM and lids are normal. Pupils are equal, round, and reactive to light.   Neck: Trachea normal and normal range of motion. Carotid bruit is not present. No thyromegaly present.   Cardiovascular: Normal rate, regular rhythm and intact distal pulses.   Pulses:       Dorsalis pedis pulses are 1+ on the right side, and 1+ on the left side.   Pulmonary/Chest: Effort normal and breath sounds normal. No respiratory distress. She has no wheezes. She has no rales.   Abdominal: Soft. Bowel sounds are normal. She exhibits no distension. There is no tenderness.   Musculoskeletal: Normal range of motion.        Right lower leg: She exhibits edema.        Legs:  Lymphadenopathy:     She has no cervical adenopathy.   Neurological: She is alert and oriented to person, place, and time.   Skin: Skin is warm and dry. Capillary refill takes 2 to 3 seconds. She is not diaphoretic. No erythema.   Psychiatric: She has a normal mood and affect. Thought content normal.   Vitals reviewed.            Lower Extremity US 12/30/2018: Ankle-brachial index of 1.28 on the right and 1.17 on the left.  No hemodynamically significant stenosis demonstrated in the right or left lower extremity arterial system. Bilateral calf arteries demonstrate monophasic waveforms which may suggest distal vascular disease.  Assessment:       1. Ulcer of right lower extremity with fat layer exposed    2. PVD (peripheral vascular disease)        Plan:       Clean leg wound with soap and water  Apply Medihoney to ulcer  Cover with border dressing  Tubigrip Size D for compression  Apply in am and remove pm, Wear daily  Follow up in one week

## 2019-01-11 NOTE — PATIENT INSTRUCTIONS
Clean leg wound with soap and water  Apply Medihoney to ulcer  Cover with border dressing  Tubigrip Size D  Apply in am and remove pm  Follow up in one week

## 2019-01-15 ENCOUNTER — RX ONLY (OUTPATIENT)
Age: 59
Setting detail: RX ONLY
End: 2019-01-15

## 2019-01-15 RX ORDER — TERBINAFINE HYDROCHLORIDE 250 MG/1
TABLET ORAL
Qty: 30 | Refills: 2 | Status: ERX | COMMUNITY
Start: 2019-01-15

## 2019-01-16 NOTE — TELEPHONE ENCOUNTER
Spoke with Ms. Medel informed her that I received her message and that it was forwarded to the doctor and I'm just waiting on a response from Dr. Altamirano. Patient verbalized she understand.

## 2019-01-16 NOTE — PROGRESS NOTES
INR within therapeutic range today. Patient went to ER on 12/30 for ankle pain. She also reports a change in medications. She is unsure of the name of the medication but states that it is not an antibiotic. She has not started it yet but will call us with the name of the medication. She reports occasional bruising from use. She denies any other changes. We will resume her weekly dose and follow in 5 weeks. Patient was re-educated on situations that would require placing a call to the Coumadin  Clinic, including bleeding or unusual bruising issues, changes in health, diet or medications,upcoming procedures that require warfarin interruption, and missed Coumadin dose(s). Patient expressed understanding that avoidance of consistency with these parameters could cause fluctuations in INR, leading to more frequent visits and increase risk of adverse events. Care plan made with BARRY BREWSTER.

## 2019-01-16 NOTE — TELEPHONE ENCOUNTER
----- Message from Romana Schaffer sent at 1/16/2019 10:37 AM CST -----  Contact: Self [phone]  .Refill request.  predniSONE (DELTASONE) 20 MG tablet    ..  [facility] [id number] - [place], [place] - [address] [facility] [address]  [address] [address]  Phone: [phone] Fax: [phone]

## 2019-01-18 NOTE — PROGRESS NOTES
Subjective:       Patient ID: Sisi Medel is a 58 y.o. female.    Chief Complaint: Wound Check    Patient is a 55 y.o.  female with a h/o HTN, HLD, CHF (EF 20%), COPD on home O2, paroxysmal Afib (on Coumadin), seizure disorder, ESRD on HD. The patient reports injuring her right lower leg approximately two months ago. She has used various ointments with no success. The patient presented to the ED on 12/30/2018 for discoloration and pain to the area. An arterial US was performed. Results as follows:  Ankle-brachial index of 1.28 on the right and 1.17 on the left. No hemodynamically significant stenosis demonstrated in the right or left lower extremity arterial system. Bilateral calf arteries demonstrate monophasic waveforms which may suggest distal vascular disease. Medihoney was used for topical treatment with tubigrip for compression. Will advance patient to lite two layer compression therapy. Good progress.         Review of Systems   Constitutional: Positive for fatigue. Negative for chills, diaphoresis and fever.   HENT: Negative for ear pain, nosebleeds, sinus pain, sore throat and trouble swallowing.    Eyes: Negative for pain and redness.   Respiratory: Negative for cough, shortness of breath and wheezing.    Cardiovascular: Positive for leg swelling. Negative for chest pain and palpitations.   Gastrointestinal: Negative for abdominal distention, abdominal pain, blood in stool, nausea and vomiting.   Endocrine: Negative for polydipsia, polyphagia and polyuria.   Genitourinary: Negative for flank pain and hematuria.   Musculoskeletal: Positive for arthralgias. Negative for back pain, joint swelling and myalgias.   Skin: Wound: Right lower leg.   Neurological: Positive for weakness. Negative for seizures, facial asymmetry and headaches.   Hematological: Negative for adenopathy. Bruises/bleeds easily.   Psychiatric/Behavioral: Negative for agitation, dysphoric mood and sleep  disturbance. The patient is not nervous/anxious.        Objective:      Physical Exam   Constitutional: She is oriented to person, place, and time. Vital signs are normal. No distress.   Obese female   HENT:   Head: Normocephalic.   Mouth/Throat: Oropharynx is clear and moist.   Eyes: Conjunctivae, EOM and lids are normal. Pupils are equal, round, and reactive to light.   Neck: Trachea normal and normal range of motion. Carotid bruit is not present. No thyromegaly present.   Cardiovascular: Normal rate, regular rhythm and intact distal pulses.   Pulses:       Dorsalis pedis pulses are 2+ on the right side, and 2+ on the left side.   Pulmonary/Chest: Effort normal and breath sounds normal. No respiratory distress. She has no wheezes. She has no rales.   Abdominal: Soft. Bowel sounds are normal. She exhibits no distension. There is no tenderness.   Musculoskeletal: Normal range of motion.        Right lower leg: She exhibits edema.        Legs:  Lymphadenopathy:     She has no cervical adenopathy.   Neurological: She is alert and oriented to person, place, and time.   Skin: Skin is warm and dry. Capillary refill takes 2 to 3 seconds. She is not diaphoretic. No erythema.   Psychiatric: She has a normal mood and affect. Thought content normal.   Vitals reviewed.          Lower Extremity US 12/30/2018: Ankle-brachial index of 1.28 on the right and 1.17 on the left.  No hemodynamically significant stenosis demonstrated in the right or left lower extremity arterial system. Bilateral calf arteries demonstrate monophasic waveforms which may suggest distal vascular disease.  Assessment:       1. Ulcer of right lower extremity with fat layer exposed    2. PVD (peripheral vascular disease)        Plan:       Two layer compression, Coflex  Apply Medihoney to ulcer  Cover with border dressing  Follow up in one week  Avoid getting dressing wet  Notify clinic is dressing becomes wet

## 2019-01-22 NOTE — PROGRESS NOTES
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

## 2019-01-23 NOTE — PROGRESS NOTES
Sisi Medel  01/23/2018    HPI:  Patient is a 55 y.o.  female with a h/o HTN, HLD, CHF (EF 20%), COPD on home O2, paroxysmal Afib (on Coumadin), seizure disorder, ESRD on HD TThS via LUE AVG (placed 2/3/16) who presents to clinic today for follow up. She was last seen in our clinic in 10/2018, and  underwent fistulagram with PTA of distal anastomosis and venous outflow in 08/2018.  She has done well at HD.  She also recently fell and lacerated her R leg, which has been improving but draining some serosanguinous fluid.     Patient has been having pain x 2-3 weeks from the right dorsal foot to lower calf, some pain in left ankle as well. She also has numbness and . She has some rest pain, and resting her legs does not help. She's noticed hyperpigmentation and ulceration as well on the right lower calf. Symptoms are worsening.    Patient gets HD via LUE AVG. Per patient, the venous pressure is frequently elevated. She does not have issues with bleeding or accessing the graft.     She has been undergoing RLE venous ulcer care in our wound clinic for the past several weeks.    Patient takes ASA, statin, Plavix, coumadin.    S/p   2/3/16: LUE AVG creation (Dr Villafana)  6/21/16: Percutaneous mechanical thrombectomy w Possis Angiojet AVX; 4fr OTW embolectomy of arterial inflow   PTA outflow stenosis with a 7x40 mm balloon  10/12/16: fistulogram (75% stenosis noted), left upper extremity AV graft PTA venous outflow with resolution of stenosis noted  2/2017 Declot and venous outflow PTA and stent with 8 x 50 VIABAHN  5/15/2017: PTA outflow stenosis (8x60 mustang); Stent outflow stenosis (8x50 Viabahn)  10/12/17: PTA LUE AV graft (brachial vein) x2: (7x60 [place]); (8x40 Iraan)   01/10/18: PTA outflow stensois (8x40 Iraan) and (9x40 Iraan)   2/2/18: PTA arterial inflow (7 x 40 Iraan)  8/2018: 1.  PTA outflow stenosis 8 x 40 Iraan; PTA outflow 10 x 40 Iraan balloon.    No MI/stroke  Tobacco  use: Former smoker     Past Medical History   Diagnosis Date    Anemia in ESRD (end-stage renal disease) 3/7/2016    Cervical radiculopathy 2015    Chronic combined systolic and diastolic heart failure 2013     EF 20% , improved with Medical therapy, negative PET     Chronic respiratory failure with hypoxia 2013     On home oxygen at 2-5 liters    Chronic rhinitis 10/2/2013    DDD (degenerative disc disease), lumbar 2015    ESRD on hemodialysis 2016    Essential hypertension     Gout     Hyperlipidemia 3/7/2016    Lateral meniscal tear 2016    Lumbar stenosis 2015    Morbid obesity 8/15/2013    Paroxysmal atrial fibrillation 2014     Not on anticoagulation    Peripheral neuropathy 2013    Personal history of fall 2014    Personal history of gastric ulcer 3/19/2013    Sarcoidosis, lung 2009     Diagnosed in  with ocular manifestation, on 4L home O2 and PO steroids    Secondary pulmonary hypertension 2015    Seizure disorder 3/19/2013    Shingles 2013    Spondylarthrosis 2015     Past Surgical History   Procedure Laterality Date     section, classic      Abdominal surgery      Vascular surgery       Family History   Problem Relation Age of Onset    Kidney failure Mother     Hypertension Mother     Diabetes Mother     Coronary artery disease Father     Hypertension Father     Diabetes Father     Lupus Sister     Diabetes Maternal Grandmother     Asthma Neg Hx     Emphysema Neg Hx     Melanoma Neg Hx     Psoriasis Neg Hx      Social History     Social History    Marital status: Single     Spouse name: N/A    Number of children: N/A    Years of education: N/A     Occupational History    Not on file.     Social History Main Topics    Smoking status: Former Smoker     Packs/day: 0.50     Years: 10.00     Types: Cigarettes     Quit date: 1994    Smokeless tobacco: Never Used    Alcohol use  No    Drug use: No    Sexual activity: No     Other Topics Concern    Not on file     Social History Narrative    Lives with boyfriend/partner who helps with her care.     Plans to travel to [place] for 2 weeks in 9/2016     Current Outpatient Prescriptions on File Prior to Visit   Medication Sig    ACZONE 5 % topical gel Apply topically once daily.     allopurinol (ZYLOPRIM) 100 MG tablet Take 1 tablet (100 mg total) by mouth once daily.    amiodarone (PACERONE) 200 MG Tab Take 1 tablet (200 mg total) by mouth every morning.    ammonium lactate (LAC-HYDRIN) 12 % lotion Apply topically as needed (for scars on arms).     aspirin 81 MG Chew Take 81 mg by mouth every morning.    atorvastatin (LIPITOR) 80 MG tablet Take 80 mg by mouth once daily.     capsicum 0.075% (ZOSTRIX) 0.075 % topical cream Apply topically 3 (three) times daily. For neuropathic pain of feet    gabapentin (NEURONTIN) 100 MG capsule TAKE ONE CAPSULE BY MOUTH THREE TIMES DAILY (Patient taking differently: TAKE ONE CAPSULE BY MOUTH THREE TIMES DAILY-noon, evening, bedtime)    levetiracetam (KEPPRA) 500 MG Tab TAKE 1 TABLET BY MOUTH TWICE DAILY.    melatonin 5 mg Tab Take 1 tablet by mouth nightly.    midodrine (PROAMATINE) 10 MG tablet     midodrine (PROAMATINE) 5 MG Tab     NEPHRO-LINDA 0.8 mg Tab TK 1 T PO  QD    ondansetron (ZOFRAN-ODT) 8 MG TbDL Take 1 tablet (8 mg total) by mouth every 8 (eight) hours as needed.    oxycodone-acetaminophen (PERCOCET) 7.5-325 mg per tablet Take 1 tablet by mouth every 4 (four) hours as needed.    predniSONE (DELTASONE) 10 MG tablet TAKE 3 TABLETS BY MOUTH FOR 7 DAYS, THEN 2 TABLETS FOR 7 DAYS, THEN 1 TABLET EVERY DAY AFTER.    RENVELA 800 mg Tab TAKE 1 TABLET BY MOUTH THREE TIMES DAILY WITH MEALS    tizanidine 4 mg Cap Take 4 mg by mouth 2 (two) times daily as needed. (Patient taking differently: Take 4 mg by mouth daily as needed (for muscle spasms.). )    warfarin (COUMADIN) 5 MG tablet TAKE 1  TABLET(5 MG) BY MOUTH DAILY     No current facility-administered medications on file prior to visit.        REVIEW OF SYSTEMS:  General: negative; ENT: negative; Allergy and Immunology: negative; Hematological and Lymphatic: Negative; Endocrine: negative; Respiratory: no cough, shortness of breath, or wheezing; Cardiovascular: no chest pain or dyspnea on exertion; Gastrointestinal: no abdominal pain/back, change in bowel habits, or bloody stools; Genito-Urinary: no dysuria, trouble voiding, or hematuria; Musculoskeletal: see HPI  Neurological: no TIA or stroke symptoms; Psychiatric: no nervousness, anxiety or depression.    PHYSICAL EXAM:   There were no vitals filed for this visit.      General appearance:  Alert, well-appearing, and in no distress.  Oriented to person, place, and time   Neurological:  Normal speech, no focal findings noted; CN II - XII grossly intact           Musculoskeletal: Digits/nail without cyanosis/clubbing.  Normal muscle strength/tone.                 Neck: Supple, no significant adenopathy; thyroid is not enlarged                Chest:  Clear to auscultation, symmetric air entry      No use of accessory muscles             Cardiac: Normal rate and regular rhythm, S1 and S2 normal; PMI non-displaced          Abdomen: Soft, non-tender, non-distended, no masses or organomegaly      Extremities:   LUE AVG with no palpable thrill, pulsatile, 2+ distal radial pulse;       RLE with hyperpigmentation, induration from distal calf to proximal foot, 2 x 2 cm superficial venous ulceration with mild serous exudate;       Biphasic R pedal signals    LAB RESULTS:  Lab Results   Component Value Date    WBC 10.54 12/30/2018    HGB 11.7 (L) 12/30/2018    HCT 39.4 12/30/2018    MCV 93 12/30/2018     12/30/2018     BMP  Lab Results   Component Value Date     12/31/2018    K 4.9 12/31/2018     12/31/2018    CO2 23 12/31/2018    BUN 53 (H) 12/31/2018    CREATININE 7.8 (H) 12/31/2018     CALCIUM 8.3 (L) 2018    ANIONGAP 16 2018    ESTGFRAFRICA 6.0 (A) 2018    EGFRNONAA 5.2 (A) 2018     Lab Results   Component Value Date    HGBA1C 6.7 (H) 10/24/2018       IMAGIN/4/19 VASU - 1.31 R, 1.31 L; PVR waveforms suggest minimal peripheral arterial occlusive disease                                             Velocities: in cm./sec  Inflow-77  arterial anastomosis-169  proximal graft- 113  mid graft-118  distal graft-67  venous anastomosis-40  venous outflow- 38  venous outflow distal- 150               volume flow: 823 in ml./min.     Overall Impression:  Color flow duplex exam reveals a patent left hemodialysis AV graft and outflow stents with no evidence of a hemodyanmically significant stenoisis despite slightly elevated velocities in the distal outflow vein. Volume flow at mid graft level measures 823   ml./min.    IMP/PLAN:  55 y.o. female with HTN, HLD, CHF (EF 20%), COPD on home O2, paroxysmal Afib (on Coumadin), seizure disorder, ESRD on HD TThS via LUE AVG. Small venous ulceration and stigmata of venous stasis RLE. Her LUE AVG flow is down and she will require a fistulagram.    1) plan for LUE fistulagram   2) stay on coumadin  3) continue wound care/compression      Torrey Villafana MD  Vascular & Endovascular Surgery

## 2019-01-25 NOTE — PROGRESS NOTES
Subjective:       Patient ID: Sisi Medel is a 58 y.o. female.    Chief Complaint: No chief complaint on file.    Patient is a 55 y.o.  female with a h/o HTN, HLD, CHF (EF 20%), COPD on home O2, paroxysmal Afib (on Coumadin), seizure disorder, ESRD on HD. The patient reports injuring her right lower leg approximately two months ago. She has used various ointments with no success. The patient presented to the ED on 12/30/2018 for discoloration and pain to the area. An arterial US was performed. Results as follows:  Ankle-brachial index of 1.28 on the right and 1.17 on the left. No hemodynamically significant stenosis demonstrated in the right or left lower extremity arterial system. Bilateral calf arteries demonstrate monophasic waveforms which may suggest distal vascular disease. Medihoney is being used for topical treatment with two layer compression therapy. The patient presents today with several pimple lesions to the right lower leg.         Review of Systems   Constitutional: Positive for fatigue. Negative for chills, diaphoresis and fever.   HENT: Negative for ear pain, nosebleeds, sinus pain, sore throat and trouble swallowing.    Eyes: Negative for pain and redness.   Respiratory: Negative for cough, shortness of breath and wheezing.    Cardiovascular: Positive for leg swelling. Negative for chest pain and palpitations.   Gastrointestinal: Negative for abdominal distention, abdominal pain, blood in stool, nausea and vomiting.   Endocrine: Negative for polydipsia, polyphagia and polyuria.   Genitourinary: Negative for flank pain and hematuria.   Musculoskeletal: Positive for arthralgias. Negative for back pain, joint swelling and myalgias.   Skin: Wound: Right lower leg.   Neurological: Positive for weakness. Negative for seizures, facial asymmetry and headaches.   Hematological: Negative for adenopathy. Bruises/bleeds easily.   Psychiatric/Behavioral: Negative for agitation,  dysphoric mood and sleep disturbance. The patient is not nervous/anxious.        Objective:      Physical Exam   Constitutional: She is oriented to person, place, and time. Vital signs are normal. No distress.   Obese female   HENT:   Head: Normocephalic.   Mouth/Throat: Oropharynx is clear and moist.   Eyes: Conjunctivae, EOM and lids are normal. Pupils are equal, round, and reactive to light.   Neck: Trachea normal and normal range of motion. Carotid bruit is not present. No thyromegaly present.   Cardiovascular: Normal rate, regular rhythm and intact distal pulses.   Pulses:       Dorsalis pedis pulses are 2+ on the right side, and 2+ on the left side.   Pulmonary/Chest: Effort normal and breath sounds normal. No respiratory distress. She has no wheezes. She has no rales.   Abdominal: Soft. Bowel sounds are normal. She exhibits no distension. There is no tenderness.   Musculoskeletal: Normal range of motion.        Right lower leg: She exhibits edema.        Legs:  Lymphadenopathy:     She has no cervical adenopathy.   Neurological: She is alert and oriented to person, place, and time.   Skin: Skin is warm and dry. Capillary refill takes 2 to 3 seconds. She is not diaphoretic. No erythema.   Psychiatric: She has a normal mood and affect. Thought content normal.   Vitals reviewed.      Right lateral leg    Right medial leg    Sisi was seen in the clinic room and placed in the supine position on the treatment table.  The coflex bandage was removed with scissors and the leg was cleansed with Easi-clense sponges and dried thoroughly.  Eucerin cream was applied to the lower legs. Medihoney followed by hydrofiber dressing was applied to the wound.  The patient's foot was positioned at a 90 degree angle.  The coflex was applied per package instructions.  A two layered application was performed using a spiral technique beginning with the foam layer followed by the cohesive bandage avoiding creases or folds.  The wrap  was started behind the first metatarsal and ended below the tibial tubercle of the knee.  There was overlap of each turn half the width of the previous turn.  The compression wrap will be changed every 7 days.    Lower Extremity US 12/30/2018: Ankle-brachial index of 1.28 on the right and 1.17 on the left.  No hemodynamically significant stenosis demonstrated in the right or left lower extremity arterial system. Bilateral calf arteries demonstrate monophasic waveforms which may suggest distal vascular disease.  Assessment:       1. Ulcer of right lower extremity with fat layer exposed        Plan:       Two layer compression, Coflex  Apply Medihoney to ulcer and right lateral leg lesions  Cover with border dressing  Follow up in one week  Avoid getting dressing wet    Patient was warned not to get the dressings wet and to use cast covers for showering.  Should the dressing become wet, he is to remove it, place a wet-to-dry dressing over the wound, cover with gauze and roll gauze and use ace wraps for compression and to secure bandages.  He should then notify this office as soon as possible to have a new dressing applied.

## 2019-01-25 NOTE — PATIENT INSTRUCTIONS
Elevate legs as much as possible. Do not get the dressings wet and use cast covers for showering.  Should the dressing become wet, remove it, place a wet-to-dry dressing over the wound, cover with gauze and roll gauze and use ace wraps for compression and to secure bandages.  Notify clinic as soon as possible to have a new dressing applied.    Return to clinic in one week

## 2019-01-28 NOTE — LETTER
January 28, 2019        Aaron Ware  [address]  [id number]  [facility] [address]  Phone: [phone]  Fax: [phone]             [facility]  [address]  [facility] [address]  Phone: [phone]   Patient: Sisi Medel   MR Number: [id number]   YOB: 1960   Date of Visit: 1/28/2019       Dear Dr. Aaron Ware    Thank you for referring iSsi Medel to me for evaluation. Attached you will find relevant portions of my assessment and plan of care.    If you have questions, please do not hesitate to call me. I look forward to following Sisi Medel along with you.    Sincerely,    Cara Chavarria MD    Enclosure    If you would like to receive this communication electronically, please contact [email or web address] or [phone] to request [employer] Link access.    [employer] is a tool which provides read-only access to select patient information with whom you have a relationship. Its easy to use and provides real time access to review your patients record including encounter summaries, notes, results, and demographic information.    If you feel you have received this communication in error or would no longer like to receive these types of communications, please e-mail [email or web address]

## 2019-01-28 NOTE — PATIENT INSTRUCTIONS
1. Continue current therapy.  2.  Keep salt intake to under 2000 mg sodium, fluids to under 2 L (64 oz)  3  Check your weights every morning after getting out of bed and urinating. If your weight goes up 3# overnight or 5# in one week let your dialysis tram know.  4. Call us if you find yourself getting more short of breath, have more swelling or unexpected weight changes, fatigue or chest pain

## 2019-01-28 NOTE — PROGRESS NOTES
"Subjective:   Ms. Medel is a 58 y.o. year old  Female patient who is been seen today for routine evaluation of CHF.      This is a 57 yo AAF with sarcoidosis, chronic combined CHF  (EF 35-40%), PH, PAF, ESRD on HD MWF, morbid obesity, YOANDY, HBP, chronic respiratory failure who returns for HF/PH f/u (was on adcirca but stopped due to symptomatic hypotension). Since last evaluation on 08/2018 the patient has denied any new hospital visit due to decompensated HF. Patient still refer exertional dyspnea falling on NYHA class III that has not worsened. Patient on chronic oxygen therapy. Patient has denied chest pain, palpitations or dizziness. Patient has denied fever or hemoptysis. Patient referred been compliant with medical therapy. Patient with no additional complaints.Patient with planned fistulogram on 01/30/019 due to low flow thru the fistula during HD session. Most recent labs from today still pending.    TTE 08/15/2018:     1 - Moderately depressed left ventricular systolic function (EF 35-40%).    2 - Wall motion abnormalities.    3 - Biatrial enlargement.    4 - Right ventricular enlargement with normal systolic function.    5 - The estimated PA systolic pressure is 37 mmHg.    6 - Mild to moderate mitral regurgitation.       Review of Systems   Constitution: Negative for chills, decreased appetite, diaphoresis, fever and weight loss.   Cardiovascular: Positive for dyspnea on exertion (NYHA class III (stable)) and leg swelling. Negative for chest pain, claudication, cyanosis, irregular heartbeat, near-syncope, orthopnea, palpitations and syncope.   Respiratory: Negative for hemoptysis.    Musculoskeletal: Negative for myalgias.   Gastrointestinal: Negative for abdominal pain and hematemesis.   Genitourinary: Negative for hematuria.       Objective:   Blood pressure 111/77, pulse 71, height 5' 8" (1.727 m), weight 121 kg (266 lb 12.1 oz).body mass index is 40.56 kg/m².    Physical Exam   Constitutional: She " is oriented to person, place, and time. She appears well-developed.   HENT:   Head: Normocephalic.   Eyes: Pupils are equal, round, and reactive to light.   Neck: Normal range of motion. No JVD present.   Cardiovascular: Normal rate.   Murmur heard.  Pulmonary/Chest: Effort normal. No respiratory distress. She has no wheezes. She has no rales.   Abdominal: Soft. She exhibits no distension. There is no tenderness. There is no rebound.   Musculoskeletal: Normal range of motion. She exhibits edema (+1 lower extremity edema with venous stasis changes.).   Neurological: She is alert and oriented to person, place, and time. She has normal reflexes.   Skin: Skin is warm and dry.       Lab Results   Component Value Date    WBC 10.54 12/30/2018    HGB 11.7 (L) 12/30/2018    HCT 39.4 12/30/2018    MCV 93 12/30/2018     12/30/2018    CO2 23 12/31/2018    CREATININE 7.8 (H) 12/31/2018    CALCIUM 8.3 (L) 12/31/2018    ALBUMIN 3.5 10/24/2018    AST 26 10/24/2018     (H) 12/30/2018    ALT 23 10/24/2018    .0 (H) 12/08/2017       Lab Results   Component Value Date    INR 2.5 01/16/2019    INR 2.7 12/19/2018    INR 2.0 11/28/2018       BNP   Date Value Ref Range Status   12/30/2018 673 (H) 0 - 99 pg/mL Final     Comment:     Values of less than 100 pg/ml are consistent with non-CHF populations.   08/15/2018 674 (H) 0 - 99 pg/mL Final     Comment:     Values of less than 100 pg/ml are consistent with non-CHF populations.   03/26/2018 550 (H) 0 - 99 pg/mL Final     Comment:     Values of less than 100 pg/ml are consistent with non-CHF populations.       LD   Date Value Ref Range Status   10/19/2013 507 (H) 110 - 260 U/L Final     Comment:     Results are increased in hemolyzed samples.       Assessment:     1. Chronic combined systolic and diastolic heart failure    2. Pulmonary sarcoidosis    3. Mixed hyperlipidemia    4. End stage renal failure on dialysis    5. Pulmonary hypertension    6. Chronic respiratory  failure with hypoxia    7. Current use of long term anticoagulation    8. Controlled type 2 diabetes mellitus with complication, without long-term current use of insulin        Plan:   -Will continue with current medical therapy, patient with exertional dyspnea falling on NYHA class III with no worsening and relatively stable since last evaluation.  -Recommend 2 gram sodium restriction, 32 oz fluid restriction per nephrology  -Encourage physical activity with graded exercise program.  -Requested patient to weigh themselves daily, and to notify us if their weight increases by more than 3 lbs in 1 day or 5 lbs in 1 week.  -Case discussed with attending physician .    Obinna Barreto MD

## 2019-01-28 NOTE — PROGRESS NOTES
Patient, Sisi Medel (MRN #[id number]), presented with a recorded BMI of 40.56 kg/m^2 consistent with the definition of morbid obesity (ICD-10 E66.01). The patient's morbid obesity was monitored, evaluated, addressed and/or treated. This addendum to the medical record is made on 01/28/2019.

## 2019-01-28 NOTE — PROGRESS NOTES
Subjective:    Patient ID:  Sisi Medel is a 58 y.o. female who presents for follow-up of Congestive Heart Failure      Congestive Heart Failure   Pertinent negatives include no abdominal pain, chest pain, near-syncope, palpitations or shortness of breath.       This is a 57 yo AAF with sarcoidosis, chronic combined CHF which demonstrated successful remodeling and most recently EF 40-45% range, PH, PAF, ESRD on HD MWF, morbid obesity, YOANDY, HBP, chronic respiratory failure who returns for HF/PH f/u (was on adcirca but stopped due to symptomatic hypotension)    Since last visit, pt has been doing the same in terms of her breathing. No recent hosp. Only issue is related to occlusion of her HD fistula (has a procedure scheduled this week)      She is anuric.  Watching her salt. Sleeps on her usual 3 pillows, no PND.        TTE 8/15/18   1 - Moderately depressed left ventricular systolic function (EF 35-40%).     2 - Wall motion abnormalities.     3 - Biatrial enlargement.     4 - Right ventricular enlargement with normal systolic function.     5 - The estimated PA systolic pressure is 37 mmHg.     6 - Mild to moderate mitral regurgitation.        Review of Systems   Constitution: Negative for chills, decreased appetite, diaphoresis, fever, weakness, malaise/fatigue, night sweats, weight gain and weight loss.   HENT: Negative.    Eyes: Negative.    Cardiovascular: Positive for dyspnea on exertion. Negative for chest pain, cyanosis, irregular heartbeat, leg swelling, near-syncope, orthopnea, palpitations, paroxysmal nocturnal dyspnea and syncope.   Respiratory: Positive for cough. Negative for hemoptysis, shortness of breath, sleep disturbances due to breathing, snoring, sputum production and wheezing.    Endocrine: Negative.    Hematologic/Lymphatic: Does not bruise/bleed easily.   Skin: Negative.    Musculoskeletal: Positive for falls and joint pain.   Gastrointestinal: Negative for bloating, abdominal pain,  "change in bowel habit and diarrhea.   Neurological: Negative for light-headedness and loss of balance.   Psychiatric/Behavioral: Negative for depression.     /77 (BP Location: Right arm, Patient Position: Sitting, BP Method: Medium (Automatic))   Pulse 71   Ht 5' 8" (1.727 m)   Wt 121 kg (266 lb 12.1 oz)   LMP  (LMP Unknown)   BMI 40.56 kg/m²      Physical Exam   Constitutional: She is oriented to person, place, and time. She appears well-developed and well-nourished.   HENT:   Head: Normocephalic and atraumatic.   Eyes: Conjunctivae and EOM are normal. Pupils are equal, round, and reactive to light.   Neck: Neck supple. JVD present. No tracheal deviation present. No thyromegaly present.   Cardiovascular: Normal rate, regular rhythm and normal heart sounds. PMI is displaced. Exam reveals no gallop and no friction rub.   No murmur heard.  Pulmonary/Chest: Effort normal. No respiratory distress. She has no wheezes. She has rales. She exhibits no tenderness.   Few scattered crackles   Abdominal: Soft. Bowel sounds are normal. She exhibits no distension and no mass. There is no tenderness. There is no rebound and no guarding.   Musculoskeletal: Normal range of motion. She exhibits edema. She exhibits no tenderness.   1+ pitting edema to knees   Lymphadenopathy:     She has no cervical adenopathy.   Neurological: She is alert and oriented to person, place, and time. She has normal reflexes. No cranial nerve deficit. She exhibits normal muscle tone. Coordination normal.   Skin: Skin is warm and dry.   Psychiatric: She has a normal mood and affect. Her behavior is normal. Thought content normal.   Nursing note and vitals reviewed.          Chemistry        Component Value Date/Time     (L) 01/28/2019 0732    K 4.9 01/28/2019 0732    CL 93 (L) 01/28/2019 0732    CO2 25 01/28/2019 0732    BUN 78 (H) 01/28/2019 0732    CREATININE 9.4 (H) 01/28/2019 0732     01/28/2019 0732        Component Value " Date/Time    CALCIUM 8.4 (L) 01/28/2019 0732    ALKPHOS 163 (H) 01/28/2019 0732    AST 23 01/28/2019 0732    ALT 22 01/28/2019 0732    BILITOT 0.6 01/28/2019 0732            Magnesium   Date Value Ref Range Status   08/15/2018 2.7 (H) 1.6 - 2.6 mg/dL Final       Lab Results   Component Value Date    WBC 9.30 01/28/2019    HGB 13.8 01/28/2019    HCT 46.2 01/28/2019    MCV 94 01/28/2019     01/28/2019       Lab Results   Component Value Date    INR 2.5 01/16/2019    INR 2.7 12/19/2018    INR 2.0 11/28/2018       BNP   Date Value Ref Range Status   01/28/2019 399 (H) 0 - 99 pg/mL Final     Comment:     Values of less than 100 pg/ml are consistent with non-CHF populations.   12/30/2018 673 (H) 0 - 99 pg/mL Final     Comment:     Values of less than 100 pg/ml are consistent with non-CHF populations.   08/15/2018 674 (H) 0 - 99 pg/mL Final     Comment:     Values of less than 100 pg/ml are consistent with non-CHF populations.     Assessment:       1. Chronic combined systolic and diastolic CHF now on HD Remains FC III, BNP elevated at lower end of her usual range- has been more compliant with fluid/Na restrictions lately and echo stable to slightly improved   2. Chronic hypoxic resp failure- on home O2, stable   3. Shortness of breath    4. Sarcoidosis- on systemic steroids   5. Pulmonary hypertension-   6. Paroxysmal atrial fibrillation- with plans for ablation    7. Morbid obesity    8. ESRD on HD MWF   9 Hyperkalemia     Plan:       No changes today to her HF regimen    -Recommend 2 gram sodium restriction, 32 oz fluid restriction per nephrology  Encourage physical activity with graded exercise program.  Requested patient to weigh themselves daily, and to notify us if their weight increases by more than 3 lbs in 1 day or 5 lbs in 1 week.    F/u 6 mo with labs

## 2019-01-30 PROBLEM — T82.858A ARTERIOVENOUS GRAFT STENOSIS: Status: ACTIVE | Noted: 2019-01-01

## 2019-01-30 NOTE — INTERVAL H&P NOTE
The patient has been examined and the H&P has been reviewed:    I concur with the findings and no changes have occurred since H&P was written.    Anesthesia/Surgery risks, benefits and alternative options discussed and understood by patient/family.          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

## 2019-01-30 NOTE — Clinical Note
The site was marked. Prepped: left brachial. Prepped with: ChloraPrep. The site was clipped. The patient was draped.

## 2019-01-30 NOTE — PROGRESS NOTES
Patient discharged per MD orders. Instructions given on medications, wound care, activity, signs of infection, when to call MD, and follow up appointments. Pt verbalized understanding. Patient and family refused transport, ambulated off unit.

## 2019-01-30 NOTE — OP NOTE
1/30/2019    Surgeon(s) and Role:     * Torrey Villafana MD - Primary     Pre-op Diagnosis:  Stenosis of arteriovenous graft, subsequent encounter [T82.492D];      Post-op Diagnosis:  Same + outflow      Procedure(s):  1) LUE AVG outflow PTA (8 x 40 Centerview)  2) LUE AVG outflow PTA (9 x 60 Lutonix Drug Coated Balloon)  3) LUE fisltulagram    4) Conscious sedation 45 min     Surgeon: Torrey Villafana MD  Vascular & Endovascular Surgery      Assistant: NEY Guerra MD     Anesthesia: Local MAC     Findings/Key Components:  Successful treatment of LUE AVG outflow stenosis     EBL: Minimal       Complications:  None; patient tolerated the procedure well.           Disposition: Recovery.           Condition: stable    Procedure Details:  The patient was brought to the Cath Lab, placed in supine. Left arm was prepped and draped in the standard surgical fashion. The proximal aspect of the LUE AVG was accessed with a micropuncture needle; ultrasound confirmed vessel patency, followed by placement of 4/3-St Lucian micropuncture dilator. Fistulogram was performed and revealed two prior outflow tract stent with >90% ISR and additional stenosis at the outflow brachial vein that is >90%. Through this, an 0.035-inch wire was placed in the short 6-St Lucian sheath. An 0.035-inch Glidewire was placed through the high-grade stenosis, which was demonstrated by the angiogram. The two previous Stents and ISR within was treated with 8x40mm Centerview balloon. Resolution of the stenosis was noted. The brachial vein outflow stenosis was first treated with 8x40mm Centerview balloon with marginal improvement, therefore 9x60mm Lutonix balloon was then used with much improved result. (~20% residual stenosis was noted at completion but strong thrill could be felt). The sheath was removed, 3-0 nylon U-stitch was placed with good hemostasis.    Dr. Villafana was present for the entire procedure and monitored conscious sedation for 45 minutes.  For medication  dosages, please see nursing record.      Yonatan Guerra MD  Vascular/Endovascular Surgery Fellow

## 2019-01-30 NOTE — PLAN OF CARE
Problem: Adult Inpatient Plan of Care  Goal: Plan of Care Review  Outcome: Ongoing (interventions implemented as appropriate)  Received report from Tarsha. Patient s/p fistulagram, AAOx3. VSS, no c/o pain or discomfort at this time, resp even and unlabored. Gauze/tegaderm dressing to L upper arm is CDI. No active bleeding. No hematoma noted. Post procedure protocol reviewed with patient. Understanding verbalized. Nurse call bell within reach. Will continue to monitor per post procedure protocol.

## 2019-01-30 NOTE — BRIEF OP NOTE
Brief Operative Note  Date: 01/30/2019    Surgeon(s) and Role:     * Torrey Villafana MD - Primary    Pre-op Diagnosis:  Stenosis of arteriovenous graft, subsequent encounter [T89.721G];     Post-op Diagnosis:  Same + outflow     Procedure(s):  1) LUE AVG outflow PTA (8 x 40 [place])  2) LUE AVG outflow PTA (9 x 60 Lutonix Drug Coated Balloon)  3) LUE fisltulagram      Surgeon: Torrey Villafana MD  Vascular & Endovascular Surgery     Assistant: NEY Guerra MD    Anesthesia: Local MAC    Findings/Key Components:  Successful treatment of LUE AVG outflow stenosis    EBL: Minimal         Specimens (From admission, onward)    None          I attest to being present for the procedure and performing the case.  Torrey Villafana MD  Vascular & Endovascular Surgery     Discharge Note  SUMMARY    Admit Date: 1/30/2019    Attending Physician: Torrey Villafana MD  Vascular & Endovascular Surgery       Discharge Physician: Torrey Villafana MD  Vascular & Endovascular Surgery       Discharge Date: 01/30/2019    Final Diagnosis: Stenosis of arteriovenous graft, subsequent encounter [T85.391D]    Disposition: Home or self-care    Patient Instructions:   Discharge Medication List as of 1/30/2019  1:00 PM      CONTINUE these medications which have NOT CHANGED    Details   aspirin (ECOTRIN) 81 MG EC tablet Take 81 mg by mouth once daily., Historical Med      atorvastatin (LIPITOR) 80 MG tablet TAKE 1 TABLET BY MOUTH EVERY EVENING, Normal      clopidogrel (PLAVIX) 75 mg tablet Take 1 tablet (75 mg total) by mouth once daily., Starting Mon 3/26/2018, Normal      fludrocortisone (FLORINEF) 0.1 mg Tab Take 100 mcg by mouth 2 (two) times daily., Until Discontinued, Historical Med      gabapentin (NEURONTIN) 600 MG tablet TAKE 1 TABLET(600 MG) BY MOUTH TWICE DAILY, Normal      ketorolac 0.5% (ACULAR) 0.5 % Drop instill one drop into both eyes every morning, Historical Med      levETIRAcetam (KEPPRA) 500 MG Tab Take 1 tablet (500 mg total)  by mouth 2 (two) times daily., Starting Tue 12/19/2017, Normal      linagliptin (TRADJENTA) 5 mg Tab tablet Take 1 tablet (5 mg total) by mouth once daily., Starting Fri 7/27/2018, Until Sat 7/27/2019, Normal      midodrine (PROAMATINE) 5 MG Tab Starting Tue 5/29/2018, Historical Med      nortriptyline (PAMELOR) 25 MG capsule TK 1 C PO QPM, Historical Med      omeprazole (PRILOSEC) 20 MG capsule TAKE 1 CAPSULE BY MOUTH EVERY DAY, Normal      prednisoLONE acetate (PRED FORTE) 1 % DrpS INSTILL ONE DROP INTO  LEFT EYE THREE TIMES A DAY, Historical Med      predniSONE (DELTASONE) 20 MG tablet Take 1 tablet (20 mg total) by mouth once daily., Starting Wed 1/16/2019, Normal      pregabalin (LYRICA) 100 MG capsule Take 1 capsule (100 mg total) by mouth 2 (two) times daily., Starting Mon 11/12/2018, Until Mon 5/13/2019, Print      DENISE-LINDA 0.8 mg Tab TK 1 T PO QD, Historical Med      RENVELA 800 mg Tab TAKE 1 TABLET BY MOUTH THREE TIMES DAILY WITH MEALS, Normal      sodium polystyrene (KAYEXALATE) 15 gram/60 mL Susp Take 60 mLs (15 g total) by mouth daily as needed. Take when directed by your doctor, Starting Wed 8/15/2018, Normal      terbinafine HCl (LAMISIL) 250 mg tablet Take 250 mg by mouth once daily. , Starting Tue 1/15/2019, Historical Med      tiZANidine (ZANAFLEX) 4 MG tablet TAKE 1 TABLET BY MOUTH EVERY DAY AS NEEDED FOR MUSCLE SPASMS, Normal      !! warfarin (COUMADIN) 5 MG tablet TAKE 1 TABLET BY MOUTH EVERY DAY EXCEPT TH 1AND 1/2 TABLETS ON MONDAY AND FRIDAY OR AS DIRECTED, Normal      betamethasone dipropionate (DIPROLENE) 0.05 % ointment AAA bid hand under occlusion, Normal      blood sugar diagnostic Strp 1 each by Misc.(Non-Drug; Combo Route) route once daily., Starting Thu 12/6/2018, Print      blood-glucose meter kit Use as instructed, Print      BOOSTRIX TDAP 2.5-8-5 Lf-mcg-Lf/0.5mL Syrg injection ADM 0.5ML IM UTD, Historical Med      lancets Misc 1 each by Misc.(Non-Drug; Combo Route) route once  daily., Starting Thu 12/6/2018, Print      lancing device Misc 1 Device by Misc.(Non-Drug; Combo Route) route 2 (two) times daily with meals., Starting Thu 2/1/2018, Until Fri 2/1/2019, Print      mupirocin (BACTROBAN) 2 % ointment Apply topically once daily. To areas of infection/inflammation on leg, Starting Fri 7/27/2018, Normal      PREVNAR 13, PF, 0.5 mL Syrg ADM 0.5ML IM UTD, Historical Med      !! warfarin (COUMADIN) 5 MG tablet Take 0.5-1 tablets (2.5-5 mg total) by mouth Daily. Per [facility], Starting Wed 1/16/2019, Normal       !! - Potential duplicate medications found. Please discuss with provider.          Diet:  Resume pre-operative diet    Activity:  Ad rosa    Follow-up:  Follow-up in clinic with Dr Villafana within 1-2 weeks; please call clinic nurse at

## 2019-02-01 NOTE — ED PROVIDER NOTES
Encounter Date: 2/1/2019       History     Chief Complaint   Patient presents with    Dizziness     Pt was at a clinic apt when she became dizzy and hypotensive (84/56mm/Hg). Pt is awake, alert and oriented x 3 upon arrival to the ED.      This is a 58 y.o.female with multiple coomorbidities presenting with fall that occurred just PTA.  Pt states she was coming out of her doctor's office at the wound clinic when she fell. States she gets dizzy easily when she gets up too fast, has frequent falls.  Denies loc, head injury or pain.  Pt does take coumadin.  Pt brought in w/ family for evaluation. Pt at this time denies any sx, states she feels fine and requesting to be discharge.  Pt states she felt dizzy because her oxygen take was empty.  Now she has a new tank she feels fine.  Pt last had HD yesterday, denies any recent illness.  Normally ambulates w/ walker.  Pt initially had low BP 84/56 per nursing staff, with improvement now.  Pt states she often gets low BP readings, especially when she gets up too fast. Pt states her BP has been low since she started on midodrine, was plannign to talk to her nephrologist about discontinuing the medication.  Accucheck 160.          Review of patient's allergies indicates:   Allergen Reactions    Bactrim [sulfamethoxazole-trimethoprim] Other (See Comments)     Causes renal failure    Nsaids (non-steroidal anti-inflammatory drug) Other (See Comments)     Renal failure     Past Medical History:   Diagnosis Date    Anemia in ESRD (end-stage renal disease) 3/7/2016    Anticoagulant long-term use     Cervical radiculopathy 7/20/2015    CHF (congestive heart failure)     Chronic combined systolic and diastolic heart failure 6/14/2013    EF 20% 2013, improved with Medical therapy, negative PET 2013    Chronic respiratory failure with hypoxia 04/22/2013    On home oxygen at 2-5 liters    Closed fracture of proximal end of right fibula 6/27/2016    Complications due to renal  dialysis device, implant, and graft     DDD (degenerative disc disease), lumbar 2015    Dependence on renal dialysis     Diabetes mellitus type II, controlled 2017    ESRD on hemodialysis 2016    Essential hypertension     Gout     Lateral meniscal tear 2016    Lumbar stenosis 2015    Obesity, Class III, BMI 40-49.9 (morbid obesity)     Pacemaker     Paroxysmal atrial fibrillation 2014    Not on anticoagulation    Peripheral neuropathy 2013    Personal history of gastric ulcer 3/19/2013    Sarcoidosis, lung 2009    Diagnosed in  with ocular manifestation, on 4L home O2 and PO steroids    Secondary pulmonary hypertension 2015    Seizure disorder 3/19/2013    Shingles 2013    Thyroid disease      Past Surgical History:   Procedure Laterality Date    ABDOMINAL SURGERY      ABLATION N/A 2017    Performed by [facility] at [facility] CATH LAB    ANGIOPLASTY Left 1/10/2018    Performed by [facility]rey [facility]afana [facility] at [facility] OR 2ND FLR    ANGIOPLASTY-PERCUTANEOUS TRANSLUMINAL (PTA) Left 2017    Performed by [facility] at [facility] OR 2ND FLR    ANGIOPLASTY-PERCUTANEOUS TRANSLUMINAL (PTA) Left 10/12/2016    Performed by [facility] at [facility] OR 2ND FLR    CARDIAC PACEMAKER PLACEMENT       SECTION      x2    DECLOT GRAFT PERCUTANEOUS Left 2017    Performed by [facility] at [facility] OR 2ND FLR    DECLOT-GRAFT Left 2016    Performed by [facility]jit Starr MD at [facility] CATH LAB    DECLOT-GRAFT Left 2016    [facility]formed by [facility] at [facility] CATH LAB    ECHOCARDIOGRAM-TRANSESOPHAGEAL N/A 2013    Performed by Delmy Surgeon at [facility] DELMY    fistulogram Left 1/10/2018    Performed by [facility] at [facility] OR 2ND FLR    FISTULOGRAM Left 2017    Per[facility] by [facility] at [facility] CATH LAB    FISTULOGRAM Left 10/12/2016    Performed by [facility]ana MD at [facility] OR  2ND FLR    FISTULOGRAM Left 2016    Performed by [facility] at [facility] CATH LAB    INJECTION-STEROID-EPIDURAL-TRANSFORAMINAL Right 2015    Per[facility] at [facility] MGT    INJECTION-STEROID-EPIDURAL-TRANSFORAMINAL Right 2015    Performed by [facility]rez [facility] at [facility] MGT    OADRGCECX-GCAXZ-CFGEZOMBVGRFG / Left upper AV graft. Left 2/3/2016    Performed by Tor[facility]ana [facility] at [facility] OR 2ND FLR    YNNSLFYQU-BEGDHVSVM-EUJQEYPEAUXMB N/A 2017    Performed by Bladimir Adorno MD at [facility] CATH LAB    OTHER SURGICAL HISTORY      loop recorder implant    PLACEMENT-STENT Left 2017    Performed by Tor[facility]afana MD at [facility] OR 2ND FLR    stent to fistula      TRANSESOPHAGEAL ECHOCARDIOGRAM (JARAD) N/A 2016    Performed by Sourav Machuca MD at [facility] CATH LAB    ULTRASOUND, UPPER GI TRACT, ENDOSCOPIC N/A 2014    Performed by Stewart Burgess MD at [facility] ENDO (2ND FLR)    VASCULAR SURGERY      fistula to L upper arm      Family History   Problem Relation Age of Onset    Kidney failure Mother     Hypertension Mother     Diabetes Mother     Coronary artery disease Father     Hypertension Father     Diabetes Father     Lupus Sister     Diabetes Maternal Grandmother     Colon cancer Neg Hx     Ovarian cancer Neg Hx     Breast cancer Neg Hx      Social History     Tobacco Use    Smoking status: Former Smoker     Packs/day: 0.50     Years: 10.00     Pack years: 5.00     Types: Cigarettes     Last attempt to quit: 1994     Years since quittin.7    Smokeless tobacco: Never Used   Substance Use Topics    Alcohol use: No     Alcohol/week: 0.0 oz     Comment: rarely    Drug use: No     Review of Systems   Constitutional: Negative for fatigue and fever.   Eyes: Negative for visual disturbance.   Respiratory: Negative for cough, chest tightness and shortness of breath.    Cardiovascular: Negative for chest pain, palpitations and leg  swelling.   Gastrointestinal: Negative for abdominal pain, blood in stool, diarrhea and nausea.   Genitourinary: Negative for flank pain.   Musculoskeletal: Negative for back pain, joint swelling and neck pain.   Skin: Negative for rash.   Neurological: Positive for dizziness (resolved).   Psychiatric/Behavioral: Negative for confusion.   All other systems reviewed and are negative.      Physical Exam     Initial Vitals [02/01/19 0820]   BP Pulse Resp Temp SpO2   101/64 71 16 97.6 °F (36.4 °C) 97 %      MAP       --         Physical Exam    Constitutional: She appears well-developed and well-nourished. She is not diaphoretic. No distress.   HENT:   Head: Normocephalic and atraumatic.   Nose: Nose normal.   Mouth/Throat: Oropharynx is clear and moist.   Eyes: EOM are normal. Pupils are equal, round, and reactive to light.   Neck: Normal range of motion. Neck supple.   Cardiovascular: Normal rate, regular rhythm, normal heart sounds and intact distal pulses.   Pulmonary/Chest: Breath sounds normal. No respiratory distress.   Musculoskeletal: Normal range of motion. She exhibits no edema or tenderness.   Neurological: She is alert and oriented to person, place, and time. She has normal strength. No cranial nerve deficit or sensory deficit. GCS score is 15. GCS eye subscore is 4. GCS verbal subscore is 5. GCS motor subscore is 6.   Skin: Skin is warm and dry.   Psychiatric: She has a normal mood and affect. Her behavior is normal. Judgment and thought content normal.         ED Course   Procedures  Labs Reviewed   POCT GLUCOSE - Abnormal; Notable for the following components:       Result Value    POCT Glucose 160 (*)     All other components within normal limits          Imaging Results    None          Medical Decision Making:   Initial Assessment:   This is an 58 y.o.female presenting with fall.  VSS at this time. Pt AAOX4, no acute distress.  No obvious signs of injury, no focal deficit.  Pt declining further  "evaluation, bloodwork and imaging, states she feels fine and wanting to leave.  Family at bedside, confirms that "this happens all the time" and that patient is fine.  They will be with patient to help monitor.  Discussed with patient usually in these circumstances I do like to do work up, ivis check inr, and imaging and more thorough exam.  Pt decline, states she needs to go to her eye doctor appointment.  Pt refusing to go to formal room for further evaluation and wants to leave from waiting room.  Able to ambulate with her walker without difficulties.  Discussed strict return instructions with patient and family, to return if any worsening sx, including pain, dizziness, altered MS, intractable n/v, or other concerning sx.  Pt and family understand and agree with plan.                      Clinical Impression:   Fall from standing      Dispo: discharge                           Jessica Neville DO  02/01/19 0923    "

## 2019-02-01 NOTE — CARE UPDATE
Rapid Response Nurse Note     Rapid Response Metrics:     Admit Date: 2019  LOS: 0  Code Status: Prior   Date of Consult: 2019  : 1960  Age: 58 y.o.  Weight:   Wt Readings from Last 1 Encounters:   19 116.6 kg (257 lb)     Sex: female  Race: Black or    Bed: ED :   MRN: [id number]  Time Rapid Response Team page Received: 0750  Time Rapid Response Team at Bedside: 0752  Time Rapid Response Team left Bedside: 0800  Was the patient discharged from an ICU this admission?   no  Was the patient discharged from a PACU within last 24 hours?  no  Did the patient receive conscious sedation/general anesthesia within last 24 hours?  no  Was the patient in the ED within the past 24 hours?  no  Was the patient started on NIPPV within the past 24 hours?  no  Did this progress into an ARC or CPA:  no  Attending Physician: No att. providers found  Primary Service: Networked reference to record PCT   Consult Requested By: No att. providers found   Rapid Response Indication(s): Syncopal Episode     SITUATION:     Reason for Call:   Called to evaluate the patient for Circulatory    BACKGROUND:     Why is the patient in the hospital?: Wound Care Appointment   What changed?: Pt became dizzy, weak, and was assisted to floor.     ASSESSMENT:     What did you find: Pt lying on floor. AAOx4. On 2 LNC. Unable to obtain BP reading. Pt placed on stretcher and transferred to ED.     RECOMMENDATIONS:     We recommend: Transfer ED    FOLLOW-UP/CONTINGENCY PLAN:     Patient needs a second visit at : n/a    Call the rapid response Nurse at x [phone] for additional questions or concerns.      PHYSICIAN ESCALATION:     Orders received and case discussed with Dr. Padilla and ED RN     Disposition: Tx to [facility], bed ED.

## 2019-02-01 NOTE — PROGRESS NOTES
Subjective:       Patient ID: Sisi Medel is a 58 y.o. female.    Chief Complaint: No chief complaint on file.    Patient is a 55 y.o.  female with a h/o HTN, HLD, CHF (EF 20%), COPD on home O2, paroxysmal Afib (on Coumadin), seizure disorder, ESRD on HD. The patient reports injuring her right lower leg approximately two months ago. She has used various ointments with no success. The patient presented to the ED on 12/30/2018 for discoloration and pain to the area. An arterial US was performed. Results as follows:  Ankle-brachial index of 1.28 on the right and 1.17 on the left. No hemodynamically significant stenosis demonstrated in the right or left lower extremity arterial system. Bilateral calf arteries demonstrate monophasic waveforms which may suggest distal vascular disease. Silver Alginate is being used for topical treatment with two layer compression therapy. The patient presented with several pimple like lesions to the right lower lateral leg on admission. Today they are draining thick yellow purulent drainage. The medial wound is healing by evidence of wound contraction.   Upon leaving the clinic, the patient was ambulating with walker, she became weak and started to fall to the floor. She was guided down to a lying position. Patient remains awake and alert during the event. Unable to get a BP at first, then 80/40's. O2 sat 86%. She is on O2 at 2L normally. Increased her O2 to 5L NC. The patient was transported to the ER per stretcher with attendants in stable condition. Her  is here.         Review of Systems   Constitutional: Positive for fatigue. Negative for chills, diaphoresis and fever.   HENT: Negative for ear pain, nosebleeds, sinus pain, sore throat and trouble swallowing.    Eyes: Negative for pain and redness.   Respiratory: Negative for cough, shortness of breath and wheezing.    Cardiovascular: Positive for leg swelling. Negative for chest pain and  palpitations.   Gastrointestinal: Negative for abdominal distention, abdominal pain, blood in stool, nausea and vomiting.   Endocrine: Negative for polydipsia, polyphagia and polyuria.   Genitourinary: Negative for flank pain and hematuria.   Musculoskeletal: Positive for arthralgias. Negative for back pain, joint swelling and myalgias.   Skin: Wound: Right lower leg.   Neurological: Positive for weakness. Negative for seizures, facial asymmetry and headaches.   Hematological: Negative for adenopathy. Bruises/bleeds easily.   Psychiatric/Behavioral: Negative for agitation, dysphoric mood and sleep disturbance. The patient is not nervous/anxious.        Objective:      Physical Exam   Constitutional: She is oriented to person, place, and time. Vital signs are normal. No distress.   Obese female   HENT:   Head: Normocephalic.   Mouth/Throat: Oropharynx is clear and moist.   Eyes: Conjunctivae, EOM and lids are normal. Pupils are equal, round, and reactive to light.   Neck: Trachea normal and normal range of motion. Carotid bruit is not present. No thyromegaly present.   Cardiovascular: Normal rate, regular rhythm and intact distal pulses.   Pulses:       Dorsalis pedis pulses are 2+ on the right side, and 2+ on the left side.   Pulmonary/Chest: Effort normal and breath sounds normal. No respiratory distress. She has no wheezes. She has no rales.   Abdominal: Soft. Bowel sounds are normal. She exhibits no distension. There is no tenderness.   Musculoskeletal: Normal range of motion.        Right lower leg: She exhibits edema.        Legs:  Lymphadenopathy:     She has no cervical adenopathy.   Neurological: She is alert and oriented to person, place, and time.   Skin: Skin is warm and dry. Capillary refill takes 2 to 3 seconds. She is not diaphoretic. No erythema.   Psychiatric: She has a normal mood and affect. Thought content normal.   Vitals reviewed.      Right lateral leg        Right medial leg        Sisi was  seen in the clinic room and placed in the supine position on the treatment table.  The coflex bandage was removed with scissors and the leg was cleansed with Easi-clense sponges and dried thoroughly.  Eucerin cream was applied to the lower legs. A silver alginate dressing was applied to the wounds.  The patient's foot was positioned at a 90 degree angle.  The coflex was applied per package instructions.  A two layered application was performed using a spiral technique beginning with the foam layer followed by the cohesive bandage avoiding creases or folds.  The wrap was started behind the first metatarsal and ended below the tibial tubercle of the knee.  There was overlap of each turn half the width of the previous turn.  The compression wrap will be changed every 7 days.  A wound culture was obtained following cleaning of the right lateral leg wounds.     Lower Extremity US 12/30/2018: Ankle-brachial index of 1.28 on the right and 1.17 on the left.  No hemodynamically significant stenosis demonstrated in the right or left lower extremity arterial system. Bilateral calf arteries demonstrate monophasic waveforms which may suggest distal vascular disease.  Assessment:       1. Ulcer of right lower extremity with fat layer exposed    2. PVD (peripheral vascular disease)        Plan:       Two layer compression, Coflex  Apply Medihoney to ulcer and right lateral leg lesions  Cover with border dressing  Follow up in one week  Avoid getting dressing wet  Wound culture    Patient was warned not to get the dressings wet and to use cast covers for showering.  Should the dressing become wet, he is to remove it, place a wet-to-dry dressing over the wound, cover with gauze and roll gauze and use ace wraps for compression and to secure bandages.  He should then notify this office as soon as possible to have a new dressing applied.

## 2019-02-01 NOTE — PLAN OF CARE
Problem: Spiritual Distress Risk or Actual  Goal: Spiritual Wellbeing    Intervention: Promote Spiritual Wellbeing  Provided initial visit at time of [employer] call in clinic. Offered pastoral support with pt's significant other. Escorted him to ED where pt was brought. Also provided compassionate presence and reflective listening with pt as she waited for ED admit. No further needs expressed at this time. Pt and significant other made aware of 's presence as needed.

## 2019-02-01 NOTE — ED NOTES
Pt assessed in hallway by physician, pt has no complaints at this time, pt stable and ready for discharge. Patient and family verbalized understanding to follow up return to ED if symptoms return.

## 2019-02-04 NOTE — TELEPHONE ENCOUNTER
On review, noted patient is overdue for follow up with Endocrine (missed last visit) for diabetes and adrenal insufficiency. Please contact to help schedule.

## 2019-02-06 NOTE — PATIENT INSTRUCTIONS
Elevate legs as much as possible. Do not get the dressings wet and use cast covers for showering.  Should the dressing become wet, remove it, place a wet-to-dry dressing over the wound, cover with gauze and roll gauze and use ace wraps for compression and to secure bandages.  Notify clinic as soon as possible to have a new dressing applied.    Return to clinic in one week  Continue antibiotics as directed  Preparing for discharge

## 2019-02-06 NOTE — PROGRESS NOTES
Subjective:       Patient ID: Sisi Medel is a 58 y.o. female.    Chief Complaint: Wound Check    Patient is a 55 y.o.  female with a h/o HTN, HLD, CHF (EF 20%), COPD on home O2, paroxysmal Afib (on Coumadin), seizure disorder, ESRD on HD. The patient reports injuring her right lower leg approximately two months ago. She has used various ointments with no success. The patient presented to the ED on 12/30/2018 for discoloration and pain to the area. An arterial US was performed. Results as follows:  Ankle-brachial index of 1.28 on the right and 1.17 on the left. No hemodynamically significant stenosis demonstrated in the right or left lower extremity arterial system. Bilateral calf arteries demonstrate monophasic waveforms which may suggest distal vascular disease. Silver Alginate is being used for topical treatment with two layer compression therapy. The wound is healed. Pimple like lesions to the right lower and lateral leg still open and draining small amounts of thick yellow drainage. She is taking Doxy oral. Cultures were negative. Preparing for discharge.      Upon leaving the clinic on 02/01/2019, the patient was ambulating with walker, she became weak and started to fall to the floor. She was guided down to a lying position. Patient remains awake and alert during the event. Unable to get a BP at first, then 80/40's. O2 sat 86%. She is on O2 at 2L normally. Increased her O2 to 5L NC. The patient was transported to the ER per stretcher with attendants in stable condition. Her  is here.         Review of Systems   Constitutional: Positive for fatigue. Negative for chills, diaphoresis and fever.   HENT: Negative for ear pain, nosebleeds, sinus pain, sore throat and trouble swallowing.    Eyes: Negative for pain and redness.   Respiratory: Negative for cough, shortness of breath and wheezing.    Cardiovascular: Positive for leg swelling. Negative for chest pain and palpitations.    Gastrointestinal: Negative for abdominal distention, abdominal pain, blood in stool, nausea and vomiting.   Endocrine: Negative for polydipsia, polyphagia and polyuria.   Genitourinary: Negative for flank pain and hematuria.   Musculoskeletal: Positive for arthralgias. Negative for back pain, joint swelling and myalgias.   Skin: Wound: Right lower leg.   Neurological: Positive for weakness. Negative for seizures, facial asymmetry and headaches.   Hematological: Negative for adenopathy. Bruises/bleeds easily.   Psychiatric/Behavioral: Negative for agitation, dysphoric mood and sleep disturbance. The patient is not nervous/anxious.        Objective:      Physical Exam   Constitutional: She is oriented to person, place, and time. Vital signs are normal. No distress.   Obese female   HENT:   Head: Normocephalic.   Mouth/Throat: Oropharynx is clear and moist.   Eyes: Conjunctivae, EOM and lids are normal. Pupils are equal, round, and reactive to light.   Neck: Trachea normal and normal range of motion. Carotid bruit is not present. No thyromegaly present.   Cardiovascular: Normal rate, regular rhythm and intact distal pulses.   Pulses:       Dorsalis pedis pulses are 2+ on the right side, and 2+ on the left side.   Pulmonary/Chest: Effort normal and breath sounds normal. No respiratory distress. She has no wheezes. She has no rales.   Abdominal: Soft. Bowel sounds are normal. She exhibits no distension. There is no tenderness.   Musculoskeletal: Normal range of motion.        Right lower leg: She exhibits edema.        Legs:  Lymphadenopathy:     She has no cervical adenopathy.   Neurological: She is alert and oriented to person, place, and time.   Skin: Skin is warm and dry. Capillary refill takes 2 to 3 seconds. She is not diaphoretic. No erythema.   Psychiatric: She has a normal mood and affect. Thought content normal.   Vitals reviewed.      Right medial leg          Sisi was seen in the clinic room and placed  in the supine position on the treatment table.  The coflex bandage was removed with scissors and the leg was cleansed with Easi-clense sponges and dried thoroughly.  Eucerin cream was applied to the lower legs. A silver alginate dressing was applied to the wounds.  The patient's foot was positioned at a 90 degree angle.  The coflex was applied per package instructions.  A two layered application was performed using a spiral technique beginning with the foam layer followed by the cohesive bandage avoiding creases or folds.  The wrap was started behind the first metatarsal and ended below the tibial tubercle of the knee.  There was overlap of each turn half the width of the previous turn.  The compression wrap will be changed every 7 days.    Lower Extremity US 12/30/2018: Ankle-brachial index of 1.28 on the right and 1.17 on the left.  No hemodynamically significant stenosis demonstrated in the right or left lower extremity arterial system. Bilateral calf arteries demonstrate monophasic waveforms which may suggest distal vascular disease.  Assessment:       1. Ulcer of right lower extremity with fat layer exposed    2. PVD (peripheral vascular disease)        Plan:       Two layer compression, Coflex  Apply alginate to wounds  Cover with border dressing  Follow up in one week  Preparing for discharge    Patient was warned not to get the dressings wet and to use cast covers for showering.  Should the dressing become wet, he is to remove it, place a wet-to-dry dressing over the wound, cover with gauze and roll gauze and use ace wraps for compression and to secure bandages.  He should then notify this office as soon as possible to have a new dressing applied.

## 2019-02-15 NOTE — PROGRESS NOTES
Subjective:       Patient ID: Sisi Medel is a 58 y.o. female.    Chief Complaint: Wound Check    Patient is a 55 y.o.  female with a h/o HTN, HLD, CHF (EF 20%), COPD on home O2, paroxysmal Afib (on Coumadin), seizure disorder, ESRD on HD. The patient reports injuring her right lower leg approximately two months ago. She has used various ointments with no success. The patient presented to the ED on 12/30/2018 for discoloration and pain to the area. She is afebrile. She denies increased redness, swelling or purulent drainage.  Silver alginate and a two layer compression wrap was placed on the last visit.  She does not have ulcers, just pustules. Her pain level is 7/10.      Review of Systems   Constitutional: Positive for fatigue. Negative for chills, diaphoresis and fever.   HENT: Negative for ear pain, nosebleeds, sinus pain, sore throat and trouble swallowing.    Eyes: Negative for pain and redness.   Respiratory: Negative for cough, shortness of breath and wheezing.    Cardiovascular: Positive for leg swelling. Negative for chest pain and palpitations.   Gastrointestinal: Negative for abdominal distention, abdominal pain, blood in stool, nausea and vomiting.   Endocrine: Negative for polydipsia, polyphagia and polyuria.   Genitourinary: Negative for flank pain and hematuria.   Musculoskeletal: Positive for arthralgias. Negative for back pain, joint swelling and myalgias.   Skin: Wound: Right lower leg.   Neurological: Positive for weakness. Negative for seizures, facial asymmetry and headaches.   Hematological: Negative for adenopathy. Bruises/bleeds easily.   Psychiatric/Behavioral: Negative for agitation, dysphoric mood and sleep disturbance. The patient is not nervous/anxious.        Objective:      Physical Exam   Constitutional: She is oriented to person, place, and time. Vital signs are normal. No distress.   Obese female   HENT:   Head: Normocephalic.   Eyes: Lids are normal.    Neck: Trachea normal. Carotid bruit is not present.   Cardiovascular: Intact distal pulses.   Pulses:       Dorsalis pedis pulses are 2+ on the right side, and 2+ on the left side.   Musculoskeletal: Normal range of motion.        Right lower leg: She exhibits edema.        Legs:  Neurological: She is alert and oriented to person, place, and time.   Skin: Skin is warm and dry. Capillary refill takes 2 to 3 seconds. She is not diaphoretic. No erythema.   Psychiatric: She has a normal mood and affect. Thought content normal.   Vitals reviewed.      Lower Extremity US 12/30/2018: Ankle-brachial index of 1.28 on the right and 1.17 on the left.  No hemodynamically significant stenosis demonstrated in the right or left lower extremity arterial system. Bilateral calf arteries demonstrate monophasic waveforms which may suggest distal vascular disease.  Assessment:       1. Ulcer of right lower extremity with fat layer exposed        Plan:       Apply bactroban twice daily to pustules right lower leg, cover with gauze and secure with roll gauze.  Return to clinic in 3-4 weeks.

## 2019-02-15 NOTE — PATIENT INSTRUCTIONS
Mupirocin skin cream or ointment  What is this medicine?  MUPIROCIN (myoo PEER oh sin) is an antibiotic. It is used on the skin to treat skin infections.  How should I use this medicine?  This medicine is for external use only. Follow the directions on the prescription label. Wash your hands before and after use. Before applying, wash the affected area with mild soap and water and pat dry. Apply a small amount to the affected area and rub gently. You can cover the area with a gauze dressing. Do not get this medicine in your eyes. If you do, rinse out with plenty of cool tap water. Do not use your medicine more often than directed. Finish the full course of medicine prescribed by your doctor or health care professional even if you think your condition is better. Do not use over large areas of burnt skin.  Talk to your pediatrician regarding the use of this medicine in children. Special care may be needed.  What side effects may I notice from receiving this medicine?  Side effects that you should report to your doctor or health care professional as soon as possible:  · skin rash, redness, continued swelling, burning, itching, stinging, or pain  Side effects that usually do not require medical attention (report to your doctor or health care professional if they continue or are bothersome):  · dry skin, itching  What may interact with this medicine?  Interactions are not expected. Do not use any other skin products on the affected area without telling your doctor or health care professional.  What if I miss a dose?  If you miss a dose, take it as soon as you can. If it is almost time for your next dose, take only that dose. Do not take double or extra doses.  Where should I keep my medicine?  Keep out of the reach of children.  Store at room temperature between 20 and 25 degrees C (68 and 77 degrees F). Throw away any unused medicine after the expiration date.  What should I tell my health care provider before I take  this medicine?  They need to know if you have any of these conditions:  · an unusual or allergic reaction to mupirocin, polyethylene glycol (PEG), or other topical antibiotic medicine  · pregnant or trying to get pregnant  · breast-feeding  What should I watch for while using this medicine?  Tell your doctor or health care professional if your skin condition does not begin to improve within 3 to 5 days.  NOTE:This sheet is a summary. It may not cover all possible information. If you have questions about this medicine, talk to your doctor, pharmacist, or health care provider. Copyright© 2017 [employer]    Apply mupiprocin twice daily to affected area on lower leg.      Do not shave legs.    Cover with a dry gauze dressing if draining.

## 2019-02-20 NOTE — PROGRESS NOTES
INR subtherapeutic today. Patient states that she she finished Doxycycline last week. She did not report it. I reminded her to call clinic with all medication changes.  She reports increased swelling to her right foot related to wound. She denies any bleeding, bruising or other changes. We will boost her dose today then resume and follow up next week. Patient was re-educated on situations that would require placing a call to the Coumadin  Clinic, including bleeding or unusual bruising issues, changes in health, diet or medications,upcoming procedures that require warfarin interruption, and missed Coumadin dose(s). Patient expressed understanding that avoidance of consistency with these parameters could cause fluctuations in INR, leading to more frequent visits and increase risk of adverse events. Care plan made with BARRY Gordillo D

## 2019-02-20 NOTE — PROGRESS NOTES
[facility] - NEUROLOGY  [facility] [place]    Date: February 20, 2019   Patient Name: Sisi Medel   MRN: [id number]   PCP: Carlos A Caputo  Referring Provider: Emeka Lester MD    Assessment:      This is Sisi Medel, 58 y.o. female with complex medical problems including AF on coumadin, pulmonary sarcoid, ESRD on HD with orthostasis and recurrent syncope around dialysis who presents for follow up for her epilepsy.  She has symptoms of painful polyneuropathy noted on EMG 2014, recent A1C elevated most likely history of glucose intolerance from long term steroid use verus sarcoid versus renal disease.      She has recently developed worsening painless proximal low extremity weakness with falls concerning for sarcoid v steroid myopathy.     Plan:      -  EMG, will need to hold coumadin  -  Increase PBG 100mg bid->tid  -  Compound cream  -  Continue LEV     Follow up 3-4 months     2014 EPILEPSY QUALITY MEASUREMENT (AAN)  1 a. Seizure Frequency: as noted  1b. Seizure Intervention: as noted  2.  a. Etiology: as noted  b. Seizure Type: as noted  c. Epilepsy Syndrome: n/a  3.  a. Side-effects of AED: as noted   b. Intervention for side-effects: as noted  4. Screening for psychiatric or behavioral disorders: as noted  5. Personalized epilepsy safety issues and education: yes  6. Counseling for women of child-bearing potential and epilepsy: yes   7. Referral of treatment-resistant epilepsy to comprehensive epilepsy center (q 2 years): N/A.    The patient was asked to call me/the clinic 1 week after the test(s) are done to obtain results.    The following issues were  all discussed in detail with the patient and family/caregiver(s):    1. Diagnosis, plans, prognosis, medications and possible side-effects, risks and benefits of treatment, other alternatives to AEDs.  2. Risks related to continued seizures, status epilepticus, SUDEP, driving restrictions and seizure  precautions ( no baths but showers are ok, no swimming unsupervised, no use of heavy machinery, no use of sharp moving objects, avoid heights).   3. Issues related to pregnancy, OCP and breast feeding as it relates to epilepsy.  4. The potential of teratogenicity and suicidal risks of anti-epileptic medications.  5.Avoid any activity that compromise patient safety related to seizures.     Questions and concerns raised by the patient and family/care-giver(s) were addressed and they indicated understanding of everything discussed and agreed to plans as above.     I discussed side effects of the medications. I asked the patient to stop the medication if she notices serious adverse effects as we discussed and to seek immediate medical attention at an ER.     Emeka Lester MD  [facility]   Department of Neurology    Subjective:   Some benefit of PGB>GBP noted, has not been able to obtain topical; has developed low extremity weakness since last visit, no back pain    11/2018  No benefit of pamelor and noted discoloration of feet, continues GBP 600mg bid, prior benefit of PGB  8/2018  No improvement with increased GBP  5/2018  Seizures remain controlled, main concern is neuropathic pain in feet  Developed painful wounds on feet 4/2018 and was told to stop doxepin cream with some healing since - currently reports questionable benefit from doxepin to begin with  Never started pamelor and currently taking GBP 300mg bid    12/2017  Improvement with compound cream, feels she is more mobile but notes it dries skin; did not start pamelor; planning to visit son in [place] for [place]    [facility] 9/2017:   Ms. Sisi Medel is a 58 y.o. female who presents for follow up for epilepsy  Her seizures remain controlled and her main concern today is painful paresthesias in distal low extremities which started on right where she had shingles.  No relief from GBP, lidocaine/capsaicin cream.    PAST MEDICAL HISTORY:  Past  Medical History:   Diagnosis Date    Anemia in ESRD (end-stage renal disease) 3/7/2016    Anticoagulant long-term use     Cervical radiculopathy 2015    CHF (congestive heart failure)     Chronic combined systolic and diastolic heart failure 2013    EF 20% , improved with Medical therapy, negative PET     Chronic respiratory failure with hypoxia 2013    On home oxygen at 2-5 liters    Closed fracture of proximal end of right fibula 2016    Complications due to renal dialysis device, implant, and graft     DDD (degenerative disc disease), lumbar 2015    Dependence on renal dialysis     Diabetes mellitus type II, controlled 2017    ESRD on hemodialysis 2016    Essential hypertension     Gout     Lateral meniscal tear 2016    Lumbar stenosis 2015    Obesity, Class III, BMI 40-49.9 (morbid obesity)     Pacemaker     Paroxysmal atrial fibrillation 2014    Not on anticoagulation    Peripheral neuropathy 2013    Personal history of gastric ulcer 3/19/2013    Sarcoidosis, lung 2009    Diagnosed in  with ocular manifestation, on 4L home O2 and PO steroids    Secondary pulmonary hypertension 2015    Seizure disorder 3/19/2013    Shingles 2013    Thyroid disease        PAST SURGICAL HISTORY:  Past Surgical History:   Procedure Laterality Date    ABDOMINAL SURGERY      ABLATION N/A 2017    Per[facility] C[facility] LAB    ANGIOPLASTY Left 1/10/2018    Performed by Torrey [facility]afana [facility] at [facility] OR 2ND FLR    ANGIOPLASTY-PERCUTANEOUS TRANSLUMINAL (PTA) Left 2017    Performed by [facility] at [facility] OR 2ND FLR    ANGIOPLASTY-PERCUTANEOUS TRANSLUMINAL (PTA) Left 10/12/2016    Performed by [facility] at [facility] OR 2ND FLR    CARDIAC PACEMAKER PLACEMENT       SECTION      x2    DECLOT GRAFT PERCUTANEOUS Left 2017    Performed by [facility] at [facility]  OR 2ND FLR    DECLOT-GRAFT Left 6/21/2016    Performed by [facility]it [facility]arr [facility] at [facility] CATH LAB    DECLOT-GRAFT Left 6/20/2016    Performed by [facility] at [facility] CATH LAB    ECHOCARDIOGRAM-TRANSESOPHAGEAL N/A 6/27/2013    Performed by Delmy Surgeon at [facility] DELMY    fistulogram Left 1/10/2018    Performed by [facility] MD at [facility] OR 2ND FLR    FISTULOGRAM Left 9/13/2017    Performed by [facility] MD at [facility] CATH LAB    FISTULOGRAM Left 10/12/2016    Performed by [facility] at [facility] OR 2ND FLR    FISTULOGRAM Left 6/21/2016    Performed by [facility]jit Starr MD at [facility] CATH LAB    Fistulogram, LUE AVG, possible intervention Left 1/30/2019    Performed by [facility]ana MD at [facility] CATH LAB    INJECTION-STEROID-EPIDURAL-TRANSFORAMINAL Right 7/20/2015    Performed by Pat[facility]rez [facility] at [facility] PAIN MGT    INJECTION-STEROID-EPIDURAL-TRANSFORAMINAL Right 5/21/2015    Performed by Pat[facility]rez MD at [facility] PAIN MGT    [id number]-MVGFK-[id number] / Left upper AV graft. Left 2/3/2016    Performed by [facility] at [facility] OR 2ND FLR    RGLACVAMP-LKIMFWYXM-FSTKRACYQJHQG N/A 1/11/2017    Performed by Bladimir Adorno MD at [facility] CATH LAB    OTHER SURGICAL HISTORY      loop recorder implant    PLACEMENT-STENT Left 2/7/2017    Performed by [facility] MD at [facility] OR 2ND FLR    PTA, AV FISTULA N/A 1/30/2019    Performed by [facility]ana MD at [facility] CATH LAB    stent to fistula      TRANSESOPHAGEAL ECHOCARDIOGRAM (JARAD) N/A 6/27/2016    Performed by Sourav Machuca MD at [facility] CATH LAB    ULTRASOUND, UPPER GI TRACT, ENDOSCOPIC N/A 1/9/2014    Performed by Stewart Burgess MD at [facility] ENDO (2ND FLR)    VASCULAR SURGERY      fistula to L upper arm        CURRENT MEDS:  Current Outpatient Medications   Medication Sig Dispense Refill    aspirin (ECOTRIN) 81 MG EC tablet Take 81 mg by mouth once daily.      atorvastatin (LIPITOR) 80 MG tablet TAKE 1 TABLET  BY MOUTH EVERY EVENING 90 tablet 0    betamethasone dipropionate (DIPROLENE) 0.05 % ointment AAA bid hand under occlusion 45 g 3    blood sugar diagnostic Strp 1 each by Misc.(Non-Drug; Combo Route) route once daily. 100 each 3    blood-glucose meter kit Use as instructed 1 each 0    BOOSTRIX TDAP 2.5-8-5 Lf-mcg-Lf/0.5mL Syrg injection ADM 0.5ML IM UTD  0    ciclopirox (PENLAC) 8 % Soln Apply daily to affected nail. Must remove and restart weekly 1 Bottle 5    clopidogrel (PLAVIX) 75 mg tablet Take 1 tablet (75 mg total) by mouth once daily. 30 tablet 11    fludrocortisone (FLORINEF) 0.1 mg Tab Take 100 mcg by mouth 2 (two) times daily.      gabapentin (NEURONTIN) 600 MG tablet TAKE 1 TABLET(600 MG) BY MOUTH TWICE DAILY 180 tablet 1    ketorolac 0.5% (ACULAR) 0.5 % Drop instill one drop into both eyes every morning  3    lancets Misc 1 each by Misc.(Non-Drug; Combo Route) route once daily. 100 each 3    levETIRAcetam (KEPPRA) 500 MG Tab Take 1 tablet (500 mg total) by mouth 2 (two) times daily. 180 tablet 3    midodrine (PROAMATINE) 5 MG Tab       mupirocin (BACTROBAN) 2 % ointment Apply topically 2 (two) times daily. To areas of infection/inflammation on leg 30 g 1    nortriptyline (PAMELOR) 25 MG capsule TK 1 C PO QPM  1    omeprazole (PRILOSEC) 20 MG capsule TAKE 1 CAPSULE BY MOUTH EVERY DAY 90 capsule 4    prednisoLONE acetate (PRED FORTE) 1 % DrpS INSTILL ONE DROP INTO  LEFT EYE THREE TIMES A DAY  3    predniSONE (DELTASONE) 20 MG tablet Take 1 tablet (20 mg total) by mouth once daily. 30 tablet 2    pregabalin (LYRICA) 100 MG capsule Take 1 capsule (100 mg total) by mouth 2 (two) times daily. 180 capsule 1    PREVNAR 13, PF, 0.5 mL Syrg ADM 0.5ML IM UTD  0    DENISE-LINDA 0.8 mg Tab TK 1 T PO QD  0    RENVELA 800 mg Tab TAKE 1 TABLET BY MOUTH THREE TIMES DAILY WITH MEALS 90 tablet 0    sodium polystyrene (KAYEXALATE) 15 gram/60 mL Susp Take 60 mLs (15 g total) by mouth daily as needed. Take  when directed by your doctor 60 mL 11    terbinafine HCl (LAMISIL) 250 mg tablet Take 250 mg by mouth once daily.       tiZANidine (ZANAFLEX) 4 MG tablet TAKE 1 TABLET BY MOUTH EVERY DAY AS NEEDED FOR MUSCLE SPASMS 90 tablet 0    TRADJENTA 5 mg Tab tablet TAKE 1 TABLET(5 MG) BY MOUTH EVERY DAY 90 tablet 0    warfarin (COUMADIN) 5 MG tablet TAKE 1 TABLET BY MOUTH EVERY DAY EXCEPT TH 1AND /2 TABLETS ON MONDAY AND FRIDAY OR AS DIRECTED 120 tablet 0    warfarin (COUMADIN) 5 MG tablet Take 0.5-1 tablets (2.5-5 mg total) by mouth Daily. Per [facility] Clinic 90 tablet 3    lancing device Misc 1 Device by Misc.(Non-Drug; Combo Route) route 2 (two) times daily with meals. 1 each 0     No current facility-administered medications for this visit.        ALLERGIES:  Review of patient's allergies indicates:   Allergen Reactions    Bactrim [sulfamethoxazole-trimethoprim] Other (See Comments)     Causes renal failure    Nsaids (non-steroidal anti-inflammatory drug) Other (See Comments)     Renal failure       FAMILY HISTORY:  Family History   Problem Relation Age of Onset    Kidney failure Mother     Hypertension Mother     Diabetes Mother     Coronary artery disease Father     Hypertension Father     Diabetes Father     Lupus Sister     Diabetes Maternal Grandmother     Colon cancer Neg Hx     Ovarian cancer Neg Hx     Breast cancer Neg Hx        SOCIAL HISTORY:  Social History     Tobacco Use    Smoking status: Former Smoker     Packs/day: 0.50     Years: 10.00     Pack years: 5.00     Types: Cigarettes     Last attempt to quit: 1994     Years since quittin.8    Smokeless tobacco: Never Used   Substance Use Topics    Alcohol use: No     Alcohol/week: 0.0 oz     Comment: rarely    Drug use: No       Review of Systems:  12 review of systems is negative except for the symptoms mentioned in HPI.        Objective:     Vitals:    19 1108   BP: 120/82   Pulse: 69   Weight: 120 kg (264 lb 8.8 oz)  "  Height: 5' 11" (1.803 m)       General: NAD, well nourished   Eyes: no tearing, discharge, no erythema   ENT: moist mucous membranes of the oral cavity, nares patent    Neck: Supple, full range of motion  Cardiovascular: Warm and well perfused, pulses equal and symmetrical  Lungs: on O2 with increased work of breathing  Psychiatry: Mood and affect are appropriate   Abdomen: soft, non tender, non distended  Extremeties: right foot bandaged, small healing sores in bilateral thumb-index crease.    Neurological   MENTAL STATUS: Alert and oriented to person, place, and time. Attention and concentration within normal limits. Speech without dysarthria, able to name and repeat without difficulty. Recent and remote memory within normal limits   CRANIAL NERVES: Visual fields intact. PERRL. EOMI. Facial sensation intact. Face symmetrical. Hearing grossly intact. Full shoulder shrug bilaterally. Tongue protrudes midline   SENSORY: Sensation is decreased to light touch in the feet.  Decreased vibration past the ankle  MOTOR:  Some atrophy noted in right quadricept with right leg 4/5 proximal and 4+/5 distal, left 4+/5 throughout  CEREBELLAR/COORDINATION/GAIT: Heel to shin intact. Finger to nose intact. Normal rapid alternating movements.  Able to stand with broad base, presents with walker and remains able to ambulate with support  "

## 2019-02-20 NOTE — PROGRESS NOTES
Sisi Medel  2/20/2019    HPI:  Patient is a 55 y.o.  female with a h/o HTN, HLD, CHF (EF 20%), COPD on home O2, paroxysmal Afib (on Coumadin), seizure disorder, ESRD on HD TThS via LUE AVG (placed 2/3/16) who presents to clinic today for follow up from PTA dilation of LUE AVG in late January 2019.    Doing well. No issues with dialysis. No elevated venous pressures.    Patient continues to have pain from the right dorsal foot to lower calf, some pain in left ankle as well. She also has numbness and she has some rest pain, and resting her legs does not help. She's noticed hyperpigmentation and ulceration as well on the right lower calf. The ulcer is not healing.    She has been undergoing RLE venous ulcer care in our wound clinic for the past several weeks.    Patient takes ASA, statin, Plavix, coumadin.    S/p   2/3/16: LUE AVG creation (Dr Villafana)  6/21/16: Percutaneous mechanical thrombectomy w Possis Angiojet AVX; 4fr OTW embolectomy of arterial inflow   PTA outflow stenosis with a 7x40 mm balloon  10/12/16: fistulogram (75% stenosis noted), left upper extremity AV graft PTA venous outflow with resolution of stenosis noted  2/2017 Declot and venous outflow PTA and stent with 8 x 50 VIABAHN  5/15/2017: PTA outflow stenosis (8x60 mustang); Stent outflow stenosis (8x50 Viabahn)  10/12/17: PTA LUE AV graft (brachial vein) x2: (7x60 Edwards); (8x40 Gazelle)   01/10/18: PTA outflow stensois (8x40 Gazelle) and (9x40 Gazelle)   2/2/18: PTA arterial inflow (7 x 40 Gazelle)  8/2018: 1.  PTA outflow stenosis 8 x 40 Gazelle; PTA outflow 10 x 40 Gazelle balloon.  1/2019: PTA LUE AVG outflow 9 x 60 Lutonix Drug Coated Balloon and 8 x 40 Gazelle    No MI/stroke  Tobacco use: Former smoker     Past Medical History   Diagnosis Date    Anemia in ESRD (end-stage renal disease) 3/7/2016    Cervical radiculopathy 7/20/2015    Chronic combined systolic and diastolic heart failure 6/14/2013     EF 20% 2013,  improved with Medical therapy, negative PET     Chronic respiratory failure with hypoxia 2013     On home oxygen at 2-5 liters    Chronic rhinitis 10/2/2013    DDD (degenerative disc disease), lumbar 2015    ESRD on hemodialysis 2016    Essential hypertension     Gout     Hyperlipidemia 3/7/2016    Lateral meniscal tear 2016    Lumbar stenosis 2015    Morbid obesity 8/15/2013    Paroxysmal atrial fibrillation 2014     Not on anticoagulation    Peripheral neuropathy 2013    Personal history of fall 2014    Personal history of gastric ulcer 3/19/2013    Sarcoidosis, lung 2009     Diagnosed in  with ocular manifestation, on 4L home O2 and PO steroids    Secondary pulmonary hypertension 2015    Seizure disorder 3/19/2013    Shingles 2013    Spondylarthrosis 2015     Past Surgical History   Procedure Laterality Date     section, classic      Abdominal surgery      Vascular surgery       Family History   Problem Relation Age of Onset    Kidney failure Mother     Hypertension Mother     Diabetes Mother     Coronary artery disease Father     Hypertension Father     Diabetes Father     Lupus Sister     Diabetes Maternal Grandmother     Asthma Neg Hx     Emphysema Neg Hx     Melanoma Neg Hx     Psoriasis Neg Hx      Social History     Social History    Marital status: Single     Spouse name: N/A    Number of children: N/A    Years of education: N/A     Occupational History    Not on file.     Social History Main Topics    Smoking status: Former Smoker     Packs/day: 0.50     Years: 10.00     Types: Cigarettes     Quit date: 1994    Smokeless tobacco: Never Used    Alcohol use No    Drug use: No    Sexual activity: No     Other Topics Concern    Not on file     Social History Narrative    Lives with boyfriend/partner who helps with her care.     Plans to travel to [place] for 2 weeks in 2016     Current  Outpatient Prescriptions on File Prior to Visit   Medication Sig    ACZONE 5 % topical gel Apply topically once daily.     allopurinol (ZYLOPRIM) 100 MG tablet Take 1 tablet (100 mg total) by mouth once daily.    amiodarone (PACERONE) 200 MG Tab Take 1 tablet (200 mg total) by mouth every morning.    ammonium lactate (LAC-HYDRIN) 12 % lotion Apply topically as needed (for scars on arms).     aspirin 81 MG Chew Take 81 mg by mouth every morning.    atorvastatin (LIPITOR) 80 MG tablet Take 80 mg by mouth once daily.     capsicum 0.075% (ZOSTRIX) 0.075 % topical cream Apply topically 3 (three) times daily. For neuropathic pain of feet    gabapentin (NEURONTIN) 100 MG capsule TAKE ONE CAPSULE BY MOUTH THREE TIMES DAILY (Patient taking differently: TAKE ONE CAPSULE BY MOUTH THREE TIMES DAILY-noon, evening, bedtime)    levetiracetam (KEPPRA) 500 MG Tab TAKE 1 TABLET BY MOUTH TWICE DAILY.    melatonin 5 mg Tab Take 1 tablet by mouth nightly.    midodrine (PROAMATINE) 10 MG tablet     midodrine (PROAMATINE) 5 MG Tab     NEPHRO-LINDA 0.8 mg Tab TK 1 T PO  QD    ondansetron (ZOFRAN-ODT) 8 MG TbDL Take 1 tablet (8 mg total) by mouth every 8 (eight) hours as needed.    oxycodone-acetaminophen (PERCOCET) 7.5-325 mg per tablet Take 1 tablet by mouth every 4 (four) hours as needed.    predniSONE (DELTASONE) 10 MG tablet TAKE 3 TABLETS BY MOUTH FOR 7 DAYS, THEN 2 TABLETS FOR 7 DAYS, THEN 1 TABLET EVERY DAY AFTER.    RENVELA 800 mg Tab TAKE 1 TABLET BY MOUTH THREE TIMES DAILY WITH MEALS    tizanidine 4 mg Cap Take 4 mg by mouth 2 (two) times daily as needed. (Patient taking differently: Take 4 mg by mouth daily as needed (for muscle spasms.). )    warfarin (COUMADIN) 5 MG tablet TAKE 1 TABLET(5 MG) BY MOUTH DAILY     No current facility-administered medications on file prior to visit.        REVIEW OF SYSTEMS:  General: negative; ENT: negative; Allergy and Immunology: negative; Hematological and Lymphatic:  Negative; Endocrine: negative; Respiratory: no cough, increased shortness of breath this morning, or wheezing; Cardiovascular: no chest pain or dyspnea on exertion; Gastrointestinal: no abdominal pain/back, change in bowel habits, or bloody stools; Genito-Urinary: no dysuria, trouble voiding, or hematuria; Musculoskeletal: see HPI  Neurological: no TIA or stroke symptoms; Psychiatric: no nervousness, anxiety or depression.    PHYSICAL EXAM:   Vitals:    02/20/19 1314   BP: 103/64   Pulse: 70   Temp: 97.7 °F (36.5 °C)         General appearance:  Alert, well-appearing, and in no distress.  Oriented to person, place, and time   Neurological:  Normal speech, no focal findings noted; CN II - XII grossly intact           Musculoskeletal: Digits/nail without cyanosis/clubbing.  Normal muscle strength/tone.                 Neck: Supple, no significant adenopathy; thyroid is not enlarged                Chest:  Clear to auscultation, symmetric air entry      No use of accessory muscles             Cardiac: Normal rate and regular rhythm, S1 and S2 normal; PMI non-displaced          Abdomen: Soft, non-tender, non-distended, no masses or organomegaly      Extremities:   LUE AVG with palpable thrill, pulsatile, 2+ distal radial pulse;       RLE with hyperpigmentation, induration from distal calf to proximal foot, 2 x 2 cm superficial venous ulceration with mild serous exudate seen on wound care imaging          LAB RESULTS:  Lab Results   Component Value Date    WBC 9.30 01/28/2019    HGB 13.8 01/28/2019    HCT 46.2 01/28/2019    MCV 94 01/28/2019     01/28/2019     BMP  Lab Results   Component Value Date     (L) 01/28/2019    K 4.9 01/28/2019    CL 93 (L) 01/28/2019    CO2 25 01/28/2019    BUN 78 (H) 01/28/2019    CREATININE 9.4 (H) 01/28/2019    CALCIUM 8.4 (L) 01/28/2019    ANIONGAP 17 (H) 01/28/2019    ESTGFRAFRICA 4.8 (A) 01/28/2019    EGFRNONAA 4.2 (A) 01/28/2019     Lab Results   Component Value Date     HGBA1C 6.7 (H) 10/24/2018       IMAGIN2019  Elevated PSV with narrowing in proximal graft with velocity at 543 cm/s. Volume at mid-bicep is 1719 ml/min.      19 VASU - 1.31 R, 1.31 L; PVR waveforms suggest minimal peripheral arterial occlusive disease                                                 Velocities: in cm./sec  Inflow- 282  arterial anastomosis-460  proximal graft-543  mid graft-177  distal graft-158  venous anastomosis-121  venous outflow-  104              volume flow:1719 in ml./min.     Overall Impression:  Color flow duplex exam reveals a patent left upper extremity AV graft. There are elevated PSV's with a visual narrowing noted in the proximal graft measuring 543 cm/s which is suggestive of a hemodynamically signififcant stenosis. Volume flow at mid   bicep level measures 1719 ml./min.    IMP/PLAN:  55 y.o. female with HTN, HLD, CHF (EF 20%), COPD on home O2, paroxysmal Afib (on Coumadin), seizure disorder, ESRD on HD TThS via LUE AVG. Continues to have venous ulceration and stigmata of venous stasis RLE.  Her AVG flow volume has increased to >1700 from ~800 preintervention.  She is dialyzing well.  She states her RLE venous ulceration has returned though she did not wish to take her wrap down.     1) continue coumadin  2) RTC wound care clinic   3) RTC in 8 weeks with AVG duplex    Torrey Villafana MD  Vascular & Endovascular Surgery

## 2019-02-20 NOTE — PROGRESS NOTES
Subjective:       Patient ID:  Sisi Medel is a 58 y.o. female who presents for   Chief Complaint   Patient presents with    Rash     follow-up      Rash  - Follow-up  Symptom course: improving (last seen [facility] 8/31/18)  Currently using: previously used Diprolene. Dove soap. warm showers 1-2 times daily. using goldbond lotion for bodyl.  Affected locations: left lower leg  Signs / symptoms: itching (reports itching significantly improved since started diprolene. Patient reports stopped using ~2 weeks ago. itching is 8/10, occuring few days/weeks)  Severity: mild to moderate    Nail Problem  - Initial  Affected locations: left fingers and right fingers  Duration: 3 months  Signs and Symptoms: reports had acrylic nails on x2 months, reports nails started growing into fake nails causing significant pain & discomfort.  Severity: mild  Timing: constant  Aggravated by: nothing  Treatments tried: went to Dr. Wang's office- had nails removed; completed 30 days of PO Lamisil.        Review of Systems   Constitutional: Negative for fever and chills.   Skin: Positive for rash.   Hematologic/Lymphatic: Adenopathy: coumadin & plavix. Bruises/bleeds easily.        Objective:    Physical Exam   Constitutional: She appears well-developed and well-nourished. She is obese.  She is on home oxygen.  No distress.   Neurological: She is alert and oriented to person, place, and time. She is not disoriented.   Psychiatric: She has a normal mood and affect.   Skin:   Areas Examined (abnormalities noted in diagram):   RLE Inspected  LLE Inspection Performed  Nails and Digits Inspection Performed                  Diagram Legend     Erythematous scaling macule/papule c/w actinic keratosis       Vascular papule c/w angioma      Pigmented verrucoid papule/plaque c/w seborrheic keratosis      Yellow umbilicated papule c/w sebaceous hyperplasia      Irregularly shaped tan macule c/w lentigo     1-2 mm smooth white papules  consistent with Milia      Movable subcutaneous cyst with punctum c/w epidermal inclusion cyst      Subcutaneous movable cyst c/w pilar cyst      Firm pink to brown papule c/w dermatofibroma      Pedunculated fleshy papule(s) c/w skin tag(s)      Evenly pigmented macule c/w junctional nevus     Mildly variegated pigmented, slightly irregular-bordered macule c/w mildly atypical nevus      Flesh colored to evenly pigmented papule c/w intradermal nevus       Pink pearly papule/plaque c/w basal cell carcinoma      Erythematous hyperkeratotic cursted plaque c/w SCC      Surgical scar with no sign of skin cancer recurrence      Open and closed comedones      Inflammatory papules and pustules      Verrucoid papule consistent consistent with wart     Erythematous eczematous patches and plaques     Dystrophic onycholytic nail with subungual debris c/w onychomycosis     Umbilicated papule    Erythematous-base heme-crusted tan verrucoid plaque consistent with inflamed seborrheic keratosis     Erythematous Silvery Scaling Plaque c/w Psoriasis     See annotation      Assessment / Plan:        Kyrle's disease  -     betamethasone dipropionate (DIPROLENE) 0.05 % ointment; AAA bid hand under occlusion  Dispense: 45 g; Refill: 3    Good skin care regimen discussed including limiting to one bath or shower/day, using lukewarm water with mild soap and moisturizing cream to skin 1 - 2x/day. Brochure was provided and reviewed with patient.    Onychomycosis  S/p PO course of Lamisil x1 month ago  -     ciclopirox (PENLAC) 8 % Soln; Apply daily to affected nail. Must remove and restart weekly  Dispense: 1 Bottle; Refill: 5    Discussed takes 6 months or longer for nails to grow out.  Recommend trimming nails. Avoid sharing nail tools, such as clippers and scissors.            Follow-up if symptoms worsen or fail to improve.

## 2019-02-20 NOTE — PATIENT INSTRUCTIONS
INCREASE LYRICA TO 1 TAB (100MG)  THREE TIMES DAILY    CONTINUE KEPPRA 1 TAB TWICE DAILY    YOU WILL BE CONTACTED BY [facility] ABOUT COMPOUND CREAM FOR THE LEGS    CONSIDER SUPPLEMENTATION WITH ALPHA LIPOIC ACID 600-1200MG DAILY (AVAILABLE OVER THE COUNTER) TO HELP WITH FOOT PAIN

## 2019-02-20 NOTE — LETTER
February 20, 2019      Jaden Yoo MD  [address]  [address]  [facility] [address]           [facility]  [address]  [place] [address]  Phone: [phone]  Fax: [phone]          Patient: Sisi Medel   MR Number: [id number]   YOB: 1960   Date of Visit: 2/20/2019       Dear Dr. Jaden Yoo:    Thank you for referring Sisi Medel to me for evaluation. Attached you will find relevant portions of my assessment and plan of care.    If you have questions, please do not hesitate to call me. I look forward to following Sisi Medel along with you.    Sincerely,    Kaylene Morelos, NP    Enclosure  CC:  No Recipients    If you would like to receive this communication electronically, please contact [email or web address] or [phone] to request more information on [facility] access.    For providers and/or their staff who would like to refer a patient to [facility], please contact us through our one-stop-shop provider referral line, [facility], at [phone].    If you feel you have received this communication in error or would no longer like to receive these types of communications, please e-mail [email or web address]

## 2019-02-21 NOTE — PROGRESS NOTES
"Per message from Dr Lester, "She has worsening low extremity weakness and atrophy with recent falls and I am concerned about myopathy from either steroids or sarcoidosis.  She will need an EMG to evaluate but will need to have INR<1.7 to have this done.  She is currently scheduled for next month.  Would you having any objection to our office advising her to stop coumadin 5 days prior to the test then resume immediately after? "    Patient is clear to hold coumadin 5 days prior to the procedure. Once procedure date set we can get an INR 1 week prior to give specific veto-procedure instructions.   "

## 2019-02-21 NOTE — TELEPHONE ENCOUNTER
"----- Message from Emeka Lester MD sent at 2/21/2019 12:53 PM CST -----  She is scheduled for April 24.  Dom, could you call her and notify her not to take coumadin after her dose on April 18 and to go to the lab to have an INR check before coming up here to get the procedure done?  CV    ----- Message -----  From: Chepe Kwok PharmD  Sent: 2/21/2019  11:51 AM  To: MD Dr Tayler Dai,   This will be fine. She may hold her coumadin x 5 days prior. We usually advise patient to restart as soon as "safely" possible, preferably the evening of the procedure if this is ok. Let us know once the procedure is scheduled and we will get an INR the week prior and remind her of specific holding and resumption instructions.   Thanks so much!  Stephanie Kwok PharmD  ----- Message -----  From: Emeka Lester MD  Sent: 2/20/2019  11:49 AM  To: Rand OrdonezD, #    Hi Dr. Kwok,  I saw Mrs. Medel in neurology clinic today.  She has worsening low extremity weakness and atrophy with recent falls and I am concerned about myopathy from either steroids or sarcoidosis.  She will need an EMG to evaluate but will need to have INR<1.7 to have this done.  She is currently scheduled for next month.  Would you having any objection to our office advising her to stop coumadin 5 days prior to the test then resume immediately after?  Thanks,  CV      "

## 2019-03-01 NOTE — TELEPHONE ENCOUNTER
----- Message from Bhraati Chaidez sent at 3/1/2019 10:47 AM CST -----  Pt is wanting a follow up apt with Dr Altamirano.  Will not let me schedule.  Please call pt with and apt

## 2019-03-01 NOTE — PROGRESS NOTES
Subjective:       Patient ID: Sisi Medel is a 58 y.o. female.    Chief Complaint: Follow-up    HPI  57 y/o woman with h/o sarcoidosis, chronic combined CHF (most recent EF 40-45%), PAF, HTN, ESRD on HD MWF, obesity, YOANDY, chronic respiratory failure, OA, DM2 here for follow up / coordination of care.    Has had elevated alkaline phosphatase on labs previously, higher on most recent labs. GGT added, normal so this is more consistent with increase bone turnover rather than cholestasis or hepatic sarcoidosis. Had appt with Endocrine on 11/15/18 but missed this, needs to reschedule    DM2 / h/o borderline DM, on chronic steroids - A1c ranging from 5.5 to 6.7 over past 4 years, then increased to 7.4 on recheck 6/13/18. Improved to 6.7 on most recent check 10/2018  Following with DM educator. Due for Endocrine follow up, missed last appt  Taking tradjenta  Checking BG at home, reports generally 90-120s in AM though has had one low to 59. Was 89 this morning. Sometimes up to 170s after meals.  Following with podiatry (due for follow up)   Also following with wound care for ulcers/wounds on lower legs / feet.    Secondary adrenal insufficiency - saw Endocrine for this, on fludricortisone; per note could taper once off prednisone but this is a long-term chronic medication.     Following with Hand Clinic / Ortho for wounds on hands, neuropathy / pain. Having more pain at base of R palm recently, pain with movement/flexion of fingers.    Has severe onychomycosis of fingernails - saw Dr Wang (at [facility]) for this 1/2019, had several fingernails partially removed. Took PO lamisil for 1 month.    Atrial fibrillation, CHF - afib onset 6/2016, underwent JARAD/DC cardioversion at that time but has been in paroxysmal AF since. Follows with cardiology (Dr Adorno, Dr Chavarria) and with coumadin clinic.     ESRD on HD TTS - follows with dialysis - no longer Dr Tesfaye, she isn't sure of name of new nephrologist.  Follows with  Vascular Surgery here for graft problems, recent procedure on L upper arm, returning for re-evaluation soon.  Low vitamin D - takes supplement    Sarcoidosis, CRLD, chronic hypoxia - follows with Dr Altamirano (previously Dr Harvey) with Pulmonology. On chronic steroids, on home O2    Seizure disorder - on keppra, following with neurology for this as well as for her neuropathy.   Takes lyrica for peripheral neuropathy, voltaren gel   Dose of lyrica increased recently with better relief  Had EMG/NCS in 11/2018; planned for another EMG/NCS soon for her neuropathy  H/o TIA 12/2017 - noted as TIA induced by hypotension, no abnormalities on CTA at that time.    Review of Systems   Constitutional: Positive for fatigue (chronic). Negative for activity change, appetite change and fever.   HENT: Negative.    Eyes: Negative for visual disturbance.   Respiratory: Positive for shortness of breath (baseline, as noted). Negative for cough and wheezing.    Cardiovascular: Negative for chest pain, palpitations and leg swelling.   Gastrointestinal: Negative for abdominal pain, constipation, diarrhea and nausea.   Endocrine: Negative for polydipsia and polyuria.   Genitourinary: Positive for decreased urine volume (little to no urination (on HD, chronic)).   Musculoskeletal: Positive for arthralgias (improved), gait problem (uses walker at baseline), joint swelling (as noted) and myalgias (muscle aches, foot pain, foot cramps, neck cramps since starting HD; chronic).   Skin: Negative for rash.        As noted; wounds on hands have healed   Neurological: Positive for numbness (paresthesias / burning, both feet, also hands - chronic). Negative for dizziness, seizures, speech difficulty, weakness (generalized, intermittent; not worse currently) and light-headedness.   Psychiatric/Behavioral: Negative for dysphoric mood (depressed mood related to health issues at times but currently doing well ).         Past medical history, surgical history,  "and family medical history reviewed and updated as appropriate.    Medications and allergies reviewed.     Objective:          Vitals:    03/01/19 0845   BP: 96/60   BP Location: Right arm   Patient Position: Sitting   Pulse: 70   Temp: 97.7 °F (36.5 °C)   TempSrc: Oral   SpO2: 97%   Weight: 116.9 kg (257 lb 11.5 oz)   Height: 5' 8" (1.727 m)     Body mass index is 39.19 kg/m².  Physical Exam   Constitutional: She is oriented to person, place, and time. She appears well-developed and well-nourished. No distress.   Sitting comfortably, speaking in full sentences. Wearing portable O2.    HENT:   Head: Normocephalic and atraumatic.   Mouth/Throat: Oropharynx is clear and moist.   Eyes: EOM are normal. Pupils are equal, round, and reactive to light. No scleral icterus.   Neck: Neck supple. No JVD present.   Cardiovascular: Normal rate and normal heart sounds. An irregularly irregular rhythm present.   No murmur heard.  Pulmonary/Chest: Effort normal and breath sounds normal. No respiratory distress.   Abdominal: Soft. Bowel sounds are normal. She exhibits no distension. There is no tenderness.   Musculoskeletal: She exhibits no edema or tenderness.   No joint pain or erythema   Lymphadenopathy:     She has no cervical adenopathy.   Neurological: She is alert and oriented to person, place, and time. She has normal strength. No cranial nerve deficit or sensory deficit. Gait normal.   Skin: Skin is warm and dry. She is not diaphoretic.   Healed wound bilateral hand web space   Psychiatric: She has a normal mood and affect.   Vitals reviewed.      Lab Results   Component Value Date    WBC 9.30 01/28/2019    HGB 13.8 01/28/2019    HCT 46.2 01/28/2019     01/28/2019    CHOL 169 12/08/2017    TRIG 98 12/08/2017    HDL 58 12/08/2017    ALT 22 01/28/2019    AST 23 01/28/2019     (L) 01/28/2019    K 4.9 01/28/2019    CL 93 (L) 01/28/2019    CREATININE 9.4 (H) 01/28/2019    BUN 78 (H) 01/28/2019    CO2 25 01/28/2019 "    TSH 0.628 11/12/2018    INR 1.5 02/20/2019    HGBA1C 6.7 (H) 10/24/2018       Assessment:       1. Elevated alkaline phosphatase level    2. Controlled type 2 diabetes mellitus without complication, without long-term current use of insulin    3. Secondary adrenal insufficiency    4. Vitamin D insufficiency    5. Pulmonary sarcoidosis    6. Immunosuppressed status    7. Current chronic use of systemic steroids    8. Onychomycosis    9. Seizure disorder    10. Polyneuropathy associated with underlying disease    11. CRLD (chronic restrictive lung disease)    12. Chronic respiratory failure with hypoxia    13. Stenosis of arteriovenous graft, subsequent encounter    14. Severe obesity (BMI 35.0-35.9 with comorbidity)    15. Permanent atrial fibrillation        Plan:   Sisi was seen today for follow-up.    Diagnoses and all orders for this visit:    Elevated alkaline phosphatase level - reviewed results with patient, discussed concerns, reminded to reschedule with Endocrine as soon as possible    Controlled type 2 diabetes mellitus with complication, without long-term current use of insulin - on tradjenta with improvement. Due to chronic steroid use; unlikely to be able to stop steroids.    Secondary adrenal insufficiency - reviewed, emphasized importance of following with Endocrine    Vitamin D insufficiency - monitored/managed by her nephrologist currently; recommended also check in with Endocrine    Pulmonary sarcoidosis   CRLD (chronic restrictive lung disease)  Chronic respiratory failure with hypoxia- reviewed; following with pulmonology. Requiring chronic steroids,    Immunosuppressed status - ESRD, chronic steroids.     Current chronic use of systemic steroids - as noted    Onychomycosis - continue to follow with dermatologist    Seizure disorder - reviewed neurology notes, continue current medications    Polyneuropathy associated with underlying disease - doing better on lyrica. Follow with neurology, no  changes to meds today    Hand pain - recommended f/u with Ortho (Dr Yoo)    Health maintenance reviewed with patient.   Schedule appt with Endocrine, Ortho / hand clinic, and Pulmonology at checkout  Follow-up in about 4 months (around 7/1/2019) for follow up.    Carlos A Caputo MD  Internal Medicine  [facility]  3/1/2019    45 minutes spent with patient with >50% of visit spent counseling patient regarding conditions documented above and planning for care coordination. All questions answered.

## 2019-03-01 NOTE — PROGRESS NOTES
Patient advised to take 10mg 3/1 then maintain 5mg daily   Advised follow up 3/6/19   Patient verbalized understanding  Patient called back to reschedule 3/6 lab to 3/8/19

## 2019-03-01 NOTE — TELEPHONE ENCOUNTER
I called to speak with Ms. Medel a voicemail was left at 11:34am for patient advising her that I was returning her call from Dr. Altamirano office & she can give me a call back at the office once she receives the message.

## 2019-03-01 NOTE — PROGRESS NOTES
Roddy with lab called to report that blood sample from today was slightly hemolyzed. Based on presentation and last INR will proceed with same dosing plan - suspect INR low due to removal of DDI.

## 2019-03-01 NOTE — PATIENT INSTRUCTIONS
For diabetes, increased bone turnover due to kidney disease (and possibly also due to sarcoidosis), and adrenal insufficiency - schedule follow up with Endocrinology.  Please also check in with the endocrinologist about your vitamin D.     For your hand pain, schedule your follow up with Dr Jaden Yoo in the Ortho hand clinic.    Schedule your follow up with Dr Wang for dermatology / fingernails, if this is not already scheduled.    Check with your pharmacy about getting new refill on your plavix (clopidogrel)   Do check in with your neurologist about whether you need to avoid taking this before you have a procedure.

## 2019-03-01 NOTE — Clinical Note
Annette - this patient is scheduled to see you, but apparently wasn't able to get an appt until July; she missed her appt in November.She has a persistent elevated alk phos with normal GGT, and has sarcoidosis, secondary adrenal insufficiency due to chronic steroids, and ESRD. Could you review her note and see if there are further tests you would recommend that I do in terms of workup for the elevated alk phos, as it'll be several months before her appt with you?Thank you!Carlos A Caputo MD

## 2019-03-06 PROBLEM — E66.01 SEVERE OBESITY (BMI 35.0-35.9 WITH COMORBIDITY): Status: ACTIVE | Noted: 2019-01-01

## 2019-03-08 PROBLEM — L73.9 FOLLICULITIS: Status: ACTIVE | Noted: 2019-01-01

## 2019-03-08 NOTE — PATIENT INSTRUCTIONS
Wash leg with a mild soap such as dove and dry thoroughly.  Apply medihoney gel daily to right lateral leg ulcer.  Apply bactroban twice daily to pustules right lower leg, cover with gauze and secure with roll gauze.

## 2019-03-08 NOTE — PROGRESS NOTES
Subjective:       Patient ID: Sisi Medel is a 58 y.o. female.    Chief Complaint: Rash    Patient is a 55 y.o.  female with a h/o HTN, HLD, CHF (EF 20%), COPD on home O2, paroxysmal Afib (on Coumadin), seizure disorder, ESRD on HD. The patient reports injuring her right lower leg approximately two months ago. She has used various ointments with no success. The patient presented to the ED on 12/30/2018 for discoloration and pain to the area. She is afebrile. She denies increased redness, swelling or purulent drainage.  She is using bactroban twice daily on the pustules and open wound. She has delayed healing. She is not complaining of pain.      Review of Systems   Constitutional: Positive for fatigue. Negative for chills, diaphoresis and fever.   HENT: Negative for ear pain, nosebleeds, sinus pain, sore throat and trouble swallowing.    Eyes: Negative for pain and redness.   Respiratory: Negative for cough, shortness of breath and wheezing.    Cardiovascular: Positive for leg swelling. Negative for chest pain and palpitations.   Gastrointestinal: Negative for abdominal distention, abdominal pain, blood in stool, nausea and vomiting.   Endocrine: Negative for polydipsia, polyphagia and polyuria.   Genitourinary: Negative for flank pain and hematuria.   Musculoskeletal: Positive for arthralgias. Negative for back pain, joint swelling and myalgias.   Skin: Positive for rash and wound (Right lower leg).   Neurological: Positive for weakness. Negative for seizures, facial asymmetry and headaches.   Hematological: Negative for adenopathy. Bruises/bleeds easily.   Psychiatric/Behavioral: Negative for agitation, dysphoric mood and sleep disturbance. The patient is not nervous/anxious.        Objective:      Physical Exam   Constitutional: She is oriented to person, place, and time. Vital signs are normal. No distress.   Obese female   HENT:   Head: Normocephalic.   Eyes: Lids are normal.   Neck:  Trachea normal. Carotid bruit is not present.   Cardiovascular: Intact distal pulses.   Pulses:       Dorsalis pedis pulses are 2+ on the right side, and 2+ on the left side.   Musculoskeletal: Normal range of motion.        Right lower leg: She exhibits edema.        Legs:  Neurological: She is alert and oriented to person, place, and time.   Skin: Skin is warm and dry. She is not diaphoretic. No erythema.   Psychiatric: She has a normal mood and affect. Thought content normal.   Vitals reviewed.      Lower Extremity US 12/30/2018: Ankle-brachial index of 1.28 on the right and 1.17 on the left.  No hemodynamically significant stenosis demonstrated in the right or left lower extremity arterial system. Bilateral calf arteries demonstrate monophasic waveforms which may suggest distal vascular disease.  Assessment:       1. Ulcer of right lower extremity with fat layer exposed    2. Folliculitis        Plan:       Specimen collected and sent to lab for gram stain and culture with sensitivities also.  Apply medihoney gel daily to right lateral leg ulcer.  Apply bactroban twice daily to pustules right lower leg, cover with gauze and secure with roll gauze.  Return to clinic in 3-4 weeks.

## 2019-03-10 PROBLEM — J18.9 PNEUMONIA OF LEFT LOWER LOBE DUE TO INFECTIOUS ORGANISM: Status: ACTIVE | Noted: 2019-01-01

## 2019-03-10 NOTE — ED PROVIDER NOTES
24Encounter Date: 3/10/2019    SCRIBE #1 NOTE: I, Jae Jackson, am scribing for, and in the presence of,  Dr. Triplett. I have scribed the following portions of the note - Other sections scribed: HPI, ROS, PE.       History     Chief Complaint   Patient presents with    Hypotension     59 yo F with PMHx NIDDM, ESRD on hemodialysis, pulmonary sarcoidosis, seizure disorder, hyperlipidemia, CHF presents with chief complaint of hypotension.  Patient reports intermittent hypotension x2 weeks with associated dizziness, fatigue and wheezing.  Patient is on prednisone (long term steroid use), coumadin, midodrine but does not currently take any medications for pulmonary hypertension.  She reports she was incompletely dialyzed yesterday (her dialysis schedule is Tuesday, Thursday, Saturday) due to experiencing dizziness.  Patient further endorses chest pain and abdominal pain.  She denies N/V/D, fever, and dysuria.  Of note, patient states she does not urinate at all.  She reports taking 5 mg 3x a day of midodrine to little effect.  A ten point review of systems was completed and is negative except as documented above. Patient denies any other acute medical complaint. The patient's available PMH, PSH, medications, allergies, and triage vital signs were reviewed just prior to their medical evaluation.      The history is provided by the patient and medical records.     Review of patient's allergies indicates:   Allergen Reactions    Bactrim [sulfamethoxazole-trimethoprim] Other (See Comments)     Causes renal failure    Nsaids (non-steroidal anti-inflammatory drug) Other (See Comments)     Renal failure     Past Medical History:   Diagnosis Date    Anemia in ESRD (end-stage renal disease) 3/7/2016    Anticoagulant long-term use     Cervical radiculopathy 7/20/2015    CHF (congestive heart failure)     Chronic combined systolic and diastolic heart failure 6/14/2013    EF 20% 2013, improved with Medical therapy, negative  PET 2013    Chronic respiratory failure with hypoxia 2013    On home oxygen at 2-5 liters    Closed fracture of proximal end of right fibula 2016    Complications due to renal dialysis device, implant, and graft     DDD (degenerative disc disease), lumbar 2015    Dependence on renal dialysis     Diabetes mellitus type II, controlled 2017    ESRD on hemodialysis 2016    Essential hypertension     Gout     Lateral meniscal tear 2016    Lumbar stenosis 2015    Obesity, Class III, BMI 40-49.9 (morbid obesity)     Pacemaker     Paroxysmal atrial fibrillation 2014    Not on anticoagulation    Peripheral neuropathy 2013    Personal history of gastric ulcer 3/19/2013    Sarcoidosis, lung 2009    Diagnosed in  with ocular manifestation, on 4L home O2 and PO steroids    Secondary pulmonary hypertension 2015    Seizure disorder 3/19/2013    Shingles 2013    Thyroid disease      Past Surgical History:   Procedure Laterality Date    ABDOMINAL SURGERY      ABLATION N/A 2017    Performed by [facility]imir [facility]orno [facility] at [facility] CATH LAB    ANGIOPLASTY Left 1/10/2018    Performed by Torrey Villafana MD at [facility] OR 2ND FLR    ANGIOPLASTY-PERCUTANEOUS TRANSLUMINAL (PTA) Left 2017    Performed by Tor[facility] at [facility] OR 2ND FLR    ANGIOPLASTY-PERCUTANEOUS TRANSLUMINAL (PTA) Left 10/12/2016    Performed by Torrey [facility]ana [facility] at [facility] OR 2ND FLR    CARDIAC PACEMAKER PLACEMENT       SECTION      x2    DECLOT GRAFT PERCUTANEOUS Left 2017    Performed by [facility]ana MD at [facility] OR 2ND FLR    DECLOT-GRAFT Left 2016    Performed by Harjit Starr MD at [facility] CATH LAB    DECLOT-GRAFT Left 2016    Performed by [facility]jit Starr MD at [facility] CATH LAB    ECHOCARDIOGRAM-TRANSESOPHAGEAL N/A 2013    Performed by Delmy Surgeon at [facility] DELMY    fistulogram Left 1/10/2018    Performed by [facility] MD  at [facility] OR 2ND FLR    FISTULOGRAM Left 2017    Performed by Tor[facility]ana MD at [facility] CATH LAB    FISTULOGRAM Left 10/12/2016    Performed by Torrey [facility]afana MD at [facility] OR 2ND FLR    FISTULOGRAM Left 2016    Performed by Harjit Starr MD at [facility] CATH LAB    Fistulogram, LUE AVG, possible intervention Left 2019    Performed by Tor[facility]ana MD at [facility] CATH LAB    INJECTION-STEROID-EPIDURAL-TRANSFORAMINAL Right 2015    Performed by [facility] at [facility]    INJECTION-STEROID-EPIDURAL-TRANSFORAMINAL Right 2015    Performed by [facility] at [facility]    CQIMFXWYG-TMTHZ-[id number] / Left upper AV graft. Left 2/3/2016    Performed by [facility]ana MD at [facility] OR 2ND FLR    LUUKPOMHR-LRMVRCFUE-NPHQORNQDDOKZ N/A 2017    Performed by Bladimir Adorno MD at [facility] CATH LAB    OTHER SURGICAL HISTORY      loop recorder implant    PLACEMENT-STENT Left 2017    Performed by [facility] MD at [facility] OR 2ND FLR    PTA, AV FISTULA N/A 2019    Performed by Torrey [facility]ana MD at [facility] CATH LAB    stent to fistula      TRANSESOPHAGEAL ECHOCARDIOGRAM (JARAD) N/A 2016    Performed by Sourav Machuca MD at [facility] CATH LAB    ULTRASOUND, UPPER GI TRACT, ENDOSCOPIC N/A 2014    Performed by Stewart Burgess MD at [facility] ENDO (2ND FLR)    VASCULAR SURGERY      fistula to L upper arm      Family History   Problem Relation Age of Onset    Kidney failure Mother     Hypertension Mother     Diabetes Mother     Coronary artery disease Father     Hypertension Father     Diabetes Father     Lupus Sister     Diabetes Maternal Grandmother     Colon cancer Neg Hx     Ovarian cancer Neg Hx     Breast cancer Neg Hx      Social History     Tobacco Use    Smoking status: Former Smoker     Packs/day: 0.50     Years: 10.00     Pack years: 5.00     Types: Cigarettes     Last attempt to quit: 1994     Years since quittin.8     Smokeless tobacco: Never Used   Substance Use Topics    Alcohol use: No     Alcohol/week: 0.0 oz     Comment: rarely    Drug use: No     Review of Systems   Constitutional: Positive for fatigue. Negative for fever.   HENT: Negative for sore throat.    Eyes: Negative for visual disturbance.   Respiratory: Positive for cough and shortness of breath. Negative for wheezing.    Cardiovascular: Positive for chest pain.   Gastrointestinal: Positive for abdominal pain. Negative for diarrhea, nausea and vomiting.   Genitourinary: Positive for difficulty urinating. Negative for dysuria.   Musculoskeletal: Negative for neck pain.   Skin: Negative for rash.   Allergic/Immunologic: Positive for immunocompromised state.   Neurological: Positive for dizziness. Negative for syncope.   All other systems reviewed and are negative.      Physical Exam     Initial Vitals [03/10/19 1036]   BP Pulse Resp Temp SpO2   92/66 76 18 97 °F (36.1 °C) 95 %      MAP       --         Physical Exam    Nursing note and vitals reviewed.  Constitutional: She appears well-developed and well-nourished. She is not diaphoretic. No distress.   HENT:   Head: Normocephalic and atraumatic.   Nose: Nose normal.   Eyes: Conjunctivae are normal. Right eye exhibits no discharge. Left eye exhibits no discharge.   Neck: Normal range of motion. Neck supple.   nontender   Cardiovascular: Normal rate, regular rhythm and normal heart sounds. Exam reveals no gallop and no friction rub.    No murmur heard.  Pulmonary/Chest: Breath sounds normal. No respiratory distress. She has no wheezes. She has no rhonchi. She has no rales.   Abdominal: Soft. There is tenderness in the suprapubic area. There is no rebound and no guarding.   superpubic ttp   Musculoskeletal: Normal range of motion. She exhibits no edema or tenderness.   Neurological: She is alert and oriented to person, place, and time. GCS score is 15. GCS eye subscore is 4. GCS verbal subscore is 5. GCS motor  subscore is 6.   Skin: Skin is warm and dry. Abrasion (chronic wound on right lower extremity) noted. No rash noted. No erythema.   Psychiatric: She has a normal mood and affect. Her behavior is normal. Judgment and thought content normal.         ED Course   Procedures  Labs Reviewed   APTT - Abnormal; Notable for the following components:       Result Value    aPTT 37.7 (*)     All other components within normal limits   B-TYPE NATRIURETIC PEPTIDE - Abnormal; Notable for the following components:     (*)     All other components within normal limits   CBC W/ AUTO DIFFERENTIAL - Abnormal; Notable for the following components:    MCHC 29.7 (*)     RDW 17.3 (*)     MPV 13.1 (*)     Immature Granulocytes 0.8 (*)     Immature Grans (Abs) 0.07 (*)     Lymph # 0.7 (*)     Gran% 82.5 (*)     Lymph% 7.8 (*)     All other components within normal limits   COMPREHENSIVE METABOLIC PANEL - Abnormal; Notable for the following components:    Glucose 142 (*)     BUN, Bld 38 (*)     Creatinine 6.3 (*)     Calcium 7.8 (*)     Albumin 3.0 (*)     Anion Gap 17 (*)     eGFR if  7.8 (*)     eGFR if non  6.8 (*)     All other components within normal limits   LACTIC ACID, PLASMA - Abnormal; Notable for the following components:    Lactate (Lactic Acid) 2.7 (*)     All other components within normal limits   PROTIME-INR - Abnormal; Notable for the following components:    Prothrombin Time 24.1 (*)     INR 2.5 (*)     All other components within normal limits   TROPONIN I - Abnormal; Notable for the following components:    Troponin I 0.173 (*)     All other components within normal limits   HEMOGLOBIN A1C - Abnormal; Notable for the following components:    Hemoglobin A1C 6.2 (*)     All other components within normal limits   TROPONIN I - Abnormal; Notable for the following components:    Troponin I 0.137 (*)     All other components within normal limits   INFLUENZA A & B BY MOLECULAR   CULTURE,  RESPIRATORY   CULTURE, BLOOD   CULTURE, BLOOD   TSH   LACTIC ACID, PLASMA   URINALYSIS, REFLEX TO URINE CULTURE   POCT GLUCOSE MONITORING CONTINUOUS     EKG Readings: (Independently Interpreted)   Heart Rate: 71.   paced       Imaging Results          CT Abdomen Pelvis With Contrast (Final result)  Result time 03/10/19 15:12:34    Final result by Pernell Goss MD (03/10/19 15:12:34)                 Impression:      Marked cardiomegaly with bibasilar consolidation, possibly infection or aspiration, slightly more confluent in the left lower lobe.    Diffuse bladder wall thickening likely due to incomplete distension.  Correlate with urinalysis to exclude cystitis.    Aortic ectasia and dense aortoiliac atherosclerotic calcification with probable hemodynamically significant stenosis within the right external iliac artery and right proximal renal artery.    Additional stable findings as detailed above.    Electronically signed by resident: Alexi Weldon  Date:    03/10/2019  Time:    13:57    Electronically signed by: Pernell Goss MD  Date:    03/10/2019  Time:    15:12             Narrative:    EXAMINATION:  CT ABDOMEN PELVIS WITH CONTRAST    CLINICAL HISTORY:  Abdominal pain, unspecified;    TECHNIQUE:  Low-dose, axial CT images of the abdomen and pelvis were obtained after the administration of 100 cc of Omnipaque 350 intravenous contrast material. No oral contrast was administered.  Coronal and sagittal reformations were provided for review.    COMPARISON:  CT abdomen 11/27/2016    FINDINGS:  Heart: Heart is enlarged.  There is no pericardial effusion.  Pacing wires are visualized in the right and left heart.    Lung Bases: There is patchy central ground-glass density and slightly more confluent consolidative opacity in the left lower lobe with air bronchograms.  No pleural effusion, pleural thickening, or pneumothorax.    Liver: Normal in size and attenuation without focal hepatic abnormality. Portal vein  and hepatic veins are patent.    Gallbladder: Normal appearance without evidence for cholecystitis.    Bile Ducts: No intrahepatic biliary ductal dilatation.  The extrahepatic supra pancreatic common bile duct is mildly prominent measuring 6 mm and tapers normally to the level of the ampulla.    Pancreas: No pancreatic mass lesion or peripancreatic inflammatory change.    Spleen: Normal size without focal abnormality.  There is a small accessory spleen.    Adrenals: Normal.    Genitourinary: Both kidneys are atrophic with marked cortical thinning and there is no contrast excretion on delayed images compatible with chronic medical renal disease.  There are multiple small round hypoattenuating foci which are too small to characterize.  Largest renal hypodensity measures 1.7 cm with slightly greater than simple fluid attenuation suggesting a mildly complex cyst as described on prior ultrasound 12/08/2017.  Bladder walls are diffusely thickened likely due to incomplete distension.  There is atherosclerotic calcification at the proximal right renal artery likely resulting in hemodynamically significant stenosis.    Reproductive organs: Normal size with focal dystrophic calcification at the uterine fundus, likely a degenerating fibroid.    GI tract/Mesentery: Stomach and distal esophagus are normal.  High-density material scattered throughout the colon and rectum, most likely related to recent oral contrast administration. Colonic diverticulosis.  Visualized loops of small and large bowel are normal in caliber without evidence for inflammation or obstruction.  Normal appendix.    Peritoneal Space: No abdominopelvic ascites or intraperitoneal free air.    Retroperitoneum: No significant adenopathy.    Abdominal wall: Normal.    Vasculature: Aortic ectasia and dense aortoiliac atherosclerotic calcification with probable hemodynamically significant stenosis within the right external iliac artery and right proximal renal  artery.    Bones: Normal appearance without acute fracture or bony destructive process.                               X-Ray Chest PA And Lateral (Final result)  Result time 03/10/19 13:12:08    Final result by Pernell Goss MD (03/10/19 13:12:08)                 Impression:      #1. As above.      Electronically signed by: Pernell Goss MD  Date:    03/10/2019  Time:    13:12             Narrative:    EXAMINATION:  XR CHEST PA AND LATERAL    CLINICAL HISTORY:  Shortness of breath    TECHNIQUE:  PA and lateral views of the chest were performed.    COMPARISON:  Chest PA and lateral 12/30/2018    FINDINGS:  There are bilateral perihilar reticular lung opacities that may represent edema.  There is nonspecific enlargement of the cardiac silhouette, mediastinum, and pulmonary vasculature.  There is a stable right-sided transvenous pacemaker.  No pneumothorax.    The osseous structures are unremarkable.                              X-Rays:   Independently Interpreted Readings:   Other Readings:  Reviewed CXR images    Medical Decision Making:   History:   Old Medical Records: I decided to obtain old medical records.  Old Records Summarized: records from clinic visits.  Clinical Tests:   Lab Tests: Ordered and Reviewed  Radiological Study: Ordered and Reviewed  Medical Tests: Ordered and Reviewed  ED Management:  57 yo F with dyspnea and hypotension.  Vitals confirm.  PE as above.  Concern for CHF exacerbation.  Labs unremarkable.  CT shows possible PNA with bladder wall thickening.  Patient does not make urine.  Attempted cath.  Treated with levofloxacin.  Given small fluid bolus.  Will admit for echo and eval for hypotension.  Did bedside teaching.  All questions answered.  Patient acknowledges understanding.  Other:   I have discussed this case with another health care provider.       <> Summary of the Discussion: TALIA Moss Attestation:   Scribe #1: I performed the above scribed service and the  documentation accurately describes the services I performed. I attest to the accuracy of the note.               Clinical Impression:       ICD-10-CM ICD-9-CM   1. Pneumonia of left lower lobe due to infectious organism J18.1 486   2. Hypotension I95.9 458.9   3. Shortness of breath R06.02 786.05   4. Lower abdominal pain R10.30 789.09   5. History of sarcoidosis Z86.2 V12.29   6. Immunocompromised due to corticosteroids D84.8 279.3    T38.0X5A E980.4   7. Chest pain R07.9 786.50   8. CHF (congestive heart failure) I50.9 428.0         Disposition:   Disposition: Admitted  Condition: Stable                        Edi Triplett MD  03/10/19 [id number]

## 2019-03-10 NOTE — ED TRIAGE NOTES
C/o hypotension after her dialysis treatments x 2 weeks, dialysis t/t/s, dialysis done yesterday, reports headaches and dizziness at present

## 2019-03-10 NOTE — PROVIDER PROGRESS NOTES - EMERGENCY DEPT.
Encounter Date: 3/10/2019    ED Physician Progress Notes        Physician Note:   Paced rhythm    EKG - STEMI Decision  Initial Reading: No STEMI present.

## 2019-03-10 NOTE — H&P
Hospital Medicine  History and Physical Exam    Team: [facility] O Faye Crespo MD  Admit Date: 3/10/2019  Principal Problem:  Pneumonia of left lower lobe due to infectious organism   Patient information was obtained from patient and ER records.   Primary care Physician: Carlos A Caputo MD  Code status: Full Code    HPI:   Sisi Medel is a 58 y.o. female with hx of NIDDM, ESRD on hemodialysis, pulmonary sarcoidosis, seizure disorder, PAF, hyperlipidemia, chronic combined systolic and diastolic heart failure, pulmonary hypertension and severe obesity who presents with shortness of breath for the last 2 weeks.  Patient notes hypotension for the last 2 weeks associated with dialysis.  She does have a history of chronic hypotension for which she takes midodrine but notes this is worse than her normal.  Patient also notes worsening shortness of breath especially on exertion.  She chronically wears oxygen about 3-5 L as needed for her pulmonary hypertension.  Patient notes becoming more short of breath as well as a dry cough.  She also notes some chills.  She denies any fevers.  She denies any sick contacts although she is a dialysis patient.  Patient denies any vomiting but has had some nausea.  She also notes some lower abdominal pain and constipation.  Patient denies any diarrhea.  She does not make any urine.  Patient notes dizziness, lightheadedness and near-syncope associated with her low blood pressure.  She notes she almost fainted yesterday.  Patient notes they were unable to complete dialysis yesterday due to dizziness and hypotension.    In the ED, patient was found to have a left lower lobe pneumonia on CT scan.  Her lactic acid was also mildly elevated at 2.7.  Her BNP was elevated at 705.  Her troponin was also elevated at 0.173.  Patient was given a dose of Levaquin and admission was requested for pneumonia.      Hemoglobin A1C   Date Value Ref Range Status   10/24/2018 6.7 (H) 4.0 - 5.6 %  Final     Comment:     ADA Screening Guidelines:  5.7-6.4%  Consistent with prediabetes  >or=6.5%  Consistent with diabetes  High levels of fetal hemoglobin interfere with the HbA1C  assay. Heterozygous hemoglobin variants (HbS, HgC, etc)do  not significantly interfere with this assay.   However, presence of multiple variants may affect accuracy.     06/13/2018 7.4 (H) 4.0 - 5.6 % Final     Comment:     ADA Screening Guidelines:  5.7-6.4%  Consistent with prediabetes  >or=6.5%  Consistent with diabetes  High levels of fetal hemoglobin interfere with the HbA1C  assay. Heterozygous hemoglobin variants (HbS, HgC, etc)do  not significantly interfere with this assay.   However, presence of multiple variants may affect accuracy.     01/02/2018 6.6 (H) 4.0 - 5.6 % Final     Comment:     According to ADA guidelines, hemoglobin A1c <7.0% represents  optimal control in non-pregnant diabetic patients. Different  metrics may apply to specific patient populations.   Standards of Medical Care in Diabetes-2016.  For the purpose of screening for the presence of diabetes:  <5.7%     Consistent with the absence of diabetes  5.7-6.4%  Consistent with increasing risk for diabetes   (prediabetes)  >or=6.5%  Consistent with diabetes  Currently, no consensus exists for use of hemoglobin A1c  for diagnosis of diabetes for children.  This Hemoglobin A1c assay has significant interference with fetal   hemoglobin   (HbF). The results are invalid for patients with abnormal amounts of   HbF,   including those with known Hereditary Persistence   of Fetal Hemoglobin. Heterozygous hemoglobin variants (HbAS, HbAC,   HbAD, HbAE, HbA2) do not significantly interfere with this assay;   however, presence of multiple variants in a sample may impact the %   interference.         Past Medical History: Patient has a past medical history of Anemia in ESRD (end-stage renal disease) (3/7/2016), Anticoagulant long-term use, Cervical radiculopathy (7/20/2015),  CHF (congestive heart failure), Chronic combined systolic and diastolic heart failure (2013), Chronic respiratory failure with hypoxia (2013), Closed fracture of proximal end of right fibula (2016), Complications due to renal dialysis device, implant, and graft, DDD (degenerative disc disease), lumbar (2015), Dependence on renal dialysis, Diabetes mellitus type II, controlled (2017), ESRD on hemodialysis (2016), Essential hypertension, Gout, Lateral meniscal tear (2016), Lumbar stenosis (2015), Obesity, Class III, BMI 40-49.9 (morbid obesity), Pacemaker, Paroxysmal atrial fibrillation (2014), Peripheral neuropathy (2013), Personal history of gastric ulcer (3/19/2013), Sarcoidosis, lung (), Secondary pulmonary hypertension (2015), Seizure disorder (3/19/2013), Shingles (2013), and Thyroid disease.    Past Surgical History: Patient has a past surgical history that includes Abdominal surgery;  section; OTHER SURGICAL HISTORY; Vascular surgery; stent to fistula; Cardiac pacemaker placement; Fistulogram (Left, 2019); and Percutaneous transluminal angioplasty of arteriovenous fistula (N/A, 2019).    Social History: Patient reports that she quit smoking about 24 years ago. Her smoking use included cigarettes. She has a 5.00 pack-year smoking history. she has never used smokeless tobacco. She reports that she does not drink alcohol or use drugs.    Family History: family history includes Coronary artery disease in her father; Diabetes in her father, maternal grandmother, and mother; Hypertension in her father and mother; Kidney failure in her mother; Lupus in her sister.    Medications: reviewed     Allergies: Patient is allergic to bactrim [sulfamethoxazole-trimethoprim] and nsaids (non-steroidal anti-inflammatory drug).    ROS  Constitutional: Negative for  fever. +chills, fatigue  HENT: Negative for sore throat, trouble swallowing.     Eyes: Negative for photophobia, visual disturbance.   Respiratory: +cough, shortness of breath.    Cardiovascular: Negative for chest pain, palpitations, leg swelling.   Gastrointestinal: Negative for diarrhea, nausea, vomiting. +constipation, abdominal pain   Endocrine: Negative for cold intolerance, heat intolerance.   Genitourinary: Negative for dysuria, frequency. +anuric  Musculoskeletal: Negative for arthralgias, myalgias.   Skin: Negative for rash, erythema , +RLE wound  Neurological: Negative for syncope, +dizziness, weakness, light-headedness, near syncope.   Psychiatric/Behavioral: Negative for confusion, hallucinations, anxiety  All other systems reviewed and are negative.    PEx  Temp:  [97 °F (36.1 °C)]   Pulse:  [68-76]   Resp:  [16-21]   BP: ()/(54-71)   SpO2:  [95 %-100 %]   Body mass index is 40.02 kg/m².     Intake/Output Summary (Last 24 hours) at 3/10/2019 1741  Last data filed at 3/10/2019 1500  Gross per 24 hour   Intake 100 ml   Output --   Net 100 ml       General appearance: no distress, alert and oriented x 3  HEENT:  conjunctivae/corneas clear, PERRL, mucous membranes dry   Neck: supple, thyroid not enlarged  Pulm:   normal respiratory effort, decreased breath sounds in bases with rhonchi noted in b/l bases, no wheezes, rales, +O2 via NC  Card: RRR, S1, S2 normal, no murmur, click, rub or gallop, +pacer in chest  Abd: soft, NT, ND, BS present; no masses, no organomegaly, obese  Ext: mild pedal edema b/l, RLE with dressing in place c/d/i  Pulses: 2+, symmetric  Skin: color, texture, turgor normal. No rashes or lesions  Neuro: CN II-XII grossly intact, no focal numbness or weakness, normal strength and tone   Psych: normal mood and affect    Recent Results (from the past 24 hour(s))   APTT    Collection Time: 03/10/19 11:30 AM   Result Value Ref Range    aPTT 37.7 (H) 21.0 - 32.0 sec   Brain natriuretic peptide    Collection Time: 03/10/19 11:30 AM   Result Value Ref Range      (H) 0 - 99 pg/mL   CBC auto differential    Collection Time: 03/10/19 11:30 AM   Result Value Ref Range    WBC 9.08 3.90 - 12.70 K/uL    RBC 4.93 4.00 - 5.40 M/uL    Hemoglobin 13.7 12.0 - 16.0 g/dL    Hematocrit 46.1 37.0 - 48.5 %    MCV 94 82 - 98 fL    MCH 27.8 27.0 - 31.0 pg    MCHC 29.7 (L) 32.0 - 36.0 g/dL    RDW 17.3 (H) 11.5 - 14.5 %    Platelets 173 150 - 350 K/uL    MPV 13.1 (H) 9.2 - 12.9 fL    Immature Granulocytes 0.8 (H) 0.0 - 0.5 %    Gran # (ANC) 7.5 1.8 - 7.7 K/uL    Immature Grans (Abs) 0.07 (H) 0.00 - 0.04 K/uL    Lymph # 0.7 (L) 1.0 - 4.8 K/uL    Mono # 0.7 0.3 - 1.0 K/uL    Eos # 0.1 0.0 - 0.5 K/uL    Baso # 0.03 0.00 - 0.20 K/uL    nRBC 0 0 /100 WBC    Gran% 82.5 (H) 38.0 - 73.0 %    Lymph% 7.8 (L) 18.0 - 48.0 %    Mono% 7.7 4.0 - 15.0 %    Eosinophil% 0.9 0.0 - 8.0 %    Basophil% 0.3 0.0 - 1.9 %    Differential Method Automated    Comprehensive metabolic panel    Collection Time: 03/10/19 11:30 AM   Result Value Ref Range    Sodium 139 136 - 145 mmol/L    Potassium 3.9 3.5 - 5.1 mmol/L    Chloride 99 95 - 110 mmol/L    CO2 23 23 - 29 mmol/L    Glucose 142 (H) 70 - 110 mg/dL    BUN, Bld 38 (H) 6 - 20 mg/dL    Creatinine 6.3 (H) 0.5 - 1.4 mg/dL    Calcium 7.8 (L) 8.7 - 10.5 mg/dL    Total Protein 7.2 6.0 - 8.4 g/dL    Albumin 3.0 (L) 3.5 - 5.2 g/dL    Total Bilirubin 0.7 0.1 - 1.0 mg/dL    Alkaline Phosphatase 129 55 - 135 U/L    AST 31 10 - 40 U/L    ALT 19 10 - 44 U/L    Anion Gap 17 (H) 8 - 16 mmol/L    eGFR if African American 7.8 (A) >60 mL/min/1.73 m^2    eGFR if non  6.8 (A) >60 mL/min/1.73 m^2   Lactic acid, plasma    Collection Time: 03/10/19 11:30 AM   Result Value Ref Range    Lactate (Lactic Acid) 2.7 (H) 0.5 - 2.2 mmol/L   Protime-INR    Collection Time: 03/10/19 11:30 AM   Result Value Ref Range    Prothrombin Time 24.1 (H) 9.0 - 12.5 sec    INR 2.5 (H) 0.8 - 1.2   Troponin I    Collection Time: 03/10/19 11:30 AM   Result Value Ref Range    Troponin I 0.173 (H)  0.000 - 0.026 ng/mL   TSH    Collection Time: 03/10/19 11:30 AM   Result Value Ref Range    TSH 1.539 0.400 - 4.000 uIU/mL       No results for input(s): POCTGLUCOSE in the last 168 hours.    Active Hospital Problems    Diagnosis  POA    *Pneumonia of left lower lobe due to infectious organism [J18.1]  Yes    Severe obesity (BMI 35.0-35.9 with comorbidity) [E66.01, Z68.35]  Not Applicable    Diabetes mellitus type II, controlled [E11.9]  Yes    End stage renal failure on dialysis [N18.6, Z99.2]  Not Applicable     Chronic    Permanent atrial fibrillation [I48.2]  Yes    Hemodialysis-associated hypotension [I95.3]  Yes    Immunosuppressed status [D89.9]  Yes    Chronic respiratory failure with hypoxia [J96.11]  Yes    Current use of long term anticoagulation [Z79.01]  Not Applicable    Anemia in ESRD (end-stage renal disease) [N18.6, D63.1]  Yes    Hyperlipidemia [E78.5]  Yes     Chronic    Pulmonary hypertension [I27.20]  Yes    Chronic combined systolic and diastolic heart failure [I50.42]  Yes     Chronic     EF 20% 2013, improved with Medical therapy, negative PET 2013      Pulmonary sarcoidosis [D86.0]  Yes     Chronic      Resolved Hospital Problems   No resolved problems to display.       IMAGING:   CXR  FINDINGS:  There are bilateral perihilar reticular lung opacities that may represent edema.  There is nonspecific enlargement of the cardiac silhouette, mediastinum, and pulmonary vasculature.  There is a stable right-sided transvenous pacemaker.  No pneumothorax.    The osseous structures are unremarkable.      Impression       #1. As above.         CT abd/pelvis  FINDINGS:  Heart: Heart is enlarged.  There is no pericardial effusion.  Pacing wires are visualized in the right and left heart.    Lung Bases: There is patchy central ground-glass density and slightly more confluent consolidative opacity in the left lower lobe with air bronchograms.  No pleural effusion, pleural thickening, or  pneumothorax.    Liver: Normal in size and attenuation without focal hepatic abnormality. Portal vein and hepatic veins are patent.    Gallbladder: Normal appearance without evidence for cholecystitis.    Bile Ducts: No intrahepatic biliary ductal dilatation.  The extrahepatic supra pancreatic common bile duct is mildly prominent measuring 6 mm and tapers normally to the level of the ampulla.    Pancreas: No pancreatic mass lesion or peripancreatic inflammatory change.    Spleen: Normal size without focal abnormality.  There is a small accessory spleen.    Adrenals: Normal.    Genitourinary: Both kidneys are atrophic with marked cortical thinning and there is no contrast excretion on delayed images compatible with chronic medical renal disease.  There are multiple small round hypoattenuating foci which are too small to characterize.  Largest renal hypodensity measures 1.7 cm with slightly greater than simple fluid attenuation suggesting a mildly complex cyst as described on prior ultrasound 12/08/2017.  Bladder walls are diffusely thickened likely due to incomplete distension.  There is atherosclerotic calcification at the proximal right renal artery likely resulting in hemodynamically significant stenosis.    Reproductive organs: Normal size with focal dystrophic calcification at the uterine fundus, likely a degenerating fibroid.    GI tract/Mesentery: Stomach and distal esophagus are normal.  High-density material scattered throughout the colon and rectum, most likely related to recent oral contrast administration. Colonic diverticulosis.  Visualized loops of small and large bowel are normal in caliber without evidence for inflammation or obstruction.  Normal appendix.    Peritoneal Space: No abdominopelvic ascites or intraperitoneal free air.    Retroperitoneum: No significant adenopathy.    Abdominal wall: Normal.    Vasculature: Aortic ectasia and dense aortoiliac atherosclerotic calcification with probable  hemodynamically significant stenosis within the right external iliac artery and right proximal renal artery.    Bones: Normal appearance without acute fracture or bony destructive process.      Impression       Marked cardiomegaly with bibasilar consolidation, possibly infection or aspiration, slightly more confluent in the left lower lobe.    Diffuse bladder wall thickening likely due to incomplete distension.  Correlate with urinalysis to exclude cystitis.    Aortic ectasia and dense aortoiliac atherosclerotic calcification with probable hemodynamically significant stenosis within the right external iliac artery and right proximal renal artery.    Additional stable findings as detailed above.         EKG:   **Age and gender specific analysis**  Electronic ventricular pacemaker  When compared with ECG of 31-DEC-2018 00:44,  No significant change was found  Confirmed by Edison Robin MD ([id number] on 3/10/2019 1:44:41 PM      Assessment and Plan:  1. CAP with symptomatic hypotension, likely secondary to infection versus adrenal insufficiency which is chronic  - given Levaquin in ED, will continue  - check blood cultures x2, respiratory culture  - check rapid flu  - tele monitoring  - lactic acid elevated, will recheck  - continue home O2    2.  Dm 2, non-insulin dependent  - hold home meds  - ISS  - bedside glucose monitoring  - diabetic diet  - check A1c    3.  Hyperlipidemia  - chronic and stable  - continue statin    4.  Pulmonary sarcoidosis  - continue steroids  - continue home oxygen    5.  Paroxysmal Afib on chronic anticoagulation  - tele monitoring  - continue Coumadin  - daily PT/INR    6.  ESRD on on HD, TThSat  - consult Nephrology, appreciate recs    7.  Chronic combined systolic and diastolic heart failure  - does not appear in acute exacerbation  - check 2D echo  - on HD    8.  Pulmonary hypertension, chronic respiratory failure on home oxygen  - follows with pulmonology here  - continue home O2    9.   Secondary adrenal insufficiency  - continue Florinef, midodrine    10.  Right lower extremity wounds  - consult wound care    11.  Seizure disorder  - chronic and stable  - continue Keppra    12. Elevated troponin  - trend out, baseline is around 0.1  - tele monitoring    13.  Morbid obesity  - counseled on diet and exercise    14. TIA  - continue aspirin, plavix      Diet:  Diabetic  DVT PPx:  Coumadin  GI prophylaxis:  Ppi  Code status: FULL    Dispo:  Continue antibiotics for cap, check blood cultures and respiratory culture, follow-up with Nephrology in the a.m. for possible dialysis       Faye Crespo MD  [facility] Medicine Staff  [phone] pager

## 2019-03-11 PROBLEM — L98.499 SKIN ULCER: Status: ACTIVE | Noted: 2019-01-01

## 2019-03-11 NOTE — CONSULTS
"[facility]  Nephrology  Consult Note    Patient Name: Sisi Medel  MRN: [id number]  Admission Date: 3/10/2019  Hospital Length of Stay: 1 days  Attending Provider: Faye Crespo MD   Primary Care Physician: Carlos A Caputo MD  Principal Problem:Pneumonia of left lower lobe due to infectious organism    Inpatient consult to Nephrology  Consult performed by: Toni Fernando, IVETH  Consult ordered by: Faye Crespo MD  Reason for consult: ESRD TTS        Subjective:     HPI: Ms. Medel is a 59 yo AAF with pulmonary sarcoidosis, seizure disorder, PAF, hyperlipidemia,  HFrEF, pHTN,  obesity, and ESRD on iHD TTS who presented to the hospital on the advice of her OP dialysis unit for further evaluation of worsening intradialytic hypotension.  She reports having chronic hypotension that she takes midodrine for, but for the past 2 dialysis treatments, she has been unable to obtain her EDW due to hypotension.  She is asymptomatic during events, but does not that she gets dizzy when standing after her dialysis session.  She uses O2 @ home @ 3L NC, noted to feel more SOB than normal the past week.  She has a dry cough, denies fever or chills or generalized malaise.  CT imaging of lower lungs zones read as "slightly more confluent consolidative opacity in the left lower lobe with air bronchograms" and she was started on ABx therapy.  Nephrology has been consulted for ESRD management while in-patient.    She dialyzes on a MWF TTS schedule at [facility] under the care of Dr. Wilson.  She reports an EDW of 115 kg and a treatment time of 5 hours.  She denies any recent cramping during her treatments, she endorses worsening hypotension over the past week requiring pause in her ultrafiltration.  During her last OP treatment, she reports leaving around 1.9 kg above her EDW.  Appetites have been good and denies any recent weight loss.  She no longer has residual renal function.  She is scheduled " to see Vascular Surgery this Thursday for evaluation of high venous pressures on the dialysis machine, she denies any increased bleeding post treatment.          Past Medical History:   Diagnosis Date    Anemia in ESRD (end-stage renal disease) 3/7/2016    Anticoagulant long-term use     Cervical radiculopathy 7/20/2015    CHF (congestive heart failure)     Chronic combined systolic and diastolic heart failure 6/14/2013    EF 20% 2013, improved with Medical therapy, negative PET 2013    Chronic respiratory failure with hypoxia 04/22/2013    On home oxygen at 2-5 liters    Closed fracture of proximal end of right fibula 6/27/2016    Complications due to renal dialysis device, implant, and graft     DDD (degenerative disc disease), lumbar 6/17/2015    Dependence on renal dialysis     Diabetes mellitus type II, controlled 12/8/2017    ESRD on hemodialysis 2/23/2016    Essential hypertension     Gout     Lateral meniscal tear 5/31/2016    Lumbar stenosis 6/17/2015    Obesity, Class III, BMI 40-49.9 (morbid obesity)     Pacemaker     Paroxysmal atrial fibrillation 5/16/2014    Not on anticoagulation    Peripheral neuropathy 11/13/2013    Personal history of gastric ulcer 3/19/2013    Sarcoidosis, lung 2009    Diagnosed in 2009 with ocular manifestation, on 4L home O2 and PO steroids    Secondary pulmonary hypertension 4/7/2015    Seizure disorder 3/19/2013    Shingles 11/13/2013    Thyroid disease        Past Surgical History:   Procedure Laterality Date    ABDOMINAL SURGERY      ABLATION N/A 6/12/2017    Performed by Bladimir [facility]orno [facility] at [facility] CATH LAB    ANGIOPLASTY Left 1/10/2018    Performed by Torrey Villafana MD at [facility] OR 2ND FLR    ANGIOPLASTY-PERCUTANEOUS TRANSLUMINAL (PTA) Left 2/7/2017    Performed by Tor[facility] MD at [facility] OR 2ND FLR    ANGIOPLASTY-PERCUTANEOUS TRANSLUMINAL (PTA) Left 10/12/2016    Performed by Torrey [facility]afana MD at [facility] OR 2ND FLR    CARDIAC  PACEMAKER PLACEMENT       SECTION      x2    DECLOT GRAFT PERCUTANEOUS Left 2017    Performed by [facility] at [facility] OR 2ND FLR    DECLOT-GRAFT Left 2016    Performed by Harjit Starr MD at [facility] CATH LAB    DECLOT-GRAFT Left 2016    Performed by Harjit Starr MD at [facility] CATH LAB    ECHOCARDIOGRAM-TRANSESOPHAGEAL N/A 2013    Performed by Delmy Surgeon at [facility] DELMY    fistulogram Left 1/10/2018    Performed by Torrey Villafana MD at [facility] OR 2ND FLR    FISTULOGRAM Left 2017    Performed by Torrey Villafana MD at [facility] CATH LAB    FISTULOGRAM Left 10/12/2016    Performed by Torrey Villafana MD at [facility] OR 2ND FLR    FISTULOGRAM Left 2016    Performed by Harjit Starr MD at [facility] CATH LAB    Fistulogram, LUE AVG, possible intervention Left 2019    Performed by Torrey Villafana MD at [facility] CATH LAB    INJECTION-STEROID-EPIDURAL-TRANSFORAMINAL Right 2015    Performed by Patria Gutierrez [facility] at [facility] PAIN MGT    INJECTION-STEROID-EPIDURAL-TRANSFORAMINAL Right 2015    [facility]    CGKUJVFOA-[id number]-[id number] / Left upper AV graft. Left 2/3/2016    Performed by Torrey [facility]afana MD at [facility] OR 2ND FLR    LBMWGAFPB-TQYWMMAVZ-[id number] N/A 2017    Performed by Bladimir Adorno MD at [facility] CATH LAB    OTHER SURGICAL HISTORY      loop recorder implant    PLACEMENT-STENT Left 2017    Performed by Torrey Villafana MD at [facility] OR 2ND FLR    PTA, AV FISTULA N/A 2019    Performed by Torrey Villafana MD at [facility] CATH LAB    stent to fistula      TRANSESOPHAGEAL ECHOCARDIOGRAM (JARAD) N/A 2016    Performed by Sourav Machuca MD at [facility] CATH LAB    ULTRASOUND, UPPER GI TRACT, ENDOSCOPIC N/A 2014    Performed by Stewart Burgess MD at [facility] ENDO (2ND FLR)    VASCULAR SURGERY      fistula to L upper arm        Review of patient's allergies indicates:   Allergen Reactions     Bactrim [sulfamethoxazole-trimethoprim] Other (See Comments)     Causes renal failure    Nsaids (non-steroidal anti-inflammatory drug) Other (See Comments)     Renal failure     Current Facility-Administered Medications   Medication Frequency    acetaminophen tablet 650 mg Q4H PRN    aspirin EC tablet 81 mg Daily    atorvastatin tablet 80 mg Daily    bacitracin-polymyxin b ointment Daily    ciclopirox 0.77 % cream Daily    clopidogrel tablet 75 mg Daily    dextrose 50% injection 12.5 g PRN    dextrose 50% injection 25 g PRN    fludrocortisone tablet 100 mcg BID    glucagon (human recombinant) injection 1 mg PRN    glucose chewable tablet 16 g PRN    glucose chewable tablet 24 g PRN    insulin aspart U-100 pen 0-5 Units QID (AC + HS) PRN    ketorolac 0.5% ophthalmic solution 1 drop BID    levETIRAcetam tablet 500 mg BID    levoFLOXacin 500 mg/100 mL IVPB 500 mg Q48H    midodrine tablet 5 mg TID WM    nortriptyline capsule 25 mg with dinner    ondansetron disintegrating tablet 8 mg Q8H PRN    pantoprazole EC tablet 40 mg Daily    polyethylene glycol packet 17 g Daily    prednisoLONE acetate 1 % ophthalmic suspension 1 drop TID    predniSONE tablet 20 mg Daily    pregabalin capsule 100 mg TID    promethazine tablet 25 mg Q6H PRN    ramelteon tablet 8 mg Nightly PRN    senna-docusate 8.6-50 mg per tablet 1 tablet BID    sevelamer carbonate tablet 800 mg TID WM    sodium chloride 0.9% flush 5 mL PRN    sodium chloride 0.9% flush 5 mL PRN    tiZANidine tablet 4 mg Daily PRN    warfarin (COUMADIN) tablet 5 mg Daily     Family History     Problem Relation (Age of Onset)    Coronary artery disease Father    Diabetes Mother, Father, Maternal Grandmother    Hypertension Mother, Father    Kidney failure Mother    Lupus Sister        Tobacco Use    Smoking status: Former Smoker     Packs/day: 0.50     Years: 10.00     Pack years: 5.00     Types: Cigarettes     Last attempt to quit: 4/22/1994      Years since quittin.9    Smokeless tobacco: Never Used   Substance and Sexual Activity    Alcohol use: No     Alcohol/week: 0.0 oz     Comment: rarely    Drug use: No    Sexual activity: Not Currently     Review of Systems   Constitutional: Negative for activity change, appetite change, chills, fatigue and fever.   HENT: Negative for congestion, ear pain, postnasal drip, rhinorrhea, sinus pressure and sinus pain.    Respiratory: Positive for cough and shortness of breath. Negative for apnea and wheezing.    Cardiovascular: Positive for leg swelling. Negative for chest pain and palpitations.   Gastrointestinal: Negative for abdominal distention, abdominal pain, constipation, diarrhea, nausea and vomiting.   Musculoskeletal: Negative for arthralgias, joint swelling and myalgias.   Neurological: Positive for dizziness and light-headedness. Negative for syncope and headaches.   Psychiatric/Behavioral: Negative for agitation, behavioral problems and confusion.     Objective:     Vital Signs (Most Recent):  Temp: 97.5 °F (36.4 °C) (19)  Pulse: 70 (19)  Resp: (!) 24 (19)  BP: 111/79 (19)  SpO2: 97 % (19)  O2 Device (Oxygen Therapy): nasal cannula (19) Vital Signs (24h Range):  Temp:  [97.3 °F (36.3 °C)-97.5 °F (36.4 °C)] 97.5 °F (36.4 °C)  Pulse:  [68-74] 70  Resp:  [16-26] 24  SpO2:  [97 %-100 %] 97 %  BP: ()/(54-95) 111/79     Weight: 119.3 kg (263 lb 0.1 oz) (19)  Body mass index is 39.99 kg/m².  Body surface area is 2.39 meters squared.    I/O last 3 completed shifts:  In: 250 [IV Piggyback:250]  Out: -     Physical Exam   Constitutional: She is oriented to person, place, and time. She appears well-developed and well-nourished. No distress.   Obese   HENT:   Head: Normocephalic and atraumatic.   Right Ear: External ear normal.   Left Ear: External ear normal.   Eyes: Conjunctivae and EOM are normal. Right eye exhibits no  discharge. Left eye exhibits no discharge. No scleral icterus.   Neck: Normal range of motion.   Cardiovascular: Normal rate and regular rhythm. Exam reveals no gallop and no friction rub.   Murmur heard.  AVG to RAISSA ++ bruit thrill  High pitched bruit near arterial anastamosis   Pulmonary/Chest: Effort normal. No respiratory distress. She has no wheezes. She has no rales.   Abdominal: Soft. Bowel sounds are normal. She exhibits no distension. There is no tenderness.   Musculoskeletal: She exhibits edema (trace LE). She exhibits no deformity.   Neurological: She is alert and oriented to person, place, and time.   Skin: Skin is warm and dry. She is not diaphoretic.   Bandage intact to RLE   Psychiatric: She has a normal mood and affect. Her behavior is normal.       Significant Labs:  CBC:   Recent Labs   Lab 03/11/19  0827   WBC 10.55   RBC 4.31   HGB 11.9*   HCT 38.2   *   MCV 89   MCH 27.6   MCHC 31.2*     CMP:   Recent Labs   Lab 03/10/19  1130 03/11/19  0827   * 94   CALCIUM 7.8* 7.3*   ALBUMIN 3.0*  --    PROT 7.2  --     133*   K 3.9 4.3   CO2 23 21*   CL 99 97   BUN 38* 53*   CREATININE 6.3* 7.9*   ALKPHOS 129  --    ALT 19  --    AST 31  --    BILITOT 0.7  --            Assessment/Plan:     End stage renal failure on dialysis    ESRD on iHD TTS [facility]  Dr. Wilson  5 hours  EDW ~ 115 kg  RAISSA AVG    57 yo female with ESRD on iHD TTS who presents for evaluation of worsening intradialytic hypotension despite use of midodrine.  She last reached her EDW last week, but the last few sessions she has had problems with blood pressure.  Nephrology has been consulted for ESRD management while in-patient.    Plan/Recommendations:  No indication for RRT today  Plan for HD tomorrow, will use her OP prescription   Recommend midodrine 15 mg PO 30 minutes prior to HD  Will attempt an ultrafiltration goal down to her EDW  Phos levels daily  Continue renvela 800 mg PO TID with meals  Renal  kandis Gallegos, IVETH  Nephrology  [facility]

## 2019-03-11 NOTE — PLAN OF CARE
Problem: Adult Inpatient Plan of Care  Goal: Plan of Care Review  Outcome: Ongoing (interventions implemented as appropriate)  Duration of HD and amt of fluid to be removed

## 2019-03-11 NOTE — CONSULTS
PHARMACY CONSULT NOTE: WARFARIN  Sisi Medel is a 58 y.o. female on warfarin therapy for Atrial Fibrillation. PharmD has been consulted for warfarin dosing.    Current order: 5mg daily  Home dose: 5mg daily  Coumadin clinic enrollment: Active  INR goal: 2-3    Lab Results   Component Value Date    INR 2.1 (H) 03/11/2019    INR 2.5 (H) 03/10/2019    INR 2.8 (H) 03/08/2019     Significant drug interactions: levofloxacin  Diet: Adult Diabetic without supplements     Recommendation(s):    INR 2.1 today, warfarin has been held since admission. Agree with continuing 5mg (home dose), should get a boost from being on levofloxacin as well.   INR daily (ordered).   Pharmacy will continue to follow and monitor warfarin    Thank you for the consult,  Ly Bright, PharmD, BCPS  [id number]    **Note: Consults are reviewed Monday-Friday 7:00am-3:30pm. THE ABOVE RECOMMENDATIONS ARE ONLY SUGGESTED.THE RECOMMENDATIONS SHOULD BE CONSIDERED IN CONJUNCTION WITH ALL PATIENT FACTORS. **

## 2019-03-11 NOTE — HPI
"Ms. Medel is a 57 yo AAF with pulmonary sarcoidosis, seizure disorder, PAF, hyperlipidemia, HFrEF, pHTN, obesity, and ESRD on iHD TTS who presented to the hospital on the advice of her OP dialysis unit for further evaluation of worsening intradialytic hypotension.  She reports having chronic hypotension that she takes midodrine for, but for the past 2 dialysis treatments, she has been unable to obtain her EDW due to hypotension.  She is asymptomatic during events, but does not that she gets dizzy when standing after her dialysis session.  She uses O2 @ home @ 3L NC, noted to feel more SOB than normal the past week.  She has a dry cough, denies fever or chills or generalized malaise.  CT imaging of lower lungs zones read as "slightly more confluent consolidative opacity in the left lower lobe with air bronchograms" and she was started on ABx therapy.  Nephrology has been consulted for ESRD management while in-patient.    She dialyzes on a MWF TTS schedule at [facility] under the care of Dr. Wilson.  She reports an EDW of 115 kg and a treatment time of 5 hours.  She denies any recent cramping during her treatments, she endorses worsening hypotension over the past week requiring pause in her ultrafiltration.  During her last OP treatment, she reports leaving around 1.9 kg above her EDW.  Appetites have been good and denies any recent weight loss.  She no longer has residual renal function.  She is scheduled to see Vascular Surgery this Thursday for evaluation of high venous pressures on the dialysis machine, she denies any increased bleeding post treatment.        "

## 2019-03-11 NOTE — SUBJECTIVE & OBJECTIVE
Past Medical History:   Diagnosis Date    Anemia in ESRD (end-stage renal disease) 3/7/2016    Anticoagulant long-term use     Cervical radiculopathy 2015    CHF (congestive heart failure)     Chronic combined systolic and diastolic heart failure 2013    EF 20% , improved with Medical therapy, negative PET     Chronic respiratory failure with hypoxia 2013    On home oxygen at 2-5 liters    Closed fracture of proximal end of right fibula 2016    Complications due to renal dialysis device, implant, and graft     DDD (degenerative disc disease), lumbar 2015    Dependence on renal dialysis     Diabetes mellitus type II, controlled 2017    ESRD on hemodialysis 2016    Essential hypertension     Gout     Lateral meniscal tear 2016    Lumbar stenosis 2015    Obesity, Class III, BMI 40-49.9 (morbid obesity)     Pacemaker     Paroxysmal atrial fibrillation 2014    Not on anticoagulation    Peripheral neuropathy 2013    Personal history of gastric ulcer 3/19/2013    Sarcoidosis, lung 2009    Diagnosed in  with ocular manifestation, on 4L home O2 and PO steroids    Secondary pulmonary hypertension 2015    Seizure disorder 3/19/2013    Shingles 2013    Thyroid disease        Past Surgical History:   Procedure Laterality Date    ABDOMINAL SURGERY      ABLATION N/A 2017    Performed by [facility]imir [facility]orno [facility] at [facility] CATH LAB    ANGIOPLASTY Left 1/10/2018    Performed by Torrey Villafana MD at [facility] OR 2ND FLR    ANGIOPLASTY-PERCUTANEOUS TRANSLUMINAL (PTA) Left 2017    Performed by Torrey [facility]afana MD at [facility] OR 2ND FLR    ANGIOPLASTY-PERCUTANEOUS TRANSLUMINAL (PTA) Left 10/12/2016    Performed by Torrey [facility]afana MD at [facility] OR 2ND FLR    CARDIAC PACEMAKER PLACEMENT       SECTION      x2    DECLOT GRAFT PERCUTANEOUS Left 2017    [facility]     DECLOT-GRAFT Left 6/21/2016    Performed by [facility]jit Starr MD at [facility] CATH LAB    DECLOT-GRAFT Left 6/20/2016    Performed by Harjit Starr MD at [facility] CATH LAB    ECHOCARDIOGRAM-TRANSESOPHAGEAL N/A 6/27/2013    Performed by Delmy Surgeon at [facility] DELMY    fistulogram Left 1/10/2018    Performed by Torrey Villafana MD at [facility] OR 2ND FLR    FISTULOGRAM Left 9/13/2017    Performed by Torrey Villafana MD at [facility] CATH LAB    FISTULOGRAM Left 10/12/2016    Performed by Torrey Villafana MD at [facility] OR 2ND FLR    FISTULOGRAM Left 6/21/2016    Performed by Harjit Starr MD at [facility] CATH LAB    Fistulogram, LUE AVG, possible intervention Left 1/30/2019    Performed by Torrey Villafana MD at [facility] CATH LAB    INJECTION-STEROID-EPIDURAL-TRANSFORAMINAL Right 7/20/2015    Performed by Pat[facility]rez [facility] at [facility] PAIN MGT    INJECTION-STEROID-EPIDURAL-TRANSFORAMINAL Right 5/21/2015    [facility] at [facility] MGT    [id number] / Left upper AV graft. Left 2/3/2016    Performed by Torrey Villafana MD at [facility] OR 2ND FLR    [id number]-[id number]-[id number] N/A 1/11/2017    Performed by Bladimir Adorno MD at [facility] CATH LAB    OTHER SURGICAL HISTORY      loop recorder implant    PLACEMENT-STENT Left 2/7/2017    Performed by Torrey Villafana MD at [facility] OR 2ND FLR    PTA, AV FISTULA N/A 1/30/2019    Performed by Torrey Villafana MD at [facility] CATH LAB    stent to fistula      TRANSESOPHAGEAL ECHOCARDIOGRAM (JARAD) N/A 6/27/2016    Performed by Sourav Machuca MD at [facility] CATH LAB    ULTRASOUND, UPPER GI TRACT, ENDOSCOPIC N/A 1/9/2014    Performed by Stewart Burgess MD at [facility] ENDO (2ND FLR)    VASCULAR SURGERY      fistula to L upper arm        Review of patient's allergies indicates:   Allergen Reactions    Bactrim [sulfamethoxazole-trimethoprim] Other (See Comments)     Causes renal failure    Nsaids (non-steroidal anti-inflammatory drug) Other (See  Comments)     Renal failure     Current Facility-Administered Medications   Medication Frequency    acetaminophen tablet 650 mg Q4H PRN    aspirin EC tablet 81 mg Daily    atorvastatin tablet 80 mg Daily    bacitracin-polymyxin b ointment Daily    ciclopirox 0.77 % cream Daily    clopidogrel tablet 75 mg Daily    dextrose 50% injection 12.5 g PRN    dextrose 50% injection 25 g PRN    fludrocortisone tablet 100 mcg BID    glucagon (human recombinant) injection 1 mg PRN    glucose chewable tablet 16 g PRN    glucose chewable tablet 24 g PRN    insulin aspart U-100 pen 0-5 Units QID (AC + HS) PRN    ketorolac 0.5% ophthalmic solution 1 drop BID    levETIRAcetam tablet 500 mg BID    levoFLOXacin 500 mg/100 mL IVPB 500 mg Q48H    midodrine tablet 5 mg TID WM    nortriptyline capsule 25 mg with dinner    ondansetron disintegrating tablet 8 mg Q8H PRN    pantoprazole EC tablet 40 mg Daily    polyethylene glycol packet 17 g Daily    prednisoLONE acetate 1 % ophthalmic suspension 1 drop TID    predniSONE tablet 20 mg Daily    pregabalin capsule 100 mg TID    promethazine tablet 25 mg Q6H PRN    ramelteon tablet 8 mg Nightly PRN    senna-docusate 8.6-50 mg per tablet 1 tablet BID    sevelamer carbonate tablet 800 mg TID WM    sodium chloride 0.9% flush 5 mL PRN    sodium chloride 0.9% flush 5 mL PRN    tiZANidine tablet 4 mg Daily PRN    warfarin (COUMADIN) tablet 5 mg Daily     Family History     Problem Relation (Age of Onset)    Coronary artery disease Father    Diabetes Mother, Father, Maternal Grandmother    Hypertension Mother, Father    Kidney failure Mother    Lupus Sister        Tobacco Use    Smoking status: Former Smoker     Packs/day: 0.50     Years: 10.00     Pack years: 5.00     Types: Cigarettes     Last attempt to quit: 1994     Years since quittin.9    Smokeless tobacco: Never Used   Substance and Sexual Activity    Alcohol use: No     Alcohol/week: 0.0 oz      Comment: rarely    Drug use: No    Sexual activity: Not Currently     Review of Systems   Constitutional: Negative for activity change, appetite change, chills, fatigue and fever.   HENT: Negative for congestion, ear pain, postnasal drip, rhinorrhea, sinus pressure and sinus pain.    Respiratory: Positive for cough and shortness of breath. Negative for apnea and wheezing.    Cardiovascular: Positive for leg swelling. Negative for chest pain and palpitations.   Gastrointestinal: Negative for abdominal distention, abdominal pain, constipation, diarrhea, nausea and vomiting.   Musculoskeletal: Negative for arthralgias, joint swelling and myalgias.   Neurological: Positive for dizziness and light-headedness. Negative for syncope and headaches.   Psychiatric/Behavioral: Negative for agitation, behavioral problems and confusion.     Objective:     Vital Signs (Most Recent):  Temp: 97.5 °F (36.4 °C) (03/11/19 0836)  Pulse: 70 (03/11/19 0836)  Resp: (!) 24 (03/11/19 0836)  BP: 111/79 (03/11/19 0836)  SpO2: 97 % (03/11/19 0836)  O2 Device (Oxygen Therapy): nasal cannula (03/11/19 0011) Vital Signs (24h Range):  Temp:  [97.3 °F (36.3 °C)-97.5 °F (36.4 °C)] 97.5 °F (36.4 °C)  Pulse:  [68-74] 70  Resp:  [16-26] 24  SpO2:  [97 %-100 %] 97 %  BP: ()/(54-95) 111/79     Weight: 119.3 kg (263 lb 0.1 oz) (03/11/19 0011)  Body mass index is 39.99 kg/m².  Body surface area is 2.39 meters squared.    I/O last 3 completed shifts:  In: 250 [IV Piggyback:250]  Out: -     Physical Exam   Constitutional: She is oriented to person, place, and time. She appears well-developed and well-nourished. No distress.   Obese   HENT:   Head: Normocephalic and atraumatic.   Right Ear: External ear normal.   Left Ear: External ear normal.   Eyes: Conjunctivae and EOM are normal. Right eye exhibits no discharge. Left eye exhibits no discharge. No scleral icterus.   Neck: Normal range of motion.   Cardiovascular: Normal rate and regular rhythm. Exam  reveals no gallop and no friction rub.   Murmur heard.  AVG to RAISSA ++ bruit thrill  High pitched bruit near arterial anastamosis   Pulmonary/Chest: Effort normal. No respiratory distress. She has no wheezes. She has no rales.   Abdominal: Soft. Bowel sounds are normal. She exhibits no distension. There is no tenderness.   Musculoskeletal: She exhibits edema (trace LE). She exhibits no deformity.   Neurological: She is alert and oriented to person, place, and time.   Skin: Skin is warm and dry. She is not diaphoretic.   Bandage intact to RLE   Psychiatric: She has a normal mood and affect. Her behavior is normal.       Significant Labs:  CBC:   Recent Labs   Lab 03/11/19  0827   WBC 10.55   RBC 4.31   HGB 11.9*   HCT 38.2   *   MCV 89   MCH 27.6   MCHC 31.2*     CMP:   Recent Labs   Lab 03/10/19  1130 03/11/19  0827   * 94   CALCIUM 7.8* 7.3*   ALBUMIN 3.0*  --    PROT 7.2  --     133*   K 3.9 4.3   CO2 23 21*   CL 99 97   BUN 38* 53*   CREATININE 6.3* 7.9*   ALKPHOS 129  --    ALT 19  --    AST 31  --    BILITOT 0.7  --

## 2019-03-11 NOTE — ASSESSMENT & PLAN NOTE
[facility]  5 hours  EDW ~ 115 kg  RAISSA AVG    57 yo female with ESRD on iHD TTS who presents for evaluation of worsening intradialytic hypotension despite use of midodrine.  She last reached her EDW last week, but the last few sessions she has had problems with blood pressure.  Nephrology has been consulted for ESRD management while in-patient.    Plan/Recommendations:  No indication for RRT today  Plan for HD tomorrow, will use her OP prescription   Recommend midodrine 15 mg PO 30 minutes prior to HD  Will attempt an ultrafiltration goal down to her EDW  Phos levels daily  Continue renvela 800 mg PO TID with meals  Renal diet

## 2019-03-11 NOTE — PROGRESS NOTES
Wound care consult received from MD for R leg wound.  Pt admitted with pneumonia of left lower lobe due to infectious organism.  Pt reports her wounds to R leg have been managed by outpatient wound care, Lilian Haywood NP, who has prescribed MediHoney daily. Upon assessment to R lower leg, noted healed wound to R medial lower leg and full-thickness ulceration to R lateral lower leg with small satellite ulcerations.    Spoke with pt and Dr. Crespo regarding recommendations to continue with MediHoney daily to L lateral lower leg wound.  Wound care team to follow prn  [id number]     03/11/19 1134       Wound 03/11/19 Ulceration lateral Leg   Date First Assessed: 03/11/19   Pre-existing: Yes  Primary Wound Type: Ulceration  Side: Right  Orientation: lateral  Location: Leg   Wound Image    Wound WDL ex   Dressing Appearance Intact   Drainage Amount Small   Drainage Characteristics/Odor Serosanguineous   Appearance Pink;Red;Slough   Tissue loss description Full thickness   Red (%), Wound Tissue Color 90 %   Yellow (%), Wound Tissue Color 10 %   Periwound Area Denuded  (satelite wounds)   Wound Length (cm) 2.3 cm   Wound Width (cm) 1.5 cm   Wound Depth (cm) 0.2 cm   Wound Volume (cm^3) 0.69 cm^3   Wound Surface Area (cm^2) 3.45 cm^2   Care Cleansed with:;Sterile normal saline   Dressing Applied;Honey;Foam   Dressing Change Due 03/11/19

## 2019-03-11 NOTE — PROGRESS NOTES
Hospital Medicine   Progress note   Team: [facility] HOSP MED O Faye Crespo MD   Admit Date: 3/10/2019   JACKELYN    Code status: Full Code   Principal Problem: Pneumonia of left lower lobe due to infectious organism     Interval hx:  Admitted overnight for pneumonia.  Afebrile.  Patient feels like breathing is little better today.  Still constipated.  Nephro to evaluate today for dialysis.  Lactic acidosis resolved.    ROS   Constitutional: Negative for  fever. +chills, fatigue  HENT: Negative for sore throat, trouble swallowing.    Eyes: Negative for photophobia, visual disturbance.   Respiratory: +cough, shortness of breath.    Cardiovascular: Negative for chest pain, palpitations, leg swelling.   Gastrointestinal: Negative for diarrhea, nausea, vomiting. +constipation, abdominal pain   Endocrine: Negative for cold intolerance, heat intolerance.   Genitourinary: Negative for dysuria, frequency. +anuric  Musculoskeletal: Negative for arthralgias, myalgias.   Skin: Negative for rash, erythema , +RLE wound  Neurological: Negative for syncope, +dizziness, weakness, light-headedness, near syncope.   Psychiatric/Behavioral: Negative for confusion, hallucinations, anxiety  All other systems reviewed and are negative.      PEx   Temp:  [97.3 °F (36.3 °C)-97.9 °F (36.6 °C)]   Pulse:  [68-75]   Resp:  [14-26]   BP: ()/(67-95)   SpO2:  [95 %-100 %]      I & O (Last 24H):     Intake/Output Summary (Last 24 hours) at 3/11/2019 1610  Last data filed at 3/10/2019 1715  Gross per 24 hour   Intake 150 ml   Output --   Net 150 ml       General appearance: no distress, alert and oriented x 3  HEENT:  conjunctivae/corneas clear, PERRL, mucous membranes dry   Neck: supple, thyroid not enlarged  Pulm:   normal respiratory effort, decreased breath sounds in bases with rhonchi noted in b/l bases, no wheezes, rales, +O2 via NC  Card: RRR, S1, S2 normal, no murmur, click, rub or gallop, +pacer in chest  Abd: soft, NT, ND, BS present; no  masses, no organomegaly, obese  Ext: mild pedal edema b/l, RLE with dressing in place c/d/i  Pulses: 2+, symmetric  Skin: color, texture, turgor normal. No rashes or lesions  Neuro: CN II-XII grossly intact, no focal numbness or weakness, normal strength and tone   Psych: normal mood and affect        Recent Results (from the past 24 hour(s))   Hemoglobin A1c    Collection Time: 03/10/19  8:41 PM   Result Value Ref Range    Hemoglobin A1C 6.2 (H) 4.0 - 5.6 %    Estimated Avg Glucose 131 68 - 131 mg/dL   Blood culture    Collection Time: 03/10/19  8:41 PM   Result Value Ref Range    Blood Culture, Routine No Growth to date    Troponin I    Collection Time: 03/10/19  8:41 PM   Result Value Ref Range    Troponin I 0.137 (H) 0.000 - 0.026 ng/mL   Lactic acid, plasma    Collection Time: 03/10/19  8:41 PM   Result Value Ref Range    Lactate (Lactic Acid) 1.3 0.5 - 2.2 mmol/L   Blood culture    Collection Time: 03/10/19  8:42 PM   Result Value Ref Range    Blood Culture, Routine No Growth to date    Influenza A & B by Molecular    Collection Time: 03/10/19  8:46 PM   Result Value Ref Range    Influenza A, Molecular Negative Negative    Influenza B, Molecular Negative Negative    Flu A & B Source NP    Basic Metabolic Panel (BMP)    Collection Time: 03/11/19  8:27 AM   Result Value Ref Range    Sodium 133 (L) 136 - 145 mmol/L    Potassium 4.3 3.5 - 5.1 mmol/L    Chloride 97 95 - 110 mmol/L    CO2 21 (L) 23 - 29 mmol/L    Glucose 94 70 - 110 mg/dL    BUN, Bld 53 (H) 6 - 20 mg/dL    Creatinine 7.9 (H) 0.5 - 1.4 mg/dL    Calcium 7.3 (L) 8.7 - 10.5 mg/dL    Anion Gap 15 8 - 16 mmol/L    eGFR if African American 5.9 (A) >60 mL/min/1.73 m^2    eGFR if non  5.2 (A) >60 mL/min/1.73 m^2   CBC with Automated Differential    Collection Time: 03/11/19  8:27 AM   Result Value Ref Range    WBC 10.55 3.90 - 12.70 K/uL    RBC 4.31 4.00 - 5.40 M/uL    Hemoglobin 11.9 (L) 12.0 - 16.0 g/dL    Hematocrit 38.2 37.0 - 48.5 %     MCV 89 82 - 98 fL    MCH 27.6 27.0 - 31.0 pg    MCHC 31.2 (L) 32.0 - 36.0 g/dL    RDW 17.2 (H) 11.5 - 14.5 %    Platelets 147 (L) 150 - 350 K/uL    MPV 13.0 (H) 9.2 - 12.9 fL    Immature Granulocytes 1.0 (H) 0.0 - 0.5 %    Gran # (ANC) 8.0 (H) 1.8 - 7.7 K/uL    Immature Grans (Abs) 0.11 (H) 0.00 - 0.04 K/uL    Lymph # 1.1 1.0 - 4.8 K/uL    Mono # 1.3 (H) 0.3 - 1.0 K/uL    Eos # 0.1 0.0 - 0.5 K/uL    Baso # 0.02 0.00 - 0.20 K/uL    nRBC 0 0 /100 WBC    Gran% 75.7 (H) 38.0 - 73.0 %    Lymph% 10.3 (L) 18.0 - 48.0 %    Mono% 11.8 4.0 - 15.0 %    Eosinophil% 1.0 0.0 - 8.0 %    Basophil% 0.2 0.0 - 1.9 %    Platelet Estimate Appears normal     Aniso Slight     Poly Occasional     Ovalocytes Occasional     Large/Giant Platelets Present     Differential Method Automated    Protime-INR    Collection Time: 03/11/19 10:35 AM   Result Value Ref Range    Prothrombin Time 19.9 (H) 9.0 - 12.5 sec    INR 2.1 (H) 0.8 - 1.2   POCT glucose    Collection Time: 03/11/19 12:19 PM   Result Value Ref Range    POCT Glucose 154 (H) 70 - 110 mg/dL   Transthoracic echo (TTE) complete (Cupid Only)    Collection Time: 03/11/19  4:01 PM   Result Value Ref Range    Ascending aorta 3.62 cm    STJ 3.35 cm    AV mean gradient 1.95 mmHg    Ao peak archie 0.97 m/s    Ao VTI 15.57 cm    IVS 0.74 0.6 - 1.1 cm    LA size 5.12 cm    Left Atrium Major Axis 8.36 cm    Left Atrium Minor Axis 7.43 cm    LVIDD 5.95 3.5 - 6.0 cm    LVIDS 4.76 (A) 2.1 - 4.0 cm    LVOT diameter 2.27 cm    LVOT peak VTI 9.42 cm    PW 0.89 0.6 - 1.1 cm    RA Major Axis 7.69 cm    RA Width 4.73 cm    RVDD 4.49 cm    Sinus 3.84 cm    TAPSE 2.10 cm    TR Max Archie 3.22 m/s    TDI LATERAL 0.09     TDI SEPTAL 0.08     LA WIDTH 4.37 cm    LV Diastolic Volume 176.67 mL    LV Systolic Volume 105.58 mL    RV S' 11.31 m/s    LVOT peak archie [id number] m/s    FS 20 %    LA volume 149.63 cm3    LV mass 187.70 g    Left Ventricle Relative Wall Thickness 0.30 cm    AV valve area 2.45 cm2    AV  Velocity Ratio 0.61     AV index (prosthetic) 0.61     Mean e' 0.09     LVOT area 4.05 cm2    LVOT stroke volume 38.10 cm3    AV peak gradient 3.76 mmHg    LV Systolic Volume Index 46.0 mL/m2    LV Diastolic Volume Index 76.94 mL/m2    LA Volume Index 65.2 mL/m2    LV Mass Index 81.7 g/m2    Triscuspid Valve Regurgitation Peak Gradient 41.47 mmHg    BSA 2.39 m2       Recent Labs   Lab 03/11/19  1219   POCTGLUCOSE 154*       Hemoglobin A1C   Date Value Ref Range Status   03/10/2019 6.2 (H) 4.0 - 5.6 % Final     Comment:     ADA Screening Guidelines:  5.7-6.4%  Consistent with prediabetes  >or=6.5%  Consistent with diabetes  High levels of fetal hemoglobin interfere with the HbA1C  assay. Heterozygous hemoglobin variants (HbS, HgC, etc)do  not significantly interfere with this assay.   However, presence of multiple variants may affect accuracy.     10/24/2018 6.7 (H) 4.0 - 5.6 % Final     Comment:     ADA Screening Guidelines:  5.7-6.4%  Consistent with prediabetes  >or=6.5%  Consistent with diabetes  High levels of fetal hemoglobin interfere with the HbA1C  assay. Heterozygous hemoglobin variants (HbS, HgC, etc)do  not significantly interfere with this assay.   However, presence of multiple variants may affect accuracy.     06/13/2018 7.4 (H) 4.0 - 5.6 % Final     Comment:     ADA Screening Guidelines:  5.7-6.4%  Consistent with prediabetes  >or=6.5%  Consistent with diabetes  High levels of fetal hemoglobin interfere with the HbA1C  assay. Heterozygous hemoglobin variants (HbS, HgC, etc)do  not significantly interfere with this assay.   However, presence of multiple variants may affect accuracy.          Active Hospital Problems    Diagnosis  POA    *Pneumonia of left lower lobe due to infectious organism [J18.1]  Yes    Skin ulcer [L98.499]  Yes    Diabetes mellitus type II, controlled [E11.9]  Yes    End stage renal failure on dialysis [N18.6, Z99.2]  Not Applicable     Chronic    Permanent atrial  fibrillation [I48.2]  Yes    Secondary adrenal insufficiency [E27.49]  Yes    Hemodialysis-associated hypotension [I95.3]  Yes    Immunosuppressed status [D89.9]  Yes    Chronic respiratory failure with hypoxia [J96.11]  Yes    Current use of long term anticoagulation [Z79.01]  Not Applicable    Anemia in ESRD (end-stage renal disease) [N18.6, D63.1]  Yes    Hyperlipidemia [E78.5]  Yes     Chronic    Pulmonary hypertension [I27.20]  Yes    Morbid obesity with BMI of 40.0-44.9, adult [E66.01, Z68.41]  Not Applicable     Chronic    Chronic combined systolic and diastolic heart failure [I50.42]  Yes     Chronic     EF 20% 2013, improved with Medical therapy, negative PET 2013      Pulmonary sarcoidosis [D86.0]  Yes     Chronic    Seizure disorder [G40.909]  Yes     Chronic      Resolved Hospital Problems   No resolved problems to display.         sodium chloride 0.9%   Intravenous Once    sodium chloride 0.9%   Intravenous Once    aspirin  81 mg Oral Daily    atorvastatin  80 mg Oral Daily    bacitracin-polymyxin b   Topical (Top) Daily    ciclopirox   Topical (Top) Daily    clopidogrel  75 mg Oral Daily    fludrocortisone  100 mcg Oral BID    ketorolac 0.5%  1 drop Both Eyes BID    levETIRAcetam  500 mg Oral BID    levoFLOXacin  500 mg Intravenous Q48H    midodrine  5 mg Oral TID WM    nortriptyline  25 mg Oral with dinner    pantoprazole  40 mg Oral Daily    polyethylene glycol  17 g Oral Daily    prednisoLONE acetate  1 drop Left Eye TID    predniSONE  20 mg Oral Daily    pregabalin  100 mg Oral TID    senna-docusate 8.6-50 mg  1 tablet Oral BID    sevelamer carbonate  800 mg Oral TID WM    warfarin  5 mg Oral Daily     acetaminophen, dextrose 50%, dextrose 50%, glucagon (human recombinant), glucose, glucose, insulin aspart U-100, ondansetron, promethazine, ramelteon, sodium chloride 0.9%, sodium chloride 0.9%, tiZANidine      Assessment and Plan for problems addressed today:   1.  HAP with symptomatic hypotension, likely secondary to infection versus adrenal insufficiency which is chronic  - given Levaquin in ED, will continue, add vanc to cover MRSA since ESRD  - check blood cultures x2, respiratory culture  - check rapid flu- negative  - tele monitoring  - lactic acid elevated, resolved  - continue home O2     2.  Dm 2, non-insulin dependent  - hold home meds  - ISS  - bedside glucose monitoring  - diabetic diet  - check A1c- 6.2     3.  Hyperlipidemia  - chronic and stable  - continue statin     4.  Pulmonary sarcoidosis  - continue steroids  - continue home oxygen     5.  Paroxysmal Afib on chronic anticoagulation  - tele monitoring  - continue Coumadin  - daily PT/INR  - appreciate pharmacist assistance with dosing     6.  ESRD on on HD, TThSat  - consult Nephrology, appreciate recs     7.  Chronic combined systolic and diastolic heart failure  - does not appear in acute exacerbation  - check 2D echo- in process  - on HD     8.  Pulmonary hypertension, chronic respiratory failure on home oxygen  - follows with pulmonology here  - continue home O2     9.  Secondary adrenal insufficiency  - continue Florinef, midodrine     10.  Right lower extremity wounds  - consult wound care     11.  Seizure disorder  - chronic and stable  - continue Keppra     12. Elevated troponin  - trend out, baseline is around 0.1- no acute elevation from baseline  - tele monitoring     13.  Morbid obesity  - counseled on diet and exercise     14. TIA  - continue aspirin, plavix        Diet: diabetic  DVT PPx: coumadin  Code status: FULL    Discharge plan and follow up:  Continue antibiotics for pneumonia, HD per nephro, blood pressure much improved, will see how she tolerates HD     Faye Crespo MD  Hospital Medicine Staff  [phone] pager

## 2019-03-11 NOTE — PROGRESS NOTES
Pt arrived w/c AAOX4, acute HD dialysis days TTS. RAISSA fistula +bruit/thrill accessed w/15g needles w/o difficulty. HD started @ 1630 UF Goal 1-2L of over 2hrs HD BP permitting

## 2019-03-12 PROBLEM — I48.0 PAROXYSMAL ATRIAL FIBRILLATION: Status: ACTIVE | Noted: 2017-06-12

## 2019-03-12 PROBLEM — J18.9 PNEUMONIA OF LEFT LOWER LOBE DUE TO INFECTIOUS ORGANISM: Status: RESOLVED | Noted: 2019-01-01 | Resolved: 2019-01-01

## 2019-03-12 PROBLEM — J18.9 COMMUNITY ACQUIRED PNEUMONIA OF LEFT LOWER LOBE OF LUNG: Status: ACTIVE | Noted: 2019-01-01

## 2019-03-12 NOTE — PROGRESS NOTES
Patient arrived to unit with standing weight obtained. Maintenance dialysis(TTS) began per orders via 15 gauge fistula needles to left upper arm graft.

## 2019-03-12 NOTE — HOSPITAL COURSE
Patient admitted on 3/10/19 with Pneumonia. In the ED, patient was found to have a left lower lobe pneumonia on CT scan.  Her lactic acid was also mildly elevated at 2.7.  Her BNP was elevated at 705.  Her troponin was also elevated at 0.173.  Patient was given a dose of Levaquin and admission was requested for pneumonia.  Patient receiving HD inpatient and required additional dialysis run for fluid removal.  With improved breathing Levaquin transitioned to oral. Patient with MRSA in lower extremity ulceration. Started on Vanc consulted ID. ID recommended continue Levaquin for total of 7 days along with doxy for 7 days for MRSA of leg wound. Nephrology felt that patient was euvolemic today and can transition back to outpatient HD chair. Patient instructed on discharge and to follow-up with PCP and wound care nurse.

## 2019-03-12 NOTE — PROGRESS NOTES
Patient called 03/12/19 to inform us that she has been admitted in ([facility]) as of (03/10/19) C/S.

## 2019-03-12 NOTE — PROGRESS NOTES
[facility] Medicine PharmD Consult: Vancomycin Follow-up Note    Sisi Medel is a 58 y.o. female initiated on antimicrobial therapy for bacteremia with Vancomycin 1250 mg IV every 24 hours.      Renal function: Estimated Creatinine Clearance: 9.5 mL/min (A) (based on SCr of 8.9 mg/dL (H)).   Patient on iHD TTS as outpatient, plans for HD today.    Vancomycin serum concentration assessment(s):   Vancomycin random this AM 17.2 mcg/mL. Goal to redose <20 mcg/mL.    Vancomycin regimen plan:   D/c'd scheduled vancomycin as patient is on HD and should be dosed after HD. Recommend patient receive vancomycin 1g post HD today. Vancomycin random on Thursday if this is her next scheduled HD day.     Pharmacy will continue to follow and monitor vancomycin.        PHARMACY CONSULT NOTE: WARFARIN  Sisi Medel is a 58 y.o. female on warfarin therapy for Atrial Fibrillation. PharmD has been consulted for warfarin dosing.    Current order: 5mg daily  Home dose: 5mg daily  Coumadin clinic enrollment: Active  INR goal: 2-3    Lab Results   Component Value Date    INR 2.0 (H) 03/12/2019    INR 2.1 (H) 03/11/2019    INR 2.5 (H) 03/10/2019     Significant drug interactions: levofloxacin  Diet: Adult Diabetic without supplements     Recommendation(s):    INR 2 today, restarted 5mg last night. Would give a 1x boost of 7.5mg since dose held for several days. .   INR daily (ordered).   Pharmacy will continue to follow and monitor warfarin    Thank you for the consult,  Ly Bright, PharmD, BCPS  [id number]    **Note: Consults are reviewed Monday-Friday 7:00am-3:30pm. THE ABOVE RECOMMENDATIONS ARE ONLY SUGGESTED.THE RECOMMENDATIONS SHOULD BE CONSIDERED IN CONJUNCTION WITH ALL PATIENT FACTORS. **

## 2019-03-12 NOTE — PROGRESS NOTES
[facility] Hospital Medicine  Progress Note    Patient Name: Sisi Medel  MRN: [id number]  Patient Class: IP- Inpatient   Admission Date: 3/10/2019  Length of Stay: 2 days  Attending Physician: BOB Thompson MD  Primary Care Provider: Carlos A Caputo MD    [facility] Medicine Team: [facility]P MED O Aleyda Linn NP    Subjective:     Principal Problem:Healthcare-associated pneumonia    HPI:  Sisi Medel is a 58 y.o. female with hx of NIDDM, ESRD on hemodialysis, pulmonary sarcoidosis, seizure disorder, PAF, hyperlipidemia, chronic combined systolic and diastolic heart failure, pulmonary hypertension and severe obesity who presents with shortness of breath for the last 2 weeks.  Patient notes hypotension for the last 2 weeks associated with dialysis.  She does have a history of chronic hypotension for which she takes midodrine but notes this is worse than her normal.  Patient also notes worsening shortness of breath especially on exertion.  She chronically wears oxygen about 3-5 L as needed for her pulmonary hypertension.  Patient notes becoming more short of breath as well as a dry cough.  She also notes some chills.  She denies any fevers.  She denies any sick contacts although she is a dialysis patient.  Patient denies any vomiting but has had some nausea.  She also notes some lower abdominal pain and constipation.  Patient denies any diarrhea.  She does not make any urine.  Patient notes dizziness, lightheadedness and near-syncope associated with her low blood pressure.  She notes she almost fainted yesterday.  Patient notes they were unable to complete dialysis yesterday due to dizziness and hypotension.    Hospital Course:  Patient admitted on 3/10/19 with Pneumonia. In the ED, patient was found to have a left lower lobe pneumonia on CT scan.  Her lactic acid was also mildly elevated at 2.7.  Her BNP was elevated at 705.  Her troponin was also elevated at 0.173.   Patient was given a dose of Levaquin and admission was requested for pneumonia.  Patient receiving HD inpatient and required additional dialysis run for fluid removal.  With improved breathing Levaquin transitioned to oral.     Interval History: Patient seen at bedside following dialysis. Patient sitting on side of bed comfortable with O2 on. Patient reports improvement in breathing. +cough. Discussed plan to transition to oral Levaquin. Continue to monitor respiratory status.     Review of Systems   Constitutional: Negative for appetite change, chills, fatigue and fever.   HENT: Negative for trouble swallowing.    Respiratory: Positive for cough. Negative for chest tightness, shortness of breath and wheezing.    Cardiovascular: Negative for chest pain, palpitations and leg swelling.   Gastrointestinal: Negative for abdominal pain, constipation, diarrhea and nausea.   Genitourinary: Negative for difficulty urinating, frequency and urgency.        + anuric   Musculoskeletal: Negative for arthralgias and myalgias.   Skin: Negative for rash.        +RLE wound   Neurological: Negative for dizziness, weakness, light-headedness and headaches.   Psychiatric/Behavioral: Negative for sleep disturbance.     Scheduled Meds:   aspirin  81 mg Oral Daily    atorvastatin  80 mg Oral Daily    bacitracin-polymyxin b   Topical (Top) Daily    ciclopirox   Topical (Top) Daily    clopidogrel  75 mg Oral Daily    fludrocortisone  100 mcg Oral BID    ketorolac 0.5%  1 drop Both Eyes BID    levETIRAcetam  500 mg Oral BID    [START ON 3/13/2019] levoFLOXacin  500 mg Oral Q48H    midodrine  5 mg Oral TID WM    nortriptyline  25 mg Oral with dinner    pantoprazole  40 mg Oral Daily    polyethylene glycol  17 g Oral Daily    prednisoLONE acetate  1 drop Left Eye TID    predniSONE  20 mg Oral Daily    pregabalin  100 mg Oral TID    senna-docusate 8.6-50 mg  1 tablet Oral BID    sevelamer carbonate  800 mg Oral TID WM     warfarin  5 mg Oral Daily     Continuous Infusions:  PRN Meds:.acetaminophen, benzonatate, bisacodyl, dextrose 50%, dextrose 50%, glucagon (human recombinant), glucose, glucose, insulin aspart U-100, midodrine, ondansetron, promethazine, ramelteon, sodium chloride 0.9%, sodium chloride 0.9%, tiZANidine    Objective:     Vital Signs (Most Recent):  Temp: 97.3 °F (36.3 °C) (03/12/19 1328)  Pulse: 71 (03/12/19 1328)  Resp: 18 (03/12/19 1328)  BP: 96/62 (03/12/19 1328)  SpO2: 100 % (03/12/19 1328) Vital Signs (24h Range):  Temp:  [97.2 °F (36.2 °C)-97.8 °F (36.6 °C)] 97.3 °F (36.3 °C)  Pulse:  [44-76] 71  Resp:  [13-20] 18  SpO2:  [95 %-100 %] 100 %  BP: ()/(51-89) 96/62     Weight: 119.3 kg (263 lb)  Body mass index is 39.99 kg/m².    Intake/Output Summary (Last 24 hours) at 3/12/2019 1459  Last data filed at 3/11/2019 1830  Gross per 24 hour   Intake 0 ml   Output 2000 ml   Net -2000 ml      Physical Exam   Constitutional: She is oriented to person, place, and time. She appears well-developed and well-nourished. No distress.   Obese   Cardiovascular: Normal rate, regular rhythm and normal heart sounds. Exam reveals no gallop and no friction rub.   No murmur heard.  + left chest pacemaker   Pulmonary/Chest: Effort normal and breath sounds normal. No respiratory distress. She has no wheezes. She has no rales.   +O2 use via NC   Abdominal: Soft. Bowel sounds are normal. She exhibits no distension. There is no tenderness.   Musculoskeletal: Normal range of motion. She exhibits edema. She exhibits no tenderness.   1+ pedal edema; RLE dressing in place c/d/i   Neurological: She is alert and oriented to person, place, and time.   Skin: Skin is warm and dry. No rash noted. She is not diaphoretic. No cyanosis. Nails show no clubbing.   Psychiatric: She has a normal mood and affect. Her behavior is normal.       Significant Labs:   BMP:   Recent Labs   Lab 03/12/19  0400      *   K 4.9   CL 93*   CO2 19*    BUN 68*   CREATININE 8.9*   CALCIUM 7.4*     CBC:   Recent Labs   Lab 03/11/19  0827 03/12/19  0400   WBC 10.55 7.97   HGB 11.9* 11.7*   HCT 38.2 37.0   * 157     Magnesium: No results for input(s): MG in the last 48 hours.    Significant Imaging: I have reviewed all pertinent imaging results/findings within the past 24 hours.    Assessment/Plan:      * Healthcare-associated pneumonia    - given Levaquin in ED, will continue--transitioned to oral with improvement in breathing  - check blood cultures x2, respiratory culture  - check rapid flu -negative  - tele monitoring  - lactic acid elevated, resolved  - continue home O2     Skin ulcer    - wound care consulted  - continue current recommendations     Diabetes mellitus type II, controlled    - hold home meds  - ISS  - bedside glucose monitoring  - diabetic diet  - check A1c- 6.2     End stage renal failure on dialysis    - consult Nephrology, appreciate recs     Paroxysmal atrial fibrillation    - tele monitoring  - continue Coumadin  - daily PT/INR  - appreciate pharmacist assistance with dosing     Secondary adrenal insufficiency    - Chronic  - continue Florinef, midodrine  - Midodrine 15mg 30min prior to dialysis     Chronic respiratory failure with hypoxia    - due to pulmonary sarcoidosis  - continue supplemental oxygen     Chronic combined systolic and diastolic heart failure    - does not appear in acute exacerbation  - check 2D echo- in process  - on HD     Hyperlipidemia    - chronic and stable  - continue statin     Pulmonary hypertension    - follows with pulmonology here  - continue home O2     Morbid obesity with BMI of 40.0-44.9, adult    - counseled on diet and exercise     Seizure disorder    - chronic and stable  - continue Keppra     Pulmonary sarcoidosis    - continue steroids  - continue home oxygen       VTE Risk Mitigation (From admission, onward)        Ordered     warfarin (COUMADIN) tablet 5 mg  Daily      03/10/19 8095     IP VTE  HIGH RISK PATIENT  Once      03/10/19 1735     Place sequential compression device  Until discontinued      03/10/19 1735     Reason for No Pharmacological VTE Prophylaxis  Once      03/10/19 1735          Aleyda Linn NP  Department of Hospital Medicine   [facility]

## 2019-03-12 NOTE — SUBJECTIVE & OBJECTIVE
Interval History: Patient seen at bedside following dialysis. Patient sitting on side of bed comfortable with O2 on. Patient reports improvement in breathing. +cough. Discussed plan to transition to oral Levaquin. Continue to monitor respiratory status.     Review of Systems   Constitutional: Negative for appetite change, chills, fatigue and fever.   HENT: Negative for trouble swallowing.    Respiratory: Positive for cough. Negative for chest tightness, shortness of breath and wheezing.    Cardiovascular: Negative for chest pain, palpitations and leg swelling.   Gastrointestinal: Negative for abdominal pain, constipation, diarrhea and nausea.   Genitourinary: Negative for difficulty urinating, frequency and urgency.        + anuric   Musculoskeletal: Negative for arthralgias and myalgias.   Skin: Negative for rash.        +RLE wound   Neurological: Negative for dizziness, weakness, light-headedness and headaches.   Psychiatric/Behavioral: Negative for sleep disturbance.     Scheduled Meds:   aspirin  81 mg Oral Daily    atorvastatin  80 mg Oral Daily    bacitracin-polymyxin b   Topical (Top) Daily    ciclopirox   Topical (Top) Daily    clopidogrel  75 mg Oral Daily    fludrocortisone  100 mcg Oral BID    ketorolac 0.5%  1 drop Both Eyes BID    levETIRAcetam  500 mg Oral BID    [START ON 3/13/2019] levoFLOXacin  500 mg Oral Q48H    midodrine  5 mg Oral TID WM    nortriptyline  25 mg Oral with dinner    pantoprazole  40 mg Oral Daily    polyethylene glycol  17 g Oral Daily    prednisoLONE acetate  1 drop Left Eye TID    predniSONE  20 mg Oral Daily    pregabalin  100 mg Oral TID    senna-docusate 8.6-50 mg  1 tablet Oral BID    sevelamer carbonate  800 mg Oral TID WM    warfarin  5 mg Oral Daily     Continuous Infusions:  PRN Meds:.acetaminophen, benzonatate, bisacodyl, dextrose 50%, dextrose 50%, glucagon (human recombinant), glucose, glucose, insulin aspart U-100, midodrine, ondansetron,  promethazine, ramelteon, sodium chloride 0.9%, sodium chloride 0.9%, tiZANidine    Objective:     Vital Signs (Most Recent):  Temp: 97.3 °F (36.3 °C) (03/12/19 1328)  Pulse: 71 (03/12/19 1328)  Resp: 18 (03/12/19 1328)  BP: 96/62 (03/12/19 1328)  SpO2: 100 % (03/12/19 1328) Vital Signs (24h Range):  Temp:  [97.2 °F (36.2 °C)-97.8 °F (36.6 °C)] 97.3 °F (36.3 °C)  Pulse:  [44-76] 71  Resp:  [13-20] 18  SpO2:  [95 %-100 %] 100 %  BP: ()/(51-89) 96/62     Weight: 119.3 kg (263 lb)  Body mass index is 39.99 kg/m².    Intake/Output Summary (Last 24 hours) at 3/12/2019 1459  Last data filed at 3/11/2019 1830  Gross per 24 hour   Intake 0 ml   Output 2000 ml   Net -2000 ml      Physical Exam   Constitutional: She is oriented to person, place, and time. She appears well-developed and well-nourished. No distress.   Obese   Cardiovascular: Normal rate, regular rhythm and normal heart sounds. Exam reveals no gallop and no friction rub.   No murmur heard.  + left chest pacemaker   Pulmonary/Chest: Effort normal and breath sounds normal. No respiratory distress. She has no wheezes. She has no rales.   +O2 use via NC   Abdominal: Soft. Bowel sounds are normal. She exhibits no distension. There is no tenderness.   Musculoskeletal: Normal range of motion. She exhibits edema. She exhibits no tenderness.   1+ pedal edema; RLE dressing in place c/d/i   Neurological: She is alert and oriented to person, place, and time.   Skin: Skin is warm and dry. No rash noted. She is not diaphoretic. No cyanosis. Nails show no clubbing.   Psychiatric: She has a normal mood and affect. Her behavior is normal.       Significant Labs:   BMP:   Recent Labs   Lab 03/12/19  0400      *   K 4.9   CL 93*   CO2 19*   BUN 68*   CREATININE 8.9*   CALCIUM 7.4*     CBC:   Recent Labs   Lab 03/11/19  0827 03/12/19  0400   WBC 10.55 7.97   HGB 11.9* 11.7*   HCT 38.2 37.0   * 157     Magnesium: No results for input(s): MG in the last 48  hours.    Significant Imaging: I have reviewed all pertinent imaging results/findings within the past 24 hours.

## 2019-03-12 NOTE — HPI
Sisi Medel is a 58 y.o. female with hx of NIDDM, ESRD on hemodialysis, pulmonary sarcoidosis, seizure disorder, PAF, hyperlipidemia, chronic combined systolic and diastolic heart failure, pulmonary hypertension and severe obesity who presents with shortness of breath for the last 2 weeks.  Patient notes hypotension for the last 2 weeks associated with dialysis.  She does have a history of chronic hypotension for which she takes midodrine but notes this is worse than her normal.  Patient also notes worsening shortness of breath especially on exertion.  She chronically wears oxygen about 3-5 L as needed for her pulmonary hypertension.  Patient notes becoming more short of breath as well as a dry cough.  She also notes some chills.  She denies any fevers.  She denies any sick contacts although she is a dialysis patient.  Patient denies any vomiting but has had some nausea.  She also notes some lower abdominal pain and constipation.  Patient denies any diarrhea.  She does not make any urine.  Patient notes dizziness, lightheadedness and near-syncope associated with her low blood pressure.  She notes she almost fainted yesterday.  Patient notes they were unable to complete dialysis yesterday due to dizziness and hypotension.

## 2019-03-12 NOTE — PROGRESS NOTES
[facility] H[facility]IAL[facility] NOTE    Sisi Medel is a 58 y.o. female was seen on hemodialysis for removal of uremic toxins and volume.    Labs were reviewed and the dialysate bath was adjusted.    There were no symptoms of hypotension, chest pain, dyspnea, nausea or vomiting.    Exam  Dialysis flow sheet and vitals reviewed  Edema ++  Alert and oriented x 3  Access LUE     Labs:      Recent Labs   Lab 03/10/19  1130 03/11/19  0827 03/12/19  0400    133* 128*   K 3.9 4.3 4.9   CL 99 97 93*   CO2 23 21* 19*   BUN 38* 53* 68*   CREATININE 6.3* 7.9* 8.9*   CALCIUM 7.8* 7.3* 7.4*   PHOS  --   --  4.6*       Recent Labs   Lab 03/10/19  1130 03/11/19  0827 03/12/19  0400   WBC 9.08 10.55 7.97   HGB 13.7 11.9* 11.7*   HCT 46.1 38.2 37.0    147* 157       Assessment/Plan:    ESRD  Pulmonary hypertension  Chronic hypotension  Intra dialytic hypotension as outpatient  Fluid overload  Hyperphosphatemia  Hyponatremia  Anemia of chronic illness    HD today for removal of uremic toxins and volume. Dialysate bath was adjusted per labs. UF goal adjusted per her fluid gains. She needs strict I/O monitoring every 4-6 hours and daily standing weights.     Please consider inpatient consult to cardiology service given her worsened PA pressures and outpatient dialysis clinic having problems with recurrent hypotension affecting her ability for net UF.    Pt needs aggressive UF to keep her on dryer side.     Continue midodrine with HD.    Continue phos binder with meals and strict low phos diet.    Hb above goal for JINA use today.     Continue to trend labs, I/O.

## 2019-03-12 NOTE — PROGRESS NOTES
Dialysis completed. Needles removed from left upper arm graft with pressure held to sites for 10 minutes with hemostasis achieved. Gauze and tape to sites. Patient dialyzed for 4 hours with fluid removal of 3 liters. Tolerated well with stable vital signs. Report called to DEXTER Ivey.

## 2019-03-12 NOTE — ASSESSMENT & PLAN NOTE
- given Levaquin in ED, will continue--transitioned to oral with improvement in breathing  - check blood cultures x2, respiratory culture  - check rapid flu -negative  - tele monitoring  - lactic acid elevated, resolved  - continue home O2

## 2019-03-12 NOTE — PROGRESS NOTES
HD completed and blood returned RAISSA +bruit/thrill pressure held until hemostasis achieved. 1.5L removed over 2hs. Report called to nurse

## 2019-03-12 NOTE — PLAN OF CARE
CM met with patient to complete discharge assessment and planning. Patient currently lives with SO, Osbaldo Crowley ( [phone]). Patient is oxygen dependent and receives O2 supplies from [employer]. Patient reports Mr. Crowley will provide transportation home and bring portable O2 to bedside prior to discharge to home. Patient is an established patient with [facility] and [facility], would like to resume services. CM and SW will continue to follow for any additional discharge needs.    PCP: Carlos A Caputo MD    Pharmacy:   [facility] [address] [place], [place] - [address] [facility] [address] [employer] [address] [facility] [address]  [address] [email or web address] [facility] [address]  Phone: [phone] Fax: [phone]    Payor: [employer] MEDICARE / Plan: [employer] HEALTH / Product Type: Medicare Advantage /        03/12/19 1646   Discharge Assessment   Assessment Type Discharge Planning Assessment   Confirmed/corrected address and phone number on facesheet? Yes   Assessment information obtained from? Patient   Communicated expected length of stay with patient/caregiver yes   Prior to hospitilization cognitive status: Alert/Oriented   Prior to hospitalization functional status: Independent;Assistive Equipment   Current cognitive status: Alert/Oriented   Current Functional Status: Independent;Assistive Equipment   Lives With significant other   Able to Return to Prior Arrangements yes   Is patient able to care for self after discharge? Yes   Patient's perception of discharge disposition home or selfcare   Readmission Within the Last 30 Days no previous admission in last 30 days   Patient currently being followed by outpatient case management? No   Patient currently receives any other outside agency services? No   Equipment Currently Used at Home walker, rolling;wheelchair;oxygen;bedside commode   Do you have any problems affording any of your prescribed medications?  TBD   Is the patient taking medications as prescribed? yes   Does the patient have transportation home? Yes   Transportation Anticipated family or friend will provide   Dialysis Name and Scheduled days [facility],  [facility], 6 am   Does the patient receive services at the Coumadin Clinic? Yes  ([facility])   Discharge Plan A Home;Home with family   Discharge Plan B Home;Home with family;Home Health   DME Needed Upon Discharge  shower chair   Patient/Family in Agreement with Plan yes

## 2019-03-12 NOTE — ASSESSMENT & PLAN NOTE
- tele monitoring  - continue Coumadin  - daily PT/INR  - appreciate pharmacist assistance with dosing

## 2019-03-12 NOTE — PLAN OF CARE
Problem: Adult Inpatient Plan of Care  Goal: Plan of Care Review  Outcome: Ongoing (interventions implemented as appropriate)  Pt in room with no s/s of distress. Pt wound care done this am. Pt anuric and had no bm overnight. Pt slept well. Antibiotics given as ordered after pt returned from HD yesterday. Pt given tessalon anastacio for dry cough with no secretions for specimen per patient. Will continue to monitor.

## 2019-03-12 NOTE — PLAN OF CARE
Problem: Adult Inpatient Plan of Care  Goal: Plan of Care Review  Outcome: Ongoing (interventions implemented as appropriate)   03/11/19 1946   Plan of Care Review   Plan of Care Reviewed With patient     Pt free of falls/trauma/injuries. Bed in low position, wheels locked, and call light within reach. Skin integrity remains unchanged; wound care provided per wound care nurse. IV antibiotic given per orders. VSS and afebrile. No distress noted/reported at this time. Will continue to monitor.

## 2019-03-13 NOTE — PROGRESS NOTES
Patient arrived on unit via stretcher. Standing weight obtained @ 121.4kg. Dialysis inititaed via left arm AVG with 15g needles x 2. Orders and machine settings verified. Tx started. VSS. 400 BFR established.    Hemodialysis.   TTS patient ran on Wednesday.

## 2019-03-13 NOTE — ASSESSMENT & PLAN NOTE
- does not appear in acute exacerbation  - check 2D echo- PA is 56 d/w HTS likely related to increase volume, continue to maximize BP control with midodrine for dialysis, suspected for PA to return to baseline once fluid is removed. Patient to f/u in clinic  - on HD

## 2019-03-13 NOTE — ASSESSMENT & PLAN NOTE
- wound care consulted  - continue current recommendations  - cx + MRSA  - started on vanc after dialysis  - consulted ID

## 2019-03-13 NOTE — PROGRESS NOTES
HEMODIALYSIS NOTE  Patient evaluated while undergoing hemodialysis indicated for ESRD. Tolerating session with current UFR, no complications.  to AVG. UF goal 3L as tolerated.    -Bps stable on HD  -on scheduled midodrine 5 mg  -also with PRN midodrine 15 mg before HD  -hold epogen  -will plan for HD tomorrow  -Ph  -daily CBC, renal function panel  -IDWG 3kg since last HD yesterday  -start renal diet  -800ml FR/dy  -strict I/Os  -daily wts  -will plan for HD tomorrow    -per HD RN, received call from floor nurse regarding contact isolation; discussed with Hospital Medicine; blood cx 3/8 +MRSA

## 2019-03-13 NOTE — PROGRESS NOTES
[facility] Hospital Medicine  Progress Note    Patient Name: Sisi Medel  MRN: [id number]  Patient Class: IP- Inpatient   Admission Date: 3/10/2019  Length of Stay: 3 days  Attending Physician: BOB Thompson MD  Primary Care Provider: Carlos A Caputo MD    [facility] Medicine Team: [facility] HOSP MED O Aleyda Linn NP    Subjective:     Principal Problem:Healthcare-associated pneumonia    HPI:  Sisi Medel is a 58 y.o. female with hx of NIDDM, ESRD on hemodialysis, pulmonary sarcoidosis, seizure disorder, PAF, hyperlipidemia, chronic combined systolic and diastolic heart failure, pulmonary hypertension and severe obesity who presents with shortness of breath for the last 2 weeks.  Patient notes hypotension for the last 2 weeks associated with dialysis.  She does have a history of chronic hypotension for which she takes midodrine but notes this is worse than her normal.  Patient also notes worsening shortness of breath especially on exertion.  She chronically wears oxygen about 3-5 L as needed for her pulmonary hypertension.  Patient notes becoming more short of breath as well as a dry cough.  She also notes some chills.  She denies any fevers.  She denies any sick contacts although she is a dialysis patient.  Patient denies any vomiting but has had some nausea.  She also notes some lower abdominal pain and constipation.  Patient denies any diarrhea.  She does not make any urine.  Patient notes dizziness, lightheadedness and near-syncope associated with her low blood pressure.  She notes she almost fainted yesterday.  Patient notes they were unable to complete dialysis yesterday due to dizziness and hypotension.    Hospital Course:  Patient admitted on 3/10/19 with Pneumonia. In the ED, patient was found to have a left lower lobe pneumonia on CT scan.  Her lactic acid was also mildly elevated at 2.7.  Her BNP was elevated at 705.  Her troponin was also elevated at 0.173.   Patient was given a dose of Levaquin and admission was requested for pneumonia.  Patient receiving HD inpatient and required additional dialysis run for fluid removal.  With improved breathing Levaquin transitioned to oral. Patient with MRSA in lower extremity ulceration. Started on Vanc consulted ID.    Interval History: Patient seen at bedside following dialysis. Patient sitting on side of bed comfortable with O2 on. Patient reports improvement in breathing but not at baseline. Will have dialysis tomorrow. Cx of lower extremity wound + MRSA started on Vanc consulted ID.     Review of Systems   Constitutional: Negative for appetite change, chills, fatigue and fever.   HENT: Negative for trouble swallowing.    Respiratory: Positive for cough. Negative for chest tightness, shortness of breath and wheezing.    Cardiovascular: Negative for chest pain, palpitations and leg swelling.   Gastrointestinal: Negative for abdominal pain, constipation, diarrhea and nausea.   Genitourinary: Negative for difficulty urinating, frequency and urgency.        + anuric   Musculoskeletal: Negative for arthralgias and myalgias.   Skin: Negative for rash.        +RLE wound   Neurological: Negative for dizziness, weakness, light-headedness and headaches.   Psychiatric/Behavioral: Negative for sleep disturbance.     Scheduled Meds:   sodium chloride 0.9%   Intravenous Once    aspirin  81 mg Oral Daily    atorvastatin  80 mg Oral Daily    bacitracin-polymyxin b   Topical (Top) Daily    ciclopirox   Topical (Top) Daily    clopidogrel  75 mg Oral Daily    fludrocortisone  100 mcg Oral BID    ketorolac 0.5%  1 drop Both Eyes BID    levETIRAcetam  500 mg Oral BID    levoFLOXacin  500 mg Oral Q48H    midodrine  5 mg Oral TID WM    nortriptyline  25 mg Oral with dinner    pantoprazole  40 mg Oral Daily    polyethylene glycol  17 g Oral Daily    prednisoLONE acetate  1 drop Left Eye TID    predniSONE  20 mg Oral Daily     pregabalin  100 mg Oral TID    senna-docusate 8.6-50 mg  1 tablet Oral BID    sevelamer carbonate  800 mg Oral TID WM    [START ON 3/14/2019] vancomycin (VANCOCIN) IVPB  1,000 mg Intravenous Every Tues, Thurs, Sat    [START ON 3/14/2019] warfarin  5 mg Oral Daily    warfarin  7.5 mg Oral Once     Continuous Infusions:  PRN Meds:.acetaminophen, benzonatate, bisacodyl, dextrose 50%, dextrose 50%, glucagon (human recombinant), glucose, glucose, insulin aspart U-100, midodrine, ondansetron, promethazine, ramelteon, sodium chloride 0.9%, sodium chloride 0.9%, tiZANidine    Objective:     Vital Signs (Most Recent):  Temp: 98.1 °F (36.7 °C) (03/13/19 1100)  Pulse: 69 (03/13/19 1515)  Resp: 18 (03/13/19 1100)  BP: 118/65 (03/13/19 1515)  SpO2: (!) 94 % (03/13/19 1100) Vital Signs (24h Range):  Temp:  [97.3 °F (36.3 °C)-98.7 °F (37.1 °C)] 98.1 °F (36.7 °C)  Pulse:  [60-74] 69  Resp:  [12-18] 18  SpO2:  [94 %-98 %] 94 %  BP: ()/(60-84) 118/65     Weight: 119.3 kg (263 lb)  Body mass index is 39.99 kg/m².  No intake or output data in the 24 hours ending 03/13/19 1544   Physical Exam   Constitutional: She is oriented to person, place, and time. She appears well-developed and well-nourished. No distress.   Obese   Cardiovascular: Normal rate, regular rhythm and normal heart sounds. Exam reveals no gallop and no friction rub.   No murmur heard.  + left chest pacemaker   Pulmonary/Chest: Effort normal and breath sounds normal. No respiratory distress. She has no wheezes. She has no rales.   +O2 use via NC   Abdominal: Soft. Bowel sounds are normal. She exhibits no distension. There is no tenderness.   Musculoskeletal: Normal range of motion. She exhibits edema. She exhibits no tenderness.   1+ pedal edema; RLE dressing in place c/d/i   Neurological: She is alert and oriented to person, place, and time.   Skin: Skin is warm and dry. No rash noted. She is not diaphoretic. No cyanosis. Nails show no clubbing.    Psychiatric: She has a normal mood and affect. Her behavior is normal.       Significant Labs:   BMP:   Recent Labs   Lab 03/13/19  0655   GLU 68*   *   K 5.4*   CL 94*   CO2 18*   BUN 39*   CREATININE 6.7*   CALCIUM 7.7*     CBC:   Recent Labs   Lab 03/12/19  0400 03/13/19  0651   WBC 7.97 10.18   HGB 11.7* 12.4   HCT 37.0 40.1    193     POCT Glucose   Date Value Ref Range Status   03/13/2019 157 (H) 70 - 110 mg/dL Final   03/13/2019 90 70 - 110 mg/dL Final   03/12/2019 324 (H) 70 - 110 mg/dL Final   03/12/2019 104 70 - 110 mg/dL Final   03/12/2019 100 70 - 110 mg/dL Final   03/11/2019 227 (H) 70 - 110 mg/dL Final   03/11/2019 155 (H) 70 - 110 mg/dL Final   03/11/2019 154 (H) 70 - 110 mg/dL Final       Magnesium: No results for input(s): MG in the last 48 hours.    Significant Imaging: I have reviewed all pertinent imaging results/findings within the past 24 hours.    Assessment/Plan:      * Healthcare-associated pneumonia    - given Levaquin in ED, will continue--transitioned to oral with improvement in breathing  - check blood cultures x2, respiratory culture  - check rapid flu -negative  - tele monitoring  - lactic acid elevated, resolved  - continue home O2     Skin ulcer    - wound care consulted  - continue current recommendations  - cx + MRSA  - started on vanc after dialysis  - consulted ID     Diabetes mellitus type II, controlled    - hold home meds  - ISS  - bedside glucose monitoring  - diabetic diet  - check A1c- 6.2     End stage renal failure on dialysis    - consult Nephrology, appreciate recs     Paroxysmal atrial fibrillation    - tele monitoring  - continue Coumadin  - daily PT/INR  - appreciate pharmacist assistance with dosing  - subtherapeutic today boost given will monitor     Secondary adrenal insufficiency    - Chronic  - continue Florinef midodrine  - Midodrine 15mg 30min prior to dialysis     Chronic respiratory failure with hypoxia    - due to pulmonary sarcoidosis  -  continue supplemental oxygen     Chronic combined systolic and diastolic heart failure    - does not appear in acute exacerbation  - check 2D echo- PA is 56 d/w HTS likely related to increase volume, continue to maximize BP control with midodrine for dialysis, suspected for PA to return to baseline once fluid is removed. Patient to f/u in clinic  - on HD     Hyperlipidemia    - chronic and stable  - continue statin     Pulmonary hypertension    - follows with pulmonology here  - continue home O2     Morbid obesity with BMI of 40.0-44.9, adult    - counseled on diet and exercise     Seizure disorder    - chronic and stable  - continue Keppra     Pulmonary sarcoidosis    - continue steroids  - continue home oxygen       VTE Risk Mitigation (From admission, onward)        Ordered     warfarin (COUMADIN) tablet 5 mg  Daily      03/13/19 1408     warfarin (COUMADIN) tablet 7.5 mg  Once      03/13/19 1408     IP VTE HIGH RISK PATIENT  Once      03/10/19 1735     Place sequential compression device  Until discontinued      03/10/19 1735     Reason for No Pharmacological VTE Prophylaxis  Once      03/10/19 1735            Aleyda Linn NP  Department of Hospital Medicine   [facility]

## 2019-03-13 NOTE — ASSESSMENT & PLAN NOTE
- tele monitoring  - continue Coumadin  - daily PT/INR  - appreciate pharmacist assistance with dosing  - subtherapeutic today boost given will monitor

## 2019-03-13 NOTE — PLAN OF CARE
Problem: Adult Inpatient Plan of Care  Goal: Plan of Care Review  Outcome: Ongoing (interventions implemented as appropriate)  Pt in room with no s/s of distress. Pt VSS. Bed in low position with wheels locked and call bell in reach. Pt wound care done this am. Monitoring.

## 2019-03-13 NOTE — SUBJECTIVE & OBJECTIVE
Interval History: Patient seen at bedside following dialysis. Patient sitting on side of bed comfortable with O2 on. Patient reports improvement in breathing but not at baseline. Will have dialysis tomorrow. Cx of lower extremity wound + MRSA started on Vanc consulted ID.     Review of Systems   Constitutional: Negative for appetite change, chills, fatigue and fever.   HENT: Negative for trouble swallowing.    Respiratory: Positive for cough. Negative for chest tightness, shortness of breath and wheezing.    Cardiovascular: Negative for chest pain, palpitations and leg swelling.   Gastrointestinal: Negative for abdominal pain, constipation, diarrhea and nausea.   Genitourinary: Negative for difficulty urinating, frequency and urgency.        + anuric   Musculoskeletal: Negative for arthralgias and myalgias.   Skin: Negative for rash.        +RLE wound   Neurological: Negative for dizziness, weakness, light-headedness and headaches.   Psychiatric/Behavioral: Negative for sleep disturbance.     Scheduled Meds:   sodium chloride 0.9%   Intravenous Once    aspirin  81 mg Oral Daily    atorvastatin  80 mg Oral Daily    bacitracin-polymyxin b   Topical (Top) Daily    ciclopirox   Topical (Top) Daily    clopidogrel  75 mg Oral Daily    fludrocortisone  100 mcg Oral BID    ketorolac 0.5%  1 drop Both Eyes BID    levETIRAcetam  500 mg Oral BID    levoFLOXacin  500 mg Oral Q48H    midodrine  5 mg Oral TID WM    nortriptyline  25 mg Oral with dinner    pantoprazole  40 mg Oral Daily    polyethylene glycol  17 g Oral Daily    prednisoLONE acetate  1 drop Left Eye TID    predniSONE  20 mg Oral Daily    pregabalin  100 mg Oral TID    senna-docusate 8.6-50 mg  1 tablet Oral BID    sevelamer carbonate  800 mg Oral TID WM    [START ON 3/14/2019] vancomycin (VANCOCIN) IVPB  1,000 mg Intravenous Every Tues, Thurs, Sat    [START ON 3/14/2019] warfarin  5 mg Oral Daily    warfarin  7.5 mg Oral Once     Continuous  Infusions:  PRN Meds:.acetaminophen, benzonatate, bisacodyl, dextrose 50%, dextrose 50%, glucagon (human recombinant), glucose, glucose, insulin aspart U-100, midodrine, ondansetron, promethazine, ramelteon, sodium chloride 0.9%, sodium chloride 0.9%, tiZANidine    Objective:     Vital Signs (Most Recent):  Temp: 98.1 °F (36.7 °C) (03/13/19 1100)  Pulse: 69 (03/13/19 1515)  Resp: 18 (03/13/19 1100)  BP: 118/65 (03/13/19 1515)  SpO2: (!) 94 % (03/13/19 1100) Vital Signs (24h Range):  Temp:  [97.3 °F (36.3 °C)-98.7 °F (37.1 °C)] 98.1 °F (36.7 °C)  Pulse:  [60-74] 69  Resp:  [12-18] 18  SpO2:  [94 %-98 %] 94 %  BP: ()/(60-84) 118/65     Weight: 119.3 kg (263 lb)  Body mass index is 39.99 kg/m².  No intake or output data in the 24 hours ending 03/13/19 1544   Physical Exam   Constitutional: She is oriented to person, place, and time. She appears well-developed and well-nourished. No distress.   Obese   Cardiovascular: Normal rate, regular rhythm and normal heart sounds. Exam reveals no gallop and no friction rub.   No murmur heard.  + left chest pacemaker   Pulmonary/Chest: Effort normal and breath sounds normal. No respiratory distress. She has no wheezes. She has no rales.   +O2 use via NC   Abdominal: Soft. Bowel sounds are normal. She exhibits no distension. There is no tenderness.   Musculoskeletal: Normal range of motion. She exhibits edema. She exhibits no tenderness.   1+ pedal edema; RLE dressing in place c/d/i   Neurological: She is alert and oriented to person, place, and time.   Skin: Skin is warm and dry. No rash noted. She is not diaphoretic. No cyanosis. Nails show no clubbing.   Psychiatric: She has a normal mood and affect. Her behavior is normal.       Significant Labs:   BMP:   Recent Labs   Lab 03/13/19  0655   GLU 68*   *   K 5.4*   CL 94*   CO2 18*   BUN 39*   CREATININE 6.7*   CALCIUM 7.7*     CBC:   Recent Labs   Lab 03/12/19  0400 03/13/19  0651   WBC 7.97 10.18   HGB 11.7* 12.4    HCT 37.0 40.1    193     POCT Glucose   Date Value Ref Range Status   03/13/2019 157 (H) 70 - 110 mg/dL Final   03/13/2019 90 70 - 110 mg/dL Final   03/12/2019 324 (H) 70 - 110 mg/dL Final   03/12/2019 104 70 - 110 mg/dL Final   03/12/2019 100 70 - 110 mg/dL Final   03/11/2019 227 (H) 70 - 110 mg/dL Final   03/11/2019 155 (H) 70 - 110 mg/dL Final   03/11/2019 154 (H) 70 - 110 mg/dL Final       Magnesium: No results for input(s): MG in the last 48 hours.    Significant Imaging: I have reviewed all pertinent imaging results/findings within the past 24 hours.

## 2019-03-14 NOTE — CONSULTS
[facility]  Infectious Disease  Consult Note    Patient Name: Sisi Medel  MRN: [id number]  Admission Date: 3/10/2019  Hospital Length of Stay: 4 days  Attending Physician: BOB Thompson MD  Primary Care Provider: Carlos A Caputo MD     Isolation Status: Contact    Patient information was obtained from patient, past medical records and ER records.      Inpatient consult to Infectious Diseases  Consult performed by: KIET Solano  Consult ordered by: Aleyda Linn NP        Assessment/Plan:     Skin ulcer    57 y/o with DM, ESRD on HD, pulm sarcoidosis, seizure disorder, PAF, CHF, and pulm HTN who presented with SOB and cough; CT chest revealed bibasilar consolidation, L>R; pt started on levaquin (on day 5); pt also with a RLE wound (followed by Lilian Haywood in wound care)- seen 3/8 and cultures were done- now positive for MRSA; ID consulted for recs; has been started on IV vancomycin.   - cultures 3/8 from LLE with MRSA  - pt afebrile, WBC count 17K today, up from 10K, however, no new symptoms    Plan:  1. Recommend d/c vancomycin and start doxycycline 100 mg PO BID for another 7 days  2. Continue levaquin for a total of 7 days  3. F/u with wound care upon discharge  4. ID will sign off         Thank you for your consult. I will sign off. Please contact us if you have any additional questions.  KIET Matson Pager: [phone]    Infectious Disease  [facility]    Subjective:     Principal Problem: Healthcare-associated pneumonia    HPI: This is a 57 y/o female with hx of NIDDM, ESRD on hemodialysis, pulmonary sarcoidosis, seizure disorder, PAF, hyperlipidemia, chronic combined systolic and diastolic heart failure, pulmonary hypertension and severe obesity who presents with shortness of breath for the past 2 weeks. Patient notes hypotension for the last 2 weeks associated with dialysis.  She does have a history of chronic hypotension for which she takes  midodrine but notes this is worse than her normal.  Patient also notes worsening shortness of breath especially on exertion.  She chronically wears oxygen about 3-5 L as needed for her pulmonary hypertension.  Patient notes becoming more short of breath as well as a dry cough.  She also notes some chills.  She denies any fevers.     In the ED, patient was found to have a left lower lobe pneumonia on CT scan.  Her lactic acid was also mildly elevated at 2.7.  Her BNP was elevated at 705.  Her troponin was also elevated at 0.173.  Patient was given a dose of Levaquin and was admitted for pneumonia.     Patient noted to have a RLE wound. Has been following with iLlian Haywood as an outpatient in wound care. Culture was obtained on 3/8 now positive for MRSA. Pt now on vancomycin.     Pt denies fevers or chills. Seen at dialysis. No complaints.     Past Medical History:   Diagnosis Date    Anemia in ESRD (end-stage renal disease) 3/7/2016    Anticoagulant long-term use     Cervical radiculopathy 7/20/2015    CHF (congestive heart failure)     Chronic combined systolic and diastolic heart failure 6/14/2013    EF 20% 2013, improved with Medical therapy, negative PET 2013    Chronic respiratory failure with hypoxia 04/22/2013    On home oxygen at 2-5 liters    Closed fracture of proximal end of right fibula 6/27/2016    Complications due to renal dialysis device, implant, and graft     DDD (degenerative disc disease), lumbar 6/17/2015    Dependence on renal dialysis     Diabetes mellitus type II, controlled 12/8/2017    ESRD on hemodialysis 2/23/2016    Essential hypertension     Gout     Lateral meniscal tear 5/31/2016    Lumbar stenosis 6/17/2015    Obesity, Class III, BMI 40-49.9 (morbid obesity)     Pacemaker     Paroxysmal atrial fibrillation 5/16/2014    Not on anticoagulation    Peripheral neuropathy 11/13/2013    Personal history of gastric ulcer 3/19/2013    Pneumonia of left lower lobe due to  infectious organism 3/10/2019    Sarcoidosis, lung 2009    Diagnosed in  with ocular manifestation, on 4L home O2 and PO steroids    Secondary pulmonary hypertension 2015    Seizure disorder 3/19/2013    Shingles 2013    Thyroid disease        Past Surgical History:   Procedure Laterality Date    ABDOMINAL SURGERY      ABLATION N/A 2017    Performed by [facility]imir [facility]orno [facility] at [facility] CATH LAB    ANGIOPLASTY Left 1/10/2018    Performed by Torrey Villafana MD at [facility] OR 2ND FLR    ANGIOPLASTY-PERCUTANEOUS TRANSLUMINAL (PTA) Left 2017    Performed by Tor[facility]ana MD at [facility] OR 2ND FLR    ANGIOPLASTY-PERCUTANEOUS TRANSLUMINAL (PTA) Left 10/12/2016    Performed by Torrey Villafana MD at [facility] OR [address] [place] [address]    CARDIAC PACEMAKER PLACEMENT       SECTION      x2    DECLOT GRAFT PERCUTANEOUS Left 2017    Performed by Torrey [facility]afana MD at [facility] OR 2ND FLR    DECLOT-GRAFT Left 2016    Performed by Harjit Starr MD at [facility] CATH LAB    DECLOT-GRAFT Left 2016    Performed by [facility]jit Starr MD at [facility] CATH LAB    ECHOCARDIOGRAM-TRANSESOPHAGEAL N/A 2013    Performed by Delmy Surgeon at [facility] DELMY    fistulogram Left 1/10/2018    Performed by Torrey Vill[facility]ana MD at [facility] OR 2ND FLR    FISTULOGRAM Left 2017    Performed by Tor[facility] Villafana MD at [facility] CATH LAB    FISTULOGRAM Left 10/12/2016    Performed by Torrey Villafana MD at [facility] OR 2ND FLR    FISTULOGRAM Left 2016    Performed by Harjit Starr MD at [facility] CATH LAB    Fistulogram, LUE AVG, possible intervention Left 2019    Performed by Tor[facility]afana MD at [facility] CATH LAB    INJECTION-STEROID-EPIDURAL-TRANSFORAMINAL Right 2015    Performed by Patria Gutierrez MD at [facility] MGT    INJECTION-STEROID-EPIDURAL-TRANSFORAMINAL Right 2015    Performed by Patria Gutierrez MD at [facility] MGT    KZJUDOWTQ-OQZNW-AKSYRVKGINZBA / Left upper AV graft. Left 2/3/2016     Performed by Torrey Villafana MD at [facility] OR 2ND FLR    WCTCNQMCF-ELSVETUME-CYEPCUQRYANMZ N/A 1/11/2017    Performed by Bladimir Adorno MD at [facility] CATH LAB    OTHER SURGICAL HISTORY      loop recorder implant    PLACEMENT-STENT Left 2/7/2017    Performed by Torrey Villafana MD at [facility] OR 2ND FLR    PTA, AV FISTULA N/A 1/30/2019    Performed by Torrey Villafana MD at [facility] CATH LAB    stent to fistula      TRANSESOPHAGEAL ECHOCARDIOGRAM (JARAD) N/A 6/27/2016    Performed by Sourav Machuca MD at [facility] CATH LAB    ULTRASOUND, UPPER GI TRACT, ENDOSCOPIC N/A 1/9/2014    Performed by Stewart Burgess MD at [facility] ENDO (2ND FLR)    VASCULAR SURGERY      fistula to L upper arm        Review of patient's allergies indicates:   Allergen Reactions    Bactrim [sulfamethoxazole-trimethoprim] Other (See Comments)     Causes renal failure    Nsaids (non-steroidal anti-inflammatory drug) Other (See Comments)     Renal failure       Medications:  Medications Prior to Admission   Medication Sig    aspirin (ECOTRIN) 81 MG EC tablet Take 81 mg by mouth once daily.    atorvastatin (LIPITOR) 80 MG tablet TAKE 1 TABLET BY MOUTH EVERY EVENING    betamethasone dipropionate (DIPROLENE) 0.05 % ointment AAA bid right lower leg    blood sugar diagnostic Strp 1 each by Misc.(Non-Drug; Combo Route) route once daily.    blood-glucose meter kit Use as instructed    BOOSTRIX TDAP 2.5-8-5 Lf-mcg-Lf/0.5mL Syrg injection ADM 0.5ML IM UTD    ciclopirox (PENLAC) 8 % Soln Apply daily to affected nail. Must remove and restart weekly    clopidogrel (PLAVIX) 75 mg tablet Take 1 tablet (75 mg total) by mouth once daily.    fludrocortisone (FLORINEF) 0.1 mg Tab Take 100 mcg by mouth 2 (two) times daily.    ketorolac 0.5% (ACULAR) 0.5 % Drop instill one drop into both eyes every morning    lancets Misc 1 each by Misc.(Non-Drug; Combo Route) route once daily.    levETIRAcetam (KEPPRA) 500 MG Tab Take 1 tablet (500 mg total) by  mouth 2 (two) times daily.    midodrine (PROAMATINE) 5 MG Tab     nortriptyline (PAMELOR) 25 MG capsule TK 1 C PO QPM    omeprazole (PRILOSEC) 20 MG capsule TAKE 1 CAPSULE BY MOUTH EVERY DAY    prednisoLONE acetate (PRED FORTE) 1 % DrpS INSTILL ONE DROP INTO  LEFT EYE THREE TIMES A DAY    predniSONE (DELTASONE) 20 MG tablet Take 1 tablet (20 mg total) by mouth once daily.    pregabalin (LYRICA) 100 MG capsule Take 1 capsule (100 mg total) by mouth 3 (three) times daily.    PREVNAR 13, PF, 0.5 mL Syrg ADM 0.5ML IM UTD    DENISE-LINDA 0.8 mg Tab TK 1 T PO QD    RENVELA 800 mg Tab TAKE 1 TABLET BY MOUTH THREE TIMES DAILY WITH MEALS    sodium polystyrene (KAYEXALATE) 15 gram/60 mL Susp Take 60 mLs (15 g total) by mouth daily as needed. Take when directed by your doctor    terbinafine HCl (LAMISIL) 250 mg tablet Take 250 mg by mouth once daily.     tiZANidine (ZANAFLEX) 4 MG tablet TAKE 1 TABLET BY MOUTH EVERY DAY AS NEEDED FOR MUSCLE SPASMS    TRADJENTA 5 mg Tab tablet TAKE 1 TABLET(5 MG) BY MOUTH EVERY DAY    warfarin (COUMADIN) 5 MG tablet TAKE 1 TABLET BY MOUTH EVERY DAY EXCEPT TH 1AND 1/2 TABLETS ON MONDAY AND FRIDAY OR AS DIRECTED    warfarin (COUMADIN) 5 MG tablet Take 0.5-1 tablets (2.5-5 mg total) by mouth Daily. Per [facility]    lancing device Misc 1 Device by Misc.(Non-Drug; Combo Route) route 2 (two) times daily with meals.     Antibiotics (From admission, onward)    Start     Stop Route Frequency Ordered    03/13/19 1900  levoFLOXacin tablet 500 mg      -- Oral Every 48 hours (non-standard times) 03/12/19 1451    03/10/19 1245  bacitracin-polymyxin b ointment     Question:  Is the patient competent?  Answer:  Yes    -- Top Daily 03/10/19 1236        Antifungals (From admission, onward)    Start     Stop Route Frequency Ordered    03/11/19 0900  ciclopirox 0.77 % cream      -- Top Daily 03/10/19 1735        Antivirals (From admission, onward)    None           Immunization History    Administered Date(s) Administered    Influenza 10/14/2009, 2010, 2011, 10/04/2012, 09/15/2014, 2015, 2016    Influenza - Quadrivalent - PF 09/15/2014, 2016    Influenza - Trivalent (ADULT) 2013, 09/15/2014, 2015    PPD Test 2010, 2016    Pneumococcal Conjugate - 13 Valent 2018    Pneumococcal Polysaccharide - 23 Valent 2012, 2016    Td (ADULT) 2008    Tdap 2018       Family History     Problem Relation (Age of Onset)    Coronary artery disease Father    Diabetes Mother, Father, Maternal Grandmother    Hypertension Mother, Father    Kidney failure Mother    Lupus Sister        Social History     Socioeconomic History    Marital status: Single     Spouse name: None    Number of children: None    Years of education: None    Highest education level: None   Social Needs    Financial resource strain: None    Food insecurity - worry: None    Food insecurity - inability: None    Transportation needs - medical: None    Transportation needs - non-medical: None   Occupational History    None   Tobacco Use    Smoking status: Former Smoker     Packs/day: 0.50     Years: 10.00     Pack years: 5.00     Types: Cigarettes     Last attempt to quit: 1994     Years since quittin.9    Smokeless tobacco: Never Used   Substance and Sexual Activity    Alcohol use: No     Alcohol/week: 0.0 oz     Comment: rarely    Drug use: No    Sexual activity: Not Currently   Other Topics Concern    Are you pregnant or think you may be? No    Breast-feeding No   Social History Narrative    Lives with boyfriend/partner who helps with her care.      Review of Systems   Constitutional: Negative for chills and fever.   Respiratory: Negative for cough and shortness of breath.    Cardiovascular: Negative for chest pain and leg swelling.   Gastrointestinal: Negative for abdominal pain, constipation, diarrhea, nausea and vomiting.    Genitourinary: Negative for dysuria, frequency and hematuria.   Musculoskeletal: Negative for back pain and myalgias.   Skin: Positive for wound (right lower extremity). Negative for rash.   Neurological: Negative for dizziness, weakness, light-headedness, numbness and headaches.   Psychiatric/Behavioral: Negative for agitation and behavioral problems. The patient is not nervous/anxious.      Objective:     Vital Signs (Most Recent):  Temp: 97.6 °F (36.4 °C) (03/14/19 0850)  Pulse: (!) 56 (03/14/19 1245)  Resp: 17 (03/14/19 1245)  BP: 109/61 (03/14/19 1245)  SpO2: 98 % (03/14/19 0850) Vital Signs (24h Range):  Temp:  [97.6 °F (36.4 °C)-97.7 °F (36.5 °C)] 97.6 °F (36.4 °C)  Pulse:  [56-77] 56  Resp:  [16-24] 17  SpO2:  [95 %-98 %] 98 %  BP: ()/(53-90) 109/61     Weight: 119.3 kg (263 lb)  Body mass index is 39.99 kg/m².    Estimated Creatinine Clearance: 14.8 mL/min (A) (based on SCr of 5.7 mg/dL (H)).    Physical Exam   Constitutional: She is oriented to person, place, and time. She appears well-developed and well-nourished. No distress.   On HD   Cardiovascular: Normal rate and regular rhythm.   No murmur heard.  Pulmonary/Chest: Effort normal and breath sounds normal. No respiratory distress.   Abdominal: Soft. Bowel sounds are normal. She exhibits no distension.   Musculoskeletal: Normal range of motion. She exhibits no edema.   Neurological: She is alert and oriented to person, place, and time.   Skin: Skin is warm and dry.   Right lower extremity wound to lateral side. No fluctuance. Minimal drainage. No induration.    Psychiatric: She has a normal mood and affect. Her behavior is normal.       Significant Labs:   Blood Culture:   Recent Labs   Lab 03/10/19  2041 03/10/19  2042   LABBLOO No Growth to date  No Growth to date  No Growth to date  No Growth to date No Growth to date  No Growth to date  No Growth to date  No Growth to date     CBC:   Recent Labs   Lab 03/13/19  0651 03/14/19  0753    WBC 10.18 17.41*   HGB 12.4 13.7   HCT 40.1 45.1    155     CMP:   Recent Labs   Lab 03/13/19  0655 03/13/19  1705 03/14/19  0741 03/14/19  1259   *  --  138  --    K 5.4*  --  4.6  --    CL 94*  --  98  --    CO2 18*  --  26  --    GLU 68*  --  78  --    BUN 39*  --  39*  --    CREATININE 6.7*  --  5.7*  --    CALCIUM 7.7*  --  8.7  --    ALBUMIN  --  3.2*  --  3.5   ANIONGAP 16  --  14  --    EGFRNONAA 6.3*  --  7.6*  --      Urine Culture: No results for input(s): LABURIN in the last 4320 hours.  Urine Studies: No results for input(s): COLORU, APPEARANCEUA, PHUR, SPECGRAV, PROTEINUA, GLUCUA, KETONESU, BILIRUBINUA, OCCULTUA, NITRITE, UROBILINOGEN, LEUKOCYTESUR, RBCUA, WBCUA, BACTERIA, SQUAMEPITHEL, HYALINECASTS in the last 4320 hours.    Invalid input(s): VIVIANE  Wound Culture:   Recent Labs   Lab 02/01/19  0724 03/08/19  0500   LABAERO Skin delio,  no predominant organism METHICILLIN RESISTANT STAPHYLOCOCCUS AUREUS  Few         Significant Imaging: I have reviewed all pertinent imaging results/findings within the past 24 hours.

## 2019-03-14 NOTE — ASSESSMENT & PLAN NOTE
- increasing 9.08--10.55--7.97--10.18--17.41  - was on Levaquin iv for pneumonia respiratory status improved changed to oral  - was given Vanc x2 for coverage of MRSA PNA  - restarted on Vanc on 3/13 when lower extremity wound cx with MRSA--receiving after dialysis  - Consulted ID  - afebrile, without sweats or chills

## 2019-03-14 NOTE — PROGRESS NOTES
Pt arrived to [facility] via stretcher. Isolation status MRSA contact, HD T,TH,S, maintenance pt. Pt alert & stable. Thrill/bruit noted, accessed LT AVG X2 15G needles without difficulty. Duration 4.0hrs, Qb 400ml/min, Qd 800ml/min, planned UFG 3.0L, orders reviewed.

## 2019-03-14 NOTE — PROGRESS NOTES
[facility] Hospital Medicine  Progress Note    Patient Name: Sisi Medel  MRN: [id number]  Patient Class: IP- Inpatient   Admission Date: 3/10/2019  Length of Stay: 4 days  Attending Physician: BOB Thompson MD  Primary Care Provider: Carlos A Caputo MD    [facility] Medicine Team: [facility] MED O Aleyda Linn NP    Subjective:     Principal Problem:Healthcare-associated pneumonia    HPI:  Sisi Medel is a 58 y.o. female with hx of NIDDM, ESRD on hemodialysis, pulmonary sarcoidosis, seizure disorder, PAF, hyperlipidemia, chronic combined systolic and diastolic heart failure, pulmonary hypertension and severe obesity who presents with shortness of breath for the last 2 weeks.  Patient notes hypotension for the last 2 weeks associated with dialysis.  She does have a history of chronic hypotension for which she takes midodrine but notes this is worse than her normal.  Patient also notes worsening shortness of breath especially on exertion.  She chronically wears oxygen about 3-5 L as needed for her pulmonary hypertension.  Patient notes becoming more short of breath as well as a dry cough.  She also notes some chills.  She denies any fevers.  She denies any sick contacts although she is a dialysis patient.  Patient denies any vomiting but has had some nausea.  She also notes some lower abdominal pain and constipation.  Patient denies any diarrhea.  She does not make any urine.  Patient notes dizziness, lightheadedness and near-syncope associated with her low blood pressure.  She notes she almost fainted yesterday.  Patient notes they were unable to complete dialysis yesterday due to dizziness and hypotension.    Hospital Course:  Patient admitted on 3/10/19 with Pneumonia. In the ED, patient was found to have a left lower lobe pneumonia on CT scan.  Her lactic acid was also mildly elevated at 2.7.  Her BNP was elevated at 705.  Her troponin was also elevated at 0.173.   Patient was given a dose of Levaquin and admission was requested for pneumonia.  Patient receiving HD inpatient and required additional dialysis run for fluid removal.  With improved breathing Levaquin transitioned to oral. Patient with MRSA in lower extremity ulceration. Started on Vanc consulted ID.    Interval History: Patient seen in dialysis. Discussed MRSA in wound and currently on Vanc. Pending ID consult. Today with leukocytosis.     Review of Systems   Constitutional: Negative for appetite change, chills, fatigue and fever.   HENT: Negative for trouble swallowing.    Respiratory: Positive for cough. Negative for chest tightness, shortness of breath and wheezing.    Cardiovascular: Negative for chest pain, palpitations and leg swelling.   Gastrointestinal: Negative for abdominal pain, constipation, diarrhea and nausea.   Genitourinary: Negative for difficulty urinating, frequency and urgency.        + anuric   Musculoskeletal: Negative for arthralgias and myalgias.   Skin: Negative for rash.        +RLE wound   Neurological: Negative for dizziness, weakness, light-headedness and headaches.   Psychiatric/Behavioral: Negative for sleep disturbance.     Scheduled Meds:   sodium chloride 0.9%   Intravenous Once    sodium chloride 0.9%   Intravenous Once    aspirin  81 mg Oral Daily    atorvastatin  80 mg Oral Daily    bacitracin-polymyxin b   Topical (Top) Daily    ciclopirox   Topical (Top) Daily    clopidogrel  75 mg Oral Daily    fludrocortisone  100 mcg Oral BID    ketorolac 0.5%  1 drop Both Eyes BID    levETIRAcetam  500 mg Oral BID    levoFLOXacin  500 mg Oral Q48H    midodrine  5 mg Oral TID WM    nortriptyline  25 mg Oral with dinner    pantoprazole  40 mg Oral Daily    polyethylene glycol  17 g Oral Daily    prednisoLONE acetate  1 drop Left Eye TID    predniSONE  20 mg Oral Daily    pregabalin  100 mg Oral TID    senna-docusate 8.6-50 mg  1 tablet Oral BID    sevelamer carbonate   800 mg Oral TID WM    vancomycin (VANCOCIN) IVPB  1,000 mg Intravenous Every Tues, Thurs, Sat    warfarin  5 mg Oral Daily     Continuous Infusions:  PRN Meds:.sodium chloride 0.9%, acetaminophen, benzonatate, bisacodyl, dextrose 50%, dextrose 50%, glucagon (human recombinant), glucose, glucose, insulin aspart U-100, midodrine, ondansetron, promethazine, ramelteon, sodium chloride 0.9%, sodium chloride 0.9%, tiZANidine    Objective:     Vital Signs (Most Recent):  Temp: 97.6 °F (36.4 °C) (03/14/19 0850)  Pulse: 71 (03/14/19 1100)  Resp: (!) 21 (03/14/19 1100)  BP: 127/68 (03/14/19 1100)  SpO2: 98 % (03/14/19 0850) Vital Signs (24h Range):  Temp:  [97.6 °F (36.4 °C)-97.7 °F (36.5 °C)] 97.6 °F (36.4 °C)  Pulse:  [56-77] 71  Resp:  [16-24] 21  SpO2:  [95 %-98 %] 98 %  BP: ()/(53-90) 127/68     Weight: 119.3 kg (263 lb)  Body mass index is 39.99 kg/m².    Intake/Output Summary (Last 24 hours) at 3/14/2019 1127  Last data filed at 3/13/2019 1815  Gross per 24 hour   Intake 780 ml   Output 3600 ml   Net -2820 ml      Physical Exam   Constitutional: She is oriented to person, place, and time. She appears well-developed and well-nourished. No distress.   Obese   Cardiovascular: Normal rate, regular rhythm and normal heart sounds. Exam reveals no gallop and no friction rub.   No murmur heard.  + left chest pacemaker   Pulmonary/Chest: Effort normal and breath sounds normal. No respiratory distress. She has no wheezes. She has no rales.   +O2 use via NC   Abdominal: Soft. Bowel sounds are normal. She exhibits no distension. There is no tenderness.   Musculoskeletal: Normal range of motion. She exhibits edema. She exhibits no tenderness.   1+ pedal edema; RLE dressing in place c/d/i   Neurological: She is alert and oriented to person, place, and time.   Skin: Skin is warm and dry. No rash noted. She is not diaphoretic. No cyanosis. Nails show no clubbing.   Psychiatric: She has a normal mood and affect. Her behavior is  normal.       Significant Labs:   BMP:   Recent Labs   Lab 03/14/19  0741   GLU 78      K 4.6   CL 98   CO2 26   BUN 39*   CREATININE 5.7*   CALCIUM 8.7     CBC:   Recent Labs   Lab 03/13/19  0651 03/14/19  0741   WBC 10.18 17.41*   HGB 12.4 13.7   HCT 40.1 45.1    155     POCT Glucose   Date Value Ref Range Status   03/13/2019 196 (H) 70 - 110 mg/dL Final   03/13/2019 136 (H) 70 - 110 mg/dL Final   03/13/2019 157 (H) 70 - 110 mg/dL Final   03/13/2019 90 70 - 110 mg/dL Final   03/12/2019 324 (H) 70 - 110 mg/dL Final   03/12/2019 104 70 - 110 mg/dL Final   03/12/2019 100 70 - 110 mg/dL Final   03/11/2019 227 (H) 70 - 110 mg/dL Final   03/11/2019 155 (H) 70 - 110 mg/dL Final   03/11/2019 154 (H) 70 - 110 mg/dL Final     Lab Results   Component Value Date    INR 1.5 (H) 03/14/2019    INR 1.4 (H) 03/13/2019    INR 2.0 (H) 03/12/2019       Magnesium: No results for input(s): MG in the last 48 hours.    Significant Imaging: I have reviewed all pertinent imaging results/findings within the past 24 hours.    Assessment/Plan:      * Healthcare-associated pneumonia    - given Levaquin in ED, will continue--transitioned to oral with improvement in breathing  - check blood cultures x2, respiratory culture  - check rapid flu -negative  - tele monitoring  - lactic acid elevated, resolved  - continue home O2     Skin ulcer    - wound care consulted  - continue current recommendations  - cx + MRSA  - started on vanc after dialysis  - consulted ID     Diabetes mellitus type II, controlled    - hold home meds  - ISS  - bedside glucose monitoring  - diabetic diet  - check A1c- 6.2     End stage renal failure on dialysis    - consult Nephrology, appreciate recs     Paroxysmal atrial fibrillation    - tele monitoring  - continue Coumadin  - daily PT/INR  - appreciate pharmacist assistance with dosing  - subtherapeutic today but increasing     Secondary adrenal insufficiency    - Chronic  - continue Florinef, midodrine  -  Midodrine 15mg 30min prior to dialysis     Chronic respiratory failure with hypoxia    - due to pulmonary sarcoidosis  - continue supplemental oxygen     Chronic combined systolic and diastolic heart failure    - does not appear in acute exacerbation  - check 2D echo- PA is 56 d/w HTS likely related to increase volume, continue to maximize BP control with midodrine for dialysis, suspected for PA to return to baseline once fluid is removed. Patient to f/u in clinic  - on HD     Hyperlipidemia    - chronic and stable  - continue statin     Leukocytosis    - increasing 9.08--10.55--7.97--10.18--17.41  - was on Levaquin iv for pneumonia respiratory status improved changed to oral  - was given Vanc x2 for coverage of MRSA PNA  - restarted on Vanc on 3/13 when lower extremity wound cx with MRSA--receiving after dialysis  - Consulted ID  - afebrile, without sweats or chills     Pulmonary hypertension    - follows with pulmonology here  - continue home O2     Morbid obesity with BMI of 40.0-44.9, adult    - counseled on diet and exercise     Seizure disorder    - chronic and stable  - continue Keppra     Pulmonary sarcoidosis    - continue steroids  - continue home oxygen       VTE Risk Mitigation (From admission, onward)        Ordered     warfarin (COUMADIN) tablet 5 mg  Daily      03/13/19 1408     IP VTE HIGH RISK PATIENT  Once      03/10/19 1735     Place sequential compression device  Until discontinued      03/10/19 1735     Reason for No Pharmacological VTE Prophylaxis  Once      03/10/19 1735          Aleyda Linn NP  Department of Hospital Medicine   [facility]

## 2019-03-14 NOTE — ASSESSMENT & PLAN NOTE
57 y/o with DM, ESRD on HD, pulm sarcoidosis, seizure disorder, PAF, CHF, and pulm HTN who presented with SOB and cough; CT chest revealed bibasilar consolidation, L>R; pt started on levaquin (on day 5); pt also with a RLE wound (followed by Lilian Haywood in wound care)- seen 3/8 and cultures were done- now positive for MRSA; ID consulted for recs; has been started on IV vancomycin.   - cultures 3/8 from LLE with MRSA  - pt afebrile, WBC count 17K today, up from 10K, however, no new symptoms    Plan:  1. Recommend d/c vancomycin and start doxycycline 100 mg PO BID for another 7 days  2. Continue levaquin for a total of 7 days  3. F/u with wound care upon discharge  4. ID will sign off

## 2019-03-14 NOTE — SUBJECTIVE & OBJECTIVE
Past Medical History:   Diagnosis Date    Anemia in ESRD (end-stage renal disease) 3/7/2016    Anticoagulant long-term use     Cervical radiculopathy 2015    CHF (congestive heart failure)     Chronic combined systolic and diastolic heart failure 2013    EF 20% , improved with Medical therapy, negative PET     Chronic respiratory failure with hypoxia 2013    On home oxygen at 2-5 liters    Closed fracture of proximal end of right fibula 2016    Complications due to renal dialysis device, implant, and graft     DDD (degenerative disc disease), lumbar 2015    Dependence on renal dialysis     Diabetes mellitus type II, controlled 2017    ESRD on hemodialysis 2016    Essential hypertension     Gout     Lateral meniscal tear 2016    Lumbar stenosis 2015    Obesity, Class III, BMI 40-49.9 (morbid obesity)     Pacemaker     Paroxysmal atrial fibrillation 2014    Not on anticoagulation    Peripheral neuropathy 2013    Personal history of gastric ulcer 3/19/2013    Pneumonia of left lower lobe due to infectious organism 3/10/2019    Sarcoidosis, lung 2009    Diagnosed in  with ocular manifestation, on 4L home O2 and PO steroids    Secondary pulmonary hypertension 2015    Seizure disorder 3/19/2013    Shingles 2013    Thyroid disease        Past Surgical History:   Procedure Laterality Date    ABDOMINAL SURGERY      ABLATION N/A 2017    Performed by [facility]imir [facility]orno MD at [facility] CATH LAB    ANGIOPLASTY Left 1/10/2018    Performed by Torrey Villafana MD at [facility] OR 2ND FLR    ANGIOPLASTY-PERCUTANEOUS TRANSLUMINAL (PTA) Left 2017    Performed by Torrey [facility]afana MD at [facility] OR 2ND FLR    ANGIOPLASTY-PERCUTANEOUS TRANSLUMINAL (PTA) Left 10/12/2016    Performed by Torrey [facility]afana MD at [facility] OR 2ND FLR    CARDIAC PACEMAKER PLACEMENT       SECTION      x2    DECLOT GRAFT PERCUTANEOUS Left  2/7/2017    Performed by [facility] at [facility] OR 2ND FLR    DECLOT-GRAFT Left 6/21/2016    Performed by Harjit Starr MD at [facility] CATH LAB    DECLOT-GRAFT Left 6/20/2016    Performed by Harjit Starr MD at [facility] CATH LAB    ECHOCARDIOGRAM-TRANSESOPHAGEAL N/A 6/27/2013    Performed by Delmy Surgeon at [facility] DELMY    fistulogram Left 1/10/2018    Performed by Torrey Villafana MD at [facility] OR 2ND FLR    FISTULOGRAM Left 9/13/2017    Performed by Torrey Villafana MD at [facility] CATH LAB    FISTULOGRAM Left 10/12/2016    Performed by Torrey Villafana MD at [facility] OR 2ND FLR    FISTULOGRAM Left 6/21/2016    Performed by Harjit Starr MD at [facility] CATH LAB    Fistulogram, LUE AVG, possible intervention Left 1/30/2019    Performed by Torrey Villafana MD at [facility] CATH LAB    INJECTION-STEROID-EPIDURAL-TRANSFORAMINAL Right 7/20/2015    Performed by [facility]rez [facility] at [facility] PAIN MGT    INJECTION-STEROID-EPIDURAL-TRANSFORAMINAL Right 5/21/2015    [facility]    [id number]-RGCVY-[id number] / Left upper AV graft. Left 2/3/2016    Performed by Torrey Villafana MD at [facility] OR 2ND FLR    ASTQCSNDX-SKPYHLTVX-AKBMNXTOZTJWX N/A 1/11/2017    Performed by Bladimir Adorno MD at [facility] CATH LAB    OTHER SURGICAL HISTORY      loop recorder implant    PLACEMENT-STENT Left 2/7/2017    Performed by Torrey Villafana MD at [facility] OR 2ND FLR    PTA, AV FISTULA N/A 1/30/2019    Performed by Torrey Villafana MD at [facility] CATH LAB    stent to fistula      TRANSESOPHAGEAL ECHOCARDIOGRAM (JARAD) N/A 6/27/2016    Performed by Sourav Machuca MD at [facility] CATH LAB    ULTRASOUND, UPPER GI TRACT, ENDOSCOPIC N/A 1/9/2014    Performed by Stewart Burgess MD at [facility] ENDO (2ND FLR)    VASCULAR SURGERY      fistula to L upper arm        Review of patient's allergies indicates:   Allergen Reactions    Bactrim [sulfamethoxazole-trimethoprim] Other (See Comments)     Causes renal  failure    Nsaids (non-steroidal anti-inflammatory drug) Other (See Comments)     Renal failure       Medications:  Medications Prior to Admission   Medication Sig    aspirin (ECOTRIN) 81 MG EC tablet Take 81 mg by mouth once daily.    atorvastatin (LIPITOR) 80 MG tablet TAKE 1 TABLET BY MOUTH EVERY EVENING    betamethasone dipropionate (DIPROLENE) 0.05 % ointment AAA bid right lower leg    blood sugar diagnostic Strp 1 each by Misc.(Non-Drug; Combo Route) route once daily.    blood-glucose meter kit Use as instructed    BOOSTRIX TDAP 2.5-8-5 Lf-mcg-Lf/0.5mL Syrg injection ADM 0.5ML IM UTD    ciclopirox (PENLAC) 8 % Soln Apply daily to affected nail. Must remove and restart weekly    clopidogrel (PLAVIX) 75 mg tablet Take 1 tablet (75 mg total) by mouth once daily.    fludrocortisone (FLORINEF) 0.1 mg Tab Take 100 mcg by mouth 2 (two) times daily.    ketorolac 0.5% (ACULAR) 0.5 % Drop instill one drop into both eyes every morning    lancets Misc 1 each by Misc.(Non-Drug; Combo Route) route once daily.    levETIRAcetam (KEPPRA) 500 MG Tab Take 1 tablet (500 mg total) by mouth 2 (two) times daily.    midodrine (PROAMATINE) 5 MG Tab     nortriptyline (PAMELOR) 25 MG capsule TK 1 C PO QPM    omeprazole (PRILOSEC) 20 MG capsule TAKE 1 CAPSULE BY MOUTH EVERY DAY    prednisoLONE acetate (PRED FORTE) 1 % DrpS INSTILL ONE DROP INTO  LEFT EYE THREE TIMES A DAY    predniSONE (DELTASONE) 20 MG tablet Take 1 tablet (20 mg total) by mouth once daily.    pregabalin (LYRICA) 100 MG capsule Take 1 capsule (100 mg total) by mouth 3 (three) times daily.    PREVNAR 13, PF, 0.5 mL Syrg ADM 0.5ML IM UTD    DENISE-LINDA 0.8 mg Tab TK 1 T PO QD    RENVELA 800 mg Tab TAKE 1 TABLET BY MOUTH THREE TIMES DAILY WITH MEALS    sodium polystyrene (KAYEXALATE) 15 gram/60 mL Susp Take 60 mLs (15 g total) by mouth daily as needed. Take when directed by your doctor    terbinafine HCl (LAMISIL) 250 mg tablet Take 250 mg by mouth  once daily.     tiZANidine (ZANAFLEX) 4 MG tablet TAKE 1 TABLET BY MOUTH EVERY DAY AS NEEDED FOR MUSCLE SPASMS    TRADJENTA 5 mg Tab tablet TAKE 1 TABLET(5 MG) BY MOUTH EVERY DAY    warfarin (COUMADIN) 5 MG tablet TAKE 1 TABLET BY MOUTH EVERY DAY EXCEPT TH 1AND 1/2 TABLETS ON MONDAY AND FRIDAY OR AS DIRECTED    warfarin (COUMADIN) 5 MG tablet Take 0.5-1 tablets (2.5-5 mg total) by mouth Daily. Per [facility]    lancing device Misc 1 Device by Misc.(Non-Drug; Combo Route) route 2 (two) times daily with meals.     Antibiotics (From admission, onward)    Start     Stop Route Frequency Ordered    03/13/19 1900  levoFLOXacin tablet 500 mg      -- Oral Every 48 hours (non-standard times) 03/12/19 1451    03/10/19 1245  bacitracin-polymyxin b ointment     Question:  Is the patient competent?  Answer:  Yes    -- Top Daily 03/10/19 1236        Antifungals (From admission, onward)    Start     Stop Route Frequency Ordered    03/11/19 0900  ciclopirox 0.77 % cream      -- Top Daily 03/10/19 1735        Antivirals (From admission, onward)    None           Immunization History   Administered Date(s) Administered    Influenza 10/14/2009, 09/21/2010, 09/12/2011, 10/04/2012, 09/15/2014, 09/16/2015, 09/01/2016    Influenza - Quadrivalent - PF 09/15/2014, 09/01/2016    Influenza - Trivalent (ADULT) 09/06/2013, 09/15/2014, 09/16/2015    PPD Test 08/08/2010, 02/19/2016    Pneumococcal Conjugate - 13 Valent 05/02/2018    Pneumococcal Polysaccharide - 23 Valent 06/14/2012, 06/14/2016    Td (ADULT) 02/07/2008    Tdap 08/01/2018       Family History     Problem Relation (Age of Onset)    Coronary artery disease Father    Diabetes Mother, Father, Maternal Grandmother    Hypertension Mother, Father    Kidney failure Mother    Lupus Sister        Social History     Socioeconomic History    Marital status: Single     Spouse name: None    Number of children: None    Years of education: None    Highest education level:  None   Social Needs    Financial resource strain: None    Food insecurity - worry: None    Food insecurity - inability: None    Transportation needs - medical: None    Transportation needs - non-medical: None   Occupational History    None   Tobacco Use    Smoking status: Former Smoker     Packs/day: 0.50     Years: 10.00     Pack years: 5.00     Types: Cigarettes     Last attempt to quit: 1994     Years since quittin.9    Smokeless tobacco: Never Used   Substance and Sexual Activity    Alcohol use: No     Alcohol/week: 0.0 oz     Comment: rarely    Drug use: No    Sexual activity: Not Currently   Other Topics Concern    Are you pregnant or think you may be? No    Breast-feeding No   Social History Narrative    Lives with boyfriend/partner who helps with her care.      Review of Systems   Constitutional: Negative for chills and fever.   Respiratory: Negative for cough and shortness of breath.    Cardiovascular: Negative for chest pain and leg swelling.   Gastrointestinal: Negative for abdominal pain, constipation, diarrhea, nausea and vomiting.   Genitourinary: Negative for dysuria, frequency and hematuria.   Musculoskeletal: Negative for back pain and myalgias.   Skin: Positive for wound (right lower extremity). Negative for rash.   Neurological: Negative for dizziness, weakness, light-headedness, numbness and headaches.   Psychiatric/Behavioral: Negative for agitation and behavioral problems. The patient is not nervous/anxious.      Objective:     Vital Signs (Most Recent):  Temp: 97.6 °F (36.4 °C) (19 0850)  Pulse: (!) 56 (19 1245)  Resp: 17 (19 1245)  BP: 109/61 (19 1245)  SpO2: 98 % (19 0850) Vital Signs (24h Range):  Temp:  [97.6 °F (36.4 °C)-97.7 °F (36.5 °C)] 97.6 °F (36.4 °C)  Pulse:  [56-77] 56  Resp:  [16-24] 17  SpO2:  [95 %-98 %] 98 %  BP: ()/(53-90) 109/61     Weight: 119.3 kg (263 lb)  Body mass index is 39.99 kg/m².    Estimated Creatinine  Clearance: 14.8 mL/min (A) (based on SCr of 5.7 mg/dL (H)).    Physical Exam   Constitutional: She is oriented to person, place, and time. She appears well-developed and well-nourished. No distress.   On HD   Cardiovascular: Normal rate and regular rhythm.   No murmur heard.  Pulmonary/Chest: Effort normal and breath sounds normal. No respiratory distress.   Abdominal: Soft. Bowel sounds are normal. She exhibits no distension.   Musculoskeletal: Normal range of motion. She exhibits no edema.   Neurological: She is alert and oriented to person, place, and time.   Skin: Skin is warm and dry.   Right lower extremity wound to lateral side. No fluctuance. Minimal drainage. No induration.    Psychiatric: She has a normal mood and affect. Her behavior is normal.       Significant Labs:   Blood Culture:   Recent Labs   Lab 03/10/19  2041 03/10/19  2042   LABBLOO No Growth to date  No Growth to date  No Growth to date  No Growth to date No Growth to date  No Growth to date  No Growth to date  No Growth to date     CBC:   Recent Labs   Lab 03/13/19  0651 03/14/19  0741   WBC 10.18 17.41*   HGB 12.4 13.7   HCT 40.1 45.1    155     CMP:   Recent Labs   Lab 03/13/19  0655 03/13/19  1705 03/14/19  0741 03/14/19  1259   *  --  138  --    K 5.4*  --  4.6  --    CL 94*  --  98  --    CO2 18*  --  26  --    GLU 68*  --  78  --    BUN 39*  --  39*  --    CREATININE 6.7*  --  5.7*  --    CALCIUM 7.7*  --  8.7  --    ALBUMIN  --  3.2*  --  3.5   ANIONGAP 16  --  14  --    EGFRNONAA 6.3*  --  7.6*  --      Urine Culture: No results for input(s): LABURIN in the last 4320 hours.  Urine Studies: No results for input(s): COLORU, APPEARANCEUA, PHUR, SPECGRAV, PROTEINUA, GLUCUA, KETONESU, BILIRUBINUA, OCCULTUA, NITRITE, UROBILINOGEN, LEUKOCYTESUR, RBCUA, WBCUA, BACTERIA, SQUAMEPITHEL, HYALINECASTS in the last 4320 hours.    Invalid input(s): VIVIANE  Wound Culture:   Recent Labs   Lab 02/01/19  0724 03/08/19  8102    LABAERO Skin delio,  no predominant organism METHICILLIN RESISTANT STAPHYLOCOCCUS AUREUS  Few         Significant Imaging: I have reviewed all pertinent imaging results/findings within the past 24 hours.

## 2019-03-14 NOTE — PROGRESS NOTES
HEMODIALYSIS NOTE  Patient evaluated while undergoing hemodialysis indicated for ESRD. Tolerating session with current UFR, no complications.  to AVG. DW 115kg. Per HD RN, wt post-HD yesterday 118.6kg; pre-wt today 118.6kg    -on contact isolation 2/2 MRSA  -on scheduled midodrine 5 mg  -also with PRN midodrine 15 mg before HD  -HD RN to medicate with PRN dose now  -UF goal 3.1L as tolerated  -if tolerates tx, pt to get to rx DW  -hold epogen  -on sevelamer; Ph  -albumin  -daily CBC, renal function panel  -continue renal diet with 800mL FR/dy, I/Os, and daily wts

## 2019-03-14 NOTE — PROGRESS NOTES
Hospital Medicine PharmD Consult: Vancomycin Follow-up Note     Sisi Medel is a 58 y.o. female initiated on antimicrobial therapy for SSTI with vancomycin.     Renal function: Estimated Creatinine Clearance: 9.5 mL/min (A) (based on SCr of 8.9 mg/dL (H)).   Patient on iHD TTS as outpatient, plans for HD today.     Vancomycin serum concentration assessment(s):  · Vancomycin random this AM 21.3 mcg/mL. Goal to redose <15 mcg/mL since only treating SSTI     Vancomycin regimen plan:  · Vancomycin level after HD today likely <15 mcg/mL, would supplement with only vancomycin 500 mg IV post-HD. Seems that 1g is too much as patient got a dose after HD on 3/12 and had HD yesterday, on 3/13, did not receive a supplemental dose and still has a level >21 mcg/mL.      Pharmacy will continue to follow and monitor vancomycin.        PHARMACY CONSULT NOTE: WARFARIN  iSsi Medel is a 58 y.o. female on warfarin therapy for Atrial Fibrillation. PharmD has been consulted for warfarin dosing.    Current order: 5mg daily  Home dose: 5mg daily  Coumadin clinic enrollment: Active  INR goal: 2-3    Lab Results   Component Value Date    INR 1.5 (H) 03/14/2019    INR 1.4 (H) 03/13/2019    INR 2.0 (H) 03/12/2019     Significant drug interactions: levofloxacin  Diet: Adult Diabetic without supplements     Recommendation(s):    INR 1.5 today, first dose of 7.5mg likely just starting to kick in so would resume home dose of 5mg daily. If doesn't continue to trend up can boost again.    INR daily (ordered).   Pharmacy will continue to follow and monitor warfarin    Thank you for the consult,  Ly Bright, PharmD, BCPS  [id number]    **Note: Consults are reviewed Monday-Friday 7:00am-3:30pm. THE ABOVE RECOMMENDATIONS ARE ONLY SUGGESTED.THE RECOMMENDATIONS SHOULD BE CONSIDERED IN CONJUNCTION WITH ALL PATIENT FACTORS. **

## 2019-03-14 NOTE — SUBJECTIVE & OBJECTIVE
Interval History: Patient seen in dialysis. Discussed MRSA in wound and currently on Vanc. Pending ID consult. Today with leukocytosis.     Review of Systems   Constitutional: Negative for appetite change, chills, fatigue and fever.   HENT: Negative for trouble swallowing.    Respiratory: Positive for cough. Negative for chest tightness, shortness of breath and wheezing.    Cardiovascular: Negative for chest pain, palpitations and leg swelling.   Gastrointestinal: Negative for abdominal pain, constipation, diarrhea and nausea.   Genitourinary: Negative for difficulty urinating, frequency and urgency.        + anuric   Musculoskeletal: Negative for arthralgias and myalgias.   Skin: Negative for rash.        +RLE wound   Neurological: Negative for dizziness, weakness, light-headedness and headaches.   Psychiatric/Behavioral: Negative for sleep disturbance.     Scheduled Meds:   sodium chloride 0.9%   Intravenous Once    sodium chloride 0.9%   Intravenous Once    aspirin  81 mg Oral Daily    atorvastatin  80 mg Oral Daily    bacitracin-polymyxin b   Topical (Top) Daily    ciclopirox   Topical (Top) Daily    clopidogrel  75 mg Oral Daily    fludrocortisone  100 mcg Oral BID    ketorolac 0.5%  1 drop Both Eyes BID    levETIRAcetam  500 mg Oral BID    levoFLOXacin  500 mg Oral Q48H    midodrine  5 mg Oral TID WM    nortriptyline  25 mg Oral with dinner    pantoprazole  40 mg Oral Daily    polyethylene glycol  17 g Oral Daily    prednisoLONE acetate  1 drop Left Eye TID    predniSONE  20 mg Oral Daily    pregabalin  100 mg Oral TID    senna-docusate 8.6-50 mg  1 tablet Oral BID    sevelamer carbonate  800 mg Oral TID WM    vancomycin (VANCOCIN) IVPB  1,000 mg Intravenous Every Tues, Thurs, Sat    warfarin  5 mg Oral Daily     Continuous Infusions:  PRN Meds:.sodium chloride 0.9%, acetaminophen, benzonatate, bisacodyl, dextrose 50%, dextrose 50%, glucagon (human recombinant), glucose, glucose, insulin  aspart U-100, midodrine, ondansetron, promethazine, ramelteon, sodium chloride 0.9%, sodium chloride 0.9%, tiZANidine    Objective:     Vital Signs (Most Recent):  Temp: 97.6 °F (36.4 °C) (03/14/19 0850)  Pulse: 71 (03/14/19 1100)  Resp: (!) 21 (03/14/19 1100)  BP: 127/68 (03/14/19 1100)  SpO2: 98 % (03/14/19 0850) Vital Signs (24h Range):  Temp:  [97.6 °F (36.4 °C)-97.7 °F (36.5 °C)] 97.6 °F (36.4 °C)  Pulse:  [56-77] 71  Resp:  [16-24] 21  SpO2:  [95 %-98 %] 98 %  BP: ()/(53-90) 127/68     Weight: 119.3 kg (263 lb)  Body mass index is 39.99 kg/m².    Intake/Output Summary (Last 24 hours) at 3/14/2019 1127  Last data filed at 3/13/2019 1815  Gross per 24 hour   Intake 780 ml   Output 3600 ml   Net -2820 ml      Physical Exam   Constitutional: She is oriented to person, place, and time. She appears well-developed and well-nourished. No distress.   Obese   Cardiovascular: Normal rate, regular rhythm and normal heart sounds. Exam reveals no gallop and no friction rub.   No murmur heard.  + left chest pacemaker   Pulmonary/Chest: Effort normal and breath sounds normal. No respiratory distress. She has no wheezes. She has no rales.   +O2 use via NC   Abdominal: Soft. Bowel sounds are normal. She exhibits no distension. There is no tenderness.   Musculoskeletal: Normal range of motion. She exhibits edema. She exhibits no tenderness.   1+ pedal edema; RLE dressing in place c/d/i   Neurological: She is alert and oriented to person, place, and time.   Skin: Skin is warm and dry. No rash noted. She is not diaphoretic. No cyanosis. Nails show no clubbing.   Psychiatric: She has a normal mood and affect. Her behavior is normal.       Significant Labs:   BMP:   Recent Labs   Lab 03/14/19  0741   GLU 78      K 4.6   CL 98   CO2 26   BUN 39*   CREATININE 5.7*   CALCIUM 8.7     CBC:   Recent Labs   Lab 03/13/19  0651 03/14/19  0741   WBC 10.18 17.41*   HGB 12.4 13.7   HCT 40.1 45.1    155     POCT Glucose   Date  Value Ref Range Status   03/13/2019 196 (H) 70 - 110 mg/dL Final   03/13/2019 136 (H) 70 - 110 mg/dL Final   03/13/2019 157 (H) 70 - 110 mg/dL Final   03/13/2019 90 70 - 110 mg/dL Final   03/12/2019 324 (H) 70 - 110 mg/dL Final   03/12/2019 104 70 - 110 mg/dL Final   03/12/2019 100 70 - 110 mg/dL Final   03/11/2019 227 (H) 70 - 110 mg/dL Final   03/11/2019 155 (H) 70 - 110 mg/dL Final   03/11/2019 154 (H) 70 - 110 mg/dL Final     Lab Results   Component Value Date    INR 1.5 (H) 03/14/2019    INR 1.4 (H) 03/13/2019    INR 2.0 (H) 03/12/2019       Magnesium: No results for input(s): MG in the last 48 hours.    Significant Imaging: I have reviewed all pertinent imaging results/findings within the past 24 hours.

## 2019-03-14 NOTE — PROGRESS NOTES
IHD completed, blood returned. Pt alert & stable. Thrill/bruit noted, hemostasis 10 minutes, dry occlusive dressing applied to sites. Duration `4.0hrs, Qb 400ml/min, Qd 800ml/min, UFG 3.0L. Report called. Pt transported to [facility] via stretcher with tele.

## 2019-03-14 NOTE — PLAN OF CARE
Problem: Fluid Volume Excess (Chronic Kidney Disease)  Goal: Fluid Balance  Outcome: Ongoing (interventions implemented as appropriate)  UFG 3.1L

## 2019-03-14 NOTE — HPI
This is a 57 y/o female with hx of NIDDM, ESRD on hemodialysis, pulmonary sarcoidosis, seizure disorder, PAF, hyperlipidemia, chronic combined systolic and diastolic heart failure, pulmonary hypertension and severe obesity who presents with shortness of breath for the past 2 weeks. Patient notes hypotension for the last 2 weeks associated with dialysis.  She does have a history of chronic hypotension for which she takes midodrine but notes this is worse than her normal.  Patient also notes worsening shortness of breath especially on exertion.  She chronically wears oxygen about 3-5 L as needed for her pulmonary hypertension.  Patient notes becoming more short of breath as well as a dry cough.  She also notes some chills.  She denies any fevers.     In the ED, patient was found to have a left lower lobe pneumonia on CT scan.  Her lactic acid was also mildly elevated at 2.7.  Her BNP was elevated at 705.  Her troponin was also elevated at 0.173.  Patient was given a dose of Levaquin and was admitted for pneumonia.     Patient noted to have a RLE wound. Has been following with Lilian Haywood as an outpatient in wound care. Culture was obtained on 3/8 now positive for MRSA. Pt now on vancomycin.     Pt denies fevers or chills. Seen at dialysis. No complaints.

## 2019-03-14 NOTE — ASSESSMENT & PLAN NOTE
- tele monitoring  - continue Coumadin  - daily PT/INR  - appreciate pharmacist assistance with dosing  - subtherapeutic today but increasing

## 2019-03-14 NOTE — PROGRESS NOTES
Dialysis tx completed. 3 hours. 3 liters removed. Hemostasis achieved. Gauze and tape to sites. Tolerated tx well. Report called to Loni RENTERIA.

## 2019-03-15 NOTE — DISCHARGE SUMMARY
[facility] Hospital Medicine  Discharge Summary      Patient Name: Sisi Medel  MRN: [id number]  Admission Date: 3/10/2019  Hospital Length of Stay: 5 days  Discharge Date and Time:  03/15/2019 10:54 AM  Attending Physician: BOB Thompson MD   Discharging Provider: Aleyda Linn NP  Primary Care Provider: Carlos A Caputo MD  [place] [facility] Medicine Team: [facility] HOSP MED O Aleyda Linn NP    HPI:   Sisi Medel is a 58 y.o. female with hx of NIDDM, ESRD on hemodialysis, pulmonary sarcoidosis, seizure disorder, PAF, hyperlipidemia, chronic combined systolic and diastolic heart failure, pulmonary hypertension and severe obesity who presents with shortness of breath for the last 2 weeks.  Patient notes hypotension for the last 2 weeks associated with dialysis.  She does have a history of chronic hypotension for which she takes midodrine but notes this is worse than her normal.  Patient also notes worsening shortness of breath especially on exertion.  She chronically wears oxygen about 3-5 L as needed for her pulmonary hypertension.  Patient notes becoming more short of breath as well as a dry cough.  She also notes some chills.  She denies any fevers.  She denies any sick contacts although she is a dialysis patient.  Patient denies any vomiting but has had some nausea.  She also notes some lower abdominal pain and constipation.  Patient denies any diarrhea.  She does not make any urine.  Patient notes dizziness, lightheadedness and near-syncope associated with her low blood pressure.  She notes she almost fainted yesterday.  Patient notes they were unable to complete dialysis yesterday due to dizziness and hypotension.    * No surgery found *      Hospital Course:   Patient admitted on 3/10/19 with Pneumonia. In the ED, patient was found to have a left lower lobe pneumonia on CT scan.  Her lactic acid was also mildly elevated at 2.7.  Her BNP was elevated at 705.  Her troponin  was also elevated at 0.173.  Patient was given a dose of Levaquin and admission was requested for pneumonia.  Patient receiving HD inpatient and required additional dialysis run for fluid removal.  With improved breathing Levaquin transitioned to oral. Patient with MRSA in lower extremity ulceration. Started on Vanc consulted ID. ID recommended continue Levaquin for total of 7 days along with doxy for 7 days for MRSA of leg wound. Nephrology felt that patient was euvolemic today and can transition back to outpatient HD chair. Patient instructed on discharge and to follow-up with PCP and wound care nurse.      Consults:   Consults (From admission, onward)        Status Ordering Provider     [facility] Medicine PharmD Consult  Once     Provider:  (Not yet assigned)    Acknowledged JUVENCIO PERALTA     Inpatient consult to Infectious Diseases  Once     Provider:  (Not yet assigned)    Completed ZAHRAA PEÑA     Inpatient consult to Nephrology  Once     Provider:  (Not yet assigned)    Completed JUVENCIO PERALTA     Nutrition Services Referral  Once     Provider:  (Not yet assigned)    Completed JUVENCIO PERALTA          * Healthcare-associated pneumonia    - given Levaquin in ED, will continue--transitioned to oral with improvement in breathing  - check blood cultures x2, respiratory culture  - check rapid flu -negative  - tele monitoring  - lactic acid elevated, resolved  - continue home O2     Skin ulcer    - wound care consulted  - continue current recommendations  - cx + MRSA  - started on vanc after dialysis  - consulted ID     Diabetes mellitus type II, controlled    - hold home meds  - ISS  - bedside glucose monitoring  - diabetic diet  - check A1c- 6.2     End stage renal failure on dialysis    - consult Nephrology, appreciate recs     Paroxysmal atrial fibrillation    - tele monitoring  - continue Coumadin  - daily PT/INR  - appreciate pharmacist assistance with dosing  - subtherapeutic today but increasing      Secondary adrenal insufficiency    - Chronic  - continue Florinef, midodrine  - Midodrine 15mg 30min prior to dialysis     Chronic respiratory failure with hypoxia    - due to pulmonary sarcoidosis  - continue supplemental oxygen     Chronic combined systolic and diastolic heart failure    - does not appear in acute exacerbation  - check 2D echo- PA is 56 d/w HTS likely related to increase volume, continue to maximize BP control with midodrine for dialysis, suspected for PA to return to baseline once fluid is removed. Patient to f/u in clinic  - on HD     Hyperlipidemia    - chronic and stable  - continue statin     Pulmonary hypertension    - follows with pulmonology here  - continue home O2     Morbid obesity with BMI of 40.0-44.9, adult    - counseled on diet and exercise     Seizure disorder    - chronic and stable  - continue Keppra     Pulmonary sarcoidosis    - continue steroids  - continue home oxygen       Final Active Diagnoses:    Diagnosis Date Noted POA    PRINCIPAL PROBLEM:  Healthcare-associated pneumonia [J18.9] 03/12/2019 Unknown    Skin ulcer [L98.499] 03/11/2019 Yes    Diabetes mellitus type II, controlled [E11.9] 12/08/2017 Yes    End stage renal failure on dialysis [N18.6, Z99.2] 10/12/2017 Not Applicable     Chronic    Paroxysmal atrial fibrillation [I48.0] 06/12/2017 Yes    Secondary adrenal insufficiency [E27.49] 02/04/2017 Yes    Chronic respiratory failure with hypoxia [J96.11] 11/17/2016 Yes    Hyperlipidemia [E78.5] 03/07/2016 Yes     Chronic    Pulmonary hypertension [I27.20] 07/24/2014 Yes    Morbid obesity with BMI of 40.0-44.9, adult [E66.01, Z68.41] 08/15/2013 Not Applicable     Chronic    Chronic combined systolic and diastolic heart failure [I50.42] 06/14/2013 Yes     Chronic    Pulmonary sarcoidosis [D86.0] 03/19/2013 Yes     Chronic    Seizure disorder [G40.909] 03/19/2013 Yes     Chronic      Problems Resolved During this Admission:    Diagnosis Date Noted Date  Resolved POA    Pneumonia of left lower lobe due to infectious organism [J18.1] 03/10/2019 03/12/2019 Yes    Leukocytosis [D72.829] 04/07/2015 03/15/2019 Unknown       Discharged Condition: stable    Disposition: Home or Self Care    Follow Up:  Follow-up Information     Carlos A Caputo MD In 2 days.    Specialty:  Internal Medicine  Contact information:  [address] [facility] [address]  [phone]             Dorita Haywood NP Today.    Specialties:  Vascular Surgery, Wound Care  Contact information:  [address] [facility] [address]  [phone]                 Patient Instructions:      Diet diabetic     No driving until:     Notify your health care provider if you experience any of the following:  temperature >100.4     Notify your health care provider if you experience any of the following:  persistent nausea and vomiting or diarrhea     Notify your health care provider if you experience any of the following:  severe uncontrolled pain     Notify your health care provider if you experience any of the following:  redness, tenderness, or signs of infection (pain, swelling, redness, odor or green/yellow discharge around incision site)     Notify your health care provider if you experience any of the following:  difficulty breathing or increased cough     Notify your health care provider if you experience any of the following:  severe persistent headache     Notify your health care provider if you experience any of the following:  persistent dizziness, light-headedness, or visual disturbances     Notify your health care provider if you experience any of the following:  increased confusion or weakness     Change dressing (specify)   Order Comments: Cleanse R lateral lower leg wound with sterile normal saline and apply MediHoney to wound with sensicare skin prep to periwound. Cover with foam dressing. Change daily.     Activity as tolerated       Significant Diagnostic Studies: Labs:   BMP:    Recent Labs   Lab 03/14/19  0741 03/15/19  0722   GLU 78 73    135*   K 4.6 4.3   CL 98 100   CO2 26 20*   BUN 39* 37*   CREATININE 5.7* 5.1*   CALCIUM 8.7 8.5*    and CBC   Recent Labs   Lab 03/14/19  0741 03/15/19  0722   WBC 17.41* 11.37   HGB 13.7 13.5   HCT 45.1 46.7    156     .  POCT Glucose   Date Value Ref Range Status   03/15/2019 72 70 - 110 mg/dL Final   03/14/2019 149 (H) 70 - 110 mg/dL Final   03/14/2019 187 (H) 70 - 110 mg/dL Final   03/14/2019 164 (H) 70 - 110 mg/dL Final   03/14/2019 104 70 - 110 mg/dL Final   03/13/2019 196 (H) 70 - 110 mg/dL Final   03/13/2019 136 (H) 70 - 110 mg/dL Final   03/13/2019 157 (H) 70 - 110 mg/dL Final   03/13/2019 90 70 - 110 mg/dL Final   03/12/2019 324 (H) 70 - 110 mg/dL Final   03/12/2019 104 70 - 110 mg/dL Final     Lab Results   Component Value Date    INR 1.7 (H) 03/15/2019    INR 1.5 (H) 03/14/2019    INR 1.4 (H) 03/13/2019         Pending Diagnostic Studies:     None         Medications:  Reconciled Home Medications:      Medication List      START taking these medications    doxycycline 100 MG tablet  Commonly known as:  VIBRA-TABS  Take 1 tablet (100 mg total) by mouth every 12 (twelve) hours. for 6 days     levoFLOXacin 500 MG tablet  Commonly known as:  LEVAQUIN  Take 1 tablet (500 mg total) by mouth every 48 hours. Take after dialysis on dialysis days until gone for 3 doses        CHANGE how you take these medications    warfarin 5 MG tablet  Commonly known as:  COUMADIN  TAKE 1 TABLET BY MOUTH EVERY DAY EXCEPT TH 1AND 1/2 TABLETS ON MONDAY AND FRIDAY OR AS DIRECTED  What changed:  Another medication with the same name was removed. Continue taking this medication, and follow the directions you see here.        CONTINUE taking these medications    aspirin 81 MG EC tablet  Commonly known as:  ECOTRIN  Take 81 mg by mouth once daily.     atorvastatin 80 MG tablet  Commonly known as:  LIPITOR  TAKE 1 TABLET BY MOUTH EVERY EVENING      betamethasone dipropionate 0.05 % ointment  Commonly known as:  DIPROLENE  AAA bid right lower leg     blood sugar diagnostic Strp  1 each by Misc.(Non-Drug; Combo Route) route once daily.     blood-glucose meter kit  Use as instructed     ciclopirox 8 % Soln  Commonly known as:  PENLAC  Apply daily to affected nail. Must remove and restart weekly     clopidogrel 75 mg tablet  Commonly known as:  PLAVIX  Take 1 tablet (75 mg total) by mouth once daily.     fludrocortisone 0.1 mg Tab  Commonly known as:  FLORINEF  Take 100 mcg by mouth 2 (two) times daily.     ketorolac 0.5% 0.5 % Drop  Commonly known as:  ACULAR  instill one drop into both eyes every morning     lancets Misc  1 each by Misc.(Non-Drug; Combo Route) route once daily.     lancing device Misc  1 Device by Misc.(Non-Drug; Combo Route) route 2 (two) times daily with meals.     levETIRAcetam 500 MG Tab  Commonly known as:  KEPPRA  Take 1 tablet (500 mg total) by mouth 2 (two) times daily.     midodrine 5 MG Tab  Commonly known as:  PROAMATINE     nortriptyline 25 MG capsule  Commonly known as:  PAMELOR  TK 1 C PO QPM     omeprazole 20 MG capsule  Commonly known as:  PRILOSEC  TAKE 1 CAPSULE BY MOUTH EVERY DAY     prednisoLONE acetate 1 % Drps  Commonly known as:  PRED FORTE  INSTILL ONE DROP INTO  LEFT EYE THREE TIMES A DAY     predniSONE 20 MG tablet  Commonly known as:  DELTASONE  Take 1 tablet (20 mg total) by mouth once daily.     pregabalin 100 MG capsule  Commonly known as:  LYRICA  Take 1 capsule (100 mg total) by mouth 3 (three) times daily.     DENISE-LINDA 0.8 mg Tab  Generic drug:  B complex-vitamin C-folic acid  TK 1 T PO QD     RENVELA 800 mg Tab  Generic drug:  sevelamer carbonate  TAKE 1 TABLET BY MOUTH THREE TIMES DAILY WITH MEALS     sodium polystyrene 15 gram/60 mL Susp  Commonly known as:  KAYEXALATE  Take 60 mLs (15 g total) by mouth daily as needed. Take when directed by your doctor     terbinafine HCl 250 mg tablet  Commonly known as:   LAMISIL  Take 250 mg by mouth once daily.     tiZANidine 4 MG tablet  Commonly known as:  ZANAFLEX  TAKE 1 TABLET BY MOUTH EVERY DAY AS NEEDED FOR MUSCLE SPASMS     TRADJENTA 5 mg Tab tablet  Generic drug:  linagliptin  TAKE 1 TABLET(5 MG) BY MOUTH EVERY DAY        STOP taking these medications    BOOSTRIX TDAP 2.5-8-5 Lf-mcg-Lf/0.5mL Syrg injection  Generic drug:  diphth,pertus(acell),tetanus     PREVNAR 13 (PF) 0.5 mL Syrg  Generic drug:  pneumoc 13-chandana conj-dip cr(PF)            Indwelling Lines/Drains at time of discharge:   Lines/Drains/Airways     Drain                 Hemodialysis AV Graft Left upper arm -- days                Time spent on the discharge of patient: 40 minutes  Patient was seen and examined on the date of discharge and determined to be suitable for discharge.         Aleyda Linn NP  Department of Hospital Medicine  [facility]

## 2019-03-15 NOTE — PLAN OF CARE
Problem: Adult Inpatient Plan of Care  Goal: Plan of Care Review  Outcome: Ongoing (interventions implemented as appropriate)   03/15/19 0707   Plan of Care Review   Plan of Care Reviewed With patient   Pt remains free from falls and injuries during shift. Awake, alert, and oriented x 4. Vital signs stable. Oxygen levels WNL. Denied pain. No signs of acute distress noted at this time. Bed in lowest position, wheels locked, and call light in reach. Will continue to monitor. Safety maintained.

## 2019-03-15 NOTE — TELEPHONE ENCOUNTER
----- Message from Arabella James sent at 3/15/2019  3:10 PM CDT -----  Contact: pt   Patient Requesting Sooner Appointment.     Reason for sooner appt.: pt is calling to schedule an ultrasound and a follow up appt   When is the first available appointment? N/A   Communication Preference: can you please call pt at [phone]  Additional Information: none    ANAHI

## 2019-03-15 NOTE — TELEPHONE ENCOUNTER
Contacted patient in response to message asking to reschedule appts with vascular lab and with Dr. Villafana. Appts rescheduled, pt verified. Appt letter placed in mail.

## 2019-03-20 NOTE — PROGRESS NOTES
Admitted 3/10-3/15 with c/o SOB x2 weeks and diagnosed with PNA. ID recommended continue Levaquin for total of 7 days along with doxy for 7 days for MRSA of leg wound. Calendar updated with inpatient warfarin doses/INRs. Looks like d/c antibiotics should be complete now but need to check INR asap.    UPDATE - patient reports she was advised to take doses higher than usual by hospital team (likely 2/2 to low INR, however I would not have made this recommendation since patient on two antibiotics with possible DDI to raise INR). Patient unable to get INR checked until 3/22 so will lower doses for today and tomorrow and f/u 3/22.

## 2019-03-24 PROBLEM — I50.43 ACUTE ON CHRONIC COMBINED SYSTOLIC (CONGESTIVE) AND DIASTOLIC (CONGESTIVE) HEART FAILURE: Status: ACTIVE | Noted: 2019-01-01

## 2019-03-25 PROBLEM — I95.9 HYPOTENSION: Status: ACTIVE | Noted: 2019-01-01

## 2019-03-26 NOTE — TELEPHONE ENCOUNTER
Patient with recent hospital admission for acute exacerbation of chronic CHF  Recommended for primary care f/u within 2 weeks  Please contact to schedule for hospital discharge follow up - with priority clinic if possible given medical complexity, otherwise with me or with NP

## 2019-03-27 NOTE — PROGRESS NOTES
"Sisi Medel  3/27/2019    HPI:  Patient is a 55 y.o.  female with a h/o HTN, HLD, CHF (EF 20%), COPD on home O2, paroxysmal Afib (on Coumadin), seizure disorder, ESRD on HD TThS via LUE AVG (placed 2/3/16) who presents to clinic today for follow up from PTA dilation of LUE AVG in late January 2019.    Patient states that she is able to complete dialysis. Per patient, dialysis nurses are saying her LUE AVG "doesn't sound right." Was [place] [facility] [place] spent 3 days in the hospital d/t CHF exacerbation.    Patient's lower extremity pain has improved since her last visit.     She's noticed hyperpigmentation and ulceration as well on the right lower calf. The ulcer is not healing. She has been undergoing RLE venous ulcer care in our wound clinic for the past several weeks.      Patient takes ASA, statin, Plavix, coumadin.    S/p   2/3/16: LUE AVG creation (Dr Villafana)  6/21/16: Percutaneous mechanical thrombectomy w [place] Angiojet AVX; 4fr OTW embolectomy of arterial inflow   PTA outflow stenosis with a 7x40 mm balloon  10/12/16: fistulogram (75% stenosis noted), left upper extremity AV graft PTA venous outflow with resolution of stenosis noted  2/2017 Declot and venous outflow PTA and stent with 8 x 50 VIABAHN  5/15/2017: PTA outflow stenosis (8x60 mustang); Stent outflow stenosis (8x50 Viabahn)  10/12/17: PTA LUE AV graft (brachial vein) x2: (7x60 [place]); (8x40 [place])   01/10/18: PTA outflow stensois (8x40 [place]) and (9x40 [place])   2/2/18: PTA arterial inflow (7 x 40 [place])  8/2018: 1.  PTA outflow stenosis 8 x 40 [place]; PTA outflow 10 x 40 [place] balloon.  1/2019: PTA LUE AVG outflow 9 x 60 [employer] Drug Coated Balloon and 8 x 40 [place]    No MI/stroke  Tobacco use: Former smoker     Past Medical History   Diagnosis Date    Anemia in ESRD (end-stage renal disease) 3/7/2016    Cervical radiculopathy 7/20/2015    Chronic combined systolic and diastolic heart " failure 2013     EF 20% , improved with Medical therapy, negative PET     Chronic respiratory failure with hypoxia 2013     On home oxygen at 2-5 liters    Chronic rhinitis 10/2/2013    DDD (degenerative disc disease), lumbar 2015    ESRD on hemodialysis 2016    Essential hypertension     Gout     Hyperlipidemia 3/7/2016    Lateral meniscal tear 2016    Lumbar stenosis 2015    Morbid obesity 8/15/2013    Paroxysmal atrial fibrillation 2014     Not on anticoagulation    Peripheral neuropathy 2013    Personal history of fall 2014    Personal history of gastric ulcer 3/19/2013    Sarcoidosis, lung 2009     Diagnosed in  with ocular manifestation, on 4L home O2 and PO steroids    Secondary pulmonary hypertension 2015    Seizure disorder 3/19/2013    Shingles 2013    Spondylarthrosis 2015     Past Surgical History   Procedure Laterality Date     section, classic      Abdominal surgery      Vascular surgery       Family History   Problem Relation Age of Onset    Kidney failure Mother     Hypertension Mother     Diabetes Mother     Coronary artery disease Father     Hypertension Father     Diabetes Father     Lupus Sister     Diabetes Maternal Grandmother     Asthma Neg Hx     Emphysema Neg Hx     Melanoma Neg Hx     Psoriasis Neg Hx      Social History     Social History    Marital status: Single     Spouse name: N/A    Number of children: N/A    Years of education: N/A     Occupational History    Not on file.     Social History Main Topics    Smoking status: Former Smoker     Packs/day: 0.50     Years: 10.00     Types: Cigarettes     Quit date: 1994    Smokeless tobacco: Never Used    Alcohol use No    Drug use: No    Sexual activity: No     Other Topics Concern    Not on file     Social History Narrative    Lives with boyfriend/partner who helps with her care.     Plans to travel to [place] for  2 weeks in 9/2016     Current Outpatient Prescriptions on File Prior to Visit   Medication Sig    ACZONE 5 % topical gel Apply topically once daily.     allopurinol (ZYLOPRIM) 100 MG tablet Take 1 tablet (100 mg total) by mouth once daily.    amiodarone (PACERONE) 200 MG Tab Take 1 tablet (200 mg total) by mouth every morning.    ammonium lactate (LAC-HYDRIN) 12 % lotion Apply topically as needed (for scars on arms).     aspirin 81 MG Chew Take 81 mg by mouth every morning.    atorvastatin (LIPITOR) 80 MG tablet Take 80 mg by mouth once daily.     capsicum 0.075% (ZOSTRIX) 0.075 % topical cream Apply topically 3 (three) times daily. For neuropathic pain of feet    gabapentin (NEURONTIN) 100 MG capsule TAKE ONE CAPSULE BY MOUTH THREE TIMES DAILY (Patient taking differently: TAKE ONE CAPSULE BY MOUTH THREE TIMES DAILY-noon, evening, bedtime)    levetiracetam (KEPPRA) 500 MG Tab TAKE 1 TABLET BY MOUTH TWICE DAILY.    melatonin 5 mg Tab Take 1 tablet by mouth nightly.    midodrine (PROAMATINE) 10 MG tablet     midodrine (PROAMATINE) 5 MG Tab     NEPHRO-LINDA 0.8 mg Tab TK 1 T PO  QD    ondansetron (ZOFRAN-ODT) 8 MG TbDL Take 1 tablet (8 mg total) by mouth every 8 (eight) hours as needed.    oxycodone-acetaminophen (PERCOCET) 7.5-325 mg per tablet Take 1 tablet by mouth every 4 (four) hours as needed.    predniSONE (DELTASONE) 10 MG tablet TAKE 3 TABLETS BY MOUTH FOR 7 DAYS, THEN 2 TABLETS FOR 7 DAYS, THEN 1 TABLET EVERY DAY AFTER.    RENVELA 800 mg Tab TAKE 1 TABLET BY MOUTH THREE TIMES DAILY WITH MEALS    tizanidine 4 mg Cap Take 4 mg by mouth 2 (two) times daily as needed. (Patient taking differently: Take 4 mg by mouth daily as needed (for muscle spasms.). )    warfarin (COUMADIN) 5 MG tablet TAKE 1 TABLET(5 MG) BY MOUTH DAILY     No current facility-administered medications on file prior to visit.        REVIEW OF SYSTEMS:  General: negative; ENT: negative; Allergy and Immunology: negative;  Hematological and Lymphatic: Negative; Endocrine: negative; Respiratory: no cough, increased shortness of breath this morning, or wheezing; Cardiovascular: no chest pain or dyspnea on exertion; Gastrointestinal: no abdominal pain/back, change in bowel habits, or bloody stools; Genito-Urinary: no dysuria, trouble voiding, or hematuria; Musculoskeletal: see HPI  Neurological: no TIA or stroke symptoms; Psychiatric: no nervousness, anxiety or depression.    PHYSICAL EXAM:   Vitals:    03/27/19 1328   BP: 126/86   Pulse: 69         General appearance:  Alert, well-appearing, and in no distress.  Oriented to person, place, and time   Neurological:  Normal speech, no focal findings noted; CN II - XII grossly intact           Musculoskeletal: Digits/nail without cyanosis/clubbing.  Normal muscle strength/tone.                 Neck: Supple, no significant adenopathy; thyroid is not enlarged                Chest:  Clear to auscultation, symmetric air entry      No use of accessory muscles             Cardiac: Normal rate and regular rhythm, S1 and S2 normal; PMI non-displaced          Abdomen: Soft, non-tender, non-distended, no masses or organomegaly      Extremities:   LUE AVG with palpable thrill, pulsatile, 2+ distal radial pulse;       RLE with hyperpigmentation, induration from distal calf to proximal foot, 2 x 2 cm superficial venous ulceration with mild serous exudate seen on wound care imaging          LAB RESULTS:  Lab Results   Component Value Date    WBC 13.32 (H) 03/26/2019    HGB 11.9 (L) 03/26/2019    HCT 38.5 03/26/2019    MCV 90 03/26/2019     03/26/2019     BMP  Lab Results   Component Value Date     (L) 03/26/2019    K 4.4 03/26/2019    CL 94 (L) 03/26/2019    CO2 24 03/26/2019    BUN 39 (H) 03/26/2019    CREATININE 6.8 (H) 03/26/2019    CALCIUM 7.9 (L) 03/26/2019    ANIONGAP 16 03/26/2019    ESTGFRAFRICA 7.1 (A) 03/26/2019    EGFRNONAA 6.2 (A) 03/26/2019     Lab Results   Component Value Date     HGBA1C 6.2 (H) 03/10/2019       IMAGING:    3/27/2019  Elevated PSV with narrowing at the distal stent with velocity of 320 cm/s. Volume at mid graft measures 1421 mL/min    2/20/2019  Elevated PSV with narrowing in proximal graft with velocity at 543 cm/s. Volume at mid-bicep is 1719 ml/min.      1/4/19 VASU - 1.31 R, 1.31 L; PVR waveforms suggest minimal peripheral arterial occlusive disease    EXAM KANDIS: 03/27/2019 12:58  REF PHYS: DAMIAN MARTINS    Indication:  ESRD.     Finding:  Hemodialysis access graft                                                                                                                                                                                                                                                                                                                                                                                                                                                                                                                                                                                                                                                                                                                                                                       Velocities: in cm./sec  Inflow- 137  arterial anastomosis-548  proximal graft-407  mid graft-209  distal graft-116  venous anastomosis stent:  lyfx=893  mid=85  distal=320  venous outflow stent-  264              volume flow:1421 in ml./min.     Overall Impression:  Color flow duplex exam reveals a patent left upper extremity AV graft. The distal venous anastomosis stent has a visual narrowing at the distal stent portion where PSVs increase from 85 cm/sec to 320 cm/s which is suggestive of a hemodynamically   significant stenosis. There are increased velocities in the proximal graft with a slight narrowing; however, it does not appear to be flow limiting.  Volume flow at mid  graft level measures 1421 ml./min.    IMP/PLAN:  55 y.o. female with HTN, HLD, CHF (EF 20%), COPD on home O2, paroxysmal Afib (on Coumadin), seizure disorder, ESRD on HD TThS via LUE AVG. Continues to have venous ulceration and stigmata of venous stasis RLE.  Her AVG flow volume has decreased from >1700 to 1400 with evidence of recurring stenosis.  She is dialyzing well.  She states her RLE venous ulceration is gradually improving with wound care    1) plan for LUE AVG fistulagram in the coming weeks  2) continue coumadin    Torrey Villafana MD  Vascular & Endovascular Surgery

## 2019-03-28 PROBLEM — I95.1 ORTHOSTATIC HYPOTENSION: Status: ACTIVE | Noted: 2019-01-01

## 2019-03-28 NOTE — ED TRIAGE NOTES
Pt presented to ED for continued weakness, lethargy and HA. Pt was d/c 2 days ago from this facility for same complaint.     LOC: The patient lethargic. Aware of environment, oriented x 3 and speaking appropriately.   APPEARANCE: No acute distress noted.   PSYCHOSOCIAL: Patient is calm and cooperative.   SKIN: The skin is warm, dry. Ulcerations to RLE covered with Mepilex.    RESPIRATORY: Airway is open and patent. Bilateral chest rise and fall. Respirations are spontaneous, even and unlabored. Normal effort and rate noted. No accessory muscle use noted. LLL fine crackles auscultated.   CARDIAC: Patient is in [place]. Denies chest pain at this time. States she had CP earlier today. Not currently SOB but, this is a frequent issue.  ABDOMEN: Pendulous and non tender to palpation. No distention noted. + bs.  URINARY:  Anuric. +thril/bruit to LUE graft  EXTREMITIES: Mild pitting edema to BLE. 2+ PT pulses.  NEUROLOGIC: Eyes open spontaneously. Speech clear. Tolerating saliva secretions well. Able to follow commands, demonstrating ability to actively and appropriately communicate within context of current conversation. Symmetrical facial muscles. Moving all extremities well. Movement is purposeful.   MUSCULOSKELETAL: No obvious deformities noted.

## 2019-03-28 NOTE — ED PROVIDER NOTES
Encounter Date: 3/28/2019    SCRIBE #1 NOTE: I, Cuca Mejia, am scribing for, and in the presence of,  Dr. Dos Santos . I have scribed the entire note.       History     Chief Complaint   Patient presents with    Weakness     Home health RN states BP was 70/40, and HR 40 after dialysis today 1.3L taken off per report. Patient has pacemaker, rate of 70 per EMS. Recently discharged for weakness.      59 yo W with pmhx ESRD-HD TTS via LUE AVG (last dialyzed today), sarcoidosis on 5L O2, combined CHF (EF 35%), Pulm HTN, pAfib on coumadin, seizure disorder, obesity presents with a chief complaint of weakness. She was recently hospitalized for similar symptoms and found to be adrenal insufficient as well as fluid overloaded. She reports moderate symptomatic improvement at day of discharge from the hospital 3/26, but has been persistently weak since yesterday. Patient was able to complete a full round of dialysis today and notes that 1.3L of fluid was taken off. After finishing dialysis she became even more weak. Her home health nurse noted that the patient was hypotensive and bradycardiac at that time. She reports CP prior to arrival that has now resolved. She endorses a non-productive, dry cough. She reports compliance with her medications and fluid restriction diet. Denies difficulty urinating, diarrhea, nausea, or emesis.  She reports that she is at her dry weight. She is fed up that this is her third hospital encounter for similar symptoms.    The history is provided by the patient and medical records.     Review of patient's allergies indicates:   Allergen Reactions    Bactrim [sulfamethoxazole-trimethoprim] Other (See Comments)     Causes renal failure    Nsaids (non-steroidal anti-inflammatory drug) Other (See Comments)     Renal failure     Past Medical History:   Diagnosis Date    Anemia in ESRD (end-stage renal disease) 3/7/2016    Anticoagulant long-term use     Cervical radiculopathy 7/20/2015    CHF  (congestive heart failure)     Chronic combined systolic and diastolic heart failure 2013    EF 20% , improved with Medical therapy, negative PET     Chronic respiratory failure with hypoxia 2013    On home oxygen at 2-5 liters    Closed fracture of proximal end of right fibula 2016    Complications due to renal dialysis device, implant, and graft     DDD (degenerative disc disease), lumbar 2015    Dependence on renal dialysis     Diabetes mellitus type II, controlled 2017    ESRD on hemodialysis 2016    Essential hypertension     Gout     Lateral meniscal tear 2016    Lumbar stenosis 2015    Obesity, Class III, BMI 40-49.9 (morbid obesity)     Pacemaker     Paroxysmal atrial fibrillation 2014    Not on anticoagulation    Peripheral neuropathy 2013    Personal history of gastric ulcer 3/19/2013    Pneumonia of left lower lobe due to infectious organism 3/10/2019    Sarcoidosis, lung 2009    Diagnosed in  with ocular manifestation, on 4L home O2 and PO steroids    Secondary pulmonary hypertension 2015    Seizure disorder 3/19/2013    Shingles 2013    Thyroid disease      Past Surgical History:   Procedure Laterality Date    ABDOMINAL SURGERY      ABLATION N/A 2017    Performed by Bl[facility]imir Adorno MD at [facility] CATH LAB    ANGIOPLASTY Left 1/10/2018    Performed by Torrey Villafana MD at [facility] OR 2ND FLR    ANGIOPLASTY-PERCUTANEOUS TRANSLUMINAL (PTA) Left 2017    Performed by Tor[facility]ana MD at [facility] OR 2ND FLR    ANGIOPLASTY-PERCUTANEOUS TRANSLUMINAL (PTA) Left 10/12/2016    Performed by Tor[facility]afana MD at [facility] OR 2ND FLR    CARDIAC PACEMAKER PLACEMENT       SECTION      x2    DECLOT GRAFT PERCUTANEOUS Left 2017    Performed by Tor[facility]afana MD at [facility] OR 2ND FLR    DECLOT-GRAFT Left 2016    Performed by Harjit Starr MD at [facility] CATH LAB    DECLOT-GRAFT Left  6/20/2016    [facility] at [facility] CATH LAB    ECHOCARDIOGRAM-TRANSESOPHAGEAL N/A 6/27/2013    Performed by Delmy Surgeon at [facility] DELMY    fistulogram Left 1/10/2018    Performed by [facility] at [facility] OR 2ND FLR    FISTULOGRAM Left 9/13/2017    Performed by [facility] CATH LAB    FISTULOGRAM Left 10/12/2016    Performed by [facility] at [facility] OR 2ND FLR    FISTULOGRAM Left 6/21/2016    Performed by [facility]jit Starr [facility] at [facility] CATH LAB    Fistulogram, LUE AVG, possible intervention Left 1/30/2019    Performed by [facility] CATH LAB    INJECTION-STEROID-EPIDURAL-TRANSFORAMINAL Right 7/20/2015    Performed by [facility]rez [facility] at [facility] MGT    INJECTION-STEROID-EPIDURAL-TRANSFORAMINAL Right 5/21/2015    Per[facility] by [facility] at [facility] MGT    JSZRQVUND-TFQDC-XJCPODYODNBII / Left upper AV graft. Left 2/3/2016    Performed by [facility] at [facility] OR 2ND FLR    OZQUOVUOL-TRKEFYIFL-AIIJOKYENAUWP N/A 1/11/2017    Performed by Bladimir Adorno MD at [facility] CATH LAB    OTHER SURGICAL HISTORY      loop recorder implant    PLACEMENT-STENT Left 2/7/2017    Performed by [facility] at [facility] OR 2ND FLR    PTA, AV FISTULA N/A 1/30/2019    Performed by [facility] at [facility] CATH LAB    stent to fistula      TRANSESOPHAGEAL ECHOCARDIOGRAM (JARAD) N/A 6/27/2016    Performed by Sourav Machuca MD at [facility] CATH LAB    ULTRASOUND, UPPER GI TRACT, ENDOSCOPIC N/A 1/9/2014    Performed by Stewart Burgess MD at [facility] ENDO (2ND FLR)    VASCULAR SURGERY      fistula to L upper arm      Family History   Problem Relation Age of Onset    Kidney failure Mother     Hypertension Mother     Diabetes Mother     Coronary artery disease Father     Hypertension Father     Diabetes Father     Lupus Sister     Diabetes Maternal Grandmother     Colon cancer Neg Hx     Ovarian cancer Neg Hx     Breast cancer  Neg Hx      Social History     Tobacco Use    Smoking status: Former Smoker     Packs/day: 0.50     Years: 10.00     Pack years: 5.00     Types: Cigarettes     Last attempt to quit: 1994     Years since quittin.9    Smokeless tobacco: Never Used   Substance Use Topics    Alcohol use: No     Alcohol/week: 0.0 oz     Comment: rarely    Drug use: No     Review of Systems   Constitutional: Negative for fever.   HENT: Negative for nosebleeds.    Eyes: Negative for visual disturbance.   Respiratory: Positive for cough.    Cardiovascular: Negative for chest pain.   Gastrointestinal: Negative for diarrhea, nausea and vomiting.   Genitourinary: Negative for difficulty urinating.   Musculoskeletal: Negative for gait problem.   Skin: Negative for wound.   Neurological: Positive for dizziness and weakness.   Psychiatric/Behavioral: Negative for confusion.       Physical Exam     Initial Vitals [19 1504]   BP Pulse Resp Temp SpO2   131/66 72 18 96.7 °F (35.9 °C) 100 %      MAP       --         Physical Exam    Nursing note and vitals reviewed.  Constitutional: She is not diaphoretic. No distress.   Deconditioned.    HENT:   Head: Normocephalic and atraumatic.   Mouth/Throat: Oropharynx is clear and moist.   Eyes: EOM are normal. Pupils are equal, round, and reactive to light. Right eye exhibits no discharge. Left eye exhibits no discharge. No scleral icterus.   Neck: Normal range of motion. Neck supple. JVD present.   Cardiovascular: Normal rate, regular rhythm, normal heart sounds and intact distal pulses. Exam reveals no gallop and no friction rub.    No murmur heard.  Pulmonary/Chest: No respiratory distress. She has no wheezes. She has no rhonchi. She has no rales. She exhibits no tenderness.   Decreased work of breathing on 4.5 L via NC   Abdominal: Soft. Bowel sounds are normal. She exhibits no distension and no mass. There is no tenderness. There is no rebound and no guarding.   Musculoskeletal: Normal  range of motion. She exhibits edema. She exhibits no tenderness.   Scant edema to ankle bilaterally. Lateral aspect of right distal calf with 1 cm ulcer with fibrinous base, no surrounding fluctuance or erythema.    Lymphadenopathy:     She has no cervical adenopathy.   Neurological: She is alert and oriented to person, place, and time. She has normal strength. No cranial nerve deficit or sensory deficit.   Skin: Skin is warm and dry. Capillary refill takes more than 3 seconds. No rash noted.   Psychiatric: She has a normal mood and affect.         ED Course   Procedures  Labs Reviewed   CBC W/ AUTO DIFFERENTIAL - Abnormal; Notable for the following components:       Result Value    MCHC 29.4 (*)     RDW 18.9 (*)     Immature Granulocytes 1.1 (*)     Gran # (ANC) 10.8 (*)     Immature Grans (Abs) 0.13 (*)     Lymph # 0.5 (*)     Gran% 88.3 (*)     Lymph% 4.3 (*)     All other components within normal limits   COMPREHENSIVE METABOLIC PANEL - Abnormal; Notable for the following components:    Sodium 128 (*)     Chloride 91 (*)     CO2 17 (*)     Glucose 191 (*)     Creatinine 3.6 (*)     Calcium 7.8 (*)     Total Bilirubin 1.3 (*)     Anion Gap 20 (*)     eGFR if  15.4 (*)     eGFR if non  13.3 (*)     All other components within normal limits   PROTIME-INR - Abnormal; Notable for the following components:    Prothrombin Time 25.2 (*)     INR 2.6 (*)     All other components within normal limits   PROCALCITONIN - Abnormal; Notable for the following components:    Procalcitonin 0.62 (*)     All other components within normal limits   LACTIC ACID, PLASMA - Abnormal; Notable for the following components:    Lactate (Lactic Acid) 4.8 (*)     All other components within normal limits   TROPONIN I - Abnormal; Notable for the following components:    Troponin I 0.120 (*)     All other components within normal limits   CULTURE, BLOOD   CULTURE, BLOOD   MAGNESIUM   B-TYPE NATRIURETIC PEPTIDE      EKG Readings: (Independently Interpreted)   Rhythm: Paced Rhythm. Heart Rate: 56. ST Segments: Normal ST Segments. T Waves Flipped: I and AVL. Axis: Left Axis Deviation. Other Impression: Atrial sensed, ventricular paced rhythm with PVCs       Imaging Results          X-Ray Chest AP Portable (Final result)  Result time 03/28/19 17:41:53    Final result by Jeancarlos Kohler MD (03/28/19 17:41:53)                 Impression:      Cardiac device without detrimental change or radiographic acute intrathoracic process seen on this limited single view.      Electronically signed by: Jeancarlos Kohler MD  Date:    03/28/2019  Time:    17:41             Narrative:    EXAMINATION:  XR CHEST AP PORTABLE    CLINICAL HISTORY:  hypotension;    TECHNIQUE:  Single frontal view of the chest was performed.    COMPARISON:  Chest radiograph 03/24/2019    FINDINGS:  No detrimental change.  Monitoring leads overlie the chest.  Resolution is limited by body habitus with underpenetration.    Right chest dual lead cardiac device is unchanged.  Cardiomediastinal silhouette remains midline and stable without convincing evidence of failure, noting overall improved aeration of both lungs from 03/24/2019 exam.  No large pleural effusion or pneumothorax.  No definite new focal opacity.  No acute osseous process seen.  PA and lateral views can be obtained.                              X-Rays:   Independently Interpreted Readings:   Chest X-Ray: Cardiomegaly present.  Increased vascular markings consistent with CHF are present. There is a pacemaker present in the left upper chest.     Medical Decision Making:   History:   I obtained history from: someone other than patient.  Old Medical Records: I decided to obtain old medical records.  Initial Assessment:   57 yo W with pmhx ESRD-HD TTS via LUE AVG (last dialyzed today), sarcoidosis on 5L O2, combined CHF (EF 35%), Pulm HTN, pAfib on coumadin, seizure disorder, obesity presents with a chief complaint  of weakness.  Differential Diagnosis:   My differential diagnosis includes, but is not limited to:  heart failure exacerbation, anemia, ACS, electrolyte abnormalities, infectious issues, hypervolemia, adrenal insufficiency, arrhythmia.  Clinical Tests:   Lab Tests: Ordered and Reviewed  Radiological Study: Ordered and Reviewed  Medical Tests: Reviewed and Ordered  ED Management:  Patient normotensive at this time.  Will continue to monitor closely.  Will obtain labs.    Reassessment:  White blood cell count 12.2.  Hemoglobin at baseline.  Patient is hyponatremic at 128.  Acidotic with a bicarb of 17.  Increased anion gap of 20. Glucose 191. CXR with persistent fluid overload, cardiomegaly.    Reassessment: /66. Lactic acid elevated at 4.8. INR therapeutic at 2.6. Procalcitonin elevated at 0.62. Trop elevated at 0.12, but improved from previous.  I am uncertain the etiology of the lactic acidosis.  Bedside ultrasound performed myself is negative for pericardial effusion, there is a decreased EF.  Patient without any abdominal tenderness on exam.  Uncertain whether this is due to bacteremia, we obtained cultures and will treat with vanc and Zosyn. MICU consulted. Pt will require admission.  At this time, my shift is coming to a close and the patient was signed out to incoming MD.            Maryibe Attestation:   Scribe #1: I performed the above scribed service and the documentation accurately describes the services I performed. I attest to the accuracy of the note.    Attending Attestation:           Physician Attestation for Scribe:      Comments: I, Dr. Terence Dos Santos, personally performed the services described in this documentation. All medical record entries made by the scribe were at my direction and in my presence.  I have reviewed the chart and agree that the record reflects my personal performance and is accurate and complete. Terence Dos Santos MD.  9:47 PM 03/28/2019                 Clinical Impression:        ICD-10-CM ICD-9-CM   1. Hyponatremia E87.1 276.1   2. Lactic acidosis E87.2 276.2         Disposition:   Disposition: Admitted                        Terence Dos Santos MD  03/28/19 [id number]

## 2019-03-28 NOTE — TELEPHONE ENCOUNTER
Reason for Disposition   Systolic BP < 90 and feeling dizzy, lightheaded, or weak    Protocols used: LOW BLOOD PRESSURE-A-OH    Spoke with Ms. SANTO Frazier, RN from [facility]. She states she is in the process of admitting the patient into home health. Ms. Medel' blood pressure is 70/48, pulses are between 42-49, and patient is weak. Advised caller to call 911 per triage protocol.

## 2019-03-29 PROBLEM — T82.868D AV FISTULA THROMBOSIS, SUBSEQUENT ENCOUNTER: Status: ACTIVE | Noted: 2019-01-01

## 2019-03-29 PROBLEM — K21.9 GERD (GASTROESOPHAGEAL REFLUX DISEASE): Chronic | Status: ACTIVE | Noted: 2017-01-20

## 2019-03-29 PROBLEM — I95.0 IDIOPATHIC HYPOTENSION: Status: ACTIVE | Noted: 2019-01-01

## 2019-03-29 PROBLEM — E87.20 LACTIC ACIDEMIA: Status: ACTIVE | Noted: 2019-01-01

## 2019-03-29 NOTE — HPI
Reason for Consult: Orthostatic hypotension; concern for adrenal insufficiency    HPI:   Patient is a 58 y.o. female with a diagnosis of anuric ESRD on three times weekly HD, central adrenal insufficiency secondary to chronic corticosteroid use for treatment of pulmonary sarcoidosis, chronic combined heart failure, obesity, pulmonary hypertension, and chronic intermittent hypotension worse during dialysis sessions. She presented for worsening weakness and orthostatic presyncope, which occurred after dialysis. We saw her for the same issue a few days ago, and she is now readmitted with recurrence of hypotension. She was discharged on a doubled dose of prednisone for the next few days. She additionally takes fludrocortisone 100 mcg daily and midodrine 15 mg daily prior to dialysis sessions. We are consulted to evaluate for adrenal insufficiency as the cause for intermittent hypotension.

## 2019-03-29 NOTE — ED NOTES
Spoke with the critical care team reguarding inability to collect additional labs and requesting better iv access for patient. They report they will look into it. No further orders voiced.

## 2019-03-29 NOTE — ASSESSMENT & PLAN NOTE
I do not think the hypotension is related to adrenal insufficiency. She is on a much higher dose of steroids than what she requires, so any residual hypotension must be related to another etiology. See above.    Recommend reducing PDN down to 10 mg once daily at discharge, which is her maintenance dose.  Okay to continue fludrocortisone 100 mcg daily, but this isn't necessary from an adrenal standpoint.

## 2019-03-29 NOTE — ASSESSMENT & PLAN NOTE
S/p AVN ablation and permanently paced  -[id number] score >2  -warfarin (COUMADIN) tablet 5 mg po qd  -Consulted pharmacist to assistance with dosing  -not on any rate control agents  -continue to monitor on telemetry  -continue to monitor INR daily  -Maintain K > 4, Mg > 2

## 2019-03-29 NOTE — CONSULTS
[facility]  Critical Care Medicine  Consult Note    Patient Name: Sisi Medel  MRN: [id number]  Admission Date: 3/28/2019  Hospital Length of Stay: 0 days  Code Status: Prior  Attending Physician: Terence Dos Santos MD   Primary Care Provider: Carlos A Caputo MD   Principal Problem: Orthostatic hypotension    Subjective:     HPI:  Ms. Patricio is a pleasant 57 yo lady w/ pulmonary sarcoidosis on 5 L home oxygen, Class 3 HFrEF (3/2019 EF 35%), perm Afib/flutter s/p 6/2017 AVN ablation & PPM, ESRD (TTS), Type 2 DM (3/2019 A1c 6.2%) and recently diagnosed renal insufficiency secondary to chronic steroid therapy (now on pred 20 qd) who presents today c/o orthostatic hypotension.  She was recently admitted from 3/24 - 3/26 where she was having similar complaints despite being on midodrine 15 mg po TID and found to have adrenal insufficiency.  During that admission her prednisone was increased from 10 to 20 mg po qd with improvement in her symptoms.  The day after discharge she reports recurrent orthostatic hypotension and dyspnea on exertion with her activity now limited to less than a half block.  Today her home health nurse and home PT found her SBP to be in the 70s whenever she was standing or active.  On evaluation she reports that her light headedness is not present when she is laying down.  In the ED she was given vanc & zosyn for possible sepsis and ICU consulted for lactic acidosis.    Hospital/ICU Course:  No notes on file    Past Medical History:   Diagnosis Date    Anemia in ESRD (end-stage renal disease) 3/7/2016    Anticoagulant long-term use     Cervical radiculopathy 7/20/2015    CHF (congestive heart failure)     Chronic combined systolic and diastolic heart failure 6/14/2013    EF 20% 2013, improved with Medical therapy, negative PET 2013    Chronic respiratory failure with hypoxia 04/22/2013    On home oxygen at 2-5 liters    Closed fracture of proximal end of right  fibula 2016    Complications due to renal dialysis device, implant, and graft     DDD (degenerative disc disease), lumbar 2015    Dependence on renal dialysis     Diabetes mellitus type II, controlled 2017    ESRD on hemodialysis 2016    Essential hypertension     Gout     Lateral meniscal tear 2016    Lumbar stenosis 2015    Obesity, Class III, BMI 40-49.9 (morbid obesity)     Pacemaker     Paroxysmal atrial fibrillation 2014    Not on anticoagulation    Peripheral neuropathy 2013    Personal history of gastric ulcer 3/19/2013    Pneumonia of left lower lobe due to infectious organism 3/10/2019    Sarcoidosis, lung 2009    Diagnosed in  with ocular manifestation, on 4L home O2 and PO steroids    Secondary pulmonary hypertension 2015    Seizure disorder 3/19/2013    Shingles 2013    Thyroid disease        Past Surgical History:   Procedure Laterality Date    ABDOMINAL SURGERY      ABLATION N/A 2017    Performed by Bl[facility]imir Adorno MD at [facility] CATH LAB    ANGIOPLASTY Left 1/10/2018    Performed by Torrey Villafana MD at [facility] OR 2ND FLR    ANGIOPLASTY-PERCUTANEOUS TRANSLUMINAL (PTA) Left 2017    Performed by Torrey [facility]afana MD at [facility] OR 2ND FLR    ANGIOPLASTY-PERCUTANEOUS TRANSLUMINAL (PTA) Left 10/12/2016    Performed by Torrey [facility]afana MD at [facility] OR 2ND FLR    CARDIAC PACEMAKER PLACEMENT       SECTION      x2    DECLOT GRAFT PERCUTANEOUS Left 2017    Performed by Torrey [facility]afana MD at [facility] OR 2ND FLR    DECLOT-GRAFT Left 2016    Performed by Harjit Starr MD at [facility] CATH LAB    DECLOT-GRAFT Left 2016    Performed by Harjit Starr MD at [facility] CATH LAB    ECHOCARDIOGRAM-TRANSESOPHAGEAL N/A 2013    Performed by Delmy Surgeon at [facility] DELMY    fistulogram Left 1/10/2018    Performed by Torrey Villafana MD at [facility] OR 2ND FLR    FISTULOGRAM Left 2017    Performed by  [facility] at [facility]ATH [facility]    FISTULOGRAM Left 10/12/2016    Performed by [facility] at [facility] 2ND FLR    FISTULOGRAM Left 6/21/2016    Performed by [facility] St[facility] at [facility] CATH LAB    Fistulogram, LUE AVG, possible intervention Left 1/30/2019    Performed by [facility] at [facility] CATH LAB    INJECTION-STEROID-EPIDURAL-TRANSFORAMINAL Right 7/20/2015    Performed by [facility]rez [facility] at [facility] MGT    INJECTION-STEROID-EPIDURAL-TRANSFORAMINAL Right 5/21/2015    [facility]    VGSJFQNMJ-GLVAA-SLWLNWDRBKZNW / Left upper AV graft. Left 2/3/2016    Performed by [facility] at [facility] OR 2ND FLR    ONFAVJEOS-UUGLGFBVY-HYCEZMNWXDTIJ N/A 1/11/2017    Performed by Bl[facility]imir [facility]orno [facility] at [facility] CATH LAB    OTHER SURGICAL HISTORY      loop recorder implant    PLACEMENT-STENT Left 2/7/2017    Performed by [facility] at [facility] OR 2ND FLR    PTA, AV FISTULA N/A 1/30/2019    Performed by [facility] at [facility] CATH LAB    stent to fistula      TRANSESOPHAGEAL ECHOCARDIOGRAM (JARAD) N/A 6/27/2016    Performed by Sourav Machuca MD at [facility] CATH LAB    ULTRASOUND, UPPER GI TRACT, ENDOSCOPIC N/A 1/9/2014    Performed by Stewart Burgess MD at [facility] ENDO (2ND FLR)    VASCULAR SURGERY      fistula to L upper arm        Review of patient's allergies indicates:   Allergen Reactions    Bactrim [sulfamethoxazole-trimethoprim] Other (See Comments)     Causes renal failure    Nsaids (non-steroidal anti-inflammatory drug) Other (See Comments)     Renal failure       Family History     Problem Relation (Age of Onset)    Coronary artery disease Father    Diabetes Mother, Father, Maternal Grandmother    Hypertension Mother, Father    Kidney failure Mother    Lupus Sister        Tobacco Use    Smoking status: Former Smoker     Packs/day: 0.50     Years: 10.00     Pack years: 5.00     Types: Cigarettes     Last attempt to quit:  1994     Years since quittin.9    Smokeless tobacco: Never Used   Substance and Sexual Activity    Alcohol use: No     Alcohol/week: 0.0 oz     Comment: rarely    Drug use: No    Sexual activity: Not Currently      Review of Systems   Constitutional: Positive for fatigue. Negative for chills and fever.   HENT: Negative for ear pain, hearing loss, mouth sores and sore throat.    Eyes: Negative for photophobia, pain and visual disturbance.   Respiratory: Positive for shortness of breath. Negative for apnea, cough and chest tightness.    Cardiovascular: Negative for chest pain, palpitations and leg swelling.   Gastrointestinal: Negative for abdominal distention, abdominal pain, anal bleeding, blood in stool, constipation, diarrhea, nausea, rectal pain and vomiting.   Musculoskeletal: Negative for arthralgias and myalgias.   Skin: Negative for rash and wound.   Neurological: Positive for light-headedness. Negative for dizziness, syncope and numbness.     Objective:     Vital Signs (Most Recent):  Temp: 96.7 °F (35.9 °C) (19 1504)  Pulse: 69 (19)  Resp: 20 (19)  BP: 131/66 (19 1504)  SpO2: 100 % (19) Vital Signs (24h Range):  Temp:  [96.7 °F (35.9 °C)] 96.7 °F (35.9 °C)  Pulse:  [69-72] 69  Resp:  [18-20] 20  SpO2:  [100 %] 100 %  BP: (131)/(66) 131/66   Weight: 114.8 kg (253 lb)  Body mass index is 38.47 kg/m².      Intake/Output Summary (Last 24 hours) at 3/28/2019 2304  Last data filed at 3/28/2019 2244  Gross per 24 hour   Intake 100 ml   Output --   Net 100 ml       Physical Exam   Constitutional: She is oriented to person, place, and time. No distress.   Eyes: Scleral icterus (mild) is present.   Cardiovascular: Regular rhythm and intact distal pulses. Tachycardia present. Exam reveals no gallop and no friction rub.   No murmur heard.  Pulmonary/Chest: No respiratory distress. She has no wheezes. She has rales (Mild in LLL).   Abdominal: Soft. Bowel sounds  are normal. She exhibits no distension. There is no tenderness.   Musculoskeletal:   LUE AVF w/ palpable thrill   Neurological: She is alert and oriented to person, place, and time.       Vents:     Lines/Drains/Airways     Drain                 Hemodialysis AV Graft Left upper arm -- days          Peripheral Intravenous Line                 Peripheral IV - Single Lumen 03/28/19 1825 Right Antecubital less than 1 day              Significant Labs:    CBC/Anemia Profile:  Recent Labs   Lab 03/28/19  1840   WBC 12.17   HGB 14.2   HCT 48.3      MCV 95   RDW 18.9*        Chemistries:  Recent Labs   Lab 03/28/19  1840   *   K 4.1   CL 91*   CO2 17*   BUN 18   CREATININE 3.6*   CALCIUM 7.8*   ALBUMIN 3.5   PROT 7.8   BILITOT 1.3*   ALKPHOS 102   ALT 20   AST 38   MG 2.0       Lactic Acid:   Recent Labs   Lab 03/28/19 2037   LACTATE 4.8*       Significant Imaging: I have reviewed and interpreted all pertinent imaging results/findings within the past 24 hours.      ABG  No results for input(s): PH, PO2, PCO2, HCO3, BE in the last 168 hours.  Assessment/Plan:     Cardiac/Vascular  Orthostatic hypotension  DDx includes autonomic dysfunction vs under treated adrenal insufficiency vs hypovolemia.  While this is an initial DDx, she really has many reasons why she may have orthostatic hypotension but it has gotten precipitously worse over the last couple of months.  For the moment we will give her a 500 cc LR bolus and recheck her LA.       Critical secondary to Patient has a condition that poses threat to life and bodily function: Hypotension     Critical care was time spent personally by me on the following activities: development of treatment plan with patient or surrogate and bedside caregivers, discussions with consultants, evaluation of patient's response to treatment, examination of patient, ordering and performing treatments and interventions, ordering and review of laboratory studies, ordering and review of  radiographic studies, pulse oximetry, re-evaluation of patient's condition. This critical care time did not overlap with that of any other provider or involve time for any procedures.    Thank you for your consult. I will follow-up with patient. Please contact us if you have any additional questions.     Tanner Robledo MD  Critical Care Medicine  [facility]

## 2019-03-29 NOTE — ED NOTES
"ROUNDING- PT updated on POC. PT in no distress at this time. Call bed within reach. Pending: Bed placement. Team has ordered a midline placement.  Pts BP is 94/58, 71 MAP. Pt is AOx4, stating that she "feels fine." No other needs at this time. Will continue to monitor  "

## 2019-03-29 NOTE — HPI
Ms. Patricio is a pleasant 59 yo lady w/ pulmonary sarcoidosis on 5 L home oxygen, Class 3 HFrEF (3/2019 EF 35%), perm Afib/flutter s/p 6/2017 AVN ablation & PPM, ESRD (TTS), Type 2 DM (3/2019 A1c 6.2%) and recently diagnosed renal insufficiency secondary to chronic steroid therapy (now on pred 20 qd) who presents today c/o orthostatic hypotension.  She was recently admitted from 3/24 - 3/26 where she was having similar complaints despite being on midodrine 15 mg po TID and found to have adrenal insufficiency.  During that admission her prednisone was increased from 10 to 20 mg po qd with improvement in her symptoms.  The day after discharge she reports recurrent orthostatic hypotension and dyspnea on exertion with her activity now limited to less than a half block.  Today her home health nurse and home PT found her SBP to be in the 70s whenever she was standing or active.  On evaluation she reports that her light headedness is not present when she is laying down.  In the ED she was given vanc & zosyn for possible sepsis and ICU consulted for lactic acidosis.  She was given 500 cc LR but her lactic acid still increased to 5.5 from 4.8. She will be admitted to MICU for further monitoring.

## 2019-03-29 NOTE — PROVIDER PROGRESS NOTES - EMERGENCY DEPT.
Encounter Date: 3/28/2019    ED Physician Progress Notes        Physician Note:   Because of continued elevation of lactate, patient is being admitted to the icu for further tx and evaluation.  She has remained hemodynamically stable during her stay in the er

## 2019-03-29 NOTE — ASSESSMENT & PLAN NOTE
DDx includes autonomic dysfunction vs under treated adrenal insufficiency vs hypovolemia.  While this is an initial DDx, she really has many reasons why she may have orthostatic hypotension but it has gotten precipitously worse over the last couple of months.  For the moment we will give her a 500 cc LR bolus and recheck her LA.

## 2019-03-29 NOTE — ASSESSMENT & PLAN NOTE
-Judicious fluid use  -Hold starting her on Toprol or ACE-i as she is currently dealing with hypotension

## 2019-03-29 NOTE — ED NOTES
START OF SHIFT- Report received from Inder Almonte. Pts white board updated. PT updated on POC. Pending: Bed Placement. Pt is also in need of another Iv. Was made aware by night shift that many attempts were made, included US guided, with no success. Will notify CC team about need IV.  Pt in no distress at this time. Will continue to monitor.

## 2019-03-29 NOTE — RESIDENT HANDOFF
Handoff     Primary Team: Networked reference to record PCT  Room Number: ED 15/15     Patient Name: Sisi Medel MRN: [id number]     Date of Birth: [id number] Allergies: Bactrim [sulfamethoxazole-trimethoprim] and Nsaids (non-steroidal anti-inflammatory drug)     Age: 58 y.o. Admit Date: 3/28/2019     Sex: female  BMI: Body mass index is 38.47 kg/m².     Code Status: Full Code        Illness Level (current clinical status): Watcher - No    Reason for Admission: Lactic acidemia    Brief HPI (pertinent PMH and diagnosis or differential diagnosis):   Ms. Patricio is a pleasant 57 yo lady w/ pulmonary sarcoidosis on 5 L home oxygen, Class 3 HFrEF (3/2019 EF 35%), perm Afib/flutter s/p 6/2017 AVN ablation & PPM, ESRD (TTS), Type 2 DM (3/2019 A1c 6.2%) and recently diagnosed renal insufficiency secondary to chronic steroid therapy (now on pred 20 qd) who presents today c/o orthostatic hypotension.  She was recently admitted from 3/24 - 3/26 where she was having similar complaints despite being on midodrine 15 mg po TID and found to have adrenal insufficiency.  During that admission her prednisone was increased from 10 to 20 mg po qd with improvement in her symptoms.  The day after discharge she reports recurrent orthostatic hypotension and dyspnea on exertion with her activity now limited to less than a half block.  Today her home health nurse and home PT found her SBP to be in the 70s whenever she was standing or active.  On evaluation she reports that her light headedness is not present when she is laying down.  In the ED she was given vanc & zosyn for possible sepsis and ICU consulted for lactic acidosis.  She was given 500 cc LR but her lactic acid still increased to 5.5 from 4.8. She will be admitted to MICU for further monitoring.    Hospital Course (updated, brief assessment by system or problem, significant events):   After admission to MICU she was given a 1L bolus of LR and her lactic acidosis  resolved.    Tasks (specific, using if-then statements):   1. Consult pulm on the floor  2. Follow up on endocrinology consult for adrenal insufficiency    Contingency Plan (special circumstances anticipated and plan):   1. If she needs additional oxygen step up her oxygen therapy  2. If she becomes hypotensive, give her just a 1 L bolus and lay her down prior to rechecking her BP     Discharge Disposition: Home-Health Care Svc    Mentored By: Dr. Altamirano

## 2019-03-29 NOTE — CONSULTS
Consult acknowledged and received with full note to follow.    Tanner Robledo MD  PGY - 3  Pager: [phone]

## 2019-03-29 NOTE — ASSESSMENT & PLAN NOTE
ESRD on iHD TTS  [facility]  5 hours  EDW:  115 kg.  RAISSA ANDRADE    Plan/recommendations  -discontinue cinacalcet due to hypocalcemia  -PTH in AM  -repeat RFP   -low threshold to start HD for metabolic clearance due to her acidosis.  Will follow-up.  -continue strict I/O's

## 2019-03-29 NOTE — CLINICAL REVIEW
Patient seen and examined in the ED after being stepped down from MICU.  Patient well known to me from recent admission for severe orthostatic hypotension.      She states that she feels much better and back to baseline.  All notes and recommendations from MICU, Nephrology and Endocrine reviewed.  Planning HD in am.    Will have Cardiology weigh in for options in am given CHF.  Consider GUICHO hose and abdominal binder.  Unfortunately, she is also a terrible iv access patient, so will need to talk to Renal about maybe placing a port.    I have discontinued all antibiotics, as no signs of any infection.  Suspect WBC is from demargination from stress reaction from hypotension.

## 2019-03-29 NOTE — CONSULTS
"[facility]  Nephrology  Consult Note    Patient Name: Sisi Medel  MRN: [id number]  Admission Date: 3/28/2019  Hospital Length of Stay: 0 days  Attending Provider: Jj Altamirano MD   Primary Care Physician: Carlos A Caputo MD  Principal Problem:Lactic acidemia    Inpatient consult to Nephrology  Consult performed by: Toni Fernando NP  Consult ordered by: Tanner Robledo MD  Reason for consult: ESRD        Subjective:     HPI: Ms. Sisi Medel is a 59 y/o  female with PMHx ESRD on HD, autonomic dysfunction, adrenal insufficiency, chronic systolic and diastolic heart failure (EF 35%),  pulmonary HTN, PAF, sarcoidosis, and recent hospitalization for PNA (03/2019) presented with shortness of breath, weakness, dizziness, and lightheadedness. She reports symptoms have been going on for a while now, but for the past week, the symptoms have been worse, including decrease appetite at home.  During her recent admission, she had similar symptoms and was started on midodrine 15 mg TID, which improved her blood pressures and symptoms and she was able to get to her EDW of 115 kg.  Since discharge, she was unsure of how to take this medication, and was only taking it prior to her hemodialysis appointment.  During her treatment on Thursday, she had episodes of symptomatic hypotension during the treatment along with post treatment, and she had to stay in the dialysis unit after because she was feeling so weak, and reports that she left at her EDW of 115 kg.  She typically uses oxygen at home at 5L per NC, and she feels as though her oxygenation at this time is at her baseline.  Upon arrival to the ED, she was adminstered 1.5L of LR with improvement in blood pressure and felt "better" than when she arrived.  Labs pertinent for increased lactate of 5.5, elevated procalcitonin, and leukocytosis.  She denies fever/chills and endorses new onset dry cough that started at " dialysis on the 28 th.  Chemistries revealing a HAGMA (bicarb of 15) and hypocalcemia, noted to be taking cinacalcet.  Nephrology has been consulted for ESRD management while in-patient.    She dialyzes on a MWF TTS schedule at [facility] under the care of Dr. Wilson.  She reports an EDW of 115 kg and a treatment time of 5 hours.   She has an AVG to her RAISSA.              Past Medical History:   Diagnosis Date    Anemia in ESRD (end-stage renal disease) 3/7/2016    Anticoagulant long-term use     Cervical radiculopathy 7/20/2015    CHF (congestive heart failure)     Chronic combined systolic and diastolic heart failure 6/14/2013    EF 20% 2013, improved with Medical therapy, negative PET 2013    Chronic respiratory failure with hypoxia 04/22/2013    On home oxygen at 2-5 liters    Closed fracture of proximal end of right fibula 6/27/2016    Complications due to renal dialysis device, implant, and graft     DDD (degenerative disc disease), lumbar 6/17/2015    Dependence on renal dialysis     Diabetes mellitus type II, controlled 12/8/2017    ESRD on hemodialysis 2/23/2016    Essential hypertension     Gout     Lateral meniscal tear 5/31/2016    Lumbar stenosis 6/17/2015    Obesity, Class III, BMI 40-49.9 (morbid obesity)     Pacemaker     Paroxysmal atrial fibrillation 5/16/2014    Not on anticoagulation    Peripheral neuropathy 11/13/2013    Personal history of gastric ulcer 3/19/2013    Pneumonia of left lower lobe due to infectious organism 3/10/2019    Sarcoidosis, lung 2009    Diagnosed in 2009 with ocular manifestation, on 4L home O2 and PO steroids    Secondary pulmonary hypertension 4/7/2015    Seizure disorder 3/19/2013    Shingles 11/13/2013    Thyroid disease        Past Surgical History:   Procedure Laterality Date    ABDOMINAL SURGERY      ABLATION N/A 6/12/2017    Performed by Bladimir Adorno MD at [facility] CATH LAB    ANGIOPLASTY Left 1/10/2018    Performed by Torrey  CATHRYN Villafana MD at [facility] OR 2ND FLR    ANGIOPLASTY-PERCUTANEOUS TRANSLUMINAL (PTA) Left 2017    Performed by Torrey [facility]afana MD at [facility] OR 2ND FLR    ANGIOPLASTY-PERCUTANEOUS TRANSLUMINAL (PTA) Left 10/12/2016    Performed by Torrey Villafana MD at [facility] OR 2ND FLR    CARDIAC PACEMAKER PLACEMENT       SECTION      x2    DECLOT GRAFT PERCUTANEOUS Left 2017    Performed by Torrey [facility]ana MD at [facility] OR 2ND FLR    DECLOT-GRAFT Left 2016    Performed by Harjit Starr MD at [facility] CATH LAB    DECLOT-GRAFT Left 2016    Performed by Harjit Starr MD at [facility] CATH LAB    ECHOCARDIOGRAM-TRANSESOPHAGEAL N/A 2013    Performed by Delmy Surgeon at [facility] DELMY    fistulogram Left 1/10/2018    Performed by Torrey Villafana MD at [facility] OR 2ND FLR    FISTULOGRAM Left 2017    Performed by Torrey Villafana MD at [facility] CATH LAB    FISTULOGRAM Left 10/12/2016    Performed by Torrey Villafana MD at [facility] OR 2ND FLR    FISTULOGRAM Left 2016    Performed by Harjit Starr MD at [facility] CATH LAB    Fistulogram, LUE AVG, possible intervention Left 2019    Performed by Torrey Villafana MD at [facility] CATH LAB    INJECTION-STEROID-EPIDURAL-TRANSFORAMINAL Right 2015    Performed by Patria [facility]rez MD at [facility] PAIN MGT    INJECTION-STEROID-EPIDURAL-TRANSFORAMINAL Right 2015    Performed by Pat[facility]rez MD at [facility] PAIN MGT    SEKHCWTME-BXWML-[id number] / Left upper AV graft. Left 2/3/2016    Performed by Torrey Villafana MD at [facility] OR 2ND FLR    PCPIBUZGL-CZCCAKLGI-[id number] N/A 2017    Performed by Bladimir Adorno MD at [facility] CATH LAB    OTHER SURGICAL HISTORY      loop recorder implant    PLACEMENT-STENT Left 2017    Performed by Torrey Villafana MD at [facility] OR 2ND FLR    PTA, AV FISTULA N/A 2019    Performed by Torrey Villafana MD at [facility] CATH LAB    stent to fistula      TRANSESOPHAGEAL ECHOCARDIOGRAM (JARAD) N/A  6/27/2016    Performed by [facility]uca MD at [facility] CATH LAB    ULTRASOUND, UPPER GI TRACT, ENDOSCOPIC N/A 1/9/2014    Performed by Stewart Burgess MD at [facility] ENDO (2ND FLR)    VASCULAR SURGERY      fistula to L upper arm        Review of patient's allergies indicates:   Allergen Reactions    Bactrim [sulfamethoxazole-trimethoprim] Other (See Comments)     Causes renal failure    Nsaids (non-steroidal anti-inflammatory drug) Other (See Comments)     Renal failure     Current Facility-Administered Medications   Medication Frequency    atorvastatin tablet 80 mg QHS    clopidogrel tablet 75 mg Daily    fludrocortisone tablet 100 mcg BID    lactated ringers bolus 1,000 mL Once    levETIRAcetam tablet 500 mg BID    lorazepam injection 2 mg Q10 Min PRN    midodrine tablet 15 mg TID    nortriptyline capsule 25 mg QHS    piperacillin-tazobactam 4.5 g in sodium chloride 0.9% 100 mL IVPB (ready to mix system) Q12H    prednisoLONE acetate 1 % ophthalmic suspension 1 drop TID    predniSONE tablet 20 mg Daily    pregabalin capsule 100 mg TID    sevelamer carbonate tablet 800 mg TID WM    sodium chloride 0.9% flush 10 mL PRN    warfarin (COUMADIN) tablet 5 mg Daily     Current Outpatient Medications   Medication    aspirin (ECOTRIN) 81 MG EC tablet    atorvastatin (LIPITOR) 80 MG tablet    cinacalcet (SENSIPAR) 30 MG Tab    clopidogrel (PLAVIX) 75 mg tablet    fludrocortisone (FLORINEF) 0.1 mg Tab    gabapentin (NEURONTIN) 600 MG tablet    levETIRAcetam (KEPPRA) 500 MG Tab    midodrine (PROAMATINE) 5 MG Tab    nortriptyline (PAMELOR) 25 MG capsule    omeprazole (PRILOSEC) 20 MG capsule    patiromer calcium sorbitex (VELTASSA) 16.8 gram PwPk    prednisoLONE acetate (PRED FORTE) 1 % DrpS    predniSONE (DELTASONE) 20 MG tablet    pregabalin (LYRICA) 100 MG capsule    DENISE-LINDA 0.8 mg Tab    RENVELA 800 mg Tab    tiZANidine (ZANAFLEX) 4 MG tablet    TRADJENTA 5 mg Tab tablet    warfarin  (COUMADIN) 5 MG tablet    blood sugar diagnostic Strp    clopidogrel (PLAVIX) 75 mg tablet    lancets Misc    nortriptyline (PAMELOR) 25 MG capsule     Family History     Problem Relation (Age of Onset)    Coronary artery disease Father    Diabetes Mother, Father, Maternal Grandmother    Hypertension Mother, Father    Kidney failure Mother    Lupus Sister        Tobacco Use    Smoking status: Former Smoker     Packs/day: 0.50     Years: 10.00     Pack years: 5.00     Types: Cigarettes     Last attempt to quit: 1994     Years since quittin.9    Smokeless tobacco: Never Used   Substance and Sexual Activity    Alcohol use: No     Alcohol/week: 0.0 oz     Comment: rarely    Drug use: No    Sexual activity: Not Currently     Review of Systems   Constitutional: Positive for fatigue. Negative for chills and fever.   HENT: Negative for ear pain, hearing loss, mouth sores and sore throat.    Eyes: Negative for photophobia, pain and visual disturbance.   Respiratory: Positive for shortness of breath. Negative for apnea, cough and chest tightness.    Cardiovascular: Negative for chest pain, palpitations and leg swelling.   Gastrointestinal: Negative for abdominal distention, abdominal pain, anal bleeding, blood in stool, constipation, diarrhea, nausea, rectal pain and vomiting.   Musculoskeletal: Negative for arthralgias and myalgias.   Skin: Negative for rash and wound.   Neurological: Positive for light-headedness. Negative for dizziness, syncope and numbness.     Objective:     Vital Signs (Most Recent):  Temp: 97.5 °F (36.4 °C) (19 1000)  Pulse: 69 (19 1015)  Resp: 16 (19 1015)  BP: 103/63 (19 1015)  SpO2: 100 % (19 1015) Vital Signs (24h Range):  Temp:  [96.7 °F (35.9 °C)-98.4 °F (36.9 °C)] 97.5 °F (36.4 °C)  Pulse:  [68-72] 69  Resp:  [13-30] 16  SpO2:  [97 %-100 %] 100 %  BP: ()/(52-80) 103/63     Weight: 114.8 kg (253 lb) (19 1504)  Body mass index is 38.47  kg/m².  Body surface area is 2.35 meters squared.    I/O last 3 completed shifts:  In: 1200 [IV Piggyback:1200]  Out: -     Physical Exam   Constitutional: She is oriented to person, place, and time. She appears well-developed. No distress. Nasal cannula in place.   HENT:   Head: Normocephalic and atraumatic.   Right Ear: External ear normal.   Left Ear: External ear normal.   Eyes: Conjunctivae and EOM are normal. Right eye exhibits no discharge. Left eye exhibits no discharge.   Cardiovascular: An irregularly irregular rhythm present. Exam reveals no gallop and no friction rub.   Murmur heard.  Pulmonary/Chest: Effort normal. No respiratory distress. She has no wheezes. She has no rales.   Abdominal: Soft. Bowel sounds are normal. She exhibits no distension. There is no tenderness.   Musculoskeletal: Normal range of motion. She exhibits edema. She exhibits no deformity.   Neurological: She is alert and oriented to person, place, and time.   Skin: Skin is warm and dry. She is not diaphoretic.   Psychiatric: She has a normal mood and affect. Her behavior is normal.       Significant Labs:  CBC:   Recent Labs   Lab 03/29/19  0552   WBC 24.79*   RBC 4.79   HGB 13.2   HCT 44.4      MCV 93   MCH 27.6   MCHC 29.7*     CMP:   Recent Labs   Lab 03/29/19  0552   GLU 61*   CALCIUM 6.5*   ALBUMIN 3.0*   PROT 7.2      K 3.0*   CO2 15*      BUN 16   CREATININE 3.0*   ALKPHOS 92   ALT 15   AST 32   BILITOT 1.2*           Assessment/Plan:     End stage renal failure on dialysis  ESRD on iHD TTS  [facility]  5 hours  EDW:  115 kg.  RAISSA AVG    Plan/recommendations  -discontinue cinacalcet due to hypocalcemia  -PTH in AM  -repeat RFP   -low threshold to start HD for metabolic clearance due to her acidosis.  Will follow-up.  -continue strict I/O's      Toni Gallegso NP  Nephrology  [facility]

## 2019-03-29 NOTE — HPI
"Ms. Sisi Medel is a 57 y/o  female with PMHx ESRD on HD, autonomic dysfunction, adrenal insufficiency, chronic systolic and diastolic heart failure (EF 35%),  pulmonary HTN, PAF, sarcoidosis, and recent hospitalization for PNA (03/2019) presented with shortness of breath, weakness, dizziness, and lightheadedness. She reports symptoms have been going on for a while now, but for the past week, the symptoms have been worse, including decrease appetite at home.  During her recent admission, she had similar symptoms and was started on midodrine 15 mg TID, which improved her blood pressures and symptoms and she was able to get to her EDW of 115 kg.  Since discharge, she was unsure of how to take this medication, and was only taking it prior to her hemodialysis appointment.  During her treatment on Thursday, she had episodes of symptomatic hypotension during the treatment along with post treatment, and she had to stay in the dialysis unit after because she was feeling so weak, and reports that she left at her EDW of 115 kg.  She typically uses oxygen at home at 5L per NC, and she feels as though her oxygenation at this time is at her baseline.  Upon arrival to the ED, she was adminstered 1.5L of LR with improvement in blood pressure and felt "better" than when she arrived.  Labs pertinent for increased lactate of 5.5, elevated procalcitonin, and leukocytosis.  She denies fever/chills and endorses new onset dry cough that started at dialysis on the 28 th.  Chemistries revealing a HAGMA (bicarb of 15) and hypocalcemia, noted to be taking cinacalcet.  Nephrology has been consulted for ESRD management while in-patient.    She dialyzes on a MWF TTS schedule at [facility] under the care of Dr. Wilson.  She reports an EDW of 115 kg and a treatment time of 5 hours.  She has an AVG to her RAISSA.            "

## 2019-03-29 NOTE — SUBJECTIVE & OBJECTIVE
Past Medical History:   Diagnosis Date    Anemia in ESRD (end-stage renal disease) 3/7/2016    Anticoagulant long-term use     Cervical radiculopathy 2015    CHF (congestive heart failure)     Chronic combined systolic and diastolic heart failure 2013    EF 20% , improved with Medical therapy, negative PET     Chronic respiratory failure with hypoxia 2013    On home oxygen at 2-5 liters    Closed fracture of proximal end of right fibula 2016    Complications due to renal dialysis device, implant, and graft     DDD (degenerative disc disease), lumbar 2015    Dependence on renal dialysis     Diabetes mellitus type II, controlled 2017    ESRD on hemodialysis 2016    Essential hypertension     Gout     Lateral meniscal tear 2016    Lumbar stenosis 2015    Obesity, Class III, BMI 40-49.9 (morbid obesity)     Pacemaker     Paroxysmal atrial fibrillation 2014    Not on anticoagulation    Peripheral neuropathy 2013    Personal history of gastric ulcer 3/19/2013    Pneumonia of left lower lobe due to infectious organism 3/10/2019    Sarcoidosis, lung 2009    Diagnosed in  with ocular manifestation, on 4L home O2 and PO steroids    Secondary pulmonary hypertension 2015    Seizure disorder 3/19/2013    Shingles 2013    Thyroid disease        Past Surgical History:   Procedure Laterality Date    ABDOMINAL SURGERY      ABLATION N/A 2017    Performed by Bl[facility]imir [facility]orno MD at [facility] CATH LAB    ANGIOPLASTY Left 1/10/2018    Performed by Torrey Villafana MD at [facility] OR 2ND FLR    ANGIOPLASTY-PERCUTANEOUS TRANSLUMINAL (PTA) Left 2017    Performed by Torrey [facility]afana MD at [facility] OR 2ND FLR    ANGIOPLASTY-PERCUTANEOUS TRANSLUMINAL (PTA) Left 10/12/2016    Performed by Torrey Villafana MD at [facility] OR 2ND FLR    CARDIAC PACEMAKER PLACEMENT       SECTION      x2    DECLOT GRAFT PERCUTANEOUS Left  2/7/2017    Performed by [facility] at [facility] OR 2ND FLR    DECLOT-GRAFT Left 6/21/2016    Performed by Harjit Starr MD at [facility] CATH LAB    DECLOT-GRAFT Left 6/20/2016    Performed by [facility]jit Starr MD at [facility] CATH LAB    ECHOCARDIOGRAM-TRANSESOPHAGEAL N/A 6/27/2013    Performed by Delmy Surgeon at [facility] DELMY    fistulogram Left 1/10/2018    Performed by Torrey [facility]afana MD at [facility] OR 2ND FLR    FISTULOGRAM Left 9/13/2017    Performed by Torrey [facility]afana MD at [facility] CATH LAB    FISTULOGRAM Left 10/12/2016    Performed by Torrey Villafana MD at [facility] OR 2ND FLR    FISTULOGRAM Left 6/21/2016    Performed by Harjit Starr MD at [facility] CATH LAB    Fistulogram, LUE AVG, possible intervention Left 1/30/2019    Performed by Torrey [facility]afana MD at [facility] CATH LAB    INJECTION-STEROID-EPIDURAL-TRANSFORAMINAL Right 7/20/2015    Performed by [facility] at [facility] PAIN MGT    INJECTION-STEROID-EPIDURAL-TRANSFORAMINAL Right 5/21/2015    [facility]    [id number]-[id number]-[id number] / Left upper AV graft. Left 2/3/2016    Performed by Torrey Villafana MD at [facility] OR 2ND FLR    OJVIHWRVI-[id number]-EJGRLQCHPCKDZ N/A 1/11/2017    Performed by Bladimir Adorno MD at [facility] CATH LAB    OTHER SURGICAL HISTORY      loop recorder implant    PLACEMENT-STENT Left 2/7/2017    Performed by Torrey [facility]afana MD at [facility] OR 2ND FLR    PTA, AV FISTULA N/A 1/30/2019    Performed by Torrey [facility]ana MD at [facility] CATH LAB    stent to fistula      TRANSESOPHAGEAL ECHOCARDIOGRAM (JARAD) N/A 6/27/2016    Performed by Sourav Machuca MD at [facility] CATH LAB    ULTRASOUND, UPPER GI TRACT, ENDOSCOPIC N/A 1/9/2014    Performed by Stewart Burgess MD at [facility] ENDO (2ND FLR)    VASCULAR SURGERY      fistula to L upper arm        Review of patient's allergies indicates:   Allergen Reactions    Bactrim [sulfamethoxazole-trimethoprim] Other (See Comments)     Causes renal  failure    Nsaids (non-steroidal anti-inflammatory drug) Other (See Comments)     Renal failure       Family History     Problem Relation (Age of Onset)    Coronary artery disease Father    Diabetes Mother, Father, Maternal Grandmother    Hypertension Mother, Father    Kidney failure Mother    Lupus Sister        Tobacco Use    Smoking status: Former Smoker     Packs/day: 0.50     Years: 10.00     Pack years: 5.00     Types: Cigarettes     Last attempt to quit: 1994     Years since quittin.9    Smokeless tobacco: Never Used   Substance and Sexual Activity    Alcohol use: No     Alcohol/week: 0.0 oz     Comment: rarely    Drug use: No    Sexual activity: Not Currently      Review of Systems   Constitutional: Positive for fatigue. Negative for chills and fever.   HENT: Negative for ear pain, hearing loss, mouth sores and sore throat.    Eyes: Negative for photophobia, pain and visual disturbance.   Respiratory: Positive for shortness of breath. Negative for apnea, cough and chest tightness.    Cardiovascular: Negative for chest pain, palpitations and leg swelling.   Gastrointestinal: Negative for abdominal distention, abdominal pain, anal bleeding, blood in stool, constipation, diarrhea, nausea, rectal pain and vomiting.   Musculoskeletal: Negative for arthralgias and myalgias.   Skin: Negative for rash and wound.   Neurological: Positive for light-headedness. Negative for dizziness, syncope and numbness.     Objective:     Vital Signs (Most Recent):  Temp: 96.7 °F (35.9 °C) (19 1504)  Pulse: 69 (19)  Resp: 20 (19)  BP: 131/66 (19 1504)  SpO2: 100 % (19) Vital Signs (24h Range):  Temp:  [96.7 °F (35.9 °C)] 96.7 °F (35.9 °C)  Pulse:  [69-72] 69  Resp:  [18-20] 20  SpO2:  [100 %] 100 %  BP: (131)/(66) 131/66   Weight: 114.8 kg (253 lb)  Body mass index is 38.47 kg/m².      Intake/Output Summary (Last 24 hours) at 3/28/2019 4897  Last data filed at 3/28/2019  2244  Gross per 24 hour   Intake 100 ml   Output --   Net 100 ml       Physical Exam   Constitutional: She is oriented to person, place, and time. No distress.   Eyes: Scleral icterus (mild) is present.   Cardiovascular: Regular rhythm and intact distal pulses. Tachycardia present. Exam reveals no gallop and no friction rub.   No murmur heard.  Pulmonary/Chest: No respiratory distress. She has no wheezes. She has rales (Mild in LLL).   Abdominal: Soft. Bowel sounds are normal. She exhibits no distension. There is no tenderness.   Musculoskeletal:   LUE AVF w/ palpable thrill   Neurological: She is alert and oriented to person, place, and time.       Vents:     Lines/Drains/Airways     Drain                 Hemodialysis AV Graft Left upper arm -- days          Peripheral Intravenous Line                 Peripheral IV - Single Lumen 03/28/19 1825 Right Antecubital less than 1 day              Significant Labs:    CBC/Anemia Profile:  Recent Labs   Lab 03/28/19  1840   WBC 12.17   HGB 14.2   HCT 48.3      MCV 95   RDW 18.9*        Chemistries:  Recent Labs   Lab 03/28/19  1840   *   K 4.1   CL 91*   CO2 17*   BUN 18   CREATININE 3.6*   CALCIUM 7.8*   ALBUMIN 3.5   PROT 7.8   BILITOT 1.3*   ALKPHOS 102   ALT 20   AST 38   MG 2.0       Lactic Acid:   Recent Labs   Lab 03/28/19  2037   LACTATE 4.8*       Significant Imaging: I have reviewed and interpreted all pertinent imaging results/findings within the past 24 hours.

## 2019-03-29 NOTE — ASSESSMENT & PLAN NOTE
Suspect multifactorial: volume depletion after dialysis, autonomic dysfunction, pulmonary hypertension, chronic systolic heart failure, and adrenal insufficiency are all possible contributors. Her hypotension should have resolved with an increased dose of prednisone if it was due to adrenal insufficiency, so this doesn't appear to be the case.    Recommend:  Scheduled midodrine 15 mg in the AM, 15 mg at lunch (or just after dialysis on HD days), and 15 mg at dinner.   Elevate head of bed at night to prevent supine hypertension. If supine hypotension becomes at issue at night, can decrease PM dose to 10 mg.  Consider graded compression stockings  Okay to continue fludrocortisone, but have to monitor for hypokalemia

## 2019-03-29 NOTE — ED NOTES
Spoke with Dr. Thompson about pt peripheral IV unstable and poor vein, unsuccessful attempts to collect blood per phlebotomy, Stephanie. Dr. Thompson states will consult anesthesia.

## 2019-03-29 NOTE — ASSESSMENT & PLAN NOTE
DDx includes autonomic dysfunction vs under treated adrenal insufficiency vs hypovolemia.  Will consult endocrinology for possible under dosing of her fludrocortisone.  If she does not have under treated adrenal insufficiency then she most likely has autonomic dysfunction.  -Endocrinology consult pending

## 2019-03-29 NOTE — CONSULTS
[facility]  Endocrinology  Consult Note    Consult Requested by: BOB Thompson MD   Reason for admit: Lactic acidemia    HISTORY OF PRESENT ILLNESS:  Reason for Consult: Orthostatic hypotension; concern for adrenal insufficiency    HPI:   Patient is a 58 y.o. female with a diagnosis of anuric ESRD on three times weekly HD, central adrenal insufficiency secondary to chronic corticosteroid use for treatment of pulmonary sarcoidosis, chronic combined heart failure, obesity, pulmonary hypertension, and chronic intermittent hypotension worse during dialysis sessions. She presented for worsening weakness and orthostatic presyncope, which occurred after dialysis. We saw her for the same issue a few days ago, and she is now readmitted with recurrence of hypotension. She was discharged on a doubled dose of prednisone for the next few days. She additionally takes fludrocortisone 100 mcg daily and midodrine 15 mg daily prior to dialysis sessions. We are consulted to evaluate for adrenal insufficiency as the cause for intermittent hypotension.        Medications and/or Treatments Impacting Glycemic Control:  Immunotherapy:  Immunosuppressants     None        Steroids:   Hormones (From admission, onward)    Start     Stop Route Frequency Ordered    03/29/19 2100  fludrocortisone tablet 100 mcg      -- Oral 2 times daily 03/29/19 0908    03/29/19 0900  predniSONE tablet 20 mg      -- Oral Daily 03/29/19 0338        Pressors:    Autonomic Drugs (From admission, onward)    Start     Stop Route Frequency Ordered    03/29/19 0900  midodrine tablet 15 mg      -- Oral 3 times daily 03/29/19 0314            (Not in a hospital admission)    Current Facility-Administered Medications   Medication Dose Route Frequency Provider Last Rate Last Dose    atorvastatin tablet 80 mg  80 mg Oral QHS Tanner Robledo MD   80 mg at 03/29/19 0500    clopidogrel tablet 75 mg  75 mg Oral Daily Tanner Robledo MD   75  mg at 03/29/19 0818    fludrocortisone tablet 100 mcg  100 mcg Oral BID Tanner Robledo MD        lactated ringers bolus 1,000 mL  1,000 mL Intravenous Once Tanner Robledo MD        levETIRAcetam tablet 500 mg  500 mg Oral BID Tanner Robledo MD   500 mg at 03/29/19 0817    lorazepam injection 2 mg  2 mg Intravenous Q10 Min PRN Tanner Robledo MD        midodrine tablet 15 mg  15 mg Oral TID Tanner Robledo MD   15 mg at 03/29/19 0852    nortriptyline capsule 25 mg  25 mg Oral QHS Tanner Robledo MD   25 mg at 03/29/19 0459    piperacillin-tazobactam 4.5 g in sodium chloride 0.9% 100 mL IVPB (ready to mix system)  4.5 g Intravenous Q12H Tanner Robledo MD   Stopped at 03/29/19 0659    prednisoLONE acetate 1 % ophthalmic suspension 1 drop  1 drop Left Eye TID Tanner Robledo MD   1 drop at 03/29/19 0817    predniSONE tablet 20 mg  20 mg Oral Daily Tanner Robledo MD   20 mg at 03/29/19 0818    pregabalin capsule 100 mg  100 mg Oral TID Tanner Robledo MD   100 mg at 03/29/19 0818    sevelamer carbonate tablet 800 mg  800 mg Oral TID WM Tanner Robledo MD   800 mg at 03/29/19 1202    sodium chloride 0.9% flush 10 mL  10 mL Intravenous PRN Tanner Robledo MD        warfarin (COUMADIN) tablet 5 mg  5 mg Oral Daily Tanner Robledo MD         Current Outpatient Medications   Medication Sig Dispense Refill    aspirin (ECOTRIN) 81 MG EC tablet Take 81 mg by mouth once daily.      atorvastatin (LIPITOR) 80 MG tablet TAKE 1 TABLET BY MOUTH EVERY EVENING 90 tablet 1    cinacalcet (SENSIPAR) 30 MG Tab Take 30 mg by mouth daily with breakfast.      clopidogrel (PLAVIX) 75 mg tablet TAKE 1 TABLET(75 MG) BY MOUTH EVERY DAY 90 tablet 0    fludrocortisone (FLORINEF) 0.1 mg Tab Take 100 mcg by mouth 2 (two) times daily.      gabapentin (NEURONTIN) 600 MG tablet Take 600 mg by mouth 2 (two) times daily.       levETIRAcetam (KEPPRA) 500  MG Tab TAKE 1 TABLET BY MOUTH TWICE DAILY 180 tablet 3    midodrine (PROAMATINE) 5 MG Tab Take 5 mg by mouth 3 (three) times daily with meals.       nortriptyline (PAMELOR) 25 MG capsule TAKE 1 CAPSULE BY MOUTH EVERY EVENING 90 capsule 1    omeprazole (PRILOSEC) 20 MG capsule TAKE 1 CAPSULE BY MOUTH EVERY DAY 90 capsule 4    patiromer calcium sorbitex (VELTASSA) 16.8 gram PwPk Take 33.6 g by mouth once daily.       prednisoLONE acetate (PRED FORTE) 1 % DrpS INSTILL ONE DROP INTO  LEFT EYE THREE TIMES A DAY  3    predniSONE (DELTASONE) 20 MG tablet Take 1 tablet (20 mg total) by mouth once daily. 30 tablet 2    pregabalin (LYRICA) 100 MG capsule Take 1 capsule (100 mg total) by mouth 3 (three) times daily. 270 capsule 1    DENISE-LINDA 0.8 mg Tab TAKE 1 TABLET BY MOUTH ONCE DAILY  0    RENVELA 800 mg Tab TAKE 1 TABLET BY MOUTH THREE TIMES DAILY WITH MEALS 90 tablet 0    tiZANidine (ZANAFLEX) 4 MG tablet TAKE 1 TABLET BY MOUTH EVERY DAY AS NEEDED FOR MUSCLE SPASMS (Patient taking differently: TAKE 1 TABLET BY MOUTH EVERY NIGHT  AS NEEDED FOR MUSCLE SPASMS) 90 tablet 0    TRADJENTA 5 mg Tab tablet TAKE 1 TABLET(5 MG) BY MOUTH EVERY DAY 90 tablet 0    warfarin (COUMADIN) 5 MG tablet TAKE 1 TABLET BY MOUTH EVERY DAY EXCEPT TH 1AND 1/2 TABLETS ON MONDAY AND FRIDAY OR AS DIRECTED (Patient taking differently: TAKE 1 TABLET BY MOUTH EVERY DAY OR AS DIRECTED) 120 tablet 0    blood sugar diagnostic Strp 1 each by Misc.(Non-Drug; Combo Route) route once daily. (Patient taking differently: 1 each by Misc.(Non-Drug; Combo Route) route 2 (two) times daily. ) 100 each 3    clopidogrel (PLAVIX) 75 mg tablet TAKE 1 TABLET(75 MG) BY MOUTH EVERY DAY 30 tablet 0    lancets Misc 1 each by Misc.(Non-Drug; Combo Route) route once daily. (Patient taking differently: 1 each by Misc.(Non-Drug; Combo Route) route 2 (two) times daily. ) 100 each 3    nortriptyline (PAMELOR) 25 MG capsule Take 1 capsule by mouth nightly  1          PMH, PSH, FH, SH updated and reviewed     Review of Systems   Constitutional: Positive for chills. Negative for unexpected weight change.   Eyes: Negative for visual disturbance.   Respiratory: Positive for shortness of breath.    Cardiovascular: Negative for chest pain.   Gastrointestinal: Negative for abdominal pain.   Genitourinary: Negative for urgency.   Musculoskeletal: Negative for arthralgias.   Skin: Negative for wound.   Neurological: Positive for weakness. Negative for headaches.   Hematological: Does not bruise/bleed easily.   Psychiatric/Behavioral: Negative for sleep disturbance.     PHYSICAL EXAMINATION:  Vitals:    03/29/19 1245   BP: 113/72   Pulse: 69   Resp: 16   Temp:      Body mass index is 38.47 kg/m².    Physical Exam   Constitutional: She is oriented to person, place, and time. She appears well-developed. No distress.   Obese with cushingoid body habitus   HENT:   Right Ear: External ear normal.   Left Ear: External ear normal.   Nose: Nose normal.   Hearing grossly normal  Dentition grossly normal   Eyes: Conjunctivae are normal. Right eye exhibits no discharge. Left eye exhibits no discharge.   Cardiovascular: Normal rate and regular rhythm.   Pulmonary/Chest: Effort normal. No respiratory distress.   Musculoskeletal: She exhibits no edema.   No digital clubbing or extremity cyanosis   Neurological: She is alert and oriented to person, place, and time. Coordination normal.   Skin: No rash noted.   R anterior shin ulcer   Psychiatric: She has a normal mood and affect. Her behavior is normal.   Alert and oriented to person, place, and situation.   Nursing note and vitals reviewed.    .     ASSESSMENT and PLAN:    Idiopathic hypotension  Suspect multifactorial: volume depletion after dialysis, autonomic dysfunction, pulmonary hypertension, chronic systolic heart failure, and adrenal insufficiency are all possible contributors. Her hypotension should have resolved with an increased dose  of prednisone if it was due to adrenal insufficiency, so this doesn't appear to be the case.    Recommend:  Scheduled midodrine 15 mg in the AM, 15 mg at lunch (or just after dialysis on HD days), and 15 mg at dinner.   Elevate head of bed at night to prevent supine hypertension. If supine hypotension becomes at issue at night, can decrease PM dose to 10 mg.  Consider graded compression stockings  Okay to continue fludrocortisone, but have to monitor for hypokalemia    Diabetes mellitus type II, controlled  Not on diabetes medications at home.    POC AC/HS with sliding scale while inpatient.    Secondary adrenal insufficiency  I do not think the hypotension is related to adrenal insufficiency. She is on a much higher dose of steroids than what she requires, so any residual hypotension must be related to another etiology. See above.    Recommend reducing PDN down to 10 mg once daily at discharge, which is her maintenance dose.  Okay to continue fludrocortisone 100 mcg daily, but this isn't necessary from an adrenal standpoint.      Pulmonary sarcoidosis  Chronic corticosteroid use has resulted in adrenal insufficiency.        DISCHARGE NEEDS: will assess daily    Bladimir Arriola MD  Endocrinology  [facility]

## 2019-03-29 NOTE — H&P
[facility]  Critical Care Medicine  History & Physical    Patient Name: Sisi Medel  MRN: [id number]  Admission Date: 3/28/2019  Hospital Length of Stay: 0 days  Code Status: Full Code  Attending Physician: Jj Altamirano MD   Primary Care Provider: Carlos A Caputo MD   Principal Problem: Lactic acidemia    Subjective:     HPI:  Ms. Patricio is a pleasant 59 yo lady w/ pulmonary sarcoidosis on 5 L home oxygen, Class 3 HFrEF (3/2019 EF 35%), perm Afib/flutter s/p 6/2017 AVN ablation & PPM, ESRD (TTS), Type 2 DM (3/2019 A1c 6.2%) and recently diagnosed renal insufficiency secondary to chronic steroid therapy (now on pred 20 qd) who presents today c/o orthostatic hypotension.  She was recently admitted from 3/24 - 3/26 where she was having similar complaints despite being on midodrine 15 mg po TID and found to have adrenal insufficiency.  During that admission her prednisone was increased from 10 to 20 mg po qd with improvement in her symptoms.  The day after discharge she reports recurrent orthostatic hypotension and dyspnea on exertion with her activity now limited to less than a half block.  Today her home health nurse and home PT found her SBP to be in the 70s whenever she was standing or active.  On evaluation she reports that her light headedness is not present when she is laying down.  In the ED she was given vanc & zosyn for possible sepsis and ICU consulted for lactic acidosis.  She was given 500 cc LR but her lactic acid still increased to 5.5 from 4.8. She will be admitted to MICU for further monitoring.    Hospital/ICU Course:  No notes on file     Past Medical History:   Diagnosis Date    Anemia in ESRD (end-stage renal disease) 3/7/2016    Anticoagulant long-term use     Cervical radiculopathy 7/20/2015    CHF (congestive heart failure)     Chronic combined systolic and diastolic heart failure 6/14/2013    EF 20% 2013, improved with Medical therapy, negative PET      Chronic respiratory failure with hypoxia 2013    On home oxygen at 2-5 liters    Closed fracture of proximal end of right fibula 2016    Complications due to renal dialysis device, implant, and graft     DDD (degenerative disc disease), lumbar 2015    Dependence on renal dialysis     Diabetes mellitus type II, controlled 2017    ESRD on hemodialysis 2016    Essential hypertension     Gout     Lateral meniscal tear 2016    Lumbar stenosis 2015    Obesity, Class III, BMI 40-49.9 (morbid obesity)     Pacemaker     Paroxysmal atrial fibrillation 2014    Not on anticoagulation    Peripheral neuropathy 2013    Personal history of gastric ulcer 3/19/2013    Pneumonia of left lower lobe due to infectious organism 3/10/2019    Sarcoidosis, lung 2009    Diagnosed in  with ocular manifestation, on 4L home O2 and PO steroids    Secondary pulmonary hypertension 2015    Seizure disorder 3/19/2013    Shingles 2013    Thyroid disease        Past Surgical History:   Procedure Laterality Date    ABDOMINAL SURGERY      ABLATION N/A 2017    Performed by [facility]orno [facility] at [facility] CATH LAB    ANGIOPLASTY Left 1/10/2018    Performed by Torrey Villafana MD at [facility] OR 2ND FLR    ANGIOPLASTY-PERCUTANEOUS TRANSLUMINAL (PTA) Left 2017    Performed by [facility]ana MD at [facility] OR 2ND FLR    ANGIOPLASTY-PERCUTANEOUS TRANSLUMINAL (PTA) Left 10/12/2016    Performed by Torrey [facility]afana MD at [facility] OR 2ND FLR    CARDIAC PACEMAKER PLACEMENT       SECTION      x2    DECLOT GRAFT PERCUTANEOUS Left 2017    Performed by Torrey Villafana MD at [facility] OR 2ND FLR    DECLOT-GRAFT Left 2016    Performed by Harjit Starr MD at [facility] CATH LAB    DECLOT-GRAFT Left 2016    Performed by Harjit Starr MD at [facility] CATH LAB    ECHOCARDIOGRAM-TRANSESOPHAGEAL N/A 2013    Performed by Delmy Surgeon at [facility] DELMY     fistulogram Left 1/10/2018    [facility]formed by [facility] at [facility] OR 2ND FLR    FISTULOGRAM Left 9/13/2017    Performed by [facility] MD at [facility] CATH LAB    FISTULOGRAM Left 10/12/2016    Performed by Torrey Villafana MD at [facility] OR 2ND FLR    FISTULOGRAM Left 6/21/2016    Performed by Harjit Starr MD at [facility] CATH LAB    Fistulogram, LUE AVG, possible intervention Left 1/30/2019    Performed by Torrey [facility]afana MD at [facility] CATH LAB    INJECTION-STEROID-EPIDURAL-TRANSFORAMINAL Right 7/20/2015    Performed by [facility]rez [facility] at [facility] MGT    INJECTION-STEROID-EPIDURAL-TRANSFORAMINAL Right 5/21/2015    [facility]    [id number] / Left upper AV graft. Left 2/3/2016    Performed by [facility]ana MD at [facility] OR 2ND FLR    [id number] N/A 1/11/2017    Performed by Bladimir Adorno MD at [facility] CATH LAB    OTHER SURGICAL HISTORY      loop recorder implant    PLACEMENT-STENT Left 2/7/2017    Performed by Torrey Villafana MD at [facility] OR 2ND FLR    PTA, AV FISTULA N/A 1/30/2019    Performed by Torrey Villafana MD at [facility] CATH LAB    stent to fistula      TRANSESOPHAGEAL ECHOCARDIOGRAM (JARAD) N/A 6/27/2016    Performed by Sourav Machuca MD at [facility] CATH LAB    ULTRASOUND, UPPER GI TRACT, ENDOSCOPIC N/A 1/9/2014    Performed by Stewart Burgess MD at [facility] ENDO (2ND FLR)    VASCULAR SURGERY      fistula to L upper arm        Review of patient's allergies indicates:   Allergen Reactions    Bactrim [sulfamethoxazole-trimethoprim] Other (See Comments)     Causes renal failure    Nsaids (non-steroidal anti-inflammatory drug) Other (See Comments)     Renal failure       Family History     Problem Relation (Age of Onset)    Coronary artery disease Father    Diabetes Mother, Father, Maternal Grandmother    Hypertension Mother, Father    Kidney failure Mother    Lupus Sister        Tobacco Use     Smoking status: Former Smoker     Packs/day: 0.50     Years: 10.00     Pack years: 5.00     Types: Cigarettes     Last attempt to quit: 1994     Years since quittin.9    Smokeless tobacco: Never Used   Substance and Sexual Activity    Alcohol use: No     Alcohol/week: 0.0 oz     Comment: rarely    Drug use: No    Sexual activity: Not Currently      Review of Systems   Constitutional: Positive for fatigue. Negative for chills and fever.   HENT: Negative for ear pain, hearing loss, mouth sores and sore throat.    Eyes: Negative for photophobia, pain and visual disturbance.   Respiratory: Positive for shortness of breath. Negative for apnea, cough and chest tightness.    Cardiovascular: Negative for chest pain, palpitations and leg swelling.   Gastrointestinal: Negative for abdominal distention, abdominal pain, anal bleeding, blood in stool, constipation, diarrhea, nausea, rectal pain and vomiting.   Musculoskeletal: Negative for arthralgias and myalgias.   Skin: Negative for rash and wound.   Neurological: Positive for light-headedness. Negative for dizziness, syncope and numbness.     Objective:     Vital Signs (Most Recent):  Temp: 97.8 °F (36.6 °C) (19 030)  Pulse: 68 (19 030)  Resp: 18 (19)  BP: 98/60 (19)  SpO2: 100 % (19) Vital Signs (24h Range):  Temp:  [96.7 °F (35.9 °C)-98.4 °F (36.9 °C)] 97.8 °F (36.6 °C)  Pulse:  [68-72] 68  Resp:  [17-30] 18  SpO2:  [98 %-100 %] 100 %  BP: ()/(57-66) 98/60   Weight: 114.8 kg (253 lb)  Body mass index is 38.47 kg/m².      Intake/Output Summary (Last 24 hours) at 3/29/2019 0315  Last data filed at 3/29/2019 0305  Gross per 24 hour   Intake 600 ml   Output --   Net 600 ml       Physical Exam    Vents:     Lines/Drains/Airways     Drain                 Hemodialysis AV Graft Left upper arm -- days          Peripheral Intravenous Line                 Peripheral IV - Single Lumen 19 Right Antecubital  less than 1 day              Significant Labs:    CBC/Anemia Profile:  Recent Labs   Lab 03/28/19  1840   WBC 12.17   HGB 14.2   HCT 48.3      MCV 95   RDW 18.9*        Chemistries:  Recent Labs   Lab 03/28/19  1840   *   K 4.1   CL 91*   CO2 17*   BUN 18   CREATININE 3.6*   CALCIUM 7.8*   ALBUMIN 3.5   PROT 7.8   BILITOT 1.3*   ALKPHOS 102   ALT 20   AST 38   MG 2.0       Lactic Acid:   Recent Labs   Lab 03/28/19 2037 03/29/19  0220   LACTATE 4.8* 5.5*       Significant Imaging: I have reviewed and interpreted all pertinent imaging results/findings within the past 24 hours.    Assessment/Plan:     Neuro  Seizure disorder  -Seizure precautions  -Continue Keppra 500 mg po BID  -Ativan 2 mg IV q10min PRN for seizures    Pulmonary  CRLD (chronic restrictive lung disease)  -As in pulmonary sarcoidosis    Cardiac/Vascular  Hyperlipidemia  -Continue home atorvastatin 80 mg po qd    Biventricular cardiac pacemaker in situ  -As in permanent atrial fibrillation    Permanent atrial fibrillation  S/p AVN ablation and permanently paced  -[id number] score >2  -warfarin (COUMADIN) tablet 5 mg po qd  -Consulted pharmacist to assistance with dosing  -not on any rate control agents  -continue to monitor on telemetry  -continue to monitor INR daily  -Maintain K > 4, Mg > 2    Chronic combined systolic and diastolic heart failure  -Judicious fluid use  -Hold starting her on Toprol or ACE-i as she is currently dealing with hypotension    Idiopathic hypotension  DDx includes autonomic dysfunction vs under treated adrenal insufficiency vs hypovolemia.  Will consult endocrinology for possible under dosing of her fludrocortisone.  If she does not have under treated adrenal insufficiency then she most likely has autonomic dysfunction.  -Endocrinology consult pending    Renal/  * Lactic acidemia  DDx includes sepsis vs hypovolemia vs adrenal insufficiency.  She may need uptitration of her fludrocortisone but would  appreciate endocrinology's assistance with deciding if this is the correct course of action.  She may be hypovolemic and will give a whole 1 L LR and reassess lactic acidosis with q6h labs.  She does have an elevated procalcitonin, hypotension, tachypnea, and left shift on CBC so sepsis is high on the differential.   Will start on broad spectrum Abx pending blood cultures.  No need for UA as she is aneuric.  -Start Zosyn 4.5 g IV BID  -Patient given 2g vanc in ED, will follow up am random  -Continue midodrine 15 mg po TID  -Continue fludrocortisone 100 mcg po BID  -1 L LR bolus    End stage renal failure on dialysis  -Nephrology consulted for resumption of HD  -Continue sevelamer 800 mg po TID    Immunology/Multi System  Pulmonary sarcoidosis  -Duo-Nebs q4h PRN  -Continue home prednisone 20 mg po qd  -Continue supplemental oxygen    Endocrine  Severe obesity (BMI 35.0-35.9 with comorbidity)  -DM, renal 2000 [place] diet    Diabetes mellitus type II, controlled  -DM diet  -LD SSI  -POCT glucose checks AC & HS    Vitamin D insufficiency  -As in osteopenia     Secondary adrenal insufficiency  -Continue fludrocortisone 100 mcg po BID  -Continue prednisone 20 mg po qd    Orthopedic  Chronic gout due to renal impairment without tophus  -Hold off starting gout, will check uric acid level after lactic acid resolves    Osteopenia determined by x-ray  -Checking calcitriol level prior to starting therapy, will most likely need to start Vit D supp.    Other  AV fistula thrombosis, subsequent encounter  -DC home aspirin 81 mg po qd (RE-DUAL PCI trial)  -Continue Plavix 75 mg po qd    Weakness generalized  -PT/OT    Current chronic use of systemic steroids  -As in pulmonary sarcoidosis        Critical Care Daily Checklist:    A: Awake: RASS Goal/Actual Goal:    Actual:     B: Spontaneous Breathing Trial Performed?     C: SAT & SBT Coordinated?  NA                      D: Delirium: CAM-ICU     E: Early Mobility Performed? Yes   F:  Feeding Goal:    Status:     Current Diet Order   Procedures    Diet diabetic [facility]; 2000 Calorie     Order Specific Question:   Indicate patient location for additional diet options:     Answer:   [facility]     Order Specific Question:   Total calories:     Answer:   2000 Calorie      AS: Analgesia/Sedation NA   T: Thromboembolic Prophylaxis Heparin   H: HOB > 300 Yes   U: Stress Ulcer Prophylaxis (if needed) No   G: Glucose Control LD SSI   B: Bowel Function     I: Indwelling Catheter (Lines & Yepez) Necessity Needed   D: De-escalation of Antimicrobials/Pharmacotherapies No    Plan for the day/ETD Further workup re: idiopathic hypotension & lactic acidosis    Code Status:  Family/Goals of Care: Full Code  Full treatment       Critical secondary to Patient has a condition that poses threat to life and bodily function: Severe Respiratory Distress     Critical care was time spent personally by me on the following activities: development of treatment plan with patient or surrogate and bedside caregivers, discussions with consultants, evaluation of patient's response to treatment, examination of patient, ordering and performing treatments and interventions, ordering and review of laboratory studies, ordering and review of radiographic studies, pulse oximetry, re-evaluation of patient's condition. This critical care time did not overlap with that of any other provider or involve time for any procedures.     Tanner Robledo MD  Critical Care Medicine  [facility]

## 2019-03-29 NOTE — ED NOTES
Hourly rounding complete. Patient resting in stretcher and is in NAD at this time. Pt is awake alert and oriented x4, VSS, respirations even and unlabored. Pt denies pain at this time. Pt updated on POC. Bed low and locked with side rails up x2, call bell in pt reach. Pt voices no needs at this time.    Pt meal tray set up at bedside

## 2019-03-29 NOTE — SUBJECTIVE & OBJECTIVE
Past Medical History:   Diagnosis Date    Anemia in ESRD (end-stage renal disease) 3/7/2016    Anticoagulant long-term use     Cervical radiculopathy 2015    CHF (congestive heart failure)     Chronic combined systolic and diastolic heart failure 2013    EF 20% , improved with Medical therapy, negative PET     Chronic respiratory failure with hypoxia 2013    On home oxygen at 2-5 liters    Closed fracture of proximal end of right fibula 2016    Complications due to renal dialysis device, implant, and graft     DDD (degenerative disc disease), lumbar 2015    Dependence on renal dialysis     Diabetes mellitus type II, controlled 2017    ESRD on hemodialysis 2016    Essential hypertension     Gout     Lateral meniscal tear 2016    Lumbar stenosis 2015    Obesity, Class III, BMI 40-49.9 (morbid obesity)     Pacemaker     Paroxysmal atrial fibrillation 2014    Not on anticoagulation    Peripheral neuropathy 2013    Personal history of gastric ulcer 3/19/2013    Pneumonia of left lower lobe due to infectious organism 3/10/2019    Sarcoidosis, lung 2009    Diagnosed in  with ocular manifestation, on 4L home O2 and PO steroids    Secondary pulmonary hypertension 2015    Seizure disorder 3/19/2013    Shingles 2013    Thyroid disease        Past Surgical History:   Procedure Laterality Date    ABDOMINAL SURGERY      ABLATION N/A 2017    Performed by [facility]imir [facility]orno MD at [facility] CATH LAB    ANGIOPLASTY Left 1/10/2018    Performed by Torrey Villafana MD at [facility] OR 2ND FLR    ANGIOPLASTY-PERCUTANEOUS TRANSLUMINAL (PTA) Left 2017    Performed by [facility] MD at [facility] OR 2ND FLR    ANGIOPLASTY-PERCUTANEOUS TRANSLUMINAL (PTA) Left 10/12/2016    Performed by Torrey [facility]ana MD at [facility] OR 2ND FLR    CARDIAC PACEMAKER PLACEMENT       SECTION      x2    DECLOT GRAFT PERCUTANEOUS Left  2/7/2017    [facility]formed by [facility] at [facility] OR 2ND FLR    DECLOT-GRAFT Left 6/21/2016    Performed by Harjit Starr MD at [facility] CATH LAB    DECLOT-GRAFT Left 6/20/2016    Performed by [facility]it Starr MD at [facility] CATH LAB    ECHOCARDIOGRAM-TRANSESOPHAGEAL N/A 6/27/2013    Performed by Delmy Surgeon at [facility] DELMY    fistulogram Left 1/10/2018    Performed by Torrey [facility]ana MD at [facility] OR 2ND FLR    FISTULOGRAM Left 9/13/2017    Performed by Torrey [facility]ana MD at [facility] CATH LAB    FISTULOGRAM Left 10/12/2016    Performed by Torrey [facility] MD at [facility] OR 2ND FLR    FISTULOGRAM Left 6/21/2016    Performed by Harjit Starr MD at [facility] CATH LAB    Fistulogram, LUE AVG, possible intervention Left 1/30/2019    Performed by Tor[facility]afana MD at [facility] CATH LAB    INJECTION-STEROID-EPIDURAL-TRANSFORAMINAL Right 7/20/2015    Performed [facility] at [facility]    INJECTION-STEROID-EPIDURAL-TRANSFORAMINAL Right 5/21/2015    [facility]    [id number] / Left upper AV graft. Left 2/3/2016    Performed by Torrey [facility]ana MD at [facility] OR 2ND FLR    [id number]-[id number]-[id number] N/A 1/11/2017    Performed by Bladimir Adorno MD at [facility] CATH LAB    OTHER SURGICAL HISTORY      loop recorder implant    PLACEMENT-STENT Left 2/7/2017    Performed by [facility] MD at [facility] OR 2ND FLR    PTA, AV FISTULA N/A 1/30/2019    Performed by [facility] MD at [facility] CATH LAB    stent to fistula      TRANSESOPHAGEAL ECHOCARDIOGRAM (JARAD) N/A 6/27/2016    Performed by Sourav Machuca MD at [facility] CATH LAB    ULTRASOUND, UPPER GI TRACT, ENDOSCOPIC N/A 1/9/2014    Performed by Stewart Burgess MD at [facility] ENDO (2ND FLR)    VASCULAR SURGERY      fistula to L upper arm        Review of patient's allergies indicates:   Allergen Reactions    Bactrim [sulfamethoxazole-trimethoprim] Other (See Comments)     Causes renal  failure    Nsaids (non-steroidal anti-inflammatory drug) Other (See Comments)     Renal failure     Current Facility-Administered Medications   Medication Frequency    atorvastatin tablet 80 mg QHS    clopidogrel tablet 75 mg Daily    fludrocortisone tablet 100 mcg BID    lactated ringers bolus 1,000 mL Once    levETIRAcetam tablet 500 mg BID    lorazepam injection 2 mg Q10 Min PRN    midodrine tablet 15 mg TID    nortriptyline capsule 25 mg QHS    piperacillin-tazobactam 4.5 g in sodium chloride 0.9% 100 mL IVPB (ready to mix system) Q12H    prednisoLONE acetate 1 % ophthalmic suspension 1 drop TID    predniSONE tablet 20 mg Daily    pregabalin capsule 100 mg TID    sevelamer carbonate tablet 800 mg TID WM    sodium chloride 0.9% flush 10 mL PRN    warfarin (COUMADIN) tablet 5 mg Daily     Current Outpatient Medications   Medication    aspirin (ECOTRIN) 81 MG EC tablet    atorvastatin (LIPITOR) 80 MG tablet    cinacalcet (SENSIPAR) 30 MG Tab    clopidogrel (PLAVIX) 75 mg tablet    fludrocortisone (FLORINEF) 0.1 mg Tab    gabapentin (NEURONTIN) 600 MG tablet    levETIRAcetam (KEPPRA) 500 MG Tab    midodrine (PROAMATINE) 5 MG Tab    nortriptyline (PAMELOR) 25 MG capsule    omeprazole (PRILOSEC) 20 MG capsule    patiromer calcium sorbitex (VELTASSA) 16.8 gram PwPk    prednisoLONE acetate (PRED FORTE) 1 % DrpS    predniSONE (DELTASONE) 20 MG tablet    pregabalin (LYRICA) 100 MG capsule    DENISE-LINDA 0.8 mg Tab    RENVELA 800 mg Tab    tiZANidine (ZANAFLEX) 4 MG tablet    TRADJENTA 5 mg Tab tablet    warfarin (COUMADIN) 5 MG tablet    blood sugar diagnostic Strp    clopidogrel (PLAVIX) 75 mg tablet    lancets Misc    nortriptyline (PAMELOR) 25 MG capsule     Family History     Problem Relation (Age of Onset)    Coronary artery disease Father    Diabetes Mother, Father, Maternal Grandmother    Hypertension Mother, Father    Kidney failure Mother    Lupus Sister        Tobacco Use     Smoking status: Former Smoker     Packs/day: 0.50     Years: 10.00     Pack years: 5.00     Types: Cigarettes     Last attempt to quit: 1994     Years since quittin.9    Smokeless tobacco: Never Used   Substance and Sexual Activity    Alcohol use: No     Alcohol/week: 0.0 oz     Comment: rarely    Drug use: No    Sexual activity: Not Currently     Review of Systems   Constitutional: Positive for fatigue. Negative for chills and fever.   HENT: Negative for ear pain, hearing loss, mouth sores and sore throat.    Eyes: Negative for photophobia, pain and visual disturbance.   Respiratory: Positive for shortness of breath. Negative for apnea, cough and chest tightness.    Cardiovascular: Negative for chest pain, palpitations and leg swelling.   Gastrointestinal: Negative for abdominal distention, abdominal pain, anal bleeding, blood in stool, constipation, diarrhea, nausea, rectal pain and vomiting.   Musculoskeletal: Negative for arthralgias and myalgias.   Skin: Negative for rash and wound.   Neurological: Positive for light-headedness. Negative for dizziness, syncope and numbness.     Objective:     Vital Signs (Most Recent):  Temp: 97.5 °F (36.4 °C) (19 1000)  Pulse: 69 (19 1015)  Resp: 16 (19 1015)  BP: 103/63 (19 1015)  SpO2: 100 % (19 1015) Vital Signs (24h Range):  Temp:  [96.7 °F (35.9 °C)-98.4 °F (36.9 °C)] 97.5 °F (36.4 °C)  Pulse:  [68-72] 69  Resp:  [13-30] 16  SpO2:  [97 %-100 %] 100 %  BP: ()/(52-80) 103/63     Weight: 114.8 kg (253 lb) (19 1504)  Body mass index is 38.47 kg/m².  Body surface area is 2.35 meters squared.    I/O last 3 completed shifts:  In: 1200 [IV Piggyback:1200]  Out: -     Physical Exam   Constitutional: She is oriented to person, place, and time. She appears well-developed. No distress. Nasal cannula in place.   HENT:   Head: Normocephalic and atraumatic.   Right Ear: External ear normal.   Left Ear: External ear normal.   Eyes:  Conjunctivae and EOM are normal. Right eye exhibits no discharge. Left eye exhibits no discharge.   Cardiovascular: An irregularly irregular rhythm present. Exam reveals no gallop and no friction rub.   Murmur heard.  Pulmonary/Chest: Effort normal. No respiratory distress. She has no wheezes. She has no rales.   Abdominal: Soft. Bowel sounds are normal. She exhibits no distension. There is no tenderness.   Musculoskeletal: Normal range of motion. She exhibits edema. She exhibits no deformity.   Neurological: She is alert and oriented to person, place, and time.   Skin: Skin is warm and dry. She is not diaphoretic.   Psychiatric: She has a normal mood and affect. Her behavior is normal.       Significant Labs:  CBC:   Recent Labs   Lab 03/29/19  0552   WBC 24.79*   RBC 4.79   HGB 13.2   HCT 44.4      MCV 93   MCH 27.6   MCHC 29.7*     CMP:   Recent Labs   Lab 03/29/19  0552   GLU 61*   CALCIUM 6.5*   ALBUMIN 3.0*   PROT 7.2      K 3.0*   CO2 15*      BUN 16   CREATININE 3.0*   ALKPHOS 92   ALT 15   AST 32   BILITOT 1.2*

## 2019-03-29 NOTE — SUBJECTIVE & OBJECTIVE
PMH, PSH, FH, SH updated and reviewed     Review of Systems   Constitutional: Positive for chills. Negative for unexpected weight change.   Eyes: Negative for visual disturbance.   Respiratory: Positive for shortness of breath.    Cardiovascular: Negative for chest pain.   Gastrointestinal: Negative for abdominal pain.   Genitourinary: Negative for urgency.   Musculoskeletal: Negative for arthralgias.   Skin: Negative for wound.   Neurological: Positive for weakness. Negative for headaches.   Hematological: Does not bruise/bleed easily.   Psychiatric/Behavioral: Negative for sleep disturbance.     PHYSICAL EXAMINATION:  Vitals:    03/29/19 1245   BP: 113/72   Pulse: 69   Resp: 16   Temp:      Body mass index is 38.47 kg/m².    Physical Exam   Constitutional: She is oriented to person, place, and time. She appears well-developed. No distress.   Obese with cushingoid body habitus   HENT:   Right Ear: External ear normal.   Left Ear: External ear normal.   Nose: Nose normal.   Hearing grossly normal  Dentition grossly normal   Eyes: Conjunctivae are normal. Right eye exhibits no discharge. Left eye exhibits no discharge.   Cardiovascular: Normal rate and regular rhythm.   Pulmonary/Chest: Effort normal. No respiratory distress.   Musculoskeletal: She exhibits no edema.   No digital clubbing or extremity cyanosis   Neurological: She is alert and oriented to person, place, and time. Coordination normal.   Skin: No rash noted.   R anterior shin ulcer   Psychiatric: She has a normal mood and affect. Her behavior is normal.   Alert and oriented to person, place, and situation.   Nursing note and vitals reviewed.

## 2019-03-29 NOTE — SUBJECTIVE & OBJECTIVE
Past Medical History:   Diagnosis Date    Anemia in ESRD (end-stage renal disease) 3/7/2016    Anticoagulant long-term use     Cervical radiculopathy 2015    CHF (congestive heart failure)     Chronic combined systolic and diastolic heart failure 2013    EF 20% , improved with Medical therapy, negative PET     Chronic respiratory failure with hypoxia 2013    On home oxygen at 2-5 liters    Closed fracture of proximal end of right fibula 2016    Complications due to renal dialysis device, implant, and graft     DDD (degenerative disc disease), lumbar 2015    Dependence on renal dialysis     Diabetes mellitus type II, controlled 2017    ESRD on hemodialysis 2016    Essential hypertension     Gout     Lateral meniscal tear 2016    Lumbar stenosis 2015    Obesity, Class III, BMI 40-49.9 (morbid obesity)     Pacemaker     Paroxysmal atrial fibrillation 2014    Not on anticoagulation    Peripheral neuropathy 2013    Personal history of gastric ulcer 3/19/2013    Pneumonia of left lower lobe due to infectious organism 3/10/2019    Sarcoidosis, lung 2009    Diagnosed in  with ocular manifestation, on 4L home O2 and PO steroids    Secondary pulmonary hypertension 2015    Seizure disorder 3/19/2013    Shingles 2013    Thyroid disease        Past Surgical History:   Procedure Laterality Date    ABDOMINAL SURGERY      ABLATION N/A 2017    [facility]formed [facility] at [facility] CATH LAB    ANGIOPLASTY Left 1/10/2018    Performed by Torrey [facility] MD at [facility] OR 2ND FLR    ANGIOPLASTY-PERCUTANEOUS TRANSLUMINAL (PTA) Left 2017    Performed by [facility] MD at [facility] OR 2ND FLR    ANGIOPLASTY-PERCUTANEOUS TRANSLUMINAL (PTA) Left 10/12/2016    Performed by [facility] MD at [facility] OR 2ND FLR    CARDIAC PACEMAKER PLACEMENT       SECTION      x2    DECLOT GRAFT PERCUTANEOUS Left  2/7/2017    [facility]formed by [facility] MD at [facility] OR 2ND FLR    DECLOT-GRAFT Left 6/21/2016    Performed by Harjit Starr MD at [facility] CATH LAB    DECLOT-GRAFT Left 6/20/2016    Performed by Harjit Starr MD at [facility] CATH LAB    ECHOCARDIOGRAM-TRANSESOPHAGEAL N/A 6/27/2013    Performed by Delmy Surgeon at [facility] DELMY    fistulogram Left 1/10/2018    Performed by Torrey Vill[facility]ana MD at [facility] OR 2ND FLR    FISTULOGRAM Left 9/13/2017    Performed by Torrey Villafana MD at [facility] CATH LAB    FISTULOGRAM Left 10/12/2016    Performed by Torrey Villafana MD at [facility] OR 2ND FLR    FISTULOGRAM Left 6/21/2016    Performed by Harjit Starr MD at [facility] CATH LAB    Fistulogram, LUE AVG, possible intervention Left 1/30/2019    Performed by Torrey Villafana MD at [facility] CATH LAB    INJECTION-STEROID-EPIDURAL-TRANSFORAMINAL Right 7/20/2015    Performed by [facility] at [facility] PAIN MGT    INJECTION-STEROID-EPIDURAL-TRANSFORAMINAL Right 5/21/2015    [facility]    [id number]-[id number] / Left upper AV graft. Left 2/3/2016    Performed by Torrey Villafana MD at [facility] OR 2ND FLR    [id number]-[id number] N/A 1/11/2017    Performed by Bladimir Adorno MD at [facility] CATH LAB    OTHER SURGICAL HISTORY      loop recorder implant    PLACEMENT-STENT Left 2/7/2017    Performed by Torrey Villafana MD at [facility] OR 2ND FLR    PTA, AV FISTULA N/A 1/30/2019    Performed by Torrey Villafana MD at [facility] CATH LAB    stent to fistula      TRANSESOPHAGEAL ECHOCARDIOGRAM (JARAD) N/A 6/27/2016    Performed by Sourav Machuca MD at [facility] CATH LAB    ULTRASOUND, UPPER GI TRACT, ENDOSCOPIC N/A 1/9/2014    Performed by Stewart Burgess MD at [facility] ENDO (2ND FLR)    VASCULAR SURGERY      fistula to L upper arm        Review of patient's allergies indicates:   Allergen Reactions    Bactrim [sulfamethoxazole-trimethoprim] Other (See Comments)     Causes renal  failure    Nsaids (non-steroidal anti-inflammatory drug) Other (See Comments)     Renal failure       Family History     Problem Relation (Age of Onset)    Coronary artery disease Father    Diabetes Mother, Father, Maternal Grandmother    Hypertension Mother, Father    Kidney failure Mother    Lupus Sister        Tobacco Use    Smoking status: Former Smoker     Packs/day: 0.50     Years: 10.00     Pack years: 5.00     Types: Cigarettes     Last attempt to quit: 1994     Years since quittin.9    Smokeless tobacco: Never Used   Substance and Sexual Activity    Alcohol use: No     Alcohol/week: 0.0 oz     Comment: rarely    Drug use: No    Sexual activity: Not Currently      Review of Systems   Constitutional: Positive for fatigue. Negative for chills and fever.   HENT: Negative for ear pain, hearing loss, mouth sores and sore throat.    Eyes: Negative for photophobia, pain and visual disturbance.   Respiratory: Positive for shortness of breath. Negative for apnea, cough and chest tightness.    Cardiovascular: Negative for chest pain, palpitations and leg swelling.   Gastrointestinal: Negative for abdominal distention, abdominal pain, anal bleeding, blood in stool, constipation, diarrhea, nausea, rectal pain and vomiting.   Musculoskeletal: Negative for arthralgias and myalgias.   Skin: Negative for rash and wound.   Neurological: Positive for light-headedness. Negative for dizziness, syncope and numbness.     Objective:     Vital Signs (Most Recent):  Temp: 97.8 °F (36.6 °C) (19 0305)  Pulse: 68 (19 0305)  Resp: 18 (19 030)  BP: 98/60 (19 0305)  SpO2: 100 % (19 0305) Vital Signs (24h Range):  Temp:  [96.7 °F (35.9 °C)-98.4 °F (36.9 °C)] 97.8 °F (36.6 °C)  Pulse:  [68-72] 68  Resp:  [17-30] 18  SpO2:  [98 %-100 %] 100 %  BP: ()/(57-66) 98/60   Weight: 114.8 kg (253 lb)  Body mass index is 38.47 kg/m².      Intake/Output Summary (Last 24 hours) at 3/29/2019 0315  Last  data filed at 3/29/2019 0305  Gross per 24 hour   Intake 600 ml   Output --   Net 600 ml       Physical Exam    Vents:     Lines/Drains/Airways     Drain                 Hemodialysis AV Graft Left upper arm -- days          Peripheral Intravenous Line                 Peripheral IV - Single Lumen 03/28/19 1825 Right Antecubital less than 1 day              Significant Labs:    CBC/Anemia Profile:  Recent Labs   Lab 03/28/19  1840   WBC 12.17   HGB 14.2   HCT 48.3      MCV 95   RDW 18.9*        Chemistries:  Recent Labs   Lab 03/28/19  1840   *   K 4.1   CL 91*   CO2 17*   BUN 18   CREATININE 3.6*   CALCIUM 7.8*   ALBUMIN 3.5   PROT 7.8   BILITOT 1.3*   ALKPHOS 102   ALT 20   AST 38   MG 2.0       Lactic Acid:   Recent Labs   Lab 03/28/19 2037 03/29/19  0220   LACTATE 4.8* 5.5*       Significant Imaging: I have reviewed and interpreted all pertinent imaging results/findings within the past 24 hours.

## 2019-03-30 PROBLEM — I95.1 ORTHOSTATIC HYPOTENSION: Status: ACTIVE | Noted: 2019-01-01

## 2019-03-30 NOTE — PLAN OF CARE
Problem: Occupational Therapy Goal  Goal: Occupational Therapy Goal  Goals to be met by: 04/30/2019     Patient will increase functional independence with ADLs by performing:    UE Dressing with Modified Holmes with use of adaptive technique.  LE Dressing with Holmes.  Step transfer with Modified Holmes  Toilet transfer to toilet with Modified Holmes.  Increased functional strength to WFL for self care.  Upper extremity exercise program x8 reps per handout, with independence.    Outcome: Ongoing (interventions implemented as appropriate)  Pt would benefit from continued OT to address deficits in self care and functional mobility. Recommending Home vs HHOT pending progress; DME needs SC

## 2019-03-30 NOTE — PROGRESS NOTES
Chronic Hd treatment started and no complications with access to left upper arm. Lines secured and telemetry in place.

## 2019-03-30 NOTE — NURSING
To dialysis at this time per stretcher alert and oriented. Denied pain. Assessment on going.abdominal binder on.

## 2019-03-30 NOTE — PROGRESS NOTES
[facility] Medicine  Progress Note    Patient Name: Sisi Medel  MRN: [id number]  Patient Class: IP- Inpatient   Admission Date: 3/28/2019  Length of Stay: 1 days  Attending Physician: BOB Thompson MD  Primary Care Provider: Carlos A Caputo MD    [facility] Medicine Team: [facility]OSP MED S DWIGHT Thompson MD    Subjective:     Principal Problem:Lactic acidemia    HPI: Ms. Patricio is a pleasant 59 yo lady w/ pulmonary sarcoidosis on 5 L home oxygen, Class 3 HFrEF (3/2019 EF 35%), perm Afib/flutter s/p 6/2017 AVN ablation & PPM, ESRD (TTS), Type 2 DM (3/2019 A1c 6.2%) and recently diagnosed renal insufficiency secondary to chronic steroid therapy (now on pred 20 qd) who presents today c/o orthostatic hypotension.  She was recently admitted from 3/24 - 3/26 where she was having similar complaints despite being on midodrine 15 mg po TID and found to have adrenal insufficiency.  During that admission her prednisone was increased from 10 to 20 mg po qd with improvement in her symptoms.  The day after discharge she reports recurrent orthostatic hypotension and dyspnea on exertion with her activity now limited to less than a half block.  Today her home health nurse and home PT found her SBP to be in the 70s whenever she was standing or active.  On evaluation she reports that her light headedness is not present when she is laying down.  In the ED she was given vanc & zosyn for possible sepsis and ICU consulted for lactic acidosis.  She was given 500 cc LR but her lactic acid still increased to 5.5 from 4.8. She will be admitted to MICU for further monitoring.    Interval History:     Review of Systems   Constitutional: Positive for activity change, diaphoresis and fatigue. Negative for chills and fever.   HENT: Negative for congestion.    Respiratory: Positive for shortness of breath. Negative for cough.    Cardiovascular: Negative for chest pain.   Gastrointestinal: Negative for  abdominal pain, constipation, diarrhea, nausea and vomiting.   Neurological: Positive for dizziness and light-headedness. Negative for weakness.   Psychiatric/Behavioral: Negative for confusion. The patient is not nervous/anxious.      Objective:     Vital Signs (Most Recent):  Temp: 97.4 °F (36.3 °C) (03/30/19 0742)  Pulse: 70 (03/30/19 0742)  Resp: 19 (03/30/19 0742)  BP: 100/66 (03/30/19 0742)  SpO2: 100 % (03/30/19 0742) Vital Signs (24h Range):  Temp:  [97.4 °F (36.3 °C)-98.5 °F (36.9 °C)] 97.4 °F (36.3 °C)  Pulse:  [69-80] 70  Resp:  [12-20] 19  SpO2:  [95 %-100 %] 100 %  BP: ()/(55-88) 100/66     Weight: 118 kg (260 lb 3.2 oz)  Body mass index is 39.56 kg/m².    Intake/Output Summary (Last 24 hours) at 3/30/2019 0904  Last data filed at 3/30/2019 0600  Gross per 24 hour   Intake 820 ml   Output --   Net 820 ml      Physical Exam   Constitutional: She is oriented to person, place, and time. She appears well-developed and well-nourished.  Non-toxic appearance. She does not have a sickly appearance. She does not appear ill. No distress.   Morbid obesity   HENT:   Head: Normocephalic and atraumatic.   Mouth/Throat: No oropharyngeal exudate.   Eyes: Pupils are equal, round, and reactive to light. Conjunctivae and EOM are normal. Right eye exhibits no discharge. Left eye exhibits no discharge. No scleral icterus.   Neck: Normal range of motion. Neck supple. No JVD present. No tracheal deviation present. No thyromegaly present.   Cardiovascular: Normal rate, regular rhythm and intact distal pulses. Exam reveals no gallop and no friction rub.   Murmur heard.   Systolic murmur is present with a grade of 2/6.  AVG to Left brachial area      Pulmonary/Chest: Effort normal and breath sounds normal. No accessory muscle usage or stridor. No tachypnea and no bradypnea. No respiratory distress. She has no decreased breath sounds. She has no wheezes. She has no rhonchi. She has no rales. She exhibits no tenderness.    Abdominal: Soft. Bowel sounds are normal. She exhibits no distension and no mass. There is no tenderness. There is no rebound and no guarding.   Genitourinary:   Genitourinary Comments: No ace in place   Musculoskeletal: Normal range of motion. She exhibits no edema or tenderness.   Lymphadenopathy:     She has no cervical adenopathy.   Trace to 1+ edema to legs   Neurological: She is alert and oriented to person, place, and time. She displays normal reflexes. No cranial nerve deficit. She exhibits normal muscle tone. Coordination normal. GCS eye subscore is 4. GCS verbal subscore is 5. GCS motor subscore is 6.   Skin: Skin is warm and dry. No rash noted. She is not diaphoretic. No erythema. No pallor.   Some superficial ulcers to right lower leg   Psychiatric: She has a normal mood and affect. Her speech is normal and behavior is normal. Judgment and thought content normal. Cognition and memory are normal.   Nursing note and vitals reviewed.      Significant Labs:   Recent Results (from the past 24 hour(s))   Lactic acid, plasma    Collection Time: 03/29/19 10:43 AM   Result Value Ref Range    Lactate (Lactic Acid) 2.7 (H) 0.5 - 2.2 mmol/L   Renal function panel    Collection Time: 03/29/19 11:03 AM   Result Value Ref Range    Glucose 191 (H) 70 - 110 mg/dL    Sodium 132 (L) 136 - 145 mmol/L    Potassium 4.4 3.5 - 5.1 mmol/L    Chloride 91 (L) 95 - 110 mmol/L    CO2 25 23 - 29 mmol/L    BUN, Bld 31 (H) 6 - 20 mg/dL    Calcium 7.2 (L) 8.7 - 10.5 mg/dL    Creatinine 5.1 (H) 0.5 - 1.4 mg/dL    Albumin 2.8 (L) 3.5 - 5.2 g/dL    Phosphorus 3.5 2.7 - 4.5 mg/dL    eGFR if  10.1 (A) >60 mL/min/1.73 m^2    eGFR if non  8.7 (A) >60 mL/min/1.73 m^2    Anion Gap 16 8 - 16 mmol/L   Comprehensive metabolic panel    Collection Time: 03/29/19 12:08 PM   Result Value Ref Range    Sodium 132 (L) 136 - 145 mmol/L    Potassium 4.8 3.5 - 5.1 mmol/L    Chloride 91 (L) 95 - 110 mmol/L    CO2 26 23 - 29  mmol/L    Glucose 184 (H) 70 - 110 mg/dL    BUN, Bld 31 (H) 6 - 20 mg/dL    Creatinine 5.2 (H) 0.5 - 1.4 mg/dL    Calcium 7.4 (L) 8.7 - 10.5 mg/dL    Total Protein 6.6 6.0 - 8.4 g/dL    Albumin 2.8 (L) 3.5 - 5.2 g/dL    Total Bilirubin 1.4 (H) 0.1 - 1.0 mg/dL    Alkaline Phosphatase 84 55 - 135 U/L    AST 36 10 - 40 U/L    ALT 15 10 - 44 U/L    Anion Gap 15 8 - 16 mmol/L    eGFR if African American 9.8 (A) >60 mL/min/1.73 m^2    eGFR if non African American 8.5 (A) >60 mL/min/1.73 m^2   Comprehensive metabolic panel    Collection Time: 03/30/19  4:40 AM   Result Value Ref Range    Sodium 131 (L) 136 - 145 mmol/L    Potassium 4.8 3.5 - 5.1 mmol/L    Chloride 93 (L) 95 - 110 mmol/L    CO2 22 (L) 23 - 29 mmol/L    Glucose 61 (L) 70 - 110 mg/dL    BUN, Bld 50 (H) 6 - 20 mg/dL    Creatinine 6.6 (H) 0.5 - 1.4 mg/dL    Calcium 7.7 (L) 8.7 - 10.5 mg/dL    Total Protein 6.3 6.0 - 8.4 g/dL    Albumin 2.8 (L) 3.5 - 5.2 g/dL    Total Bilirubin 1.0 0.1 - 1.0 mg/dL    Alkaline Phosphatase 79 55 - 135 U/L    AST 28 10 - 40 U/L    ALT 15 10 - 44 U/L    Anion Gap 16 8 - 16 mmol/L    eGFR if African American 7.4 (A) >60 mL/min/1.73 m^2    eGFR if non African American 6.4 (A) >60 mL/min/1.73 m^2   Magnesium    Collection Time: 03/30/19  4:40 AM   Result Value Ref Range    Magnesium 2.1 1.6 - 2.6 mg/dL   Phosphorus    Collection Time: 03/30/19  4:40 AM   Result Value Ref Range    Phosphorus 5.0 (H) 2.7 - 4.5 mg/dL   CBC auto differential    Collection Time: 03/30/19  4:40 AM   Result Value Ref Range    WBC 14.02 (H) 3.90 - 12.70 K/uL    RBC 3.95 (L) 4.00 - 5.40 M/uL    Hemoglobin 10.8 (L) 12.0 - 16.0 g/dL    Hematocrit 34.9 (L) 37.0 - 48.5 %    MCV 88 82 - 98 fL    MCH 27.3 27.0 - 31.0 pg    MCHC 30.9 (L) 32.0 - 36.0 g/dL    RDW 18.0 (H) 11.5 - 14.5 %    Platelets 174 150 - 350 K/uL    MPV 12.8 9.2 - 12.9 fL    Immature Granulocytes 0.9 (H) 0.0 - 0.5 %    Gran # (ANC) 10.8 (H) 1.8 - 7.7 K/uL    Immature Grans (Abs) 0.13 (H) 0.00 - 0.04  K/uL    Lymph # 1.2 1.0 - 4.8 K/uL    Mono # 1.8 (H) 0.3 - 1.0 K/uL    Eos # 0.1 0.0 - 0.5 K/uL    Baso # 0.02 0.00 - 0.20 K/uL    nRBC 0 0 /100 WBC    Gran% 76.8 (H) 38.0 - 73.0 %    Lymph% 8.6 (L) 18.0 - 48.0 %    Mono% 12.9 4.0 - 15.0 %    Eosinophil% 0.7 0.0 - 8.0 %    Basophil% 0.1 0.0 - 1.9 %    Differential Method Automated    Protime-INR    Collection Time: 03/30/19  4:40 AM   Result Value Ref Range    Prothrombin Time 25.3 (H) 9.0 - 12.5 sec    INR 2.6 (H) 0.8 - 1.2       Significant Imaging:        XR CHEST AP PORTABLE    CLINICAL HISTORY:  hypotension;    TECHNIQUE:  Single frontal view of the chest was performed.    COMPARISON:  Chest radiograph 03/24/2019    FINDINGS:  No detrimental change.  Monitoring leads overlie the chest.  Resolution is limited by body habitus with underpenetration.    Right chest dual lead cardiac device is unchanged.  Cardiomediastinal silhouette remains midline and stable without convincing evidence of failure, noting overall improved aeration of both lungs from 03/24/2019 exam.  No large pleural effusion or pneumothorax.  No definite new focal opacity.  No acute osseous process seen.  PA and lateral views can be obtained.      Impression       Cardiac device without detrimental change or radiographic acute intrathoracic process seen on this limited single view.      Electronically signed by: Jeancarlos Kohler MD  Date: 03/28/2019     28-MAR-2019 15:29:37 EKG    Atrial fibrillation  Vent. rate 56 BPM  SD interval 186 ms  QRS duration 180 ms  QT/QTc 508/490 ms  Ventricular-paced rhythm  Premature ventricular complexes  Abnormal ECG  When compared with ECG of 24-MAR-2019 16:03,  No significant change was found  Confirmed by JOEL RUSSELL MD ([id number]) on 3/29/2019 7:16:21 PM    Assessment/Plan:      Recurrent, severe orthostatic hypotension  -She was taking her midodrine 15mg PO QD rather than TID as needed   -Currently on midodrine tablet 15 mg, 15 mg, Oral, TID  -Plan for GUICHO hose and  abdominal binder  -Consult Cardiology for any other possible assistance in this difficult case  -fludrocortisone tablet 100 mcg, 100 mcg, Oral, BID    Chronic combined Systolic / Diastolic Heart Failure:  -Most recent echo on (3/11) reveals an EF of 35% with diastolic dysfunction  -Difficult case as severe orthostatic hypotension precluding proper therapy  -Continue symptomatic management with HD as pt is anuric.   -She is not on BB, ACE/ARB or spironolactone due to hypotension   -Continue to monitor on telemetry  -Monitor intake and output and obtain daily STANDING weights  -Fluid restriction to 1.5L per day and cardiac diet with salt restriction     Secondary adrenal insufficiency  -this has been a chronic issue for patient but had recently become much worse  -prednisone increased to 20mg daily last admit  -fludrocortisone tablet 100 mcg, 100 mcg, Oral, BID  -endocrine recommendations noted and appreciated  -Per Endocrine:  -Recommend reducing PDN down to 10 mg once daily at discharge, which is her maintenance dose.  -Okay to continue fludrocortisone 100 mcg daily, but this isn't necessary from an adrenal standpoint.  -Patient has been on high dose prednisone for ~ the past 5 years           -Due for repeat DXA, which can be done outpatient     End stage renal failure on dialysis  -Nephrology consulted for HD: due today  -Continue to monitor renal function with daily labs   -Avoid nephrotoxins  -Renally dose all medications and renal diet  -Monitor events that may lead to decreased renal perfusion (hypovolemia, hypotension, sepsis).   -sevelamer carbonate tablet 800 mg, 800 mg, Oral, TID WM     Atrial Fibrillation  Pacemaker in situ  -Pt is currently rate controlled with HR <110   -In afib but V-paced  -[id number] score >2  -warfarin (COUMADIN) tablet 5 mg, 5 mg, Oral, Daily  -not on any rate control agents  -continue to monitor on telemetry  -continue to monitor INR daily: 2.6 today  -Maintain K > 4, Mg > 2      Chronic respiratory failure with hypoxia  -due to pulmonary sarcoidosis  -O2 at 2L to titrate down or up as needed  -oxygen to keep sats >90%  -albuterol/ipratropium nebs q4H PRN wheezing and sob      Diabetes Mellitus 2, uncontrolled, neuropathy  -Last HbA1c: 6.2 on 3/10/19  -SSI with accuchecks qACHS  -BG goal: Preprandial blood glucose target <140 mg/dL, Random glucoses <180 mg/dL  -2000 ADA diet   -nortriptyline capsule 25 mg, 25 mg, Oral, QHS  -pregabalin capsule 100 mg, 100 mg, Oral, TID  -gabapentin capsule 600 mg, 600 mg, Oral, Daily      Hyperlipidemia  -chronic and stable  -continue atorvastatin tablet 80 mg, 80 mg, Oral, QHS     Pulmonary hypertension  -follows with pulmonology here  -continue home O2     Morbid obesity with BMI of 40.0-44.9, adult  -counseled on diet and exercise     Seizure disorder  -chronic and stable  -continue levETIRAcetam tablet 500 mg, 500 mg, Oral, BID  -maintain seizure precautions     Pulmonary sarcoidosis  -continue steroids              -predniSONE tablet 20 mg, 20 mg, Oral, Daily  -continue home oxygen     GERD  -continue pantoprazole EC tablet 40 mg, 40 mg, Oral, Daily     Recurrent graft stenosis  -aspirin EC tablet 81 mg, 81 mg, Oral, Daily  -clopidogrel tablet 75 mg, 75 mg, Oral, Daily    Anemia in ESRD  -Epo as per Nephrology with HD  -Ordered B12, folate, Fe studies as has been a while     Leukocytosis  -Resolved  -Suspect de-margination from stress of acute hypotension  -No evidence to support infection    VTE Risk Mitigation (From admission, onward)        Ordered     warfarin (COUMADIN) tablet 5 mg  Daily      03/29/19 0338     IP VTE HIGH RISK PATIENT  Once      03/29/19 0338             DWIGHT Thompson MD  Department of Hospital Medicine   [facility]

## 2019-03-30 NOTE — PROGRESS NOTES
HD treatment complete. Duration of treatment 3.5 hours and 3 L removed. Treatment was tolerated well and no complications with access to left upper arm. Needles removed and hemostasis achieved. Dressing intact and no drainage noted. Thrill and bruit present. Dressing applied to left heel.

## 2019-03-30 NOTE — PLAN OF CARE
Problem: Physical Therapy Goal  Goal: Physical Therapy Goal  Outcome: Outcome(s) achieved Date Met: 03/30/19  No goals set, pt does not require additional acute PT services at this time due to baseline functional mobility.     Pt educated on asking medical staff for PT consult if changes in functional status occurs.     - Cirilo Felix, PT, DPT  3/30/2019

## 2019-03-30 NOTE — PT/OT/SLP EVAL
"Occupational Therapy   Evaluation    Name: Sisi Medel  MRN: [id number]  Admitting Diagnosis:  Lactic acidemia      Recommendations:     Discharge Recommendations: (Home vs HHOT pending progress)  Discharge Equipment Recommendations:  shower chair  Barriers to discharge:       Assessment:     Sisi Medel is a 58 y.o. female with a medical diagnosis of Lactic acidemia.  She presents with orthostatic hypotension limiting OOB activity this date; RN ordered abdominal binder/GUICHO hose for OOCB activity but not present at session. Pt agreeable to treatment with no c/o dizziness and states, "I can get up and walk, I'm fine. I just worked with PT." Pt educated on use of abdominal binder and remained seated EOB for session 2/2 continued concerns for orthostatic hypotension. Performance deficits affecting function: impaired endurance, impaired self care skills, impaired functional mobilty, decreased upper extremity function, decreased lower extremity function, impaired cardiopulmonary response to activity.      Rehab Prognosis: Good; patient would benefit from acute skilled OT services to address these deficits and reach maximum level of function.       Plan:     Patient to be seen 2 x/week to address the above listed problems via self-care/home management, therapeutic exercises, therapeutic activities  · Plan of Care Expires: 04/30/19  · Plan of Care Reviewed with: patient    Subjective     Chief Complaint: "I need my dialysis. I can't miss it."  Patient/Family Comments/goals: To return to PLOF and to have dialysis today    Occupational Profile:  Living Environment: Pt lives with friend in [facility], 5STE, BHR, tub. combo  Previous level of function: Mod I to indep with ADLs  Roles and Routines: caretaker to self and home  Equipment Used at Home:  oxygen, rollator, cane, quad, walker, rolling, bedside commode  Assistance upon Discharge: friends/family    Pain/Comfort:  · Pain Rating 1: 0/10    Patients " cultural, spiritual, Amish conflicts given the current situation:      Objective:     Communicated with: hunter prior to session.  Patient found HOB elevated with peripheral IV, oxygen upon OT entry to room.    General Precautions: Standard, fall   Orthopedic Precautions:N/A   Braces: N/A     Occupational Performance:    Bed Mobility:    · Patient completed Rolling/Turning to Right with supervision  · Patient completed Scooting/Bridging with supervision  · Patient completed Supine to Sit with supervision  · Patient completed Sit to Supine with supervision    Functional Mobility/Transfers:  · Not tested this date as medical team order abdominal binder when OOB but binder had not yet been delivered  · Functional Mobility: Pt with good dynamic seated balance    Activities of Daily Living:  · Upper Body Dressing: contact guard assistance and to don gown as robe with education on adaptive dressing techniques 2/2 LUE limited PROM and AROM  · Lower Body Dressing: stand by assistance to don/doff B socks    Cognitive/Visual Perceptual:  Cognitive/Psychosocial Skills:     -       Oriented to: Person, Place, Time and Situation   -       Follows Commands/attention:Follows multistep  commands  -       Communication: clear/fluent  -       Memory: No Deficits noted  -       Safety awareness/insight to disability: intact   -       Mood/Affect/Coping skills/emotional control: Appropriate to situation    Physical Exam:  Postural examination/scapula alignment:    -       Rounded shoulders  Sensation:    -       Intact  Motor Planning:    -       WFL  Dominant hand:    -       R handed  Upper Extremity Range of Motion:   BUE WFL except B shoulder flexion limited ~0-100* AROM; LUE limited ~0-100* PROM  Upper Extremity Strength: BUE grossly 4- to 4/5    Strength:  B hands 4/5  Fine Motor Coordination:    -       Intact  Gross motor coordination:   WFL    AMPAC 6 Click ADL:  AMPAC Total Score: 21    Treatment & Education:  Pt  educated on role of OT and POC.   Pt performing skills as listed above.  Pt educated on adaptive dressing technique to don L sleeve first as pt states it is difficult for her to don UE clothing at times. Pt educated on purpose of abdominal binder and verbalized understanding.    Education:    Patient left seated EOB with all lines intact and call button in reach    GOALS:   Multidisciplinary Problems     Occupational Therapy Goals        Problem: Occupational Therapy Goal    Goal Priority Disciplines Outcome Interventions   Occupational Therapy Goal     OT, PT/OT     Description:  Goals to be met by: 04/30/2019     Patient will increase functional independence with ADLs by performing:    UE Dressing with Modified [place] with use of adaptive technique.  LE Dressing with [place].  Step transfer with Modified [place]  Toilet transfer to toilet with Modified [place].  Increased functional strength to WFL for self care.  Upper extremity exercise program x8 reps per handout, with independence.                      History:     Past Medical History:   Diagnosis Date    Anemia in ESRD (end-stage renal disease) 3/7/2016    Anticoagulant long-term use     Cervical radiculopathy 7/20/2015    CHF (congestive heart failure)     Chronic combined systolic and diastolic heart failure 6/14/2013    EF 20% 2013, improved with Medical therapy, negative PET 2013    Chronic respiratory failure with hypoxia 04/22/2013    On home oxygen at 2-5 liters    Closed fracture of proximal end of right fibula 6/27/2016    Complications due to renal dialysis device, implant, and graft     DDD (degenerative disc disease), lumbar 6/17/2015    Dependence on renal dialysis     Diabetes mellitus type II, controlled 12/8/2017    ESRD on hemodialysis 2/23/2016    Essential hypertension     Gout     Lateral meniscal tear 5/31/2016    Lumbar stenosis 6/17/2015    Obesity, Class III, BMI 40-49.9 (morbid obesity)      Pacemaker     Paroxysmal atrial fibrillation 2014    Not on anticoagulation    Peripheral neuropathy 2013    Personal history of gastric ulcer 3/19/2013    Pneumonia of left lower lobe due to infectious organism 3/10/2019    Sarcoidosis, lung 2009    Diagnosed in  with ocular manifestation, on 4L home O2 and PO steroids    Secondary pulmonary hypertension 2015    Seizure disorder 3/19/2013    Shingles 2013    Thyroid disease        Past Surgical History:   Procedure Laterality Date    ABDOMINAL SURGERY      ABLATION N/A 2017    Performed by Bladimir [facility]orno MD at [facility] CATH LAB    ANGIOPLASTY Left 1/10/2018    Performed by Torrey Villafana MD at [facility] OR 2ND FLR    ANGIOPLASTY-PERCUTANEOUS TRANSLUMINAL (PTA) Left 2017    Performed by Torrey [facility]ana MD at [facility] OR [facility] FLR    ANGIOPLASTY-PERCUTANEOUS TRANSLUMINAL (PTA) Left 10/12/2016    Performed by Torrey Villafana MD at [facility] OR 2ND FLR    CARDIAC PACEMAKER PLACEMENT       SECTION      x2    DECLOT GRAFT PERCUTANEOUS Left 2017    Performed by Torrey Villafana MD at [facility] OR 2ND FLR    DECLOT-GRAFT Left 2016    Performed by Harjit Starr MD at [facility] CATH LAB    DECLOT-GRAFT Left 2016    Performed by Harjit Starr MD at [facility] CATH LAB    ECHOCARDIOGRAM-TRANSESOPHAGEAL N/A 2013    Performed by Delmy Surgeon at [facility] DELMY    fistulogram Left 1/10/2018    Performed by Torrey Villafana MD at [facility] OR 2ND FLR    FISTULOGRAM Left 2017    Performed by Torrey Villafana MD at [facility] CATH LAB    FISTULOGRAM Left 10/12/2016    Performed by Torrey Villafana MD at [facility] OR 2ND FLR    FISTULOGRAM Left 2016    Performed by Harjit Starr MD at [facility] CATH LAB    Fistulogram, LUE AVG, possible intervention Left 2019    Performed by Torrey Villafana MD at [facility] CATH LAB    INJECTION-STEROID-EPIDURAL-TRANSFORAMINAL Right 2015    Performed by Patria Gutierrez  [facility] at [facility] PAIN MGT    INJECTION-STEROID-EPIDURAL-TRANSFORAMINAL Right 5/21/2015    [facility] at [facility]    XDPHGJISP-ZNDJO-HXOQXMMVETYWX / Left upper AV graft. Left 2/3/2016    Performed by [facility] MD at [facility] OR 2ND FLR    GQFCBPTIS-BSKIHAGAP-VWIAHYCPAACMA N/A 1/11/2017    Performed by Bladimir Adorno MD at [facility] CATH LAB    OTHER SURGICAL HISTORY      loop recorder implant    PLACEMENT-STENT Left 2/7/2017    Performed by Torrey Villafana MD at [facility] OR 2ND FLR    PTA, AV FISTULA N/A 1/30/2019    Performed by Torrey Villafana MD at [facility] CATH LAB    stent to fistula      TRANSESOPHAGEAL ECHOCARDIOGRAM (JARAD) N/A 6/27/2016    Performed by Sourav Machuca MD at [facility] CATH LAB    ULTRASOUND, UPPER GI TRACT, ENDOSCOPIC N/A 1/9/2014    Performed by Stewart Burgess MD at [facility] ENDO (2ND FLR)    VASCULAR SURGERY      fistula to L upper arm        Time Tracking:     OT Date of Treatment: 03/30/19  OT Start Time: 0923  OT Stop Time: 0939  OT Total Time (min): 16 min    Billable Minutes:Evaluation 16    Melodie Mcdonald OT  3/30/2019

## 2019-03-30 NOTE — NURSING
Anneliese called report and stated patient returning to unit at this time. She had dialysis for 3 .5 hours and 3 liters and stated her blood pressure was 118/63 and heart rate was 50 and blood sugar ws 138 fifteen minutes ago. Assessment to continue on return to unit.

## 2019-03-30 NOTE — PLAN OF CARE
Reviewed chart:    No further hypotension since scheduled midodrine initiated.    See my note from yesterday for final recommendations.    Endocrine will sign off, please call or reconsult with questions.

## 2019-03-30 NOTE — PROGRESS NOTES
Patient currently on hemodialysis for removal of uremic toxins and volume control.   I personally saw and examined the patient on hemodialysis.  The patient is tolerating the treatment, see hemodyalisis flow sheet for vitals and assessemts. I also reviewed the chart and current medication. The dialysis bath was adjusted.     Volume management/HTN: target UF is 3 liter  Anemia (target 10-12 mg/dl): within target

## 2019-03-30 NOTE — PT/OT/SLP EVAL
"Physical Therapy Evaluation and Discharge Note    Patient Name:  Sisi Medel   MRN:  [id number]    Recommendations:     Discharge Recommendations:  home health PT   Discharge Equipment Recommendations:     Barriers to discharge: None    Assessment:     Sisi Medel is a 58 y.o. female admitted with a medical diagnosis of Orthostatic hypotension.  At this time patient does not require further acute PT services. Pt performing all bed mobility Ind. And OOB activity Mod I using RW while on 3.5L O2 with no c/o SOB or fatigue until conclusion of session. Pt does not require skilled therapy services at this time and was educated on consulting nursing staff to assist with O2 tank for amb during this stay. Pt provided with RW to use while OOB to increase stability and would benefit from HHPT services for further home evaluation.     Recent Surgery: * No surgery found *      Plan:     During this hospitalization, patient does not require further acute PT services.  Please re-consult if situation changes.      Subjective     Chief Complaint: none noted   Patient/Family Comments/goals: pt very pleasant and willing to participate with therapy on this date.    Pain/Comfort:  · Pain Rating 1: 0/10    Patients cultural, spiritual, Methodist conflicts given the current situation: no    Living Environment:  Pt lives with thang in first floor apartment with 5-6 KAMERON and bilat. Handrails.      Prior to admission, patients level of function was Ind with ADL's and used QC mostly to amb but reports also using rollator "sometimes" and w/c occasionally.  Equipment used at home: walker, rolling, rollator, cane, quad, bedside commode, wheelchair, oxygen.  DME owned (not currently used): none.  Upon discharge, patient will have assistance from [facility].    Objective:     Communicated with RN prior to session.  Patient found supine with peripheral IV, oxygen upon PT entry to room.    General Precautions: Standard, fall "   Orthopedic Precautions:N/A   Braces: N/A     Exams:  · Cognitive Exam:  Patient is oriented to Person, Place, Time and Situation  · RLE ROM: WFL  · RLE Strength: WFL  · LLE ROM: WFL  · LLE Strength: WFL    Functional Mobility:  · Bed Mobility:     · Rolling Right: independence  · Scooting: independence  · Supine to Sit: independence  · Transfers:     · Sit to Stand:  modified independence with rolling walker  · Gait: pt amb initial 10ft without AD CGA demonstrating unsteady and unsafe gait patterns. pt amb remaining 240ft with RW Mod I with side to side swaying posture to clear LE's. pt demonstrated no LOB but has decreased merly while on 3.5L O2. pt voicing fatigue following return to bed.   · Balance: Sitting: Ind.    Standing: Mod I    AM-PAC 6 CLICK MOBILITY  Total Score:24       Therapeutic Activities and Exercises:   -EOB sitting and transfers from one side of bed to the other Mod I   -Pt educated on:   -PT roles, expectations, and POC    -Safety with mobility   -Benefits of OOB activities to increase strength and functional mobility    -Discharge recommendations   -Educated on plans to d/c PT, pt agreeable with plans to initiate HHPT    AM-PAC 6 CLICK MOBILITY  Total Score:24     Patient left sitting EOB per request to eat breakfast with all lines intact and call button in reach.    GOALS:   Multidisciplinary Problems     Physical Therapy Goals     Not on file          Multidisciplinary Problems (Resolved)        Problem: Physical Therapy Goal    Goal Priority Disciplines Outcome Goal Variances Interventions   Physical Therapy Goal   (Resolved)     PT, PT/OT Outcome(s) achieved                     History:     Past Medical History:   Diagnosis Date    Anemia in ESRD (end-stage renal disease) 3/7/2016    Anticoagulant long-term use     Cervical radiculopathy 7/20/2015    CHF (congestive heart failure)     Chronic combined systolic and diastolic heart failure 6/14/2013    EF 20% 2013, improved with  Medical therapy, negative PET     Chronic respiratory failure with hypoxia 2013    On home oxygen at 2-5 liters    Closed fracture of proximal end of right fibula 2016    Complications due to renal dialysis device, implant, and graft     DDD (degenerative disc disease), lumbar 2015    Dependence on renal dialysis     Diabetes mellitus type II, controlled 2017    ESRD on hemodialysis 2016    Essential hypertension     Gout     Lateral meniscal tear 2016    Lumbar stenosis 2015    Obesity, Class III, BMI 40-49.9 (morbid obesity)     Pacemaker     Paroxysmal atrial fibrillation 2014    Not on anticoagulation    Peripheral neuropathy 2013    Personal history of gastric ulcer 3/19/2013    Pneumonia of left lower lobe due to infectious organism 3/10/2019    Sarcoidosis, lung 2009    Diagnosed in  with ocular manifestation, on 4L home O2 and PO steroids    Secondary pulmonary hypertension 2015    Seizure disorder 3/19/2013    Shingles 2013    Thyroid disease        Past Surgical History:   Procedure Laterality Date    ABDOMINAL SURGERY      ABLATION N/A 2017    Performed by [facility]imir [facility]orno [facility] at [facility] CATH LAB    ANGIOPLASTY Left 1/10/2018    Performed by Torrey Villafana MD at [facility] OR 2ND FLR    ANGIOPLASTY-PERCUTANEOUS TRANSLUMINAL (PTA) Left 2017    Performed by Torrey [facility]afana MD at [facility] OR 2ND FLR    ANGIOPLASTY-PERCUTANEOUS TRANSLUMINAL (PTA) Left 10/12/2016    Performed by Torrey Villafana MD at [facility] OR 2ND FLR    CARDIAC PACEMAKER PLACEMENT       SECTION      x2    DECLOT GRAFT PERCUTANEOUS Left 2017    Performed by Torrey Villafana MD at [facility] OR 2ND FLR    DECLOT-GRAFT Left 2016    Performed by Harjit Starr MD at [facility] CATH LAB    DECLOT-GRAFT Left 2016    Performed by [facility]jit Starr MD at [facility] CATH LAB    ECHOCARDIOGRAM-TRANSESOPHAGEAL N/A 2013    Performed by  Delmy Surgeon at [facility] DELMY    fistulogram Left 1/10/2018    Performed by Torrey [facility]ana MD at [facility] OR 2ND FLR    FISTULOGRAM Left 9/13/2017    Performed by Torrey Villafana MD at [facility] CATH LAB    FISTULOGRAM Left 10/12/2016    Performed by Torrey Villafana MD at [facility] OR 2ND FLR    FISTULOGRAM Left 6/21/2016    Performed by Harjit Starr MD at [facility] CATH LAB    Fistulogram, LUE AVG, possible intervention Left 1/30/2019    Performed by Torrey Villafana MD at [facility] CATH LAB    INJECTION-STEROID-EPIDURAL-TRANSFORAMINAL Right 7/20/2015    Performed by Patria Gutierrez MD at [facility] MGT    INJECTION-STEROID-EPIDURAL-TRANSFORAMINAL Right 5/21/2015    Performed by [facility]rez MD at [facility] MGT    SHJDZHFIP-VHCTT-UWFJYXEDXWTSW / Left upper AV graft. Left 2/3/2016    Performed by Torrey Villafana MD at [facility] OR 2ND FLR    JBLUDZJGO-FEUDYSOJL-VLUWMNFWDIPRG N/A 1/11/2017    Performed by Bladimir Adorno MD at [facility] CATH LAB    OTHER SURGICAL HISTORY      loop recorder implant    PLACEMENT-STENT Left 2/7/2017    Performed by Torrey Villafana MD at [facility] OR 2ND FLR    PTA, AV FISTULA N/A 1/30/2019    Performed by Torrey Villafana MD at [facility] CATH LAB    stent to fistula      TRANSESOPHAGEAL ECHOCARDIOGRAM (JARAD) N/A 6/27/2016    Performed by Sourav Machuca MD at [facility] CATH LAB    ULTRASOUND, UPPER GI TRACT, ENDOSCOPIC N/A 1/9/2014    Performed by Stewart Burgess MD at [facility] ENDO (2ND FLR)    VASCULAR SURGERY      fistula to L upper arm        Time Tracking:     PT Received On: 03/30/19  PT Start Time: 0826     PT Stop Time: 0842  PT Total Time (min): 16 min     Billable Minutes: Evaluation 16      Landen Felix, PT  03/30/2019

## 2019-03-31 NOTE — PROGRESS NOTES
"[facility] Hospital Medicine  Progress Note    Patient Name: Sisi Medel  MRN: [id number]  Patient Class: IP- Inpatient   Admission Date: 3/28/2019  Length of Stay: 2 days  Attending Physician: BOB Thompson MD  Primary Care Provider: Carlos A Caputo MD    [facility] Medicine Team: [facility] HOSP MED S DWIGHT Thompson MD    Subjective:     Principal Problem:Orthostatic hypotension    HPI: Ms. Patricio is a pleasant 57 yo lady w/ pulmonary sarcoidosis on 5 L home oxygen, Class 3 HFrEF (3/2019 EF 35%), perm Afib/flutter s/p 6/2017 AVN ablation & PPM, ESRD (TTS), Type 2 DM (3/2019 A1c 6.2%) and recently diagnosed renal insufficiency secondary to chronic steroid therapy (now on pred 20 qd) who presents today c/o orthostatic hypotension.  She was recently admitted from 3/24 - 3/26 where she was having similar complaints despite being on midodrine 15 mg po TID and found to have adrenal insufficiency.  During that admission her prednisone was increased from 10 to 20 mg po qd with improvement in her symptoms.  The day after discharge she reports recurrent orthostatic hypotension and dyspnea on exertion with her activity now limited to less than a half block.  Today her home health nurse and home PT found her SBP to be in the 70s whenever she was standing or active.  On evaluation she reports that her light headedness is not present when she is laying down.  In the ED she was given vanc & zosyn for possible sepsis and ICU consulted for lactic acidosis.  She was given 500 cc LR but her lactic acid still increased to 5.5 from 4.8. She will be admitted to MICU for further monitoring.    Interval History: Ms. Medel states that she is just profoundly fatigued today.  She is still getting dizzy on standing.  She wore her abdominal binder yesterday with some improvement in HD she states.  She also tells me, "I have too much fluid still on me.  They need to do another dialysis session before Tuesday.  " "I still have way to much fluid on my legs."    Review of Systems   Constitutional: Positive for activity change, diaphoresis and fatigue. Negative for chills and fever.   HENT: Negative for congestion.    Respiratory: Negative for cough and shortness of breath.    Cardiovascular: Positive for leg swelling. Negative for chest pain.   Gastrointestinal: Negative for abdominal pain, constipation, diarrhea, nausea and vomiting.   Neurological: Positive for dizziness and light-headedness. Negative for weakness.   Hematological: Bruises/bleeds easily.   Psychiatric/Behavioral: Positive for sleep disturbance. Negative for confusion. The patient is not nervous/anxious.      Objective:     Vital Signs (Most Recent):  Temp: 97.4 °F (36.3 °C) (03/31/19 0745)  Pulse: 76 (03/31/19 0748)  Resp: 18 (03/31/19 0745)  BP: 101/79 (03/31/19 0748)  SpO2: 98 % (03/31/19 0748) Vital Signs (24h Range):  Temp:  [97 °F (36.1 °C)-98.7 °F (37.1 °C)] 97.4 °F (36.3 °C)  Pulse:  [50-78] 76  Resp:  [18] 18  SpO2:  [92 %-100 %] 98 %  BP: ()/(60-79) 101/79     Weight: 118 kg (260 lb 3.2 oz)  Body mass index is 39.56 kg/m².    Intake/Output Summary (Last 24 hours) at 3/31/2019 1115  Last data filed at 3/31/2019 0537  Gross per 24 hour   Intake 1340 ml   Output 3700 ml   Net -2360 ml      Physical Exam   Constitutional: She is oriented to person, place, and time. She appears well-developed and well-nourished.  Non-toxic appearance. She does not have a sickly appearance. She does not appear ill. No distress.   Morbid obesity.  Appears constitutionally more fatigued today.   HENT:   Head: Normocephalic and atraumatic.   Mouth/Throat: No oropharyngeal exudate.   Eyes: Pupils are equal, round, and reactive to light. Conjunctivae and EOM are normal. Right eye exhibits no discharge. Left eye exhibits no discharge. No scleral icterus.   Neck: Normal range of motion. Neck supple. No JVD present. No tracheal deviation present. No thyromegaly present. "   Cardiovascular: Normal rate, regular rhythm and intact distal pulses. Exam reveals no gallop and no friction rub.   Murmur heard.   Systolic murmur is present with a grade of 2/6.  AVG to Left brachial area      Pulmonary/Chest: Effort normal and breath sounds normal. No accessory muscle usage or stridor. No tachypnea and no bradypnea. No respiratory distress. She has no decreased breath sounds. She has no wheezes. She has no rhonchi. She has no rales. She exhibits no tenderness.   Abdominal: Soft. Bowel sounds are normal. She exhibits no distension and no mass. There is no tenderness. There is no rebound and no guarding.   Genitourinary:   Genitourinary Comments: No ace in place   Musculoskeletal: Normal range of motion. She exhibits no edema or tenderness.   Lymphadenopathy:     She has no cervical adenopathy.   Trace to 1+ edema to legs   Neurological: She is alert and oriented to person, place, and time. She displays normal reflexes. No cranial nerve deficit. She exhibits normal muscle tone. Coordination normal. GCS eye subscore is 4. GCS verbal subscore is 5. GCS motor subscore is 6.   Skin: Skin is warm and dry. No rash noted. She is not diaphoretic. No erythema. No pallor.   Some superficial ulcers to right lower leg   Psychiatric: Her speech is normal. Judgment and thought content normal. She is slowed. Cognition and memory are normal. She exhibits a depressed mood.   Nursing note and vitals reviewed.      Significant Labs:   Recent Results (from the past 24 hour(s))   POCT glucose    Collection Time: 03/30/19  1:00 PM   Result Value Ref Range    POCT Glucose 142 (H) 70 - 110 mg/dL   POCT glucose    Collection Time: 03/30/19  9:53 PM   Result Value Ref Range    POCT Glucose 173 (H) 70 - 110 mg/dL   Comprehensive metabolic panel    Collection Time: 03/31/19  5:25 AM   Result Value Ref Range    Sodium 136 136 - 145 mmol/L    Potassium 4.9 3.5 - 5.1 mmol/L    Chloride 98 95 - 110 mmol/L    CO2 25 23 - 29  mmol/L    Glucose 81 70 - 110 mg/dL    BUN, Bld 34 (H) 6 - 20 mg/dL    Creatinine 5.7 (H) 0.5 - 1.4 mg/dL    Calcium 8.3 (L) 8.7 - 10.5 mg/dL    Total Protein 7.3 6.0 - 8.4 g/dL    Albumin 3.1 (L) 3.5 - 5.2 g/dL    Total Bilirubin 1.1 (H) 0.1 - 1.0 mg/dL    Alkaline Phosphatase 93 55 - 135 U/L    AST 29 10 - 40 U/L    ALT 18 10 - 44 U/L    Anion Gap 13 8 - 16 mmol/L    eGFR if African American 8.8 (A) >60 mL/min/1.73 m^2    eGFR if non  7.6 (A) >60 mL/min/1.73 m^2   Magnesium    Collection Time: 03/31/19  5:25 AM   Result Value Ref Range    Magnesium 2.2 1.6 - 2.6 mg/dL   Phosphorus    Collection Time: 03/31/19  5:25 AM   Result Value Ref Range    Phosphorus 5.6 (H) 2.7 - 4.5 mg/dL   CBC auto differential    Collection Time: 03/31/19  5:25 AM   Result Value Ref Range    WBC 10.90 3.90 - 12.70 K/uL    RBC 4.44 4.00 - 5.40 M/uL    Hemoglobin 12.1 12.0 - 16.0 g/dL    Hematocrit 42.0 37.0 - 48.5 %    MCV 95 82 - 98 fL    MCH 27.3 27.0 - 31.0 pg    MCHC 28.8 (L) 32.0 - 36.0 g/dL    RDW 18.9 (H) 11.5 - 14.5 %    Platelets 211 150 - 350 K/uL    MPV 12.9 9.2 - 12.9 fL    Immature Granulocytes 0.6 (H) 0.0 - 0.5 %    Gran # (ANC) 7.7 1.8 - 7.7 K/uL    Immature Grans (Abs) 0.07 (H) 0.00 - 0.04 K/uL    Lymph # 1.3 1.0 - 4.8 K/uL    Mono # 1.7 (H) 0.3 - 1.0 K/uL    Eos # 0.1 0.0 - 0.5 K/uL    Baso # 0.03 0.00 - 0.20 K/uL    nRBC 0 0 /100 WBC    Gran% 70.2 38.0 - 73.0 %    Lymph% 12.3 (L) 18.0 - 48.0 %    Mono% 16.0 (H) 4.0 - 15.0 %    Eosinophil% 0.6 0.0 - 8.0 %    Basophil% 0.3 0.0 - 1.9 %    Differential Method Automated    Protime-INR    Collection Time: 03/31/19  5:25 AM   Result Value Ref Range    Prothrombin Time 17.6 (H) 9.0 - 12.5 sec    INR 1.8 (H) 0.8 - 1.2   POCT glucose    Collection Time: 03/31/19  7:51 AM   Result Value Ref Range    POCT Glucose 89 70 - 110 mg/dL       Significant Imaging:        XR CHEST AP PORTABLE    CLINICAL HISTORY:  hypotension;    TECHNIQUE:  Single frontal view of the chest  was performed.    COMPARISON:  Chest radiograph 03/24/2019    FINDINGS:  No detrimental change.  Monitoring leads overlie the chest.  Resolution is limited by body habitus with underpenetration.    Right chest dual lead cardiac device is unchanged.  Cardiomediastinal silhouette remains midline and stable without convincing evidence of failure, noting overall improved aeration of both lungs from 03/24/2019 exam.  No large pleural effusion or pneumothorax.  No definite new focal opacity.  No acute osseous process seen.  PA and lateral views can be obtained.      Impression       Cardiac device without detrimental change or radiographic acute intrathoracic process seen on this limited single view.      Electronically signed by: Jeancarlos Kohler MD  Date: 03/28/2019     28-MAR-2019 15:29:37 EKG    Atrial fibrillation  Vent. rate 56 BPM  AL interval 186 ms  QRS duration 180 ms  QT/QTc 508/490 ms  Ventricular-paced rhythm  Premature ventricular complexes  Abnormal ECG  When compared with ECG of 24-MAR-2019 16:03,  No significant change was found  Confirmed by OJEL RUSSELL MD ([id number]) on 3/29/2019 7:16:21 PM    Assessment/Plan:      Recurrent, severe orthostatic hypotension  -She was taking her midodrine 15mg PO QD rather than TID as needed   -Currently on midodrine tablet 15 mg, 15 mg, Oral, TID  -Plan for GUICHO hose and abdominal binder  -fludrocortisone tablet 100 mcg, 100 mcg, Oral, BID  -Cardiology consult noted and appreciated: thank you   -Recommend midodrine 10 mg as follows:   -On dialysis days:    -At 4 am, 12 noon (as she is finishing up dialysis) and 4 pm   -On non dialysis days:     At 8 am, 12 noon and 4 pm   -If this regimen works, then Florinef can either be decreased or withdrawn.   -She should fup in the Cardiology or Electrophysiology clinic   -Of note patient is V-paced 70s at baseline.    -Do not recommend increasing back up rate as questioned by primary team   -It would not benefit patient from orthostatic  hypotension standpoint    Chronic combined Systolic / Diastolic Heart Failure:  -Most recent echo on (3/11) reveals an EF of 35% with diastolic dysfunction  -Difficult case as severe orthostatic hypotension precluding proper therapy  -Continue symptomatic management with HD as pt is anuric.   -She is not on BB, ACE/ARB or spironolactone due to hypotension   -Continue to monitor on telemetry  -Monitor intake and output and obtain daily STANDING weights  -Fluid restriction to 1.5L per day and cardiac diet with salt restriction     Secondary adrenal insufficiency  -this has been a chronic issue for patient but had recently become much worse  -prednisone increased to 20mg daily last admit  -fludrocortisone tablet 100 mcg, 100 mcg, Oral, BID  -endocrine recommendations noted and appreciated  -Per Endocrine:  -Recommend reducing PDN down to 10 mg once daily at discharge, which is her maintenance dose.  -Okay to continue fludrocortisone 100 mcg daily, but this isn't necessary from an adrenal standpoint.  -Patient has been on high dose prednisone for ~ the past 5 years           -Due for repeat DXA, which can be done outpatient     End stage renal failure on dialysis  -Nephrology consulted for HD: due today  -Continue to monitor renal function with daily labs   -Avoid nephrotoxins  -Renally dose all medications and renal diet  -Monitor events that may lead to decreased renal perfusion (hypovolemia, hypotension, sepsis).   -sevelamer carbonate tablet 800 mg, 800 mg, Oral, TID WM     Atrial Fibrillation  Pacemaker in situ  -Pt is currently rate controlled with HR <110   -In afib but V-paced  -[id number] score >2  -warfarin (COUMADIN) tablet 5 mg, 5 mg, Oral, Daily  -not on any rate control agents  -continue to monitor on telemetry  -continue to monitor INR daily: 2.6-->1.8 today  -Maintain K > 4, Mg > 2     Chronic respiratory failure with hypoxia  -due to pulmonary sarcoidosis  -O2 at 2L to titrate down or up as  needed  -oxygen to keep sats >90%  -albuterol/ipratropium nebs q4H PRN wheezing and sob      Diabetes Mellitus 2, uncontrolled, neuropathy  -Last HbA1c: 6.2 on 3/10/19  -SSI with accuchecks qACHS  -BG goal: Preprandial blood glucose target <140 mg/dL, Random glucoses <180 mg/dL  -2000 ADA diet   -nortriptyline capsule 25 mg, 25 mg, Oral, QHS  -pregabalin capsule 100 mg, 100 mg, Oral, TID  -gabapentin capsule 600 mg, 600 mg, Oral, Daily      Hyperlipidemia  -chronic and stable  -continue atorvastatin tablet 80 mg, 80 mg, Oral, QHS     Pulmonary hypertension  -follows with pulmonology here  -continue home O2     Morbid obesity with BMI of 40.0-44.9, adult  -counseled on diet and exercise     Seizure disorder  -chronic and stable  -continue levETIRAcetam tablet 500 mg, 500 mg, Oral, BID  -maintain seizure precautions     Pulmonary sarcoidosis  -continue steroids              -predniSONE tablet 20 mg, 20 mg, Oral, Daily  -continue home oxygen     GERD  -continue pantoprazole EC tablet 40 mg, 40 mg, Oral, Daily     Recurrent graft stenosis  -aspirin EC tablet 81 mg, 81 mg, Oral, Daily  -clopidogrel tablet 75 mg, 75 mg, Oral, Daily    Anemia in ESRD  -Epo as per Nephrology with HD  -Ordered B12, folate, Fe studies as has been a while     Leukocytosis  -Resolved  -Suspect de-margination from stress of acute hypotension  -No evidence to support infection    VTE Risk Mitigation (From admission, onward)        Ordered     warfarin (COUMADIN) tablet 5 mg  Daily      03/29/19 0338     IP VTE HIGH RISK PATIENT  Once      03/29/19 0338             DWIGHT Thompson MD  Department of Hospital Medicine   [facility]

## 2019-03-31 NOTE — NURSING
Orthostatics done for cardiology while physician In room. Patient tolerated well. Assessment on going. Educated to not get up without sba due to light headed at times and to use abdominal binder as instructed. Verbalizes understanding.

## 2019-03-31 NOTE — PLAN OF CARE
Problem: Fall Injury Risk  Goal: Absence of Fall and Fall-Related Injury    Intervention: Identify and Manage Contributors to Fall Injury Risk     03/30/19 1951 03/31/19 0144   Manage Acute Allergic Reaction   Medication Review/Management medications reviewed;high risk medications identified  --    Identify and Manage Contributors to Fall Injury Risk   Self-Care Promotion  --  independence encouraged;BADL personal objects within reach     Intervention: Promote Injury-Free Environment     03/30/19 1951 03/31/19 0000   Optimize Balance and Safe Activity   Safety Promotion/Fall Prevention  --  assistive device/personal item within reach;commode/urinal/bedpan at bedside;diversional activities provided;Fall Risk reviewed with patient/family;Fall Risk signage in place;high risk medications identified;lighting adjusted;medications reviewed;nonskid shoes/socks when out of bed;side rails raised x 2;instructed to call staff for mobility   Optimize [place] and Functional Mobility   Environmental Safety Modification clutter free environment maintained;assistive device/personal items within reach;lighting adjusted  --          Problem: Adult Inpatient Plan of Care  Goal: Plan of Care Review  Outcome: Ongoing (interventions implemented as appropriate)     03/31/19 0144   Plan of Care Review   Plan of Care Reviewed With patient     Goal: Absence of Hospital-Acquired Illness or Injury    Intervention: Identify and Manage Fall Risk     03/31/19 0000   Optimize Balance and Safe Activity   Safety Promotion/Fall Prevention assistive device/personal item within reach;commode/urinal/bedpan at bedside;diversional activities provided;Fall Risk reviewed with patient/family;Fall Risk signage in place;high risk medications identified;lighting adjusted;medications reviewed;nonskid shoes/socks when out of bed;side rails raised x 2;instructed to call staff for mobility     Intervention: Prevent VTE (venous thromboembolism)     03/30/19 1951    Prevent or Manage Embolism   VTE Prevention/Management ambulation promoted;bleeding risk assessed         Problem: Diabetes Comorbidity  Goal: Blood Glucose Level Within Desired Range    Intervention: Maintain Glycemic Control     03/31/19 0144   Monitor and Manage Ketoacidosis   Glycemic Management blood glucose monitoring

## 2019-03-31 NOTE — SUBJECTIVE & OBJECTIVE
Past Medical History:   Diagnosis Date    Anemia in ESRD (end-stage renal disease) 3/7/2016    Anticoagulant long-term use     Cervical radiculopathy 2015    CHF (congestive heart failure)     Chronic combined systolic and diastolic heart failure 2013    EF 20% , improved with Medical therapy, negative PET     Chronic respiratory failure with hypoxia 2013    On home oxygen at 2-5 liters    Closed fracture of proximal end of right fibula 2016    Complications due to renal dialysis device, implant, and graft     DDD (degenerative disc disease), lumbar 2015    Dependence on renal dialysis     Diabetes mellitus type II, controlled 2017    ESRD on hemodialysis 2016    Essential hypertension     Gout     Lateral meniscal tear 2016    Lumbar stenosis 2015    Obesity, Class III, BMI 40-49.9 (morbid obesity)     Pacemaker     Paroxysmal atrial fibrillation 2014    Not on anticoagulation    Peripheral neuropathy 2013    Personal history of gastric ulcer 3/19/2013    Pneumonia of left lower lobe due to infectious organism 3/10/2019    Sarcoidosis, lung 2009    Diagnosed in  with ocular manifestation, on 4L home O2 and PO steroids    Secondary pulmonary hypertension 2015    Seizure disorder 3/19/2013    Shingles 2013    Thyroid disease        Past Surgical History:   Procedure Laterality Date    ABDOMINAL SURGERY      ABLATION N/A 2017    Performed by [facility]imir [facility]orno [facility] at [facility] CATH LAB    ANGIOPLASTY Left 1/10/2018    Performed by Torrey Villafana MD at [facility] OR 2ND FLR    ANGIOPLASTY-PERCUTANEOUS TRANSLUMINAL (PTA) Left 2017    Performed by Tor[facility]afana MD at [facility] OR 2ND FLR    ANGIOPLASTY-PERCUTANEOUS TRANSLUMINAL (PTA) Left 10/12/2016    Performed by [facility]af[facility] MD at [facility] OR 2ND FLR    CARDIAC PACEMAKER PLACEMENT       SECTION      x2    DECLOT GRAFT PERCUTANEOUS Left  2/7/2017    [facility] at [facility] OR 2ND FLR    DECLOT-GRAFT Left 6/21/2016    Performed by Harjit Starr MD at [facility] CATH LAB    DECLOT-GRAFT Left 6/20/2016    Performed by [facility] Starr [facility] at [facility] CATH LAB    ECHOCARDIOGRAM-TRANSESOPHAGEAL N/A 6/27/2013    Performed by Delmy Surgeon at [facility] DELMY    fistulogram Left 1/10/2018    Performed by Tor[facility] MD at [facility] OR 2ND FLR    FISTULOGRAM Left 9/13/2017    Performed by Tor[facility] Villafana MD at [facility] CATH LAB    FISTULOGRAM Left 10/12/2016    Performed by Torrey Villafana MD at [facility] OR 2ND FLR    FISTULOGRAM Left 6/21/2016    Performed by [facility]jit Starr [facility] at [facility] CATH LAB    Fistulogram, LUE AVG, possible intervention Left 1/30/2019    Performed by [facility] at [facility] CATH LAB    INJECTION-STEROID-EPIDURAL-TRANSFORAMINAL Right 7/20/2015    [facility] PAIN MGT    INJECTION-STEROID-EPIDURAL-TRANSFORAMINAL Right 5/21/2015    [facility]    WYOXNIFWY-EGXXO-APEYMKHCIYTEY / Left upper AV graft. Left 2/3/2016    Performed by [facility] at [facility] OR 2ND FLR    ZLGPOEHCY-WCAURJFZG-JFTBEWQWHOSAU N/A 1/11/2017    Performed by Bladimir Adorno MD at [facility] CATH LAB    OTHER SURGICAL HISTORY      loop recorder implant    PLACEMENT-STENT Left 2/7/2017    Performed by [facility] at [facility] OR 2ND FLR    PTA, AV FISTULA N/A 1/30/2019    Performed by [facility] at [facility] CATH LAB    stent to fistula      TRANSESOPHAGEAL ECHOCARDIOGRAM (JARAD) N/A 6/27/2016    Performed by Sourav Machuca MD at [facility] CATH LAB    ULTRASOUND, UPPER GI TRACT, ENDOSCOPIC N/A 1/9/2014    Performed by Stewart Burgess MD at [facility] ENDO (2ND FLR)    VASCULAR SURGERY      fistula to L upper arm        Review of patient's allergies indicates:   Allergen Reactions    Bactrim [sulfamethoxazole-trimethoprim] Other (See Comments)     Causes renal  failure    Nsaids (non-steroidal anti-inflammatory drug) Other (See Comments)     Renal failure       No current facility-administered medications on file prior to encounter.      Current Outpatient Medications on File Prior to Encounter   Medication Sig    aspirin (ECOTRIN) 81 MG EC tablet Take 81 mg by mouth once daily.    atorvastatin (LIPITOR) 80 MG tablet TAKE 1 TABLET BY MOUTH EVERY EVENING    cinacalcet (SENSIPAR) 30 MG Tab Take 30 mg by mouth daily with breakfast.    clopidogrel (PLAVIX) 75 mg tablet TAKE 1 TABLET(75 MG) BY MOUTH EVERY DAY    fludrocortisone (FLORINEF) 0.1 mg Tab Take 100 mcg by mouth 2 (two) times daily.    gabapentin (NEURONTIN) 600 MG tablet Take 600 mg by mouth 2 (two) times daily.     levETIRAcetam (KEPPRA) 500 MG Tab TAKE 1 TABLET BY MOUTH TWICE DAILY    midodrine (PROAMATINE) 5 MG Tab Take 5 mg by mouth 3 (three) times daily with meals.     nortriptyline (PAMELOR) 25 MG capsule TAKE 1 CAPSULE BY MOUTH EVERY EVENING    omeprazole (PRILOSEC) 20 MG capsule TAKE 1 CAPSULE BY MOUTH EVERY DAY    patiromer calcium sorbitex (VELTASSA) 16.8 gram PwPk Take 33.6 g by mouth once daily.     prednisoLONE acetate (PRED FORTE) 1 % DrpS INSTILL ONE DROP INTO  LEFT EYE THREE TIMES A DAY    predniSONE (DELTASONE) 20 MG tablet Take 1 tablet (20 mg total) by mouth once daily.    pregabalin (LYRICA) 100 MG capsule Take 1 capsule (100 mg total) by mouth 3 (three) times daily.    DENISE-LINDA 0.8 mg Tab TAKE 1 TABLET BY MOUTH ONCE DAILY    RENVELA 800 mg Tab TAKE 1 TABLET BY MOUTH THREE TIMES DAILY WITH MEALS    tiZANidine (ZANAFLEX) 4 MG tablet TAKE 1 TABLET BY MOUTH EVERY DAY AS NEEDED FOR MUSCLE SPASMS (Patient taking differently: TAKE 1 TABLET BY MOUTH EVERY NIGHT  AS NEEDED FOR MUSCLE SPASMS)    TRADJENTA 5 mg Tab tablet TAKE 1 TABLET(5 MG) BY MOUTH EVERY DAY    warfarin (COUMADIN) 5 MG tablet TAKE 1 TABLET BY MOUTH EVERY DAY EXCEPT TH 1AND 1/2 TABLETS ON MONDAY AND FRIDAY OR AS  DIRECTED (Patient taking differently: TAKE 1 TABLET BY MOUTH EVERY DAY OR AS DIRECTED)    blood sugar diagnostic Strp 1 each by Misc.(Non-Drug; Combo Route) route once daily. (Patient taking differently: 1 each by Misc.(Non-Drug; Combo Route) route 2 (two) times daily. )    clopidogrel (PLAVIX) 75 mg tablet TAKE 1 TABLET(75 MG) BY MOUTH EVERY DAY    lancets Misc 1 each by Misc.(Non-Drug; Combo Route) route once daily. (Patient taking differently: 1 each by Misc.(Non-Drug; Combo Route) route 2 (two) times daily. )    nortriptyline (PAMELOR) 25 MG capsule Take 1 capsule by mouth nightly     Family History     Problem Relation (Age of Onset)    Coronary artery disease Father    Diabetes Mother, Father, Maternal Grandmother    Hypertension Mother, Father    Kidney failure Mother    Lupus Sister        Tobacco Use    Smoking status: Former Smoker     Packs/day: 0.50     Years: 10.00     Pack years: 5.00     Types: Cigarettes     Last attempt to quit: 1994     Years since quittin.9    Smokeless tobacco: Never Used   Substance and Sexual Activity    Alcohol use: No     Alcohol/week: 0.0 oz     Comment: rarely    Drug use: No    Sexual activity: Not Currently     ROS   Constitutional: Positive for fatigue. Negative for fever, chills, unexpected weight change.   Eyes: Negative for visual disturbance.   Respiratory: Positive for shortness of breath.    Cardiovascular: Negative for chest pain.   Gastrointestinal: Negative for abdominal pain.   Genitourinary: Negative for urgency.   Musculoskeletal: Negative for arthralgias.   Skin: Negative for wound.   Neurological: Positive for weakness. Negative for headaches.   Hematological: Does not bruise/bleed easily.   Psychiatric/Behavioral: Negative for sleep disturbance.       Objective:     Vital Signs (Most Recent):  Temp: 97.6 °F (36.4 °C) (19)  Pulse: 75 (19 0300)  Resp: 18 (19)  BP: 116/75 (19)  SpO2: 100 % (19  8346) Vital Signs (24h Range):  Temp:  [97 °F (36.1 °C)-98.7 °F (37.1 °C)] 97.6 °F (36.4 °C)  Pulse:  [50-76] 75  Resp:  [18-19] 18  SpO2:  [92 %-100 %] 100 %  BP: (100-123)/(60-76) 116/75     Weight: 118 kg (260 lb 3.2 oz)  Body mass index is 39.56 kg/m².    SpO2: 100 %  O2 Device (Oxygen Therapy): nasal cannula      Intake/Output Summary (Last 24 hours) at 3/31/2019 0417  Last data filed at 3/30/2019 1835  Gross per 24 hour   Intake 1930 ml   Output 3700 ml   Net -1770 ml       Lines/Drains/Airways     Drain                 Hemodialysis AV Graft Left upper arm -- days          Peripheral Intravenous Line                 Peripheral IV - Single Lumen 03/28/19 Posterior;Right Hand 3 days         Peripheral IV - Single Lumen 03/28/19 1825 Right Antecubital 2 days         Peripheral IV - Single Lumen 03/29/19 1035 Right Antecubital 1 day                Physical Exam  Constitutional: She is oriented to person, place, and time. No distress.   HENT:   Head: Normocephalic and atraumatic.   Cardiovascular: Regular rhythm and intact distal pulses. Exam reveals no gallop and no friction rub.   No murmur heard.  Pulmonary/Chest: Effort normal. No respiratory distress. She has no wheezes. She has no rales.   Abdominal: Soft. Bowel sounds are normal. She exhibits no distension. There is no tenderness.   Musculoskeletal: Normal range of motion. She exhibits edema. She exhibits no deformity.   LUE AVF w/ palpable thrill.   Skin: Skin is warm and dry. She is not diaphoretic.   Neurological: She is alert and oriented to person, place, and time.       Significant Labs: All pertinent lab results from the last 24 hours have been reviewed.    Significant Imaging: Reviewed in EPIC

## 2019-03-31 NOTE — CONSULTS
"[facility]  Cardiology  Consult Note    Patient Name: Sisi Medel  MRN: [id number]  Admission Date: 3/28/2019  Hospital Length of Stay: 2 days  Code Status: Full Code   Attending Provider: BOB Thompson MD   Consulting Provider: Angelina Monroy MD  Primary Care Physician: Carlos A Caputo MD  Principal Problem:Orthostatic hypotension    Patient information was obtained from patient and ER records.     Inpatient consult to Cardiology  Consult performed by: Angelina Monroy MD  Consult ordered by: BOB Thompson MD        Subjective:     Chief Complaint:  "CHF, recurrent admissions for orthostatic hypotension"     HPI: Sisi Medel is a 57 yo F with PMHx significant for NICM (EF 35-40%), permanent AF s/p AVN RFA (6/2017), CRT-P, ESRD on HD, central adrenal insufficiency secondary to chronic corticosteroid use for treatment of pulmonary sarcoidosis, pulmonary hypertension, and chronic intermittent hypotension (on fludrocortisone and midodrine) who presented for weakness and orthostatic presyncope after HD. We are consulted to re-evaluate her recurrent admissions for orthostatic hypotension.     Per chart review patient was recently admitted from 3/24 - 3/26 where she was having similar complaints despite being on midodrine 15 mg po TID and found to have adrenal insufficiency. During that admission her prednisone was increased from 10 to 20 mg po qd with improvement in her symptoms.  The day after discharge she reported recurrent orthostatic hypotension and dyspnea on exertion with her activity now limited to less than a half block.   On day of admission her home health nurse and home PT found her SBP to be in the 70s whenever she was standing or active.        Past Medical History:   Diagnosis Date    Anemia in ESRD (end-stage renal disease) 3/7/2016    Anticoagulant long-term use     Cervical radiculopathy 7/20/2015    CHF (congestive heart failure)     Chronic " combined systolic and diastolic heart failure 2013    EF 20% , improved with Medical therapy, negative PET     Chronic respiratory failure with hypoxia 2013    On home oxygen at 2-5 liters    Closed fracture of proximal end of right fibula 2016    Complications due to renal dialysis device, implant, and graft     DDD (degenerative disc disease), lumbar 2015    Dependence on renal dialysis     Diabetes mellitus type II, controlled 2017    ESRD on hemodialysis 2016    Essential hypertension     Gout     Lateral meniscal tear 2016    Lumbar stenosis 2015    Obesity, Class III, BMI 40-49.9 (morbid obesity)     Pacemaker     Paroxysmal atrial fibrillation 2014    Not on anticoagulation    Peripheral neuropathy 2013    Personal history of gastric ulcer 3/19/2013    Pneumonia of left lower lobe due to infectious organism 3/10/2019    Sarcoidosis, lung 2009    Diagnosed in  with ocular manifestation, on 4L home O2 and PO steroids    Secondary pulmonary hypertension 2015    Seizure disorder 3/19/2013    Shingles 2013    Thyroid disease        Past Surgical History:   Procedure Laterality Date    ABDOMINAL SURGERY      ABLATION N/A 2017    Performed by Bladimir Adorno MD at [facility] CATH LAB    ANGIOPLASTY Left 1/10/2018    Performed by Torrey Villafana MD at [facility] OR 2ND FLR    ANGIOPLASTY-PERCUTANEOUS TRANSLUMINAL (PTA) Left 2017    Performed by Torrey Villafana MD at [facility] OR 2ND FLR    ANGIOPLASTY-PERCUTANEOUS TRANSLUMINAL (PTA) Left 10/12/2016    Performed by Torrey Villafana MD at [facility] OR 2ND FLR    CARDIAC PACEMAKER PLACEMENT       SECTION      x2    DECLOT GRAFT PERCUTANEOUS Left 2017    Performed by Torrey Villafana MD at [facility] OR 2ND FLR    DECLOT-GRAFT Left 2016    Performed by Harjit Starr MD at [facility] CATH LAB    DECLOT-GRAFT Left 2016    Performed by Harjit MANCUSO  MD Matty at [facility] CATH LAB    ECHOCARDIOGRAM-TRANSESOPHAGEAL N/A 6/27/2013    Performed by Delmy Surgeon at [facility] DELMY    fistulogram Left 1/10/2018    Performed by Torrey Villafana MD at [facility] OR 2ND FLR    FISTULOGRAM Left 9/13/2017    Performed by Torrey Villafana MD at [facility] CATH LAB    FISTULOGRAM Left 10/12/2016    Performed by Torrey Villafana MD at [facility] OR 2ND FLR    FISTULOGRAM Left 6/21/2016    Performed by Harjit Starr MD at [facility] CATH LAB    Fistulogram, LUE AVG, possible intervention Left 1/30/2019    Performed by Torrey Villafana MD at [facility] CATH LAB    INJECTION-STEROID-EPIDURAL-TRANSFORAMINAL Right 7/20/2015    Performed by Pat[facility]rez [facility] at [facility] PAIN MGT    INJECTION-STEROID-EPIDURAL-TRANSFORAMINAL Right 5/21/2015    Performed by [facility] at [facility] MGT    BVIGOOHBQ-YZYWJ-ZLEACYYEWDRED / Left upper AV graft. Left 2/3/2016    Performed by Torrey Villafana MD at [facility] OR 2ND FLR    MGMKSCOIW-YPQTFBPAE-IUUERLDOQTEZG N/A 1/11/2017    Performed by Bladimir Adorno MD at [facility] CATH LAB    OTHER SURGICAL HISTORY      loop recorder implant    PLACEMENT-STENT Left 2/7/2017    Performed by [facility]afana MD at [facility] OR 2ND FLR    PTA, AV FISTULA N/A 1/30/2019    Performed by [facility]afana MD at [facility] CATH LAB    stent to fistula      TRANSESOPHAGEAL ECHOCARDIOGRAM (JARAD) N/A 6/27/2016    Performed by Sourav Machuca MD at [facility] CATH LAB    ULTRASOUND, UPPER GI TRACT, ENDOSCOPIC N/A 1/9/2014    Performed by Stewart Burgess MD at [facility] ENDO (2ND FLR)    VASCULAR SURGERY      fistula to L upper arm        Review of patient's allergies indicates:   Allergen Reactions    Bactrim [sulfamethoxazole-trimethoprim] Other (See Comments)     Causes renal failure    Nsaids (non-steroidal anti-inflammatory drug) Other (See Comments)     Renal failure       No current facility-administered medications on file prior to encounter.      Current Outpatient Medications on  File Prior to Encounter   Medication Sig    aspirin (ECOTRIN) 81 MG EC tablet Take 81 mg by mouth once daily.    atorvastatin (LIPITOR) 80 MG tablet TAKE 1 TABLET BY MOUTH EVERY EVENING    cinacalcet (SENSIPAR) 30 MG Tab Take 30 mg by mouth daily with breakfast.    clopidogrel (PLAVIX) 75 mg tablet TAKE 1 TABLET(75 MG) BY MOUTH EVERY DAY    fludrocortisone (FLORINEF) 0.1 mg Tab Take 100 mcg by mouth 2 (two) times daily.    gabapentin (NEURONTIN) 600 MG tablet Take 600 mg by mouth 2 (two) times daily.     levETIRAcetam (KEPPRA) 500 MG Tab TAKE 1 TABLET BY MOUTH TWICE DAILY    midodrine (PROAMATINE) 5 MG Tab Take 5 mg by mouth 3 (three) times daily with meals.     nortriptyline (PAMELOR) 25 MG capsule TAKE 1 CAPSULE BY MOUTH EVERY EVENING    omeprazole (PRILOSEC) 20 MG capsule TAKE 1 CAPSULE BY MOUTH EVERY DAY    patiromer calcium sorbitex (VELTASSA) 16.8 gram PwPk Take 33.6 g by mouth once daily.     prednisoLONE acetate (PRED FORTE) 1 % DrpS INSTILL ONE DROP INTO  LEFT EYE THREE TIMES A DAY    predniSONE (DELTASONE) 20 MG tablet Take 1 tablet (20 mg total) by mouth once daily.    pregabalin (LYRICA) 100 MG capsule Take 1 capsule (100 mg total) by mouth 3 (three) times daily.    DENISE-LINDA 0.8 mg Tab TAKE 1 TABLET BY MOUTH ONCE DAILY    RENVELA 800 mg Tab TAKE 1 TABLET BY MOUTH THREE TIMES DAILY WITH MEALS    tiZANidine (ZANAFLEX) 4 MG tablet TAKE 1 TABLET BY MOUTH EVERY DAY AS NEEDED FOR MUSCLE SPASMS (Patient taking differently: TAKE 1 TABLET BY MOUTH EVERY NIGHT  AS NEEDED FOR MUSCLE SPASMS)    TRADJENTA 5 mg Tab tablet TAKE 1 TABLET(5 MG) BY MOUTH EVERY DAY    warfarin (COUMADIN) 5 MG tablet TAKE 1 TABLET BY MOUTH EVERY DAY EXCEPT TH 1AND 1/2 TABLETS ON MONDAY AND FRIDAY OR AS DIRECTED (Patient taking differently: TAKE 1 TABLET BY MOUTH EVERY DAY OR AS DIRECTED)    blood sugar diagnostic Strp 1 each by Misc.(Non-Drug; Combo Route) route once daily. (Patient taking differently: 1 each by  Misc.(Non-Drug; Combo Route) route 2 (two) times daily. )    clopidogrel (PLAVIX) 75 mg tablet TAKE 1 TABLET(75 MG) BY MOUTH EVERY DAY    lancets Misc 1 each by Misc.(Non-Drug; Combo Route) route once daily. (Patient taking differently: 1 each by Misc.(Non-Drug; Combo Route) route 2 (two) times daily. )    nortriptyline (PAMELOR) 25 MG capsule Take 1 capsule by mouth nightly     Family History     Problem Relation (Age of Onset)    Coronary artery disease Father    Diabetes Mother, Father, Maternal Grandmother    Hypertension Mother, Father    Kidney failure Mother    Lupus Sister        Tobacco Use    Smoking status: Former Smoker     Packs/day: 0.50     Years: 10.00     Pack years: 5.00     Types: Cigarettes     Last attempt to quit: 1994     Years since quittin.9    Smokeless tobacco: Never Used   Substance and Sexual Activity    Alcohol use: No     Alcohol/week: 0.0 oz     Comment: rarely    Drug use: No    Sexual activity: Not Currently     ROS   Constitutional: Positive for fatigue. Negative for fever, chills, unexpected weight change.   Eyes: Negative for visual disturbance.   Respiratory: Positive for shortness of breath.    Cardiovascular: Negative for chest pain.   Gastrointestinal: Negative for abdominal pain.   Genitourinary: Negative for urgency.   Musculoskeletal: Negative for arthralgias.   Skin: Negative for wound.   Neurological: Positive for weakness. Negative for headaches.   Hematological: Does not bruise/bleed easily.   Psychiatric/Behavioral: Negative for sleep disturbance.       Objective:     Vital Signs (Most Recent):  Temp: 97.6 °F (36.4 °C) (19)  Pulse: 75 (19 0300)  Resp: 18 (19)  BP: 116/75 (19)  SpO2: 100 % (19) Vital Signs (24h Range):  Temp:  [97 °F (36.1 °C)-98.7 °F (37.1 °C)] 97.6 °F (36.4 °C)  Pulse:  [50-76] 75  Resp:  [18-19] 18  SpO2:  [92 %-100 %] 100 %  BP: (100-123)/(60-76) 116/75     Weight: 118 kg (260 lb  3.2 oz)  Body mass index is 39.56 kg/m².    SpO2: 100 %  O2 Device (Oxygen Therapy): nasal cannula      Intake/Output Summary (Last 24 hours) at 3/31/2019 0417  Last data filed at 3/30/2019 1835  Gross per 24 hour   Intake 1930 ml   Output 3700 ml   Net -1770 ml       Lines/Drains/Airways     Drain                 Hemodialysis AV Graft Left upper arm -- days          Peripheral Intravenous Line                 Peripheral IV - Single Lumen 03/28/19 Posterior;Right Hand 3 days         Peripheral IV - Single Lumen 03/28/19 1825 Right Antecubital 2 days         Peripheral IV - Single Lumen 03/29/19 1035 Right Antecubital 1 day                Physical Exam  Constitutional: She is oriented to person, place, and time. No distress.   HENT:   Head: Normocephalic and atraumatic.   Cardiovascular: Regular rhythm and intact distal pulses. Exam reveals no gallop and no friction rub.   No murmur heard.  Pulmonary/Chest: Effort normal. No respiratory distress. She has no wheezes. She has no rales.   Abdominal: Soft. Bowel sounds are normal. She exhibits no distension. There is no tenderness.   Musculoskeletal: Normal range of motion. She exhibits edema. She exhibits no deformity.    LUE AVF w/ palpable thrill.   Skin: Skin is warm and dry. She is not diaphoretic.   Neurological: She is alert and oriented to person, place, and time.       Significant Labs: All pertinent lab results from the last 24 hours have been reviewed.    Significant Imaging: Reviewed in EPIC    Assessment and Plan:     * Orthostatic hypotension  57 yo F with PMHx significant for NICM (EF 35-40%), permanent AF s/p AVN RFA (6/2017), CRT-P, ESRD on HD, central adrenal insufficiency secondary to chronic corticosteroid use for treatment of pulmonary sarcoidosis, pulmonary hypertension who is admitted for recurrent symptomatic orthostatic hypotension /  autonomic dysfunction.    Recommendations:  -- Continue with midodrine TID regimen however recommend trialing at  a lower dose (given she was only taking midodrine 15 mg once daily) --> would start midodrine 10 mg Q4H TID, with first dose to be taken upon awaking and last dose to be taken 4 hours before bedtime.  (Eg dialysis days : 4 AM , 12 PM (immediately after dialysis) , 4 PM & non dialysis days : 8 AM , 12 PM , 4 PM).   -- May consider increasing midodrine dose as an outpatient vs consider switching to droxidopa which is non formulary in our hospital (and may not be covered by patient's insurance).  -- Continue fludrocortisone.  -- Of note patient is V-paced 70s at baseline. Do not recommend increasing back up rate as questioned by primary team; would not benefit patient from orthostatic hypotension standpoint.         Thank you for your consult. I will sign off. Please contact us if you have any additional questions.    Angelina Monroy MD  Cardiology   [facility]    Patient seen, examined, and discussed with Dr Machuca.

## 2019-03-31 NOTE — NURSING
Patient was asleep, respirations even and unlabored. Easily aroused to voice. Denied dizziness or light headed feelings. Stated she did not get much rest last pm and felt like resting today. Dr Thompson here aware of blood pressure. Patient stated she is resting today. On delirium precautions a night .

## 2019-03-31 NOTE — PLAN OF CARE
Problem: Adult Inpatient Plan of Care  Goal: Plan of Care Review  Outcome: Ongoing (interventions implemented as appropriate)  Patient alert and oriented . verbalizes needs. Gait slow and steady with walker. Abdominal binder placed. Educated to call for assistance up out of bed. Educated to wear binder for sitting and standing. No fall or occurrence. Blood sugars monitored. Did have dialysis today. 3 liters removed. Assessment on going. Dressing to lower extremity intact with some bloody drainage.

## 2019-04-01 PROBLEM — L97.909 ULCER OF LOWER LIMB: Status: ACTIVE | Noted: 2019-01-01

## 2019-04-01 NOTE — PLAN OF CARE
ZACH assigned to case today 4/1/2019. ZACH will assist team with DC needs. ZACH in communication with CM.    ZACH sent Home Health referral to N via [facility]. [facility] will assign the  agency.      04/01/19 1229   Post-Acute Status   Post-Acute Authorization Home Health/Hospice   Home Health/Hospice Status Referrals Sent     Sissy Ulloa LMSW  [facility]  [id number]     This is   a  42 Y/O White Male with Hx of Continuous Opiate , Alcohol, and Crack Dependency. Prior hx of 5 Detoxes in the past, last Detox at [facility] 2/27/2019 – 3/01/2019,pt left AMA, pt relapsed right after D/C.    DRUG	AGE OF ONSET	ROUTE	FREQ	AMOUNT	LAST USE	LENGTH OF CURRENT USE	  HEROIN	42 Y/O	IV	Daily	20-30 Bags	3/20/19 20 Bags	3 Weeks	  ALCOHOL	18 Y/O	PO	Daily	A Liter of Vodka	3/20/19  6 Shots	3 Weeks	  CRACK	25 Y/O	Smoking	Daily	$500	3/20/19 $100	3 Weeks	  							  							    I-Stop:  Was the prescriber informed by Intake clinician: N/A    11/28/2018	11/30/2018	suboxone 8 mg-2 mg sl film 	30	15	Anibal Fields MD	Medicaid	[id number]	  11/20/2018	11/20/2018	suboxone 8 mg-2 mg sl film 	20	10	Anibal Fields MD	Medicaid	[phone]	  11/12/2018	11/13/2018	suboxone 8 mg-2 mg sl film 	15	7	Anibal Fields MD			    Alcohol W/D Hx:  (+) Tremors   (+) Blackout      (-) Seizure      (-) DT’s       (-) Hallucination   Opiate W/D  HX:  (+)Myalgia,(+)N/V/D,(+) Diaphoresis,(+)   Anxiety,(+)Insomnia,(+)Piloerection,(+)Lacrimation         Hx of  Overdose :  No                                             Hx x of Withdrawal Seizures:  NO    MMTP :     NO        Psyhx:  Schizoaffective  D/O,and Bipolar D/O                Denies any S/H Ideation or A/V Hallucination  Screening history	Last tested	Result	History of treatment	  HIV	2/27/2019	NEG	N/A	  Hepatitis C	2/27/2019	NEG	N/A	  Quantiferon GOLD TB test	10/11/2018	NEG	N/A	    Immunization	Not Received	Unknown	Received	Date Received 	  Influenza		X			  Pneumococcus		X			  Tetanus			X	>10 years	  Others					  					    Patient  denied  any Chest Pain, SOB, Abdominal Pain, HA, Blurry Vision, Bleeding ,or Dysuria

## 2019-04-01 NOTE — CONSULTS
A Wound  Consult was received from RN for right lateral leg wound  The patient was admitted with orthostatic hypotension and has a past medical history of right lower lateral leg ulceration, pulmonary sarcoidosis, HFrEF, Afib/flutter, ESRD, DM2, renal insufficiency   Upon assessment, the right lateral lower leg has pink scar tissue from resolved blistered areas according to patient.  She has wounds open on the lateral aspect and pale white skin to the veto-wound. She attends outpatient wound care and has an appointment on Wednesday.    The plan of care was discussed with the patient, verbalized understanding.   The Unit Nurse was notified of the care provided and we discussed the treatment plan.  Dr. Thompson was notified of the above and approved recommendations for care.  Nursing to continue care, wound care to follow-up prn.     Consultant Recommendations:   1. RIGHT LATERAL LOWER LEG- ulcerations- medi-honey, border dressing daily  2. Follow-up with outpatient wound care as scheduled       04/01/19 1145        Wound 03/11/19 Ulceration lateral Leg   Date First Assessed: 03/11/19   Pre-existing: Yes  Primary Wound Type: Ulceration  Side: Right  Orientation: lateral  Location: Leg   Wound Image    Wound WDL ex   Dressing Appearance Dry;Intact;Clean   Drainage Amount Scant   Drainage Characteristics/Odor Serous;No odor   Appearance Red;Slough;Moist   Tissue loss description Full thickness   Red (%), Wound Tissue Color 50 %   Yellow (%), Wound Tissue Color 50 %   Periwound Area Intact;Moist;Pale white   Wound Edges Open   Wound Length (cm) 1 cm   Wound Width (cm) 1 cm   Wound Depth (cm) 0.3 cm   Wound Volume (cm^3) 0.3 cm^3   Wound Surface Area (cm^2) 1 cm^2   Care Cleansed with:;Sterile normal saline;Applied:;Skin Barrier  (cavilon barrier film)   Dressing Changed;Applied;Honey;Island/border   Dressing Change Due 04/02/19

## 2019-04-01 NOTE — PLAN OF CARE
Problem: Adult Inpatient Plan of Care  Goal: Plan of Care Review  Outcome: Ongoing (interventions implemented as appropriate)  Patient alert and oriented and verbalizes needs. Turns and positions self. No fall or occurrences noted. Did get seen per cardiology today and orthostatic vitals done at that time. Blood sugars monitored. No dialysis today , patient stated she wants dialysis tomorrow and is aware her days are Tuesday , thrusday and Saturday. Denied pain. Has bruising to bilateral arms and physician aware . She spoke with her physician today. Assessment on going. Ate well , anuric. No stool today.

## 2019-04-01 NOTE — NURSING
AAO x 4.  Vital signs stable.  Afebrile.  No complaints of chest pain or shortness of breath.  No complaints of pain or discomfort.  Patient stated she has a lot of fluid that needs to be taken off so she wants to have dialysis today.  Remained free from falls or incidents this shift.

## 2019-04-01 NOTE — PLAN OF CARE
PHN assigned [facility] for  services.      04/01/19 1540   Post-Acute Status   Post-Acute Authorization Home Health/Hospice   Home Health/Hospice Status Set-up Complete     Sissy Ulloa LMSW  [facility]  [id number]

## 2019-04-01 NOTE — PLAN OF CARE
[facility]    HOME HEALTH ORDERS  FACE TO FACE ENCOUNTER    Patient Name: Sisi Medel  YOB: 1960    PCP: Carlos A Caputo MD   PCP [facility] [address] [facility] [address]  PCP Phone Number: [phone]  PCP Fax: [phone]    Encounter Date: 04/01/2019    Admit to Home Health 4/2/19    Diagnoses:  Active Hospital Problems    Diagnosis  POA    *Orthostatic hypotension [I95.1]  Yes     Priority: 1 - High    Lactic acidemia [E87.2]  Yes     Priority: 2     Permanent atrial fibrillation [I48.2]  Yes     Priority: 3     Pulmonary hypertension [I27.20]  Yes     Priority: 4     Secondary adrenal insufficiency [E27.49]  Yes     Priority: 5     Pulmonary sarcoidosis [D86.0]  Yes     Priority: 6     Seizure disorder [G40.909]  Yes     Priority: 7     Severe obesity (BMI 35.0-35.9 with comorbidity) [E66.01, Z68.35]  Not Applicable     Priority: 8     Morbid obesity with BMI of 40.0-44.9, adult [E66.01, Z68.41]  Not Applicable     Priority: 11      Chronic    Hyperlipidemia [E78.5]  Yes     Priority: 12     AV fistula thrombosis, subsequent encounter [T82.868D]  Not Applicable    Diabetes mellitus type II, controlled [E11.9]  Yes    End stage renal failure on dialysis [N18.6, Z99.2]  Not Applicable    Biventricular cardiac pacemaker in situ [Z95.0]  Yes    CRLD (chronic restrictive lung disease) [J98.4]  Yes    Current chronic use of systemic steroids [Z79.52]  Not Applicable    Chronic gout due to renal impairment without tophus [M1A.30X0]  Yes    Chronic combined systolic and diastolic heart failure [I50.42]  Yes     EF 20% 2013, improved with Medical therapy, negative PET 2013        Resolved Hospital Problems   No resolved problems to display.       Future Appointments   Date Time Provider Department Center   4/3/2019  8:30 AM Claudia Ron DPM [facility] POD [facility]   4/3/2019  9:30 AM LAB, COUMADIN [facility] COUMAD [facility]   4/5/2019  7:40 AM  Dorita Haywood, NP [facility]OUND [facility]   4/10/2019 12:30 PM TYRESE Duggan [facility] IMPRICL [facility] PCW   4/10/2019  1:30 PM LAB, APPOINTMENT [place] [facility] VNP [facility]   4/10/2019  2:20 PM Timoteo Alfaro MD [facility] HEARTTX [facility]   4/24/2019 10:30 AM [facility] EMG PROCEDURAL LAB [facility] NEURO [facility]   5/10/2019 10:30 AM Jj Altamirano MD [facility] PULMSVC [facility]   5/24/2019  8:00 AM Emeka Lester MD [facility] NEURO [facility]   7/1/2019  9:00 AM Annette Hernandez NP [facility] ENDODIA [facility] [address]   7/2/2019  8:00 AM HOME MONITOR DEVICE CHECK, [facility] ARRHPRO [facility] [address]   7/5/2019  9:00 AM Carlos A Caputo MD [facility] IM [facility] [address] PCW   10/1/2019  8:00 AM HOME MONITOR DEVICE CHECK, [facility] ARRRO [facility] [address]     Follow-up Information     Carlos A Caputo MD In 1 month.    Specialty:  Internal Medicine  Contact information:  [address] [facility] [id number] [phone]             [facility] [address] - Cardiology In 2 months.    Specialty:  Cardiology  Contact information:  [address] [facility] [id number] [phone]  Additional information:  3rd floor           [facility] ENDOCRINOLOGY In 2 months.    Specialty:  Endocrinology  Contact information:  [address] [facility] [address]  [phone]           [facility] - Nephrology In 2 months.    Specialty:  Nephrology  Contact information:  [address] [facility] [id number] [phone]  Additional information:  [facility] [place] - 5th Floor                   I have seen and examined this patient face to face today. My clinical findings that support the need for the home health skilled services and home bound status are the following:  Weakness/numbness causing balance and gait disturbance due to Heart Failure, Coronary Heart Disease, Weakness/Debility and Severe orthostatic hypotension making it taxing to leave home.  Requiring assistive device to leave home due to unsteady gait caused by  Heart Failure,  Coronary Artery Disease, Weakness/Debility and Severe orthostatic hypotension.  Patient with medication mismanagement issues requiring home bound status as evidenced by  Unstable vital signs (blood pressure, heart rate), Poor understanding of medication regimen/dosage, Poor adherence to medication regimen/dosage and Uncontrolled hyperglycemic/hypoglycemic events.    Allergies:  Review of patient's allergies indicates:   Allergen Reactions    Bactrim [sulfamethoxazole-trimethoprim] Other (See Comments)     Causes renal failure    Nsaids (non-steroidal anti-inflammatory drug) Other (See Comments)     Renal failure       Diet: diabetic diet: 1800 calorie and renal diet    Activities: activity as tolerated    Nursing:   SN to complete comprehensive assessment including routine vital signs. Instruct on disease process and s/s of complications to report to MD. Review/verify medication list sent home with the patient at time of discharge  and instruct patient/caregiver as needed. Frequency may be adjusted depending on start of care date.    Notify MD if SBP > 160 or < 90; DBP > 90 or < 50; HR > 120 or < 50; Temp > 101; Other:         CONSULTS:    Physical Therapy to evaluate and treat. Evaluate for home safety and equipment needs; Establish/upgrade home exercise program. Perform / instruct on therapeutic exercises, gait training, transfer training, and Range of Motion.  Occupational Therapy to evaluate and treat. Evaluate home environment for safety and equipment needs. Perform/Instruct on transfers, ADL training, ROM, and therapeutic exercises.    MISCELLANEOUS CARE:  Diabetic Care:   SN to perform and educate Diabetic management with blood glucose monitoring:, Fingerstick blood sugar AC and HS and Report CBG < 60 or > 350 to physician.    WOUND CARE ORDERS  Keep right leg ulcers clean and dry      Medications: Review discharge medications with patient and family and provide education.      Current Discharge Medication  List      CONTINUE these medications which have CHANGED    Details   midodrine (PROAMATINE) 10 MG tablet Take 1 tablet (10 mg total) by mouth 3 (three) times daily.  Qty: 90 tablet, Refills: 11      predniSONE (DELTASONE) 20 MG tablet Take 0.5 tablets (10 mg total) by mouth once daily.  Qty: 30 tablet, Refills: 2         CONTINUE these medications which have NOT CHANGED    Details   aspirin (ECOTRIN) 81 MG EC tablet Take 81 mg by mouth once daily.      atorvastatin (LIPITOR) 80 MG tablet TAKE 1 TABLET BY MOUTH EVERY EVENING  Qty: 90 tablet, Refills: 1    Associated Diagnoses: Chronic combined systolic and diastolic heart failure      cinacalcet (SENSIPAR) 30 MG Tab Take 30 mg by mouth daily with breakfast.      clopidogrel (PLAVIX) 75 mg tablet TAKE 1 TABLET(75 MG) BY MOUTH EVERY DAY  Qty: 90 tablet, Refills: 0    Comments: **Patient requests 90 days supply**      fludrocortisone (FLORINEF) 0.1 mg Tab Take 100 mcg by mouth 2 (two) times daily.      gabapentin (NEURONTIN) 600 MG tablet Take 600 mg by mouth 2 (two) times daily.       levETIRAcetam (KEPPRA) 500 MG Tab TAKE 1 TABLET BY MOUTH TWICE DAILY  Qty: 180 tablet, Refills: 3    Associated Diagnoses: Seizure disorder      omeprazole (PRILOSEC) 20 MG capsule TAKE 1 CAPSULE BY MOUTH EVERY DAY  Qty: 90 capsule, Refills: 4    Comments: **Patient requests 90 days supply**  Associated Diagnoses: Gastroesophageal reflux disease with esophagitis      patiromer calcium sorbitex (VELTASSA) 16.8 gram PwPk Take 33.6 g by mouth once daily.       prednisoLONE acetate (PRED FORTE) 1 % DrpS INSTILL ONE DROP INTO  LEFT EYE THREE TIMES A DAY  Refills: 3      pregabalin (LYRICA) 100 MG capsule Take 1 capsule (100 mg total) by mouth 3 (three) times daily.  Qty: 270 capsule, Refills: 1    Associated Diagnoses: Polyneuropathy associated with underlying disease      DENISE-LINDA 0.8 mg Tab TAKE 1 TABLET BY MOUTH ONCE DAILY  Refills: 0      RENVELA 800 mg Tab TAKE 1 TABLET BY MOUTH THREE  TIMES DAILY WITH MEALS  Qty: 90 tablet, Refills: 0      tiZANidine (ZANAFLEX) 4 MG tablet TAKE 1 TABLET BY MOUTH EVERY DAY AS NEEDED FOR MUSCLE SPASMS  Qty: 90 tablet, Refills: 0    Associated Diagnoses: Muscle spasm      TRADJENTA 5 mg Tab tablet TAKE 1 TABLET(5 MG) BY MOUTH EVERY DAY  Qty: 90 tablet, Refills: 0    Associated Diagnoses: Controlled type 2 diabetes mellitus with complication, without long-term current use of insulin      warfarin (COUMADIN) 5 MG tablet TAKE 1 TABLET BY MOUTH EVERY DAY EXCEPT TH 1AND 1/2 TABLETS ON MONDAY AND FRIDAY OR AS DIRECTED  Qty: 120 tablet, Refills: 0    Comments: **Patient requests 90 days supply**  Associated Diagnoses: Long term current use of anticoagulant therapy; Paroxysmal atrial fibrillation      blood sugar diagnostic Strp 1 each by Misc.(Non-Drug; Combo Route) route once daily.  Qty: 100 each, Refills: 3    Comments: Please provide test strips covered by patient's insurance  Associated Diagnoses: Uncontrolled type 2 diabetes mellitus with hyperglycemia      lancets Misc 1 each by Misc.(Non-Drug; Combo Route) route once daily.  Qty: 100 each, Refills: 3    Comments: Please provide lancets covered by patient's insurance  Associated Diagnoses: Uncontrolled type 2 diabetes mellitus with hyperglycemia      nortriptyline (PAMELOR) 25 MG capsule Take 1 capsule by mouth nightly  Refills: 1             I certify that this patient is confined to her home and needs physical therapy and speech therapy.

## 2019-04-01 NOTE — PROGRESS NOTES
[facility] Hospital Medicine  Progress Note    Patient Name: Sisi Medel  MRN: [id number]  Patient Class: IP- Inpatient   Admission Date: 3/28/2019  Length of Stay: 3 days  Attending Physician: BOB Thompson MD  Primary Care Provider: Carlos A Caputo MD    [facility] Medicine Team: [facility] DWIGHT Thompson MD    Subjective:     Principal Problem:Orthostatic hypotension    HPI: Ms. Patricio is a pleasant 59 yo lady w/ pulmonary sarcoidosis on 5 L home oxygen, Class 3 HFrEF (3/2019 EF 35%), perm Afib/flutter s/p 6/2017 AVN ablation & PPM, ESRD (TTS), Type 2 DM (3/2019 A1c 6.2%) and recently diagnosed renal insufficiency secondary to chronic steroid therapy (now on pred 20 qd) who presents today c/o orthostatic hypotension.  She was recently admitted from 3/24 - 3/26 where she was having similar complaints despite being on midodrine 15 mg po TID and found to have adrenal insufficiency.  During that admission her prednisone was increased from 10 to 20 mg po qd with improvement in her symptoms.  The day after discharge she reports recurrent orthostatic hypotension and dyspnea on exertion with her activity now limited to less than a half block.  Today her home health nurse and home PT found her SBP to be in the 70s whenever she was standing or active.  On evaluation she reports that her light headedness is not present when she is laying down.  In the ED she was given vanc & zosyn for possible sepsis and ICU consulted for lactic acidosis.  She was given 500 cc LR but her lactic acid still increased to 5.5 from 4.8. She will be admitted to MICU for further monitoring.    Interval History: Ms. Medel feels that her dizziness is significantly better with the new Midodrine dose and abdominal binder.  She is a little aggravated that she cannot get HD today, rather having to go to her regularly scheduled HD outpatient tomorrow.  She has no chest pain or SOB.    Review of Systems    Constitutional: Positive for activity change. Negative for chills, diaphoresis, fatigue and fever.   HENT: Negative for congestion.    Respiratory: Negative for cough and shortness of breath.    Cardiovascular: Positive for leg swelling. Negative for chest pain.   Gastrointestinal: Negative for abdominal pain, constipation, diarrhea, nausea and vomiting.   Neurological: Positive for dizziness and light-headedness. Negative for weakness.   Hematological: Bruises/bleeds easily.   Psychiatric/Behavioral: Positive for sleep disturbance. Negative for confusion. The patient is not nervous/anxious.      Objective:     Vital Signs (Most Recent):  Temp: 98.1 °F (36.7 °C) (04/01/19 0801)  Pulse: 70 (04/01/19 0801)  Resp: 20 (04/01/19 0801)  BP: 117/85 (04/01/19 0801)  SpO2: 96 % (04/01/19 0801) Vital Signs (24h Range):  Temp:  [97.3 °F (36.3 °C)-98.1 °F (36.7 °C)] 98.1 °F (36.7 °C)  Pulse:  [68-71] 70  Resp:  [18-20] 20  SpO2:  [96 %-100 %] 96 %  BP: ()/(58-85) 117/85     Weight: 118 kg (260 lb 3.2 oz)  Body mass index is 39.56 kg/m².    Intake/Output Summary (Last 24 hours) at 4/1/2019 0915  Last data filed at 3/31/2019 1830  Gross per 24 hour   Intake 740 ml   Output --   Net 740 ml      Physical Exam   Constitutional: She is oriented to person, place, and time. She appears well-developed and well-nourished.  Non-toxic appearance. She does not have a sickly appearance. She does not appear ill. No distress.   Morbid obesity.  Appears constitutionally more fatigued today.   HENT:   Head: Normocephalic and atraumatic.   Mouth/Throat: No oropharyngeal exudate.   Eyes: Pupils are equal, round, and reactive to light. Conjunctivae and EOM are normal. Right eye exhibits no discharge. Left eye exhibits no discharge. No scleral icterus.   Neck: Normal range of motion. Neck supple. No JVD present. No tracheal deviation present. No thyromegaly present.   Cardiovascular: Normal rate, regular rhythm and intact distal pulses. Exam  reveals no gallop and no friction rub.   Murmur heard.   Systolic murmur is present with a grade of 2/6.  AVG to Left brachial area      Pulmonary/Chest: Effort normal and breath sounds normal. No accessory muscle usage or stridor. No tachypnea and no bradypnea. No respiratory distress. She has no decreased breath sounds. She has no wheezes. She has no rhonchi. She has no rales. She exhibits no tenderness.   Abdominal: Soft. Bowel sounds are normal. She exhibits no distension and no mass. There is no tenderness. There is no rebound and no guarding.   Genitourinary:   Genitourinary Comments: No cae in place   Musculoskeletal: Normal range of motion. She exhibits no edema or tenderness.   Lymphadenopathy:     She has no cervical adenopathy.   Trace to 1+ edema to legs   Neurological: She is alert and oriented to person, place, and time. She displays normal reflexes. No cranial nerve deficit. She exhibits normal muscle tone. Coordination normal. GCS eye subscore is 4. GCS verbal subscore is 5. GCS motor subscore is 6.   Skin: Skin is warm and dry. No rash noted. She is not diaphoretic. No erythema. No pallor.   Some superficial ulcers to right lower leg   Psychiatric: Her speech is normal. Judgment and thought content normal. She is slowed. Cognition and memory are normal. She exhibits a depressed mood.   Nursing note and vitals reviewed.      Significant Labs:   Recent Results (from the past 24 hour(s))   POCT glucose    Collection Time: 03/31/19 12:25 PM   Result Value Ref Range    POCT Glucose 160 (H) 70 - 110 mg/dL   POCT glucose    Collection Time: 03/31/19  4:53 PM   Result Value Ref Range    POCT Glucose 176 (H) 70 - 110 mg/dL   POCT glucose    Collection Time: 03/31/19 10:27 PM   Result Value Ref Range    POCT Glucose 131 (H) 70 - 110 mg/dL   Comprehensive metabolic panel    Collection Time: 04/01/19  3:46 AM   Result Value Ref Range    Sodium 135 (L) 136 - 145 mmol/L    Potassium 5.1 3.5 - 5.1 mmol/L     Chloride 98 95 - 110 mmol/L    CO2 22 (L) 23 - 29 mmol/L    Glucose 111 (H) 70 - 110 mg/dL    BUN, Bld 55 (H) 6 - 20 mg/dL    Creatinine 7.7 (H) 0.5 - 1.4 mg/dL    Calcium 7.8 (L) 8.7 - 10.5 mg/dL    Total Protein 6.4 6.0 - 8.4 g/dL    Albumin 2.9 (L) 3.5 - 5.2 g/dL    Total Bilirubin 0.8 0.1 - 1.0 mg/dL    Alkaline Phosphatase 82 55 - 135 U/L    AST 26 10 - 40 U/L    ALT 16 10 - 44 U/L    Anion Gap 15 8 - 16 mmol/L    eGFR if African American 6.1 (A) >60 mL/min/1.73 m^2    eGFR if non  5.3 (A) >60 mL/min/1.73 m^2   Magnesium    Collection Time: 04/01/19  3:46 AM   Result Value Ref Range    Magnesium 2.2 1.6 - 2.6 mg/dL   Phosphorus    Collection Time: 04/01/19  3:46 AM   Result Value Ref Range    Phosphorus 5.4 (H) 2.7 - 4.5 mg/dL   CBC auto differential    Collection Time: 04/01/19  3:46 AM   Result Value Ref Range    WBC 11.83 3.90 - 12.70 K/uL    RBC 3.94 (L) 4.00 - 5.40 M/uL    Hemoglobin 10.9 (L) 12.0 - 16.0 g/dL    Hematocrit 35.7 (L) 37.0 - 48.5 %    MCV 91 82 - 98 fL    MCH 27.7 27.0 - 31.0 pg    MCHC 30.5 (L) 32.0 - 36.0 g/dL    RDW 17.8 (H) 11.5 - 14.5 %    Platelets 171 150 - 350 K/uL    MPV 13.1 (H) 9.2 - 12.9 fL    Immature Granulocytes 0.8 (H) 0.0 - 0.5 %    Gran # (ANC) 8.3 (H) 1.8 - 7.7 K/uL    Immature Grans (Abs) 0.09 (H) 0.00 - 0.04 K/uL    Lymph # 1.4 1.0 - 4.8 K/uL    Mono # 1.9 (H) 0.3 - 1.0 K/uL    Eos # 0.1 0.0 - 0.5 K/uL    Baso # 0.02 0.00 - 0.20 K/uL    nRBC 0 0 /100 WBC    Gran% 70.3 38.0 - 73.0 %    Lymph% 12.2 (L) 18.0 - 48.0 %    Mono% 15.7 (H) 4.0 - 15.0 %    Eosinophil% 0.8 0.0 - 8.0 %    Basophil% 0.2 0.0 - 1.9 %    Differential Method Automated    Protime-INR    Collection Time: 04/01/19  3:46 AM   Result Value Ref Range    Prothrombin Time 18.0 (H) 9.0 - 12.5 sec    INR 1.8 (H) 0.8 - 1.2   POCT glucose    Collection Time: 04/01/19  7:59 AM   Result Value Ref Range    POCT Glucose 89 70 - 110 mg/dL       Significant Imaging:        XR CHEST AP PORTABLE    CLINICAL  HISTORY:  hypotension;    TECHNIQUE:  Single frontal view of the chest was performed.    COMPARISON:  Chest radiograph 03/24/2019    FINDINGS:  No detrimental change.  Monitoring leads overlie the chest.  Resolution is limited by body habitus with underpenetration.    Right chest dual lead cardiac device is unchanged.  Cardiomediastinal silhouette remains midline and stable without convincing evidence of failure, noting overall improved aeration of both lungs from 03/24/2019 exam.  No large pleural effusion or pneumothorax.  No definite new focal opacity.  No acute osseous process seen.  PA and lateral views can be obtained.      Impression       Cardiac device without detrimental change or radiographic acute intrathoracic process seen on this limited single view.      Electronically signed by: Jeancarlos Kohler MD  Date: 03/28/2019     28-MAR-2019 15:29:37 EKG    Atrial fibrillation  Vent. rate 56 BPM  NV interval 186 ms  QRS duration 180 ms  QT/QTc 508/490 ms  Ventricular-paced rhythm  Premature ventricular complexes  Abnormal ECG  When compared with ECG of 24-MAR-2019 16:03,  No significant change was found  Confirmed by YVONNE SHAFER, JOEL ([id number]) on 3/29/2019 7:16:21 PM    Assessment/Plan:      Recurrent, severe orthostatic hypotension  -She was taking her midodrine 15mg PO QD rather than TID as needed   -Currently on midodrine tablet 15 mg, 15 mg, Oral, TID  -Plan for GUICHO hose and abdominal binder  -fludrocortisone tablet 100 mcg, 100 mcg, Oral, BID  -Cardiology consult noted and appreciated: thank you   -Recommend midodrine 10 mg as follows:   -On dialysis days:    -At 4 am, 12 noon (as she is finishing up dialysis) and 4 pm   -On non dialysis days:     At 8 am, 12 noon and 4 pm   -If this regimen works, then Florinef can either be decreased or withdrawn.   -She should fup in the Cardiology or Electrophysiology clinic   -Of note patient is V-paced 70s at baseline.    -Do not recommend increasing back up rate as  questioned by primary team   -It would not benefit patient from orthostatic hypotension standpoint    Chronic combined Systolic / Diastolic Heart Failure:  -Most recent echo on (3/11) reveals an EF of 35% with diastolic dysfunction  -Difficult case as severe orthostatic hypotension precluding proper therapy  -Continue symptomatic management with HD as pt is anuric.   -She is not on BB, ACE/ARB or spironolactone due to hypotension   -Continue to monitor on telemetry  -Monitor intake and output and obtain daily STANDING weights  -Fluid restriction to 1.5L per day and cardiac diet with salt restriction     Secondary adrenal insufficiency  -this has been a chronic issue for patient but had recently become much worse  -prednisone increased to 20mg daily last admit  -fludrocortisone tablet 100 mcg, 100 mcg, Oral, BID  -endocrine recommendations noted and appreciated  -Per Endocrine:  -Recommend reducing PDN down to 10 mg once daily at discharge, which is her maintenance dose.  -Okay to continue fludrocortisone 100 mcg daily, but this isn't necessary from an adrenal standpoint.  -Patient has been on high dose prednisone for ~ the past 5 years           -Due for repeat DXA, which can be done outpatient     End stage renal failure on dialysis  -Nephrology consulted for HD: due today  -Continue to monitor renal function with daily labs   -Avoid nephrotoxins  -Renally dose all medications and renal diet  -Monitor events that may lead to decreased renal perfusion (hypovolemia, hypotension, sepsis).   -sevelamer carbonate tablet 800 mg, 800 mg, Oral, TID WM     Atrial Fibrillation  Pacemaker in situ  -Pt is currently rate controlled with HR <110   -In afib but V-paced  -[id number] score >2  -warfarin (COUMADIN) tablet 5 mg, 5 mg, Oral, Daily  -not on any rate control agents  -continue to monitor on telemetry  -continue to monitor INR daily: 2.6-->1.8 today  -Maintain K > 4, Mg > 2     Chronic respiratory failure with  hypoxia  -due to pulmonary sarcoidosis  -O2 at 2L to titrate down or up as needed  -oxygen to keep sats >90%  -albuterol/ipratropium nebs q4H PRN wheezing and sob      Diabetes Mellitus 2, uncontrolled, neuropathy  -Last HbA1c: 6.2 on 3/10/19  -SSI with accuchecks qACHS  -BG goal: Preprandial blood glucose target <140 mg/dL, Random glucoses <180 mg/dL  -2000 ADA diet   -nortriptyline capsule 25 mg, 25 mg, Oral, QHS  -pregabalin capsule 100 mg, 100 mg, Oral, TID  -gabapentin capsule 600 mg, 600 mg, Oral, Daily      Hyperlipidemia  -chronic and stable  -continue atorvastatin tablet 80 mg, 80 mg, Oral, QHS     Pulmonary hypertension  -follows with pulmonology here  -continue home O2     Morbid obesity with BMI of 40.0-44.9, adult  -counseled on diet and exercise     Seizure disorder  -chronic and stable  -continue levETIRAcetam tablet 500 mg, 500 mg, Oral, BID  -maintain seizure precautions     Pulmonary sarcoidosis  -continue steroids              -predniSONE tablet 20 mg, 20 mg, Oral, Daily  -continue home oxygen     GERD  -continue pantoprazole EC tablet 40 mg, 40 mg, Oral, Daily     Recurrent graft stenosis  -aspirin EC tablet 81 mg, 81 mg, Oral, Daily  -clopidogrel tablet 75 mg, 75 mg, Oral, Daily    Anemia in ESRD  -Epo as per Nephrology with HD  -Ordered B12, folate, Fe studies as has been a while     Leukocytosis  -Resolved  -Suspect de-margination from stress of acute hypotension  -No evidence to support infection    VTE Risk Mitigation (From admission, onward)        Ordered     warfarin (COUMADIN) tablet 5 mg  Daily      03/29/19 0338     IP VTE HIGH RISK PATIENT  Once      03/29/19 0338             DWIGHT Thompson MD  Department of Hospital Medicine   [facility]

## 2019-04-01 NOTE — DISCHARGE SUMMARY
[facility] Hospital Medicine  Discharge Summary      Patient Name: Sisi Medel  MRN: [id number]  Admission Date: 3/28/2019  Hospital Length of Stay: 3 days  Discharge Date and Time:  04/01/2019 11:38 AM  Attending Physician: BOB Thompson MD   Discharging Provider: DWIGHT Thompson MD  Primary Care Provider: Carlos A Caputo MD    [facility] Medicine Team: [facility] DWIGHT Thompson MD    Principal Problem:Orthostatic hypotension     HPI: Ms. Patricio is a pleasant 57 yo lady w/ pulmonary sarcoidosis on 5 L home oxygen, Class 3 HFrEF (3/2019 EF 35%), perm Afib/flutter s/p 6/2017 AVN ablation & PPM, ESRD (TTS), Type 2 DM (3/2019 A1c 6.2%) and recently diagnosed renal insufficiency secondary to chronic steroid therapy (now on pred 20 qd) who presents today c/o orthostatic hypotension.  She was recently admitted from 3/24 - 3/26 where she was having similar complaints despite being on midodrine 15 mg po TID and found to have adrenal insufficiency.  During that admission her prednisone was increased from 10 to 20 mg po qd with improvement in her symptoms.  The day after discharge she reports recurrent orthostatic hypotension and dyspnea on exertion with her activity now limited to less than a half block.  Today her home health nurse and home PT found her SBP to be in the 70s whenever she was standing or active.  On evaluation she reports that her light headedness is not present when she is laying down.  In the ED she was given vanc & zosyn for possible sepsis and ICU consulted for lactic acidosis.  She was given 500 cc LR but her lactic acid still increased to 5.5 from 4.8. She will be admitted to MICU for further monitoring.        * No surgery found *      Hospital Course:   Recurrent, severe orthostatic hypotension  -She was taking her midodrine 15mg PO QD rather than TID as needed              -Currently on midodrine tablet 10 mg, Oral, TID  -Plan for GUICHO hose and abdominal  binder  -fludrocortisone tablet 100 mcg, 100 mcg, Oral, BID  -Cardiology consult noted and appreciated: thank you              -Recommend midodrine 10 mg as follows:              -On dialysis days:                          -At 4 am, 12 noon (as she is finishing up dialysis) and 4 pm              -On non dialysis days:                           At 8 am, 12 noon and 4 pm              -If this regimen works, then Florinef can either be decreased or withdrawn.              -She should fup in the Cardiology or Electrophysiology clinic          -Of note patient is V-paced 70s at baseline.           -Do not recommend increasing back up rate as questioned by primary team          -It would not benefit patient from orthostatic hypotension standpoint     Chronic combined Systolic / Diastolic Heart Failure:  -Most recent echo on (3/11) reveals an EF of 35% with diastolic dysfunction  -Difficult case as severe orthostatic hypotension precluding proper therapy  -Continue symptomatic management with HD as pt is anuric.   -She is not on BB, ACE/ARB or spironolactone due to hypotension   -Continue to monitor on telemetry  -Monitor intake and output and obtain daily STANDING weights  -Fluid restriction to 1.5L per day and cardiac diet with salt restriction     Secondary adrenal insufficiency  -this has been a chronic issue for patient but had recently become much worse  -prednisone increased to 20mg daily last admit  -fludrocortisone tablet 100 mcg, 100 mcg, Oral, BID  -endocrine recommendations noted and appreciated  -Per Endocrine:  -Recommend reducing PDN down to 10 mg once daily at discharge, which is her maintenance dose.  -Okay to continue fludrocortisone 100 mcg daily, but this isn't necessary from an adrenal standpoint.  -Patient has been on high dose prednisone for ~ the past 5 years           -Due for repeat DXA, which can be done outpatient     End stage renal failure on dialysis  -Nephrology consulted for HD: due  today  -Continue to monitor renal function with daily labs   -Avoid nephrotoxins  -Renally dose all medications and renal diet  -Monitor events that may lead to decreased renal perfusion (hypovolemia, hypotension, sepsis).   -sevelamer carbonate tablet 800 mg, 800 mg, Oral, TID WM     Atrial Fibrillation  Pacemaker in situ  -Pt is currently rate controlled with HR <110              -In afib but V-paced  -[id number] score >2  -warfarin (COUMADIN) tablet 5 mg, 5 mg, Oral, Daily  -not on any rate control agents  -continue to monitor on telemetry  -continue to monitor INR daily: 2.6-->1.8 today  -Maintain K > 4, Mg > 2     Chronic respiratory failure with hypoxia  -due to pulmonary sarcoidosis  -O2 at 2L to titrate down or up as needed  -oxygen to keep sats >90%  -albuterol/ipratropium nebs q4H PRN wheezing and sob     Diabetes Mellitus 2, uncontrolled, neuropathy  -Last HbA1c: 6.2 on 3/10/19  -SSI with accuchecks qACHS  -BG goal: Preprandial blood glucose target <140 mg/dL, Random glucoses <180 mg/dL  -2000 ADA diet   -nortriptyline capsule 25 mg, 25 mg, Oral, QHS  -pregabalin capsule 100 mg, 100 mg, Oral, TID  -gabapentin capsule 600 mg, 600 mg, Oral, Daily     Hyperlipidemia  -chronic and stable  -continue atorvastatin tablet 80 mg, 80 mg, Oral, QHS     Pulmonary hypertension  -follows with pulmonology here  -continue home O2     Morbid obesity with BMI of 40.0-44.9, adult  -counseled on diet and exercise     Seizure disorder  -chronic and stable  -continue levETIRAcetam tablet 500 mg, 500 mg, Oral, BID  -maintain seizure precautions     Pulmonary sarcoidosis  -continue steroids              -predniSONE tablet 20 mg, 20 mg, Oral, Daily  -continue home oxygen     GERD  -continue pantoprazole EC tablet 40 mg, 40 mg, Oral, Daily     Recurrent graft stenosis  -aspirin EC tablet 81 mg, 81 mg, Oral, Daily  -clopidogrel tablet 75 mg, 75 mg, Oral, Daily     Anemia in ESRD  -Epo as per Nephrology with HD  -Ordered B12,  folate, Fe studies as has been a while      Leukocytosis  -Resolved  -Suspect de-margination from stress of acute hypotension  -No evidence to support infection      Consults:   Consults (From admission, onward)        Status Ordering Provider     Inpatient consult to Cardiology  Once     Provider:  (Not yet assigned)    Completed BOB BUCKLEY     Inpatient consult to Critical Care Medicine  Once     Provider:  (Not yet assigned)    Completed JESSICA AGUSTIN     Inpatient consult to Endocrinology  Once     Provider:  (Not yet assigned)    Completed YAMIL ADAMES AUGUST     Inpatient consult to Midline team  Once     Provider:  (Not yet assigned)    Completed KALIA CARY     Inpatient consult to Nephrology  Once     Provider:  (Not yet assigned)    Completed YAMIL ADAMES AUGUST     IP consult to case management  Once     Provider:  (Not yet assigned)    Acknowledged BOB BUCKLEY          Final Active Diagnoses:    Diagnosis Date Noted POA    PRINCIPAL PROBLEM:  Orthostatic hypotension [I95.1] 03/25/2019 Yes    Lactic acidemia [E87.2] 03/29/2019 Yes    Permanent atrial fibrillation [I48.2] 06/12/2017 Yes    Pulmonary hypertension [I27.20] 07/24/2014 Yes    Secondary adrenal insufficiency [E27.49] 02/04/2017 Yes    Pulmonary sarcoidosis [D86.0] 03/19/2013 Yes    Seizure disorder [G40.909] 03/19/2013 Yes    Severe obesity (BMI 35.0-35.9 with comorbidity) [E66.01, Z68.35] 03/06/2019 Not Applicable    Morbid obesity with BMI of 40.0-44.9, adult [E66.01, Z68.41] 08/15/2013 Not Applicable     Chronic    Hyperlipidemia [E78.5] 03/07/2016 Yes    AV fistula thrombosis, subsequent encounter [T82.868D] 03/29/2019 Not Applicable    Diabetes mellitus type II, controlled [E11.9] 12/08/2017 Yes    End stage renal failure on dialysis [N18.6, Z99.2] 10/12/2017 Not Applicable    Biventricular cardiac pacemaker in situ [Z95.0] 08/04/2017 Yes    CRLD (chronic restrictive lung disease) [J98.4] 03/06/2017 Yes     Current chronic use of systemic steroids [Z79.52] 11/18/2016 Not Applicable    Chronic gout due to 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    Specialty:  Internal Medicine  Contact information:  [address]  [facility] [address]  [phone]             [facility] months.    Specialty:  Cardiology  Contact information:  [address] [facility] [id number] [phone]  Additional information:  3rd floor           [facility] ENDOCRINOLOGY In 2 months.    Specialty:  Endocrinology  Contact information:  [address] [facility] [id number] [phone]           [facility] - Nephrology In 2 months.    Specialty:  Nephrology  Contact information:  [address] [facility] [id number] [phone]  Additional information:  [facility] [place] - 5th Floor               Patient Instructions:      Ambulatory referral to Endocrinology   Referral Priority: Routine Referral Type: Consultation   Requested Specialty: Endocrinology   Number of Visits Requested: 1     Ambulatory referral to Cardiology   Referral Priority: Routine Referral Type: Consultation   Referral Reason: Specialty Services Required   Requested Specialty: Cardiology   Number of Visits Requested: 1     Ambulatory referral to Nephrology   Referral Priority: Routine Referral Type: Consultation   Referral Reason: Specialty Services Required   Requested Specialty: Nephrology   Number of Visits Requested: 1     Diet diabetic     Diet renal     No driving until:     Notify your health care provider if you experience any of the following:  temperature >100.4     Notify your health care provider if you experience any of the  following:  persistent nausea and vomiting or diarrhea     Notify your health care provider if you experience any of the following:  severe uncontrolled pain     Notify your health care provider if you experience any of the following:  redness, tenderness, or signs of infection (pain, swelling, redness, odor or green/yellow discharge around incision site)     Notify your health care provider if you experience any of the following:  difficulty breathing or increased cough     Notify your health care provider if you experience any of the following:  severe persistent headache     Notify your health care provider if you experience any of the following:  worsening rash     Notify your health care provider if you experience any of the following:  persistent dizziness, light-headedness, or visual disturbances     Notify your health care provider if you experience any of the following:  increased confusion or weakness     Activity as tolerated     Medications:  Reconciled Home Medications:      Medication List      CHANGE how you take these medications    blood sugar diagnostic Strp  1 each by Misc.(Non-Drug; Combo Route) route once daily.  What changed:  when to take this     clopidogrel 75 mg tablet  Commonly known as:  PLAVIX  TAKE 1 TABLET(75 MG) BY MOUTH EVERY DAY  What changed:  Another medication with the same name was removed. Continue taking this medication, and follow the directions you see here.     lancets Misc  1 each by Misc.(Non-Drug; Combo Route) route once daily.  What changed:  when to take this     midodrine 10 MG tablet  Commonly known as:  PROAMATINE  Take 1 tablet (10 mg total) by mouth 3 (three) times daily.  What changed:    · medication strength  · how much to take  · when to take this     nortriptyline 25 MG capsule  Commonly known as:  PAMELOR  Take 1 capsule by mouth nightly  What changed:  Another medication with the same name was removed. Continue taking this medication, and follow the  directions you see here.     predniSONE 20 MG tablet  Commonly known as:  DELTASONE  Take 0.5 tablets (10 mg total) by mouth once daily.  What changed:  how much to take     tiZANidine 4 MG tablet  Commonly known as:  ZANAFLEX  TAKE 1 TABLET BY MOUTH EVERY DAY AS NEEDED FOR MUSCLE SPASMS  What changed:    · how much to take  · how to take this  · when to take this     warfarin 5 MG tablet  Commonly known as:  COUMADIN  TAKE 1 TABLET BY MOUTH EVERY DAY EXCEPT TH 1AND 1/2 TABLETS ON MONDAY AND FRIDAY OR AS DIRECTED  What changed:  See the new instructions.        CONTINUE taking these medications    aspirin 81 MG EC tablet  Commonly known as:  ECOTRIN  Take 81 mg by mouth once daily.     atorvastatin 80 MG tablet  Commonly known as:  LIPITOR  TAKE 1 TABLET BY MOUTH EVERY EVENING     cinacalcet 30 MG Tab  Commonly known as:  SENSIPAR  Take 30 mg by mouth daily with breakfast.     fludrocortisone 0.1 mg Tab  Commonly known as:  FLORINEF  Take 100 mcg by mouth 2 (two) times daily.     gabapentin 600 MG tablet  Commonly known as:  NEURONTIN  Take 600 mg by mouth 2 (two) times daily.     levETIRAcetam 500 MG Tab  Commonly known as:  KEPPRA  TAKE 1 TABLET BY MOUTH TWICE DAILY     omeprazole 20 MG capsule  Commonly known as:  PRILOSEC  TAKE 1 CAPSULE BY MOUTH EVERY DAY     patiromer calcium sorbitex 16.8 gram Pwpk  Commonly known as:  VELTASSA  Take 33.6 g by mouth once daily.     prednisoLONE acetate 1 % Drps  Commonly known as:  PRED FORTE  INSTILL ONE DROP INTO  LEFT EYE THREE TIMES A DAY     pregabalin 100 MG capsule  Commonly known as:  LYRICA  Take 1 capsule (100 mg total) by mouth 3 (three) times daily.     DENISE-LINDA 0.8 mg Tab  Generic drug:  B complex-vitamin C-folic acid  TAKE 1 TABLET BY MOUTH ONCE DAILY     RENVELA 800 mg Tab  Generic drug:  sevelamer carbonate  TAKE 1 TABLET BY MOUTH THREE TIMES DAILY WITH MEALS     TRADJENTA 5 mg Tab tablet  Generic drug:  linagliptin  TAKE 1 TABLET(5 MG) BY MOUTH EVERY DAY             Significant Diagnostic Studies:   Recent Results (from the past 24 hour(s))   POCT glucose    Collection Time: 03/31/19 12:25 PM   Result Value Ref Range    POCT Glucose 160 (H) 70 - 110 mg/dL   POCT glucose    Collection Time: 03/31/19  4:53 PM   Result Value Ref Range    POCT Glucose 176 (H) 70 - 110 mg/dL   POCT glucose    Collection Time: 03/31/19 10:27 PM   Result Value Ref Range    POCT Glucose 131 (H) 70 - 110 mg/dL   Comprehensive metabolic panel    Collection Time: 04/01/19  3:46 AM   Result Value Ref Range    Sodium 135 (L) 136 - 145 mmol/L    Potassium 5.1 3.5 - 5.1 mmol/L    Chloride 98 95 - 110 mmol/L    CO2 22 (L) 23 - 29 mmol/L    Glucose 111 (H) 70 - 110 mg/dL    BUN, Bld 55 (H) 6 - 20 mg/dL    Creatinine 7.7 (H) 0.5 - 1.4 mg/dL    Calcium 7.8 (L) 8.7 - 10.5 mg/dL    Total Protein 6.4 6.0 - 8.4 g/dL    Albumin 2.9 (L) 3.5 - 5.2 g/dL    Total Bilirubin 0.8 0.1 - 1.0 mg/dL    Alkaline Phosphatase 82 55 - 135 U/L    AST 26 10 - 40 U/L    ALT 16 10 - 44 U/L    Anion Gap 15 8 - 16 mmol/L    eGFR if African American 6.1 (A) >60 mL/min/1.73 m^2    eGFR if non  5.3 (A) >60 mL/min/1.73 m^2   Magnesium    Collection Time: 04/01/19  3:46 AM   Result Value Ref Range    Magnesium 2.2 1.6 - 2.6 mg/dL   Phosphorus    Collection Time: 04/01/19  3:46 AM   Result Value Ref Range    Phosphorus 5.4 (H) 2.7 - 4.5 mg/dL   CBC auto differential    Collection Time: 04/01/19  3:46 AM   Result Value Ref Range    WBC 11.83 3.90 - 12.70 K/uL    RBC 3.94 (L) 4.00 - 5.40 M/uL    Hemoglobin 10.9 (L) 12.0 - 16.0 g/dL    Hematocrit 35.7 (L) 37.0 - 48.5 %    MCV 91 82 - 98 fL    MCH 27.7 27.0 - 31.0 pg    MCHC 30.5 (L) 32.0 - 36.0 g/dL    RDW 17.8 (H) 11.5 - 14.5 %    Platelets 171 150 - 350 K/uL    MPV 13.1 (H) 9.2 - 12.9 fL    Immature Granulocytes 0.8 (H) 0.0 - 0.5 %    Gran # (ANC) 8.3 (H) 1.8 - 7.7 K/uL    Immature Grans (Abs) 0.09 (H) 0.00 - 0.04 K/uL    Lymph # 1.4 1.0 - 4.8 K/uL    Mono # 1.9 (H) 0.3  - 1.0 K/uL    Eos # 0.1 0.0 - 0.5 K/uL    Baso # 0.02 0.00 - 0.20 K/uL    nRBC 0 0 /100 WBC    Gran% 70.3 38.0 - 73.0 %    Lymph% 12.2 (L) 18.0 - 48.0 %    Mono% 15.7 (H) 4.0 - 15.0 %    Eosinophil% 0.8 0.0 - 8.0 %    Basophil% 0.2 0.0 - 1.9 %    Differential Method Automated    Protime-INR    Collection Time: 04/01/19  3:46 AM   Result Value Ref Range    Prothrombin Time 18.0 (H) 9.0 - 12.5 sec    INR 1.8 (H) 0.8 - 1.2   POCT glucose    Collection Time: 04/01/19  7:59 AM   Result Value Ref Range    POCT Glucose 89 70 - 110 mg/dL     XR CHEST AP PORTABLE    CLINICAL HISTORY:  hypotension;    TECHNIQUE:  Single frontal view of the chest was performed.    COMPARISON:  Chest radiograph 03/24/2019    FINDINGS:  No detrimental change.  Monitoring leads overlie the chest.  Resolution is limited by body habitus with underpenetration.    Right chest dual lead cardiac device is unchanged.  Cardiomediastinal silhouette remains midline and stable without convincing evidence of failure, noting overall improved aeration of both lungs from 03/24/2019 exam.  No large pleural effusion or pneumothorax.  No definite new focal opacity.  No acute osseous process seen.  PA and lateral views can be obtained.       Impression         Cardiac device without detrimental change or radiographic acute intrathoracic process seen on this limited single view.      Electronically signed by: Jeancarlos Kohler MD  Date: 03/28/2019      28-MAR-2019 15:29:37 EKG    Atrial fibrillation  Vent. rate 56 BPM  IA interval 186 ms  QRS duration 180 ms  QT/QTc 508/490 ms  Ventricular-paced rhythm  Premature ventricular complexes  Abnormal ECG  When compared with ECG of 24-MAR-2019 16:03,  No significant change was found  Confirmed by JOEL RUSSELL MD ([id number]) on 3/29/2019 7:16:21 PM             Pending Diagnostic Studies:     Procedure Component Value Units Date/Time    Brain natriuretic peptide [[id number]] Collected:  03/28/19 2036    Order Status:  Sent Lab Status:   In process Updated:  03/28/19 2036    Specimen:  Blood     Calcitriol [[id number]] Collected:  03/29/19 0552    Order Status:  Sent Lab Status:  In process Updated:  03/29/19 0930    Specimen:  Blood     Lactic acid, plasma [[id number]] Collected:  03/29/19 1531    Order Status:  Sent Lab Status:  In process Updated:  03/29/19 1532    Specimen:  Blood         Indwelling Lines/Drains at time of discharge:   Lines/Drains/Airways     Drain                 Hemodialysis AV Graft Left upper arm -- days                Time spent on the discharge of patient: 40 minutes  Patient was seen and examined on the date of discharge and determined to be suitable for discharge.         DWIGHT Thompson MD  Department of Hospital Medicine  [facility]

## 2019-04-01 NOTE — PLAN OF CARE
Problem: Diabetes Comorbidity  Goal: Blood Glucose Level Within Desired Range  Outcome: Ongoing (interventions implemented as appropriate)  Intervention: Maintain Glycemic Control     04/01/19 0034   Monitor and Manage Ketoacidosis   Glycemic Management blood glucose monitoring

## 2019-04-01 NOTE — PLAN OF CARE
Pt discharged home to resume HHPT.     04/01/19 1446   Final Note   Assessment Type Final Discharge Note   Anticipated Discharge Disposition Home-Health   What phone number can be called within the next 1-3 days to see how you are doing after discharge? [phone]   Hospital Follow Up  Appt(s) scheduled? Yes   Right Care Referral Info   Post Acute Recommendation Home-care   Carlos A Caputo MD  Future Appointments   Date Time Provider Department Center   4/3/2019  8:30 AM Claudia Ron DPM [facility] POD [facility]   4/3/2019  9:30 AM LAB, COUMADIN [facility] COUMAD [facility]   4/5/2019  7:40 AM Dorita Haywood, NP [facility] WOUND [facility]   4/10/2019 12:30 PM Carrie Brito, XINP [facility] IMPRICL [facility]   4/10/2019  1:30 PM LAB, APPOINTMENT [place] [facility]   4/10/2019  2:20 PM Timoteo Alfaro MD [facility] HEARTTX [facility]   4/24/2019 10:30 AM [facility] EMG PROCEDURAL LAB [facility] NEURO [facility]   5/10/2019 10:30 AM Jj Altamirano MD [facility] PULMSVC [facility]   5/24/2019  8:00 AM Emeka Lester MD [facility] NEURO [facility]   7/1/2019  9:00 AM Annette Hernandez NP [facility] ENDODIA [facility]   7/2/2019  8:00 AM HOME MONITOR DEVICE CHECK, [facility] ARRHPRO [facility]   7/5/2019  9:00 AM Carlos A Caputo MD [facility] IM [facility]   10/1/2019  8:00 AM HOME MONITOR DEVICE CHECK, [facility] ARRHPRO [facility]     Payor: [employer] MANAGED MEDICARE / Plan: [employer] [facility] / Product Type: Medicare Advantage /     [facility] [facility] [address] [place], [place] - [address]  [address]  [facility] [address]  Phone: [phone] Fax: [phone]

## 2019-04-01 NOTE — PLAN OF CARE
04/01/19 1505   Discharge Assessment   Assessment Type Discharge Planning Assessment   Confirmed/corrected address and phone number on facesheet? Yes   Assessment information obtained from? Patient   Expected Length of Stay (days) 3   Communicated expected length of stay with patient/caregiver yes   Prior to hospitilization cognitive status: Alert/Oriented;No Deficits   Prior to hospitalization functional status: Assistive Equipment   Current cognitive status: Alert/Oriented;No Deficits   Current Functional Status: Assistive Equipment   Facility Arrived From: ED   Lives With spouse   Able to Return to Prior Arrangements yes   Is patient able to care for self after discharge? Yes   Who are your caregiver(s) and their phone number(s)? Maikel Magallanes [phone]    Patient's perception of discharge disposition home health;home or selfcare   Readmission Within the Last 30 Days other (see comments)  (COPD exacerbation )   If yes, [facility]   Patient currently being followed by outpatient case management? No   Patient currently receives any other outside agency services? Yes   How many hours a day does the patient receive services?   (2xW)   Name and contact number of agency or person providing outside services pt doesn't remmerber    Is it the patient/care giver preference to resume care with the current outside agency? Yes   Equipment Currently Used at Home walker, rolling;wound care supplies;shower chair;rollator;glucometer;oxygen;bedside commode;cane, quad   Do you have any problems affording any of your prescribed medications? No   Is the patient taking medications as prescribed? yes   Does the patient have transportation home? Yes   Transportation Anticipated family or friend will provide   Dialysis Name and Scheduled days TTS   Does the patient receive services at the [facility]? Yes   Discharge Plan A Home with family;Home   Discharge Plan B Home Health   DME  Needed Upon Discharge  none   Patient/Family in Agreement with Plan yes   Readmission Questionnaire   At the time of your discharge, did someone talk to you about what your health problems were? Yes   At the time of discharge, did someone talk to you about what to watch out for regarding worsening of your health problem? Yes   At the time of discharge, did someone talk to you about what to do if you experienced worsening of your health problem? Yes   At the time of discharge, did someone talk to you about which medication to take when you left the hospital and which ones to stop taking? Yes   At the time of discharge, did someone talk to you about when and where to follow up with a doctor after you left the hospital? Yes   What do you believe caused you to be sick enough to be re-admitted?   (fluid overload)   How often do you need to have someone help you when you read instructions, pamphlets, or other written material from your doctor or pharmacy? Never   Do you have problems taking your medications as prescribed? No   Do you have any problems affording any of  your prescribed medications? No   Do you have problems obtaining/receiving your medications? No   Does the patient have transportation to healthcare appointments? Yes   Living Arrangements apartment   Does the patient have family/friends to help with healtcare needs after discharge? yes   Does your caregiver provide all the help you need? Yes   Are you currently feeling confused? No   Are you currently having problems thinking? No   Are you currently having memory problems? No   Have you felt down, depressed, or hopeless? 0   Have you felt little interest or pleasure in doing things? 0   In the last 7 days, my sleep quality was: good

## 2019-04-03 NOTE — PROGRESS NOTES
Patient is here for a quick follow up status post recent hospital admit.   Patient reports no antibiotics or new meds since her recent hospital admit. She was resumed on her previous coumadin dose of 5mg daily. She has multiple other appointments here 4/10 and requests this date for follow up. Patient was re-educated on situations that would require placing a call to the coumadin clinic, including bleeding or unusual bruising issues, changes in health, diet or medications, upcoming procedures that require warfarin interruption, and missed coumadin dose(s). Patient expressed understanding that avoidance of consistency with these parameters could cause fluctuations in INR, leading to more frequent visits and increase risk of adverse events.

## 2019-04-03 NOTE — TELEPHONE ENCOUNTER
Spoke to pt. Pt is out, and about and  confirmed the message below. Pt says she takes Midodrine 10mg tid, and hope this keeps BP . Pt has appt with Dr Alfaro on 4/10/19 and plans to come to clinic then. Pt says she does still have slight blurred vision and dizziness. Advised pt that if this persists she should go to ER, but she also should not be driving.  After reviewing pts records in [facility] and discussing Nephrology care with pt tpt says she only sees Nephrologist in Dialysis 3 days a week and they always says they dont know what to do about her BP being low. Pt says she was seeing a Dr Roberts at [facility]  months ago but theres no record of that in [facility]. I asked pt to call Melina to find out what Nephrologist that sees her in Dialysis can see her as an outpatient and make an outpatient appt stat. Patient agreed, and said she'd do so today. I also asked pt to please bring MD name and contact to clinic with her when she sees Dr Alfaro next week. Pt verbalized understanding.

## 2019-04-03 NOTE — TELEPHONE ENCOUNTER
----- Message from Babita Rouse LPN sent at 4/3/2019  9:37 AM CDT -----  Spoke with patient during [facility] call, states that she is still experiencing low BP. States this morning her BP was 78/56. States she took Midodrine 10 mg and BP went up to 105/63. Patient also reports dizziness and blurred vision. Please contact patient as soon as possible to discuss.      Respectfully,  Babita Rouse LPN  [facility]    [email or web address]       Please do not reply to this message, as this inbox is not routinely monitored.

## 2019-04-04 NOTE — PRE-PROCEDURE INSTRUCTIONS
Preop instructions: NPO solids/ milk products after midnight or clears up to 2 hours before arrival (clear liquids are: water, apple juice, Gatorade & Jell-O, black coffee/no milk, cream or creamer), shower instructions, directions, leave all valuables at home, medication instructions for PM prior & am of procedure explained. Patient stated an understanding.      Patient denies any side effects or issues with anesthesia or sedation.                                                                                                                                    PT ON HOME O2 - 2-5 L

## 2019-04-04 NOTE — TELEPHONE ENCOUNTER
Patient is following with cardiology / CHF clinic who is managing her BP medications -- they have called her back with appropriate instructions.   She [facility] as well as with Dr Alfaro in CHF clinic.

## 2019-04-05 PROBLEM — N18.6 ESRD (END STAGE RENAL DISEASE): Status: ACTIVE | Noted: 2019-01-01

## 2019-04-05 NOTE — PLAN OF CARE
This writer was informed that the patient's case was cancelled . MD spoke to patient.   Client discharged to home with family and belongings. The patient is stable and in no apparent distress, vitals stable, no c/o  pain or discomfort. Patient left ambulatory with friend.

## 2019-04-05 NOTE — PLAN OF CARE
Patient assigned to this writer by charge nurse. The patient was  escorted to [facility] room 16 by [facility]. The patient is currently  changing into a hospital gown. This writer is completing a chart review and reviewing MD orders.

## 2019-04-05 NOTE — ANESTHESIA PREPROCEDURE EVALUATION
04/05/2019  Sisi Medel is a 58 y.o., female.    Pre-operative evaluation for Procedure(s) (LRB):  Fistulogram (Left)    Encounter Diagnosis   Name Primary?    ESRD (end stage renal disease)        Review of patient's allergies indicates:   Allergen Reactions    Bactrim [sulfamethoxazole-trimethoprim] Other (See Comments)     Causes renal failure    Nsaids (non-steroidal anti-inflammatory drug) Other (See Comments)     Renal failure       No current facility-administered medications on file prior to encounter.      Current Outpatient Medications on File Prior to Encounter   Medication Sig Dispense Refill    aspirin (ECOTRIN) 81 MG EC tablet Take 81 mg by mouth once daily.      atorvastatin (LIPITOR) 80 MG tablet TAKE 1 TABLET BY MOUTH EVERY EVENING 90 tablet 1    cinacalcet (SENSIPAR) 30 MG Tab Take 30 mg by mouth daily with breakfast.      clopidogrel (PLAVIX) 75 mg tablet TAKE 1 TABLET(75 MG) BY MOUTH EVERY DAY 90 tablet 0    gabapentin (NEURONTIN) 600 MG tablet Take 600 mg by mouth 2 (two) times daily.       nortriptyline (PAMELOR) 25 MG capsule Take 1 capsule by mouth nightly  1    omeprazole (PRILOSEC) 20 MG capsule TAKE 1 CAPSULE BY MOUTH EVERY DAY 90 capsule 4    patiromer calcium sorbitex (VELTASSA) 16.8 gram PwPk Take 33.6 g by mouth once daily.       pregabalin (LYRICA) 100 MG capsule Take 1 capsule (100 mg total) by mouth 3 (three) times daily. 270 capsule 1    DENISE-LINDA 0.8 mg Tab TAKE 1 TABLET BY MOUTH ONCE DAILY  0    RENVELA 800 mg Tab TAKE 1 TABLET BY MOUTH THREE TIMES DAILY WITH MEALS 90 tablet 0    tiZANidine (ZANAFLEX) 4 MG tablet TAKE 1 TABLET BY MOUTH EVERY DAY AS NEEDED FOR MUSCLE SPASMS (Patient taking differently: TAKE 1 TABLET BY MOUTH EVERY NIGHT  AS NEEDED FOR MUSCLE SPASMS) 90 tablet 0    TRADJENTA 5 mg Tab tablet TAKE 1 TABLET(5 MG) BY MOUTH EVERY DAY 90  tablet 0    warfarin (COUMADIN) 5 MG tablet TAKE 1 TABLET BY MOUTH EVERY DAY EXCEPT TH 1AND 1/2 TABLETS ON MONDAY AND FRIDAY OR AS DIRECTED (Patient taking differently: TAKE 1 TABLET BY MOUTH EVERY DAY OR AS DIRECTED) 120 tablet 0    blood sugar diagnostic Strp 1 each by Misc.(Non-Drug; Combo Route) route once daily. (Patient taking differently: 1 each by Misc.(Non-Drug; Combo Route) route 2 (two) times daily. ) 100 each 3    lancets Misc 1 each by Misc.(Non-Drug; Combo Route) route once daily. (Patient taking differently: 1 each by Misc.(Non-Drug; Combo Route) route 2 (two) times daily. ) 100 each 3    prednisoLONE acetate (PRED FORTE) 1 % DrpS INSTILL ONE DROP INTO  LEFT EYE THREE TIMES A DAY  3       Social History     Tobacco Use   Smoking Status Former Smoker    Packs/day: 0.50    Years: 10.00    Pack years: 5.00    Types: Cigarettes    Last attempt to quit: 1994    Years since quittin.9   Smokeless Tobacco Never Used       Social History     Substance and Sexual Activity   Alcohol Use No    Alcohol/week: 0.0 oz    Comment: rarely       Patient Active Problem List   Diagnosis    Pulmonary sarcoidosis    Seizure disorder    Morbid obesity with BMI of 40.0-44.9, adult    Pulmonary hypertension    Hyperlipidemia    Chronic combined systolic and diastolic heart failure    Chronic gout due to renal impairment without tophus    Arteriovenous fistula stenosis    Chronic respiratory failure with hypoxia    Current chronic use of systemic steroids    Hypoalbuminemia    GERD (gastroesophageal reflux disease)    Secondary adrenal insufficiency    Vitamin D insufficiency    Weakness generalized    CRLD (chronic restrictive lung disease)    Polyneuropathy associated with underlying disease    PHN (postherpetic neuralgia)    Osteopenia determined by x-ray    Permanent atrial fibrillation    Biventricular cardiac pacemaker in situ    Arteriovenous fistula occlusion    AV graft  stenosis    End stage renal failure on dialysis    Complication of arteriovenous dialysis fistula    Episode of transient neurologic symptoms    Multinodular thyroid    Diabetes mellitus type II, controlled    Hyperkalemia    AV fistula thrombosis    Muscle weakness    Gait instability    AV graft malfunction    Cardiomyopathy    Elevated alkaline phosphatase level    Onychomycosis    Ulcer of right lower extremity with fat layer exposed    Arteriovenous graft stenosis    Severe obesity (BMI 35.0-35.9 with comorbidity)    Folliculitis    Skin ulcer    Healthcare-associated pneumonia    Acute on chronic combined systolic (congestive) and diastolic (congestive) heart failure    ESRD on hemodialysis    Orthostatic hypotension    Idiopathic hypotension    Lactic acidemia    AV fistula thrombosis, subsequent encounter    Hypocalcemia    Hyponatremia    Ulcer of lower limb    ESRD (end stage renal disease)       Past Surgical History:   Procedure Laterality Date    ABDOMINAL SURGERY      ABLATION N/A 2017    Performed by [facility] at [facility] CATH LAB    ANGIOPLASTY Left 1/10/2018    Performed by Torrey Villafana MD at [facility] OR 2ND FLR    ANGIOPLASTY-PERCUTANEOUS TRANSLUMINAL (PTA) Left 2017    Performed by Tor[facility]ana MD at [facility] OR 2ND FLR    ANGIOPLASTY-PERCUTANEOUS TRANSLUMINAL (PTA) Left 10/12/2016    Performed by [facility]afana MD at [facility] OR 2ND FLR    CARDIAC PACEMAKER PLACEMENT       SECTION      x2    DECLOT GRAFT PERCUTANEOUS Left 2017    Performed by Torrey [facility]ana MD at [facility] OR 2ND FLR    DECLOT-GRAFT Left 2016    Performed by Harjit Starr MD at [facility] CATH LAB    DECLOT-GRAFT Left 2016    Performed by Harjit Starr MD at [facility] CATH LAB    ECHOCARDIOGRAM-TRANSESOPHAGEAL N/A 2013    Performed by Delmy Surgeon at [facility] DELMY    fistulogram Left 1/10/2018    Performed by [facility] MD at [facility] OR  2ND FLR    FISTULOGRAM Left 9/13/2017    [facility] at [facility] CATH LAB    FISTULOGRAM Left 10/12/2016    Performed by Trorey Villafana MD at [facility] OR 2ND FLR    FISTULOGRAM Left 6/21/2016    Performed by Harjit Starr MD at [facility] CATH LAB    Fistulogram, LUE AVG, possible intervention Left 1/30/2019    Performed by [facility]ana MD at [facility] CATH LAB    INJECTION-STEROID-EPIDURAL-TRANSFORAMINAL Right 7/20/2015    Performed by [facility]rez [facility] at [facility] MGT    INJECTION-STEROID-EPIDURAL-TRANSFORAMINAL Right 5/21/2015    [facility]    STQUYIICG-LTGBW-ABCUJQCPQOFYJ / Left upper AV graft. Left 2/3/2016    Performed by Torery [facility]afana MD at [facility] OR 2ND FLR    SQTDGIJAS-QCIWTYGAW-TLPAHPAAJBKDE N/A 1/11/2017    Performed by Bladimir Adorno MD at [facility] CATH LAB    OTHER SURGICAL HISTORY      loop recorder implant    PLACEMENT-STENT Left 2/7/2017    Performed by Tor[facility]ana MD at [facility] OR 2ND FLR    PTA, AV FISTULA N/A 1/30/2019    Performed by [facility] MD at [facility] CATH LAB    stent to fistula      TRANSESOPHAGEAL ECHOCARDIOGRAM (JARAD) N/A 6/27/2016    Performed by Sourav Machuca MD at [facility] CATH LAB    ULTRASOUND, UPPER GI TRACT, ENDOSCOPIC N/A 1/9/2014    Performed by Stewart Burgess MD at [facility] ENDO (2ND FLR)    VASCULAR SURGERY      fistula to L upper arm            No results for input(s): HCT in the last 72 hours.  No results for input(s): PLT in the last 72 hours.  No results for input(s): K in the last 72 hours.  No results for input(s): CREATININE in the last 72 hours.  No results for input(s): GLU in the last 72 hours.  No results for input(s): PT in the last 72 hours.                    Anesthesia Evaluation         Review of Systems  Anesthesia Hx:  No problems with previous Anesthesia    Hematology/Oncology:     Oncology Normal   Hematology Comments: On plavix for grafts, Last took this am.     Cardiovascular:   Denies MI.    Denies Angina. Says she is on midodrine for low blood pressure.since she has been on dialysis for the last year.   Pulmonary:   Pneumonia (admitted recently for pneumonia, given antibiots and feels better now.) Sarcoid on routine steroids. Can walks half a block to a block.  On constant oxygen.   Renal/:   Chronic Renal Disease, ESRD T,t,sa,  Dialysis, last dialysis yesterday.  Left a little below dry weight.  Feels in good fluid balance.     Hepatic/GI:   Denies Liver Disease.    Neurological:   Denies TIA. Denies CVA. Seizures, well controlled    Endocrine:   Diabetes, type 2        Physical Exam  General:  Well nourished, Morbid Obesity    Airway/Jaw/Neck:  Airway Findings: Mouth Opening: Normal Tongue: Normal  General Airway Assessment: Adult, Average  Mallampati: II  TM Distance: Normal, at least 6 cm  Jaw/Neck Findings:  Neck ROM: Normal ROM            Mental Status:  Mental Status Findings:  Cooperative, Alert and Oriented         Anesthesia Plan  Type of Anesthesia, risks & benefits discussed:  Anesthesia Type:  general  Patient's Preference:   Intra-op Monitoring Plan:   Intra-op Monitoring Plan Comments:   Post Op Pain Control Plan:   Post Op Pain Control Plan Comments: As per surgeon's plan  Induction:   IV  Beta Blocker:         Informed Consent: Patient understands risks and agrees with Anesthesia plan.  Questions answered. Anesthesia consent signed with patient.  ASA Score: 3     Day of Surgery Review of History & Physical:    H&P update referred to the surgeon.         Ready For Surgery From Anesthesia Perspective.   · Moderate left ventricular enlargement.  · Moderately decreased left ventricular systolic function. The estimated ejection fraction is 35%  · Moderately reduced right ventricular systolic function.  · Left ventricular diastolic dysfunction.  · Severe biatrial enlargement.  · Mild-to-moderate mitral regurgitation.  · Mild tricuspid  regurgitation.  · Pulmonary hypertension present.  · The estimated PA systolic pressure is 56 mm Hg  Elevated central venous pressure (15 mm Hg).    Vitals - 1 value per visit 3/15/2019 3/26/2019 3/27/2019 3/28/2019   SYSTOLIC 136 113 126    DIASTOLIC 76 58 86    PULSE 72 72 69      Vitals - 1 value per visit 3/29/2019 3/31/2019 4/1/2019 4/3/2019 4/5/2019   SYSTOLIC   118 113 113   DIASTOLIC   80 81 72   PULSE   71 63 70     Vitals - 1 value per visit 4/5/2019   SYSTOLIC 109   DIASTOLIC 84   PULSE 70

## 2019-04-05 NOTE — H&P (VIEW-ONLY)
Vascular Surgery  History and Physical    Patient Name: Sisi Medel  YOB: 1960 (58 y.o.)  MRN: [id number]  Today's Date: 04/05/2019    Referring Md:   Torrey Villafana Md  [address] [place] [address]    SUBJECTIVE:     Chief Complaint: ESRD    History of Present Illness:  Ms. Sisi Medel is a 57 y/o  female with PMHx ESRD on HD, autonomic dysfunction, adrenal insufficiency, chronic systolic and diastolic heart failure (EF 35%),  pulmonary HTN, PAF, sarcoidosis, and recent hospitalization for PNA (03/2019) presenting today for fistulogram of her LUE AVG. History of stenosis of her AVG.    Last underwent fistulogram with PTA to AVG on 1/30/19 with Dr. Villafana.     She dialyzes on a MWF TTS schedule at [facility] under the care of Dr. Wilson.  She reports an EDW of 115 kg and a treatment time of 5 hours.   She has an AVG to her RAISSA.              Review of patient's allergies indicates:   Allergen Reactions    Bactrim [sulfamethoxazole-trimethoprim] Other (See Comments)     Causes renal failure    Nsaids (non-steroidal anti-inflammatory drug) Other (See Comments)     Renal failure       Past Medical History:   Diagnosis Date    Anemia in ESRD (end-stage renal disease) 3/7/2016    Anticoagulant long-term use     Cervical radiculopathy 7/20/2015    CHF (congestive heart failure)     Chronic combined systolic and diastolic heart failure 6/14/2013    EF 20% 2013, improved with Medical therapy, negative PET 2013    Chronic respiratory failure with hypoxia 04/22/2013    On home oxygen at 2-5 liters    Closed fracture of proximal end of right fibula 6/27/2016    Complications due to renal dialysis device, implant, and graft     DDD (degenerative disc disease), lumbar 6/17/2015    Dependence on renal dialysis     Diabetes mellitus type II, controlled 12/8/2017    ESRD on hemodialysis 2/23/2016    Essential hypertension     Gout     Lateral  meniscal tear 2016    Lumbar stenosis 2015    Obesity, Class III, BMI 40-49.9 (morbid obesity)     Pacemaker     Paroxysmal atrial fibrillation 2014    Not on anticoagulation    Peripheral neuropathy 2013    Personal history of gastric ulcer 3/19/2013    Pneumonia of left lower lobe due to infectious organism 3/10/2019    Sarcoidosis, lung 2009    Diagnosed in  with ocular manifestation, on 4L home O2 and PO steroids    Secondary pulmonary hypertension 2015    Seizure disorder 3/19/2013    Shingles 2013    Thyroid disease      Past Surgical History:   Procedure Laterality Date    ABDOMINAL SURGERY      ABLATION N/A 2017    [facility]formed [facility] at [facility] CATH LAB    ANGIOPLASTY Left 1/10/2018    Performed by Torrey Villafana MD at [facility] OR 2ND FLR    ANGIOPLASTY-PERCUTANEOUS TRANSLUMINAL (PTA) Left 2017    Performed by Torrey [facility] MD at [facility] OR 2ND FLR    ANGIOPLASTY-PERCUTANEOUS TRANSLUMINAL (PTA) Left 10/12/2016    Performed by Torrey [facility] MD at [facility] OR 2ND FLR    CARDIAC PACEMAKER PLACEMENT       SECTION      x2    DECLOT GRAFT PERCUTANEOUS Left 2017    Performed by Tor[facility] MD at [facility] OR 2ND FLR    DECLOT-GRAFT Left 2016    Performed by Harjit Starr MD at [facility] CATH LAB    DECLOT-GRAFT Left 2016    Performed by [facility]it [facility]arr MD at [facility] CATH LAB    ECHOCARDIOGRAM-TRANSESOPHAGEAL N/A 2013    Performed by Delmy Surgeon at [facility] DELMY    fistulogram Left 1/10/2018    Performed by Torrey [facility]ana MD at [facility] OR 2ND FLR    FISTULOGRAM Left 2017    Performed by Torrey Villafana MD at [facility] CATH LAB    FISTULOGRAM Left 10/12/2016    Performed by Torrey Villafana MD at [facility] OR 2ND FLR    FISTULOGRAM Left 2016    Performed by Harjit Starr MD at [facility] CATH LAB    Fistulogram, JAKE CASTREJONG, possible intervention Left 2019    Performed by Torrey Villafana  "MD at [facility] CATH LAB    INJECTION-STEROID-EPIDURAL-TRANSFORAMINAL Right 2015    [facility] PAIN MGT    INJECTION-STEROID-EPIDURAL-TRANSFORAMINAL Right 2015    [facility]    [id number]-CDOSI-MYYOGESVPDBHP / Left upper AV graft. Left 2/3/2016    Performed by Torrey [facility]afana MD at [facility] OR 2ND FLR    JFRXMZOPS-HZMRGUIQL-OOGBRXLGDYYKG N/A 2017    Performed by Bladimir Adorno MD at [facility] CATH LAB    OTHER SURGICAL HISTORY      loop recorder implant    PLACEMENT-STENT Left 2017    Performed by Torrey [facility]afana MD at [facility] OR 2ND FLR    PTA, AV FISTULA N/A 2019    Performed by Torrey Villafana MD at [facility] CATH LAB    stent to fistula      TRANSESOPHAGEAL ECHOCARDIOGRAM (JARAD) N/A 2016    Performed by Sourav Machuca MD at [facility] CATH LAB    ULTRASOUND, UPPER GI TRACT, ENDOSCOPIC N/A 2014    Performed by Stewart Burgess MD at [facility] ENDO (2ND FLR)    VASCULAR SURGERY      fistula to L upper arm      Family History   Problem Relation Age of Onset    Kidney failure Mother     Hypertension Mother     Diabetes Mother     Coronary artery disease Father     Hypertension Father     Diabetes Father     Lupus Sister     Diabetes Maternal Grandmother     Colon cancer Neg Hx     Ovarian cancer Neg Hx     Breast cancer Neg Hx      Social History     Tobacco Use    Smoking status: Former Smoker     Packs/day: 0.50     Years: 10.00     Pack years: 5.00     Types: Cigarettes     Last attempt to quit: 1994     Years since quittin.9    Smokeless tobacco: Never Used   Substance Use Topics    Alcohol use: No     Alcohol/week: 0.0 oz     Comment: rarely    Drug use: No            OBJECTIVE:     Vital Signs (Most Recent)  /72 (BP Location: Right arm, Patient Position: Lying)   Pulse 70   Temp 97.9 °F (36.6 °C) (Oral)   Resp 20   Ht 5' 9" (1.753 m)   Wt 118.8 kg (262 lb)   LMP  (LMP Unknown)   " SpO2 98% Comment: 4 l n/c  Breastfeeding? No   BMI 38.69 kg/m²     Physical Exam:  General: Black or  female in no distress   Neuro: alert and oriented x 4.  Moves all extremities.     HEENT: no icterus.  Trachea midline  Respiratory: respirations are even and unlabored  Cardiac: regular rate  Abdomen: soft, nontender, nondistended, obese  Extremities: Warm dry and intact. Well-healed scars to LUE proximal to antecubital fossa with AVG palpable underneath.   Skin: no rashes      ASSESSMENT/PLAN:     Patient is a 59yo female with ESRD and history of AVG stenosis.     - To OR today for fistulogram and PTA if necessary.   - Patient marked and consented.  - All questions answered.    Kathleen Dominguez MD  General Surgery Resident, PGY 1  Pager [phone]        STAFF ADDENDUM    Case cancelled due to emergency in the OR, will reschedule.     Torrey Villafana MD  Vascular & Endovascular Surgery

## 2019-04-05 NOTE — PROGRESS NOTES
Subjective:       Patient ID: Sisi Medel is a 58 y.o. female.    Chief Complaint: Wound Check    Patient is a 55 y.o.  female with a h/o HTN, HLD, CHF (EF 20%), COPD on home O2, paroxysmal Afib (on Coumadin), seizure disorder, ESRD on HD. The patient reports injuring her right lower leg a few months ago. She is using medihoney gel on the wounds and covering with a foam border dressing.  The wounds are  than on the previous visit. A wound culture on the last visit was positive for MRSA.  She completed a course of doxycycline without incident.  She does not have any new pustules today. She has home health now but does not know the name of the agency.  She will call us with this information. She is afebrile. She denies increased redness, swelling or purulent drainage.  She has delayed healing. She is not complaining of pain.    Review of Systems   Constitutional: Positive for fatigue. Negative for chills, diaphoresis and fever.   HENT: Negative for ear pain, nosebleeds, sinus pain, sore throat and trouble swallowing.    Eyes: Negative for pain and redness.   Respiratory: Negative for cough, shortness of breath and wheezing.    Cardiovascular: Positive for leg swelling. Negative for chest pain and palpitations.   Gastrointestinal: Negative for abdominal distention, abdominal pain, blood in stool, nausea and vomiting.   Endocrine: Negative for polydipsia, polyphagia and polyuria.   Genitourinary: Negative for flank pain and hematuria.   Musculoskeletal: Positive for arthralgias. Negative for back pain, joint swelling and myalgias.   Skin: Positive for rash and wound (Right lower leg).   Neurological: Positive for weakness. Negative for seizures, facial asymmetry and headaches.   Hematological: Negative for adenopathy. Bruises/bleeds easily.   Psychiatric/Behavioral: Negative for agitation, dysphoric mood and sleep disturbance. The patient is not nervous/anxious.        Objective:       Physical Exam   Constitutional: She is oriented to person, place, and time. Vital signs are normal. No distress.   Obese female   HENT:   Head: Normocephalic.   Eyes: Lids are normal.   Neck: Trachea normal. Carotid bruit is not present.   Cardiovascular: Intact distal pulses.   Pulses:       Dorsalis pedis pulses are 2+ on the right side, and 2+ on the left side.   Musculoskeletal: Normal range of motion.        Right lower leg: She exhibits edema.        Legs:  Neurological: She is alert and oriented to person, place, and time.   Skin: Skin is warm and dry. She is not diaphoretic. No erythema.   Psychiatric: She has a normal mood and affect. Thought content normal.   Vitals reviewed.      Lower Extremity US 12/30/2018: Ankle-brachial index of 1.28 on the right and 1.17 on the left.  No hemodynamically significant stenosis demonstrated in the right or left lower extremity arterial system. Bilateral calf arteries demonstrate monophasic waveforms which may suggest distal vascular disease.  Assessment:       1. Ulcer of right lower extremity with fat layer exposed    2. Folliculitis        Plan:       Apply skin prep to periwound area.   Apply medihoney gel daily to right lateral leg ulcers and cover with an aquacel foam border dressing three times weekly.  Skilled nurse visit-twice weekly.  Apply bactroban twice daily to any new pustules right lower leg.  Return to clinic in 4 weeks.

## 2019-04-05 NOTE — PLAN OF CARE
Due to an emergent case, we will be cancelling the patient's surgery today and rescheduling.  I spoke to the patient and she was in understanding.      Randy Robertson M.D.  General Surgery PGY3  [phone]

## 2019-04-05 NOTE — H&P
Vascular Surgery  History and Physical    Patient Name: Sisi Medel  YOB: 1960 (58 y.o.)  MRN: [id number]  Today's Date: 04/05/2019    Referring Md:   Torrey Villafana Md  [address] [place] [address]    SUBJECTIVE:     Chief Complaint: ESRD    History of Present Illness:  Ms. Sisi Medel is a 59 y/o  female with PMHx ESRD on HD, autonomic dysfunction, adrenal insufficiency, chronic systolic and diastolic heart failure (EF 35%),  pulmonary HTN, PAF, sarcoidosis, and recent hospitalization for PNA (03/2019) presenting today for fistulogram of her LUE AVG. History of stenosis of her AVG.    Last underwent fistulogram with PTA to AVG on 1/30/19 with Dr. Villafana.     She dialyzes on a MWF TTS schedule at [facility] under the care of Dr. Wilson.  She reports an EDW of 115 kg and a treatment time of 5 hours.   She has an AVG to her RAISSA.              Review of patient's allergies indicates:   Allergen Reactions    Bactrim [sulfamethoxazole-trimethoprim] Other (See Comments)     Causes renal failure    Nsaids (non-steroidal anti-inflammatory drug) Other (See Comments)     Renal failure       Past Medical History:   Diagnosis Date    Anemia in ESRD (end-stage renal disease) 3/7/2016    Anticoagulant long-term use     Cervical radiculopathy 7/20/2015    CHF (congestive heart failure)     Chronic combined systolic and diastolic heart failure 6/14/2013    EF 20% 2013, improved with Medical therapy, negative PET 2013    Chronic respiratory failure with hypoxia 04/22/2013    On home oxygen at 2-5 liters    Closed fracture of proximal end of right fibula 6/27/2016    Complications due to renal dialysis device, implant, and graft     DDD (degenerative disc disease), lumbar 6/17/2015    Dependence on renal dialysis     Diabetes mellitus type II, controlled 12/8/2017    ESRD on hemodialysis 2/23/2016    Essential hypertension     Gout     Lateral  meniscal tear 2016    Lumbar stenosis 2015    Obesity, Class III, BMI 40-49.9 (morbid obesity)     Pacemaker     Paroxysmal atrial fibrillation 2014    Not on anticoagulation    Peripheral neuropathy 2013    Personal history of gastric ulcer 3/19/2013    Pneumonia of left lower lobe due to infectious organism 3/10/2019    Sarcoidosis, lung 2009    Diagnosed in  with ocular manifestation, on 4L home O2 and PO steroids    Secondary pulmonary hypertension 2015    Seizure disorder 3/19/2013    Shingles 2013    Thyroid disease      Past Surgical History:   Procedure Laterality Date    ABDOMINAL SURGERY      ABLATION N/A 2017    Performed by [facility]orno MD at [facility] CATH LAB    ANGIOPLASTY Left 1/10/2018    Performed by Torrey Villafana MD at [facility] OR 2ND FLR    ANGIOPLASTY-PERCUTANEOUS TRANSLUMINAL (PTA) Left 2017    Performed by Torrey [facility]afana MD at [facility] OR 2ND FLR    ANGIOPLASTY-PERCUTANEOUS TRANSLUMINAL (PTA) Left 10/12/2016    Performed by Torrey [facility]afana MD at [facility] OR 2ND FLR    CARDIAC PACEMAKER PLACEMENT       SECTION      x2    DECLOT GRAFT PERCUTANEOUS Left 2017    Performed by Torrey [facility]afana MD at [facility] OR 2ND FLR    DECLOT-GRAFT Left 2016    Performed by Harjit Starr MD at [facility] CATH LAB    DECLOT-GRAFT Left 2016    Performed by Harjit Starr MD at [facility] CATH LAB    ECHOCARDIOGRAM-TRANSESOPHAGEAL N/A 2013    Performed by Delmy Surgeon at [facility] DELMY    fistulogram Left 1/10/2018    Performed by Torrey [facility]afana MD at [facility] OR 2ND FLR    FISTULOGRAM Left 2017    Performed by Torrey [facility]afana MD at [facility] CATH LAB    FISTULOGRAM Left 10/12/2016    Performed by Torrey [facility]afana MD at [facility] OR 2ND FLR    FISTULOGRAM Left 2016    Performed by [facility]jit Starr MD at [facility] CATH LAB    Fistulogram, JAKE CASTREJONG, possible intervention Left 2019    Performed by Torrey Villafana  "MD at [facility] CATH LAB    INJECTION-STEROID-EPIDURAL-TRANSFORAMINAL Right 2015    [facility] PAIN MGT    INJECTION-STEROID-EPIDURAL-TRANSFORAMINAL Right 2015    [facility]    [id number] / Left upper AV graft. Left 2/3/2016    Performed by Tor[facility] MD at [facility] OR 2ND FLR    YVJAWQPLK-NYBSJUVBC-ESGYFBQLLAMFO N/A 2017    Performed by Bladimir Adorno MD at [facility] CATH LAB    OTHER SURGICAL HISTORY      loop recorder implant    PLACEMENT-STENT Left 2017    Performed by Tor[facility] MD at [facility] OR 2ND FLR    PTA, AV FISTULA N/A 2019    Performed by Tor[facility] MD at [facility] CATH LAB    stent to fistula      TRANSESOPHAGEAL ECHOCARDIOGRAM (JARAD) N/A 2016    Performed by Sourav Machuca MD at [facility] CATH LAB    ULTRASOUND, UPPER GI TRACT, ENDOSCOPIC N/A 2014    Performed by Stewart Burgess MD at [facility] ENDO (2ND FLR)    VASCULAR SURGERY      fistula to L upper arm      Family History   Problem Relation Age of Onset    Kidney failure Mother     Hypertension Mother     Diabetes Mother     Coronary artery disease Father     Hypertension Father     Diabetes Father     Lupus Sister     Diabetes Maternal Grandmother     Colon cancer Neg Hx     Ovarian cancer Neg Hx     Breast cancer Neg Hx      Social History     Tobacco Use    Smoking status: Former Smoker     Packs/day: 0.50     Years: 10.00     Pack years: 5.00     Types: Cigarettes     Last attempt to quit: 1994     Years since quittin.9    Smokeless tobacco: Never Used   Substance Use Topics    Alcohol use: No     Alcohol/week: 0.0 oz     Comment: rarely    Drug use: No            OBJECTIVE:     Vital Signs (Most Recent)  /72 (BP Location: Right arm, Patient Position: Lying)   Pulse 70   Temp 97.9 °F (36.6 °C) (Oral)   Resp 20   Ht 5' 9" (1.753 m)   Wt 118.8 kg (262 lb)   LMP  (LMP Unknown)   " SpO2 98% Comment: 4 l n/c  Breastfeeding? No   BMI 38.69 kg/m²     Physical Exam:  General: Black or  female in no distress   Neuro: alert and oriented x 4.  Moves all extremities.     HEENT: no icterus.  Trachea midline  Respiratory: respirations are even and unlabored  Cardiac: regular rate  Abdomen: soft, nontender, nondistended, obese  Extremities: Warm dry and intact. Well-healed scars to LUE proximal to antecubital fossa with AVG palpable underneath.   Skin: no rashes      ASSESSMENT/PLAN:     Patient is a 57yo female with ESRD and history of AVG stenosis.     - To OR today for fistulogram and PTA if necessary.   - Patient marked and consented.  - All questions answered.    Kathleen Dominguez MD  General Surgery Resident, PGY 1  Pager [phone]        STAFF ADDENDUM    Case cancelled due to emergency in the OR, will reschedule.     Torrey Villafana MD  Vascular & Endovascular Surgery

## 2019-04-08 NOTE — NURSING
Received via stretcher from cath lab.  Report from DEXTER Wylie.  Awake and alert.  No c/o pain.  Dressing gauze/tegaderm to left upper arm clean dry and intact.  Positive thrill and bruit to left upper arm.  VSS.  Will continue to monitor.  Tolerating fluids and sandwich on arrival.

## 2019-04-08 NOTE — INTERVAL H&P NOTE
The patient has been examined and the H&P has been reviewed:    I concur with the findings and no changes have occurred since H&P was written.    Anesthesia/Surgery risks, benefits and alternative options discussed and understood by patient/family.          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

## 2019-04-08 NOTE — NURSING
IV d/c'd with cath tip intact.  Dressing to left upper arm remains clean dry and intact.  VSS.  Tolerated sandwich and fluids.  Pt and pt fiance verbalized understanding of d/c instructions.

## 2019-04-09 NOTE — OP NOTE
DATE OF PROCEDURE:  04/08/2019.    SURGEON:  Torrey Villafana M.D.    ASSISTANT:  None.    PREOPERATIVE DIAGNOSIS:  AV graft stenosis, subsequent encounter.    POSTOPERATIVE DIAGNOSIS:  AV graft stenosis, subsequent encounter.    PROCEDURES:  1.  Left upper extremity AV graft PTA, 8 x 40 Larue balloon.  2.  Left upper extremity AV graft PTA, 10 x 40 Larue balloon.  3.  Left upper extremity fistulogram.  4.  Conscious sedation 45 minutes.    ANESTHESIA:  MAC plus local.    COMPLICATIONS:  None.    SPECIMENS:  None.    ESTIMATED BLOOD LOSS:  Minimal.    INDICATIONS:  The patient is a 58-year-old female, well known to me after having   a left upper extremity graft placed approximately 3 years ago.  She required   multiple interventions to maintain graft patency, but has been dialyzing well.    I noticed on a recent ultrasound of the graft that the flow volume was down and   a recurrent outflow stenosis was noted.  For this reason, an intervention was   planned.    DESCRIPTION OF PROCEDURE:  The patient was identified as Sisi Medel in the   preop holding area and brought to the Catheterization Lab.  She was again   identified, positioned supine on the table with a standard sterile prep of the   left upper extremity completed.  After time team wide timeout during which the   procedure and laterality were agreed upon by all operating room staff, a   subcutaneous wheal of 1% lidocaine was raised over the proximal inflow aspect of   the graft.  A micropuncture needle was used to access the graft and a   micropuncture wire was advanced under fluoroscopic guidance.  The needle was   exchanged for a micropuncture sheath, which was used to complete the left upper   extremity fistulogram clearly delineating 2 distinct outflow stenoses.  An   intervention was then planned.    A 6-Zimbabwean working sheath was placed under fluoroscopic guidance and a   stiff-angled Glidewire was maneuvered under fluoroscopic guidance into the    central venous circulation.  An 8 x 40 Broome balloon was brought into the field   and using roadmap imaging for the previous fistulogram and in-stent restenosis   was dilated to a pressure of 18 atmospheres.  This was completed over 2 minutes.    A followup fistulogram demonstrated resolution of that stenosis, however, an   outflow venous stenosis.  For this reason, a 10 x 40 Broome balloon was brought   into the field and positioned to straddle the stenosis.  It was then inflated to   18 atmospheres and left for 2 minutes.  A followup fistulogram after deflation   of the balloon demonstrated resolution of the stenosis.  A central venous   circulation was noted to be patent.    At this point, wires and catheters were removed and a 3-0 Monocryl suture was   used to close the defect when the sheath was removed.  Hemostasis was noted.    Torrey NICE, was present for the entirety of the operation including 45   minutes of conscious sedation time.  Please see the nursing record for dosages   of medications.      KARIE/RENUKA  dd: 04/09/2019 09:21:00 (CDT)  td: 04/09/2019 11:45:34 (CDT)  Doc ID   #[id number]  Job ID #[id number]    CC:

## 2019-04-09 NOTE — TELEPHONE ENCOUNTER
----- Message from Pedro Luis Woodson sent at 4/9/2019 10:54 AM CDT -----  Contact: [phone]  Type: Home Health (orders, updates, clarifications, etc.)    Home Health Agency/Nurse: Brigitte     Phone number: [phone] ext [phone]    Reason for call: calling to see if Md will sign wound care     Comments: please call and advise, thanks !

## 2019-04-09 NOTE — TELEPHONE ENCOUNTER
HH nurse from [facility] need clarification on wound care orders - orders stated to use medihoney gel daily to right lateral ulcers and cover with an aquacel foam border dressing three times weekly - advised HH nurse that daily use of medihoney was an error since aquacel form border dressing is being used.  Instructed to fax over verbal order to be signed by Lilian and will fax back.

## 2019-04-09 NOTE — TELEPHONE ENCOUNTER
----- Message from SUSAN Moise sent at 4/8/2019  4:04 PM CDT -----  Contact: Brigitte with [facility]#[phone] ext [phone]  This is pt of Lilian , maybe you can help them   ----- Message -----  From: Aleyda Lopez  Sent: 4/8/2019   2:56 PM  To: SUSAN oMise    Needs Advice    Reason for call:toney on home health order for wound care         Communication Preference:call  Additional Information:

## 2019-04-09 NOTE — BRIEF OP NOTE
Brief Operative Note  Date: 4/8/2019    Surgeon(s) and Role:     * Torrey Villafana MD - Primary    Pre-op Diagnosis:  Arteriovenous graft stenosis, subsequent encounter [T82.564D];     Post-op Diagnosis:  Same    Procedure(s):  1) LUE AVG PTA 8 x 40 [place]  2) LUE AVG PTA 10 x 40 [place]  3) LUE fistulagram  4) conscious sedation 45 min    Surgeon: Torrey Villafana MD  Vascular & Endovascular Surgery       Anesthesia: MAC + local    Findings/Key Components:  Successful treatment of recurrent outflow stenoses    EBL: Minimal         Specimens (From admission, onward)    None          I attest to being present for the procedure and performing the case.  Torrey Villafana MD  Vascular & Endovascular Surgery     Discharge Note  SUMMARY    Admit Date: 4/8/2019    Attending Physician: Torrey Villafana MD  Vascular & Endovascular Surgery     Discharge Physician: Torrey Villafana MD  Vascular & Endovascular Surgery       Discharge Date: 04/09/2019    Final Diagnosis: Arteriovenous fistula stenosis, subsequent encounter [T82.467D]    Disposition: Home or self-care    Patient Instructions:   Discharge Medication List as of 4/8/2019  3:42 PM      CONTINUE these medications which have NOT CHANGED    Details   aspirin (ECOTRIN) 81 MG EC tablet Take 81 mg by mouth once daily., Historical Med      atorvastatin (LIPITOR) 80 MG tablet TAKE 1 TABLET BY MOUTH EVERY EVENING, Normal      blood sugar diagnostic Strp 1 each by Misc.(Non-Drug; Combo Route) route once daily., Starting Thu 12/6/2018, Print      cinacalcet (SENSIPAR) 30 MG Tab Take 30 mg by mouth daily with breakfast., Historical Med      clopidogrel (PLAVIX) 75 mg tablet TAKE 1 TABLET(75 MG) BY MOUTH EVERY DAY, Normal      fludrocortisone (FLORINEF) 0.1 mg Tab Take 1 tablet (100 mcg total) by mouth 2 (two) times daily., Starting Mon 4/1/2019, Normal      gabapentin (NEURONTIN) 600 MG tablet Take 600 mg by mouth 2 (two) times daily. , Historical Med      lancets Misc 1  each by Misc.(Non-Drug; Combo Route) route once daily., Starting Thu 12/6/2018, Print      levETIRAcetam (KEPPRA) 500 MG Tab TAKE 1 TABLET BY MOUTH TWICE DAILY, Normal      midodrine (PROAMATINE) 10 MG tablet Take 1 tablet (10 mg total) by mouth 3 (three) times daily., Starting Mon 4/1/2019, Until Tue 3/31/2020, Normal      nortriptyline (PAMELOR) 25 MG capsule Take 1 capsule by mouth nightly, Historical Med      omeprazole (PRILOSEC) 20 MG capsule TAKE 1 CAPSULE BY MOUTH EVERY DAY, Normal      patiromer calcium sorbitex (VELTASSA) 16.8 gram PwPk Take 33.6 g by mouth once daily. , Historical Med      prednisoLONE acetate (PRED FORTE) 1 % DrpS INSTILL ONE DROP INTO  LEFT EYE THREE TIMES A DAY, Historical Med      predniSONE (DELTASONE) 20 MG tablet Take 0.5 tablets (10 mg total) by mouth once daily., Starting Mon 4/1/2019, Normal      pregabalin (LYRICA) 100 MG capsule Take 1 capsule (100 mg total) by mouth 3 (three) times daily., Starting Wed 2/20/2019, Until Wed 8/21/2019, Normal      DENISE-LINDA 0.8 mg Tab TAKE 1 TABLET BY MOUTH ONCE DAILY, Historical Med      RENVELA 800 mg Tab TAKE 1 TABLET BY MOUTH THREE TIMES DAILY WITH MEALS, Normal      tiZANidine (ZANAFLEX) 4 MG tablet TAKE 1 TABLET BY MOUTH EVERY DAY AS NEEDED FOR MUSCLE SPASMS, Normal      TRADJENTA 5 mg Tab tablet TAKE 1 TABLET(5 MG) BY MOUTH EVERY DAY, Normal      warfarin (COUMADIN) 5 MG tablet TAKE 1 TABLET BY MOUTH EVERY DAY EXCEPT TH 1AND 1/2 TABLETS ON MONDAY AND FRIDAY OR AS DIRECTED, Normal             Diet:  Resume pre-operative diet    Activity:  Ad rosa    Follow-up:  Follow-up in clinic with Dr Villafana within 1-2 weeks; please call clinic nurse at

## 2019-04-10 PROBLEM — L97.521 SKIN ULCER OF TOE OF LEFT FOOT, LIMITED TO BREAKDOWN OF SKIN: Status: ACTIVE | Noted: 2019-01-01

## 2019-04-10 NOTE — PROGRESS NOTES
INR at goal. Medications and chart reviewed. No changes noted to necessitate adjustment of warfarin or follow-up plan. See calendar.

## 2019-04-10 NOTE — TELEPHONE ENCOUNTER
Spoke with Brigitte, patient is seeing NP Chastity who does not work with any specific doctor and will need us to sign would care orders. I gave Brigitte our direct fax number and will bring orders to you when I receive them.

## 2019-04-10 NOTE — ED PROVIDER NOTES
Encounter Date: 4/10/2019       History     Chief Complaint   Patient presents with    Hyperkalemia     ny heart  sent me her my potasium over 7     57 y/o AAF with history of ESRD on HD TTS, CHF, HTN, paroxysmal Afib, sarcoidosis, DM2 presents to the ED c/o abnormal lab. She had appointments today to see heart failure clinic - had labs prior to appt and was instructed to present to the ED for hyperkalemia. She had dialysis yesterday without any complication. She does to [facility] on [address] at 6am. She reports SOB (is on home O2), headache improved with tylenol, dry cough, cramping in legs. She denies f/c, palpitations, chest pain, abdominal pain, nausea, diarrhea.     The history is provided by the patient.     Review of patient's allergies indicates:   Allergen Reactions    Bactrim [sulfamethoxazole-trimethoprim] Other (See Comments)     Causes renal failure    Nsaids (non-steroidal anti-inflammatory drug) Other (See Comments)     Renal failure     Past Medical History:   Diagnosis Date    Anemia in ESRD (end-stage renal disease) 3/7/2016    Anticoagulant long-term use     Cervical radiculopathy 7/20/2015    CHF (congestive heart failure)     Chronic combined systolic and diastolic heart failure 6/14/2013    EF 20% 2013, improved with Medical therapy, negative PET 2013    Chronic respiratory failure with hypoxia 04/22/2013    On home oxygen at 2-5 liters    Closed fracture of proximal end of right fibula 6/27/2016    Complications due to renal dialysis device, implant, and graft     DDD (degenerative disc disease), lumbar 6/17/2015    Dependence on renal dialysis     Diabetes mellitus type II, controlled 12/8/2017    ESRD on hemodialysis 2/23/2016    Essential hypertension     Gout     Lateral meniscal tear 5/31/2016    Lumbar stenosis 6/17/2015    Obesity, Class III, BMI 40-49.9 (morbid obesity)     Pacemaker     Paroxysmal atrial fibrillation 5/16/2014    Not on anticoagulation     Peripheral neuropathy 2013    Personal history of gastric ulcer 3/19/2013    Pneumonia of left lower lobe due to infectious organism 3/10/2019    Sarcoidosis, lung 2009    Diagnosed in  with ocular manifestation, on 4L home O2 and PO steroids    Secondary pulmonary hypertension 2015    Seizure disorder 3/19/2013    Shingles 2013    Thyroid disease      Past Surgical History:   Procedure Laterality Date    ABDOMINAL SURGERY      ABLATION N/A 2017    Performed by Bladimir Adorno MD at [facility] CATH LAB    ANGIOPLASTY Left 1/10/2018    Performed by Torrey Villafana MD at [facility] OR 2ND FLR    ANGIOPLASTY-PERCUTANEOUS TRANSLUMINAL (PTA) Left 2017    [facility]    ANGIOPLASTY-PERCUTANEOUS TRANSLUMINAL (PTA) Left 10/12/2016    Performed by Torrey Villafana MD at [facility] OR [address] [place] [address]    CARDIAC PACEMAKER PLACEMENT       SECTION      x2    DECLOT GRAFT PERCUTANEOUS Left 2017    Performed by Torrey Villafana MD at [facility] OR 2ND FLR    DECLOT-GRAFT Left 2016    Performed by Harjit Starr MD at [facility] CATH LAB    DECLOT-GRAFT Left 2016    Performed by Harjit Starr MD at [facility] CATH LAB    ECHOCARDIOGRAM-TRANSESOPHAGEAL N/A 2013    Performed by Delmy Surgeon at [facility] DELMY    Fistulogram Left 2019    Performed by Tor[facility]afana MD at [facility] CATH LAB    fistulogram Left 1/10/2018    Performed by Torrey Villafana MD at [facility] OR 2ND FLR    FISTULOGRAM Left 2017    Performed by Torrey Villafana MD at [facility] CATH LAB    FISTULOGRAM Left 10/12/2016    Performed by Torrey Villafana MD at [facility] OR 2ND FLR    FISTULOGRAM Left 2016    Performed by Harjit Starr MD at [facility] CATH LAB    Fistulogram, JAKE AVG, possible intervention Left 2019    Performed by Torrey Villafana MD at [facility] CATH LAB    INJECTION-STEROID-EPIDURAL-TRANSFORAMINAL Right 2015    Performed by Patria Gutierrez,  MD at [facility] PAIN MGT    INJECTION-STEROID-EPIDURAL-TRANSFORAMINAL Right 2015    [facility]    SGRAGPKQH-XJVYH-XATCFJDQTHUMR / Left upper AV graft. Left 2/3/2016    Performed by [facility] MD at [facility] OR 2ND FLR    DYGGDDWPT-CEOCVFGYV-DMVNBUKWEFRXS N/A 2017    Performed by Bladimir Adorno MD at [facility] CATH LAB    OTHER SURGICAL HISTORY      loop recorder implant    PLACEMENT-STENT Left 2017    Performed by Torrey [facility]afana MD at [facility] OR 2ND FLR    PTA, AV FISTULA N/A 2019    Performed by Torrey Villafana MD at [facility] CATH LAB    stent to fistula      TRANSESOPHAGEAL ECHOCARDIOGRAM (JARAD) N/A 2016    Performed by Sourav Machuca MD at [facility] CATH LAB    ULTRASOUND, UPPER GI TRACT, ENDOSCOPIC N/A 2014    Performed by Stewart Burgess MD at [facility] ENDO (2ND FLR)    VASCULAR SURGERY      fistula to L upper arm      Family History   Problem Relation Age of Onset    Kidney failure Mother     Hypertension Mother     Diabetes Mother     Coronary artery disease Father     Hypertension Father     Diabetes Father     Lupus Sister     Diabetes Maternal Grandmother     Colon cancer Neg Hx     Ovarian cancer Neg Hx     Breast cancer Neg Hx      Social History     Tobacco Use    Smoking status: Former Smoker     Packs/day: 0.50     Years: 10.00     Pack years: 5.00     Types: Cigarettes     Last attempt to quit: 1994     Years since quittin.9    Smokeless tobacco: Never Used   Substance Use Topics    Alcohol use: No     Alcohol/week: 0.0 oz     Comment: rarely    Drug use: No     Review of Systems   Constitutional: Negative for chills and fever.   HENT: Negative for congestion, rhinorrhea and sore throat.    Eyes: Negative for photophobia and visual disturbance.   Respiratory: Positive for shortness of breath (on home O2). Negative for cough.    Cardiovascular: Negative for chest pain and palpitations.   Gastrointestinal:  Negative for abdominal pain, constipation, diarrhea, nausea and vomiting.   Genitourinary:        Anuric, on dialysis   Musculoskeletal: Positive for myalgias.   Skin: Negative for rash and wound.   Neurological: Positive for headaches (improved with po tylenol). Negative for light-headedness and numbness.   Psychiatric/Behavioral: Negative for confusion.       Physical Exam     Initial Vitals [04/10/19 1131]   BP Pulse Resp Temp SpO2   117/71 70 18 98.8 °F (37.1 °C) (!) 93 %      MAP       --         Physical Exam    Nursing note and vitals reviewed.  Constitutional: She appears well-developed and well-nourished. She is not diaphoretic. No distress.   HENT:   Head: Normocephalic and atraumatic.   Neck: Normal range of motion. Neck supple.   Cardiovascular: Normal rate, regular rhythm and normal heart sounds. Exam reveals no gallop and no friction rub.    No murmur heard.  LE edema noted   Pulmonary/Chest: Breath sounds normal. She has no wheezes. She has no rhonchi. She has no rales.   Abdominal: Soft. Bowel sounds are normal. There is no tenderness. There is no rebound and no guarding.   Musculoskeletal: Normal range of motion.   AVF noted to the LUE with +thrill   Neurological: She is alert and oriented to person, place, and time.   Skin: Skin is warm and dry. No rash noted. No erythema.   Psychiatric: She has a normal mood and affect.         ED Course   Procedures  Labs Reviewed   BASIC METABOLIC PANEL - Abnormal; Notable for the following components:       Result Value    Potassium 5.8 (*)     Glucose 164 (*)     Creatinine 5.8 (*)     Calcium 8.1 (*)     eGFR if  8.6 (*)     eGFR if non  7.5 (*)     All other components within normal limits   POCT GLUCOSE - Abnormal; Notable for the following components:    POCT Glucose 200 (*)     All other components within normal limits   POCT GLUCOSE - Abnormal; Notable for the following components:    POCT Glucose 273 (*)     All other  components within normal limits   ISTAT PROCEDURE - Abnormal; Notable for the following components:    POC Glucose 209 (*)     POC Creatinine 5.5 (*)     POC Ionized Calcium 0.84 (*)     All other components within normal limits   ISTAT CHEM8        ECG Results          EKG 12-lead (Final result)  Result time 04/10/19 14:17:06    Final result by Interface, Lab In [facility] (04/10/19 14:17:06)                 Narrative:    Test Reason : R07.9    Vent. Rate : 070 BPM     Atrial Rate : 066 BPM     P-R Int : 000 ms          QRS Dur : 164 ms      QT Int : 496 ms       P-R-T Axes : 000 210 082 degrees     QTc Int : 535 ms    Electronic ventricular pacemaker  Atrial fibrillation  Abnormal ECG  When compared with ECG of 28-MAR-2019 15:29,  No significant change was found    Confirmed by HANNY SPENCER MD ([id number]) on 4/10/2019 2:16:58 PM    Referred By: [facility]           Confirmed By:HANNY SPENCER MD                            Imaging Results    None          Medical Decision Making:   History:   Old Medical Records: I decided to obtain old medical records.  Old Records Summarized: records from clinic visits.       <> Summary of Records: Labs from earlier today reviewed - K 6.8  Clinical Tests:   Lab Tests: Reviewed and Ordered  Medical Tests: Ordered and Reviewed  Other:   I have discussed this case with another health care provider.       <> Summary of the Discussion: Nephrology       APC / Resident Notes:   59 y/o AAF with history of ESRD on HD TTS, CHF, HTN, paroxysmal Afib, sarcoidosis, DM2 presents to the ED c/o abnormal lab. VSS. Abdomen soft. No acute distress. Labs from earlier today reviewed - K 6.8. Will repeat BMP.     EKG shows paced rhythm.     Repeat BMP shows K 5.8.     Discussed with nephrology staff on call - recommend shifting in the ED. If K returns to normal - discharge home for dialysis tomorrow morning.     Given albuterol, insulin/dextrose, kayexalate.     4:10 PM  Repeat K 4.5. Stable for  discharge.    She was discharged without any new prescriptions.  She will follow up with her nephrologist - will have dialysis tomorrow morning as scheduled. Given strict ED return precautions.  All of the patient's questions were answered.  I reviewed the patient's chart and labs and discussed the case with my supervising physician.                    Clinical Impression:       ICD-10-CM ICD-9-CM   1. Hyperkalemia E87.5 276.7         Disposition:   Disposition: Discharged  Condition: Stable                        Francoise Jose PA-C  04/10/19 1829

## 2019-04-10 NOTE — TELEPHONE ENCOUNTER
@2510 Received call from [facility] at lab reporting a critical of potassium of 6.8 with 1+ hemolysis. Informed Dr. Alfaro.  Contacted pt. Pt reports feeling fine at this time. Denies any cp or discomfort. Instructed pt per Dr Alfaro to go to ED to have lab repeated and to be monitored while labs is being repeated. Will reschedule CHF appt. Pt verbalized understanding.

## 2019-04-10 NOTE — ED TRIAGE NOTES
Pt sent to the ED via Cardiologist advised K of 7. Pt had lab work drawn this morning. Pt reports SOB that is chronic. She denies any chest pain.     Patient identifiers verified and correct for Ssii Medel.   LOC: The patient is awake, alert and aware of environment with an appropriate affect, the patient is oriented x 3 and speaking appropriately.   APPEARANCE: Patient appears comfortable and in no acute distress, patient is clean and well groomed.  SKIN: The skin is warm and dry, color consistent with ethnicity, patient has normal skin turgor and moist mucus membranes. Wounds to bilateral lower extremity.   MUSCULOSKELETAL: Patient moving all extremities spontaneously, no swelling noted.  RESPIRATORY: Airway is open and patent, respirations are spontaneous, patient has a normal effort and rate, no accessory muscle use noted, pt placed on continuous pulse ox with O2 sats noted at 98% on 4L.   CARDIAC: Pt placed on cardiac monitor. Patient has a normal rate and regular rhythm, no edema noted, capillary refill < 3 seconds.   GASTRO: Soft and non tender to palpation, no distention noted. Pt reports chronic constipation but had a BM this morning.   : Pt does not urinate.  NEURO: Pt opens eyes spontaneously, behavior appropriate to situation, follows commands, facial expression symmetrical, bilateral hand grasp equal and even, purposeful motor response noted, normal sensation in all extremities when touched with a finger.

## 2019-04-10 NOTE — PROGRESS NOTES
Subjective:      Patient ID: Sisi Medel is a 58 y.o. female.    Chief Complaint: Diabetes Mellitus (03/01/2019 dr jill eduardo) and Diabetic Foot Exam    Pt here today for multiple wounds on lower extremities. Denies any NVFCSOB chest or calf pain. Pt has a nurse changing dressings 3 times a week.     Past Medical History:   Diagnosis Date    Anemia in ESRD (end-stage renal disease) 3/7/2016    Anticoagulant long-term use     Cervical radiculopathy 7/20/2015    CHF (congestive heart failure)     Chronic combined systolic and diastolic heart failure 6/14/2013    EF 20% 2013, improved with Medical therapy, negative PET 2013    Chronic respiratory failure with hypoxia 04/22/2013    On home oxygen at 2-5 liters    Closed fracture of proximal end of right fibula 6/27/2016    Complications due to renal dialysis device, implant, and graft     DDD (degenerative disc disease), lumbar 6/17/2015    Dependence on renal dialysis     Diabetes mellitus type II, controlled 12/8/2017    ESRD on hemodialysis 2/23/2016    Essential hypertension     Gout     Lateral meniscal tear 5/31/2016    Lumbar stenosis 6/17/2015    Obesity, Class III, BMI 40-49.9 (morbid obesity)     Pacemaker     Paroxysmal atrial fibrillation 5/16/2014    Not on anticoagulation    Peripheral neuropathy 11/13/2013    Personal history of gastric ulcer 3/19/2013    Pneumonia of left lower lobe due to infectious organism 3/10/2019    Sarcoidosis, lung 2009    Diagnosed in 2009 with ocular manifestation, on 4L home O2 and PO steroids    Secondary pulmonary hypertension 4/7/2015    Seizure disorder 3/19/2013    Shingles 11/13/2013    Thyroid disease            Current Outpatient Medications on File Prior to Visit   Medication Sig Dispense Refill    aspirin (ECOTRIN) 81 MG EC tablet Take 81 mg by mouth once daily.      atorvastatin (LIPITOR) 80 MG tablet TAKE 1 TABLET BY MOUTH EVERY EVENING 90 tablet 1    blood sugar  diagnostic Strp 1 each by Misc.(Non-Drug; Combo Route) route once daily. (Patient taking differently: 1 each by Misc.(Non-Drug; Combo Route) route 2 (two) times daily. ) 100 each 3    cinacalcet (SENSIPAR) 30 MG Tab Take 30 mg by mouth daily with breakfast.      clopidogrel (PLAVIX) 75 mg tablet TAKE 1 TABLET(75 MG) BY MOUTH EVERY DAY 90 tablet 0    fludrocortisone (FLORINEF) 0.1 mg Tab Take 1 tablet (100 mcg total) by mouth 2 (two) times daily. 60 tablet 5    gabapentin (NEURONTIN) 600 MG tablet Take 600 mg by mouth 2 (two) times daily.       lancets Misc 1 each by Misc.(Non-Drug; Combo Route) route once daily. (Patient taking differently: 1 each by Misc.(Non-Drug; Combo Route) route 2 (two) times daily. ) 100 each 3    levETIRAcetam (KEPPRA) 500 MG Tab TAKE 1 TABLET BY MOUTH TWICE DAILY 180 tablet 3    midodrine (PROAMATINE) 10 MG tablet Take 1 tablet (10 mg total) by mouth 3 (three) times daily. 90 tablet 11    nortriptyline (PAMELOR) 25 MG capsule Take 1 capsule by mouth nightly  1    omeprazole (PRILOSEC) 20 MG capsule TAKE 1 CAPSULE BY MOUTH EVERY DAY 90 capsule 4    patiromer calcium sorbitex (VELTASSA) 16.8 gram PwPk Take 33.6 g by mouth once daily.       prednisoLONE acetate (PRED FORTE) 1 % DrpS INSTILL ONE DROP INTO  LEFT EYE THREE TIMES A DAY  3    predniSONE (DELTASONE) 20 MG tablet Take 0.5 tablets (10 mg total) by mouth once daily. 30 tablet 2    pregabalin (LYRICA) 100 MG capsule Take 1 capsule (100 mg total) by mouth 3 (three) times daily. 270 capsule 1    DENISE-LINDA 0.8 mg Tab TAKE 1 TABLET BY MOUTH ONCE DAILY  0    RENVELA 800 mg Tab TAKE 1 TABLET BY MOUTH THREE TIMES DAILY WITH MEALS 90 tablet 0    tiZANidine (ZANAFLEX) 4 MG tablet TAKE 1 TABLET BY MOUTH EVERY DAY AS NEEDED FOR MUSCLE SPASMS (Patient taking differently: TAKE 1 TABLET BY MOUTH EVERY NIGHT  AS NEEDED FOR MUSCLE SPASMS) 90 tablet 0    TRADJENTA 5 mg Tab tablet TAKE 1 TABLET(5 MG) BY MOUTH EVERY DAY 90 tablet 0     warfarin (COUMADIN) 5 MG tablet TAKE 1 TABLET BY MOUTH EVERY DAY EXCEPT TH 1AND 1/2 TABLETS ON MONDAY AND FRIDAY OR AS DIRECTED (Patient taking differently: TAKE 1 TABLET BY MOUTH EVERY DAY OR AS DIRECTED) 120 tablet 0     No current facility-administered medications on file prior to visit.            Review of patient's allergies indicates:   Allergen Reactions    Bactrim [sulfamethoxazole-trimethoprim] Other (See Comments)     Causes renal failure    Nsaids (non-steroidal anti-inflammatory drug) Other (See Comments)     Renal failure           Social History     Socioeconomic History    Marital status: Single     Spouse name: Not on file    Number of children: Not on file    Years of education: Not on file    Highest education level: Not on file   Occupational History    Not on file   Social Needs    Financial resource strain: Not on file    Food insecurity:     Worry: Not on file     Inability: Not on file    Transportation needs:     Medical: Not on file     Non-medical: Not on file   Tobacco Use    Smoking status: Former Smoker     Packs/day: 0.50     Years: 10.00     Pack years: 5.00     Types: Cigarettes     Last attempt to quit: 1994     Years since quittin.9    Smokeless tobacco: Never Used   Substance and Sexual Activity    Alcohol use: No     Alcohol/week: 0.0 oz     Comment: rarely    Drug use: No    Sexual activity: Not Currently   Lifestyle    Physical activity:     Days per week: Not on file     Minutes per session: Not on file    Stress: Not on file   Relationships    Social connections:     Talks on phone: Not on file     Gets together: Not on file     Attends Denominational service: Not on file     Active member of club or organization: Not on file     Attends meetings of clubs or organizations: Not on file     Relationship status: Not on file   Other Topics Concern    Are you pregnant or think you may be? No    Breast-feeding No   Social History Narrative    Lives with  "boyfriend/partner who helps with her care.            Review of Systems   Constitution: Negative for chills, diaphoresis, fever, malaise/fatigue and night sweats.   Cardiovascular: Positive for leg swelling. Negative for claudication and cyanosis.   Skin: Positive for color change, dry skin, nail changes, poor wound healing and unusual hair distribution.   Musculoskeletal:        Abnormal gait, uses rolling walker.    Neurological: Positive for numbness and sensory change. Negative for paresthesias, tremors and weakness.           Objective:        Vitals:    04/10/19 0805   BP: 107/69   Pulse: 69   Weight: 117.9 kg (260 lb)   Height: 5' 9.6" (1.768 m)           Physical Exam   Cardiovascular:   Pulses:       Dorsalis pedis pulses are 0 on the right side, and 0 on the left side.        Posterior tibial pulses are 1+ on the right side, and 1+ on the left side.   Diffuse non pitting edema b/L     Musculoskeletal:    Amputations noted to right hallux and right 2nd digit   Feet:   Right Foot:   Protective Sensation: 5 sites tested. 0 sites sensed.   Left Foot:   Protective Sensation: 5 sites tested. 0 sites sensed.   Lymphadenopathy:   Negative lymphadenopathy bilateral popliteal fossa and tarsal tunnel.  Negative lymphangitic streaking bilateral foot/ankle bilateral.     Neurological:   Absent/loss of protective sensation all toes bilateral to 10 gram monofilament.     Skin: Lesion noted.   Nails x8 are elongated by  4-6mm's, thickened by 2-3 mm's, dystrophic, and are yellowish in  coloration .     Ulceration  Location: right lateral leg  Measurements: 1.0x 1.3x 0.3cm  Drainage: serous  Wound base: fibrogranular  SOI: none    Ulceration  Location: left medial hallux  Measurements: 1.0x 1.0x 0.1cm  Drainage: serous  Wound base: fibrogranlar  SOI: none    Ulceration  Location: right sub 2nd met  Measurements: 1.5x 1.0x 0.2cm  Drainage: serous  Wound base: fibrogranular  SOI: none  Ruptured blister on right dorsal " ankle  0.5x 0.5x 0.1cm  Granular base  No SOI                             Assessment:       Encounter Diagnoses   Name Primary?    Skin ulcer of toe of left foot, limited to breakdown of skin     Ulcer of right lower extremity with fat layer exposed Yes         Plan:       Sisi was seen today for diabetes mellitus and diabetic foot exam.    Diagnoses and all orders for this visit:    Ulcer of right lower extremity with fat layer exposed    Skin ulcer of toe of left foot, limited to breakdown of skin      I counseled the patient on her conditions, their implications and medical management.    Ulceration on right foot and leg debrided through sub-q tissue using an tissue nipper. Ulceration debrided down to healthy tissue. Pt tolerated debridement well.   Football dressing applied to pts b/L foot and wrapping to right leg,  by Sadie Chauhan MA under my direct supervision. Pt tolerated dressing well.   RTC in 1 week or sooner if any new pedal problems arise, any signs of infection occur, or if condition worsens.

## 2019-04-10 NOTE — TELEPHONE ENCOUNTER
----- Message from Radha Angel sent at 4/9/2019  2:39 PM CDT -----  Contact: Brigitte/ Nurse/ [facility]/ [phone] Ext [phone]  Type: Returning a call    Who left a message?Archana Quiñones MA    When did the practice call?Today    Comments:Please call back.      Thanks

## 2019-04-17 NOTE — PROGRESS NOTES
INR at goal. Meds and chart reviewed. Upcoming procedure 4/24 needing interruption. See calendar for dose and f/u plan.

## 2019-04-18 NOTE — PROGRESS NOTES
I spoke to the patient and the EMG was cancelled and she was not given a reschedule date.  I instructed her to call us when it has been rescheduled and I will have pharmD review chart to see how she should resume her Coumadin which she verbalized understanding.  Chat routed for review.    I called patient and instructed her to take 1 tab Coumadin daily after 5pm which is her maintenance plan dose and to call when she is given a new date for the EMG to be scheduled so we can do procedure planning which she verbalized understanding.

## 2019-04-18 NOTE — PROGRESS NOTES
Patient called 04/18/19 to inform us that her (EMG) for 4/24/19 has been cancelled. Patient need to know what to do from here. Also is her appointment still schedule for 05/01/19. Please advise.

## 2019-04-24 NOTE — PROGRESS NOTES
Subjective:      Patient ID: Sisi Medel is a 58 y.o. female.    Chief Complaint: Diabetes Mellitus (03/01/2019 dr jill eduardo) and Diabetic Foot Exam    Pt here today for multiple wounds on lower extremities. Denies any NVFCSOB chest or calf pain. Pt has a nurse changing dressings 3 times a week.     Past Medical History:   Diagnosis Date    Anemia in ESRD (end-stage renal disease) 3/7/2016    Anticoagulant long-term use     Cervical radiculopathy 7/20/2015    CHF (congestive heart failure)     Chronic combined systolic and diastolic heart failure 6/14/2013    EF 20% 2013, improved with Medical therapy, negative PET 2013    Chronic respiratory failure with hypoxia 04/22/2013    On home oxygen at 2-5 liters    Closed fracture of proximal end of right fibula 6/27/2016    Complications due to renal dialysis device, implant, and graft     DDD (degenerative disc disease), lumbar 6/17/2015    Dependence on renal dialysis     Diabetes mellitus type II, controlled 12/8/2017    ESRD on hemodialysis 2/23/2016    Essential hypertension     Gout     Lateral meniscal tear 5/31/2016    Lumbar stenosis 6/17/2015    Obesity, Class III, BMI 40-49.9 (morbid obesity)     Pacemaker     Paroxysmal atrial fibrillation 5/16/2014    Not on anticoagulation    Peripheral neuropathy 11/13/2013    Personal history of gastric ulcer 3/19/2013    Pneumonia of left lower lobe due to infectious organism 3/10/2019    Sarcoidosis, lung 2009    Diagnosed in 2009 with ocular manifestation, on 4L home O2 and PO steroids    Secondary pulmonary hypertension 4/7/2015    Seizure disorder 3/19/2013    Shingles 11/13/2013    Thyroid disease            Current Outpatient Medications on File Prior to Visit   Medication Sig Dispense Refill    aspirin (ECOTRIN) 81 MG EC tablet Take 81 mg by mouth once daily.      atorvastatin (LIPITOR) 80 MG tablet TAKE 1 TABLET BY MOUTH EVERY EVENING 90 tablet 1    blood sugar  diagnostic Strp 1 each by Misc.(Non-Drug; Combo Route) route once daily. (Patient taking differently: 1 each by Misc.(Non-Drug; Combo Route) route 2 (two) times daily. ) 100 each 3    cinacalcet (SENSIPAR) 30 MG Tab Take 30 mg by mouth daily with breakfast.      clopidogrel (PLAVIX) 75 mg tablet TAKE 1 TABLET(75 MG) BY MOUTH EVERY DAY 90 tablet 0    fludrocortisone (FLORINEF) 0.1 mg Tab Take 1 tablet (100 mcg total) by mouth 2 (two) times daily. 60 tablet 5    gabapentin (NEURONTIN) 600 MG tablet Take 600 mg by mouth 2 (two) times daily.       lancets Misc 1 each by Misc.(Non-Drug; Combo Route) route once daily. (Patient taking differently: 1 each by Misc.(Non-Drug; Combo Route) route 2 (two) times daily. ) 100 each 3    levETIRAcetam (KEPPRA) 500 MG Tab TAKE 1 TABLET BY MOUTH TWICE DAILY 180 tablet 3    midodrine (PROAMATINE) 10 MG tablet Take 1 tablet (10 mg total) by mouth 3 (three) times daily. 90 tablet 11    nortriptyline (PAMELOR) 25 MG capsule Take 1 capsule by mouth nightly  1    omeprazole (PRILOSEC) 20 MG capsule TAKE 1 CAPSULE BY MOUTH EVERY DAY 90 capsule 4    patiromer calcium sorbitex (VELTASSA) 16.8 gram PwPk Take 33.6 g by mouth once daily.       prednisoLONE acetate (PRED FORTE) 1 % DrpS INSTILL ONE DROP INTO  LEFT EYE THREE TIMES A DAY  3    predniSONE (DELTASONE) 20 MG tablet Take 0.5 tablets (10 mg total) by mouth once daily. 30 tablet 2    pregabalin (LYRICA) 100 MG capsule Take 1 capsule (100 mg total) by mouth 3 (three) times daily. 270 capsule 1    DENISE-LINDA 0.8 mg Tab TAKE 1 TABLET BY MOUTH ONCE DAILY  0    RENVELA 800 mg Tab TAKE 1 TABLET BY MOUTH THREE TIMES DAILY WITH MEALS 90 tablet 0    tiZANidine (ZANAFLEX) 4 MG tablet TAKE 1 TABLET BY MOUTH EVERY DAY AS NEEDED FOR MUSCLE SPASMS (Patient taking differently: TAKE 1 TABLET BY MOUTH EVERY NIGHT  AS NEEDED FOR MUSCLE SPASMS) 90 tablet 0    TRADJENTA 5 mg Tab tablet TAKE 1 TABLET(5 MG) BY MOUTH EVERY DAY 90 tablet 0     warfarin (COUMADIN) 5 MG tablet TAKE 1 TABLET BY MOUTH EVERY DAY EXCEPT TH 1AND 1/2 TABLETS ON MONDAY AND FRIDAY OR AS DIRECTED (Patient taking differently: TAKE 1 TABLET BY MOUTH EVERY DAY OR AS DIRECTED) 120 tablet 0     No current facility-administered medications on file prior to visit.            Review of patient's allergies indicates:   Allergen Reactions    Bactrim [sulfamethoxazole-trimethoprim] Other (See Comments)     Causes renal failure    Nsaids (non-steroidal anti-inflammatory drug) Other (See Comments)     Renal failure           Social History     Socioeconomic History    Marital status: Single     Spouse name: Not on file    Number of children: Not on file    Years of education: Not on file    Highest education level: Not on file   Occupational History    Not on file   Social Needs    Financial resource strain: Not on file    Food insecurity:     Worry: Not on file     Inability: Not on file    Transportation needs:     Medical: Not on file     Non-medical: Not on file   Tobacco Use    Smoking status: Former Smoker     Packs/day: 0.50     Years: 10.00     Pack years: 5.00     Types: Cigarettes     Last attempt to quit: 1994     Years since quittin.0    Smokeless tobacco: Never Used   Substance and Sexual Activity    Alcohol use: No     Alcohol/week: 0.0 oz     Comment: rarely    Drug use: No    Sexual activity: Not Currently   Lifestyle    Physical activity:     Days per week: Not on file     Minutes per session: Not on file    Stress: Not on file   Relationships    Social connections:     Talks on phone: Not on file     Gets together: Not on file     Attends Christianity service: Not on file     Active member of club or organization: Not on file     Attends meetings of clubs or organizations: Not on file     Relationship status: Not on file   Other Topics Concern    Are you pregnant or think you may be? No    Breast-feeding No   Social History Narrative    Lives with  "boyfriend/partner who helps with her care.            Review of Systems   Constitution: Negative for chills, diaphoresis, fever, malaise/fatigue and night sweats.   Cardiovascular: Positive for leg swelling. Negative for claudication and cyanosis.   Skin: Positive for color change, dry skin, nail changes, poor wound healing and unusual hair distribution.   Musculoskeletal:        Abnormal gait, uses rolling walker.    Neurological: Positive for numbness and sensory change. Negative for paresthesias, tremors and weakness.           Objective:        Vitals:    04/17/19 1026   BP: 96/62   Pulse: 70   Weight: 117.9 kg (260 lb)   Height: 5' 9.6" (1.768 m)           Physical Exam   Cardiovascular:   Pulses:       Dorsalis pedis pulses are 0 on the right side, and 0 on the left side.        Posterior tibial pulses are 1+ on the right side, and 1+ on the left side.   Diffuse non pitting edema b/L     Musculoskeletal:    Amputations noted to right hallux and right 2nd digit   Feet:   Right Foot:   Protective Sensation: 5 sites tested. 0 sites sensed.   Left Foot:   Protective Sensation: 5 sites tested. 0 sites sensed.   Lymphadenopathy:   Negative lymphadenopathy bilateral popliteal fossa and tarsal tunnel.  Negative lymphangitic streaking bilateral foot/ankle bilateral.     Neurological:   Absent/loss of protective sensation all toes bilateral to 10 gram monofilament.     Skin: Lesion noted.   Nails x8 are short and dystrophic    Ulceration  Location: right lateral leg  Measurements: 1.0x 1.3x 0.3cm  Drainage: serous  Wound base: fibrogranular  SOI: none    Ulceration  Location: left medial hallux  Measurements: 1.0x 1.0x 0.1cm  Drainage: serous  Wound base: fibrogranlar  SOI: none    Ulceration  Location: right sub 2nd met  Measurements: 1.5x 1.0x 0.2cm  Drainage: serous  Wound base: fibrogranular  SOI: none  Ruptured blister on right dorsal ankle  0.5x 0.5x 0.1cm  Granular base  No SOI                           "   Assessment:       Encounter Diagnoses   Name Primary?    Skin ulcer of toe of left foot, limited to breakdown of skin Yes    Polyneuropathy associated with underlying disease     Ulcer of right lower extremity with fat layer exposed          Plan:       Sisi was seen today for diabetes mellitus and diabetic foot exam.    Diagnoses and all orders for this visit:    Skin ulcer of toe of left foot, limited to breakdown of skin    Polyneuropathy associated with underlying disease    Ulcer of right lower extremity with fat layer exposed      I counseled the patient on her conditions, their implications and medical management.    Ulceration on right foot and leg debrided through sub-q tissue using an tissue nipper. Ulceration debrided down to healthy tissue. Pt tolerated debridement well.   Football dressing applied to pts b/L foot and wrapping to right leg,  by Sadie Chauhan MA under my direct supervision. Pt tolerated dressing well.   RTC in 1 week or sooner if any new pedal problems arise, any signs of infection occur, or if condition worsens.

## 2019-04-24 NOTE — PROGRESS NOTES
Sisi Medel  3/27/2019    HPI:  Patient is a 55 y.o.  female with a h/o HTN, HLD, CHF (EF 20%), COPD on home O2, paroxysmal Afib (on Coumadin), seizure disorder, ESRD on HD TThS via LUE AVG (placed 2/3/16), PTA dilation of LUE AVG in late January 2019, and PTA dilation of LUE AVG 4/8/19, here for follow up.     Dialysis nurses have not had issues with her access since the stenosis was treated.     She has been undergoing RLE venous ulcer care in our wound clinic and has been seeing podiatry. Reports that this is healing slowly.      Patient takes ASA, statin, Plavix, coumadin.    S/p   2/3/16: LUE AVG creation (Dr Villafana)  6/21/16: Percutaneous mechanical thrombectomy w [place] Angiojet AVX; 4fr OTW embolectomy of arterial inflow   PTA outflow stenosis with a 7x40 mm balloon  10/12/16: fistulogram (75% stenosis noted), left upper extremity AV graft PTA venous outflow with resolution of stenosis noted  2/2017 Declot and venous outflow PTA and stent with 8 x 50 VIABAHN  5/15/2017: PTA outflow stenosis (8x60 [place]ang); Stent outflow stenosis (8x50 Vi[place]ahn)  10/12/17: PTA LUE AV graft (brachial vein) x2: (7x60 [place]); (8x40 [place])   01/10/18: PTA outflow [place] (8x40 [place]) and (9x40 [place])   2/2/18: PTA arterial inflow (7 x 40 [place])  8/2018: 1.  PTA outflow stenosis 8 x 40 [place]; PTA outflow 10 x 40 [place] balloon.  1/2019: PTA LUE AVG outflow 9 x 60 [employer] Coated Balloon and 8 x 40 [place]  4/4019: PTA LUE AVG outflow stenosis: 8 x 40 [place] and 10 x 40 [place]    No MI/stroke  Tobacco use: Former smoker     Past Medical History   Diagnosis Date    Anemia in ESRD (end-stage renal disease) 3/7/2016    Cervical radiculopathy 7/20/2015    Chronic combined systolic and diastolic heart failure 6/14/2013     EF 20% 2013, improved with Medical therapy, negative PET 2013    Chronic respiratory failure with hypoxia 04/22/2013     On home oxygen at 2-5 liters    Chronic rhinitis  10/2/2013    DDD (degenerative disc disease), lumbar 2015    ESRD on hemodialysis 2016    Essential hypertension     Gout     Hyperlipidemia 3/7/2016    Lateral meniscal tear 2016    Lumbar stenosis 2015    Morbid obesity 8/15/2013    Paroxysmal atrial fibrillation 2014     Not on anticoagulation    Peripheral neuropathy 2013    Personal history of fall 2014    Personal history of gastric ulcer 3/19/2013    Sarcoidosis, lung 2009     Diagnosed in  with ocular manifestation, on 4L home O2 and PO steroids    Secondary pulmonary hypertension 2015    Seizure disorder 3/19/2013    Shingles 2013    Spondylarthrosis 2015     Past Surgical History   Procedure Laterality Date     section, classic      Abdominal surgery      Vascular surgery       Family History   Problem Relation Age of Onset    Kidney failure Mother     Hypertension Mother     Diabetes Mother     Coronary artery disease Father     Hypertension Father     Diabetes Father     Lupus Sister     Diabetes Maternal Grandmother     Asthma Neg Hx     Emphysema Neg Hx     Melanoma Neg Hx     Psoriasis Neg Hx      Social History     Social History    Marital status: Single     Spouse name: N/A    Number of children: N/A    Years of education: N/A     Occupational History    Not on file.     Social History Main Topics    Smoking status: Former Smoker     Packs/day: 0.50     Years: 10.00     Types: Cigarettes     Quit date: 1994    Smokeless tobacco: Never Used    Alcohol use No    Drug use: No    Sexual activity: No     Other Topics Concern    Not on file     Social History Narrative    Lives with boyfriend/partner who helps with her care.     Plans to travel to [place] for 2 weeks in 2016     Current Outpatient Prescriptions on File Prior to Visit   Medication Sig    ACZONE 5 % topical gel Apply topically once daily.     allopurinol (ZYLOPRIM) 100 MG tablet  Take 1 tablet (100 mg total) by mouth once daily.    amiodarone (PACERONE) 200 MG Tab Take 1 tablet (200 mg total) by mouth every morning.    ammonium lactate (LAC-HYDRIN) 12 % lotion Apply topically as needed (for scars on arms).     aspirin 81 MG Chew Take 81 mg by mouth every morning.    atorvastatin (LIPITOR) 80 MG tablet Take 80 mg by mouth once daily.     capsicum 0.075% (ZOSTRIX) 0.075 % topical cream Apply topically 3 (three) times daily. For neuropathic pain of feet    gabapentin (NEURONTIN) 100 MG capsule TAKE ONE CAPSULE BY MOUTH THREE TIMES DAILY (Patient taking differently: TAKE ONE CAPSULE BY MOUTH THREE TIMES DAILY-noon, evening, bedtime)    levetiracetam (KEPPRA) 500 MG Tab TAKE 1 TABLET BY MOUTH TWICE DAILY.    melatonin 5 mg Tab Take 1 tablet by mouth nightly.    midodrine (PROAMATINE) 10 MG tablet     midodrine (PROAMATINE) 5 MG Tab     NEPHRO-LINDA 0.8 mg Tab TK 1 T PO  QD    ondansetron (ZOFRAN-ODT) 8 MG TbDL Take 1 tablet (8 mg total) by mouth every 8 (eight) hours as needed.    oxycodone-acetaminophen (PERCOCET) 7.5-325 mg per tablet Take 1 tablet by mouth every 4 (four) hours as needed.    predniSONE (DELTASONE) 10 MG tablet TAKE 3 TABLETS BY MOUTH FOR 7 DAYS, THEN 2 TABLETS FOR 7 DAYS, THEN 1 TABLET EVERY DAY AFTER.    RENVELA 800 mg Tab TAKE 1 TABLET BY MOUTH THREE TIMES DAILY WITH MEALS    tizanidine 4 mg Cap Take 4 mg by mouth 2 (two) times daily as needed. (Patient taking differently: Take 4 mg by mouth daily as needed (for muscle spasms.). )    warfarin (COUMADIN) 5 MG tablet TAKE 1 TABLET(5 MG) BY MOUTH DAILY     No current facility-administered medications on file prior to visit.        REVIEW OF SYSTEMS:  General: negative; ENT: negative; Allergy and Immunology: negative; Hematological and Lymphatic: Negative; Endocrine: negative; Respiratory: no cough, increased shortness of breath this morning, or wheezing; Cardiovascular: no chest pain or dyspnea on exertion;  Gastrointestinal: no abdominal pain/back, change in bowel habits, or bloody stools; Genito-Urinary: no dysuria, trouble voiding, or hematuria; Musculoskeletal: see HPI  Neurological: no TIA or stroke symptoms; Psychiatric: no nervousness, anxiety or depression.    PHYSICAL EXAM:   Vitals:    04/24/19 1434   BP: 113/73   Pulse: 69   Temp: 97.8 °F (36.6 °C)         General appearance:  Alert, well-appearing, and in no distress.  Oriented to person, place, and time   Neurological:  Normal speech, no focal findings noted; CN II - XII grossly intact           Musculoskeletal: Digits/nail without cyanosis/clubbing.  Normal muscle strength/tone.                 Neck: Supple, no significant adenopathy; thyroid is not enlarged                Chest:  Clear to auscultation, symmetric air entry      No use of accessory muscles             Cardiac: Normal rate and regular rhythm, S1 and S2 normal; PMI non-displaced          Abdomen: Soft, non-tender, non-distended, no masses or organomegaly      Extremities:   LUE AVG with palpable thrill, pulsatile, 2+ distal radial pulse;       RLE with hyperpigmentation, induration from distal calf to proximal foot, 2 x 2 cm superficial venous ulceration with mild serous exudate seen on wound care imaging          LAB RESULTS:  Lab Results   Component Value Date    WBC 11.83 04/01/2019    HGB 10.9 (L) 04/01/2019    HCT 41 04/10/2019    MCV 91 04/01/2019     04/01/2019     BMP  Lab Results   Component Value Date     04/10/2019    K 5.8 (H) 04/10/2019    CL 98 04/10/2019    CO2 23 04/10/2019    BUN 20 04/10/2019    CREATININE 5.8 (H) 04/10/2019    CALCIUM 8.1 (L) 04/10/2019    ANIONGAP 16 04/10/2019    ESTGFRAFRICA 8.6 (A) 04/10/2019    EGFRNONAA 7.5 (A) 04/10/2019     Lab Results   Component Value Date    HGBA1C 6.2 (H) 03/10/2019       IMAGING:    3/27/2019  Elevated PSV with narrowing at the distal stent with velocity of 320 cm/s. Volume at mid graft measures 1421  mL/min    2/20/2019  Elevated PSV with narrowing in proximal graft with velocity at 543 cm/s. Volume at mid-bicep is 1719 ml/min.    1/4/19 VASU - 1.31 R, 1.31 L; PVR waveforms suggest minimal peripheral arterial occlusive disease    EXAM KANDIS: 04/24/19    IMP/PLAN:  55 y.o. female with HTN, HLD, CHF (EF 20%), COPD on home O2, paroxysmal Afib (on Coumadin), seizure disorder, ESRD on HD TThS via LUE AVG.     1) RTC in 8 weeks with duplex  2) continue coumadin, statin, asa    Torrey Villafana MD  Vascular & Endovascular Surgery

## 2019-04-26 NOTE — TELEPHONE ENCOUNTER
----- Message from Elizabeth Brown sent at 4/26/2019  9:01 AM CDT -----  Contact: Brigitte/[facility]  Please call Brigitte at [phone]    Fax# [phone]    Please fax wound care orders for the right leg and /or the left leg    Thank you

## 2019-04-29 NOTE — PROGRESS NOTES
Subjective:      Patient ID: Sisi Medel is a 58 y.o. female.    Chief Complaint: Diabetes Mellitus (03/01/2019 dr jill eduardo) and Diabetic Foot Exam    Pt here today for multiple wounds on lower extremities. Denies any NVFCSOB chest or calf pain. Pt has a nurse changing dressings 3 times a week.     Past Medical History:   Diagnosis Date    Anemia in ESRD (end-stage renal disease) 3/7/2016    Anticoagulant long-term use     Cervical radiculopathy 7/20/2015    CHF (congestive heart failure)     Chronic combined systolic and diastolic heart failure 6/14/2013    EF 20% 2013, improved with Medical therapy, negative PET 2013    Chronic respiratory failure with hypoxia 04/22/2013    On home oxygen at 2-5 liters    Closed fracture of proximal end of right fibula 6/27/2016    Complications due to renal dialysis device, implant, and graft     DDD (degenerative disc disease), lumbar 6/17/2015    Dependence on renal dialysis     Diabetes mellitus type II, controlled 12/8/2017    ESRD on hemodialysis 2/23/2016    Essential hypertension     Gout     Lateral meniscal tear 5/31/2016    Lumbar stenosis 6/17/2015    Obesity, Class III, BMI 40-49.9 (morbid obesity)     Pacemaker     Paroxysmal atrial fibrillation 5/16/2014    Not on anticoagulation    Peripheral neuropathy 11/13/2013    Personal history of gastric ulcer 3/19/2013    Pneumonia of left lower lobe due to infectious organism 3/10/2019    Sarcoidosis, lung 2009    Diagnosed in 2009 with ocular manifestation, on 4L home O2 and PO steroids    Secondary pulmonary hypertension 4/7/2015    Seizure disorder 3/19/2013    Shingles 11/13/2013    Thyroid disease            Current Outpatient Medications on File Prior to Visit   Medication Sig Dispense Refill    aspirin (ECOTRIN) 81 MG EC tablet Take 81 mg by mouth once daily.      atorvastatin (LIPITOR) 80 MG tablet TAKE 1 TABLET BY MOUTH EVERY EVENING 90 tablet 1    blood sugar  diagnostic Strp 1 each by Misc.(Non-Drug; Combo Route) route once daily. (Patient taking differently: 1 each by Misc.(Non-Drug; Combo Route) route 2 (two) times daily. ) 100 each 3    cinacalcet (SENSIPAR) 30 MG Tab Take 30 mg by mouth daily with breakfast.      clopidogrel (PLAVIX) 75 mg tablet TAKE 1 TABLET(75 MG) BY MOUTH EVERY DAY 90 tablet 0    fludrocortisone (FLORINEF) 0.1 mg Tab Take 1 tablet (100 mcg total) by mouth 2 (two) times daily. 60 tablet 5    gabapentin (NEURONTIN) 600 MG tablet Take 600 mg by mouth 2 (two) times daily.       lancets Misc 1 each by Misc.(Non-Drug; Combo Route) route once daily. (Patient taking differently: 1 each by Misc.(Non-Drug; Combo Route) route 2 (two) times daily. ) 100 each 3    levETIRAcetam (KEPPRA) 500 MG Tab TAKE 1 TABLET BY MOUTH TWICE DAILY 180 tablet 3    midodrine (PROAMATINE) 10 MG tablet Take 1 tablet (10 mg total) by mouth 3 (three) times daily. 90 tablet 11    nortriptyline (PAMELOR) 25 MG capsule Take 1 capsule by mouth nightly  1    omeprazole (PRILOSEC) 20 MG capsule TAKE 1 CAPSULE BY MOUTH EVERY DAY 90 capsule 4    patiromer calcium sorbitex (VELTASSA) 16.8 gram PwPk Take 33.6 g by mouth once daily.       prednisoLONE acetate (PRED FORTE) 1 % DrpS INSTILL ONE DROP INTO  LEFT EYE THREE TIMES A DAY  3    predniSONE (DELTASONE) 20 MG tablet Take 0.5 tablets (10 mg total) by mouth once daily. 30 tablet 2    pregabalin (LYRICA) 100 MG capsule Take 1 capsule (100 mg total) by mouth 3 (three) times daily. 270 capsule 1    DENISE-LINDA 0.8 mg Tab TAKE 1 TABLET BY MOUTH ONCE DAILY  0    RENVELA 800 mg Tab TAKE 1 TABLET BY MOUTH THREE TIMES DAILY WITH MEALS 90 tablet 0    tiZANidine (ZANAFLEX) 4 MG tablet TAKE 1 TABLET BY MOUTH EVERY DAY AS NEEDED FOR MUSCLE SPASMS (Patient taking differently: TAKE 1 TABLET BY MOUTH EVERY NIGHT  AS NEEDED FOR MUSCLE SPASMS) 90 tablet 0    TRADJENTA 5 mg Tab tablet TAKE 1 TABLET(5 MG) BY MOUTH EVERY DAY 90 tablet 0     warfarin (COUMADIN) 5 MG tablet TAKE 1 TABLET BY MOUTH EVERY DAY EXCEPT TH 1AND 1/2 TABLETS ON MONDAY AND FRIDAY OR AS DIRECTED (Patient taking differently: TAKE 1 TABLET BY MOUTH EVERY DAY OR AS DIRECTED) 120 tablet 0     No current facility-administered medications on file prior to visit.            Review of patient's allergies indicates:   Allergen Reactions    Bactrim [sulfamethoxazole-trimethoprim] Other (See Comments)     Causes renal failure    Nsaids (non-steroidal anti-inflammatory drug) Other (See Comments)     Renal failure           Social History     Socioeconomic History    Marital status: Single     Spouse name: Not on file    Number of children: Not on file    Years of education: Not on file    Highest education level: Not on file   Occupational History    Not on file   Social Needs    Financial resource strain: Not on file    Food insecurity:     Worry: Not on file     Inability: Not on file    Transportation needs:     Medical: Not on file     Non-medical: Not on file   Tobacco Use    Smoking status: Former Smoker     Packs/day: 0.50     Years: 10.00     Pack years: 5.00     Types: Cigarettes     Last attempt to quit: 1994     Years since quittin.0    Smokeless tobacco: Never Used   Substance and Sexual Activity    Alcohol use: No     Alcohol/week: 0.0 oz     Comment: rarely    Drug use: No    Sexual activity: Not Currently   Lifestyle    Physical activity:     Days per week: Not on file     Minutes per session: Not on file    Stress: Not on file   Relationships    Social connections:     Talks on phone: Not on file     Gets together: Not on file     Attends Worship service: Not on file     Active member of club or organization: Not on file     Attends meetings of clubs or organizations: Not on file     Relationship status: Not on file   Other Topics Concern    Are you pregnant or think you may be? No    Breast-feeding No   Social History Narrative    Lives with  "boyfriend/partner who helps with her care.            Review of Systems   Constitution: Negative for chills, diaphoresis, fever, malaise/fatigue and night sweats.   Cardiovascular: Positive for leg swelling. Negative for claudication and cyanosis.   Skin: Positive for color change, dry skin, nail changes, poor wound healing and unusual hair distribution.   Musculoskeletal:        Abnormal gait, uses rolling walker.    Neurological: Positive for numbness and sensory change. Negative for paresthesias, tremors and weakness.           Objective:        Vitals:    04/24/19 1204   BP: 95/61   Pulse: 69   Weight: 117.9 kg (260 lb)   Height: 5' 10.8" (1.798 m)           Physical Exam   Cardiovascular:   Pulses:       Dorsalis pedis pulses are 0 on the right side, and 0 on the left side.        Posterior tibial pulses are 1+ on the right side, and 1+ on the left side.   Diffuse non pitting edema b/L     Musculoskeletal:    Amputations noted to right hallux and right 2nd digit   Feet:   Right Foot:   Protective Sensation: 5 sites tested. 0 sites sensed.   Left Foot:   Protective Sensation: 5 sites tested. 0 sites sensed.   Lymphadenopathy:   Negative lymphadenopathy bilateral popliteal fossa and tarsal tunnel.  Negative lymphangitic streaking bilateral foot/ankle bilateral.     Neurological:   Absent/loss of protective sensation all toes bilateral to 10 gram monofilament.     Skin: Lesion noted.   Nails x8 are short and dystrophic    Ulceration  Location: right lateral leg  Measurements: 1.0x 1.6x 0.3cm  Drainage: serous  Wound base: fibrogranular  SOI: none    Ulceration  Location: left medial hallux  Measurements: 1.0x 1.0x 0.1cm  Drainage: serous  Wound base: fibrogranlar  SOI: none    Ulceration  Location: right sub 2nd met  Measurements: 1.5x 1.0x 0.2cm  Drainage: serous  Wound base: fibrogranular  SOI: none  Ruptured blister on right dorsal ankle  0.5x 0.5x 0.1cm  Granular base  No SOI                           "   Assessment:       Encounter Diagnoses   Name Primary?    Polyneuropathy associated with underlying disease Yes    Ulcer of right lower extremity with fat layer exposed     Skin ulcer of toe of left foot, limited to breakdown of skin          Plan:       Sisi was seen today for diabetes mellitus and diabetic foot exam.    Diagnoses and all orders for this visit:    Polyneuropathy associated with underlying disease    Ulcer of right lower extremity with fat layer exposed    Skin ulcer of toe of left foot, limited to breakdown of skin      I counseled the patient on her conditions, their implications and medical management.    Ulceration on right foot and leg debrided through sub-q tissue using an tissue nipper. Ulceration debrided down to healthy tissue. Pt tolerated debridement well.   Football dressing applied to pts b/L foot and wrapping to right leg,  by Sadie Chauhan MA under my direct supervision. Pt tolerated dressing well.   RTC in 1 week or sooner if any new pedal problems arise, any signs of infection occur, or if condition worsens.

## 2019-05-01 NOTE — PROGRESS NOTES
INR subtherapeutic today. Patient reports having some broccoli. She denies any other changes. We will boost her dose today then resume. Follow up next week. Care plan made with A Manjinder Pharm D

## 2019-05-01 NOTE — PATIENT INSTRUCTIONS
Diluted white vinegar soaks 1:1  x15min 1-2 times/week    Use antifungal cream (Lamisil cream) twice/week    Biotin supplement- 5000mcg/day.

## 2019-05-01 NOTE — PROGRESS NOTES
Subjective:       Patient ID:  Sisi Medel is a 58 y.o. female who presents for   Chief Complaint   Patient presents with    Nail Problem     follow-up     Rash     follow-up      Nail Problem  - Follow-up  Symptom course: unchanged (last seen 2/20/19)  Currently using: completed PO course of Lamisil x30 days w/ Dr. Wang, also had nails removed w/ him. last seen 2/20/19 by me, started on topical penlac. Patient reports that topical is too expensive.  Affected locations: right fingers and left fingers  Signs / symptoms: non-healing  Severity: mild to moderate        Review of Systems   Constitutional: Negative for fever and chills.   Skin: Positive for rash.   Hematologic/Lymphatic: Adenopathy: coumadin & plavix. Bruises/bleeds easily.        Objective:    Physical Exam   Constitutional: She appears well-developed and well-nourished. She is obese.  She is on home oxygen.  No distress.   Neurological: She is alert and oriented to person, place, and time. She is not disoriented.   Psychiatric: She has a normal mood and affect.   Skin:   Areas Examined (abnormalities noted in diagram):   Nails and Digits Inspection Performed             Diagram Legend     Erythematous scaling macule/papule c/w actinic keratosis       Vascular papule c/w angioma      Pigmented verrucoid papule/plaque c/w seborrheic keratosis      Yellow umbilicated papule c/w sebaceous hyperplasia      Irregularly shaped tan macule c/w lentigo     1-2 mm smooth white papules consistent with Milia      Movable subcutaneous cyst with punctum c/w epidermal inclusion cyst      Subcutaneous movable cyst c/w pilar cyst      Firm pink to brown papule c/w dermatofibroma      Pedunculated fleshy papule(s) c/w skin tag(s)      Evenly pigmented macule c/w junctional nevus     Mildly variegated pigmented, slightly irregular-bordered macule c/w mildly atypical nevus      Flesh colored to evenly pigmented papule c/w intradermal nevus       Pink  pearly papule/plaque c/w basal cell carcinoma      Erythematous hyperkeratotic cursted plaque c/w SCC      Surgical scar with no sign of skin cancer recurrence      Open and closed comedones      Inflammatory papules and pustules      Verrucoid papule consistent consistent with wart     Erythematous eczematous patches and plaques     Dystrophic onycholytic nail with subungual debris c/w onychomycosis     Umbilicated papule    Erythematous-base heme-crusted tan verrucoid plaque consistent with inflamed seborrheic keratosis     Erythematous Silvery Scaling Plaque c/w Psoriasis     See annotation      Assessment / Plan:        Onycholysis    Diluted white vinegar soaks 1:1  x15min 1-2 times/week    Use antifungal cream (Lamisil cream) twice/week    Biotin supplement- 5000mcg/day.           Follow up if symptoms worsen or fail to improve.

## 2019-05-02 NOTE — PROGRESS NOTES
Sisi Medel  3/27/2019    HPI:  Patient is a 55 y.o.  female with a h/o HTN, HLD, CHF (EF 20%), COPD on home O2, paroxysmal Afib (on Coumadin), seizure disorder, ESRD on HD TThS via LUE AVG (placed 2/3/16), PTA dilation of LUE AVG in late January 2019, and PTA dilation of LUE AVG 4/8/19, here for follow up.     Recent difficulty with arterial access point.    She has been undergoing RLE venous ulcer care in our wound clinic and has been seeing podiatry. Reports that this is healing slowly.      Patient takes ASA, statin, Plavix, coumadin.    S/p   2/3/16: LUE AVG creation (Dr Villafana)  6/21/16: Percutaneous mechanical thrombectomy w [employer] Angiojet AVX; 4fr OTW embolectomy of arterial inflow   PTA outflow stenosis with a 7x40 mm balloon  10/12/16: fistulogram (75% stenosis noted), left upper extremity AV graft PTA venous outflow with resolution of stenosis noted  2/2017 Declot and venous outflow PTA and stent with 8 x 50 VIABAHN  5/15/2017: PTA outflow stenosis (8x60 [place]ang); Stent outflow stenosis (8x50 Viabahn)  10/12/17: PTA LUE AV graft (brachial vein) x2: (7x60 [place]); (8x40 [place])   01/10/18: PTA outflow stensois (8x40 [place]) and (9x40 [place])   2/2/18: PTA arterial inflow (7 x 40 [place])  8/2018: 1.  PTA outflow stenosis 8 x 40 [place]; PTA outflow 10 x 40 [place] balloon.  1/2019: PTA LUE AVG outflow 9 x 60 [employer] [facility] Coated Balloon and 8 x 40 [place]  4/4019: PTA LUE AVG outflow stenosis: 8 x 40 [place] and 10 x 40 [place]    No MI/stroke  Tobacco use: Former smoker     Past Medical History   Diagnosis Date    Anemia in ESRD (end-stage renal disease) 3/7/2016    Cervical radiculopathy 7/20/2015    Chronic combined systolic and diastolic heart failure 6/14/2013     EF 20% 2013, improved with Medical therapy, negative PET 2013    Chronic respiratory failure with hypoxia 04/22/2013     On home oxygen at 2-5 liters    Chronic rhinitis 10/2/2013    DDD (degenerative disc  disease), lumbar 2015    ESRD on hemodialysis 2016    Essential hypertension     Gout     Hyperlipidemia 3/7/2016    Lateral meniscal tear 2016    Lumbar stenosis 2015    Morbid obesity 8/15/2013    Paroxysmal atrial fibrillation 2014     Not on anticoagulation    Peripheral neuropathy 2013    Personal history of fall 2014    Personal history of gastric ulcer 3/19/2013    Sarcoidosis, lung 2009     Diagnosed in  with ocular manifestation, on 4L home O2 and PO steroids    Secondary pulmonary hypertension 2015    Seizure disorder 3/19/2013    Shingles 2013    Spondylarthrosis 2015     Past Surgical History   Procedure Laterality Date     section, classic      Abdominal surgery      Vascular surgery       Family History   Problem Relation Age of Onset    Kidney failure Mother     Hypertension Mother     Diabetes Mother     Coronary artery disease Father     Hypertension Father     Diabetes Father     Lupus Sister     Diabetes Maternal Grandmother     Asthma Neg Hx     Emphysema Neg Hx     Melanoma Neg Hx     Psoriasis Neg Hx      Social History     Social History    Marital status: Single     Spouse name: N/A    Number of children: N/A    Years of education: N/A     Occupational History    Not on file.     Social History Main Topics    Smoking status: Former Smoker     Packs/day: 0.50     Years: 10.00     Types: Cigarettes     Quit date: 1994    Smokeless tobacco: Never Used    Alcohol use No    Drug use: No    Sexual activity: No     Other Topics Concern    Not on file     Social History Narrative    Lives with boyfriend/partner who helps with her care.     Plans to travel to [place] for 2 weeks in 2016     Current Outpatient Prescriptions on File Prior to Visit   Medication Sig    ACZONE 5 % topical gel Apply topically once daily.     allopurinol (ZYLOPRIM) 100 MG tablet Take 1 tablet (100 mg total) by mouth  once daily.    amiodarone (PACERONE) 200 MG Tab Take 1 tablet (200 mg total) by mouth every morning.    ammonium lactate (LAC-HYDRIN) 12 % lotion Apply topically as needed (for scars on arms).     aspirin 81 MG Chew Take 81 mg by mouth every morning.    atorvastatin (LIPITOR) 80 MG tablet Take 80 mg by mouth once daily.     capsicum 0.075% (ZOSTRIX) 0.075 % topical cream Apply topically 3 (three) times daily. For neuropathic pain of feet    gabapentin (NEURONTIN) 100 MG capsule TAKE ONE CAPSULE BY MOUTH THREE TIMES DAILY (Patient taking differently: TAKE ONE CAPSULE BY MOUTH THREE TIMES DAILY-noon, evening, bedtime)    levetiracetam (KEPPRA) 500 MG Tab TAKE 1 TABLET BY MOUTH TWICE DAILY.    melatonin 5 mg Tab Take 1 tablet by mouth nightly.    midodrine (PROAMATINE) 10 MG tablet     midodrine (PROAMATINE) 5 MG Tab     NEPHRO-LINDA 0.8 mg Tab TK 1 T PO  QD    ondansetron (ZOFRAN-ODT) 8 MG TbDL Take 1 tablet (8 mg total) by mouth every 8 (eight) hours as needed.    oxycodone-acetaminophen (PERCOCET) 7.5-325 mg per tablet Take 1 tablet by mouth every 4 (four) hours as needed.    predniSONE (DELTASONE) 10 MG tablet TAKE 3 TABLETS BY MOUTH FOR 7 DAYS, THEN 2 TABLETS FOR 7 DAYS, THEN 1 TABLET EVERY DAY AFTER.    RENVELA 800 mg Tab TAKE 1 TABLET BY MOUTH THREE TIMES DAILY WITH MEALS    tizanidine 4 mg Cap Take 4 mg by mouth 2 (two) times daily as needed. (Patient taking differently: Take 4 mg by mouth daily as needed (for muscle spasms.). )    warfarin (COUMADIN) 5 MG tablet TAKE 1 TABLET(5 MG) BY MOUTH DAILY     No current facility-administered medications on file prior to visit.        REVIEW OF SYSTEMS:  General: negative; ENT: negative; Allergy and Immunology: negative; Hematological and Lymphatic: Negative; Endocrine: negative; Respiratory: no cough, increased shortness of breath this morning, or wheezing; Cardiovascular: no chest pain or dyspnea on exertion; Gastrointestinal: no abdominal pain/back,  change in bowel habits, or bloody stools; Genito-Urinary: no dysuria, trouble voiding, or hematuria; Musculoskeletal: see HPI  Neurological: no TIA or stroke symptoms; Psychiatric: no nervousness, anxiety or depression.    PHYSICAL EXAM:   Vitals:    05/01/19 1558   BP: 104/73   Pulse: 70   Temp: 98.7 °F (37.1 °C)         General appearance:  Alert, well-appearing, and in no distress.  Oriented to person, place, and time   Neurological:  Normal speech, no focal findings noted; CN II - XII grossly intact           Musculoskeletal: Digits/nail without cyanosis/clubbing.  Normal muscle strength/tone.                 Neck: Supple, no significant adenopathy; thyroid is not enlarged                Chest:  Clear to auscultation, symmetric air entry      No use of accessory muscles             Cardiac: Normal rate and regular rhythm, S1 and S2 normal; PMI non-displaced          Abdomen: Soft, non-tender, non-distended, no masses or organomegaly      Extremities:   LUE AVG with palpable thrill, pulsatile, 2+ distal radial pulse;       RLE with hyperpigmentation, induration from distal calf to proximal foot, 2 x 2 cm superficial venous ulceration with mild serous exudate seen on wound care imaging          LAB RESULTS:  Lab Results   Component Value Date    WBC 11.83 04/01/2019    HGB 10.9 (L) 04/01/2019    HCT 41 04/10/2019    MCV 91 04/01/2019     04/01/2019     BMP  Lab Results   Component Value Date     04/10/2019    K 5.8 (H) 04/10/2019    CL 98 04/10/2019    CO2 23 04/10/2019    BUN 20 04/10/2019    CREATININE 5.8 (H) 04/10/2019    CALCIUM 8.1 (L) 04/10/2019    ANIONGAP 16 04/10/2019    ESTGFRAFRICA 8.6 (A) 04/10/2019    EGFRNONAA 7.5 (A) 04/10/2019     Lab Results   Component Value Date    HGBA1C 6.2 (H) 03/10/2019       IMAGING:    Arterial Anastomosis= 232cm/s  Proximal Graft= 507cm/s  Mid Graft= 206cm/s  Mid/Distal Graft= 413cm/s  Distal Graft= 135cm/s  Venous Anastomosis= 101cm/s  Venous Outflow=  147cm/s    Duplex ultrasound of the left upper hemodialysis graft reveals a 50% stenosis at the proximal graft level. There is a second 50% stenosis at the mid to distal graft level.  The venous anastomosis and outflow stent is patent without stenosis. The volume flow is 1640mL/min.    IMP/PLAN:  55 y.o. female with HTN, HLD, CHF (EF 20%), COPD on home O2, paroxysmal Afib (on Coumadin), seizure disorder, ESRD on HD TThS via LUE AVG.  Recent challenges with proximal access, duplex shows new stenosis, will require fistulagram with retrograde access from distal portion of graft vs L transradial.      1) LUE fistulagram (poss transradial) next week  2) continue coumadin, statin, lou Villafana MD  Vascular & Endovascular Surgery

## 2019-05-02 NOTE — H&P (VIEW-ONLY)
Sisi Medel  3/27/2019    HPI:  Patient is a 55 y.o.  female with a h/o HTN, HLD, CHF (EF 20%), COPD on home O2, paroxysmal Afib (on Coumadin), seizure disorder, ESRD on HD TThS via LUE AVG (placed 2/3/16), PTA dilation of LUE AVG in late January 2019, and PTA dilation of LUE AVG 4/8/19, here for follow up.     Recent difficulty with arterial access point.    She has been undergoing RLE venous ulcer care in our wound clinic and has been seeing podiatry. Reports that this is healing slowly.      Patient takes ASA, statin, Plavix, coumadin.    S/p   2/3/16: LUE AVG creation (Dr Villafana)  6/21/16: Percutaneous mechanical thrombectomy w [employer] Angiojet AVX; 4fr OTW embolectomy of arterial inflow   PTA outflow stenosis with a 7x40 mm balloon  10/12/16: fistulogram (75% stenosis noted), left upper extremity AV graft PTA venous outflow with resolution of stenosis noted  2/2017 Declot and venous outflow PTA and stent with 8 x 50 VIABAHN  5/15/2017: PTA outflow stenosis (8x60 [place]); Stent outflow stenosis (8x50 Viabahn)  10/12/17: PTA LUE AV graft (brachial vein) x2: (7x60 [place]); (8x40 [place])   01/10/18: PTA outflow [place] (8x40 [place]) and (9x40 [place])   2/2/18: PTA arterial inflow (7 x 40 [place])  8/2018: 1.  PTA outflow stenosis 8 x 40 [place]; PTA outflow 10 x 40 [place] balloon.  1/2019: PTA LUE AVG outflow 9 x 60 [employer] [facility] Coated Balloon and 8 x 40 [place]  4/4019: PTA LUE AVG outflow stenosis: 8 x 40 [place] and 10 x 40 [place]    No MI/stroke  Tobacco use: Former smoker     Past Medical History   Diagnosis Date    Anemia in ESRD (end-stage renal disease) 3/7/2016    Cervical radiculopathy 7/20/2015    Chronic combined systolic and diastolic heart failure 6/14/2013     EF 20% 2013, improved with Medical therapy, negative PET 2013    Chronic respiratory failure with hypoxia 04/22/2013     On home oxygen at 2-5 liters    Chronic rhinitis 10/2/2013    DDD (degenerative disc  disease), lumbar 2015    ESRD on hemodialysis 2016    Essential hypertension     Gout     Hyperlipidemia 3/7/2016    Lateral meniscal tear 2016    Lumbar stenosis 2015    Morbid obesity 8/15/2013    Paroxysmal atrial fibrillation 2014     Not on anticoagulation    Peripheral neuropathy 2013    Personal history of fall 2014    Personal history of gastric ulcer 3/19/2013    Sarcoidosis, lung 2009     Diagnosed in  with ocular manifestation, on 4L home O2 and PO steroids    Secondary pulmonary hypertension 2015    Seizure disorder 3/19/2013    Shingles 2013    Spondylarthrosis 2015     Past Surgical History   Procedure Laterality Date     section, classic      Abdominal surgery      Vascular surgery       Family History   Problem Relation Age of Onset    Kidney failure Mother     Hypertension Mother     Diabetes Mother     Coronary artery disease Father     Hypertension Father     Diabetes Father     Lupus Sister     Diabetes Maternal Grandmother     Asthma Neg Hx     Emphysema Neg Hx     Melanoma Neg Hx     Psoriasis Neg Hx      Social History     Social History    Marital status: Single     Spouse name: N/A    Number of children: N/A    Years of education: N/A     Occupational History    Not on file.     Social History Main Topics    Smoking status: Former Smoker     Packs/day: 0.50     Years: 10.00     Types: Cigarettes     Quit date: 1994    Smokeless tobacco: Never Used    Alcohol use No    Drug use: No    Sexual activity: No     Other Topics Concern    Not on file     Social History Narrative    Lives with boyfriend/partner who helps with her care.     Plans to travel to [place] for 2 weeks in 2016     Current Outpatient Prescriptions on File Prior to Visit   Medication Sig    ACZONE 5 % topical gel Apply topically once daily.     allopurinol (ZYLOPRIM) 100 MG tablet Take 1 tablet (100 mg total) by mouth  once daily.    amiodarone (PACERONE) 200 MG Tab Take 1 tablet (200 mg total) by mouth every morning.    ammonium lactate (LAC-HYDRIN) 12 % lotion Apply topically as needed (for scars on arms).     aspirin 81 MG Chew Take 81 mg by mouth every morning.    atorvastatin (LIPITOR) 80 MG tablet Take 80 mg by mouth once daily.     capsicum 0.075% (ZOSTRIX) 0.075 % topical cream Apply topically 3 (three) times daily. For neuropathic pain of feet    gabapentin (NEURONTIN) 100 MG capsule TAKE ONE CAPSULE BY MOUTH THREE TIMES DAILY (Patient taking differently: TAKE ONE CAPSULE BY MOUTH THREE TIMES DAILY-noon, evening, bedtime)    levetiracetam (KEPPRA) 500 MG Tab TAKE 1 TABLET BY MOUTH TWICE DAILY.    melatonin 5 mg Tab Take 1 tablet by mouth nightly.    midodrine (PROAMATINE) 10 MG tablet     midodrine (PROAMATINE) 5 MG Tab     NEPHRO-LINDA 0.8 mg Tab TK 1 T PO  QD    ondansetron (ZOFRAN-ODT) 8 MG TbDL Take 1 tablet (8 mg total) by mouth every 8 (eight) hours as needed.    oxycodone-acetaminophen (PERCOCET) 7.5-325 mg per tablet Take 1 tablet by mouth every 4 (four) hours as needed.    predniSONE (DELTASONE) 10 MG tablet TAKE 3 TABLETS BY MOUTH FOR 7 DAYS, THEN 2 TABLETS FOR 7 DAYS, THEN 1 TABLET EVERY DAY AFTER.    RENVELA 800 mg Tab TAKE 1 TABLET BY MOUTH THREE TIMES DAILY WITH MEALS    tizanidine 4 mg Cap Take 4 mg by mouth 2 (two) times daily as needed. (Patient taking differently: Take 4 mg by mouth daily as needed (for muscle spasms.). )    warfarin (COUMADIN) 5 MG tablet TAKE 1 TABLET(5 MG) BY MOUTH DAILY     No current facility-administered medications on file prior to visit.        REVIEW OF SYSTEMS:  General: negative; ENT: negative; Allergy and Immunology: negative; Hematological and Lymphatic: Negative; Endocrine: negative; Respiratory: no cough, increased shortness of breath this morning, or wheezing; Cardiovascular: no chest pain or dyspnea on exertion; Gastrointestinal: no abdominal pain/back,  change in bowel habits, or bloody stools; Genito-Urinary: no dysuria, trouble voiding, or hematuria; Musculoskeletal: see HPI  Neurological: no TIA or stroke symptoms; Psychiatric: no nervousness, anxiety or depression.    PHYSICAL EXAM:   Vitals:    05/01/19 1558   BP: 104/73   Pulse: 70   Temp: 98.7 °F (37.1 °C)         General appearance:  Alert, well-appearing, and in no distress.  Oriented to person, place, and time   Neurological:  Normal speech, no focal findings noted; CN II - XII grossly intact           Musculoskeletal: Digits/nail without cyanosis/clubbing.  Normal muscle strength/tone.                 Neck: Supple, no significant adenopathy; thyroid is not enlarged                Chest:  Clear to auscultation, symmetric air entry      No use of accessory muscles             Cardiac: Normal rate and regular rhythm, S1 and S2 normal; PMI non-displaced          Abdomen: Soft, non-tender, non-distended, no masses or organomegaly      Extremities:   LUE AVG with palpable thrill, pulsatile, 2+ distal radial pulse;       RLE with hyperpigmentation, induration from distal calf to proximal foot, 2 x 2 cm superficial venous ulceration with mild serous exudate seen on wound care imaging          LAB RESULTS:  Lab Results   Component Value Date    WBC 11.83 04/01/2019    HGB 10.9 (L) 04/01/2019    HCT 41 04/10/2019    MCV 91 04/01/2019     04/01/2019     BMP  Lab Results   Component Value Date     04/10/2019    K 5.8 (H) 04/10/2019    CL 98 04/10/2019    CO2 23 04/10/2019    BUN 20 04/10/2019    CREATININE 5.8 (H) 04/10/2019    CALCIUM 8.1 (L) 04/10/2019    ANIONGAP 16 04/10/2019    ESTGFRAFRICA 8.6 (A) 04/10/2019    EGFRNONAA 7.5 (A) 04/10/2019     Lab Results   Component Value Date    HGBA1C 6.2 (H) 03/10/2019       IMAGING:    Arterial Anastomosis= 232cm/s  Proximal Graft= 507cm/s  Mid Graft= 206cm/s  Mid/Distal Graft= 413cm/s  Distal Graft= 135cm/s  Venous Anastomosis= 101cm/s  Venous Outflow=  147cm/s    Duplex ultrasound of the left upper hemodialysis graft reveals a 50% stenosis at the proximal graft level. There is a second 50% stenosis at the mid to distal graft level.  The venous anastomosis and outflow stent is patent without stenosis. The volume flow is 1640mL/min.    IMP/PLAN:  55 y.o. female with HTN, HLD, CHF (EF 20%), COPD on home O2, paroxysmal Afib (on Coumadin), seizure disorder, ESRD on HD TThS via LUE AVG.  Recent challenges with proximal access, duplex shows new stenosis, will require fistulagram with retrograde access from distal portion of graft vs L transradial.      1) LUE fistulagram (poss transradial) next week  2) continue coumadin, statin, lou Villafana MD  Vascular [facility] Endovascular Surgery

## 2019-05-07 NOTE — PROGRESS NOTES
Subjective:      Patient ID: Sisi Medel is a 58 y.o. female.    Chief Complaint: Diabetes Mellitus (03/01/2019 dr jill eduardo) and Diabetic Foot Exam    Pt here today for multiple wounds on lower extremities. Denies any NVFCSOB chest or calf pain. Pt has a nurse changing dressings 3 times a week.     Past Medical History:   Diagnosis Date    Anemia in ESRD (end-stage renal disease) 3/7/2016    Anticoagulant long-term use     Cervical radiculopathy 7/20/2015    CHF (congestive heart failure)     Chronic combined systolic and diastolic heart failure 6/14/2013    EF 20% 2013, improved with Medical therapy, negative PET 2013    Chronic respiratory failure with hypoxia 04/22/2013    On home oxygen at 2-5 liters    Closed fracture of proximal end of right fibula 6/27/2016    Complications due to renal dialysis device, implant, and graft     DDD (degenerative disc disease), lumbar 6/17/2015    Dependence on renal dialysis     Diabetes mellitus type II, controlled 12/8/2017    ESRD on hemodialysis 2/23/2016    Essential hypertension     Gout     Lateral meniscal tear 5/31/2016    Lumbar stenosis 6/17/2015    Obesity, Class III, BMI 40-49.9 (morbid obesity)     Pacemaker     Paroxysmal atrial fibrillation 5/16/2014    Not on anticoagulation    Peripheral neuropathy 11/13/2013    Personal history of gastric ulcer 3/19/2013    Pneumonia of left lower lobe due to infectious organism 3/10/2019    Sarcoidosis, lung 2009    Diagnosed in 2009 with ocular manifestation, on 4L home O2 and PO steroids    Secondary pulmonary hypertension 4/7/2015    Seizure disorder 3/19/2013    Shingles 11/13/2013    Thyroid disease            Current Outpatient Medications on File Prior to Visit   Medication Sig Dispense Refill    aspirin (ECOTRIN) 81 MG EC tablet Take 81 mg by mouth once daily.      atorvastatin (LIPITOR) 80 MG tablet TAKE 1 TABLET BY MOUTH EVERY EVENING 90 tablet 1    blood sugar  diagnostic Strp 1 each by Misc.(Non-Drug; Combo Route) route once daily. (Patient taking differently: 1 each by Misc.(Non-Drug; Combo Route) route 2 (two) times daily. ) 100 each 3    cinacalcet (SENSIPAR) 30 MG Tab Take 30 mg by mouth daily with breakfast.      clopidogrel (PLAVIX) 75 mg tablet TAKE 1 TABLET(75 MG) BY MOUTH EVERY DAY 90 tablet 0    fludrocortisone (FLORINEF) 0.1 mg Tab Take 1 tablet (100 mcg total) by mouth 2 (two) times daily. 60 tablet 5    gabapentin (NEURONTIN) 600 MG tablet Take 600 mg by mouth 2 (two) times daily.       lancets Misc 1 each by Misc.(Non-Drug; Combo Route) route once daily. (Patient taking differently: 1 each by Misc.(Non-Drug; Combo Route) route 2 (two) times daily. ) 100 each 3    levETIRAcetam (KEPPRA) 500 MG Tab TAKE 1 TABLET BY MOUTH TWICE DAILY 180 tablet 3    midodrine (PROAMATINE) 10 MG tablet Take 1 tablet (10 mg total) by mouth 3 (three) times daily. 90 tablet 11    nortriptyline (PAMELOR) 25 MG capsule Take 1 capsule by mouth nightly  1    omeprazole (PRILOSEC) 20 MG capsule TAKE 1 CAPSULE BY MOUTH EVERY DAY 90 capsule 4    patiromer calcium sorbitex (VELTASSA) 16.8 gram PwPk Take 33.6 g by mouth once daily.       prednisoLONE acetate (PRED FORTE) 1 % DrpS INSTILL ONE DROP INTO  LEFT EYE THREE TIMES A DAY  3    predniSONE (DELTASONE) 20 MG tablet Take 0.5 tablets (10 mg total) by mouth once daily. 30 tablet 2    pregabalin (LYRICA) 100 MG capsule Take 1 capsule (100 mg total) by mouth 3 (three) times daily. 270 capsule 1    DENISE-LINDA 0.8 mg Tab TAKE 1 TABLET BY MOUTH ONCE DAILY  0    RENVELA 800 mg Tab TAKE 1 TABLET BY MOUTH THREE TIMES DAILY WITH MEALS 90 tablet 0    tiZANidine (ZANAFLEX) 4 MG tablet TAKE 1 TABLET BY MOUTH EVERY DAY AS NEEDED FOR MUSCLE SPASMS 90 tablet 0    TRADJENTA 5 mg Tab tablet TAKE 1 TABLET(5 MG) BY MOUTH EVERY DAY 90 tablet 1    warfarin (COUMADIN) 5 MG tablet TAKE 1 TABLET BY MOUTH EVERY DAY EXCEPT TH 1AND 1/2 TABLETS ON  MONDAY AND FRIDAY OR AS DIRECTED (Patient taking differently: TAKE 1 TABLET BY MOUTH EVERY DAY OR AS DIRECTED) 120 tablet 0     No current facility-administered medications on file prior to visit.            Review of patient's allergies indicates:   Allergen Reactions    Bactrim [sulfamethoxazole-trimethoprim] Other (See Comments)     Causes renal failure    Nsaids (non-steroidal anti-inflammatory drug) Other (See Comments)     Renal failure           Social History     Socioeconomic History    Marital status: Single     Spouse name: Not on file    Number of children: Not on file    Years of education: Not on file    Highest education level: Not on file   Occupational History    Not on file   Social Needs    Financial resource strain: Not on file    Food insecurity:     Worry: Not on file     Inability: Not on file    Transportation needs:     Medical: Not on file     Non-medical: Not on file   Tobacco Use    Smoking status: Former Smoker     Packs/day: 0.50     Years: 10.00     Pack years: 5.00     Types: Cigarettes     Last attempt to quit: 1994     Years since quittin.0    Smokeless tobacco: Never Used   Substance and Sexual Activity    Alcohol use: No     Alcohol/week: 0.0 oz     Comment: rarely    Drug use: No    Sexual activity: Not Currently   Lifestyle    Physical activity:     Days per week: Not on file     Minutes per session: Not on file    Stress: Not on file   Relationships    Social connections:     Talks on phone: Not on file     Gets together: Not on file     Attends Congregation service: Not on file     Active member of club or organization: Not on file     Attends meetings of clubs or organizations: Not on file     Relationship status: Not on file   Other Topics Concern    Are you pregnant or think you may be? No    Breast-feeding No   Social History Narrative    Lives with boyfriend/partner who helps with her care.            Review of Systems   Constitution: Negative  "for chills, diaphoresis, fever, malaise/fatigue and night sweats.   Cardiovascular: Positive for leg swelling. Negative for claudication and cyanosis.   Skin: Positive for color change, dry skin, nail changes, poor wound healing and unusual hair distribution.   Musculoskeletal:        Abnormal gait, uses rolling walker.    Neurological: Positive for numbness and sensory change. Negative for paresthesias, tremors and weakness.           Objective:        Vitals:    05/01/19 1331   BP: 97/63   Pulse: 68   Weight: 116.6 kg (257 lb)   Height: 5' 9.6" (1.768 m)           Physical Exam   Cardiovascular:   Pulses:       Dorsalis pedis pulses are 0 on the right side, and 0 on the left side.        Posterior tibial pulses are 1+ on the right side, and 1+ on the left side.   Diffuse non pitting edema b/L     Musculoskeletal:    Amputations noted to right hallux and right 2nd digit   Feet:   Right Foot:   Protective Sensation: 5 sites tested. 0 sites sensed.   Left Foot:   Protective Sensation: 5 sites tested. 0 sites sensed.   Lymphadenopathy:   Negative lymphadenopathy bilateral popliteal fossa and tarsal tunnel.  Negative lymphangitic streaking bilateral foot/ankle bilateral.     Neurological:   Absent/loss of protective sensation all toes bilateral to 10 gram monofilament.     Skin: Lesion noted.   Nails x8 are short and dystrophic    Ulceration  Location: right lateral leg  Measurements: 1.0x 1.6x 0.3cm  Drainage: serous  Wound base: fibrogranular  SOI: none    Ulceration  Location: left medial hallux  Measurements: 1.0x 1.0x 0.1cm  Drainage: serous  Wound base: fibrogranlar  SOI: none    Ulceration  Location: right sub 2nd met  Measurements: 1.5x 1.0x 0.2cm  Drainage: serous  Wound base: fibrogranular  SOI: none                               Assessment:       Encounter Diagnoses   Name Primary?    Ulcer of right lower extremity with fat layer exposed Yes    Skin ulcer of toe of left foot, limited to breakdown of skin  "    Polyneuropathy associated with underlying disease          Plan:       Sisi was seen today for diabetes mellitus and diabetic foot exam.    Diagnoses and all orders for this visit:    Ulcer of right lower extremity with fat layer exposed  -     SUBSEQUENT HOME HEALTH ORDERS    Skin ulcer of toe of left foot, limited to breakdown of skin    Polyneuropathy associated with underlying disease      I counseled the patient on her conditions, their implications and medical management.    Ulceration on right foot and leg debrided through sub-q tissue using an tissue nipper. Ulceration debrided down to healthy tissue. Pt tolerated debridement well.    orders updated  Football dressing applied to pts b/L foot and wrapping to right leg,  by Sadie Chauhan MA under my direct supervision. Pt tolerated dressing well.   RTC in 1 week or sooner if any new pedal problems arise, any signs of infection occur, or if condition worsens.

## 2019-05-08 PROBLEM — T82.49XA CLOTTED DIALYSIS ACCESS: Status: ACTIVE | Noted: 2019-01-01

## 2019-05-08 NOTE — ANESTHESIA PREPROCEDURE EVALUATION
Pre-operative evaluation for Procedure(s) (LRB):  Fistulogram  with possible declot (Left)    Sisi Medel is a 58 y.o. female ESRD (T/Th/Sat), HFrEF(35%), pt with pace maker, pulmonary sarcoidosis, afib, copd on home 02, seizure disorder, DM2.      Per nursing - spoke with cardiology and nothing to do with the pacemaker.     Patient Active Problem List   Diagnosis    Pulmonary sarcoidosis    Seizure disorder    Morbid obesity with BMI of 40.0-44.9, adult    Pulmonary hypertension    Hyperlipidemia    Chronic combined systolic and diastolic heart failure    Chronic gout due to renal impairment without tophus    Arteriovenous fistula stenosis    Chronic respiratory failure with hypoxia    Current chronic use of systemic steroids    Hypoalbuminemia    GERD (gastroesophageal reflux disease)    Secondary adrenal insufficiency    Vitamin D insufficiency    Weakness generalized    CRLD (chronic restrictive lung disease)    Polyneuropathy associated with underlying disease    PHN (postherpetic neuralgia)    Osteopenia determined by x-ray    Permanent atrial fibrillation    Biventricular cardiac pacemaker in situ    Arteriovenous fistula occlusion    AV graft stenosis    End stage renal failure on dialysis    Complication of arteriovenous dialysis fistula    Episode of transient neurologic symptoms    Multinodular thyroid    Diabetes mellitus type II, controlled    Hyperkalemia    AV fistula thrombosis    Muscle weakness    Gait instability    AV graft malfunction    Cardiomyopathy    Elevated alkaline phosphatase level    Onychomycosis    Ulcer of right lower extremity with fat layer exposed    Arteriovenous graft stenosis    Severe obesity (BMI 35.0-35.9 with comorbidity)    Folliculitis    Skin ulcer    Healthcare-associated pneumonia    Acute on chronic combined systolic (congestive) and diastolic (congestive) heart failure    ESRD on hemodialysis     Orthostatic hypotension    Idiopathic hypotension    Lactic acidemia    AV fistula thrombosis, subsequent encounter    Hypocalcemia    Hyponatremia    Ulcer of lower limb    ESRD (end stage renal disease)    Skin ulcer of toe of left foot, limited to breakdown of skin    Clotted dialysis access       Review of patient's allergies indicates:   Allergen Reactions    Bactrim [sulfamethoxazole-trimethoprim] Other (See Comments)     Causes renal failure    Nsaids (non-steroidal anti-inflammatory drug) Other (See Comments)     Renal failure        Current Facility-Administered Medications on File Prior to Visit   Medication Dose Route Frequency Provider Last Rate Last Dose    ceFAZolin injection 2 g  2 g Intravenous On Call Procedure Earlene Russell MD        lidocaine (PF) 10 mg/ml (1%) injection 10 mg  1 mL Intradermal Once Earlene Russell MD        lidocaine (PF) 10 mg/ml (1%) injection 10 mg  1 mL Intradermal Once Wendy Simmons MD        mupirocin 2 % ointment   Nasal On Call Procedure Wendy Simmons MD        sodium chloride 0.9% flush 10 mL  10 mL Intravenous PRN Earlene Russell MD        sodium chloride 0.9% flush 10 mL  10 mL Intravenous PRN Wendy Simmons MD         Current Outpatient Medications on File Prior to Visit   Medication Sig Dispense Refill    aspirin (ECOTRIN) 81 MG EC tablet Take 81 mg by mouth once daily.      atorvastatin (LIPITOR) 80 MG tablet TAKE 1 TABLET BY MOUTH EVERY EVENING 90 tablet 1    blood sugar diagnostic Strp 1 each by Misc.(Non-Drug; Combo Route) route once daily. (Patient taking differently: 1 each by Misc.(Non-Drug; Combo Route) route 2 (two) times daily. ) 100 each 3    cinacalcet (SENSIPAR) 30 MG Tab Take 30 mg by mouth daily with breakfast.      clopidogrel (PLAVIX) 75 mg tablet TAKE 1 TABLET(75 MG) BY MOUTH EVERY DAY 90 tablet 0    fludrocortisone (FLORINEF) 0.1 mg Tab Take 1 tablet (100 mcg total) by mouth 2  (two) times daily. 60 tablet 5    gabapentin (NEURONTIN) 600 MG tablet Take 600 mg by mouth 2 (two) times daily.       lancets Misc 1 each by Misc.(Non-Drug; Combo Route) route once daily. (Patient taking differently: 1 each by Misc.(Non-Drug; Combo Route) route 2 (two) times daily. ) 100 each 3    levETIRAcetam (KEPPRA) 500 MG Tab TAKE 1 TABLET BY MOUTH TWICE DAILY 180 tablet 3    midodrine (PROAMATINE) 10 MG tablet Take 1 tablet (10 mg total) by mouth 3 (three) times daily. 90 tablet 11    nortriptyline (PAMELOR) 25 MG capsule Take 1 capsule by mouth nightly  1    omeprazole (PRILOSEC) 20 MG capsule TAKE 1 CAPSULE BY MOUTH EVERY DAY 90 capsule 4    patiromer calcium sorbitex (VELTASSA) 16.8 gram PwPk Take 33.6 g by mouth once daily.       prednisoLONE acetate (PRED FORTE) 1 % DrpS INSTILL ONE DROP INTO  LEFT EYE THREE TIMES A DAY  3    predniSONE (DELTASONE) 20 MG tablet Take 0.5 tablets (10 mg total) by mouth once daily. 30 tablet 2    pregabalin (LYRICA) 100 MG capsule Take 1 capsule (100 mg total) by mouth 3 (three) times daily. 270 capsule 1    DENISE-LINDA 0.8 mg Tab TAKE 1 TABLET BY MOUTH ONCE DAILY  0    RENVELA 800 mg Tab TAKE 1 TABLET BY MOUTH THREE TIMES DAILY WITH MEALS 90 tablet 0    tiZANidine (ZANAFLEX) 4 MG tablet TAKE 1 TABLET BY MOUTH EVERY DAY AS NEEDED FOR MUSCLE SPASMS 90 tablet 0    TRADJENTA 5 mg Tab tablet TAKE 1 TABLET(5 MG) BY MOUTH EVERY DAY 90 tablet 1    warfarin (COUMADIN) 5 MG tablet TAKE 1 TABLET BY MOUTH EVERY DAY EXCEPT TH 1AND 1/2 TABLETS ON MONDAY AND FRIDAY OR AS DIRECTED (Patient taking differently: TAKE 1 TABLET BY MOUTH EVERY DAY OR AS DIRECTED) 120 tablet 0       Past Surgical History:   Procedure Laterality Date    ABDOMINAL SURGERY      ABLATION N/A 6/12/2017    Performed by Bladimir Adorno MD at [facility] CATH LAB    ANGIOPLASTY Left 1/10/2018    Performed by Torrey Villafana MD at [facility] OR 2ND FLR    ANGIOPLASTY-PERCUTANEOUS TRANSLUMINAL (PTA) Left  2017    Performed by [facility] at [facility] OR 2ND FLR    ANGIOPLASTY-PERCUTANEOUS TRANSLUMINAL (PTA) Left 10/12/2016    Performed by [facility]rey [facility]afana MD at [facility] OR 2ND FLR    CARDIAC PACEMAKER PLACEMENT       SECTION      x2    DECLOT GRAFT PERCUTANEOUS Left 2017    Performed by Torrey Villafana MD at [facility] OR 2ND FLR    DECLOT-GRAFT Left 2016    Performed by Harjit Starr MD at [facility] CATH LAB    DECLOT-GRAFT Left 2016    Performed by [facility]jit Starr MD at [facility] CATH LAB    ECHOCARDIOGRAM-TRANSESOPHAGEAL N/A 2013    Performed by Delmy Surgeon at [facility] DELMY    Fistulogram Left 2019    Performed by [facility]ana MD at [facility] CATH LAB    fistulogram Left 1/10/2018    Performed by [facility] Vill[facility]ana MD at [facility] OR 2ND FLR    FISTULOGRAM Left 2017    Performed by [facility]ana MD at [facility] CATH LAB    FISTULOGRAM Left 10/12/2016    Performed by [facility]ana MD at [facility] OR 2ND FLR    FISTULOGRAM Left 2016    Performed by Harjit Starr MD at [facility] CATH LAB    Fistulogram, LUE AVG, possible intervention Left 2019    Performed by [facility]ana MD at [facility] CATH LAB    INJECTION-STEROID-EPIDURAL-TRANSFORAMINAL Right 2015    Performed by Pat[facility]rez [facility] at [facility] PAIN MGT    INJECTION-STEROID-EPIDURAL-TRANSFORAMINAL Right 2015    Performed by Pat[facility]rez MD at [facility] PAIN MGT    SDZVIYFDR-RWDGD-LUCOKCVTEZDEQ / Left upper AV graft. Left 2/3/2016    Performed by [facility]ana MD at [facility] OR 2ND FLR    XTCCLAXRA-VWCGDDDXC-TWCRADZOCURQG N/A 2017    Performed by Bladimir Adorno MD at [facility] CATH LAB    OTHER SURGICAL HISTORY      loop recorder implant    PLACEMENT-STENT Left 2017    Performed by [facility]afana MD at [facility] OR 2ND FLR    PTA, AV FISTULA N/A 2019    Performed by [facility]ana MD at [facility] CATH LAB    stent to fistula      TRANSESOPHAGEAL ECHOCARDIOGRAM (JARAD) N/A  2016    Performed by [facility]rav Machuca [facility] at [facility] CATH LAB    ULTRASOUND, UPPER GI TRACT, ENDOSCOPIC N/A 2014    Performed by Stewart Burgess MD at [facility] ENDO (2ND FLR)    VASCULAR SURGERY      fistula to L upper arm        Social History     Socioeconomic History    Marital status: Single     Spouse name: Not on file    Number of children: Not on file    Years of education: Not on file    Highest education level: Not on file   Occupational History    Not on file   Social Needs    Financial resource strain: Not on file    Food insecurity:     Worry: Not on file     Inability: Not on file    Transportation needs:     Medical: Not on file     Non-medical: Not on file   Tobacco Use    Smoking status: Former Smoker     Packs/day: 0.50     Years: 10.00     Pack years: 5.00     Types: Cigarettes     Last attempt to quit: 1994     Years since quittin.0    Smokeless tobacco: Never Used   Substance and Sexual Activity    Alcohol use: No     Alcohol/week: 0.0 oz     Comment: rarely    Drug use: No    Sexual activity: Not Currently   Lifestyle    Physical activity:     Days per week: Not on file     Minutes per session: Not on file    Stress: Not on file   Relationships    Social connections:     Talks on phone: Not on file     Gets together: Not on file     Attends Taoist service: Not on file     Active member of club or organization: Not on file     Attends meetings of clubs or organizations: Not on file     Relationship status: Not on file   Other Topics Concern    Are you pregnant or think you may be? No    Breast-feeding No   Social History Narrative    Lives with boyfriend/partner who helps with her care.          Vital Signs Range (Last 24H):  Temp:  [36.8 °C (98.3 °F)]   Pulse:  [64]   Resp:  [19]   BP: (107)/(71)   SpO2:  [99 %]       CBC:  Lab Results   Component Value Date    WBC 11.83 2019    HGB 10.9 (L) 2019    HCT 41 04/10/2019    MCV 91 2019    PLT  171 04/01/2019       CMP:   CMP  Sodium   Date Value Ref Range Status   04/10/2019 137 136 - 145 mmol/L Final     Potassium   Date Value Ref Range Status   04/10/2019 5.8 (H) 3.5 - 5.1 mmol/L Final     Chloride   Date Value Ref Range Status   04/10/2019 98 95 - 110 mmol/L Final     CO2   Date Value Ref Range Status   04/10/2019 23 23 - 29 mmol/L Final     Glucose   Date Value Ref Range Status   04/10/2019 164 (H) 70 - 110 mg/dL Final     BUN, Bld   Date Value Ref Range Status   04/10/2019 20 6 - 20 mg/dL Final     Creatinine   Date Value Ref Range Status   04/10/2019 5.8 (H) 0.5 - 1.4 mg/dL Final     Calcium   Date Value Ref Range Status   04/10/2019 8.1 (L) 8.7 - 10.5 mg/dL Final     Total Protein   Date Value Ref Range Status   04/10/2019 7.3 6.0 - 8.4 g/dL Final     Albumin   Date Value Ref Range Status   04/10/2019 3.3 (L) 3.5 - 5.2 g/dL Final     Total Bilirubin   Date Value Ref Range Status   04/10/2019 1.0 0.1 - 1.0 mg/dL Final     Comment:     For infants and newborns, interpretation of results should be based  on gestational age, weight and in agreement with clinical  observations.  Premature Infant recommended reference ranges:  Up to 24 hours.............<8.0 mg/dL  Up to 48 hours............<12.0 mg/dL  3-5 days..................<15.0 mg/dL  6-29 days.................<15.0 mg/dL       Alkaline Phosphatase   Date Value Ref Range Status   04/10/2019 93 55 - 135 U/L Final     AST   Date Value Ref Range Status   04/10/2019 41 (H) 10 - 40 U/L Final     ALT   Date Value Ref Range Status   04/10/2019 22 10 - 44 U/L Final     Anion Gap   Date Value Ref Range Status   04/10/2019 16 8 - 16 mmol/L Final     eGFR if    Date Value Ref Range Status   04/10/2019 8.6 (A) >60 mL/min/1.73 m^2 Final     eGFR if non    Date Value Ref Range Status   04/10/2019 7.5 (A) >60 mL/min/1.73 m^2 Final     Comment:     Calculation used to obtain the estimated glomerular filtration  rate (eGFR) is the  CKD-EPI equation.          INR  No results for input(s): PT, INR, PROTIME, APTT in the last 72 hours.        Diagnostic Studies:      2D Echo:    CONCLUSIONS     1 - Moderately depressed left ventricular systolic function (EF 30-35%).     2 - Eccentric hypertrophy.     3 - Biatrial enlargement.     4 - Normal right ventricular systolic function .     5 - The estimated PA systolic pressure is greater than 20 mmHg.             This document has been electronically    SIGNED BY: Haleigh Aguirre MD On: 12/08/2017 15:59    Pre-op Assessment    I have reviewed the Patient Summary Reports.      I have reviewed the Medications.     Review of Systems  Anesthesia Hx:  No problems with previous Anesthesia Denies Hx of Anesthetic complications  History of prior surgery of interest to airway management or planning:  Denies Personal Hx of Anesthesia complications.   Social:  Non-Smoker, No Alcohol Use    Hematology/Oncology:  Hematology Normal   Oncology Normal     EENT/Dental:EENT/Dental Normal   Cardiovascular:   Exercise tolerance: good Hypertension Dysrhythmias atrial fibrillation CHF (improved, EF recovered to ~50%, diastolic dys. Mostly resolved pHTN) HYATT NYHA Classification III ECG has been reviewed.    Pulmonary:   COPD (sarcoidosis - 2L continuous O2. Currently not SOB, able to do errands), severe Shortness of breath Denies Recent URI.    Renal/:   Chronic Renal Disease (yesterday - denies SOB/fatigue/swelling), ESRD, Dialysis Increased AVG velocities   Hepatic/GI:   GERD, well controlled    Musculoskeletal:   Arthritis     Neurological:   Seizures, well controlled   Peripheral Neuropathy    Endocrine:   Diabetes    Dermatological:  Skin Normal    Psych:  Psychiatric Normal           Physical Exam  General:  Obesity    Airway/Jaw/Neck:  Airway Findings: Mouth Opening: Normal Tongue: Normal  General Airway Assessment: Adult  Mallampati: II  TM Distance: Normal, at least 6 cm  Jaw/Neck Findings:  Neck ROM: Normal ROM       Dental:  Dental Findings: In tact   Chest/Lungs:  Chest/Lungs Findings: Normal Respiratory Rate     Heart/Vascular:  Heart Findings: Rate: Normal        Mental Status:  Mental Status Findings:  Cooperative, Alert and Oriented         Anesthesia Plan  Type of Anesthesia, risks & benefits discussed:  Anesthesia Type:  MAC, general  Patient's Preference:   Intra-op Monitoring Plan: standard ASA monitors  Intra-op Monitoring Plan Comments:   Post Op Pain Control Plan: multimodal analgesia  Post Op Pain Control Plan Comments:   Induction:   IV  Beta Blocker:  Patient is not currently on a Beta-Blocker (No further documentation required).       Informed Consent: Patient understands risks and agrees with Anesthesia plan.  Questions answered. Anesthesia consent signed with patient.  ASA Score: 4     Day of Surgery Review of History & Physical:    H&P update referred to the surgeon.         Ready For Surgery From Anesthesia Perspective.

## 2019-05-08 NOTE — BRIEF OP NOTE
[facility]  Brief Operative Note     SUMMARY     Surgery Date: 5/8/2019     Surgeon(s) and Role:     * Torrey Villafana MD - Primary     * Yonatan Guerra MD - Fellow - Assisting     * Cirilo Moreno MD - Resident - Assisting      Pre-op Diagnosis:    AV graft stenosis, subsequent encounter    Post-op Diagnosis:  Post-Op Diagnosis Codes:  same    Procedure(s) (LRB):  1. PTA arterial anastomosis (6x20mm [place] balloon)  2. PTA proximal and mid AV graft (7x40mm [place] ballloon)  3. Transradial left arm AV graft fistulagram  4. Ultrasound guided access of left radial artery    Anesthesia: Local MAC    Findings/Key Components:   1. Susan-ansatomotic stenosis of AV graft ~50% (<20% residual after)  2. Proximal and mid AV graft stenosis ~70% (no residual after)  3. Patent outflow venous stents    Estimated Blood Loss: * No values recorded between 5/8/2019 10:53 AM and 5/8/2019 11:30 AM *         Specimens:   Specimen (12h ago, onward)    None          Discharge Note    SUMMARY     Admit Date: 5/8/2019    Discharge Date and Time:  05/08/2019 11:34 AM    Hospital Course (synopsis of major diagnoses, care, treatment, and services provided during the course of the hospital stay): Successful transradial left arm AVG fistulagram     Final Diagnosis: Post-Op Diagnosis Codes:  AV graft stenosis    Disposition: Home or Self Care    Follow Up/Patient Instructions:     Medications:  Reconciled Home Medications:      Medication List      ASK your doctor about these medications    aspirin 81 MG EC tablet  Commonly known as:  ECOTRIN  Take 81 mg by mouth once daily.     atorvastatin 80 MG tablet  Commonly known as:  LIPITOR  TAKE 1 TABLET BY MOUTH EVERY EVENING     blood sugar diagnostic Strp  1 each by Misc.(Non-Drug; Combo Route) route once daily.     cinacalcet 30 MG Tab  Commonly known as:  SENSIPAR  Take 30 mg by mouth daily with breakfast.     clopidogrel 75 mg tablet  Commonly known as:  PLAVIX  TAKE 1  TABLET(75 MG) BY MOUTH EVERY DAY     fludrocortisone 0.1 mg Tab  Commonly known as:  FLORINEF  Take 1 tablet (100 mcg total) by mouth 2 (two) times daily.     gabapentin 600 MG tablet  Commonly known as:  NEURONTIN  Take 600 mg by mouth 2 (two) times daily.     lancets Misc  1 each by Misc.(Non-Drug; Combo Route) route once daily.     levETIRAcetam 500 MG Tab  Commonly known as:  KEPPRA  TAKE 1 TABLET BY MOUTH TWICE DAILY     midodrine 10 MG tablet  Commonly known as:  PROAMATINE  Take 1 tablet (10 mg total) by mouth 3 (three) times daily.     nortriptyline 25 MG capsule  Commonly known as:  PAMELOR  Take 1 capsule by mouth nightly     omeprazole 20 MG capsule  Commonly known as:  PRILOSEC  TAKE 1 CAPSULE BY MOUTH EVERY DAY     patiromer calcium sorbitex 16.8 gram Pwpk  Commonly known as:  VELTASSA  Take 33.6 g by mouth once daily.     prednisoLONE acetate 1 % Drps  Commonly known as:  PRED FORTE  INSTILL ONE DROP INTO  LEFT EYE THREE TIMES A DAY     predniSONE 20 MG tablet  Commonly known as:  DELTASONE  Take 0.5 tablets (10 mg total) by mouth once daily.     pregabalin 100 MG capsule  Commonly known as:  LYRICA  Take 1 capsule (100 mg total) by mouth 3 (three) times daily.     DENISE-LINDA 0.8 mg Tab  Generic drug:  B complex-vitamin C-folic acid  TAKE 1 TABLET BY MOUTH ONCE DAILY     RENVELA 800 mg Tab  Generic drug:  sevelamer carbonate  TAKE 1 TABLET BY MOUTH THREE TIMES DAILY WITH MEALS     tiZANidine 4 MG tablet  Commonly known as:  ZANAFLEX  TAKE 1 TABLET BY MOUTH EVERY DAY AS NEEDED FOR MUSCLE SPASMS     TRADJENTA 5 mg Tab tablet  Generic drug:  linagliptin  TAKE 1 TABLET(5 MG) BY MOUTH EVERY DAY     warfarin 5 MG tablet  Commonly known as:  COUMADIN  TAKE 1 TABLET BY MOUTH EVERY DAY EXCEPT TH 1AND 1/2 TABLETS ON MONDAY AND FRIDAY OR AS DIRECTED          No discharge procedures on file.

## 2019-05-08 NOTE — TRANSFER OF CARE
"Anesthesia Transfer of Care Note    Patient: Sisi Medel    Procedure(s) Performed: Procedure(s) (LRB):  Fistulogram, OR 11 (Left)    Patient location: PACU    Anesthesia Type: MAC    Transport from OR: Transported from OR on 6-10 L/min O2 by face mask with adequate spontaneous ventilation    Post pain: adequate analgesia    Post assessment: no apparent anesthetic complications    Post vital signs: stable    Level of consciousness: awake and alert    Nausea/Vomiting: no nausea/vomiting    Complications: none    Transfer of care protocol was followed      Last vitals:   Visit Vitals  /71 (BP Location: Left arm, Patient Position: Lying)   Pulse 64   Temp 36.8 °C (98.3 °F) (Oral)   Resp 19   Ht 5' 8" (1.727 m)   Wt 118.4 kg (261 lb)   LMP  (LMP Unknown)   SpO2 99%   Breastfeeding? No   BMI 39.68 kg/m²     "

## 2019-05-08 NOTE — PROGRESS NOTES
Pt discharged to home.  Discharge instructions given, pt and friend stated understanding.  Dressing to left wrist dry and intact, IV removed.  Pt stated that she was driving home today and friend does not drive.  Educated pt on the importance of not driving after anesthesia. Dr. Guerra and Dr. Pickard were notified pt driving home.  Pt agreed to sign AMA paperwork before leaving.

## 2019-05-08 NOTE — PROGRESS NOTES
Clothes, walker, oxygen tank placed in locker in post op. Cell phone and keys sent with significant other.

## 2019-05-08 NOTE — OP NOTE
Date: 05/08/2019     Surgeon(s) and Role:     * Torrey Villafana MD - Primary     * Yonatan Guerra MD - Fellow - Assisting     * Cirilo Moreno MD - Resident - Assisting        Pre-op Diagnosis:    AV graft stenosis, subsequent encounter     Post-op Diagnosis:  Post-Op Diagnosis Codes:  same     Procedure(s) (LRB):  1. PTA arterial anastomosis (6x20mm [place] balloon)  2. PTA proximal and mid AV graft (7x40mm [place] ballloon)  3. Transradial left arm AV graft fistulagram  4. Ultrasound guided access of left radial artery     Anesthesia: Local MAC     Findings/Key Components:   1. Susan-ansatomotic stenosis of AV graft ~50% (<20% residual after)  2. Proximal and mid AV graft stenosis ~70% (no residual after)  3. Patent outflow venous stents    EBL: Minimal    Anesthesia: Local MAC    Findings: Resolution of stenosis; palpable thrill           Complications:  None; patient tolerated the procedure well.           Disposition: Recovery- hemodynamically stable in good condition    Procedure detail: The patient was brought to the interventional suite, placed in supine.  Left arm was prepped and draped in the standard surgical fashion. Under ultrasound guidance, the radial artery was accessed with a micropuncture needle; through this, an 0.018-inch mandril wire was placed then a 6-[place] transradial sheath. Transradial fistulagram was then performed, which revealed: Susan-ansatomotic stenosis of AV graft ~50%, Proximal and mid AV graft stenosis ~70%, Patent outflow venous stents. Through this, an 0.035-inch Glidewire was placed through the high-grade stenosis, which was demonstrated by the angiogram.  Susan anastomotic stenosis was 1st treated with 5 x 20mm [place]ang balloon and then with 6 x 20mm mustang balloon.  Excellent result was noted with less than 20% residual stenosis post treatment.  Then proximal and mid AV graft stenosis was treated with 7 x 40 mm [place] balloon.  Excellent result was noted with no residual  stenosis after treatment.  Strong thrill could be felt. The sheath was removed and a trans-radial artery band placed with good hemostasis; hand was well-perfused and thrill palpable.    Patient tolerated the procedure well and was transferred to PACU for recovery.    Yonatan Guerra MD RPVI  Fellow, Vascular/Endovascular Surgery

## 2019-05-08 NOTE — ANESTHESIA POSTPROCEDURE EVALUATION
Anesthesia Post Evaluation    Patient: Sisi Medel    Procedure(s) Performed: Procedure(s) (LRB):  Fistulogram, OR 11 (Left)    Final Anesthesia Type: MAC  Patient location during evaluation: PACU  Patient participation: Yes- Able to Participate  Level of consciousness: awake and alert and oriented  Post-procedure vital signs: reviewed and stable  Pain management: adequate  Airway patency: patent  PONV status at discharge: No PONV  Anesthetic complications: no      Cardiovascular status: blood pressure returned to baseline and hemodynamically stable  Respiratory status: unassisted, spontaneous ventilation and room air  Hydration status: euvolemic  Follow-up not needed.          Vitals Value Taken Time   BP 95/64 5/8/2019  1:17 PM   Temp 36.4 °C (97.5 °F) 5/8/2019 11:48 AM   Pulse 69 5/8/2019  1:31 PM   Resp 23 5/8/2019  1:31 PM   SpO2 100 % 5/8/2019  1:31 PM   Vitals shown include unvalidated device data.      No case tracking events are documented in the log.      Pain/Leslie Score: Leslie Score: 9 (5/8/2019 11:46 AM)

## 2019-05-08 NOTE — DISCHARGE INSTRUCTIONS
The following are instructions your doctor would like you to follow regarding your activity, diet, and follow up care.    Your procedure was done by making a small puncture in your wrist. You must observe that area for bleeding and swelling once you are discharged from the hospital. Be careful not to over-stimulate the affected wrist for 24 hours.    1. Do not strenuously flex the wrist for 24 hours.  2. Occlusive bandage (Tegaderm) may be removed after 24 hours.  3. At 24 hours after your procedure, remove the Tagaderm bandage and leave puncture site open to air. If minor oozing, apply adhesive bandage (Band-Aid) and remove after 12 hours.  4. No driving for 24 hours.  5. No soaking wrist for 2 days. Gently wash site with soap and water. Dry well. Do not apply powders, lotions or ointments. No tub baths, whirlpool or swimming for 48 hours.  6. No lifting more than 3-5 pounds with affected wrist for 7 days.  7. Restart your usual diet and medications with appropriate changes as dictated by your doctor.  8. For any bleeding, hold pressure with thumb against puncture site and finger against back of wrist.  9.  If you are unable to control the bleeding, call 911.   10. Call with any concerns or any of the following:  - Swelling, redness, discharge for the site, large bruising or increasing pain, numbness or tingling at the site  - Temperature of 101 F or higher  - Shortness of breath

## 2019-05-09 PROBLEM — R06.02 SHORTNESS OF BREATH: Status: ACTIVE | Noted: 2019-01-01

## 2019-05-09 NOTE — H&P
[facility] Medicine  History & Physical    Patient Name: Sisi Medel  MRN: [id number]  Admission Date: 5/9/2019  Attending Physician: BOB Thompson MD   Primary Care Provider: Carlos A Caputo MD    [facility] Medicine Team: [facility] E DWIGHT Thompson MD     Patient information was obtained from patient, past medical records and ER records.     Subjective:     Principal Problem: Acute hypoxic respiratory failure    Chief Complaint:   Chief Complaint   Patient presents with    Shortness of Breath     post dialysis on non rebreather 10L. room air 80% now 95%        HPI: Ms. Medel is a 57yo lady well known to me from multiple past admissions.  She has multiple medical problems:  pulmonary sarcoidosis on 5 L home oxygen, Class 3 HFrEF (3/2019 EF 35%), perm Afib/flutter s/p 6/2017 AVN ablation & PPM, ESRD (TTS), Type 2 DM (3/2019 A1c 6.2%), pulmonary HTN, YOANDY, and recently diagnosed renal insufficiency secondary to chronic steroid therapy  and orthostatic hypotension.       She has recently been having issues with her left upper arm graft with the arterial access point, so Dr. Villafana performed a fistulagram with retrograde access from distal portion of graft vs L transradial area.  This was done yesterday (5/8).  I'm not able to tell in Epic what they actually found or performed as of yet on that procedure, but the patient states since then she was able to undergo HD today with no issues.    In fact, when she went to HD today, she reports feeling absolutely normal and at her baseline.  During HD she had major BP fluctuations (not terribly uncommon for her), with her SBP dropping to 80 at one point.  At the end of HD she suddenly felt weak and dizzy, then became very short of breath.  Her HD sessions are usually 4-5 hours due to her BP fluctuations.  When she became short of breath, her oxygen saturation was checked and found to be in the 70's.  EMT was called and placed  her on a NRB mask, arriving at the ED saturating 100%.  After arriving in the ED, she was immediately able to be titrated back down to her normal nasal canula at 4L saturating 94% with resolution of all of her symptoms.  Around the same time that she became short of breath, she also developed some chest pressure, but that has also now resolved.  During the HD and SOB period, she did have some prolonged coughing spells.    Past Medical History:   Diagnosis Date    Anemia in ESRD (end-stage renal disease) 3/7/2016    Anticoagulant long-term use     Cervical radiculopathy 7/20/2015    CHF (congestive heart failure)     Chronic combined systolic and diastolic heart failure 6/14/2013    EF 20% 2013, improved with Medical therapy, negative PET 2013    Chronic respiratory failure with hypoxia 04/22/2013    On home oxygen at 2-5 liters    Closed fracture of proximal end of right fibula 6/27/2016    Complications due to renal dialysis device, implant, and graft     DDD (degenerative disc disease), lumbar 6/17/2015    Dependence on renal dialysis     Diabetes mellitus type II, controlled 12/8/2017    ESRD on hemodialysis 2/23/2016    Essential hypertension     Gout     Lateral meniscal tear 5/31/2016    Lumbar stenosis 6/17/2015    Obesity, Class III, BMI 40-49.9 (morbid obesity)     Pacemaker     Paroxysmal atrial fibrillation 5/16/2014    Not on anticoagulation    Peripheral neuropathy 11/13/2013    Personal history of gastric ulcer 3/19/2013    Pneumonia of left lower lobe due to infectious organism 3/10/2019    Sarcoidosis, lung 2009    Diagnosed in 2009 with ocular manifestation, on 4L home O2 and PO steroids    Secondary pulmonary hypertension 4/7/2015    Seizure disorder 3/19/2013    Shingles 11/13/2013    Thyroid disease        Past Surgical History:   Procedure Laterality Date    ABDOMINAL SURGERY      ABLATION N/A 6/12/2017    Performed by [facility]imir [facility] at [facility] CATH LAB     ANGIOPLASTY Left 1/10/2018    Performed by Torrey [facility]afana MD at [facility] OR 2ND FLR    ANGIOPLASTY-PERCUTANEOUS TRANSLUMINAL (PTA) Left 2017    Performed by Torrey [facility]afana MD at [facility] OR 2ND FLR    ANGIOPLASTY-PERCUTANEOUS TRANSLUMINAL (PTA) Left 10/12/2016    Performed by Torrey [facility]afana MD at [facility] OR 2ND FLR    CARDIAC PACEMAKER PLACEMENT       SECTION      x2    DECLOT GRAFT PERCUTANEOUS Left 2017    Performed by Torrey [facility]afana MD at [facility] OR 2ND FLR    DECLOT-GRAFT Left 2016    Performed by Harjit Starr MD at [facility] CATH LAB    DECLOT-GRAFT Left 2016    Performed by Harjit Starr MD at [facility] CATH LAB    ECHOCARDIOGRAM-TRANSESOPHAGEAL N/A 2013    Performed by Delmy Surgeon at [facility] DELMY    Fistulogram Left 2019    Performed by [facility]ana MD at [facility] CATH LAB    fistulogram Left 1/10/2018    Performed by Torrey [facility]afana MD at [facility] OR 2ND FLR    FISTULOGRAM Left 2017    Performed by Torrey Villafana MD at [facility] CATH LAB    FISTULOGRAM Left 10/12/2016    Performed by Torrey Villafana MD at [facility] OR 2ND FLR    FISTULOGRAM Left 2016    Performed by Harjit Starr MD at [facility] CATH LAB    Fistulogram, LUE AVG, possible intervention Left 2019    Performed by Torrey Villafana MD at [facility] CATH LAB    Fistulogram, OR 11 Left 2019    Performed by Torrey Villafana MD at [facility] OR 2ND FLR    INJECTION-STEROID-EPIDURAL-TRANSFORAMINAL Right 2015    Performed by Patria Gutierrez MD at [facility] PAIN MGT    INJECTION-STEROID-EPIDURAL-TRANSFORAMINAL Right 2015    Per[facility] by [facility] at [facility] MGT    EUMOCADCE-OAOTF-[id number] / Left upper AV graft. Left 2/3/2016    Performed by Torrey [facility]afana MD at [facility] OR 2ND FLR    EAZIHDIJU-RGEHROUOV-[id number] N/A 2017    Performed by Bladimir Adorno MD at [facility] CATH LAB    OTHER SURGICAL HISTORY      loop recorder implant    PLACEMENT-STENT Left  2/7/2017    Performed by [facility] at [facility] OR 2ND FLR    PTA, AV FISTULA N/A 1/30/2019    Performed by [facility]afana MD at [facility] CATH LAB    stent to fistula      TRANSESOPHAGEAL ECHOCARDIOGRAM (JARAD) N/A 6/27/2016    Performed by Sourav Machuca MD at [facility] CATH LAB    ULTRASOUND, UPPER GI TRACT, ENDOSCOPIC N/A 1/9/2014    Performed by Stewart Burgess MD at [facility] ENDO (2ND FLR)    VASCULAR SURGERY      fistula to L upper arm        Review of patient's allergies indicates:   Allergen Reactions    Bactrim [sulfamethoxazole-trimethoprim] Other (See Comments)     Causes renal failure    Nsaids (non-steroidal anti-inflammatory drug) Other (See Comments)     Renal failure       No current facility-administered medications on file prior to encounter.      Current Outpatient Medications on File Prior to Encounter   Medication Sig    aspirin (ECOTRIN) 81 MG EC tablet Take 81 mg by mouth once daily.    atorvastatin (LIPITOR) 80 MG tablet TAKE 1 TABLET BY MOUTH EVERY EVENING    blood sugar diagnostic Strp 1 each by Misc.(Non-Drug; Combo Route) route once daily. (Patient taking differently: 1 each by Misc.(Non-Drug; Combo Route) route 2 (two) times daily. )    cinacalcet (SENSIPAR) 30 MG Tab Take 30 mg by mouth daily with breakfast.    clopidogrel (PLAVIX) 75 mg tablet TAKE 1 TABLET(75 MG) BY MOUTH EVERY DAY    fludrocortisone (FLORINEF) 0.1 mg Tab Take 1 tablet (100 mcg total) by mouth 2 (two) times daily.    gabapentin (NEURONTIN) 600 MG tablet Take 600 mg by mouth 2 (two) times daily.     lancets Misc 1 each by Misc.(Non-Drug; Combo Route) route once daily. (Patient taking differently: 1 each by Misc.(Non-Drug; Combo Route) route 2 (two) times daily. )    levETIRAcetam (KEPPRA) 500 MG Tab TAKE 1 TABLET BY MOUTH TWICE DAILY    midodrine (PROAMATINE) 10 MG tablet Take 1 tablet (10 mg total) by mouth 3 (three) times daily.    nortriptyline (PAMELOR) 25 MG capsule Take 1 capsule by mouth  nightly    omeprazole (PRILOSEC) 20 MG capsule TAKE 1 CAPSULE BY MOUTH EVERY DAY    patiromer calcium sorbitex (VELTASSA) 16.8 gram PwPk Take 33.6 g by mouth once daily.     prednisoLONE acetate (PRED FORTE) 1 % DrpS INSTILL ONE DROP INTO  LEFT EYE THREE TIMES A DAY    predniSONE (DELTASONE) 20 MG tablet Take 0.5 tablets (10 mg total) by mouth once daily.    pregabalin (LYRICA) 100 MG capsule Take 1 capsule (100 mg total) by mouth 3 (three) times daily.    DENISE-LINDA 0.8 mg Tab TAKE 1 TABLET BY MOUTH ONCE DAILY    RENVELA 800 mg Tab TAKE 1 TABLET BY MOUTH THREE TIMES DAILY WITH MEALS    tiZANidine (ZANAFLEX) 4 MG tablet TAKE 1 TABLET BY MOUTH EVERY DAY AS NEEDED FOR MUSCLE SPASMS    TRADJENTA 5 mg Tab tablet TAKE 1 TABLET(5 MG) BY MOUTH EVERY DAY    warfarin (COUMADIN) 5 MG tablet TAKE 1 TABLET BY MOUTH EVERY DAY EXCEPT TH 1AND 1/2 TABLETS ON MONDAY AND FRIDAY OR AS DIRECTED (Patient taking differently: TAKE 1 TABLET BY MOUTH EVERY DAY OR AS DIRECTED)     Family History     Problem Relation (Age of Onset)    Coronary artery disease Father    Diabetes Mother, Father, Maternal Grandmother    Hypertension Mother, Father    Kidney failure Mother    Lupus Sister        Tobacco Use    Smoking status: Former Smoker     Packs/day: 0.50     Years: 10.00     Pack years: 5.00     Types: Cigarettes     Last attempt to quit: 1994     Years since quittin.0    Smokeless tobacco: Never Used   Substance and Sexual Activity    Alcohol use: No     Alcohol/week: 0.0 oz     Comment: rarely    Drug use: No    Sexual activity: Not Currently     Review of Systems   Constitutional: Positive for activity change and fatigue. Negative for chills and fever.   HENT: Negative for congestion.    Eyes: Positive for visual disturbance.   Respiratory: Positive for cough, chest tightness and shortness of breath.    Cardiovascular: Negative for chest pain.   Gastrointestinal: Negative for abdominal pain, constipation, diarrhea,  nausea and vomiting.   Endocrine: Positive for cold intolerance.   Genitourinary: Negative for dysuria.   Musculoskeletal: Negative for back pain.   Skin: Positive for wound.   Allergic/Immunologic: Negative for immunocompromised state.   Neurological: Positive for dizziness. Negative for weakness.   Hematological: Bruises/bleeds easily.   Psychiatric/Behavioral: Negative for confusion. The patient is not nervous/anxious.      Objective:     Vital Signs (Most Recent):  Temp: 98.6 °F (37 °C) (05/09/19 1342)  Pulse: 69 (05/09/19 1631)  Resp: (!) 23 (05/09/19 1525)  BP: (!) 86/55 (05/09/19 1512)  SpO2: 95 % (05/09/19 1342) Vital Signs (24h Range):  Temp:  [98.6 °F (37 °C)] 98.6 °F (37 °C)  Pulse:  [69-80] 69  Resp:  [20-24] 23  SpO2:  [95 %] 95 %  BP: ()/(55-84) 86/55     Weight: 118.4 kg (261 lb)  Body mass index is 46.23 kg/m².    Physical Exam   Constitutional: She is oriented to person, place, and time. She appears well-developed and well-nourished. She appears listless.  Non-toxic appearance. She has a sickly appearance. She does not appear ill. No distress.   HENT:   Head: Normocephalic and atraumatic.   Mouth/Throat: No oropharyngeal exudate.   Eyes: Pupils are equal, round, and reactive to light. Conjunctivae and EOM are normal. Right eye exhibits no discharge. Left eye exhibits no discharge. No scleral icterus.   Sesser sclerae   Neck: Normal range of motion. Neck supple. No JVD present. No tracheal deviation present. No thyromegaly present.   Cardiovascular: Normal rate, regular rhythm and intact distal pulses. Exam reveals no gallop and no friction rub.   Murmur heard.   Systolic murmur is present with a grade of 2/6.  Right chest pacer in situ, no pain to palpation and no redness.  Left brachial AVG with good thrill and bruit   Pulmonary/Chest: Effort normal. No stridor. No respiratory distress. She has no decreased breath sounds. She has no wheezes. She has no rhonchi. She has rales in the right lower  field and the left lower field. She exhibits no tenderness.   Abdominal: Soft. Bowel sounds are normal. She exhibits no distension and no mass. There is tenderness in the epigastric area. There is no rebound and no guarding.   Truncal obesity   Genitourinary:   Genitourinary Comments: No ace in place   Musculoskeletal: Normal range of motion. She exhibits no edema or tenderness.   Lymphadenopathy:     She has no cervical adenopathy.   1+ edema legs   Neurological: She is oriented to person, place, and time. She appears listless. She displays normal reflexes. No cranial nerve deficit. She exhibits normal muscle tone. Coordination normal. GCS eye subscore is 4. GCS verbal subscore is 5. GCS motor subscore is 6.   Skin: Skin is warm and dry. No rash noted. She is not diaphoretic. No erythema. No pallor.   Lower legs wrapped   Psychiatric: She has a normal mood and affect. Her speech is normal and behavior is normal. Judgment and thought content normal. Cognition and memory are normal.   Nursing note and vitals reviewed.        CRANIAL NERVES     CN III, IV, VI   Pupils are equal, round, and reactive to light.  Extraocular motions are normal.       Significant Labs:   Recent Results (from the past 24 hour(s))   CBC auto differential    Collection Time: 05/09/19  2:39 PM   Result Value Ref Range    WBC 21.33 (H) 3.90 - 12.70 K/uL    RBC 5.43 (H) 4.00 - 5.40 M/uL    Hemoglobin 14.4 12.0 - 16.0 g/dL    Hematocrit 47.3 37.0 - 48.5 %    Mean Corpuscular Volume 87 82 - 98 fL    Mean Corpuscular Hemoglobin 26.5 (L) 27.0 - 31.0 pg    Mean Corpuscular Hemoglobin Conc 30.4 (L) 32.0 - 36.0 g/dL    RDW 18.3 (H) 11.5 - 14.5 %    Platelets 183 150 - 350 K/uL    MPV 12.9 9.2 - 12.9 fL    Immature Granulocytes 0.6 (H) 0.0 - 0.5 %    Gran # (ANC) 18.9 (H) 1.8 - 7.7 K/uL    Immature Grans (Abs) 0.12 (H) 0.00 - 0.04 K/uL    Lymph # 1.0 1.0 - 4.8 K/uL    Mono # 1.3 (H) 0.3 - 1.0 K/uL    Eos # 0.1 0.0 - 0.5 K/uL    Baso # 0.06 0.00 - 0.20  K/uL    nRBC 0 0 /100 WBC    Gran% 88.3 (H) 38.0 - 73.0 %    Lymph% 4.5 (L) 18.0 - 48.0 %    Mono% 6.0 4.0 - 15.0 %    Eosinophil% 0.3 0.0 - 8.0 %    Basophil% 0.3 0.0 - 1.9 %    Aniso Slight     Poly Occasional     Differential Method Automated    Comprehensive metabolic panel    Collection Time: 05/09/19  2:39 PM   Result Value Ref Range    Sodium 133 (L) 136 - 145 mmol/L    Potassium 3.8 3.5 - 5.1 mmol/L    Chloride 92 (L) 95 - 110 mmol/L    CO2 24 23 - 29 mmol/L    Glucose 118 (H) 70 - 110 mg/dL    BUN, Bld 16 6 - 20 mg/dL    Creatinine 3.6 (H) 0.5 - 1.4 mg/dL    Calcium 8.2 (L) 8.7 - 10.5 mg/dL    Total Protein 8.3 6.0 - 8.4 g/dL    Albumin 3.4 (L) 3.5 - 5.2 g/dL    Total Bilirubin 0.8 0.1 - 1.0 mg/dL    Alkaline Phosphatase 189 (H) 55 - 135 U/L    AST 42 (H) 10 - 40 U/L    ALT 44 10 - 44 U/L    Anion Gap 17 (H) 8 - 16 mmol/L    eGFR if African American 15.4 (A) >60 mL/min/1.73 m^2    eGFR if non  13.3 (A) >60 mL/min/1.73 m^2   Troponin I    Collection Time: 05/09/19  2:39 PM   Result Value Ref Range    Troponin I 0.116 (H) 0.000 - 0.026 ng/mL   Brain natriuretic peptide    Collection Time: 05/09/19  2:39 PM   Result Value Ref Range     (H) 0 - 99 pg/mL   Protime-INR    Collection Time: 05/09/19  2:39 PM   Result Value Ref Range    Prothrombin Time 27.3 (H) 9.0 - 12.5 sec    INR 2.8 (H) 0.8 - 1.2   ISTAT PROCEDURE    Collection Time: 05/09/19  3:12 PM   Result Value Ref Range    POC PH 7.486 (H) 7.35 - 7.45    POC PCO2 41.0 35 - 45 mmHg    POC PO2 25 (LL) 40 - 60 mmHg    POC HCO3 31.0 (H) 24 - 28 mmol/L    POC BE 8 -2 to 2 mmol/L    POC SATURATED O2 51 (L) 95 - 100 %    POC TCO2 32 (H) 24 - 29 mmol/L    Sample VENOUS     Site Other     Allens Test N/A    ISTAT PROCEDURE    Collection Time: 05/09/19  3:14 PM   Result Value Ref Range    POC PH 7.527 (H) 7.35 - 7.45    POC PCO2 40.0 35 - 45 mmHg    POC PO2 28 (LL) 40 - 60 mmHg    POC HCO3 33.1 (H) 24 - 28 mmol/L    POC BE 10 -2 to 2 mmol/L     POC SATURATED O2 62 (L) 95 - 100 %    POC Sodium 132 (L) 136 - 145 mmol/L    POC Potassium 3.7 3.5 - 5.1 mmol/L    POC TCO2 34 (H) 24 - 29 mmol/L    POC Ionized Calcium 0.88 (L) 1.06 - 1.42 mmol/L    POC Hematocrit 48 36 - 54 %PCV    Sample VENOUS     Site Other     Allens Test N/A    Results for TALI PENN (MRN [id number]) as of 5/9/2019 17:32   Ref. Range 3/31/2019 05:25 4/1/2019 03:46 4/3/2019 09:40 4/10/2019 09:01 4/17/2019 10:05 5/1/2019 14:33 5/9/2019 14:39   WBC Latest Ref Range: 3.90 - 12.70 K/uL 10.90 11.83     21.33 (H)       Significant Imaging:   XR CHEST PA AND LATERAL    CLINICAL HISTORY:  Shortness of breath    TECHNIQUE:  PA and lateral views of the chest were performed.    COMPARISON:  Chest radiograph most recent 03/28/2019    FINDINGS:  Monitoring leads overlie the chest.  Large body habitus.    Right chest dual lead cardiac device is stable.  Cardiac silhouette remains midline and enlarged similar to prior.  There is prominence of the central pulmonary vasculature with bilateral diffuse nonspecific interstitial coarsening in a perihilar and basilar distribution with central peribronchial cuffing suggesting pulmonary edema/CHF pattern.  Additional patchy opacities within the right lower lobe.  Suspected trace left pleural effusion.  No large pneumothorax.  Mediastinal contours are unchanged.  No acute osseous process seen.      Impression       Cardiac device with findings suggesting worsening pulmonary edema/CHF pattern, with interstitial pneumonia not excluded.    Right basilar patchy opacities suggesting superimposed atelectasis/infiltrate.    This report was flagged in Epic as abnormal.      Electronically signed by: Jeancarlos Kohler MD  Date: 05/09/2019     EKG:  Vent. Rate : 070 BPM     Atrial Rate : 073 BPM     P-R Int : 000 ms          QRS Dur : 150 ms      QT Int : 482 ms       P-R-T Axes : 000 -06 104 degrees     QTc Int : 520 ms  Suspect unspecified pacemaker  failure  Ventricular-paced rhythm  Abnormal ECG  When compared with ECG of 10-APR-2019 11:43,  Electronic ventricular pacemaker has replaced Atrial fibrillation    Referred By:             Confirmed By:       Specimen Collected: 05/09/19 14:03 Last Resulted: 05/09/19 14:44            Assessment/Plan:     Acute on chronic respiratory failure with hypoxia  Acute shortness of breath  -Chronically on home O2 at 4L NC  -albuterol-ipratropium 2.5 mg-0.5 mg/3 mL 3 mL, Nebulization, Q6H and Q4 PRN  -I'm unsure what actually prompted the acute event in HD  -Regardless, it was transient and she has now returned to normal   -?Flash pulmonary edema with rapid BP shifts with HD   -?Acute CHF   -?Arrythmia or other cardiogenic issues   -?PTE   -?Infectious PNA   -?Acute bronchospastic event  -She denies any fever, chills, worsening cough (other than the brief episode)  -Have pacer interrogated in am  -Nephro to consider repeat HD for more fluid removal  -PTE unlikely given adequate anticoagulation and rapid self correction  -Repeat CXR in am, consider CT chest    Pulmonary sarcoidosis  -Ocular manifestations as well:   -prednisoLONE acetate 1 % ophthalmic suspension 1 drop, 1 drop, Left Eye, TID  -She follows with Dr. Rosas in Pulmonary Clinic here  -She is on Prednisone 20 mg PO qday at home, though she is unsure what her dose should b   -Weaning was supposed to be attempted, but apparently has not    Acute on chronic combined systolic and diastolic heart failure  · The estimated ejection fraction is 35% (3/11/19)  · Left ventricular diastolic dysfunction. (3/11/19)  -Followed in HTS clinic by Dr. Chavarria  -She used to be on adcirca but stopped due to symptomatic hypotension  -Fluid removal via HD  -On no ARB/ACE/BB due to recurrent hypotension    Secondary pulmonary hypertension  Chronic cor pulmonale  -estimated PA systolic pressure is 56 mm Hg (3/11/19)  -Moderately reduced right ventricular systolic function    ESRD on  HD  -Consult Nephrology, though successfully had HD today  -cinacalcet tablet 30 mg, 30 mg, Oral, Daily with breakfast  -sevelamer carbonate tablet 800 mg, 800 mg, Oral, TID WM  -Home med held:   -patiromer calcium sorbitex (VELTASSA) 16.8 gram PwPk, Take 33.6 g by mouth once daily    Recurrent AV graft malfunction  -HD TThS via LUE AVG (placed 2/3/16),    -Hx of PTA dilation of LUE AVG in late January 2019   -Hx of PTA dilation of LUE AVG 4/8/19  -aspirin EC tablet 81 mg, 81 mg, Oral, Daily  -clopidogrel tablet 75 mg, 75 mg, Oral, Daily  -Successful intervention of LUE AVG stenosis yesterday (5/8/19)  Leukocytosis    Diabetes mellitus 2, uncontrolled, neuropathy  -Low dose SSI protocol in the hospital  -gabapentin capsule 600 mg, 600 mg, Oral, BID  -nortriptyline capsule 25 mg, 25 mg, Oral, Nightly  -pregabalin capsule 100 mg, 100 mg, Oral, TID  -Home medications held:   -TRADJENTA 5 mg Tab tablet, TAKE 1 TABLET(5 MG) BY MOUTH EVERY DAY    Paroxysmal atrial fibrillation  Long term use of anticoagulants  Pacemaker in situ  -Successful JARAD/DCCV 6/27/2016  -warfarin (COUMADIN) tablet 5 mg, 5 mg, Oral, Daily  -Pt is currently rate controlled with HR <110              -In afib but V-paced  -[id number] score >2  -not on any rate control agents  -continue to monitor on telemetry  -continue to monitor INR daily: 2.8 today  -Maintain K > 4, Mg > 2    Recurrent, severe, orthostatic hypotension  -fludrocortisone tablet 100 mcg, 100 mcg, Oral, BID  -midodrine tablet 10 mg, 10 mg, Oral, TID  -Difficult case as severe orthostatic hypotension precluding proper therapy   -Plan for GUICHO hose and abdominal binder  -Continue symptomatic management with HD as pt is anuric.   -She is not on BB, ACE/ARB or spironolactone due to hypotension   -Continue to monitor on telemetry  -Monitor intake and output and obtain daily STANDING weights  -Fluid restriction to 1L per day and cardiac diet with salt restriction    Secondary adrenal  insufficiency   -Has seen Endocrine for this issue  -On fludricortisone; per note could taper once off prednisone but this is a long-term chronic medication  -Follow up outpatient with Endocrinology    Morbid obesity (BMI 40-49.9)  Obstructive sleep apnea  -Weight loss counseling  -Not on CPAP or BiPAP    Seizure disorder  -levETIRAcetam tablet 500 mg, 500 mg, Oral, BID  -Seizure precautions    Hyperlipidemia  -atorvastatin tablet 80 mg, 80 mg, Oral, QHS    Anemia in ESRD  -vitamin renal formula (B-complex-vitamin c-folic acid) 1 mg per capsule 1 capsule, 1 capsule, Oral, Daily  -Home medication held; not on formulary:   -DENISE-LINDA 0.8 mg Tab, TAKE 1 TABLET BY MOUTH ONCE DAILY   -Epo as per Nephrology with HD    Cervical radiculopathy and spasm  -tiZANidine tablet 4 mg, 4 mg, Oral, Daily PRN  -oxyCODONE immediate release tablet 5 mg, 5 mg, Oral, Q6H PRN     Personal history of gastric ulcer  -pantoprazole EC tablet 40 mg, 40 mg, Oral, Daily, DWIGHT. Saul Thompson MD    Chronic venous stasis ulcers bilateral legs  -Wound care consult      VTE Risk Mitigation (From admission, onward)        Ordered     warfarin (COUMADIN) tablet 5 mg  Daily      05/09/19 1811     Place GUICHO hose  Until discontinued      05/09/19 1811     Place sequential compression device  Until discontinued      05/09/19 1811     IP VTE HIGH RISK PATIENT  Once      05/09/19 1811            DWIGHT Thompson MD  Department of Hospital Medicine   [facility]

## 2019-05-09 NOTE — ED NOTES
aware of not being able to start Iv --Ultrasound uses   "[facility] Initial Evaluation      Subjective:    Celeste Alcala is a 55 year old female with a lumbar condition.  Condition occurred with:  Insidious onset.  Condition occurred: for unknown reasons.  This is a chronic condition  Patient to physical therapy with order for treatment of chronic low back pain dated 1/3/2016.  Patient discussed back pain with her doctor at her annual physical.  Patient reports she \"tweaked\" her low back tranferring in (or out) of her car recently.  She reports chronic right \"sciatic\" pain with prolong sitting.  Bending also aggravates the back.  3-4 years ago \"sciatica\" started without cause.    Patient reports pain:  Lumbar spine right and central lumbar spine.  Radiates to:  Gluteals right.  Pain is described as aching and stabbing  and reported as 3/10.  Associated symptoms:  Loss of motion/stiffness (patient denies numbness/tingling). Pain is worse in the P.M..  Symptoms are exacerbated by bending and sitting and relieved by rest, activity/movement, other and ice (massager).  Since onset symptoms are gradually worsening.  Special testing: none.  Previous treatment: none.    General health as reported by patient is good.                                            Objective:    Standing Alignment:        Lumbar:  Convex scoliosis L  Pelvis deviations alignment: clinically level.          Gait:    Gait Type:  Normal                    Lumbar/SI Evaluation  ROM:    AROM Lumbar:   Flexion:        80%  Ext:                    80%   Side Bend:        Left:     Right:   Rotation:           Left:     Right:   Side Glide:        Left:     Right:         Strength: Fair core strength  Lumbar Myotomes:  normal                  Neural Tension/Mobility:      Left side:SLR or SLR w/DF  negative.     Right side:   SLR w/DF or SLR  negative.   Lumbar Palpation:      Tenderness not present at Left:    Erector Spinae; Piriformis or Vertebral  Tenderness present at Right: PSIS " (area)  Tenderness not present at Right:  Erector Spinae; Piriformis or Vertebral      Spinal Segmental Conclusions: Multi-segmental hypomobility lumbar spine                                                       General     ROS    Assessment/Plan:      Patient is a 55 year old female with lumbar complaints.    Patient has the following significant findings with corresponding treatment plan.                Diagnosis 1:  Lumbar derangement  Pain -  self management, education, directional preference exercise and home program  Decreased ROM/flexibility - therapeutic exercise, therapeutic activity and home program  Decreased joint mobility - therapeutic exercise, therapeutic activity and home program  Decreased strength - therapeutic exercise, therapeutic activities and home program  Impaired muscle performance - neuro re-education and home program  Decreased function - therapeutic activities and home program  Impaired posture - neuro re-education, therapeutic activities and home program    Therapy Evaluation Codes:   1) History comprised of:   Personal factors that impact the plan of care:      None.    Comorbidity factors that impact the plan of care are:      None.     Medications impacting care: None.  2) Examination of Body Systems comprised of:   Body structures and functions that impact the plan of care:      Lumbar spine.   Activity limitations that impact the plan of care are:      Bending and Sitting.  3) Clinical presentation characteristics are:   Stable/Uncomplicated.  4) Decision-Making    Low complexity using standardized patient assessment instrument and/or measureable assessment of functional outcome.  Cumulative Therapy Evaluation is: Low complexity.    Previous and current functional limitations:  (See Goal Flow Sheet for this information)    Short term and Long term goals: (See Goal Flow Sheet for this information)     Communication ability:  Patient appears to be able to clearly communicate and  understand verbal and written communication and follow directions correctly.  Treatment Explanation - The following has been discussed with the patient:   RX ordered/plan of care  Anticipated outcomes  Possible risks and side effects  This patient would benefit from PT intervention to resume normal activities.   Rehab potential is good.    Frequency:  1 X week, once daily  Duration:  for 6 weeks  Discharge Plan:  Achieve all LTG.  Independent in home treatment program.  Reach maximal therapeutic benefit.    Please refer to the daily flowsheet for treatment today, total treatment time and time spent performing 1:1 timed codes.

## 2019-05-09 NOTE — ED PROVIDER NOTES
Encounter Date: 5/9/2019       History     Chief Complaint   Patient presents with    Shortness of Breath     post dialysis on non rebreather 10L. room air 80% now 95%     HPI   57 y/o F PMhx pulmonary sarcoidosis on prednisone 20mg (pt states prescribed 3L, states has been using 5L for several months), combined heart failure (EF 35%, DD, mod reduced RV systolic function), ESRD (via LUE AVG T,Th,S (no longer makes urine)), , Afib on warfarin, PPM, seizure disorder who presents due to shortness of breath. She states she has had chronic SOB for several years, however, today after dialysis when she began ambulating she developed SOB followed by chest pain. Her dialysis sessions usually last ~5 hrs. Her O2 was checked and per patient sats were in the 70s therefore she was transported to the ED. She presents to the ED on 100% nonrebreather, she was switched to nasal cannula 3L (her home dose) and saturating 90-92%.  Currently her chest pain has resolved and she states her shortness of breath is at baseline.  She denies fever, chills, PND, recent travel.    Of note patient recently had surgery to resolve stenosis of her fistula yesterday.    Most recent TTE 3/11 ejection fraction 35%, diastolic dysfunction, moderately reduced RV systolic function, pulmonary HTN.   Review of patient's allergies indicates:   Allergen Reactions    Bactrim [sulfamethoxazole-trimethoprim] Other (See Comments)     Causes renal failure    Nsaids (non-steroidal anti-inflammatory drug) Other (See Comments)     Renal failure     Past Medical History:   Diagnosis Date    Anemia in ESRD (end-stage renal disease) 3/7/2016    Anticoagulant long-term use     Cervical radiculopathy 7/20/2015    CHF (congestive heart failure)     Chronic combined systolic and diastolic heart failure 6/14/2013    EF 20% 2013, improved with Medical therapy, negative PET 2013    Chronic respiratory failure with hypoxia 04/22/2013    On home oxygen at 2-5 liters     Closed fracture of proximal end of right fibula 2016    Complications due to renal dialysis device, implant, and graft     DDD (degenerative disc disease), lumbar 2015    Dependence on renal dialysis     Diabetes mellitus type II, controlled 2017    ESRD on hemodialysis 2016    Essential hypertension     Gout     Lateral meniscal tear 2016    Lumbar stenosis 2015    Obesity, Class III, BMI 40-49.9 (morbid obesity)     Pacemaker     Paroxysmal atrial fibrillation 2014    Not on anticoagulation    Peripheral neuropathy 2013    Personal history of gastric ulcer 3/19/2013    Pneumonia of left lower lobe due to infectious organism 3/10/2019    Sarcoidosis, lung 2009    Diagnosed in  with ocular manifestation, on 4L home O2 and PO steroids    Secondary pulmonary hypertension 2015    Seizure disorder 3/19/2013    Shingles 2013    Thyroid disease      Past Surgical History:   Procedure Laterality Date    ABDOMINAL SURGERY      ABLATION N/A 2017    Performed by [facility] at [facility] CATH LAB    ANGIOPLASTY Left 1/10/2018    Performed by Torrey [facility]afana MD at [facility] OR 2ND FLR    ANGIOPLASTY-PERCUTANEOUS TRANSLUMINAL (PTA) Left 2017    Performed by [facility] at [facility] OR 2ND FLR    ANGIOPLASTY-PERCUTANEOUS TRANSLUMINAL (PTA) Left 10/12/2016    Performed by [facility] MD at [facility] OR 2ND FLR    CARDIAC PACEMAKER PLACEMENT       SECTION      x2    DECLOT GRAFT PERCUTANEOUS Left 2017    Performed by [facility] MD at [facility] OR 2ND FLR    DECLOT-GRAFT Left 2016    Performed by Harjit Starr MD at [facility] CATH LAB    DECLOT-GRAFT Left 2016    Performed by [facility]it Starr MD at [facility] CATH LAB    ECHOCARDIOGRAM-TRANSESOPHAGEAL N/A 2013    Performed by Delmy Surgeon at [facility] DELMY    Fistulogram Left 2019    Performed by [facility] MD at [facility] CATH LAB    fistulogram  Left 1/10/2018    [facility] by [facility] at [facility] OR 2ND FLR    FISTULOGRAM Left 9/13/2017    Performed by [facility] at [facility] CATH LAB    FISTULOGRAM Left 10/12/2016    Performed by [facility] MD at [facility] OR 2ND FLR    FISTULOGRAM Left 6/21/2016    Performed by [facility]jit Starr MD at [facility] CATH LAB    Fistulogram, LUE AVG, possible intervention Left 1/30/2019    Performed by [facility]ana MD at [facility] CATH LAB    Fistulogram, OR 11 Left 5/8/2019    Performed by [facility]afana MD at [facility] OR 2ND FLR    INJECTION-STEROID-EPIDURAL-TRANSFORAMINAL Right 7/20/2015    Performed by [facility] at [facility] PAIN MGT    INJECTION-STEROID-EPIDURAL-TRANSFORAMINAL Right 5/21/2015    Performed by [facility] at [facility] MGT    DEVKUNRAP-BLILC-DQMZCKNFMLKMJ / Left upper AV graft. Left 2/3/2016    Performed by [facility] MD at [facility] OR 2ND FLR    DJSJJCGQP-[id number]-OJGGIRYAFNKJC N/A 1/11/2017    Performed by Bladimir Adorno MD at [facility] CATH LAB    OTHER SURGICAL HISTORY      loop recorder implant    PLACEMENT-STENT Left 2/7/2017    Performed by [facility]ana MD at [facility] OR 2ND FLR    PTA, AV FISTULA N/A 1/30/2019    Performed by [facility] MD at [facility] CATH LAB    stent to fistula      TRANSESOPHAGEAL ECHOCARDIOGRAM (JARAD) N/A 6/27/2016    Performed by Sourav Machuca MD at [facility] CATH LAB    ULTRASOUND, UPPER GI TRACT, ENDOSCOPIC N/A 1/9/2014    Performed by Stewart Burgess MD at [facility] ENDO (2ND FLR)    VASCULAR SURGERY      fistula to L upper arm      Family History   Problem Relation Age of Onset    Kidney failure Mother     Hypertension Mother     Diabetes Mother     Coronary artery disease Father     Hypertension Father     Diabetes Father     Lupus Sister     Diabetes Maternal Grandmother     Colon cancer Neg Hx     Ovarian cancer Neg Hx     Breast cancer Neg Hx      Social History     Tobacco Use    Smoking status: Former  Smoker     Packs/day: 0.50     Years: 10.00     Pack years: 5.00     Types: Cigarettes     Last attempt to quit: 1994     Years since quittin.0    Smokeless tobacco: Never Used   Substance Use Topics    Alcohol use: No     Alcohol/week: 0.0 oz     Comment: rarely    Drug use: No     Review of Systems   Constitutional: Negative for chills and fever.   HENT: Negative for sore throat.    Eyes: Negative for visual disturbance.   Respiratory: Positive for cough and shortness of breath. Negative for wheezing.    Cardiovascular: Positive for leg swelling. Negative for chest pain.   Gastrointestinal: Negative for abdominal pain, constipation, diarrhea, nausea and vomiting.   Genitourinary: Negative for dysuria.   Musculoskeletal: Positive for arthralgias. Negative for myalgias.   Skin: Negative for rash.   Neurological: Positive for headaches.   Psychiatric/Behavioral: Negative for agitation and confusion.       Physical Exam     Initial Vitals [19 1342]   BP Pulse Resp Temp SpO2   130/84 80 (!) 24 98.6 °F (37 °C) 95 %      MAP       --         Physical Exam    Constitutional: She is not diaphoretic. No distress.   HENT:   Head: Normocephalic and atraumatic.   Eyes: Conjunctivae and EOM are normal. Pupils are equal, round, and reactive to light. No scleral icterus.   Neck: Neck supple.   Cardiovascular: Normal rate, regular rhythm and intact distal pulses.   Pulmonary/Chest: No respiratory distress. She has no wheezes. She has no rales.   On 3L nasal cannula  Difficult to auscultate 2/2 habitus   Abdominal: Soft. She exhibits no distension. There is tenderness. There is no rebound and no guarding.   Musculoskeletal: She exhibits edema (b/l +1 LE edema).   Lymphadenopathy:     She has no cervical adenopathy.   Neurological: She is alert and oriented to person, place, and time.   Skin: No rash noted.   Cool extremities   Psychiatric: She has a normal mood and affect.         ED Course   Procedures  Labs  Reviewed   CBC W/ AUTO DIFFERENTIAL - Abnormal; Notable for the following components:       Result Value    WBC 21.33 (*)     RBC 5.43 (*)     Mean Corpuscular Hemoglobin 26.5 (*)     Mean Corpuscular Hemoglobin Conc 30.4 (*)     RDW 18.3 (*)     Immature Granulocytes 0.6 (*)     Gran # (ANC) 18.9 (*)     Immature Grans (Abs) 0.12 (*)     Mono # 1.3 (*)     Gran% 88.3 (*)     Lymph% 4.5 (*)     All other components within normal limits   COMPREHENSIVE METABOLIC PANEL - Abnormal; Notable for the following components:    Sodium 133 (*)     Chloride 92 (*)     Glucose 118 (*)     Creatinine 3.6 (*)     Calcium 8.2 (*)     Albumin 3.4 (*)     Alkaline Phosphatase 189 (*)     AST 42 (*)     Anion Gap 17 (*)     eGFR if  15.4 (*)     eGFR if non  13.3 (*)     All other components within normal limits   TROPONIN I - Abnormal; Notable for the following components:    Troponin I 0.116 (*)     All other components within normal limits   B-TYPE NATRIURETIC PEPTIDE - Abnormal; Notable for the following components:     (*)     All other components within normal limits   PROTIME-INR - Abnormal; Notable for the following components:    Prothrombin Time 27.3 (*)     INR 2.8 (*)     All other components within normal limits   ISTAT PROCEDURE - Abnormal; Notable for the following components:    POC PH 7.486 (*)     POC PO2 25 (*)     POC HCO3 31.0 (*)     POC SATURATED O2 51 (*)     POC TCO2 32 (*)     All other components within normal limits   ISTAT PROCEDURE - Abnormal; Notable for the following components:    POC PH 7.527 (*)     POC PO2 28 (*)     POC HCO3 33.1 (*)     POC SATURATED O2 62 (*)     POC Sodium 132 (*)     POC TCO2 34 (*)     POC Ionized Calcium 0.88 (*)     All other components within normal limits          Imaging Results           X-Ray Chest PA And Lateral (Final result)  Result time 05/09/19 16:02:59    Final result by Jeancarlos Kohler MD (05/09/19 16:02:59)                  Impression:      Cardiac device with findings suggesting worsening pulmonary edema/CHF pattern, with interstitial pneumonia not excluded.    Right basilar patchy opacities suggesting superimposed atelectasis/infiltrate.    This report was flagged in Epic as abnormal.      Electronically signed by: Jeancarlos Kohler MD  Date:    05/09/2019  Time:    16:02             Narrative:    EXAMINATION:  XR CHEST PA AND LATERAL    CLINICAL HISTORY:  Shortness of breath    TECHNIQUE:  PA and lateral views of the chest were performed.    COMPARISON:  Chest radiograph most recent 03/28/2019    FINDINGS:  Monitoring leads overlie the chest.  Large body habitus.    Right chest dual lead cardiac device is stable.  Cardiac silhouette remains midline and enlarged similar to prior.  There is prominence of the central pulmonary vasculature with bilateral diffuse nonspecific interstitial coarsening in a perihilar and basilar distribution with central peribronchial cuffing suggesting pulmonary edema/CHF pattern.  Additional patchy opacities within the right lower lobe.  Suspected trace left pleural effusion.  No large pneumothorax.  Mediastinal contours are unchanged.  No acute osseous process seen.                                 Medical Decision Making:   Initial Assessment:   59 y/o F PMhx combined heart failure (EF 35%, DD, mod reduced RV systolic function), ESRD (via LUE AVG T,Th,S), HTN, Afib on warfarin, seizure disorder who presents due to hypoxic respiratory failure.   Differential includes but is not limited to exacerbation of her pulmonary sarcoidosis, PE, MI, CHF exacerbation, less likely ->pneumothorax, pericarditis, cardiac tamponade, aspiration, respiratory alkalosis, metabolic acidosis, anemia, hemoglobinopathy, pericarditis, cardiac tamponade, alveolar hemorrhage, pneumonia, pleural effusion w/ compressive atelectasis, pneumothorax, COPD  Most likely diagnosis of acute worsening of her chronic hypoxic respiratory failure 2/2  cardiorenal syndrome (however, patient completed dialysis and  from 1326 last month), ? High output heart failure from fistula.  PA & lateral CXR- pulmonary edema and prominence of pulm vasculature  ABG- unable to obtain  C/w supplemental O2  Troponin chronically elevated, currently at baseline, pt also w/ CKD; no longer has chest pain                Attending Attestation:   Physician Attestation Statement for Resident:  As the supervising MD  I agree with the above history. -:   As the supervising MD I agree with the above PE.    As the supervising MD I agree with the above treatment, course, plan, and disposition.  I have reviewed and agree with the residents interpretation of the following: lab data, EKG and x-rays.  I have reviewed the following: old records at this facility.                       Clinical Impression:       ICD-10-CM ICD-9-CM   1. SOB (shortness of breath) R06.02 786.05   2. Shortness of breath R06.02 786.05   3. Acute on chronic respiratory failure with hypoxia J96.21 518.84     799.02   4. Orthostatic hypotension I95.1 458.0   5. Secondary adrenal insufficiency E27.49 255.41   6. Pulmonary sarcoidosis D86.0 135     517.8   7. Seizure disorder G40.909 345.90   8. Ulcer of right lower extremity with fat layer exposed L97.912 707.10   9. Morbid obesity with BMI of 40.0-44.9, adult E66.01 278.01    Z68.41 V85.41   10. Mixed hyperlipidemia E78.2 272.2   11. Acute on chronic combined systolic and diastolic heart failure I50.43 428.43   12. Anemia in ESRD (end-stage renal disease) N18.6 285.21    D63.1 585.6   13. Malfunction of arteriovenous graft, subsequent encounter T82.590D V58.89     996.1   14. Leukocytosis, unspecified type D72.829 288.60   15. Paroxysmal A-fib I48.0 427.31   16. Pulmonary hypertension I27.20 416.8   17. Cervical radiculopathy M54.12 723.4   18. Cor pulmonale I27.81 416.9   19. Controlled type 2 diabetes mellitus with diabetic autonomic neuropathy, without long-term  current use of insulin E11.43 250.60     337.1   20. ESRD on hemodialysis N18.6 585.6    Z99.2 V45.11   21. Chronic kidney disease-mineral and bone disorder N18.9 585.9    E83.9 275.9    M89.9 733.90         Disposition:   Disposition: Admitted  Condition: Serious                        Chris Walker MD  Resident  05/09/19 2211       Kelvin Monroy MD  05/12/19 1400

## 2019-05-10 PROBLEM — L97.529 DIABETIC ULCER OF BOTH FEET: Status: ACTIVE | Noted: 2019-01-01

## 2019-05-10 PROBLEM — N18.6 ANEMIA IN ESRD (END-STAGE RENAL DISEASE): Status: ACTIVE | Noted: 2019-01-01

## 2019-05-10 PROBLEM — I27.81 COR PULMONALE: Status: ACTIVE | Noted: 2019-01-01

## 2019-05-10 PROBLEM — I48.0 PAROXYSMAL A-FIB: Status: ACTIVE | Noted: 2019-01-01

## 2019-05-10 PROBLEM — K25.9 GASTRIC ULCER: Status: ACTIVE | Noted: 2019-01-01

## 2019-05-10 PROBLEM — D63.1 ANEMIA IN ESRD (END-STAGE RENAL DISEASE): Status: ACTIVE | Noted: 2019-01-01

## 2019-05-10 PROBLEM — L97.519 DIABETIC ULCER OF BOTH FEET: Status: ACTIVE | Noted: 2019-01-01

## 2019-05-10 PROBLEM — E11.621 DIABETIC ULCER OF BOTH FEET: Status: ACTIVE | Noted: 2019-01-01

## 2019-05-10 NOTE — NURSING TRANSFER
Nursing Transfer Note      Pt arrived from er via stretcher accompanied by transporter.transported with telemetry and o2 at 3l nc.aaox4.resp even and nonlabored.rr 18/min.no sob noted at this time.pt has a hx of seizures.side rails padded.suction machine at bedside.pt has chronic wounds to ble.being followed by home health and wound care.dressings clean,dry and intact to ble.wound care consulted.safety precautions implemented.fistula to left upper arm with + thrill and bruit.dressing intact.limb alert bracelet intact.will monitor.

## 2019-05-10 NOTE — ASSESSMENT & PLAN NOTE
-Has seen Endocrine for this issue  -On fludricortisone; per note could taper once off prednisone but this is a long-term chronic medication  -Follow up outpatient with Endocrinology

## 2019-05-10 NOTE — PROGRESS NOTES
Patient called to cancel today's coumadin clinic appointment, reports she was admitted to the hospital here at [facility] 5/09/19 due to low blood pressure and low oxygen level, advised patient to call coumadin clinic when she's discharged from the hospital, will check status 5/13/19

## 2019-05-10 NOTE — HOSPITAL COURSE
Pt admitted for transient SOB after HD. Unclear etiology in cause of SOB. EKG shows paced rhythm with underlying aflutter. Troponin at baseline and flat x 3. CXR shows pulm edema/CHF pattern but cannot exclude interstitial pneumonia. Pt afebrile without leukocytosis, no cough. PPM to be interrogated, no events noted during time of SOB. Pt received HD without complication. Pt maintained sats on home O2 requirements during stay, no further episodes of SOB. Stable for dc with PCP fu. Return precuations discussed. No further questions at d/c.

## 2019-05-10 NOTE — PLAN OF CARE
05/10/19 1050   Discharge Assessment   Assessment Type Discharge Planning Assessment   Confirmed/corrected address and phone number on facesheet? Yes   Assessment information obtained from? Patient   Expected Length of Stay (days) 2   Communicated expected length of stay with patient/caregiver yes   Prior to hospitilization cognitive status: Alert/Oriented   Prior to hospitalization functional status: Assistive Equipment   Current cognitive status: Alert/Oriented   Current Functional Status: Needs Assistance   Lives With friend(s)  (friend, Osbaldo Crowley ([phone]))   Able to Return to Prior Arrangements yes   Is patient able to care for self after discharge? Yes   Patient's perception of discharge disposition home health  (pt currently receiving home health care from [facility]. )   Readmission Within the Last 30 Days no previous admission in last 30 days   Patient currently being followed by outpatient case management? No   Patient currently receives any other outside agency services? No   Equipment Currently Used at Home oxygen;glucometer;wheelchair;walker, standard;wound care supplies   Do you have any problems affording any of your prescribed medications? No   Is the patient taking medications as prescribed? yes   Does the patient have transportation home? Yes   Transportation Anticipated family or friend will provide  (mother, Maikel Magallanes ([phone]))   Dialysis Name and Scheduled days [facility] (TTS) [address] LUE AVG   Does the patient receive services at the Coumadin Clinic? Yes  ([facility])   Discharge Plan A Home Health  ([facility])   Discharge Plan B Home with family   DME Needed Upon Discharge  rollator   Patient/Family in Agreement with Plan yes     Dx: SOB  Consult: neph & wound care  Pharm: [facility]  Hosp f/u appt: Dr.Abdul Altamirano (pulm) on 5/15/19 at 1430 w/PFTs at 1230 & Dr. Carlos A Caputo (PCP) on 5/31/19 at 1500    Voicemail message left for SANJU Briggs  ([phone]) w/[facility] informing of the patient's hospitalization & questioning if new orders will be needed to resume HH care since the patient is in OBS status. Awaiting return call.     Patient stated that her rollator is breaking & requesting that this CM find out if [facility] will pay for another. CM was informed by Mercedes ([phone]) w/[facility] that [facility] paid for a RW for the patient in 2017 & will not pay for a rollator. CM informed the patient of above. Will continue to follow.

## 2019-05-10 NOTE — ASSESSMENT & PLAN NOTE
Recurrent, severe  -fludrocortisone tablet 100 mcg, 100 mcg, Oral, BID  -midodrine tablet 10 mg, 10 mg, Oral, TID  -Difficult case as severe orthostatic hypotension precluding proper therapy              -Plan for GUICHO hose and abdominal binder  -Continue symptomatic management with HD as pt is anuric.   -She is not on BB, ACE/ARB or spironolactone due to hypotension   -Continue to monitor on telemetry  -Monitor intake and output and obtain daily STANDING weights  -Fluid restriction to 1L per day and cardiac diet with salt restriction

## 2019-05-10 NOTE — NURSING
Troponin level 0.123.Merly SANTA notified.pt scheduled for next troponin in 6hrs.denies chest pain.

## 2019-05-10 NOTE — ASSESSMENT & PLAN NOTE
Long term use of anticoagulants  Pacemaker in situ  -Successful JARAD/DCCV 6/27/2016  -warfarin (COUMADIN) tablet 5 mg, 5 mg, Oral, Daily  -Pt is currently rate controlled with HR <110              -In afib but V-paced  -[id number] score >2  -not on any rate control agents  -continue to monitor on telemetry  -continue to monitor INR daily: 2.8 today  -Maintain K > 4, Mg > 2

## 2019-05-10 NOTE — HPI
Ms. Medel is a 59yo lady well known to me from multiple past admissions.  She has multiple medical problems:  pulmonary sarcoidosis on 5 L home oxygen, Class 3 HFrEF (3/2019 EF 35%), perm Afib/flutter s/p 6/2017 AVN ablation & PPM, ESRD (TTS), Type 2 DM (3/2019 A1c 6.2%), pulmonary HTN, YOANDY, and recently diagnosed renal insufficiency secondary to chronic steroid therapy  and orthostatic hypotension.        She has recently been having issues with her left upper arm graft with the arterial access point, so Dr. Villafana performed a fistulagram with retrograde access from distal portion of graft vs L transradial area.  This was done yesterday (5/8).  I'm not able to tell in Epic what they actually found or performed as of yet on that procedure, but the patient states since then she was able to undergo HD today with no issues.     In fact, when she went to HD today, she reports feeling absolutely normal and at her baseline.  During HD she had major BP fluctuations (not terribly uncommon for her), with her SBP dropping to 80 at one point.  At the end of HD she suddenly felt weak and dizzy, then became very short of breath.  Her HD sessions are usually 4-5 hours due to her BP fluctuations.  When she became short of breath, her oxygen saturation was checked and found to be in the 70's.  EMT was called and placed her on a NRB mask, arriving at the ED saturating 100%.  After arriving in the ED, she was immediately able to be titrated back down to her normal nasal canula at 4L saturating 94% with resolution of all of her symptoms.  Around the same time that she became short of breath, she also developed some chest pressure, but that has also now resolved.  During the HD and SOB period, she did have some prolonged coughing spells.

## 2019-05-10 NOTE — TELEPHONE ENCOUNTER
----- Message from Eden Angel sent at 5/10/2019  8:41 AM CDT -----  Contact: self [phone]  .Needs Advice    Reason for call:        Communication Preference:phone    Additional Information:pt states she needs to be reschedule  as soon as she gets out the hospital please call back to discuss.

## 2019-05-10 NOTE — TELEPHONE ENCOUNTER
I returned patient call on today at 3:29pm, the patient has a voicemail that have not been setup yet.

## 2019-05-10 NOTE — ASSESSMENT & PLAN NOTE
Consult Nephrology, though successfully had HD today  -cinacalcet tablet 30 mg, 30 mg, Oral, Daily with breakfast  -sevelamer carbonate tablet 800 mg, 800 mg, Oral, TID WM  -Home med held:              -patiromer calcium sorbitex (VELTASSA) 16.8 gram PwPk, Take 33.6 g by mouth once daily  K 5.5 this AM, started kayexalate while in house to substitute for Veltassa

## 2019-05-10 NOTE — PLAN OF CARE
Problem: Adult Inpatient Plan of Care  Goal: Plan of Care Review  Outcome: Ongoing (interventions implemented as appropriate)  POC reviewed with Pt. Pt progressing toward goals. Safety precautions maintained. Pt free from falls.

## 2019-05-10 NOTE — PROGRESS NOTES
Cardiac device interrogation and/or reprogramming completed by industry representative, Faith Rockwell ; please refer to report located in the Cards Procedure tab.

## 2019-05-10 NOTE — ASSESSMENT & PLAN NOTE
-estimated PA systolic pressure is 56 mm Hg (3/11/19)  -Moderately reduced right ventricular systolic function

## 2019-05-10 NOTE — ASSESSMENT & PLAN NOTE
-Low dose SSI protocol in the hospital  -gabapentin capsule 600 mg, 600 mg, Oral, BID  -nortriptyline capsule 25 mg, 25 mg, Oral, Nightly  -pregabalin capsule 100 mg, 100 mg, Oral, TID  -Home medications held:              -TRADJENTA 5 mg Tab tablet, TAKE 1 TABLET(5 MG) BY MOUTH EVERY DAY

## 2019-05-10 NOTE — SUBJECTIVE & OBJECTIVE
Past Medical History:   Diagnosis Date    Anemia in ESRD (end-stage renal disease) 3/7/2016    Anticoagulant long-term use     Cervical radiculopathy 2015    CHF (congestive heart failure)     Chronic combined systolic and diastolic heart failure 2013    EF 20% , improved with Medical therapy, negative PET     Chronic respiratory failure with hypoxia 2013    On home oxygen at 2-5 liters    Closed fracture of proximal end of right fibula 2016    Complications due to renal dialysis device, implant, and graft     DDD (degenerative disc disease), lumbar 2015    Dependence on renal dialysis     Diabetes mellitus type II, controlled 2017    ESRD on hemodialysis 2016    Essential hypertension     Gout     Lateral meniscal tear 2016    Lumbar stenosis 2015    Obesity, Class III, BMI 40-49.9 (morbid obesity)     Pacemaker     Paroxysmal atrial fibrillation 2014    Not on anticoagulation    Peripheral neuropathy 2013    Personal history of gastric ulcer 3/19/2013    Pneumonia of left lower lobe due to infectious organism 3/10/2019    Sarcoidosis, lung 2009    Diagnosed in  with ocular manifestation, on 4L home O2 and PO steroids    Secondary pulmonary hypertension 2015    Seizure disorder 3/19/2013    Shingles 2013    Thyroid disease        Past Surgical History:   Procedure Laterality Date    ABDOMINAL SURGERY      ABLATION N/A 2017    Performed [facility] C[facility] LAB    ANGIOPLASTY Left 1/10/2018    Performed by Torrey [facility]afana MD at [facility] OR 2ND FLR    ANGIOPLASTY-PERCUTANEOUS TRANSLUMINAL (PTA) Left 2017    Performed by [facility] at [facility] OR 2ND FLR    ANGIOPLASTY-PERCUTANEOUS TRANSLUMINAL (PTA) Left 10/12/2016    Performed by [facility] at [facility] OR 2ND FLR    CARDIAC PACEMAKER PLACEMENT       SECTION      x2    DECLOT GRAFT PERCUTANEOUS Left  2/7/2017    Performed by [facility] MD at [facility] OR 2ND FLR    DECLOT-GRAFT Left 6/21/2016    Performed by Harjit Starr MD at [facility] CATH LAB    DECLOT-GRAFT Left 6/20/2016    Performed by Harjit Starr MD at [facility] CATH LAB    ECHOCARDIOGRAM-TRANSESOPHAGEAL N/A 6/27/2013    Performed by Delmy Surgeon at [facility] DELMY    Fistulogram Left 4/8/2019    Performed by Torrey Villafana MD at [facility] CATH LAB    fistulogram Left 1/10/2018    Performed by Torrey Villafana MD at [facility] OR 2ND FLR    FISTULOGRAM Left 9/13/2017    Performed by Torrey Villafana MD at [facility] CATH LAB    FISTULOGRAM Left 10/12/2016    Performed by Tor[facility]afana MD at [facility] OR 2ND FLR    FISTULOGRAM Left 6/21/2016    Performed by Harjit Starr MD at [facility] CATH LAB    Fistulogram, LUE AVG, possible intervention Left 1/30/2019    Performed by Torrey Villafana MD at [facility] CATH LAB    Fistulogram, OR 11 Left 5/8/2019    Performed by Torrey Villafana MD at [facility] OR 2ND FLR    INJECTION-STEROID-EPIDURAL-TRANSFORAMINAL Right 7/20/2015    Performed by [facility]rez [facility] at [facility] PAIN MGT    INJECTION-STEROID-EPIDURAL-TRANSFORAMINAL Right 5/21/2015    Performed by Patria Gut[facility]rez MD at [facility] PAIN MGT    HECXYFSCP-STLLK-MGEHZGVJUSAGP / Left upper AV graft. Left 2/3/2016    Performed by Torrey Villafana MD at [facility] OR 2ND FLR    UGTNSLVAL-LVIRGCNRD-NBITNZVGDUWXR N/A 1/11/2017    Performed by Bladimir Adorno MD at [facility] CATH LAB    OTHER SURGICAL HISTORY      loop recorder implant    PLACEMENT-STENT Left 2/7/2017    Performed by Torrey Villafana MD at [facility] OR 2ND FLR    PTA, AV FISTULA N/A 1/30/2019    Performed by Torrey Villafana MD at [facility] CATH LAB    stent to fistula      TRANSESOPHAGEAL ECHOCARDIOGRAM (JARAD) N/A 6/27/2016    Performed by Sourav Machuca MD at [facility] CATH LAB    ULTRASOUND, UPPER GI TRACT, ENDOSCOPIC N/A 1/9/2014    Performed by Stewart Burgess MD at [facility] ENDO (2ND FLR)    VASCULAR SURGERY       fistula to L upper arm        Review of patient's allergies indicates:   Allergen Reactions    Bactrim [sulfamethoxazole-trimethoprim] Other (See Comments)     Causes renal failure    Nsaids (non-steroidal anti-inflammatory drug) Other (See Comments)     Renal failure     Current Facility-Administered Medications   Medication Frequency    acetaminophen tablet 500 mg Q6H PRN    albuterol-ipratropium 2.5 mg-0.5 mg/3 mL nebulizer solution 3 mL Q6H    albuterol-ipratropium 2.5 mg-0.5 mg/3 mL nebulizer solution 3 mL Q4H PRN    aspirin EC tablet 81 mg Daily    atorvastatin tablet 80 mg QHS    cinacalcet tablet 30 mg Daily with breakfast    clopidogrel tablet 75 mg Daily    dextrose 50% injection 12.5 g PRN    dextrose 50% injection 25 g PRN    fludrocortisone tablet 100 mcg BID    gabapentin capsule 600 mg BID    glucagon (human recombinant) injection 1 mg PRN    glucose chewable tablet 16 g PRN    glucose chewable tablet 24 g PRN    insulin aspart U-100 pen 0-5 Units QID (AC + HS) PRN    levETIRAcetam tablet 500 mg BID    midodrine tablet 10 mg TID    nortriptyline capsule 25 mg Nightly    ondansetron disintegrating tablet 8 mg Q8H PRN    oxyCODONE immediate release tablet 5 mg Q6H PRN    pantoprazole EC tablet 40 mg Daily    prednisoLONE acetate 1 % ophthalmic suspension 1 drop TID    predniSONE tablet 20 mg Daily    pregabalin capsule 100 mg TID    promethazine tablet 25 mg Q6H PRN    ramelteon tablet 8 mg Nightly PRN    senna-docusate 8.6-50 mg per tablet 1 tablet BID    sevelamer carbonate tablet 800 mg TID WM    sodium chloride 0.9% flush 10 mL PRN    sodium polystyrene powder 15 g Daily    tiZANidine tablet 4 mg Daily PRN    vitamin renal formula (B-complex-vitamin c-folic acid) 1 mg per capsule 1 capsule Daily    warfarin (COUMADIN) tablet 5 mg Daily     Family History     Problem Relation (Age of Onset)    Coronary artery disease Father    Diabetes Mother, Father, Maternal  Grandmother    Hypertension Mother, Father    Kidney failure Mother    Lupus Sister        Tobacco Use    Smoking status: Former Smoker     Packs/day: 0.50     Years: 10.00     Pack years: 5.00     Types: Cigarettes     Last attempt to quit: 1994     Years since quittin.0    Smokeless tobacco: Never Used   Substance and Sexual Activity    Alcohol use: No     Alcohol/week: 0.0 oz     Comment: rarely    Drug use: No    Sexual activity: Not Currently     Review of Systems   Constitutional: Negative for activity change, chills, diaphoresis, fatigue and fever.   Respiratory: Positive for shortness of breath. Negative for cough, choking, wheezing and stridor.    Cardiovascular: Positive for leg swelling. Negative for chest pain and palpitations.   Gastrointestinal: Negative for abdominal pain, diarrhea, nausea and vomiting.   Genitourinary: Positive for decreased urine volume. Negative for flank pain.   Skin: Negative.    Neurological: Negative.    Psychiatric/Behavioral: Negative.      Objective:     Vital Signs (Most Recent):  Temp: 98.7 °F (37.1 °C) (05/10/19 0738)  Pulse: 66 (05/10/19 1046)  Resp: 18 (05/10/19 0738)  BP: 93/62 (05/10/19 0738)  SpO2: (!) 94 % (05/10/19 0738)  O2 Device (Oxygen Therapy): nasal cannula (05/10/19 0738) Vital Signs (24h Range):  Temp:  [97.4 °F (36.3 °C)-98.7 °F (37.1 °C)] 98.7 °F (37.1 °C)  Pulse:  [66-80] 66  Resp:  [18-24] 18  SpO2:  [94 %-96 %] 94 %  BP: ()/(52-84) 93/62     Weight: 118.6 kg (261 lb 7.5 oz) (05/10/19 0626)  Body mass index is 39.76 kg/m².  Body surface area is 2.39 meters squared.    I/O last 3 completed shifts:  In: 240 [P.O.:240]  Out: 0     Physical Exam   Constitutional: She is oriented to person, place, and time. She appears well-developed and well-nourished. No distress.   HENT:   Head: Normocephalic and atraumatic.   Neck: Normal range of motion.   Cardiovascular: Normal rate and regular rhythm.   Pulmonary/Chest: Effort normal. No stridor.  No respiratory distress. She has no wheezes. She has rales in the right lower field and the left lower field.   Abdominal: She exhibits no distension. There is no tenderness.   Musculoskeletal: Normal range of motion. She exhibits edema (+2 pitting edema to bilateral lower extremities).   Neurological: She is alert and oriented to person, place, and time.   Skin: Skin is warm and dry. She is not diaphoretic.   Psychiatric: She has a normal mood and affect. Her behavior is normal. Judgment and thought content normal.       Significant Labs:  CBC:   Recent Labs   Lab 05/10/19  0532   WBC 17.21*   RBC 4.72   HGB 12.5   HCT 41.6   *   MCV 88   MCH 26.5*   MCHC 30.0*     CMP:   Recent Labs   Lab 05/09/19  1439 05/10/19  0532   * 168*   CALCIUM 8.2* 8.3*   ALBUMIN 3.4*  --    PROT 8.3  --    * 131*   K 3.8 5.5*   CO2 24 25   CL 92* 90*   BUN 16 30*   CREATININE 3.6* 5.8*   ALKPHOS 189*  --    ALT 44  --    AST 42*  --    BILITOT 0.8  --      All labs within the past 24 hours have been reviewed.

## 2019-05-10 NOTE — PROGRESS NOTES
Pt arrived to unit via stretcher ambulated to bed. UF GOAL 1L RAISSA fistula +bruit/thril accessed with 15g needles

## 2019-05-10 NOTE — ASSESSMENT & PLAN NOTE
-HD TThS via LUE AVG (placed 2/3/16),               -Hx of PTA dilation of LUE AVG in late January 2019              -Hx of PTA dilation of LUE AVG 4/8/19  -aspirin EC tablet 81 mg, 81 mg, Oral, Daily  -clopidogrel tablet 75 mg, 75 mg, Oral, Daily  -Successful intervention of LUE AVG stenosis yesterday (5/8/19)

## 2019-05-10 NOTE — PROGRESS NOTES
[facility] HEMODIALYSIS NOTE     Patient currently on hemodialysis for removal of uremic toxins and volume.     Patient seen and evaluated on hemodialysis, tolerating treatment, see HD flowsheet for vitals and assessments.      Ultrafiltration goal is 2L as tolerated, keep map >65     Labs have been reviewed and the dialysate bath has been adjusted.     Assessment/Plan:    -Hgb 12.5, within goal of >10  -Phos 4.3, continue binders  -Renal diet  -Strict I/O's and daily weights  -Will continue to follow      AYAN Levine, AGNP-C  Nephrology  Pager:  [phone]

## 2019-05-10 NOTE — ASSESSMENT & PLAN NOTE
-Ocular manifestations as well:              -prednisoLONE acetate 1 % ophthalmic suspension 1 drop, 1 drop, Left Eye, TID  -She follows with Dr. Rosas in Pulmonary Clinic here  -She is on Prednisone 20 mg PO qday at home, though she is unsure what her dose should b              -Weaning was supposed to be attempted, but apparently has not

## 2019-05-10 NOTE — ASSESSMENT & PLAN NOTE
-vitamin renal formula (B-complex-vitamin c-folic acid) 1 mg per capsule 1 capsule, 1 capsule, Oral, Daily  -Home medication held; not on formulary:              -DENISE-LINDA 0.8 mg Tab, TAKE 1 TABLET BY MOUTH ONCE DAILY   -Epo as per Nephrology with HD

## 2019-05-10 NOTE — HPI
Ms. Medel is a 59yo AAF with a past medical history of HTN, HLD, class 3 HFrEF, COPD on home 02 (4L), paroxysmal afib (on coumadin), pulmonary sarcoidosis, seizure disorder, T2DM, and ESRD on HD TTS via a LUE AVG. She presented to [facility] after experiencing SOB after her outpatient HD treatment on 5/9/19. On arrival EMS noted that her Sp02 was in the 70's, and she was then placed on 100% non-rebreather. In the ED she was titrated off the non-rebreather to 4L nasal canula. In the ED the patient had a chest x-ray that was concerned for pulmonary edema pneumonia aspiration or sepsis. On assessment this morning, she still complains of a little SOB. Nephrology was consulted for management of ESRD while inpatient.

## 2019-05-10 NOTE — ASSESSMENT & PLAN NOTE
Ms. Medel is a 59yo AAF with a past medical history of ESRD that presented to the ED with complaints of SOB after her HD treatment on 5/9/19. She was initially on 100% non-rebreather, but has been titrated to 4L nasal canula. She still complains of having a little SOB and bilateral leg swelling. Chest Xray concerning for pulmonary edema pneumonia aspiration or sepsis.    Outpatient HD information:  -Outpatient HD unit: [facility]   -HD tx days: TTS  -HD tx time: 4hrs 45mins  -HD access: RAISSA AVG  -Last HD: 5/9/19  -HD modality: iHD  -Residual urine: none   -EDW: 115.5kg    Plan:  -Will dialyze today for metabolic clearance and volume management  -Target UF 2-3L as tolerated, keep map >65   -Recommend daily phos levels and daily renal function panels   -Renal diet  -800ml/24hr fluid restriction   -Strict I/O's and daily weights   -Hgb 12.5, at goal of >10  -Phos 4.3, continue binders   -Will continue to follow

## 2019-05-10 NOTE — SUBJECTIVE & OBJECTIVE
Interval History: No acute events overnight. Pt seen at bedside this AM. She reports improvement in SOB, but she does endorse HYATT. No CP. No other complaints voiced. Discussed plan of care.    Review of Systems   Constitutional: Positive for fatigue. Negative for fever.   Respiratory: Positive for cough (resolved) and shortness of breath.    Cardiovascular: Positive for leg swelling (chronic). Negative for chest pain.   Gastrointestinal: Negative for abdominal pain and vomiting.   Genitourinary: Negative for dysuria.   Musculoskeletal: Negative for back pain.   Skin: Positive for wound (chronic BLE).   Neurological: Negative for syncope and weakness.   Hematological: Bruises/bleeds easily.   Psychiatric/Behavioral: Negative for agitation and confusion.     Objective:     Vital Signs (Most Recent):  Temp: 98.7 °F (37.1 °C) (05/10/19 0738)  Pulse: 66 (05/10/19 0738)  Resp: 18 (05/10/19 0738)  BP: 93/62 (05/10/19 0738)  SpO2: (!) 94 % (05/10/19 0738) Vital Signs (24h Range):  Temp:  [97.4 °F (36.3 °C)-98.7 °F (37.1 °C)] 98.7 °F (37.1 °C)  Pulse:  [66-80] 66  Resp:  [18-24] 18  SpO2:  [94 %-96 %] 94 %  BP: ()/(52-84) 93/62     Weight: 118.6 kg (261 lb 7.5 oz)  Body mass index is 39.76 kg/m².    Intake/Output Summary (Last 24 hours) at 5/10/2019 0913  Last data filed at 5/10/2019 0500  Gross per 24 hour   Intake 240 ml   Output 0 ml   Net 240 ml      Physical Exam   Constitutional: She is oriented to person, place, and time. She appears well-developed and well-nourished. No distress.   HENT:   Head: Normocephalic.   Eyes: EOM are normal.   Neck: Normal range of motion.   Cardiovascular: Normal rate and regular rhythm.   Murmur heard.  Pulmonary/Chest: Effort normal. No respiratory distress. She has rales (lower lung fields).   Pt on home supplemental O2 3L, speaking in full sentences   Abdominal: Soft. She exhibits no distension.   Truncal obesity   Musculoskeletal: She exhibits edema.   BLE wrapped   Neurological:  She is alert and oriented to person, place, and time.   Skin: Skin is warm and dry.   Psychiatric: She has a normal mood and affect. Her behavior is normal.       Significant Labs: All pertinent labs within the past 24 hours have been reviewed.    Significant Imaging: I have reviewed all pertinent imaging results/findings within the past 24 hours.

## 2019-05-10 NOTE — PROGRESS NOTES
[facility] Hospital Medicine  Progress Note    Patient Name: Sisi Medel  MRN: [id number]  Patient Class: OP- Observation   Admission Date: 5/9/2019  Length of Stay: 0 days  Attending Physician: BOB Thompson MD  Primary Care Provider: Carlos A Caputo MD    [place] [facility] Medicine Team: [facility] HOSP MED E Snow Vasquez PA-C    Subjective:     Principal Problem:Acute on chronic respiratory failure with hypoxia    HPI:   Ms. Medel is a 59yo lady well known to me from multiple past admissions.  She has multiple medical problems:  pulmonary sarcoidosis on 5 L home oxygen, Class 3 HFrEF (3/2019 EF 35%), perm Afib/flutter s/p 6/2017 AVN ablation & PPM, ESRD (TTS), Type 2 DM (3/2019 A1c 6.2%), pulmonary HTN, YOANDY, and recently diagnosed renal insufficiency secondary to chronic steroid therapy  and orthostatic hypotension.        She has recently been having issues with her left upper arm graft with the arterial access point, so Dr. Villafana performed a fistulagram with retrograde access from distal portion of graft vs L transradial area.  This was done yesterday (5/8).  I'm not able to tell in Epic what they actually found or performed as of yet on that procedure, but the patient states since then she was able to undergo HD today with no issues.     In fact, when she went to HD today, she reports feeling absolutely normal and at her baseline.  During HD she had major BP fluctuations (not terribly uncommon for her), with her SBP dropping to 80 at one point.  At the end of HD she suddenly felt weak and dizzy, then became very short of breath.  Her HD sessions are usually 4-5 hours due to her BP fluctuations.  When she became short of breath, her oxygen saturation was checked and found to be in the 70's.  EMT was called and placed her on a NRB mask, arriving at the ED saturating 100%.  After arriving in the ED, she was immediately able to be titrated back down to her normal nasal canula at 4L  saturating 94% with resolution of all of her symptoms.  Around the same time that she became short of breath, she also developed some chest pressure, but that has also now resolved.  During the HD and SOB period, she did have some prolonged coughing spells.    Hospital Course:  Pt admitted for transient SOB after HD. Unclear etiology in cause of SOB. EKG shows paced rhythm with underlying aflutter. Troponin at baseline and flat x 3. CXR shows pulm edema/CHF pattern but cannot exclude interstitial pneumonia. Pt afebrile without leukocytosis. PPM to be interrogated today. Nephrology and wound care consulted (chronic wounds to LE).     Interval History: No acute events overnight. Pt seen at bedside this AM. She reports improvement in SOB, but she does endorse HYATT. No CP. No other complaints voiced. Discussed plan of care.    Review of Systems   Constitutional: Positive for fatigue. Negative for fever.   Respiratory: Positive for cough (resolved) and shortness of breath.    Cardiovascular: Positive for leg swelling (chronic). Negative for chest pain.   Gastrointestinal: Negative for abdominal pain and vomiting.   Genitourinary: Negative for dysuria.   Musculoskeletal: Negative for back pain.   Skin: Positive for wound (chronic BLE).   Neurological: Negative for syncope and weakness.   Hematological: Bruises/bleeds easily.   Psychiatric/Behavioral: Negative for agitation and confusion.     Objective:     Vital Signs (Most Recent):  Temp: 98.7 °F (37.1 °C) (05/10/19 0738)  Pulse: 66 (05/10/19 0738)  Resp: 18 (05/10/19 0738)  BP: 93/62 (05/10/19 0738)  SpO2: (!) 94 % (05/10/19 0738) Vital Signs (24h Range):  Temp:  [97.4 °F (36.3 °C)-98.7 °F (37.1 °C)] 98.7 °F (37.1 °C)  Pulse:  [66-80] 66  Resp:  [18-24] 18  SpO2:  [94 %-96 %] 94 %  BP: ()/(52-84) 93/62     Weight: 118.6 kg (261 lb 7.5 oz)  Body mass index is 39.76 kg/m².    Intake/Output Summary (Last 24 hours) at 5/10/2019 0913  Last data filed at 5/10/2019  0500  Gross per 24 hour   Intake 240 ml   Output 0 ml   Net 240 ml      Physical Exam   Constitutional: She is oriented to person, place, and time. She appears well-developed and well-nourished. No distress.   HENT:   Head: Normocephalic.   Eyes: EOM are normal.   Neck: Normal range of motion.   Cardiovascular: Normal rate and regular rhythm.   Murmur heard.  Pulmonary/Chest: Effort normal. No respiratory distress. She has rales (lower lung fields).   Pt on home supplemental O2 3L, speaking in full sentences   Abdominal: Soft. She exhibits no distension.   Truncal obesity   Musculoskeletal: She exhibits edema.   BLE wrapped   Neurological: She is alert and oriented to person, place, and time.   Skin: Skin is warm and dry.   Psychiatric: She has a normal mood and affect. Her behavior is normal.       Significant Labs: All pertinent labs within the past 24 hours have been reviewed.    Significant Imaging: I have reviewed all pertinent imaging results/findings within the past 24 hours.    Assessment/Plan:      * Acute on chronic respiratory failure with hypoxia  Acute shortness of breath  -Chronically on home O2 at 4L NC  -albuterol-ipratropium 2.5 mg-0.5 mg/3 mL 3 mL, Nebulization, Q6H and Q4 PRN  -I'm unsure what actually prompted the acute event in HD  -Regardless, it was transient and she has now returned to normal              -?Flash pulmonary edema with rapid BP shifts with HD              -?Acute CHF              -?Arrythmia or other cardiogenic issues              -?PTE              -?Infectious PNA              -?Acute bronchospastic event  -She denies any fever, chills, worsening cough (other than the brief episode)  -Have pacer interrogated in am> PPM captured no events during time of SOB  -Nephro to consider repeat HD for more fluid removal  -PTE unlikely given adequate anticoagulation and rapid self correction  -Repeat CXR relatively unchanged; pt afebrile and denies cough- low suspicion for  pna      Pulmonary sarcoidosis  -Ocular manifestations as well:              -prednisoLONE acetate 1 % ophthalmic suspension 1 drop, 1 drop, Left Eye, TID  -She follows with Dr. Rosas in Pulmonary Clinic here  -She is on Prednisone 20 mg PO qday at home, though she is unsure what her dose should b              -Weaning was supposed to be attempted, but apparently has not    Acute on chronic combined systolic and diastolic heart failure  · The estimated ejection fraction is 35% (3/11/19)  · Left ventricular diastolic dysfunction. (3/11/19)  -Followed in HTS clinic by Dr. Chavarria  -She used to be on adcirca but stopped due to symptomatic hypotension  -Fluid removal via HD  -On no ARB/ACE/BB due to recurrent hypotension      Pulmonary hypertension  -estimated PA systolic pressure is 56 mm Hg (3/11/19)  -Moderately reduced right ventricular systolic function      ESRD on hemodialysis  Consult Nephrology, though successfully had HD today  -cinacalcet tablet 30 mg, 30 mg, Oral, Daily with breakfast  -sevelamer carbonate tablet 800 mg, 800 mg, Oral, TID WM  -Home med held:              -patiromer calcium sorbitex (VELTASSA) 16.8 gram PwPk, Take 33.6 g by mouth once daily  K 5.5 this AM, started kayexalate while in house to substitute for Veltassa      AV graft malfunction  -HD TThS via LUE AVG (placed 2/3/16),               -Hx of PTA dilation of LUE AVG in late January 2019              -Hx of PTA dilation of LUE AVG 4/8/19  -aspirin EC tablet 81 mg, 81 mg, Oral, Daily  -clopidogrel tablet 75 mg, 75 mg, Oral, Daily  -Successful intervention of LUE AVG stenosis yesterday (5/8/19)      Leukocytosis  Chronic and stable in setting of steroid use      Diabetes mellitus type II, controlled  -Low dose SSI protocol in the hospital  -gabapentin capsule 600 mg, 600 mg, Oral, BID  -nortriptyline capsule 25 mg, 25 mg, Oral, Nightly  -pregabalin capsule 100 mg, 100 mg, Oral, TID  -Home medications held:              -TRADJENTA 5 mg Tab  tablet, TAKE 1 TABLET(5 MG) BY MOUTH EVERY DAY      Paroxysmal A-fib  Long term use of anticoagulants  Pacemaker in situ  -Successful JARAD/DCCV 6/27/2016  -warfarin (COUMADIN) tablet 5 mg, 5 mg, Oral, Daily  -Pt is currently rate controlled with HR <110              -In afib but V-paced  -[id number] score >2  -not on any rate control agents  -continue to monitor on telemetry  -continue to monitor INR daily: 2.8 today  -Maintain K > 4, Mg > 2      Orthostatic hypotension  Recurrent, severe  -fludrocortisone tablet 100 mcg, 100 mcg, Oral, BID  -midodrine tablet 10 mg, 10 mg, Oral, TID  -Difficult case as severe orthostatic hypotension precluding proper therapy              -Plan for GUICHO hose and abdominal binder  -Continue symptomatic management with HD as pt is anuric.   -She is not on BB, ACE/ARB or spironolactone due to hypotension   -Continue to monitor on telemetry  -Monitor intake and output and obtain daily STANDING weights  -Fluid restriction to 1L per day and cardiac diet with salt restriction      Secondary adrenal insufficiency  -Has seen Endocrine for this issue  -On fludricortisone; per note could taper once off prednisone but this is a long-term chronic medication  -Follow up outpatient with Endocrinology      Morbid obesity with BMI of 40.0-44.9, adult  -Weight loss counseling  -Not on CPAP or BiPAP      Seizure disorder  -levETIRAcetam tablet 500 mg, 500 mg, Oral, BID  -Seizure precautions      Hyperlipidemia  -atorvastatin tablet 80 mg, 80 mg, Oral, QHS      Anemia in ESRD (end-stage renal disease)  -vitamin renal formula (B-complex-vitamin c-folic acid) 1 mg per capsule 1 capsule, 1 capsule, Oral, Daily  -Home medication held; not on formulary:              -DENISE-LINDA 0.8 mg Tab, TAKE 1 TABLET BY MOUTH ONCE DAILY   -Epo as per Nephrology with HD      Gastric ulcer  Personal hx of gastric ulcer  -pantoprazole EC tablet 40 mg, 40 mg, Oral, Daily    Ulcer of right lower extremity with fat layer  exposed  -Wound care consult        VTE Risk Mitigation (From admission, onward)        Ordered     warfarin (COUMADIN) tablet 5 mg  Daily      05/09/19 1811     Place GUICHO hose  Until discontinued      05/09/19 1811     Place sequential compression device  Until discontinued      05/09/19 1811     IP VTE HIGH RISK PATIENT  Once      05/09/19 1811            Discussed with staff  Snow Vasquez PA-C  Department of Hospital Medicine   [facility]

## 2019-05-10 NOTE — ASSESSMENT & PLAN NOTE
Acute shortness of breath  -Chronically on home O2 at 4L NC  -albuterol-ipratropium 2.5 mg-0.5 mg/3 mL 3 mL, Nebulization, Q6H and Q4 PRN  -I'm unsure what actually prompted the acute event in HD  -Regardless, it was transient and she has now returned to normal              -?Flash pulmonary edema with rapid BP shifts with HD              -?Acute CHF              -?Arrythmia or other cardiogenic issues              -?PTE              -?Infectious PNA              -?Acute bronchospastic event  -She denies any fever, chills, worsening cough (other than the brief episode)  -Have pacer interrogated in am> PPM captured no events during time of SOB  -Nephro to consider repeat HD for more fluid removal  -PTE unlikely given adequate anticoagulation and rapid self correction  -Repeat CXR relatively unchanged; pt afebrile and denies cough- low suspicion for pna

## 2019-05-10 NOTE — CONSULTS
[facility]  Nephrology  Consult Note    Patient Name: Sisi Medel  MRN: [id number]  Admission Date: 5/9/2019  Hospital Length of Stay: 0 days  Attending Provider: BOB Thompson MD   Primary Care Physician: Carlos A Caputo MD  Principal Problem:Acute on chronic respiratory failure with hypoxia    Inpatient consult to Nephrology  Consult performed by: Dinh Rodriguez NP  Consult ordered by: BOB Thompson MD  Reason for consult: Management of ESRD while inpatient         Subjective:     HPI: Ms. Medel is a 57yo AAF with a past medical history of HTN, HLD, class 3 HFrEF, COPD on home 02 (4L), paroxysmal afib (on coumadin), pulmonary sarcoidosis, seizure disorder, T2DM, and ESRD on HD TTS via a LUE AVG. [place] [facility] [place] experiencing SOB after her outpatient HD treatment on 5/9/19. On arrival EMS noted that her Sp02 was in the 70's, and she was then placed on 100% non-rebreather. In the ED she was titrated off the non-rebreather to 4L nasal canula. In the ED the patient had a chest x-ray that was concerned for pulmonary edema pneumonia aspiration or sepsis. On assessment this morning, she still complains of a little SOB. Nephrology was consulted for management of ESRD while inpatient.     Past Medical History:   Diagnosis Date    Anemia in ESRD (end-stage renal disease) 3/7/2016    Anticoagulant long-term use     Cervical radiculopathy 7/20/2015    CHF (congestive heart failure)     Chronic combined systolic and diastolic heart failure 6/14/2013    EF 20% 2013, improved with Medical therapy, negative PET 2013    Chronic respiratory failure with hypoxia 04/22/2013    On home oxygen at 2-5 liters    Closed fracture of proximal end of right fibula 6/27/2016    Complications due to renal dialysis device, implant, and graft     DDD (degenerative disc disease), lumbar 6/17/2015    Dependence on renal dialysis     Diabetes mellitus type II, controlled 12/8/2017     ESRD on hemodialysis 2016    Essential hypertension     Gout     Lateral meniscal tear 2016    Lumbar stenosis 2015    Obesity, Class III, BMI 40-49.9 (morbid obesity)     Pacemaker     Paroxysmal atrial fibrillation 2014    Not on anticoagulation    Peripheral neuropathy 2013    Personal history of gastric ulcer 3/19/2013    Pneumonia of left lower lobe due to infectious organism 3/10/2019    Sarcoidosis, lung 2009    Diagnosed in  with ocular manifestation, on 4L home O2 and PO steroids    Secondary pulmonary hypertension 2015    Seizure disorder 3/19/2013    Shingles 2013    Thyroid disease        Past Surgical History:   Procedure Laterality Date    ABDOMINAL SURGERY      ABLATION N/A 2017    Performed by [facility]imir [facility]orno [facility] at [facility] CATH LAB    ANGIOPLASTY Left 1/10/2018    [facility]    ANGIOPLASTY-PERCUTANEOUS TRANSLUMINAL (PTA) Left 2017    Performed by [facility] at [facility] OR [facility] FLR    ANGIOPLASTY-PERCUTANEOUS TRANSLUMINAL (PTA) Left 10/12/2016    Performed by [facility] MD at [facility] OR [address] [place] [address]    CARDIAC PACEMAKER PLACEMENT       SECTION      x2    DECLOT GRAFT PERCUTANEOUS Left 2017    Performed by [facility]rey [facility] MD at [facility] OR 2ND FLR    DECLOT-GRAFT Left 2016    Performed by Harjit Starr MD at [facility] CATH LAB    DECLOT-GRAFT Left 2016    Performed by [facility]it Starr MD at [facility] CATH LAB    ECHOCARDIOGRAM-TRANSESOPHAGEAL N/A 2013    Performed by Delmy Surgeon at [facility] DELMY    Fistulogram Left 2019    Performed by [facility] MD at [facility] CATH LAB    fistulogram Left 1/10/2018    Performed by [facility] MD at [facility] OR [facility]    FISTULOGRAM Left 2017    Performed by [facility] MD at [facility] CATH LAB    FISTULOGRAM Left 10/12/2016    [facility]     FISTULOGRAM Left 6/21/2016    [facility] by [facility] at [facility] CATH LAB    Fistulogram, LUE AVG, possible intervention Left 1/30/2019    Performed by [facility] MD at [facility] CATH LAB    Fistulogram, OR 11 Left 5/8/2019    Performed by [facility] MD at [facility] OR [facility]    INJECTION-STEROID-EPIDURAL-TRANSFORAMINAL Right 7/20/2015    Performed by [facility]rez [facility] at [facility] PAIN MGT    INJECTION-STEROID-EPIDURAL-TRANSFORAMINAL Right 5/21/2015    [facility] at [facility] MGT    VHDPQCPSP-GOYSO-[id number] / Left upper AV graft. Left 2/3/2016    Performed by [facility] at [facility] OR 2[facility] FLR    CDDDGVZRT-ORGCZOJMY-NDWCSFRHFGWOJ N/A 1/11/2017    Performed by Bladimir Adorno MD at [facility] CATH LAB    OTHER SURGICAL HISTORY      loop recorder implant    PLACEMENT-STENT Left 2/7/2017    Performed by Torrey [facility]ana MD at [facility] OR [facility] FLR    PTA, AV FISTULA N/A 1/30/2019    Performed by [facility] MD at [facility] CATH LAB    stent to fistula      TRANSESOPHAGEAL ECHOCARDIOGRAM (JARAD) N/A 6/27/2016    Performed by Sourav Machuca MD at [facility] CATH LAB    ULTRASOUND, UPPER GI TRACT, ENDOSCOPIC N/A 1/9/2014    Performed by Stewart Burgess MD at [facility] ENDO (2ND FLR)    VASCULAR SURGERY      fistula to L upper arm        Review of patient's allergies indicates:   Allergen Reactions    Bactrim [sulfamethoxazole-trimethoprim] Other (See Comments)     Causes renal failure    Nsaids (non-steroidal anti-inflammatory drug) Other (See Comments)     Renal failure     Current Facility-Administered Medications   Medication Frequency    acetaminophen tablet 500 mg Q6H PRN    albuterol-ipratropium 2.5 mg-0.5 mg/3 mL nebulizer solution 3 mL Q6H    albuterol-ipratropium 2.5 mg-0.5 mg/3 mL nebulizer solution 3 mL Q4H PRN    aspirin EC tablet 81 mg Daily    atorvastatin tablet 80 mg QHS    cinacalcet tablet 30 mg Daily with breakfast    clopidogrel tablet 75 mg Daily     dextrose 50% injection 12.5 g PRN    dextrose 50% injection 25 g PRN    fludrocortisone tablet 100 mcg BID    gabapentin capsule 600 mg BID    glucagon (human recombinant) injection 1 mg PRN    glucose chewable tablet 16 g PRN    glucose chewable tablet 24 g PRN    insulin aspart U-100 pen 0-5 Units QID (AC + HS) PRN    levETIRAcetam tablet 500 mg BID    midodrine tablet 10 mg TID    nortriptyline capsule 25 mg Nightly    ondansetron disintegrating tablet 8 mg Q8H PRN    oxyCODONE immediate release tablet 5 mg Q6H PRN    pantoprazole EC tablet 40 mg Daily    prednisoLONE acetate 1 % ophthalmic suspension 1 drop TID    predniSONE tablet 20 mg Daily    pregabalin capsule 100 mg TID    promethazine tablet 25 mg Q6H PRN    ramelteon tablet 8 mg Nightly PRN    senna-docusate 8.6-50 mg per tablet 1 tablet BID    sevelamer carbonate tablet 800 mg TID WM    sodium chloride 0.9% flush 10 mL PRN    sodium polystyrene powder 15 g Daily    tiZANidine tablet 4 mg Daily PRN    vitamin renal formula (B-complex-vitamin c-folic acid) 1 mg per capsule 1 capsule Daily    warfarin (COUMADIN) tablet 5 mg Daily     Family History     Problem Relation (Age of Onset)    Coronary artery disease Father    Diabetes Mother, Father, Maternal Grandmother    Hypertension Mother, Father    Kidney failure Mother    Lupus Sister        Tobacco Use    Smoking status: Former Smoker     Packs/day: 0.50     Years: 10.00     Pack years: 5.00     Types: Cigarettes     Last attempt to quit: 1994     Years since quittin.0    Smokeless tobacco: Never Used   Substance and Sexual Activity    Alcohol use: No     Alcohol/week: 0.0 oz     Comment: rarely    Drug use: No    Sexual activity: Not Currently     Review of Systems   Constitutional: Negative for activity change, chills, diaphoresis, fatigue and fever.   Respiratory: Positive for shortness of breath. Negative for cough, choking, wheezing and stridor.     Cardiovascular: Positive for leg swelling. Negative for chest pain and palpitations.   Gastrointestinal: Negative for abdominal pain, diarrhea, nausea and vomiting.   Genitourinary: Positive for decreased urine volume. Negative for flank pain.   Skin: Negative.    Neurological: Negative.    Psychiatric/Behavioral: Negative.      Objective:     Vital Signs (Most Recent):  Temp: 98.7 °F (37.1 °C) (05/10/19 0738)  Pulse: 66 (05/10/19 1046)  Resp: 18 (05/10/19 0738)  BP: 93/62 (05/10/19 0738)  SpO2: (!) 94 % (05/10/19 0738)  O2 Device (Oxygen Therapy): nasal cannula (05/10/19 0738) Vital Signs (24h Range):  Temp:  [97.4 °F (36.3 °C)-98.7 °F (37.1 °C)] 98.7 °F (37.1 °C)  Pulse:  [66-80] 66  Resp:  [18-24] 18  SpO2:  [94 %-96 %] 94 %  BP: ()/(52-84) 93/62     Weight: 118.6 kg (261 lb 7.5 oz) (05/10/19 0626)  Body mass index is 39.76 kg/m².  Body surface area is 2.39 meters squared.    I/O last 3 completed shifts:  In: 240 [P.O.:240]  Out: 0     Physical Exam   Constitutional: She is oriented to person, place, and time. She appears well-developed and well-nourished. No distress.   HENT:   Head: Normocephalic and atraumatic.   Neck: Normal range of motion.   Cardiovascular: Normal rate and regular rhythm.   Pulmonary/Chest: Effort normal. No stridor. No respiratory distress. She has no wheezes. She has rales in the right lower field and the left lower field.   Abdominal: She exhibits no distension. There is no tenderness.   Musculoskeletal: Normal range of motion. She exhibits edema (+2 pitting edema to bilateral lower extremities).   Neurological: She is alert and oriented to person, place, and time.   Skin: Skin is warm and dry. She is not diaphoretic.   Psychiatric: She has a normal mood and affect. Her behavior is normal. Judgment and thought content normal.       Significant Labs:  CBC:   Recent Labs   Lab 05/10/19  0532   WBC 17.21*   RBC 4.72   HGB 12.5   HCT 41.6   *   MCV 88   MCH 26.5*   MCHC 30.0*      CMP:   Recent Labs   Lab 05/09/19  1439 05/10/19  0532   * 168*   CALCIUM 8.2* 8.3*   ALBUMIN 3.4*  --    PROT 8.3  --    * 131*   K 3.8 5.5*   CO2 24 25   CL 92* 90*   BUN 16 30*   CREATININE 3.6* 5.8*   ALKPHOS 189*  --    ALT 44  --    AST 42*  --    BILITOT 0.8  --      All labs within the past 24 hours have been reviewed.        Assessment/Plan:     ESRD on hemodialysis  Ms. Medel is a 57yo AAF with a past medical history of ESRD that presented to the ED with complaints of SOB after her HD treatment on 5/9/19. She was initially on 100% non-rebreather, but has been titrated to 4L nasal canula. She still complains of having a little SOB and bilateral leg swelling. Chest Xray concerning for pulmonary edema pneumonia aspiration or sepsis.    Outpatient HD information:  -Outpatient HD unit: [facility]   -HD tx days: TTS  -HD tx time: 4hrs 45mins  -HD access: RAISSA AVG  -Last HD: 5/9/19  -HD modality: iHD  -Residual urine: none   -EDW: 115.5kg    Plan:  -Will dialyze today for metabolic clearance and volume management  -Target UF 2-3L as tolerated, keep map >65   -Recommend daily phos levels and daily renal function panels   -Renal diet  -800ml/24hr fluid restriction   -Strict I/O's and daily weights   -Hgb 12.5, at goal of >10  -Phos 4.3, continue binders   -Will continue to follow                Thank you for your consult. I will follow-up with patient. Please contact us if you have any additional questions.    Dinh Rodriguez, NP  Nephrology  [facility]

## 2019-05-10 NOTE — PLAN OF CARE
Problem: Adult Inpatient Plan of Care  Goal: Plan of Care Review  Outcome: Ongoing (interventions implemented as appropriate)  Pt in bed appear asleep. NADN. RR even and unlabored. Plan of care and meds reviewed with pt, voiced understanding. Safety maintained. Will continue to monitor pt.

## 2019-05-11 NOTE — PLAN OF CARE
Problem: Adult Inpatient Plan of Care  Goal: Plan of Care Review  Outcome: Outcome(s) achieved Date Met: 05/11/19  POC reviewed with Pt.  Pt on 3L O2 NC, telemetry monitor in place. Safety precautions maintained. Bed in low position wheels locked. Side rails up x 2. Call light within reach. Pt free of falls.

## 2019-05-11 NOTE — PLAN OF CARE
Problem: Adult Inpatient Plan of Care  Goal: Plan of Care Review  Outcome: Ongoing (interventions implemented as appropriate)  I have reviewed the plan of care with the patient. Patient's vital signs have been within normal limits, and she has remained in a paced rhythm on telemetry all shift (60-70bpm). She has not experienced a change in level of conciousness, and at the time of this writing, no fall with injury has occurred. Bed down in lowest position, call light within reach, side rails up x2.

## 2019-05-11 NOTE — NURSING
Pt Dc per MD orders. Dc instructions reviewed with Pt. Pt verbalized understanding PIV removed catheter tip intact. VSS Pt transported by  with home O2 @ 3L. Accompanied by family.

## 2019-05-11 NOTE — PLAN OF CARE
[facility]    HOME HEALTH ORDERS  FACE TO FACE ENCOUNTER    Patient Name: Sisi Medel  YOB: 1960    PCP: Carlos A Caputo MD   PCP Address: [address] / [place] [address]  PCP Phone Number: [phone]  PCP Fax: [phone]    Encounter Date: 05/11/2019    Admit to Home Health    Diagnoses:  Active Hospital Problems    Diagnosis  POA    *Acute on chronic respiratory failure with hypoxia [J96.21]  Yes     Priority: 1 - High    Pulmonary sarcoidosis [D86.0]  Yes     Priority: 2     Acute on chronic combined systolic and diastolic heart failure [I50.43]  Yes     Priority: 3     Pulmonary hypertension [I27.20]  Yes     Priority: 4      Chronic    ESRD on hemodialysis [N18.6, Z99.2]  Not Applicable     Priority: 5     AV graft malfunction [T82.590A]  Yes     Priority: 5     Leukocytosis [D72.829]  Yes     Priority: 6     Diabetes mellitus type II, controlled [E11.9]  Yes     Priority: 7     Paroxysmal A-fib [I48.0]  Yes     Priority: 8     Orthostatic hypotension [I95.1]  Yes     Priority: 9     Secondary adrenal insufficiency [E27.49]  Yes     Priority: 10     Morbid obesity with BMI of 40.0-44.9, adult [E66.01, Z68.41]  Not Applicable     Priority: 11      Chronic    Seizure disorder [G40.909]  Yes     Priority: 12     Hyperlipidemia [E78.5]  Yes     Priority: 13     Anemia in ESRD (end-stage renal disease) [N18.6, D63.1]  Yes     Priority: 14     Cervical radiculopathy [M54.12]  Yes     Priority: 15     Gastric ulcer [K25.9]  Yes     Priority: 16     Ulcer of right lower extremity with fat layer exposed [L97.912]  Yes     Priority: 17     Cor pulmonale [I27.81]  Yes    Diabetic ulcer of both feet [E11.621, L97.519, L97.529]  Yes    Chronic kidney disease-mineral and bone disorder [N18.9, E83.9, M89.9]  Yes    Shortness of breath [R06.02]  Yes      Resolved Hospital Problems   No resolved problems to display.       Future Appointments    Date Time Provider Department Center   5/13/2019  2:30 PM Claudia Ron DPM [facility] POD [facility]   5/15/2019 12:30 PM PULMONARY FUNCTION [facility] PULMLAB [facility]   5/15/2019  1:00 [facility]   5/15/2019  1:15 PM PULMONARY FUNCTION [facility] PULMLAB [facility]   5/15/2019  2:30 PM Jj Altamirano MD [facility] PULMSVC [facility]   5/24/2019  7:20 AM Dorita Haywood, IVETH [facility] WOUND [facility]   5/24/2019  8:00 AM Emeka Lester MD [facility] NEURO [facility]   5/29/2019  9:00 AM [facility] EMG PROCEDURAL LAB [facility] NEURO [facility]   5/31/2019  3:00 PM Carlos A Caputo MD [facility] IM [facility]   7/1/2019  9:00 AM Annette Hernandez NP [facility] ENDODIA [facility]   7/2/2019  8:00 AM HOME MONITOR DEVICE CHECK, [facility] ARRHPRO [facility]   7/5/2019  9:00 AM Carlos A Caputo MD [facility] IM [facility] PCW   10/1/2019  8:00 AM HOME MONITOR DEVICE CHECK, [facility] ARRHPRO [facility]     Follow-up Information     [facility] - Pulmonary Lab On 5/15/2019.    Specialty:  Pulmonology  Why:  at 12:30pm for pulmonary function tests  Contact information:  [address]  [facility] [id number] [phone]  Additional information:  9th Floor           Jj Altamirano MD On 5/15/2019.    Specialty:  Pulmonary Disease  Why:  at 2:30pm  Contact information:  [address]  [facility] [address]  [phone]             Carlos A Caputo MD On 5/31/2019.    Specialty:  Internal Medicine  Why:  at 3:00pm  Contact information:  [address]  [facility] [address]  [phone]                     I have seen and examined this patient face to face today. My clinical findings that support the need for the home health skilled services and home bound status are the following:  Weakness/numbness causing balance and gait disturbance due to Weakness/Debility making it taxing to leave home.    Allergies:  Review of patient's allergies indicates:   Allergen Reactions    Bactrim [sulfamethoxazole-trimethoprim] Other (See Comments)      Causes renal failure    Nsaids (non-steroidal anti-inflammatory drug) Other (See Comments)     Renal failure       Diet: diabetic diet: 1800 calorie, renal diet and fluid restriction: 1000 mL    Activities: activity as tolerated    Nursing:   SN to complete comprehensive assessment including routine vital signs. Instruct on disease process and s/s of complications to report to MD. Review/verify medication list sent home with the patient at time of discharge  and instruct patient/caregiver as needed. Frequency may be adjusted depending on start of care date.    Notify MD if SBP > 160 or < 90; DBP > 90 or < 50; HR > 120 or < 50; Temp > 101      CONSULTS:    Physical Therapy to evaluate and treat. Evaluate for home safety and equipment needs; Establish/upgrade home exercise program. Perform / instruct on therapeutic exercises, gait training, transfer training, and Range of Motion.  Occupational Therapy to evaluate and treat. Evaluate home environment for safety and equipment needs. Perform/Instruct on transfers, ADL training, ROM, and therapeutic exercises.    MISCELLANEOUS CARE:  Diabetic Care:   SN to perform and educate Diabetic management with blood glucose monitoring: and Report CBG < 60 or > 350 to physician. Fingerstick blood sugar AC and HS    WOUND CARE ORDERS  Keep R leg ulcers clean and dry      Medications: Review discharge medications with patient and family and provide education.      Current Discharge Medication List      CONTINUE these medications which have NOT CHANGED    Details   aspirin (ECOTRIN) 81 MG EC tablet Take 81 mg by mouth once daily.      atorvastatin (LIPITOR) 80 MG tablet TAKE 1 TABLET BY MOUTH EVERY EVENING  Qty: 90 tablet, Refills: 1    Associated Diagnoses: Chronic combined systolic and diastolic heart failure      blood sugar diagnostic Strp 1 each by Misc.(Non-Drug; Combo Route) route once daily.  Qty: 100 each, Refills: 3    Comments: Please provide test strips covered by  patient's insurance  Associated Diagnoses: Uncontrolled type 2 diabetes mellitus with hyperglycemia      cinacalcet (SENSIPAR) 30 MG Tab Take 30 mg by mouth daily with breakfast.      clopidogrel (PLAVIX) 75 mg tablet TAKE 1 TABLET(75 MG) BY MOUTH EVERY DAY  Qty: 90 tablet, Refills: 0    Comments: **Patient requests 90 days supply**      fludrocortisone (FLORINEF) 0.1 mg Tab Take 1 tablet (100 mcg total) by mouth 2 (two) times daily.  Qty: 60 tablet, Refills: 5      gabapentin (NEURONTIN) 600 MG tablet Take 600 mg by mouth 2 (two) times daily.       lancets Misc 1 each by Misc.(Non-Drug; Combo Route) route once daily.  Qty: 100 each, Refills: 3    Comments: Please provide lancets covered by patient's insurance  Associated Diagnoses: Uncontrolled type 2 diabetes mellitus with hyperglycemia      levETIRAcetam (KEPPRA) 500 MG Tab TAKE 1 TABLET BY MOUTH TWICE DAILY  Qty: 180 tablet, Refills: 3    Associated Diagnoses: Seizure disorder      midodrine (PROAMATINE) 10 MG tablet Take 1 tablet (10 mg total) by mouth 3 (three) times daily.  Qty: 90 tablet, Refills: 11      nortriptyline (PAMELOR) 25 MG capsule Take 1 capsule by mouth nightly  Refills: 1      omeprazole (PRILOSEC) 20 MG capsule TAKE 1 CAPSULE BY MOUTH EVERY DAY  Qty: 90 capsule, Refills: 4    Comments: **Patient requests 90 days supply**  Associated Diagnoses: Gastroesophageal reflux disease with esophagitis      patiromer calcium sorbitex (VELTASSA) 16.8 gram PwPk Take 33.6 g by mouth once daily.       prednisoLONE acetate (PRED FORTE) 1 % DrpS INSTILL ONE DROP INTO  LEFT EYE THREE TIMES A DAY  Refills: 3      predniSONE (DELTASONE) 20 MG tablet Take 0.5 tablets (10 mg total) by mouth once daily.  Qty: 30 tablet, Refills: 2      pregabalin (LYRICA) 100 MG capsule Take 1 capsule (100 mg total) by mouth 3 (three) times daily.  Qty: 270 capsule, Refills: 1    Associated Diagnoses: Polyneuropathy associated with underlying disease      DENISE-LINDA 0.8 mg Tab TAKE 1  TABLET BY MOUTH ONCE DAILY  Refills: 0      RENVELA 800 mg Tab TAKE 1 TABLET BY MOUTH THREE TIMES DAILY WITH MEALS  Qty: 90 tablet, Refills: 0      tiZANidine (ZANAFLEX) 4 MG tablet TAKE 1 TABLET BY MOUTH EVERY DAY AS NEEDED FOR MUSCLE SPASMS  Qty: 90 tablet, Refills: 0    Associated Diagnoses: Muscle spasm      TRADJENTA 5 mg Tab tablet TAKE 1 TABLET(5 MG) BY MOUTH EVERY DAY  Qty: 90 tablet, Refills: 1    Associated Diagnoses: Controlled type 2 diabetes mellitus with complication, without long-term current use of insulin      warfarin (COUMADIN) 5 MG tablet TAKE 1 TABLET BY MOUTH EVERY DAY EXCEPT TH 1AND 1/2 TABLETS ON MONDAY AND FRIDAY OR AS DIRECTED  Qty: 120 tablet, Refills: 0    Comments: **Patient requests 90 days supply**  Associated Diagnoses: Long term current use of anticoagulant therapy; Paroxysmal atrial fibrillation             I certify that this patient is confined to her home and needs physical therapy and occupational therapy.

## 2019-05-11 NOTE — PROGRESS NOTES
HD completed @1834 blood returned 15g needles removed and pressure held to each site [id number] until hemostasis achieved.  UF REMOVED 2L      POST HD .5kg

## 2019-05-11 NOTE — PLAN OF CARE
CM received call from NIDHI - pt is ready for d/c and needs to resume h/h w/ [facility]. Pt has had no meds changes and is in OBS therefore h/h orders forwarded to [facility] h/[facility] via .     05/11/19 1026   Final Note   Assessment Type Final Discharge Note   Anticipated Discharge Disposition Home-Health   What phone number can be called within the next 1-3 days to see how you are doing after discharge? [phone]   Right Care Referral Info   Post Acute Recommendation Home-care   Referral Type [facility]

## 2019-05-11 NOTE — CONSULTS
Wound care consulted for chronic wounds to BLE, being treated by wound care and podiatry outpatient by RN.   The compression wraps were removed. The legs cleansed with cleansing wraps. The wounds were covered with Aquacel Ag rope and secured with aquacel foam dressings to both lower legs. The patient tolerated well and understood the wound care instructions.    Recommendations  BLE- nursing to cleanse with cleansing cloths, dry, apply Aquacel Ag rope over wounds then secure with foam dressings every Tuesday/Fri.   Continue follow-up with outpatient wound/podiatry care.   Nursing to continue care. Wound care will follow up prn.   Right 1st toe  1 cm L x 2 cm W x 0.0 D    Right lower leg- 2 wounds resurfaced    Right lateral lower leg 0.8 cm L x 0.3cm W x 0.2 cm D    Left 1st toe

## 2019-05-13 NOTE — PLAN OF CARE
05/13/19 0727   Final Note   Assessment Type Final Discharge Note     Patient discharged home with no needs on 5/11/19.

## 2019-05-13 NOTE — PROGRESS NOTES
Subjective:      Patient ID: Sisi Medel is a 58 y.o. female.    Chief Complaint: Diabetes Mellitus (03/01/2019) and Diabetic Foot Exam    Pt here today for multiple wounds on lower extremities. Denies any NVFCSOB chest or calf pain. Pt has a nurse changing dressings 3 times a week.     5/13/2019 Pt presents today for wound care. Pt states she has been in the hospital recently, for breathing issues. Pt denies any NVFCSOB.     Past Medical History:   Diagnosis Date    Anemia in ESRD (end-stage renal disease) 3/7/2016    Anticoagulant long-term use     Cervical radiculopathy 7/20/2015    CHF (congestive heart failure)     Chronic combined systolic and diastolic heart failure 6/14/2013    EF 20% 2013, improved with Medical therapy, negative PET 2013    Chronic respiratory failure with hypoxia 04/22/2013    On home oxygen at 2-5 liters    Closed fracture of proximal end of right fibula 6/27/2016    Complications due to renal dialysis device, implant, and graft     DDD (degenerative disc disease), lumbar 6/17/2015    Dependence on renal dialysis     Diabetes mellitus type II, controlled 12/8/2017    ESRD on hemodialysis 2/23/2016    Essential hypertension     Gout     Lateral meniscal tear 5/31/2016    Lumbar stenosis 6/17/2015    Obesity, Class III, BMI 40-49.9 (morbid obesity)     Pacemaker     Paroxysmal atrial fibrillation 5/16/2014    Not on anticoagulation    Peripheral neuropathy 11/13/2013    Personal history of gastric ulcer 3/19/2013    Pneumonia of left lower lobe due to infectious organism 3/10/2019    Sarcoidosis, lung 2009    Diagnosed in 2009 with ocular manifestation, on 4L home O2 and PO steroids    Secondary pulmonary hypertension 4/7/2015    Seizure disorder 3/19/2013    Shingles 11/13/2013    Thyroid disease            Current Outpatient Medications on File Prior to Visit   Medication Sig Dispense Refill    aspirin (ECOTRIN) 81 MG EC tablet Take 81 mg by mouth  once daily.      atorvastatin (LIPITOR) 80 MG tablet TAKE 1 TABLET BY MOUTH EVERY EVENING 90 tablet 1    blood sugar diagnostic Strp 1 each by Misc.(Non-Drug; Combo Route) route once daily. (Patient taking differently: 1 each by Misc.(Non-Drug; Combo Route) route 2 (two) times daily. ) 100 each 3    cinacalcet (SENSIPAR) 30 MG Tab Take 30 mg by mouth daily with breakfast.      clopidogrel (PLAVIX) 75 mg tablet TAKE 1 TABLET(75 MG) BY MOUTH EVERY DAY 90 tablet 0    fludrocortisone (FLORINEF) 0.1 mg Tab Take 1 tablet (100 mcg total) by mouth 2 (two) times daily. 60 tablet 5    gabapentin (NEURONTIN) 600 MG tablet Take 600 mg by mouth 2 (two) times daily.       lancets Misc 1 each by Misc.(Non-Drug; Combo Route) route once daily. (Patient taking differently: 1 each by Misc.(Non-Drug; Combo Route) route 2 (two) times daily. ) 100 each 3    levETIRAcetam (KEPPRA) 500 MG Tab TAKE 1 TABLET BY MOUTH TWICE DAILY 180 tablet 3    midodrine (PROAMATINE) 10 MG tablet Take 1 tablet (10 mg total) by mouth 3 (three) times daily. 90 tablet 11    nortriptyline (PAMELOR) 25 MG capsule Take 1 capsule by mouth nightly  1    omeprazole (PRILOSEC) 20 MG capsule TAKE 1 CAPSULE BY MOUTH EVERY DAY 90 capsule 4    patiromer calcium sorbitex (VELTASSA) 16.8 gram PwPk Take 33.6 g by mouth once daily.       prednisoLONE acetate (PRED FORTE) 1 % DrpS INSTILL ONE DROP INTO  LEFT EYE THREE TIMES A DAY  3    predniSONE (DELTASONE) 20 MG tablet Take 0.5 tablets (10 mg total) by mouth once daily. 30 tablet 2    pregabalin (LYRICA) 100 MG capsule Take 1 capsule (100 mg total) by mouth 3 (three) times daily. 270 capsule 1    DENISE-LINDA 0.8 mg Tab TAKE 1 TABLET BY MOUTH ONCE DAILY  0    RENVELA 800 mg Tab TAKE 1 TABLET BY MOUTH THREE TIMES DAILY WITH MEALS 90 tablet 0    tiZANidine (ZANAFLEX) 4 MG tablet TAKE 1 TABLET BY MOUTH EVERY DAY AS NEEDED FOR MUSCLE SPASMS 90 tablet 0    TRADJENTA 5 mg Tab tablet TAKE 1 TABLET(5 MG) BY MOUTH  EVERY DAY 90 tablet 1    warfarin (COUMADIN) 5 MG tablet TAKE 1 TABLET BY MOUTH EVERY DAY EXCEPT TH 1AND 1/2 TABLETS ON MONDAY AND FRIDAY OR AS DIRECTED (Patient taking differently: TAKE 1 TABLET BY MOUTH EVERY DAY OR AS DIRECTED) 120 tablet 0     No current facility-administered medications on file prior to visit.            Review of patient's allergies indicates:   Allergen Reactions    Bactrim [sulfamethoxazole-trimethoprim] Other (See Comments)     Causes renal failure    Nsaids (non-steroidal anti-inflammatory drug) Other (See Comments)     Renal failure           Social History     Socioeconomic History    Marital status: Single     Spouse name: Not on file    Number of children: Not on file    Years of education: Not on file    Highest education level: Not on file   Occupational History    Not on file   Social Needs    Financial resource strain: Not on file    Food insecurity:     Worry: Not on file     Inability: Not on file    Transportation needs:     Medical: Not on file     Non-medical: Not on file   Tobacco Use    Smoking status: Former Smoker     Packs/day: 0.50     Years: 10.00     Pack years: 5.00     Types: Cigarettes     Last attempt to quit: 1994     Years since quittin.0    Smokeless tobacco: Never Used   Substance and Sexual Activity    Alcohol use: No     Alcohol/week: 0.0 oz     Comment: rarely    Drug use: No    Sexual activity: Not Currently   Lifestyle    Physical activity:     Days per week: Not on file     Minutes per session: Not on file    Stress: Not on file   Relationships    Social connections:     Talks on phone: Not on file     Gets together: Not on file     Attends [employer] service: Not on file     Active member of club or organization: Not on file     Attends meetings of clubs or organizations: Not on file     Relationship status: Not on file   Other Topics Concern    Are you pregnant or think you may be? No    Breast-feeding No   Social History  "Narrative    Lives with boyfriend/partner who helps with her care.            Review of Systems   Constitution: Negative for chills, diaphoresis, fever, malaise/fatigue and night sweats.   Cardiovascular: Positive for leg swelling. Negative for claudication and cyanosis.   Skin: Positive for color change, dry skin, nail changes, poor wound healing and unusual hair distribution.   Musculoskeletal:        Abnormal gait, uses rolling walker.    Neurological: Positive for numbness and sensory change. Negative for paresthesias, tremors and weakness.           Objective:        Vitals:    05/13/19 1420   BP: 110/74   Pulse: 69   Weight: 114.3 kg (252 lb)   Height: 5' 9.6" (1.768 m)           Physical Exam   Cardiovascular:   Pulses:       Dorsalis pedis pulses are 0 on the right side, and 0 on the left side.        Posterior tibial pulses are 1+ on the right side, and 1+ on the left side.   Diffuse non pitting edema b/L     Musculoskeletal:    Amputations noted to right hallux and right 2nd digit   Feet:   Right Foot:   Protective Sensation: 5 sites tested. 0 sites sensed.   Left Foot:   Protective Sensation: 5 sites tested. 0 sites sensed.   Lymphadenopathy:   Negative lymphadenopathy bilateral popliteal fossa and tarsal tunnel.  Negative lymphangitic streaking bilateral foot/ankle bilateral.     Neurological:   Absent/loss of protective sensation all toes bilateral to 10 gram monofilament.     Skin: Lesion noted.   Nails x8 are short and dystrophic    Ulceration  Location: right lateral leg  Measurements: 1.0x 1.0x 0.2cm  Drainage: serous  Wound base: fibrogranular  SOI: none    Ulceration  Location: left medial hallux  Measurements: 0.5x 0.5x 0.1cm  Drainage: serous  Wound base: fibrogranlar  SOI: none                                   Assessment:       Encounter Diagnoses   Name Primary?    Polyneuropathy associated with underlying disease Yes    Ulcer of right lower extremity with fat layer exposed          Plan: "       Sisi was seen today for diabetes mellitus and diabetic foot exam.    Diagnoses and all orders for this visit:    Polyneuropathy associated with underlying disease    Ulcer of right lower extremity with fat layer exposed      I counseled the patient on her conditions, their implications and medical management.    Ulceration on right foot and leg debrided through sub-q tissue using an tissue nipper. Ulceration debrided down to healthy tissue. Pt tolerated debridement well.    orders updated  Football dressing applied to pts b/L foot and wrapping to right leg,  by Sadie Chauhan MA under my direct supervision. Pt tolerated dressing well.   RTC in 2 weeks or sooner if any new pedal problems arise, any signs of infection occur, or if condition worsens.

## 2019-05-14 NOTE — DISCHARGE SUMMARY
[facility] Hospital Medicine  Discharge Summary      Patient Name: Sisi Medel  MRN: [id number]  Admission Date: 5/9/2019  Hospital Length of Stay: 0 days  Discharge Date and Time: 5/11/2019  1:20 PM  Attending Physician: No att. providers found   Discharging Provider: Snow Vasquez PA-C  Primary Care Provider: Carlos A Caputo MD  [facility] Medicine Team: [facility] HOSP MED E Snow Vasquez PA-C    HPI:    Ms. Medel is a 59yo lady well known to me from multiple past admissions.  She has multiple medical problems:  pulmonary sarcoidosis on 5 L home oxygen, Class 3 HFrEF (3/2019 EF 35%), perm Afib/flutter s/p 6/2017 AVN ablation & PPM, ESRD (TTS), Type 2 DM (3/2019 A1c 6.2%), pulmonary HTN, YOANDY, and recently diagnosed renal insufficiency secondary to chronic steroid therapy  and orthostatic hypotension.        She has recently been having issues with her left upper arm graft with the arterial access point, so Dr. Villafana performed a fistulagram with retrograde access from distal portion of graft vs L transradial area.  This was done yesterday (5/8).  I'm not able to tell in Epic what they actually found or performed as of yet on that procedure, but the patient states since then she was able to undergo HD today with no issues.     In fact, when she went to HD today, she reports feeling absolutely normal and at her baseline.  During HD she had major BP fluctuations (not terribly uncommon for her), with her SBP dropping to 80 at one point.  At the end of HD she suddenly felt weak and dizzy, then became very short of breath.  Her HD sessions are usually 4-5 hours due to her BP fluctuations.  When she became short of breath, her oxygen saturation was checked and found to be in the 70's.  EMT was called and placed her on a NRB mask, arriving at the ED saturating 100%.  After arriving in the ED, she was immediately able to be titrated back down to her normal nasal canula at 4L saturating  94% with resolution of all of her symptoms.  Around the same time that she became short of breath, she also developed some chest pressure, but that has also now resolved.  During the HD and SOB period, she did have some prolonged coughing spells.    * No surgery found *      Hospital Course:   Pt admitted for transient SOB after HD. Unclear etiology in cause of SOB. EKG shows paced rhythm with underlying aflutter. Troponin at baseline and flat x 3. CXR shows pulm edema/CHF pattern but cannot exclude interstitial pneumonia. Pt afebrile without leukocytosis, no cough. PPM to be interrogated, no events noted during time of SOB. Pt received HD without complication. Pt maintained sats on home O2 requirements during stay, no further episodes of SOB. Stable for dc with PCP fu. Return precuations discussed. No further questions at d/c.      Consults:   Consults (From admission, onward)        Status Ordering Provider     Inpatient consult to Nephrology  Once     Provider:  (Not yet assigned)    Completed BOB BUCKLEY          * Acute on chronic respiratory failure with hypoxia  Acute shortness of breath  -Chronically on home O2 at 4L NC  -albuterol-ipratropium 2.5 mg-0.5 mg/3 mL 3 mL, Nebulization, Q6H and Q4 PRN  -I'm unsure what actually prompted the acute event in HD  -Regardless, it was transient and she has now returned to normal              -?Flash pulmonary edema with rapid BP shifts with HD              -?Acute CHF              -?Arrythmia or other cardiogenic issues              -?PTE              -?Infectious PNA              -?Acute bronchospastic event  -She denies any fever, chills, worsening cough (other than the brief episode)  -Have pacer interrogated in am> PPM captured no events during time of SOB  -Nephro repeat edHD for more fluid removal  -PTE unlikely given adequate anticoagulation and rapid self correction  -Repeat CXR relatively unchanged; pt afebrile and denies cough- low suspicion for pna  No  further episodes of hypoxia or SOB, stable for dc with return precautions discussed; PCP fu; pt voiced understnading      Pulmonary sarcoidosis  -Ocular manifestations as well:              -prednisoLONE acetate 1 % ophthalmic suspension 1 drop, 1 drop, Left Eye, TID  -She follows with Dr. Rosas in Pulmonary Clinic here  -She is on Prednisone 20 mg PO qday at home, though she is unsure what her dose should b              -Weaning was supposed to be attempted, but apparently has not    Acute on chronic combined systolic and diastolic heart failure  · The estimated ejection fraction is 35% (3/11/19)  · Left ventricular diastolic dysfunction. (3/11/19)  -Followed in HTS clinic by Dr. Chavarria  -She used to be on adcirca but stopped due to symptomatic hypotension  -Fluid removal via HD  -On no ARB/ACE/BB due to recurrent hypotension      Pulmonary hypertension  -estimated PA systolic pressure is 56 mm Hg (3/11/19)  -Moderately reduced right ventricular systolic function      ESRD on hemodialysis  Consult Nephrology, though successfully had HD today  -cinacalcet tablet 30 mg, 30 mg, Oral, Daily with breakfast  -sevelamer carbonate tablet 800 mg, 800 mg, Oral, TID WM  -Home med held:              -patiromer calcium sorbitex (VELTASSA) 16.8 gram PwPk, Take 33.6 g by mouth once daily  K 5.5 this AM, started kayexalate while in house to substitute for Veltassa> K 3.8 @ dc      AV graft malfunction  -HD TThS via LUE AVG (placed 2/3/16),               -Hx of PTA dilation of LUE AVG in late January 2019              -Hx of PTA dilation of LUE AVG 4/8/19  -aspirin EC tablet 81 mg, 81 mg, Oral, Daily  -clopidogrel tablet 75 mg, 75 mg, Oral, Daily  -Successful intervention of LUE AVG stenosis yesterday (5/8/19)      Leukocytosis  Chronic and stable in setting of steroid use      Diabetes mellitus type II, controlled  -Low dose SSI protocol in the hospital  -gabapentin capsule 600 mg, 600 mg, Oral, BID  -nortriptyline capsule 25 mg,  25 mg, Oral, Nightly  -pregabalin capsule 100 mg, 100 mg, Oral, TID  -Home medications held:              -TRADJENTA 5 mg Tab tablet, TAKE 1 TABLET(5 MG) BY MOUTH EVERY DAY      Paroxysmal A-fib  Long term use of anticoagulants  Pacemaker in situ  -Successful JARAD/DCCV 6/27/2016  -warfarin (COUMADIN) tablet 5 mg, 5 mg, Oral, Daily  -Pt is currently rate controlled with HR <110              -In afib but V-paced  -[id number] score >2  -not on any rate control agents  -continue to monitor on telemetry  -continue to monitor INR daily: 2.8 today  -Maintain K > 4, Mg > 2      Orthostatic hypotension  Recurrent, severe  -fludrocortisone tablet 100 mcg, 100 mcg, Oral, BID  -midodrine tablet 10 mg, 10 mg, Oral, TID  -Difficult case as severe orthostatic hypotension precluding proper therapy              -Plan for GUICHO hose and abdominal binder  -Continue symptomatic management with HD as pt is anuric.   -She is not on BB, ACE/ARB or spironolactone due to hypotension   -Continue to monitor on telemetry  -Monitor intake and output and obtain daily STANDING weights  -Fluid restriction to 1L per day and cardiac diet with salt restriction      Secondary adrenal insufficiency  -Has seen Endocrine for this issue  -On fludricortisone; per note could taper once off prednisone but this is a long-term chronic medication  -Follow up outpatient with Endocrinology      Morbid obesity with BMI of 40.0-44.9, adult  -Weight loss counseling  -Not on CPAP or BiPAP      Seizure disorder  -levETIRAcetam tablet 500 mg, 500 mg, Oral, BID  -Seizure precautions      Hyperlipidemia  -atorvastatin tablet 80 mg, 80 mg, Oral, QHS      Anemia in ESRD (end-stage renal disease)  -vitamin renal formula (B-complex-vitamin c-folic acid) 1 mg per capsule 1 capsule, 1 capsule, Oral, Daily  -Home medication held; not on formulary:              -DENISE-LINDA 0.8 mg Tab, TAKE 1 TABLET BY MOUTH ONCE DAILY   -Epo as per Nephrology with HD      Cervical  radiculopathy        Gastric ulcer  Personal hx of gastric ulcer  -pantoprazole EC tablet 40 mg, 40 mg, Oral, Daily    Ulcer of right lower extremity with fat layer exposed  -continue HHC and woundcare outpt        Final Active Diagnoses:    Diagnosis Date Noted POA    PRINCIPAL PROBLEM:  Acute on chronic respiratory failure with hypoxia [J96.21] 01/19/2016 Yes    Pulmonary sarcoidosis [D86.0] 03/19/2013 Yes    Acute on chronic combined systolic and diastolic heart failure [I50.43] 03/24/2019 Yes    Pulmonary hypertension [I27.20] 04/07/2015 Yes     Chronic    ESRD on hemodialysis [N18.6, Z99.2]  Not Applicable    AV graft malfunction [T82.590A] 08/22/2018 Yes    Leukocytosis [D72.829] 04/07/2015 Yes    Diabetes mellitus type II, controlled [E11.9] 12/08/2017 Yes    Paroxysmal A-fib [I48.0] 05/10/2019 Yes    Orthostatic hypotension [I95.1] 03/25/2019 Yes    Secondary adrenal insufficiency [E27.49] 02/04/2017 Yes    Morbid obesity with BMI of 40.0-44.9, adult [E66.01, Z68.41] 08/15/2013 Not Applicable     Chronic    Seizure disorder [G40.909] 03/19/2013 Yes    Hyperlipidemia [E78.5] 03/07/2016 Yes    Anemia in ESRD (end-stage renal disease) [N18.6, D63.1] 05/10/2019 Yes    Cervical radiculopathy [M54.12] 07/20/2015 Yes    Gastric ulcer [K25.9] 05/10/2019 Yes    Ulcer of right lower extremity with fat layer exposed [L97.912] 01/11/2019 Yes    Cor pulmonale [I27.81] 05/10/2019 Yes    Diabetic ulcer of both feet [E11.621, L97.519, L97.529] 05/10/2019 Yes    Chronic kidney disease-mineral and bone disorder [N18.9, E83.9, M89.9]  Yes    Shortness of breath [R06.02] 05/09/2019 Yes      Problems Resolved During this Admission:       Discharged Condition: stable    Disposition: Home or Self Care    Follow Up:  Follow-up Information     [facility] - Pul[facility] On 5/15/2019.    Specialty:  Pulmonology  Why:  at 12:30pm for pulmonary function tests  Contact information:  [address]  [place]  [place] [address]  [phone]  Additional information:  9th Floor           Jj Altamirano MD On 5/15/2019.    Specialty:  Pulmonary Disease  Why:  at 2:30pm  Contact information:  [address]  [facility] [address]  [phone]             Carlos A Caputo MD On 5/31/2019.    Specialty:  Internal Medicine  Why:  at 3:00pm  Contact information:  [address]  [facility] [address]  [phone]             [facility] [place].    Specialties:  Home Health Services, Physical Therapy, Occupational Therapy  Why:  Home Health  Contact information:  [address]  [facility] [address]  [phone]                 Patient Instructions:      Notify your health care provider if you experience any of the following:  temperature >100.4     Notify your health care provider if you experience any of the following:  severe uncontrolled pain     Notify your health care provider if you experience any of the following:  redness, tenderness, or signs of infection (pain, swelling, redness, odor or green/yellow discharge around incision site)     Notify your health care provider if you experience any of the following:  difficulty breathing or increased cough     Notify your health care provider if you experience any of the following:  worsening rash     Activity as tolerated         Pending Diagnostic Studies:     None         Medications:  Reconciled Home Medications:      Medication List      CHANGE how you take these medications    blood sugar diagnostic Strp  1 each by Misc.(Non-Drug; Combo Route) route once daily.  What changed:  when to take this     lancets Misc  1 each by Misc.(Non-Drug; Combo Route) route once daily.  What changed:  when to take this     warfarin 5 MG tablet  Commonly known as:  COUMADIN  TAKE 1 TABLET BY MOUTH EVERY DAY EXCEPT TH 1AND 1/2 TABLETS ON MONDAY AND FRIDAY OR AS DIRECTED  What changed:  See the new instructions.        CONTINUE taking these medications     aspirin 81 MG EC tablet  Commonly known as:  ECOTRIN  Take 81 mg by mouth once daily.     atorvastatin 80 MG tablet  Commonly known as:  LIPITOR  TAKE 1 TABLET BY MOUTH EVERY EVENING     cinacalcet 30 MG Tab  Commonly known as:  SENSIPAR  Take 30 mg by mouth daily with breakfast.     clopidogrel 75 mg tablet  Commonly known as:  PLAVIX  TAKE 1 TABLET(75 MG) BY MOUTH EVERY DAY     fludrocortisone 0.1 mg Tab  Commonly known as:  FLORINEF  Take 1 tablet (100 mcg total) by mouth 2 (two) times daily.     gabapentin 600 MG tablet  Commonly known as:  NEURONTIN  Take 600 mg by mouth 2 (two) times daily.     levETIRAcetam 500 MG Tab  Commonly known as:  KEPPRA  TAKE 1 TABLET BY MOUTH TWICE DAILY     midodrine 10 MG tablet  Commonly known as:  PROAMATINE  Take 1 tablet (10 mg total) by mouth 3 (three) times daily.     nortriptyline 25 MG capsule  Commonly known as:  PAMELOR  Take 1 capsule by mouth nightly     omeprazole 20 MG capsule  Commonly known as:  PRILOSEC  TAKE 1 CAPSULE BY MOUTH EVERY DAY     patiromer calcium sorbitex 16.8 gram Pwpk  Commonly known as:  VELTASSA  Take 33.6 g by mouth once daily.     prednisoLONE acetate 1 % Drps  Commonly known as:  PRED FORTE  INSTILL ONE DROP INTO  LEFT EYE THREE TIMES A DAY     predniSONE 20 MG tablet  Commonly known as:  DELTASONE  Take 0.5 tablets (10 mg total) by mouth once daily.     pregabalin 100 MG capsule  Commonly known as:  LYRICA  Take 1 capsule (100 mg total) by mouth 3 (three) times daily.     DENISE-LINDA 0.8 mg Tab  Generic drug:  B complex-vitamin C-folic acid  TAKE 1 TABLET BY MOUTH ONCE DAILY     RENVELA 800 mg Tab  Generic drug:  sevelamer carbonate  TAKE 1 TABLET BY MOUTH THREE TIMES DAILY WITH MEALS     tiZANidine 4 MG tablet  Commonly known as:  ZANAFLEX  TAKE 1 TABLET BY MOUTH EVERY DAY AS NEEDED FOR MUSCLE SPASMS     TRADJENTA 5 mg Tab tablet  Generic drug:  linagliptin  TAKE 1 TABLET(5 MG) BY MOUTH EVERY DAY            Indwelling Lines/Drains at time of  discharge:   Lines/Drains/Airways     Drain                 Hemodialysis AV Graft Left upper arm -- days                Time spent on the discharge of patient: >35 minutes  Patient was seen and examined on the date of discharge and determined to be suitable for discharge.       Discussed with staff  Snow Vasquez PA-C  Department of Hospital Medicine  [facility]

## 2019-05-14 NOTE — ASSESSMENT & PLAN NOTE
Long term use of anticoagulants  Pacemaker in situ  -Successful JARAD/DCCV 6/27/2016  -warfarin (COUMADIN) tablet 5 mg, 5 mg, Oral, Daily  -Pt is currently rate controlled with HR <110              -In afib but V-paced  -[id number] score >2  -not on any rate control agents  -continue to monitor on telemetry  -continue to monitor INR daily: 2.8 today  -Maintain K > 4, Mg > 2

## 2019-05-14 NOTE — ASSESSMENT & PLAN NOTE
Acute shortness of breath  -Chronically on home O2 at 4L NC  -albuterol-ipratropium 2.5 mg-0.5 mg/3 mL 3 mL, Nebulization, Q6H and Q4 PRN  -I'm unsure what actually prompted the acute event in HD  -Regardless, it was transient and she has now returned to normal              -?Flash pulmonary edema with rapid BP shifts with HD              -?Acute CHF              -?Arrythmia or other cardiogenic issues              -?PTE              -?Infectious PNA              -?Acute bronchospastic event  -She denies any fever, chills, worsening cough (other than the brief episode)  -Have pacer interrogated in am> PPM captured no events during time of SOB  -Nephro repeat edHD for more fluid removal  -PTE unlikely given adequate anticoagulation and rapid self correction  -Repeat CXR relatively unchanged; pt afebrile and denies cough- low suspicion for pna  No further episodes of hypoxia or SOB, stable for dc with return precautions discussed; PCP fu; pt voiced understnading

## 2019-05-14 NOTE — ASSESSMENT & PLAN NOTE
· The estimated ejection fraction is 35% (3/11/19)  · Left ventricular diastolic dysfunction. (3/11/19)  -Followed in HTS clinic by Dr. Chavarria  -She used to be on adcirca but stopped due to symptomatic hypotension  -Fluid removal via HD  -On no ARB/ACE/BB due to recurrent hypotension

## 2019-05-14 NOTE — ASSESSMENT & PLAN NOTE
Consult Nephrology, though successfully had HD today  -cinacalcet tablet 30 mg, 30 mg, Oral, Daily with breakfast  -sevelamer carbonate tablet 800 mg, 800 mg, Oral, TID WM  -Home med held:              -patiromer calcium sorbitex (VELTASSA) 16.8 gram PwPk, Take 33.6 g by mouth once daily  K 5.5 this AM, started kayexalate while in house to substitute for Veltassa> K 3.8 @ dc

## 2019-05-15 NOTE — PROGRESS NOTES
Patient admitted 5/9/19 - 5/11/19 for SOB after HD and now has [facility] HH (called and spoke to [place] and [place] to verify patient is on their service).  Per discharge AVS Coumadin 5 mg tab to take 1-1/2 tabs Monday, Friday and 1 tab all other days which is different than her maintenance plan dose.  Patient was also admitted and discharged on 5/8/19 for fistulogram with no changes to meds.  Calendar update on doses given during both admits.     I called the patient and she want not aware of the AVS discharge instructions per her Coumadin and she states she has been taking Coumadin 5 mg tab - 1 daily.  When I called her the home health nurse (Karma) was there and she will draw INR via venipuncture today.  Order faxed to [facility] for INR on 5/15/19.

## 2019-05-17 NOTE — PROGRESS NOTES
Since pt has not take 5/16 dose of 7.5mg will instruct her to take it 5/17.  See dosing per calendar.

## 2019-05-23 NOTE — ASSESSMENT & PLAN NOTE
"Patient has been admitted for volume overload during her last several hospitalizations. Her PFTs show severe restriction.   Keep patient on Prednisone 20mg PO Qday.   Patient states she "wants new lungs". I explained to the patient that she has heart and lung issues. (CHF and Sarcoidosis). Her more pressing problem is to optimize her CHF.     "

## 2019-05-23 NOTE — PROGRESS NOTES
"Subjective:       Patient ID: Sisi Medel is a 58 y.o. female.    Chief Complaint: Sarcoidosis    58 year old female with ESRD, HFrEF, and pulmonary sarcoidosis who presents for follow up evaluation. Patient is currently on 4 L NC. She states that when her Prednisone is decreased to 10 mg she begins to have symptoms of dyspnea. She was increased to 20mg in June and her symptoms improved.   Denies fever or chills.   Denies lower extremity edema and chest pain.        Interval hx: Patient has been hospitalized multiple times for SOB and acute on chronic CHF since her last visit. Patient currently on Prednisone 20mg PO Qday.   Denies fever or chills.   Lower extremity edema. Denies chest pain.   Patient is on 4L NC at rest.     Review of Systems   Constitutional: Negative for activity change and appetite change.   Respiratory: Positive for shortness of breath, dyspnea on extertion and Paroxysmal Nocturnal Dyspnea.    Cardiovascular: Positive for leg swelling. Negative for chest pain.       Objective:       Vitals:    05/15/19 1345   BP: 90/70   Pulse: 70   SpO2: (!) 89%   Weight: 117 kg (258 lb)   Height: 5' 9" (1.753 m)       Physical Exam   Constitutional: She is oriented to person, place, and time. She appears well-developed and well-nourished. No distress.   Cardiovascular: Normal rate and regular rhythm.   Pulmonary/Chest: Normal expansion and effort normal. She has no rales.   Musculoskeletal: Normal range of motion. She exhibits no edema.   Neurological: She is alert and oriented to person, place, and time.   Skin: She is not diaphoretic.   Nursing note and vitals reviewed.    Personal Diagnostic Review  FEV1 1.40 52% TLC 1.95 35%.   No flowsheet data found.      Assessment:       No diagnosis found.    Outpatient Encounter Medications as of 5/15/2019   Medication Sig Dispense Refill    aspirin (ECOTRIN) 81 MG EC tablet Take 81 mg by mouth once daily.      atorvastatin (LIPITOR) 80 MG tablet TAKE 1 " TABLET BY MOUTH EVERY EVENING 90 tablet 1    blood sugar diagnostic Strp 1 each by Misc.(Non-Drug; Combo Route) route once daily. (Patient taking differently: 1 each by Misc.(Non-Drug; Combo Route) route 2 (two) times daily. ) 100 each 3    cinacalcet (SENSIPAR) 30 MG Tab Take 30 mg by mouth daily with breakfast.      clopidogrel (PLAVIX) 75 mg tablet TAKE 1 TABLET(75 MG) BY MOUTH EVERY DAY 90 tablet 0    fludrocortisone (FLORINEF) 0.1 mg Tab Take 1 tablet (100 mcg total) by mouth 2 (two) times daily. 60 tablet 5    gabapentin (NEURONTIN) 600 MG tablet Take 600 mg by mouth 2 (two) times daily.       lancets Misc 1 each by Misc.(Non-Drug; Combo Route) route once daily. (Patient taking differently: 1 each by Misc.(Non-Drug; Combo Route) route 2 (two) times daily. ) 100 each 3    levETIRAcetam (KEPPRA) 500 MG Tab TAKE 1 TABLET BY MOUTH TWICE DAILY 180 tablet 3    midodrine (PROAMATINE) 10 MG tablet Take 1 tablet (10 mg total) by mouth 3 (three) times daily. 90 tablet 11    nortriptyline (PAMELOR) 25 MG capsule Take 1 capsule by mouth nightly  1    omeprazole (PRILOSEC) 20 MG capsule TAKE 1 CAPSULE BY MOUTH EVERY DAY 90 capsule 4    patiromer calcium sorbitex (VELTASSA) 16.8 gram PwPk Take 33.6 g by mouth once daily.       prednisoLONE acetate (PRED FORTE) 1 % DrpS INSTILL ONE DROP INTO  LEFT EYE THREE TIMES A DAY  3    predniSONE (DELTASONE) 20 MG tablet Take 0.5 tablets (10 mg total) by mouth once daily. 30 tablet 2    pregabalin (LYRICA) 100 MG capsule Take 1 capsule (100 mg total) by mouth 3 (three) times daily. 270 capsule 1    PROLENSA 0.07 % Drop INSTILL 1 DROP IN LEFT / RIGHT EYE QD  12    DENISE-LINDA 0.8 mg Tab TAKE 1 TABLET BY MOUTH ONCE DAILY  0    RENVELA 800 mg Tab TAKE 1 TABLET BY MOUTH THREE TIMES DAILY WITH MEALS 90 tablet 0    sodium polystyrene (KAYEXALATE) powder TK 30 GRAMS PO D  5    tiZANidine (ZANAFLEX) 4 MG tablet TAKE 1 TABLET BY MOUTH EVERY DAY AS NEEDED FOR MUSCLE SPASMS 90  "tablet 0    TRADJENTA 5 mg Tab tablet TAKE 1 TABLET(5 MG) BY MOUTH EVERY DAY 90 tablet 1    warfarin (COUMADIN) 5 MG tablet TAKE 1 TABLET BY MOUTH EVERY DAY EXCEPT TH 1AND 1/2 TABLETS ON MONDAY AND FRIDAY OR AS DIRECTED (Patient taking differently: TAKE 1 TABLET BY MOUTH EVERY DAY OR AS DIRECTED) 120 tablet 0     No facility-administered encounter medications on file as of 5/15/2019.      No orders of the defined types were placed in this encounter.      Plan:       Pulmonary sarcoidosis  Patient has been admitted for volume overload during her last several hospitalizations. Her PFTs show severe restriction.   Keep patient on Prednisone 20mg PO Qday.   Patient states she "wants new lungs". I explained to the patient that she has heart and lung issues. (CHF and Sarcoidosis). Her more pressing problem is to optimize her CHF.       Follow up in 6 months.     Jj Altamirano MD  "

## 2019-05-24 NOTE — TELEPHONE ENCOUNTER
----- Message from Aníbal Soares sent at 5/24/2019  2:13 PM CDT -----  Contact: Pt  Needs Advice    Reason for call:The Pt states that she has an appt at 3:00 on 5/31/19 and it's to much of a rush for her to go to your appt at 2:20 and she would like to have more time in between appts.        Communication Preference:[phone]    Additional Information:

## 2019-05-27 NOTE — PROGRESS NOTES
Brigitte/[facility] called on 05/24/19 to cat apt 05/28/19 to 05/29/19. Patient was contact this am 05/27/19 she will be coming to (Coumadin clinic) on 05/29/19.

## 2019-05-28 NOTE — TELEPHONE ENCOUNTER
Patient is currently admitted to hospital.   I have not sent a verbal order to home health. Her most recent home health orders appear to have come from her inpatient team from her hospitalization just previous to this one; discharge summary for that admission was signed by Snow SANTA. Her admitting provider was Dr Saul Thompson.  Please clarify what order they are referring to?

## 2019-05-28 NOTE — TELEPHONE ENCOUNTER
----- Message from Hafsa Trevizo sent at 5/28/2019  3:31 PM CDT -----  Contact: Brigitte/[facility]/[phone] ext.[phone]  [facility] is requesting a call from the office regarding a verbal order for home health services.    Please advise, thank you

## 2019-05-29 NOTE — PROGRESS NOTES
Patient admitted 5/27/19 -5/29/19 for CHF, started on benzonatate (TESSALON), Calendar up to date on dose given during admit, I spoke to the patient and she states she will take her Coumadin per our last instructions of 1-1/2 tabs on Wednesday, Saturday and 1 tab all other days.  CATHRYN Muniz, Pharm D assisted with plan of care.  I faxed order to  for INR on 6/4/19.

## 2019-05-29 NOTE — PROGRESS NOTES
Brigitte ([facility]) called 05/29/19 to inform us that patient was discharged  05/28/19. On 05/28/19 ( Inr 2.2), patient went home on (Warfarin 5mg daily, except Thurs/Fri/Mon 7.5/or as directed by coumadin clinic.Patient also is taking (Tessalon Pearls 100mg 1 tablet daily x3 for 10 days. HH will be going on 05/31/19. Please advise

## 2019-05-29 NOTE — PROGRESS NOTES
Subjective:      Patient ID: Sisi Medel is a 58 y.o. female.    Chief Complaint: Diabetic Foot Exam (03/01/2019 dr jill eduardo) and Diabetes Mellitus    Pt here today for multiple wounds on lower extremities. Denies any NVFCSOB chest or calf pain. Pt has a nurse changing dressings 3 times a week.     5/13/2019 Pt presents today for wound care. Pt states she has been in the hospital recently, for breathing issues. Pt denies any NVFCSOB.     Past Medical History:   Diagnosis Date    Anemia in ESRD (end-stage renal disease) 3/7/2016    Anticoagulant long-term use     Cervical radiculopathy 7/20/2015    CHF (congestive heart failure)     Chronic combined systolic and diastolic heart failure 6/14/2013    EF 20% 2013, improved with Medical therapy, negative PET 2013    Chronic respiratory failure with hypoxia 04/22/2013    On home oxygen at 2-5 liters    Closed fracture of proximal end of right fibula 6/27/2016    Complications due to renal dialysis device, implant, and graft     DDD (degenerative disc disease), lumbar 6/17/2015    Dependence on renal dialysis     Diabetes mellitus type II, controlled 12/8/2017    ESRD on hemodialysis 2/23/2016    Essential hypertension     Gout     Lateral meniscal tear 5/31/2016    Lumbar stenosis 6/17/2015    Obesity, Class III, BMI 40-49.9 (morbid obesity)     Pacemaker     Paroxysmal atrial fibrillation 5/16/2014    Not on anticoagulation    Peripheral neuropathy 11/13/2013    Personal history of gastric ulcer 3/19/2013    Pneumonia of left lower lobe due to infectious organism 3/10/2019    Sarcoidosis, lung 2009    Diagnosed in 2009 with ocular manifestation, on 4L home O2 and PO steroids    Secondary pulmonary hypertension 4/7/2015    Seizure disorder 3/19/2013    Shingles 11/13/2013    Thyroid disease            Current Outpatient Medications on File Prior to Visit   Medication Sig Dispense Refill    aspirin (ECOTRIN) 81 MG EC tablet Take  81 mg by mouth once daily.      atorvastatin (LIPITOR) 80 MG tablet TAKE 1 TABLET BY MOUTH EVERY EVENING 90 tablet 1    benzonatate (TESSALON) 100 MG capsule Take 1 capsule (100 mg total) by mouth 3 (three) times daily. for 10 days 30 capsule 0    blood sugar diagnostic Strp 1 each by Misc.(Non-Drug; Combo Route) route once daily. (Patient taking differently: 1 each by Misc.(Non-Drug; Combo Route) route 2 (two) times daily. ) 100 each 3    cinacalcet (SENSIPAR) 30 MG Tab Take 30 mg by mouth daily with breakfast.      clopidogrel (PLAVIX) 75 mg tablet TAKE 1 TABLET(75 MG) BY MOUTH EVERY DAY 90 tablet 0    fludrocortisone (FLORINEF) 0.1 mg Tab Take 1 tablet (100 mcg total) by mouth 2 (two) times daily. 60 tablet 5    gabapentin (NEURONTIN) 600 MG tablet Take 600 mg by mouth 2 (two) times daily.       lancets Misc 1 each by Misc.(Non-Drug; Combo Route) route once daily. (Patient taking differently: 1 each by Misc.(Non-Drug; Combo Route) route 2 (two) times daily. ) 100 each 3    levETIRAcetam (KEPPRA) 500 MG Tab TAKE 1 TABLET BY MOUTH TWICE DAILY 180 tablet 3    midodrine (PROAMATINE) 10 MG tablet Take 1 tablet (10 mg total) by mouth 3 (three) times daily. 90 tablet 11    nortriptyline (PAMELOR) 25 MG capsule Take 1 capsule by mouth nightly  1    omeprazole (PRILOSEC) 20 MG capsule TAKE 1 CAPSULE BY MOUTH EVERY DAY 90 capsule 4    patiromer calcium sorbitex (VELTASSA) 16.8 gram PwPk Take 33.6 g by mouth once daily.       prednisoLONE acetate (PRED FORTE) 1 % DrpS INSTILL ONE DROP INTO  LEFT EYE THREE TIMES A DAY  3    predniSONE (DELTASONE) 20 MG tablet Take 0.5 tablets (10 mg total) by mouth once daily. 30 tablet 2    pregabalin (LYRICA) 100 MG capsule Take 1 capsule (100 mg total) by mouth 3 (three) times daily. 270 capsule 1    PROLENSA 0.07 % Drop INSTILL 1 DROP IN LEFT / RIGHT EYE QD  12    DENISE-LINDA 0.8 mg Tab TAKE 1 TABLET BY MOUTH ONCE DAILY  0    RENVELA 800 mg Tab TAKE 1 TABLET BY MOUTH  THREE TIMES DAILY WITH MEALS 90 tablet 0    sodium polystyrene (KAYEXALATE) powder TK 30 GRAMS PO D  5    tiZANidine (ZANAFLEX) 4 MG tablet TAKE 1 TABLET BY MOUTH EVERY DAY AS NEEDED FOR MUSCLE SPASMS 90 tablet 0    TRADJENTA 5 mg Tab tablet TAKE 1 TABLET(5 MG) BY MOUTH EVERY DAY 90 tablet 1    warfarin (COUMADIN) 5 MG tablet TAKE 1 TABLET BY MOUTH EVERY DAY EXCEPT TH 1AND 1/2 TABLETS ON MONDAY AND FRIDAY OR AS DIRECTED (Patient taking differently: TAKE 1 TABLET BY MOUTH EVERY DAY OR AS DIRECTED) 120 tablet 0     Current Facility-Administered Medications on File Prior to Visit   Medication Dose Route Frequency Provider Last Rate Last Dose    [COMPLETED] 0.9%  NaCl infusion   Intravenous Once Dinh Rodriguez  mL/hr at 05/28/19 1422 400 mL at 05/28/19 1422    [COMPLETED] midodrine tablet 5 mg  5 mg Oral Once Gerardo Izaguirre PA-C   5 mg at 05/29/19 0115    [DISCONTINUED] acetaminophen tablet 500 mg  500 mg Oral Q6H PRN Annette Vail NP        [DISCONTINUED] albuterol-ipratropium 2.5 mg-0.5 mg/3 mL nebulizer solution 3 mL  3 mL Nebulization Q6H Annette Vail NP   3 mL at 05/29/19 0845    [DISCONTINUED] albuterol-ipratropium 2.5 mg-0.5 mg/3 mL nebulizer solution 3 mL  3 mL Nebulization Q4H PRN Annette Vail NP        [DISCONTINUED] aspirin EC tablet 81 mg  81 mg Oral Daily Annette Vail NP   81 mg at 05/29/19 0916    [DISCONTINUED] atorvastatin tablet 80 mg  80 mg Oral QHS Annette Vail NP   80 mg at 05/28/19 2157    [DISCONTINUED] benzonatate capsule 100 mg  100 mg Oral TID Azul Aragon PA-C   100 mg at 05/29/19 0916    [DISCONTINUED] cinacalcet tablet 30 mg  30 mg Oral Daily with breakfast Annette Vail NP   30 mg at 05/29/19 0919    [DISCONTINUED] clopidogrel tablet 75 mg  75 mg Oral Daily Annette Vail NP   75 mg at 05/29/19 0917    [DISCONTINUED] dextrose 50% injection 12.5 g  12.5 g Intravenous PRN Annette Vail NP        [DISCONTINUED] dextrose 50%  injection 25 g  25 g Intravenous PRN Annette Vail NP        [DISCONTINUED] fludrocortisone tablet 100 mcg  100 mcg Oral BID Annette Vail NP   100 mcg at 05/29/19 0913    [DISCONTINUED] gabapentin capsule 600 mg  600 mg Oral BID Annette Vail NP   600 mg at 05/29/19 0915    [DISCONTINUED] glucagon (human recombinant) injection 1 mg  1 mg Intramuscular PRN Annette Vail NP        [DISCONTINUED] glucose chewable tablet 16 g  16 g Oral PRN Annette Vail NP        [DISCONTINUED] glucose chewable tablet 24 g  24 g Oral PRN Annette Vail NP        [DISCONTINUED] insulin aspart U-100 pen 0-5 Units  0-5 Units Subcutaneous QID (AC + HS) PRN Annette Vail NP   1 Units at 05/28/19 2246    [DISCONTINUED] levETIRAcetam tablet 500 mg  500 mg Oral BID Annette Vail NP   500 mg at 05/29/19 0916    [DISCONTINUED] midodrine tablet 10 mg  10 mg Oral TID Annette Vail NP   10 mg at 05/29/19 0914    [DISCONTINUED] nortriptyline capsule 25 mg  25 mg Oral Nightly Annette Vail NP   25 mg at 05/28/19 2158    [DISCONTINUED] ondansetron disintegrating tablet 8 mg  8 mg Oral Q8H PRN Annette Vail NP        [DISCONTINUED] ondansetron injection 4 mg  4 mg Intravenous Q8H PRN Annette Vail NP        [DISCONTINUED] oxyCODONE immediate release tablet 5 mg  5 mg Oral Q6H PRN Annette Vail NP        [DISCONTINUED] pantoprazole EC tablet 40 mg  40 mg Oral Daily Annette Vail NP   40 mg at 05/29/19 0917    [DISCONTINUED] prednisoLONE acetate 1 % ophthalmic suspension 1 drop  1 drop Left Eye TID Annette Vail NP   1 drop at 05/29/19 0913    [DISCONTINUED] predniSONE tablet 20 mg  20 mg Oral Daily Annette Vail NP   20 mg at 05/29/19 0913    [DISCONTINUED] pregabalin capsule 100 mg  100 mg Oral TID Annette Vail NP   100 mg at 05/29/19 0913    [DISCONTINUED] promethazine tablet 25 mg  25 mg Oral Q6H PRN Annette Vail NP        [DISCONTINUED] ramelteon tablet 8 mg   8 mg Oral Nightly PRN Annette Vail NP        [DISCONTINUED] senna-docusate 8.6-50 mg per tablet 1 tablet  1 tablet Oral BID Annette Vail NP   1 tablet at 19    [DISCONTINUED] sevelamer carbonate tablet 800 mg  800 mg Oral TID WM Annette Vail NP   800 mg at 1914    [DISCONTINUED] sodium chloride 0.9% flush 10 mL  10 mL Intravenous PRN Annette Vail NP        [DISCONTINUED] sodium chloride 0.9% flush 3 mL  3 mL Intravenous Q8H Annette Vail NP   3 mL at 19    [DISCONTINUED] sodium polystyrene powder 30 g  30 g Oral Daily Annette Vail NP   30 g at 19    [DISCONTINUED] tiZANidine tablet 4 mg  4 mg Oral Daily PRN Annette Vail NP        [DISCONTINUED] vitamin renal formula (B-complex-vitamin c-folic acid) 1 mg per capsule 1 capsule  1 capsule Oral Daily Annette Vail NP   1 capsule at 19    [DISCONTINUED] warfarin (COUMADIN) tablet 5 mg  5 mg Oral Daily Annette Vail NP   5 mg at 19           Review of patient's allergies indicates:   Allergen Reactions    Bactrim [sulfamethoxazole-trimethoprim] Other (See Comments)     Causes renal failure    Nsaids (non-steroidal anti-inflammatory drug) Other (See Comments)     Renal failure           Social History     Socioeconomic History    Marital status: Single     Spouse name: Not on file    Number of children: Not on file    Years of education: Not on file    Highest education level: Not on file   Occupational History    Not on file   Social Needs    Financial resource strain: Not on file    Food insecurity:     Worry: Not on file     Inability: Not on file    Transportation needs:     Medical: Not on file     Non-medical: Not on file   Tobacco Use    Smoking status: Former Smoker     Packs/day: 0.50     Years: 10.00     Pack years: 5.00     Types: Cigarettes     Last attempt to quit: 1994     Years since quittin.1    Smokeless tobacco: Never  "Used   Substance and Sexual Activity    Alcohol use: No     Alcohol/week: 0.0 oz     Comment: rarely    Drug use: No    Sexual activity: Not Currently   Lifestyle    Physical activity:     Days per week: Not on file     Minutes per session: Not on file    Stress: Not on file   Relationships    Social connections:     Talks on phone: Not on file     Gets together: Not on file     Attends Jew service: Not on file     Active member of club or organization: Not on file     Attends meetings of clubs or organizations: Not on file     Relationship status: Not on file   Other Topics Concern    Are you pregnant or think you may be? No    Breast-feeding No   Social History Narrative    Lives with boyfriend/partner who helps with her care.            Review of Systems   Constitution: Negative for chills, diaphoresis, fever, malaise/fatigue and night sweats.   Cardiovascular: Positive for leg swelling. Negative for claudication and cyanosis.   Skin: Positive for color change, dry skin, nail changes, poor wound healing and unusual hair distribution.   Musculoskeletal:        Abnormal gait, uses rolling walker.    Neurological: Positive for numbness and sensory change. Negative for paresthesias, tremors and weakness.           Objective:        Vitals:    05/29/19 1059   BP: 116/79   Pulse: 70   Weight: 113.4 kg (250 lb)   Height: 5' 8.4" (1.737 m)           Physical Exam   Cardiovascular:   Pulses:       Dorsalis pedis pulses are 0 on the right side, and 0 on the left side.        Posterior tibial pulses are 1+ on the right side, and 1+ on the left side.   Diffuse non pitting edema b/L     Musculoskeletal:    Amputations noted to right hallux and right 2nd digit   Feet:   Right Foot:   Protective Sensation: 5 sites tested. 0 sites sensed.   Left Foot:   Protective Sensation: 5 sites tested. 0 sites sensed.   Lymphadenopathy:   Negative lymphadenopathy bilateral popliteal fossa and tarsal tunnel.  Negative " lymphangitic streaking bilateral foot/ankle bilateral.     Neurological:   Absent/loss of protective sensation all toes bilateral to 10 gram monofilament.     Skin: Lesion noted.   Nails x8 are short and dystrophic    Ulceration  Location: right lateral leg  Measurements: 1.0x 1.0x 0.2cm  Drainage: serous  Wound base: fibrogranular  SOI: none    Ulceration  Location: left medial hallux  Measurements: 0.5x 0.5x 0.1cm  Drainage: serous  Wound base: fibrogranlar  SOI: none    Ulceration  Location: right plantar hallux  Measurements: 0.5x 0.5x 0.1  Drainage: serous  Wound base: granular  SOI: none                     Assessment:       Encounter Diagnoses   Name Primary?    Skin ulcer of toe of left foot, limited to breakdown of skin Yes    Ulcer of right lower extremity with fat layer exposed     Skin ulcer of toe of right foot, limited to breakdown of skin          Plan:       Sisi was seen today for diabetic foot exam and diabetes mellitus.    Diagnoses and all orders for this visit:    Skin ulcer of toe of left foot, limited to breakdown of skin    Ulcer of right lower extremity with fat layer exposed    Skin ulcer of toe of right foot, limited to breakdown of skin      I counseled the patient on her conditions, their implications and medical management.    Ulceration on right foot and leg debrided through sub-q tissue using an tissue nipper. Ulceration debrided down to healthy tissue. Pt tolerated debridement well.    orders updated  Football dressing applied to pts b/L foot and wrapping to right leg,  by Sadie Chauhan MA under my direct supervision. Pt tolerated dressing well.   RTC in 2 weeks or sooner if any new pedal problems arise, any signs of infection occur, or if condition worsens.

## 2019-05-29 NOTE — TELEPHONE ENCOUNTER
[facility]ig[facility] with [facility] stated pt was in OBS and has been released. Her request is for a re-cert order to cover 06/01/19-07/30/19 to cover Nurse visits 3 x's /wk for wound care. She says...In hosp staff wont give her that.

## 2019-05-30 PROBLEM — R74.01 TRANSAMINITIS: Status: ACTIVE | Noted: 2019-01-01

## 2019-05-30 PROBLEM — G93.40 ENCEPHALOPATHY: Status: ACTIVE | Noted: 2019-01-01

## 2019-05-30 PROBLEM — J96.22 ACUTE ON CHRONIC RESPIRATORY FAILURE WITH HYPOXIA AND HYPERCAPNIA: Status: ACTIVE | Noted: 2019-01-01

## 2019-05-30 PROBLEM — J96.21 ACUTE AND CHRONIC RESPIRATORY FAILURE WITH HYPOXIA: Status: ACTIVE | Noted: 2019-01-01

## 2019-05-30 PROBLEM — S81.801A WOUND OF RIGHT LEG: Status: ACTIVE | Noted: 2019-01-01

## 2019-05-30 NOTE — ASSESSMENT & PLAN NOTE
Outpatient HD unit: [facility]   -Nephrologist: Patient does not know  -HD tx days: TThS  -HD tx time: 4hrs 45mins    -HD access: LUE AVF  -HD modality: iHD  -Residual urine: Minimal  -EDW: 111.5 kg    Plan/Recommendations:  -STAT ultrafiltration to be done in the ED  -UF 3L as tolerated  -plan for additional UF/HD session tomorrow dependent on AM labs  -will continue challenging her EDW for volume optimization.

## 2019-05-30 NOTE — SUBJECTIVE & OBJECTIVE
Past Medical History:   Diagnosis Date    Anemia in ESRD (end-stage renal disease) 3/7/2016    Anticoagulant long-term use     Cervical radiculopathy 2015    CHF (congestive heart failure)     Chronic combined systolic and diastolic heart failure 2013    EF 20% , improved with Medical therapy, negative PET     Chronic respiratory failure with hypoxia 2013    On home oxygen at 2-5 liters    Closed fracture of proximal end of right fibula 2016    Complications due to renal dialysis device, implant, and graft     DDD (degenerative disc disease), lumbar 2015    Dependence on renal dialysis     Diabetes mellitus type II, controlled 2017    ESRD on hemodialysis 2016    Essential hypertension     Gout     Lateral meniscal tear 2016    Lumbar stenosis 2015    Obesity, Class III, BMI 40-49.9 (morbid obesity)     Pacemaker     Paroxysmal atrial fibrillation 2014    Not on anticoagulation    Peripheral neuropathy 2013    Personal history of gastric ulcer 3/19/2013    Pneumonia of left lower lobe due to infectious organism 3/10/2019    Sarcoidosis, lung 2009    Diagnosed in  with ocular manifestation, on 4L home O2 and PO steroids    Secondary pulmonary hypertension 2015    Seizure disorder 3/19/2013    Shingles 2013    Thyroid disease        Past Surgical History:   Procedure Laterality Date    ABDOMINAL SURGERY      ABLATION N/A 2017    Performed by Bl[facility]imir [facility]orno [facility] at [facility] CATH LAB    ANGIOPLASTY Left 1/10/2018    Performed by Torrey Villafana MD at [facility] OR 2ND FLR    ANGIOPLASTY-PERCUTANEOUS TRANSLUMINAL (PTA) Left 2017    Performed by [facility] at [facility] OR 2ND FLR    ANGIOPLASTY-PERCUTANEOUS TRANSLUMINAL (PTA) Left 10/12/2016    Performed by [facility] at [facility] OR 2ND FLR    CARDIAC PACEMAKER PLACEMENT       SECTION      x2    DECLOT GRAFT PERCUTANEOUS Left  2/7/2017    Performed by [facility] at [facility] OR 2ND FLR    DECLOT-GRAFT Left 6/21/2016    Performed by Harjit Starr MD at [facility] CATH LAB    DECLOT-GRAFT Left 6/20/2016    Performed by [facility]jit Starr [facility] at [facility] CATH LAB    ECHOCARDIOGRAM-TRANSESOPHAGEAL N/A 6/27/2013    Performed by Delmy Surgeon at [facility] DELMY    Fistulogram Left 4/8/2019    Performed by Tor[facility]ana MD at [facility] CATH LAB    fistulogram Left 1/10/2018    Performed by Torrey [facility]afana MD at [facility] OR 2ND FLR    FISTULOGRAM Left 9/13/2017    Performed by Torrey [facility]afana MD at [facility] CATH LAB    FISTULOGRAM Left 10/12/2016    Performed by Torrey [facility]afana MD at [facility] OR 2ND FLR    FISTULOGRAM Left 6/21/2016    Performed by Harjit Starr MD at [facility] CATH LAB    Fistulogram, LUE AVG, possible intervention Left 1/30/2019    Performed by Tor[facility]ana MD at [facility] CATH LAB    Fistulogram, OR 11 Left 5/8/2019    Performed by Torrey Villafana MD at [facility] OR 2ND FLR    INJECTION-STEROID-EPIDURAL-TRANSFORAMINAL Right 7/20/2015    Performed by Pat[facility]rez MD at [facility] PAIN MGT    INJECTION-STEROID-EPIDURAL-TRANSFORAMINAL Right 5/21/2015    Performed by Patria [facility]ierrez MD at [facility] PAIN MGT    [id number] / Left upper AV graft. Left 2/3/2016    Performed by Tor[facility]ana [facility] at [facility] OR 2ND FLR    BSIVNYMUO-OHXOPULQL-[id number] N/A 1/11/2017    Performed by Bladimir Adorno MD at [facility] CATH LAB    OTHER SURGICAL HISTORY      loop recorder implant    PLACEMENT-STENT Left 2/7/2017    Performed by Torrey [facility]afana MD at [facility] OR 2ND FLR    PTA, AV FISTULA N/A 1/30/2019    Performed by Tor[facility]afana MD at [facility] CATH LAB    stent to fistula      TRANSESOPHAGEAL ECHOCARDIOGRAM (JARAD) N/A 6/27/2016    Performed by Sourav Machuca MD at [facility] CATH LAB    ULTRASOUND, UPPER GI TRACT, ENDOSCOPIC N/A 1/9/2014    Performed by Stewart Burgess MD at [facility] ENDO (2ND FLR)    VASCULAR SURGERY       fistula to L upper arm        Review of patient's allergies indicates:   Allergen Reactions    Bactrim [sulfamethoxazole-trimethoprim] Other (See Comments)     Causes renal failure    Nsaids (non-steroidal anti-inflammatory drug) Other (See Comments)     Renal failure     No current facility-administered medications for this encounter.      Current Outpatient Medications   Medication    aspirin (ECOTRIN) 81 MG EC tablet    atorvastatin (LIPITOR) 80 MG tablet    benzonatate (TESSALON) 100 MG capsule    blood sugar diagnostic Strp    cinacalcet (SENSIPAR) 30 MG Tab    clopidogrel (PLAVIX) 75 mg tablet    fludrocortisone (FLORINEF) 0.1 mg Tab    gabapentin (NEURONTIN) 600 MG tablet    lancets Misc    levETIRAcetam (KEPPRA) 500 MG Tab    midodrine (PROAMATINE) 10 MG tablet    nortriptyline (PAMELOR) 25 MG capsule    omeprazole (PRILOSEC) 20 MG capsule    patiromer calcium sorbitex (VELTASSA) 16.8 gram PwPk    prednisoLONE acetate (PRED FORTE) 1 % DrpS    predniSONE (DELTASONE) 20 MG tablet    pregabalin (LYRICA) 100 MG capsule    PROLENSA 0.07 % Drop    DENISE-LINDA 0.8 mg Tab    RENVELA 800 mg Tab    sodium polystyrene (KAYEXALATE) powder    tiZANidine (ZANAFLEX) 4 MG tablet    TRADJENTA 5 mg Tab tablet    warfarin (COUMADIN) 5 MG tablet     Family History     Problem Relation (Age of Onset)    Coronary artery disease Father    Diabetes Mother, Father, Maternal Grandmother    Hypertension Mother, Father    Kidney failure Mother    Lupus Sister        Tobacco Use    Smoking status: Former Smoker     Packs/day: 0.50     Years: 10.00     Pack years: 5.00     Types: Cigarettes     Last attempt to quit: 1994     Years since quittin.1    Smokeless tobacco: Never Used   Substance and Sexual Activity    Alcohol use: No     Alcohol/week: 0.0 oz     Comment: rarely    Drug use: No    Sexual activity: Not Currently     Review of Systems   Constitutional: Positive for fatigue. Negative  for chills and fever.   Respiratory: Positive for cough and shortness of breath. Negative for chest tightness and wheezing.    Cardiovascular: Negative for chest pain, palpitations and leg swelling.   Gastrointestinal: Negative for abdominal pain, constipation, diarrhea, nausea and vomiting.   Genitourinary: Negative for dysuria, flank pain and hematuria.   Musculoskeletal: Negative for arthralgias, back pain, neck pain and neck stiffness.   Skin: Negative for color change and rash.   Neurological: Positive for weakness. Negative for dizziness, light-headedness and headaches.   Psychiatric/Behavioral: Negative for agitation, behavioral problems, confusion and decreased concentration.     Objective:     Vital Signs (Most Recent):  Temp: 98 °F (36.7 °C) (05/30/19 1243)  Pulse: (!) 59 (05/30/19 1243)  Resp: (!) 30 (05/30/19 1243)  BP: 108/71 (05/30/19 1243)  SpO2: 95 % (05/30/19 1243)  O2 Device (Oxygen Therapy): BiPAP (05/30/19 1308) Vital Signs (24h Range):  Temp:  [98 °F (36.7 °C)] 98 °F (36.7 °C)  Pulse:  [59] 59  Resp:  [30] 30  SpO2:  [95 %] 95 %  BP: (108)/(71) 108/71     Weight: 112 kg (246 lb 14.6 oz) (05/30/19 1243)  Body mass index is 37.11 kg/m².  Body surface area is 2.32 meters squared.    No intake/output data recorded.    Physical Exam   Constitutional: She is oriented to person, place, and time. She appears well-developed. She is cooperative. She appears ill. She appears distressed.   BiPAP   HENT:   Head: Atraumatic.   Right Ear: External ear normal.   Left Ear: External ear normal.   Susan-orbital edema   Eyes: Conjunctivae and EOM are normal. Right eye exhibits no discharge. Left eye exhibits no discharge.   Neck: Normal range of motion. Neck supple.   Cardiovascular: Regular rhythm. Bradycardia present. Exam reveals no gallop and no friction rub.   Murmur heard.  Pulmonary/Chest: Accessory muscle usage present. Tachypnea noted. She is in respiratory distress. She has decreased breath sounds. She has  rales.   Abdominal: Soft. Bowel sounds are normal. She exhibits no distension. There is no tenderness.   Musculoskeletal: She exhibits no edema or deformity.   Neurological: She is alert and oriented to person, place, and time.   Skin: Skin is warm and dry. She is not diaphoretic.   RAISSA AVG       Significant Labs:  CBC:   Recent Labs   Lab 05/30/19  1313   WBC 8.63   RBC 5.00   HGB 12.9   HCT 42.4   *   MCV 85   MCH 25.8*   MCHC 30.4*     CMP:   Recent Labs   Lab 05/30/19  1313   GLU 90   CALCIUM 8.4*   ALBUMIN 2.9*   PROT 8.0   *   K 3.8   CO2 30*   CL 87*   BUN 10   CREATININE 2.9*   ALKPHOS 109   *   AST 94*   BILITOT 1.6*

## 2019-05-30 NOTE — ED PROVIDER NOTES
Encounter Date: 5/30/2019    SCRIBE #1 NOTE: I, Son Kelin, am scribing for, and in the presence of,  Dr. Potts . I have scribed the entire note.       History     Chief Complaint   Patient presents with    Shortness of Breath     SOB post dialysis today, initial sat 75% with labored respiration, EMS placed patient on CPAP sat now 95%     Time patient was seen by the provider: 12:54 PM    The patient is a 58 y.o. female presents to the ED via EMS for continued and worsening shortness of breath while at dialysis this morning. According to EMS, they were called by dialysis's staff because the patient became severely short of breath as she was walking to her car. Currently, she reports of an associated cough and fatigue. She was seen in the ED on 5/27/2019 for similar symptoms. Patient has a past medical history ESRD HD (TTRS), CHF, T2DM.     The history is provided by the patient, medical records and the EMS personnel.     Review of patient's allergies indicates:   Allergen Reactions    Bactrim [sulfamethoxazole-trimethoprim] Other (See Comments)     Causes renal failure    Nsaids (non-steroidal anti-inflammatory drug) Other (See Comments)     Renal failure     Past Medical History:   Diagnosis Date    Anemia in ESRD (end-stage renal disease) 3/7/2016    Anticoagulant long-term use     Cervical radiculopathy 7/20/2015    CHF (congestive heart failure)     Chronic combined systolic and diastolic heart failure 6/14/2013    EF 20% 2013, improved with Medical therapy, negative PET 2013    Chronic respiratory failure with hypoxia 04/22/2013    On home oxygen at 2-5 liters    Closed fracture of proximal end of right fibula 6/27/2016    Complications due to renal dialysis device, implant, and graft     DDD (degenerative disc disease), lumbar 6/17/2015    Dependence on renal dialysis     Diabetes mellitus type II, controlled 12/8/2017    ESRD on hemodialysis 2/23/2016    Essential hypertension     Gout      Lateral meniscal tear 2016    Lumbar stenosis 2015    Obesity, Class III, BMI 40-49.9 (morbid obesity)     Pacemaker     Paroxysmal atrial fibrillation 2014    Not on anticoagulation    Peripheral neuropathy 2013    Personal history of gastric ulcer 3/19/2013    Pneumonia of left lower lobe due to infectious organism 3/10/2019    Sarcoidosis, lung 2009    Diagnosed in  with ocular manifestation, on 4L home O2 and PO steroids    Secondary pulmonary hypertension 2015    Seizure disorder 3/19/2013    Shingles 2013    Thyroid disease      Past Surgical History:   Procedure Laterality Date    ABDOMINAL SURGERY      ABLATION N/A 2017    [facility] at [facility] CATH LAB    ANGIOPLASTY Left 1/10/2018    Performed by [facility]rey [facility]afana MD at [facility] OR 2ND FLR    ANGIOPLASTY-PERCUTANEOUS TRANSLUMINAL (PTA) Left 2017    Performed by [facility] at [facility] OR 2ND FLR    ANGIOPLASTY-PERCUTANEOUS TRANSLUMINAL (PTA) Left 10/12/2016    Performed by [facility] at [facility] OR 2ND FLR    CARDIAC PACEMAKER PLACEMENT       SECTION      x2    DECLOT GRAFT PERCUTANEOUS Left 2017    Performed by [facility] at [facility] OR 2ND FLR    DECLOT-GRAFT Left 2016    Performed by Harjit Starr MD at [facility] CATH LAB    DECLOT-GRAFT Left 2016    Performed by [facility]arr [facility] at [facility] CATH LAB    ECHOCARDIOGRAM-TRANSESOPHAGEAL N/A 2013    Performed by Delmy Surgeon at [facility] DELMY    Fistulogram Left 2019    Performed by [facility] at [facility] CATH LAB    fistulogram Left 1/10/2018    Performed by [facility] MD at [facility] OR 2ND FLR    FISTULOGRAM Left 2017    Performed by [facility] at [facility] CATH LAB    FISTULOGRAM Left 10/12/2016    Performed by [facility] MD at [facility] OR 2ND FLR    FISTULOGRAM Left 2016    Performed by [facility]jit Starr MD at [facility] CATH LAB     Fistulogram, LUE AVG, possible intervention Left 2019    Performed by [facility] at [facility] CATH LAB    Fistulogram, OR 11 Left 2019    Performed by Tor[facility]ana MD at [facility] OR 2ND FLR    INJECTION-STEROID-EPIDURAL-TRANSFORAMINAL Right 2015    Performed by Pat[facility] Gutierrez MD at [facility] MGT    INJECTION-STEROID-EPIDURAL-TRANSFORAMINAL Right 2015    Performed by Patria [facility]ierrez MD at [facility] MGT    LEZHLHRLA-XMTZT-XDLRIAQOUKWGM / Left upper AV graft. Left 2/3/2016    Performed by Tor[facility] MD at [facility] OR 2ND FLR    SPTKIVINZ-QBOYGEJVC-IFESUYXTUTXAY N/A 2017    Performed by Bladimir Adorno MD at [facility] CATH LAB    OTHER SURGICAL HISTORY      loop recorder implant    PLACEMENT-STENT Left 2017    Performed by [facility] MD at [facility] OR 2ND FLR    PTA, AV FISTULA N/A 2019    Performed by Tor[facility] MD at [facility] CATH LAB    stent to fistula      TRANSESOPHAGEAL ECHOCARDIOGRAM (JARAD) N/A 2016    Performed by Sourav Machuca MD at [facility] CATH LAB    ULTRASOUND, UPPER GI TRACT, ENDOSCOPIC N/A 2014    Performed by Stewart Burgess MD at [facility] ENDO (2ND FLR)    VASCULAR SURGERY      fistula to L upper arm      Family History   Problem Relation Age of Onset    Kidney failure Mother     Hypertension Mother     Diabetes Mother     Coronary artery disease Father     Hypertension Father     Diabetes Father     Lupus Sister     Diabetes Maternal Grandmother     Colon cancer Neg Hx     Ovarian cancer Neg Hx     Breast cancer Neg Hx      Social History     Tobacco Use    Smoking status: Former Smoker     Packs/day: 0.50     Years: 10.00     Pack years: 5.00     Types: Cigarettes     Last attempt to quit: 1994     Years since quittin.1    Smokeless tobacco: Never Used   Substance Use Topics    Alcohol use: No     Alcohol/week: 0.0 oz     Comment: rarely    Drug use: No     Review of Systems  General: No fever.  No  chills.  Eyes: No visual changes.  Head: No headache.    Integument: No rashes or lesions.  Chest: + shortness of breath. + cough  Cardiovascular: No chest pain.  Abdomen: No abdominal pain.  No nausea or vomiting.  Urinary: No abnormal urination.  Neurologic: No focal weakness.  No numbness.  Hematologic: No easy bruising.  Endocrine: No excessive thirst or urination.    Physical Exam     Initial Vitals [05/30/19 1243]   BP Pulse Resp Temp SpO2   108/71 (!) 59 (!) 30 98 °F (36.7 °C) 95 %      MAP       --         Physical Exam    Nursing note and vitals reviewed.    Appearance: No acute distress.  Skin: No rashes seen.  Good turgor.  No abrasions.  No ecchymoses.  Eyes: No conjunctival injection.  ENT: Oropharynx clear.    Chest: Clear to auscultation bilaterally.  Good air movement.  No wheezes. rales  Cardiovascular: Regular rate and rhythm. Non tachycardiac  No murmurs. No gallops. No rubs.  Abdomen: morbid obese, belly soft.  Not distended.  Nontender.  No guarding.  No rebound.  Musculoskeletal: Good range of motion all joints.  No deformities.  Neck supple.  No meningismus.  Neurologic: Motor intact.  Sensation intact.  Cerebellar intact.  Cranial nerves intact.  Mental Status:  Alert and oriented x 3.  Appropriate, conversant.      ED Course   Procedures  Labs Reviewed   CBC W/ AUTO DIFFERENTIAL - Abnormal; Notable for the following components:       Result Value    Mean Corpuscular Hemoglobin 25.8 (*)     Mean Corpuscular Hemoglobin Conc 30.4 (*)     RDW 18.4 (*)     Platelets 143 (*)     Immature Granulocytes 0.7 (*)     Immature Grans (Abs) 0.06 (*)     Gran% 78.8 (*)     Lymph% 11.2 (*)     All other components within normal limits   TROPONIN I - Abnormal; Notable for the following components:    Troponin I 0.287 (*)     All other components within normal limits   B-TYPE NATRIURETIC PEPTIDE - Abnormal; Notable for the following components:     (*)     All other components within normal limits    PROTIME-INR - Abnormal; Notable for the following components:    Prothrombin Time 31.2 (*)     INR 3.2 (*)     All other components within normal limits   ISTAT PROCEDURE - Abnormal; Notable for the following components:    POC PH 7.476 (*)     POC PCO2 51.8 (*)     POC PO2 17 (*)     POC HCO3 38.3 (*)     POC SATURATED O2 25 (*)     POC TCO2 40 (*)     All other components within normal limits   COMPREHENSIVE METABOLIC PANEL     EKG Readings: (Independently Interpreted)   a fib, rate of 70, similar to previous     Imaging Results          X-Ray Chest AP Portable (Final result)  Result time 05/30/19 13:35:40    Final result by Rohith Lei MD (05/30/19 13:35:40)                 Impression:      See above      Electronically signed by: Rohith Lei MD  Date:    05/30/2019  Time:    13:35             Narrative:    EXAMINATION:  XR CHEST AP PORTABLE    CLINICAL HISTORY:  CHF;    TECHNIQUE:  Single frontal view of the chest was performed.    COMPARISON:  No 05/27/2019 ne    FINDINGS:  Pacemaker remain.  Cardiomegaly, pulmonary vascular congestion and edema.  No significant changes.                                 Medical Decision Making:   History:   Old Medical Records: I decided to obtain old medical records.  Initial Assessment:   Got short of breath after dialysis. States that she didn't get anywhere near her dry weight. This is frequent issue for the patient.  She is requiring BiPAP for respiratory support currently. Will get x-ray and talk to nephrology about acute dialysis here.  Possible ICU admission.    Independently Interpreted Test(s):   I have ordered and independently interpreted EKG Reading(s) - see prior notes  Clinical Tests:   Lab Tests: Ordered and Reviewed  Radiological Study: Ordered and Reviewed  Medical Tests: Ordered and Reviewed  ED Management:  Pulmonary edema on CXR; respiratory acidosis (compensated), K+ is OK.    Other:   I have discussed this case with another health care provider.        <> Summary of the Discussion: Nephrology             Scribe Attestation:   Scribe #1: I performed the above scribed service and the documentation accurately describes the services I performed. I attest to the accuracy of the note.    Attending Attestation:         Attending Critical Care:   Critical Care Times:   ==============================================================  · Total Critical Care Time - exclusive of procedural time: 35 minutes.  ==============================================================  Critical care was necessary to treat or prevent imminent or life-threatening deterioration of the following conditions: respiratory failure.               ED Course as of May 30 1407   Thu May 30, 2019   1358 Troponin I(!): 0.287 [DC]   1359 Troponin I(!): 0.287 [DC]   1406 POC PO2(!!): 17 [DC]      ED Course User Index  [DC] Cyrus Potts MD     Clinical Impression:       ICD-10-CM ICD-9-CM   1. Shortness of breath R06.02 786.05         Disposition:   Disposition: Admitted                        Cyrus Potts MD  05/31/19 0921

## 2019-05-30 NOTE — CONSULTS
Consult received. Patient will be admitted to the medical ICU. Full H&P to follow.    Aaron Nichols MD PGY-1  [facility] [place]

## 2019-05-30 NOTE — ED NOTES
Pt sitting up in bed receiving HD, no other changes in assessment.  Pt continues to tolerate Bi-pap.

## 2019-05-30 NOTE — HPI
"Ms. Sisi Medel is a 59 y/o  female with PMHx ESRD on HD, autonomic dysfunction, adrenal insufficiency, chronic systolic and diastolic heart failure (EF 35%),  pulmonary HTN, PAF, sarcoidosis, and recent admission for volume overload who presents to the hospital via EMS from her OP dialysis unit in respiratory distress.  She was recently discharged 2 days ago where during that admission her EDW was challenged and she left @ 111 kg (Previous EDW of 115 kg).  She reported to her OP dialysis unit this morning for her regular treatment with a pre-weight of 112.8 kg and according to OP nurse and patient, around 0.3-0.5 kg were only removed, unclear of why, but patient denies any cramping or hypotensive episodes during the treatment.  She reports feeling "ok" prior to HD, remained with some SOB on exertion but while she was walking to her care after treatment, she became acutely SOB and EMS was called by the dialysis staff and she was transferred to [facility] for further evaluation.  In the ED, she was placed on BiPAP with improvement in oxygeantion, CXR revealing bilateral pulmonary edema and BNP elevated above her baseline @ 812.  Chemistry without significant electrolyte abnormalities.  She denies any CP, fever, chills, N/V/D.  She endorses non-productive cough.  "

## 2019-05-30 NOTE — CONSULTS
"[facility]  Nephrology  Consult Note    Patient Name: Sisi Medel  MRN: [id number]  Admission Date: 5/30/2019  Hospital Length of Stay: 0 days  Attending Provider: Cyrus Potts MD   Primary Care Physician: Carlos A Caputo MD  Principal Problem:<principal problem not specified>    Inpatient consult to Nephrology  Consult performed by: Toni Fernando NP  Consult ordered by: Cyrus Potts MD  Reason for consult: ESRD        Subjective:     HPI: Ms. Sisi Medel is a 57 y/o  female with PMHx ESRD on HD, autonomic dysfunction, adrenal insufficiency, chronic systolic and diastolic heart failure (EF 35%),  pulmonary HTN, PAF, sarcoidosis, and recent admission for volume overload who presents to the hospital via EMS from her OP dialysis unit in respiratory distress.  She was recently discharged 2 days ago where during that admission her EDW was challenged and she left @ 111 kg (Previous EDW of 115 kg).  She reported to her OP dialysis unit this morning for her regular treatment with a pre-weight of 112.8 kg and according to OP nurse and patient, around 0.3-0.5 kg were only removed, unclear of why, but patient denies any cramping or hypotensive episodes during the treatment.  She reports feeling "ok" prior to HD, remained with some SOB on exertion but while she was walking to her care after treatment, she became acutely SOB and EMS was called by the dialysis staff and she was transferred to [facility] for further evaluation.  In the ED, she was placed on BiPAP with improvement in oxygeantion, CXR revealing bilateral pulmonary edema and BNP elevated above her baseline @ 812.  Chemistry without significant electrolyte abnormalities.  She denies any CP, fever, chills, N/V/D.  She endorses non-productive cough.    Past Medical History:   Diagnosis Date    Anemia in ESRD (end-stage renal disease) 3/7/2016    Anticoagulant long-term use     Cervical radiculopathy 7/20/2015    " CHF (congestive heart failure)     Chronic combined systolic and diastolic heart failure 2013    EF 20% , improved with Medical therapy, negative PET     Chronic respiratory failure with hypoxia 2013    On home oxygen at 2-5 liters    Closed fracture of proximal end of right fibula 2016    Complications due to renal dialysis device, implant, and graft     DDD (degenerative disc disease), lumbar 2015    Dependence on renal dialysis     Diabetes mellitus type II, controlled 2017    ESRD on hemodialysis 2016    Essential hypertension     Gout     Lateral meniscal tear 2016    Lumbar stenosis 2015    Obesity, Class III, BMI 40-49.9 (morbid obesity)     Pacemaker     Paroxysmal atrial fibrillation 2014    Not on anticoagulation    Peripheral neuropathy 2013    Personal history of gastric ulcer 3/19/2013    Pneumonia of left lower lobe due to infectious organism 3/10/2019    Sarcoidosis, lung 2009    Diagnosed in  with ocular manifestation, on 4L home O2 and PO steroids    Secondary pulmonary hypertension 2015    Seizure disorder 3/19/2013    Shingles 2013    Thyroid disease        Past Surgical History:   Procedure Laterality Date    ABDOMINAL SURGERY      ABLATION N/A 2017    Performed by [facility]orno [facility] at [facility] CATH LAB    ANGIOPLASTY Left 1/10/2018    Performed by Torrey [facility]afana MD at [facility] OR 2ND FLR    ANGIOPLASTY-PERCUTANEOUS TRANSLUMINAL (PTA) Left 2017    Performed by [facility] at [facility] OR 2ND FLR    ANGIOPLASTY-PERCUTANEOUS TRANSLUMINAL (PTA) Left 10/12/2016    Performed by [facility] at [facility] OR 2ND FLR    CARDIAC PACEMAKER PLACEMENT       SECTION      x2    DECLOT GRAFT PERCUTANEOUS Left 2017    Performed by [facility] at [facility] OR 2ND FLR    DECLOT-GRAFT Left 2016    Performed by Harjit Starr MD at [facility] CATH LAB    DECLOT-GRAFT  Left 6/20/2016    Performed by [facility] at [facility] CATH LAB    ECHOCARDIOGRAM-TRANSESOPHAGEAL N/A 6/27/2013    Performed by eDlmy Surgeon at [facility] DELMY    Fistulogram Left 4/8/2019    Performed by [facility] MD at [facility] CATH LAB    fistulogram Left 1/10/2018    Performed by Tor[facility]ana MD at [facility] OR 2ND FLR    FISTULOGRAM Left 9/13/2017    Performed by Tor[facility]ana MD at [facility] CATH LAB    FISTULOGRAM Left 10/12/2016    Performed by Tor[facility]ana MD at [facility] OR 2ND FLR    FISTULOGRAM Left 6/21/2016    Performed by Harjit Starr MD at [facility] CATH LAB    Fistulogram, LUE AVG, possible intervention Left 1/30/2019    Performed by Torrey [facility]afana MD at [facility] CATH LAB    Fistulogram, OR 11 Left 5/8/2019    Performed by Torrey Villafana MD at [facility] OR 2ND FLR    INJECTION-STEROID-EPIDURAL-TRANSFORAMINAL Right 7/20/2015    Performed by [facility]rez [facility] at [facility] MGT    INJECTION-STEROID-EPIDURAL-TRANSFORAMINAL Right 5/21/2015    [facility]    DWQHHGXQR-MTIVO-HWDCKEHSAVQRD / Left upper AV graft. Left 2/3/2016    Performed by Torrey [facility] MD at [facility] OR 2ND FLR    YZVAFHOVU-YOSLQPTGS-CNWEMCCMBTODV N/A 1/11/2017    Performed by Bladimir Adorno MD at [facility] CATH LAB    OTHER SURGICAL HISTORY      loop recorder implant    PLACEMENT-STENT Left 2/7/2017    Performed by Torrey [facility]afana MD at [facility] OR 2ND FLR    PTA, AV FISTULA N/A 1/30/2019    Performed by Tor[facility]ana MD at [facility] CATH LAB    stent to fistula      TRANSESOPHAGEAL ECHOCARDIOGRAM (JARAD) N/A 6/27/2016    Performed by Sourav Machuca MD at [facility] CATH LAB    ULTRASOUND, UPPER GI TRACT, ENDOSCOPIC N/A 1/9/2014    Performed by Stewart Burgess MD at [facility] ENDO (2ND FLR)    VASCULAR SURGERY      fistula to L upper arm        Review of patient's allergies indicates:   Allergen Reactions    Bactrim [sulfamethoxazole-trimethoprim] Other (See Comments)      Causes renal failure    Nsaids (non-steroidal anti-inflammatory drug) Other (See Comments)     Renal failure     No current facility-administered medications for this encounter.      Current Outpatient Medications   Medication    aspirin (ECOTRIN) 81 MG EC tablet    atorvastatin (LIPITOR) 80 MG tablet    benzonatate (TESSALON) 100 MG capsule    blood sugar diagnostic Strp    cinacalcet (SENSIPAR) 30 MG Tab    clopidogrel (PLAVIX) 75 mg tablet    fludrocortisone (FLORINEF) 0.1 mg Tab    gabapentin (NEURONTIN) 600 MG tablet    lancets Misc    levETIRAcetam (KEPPRA) 500 MG Tab    midodrine (PROAMATINE) 10 MG tablet    nortriptyline (PAMELOR) 25 MG capsule    omeprazole (PRILOSEC) 20 MG capsule    patiromer calcium sorbitex (VELTASSA) 16.8 gram PwPk    prednisoLONE acetate (PRED FORTE) 1 % DrpS    predniSONE (DELTASONE) 20 MG tablet    pregabalin (LYRICA) 100 MG capsule    PROLENSA 0.07 % Drop    DENISE-LINDA 0.8 mg Tab    RENVELA 800 mg Tab    sodium polystyrene (KAYEXALATE) powder    tiZANidine (ZANAFLEX) 4 MG tablet    TRADJENTA 5 mg Tab tablet    warfarin (COUMADIN) 5 MG tablet     Family History     Problem Relation (Age of Onset)    Coronary artery disease Father    Diabetes Mother, Father, Maternal Grandmother    Hypertension Mother, Father    Kidney failure Mother    Lupus Sister        Tobacco Use    Smoking status: Former Smoker     Packs/day: 0.50     Years: 10.00     Pack years: 5.00     Types: Cigarettes     Last attempt to quit: 1994     Years since quittin.1    Smokeless tobacco: Never Used   Substance and Sexual Activity    Alcohol use: No     Alcohol/week: 0.0 oz     Comment: rarely    Drug use: No    Sexual activity: Not Currently     Review of Systems   Constitutional: Positive for fatigue. Negative for chills and fever.   Respiratory: Positive for cough and shortness of breath. Negative for chest tightness and wheezing.    Cardiovascular: Negative for chest pain,  palpitations and leg swelling.   Gastrointestinal: Negative for abdominal pain, constipation, diarrhea, nausea and vomiting.   Genitourinary: Negative for dysuria, flank pain and hematuria.   Musculoskeletal: Negative for arthralgias, back pain, neck pain and neck stiffness.   Skin: Negative for color change and rash.   Neurological: Positive for weakness. Negative for dizziness, light-headedness and headaches.   Psychiatric/Behavioral: Negative for agitation, behavioral problems, confusion and decreased concentration.     Objective:     Vital Signs (Most Recent):  Temp: 98 °F (36.7 °C) (05/30/19 1243)  Pulse: (!) 59 (05/30/19 1243)  Resp: (!) 30 (05/30/19 1243)  BP: 108/71 (05/30/19 1243)  SpO2: 95 % (05/30/19 1243)  O2 Device (Oxygen Therapy): BiPAP (05/30/19 1308) Vital Signs (24h Range):  Temp:  [98 °F (36.7 °C)] 98 °F (36.7 °C)  Pulse:  [59] 59  Resp:  [30] 30  SpO2:  [95 %] 95 %  BP: (108)/(71) 108/71     Weight: 112 kg (246 lb 14.6 oz) (05/30/19 1243)  Body mass index is 37.11 kg/m².  Body surface area is 2.32 meters squared.    No intake/output data recorded.    Physical Exam   Constitutional: She is oriented to person, place, and time. She appears well-developed. She is cooperative. She appears ill. She appears distressed.   BiPAP   HENT:   Head: Atraumatic.   Right Ear: External ear normal.   Left Ear: External ear normal.   Susan-orbital edema   Eyes: Conjunctivae and EOM are normal. Right eye exhibits no discharge. Left eye exhibits no discharge.   Neck: Normal range of motion. Neck supple.   Cardiovascular: Regular rhythm. Bradycardia present. Exam reveals no gallop and no friction rub.   Murmur heard.  Pulmonary/Chest: Accessory muscle usage present. Tachypnea noted. She is in respiratory distress. She has decreased breath sounds. She has rales.   Abdominal: Soft. Bowel sounds are normal. She exhibits no distension. There is no tenderness.   Musculoskeletal: She exhibits no edema or deformity.    Neurological: She is alert and oriented to person, place, and time.   Skin: Skin is warm and dry. She is not diaphoretic.   RAISSA AVG       Significant Labs:  CBC:   Recent Labs   Lab 05/30/19  1313   WBC 8.63   RBC 5.00   HGB 12.9   HCT 42.4   *   MCV 85   MCH 25.8*   MCHC 30.4*     CMP:   Recent Labs   Lab 05/30/19  1313   GLU 90   CALCIUM 8.4*   ALBUMIN 2.9*   PROT 8.0   *   K 3.8   CO2 30*   CL 87*   BUN 10   CREATININE 2.9*   ALKPHOS 109   *   AST 94*   BILITOT 1.6*           Assessment/Plan:     End stage renal failure on dialysis  Outpatient HD unit: [facility]   -Nephrologist: Patient does not know  -HD tx days: TThS  -HD tx time: 4hrs 45mins    -HD access: LUE AVF  -HD modality: iHD  -Residual urine: Minimal  -EDW: 111.5 kg    Plan/Recommendations:  -STAT ultrafiltration to be done in the ED  -UF 3L as tolerated  -plan for additional UF/HD session tomorrow dependent on AM labs  -will continue challenging her EDW for volume optimization.  -renal diet when able to tolerate PO  -phos levels daily    Toni Gallegos, IVETH  Nephrology  [facility]

## 2019-05-30 NOTE — ED NOTES
HD RN called reporting pt had gone unresponsive to painful stimuli.  No loss of pulse, CBG checked with <29, See meds administered.

## 2019-05-30 NOTE — SIGNIFICANT EVENT
Called to bedside at roughly 1815 by dialysis nurse for unresponsiveness. Dialysis had just completed. Last BP at 1800 was 140 SBP. Internal medicine team notified. Patient unresponsive to sternal rub, initially, but eventually localized to pain. Fingerstick glucose 29. D50 given. Patient became slightly more alert. ABG 7.31/47/70 on BiPAP 10/5. Repeat FSG 50. Additional amp D50 given with repeat glucose 174. Patient much more alert now and continues to protect airway. Admitting internal medicine team consulted MICU to help transition care.

## 2019-05-30 NOTE — ED NOTES
Pt sitting up in bed on Bi-pap,given a blanket and pillow to lean on since it makes it easier for her to breathe. no other changes in assessment.  Pt continues to answer questions appropriately

## 2019-05-31 PROBLEM — Z29.9 PREVENTIVE MEASURE: Status: ACTIVE | Noted: 2019-01-01

## 2019-05-31 NOTE — H&P
[facility]  Critical Care Medicine  History & Physical    Patient Name: Sisi Medel  MRN: [id number]  Admission Date: 5/30/2019  Hospital Length of Stay: 0 days  Code Status: Full Code  Attending Physician: Radha Lockett MD   Primary Care Provider: Carlos A Caputo MD   Principal Problem: Acute on chronic respiratory failure with hypoxia and hypercapnia    Subjective:     HPI:  58F with a PMH of ESRD on HD, pulmonary sarcoid on 4L home O2, HFrEF 35% (3/2019), afib/flutter on coumadin s/p 6/2017 AVN ablation with PPM, DM2 (not on insulin), pHTN, YOANDY presents with shortness of breath. Son provides most of the history as patient is awake but non-verbal.  She was discharged yesterday and has been experiencing SOB since then, secondary to volume overload. She received HD this morning but it was incomplete, and she experienced worsening SOB while there. According to EMS, they were called by dialysis's staff because the patient became severely short of breath as she was walking to her car. She reported cough and fatigue. She endorses feeling sick with a wet nonproductive cough and abdominal pain. Denies fever, chills. She is anuric.     VBG in the ED revealed 7/47/52. She was placed on BiPAP in the ED and dialysis was performed with 2.75L removed and minimal improvement in respiratory symptoms. While in the ED awaiting a bed with hospital medicine, she became obtunded and was found to be hypoglycemic to 29. Mental status improved with dextrose.    She was recently admitted 5/27 - 5/29 to observation for respiratory disress which improved with HD.   Patient has been hospitalized multiple times for SOB and acute decompensated heart failure.    Hospital/ICU Course:  No notes on file     Past Medical History:   Diagnosis Date    Anemia in ESRD (end-stage renal disease) 3/7/2016    Anticoagulant long-term use     Cervical radiculopathy 7/20/2015    CHF (congestive heart failure)     Chronic  combined systolic and diastolic heart failure 2013    EF 20% , improved with Medical therapy, negative PET     Chronic respiratory failure with hypoxia 2013    On home oxygen at 2-5 liters    Closed fracture of proximal end of right fibula 2016    Complications due to renal dialysis device, implant, and graft     DDD (degenerative disc disease), lumbar 2015    Dependence on renal dialysis     Diabetes mellitus type II, controlled 2017    ESRD on hemodialysis 2016    Essential hypertension     Gout     Lateral meniscal tear 2016    Lumbar stenosis 2015    Obesity, Class III, BMI 40-49.9 (morbid obesity)     Pacemaker     Paroxysmal atrial fibrillation 2014    Not on anticoagulation    Peripheral neuropathy 2013    Personal history of gastric ulcer 3/19/2013    Pneumonia of left lower lobe due to infectious organism 3/10/2019    Sarcoidosis, lung 2009    Diagnosed in  with ocular manifestation, on 4L home O2 and PO steroids    Secondary pulmonary hypertension 2015    Seizure disorder 3/19/2013    Shingles 2013    Thyroid disease        Past Surgical History:   Procedure Laterality Date    ABDOMINAL SURGERY      ABLATION N/A 2017    Performed by Bladimir Adorno MD at [facility] CATH LAB    ANGIOPLASTY Left 1/10/2018    Performed by Torrey Villafana MD at [facility] OR 2ND FLR    ANGIOPLASTY-PERCUTANEOUS TRANSLUMINAL (PTA) Left 2017    Performed by Torrey Villafana MD at [facility] OR 2ND FLR    ANGIOPLASTY-PERCUTANEOUS TRANSLUMINAL (PTA) Left 10/12/2016    Performed by Torrey Villafana MD at [facility] OR 2ND FLR    CARDIAC PACEMAKER PLACEMENT       SECTION      x2    DECLOT GRAFT PERCUTANEOUS Left 2017    Performed by Torrey Villafana MD at [facility] OR 2ND FLR    DECLOT-GRAFT Left 2016    Performed by Harjit Starr MD at [facility] CATH LAB    DECLOT-GRAFT Left 2016    Performed by Harjit MANCUSO  MD Matty at [facility] CATH LAB    ECHOCARDIOGRAM-TRANSESOPHAGEAL N/A 6/27/2013    Performed by Delmy Surgeon at [facility] DELMY    Fistulogram Left 4/8/2019    Performed by Torrey Villafana MD at [facility] CATH LAB    fistulogram Left 1/10/2018    Performed by Torrey Villafana MD at [facility] OR 2ND FLR    FISTULOGRAM Left 9/13/2017    Performed by Torrey Villafana MD at [facility] CATH LAB    FISTULOGRAM Left 10/12/2016    Performed by Torrey Villafana MD at [facility] OR 2ND FLR    FISTULOGRAM Left 6/21/2016    Performed by Harjit Starr MD at [facility] CATH LAB    Fistulogram, LUE AVG, possible intervention Left 1/30/2019    Performed by Torrey Villafana MD at [facility] CATH LAB    Fistulogram, OR 11 Left 5/8/2019    Performed by Torrey Villafana MD at [facility] OR 2ND FLR    INJECTION-STEROID-EPIDURAL-TRANSFORAMINAL Right 7/20/2015    Performed by Patria Gutierrez MD at [facility] PAIN MGT    INJECTION-STEROID-EPIDURAL-TRANSFORAMINAL Right 5/21/2015    Performed by [facility]rez MD at [facility] MGT    YAFLQNDHE-NGLSW-WAFATLRLFVABQ / Left upper AV graft. Left 2/3/2016    Performed by Torrey Villafana MD at [facility] OR 2ND FLR    DPHRXVFYV-KQCXMDLAT-XIKDMFHHRLFTN N/A 1/11/2017    Performed by Bladimir Adorno MD at [facility] CATH LAB    OTHER SURGICAL HISTORY      loop recorder implant    PLACEMENT-STENT Left 2/7/2017    Performed by Torrey Villafana MD at [facility] OR 2ND FLR    PTA, AV FISTULA N/A 1/30/2019    Performed by Torrey Vlilafana MD at [facility] CATH LAB    stent to fistula      TRANSESOPHAGEAL ECHOCARDIOGRAM (JARAD) N/A 6/27/2016    Performed by Sourav Machuca MD at [facility] CATH LAB    ULTRASOUND, UPPER GI TRACT, ENDOSCOPIC N/A 1/9/2014    Performed by Stewart Burgess MD at [facility] ENDO (2ND FLR)    VASCULAR SURGERY      fistula to L upper arm        Review of patient's allergies indicates:   Allergen Reactions    Bactrim [sulfamethoxazole-trimethoprim] Other (See Comments)     Causes renal failure    Nsaids  (non-steroidal anti-inflammatory drug) Other (See Comments)     Renal failure       Family History     Problem Relation (Age of Onset)    Coronary artery disease Father    Diabetes Mother, Father, Maternal Grandmother    Hypertension Mother, Father    Kidney failure Mother    Lupus Sister        Tobacco Use    Smoking status: Former Smoker     Packs/day: 0.50     Years: 10.00     Pack years: 5.00     Types: Cigarettes     Last attempt to quit: 1994     Years since quittin.1    Smokeless tobacco: Never Used   Substance and Sexual Activity    Alcohol use: No     Alcohol/week: 0.0 oz     Comment: rarely    Drug use: No    Sexual activity: Not Currently      Review of Systems   Unable to perform ROS: Patient nonverbal     Objective:     Vital Signs (Most Recent):  Temp: 98 °F (36.7 °C) (19)  Pulse: 69 (19)  Resp: (!) 32 (19)  BP: 130/63 (19 1824)  SpO2: (!) 93 % (19) Vital Signs (24h Range):  Temp:  [98 °F (36.7 °C)] 98 °F (36.7 °C)  Pulse:  [59-75] 69  Resp:  [19-41] 32  SpO2:  [87 %-95 %] 93 %  BP: ()/() 130/63   Weight: 112 kg (246 lb 14.6 oz)  Body mass index is 37.11 kg/m².      Intake/Output Summary (Last 24 hours) at 2019 1922  Last data filed at 2019 1808  Gross per 24 hour   Intake 250 ml   Output 2750 ml   Net -2500 ml       Physical Exam   Constitutional: She appears well-developed and well-nourished. No distress.   Obese, non-verbal   HENT:   Head: Normocephalic and atraumatic.   Nose: Nose normal.   Eyes: EOM are normal. No scleral icterus.   Pupils are dilated bilaterally   Neck: Normal range of motion. No tracheal deviation present.   Cardiovascular: Normal rate and regular rhythm. Exam reveals no gallop and no friction rub.   No murmur heard.  Pulmonary/Chest: Effort normal.   On bipap.  Lung sounds obscured by bipap sounds.   Abdominal: Soft. Bowel sounds are normal. There is no tenderness.   Abdominal adiposity.   Tenderness to palpation in the LUQ, LLQ, RLQ.   Musculoskeletal: She exhibits no edema.   Chronic wounds in the bilateral lower extremities R>L that are healing well.     Neurological: She is alert.   Skin: Skin is warm and dry.       Vents:  Oxygen Concentration (%): 35 (05/30/19 1308)  Lines/Drains/Airways     Drain                 Hemodialysis AV Graft Left upper arm -- days         Hemodialysis AV Graft Left upper arm -- days          Peripheral Intravenous Line                 Peripheral IV - Single Lumen 05/09/19 1917 24 G Right Hand 21 days         Peripheral IV - Single Lumen 05/30/19 1328 20 G Right Antecubital less than 1 day              Significant Labs:    CBC/Anemia Profile:  Recent Labs   Lab 05/29/19  0445 05/30/19  1313   WBC 6.22 8.63   HGB 12.5 12.9   HCT 41.0 42.4   * 143*   MCV 86 85   RDW 18.4* 18.4*        Chemistries:  Recent Labs   Lab 05/29/19  0445 05/30/19  1313    132*   K 4.0 3.8   CL 98 87*   CO2 28 30*   BUN 25* 10   CREATININE 5.4* 2.9*   CALCIUM 8.1* 8.4*   ALBUMIN 2.8* 2.9*   PROT 7.4 8.0   BILITOT 0.7 1.6*   ALKPHOS 108 109   * 108*   * 94*   MG 2.1  --    PHOS 3.7  --        ABGs:   Recent Labs   Lab 05/30/19  1835   PH 7.311*   PCO2 46.9*   HCO3 23.7*   POCSATURATED 92*   BE -3     Lactic Acid: No results for input(s): LACTATE in the last 48 hours.    Significant Imaging: I have reviewed all pertinent imaging results/findings within the past 24 hours.    Assessment/Plan:     Neuro  Encephalopathy  --became obtunded in the ED likely secondary to combination of hypoglycemia and respiratory failure and/or polypharmacy  --NPO and hold home TCA, lyrica, zanaflex  --see epilepsy    Epilepsy  --continue home Keppra (IV for now)  --check keppra level  --if no improvement in mental status, consider EEG or neuro consult    Pulmonary  * Acute on chronic respiratory failure with hypoxia and hypercapnia  --acute mixed hypercapnic and hypoxic respiratory failure on  chronic hypercapnic respiratory failure on background of pulmonary sarcoidosis and YOANDY  --2/2 volume overload  --CXR (5/30) consistent with pulmonary vascular congestion and edema  --HD for volume removal  --Vanc/Zosyn started by day team in case of HCAP (does not meet SIRS at this time)    Cardiac/Vascular  Paroxysmal A-fib  --coumadin s/p 6/2017 AVN ablation with PPM  --supratherapeutic INR  --hold home coumadin for now  --daily INR    Acute on chronic combined systolic and diastolic heart failure  --BNP on admit 812 in setting of obesity  --Echo (3/11/2019) - EF 35%, decreased LV systolic and diastolic dysfunction  --CXR consistent with pulmonary vascular congestion and edema  --HD for volume removal    Renal/  ESRD on hemodialysis  --HD per nephrology  --takes sevelamer, cinacalcet at home   --lactate elevated in setting of ESRD  --on plavix at home for history of ?AVG thrombosis or ?stent    Immunology/Multi System  Pulmonary sarcoidosis  --on 4L home O2  --PFTs show severe restriction  --unable to swallow home prednisone 20 daily 2/2 mental status; IV methylprednisolone for now  --has history of steroid myopathy    Endocrine  Diabetes mellitus, type 2  --became hypoglycemic in the ED  --continue to monitor blood glucoses q4  --on steroids      Secondary adrenal insufficiency  --when able to swallow, continue home midodrine, prednisone, fludricortisone 100 mcg BID  --if hypoglycemia and hypotension recur, consider stress dose steroids.    GI  Transaminitis  --with hyperbilirubinemia  --hold home statin  --likely hepatic congestion from volume overload  --trend Tbili    Orthopedic  Wound of right leg  --appears to be healing well  --wound care      Case discussed with fellow.        Critical Care Daily Checklist:    A: Awake: RASS Goal/Actual Goal:    Actual:     B: Spontaneous Breathing Trial Performed?     C: SAT & SBT Coordinated?  n/a                      D: Delirium: CAM-ICU     E: Early Mobility  Performed? Yes   F: Feeding Goal:    Status:     Current Diet Order   Procedures    Diet NPO      AS: Analgesia/Sedation none   T: Thromboembolic Prophylaxis yes   H: HOB > 300 Yes   U: Stress Ulcer Prophylaxis (if needed) yes   G: Glucose Control yes   B: Bowel Function     I: Indwelling Catheter (Lines & Yepez) Necessity    D: De-escalation of Antimicrobials/Pharmacotherapies     Plan for the day/ETD Intubation watch    Code Status:  Family/Goals of Care: Full Code         Jennifer Pitts MD  Critical Care Medicine  [facility]

## 2019-05-31 NOTE — NURSING
Received pt from ED to CMICU 6057. On BiPAP awake and oriented, still with tachypnea and shortness of breath. Placed on cardiac monitor. Maintained on high back rest. Oriented to CMICU setup. CCT1 informed upon arrival.

## 2019-05-31 NOTE — ASSESSMENT & PLAN NOTE
--on 4L home O2  --PFTs show severe restriction  --unable to swallow home prednisone 20 daily 2/2 mental status; IV methylprednisolone for now, consider switching back to orals once she passes swallow eval  --has history of steroid myopathy

## 2019-05-31 NOTE — CONSULTS
Placed 18g, 10 cm Midline in right brachial vein using u/s guidance.  Max dwell date 6/29/2019.  Lot # [id number].

## 2019-05-31 NOTE — HPI
Ms. Medel is a 58 year old lady with a past medical history of pulmonary sarcoidosis on 3-5L home oxygen, Class 3 HFrEF (3/2019 EF 35%), perm Afib/flutter s/p 6/2017 AVN ablation & PPM, ESRD (TTS), Type 2 DM (3/2019 A1c 6.2%), pulmonary HTN, YOANDY, and orthostatic hypotension who presented to [facility] [place] with complaints of shortness of breath. The patient went to dialysis today and apparently could not get down to her dry weight, per neprhology should be 111kg but patient only got to 112. Per the patient her BP's were fine and she did not have any trouble with the dialysis. Upon returning to the car the patient had worsening shortness of breath and had to increase O2 to 5L. Per the patient and her family these symptoms have been persistent for several weeks. She was discharged from [facility] yesterday (5/29/19) following a 2 day admission for similar complaints. At that time the patient needed dialysis for volume overload and new dry weight was established.    Of note patient has had frequent hospitalizations in the past. These admissions are usually for volume overload, CHF, and shortness of breath. She follows with pulmonology as an outpatient and is on chronic steroids. Per last outpatient note, continued on prednisone 20mg daily for sarcoid as she has had difficulty and increased hospitalizations on lower doses.    In the ED the patient was on BiPAP, chest Xray showing significant pulmonary edema. Nephology was consulted and started dialysis in room. On evaluation patient was uncomfortable on bipap but no increased work of breathing noted. Patient endorsing neck pain from the bipap and leg pain. She denied recent fevers or chills, but did endorse a chronic cough that had been worsening recently.

## 2019-05-31 NOTE — ASSESSMENT & PLAN NOTE
--now off bipap and on home O2 requirement  --2/2 volume overload  --CXR (5/30) consistent with pulmonary vascular congestion and edema  --HD for volume removal. 2.75 L removed on 5/30.  --Vanc/Zosyn started by day team in case of HCAP (does not meet SIRS at this time)  --needs to wear CPAP or bipap at night

## 2019-05-31 NOTE — ASSESSMENT & PLAN NOTE
--with hyperbilirubinemia  --hold home statin  --likely hepatic congestion from volume overload  --trend Tbili

## 2019-05-31 NOTE — ASSESSMENT & PLAN NOTE
--when able to swallow, continue home midodrine, prednisone, fludricortisone 100 mcg BID  --if hypoglycemia and hypotension recur, consider stress dose steroids.

## 2019-05-31 NOTE — ASSESSMENT & PLAN NOTE
--acute mixed hypercapnic and hypoxic respiratory failure on chronic hypercapnic respiratory failure on background of pulmonary sarcoidosis and YOANDY  --2/2 volume overload  --CXR (5/30) consistent with pulmonary vascular congestion and edema  --HD for volume removal

## 2019-05-31 NOTE — PLAN OF CARE
Problem: Adult Inpatient Plan of Care  Goal: Plan of Care Review  Outcome: Ongoing (interventions implemented as appropriate)    No acute events throughout day. See vital signs and assessments in flowsheets. See below for updates on today's progress.     Pulmonary: -On BiPAP at 40% -Sats at % -RR 25 upto 40 cpm     Cardiovascular: -HR paced at 68-70 -BP at 100-120s/60-80s    Neurological: -WNL -cannot sustain conversation d/t SOB    Gastrointestinal: -NPO    Genitourinary: -Anuric last HD at ED    Endocrine: -BG at 128 at HS    Skin/Bath: -Noted scab/healing wound on right lower shin and bilateral toes -PIV x 1 -CN to try US guided IV insertion this AM       Patient progressing towards goals as tolerated, plan of care communicated and reviewed with Sisi Medel and family. All concerns addressed. Will continue to monitor.

## 2019-05-31 NOTE — SUBJECTIVE & OBJECTIVE
Interval History/Significant Events: admitted from ED to ICU overnight.  Refused to continue wearing bipap this morning.  satting well on home requirement of 4 L NC.     Review of Systems   Unable to perform ROS: Patient nonverbal     Objective:     Vital Signs (Most Recent):  Temp: 98 °F (36.7 °C) (05/31/19 0300)  Pulse: 69 (05/31/19 0500)  Resp: (!) 27 (05/31/19 0500)  BP: 120/81 (05/31/19 0500)  SpO2: 99 % (05/31/19 0500) Vital Signs (24h Range):  Temp:  [98 °F (36.7 °C)-99.6 °F (37.6 °C)] 98 °F (36.7 °C)  Pulse:  [59-75] 69  Resp:  [19-57] 27  SpO2:  [85 %-100 %] 99 %  BP: ()/() 120/81   Weight: 108.2 kg (238 lb 8.6 oz)  Body mass index is 36.27 kg/m².      Intake/Output Summary (Last 24 hours) at 5/31/2019 0538  Last data filed at 5/30/2019 1808  Gross per 24 hour   Intake 250 ml   Output 2750 ml   Net -2500 ml       Physical Exam   Constitutional: She appears well-developed and well-nourished. No distress.   Obese, non-verbal   HENT:   Head: Normocephalic and atraumatic.   Nose: Nose normal.   Eyes: EOM are normal. No scleral icterus.   Neck: Normal range of motion. No tracheal deviation present.   Cardiovascular: Normal rate and regular rhythm. Exam reveals no gallop and no friction rub.   No murmur heard.  Pulmonary/Chest: Effort normal.   On bipap.  Lung sounds obscured by bipap sounds.   Abdominal: Soft. Bowel sounds are normal. There is no tenderness.   Abdominal adiposity.  Tenderness to palpation diffusely   Musculoskeletal: She exhibits no edema.   Chronic wounds in the bilateral lower extremities R>L that are healing well.     Neurological: She is alert.   Arousable.  Follows most commands.   Skin: Skin is warm and dry.       Vents:  Oxygen Concentration (%): 40 (05/31/19 0500)  Lines/Drains/Airways     Drain                 Hemodialysis AV Graft Left upper arm -- days         Hemodialysis AV Graft Left upper arm -- days          Peripheral Intravenous Line                 Peripheral IV -  Single Lumen 05/09/19 1917 24 G Right Hand 21 days         Peripheral IV - Single Lumen 05/30/19 1328 20 G Right Antecubital less than 1 day              Significant Labs:    CBC/Anemia Profile:  Recent Labs   Lab 05/30/19  1313 05/31/19  0330   WBC 8.63 12.00   HGB 12.9 12.8   HCT 42.4 41.2   * 120*   MCV 85 84   RDW 18.4* 18.3*        Chemistries:  Recent Labs   Lab 05/30/19  1313 05/31/19  0330   * 134*   K 3.8 4.5   CL 87* 91*   CO2 30* 26   BUN 10 19   CREATININE 2.9* 4.3*   CALCIUM 8.4* 7.8*   ALBUMIN 2.9* 2.6*   PROT 8.0 7.6   BILITOT 1.6* 1.3*   ALKPHOS 109 105   * 142*   AST 94* 187*       ABGs:   Recent Labs   Lab 05/30/19  2251   PH 7.451*   PCO2 44.1   HCO3 30.7*   POCSATURATED 95   BE 7     Lactic Acid:   Recent Labs   Lab 05/30/19  1833 05/30/19  2110   LACTATE 7.5* 3.7*       Significant Imaging: I have reviewed all pertinent imaging results/findings within the past 24 hours.

## 2019-05-31 NOTE — ED NOTES
Spoke with CMICU resident about pt failing dysphagia screen for oral meds. Will cont to monitor pt and wait for new orders.

## 2019-05-31 NOTE — ASSESSMENT & PLAN NOTE
--coumadin s/p 6/2017 AVN ablation with PPM  --supratherapeutic INR  --hold home coumadin for now  --daily INR

## 2019-05-31 NOTE — ASSESSMENT & PLAN NOTE
--acute mixed hypercapnic and hypoxic respiratory failure on chronic hypercapnic respiratory failure on background of pulmonary sarcoidosis and YOANDY  --2/2 volume overload  --CXR (5/30) consistent with pulmonary vascular congestion and edema  --HD for volume removal  --Vanc/Zosyn started by day team in case of HCAP (does not meet SIRS at this time)

## 2019-05-31 NOTE — ASSESSMENT & PLAN NOTE
Patient admitted for shortness of breath, CXR showing significant pulmonary edema. Nephrology consulted in ED for volume removal, patient unable to get to dry weight today at outpatient dialysis. Recently discharged on 5/29/19. Patient also with history of sarcoid which per outpatient pulmonary notes is rather advanced, requiring daily chronic steroids. Likely has low reserve so acute hypoxia likely 2/2 volume overload.    - Appreciate nephrology's help, dialysis per neprhology  - Will try to transition patient off BiPAP following completion of dialysis  - Continuing prednisone 20mg daily    Update: Patient became acutely obtunded following completion of dialysis. Found to have BG in the 20's given 2 amps of D50 and patient's mental status improved. However now with significant work of breathing. ICU consulted and patient to be admitted to ICU service.

## 2019-05-31 NOTE — SUBJECTIVE & OBJECTIVE
Past Medical History:   Diagnosis Date    Anemia in ESRD (end-stage renal disease) 3/7/2016    Anticoagulant long-term use     Cervical radiculopathy 2015    CHF (congestive heart failure)     Chronic combined systolic and diastolic heart failure 2013    EF 20% , improved with Medical therapy, negative PET     Chronic respiratory failure with hypoxia 2013    On home oxygen at 2-5 liters    Closed fracture of proximal end of right fibula 2016    Complications due to renal dialysis device, implant, and graft     DDD (degenerative disc disease), lumbar 2015    Dependence on renal dialysis     Diabetes mellitus type II, controlled 2017    ESRD on hemodialysis 2016    Essential hypertension     Gout     Lateral meniscal tear 2016    Lumbar stenosis 2015    Obesity, Class III, BMI 40-49.9 (morbid obesity)     Pacemaker     Paroxysmal atrial fibrillation 2014    Not on anticoagulation    Peripheral neuropathy 2013    Personal history of gastric ulcer 3/19/2013    Pneumonia of left lower lobe due to infectious organism 3/10/2019    Sarcoidosis, lung 2009    Diagnosed in  with ocular manifestation, on 4L home O2 and PO steroids    Secondary pulmonary hypertension 2015    Seizure disorder 3/19/2013    Shingles 2013    Thyroid disease        Past Surgical History:   Procedure Laterality Date    ABDOMINAL SURGERY      ABLATION N/A 2017    Performed by Bladimir Adorno MD at [facility] CATH LAB    ANGIOPLASTY Left 1/10/2018    Performed by Torrey Villafana MD at [facility] OR 2ND FLR    ANGIOPLASTY-PERCUTANEOUS TRANSLUMINAL (PTA) Left 2017    Performed by Torrey Villafana MD at [facility] OR 2ND FLR    ANGIOPLASTY-PERCUTANEOUS TRANSLUMINAL (PTA) Left 10/12/2016    Performed by Torrey Villafana MD at [facility] OR 2ND FLR    CARDIAC PACEMAKER PLACEMENT       SECTION      x2    DECLOT GRAFT PERCUTANEOUS Left  2/7/2017    Performed by Torrey [facility]afana MD at [facility] OR 2ND FLR    DECLOT-GRAFT Left 6/21/2016    Performed by Harjit Starr MD at [facility] CATH LAB    DECLOT-GRAFT Left 6/20/2016    Performed by Harjit Starr MD at [facility] CATH LAB    ECHOCARDIOGRAM-TRANSESOPHAGEAL N/A 6/27/2013    Performed by Delmy Surgeon at [facility] DELMY    Fistulogram Left 4/8/2019    Performed by Torrey Villafana MD at [facility] CATH LAB    fistulogram Left 1/10/2018    Performed by Torrey Villafana MD at [facility] OR 2ND FLR    FISTULOGRAM Left 9/13/2017    Performed by Torrey Villafana MD at [facility] CATH LAB    FISTULOGRAM Left 10/12/2016    Performed by Torrey Villafana MD at [facility] OR 2ND FLR    FISTULOGRAM Left 6/21/2016    Performed by Harjit Starr MD at [facility] CATH LAB    Fistulogram, LUE AVG, possible intervention Left 1/30/2019    Performed by Torrey Villafana MD at [facility] CATH LAB    Fistulogram, OR 11 Left 5/8/2019    Performed by Torrey Villafana MD at [facility] OR 2ND FLR    INJECTION-STEROID-EPIDURAL-TRANSFORAMINAL Right 7/20/2015    Performed by Pat[facility]rez MD at [facility] PAIN MGT    INJECTION-STEROID-EPIDURAL-TRANSFORAMINAL Right 5/21/2015    Performed by Patria Gutierrez MD at [facility] PAIN MGT    TYGLUCIXZ-MGZVG-CJPJXTDJIBPVM / Left upper AV graft. Left 2/3/2016    Performed by Torrey Villafana MD at [facility] OR 2ND FLR    ICUUHIIDJ-NVUKQFIAC-AHTEHDPWZLHVF N/A 1/11/2017    Performed by Bladimir Adorno MD at [facility] CATH LAB    OTHER SURGICAL HISTORY      loop recorder implant    PLACEMENT-STENT Left 2/7/2017    Performed by Torrey Villafana MD at [facility] OR 2ND FLR    PTA, AV FISTULA N/A 1/30/2019    Performed by Torrey Villafana MD at [facility] CATH LAB    stent to fistula      TRANSESOPHAGEAL ECHOCARDIOGRAM (JARAD) N/A 6/27/2016    Performed by Sourav Machuca MD at [facility] CATH LAB    ULTRASOUND, UPPER GI TRACT, ENDOSCOPIC N/A 1/9/2014    Performed by Stewart Burgess MD at [facility] ENDO (2ND FLR)    VASCULAR SURGERY       fistula to L upper arm        Review of patient's allergies indicates:   Allergen Reactions    Bactrim [sulfamethoxazole-trimethoprim] Other (See Comments)     Causes renal failure    Nsaids (non-steroidal anti-inflammatory drug) Other (See Comments)     Renal failure       No current facility-administered medications on file prior to encounter.      Current Outpatient Medications on File Prior to Encounter   Medication Sig    aspirin (ECOTRIN) 81 MG EC tablet Take 81 mg by mouth once daily.    atorvastatin (LIPITOR) 80 MG tablet TAKE 1 TABLET BY MOUTH EVERY EVENING    benzonatate (TESSALON) 100 MG capsule Take 1 capsule (100 mg total) by mouth 3 (three) times daily. for 10 days    blood sugar diagnostic Strp 1 each by Misc.(Non-Drug; Combo Route) route once daily. (Patient taking differently: 1 each by Misc.(Non-Drug; Combo Route) route 2 (two) times daily. )    cinacalcet (SENSIPAR) 30 MG Tab Take 30 mg by mouth daily with breakfast.    clopidogrel (PLAVIX) 75 mg tablet TAKE 1 TABLET(75 MG) BY MOUTH EVERY DAY    fludrocortisone (FLORINEF) 0.1 mg Tab Take 1 tablet (100 mcg total) by mouth 2 (two) times daily.    gabapentin (NEURONTIN) 600 MG tablet Take 600 mg by mouth 2 (two) times daily.     lancets Misc 1 each by Misc.(Non-Drug; Combo Route) route once daily. (Patient taking differently: 1 each by Misc.(Non-Drug; Combo Route) route 2 (two) times daily. )    levETIRAcetam (KEPPRA) 500 MG Tab TAKE 1 TABLET BY MOUTH TWICE DAILY    midodrine (PROAMATINE) 10 MG tablet Take 1 tablet (10 mg total) by mouth 3 (three) times daily.    nortriptyline (PAMELOR) 25 MG capsule Take 1 capsule by mouth nightly    omeprazole (PRILOSEC) 20 MG capsule TAKE 1 CAPSULE BY MOUTH EVERY DAY    patiromer calcium sorbitex (VELTASSA) 16.8 gram PwPk Take 33.6 g by mouth once daily.     prednisoLONE acetate (PRED FORTE) 1 % DrpS INSTILL ONE DROP INTO  LEFT EYE THREE TIMES A DAY    predniSONE (DELTASONE) 20 MG tablet  Take 0.5 tablets (10 mg total) by mouth once daily.    pregabalin (LYRICA) 100 MG capsule Take 1 capsule (100 mg total) by mouth 3 (three) times daily.    PROLENSA 0.07 % Drop INSTILL 1 DROP IN LEFT / RIGHT EYE QD    DENISE-LINDA 0.8 mg Tab TAKE 1 TABLET BY MOUTH ONCE DAILY    RENVELA 800 mg Tab TAKE 1 TABLET BY MOUTH THREE TIMES DAILY WITH MEALS    sodium polystyrene (KAYEXALATE) powder TK 30 GRAMS PO D    tiZANidine (ZANAFLEX) 4 MG tablet TAKE 1 TABLET BY MOUTH EVERY DAY AS NEEDED FOR MUSCLE SPASMS    TRADJENTA 5 mg Tab tablet TAKE 1 TABLET(5 MG) BY MOUTH EVERY DAY    warfarin (COUMADIN) 5 MG tablet TAKE 1 TABLET BY MOUTH EVERY DAY EXCEPT TH 1A TABLETS ON MONDAY AND FRIDAY OR AS DIRECTED (Patient taking differently: TAKE 1 TABLET BY MOUTH EVERY DAY OR AS DIRECTED)     Family History     Problem Relation (Age of Onset)    Coronary artery disease Father    Diabetes Mother, Father, Maternal Grandmother    Hypertension Mother, Father    Kidney failure Mother    Lupus Sister        Tobacco Use    Smoking status: Former Smoker     Packs/day: 0.50     Years: 10.00     Pack years: 5.00     Types: Cigarettes     Last attempt to quit: 1994     Years since quittin.1    Smokeless tobacco: Never Used   Substance and Sexual Activity    Alcohol use: No     Alcohol/week: 0.0 oz     Comment: rarely    Drug use: No    Sexual activity: Not Currently     Review of Systems   Constitutional: Positive for fatigue. Negative for chills and fever.   HENT: Negative for rhinorrhea, sneezing and sore throat.    Eyes: Negative for visual disturbance.   Respiratory: Positive for cough (nonproductive) and shortness of breath. Negative for chest tightness and wheezing.    Cardiovascular: Negative for chest pain, palpitations and leg swelling.   Gastrointestinal: Negative for abdominal pain, constipation, diarrhea, nausea and vomiting.   Genitourinary: Negative for dysuria, flank pain and hematuria.   Musculoskeletal:  Positive for myalgias and neck pain. Negative for arthralgias, back pain and neck stiffness.   Skin: Negative for color change and rash.   Neurological: Positive for weakness. Negative for dizziness, light-headedness and headaches.   Psychiatric/Behavioral: Negative for agitation, behavioral problems, confusion and decreased concentration.     Objective:     Vital Signs (Most Recent):  Temp: 99.6 °F (37.6 °C) (05/30/19 2001)  Pulse: 69 (05/30/19 2032)  Resp: (!) 26 (05/30/19 2032)  BP: (!) 111/57 (05/30/19 2032)  SpO2: 97 % (05/30/19 2032) Vital Signs (24h Range):  Temp:  [98 °F (36.7 °C)-99.6 °F (37.6 °C)] 99.6 °F (37.6 °C)  Pulse:  [59-75] 69  Resp:  [19-41] 26  SpO2:  [87 %-100 %] 97 %  BP: ()/() 111/57     Weight: 112 kg (246 lb 14.6 oz)  Body mass index is 37.11 kg/m².    Physical Exam   Constitutional: She appears well-developed and well-nourished. She appears distressed.   Ill appearing, on BiPAP   HENT:   Head: Normocephalic and atraumatic.   Eyes: Pupils are equal, round, and reactive to light. EOM are normal. No scleral icterus.   Neck: Normal range of motion. Neck supple.   Cardiovascular: Regular rhythm. Tachycardia present. Exam reveals no friction rub.   No murmur heard.  Pulmonary/Chest: Effort normal. No stridor. No respiratory distress. She has rales (bialteral lung bases).   Uncomfortable on bipap   Abdominal: Soft. Bowel sounds are normal. She exhibits no distension. There is no tenderness.   Musculoskeletal: She exhibits no edema or deformity.   Nursing note and vitals reviewed.        CRANIAL NERVES     CN III, IV, VI   Pupils are equal, round, and reactive to light.  Extraocular motions are normal.        Significant Labs:   CBC:   Recent Labs   Lab 05/29/19  0445 05/30/19  1313   WBC 6.22 8.63   HGB 12.5 12.9   HCT 41.0 42.4   * 143*     CMP:   Recent Labs   Lab 05/29/19  0445 05/30/19  1313    132*   K 4.0 3.8   CL 98 87*   CO2 28 30*    90   BUN 25* 10    CREATININE 5.4* 2.9*   CALCIUM 8.1* 8.4*   PROT 7.4 8.0   ALBUMIN 2.8* 2.9*   BILITOT 0.7 1.6*   ALKPHOS 108 109   * 94*   * 108*   ANIONGAP 12 15   EGFRNONAA 8.2* 17.3*     Lactic Acid:   Recent Labs   Lab 05/30/19  1833   LACTATE 7.5*     Troponin:   Recent Labs   Lab 05/30/19  1313   TROPONINI 0.287*     All pertinent labs within the past 24 hours have been reviewed.    Significant Imaging: I have reviewed all pertinent imaging results/findings within the past 24 hours.     Rohith Lei MD 5/30/2019       Narrative     EXAMINATION:  XR CHEST AP PORTABLE    CLINICAL HISTORY:  CHF;    TECHNIQUE:  Single frontal view of the chest was performed.    COMPARISON:  No 05/27/2019 ne    FINDINGS:  Pacemaker remain.  Cardiomegaly, pulmonary vascular congestion and edema.  No significant changes.      Impression       See above      Electronically signed by: Rohith Lei MD  Date: 05/30/2019  Time: 13:35

## 2019-05-31 NOTE — ASSESSMENT & PLAN NOTE
--on 4L home O2  --PFTs show severe restriction  --unable to swallow home prednisone 20 daily 2/2 mental status; IV methylprednisolone for now  --has history of steroid myopathy

## 2019-05-31 NOTE — ASSESSMENT & PLAN NOTE
--coumadin s/p 6/2017 AVN ablation with PPM  --daily INR  --will consider resuming warfarin now that she is back within therapeutic range

## 2019-05-31 NOTE — PROGRESS NOTES
[facility]  Critical Care Medicine  Progress Note    Patient Name: Sisi Medel  MRN: [id number]  Admission Date: 5/30/2019  Hospital Length of Stay: 1 days  Code Status: Full Code  Attending Provider: Bang Cordero MD  Primary Care Provider: Carlos A Caputo MD   Principal Problem: Acute on chronic respiratory failure with hypoxia and hypercapnia    Subjective:     HPI:  58F with a PMH of ESRD on HD, pulmonary sarcoid on 4L home O2, HFrEF 35% (3/2019), afib/flutter on coumadin s/p 6/2017 AVN ablation with PPM, DM2 (not on insulin), pHTN, YOANDY presents with shortness of breath. Son provides most of the history as patient is awake but non-verbal.  She was discharged yesterday and has been experiencing SOB since then, secondary to volume overload. She received HD this morning but it was incomplete, and she experienced worsening SOB while there. According to EMS, they were called by dialysis's staff because the patient became severely short of breath as she was walking to her car. She reported cough and fatigue. She endorses feeling sick with a wet nonproductive cough and abdominal pain. Denies fever, chills. She is anuric.     VBG in the ED revealed 7/47/52. She was placed on BiPAP in the ED and dialysis was performed with 2.75L removed and minimal improvement in respiratory symptoms. While in the ED awaiting a bed with hospital medicine, she became obtunded and was found to be hypoglycemic to 29. Mental status improved with dextrose.    She was recently admitted 5/27 - 5/29 to observation for respiratory disress which improved with HD.   Patient has been hospitalized multiple times for SOB and acute decompensated heart failure.    Hospital/ICU Course:  No notes on file    Interval History/Significant Events: admitted from ED to ICU overnight.  Refused to continue wearing bipap this morning.  satting well on home requirement of 4 L NC.     Review of Systems   Unable to perform ROS:  Patient nonverbal     Objective:     Vital Signs (Most Recent):  Temp: 98 °F (36.7 °C) (05/31/19 0300)  Pulse: 69 (05/31/19 0500)  Resp: (!) 27 (05/31/19 0500)  BP: 120/81 (05/31/19 0500)  SpO2: 99 % (05/31/19 0500) Vital Signs (24h Range):  Temp:  [98 °F (36.7 °C)-99.6 °F (37.6 °C)] 98 °F (36.7 °C)  Pulse:  [59-75] 69  Resp:  [19-57] 27  SpO2:  [85 %-100 %] 99 %  BP: ()/() 120/81   Weight: 108.2 kg (238 lb 8.6 oz)  Body mass index is 36.27 kg/m².      Intake/Output Summary (Last 24 hours) at 5/31/2019 0538  Last data filed at 5/30/2019 1808  Gross per 24 hour   Intake 250 ml   Output 2750 ml   Net -2500 ml       Physical Exam   Constitutional: She appears well-developed and well-nourished. No distress.   Obese, non-verbal   HENT:   Head: Normocephalic and atraumatic.   Nose: Nose normal.   Eyes: EOM are normal. No scleral icterus.   Neck: Normal range of motion. No tracheal deviation present.   Cardiovascular: Normal rate and regular rhythm. Exam reveals no gallop and no friction rub.   No murmur heard.  Pulmonary/Chest: Effort normal.   On bipap.  Lung sounds obscured by bipap sounds.   Abdominal: Soft. Bowel sounds are normal. There is no tenderness.   Abdominal adiposity.  Tenderness to palpation diffusely   Musculoskeletal: She exhibits no edema.   Chronic wounds in the bilateral lower extremities R>L that are healing well.     Neurological: She is alert.   Arousable.  Follows most commands.   Skin: Skin is warm and dry.       Vents:  Oxygen Concentration (%): 40 (05/31/19 0500)  Lines/Drains/Airways     Drain                 Hemodialysis AV Graft Left upper arm -- days         Hemodialysis AV Graft Left upper arm -- days          Peripheral Intravenous Line                 Peripheral IV - Single Lumen 05/09/19 1917 24 G Right Hand 21 days         Peripheral IV - Single Lumen 05/30/19 1328 20 G Right Antecubital less than 1 day              Significant Labs:    CBC/Anemia Profile:  Recent Labs   Lab  05/30/19  1313 05/31/19  0330   WBC 8.63 12.00   HGB 12.9 12.8   HCT 42.4 41.2   * 120*   MCV 85 84   RDW 18.4* 18.3*        Chemistries:  Recent Labs   Lab 05/30/19  1313 05/31/19  0330   * 134*   K 3.8 4.5   CL 87* 91*   CO2 30* 26   BUN 10 19   CREATININE 2.9* 4.3*   CALCIUM 8.4* 7.8*   ALBUMIN 2.9* 2.6*   PROT 8.0 7.6   BILITOT 1.6* 1.3*   ALKPHOS 109 105   * 142*   AST 94* 187*       ABGs:   Recent Labs   Lab 05/30/19  2251   PH 7.451*   PCO2 44.1   HCO3 30.7*   POCSATURATED 95   BE 7     Lactic Acid:   Recent Labs   Lab 05/30/19  1833 05/30/19  2110   LACTATE 7.5* 3.7*       Significant Imaging: I have reviewed all pertinent imaging results/findings within the past 24 hours.      ABG  Recent Labs   Lab 05/31/19  0629   PH 7.385   PO2 69*   PCO2 46.0*   HCO3 27.5   BE 2     Assessment/Plan:     Neuro  Encephalopathy  --became obtunded in the ED likely secondary to combination of hypoglycemia and respiratory failure and/or polypharmacy  --NPO and hold home TCA, lyrica, zanaflex  --now following most commands  --see epilepsy    Epilepsy  --continue home Keppra (IV for now)  --check keppra level  --if no improvement in mental status, consider EEG or neuro consult    Pulmonary  * Acute on chronic respiratory failure with hypoxia and hypercapnia  --now off bipap and on home O2 requirement  --2/2 volume overload  --CXR (5/30) consistent with pulmonary vascular congestion and edema  --HD for volume removal. 2.75 L removed on 5/30.  --Vanc/Zosyn started by day team in case of HCAP (does not meet SIRS at this time)  --needs to wear CPAP or bipap at night    Cardiac/Vascular  Paroxysmal A-fib  --coumadin s/p 6/2017 AVN ablation with PPM  --daily INR  --will consider resuming warfarin now that she is back within therapeutic range    Acute on chronic combined systolic and diastolic heart failure  --BNP on admit 812 in setting of obesity  --Echo (3/11/2019) - EF 35%, decreased LV systolic and diastolic  dysfunction  --CXR consistent with pulmonary vascular congestion and edema  --HD for volume removal    Renal/  ESRD on hemodialysis  --HD per nephrology  --takes sevelamer, cinacalcet at home   --lactate elevated in setting of ESRD  --on plavix at home for history of ?AVG thrombosis or ?stent    Immunology/Multi System  Pulmonary sarcoidosis  --on 4L home O2  --PFTs show severe restriction  --unable to swallow home prednisone 20 daily 2/2 mental status; IV methylprednisolone for now, consider switching back to orals once she passes swallow eval  --has history of steroid myopathy    Endocrine  Diabetes mellitus, type 2  --became hypoglycemic in the ED  --continue to monitor blood glucoses q4  --on steroids      Secondary adrenal insufficiency  --when able to swallow, continue home midodrine, prednisone, fludricortisone 100 mcg BID  --if hypoglycemia and hypotension recur, consider stress dose steroids; however overnight blood pressure and glucose has been stable    GI  Transaminitis  --with hyperbilirubinemia  --hold home statin  --likely hepatic congestion from volume overload  --trend Tbili    Orthopedic  Wound of right leg  --appears to be healing well  --wound care       Critical Care Daily Checklist:    A: Awake: RASS Goal/Actual Goal:    Actual: Horton Agitation Sedation Scale (RASS): Alert and calm   B: Spontaneous Breathing Trial Performed?     C: SAT & SBT Coordinated?  n/a                      D: Delirium: CAM-ICU Overall CAM-ICU: Negative   E: Early Mobility Performed? Yes   F: Feeding Goal:    Status:     Current Diet Order   Procedures    Diet NPO      AS: Analgesia/Sedation none   T: Thromboembolic Prophylaxis Yes     H: HOB > 300 Yes   U: Stress Ulcer Prophylaxis (if needed) yes   G: Glucose Control yes   B: Bowel Function     I: Indwelling Catheter (Lines & Yepez) Necessity PIV   D: De-escalation of Antimicrobials/Pharmacotherapies     Plan for the day/ETD Possible s/d    Code  Status:  Family/Goals of Care: Full Code              Jennifer Pitts MD  Critical Care Medicine  [facility]

## 2019-05-31 NOTE — PLAN OF CARE
Carlos A Caputo MD  [address] / [place] [address]    [facility] [address] [place] [address]  [address] AT [facility] & PRESS  [address]  [facility] [address]  Phone: [phone] Fax: [phone]    Payor: [employer] MANAGED MEDICARE / Plan: [employer] [facility] HEALTH / Product Type: Medicare Advantage /     Extended Emergency Contact Information  Primary Emergency Contact: Maikel Magallanes  Address: [address] [place] [address] [facility]  Home Phone: [phone]  Mobile Phone: [phone]  Relation: Mother  Secondary Emergency Contact: Kenan Brown   [facility]  Home Phone: [phone]  Mobile Phone: [phone]  Relation: Son    Future Appointments   Date Time Provider Department Center   5/31/2019  3:00 PM Carlos A Caputo MD [facility] IM [address] PCW   6/7/2019  8:00 AM Dorita Haywood NP [facility] WOUND [address]   6/7/2019  9:45 AM Claudia Ron DPM [facility] POD [address] [facility]   6/14/2019  8:00 AM Emeka Lester MD [facility] NEURO [address]   7/1/2019  9:00 AM Annette Hernandez NP [facility] ENDODIA [address] [facility]   7/2/2019  8:00 AM HOME MONITOR DEVICE CHECK, [facility] ARRHPRO [address] [facility]   7/5/2019  9:00 AM Carlos A Caputo MD [facility] IM [address] PCW   10/1/2019  8:00 AM HOME MONITOR DEVICE CHECK, [facility] ARRGERARDRO [address]       Patient states that she lives in a single story home (5 steps to entrance) with her significant other Osbaldo.      05/31/19 5035   Discharge Assessment   Assessment Type Discharge Planning Assessment   Confirmed/corrected address and phone number on facesheet? Yes   Assessment information obtained from? Patient;Caregiver   Expected Length of Stay (days) 2   Communicated expected length of stay with patient/caregiver yes   Prior to hospitilization cognitive status: Alert/Oriented   Prior to hospitalization functional status: Needs Assistance;Assistive Equipment   Current cognitive status: Alert/Oriented    Current Functional Status: Needs Assistance;Assistive Equipment   Facility Arrived From: ED via EMS   Lives With significant other  (Osbaldo Crowley (significant other) [phone])   Able to Return to Prior Arrangements other (see comments)  (TBD)   Is patient able to care for self after discharge? Unable to determine at this time (comments)   Who are your caregiver(s) and their phone number(s)? ElpidioMaikel (Mother) [phone],   Osbaldo Crowley (Significant other) [phone],  Kenan Brown (Son) [phone]     Patient's perception of discharge disposition home health;home or selfcare   Readmission Within the Last 30 Days previous discharge plan unsuccessful   If yes, [facility]    Patient currently being followed by outpatient case management? No   Patient currently receives any other outside agency services? Yes   Name and contact number of agency or person providing outside services [facility]    Is it the patient/care giver preference to resume care with the current outside agency? Yes   Equipment Currently Used at Home walker, rolling;wheelchair;oxygen   Do you have any problems affording any of your prescribed medications? No   Is the patient taking medications as prescribed? yes   Does the patient have transportation home? Yes   Transportation Anticipated family or friend will provide   Dialysis Name and Scheduled days [facility] (T,Th,S)    Discharge Plan A Home Health;Home with family   Discharge Plan B Skilled Nursing Facility   DME Needed Upon Discharge  shower chair   Patient/Family in Agreement with Plan yes   Readmission Questionnaire   At the time of your discharge, did someone talk to you about what your health problems were? Yes   At the time of discharge, did someone talk to you about what to watch out for regarding worsening of your health problem? Yes   At the time of discharge, did someone talk to you about what to do if you  experienced worsening of your health problem? Yes   At the time of discharge, did someone talk to you about which medication to take when you left the hospital and which ones to stop taking? Yes   At the time of discharge, did someone talk to you about when and where to follow up with a doctor after you left the hospital? Yes   What do you believe caused you to be sick enough to be re-admitted? Shortness of breath    Do you have problems taking your medications as prescribed? No   Do you have any problems affording any of  your prescribed medications? No   Do you have problems obtaining/receiving your medications? No   Does the patient have transportation to healthcare appointments? Yes   Living Arrangements house   Does the patient have family/friends to help with healtcare needs after discharge? yes       Vanessa Wharton RN, [place]  Case Management-Critical Care     [phone]

## 2019-05-31 NOTE — H&P
[facility] Hospital Medicine  History & Physical    Patient Name: Sisi Medel  MRN: [id number]  Admission Date: 5/30/2019  Attending Physician: Radha Lockett MD   Primary Care Provider: Carlos A Caputo MD    Hospital Medicine Team: [facility] HOSP MED 5 Maicol Altamirano MD     Patient information was obtained from patient, relative(s), past medical records and ER records.     Subjective:     Principal Problem:Acute and chronic respiratory failure with hypoxia    Chief Complaint:   Chief Complaint   Patient presents with    Shortness of Breath     SOB post dialysis today, initial sat 75% with labored respiration, EMS placed patient on CPAP sat now 95%        HPI: Ms. Medel is a 58 year old lady with a past medical history of pulmonary sarcoidosis on 3-5L home oxygen, Class 3 HFrEF (3/2019 EF 35%), perm Afib/flutter s/p 6/2017 AVN ablation & PPM, ESRD (TTS), Type 2 DM (3/2019 A1c 6.2%), pulmonary HTN, YOANDY, and orthostatic hypotension who presented to [facility] ED with complaints of shortness of breath. The patient went to dialysis today and apparently could not get down to her dry weight, per neprhology should be 111kg but patient only got to 112. Per the patient her BP's were fine and she did not have any trouble with the dialysis. Upon returning to the car the patient had worsening shortness of breath and had to increase O2 to 5L. Per the patient and her family these symptoms have been persistent for several weeks. She was discharged from [facility] yesterday (5/29/19) following a 2 day admission for similar complaints. At that time the patient needed dialysis for volume overload and new dry weight was established.    Of note patient has had frequent hospitalizations in the past. These admissions are usually for volume overload, CHF, and shortness of breath. She follows with pulmonology as an outpatient and is on chronic steroids. Per last outpatient note, continued on prednisone 20mg daily for  sarcoid as she has had difficulty and increased hospitalizations on lower doses.    In the ED the patient was on BiPAP, chest Xray showing significant pulmonary edema. Nephology was consulted and started dialysis in room. On evaluation patient was uncomfortable on bipap but no increased work of breathing noted. Patient endorsing neck pain from the bipap and leg pain. She denied recent fevers or chills, but did endorse a chronic cough that had been worsening recently.    Past Medical History:   Diagnosis Date    Anemia in ESRD (end-stage renal disease) 3/7/2016    Anticoagulant long-term use     Cervical radiculopathy 7/20/2015    CHF (congestive heart failure)     Chronic combined systolic and diastolic heart failure 6/14/2013    EF 20% 2013, improved with Medical therapy, negative PET 2013    Chronic respiratory failure with hypoxia 04/22/2013    On home oxygen at 2-5 liters    Closed fracture of proximal end of right fibula 6/27/2016    Complications due to renal dialysis device, implant, and graft     DDD (degenerative disc disease), lumbar 6/17/2015    Dependence on renal dialysis     Diabetes mellitus type II, controlled 12/8/2017    ESRD on hemodialysis 2/23/2016    Essential hypertension     Gout     Lateral meniscal tear 5/31/2016    Lumbar stenosis 6/17/2015    Obesity, Class III, BMI 40-49.9 (morbid obesity)     Pacemaker     Paroxysmal atrial fibrillation 5/16/2014    Not on anticoagulation    Peripheral neuropathy 11/13/2013    Personal history of gastric ulcer 3/19/2013    Pneumonia of left lower lobe due to infectious organism 3/10/2019    Sarcoidosis, lung 2009    Diagnosed in 2009 with ocular manifestation, on 4L home O2 and PO steroids    Secondary pulmonary hypertension 4/7/2015    Seizure disorder 3/19/2013    Shingles 11/13/2013    Thyroid disease        Past Surgical History:   Procedure Laterality Date    ABDOMINAL SURGERY      ABLATION N/A 6/12/2017    Performed by  [facility]orno [facility] at [facility] CATH LAB    ANGIOPLASTY Left 1/10/2018    Performed by Torrey [facility]afana MD at [facility] OR 2ND FLR    ANGIOPLASTY-PERCUTANEOUS TRANSLUMINAL (PTA) Left 2017    Performed by [facility] at [facility] OR 2ND FLR    ANGIOPLASTY-PERCUTANEOUS TRANSLUMINAL (PTA) Left 10/12/2016    Performed by [facility] at [facility] OR 2ND FLR    CARDIAC PACEMAKER PLACEMENT       SECTION      x2    DECLOT GRAFT PERCUTANEOUS Left 2017    Performed by [facility] at [facility] OR 2ND FLR    DECLOT-GRAFT Left 2016    Performed by [facility]jit Starr MD at [facility] CATH LAB    DECLOT-GRAFT Left 2016    Performed by [facility] at [facility] CATH LAB    ECHOCARDIOGRAM-TRANSESOPHAGEAL N/A 2013    Performed by Delmy Surgeon at [facility] DELMY    Fistulogram Left 2019    Performed by [facility] at [facility] CATH LAB    fistulogram Left 1/10/2018    Performed by [facility] at [facility] OR 2ND FLR    FISTULOGRAM Left 2017    Performed by [facility] CATH LAB    FISTULOGRAM Left 10/12/2016    Performed by [facility] at [facility] 2ND FLR    FISTULOGRAM Left 2016    Performed by [facility]it Starr MD at [facility] CATH LAB    Fistulogram, LUE AVG, possible intervention Left 2019    Performed by [facility] at [facility] CATH LAB    Fistulogram, OR 11 Left 2019    Performed by [facility] at [facility] OR 2ND FLR    INJECTION-STEROID-EPIDURAL-TRANSFORAMINAL Right 2015    Performed by [facility] at [facility] PAIN MGT    INJECTION-STEROID-EPIDURAL-TRANSFORAMINAL Right 2015    Performed by Pat[facility] at [facility] PAIN MGT    AAPIKZNQN-ZTQVL-[id number] / Left upper AV graft. Left 2/3/2016    Performed by [facility] at [facility] OR 2ND FLR    UUMILWFAW-NVBXELUEI-CYUWVPKBTNLLZ N/A 2017    Performed by Bl[facility]imir Adorno MD at [facility] CATH LAB    OTHER SURGICAL HISTORY      loop  recorder implant    PLACEMENT-STENT Left 2/7/2017    Performed by [facility] at [facility] OR 2ND FLR    PTA, AV FISTULA N/A 1/30/2019    Performed by [facility] at [facility] CATH LAB    stent to fistula      TRANSESOPHAGEAL ECHOCARDIOGRAM (JARAD) N/A 6/27/2016    Performed by Sourav Machuca MD at [facility] CATH LAB    ULTRASOUND, UPPER GI TRACT, ENDOSCOPIC N/A 1/9/2014    Performed by Stewart Burgess MD at [facility] ENDO (2ND FLR)    VASCULAR SURGERY      fistula to L upper arm        Review of patient's allergies indicates:   Allergen Reactions    Bactrim [sulfamethoxazole-trimethoprim] Other (See Comments)     Causes renal failure    Nsaids (non-steroidal anti-inflammatory drug) Other (See Comments)     Renal failure       No current facility-administered medications on file prior to encounter.      Current Outpatient Medications on File Prior to Encounter   Medication Sig    aspirin (ECOTRIN) 81 MG EC tablet Take 81 mg by mouth once daily.    atorvastatin (LIPITOR) 80 MG tablet TAKE 1 TABLET BY MOUTH EVERY EVENING    benzonatate (TESSALON) 100 MG capsule Take 1 capsule (100 mg total) by mouth 3 (three) times daily. for 10 days    blood sugar diagnostic Strp 1 each by Misc.(Non-Drug; Combo Route) route once daily. (Patient taking differently: 1 each by Misc.(Non-Drug; Combo Route) route 2 (two) times daily. )    cinacalcet (SENSIPAR) 30 MG Tab Take 30 mg by mouth daily with breakfast.    clopidogrel (PLAVIX) 75 mg tablet TAKE 1 TABLET(75 MG) BY MOUTH EVERY DAY    fludrocortisone (FLORINEF) 0.1 mg Tab Take 1 tablet (100 mcg total) by mouth 2 (two) times daily.    gabapentin (NEURONTIN) 600 MG tablet Take 600 mg by mouth 2 (two) times daily.     lancets Misc 1 each by Misc.(Non-Drug; Combo Route) route once daily. (Patient taking differently: 1 each by Misc.(Non-Drug; Combo Route) route 2 (two) times daily. )    levETIRAcetam (KEPPRA) 500 MG Tab TAKE 1 TABLET BY MOUTH TWICE DAILY     midodrine (PROAMATINE) 10 MG tablet Take 1 tablet (10 mg total) by mouth 3 (three) times daily.    nortriptyline (PAMELOR) 25 MG capsule Take 1 capsule by mouth nightly    omeprazole (PRILOSEC) 20 MG capsule TAKE 1 CAPSULE BY MOUTH EVERY DAY    patiromer calcium sorbitex (VELTASSA) 16.8 gram PwPk Take 33.6 g by mouth once daily.     prednisoLONE acetate (PRED FORTE) 1 % DrpS INSTILL ONE DROP INTO  LEFT EYE THREE TIMES A DAY    predniSONE (DELTASONE) 20 MG tablet Take 0.5 tablets (10 mg total) by mouth once daily.    pregabalin (LYRICA) 100 MG capsule Take 1 capsule (100 mg total) by mouth 3 (three) times daily.    PROLENSA 0.07 % Drop INSTILL 1 DROP IN LEFT / RIGHT EYE QD    DENISE-LINDA 0.8 mg Tab TAKE 1 TABLET BY MOUTH ONCE DAILY    RENVELA 800 mg Tab TAKE 1 TABLET BY MOUTH THREE TIMES DAILY WITH MEALS    sodium polystyrene (KAYEXALATE) powder TK 30 GRAMS PO D    tiZANidine (ZANAFLEX) 4 MG tablet TAKE 1 TABLET BY MOUTH EVERY DAY AS NEEDED FOR MUSCLE SPASMS    TRADJENTA 5 mg Tab tablet TAKE 1 TABLET(5 MG) BY MOUTH EVERY DAY    warfarin (COUMADIN) 5 MG tablet TAKE 1 TABLET BY MOUTH EVERY DAY EXCEPT TH 1AND /2 TABLETS ON MONDAY AND FRIDAY OR AS DIRECTED (Patient taking differently: TAKE 1 TABLET BY MOUTH EVERY DAY OR AS DIRECTED)     Family History     Problem Relation (Age of Onset)    Coronary artery disease Father    Diabetes Mother, Father, Maternal Grandmother    Hypertension Mother, Father    Kidney failure Mother    Lupus Sister        Tobacco Use    Smoking status: Former Smoker     Packs/day: 0.50     Years: 10.00     Pack years: 5.00     Types: Cigarettes     Last attempt to quit: 1994     Years since quittin.1    Smokeless tobacco: Never Used   Substance and Sexual Activity    Alcohol use: No     Alcohol/week: 0.0 oz     Comment: rarely    Drug use: No    Sexual activity: Not Currently     Review of Systems   Constitutional: Positive for fatigue. Negative for chills and fever.    HENT: Negative for rhinorrhea, sneezing and sore throat.    Eyes: Negative for visual disturbance.   Respiratory: Positive for cough (nonproductive) and shortness of breath. Negative for chest tightness and wheezing.    Cardiovascular: Negative for chest pain, palpitations and leg swelling.   Gastrointestinal: Negative for abdominal pain, constipation, diarrhea, nausea and vomiting.   Genitourinary: Negative for dysuria, flank pain and hematuria.   Musculoskeletal: Positive for myalgias and neck pain. Negative for arthralgias, back pain and neck stiffness.   Skin: Negative for color change and rash.   Neurological: Positive for weakness. Negative for dizziness, light-headedness and headaches.   Psychiatric/Behavioral: Negative for agitation, behavioral problems, confusion and decreased concentration.     Objective:     Vital Signs (Most Recent):  Temp: 99.6 °F (37.6 °C) (05/30/19 2001)  Pulse: 69 (05/30/19 2032)  Resp: (!) 26 (05/30/19 2032)  BP: (!) 111/57 (05/30/19 2032)  SpO2: 97 % (05/30/19 2032) Vital Signs (24h Range):  Temp:  [98 °F (36.7 °C)-99.6 °F (37.6 °C)] 99.6 °F (37.6 °C)  Pulse:  [59-75] 69  Resp:  [19-41] 26  SpO2:  [87 %-100 %] 97 %  BP: ()/() 111/57     Weight: 112 kg (246 lb 14.6 oz)  Body mass index is 37.11 kg/m².    Physical Exam   Constitutional: She appears well-developed and well-nourished. She appears distressed.   Ill appearing, on BiPAP   HENT:   Head: Normocephalic and atraumatic.   Eyes: Pupils are equal, round, and reactive to light. EOM are normal. No scleral icterus.   Neck: Normal range of motion. Neck supple.   Cardiovascular: Regular rhythm. Tachycardia present. Exam reveals no friction rub.   No murmur heard.  Pulmonary/Chest: Effort normal. No stridor. No respiratory distress. She has rales (bialteral lung bases).   Uncomfortable on bipap   Abdominal: Soft. Bowel sounds are normal. She exhibits no distension. There is no tenderness.   Musculoskeletal: She exhibits  no edema or deformity.   Nursing note and vitals reviewed.        CRANIAL NERVES     CN III, IV, VI   Pupils are equal, round, and reactive to light.  Extraocular motions are normal.        Significant Labs:   CBC:   Recent Labs   Lab 05/29/19  0445 05/30/19  1313   WBC 6.22 8.63   HGB 12.5 12.9   HCT 41.0 42.4   * 143*     CMP:   Recent Labs   Lab 05/29/19  0445 05/30/19  1313    132*   K 4.0 3.8   CL 98 87*   CO2 28 30*    90   BUN 25* 10   CREATININE 5.4* 2.9*   CALCIUM 8.1* 8.4*   PROT 7.4 8.0   ALBUMIN 2.8* 2.9*   BILITOT 0.7 1.6*   ALKPHOS 108 109   * 94*   * 108*   ANIONGAP 12 15   EGFRNONAA 8.2* 17.3*     Lactic Acid:   Recent Labs   Lab 05/30/19  1833   LACTATE 7.5*     Troponin:   Recent Labs   Lab 05/30/19  1313   TROPONINI 0.287*     All pertinent labs within the past 24 hours have been reviewed.    Significant Imaging: I have reviewed all pertinent imaging results/findings within the past 24 hours.     Rohith Lei MD 5/30/2019       Narrative     EXAMINATION:  XR CHEST AP PORTABLE    CLINICAL HISTORY:  CHF;    TECHNIQUE:  Single frontal view of the chest was performed.    COMPARISON:  No 05/27/2019 ne    FINDINGS:  Pacemaker remain.  Cardiomegaly, pulmonary vascular congestion and edema.  No significant changes.      Impression       See above      Electronically signed by: Rohith Lei MD  Date: 05/30/2019  Time: 13:35         Assessment/Plan:     * Acute and chronic respiratory failure with hypoxia  Patient admitted for shortness of breath, CXR showing significant pulmonary edema. Nephrology consulted in ED for volume removal, patient unable to get to dry weight today at outpatient dialysis. Recently discharged on 5/29/19. Patient also with history of sarcoid which per outpatient pulmonary notes is rather advanced, requiring daily chronic steroids. Likely has low reserve so acute hypoxia likely 2/2 volume overload.    - Appreciate nephrology's help, dialysis per  neprhology  - Will try to transition patient off BiPAP following completion of dialysis  - Continuing prednisone 20mg daily    Update: Patient became acutely obtunded following completion of dialysis. Found to have BG in the 20's given 2 amps of D50 and patient's mental status improved. However now with significant work of breathing. ICU consulted and patient to be admitted to ICU service.        VTE Risk Mitigation (From admission, onward)        Ordered     heparin (porcine) injection 5,000 Units  Every 8 hours      05/30/19 1803     IP VTE HIGH RISK PATIENT  Once      05/30/19 1803             Maicol Altamirano MD  Department of Hospital Medicine   [facility]

## 2019-05-31 NOTE — HPI
58F with a PMH of ESRD on HD, pulmonary sarcoid on 4L home O2, HFrEF 35% (3/2019), afib/flutter on coumadin s/p 6/2017 AVN ablation with PPM, DM2 (not on insulin), pHTN, YOANDY presents with shortness of breath. Son provides most of the history as patient is awake but non-verbal.  She was discharged yesterday and has been experiencing SOB since then, secondary to volume overload. She received HD this morning but it was incomplete, and she experienced worsening SOB while there. According to EMS, they were called by dialysis's staff because the patient became severely short of breath as she was walking to her car. She reported cough and fatigue. She endorses feeling sick with a wet nonproductive cough and abdominal pain. Denies fever, chills. She is anuric.     VBG in the ED revealed 7/47/52. She was placed on BiPAP in the ED and dialysis was performed with 2.75L removed and minimal improvement in respiratory symptoms. While in the ED awaiting a bed with hospital medicine, she became obtunded and was found to be hypoglycemic to 29. Mental status improved with dextrose.    She was recently admitted 5/27 - 5/29 to observation for respiratory disress which improved with HD.   Patient has been hospitalized multiple times for SOB and acute decompensated heart failure.

## 2019-05-31 NOTE — ASSESSMENT & PLAN NOTE
Outpatient HD unit: [facility]   -Nephrologist: Patient does not know  -HD tx days: TThS  -HD tx time: 4hrs 45mins    -HD access: LUE AVF  -HD modality: iHD  -Residual urine: Minimal  -EDW: 111.5 kg    Plan/Recommendations:  -will hold an additional UF/HD sessions today, will plan for dialysis tomorrow AM, orders placed  -will continue challenging her EDW for volume optimization  -hgb 12.8, within range, no Epogen   -consider renal diet with 1 L fluid restriction when appropriate   -restart phos binders  -recommend serial renal function panels for phos and albumin assessments

## 2019-05-31 NOTE — SUBJECTIVE & OBJECTIVE
Past Medical History:   Diagnosis Date    Anemia in ESRD (end-stage renal disease) 3/7/2016    Anticoagulant long-term use     Cervical radiculopathy 2015    CHF (congestive heart failure)     Chronic combined systolic and diastolic heart failure 2013    EF 20% , improved with Medical therapy, negative PET     Chronic respiratory failure with hypoxia 2013    On home oxygen at 2-5 liters    Closed fracture of proximal end of right fibula 2016    Complications due to renal dialysis device, implant, and graft     DDD (degenerative disc disease), lumbar 2015    Dependence on renal dialysis     Diabetes mellitus type II, controlled 2017    ESRD on hemodialysis 2016    Essential hypertension     Gout     Lateral meniscal tear 2016    Lumbar stenosis 2015    Obesity, Class III, BMI 40-49.9 (morbid obesity)     Pacemaker     Paroxysmal atrial fibrillation 2014    Not on anticoagulation    Peripheral neuropathy 2013    Personal history of gastric ulcer 3/19/2013    Pneumonia of left lower lobe due to infectious organism 3/10/2019    Sarcoidosis, lung 2009    Diagnosed in  with ocular manifestation, on 4L home O2 and PO steroids    Secondary pulmonary hypertension 2015    Seizure disorder 3/19/2013    Shingles 2013    Thyroid disease        Past Surgical History:   Procedure Laterality Date    ABDOMINAL SURGERY      ABLATION N/A 2017    Performed by Bladimir Adorno MD at [facility] CATH LAB    ANGIOPLASTY Left 1/10/2018    Performed by Torrey Villafana MD at [facility] OR 2ND FLR    ANGIOPLASTY-PERCUTANEOUS TRANSLUMINAL (PTA) Left 2017    Performed by Torrey Villafana MD at [facility] OR 2ND FLR    ANGIOPLASTY-PERCUTANEOUS TRANSLUMINAL (PTA) Left 10/12/2016    Performed by Torrey Villafana MD at [facility] OR 2ND FLR    CARDIAC PACEMAKER PLACEMENT       SECTION      x2    DECLOT GRAFT PERCUTANEOUS Left  2/7/2017    Performed by [facility] Vilalfana MD at [facility] OR 2ND FLR    DECLOT-GRAFT Left 6/21/2016    Performed by Harjit Starr MD at [facility] CATH LAB    DECLOT-GRAFT Left 6/20/2016    Performed by Harjti Starr MD at [facility] CATH LAB    ECHOCARDIOGRAM-TRANSESOPHAGEAL N/A 6/27/2013    Performed by Delmy Surgeon at [facility] DELMY    Fistulogram Left 4/8/2019    Performed by Torrey Vlilafana MD at [facility] CATH LAB    fistulogram Left 1/10/2018    Performed by Torrey Villafnaa MD at [facility] OR 2ND FLR    FISTULOGRAM Left 9/13/2017    Performed by Torrey Villafana MD at [facility] CATH LAB    FISTULOGRAM Left 10/12/2016    Performed by Torrey Villafnaa MD at [facility] OR 2ND FLR    FISTULOGRAM Left 6/21/2016    Performed by Harjit Starr MD at [facility] CATH LAB    Fistulogram, LUE AVG, possible intervention Left 1/30/2019    Performed by Torrey Villafana MD at [facility] CATH LAB    Fistulogram, OR 11 Left 5/8/2019    Performed by Torrey Villafana MD at [facility] OR 2ND FLR    INJECTION-STEROID-EPIDURAL-TRANSFORAMINAL Right 7/20/2015    Performed by Patria Gutierrez MD at [facility] [place] PAIN MGT    INJECTION-STEROID-EPIDURAL-TRANSFORAMINAL Right 5/21/2015    Performed by Patria Gutierrez MD at [facility] [place] PAIN MGT    [id number] / Left upper AV graft. Left 2/3/2016    Performed by Torrey Villafana MD at [facility] OR 2ND FLR    [id number]-[id number] N/A 1/11/2017    Performed by Bladimir Adorno MD at [facility] CATH LAB    OTHER SURGICAL HISTORY      loop recorder implant    PLACEMENT-STENT Left 2/7/2017    Performed by Torrey Villafana MD at [facility] OR 2ND FLR    PTA, AV FISTULA N/A 1/30/2019    Performed by Torrey Villafana MD at [facility] CATH LAB    stent to fistula      TRANSESOPHAGEAL ECHOCARDIOGRAM (JARAD) N/A 6/27/2016    Performed by Sourav Machuca MD at [facility] CATH LAB    ULTRASOUND, UPPER GI TRACT, ENDOSCOPIC N/A 1/9/2014    Performed by Stewart Burgess MD at [facility] ENDO (2ND FLR)    VASCULAR SURGERY       fistula to L upper arm        Review of patient's allergies indicates:   Allergen Reactions    Bactrim [sulfamethoxazole-trimethoprim] Other (See Comments)     Causes renal failure    Nsaids (non-steroidal anti-inflammatory drug) Other (See Comments)     Renal failure       Family History     Problem Relation (Age of Onset)    Coronary artery disease Father    Diabetes Mother, Father, Maternal Grandmother    Hypertension Mother, Father    Kidney failure Mother    Lupus Sister        Tobacco Use    Smoking status: Former Smoker     Packs/day: 0.50     Years: 10.00     Pack years: 5.00     Types: Cigarettes     Last attempt to quit: 1994     Years since quittin.1    Smokeless tobacco: Never Used   Substance and Sexual Activity    Alcohol use: No     Alcohol/week: 0.0 oz     Comment: rarely    Drug use: No    Sexual activity: Not Currently      Review of Systems   Unable to perform ROS: Patient nonverbal     Objective:     Vital Signs (Most Recent):  Temp: 98 °F (36.7 °C) (19)  Pulse: 69 (19)  Resp: (!) 32 (19)  BP: 130/63 (19)  SpO2: (!) 93 % (19) Vital Signs (24h Range):  Temp:  [98 °F (36.7 °C)] 98 °F (36.7 °C)  Pulse:  [59-75] 69  Resp:  [19-41] 32  SpO2:  [87 %-95 %] 93 %  BP: ()/() 130/63   Weight: 112 kg (246 lb 14.6 oz)  Body mass index is 37.11 kg/m².      Intake/Output Summary (Last 24 hours) at 2019 192  Last data filed at 2019 1808  Gross per 24 hour   Intake 250 ml   Output 2750 ml   Net -2500 ml       Physical Exam   Constitutional: She appears well-developed and well-nourished. No distress.   Obese, non-verbal   HENT:   Head: Normocephalic and atraumatic.   Nose: Nose normal.   Eyes: EOM are normal. No scleral icterus.   Pupils are dilated bilaterally   Neck: Normal range of motion. No tracheal deviation present.   Cardiovascular: Normal rate and regular rhythm. Exam reveals no gallop and no friction rub.    No murmur heard.  Pulmonary/Chest: Effort normal.   On bipap.  Lung sounds obscured by bipap sounds.   Abdominal: Soft. Bowel sounds are normal. There is no tenderness.   Abdominal adiposity.  Tenderness to palpation in the LUQ, LLQ, RLQ.   Musculoskeletal: She exhibits no edema.   Chronic wounds in the bilateral lower extremities R>L that are healing well.     Neurological: She is alert.   Skin: Skin is warm and dry.       Vents:  Oxygen Concentration (%): 35 (05/30/19 1308)  Lines/Drains/Airways     Drain                 Hemodialysis AV Graft Left upper arm -- days         Hemodialysis AV Graft Left upper arm -- days          Peripheral Intravenous Line                 Peripheral IV - Single Lumen 05/09/19 1917 24 G Right Hand 21 days         Peripheral IV - Single Lumen 05/30/19 1328 20 G Right Antecubital less than 1 day              Significant Labs:    CBC/Anemia Profile:  Recent Labs   Lab 05/29/19  0445 05/30/19  1313   WBC 6.22 8.63   HGB 12.5 12.9   HCT 41.0 42.4   * 143*   MCV 86 85   RDW 18.4* 18.4*        Chemistries:  Recent Labs   Lab 05/29/19  0445 05/30/19  1313    132*   K 4.0 3.8   CL 98 87*   CO2 28 30*   BUN 25* 10   CREATININE 5.4* 2.9*   CALCIUM 8.1* 8.4*   ALBUMIN 2.8* 2.9*   PROT 7.4 8.0   BILITOT 0.7 1.6*   ALKPHOS 108 109   * 108*   * 94*   MG 2.1  --    PHOS 3.7  --        ABGs:   Recent Labs   Lab 05/30/19  1835   PH 7.311*   PCO2 46.9*   HCO3 23.7*   POCSATURATED 92*   BE -3     Lactic Acid: No results for input(s): LACTATE in the last 48 hours.    Significant Imaging: I have reviewed all pertinent imaging results/findings within the past 24 hours.

## 2019-05-31 NOTE — ASSESSMENT & PLAN NOTE
--became obtunded in the ED likely secondary to combination of hypoglycemia and respiratory failure and/or polypharmacy  --NPO and hold home TCA, lyrica, zanaflex  --now following most commands  --see epilepsy

## 2019-05-31 NOTE — ASSESSMENT & PLAN NOTE
--when able to swallow, continue home midodrine, prednisone, fludricortisone 100 mcg BID  --if hypoglycemia and hypotension recur, consider stress dose steroids; however overnight blood pressure and glucose has been stable

## 2019-05-31 NOTE — PROGRESS NOTES
Pharmacokinetic Assessment Follow Up: IV Vancomycin    Vancomycin Regimen Assessment & Plan:  - Vancomycin level on 5/31 @0330 resulted as 51.5 mcg/mL. Appears to have been collected almost immediately after vancomycin infusion was completed.  - Patient ESRD on outpatient HD TThS. Nephrology consulted, but will still withhold additional vancomycin doses today regardless of RRT plan due to supratherapeutic vancomycin level.  - Vancomycin level ordered to be collected with AM labs tomorrow. Will plan on pulse dosing depending on level and RRT plans.    Pharmacy will continue to follow and monitor vancomycin.    Please contact pharmacy at extension [phone] for questions regarding this assessment.    Thank you for the consult,   Tavon Humphrey, PharmD, [facility]  Medical/Surgical ICU Clinical Pharmacist  Spectralink: [phone]     Patient brief summary:  Sisi Medel is a 58 y.o. female initiated on antimicrobial therapy with IV Vancomycin for treatment of suspected lower respiratory infection    Drug Allergies:   Review of patient's allergies indicates:   Allergen Reactions    Bactrim [sulfamethoxazole-trimethoprim] Other (See Comments)     Causes renal failure    Nsaids (non-steroidal anti-inflammatory drug) Other (See Comments)     Renal failure       Actual Body Weight:   108.2 kg    Renal Function:   Estimated Creatinine Clearance: 18.6 mL/min (A) (based on SCr of 4.3 mg/dL (H)).,     Dialysis Method (if applicable):  intermittent HD    CBC (last 72 hours):  Recent Labs   Lab Result Units 05/29/19  0445 05/30/19  1313 05/30/19  1833 05/31/19  0330   WBC K/uL 6.22 8.63  --  12.00   Hemoglobin g/dL 12.5 12.9  --  12.8   Hemoglobin A1C %  --   --  6.5*  --    Hematocrit % 41.0 42.4  --  41.2   Platelets K/uL 129* 143*  --  120*   Gran% % 68.0 78.8*  --  85.9*   Lymph% % 13.7* 11.2*  --  4.2*   Mono% % 16.7* 8.7  --  8.7   Eosinophil% % 1.0 0.5  --  0.0   Basophil% % 0.3 0.1  --  0.2   Differential Method  Automated  Automated  --  Automated       Metabolic Panel (last 72 hours):  Recent Labs   Lab Result Units 05/29/19  0445 05/30/19  1313 05/31/19  0330   Sodium mmol/L 138 132* 134*   Potassium mmol/L 4.0 3.8 4.5   Chloride mmol/L 98 87* 91*   CO2 mmol/L 28 30* 26   Glucose mg/dL 108 90 144*   BUN, Bld mg/dL 25* 10 19   Creatinine mg/dL 5.4* 2.9* 4.3*   Albumin g/dL 2.8* 2.9* 2.6*   Total Bilirubin mg/dL 0.7 1.6* 1.3*   Alkaline Phosphatase U/L 108 109 105   AST U/L 141* 94* 187*   ALT U/L 148* 108* 142*   Magnesium mg/dL 2.1  --   --    Phosphorus mg/dL 3.7  --   --        Vancomycin Administrations:  vancomycin given in the last 96 hours                   vancomycin 1750 mg in 0.9% sodium chloride 500 mL IVPB (mg) 1,750 mg New Bag 05/31/19 0108                Drug levels (last 3 results):  Recent Labs   Lab Result Units 05/31/19  0330   Vancomycin, Random ug/mL 51.5       Microbiologic Results:  Microbiology Results (last 7 days)     Procedure Component Value Units Date/Time    Blood culture [[id number]] Collected:  05/30/19 1834    Order Status:  Completed Specimen:  Blood from Peripheral, Antecubital, Right Updated:  05/31/19 0603     Blood Culture, Routine Gram stain nazario bottle: Gram positive cocci      Blood Culture, Routine Results called to and read back by:Kayla Quesada RN 05/31/2019 06:03    Blood culture [[id number]] Collected:  05/30/19 1833    Order Status:  Completed Specimen:  Blood from AV Graft, Left Updated:  05/31/19 0315     Blood Culture, Routine No Growth to date

## 2019-05-31 NOTE — ASSESSMENT & PLAN NOTE
--BNP on admit 812 in setting of obesity  --Echo (3/11/2019) - EF 35%, decreased LV systolic and diastolic dysfunction  --CXR consistent with pulmonary vascular congestion and edema  --HD for volume removal

## 2019-05-31 NOTE — CONSULTS
""Wound care consulted for wounds bilateral lower extremities.  There is no LDA for any wounds, discussed with Unit Nurse Dane.  Dane's assessment has dry skin to the bilateral lower legs, no open wounds and pink scar tissue to the right foot- currently has a foam dressing over area.    Recommendations  Moisturize skin with Sween 24 lotion daily after bath  Foam dressing to right foot as preventive measure.   Wound care signing off.   LACIE Sanchez RN, CWCN  [phone]  "

## 2019-05-31 NOTE — ASSESSMENT & PLAN NOTE
--continue home Keppra (IV for now)  --check keppra level  --if no improvement in mental status, consider EEG or neuro consult

## 2019-05-31 NOTE — ASSESSMENT & PLAN NOTE
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

## 2019-05-31 NOTE — PROGRESS NOTES
"[facility]  Nephrology  Progress Note    Patient Name: Sisi Medel  MRN: [id number]  Admission Date: 5/30/2019  Hospital Length of Stay: 1 days  Attending Provider: Bang Cordero MD   Primary Care Physician: Carlos A Caputo MD  Principal Problem:Acute on chronic respiratory failure with hypoxia and hypercapnia    Subjective:     HPI: Ms. Sisi Medel is a 59 y/o  female with PMHx ESRD on HD, autonomic dysfunction, adrenal insufficiency, chronic systolic and diastolic heart failure (EF 35%),  pulmonary HTN, PAF, sarcoidosis, and recent admission for volume overload who presents to the hospital via EMS from her OP dialysis unit in respiratory distress.  She was recently discharged 2 days ago where during that admission her EDW was challenged and she left @ 111 kg (Previous EDW of 115 kg).  She reported to her OP dialysis unit this morning for her regular treatment with a pre-weight of 112.8 kg and according to OP nurse and patient, around 0.3-0.5 kg were only removed, unclear of why, but patient denies any cramping or hypotensive episodes during the treatment.  She reports feeling "ok" prior to HD, remained with some SOB on exertion but while she was walking to her care after treatment, she became acutely SOB and EMS was called by the dialysis staff and she was transferred to [facility] for further evaluation.  In the ED, she was placed on BiPAP with improvement in oxygeantion, CXR revealing bilateral pulmonary edema and BNP elevated above her baseline @ 812.  Chemistry without significant electrolyte abnormalities.  She denies any CP, fever, chills, N/V/D.  She endorses non-productive cough.    Interval History: Improvement after HD treatment yesterday, UF 2.5 L. Patient now on 4 L via NC. Electrolytes and acid base stable. No complaints of N/V or abdominal pain.     Review of patient's allergies indicates:   Allergen Reactions    Bactrim " [sulfamethoxazole-trimethoprim] Other (See Comments)     Causes renal failure    Nsaids (non-steroidal anti-inflammatory drug) Other (See Comments)     Renal failure     Current Facility-Administered Medications   Medication Frequency    acetaminophen tablet 650 mg Q4H PRN    albuterol-ipratropium 2.5 mg-0.5 mg/3 mL nebulizer solution 3 mL Q4H PRN    aspirin EC tablet 81 mg Daily    cinacalcet tablet 30 mg Daily with breakfast    clopidogrel tablet 75 mg Daily    dextrose 50% injection 12.5 g PRN    dextrose 50% injection 25 g PRN    fludrocortisone tablet 100 mcg BID    glucagon (human recombinant) injection 1 mg PRN    glucose chewable tablet 16 g PRN    glucose chewable tablet 24 g PRN    insulin aspart U-100 pen 0-5 Units QID (AC + HS) PRN    levETIRAcetam in NaCl (iso-os) IVPB 500 mg Q12H    methylPREDNISolone sodium succinate injection 40 mg BID    midodrine tablet 10 mg TID    pantoprazole EC tablet 40 mg Daily    piperacillin-tazobactam 4.5 g in sodium chloride 0.9% 100 mL IVPB (ready to mix system) Q12H    prednisoLONE acetate 1 % ophthalmic suspension 1 drop TID    sevelamer carbonate tablet 800 mg TID WM    sodium chloride 0.9% flush 10 mL PRN    vitamin renal formula (B-complex-vitamin c-folic acid) 1 mg per capsule 1 capsule Daily    warfarin (COUMADIN) tablet 1 mg Once       Objective:     Vital Signs (Most Recent):  Temp: 98 °F (36.7 °C) (05/31/19 0300)  Pulse: 69 (05/31/19 1400)  Resp: (!) 39 (05/31/19 1400)  BP: 96/71 (05/31/19 1400)  SpO2: 100 % (05/31/19 1400)  O2 Device (Oxygen Therapy): nasal cannula (05/31/19 1400) Vital Signs (24h Range):  Temp:  [98 °F (36.7 °C)-99.6 °F (37.6 °C)] 98 °F (36.7 °C)  Pulse:  [60-75] 69  Resp:  [19-57] 39  SpO2:  [85 %-100 %] 100 %  BP: ()/() 96/71     Weight: 108.2 kg (238 lb 8.6 oz) (05/31/19 0030)  Body mass index is 36.27 kg/m².  Body surface area is 2.28 meters squared.    I/O last 3 completed shifts:  In: 250  [Other:250]  Out: 2750 [Other:2750]    Physical Exam   Constitutional: She is oriented to person, place, and time. She appears well-developed. She is cooperative. She appears ill. No distress. Nasal cannula in place.   HENT:   Head: Atraumatic.   Right Ear: External ear normal.   Left Ear: External ear normal.   Susan-orbital edema   Eyes: Conjunctivae and EOM are normal. Right eye exhibits no discharge. Left eye exhibits no discharge.   Neck: Normal range of motion. Neck supple.   Cardiovascular: Regular rhythm. Exam reveals no gallop and no friction rub.   Murmur heard.  Pulmonary/Chest: No accessory muscle usage. No tachypnea. No respiratory distress. She has decreased breath sounds. She has rales.   Abdominal: Soft. Bowel sounds are normal. She exhibits no distension. There is no tenderness.   Musculoskeletal: She exhibits no edema or deformity.   Neurological: She is alert and oriented to person, place, and time.   Skin: Skin is warm and dry. She is not diaphoretic.   RAISSA AVG       Significant Labs:  CBC:   Recent Labs   Lab 05/31/19  0330   WBC 12.00   RBC 4.93   HGB 12.8   HCT 41.2   *   MCV 84   MCH 26.0*   MCHC 31.1*     CMP:   Recent Labs   Lab 05/31/19  0330   *   CALCIUM 7.8*   ALBUMIN 2.6*   PROT 7.6   *   K 4.5   CO2 26   CL 91*   BUN 19   CREATININE 4.3*   ALKPHOS 105   *   *   BILITOT 1.3*            Assessment/Plan:     End stage renal failure on dialysis  Outpatient HD unit: [facility]   -Nephrologist: Patient does not know  -HD tx days: TThS  -HD tx time: 4hrs 45mins    -HD access: LUE AVF  -HD modality: iHD  -Residual urine: Minimal  -EDW: 111.5 kg    Plan/Recommendations:  -will hold an additional UF/HD sessions today, will plan for dialysis tomorrow AM, orders placed  -will continue challenging her EDW for volume optimization  -hgb 12.8, within range, no Epogen   -consider renal diet with 1 L fluid restriction when appropriate   -restart phos  binders  -recommend serial renal function panels for phos and albumin assessments          Thank you for your consult. I will follow-up with patient. Please contact us if you have any additional questions.    Layne James NP  Nephrology  [facility]

## 2019-05-31 NOTE — PROGRESS NOTES
Pharmacokinetic Initial Assessment: IV Vancomycin    Assessment/Plan:    Initiate intravenous vancomycin with loading dose of 1750 mg once with subsequent doses when random concentrations are less than 25 mcg/ml  Desired empiric serum trough concentration is 10 to 20 mcg/mL.  Draw vancomycin random level on 05/31 at 0400.  Pharmacy will continue to follow and monitor vancomycin.      Please contact pharmacy at extension [phone] with any questions regarding this assessment.     Thank you for the consult,   Nigel Patten     Patient brief summary:  Sisi Medel is a 58 y.o. female initiated on antimicrobial therapy with IV Vancomycin for treatment of suspected lower respiratory infection    Drug Allergies:   Review of patient's allergies indicates:   Allergen Reactions    Bactrim [sulfamethoxazole-trimethoprim] Other (See Comments)     Causes renal failure    Nsaids (non-steroidal anti-inflammatory drug) Other (See Comments)     Renal failure       Actual Body Weight:   112 kg    Renal Function:   CrCl cannot be calculated (Unknown ideal weight.).,     Dialysis Method (if applicable):  intermittent HD    CBC (last 72 hours):  Recent Labs   Lab Result Units 05/28/19  0515 05/29/19 0445 05/30/19  1313 05/30/19  1833   WBC K/uL 4.58 6.22 8.63  --    Hemoglobin g/dL 12.2 12.5 12.9  --    Hemoglobin A1C %  --   --   --  6.5*   Hematocrit % 40.7 41.0 42.4  --    Platelets K/uL 123* 129* 143*  --    Gran% % 71.6 68.0 78.8*  --    Lymph% % 12.7* 13.7* 11.2*  --    Mono% % 14.4 16.7* 8.7  --    Eosinophil% % 0.9 1.0 0.5  --    Basophil% % 0.2 0.3 0.1  --    Differential Method  Automated Automated Automated  --        Metabolic Panel (last 72 hours):  Recent Labs   Lab Result Units 05/27/19  2044 05/28/19  0515 05/29/19  0445 05/30/19  1313   Sodium mmol/L 139 137 138 132*   Potassium mmol/L 5.9* 5.0 4.0 3.8   Chloride mmol/L 100 98 98 87*   CO2 mmol/L 22* 23 28 30*   Glucose mg/dL 149* 86 108 90   BUN, Bld mg/dL  24* 30* 25* 10   Creatinine mg/dL 5.5* 6.6* 5.4* 2.9*   Albumin g/dL 2.8* 2.7* 2.8* 2.9*   Total Bilirubin mg/dL 0.9 0.9 0.7 1.6*   Alkaline Phosphatase U/L 109 103 108 109   AST U/L 234* 193* 141* 94*   ALT U/L 191* 172* 148* 108*   Magnesium mg/dL  --  2.2 2.1  --    Phosphorus mg/dL  --  6.4* 3.7  --        Drug levels (last 3 results):  No results for input(s): VANCOMYCINRA, VANCOMYCINPE, VANCOMYCINTR in the last 72 hours.    Microbiologic Results:  Microbiology Results (last 7 days)     Procedure Component Value Units Date/Time    Blood culture [[id number]] Collected:  05/30/19 1834    Order Status:  Sent Specimen:  Blood from Peripheral, Antecubital, Right Updated:  05/30/19 1854    Blood culture [[id number]] Collected:  05/30/19 1833    Order Status:  Sent Specimen:  Blood from AV Graft, Left Updated:  05/30/19 1854

## 2019-05-31 NOTE — ASSESSMENT & PLAN NOTE
--became obtunded in the ED likely secondary to combination of hypoglycemia and respiratory failure and/or polypharmacy  --NPO and hold home TCA, lyrica, zanaflex  --see epilepsy

## 2019-05-31 NOTE — PROGRESS NOTES
sequential treatment complete. 2.5 liter net fluid removal.blood returned without difficulty. Post tx pt became obtunded, ED RN/MD'S alerted, pt hypoglycemic, increased responses after D50 given via dialysis access, labs drawn via access, needles removed and pressure held x 10 minutes until hemostasis achieved. Gauze pressure dressing applied and secured with tape. Positive bruit/thrill noted. ICU consulted.

## 2019-06-01 PROBLEM — G93.40 ENCEPHALOPATHY: Status: RESOLVED | Noted: 2019-01-01 | Resolved: 2019-01-01

## 2019-06-01 PROBLEM — R78.81 BACTEREMIA: Status: ACTIVE | Noted: 2019-01-01

## 2019-06-01 NOTE — PLAN OF CARE
Problem: Adult Inpatient Plan of Care  Goal: Plan of Care Review  Outcome: Ongoing (interventions implemented as appropriate)  No acute events throughout day. See vital signs and assessments in flowsheets. See below for updates on today's progress.      Pulmonary: On CPAP at night; 4L via NC w/ humidification during the day.       Cardiovascular: HR paced at 68-70 -BP at 100-120s/60-80s     Neurological: WNL; AAOx4.      Gastrointestinal: Pt NPO     Genitourinary: Anuric; HD scheduled for Saturday, 6/1/19     Endocrine: BG at 191 at HS     Skin/Bath: Noted scab/healing wound on right lower shin and bilateral toes.                    Patient progressing towards goals as tolerated, plan of care communicated and reviewed with Sisi Medel and family. All concerns addressed. Will continue to monitor.           Problem: Skin Injury Risk Increased  Goal: Skin Health and Integrity  Outcome: Ongoing (interventions implemented as appropriate)  Pt educated regarding importance of hygiene and turning/frequent weight shift.  Pt verbalized understanding.  WCTM

## 2019-06-01 NOTE — ASSESSMENT & PLAN NOTE
Resolved.  Likely secondary to volume overload with superimposed infection. See bacteremia.  - Now on home O2 requirements  - Continue HD for volume management

## 2019-06-01 NOTE — PROGRESS NOTES
HD initiated cannulated upper left arm with 15 gauge needles, good blood flow noted.   / . Net uf goal set to remove  2 liters  As tolerated. Patient alert and voice no complaints at this time.  Vital stable b/p 105/73

## 2019-06-01 NOTE — PROGRESS NOTES
HD completed and tolerated well net uf 3000ml removed. Post B/P. 107/73 . Post Hd blood returned and  Needles pulled . Post bleeding time < 5 minutes. Patient alert and oriented, and voiced no complaints  At this time.

## 2019-06-01 NOTE — RESIDENT HANDOFF
Handoff     Primary Team: [facility] CRITICAL CARE MEDICINE Room Number: [id number] A     Patient Name: Sisi Medel MRN: [id number]     Date of Birth: [id number] Allergies: Bactrim [sulfamethoxazole-trimethoprim] and Nsaids (non-steroidal anti-inflammatory drug)     Age: 58 y.o. Admit Date: 5/30/2019     Sex: female  BMI: Body mass index is 36.27 kg/m².     Code Status: Full Code            Reason for Admission: Bacteremia    Brief HPI  58F with ESRD on HD (anuric), HFrEF (EF 35%), DM, pulmonary sarcoidosis on home O2, permanent afib w/PM, pHTN. She came in with severe SOB and encephalopathy 1 day after discharged for SOB secondary to volume overload. She got HD earlier that day. Picture coming in looked volume overload, got 2.75L removed in ED with no improvement in respiratory symptoms.    Hospital Course   CXR looked consistent with volume overload. Started on empiric abx for possible PNA as triggering event. Respiratory and mental status improved over the next day or two and as of this AM she's fully at baseline with no acute complaints. However, blood cultures came back positive for strep and GNRs from two sites. No clear source, but CT from march showed possible cystitis and recent CXRs couldn't rule out interstitial PNA. Also had an open foot wound months ago that appears well healed now.    Tasks  Follow up pan-scan and ID consults. On warfarin; INR has been labile.

## 2019-06-01 NOTE — PLAN OF CARE
Problem: Adult Inpatient Plan of Care  Goal: Patient-Specific Goal (Individualization)  Patient resting comfortably.  WCTM

## 2019-06-01 NOTE — PROGRESS NOTES
Sisi Medel is a 58 y.o. female patient.  No complaints   To floor after HD  Scheduled Meds:   sodium chloride 0.9%   Intravenous Once    aspirin  81 mg Oral Daily    cinacalcet  30 mg Oral Daily with breakfast    clopidogrel  75 mg Oral Daily    fludrocortisone  100 mcg Oral BID    levetiracetam IVPB  500 mg Intravenous Q12H    methylPREDNISolone sodium succinate  40 mg Intravenous BID    midodrine  10 mg Oral TID    pantoprazole  40 mg Oral Daily    piperacillin-tazobactam (ZOSYN) IVPB  4.5 g Intravenous Q12H    prednisoLONE acetate  1 drop Left Eye TID    sevelamer carbonate  800 mg Oral TID WM    vitamin renal formula (B-complex-vitamin c-folic acid)  1 capsule Oral Daily       Review of patient's allergies indicates:   Allergen Reactions    Bactrim [sulfamethoxazole-trimethoprim] Other (See Comments)     Causes renal failure    Nsaids (non-steroidal anti-inflammatory drug) Other (See Comments)     Renal failure       Past Medical History:   Diagnosis Date    Anemia in ESRD (end-stage renal disease) 3/7/2016    Anticoagulant long-term use     Cervical radiculopathy 7/20/2015    CHF (congestive heart failure)     Chronic combined systolic and diastolic heart failure 6/14/2013    EF 20% 2013, improved with Medical therapy, negative PET 2013    Chronic respiratory failure with hypoxia 04/22/2013    On home oxygen at 2-5 liters    Closed fracture of proximal end of right fibula 6/27/2016    Complications due to renal dialysis device, implant, and graft     DDD (degenerative disc disease), lumbar 6/17/2015    Dependence on renal dialysis     Diabetes mellitus type II, controlled 12/8/2017    ESRD on hemodialysis 2/23/2016    Essential hypertension     Gout     Lateral meniscal tear 5/31/2016    Lumbar stenosis 6/17/2015    Obesity, Class III, BMI 40-49.9 (morbid obesity)     Pacemaker     Paroxysmal atrial fibrillation 5/16/2014    Not on anticoagulation    Peripheral  neuropathy 2013    Personal history of gastric ulcer 3/19/2013    Pneumonia of left lower lobe due to infectious organism 3/10/2019    Sarcoidosis, lung 2009    Diagnosed in  with ocular manifestation, on 4L home O2 and PO steroids    Secondary pulmonary hypertension 2015    Seizure disorder 3/19/2013    Shingles 2013    Thyroid disease      Past Surgical History:   Procedure Laterality Date    ABDOMINAL SURGERY      ABLATION N/A 2017    [facility] CATH LAB    ANGIOPLASTY Left 1/10/2018    [facility]    ANGIOPLASTY-PERCUTANEOUS TRANSLUMINAL (PTA) Left 2017    Performed by [facility] at [facility] OR [address] [place] [address]    ANGIOPLASTY-PERCUTANEOUS TRANSLUMINAL (PTA) Left 10/12/2016    Performed by [facility] at [facility] OR [address] [place] [address]    CARDIAC PACEMAKER PLACEMENT       SECTION      x2    DECLOT GRAFT PERCUTANEOUS Left 2017    Performed by [facility] at [facility] OR 2ND FLR    DECLOT-GRAFT Left 2016    Performed by [facility]it Starr [facility] at [facility] CATH LAB    DECLOT-GRAFT Left 2016    [facility] at [facility] CATH [facility]    ECHOCARDIOGRAM-TRANSESOPHAGEAL N/A 2013    Performed by Delmy Surgeon at [facility] DELMY    Fistulogram Left 2019    [facility] CATH LAB    fistulogram Left 1/10/2018    Performed by [facility] at [facility] OR 2ND FLR    FISTULOGRAM Left 2017    Performed by [facility] at [facility] CATH LAB    FISTULOGRAM Left 10/12/2016    Performed by [facility] at [facility] OR [facility]    FISTULOGRAM Left 2016    Performed by Harjit Starr MD at [facility] CATH LAB    Fistulogram, LUE AVG, possible intervention Left 2019    Performed by Tor[facility]ana MD at [facility] CATH LAB    Fistulogram, OR 11 Left 2019    [facility]     INJECTION-STEROID-EPIDURAL-TRANSFORAMINAL Right 7/20/2015    [facility]    INJECTION-STEROID-EPIDURAL-TRANSFORAMINAL Right 5/21/2015    Performed by [facility] at [facility]    JOIGBRFXZ-QVEZF-[id number] / Left upper AV graft. Left 2/3/2016    Performed by [facility] at [facility] OR 2ND FLR    VRFLBXSUY-LZJCTOACB-UGWURNEMRNXHK N/A 1/11/2017    Performed by Bladimir Adorno MD at [facility] CATH LAB    OTHER SURGICAL HISTORY      loop recorder implant    PLACEMENT-STENT Left 2/7/2017    Performed by [facility]rey [facility]afana MD at [facility] OR 2ND FLR    PTA, AV FISTULA N/A 1/30/2019    Performed by [facility]afana MD at [facility] CATH LAB    stent to fistula      TRANSESOPHAGEAL ECHOCARDIOGRAM (JARAD) N/A 6/27/2016    Performed by Sourav Machuca MD at [facility] CATH LAB    ULTRASOUND, UPPER GI TRACT, ENDOSCOPIC N/A 1/9/2014    Performed by Stewart Burgess MD at [facility] ENDO (2ND FLR)    VASCULAR SURGERY      fistula to L upper arm       reports that she quit smoking about 25 years ago. Her smoking use included cigarettes. She has a 5.00 pack-year smoking history. She has never used smokeless tobacco. She reports that she does not drink alcohol or use drugs.   Family History   Problem Relation Age of Onset    Kidney failure Mother     Hypertension Mother     Diabetes Mother     Coronary artery disease Father     Hypertension Father     Diabetes Father     Lupus Sister     Diabetes Maternal Grandmother     Colon cancer Neg Hx     Ovarian cancer Neg Hx     Breast cancer Neg Hx           Vital Signs Range (Last 24H):  Temp:  [97.4 °F (36.3 °C)-98.1 °F (36.7 °C)]   Pulse:  [68-72]   Resp:  [17-48]   BP: ()/(58-87)   SpO2:  [89 %-100 %]     I & O (Last 24H):    Intake/Output Summary (Last 24 hours) at 6/1/2019 1204  Last data filed at 6/1/2019 1100  Gross per 24 hour   Intake 1305 ml   Output --   Net 1305 ml           Physical Exam:  Constitutional: She is  oriented to person, place, and time. She appears well-developed. She is cooperative.  No distress. Nasal cannula in place.   HENT:   Head: Atraumatic.   Right Ear: External ear normal.   Left Ear: External ear normal.   Susan-orbital edema   Eyes: Conjunctivae and EOM are normal. Right eye exhibits no discharge. Left eye exhibits no discharge.   Neck: Normal range of motion. Neck supple.   Cardiovascular: Regular rhythm. Exam reveals no gallop and no friction rub.   Murmur heard.  Pulmonary/Chest: No accessory muscle usage. No tachypnea. No respiratory distress. She has decreased breath sounds. She has rales.   Abdominal: Soft. Bowel sounds are normal. She exhibits no distension. There is no tenderness.   Musculoskeletal: She exhibits no edema or deformity.   Neurological: She is alert and oriented to person, place, and time.   Skin: Skin is warm and dry. She is not diaphoretic.   RAISSA AVG        Laboratory:  CBC:   Recent Labs   Lab 06/01/19  0257   WBC 10.97   RBC 4.54   HGB 11.6*   HCT 37.3   *   MCV 82   MCH 25.6*   MCHC 31.1*     BMP:   Recent Labs   Lab 05/29/19  0445  06/01/19  0257      < > 120*      < > 135*   K 4.0   < > 5.0   CL 98   < > 92*   CO2 28   < > 26   BUN 25*   < > 44*   CREATININE 5.4*   < > 6.6*   CALCIUM 8.1*   < > 7.1*   MG 2.1  --   --     < > = values in this interval not displayed.          Diagnostic Results:    End stage renal failure on dialysis  Outpatient HD unit: [facility]   -Nephrologist: Patient does not know  -HD tx days: TThS  -HD tx time: 4hrs 45mins    -HD access: LUE AVF  -HD modality: iHD  -Residual urine: Minimal  -EDW: 111.5 kg     Plan/Recommendations:  -will hold an additional UF/HD sessions today, will plan for dialysis tomorrow AM, orders placed  -will continue challenging her EDW for volume optimization  -hgb 12.8, within range, no Epogen   -consider renal diet with 1 L fluid restriction when appropriate   -restart phos binders  -recommend  serial renal function panels for phos and albumin assessments      HD Today           Chavo Hernández  6/1/2019

## 2019-06-01 NOTE — PLAN OF CARE
Problem: Occupational Therapy Goal  Goal: Occupational Therapy Goal  Goals to be met by: 6/10/2019    Patient will increase functional independence with ADLs by performing:    UE Dressing with [place].  LE Dressing with [place].  Grooming while standing with [place].  Toileting from toilet with [place] for hygiene and clothing management.     Evaluation completed.  OT plan of care developed and reviewed with patient.     BALTA Orlando  6/1/2019  Rehab Services

## 2019-06-01 NOTE — PLAN OF CARE
Problem: Physical Therapy Goal  Goal: Physical Therapy Goal  Goals to be met by: 6/15/19    Patient will increase functional independence with mobility by performin. Supine to sit with Stand-by Assistance -not met  2. Sit to stand transfer with Supervision with AD -not met  3. Gait  x 150 feet with Supervision using Rolling Walker. -not met  4. Ascend/descend 5 stair with right Handrails Contact Guard Assistance  -not met    Evaluation completed and goals appropriate. Lilian Larose, PT 2019

## 2019-06-01 NOTE — ASSESSMENT & PLAN NOTE
--on 4L home O2  --PFTs show severe restriction  --continue steroids  --has history of steroid myopathy

## 2019-06-01 NOTE — ASSESSMENT & PLAN NOTE
GPC resembling Strep and GNRs growing from two sites on initial blood culture. Unclear source. Patient possibly has PNA and CT from 3/10 with possible cystitis. Dentition good. No history of abdominal surgery. Head CT negative.  - Continue vanc and zosyn  - CT chest/abdomen/pelvis with contrast  - ID consult  - Encephalopathy resolved; plan to step down today

## 2019-06-01 NOTE — PROGRESS NOTES
[facility]  Critical Care Medicine  Progress Note    Patient Name: Sisi Medel  MRN: [id number]  Admission Date: 5/30/2019  Hospital Length of Stay: 2 days  Code Status: Full Code  Attending Provider: Bang Cordero MD  Primary Care Provider: Carlos A Caputo MD   Principal Problem: Bacteremia    Subjective:     HPI:  58F with a PMH of ESRD on HD, pulmonary sarcoid on 4L home O2, HFrEF 35% (3/2019), afib/flutter on coumadin s/p 6/2017 AVN ablation with PPM, DM2 (not on insulin), pHTN, YOANDY presents with shortness of breath. Son provides most of the history as patient is awake but non-verbal.  She was discharged yesterday and has been experiencing SOB since then, secondary to volume overload. She received HD this morning but it was incomplete, and she experienced worsening SOB while there. According to EMS, they were called by dialysis's staff because the patient became severely short of breath as she was walking to her car. She reported cough and fatigue. She endorses feeling sick with a wet nonproductive cough and abdominal pain. Denies fever, chills. She is anuric.     VBG in the ED revealed 7/47/52. She was placed on BiPAP in the ED and dialysis was performed with 2.75L removed and minimal improvement in respiratory symptoms. While in the ED awaiting a bed with hospital medicine, she became obtunded and was found to be hypoglycemic to 29. Mental status improved with dextrose.    She was recently admitted 5/27 - 5/29 to observation for respiratory disress which improved with HD.   Patient has been hospitalized multiple times for SOB and acute decompensated heart failure.    Hospital/ICU Course:  Started on empiric antibiotics in the ED. Mental and respiratory status improved over the next 24 hours and weaned down to 4L NC (on 5L at home). Blood cultures grew strep and GNRs from two sites. Patient at baseline AM of 6/1/19.    Interval History/Significant Events: No acute events  overnight. Patient back to baseline mental and respiratory status. No acute complaints. Blood cultures growing strep and GNRs from two sites with no clear source.    Review of Systems   Constitutional: Positive for fatigue. Negative for chills and fever.   HENT: Negative for rhinorrhea, sneezing and sore throat.    Eyes: Negative for visual disturbance.   Respiratory: Positive for cough (nonproductive). Negative for chest tightness, shortness of breath and wheezing.    Cardiovascular: Negative for chest pain, palpitations and leg swelling.   Gastrointestinal: Negative for abdominal pain, constipation, diarrhea, nausea and vomiting.   Genitourinary: Negative for dysuria, flank pain and hematuria.   Musculoskeletal: Positive for myalgias and neck pain. Negative for arthralgias, back pain and neck stiffness.   Skin: Negative for color change and rash.   Neurological: Positive for weakness. Negative for dizziness, light-headedness and headaches.   Psychiatric/Behavioral: Negative for agitation, behavioral problems, confusion and decreased concentration.     Objective:     Vital Signs (Most Recent):  Temp: 97.4 °F (36.3 °C) (06/01/19 0730)  Pulse: 69 (06/01/19 1203)  Resp: (!) 27 (06/01/19 1203)  BP: 97/70 (06/01/19 1203)  SpO2: 100 % (06/01/19 1203) Vital Signs (24h Range):  Temp:  [97.4 °F (36.3 °C)-98.1 °F (36.7 °C)] 97.4 °F (36.3 °C)  Pulse:  [68-72] 69  Resp:  [17-48] 27  SpO2:  [89 %-100 %] 100 %  BP: ()/(58-87) 97/70   Weight: 108.2 kg (238 lb 8.6 oz)  Body mass index is 36.27 kg/m².      Intake/Output Summary (Last 24 hours) at 6/1/2019 1245  Last data filed at 6/1/2019 1100  Gross per 24 hour   Intake 905 ml   Output --   Net 905 ml       Physical Exam   Constitutional: She is oriented to person, place, and time. She appears well-developed and well-nourished. No distress.   Ill appearing, on BiPAP   HENT:   Head: Normocephalic and atraumatic.   Eyes: Pupils are equal, round, and reactive to light. EOM are  normal. No scleral icterus.   Neck: Normal range of motion. Neck supple.   Cardiovascular: Regular rhythm. Tachycardia present. Exam reveals no friction rub.   No murmur heard.  Pulmonary/Chest: Effort normal. No stridor. No respiratory distress. She has rales (bialteral lung bases).   Uncomfortable on bipap   Abdominal: Soft. Bowel sounds are normal. She exhibits no distension. There is no tenderness.   Musculoskeletal: She exhibits no edema or deformity.   Neurological: She is alert and oriented to person, place, and time.   Psychiatric: She has a normal mood and affect. Her behavior is normal.   Nursing note and vitals reviewed.      Vents:  Oxygen Concentration (%): 30 (05/31/19 2317)  Lines/Drains/Airways     Drain                 Hemodialysis AV Graft Left upper arm -- days         Hemodialysis AV Graft Left upper arm -- days          Peripheral Intravenous Line                 Peripheral IV - Single Lumen 05/09/19 1917 24 G Right Hand 22 days         Midline Catheter Insertion/Assessment  - Single Lumen 05/31/19 1154 Right brachial vein 18g x 10cm 1 day         Peripheral IV - Single Lumen 05/30/19 1328 20 G Right Antecubital 1 day              Significant Labs:    CBC/Anemia Profile:  Recent Labs   Lab 05/30/19  1313 05/31/19  0330 06/01/19  0257   WBC 8.63 12.00 10.97   HGB 12.9 12.8 11.6*   HCT 42.4 41.2 37.3   * 120* 144*   MCV 85 84 82   RDW 18.4* 18.3* 18.2*        Chemistries:  Recent Labs   Lab 05/30/19  1313 05/31/19  0330 06/01/19  0257   * 134* 135*   K 3.8 4.5 5.0   CL 87* 91* 92*   CO2 30* 26 26   BUN 10 19 44*   CREATININE 2.9* 4.3* 6.6*   CALCIUM 8.4* 7.8* 7.1*   ALBUMIN 2.9* 2.6* 2.3*   PROT 8.0 7.6 7.2   BILITOT 1.6* 1.3* 1.0   ALKPHOS 109 105 94   * 142* 192*   AST 94* 187* 243*       Coagulation:   Recent Labs   Lab 06/01/19  0257   INR 2.9*       Significant Imaging:  I have reviewed all pertinent imaging results/findings within the past 24 hours.      ABG  Recent Labs    Lab 05/31/19  0950   PH 7.386   PO2 70*   PCO2 48.4*   HCO3 29.0*   BE 4     Assessment/Plan:     Neuro  Epilepsy  --continue home Keppra  --check keppra level    Pulmonary  Acute on chronic respiratory failure with hypoxia and hypercapnia  Resolved.  Likely secondary to volume overload with superimposed infection. See bacteremia.  - Now on home O2 requirements  - Continue HD for volume management    Cardiac/Vascular  Paroxysmal A-fib  --coumadin s/p 6/2017 AVN ablation with PPM  --daily INR    Acute on chronic combined systolic and diastolic heart failure  --BNP on admit 812 in setting of obesity  --Echo (3/11/2019) - EF 35%, decreased LV systolic and diastolic dysfunction  --CXR consistent with pulmonary vascular congestion and edema  --HD for volume removal    Renal/  ESRD on hemodialysis  --HD per nephrology  --takes sevelamer, cinacalcet at home   --lactate elevated in setting of ESRD  --on plavix at home for history of ?AVG thrombosis or ?stent    ID  * Bacteremia  GPC resembling Strep and GNRs growing from two sites on initial blood culture. Unclear source. Patient possibly has PNA and CT from 3/10 with possible cystitis. Dentition good. No history of abdominal surgery. Head CT negative.  - Continue vanc and zosyn  - CT chest/abdomen/pelvis with contrast  - ID consult  - Encephalopathy resolved; plan to step down today    Immunology/Multi System  Pulmonary sarcoidosis  --on 4L home O2  --PFTs show severe restriction  --continue steroids  --has history of steroid myopathy    Endocrine  Diabetes mellitus, type 2  --became hypoglycemic in the ED  --continue to monitor blood glucoses q4  --on steroids      Secondary adrenal insufficiency  --when able to swallow, continue home midodrine, prednisone, fludricortisone 100 mcg BID  --if hypoglycemia and hypotension recur, consider stress dose steroids; however overnight blood pressure and glucose has been stable    GI  Transaminitis  --with  hyperbilirubinemia  --hold home statin  --likely hepatic congestion from volume overload  --trend Tbili    Orthopedic  Wound of right leg  --appears to be healing well  --wound care         Critical Care Daily Checklist:    A: Awake: RASS Goal/Actual Goal:    Actual: Horton Agitation Sedation Scale (RASS): Alert and calm   B: Spontaneous Breathing Trial Performed?     C: SAT & SBT Coordinated?  N/A                      D: Delirium: CAM-ICU Overall CAM-ICU: Negative   E: Early Mobility Performed? Yes   F: Feeding Goal:    Status:     Current Diet Order   Procedures    Diet renal [facility]; Fluid - 2000mL     Order Specific Question:   Indicate patient location for additional diet options:     Answer:   [facility]     Order Specific Question:   Fluid restriction:     Answer:   Fluid - 2000mL      AS: Analgesia/Sedation None   T: Thromboembolic Prophylaxis Warfarin   H: HOB > 300 Yes   U: Stress Ulcer Prophylaxis (if needed) PPI   G: Glucose Control SSI   B: Bowel Function     I: Indwelling Catheter (Lines & Yepez) Necessity Yes   D: De-escalation of Antimicrobials/Pharmacotherapies No    Plan for the day/ETD CT, step down    Code Status:  Family/Goals of Care: Full Code         Critical secondary to Patient has a condition that poses threat to life and bodily function: Severe Respiratory Distress      Critical care was time spent personally by me on the following activities: development of treatment plan with patient or surrogate and bedside caregivers, discussions with consultants, evaluation of patient's response to treatment, examination of patient, ordering and performing treatments and interventions, ordering and review of laboratory studies, ordering and review of radiographic studies, pulse oximetry, re-evaluation of patient's condition. This critical care time did not overlap with that of any other provider or involve time for any procedures.     Aaron Nichols MD  Critical Care  Medicine  [facility]

## 2019-06-01 NOTE — PT/OT/SLP EVAL
Occupational Therapy   Evaluation    Name: Sisi Medel  MRN: [id number]  Admitting Diagnosis:  Bacteremia      Recommendations:     Discharge Recommendations: home health PT, home health OT  Discharge Equipment Recommendations:  none  Barriers to discharge:  None    Assessment:     Sisi Medel is a 58 y.o. female with a medical diagnosis of Bacteremia.  She presents with decline in ADLs and functional mobility. Pt would benefit from skilled OT services in order to maximize independence with ADLs and facilitate safe discharge.. Performance deficits affecting function: impaired endurance, impaired self care skills, impaired functional mobilty, gait instability, impaired cardiopulmonary response to activity.      Rehab Prognosis: Good; patient would benefit from acute skilled OT services to address these deficits and reach maximum level of function.       Plan:     Patient to be seen 3 x/week to address the above listed problems via self-care/home management, therapeutic activities, therapeutic exercises, sensory integration  · Plan of Care Expires: 07/01/19  · Plan of Care Reviewed with: patient    Subjective     Chief Complaint: Shortness of breath with movement  Patient/Family Comments/goals: Pt agreeable to progressing towards independence with ADLs and functional mobility.     Occupational Profile:  Living Environment: Pt lives with significant other, [facility] with 6 [address]  Previous level of function: independent with self care and functional mobility  Equipment Used at Home:  walker, rolling, wheelchair, oxygen, rollator  Assistance upon Discharge: significant other can assist upon d/c    Pain/Comfort:  · Pain Rating 1: 0/10  · Pain Rating Post-Intervention 1: 0/10  · Pain Rating Post-Intervention 2: 0/10    Patients cultural, spiritual, Bahai conflicts given the current situation: no    Objective:     Communicated with: RN prior to session.  Patient found supine with telemetry, pulse ox  (continuous), blood pressure cuff, oxygen, peripheral IV upon OT entry to room.    General Precautions: Standard, fall   Orthopedic Precautions:N/A   Braces: N/A     Occupational Performance:    Bed Mobility:    · Patient completed Supine to Sit with contact guard assistance    Functional Mobility/Transfers:  · Patient completed Sit <> Stand Transfer with contact guard assistance  with  no assistive device   · Patient completed Bed <> Chair Transfer using Step Transfer technique with minimum assistance with no assistive device  · Functional Mobility: Pt ambulated from bed to chair ~2 feet    Activities of Daily Living:  · Feeding:  independence    · Upper Body Dressing: minimum assistance gown  · Lower Body Dressing: maximal assistance socks    Cognitive/Visual Perceptual:  Cognitive/Psychosocial Skills:     -       Safety awareness/insight to disability: intact   -       Mood/Affect/Coping skills/emotional control: Appropriate to situation  Visual/Perceptual:      -Intact      Physical Exam:  Balance: -       good  Upper Extremity Range of Motion:     -       Right Upper Extremity: WFL  -       Left Upper Extremity: WFL  Upper Extremity Strength:    -       Right Upper Extremity: WFL  -       Left Upper Extremity: WFL    AMPAC 6 Click ADL:  AMPAC Total Score: 19    Treatment & Education:  · Pt educated on role of OT in acute care setting.   · Assisted with ADLs and functional mobility with assist levels noted above  Education:    Patient left up in chair with all lines intact and call button in reach    GOALS:   Multidisciplinary Problems     Occupational Therapy Goals        Problem: Occupational Therapy Goal    Goal Priority Disciplines Outcome Interventions   Occupational Therapy Goal     OT, PT/OT     Description:  Goals to be met by: 6/10/2019    Patient will increase functional independence with ADLs by performing:    UE Dressing with [place].  LE Dressing with [place].  Grooming while standing with  [place].  Toileting from toilet with [place] for hygiene and clothing management.                       History:     Past Medical History:   Diagnosis Date    Anemia in ESRD (end-stage renal disease) 3/7/2016    Anticoagulant long-term use     Cervical radiculopathy 7/20/2015    CHF (congestive heart failure)     Chronic combined systolic and diastolic heart failure 6/14/2013    EF 20% 2013, improved with Medical therapy, negative PET 2013    Chronic respiratory failure with hypoxia 04/22/2013    On home oxygen at 2-5 liters    Closed fracture of proximal end of right fibula 6/27/2016    Complications due to renal dialysis device, implant, and graft     DDD (degenerative disc disease), lumbar 6/17/2015    Dependence on renal dialysis     Diabetes mellitus type II, controlled 12/8/2017    ESRD on hemodialysis 2/23/2016    Essential hypertension     Gout     Lateral meniscal tear 5/31/2016    Lumbar stenosis 6/17/2015    Obesity, Class III, BMI 40-49.9 (morbid obesity)     Pacemaker     Paroxysmal atrial fibrillation 5/16/2014    Not on anticoagulation    Peripheral neuropathy 11/13/2013    Personal history of gastric ulcer 3/19/2013    Pneumonia of left lower lobe due to infectious organism 3/10/2019    Sarcoidosis, lung 2009    Diagnosed in 2009 with ocular manifestation, on 4L home O2 and PO steroids    Secondary pulmonary hypertension 4/7/2015    Seizure disorder 3/19/2013    Shingles 11/13/2013    Thyroid disease        Past Surgical History:   Procedure Laterality Date    ABDOMINAL SURGERY      ABLATION N/A 6/12/2017    Performed by [facility] at [facility] CATH LAB    ANGIOPLASTY Left 1/10/2018    Performed by Torrey Villafana MD at [facility] OR 2ND FLR    ANGIOPLASTY-PERCUTANEOUS TRANSLUMINAL (PTA) Left 2/7/2017    Performed by [facility] at [facility] OR 2ND FLR    ANGIOPLASTY-PERCUTANEOUS TRANSLUMINAL (PTA) Left 10/12/2016    Performed by Torrey LOCKETT  MD Broderick at [facility] OR 2ND FLR    CARDIAC PACEMAKER PLACEMENT       SECTION      x2    DECLOT GRAFT PERCUTANEOUS Left 2017    Performed by [facility] MD at [facility] OR 2ND FLR    DECLOT-GRAFT Left 2016    Performed by Harjit Starr MD at [facility] CATH LAB    DECLOT-GRAFT Left 2016    Performed by [facility]jit Starr MD at [facility] CATH LAB    ECHOCARDIOGRAM-TRANSESOPHAGEAL N/A 2013    Performed by Delmy Surgeon at [facility] DELMY    Fistulogram Left 2019    Performed by Tor[facility]ana MD at [facility] CATH LAB    fistulogram Left 1/10/2018    Performed by Torrey [facility]afana MD at [facility] OR 2ND FLR    FISTULOGRAM Left 2017    Performed by Torrey [facility]ana MD at [facility] CATH LAB    FISTULOGRAM Left 10/12/2016    Performed by Torrey [facility]afana MD at [facility] OR 2ND FLR    FISTULOGRAM Left 2016    Performed by Harjit Starr MD at [facility] CATH LAB    Fistulogram, LUE AVG, possible intervention Left 2019    Performed by Tor[facility]ana MD at [facility] CATH LAB    Fistulogram, OR 11 Left 2019    Performed by Torrey Villafana MD at [facility] OR 2ND FLR    INJECTION-STEROID-EPIDURAL-TRANSFORAMINAL Right 2015    Performed by [facility] MD at [facility] PAIN MGT    INJECTION-STEROID-EPIDURAL-TRANSFORAMINAL Right 2015    Performed by [facility]rez MD at [facility] PAIN MGT    NRLHWQTSM-CULFC-ECFIUKGIFXYXP / Left upper AV graft. Left 2/3/2016    Performed by Torrey [facility]afana MD at [facility] OR 2ND FLR    IUPIPSEYR-KBKDOGVLA-KAKLBXJYBRWIB N/A 2017    Performed by Bladimir Adorno MD at [facility] CATH LAB    OTHER SURGICAL HISTORY      loop recorder implant    PLACEMENT-STENT Left 2017    Performed by Torrey [facility]afana MD at [facility] OR 2ND FLR    PTA, AV FISTULA N/A 2019    Performed by Torrey Villafana MD at [facility] CATH LAB    stent to fistula      TRANSESOPHAGEAL ECHOCARDIOGRAM (JARAD) N/A 2016    Performed by Sourav Machuca MD at [facility] CATH LAB     ULTRASOUND, UPPER GI TRACT, ENDOSCOPIC N/A 1/9/2014    Performed by Stewart Burgess MD at [facility] ENDO (2ND FLR)    VASCULAR SURGERY      fistula to L upper arm        Time Tracking:     OT Date of Treatment: 06/01/19  OT Start Time: 0921  OT Stop Time: 0930  OT Total Time (min): 9 min    Billable Minutes:Evaluation 9    BALTA Barbosa  6/1/2019

## 2019-06-01 NOTE — HOSPITAL COURSE
Started on empiric antibiotics in the ED. Mental and respiratory status improved over the next 24 hours and weaned down to 4L NC (on 5L at home). Blood cultures grew strep and GNRs from two sites. Patient at baseline AM of 6/1/19.

## 2019-06-01 NOTE — PLAN OF CARE
Problem: Adult Inpatient Plan of Care  Goal: Plan of Care Review  Outcome: Ongoing (interventions implemented as appropriate)  Pt intermittently decreased mental status/responsiveness, MD aware. VS and assessment per flow sheet, patient progressing towards goals as tolerated, plan of care reviewed with Sisi Medel and family, all concerns addressed, will continue to monitor.

## 2019-06-01 NOTE — PROGRESS NOTES
Pharmacokinetic Assessment Follow Up: IV Vancomycin    Vancomycin Regimen Assessment & Plan:  - Patient ESRD on outpatient HD TThS. Nephrology consulted and ordered HD today.  - Vancomycin level on 6/1 @0257 resulted as 26.2 mcg/mL. Will give vancomycin 500 mg IV x1 dose to be given after hemodialysis completes.  - Vancomycin level ordered to be collected with AM labs tomorrow. Will plan on continuing post-HD vancomycin dosing.    Pharmacy will continue to follow and monitor vancomycin.    Please contact pharmacy at extension [phone] for questions regarding this assessment.    Thank you for the consult,   Tavon Humphrey, PharmD, [facility]  Medical/Surgical ICU Clinical Pharmacist  Spectralink: [phone]     Patient brief summary:  Sisi Medel is a 58 y.o. female initiated on antimicrobial therapy with IV Vancomycin for treatment of suspected lower respiratory infection    Drug Allergies:   Review of patient's allergies indicates:   Allergen Reactions    Bactrim [sulfamethoxazole-trimethoprim] Other (See Comments)     Causes renal failure    Nsaids (non-steroidal anti-inflammatory drug) Other (See Comments)     Renal failure       Actual Body Weight:   108.2 kg    Renal Function:   Estimated Creatinine Clearance: 12.1 mL/min (A) (based on SCr of 6.6 mg/dL (H)).,     Dialysis Method (if applicable):  intermittent HD    CBC (last 72 hours):  Recent Labs   Lab Result Units 05/30/19  1313 05/30/19  1833 05/31/19  0330 06/01/19  0257   WBC K/uL 8.63  --  12.00 10.97   Hemoglobin g/dL 12.9  --  12.8 11.6*   Hemoglobin A1C %  --  6.5*  --   --    Hematocrit % 42.4  --  41.2 37.3   Platelets K/uL 143*  --  120* 144*   Gran% % 78.8*  --  85.9* 87.2*   Lymph% % 11.2*  --  4.2* 5.2*   Mono% % 8.7  --  8.7 6.6   Eosinophil% % 0.5  --  0.0 0.0   Basophil% % 0.1  --  0.2 0.1   Differential Method  Automated  --  Automated Automated       Metabolic Panel (last 72 hours):  Recent Labs   Lab Result Units 05/30/19  1313  05/31/19  0330 06/01/19  0257   Sodium mmol/L 132* 134* 135*   Potassium mmol/L 3.8 4.5 5.0   Chloride mmol/L 87* 91* 92*   CO2 mmol/L 30* 26 26   Glucose mg/dL 90 144* 120*   BUN, Bld mg/dL 10 19 44*   Creatinine mg/dL 2.9* 4.3* 6.6*   Albumin g/dL 2.9* 2.6* 2.3*   Total Bilirubin mg/dL 1.6* 1.3* 1.0   Alkaline Phosphatase U/L 109 105 94   AST U/L 94* 187* 243*   ALT U/L 108* 142* 192*       Vancomycin Administrations:  vancomycin given in the last 96 hours                   vancomycin 1750 mg in 0.9% sodium chloride 500 mL IVPB (mg) 1,750 mg New Bag 05/31/19 0108                Drug levels (last 3 results):  Recent Labs   Lab Result Units 05/31/19  0330 06/01/19  0257   Vancomycin, Random ug/mL 51.5 26.2       Microbiologic Results:  Microbiology Results (last 7 days)     Procedure Component Value Units Date/Time    Blood culture [[id number]] Collected:  05/30/19 1834    Order Status:  Completed Specimen:  Blood from Peripheral, Antecubital, Right Updated:  06/01/19 1239     Blood Culture, Routine Gram stain nazario bottle: Gram positive cocci      Blood Culture, Routine Results called to and read back by:Kayla Quesada RN 05/31/2019 06:03     Blood Culture, Routine Gram stain aer bottle: Gram positive cocci in chains resembling Strep      Blood Culture, Routine Gram negative rods     Blood Culture, Routine Results called to and read back by: Dane Fajardo RN 05/31/2019  09:46     Blood Culture, Routine --     KLEBSIELLA OXYTOCA  For susceptibility see order # [id number]       Blood Culture, Routine --     STREPTOCOCCUS SALIVARIUS  For susceptibility see order # [id number]      Blood culture [[id number]] Collected:  05/30/19 1833    Order Status:  Completed Specimen:  Blood from AV Graft, Left Updated:  06/01/19 1235     Blood Culture, Routine Gram stain aer bottle: Gram positive cocci in chains resembling Strep     Blood Culture, Routine Gram negative rods     Blood Culture, Routine Gram stain nazario bottle: Gram positive  cocci in chains resembling Strep     Blood Culture, Routine Results called to and read back by: Dane Fajardo RN 05/31/2019  09:48     Blood Culture, Routine --     KLEBSIELLA OXYTOCA  Susceptibility pending       Blood Culture, Routine --     STREPTOCOCCUS SALIVARIUS  Susceptibility pending      Blood culture [[id number]] Collected:  05/31/19 1234    Order Status:  Completed Specimen:  Blood from Midline, Basilic, Right Updated:  05/31/19 1945     Blood Culture, Routine No Growth to date    Narrative:       Please collect with AM labs    Blood culture [[id number]] Collected:  05/31/19 1234    Order Status:  Completed Specimen:  Blood from Midline, Basilic, Right Updated:  05/31/19 1945     Blood Culture, Routine No Growth to date    Narrative:       Please collect with AM labs

## 2019-06-01 NOTE — PT/OT/SLP EVAL
Physical Therapy Evaluation    Patient Name:  Sisi Medel   MRN:  [id number]    Recommendations:     Discharge Recommendations:  home health PT   Discharge Equipment Recommendations: none   Barriers to discharge: None    Assessment:     Sisi Medel is a 58 y.o. female admitted with a medical diagnosis of Acute on chronic respiratory failure with hypoxia and hypercapnia.  She presents with the following impairments/functional limitations:  impaired functional mobilty, impaired endurance, gait instability pt avinash treatment well and will benefit from skilled PT 4x/wk to return to previous functional level. Pt should be able to discharge home with HHPT when medically stable. Pt came to ED with c/o SOB .    Rehab Prognosis: Good; patient would benefit from acute skilled PT services to address these deficits and reach maximum level of function.    Recent Surgery: * No surgery found *      Plan:     During this hospitalization, patient to be seen 4 x/week to address the identified rehab impairments via gait training, therapeutic activities and progress toward the following goals:    · Plan of Care Expires:  06/29/19    Subjective     Chief Complaint: pt had no complaints during treatment.   Patient/Family Comments/goals:  To get better and go home.   Pain/Comfort:  · Pain Rating 1: 0/10  · Pain Rating Post-Intervention 1: 0/10    Patients cultural, spiritual, Buddhist conflicts given the current situation: no    Living Environment:  Pt lives with her significant other in 1 story w 5 steps and Right rail.   Prior to admission, patients level of function was modified Independent using rollator .  Equipment used at home: walker, rolling, wheelchair, rollator, oxygen.  DME owned (not currently used): none.  Upon discharge, patient will have assistance from significant other.    Objective:     Communicated with nurse prior to session.  Patient found supine with telemetry, pulse ox (continuous), blood  pressure cuff, oxygen, peripheral IV  upon PT entry to room.    General Precautions: Standard, fall   Orthopedic Precautions:    Braces:       Exams:  · Cognitive Exam:  Patient is oriented to Person, Place, Time and Situation  · RLE ROM: WFL  · RLE Strength: WFL  · LLE ROM: WFL  · LLE Strength: WFL    Functional Mobility:  · Bed Mobility:   Pt needed verbal cues for hand placement and sequencing for functional mobility.   · Rolling Right: contact guard assistance  · Supine to Sit: contact guard assistance Head of bed was elevated  ·   · Transfers:     · Sit to Stand:  contact guard assistance with hand-held assist  ·   · Gait: pt received gait training ~ 2 ft with CGA.        AM-PAC 6 CLICK MOBILITY  Total Score:17     Patient left up in chair with all lines intact and call button in reach.    GOALS:   Multidisciplinary Problems     Physical Therapy Goals        Problem: Physical Therapy Goal    Goal Priority Disciplines Outcome Goal Variances Interventions   Physical Therapy Goal     PT, PT/OT      Description:  Goals to be met by: 6/15/19    Patient will increase functional independence with mobility by performin. Supine to sit with Stand-by Assistance -not met  2. Sit to stand transfer with Supervision with AD -not met  3. Gait  x 150 feet with Supervision using Rolling Walker. -not met  4. Ascend/descend 5 stair with right Handrails Contact Guard Assistance  -not met                      History:     Past Medical History:   Diagnosis Date    Anemia in ESRD (end-stage renal disease) 3/7/2016    Anticoagulant long-term use     Cervical radiculopathy 2015    CHF (congestive heart failure)     Chronic combined systolic and diastolic heart failure 2013    EF 20% , improved with Medical therapy, negative PET     Chronic respiratory failure with hypoxia 2013    On home oxygen at 2-5 liters    Closed fracture of proximal end of right fibula 2016    Complications due to renal  dialysis device, implant, and graft     DDD (degenerative disc disease), lumbar 2015    Dependence on renal dialysis     Diabetes mellitus type II, controlled 2017    ESRD on hemodialysis 2016    Essential hypertension     Gout     Lateral meniscal tear 2016    Lumbar stenosis 2015    Obesity, Class III, BMI 40-49.9 (morbid obesity)     Pacemaker     Paroxysmal atrial fibrillation 2014    Not on anticoagulation    Peripheral neuropathy 2013    Personal history of gastric ulcer 3/19/2013    Pneumonia of left lower lobe due to infectious organism 3/10/2019    Sarcoidosis, lung 2009    Diagnosed in  with ocular manifestation, on 4L home O2 and PO steroids    Secondary pulmonary hypertension 2015    Seizure disorder 3/19/2013    Shingles 2013    Thyroid disease        Past Surgical History:   Procedure Laterality Date    ABDOMINAL SURGERY      ABLATION N/A 2017    Performed by Bladimir Adorno MD at [facility] CATH LAB    ANGIOPLASTY Left 1/10/2018    Performed by Torrey Villafana MD at [facility] OR [address] [place] [address]    ANGIOPLASTY-PERCUTANEOUS TRANSLUMINAL (PTA) Left 2017    [facility]    ANGIOPLASTY-PERCUTANEOUS TRANSLUMINAL (PTA) Left 10/12/2016    Performed by Torrey Villafana MD at [facility] OR [address] [place] [address]    CARDIAC PACEMAKER PLACEMENT       SECTION      x2    DECLOT GRAFT PERCUTANEOUS Left 2017    Performed by Torrey Villafana MD at [facility] OR 2ND FLR    DECLOT-GRAFT Left 2016    Performed by Harjit Starr MD at [facility] CATH LAB    DECLOT-GRAFT Left 2016    Performed by Harjit Starr MD at [facility] CATH LAB    ECHOCARDIOGRAM-TRANSESOPHAGEAL N/A 2013    Performed by Delmy Surgeon at [facility] DELMY    Fistulogram Left 2019    Performed by Torrey Villafana MD at [facility] CATH LAB    fistulogram Left 1/10/2018    Performed by Torrey Villafana MD at [facility] OR [address] [place] [address]     FISTULOGRAM Left 9/13/2017    Performed by [facility] at [facility] CATH LAB    FISTULOGRAM Left 10/12/2016    Performed by Tor[facility] MD at [facility] OR 2ND FLR    FISTULOGRAM Left 6/21/2016    Performed by Harjit Starr MD at [facility] CATH LAB    Fistulogram, LUE AVG, possible intervention Left 1/30/2019    Performed by Tor[facility]ana MD at [facility] CATH LAB    Fistulogram, OR 11 Left 5/8/2019    Performed by Tor[facility]ana MD at [facility] OR 2ND FLR    INJECTION-STEROID-EPIDURAL-TRANSFORAMINAL Right 7/20/2015    Performed by [facility] MGT    INJECTION-STEROID-EPIDURAL-TRANSFORAMINAL Right 5/21/2015    [facility]    UJMQBJMNG-NVBVU-XSLTIWOEWEGFM / Left upper AV graft. Left 2/3/2016    Performed by Torrey [facility]afana MD at [facility] OR 2ND FLR    XXMTRCHRF-QKSNGEYCH-CUDNWXSPVEGEW N/A 1/11/2017    Performed by Bladimir Adorno MD at [facility] CATH LAB    OTHER SURGICAL HISTORY      loop recorder implant    PLACEMENT-STENT Left 2/7/2017    Performed by Torrey [facility]ana MD at [facility] OR 2ND FLR    PTA, AV FISTULA N/A 1/30/2019    Performed by Tor[facility]afana MD at [facility] CATH LAB    stent to fistula      TRANSESOPHAGEAL ECHOCARDIOGRAM (JARAD) N/A 6/27/2016    Performed by Sourav Machuca MD at [facility] CATH LAB    ULTRASOUND, UPPER GI TRACT, ENDOSCOPIC N/A 1/9/2014    Performed by Stewart Burgess MD at [facility] ENDO (2ND FLR)    VASCULAR SURGERY      fistula to L upper arm        Time Tracking:     PT Received On: 06/01/19  PT Start Time: 0923     PT Stop Time: 0934  PT Total Time (min): 11 min     Billable Minutes: Evaluation 11 min      Lilian Larose, PT  06/01/2019

## 2019-06-01 NOTE — SUBJECTIVE & OBJECTIVE
Interval History/Significant Events: No acute events overnight. Patient back to baseline mental and respiratory status. No acute complaints. Blood cultures growing strep and GNRs from two sites with no clear source.    Review of Systems   Constitutional: Positive for fatigue. Negative for chills and fever.   HENT: Negative for rhinorrhea, sneezing and sore throat.    Eyes: Negative for visual disturbance.   Respiratory: Positive for cough (nonproductive). Negative for chest tightness, shortness of breath and wheezing.    Cardiovascular: Negative for chest pain, palpitations and leg swelling.   Gastrointestinal: Negative for abdominal pain, constipation, diarrhea, nausea and vomiting.   Genitourinary: Negative for dysuria, flank pain and hematuria.   Musculoskeletal: Positive for myalgias and neck pain. Negative for arthralgias, back pain and neck stiffness.   Skin: Negative for color change and rash.   Neurological: Positive for weakness. Negative for dizziness, light-headedness and headaches.   Psychiatric/Behavioral: Negative for agitation, behavioral problems, confusion and decreased concentration.     Objective:     Vital Signs (Most Recent):  Temp: 97.4 °F (36.3 °C) (06/01/19 0730)  Pulse: 69 (06/01/19 1203)  Resp: (!) 27 (06/01/19 1203)  BP: 97/70 (06/01/19 1203)  SpO2: 100 % (06/01/19 1203) Vital Signs (24h Range):  Temp:  [97.4 °F (36.3 °C)-98.1 °F (36.7 °C)] 97.4 °F (36.3 °C)  Pulse:  [68-72] 69  Resp:  [17-48] 27  SpO2:  [89 %-100 %] 100 %  BP: ()/(58-87) 97/70   Weight: 108.2 kg (238 lb 8.6 oz)  Body mass index is 36.27 kg/m².      Intake/Output Summary (Last 24 hours) at 6/1/2019 1245  Last data filed at 6/1/2019 1100  Gross per 24 hour   Intake 905 ml   Output --   Net 905 ml       Physical Exam   Constitutional: She is oriented to person, place, and time. She appears well-developed and well-nourished. No distress.   Ill appearing, on BiPAP   HENT:   Head: Normocephalic and atraumatic.   Eyes:  Pupils are equal, round, and reactive to light. EOM are normal. No scleral icterus.   Neck: Normal range of motion. Neck supple.   Cardiovascular: Regular rhythm. Tachycardia present. Exam reveals no friction rub.   No murmur heard.  Pulmonary/Chest: Effort normal. No stridor. No respiratory distress. She has rales (bialteral lung bases).   Uncomfortable on bipap   Abdominal: Soft. Bowel sounds are normal. She exhibits no distension. There is no tenderness.   Musculoskeletal: She exhibits no edema or deformity.   Neurological: She is alert and oriented to person, place, and time.   Psychiatric: She has a normal mood and affect. Her behavior is normal.   Nursing note and vitals reviewed.      Vents:  Oxygen Concentration (%): 30 (05/31/19 2317)  Lines/Drains/Airways     Drain                 Hemodialysis AV Graft Left upper arm -- days         Hemodialysis AV Graft Left upper arm -- days          Peripheral Intravenous Line                 Peripheral IV - Single Lumen 05/09/19 1917 24 G Right Hand 22 days         Midline Catheter Insertion/Assessment  - Single Lumen 05/31/19 1154 Right brachial vein 18g x 10cm 1 day         Peripheral IV - Single Lumen 05/30/19 1328 20 G Right Antecubital 1 day              Significant Labs:    CBC/Anemia Profile:  Recent Labs   Lab 05/30/19  1313 05/31/19  0330 06/01/19  0257   WBC 8.63 12.00 10.97   HGB 12.9 12.8 11.6*   HCT 42.4 41.2 37.3   * 120* 144*   MCV 85 84 82   RDW 18.4* 18.3* 18.2*        Chemistries:  Recent Labs   Lab 05/30/19  1313 05/31/19  0330 06/01/19  0257   * 134* 135*   K 3.8 4.5 5.0   CL 87* 91* 92*   CO2 30* 26 26   BUN 10 19 44*   CREATININE 2.9* 4.3* 6.6*   CALCIUM 8.4* 7.8* 7.1*   ALBUMIN 2.9* 2.6* 2.3*   PROT 8.0 7.6 7.2   BILITOT 1.6* 1.3* 1.0   ALKPHOS 109 105 94   * 142* 192*   AST 94* 187* 243*       Coagulation:   Recent Labs   Lab 06/01/19  0257   INR 2.9*       Significant Imaging:  I have reviewed all pertinent imaging  results/findings within the past 24 hours.

## 2019-06-02 PROBLEM — A40.8 OTHER STREPTOCOCCAL SEPSIS: Status: ACTIVE | Noted: 2019-01-01

## 2019-06-02 PROBLEM — Z75.8 DISCHARGE PLANNING ISSUES: Status: ACTIVE | Noted: 2019-01-01

## 2019-06-02 NOTE — NURSING TRANSFER
Nursing Transfer Note      6/1/2019     Transfer To: CT scan and then to stepdown 698    Transfer via bed    Transfer with 4L to O2    Transported by transporter    Medicines sent: meds sent by Carrie Ponce RN directly to stepdown nurse    Chart send with patient: Yes    Notified: son at bedside with pt

## 2019-06-02 NOTE — CONSULTS
Consult received. Full note to follow. Please call for questions.    Thanks,    Varsha Lozano DO  Infectious Disease Fellow  P: [phone]

## 2019-06-02 NOTE — ASSESSMENT & PLAN NOTE
Home med: warfarin   INR: 3.5 on 6/25    - continue to hold warfarin  - check daily INR, restart warfarin when appropriate

## 2019-06-02 NOTE — PLAN OF CARE
Problem: Fall Injury Risk  Goal: Absence of Fall and Fall-Related Injury  Outcome: Ongoing (interventions implemented as appropriate)  Meds given as ordered tolerated well. No complaints of distress/dicomfort noted. Ci-pap in place for a little while. O2  In progress. Vitals stable. Safety maintained. Will continue to monitor.

## 2019-06-02 NOTE — ASSESSMENT & PLAN NOTE
Home meds: prednisone 10mg daily, fludrocortisone, midodrine    - will continue with stress dose steroids for now  - continue fludrocortisone, midodrine

## 2019-06-02 NOTE — ASSESSMENT & PLAN NOTE
Home med: tradjenta  A1c: 6.5    - has only had minimal insulin requirements so far  - SSI, will add on detemir if consistently requires insulin  - diabetic diet  - hypoglycemic protocol

## 2019-06-02 NOTE — CONSULTS
[facility]  Infectious Disease  Consult Note    Patient Name: Sisi Medel  MRN: [id number]  Admission Date: 5/30/2019  Hospital Length of Stay: 3 days  Attending Physician: Jeanne Ortega MD  Primary Care Provider: Carlos A Caputo MD     Isolation Status: No active isolations    Patient information was obtained from patient and ER records.      Consults  Assessment/Plan:     * Bacteremia  58F PMH ESRD on HD, sarcoidosis on chronic steroids who presents w/ SOB, found to have kleb oxytoca and strep salivarius in multiple blood cultures. ID consulted for eval of possible source. Abd CT negative. Pt does endorse recent abd cramping and nausea, now resolved. Also has some wounds on lower extremities. Pt denies pain or any issues w/ her AV graft    Recommendations:  - d/c vanc and pip-tazo  - start ceftriaxone 2g Q24 for treatment of bacteremia  - source at this time remains unclear - most suspicious for GI source and possible translocation in patient on chronic steroids   - repeat cultures NGTD  - recommend wound care and podiatry consults for lower extremity wounds    ID will follow        Thank you for your consult. I will follow-up with patient. Please contact us if you have any additional questions.    Priti Lozano, DO  Infectious Disease  [facility]    Subjective:     Principal Problem: Bacteremia    HPI: 58F PMH ESRD, sarcoidosis on steroids, AF who presented to ER after increased SOB after most recent HD session. She was found to be in respiratory distress w/ concerns for sepsis. Blood cx were drawn on 5/31 and were + for klebsiella oxytoca and strep salivarius in multiple bottles. CT A/P on 6/1 was negative from intra-abdominal pathology. Patient endorses a history of a few days of nausea and abd cramping prior to feeling unwell. She states her bowel movements have been normal. No recent procedures. No dental issues. She endorses a history of a chronic  wound on her R ankle, which is managed by wound care, and an ulcer on her L great toe which is managed by podiatry. No other skin issues. She states she is feeling better today. She has been afebrile, WBC 12.9. ID consulted for evaluation.    Past Medical History:   Diagnosis Date    Anemia in ESRD (end-stage renal disease) 3/7/2016    Anticoagulant long-term use     Cervical radiculopathy 7/20/2015    CHF (congestive heart failure)     Chronic combined systolic and diastolic heart failure 6/14/2013    EF 20% 2013, improved with Medical therapy, negative PET 2013    Chronic respiratory failure with hypoxia 04/22/2013    On home oxygen at 2-5 liters    Closed fracture of proximal end of right fibula 6/27/2016    Complications due to renal dialysis device, implant, and graft     DDD (degenerative disc disease), lumbar 6/17/2015    Dependence on renal dialysis     Diabetes mellitus type II, controlled 12/8/2017    ESRD on hemodialysis 2/23/2016    Essential hypertension     Gout     Lateral meniscal tear 5/31/2016    Lumbar stenosis 6/17/2015    Obesity, Class III, BMI 40-49.9 (morbid obesity)     Pacemaker     Paroxysmal atrial fibrillation 5/16/2014    Not on anticoagulation    Peripheral neuropathy 11/13/2013    Personal history of gastric ulcer 3/19/2013    Pneumonia of left lower lobe due to infectious organism 3/10/2019    Sarcoidosis, lung 2009    Diagnosed in 2009 with ocular manifestation, on 4L home O2 and PO steroids    Secondary pulmonary hypertension 4/7/2015    Seizure disorder 3/19/2013    Shingles 11/13/2013    Thyroid disease        Past Surgical History:   Procedure Laterality Date    ABDOMINAL SURGERY      ABLATION N/A 6/12/2017    Performed [facility]    ANGIOPLASTY Left 1/10/2018    Performed by [facility] 2ND F[facility]    ANGIOPLASTY-PERCUTANEOUS TRANSLUMINAL (PTA) Left 2/7/2017    Performed by [facility]ana MD at  [facility] OR 2ND FLR    ANGIOPLASTY-PERCUTANEOUS TRANSLUMINAL (PTA) Left 10/12/2016    Performed by Torrey [facility]afana MD at [facility] OR 2ND FLR    CARDIAC PACEMAKER PLACEMENT       SECTION      x2    DECLOT GRAFT PERCUTANEOUS Left 2017    Performed by [facility]rey [facility]afana MD at [facility] OR 2ND FLR    DECLOT-GRAFT Left 2016    Performed by Harjit Starr MD at [facility] CATH LAB    DECLOT-GRAFT Left 2016    Performed by [facility]jit Starr MD at [facility] CATH LAB    ECHOCARDIOGRAM-TRANSESOPHAGEAL N/A 2013    Performed by Delmy Surgeon at [facility] DELMY    Fistulogram Left 2019    Performed by Torrey Villafana MD at [facility] CATH LAB    fistulogram Left 1/10/2018    Performed by Torrey Villafana MD at [facility] OR 2ND FLR    FISTULOGRAM Left 2017    Performed by Torrey Villafana MD at [facility] CATH LAB    FISTULOGRAM Left 10/12/2016    Performed by Torrey Villafana MD at [facility] OR 2ND FLR    FISTULOGRAM Left 2016    Performed by Harjit Starr MD at [facility] CATH LAB    Fistulogram, LUE AVG, possible intervention Left 2019    Performed by Torrey Villafana MD at [facility] CATH LAB    Fistulogram, OR 11 Left 2019    Performed by Torrey Villafana MD at [facility] OR 2ND FLR    INJECTION-STEROID-EPIDURAL-TRANSFORAMINAL Right 2015    Performed by Patria Gutierrez MD at [facility] PAIN MGT    INJECTION-STEROID-EPIDURAL-TRANSFORAMINAL Right 2015    Performed by Patria Gutierrez MD at [facility] PAIN MGT    [id number]-BNNMD-YJLBVOVOWGFAZ / Left upper AV graft. Left 2/3/2016    Performed by [facility]ana MD at [facility] OR 2ND FLR    NMQVJJDAL-XBPSJEFJZ-VHNFLDWAHMNOY N/A 2017    Performed by Bladimir Adorno MD at [facility] CATH LAB    OTHER SURGICAL HISTORY      loop recorder implant    PLACEMENT-STENT Left 2017    Performed by Torrey Villafana MD at [facility] OR 2ND FLR    PTA, AV FISTULA N/A 2019    Performed by Torrey Villafana MD at [facility] CATH LAB    stent to fistula       TRANSESOPHAGEAL ECHOCARDIOGRAM (JARAD) N/A 6/27/2016    Performed by Sourav Machuca MD at [facility] CATH LAB    ULTRASOUND, UPPER GI TRACT, ENDOSCOPIC N/A 1/9/2014    Performed by Stewart Burgess MD at [facility] ENDO (2ND FLR)    VASCULAR SURGERY      fistula to L upper arm        Review of patient's allergies indicates:   Allergen Reactions    Bactrim [sulfamethoxazole-trimethoprim] Other (See Comments)     Causes renal failure    Nsaids (non-steroidal anti-inflammatory drug) Other (See Comments)     Renal failure       Medications:  Medications Prior to Admission   Medication Sig    aspirin (ECOTRIN) 81 MG EC tablet Take 81 mg by mouth once daily.    atorvastatin (LIPITOR) 80 MG tablet TAKE 1 TABLET BY MOUTH EVERY EVENING    benzonatate (TESSALON) 100 MG capsule Take 1 capsule (100 mg total) by mouth 3 (three) times daily. for 10 days    blood sugar diagnostic Strp 1 each by Misc.(Non-Drug; Combo Route) route once daily. (Patient taking differently: 1 each by Misc.(Non-Drug; Combo Route) route 2 (two) times daily. )    cinacalcet (SENSIPAR) 30 MG Tab Take 30 mg by mouth daily with breakfast.    clopidogrel (PLAVIX) 75 mg tablet TAKE 1 TABLET(75 MG) BY MOUTH EVERY DAY    fludrocortisone (FLORINEF) 0.1 mg Tab Take 1 tablet (100 mcg total) by mouth 2 (two) times daily.    gabapentin (NEURONTIN) 600 MG tablet Take 600 mg by mouth 2 (two) times daily.     lancets Misc 1 each by Misc.(Non-Drug; Combo Route) route once daily. (Patient taking differently: 1 each by Misc.(Non-Drug; Combo Route) route 2 (two) times daily. )    levETIRAcetam (KEPPRA) 500 MG Tab TAKE 1 TABLET BY MOUTH TWICE DAILY    midodrine (PROAMATINE) 10 MG tablet Take 1 tablet (10 mg total) by mouth 3 (three) times daily.    nortriptyline (PAMELOR) 25 MG capsule Take 1 capsule by mouth nightly    omeprazole (PRILOSEC) 20 MG capsule TAKE 1 CAPSULE BY MOUTH EVERY DAY    patiromer calcium sorbitex (VELTASSA) 16.8 gram PwPk Take 33.6 g by mouth  once daily.     prednisoLONE acetate (PRED FORTE) 1 % DrpS INSTILL ONE DROP INTO  LEFT EYE THREE TIMES A DAY    predniSONE (DELTASONE) 20 MG tablet Take 0.5 tablets (10 mg total) by mouth once daily.    pregabalin (LYRICA) 100 MG capsule Take 1 capsule (100 mg total) by mouth 3 (three) times daily.    PROLENSA 0.07 % Drop INSTILL 1 DROP IN LEFT / RIGHT EYE QD    DENISE-LINDA 0.8 mg Tab TAKE 1 TABLET BY MOUTH ONCE DAILY    RENVELA 800 mg Tab TAKE 1 TABLET BY MOUTH THREE TIMES DAILY WITH MEALS    sodium polystyrene (KAYEXALATE) powder TK 30 GRAMS PO D    tiZANidine (ZANAFLEX) 4 MG tablet TAKE 1 TABLET BY MOUTH EVERY DAY AS NEEDED FOR MUSCLE SPASMS    TRADJENTA 5 mg Tab tablet TAKE 1 TABLET(5 MG) BY MOUTH EVERY DAY    warfarin (COUMADIN) 5 MG tablet TAKE 1 TABLET BY MOUTH EVERY DAY EXCEPT TH 1AND 1/2 TABLETS ON MONDAY AND FRIDAY OR AS DIRECTED (Patient taking differently: TAKE 1 TABLET BY MOUTH EVERY DAY OR AS DIRECTED)     Antibiotics (From admission, onward)    Start     Stop Route Frequency Ordered    06/02/19 1800  cefTRIAXone (ROCEPHIN) 2 g in dextrose 5 % 50 mL IVPB      -- IV Every 24 hours (non-standard times) 06/02/19 1654        Antifungals (From admission, onward)    None        Antivirals (From admission, onward)    None           Immunization History   Administered Date(s) Administered    Influenza 10/14/2009, 09/21/2010, 09/12/2011, 10/04/2012, 09/15/2014, 09/16/2015, 09/01/2016    Influenza - Quadrivalent - PF 09/15/2014, 09/01/2016    Influenza - Trivalent (ADULT) 09/06/2013, 09/15/2014, 09/16/2015    PPD Test 08/08/2010, 02/19/2016, 03/25/2019    Pneumococcal Conjugate - 13 Valent 05/02/2018    Pneumococcal Polysaccharide - 23 Valent 06/14/2012, 06/14/2016    Td (ADULT) 02/07/2008    Tdap 08/01/2018       Family History     Problem Relation (Age of Onset)    Coronary artery disease Father    Diabetes Mother, Father, Maternal Grandmother    Hypertension Mother, Father    Kidney failure  Mother    Lupus Sister        Social History     Socioeconomic History    Marital status: Single     Spouse name: Not on file    Number of children: Not on file    Years of education: Not on file    Highest education level: Not on file   Occupational History    Not on file   Social Needs    Financial resource strain: Not on file    Food insecurity:     Worry: Not on file     Inability: Not on file    Transportation needs:     Medical: Not on file     Non-medical: Not on file   Tobacco Use    Smoking status: Former Smoker     Packs/day: 0.50     Years: 10.00     Pack years: 5.00     Types: Cigarettes     Last attempt to quit: 1994     Years since quittin.1    Smokeless tobacco: Never Used   Substance and Sexual Activity    Alcohol use: No     Alcohol/week: 0.0 oz     Comment: rarely    Drug use: No    Sexual activity: Not Currently   Lifestyle    Physical activity:     Days per week: Not on file     Minutes per session: Not on file    Stress: Not on file   Relationships    Social connections:     Talks on phone: Not on file     Gets together: Not on file     Attends Tenriism service: Not on file     Active member of club or organization: Not on file     Attends meetings of clubs or organizations: Not on file     Relationship status: Not on file   Other Topics Concern    Are you pregnant or think you may be? No    Breast-feeding No   Social History Narrative    Lives with boyfriend/partner who helps with her care.      Review of Systems   Constitutional: Negative for activity change, appetite change, chills, fever and unexpected weight change.   HENT: Negative for dental problem, ear discharge, ear pain, mouth sores, sinus pain, sore throat and trouble swallowing.    Eyes: Negative for pain and discharge.   Respiratory: Positive for shortness of breath. Negative for cough, chest tightness and wheezing.    Cardiovascular: Negative for chest pain and leg swelling.   Gastrointestinal:  Positive for abdominal distention and nausea. Negative for abdominal pain, constipation, diarrhea and vomiting.   Genitourinary: Negative for difficulty urinating, dysuria, flank pain, frequency, genital sores and hematuria.   Musculoskeletal: Negative for arthralgias, joint swelling, neck pain and neck stiffness.   Skin: Negative for color change, rash and wound.   Neurological: Negative for dizziness, weakness, light-headedness, numbness and headaches.   Psychiatric/Behavioral: Positive for confusion. The patient is not nervous/anxious.      Objective:     Vital Signs (Most Recent):  Temp: 97.4 °F (36.3 °C) (06/02/19 1600)  Pulse: 70 (06/02/19 1600)  Resp: 18 (06/02/19 1600)  BP: 111/81 (06/02/19 1600)  SpO2: 96 % (06/02/19 1600) Vital Signs (24h Range):  Temp:  [96 °F (35.6 °C)-97.7 °F (36.5 °C)] 97.4 °F (36.3 °C)  Pulse:  [69-72] 70  Resp:  [17-26] 18  SpO2:  [96 %-100 %] 96 %  BP: ()/(64-83) 111/81     Weight: 108.2 kg (238 lb 8.6 oz)  Body mass index is 36.27 kg/m².    Estimated Creatinine Clearance: 15.1 mL/min (A) (based on SCr of 5.3 mg/dL (H)).    Physical Exam   Constitutional: She is oriented to person, place, and time. She appears well-developed and well-nourished. No distress.   HENT:   Right Ear: External ear normal.   Left Ear: External ear normal.   Nose: Nose normal.   Mouth/Throat: Oropharynx is clear and moist.   Eyes: Conjunctivae and EOM are normal.   Neck: Normal range of motion. Neck supple.   Cardiovascular: Normal rate, normal heart sounds and intact distal pulses.   No murmur heard.  Pulmonary/Chest: Effort normal and breath sounds normal. No respiratory distress. She has no wheezes.   Abdominal: Soft. Bowel sounds are normal. She exhibits no distension. There is no tenderness.   Musculoskeletal: Normal range of motion. She exhibits no edema.   Neurological: She is alert and oriented to person, place, and time. No cranial nerve deficit. Coordination normal.   Skin: Skin is warm and  dry. No rash noted. She is not diaphoretic. No erythema.   Small ulceration on R lateral ankle, L great toe wound   Psychiatric: She has a normal mood and affect. Her behavior is normal.   Vitals reviewed.      Significant Labs:   CBC:   Recent Labs   Lab 06/01/19  0257 06/02/19  0610   WBC 10.97 12.89*   HGB 11.6* 13.2   HCT 37.3 41.0   * 122*     CMP:   Recent Labs   Lab 06/01/19  0257 06/02/19  0644   * 135*   K 5.0 5.1   CL 92* 95   CO2 26 22*   * 159*   BUN 44* 39*   CREATININE 6.6* 5.3*   CALCIUM 7.1* 8.0*   PROT 7.2 8.2   ALBUMIN 2.3* 2.7*   BILITOT 1.0 0.8   ALKPHOS 94 108   * 199*   * 233*   ANIONGAP 17* 18*   EGFRNONAA 6.4* 8.3*     Microbiology Results (last 7 days)     Procedure Component Value Units Date/Time    Blood culture [[id number]] Collected:  05/31/19 1234    Order Status:  Completed Specimen:  Blood from Midline, Basilic, Right Updated:  06/02/19 1412     Blood Culture, Routine No Growth to date     Blood Culture, Routine No Growth to date     Blood Culture, Routine No Growth to date    Narrative:       Please collect with AM labs    Blood culture [[id number]] Collected:  05/31/19 1234    Order Status:  Completed Specimen:  Blood from Midline, Basilic, Right Updated:  06/02/19 1412     Blood Culture, Routine No Growth to date     Blood Culture, Routine No Growth to date     Blood Culture, Routine No Growth to date    Narrative:       Please collect with AM labs    Blood culture [[id number]]  (Susceptibility) Collected:  05/30/19 1833    Order Status:  Completed Specimen:  Blood from AV Graft, Left Updated:  06/02/19 1406     Blood Culture, Routine Gram stain aer bottle: Gram positive cocci in chains resembling Strep     Blood Culture, Routine Gram negative rods     Blood Culture, Routine Gram stain nazario bottle: Gram positive cocci in chains resembling Strep     Blood Culture, Routine Results called to and read back by: Dane Fajardo RN 05/31/2019  09:48     Blood  Culture, Routine KLEBSIELLA OXYTOCA     Blood Culture, Routine STREPTOCOCCUS SALIVARIUS    Blood culture [[id number]] Collected:  05/30/19 1834    Order Status:  Completed Specimen:  Blood from Peripheral, Antecubital, Right Updated:  06/02/19 1118     Blood Culture, Routine Gram stain nazario bottle: Gram positive cocci      Blood Culture, Routine Results called to and read back by:Kayla Quesada RN 05/31/2019 06:03     Blood Culture, Routine Gram stain aer bottle: Gram positive cocci in chains resembling Strep      Blood Culture, Routine Gram negative rods     Blood Culture, Routine Results called to and read back by: Dane Fajardo RN 05/31/2019  09:46     Blood Culture, Routine --     KLEBSIELLA OXYTOCA  For susceptibility see order # [id number]       Blood Culture, Routine --     STREPTOCOCCUS SALIVARIUS  For susceptibility see order # [id number]            Significant Imaging: I have reviewed all pertinent imaging results/findings within the past 24 hours.

## 2019-06-02 NOTE — ASSESSMENT & PLAN NOTE
58F PMH ESRD on HD, sarcoidosis on chronic steroids who presents w/ SOB, found to have kleb oxytoca and strep salivarius in multiple blood cultures. ID consulted for eval of possible source. Abd CT negative. Pt does endorse recent abd cramping and nausea, now resolved. Also has some wounds on lower extremities. Pt denies pain or any issues w/ her AV graft    Recommendations:  - d/c vanc and pip-tazo  - start ceftriaxone 2g Q24 for treatment of bacteremia  - source at this time remains unclear - most suspicious for GI source and possible translocation in patient on chronic steroids   - repeat cultures NGTD    ID will follow

## 2019-06-02 NOTE — HPI
58F PMH ESRD, sarcoidosis on steroids, AF who presented to ER after increased SOB after most recent HD session. She was found to be in respiratory distress w/ concerns for sepsis. Blood cx were drawn on 5/31 and were + for klebsiella oxytoca and strep salivarius in multiple bottles. CT A/P on 6/1 was negative from intra-abdominal pathology. Patient endorses a history of a few days of nausea and abd cramping prior to feeling unwell. She states her bowel movements have been normal. No recent procedures. No dental issues. She endorses a history of a chronic wound on her R ankle, which is managed by wound care, and an ulcer on her L great toe which is managed by podiatry. No other skin issues. She states she is feeling better today. She has been afebrile, WBC 12.9. ID consulted for evaluation.

## 2019-06-02 NOTE — PROGRESS NOTES
Therapy with vancomycin complete and/or consult discontinued by provider.  Pharmacy will sign off, please re-consult as needed.    Angelina Valladares, PharmD, BCPS  [id number]

## 2019-06-02 NOTE — PROGRESS NOTES
Therapy with vancomcyin complete and/or consult discontinued by provider.  Pharmacy will sign off, please re-consult as needed.    Angelina Valladares, PharmD, BCPS  [id number]

## 2019-06-02 NOTE — ASSESSMENT & PLAN NOTE
Blood cultures: Klebsiella oxytoca and Streptococcus salivarius. Repeat BCx NGTD    CT Chest/Abd/Pelvis: Symmetric central lung hazy and streaky opacities are consistent with sarcoidosis similar with 03/07/2016.  There is probably superimposed subsegmental atelectasis at the bases.  No current adenopathy. No intra-abdominal inflammatory process    - will continue with IV zosyn for now, discontinue vancomycin  - infectious disease consult to help with antibiotic choice and duration, ensure no missed source of infection  - continue with stress dose steroids for now

## 2019-06-02 NOTE — PLAN OF CARE
Patient c/o headache this morning,  tylenol administered, relief obtained. Meds taken whole without complications. IV antibiotics.  Family at bedside.Vitals WNL. 4LO2 NC.  Bed locked in lowest position, call light within reach. No Falls. Safety maintained. Will continue to monitor.

## 2019-06-02 NOTE — SUBJECTIVE & OBJECTIVE
Past Medical History:   Diagnosis Date    Anemia in ESRD (end-stage renal disease) 3/7/2016    Anticoagulant long-term use     Cervical radiculopathy 2015    CHF (congestive heart failure)     Chronic combined systolic and diastolic heart failure 2013    EF 20% , improved with Medical therapy, negative PET     Chronic respiratory failure with hypoxia 2013    On home oxygen at 2-5 liters    Closed fracture of proximal end of right fibula 2016    Complications due to renal dialysis device, implant, and graft     DDD (degenerative disc disease), lumbar 2015    Dependence on renal dialysis     Diabetes mellitus type II, controlled 2017    ESRD on hemodialysis 2016    Essential hypertension     Gout     Lateral meniscal tear 2016    Lumbar stenosis 2015    Obesity, Class III, BMI 40-49.9 (morbid obesity)     Pacemaker     Paroxysmal atrial fibrillation 2014    Not on anticoagulation    Peripheral neuropathy 2013    Personal history of gastric ulcer 3/19/2013    Pneumonia of left lower lobe due to infectious organism 3/10/2019    Sarcoidosis, lung 2009    Diagnosed in  with ocular manifestation, on 4L home O2 and PO steroids    Secondary pulmonary hypertension 2015    Seizure disorder 3/19/2013    Shingles 2013    Thyroid disease        Past Surgical History:   Procedure Laterality Date    ABDOMINAL SURGERY      ABLATION N/A 2017    Performed by Bladimir Adorno MD at [facility] CATH LAB    ANGIOPLASTY Left 1/10/2018    Performed by Torrey Villafana MD at [facility] OR 2ND FLR    ANGIOPLASTY-PERCUTANEOUS TRANSLUMINAL (PTA) Left 2017    Performed by Torrey Villafana MD at [facility] OR 2ND FLR    ANGIOPLASTY-PERCUTANEOUS TRANSLUMINAL (PTA) Left 10/12/2016    Performed by Torrey Villafana MD at [facility] OR 2ND FLR    CARDIAC PACEMAKER PLACEMENT       SECTION      x2    DECLOT GRAFT PERCUTANEOUS Left  2/7/2017    Performed by [facility] MD at [facility] OR 2ND FLR    DECLOT-GRAFT Left 6/21/2016    Performed by Harjit Starr MD at [facility] CATH LAB    DECLOT-GRAFT Left 6/20/2016    Performed by [facility]jit Starr MD at [facility] CATH LAB    ECHOCARDIOGRAM-TRANSESOPHAGEAL N/A 6/27/2013    Performed by Delmy Surgeon at [facility] DELMY    Fistulogram Left 4/8/2019    Performed by Torrey [facility]afana MD at [facility] CATH LAB    fistulogram Left 1/10/2018    Performed by Torrey Villafana MD at [facility] OR 2ND FLR    FISTULOGRAM Left 9/13/2017    Performed by Torrey Villafana MD at [facility] CATH LAB    FISTULOGRAM Left 10/12/2016    Performed by Torrey Villafana MD at [facility] OR 2ND FLR    FISTULOGRAM Left 6/21/2016    Performed by Harjit Starr MD at [facility] CATH LAB    Fistulogram, LUE AVG, possible intervention Left 1/30/2019    Performed by Torrey Villafana MD at [facility] CATH LAB    Fistulogram, OR 11 Left 5/8/2019    Performed by Torrey Villafana MD at [facility] OR 2ND FLR    INJECTION-STEROID-EPIDURAL-TRANSFORAMINAL Right 7/20/2015    Performed by Patria Gutierrez MD at [facility] PAIN MGT    INJECTION-STEROID-EPIDURAL-TRANSFORAMINAL Right 5/21/2015    Performed by Patria Gutierrez MD at [facility] PAIN MGT    SSQWXAHEH-QRAJD-CQRBIDGFJTDSP / Left upper AV graft. Left 2/3/2016    Performed by Tor[facility]ana MD at [facility] OR 2ND FLR    NANHDMUIP-GQLUIEGRL-MTEISYYZBXJXV N/A 1/11/2017    Performed by Bladimir Adorno MD at [facility] CATH LAB    OTHER SURGICAL HISTORY      loop recorder implant    PLACEMENT-STENT Left 2/7/2017    Performed by Tor[facility]ana MD at [facility] OR 2ND FLR    PTA, AV FISTULA N/A 1/30/2019    Performed by [facility]ana MD at [facility] CATH LAB    stent to fistula      TRANSESOPHAGEAL ECHOCARDIOGRAM (JARAD) N/A 6/27/2016    Performed by Sourav Machuca MD at [facility] CATH LAB    ULTRASOUND, UPPER GI TRACT, ENDOSCOPIC N/A 1/9/2014    Performed by Stewart Burgess MD at [facility] ENDO (2ND FLR)    VASCULAR SURGERY       fistula to L upper arm        Review of patient's allergies indicates:   Allergen Reactions    Bactrim [sulfamethoxazole-trimethoprim] Other (See Comments)     Causes renal failure    Nsaids (non-steroidal anti-inflammatory drug) Other (See Comments)     Renal failure       Medications:  Medications Prior to Admission   Medication Sig    aspirin (ECOTRIN) 81 MG EC tablet Take 81 mg by mouth once daily.    atorvastatin (LIPITOR) 80 MG tablet TAKE 1 TABLET BY MOUTH EVERY EVENING    benzonatate (TESSALON) 100 MG capsule Take 1 capsule (100 mg total) by mouth 3 (three) times daily. for 10 days    blood sugar diagnostic Strp 1 each by Misc.(Non-Drug; Combo Route) route once daily. (Patient taking differently: 1 each by Misc.(Non-Drug; Combo Route) route 2 (two) times daily. )    cinacalcet (SENSIPAR) 30 MG Tab Take 30 mg by mouth daily with breakfast.    clopidogrel (PLAVIX) 75 mg tablet TAKE 1 TABLET(75 MG) BY MOUTH EVERY DAY    fludrocortisone (FLORINEF) 0.1 mg Tab Take 1 tablet (100 mcg total) by mouth 2 (two) times daily.    gabapentin (NEURONTIN) 600 MG tablet Take 600 mg by mouth 2 (two) times daily.     lancets Misc 1 each by Misc.(Non-Drug; Combo Route) route once daily. (Patient taking differently: 1 each by Misc.(Non-Drug; Combo Route) route 2 (two) times daily. )    levETIRAcetam (KEPPRA) 500 MG Tab TAKE 1 TABLET BY MOUTH TWICE DAILY    midodrine (PROAMATINE) 10 MG tablet Take 1 tablet (10 mg total) by mouth 3 (three) times daily.    nortriptyline (PAMELOR) 25 MG capsule Take 1 capsule by mouth nightly    omeprazole (PRILOSEC) 20 MG capsule TAKE 1 CAPSULE BY MOUTH EVERY DAY    patiromer calcium sorbitex (VELTASSA) 16.8 gram PwPk Take 33.6 g by mouth once daily.     prednisoLONE acetate (PRED FORTE) 1 % DrpS INSTILL ONE DROP INTO  LEFT EYE THREE TIMES A DAY    predniSONE (DELTASONE) 20 MG tablet Take 0.5 tablets (10 mg total) by mouth once daily.    pregabalin (LYRICA) 100 MG capsule  Take 1 capsule (100 mg total) by mouth 3 (three) times daily.    PROLENSA 0.07 % Drop INSTILL 1 DROP IN LEFT / RIGHT EYE QD    DENISE-LINDA 0.8 mg Tab TAKE 1 TABLET BY MOUTH ONCE DAILY    RENVELA 800 mg Tab TAKE 1 TABLET BY MOUTH THREE TIMES DAILY WITH MEALS    sodium polystyrene (KAYEXALATE) powder TK 30 GRAMS PO D    tiZANidine (ZANAFLEX) 4 MG tablet TAKE 1 TABLET BY MOUTH EVERY DAY AS NEEDED FOR MUSCLE SPASMS    TRADJENTA 5 mg Tab tablet TAKE 1 TABLET(5 MG) BY MOUTH EVERY DAY    warfarin (COUMADIN) 5 MG tablet TAKE 1 TABLET BY MOUTH EVERY DAY EXCEPT TH 1AND 1/2 TABLETS ON MONDAY AND FRIDAY OR AS DIRECTED (Patient taking differently: TAKE 1 TABLET BY MOUTH EVERY DAY OR AS DIRECTED)     Antibiotics (From admission, onward)    Start     Stop Route Frequency Ordered    06/02/19 1800  cefTRIAXone (ROCEPHIN) 2 g in dextrose 5 % 50 mL IVPB      -- IV Every 24 hours (non-standard times) 06/02/19 1654        Antifungals (From admission, onward)    None        Antivirals (From admission, onward)    None           Immunization History   Administered Date(s) Administered    Influenza 10/14/2009, 09/21/2010, 09/12/2011, 10/04/2012, 09/15/2014, 09/16/2015, 09/01/2016    Influenza - Quadrivalent - PF 09/15/2014, 09/01/2016    Influenza - Trivalent (ADULT) 09/06/2013, 09/15/2014, 09/16/2015    PPD Test 08/08/2010, 02/19/2016, 03/25/2019    Pneumococcal Conjugate - 13 Valent 05/02/2018    Pneumococcal Polysaccharide - 23 Valent 06/14/2012, 06/14/2016    Td (ADULT) 02/07/2008    Tdap 08/01/2018       Family History     Problem Relation (Age of Onset)    Coronary artery disease Father    Diabetes Mother, Father, Maternal Grandmother    Hypertension Mother, Father    Kidney failure Mother    Lupus Sister        Social History     Socioeconomic History    Marital status: Single     Spouse name: Not on file    Number of children: Not on file    Years of education: Not on file    Highest education level: Not on file    Occupational History    Not on file   Social Needs    Financial resource strain: Not on file    Food insecurity:     Worry: Not on file     Inability: Not on file    Transportation needs:     Medical: Not on file     Non-medical: Not on file   Tobacco Use    Smoking status: Former Smoker     Packs/day: 0.50     Years: 10.00     Pack years: 5.00     Types: Cigarettes     Last attempt to quit: 1994     Years since quittin.1    Smokeless tobacco: Never Used   Substance and Sexual Activity    Alcohol use: No     Alcohol/week: 0.0 oz     Comment: rarely    Drug use: No    Sexual activity: Not Currently   Lifestyle    Physical activity:     Days per week: Not on file     Minutes per session: Not on file    Stress: Not on file   Relationships    Social connections:     Talks on phone: Not on file     Gets together: Not on file     Attends [employer] service: Not on file     Active member of club or organization: Not on file     Attends meetings of clubs or organizations: Not on file     Relationship status: Not on file   Other Topics Concern    Are you pregnant or think you may be? No    Breast-feeding No   Social History Narrative    Lives with boyfriend/partner who helps with her care.      Review of Systems   Constitutional: Negative for activity change, appetite change, chills, fever and unexpected weight change.   HENT: Negative for dental problem, ear discharge, ear pain, mouth sores, sinus pain, sore throat and trouble swallowing.    Eyes: Negative for pain and discharge.   Respiratory: Positive for shortness of breath. Negative for cough, chest tightness and wheezing.    Cardiovascular: Negative for chest pain and leg swelling.   Gastrointestinal: Positive for abdominal distention and nausea. Negative for abdominal pain, constipation, diarrhea and vomiting.   Genitourinary: Negative for difficulty urinating, dysuria, flank pain, frequency, genital sores and hematuria.   Musculoskeletal:  Negative for arthralgias, joint swelling, neck pain and neck stiffness.   Skin: Negative for color change, rash and wound.   Neurological: Negative for dizziness, weakness, light-headedness, numbness and headaches.   Psychiatric/Behavioral: Positive for confusion. The patient is not nervous/anxious.      Objective:     Vital Signs (Most Recent):  Temp: 97.4 °F (36.3 °C) (06/02/19 1600)  Pulse: 70 (06/02/19 1600)  Resp: 18 (06/02/19 1600)  BP: 111/81 (06/02/19 1600)  SpO2: 96 % (06/02/19 1600) Vital Signs (24h Range):  Temp:  [96 °F (35.6 °C)-97.7 °F (36.5 °C)] 97.4 °F (36.3 °C)  Pulse:  [69-72] 70  Resp:  [17-26] 18  SpO2:  [96 %-100 %] 96 %  BP: ()/(64-83) 111/81     Weight: 108.2 kg (238 lb 8.6 oz)  Body mass index is 36.27 kg/m².    Estimated Creatinine Clearance: 15.1 mL/min (A) (based on SCr of 5.3 mg/dL (H)).    Physical Exam   Constitutional: She is oriented to person, place, and time. She appears well-developed and well-nourished. No distress.   HENT:   Right Ear: External ear normal.   Left Ear: External ear normal.   Nose: Nose normal.   Mouth/Throat: Oropharynx is clear and moist.   Eyes: Conjunctivae and EOM are normal.   Neck: Normal range of motion. Neck supple.   Cardiovascular: Normal rate, normal heart sounds and intact distal pulses.   No murmur heard.  Pulmonary/Chest: Effort normal and breath sounds normal. No respiratory distress. She has no wheezes.   Abdominal: Soft. Bowel sounds are normal. She exhibits no distension. There is no tenderness.   Musculoskeletal: Normal range of motion. She exhibits no edema.   Neurological: She is alert and oriented to person, place, and time. No cranial nerve deficit. Coordination normal.   Skin: Skin is warm and dry. No rash noted. She is not diaphoretic. No erythema.   Small ulceration on R lateral ankle, L great toe wound   Psychiatric: She has a normal mood and affect. Her behavior is normal.   Vitals reviewed.      Significant Labs:   CBC:   Recent  Labs   Lab 06/01/19  0257 06/02/19  0610   WBC 10.97 12.89*   HGB 11.6* 13.2   HCT 37.3 41.0   * 122*     CMP:   Recent Labs   Lab 06/01/19  0257 06/02/19  0644   * 135*   K 5.0 5.1   CL 92* 95   CO2 26 22*   * 159*   BUN 44* 39*   CREATININE 6.6* 5.3*   CALCIUM 7.1* 8.0*   PROT 7.2 8.2   ALBUMIN 2.3* 2.7*   BILITOT 1.0 0.8   ALKPHOS 94 108   * 199*   * 233*   ANIONGAP 17* 18*   EGFRNONAA 6.4* 8.3*     Microbiology Results (last 7 days)     Procedure Component Value Units Date/Time    Blood culture [[id number]] Collected:  05/31/19 1234    Order Status:  Completed Specimen:  Blood from Midline, Basilic, Right Updated:  06/02/19 1412     Blood Culture, Routine No Growth to date     Blood Culture, Routine No Growth to date     Blood Culture, Routine No Growth to date    Narrative:       Please collect with AM labs    Blood culture [[id number]] Collected:  05/31/19 1234    Order Status:  Completed Specimen:  Blood from Midline, Basilic, Right Updated:  06/02/19 1412     Blood Culture, Routine No Growth to date     Blood Culture, Routine No Growth to date     Blood Culture, Routine No Growth to date    Narrative:       Please collect with AM labs    Blood culture [[id number]]  (Susceptibility) Collected:  05/30/19 1833    Order Status:  Completed Specimen:  Blood from AV Graft, Left Updated:  06/02/19 1406     Blood Culture, Routine Gram stain aer bottle: Gram positive cocci in chains resembling Strep     Blood Culture, Routine Gram negative rods     Blood Culture, Routine Gram stain nazario bottle: Gram positive cocci in chains resembling Strep     Blood Culture, Routine Results called to and read back by: Dane Fajardo RN 05/31/2019  09:48     Blood Culture, Routine KLEBSIELLA OXYTOCA     Blood Culture, Routine STREPTOCOCCUS SALIVARIUS    Blood culture [[id number]] Collected:  05/30/19 1834    Order Status:  Completed Specimen:  Blood from Peripheral, Antecubital, Right Updated:  06/02/19 1118      Blood Culture, Routine Gram stain nazario bottle: Gram positive cocci      Blood Culture, Routine Results called to and read back by:Kayla Quesada RN 05/31/2019 06:03     Blood Culture, Routine Gram stain aer bottle: Gram positive cocci in chains resembling Strep      Blood Culture, Routine Gram negative rods     Blood Culture, Routine Results called to and read back by: Dane Fajardo RN 05/31/2019  09:46     Blood Culture, Routine --     KLEBSIELLA OXYTOCA  For susceptibility see order # [id number]       Blood Culture, Routine --     STREPTOCOCCUS SALIVARIUS  For susceptibility see order # [id number]            Significant Imaging: I have reviewed all pertinent imaging results/findings within the past 24 hours.

## 2019-06-02 NOTE — SUBJECTIVE & OBJECTIVE
Interval History: Stepped down from ICU last night. Patient reports mild vague abdominal pain but is tolerating PO intake and denies n/v/d. No fevers.     Review of Systems   Constitutional: Negative for chills and fever.   Gastrointestinal: Positive for abdominal pain. Negative for diarrhea, nausea and vomiting.     Objective:     Vital Signs (Most Recent):  Temp: 97.1 °F (36.2 °C) (06/02/19 1118)  Pulse: 71 (06/02/19 1118)  Resp: 18 (06/02/19 1118)  BP: 111/78 (06/02/19 1118)  SpO2: 100 % (06/02/19 0730) Vital Signs (24h Range):  Temp:  [96 °F (35.6 °C)-97.4 °F (36.3 °C)] 97.1 °F (36.2 °C)  Pulse:  [69-72] 71  Resp:  [18-40] 18  SpO2:  [96 %-100 %] 100 %  BP: ()/(53-88) 111/78     Weight: 108.2 kg (238 lb 8.6 oz)  Body mass index is 36.27 kg/m².    Intake/Output Summary (Last 24 hours) at 6/2/2019 1123  Last data filed at 6/2/2019 0600  Gross per 24 hour   Intake 745 ml   Output 3502 ml   Net -2757 ml      Physical Exam   Constitutional: She is oriented to person, place, and time. She appears well-developed and well-nourished. No distress.   HENT:   Head: Normocephalic and atraumatic.   Neck: Normal range of motion. Neck supple.   Cardiovascular: Normal rate and regular rhythm.   Pulmonary/Chest: Effort normal. No respiratory distress. She has no rales.   Abdominal: Soft. Bowel sounds are normal. She exhibits no distension. There is no tenderness. There is no guarding.   Musculoskeletal: Normal range of motion.   Neurological: She is alert and oriented to person, place, and time.   Skin: Skin is warm and dry. She is not diaphoretic.   Psychiatric: She has a normal mood and affect. Her behavior is normal.       Significant Labs:   CBC:   Recent Labs   Lab 06/01/19  0257 06/02/19  0610   WBC 10.97 12.89*   HGB 11.6* 13.2   HCT 37.3 41.0   * 122*     CMP:   Recent Labs   Lab 06/01/19  0257 06/02/19  0644   * 135*   K 5.0 5.1   CL 92* 95   CO2 26 22*   * 159*   BUN 44* 39*   CREATININE 6.6*  5.3*   CALCIUM 7.1* 8.0*   PROT 7.2 8.2   ALBUMIN 2.3* 2.7*   BILITOT 1.0 0.8   ALKPHOS 94 108   * 199*   * 233*   ANIONGAP 17* 18*   EGFRNONAA 6.4* 8.3*       Significant Imaging: I have reviewed all pertinent imaging results/findings within the past 24 hours.

## 2019-06-02 NOTE — CARE UPDATE
Rapid Response Nurse Chart Check     Chart check completed, abnormal VS noted, bedside RN, Warren contacted, no concerns verbalized at this time, instructed to call [phone] for further concerns or assistance.

## 2019-06-02 NOTE — ASSESSMENT & PLAN NOTE
Echo 3/11/19: EF 35% with diastolic dysfunction    Home meds: aspirin, atorvastatin 80mg    - not on GDMT on admission  - BP too low chronically for ACE, BB, etc

## 2019-06-02 NOTE — PROGRESS NOTES
Pharmacokinetic Initial Assessment: IV Vancomycin    Pharmacy previously consulted for vancomcyin; vanc d/c'd but reinitiated this AM for possible bacteremia .  Patient had a vancomycin random ordered this AM which resulted at 25.5 mcg/mL.  Next iHD session not scheduled.      Assessment/Plan:  1.  NO vancomycin today  2.  Obtain daily random concentrations with AM labs until inpatient iHD schedule determined  3.  Redose vancomycin when pre-HD level < 25 or if random concentration falls < 20 mcg/mL  4.  Continue to monitor nephrology's plans for iHD    Pharmacy will continue to follow and monitor vancomycin.      Thank you for the consult,   Angelina Valladares, PharmD, BCPS  [phone]     Patient brief summary:  Sisi Medel is a 58 y.o. female initiated on antimicrobial therapy with IV Vancomycin for treatment of suspected lower respiratory infection    Drug Allergies:   Review of patient's allergies indicates:   Allergen Reactions    Bactrim [sulfamethoxazole-trimethoprim] Other (See Comments)     Causes renal failure    Nsaids (non-steroidal anti-inflammatory drug) Other (See Comments)     Renal failure       Actual Body Weight:   108.2 kg    Renal Function:   Estimated Creatinine Clearance: 15.1 mL/min (A) (based on SCr of 5.3 mg/dL (H)).,     Dialysis Method (if applicable):  intermittent HD, TThSat    CBC (last 72 hours):  Recent Labs   Lab Result Units 05/30/19  1313 05/30/19  1833 05/31/19  0330 06/01/19  0257 06/02/19  0610   WBC K/uL 8.63  --  12.00 10.97 12.89*   Hemoglobin g/dL 12.9  --  12.8 11.6* 13.2   Hemoglobin A1C %  --  6.5*  --   --   --    Hematocrit % 42.4  --  41.2 37.3 41.0   Platelets K/uL 143*  --  120* 144* 122*   Gran% % 78.8*  --  85.9* 87.2* 81.8*   Lymph% % 11.2*  --  4.2* 5.2* 5.1*   Mono% % 8.7  --  8.7 6.6 11.8   Eosinophil% % 0.5  --  0.0 0.0 0.0   Basophil% % 0.1  --  0.2 0.1 0.2   Differential Method  Automated  --  Automated Automated Automated       Metabolic Panel (last  72 hours):  Recent Labs   Lab Result Units 05/30/19  1313 05/31/19  0330 06/01/19  0257 06/02/19  0644   Sodium mmol/L 132* 134* 135* 135*   Potassium mmol/L 3.8 4.5 5.0 5.1   Chloride mmol/L 87* 91* 92* 95   CO2 mmol/L 30* 26 26 22*   Glucose mg/dL 90 144* 120* 159*   BUN, Bld mg/dL 10 19 44* 39*   Creatinine mg/dL 2.9* 4.3* 6.6* 5.3*   Albumin g/dL 2.9* 2.6* 2.3* 2.7*   Total Bilirubin mg/dL 1.6* 1.3* 1.0 0.8   Alkaline Phosphatase U/L 109 105 94 108   AST U/L 94* 187* 243* 199*   ALT U/L 108* 142* 192* 233*       Drug levels (last 3 results):  Recent Labs   Lab Result Units 05/31/19  0330 06/01/19  0257 06/02/19  0610   Vancomycin, Random ug/mL 51.5 26.2 25.5       Microbiologic Results:  Microbiology Results (last 7 days)     Procedure Component Value Units Date/Time    Blood culture [[id number]] Collected:  05/31/19 1234    Order Status:  Completed Specimen:  Blood from Midline, Basilic, Right Updated:  06/01/19 1412     Blood Culture, Routine No Growth to date     Blood Culture, Routine No Growth to date    Narrative:       Please collect with AM labs    Blood culture [[id number]] Collected:  05/31/19 1234    Order Status:  Completed Specimen:  Blood from Midline, Basilic, Right Updated:  06/01/19 1412     Blood Culture, Routine No Growth to date     Blood Culture, Routine No Growth to date    Narrative:       Please collect with AM labs    Blood culture [[id number]] Collected:  05/30/19 1834    Order Status:  Completed Specimen:  Blood from Peripheral, Antecubital, Right Updated:  06/01/19 1239     Blood Culture, Routine Gram stain nazario bottle: Gram positive cocci      Blood Culture, Routine Results called to and read back by:Kayla Quesada RN 05/31/2019 06:03     Blood Culture, Routine Gram stain aer bottle: Gram positive cocci in chains resembling Strep      Blood Culture, Routine Gram negative rods     Blood Culture, Routine Results called to and read back by: Dane Fajardo RN 05/31/2019  09:46     Blood  Culture, Routine --     KLEBSIELLA OXYTOCA  For susceptibility see order # [id number]       Blood Culture, Routine --     STREPTOCOCCUS SALIVARIUS  For susceptibility see order # [id number]      Blood culture [[id number]] Collected:  05/30/19 1833    Order Status:  Completed Specimen:  Blood from AV Graft, Left Updated:  06/01/19 1235     Blood Culture, Routine Gram stain aer bottle: Gram positive cocci in chains resembling Strep     Blood Culture, Routine Gram negative rods     Blood Culture, Routine Gram stain nazario bottle: Gram positive cocci in chains resembling Strep     Blood Culture, Routine Results called to and read back by: Dane Fajardo RN 05/31/2019  09:48     Blood Culture, Routine --     KLEBSIELLA OXYTOCA  Susceptibility pending       Blood Culture, Routine --     STREPTOCOCCUS SALIVARIUS  Susceptibility pending

## 2019-06-02 NOTE — HOSPITAL COURSE
Ms. Medel was initially admitted to hospital medicine on 5/30 for increased work of breathing and pulmonary edema thought to be secondary to insufficient removal of fluid in outpatient dialysis. She was dialyzed in the ED, however her work of breathing significantly increased and she was admitted to the ICU. Her blood cultures drawn on admission grew Klebsiella oxytoca and Streptococcus salivarius, and she was started on IV Vancomycin and Zosyn by the ICU team. Vancomycin was discontinued on 6/2.  Her respiratory status improved after admission to the ICU and further dialysis, and she was stepped down to hospital medicine on 6/2. A CT Chest/abd/pelvis was ordered to evaluate for source of bacteremia but was unremarkable. Infectious disease was consulted on 6/2 and recommended two week course of ceftriaxone. She had clinically improved and will go to SNF and continue to get IV antibiotics which ends at 6/12/19

## 2019-06-02 NOTE — PROGRESS NOTES
[facility] Hospital Medicine  Progress Note    Patient Name: Sisi Medel  MRN: [id number]  Patient Class: IP- Inpatient   Admission Date: 5/30/2019  Length of Stay: 3 days  Attending Physician: Jeanne Ortega MD  Primary Care Provider: Carlos A Caputo MD    Hospital Medicine Team: [facility]OSP MED 5 Ivy Vaughn DO    Subjective:     Principal Problem:Bacteremia    HPI:  Ms. Medel is a 58 year old lady with a past medical history of pulmonary sarcoidosis on 3-5L home oxygen, Class 3 HFrEF (3/2019 EF 35%), perm Afib/flutter s/p 6/2017 AVN ablation & PPM, ESRD (TTS), Type 2 DM (3/2019 A1c 6.2%), pulmonary HTN, YOANDY, and orthostatic hypotension who presented to [facility] ED with complaints of shortness of breath. The patient went to dialysis today and apparently could not get down to her dry weight, per neprhology should be 111kg but patient only got to 112. Per the patient her BP's were fine and she did not have any trouble with the dialysis. Upon returning to the car the patient had worsening shortness of breath and had to increase O2 to 5L. Per the patient and her family these symptoms have been persistent for several weeks. She was discharged from [facility] yesterday (5/29/19) following a 2 day admission for similar complaints. At that time the patient needed dialysis for volume overload and new dry weight was established.    Of note patient has had frequent hospitalizations in the past. These admissions are usually for volume overload, CHF, and shortness of breath. She follows with pulmonology as an outpatient and is on chronic steroids. Per last outpatient note, continued on prednisone 20mg daily for sarcoid as she has had difficulty and increased hospitalizations on lower doses.    In the ED the patient was on BiPAP, chest Xray showing significant pulmonary edema. Nephology was consulted and started dialysis in room. On evaluation patient was uncomfortable on bipap but no  increased work of breathing noted. Patient endorsing neck pain from the bipap and leg pain. She denied recent fevers or chills, but did endorse a chronic cough that had been worsening recently.    Hospital Course:  Ms. Medel was initially admitted to hospital medicine on 5/30 for increased work of breathing and pulmonary edema thought to be secondary to insufficient removal of fluid in outpatient dialysis. She was dialyzed in the ED, however her work of breathing significantly increased and she was admitted to the ICU. Her blood cultures drawn on admission grew Klebsiella oxytoca and Streptococcus salivarius, and she was started on IV Vancomycin and Zosyn by the ICU team. Vancomycin was discontinued on 6/2.  Her respiratory status improved after admission to the ICU and further dialysis, and she was stepped down to hospital medicine on 6/2. A CT Chest/abd/pelvis was ordered to evaluate for source of bacteremia but was unremarkable. Infectious disease was consulted on 6/2.     Interval History: Stepped down from ICU last night. Patient reports mild vague abdominal pain but is tolerating PO intake and denies n/v/d. No fevers.     Review of Systems   Constitutional: Negative for chills and fever.   Gastrointestinal: Positive for abdominal pain. Negative for diarrhea, nausea and vomiting.     Objective:     Vital Signs (Most Recent):  Temp: 97.1 °F (36.2 °C) (06/02/19 1118)  Pulse: 71 (06/02/19 1118)  Resp: 18 (06/02/19 1118)  BP: 111/78 (06/02/19 1118)  SpO2: 100 % (06/02/19 0730) Vital Signs (24h Range):  Temp:  [96 °F (35.6 °C)-97.4 °F (36.3 °C)] 97.1 °F (36.2 °C)  Pulse:  [69-72] 71  Resp:  [18-40] 18  SpO2:  [96 %-100 %] 100 %  BP: ()/(53-88) 111/78     Weight: 108.2 kg (238 lb 8.6 oz)  Body mass index is 36.27 kg/m².    Intake/Output Summary (Last 24 hours) at 6/2/2019 1123  Last data filed at 6/2/2019 0600  Gross per 24 hour   Intake 745 ml   Output 3502 ml   Net -2757 ml      Physical Exam    Constitutional: She is oriented to person, place, and time. She appears well-developed and well-nourished. No distress.   HENT:   Head: Normocephalic and atraumatic.   Neck: Normal range of motion. Neck supple.   Cardiovascular: Normal rate and regular rhythm.   Pulmonary/Chest: Effort normal. No respiratory distress. She has no rales.   Abdominal: Soft. Bowel sounds are normal. She exhibits no distension. There is no tenderness. There is no guarding.   Musculoskeletal: Normal range of motion.   Neurological: She is alert and oriented to person, place, and time.   Skin: Skin is warm and dry. She is not diaphoretic.   Psychiatric: She has a normal mood and affect. Her behavior is normal.       Significant Labs:   CBC:   Recent Labs   Lab 06/01/19 0257 06/02/19  0610   WBC 10.97 12.89*   HGB 11.6* 13.2   HCT 37.3 41.0   * 122*     CMP:   Recent Labs   Lab 06/01/19 0257 06/02/19  0644   * 135*   K 5.0 5.1   CL 92* 95   CO2 26 22*   * 159*   BUN 44* 39*   CREATININE 6.6* 5.3*   CALCIUM 7.1* 8.0*   PROT 7.2 8.2   ALBUMIN 2.3* 2.7*   BILITOT 1.0 0.8   ALKPHOS 94 108   * 199*   * 233*   ANIONGAP 17* 18*   EGFRNONAA 6.4* 8.3*       Significant Imaging: I have reviewed all pertinent imaging results/findings within the past 24 hours.    Assessment/Plan:      * Bacteremia  Blood cultures: Klebsiella oxytoca and Streptococcus salivarius. Repeat BCx NGTD    CT Chest/Abd/Pelvis: Symmetric central lung hazy and streaky opacities are consistent with sarcoidosis similar with 03/07/2016.  There is probably superimposed subsegmental atelectasis at the bases.  No current adenopathy. No intra-abdominal inflammatory process    - will continue with IV zosyn for now, discontinue vancomycin  - infectious disease consult to help with antibiotic choice and duration, ensure no missed source of infection  - continue with stress dose steroids for now      Acute on chronic respiratory failure with hypoxia and  hypercapnia  Resolved after dialysis and stay in ICU      Acute on chronic combined systolic and diastolic heart failure  Echo 3/11/19: EF 35% with diastolic dysfunction    Home meds: aspirin, atorvastatin 80mg    - not on GDMT on admission  - BP too low chronically for ACE, BB, etc      Secondary adrenal insufficiency  Home meds: prednisone 10mg daily, fludrocortisone, midodrine    - will continue with stress dose steroids for now  - continue fludrocortisone, midodrine      ESRD on hemodialysis  Nephrology consulted    - HD per nephro  - daily renal labs        Pulmonary sarcoidosis    On 4L O2 chronically    - will need outpatient follow up with pulmonology    Paroxysmal A-fib  Home med: warfarin   INR: 3.5 on 6/25    - continue to hold warfarin  - check daily INR, restart warfarin when appropriate      Diabetes mellitus, type 2  Home med: tradjenta  A1c: 6.5    - has only had minimal insulin requirements so far  - SSI, will add on detemir if consistently requires insulin  - diabetic diet  - hypoglycemic protocol      Epilepsy  Home med: keppra 500mg bid    - continue      Wound of right leg  - wound care consult      Transaminitis  Suspect secondary to sepsis, has been downtrending. Biliary duct appeared normal on CT abd      Discharge planning issues  PT/OT consulted    - home health        VTE Risk Mitigation (From admission, onward)        Ordered     IP VTE HIGH RISK PATIENT  Once      05/30/19 9506              Ivy Vaughn DO  Department of Hospital Medicine   [facility]

## 2019-06-02 NOTE — CARE UPDATE
Rapid Response Nurse Follow-up Note     Followed up with patient for proactive rounding.   No acute issues at this time. Reviewed plan of care with primary RN, Zarina.   Please call Rapid Response RN, Jane Balbuena RN with any questions or concerns at [phone].

## 2019-06-03 PROBLEM — J96.22 ACUTE ON CHRONIC RESPIRATORY FAILURE WITH HYPOXIA AND HYPERCAPNIA: Status: RESOLVED | Noted: 2019-01-01 | Resolved: 2019-01-01

## 2019-06-03 PROBLEM — J96.21 ACUTE ON CHRONIC RESPIRATORY FAILURE WITH HYPOXIA AND HYPERCAPNIA: Status: RESOLVED | Noted: 2019-01-01 | Resolved: 2019-01-01

## 2019-06-03 NOTE — PLAN OF CARE
Problem: Fall Injury Risk  Goal: Absence of Fall and Fall-Related Injury  Outcome: Revised  Witnessed Fall 6/3

## 2019-06-03 NOTE — PLAN OF CARE
Pt not seen today, off floor for HD. Discussed w/ primary this AM, plans for d/c today. Recommend either ceftriaxone 2g daily, or alternatively, cefepime 2g to be dosed during HD. End of therapy 6/14/2019 for either regimen.    Please call w/ questions. Will sign off.      Varsha Lozano DO  Infectious Disease Fellow  P: [phone]

## 2019-06-03 NOTE — PLAN OF CARE
Problem: Occupational Therapy Goal  Goal: Occupational Therapy Goal  Goals to be met by: 6/10/2019    Patient will increase functional independence with ADLs by performing:    UE Dressing with [place].  LE Dressing with [place].  Grooming while standing with [place].  Toileting from toilet with [place] for hygiene and clothing management.      Outcome: Ongoing (interventions implemented as appropriate)  Goals addressed and unmet.  Cont with POC  Taty Barahona OT  6/3/2019

## 2019-06-03 NOTE — PLAN OF CARE
Problem: Fall Injury Risk  Goal: Absence of Fall and Fall-Related Injury  Outcome: Ongoing (interventions implemented as appropriate)  Meds given as ordered toelrated well. Prn pain meds given as needed. Fall earlier but denies injuries. No signs or symptoms of distress/discomfort noted. Telesitter in place monitoring pt. Bedside commode in room. Vitals stable. Safety maintained. Will continue to monitor.

## 2019-06-03 NOTE — PLAN OF CARE
Problem: Infection (Hemodialysis)  Goal: Absence of Infection Signs/Symptoms    Intervention: Prevent or Manage Infection  Pt receiving dialysis today , tolerating well ..

## 2019-06-03 NOTE — SUBJECTIVE & OBJECTIVE
Interval History: Patient with no complaints this morning. Eating and drinking well. Due for dialysis today.    Review of Systems   Constitutional: Negative for chills and fever.   Gastrointestinal: Negative for abdominal pain, diarrhea, nausea and vomiting.     Objective:     Vital Signs (Most Recent):  Temp: 97.3 °F (36.3 °C) (06/03/19 1203)  Pulse: 69 (06/03/19 1203)  Resp: 20 (06/03/19 1203)  BP: 107/72 (06/03/19 1203)  SpO2: 99 % (06/03/19 1203) Vital Signs (24h Range):  Temp:  [97.3 °F (36.3 °C)-98 °F (36.7 °C)] 97.3 °F (36.3 °C)  Pulse:  [67-70] 69  Resp:  [18-20] 20  SpO2:  [96 %-100 %] 99 %  BP: (104-122)/(71-90) 107/72     Weight: 108.2 kg (238 lb 8.6 oz)  Body mass index is 36.27 kg/m².  No intake or output data in the 24 hours ending 06/03/19 1305   Physical Exam   Constitutional: She is oriented to person, place, and time. She appears well-developed and well-nourished. No distress.   HENT:   Head: Normocephalic and atraumatic.   Neck: Normal range of motion. Neck supple.   Cardiovascular: Normal rate and regular rhythm.   Pulmonary/Chest: Effort normal. No respiratory distress. She has no rales.   Abdominal: Soft. Bowel sounds are normal. She exhibits no distension. There is no tenderness. There is no guarding.   Musculoskeletal: Normal range of motion.   Neurological: She is alert and oriented to person, place, and time.   Skin: Skin is warm and dry. She is not diaphoretic.   Psychiatric: She has a normal mood and affect. Her behavior is normal.

## 2019-06-03 NOTE — PT/OT/SLP PROGRESS
Occupational Therapy   Treatment    Name: Sisi Medel  MRN: [id number]  Admitting Diagnosis:  Bacteremia       Recommendations:     Discharge Recommendations: home with home health  Discharge Equipment Recommendations:  none  Barriers to discharge:  None    Assessment:     Sisi Medel is a 58 y.o. female with a medical diagnosis of Bacteremia.  She presents supine in bed and on phone with her sister who desires pt to go to rehab.  Explained process and therapists had rec HH.  Pt with admitted poor bed mobility and with limited assist available to home. Discussed with treating PT who states pt able to demonstrate functional performance. Upon discussion MD signed DC orders for home.   Performance deficits affecting function are weakness, impaired endurance, impaired self care skills, impaired functional mobilty.     Rehab Prognosis:  Good; patient would benefit from acute skilled OT services to address these deficits and reach maximum level of function.       Plan:     Patient to be seen 3 x/week to address the above listed problems via self-care/home management, therapeutic activities, therapeutic exercises, sensory integration  · Plan of Care Expires: 07/01/19  · Plan of Care Reviewed with: patient    Subjective     Pain/Comfort:  · Pain Rating 1: 0/10    Objective:     Communicated with: RN prior to session.  Patient found supine with oxygen, telemetry upon OT entry to room.    General Precautions: Standard, fall   Orthopedic Precautions:N/A   Braces: N/A     Occupational Performance:     Bed Mobility:    · Patient completed Rolling/Turning to Left with  stand by assistance  · Patient completed Rolling/Turning to Right with stand by assistance  · Patient completed Scooting/Bridging with stand by assistance  · Patient completed Supine to Sit with stand by assistance  · Patient completed Sit to Supine with stand by assistance     Activities of Daily Living:  · Feeding:  independence    · Grooming:  modified independence set up      [facility] 6 Click ADL: 19    Treatment & Education:  Pt educated on safety, role of OT, importance of increased participation in self care for gains , expectations for participation, expectations for gains, POC, energy conservation, caregiver strain. White board updated.   - bed mobility training  - UE therex   - pt and family education for safety     Patient left supine with all lines intactEducation:      GOALS:   Multidisciplinary Problems     Occupational Therapy Goals        Problem: Occupational Therapy Goal    Goal Priority Disciplines Outcome Interventions   Occupational Therapy Goal     OT, PT/OT Ongoing (interventions implemented as appropriate)    Description:  Goals to be met by: 6/10/2019    Patient will increase functional independence with ADLs by performing:    UE Dressing with [place].  LE Dressing with [place].  Grooming while standing with [place].  Toileting from toilet with [place] for hygiene and clothing management.                       Time Tracking:     OT Date of Treatment: 06/03/19  OT Start Time: 1025  OT Stop Time: 1050  OT Total Time (min): 25 min    Billable Minutes:Therapeutic Activity 25    Taty Barahona, GRETTA  6/3/2019

## 2019-06-03 NOTE — PLAN OF CARE
[facility]    HOME HEALTH ORDERS  FACE TO FACE ENCOUNTER    Patient Name: Sisi Medel  YOB: 1960    PCP: Carlos A Caputo MD   PCP Address: [address] / [place] [address]  PCP Phone Number: [phone]  PCP Fax: [phone]    Encounter Date: 06/03/2019    Admit to Home Health    Diagnoses:  Active Hospital Problems    Diagnosis  POA    *Bacteremia [R78.81]  Yes     Priority: 1 - High    Acute on chronic respiratory failure with hypoxia and hypercapnia [J96.21, J96.22]  Yes     Priority: 2     Acute on chronic combined systolic and diastolic heart failure [I50.43]  Yes     Priority: 2     Secondary adrenal insufficiency [E27.49]  Yes     Priority: 2     ESRD on hemodialysis [N18.6, Z99.2]  Not Applicable     Priority: 3     Pulmonary sarcoidosis [D86.0]  Yes     Priority: 3     Paroxysmal A-fib [I48.0]  Yes     Priority: 4     Diabetes mellitus, type 2 [E11.9]  Yes     Priority: 5     Epilepsy [G40.909]  Yes     Priority: 5     Wound of right leg [S81.801A]  Yes     Priority: 6     Transaminitis [R74.0]  Yes     Priority: 7     Other streptococcal sepsis [A40.8]  Yes    Discharge planning issues [Z02.9]  Not Applicable      Resolved Hospital Problems    Diagnosis Date Resolved POA    Encephalopathy [G93.40] 06/01/2019 Yes       Future Appointments   Date Time Provider Department Center   6/3/2019 12:15 PM DIALYSIS, [facility] DLYS [facility]   6/7/2019  8:00 AM Dorita Haywood NP [facility] WOUND [facility]   6/7/2019  9:45 AM Claudia Ron DPM [facility] POD [facility]   6/14/2019  8:00 AM Emeka Lester MD [facility] NEURO [facility]   7/1/2019  9:00 AM Annette Hernandez NP [facility] ENDODIA [facility]   7/2/2019  8:00 AM HOME MONITOR DEVICE CHECK, [facility] ARRHPRO [facility]   7/5/2019  9:00 AM Carlos A Caputo MD [facility] IM [facility] PCW   10/1/2019  8:00 AM HOME MONITOR DEVICE CHECK, [facility] A[address]EN [address]           I have seen and examined this patient  face to face today. My clinical findings that support the need for the home health skilled services and home bound status are the following:  Weakness/numbness causing balance and gait disturbance due to Weakness/Debility making it taxing to leave home.    Allergies:  Review of patient's allergies indicates:   Allergen Reactions    Bactrim [sulfamethoxazole-trimethoprim] Other (See Comments)     Causes renal failure    Nsaids (non-steroidal anti-inflammatory drug) Other (See Comments)     Renal failure       Diet: renal diet    Activities: activity as tolerated    Nursing:   SN to complete comprehensive assessment including routine vital signs. Instruct on disease process and s/s of complications to report to MD. Review/verify medication list sent home with the patient at time of discharge  and instruct patient/caregiver as needed. Frequency may be adjusted depending on start of care date.    Notify MD if SBP > 160 or < 90; DBP > 90 or < 50; HR > 120 or < 50; Temp > 101; Other:         CONSULTS:    Physical Therapy to evaluate and treat. Evaluate for home safety and equipment needs; Establish/upgrade home exercise program. Perform / instruct on therapeutic exercises, gait training, transfer training, and Range of Motion.  Occupational Therapy to evaluate and treat. Evaluate home environment for safety and equipment needs. Perform/Instruct on transfers, ADL training, ROM, and therapeutic exercises.    MISCELLANEOUS CARE:  Home Infusion Therapy:   SN to perform Infusion Therapy/Central Line Care.  Review Central Line Care & Central Line Flush with patient.    Administer (drug and dose): ceftriaxone 2g q24h    Last dose given: 6/2 1739hrs                         Home dose due: 6/3 1800hrs  cefTRIAXone 2 g in dextrose 5 % 50 mL (ROCEPHIN) 2 g/50 mL PgBk IVPB Inject 50 mLs (2 g total) into the vein once daily. END DATE: 6/14/2019 for 14 days  Qty: 14 each, Refills: 0          Scrub the Hub: Prior to accessing the line,  always perform a 30 second alcohol scrub  Each lumen of the central line is to be flushed at least daily with 10 mL Normal Saline and 3 mL Heparin flush (10 units/mL)  Skilled Nurse (SN) may draw blood from IV access  Blood Draw Procedure:   - Aspirate at least 5 mL of blood   - Discard   - Obtain specimen   - Change injection cap   - Flush with 20 mL Normal Saline followed by a                 3-5 mL Heparin flush (10 units/mL)  Central :   - Sterile dressing changes are done weekly and as needed.   - Use chlor-hexadine scrub to cleanse site, apply Biopatch to insertion site,       apply securement device dressing   - Injection caps are changed weekly and after EVERY lab draw.   - If sterile gauze is under dressing to control oozing,                 dressing change must be performed every 24 hours until gauze is not needed.    WOUND CARE ORDERS  yes:  Neuropathic Wound:  Location: bilateral lower legs    Consult ET nurse        Apply the following to wound:   Collagenase (Santyl) daily, cover with telfa, wrap with with kerlix dressing      Medications: Review discharge medications with patient and family and provide education.      Current Discharge Medication List      START taking these medications    Details   cefTRIAXone 2 g in dextrose 5 % 50 mL (ROCEPHIN) 2 g/50 mL PgBk IVPB Inject 50 mLs (2 g total) into the vein once daily. END DATE: 6/14/2019 for 14 days  Qty: 14 each, Refills: 0         CONTINUE these medications which have NOT CHANGED    Details   aspirin (ECOTRIN) 81 MG EC tablet Take 81 mg by mouth once daily.      atorvastatin (LIPITOR) 80 MG tablet TAKE 1 TABLET BY MOUTH EVERY EVENING  Qty: 90 tablet, Refills: 1    Associated Diagnoses: Chronic combined systolic and diastolic heart failure      benzonatate (TESSALON) 100 MG capsule Take 1 capsule (100 mg total) by mouth 3 (three) times daily. for 10 days  Qty: 30 capsule, Refills: 0      blood sugar diagnostic Strp 1 each by  Misc.(Non-Drug; Combo Route) route once daily.  Qty: 100 each, Refills: 3    Comments: Please provide test strips covered by patient's insurance  Associated Diagnoses: Uncontrolled type 2 diabetes mellitus with hyperglycemia      cinacalcet (SENSIPAR) 30 MG Tab Take 30 mg by mouth daily with breakfast.      clopidogrel (PLAVIX) 75 mg tablet TAKE 1 TABLET(75 MG) BY MOUTH EVERY DAY  Qty: 90 tablet, Refills: 0    Comments: **Patient requests 90 days supply**      fludrocortisone (FLORINEF) 0.1 mg Tab Take 1 tablet (100 mcg total) by mouth 2 (two) times daily.  Qty: 60 tablet, Refills: 5      gabapentin (NEURONTIN) 600 MG tablet Take 600 mg by mouth 2 (two) times daily.       lancets Misc 1 each by Misc.(Non-Drug; Combo Route) route once daily.  Qty: 100 each, Refills: 3    Comments: Please provide lancets covered by patient's insurance  Associated Diagnoses: Uncontrolled type 2 diabetes mellitus with hyperglycemia      levETIRAcetam (KEPPRA) 500 MG Tab TAKE 1 TABLET BY MOUTH TWICE DAILY  Qty: 180 tablet, Refills: 3    Associated Diagnoses: Seizure disorder      midodrine (PROAMATINE) 10 MG tablet Take 1 tablet (10 mg total) by mouth 3 (three) times daily.  Qty: 90 tablet, Refills: 11      nortriptyline (PAMELOR) 25 MG capsule Take 1 capsule by mouth nightly  Refills: 1      omeprazole (PRILOSEC) 20 MG capsule TAKE 1 CAPSULE BY MOUTH EVERY DAY  Qty: 90 capsule, Refills: 4    Comments: **Patient requests 90 days supply**  Associated Diagnoses: Gastroesophageal reflux disease with esophagitis      patiromer calcium sorbitex (VELTASSA) 16.8 gram PwPk Take 33.6 g by mouth once daily.       prednisoLONE acetate (PRED FORTE) 1 % DrpS INSTILL ONE DROP INTO  LEFT EYE THREE TIMES A DAY  Refills: 3      predniSONE (DELTASONE) 20 MG tablet Take 0.5 tablets (10 mg total) by mouth once daily.  Qty: 30 tablet, Refills: 2      pregabalin (LYRICA) 100 MG capsule Take 1 capsule (100 mg total) by mouth 3 (three) times daily.  Qty: 270  capsule, Refills: 1    Associated Diagnoses: Polyneuropathy associated with underlying disease      PROLENSA 0.07 % Drop INSTILL 1 DROP IN LEFT / RIGHT EYE QD  Refills: 12      DENISE-LINDA 0.8 mg Tab TAKE 1 TABLET BY MOUTH ONCE DAILY  Refills: 0      RENVELA 800 mg Tab TAKE 1 TABLET BY MOUTH THREE TIMES DAILY WITH MEALS  Qty: 90 tablet, Refills: 0      tiZANidine (ZANAFLEX) 4 MG tablet TAKE 1 TABLET BY MOUTH EVERY DAY AS NEEDED FOR MUSCLE SPASMS  Qty: 90 tablet, Refills: 0    Associated Diagnoses: Muscle spasm      TRADJENTA 5 mg Tab tablet TAKE 1 TABLET(5 MG) BY MOUTH EVERY DAY  Qty: 90 tablet, Refills: 1    Associated Diagnoses: Controlled type 2 diabetes mellitus with complication, without long-term current use of insulin      warfarin (COUMADIN) 5 MG tablet TAKE 1 TABLET BY MOUTH EVERY DAY EXCEPT TH 1AND 1/2 TABLETS ON MONDAY AND FRIDAY OR AS DIRECTED  Qty: 120 tablet, Refills: 0    Comments: **Patient requests 90 days supply**  Associated Diagnoses: Long term current use of anticoagulant therapy; Paroxysmal atrial fibrillation         STOP taking these medications       sodium polystyrene (KAYEXALATE) powder Comments:   Reason for Stopping:               I certify that this patient is confined to her home and needs intermittent skilled nursing care, physical therapy and occupational therapy.

## 2019-06-03 NOTE — PROGRESS NOTES
[facility] Hospital Medicine  Progress Note    Patient Name: Sisi Medel  MRN: [id number]  Patient Class: IP- Inpatient   Admission Date: 5/30/2019  Length of Stay: 4 days  Attending Physician: Jeanne Ortega MD  Primary Care Provider: Carlos A Caputo MD    Hospital Medicine Team: [facility] MED 5 Ivy Vaughn DO    Subjective:     Principal Problem:Bacteremia    HPI:  Ms. Medel is a 58 year old lady with a past medical history of pulmonary sarcoidosis on 3-5L home oxygen, Class 3 HFrEF (3/2019 EF 35%), perm Afib/flutter s/p 6/2017 AVN ablation & PPM, ESRD (TTS), Type 2 DM (3/2019 A1c 6.2%), pulmonary HTN, YOANDY, and orthostatic hypotension who presented to [facility] ED with complaints of shortness of breath. The patient went to dialysis today and apparently could not get down to her dry weight, per neprhology should be 111kg but patient only got to 112. Per the patient her BP's were fine and she did not have any trouble with the dialysis. Upon returning to the car the patient had worsening shortness of breath and had to increase O2 to 5L. Per the patient and her family these symptoms have been persistent for several weeks. She was discharged from [facility] yesterday (5/29/19) following a 2 day admission for similar complaints. At that time the patient needed dialysis for volume overload and new dry weight was established.    Of note patient has had frequent hospitalizations in the past. These admissions are usually for volume overload, CHF, and shortness of breath. She follows with pulmonology as an outpatient and is on chronic steroids. Per last outpatient note, continued on prednisone 20mg daily for sarcoid as she has had difficulty and increased hospitalizations on lower doses.    In the ED the patient was on BiPAP, chest Xray showing significant pulmonary edema. Nephology was consulted and started dialysis in room. On evaluation patient was uncomfortable on bipap but no  increased work of breathing noted. Patient endorsing neck pain from the bipap and leg pain. She denied recent fevers or chills, but did endorse a chronic cough that had been worsening recently.    Hospital Course:  Ms. Medel was initially admitted to hospital medicine on 5/30 for increased work of breathing and pulmonary edema thought to be secondary to insufficient removal of fluid in outpatient dialysis. She was dialyzed in the ED, however her work of breathing significantly increased and she was admitted to the ICU. Her blood cultures drawn on admission grew Klebsiella oxytoca and Streptococcus salivarius, and she was started on IV Vancomycin and Zosyn by the ICU team. Vancomycin was discontinued on 6/2.  Her respiratory status improved after admission to the ICU and further dialysis, and she was stepped down to hospital medicine on 6/2. A CT Chest/abd/pelvis was ordered to evaluate for source of bacteremia but was unremarkable. Infectious disease was consulted on 6/2 and recommended two week course of ceftriaxone.     Interval History: Patient with no complaints this morning. Eating and drinking well. Due for dialysis today.     Review of Systems   Constitutional: Negative for chills and fever.   Gastrointestinal: Negative for abdominal pain, diarrhea, nausea and vomiting.      Objective:      Vital Signs (Most Recent):  Temp: 97.3 °F (36.3 °C) (06/03/19 1203)  Pulse: 69 (06/03/19 1203)  Resp: 20 (06/03/19 1203)  BP: 107/72 (06/03/19 1203)  SpO2: 99 % (06/03/19 1203) Vital Signs (24h Range):  Temp:  [97.3 °F (36.3 °C)-98 °F (36.7 °C)] 97.3 °F (36.3 °C)  Pulse:  [67-70] 69  Resp:  [18-20] 20  SpO2:  [96 %-100 %] 99 %  BP: (104-122)/(71-90) 107/72      Weight: 108.2 kg (238 lb 8.6 oz)  Body mass index is 36.27 kg/m².  No intake or output data in the 24 hours ending 06/03/19 1305   Physical Exam   Constitutional: She is oriented to person, place, and time. She appears well-developed and well-nourished. No  distress.   HENT:   Head: Normocephalic and atraumatic.   Neck: Normal range of motion. Neck supple.   Cardiovascular: Normal rate and regular rhythm.   Pulmonary/Chest: Effort normal. No respiratory distress. She has no rales.   Abdominal: Soft. Bowel sounds are normal. She exhibits no distension. There is no tenderness. There is no guarding.   Musculoskeletal: Normal range of motion.   Neurological: She is alert and oriented to person, place, and time.   Skin: Skin is warm and dry. She is not diaphoretic.   Psychiatric: She has a normal mood and affect. Her behavior is normal.     Recent Labs   Lab 06/03/19  0416   WBC 12.48   RBC 4.92   HGB 12.6   HCT 42.4   *   MCV 86   MCH 25.6*   MCHC 29.7*     CMP  Sodium   Date Value Ref Range Status   06/03/2019 131 (L) 136 - 145 mmol/L Final     Potassium   Date Value Ref Range Status   06/03/2019 5.2 (H) 3.5 - 5.1 mmol/L Final     Chloride   Date Value Ref Range Status   06/03/2019 91 (L) 95 - 110 mmol/L Final     CO2   Date Value Ref Range Status   06/03/2019 22 (L) 23 - 29 mmol/L Final     Glucose   Date Value Ref Range Status   06/03/2019 107 70 - 110 mg/dL Final     BUN, Bld   Date Value Ref Range Status   06/03/2019 59 (H) 6 - 20 mg/dL Final     Creatinine   Date Value Ref Range Status   06/03/2019 6.9 (H) 0.5 - 1.4 mg/dL Final     Calcium   Date Value Ref Range Status   06/03/2019 7.2 (L) 8.7 - 10.5 mg/dL Final     Total Protein   Date Value Ref Range Status   06/03/2019 7.2 6.0 - 8.4 g/dL Final     Albumin   Date Value Ref Range Status   06/03/2019 2.5 (L) 3.5 - 5.2 g/dL Final     Total Bilirubin   Date Value Ref Range Status   06/03/2019 0.6 0.1 - 1.0 mg/dL Final     Comment:     For infants and newborns, interpretation of results should be based  on gestational age, weight and in agreement with clinical  observations.  Premature Infant recommended reference ranges:  Up to 24 hours.............<8.0 mg/dL  Up to 48 hours............<12.0 mg/dL  3-5  days..................<15.0 mg/dL  6-29 days.................<15.0 mg/dL       Alkaline Phosphatase   Date Value Ref Range Status   06/03/2019 89 55 - 135 U/L Final     AST   Date Value Ref Range Status   06/03/2019 88 (H) 10 - 40 U/L Final     ALT   Date Value Ref Range Status   06/03/2019 159 (H) 10 - 44 U/L Final     Anion Gap   Date Value Ref Range Status   06/03/2019 18 (H) 8 - 16 mmol/L Final     eGFR if    Date Value Ref Range Status   06/03/2019 7.0 (A) >60 mL/min/1.73 m^2 Final     eGFR if non    Date Value Ref Range Status   06/03/2019 6.1 (A) >60 mL/min/1.73 m^2 Final     Comment:     Calculation used to obtain the estimated glomerular filtration  rate (eGFR) is the CKD-EPI equation.            Assessment/Plan:      * Bacteremia  Blood cultures: Klebsiella oxytoca and Streptococcus salivarius. Repeat BCx NGTD    CT Chest/Abd/Pelvis: Symmetric central lung hazy and streaky opacities are consistent with sarcoidosis similar with 03/07/2016.  There is probably superimposed subsegmental atelectasis at the bases.  No current adenopathy. No intra-abdominal inflammatory process    - IV ceftriaxone x2 weeks per ID, appreciate their recommendations  - midline catheter in place  - no source identified, ? Gastrointestinal translocation      Acute on chronic combined systolic and diastolic heart failure  Echo 3/11/19: EF 35% with diastolic dysfunction    Home meds: aspirin, atorvastatin 80mg    - not on GDMT on admission  - BP too low chronically for ACE, BB, etc      Secondary adrenal insufficiency  Home meds: prednisone 10mg daily, fludrocortisone, midodrine    - de-escalate to home steroid dose. No need for taper with only 4 days of high dose steroids  - continue fludrocortisone, midodrine      ESRD on hemodialysis  Nephrology consulted    - HD per nephro  - daily renal labs        Pulmonary sarcoidosis    On 4L O2 chronically    - will need outpatient follow up with  pulmonology    Paroxysmal A-fib  Home med: warfarin   INR: 2.8 on 6/3    - resume home dosing of warfarin      Diabetes mellitus, type 2  Home med: tradjenta  A1c: 6.5    - resume tradjenta on discharge      Epilepsy  Home med: keppra 500mg bid    - continue      Wound of right leg  -  wound care      Transaminitis  Suspect secondary to sepsis, has been downtrending. Biliary duct appeared normal on CT abd      Discharge planning issues  PT/OT consulted    - [facility] for PT/OT and IV abx      Other streptococcal sepsis          VTE Risk Mitigation (From admission, onward)        Ordered     warfarin (COUMADIN) tablet 5 mg  Daily      06/03/19 0804     IP VTE HIGH RISK PATIENT  Once      05/30/19 1805              Ivy Vaughn DO  Department of Hospital Medicine   [facility]

## 2019-06-03 NOTE — PROGRESS NOTES
5/30/19 - 6/1/19 admitted for bacteremia, staff message sent by Ivette Finney PharmD that patient will be discharged today on IV Ceftriaxone 2 gram daily for 2 weeks and plan to discharge her on her home dose Coumadin, calendar updated on dose given during admit.  (Patient had prior admit 5/27/19 -5/29/19 and order was faxed to have HH do INR 6/4/19) chart routed to pharmd to review.    I spoke to the patient and instructed her to take 1 tab daily 6/3/19 - 6/6/19 with next INR on 6/6/19 by HH.  Order faxed to HH

## 2019-06-03 NOTE — PLAN OF CARE
Problem: Physical Therapy Goal  Goal: Physical Therapy Goal  Goals to be met by: 6/15/19    Patient will increase functional independence with mobility by performin. Supine to sit with Stand-by Assistance -not met  2. Sit to stand transfer with Supervision with AD -not met  3. Gait  x 150 feet with Supervision using Rolling Walker. -not met  4. Ascend/descend 5 stair with right Handrails Contact Guard Assistance  -not met     Outcome: Ongoing (interventions implemented as appropriate)  Goals remain appropriate.

## 2019-06-03 NOTE — DISCHARGE SUMMARY
[facility] Hospital Medicine  Discharge Summary      Patient Name: Sisi Medel  MRN: [id number]  Admission Date: 5/30/2019  Hospital Length of Stay: 4 days  Discharge Date and Time:  06/03/2019 1:13 PM  Attending Physician: Jeanne Ortega MD   Discharging Provider: Ivy Vaughn DO  Primary Care Provider: Carlos A Caputo MD  Hospital Medicine Team: [facility] HOSP MED 5 Ivy Vaughn DO    HPI:   Ms. Medel is a 58 year old lady with a past medical history of pulmonary sarcoidosis on 3-5L home oxygen, Class 3 HFrEF (3/2019 EF 35%), perm Afib/flutter s/p 6/2017 AVN ablation & PPM, ESRD (TTS), Type 2 DM (3/2019 A1c 6.2%), pulmonary HTN, YOANDY, and orthostatic hypotension who presented to [facility] ED with complaints of shortness of breath. The patient went to dialysis today and apparently could not get down to her dry weight, per neprhology should be 111kg but patient only got to 112. Per the patient her BP's were fine and she did not have any trouble with the dialysis. Upon returning to the car the patient had worsening shortness of breath and had to increase O2 to 5L. Per the patient and her family these symptoms have been persistent for several weeks. She was discharged from [facility] yesterday (5/29/19) following a 2 day admission for similar complaints. At that time the patient needed dialysis for volume overload and new dry weight was established.    Of note patient has had frequent hospitalizations in the past. These admissions are usually for volume overload, CHF, and shortness of breath. She follows with pulmonology as an outpatient and is on chronic steroids. Per last outpatient note, continued on prednisone 20mg daily for sarcoid as she has had difficulty and increased hospitalizations on lower doses.    In the ED the patient was on BiPAP, chest Xray showing significant pulmonary edema. Nephology was consulted and started dialysis in room. On evaluation patient was  uncomfortable on bipap but no increased work of breathing noted. Patient endorsing neck pain from the bipap and leg pain. She denied recent fevers or chills, but did endorse a chronic cough that had been worsening recently.    * No surgery found *      Hospital Course:   Ms. Medel was initially admitted to hospital medicine on 5/30 for increased work of breathing and pulmonary edema thought to be secondary to insufficient removal of fluid in outpatient dialysis. She was dialyzed in the ED, however her work of breathing significantly increased and she was admitted to the ICU. Her blood cultures drawn on admission grew Klebsiella oxytoca and Streptococcus salivarius, and she was started on IV Vancomycin and Zosyn by the ICU team. Vancomycin was discontinued on 6/2.  Her respiratory status improved after admission to the ICU and further dialysis, and she was stepped down to hospital medicine on 6/2. A CT Chest/abd/pelvis was ordered to evaluate for source of bacteremia but was unremarkable. Infectious disease was consulted on 6/2 and recommended two week course of ceftriaxone. She had clinically improved and was discharged home with home health and home IV antibiotics on 6/3.      Consults:   Consults (From admission, onward)        Status Ordering Provider     Case Management/  Once     Provider:  (Not yet assigned)    Acknowledged ONESIMO CHAVIRA     Inpatient consult to Critical Care Medicine  Once     Provider:  (Not yet assigned)    Completed LUAN PERSAUD     Inpatient consult to Infectious Diseases  Once     Provider:  (Not yet assigned)    Completed LUAN PERSAUD     Inpatient consult to Midline team  Once     Provider:  (Not yet assigned)    Completed VICKEY MONDRAGON     Inpatient consult to Nephrology  Once     Provider:  (Not yet assigned)    Completed JORGE LUIS CASILLAS        Interval History: Patient with no complaints this morning. Eating and drinking well. Due  for dialysis today.    Review of Systems   Constitutional: Negative for chills and fever.   Gastrointestinal: Negative for abdominal pain, diarrhea, nausea and vomiting.     Objective:     Vital Signs (Most Recent):  Temp: 97.3 °F (36.3 °C) (06/03/19 1203)  Pulse: 69 (06/03/19 1203)  Resp: 20 (06/03/19 1203)  BP: 107/72 (06/03/19 1203)  SpO2: 99 % (06/03/19 1203) Vital Signs (24h Range):  Temp:  [97.3 °F (36.3 °C)-98 °F (36.7 °C)] 97.3 °F (36.3 °C)  Pulse:  [67-70] 69  Resp:  [18-20] 20  SpO2:  [96 %-100 %] 99 %  BP: (104-122)/(71-90) 107/72     Weight: 108.2 kg (238 lb 8.6 oz)  Body mass index is 36.27 kg/m².  No intake or output data in the 24 hours ending 06/03/19 1305   Physical Exam   Constitutional: She is oriented to person, place, and time. She appears well-developed and well-nourished. No distress.   HENT:   Head: Normocephalic and atraumatic.   Neck: Normal range of motion. Neck supple.   Cardiovascular: Normal rate and regular rhythm.   Pulmonary/Chest: Effort normal. No respiratory distress. She has no rales.   Abdominal: Soft. Bowel sounds are normal. She exhibits no distension. There is no tenderness. There is no guarding.   Musculoskeletal: Normal range of motion.   Neurological: She is alert and oriented to person, place, and time.   Skin: Skin is warm and dry. She is not diaphoretic.   Psychiatric: She has a normal mood and affect. Her behavior is normal.           * Bacteremia  Blood cultures: Klebsiella oxytoca and Streptococcus salivarius. Repeat BCx NGTD    CT Chest/Abd/Pelvis: Symmetric central lung hazy and streaky opacities are consistent with sarcoidosis similar with 03/07/2016.  There is probably superimposed subsegmental atelectasis at the bases.  No current adenopathy. No intra-abdominal inflammatory process    - IV ceftriaxone x2 weeks per ID, appreciate their recommendations  - midline catheter in place  - no source identified, ? Gastrointestinal translocation      Acute on chronic  combined systolic and diastolic heart failure  Echo 3/11/19: EF 35% with diastolic dysfunction    Home meds: aspirin, atorvastatin 80mg    - not on GDMT on admission  - BP too low chronically for ACE, BB, etc      Secondary adrenal insufficiency  Home meds: prednisone 10mg daily, fludrocortisone, midodrine    - de-escalate to home steroid dose. No need for taper with only 4 days of high dose steroids  - continue fludrocortisone, midodrine      Acute on chronic respiratory failure with hypoxia and hypercapnia-resolved as of 6/3/2019  Resolved after dialysis and stay in ICU      ESRD on hemodialysis  Nephrology consulted    - HD per nephro  - daily renal labs        Pulmonary sarcoidosis    On 4L O2 chronically    - will need outpatient follow up with pulmonology    Paroxysmal A-fib  Home med: warfarin   INR: 2.8 on 6/3    - resume home dosing of warfarin      Diabetes mellitus, type 2  Home med: tradjenta  A1c: 6.5    - resume tradjenta on discharge      Epilepsy  Home med: keppra 500mg bid    - continue      Wound of right leg  -  wound care      Transaminitis  Suspect secondary to sepsis, has been downtrending. Biliary duct appeared normal on CT abd      Discharge planning issues  PT/OT consulted    - home health      Other streptococcal sepsis          Final Active Diagnoses:    Diagnosis Date Noted POA    PRINCIPAL PROBLEM:  Bacteremia [R78.81] 06/01/2019 Yes    Acute on chronic combined systolic and diastolic heart failure [I50.43] 03/24/2019 Yes    Secondary adrenal insufficiency [E27.49] 02/04/2017 Yes    ESRD on hemodialysis [N18.6, Z99.2]  Not Applicable    Pulmonary sarcoidosis [D86.0] 03/19/2013 Yes    Paroxysmal A-fib [I48.0] 05/10/2019 Yes    Diabetes mellitus, type 2 [E11.9] 12/08/2017 Yes    Epilepsy [G40.909] 03/19/2013 Yes    Wound of right leg [S81.801A] 05/30/2019 Yes    Transaminitis [R74.0] 05/30/2019 Yes    Other streptococcal sepsis [A40.8] 06/02/2019 Yes    Discharge planning  issues [Z02.9] 06/02/2019 Not Applicable      Problems Resolved During this Admission:    Diagnosis Date Noted Date Resolved POA    Acute on chronic respiratory failure with hypoxia and hypercapnia [J96.21, J96.22] 05/30/2019 06/03/2019 Yes    Encephalopathy [G93.40] 05/30/2019 06/01/2019 Yes       Discharged Condition: good    Disposition:     Follow Up:  Follow-up Information     Jj Altamirano MD In 2 weeks.    Specialty:  Pulmonary Disease  Contact information:  [address]  [place] [address]  [phone]             Carlos A Caputo MD In 1 week.    Specialty:  Internal Medicine  Contact information:  [address] [address]  [phone]                 Patient Instructions:   No discharge procedures on file.    Significant Diagnostic Studies: Labs:   CMP   Recent Labs   Lab 06/02/19  0644 06/03/19  0417   * 131*   K 5.1 5.2*   CL 95 91*   CO2 22* 22*   * 107   BUN 39* 59*   CREATININE 5.3* 6.9*   CALCIUM 8.0* 7.2*   PROT 8.2 7.2   ALBUMIN 2.7* 2.5*   BILITOT 0.8 0.6   ALKPHOS 108 89   * 88*   * 159*   ANIONGAP 18* 18*   ESTGFRAFRICA 9.6* 7.0*   EGFRNONAA 8.3* 6.1*    and CBC   Recent Labs   Lab 06/02/19  0610 06/03/19  0416   WBC 12.89* 12.48   HGB 13.2 12.6   HCT 41.0 42.4   * 148*       Pending Diagnostic Studies:     None         Medications:  Reconciled Home Medications:      Medication List      START taking these medications    cefTRIAXone 2 g in dextrose 5 % 50 mL 2 g/50 mL Pgbk IVPB  Commonly known as:  ROCEPHIN  Inject 50 mLs (2 g total) into the vein once daily. END DATE: 6/14/2019 for 14 days        CHANGE how you take these medications    blood sugar diagnostic Strp  1 each by Misc.(Non-Drug; Combo Route) route once daily.  What changed:  when to take this     lancets Misc  1 each by Misc.(Non-Drug; Combo Route) route once daily.  What changed:  when to take this     warfarin 5 MG tablet  Commonly known as:  COUMADIN  TAKE 1 TABLET BY  MOUTH EVERY DAY EXCEPT TH 1AND 1/2 TABLETS ON MONDAY AND FRIDAY OR AS DIRECTED  What changed:  See the new instructions.        CONTINUE taking these medications    aspirin 81 MG EC tablet  Commonly known as:  ECOTRIN  Take 81 mg by mouth once daily.     atorvastatin 80 MG tablet  Commonly known as:  LIPITOR  TAKE 1 TABLET BY MOUTH EVERY EVENING     benzonatate 100 MG capsule  Commonly known as:  TESSALON  Take 1 capsule (100 mg total) by mouth 3 (three) times daily. for 10 days     cinacalcet 30 MG Tab  Commonly known as:  SENSIPAR  Take 30 mg by mouth daily with breakfast.     clopidogrel 75 mg tablet  Commonly known as:  PLAVIX  TAKE 1 TABLET(75 MG) BY MOUTH EVERY DAY     fludrocortisone 0.1 mg Tab  Commonly known as:  FLORINEF  Take 1 tablet (100 mcg total) by mouth 2 (two) times daily.     gabapentin 600 MG tablet  Commonly known as:  NEURONTIN  Take 600 mg by mouth 2 (two) times daily.     levETIRAcetam 500 MG Tab  Commonly known as:  KEPPRA  TAKE 1 TABLET BY MOUTH TWICE DAILY     midodrine 10 MG tablet  Commonly known as:  PROAMATINE  Take 1 tablet (10 mg total) by mouth 3 (three) times daily.     nortriptyline 25 MG capsule  Commonly known as:  PAMELOR  Take 1 capsule by mouth nightly     omeprazole 20 MG capsule  Commonly known as:  PRILOSEC  TAKE 1 CAPSULE BY MOUTH EVERY DAY     patiromer calcium sorbitex 16.8 gram Pwpk  Commonly known as:  VELTASSA  Take 33.6 g by mouth once daily.     prednisoLONE acetate 1 % Drps  Commonly known as:  PRED FORTE  INSTILL ONE DROP INTO  LEFT EYE THREE TIMES A DAY     predniSONE 20 MG tablet  Commonly known as:  DELTASONE  Take 0.5 tablets (10 mg total) by mouth once daily.     pregabalin 100 MG capsule  Commonly known as:  LYRICA  Take 1 capsule (100 mg total) by mouth 3 (three) times daily.     PROLENSA 0.07 % Drop  Generic drug:  bromfenac  INSTILL 1 DROP IN LEFT / RIGHT EYE QD     DENISE-LINDA 0.8 mg Tab  Generic drug:  B complex-vitamin C-folic acid  TAKE 1 TABLET BY  MOUTH ONCE DAILY     RENVELA 800 mg Tab  Generic drug:  sevelamer carbonate  TAKE 1 TABLET BY MOUTH THREE TIMES DAILY WITH MEALS     tiZANidine 4 MG tablet  Commonly known as:  ZANAFLEX  TAKE 1 TABLET BY MOUTH EVERY DAY AS NEEDED FOR MUSCLE SPASMS     TRADJENTA 5 mg Tab tablet  Generic drug:  linagliptin  TAKE 1 TABLET(5 MG) BY MOUTH EVERY DAY        STOP taking these medications    sodium polystyrene powder  Commonly known as:  KAYEXALATE            Indwelling Lines/Drains at time of discharge:   Lines/Drains/Airways     Drain                 Hemodialysis AV Graft Left upper arm -- days         Hemodialysis AV Graft Left upper arm -- days                Time spent on the discharge of patient: 30 minutes  Patient was seen and examined on the date of discharge and determined to be suitable for discharge.         Ivy Vaughn DO  Department of Hospital Medicine  [facility]

## 2019-06-03 NOTE — ASSESSMENT & PLAN NOTE
Home meds: prednisone 10mg daily, fludrocortisone, midodrine    - de-escalate to home steroid dose. No need for taper with only 4 days of high dose steroids  - continue fludrocortisone, midodrine

## 2019-06-03 NOTE — ASSESSMENT & PLAN NOTE
Blood cultures: Klebsiella oxytoca and Streptococcus salivarius. Repeat BCx NGTD    CT Chest/Abd/Pelvis: Symmetric central lung hazy and streaky opacities are consistent with sarcoidosis similar with 03/07/2016.  There is probably superimposed subsegmental atelectasis at the bases.  No current adenopathy. No intra-abdominal inflammatory process    - IV ceftriaxone x2 weeks per ID, appreciate their recommendations  - midline catheter in place  - no source identified, ? Gastrointestinal translocation

## 2019-06-03 NOTE — PLAN OF CARE
06/03/19 1211   Post-Acute Status   Post-Acute Authorization Home Health/Hospice   Home Health/Hospice Status Referrals Sent     Adeola was asked to arrange IV abx for Pt who will d/c home today. Adeola sent [facility] infusion referral and Adeola faxed [facility] for auth request and  provider. Royer Sauceda at  to meet with Pt and do teaching. Pt 100 percent covered by insurance for abx.

## 2019-06-03 NOTE — PROGRESS NOTES
Patient arrived on unit via bed. Unable to obtain patient weight. Dialysis initiated via left arm AVF with 15g needles x 2 without difficulty. Orders and machine settings verified. Tx started. VSS. 400 BFR established.      Hemodialysis tx. Patient ran on off day. T-T-S

## 2019-06-03 NOTE — NURSING
I was assisting patient to the chair when her legs began to give out on her. I was able to  assist her to a sitting position. No head injury. No  injuries noted. Pt states that her buttocks is hurting from sitting on the floor while awaiting help to assist her back to her bed. PRN pain meds given. Charge nurse notified. Md notified. Will call pts. Family in the am.

## 2019-06-03 NOTE — PT/OT/SLP PROGRESS
"Physical Therapy Treatment    Patient Name:  Sisi Medel   MRN:  [id number]    Recommendations:     Discharge Recommendations:  home health PT, home health OT   Discharge Equipment Recommendations: none   Barriers to discharge: None    Assessment:     Sisi Medel is a 58 y.o. female admitted with a medical diagnosis of Bacteremia.  She presents with the following impairments/functional limitations:  weakness, impaired endurance, impaired self care skills, impaired functional mobilty, gait instability, impaired balance, impaired cardiopulmonary response to activity. Pt tolerated session well with focus on bed mobility, transfers, and gait training. Pt progressing fairly after NSG reports fall this day with pt attempting to stand without assistance of RW. Pt does require Min/Mod A for standing from EOB this day. Pt will continue to benefit from therapy services to address impairments listed above.     Rehab Prognosis: Good; patient would benefit from acute skilled PT services to address these deficits and reach maximum level of function.    Recent Surgery: * No surgery found *      Plan:     During this hospitalization, patient to be seen 4 x/week to address the identified rehab impairments via gait training, therapeutic activities and progress toward the following goals:    · Plan of Care Expires:  06/29/19    Subjective     Chief Complaint: pain at rest; no pain reported with mobility  Patient/Family Comments/goals: "I didn't fall hard or anything, she kind of just helped me down to my butt."   Pain/Comfort:  · Pain Rating 1: unrated c/o pain      Objective:     Communicated with NSG prior to session.  Patient found HOB elevated with telemetry, oxygen upon PTA entry to room.     General Precautions: Standard, fall   Orthopedic Precautions:N/A   Braces: N/A     Functional Mobility:  · Bed Mobility:     · Supine to Sit: stand by assistance  · Sit to Supine: stand by assistance  · Transfers:   "   · Sit to Stand:  minimum assistance and moderate assistance with rolling walker  · Gait: Pt ambulates 16 ft with RW and CGA. Pt with slow merly, increased lateral sway, and limited by increased SOB with exertion.       AM-PAC 6 CLICK MOBILITY  Turning over in bed (including adjusting bedclothes, sheets and blankets)?: 3  Sitting down on and standing up from a chair with arms (e.g., wheelchair, bedside commode, etc.): 2  Moving from lying on back to sitting on the side of the bed?: 3  Moving to and from a bed to a chair (including a wheelchair)?: 3  Need to walk in hospital room?: 3  Climbing 3-5 steps with a railing?: 2  Basic Mobility Total Score: 16       Therapeutic Activities and Exercises:  Pt assisted with functional mobility as noted above.   Pt performs sit<>stand x 5 trials with Min A and RW for 4 trials and Mod A x 1 trial. Pt cued for improved forward weight shift and BLE knee extension.   Pt educated on safety with all mobility.     Patient left HOB elevated with all lines intact and call button in reach.    GOALS:   Multidisciplinary Problems     Physical Therapy Goals        Problem: Physical Therapy Goal    Goal Priority Disciplines Outcome Goal Variances Interventions   Physical Therapy Goal     PT, PT/OT Ongoing (interventions implemented as appropriate)     Description:  Goals to be met by: 6/15/19    Patient will increase functional independence with mobility by performin. Supine to sit with Stand-by Assistance -not met  2. Sit to stand transfer with Supervision with AD -not met  3. Gait  x 150 feet with Supervision using Rolling Walker. -not met  4. Ascend/descend 5 stair with right Handrails Contact Guard Assistance  -not met                      Time Tracking:     PT Received On: 19  PT Start Time: 901     PT Stop Time: 928  PT Total Time (min): 27 min     Billable Minutes: Gait Training 12 and Therapeutic Activity 15    Treatment Type: Treatment  PT/PTA: PTA     PTA Visit  Number: 1     Bernabe Branch, VERONICA  06/03/2019

## 2019-06-03 NOTE — PROGRESS NOTES
Hemodialysis Note  Pt seen and evaluated while undergoing dialysis. Tolerating dialysis with current UFR, without complications. Labs reviewed and dialysate bath adjusted. Pt with anticipated discharge; not at baseline; if pt remains overnight, will plan for HD tomorrow; d/w Dr. Chandra; also d/w Primary Team    BFR: 350  UF goal: 2L as tolerated  Anemia: hold epogen  MBD: continue sevelamer carbonate  HTN: stable; on midodrine  Diet: renal with 800 mL FR/day

## 2019-06-03 NOTE — PROGRESS NOTES
Dialysis tx completed. 3.5 hours. 2.5 liters removed. Needles pulled from right arm AVF. Hemostasis achieved. Gauze and tape to sites.  Tolerated tx well. Report called to Delores RENTERIA.    Patient post standing weight 108.9kg.

## 2019-06-04 PROBLEM — S81.801A LEG WOUND, RIGHT: Status: ACTIVE | Noted: 2019-01-01

## 2019-06-04 PROBLEM — S91.101A OPEN WOUND OF RIGHT GREAT TOE: Status: ACTIVE | Noted: 2019-01-01

## 2019-06-04 PROBLEM — S91.109A OPEN WOUND OF TOE: Status: ACTIVE | Noted: 2019-01-01

## 2019-06-04 NOTE — PLAN OF CARE
Problem: Adult Inpatient Plan of Care  Goal: Plan of Care Review  tx completed pt c/o lower back pain at end of tx. tx discontinued 10 min early as a result IVETH Tao here and aware..bld returned, dialyzer cleared and moderate streaking noted. Needles removed pressure held x5 min each and pressure dsg applied after. Report called and transported back to room via stretcher w/nad, vss.

## 2019-06-04 NOTE — PROGRESS NOTES
[facility] Hospital Medicine  Progress Note    Patient Name: Sisi Medel  MRN: [id number]  Patient Class: IP- Inpatient   Admission Date: 5/30/2019  Length of Stay: 5 days  Attending Physician: Jeanne Ortega MD  Primary Care Provider: Carlos A Caputo MD    Hospital Medicine [place] [facility] 5 VINAY Suh MD    Subjective:     Principal Problem:Gram-negative bacteremia    HPI:  Ms. Medel is a 58 year old lady with a past medical history of pulmonary sarcoidosis on 3-5L home oxygen, Class 3 HFrEF (3/2019 EF 35%), perm Afib/flutter s/p 6/2017 AVN ablation & PPM, ESRD (TTS), Type 2 DM (3/2019 A1c 6.2%), pulmonary HTN, YOANDY, and orthostatic hypotension who [place] [facility] with complaints of shortness of breath. The patient went to dialysis today and apparently could not get down to her dry weight, per neprhology should be 111kg but patient only got to 112. Per the patient her BP's were fine and she did not have any trouble with the dialysis. Upon returning to the car the patient had worsening shortness of breath and had to increase O2 to 5L. Per the patient and her family these symptoms have been persistent for several weeks. She was [place] [facility] [place] ([place]/29/19) following a 2 day admission for similar complaints. At that time the patient needed dialysis for volume overload and new dry weight was established.    Of note patient has had frequent hospitalizations in the past. These admissions are usually for volume overload, CHF, and shortness of breath. She follows with pulmonology as an outpatient and is on chronic steroids. Per last outpatient note, continued on prednisone 20mg daily for sarcoid as she has had difficulty and increased hospitalizations on lower doses.    In the ED the patient was on BiPAP, chest Xray showing significant pulmonary edema. Nephology was consulted and started dialysis in room. On evaluation patient was uncomfortable on bipap but no  increased work of breathing noted. Patient endorsing neck pain from the bipap and leg pain. She denied recent fevers or chills, but did endorse a chronic cough that had been worsening recently.    Hospital Course:  Ms. Medel was initially admitted to hospital medicine on 5/30 for increased work of breathing and pulmonary edema thought to be secondary to insufficient removal of fluid in outpatient dialysis. She was dialyzed in the ED, however her work of breathing significantly increased and she was admitted to the ICU. Her blood cultures drawn on admission grew Klebsiella oxytoca and Streptococcus salivarius, and she was started on IV Vancomycin and Zosyn by the ICU team. Vancomycin was discontinued on 6/2.  Her respiratory status improved after admission to the ICU and further dialysis, and she was stepped down to hospital medicine on 6/2. A CT Chest/abd/pelvis was ordered to evaluate for source of bacteremia but was unremarkable. Infectious disease was consulted on 6/2 and recommended two week course of ceftriaxone. She had clinically improved and was plan to discharge home with home health and home IV antibiotics on 6/3. However, patient deteriated overnight, feeling fatigue and increase SOB. Patient was reassessed and PT/OT was reconsulted.    Interval History: patient not feeling well this morning. She complains of fatigue after dialysis. Patient not able to ambulate well from the bed to bedside commode. Patient continuous to feel short of breath and family expressed the decline in function. Patient denied fever chill, nausea, vomiting and CP.        Review of Systems   Constitutional: Positive for fatigue. Negative for chills and fever.   HENT: Negative for rhinorrhea, sneezing and sore throat.    Eyes: Negative for visual disturbance.   Respiratory: Positive for cough (nonproductive). Negative for chest tightness, shortness of breath and wheezing.    Cardiovascular: Negative for chest pain, palpitations  and leg swelling.   Gastrointestinal: Negative for abdominal distention, abdominal pain, constipation, diarrhea, nausea and vomiting.   Genitourinary: Negative for dysuria, flank pain and hematuria.   Musculoskeletal: Positive for myalgias. Negative for arthralgias, back pain, neck pain and neck stiffness.   Skin: Negative for color change and rash.   Neurological: Positive for weakness. Negative for dizziness, light-headedness and headaches.   Psychiatric/Behavioral: Negative for agitation, behavioral problems, confusion and decreased concentration.     Objective:     Vital Signs (Most Recent):  Temp: 97.6 °F (36.4 °C) (06/04/19 0841)  Pulse: 70 (06/04/19 1045)  Resp: 16 (06/04/19 1045)  BP: 116/68 (06/04/19 1045)  SpO2: 100 % (06/04/19 0803) Vital Signs (24h Range):  Temp:  [96.8 °F (36 °C)-98.4 °F (36.9 °C)] 97.6 °F (36.4 °C)  Pulse:  [63-74] 70  Resp:  [16-20] 16  SpO2:  [95 %-100 %] 100 %  BP: (100-144)/(52-87) 116/68     Weight: 108.2 kg (238 lb 8.6 oz)  Body mass index is 36.27 kg/m².    Intake/Output Summary (Last 24 hours) at 6/4/2019 1117  Last data filed at 6/3/2019 1815  Gross per 24 hour   Intake 840 ml   Output 3100 ml   Net -2260 ml      Physical Exam   Constitutional: She is oriented to person, place, and time. She appears well-developed and well-nourished. No distress.   HENT:   Head: Normocephalic and atraumatic.   Neck: Normal range of motion. Neck supple.   Cardiovascular: Normal rate and regular rhythm.   Pulmonary/Chest: Effort normal. No respiratory distress. She has no rales.   Crackles throughout the lung field.    Abdominal: Soft. Bowel sounds are normal. She exhibits no distension. There is no tenderness. There is no guarding.   Musculoskeletal: Normal range of motion.   Neurological: She is alert and oriented to person, place, and time.   Skin: Skin is warm and dry. No rash noted. She is not diaphoretic. No erythema. No pallor.   Ulcers on left and right foot. Active Bleeding noticed on  left foot when removed dressing.    Psychiatric: She has a normal mood and affect. Her behavior is normal.       Significant Labs:   CBC:   Recent Labs   Lab 06/03/19  0416 06/04/19  0700   WBC 12.48 13.11*   HGB 12.6 12.2   HCT 42.4 41.2   * 168     CMP:   Recent Labs   Lab 06/03/19  0417 06/04/19  0700   * 134*   K 5.2* 4.0   CL 91* 97   CO2 22* 23    106   BUN 59* 42*   CREATININE 6.9* 5.5*   CALCIUM 7.2* 8.1*   PROT 7.2 7.3   ALBUMIN 2.5* 2.6*   BILITOT 0.6 0.5   ALKPHOS 89 90   AST 88* 52*   * 120*   ANIONGAP 18* 14   EGFRNONAA 6.1* 8.0*       Significant Imaging: CXR: I have reviewed all pertinent results/findings within the past 24 hours and my personal findings are:       Assessment/Plan:      * Gram-negative bacteremia  Blood cultures: Klebsiella oxytoca and Streptococcus salivarius. Repeat BCx NGTD    CT Chest/Abd/Pelvis: Symmetric central lung hazy and streaky opacities are consistent with sarcoidosis similar with 03/07/2016.  There is probably superimposed subsegmental atelectasis at the bases.  No current adenopathy. No intra-abdominal inflammatory process    - IV ceftriaxone x2 weeks per ID, appreciate their recommendations  - midline catheter in place  - no source identified, ? Gastrointestinal translocation       Discharge planning issues  PT/OT consulted    - home health for PT/OT and IV abx but we will explore option of nursing home.       Other streptococcal sepsis        Transaminitis  Suspect secondary to sepsis, has been downtrending. Biliary duct appeared normal on CT abd       Wound of right leg  - HH wound care       Paroxysmal A-fib  Home med: warfarin   INR: 2.8 on 6/3    - resume home dosing of warfarin       ESRD on hemodialysis  Nephrology consulted    - HD per nephro Betina and Sat schedule  - daily renal labs         Acute on chronic combined systolic and diastolic heart failure  Echo 3/11/19: EF 35% with diastolic dysfunction    Home meds: aspirin,  atorvastatin 80mg    - not on GDMT on admission  - BP too low chronically for ACE, BB, etc       Diabetes mellitus, type 2  Home med: tradjenta  A1c: 6.5    - resume tradjenta on discharge       Secondary adrenal insufficiency  Home meds: prednisone 20mg daily for 3 days then restart home dose. Conintue fludrocortisone, midodrine    - de-escalate to home steroid dose. No need for taper with only 4 days of high dose steroids  - continue fludrocortisone, midodrine      Epilepsy  Home med: keppra 500mg bid    - continue      Pulmonary sarcoidosis    On 4L O2 chronically    - will need outpatient follow up with pulmonology  - Continue steroid       VTE Risk Mitigation (From admission, onward)        Ordered     warfarin (COUMADIN) tablet 5 mg  Daily      06/03/19 0804     IP VTE HIGH RISK PATIENT  Once      05/30/19 1800              VINAY Suh MD  Department of Hospital Medicine   [facility]

## 2019-06-04 NOTE — PROGRESS NOTES
Consult received per MD for wounds to bilateral lower legs.Pt was admitted on 5/30/19 for Gram negative bacteremia. Pt has a PMHx of pulmonary sarcoidosis on 3-5L home oxygen, CHF, Afib/flutter, ESRD (TTS), Type 2 DM, pulmonary HTN, YOANDY, and orthostatic hypotension, and on dialysis.    Received pt awake and alert with O2 in use per NC with family at bedside. Pt had gone to dialysis earlier this morning. Per family at bedside pt will be discharged with home health. Per pt's nurse, pt is slated to be discharged home today.    Upon assessment of left lateral lower leg: ulceration with pink granular tissue and mostly slough with multiple scattered areas noted to veto wound.    To Right Great Toe: area of dry stable eschar to left toe without any drainage or signs of infection noted at this time.    To Left Great Toe: bright red blood noted to dressing; however, not active bleeding noted once dressing was removed. Pt had a small laceration to the plantar portion of the toe.     Pt states she has had the sore to her left leg and to her left toe for some time and that she sees a podiatrist. However, pt is unsure how or when the laceration to her left great toe occurred.    (see assessment and photos below)    Cleansed all wounds with sterile normal saline. Applied a border foam to the left lateral lower leg. Left the right great toe open to air with wound care orders for nurses to pain left great toe daily with betadine and cover with a border foam. To left great toe applied aquacel ag with roll gauze and paper tape to cover.    Recommendations:  -Nursing to cleanse left lateral leg wound with sterile normal saline and apply a border foam to cover. Dressing may be changed 2 x a week or PRN if soiled or dislodged.  -To Left Great Toe: Nursing to cleanse with normal saline and cover with aquacel ag and cover with gauze and paper tape. Dressing may be changed 2 x a week and PRN if soiled or dislodged.  -To Right Great Toe:  nursing to apply betadine to dry stable eschar and cover with a border foam.  -Post discharge pt to follow up with established outpatient podiatrist.  -Pt to continue to receive assistance per home health; however, pt might benefit from going to a Skilled Nursing Facility if she qualifies.     Discussed plan of care with pt, pt's family, pt's nurse at bedside, and with Dr. Ortega via secure chat.    Wound care to follow pt PRN. [id number]       06/04/19 1453        Wound 06/04/19 Ulceration lateral Leg   Date First Assessed: 06/04/19   Pre-existing: Yes  Primary Wound Type: Ulceration  Side: Right  Orientation: lateral  Location: Leg   Wound Image    Wound WDL ex   Dressing Appearance Dry;Intact;Clean   Drainage Amount Small   Drainage Characteristics/Odor Serosanguineous   Appearance Pink;Red;Slough   Tissue loss description Partial thickness   Periwound Area Intact;Satellite lesion;Scar tissue;Swelling   Wound Edges Open   Wound Length (cm) 1 cm   Wound Width (cm) 0.5 cm   Wound Depth (cm) 0.1 cm   Wound Volume (cm^3) 0.05 cm^3   Wound Surface Area (cm^2) 0.5 cm^2   Care Cleansed with:;Sterile normal saline;Applied:;Skin Barrier   Dressing Applied;Foam   Dressing Change Due 06/07/19        Wound 06/04/19 Ulceration Toe, first   Date First Assessed: 06/04/19   Pre-existing: Yes  Primary Wound Type: Ulceration  Side: Right  Location: Toe, first   Wound Image    Wound WDL ex   Dressing Appearance Dry;Intact   Drainage Amount None   Drainage Characteristics/Odor No odor   Appearance Eschar   Black (%), Wound Tissue Color 100 %   Periwound Area Intact;Dry   Wound Edges Other (see comments)  (stable eschar)   Wound Length (cm) 1 cm   Wound Width (cm) 1 cm   Wound Depth (cm) 0.1 cm   Wound Volume (cm^3) 0.1 cm^3   Wound Surface Area (cm^2) 1 cm^2   Care Cleansed with:;Sterile normal saline   Dressing Other (see comments)  (nursing to paint with betadine and apply border foam )   Dressing Change Due 06/04/19        Wound  06/04/19 Laceration Toe, first   Date First Assessed: 06/04/19   Pre-existing: (c)   Primary Wound Type: Laceration  Side: Left  Location: Toe, first   Wound Image    Wound WDL ex   Dressing Appearance Moist drainage   Drainage Amount Moderate   Drainage Characteristics/Odor Bleeding controlled   Appearance Pink;Red   Tissue loss description Not applicable   Periwound Area Intact;Moist   Wound Edges Open   Wound Length (cm) 0.5 cm   Wound Width (cm) 1 cm   Wound Depth (cm) 0.1 cm   Wound Volume (cm^3) 0.05 cm^3   Wound Surface Area (cm^2) 0.5 cm^2   Care Cleansed with:;Sterile normal saline;Applied:;Skin Barrier   Dressing Applied;Changed;Hydrofiber;Silver;Rolled gauze   Dressing Change Due 06/07/19       Left Anterior Lower Leg        Right Anterior Lower Leg

## 2019-06-04 NOTE — DISCHARGE SUMMARY
[facility] Hospital Medicine  Discharge Summary      Patient Name: Sisi Medel  MRN: [id number]  Admission Date: 5/30/2019  Hospital Length of Stay: 5 days  Discharge Date and Time:  06/04/2019 11:34 AM  Attending Physician: Jeanne Ortega MD   Discharging Provider: Gage Suh MD  Primary Care Provider: Carlos A Caputo MD  Hospital Medicine Team: [facility] HOSP MED 5 VINAY Suh MD    HPI:   Ms. Medel is a 58 year old lady with a past medical history of pulmonary sarcoidosis on 3-5L home oxygen, Class 3 HFrEF (3/2019 EF 35%), perm Afib/flutter s/p 6/2017 AVN ablation & PPM, ESRD (TTS), Type 2 DM (3/2019 A1c 6.2%), pulmonary HTN, YOANDY, and orthostatic hypotension who presented to [facility] ED with complaints of shortness of breath. The patient went to dialysis today and apparently could not get down to her dry weight, per neprhology should be 111kg but patient only got to 112. Per the patient her BP's were fine and she did not have any trouble with the dialysis. Upon returning to the car the patient had worsening shortness of breath and had to increase O2 to 5L. Per the patient and her family these symptoms have been persistent for several weeks. She was discharged from [facility] yesterday (5/29/19) following a 2 day admission for similar complaints. At that time the patient needed dialysis for volume overload and new dry weight was established.    Of note patient has had frequent hospitalizations in the past. These admissions are usually for volume overload, CHF, and shortness of breath. She follows with pulmonology as an outpatient and is on chronic steroids. Per last outpatient note, continued on prednisone 20mg daily for sarcoid as she has had difficulty and increased hospitalizations on lower doses.    In the ED the patient was on BiPAP, chest Xray showing significant pulmonary edema. Nephology was consulted and started dialysis in room. On evaluation patient was uncomfortable on  bipap but no increased work of breathing noted. Patient endorsing neck pain from the bipap and leg pain. She denied recent fevers or chills, but did endorse a chronic cough that had been worsening recently.    * No surgery found *      Hospital Course:   Ms. Medel was initially admitted to hospital medicine on 5/30 for increased work of breathing and pulmonary edema thought to be secondary to insufficient removal of fluid in outpatient dialysis. She was dialyzed in the ED, however her work of breathing significantly increased and she was admitted to the ICU. Her blood cultures drawn on admission grew Klebsiella oxytoca and Streptococcus salivarius, and she was started on IV Vancomycin and Zosyn by the ICU team. Vancomycin was discontinued on 6/2.  Her respiratory status improved after admission to the ICU and further dialysis, and she was stepped down to hospital medicine on 6/2. A CT Chest/abd/pelvis was ordered to evaluate for source of bacteremia but was unremarkable. Infectious disease was consulted on 6/2 and recommended two week course of ceftriaxone. She had clinically improved and was discharged home with home health and home IV antibiotics on 6/3.      Consults:   Consults (From admission, onward)        Status Ordering Provider     Case Management/  Once     Provider:  (Not yet assigned)    Acknowledged ONESIMO CHAVIRA     Inpatient consult to Critical Care Medicine  Once     Provider:  (Not yet assigned)    Completed LUAN PERSAUD     Inpatient consult to Infectious Diseases  Once     Provider:  (Not yet assigned)    Completed LUAN PERSAUD     Inpatient consult to Midline team  Once     Provider:  (Not yet assigned)    Completed VICKEY MONDRAGON     Inpatient consult to Nephrology  Once     Provider:  (Not yet assigned)    Completed JORGE LUIS CASILLAS          Vitals:    06/04/19 1000 06/04/19 1015 06/04/19 1030 06/04/19 1045   BP: (!) 144/62 110/68 100/60 116/68    BP Location:       Patient Position:       Pulse: 70 69 68 70   Resp: 16 18 16 16   Temp:       TempSrc:       SpO2:       Weight:       Height:         Physical Exam   Constitutional: She is oriented to person, place, and time. She appears well-developed and well-nourished. No distress.   HENT:   Head: Normocephalic and atraumatic.   Neck: Normal range of motion. Neck supple.   Cardiovascular: Normal rate and regular rhythm.   Pulmonary/Chest: Effort normal. No respiratory distress. She has no rales.   Abdominal: Soft. Bowel sounds are normal. She exhibits no distension. There is no tenderness. There is no guarding.   Musculoskeletal: Normal range of motion.   Neurological: She is alert and oriented to person, place, and time.   Skin: Skin is warm and dry. She is not diaphoretic.   Psychiatric: She has a normal mood and affect. Her behavior is normal.       * Gram-negative bacteremia  Blood cultures: Klebsiella oxytoca and Streptococcus salivarius. Repeat BCx NGTD    CT Chest/Abd/Pelvis: Symmetric central lung hazy and streaky opacities are consistent with sarcoidosis similar with 03/07/2016.  There is probably superimposed subsegmental atelectasis at the bases.  No current adenopathy. No intra-abdominal inflammatory process    - IV ceftriaxone x2 weeks per ID, appreciate their recommendations  - midline catheter in place  - no source identified, ? Gastrointestinal translocation       Discharge planning issues  PT/OT consulted    - home health for PT/OT and IV abx       Transaminitis  Suspect secondary to sepsis, has been downtrending. Biliary duct appeared normal on CT abd       Wound of right leg  - HH wound care       Paroxysmal A-fib  Home med: warfarin   INR: 2.8 on 6/3    - resume home dosing of warfarin       ESRD on hemodialysis  Nephrology consulted    - HD per nephro  - daily renal labs         Acute on chronic combined systolic and diastolic heart failure  Echo 3/11/19: EF 35% with diastolic  dysfunction    Home meds: aspirin, atorvastatin 80mg    - not on GDMT on admission  - BP too low chronically for ACE, BB, etc       Diabetes mellitus, type 2  Home med: tradjenta  A1c: 6.5    - resume tradjenta on discharge       Secondary adrenal insufficiency  Home meds: prednisone 10mg daily, fludrocortisone, midodrine    - de-escalate to home steroid dose. No need for taper with only 4 days of high dose steroids  - continue fludrocortisone, midodrine      Epilepsy  Home med: keppra 500mg bid    - continue      Pulmonary sarcoidosis    On 4L O2 chronically    - will need outpatient follow up with pulmonology      Final Active Diagnoses:    Diagnosis Date Noted POA    PRINCIPAL PROBLEM:  Gram-negative bacteremia [R78.81] 06/01/2019 Yes    Other streptococcal sepsis [A40.8] 06/02/2019 Yes    Discharge planning issues [Z02.9] 06/02/2019 Not Applicable    Wound of right leg [S81.801A] 05/30/2019 Yes    Transaminitis [R74.0] 05/30/2019 Yes    Paroxysmal A-fib [I48.0] 05/10/2019 Yes    Acute on chronic combined systolic and diastolic heart failure [I50.43] 03/24/2019 Yes    ESRD on hemodialysis [N18.6, Z99.2]  Not Applicable    Diabetes mellitus, type 2 [E11.9] 12/08/2017 Yes    Secondary adrenal insufficiency [E27.49] 02/04/2017 Yes    Epilepsy [G40.909] 03/19/2013 Yes    Pulmonary sarcoidosis [D86.0] 03/19/2013 Yes      Problems Resolved During this Admission:    Diagnosis Date Noted Date Resolved POA    Acute on chronic respiratory failure with hypoxia and hypercapnia [J96.21, J96.22] 05/30/2019 06/03/2019 Yes    Encephalopathy [G93.40] 05/30/2019 06/01/2019 Yes       Discharged Condition: good    Disposition: Home or Self Care    Follow Up:  Follow-up Information     Jj Altamirano MD In 2 weeks.    Specialty:  Pulmonary Disease  Contact information:  [address]  [facility] [address]  [phone]             Carlos A Caputo MD In 1 week.    Specialty:  Internal Medicine  Contact  information:  [address]  [facility] [address]  [phone]             [facility]    Specialties:  Pharmacist, DME Provider, IV Infusion  Contact information:  [address] [address] [phone]                 Patient Instructions:   No discharge procedures on file.    Significant Diagnostic Studies: Labs:   BMP:   Recent Labs   Lab 06/03/19  0417 06/04/19  0700    106   * 134*   K 5.2* 4.0   CL 91* 97   CO2 22* 23   BUN 59* 42*   CREATININE 6.9* 5.5*   CALCIUM 7.2* 8.1*    and CBC   Recent Labs   Lab 06/03/19  0416 06/04/19  0700   WBC 12.48 13.11*   HGB 12.6 12.2   HCT 42.4 41.2   * 168       Pending Diagnostic Studies:     None         Medications:  Reconciled Home Medications:      Medication List      START taking these medications    cefTRIAXone 2 g in dextrose 5 % 50 mL 2 g/50 mL Pgbk IVPB  Commonly known as:  ROCEPHIN  Inject 50 mLs (2 g total) into the vein once daily. END DATE: 6/14/2019 for 14 days        CHANGE how you take these medications    blood sugar diagnostic Strp  1 each by Misc.(Non-Drug; Combo Route) route once daily.  What changed:  when to take this     lancets Misc  1 each by Misc.(Non-Drug; Combo Route) route once daily.  What changed:  when to take this     warfarin 5 MG tablet  Commonly known as:  COUMADIN  TAKE 1 TABLET BY MOUTH EVERY DAY EXCEPT TH 1AND 1/2 TABLETS ON MONDAY AND FRIDAY OR AS DIRECTED  What changed:  See the new instructions.        CONTINUE taking these medications    aspirin 81 MG EC tablet  Commonly known as:  ECOTRIN  Take 81 mg by mouth once daily.     atorvastatin 80 MG tablet  Commonly known as:  LIPITOR  TAKE 1 TABLET BY MOUTH EVERY EVENING     benzonatate 100 MG capsule  Commonly known as:  TESSALON  Take 1 capsule (100 mg total) by mouth 3 (three) times daily. for 10 days     cinacalcet 30 MG Tab  Commonly known as:  SENSIPAR  Take 30 mg by mouth daily with breakfast.     clopidogrel 75 mg  tablet  Commonly known as:  PLAVIX  TAKE 1 TABLET(75 MG) BY MOUTH EVERY DAY     fludrocortisone 0.1 mg Tab  Commonly known as:  FLORINEF  Take 1 tablet (100 mcg total) by mouth 2 (two) times daily.     gabapentin 600 MG tablet  Commonly known as:  NEURONTIN  Take 600 mg by mouth 2 (two) times daily.     levETIRAcetam 500 MG Tab  Commonly known as:  KEPPRA  TAKE 1 TABLET BY MOUTH TWICE DAILY     midodrine 10 MG tablet  Commonly known as:  PROAMATINE  Take 1 tablet (10 mg total) by mouth 3 (three) times daily.     nortriptyline 25 MG capsule  Commonly known as:  PAMELOR  Take 1 capsule by mouth nightly     omeprazole 20 MG capsule  Commonly known as:  PRILOSEC  TAKE 1 CAPSULE BY MOUTH EVERY DAY     patiromer calcium sorbitex 16.8 gram Pwpk  Commonly known as:  VELTASSA  Take 33.6 g by mouth once daily.     prednisoLONE acetate 1 % Drps  Commonly known as:  PRED FORTE  INSTILL ONE DROP INTO  LEFT EYE THREE TIMES A DAY     predniSONE 20 MG tablet  Commonly known as:  DELTASONE  Take 0.5 tablets (10 mg total) by mouth once daily.     pregabalin 100 MG capsule  Commonly known as:  LYRICA  Take 1 capsule (100 mg total) by mouth 3 (three) times daily.     PROLENSA 0.07 % Drop  Generic drug:  bromfenac  INSTILL 1 DROP IN LEFT / RIGHT EYE QD     DENISE-LINDA 0.8 mg Tab  Generic drug:  B complex-vitamin C-folic acid  TAKE 1 TABLET BY MOUTH ONCE DAILY     RENVELA 800 mg Tab  Generic drug:  sevelamer carbonate  TAKE 1 TABLET BY MOUTH THREE TIMES DAILY WITH MEALS     tiZANidine 4 MG tablet  Commonly known as:  ZANAFLEX  TAKE 1 TABLET BY MOUTH EVERY DAY AS NEEDED FOR MUSCLE SPASMS     TRADJENTA 5 mg Tab tablet  Generic drug:  linagliptin  TAKE 1 TABLET(5 MG) BY MOUTH EVERY DAY        STOP taking these medications    sodium polystyrene powder  Commonly known as:  KAYEXALATE            Indwelling Lines/Drains at time of discharge:   Lines/Drains/Airways     Drain                 Hemodialysis AV Graft Left upper arm -- days          Hemodialysis AV Graft Left upper arm -- days                Time spent on the discharge of patient: 35 minutes  Patient was seen and examined on the date of discharge and determined to be suitable for discharge.         VINAY Suh MD  Department of Hospital Medicine  [facility]

## 2019-06-04 NOTE — PLAN OF CARE
[facility]     HOME HEALTH ORDERS  FACE TO FACE ENCOUNTER     Patient Name: Sisi Medel  YOB: 1960     PCP: Carlos A Caputo MD   PCP Address: [address] / [place] [address]  PCP Phone Number: [phone]  PCP Fax: [phone]     Encounter Date: 06/03/2019     Admit to Home Health     Diagnoses:  Active Hospital Problems     Diagnosis   POA    *Bacteremia [R78.81]   Yes       Priority: 1 - High    Acute on chronic respiratory failure with hypoxia and hypercapnia [J96.21, J96.22]   Yes       Priority: 2     Acute on chronic combined systolic and diastolic heart failure [I50.43]   Yes       Priority: 2     Secondary adrenal insufficiency [E27.49]   Yes       Priority: 2     ESRD on hemodialysis [N18.6, Z99.2]   Not Applicable       Priority: 3     Pulmonary sarcoidosis [D86.0]   Yes       Priority: 3     Paroxysmal A-fib [I48.0]   Yes       Priority: 4     Diabetes mellitus, type 2 [E11.9]   Yes       Priority: 5     Epilepsy [G40.909]   Yes       Priority: 5     Wound of right leg [S81.801A]   Yes       Priority: 6     Transaminitis [R74.0]   Yes       Priority: 7     Other streptococcal sepsis [A40.8]   Yes    Discharge planning issues [Z02.9]   Not Applicable       Resolved Hospital Problems     Diagnosis Date Resolved POA    Encephalopathy [G93.40] 06/01/2019 Yes                Future Appointments   Date Time Provider Department Center   6/3/2019 12:15 PM DIALYSIS, [facility] [address] DLYS [facility]   6/7/2019  8:00 AM Dorita Haywood NP [facility] WOUND [facility]   6/7/2019  9:45 AM Claudia Ron DPM [facility] POD [facility]   6/14/2019  8:00 AM Emeka Lester MD [facility] NEURO [facility]   7/1/2019  9:00 AM Annette Hernandez NP NOM ENDODIA [facility]   7/2/2019  8:00 AM HOME MONITOR DEVICE CHECK, [facility] ARRHPRO [facility]   7/5/2019  9:00 AM Carlos A Caputo MD [facility] IM [facility] PCW   10/1/2019  8:00 AM HOME MONITOR DEVICE CHECK, [facility] JOCELYN Deluca  [place]               I have seen and examined this patient face to face today. My clinical findings that support the need for the home health skilled services and home bound status are the following:  Weakness/numbness causing balance and gait disturbance due to Weakness/Debility making it taxing to leave home.     Allergies:        Review of patient's allergies indicates:   Allergen Reactions    Bactrim [sulfamethoxazole-trimethoprim] Other (See Comments)       Causes renal failure    Nsaids (non-steroidal anti-inflammatory drug) Other (See Comments)       Renal failure         Diet: renal diet     Activities: activity as tolerated     Nursing:   SN to complete comprehensive assessment including routine vital signs. Instruct on disease process and s/s of complications to report to MD. Review/verify medication list sent home with the patient at time of discharge  and instruct patient/caregiver as needed. Frequency may be adjusted depending on start of care date.     Notify MD if SBP > 160 or < 90; DBP > 90 or < 50; HR > 120 or < 50; Temp > 101; Other:           CONSULTS:    Physical Therapy to evaluate and treat. Evaluate for home safety and equipment needs; Establish/upgrade home exercise program. Perform / instruct on therapeutic exercises, gait training, transfer training, and Range of Motion.  Occupational Therapy to evaluate and treat. Evaluate home environment for safety and equipment needs. Perform/Instruct on transfers, ADL training, ROM, and therapeutic exercises.     MISCELLANEOUS CARE:  Home Infusion Therapy:   SN to perform Infusion Therapy/Central Line Care.  Review Central Line Care & Central Line Flush with patient.     Administer (drug and dose): ceftriaxone 2g q24h    Last dose given: 6/3 1800hrs                         Home dose due: 6/4 1800hrs  cefTRIAXone 2 g in dextrose 5 % 50 mL (ROCEPHIN) 2 g/50 mL PgBk IVPB Inject 50 mLs (2 g total) into the vein once daily. END DATE: 6/14/2019 for 14  days  Qty: 14 each, Refills: 0             Scrub the Hub: Prior to accessing the line, always perform a 30 second alcohol scrub  Each lumen of the central line is to be flushed at least daily with 10 mL Normal Saline and 3 mL Heparin flush (10 units/mL)  Skilled Nurse (SN) may draw blood from IV access  Blood Draw Procedure:              - Aspirate at least 5 mL of blood              - Discard              - Obtain specimen              - Change injection cap              - Flush with 20 mL Normal Saline followed by a                 3-5 mL Heparin flush (10 units/mL)  Central :              - Sterile dressing changes are done weekly and as needed.              - Use chlor-hexadine scrub to cleanse site, apply Biopatch to insertion site,                 apply securement device dressing              - Injection caps are changed weekly and after EVERY lab draw.              - If sterile gauze is under dressing to control oozing,                 dressing change must be performed every 24 hours until gauze is not needed.     WOUND CARE ORDERS  yes:  Neuropathic Wound:  Location: bilateral lower legs     Consult ET nurse        Apply the following to wound:              Collagenase (Santyl) daily, cover with telfa, wrap with with kerlix dressing        Medications: Review discharge medications with patient and family and provide education.             Current Discharge Medication List             START taking these medications     Details   cefTRIAXone 2 g in dextrose 5 % 50 mL (ROCEPHIN) 2 g/50 mL PgBk IVPB Inject 50 mLs (2 g total) into the vein once daily. END DATE: 6/14/2019 for 14 days  Qty: 14 each, Refills: 0                 CONTINUE these medications which have NOT CHANGED     Details   aspirin (ECOTRIN) 81 MG EC tablet Take 81 mg by mouth once daily.       atorvastatin (LIPITOR) 80 MG tablet TAKE 1 TABLET BY MOUTH EVERY EVENING  Qty: 90 tablet, Refills: 1     Associated Diagnoses: Chronic  combined systolic and diastolic heart failure       benzonatate (TESSALON) 100 MG capsule Take 1 capsule (100 mg total) by mouth 3 (three) times daily. for 10 days  Qty: 30 capsule, Refills: 0       blood sugar diagnostic Strp 1 each by Misc.(Non-Drug; Combo Route) route once daily.  Qty: 100 each, Refills: 3     Comments: Please provide test strips covered by patient's insurance  Associated Diagnoses: Uncontrolled type 2 diabetes mellitus with hyperglycemia       cinacalcet (SENSIPAR) 30 MG Tab Take 30 mg by mouth daily with breakfast.       clopidogrel (PLAVIX) 75 mg tablet TAKE 1 TABLET(75 MG) BY MOUTH EVERY DAY  Qty: 90 tablet, Refills: 0     Comments: **Patient requests 90 days supply**       fludrocortisone (FLORINEF) 0.1 mg Tab Take 1 tablet (100 mcg total) by mouth 2 (two) times daily.  Qty: 60 tablet, Refills: 5       gabapentin (NEURONTIN) 600 MG tablet Take 600 mg by mouth 2 (two) times daily.        lancets Misc 1 each by Misc.(Non-Drug; Combo Route) route once daily.  Qty: 100 each, Refills: 3     Comments: Please provide lancets covered by patient's insurance  Associated Diagnoses: Uncontrolled type 2 diabetes mellitus with hyperglycemia       levETIRAcetam (KEPPRA) 500 MG Tab TAKE 1 TABLET BY MOUTH TWICE DAILY  Qty: 180 tablet, Refills: 3     Associated Diagnoses: Seizure disorder       midodrine (PROAMATINE) 10 MG tablet Take 1 tablet (10 mg total) by mouth 3 (three) times daily.  Qty: 90 tablet, Refills: 11       nortriptyline (PAMELOR) 25 MG capsule Take 1 capsule by mouth nightly  Refills: 1       omeprazole (PRILOSEC) 20 MG capsule TAKE 1 CAPSULE BY MOUTH EVERY DAY  Qty: 90 capsule, Refills: 4     Comments: **Patient requests 90 days supply**  Associated Diagnoses: Gastroesophageal reflux disease with esophagitis       patiromer calcium sorbitex (VELTASSA) 16.8 gram PwPk Take 33.6 g by mouth once daily.        prednisoLONE acetate (PRED FORTE) 1 % DrpS INSTILL ONE DROP INTO  LEFT EYE THREE TIMES  A DAY  Refills: 3       predniSONE (DELTASONE) 20 MG tablet Take 0.5 tablets (10 mg total) by mouth once daily.  Qty: 30 tablet, Refills: 2       pregabalin (LYRICA) 100 MG capsule Take 1 capsule (100 mg total) by mouth 3 (three) times daily.  Qty: 270 capsule, Refills: 1     Associated Diagnoses: Polyneuropathy associated with underlying disease       PROLENSA 0.07 % Drop INSTILL 1 DROP IN LEFT / RIGHT EYE QD  Refills: 12       DENISE-LINDA 0.8 mg Tab TAKE 1 TABLET BY MOUTH ONCE DAILY  Refills: 0       RENVELA 800 mg Tab TAKE 1 TABLET BY MOUTH THREE TIMES DAILY WITH MEALS  Qty: 90 tablet, Refills: 0       tiZANidine (ZANAFLEX) 4 MG tablet TAKE 1 TABLET BY MOUTH EVERY DAY AS NEEDED FOR MUSCLE SPASMS  Qty: 90 tablet, Refills: 0     Associated Diagnoses: Muscle spasm       TRADJENTA 5 mg Tab tablet TAKE 1 TABLET(5 MG) BY MOUTH EVERY DAY  Qty: 90 tablet, Refills: 1     Associated Diagnoses: Controlled type 2 diabetes mellitus with complication, without long-term current use of insulin       warfarin (COUMADIN) 5 MG tablet TAKE 1 TABLET BY MOUTH EVERY DAY EXCEPT TH 1AND 1/2 TABLETS ON MONDAY AND FRIDAY OR AS DIRECTED  Qty: 120 tablet, Refills: 0     Comments: **Patient requests 90 days supply**  Associated Diagnoses: Long term current use of anticoagulant therapy; Paroxysmal atrial fibrillation                STOP taking these medications         sodium polystyrene (KAYEXALATE) powder Comments:   Reason for Stopping:                    I certify that this patient is confined to her home and needs intermittent skilled nursing care, physical therapy and occupational therapy.

## 2019-06-04 NOTE — PROGRESS NOTES
[facility] HEMODIALYSIS NOTE     Patient currently on hemodialysis for removal of uremic toxins and volume.     Patient seen and evaluated on hemodialysis, tolerating treatment, see HD flowsheet for vitals and assessments.      Ultrafiltration goal is 3L as tolerated, keep map >65     Labs have been reviewed and the dialysate bath has been adjusted.     Assessment/Plan:    -Patient seen on HD, tolerating treatment well, c/o SOB on 3L nasal canula, does not appear to be in distress and is resting quietly   -Renal diet  -Strict I/O's and daily weights  -Daily renal function panels  -Hbg 12.2, within goal of >10, no need for Epo at this time   -Phos 5.2, continue renvela with meals   -Will continue to follow while inpatient       AYAN Levine, AGNP-C  Nephrology  Pager:  [phone]

## 2019-06-04 NOTE — SUBJECTIVE & OBJECTIVE
Interval History: patient not feeling well this morning. She complains of fatigue after dialysis. Patient not able to ambulate well from the bed to bedside commode. Patient continuous to feel short of breath and family expressed the decline in function.      Review of Systems   Constitutional: Positive for fatigue. Negative for chills and fever.   HENT: Negative for rhinorrhea, sneezing and sore throat.    Eyes: Negative for visual disturbance.   Respiratory: Positive for cough (nonproductive). Negative for chest tightness, shortness of breath and wheezing.    Cardiovascular: Negative for chest pain, palpitations and leg swelling.   Gastrointestinal: Negative for abdominal distention, abdominal pain, constipation, diarrhea, nausea and vomiting.   Genitourinary: Negative for dysuria, flank pain and hematuria.   Musculoskeletal: Positive for myalgias. Negative for arthralgias, back pain, neck pain and neck stiffness.   Skin: Negative for color change and rash.   Neurological: Positive for weakness. Negative for dizziness, light-headedness and headaches.   Psychiatric/Behavioral: Negative for agitation, behavioral problems, confusion and decreased concentration.     Objective:     Vital Signs (Most Recent):  Temp: 97.6 °F (36.4 °C) (06/04/19 0841)  Pulse: 70 (06/04/19 1045)  Resp: 16 (06/04/19 1045)  BP: 116/68 (06/04/19 1045)  SpO2: 100 % (06/04/19 0803) Vital Signs (24h Range):  Temp:  [96.8 °F (36 °C)-98.4 °F (36.9 °C)] 97.6 °F (36.4 °C)  Pulse:  [63-74] 70  Resp:  [16-20] 16  SpO2:  [95 %-100 %] 100 %  BP: (100-144)/(52-87) 116/68     Weight: 108.2 kg (238 lb 8.6 oz)  Body mass index is 36.27 kg/m².    Intake/Output Summary (Last 24 hours) at 6/4/2019 1117  Last data filed at 6/3/2019 1815  Gross per 24 hour   Intake 840 ml   Output 3100 ml   Net -2260 ml      Physical Exam   Constitutional: She is oriented to person, place, and time. She appears well-developed and well-nourished. No distress.   HENT:   Head:  Normocephalic and atraumatic.   Neck: Normal range of motion. Neck supple.   Cardiovascular: Normal rate and regular rhythm.   Pulmonary/Chest: Effort normal. No respiratory distress. She has no rales.   Crackles throughout the lung field.    Abdominal: Soft. Bowel sounds are normal. She exhibits no distension. There is no tenderness. There is no guarding.   Musculoskeletal: Normal range of motion.   Neurological: She is alert and oriented to person, place, and time.   Skin: Skin is warm and dry. No rash noted. She is not diaphoretic. No erythema. No pallor.   Ulcers on left and right foot. Active Bleeding noticed on left foot when removed dressing.    Psychiatric: She has a normal mood and affect. Her behavior is normal.       Significant Labs:   CBC:   Recent Labs   Lab 06/03/19  0416 06/04/19  0700   WBC 12.48 13.11*   HGB 12.6 12.2   HCT 42.4 41.2   * 168     CMP:   Recent Labs   Lab 06/03/19  0417 06/04/19  0700   * 134*   K 5.2* 4.0   CL 91* 97   CO2 22* 23    106   BUN 59* 42*   CREATININE 6.9* 5.5*   CALCIUM 7.2* 8.1*   PROT 7.2 7.3   ALBUMIN 2.5* 2.6*   BILITOT 0.6 0.5   ALKPHOS 89 90   AST 88* 52*   * 120*   ANIONGAP 18* 14   EGFRNONAA 6.1* 8.0*       Significant Imaging: CXR: I have reviewed all pertinent results/findings within the past 24 hours and my personal findings are:

## 2019-06-04 NOTE — PROGRESS NOTES
Maintenance HD tx initiated(reg day today tts) via laf usiing 2 15g needles w/o diff. bfr 350 achieved w/good flows noted. Lines taped andd secured and visible at all times.

## 2019-06-04 NOTE — ASSESSMENT & PLAN NOTE
Home meds: prednisone 20mg daily for 3 days then restart home dose. Conintue fludrocortisone, midodrine    - de-escalate to home steroid dose. No need for taper with only 4 days of high dose steroids  - continue fludrocortisone, midodrine

## 2019-06-04 NOTE — PLAN OF CARE
Problem: Infection (Hemodialysis)  Goal: Absence of Infection Signs/Symptoms    Intervention: Prevent or Manage Infection  Used aseptic technique on cannulation.

## 2019-06-04 NOTE — ASSESSMENT & PLAN NOTE
PT/OT consulted    - home health for PT/OT and IV abx but we will explore option of nursing home.

## 2019-06-05 NOTE — PLAN OF CARE
Spoke with Patient and son Kenan regarding need for SNF choices. Provided list within 20 miles of patient's home to bedside. Same list E-Mailed to son at [email or web address]. Son reports he will narrow facilities down by location and CMS rating and call CM in AM so referrals can be placed.

## 2019-06-05 NOTE — PLAN OF CARE
Problem: Physical Therapy Goal  Goal: Physical Therapy Goal  Goals to be met by: 6/15/19    Patient will increase functional independence with mobility by performin. Supine to sit with Stand-by Assistance - met  2. Sit to stand transfer with Supervision with AD -not met  3. Gait  x 150 feet with Supervision using Rolling Walker. -not met  4. Ascend/descend 5 stair with right Handrails Contact Guard Assistance  -not met     Outcome: Ongoing (interventions implemented as appropriate)  1 goal met. Goals remain appropriate.

## 2019-06-05 NOTE — PT/OT/SLP PROGRESS
Occupational Therapy   Treatment    Name: Sisi Medel  MRN: [id number]  Admitting Diagnosis:  Gram-negative bacteremia       Recommendations:     Discharge Recommendations: nursing facility, skilled  Discharge Equipment Recommendations:  none  Barriers to discharge:  None    Assessment:     Sisi Medel is a 58 y.o. female with a medical diagnosis of Gram-negative bacteremia.  She presents supine and with improved activity tolerance from last visit.  Pt with improved bed mobility to Min A for supine ot sit edge of bed and decreased discomfort.  Sister at bedside and states they were unable to care for pt if she went home..  Sister with many questions regarding placement options.  Extensive education on expectations for participation and importance of consistant effort for gains.  Pt admits she is with decreased mood an has allowed others to do for her what she should do.  UE therex provided with pt demonstrating good performance and motivation for participation.   Performance deficits affecting function are impaired endurance, impaired self care skills, impaired functional mobilty.     Rehab Prognosis:  Good; patient would benefit from acute skilled OT services to address these deficits and reach maximum level of function.       Plan:     Patient to be seen 3 x/week to address the above listed problems via self-care/home management, therapeutic activities, therapeutic exercises  · Plan of Care Expires: 07/01/19  · Plan of Care Reviewed with: patient, sibling    Subjective     Pain/Comfort:  · Pain Rating 1: 0/10    Objective:     Communicated with: RN prior to session.  Patient found supine with oxygen, telemetry upon OT entry to room.    General Precautions: Standard, fall   Orthopedic Precautions:N/A   Braces: N/A     Occupational Performance:     Bed Mobility:    · Patient completed Rolling/Turning to Left with  minimum assistance  · Patient completed Rolling/Turning to Right with minimum  assistance  · Patient completed Scooting/Bridging with minimum assistance  · Patient completed Supine to Sit with minimum assistance  · Patient completed Sit to Supine with minimum assistance       Activities of Daily Living:  · Feeding:  independence    · Grooming: modified independence set up  · Upper Body Dressing: minimum assistance    · Lower Body Dressing: moderate assistance    · Toileting: moderate assistance        [facility] 6 Click ADL: 19    Treatment & Education:  Pt educated on safety, role of OT, importance of increased participation in self care for gains , expectations for participation, expectations for gains, POC, energy conservation, caregiver strain. White board updated.   - UE therex/HEP  - bed mobility training    Patient left supine with all lines intactEducation:      GOALS:   Multidisciplinary Problems     Occupational Therapy Goals        Problem: Occupational Therapy Goal    Goal Priority Disciplines Outcome Interventions   Occupational Therapy Goal     OT, PT/OT Ongoing (interventions implemented as appropriate)    Description:  Goals to be met by: 6/10/2019    Patient will increase functional independence with ADLs by performing:    UE Dressing with [place].  LE Dressing with [place].  Grooming while standing with [place].  Toileting from toilet with [place] for hygiene and clothing management.                       Time Tracking:     OT Date of Treatment: 06/05/19  OT Start Time: 1035  OT Stop Time: 1100  OT Total Time (min): 25 min    Billable Minutes:Therapeutic Activity 25    Taty Barahona, GRETTA  6/5/2019

## 2019-06-05 NOTE — SUBJECTIVE & OBJECTIVE
Interval History: NAEON, patient feeling much better this morning. She is able to move around and willing to work with PT this morning. Able to ambulate which she couldn't do yesterday. PT recommended nursing facility. Patient continues to requiring oxygen but denied of fever chill, n/v, fatigue, and malaise.     Review of Systems  Objective:     Vital Signs (Most Recent):  Temp: 96.2 °F (35.7 °C) (06/05/19 1146)  Pulse: 67 (06/05/19 1146)  Resp: 16 (06/05/19 1146)  BP: 114/73 (06/05/19 1146)  SpO2: 99 % (06/05/19 1146) Vital Signs (24h Range):  Temp:  [96.2 °F (35.7 °C)-98 °F (36.7 °C)] 96.2 °F (35.7 °C)  Pulse:  [67-71] 67  Resp:  [16-20] 16  SpO2:  [96 %-100 %] 99 %  BP: (107-114)/(66-76) 114/73     Weight: 108.2 kg (238 lb 8.6 oz)  Body mass index is 36.27 kg/m².    Intake/Output Summary (Last 24 hours) at 6/5/2019 1320  Last data filed at 6/5/2019 0930  Gross per 24 hour   Intake 840 ml   Output --   Net 840 ml      Physical Exam   Constitutional: She is oriented to person, place, and time. She appears well-developed and well-nourished. No distress.   HENT:   Head: Normocephalic and atraumatic.   Neck: Normal range of motion. Neck supple.   Cardiovascular: Normal rate and regular rhythm.   Pulmonary/Chest: Effort normal. No respiratory distress. She has no rales.   Decrease air sound bilateral lower lobe   Abdominal: Soft. Bowel sounds are normal. She exhibits no distension. There is no tenderness. There is no guarding.   Musculoskeletal: Normal range of motion.   Neurological: She is alert and oriented to person, place, and time.   Skin: Skin is warm and dry. No rash noted. She is not diaphoretic. No erythema. No pallor.   Ulcers on left and right foot. Active Bleeding noticed on left foot when removed dressing.    Psychiatric: She has a normal mood and affect. Her behavior is normal.       Significant Labs:   CBC:   Recent Labs   Lab 06/04/19  0700 06/05/19  0611   WBC 13.11* 16.03*   HGB 12.2 13.8   HCT 41.2  47.5    195     CMP:   Recent Labs   Lab 06/04/19  0700 06/05/19  0610   * 131*   K 4.0 4.5   CL 97 95   CO2 23 20*    147*   BUN 42* 35*   CREATININE 5.5* 5.7*   CALCIUM 8.1* 8.6*   PROT 7.3 8.0   ALBUMIN 2.6* 2.8*   BILITOT 0.5 0.5   ALKPHOS 90 95   AST 52* 53*   * 107*   ANIONGAP 14 16   EGFRNONAA 8.0* 7.6*       Significant Imaging: I have reviewed all pertinent imaging results/findings within the past 24 hours.

## 2019-06-05 NOTE — PLAN OF CARE
Problem: Occupational Therapy Goal  Goal: Occupational Therapy Goal  Goals to be met by: 6/10/2019    Patient will increase functional independence with ADLs by performing:    UE Dressing with [place].  LE Dressing with [place].  Grooming while standing with [place].  Toileting from toilet with [place] for hygiene and clothing management.      Outcome: Ongoing (interventions implemented as appropriate)  Goals addressed and unmet.  Cont with POC  Taty Barahona OT  6/5/2019

## 2019-06-05 NOTE — PROGRESS NOTES
[facility] Hospital Medicine  Progress Note    Patient Name: Sisi Medel  MRN: [id number]  Patient Class: IP- Inpatient   Admission Date: 5/30/2019  Length of Stay: 6 days  Attending Physician: Jeanne Ortega MD  Primary Care Provider: Carlos A Caputo MD    Hospital Medicine Team: [facility] HOSP MED 5 VINAY Suh MD    Subjective:     Principal Problem:Gram-negative bacteremia    HPI:  Ms. Medel is a 58 year old lady with a past medical history of pulmonary sarcoidosis on 3-5L home oxygen, Class 3 HFrEF (3/2019 EF 35%), perm Afib/flutter s/p 6/2017 AVN ablation & PPM, ESRD (TTS), Type 2 DM (3/2019 A1c 6.2%), pulmonary HTN, YOANDY, and orthostatic hypotension who presented to [facility] ED with complaints of shortness of breath. The patient went to dialysis today and apparently could not get down to her dry weight, per neprhology should be 111kg but patient only got to 112. Per the patient her BP's were fine and she did not have any trouble with the dialysis. Upon returning to the car the patient had worsening shortness of breath and had to increase O2 to 5L. Per the patient and her family these symptoms have been persistent for several weeks. She was discharged from [facility] yesterday (5/29/19) following a 2 day admission for similar complaints. At that time the patient needed dialysis for volume overload and new dry weight was established.    Of note patient has had frequent hospitalizations in the past. These admissions are usually for volume overload, CHF, and shortness of breath. She follows with pulmonology as an outpatient and is on chronic steroids. Per last outpatient note, continued on prednisone 20mg daily for sarcoid as she has had difficulty and increased hospitalizations on lower doses.    In the ED the patient was on BiPAP, chest Xray showing significant pulmonary edema. Nephology was consulted and started dialysis in room. On evaluation patient was uncomfortable on bipap but no  increased work of breathing noted. Patient endorsing neck pain from the bipap and leg pain. She denied recent fevers or chills, but did endorse a chronic cough that had been worsening recently.    Hospital Course:  Ms. Medel was initially admitted to hospital medicine on 5/30 for increased work of breathing and pulmonary edema thought to be secondary to insufficient removal of fluid in outpatient dialysis. She was dialyzed in the ED, however her work of breathing significantly increased and she was admitted to the ICU. Her blood cultures drawn on admission grew Klebsiella oxytoca and Streptococcus salivarius, and she was started on IV Vancomycin and Zosyn by the ICU team. Vancomycin was discontinued on 6/2.  Her respiratory status improved after admission to the ICU and further dialysis, and she was stepped down to hospital medicine on 6/2. A CT Chest/abd/pelvis was ordered to evaluate for source of bacteremia but was unremarkable. Infectious disease was consulted on 6/2 and recommended two week course of ceftriaxone. She had clinically improved and was discharged home with home health and home IV antibiotics on 6/3.     Interval History: ARELY, patient feeling much better this morning. She is able to move around and willing to work with PT this morning. Able to ambulate which she couldn't do yesterday. PT recommended nursing facility. Patient continues to requiring oxygen but denied of fever chill, n/v, fatigue, and malaise.     Review of Systems  Objective:     Vital Signs (Most Recent):  Temp: 96.2 °F (35.7 °C) (06/05/19 1146)  Pulse: 67 (06/05/19 1146)  Resp: 16 (06/05/19 1146)  BP: 114/73 (06/05/19 1146)  SpO2: 99 % (06/05/19 1146) Vital Signs (24h Range):  Temp:  [96.2 °F (35.7 °C)-98 °F (36.7 °C)] 96.2 °F (35.7 °C)  Pulse:  [67-71] 67  Resp:  [16-20] 16  SpO2:  [96 %-100 %] 99 %  BP: (107-114)/(66-76) 114/73     Weight: 108.2 kg (238 lb 8.6 oz)  Body mass index is 36.27 kg/m².    Intake/Output Summary  (Last 24 hours) at 6/5/2019 1320  Last data filed at 6/5/2019 0930  Gross per 24 hour   Intake 840 ml   Output --   Net 840 ml      Physical Exam   Constitutional: She is oriented to person, place, and time. She appears well-developed and well-nourished. No distress.   HENT:   Head: Normocephalic and atraumatic.   Neck: Normal range of motion. Neck supple.   Cardiovascular: Normal rate and regular rhythm.   Pulmonary/Chest: Effort normal. No respiratory distress. She has no rales.   Decrease air sound bilateral lower lobe   Abdominal: Soft. Bowel sounds are normal. She exhibits no distension. There is no tenderness. There is no guarding.   Musculoskeletal: Normal range of motion.   Neurological: She is alert and oriented to person, place, and time.   Skin: Skin is warm and dry. No rash noted. She is not diaphoretic. No erythema. No pallor.   Ulcers on left and right foot. Active Bleeding noticed on left foot when removed dressing.    Psychiatric: She has a normal mood and affect. Her behavior is normal.       Significant Labs:   CBC:   Recent Labs   Lab 06/04/19  0700 06/05/19  0611   WBC 13.11* 16.03*   HGB 12.2 13.8   HCT 41.2 47.5    195     CMP:   Recent Labs   Lab 06/04/19  0700 06/05/19  0610   * 131*   K 4.0 4.5   CL 97 95   CO2 23 20*    147*   BUN 42* 35*   CREATININE 5.5* 5.7*   CALCIUM 8.1* 8.6*   PROT 7.3 8.0   ALBUMIN 2.6* 2.8*   BILITOT 0.5 0.5   ALKPHOS 90 95   AST 52* 53*   * 107*   ANIONGAP 14 16   EGFRNONAA 8.0* 7.6*       Significant Imaging: I have reviewed all pertinent imaging results/findings within the past 24 hours.    Assessment/Plan:      * Gram-negative bacteremia  Blood cultures: Klebsiella oxytoca and Streptococcus salivarius. Repeat BCx NGTD    CT Chest/Abd/Pelvis: Symmetric central lung hazy and streaky opacities are consistent with sarcoidosis similar with 03/07/2016.  There is probably superimposed subsegmental atelectasis at the bases.  No current  adenopathy. No intra-abdominal inflammatory process    - IV ceftriaxone x2 weeks per ID - end of therapy 6/14/2019   - midline catheter in place  - no source identified, Gastrointestinal translocation possibility.        Discharge planning issues  PT/OT consulted    - Nursing home placement referral out and IV abx will require up on discharge       Other streptococcal sepsis        Transaminitis  Suspect secondary to sepsis, has been downtrending. Biliary duct appeared normal on CT abd       Wound of right leg  - HH wound care       Paroxysmal A-fib  Home med: warfarin   INR: 2.8 on 6/3    - resume home dosing of warfarin       ESRD on hemodialysis  Nephrology consulted    - HD per nephro TuThur and Sat schedule  - daily renal labs         Acute on chronic combined systolic and diastolic heart failure  Echo 3/11/19: EF 35% with diastolic dysfunction    Home meds: aspirin, atorvastatin 80mg    - not on GDMT on admission  - BP too low chronically for ACE, BB, etc       Diabetes mellitus, type 2  Home med: tradjenta  A1c: 6.5    - resume tradjenta on discharge       Secondary adrenal insufficiency  Home meds: prednisone 20mg daily for 3 days then restart home dose. Conintue fludrocortisone, midodrine    - de-escalate to home steroid dose. No need for taper with only 4 days of high dose steroids  - continue fludrocortisone, midodrine      Epilepsy  Home med: keppra 500mg bid    - continue      Pulmonary sarcoidosis    On 4L O2 chronically    - will need outpatient follow up with pulmonology  - Continue steroid 20mg until tomorrow and switch to 10mg.       VTE Risk Mitigation (From admission, onward)        Ordered     warfarin (COUMADIN) tablet 5 mg  Daily      06/05/19 1110     warfarin tablet 7.5 mg  Daily      06/05/19 1110     IP VTE HIGH RISK PATIENT  Once      05/30/19 1803              VINAY Suh MD  Department of Hospital Medicine   [facility]

## 2019-06-05 NOTE — PT/OT/SLP PROGRESS
"Physical Therapy Treatment    Patient Name:  Sisi Medel   MRN:  [id number]    Recommendations:     Discharge Recommendations:  nursing facility, skilled (changed per discussion with cosigning therapist on pts progression and inability to safely perform stairs)  Discharge Equipment Recommendations: none   Barriers to discharge: None    Assessment:     Sisi Medel is a 58 y.o. female admitted with a medical diagnosis of Gram-negative bacteremia.  She presents with the following impairments/functional limitations:  weakness, impaired endurance, impaired self care skills, impaired functional mobilty, gait instability, impaired balance, impaired cardiopulmonary response to activity, decreased safety awareness. Pt tolerates session well with focus on bed mobility, transfers, and gait training. Pt continues to progress well but is unable to perform stair training this day. PT alerted to pts lack of progression with functional mobility as previous expected and pt/family concerns that pt may need continued therapy prior to return to home. PT in agreement that when considering pts slow progression, lack of ability to perform stairs, and recent trend of hospital readmissions, pt would benefit from SNF stay to maximize functional mobility and independence. Pt will continue to benefit from therapy services to address impairments listed above.      Rehab Prognosis: Good; patient would benefit from acute skilled PT services to address these deficits and reach maximum level of function.    Recent Surgery: * No surgery found *      Plan:     During this hospitalization, patient to be seen 4 x/week to address the identified rehab impairments via gait training, therapeutic activities and progress toward the following goals:    · Plan of Care Expires:  06/29/19    Subjective     Chief Complaint: fatigue and tingling/numbness in feet  Patient/Family Comments/goals: "I'm just worried about how to get in my house and " "get around. I'm tired of coming back to the hospital."   Pain/Comfort:  · Pain Rating 1: 0/10  · Pain Rating Post-Intervention 1: 0/10      Objective:     Communicated with NSG prior to session.  Patient found HOB elevated with oxygen, telemetry upon PTA entry to room.     General Precautions: Standard, fall   Orthopedic Precautions:N/A   Braces: N/A     Functional Mobility:  · Bed Mobility:     · Supine to Sit: stand by assistance  · Transfers:     · Sit to Stand:  minimum assistance and moderate assistance with rolling walker  · Gait: Pt ambulates 46 and 56 ft with RW and CGA/Min A. 2 short standing rests with each trial. Pt with slow merly, increased lateral lean to side of contralteral swing phase without hip hike, and FFP with downward gaze. Pt with seated rest required between trials.   · Stairs:  Pt unable to safely elevate to first step with use of single L rail with BUE support.       AM-PAC 6 CLICK MOBILITY  Turning over in bed (including adjusting bedclothes, sheets and blankets)?: 3  Sitting down on and standing up from a chair with arms (e.g., wheelchair, bedside commode, etc.): 2  Moving from lying on back to sitting on the side of the bed?: 3  Moving to and from a bed to a chair (including a wheelchair)?: 3  Need to walk in hospital room?: 3  Climbing 3-5 steps with a railing?: 2  Basic Mobility Total Score: 16       Therapeutic Activities and Exercises:   Pt assisted with functional mobility as noted above.   Pt educated on PT POC and benefits of SNF vs. HHPT per pt questioning.   PT notified of pts progression and mobility status with decision made for recommendation of SNF level of post-acute care.     Patient left sitting EOB  with all lines intact, call button in reach and NSG present.    GOALS:   Multidisciplinary Problems     Physical Therapy Goals        Problem: Physical Therapy Goal    Goal Priority Disciplines Outcome Goal Variances Interventions   Physical Therapy Goal     PT, PT/OT " Ongoing (interventions implemented as appropriate)     Description:  Goals to be met by: 6/15/19    Patient will increase functional independence with mobility by performin. Supine to sit with Stand-by Assistance - met  2. Sit to stand transfer with Supervision with AD -not met  3. Gait  x 150 feet with Supervision using Rolling Walker. -not met  4. Ascend/descend 5 stair with right Handrails Contact Guard Assistance  -not met                       Time Tracking:     PT Received On: 19  PT Start Time: 857     PT Stop Time: 925  PT Total Time (min): 28 min     Billable Minutes: Gait Training 16 and Therapeutic Activity 12    Treatment Type: Treatment  PT/PTA: PTA     PTA Visit Number: 2     Bernabe Branch PTA  2019

## 2019-06-05 NOTE — ASSESSMENT & PLAN NOTE
Blood cultures: Klebsiella oxytoca and Streptococcus salivarius. Repeat BCx NGTD    CT Chest/Abd/Pelvis: Symmetric central lung hazy and streaky opacities are consistent with sarcoidosis similar with 03/07/2016.  There is probably superimposed subsegmental atelectasis at the bases.  No current adenopathy. No intra-abdominal inflammatory process    - IV ceftriaxone x2 weeks per ID - end of therapy 6/14/2019   - midline catheter in place  - no source identified, Gastrointestinal translocation possibility.

## 2019-06-05 NOTE — PLAN OF CARE
Problem: Adult Inpatient Plan of Care  Goal: Plan of Care Review  Outcome: Ongoing (interventions implemented as appropriate)  Slept most of this shift. Generalized weakness. Assist staff with turning but moves slowly. Denies any pain. No falls. Has called using call light for assistance. VSS.

## 2019-06-05 NOTE — ASSESSMENT & PLAN NOTE
PT/OT consulted    - Nursing home placement referral out and IV abx will require up on discharge

## 2019-06-06 NOTE — PLAN OF CARE
Problem: Adult Inpatient Plan of Care  Goal: Plan of Care Review  Outcome: Ongoing (interventions implemented as appropriate)  Slept most of this shift. VSS. Anuria . No complaint voiced. No fall. Bed in low position. Call light in reach. has not been out of bed this shift.

## 2019-06-06 NOTE — PLAN OF CARE
Problem: Adult Inpatient Plan of Care  Goal: Plan of Care Review  Outcome: Ongoing (interventions implemented as appropriate)  POC reviewed with pt. AAO x4.  Pt remained free from falls.  accu-checks. HD today. 3L/NC. Questions and concerns addressed. Pt progressing towards goals. Will continue to monitor. See flow sheets for full assessment and VS

## 2019-06-06 NOTE — CARE UPDATE
Rapid Response Respiratory Therapy Proactive Rounding Note      Time of visit: 1141    Code Status: Full Code   : 1960  Age: 58 y.o.  Weight:   Wt Readings from Last 1 Encounters:   19 108.2 kg (238 lb 8.6 oz)     Sex: female  Race: Black or    Bed: 648/648 A:   MRN: [id number]    SITUATION     Evaluated patient for: proactive rounding for pt bipap use. Pt is wearing Nasal cannula 3 lpm at this time    BACKGROUND     Patient has a past medical history of Anemia in ESRD (end-stage renal disease), Anticoagulant long-term use, Cervical radiculopathy, CHF (congestive heart failure), Chronic combined systolic and diastolic heart failure, Chronic respiratory failure with hypoxia, Closed fracture of proximal end of right fibula, Complications due to renal dialysis device, implant, and graft, DDD (degenerative disc disease), lumbar, Dependence on renal dialysis, Diabetes mellitus type II, controlled, ESRD on hemodialysis, Essential hypertension, Gout, Lateral meniscal tear, Lumbar stenosis, Obesity, Class III, BMI 40-49.9 (morbid obesity), Pacemaker, Paroxysmal atrial fibrillation, Peripheral neuropathy, Personal history of gastric ulcer, Pneumonia of left lower lobe due to infectious organism, Sarcoidosis, lung, Secondary pulmonary hypertension, Seizure disorder, Shingles, and Thyroid disease.  Clinically Significant Surgical Hx: None    ASSESSMENT     Pulse: Pulse: 70 Respiratory rate: Resp: 16 Temperature: Temp: 97.2 °F (36.2 °C) BP: BP: 113/62 SpO2:SpO2: (!) 94 %   Level of Consciousness: Level of Consciousness (AVPU): alert  Respiratory Effort: Respiratory Effort: Unlabored, Normal  Expansion/Accessory Muscle Usage: Expansion/Accessory Muscles/Retractions: no use of accessory muscles  All Lung Field Breath Sounds: All Lung Fields Breath Sounds: Anterior:, Posterior:, coarse  AUGUST Breath Sounds: crackles, coarse  LLL Breath Sounds: diminished  RUL Breath Sounds: coarse  RML Breath  Sounds: coarse  RLL Breath Sounds: diminished  Cough Type: Cough Type: nonproductive  Mobility at time of assessment: General Mobility: generalized weakness  O2 Device/Concentration: O2 Device (Oxygen Therapy): nasal cannula   Flow (L/min): 3 Oxygen Concentration (%): 32  Most recent blood gas: No results for input(s): PH, PCO2, PO2, HCO3, POCSATURATED, BE in the last 72 hours.    Current Respiratory Care Orders: IS Q4, Cpap QHS, Duoneb Q4PRN  NIPPV: Yes, Type: cpap Settings: 8  Surgical airway: No  ETCO2 monitored: ETCO2 (mmHg): 0 mmHg  Ambu at bedside: Ambu bag with the patient?: Yes, Adult Ambu    INTERVENTIONS/RECOMMENDATIONS   ?   pt stated she is not wearing CPAP, Spoke with nurse and she stated will contact team to let them know pt is refusing to wear CPAP    Discussed plan of care with Priyanka RENTERIA and primary RT, IOANA Zuniga.     FOLLOW-UP     Please call back the Rapid Response RT, Kymberly Negron, CRT at x [phone] for any questions or concerns.

## 2019-06-06 NOTE — PLAN OF CARE
CM placed call to Patient's son regarding choices for SNF care. No Answer, message left. Awaiting call back.

## 2019-06-06 NOTE — PLAN OF CARE
06/06/19 1417   Post-Acute Status   Post-Acute Authorization Placement   Discharge Delays (!) Patient and Family Barriers   CM unable to speak with family to obtain choice , consult SNF. SW will continue to follow

## 2019-06-06 NOTE — PLAN OF CARE
SANJU placed another call to son requesting choices for SNF placement. Son answered and informed SANJU he was en route to facility and had narrowed the list down to 13 choices. He declined to provide even one choice so that referrals could be placed stating he needed to discuss list with patient and additional family members. Son agreed to phone SANJU in AM on 6/7/19 to discuss choices.

## 2019-06-06 NOTE — ASSESSMENT & PLAN NOTE
Home meds: prednisone 20mg daily for 3 days then restart home dose. Conintue fludrocortisone, midodrine    - Prednisone 10mg starting 6/7/19   - de-escalate to home steroid dose. No need for taper with only 4 days of high dose steroids  - continue fludrocortisone, midodrine

## 2019-06-06 NOTE — PROGRESS NOTES
Maintenance hd regular 3.5 hour tts day tx initiated via luag cannulated w/2 15 g needles w/o diff. Lines taped secured and visible at all times.

## 2019-06-06 NOTE — PLAN OF CARE
Problem: Adult Inpatient Plan of Care  Goal: Plan of Care Review  Outcome: Ongoing (interventions implemented as appropriate)  bld returned tx ended 30 min early d/t pt c/o of pain. IVETH Tao present and order to d/c early noted. Pt ran for 185 min. Removed net 2L dialyzer cleared fair. Needles removed bleeding x5 min each pressure dsg applied after. vss nad report given and transported to room via bed pt denies c/o

## 2019-06-06 NOTE — PLAN OF CARE
Problem: Infection (Hemodialysis)  Goal: Absence of Infection Signs/Symptoms    Intervention: Prevent or Manage Infection  Aseptic technique used to cannulate left arm graft.

## 2019-06-06 NOTE — PROGRESS NOTES
[facility] HEMODIALYSIS NOTE     Patient currently on hemodialysis for removal of uremic toxins and volume.     Patient seen and evaluated on hemodialysis, tolerating treatment, see HD flowsheet for vitals and assessments.     Ultrafiltration goal is 3L as tolerated, keep map >65     Labs have been reviewed and the dialysate bath has been adjusted.     Assessment/Plan:     -Patient seen on HD, tolerating treatment well, w/o complaints on 3L nasal canula  -Will attempt to establish a new DW before discharge   -Renal diet  -Strict I/O's and daily weights  -Daily renal function panels  -Hbg 13.5, within goal of >10, no need for Epo at this time   -Phos 5.6, continue renvela with meals   -Will continue to follow while inpatient         AYAN Levine, AGNP-C  Nephrology  Pager:  [phone]

## 2019-06-06 NOTE — ASSESSMENT & PLAN NOTE
On 4L O2 chronically    - will need outpatient follow up with pulmonology  - Continue steroid and transition to 10mg on 6/7/19

## 2019-06-06 NOTE — SUBJECTIVE & OBJECTIVE
Interval History: No acute event overnight, patient continues to feeling good. No complain at this point. Patient receiving dialysis this morning. Patient is waiting on placement.     Review of Systems   Constitutional: Positive for fatigue. Negative for chills and fever.   HENT: Negative for rhinorrhea, sneezing and sore throat.    Eyes: Negative for visual disturbance.   Respiratory: Positive for cough (nonproductive). Negative for chest tightness, shortness of breath and wheezing.    Cardiovascular: Negative for chest pain, palpitations and leg swelling.   Gastrointestinal: Negative for abdominal distention, abdominal pain, constipation, diarrhea, nausea and vomiting.   Genitourinary: Negative for dysuria, flank pain and hematuria.   Musculoskeletal: Positive for myalgias. Negative for arthralgias, back pain, neck pain and neck stiffness.   Skin: Negative for color change and rash.   Neurological: Positive for weakness. Negative for dizziness, light-headedness and headaches.   Psychiatric/Behavioral: Negative for agitation, behavioral problems, confusion and decreased concentration.     Objective:     Vital Signs (Most Recent):  Temp: 97.5 °F (36.4 °C) (06/06/19 0750)  Pulse: 70 (06/06/19 1000)  Resp: 16 (06/06/19 1000)  BP: (!) 94/58 (06/06/19 1000)  SpO2: 100 % (06/06/19 0713) Vital Signs (24h Range):  Temp:  [96.1 °F (35.6 °C)-98.5 °F (36.9 °C)] 97.5 °F (36.4 °C)  Pulse:  [65-72] 70  Resp:  [16-20] 16  SpO2:  [97 %-100 %] 100 %  BP: ()/(52-89) 94/58     Weight: 108.2 kg (238 lb 8.6 oz)  Body mass index is 36.27 kg/m².    Intake/Output Summary (Last 24 hours) at 6/6/2019 1126  Last data filed at 6/5/2019 2100  Gross per 24 hour   Intake 560 ml   Output --   Net 560 ml      Physical Exam   Constitutional: She is oriented to person, place, and time. She appears well-developed and well-nourished. No distress.   HENT:   Head: Normocephalic and atraumatic.   Neck: Normal range of motion. Neck supple.    Cardiovascular: Normal rate and regular rhythm.   Pulmonary/Chest: Effort normal and breath sounds normal. No respiratory distress. She has no rales.   Abdominal: Soft. Bowel sounds are normal. She exhibits no distension. There is no tenderness. There is no guarding.   Musculoskeletal: Normal range of motion.   Neurological: She is alert and oriented to person, place, and time.   Skin: Skin is warm and dry. No rash noted. She is not diaphoretic. No erythema. No pallor.   Ulcers on left and right foot. Active Bleeding noticed on left foot when removed dressing.    Psychiatric: She has a normal mood and affect. Her behavior is normal.       Significant Labs:   CBC:   Recent Labs   Lab 06/05/19  0611 06/06/19  0601   WBC 16.03* 15.86*   HGB 13.8 13.5   HCT 47.5 44.7    244     CMP:   Recent Labs   Lab 06/05/19  0610 06/06/19  0601   * 130*   K 4.5 4.2   CL 95 93*   CO2 20* 19*   * 113*   BUN 35* 51*   CREATININE 5.7* 8.2*   CALCIUM 8.6* 8.1*   PROT 8.0 7.8   ALBUMIN 2.8* 2.9*   BILITOT 0.5 0.4   ALKPHOS 95 96   AST 53* 36   * 88*   ANIONGAP 16 18*   EGFRNONAA 7.6* 4.9*       Significant Imaging: I have reviewed all pertinent imaging results/findings within the past 24 hours.

## 2019-06-06 NOTE — PROGRESS NOTES
[facility] Hospital Medicine  Progress Note    Patient Name: Sisi Medel  MRN: [id number]  Patient Class: IP- Inpatient   Admission Date: 5/30/2019  Length of Stay: 7 days  Attending Physician: Jeanne Ortega MD  Primary Care Provider: Carlos A Caputo MD    Hospital Medicine Team: [facility] 5 VINAY Suh MD    Subjective:     Principal Problem:Gram-negative bacteremia    HPI:  Ms. Medel is a 58 year old lady with a past medical history of pulmonary sarcoidosis on 3-5L home oxygen, Class 3 HFrEF (3/2019 EF 35%), perm Afib/flutter s/p 6/2017 AVN ablation & PPM, ESRD (TTS), Type 2 DM (3/2019 A1c 6.2%), pulmonary HTN, YOANDY, and orthostatic hypotension who presented to [facility] with complaints of shortness of breath. The patient went to dialysis today and apparently could not get down to her dry weight, per neprhology should be 111kg but patient only got to 112. Per the patient her BP's were fine and she did not have any trouble with the dialysis. Upon returning to the car the patient had worsening shortness of breath and had to increase O2 to 5L. Per the patient and her family these symptoms have been persistent for several weeks. She was discharged from [facility] yesterday (5/29/19) following a 2 day admission for similar complaints. At that time the patient needed dialysis for volume overload and new dry weight was established.    Of note patient has had frequent hospitalizations in the past. These admissions are usually for volume overload, CHF, and shortness of breath. She follows with pulmonology as an outpatient and is on chronic steroids. Per last outpatient note, continued on prednisone 20mg daily for sarcoid as she has had difficulty and increased hospitalizations on lower doses.    In the ED the patient was on BiPAP, chest Xray showing significant pulmonary edema. Nephology was consulted and started dialysis in room. On evaluation patient was uncomfortable on bipap but no  increased work of breathing noted. Patient endorsing neck pain from the bipap and leg pain. She denied recent fevers or chills, but did endorse a chronic cough that had been worsening recently.    Hospital Course:  Ms. Medel was initially admitted to hospital medicine on 5/30 for increased work of breathing and pulmonary edema thought to be secondary to insufficient removal of fluid in outpatient dialysis. She was dialyzed in the ED, however her work of breathing significantly increased and she was admitted to the ICU. Her blood cultures drawn on admission grew Klebsiella oxytoca and Streptococcus salivarius, and she was started on IV Vancomycin and Zosyn by the ICU team. Vancomycin was discontinued on 6/2.  Her respiratory status improved after admission to the ICU and further dialysis, and she was stepped down to hospital medicine on 6/2. A CT Chest/abd/pelvis was ordered to evaluate for source of bacteremia but was unremarkable. Infectious disease was consulted on 6/2 and recommended two week course of ceftriaxone. She had clinically improved and was discharged home with home health and home IV antibiotics on 6/3.     Interval History: No acute event overnight, patient continues to feeling good. No complain at this point. Patient receiving dialysis this morning. Patient is waiting on placement.     Review of Systems   Constitutional: Positive for fatigue. Negative for chills and fever.   HENT: Negative for rhinorrhea, sneezing and sore throat.    Eyes: Negative for visual disturbance.   Respiratory: Positive for cough (nonproductive). Negative for chest tightness, shortness of breath and wheezing.    Cardiovascular: Negative for chest pain, palpitations and leg swelling.   Gastrointestinal: Negative for abdominal distention, abdominal pain, constipation, diarrhea, nausea and vomiting.   Genitourinary: Negative for dysuria, flank pain and hematuria.   Musculoskeletal: Positive for myalgias. Negative for  arthralgias, back pain, neck pain and neck stiffness.   Skin: Negative for color change and rash.   Neurological: Positive for weakness. Negative for dizziness, light-headedness and headaches.   Psychiatric/Behavioral: Negative for agitation, behavioral problems, confusion and decreased concentration.     Objective:     Vital Signs (Most Recent):  Temp: 97.5 °F (36.4 °C) (06/06/19 0750)  Pulse: 70 (06/06/19 1000)  Resp: 16 (06/06/19 1000)  BP: (!) 94/58 (06/06/19 1000)  SpO2: 100 % (06/06/19 0713) Vital Signs (24h Range):  Temp:  [96.1 °F (35.6 °C)-98.5 °F (36.9 °C)] 97.5 °F (36.4 °C)  Pulse:  [65-72] 70  Resp:  [16-20] 16  SpO2:  [97 %-100 %] 100 %  BP: ()/(52-89) 94/58     Weight: 108.2 kg (238 lb 8.6 oz)  Body mass index is 36.27 kg/m².    Intake/Output Summary (Last 24 hours) at 6/6/2019 1126  Last data filed at 6/5/2019 2100  Gross per 24 hour   Intake 560 ml   Output --   Net 560 ml      Physical Exam   Constitutional: She is oriented to person, place, and time. She appears well-developed and well-nourished. No distress.   HENT:   Head: Normocephalic and atraumatic.   Neck: Normal range of motion. Neck supple.   Cardiovascular: Normal rate and regular rhythm.   Pulmonary/Chest: Effort normal and breath sounds normal. No respiratory distress. She has no rales.   Abdominal: Soft. Bowel sounds are normal. She exhibits no distension. There is no tenderness. There is no guarding.   Musculoskeletal: Normal range of motion.   Neurological: She is alert and oriented to person, place, and time.   Skin: Skin is warm and dry. No rash noted. She is not diaphoretic. No erythema. No pallor.   Ulcers on left and right foot. Active Bleeding noticed on left foot when removed dressing.    Psychiatric: She has a normal mood and affect. Her behavior is normal.       Significant Labs:   CBC:   Recent Labs   Lab 06/05/19  0611 06/06/19  0601   WBC 16.03* 15.86*   HGB 13.8 13.5   HCT 47.5 44.7    244     CMP:   Recent  Labs   Lab 06/05/19  0610 06/06/19  0601   * 130*   K 4.5 4.2   CL 95 93*   CO2 20* 19*   * 113*   BUN 35* 51*   CREATININE 5.7* 8.2*   CALCIUM 8.6* 8.1*   PROT 8.0 7.8   ALBUMIN 2.8* 2.9*   BILITOT 0.5 0.4   ALKPHOS 95 96   AST 53* 36   * 88*   ANIONGAP 16 18*   EGFRNONAA 7.6* 4.9*       Significant Imaging: I have reviewed all pertinent imaging results/findings within the past 24 hours.    Assessment/Plan:      * Gram-negative bacteremia  Blood cultures: Klebsiella oxytoca and Streptococcus salivarius. Repeat BCx NGTD    CT Chest/Abd/Pelvis: Symmetric central lung hazy and streaky opacities are consistent with sarcoidosis similar with 03/07/2016.  There is probably superimposed subsegmental atelectasis at the bases.  No current adenopathy. No intra-abdominal inflammatory process    - IV ceftriaxone x2 weeks per ID - end of therapy 6/14/2019   - midline catheter in place  - no source identified, Gastrointestinal translocation possibility.        Discharge planning issues  PT/OT consulted    - Nursing home placement referral out and IV abx will require up on discharge       Other streptococcal sepsis        Transaminitis  Suspect secondary to sepsis, has been downtrending. Biliary duct appeared normal on CT abd       Wound of right leg  - HH wound care       Paroxysmal A-fib  Home med: warfarin   INR: 2.8 on 6/3    - resume home dosing of warfarin       ESRD on hemodialysis  Nephrology consulted    - HD per nephro TuThur and Sat schedule  - daily renal labs          Acute on chronic combined systolic and diastolic heart failure  Echo 3/11/19: EF 35% with diastolic dysfunction    Home meds: aspirin, atorvastatin 80mg    - not on GDMT on admission  - BP too low chronically for ACE, BB, etc       Diabetes mellitus, type 2  Home med: tradjenta  A1c: 6.5    - resume tradjenta on discharge    - Stable while in hospital       Secondary adrenal insufficiency  Home meds: prednisone 20mg daily for 3 days  then restart home dose. Conintue fludrocortisone, midodrine    - Prednisone 10mg starting 6/7/19   - de-escalate to home steroid dose. No need for taper with only 4 days of high dose steroids  - continue fludrocortisone, midodrine      Epilepsy  Home med: keppra 500mg bid    - continue      Pulmonary sarcoidosis    On 4L O2 chronically    - will need outpatient follow up with pulmonology  - Continue steroid and transition to 10mg on 6/7/19      VTE Risk Mitigation (From admission, onward)        Ordered     warfarin tablet 7.5 mg  Daily      06/06/19 1055     IP VTE HIGH RISK PATIENT  Once      05/30/19 5263              VINAY Suh MD  Department of Hospital Medicine   [facility]

## 2019-06-07 NOTE — PLAN OF CARE
CM received E-Mail from Patient's son Kenan regarding SNF choices. Referrals placed to [facility], [facility], [facility] per patient and family request.

## 2019-06-07 NOTE — SUBJECTIVE & OBJECTIVE
Interval History: Patient feeling tired this morning. Yesterday patient c/o post dialysis abdominal cramp and nausea. This morning patient denied of abdominal cramp and nausea. Patient does not have issue with breathing and wanting to work with physical therapy to get stronger. Patient's son is looking into SNF facility.     Review of Systems   Constitutional: Positive for fatigue. Negative for chills and fever.   HENT: Negative for rhinorrhea, sneezing and sore throat.    Eyes: Negative for visual disturbance.   Respiratory: Positive for cough (nonproductive). Negative for chest tightness, shortness of breath and wheezing.    Cardiovascular: Negative for chest pain, palpitations and leg swelling.   Gastrointestinal: Negative for abdominal distention, abdominal pain, constipation, diarrhea, nausea and vomiting.   Genitourinary: Negative for dysuria, flank pain and hematuria.   Musculoskeletal: Positive for myalgias. Negative for arthralgias, back pain, neck pain and neck stiffness.   Skin: Negative for color change and rash.   Neurological: Positive for weakness. Negative for dizziness, light-headedness and headaches.   Psychiatric/Behavioral: Negative for agitation, behavioral problems, confusion and decreased concentration.     Objective:     Vital Signs (Most Recent):  Temp: 97.5 °F (36.4 °C) (06/07/19 0739)  Pulse: 69 (06/07/19 0739)  Resp: (!) 22 (06/07/19 0739)  BP: 115/79 (06/07/19 0739)  SpO2: 100 % (06/07/19 0739) Vital Signs (24h Range):  Temp:  [96.5 °F (35.8 °C)-98.1 °F (36.7 °C)] 97.5 °F (36.4 °C)  Pulse:  [65-72] 69  Resp:  [16-22] 22  SpO2:  [94 %-100 %] 100 %  BP: ()/(52-83) 115/79     Weight: 108.2 kg (238 lb 8.6 oz)  Body mass index is 36.27 kg/m².    Intake/Output Summary (Last 24 hours) at 6/7/2019 0829  Last data filed at 6/7/2019 0637  Gross per 24 hour   Intake 750 ml   Output 2567 ml   Net -1817 ml      Physical Exam   Constitutional: She is oriented to person, place, and time. She  appears well-developed and well-nourished. No distress.   HENT:   Head: Normocephalic and atraumatic.   Neck: Normal range of motion. Neck supple.   Cardiovascular: Normal rate and regular rhythm.   Pulmonary/Chest: Effort normal and breath sounds normal. No respiratory distress. She has no rales.   Abdominal: Soft. Bowel sounds are normal. She exhibits no distension. There is no tenderness. There is no guarding.   Musculoskeletal: Normal range of motion.   Neurological: She is alert and oriented to person, place, and time.   Skin: Skin is warm and dry. No rash noted. She is not diaphoretic. No erythema. No pallor.   Ulcers on left and right foot.   Psychiatric: She has a normal mood and affect. Her behavior is normal.       Significant Labs:   CBC:   Recent Labs   Lab 06/06/19  0601 06/07/19  0410   WBC 15.86* 17.39*   HGB 13.5 15.3   HCT 44.7 49.9*    277     CMP:   Recent Labs   Lab 06/06/19  0601 06/07/19  0410   * 130*   K 4.2 4.8   CL 93* 91*   CO2 19* 19*   * 166*   BUN 51* 38*   CREATININE 8.2* 6.4*   CALCIUM 8.1* 8.7   PROT 7.8 8.5*   ALBUMIN 2.9* 3.2*   BILITOT 0.4 0.5   ALKPHOS 96 120   AST 36 45*   ALT 88* 88*   ANIONGAP 18* 20*   EGFRNONAA 4.9* 6.6*       Significant Imaging: I have reviewed all pertinent imaging results/findings within the past 24 hours.

## 2019-06-07 NOTE — PROGRESS NOTES
[facility] Hospital Medicine  Progress Note    Patient Name: Sisi Medel  MRN: [id number]  Patient Class: IP- Inpatient   Admission Date: 5/30/2019  Length of Stay: 8 days  Attending Physician: Jeanne Ortega MD  Primary Care Provider: Carlos A Caputo MD    Hospital Medicine Team: [facility] MED 5 VINAY Suh MD    Subjective:     Principal Problem:Gram-negative bacteremia    HPI:  Ms. Medel is a 58 year old lady with a past medical history of pulmonary sarcoidosis on 3-5L home oxygen, Class 3 HFrEF (3/2019 EF 35%), perm Afib/flutter s/p 6/2017 AVN ablation & PPM, ESRD (TTS), Type 2 DM (3/2019 A1c 6.2%), pulmonary HTN, YOANDY, and orthostatic hypotension who presented to [facility] ED with complaints of shortness of breath. The patient went to dialysis today and apparently could not get down to her dry weight, per neprhology should be 111kg but patient only got to 112. Per the patient her BP's were fine and she did not have any trouble with the dialysis. Upon returning to the car the patient had worsening shortness of breath and had to increase O2 to 5L. Per the patient and her family these symptoms have been persistent for several weeks. She was discharged from [facility] yesterday (5/29/19) following a 2 day admission for similar complaints. At that time the patient needed dialysis for volume overload and new dry weight was established.    Of note patient has had frequent hospitalizations in the past. These admissions are usually for volume overload, CHF, and shortness of breath. She follows with pulmonology as an outpatient and is on chronic steroids. Per last outpatient note, continued on prednisone 20mg daily for sarcoid as she has had difficulty and increased hospitalizations on lower doses.    In the ED the patient was on BiPAP, chest Xray showing significant pulmonary edema. Nephology was consulted and started dialysis in room. On evaluation patient was uncomfortable on bipap but no  increased work of breathing noted. Patient endorsing neck pain from the bipap and leg pain. She denied recent fevers or chills, but did endorse a chronic cough that had been worsening recently.    Hospital Course:  Ms. Medel was initially admitted to hospital medicine on 5/30 for increased work of breathing and pulmonary edema thought to be secondary to insufficient removal of fluid in outpatient dialysis. She was dialyzed in the ED, however her work of breathing significantly increased and she was admitted to the ICU. Her blood cultures drawn on admission grew Klebsiella oxytoca and Streptococcus salivarius, and she was started on IV Vancomycin and Zosyn by the ICU team. Vancomycin was discontinued on 6/2.  Her respiratory status improved after admission to the ICU and further dialysis, and she was stepped down to hospital medicine on 6/2. A CT Chest/abd/pelvis was ordered to evaluate for source of bacteremia but was unremarkable. Infectious disease was consulted on 6/2 and recommended two week course of ceftriaxone. She had clinically improved and was discharged home with home health and home IV antibiotics on 6/3.     Interval History: Patient feeling tired this morning. Yesterday patient c/o post dialysis abdominal cramp and nausea. This morning patient denied of abdominal cramp and nausea. Patient does not have issue with breathing and wanting to work with physical therapy to get stronger. Patient's son is looking into SNF facility.     Review of Systems   Constitutional: Positive for fatigue. Negative for chills and fever.   HENT: Negative for rhinorrhea, sneezing and sore throat.    Eyes: Negative for visual disturbance.   Respiratory: Positive for cough (nonproductive). Negative for chest tightness, shortness of breath and wheezing.    Cardiovascular: Negative for chest pain, palpitations and leg swelling.   Gastrointestinal: Negative for abdominal distention, abdominal pain, constipation, diarrhea,  nausea and vomiting.   Genitourinary: Negative for dysuria, flank pain and hematuria.   Musculoskeletal: Positive for myalgias. Negative for arthralgias, back pain, neck pain and neck stiffness.   Skin: Negative for color change and rash.   Neurological: Positive for weakness. Negative for dizziness, light-headedness and headaches.   Psychiatric/Behavioral: Negative for agitation, behavioral problems, confusion and decreased concentration.     Objective:     Vital Signs (Most Recent):  Temp: 97.5 °F (36.4 °C) (06/07/19 0739)  Pulse: 69 (06/07/19 0739)  Resp: (!) 22 (06/07/19 0739)  BP: 115/79 (06/07/19 0739)  SpO2: 100 % (06/07/19 0739) Vital Signs (24h Range):  Temp:  [96.5 °F (35.8 °C)-98.1 °F (36.7 °C)] 97.5 °F (36.4 °C)  Pulse:  [65-72] 69  Resp:  [16-22] 22  SpO2:  [94 %-100 %] 100 %  BP: ()/(52-83) 115/79     Weight: 108.2 kg (238 lb 8.6 oz)  Body mass index is 36.27 kg/m².    Intake/Output Summary (Last 24 hours) at 6/7/2019 0829  Last data filed at 6/7/2019 0637  Gross per 24 hour   Intake 750 ml   Output 2567 ml   Net -1817 ml      Physical Exam   Constitutional: She is oriented to person, place, and time. She appears well-developed and well-nourished. No distress.   HENT:   Head: Normocephalic and atraumatic.   Neck: Normal range of motion. Neck supple.   Cardiovascular: Normal rate and regular rhythm.   Pulmonary/Chest: Effort normal and breath sounds normal. No respiratory distress. She has no rales.   Abdominal: Soft. Bowel sounds are normal. She exhibits no distension. There is no tenderness. There is no guarding.   Musculoskeletal: Normal range of motion.   Neurological: She is alert and oriented to person, place, and time.   Skin: Skin is warm and dry. No rash noted. She is not diaphoretic. No erythema. No pallor.   Ulcers on left and right foot.   Psychiatric: She has a normal mood and affect. Her behavior is normal.       Significant Labs:   CBC:   Recent Labs   Lab 06/06/19  0601  06/07/19  0410   WBC 15.86* 17.39*   HGB 13.5 15.3   HCT 44.7 49.9*    277     CMP:   Recent Labs   Lab 06/06/19  0601 06/07/19  0410   * 130*   K 4.2 4.8   CL 93* 91*   CO2 19* 19*   * 166*   BUN 51* 38*   CREATININE 8.2* 6.4*   CALCIUM 8.1* 8.7   PROT 7.8 8.5*   ALBUMIN 2.9* 3.2*   BILITOT 0.4 0.5   ALKPHOS 96 120   AST 36 45*   ALT 88* 88*   ANIONGAP 18* 20*   EGFRNONAA 4.9* 6.6*       Significant Imaging: I have reviewed all pertinent imaging results/findings within the past 24 hours.    Assessment/Plan:      * Gram-negative bacteremia  Blood cultures: Klebsiella oxytoca and Streptococcus salivarius. Repeat BCx NGTD    CT Chest/Abd/Pelvis: Symmetric central lung hazy and streaky opacities are consistent with sarcoidosis similar with 03/07/2016.  There is probably superimposed subsegmental atelectasis at the bases.  No current adenopathy. No intra-abdominal inflammatory process    - IV ceftriaxone x2 weeks per ID - end of therapy 6/14/2019   - midline catheter in place  - no source identified, Gastrointestinal translocation possibility.         Discharge planning issues  PT/OT consulted    - Nursing home placement referral out and IV abx will require up on discharge       Other streptococcal sepsis        Transaminitis  Suspect secondary to sepsis, has been downtrending. Biliary duct appeared normal on CT abd       Wound of right leg  - HH wound care       Paroxysmal A-fib  Home med: warfarin   INR: 2.8 on 6/3    - resume home dosing of warfarin       ESRD on hemodialysis  Nephrology consulted    - HD per nephro Betina and Sat schedule  - daily renal labs          Acute on chronic combined systolic and diastolic heart failure  Echo 3/11/19: EF 35% with diastolic dysfunction    Home meds: aspirin, atorvastatin 80mg    - not on GDMT on admission  - BP too low chronically for ACE, BB, etc       Diabetes mellitus, type 2  Home med: tradjenta  A1c: 6.5    - resume tradjenta on discharge    - Stable  while in hospital       Secondary adrenal insufficiency  Home meds: prednisone 20mg daily for 3 days then restart home dose. Conintue fludrocortisone, midodrine    - Prednisone 10mg starting 6/7/19   - de-escalate to home steroid dose. No need for taper with only 4 days of high dose steroids  - continue fludrocortisone, midodrine      Epilepsy  Home med: keppra 500mg bid    - continue      Pulmonary sarcoidosis    On 4L O2 chronically    - will need outpatient follow up with pulmonology  - Continue steroid 10mg       VTE Risk Mitigation (From admission, onward)        Ordered     warfarin tablet 7.5 mg  Daily      06/06/19 1055     IP VTE HIGH RISK PATIENT  Once      05/30/19 1803              VINAY Suh MD  Department of Hospital Medicine   [facility]

## 2019-06-07 NOTE — ASSESSMENT & PLAN NOTE
On 4L O2 chronically    - will need outpatient follow up with pulmonology  - Continue steroid 10mg

## 2019-06-07 NOTE — PLAN OF CARE
06/07/19 0957   Post-Acute Status   Post-Acute Authorization Placement   Home Health/Hospice Status Family Barriers   Per note from [facility] , no beds available until Monday, SW called pt son to obtain choices but no answer. SW left message.

## 2019-06-07 NOTE — PLAN OF CARE
CM expecting call around 11AM from son as per his E-Mail. No call received. CM placed call, no answer. Message left reminding son the importance of providing choices for patient's SNF placement. Will continue to monitor.

## 2019-06-07 NOTE — PLAN OF CARE
Problem: Adult Inpatient Plan of Care  Goal: Plan of Care Review  Outcome: Ongoing (interventions implemented as appropriate)     06/07/19 4877   Plan of Care Review   Plan of Care Reviewed With patient     Pt remain free from fall and injuries. C/O nausea and ABD cramp managed with meds, effective voiced. Pt rested well over the night. VSS, Denies CPAP. Safety maintained. Will monitor.

## 2019-06-07 NOTE — PLAN OF CARE
Problem: Adult Inpatient Plan of Care  Goal: Plan of Care Review  Outcome: Ongoing (interventions implemented as appropriate)  POC reviewed with pt. AAO x4. OOBTC with PT today. accu-checks.   Pt remained free from falls. Questions and concerns addressed. Pt progressing towards goals. Will continue to monitor. See flow sheets for full assessment and VS

## 2019-06-07 NOTE — PLAN OF CARE
CM expecting call per son regarding providing SNF choices. Received E-mail at 10am from  son stating he will call in one hour. Awaiting call back regarding choices. List provided to family on 6/5/19.

## 2019-06-07 NOTE — PLAN OF CARE
Problem: Physical Therapy Goal  Goal: Physical Therapy Goal  Goals to be met by: 6/15/19    Patient will increase functional independence with mobility by performin. Supine to sit with Stand-by Assistance - met  2. Sit to stand transfer with Supervision with AD -not met  3. Gait  x 150 feet with Supervision using Rolling Walker. -not met  4. Ascend/descend 5 stair with right Handrails Contact Guard Assistance  -not met      Outcome: Ongoing (interventions implemented as appropriate)  Goals remain appropriate.

## 2019-06-07 NOTE — PLAN OF CARE
Problem: Occupational Therapy Goal  Goal: Occupational Therapy Goal  Goals to be met by: 6/10/2019    Patient will increase functional independence with ADLs by performing:    UE Dressing with [place].  LE Dressing with [place].  Grooming while standing with [place].  Toileting from toilet with [place] for hygiene and clothing management.      Outcome: Ongoing (interventions implemented as appropriate)  Goals addressed and unmet.  Cont with POC  Taty Barahona OT  6/7/2019

## 2019-06-07 NOTE — PT/OT/SLP PROGRESS
"Physical Therapy Treatment    Patient Name:  Sisi Medel   MRN:  [id number]    Recommendations:     Discharge Recommendations:  nursing facility, skilled   Discharge Equipment Recommendations: none   Barriers to discharge: None    Assessment:     Sisi Medel is a 58 y.o. female admitted with a medical diagnosis of Gram-negative bacteremia.  She presents with the following impairments/functional limitations:  weakness, impaired endurance, impaired self care skills, impaired functional mobilty, gait instability, impaired balance, decreased lower extremity function, impaired cardiopulmonary response to activity, decreased safety awareness. Pt tolerated session fairly with focus on transfers and gait training. Pt progression limited this session by pt c/o nausea and weakness. Pt appears lethargic and SOB with exertion. Pt requires increased seated rest breaks and gait distance is drastically shorter than her performance in previous session. Pt will continue to benefit from therapy services to address impairments listed above.     Rehab Prognosis: Good; patient would benefit from acute skilled PT services to address these deficits and reach maximum level of function.    Recent Surgery: * No surgery found *      Plan:     During this hospitalization, patient to be seen 4 x/week to address the identified rehab impairments via gait training, therapeutic activities and progress toward the following goals:    · Plan of Care Expires:  06/29/19    Subjective     Chief Complaint: no c/o  Patient/Family Comments/goals: "I   Pain/Comfort:  · Pain Rating 1: 0/10  · Pain Rating Post-Intervention 1: 0/10      Objective:     Communicated with NSG prior to session.  Patient found up in chair with oxygen, telemetry upon PTA entry to room.     General Precautions: Standard, fall   Orthopedic Precautions:N/A   Braces: N/A     Functional Mobility:  · Transfers:     · Sit to Stand:  moderate assistance and maximal " assistance with rolling walker  · Bed to Chair: minimum assistance with  rolling walker  using  Step Transfer  · Gait: Pt ambulates 6 ft and 12 ft with RW and Min A. Pt very lethargic and weak this date with decreased balance and instability noted.       AM-PAC 6 CLICK MOBILITY  Turning over in bed (including adjusting bedclothes, sheets and blankets)?: 3  Sitting down on and standing up from a chair with arms (e.g., wheelchair, bedside commode, etc.): 2  Moving from lying on back to sitting on the side of the bed?: 3  Moving to and from a bed to a chair (including a wheelchair)?: 3  Need to walk in hospital room?: 3  Climbing 3-5 steps with a railing?: 2  Basic Mobility Total Score: 16       Therapeutic Activities and Exercises:   Pt assisted with functional mobility as noted above.   Pt requires increased rest breaks this day after all mobility d/t increased SOB with exertion and c/o nausea.     Patient left up in chair with all lines intact and call button in reach. [employer] camera present.     GOALS:   Multidisciplinary Problems     Physical Therapy Goals        Problem: Physical Therapy Goal    Goal Priority Disciplines Outcome Goal Variances Interventions   Physical Therapy Goal     PT, PT/OT Ongoing (interventions implemented as appropriate)     Description:  Goals to be met by: 6/15/19    Patient will increase functional independence with mobility by performin. Supine to sit with Stand-by Assistance - met  2. Sit to stand transfer with Supervision with AD -not met  3. Gait  x 150 feet with Supervision using Rolling Walker. -not met  4. Ascend/descend 5 stair with right Handrails Contact Guard Assistance  -not met                       Time Tracking:     PT Received On: 19  PT Start Time: 1031     PT Stop Time: 1057  PT Total Time (min): 26 min     Billable Minutes: Gait Training 12 and Therapeutic Activity 14    Treatment Type: Treatment  PT/PTA: PTA     PTA Visit Number: 3     Bernabe Branch  PTA  06/07/2019

## 2019-06-07 NOTE — NURSING
Pt C/O ABD cramping post dialysis. Refused to take acetaminophen, per Pt, they gave at dialysis does not work. Requesting pain meds and nausea med. Paged team, waiting return call.

## 2019-06-07 NOTE — PT/OT/SLP PROGRESS
Occupational Therapy   Treatment    Name: Sisi Medel  MRN: [id number]  Admitting Diagnosis:  Gram-negative bacteremia       Recommendations:     Discharge Recommendations: nursing facility, skilled  Discharge Equipment Recommendations:  none  Barriers to discharge:  None    Assessment:     Sisi Medel is a 58 y.o. female with a medical diagnosis of Gram-negative bacteremia.  She presents supine and prepared to participate.  Encouraged pt to ensure placement location is chosen so needs are met. . Performance deficits affecting function are impaired functional mobilty, impaired self care skills, impaired endurance, gait instability.     Rehab Prognosis:  Good; patient would benefit from acute skilled OT services to address these deficits and reach maximum level of function.       Plan:     Patient to be seen 3 x/week to address the above listed problems via self-care/home management, therapeutic activities, therapeutic exercises  · Plan of Care Expires: 07/01/19  · Plan of Care Reviewed with: patient, son    Subjective     Pain/Comfort:  ·      Objective:     Communicated with: RN prior to session.  Patient found supine with oxygen, telemetry upon OT entry to room.    General Precautions: Standard, fall   Orthopedic Precautions:N/A   Braces: N/A     Occupational Performance:     Bed Mobility:    · Patient completed Rolling/Turning to Right with contact guard assistance  · Patient completed Scooting/Bridging with contact guard assistance  · Patient completed Supine to Sit with minimum assistance     Functional Mobility/Transfers:  · Patient completed Sit <> Stand Transfer with moderate assistance  with  rolling walker   · Patient completed Bed <> Chair Transfer using Step Transfer technique with moderate assistance with rolling walker      [facility] 6 Click ADL: 19    Treatment & Education:  Pt educated on safety, role of OT, importance of increased participation in self care for gains, importance of DC  particpation, expectations for participation, expectations for gains, POC, energy conservation, caregiver strain. White board updated.       Patient left up in chair with all lines intactEducation:      GOALS:   Multidisciplinary Problems     Occupational Therapy Goals        Problem: Occupational Therapy Goal    Goal Priority Disciplines Outcome Interventions   Occupational Therapy Goal     OT, PT/OT Ongoing (interventions implemented as appropriate)    Description:  Goals to be met by: 6/10/2019    Patient will increase functional independence with ADLs by performing:    UE Dressing with [place].  LE Dressing with [place].  Grooming while standing with [place].  Toileting from toilet with [place] for hygiene and clothing management.                       Time Tracking:     OT Date of Treatment: 06/07/19  OT Start Time: 0930  OT Stop Time: 0955  OT Total Time (min): 25 min    Billable Minutes:Therapeutic Activity 25    Taty Barahona OT  6/7/2019

## 2019-06-08 PROBLEM — Z99.2 END STAGE RENAL FAILURE ON DIALYSIS: Status: ACTIVE | Noted: 2019-01-01

## 2019-06-08 NOTE — PROGRESS NOTES
Dialysis tx completed. 4 hours. 1.5 liters removed. Needles pulled from left arm AVG. Hemostasis achieved. Gauze and tape to sites. Tolerated tx well.

## 2019-06-08 NOTE — PROGRESS NOTES
Patient arrived on unit via stretcher. Unable to obtain patient weight. Dialysis initiated via left arm AVG with 15g needles x 2 without difficulty. Orders and machine settings verified. Tx started. VSS. 350 BFR established.    Maintenance dialysis. T-T-S

## 2019-06-08 NOTE — PROGRESS NOTES
[facility] Hospital Medicine  Progress Note    Patient Name: Sisi Medel  MRN: [id number]  Patient Class: IP- Inpatient   Admission Date: 5/30/2019  Length of Stay: 9 days  Attending Physician: Jeanne Ortega MD  Primary Care Provider: Carlos A Caputo MD    Hospital Medicine Team: [facility] HOSP MED 5 VINAY Suh MD    Subjective:     Principal Problem:Gram-negative bacteremia    HPI:  Ms. Medel is a 58 year old lady with a past medical history of pulmonary sarcoidosis on 3-5L home oxygen, Class 3 HFrEF (3/2019 EF 35%), perm Afib/flutter s/p 6/2017 AVN ablation & PPM, ESRD (TTS), Type 2 DM (3/2019 A1c 6.2%), pulmonary HTN, YOANDY, and orthostatic hypotension who presented to [facility] ED with complaints of shortness of breath. The patient went to dialysis today and apparently could not get down to her dry weight, per neprhology should be 111kg but patient only got to 112. Per the patient her BP's were fine and she did not have any trouble with the dialysis. Upon returning to the car the patient had worsening shortness of breath and had to increase O2 to 5L. Per the patient and her family these symptoms have been persistent for several weeks. She was discharged from [facility] yesterday (5/29/19) following a 2 day admission for similar complaints. At that time the patient needed dialysis for volume overload and new dry weight was established.    Of note patient has had frequent hospitalizations in the past. These admissions are usually for volume overload, CHF, and shortness of breath. She follows with pulmonology as an outpatient and is on chronic steroids. Per last outpatient note, continued on prednisone 20mg daily for sarcoid as she has had difficulty and increased hospitalizations on lower doses.    In the ED the patient was on BiPAP, chest Xray showing significant pulmonary edema. Nephology was consulted and started dialysis in room. On evaluation patient was uncomfortable on bipap but no  increased work of breathing noted. Patient endorsing neck pain from the bipap and leg pain. She denied recent fevers or chills, but did endorse a chronic cough that had been worsening recently.    Hospital Course:  Ms. Medel was initially admitted to hospital medicine on 5/30 for increased work of breathing and pulmonary edema thought to be secondary to insufficient removal of fluid in outpatient dialysis. She was dialyzed in the ED, however her work of breathing significantly increased and she was admitted to the ICU. Her blood cultures drawn on admission grew Klebsiella oxytoca and Streptococcus salivarius, and she was started on IV Vancomycin and Zosyn by the ICU team. Vancomycin was discontinued on 6/2.  Her respiratory status improved after admission to the ICU and further dialysis, and she was stepped down to hospital medicine on 6/2. A CT Chest/abd/pelvis was ordered to evaluate for source of bacteremia but was unremarkable. Infectious disease was consulted on 6/2 and recommended two week course of ceftriaxone. She had clinically improved and was discharged home with home health and home IV antibiotics on 6/3.     Interval History: No acute event overnight. Patient rested well overnight. She was able to working with PT /OT yesterday. Patient at scheduled dialysis this morning and able to tolerate the session. Patient usually becomes weak and in pain during dialysis. She denied fever chill, cough, SOB, urinary difficulty, CP, and HA.     Review of Systems   Constitutional: Positive for fatigue. Negative for chills and fever.   HENT: Negative for rhinorrhea, sneezing and sore throat.    Eyes: Negative for visual disturbance.   Respiratory: Positive for cough (nonproductive). Negative for chest tightness, shortness of breath and wheezing.    Cardiovascular: Negative for chest pain, palpitations and leg swelling.   Gastrointestinal: Negative for abdominal distention, abdominal pain, constipation, diarrhea,  nausea and vomiting.   Genitourinary: Negative for dysuria, flank pain and hematuria.   Musculoskeletal: Positive for gait problem. Negative for arthralgias, back pain, myalgias, neck pain and neck stiffness.   Skin: Negative for color change and rash.   Neurological: Positive for weakness. Negative for dizziness, light-headedness and headaches.   Psychiatric/Behavioral: Negative for agitation, behavioral problems, confusion and decreased concentration.     Objective:     Vital Signs (Most Recent):  Temp: 97.8 °F (36.6 °C) (06/08/19 0517)  Pulse: 70 (06/08/19 0517)  Resp: 18 (06/08/19 0517)  BP: 119/78 (06/08/19 0517)  SpO2: 96 % (06/08/19 0517) Vital Signs (24h Range):  Temp:  [97.2 °F (36.2 °C)-98.2 °F (36.8 °C)] 97.8 °F (36.6 °C)  Pulse:  [69-72] 70  Resp:  [18-19] 18  SpO2:  [92 %-99 %] 96 %  BP: (109-121)/(73-83) 119/78     Weight: 108.2 kg (238 lb 8.6 oz)  Body mass index is 36.27 kg/m².    Intake/Output Summary (Last 24 hours) at 6/8/2019 0741  Last data filed at 6/8/2019 0652  Gross per 24 hour   Intake 120 ml   Output 0 ml   Net 120 ml      Physical Exam   Constitutional: She is oriented to person, place, and time. She appears well-developed and well-nourished. No distress.   HENT:   Head: Normocephalic and atraumatic.   Mouth/Throat: Oropharynx is clear and moist.   Eyes: Pupils are equal, round, and reactive to light. EOM are normal.   Neck: Normal range of motion. Neck supple.   Cardiovascular: Normal rate, regular rhythm, normal heart sounds and intact distal pulses.   No murmur heard.  Pulmonary/Chest: Effort normal and breath sounds normal. No respiratory distress. She has no rales.   Abdominal: Soft. Bowel sounds are normal. She exhibits no distension. There is no tenderness. There is no guarding.   Musculoskeletal: Normal range of motion. She exhibits no edema, tenderness or deformity.   Neurological: She is alert and oriented to person, place, and time. No cranial nerve deficit.   Skin: Skin is  warm and dry. No rash noted. She is not diaphoretic. No erythema. No pallor.   Ulcers on left and right foot.   Psychiatric: She has a normal mood and affect. Her behavior is normal.   Nursing note and vitals reviewed.      Significant Labs:   CBC:   Recent Labs   Lab 06/07/19  0410 06/08/19  0542   WBC 17.39* 12.05   HGB 15.3 12.8   HCT 49.9* 40.5    304     CMP:   Recent Labs   Lab 06/07/19  0410 06/08/19  0542   * 125*   K 4.8 4.4   CL 91* 87*   CO2 19* 17*   * 127*   BUN 38* 55*   CREATININE 6.4* 7.7*   CALCIUM 8.7 8.1*   PROT 8.5* 7.4   ALBUMIN 3.2* 2.9*   BILITOT 0.5 0.4   ALKPHOS 120 104   AST 45* 30   ALT 88* 67*   ANIONGAP 20* 21*   EGFRNONAA 6.6* 5.3*       Significant Imaging: I have reviewed all pertinent imaging results/findings within the past 24 hours.    Assessment/Plan:      * Gram-negative bacteremia  Blood cultures: Klebsiella oxytoca and Streptococcus salivarius. Repeat BCx NGTD    CT Chest/Abd/Pelvis: Symmetric central lung hazy and streaky opacities are consistent with sarcoidosis similar with 03/07/2016.  There is probably superimposed subsegmental atelectasis at the bases.  No current adenopathy. No intra-abdominal inflammatory process    - IV ceftriaxone x2 weeks per ID - end of therapy 6/14/2019   - midline catheter in place  - no source identified, Gastrointestinal translocation possibility.         Discharge planning issues  PT/OT consulted    - Nursing home placement referral out and family looking at options.   - IV abx until 6/14 will require up on discharge. Patient with midline       Other streptococcal sepsis        Transaminitis  Suspect secondary to sepsis, has been downtrending. Biliary duct appeared normal on CT abd       Wound of right leg  - HH wound care   - Wound care consulted       Paroxysmal A-fib  Home med: warfarin   INR: 2.8 on 6/3    - resume home dosing of warfarin       ESRD on hemodialysis  Nephrology consulted    - HD per nephro Tu[place] and Sat  schedule  - daily renal labs          Acute on chronic combined systolic and diastolic heart failure  Echo 3/11/19: EF 35% with diastolic dysfunction    Home meds: aspirin, atorvastatin 80mg    - not on GDMT on admission  - BP too low chronically for ACE, BB, etc       Diabetes mellitus, type 2  Home med: tradjenta  A1c: 6.5    - resume tradjenta on discharge    - Stable while in hospital       Secondary adrenal insufficiency  Home meds: prednisone 20mg daily for 3 days then restart home dose. Conintue fludrocortisone, midodrine    - Prednisone 10mg starting 6/7/19   - de-escalate to home steroid dose. No need for taper with only 4 days of high dose steroids  - continue fludrocortisone, midodrine      Epilepsy  Home med: keppra 500mg bid    - continue      Pulmonary sarcoidosis    On 4L O2 chronically    - will need outpatient follow up with pulmonology  - Continue steroid 10mg       VTE Risk Mitigation (From admission, onward)        Ordered     warfarin tablet 7.5 mg  Daily      06/06/19 1055     IP VTE HIGH RISK PATIENT  Once      05/30/19 1803              VINAY Suh MD  Department of Hospital Medicine   [facility]

## 2019-06-08 NOTE — PLAN OF CARE
Problem: Adult Inpatient Plan of Care  Goal: Plan of Care Review  Outcome: Ongoing (interventions implemented as appropriate)     06/08/19 3918   Plan of Care Review   Plan of Care Reviewed With patient     Pt remain free from fall and injuries. Tolerated meds well. On tele sitter, attempted to get OOB few times, redirected. Bentyl for stomach cramping. Bed locked and lowest position. Call light to easy reach. Will monitor.

## 2019-06-08 NOTE — SUBJECTIVE & OBJECTIVE
Interval History: No acute event overnight. Patient rested well overnight. She was able to working with PT /OT yesterday. Patient at scheduled dialysis this morning and able to tolerate the session. Patient usually becomes weak and in pain during dialysis. She denied fever chill, cough, SOB, urinary difficulty, CP, and HA.     Review of Systems   Constitutional: Positive for fatigue. Negative for chills and fever.   HENT: Negative for rhinorrhea, sneezing and sore throat.    Eyes: Negative for visual disturbance.   Respiratory: Positive for cough (nonproductive). Negative for chest tightness, shortness of breath and wheezing.    Cardiovascular: Negative for chest pain, palpitations and leg swelling.   Gastrointestinal: Negative for abdominal distention, abdominal pain, constipation, diarrhea, nausea and vomiting.   Genitourinary: Negative for dysuria, flank pain and hematuria.   Musculoskeletal: Positive for gait problem. Negative for arthralgias, back pain, myalgias, neck pain and neck stiffness.   Skin: Negative for color change and rash.   Neurological: Positive for weakness. Negative for dizziness, light-headedness and headaches.   Psychiatric/Behavioral: Negative for agitation, behavioral problems, confusion and decreased concentration.     Objective:     Vital Signs (Most Recent):  Temp: 97.8 °F (36.6 °C) (06/08/19 0517)  Pulse: 70 (06/08/19 0517)  Resp: 18 (06/08/19 0517)  BP: 119/78 (06/08/19 0517)  SpO2: 96 % (06/08/19 0517) Vital Signs (24h Range):  Temp:  [97.2 °F (36.2 °C)-98.2 °F (36.8 °C)] 97.8 °F (36.6 °C)  Pulse:  [69-72] 70  Resp:  [18-19] 18  SpO2:  [92 %-99 %] 96 %  BP: (109-121)/(73-83) 119/78     Weight: 108.2 kg (238 lb 8.6 oz)  Body mass index is 36.27 kg/m².    Intake/Output Summary (Last 24 hours) at 6/8/2019 0726  Last data filed at 6/8/2019 0652  Gross per 24 hour   Intake 120 ml   Output 0 ml   Net 120 ml      Physical Exam   Constitutional: She is oriented to person, place, and time. She  appears well-developed and well-nourished. No distress.   HENT:   Head: Normocephalic and atraumatic.   Mouth/Throat: Oropharynx is clear and moist.   Eyes: Pupils are equal, round, and reactive to light. EOM are normal.   Neck: Normal range of motion. Neck supple.   Cardiovascular: Normal rate, regular rhythm, normal heart sounds and intact distal pulses.   No murmur heard.  Pulmonary/Chest: Effort normal and breath sounds normal. No respiratory distress. She has no rales.   Abdominal: Soft. Bowel sounds are normal. She exhibits no distension. There is no tenderness. There is no guarding.   Musculoskeletal: Normal range of motion. She exhibits no edema, tenderness or deformity.   Neurological: She is alert and oriented to person, place, and time. No cranial nerve deficit.   Skin: Skin is warm and dry. No rash noted. She is not diaphoretic. No erythema. No pallor.   Ulcers on left and right foot.   Psychiatric: She has a normal mood and affect. Her behavior is normal.   Nursing note and vitals reviewed.      Significant Labs:   CBC:   Recent Labs   Lab 06/07/19  0410 06/08/19  0542   WBC 17.39* 12.05   HGB 15.3 12.8   HCT 49.9* 40.5    304     CMP:   Recent Labs   Lab 06/07/19  0410 06/08/19  0542   * 125*   K 4.8 4.4   CL 91* 87*   CO2 19* 17*   * 127*   BUN 38* 55*   CREATININE 6.4* 7.7*   CALCIUM 8.7 8.1*   PROT 8.5* 7.4   ALBUMIN 3.2* 2.9*   BILITOT 0.5 0.4   ALKPHOS 120 104   AST 45* 30   ALT 88* 67*   ANIONGAP 20* 21*   EGFRNONAA 6.6* 5.3*       Significant Imaging: I have reviewed all pertinent imaging results/findings within the past 24 hours.

## 2019-06-08 NOTE — NURSING
Spoke with physician and made Dr Lnagley aware of blood sugars and that patient took glucose tabs and ate 1/2 slice of pizza, she was also asymptomatic. No new orders.

## 2019-06-08 NOTE — ASSESSMENT & PLAN NOTE
PT/OT consulted    - Nursing home placement referral out and family looking at options.   - IV abx until 6/14 will require up on discharge. Patient with midline

## 2019-06-08 NOTE — PROGRESS NOTES
[facility]     Patient currently on hemodialysis for removal of uremic toxins and volume.     Patient seen and evaluated on hemodialysis, tolerating treatment, see HD flowsheet for vitals and assessments.      Ultrafiltration goal is 2L     Labs have been reviewed and the dialysate bath has been adjusted.     Assessment/Plan:  Seen on dialysis this morning, tolerating well, she reports no complaints except "feeling tired".  4 hour treatment today.  Increase Renvela to 1600 mg PO TID with meals  Renal diet    AYAN Joseph, FNP-BC  Nephrology  Pager:  [phone]           "

## 2019-06-08 NOTE — NURSING
Patient asymptomatic, noted blood sugar 47, rechecked was 56. Family here and brought her a supreme pizza and she was eating some. Did not eat breakfast today in dialysis. Returned from dialysis at this time. Will continueto assess . Will medicate as needed.

## 2019-06-08 NOTE — NURSING
Patient returned from dialysis at this time. Alert and in no distress. o2 at 4 liters. Decreased to 3 liters. Tolerating well. Vital signs stable. Positioned for comfort .

## 2019-06-09 NOTE — PLAN OF CARE
Problem: Adult Inpatient Plan of Care  Goal: Plan of Care Review  Outcome: Ongoing (interventions implemented as appropriate)     06/09/19 0540   Plan of Care Review   Plan of Care Reviewed With patient     Pt remain free from fall and injuries. No attempt made to get OOB. Denies ABD pain. Tolerated meds well. VSS. Bed locked and lowest position. Tele sitter on. Placed call light to easy reach. Will monitor.

## 2019-06-09 NOTE — NURSING
Patient refused to eat supper refused alternative as well. Explained pro and con and need to eat due to diabetic. Verbalized understanding. Still refused. Did drink supplement .

## 2019-06-09 NOTE — SUBJECTIVE & OBJECTIVE
Interval History: Yesterday patient had an episode of lower blood glucose without any symptoms. Patient didn't feel like eating dinner because she did not have any appetite. She worked with PT/OT yesterday and tolerated HD session.  This mornning patient is feeling well and in a good mood. Patient asking for breakfast. Her son picked out a SNF where they want her to get placed. Denied fever chill, n/v, SOB chest pain, abdominal pain, urinary difficulties and HA.    Review of Systems   Constitutional: Negative for chills, fatigue and fever.   HENT: Negative for rhinorrhea, sneezing and sore throat.    Eyes: Negative for visual disturbance.   Respiratory: Positive for cough (nonproductive improving). Negative for chest tightness, shortness of breath and wheezing.    Cardiovascular: Negative for chest pain, palpitations and leg swelling.   Gastrointestinal: Negative for abdominal distention, abdominal pain, constipation, diarrhea, nausea and vomiting.   Genitourinary: Negative for dysuria, flank pain and hematuria.   Musculoskeletal: Positive for gait problem. Negative for arthralgias, back pain, myalgias, neck pain and neck stiffness.   Skin: Negative for color change and rash.   Neurological: Positive for weakness. Negative for dizziness, light-headedness and headaches.   Psychiatric/Behavioral: Negative for agitation, behavioral problems, confusion and decreased concentration.     Objective:     Vital Signs (Most Recent):  Temp: 97.5 °F (36.4 °C) (06/09/19 0405)  Pulse: 70 (06/09/19 0405)  Resp: 17 (06/09/19 0405)  BP: (!) 120/96 (06/09/19 0405)  SpO2: (!) 94 % (06/08/19 2357) Vital Signs (24h Range):  Temp:  [96.9 °F (36.1 °C)-97.8 °F (36.6 °C)] 97.5 °F (36.4 °C)  Pulse:  [67-75] 70  Resp:  [17-18] 17  SpO2:  [94 %-96 %] 94 %  BP: (101-148)/(52-98) 120/96     Weight: 108.2 kg (238 lb 8.6 oz)  Body mass index is 36.27 kg/m².    Intake/Output Summary (Last 24 hours) at 6/9/2019 7161  Last data filed at 6/9/2019  0636  Gross per 24 hour   Intake 1077 ml   Output 2100 ml   Net -1023 ml      Physical Exam   Constitutional: She is oriented to person, place, and time. She appears well-developed and well-nourished. No distress.   HENT:   Head: Normocephalic and atraumatic.   Mouth/Throat: Oropharynx is clear and moist.   Eyes: Pupils are equal, round, and reactive to light. EOM are normal.   Neck: Normal range of motion. Neck supple.   Cardiovascular: Normal rate, regular rhythm, normal heart sounds and intact distal pulses.   No murmur heard.  Pulmonary/Chest: Effort normal and breath sounds normal. No respiratory distress. She has no wheezes. She has no rales.   Abdominal: Soft. Bowel sounds are normal. She exhibits no distension. There is no tenderness. There is no guarding.   Musculoskeletal: Normal range of motion. She exhibits no edema, tenderness or deformity.   Neurological: She is alert and oriented to person, place, and time. No cranial nerve deficit.   Skin: Skin is warm and dry. No rash noted. She is not diaphoretic. No erythema. No pallor.   Ulcers on left and right foot.   Psychiatric: She has a normal mood and affect. Her behavior is normal.   Nursing note and vitals reviewed.      Significant Labs:   CBC:   Recent Labs   Lab 06/08/19  0542 06/09/19  0521   WBC 12.05 13.63*   HGB 12.8 12.8   HCT 40.5 41.7    331     CMP:   Recent Labs   Lab 06/08/19  0542 06/09/19  0521   * 127*   K 4.4 4.0   CL 87* 88*   CO2 17* 23   * 132*   BUN 55* 29*   CREATININE 7.7* 5.1*   CALCIUM 8.1* 8.5*   PROT 7.4 7.6   ALBUMIN 2.9* 3.1*   BILITOT 0.4 0.4   ALKPHOS 104 129   AST 30 32   ALT 67* 58*   ANIONGAP 21* 16   EGFRNONAA 5.3* 8.7*       Significant Imaging: I have reviewed all pertinent imaging results/findings within the past 24 hours.

## 2019-06-09 NOTE — PROGRESS NOTES
[facility] Hospital Medicine  Progress Note    Patient Name: Sisi Medel  MRN: [id number]  Patient Class: IP- Inpatient   Admission Date: 5/30/2019  Length of Stay: 10 days  Attending Physician: Jeanne Ortega MD  Primary Care Provider: Carlos A Caputo MD    Hospital Medicine Team: [facility] HOSP MED 5 VINAY Suh MD    Subjective:     Principal Problem:Gram-negative bacteremia    HPI:  Ms. Medel is a 58 year old lady with a past medical history of pulmonary sarcoidosis on 3-5L home oxygen, Class 3 HFrEF (3/2019 EF 35%), perm Afib/flutter s/p 6/2017 AVN ablation & PPM, ESRD (TTS), Type 2 DM (3/2019 A1c 6.2%), pulmonary HTN, YOANDY, and orthostatic hypotension who presented to [facility] ED with complaints of shortness of breath. The patient went to dialysis today and apparently could not get down to her dry weight, per neprhology should be 111kg but patient only got to 112. Per the patient her BP's were fine and she did not have any trouble with the dialysis. Upon returning to the car the patient had worsening shortness of breath and had to increase O2 to 5L. Per the patient and her family these symptoms have been persistent for several weeks. She was discharged from [facility] yesterday (5/29/19) following a 2 day admission for similar complaints. At that time the patient needed dialysis for volume overload and new dry weight was established.    Of note patient has had frequent hospitalizations in the past. These admissions are usually for volume overload, CHF, and shortness of breath. She follows with pulmonology as an outpatient and is on chronic steroids. Per last outpatient note, continued on prednisone 20mg daily for sarcoid as she has had difficulty and increased hospitalizations on lower doses.    In the ED the patient was on BiPAP, chest Xray showing significant pulmonary edema. Nephology was consulted and started dialysis in room. On evaluation patient was uncomfortable on bipap but no  increased work of breathing noted. Patient endorsing neck pain from the bipap and leg pain. She denied recent fevers or chills, but did endorse a chronic cough that had been worsening recently.    Hospital Course:  Ms. Medel was initially admitted to hospital medicine on 5/30 for increased work of breathing and pulmonary edema thought to be secondary to insufficient removal of fluid in outpatient dialysis. She was dialyzed in the ED, however her work of breathing significantly increased and she was admitted to the ICU. Her blood cultures drawn on admission grew Klebsiella oxytoca and Streptococcus salivarius, and she was started on IV Vancomycin and Zosyn by the ICU team. Vancomycin was discontinued on 6/2.  Her respiratory status improved after admission to the ICU and further dialysis, and she was stepped down to hospital medicine on 6/2. A CT Chest/abd/pelvis was ordered to evaluate for source of bacteremia but was unremarkable. Infectious disease was consulted on 6/2 and recommended two week course of ceftriaxone. She had clinically improved and was discharged home with home health and home IV antibiotics on 6/3.     Interval History: Yesterday patient had an episode of lower blood glucose without any symptoms. Patient didn't feel like eating dinner because she did not have any appetite. She worked with PT/OT yesterday and tolerated HD session.  This mornning patient is feeling well and in a good mood. Patient asking for breakfast. Her son picked out a SNF where they want her to get placed. Denied fever chill, n/v, SOB chest pain, abdominal pain, urinary difficulties and HA.    Review of Systems   Constitutional: Negative for chills, fatigue and fever.   HENT: Negative for rhinorrhea, sneezing and sore throat.    Eyes: Negative for visual disturbance.   Respiratory: Positive for cough (nonproductive improving). Negative for chest tightness, shortness of breath and wheezing.    Cardiovascular: Negative for  chest pain, palpitations and leg swelling.   Gastrointestinal: Negative for abdominal distention, abdominal pain, constipation, diarrhea, nausea and vomiting.   Genitourinary: Negative for dysuria, flank pain and hematuria.   Musculoskeletal: Positive for gait problem. Negative for arthralgias, back pain, myalgias, neck pain and neck stiffness.   Skin: Negative for color change and rash.   Neurological: Positive for weakness. Negative for dizziness, light-headedness and headaches.   Psychiatric/Behavioral: Negative for agitation, behavioral problems, confusion and decreased concentration.     Objective:     Vital Signs (Most Recent):  Temp: 97.5 °F (36.4 °C) (06/09/19 0405)  Pulse: 70 (06/09/19 0405)  Resp: 17 (06/09/19 0405)  BP: (!) 120/96 (06/09/19 0405)  SpO2: (!) 94 % (06/08/19 2357) Vital Signs (24h Range):  Temp:  [96.9 °F (36.1 °C)-97.8 °F (36.6 °C)] 97.5 °F (36.4 °C)  Pulse:  [67-75] 70  Resp:  [17-18] 17  SpO2:  [94 %-96 %] 94 %  BP: (101-148)/(52-98) 120/96     Weight: 108.2 kg (238 lb 8.6 oz)  Body mass index is 36.27 kg/m².    Intake/Output Summary (Last 24 hours) at 6/9/2019 0729  Last data filed at 6/9/2019 0636  Gross per 24 hour   Intake 1077 ml   Output 2100 ml   Net -1023 ml      Physical Exam   Constitutional: She is oriented to person, place, and time. She appears well-developed and well-nourished. No distress.   HENT:   Head: Normocephalic and atraumatic.   Mouth/Throat: Oropharynx is clear and moist.   Eyes: Pupils are equal, round, and reactive to light. EOM are normal.   Neck: Normal range of motion. Neck supple.   Cardiovascular: Normal rate, regular rhythm, normal heart sounds and intact distal pulses.   No murmur heard.  Pulmonary/Chest: Effort normal and breath sounds normal. No respiratory distress. She has no wheezes. She has no rales.   Abdominal: Soft. Bowel sounds are normal. She exhibits no distension. There is no tenderness. There is no guarding.   Musculoskeletal: Normal range of  motion. She exhibits no edema, tenderness or deformity.   Neurological: She is alert and oriented to person, place, and time. No cranial nerve deficit.   Skin: Skin is warm and dry. No rash noted. She is not diaphoretic. No erythema. No pallor.   Ulcers on left and right foot.   Psychiatric: She has a normal mood and affect. Her behavior is normal.   Nursing note and vitals reviewed.      Significant Labs:   CBC:   Recent Labs   Lab 06/08/19  0542 06/09/19  0521   WBC 12.05 13.63*   HGB 12.8 12.8   HCT 40.5 41.7    331     CMP:   Recent Labs   Lab 06/08/19  0542 06/09/19  0521   * 127*   K 4.4 4.0   CL 87* 88*   CO2 17* 23   * 132*   BUN 55* 29*   CREATININE 7.7* 5.1*   CALCIUM 8.1* 8.5*   PROT 7.4 7.6   ALBUMIN 2.9* 3.1*   BILITOT 0.4 0.4   ALKPHOS 104 129   AST 30 32   ALT 67* 58*   ANIONGAP 21* 16   EGFRNONAA 5.3* 8.7*       Significant Imaging: I have reviewed all pertinent imaging results/findings within the past 24 hours.    Assessment/Plan:      * Gram-negative bacteremia  Blood cultures: Klebsiella oxytoca and Streptococcus salivarius. Repeat BCx NGTD    CT Chest/Abd/Pelvis: Symmetric central lung hazy and streaky opacities are consistent with sarcoidosis similar with 03/07/2016.  There is probably superimposed subsegmental atelectasis at the bases.  No current adenopathy. No intra-abdominal inflammatory process    - IV ceftriaxone x2 weeks per ID - end of therapy 6/14/2019   - midline catheter in place  - no source identified, Gastrointestinal translocation possibility.         Discharge planning issues  PT/OT consulted    - Nursing home placement referral out and family looking at options.   - IV abx until 6/14 will require up on discharge. Patient with midline       Other streptococcal sepsis        Transaminitis  Suspect secondary to sepsis, has been downtrending. Biliary duct appeared normal on CT abd       Wound of right leg  -  wound care   - Wound care consulted       Paroxysmal  A-fib  Home med: warfarin   INR: 2.8 on 6/3    - resume home dosing of warfarin       ESRD on hemodialysis  Nephrology consulted    - HD per nephro TuThur and Sat schedule  - daily renal labs          Acute on chronic combined systolic and diastolic heart failure  Echo 3/11/19: EF 35% with diastolic dysfunction    Home meds: aspirin, atorvastatin 80mg    - not on GDMT on admission  - BP too low chronically for ACE, BB, etc       Diabetes mellitus, type 2  Home med: tradjenta  A1c: 6.5    - resume tradjenta on discharge    - Stable while in hospital      Secondary adrenal insufficiency  Home meds: prednisone 20mg daily for 3 days then restart home dose. Conintue fludrocortisone, midodrine    - Prednisone 10mg starting 6/7/19   - de-escalate to home steroid dose. No need for taper with only 4 days of high dose steroids  - continue fludrocortisone, midodrine      Epilepsy  Home med: keppra 500mg bid    - continue      Pulmonary sarcoidosis    On 4L O2 chronically    - will need outpatient follow up with pulmonology  - Continue steroid 10mg       VTE Risk Mitigation (From admission, onward)        Ordered     warfarin tablet 7.5 mg  Daily      06/06/19 1055     IP VTE HIGH RISK PATIENT  Once      05/30/19 1803              VINAY Suh MD  Department of Hospital Medicine   [facility]

## 2019-06-09 NOTE — PLAN OF CARE
Problem: Adult Inpatient Plan of Care  Goal: Plan of Care Review  Outcome: Ongoing (interventions implemented as appropriate)  Patient alert and oriented , able to verbalize needs. Did go this am to dialysis , removed 1.5 liters fluid per report. Did not eat breakfast in dialysis. Did have low blood sugar upon return to unit and glucose tabs given. Tolerated well. Did eat pizza family brought. Did not want supper or alternate, encouraged to drink supplement. Tolerated  Well and report to on coming nurse to encourage her to eat snack later. Vital signs stable. Blood sugars monitored. Did sit in chair with family earlier. No fall or occurrence noted. Assessment on going.

## 2019-06-10 PROBLEM — J96.21 ACUTE AND CHRONIC RESPIRATORY FAILURE WITH HYPOXIA: Status: ACTIVE | Noted: 2019-01-01

## 2019-06-10 NOTE — PLAN OF CARE
CM spoke with Dr. Ortega who reports patient's son is agreeable to transfer today if bed still available at [facility]. Spoke with Maxine at [facility] who can take patient when orders are completed. CM called son to discuss patient discharging once orders are completed and w/c van arranged. Conversation occurred between CM Director Roxanne Diez explaining to son that he could be held financially responsible for patient's stay tonight if discharge were held, he verbalized understanding. IM 5 to place discharge order.

## 2019-06-10 NOTE — PLAN OF CARE
SANJU spoke with ZACH. Patient accepted to [facility] for discharge today. Call placed to son Kenan who insists that he speak with Liaison from [facility] regarding co-pay and Length of stay before agreeing to patient's discharge. Maxine from [facility] to call son to explain process. Will continue to follow-up.

## 2019-06-10 NOTE — PT/OT/SLP PROGRESS
"Physical Therapy Treatment    Patient Name:  Sisi Medel   MRN:  [id number]    Recommendations:     Discharge Recommendations:  nursing facility, skilled   Discharge Equipment Recommendations: none   Barriers to discharge: None    Assessment:     Sisi Medel is a 58 y.o. female admitted with a medical diagnosis of Gram-negative bacteremia.  She presents with the following impairments/functional limitations:  weakness, impaired endurance, impaired self care skills, impaired functional mobilty, gait instability, impaired balance, decreased lower extremity function, decreased safety awareness, impaired cardiopulmonary response to activity. Pt tolerated session well with focus on bed mobility, transfers, gait training and stair training. Pt progressing well with all mobility this day with pt requiring Min A for standing and pt attempts stairs this date. Pt able to perform 2 steps with Mod A for balance with pt becoming increasingly SOB upon elevation to second step. Pt safely descends with Mod A and is able to return to room to EOB. Pt then assisted to bedside chair and is agreeable to sitting up and calling for assistance from NSG to return to bed. Avasys camera remains in place. Pt will continue to benefit from therapy services to address impairments listed above.     Rehab Prognosis: Good; patient would benefit from acute skilled PT services to address these deficits and reach maximum level of function.    Recent Surgery: * No surgery found *      Plan:     During this hospitalization, patient to be seen 4 x/week to address the identified rehab impairments via gait training, therapeutic activities and progress toward the following goals:    · Plan of Care Expires:  06/29/19    Subjective     Chief Complaint: no c/o  Patient/Family Comments/goals: "Thank you for working so well with me. I really feel much better today."   Pain/Comfort:  · Pain Rating 1: 0/10  · Pain Rating Post-Intervention 1: " 0/10      Objective:     Communicated with NSG prior to session.  Patient found HOB elevated with oxygen, telemetry upon PTA entry to room.     General Precautions: Standard, fall   Orthopedic Precautions:N/A   Braces: N/A     Functional Mobility:  · Bed Mobility:     · Scooting: modified independence  · Supine to Sit: modified independence  · Transfers:     · Sit to Stand:  minimum assistance with rolling walker  · Bed to Chair: contact guard assistance with  rolling walker  using  Stand Pivot  · Gait: Pt ambulates 82 ft with RW and CGA with single posterior LOB requiring Min A to recover. Pt with SOB reported and requiring standing rest x 2 during gait trial.       AM-PAC 6 CLICK MOBILITY  Turning over in bed (including adjusting bedclothes, sheets and blankets)?: 3  Sitting down on and standing up from a chair with arms (e.g., wheelchair, bedside commode, etc.): 3  Moving from lying on back to sitting on the side of the bed?: 3  Moving to and from a bed to a chair (including a wheelchair)?: 3  Need to walk in hospital room?: 3  Climbing 3-5 steps with a railing?: 2  Basic Mobility Total Score: 17       Therapeutic Activities and Exercises:  Pt assisted with functional mobility as noted above.   Pt performs sit <>stand x 5 trials this day with pt requiring cues for full turn to seating surface and reaching for UE support to control descent to sitting.   Pt educated on safety with all mobility, pt progression, and PT POC.     Patient left up in chair with all lines intact, call button in reach and avasys camera present..    GOALS:   Multidisciplinary Problems     Physical Therapy Goals        Problem: Physical Therapy Goal    Goal Priority Disciplines Outcome Goal Variances Interventions   Physical Therapy Goal     PT, PT/OT Ongoing (interventions implemented as appropriate)     Description:  Goals to be met by: 6/15/19    Patient will increase functional independence with mobility by performin. Supine to  sit with Stand-by Assistance - met  2. Sit to stand transfer with Supervision with AD -not met  3. Gait  x 150 feet with Supervision using Rolling Walker. -not met  4. Ascend/descend 5 stair with right Handrails Contact Guard Assistance  -not met                       Time Tracking:     PT Received On: 06/10/19  PT Start Time: 0910     PT Stop Time: 0936  PT Total Time (min): 26 min     Billable Minutes: Gait Training 16 and Therapeutic Activity 10    Treatment Type: Treatment  PT/PTA: PTA     PTA Visit Number: 4     Bernabe Branch PTA  06/10/2019

## 2019-06-10 NOTE — PLAN OF CARE
Problem: Adult Inpatient Plan of Care  Goal: Plan of Care Review  Outcome: Ongoing (interventions implemented as appropriate)     06/10/19 2749   Plan of Care Review   Plan of Care Reviewed With patient     Pt rested well throughout the night. Remain free from fall and injuries, tolerated meds well. VSS. Blood glucose monitored. Denies any pain and discomfort. Tele sitter on . Made no effort to get OOB this shift. Dressing to lower ext changed, tolerated well. Safety maintained. Bed eleni and lowest position, call light to easy reach. Will monitor.

## 2019-06-10 NOTE — DISCHARGE SUMMARY
[facility] Hospital Medicine  Discharge Summary      Patient Name: Sisi Medel  MRN: [id number]  Admission Date: 5/30/2019  Hospital Length of Stay: 11 days  Discharge Date and Time:  06/10/2019 4:05 PM  Attending Physician: Jeanne Ortega MD   Discharging Provider: Gage Suh MD  Primary Care Provider: Carlos A Caputo MD  Hospital Medicine Team: [facility] HOSP MED 5 VINAY Suh MD    HPI:   Ms. Medel is a 58 year old lady with a past medical history of pulmonary sarcoidosis on 3-5L home oxygen, Class 3 HFrEF (3/2019 EF 35%), perm Afib/flutter s/p 6/2017 AVN ablation & PPM, ESRD (TTS), Type 2 DM (3/2019 A1c 6.2%), pulmonary HTN, YOANDY, and orthostatic hypotension who presented to [facility] ED with complaints of shortness of breath. The patient went to dialysis today and apparently could not get down to her dry weight, per neprhology should be 111kg but patient only got to 112. Per the patient her BP's were fine and she did not have any trouble with the dialysis. Upon returning to the car the patient had worsening shortness of breath and had to increase O2 to 5L. Per the patient and her family these symptoms have been persistent for several weeks. She was discharged from [facility] yesterday (5/29/19) following a 2 day admission for similar complaints. At that time the patient needed dialysis for volume overload and new dry weight was established.    Of note patient has had frequent hospitalizations in the past. These admissions are usually for volume overload, CHF, and shortness of breath. She follows with pulmonology as an outpatient and is on chronic steroids. Per last outpatient note, continued on prednisone 20mg daily for sarcoid as she has had difficulty and increased hospitalizations on lower doses.    In the ED the patient was on BiPAP, chest Xray showing significant pulmonary edema. Nephology was consulted and started dialysis in room. On evaluation patient was uncomfortable on  bipap but no increased work of breathing noted. Patient endorsing neck pain from the bipap and leg pain. She denied recent fevers or chills, but did endorse a chronic cough that had been worsening recently.    * No surgery found *      Hospital Course:   Ms. Medel was initially admitted to hospital medicine on 5/30 for increased work of breathing and pulmonary edema thought to be secondary to insufficient removal of fluid in outpatient dialysis. She was dialyzed in the ED, however her work of breathing significantly increased and she was admitted to the ICU. Her blood cultures drawn on admission grew Klebsiella oxytoca and Streptococcus salivarius, and she was started on IV Vancomycin and Zosyn by the ICU team. Vancomycin was discontinued on 6/2.  Her respiratory status improved after admission to the ICU and further dialysis, and she was stepped down to hospital medicine on 6/2. A CT Chest/abd/pelvis was ordered to evaluate for source of bacteremia but was unremarkable. Infectious disease was consulted on 6/2 and recommended two week course of ceftriaxone. She had clinically improved and will go to SNF and continue to get IV antibiotics which ends at 6/12/19     Consults:   Consults (From admission, onward)        Status Ordering Provider     Case Management/  Once     Provider:  (Not yet assigned)    Acknowledged ONESIMO CHAVIRA     Inpatient consult to Critical Care Medicine  Once     Provider:  (Not yet assigned)    Completed LUAN PERSAUD     Inpatient consult to Infectious Diseases  Once     Provider:  (Not yet assigned)    Completed LUAN PERSAUD     Inpatient consult to Midline team  Once     Provider:  (Not yet assigned)    Completed VICKEY MONDRAGON     Inpatient consult to Nephrology  Once     Provider:  (Not yet assigned)    Completed JORGE LUIS CASILLAS     Inpatient consult to SNF  Once     Provider:  (Not yet assigned)    Acknowledged ENRIQUE SALCEDO           No new Assessment & Plan notes have been filed under this hospital service since the last note was generated.  Service: Hospital Medicine    Final Active Diagnoses:    Diagnosis Date Noted POA    PRINCIPAL PROBLEM:  Gram-negative bacteremia [R78.81] 06/01/2019 Yes    Open wound of toe [S91.109A] 06/04/2019 Yes    Leg wound, right [S81.801A] 06/04/2019 Yes    Open wound of right great toe [S91.101A] 06/04/2019 Yes    Other streptococcal sepsis [A40.8] 06/02/2019 Yes    Discharge planning issues [Z02.9] 06/02/2019 Not Applicable    Wound of right leg [S81.801A] 05/30/2019 Yes    Transaminitis [R74.0] 05/30/2019 Yes    Paroxysmal A-fib [I48.0] 05/10/2019 Yes    End stage renal failure on dialysis [N18.6, Z99.2] 04/05/2019 Not Applicable    Acute on chronic combined systolic and diastolic heart failure [I50.43] 03/24/2019 Yes    ESRD on hemodialysis [N18.6, Z99.2]  Not Applicable    Diabetes mellitus, type 2 [E11.9] 12/08/2017 Yes    Secondary adrenal insufficiency [E27.49] 02/04/2017 Yes    Epilepsy [G40.909] 03/19/2013 Yes    Pulmonary sarcoidosis [D86.0] 03/19/2013 Yes      Problems Resolved During this Admission:    Diagnosis Date Noted Date Resolved POA    Acute on chronic respiratory failure with hypoxia and hypercapnia [J96.21, J96.22] 05/30/2019 06/03/2019 Yes    Encephalopathy [G93.40] 05/30/2019 06/01/2019 Yes       Discharged Condition: good    Disposition: Skilled Nursing Facility    Follow Up:  Follow-up Information     Jj Altamirano MD In 2 weeks.    Specialty:  Pulmonary Disease  Contact information:  [address]  [facility] [address] [phone]             Carlos A Caputo MD In 1 week.    Specialty:  Internal Medicine  Contact information:  [address]  [facility] [address] [phone]             [facility] Today.    Specialties:  Pharmacist, DME Provider, IV Infusion  Contact information:  [address] [place] [address] [phone]                  Patient Instructions:   No discharge procedures on file.    Significant Diagnostic Studies: Labs:   CMP   Recent Labs   Lab 06/09/19  0521 06/10/19  0739   * 124*   K 4.0 4.8   CL 88* 85*   CO2 23 19*   * 55*   BUN 29* 42*   CREATININE 5.1* 7.0*   CALCIUM 8.5* 8.6*   PROT 7.6 7.5   ALBUMIN 3.1* 3.1*   BILITOT 0.4 0.4   ALKPHOS 129 115   AST 32 36   ALT 58* 48*   ANIONGAP 16 20*   ESTGFRAFRICA 10.1* 6.9*   EGFRNONAA 8.7* 6.0*    and CBC   Recent Labs   Lab 06/09/19  0521 06/10/19  0739   WBC 13.63* 16.97*   HGB 12.8 12.7   HCT 41.7 41.2    311       Pending Diagnostic Studies:     None         Medications:  Transfer Medications (for Discharge Readmit only):   Current Facility-Administered Medications   Medication Dose Route Frequency Provider Last Rate Last Dose    acetaminophen tablet 650 mg  650 mg Oral Q4H PRN Maicol Altamirano MD   650 mg at 06/06/19 1005    albuterol-ipratropium 2.5 mg-0.5 mg/3 mL nebulizer solution 3 mL  3 mL Nebulization Q4H PRN Maicol Altamirano MD        aspirin EC tablet 81 mg  81 mg Oral Daily Maicol Altamirano MD   81 mg at 06/10/19 0857    cefTRIAXone (ROCEPHIN) 2 g in dextrose 5 % 50 mL IVPB  2 g Intravenous Q24H Priti Lozano DO   2 g at 06/09/19 1737    cinacalcet tablet 30 mg  30 mg Oral Daily with breakfast Maicol Altamirano MD   30 mg at 06/10/19 0852    clopidogrel tablet 75 mg  75 mg Oral Daily Maicol Altamirano MD   75 mg at 06/10/19 0854    dextrose 50% injection 12.5 g  12.5 g Intravenous PRN Maicol Altamirano MD   Stopped at 05/30/19 1830    dextrose 50% injection 25 g  25 g Intravenous PRN Maicol Altamirano MD   25 g at 05/30/19 1830    dicyclomine tablet 20 mg  20 mg Oral Q8H PRN Chel Núñez MD   20 mg at 06/08/19 0012    fludrocortisone tablet 100 mcg  100 mcg Oral BID Ivy Vaughn DO   100 mcg at 06/10/19 0855    glucagon (human recombinant) injection 1 mg  1 mg Intramuscular PRN Maicol Camarena  MD Adarsh        glucose chewable tablet 16 g  16 g Oral PRN Maicol Altamirano MD   16 g at 06/08/19 1334    glucose chewable tablet 24 g  24 g Oral PRN Maicol Altamirano MD        insulin aspart U-100 pen 0-5 Units  0-5 Units Subcutaneous QID (AC + HS) PRN Maiocl Altamirano MD   2 Units at 06/09/19 1741    insulin detemir U-100 pen 2 Units  2 Units Subcutaneous QHS Ivy Vaughn DO   2 Units at 06/09/19 2103    levETIRAcetam tablet 500 mg  500 mg Oral BID Aaron Nichols MD   500 mg at 06/10/19 0855    miconazole NITRATE 2 % top powder   Topical (Top) BID Gage Suh MD        midodrine tablet 10 mg  10 mg Oral TID Maicol Altamirano MD   10 mg at 06/10/19 1557    pantoprazole EC tablet 40 mg  40 mg Oral Daily Maicol Altamirano MD   40 mg at 06/10/19 0856    prednisoLONE acetate 1 % ophthalmic suspension 1 drop  1 drop Left Eye TID Maicol Altamirano MD   1 drop at 06/10/19 1557    predniSONE tablet 10 mg  10 mg Oral Daily Gage Suh MD   10 mg at 06/10/19 0857    promethazine tablet 12.5 mg  12.5 mg Oral Q6H PRN Chel Núñez MD   12.5 mg at 06/09/19 1955    sevelamer carbonate tablet 1,600 mg  1,600 mg Oral TID  Toni Fernando NP   1,600 mg at 06/10/19 1300    sodium chloride 0.9% flush 10 mL  10 mL Intravenous PRN Maicol Altamirano MD        vitamin renal formula (B-complex-vitamin c-folic acid) 1 mg per capsule 1 capsule  1 capsule Oral Daily Maicol Altamirano MD   1 capsule at 06/10/19 0851       Indwelling Lines/Drains at time of discharge:   Lines/Drains/Airways     Drain                 Hemodialysis AV Graft Left upper arm -- days         Hemodialysis AV Graft Left upper arm -- days                Time spent on the discharge of patient: 60 minutes  Patient was seen and examined on the date of discharge and determined to be suitable for discharge.         VINAY Suh MD  Department of Hospital Medicine  [facility]

## 2019-06-10 NOTE — PLAN OF CARE
CM received call from Maxine at [facility] who reports patient's son toured facility and is pleased with the idea of transfer there. However, son wants transfer to take place tomorrow following dialysis. Call placed to IM 5, Dr. Vaughn reports speaking with Dr. Ortega and patient will discharge today. CM called and spoke with son Kenan to make him aware that team still intends to discharge patient today.Son upset as he doesn't want patient transferred late and then having to get up early for dialysis. Explained that transfer could happen quickly and that if patient were to be placed on dialysis list this late she could still have an afternoon discharge tomorrow. Son insists on speaking with Dr. Ortega personally. Call Placed to physician who will call son to discuss matter.

## 2019-06-10 NOTE — SUBJECTIVE & OBJECTIVE
Interval History: Continue to make improvement. She slept well last night and continue to work with incentive spirometer. Patient in cheer mood this morning wanting to work with PT/OT. Patient denied sob, chill fever, nausea vomiting and urinary difficulty.     Review of Systems   Constitutional: Negative for chills, fatigue and fever.   HENT: Negative for rhinorrhea, sneezing and sore throat.    Eyes: Negative for visual disturbance.   Respiratory: Positive for cough (nonproductive improving). Negative for chest tightness, shortness of breath and wheezing.    Cardiovascular: Negative for chest pain, palpitations and leg swelling.   Gastrointestinal: Negative for abdominal distention, abdominal pain, constipation, diarrhea, nausea and vomiting.   Genitourinary: Negative for dysuria, flank pain and hematuria.   Musculoskeletal: Positive for gait problem. Negative for arthralgias, back pain, myalgias, neck pain and neck stiffness.   Skin: Negative for color change and rash.   Neurological: Positive for weakness. Negative for dizziness, light-headedness and headaches.   Psychiatric/Behavioral: Negative for agitation, behavioral problems, confusion and decreased concentration.     Objective:     Vital Signs (Most Recent):  Temp: 97.7 °F (36.5 °C) (06/10/19 0727)  Pulse: 70 (06/10/19 0727)  Resp: 15 (06/10/19 0727)  BP: 102/69 (06/10/19 0727)  SpO2: 100 % (06/10/19 0727) Vital Signs (24h Range):  Temp:  [96 °F (35.6 °C)-98.3 °F (36.8 °C)] 97.7 °F (36.5 °C)  Pulse:  [69-72] 70  Resp:  [14-19] 15  SpO2:  [96 %-100 %] 100 %  BP: (102-118)/(68-81) 102/69     Weight: 108.2 kg (238 lb 8.6 oz)  Body mass index is 36.27 kg/m².    Intake/Output Summary (Last 24 hours) at 6/10/2019 0839  Last data filed at 6/10/2019 0639  Gross per 24 hour   Intake 607 ml   Output 0 ml   Net 607 ml      Physical Exam   Constitutional: She is oriented to person, place, and time. She appears well-developed and well-nourished. No distress.   HENT:    Head: Normocephalic and atraumatic.   Mouth/Throat: Oropharynx is clear and moist.   Eyes: Pupils are equal, round, and reactive to light. EOM are normal.   Neck: Normal range of motion. Neck supple.   Cardiovascular: Normal rate, regular rhythm, normal heart sounds and intact distal pulses.   No murmur heard.  Pulmonary/Chest: Effort normal. No respiratory distress. She has no wheezes. She has rales (left lung field ).   Abdominal: Soft. Bowel sounds are normal. She exhibits no distension. There is no tenderness. There is no guarding.   Musculoskeletal: Normal range of motion. She exhibits no edema, tenderness or deformity.   Neurological: She is alert and oriented to person, place, and time. No cranial nerve deficit.   Skin: Skin is warm and dry. No rash noted. She is not diaphoretic. No erythema. No pallor.   Ulcers on left and right foot.   Psychiatric: She has a normal mood and affect. Her behavior is normal.   Nursing note and vitals reviewed.      Significant Labs:   CBC:   Recent Labs   Lab 06/09/19  0521   WBC 13.63*   HGB 12.8   HCT 41.7        CMP:   Recent Labs   Lab 06/09/19  0521   *   K 4.0   CL 88*   CO2 23   *   BUN 29*   CREATININE 5.1*   CALCIUM 8.5*   PROT 7.6   ALBUMIN 3.1*   BILITOT 0.4   ALKPHOS 129   AST 32   ALT 58*   ANIONGAP 16   EGFRNONAA 8.7*       Significant Imaging: I have reviewed all pertinent imaging results/findings within the past 24 hours.

## 2019-06-10 NOTE — PLAN OF CARE
Problem: Adult Inpatient Plan of Care  Goal: Plan of Care Review  Outcome: Ongoing (interventions implemented as appropriate)  Patient alert and oriented able to make needs known. Did eat much better this shift. Did receive 2 units insulin this evening with supper meal . Dressing to right upper arm midline changed this evening ,aseptic technique, tolerated well. Skin dressings remains intact. Mother visited her most of shift . Turns and positions on own. Able to transfer to bedside commode with sba. Had 2 stools today. Fistula positive thrills and bruits. Patient states she plans to go to rehab. Tolerated iv antibiotic. No falls or occurrences. Report to on coming nurse given on rounds.

## 2019-06-10 NOTE — PLAN OF CARE
[facility]     Department of Hospital Medicine     [address] [place] [address]     [phone] after hours  [phone] fax       NURSING HOME ORDERS    06/10/2019    Admit to Nursing Home:    Skilled Bed                                                 Diagnoses:  Active Hospital Problems    Diagnosis  POA    *Gram-negative bacteremia [R78.81]  Yes    Open wound of toe [S91.109A]  Yes    Leg wound, right [S81.801A]  Yes    Open wound of right great toe [S91.101A]  Yes    Other streptococcal sepsis [A40.8]  Yes    Discharge planning issues [Z02.9]  Not Applicable    Wound of right leg [S81.801A]  Yes    Transaminitis [R74.0]  Yes    Paroxysmal A-fib [I48.0]  Yes    End stage renal failure on dialysis [N18.6, Z99.2]  Not Applicable    Acute on chronic combined systolic and diastolic heart failure [I50.43]  Yes    ESRD on hemodialysis [N18.6, Z99.2]  Not Applicable    Diabetes mellitus, type 2 [E11.9]  Yes    Secondary adrenal insufficiency [E27.49]  Yes    Epilepsy [G40.909]  Yes    Pulmonary sarcoidosis [D86.0]  Yes      Resolved Hospital Problems    Diagnosis Date Resolved POA    Acute on chronic respiratory failure with hypoxia and hypercapnia [J96.21, J96.22] 06/03/2019 Yes    Encephalopathy [G93.40] 06/01/2019 Yes       Patient is homebound due to:  Gram-negative bacteremia    Allergies:  Review of patient's allergies indicates:   Allergen Reactions    Bactrim [sulfamethoxazole-trimethoprim] Other (See Comments)     Causes renal failure    Nsaids (non-steroidal anti-inflammatory drug) Other (See Comments)     Renal failure       Vitals:  Per facility      Every shift (Skilled Nursing patients)    Diet: Diabetic   Acitivities:  Per facility   - Up in a chair each morning as tolerated   - Ambulate with assistance to bathroom   - Scheduled walks once each shift (every 8 hours)   - May ambulate independently   - May use walker, cane, or  self-propelled wheelchair      LABS:  Per facility protocol     CMP, CBC each month for 3 months   PT-INR each week for 1 month then monthly   Pre-albumin each month for 3 months   Digoxin level in 1 month and every 6 months   TSH every year   Dilantin level in 1 month and every 3 months   Tegretol level in 1 month and every 3 months    Phenobarbital level in 1 month and every 3 months  Nursing Precautions: - Fall precautions per nursing home protocol   CONSULTS:      Physical Therapy to evaluate and treat     Occupational Therapy to evaluate and treat                DIABETES CARE:      Check blood sugar:   Fingerstick blood sugar a.m and p.m.        Report CBG < 60 or > 400 to physician.                                          Insulin Sliding Scale          Glucose  Novolog Insulin Subcutaneous        0 - 60   Orange juice or glucose tablet, hold insulin      No insulin   201-250  2 units   251-300  4 units   301-350  6 units   351-400  8 units   >400   10 units then call physician      Medications: Discontinue all previous medication orders, if any. See new list below.      Sisi Medel   Home Medication Instructions RANDALL:[id number]    Printed on:06/10/19 1017   Medication Information                      aspirin (ECOTRIN) 81 MG EC tablet  Take 81 mg by mouth once daily.             atorvastatin (LIPITOR) 80 MG tablet  TAKE 1 TABLET BY MOUTH EVERY EVENING             blood sugar diagnostic Strp  1 each by Misc.(Non-Drug; Combo Route) route once daily.             cefTRIAXone 2 g in dextrose 5 % 50 mL (ROCEPHIN) 2 g/50 mL PgBk IVPB  Inject 50 mLs (2 g total) into the vein once daily. END DATE: 6/14/2019 for 14 days             cinacalcet (SENSIPAR) 30 MG Tab  Take 30 mg by mouth daily with breakfast.             clopidogrel (PLAVIX) 75 mg tablet  TAKE 1 TABLET(75 MG) BY MOUTH EVERY DAY             fludrocortisone (FLORINEF) 0.1 mg Tab  Take 1 tablet (100 mcg total) by mouth 2 (two) times daily.              gabapentin (NEURONTIN) 600 MG tablet  Take 600 mg by mouth 2 (two) times daily.              lancets Misc  1 each by Misc.(Non-Drug; Combo Route) route once daily.             levETIRAcetam (KEPPRA) 500 MG Tab  TAKE 1 TABLET BY MOUTH TWICE DAILY             midodrine (PROAMATINE) 10 MG tablet  Take 1 tablet (10 mg total) by mouth 3 (three) times daily.             nortriptyline (PAMELOR) 25 MG capsule  Take 1 capsule by mouth nightly             omeprazole (PRILOSEC) 20 MG capsule  TAKE 1 CAPSULE BY MOUTH EVERY DAY             patiromer calcium sorbitex (VELTASSA) 16.8 gram PwPk  Take 33.6 g by mouth once daily.              prednisoLONE acetate (PRED FORTE) 1 % DrpS  INSTILL ONE DROP INTO  LEFT EYE THREE TIMES A DAY             predniSONE (DELTASONE) 20 MG tablet  Take 0.5 tablets (10 mg total) by mouth once daily.             pregabalin (LYRICA) 100 MG capsule  Take 1 capsule (100 mg total) by mouth 3 (three) times daily.             PROLENSA 0.07 % Drop  INSTILL 1 DROP IN LEFT / RIGHT EYE QD             DENISE-LINDA 0.8 mg Tab  TAKE 1 TABLET BY MOUTH ONCE DAILY             RENVELA 800 mg Tab  TAKE 1 TABLET BY MOUTH THREE TIMES DAILY WITH MEALS             tiZANidine (ZANAFLEX) 4 MG tablet  TAKE 1 TABLET BY MOUTH EVERY DAY AS NEEDED FOR MUSCLE SPASMS             TRADJENTA 5 mg Tab tablet  TAKE 1 TABLET(5 MG) BY MOUTH EVERY DAY             warfarin (COUMADIN) 5 MG tablet  TAKE 1 TABLET BY MOUTH EVERY DAY EXCEPT TH 1AND 1/2 TABLETS ON MONDAY AND FRIDAY OR AS DIRECTED                       _________________________________  Gage Suh MD  06/10/2019

## 2019-06-10 NOTE — PROGRESS NOTES
[facility] Hospital Medicine  Progress Note    Patient Name: Sisi Medel  MRN: [id number]  Patient Class: IP- Inpatient   Admission Date: 5/30/2019  Length of Stay: 11 days  Attending Physician: Jeanne Ortega MD  Primary Care Provider: Carlos A Caputo MD    Hospital Medicine Team: [facility] HOSP MED 5 VINAY Suh MD    Subjective:     Principal Problem:Gram-negative bacteremia    HPI:  Ms. Medel is a 58 year old lady with a past medical history of pulmonary sarcoidosis on 3-5L home oxygen, Class 3 HFrEF (3/2019 EF 35%), perm Afib/flutter s/p 6/2017 AVN ablation & PPM, ESRD (TTS), Type 2 DM (3/2019 A1c 6.2%), pulmonary HTN, YOANDY, and orthostatic hypotension who presented to [facility] ED with complaints of shortness of breath. The patient went to dialysis today and apparently could not get down to her dry weight, per neprhology should be 111kg but patient only got to 112. Per the patient her BP's were fine and she did not have any trouble with the dialysis. Upon returning to the car the patient had worsening shortness of breath and had to increase O2 to 5L. Per the patient and her family these symptoms have been persistent for several weeks. She was discharged from [facility] yesterday (5/29/19) following a 2 day admission for similar complaints. At that time the patient needed dialysis for volume overload and new dry weight was established.    Of note patient has had frequent hospitalizations in the past. These admissions are usually for volume overload, CHF, and shortness of breath. She follows with pulmonology as an outpatient and is on chronic steroids. Per last outpatient note, continued on prednisone 20mg daily for sarcoid as she has had difficulty and increased hospitalizations on lower doses.    In the ED the patient was on BiPAP, chest Xray showing significant pulmonary edema. Nephology was consulted and started dialysis in room. On evaluation patient was uncomfortable on bipap but no  increased work of breathing noted. Patient endorsing neck pain from the bipap and leg pain. She denied recent fevers or chills, but did endorse a chronic cough that had been worsening recently.    Hospital Course:  Ms. Medel was initially admitted to hospital medicine on 5/30 for increased work of breathing and pulmonary edema thought to be secondary to insufficient removal of fluid in outpatient dialysis. She was dialyzed in the ED, however her work of breathing significantly increased and she was admitted to the ICU. Her blood cultures drawn on admission grew Klebsiella oxytoca and Streptococcus salivarius, and she was started on IV Vancomycin and Zosyn by the ICU team. Vancomycin was discontinued on 6/2.  Her respiratory status improved after admission to the ICU and further dialysis, and she was stepped down to hospital medicine on 6/2. A CT Chest/abd/pelvis was ordered to evaluate for source of bacteremia but was unremarkable. Infectious disease was consulted on 6/2 and recommended two week course of ceftriaxone. She had clinically improved and was discharged home with home health and home IV antibiotics on 6/3.     Interval History: Continue to make improvement. She slept well last night and continue to work with incentive spirometer. Patient in cheer mood this morning wanting to work with PT/OT. Patient denied sob, chill fever, nausea vomiting and urinary difficulty.     Review of Systems   Constitutional: Negative for chills, fatigue and fever.   HENT: Negative for rhinorrhea, sneezing and sore throat.    Eyes: Negative for visual disturbance.   Respiratory: Positive for cough (nonproductive improving). Negative for chest tightness, shortness of breath and wheezing.    Cardiovascular: Negative for chest pain, palpitations and leg swelling.   Gastrointestinal: Negative for abdominal distention, abdominal pain, constipation, diarrhea, nausea and vomiting.   Genitourinary: Negative for dysuria, flank pain  and hematuria.   Musculoskeletal: Positive for gait problem. Negative for arthralgias, back pain, myalgias, neck pain and neck stiffness.   Skin: Negative for color change and rash.   Neurological: Positive for weakness. Negative for dizziness, light-headedness and headaches.   Psychiatric/Behavioral: Negative for agitation, behavioral problems, confusion and decreased concentration.     Objective:     Vital Signs (Most Recent):  Temp: 97.7 °F (36.5 °C) (06/10/19 0727)  Pulse: 70 (06/10/19 0727)  Resp: 15 (06/10/19 0727)  BP: 102/69 (06/10/19 0727)  SpO2: 100 % (06/10/19 0727) Vital Signs (24h Range):  Temp:  [96 °F (35.6 °C)-98.3 °F (36.8 °C)] 97.7 °F (36.5 °C)  Pulse:  [69-72] 70  Resp:  [14-19] 15  SpO2:  [96 %-100 %] 100 %  BP: (102-118)/(68-81) 102/69     Weight: 108.2 kg (238 lb 8.6 oz)  Body mass index is 36.27 kg/m².    Intake/Output Summary (Last 24 hours) at 6/10/2019 0839  Last data filed at 6/10/2019 0639  Gross per 24 hour   Intake 607 ml   Output 0 ml   Net 607 ml      Physical Exam   Constitutional: She is oriented to person, place, and time. She appears well-developed and well-nourished. No distress.   HENT:   Head: Normocephalic and atraumatic.   Mouth/Throat: Oropharynx is clear and moist.   Eyes: Pupils are equal, round, and reactive to light. EOM are normal.   Neck: Normal range of motion. Neck supple.   Cardiovascular: Normal rate, regular rhythm, normal heart sounds and intact distal pulses.   No murmur heard.  Pulmonary/Chest: Effort normal. No respiratory distress. She has no wheezes. She has rales (left lung field ).   Abdominal: Soft. Bowel sounds are normal. She exhibits no distension. There is no tenderness. There is no guarding.   Musculoskeletal: Normal range of motion. She exhibits no edema, tenderness or deformity.   Neurological: She is alert and oriented to person, place, and time. No cranial nerve deficit.   Skin: Skin is warm and dry. No rash noted. She is not diaphoretic. No  erythema. No pallor.   Ulcers on left and right foot.   Psychiatric: She has a normal mood and affect. Her behavior is normal.   Nursing note and vitals reviewed.      Significant Labs:   CBC:   Recent Labs   Lab 06/09/19  0521   WBC 13.63*   HGB 12.8   HCT 41.7        CMP:   Recent Labs   Lab 06/09/19  0521   *   K 4.0   CL 88*   CO2 23   *   BUN 29*   CREATININE 5.1*   CALCIUM 8.5*   PROT 7.6   ALBUMIN 3.1*   BILITOT 0.4   ALKPHOS 129   AST 32   ALT 58*   ANIONGAP 16   EGFRNONAA 8.7*       Significant Imaging: I have reviewed all pertinent imaging results/findings within the past 24 hours.    Assessment/Plan:      * Gram-negative bacteremia  Blood cultures: Klebsiella oxytoca and Streptococcus salivarius. Repeat BCx NGTD    CT Chest/Abd/Pelvis: Symmetric central lung hazy and streaky opacities are consistent with sarcoidosis similar with 03/07/2016.  There is probably superimposed subsegmental atelectasis at the bases.  No current adenopathy. No intra-abdominal inflammatory process    - IV ceftriaxone x2 weeks per ID - end of therapy 6/14/2019   - midline catheter in place  - no source identified, Gastrointestinal translocation possibility.         Discharge planning issues  PT/OT consulted    - Nursing home placement referral out and family looking at options.   - IV abx until 6/14 will require up on discharge. Patient with midline       Other streptococcal sepsis        Transaminitis  Suspect secondary to sepsis, has been downtrending. Biliary duct appeared normal on CT abd       Wound of right leg  - HH wound care   - Wound care consulted       Paroxysmal A-fib  Home med: warfarin   INR: 2.8 on 6/3    - resume home dosing of warfarin       ESRD on hemodialysis  Nephrology consulted    - HD per nephro TuThur and Sat schedule  - daily renal labs          Acute on chronic combined systolic and diastolic heart failure  Echo 3/11/19: EF 35% with diastolic dysfunction    Home meds: aspirin,  atorvastatin 80mg    - not on GDMT on admission  - BP too low chronically for ACE, BB, etc       Diabetes mellitus, type 2  Home med: tradjenta  A1c: 6.5    - resume tradjenta on discharge    - Stable while in hospital      Secondary adrenal insufficiency  Home meds: prednisone 20mg daily for 3 days then restart home dose. Conintue fludrocortisone, midodrine    - Prednisone 10mg starting 6/7/19   - de-escalate to home steroid dose. No need for taper with only 4 days of high dose steroids  - continue fludrocortisone, midodrine      Epilepsy  Home med: keppra 500mg bid    - continue      Pulmonary sarcoidosis    On 4L O2 chronically    - will need outpatient follow up with pulmonology  - Continue steroid 10mg       VTE Risk Mitigation (From admission, onward)        Ordered     IP VTE HIGH RISK PATIENT  Once      05/30/19 5674              VINAY Suh MD  Department of Hospital Medicine   [facility]

## 2019-06-10 NOTE — PLAN OF CARE
Sw did setup w/c van transport for 5pm to [facility], nurse to call report to [facility] at [phone] and son is aware of transport.

## 2019-06-10 NOTE — PROGRESS NOTES
"  [facility]  Adult Nutrition  Consult Note    SUMMARY     Recommendations    1. Continue current Renal, Diabetic diet + Novasource ONS (fluid restriction per  MD).   2. RD to monitor & follow-up.    Goals: PO Intake >50%  Nutrition Goal Status: new  Communication of RD Recs: reviewed with RN    Reason for Assessment    Reason For Assessment: length of stay  Diagnosis: other (see comments)(Bacteremia)  Relevant Medical History: ESRD on HD, HTN, CHF  Interdisciplinary Rounds: did not attend    General Information Comments: Pt w/ good appetite, consuming 100% of meals, tolerating diet. Stable wt PTA. NFPE not indicated, pt appears nourished. Pt on HD.   Nutrition Discharge Planning: Adequate PO intake    Nutrition/Diet History    Patient Reported Diet/Restrictions/Preferences: renal  Spiritual, Cultural Beliefs, Jewish Practices, Values that Affect Care: no  Factors Affecting Nutritional Intake: None identified at this time    Anthropometrics    Temp: 97.4 °F (36.3 °C)  Height Method: Stated  Height: 5' 8" (172.7 cm)  Height (inches): 68 in  Weight Method: Bed Scale  Weight: 108.2 kg (238 lb 8.6 oz)  Weight (lb): 238.54 lb  Ideal Body Weight (IBW), Female: 140 lb  % Ideal Body Weight, Female (lb): 170.39 lb  BMI (Calculated): 36.3  BMI Grade: 35 - 39.9 - obesity - grade II  Usual Body Weight (UBW), k kg  % Usual Body Weight: 99.47    Lab/Procedures/Meds    Pertinent Labs Reviewed: reviewed  Pertinent Labs Comments: Na 127, BUN 29, Creat 5.1, GFR 8.7, A1C 6.5  Pertinent Medications Reviewed: reviewed  Pertinent Medications Comments: Warfarin    Estimated/Assessed Needs    Weight Used For Calorie Calculations: 108.2 kg (238 lb 8.6 oz)     Energy Calorie Requirements (kcal): 2145 kcal/d   Energy Need Method: [place](1.25 PAL)     Protein Requirements: 108-130 g/d (1-1.2 g/kg)  Weight Used For Protein Calculations: 108.2 kg (238 lb 8.6 oz)     Estimated Fluid Requirement Method: other " (see comments)(Per MD or 1 mL/kcal)     Nutrition Prescription Ordered    Current Diet Order: Renal, Diabetic  Nutrition Order Comments: 800 mL FR  Oral Nutrition Supplement: Novasource ONS    Evaluation of Received Nutrient/Fluid Intake    Comments: LBM: 6/9    Tolerance: tolerating    Nutrition Risk    Level of Risk/Frequency of Follow-up: (1x/week)     Assessment and Plan    No nutritional dx at this time.      Monitor and Evaluation    Food and Nutrient Intake: energy intake, food and beverage intake  Food and Nutrient Adminstration: diet order  Physical Activity and Function: nutrition-related ADLs and IADLs  Anthropometric Measurements: weight, weight change  Biochemical Data, Medical Tests and Procedures: inflammatory profile, lipid profile, glucose/endocrine profile, gastrointestinal profile, electrolyte and renal panel  Nutrition-Focused Physical Findings: overall appearance     Nutrition Follow-Up    RD Follow-up?: Yes

## 2019-06-10 NOTE — PLAN OF CARE
SANJU spoke with Maxine from [facility] regarding discharge today. Reports that son will Not proceed with transfer until he has toured the facility he was informed when Liaison leaves each day which is 430p and agreed to arrive before then so patient can be transferred today if facility is acceptable to family. Call placed to son Kenan to verify this and he did not answer. Will continue to monitor.

## 2019-06-11 NOTE — PLAN OF CARE
Problem: Physical Therapy Goal  Goal: Physical Therapy Goal  Goals to be met by: 19     Patient will increase functional independence with mobility by performin. Supine to sit with Set-up [place]  2. Sit to supine with Stand-by Assistance  3. Sit to stand transfer with Stand-by Assistance  4. Bed to chair transfer with Stand-by Assistance using Rolling Walker  5. Gait  x 100 feet with Stand-by Assistance using Rolling Walker.   6. Ascend/descend 5 stair with right OR left Handrails Contact Guard Assistance   7. Stand for 2 minutes with Stand-by Assistance using Rolling Walker while performing activity    Outcome: Ongoing (interventions implemented as appropriate)  PT ayanna completed. Pt will begin PT POC.    Erica Mast, PT  2019

## 2019-06-11 NOTE — PLAN OF CARE
Recommendations     1. Continue current Renal, Diabetic diet + Novasource ONS (fluid restriction per  MD).   2. RD to monitor & follow-up.     Goals: PO Intake >50%  Nutrition Goal Status: new

## 2019-06-11 NOTE — PT/OT/SLP DISCHARGE
Physical Therapy Discharge Summary    Name: Sisi Medel  MRN: [id number]   Principal Problem: Gram-negative bacteremia     Patient Discharged from acute Physical Therapy on 6/10/19.  Please refer to prior PT noted date on 6/10/19 for functional status.     Assessment:     Goals partially met.    Objective:     GOALS:   Multidisciplinary Problems     Physical Therapy Goals     Not on file          Multidisciplinary Problems (Resolved)        Problem: Physical Therapy Goal    Goal Priority Disciplines Outcome Goal Variances Interventions   Physical Therapy Goal   (Resolved)     PT, PT/OT Outcome(s) achieved     Description:  Goals to be met by: 6/15/19    Patient will increase functional independence with mobility by performin. Supine to sit with Stand-by Assistance - met  2. Sit to stand transfer with Supervision with AD -not met  3. Gait  x 150 feet with Supervision using Rolling Walker. -not met  4. Ascend/descend 5 stair with right Handrails Contact Guard Assistance  -not met                       Reasons for Discontinuation of Therapy Services  Transfer to alternate level of care.      Plan:     Patient Discharged to: Skilled Nursing Facility.    Lilian Larose, PT  2019

## 2019-06-11 NOTE — PT/OT/SLP EVAL
PhysicalTherapy   Evaluation    Sisi Medel   MRN: [id number]     PT Received On: 06/11/19  PT Start Time: 1456     PT Stop Time: 1517    PT Total Time (min): 21 min       Billable Minutes:  Evaluation 10 and Therapeutic Exercise 11    Diagnosis: Acute on Chronic Respiratory  Failure w/ Hypoxia  Past Medical History:   Diagnosis Date    Anemia in ESRD (end-stage renal disease) 3/7/2016    Anticoagulant long-term use     Cervical radiculopathy 7/20/2015    CHF (congestive heart failure)     Chronic combined systolic and diastolic heart failure 6/14/2013    EF 20% 2013, improved with Medical therapy, negative PET 2013    Chronic respiratory failure with hypoxia 04/22/2013    On home oxygen at 2-5 liters    Closed fracture of proximal end of right fibula 6/27/2016    Complications due to renal dialysis device, implant, and graft     DDD (degenerative disc disease), lumbar 6/17/2015    Dependence on renal dialysis     Diabetes mellitus type II, controlled 12/8/2017    ESRD on hemodialysis 2/23/2016    Essential hypertension     Gout     Lateral meniscal tear 5/31/2016    Lumbar stenosis 6/17/2015    Obesity, Class III, BMI 40-49.9 (morbid obesity)     Pacemaker     Paroxysmal atrial fibrillation 5/16/2014    Not on anticoagulation    Peripheral neuropathy 11/13/2013    Personal history of gastric ulcer 3/19/2013    Pneumonia of left lower lobe due to infectious organism 3/10/2019    Sarcoidosis, lung 2009    Diagnosed in 2009 with ocular manifestation, on 4L home O2 and PO steroids    Secondary pulmonary hypertension 4/7/2015    Seizure disorder 3/19/2013    Shingles 11/13/2013    Thyroid disease       Past Surgical History:   Procedure Laterality Date    ABDOMINAL SURGERY      ABLATION N/A 6/12/2017    [facility]    ANGIOPLASTY Left 1/10/2018    Performed by [facility]rey [facility]af[facility] at [facility] OR 2ND FLR    ANGIOPLASTY-PERCUTANEOUS TRANSLUMINAL  (PTA) Left 2017    [facility] by [facility] at [facility] OR 2ND FLR    ANGIOPLASTY-PERCUTANEOUS TRANSLUMINAL (PTA) Left 10/12/2016    Performed by [facility] at [facility] OR 2ND FLR    CARDIAC PACEMAKER PLACEMENT       SECTION      x2    DECLOT GRAFT PERCUTANEOUS Left 2017    Performed by [facility] MD at [facility] OR 2ND FLR    DECLOT-GRAFT Left 2016    Performed by Harjit Starr MD at [facility] CATH LAB    DECLOT-GRAFT Left 2016    Performed by [facility]it St[facility] MD at [facility] CATH LAB    ECHOCARDIOGRAM-TRANSESOPHAGEAL N/A 2013    Performed by Delmy Surgeon at [facility] DELMY    Fistulogram Left 2019    Performed by [facility] MD at [facility] CATH LAB    fistulogram Left 1/10/2018    Performed by [facility] at [facility] OR 2ND FLR    FISTULOGRAM Left 2017    Performed by [facility] at [facility] CATH LAB    FISTULOGRAM Left 10/12/2016    Performed by [facility] MD at [facility] OR 2ND FLR    FISTULOGRAM Left 2016    Performed by Harjit Starr MD at [facility] CATH LAB    Fistulogram, LUE AVG, possible intervention Left 2019    Performed by Tor[facility]afana MD at [facility] CATH LAB    Fistulogram, OR 11 Left 2019    Performed by Torrey [facility]af[facility] MD at [facility] OR 2ND FLR    INJECTION-STEROID-EPIDURAL-TRANSFORAMINAL Right 2015    Per[facility] by [facility] at [facility] PAIN MGT    INJECTION-STEROID-EPIDURAL-TRANSFORAMINAL Right 2015    Performed by [facility]rez [facility] at [facility] PAIN MGT    [id number] / Left upper AV graft. Left 2/3/2016    Performed by [facility] at [facility] OR 2ND FLR    SEDKZAQLB-[id number] N/A 2017    Performed by Bladimir Adorno MD at [facility] CATH LAB    OTHER SURGICAL HISTORY      loop recorder implant    PLACEMENT-STENT Left 2017    Performed by [facility] MD at [facility] OR 2ND FLR    PTA, AV FISTULA N/A 2019    Performed by Torrey LOCKETT  MD Broderick at [facility] CATH LAB    stent to fistula      TRANSESOPHAGEAL ECHOCARDIOGRAM (JARAD) N/A 6/27/2016    Performed by Sourav Machuca MD at [facility] CATH LAB    ULTRASOUND, UPPER GI TRACT, ENDOSCOPIC N/A 1/9/2014    Performed by Stewart Burgess MD at [facility] ENDO (2ND FLR)    VASCULAR SURGERY      fistula to L upper arm          General Precautions: Standard, fall, seizure  Orthopedic Precautions: N/A   Braces: N/A    Spiritual, Cultural Beliefs, Orthodoxy Practices, Values that Affect Care: no    Patient History:  Lives With: significant other  Living Arrangements: house  Home Accessibility: stairs to enter home  Home Layout: Able to live on 1st floor  Stair Railings at Home: outside, present at both sides  Transportation Anticipated: family or friend will provide  Living Environment Comment: Pt lives w/ her S.O. in a 1SH w/ 5 KAMERON and BHR that are far apart. She has a tub/shower combo w/ a grab bar. Pt's S.O. is able to and available to assist her 24/7 upon D/C.  Equipment Currently Used at Home: bedside commode, oxygen, rollator  DME owned (not currently used): rolling walker and wheelchair    Previous Level of Function: PTA pt was Cecil using rollator for community mobility. She reports holding on to furniture when walking around the house. Pt reports 2 falls in the past 6 months both caused by feeling light headed. Pt wears home O2 at all times on 5lpm.   Ambulation Skills: needs device  Transfer Skills: needs device  ADL Skills: needs device    Subjective:  Communicated with patient prior to session.  Pt agreeable to short evaluation. Pt was fatigued following HD this AM.   Chief Complaint: weakness  Patient goals: to get stronger    Pain/Comfort  Pain Rating 1: 0/10    Objective:  Patient found supine in bed. Patient found with: oxygen    Cognitive Exam:  Oriented to: Person, Place, Time and Situation  Follows Commands/attention: Follows two-step commands  Communication: clear/fluent  Safety  awareness/insight to disability: intact    Physical Exam:  Postural examination/scapula alignment:    -       Rounded shoulders  -       Forward head    Skin integrity: Visible skin intact  Edema: None noted     Sensation:      -       Intact    Upper Extremity Range of Motion and Strength:  See OT ayanna for UE details    Lower Extremity Range of Motion:  Right Lower Extremity: WFL  Left Lower Extremity: WFL    Lower Extremity Strength:  Right Lower Extremity: HF 3+/5, knee 4/5, DF/PF 4/5  Left Lower Extremity: HF 3+/5, knee flex/ext 4/5, DF/PF 4/5    Gross motor coordination: WFL    AM-PAC 6 CLICK MOBILITY  Total Score:14    Bed Mobility:  Sit>Supine:on bed w/ ModA for BLE  Supine>Sit: on bed w/ SBA  HOB elevated and w/ side rail    Transfers:  Sit<>Stand: to/from EOB w/ RW and Jj to rise  Stand Pivot Transfer: pt declined  Cueing for hand placement and forward lean    Gait:  Pt declined due to fatigue     Therex:  Seated BLE therex x10 reps (GS, HF, LAQ, AP)  Cueing for form    Balance:  Static sit EOB w/ SBA, ~10min  Static stand w/ RW and CGA, ~30s, limited in duration by fatigue    Additional Treatment:  Pt was educated on PT role and POC. Pt verb understanding.     Patient left supine with all lines intact and call button in reach.    Assessment:  Sisi Medel is a 58 y.o. female with a medical diagnosis of  Acute on Chronic Respiratory  Failure w/ Hypoxia. Pt was limited t/o session by fatigue due to HD in AM. PTA pt was Cecil w/ mobility but now due to the current below listed deficits she requires Jj for transfers. She was unable to ambulate due to fatigue. Pt is appropriate for skilled PT and will begin PT POC.    Rehab identified problem list/impairments: weakness, impaired endurance, impaired self care skills, impaired functional mobilty, gait instability, impaired balance, decreased lower extremity function    Rehab potential is good.    Activity tolerance: Fair    Discharge recommendations:  home with home health     Barriers to discharge: Inaccessible home environment    Equipment recommendations: tub bench     GOALS:   Multidisciplinary Problems     Physical Therapy Goals        Problem: Physical Therapy Goal    Goal Priority Disciplines Outcome Goal Variances Interventions   Physical Therapy Goal     PT, PT/OT Ongoing (interventions implemented as appropriate)     Description:  Goals to be met by: 19     Patient will increase functional independence with mobility by performin. Supine to sit with Set-up [place]  2. Sit to supine with Stand-by Assistance  3. Sit to stand transfer with Stand-by Assistance  4. Bed to chair transfer with Stand-by Assistance using Rolling Walker  5. Gait  x 100 feet with Stand-by Assistance using Rolling Walker.   6. Ascend/descend 5 stair with right OR left Handrails Contact Guard Assistance   7. Stand for 2 minutes with Stand-by Assistance using Rolling Walker while performing activity                      PLAN:    Patient to be seen 5 x/week  to address the above listed problems via gait training, therapeutic activities, therapeutic exercises  Plan of Care Expires: 19    Erica Mast, PT 2019

## 2019-06-11 NOTE — PT/OT/SLP EVAL
Occupational Therapy  Evaluation/tx    Sisi Medel   MRN: [id number]   Admitting Diagnosis:debility/:Gram-negative bacteremia    OT Date of Treatment: 06/11/19   OT Start Time: 1400  OT Stop Time: 1425  OT Total Time (min): 25 min    Billable Minutes:  Evaluation 15  Self Care/Home Management 10    Diagnosis: debility/:Gram-negative bacteremia    Past Medical History:   Diagnosis Date    Anemia in ESRD (end-stage renal disease) 3/7/2016    Anticoagulant long-term use     Cervical radiculopathy 7/20/2015    CHF (congestive heart failure)     Chronic combined systolic and diastolic heart failure 6/14/2013    EF 20% 2013, improved with Medical therapy, negative PET 2013    Chronic respiratory failure with hypoxia 04/22/2013    On home oxygen at 2-5 liters    Closed fracture of proximal end of right fibula 6/27/2016    Complications due to renal dialysis device, implant, and graft     DDD (degenerative disc disease), lumbar 6/17/2015    Dependence on renal dialysis     Diabetes mellitus type II, controlled 12/8/2017    ESRD on hemodialysis 2/23/2016    Essential hypertension     Gout     Lateral meniscal tear 5/31/2016    Lumbar stenosis 6/17/2015    Obesity, Class III, BMI 40-49.9 (morbid obesity)     Pacemaker     Paroxysmal atrial fibrillation 5/16/2014    Not on anticoagulation    Peripheral neuropathy 11/13/2013    Personal history of gastric ulcer 3/19/2013    Pneumonia of left lower lobe due to infectious organism 3/10/2019    Sarcoidosis, lung 2009    Diagnosed in 2009 with ocular manifestation, on 4L home O2 and PO steroids    Secondary pulmonary hypertension 4/7/2015    Seizure disorder 3/19/2013    Shingles 11/13/2013    Thyroid disease       Past Surgical History:   Procedure Laterality Date    ABDOMINAL SURGERY      ABLATION N/A 6/12/2017    Performed by lBadimir Adorno MD at [facility] CATH LAB    ANGIOPLASTY Left 1/10/2018    Performed by Torrey Villafana MD at [facility] OR  2ND FLR    ANGIOPLASTY-PERCUTANEOUS TRANSLUMINAL (PTA) Left 2017    [facility] by [facility] at [facility] OR 2ND FLR    ANGIOPLASTY-PERCUTANEOUS TRANSLUMINAL (PTA) Left 10/12/2016    Performed by [facility] MD at [facility] OR 2ND FLR    CARDIAC PACEMAKER PLACEMENT       SECTION      x2    DECLOT GRAFT PERCUTANEOUS Left 2017    Performed by [facility] MD at [facility] OR 2ND FLR    DECLOT-GRAFT Left 2016    Performed by Harjit Starr MD at [facility] CATH LAB    DECLOT-GRAFT Left 2016    Performed by [facility]it Starr MD at [facility] CATH LAB    ECHOCARDIOGRAM-TRANSESOPHAGEAL N/A 2013    Performed by Delmy Surgeon at [facility] DELMY    Fistulogram Left 2019    Performed by Tor[facility] MD at [facility] CATH LAB    fistulogram Left 1/10/2018    Performed by Tor[facility] MD at [facility] OR 2ND FLR    FISTULOGRAM Left 2017    Performed by [facility] MD at [facility] CATH LAB    FISTULOGRAM Left 10/12/2016    Performed by [facility] MD at [facility] OR 2ND FLR    FISTULOGRAM Left 2016    Performed by [facility]it Starr MD at [facility] CATH LAB    Fistulogram, LUE AVG, possible intervention Left 2019    Performed by [facility] MD at [facility] CATH LAB    Fistulogram, OR 11 Left 2019    Performed by Torrey [facility] MD at [facility] OR 2ND FLR    INJECTION-STEROID-EPIDURAL-TRANSFORAMINAL Right 2015    [facility]formed by [facility] at [facility] PAIN MGT    INJECTION-STEROID-EPIDURAL-TRANSFORAMINAL Right 2015    Performed by [facility] MD at [facility] PAIN MGT    [id number] / Left upper AV graft. Left 2/3/2016    Performed by [facility] MD at [facility] OR 2ND FLR    [id number]-[id number] N/A 2017    Performed by Bladimir Adorno MD at [facility] CATH LAB    OTHER SURGICAL HISTORY      loop recorder implant    PLACEMENT-STENT Left 2017    Performed by [facility] at [facility] OR 2ND FLR    PTA,  "AV FISTULA N/A 1/30/2019    Performed by [facility] at [facility] CATH LAB    stent to fistula      TRANSESOPHAGEAL ECHOCARDIOGRAM (JARAD) N/A 6/27/2016    Performed by Sourav Machuca MD at [facility] CATH LAB    ULTRASOUND, UPPER GI TRACT, ENDOSCOPIC N/A 1/9/2014    Performed by Stewart Burgess MD at [facility] ENDO (2ND FLR)    VASCULAR SURGERY      fistula to L upper arm          General Precautions: Standard, fall, seizure(renal diet )  Orthopedic Precautions: N/A  Braces: N/A    Spiritual, Cultural Beliefs, Scientologist Practices, Values that Affect Care: no     Patient History:  Lives With: significant other  Home Accessibility: stairs to enter home  Home Layout: (6)  Stair Railings at Home: inside, present at both sides  Equipment Currently Used at Home: rollator, oxygen(handicapped toilet)    Prior level of function:    Bed Mobility/Transfers: independent  Grooming: independent  Bathing: independent(at sink)  Upper Body Dressing: independent  Lower Body Dressing: independent  Toileting: needs device(has handicapped commode)  Driving License: Yes  Occupation: On disability  Leisure and Hobbies: read, computer, watch tv  IADL Comments: Per pt. report:  She resides with her boyfriend in a SS house with 6 steps to enter and BHR.  pt. was able to ambulate in the house without an AD and could dress herself.  pt. utilized a rollator in the community.  pt. is on home 02.  Pt. was able to go into community to perform activities. Pt.'s boyfriend is available to assist on d/c. Pt. reports having a fall in the hospital after having HD because "wiped Out"     Dominant hand: right    Subjective:  Communicated with nurse prior to session.    Chief Complaint: Feeling very weak from HD  Patient/Family stated goals: Pt. Would like to return to previous level of functioning    Pain/Comfort  Pain Rating 1: 0/10  Pain Rating Post-Intervention 1: 0/10    Objective:   Patient found with: (seated in w/c nurse and boyfriend " present)    Cognitive Exam:  Oriented to: Person, Place, Time and Situation  Follows Commands/attention: Follows multistep  commands  Communication: clear/fluent  Memory:  No Deficits noted  Safety awareness/insight to disability: intact  Coping skills/emotional control: Appropriate to situation    Visual/perceptual:  Intact    Physical Exam:  Postural examination/scapula alignment:    -       Rounded shoulders  -       Forward head  -       Posterior pelvic tilt  Skin integrity: BLE with mepilex on heels ; bloody drainage noted on gowns at HD site; CN assessed and reported HD was clear  Edema: None noted in BUE    Sensation:      -       Intact for light touch in BUE    Upper Extremity Range of Motion:  Right Upper Extremity: WFL  Left Upper Extremity: WFL    Upper Extremity Strength:  Right Upper Extremity: WFL  Left Upper Extremity: WFL   Strength: good    Fine motor coordination:   Not assessed on this date    Gross motor coordination: WFL in BUE    Occupational Performance:    Bed Mobility:    · Patient completed Sit to Supine with moderate assistance with assist at BLE    Functional Mobility/Transfers:  · Patient completed Sit <> Stand Transfer with minimum assistance  with  rolling walker x 2 people for safety  · Patient completed Bed <> Chair Transfer using Stand Pivot technique with minimum assistance and of 2 persons with bed rail   · Functional Mobility: not tested    Activities of Daily Living:  · Bathing: moderate assistance sponge bath seated EOB  · Upper Body Dressing: minimum assistance to don gown forward  · Lower Body Dressing: max  assistance to doff pants and socks    AMPAC 6 Click:  AMPAC Total Score: 16    OT Exercises: not tested    Additional Treatment:  Pt. Educated on role of OT and POC  Pt. Educated on safety with transfers and ADL tasks  Pt. Educated on floating heels in bed to assist with maintaining skin integrity  Pt. Educated on need for staff assist for all mobility    Patient  left supine with call button in reach, nurse notified and significant other present    Assessment:  Sisi Medel is a 58 y.o. female with a medical diagnosis of    debility/:Gram-negative bacteremia and presents with deficits in self-care skills, functional mobility as well as endurance.  Pt. Noted to have increased fatigue on this date from HD.  Pt. Would benefit from continued OT services to maximize safety and independence with ADL tasks.     Rehab identified problem list/impairments: impaired endurance, impaired self care skills, impaired functional mobilty    Rehab potential is good    Activity tolerance: Fair    Discharge recommendations:   HH OT with S    Barriers to discharge: None     Equipment recommendations: (TBD as pt. progresses)     GOALS:   Multidisciplinary Problems     Occupational Therapy Goals        Problem: Occupational Therapy Goal    Goal Priority Disciplines Outcome Interventions   Occupational Therapy Goal     OT, PT/OT     Description:  Goals to be met by: 06-24-19     Patient will increase functional independence with ADLs by performing:    UE Dressing with Set-up Assistance.  LE Dressing with Supervision.  Grooming while standing with Supervision.  Toileting from bedside commode with Supervision for hygiene and clothing management.   Bathing from  edge of bed with Supervision.  Supine to sit with Modified [place].  Stand pivot transfers with Supervision.  Toilet transfer to bedside commode with Supervision.  Pt. To be I with HEP for BUE to improve level of endurance  Pt. To engage in dynamic standing activity x 10 minutes with S                      PLAN: Patient to be seen 5 x/week to address the above listed problems via self-care/home management, therapeutic activities, therapeutic exercises  Plan of Care expires: 07/11/19  Plan of Care reviewed with: patient, significant other    BALTA Kitchen  06/11/2019

## 2019-06-11 NOTE — CONSULTS
Wound care consulted for bilateral heels  The heels are intact, skin dry- applied Sween 24 lotion to the skin- protective foams placed over both heels.    The left great toe has a resolving wound- partial thickness skin tear- ~ 0.5 cm x 0.5 cm x 0.  Cleansed with sterile normal saline and foam dressing applied over area.   The right shin has a partial thickness skin tear over scar tissue.  No erythema, no drainage. Cleansed with sterile normal saline/foam dressing in place.   Sacral foam dressing in place for preventive measure.   Nursing to change dressings according to skin tear standing orders, wound care signing off.        06/11/19 1600        Wound 06/04/19 Laceration Toe, first   Date First Assessed: 06/04/19   Pre-existing: (c)   Primary Wound Type: Laceration  Side: Left  Location: Toe, first   Wound Image    Wound WDL ex   Dressing Appearance Dry;Intact;Clean   Drainage Amount None   Drainage Characteristics/Odor No odor   Appearance Pink;Dry   Tissue loss description Not applicable   Periwound Area Intact;Dry   Wound Edges Open   Wound Length (cm) 0.5 cm   Wound Width (cm) 0.5 cm   Wound Depth (cm) 0 cm   Wound Volume (cm^3) 0 cm^3   Wound Surface Area (cm^2) 0.25 cm^2   Care Cleansed with:;Sterile normal saline   Dressing Changed;Applied;Foam   Dressing Change Due 06/18/19        Wound 06/04/19 Ulceration Toe, first   Date First Assessed: 06/04/19   Pre-existing: Yes  Primary Wound Type: Ulceration  Side: Right  Location: Toe, first   Wound WDL ex   Dressing Appearance Open to air;No dressing   Drainage Amount None   Drainage Characteristics/Odor No odor   Appearance Dry   Tissue loss description Not applicable   Periwound Area Intact;Dry   Wound Edges Callused   Wound Length (cm) 0.5 cm   Wound Width (cm) 0.5 cm   Wound Depth (cm) 0 cm   Wound Volume (cm^3) 0 cm^3   Wound Surface Area (cm^2) 0.25 cm^2        Wound 06/04/19 Ulceration lateral Leg   Date First Assessed: 06/04/19   Pre-existing: Yes   Primary Wound Type: Ulceration  Side: Right  Orientation: lateral  Location: Leg   Wound WDL ex   Dressing Appearance Dry;Intact;Dried drainage   Drainage Amount Scant   Drainage Characteristics/Odor No odor   Appearance Pink;Dry   Tissue loss description Partial thickness   Periwound Area Intact;Dry   Wound Edges Open   Wound Length (cm) 0.3 cm   Wound Width (cm) 0.3 cm   Wound Depth (cm) 0 cm   Wound Volume (cm^3) 0 cm^3   Wound Surface Area (cm^2) 0.09 cm^2   Care Cleansed with:;Sterile normal saline   Dressing Changed;Applied;Foam   Dressing Change Due 06/18/19   Skin Interventions   Pressure Reduction Devices feet on footrest/footstool;foam padding utilized;positioning supports utilized  (heels offloaded on pillow)   Pressure Reduction Techniques frequent weight shift encouraged;heels elevated off bed;positioned off wounds;pressure points protected   Right heel- dried skin    Right heel medial    Left heel

## 2019-06-11 NOTE — CONSULTS
"   [facility]  Adult Nutrition  Consult Note     SUMMARY      Recommendations     1. Continue current Renal, Diabetic diet + Novasource ONS (fluid restriction per  MD).   2. RD to monitor & follow-up.     Goals: PO Intake >50%  Nutrition Goal Status: new  Communication of RD Recs: plan of care     Reason for Assessment     Reason For Assessment: length of stay  Diagnosis: other (see comments)(Bacteremia)  Relevant Medical History: ESRD on HD, HTN, CHF     General Information Comments: Pt with good appetite and intake consuming 100% of meals today. Stable wt PTA. NFPE not indicated, pt appears nourished. Pt on HD.   Nutrition Discharge Planning: Adequate PO intake     Nutrition/Diet History     Patient Reported Diet/Restrictions/Preferences: renal  Spiritual, Cultural Beliefs, Mosque Practices, Values that Affect Care: no  Factors Affecting Nutritional Intake: None identified at this time     Anthropometrics     Temp: 97.4 °F (36.3 °C)  Height Method: Stated  Height: 5' 8" (172.7 cm)  Height (inches): 68 in  Weight Method: Bed Scale  Weight: 108.2 kg (238 lb 8.6 oz)  Weight (lb): 238.54 lb  Ideal Body Weight (IBW), Female: 140 lb  % Ideal Body Weight, Female (lb): 170.39 lb  BMI (Calculated): 36.3  BMI Grade: 35 - 39.9 - obesity - grade II  Usual Body Weight (UBW), k kg  % Usual Body Weight: 99.47     Lab/Procedures/Meds     Pertinent Labs Reviewed: reviewed  Pertinent Labs Comments: Na 124, BUN 42, Creat 7, GFR 6.9, A1C 6.5, [place] 8.6  Pertinent Medications Reviewed: reviewed  Pertinent Medications Comments: Warfarin     Estimated/Assessed Needs     Weight Used For Calorie Calculations: 108.2 kg (238 lb 8.6 oz)      Energy Calorie Requirements (kcal): 2145 kcal/d   Energy Need Method: [place]-St Jeor(1.25 PAL)      Protein Requirements: 108-130 g/d (1-1.2 g/kg)  Weight Used For Protein Calculations: 108.2 kg (238 lb 8.6 oz)      Estimated Fluid Requirement Method: other (see comments)(Per " MD or 1 mL/kcal)      Nutrition Prescription Ordered     Current Diet Order: Renal, Diabetic  Nutrition Order Comments: 800 mL FR  Oral Nutrition Supplement: Novasource ONS     Evaluation of Received Nutrient/Fluid Intake     Comments: LBM: 6/9     Tolerance: tolerating     Nutrition Risk     Level of Risk/Frequency of Follow-up: (1x/week)      Assessment and Plan     No nutritional dx at this time.      Monitor and Evaluation     Food and Nutrient Intake: energy intake, food and beverage intake  Food and Nutrient Adminstration: diet order  Physical Activity and Function: nutrition-related ADLs and IADLs  Anthropometric Measurements: weight, weight change  Biochemical Data, Medical Tests and Procedures: inflammatory profile, lipid profile, glucose/endocrine profile, gastrointestinal profile, electrolyte and renal panel  Nutrition-Focused Physical Findings: overall appearance      Nutrition Follow-Up     RD Follow-up?: Yes

## 2019-06-12 NOTE — PT/OT/SLP PROGRESS
Occupational Therapy  Treatment    Sisi Medel   MRN: [id number]   Admitting Diagnosis: <principal problem not specified>    OT Date of Treatment: 06/12/19       Billable Minutes:  Therapeutic Activity 35 and Therapeutic Exercise 10    General Precautions: Standard, fall, seizure(renal diet )  Orthopedic Precautions: N/A  Braces: N/A    Spiritual, Cultural Beliefs, Zoroastrian Practices, Values that Affect Care: no    Subjective:  Communicated with nsg prior to session.  I am doing well but tired     Pain/Comfort  Pain Rating 1: 0/10  Pain Rating Post-Intervention 1: 0/10    Objective:   Pt.  Supine on arrival     Occupational Performance:    Bed Mobility:    · Patient completed Supine to Sit with contact guard assistance  · Patient completed Sit to Supine with contact guard assistance     Functional Mobility/Transfers:  · Patient completed Sit <> Stand Transfer with contact guard assistance and minimum assistance  with  rolling walker   · Patient completed Bed <> Chair Transfer using Stand Pivot technique with contact guard assistance and minimum assistance with rolling walker      Activities of Daily Living:  · Upper Body Dressing: moderate assistance to callie gown from around back     [facility] 6 Click:  [facility] Total Score: 16    OT Exercises: UE Ergometer 10 min    Additional Treatment:   Pt. With Red thera band activity with 2x20 reps with (A) with shd flex, bicep curls horz adb/add and forward flex motion to increase BUE ROM and strength,.   Pt. With standing and therex performed to increase ROM, endurance selfcare task and fxl mobility for independence     Patient left supine with all lines intact and call button in reach    ASSESSMENT:  Sisi Medel is a 58 y.o. female with a medical diagnosis of <principal problem not specified> Pt. participated well with session on this day. Pt. With complaints of mild fatigue and decrease endurance with task on this day ivis with standing tolerance.  Pt [place]  physical deficits with balance  functional mobility, UB strength, endurance  level of functional indep with daily tasks and activities and selfcare skills .Pt. Will continue to benefit from continued OT to progress towards goals      Rehab identified problem list/impairments: impaired endurance, impaired self care skills, impaired functional mobilty    Rehab potential is fair    Activity tolerance: Fair    Discharge recommendations:       Barriers to discharge: None     Equipment recommendations: (TBD as pt. progresses)     GOALS:   Multidisciplinary Problems     Occupational Therapy Goals        Problem: Occupational Therapy Goal    Goal Priority Disciplines Outcome Interventions   Occupational Therapy Goal     OT, PT/OT Ongoing (interventions implemented as appropriate)    Description:  Goals to be met by: 06-24-19     Patient will increase functional independence with ADLs by performing:    UE Dressing with Set-up Assistance.  LE Dressing with Supervision.  Grooming while standing with Supervision.  Toileting from bedside commode with Supervision for hygiene and clothing management.   Bathing from  edge of bed with Supervision.  Supine to sit with Modified [place].  Stand pivot transfers with Supervision.  Toilet transfer to bedside commode with Supervision.  Pt. To be I with HEP for BUE to improve level of endurance  Pt. To engage in dynamic standing activity x 10 minutes with S                  Plan:  Patient to be seen 5 x/week to address the above listed problems via self-care/home management, therapeutic activities, therapeutic exercises  Plan of Care expires: 07/11/19  Plan of Care reviewed with: patient  The BALTA and NICOLE have collaborated and discussed the patient's status, treatment plan and progress toward established goals.   SABINE Padilla 6/12/2019

## 2019-06-12 NOTE — PLAN OF CARE
Problem: Diabetes Comorbidity  Goal: Blood Glucose Level Within Desired Range    Intervention: Maintain Glycemic Control     06/12/19 1724   Monitor and Manage Ketoacidosis   Glycemic Management blood glucose monitoring;oral hydration promoted

## 2019-06-12 NOTE — PT/OT/SLP PROGRESS
Physical Therapy  Treatment    Sisi Medel   MRN: [id number]   Admitting Diagnosis: Acute on chronic respiratory failure w/ hypoxia    PT Received On: 06/12/19        Billable Minutes:  Gait Training 16, Therapeutic Activity 10, Therapeutic Exercise 22 and Total Time 48    Treatment Type: Treatment  PT/PTA: PT     PTA Visit Number: 0       General Precautions: Standard, fall, seizure  Orthopedic Precautions: N/A   Braces: N/A    Spiritual, Cultural Beliefs, Mormonism Practices, Values that Affect Care: no    Subjective:  Communicated with patient prior to session.  Pt agreeable to session.    Pain/Comfort  Pain Rating 1: 0/10    Objective:  Patient found sitting in w/c. Patient found with: oxygen(3lpm)     AM-PAC 6 CLICK MOBILITY  Total Score:16    Bed Mobility:  Sit>Supine:on mat w/ ModA for BLE  Supine>Sit: on mat w/ Jj for trunk  Cueing for technique    Transfers:  Sit<>Stand: to/from w/c (2 trials) w/ RW and ModA to rise  Stand Pivot Transfer: w/c<>nustep and w/c<>EOM w/ RW and Mod/Jj to rise and for stability w/ pivot  Cueing for forward weight shift    Gait:  Amb 2 trials (21ft and 15ft) w/ RW and Min/CGA for stability  Cueing for posture and for initial heel contact  Limited in distance by fatigue     Therex:  Supine and seated BLE therex 2x10 reps (GS, SAQ, AP, HF, LAQ)  Cueing for form    Additional Treatment:  Recumbent cross , Level 1-2, x8min to inc overall strength/endurance  Limited in duration by fatigue    Patient left up in chair with all lines intact and call button in reach.    Assessment:  Sisi Medel is a 58 y.o. female with a medical diagnosis of Acute on chronic respiratory failure w/ hypoxia.  Pt avinash session well w/ good participation. She was able to participate in gait training today- declined yesterday due to fatigue following HD. Pt is progressing but remains limited by the below mentioned deficits. She will continue PT POC.    Rehab identified problem  list/impairments: weakness, impaired endurance, impaired self care skills, impaired functional mobilty, gait instability, impaired balance, decreased lower extremity function    Rehab potential is good.    Activity tolerance: Good    Discharge recommendations: home with home health     Barriers to discharge: Inaccessible home environment    Equipment recommendations: tub bench     GOALS:   Multidisciplinary Problems     Physical Therapy Goals        Problem: Physical Therapy Goal    Goal Priority Disciplines Outcome Goal Variances Interventions   Physical Therapy Goal     PT, PT/OT Ongoing (interventions implemented as appropriate)     Description:  Goals to be met by: 19     Patient will increase functional independence with mobility by performin. Supine to sit with Set-up [place]  2. Sit to supine with Stand-by Assistance  3. Sit to stand transfer with Stand-by Assistance  4. Bed to chair transfer with Stand-by Assistance using Rolling Walker  5. Gait  x 100 feet with Stand-by Assistance using Rolling Walker.   6. Ascend/descend 5 stair with right OR left Handrails Contact Guard Assistance   7. Stand for 2 minutes with Stand-by Assistance using Rolling Walker while performing activity                      PLAN:    Patient to be seen 5 x/week  to address the above listed problems via gait training, therapeutic activities, therapeutic exercises  Plan of Care expires: 19  Plan of Care reviewed with: patient, significant other    Erica Mast, PT  2019

## 2019-06-12 NOTE — PLAN OF CARE
Problem: Occupational Therapy Goal  Goal: Occupational Therapy Goal  Goals to be met by: 06-24-19     Patient will increase functional independence with ADLs by performing:    UE Dressing with Set-up Assistance.  LE Dressing with Supervision.  Grooming while standing with Supervision.  Toileting from bedside commode with Supervision for hygiene and clothing management.   Bathing from  edge of bed with Supervision.  Supine to sit with Modified [place].  Stand pivot transfers with Supervision.  Toilet transfer to bedside commode with Supervision.  Pt. To be I with HEP for BUE to improve level of endurance  Pt. To engage in dynamic standing activity x 10 minutes with S     Outcome: Ongoing (interventions implemented as appropriate)  .    Comments: .

## 2019-06-12 NOTE — PLAN OF CARE
Problem: Physical Therapy Goal  Goal: Physical Therapy Goal  Goals to be met by: 19     Patient will increase functional independence with mobility by performin. Supine to sit with Set-up [place]  2. Sit to supine with Stand-by Assistance  3. Sit to stand transfer with Stand-by Assistance  4. Bed to chair transfer with Stand-by Assistance using Rolling Walker  5. Gait  x 100 feet with Stand-by Assistance using Rolling Walker.   6. Ascend/descend 5 stair with right OR left Handrails Contact Guard Assistance   7. Stand for 2 minutes with Stand-by Assistance using Rolling Walker while performing activity     Outcome: Ongoing (interventions implemented as appropriate)  LTGs remain appropriate. Pt will continue PT POC.    Erica Mast, PT  2019

## 2019-06-12 NOTE — CLINICAL REVIEW
Clinical Pharmacy Chart Review Note      Admit Date: 6/10/2019   LOS: 2 days       Sisi Medel is a 58 y.o. female admitted to SNF for PT/OT after hospitalization for acute on chronic respiratory failure with hypoxia.    Active Hospital Problems    Diagnosis  POA    Acute and chronic respiratory failure with hypoxia [J96.21]  Yes      Resolved Hospital Problems   No resolved problems to display.     Review of patient's allergies indicates:   Allergen Reactions    Bactrim [sulfamethoxazole-trimethoprim] Other (See Comments)     Causes renal failure    Nsaids (non-steroidal anti-inflammatory drug) Other (See Comments)     Renal failure     Patient Active Problem List    Diagnosis Date Noted    Acute and chronic respiratory failure with hypoxia 06/10/2019    Open wound of toe 06/04/2019    Leg wound, right 06/04/2019    Open wound of right great toe 06/04/2019    Other streptococcal sepsis 06/02/2019    Discharge planning issues 06/02/2019    Gram-negative bacteremia 06/01/2019    Wound of right leg 05/30/2019    Transaminitis 05/30/2019    Cor pulmonale 05/10/2019    Paroxysmal A-fib 05/10/2019    Anemia in ESRD (end-stage renal disease) 05/10/2019    Gastric ulcer 05/10/2019    Diabetic ulcer of both feet 05/10/2019    Chronic kidney disease-mineral and bone disorder     Clotted dialysis access 05/08/2019    Skin ulcer of toe of left foot, limited to breakdown of skin 04/10/2019    End stage renal failure on dialysis 04/05/2019    Ulcer of lower limb 04/01/2019    Hyponatremia     Lactic acidemia 03/29/2019    AV fistula thrombosis, subsequent encounter 03/29/2019    Hypocalcemia     Idiopathic hypotension 03/28/2019    Orthostatic hypotension 03/25/2019    Acute on chronic combined systolic and diastolic heart failure 03/24/2019    ESRD on hemodialysis     Healthcare-associated pneumonia 03/12/2019    Skin ulcer 03/11/2019    Folliculitis 03/08/2019    Severe obesity (BMI  35.0-35.9 with comorbidity) 03/06/2019    Arteriovenous graft stenosis 01/30/2019    Ulcer of right lower extremity with fat layer exposed 01/11/2019    Onychomycosis 01/04/2019    Elevated alkaline phosphatase level 11/02/2018    Cardiomyopathy 08/31/2018    AV graft malfunction 08/22/2018    Muscle weakness 05/14/2018    Gait instability 05/14/2018    AV fistula thrombosis 01/10/2018    Hyperkalemia 01/02/2018    Episode of transient neurologic symptoms 12/08/2017    Multinodular thyroid 12/08/2017    Diabetes mellitus, type 2 12/08/2017    Complication of arteriovenous dialysis fistula 10/15/2017    AV graft stenosis 09/13/2017    Arteriovenous fistula occlusion     Biventricular cardiac pacemaker in situ 08/04/2017    Permanent atrial fibrillation 06/12/2017    Osteopenia determined by x-ray 04/28/2017    PHN (postherpetic neuralgia) 04/07/2017    Polyneuropathy associated with underlying disease 03/29/2017    CRLD (chronic restrictive lung disease) 03/06/2017    Weakness generalized 02/17/2017    Vitamin D insufficiency 02/06/2017    Secondary adrenal insufficiency 02/04/2017    GERD (gastroesophageal reflux disease) 01/20/2017    Hypoalbuminemia 11/26/2016    Current chronic use of systemic steroids 11/18/2016    Chronic respiratory failure with hypoxia 11/17/2016    Arteriovenous fistula stenosis 10/12/2016    Chronic gout due to renal impairment without tophus 06/21/2016    Hyperlipidemia 03/07/2016    Cervical radiculopathy 07/20/2015    Leukocytosis 04/07/2015    Pulmonary hypertension 04/07/2015    Morbid obesity with BMI of 40.0-44.9, adult 08/15/2013    Chronic combined systolic and diastolic heart failure 06/14/2013    Pulmonary sarcoidosis 03/19/2013    Epilepsy 03/19/2013       Scheduled Meds:    aspirin  81 mg Oral Daily    cefTRIAXone (ROCEPHIN) IVPB  2 g Intravenous Q24H    cinacalcet  30 mg Oral Daily with breakfast    clopidogrel  75 mg Oral Daily     fludrocortisone  100 mcg Oral BID    insulin detemir U-100  2 Units Subcutaneous QHS    levETIRAcetam  500 mg Oral BID    miconazole NITRATE 2 %   Topical (Top) BID    midodrine  10 mg Oral TID    pantoprazole  40 mg Oral Daily    prednisoLONE acetate  1 drop Left Eye TID    predniSONE  10 mg Oral Daily    senna-docusate 8.6-50 mg  1 tablet Oral BID    sevelamer carbonate  1,600 mg Oral TID WM    vitamin renal formula (B-complex-vitamin c-folic acid)  1 capsule Oral Daily    warfarin  2.5 mg Oral Every Mon, Wed, Fri    [START ON 6/13/2019] warfarin  5 mg Oral Every Tues, Thurs, Sat, Sun     Continuous Infusions:   PRN Meds: acetaminophen, albuterol-ipratropium, dicyclomine, glucagon (human recombinant), glucose, glucose, insulin aspart U-100, ramelteon, sodium chloride 0.9%    OBJECTIVE:     Vital Signs (Last 24H)  Temp:  [97.5 °F (36.4 °C)-98 °F (36.7 °C)]   Pulse:  [62-71]   Resp:  [18]   BP: (111-117)/(73-75)   SpO2:  [99 %-100 %]     Laboratory:  CBC:   Recent Labs   Lab 06/08/19  0542 06/09/19  0521 06/10/19  0739   WBC 12.05 13.63* 16.97*   RBC 4.91 4.91 4.84   HGB 12.8 12.8 12.7   HCT 40.5 41.7 41.2    331 311   MCV 83 85 85   MCH 26.1* 26.1* 26.2*   MCHC 31.6* 30.7* 30.8*     BMP:   Recent Labs   Lab 06/08/19  0542 06/09/19  0521 06/10/19  0739   * 132* 55*   * 127* 124*   K 4.4 4.0 4.8   CL 87* 88* 85*   CO2 17* 23 19*   BUN 55* 29* 42*   CREATININE 7.7* 5.1* 7.0*   CALCIUM 8.1* 8.5* 8.6*     CMP:   Recent Labs   Lab 06/08/19  0542 06/09/19  0521 06/10/19  0739   * 132* 55*   CALCIUM 8.1* 8.5* 8.6*   ALBUMIN 2.9* 3.1* 3.1*   PROT 7.4 7.6 7.5   * 127* 124*   K 4.4 4.0 4.8   CO2 17* 23 19*   CL 87* 88* 85*   BUN 55* 29* 42*   CREATININE 7.7* 5.1* 7.0*   ALKPHOS 104 129 115   ALT 67* 58* 48*   AST 30 32 36   BILITOT 0.4 0.4 0.4     LFTs:   Recent Labs   Lab 06/08/19  0542 06/09/19  0521 06/10/19  0739   ALT 67* 58* 48*   AST 30 32 36   ALKPHOS 104 129 115   BILITOT  0.4 0.4 0.4   PROT 7.4 7.6 7.5   ALBUMIN 2.9* 3.1* 3.1*     Coagulation:   Recent Labs   Lab 06/09/19  0521 06/10/19  0739 06/12/19  0507   INR 3.1* 4.7* 2.8*     Lab Results   Component Value Date    HGBA1C 6.5 (H) 05/30/2019         ASSESSMENT/PLAN:     I have reviewed the medications in compliance with CMS Regulation F329 of the [place] Appendix PP. No irregularities were noted at this time.    Medications will be reviewed and monitored by Pharm. D.        Arabella Bliss Pharm. D.  Clinical Pharmacist  [facility]

## 2019-06-12 NOTE — PLAN OF CARE
Problem: Occupational Therapy Goal  Goal: Occupational Therapy Goal  Goals to be met by: 06-24-19     Patient will increase functional independence with ADLs by performing:    UE Dressing with Set-up Assistance.  LE Dressing with Supervision.  Grooming while standing with Supervision.  Toileting from bedside commode with Supervision for hygiene and clothing management.   Bathing from  edge of bed with Supervision.  Supine to sit with Modified [place].  Stand pivot transfers with Supervision.  Toilet transfer to bedside commode with Supervision.  Pt. To be I with HEP for BUE to improve level of endurance  Pt. To engage in dynamic standing activity x 10 minutes with S     Outcome: Ongoing (interventions implemented as appropriate)  .

## 2019-06-12 NOTE — PROGRESS NOTES
[facility]                                  Skilled Nursing Facility                   Progress Note     Admit Date: 6/10/2019  JACKELYN TBD   Principal Problem:  Acute and chronic respiratory failure with hypoxia   HPI obtained from patient interview and chart review     Chief Complaint: Establish Care/ Initial Visit     HPI:   Ms. Medel is a 58 year old female with PMHx of pulmonary sarcoidosis on 3-5L home oxygen, Class 3 HFrEF (3/2019 EF 35%), perm Afib/flutter s/p 6/2017 AVN ablation & PPM, ESRD (HD TTS), Type 2 DM (3/2019 A1c 6.2%), pulmonary HTN, YOANDY, and orthostatic hypotension who presents to SNF following a hospitalization for fluid overload and respiratory decompensation     Patient [place] [facility] [place] with complaints of shortness of breath on 5/30. The patient went to dialysis that day but could not get down to her dry weight.  Per nephrology, should be 111kg but patient only got to 112. Per the patient and her family these symptoms have been persistent for several weeks. She was [place] [facility] [place] prior -5/29/19 following a 2 day admission with similar complaints. At that time the patient needed dialysis for volume overload and new dry weight was established.     Of note patient has had frequent hospitalizations in the recent past. These admissions are typically due to volume overload, CHF exacerbation, and shortness of breath. She follows with pulmonology as an outpatient and is on chronic steroids. Per last outpatient note from Dr. Altamirano on 5/23/19, continued on prednisone 20mg daily for sarcoid as she has had difficulty and increased hospitalizations on lower doses.     In the ED the patient was on BiPAP, chest Xray showing significant pulmonary edema. Nephology was consulted and started dialysis in room. Her work of breathing significantly increased and she was admitted to the ICU. Blood cultures grew  Klebsiella oxytoca and Streptococcus salivarius, and she was started on IV Vancomycin and Zosyn. Vancomycin was discontinued on .  Her respiratory status improved after admission to the ICU and further dialysis, and she was stepped down to hospital medicine on . A CT Chest/abd/pelvis was  was unremarkable. Infectious disease was consulted on  and recommended two week course of ceftriaxone which will end on  19      Patient will be treated at [facility] with PT and OT to improve functional status and ability to perform ADLs.     Past Medical History: Patient has a past medical history of Anemia in ESRD (end-stage renal disease) (3/7/2016), Anticoagulant long-term use, Cervical radiculopathy (2015), CHF (congestive heart failure), Chronic combined systolic and diastolic heart failure (2013), Chronic respiratory failure with hypoxia (2013), Closed fracture of proximal end of right fibula (2016), Complications due to renal dialysis device, implant, and graft, DDD (degenerative disc disease), lumbar (2015), Dependence on renal dialysis, Diabetes mellitus type II, controlled (2017), ESRD on hemodialysis (2016), Essential hypertension, Gout, Lateral meniscal tear (2016), Lumbar stenosis (2015), Obesity, Class III, BMI 40-49.9 (morbid obesity), Pacemaker, Paroxysmal atrial fibrillation (2014), Peripheral neuropathy (2013), Personal history of gastric ulcer (3/19/2013), Pneumonia of left lower lobe due to infectious organism (3/10/2019), Sarcoidosis, lung (), Secondary pulmonary hypertension (2015), Seizure disorder (3/19/2013), Shingles (2013), and Thyroid disease.    Past Surgical History: Patient has a past surgical history that includes Abdominal surgery;  section; OTHER SURGICAL HISTORY; Vascular surgery; stent to fistula; Cardiac pacemaker placement; Fistulogram (Left, 2019); Percutaneous transluminal angioplasty of  arteriovenous fistula (N/A, 1/30/2019); Fistulogram (Left, 4/8/2019); and Fistulogram (Left, 5/8/2019).    Social History: Patient reports that she quit smoking about 25 years ago. Her smoking use included cigarettes. She has a 5.00 pack-year smoking history. She has never used smokeless tobacco. She reports that she does not drink alcohol or use drugs.    Family History: family history includes Coronary artery disease in her father; Diabetes in her father, maternal grandmother, and mother; Hypertension in her father and mother; Kidney failure in her mother; Lupus in her sister.    Allergies: Patient is allergic to bactrim [sulfamethoxazole-trimethoprim] and nsaids (non-steroidal anti-inflammatory drug).    ROS  Constitutional: Negative for fever. + fatigue.   Eyes: Negative for blurred vision, double vision and discharge.   Respiratory: +for cough, shortness of breath- close to baseline. Neg for wheezing.    Cardiovascular: Negative for chest pain, palpitations, claudication, + leg swelling.   Gastrointestinal: Negative for abdominal pain, constipation, diarrhea, nausea and vomiting. awating BM; pt says is coming today   Genitourinary: Negative for dysuria, frequency and urgency.   Musculoskeletal:  + generalized weakness, gait instability. Negative for back pain and myalgias.   Skin: Negative for itching and rash.   Neurological: Negative for dizziness, speech change, and headaches.   Psychiatric/Behavioral: Negative for depression. The patient is not nervous/anxious.      PEx  Temp:  [97.5 °F (36.4 °C)-98 °F (36.7 °C)]   Pulse:  [62-74]   Resp:  [18]   BP: (111-117)/(73-80)   SpO2:  [97 %-100 %]      Constitutional: Patient appears obese and in no distress   HENT:   Head: Normocephalic and atraumatic.   Eyes: Pupils are equal, round, and reactive to light.   Neck: Normal range of motion. Neck supple.   Cardiovascular: Normal rate, regular rhythm and normal heart sounds.    Pulmonary/Chest: Effort normal and  breath sounds are clear.  4L O2 via NC in progress  Abdominal: Soft. Bowel sounds are normal.   Musculoskeletal: Normal range of motion.   Neurological: Alert and oriented to person, place, and time.   Skin: Skin is warm and dry.  Entire body skin assess.  Moisture associated dermatitis noted to underneath breasts and underneath the abdominal fold, mild.  Ulcers to left and right foot, the information below. LUE AVF + thrill and bruit   Psychiatric: Normal mood and affect. Behavior is normal.          06/11/19 1600        Wound 06/04/19 Laceration Toe, first   Date First Assessed: 06/04/19   Pre-existing: (c)   Primary Wound Type: Laceration  Side: Left  Location: Toe, first   Wound Image    Wound WDL ex   Dressing Appearance Dry;Intact;Clean   Drainage Amount None   Drainage Characteristics/Odor No odor   Appearance Pink;Dry   Tissue loss description Not applicable   Periwound Area Intact;Dry   Wound Edges Open   Wound Length (cm) 0.5 cm   Wound Width (cm) 0.5 cm   Wound Depth (cm) 0 cm   Wound Volume (cm^3) 0 cm^3   Wound Surface Area (cm^2) 0.25 cm^2   Care Cleansed with:;Sterile normal saline   Dressing Changed;Applied;Foam   Dressing Change Due 06/18/19        Wound 06/04/19 Ulceration Toe, first   Date First Assessed: 06/04/19   Pre-existing: Yes  Primary Wound Type: Ulceration  Side: Right  Location: Toe, first   Wound WDL ex   Dressing Appearance Open to air;No dressing   Drainage Amount None   Drainage Characteristics/Odor No odor   Appearance Dry   Tissue loss description Not applicable   Periwound Area Intact;Dry   Wound Edges Callused   Wound Length (cm) 0.5 cm   Wound Width (cm) 0.5 cm   Wound Depth (cm) 0 cm   Wound Volume (cm^3) 0 cm^3   Wound Surface Area (cm^2) 0.25 cm^2        Wound 06/04/19 Ulceration lateral Leg   Date First Assessed: 06/04/19   Pre-existing: Yes  Primary Wound Type: Ulceration  Side: Right  Orientation: lateral  Location: Leg   Wound WDL ex   Dressing Appearance Dry;Intact;Dried  drainage   Drainage Amount Scant   Drainage Characteristics/Odor No odor   Appearance Pink;Dry   Tissue loss description Partial thickness   Periwound Area Intact;Dry   Wound Edges Open   Wound Length (cm) 0.3 cm   Wound Width (cm) 0.3 cm   Wound Depth (cm) 0 cm   Wound Volume (cm^3) 0 cm^3   Wound Surface Area (cm^2) 0.09 cm^2   Care Cleansed with:;Sterile normal saline   Dressing Changed;Applied;Foam   Dressing Change Due 06/18/19   Skin Interventions   Pressure Reduction Devices feet on footrest/footstool;foam padding utilized;positioning supports utilized  (heels offloaded on pillow)   Pressure Reduction Techniques frequent weight shift encouraged;heels elevated off bed;positioned off wounds;pressure points protected   Right heel- dried skin    Right heel medial    Left heel           Assessment and Plan:    Klebsiella oxytoca and Streptococcus salivarius bacteremia  - BCx positive on 5/30. Repeat BCx NGTD on 5/31  - IV ceftriaxone 2g q24hrs x2 weeks per ID - end of therapy 6/14/2019   - midline catheter in place  - no source identified, most suspicious for GI source and possible translocation in patient on chronic steroids     Debility   - Continue with PT/OT for gait training and strengthening and restoration of ADL's   - Encourage mobility, OOB in chair, and early ambulation as appropriate  - Fall precautions   - Monitor for bowel and bladder dysfunction  - Monitor for and prevent skin breakdown and pressure ulcers  - Continue DVT prophylaxis with coumadin     Transaminitis  - Suspect secondary to sepsis, has been downtrending. Biliary duct appeared normal on CT abd   - on 6/10 AST/ALT 36/48.    - 6/12 CMP ordered- Will continue to trend until normal range     Wound of 1st toe and right skin  - Wound care consulted and has now signed off   - The left great toe has a resolving wound- partial thickness skin tear- ~ 0.5 cm x 0.5 cm x 0.  Cleansed with sterile normal saline and foam dressing applied over area.    - The right shin has a partial thickness skin tear over scar tissue.  Cleansed with sterile normal saline/foam dressing in place.   - pt see Dr. Woodson for podiatry- told pt to set up apt post DC     Paroxysmal A-fib  Long-term anticoagulation- Goal INR 2-3   - INR 1.6, 2.2, 3.1, 4.7, 2.8 over last 5 days. coumadin held for last two days due to supra therapeutic levels after being given a boost in doing when INR was subtherapeutic for 4 days (6/4- 6/7)    - 6/12 initiated home dosing of warfarin 5mg on Tues, Thurs, Sat; 2.5mg Mon, Wed, Fri      ESRD on hemodialysis  - HD per nephro TuThur and Sat schedule  - HD access: LUE AVF  - Outpatient HD unit: [facility]   -Nephrologist: Patient does not know  - biweekly renal labs   - continue cinacalcet 30 mg with breakfast, sevelamer carbonate 1600 mg t.i.d. with meals     Acute on chronic combined systolic and diastolic heart failure  - Echo 3/11/19: EF 35% with diastolic dysfunction  - continue aspirin 81mg daily, Plavix 75 mg daily, atorvastatin 80mg  - not on GDMT; BP too low chronically for ACE, BB, etc      Diabetes mellitus, type 2  -  Takes tradjenta 5mg daily at home. On 2units detemir here  - last A1c from 5/30 was 6.5     Secondary adrenal insufficiency  - continue fludrocortisone 100 mcg b.i.d., midodrine 10 mg t.i.d.    Epilepsy  - continue keppra 500mg bid     Pulmonary sarcoidosis  - On 4L O2 chronically  - 6/12 increased prednisone to 20mg daily, per Dr. Altamirano's note and pt states she takes 20mg at home.  - will need outpatient follow up with pulmonology     Apt post DC:  Pulmonary, Podiatry, Nephrology    Future Appointments   Date Time Provider Department Center   6/14/2019  8:00 AM Emeka Lester MD [facility] NEURO [place] [facility]   7/1/2019  9:00 AM Annette Hernandez NP [facility] ENDODIA [place] [facility]   7/2/2019  8:00 AM HOME MONITOR DEVICE CHECK, [facility] ARRHPRO [facility]   7/5/2019  9:00 AM Carlos A Caputo MD [facility] [address] PCW   10/1/2019  8:00 AM HOME  MONITOR DEVICE CHECK, [facility] AIDEN [place]         I certify that SNF services are required to be given on an inpatient basis because Sisi Medel needs for skilled nursing care and/or skilled rehabilitation are required on a daily basis and such services can only practically be provided in a skilled nursing facility setting and are for an ongoing condition for which she received inpatient care in the hospital.     69 minutes spent in the care of the patient (Greater than 1/2 spent in non direct face-to-face contact)    38 of 69 minutes spent on documentation and counseling patient on clinical conditions and therapies provided regarding bacteremia treatment, long-term steroid therapy, anticoagulant medication adjustments,  nephrology and Pulmonary follow-ups, discussed podiatry referral.  The remainder of the time was spent in direct patient care.     Ivette Sebastian NP      DOS: 6/12/2019

## 2019-06-13 NOTE — PT/OT/SLP PROGRESS
Physical Therapy      Patient Name:  Sisi Medel   MRN:  [id number]    Patient not seen today secondary to refusal. Pt declined PT session due to fatigue from HD. Pt also stating she does not want to be seen for therapy on HD dialysis date. PT will change her plan of care to M/W/F/Sun.  . Will follow-up per PT POC.    Erica Mast, PT

## 2019-06-13 NOTE — PT/OT/SLP PROGRESS
Occupational Therapy      Patient Name:  Sisi Medel   MRN:  [id number]    Patient not seen today secondary to  Too fatigued from HD and does not want therapy on HD days.  HD is T, TH, Sat  BALTA Kitchen  6/13/2019

## 2019-06-13 NOTE — PLAN OF CARE
Problem: Occupational Therapy Goal  Goal: Occupational Therapy Goal  Goals to be met by: 06-24-19     Patient will increase functional independence with ADLs by performing:    UE Dressing with Set-up Assistance.  LE Dressing with Supervision.  Grooming while standing with Supervision.  Toileting from bedside commode with Supervision for hygiene and clothing management.   Bathing from  edge of bed with Supervision.  Supine to sit with Modified [place].  Stand pivot transfers with Supervision.  Toilet transfer to bedside commode with Supervision.  Pt. To be I with HEP for BUE to improve level of endurance  Pt. To engage in dynamic standing activity x 10 minutes with S     Pt. POC changed to 4x/week non HD days.

## 2019-06-13 NOTE — PROGRESS NOTES
[facility]                                  Skilled Nursing Facility                   Progress Note     Admit Date: 6/10/2019  JACKELYN TBD   Principal Problem:  Acute and chronic respiratory failure with hypoxia   HPI obtained from patient interview and chart review     Chief Complaint:  Leukocytosis, Revaluation of medical treatment and therapy status: Lab review    HPI:   Ms. Medel is a 58 year old female with PMHx of pulmonary sarcoidosis on 3-5L home oxygen, Class 3 HFrEF (3/2019 EF 35%), perm Afib/flutter s/p 6/2017 AVN ablation & PPM, ESRD (HD TTS), Type 2 DM (3/2019 A1c 6.2%), pulmonary HTN, YOANDY, and orthostatic hypotension who presents to SNF following a hospitalization for fluid overload with respiratory decompensation and bacteremia.    Interval history: All labs reviewed.  Sodium remains low at 126, but improved from 124, potassium 4.0, chloride low at 85, BUN/creatinine in 35/61, AST/ALT 32/35.  WBCs increased from 16.97 to 17.88; likely due to long-term steroid use.  H&H decreased to 11.3/36 from 12.7/41.  Platelets stable at to 268.  INR stable at 2.8.  Patient progessing with PT/OT. Patient medically complex. Continuing to follow and treat all acute and chronic conditions.    Past Medical History: Patient has a past medical history of Anemia in ESRD (end-stage renal disease) (3/7/2016), Anticoagulant long-term use, Cervical radiculopathy (7/20/2015), CHF (congestive heart failure), Chronic combined systolic and diastolic heart failure (6/14/2013), Chronic respiratory failure with hypoxia (04/22/2013), Closed fracture of proximal end of right fibula (6/27/2016), Complications due to renal dialysis device, implant, and graft, DDD (degenerative disc disease), lumbar (6/17/2015), Dependence on renal dialysis, Diabetes mellitus type II, controlled (12/8/2017), ESRD on hemodialysis (2/23/2016), Essential hypertension, Gout, Lateral  meniscal tear (2016), Lumbar stenosis (2015), Obesity, Class III, BMI 40-49.9 (morbid obesity), Pacemaker, Paroxysmal atrial fibrillation (2014), Peripheral neuropathy (2013), Personal history of gastric ulcer (3/19/2013), Pneumonia of left lower lobe due to infectious organism (3/10/2019), Sarcoidosis, lung (), Secondary pulmonary hypertension (2015), Seizure disorder (3/19/2013), Shingles (2013), and Thyroid disease.    Past Surgical History: Patient has a past surgical history that includes Abdominal surgery;  section; OTHER SURGICAL HISTORY; Vascular surgery; stent to fistula; Cardiac pacemaker placement; Fistulogram (Left, 2019); Percutaneous transluminal angioplasty of arteriovenous fistula (N/A, 2019); Fistulogram (Left, 2019); and Fistulogram (Left, 2019).    Social History: Patient reports that she quit smoking about 25 years ago. Her smoking use included cigarettes. She has a 5.00 pack-year smoking history. She has never used smokeless tobacco. She reports that she does not drink alcohol or use drugs.    Family History: family history includes Coronary artery disease in her father; Diabetes in her father, maternal grandmother, and mother; Hypertension in her father and mother; Kidney failure in her mother; Lupus in her sister.    Allergies: Patient is allergic to bactrim [sulfamethoxazole-trimethoprim] and nsaids (non-steroidal anti-inflammatory drug).    ROS  Constitutional: Negative for fever. + fatigue.   Eyes: Negative for blurred vision, double vision and discharge.   Respiratory: +for cough, shortness of breath- close to baseline. Neg for wheezing.    Cardiovascular: Negative for chest pain, palpitations, claudication, + leg swelling.   Gastrointestinal: Negative for abdominal pain, constipation, diarrhea, nausea and vomiting. awating BM; pt says is coming today   Genitourinary: Negative for dysuria, frequency and urgency.    Musculoskeletal:  + generalized weakness, gait instability. Negative for back pain and myalgias.   Skin: Negative for itching and rash.   Neurological: Negative for dizziness, speech change, and headaches.   Psychiatric/Behavioral: Negative for depression. The patient is not nervous/anxious.      PEx  Pulse:  [70]   SpO2:  [100 %]      Constitutional: Patient appears obese and in no distress   HENT:   Head: Normocephalic and atraumatic.   Eyes: Pupils are equal, round, and reactive to light.   Neck: Normal range of motion. Neck supple.   Cardiovascular: Normal rate, regular rhythm and normal heart sounds.    Pulmonary/Chest: Effort normal and breath sounds are clear.  4L O2 via NC in progress  Abdominal: Soft. Bowel sounds are normal.   Musculoskeletal: Normal range of motion.   Neurological: Alert and oriented to person, place, and time.   Skin: Skin is warm and dry.  Entire body skin assess.  Moisture associated dermatitis noted to underneath breasts and underneath the abdominal fold, mild.  Ulcers to left and right foot, the information below. LUE AVF + thrill and bruit   Psychiatric: Normal mood and affect. Behavior is normal.          06/11/19 1600        Wound 06/04/19 Laceration Toe, first   Date First Assessed: 06/04/19   Pre-existing: (c)   Primary Wound Type: Laceration  Side: Left  Location: Toe, first   Wound Image    Wound WDL ex   Dressing Appearance Dry;Intact;Clean   Drainage Amount None   Drainage Characteristics/Odor No odor   Appearance Pink;Dry   Tissue loss description Not applicable   Periwound Area Intact;Dry   Wound Edges Open   Wound Length (cm) 0.5 cm   Wound Width (cm) 0.5 cm   Wound Depth (cm) 0 cm   Wound Volume (cm^3) 0 cm^3   Wound Surface Area (cm^2) 0.25 cm^2   Care Cleansed with:;Sterile normal saline   Dressing Changed;Applied;Foam   Dressing Change Due 06/18/19        Wound 06/04/19 Ulceration Toe, first   Date First Assessed: 06/04/19   Pre-existing: Yes  Primary Wound Type:  Ulceration  Side: Right  Location: Toe, first   Wound WDL ex   Dressing Appearance Open to air;No dressing   Drainage Amount None   Drainage Characteristics/Odor No odor   Appearance Dry   Tissue loss description Not applicable   Periwound Area Intact;Dry   Wound Edges Callused   Wound Length (cm) 0.5 cm   Wound Width (cm) 0.5 cm   Wound Depth (cm) 0 cm   Wound Volume (cm^3) 0 cm^3   Wound Surface Area (cm^2) 0.25 cm^2        Wound 06/04/19 Ulceration lateral Leg   Date First Assessed: 06/04/19   Pre-existing: Yes  Primary Wound Type: Ulceration  Side: Right  Orientation: lateral  Location: Leg   Wound WDL ex   Dressing Appearance Dry;Intact;Dried drainage   Drainage Amount Scant   Drainage Characteristics/Odor No odor   Appearance Pink;Dry   Tissue loss description Partial thickness   Periwound Area Intact;Dry   Wound Edges Open   Wound Length (cm) 0.3 cm   Wound Width (cm) 0.3 cm   Wound Depth (cm) 0 cm   Wound Volume (cm^3) 0 cm^3   Wound Surface Area (cm^2) 0.09 cm^2   Care Cleansed with:;Sterile normal saline   Dressing Changed;Applied;Foam   Dressing Change Due 06/18/19   Skin Interventions   Pressure Reduction Devices feet on footrest/footstool;foam padding utilized;positioning supports utilized  (heels offloaded on pillow)   Pressure Reduction Techniques frequent weight shift encouraged;heels elevated off bed;positioned off wounds;pressure points protected   Right heel- dried skin    Right heel medial    Left heel           Assessment and Plan:    Leukocytosis  - 6/13 patient has had a steadily rising leukocytosis since 6/4.  Today WBCs 17.88 from 16.97.  This is likely due to long-term steroid use.  Patient was on the wrong dose of prednisone during [facility] admission.  Prednisone increased to 20 mg daily yesterday.  Will continue to trend WBC biweekly.  Patient remains afebrile    CONTINUE    Klebsiella oxytoca and Streptococcus salivarius bacteremia  - BCx positive on 5/30. Repeat BCx NGTD on 5/31  - IV  ceftriaxone 2g q24hrs x2 weeks per ID - end of therapy 6/14/2019   - midline catheter in place  - no source identified, most suspicious for GI source and possible translocation in patient on chronic steroids     Debility   - Continue with PT/OT for gait training and strengthening and restoration of ADL's   - Encourage mobility, OOB in chair, and early ambulation as appropriate  - Fall precautions   - Monitor for bowel and bladder dysfunction  - Monitor for and prevent skin breakdown and pressure ulcers  - Continue DVT prophylaxis with coumadin     Transaminitis  - Suspect secondary to sepsis, has been downtrending. Biliary duct appeared normal on CT abd   - on 6/10 AST/ALT 36/48.    - 6/12 CMP ordered- Will continue to trend until normal range     Wound of 1st toe and right skin  - Wound care consulted and has now signed off   - The left great toe has a resolving wound- partial thickness skin tear- ~ 0.5 cm x 0.5 cm x 0.  Cleansed with sterile normal saline and foam dressing applied over area.   - The right shin has a partial thickness skin tear over scar tissue.  Cleansed with sterile normal saline/foam dressing in place.   - pt see Dr. Woodson for podiatry- told pt to set up apt post DC     Paroxysmal A-fib  Long-term anticoagulation- Goal INR 2-3   - INR 1.6, 2.2, 3.1, 4.7, 2.8 over last 5 days. coumadin held for last two days due to supra therapeutic levels after being given a boost in doing when INR was subtherapeutic for 4 days (6/4- 6/7)    - 6/12 initiated home dosing of warfarin 5mg on Tues, Thurs, Sat; 2.5mg Mon, Wed, Fri      ESRD on hemodialysis  - HD per nephro TuThur and Sat schedule  - HD access: LUE AVF  - Outpatient HD unit: [facility]   -Nephrologist: Patient does not know  - biweekly renal labs   - continue cinacalcet 30 mg with breakfast, sevelamer carbonate 1600 mg t.i.d. with meals     Acute on chronic combined systolic and diastolic heart failure  - Echo 3/11/19: EF 35% with diastolic  dysfunction  - continue aspirin 81mg daily, Plavix 75 mg daily, atorvastatin 80mg  - not on GDMT; BP too low chronically for ACE, BB, etc      Diabetes mellitus, type 2  -  Takes tradjenta 5mg daily at home. On 2units detemir here  - last A1c from 5/30 was 6.5     Secondary adrenal insufficiency  - continue fludrocortisone 100 mcg b.i.d., midodrine 10 mg t.i.d.    Epilepsy  - continue keppra 500mg bid     Pulmonary sarcoidosis  - On 4L O2 chronically  - 6/12 increased prednisone to 20mg daily, per Dr. Altamirano's note and pt states she takes 20mg at home.  - will need outpatient follow up with pulmonology     Apt post DC:  Pulmonary, Podiatry, Nephrology    Future Appointments   Date Time Provider Department Center   6/14/2019  8:00 AM Emeka Lester MD [facility] NEURO [place] [facility]   7/1/2019  9:00 AM Annette Hernandez NP [facility] ENDODIA [place] [facility]   7/2/2019  8:00 AM HOME MONITOR DEVICE CHECK, [facility] [place] [facility]   7/5/2019  9:00 AM Carlos A Caputo MD [facility] IM [facility] [facility]   10/1/2019  8:00 AM HOME MONITOR DEVICE CHECK, [facility] [place] [facility] Sebastian NP      DOS: 6/13/2019

## 2019-06-13 NOTE — PROGRESS NOTES
Pt. returned  from dialysis per [facility] ambulance via w/c.pt. voices no complaints at present time.will continue to monitor.

## 2019-06-14 PROBLEM — E44.0 MALNUTRITION OF MODERATE DEGREE: Status: ACTIVE | Noted: 2019-01-01

## 2019-06-14 PROBLEM — G40.009 PARTIAL IDIOPATHIC EPILEPSY WITH SEIZURES OF LOCALIZED ONSET, NOT INTRACTABLE, WITHOUT STATUS EPILEPTICUS: Status: ACTIVE | Noted: 2019-01-01

## 2019-06-14 NOTE — PROGRESS NOTES
[facility] - NEUROLOGY  [facility] [place]    Date: June 14, 2019   Patient Name: Sisi Medel   MRN: [id number]   PCP: Carlos A Caputo  Referring Provider: Self, Aaareferral    Assessment:      This is Sisi Medel, 58 y.o. female with complex medical problems including AF on coumadin, pulmonary sarcoid, ESRD on HD with orthostasis and recurrent syncope around dialysis who presents for follow up for her epilepsy.  She has symptoms of painful polyneuropathy noted on EMG 2014, recent A1C elevated most likely history of glucose intolerance from long term steroid use verus sarcoid versus renal disease.      Starting late 2019 she developed worsening painless proximal low extremity weakness with falls concerning for sarcoid v steroid myopathy.     Plan:      -  EMG, will need to hold coumadin  -  Continue PBG 100mg tid  -  Compound cream  -  Continue LEV     Follow up 3-4 months     2014 EPILEPSY QUALITY MEASUREMENT (AAN)  1 a. Seizure Frequency: as noted  1b. Seizure Intervention: as noted  2.  a. Etiology: as noted  b. Seizure Type: as noted  c. Epilepsy Syndrome: n/a  3.  a. Side-effects of AED: as noted   b. Intervention for side-effects: as noted  4. Screening for psychiatric or behavioral disorders: as noted  5. Personalized epilepsy safety issues and education: yes  6. Counseling for women of child-bearing potential and epilepsy: yes   7. Referral of treatment-resistant epilepsy to comprehensive epilepsy center (q 2 years): N/A.    The patient was asked to call me/the clinic 1 week after the test(s) are done to obtain results.    The following issues were  all discussed in detail with the patient and family/caregiver(s):    1. Diagnosis, plans, prognosis, medications and possible side-effects, risks and benefits of treatment, other alternatives to AEDs.  2. Risks related to continued seizures, status epilepticus, SUDEP, driving restrictions and seizure  precautions ( no baths but showers are ok, no swimming unsupervised, no use of heavy machinery, no use of sharp moving objects, avoid heights).   3. Issues related to pregnancy, OCP and breast feeding as it relates to epilepsy.  4. The potential of teratogenicity and suicidal risks of anti-epileptic medications.  5.Avoid any activity that compromise patient safety related to seizures.     Questions and concerns raised by the patient and family/care-giver(s) were addressed and they indicated understanding of everything discussed and agreed to plans as above.     I discussed side effects of the medications. I asked the patient to stop the medication if she notices serious adverse effects as we discussed and to seek immediate medical attention at an ER.     Emeka Lester MD  [facility]   Department of Neurology    Subjective:   Presents from SNF following hospitalization for increased sob and septicemia, did not get EMG as was admitted to the hospital  Improved pain with PBG increase  Ambulates only a few yards with support    2/2019  Some benefit of PGB>GBP noted, has not been able to obtain topical; has developed low extremity weakness since last visit, no back pain  11/2018  No benefit of pamelor and noted discoloration of feet, continues GBP 600mg bid, prior benefit of PGB  8/2018  No improvement with increased GBP  5/2018  Seizures remain controlled, main concern is neuropathic pain in feet  Developed painful wounds on feet 4/2018 and was told to stop doxepin cream with some healing since - currently reports questionable benefit from doxepin to begin with  Never started pamelor and currently taking GBP 300mg bid    12/2017  Improvement with compound cream, feels she is more mobile but notes it dries skin; did not start pamelor; planning to visit son in [place] for [place]    HPI 9/2017:   Ms. Sisi Medel is a 58 y.o. female who presents for follow up for epilepsy  Her seizures remain  controlled and her main concern today is painful paresthesias in distal low extremities which started on right where she had shingles.  No relief from GBP, lidocaine/capsaicin cream.    PAST MEDICAL HISTORY:  Past Medical History:   Diagnosis Date    Anemia in ESRD (end-stage renal disease) 3/7/2016    Anticoagulant long-term use     Cervical radiculopathy 7/20/2015    CHF (congestive heart failure)     Chronic combined systolic and diastolic heart failure 6/14/2013    EF 20% 2013, improved with Medical therapy, negative PET 2013    Chronic respiratory failure with hypoxia 04/22/2013    On home oxygen at 2-5 liters    Closed fracture of proximal end of right fibula 6/27/2016    Complications due to renal dialysis device, implant, and graft     DDD (degenerative disc disease), lumbar 6/17/2015    Dependence on renal dialysis     Diabetes mellitus type II, controlled 12/8/2017    ESRD on hemodialysis 2/23/2016    Essential hypertension     Gout     Lateral meniscal tear 5/31/2016    Lumbar stenosis 6/17/2015    Obesity, Class III, BMI 40-49.9 (morbid obesity)     Pacemaker     Paroxysmal atrial fibrillation 5/16/2014    Not on anticoagulation    Peripheral neuropathy 11/13/2013    Personal history of gastric ulcer 3/19/2013    Pneumonia of left lower lobe due to infectious organism 3/10/2019    Sarcoidosis, lung 2009    Diagnosed in 2009 with ocular manifestation, on 4L home O2 and PO steroids    Secondary pulmonary hypertension 4/7/2015    Seizure disorder 3/19/2013    Shingles 11/13/2013    Thyroid disease        PAST SURGICAL HISTORY:  Past Surgical History:   Procedure Laterality Date    ABDOMINAL SURGERY      ABLATION N/A 6/12/2017    Performed by Bladimir Adorno MD at [facility] CATH LAB    ANGIOPLASTY Left 1/10/2018    Performed by Torrey Villafana MD at [facility] OR 2ND FLR    ANGIOPLASTY-PERCUTANEOUS TRANSLUMINAL (PTA) Left 2/7/2017    Performed by Torrey iVllafana MD at [facility] OR  2ND FLR    ANGIOPLASTY-PERCUTANEOUS TRANSLUMINAL (PTA) Left 10/12/2016    Performed by [facility] at [facility] OR 2ND FLR    CARDIAC PACEMAKER PLACEMENT       SECTION      x2    DECLOT GRAFT PERCUTANEOUS Left 2017    Performed by [facility]rey [facility] MD at [facility] OR 2ND FLR    DECLOT-GRAFT Left 2016    Performed by Harjit Starr MD at [facility] CATH LAB    DECLOT-GRAFT Left 2016    Performed by [facility]it Starr MD at [facility] CATH LAB    ECHOCARDIOGRAM-TRANSESOPHAGEAL N/A 2013    Performed by Delmy Surgeon at [facility] DELMY    Fistulogram Left 2019    Performed by [facility] MD at [facility] CATH LAB    fistulogram Left 1/10/2018    Performed by [facility]ana MD at [facility] OR 2ND FLR    FISTULOGRAM Left 2017    Performed by [facility] MD at [facility] CATH LAB    FISTULOGRAM Left 10/12/2016    Performed by Tor[facility]ana MD at [facility] OR 2ND FLR    FISTULOGRAM Left 2016    Performed by Harjit Starr MD at [facility] CATH LAB    Fistulogram, LUE AVG, possible intervention Left 2019    Performed by Torrey Villafana MD at [facility] CATH LAB    Fistulogram, OR 11 Left 2019    Performed by Torrey Villafana MD at [facility] OR 2ND FLR    INJECTION-STEROID-EPIDURAL-TRANSFORAMINAL Right 2015    Performed by [facility]rez [facility] at [facility] PAIN MGT    INJECTION-STEROID-EPIDURAL-TRANSFORAMINAL Right 2015    Performed by [facility] at [facility] PAIN MGT    YGZYGZISV-RRNCH-[id number] / Left upper AV graft. Left 2/3/2016    Performed by [facility]ana MD at [facility] OR 2ND FLR    FIJFALWOS-RVYGXOCHO-ZLSBMZCYGUTFJ N/A 2017    Performed by Bladimir Adorno MD at [facility] CATH LAB    OTHER SURGICAL HISTORY      loop recorder implant    PLACEMENT-STENT Left 2017    Performed by [facility]rey [facility]ana MD at [facility] OR 2ND FLR    PTA, AV FISTULA N/A 2019    Performed by [facility] MD at [facility] CATH LAB    stent to fistula       TRANSESOPHAGEAL ECHOCARDIOGRAM (JARAD) N/A 6/27/2016    Performed by [facility]rav Machuca [facility] at [facility] CATH LAB    ULTRASOUND, UPPER GI TRACT, ENDOSCOPIC N/A 1/9/2014    Performed by Stewart Burgess MD at [facility] ENDO (2ND FLR)    VASCULAR SURGERY      fistula to L upper arm        CURRENT MEDS:  No current facility-administered medications for this visit.      No current outpatient medications on file.     Facility-Administered Medications Ordered in Other Visits   Medication Dose Route Frequency Provider Last Rate Last Dose    acetaminophen tablet 650 mg  650 mg Oral Q4H PRN Ivy Vaughn, DO   650 mg at 06/12/19 1827    albuterol-ipratropium 2.5 mg-0.5 mg/3 mL nebulizer solution 3 mL  3 mL Nebulization Q4H PRN Ivy Vaughn, DO        aspirin EC tablet 81 mg  81 mg Oral Daily Ivy Vaughn, DO   81 mg at 06/13/19 1338    cefTRIAXone (ROCEPHIN) 2 g in dextrose 5 % 50 mL IVPB  2 g Intravenous Q24H Ivy Vaughn, DO   2 g at 06/13/19 1800    cinacalcet tablet 30 mg  30 mg Oral Daily with breakfast Ivy Vaughn, DO   30 mg at 06/12/19 0852    clopidogrel tablet 75 mg  75 mg Oral Daily Ivy Vaughn, DO   75 mg at 06/13/19 1339    dicyclomine tablet 20 mg  20 mg Oral Q8H PRN Ivy Vaughn, DO        fludrocortisone tablet 100 mcg  100 mcg Oral BID Ivy Vaughn, DO   100 mcg at 06/13/19 2106    glucagon (human recombinant) injection 1 mg  1 mg Intramuscular PRN Ivy Vaughn, DO        glucose chewable tablet 16 g  16 g Oral PRN Ivy Vaughn, DO        glucose chewable tablet 24 g  24 g Oral PRN Ivy Vaughn, DO        insulin aspart U-100 pen 0-5 Units  0-5 Units Subcutaneous QID (AC + HS) PRN Ivy Vaughn, DO   3 Units at 06/13/19 1702    insulin detemir U-100 pen 2 Units  2 Units Subcutaneous QHS Ivy Vaughn, DO   2 Units at 06/13/19 2105    levETIRAcetam tablet 500 mg  500 mg Oral BID  Ivy Vaughn, DO   500 mg at 06/13/19 2106    miconazole NITRATE 2 % top powder   Topical (Top) BID Ivy Vaughn DO        midodrine tablet 10 mg  10 mg Oral TID Ivy Vaughn, DO   10 mg at 06/13/19 2106    pantoprazole EC tablet 40 mg  40 mg Oral Daily Ivy Vaughn, DO   40 mg at 06/13/19 1339    prednisoLONE acetate 1 % ophthalmic suspension 1 drop  1 drop Left Eye TID Ivy Vaughn DO   1 drop at 06/13/19 2108    predniSONE tablet 20 mg  20 mg Oral Daily Ivette Sebastian, NP   20 mg at 06/13/19 1339    ramelteon tablet 8 mg  8 mg Oral Nightly PRN Ivy Vaughn DO   8 mg at 06/13/19 2106    senna-docusate 8.6-50 mg per tablet 1 tablet  1 tablet Oral BID Ivy Vaughn, DO   1 tablet at 06/12/19 2048    sevelamer carbonate tablet 1,600 mg  1,600 mg Oral TID WM Ivy Vaughn, DO   1,600 mg at 06/13/19 1702    sodium chloride 0.9% flush 10 mL  10 mL Intravenous PRN Ivy aVughn DO   10 mL at 06/13/19 1802    vitamin renal formula (B-complex-vitamin c-folic acid) 1 mg per capsule 1 capsule  1 capsule Oral Daily Ivy Vaughn, DO   1 capsule at 06/13/19 1339    warfarin (COUMADIN) tablet 2.5 mg  2.5 mg Oral Every Mon, Wed, Fri Ivette Sebastian NP   2.5 mg at 06/12/19 1719    warfarin (COUMADIN) tablet 5 mg  5 mg Oral Every Tues, Thurs, Sat, Sun Ivette Sebastian, NP   5 mg at 06/13/19 1702       ALLERGIES:  Review of patient's allergies indicates:   Allergen Reactions    Bactrim [sulfamethoxazole-trimethoprim] Other (See Comments)     Causes renal failure    Nsaids (non-steroidal anti-inflammatory drug) Other (See Comments)     Renal failure       FAMILY HISTORY:  Family History   Problem Relation Age of Onset    Kidney failure Mother     Hypertension Mother     Diabetes Mother     Coronary artery disease Father     Hypertension Father     Diabetes Father     Lupus Sister     Diabetes Maternal  Grandmother     Colon cancer Neg Hx     Ovarian cancer Neg Hx     Breast cancer Neg Hx        SOCIAL HISTORY:  Social History     Tobacco Use    Smoking status: Former Smoker     Packs/day: 0.50     Years: 10.00     Pack years: 5.00     Types: Cigarettes     Last attempt to quit: 1994     Years since quittin.1    Smokeless tobacco: Never Used   Substance Use Topics    Alcohol use: No     Alcohol/week: 0.0 oz     Comment: rarely    Drug use: No       Review of Systems:  12 review of systems is negative except for the symptoms mentioned in HPI.        Objective:     Vitals:    19 0740   BP: 96/66   Pulse: 70       General: mild distress, well nourished   Eyes: no tearing, discharge, no erythema   ENT: moist mucous membranes of the oral cavity, nares patent    Neck: Supple, full range of motion  Cardiovascular: Warm and well perfused, pulses equal and symmetrical  Lungs: on O2 with increased work of breathing  Psychiatry: Mood and affect are appropriate   Abdomen: soft, non tender, non distended      Neurological   MENTAL STATUS: Alert and oriented to person, place, and time. Attention and concentration within normal limits. Speech without dysarthria, able to name and repeat without difficulty. Recent and remote memory within normal limits   CRANIAL NERVES: Visual fields intact. PERRL. EOMI. Facial sensation intact. Face symmetrical. Hearing grossly intact. Full shoulder shrug bilaterally. Tongue protrudes midline   SENSORY: Sensation is decreased to light touch in the feet.  Decreased vibration past the ankle  MOTOR:  Low extremities 4-/5 proximal and 5/5 distal  CEREBELLAR/COORDINATION/GAIT: Heel to shin intact. Finger to nose intact. Normal rapid alternating movements.  Unable to stand without support

## 2019-06-14 NOTE — PLAN OF CARE
Problem: Occupational Therapy Goal  Goal: Occupational Therapy Goal  Goals to be met by: 06-24-19      Therapy to be on MWF and SUN (4x/week) addended 06-13-`19    Patient will increase functional independence with ADLs by performing:    UE Dressing with Set-up Assistance.  LE Dressing with Supervision.  Grooming while standing with Supervision.  Toileting from bedside commode with Supervision for hygiene and clothing management.   Bathing from  edge of bed with Supervision.  Supine to sit with Modified [place].  Stand pivot transfers with Supervision.  Toilet transfer to bedside commode with Supervision.  Pt. To be I with HEP for BUE to improve level of endurance  Pt. To engage in dynamic standing activity x 10 minutes with S      Outcome: Ongoing (interventions implemented as appropriate)  Patient goals are appropriate.

## 2019-06-14 NOTE — PT/OT/SLP PROGRESS
Occupational Therapy  Treatment    Sisi Medel   MRN: [id number]   Admitting Diagnosis: Acute and chronic respiratory failure with hypoxia    OT Date of Treatment: 06/14/19       Billable Minutes:  Therapeutic Activity 20 and Therapeutic Exercise 25    General Precautions: Standard, fall, seizure(renal diet )  Orthopedic Precautions: N/A  Braces: N/A    Spiritual, Cultural Beliefs, Buddhist Practices, Values that Affect Care: no    Subjective:  Communicated with patient prior to session.    Pain/Comfort  Pain Rating 1: 0/10  Pain Rating Post-Intervention 1: 0/10    Objective:   Patient found supine with head of bed flat.     Occupational Performance:    Bed Mobility:    · Patient completed Supine to Sit with head of bed flat with modified independence using hand rails    Functional Mobility/Transfers:  · Patient completed Sit <> Stand Transfer with minimum assistance of 2 people with  rolling walker   · Patient completed Bed <> wheelchair Transfer using Stand Pivot technique with minimum assistance of 2 people with no assistive device    [facility] 6 Click:  [facility] Total Score: 16    OT Exercises: UE Ergometer 15 minutes to increase endurance needed for ADLs.    Additional Treatment:  Patient stood at table top level ~6 minutes with SBA/CGA with a seated rest break after standing while matching cards reaching, crossing midline, and visual scanning in preparation of self care tasks. Patient also participated in balloon tap seated in w/c with 1.5 lb weights on wrist x 30 reps to increase strength and endurance needed for ADLs.    Patient left seated EOB with call button in reach.    ASSESSMENT:  Sisi Medel is a 58 y.o. female with a medical diagnosis of Acute and chronic respiratory failure with hypoxia and presents with the deficits listed below. Patient tolerated session well, but quickly became SOB. Requires verbal cues for correct hand placement with t/f. Patient will benefit from continued OT  services to increase independence with ADLs.     Rehab identified problem list/impairments: impaired endurance, impaired self care skills, impaired functional mobilty    Rehab potential is good    Activity tolerance: Good    Discharge recommendations:       Barriers to discharge: None     Equipment recommendations: (TBD as pt. progresses)     GOALS:   Multidisciplinary Problems     Occupational Therapy Goals        Problem: Occupational Therapy Goal    Goal Priority Disciplines Outcome Interventions   Occupational Therapy Goal     OT, PT/OT Ongoing (interventions implemented as appropriate)    Description:  Goals to be met by: 06-24-19      Therapy to be on MWF and SUN (4x/week) addended 06-13-`19    Patient will increase functional independence with ADLs by performing:    UE Dressing with Set-up Assistance.  LE Dressing with Supervision.  Grooming while standing with Supervision.  Toileting from bedside commode with Supervision for hygiene and clothing management.   Bathing from  edge of bed with Supervision.  Supine to sit with Modified [place].  Stand pivot transfers with Supervision.  Toilet transfer to bedside commode with Supervision.  Pt. To be I with HEP for BUE to improve level of endurance  Pt. To engage in dynamic standing activity x 10 minutes with S                       Plan:  Patient to be seen 4 x/week(M, W, F and Sun) to address the above listed problems via self-care/home management, therapeutic activities, therapeutic exercises  Plan of Care expires: 07/11/19  Plan of Care reviewed with: patient   The BALTA and NICOLE have collaborated and discussed the patient's status, treatment plan and progress toward established goals.   LA NENA Muse 6/14/2019     LA NENA Muse  06/14/2019

## 2019-06-14 NOTE — CONSULTS
"1. Continue current Renal, Diabetic diet + Novasource ONS (fluid restriction per  MD).   2. RD to monitor & follow-up.     Goals: PO Intake >50%  Nutrition Goal Status: new  Communication of RD Recs: plan of care  Reason for Assessment     Reason For Assessment:R/O malnutrition  Diagnosis: other (see comments)(Bacteremia)  Relevant Medical History: ESRD on HD, HTN, CHF     General Information Comments: Pt with good appetite and intake consuming 100% of meals today. Stable wt PTA. NFPE not indicated, pt appears nourished. Pt on HD.   Nutrition Discharge Planning: Renal diabetic diet, fluid restriciton    Nutrition/Diet History     Patient Reported Diet/Restrictions/Preferences: renal  Spiritual, Cultural Beliefs, Religion Practices, Values that Affect Care: no  Factors Affecting Nutritional Intake: None identified at this time     Anthropometrics     Temp: 97.4 °F (36.3 °C)  Height Method: Stated  Height: 5' 8" (172.7 cm)  Height (inches): 68 in  Weight Method: Bed Scale  Weight: 108.2 kg (238 lb 8.6 oz)  Weight (lb): 238.54 lb  Ideal Body Weight (IBW), Female: 140 lb  % Ideal Body Weight, Female (lb): 170.39 lb  BMI (Calculated): 36.3  BMI Grade: 35 - 39.9 - obesity - grade II  Usual Body Weight (UBW), k kg  % Usual Body Weight: 99.47     27# weight loss in the past year, most very recent( some could be fluid losses)  Lab/Procedures/Meds     Pertinent Labs Reviewed: reviewed  Pertinent Labs Comments: Na 126, K 4.0, albumin 3.0  Pertinent Medications Reviewed: reviewed  Pertinent Medications Comments: Warfarin     Estimated/Assessed Needs     Weight Used For Calorie Calculations: 108.2 kg (238 lb 8.6 oz)      Energy Calorie Requirements (kcal): 2145 kcal/d   Energy Need Method: Clarke-St Jeor(1.25 PAL)      Protein Requirements: 108-130 g/d (1-1.2 g/kg)Weight Used For Protein Calculations: 108.2 kg (238 lb 8.6 oz)      Estimated Fluid Requirement Method: other (see comments)(Per MD or 1 " mL/kcal)      Nutrition Prescription Ordered     Current Diet Order: Renal, Diabetic  Nutrition Order Comments: 800 mL FR  Oral Nutrition Supplement: Novasource ONS     Evaluation of Received Nutrient/Fluid Intake    PO intake< 75% patient is taking ONS    Nutrition Risk     Level of Risk/Frequency of Follow-up: (1x/week)      Assessment and Plan  Moderate malnutrition in the context of Acute Illness/Injury    Related to (etiology):  Hypermetabolism of acute diseas    Signs and Symptoms (as evidenced by):  Energy Intake: <75% of estimated energy requirement for > one month  Body Fat Depletion: mild depletion of orbitals   Muscle Mass Depletion: mild depletion of temples, clavicle region, interosseous muscle and lower extremities   Weight Loss: 8% x one month( could be fluid as well)  Fluid Accumulation: mild    Interventions/Recommendations (treatment strategy):  Mineral modified diet  Carbohydrate modified diet  Fluid Restricted diet- 800 ml + ONS  Protein modified diet    Nutrition Diagnosis Status:  New    Monitor and Evaluation     Food and Nutrient Intake: energy intake, food and beverage intake  Food and Nutrient Adminstration: diet order  Physical Activity and Function: nutrition-related ADLs and IADLs  Anthropometric Measurements: weight, weight change  Biochemical Data, Medical Tests and Procedures: inflammatory profile, lipid profile, glucose/endocrine profile, gastrointestinal profile, electrolyte and renal panel  Nutrition-Focused Physical Findings: overall appearance      Nutrition Follow-Up     RD Follow-up?: Yes

## 2019-06-14 NOTE — PLAN OF CARE
Problem: Physical Therapy Goal  Goal: Physical Therapy Goal  Goals to be met by: 19     Patient will increase functional independence with mobility by performin. Supine to sit with Set-up [place]  2. Sit to supine with Stand-by Assistance  3. Sit to stand transfer with Stand-by Assistance  4. Bed to chair transfer with Stand-by Assistance using Rolling Walker  5. Gait  x 100 feet with Stand-by Assistance using Rolling Walker.   6. Ascend/descend 5 stair with right OR left Handrails Contact Guard Assistance   7. Stand for 2 minutes with Stand-by Assistance using Rolling Walker while performing activity     Outcome: Ongoing (interventions implemented as appropriate)  LTGs remain appropriate. Pt will continue PT POC.    Erica Mast, PT  2019

## 2019-06-14 NOTE — PROGRESS NOTES
Pt. Returned to facility from nephro appt. per 2 Help via w/c.pt/ voices no complaints at present time.will continue to monitor.

## 2019-06-14 NOTE — ASSESSMENT & PLAN NOTE
Malnutrition in the context of Acute Illness/Injury    Related to (etiology):  Hypermetabolism of acute diseas    Signs and Symptoms (as evidenced by):  Energy Intake: <75% of estimated energy requirement for > one month  Body Fat Depletion: mild depletion of orbitals   Muscle Mass Depletion: mild depletion of temples, clavicle region, interosseous muscle and lower extremities   Weight Loss: 8% x one month  Fluid Accumulation: mild    Interventions/Recommendations (treatment strategy):  Mineral modified diet  Carbohydrate modified diet  Fluid Restricted diet- 800 ml + ONS  Protein modified diet    Nutrition Diagnosis Status:  New

## 2019-06-14 NOTE — PT/OT/SLP PROGRESS
Physical Therapy  Treatment    Sisi Medel   MRN: [id number]   Admitting Diagnosis: Acute and chronic respiratory failure with hypoxia    PT Received On: 06/14/19        Billable Minutes:  Gait Training 15, Therapeutic Activity 25, Therapeutic Exercise 15 and Total Time 55    Treatment Type: Treatment  PT/PTA: PT     PTA Visit Number: 0       General Precautions: Standard, fall, seizure  Orthopedic Precautions: N/A   Braces: N/A    Spiritual, Cultural Beliefs, Congregation Practices, Values that Affect Care: no    Subjective:  Communicated with patient prior to session.  Pt agreeable to session.    Pain/Comfort  Pain Rating 1: 0/10    Objective:  Patient found supine in bed. Patient found with: oxygen(3lpm)     AM-PAC 6 CLICK MOBILITY  Total Score:14    Bed Mobility:  Supine>Sit: on bed w/ SPV, HOB elevated    Transfers:  Sit<>Stand: to/from w/c (2 trials) w/ RW and ModA to rise  Stand Pivot Transfer: EOB>w/c w/ RW and Jj to rise, w/c<>bedside commode w/ RW and ModA to rise  Cueing for forward scoot and hand placement    Gait:  Amb 2 trials (33ft and 28ft) w/ RW and CGA for safety  Cueing for posture  Limited in distance by fatigue  Pulse ox following each gait session= 86-87%, ~3min to recover to >90% w/ cueing for pursed lip breathing     Therex:  Seated BLE therex 4x10 reps (HF, LAQ,AP)    Balance:  Static stand w/ RW and CGA for safety, ~5min while pt completed posterior pericare    Additional Treatment:  Pt was educated on the importance of being OOB for inc'd time in order to improve strength and endurance. Pt verb understanding.    Patient left up in chair with all lines intact, call button in reach and nurse notified.    Assessment:  Sisi Medel is a 58 y.o. female with a medical diagnosis of Acute and chronic respiratory failure with hypoxia.  Pt was limited t/o session by fatigue and decreased endurance. She was however able to inc gait distance. She is progressing well but requires  encouragement t/o session along w/ inc'd time to recover. Pt will continue PT POC.    Rehab identified problem list/impairments: weakness, impaired endurance, impaired self care skills, impaired functional mobilty, gait instability, impaired balance, decreased lower extremity function    Rehab potential is good.    Activity tolerance: Good    Discharge recommendations: home with home health     Barriers to discharge: Inaccessible home environment    Equipment recommendations: tub bench     GOALS:   Multidisciplinary Problems     Physical Therapy Goals        Problem: Physical Therapy Goal    Goal Priority Disciplines Outcome Goal Variances Interventions   Physical Therapy Goal     PT, PT/OT Ongoing (interventions implemented as appropriate)     Description:  Goals to be met by: 19     Patient will increase functional independence with mobility by performin. Supine to sit with Set-up [place]  2. Sit to supine with Stand-by Assistance  3. Sit to stand transfer with Stand-by Assistance  4. Bed to chair transfer with Stand-by Assistance using Rolling Walker  5. Gait  x 100 feet with Stand-by Assistance using Rolling Walker.   6. Ascend/descend 5 stair with right OR left Handrails Contact Guard Assistance   7. Stand for 2 minutes with Stand-by Assistance using Rolling Walker while performing activity                      PLAN:    Patient to be seen 4 x/week(Mon/Wed/Fri/Sun)  to address the above listed problems via gait training, therapeutic activities, therapeutic exercises  Plan of Care expires: 19  Plan of Care reviewed with: patient, significant other    Erica Mast, PT  2019

## 2019-06-15 NOTE — PLAN OF CARE
Problem: Adult Inpatient Plan of Care  Goal: Plan of Care Review  Outcome: Ongoing (interventions implemented as appropriate)     06/15/19 0140   Plan of Care Review   Plan of Care Reviewed With patient       Problem: Fall Injury Risk  Goal: Absence of Fall and Fall-Related Injury  Outcome: Ongoing (interventions implemented as appropriate)  Intervention: Identify and Manage Contributors to Fall Injury Risk     06/15/19 0140   Manage Acute Allergic Reaction   Medication Review/Management medications reviewed   Identify and Manage Contributors to Fall Injury Risk   Self-Care Promotion BADL personal routines maintained;BADL personal objects within reach;safe use of adaptive equipment encouraged     Intervention: Promote Injury-Free Environment     06/15/19 0140   Optimize [place] and Functional Mobility   Environmental Safety Modification assistive device/personal items within reach;clutter free environment maintained;lighting adjusted;mobility aid in reach;room organization consistent   Optimize Balance and Safe Activity   Safety Promotion/Fall Prevention assistive device/personal item within reach;commode/urinal/bedpan at bedside;Fall Risk reviewed with patient/family;Fall Risk signage in place;lighting adjusted;medications reviewed;instructed to call staff for mobility;upper side rails raised x 2, lower siderails raised x 1 (Peds only);side rails raised x 3

## 2019-06-15 NOTE — PT/OT/SLP PROGRESS
Physical Therapy      Patient Name:  Sisi Medel   MRN:  [id number]    Patient not seen today secondary to Patient fatigue, Patient unwilling to participate. PT will follow-up tomorrow.    Sima Lockhart, PTA

## 2019-06-15 NOTE — PROGRESS NOTES
Patient back from dialysis, aaox4, no distress noted, vitals taken and recorded, afebrile, left upper arm dialysis fistula covered with 2x2 gauze clean,dry and intact, no complications noted. Pt sitting on w/c at bedside, lunch served, afterwards pt's family arrived. Call light in reach. Pt instructed to call prn.

## 2019-06-15 NOTE — PROGRESS NOTES
Pt left via [employer] in a wheelchair to dialysis. VSS. o2 at 3l nc. Left fa fistula intact. No signs of distress noted. Snack bag given to pt. No c/o pain at this time.

## 2019-06-16 NOTE — PLAN OF CARE
Problem: Physical Therapy Goal  Goal: Physical Therapy Goal  Goals to be met by: 19     Patient will increase functional independence with mobility by performin. Supine to sit with Set-up [place]. Not met  2. Sit to supine with Stand-by Assistance. Not met  3. Sit to stand transfer with Stand-by Assistance. Not met  4. Bed to chair transfer with Stand-by Assistance using Rolling Walker. Not met  5. Gait  x 100 feet with Stand-by Assistance using Rolling Walker.  Not met  6. Ascend/descend 5 stair with right OR left Handrails Contact Guard Assistance. Not met  7. Stand for 2 minutes with Stand-by Assistance using Rolling Walker while performing activity. Met (2019)     Outcome: Ongoing (interventions implemented as appropriate)  Met one goal today.

## 2019-06-16 NOTE — PT/OT/SLP PROGRESS
"Physical Therapy  Treatment    Sisi Medel   MRN: [id number]   Admitting Diagnosis: Acute and chronic respiratory failure with hypoxia    PT Received On: 06/16/19          Billable Minutes:  Gait Training 15, Therapeutic Activity 15 and Therapeutic Exercise 15=45 mins    Treatment Type: Treatment  PT/PTA: PT     PTA Visit Number: 0       General Precautions: Standard, fall, seizure  Orthopedic Precautions: N/A   Braces: N/A    Spiritual, Cultural Beliefs, Hoahaoism Practices, Values that Affect Care: no    Subjective:  Agreeable to PT Services. "I had fun with y'all today." Pt was singing during therapy today. 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    Transfers:  Sit<>Stand: CGA-Jj  Stand Pivot Transfer: CGA with use of BUE support    Gait:  Amb 11, 29, 38 feet with seated rest break in between trials using a rolling walker, swing-through gait, slow merly, CGA needed for slight instability.     SpO2: 78%-->91% (in 30-60 seconds)    Therex:  Standing: Hip flexion, heel raises x 10 reps with UE support    Balance:  Stood for 2 mins, volleying balloon back and forth with unilateral UE support, SBA- no loss of balance- goal met    Patient left up in chair with call button in reach.    Assessment:  Sisi Medel is a 58 y.o. female with a medical diagnosis of Acute and chronic respiratory failure with hypoxia.  Ms. Medel met one goal today, and it seems as though her spirits were lifted today while in therapy, listening to music. She will benefit from further PT services, focusing largely on her endurance.    Rehab identified problem list/impairments: weakness, impaired endurance, impaired self care skills, impaired functional mobilty, gait instability, impaired balance, decreased lower " extremity function    Rehab potential is good.    Activity tolerance: Good    Discharge recommendations: home with home health     Barriers to discharge: Inaccessible home environment    Equipment recommendations: tub bench     GOALS:   Multidisciplinary Problems     Physical Therapy Goals        Problem: Physical Therapy Goal    Goal Priority Disciplines Outcome Goal Variances Interventions   Physical Therapy Goal     PT, PT/OT Ongoing (interventions implemented as appropriate)     Description:  Goals to be met by: 19     Patient will increase functional independence with mobility by performin. Supine to sit with Set-up [place]. Met (2019)  2. Sit to supine with Stand-by Assistance. Not met  3. Sit to stand transfer with Stand-by Assistance. Not met  4. Bed to chair transfer with Stand-by Assistance using Rolling Walker. Not met  5. Gait  x 100 feet with Stand-by Assistance using Rolling Walker.  Not met  6. Ascend/descend 5 stair with right OR left Handrails Contact Guard Assistance. Not met  7. Stand for 2 minutes with Stand-by Assistance using Rolling Walker while performing activity. Met (2019)                       PLAN:    Patient to be seen 4 x/week(Mon/Wed/Fri/Sun)  to address the above listed problems via gait training, therapeutic activities, therapeutic exercises  Plan of Care expires: 19  Plan of Care reviewed with: patient, significant other    Stephany Kessler, PT  2019

## 2019-06-16 NOTE — PT/OT/SLP PROGRESS
"Occupational Therapy  Treatment    Sisi Medel   MRN: [id number]   Admitting Diagnosis: Acute and chronic respiratory failure with hypoxia    OT Date of Treatment: 06/16/19       Billable Minutes:  Self Care/Home Management 43    General Precautions: Standard, fall, seizure(renal diet )  Orthopedic Precautions: N/A  Braces: N/A    Spiritual, Cultural Beliefs, Yarsani Practices, Values that Affect Care: no    Subjective:  Communicated with nurse prior to session.  "You see how dry my feet are?  I wish I had lotion for them."  Pt advised to speak to MD or nurse practitioner about what lotions she may use - might need medicated lotion    Pain/Comfort  Pain Rating 1: 0/10  Pain Rating Post-Intervention 1: 0/10    Objective:  Patient found with: oxygen(supine in bed)    Occupational Performance:    Bed Mobility:    · Patient completed Rolling/Turning to Left with  modified independence and with side rail  · Patient completed Rolling/Turning to Right with modified independence and with side rail  · Patient completed Supine to Sit with modified independence and with side rail  · Patient completed Sit to Supine with modified independence     Functional Mobility/Transfers:  · Did not stand during tx session - sitting EOB for session    Activities of Daily Living:  · Feeding:  modified independence breakfast  · Grooming: supervision set up A  · Upper Body Dressing: supervision set up A sitting EOB to change 2 hospital gowns  · Lower Body Dressing: maximal assistance able to doff R sock - needed A to callie/doff L and callie R - sidesitting on EOB    AMPA 6 Click:  AMPA Total Score: 17      Additional Treatment:  · Pt completed ADLs and func mobility activities for tx session as noted above  · Whiteboard updated  · Pt educated on role of OT and POC      Patient left sitting EOB with all lines intact and call button in reach    ASSESSMENT:  Sisi Medel is a 58 y.o. female with a medical diagnosis of Acute and " chronic respiratory failure with hypoxia and deficits noted below.  Pt was agreeable to OT and was noted to make progress towards her goals in therapy.  During today's session, pt met 2 goals: UE dressing and supine to sit.  Pt's goals remain appropriate at this time.  She will continue to benefit from skilled OT services in order to assist her with increasing her safety and level of independence with self care and mobility tasks.         Rehab identified problem list/impairments: impaired endurance, impaired self care skills, impaired functional mobilty    Rehab potential is good    Activity tolerance: Fair    Discharge recommendations:       Barriers to discharge: None     Equipment recommendations: (TBD as pt. progresses)     GOALS:   Multidisciplinary Problems     Occupational Therapy Goals        Problem: Occupational Therapy Goal    Goal Priority Disciplines Outcome Interventions   Occupational Therapy Goal     OT, PT/OT Ongoing (interventions implemented as appropriate)    Description:  Goals to be met by: 06-24-19      Therapy to be on MWF and SUN (4x/week) addended 06-13-`19    Patient will increase functional independence with ADLs by performing:    UE Dressing with Set-up Assistance. GOAL MET 06/16/19  LE Dressing with Supervision.  Grooming while standing with Supervision.  Toileting from bedside commode with Supervision for hygiene and clothing management.   Bathing from  edge of bed with Supervision.  Supine to sit with Modified [place]. GOAL MET 06/16/19  Stand pivot transfers with Supervision.  Toilet transfer to bedside commode with Supervision.  Pt. To be I with HEP for BUE to improve level of endurance  Pt. To engage in dynamic standing activity x 10 minutes with S                        Plan:  Patient to be seen 4 x/week(M, W, F and Sun) to address the above listed problems via self-care/home management, therapeutic activities, therapeutic exercises  Plan of Care expires: 07/11/19  Plan of  Care reviewed with: patient    Kathleen JOSE MARIA Yareli, OT  06/16/2019

## 2019-06-16 NOTE — NURSING
"Patient was in a "panic calling for HELP" when staff entered into the room, Pt had oxygen off . Reapplied O2 she was Reoriented to place and time Vitals obtained per flow sheet WNL Blood glucose 163. She verbalized that she had a nightmare and was asking that "nurse not leave her. " She asked for Tylenol 650 mg oral meds given per MAR. For pain 3/10 to BLE. "aching" Anxiety began to subside. Safety maintained. Bed low sr up x 2 call bell in reach safety maintained.   "

## 2019-06-17 NOTE — PLAN OF CARE
Problem: Occupational Therapy Goal  Goal: Occupational Therapy Goal  Goals to be met by: 06-24-19      Therapy to be on MWF and SUN (4x/week) addended 06-13-`19    Patient will increase functional independence with ADLs by performing:    UE Dressing with Set-up Assistance. GOAL MET 06/16/19  LE Dressing with Supervision.  Grooming while standing with Supervision.  Toileting from bedside commode with Supervision for hygiene and clothing management.   Bathing from  edge of bed with Supervision.  Supine to sit with Modified [place]. GOAL MET 06/16/19  Stand pivot transfers with Supervision.  Toilet transfer to bedside commode with Supervision.  Pt. To be I with HEP for BUE to improve level of endurance  Pt. To engage in dynamic standing activity x 10 minutes with S       Outcome: Ongoing (interventions implemented as appropriate)  .

## 2019-06-17 NOTE — PLAN OF CARE
Problem: Adult Inpatient Plan of Care  Goal: Plan of Care Review  Outcome: Ongoing (interventions implemented as appropriate)  Pt progressing towards goals.remains on o2 at 2l nc.polina midline cath intact.left av fistula intact with ++.safety precautions maintained.pt free of falls or injuries.continue plan of care.

## 2019-06-17 NOTE — PT/OT/SLP PROGRESS
Physical Therapy  Treatment    Sisi Medel   MRN: [id number]   Admitting Diagnosis: Acute and chronic respiratory failure with hypoxia    PT Received On: 06/17/19          Billable Minutes:  Gait Training 20, Therapeutic Activity 13, Therapeutic Exercise 25 and Total Time 58    Treatment Type: Treatment  PT/PTA: PT     PTA Visit Number: 0       General Precautions: Standard, fall, seizure  Orthopedic Precautions: N/A   Braces: N/A    Spiritual, Cultural Beliefs, Hindu Practices, Values that Affect Care: no    Subjective:  Communicated with patient prior to session.  Pt agreeable to session    Pain/Comfort  Pain Rating 1: 0/10  Pain Rating Post-Intervention 1: 0/10    Objective:  Patient found supine in bed. Patient found with: oxygen     AM-PAC 6 CLICK MOBILITY  Total Score:17    Bed Mobility:  Sit>Supine: Not performed  Supine>Sit: SBA  Bed mobility performed in bed.    Transfers:  Sit<>Stand: to/from w/c x4 trials c/ RW CGA-Jj  Stand Pivot Transfer: bed to w/c c/ RW and CGA  Pt cued for hand placement during transfers.  Excess forward flexion during sit>stand noted. Pt cued to have upright posture during sit>stand.    Gait:  Amb x3 trials (38 ft, 55 ft, 55 ft) c/ RW and CGA/SBA due to mild instability. Ambulation distance limited by SOB and fatigue.  SpO2: 85% > 95% in 1 min     Therex:  Seated, 2x10 BLE:  -AP  -LAQ  -HF  AAROM for LLE as needed    Balance:  Balloon tap x 2 min 30 sec using unilateral UE c/ RW and CGA/SBA for safety.    Additional Treatment:  UBE c/ minimal resistance x15 min to improve strength/endurance.     Patient left seated EOB with call button in reach and visitor present.    Assessment:  Sisi Medel is a 58 y.o. female with a medical diagnosis of Acute and chronic respiratory failure with hypoxia.  Pt avinash session well. Pt increased ambulation distances but remains limited by SOB and fatigue. Pt will continue to benefit from skilled PT to improve endurance.    Rehab  identified problem list/impairments: weakness, impaired endurance, impaired self care skills, impaired functional mobilty, gait instability, impaired balance, decreased lower extremity function    Rehab potential is good.    Activity tolerance: Good    Discharge recommendations: home with home health     Barriers to discharge: Inaccessible home environment    Equipment recommendations: tub bench     GOALS:   Multidisciplinary Problems     Physical Therapy Goals        Problem: Physical Therapy Goal    Goal Priority Disciplines Outcome Goal Variances Interventions   Physical Therapy Goal     PT, PT/OT Ongoing (interventions implemented as appropriate)     Description:  Goals to be met by: 19     Patient will increase functional independence with mobility by performin. Supine to sit with Set-up [place]. Not met  2. Sit to supine with Stand-by Assistance. Not met  3. Sit to stand transfer with Stand-by Assistance. Not met  4. Bed to chair transfer with Stand-by Assistance using Rolling Walker. Not met  5. Gait  x 100 feet with Stand-by Assistance using Rolling Walker.  Not met  6. Ascend/descend 5 stair with right OR left Handrails Contact Guard Assistance. Not met  7. Stand for 2 minutes with Stand-by Assistance using Rolling Walker while performing activity.  REVISED: Stand for 3 min c/ Stand-by Assistance using Rolling Walker while performing activity.                         PLAN:    Patient to be seen 4 x/week(Mon/Wed/Fri/Sun)  to address the above listed problems via gait training, therapeutic activities, therapeutic exercises  Plan of Care expires: 19  Plan of Care reviewed with: patient    Refugio Azar, RAMON  2019

## 2019-06-17 NOTE — PLAN OF CARE
Problem: Physical Therapy Goal  Goal: Physical Therapy Goal  Goals to be met by: 19     Patient will increase functional independence with mobility by performin. Supine to sit with Set-up [place]. Not met  2. Sit to supine with Stand-by Assistance. Not met  3. Sit to stand transfer with Stand-by Assistance. Not met  4. Bed to chair transfer with Stand-by Assistance using Rolling Walker. Not met  5. Gait  x 100 feet with Stand-by Assistance using Rolling Walker.  Not met  6. Ascend/descend 5 stair with right OR left Handrails Contact Guard Assistance. Not met  7. Stand for 2 minutes with Stand-by Assistance using Rolling Walker while performing activity. Met (2019)      Outcome: Ongoing (interventions implemented as appropriate)  LTGs remain appropriate. Pt will continue PT POC.    Refugio Askew, RAMON  2019

## 2019-06-17 NOTE — PROGRESS NOTES
[facility]                                  Skilled Nursing Facility                   Progress Note     Admit Date: 6/10/2019  JACKELYN TBD   Principal Problem:  Acute and chronic respiratory failure with hypoxia   HPI obtained from patient interview and chart review     Chief Complaint:  Hypophosphatemia, Revaluation of medical treatment and therapy status: Lab review    HPI:   Ms. Medel is a 58 year old female with PMHx of pulmonary sarcoidosis on 3-5L home oxygen, Class 3 HFrEF (3/2019 EF 35%), perm Afib/flutter s/p 6/2017 AVN ablation & PPM, ESRD (HD TTS), Type 2 DM (3/2019 A1c 6.2%), pulmonary HTN, YOANDY, and orthostatic hypotension who presents to SNF following a hospitalization for fluid overload with respiratory decompensation and bacteremia.    Interval history: All labs reviewed.  Sodium remains low but imrpoved to 133 from 126, potassium 4.3, chloride low at 89, BUN/creatinine in 39/5.0, AST/ALT 39/35.  WBCs increased from 17.88 to 18.19; likely due to long-term steroid use.  H&H decreased to 9.8/33 from 11.3/36.  Platelets stable at to 233.  INR stable at 2.6.  Patient progessing with PT/OT. Patient medically complex. Continuing to follow and treat all acute and chronic conditions.    Past Medical History: Patient has a past medical history of Anemia in ESRD (end-stage renal disease) (3/7/2016), Anticoagulant long-term use, Cervical radiculopathy (7/20/2015), CHF (congestive heart failure), Chronic combined systolic and diastolic heart failure (6/14/2013), Chronic respiratory failure with hypoxia (04/22/2013), Closed fracture of proximal end of right fibula (6/27/2016), Complications due to renal dialysis device, implant, and graft, DDD (degenerative disc disease), lumbar (6/17/2015), Dependence on renal dialysis, Diabetes mellitus type II, controlled (12/8/2017), ESRD on hemodialysis (2/23/2016), Essential hypertension, Gout,  Lateral meniscal tear (2016), Lumbar stenosis (2015), Obesity, Class III, BMI 40-49.9 (morbid obesity), Pacemaker, Paroxysmal atrial fibrillation (2014), Peripheral neuropathy (2013), Personal history of gastric ulcer (3/19/2013), Pneumonia of left lower lobe due to infectious organism (3/10/2019), Sarcoidosis, lung (), Secondary pulmonary hypertension (2015), Seizure disorder (3/19/2013), Shingles (2013), and Thyroid disease.    Past Surgical History: Patient has a past surgical history that includes Abdominal surgery;  section; OTHER SURGICAL HISTORY; Vascular surgery; stent to fistula; Cardiac pacemaker placement; Fistulogram (Left, 2019); Percutaneous transluminal angioplasty of arteriovenous fistula (N/A, 2019); Fistulogram (Left, 2019); and Fistulogram (Left, 2019).    Social History: Patient reports that she quit smoking about 25 years ago. Her smoking use included cigarettes. She has a 5.00 pack-year smoking history. She has never used smokeless tobacco. She reports that she does not drink alcohol or use drugs.    Family History: family history includes Coronary artery disease in her father; Diabetes in her father, maternal grandmother, and mother; Hypertension in her father and mother; Kidney failure in her mother; Lupus in her sister.    Allergies: Patient is allergic to bactrim [sulfamethoxazole-trimethoprim] and nsaids (non-steroidal anti-inflammatory drug).    ROS  Constitutional: Negative for fever. + fatigue.   Eyes: Negative for blurred vision, double vision and discharge.   Respiratory: +for cough, shortness of breath- close to baseline. Neg for wheezing.    Cardiovascular: Negative for chest pain, palpitations, claudication, + leg swelling.   Gastrointestinal: Negative for abdominal pain, constipation, diarrhea, nausea and vomiting. awating BM; pt says is coming today   Genitourinary: Negative for dysuria, frequency and urgency.    Musculoskeletal:  + generalized weakness, gait instability. Negative for back pain and myalgias.   Skin: Negative for itching and rash.   Neurological: Negative for dizziness, speech change, and headaches.   Psychiatric/Behavioral: Negative for depression. The patient is not nervous/anxious.      PEx  Temp:  [96.2 °F (35.7 °C)-97.8 °F (36.6 °C)]   Pulse:  [69-70]   Resp:  [18]   BP: (111-115)/(73-75)   SpO2:  [92 %-97 %]      Constitutional: Patient appears obese and in no distress   HENT:   Head: Normocephalic and atraumatic.   Eyes: Pupils are equal, round, and reactive to light.   Neck: Normal range of motion. Neck supple.   Cardiovascular: Normal rate, regular rhythm and normal heart sounds.    Pulmonary/Chest: Effort normal and breath sounds are clear.  4L O2 via NC in progress  Abdominal: Soft. Bowel sounds are normal.   Musculoskeletal: Normal range of motion.   Neurological: Alert and oriented to person, place, and time.   Skin: Skin is warm and dry.  Entire body skin assess.  Moisture associated dermatitis noted to underneath breasts and underneath the abdominal fold, mild.  Ulcers to left and right foot, the information below. LUE AVF + thrill and bruit   Psychiatric: Normal mood and affect. Behavior is normal.          06/11/19 1600        Wound 06/04/19 Laceration Toe, first   Date First Assessed: 06/04/19   Pre-existing: (c)   Primary Wound Type: Laceration  Side: Left  Location: Toe, first   Wound Image    Wound WDL ex   Dressing Appearance Dry;Intact;Clean   Drainage Amount None   Drainage Characteristics/Odor No odor   Appearance Pink;Dry   Tissue loss description Not applicable   Periwound Area Intact;Dry   Wound Edges Open   Wound Length (cm) 0.5 cm   Wound Width (cm) 0.5 cm   Wound Depth (cm) 0 cm   Wound Volume (cm^3) 0 cm^3   Wound Surface Area (cm^2) 0.25 cm^2   Care Cleansed with:;Sterile normal saline   Dressing Changed;Applied;Foam   Dressing Change Due 06/18/19        Wound 06/04/19  Ulceration Toe, first   Date First Assessed: 06/04/19   Pre-existing: Yes  Primary Wound Type: Ulceration  Side: Right  Location: Toe, first   Wound WDL ex   Dressing Appearance Open to air;No dressing   Drainage Amount None   Drainage Characteristics/Odor No odor   Appearance Dry   Tissue loss description Not applicable   Periwound Area Intact;Dry   Wound Edges Callused   Wound Length (cm) 0.5 cm   Wound Width (cm) 0.5 cm   Wound Depth (cm) 0 cm   Wound Volume (cm^3) 0 cm^3   Wound Surface Area (cm^2) 0.25 cm^2        Wound 06/04/19 Ulceration lateral Leg   Date First Assessed: 06/04/19   Pre-existing: Yes  Primary Wound Type: Ulceration  Side: Right  Orientation: lateral  Location: Leg   Wound WDL ex   Dressing Appearance Dry;Intact;Dried drainage   Drainage Amount Scant   Drainage Characteristics/Odor No odor   Appearance Pink;Dry   Tissue loss description Partial thickness   Periwound Area Intact;Dry   Wound Edges Open   Wound Length (cm) 0.3 cm   Wound Width (cm) 0.3 cm   Wound Depth (cm) 0 cm   Wound Volume (cm^3) 0 cm^3   Wound Surface Area (cm^2) 0.09 cm^2   Care Cleansed with:;Sterile normal saline   Dressing Changed;Applied;Foam   Dressing Change Due 06/18/19   Skin Interventions   Pressure Reduction Devices feet on footrest/footstool;foam padding utilized;positioning supports utilized  (heels offloaded on pillow)   Pressure Reduction Techniques frequent weight shift encouraged;heels elevated off bed;positioned off wounds;pressure points protected   Right heel- dried skin    Right heel medial    Left heel           Assessment and Plan:    Hypophosphatemia  ESRD on hemodialysis  - HD per nephro Betina and Sat schedule  - HD access: [facility]  - Outpatient HD unit: [facility]   -Nephrologist: Patient does not know  - biweekly renal labs   - continue cinacalcet 30 mg with breakfast, sevelamer carbonate 1600 mg t.i.d. with meals  - 6/17 holding Sevelamer due to low phos of 1.5.     Leukocytosis  - 6/13  patient has had a steadily rising leukocytosis since 6/4.  Today WBCs 17.88 from 16.97.  This is likely due to long-term steroid use.  Patient was on the wrong dose of prednisone [place] [facility] [place].  Prednisone increased to 20 mg daily yesterday.  Will continue to trend WBC biweekly.  Patient remains afebrile  - 6/17 increased to 18.19    Transaminitis  - Suspect secondary to sepsis, has been downtrending. Biliary duct appeared normal on CT abd   - on 6/10 AST/ALT 36/48.    - 6/12 CMP ordered- Will continue to trend until normal range  - 6/17 AST/ALT normalized to 39/35    CONTINUE    Klebsiella oxytoca and Streptococcus salivarius bacteremia  - BCx positive on 5/30. Repeat BCx NGTD on 5/31  - IV ceftriaxone 2g q24hrs x2 weeks per ID - end of therapy 6/14/2019   - midline catheter in place  - no source identified, most suspicious for GI source and possible translocation in patient on chronic steroids     Debility   - Continue with PT/OT for gait training and strengthening and restoration of ADL's   - Encourage mobility, OOB in chair, and early ambulation as appropriate  - Fall precautions   - Monitor for bowel and bladder dysfunction  - Monitor for and prevent skin breakdown and pressure ulcers  - Continue DVT prophylaxis with coumadin      Wound of 1st toe and right skin  - Wound care consulted and has now signed off   - The left great toe has a resolving wound- partial thickness skin tear- ~ 0.5 cm x 0.5 cm x 0.  Cleansed with sterile normal saline and foam dressing applied over area.   - The right shin has a partial thickness skin tear over scar tissue.  Cleansed with sterile normal saline/foam dressing in place.   - pt see Dr. Woodson for podiatry- told pt to set up apt post DC     Paroxysmal A-fib  Long-term anticoagulation- Goal INR 2-3   - INR 1.6, 2.2, 3.1, 4.7, 2.8 over last 5 days. coumadin held for last two days due to supra therapeutic levels after being given a boost in doing when INR was subtherapeutic  for 4 days (6/4- 6/7)    - 6/12 initiated home dosing of warfarin 5mg on Tues, Thurs, Sat; 2.5mg Mon, Wed, Fri      Acute on chronic combined systolic and diastolic heart failure  - Echo 3/11/19: EF 35% with diastolic dysfunction  - continue aspirin 81mg daily, Plavix 75 mg daily, atorvastatin 80mg  - not on GDMT; BP too low chronically for ACE, BB, etc      Diabetes mellitus, type 2  -  Takes tradjenta 5mg daily at home. On 2units detemir here  - last A1c from 5/30 was 6.5     Secondary adrenal insufficiency  - continue fludrocortisone 100 mcg b.i.d., midodrine 10 mg t.i.d.    Epilepsy  - continue keppra 500mg bid     Pulmonary sarcoidosis  - On 4L O2 chronically  - 6/12 increased prednisone to 20mg daily, per Dr. Altamirano's note and pt states she takes 20mg at home.  - will need outpatient follow up with pulmonology     Apt post DC:  Pulmonary, Podiatry, Nephrology    Future Appointments   Date Time Provider Department Center   7/1/2019  9:00 AM Annette Hernandez NP [facility] ENDODIA [facility]   7/2/2019  8:00 AM HOME MONITOR DEVICE CHECK, [facility]   7/5/2019  9:00 AM Carlos A Caputo MD [facility] IM [facility] PCW   9/20/2019 10:00 AM [facility] EMG PROCEDURAL LAB [facility] NEURO [facility]   10/1/2019  8:00 AM HOME MONITOR DEVICE CHECK, [facility] AIDEN Sebastian NP      DOS: 6/17/2019

## 2019-06-17 NOTE — PT/OT/SLP PROGRESS
Occupational Therapy  Treatment    Sisi Medel   MRN: [id number]   Admitting Diagnosis: Acute and chronic respiratory failure with hypoxia    OT Date of Treatment: 06/17/19       Billable Minutes:  Self Care/Home Management 45    General Precautions: Standard, fall, seizure(renal diet )  Orthopedic Precautions: N/A  Braces: N/A    Spiritual, Cultural Beliefs, Oriental orthodox Practices, Values that Affect Care: no    Subjective:  Communicated with nsg prior to session.  I am doing well but I am just tired today    Pain/Comfort  Pain Rating 1: 0/10  Pain Rating Post-Intervention 1: 0/10    Objective:   Pt. Seated EOB on arrival     Occupational Performance:    Bed Mobility:    ·  Not tested     Functional Mobility/Transfers:  · Patient completed Sit <> Stand Transfer with minimum assistance  with  rolling walker from EOB with cues for safety Pt. With decreased standing bal and tolerance    Activities of Daily Living:  · Grooming: stand by assistance for grooming aspects  · Bathing: minimum assistance for standing bal and post veto area  · Upper Body Dressing: minimum assistance to manage gowns  · Lower Body Dressing: stand by assistance to callie BLE socks EOB level    [facility] 6 Click:  [facility] Total Score: 16      Patient left seated EOB with all lines intact, call button in reach and nsg present    ASSESSMENT:  Sisi Medel is a 58 y.o. female with a medical diagnosis of Acute and chronic respiratory failure with hypoxia Pt. participated well with session on this day.Pt. Continues have decrease endurance and tolerance to task and increase time for break required, Pt demos physical deficits with balance  functional mobility, UB strength, endurance  level of functional indep with daily tasks and activities and selfcare skills .Pt. Will continue to benefit from continued OT to progress towards goals  .    Rehab identified problem list/impairments: impaired endurance, impaired self care skills, impaired functional  mobilty    Rehab potential is fair    Activity tolerance: Fair    Discharge recommendations:       Barriers to discharge: None     Equipment recommendations: (TBD as pt. progresses)     GOALS:   Multidisciplinary Problems     Occupational Therapy Goals        Problem: Occupational Therapy Goal    Goal Priority Disciplines Outcome Interventions   Occupational Therapy Goal     OT, PT/OT Ongoing (interventions implemented as appropriate)    Description:  Goals to be met by: 06-24-19      Therapy to be on MWF and SUN (4x/week) addended 06-13-`19    Patient will increase functional independence with ADLs by performing:    UE Dressing with Set-up Assistance. GOAL MET 06/16/19  LE Dressing with Supervision.  Grooming while standing with Supervision.  Toileting from bedside commode with Supervision for hygiene and clothing management.   Bathing from  edge of bed with Supervision.  Supine to sit with Modified [place]. GOAL MET 06/16/19  Stand pivot transfers with Supervision.  Toilet transfer to bedside commode with Supervision.  Pt. To be I with HEP for BUE to improve level of endurance  Pt. To engage in dynamic standing activity x 10 minutes with S                    Plan:  Patient to be seen 4 x/week(M, W, F and Sun) to address the above listed problems via self-care/home management, therapeutic activities, therapeutic exercises  Plan of Care expires: 07/11/19  Plan of Care reviewed with: patient    SABINE Padilla  06/17/2019

## 2019-06-17 NOTE — NURSING
Pt called out for help,upon entering room pt in a panic.states she was having a nightmare.states this is the second time she experienced this type of episode.pt sat up on side of bed.remains with pt for emotional support.requested tylenol,administered.pt relaxed after approx 15min.

## 2019-06-17 NOTE — PLAN OF CARE
Problem: Physical Therapy Goal  Goal: Physical Therapy Goal  Goals to be met by: 19     Patient will increase functional independence with mobility by performin. Supine to sit with Set-up [place]. Not met  2. Sit to supine with Stand-by Assistance. Not met  3. Sit to stand transfer with Stand-by Assistance. Not met  4. Bed to chair transfer with Stand-by Assistance using Rolling Walker. Not met  5. Gait  x 100 feet with Stand-by Assistance using Rolling Walker.  Not met  6. Ascend/descend 5 stair with right OR left Handrails Contact Guard Assistance. Not met  7. Stand for 2 minutes with Stand-by Assistance using Rolling Walker while performing activity.  REVISED: Stand for 3 min c/ Stand-by Assistance using Rolling Walker while performing activity.       Outcome: Ongoing (interventions implemented as appropriate)  LTGs remain appropriate. Pt will continue PT POC.    Refugio Askew, RAMON  2019

## 2019-06-18 NOTE — NURSING
Back in room from dialysis at this time. Patient was escorted by  and patient was in wheel chair. Patient is awake alert and oriented x4 denies pain or discomfort. No distress noted.

## 2019-06-18 NOTE — PLAN OF CARE
Problem: Adult Inpatient Plan of Care  Goal: Plan of Care Review  Outcome: Ongoing (interventions implemented as appropriate)     06/18/19 0214   Plan of Care Review   Plan of Care Reviewed With patient       Problem: Fall Injury Risk  Goal: Absence of Fall and Fall-Related Injury  Outcome: Ongoing (interventions implemented as appropriate)  Intervention: Identify and Manage Contributors to Fall Injury Risk     06/18/19 0214   Manage Acute Allergic Reaction   Medication Review/Management medications reviewed   Identify and Manage Contributors to Fall Injury Risk   Self-Care Promotion BADL personal routines maintained;BADL personal objects within reach;safe use of adaptive equipment encouraged     Intervention: Promote Injury-Free Environment     06/18/19 0214   Optimize Counselor and Functional Mobility   Environmental Safety Modification assistive device/personal items within reach;clutter free environment maintained;lighting adjusted;mobility aid in reach   Optimize Balance and Safe Activity   Safety Promotion/Fall Prevention assistive device/personal item within reach;commode/urinal/bedpan at bedside;Fall Risk reviewed with patient/family;Fall Risk signage in place;lighting adjusted;medications reviewed;nonskid shoes/socks when out of bed;instructed to call staff for mobility;upper side rails raised x 2, lower siderails raised x 1 (Peds only)

## 2019-06-18 NOTE — PROGRESS NOTES
[facility]                                  Skilled Nursing Facility                   Progress Note     Admit Date: 6/10/2019  JACKELYN TBD   Principal Problem:  Acute and chronic respiratory failure with hypoxia   HPI obtained from patient interview and chart review     Visit Date: 6/18/2019    Chief Complaint:  Elevated INR, Revaluation of medical treatment and therapy status: Lab review, BP management    HPI:   Ms. Medel is a 58 year old female with PMHx of pulmonary sarcoidosis on 3-5L home oxygen, Class 3 HFrEF (3/2019 EF 35%), perm Afib/flutter s/p 6/2017 AVN ablation & PPM, ESRD (HD TTS), Type 2 DM (3/2019 A1c 6.2%), pulmonary HTN, YOANDY, and orthostatic hypotension who presents to SNF following a hospitalization for fluid overload with respiratory decompensation and bacteremia.    Interval history:   Lab work from today reviewed, INR elevated at 3.3, Initiate hold of Coumadin today for an elevated INR, will repeat tomorrow and titrate appropriately. Nursing also reporting that they are intermittently having to hold Midodrine due to elevated BP's. Initiate hold parameter orders today to ensure accurate dosing. Patient had HD today, Sevelamer held yesterday due to low phos. Initiate Phos level tomorrow morning to f/u on low phos.  Patient progessing with PT/OT. Patient medically complex. Continuing to follow and treat all acute and chronic conditions.      Past Medical History: Patient has a past medical history of Anemia in ESRD (end-stage renal disease) (3/7/2016), Anticoagulant long-term use, Cervical radiculopathy (7/20/2015), CHF (congestive heart failure), Chronic combined systolic and diastolic heart failure (6/14/2013), Chronic respiratory failure with hypoxia (04/22/2013), Closed fracture of proximal end of right fibula (6/27/2016), Complications due to renal dialysis device, implant, and graft, DDD (degenerative disc disease),  lumbar (2015), Dependence on renal dialysis, Diabetes mellitus type II, controlled (2017), ESRD on hemodialysis (2016), Essential hypertension, Gout, Lateral meniscal tear (2016), Lumbar stenosis (2015), Obesity, Class III, BMI 40-49.9 (morbid obesity), Pacemaker, Paroxysmal atrial fibrillation (2014), Peripheral neuropathy (2013), Personal history of gastric ulcer (3/19/2013), Pneumonia of left lower lobe due to infectious organism (3/10/2019), Sarcoidosis, lung (), Secondary pulmonary hypertension (2015), Seizure disorder (3/19/2013), Shingles (2013), and Thyroid disease.    Past Surgical History: Patient has a past surgical history that includes Abdominal surgery;  section; OTHER SURGICAL HISTORY; Vascular surgery; stent to fistula; Cardiac pacemaker placement; Fistulogram (Left, 2019); Percutaneous transluminal angioplasty of arteriovenous fistula (N/A, 2019); Fistulogram (Left, 2019); and Fistulogram (Left, 2019).    Social History: Patient reports that she quit smoking about 25 years ago. Her smoking use included cigarettes. She has a 5.00 pack-year smoking history. She has never used smokeless tobacco. She reports that she does not drink alcohol or use drugs.    Family History: family history includes Coronary artery disease in her father; Diabetes in her father, maternal grandmother, and mother; Hypertension in her father and mother; Kidney failure in her mother; Lupus in her sister.    Allergies: Patient is allergic to bactrim [sulfamethoxazole-trimethoprim] and nsaids (non-steroidal anti-inflammatory drug).    ROS  Constitutional: Negative for fever. + fatigue.   Eyes: Negative for blurred vision, double vision and discharge.   Respiratory: + for cough, shortness of breath, baseline. Neg for wheezing.    Cardiovascular: Negative for chest pain, palpitations, claudication, + leg swelling.   Gastrointestinal: Negative for abdominal  pain, constipation, diarrhea, nausea and vomiting.   Genitourinary: Negative for dysuria, frequency and urgency.   Musculoskeletal:  + generalized weakness, gait instability. Negative for back pain and myalgias.   Skin: Negative for itching and rash.   Neurological: Negative for dizziness, speech change, and headaches.   Psychiatric/Behavioral: Negative for depression. The patient is not nervous/anxious.      PEx  Temp:  [97.4 °F (36.3 °C)-97.5 °F (36.4 °C)]   Pulse:  [66-70]   Resp:  [16-20]   BP: (116-131)/(71-93)   SpO2:  [95 %-98 %]      Constitutional: Patient obese and in no distress   Head: Normocephalic and atraumatic.   Eyes: Pupils are equal, round, and reactive to light.   Neck: Normal range of motion. Neck supple.   Cardiovascular: Normal rate, regular rhythm and normal heart sounds.    Pulmonary/Chest: Effort normal and breath sounds are clear, O2 via NC   Abdominal: Soft. Bowel sounds are normal.   Musculoskeletal: Normal range of motion.   Neurological: Alert and oriented to person, place, and time.   Skin: Skin is warm and dry.  Entire body skin assess.  Moisture associated dermatitis noted to underneath breasts and underneath the abdominal fold, mild.  Ulcers to left and right foot, see pictures below. LUE AVF + thrill and bruit   Psychiatric: Normal mood and affect. Behavior is normal.          06/18/19 12:32        Wound 06/04/19 Laceration Toe, first   Date First Assessed: 06/04/19   Pre-existing: (c)   Primary Wound Type: Laceration  Side: Left  Location: Toe, first   Wound Image    Wound WDL ex   Dressing Appearance Dry;Intact;Clean   Drainage Amount None   Drainage Characteristics/Odor No odor   Appearance Pink;Dry   Tissue loss description Not applicable   Periwound Area Intact;Dry   Wound Edges Open   Wound Length (cm) 0.5 cm   Wound Width (cm) 0.5 cm   Wound Depth (cm) 0 cm   Wound Volume (cm^3) 0 cm^3   Wound Surface Area (cm^2) 0.25 cm^2   Care Cleansed with:;Sterile normal saline    Dressing Changed;Applied;Foam   Dressing Change Due 06/18/19        Wound 06/04/19 Ulceration Toe, first   Date First Assessed: 06/04/19   Pre-existing: Yes  Primary Wound Type: Ulceration  Side: Right  Location: Toe, first   Wound WDL ex   Dressing Appearance Open to air;No dressing   Drainage Amount None   Drainage Characteristics/Odor No odor   Appearance Dry   Tissue loss description Not applicable   Periwound Area Intact;Dry   Wound Edges Callused   Wound Length (cm) 0.5 cm   Wound Width (cm) 0.5 cm   Wound Depth (cm) 0 cm   Wound Volume (cm^3) 0 cm^3   Wound Surface Area (cm^2) 0.25 cm^2        Wound 06/04/19 Ulceration lateral Leg   Date First Assessed: 06/04/19   Pre-existing: Yes  Primary Wound Type: Ulceration  Side: Right  Orientation: lateral  Location: Leg   Wound WDL ex   Dressing Appearance Dry;Intact;Dried drainage   Drainage Amount Scant   Drainage Characteristics/Odor No odor   Appearance Pink;Dry   Tissue loss description Partial thickness   Periwound Area Intact;Dry   Wound Edges Open   Wound Length (cm) 0.3 cm   Wound Width (cm) 0.3 cm   Wound Depth (cm) 0 cm   Wound Volume (cm^3) 0 cm^3   Wound Surface Area (cm^2) 0.09 cm^2   Care Cleansed with:;Sterile normal saline   Dressing Changed;Applied;Foam   Dressing Change Due 06/18/19   Skin Interventions   Pressure Reduction Devices feet on footrest/footstool;foam padding utilized;positioning supports utilized  (heels offloaded on pillow)   Pressure Reduction Techniques frequent weight shift encouraged;heels elevated off bed;positioned off wounds;pressure points protected   Right heel- dried skin    Right heel medial    Left heel           Assessment and Plan:    Hypophosphatemia  ESRD on hemodialysis  - HD per nephro Betina and Sat schedule  - HD access: [facility]  - Outpatient HD unit: [facility]  [place]   -Nephrologist: Patient does not know  - biweekly renal labs   - continue cinacalcet 30 mg with breakfast, sevelamer carbonate 1600 mg t.i.d.  with meals  -Patient on Midodrine TID for low BP   - 6/17 holding Sevelamer due to low phos of 1.5.   -6/18 Patient had HD today, Sevelamer held yesterday due to low phos. Initiate Phos level tomorrow morning to f/u on low phos. Nursing also reporting that they are intermittently having to hold Midodrine due to elevated BP's. Initiate hold parameter orders today to ensure accurate dosing.    Paroxysmal A-fib  Long-term anticoagulation- Goal INR 2-3   - INR 1.6, 2.2, 3.1, 4.7, 2.8 over last 5 days. coumadin held for last two days due to supra therapeutic levels after being given a boost in doing when INR was subtherapeutic for 4 days (6/4- 6/7)    - 6/12 initiated home dosing of warfarin 5mg on Tues, Thurs, Sat; 2.5mg Mon, Wed, Fri   -6/18 Initiate hold of Coumadin today for an elevated INR of 3.3, will repeat tomorrow and titrate appropriately    CONTINUE    Leukocytosis  - 6/13 patient has had a steadily rising leukocytosis since 6/4.  Today WBCs 17.88 from 16.97.  This is likely due to long-term steroid use.  Patient was on the wrong dose of prednisone during [facility] [place].  Prednisone increased to 20 mg daily yesterday.  Will continue to trend WBC biweekly.  Patient remains afebrile  - 6/17 increased to 18.19    Transaminitis  - Suspect secondary to sepsis, has been downtrending. Biliary duct appeared normal on CT abd   - on 6/10 AST/ALT 36/48.    - 6/12 CMP ordered- Will continue to trend until normal range  - 6/17 AST/ALT normalized to 39/35    Klebsiella oxytoca and Streptococcus salivarius bacteremia  - BCx positive on 5/30. Repeat BCx NGTD on 5/31  - IV ceftriaxone 2g q24hrs x2 weeks per ID - end of therapy 6/14/2019   - midline catheter in place  - no source identified, most suspicious for GI source and possible translocation in patient on chronic steroids     Debility   - Continue with PT/OT for gait training and strengthening and restoration of ADL's   - Encourage mobility, OOB in chair, and early  ambulation as appropriate  - Fall precautions   - Monitor for bowel and bladder dysfunction  - Monitor for and prevent skin breakdown and pressure ulcers  - Continue DVT prophylaxis with coumadin      Wound of 1st toe and right skin  - Wound care consulted and has now signed off   - The left great toe has a resolving wound- partial thickness skin tear- ~ 0.5 cm x 0.5 cm x 0.  Cleansed with sterile normal saline and foam dressing applied over area.   - The right shin has a partial thickness skin tear over scar tissue.  Cleansed with sterile normal saline/foam dressing in place.   - pt see Dr. Woodson for podiatry- told pt to set up apt post DC     Acute on chronic combined systolic and diastolic heart failure  - Echo 3/11/19: EF 35% with diastolic dysfunction  - continue aspirin 81mg daily, Plavix 75 mg daily, atorvastatin 80mg  - not on GDMT; BP too low chronically for ACE, BB, etc      Diabetes mellitus, type 2  -  Takes tradjenta 5mg daily at home. On 2units detemir here  - last A1c from 5/30 was 6.5     Secondary adrenal insufficiency  - continue fludrocortisone 100 mcg b.i.d., midodrine 10 mg t.i.d.    Epilepsy  - continue keppra 500mg bid     Pulmonary sarcoidosis  - On 4L O2 chronically  - 6/12 increased prednisone to 20mg daily, per Dr. Altamirano's note and pt states she takes 20mg at home.  - will need outpatient follow up with pulmonology     Apt post DC:  Pulmonary, Podiatry, Nephrology    Future Appointments   Date Time Provider Department Center   7/1/2019  9:00 AM Annette Hernandez NP [facility] ENDODIA [facility]   7/2/2019  8:00 AM HOME MONITOR DEVICE CHECK, [facility]   7/5/2019  9:00 AM Carlos A Caputo MD [facility] IM [facility] PCW   9/20/2019 10:00 AM [facility] EMG PROCEDURAL LAB [facility] NEURO [facility]   10/1/2019  8:00 AM HOME MONITOR DEVICE CHECK, [facility] Sandy NP      DOS: 6/18/2019

## 2019-06-19 NOTE — PROGRESS NOTES
[facility]                                  Skilled Nursing Facility                   Progress Note     Admit Date: 6/10/2019  JACKELYN TBD   Principal Problem:  Acute and chronic respiratory failure with hypoxia   HPI obtained from patient interview and chart review     Visit Date: 6/19/2019    Chief Complaint:  Phos level/ INR, Revaluation of medical treatment and therapy status: Lab review    HPI:   Ms. Medel is a 58 year old female with PMHx of pulmonary sarcoidosis on 3-5L home oxygen, Class 3 HFrEF (3/2019 EF 35%), perm Afib/flutter s/p 6/2017 AVN ablation & PPM, ESRD (HD TTS), Type 2 DM (3/2019 A1c 6.2%), pulmonary HTN, YOANDY, and orthostatic hypotension who presents to SNF following a hospitalization for fluid overload with respiratory decompensation and bacteremia.    Interval history:   Lab work from today reviewed, INR 2.7, adjusted dose of Coumadin for today per pharm D. Coumadin today for an elevated INR. Will repeat tomorrow INR and titrate appropriately. Phos level is low at 1.9, dialysis to adjust with HD tomorrow, but improved from previous level.  Patient progessing with PT/OT. Patient medically complex. Continuing to follow and treat all acute and chronic conditions.      Past Medical History: Patient has a past medical history of Anemia in ESRD (end-stage renal disease) (3/7/2016), Anticoagulant long-term use, Cervical radiculopathy (7/20/2015), CHF (congestive heart failure), Chronic combined systolic and diastolic heart failure (6/14/2013), Chronic respiratory failure with hypoxia (04/22/2013), Closed fracture of proximal end of right fibula (6/27/2016), Complications due to renal dialysis device, implant, and graft, DDD (degenerative disc disease), lumbar (6/17/2015), Dependence on renal dialysis, Diabetes mellitus type II, controlled (12/8/2017), ESRD on hemodialysis (2/23/2016), Essential hypertension, Gout, Lateral  meniscal tear (2016), Lumbar stenosis (2015), Obesity, Class III, BMI 40-49.9 (morbid obesity), Pacemaker, Paroxysmal atrial fibrillation (2014), Peripheral neuropathy (2013), Personal history of gastric ulcer (3/19/2013), Pneumonia of left lower lobe due to infectious organism (3/10/2019), Sarcoidosis, lung (), Secondary pulmonary hypertension (2015), Seizure disorder (3/19/2013), Shingles (2013), and Thyroid disease.    Past Surgical History: Patient has a past surgical history that includes Abdominal surgery;  section; OTHER SURGICAL HISTORY; Vascular surgery; stent to fistula; Cardiac pacemaker placement; Fistulogram (Left, 2019); Percutaneous transluminal angioplasty of arteriovenous fistula (N/A, 2019); Fistulogram (Left, 2019); and Fistulogram (Left, 2019).    Social History: Patient reports that she quit smoking about 25 years ago. Her smoking use included cigarettes. She has a 5.00 pack-year smoking history. She has never used smokeless tobacco. She reports that she does not drink alcohol or use drugs.    Family History: family history includes Coronary artery disease in her father; Diabetes in her father, maternal grandmother, and mother; Hypertension in her father and mother; Kidney failure in her mother; Lupus in her sister.    Allergies: Patient is allergic to bactrim [sulfamethoxazole-trimethoprim] and nsaids (non-steroidal anti-inflammatory drug).    ROS  Constitutional: Negative for fever. + fatigue.   Eyes: Negative for blurred vision, double vision and discharge.   Respiratory: + for cough, shortness of breath, baseline. Neg for wheezing.    Cardiovascular: Negative for chest pain, palpitations, claudication, + leg swelling.   Gastrointestinal: Negative for abdominal pain, constipation, diarrhea, nausea and vomiting.   Genitourinary: Negative for dysuria, frequency and urgency.   Musculoskeletal:  + generalized weakness, gait instability.  Negative for back pain and myalgias.   Skin: Negative for itching and rash.   Neurological: Negative for dizziness, speech change, and headaches.   Psychiatric/Behavioral: Negative for depression. The patient is not nervous/anxious.      PEx  Temp:  [97.4 °F (36.3 °C)-97.6 °F (36.4 °C)]   Pulse:  [62-70]   Resp:  [16-20]   BP: (116-128)/(71-93)   SpO2:  [95 %-98 %]      Constitutional: Patient obese and in no distress   Head: Normocephalic and atraumatic.   Eyes: Pupils are equal, round, and reactive to light.   Neck: Normal range of motion. Neck supple.   Cardiovascular: Normal rate, regular rhythm and normal heart sounds.    Pulmonary/Chest: Effort normal and breath sounds are clear, O2 via NC   Abdominal: Soft. Bowel sounds are normal.   Musculoskeletal: Normal range of motion.   Neurological: Alert and oriented to person, place, and time.   Skin: Skin is warm and dry.  Moisture associated dermatitis noted to underneath breasts and underneath the abdominal fold, mild.  Ulcers to left and right foot, see pictures below. LUE AVF + thrill and bruit   Psychiatric: Normal mood and affect. Behavior is normal.      Lab Results   Component Value Date    INR 2.7 (H) 06/19/2019    INR 3.3 (H) 06/18/2019    INR 2.6 (H) 06/17/2019     Lab Results   Component Value Date    CALCIUM 8.7 06/17/2019    PHOS 1.9 (L) 06/19/2019       Assessment and Plan:    Hypophosphatemia  ESRD on hemodialysis  - HD per nephro Betina and Sat schedule  - HD access: LUE AVF  - Outpatient HD unit: [facility]   -Nephrologist: Patient does not know  - biweekly renal labs   - continue cinacalcet 30 mg with breakfast, sevelamer carbonate 1600 mg t.i.d. with meals  -Patient on Midodrine TID for low BP   - 6/17 holding Sevelamer due to low phos of 1.5.   -6/18 Patient had HD today, Sevelamer held yesterday due to low phos. Initiate Phos level tomorrow morning to f/u on low phos. Nursing also reporting that they are intermittently having to hold  Midodrine due to elevated BP's. Initiate hold parameter orders today to ensure accurate dosing.  -6/19 Lab work reviewed, repeat phosphorus level ,done due to low level and phos binder stopped. Phos level is low at 1.9, dialysis to adjust with HD tomorrow, but improved from previous level.    Paroxysmal A-fib  Long-term anticoagulation- Goal INR 2-3   - INR 1.6, 2.2, 3.1, 4.7, 2.8 over last 5 days. coumadin held for last two days due to supra therapeutic levels after being given a boost in doing when INR was subtherapeutic for 4 days (6/4- 6/7)    - 6/12 initiated home dosing of warfarin 5mg on Tues, Thurs, Sat; 2.5mg Mon, Wed, Fri   -6/18 Initiate hold of Coumadin today for an elevated INR of 3.3, will repeat tomorrow and titrate appropriately  -6/19 INR 2.7, adjusted dose of Coumadin for today per pharm D. Coumadin today for an elevated INR. Will repeat tomorrow INR and titrate appropriately.    CONTINUE    Leukocytosis  - 6/13 patient has had a steadily rising leukocytosis since 6/4.  Today WBCs 17.88 from 16.97.  This is likely due to long-term steroid use.  Patient was on the wrong dose of prednisone during [facility] admission.  Prednisone increased to 20 mg daily yesterday.  Will continue to trend WBC biweekly.  Patient remains afebrile  - 6/17 increased to 18.19    Transaminitis  - Suspect secondary to sepsis, has been downtrending. Biliary duct appeared normal on CT abd   - on 6/10 AST/ALT 36/48.    - 6/12 CMP ordered- Will continue to trend until normal range  - 6/17 AST/ALT normalized to 39/35    Klebsiella oxytoca and Streptococcus salivarius bacteremia  - BCx positive on 5/30. Repeat BCx NGTD on 5/31  - IV ceftriaxone 2g q24hrs x2 weeks per ID - end of therapy 6/14/2019   - midline catheter in place  - no source identified, most suspicious for GI source and possible translocation in patient on chronic steroids     Debility   - Continue with PT/OT for gait training and strengthening and restoration of ADL's   -  Encourage mobility, OOB in chair, and early ambulation as appropriate  - Fall precautions   - Monitor for bowel and bladder dysfunction  - Monitor for and prevent skin breakdown and pressure ulcers  - Continue DVT prophylaxis with coumadin      Wound of 1st toe and right skin  - Wound care consulted and has now signed off   - The left great toe has a resolving wound- partial thickness skin tear- ~ 0.5 cm x 0.5 cm x 0.  Cleansed with sterile normal saline and foam dressing applied over area.   - The right shin has a partial thickness skin tear over scar tissue.  Cleansed with sterile normal saline/foam dressing in place.   - pt see Dr. Woodson for podiatry- told pt to set up apt post DC     Acute on chronic combined systolic and diastolic heart failure  - Echo 3/11/19: EF 35% with diastolic dysfunction  - continue aspirin 81mg daily, Plavix 75 mg daily, atorvastatin 80mg  - not on GDMT; BP too low chronically for ACE, BB, etc      Diabetes mellitus, type 2  -  Takes tradjenta 5mg daily at home. On 2units detemir here  - last A1c from 5/30 was 6.5     Secondary adrenal insufficiency  - continue fludrocortisone 100 mcg b.i.d., midodrine 10 mg t.i.d.    Epilepsy  - continue keppra 500mg bid     Pulmonary sarcoidosis  - On 4L O2 chronically  - 6/12 increased prednisone to 20mg daily, per Dr. Altamirano's note and pt states she takes 20mg at home.  - will need outpatient follow up with pulmonology     Apt post DC:  Pulmonary, Podiatry, Nephrology    Future Appointments   Date Time Provider Department Center   7/1/2019  9:00 AM Annette Hernandez NP [facility] ENDODIA [facility]   7/2/2019  8:00 AM HOME MONITOR DEVICE CHECK, [facility]   7/5/2019  9:00 AM Carlos A Caputo MD [facility] IM [facility] PCW   9/20/2019 10:00 AM [facility] EMG PROCEDURAL LAB [facility] NEURO [facility]   10/1/2019  8:00 AM HOME MONITOR DEVICE CHECK, [facility] Sandy NP      DOS: 6/19/2019

## 2019-06-19 NOTE — PT/OT/SLP PROGRESS
"Occupational Therapy  Treatment    Sisi Medel   MRN: [id number]   Admitting Diagnosis: Acute and chronic respiratory failure with hypoxia    OT Date of Treatment: 06/19/19       Billable Minutes:  Self Care/Home Management 30, Therapeutic Activity 15 and Therapeutic Exercise 15    General Precautions: Standard, fall, seizure(renal diet )  Orthopedic Precautions: N/A  Braces: N/A    Spiritual, Cultural Beliefs, Jewish Practices, Values that Affect Care: no    Subjective:  Communicated with patient prior to session.  "I'm not feeling this today."     Pain/Comfort  Pain Rating 1: 0/10  Pain Rating Post-Intervention 1: 0/10    Objective:   Patient found L side lying with head of bed slightly elevated.     Occupational Performance:    Bed Mobility:    · Patient completed Supine to Sit with stand by assistance     Functional Mobility/Transfers:  · Patient completed Sit <> Stand Transfer with minimum assistance of two with  rolling walker   · Patient completed Bed > wheelchair Transfer using Stand Pivot technique with stand by assistance with no assistive device    Activities of Daily Living:  · Bathing: stand by assistance bathing seated in w/c at sink level  · Upper Body Dressing: set up assistance callie/doff gowns    [facility] 6 Click:  [facility] Total Score: 16    OT Exercises: UE Ergometer 15 minutes with minimum resistance to icnrease endurance needed for ADLs.     Additional Treatment:  Patient stood at table top level with rolling walker and CGA/SBA  ~8 minutes while copying patterns using small pegs and playing dominos requiring fine motor skills in preparation of self care tasks.    Patient left up in chair in rehab gym for PT.    ASSESSMENT:  Sisi Medel is a 58 y.o. female with a medical diagnosis of Acute and chronic respiratory failure with hypoxia and presents with the deficits listed below. Patient was resistive to therapy in beginning of session. Patient deferred donning pants and changing " socks this session. Patient noted to become fearful when attempting to stand and wants two people to help her stand. Patient will benefit from continued OT services to increase independence with ADLs.     Rehab identified problem list/impairments: impaired endurance, impaired self care skills, impaired functional mobilty    Rehab potential is fair    Activity tolerance: Fair    Discharge recommendations:       Barriers to discharge: None     Equipment recommendations: (TBD as pt. progresses)     GOALS:   Multidisciplinary Problems     Occupational Therapy Goals        Problem: Occupational Therapy Goal    Goal Priority Disciplines Outcome Interventions   Occupational Therapy Goal     OT, PT/OT Ongoing (interventions implemented as appropriate)    Description:  Goals to be met by: 06-24-19      Therapy to be on MWF and SUN (4x/week) addended 06-13-`19    Patient will increase functional independence with ADLs by performing:    UE Dressing with Set-up Assistance. GOAL MET 06/16/19  LE Dressing with Supervision.  Grooming while standing with Supervision.  Toileting from bedside commode with Supervision for hygiene and clothing management.   Bathing from  edge of bed with Supervision.  Supine to sit with Modified [place]. GOAL MET 06/16/19  Stand pivot transfers with Supervision.  Toilet transfer to bedside commode with Supervision.  Pt. To be I with HEP for BUE to improve level of endurance  Pt. To engage in dynamic standing activity x 10 minutes with S                        Plan:  Patient to be seen 4 x/week(M, W, F and Sun) to address the above listed problems via self-care/home management, therapeutic activities, therapeutic exercises  Plan of Care expires: 07/11/19  Plan of Care reviewed with: patient    LA NENA Muse  06/19/2019

## 2019-06-19 NOTE — PLAN OF CARE
Problem: Physical Therapy Goal  Goal: Physical Therapy Goal  Goals to be met by: 19     Patient will increase functional independence with mobility by performin. Supine to sit with Set-up [place]. Not met  2. Sit to supine with Stand-by Assistance. Not met  3. Sit to stand transfer with Stand-by Assistance. Not met  4. Bed to chair transfer with Stand-by Assistance using Rolling Walker. Not met  5. Gait  x 100 feet with Stand-by Assistance using Rolling Walker.  Not met  6. Ascend/descend 5 stair with right OR left Handrails Contact Guard Assistance. Not met  7. Stand for 2 minutes with Stand-by Assistance using Rolling Walker while performing activity.  REVISED: Stand for 3 min c/ Stand-by Assistance using Rolling Walker while performing activity.        Outcome: Ongoing (interventions implemented as appropriate)  Goals remain appropriate

## 2019-06-19 NOTE — PLAN OF CARE
Problem: Adult Inpatient Plan of Care  Goal: Plan of Care Review  Outcome: Ongoing (interventions implemented as appropriate)  Pt repositioned, safety measures in place, will continue to monitor

## 2019-06-19 NOTE — PLAN OF CARE
Problem: Occupational Therapy Goal  Goal: Occupational Therapy Goal  Goals to be met by: 06-24-19      Therapy to be on MWF and SUN (4x/week) addended 06-13-`19    Patient will increase functional independence with ADLs by performing:    UE Dressing with Set-up Assistance. GOAL MET 06/16/19  LE Dressing with Supervision.  Grooming while standing with Supervision.  Toileting from bedside commode with Supervision for hygiene and clothing management.   Bathing from  edge of bed with Supervision.  Supine to sit with Modified [place]. GOAL MET 06/16/19  Stand pivot transfers with Supervision.  Toilet transfer to bedside commode with Supervision.  Pt. To be I with HEP for BUE to improve level of endurance  Pt. To engage in dynamic standing activity x 10 minutes with S       Outcome: Ongoing (interventions implemented as appropriate)  Patient goals are appropriate.

## 2019-06-19 NOTE — PT/OT/SLP PROGRESS
"Physical Therapy  Treatment    Sisi Medel   MRN: [id number]   Admitting Diagnosis: Acute and chronic respiratory failure with hypoxia    PT Received On: 06/19/19        Billable Minutes:  Gait Training 25, Therapeutic Activity 0 and Therapeutic Exercise 13    Treatment Type: Treatment  PT/PTA: PTA     PTA Visit Number: 1       General Precautions: Standard, fall, seizure  Orthopedic Precautions: N/A   Braces: N/A    Spiritual, Cultural Beliefs, Adventism Practices, Values that Affect Care: no    Subjective:  "OK"    Pain/Comfort  Pain Rating 1: 0/10  Pain Rating Post-Intervention 1: 0/10    Objective:  Patient found with: oxygen 3 L     AM-PAC 6 CLICK MOBILITY  Total Score:17    Transfers:  Sit<>Stand: min A vcs for tech    Gait:  Amb with RW CGA ~ 38 ft, 66 ft and 42 ft wc/02 in tow all with seated rest breaks, SOB , 02 sats drop to 77%-79% ~ 2:30 sec to return to 92% however with vcs to focus on breathing during gait and to take occ std'g rest breaks 02 sat did not drop as much, pt all appears to get anxious    Therex:  2x10 reps AP,GS,LAQ,hip flex,abd/add    Patient left up in chair with all lines intact, call button in reach and belongings in reach.    Assessment:  Sisi Medel is a 58 y.o. female with a medical diagnosis of Acute and chronic respiratory failure with hypoxia.  Pt tolerated well, pt would continue to benefit from skilled PT services to improve overall functional mobility, strength and endurance.    Rehab identified problem list/impairments: weakness, impaired endurance, impaired self care skills, impaired functional mobilty, gait instability, impaired balance, decreased lower extremity function    Rehab potential is good.    Activity tolerance: Fair    Discharge recommendations: home with home health     Barriers to discharge: Inaccessible home environment    Equipment recommendations: tub bench     GOALS:   Multidisciplinary Problems     Physical Therapy Goals        Problem: " Physical Therapy Goal    Goal Priority Disciplines Outcome Goal Variances Interventions   Physical Therapy Goal     PT, PT/OT Ongoing (interventions implemented as appropriate)     Description:  Goals to be met by: 19     Patient will increase functional independence with mobility by performin. Supine to sit with Set-up [place]. Not met  2. Sit to supine with Stand-by Assistance. Not met  3. Sit to stand transfer with Stand-by Assistance. Not met  4. Bed to chair transfer with Stand-by Assistance using Rolling Walker. Not met  5. Gait  x 100 feet with Stand-by Assistance using Rolling Walker.  Not met  6. Ascend/descend 5 stair with right OR left Handrails Contact Guard Assistance. Not met  7. Stand for 2 minutes with Stand-by Assistance using Rolling Walker while performing activity.  REVISED: Stand for 3 min c/ Stand-by Assistance using Rolling Walker while performing activity.                         PLAN:    Patient to be seen 4 x/week(Mon/Wed/Fri/Sun)  to address the above listed problems via gait training, therapeutic activities, therapeutic exercises  Plan of Care expires: 19  Plan of Care reviewed with: patient    Becky Christiansonkaushik, PTA  2019

## 2019-06-20 NOTE — PROGRESS NOTES
"Patient woke up screaming, Patient kept staing " help me help me ". Patient stated she had a bad dream. It has been reported by other staff members that this isnt the first time the patient has had bad dreams since being on the unit. Will pass on in report and make NP aware.   "

## 2019-06-20 NOTE — PROGRESS NOTES
Health Maintenance Due   Topic Date Due    Lipid Panel  12/08/2018    DEXA SCAN  04/07/2019     Shingles vaccine due.

## 2019-06-20 NOTE — PLAN OF CARE
Problem: Adult Inpatient Plan of Care  Goal: Plan of Care Review  Outcome: Ongoing (interventions implemented as appropriate)     06/20/19 0534   Plan of Care Review   Plan of Care Reviewed With patient       Problem: Fall Injury Risk  Goal: Absence of Fall and Fall-Related Injury    Intervention: Promote Injury-Free Environment     06/20/19 0534   Optimize [place] and Functional Mobility   Environmental Safety Modification assistive device/personal items within reach   Optimize Balance and Safe Activity   Safety Promotion/Fall Prevention lighting adjusted;medications reviewed;instructed to call staff for mobility

## 2019-06-20 NOTE — PROGRESS NOTES
[facility]                                  Skilled Nursing Facility                   Progress Note     Admit Date: 6/10/2019  JACKELYN TBD   Principal Problem:  Acute and chronic respiratory failure with hypoxia   HPI obtained from patient interview and chart review     Visit Date: 6/20/2019    Chief Complaint:  Revaluation of medical treatment and therapy status: Lab review, Coumadin dosing    HPI:   Ms. Medel is a 58 year old female with PMHx of pulmonary sarcoidosis on 3-5L home oxygen, Class 3 HFrEF (3/2019 EF 35%), perm Afib/flutter s/p 6/2017 AVN ablation & PPM, ESRD (HD TTS), Type 2 DM (3/2019 A1c 6.2%), pulmonary HTN, YOANDY, and orthostatic hypotension who presents to SNF following a hospitalization for fluid overload with respiratory decompensation and bacteremia.    Interval history:   Lab work from today reviewed, INR 2.3 continue current dose of Coumadin for today per pharm D. Will repeat tomorrow INR and titrate appropriately. WBC count decreased at 14.69, H&H 9/33, Na 132, K+3.5, BUN/Cr 74/6.7, phos 1.6 AST/ALT 30/29.   Patient progessing with PT/OT. Patient medically complex. Continuing to follow and treat all acute and chronic conditions.      Past Medical History: Patient has a past medical history of Anemia in ESRD (end-stage renal disease) (3/7/2016), Anticoagulant long-term use, Cervical radiculopathy (7/20/2015), CHF (congestive heart failure), Chronic combined systolic and diastolic heart failure (6/14/2013), Chronic respiratory failure with hypoxia (04/22/2013), Closed fracture of proximal end of right fibula (6/27/2016), Complications due to renal dialysis device, implant, and graft, DDD (degenerative disc disease), lumbar (6/17/2015), Dependence on renal dialysis, Diabetes mellitus type II, controlled (12/8/2017), ESRD on hemodialysis (2/23/2016), Essential hypertension, Gout, Lateral meniscal tear (5/31/2016), Lumbar  stenosis (2015), Obesity, Class III, BMI 40-49.9 (morbid obesity), Pacemaker, Paroxysmal atrial fibrillation (2014), Peripheral neuropathy (2013), Personal history of gastric ulcer (3/19/2013), Pneumonia of left lower lobe due to infectious organism (3/10/2019), Sarcoidosis, lung (), Secondary pulmonary hypertension (2015), Seizure disorder (3/19/2013), Shingles (2013), and Thyroid disease.    Past Surgical History: Patient has a past surgical history that includes Abdominal surgery;  section; OTHER SURGICAL HISTORY; Vascular surgery; stent to fistula; Cardiac pacemaker placement; Fistulogram (Left, 2019); Percutaneous transluminal angioplasty of arteriovenous fistula (N/A, 2019); Fistulogram (Left, 2019); and Fistulogram (Left, 2019).    Social History: Patient reports that she quit smoking about 25 years ago. Her smoking use included cigarettes. She has a 5.00 pack-year smoking history. She has never used smokeless tobacco. She reports that she does not drink alcohol or use drugs.    Family History: family history includes Coronary artery disease in her father; Diabetes in her father, maternal grandmother, and mother; Hypertension in her father and mother; Kidney failure in her mother; Lupus in her sister.    Allergies: Patient is allergic to bactrim [sulfamethoxazole-trimethoprim] and nsaids (non-steroidal anti-inflammatory drug).    ROS  Constitutional: Negative for fever. + fatigue.   Eyes: Negative for blurred vision, double vision and discharge.   Respiratory: + for cough, shortness of breath, baseline. Neg for wheezing.    Cardiovascular: Negative for chest pain, palpitations, claudication, + leg swelling.   Gastrointestinal: Negative for abdominal pain, constipation, diarrhea, nausea and vomiting.   Genitourinary: Negative for dysuria, frequency and urgency.   Musculoskeletal:  + generalized weakness, gait instability. Negative for back pain and  myalgias.   Skin: Negative for itching and rash.   Neurological: Negative for dizziness, speech change, and headaches.   Psychiatric/Behavioral: Negative for depression. The patient is not nervous/anxious.      PEx  Temp:  [96.5 °F (35.8 °C)-97.1 °F (36.2 °C)]   Pulse:  [69-70]   Resp:  [20]   BP: (114-131)/(74-89)   SpO2:  [96 %-100 %]      Constitutional: Patient obese and in no distress   Head: Normocephalic and atraumatic.   Eyes: Pupils are equal, round, and reactive to light.   Neck: Normal range of motion. Neck supple.   Cardiovascular: Normal rate, regular rhythm and normal heart sounds.    Pulmonary/Chest: Effort normal and breath sounds are clear, O2 via NC   Abdominal: Soft. Bowel sounds are normal.   Musculoskeletal: Normal range of motion.   Neurological: Alert and oriented to person, place, and time.   Skin: Skin is warm and dry.  Moisture associated dermatitis noted to underneath breasts and underneath the abdominal fold, mild.  Ulcers to left and right foot, see pictures below. LUE AVF + thrill and bruit   Psychiatric: Normal mood and affect. Behavior is normal.      Lab Results   Component Value Date    INR 2.3 (H) 06/20/2019    INR 2.7 (H) 06/19/2019    INR 3.3 (H) 06/18/2019     Lab Results   Component Value Date    CALCIUM 8.7 06/20/2019    PHOS 1.6 (L) 06/20/2019       Assessment and Plan:    Hypophosphatemia  ESRD on hemodialysis  - HD per nephro Betina and Sat schedule  - HD access: LUE AVF  - Outpatient HD unit: [facility]   -Nephrologist: Patient does not know  - biweekly renal labs   - continue cinacalcet 30 mg with breakfast, sevelamer carbonate 1600 mg t.i.d. with meals  -Patient on Midodrine TID for low BP   - 6/17 holding Sevelamer due to low phos of 1.5.   -6/18 Patient had HD today, Sevelamer held yesterday due to low phos. Initiate Phos level tomorrow morning to f/u on low phos. Nursing also reporting that they are intermittently having to hold Midodrine due to elevated BP's.  Initiate hold parameter orders today to ensure accurate dosing.  -6/19 Lab work reviewed, repeat phosphorus level ,done due to low level and phos binder stopped. Phos level is low at 1.9, dialysis to adjust with HD tomorrow, but improved from previous level.  -6/20 Lab work reviewed, WBC count decreased at 14.69, H&H 9/33, Na 132, K+3.5, BUN/Cr 74/6.7, phos 1.6 AST/ALT 30/29.    Paroxysmal A-fib  Long-term anticoagulation- Goal INR 2-3   - INR 1.6, 2.2, 3.1, 4.7, 2.8 over last 5 days. coumadin held for last two days due to supra therapeutic levels after being given a boost in doing when INR was subtherapeutic for 4 days (6/4- 6/7)    - 6/12 initiated home dosing of warfarin 5mg on Tues, Thurs, Sat; 2.5mg Mon, Wed, Fri   -6/18 Initiate hold of Coumadin today for an elevated INR of 3.3, will repeat tomorrow and titrate appropriately  -6/19 INR 2.7, adjusted dose of Coumadin for today per pharm D. Coumadin today for an elevated INR. Will repeat tomorrow INR and titrate appropriately.  -6/20 Lab work from today reviewed, INR 2.3 continue current dose of Coumadin for today per pharm D. Will repeat tomorrow INR and titrate appropriately.     Leukocytosis  - 6/13 patient has had a steadily rising leukocytosis since 6/4.  Today WBCs 17.88 from 16.97.  This is likely due to long-term steroid use.  Patient was on the wrong dose of prednisone during [facility] admission.  Prednisone increased to 20 mg daily yesterday.  Will continue to trend WBC biweekly.  Patient remains afebrile  - 6/17 increased to 18.19  - 6/20 WBC count 14 (18)    Transaminitis  - Suspect secondary to sepsis, has been downtrending. Biliary duct appeared normal on CT abd   - on 6/10 AST/ALT 36/48.    - 6/12 CMP ordered- Will continue to trend until normal range  - 6/17 AST/ALT normalized to 39/35  -6/20 30/29    Continue     Klebsiella oxytoca and Streptococcus salivarius bacteremia  - BCx positive on 5/30. Repeat BCx NGTD on 5/31  - IV ceftriaxone 2g q24hrs x2  weeks per ID - end of therapy 6/14/2019   - midline catheter in place  - no source identified, most suspicious for GI source and possible translocation in patient on chronic steroids     Debility  - Continue with PT/OT for gait training and strengthening and restoration of ADL's   - Encourage mobility, OOB in chair, and early ambulation as appropriate  - Fall precautions   - Monitor for bowel and bladder dysfunction  - Monitor for and prevent skin breakdown and pressure ulcers  - Continue DVT prophylaxis with coumadin      Wound of 1st toe and right skin  - Wound care consulted and has now signed off   - The left great toe has a resolving wound- partial thickness skin tear- ~ 0.5 cm x 0.5 cm x 0.  Cleansed with sterile normal saline and foam dressing applied over area.   - The right shin has a partial thickness skin tear over scar tissue.  Cleansed with sterile normal saline/foam dressing in place.   - pt see Dr. Woodson for podiatry- told pt to set up apt post DC     Acute on chronic combined systolic and diastolic heart failure  - Echo 3/11/19: EF 35% with diastolic dysfunction  - continue aspirin 81mg daily, Plavix 75 mg daily, atorvastatin 80mg  - not on GDMT; BP too low chronically for ACE, BB, etc      Diabetes mellitus, type 2  - Takes tradjenta 5mg daily at home. On 2units detemir here  - last A1c from 5/30 was 6.5     Secondary adrenal insufficiency  - continue fludrocortisone 100 mcg b.i.d., midodrine 10 mg t.i.d.    Epilepsy  - continue keppra 500mg bid     Pulmonary sarcoidosis  - On 4L O2 chronically  - 6/12 increased prednisone to 20mg daily, per Dr. Altamirano's note and pt states she takes 20mg at home.  - will need outpatient follow up with pulmonology     Apt post DC:  Pulmonary, Podiatry, Nephrology    Future Appointments   Date Time Provider Department Center   7/1/2019  9:00 AM Annette S. Sehon, NP [facility] ENDODIA [address]   7/2/2019  8:00 AM HOME MONITOR DEVICE CHECK, [facility] JOCELYN [place]   7/5/2019   9:00 AM Carlos A Caputo MD [facility] IM [address]   9/20/2019 10:00 AM [facility] EMG PROCEDURAL LAB [facility] NEURO [address]   10/1/2019  8:00 AM HOME MONITOR DEVICE CHECK, [facility] AIDEN Sandy, NP

## 2019-06-21 NOTE — PT/OT/SLP PROGRESS
Occupational Therapy  Treatment    Sisi Medel   MRN: [id number]   Admitting Diagnosis: Acute and chronic respiratory failure with hypoxia    OT Date of Treatment: 06/21/19       Billable Minutes:  Self Care/Home Management 15, Therapeutic Activity 10 and Therapeutic Exercise 20    General Precautions: Standard, fall, seizure  Orthopedic Precautions: N/A  Braces: N/A    Spiritual, Cultural Beliefs, Latter day Practices, Values that Affect Care: no    Subjective:  Communicated with nurse prior to session.      Pain/Comfort  Pain Rating 1: 0/10  Pain Rating Post-Intervention 1: 0/10    Objective:  Patient found with: oxygen    Occupational Performance:    Bed Mobility:    · Patient completed Supine to Sit with modified independence     Functional Mobility/Transfers:  · Patient completed Sit <> Stand Transfer with minimum assistance  with  rolling walker   · Patient completed Bed <> Chair Transfer using Step Transfer technique with minimum assistance with rolling walker  · Functional Mobility: Pt ambulated with RW and SBA a distance of 52ft from her room toward therapy gym but became fatigued and needed to sit to rest.    Activities of Daily Living:  · Lower Body Dressing: stand by assistance Pt able to don/doff socks but had not pants available to attempt to perform donning/doffing.    [facility] 6 Click:  AMPA Total Score: 19    OT Exercises: UE Ergometer performed 16 minutes on UBE with Mod resistance. UE exercises performed to increase functional endurance and strength.  Strengthening required in order increase independence when performing self care tasks, W/C propulsion , functional standing activities as well as when performing functional tasks.      Additional Treatment:  Pt worked on functional standing activity consisting of standing with RW while reaching in all planes , crossing of midline and reaching to varying heights to facilitate (B) wt shifting and stability in standing to increase (I)ce when  performing self care, and functional activities with standing component.  Pt tolerated up to 5 Min. 25 sec in standing with SBA  and RW to steady.    Patient left up in chair with all lines intact, call button in reach and nurse notified    ASSESSMENT:  Sisi Medel is a 58 y.o. female with a medical diagnosis of Acute and chronic respiratory failure with hypoxia . She is making progress but requires V/Cs to properly and safely perform activities to completion.  She continues to present with deficits affecting (I)ce with functional transfers, functional standing balance and self care tasks with standing component.   OT continues to be recommended to further her functional (I)ce and safety. Goals remain appropriate and continued OT is recommended.      Rehab identified problem list/impairments: impaired endurance, impaired self care skills, impaired functional mobilty    Rehab potential is good    Activity tolerance: Good    Discharge recommendations:       Barriers to discharge: None     Equipment recommendations: (TBD as pt. progresses)     GOALS:   Multidisciplinary Problems     Occupational Therapy Goals        Problem: Occupational Therapy Goal    Goal Priority Disciplines Outcome Interventions   Occupational Therapy Goal     OT, PT/OT Ongoing (interventions implemented as appropriate)    Description:  Goals to be met by: 06-24-19      Therapy to be on MWF and SUN (4x/week) addended 06-13-`19    Patient will increase functional independence with ADLs by performing:    UE Dressing with Set-up Assistance. GOAL MET 06/16/19  LE Dressing with Supervision.  Grooming while standing with Supervision.  Toileting from bedside commode with Supervision for hygiene and clothing management.   Bathing from  edge of bed with Supervision.  Supine to sit with Modified [place]. GOAL MET 06/16/19  Stand pivot transfers with Supervision.  Toilet transfer to bedside commode with Supervision.  Pt. To be I with HEP for  BUE to improve level of endurance  Pt. To engage in dynamic standing activity x 10 minutes with S                        Plan:  Patient to be seen 4 x/week(M, W, F and Sun) to address the above listed problems via self-care/home management, therapeutic activities, therapeutic exercises  Plan of Care expires: 07/11/19  Plan of Care reviewed with: patient    Dong Roca, OTR/ANALILIA  06/21/2019

## 2019-06-21 NOTE — PLAN OF CARE
Problem: Physical Therapy Goal  Goal: Physical Therapy Goal  Goals to be met by: 19     Patient will increase functional independence with mobility by performin. Supine to sit with Set-up [place]. Not met  2. Sit to supine with Stand-by Assistance. Not met  3. Sit to stand transfer with Stand-by Assistance. Not met  4. Bed to chair transfer with Stand-by Assistance using Rolling Walker. Not met  5. Gait  x 100 feet with Stand-by Assistance using Rolling Walker.  Not met  6. Ascend/descend 5 stair with right OR left Handrails Contact Guard Assistance. Not met  7. Stand for 2 minutes with Stand-by Assistance using Rolling Walker while performing activity.  REVISED: Stand for 3 min c/ Stand-by Assistance using Rolling Walker while performing activity.        Outcome: Ongoing (interventions implemented as appropriate)  Goals remain appropriate

## 2019-06-21 NOTE — PLAN OF CARE
Problem: Adult Inpatient Plan of Care  Goal: Plan of Care Review  Outcome: Ongoing (interventions implemented as appropriate)     06/21/19 0506   Plan of Care Review   Plan of Care Reviewed With patient       Problem: Fall Injury Risk  Goal: Absence of Fall and Fall-Related Injury    Intervention: Promote Injury-Free Environment     06/21/19 0506   Optimize [place] and Functional Mobility   Environmental Safety Modification assistive device/personal items within reach   Optimize Balance and Safe Activity   Safety Promotion/Fall Prevention lighting adjusted;medications reviewed;instructed to call staff for mobility;Fall Risk reviewed with patient/family

## 2019-06-21 NOTE — PLAN OF CARE
Problem: Occupational Therapy Goal  Goal: Occupational Therapy Goal  Goals to be met by: 06-24-19      Therapy to be on MWF and SUN (4x/week) addended 06-13-`19    Patient will increase functional independence with ADLs by performing:    UE Dressing with Set-up Assistance. GOAL MET 06/16/19  LE Dressing with Supervision.  Grooming while standing with Supervision.  Toileting from bedside commode with Supervision for hygiene and clothing management.   Bathing from  edge of bed with Supervision.  Supine to sit with Modified [place]. GOAL MET 06/16/19  Stand pivot transfers with Supervision.  Toilet transfer to bedside commode with Supervision.  Pt. To be I with HEP for BUE to improve level of endurance  Pt. To engage in dynamic standing activity x 10 minutes with S       Outcome: Ongoing (interventions implemented as appropriate)  Dong Roca OTR/L      6/21/2019

## 2019-06-21 NOTE — PT/OT/SLP PROGRESS
"Physical Therapy  Treatment    Sisi Medel   MRN: [id number]   Admitting Diagnosis: Acute and chronic respiratory failure with hypoxia    PT Received On: 06/21/19        Billable Minutes:  Gait Training 19, Therapeutic Activity 15 and Therapeutic Exercise 15    Treatment Type: Treatment  PT/PTA: PTA     PTA Visit Number: 2       General Precautions: Standard, fall, seizure  Orthopedic Precautions: N/A   Braces: N/A    Spiritual, Cultural Beliefs, Jainism Practices, Values that Affect Care: no    Subjective:  "good"    Pain/Comfort  Pain Rating 1: 0/10  Pain Rating Post-Intervention 1: 0/10    Objective:   Patient found with: oxygen(3L)     AM-PAC 6 CLICK MOBILITY  Total Score:17    Bed Mobility:  Sit>Supine:SBA/S HOB slightly elev with pillows, no rail    Transfers:  Sit<>Stand: with RW min A vcs for tech and using momentum  Stand Pivot Transfer: with RW CGA WC>EOB    Gait:  Amb with RW CGA ~ 85 ft, 95 ft, 95 ft with seated rest breaks, 1 brief std'g rest break each trial w02 in tow     Therex:  2x10 reps AP,GS,LAQ,hip flex,abd/add    Additional Treatment:  02 sats monitored  At rest on 3L 94-98%  After gait 83%-87% HR 70-83bpm taking ~1-2 min for 02 sats to return to 92%>    Patient left up in chair with all lines intact, call button in reach and belongings in reach.    Assessment:  Sisi Medel is a 58 y.o. female with a medical diagnosis of Acute and chronic respiratory failure with hypoxia.  Pt tolerated well, improvement noted with trfs and gait stability, able to inc gait distance with 02 sats not dropping as much and less time t to recover to 92%, pt would continue to benefit from skilled PT services to improve overall functional mobility, strength and endurance.  .    Rehab identified problem list/impairments: weakness, impaired endurance, impaired self care skills, impaired functional mobilty, gait instability, impaired balance, decreased lower extremity function    Rehab potential is " good.    Activity tolerance: Fair    Discharge recommendations: home with home health     Barriers to discharge: Inaccessible home environment    Equipment recommendations: tub bench     GOALS:   Multidisciplinary Problems     Physical Therapy Goals        Problem: Physical Therapy Goal    Goal Priority Disciplines Outcome Goal Variances Interventions   Physical Therapy Goal     PT, PT/OT Ongoing (interventions implemented as appropriate)     Description:  Goals to be met by: 19     Patient will increase functional independence with mobility by performin. Supine to sit with Set-up [place]. Not met  2. Sit to supine with Stand-by Assistance. Not met  3. Sit to stand transfer with Stand-by Assistance. Not met  4. Bed to chair transfer with Stand-by Assistance using Rolling Walker. Not met  5. Gait  x 100 feet with Stand-by Assistance using Rolling Walker.  Not met  6. Ascend/descend 5 stair with right OR left Handrails Contact Guard Assistance. Not met  7. Stand for 2 minutes with Stand-by Assistance using Rolling Walker while performing activity.  REVISED: Stand for 3 min c/ Stand-by Assistance using Rolling Walker while performing activity.                         PLAN:    Patient to be seen 4 x/week(Mon/Wed/Fri/Sun)  to address the above listed problems via gait training, therapeutic activities, therapeutic exercises  Plan of Care expires: 19  Plan of Care reviewed with: patient    Becky Brooks, PTA  2019

## 2019-06-21 NOTE — PROGRESS NOTES
"[facility] - Skilled Nursing Care  Adult Nutrition  Progress Note    SUMMARY   Recommendations    Recommendation/Intervention: Continue renal diabetic diet, fluid restriciton per MD, continue novasource renal for protein TID  Nutrition Goal Status: goal met    Reason for Assessment    Reason For Assessment: RD follow-up  Interdisciplinary Rounds: attended  General Information Comments: pt taking all 3 novasource renals, pt reports wounds healing, wt stable despite dialysis    Nutrition Risk Screen    Nutrition Risk Screen: large or nonhealing wound, burn or pressure injury    Nutrition/Diet History    Spiritual, Cultural Beliefs, Zoroastrian Practices, Values that Affect Care: no    Anthropometrics    Temp: 97.5 °F (36.4 °C)  Height Method: Stated  Height: 5' 8" (172.7 cm)  Height (inches): 68 in  Weight Method: Standard Scale  Weight: 109 kg (240 lb 4.8 oz)  Weight (lb): 240.3 lb  Ideal Body Weight (IBW), Female: 140 lb  % Ideal Body Weight, Female (lb): 171.64 lb  BMI (Calculated): 36.6       Lab/Procedures/Meds    Pertinent Labs Reviewed: reviewed  Pertinent Labs Comments: Na 132, PO4 1.6, albumin 3.0  Pertinent Medications Reviewed: reviewed  Pertinent Medications Comments: stopped binders    Physical Findings/Assessment         Estimated/Assessed Needs                                   Nutrition Prescription Ordered    Current Diet Order: Renal , diabetic diet, 800 ml fluid restriciton   Oral Nutrition Supplement: Novasource renal TID    Evaluation of Received Nutrient/Fluid Intake    Energy Calories Required: meeting needs  Protein Required: meeting needs  Fluid Required: meeting needs  Comments: oral supplement is in addition to 800 ml restricton  Tolerance: tolerating  % Intake of Estimated Energy Needs: 75 - 100 %  % Meal Intake: 75 - 100 %    Nutrition Risk    Level of Risk/Frequency of Follow-up: low     Assessment and Plan    Malnutrition of moderate degree  Malnutrition in the context of Acute " Illness/Injury    Related to (etiology):  Hypermetabolism of acute diseas    Signs and Symptoms (as evidenced by):  Energy Intake: <75% of estimated energy requirement for > one month  Body Fat Depletion: mild depletion of orbitals   Muscle Mass Depletion: mild depletion of temples, clavicle region, interosseous muscle and lower extremities   Weight Loss: 8% x one month  Fluid Accumulation: mild    Interventions/Recommendations (treatment strategy):  [place] modified diet  Carbohydrate modified diet  Fluid Restricted diet- 800 ml + ONS  Protein modified diet    Nutrition Diagnosis Status:           Ongoing  Monitor and Evaluation      Food and Nutrient Intake: energy intake, food and beverage intake  Food and Nutrient Adminstration: diet order  Physical Activity and Function: nutrition-related ADLs and IADLs  Anthropometric Measurements: weight, weight change  Biochemical Data, Medical Tests and Procedures: inflammatory profile, lipid profile, glucose/endocrine profile, gastrointestinal profile, electrolyte and renal panel  Nutrition-Focused Physical Findings: overall appearance     Malnutrition Assessment       6/14/19  Malnutrition Type: acute illness or injury  Energy Intake: moderate energy intake          Energy Intake (Malnutrition): less than 75% for greater than 7 days  Muscle Mass (Malnutrition): mild depletion   Orbital Region (Subcutaneous Fat Loss): mild depletion  Upper Arm Region (Subcutaneous Fat Loss): well nourished  Thoracic and Lumbar Region: well nourished   [place] Region (Muscle Loss): mild depletion  Dorsal Hand (Muscle Loss): mild depletion  Posterior Calf Region (Muscle Loss): mild depletion            Severe Weight Loss (Malnutrition): greater than 5% in 1 month    Nutrition Follow-Up    RD Follow-up?: Yes

## 2019-06-22 NOTE — NURSING
On patient rounds, right foot dressing draining moderate amount of bloody drainage. Dressing changed at this time and explained to patient importance of calling for any pain or discomfort to RLE. Patient denies pain at this time, able to follow commands and wiggle toes. Awaiting result of PT/INR. Will maintain safety precautions and follow up as needed.

## 2019-06-22 NOTE — PROGRESS NOTES
Walked in patients room , patient found sitting at edge of bed holding towel on foot. Removed towel and saw heel was bleeding a good amount. Pressure dressing added to foot by RN and Charge nurse. Patients foot propped up. Will continue to monitor.

## 2019-06-22 NOTE — PLAN OF CARE
Problem: Adult Inpatient Plan of Care  Goal: Plan of Care Review  Outcome: Ongoing (interventions implemented as appropriate)     06/21/19 1906   Plan of Care Review   Plan of Care Reviewed With patient       Problem: Infection  Goal: Infection Symptom Resolution  Outcome: Ongoing (interventions implemented as appropriate)  Intervention: Prevent or Manage Infection     06/21/19 1906   Prevent or Manage Infection   Fever Reduction/Comfort Measures medication administered   Infection Management aseptic technique maintained         Problem: Fall Injury Risk  Goal: Absence of Fall and Fall-Related Injury  Outcome: Ongoing (interventions implemented as appropriate)  Intervention: Identify and Manage Contributors to Fall Injury Risk     06/21/19 1906   Manage Acute Allergic Reaction   Medication Review/Management medications reviewed   Identify and Manage Contributors to Fall Injury Risk   Self-Care Promotion independence encouraged         Comments: Patient monitored every 1 to 2 hours for pain and safety.  Safety maintained.  Blood glucose monitored  before meals and bedtime.  Patient instructed to call for assistance.Call  Light and persoanl items in reach.

## 2019-06-22 NOTE — PLAN OF CARE
Problem: Adult Inpatient Plan of Care  Goal: Plan of Care Review  Outcome: Ongoing (interventions implemented as appropriate)     06/22/19 0628   Plan of Care Review   Plan of Care Reviewed With patient       Problem: Fall Injury Risk  Goal: Absence of Fall and Fall-Related Injury    Intervention: Promote Injury-Free Environment     06/22/19 0628   Optimize [place] and Functional Mobility   Environmental Safety Modification assistive device/personal items within reach   Optimize Balance and Safe Activity   Safety Promotion/Fall Prevention lighting adjusted;medications reviewed;instructed to call staff for mobility

## 2019-06-22 NOTE — PROGRESS NOTES
Notified on call provider, pt scratched sole of right foot with metal back scratcher due to itching causing open wound. Wound is currently blooding through pressure dressing. Order given to continue to apply pressure dressing. Pt stable no signs or symptom of distress.

## 2019-06-23 NOTE — PROGRESS NOTES
Patient's right heel dressing saturated with blood.  Removed 2 abd pads and kerlex dressing saturated with blood . Wond cleaned with ns and  Patted dry.  Non .adherant gauze, abd pads and kerlex applied. [place] applied

## 2019-06-23 NOTE — PROGRESS NOTES
[facility]                                  Skilled Nursing Facility                   Progress Note     Admit Date: 6/10/2019  JACKELYN TBD   Principal Problem:  Acute and chronic respiratory failure with hypoxia   HPI obtained from patient interview and chart review     Visit Date: 6/24/2019    Chief Complaint: Bleeding right foot wound, Revaluation of medical treatment and therapy status: Lab review, Coumadin dosing/hold of mediction    HPI:   Ms. Medel is a 58 year old female with PMHx of pulmonary sarcoidosis on 3-5L home oxygen, Class 3 HFrEF (3/2019 EF 35%), perm Afib/flutter s/p 6/2017 AVN ablation & PPM, ESRD (HD TTS), Type 2 DM (3/2019 A1c 6.2%), pulmonary HTN, YOANDY, and orthostatic hypotension who presents to SNF following a hospitalization for fluid overload with respiratory decompensation and bacteremia.    Interval history: Lab work from today reviewed, WBC count 14.97, H&H 8.3/28.7 (9.9/33.4), slightly decreased, Na 133, stable, BUN/Cr 77/6.3, patient to go for HD tomorrow, Phos level 3.8, back in the normal range since HD replaced, Mg 2. Patient scratched foot with metal back scratcher over the weekend due to itching, caused a wound to form. Issues with bleeding, requiring pressure dressings per staff. Patient on Coumadin, causing increased bleeding times. INR 1.7 today, which is subtherapeutic, but in light of continued bleeding and drop in H&H, will initiate hold of Coumadin for today's dose. Will repeat tomorrow INR and titrate appropriately. MISTY ordered per call NP to manage wound, and patient instructed not to weight bear until seen by MISTY RN today for new consult. Will also initiate ambulatory consult to podiatry, Dr Woodson, as patient states she was seeing Podiatry weekly for foot care prior to admit.  Patient progessing with PT/OT. Patient medically complex. Continuing to follow and treat all acute and chronic  conditions.      Past Medical History: Patient has a past medical history of Anemia in ESRD (end-stage renal disease) (3/7/2016), Anticoagulant long-term use, Cervical radiculopathy (2015), CHF (congestive heart failure), Chronic combined systolic and diastolic heart failure (2013), Chronic respiratory failure with hypoxia (2013), Closed fracture of proximal end of right fibula (2016), Complications due to renal dialysis device, implant, and graft, DDD (degenerative disc disease), lumbar (2015), Dependence on renal dialysis, Diabetes mellitus type II, controlled (2017), ESRD on hemodialysis (2016), Essential hypertension, Gout, Lateral meniscal tear (2016), Lumbar stenosis (2015), Obesity, Class III, BMI 40-49.9 (morbid obesity), Pacemaker, Paroxysmal atrial fibrillation (2014), Peripheral neuropathy (2013), Personal history of gastric ulcer (3/19/2013), Pneumonia of left lower lobe due to infectious organism (3/10/2019), Sarcoidosis, lung (), Secondary pulmonary hypertension (2015), Seizure disorder (3/19/2013), Shingles (2013), and Thyroid disease.    Past Surgical History: Patient has a past surgical history that includes Abdominal surgery;  section; OTHER SURGICAL HISTORY; Vascular surgery; stent to fistula; Cardiac pacemaker placement; Fistulogram (Left, 2019); Percutaneous transluminal angioplasty of arteriovenous fistula (N/A, 2019); Fistulogram (Left, 2019); and Fistulogram (Left, 2019).    Social History: Patient reports that she quit smoking about 25 years ago. Her smoking use included cigarettes. She has a 5.00 pack-year smoking history. She has never used smokeless tobacco. She reports that she does not drink alcohol or use drugs.    Family History: family history includes Coronary artery disease in her father; Diabetes in her father, maternal grandmother, and mother; Hypertension in her father and mother;  Kidney failure in her mother; Lupus in her sister.    Allergies: Patient is allergic to bactrim [sulfamethoxazole-trimethoprim] and nsaids (non-steroidal anti-inflammatory drug).    ROS  Constitutional: Negative for fever. + fatigue.   Eyes: Negative for blurred vision, double vision and discharge.   Respiratory: - for cough, shortness of breath, baseline. Neg for wheezing.    Cardiovascular: Negative for chest pain, palpitations, claudication, + leg swelling.   Gastrointestinal: Negative for abdominal pain, constipation, diarrhea, nausea and vomiting.   Genitourinary: Negative for dysuria, frequency and urgency.   Musculoskeletal:  + generalized weakness, gait instability. Negative for back pain and myalgias.   Skin: Negative for itching and rash, + right foot wound.   Neurological: Negative for dizziness, speech change, and headaches.   Psychiatric/Behavioral: Negative for depression. The patient is not nervous/anxious.      PEx  Temp:  [97.4 °F (36.3 °C)-98.1 °F (36.7 °C)]   Pulse:  [70-71]   Resp:  [16-18]   BP: (106-117)/(67-75)   SpO2:  [98 %-100 %]      Constitutional: Patient obese and in no distress   Head: Normocephalic and atraumatic.   Eyes: Pupils are equal, round, and reactive to light.   Neck: Normal range of motion. Neck supple.   Cardiovascular: Normal rate, regular rhythm and normal heart sounds.    Pulmonary/Chest: Effort normal and breath sounds are clear, O2 via NC   Abdominal: Soft. Bowel sounds are normal.   Musculoskeletal: Normal range of motion.   Neurological: Alert and oriented to person, place, and time.   Skin: Skin is warm and dry.  Moisture associated dermatitis noted to underneath breasts and underneath the abdominal fold, mild.  Ulcers to left and right foot, + new bleeding R foot wound from scratch, currently in pressure dressing that is CDI. LUE AVF + thrill and bruit   Psychiatric: Normal mood and affect. Behavior is normal.      Lab Results   Component Value Date    INR 1.7 (H)  06/24/2019    INR 1.9 (H) 06/23/2019    INR 2.7 (H) 06/22/2019     Lab Results   Component Value Date    CALCIUM 7.8 (L) 06/24/2019    PHOS 3.8 06/24/2019       Assessment and Plan:    Right Heel wound/laceration- 6/22  -6/24  Patient scratched foot with metal back scratcher over the weekend due to itching, caused a wound to form. Issues with bleeding, requiring pressure dressings per staff. Patient on Coumadin, causing increased bleeding times. INR 1.7 today, which is subtherapeutic, but in light of continued bleeding and drop in H&H, will initiate hold of Coumadin for today's dose. Will repeat tomorrow INR and titrate appropriately. WC ordered per call NP to manage wound, and patient instructed not to weight bear until seen by WC RN today for new consult. Will also initiate ambulatory consult to podiatry, Dr Woodson, as patient states she was seeing Podiatry weekly for foot care prior to admit.     Hypophosphatemia  ESRD on hemodialysis  - HD per nephro Izahurj and Sat schedule  - HD access: [facility]  - Outpatient HD unit: [facility]   -Nephrologist: Patient does not know  - biweekly renal labs   - continue cinacalcet 30 mg with breakfast, sevelamer carbonate 1600 mg t.i.d. with meals  -Patient on Midodrine TID for low BP   - 6/17 holding Sevelamer due to low phos of 1.5.   -6/18 Patient had HD today, Sevelamer held yesterday due to low phos. Initiate Phos level tomorrow morning to f/u on low phos. Nursing also reporting that they are intermittently having to hold Midodrine due to elevated BP's. Initiate hold parameter orders today to ensure accurate dosing.  -6/19 Lab work reviewed, repeat phosphorus level ,done due to low level and phos binder stopped. Phos level is low at 1.9, dialysis to adjust with HD tomorrow, but improved from previous level.  -6/24 Lab work from today reviewed, WBC count 14.97, H&H 8.3/28.7 (9.9/33.4), slightly decreased, Na 133, stable, BUN/Cr 77/6.3, patient to go for HD tomorrow,  Phos level 3.8, back in the normal range since HD replaced, Mg 2, stable.     Paroxysmal A-fib  Long-term anticoagulation- Goal INR 2-3   - INR 1.6, 2.2, 3.1, 4.7, 2.8 over last 5 days. coumadin held for last two days due to supra therapeutic levels after being given a boost in doing when INR was subtherapeutic for 4 days (6/4- 6/7)    - 6/12 initiated home dosing of warfarin 5mg on Tues, Thurs, Sat; 2.5mg Mon, Wed, Fri   -6/18 Initiate hold of Coumadin today for an elevated INR of 3.3, will repeat tomorrow and titrate appropriately  -6/19 INR 2.7, adjusted dose of Coumadin for today per pharm D. Coumadin today for an elevated INR. Will repeat tomorrow INR and titrate appropriately.  -6/24 Lab work from today reviewed,  INR 1.7, will hold dose of Coumadin for today per pharm D due to r foot bleeding. Will repeat tomorrow INR and titrate appropriately.    Continue     Leukocytosis  - 6/13 patient has had a steadily rising leukocytosis since 6/4.  Today WBCs 17.88 from 16.97.  This is likely due to long-term steroid use.  Patient was on the wrong dose of prednisone during [facility] admission.  Prednisone increased to 20 mg daily yesterday.  Will continue to trend WBC biweekly.  Patient remains afebrile  - 6/17 increased to 18.19  - 6/20 WBC count 14 (18), downtrended, stable    Transaminitis  - Suspect secondary to sepsis, has been downtrending. Biliary duct appeared normal on CT abd   - on 6/10 AST/ALT 36/48.    - 6/12 CMP ordered- Will continue to trend until normal range  - 6/17 AST/ALT normalized to 39/35  -6/20 30/29, stable    Klebsiella oxytoca and Streptococcus salivarius bacteremia  - BCx positive on 5/30. Repeat BCx NGTD on 5/31  - IV ceftriaxone 2g q24hrs x2 weeks per ID - end of therapy 6/14/2019   - midline catheter in place  - no source identified, most suspicious for GI source and possible translocation in patient on chronic steroids     Debility  - Continue with PT/OT for gait training and strengthening and  restoration of ADL's   - Encourage mobility, OOB in chair, and early ambulation as appropriate  - Fall precautions   - Monitor for bowel and bladder dysfunction  - Monitor for and prevent skin breakdown and pressure ulcers  - Continue DVT prophylaxis with coumadin      Wound of 1st toe and right skin  - Wound care consulted and has now signed off   - The left great toe has a resolving wound- partial thickness skin tear- ~ 0.5 cm x 0.5 cm x 0.  Cleansed with sterile normal saline and foam dressing applied over area.   - The right shin has a partial thickness skin tear over scar tissue.  Cleansed with sterile normal saline/foam dressing in place.   - pt see Dr. Woodson for podiatry- told pt to set up apt post DC     Acute on chronic combined systolic and diastolic heart failure  - Echo 3/11/19: EF 35% with diastolic dysfunction  - continue aspirin 81mg daily, Plavix 75 mg daily, atorvastatin 80mg  - not on GDMT; BP too low chronically for ACE, BB, etc      Diabetes mellitus, type 2  - Takes tradjenta 5mg daily at home. On 2units detemir here  - last A1c from 5/30 was 6.5     Secondary adrenal insufficiency  - continue fludrocortisone 100 mcg b.i.d., midodrine 10 mg t.i.d.    Epilepsy  - continue keppra 500mg bid     Pulmonary sarcoidosis  - On 4L O2 chronically  - 6/12 increased prednisone to 20mg daily, per Dr. Altamirano's note and pt states she takes 20mg at home.  - will need outpatient follow up with pulmonology     Apt post DC:  Pulmonary, Podiatry, Nephrology    Future Appointments   Date Time Provider Department Center   7/1/2019  9:00 AM Annette Hernandez NP [facility] ENDODIA [facility]   7/2/2019  8:00 AM HOME MONITOR DEVICE CHECK, [facility] A[facility]   7/5/2019  9:00 AM Carlos A Caputo MD [facility] IM [facility] PCW   9/20/2019 10:00 AM [facility] EMG PROCEDURAL LAB [facility] NEURO [facility]   10/1/2019  8:00 AM HOME MONITOR DEVICE CHECK, [facility] AIDEN Sandy NP

## 2019-06-23 NOTE — PLAN OF CARE
Problem: Adult Inpatient Plan of Care  Goal: Plan of Care Review  Outcome: Ongoing (interventions implemented as appropriate)  Pt repositioned, skin and safety monitored, will continue to monitor

## 2019-06-23 NOTE — PLAN OF CARE
Problem: Occupational Therapy Goal  Goal: Occupational Therapy Goal  Goals to be met by: 06-24-19      Therapy to be on MWF and SUN (4x/week) addended 06-13-`19    Patient will increase functional independence with ADLs by performing:    UE Dressing with Set-up Assistance. GOAL MET 06/16/19  LE Dressing with Supervision.  Grooming while standing with Supervision.  Toileting from bedside commode with Supervision for hygiene and clothing management.   Bathing from  edge of bed with Supervision.  Supine to sit with Modified [place]. GOAL MET 06/16/19  Stand pivot transfers with Supervision.  Toilet transfer to bedside commode with Supervision.  Pt. To be I with HEP for BUE to improve level of endurance  Pt. To engage in dynamic standing activity x 10 minutes with S       Outcome: Ongoing (interventions implemented as appropriate)  Patient goals are appropriate.

## 2019-06-23 NOTE — NURSING
Notified IVETH Lucio via secure chat that rizwana's wound on right foot continues to bleed. Dressing change orders are being followed as ordered. No updated orders at this time. Will continue to change dressing as needed due to saturation. Safety precautions maintained.

## 2019-06-23 NOTE — PLAN OF CARE
Problem: Physical Therapy Goal  Goal: Physical Therapy Goal  Goals to be met by: 19     Patient will increase functional independence with mobility by performin. Supine to sit with Set-up [place]. Not met  2. Sit to supine with Stand-by Assistance. Not met  3. Sit to stand transfer with Stand-by Assistance. Not met  4. Bed to chair transfer with Stand-by Assistance using Rolling Walker. Not met  5. Gait  x 100 feet with Stand-by Assistance using Rolling Walker.  Not met  6. Ascend/descend 5 stair with right OR left Handrails Contact Guard Assistance. Not met  7. Stand for 2 minutes with Stand-by Assistance using Rolling Walker while performing activity.  REVISED: Stand for 3 min c/ Stand-by Assistance using Rolling Walker while performing activity.        Outcome: Ongoing (interventions implemented as appropriate)  LTGs remain appropriate. Pt will continue PT POC.    Refugio Askew, SPT  2019

## 2019-06-23 NOTE — PROGRESS NOTES
[facility]                                  Skilled Nursing Facility                   Progress Note     Admit Date: 6/10/2019  JACKELYN TBD   Principal Problem:  Acute and chronic respiratory failure with hypoxia   HPI obtained from patient interview and chart review     Visit Date: 6/23/2019    Chief Complaint:  Nurse reported new right heel bleeding excessively     HPI:   Ms. Medel is a 58 year old female with PMHx of pulmonary sarcoidosis on 3-5L home oxygen, Class 3 HFrEF (3/2019 EF 35%), perm Afib/flutter s/p 6/2017 AVN ablation & PPM, ESRD (HD TTS), Type 2 DM (3/2019 A1c 6.2%), pulmonary HTN, YOANDY, and orthostatic hypotension who presents to SNF following a hospitalization for fluid overload with respiratory decompensation and bacteremia.    Interval history: Assessment of right heel revealed large wound measuring approximately 3 cm long which was created by patient scratching her heel with a back scratcher, patient states she is not in pain at this time and her feet do not itch.  Initiated nursing wound care orders. And initiated wound care consult. INR pending. Patient progessing with PT/OT. Patient medically complex. Continuing to follow and treat all acute and chronic conditions.    Past Medical History: Patient has a past medical history of Anemia in ESRD (end-stage renal disease) (3/7/2016), Anticoagulant long-term use, Cervical radiculopathy (7/20/2015), CHF (congestive heart failure), Chronic combined systolic and diastolic heart failure (6/14/2013), Chronic respiratory failure with hypoxia (04/22/2013), Closed fracture of proximal end of right fibula (6/27/2016), Complications due to renal dialysis device, implant, and graft, DDD (degenerative disc disease), lumbar (6/17/2015), Dependence on renal dialysis, Diabetes mellitus type II, controlled (12/8/2017), ESRD on hemodialysis (2/23/2016), Essential hypertension, Gout, Lateral  meniscal tear (2016), Lumbar stenosis (2015), Obesity, Class III, BMI 40-49.9 (morbid obesity), Pacemaker, Paroxysmal atrial fibrillation (2014), Peripheral neuropathy (2013), Personal history of gastric ulcer (3/19/2013), Pneumonia of left lower lobe due to infectious organism (3/10/2019), Sarcoidosis, lung (), Secondary pulmonary hypertension (2015), Seizure disorder (3/19/2013), Shingles (2013), and Thyroid disease.    Past Surgical History: Patient has a past surgical history that includes Abdominal surgery;  section; OTHER SURGICAL HISTORY; Vascular surgery; stent to fistula; Cardiac pacemaker placement; Fistulogram (Left, 2019); Percutaneous transluminal angioplasty of arteriovenous fistula (N/A, 2019); Fistulogram (Left, 2019); and Fistulogram (Left, 2019).    Social History: Patient reports that she quit smoking about 25 years ago. Her smoking use included cigarettes. She has a 5.00 pack-year smoking history. She has never used smokeless tobacco. She reports that she does not drink alcohol or use drugs.    Family History: family history includes Coronary artery disease in her father; Diabetes in her father, maternal grandmother, and mother; Hypertension in her father and mother; Kidney failure in her mother; Lupus in her sister.    Allergies: Patient is allergic to bactrim [sulfamethoxazole-trimethoprim] and nsaids (non-steroidal anti-inflammatory drug).    ROS  Constitutional: Negative for fever. + fatigue.   Eyes: Negative for blurred vision, double vision and discharge.   Respiratory: + for cough, shortness of breath, baseline. Neg for wheezing.    Cardiovascular: Negative for chest pain, palpitations, claudication, + leg swelling.   Gastrointestinal: Negative for abdominal pain, constipation, diarrhea, nausea and vomiting.   Genitourinary: Negative for dysuria, frequency and urgency.   Musculoskeletal:  + generalized weakness, gait instability.  Negative for back pain and myalgias.   Skin: Negative for itching and rash.   Neurological: Negative for dizziness, speech change, and headaches.   Psychiatric/Behavioral: Negative for depression. The patient is not nervous/anxious.      PEx  Temp:  [97.2 °F (36.2 °C)-97.9 °F (36.6 °C)]   Pulse:  [55-71]   Resp:  [18]   BP: (101-113)/(65-73)   SpO2:  [93 %-100 %]      Constitutional: Patient obese and in no distress   Head: Normocephalic and atraumatic.   Eyes: Pupils are equal, round, and reactive to light.   Neck: Normal range of motion. Neck supple.   Cardiovascular: Normal rate, regular rhythm and normal heart sounds.    Pulmonary/Chest: Effort normal and breath sounds are clear, O2 via NC   Abdominal: Soft. Bowel sounds are normal.   Musculoskeletal: Normal range of motion.   Neurological: Alert and oriented to person, place, and time.   Skin: Skin is warm and dry.  Moisture associated dermatitis noted to underneath breasts and underneath the abdominal fold, mild. + Right heel wound, moderate sanguinous drainage. LUE AVF + thrill and bruit   Psychiatric: Normal mood and affect. Behavior is normal.      Lab Results   Component Value Date    INR 2.7 (H) 06/22/2019    INR 1.8 (H) 06/21/2019    INR 2.3 (H) 06/20/2019     Lab Results   Component Value Date    CALCIUM 8.7 06/20/2019    PHOS 1.6 (L) 06/20/2019       Assessment and Plan:  Open wound of heel, right, initial encounter  -6/23  Initiated nursing wound care orders to gently cleanse with normal saline, pat dry, place non adherent dressing or Vaseline gauze, then wrap with Kerlix, then slight pressure with coban(do not put so tight that it decreases circulation to rest of foot). Change daily and prn. And initiated wound care consult.      Hypophosphatemia  ESRD on hemodialysis  - HD per nephro TuThur and Sat schedule  - HD access: LUE AVF  - Outpatient HD unit: [facility]   -Nephrologist: Patient does not know  - biweekly renal labs   - continue  cinacalcet 30 mg with breakfast, sevelamer carbonate 1600 mg t.i.d. with meals  -Patient on Midodrine TID for low BP   - 6/17 holding Sevelamer due to low phos of 1.5.   -6/18 Patient had HD today, Sevelamer held yesterday due to low phos. Initiate Phos level tomorrow morning to f/u on low phos. Nursing also reporting that they are intermittently having to hold Midodrine due to elevated BP's. Initiate hold parameter orders today to ensure accurate dosing.  -6/19 Lab work reviewed, repeat phosphorus level ,done due to low level and phos binder stopped. Phos level is low at 1.9, dialysis to adjust with HD tomorrow, but improved from previous level.  -6/20 Lab work reviewed, WBC count decreased at 14.69, H&H 9/33, Na 132, K+3.5, BUN/Cr 74/6.7, phos 1.6 AST/ALT 30/29.    Paroxysmal A-fib  Long-term anticoagulation- Goal INR 2-3   - INR 1.6, 2.2, 3.1, 4.7, 2.8 over last 5 days. coumadin held for last two days due to supra therapeutic levels after being given a boost in doing when INR was subtherapeutic for 4 days (6/4- 6/7)    - 6/12 initiated home dosing of warfarin 5mg on Tues, Thurs, Sat; 2.5mg Mon, Wed, Fri   -6/18 Initiate hold of Coumadin today for an elevated INR of 3.3, will repeat tomorrow and titrate appropriately  -6/19 INR 2.7, adjusted dose of Coumadin for today per pharm D. Coumadin today for an elevated INR. Will repeat tomorrow INR and titrate appropriately.  -6/20 Lab work from today reviewed, INR 2.3 continue current dose of Coumadin for today per pharm D. Will repeat tomorrow INR and titrate appropriately.   -6/23 INR pending, awaiting results.     Continue     Leukocytosis  - 6/13 patient has had a steadily rising leukocytosis since 6/4.  Today WBCs 17.88 from 16.97.  This is likely due to long-term steroid use.  Patient was on the wrong dose of prednisone during [facility] admission.  Prednisone increased to 20 mg daily yesterday.  Will continue to trend WBC biweekly.  Patient remains afebrile  - 6/17  increased to 18.19  - 6/20 WBC count 14 (18)    Transaminitis  - Suspect secondary to sepsis, has been downtrending. Biliary duct appeared normal on CT abd   - on 6/10 AST/ALT 36/48.    - 6/12 CMP ordered- Will continue to trend until normal range  - 6/17 AST/ALT normalized to 39/35  -6/20 30/29    Klebsiella oxytoca and Streptococcus salivarius bacteremia  - BCx positive on 5/30. Repeat BCx NGTD on 5/31  - IV ceftriaxone 2g q24hrs x2 weeks per ID - end of therapy 6/14/2019   - midline catheter in place  - no source identified, most suspicious for GI source and possible translocation in patient on chronic steroids     Debility  - Continue with PT/OT for gait training and strengthening and restoration of ADL's   - Encourage mobility, OOB in chair, and early ambulation as appropriate  - Fall precautions   - Monitor for bowel and bladder dysfunction  - Monitor for and prevent skin breakdown and pressure ulcers  - Continue DVT prophylaxis with coumadin      Wound of 1st toe and right skin  - Wound care consulted and has now signed off   - The left great toe has a resolving wound- partial thickness skin tear- ~ 0.5 cm x 0.5 cm x 0.  Cleansed with sterile normal saline and foam dressing applied over area.   - The right shin has a partial thickness skin tear over scar tissue.  Cleansed with sterile normal saline/foam dressing in place.   - pt see Dr. Woodson for podiatry- told pt to set up apt post DC     Acute on chronic combined systolic and diastolic heart failure  - Echo 3/11/19: EF 35% with diastolic dysfunction  - continue aspirin 81mg daily, Plavix 75 mg daily, atorvastatin 80mg  - not on GDMT; BP too low chronically for ACE, BB, etc      Diabetes mellitus, type 2  - Takes tradjenta 5mg daily at home. On 2units detemir here  - last A1c from 5/30 was 6.5     Secondary adrenal insufficiency  - continue fludrocortisone 100 mcg b.i.d., midodrine 10 mg t.i.d.    Epilepsy  - continue keppra 500mg bid     Pulmonary  sarcoidosis  - On 4L O2 chronically  - 6/12 increased prednisone to 20mg daily, per Dr. Altamirano's note and pt states she takes 20mg at home.  - will need outpatient follow up with pulmonology     Apt post DC:  Pulmonary, Podiatry, Nephrology    Future Appointments   Date Time Provider Department Center   7/1/2019  9:00 AM Annette Hernandez NP [facility] ENDODIA [place] amy   7/2/2019  8:00 AM HOME MONITOR DEVICE CHECK, [facility] AIDEN Deluca [facility]   7/5/2019  9:00 AM Carlos A Caputo MD [facility] IM [facility] Bone PCW   9/20/2019 10:00 AM [facility] EMG PROCEDURAL LAB [facility] NEURO [place] amy   10/1/2019  8:00 AM HOME MONITOR DEVICE CHECK, [facility] AIDEN Rose NP

## 2019-06-23 NOTE — PROGRESS NOTES
Nurse told in report that  Patient  Scratched her right heel with metal back scratcher  Causing wound.  Patient at dialysis.

## 2019-06-23 NOTE — PT/OT/SLP PROGRESS
Physical Therapy  Treatment    Sisi Medel   MRN: [id number]   Admitting Diagnosis: Acute and chronic respiratory failure with hypoxia    PT Received On: 06/23/19          Billable Minutes:  Therapeutic Exercise 23 and Total Time 23    Treatment Type: Treatment  PT/PTA: PT     PTA Visit Number: 0       General Precautions: Standard, fall  Orthopedic Precautions: N/A   Braces: N/A    Spiritual, Cultural Beliefs, Latter-day Practices, Values that Affect Care: no    Subjective:  Communicated with patient prior to session. Communicated with NP and charge nurse prior to session. NP requested pt remain in bed for today's therapy session due to wound on R heel.     Pt agreeable to session     Pain/Comfort  Pain Rating 1: (None reported)    Objective:  Patient found supine with HOB elevated with Patient found with: oxygen   Pt limited in therapy today due to wound on R heel.     AM-PAC 6 CLICK MOBILITY  Total Score:17    Bed Mobility:  Sit>Supine: Not performed  Supine>Sit: Attempted; pt declined due to concerns about moving R foot     Transfers:  Sit<>Stand: Not performed  Stand Pivot Transfer: Not performed    Gait:  No ambulation attempted in today's session per NP.    Therex:  Supine, BLE 2x10:  -AP, QS, GS    Supine, LLE 2x10:  -SAQ, HS, Hip abd/add    HOB elevated, using 3 lb dowel and BUE 2x15:  -Overhead press  -Row    Patient left supine with call button in reach.    Assessment:  Sisi Medel is a 58 y.o. female with a medical diagnosis of Acute and chronic respiratory failure with hypoxia. Pt with decreased avinash to therapy today due to wound on R heel. Pt able to avinash supine therex on LLE but limited on RLE today. Pt will continue to benefit from skilled PT.    Rehab identified problem list/impairments: weakness, impaired endurance, impaired self care skills, impaired functional mobilty, gait instability, impaired balance, decreased lower extremity function    Rehab potential is good.    Activity  tolerance: Good    Discharge recommendations: home with home health     Barriers to discharge: Inaccessible home environment    Equipment recommendations: tub bench     GOALS:   Multidisciplinary Problems     Physical Therapy Goals        Problem: Physical Therapy Goal    Goal Priority Disciplines Outcome Goal Variances Interventions   Physical Therapy Goal     PT, PT/OT Ongoing (interventions implemented as appropriate)     Description:  Goals to be met by: 19     Patient will increase functional independence with mobility by performin. Supine to sit with Set-up [place]. Not met  2. Sit to supine with Stand-by Assistance. Not met  3. Sit to stand transfer with Stand-by Assistance. Not met  4. Bed to chair transfer with Stand-by Assistance using Rolling Walker. Not met  5. Gait  x 100 feet with Stand-by Assistance using Rolling Walker.  Not met  6. Ascend/descend 5 stair with right OR left Handrails Contact Guard Assistance. Not met  7. Stand for 2 minutes with Stand-by Assistance using Rolling Walker while performing activity.  REVISED: Stand for 3 min c/ Stand-by Assistance using Rolling Walker while performing activity.                         PLAN:    Patient to be seen 4 x/week(Mon/Wed/Fri/Sun)  to address the above listed problems via gait training, therapeutic activities, therapeutic exercises  Plan of Care expires: 19  Plan of Care reviewed with: patient    Refugio Azar, RAMON  2019

## 2019-06-23 NOTE — PLAN OF CARE
Problem: Physical Therapy Goal  Goal: Physical Therapy Goal  Goals to be met by: 19     Patient will increase functional independence with mobility by performin. Supine to sit with Set-up [place]. Not met  2. Sit to supine with Stand-by Assistance. Not met  3. Sit to stand transfer with Stand-by Assistance. Not met  4. Bed to chair transfer with Stand-by Assistance using Rolling Walker. Not met  5. Gait  x 100 feet with Stand-by Assistance using Rolling Walker.  Not met  6. Ascend/descend 5 stair with right OR left Handrails Contact Guard Assistance. Not met  7. Stand for 2 minutes with Stand-by Assistance using Rolling Walker while performing activity.  REVISED: Stand for 3 min c/ Stand-by Assistance using Rolling Walker while performing activity.        Outcome: Ongoing (interventions implemented as appropriate)  LTGs remain appropriate. Pt will continue PT POC.    Refugio Askew, SPT  2019

## 2019-06-23 NOTE — PT/OT/SLP PROGRESS
Occupational Therapy  Treatment    Sisi Medel   MRN: [id number]   Admitting Diagnosis: Acute and chronic respiratory failure with hypoxia    OT Date of Treatment: 06/23/19       Billable Minutes:  Therapeutic Activity 15 and Therapeutic Exercise 15    General Precautions: Standard, fall, seizure  Orthopedic Precautions: N/A  Braces: N/A    Spiritual, Cultural Beliefs, Zoroastrianism Practices, Values that Affect Care: no    Subjective:  Communicated with patient prior to session.    Pain/Comfort  Pain Rating 1: 0/10  Pain Rating Post-Intervention 1: 0/10    Objective:   Patient found supine with HOB in chair position.     Occupational Performance:    [facility] 6 Click:  [facility] Total Score: 19    OT Exercises: BUE AROM x 3 sets 10 reps in all available pain free ranges to increase strength and endurance needed for ADLs seated in bed.    Additional Treatment:  Patient participated in balloon toss using 2 lb dowel to hit balloon x 75 reps to increase strength and endurance needed for ADLs.     Patient left supine with HOB in chair position with call button in reach.    ASSESSMENT:  Sisi Medel is a 58 y.o. female with a medical diagnosis of Acute and chronic respiratory failure with hypoxia and presents with the deficits listed below. Patient refused to come to therapy gym, but agreed to complete exercises in bed. Patient now has a wound on R heel and can't put pressure on it. Patient required several rest breaks throughout session. Patient will benefit from continued OT service to increase independence with ADLs.     Rehab identified problem list/impairments: impaired endurance, impaired self care skills, impaired functional mobilty    Rehab potential is good    Activity tolerance: Fair    Discharge recommendations:       Barriers to discharge: None     Equipment recommendations: (TBD as pt. progresses)     GOALS:   Multidisciplinary Problems     Occupational Therapy Goals        Problem: Occupational  Therapy Goal    Goal Priority Disciplines Outcome Interventions   Occupational Therapy Goal     OT, PT/OT Ongoing (interventions implemented as appropriate)    Description:  Goals to be met by: 06-24-19      Therapy to be on MWF and SUN (4x/week) addended 06-13-`19    Patient will increase functional independence with ADLs by performing:    UE Dressing with Set-up Assistance. GOAL MET 06/16/19  LE Dressing with Supervision.  Grooming while standing with Supervision.  Toileting from bedside commode with Supervision for hygiene and clothing management.   Bathing from  edge of bed with Supervision.  Supine to sit with Modified [place]. GOAL MET 06/16/19  Stand pivot transfers with Supervision.  Toilet transfer to bedside commode with Supervision.  Pt. To be I with HEP for BUE to improve level of endurance  Pt. To engage in dynamic standing activity x 10 minutes with S                        Plan:  Patient to be seen 4 x/week(M, W, F and Sun) to address the above listed problems via self-care/home management, therapeutic activities, therapeutic exercises  Plan of Care expires: 07/11/19  Plan of Care reviewed with: patient    LA NENA Muse  06/23/2019

## 2019-06-24 PROBLEM — E11.621 DIABETIC ULCER OF RIGHT HEEL: Status: ACTIVE | Noted: 2019-01-01

## 2019-06-24 PROBLEM — L97.419 DIABETIC ULCER OF RIGHT HEEL: Status: ACTIVE | Noted: 2019-01-01

## 2019-06-24 NOTE — PROGRESS NOTES
Wound care reconsulted for right heel wound.     Per consult and patient foot was itchy and she began to peel the dry skin. Patient appears to have pulled several areas of skin off. Wound has been oozing since Saturday per patient. Macerated tissue noted to the plantar heel. No odor or purulence noted. Area cleansed and foam dressing with calcium alginate base applied. Dressing then wrapped with abd and kerlex.         Recommend:  FU with podiatry  Hydrofera blue every other day to the wound.     Wound care to follow PRN .  Brynn Woodson RN [facility]   [phone]         06/24/19 1405        Wound 06/22/19 1930 Laceration Heel   Date First Assessed/Time First Assessed: 06/22/19 1930   Primary Wound Type: Laceration  Side: Right  Location: Heel  Removal Indication and Assessment: not present upon hospital arrival   Wound Image     Wound WDL ex   Dressing Appearance Moist drainage   Drainage Amount Moderate   Drainage Characteristics/Odor Sanguineous   Appearance Bleeding   Tissue loss description Full thickness   Periwound Area Moist;Macerated   Wound Edges Open   Wound Length (cm) 9 cm   Wound Width (cm) 6 cm   Wound Depth (cm) 0.1 cm   Wound Volume (cm^3) 5.4 cm^3   Wound Surface Area (cm^2) 54 cm^2   Care Cleansed with:;Sterile normal saline   Dressing Removed;Applied;Changed;Foam;Rolled gauze   Dressing Change Due 06/25/19

## 2019-06-24 NOTE — PROGRESS NOTES
Dressing to right heel saturated with blood.    Dressing changer per md. Order.  Patient tolerated procedure well. Right heel elevated on pillow with heel floating.

## 2019-06-24 NOTE — PROGRESS NOTES
Dressing changed per nurse and charge nurse per md orders. Old dressings saturated with blood.  Call light in reach.   Hell elevated with pillows with heel floating.

## 2019-06-24 NOTE — PLAN OF CARE
Problem: Physical Therapy Goal  Goal: Physical Therapy Goal  Goals to be met by: 19     Patient will increase functional independence with mobility by performin. Supine to sit with Set-up [place]. Not met  2. Sit to supine with Stand-by Assistance. Not met  3. Sit to stand transfer with Stand-by Assistance. Not met  4. Bed to chair transfer with Stand-by Assistance using Rolling Walker. Not met  5. Gait  x 100 feet with Stand-by Assistance using Rolling Walker.  Not met  6. Ascend/descend 5 stair with right OR left Handrails Contact Guard Assistance. Not met  7. Stand for 2 minutes with Stand-by Assistance using Rolling Walker while performing activity.  REVISED: Stand for 3 min c/ Stand-by Assistance using Rolling Walker while performing activity.        Goals remain appropriate. Continue with PT POC as indicated.

## 2019-06-24 NOTE — PROGRESS NOTES
Pt's right foot dressing saturated with blood, dressing change performed. RLE elevated on 2 pillows. Will continue to monitor.

## 2019-06-24 NOTE — PROGRESS NOTES
Patient sucking on sunflower seeds . Patient  Instructed that sunflower seeds have too much salt and salt brings fluid and edema.   Patient verbalized understanding and continued to suck on sunflower seeds.

## 2019-06-24 NOTE — PLAN OF CARE
Problem: Adult Inpatient Plan of Care  Goal: Plan of Care Review  Outcome: Ongoing (interventions implemented as appropriate)     06/24/19 0137   Plan of Care Review   Plan of Care Reviewed With patient       Problem: Fall Injury Risk  Goal: Absence of Fall and Fall-Related Injury  Outcome: Ongoing (interventions implemented as appropriate)  Intervention: Identify and Manage Contributors to Fall Injury Risk     06/24/19 0137   Manage Acute Allergic Reaction   Medication Review/Management medications reviewed   Identify and Manage Contributors to Fall Injury Risk   Self-Care Promotion BADL personal routines maintained;BADL personal objects within reach;safe use of adaptive equipment encouraged     Intervention: Promote Injury-Free Environment     06/24/19 0137   Optimize [place] and Functional Mobility   Environmental Safety Modification assistive device/personal items within reach;clutter free environment maintained;lighting adjusted;mobility aid in reach;room organization consistent   Optimize Balance and Safe Activity   Safety Promotion/Fall Prevention assistive device/personal item within reach;Fall Risk signage in place;Fall Risk reviewed with patient/family;upper side rails raised x 2, lower siderails raised x 1 (Peds only);instructed to call staff for mobility;nonskid shoes/socks when out of bed;lighting adjusted

## 2019-06-24 NOTE — PLAN OF CARE
Problem: Occupational Therapy Goal  Goal: Occupational Therapy Goal  Goals to be met by: 06-24-19      Therapy to be on MWF and SUN (4x/week) addended 06-13-`19    Patient will increase functional independence with ADLs by performing:    UE Dressing with Set-up Assistance. GOAL MET 06/16/19  LE Dressing with Supervision.  Grooming while standing with Supervision.  Toileting from bedside commode with Supervision for hygiene and clothing management.   Bathing from  edge of bed with Supervision.  Supine to sit with Modified [place]. GOAL MET 06/16/19  Stand pivot transfers with Supervision.  Toilet transfer to bedside commode with Supervision.  Pt. To be I with HEP for BUE to improve level of endurance  Pt. To engage in dynamic standing activity x 10 minutes with S       Outcome: Ongoing (interventions implemented as appropriate)  .

## 2019-06-24 NOTE — PT/OT/SLP PROGRESS
Occupational Therapy  Treatment    Sisi Medel   MRN: [id number]   Admitting Diagnosis: Acute and chronic respiratory failure with hypoxia    OT Date of Treatment: 06/24/19       Billable Minutes:  Therapeutic Activity 35    General Precautions: Standard, fall, seizure  Orthopedic Precautions: N/A  Braces: N/A    Spiritual, Cultural Beliefs, Religion Practices, Values that Affect Care: no    Subjective:  Communicated with nsg prior to session.  I am doing well today and waiting for wound care to come    Pain/Comfort  Pain Rating 1: 0/10  Pain Rating Post-Intervention 1: 0/10    Objective:   Pt. Supine in bed on arrival     Occupational Performance:    Bed Mobility:    · Patient completed Scooting/Bridging with stand by assistance  · Patient completed Supine to Sit with stand by assistance     Functional Mobility/Transfers:  · Patient completed Bed <> Chair Transfer using Slide Board technique with moderate assistance with x2 for safety one to (A) Pt. and one to hold RLE while transfering       Activities of Daily Living:  · Lower Body Dressing: total assistance to callie BLE socks    [facility] 6 Click:  [facility] Total Score: 19    OT Exercises: UE Ergometer 10 min    Additional Treatment:  Wound care nurse in during session to change dressing    Pt. With 2# dowel activity with 10 reps with bicep curls and forward flex motion to increase BUE ROM and strength.  Pt. With standing and therex performed to increase ROM, endurance selfcare task and fxl mobility for independence     Patient left up in chair with PTA present    ASSESSMENT:  Sisi Medel is a 58 y.o. female with a medical diagnosis of Acute and chronic respiratory failure with hypoxia Pt. participated well with session on this day.Pt demos physical deficits with balance  functional mobility, UB strength, endurance  level of functional indep with daily tasks and activities and selfcare skills .Pt. Will continue to benefit from continued OT to  progress towards goals      Rehab identified problem list/impairments: impaired endurance, impaired self care skills, impaired functional mobilty    Rehab potential is fair    Activity tolerance: Fair    Discharge recommendations:       Barriers to discharge: None     Equipment recommendations: (TBD as pt. progresses)     GOALS:   Multidisciplinary Problems     Occupational Therapy Goals        Problem: Occupational Therapy Goal    Goal Priority Disciplines Outcome Interventions   Occupational Therapy Goal     OT, PT/OT Ongoing (interventions implemented as appropriate)    Description:  Goals to be met by: 06-24-19      Therapy to be on MWF and SUN (4x/week) addended 06-13-`19    Patient will increase functional independence with ADLs by performing:    UE Dressing with Set-up Assistance. GOAL MET 06/16/19  LE Dressing with Supervision.  Grooming while standing with Supervision.  Toileting from bedside commode with Supervision for hygiene and clothing management.   Bathing from  edge of bed with Supervision.  Supine to sit with Modified [place]. GOAL MET 06/16/19  Stand pivot transfers with Supervision.  Toilet transfer to bedside commode with Supervision.  Pt. To be I with HEP for BUE to improve level of endurance  Pt. To engage in dynamic standing activity x 10 minutes with S                    Plan:  Patient to be seen 4 x/week(M, W, F and Sun) to address the above listed problems via self-care/home management, therapeutic activities, therapeutic exercises  Plan of Care expires: 07/11/19  Plan of Care reviewed with: patient    SABINE Padilla  06/24/2019

## 2019-06-24 NOTE — PT/OT/SLP PROGRESS
Physical Therapy  Treatment    Sisi Medel   MRN: [id number]   Admitting Diagnosis: Acute and chronic respiratory failure with hypoxia    PT Received On: 06/24/19  Total Time (min): (--)       Billable Minutes:  Therapeutic Activity 15 and Therapeutic Exercise 15    Treatment Type: Treatment  PT/PTA: PTA     PTA Visit Number: 1       General Precautions: Standard, fall  Orthopedic Precautions: N/A   Braces: N/A    Spiritual, Cultural Beliefs, [employer] Practices, Values that Affect Care: no    Subjective:  Communicated with nursing prior to session.  Pt agreed to work with therapy.     Pain/Comfort  Pain Rating 1: 0/10  Pain Rating Post-Intervention 1: 0/10    Objective:  Patient found seated w/c.        AM-PAC 6 CLICK MOBILITY  Total Score:17    Bed Mobility:  Pt declined activity on this date.     Transfers:  Sit<>Stand: unable to perform on this date.   Stand Pivot Transfer: unable to perform on this date.   SQPT: w/c<>mat w/ Mod A x2 w/ second person stabilizing w/c, and cueing for proper technique.   Slide board: pt declined using slide board on this date.     Gait:  Unable to perform on this date 2* to      Therex:  BLE therex 3x10 reps:    -AP   -LAQ   -Hip Flexion    -GS    Additional Treatment:  -UBE x5 min     Patient left up in chair with call button in reach.    Assessment:  Sisi Medel is a 58 y.o. female with a medical diagnosis of Acute and chronic respiratory failure with hypoxia.  Pt tolerated BLE therex well, and will continue to benefit from PT services at this time. Continue with PT POC as indicated.    Rehab identified problem list/impairments: weakness, impaired endurance, impaired self care skills, impaired functional mobilty, gait instability, impaired balance, decreased lower extremity function    Rehab potential is fair.    Activity tolerance: Fair    Discharge recommendations: home with home health     Barriers to discharge: Inaccessible home environment    Equipment  recommendations: tub bench     GOALS:   Multidisciplinary Problems     Physical Therapy Goals        Problem: Physical Therapy Goal    Goal Priority Disciplines Outcome Goal Variances Interventions   Physical Therapy Goal     PT, PT/OT Ongoing (interventions implemented as appropriate)     Description:  Goals to be met by: 19     Patient will increase functional independence with mobility by performin. Supine to sit with Set-up [place]. Not met  2. Sit to supine with Stand-by Assistance. Not met  3. Sit to stand transfer with Stand-by Assistance. Not met  4. Bed to chair transfer with Stand-by Assistance using Rolling Walker. Not met  5. Gait  x 100 feet with Stand-by Assistance using Rolling Walker.  Not met  6. Ascend/descend 5 stair with right OR left Handrails Contact Guard Assistance. Not met  7. Stand for 2 minutes with Stand-by Assistance using Rolling Walker while performing activity.  REVISED: Stand for 3 min c/ Stand-by Assistance using Rolling Walker while performing activity.                         PLAN:    Patient to be seen 4 x/week(Mon/Wed/Fri/Sun)  to address the above listed problems via gait training, therapeutic activities, therapeutic exercises  Plan of Care expires: 19  Plan of Care reviewed with: patient    Arabella Tammy, PTA  2019

## 2019-06-24 NOTE — PLAN OF CARE
Problem: Physical Therapy Goal  Goal: Physical Therapy Goal  Goals to be met by: 19     Patient will increase functional independence with mobility by performin. Supine to sit with Set-up [place]. Not met  2. Sit to supine with Stand-by Assistance. Not met  3. Sit to stand transfer with Stand-by Assistance. Not met  4. Bed to chair transfer with Stand-by Assistance using Rolling Walker. Not met  5. Gait  x 100 feet with Stand-by Assistance using Rolling Walker.  Not met  6. Ascend/descend 5 stair with right OR left Handrails Contact Guard Assistance. Not met  7. Stand for 2 minutes with Stand-by Assistance using Rolling Walker while performing activity.  REVISED: Stand for 3 min c/ Stand-by Assistance using Rolling Walker while performing activity.        Goals remain appropriate. Continue with PT POC as indicated.

## 2019-06-25 NOTE — PROGRESS NOTES
Pt bathed and up in w/c. VSS.  Oxygen at 3 lNC. Called respiratory ans spoke with Natalie to bring a new full portable o2 tank for when pt leaves for dialysis. Right foot dressing c/d/i. No c/o pain and s/s of distress noted at this time. Snack bag given to pt. Awaiting transport.

## 2019-06-25 NOTE — NURSING
Arrived on unit at this time  Via wheel chair and transporter [facility] at this time. Patient is awake alert and oriented x4. Resp pattern even and unlabored. No distress noted. No signs of pain indicated.

## 2019-06-25 NOTE — PLAN OF CARE
Problem: Adult Inpatient Plan of Care  Goal: Plan of Care Review  Outcome: Ongoing (interventions implemented as appropriate)       06/18/19 0214   Plan of Care Review   Plan of Care Reviewed With patient         Problem: Fall Injury Risk  Goal: Absence of Fall and Fall-Related Injury  Outcome: Ongoing (interventions implemented as appropriate)  Intervention: Identify and Manage Contributors to Fall Injury Risk       06/18/19 0214   Manage Acute Allergic Reaction   Medication Review/Management medications reviewed   Identify and Manage Contributors to Fall Injury Risk   Self-Care Promotion BADL personal routines maintained;BADL personal objects within reach;safe use of adaptive equipment encouraged      Intervention: Promote Injury-Free Environment       06/18/19 0214   Optimize [place] and Functional Mobility   Environmental Safety Modification assistive device/personal items within reach;clutter free environment maintained;lighting adjusted;mobility aid in reach   Optimize Balance and Safe Activity   Safety Promotion/Fall Prevention assistive device/personal item within reach;commode/urinal/bedpan at bedside;Fall Risk reviewed with patient/family;Fall Risk signage in place;lighting adjusted;medications reviewed;nonskid shoes/socks when out of bed;instructed to call staff for mobility;upper side rails raised x 2, lower siderails raised x 1 (Peds only)

## 2019-06-25 NOTE — PROGRESS NOTES
Pt escorted off floor via w/c by [facility] transporter. Escort requested not to use our o2 tank but that she would use theres.pt sent off floor without complications.

## 2019-06-26 PROBLEM — A49.8 KLEBSIELLA INFECTION: Status: ACTIVE | Noted: 2019-01-01

## 2019-06-26 PROBLEM — A49.1 STREPTOCOCCUS INFECTION: Status: ACTIVE | Noted: 2019-01-01

## 2019-06-26 NOTE — PLAN OF CARE
Problem: Physical Therapy Goal  Goal: Physical Therapy Goal  Goals to be met by: 19     Patient will increase functional independence with mobility by performin. Supine to sit with Set-up [place]. Not met  2. Sit to supine with Stand-by Assistance. Not met  3. Sit to stand transfer with Stand-by Assistance. Not met  4. Bed to chair transfer with Stand-by Assistance using Rolling Walker. Not met  5. Gait  x 100 feet with Stand-by Assistance using Rolling Walker.  Not met  6. Ascend/descend 5 stair with right OR left Handrails Contact Guard Assistance. Not met  7. Stand for 2 minutes with Stand-by Assistance using Rolling Walker while performing activity.  REVISED: Stand for 3 min c/ Stand-by Assistance using Rolling Walker while performing activity.        Outcome: Ongoing (interventions implemented as appropriate)  Goals remain appropriate

## 2019-06-26 NOTE — PT/OT/SLP PROGRESS
Occupational Therapy  Treatment    Sisi Medel   MRN: [id number]   Admitting Diagnosis: Acute and chronic respiratory failure with hypoxia    OT Date of Treatment: 06/26/19       Billable Minutes:  Therapeutic Activity 40    General Precautions: Standard, (Per Dr Ron Pt. is not to place weight into RLE heel )  Orthopedic Precautions: N/A  Braces: N/A    Spiritual, Cultural Beliefs, [employer]ism Practices, Values that Affect Care: no    Subjective:  Communicated with nsg prior to session.  The Dr said I can put pal in my foot ( No orders have been noted for this at this time)    Pain/Comfort  Pain Rating 1: 0/10  Pain Rating Post-Intervention 1: 0/10    Objective:   Pt. Seated in w/c on arrival   Occupational Performance:    Bed Mobility:    · Not Tested     Functional Mobility/Transfers:  · Not tested due to Pt. Not able to place weight into RLE heel    ·     Activities of Daily Living:  · Not tested     [facility] 6 Click:  [facility] Total Score: 19    OT Exercises: UE Ergometer 15 min    Additional Treatment:  Pt. With  Green thera band activity with 2x20 reps with  shd flex, bicep curls horz adb/add and forward flex motion to increase BUE ROM and strength.   Pt. With standing and therex performed to increase ROM, endurance selfcare task and fxl mobility for independence     Patient left up in chair with with PT present  present    ASSESSMENT:  Sisi Medel is a 58 y.o. female with a medical diagnosis of Acute and chronic respiratory failure with hypoxia Pt. participated well with session on this day.Pt demos physical deficits with balance  functional mobility, UB strength, endurance  level of functional indep with daily tasks and activities and selfcare skills .Pt. Will continue to benefit from continued OT to progress towards goals      Rehab identified problem list/impairments: impaired endurance, impaired self care skills, impaired functional mobilty    Rehab potential is fair    Activity tolerance:  Fair    Discharge recommendations:       Barriers to discharge: None     Equipment recommendations: (TBD as pt. progresses)     GOALS:   Multidisciplinary Problems     Occupational Therapy Goals        Problem: Occupational Therapy Goal    Goal Priority Disciplines Outcome Interventions   Occupational Therapy Goal     OT, PT/OT Ongoing (interventions implemented as appropriate)    Description:  Goals to be met by: 06-24-19      Therapy to be on MWF and SUN (4x/week) addended 06-13-`19    Patient will increase functional independence with ADLs by performing:    UE Dressing with Set-up Assistance. GOAL MET 06/16/19  LE Dressing with Supervision. REVISED  LE dressing with Mod (A) seated from EOB.  Grooming while standing with Supervision. REVISED Grooming Mod (I) seated.  Toileting from bedside commode with Supervision for hygiene and clothing management. REVISED  Min (A) with toileting from BSC.  Bathing from  edge of bed with Supervision. REVISED Bathing from EOB with SBA  Excluding feet 2* to dressings on (B) feet.  Supine to sit with Modified [place]. GOAL MET 06/16/19  Stand pivot transfers with Supervision.  REVISED   Sliding board transfers with Min (A)  Toilet transfer to bedside commode with Supervision.   REVISED Min (A) with toilet transfers.  Pt. To be I with HEP for BUE to improve level of endurance  Pt. To engage in dynamic standing activity x 10 minutes with S --  DISCONTINUED                     Plan:  Patient to be seen 4 x/week(M, W, F and Sun) to address the above listed problems via self-care/home management, therapeutic activities, therapeutic exercises  Plan of Care expires: 07/11/19  Plan of Care reviewed with: patient    SABINE Padilla  06/26/2019

## 2019-06-26 NOTE — PROGRESS NOTES
Informed per  that patient was picked up via w/c per hospital transportation for podiatrist appointment.

## 2019-06-26 NOTE — PLAN OF CARE
Problem: Adult Inpatient Plan of Care  Goal: Plan of Care Review  Outcome: Ongoing (interventions implemented as appropriate)  Pt repositioned, safety and pain maintained, will continue to monitor

## 2019-06-26 NOTE — PLAN OF CARE
Problem: Occupational Therapy Goal  Goal: Occupational Therapy Goal  Goals to be met by: 06-24-19      Therapy to be on MWF and SUN (4x/week) addended 06-13-`19    Patient will increase functional independence with ADLs by performing:    UE Dressing with Set-up Assistance. GOAL MET 06/16/19  LE Dressing with Supervision. REVISED  LE dressing with Mod (A) seated from EOB.  Grooming while standing with Supervision. REVISED Grooming Mod (I) seated.  Toileting from bedside commode with Supervision for hygiene and clothing management. REVISED  Min (A) with toileting from BSC.  Bathing from  edge of bed with Supervision. REVISED Bathing from EOB with SBA  Excluding feet 2* to dressings on (B) feet.  Supine to sit with Modified [place]. GOAL MET 06/16/19  Stand pivot transfers with Supervision.  REVISED   Sliding board transfers with Min (A)  Toilet transfer to bedside commode with Supervision.   REVISED Min (A) with toilet transfers.  Pt. To be I with HEP for BUE to improve level of endurance  Pt. To engage in dynamic standing activity x 10 minutes with S --  DISCONTINUED        Outcome: Ongoing (interventions implemented as appropriate)  .

## 2019-06-26 NOTE — PLAN OF CARE
Problem: Occupational Therapy Goal  Goal: Occupational Therapy Goal  Goals to be met by: 06-24-19      Therapy to be on MWF and SUN (4x/week) addended 06-13-`19    Patient will increase functional independence with ADLs by performing:    UE Dressing with Set-up Assistance. GOAL MET 06/16/19  LE Dressing with Supervision.  Grooming while standing with Supervision.  Toileting from bedside commode with Supervision for hygiene and clothing management.   Bathing from  edge of bed with Supervision.  Supine to sit with Modified [place]. GOAL MET 06/16/19  Stand pivot transfers with Supervision.  Toilet transfer to bedside commode with Supervision.  Pt. To be I with HEP for BUE to improve level of endurance  Pt. To engage in dynamic standing activity x 10 minutes with S       Outcome: Ongoing (interventions implemented as appropriate)  .

## 2019-06-26 NOTE — TREATMENT PLAN
Rehab Services' DME recommendations    Sisi Medel  MRN: [id number]      [x] Tub bench Heavy duty      Justification for heavy duty tub bench: patient in need of bariatric equipment due to girth     [x] 3 in 1 commode Drop arm     Justification for wide/heavy duty commode: patient in need of bariatric equipment due to girth      [x] Transfer Devices: Slide Board 29    [x] Home health PT and OT     Sima Lockhart, PTA 6/26/2019     Addended: Stephany Kessler, PT , DPT  6/30/2019

## 2019-06-26 NOTE — PROGRESS NOTES
[facility]                                  Skilled Nursing Facility                   Progress Note     Admit Date: 6/10/2019  JACKELYN TBD   Principal Problem:  Acute and chronic respiratory failure with hypoxia   HPI obtained from patient interview and chart review     Visit Date: 6/27/2019    Chief Complaint: Revaluation of medical treatment and therapy status: Lab review, hyperphosphatemia, Coumadin dosing/review of INR, hyperglycemia    HPI:   Ms. Medel is a 58 year old female with PMHx of pulmonary sarcoidosis on 3-5L home oxygen, Class 3 HFrEF (3/2019 EF 35%), perm Afib/flutter s/p 6/2017 AVN ablation & PPM, ESRD (HD TTS), Type 2 DM (3/2019 A1c 6.2%), pulmonary HTN, YOANDY, and orthostatic hypotension who presents to SNF following a hospitalization for fluid overload with respiratory decompensation and bacteremia.    Interval history: Lab work from today reviewed, WBC count 14.21, leukocytosis continues, but is stable, H&H 8/28, stable, Na 134, BUN/Cr 77/6.6, Phos 5.9, elevated, patient to have HD today, initiate renvela 800 mg PO with dinner daily as patient takes at home due to elevated phos, Mg 2.0. INR reviewed, current level 1.7, Patient scheduled to receive 5 mg dose of Coumadin today, therapeutic goal is INR 2-3. Patient saw Dr Ron in podiatry clinic yesterday. Per nursing, wound care was performed to R heel and left toe in clinic. No new orders received. BS's continue to be elevated, ranging in the 200's, initiate increase detemir to 8U QHS. Patient progessing with PT/OT. Patient medically complex. Continuing to follow and treat all acute and chronic conditions.     Past Medical History: Patient has a past medical history of Anemia in ESRD (end-stage renal disease) (3/7/2016), Anticoagulant long-term use, Cervical radiculopathy (7/20/2015), CHF (congestive heart failure), Chronic combined systolic and diastolic heart failure  (2013), Chronic respiratory failure with hypoxia (2013), Closed fracture of proximal end of right fibula (2016), Complications due to renal dialysis device, implant, and graft, DDD (degenerative disc disease), lumbar (2015), Dependence on renal dialysis, Diabetes mellitus type II, controlled (2017), ESRD on hemodialysis (2016), Essential hypertension, Gout, Lateral meniscal tear (2016), Lumbar stenosis (2015), Obesity, Class III, BMI 40-49.9 (morbid obesity), Pacemaker, Paroxysmal atrial fibrillation (2014), Peripheral neuropathy (2013), Personal history of gastric ulcer (3/19/2013), Pneumonia of left lower lobe due to infectious organism (3/10/2019), Sarcoidosis, lung (), Secondary pulmonary hypertension (2015), Seizure disorder (3/19/2013), Shingles (2013), and Thyroid disease.    Past Surgical History: Patient has a past surgical history that includes Abdominal surgery;  section; OTHER SURGICAL HISTORY; Vascular surgery; stent to fistula; Cardiac pacemaker placement; Fistulogram (Left, 2019); Percutaneous transluminal angioplasty of arteriovenous fistula (N/A, 2019); Fistulogram (Left, 2019); and Fistulogram (Left, 2019).    Social History: Patient reports that she quit smoking about 25 years ago. Her smoking use included cigarettes. She has a 5.00 pack-year smoking history. She has never used smokeless tobacco. She reports that she does not drink alcohol or use drugs.    Family History: family history includes Coronary artery disease in her father; Diabetes in her father, maternal grandmother, and mother; Hypertension in her father and mother; Kidney failure in her mother; Lupus in her sister.    Allergies: Patient is allergic to bactrim [sulfamethoxazole-trimethoprim] and nsaids (non-steroidal anti-inflammatory drug).    ROS  Constitutional: Negative for fever. + fatigue.   Eyes: Negative for blurred vision, double  vision and discharge.   Respiratory: - for cough, shortness of breath, baseline. Neg for wheezing.    Cardiovascular: Negative for chest pain, palpitations, claudication, + leg swelling.   Gastrointestinal: Negative for abdominal pain, constipation, diarrhea, nausea and vomiting.   Genitourinary: Negative for dysuria, frequency and urgency.   Musculoskeletal:  + generalized weakness, gait instability. Negative for back pain and myalgias.   Skin: Negative for itching and rash, + right foot wound, stable.   Neurological: Negative for dizziness, speech change, and headaches.   Psychiatric/Behavioral: Negative for depression. The patient is not nervous/anxious.      PEx  Temp:  [97 °F (36.1 °C)]   Pulse:  [65-69]   Resp:  [18]   BP: (101-112)/(72-75)   SpO2:  [96 %-98 %]      Constitutional: Patient obese and in no distress   Head: Normocephalic and atraumatic.   Eyes: Pupils are equal, round, and reactive to light.   Neck: Normal range of motion. Neck supple.   Cardiovascular: Normal rate, regular rhythm and normal heart sounds.    Pulmonary/Chest: Effort normal and breath sounds are clear, O2 via NC   Abdominal: Soft. Bowel sounds are normal.   Musculoskeletal: Normal range of motion.   Neurological: Alert and oriented to person, place, and time.   Skin: Skin is warm and dry.  Moisture associated dermatitis noted to underneath breasts and underneath the abdominal fold, mild.  Ulcers to left and right foot, +  R foot wound from scratch, not bleeding anymore. LUE AVF + thrill and bruit   Psychiatric: Normal mood and affect. Behavior is normal.      Lab Results   Component Value Date    INR 1.7 (H) 06/27/2019    INR 1.4 (H) 06/26/2019    INR 1.9 (H) 06/25/2019     Lab Results   Component Value Date    CALCIUM 8.4 (L) 06/27/2019    PHOS 5.9 (H) 06/27/2019       Assessment and Plan:    Right Heel wound/laceration- 6/22  -6/24  Patient scratched foot with metal back scratcher over the weekend due to itching, caused a wound  to form. Issues with bleeding, requiring pressure dressings per staff. Patient on Coumadin, causing increased bleeding times. INR 1.7 today, which is subtherapeutic, but in light of continued bleeding and drop in H&H, will initiate hold of Coumadin for today's dose. Will repeat tomorrow INR and titrate appropriately. MISTY ordered per call NP to manage wound, and patient instructed not to weight bear until seen by WC RN today for new consult. Will also initiate ambulatory consult to podiatry, Dr Woodson, as patient states she was seeing Podiatry weekly for foot care prior to admit.   -6/26 Bleeding to R foot has stopped, wound is unchanged, following MISTY RN orders. Discontinue ambulatory consult to podiatry, and initiate inpatient consult to Dr Woodson with podiatry so that patient can bee seen at appt during SNF stay. Discussed with SW, awaiting appt to be made.   -6/27 Patient saw Dr Ron in podiatry clinic yesterday. Per nursing, wound care was performed to R heel and left toe in clinic. No new orders received, awaiting recs for weight bearing status    Paroxysmal A-fib  Long-term anticoagulation- Goal INR 2-3   - INR 1.6, 2.2, 3.1, 4.7, 2.8 over last 5 days. coumadin held for last two days due to supra therapeutic levels after being given a boost in doing when INR was subtherapeutic for 4 days (6/4- 6/7)    - 6/12 initiated home dosing of warfarin 5mg on Tues, Thurs, Sat; 2.5mg Mon, Wed, Fri   -6/18 Initiate hold of Coumadin today for an elevated INR of 3.3, will repeat tomorrow and titrate appropriately  -6/19 INR 2.7, adjusted dose of Coumadin for today per pharm D. Coumadin today for an elevated INR. Will repeat tomorrow INR and titrate appropriately.  -6/24 Lab work from today reviewed,  INR 1.7, will hold dose of Coumadin for today per pharm D due to r foot bleeding. Will repeat tomorrow INR and titrate appropriately.  -6/26 INR reviewed from today, current level 1.4,  Initiate 5 mg booster dose of Coumadin  today  -6/27 INR reviewed, current level 1.7, Patient scheduled to receive 5 mg dose of Coumadin today, therapeutic goal is INR 2-3.    Hypo/Hyperphosphatemia  ESRD on hemodialysis  - HD per nephro Jacobr and Sat schedule  - HD access: [facility]  - Outpatient HD unit: [facility]   -Nephrologist: Patient does not know  - biweekly renal labs   - continue cinacalcet 30 mg with breakfast, sevelamer carbonate 1600 mg t.i.d. with meals  -Patient on Midodrine TID for low BP   - 6/17 holding Sevelamer due to low phos of 1.5.   -6/18 Patient had HD today, Sevelamer held yesterday due to low phos. Initiate Phos level tomorrow morning to f/u on low phos. Nursing also reporting that they are intermittently having to hold Midodrine due to elevated BP's. Initiate hold parameter orders today to ensure accurate dosing.  -6/19 Lab work reviewed, repeat phosphorus level ,done due to low level and phos binder stopped. Phos level is low at 1.9, dialysis to adjust with HD tomorrow, but improved from previous level.  -6/27 Lab work from today reviewed, WBC count 14.21, leukocytosis continues, but is stable, H&H 8/28, stable, Na 134, BUN/Cr 77/6.6, Phos 5.9, elevated, patient to have HD today, initiate renvela 800 mg PO with dinner daily as patient takes at home due to elevated phos, Mg 2.0.    Wound of 1st toe and right skin  - Wound care consulted and has now signed off   - The left great toe has a resolving wound- partial thickness skin tear- ~ 0.5 cm x 0.5 cm x 0.  Cleansed with sterile normal saline and foam dressing applied over area.   - The right shin has a partial thickness skin tear over scar tissue.  Cleansed with sterile normal saline/foam dressing in place.   - pt see Dr. Woodson for podiatry- told pt to set up apt post DC  -6/27 Patient saw Dr Ron in podiatry clinic yesterday. Per nursing, wound care was performed to R heel and left toe in clinic. No new orders received    Diabetes mellitus, type 2  - Takes tradjenta  5mg daily at home. On 2units detemir here  - last A1c from 5/30 was 6.5  -6/26 BS ranging 220-291, uncontrolled, initiate increase in detemir to 4U QHS to better control BS  -6/27 BS's continue to be uncontrolled, ranging in the 200's, initiate increase detemir to 8U QHS today    Continue    Leukocytosis  - 6/13 patient has had a steadily rising leukocytosis since 6/4.  Today WBCs 17.88 from 16.97.  This is likely due to long-term steroid use.  Patient was on the wrong dose of prednisone during [facility] admission.  Prednisone increased to 20 mg daily yesterday.  Will continue to trend WBC biweekly.  Patient remains afebrile  - 6/17 increased to 18.19  - 6/20 WBC count 14 (18), downtrended, stable    Transaminitis  resolved  - Suspect secondary to sepsis, has been downtrending. Biliary duct appeared normal on CT abd   - on 6/10 AST/ALT 36/48.    - 6/12 CMP ordered- Will continue to trend until normal range  - 6/17 AST/ALT normalized to 39/35  -6/20 30/29, stable    Klebsiella oxytoca and Streptococcus salivarius bacteremia  - BCx positive on 5/30. Repeat BCx NGTD on 5/31  - IV ceftriaxone 2g q24hrs x2 weeks per ID - end of therapy 6/14/2019   - midline catheter in place  - no source identified, most suspicious for GI source and possible translocation in patient on chronic steroids     Debility  - Continue with PT/OT for gait training and strengthening and restoration of ADL's   - Encourage mobility, OOB in chair, and early ambulation as appropriate  - Fall precautions   - Monitor for bowel and bladder dysfunction  - Monitor for and prevent skin breakdown and pressure ulcers  - Continue DVT prophylaxis with coumadin      Acute on chronic combined systolic and diastolic heart failure  - Echo 3/11/19: EF 35% with diastolic dysfunction  - continue aspirin 81mg daily, Plavix 75 mg daily, atorvastatin 80mg  - not on GDMT; BP too low chronically for ACE, BB, etc     Secondary adrenal insufficiency  - continue fludrocortisone 100  mcg b.i.d., midodrine 10 mg t.i.d.    Epilepsy  - continue keppra 500mg bid     Pulmonary sarcoidosis  - On 4L O2 chronically  - 6/12 increased prednisone to 20mg daily, per Dr. Altamirano's note and pt states she takes 20mg at home.  - will need outpatient follow up with pulmonology     Apt post DC:  Pulmonary, Podiatry, Nephrology    Future Appointments   Date Time Provider Department Center   7/1/2019  9:00 AM Annette Hernandez NP [facility] ENDODIA [place] [facility]   7/2/2019  8:00 AM HOME MONITOR DEVICE CHECK, [facility] AIDEN [place] [facility]   7/3/2019  9:00 AM Claudia Ron DPM [facility] POD [place] [facility]   7/5/2019  9:00 AM Carlos A Caputo MD [facility] IM [facility]   9/20/2019 10:00 AM [facility] EMG PROCEDURAL LAB [facility] NEURO [place] [facility]   10/1/2019  8:00 AM HOME MONITOR DEVICE CHECK, [facility] AIDEN Sandy NP

## 2019-06-26 NOTE — PT/OT/SLP PROGRESS
"Physical Therapy  Treatment    Sisi Medel   MRN: [id number]   Admitting Diagnosis: Acute and chronic respiratory failure with hypoxia    PT Received On: 06/26/19          Billable Minutes:  Therapeutic Activity 15 and Therapeutic Exercise 15    Treatment Type: Treatment  PT/PTA: PTA     PTA Visit Number: 2       General Precautions: Standard, fall  Orthopedic Precautions: N/A   Braces: N/A    Spiritual, Cultural Beliefs, Protestant Practices, Values that Affect Care: no    Subjective:  "My doctor said I can put weight through my feet" This was confirmed not to be true by Dr Ron via secure chat. Pt agreeable to therapy.    Pain/Comfort  Pain Rating 1: 0/10  Pain Rating Post-Intervention 1: 0/10  Objective:  Patient found in wc in gym both feet wrapped and in Darco shoe /68 Patient found with: oxygen     AM-PAC 6 CLICK MOBILITY  Total Score:17    Wheelchair Mobility:  Patient propels w/c 70' BUE SBA, vcs for turning    Therex:  2x10  GS, LAQ, HF, QS, AP, abd/add    Patient left up in chair with call button in reach.    Assessment:  Sisi Medel is a 58 y.o. female with a medical diagnosis of Acute and chronic respiratory failure with hypoxia.  Pt tolerated BLE therex well. Pt will continue to improve with skilled physical therapy services for therex and wc mgmt.    Rehab identified problem list/impairments: weakness, impaired endurance, impaired self care skills, impaired functional mobilty, gait instability, impaired balance, decreased lower extremity function    Rehab potential is good.    Activity tolerance: Fair    Discharge recommendations: home with home health     Barriers to discharge: Inaccessible home environment    Equipment recommendations: tub bench     GOALS:   Multidisciplinary Problems     Physical Therapy Goals        Problem: Physical Therapy Goal    Goal Priority Disciplines Outcome Goal Variances Interventions   Physical Therapy Goal     PT, PT/OT Ongoing (interventions " implemented as appropriate)     Description:  Goals to be met by: 19     Patient will increase functional independence with mobility by performin. Supine to sit with Set-up [place]. Not met  2. Sit to supine with Stand-by Assistance. Not met  3. Sit to stand transfer with Stand-by Assistance. Not met  4. Bed to chair transfer with Stand-by Assistance using Rolling Walker. Not met  5. Gait  x 100 feet with Stand-by Assistance using Rolling Walker.  Not met  6. Ascend/descend 5 stair with right OR left Handrails Contact Guard Assistance. Not met  7. Stand for 2 minutes with Stand-by Assistance using Rolling Walker while performing activity.  REVISED: Stand for 3 min c/ Stand-by Assistance using Rolling Walker while performing activity.                         PLAN:    Patient to be seen 4 x/week(Mon/Wed/Fri/Sun)  to address the above listed problems via gait training, therapeutic activities, therapeutic exercises  Plan of Care expires: 19  Plan of Care reviewed with: patient    Sima Richy, PTA  2019

## 2019-06-26 NOTE — H&P
Hospital Medicine  History and Physical Exam    Admit Date: 6/10/2019  Principal Problem:  Acute and chronic respiratory failure with hypoxia   Primary care Physician: Carlos A Caputo MD  Code status: Full Code    HPI:   58F with a PMH of ESRD on HD, pulmonary sarcoid on 4L home O2, HFrEF 35% (3/2019), afib/flutter on coumadin s/p 6/2017 AVN ablation with PPM, DM2 (not on insulin), pHTN, YOANDY presents with shortness of breath on 5/30. She has been admitted multiple times due to heart failure from hypervolemia and recently admitted 5/27 - 5/29 to observation for respiratory disress which improved with HD. Ms. Medel was initially admitted to hospital medicine on 5/30 for increased work of breathing and pulmonary edema thought to be secondary to insufficient removal of fluid in outpatient dialysis. She was dialyzed in the ED, however her work of breathing significantly increased and she was admitted to the ICU. Her blood cultures drawn on admission grew Klebsiella oxytoca and Streptococcus salivarius, and she was started on IV Vancomycin and Zosyn by the ICU team. A CT Chest/abd/pelvis was ordered to evaluate for source of bacteremia but was unremarkable. Infectious disease was consulted on 6/2 and recommended two week course of ceftriaxone.    Patient transferred to [facility] with PT and OT to improve functional status and ability to perform ADLs.     Past Medical History: Patient has a past medical history of Anemia in ESRD (end-stage renal disease) (3/7/2016), Anticoagulant long-term use, Cervical radiculopathy (7/20/2015), CHF (congestive heart failure), Chronic combined systolic and diastolic heart failure (6/14/2013), Chronic respiratory failure with hypoxia (04/22/2013), Closed fracture of proximal end of right fibula (6/27/2016), Complications due to renal dialysis device, implant, and graft, DDD (degenerative disc disease), lumbar (6/17/2015), Dependence on renal dialysis, Diabetes mellitus type II,  controlled (2017), ESRD on hemodialysis (2016), Essential hypertension, Gout, Lateral meniscal tear (2016), Lumbar stenosis (2015), Obesity, Class III, BMI 40-49.9 (morbid obesity), Pacemaker, Paroxysmal atrial fibrillation (2014), Peripheral neuropathy (2013), Personal history of gastric ulcer (3/19/2013), Pneumonia of left lower lobe due to infectious organism (3/10/2019), Sarcoidosis, lung (), Secondary pulmonary hypertension (2015), Seizure disorder (3/19/2013), Shingles (2013), and Thyroid disease.    Past Surgical History: Patient has a past surgical history that includes Abdominal surgery;  section; OTHER SURGICAL HISTORY; Vascular surgery; stent to fistula; Cardiac pacemaker placement; Fistulogram (Left, 2019); Percutaneous transluminal angioplasty of arteriovenous fistula (N/A, 2019); Fistulogram (Left, 2019); and Fistulogram (Left, 2019).    Social History: Patient reports that she quit smoking about 25 years ago. Her smoking use included cigarettes. She has a 5.00 pack-year smoking history. She has never used smokeless tobacco. She reports that she does not drink alcohol or use drugs.    Family History: family history includes Coronary artery disease in her father; Diabetes in her father, maternal grandmother, and mother; Hypertension in her father and mother; Kidney failure in her mother; Lupus in her sister.    Medications: reviewed     Allergies: Patient is allergic to bactrim [sulfamethoxazole-trimethoprim] and nsaids (non-steroidal anti-inflammatory drug).    ROS  Constitutional: no fever or chills  Respiratory: no cough or shortness of breath  Cardiovascular: no chest pain or palpitations  Gastrointestinal: no nausea or vomiting, no abdominal pain or change in bowel habits  Genitourinary: no hematuria or dysuria  Integument/Breast: no rash or pruritis  Hematologic/Lymphatic: no easy bruising or lymphadenopathy  Musculoskeletal:  no arthralgias or myalgias  Neurological: no seizures or tremors  Behavioral/Psych: no depression or anxiety    PEx  Temp:  [97.1 °F (36.2 °C)-98.6 °F (37 °C)]   Pulse:  [65-70]   Resp:  [18-20]   BP: (101-128)/(53-77)   SpO2:  [96 %-99 %]   Body mass index is 36.54 kg/m².     General appearance: no distress  Mental status: Alert and oriented x 3  HEENT:  conjunctivae/corneas clear, PERRL  Neck: supple, thyroid not enlarged  Pulm:   normal respiratory effort, CTA B, no c/w/r  Card: RRR, no murmur  Abd: soft, NT, ND, BS present; no masses, no organomegaly  Ext: no edema  Pulses: 2+, symmetric  Skin: color, texture, turgor normal. No rashes or lesions  Neuro: CN II-XII grossly intact, no focal numbness or weakness, normal strength and tone      Ref. Range 6/8/2019 05:42 6/9/2019 05:21 6/10/2019 07:39   WBC Latest Ref Range: 3.90 - 12.70 K/uL 12.05 13.63 (H) 16.97 (H)   RBC Latest Ref Range: 4.00 - 5.40 M/uL 4.91 4.91 4.84   Hemoglobin Latest Ref Range: 12.0 - 16.0 g/dL 12.8 12.8 12.7   Hematocrit Latest Ref Range: 37.0 - 48.5 % 40.5 41.7 41.2   MCV Latest Ref Range: 82 - 98 fL 83 85 85   MCH Latest Ref Range: 27.0 - 31.0 pg 26.1 (L) 26.1 (L) 26.2 (L)   MCHC Latest Ref Range: 32.0 - 36.0 g/dL 31.6 (L) 30.7 (L) 30.8 (L)   RDW Latest Ref Range: 11.5 - 14.5 % 19.7 (H) 19.9 (H) 20.3 (H)   Platelets Latest Ref Range: 150 - 350 K/uL 304 331 311   MPV Latest Ref Range: 9.2 - 12.9 fL 12.3 12.2 12.5   Gran% Latest Ref Range: 38.0 - 73.0 % 82.3 (H) 74.8 (H) 73.8 (H)   Gran # (ANC) Latest Ref Range: 1.8 - 7.7 K/uL 9.9 (H) 10.2 (H) 12.5 (H)   Lymph% Latest Ref Range: 18.0 - 48.0 % 5.4 (L) 7.9 (L) 10.0 (L)   Lymph # Latest Ref Range: 1.0 - 4.8 K/uL 0.7 (L) 1.1 1.7   Mono% Latest Ref Range: 4.0 - 15.0 % 9.2 13.4 12.8   Mono # Latest Ref Range: 0.3 - 1.0 K/uL 1.1 (H) 1.8 (H) 2.2 (H)   Eosinophil% Latest Ref Range: 0.0 - 8.0 % 0.0 0.0 0.1   Eos # Latest Ref Range: 0.0 - 0.5 K/uL 0.0 0.0 0.0   Basophil% Latest Ref Range: 0.0 - 1.9 %  0.4 0.5 0.5   Baso # Latest Ref Range: 0.00 - 0.20 K/uL 0.05 0.07 0.09   nRBC Latest Ref Range: 0 /100 WBC 1 (A) 2 (A) 1 (A)   Immature Grans (Abs) Latest Ref Range: 0.00 - 0.04 K/uL 0.32 (H) 0.46 (H) 0.48 (H)   Immature Granulocytes Latest Ref Range: 0.0 - 0.5 % 2.7 (H) 3.4 (H) 2.8 (H)   Protime Latest Ref Range: 9.0 - 12.5 sec 21.5 (H) 29.8 (H) 46.2 (H)   Coumadin Monitoring INR Latest Ref Range: 0.8 - 1.2  2.2 (H) 3.1 (H) 4.7 (H)   Sodium Latest Ref Range: 136 - 145 mmol/L 125 (L) 127 (L) 124 (L)   Potassium Latest Ref Range: 3.5 - 5.1 mmol/L 4.4 4.0 4.8   Chloride Latest Ref Range: 95 - 110 mmol/L 87 (L) 88 (L) 85 (L)   CO2 Latest Ref Range: 23 - 29 mmol/L 17 (L) 23 19 (L)   Anion Gap Latest Ref Range: 8 - 16 mmol/L 21 (H) 16 20 (H)   BUN, Bld Latest Ref Range: 6 - 20 mg/dL 55 (H) 29 (H) 42 (H)   Creatinine Latest Ref Range: 0.5 - 1.4 mg/dL 7.7 (H) 5.1 (H) 7.0 (H)   eGFR if non African American Latest Ref Range: >60 mL/min/1.73 m^2 5.3 (A) 8.7 (A) 6.0 (A)   eGFR if African American Latest Ref Range: >60 mL/min/1.73 m^2 6.1 (A) 10.1 (A) 6.9 (A)   Glucose Latest Ref Range: 70 - 110 mg/dL 127 (H) 132 (H) 55 (L)   Calcium Latest Ref Range: 8.7 - 10.5 mg/dL 8.1 (L) 8.5 (L) 8.6 (L)   Phosphorus Latest Ref Range: 2.7 - 4.5 mg/dL 5.5 (H) 3.3 3.0   Alkaline Phosphatase Latest Ref Range: 55 - 135 U/L 104 129 115   PROTEIN TOTAL Latest Ref Range: 6.0 - 8.4 g/dL 7.4 7.6 7.5   Albumin Latest Ref Range: 3.5 - 5.2 g/dL 2.9 (L) 3.1 (L) 3.1 (L)   BILIRUBIN TOTAL Latest Ref Range: 0.1 - 1.0 mg/dL 0.4 0.4 0.4   AST Latest Ref Range: 10 - 40 U/L 30 32 36   ALT Latest Ref Range: 10 - 44 U/L 67 (H) 58 (H) 48 (H)     Assessment and Plan:    Klebsiella oxytoca and streptococcus salivarius bacteremia  no source identified, Gastrointestinal translocation possibility  continue ceftriaxone 2 g IV daily until 6/14/2019    Debility -PT/OT    Wounds of BLE  - wound care consulting     Paroxysmal A-fib  Warfarin with goal INR 2-3      ESRD on  hemodialysis  Mineral bone disease  Nephrology consulted  HD TTS  cinacalcet 30 mg with breakfast    Acute on chronic respiratory failure  Acute on chronic combined systolic and diastolic heart failure  Echo 3/11/19: EF 35% with diastolic dysfunction  Continue aspirin 81 mg daily, clopidogrel 75 mg daily and atorvastatin 80 mg daily  - BP too low chronically for ACE, BB, etc  Daily weights  Fluid restrictions 800 mL     Diabetes mellitus, type 2  A1c: 6.5  resume home tradjenta on discharge    Continue basal insulin and SSI      Secondary adrenal insufficiency  Chronic hypotension  - continue prednisone 20 mg daily, fludrocortisone 100 mg BID, midodrine 10 mg TID     Epilepsy  Continue home keppra 500mg bid     Pulmonary sarcoidosis  On 4L O2 chronically  will need outpatient follow up with pulmonology  Continue prednisone 20 mg daily per last pulmonary vist    Continue the following medications for treatment of the indicated conditions:  ·  Indication Medication Dose Route  Frequency       aspirin  81 mg Oral Daily    atorvastatin  80 mg Oral Daily    cinacalcet  30 mg Oral Daily with breakfast    clopidogrel  75 mg Oral Daily    fludrocortisone  100 mcg Oral BID    insulin detemir U-100  4 Units Subcutaneous QHS    levETIRAcetam  500 mg Oral BID    miconazole NITRATE 2 %   Topical (Top) BID    midodrine  10 mg Oral TID    pantoprazole  40 mg Oral Daily    prednisoLONE acetate  1 drop Left Eye TID    [START ON 6/27/2019] predniSONE  20 mg Oral Daily    senna-docusate 8.6-50 mg  1 tablet Oral BID    vitamin renal formula (B-complex-vitamin c-folic acid)  1 capsule Oral Daily    [START ON 6/28/2019] warfarin  2.5 mg Oral Every Mon, Wed, Fri    warfarin  5 mg Oral Every Tues, Thurs, Sat, Sun    warfarin  5 mg Oral Once       Future Appointments   Date Time Provider Department Center   7/1/2019  9:00 AM Annette Hernandez NP [facility] ENDOD[facility] [place]   7/2/2019  8:00 AM HOME MONITOR DEVICE CHECK, [facility]  [facility] [address]   7/3/2019  9:00 AM Claudia Ron DPM [facility] POD [address]   7/5/2019  9:00 AM Carlos A Caputo MD [facility] IM [address] PCW   9/20/2019 10:00 AM [facility] EMG PROCEDURAL LAB [facility] NEURO [address]   10/1/2019  8:00 AM HOME MONITOR DEVICE CHECK, [facility] [address]     I certify that SNF services are required to be given on an inpatient basis because Sisi Medel needs for skilled nursing care and/or skilled rehabilitation are required on a daily basis and such services can only practically be provided in a skilled nursing facility setting and are for an ongoing condition for which she received inpatient care in the hospital.     Time (minutes) spent in care of the patient (Greater than 1/2 spent in direct face-to-face contact) 40    Veena Mendoza MD

## 2019-06-26 NOTE — PROGRESS NOTES
[facility]                                  Skilled Nursing Facility                   Progress Note     Admit Date: 6/10/2019  JACKELYN TBD   Principal Problem:  Acute and chronic respiratory failure with hypoxia   HPI obtained from patient interview and chart review     Visit Date: 6/26/2019    Chief Complaint: uncontrolled BS, re evaluation of right foot wound, Coumadin dosing/review of INR    HPI:   Ms. Medel is a 58 year old female with PMHx of pulmonary sarcoidosis on 3-5L home oxygen, Class 3 HFrEF (3/2019 EF 35%), perm Afib/flutter s/p 6/2017 AVN ablation & PPM, ESRD (HD TTS), Type 2 DM (3/2019 A1c 6.2%), pulmonary HTN, YOANDY, and orthostatic hypotension who presents to SNF following a hospitalization for fluid overload with respiratory decompensation and bacteremia.    Interval history: BS ranging 220-291, uncontrolled, initiate increase in detemir to 4U QHS to better control BS. Bleeding to R foot has stopped, wound is unchanged, following WC RN orders. Discontinue ambulatory consult to podiatry, and initiate inpatient consult to Dr Woodson with podiatry so that patient can be seen at appt during SNF stay. Discussed with SW, awaiting appt to be made. INR reviewed from today, current level 1.4,  Initiate 5 mg booster dose of Coumadin today, therapeutic goal is INR 2-3.  Patient progessing with PT/OT. Patient medically complex. Continuing to follow and treat all acute and chronic conditions.      Past Medical History: Patient has a past medical history of Anemia in ESRD (end-stage renal disease) (3/7/2016), Anticoagulant long-term use, Cervical radiculopathy (7/20/2015), CHF (congestive heart failure), Chronic combined systolic and diastolic heart failure (6/14/2013), Chronic respiratory failure with hypoxia (04/22/2013), Closed fracture of proximal end of right fibula (6/27/2016), Complications due to renal dialysis device, implant, and  graft, DDD (degenerative disc disease), lumbar (2015), Dependence on renal dialysis, Diabetes mellitus type II, controlled (2017), ESRD on hemodialysis (2016), Essential hypertension, Gout, Lateral meniscal tear (2016), Lumbar stenosis (2015), Obesity, Class III, BMI 40-49.9 (morbid obesity), Pacemaker, Paroxysmal atrial fibrillation (2014), Peripheral neuropathy (2013), Personal history of gastric ulcer (3/19/2013), Pneumonia of left lower lobe due to infectious organism (3/10/2019), Sarcoidosis, lung (), Secondary pulmonary hypertension (2015), Seizure disorder (3/19/2013), Shingles (2013), and Thyroid disease.    Past Surgical History: Patient has a past surgical history that includes Abdominal surgery;  section; OTHER SURGICAL HISTORY; Vascular surgery; stent to fistula; Cardiac pacemaker placement; Fistulogram (Left, 2019); Percutaneous transluminal angioplasty of arteriovenous fistula (N/A, 2019); Fistulogram (Left, 2019); and Fistulogram (Left, 2019).    Social History: Patient reports that she quit smoking about 25 years ago. Her smoking use included cigarettes. She has a 5.00 pack-year smoking history. She has never used smokeless tobacco. She reports that she does not drink alcohol or use drugs.    Family History: family history includes Coronary artery disease in her father; Diabetes in her father, maternal grandmother, and mother; Hypertension in her father and mother; Kidney failure in her mother; Lupus in her sister.    Allergies: Patient is allergic to bactrim [sulfamethoxazole-trimethoprim] and nsaids (non-steroidal anti-inflammatory drug).    ROS  Constitutional: Negative for fever. + fatigue.   Eyes: Negative for blurred vision, double vision and discharge.   Respiratory: - for cough, shortness of breath, baseline. Neg for wheezing.    Cardiovascular: Negative for chest pain, palpitations, claudication, + leg swelling.    Gastrointestinal: Negative for abdominal pain, constipation, diarrhea, nausea and vomiting.   Genitourinary: Negative for dysuria, frequency and urgency.   Musculoskeletal:  + generalized weakness, gait instability. Negative for back pain and myalgias.   Skin: Negative for itching and rash, + right foot wound, stable.   Neurological: Negative for dizziness, speech change, and headaches.   Psychiatric/Behavioral: Negative for depression. The patient is not nervous/anxious.      PEx  Temp:  [97.1 °F (36.2 °C)-98.6 °F (37 °C)]   Pulse:  [66-70]   Resp:  [18-20]   BP: (114-128)/(53-77)   SpO2:  [97 %-99 %]      Constitutional: Patient obese and in no distress   Head: Normocephalic and atraumatic.   Eyes: Pupils are equal, round, and reactive to light.   Neck: Normal range of motion. Neck supple.   Cardiovascular: Normal rate, regular rhythm and normal heart sounds.    Pulmonary/Chest: Effort normal and breath sounds are clear, O2 via NC   Abdominal: Soft. Bowel sounds are normal.   Musculoskeletal: Normal range of motion.   Neurological: Alert and oriented to person, place, and time.   Skin: Skin is warm and dry.  Moisture associated dermatitis noted to underneath breasts and underneath the abdominal fold, mild.  Ulcers to left and right foot, +  R foot wound from scratch, not bleeding anymore. LUE AVF + thrill and bruit   Psychiatric: Normal mood and affect. Behavior is normal.      Lab Results   Component Value Date    INR 1.4 (H) 06/26/2019    INR 1.9 (H) 06/25/2019    INR 1.7 (H) 06/24/2019     Lab Results   Component Value Date    CALCIUM 7.8 (L) 06/24/2019    PHOS 3.8 06/24/2019       Assessment and Plan:    Diabetes mellitus, type 2  - Takes tradjenta 5mg daily at home. On 2units detemir here  - last A1c from 5/30 was 6.5  -6/26 BS ranging 220-291, uncontrolled, initiate increase in detemir to 4U QHS to better control BS    Right Heel wound/laceration- 6/22  -6/24  Patient scratched foot with metal back  scratcher over the weekend due to itching, caused a wound to form. Issues with bleeding, requiring pressure dressings per staff. Patient on Coumadin, causing increased bleeding times. INR 1.7 today, which is subtherapeutic, but in light of continued bleeding and drop in H&H, will initiate hold of Coumadin for today's dose. Will repeat tomorrow INR and titrate appropriately. MISTY ordered per call NP to manage wound, and patient instructed not to weight bear until seen by MISTY RN today for new consult. Will also initiate ambulatory consult to podiatry, Dr Woodson, as patient states she was seeing Podiatry weekly for foot care prior to admit.   -6/26 Bleeding to R foot has stopped, wound is unchanged, following MISTY RN orders. Discontinue ambulatory consult to podiatry, and initiate inpatient consult to Dr Woodson with podiatry so that patient can bee seen at appt during SNF stay. Discussed with SW, awaiting appt to be made.     Paroxysmal A-fib  Long-term anticoagulation- Goal INR 2-3   - INR 1.6, 2.2, 3.1, 4.7, 2.8 over last 5 days. coumadin held for last two days due to supra therapeutic levels after being given a boost in doing when INR was subtherapeutic for 4 days (6/4- 6/7)    - 6/12 initiated home dosing of warfarin 5mg on Tues, Thurs, Sat; 2.5mg Mon, Wed, Fri   -6/18 Initiate hold of Coumadin today for an elevated INR of 3.3, will repeat tomorrow and titrate appropriately  -6/19 INR 2.7, adjusted dose of Coumadin for today per pharm D. Coumadin today for an elevated INR. Will repeat tomorrow INR and titrate appropriately.  -6/24 Lab work from today reviewed,  INR 1.7, will hold dose of Coumadin for today per pharm D due to r foot bleeding. Will repeat tomorrow INR and titrate appropriately.  -6/26 INR reviewed from today, current level 1.4,  Initiate 5 mg booster dose of Coumadin today    Continue     Hypophosphatemia  ESRD on hemodialysis  - HD per nephro TuThur and Sat schedule  - HD access: [facility] AVF  - Outpatient HD  unit: [facility]   -Nephrologist: Patient does not know  - biweekly renal labs   - continue cinacalcet 30 mg with breakfast, sevelamer carbonate 1600 mg t.i.d. with meals  -Patient on Midodrine TID for low BP   - 6/17 holding Sevelamer due to low phos of 1.5.   -6/18 Patient had HD today, Sevelamer held yesterday due to low phos. Initiate Phos level tomorrow morning to f/u on low phos. Nursing also reporting that they are intermittently having to hold Midodrine due to elevated BP's. Initiate hold parameter orders today to ensure accurate dosing.  -6/19 Lab work reviewed, repeat phosphorus level ,done due to low level and phos binder stopped. Phos level is low at 1.9, dialysis to adjust with HD tomorrow, but improved from previous level.  -6/24 Lab work from today reviewed, WBC count 14.97, H&H 8.3/28.7 (9.9/33.4), slightly decreased, Na 133, stable, BUN/Cr 77/6.3, patient to go for HD tomorrow, Phos level 3.8, back in the normal range since HD replaced, Mg 2, stable.     Leukocytosis  - 6/13 patient has had a steadily rising leukocytosis since 6/4.  Today WBCs 17.88 from 16.97.  This is likely due to long-term steroid use.  Patient was on the wrong dose of prednisone during [facility] admission.  Prednisone increased to 20 mg daily yesterday.  Will continue to trend WBC biweekly.  Patient remains afebrile  - 6/17 increased to 18.19  - 6/20 WBC count 14 (18), downtrended, stable    Transaminitis  resolved  - Suspect secondary to sepsis, has been downtrending. Biliary duct appeared normal on CT abd   - on 6/10 AST/ALT 36/48.    - 6/12 CMP ordered- Will continue to trend until normal range  - 6/17 AST/ALT normalized to 39/35  -6/20 30/29, stable    Klebsiella oxytoca and Streptococcus salivarius bacteremia  - BCx positive on 5/30. Repeat BCx NGTD on 5/31  - IV ceftriaxone 2g q24hrs x2 weeks per ID - end of therapy 6/14/2019   - midline catheter in place  - no source identified, most suspicious for GI source and  possible translocation in patient on chronic steroids     Debility  - Continue with PT/OT for gait training and strengthening and restoration of ADL's   - Encourage mobility, OOB in chair, and early ambulation as appropriate  - Fall precautions   - Monitor for bowel and bladder dysfunction  - Monitor for and prevent skin breakdown and pressure ulcers  - Continue DVT prophylaxis with coumadin      Wound of 1st toe and right skin  - Wound care consulted and has now signed off   - The left great toe has a resolving wound- partial thickness skin tear- ~ 0.5 cm x 0.5 cm x 0.  Cleansed with sterile normal saline and foam dressing applied over area.   - The right shin has a partial thickness skin tear over scar tissue.  Cleansed with sterile normal saline/foam dressing in place.   - pt see Dr. Woodson for podiatry- told pt to set up apt post DC     Acute on chronic combined systolic and diastolic heart failure  - Echo 3/11/19: EF 35% with diastolic dysfunction  - continue aspirin 81mg daily, Plavix 75 mg daily, atorvastatin 80mg  - not on GDMT; BP too low chronically for ACE, BB, etc     Secondary adrenal insufficiency  - continue fludrocortisone 100 mcg b.i.d., midodrine 10 mg t.i.d.    Epilepsy  - continue keppra 500mg bid     Pulmonary sarcoidosis  - On 4L O2 chronically  - 6/12 increased prednisone to 20mg daily, per Dr. Altamirano's note and pt states she takes 20mg at home.  - will need outpatient follow up with pulmonology     Apt post DC:  Pulmonary, Podiatry, Nephrology    Future Appointments   Date Time Provider Department Center   6/26/2019 10:45 AM Claudia Ron DPM [facility] POD [address] [facility]   7/1/2019  9:00 AM Annette Hernandez NP [facility] ENDODIA [address] [facility]   7/2/2019  8:00 AM HOME MONITOR DEVICE CHECK, [facility] [address] [facility]   7/5/2019  9:00 AM Carlos A Caputo MD [facility] IM [address] [facility] PCW   9/20/2019 10:00 AM [facility] EMG PROCEDURAL LAB [facility] NEURO [address] [facility]   10/1/2019  8:00 AM HOME MONITOR DEVICE CHECK, [facility] [address]  Smitha Sandy, NP

## 2019-06-27 NOTE — NURSING
Back from dialysis, AAO, denies pain. /66, P70, T98. Placed portable 02 to wall [facility]. Lunch warmed and served. accucheck 183. Instructed to call for assist, call light in reach

## 2019-06-27 NOTE — PLAN OF CARE
Problem: Adult Inpatient Plan of Care  Goal: Plan of Care Review  Outcome: Revised  Repositions independently, no new skin breakdowns noted. alia feet drsg dry and intact. Afebrile. Monitored for pain and safety. Safety maintained. Denies pain

## 2019-06-28 NOTE — PT/OT/SLP PROGRESS
Physical Therapy  Treatment    Sisi Medel   MRN: [id number]   Admitting Diagnosis: Acute and chronic respiratory failure with hypoxia    PT Received On: 06/28/19          Billable Minutes:  Therapeutic Activity 25 and Therapeutic Exercise 5    Treatment Type: Treatment  PT/PTA: PT     PTA Visit Number: 0       General Precautions: Standard, fall  Orthopedic Precautions: N/A   Braces: N/A    Spiritual, Cultural Beliefs, Yazidism Practices, Values that Affect Care: no    Subjective:  Communicated with patient prior to session.  Patient agreeable to session in room w/ FT for mother and .  Patient acknowledges new order by podiatry for NWB RLE. She refuses to consider extending stay to allow for more training w/ NWB status of RLE. Patient reports she will be able to get a ramp built. Patient, her mother and  report understanding of education in up/down steps w/ patient in w/c and handout given.    Pain/Comfort  Pain Rating 1: 6/10  Location - Side 1: Right  Location - Orientation 1: generalized  Location 1: foot  Pain Addressed 1: Distraction  Pain Rating Post-Intervention 1: 5/10    Objective:  Patient found seated EOB w/ oxygen. darco shoes on. with       AM-PAC 6 CLICK MOBILITY  Total Score:12    Transfers:  Stand Pivot Transfer: bed>w/c w/ RW and min assistance but w/ patient not maintaining NWB RLE. W/c>bed w/ RW and max assist to rise,  assisting and not maintaining NWB RLE.   Educated on SQPT and sliding board transfers but patient refuses to practice.    Gait:  Unable. Steps to transfer. Unable to maintain RLE NWB.     Advanced Gait:  Stairs: unable  Curb Step: unable    Wheelchair Mobility:  Patient and family educated w/ demo and use of HO for w/c mobility up and down steps and curb step.      Therex:    Glute sets,   LAQ   Hip flexion  x15      Additional Treatment:  Educated on importance of maintaining RLE NWB; option of staying a few more days at SNF for additional traning  and therapy and patient refuses and family agreeable.    Patient left sitting EOB with all lines intact, call button in reach and family present.    Assessment:  Sisi Medel is a 58 y.o. female with a medical diagnosis of Acute and chronic respiratory failure with hypoxia.  Patient is now NWB RLE and is unable to maintain this status in gait and transfers. Family was present and performed education and training w/ emphasis on up and down steps w/ patient in w/c. Patient reports she will have the ramp finished by Monday. .    Rehab identified problem list/impairments: weakness, impaired endurance, impaired self care skills, impaired functional mobilty, gait instability, impaired balance, decreased lower extremity function    Rehab potential is good.    Activity tolerance: Fair    Discharge recommendations: home with home health     Barriers to discharge: Inaccessible home environment    Equipment recommendations: tub bench     GOALS:   Multidisciplinary Problems     Physical Therapy Goals        Problem: Physical Therapy Goal    Goal Priority Disciplines Outcome Goal Variances Interventions   Physical Therapy Goal     PT, PT/OT Ongoing (interventions implemented as appropriate)     Description:  Goals to be met by: 19 Patient is now RLE NWB per podiatry and goals modifed as noted.    Patient will increase functional independence with mobility by performin. Supine to sit with Set-up [place]. Not met  2. Sit to supine with Stand-by Assistance. Not met  3. Sit to stand transfer with Stand-by Assistance. (addendum 2019 ) and NWB RLE  4. Bed to chair transfer with Stand-by Assistance using Rolling Walker.  (addendum 2019 ) and NWB RLE  5. Gait  x 100 feet with Stand-by Assistance using Rolling Walker.  DISCONTINUE 2019  6. Ascend/descend 5 stair with right OR left Handrails Contact Guard Assistance. DISCONTINUE 2019   7. Stand for 2 minutes with Stand-by  Assistance using Rolling Walker while performing activity.DISCONTINUE  6/28/2019   REVISED: Stand for 3 min c/ Stand-by Assistance using Rolling Walker while performing activity.DISCONTINUE 6/28/2019   NEW GOALs 6/28/2019  8. Patient will trasnfer w/ RW and min assist w/c<>bed/mat and maintain NWB RLE  9. Patient will transfer w/ sliding board and min assistance w/c<>bed/mat and maintain NWB RLE  10. Educate patient and family on technique for up/down steps w/ patient in w/c = met 6/28/2019                             PLAN:    Patient to be seen 4 x/week(Mon/Wed/Fri/Sun)  to address the above listed problems via gait training, therapeutic activities, therapeutic exercises  Plan of Care expires: 07/11/19  Plan of Care reviewed with: patient    Megan MORIN Monae, PT  06/28/2019

## 2019-06-28 NOTE — PT/OT/SLP PROGRESS
Occupational Therapy  Treatment    Sisi Medel   MRN: [id number]   Admitting Diagnosis: Acute and chronic respiratory failure with hypoxia    OT Date of Treatment: 06/28/19       Billable Minutes:40  Therapeutic Exercise 15 min    General Precautions: Standard, (Per Dr Ron Pt. is not to place weight into RLE heel )  Orthopedic Precautions: N/A  Braces: N/A    Spiritual, Cultural Beliefs, Anglican Practices, Values that Affect Care: no    Subjective:  Communicated with nsg prior to session.  I am doing well today    Pain/Comfort  Pain Rating 1: 0/10  Pain Rating Post-Intervention 1: 0/10    Objective:   Pt. Supine on arrival     Occupational Performance:    Bed Mobility:    · Patient completed Supine to Sit with supervision     Functional Mobility/Transfers: Prior to t/f's Pt. Was instructed on using sliding board as noted previous day. Pt. Was adamant of not using board on this day. Pt. Stated I am not using that and hand me my walker. Therapist instructed Pt. That a RW would not be utilized due to Pt. And heel precautions of not placing weight into. Pt.stated that she understood and knows she is not suppose to Put weight in R heel. Despite several warnings Pt. Got up from EOB on her own and Placed herself in w/c . Therapist and Rehab tech present during this process   · Patient completed Sit <> Stand Transfer with stand by assistance  with  no assistive device   · Patient completed Bed <> Chair Transfer using Stand Pivot technique with stand by assistance with no assistive device  ·     Activities of Daily Living:  · Lower Body Dressing: stand by assistance to callie BLE Darco shoes EOB level    AMPA 6 Click:  AMPA Total Score: 19    OT Exercises: UE Ergometer 15 min    Additional Treatment:  Pt. With thera band activity with 2x20 reps with  shd flex, bicep curls horz adb/add and forward flex motion to increase BUE ROM and strength,.   Pt. With standing and therex performed to increase ROM, endurance  selfcare task and fxl mobility for independence     Patient left up in chair with in activity room with act director present    ASSESSMENT:  Sisi Medel is a 58 y.o. female with a medical diagnosis of Acute and chronic respiratory failure with hypoxia Pt. participated well with session on this day.Pt demos physical deficits with balance  functional mobility, UB strength, endurance  level of functional indep with daily tasks and activities and selfcare skills .Pt. Will continue to benefit from continued OT to progress towards goals  .    Rehab identified problem list/impairments: impaired endurance, impaired self care skills, impaired functional mobilty    Rehab potential is fair    Activity tolerance: Fair    Discharge recommendations:       Barriers to discharge: None     Equipment recommendations: (TBD as pt. progresses)     GOALS:   Multidisciplinary Problems     Occupational Therapy Goals        Problem: Occupational Therapy Goal    Goal Priority Disciplines Outcome Interventions   Occupational Therapy Goal     OT, PT/OT Ongoing (interventions implemented as appropriate)    Description:  Goals to be met by: 06-24-19      Therapy to be on MWF and SUN (4x/week) addended 06-13-`19    Patient will increase functional independence with ADLs by performing:    UE Dressing with Set-up Assistance. GOAL MET 06/16/19  LE Dressing with Supervision. REVISED  LE dressing with Mod (A) seated from EOB.  Grooming while standing with Supervision. REVISED Grooming Mod (I) seated.  Toileting from bedside commode with Supervision for hygiene and clothing management. REVISED  Min (A) with toileting from C.  Bathing from  edge of bed with Supervision. REVISED Bathing from EOB with SBA  Excluding feet 2* to dressings on (B) feet.  Supine to sit with Modified [place]. GOAL MET 06/16/19  Stand pivot transfers with Supervision.  REVISED   Sliding board transfers with Min (A)  Toilet transfer to bedside commode with  Supervision.   REVISED Min (A) with toilet transfers.  Pt. To be I with HEP for BUE to improve level of endurance  Pt. To engage in dynamic standing activity x 10 minutes with S --  DISCONTINUED                       Plan:  Patient to be seen 4 x/week(M, W, F and Sun) to address the above listed problems via self-care/home management, therapeutic activities, therapeutic exercises  Plan of Care expires: 07/11/19  Plan of Care reviewed with: patient    SABINE Padilla  06/28/2019

## 2019-06-28 NOTE — PLAN OF CARE
Problem: Adult Inpatient Plan of Care  Goal: Plan of Care Review  Outcome: Revised  Repositions independently, no new skin breakdowns alia feet drsg dry and intact, waiting on clarification of wound care orders with wound care nurse. Afebrile.monitored for pain and safety.safety maintained. Pain meds effective

## 2019-06-28 NOTE — PROGRESS NOTES
[facility]                                  Skilled Nursing Facility                   Progress Note     Admit Date: 6/10/2019  JACKELYN TBD   Principal Problem:  Acute and chronic respiratory failure with hypoxia   HPI obtained from patient interview and chart review     Visit Date: 6/28/2019    Chief Complaint:  Coumadin dosing/review of INR, activity level    HPI:   Ms. Medel is a 58 year old female with PMHx of pulmonary sarcoidosis on 3-5L home oxygen, Class 3 HFrEF (3/2019 EF 35%), perm Afib/flutter s/p 6/2017 AVN ablation & PPM, ESRD (HD TTS), Type 2 DM (3/2019 A1c 6.2%), pulmonary HTN, YOANDY, and orthostatic hypotension who presents to SNF following a hospitalization for fluid overload with respiratory decompensation and bacteremia.    Interval history: INR reviewed, current level 2.1, Patient scheduled to receive 2.5 mg dose of Coumadin today, therapeutic goal is INR 2-3.  Dr Ron clarified NWB status for R foot. Patient progessing with PT/OT. Patient medically complex. Continuing to follow and treat all acute and chronic conditions.     Past Medical History: Patient has a past medical history of Anemia in ESRD (end-stage renal disease) (3/7/2016), Anticoagulant long-term use, Cervical radiculopathy (7/20/2015), CHF (congestive heart failure), Chronic combined systolic and diastolic heart failure (6/14/2013), Chronic respiratory failure with hypoxia (04/22/2013), Closed fracture of proximal end of right fibula (6/27/2016), Complications due to renal dialysis device, implant, and graft, DDD (degenerative disc disease), lumbar (6/17/2015), Dependence on renal dialysis, Diabetes mellitus type II, controlled (12/8/2017), ESRD on hemodialysis (2/23/2016), Essential hypertension, Gout, Lateral meniscal tear (5/31/2016), Lumbar stenosis (6/17/2015), Obesity, Class III, BMI 40-49.9 (morbid obesity), Pacemaker, Paroxysmal atrial fibrillation  (2014), Peripheral neuropathy (2013), Personal history of gastric ulcer (3/19/2013), Pneumonia of left lower lobe due to infectious organism (3/10/2019), Sarcoidosis, lung (), Secondary pulmonary hypertension (2015), Seizure disorder (3/19/2013), Shingles (2013), and Thyroid disease.    Past Surgical History: Patient has a past surgical history that includes Abdominal surgery;  section; OTHER SURGICAL HISTORY; Vascular surgery; stent to fistula; Cardiac pacemaker placement; Fistulogram (Left, 2019); Percutaneous transluminal angioplasty of arteriovenous fistula (N/A, 2019); Fistulogram (Left, 2019); and Fistulogram (Left, 2019).    Social History: Patient reports that she quit smoking about 25 years ago. Her smoking use included cigarettes. She has a 5.00 pack-year smoking history. She has never used smokeless tobacco. She reports that she does not drink alcohol or use drugs.    Family History: family history includes Coronary artery disease in her father; Diabetes in her father, maternal grandmother, and mother; Hypertension in her father and mother; Kidney failure in her mother; Lupus in her sister.    Allergies: Patient is allergic to bactrim [sulfamethoxazole-trimethoprim] and nsaids (non-steroidal anti-inflammatory drug).    ROS  Constitutional: Negative for fever. + fatigue.   Eyes: Negative for blurred vision, double vision and discharge.   Respiratory: - for cough, shortness of breath, baseline. Neg for wheezing.    Cardiovascular: Negative for chest pain, palpitations, claudication, + leg swelling.   Gastrointestinal: Negative for abdominal pain, constipation, diarrhea, nausea and vomiting.   Genitourinary: Negative for dysuria, frequency and urgency.   Musculoskeletal:  + generalized weakness, gait instability. Negative for back pain and myalgias.   Skin: Negative for itching and rash, + right foot wound, stable.   Neurological: Negative for dizziness,  speech change, and headaches.   Psychiatric/Behavioral: Negative for depression. The patient is not nervous/anxious.      PEx  Temp:  [97.6 °F (36.4 °C)-97.7 °F (36.5 °C)]   Pulse:  [70]   Resp:  [16-18]   BP: (106)/(71-79)   SpO2:  [100 %]      Constitutional: Patient obese and in no distress   Head: Normocephalic and atraumatic.   Eyes: Pupils are equal, round, and reactive to light.   Neck: Normal range of motion. Neck supple.   Cardiovascular: Normal rate, regular rhythm and normal heart sounds.    Pulmonary/Chest: Effort normal and breath sounds are clear, O2 via NC   Abdominal: Soft. Bowel sounds are normal.   Musculoskeletal: Normal range of motion.   Neurological: Alert and oriented to person, place, and time.   Skin: Skin is warm and dry.  Moisture associated dermatitis noted to underneath breasts and underneath the abdominal fold, mild.  Ulcers to left and right foot, +  R foot wound from scratch, not bleeding anymore. LUE AVF + thrill and bruit   Psychiatric: Normal mood and affect. Behavior is normal.      Lab Results   Component Value Date    INR 2.1 (H) 06/28/2019    INR 1.7 (H) 06/27/2019    INR 1.4 (H) 06/26/2019     Lab Results   Component Value Date    CALCIUM 8.4 (L) 06/27/2019    PHOS 5.9 (H) 06/27/2019       Assessment and Plan:    Right Heel wound/laceration- 6/22  -6/24  Patient scratched foot with metal back scratcher over the weekend due to itching, caused a wound to form. Issues with bleeding, requiring pressure dressings per staff. Patient on Coumadin, causing increased bleeding times. INR 1.7 today, which is subtherapeutic, but in light of continued bleeding and drop in H&H, will initiate hold of Coumadin for today's dose. Will repeat tomorrow INR and titrate appropriately. MISTY ordered per call NP to manage wound, and patient instructed not to weight bear until seen by MISTY RN today for new consult. Will also initiate ambulatory consult to podiatry, Dr Woodson, as patient states she was seeing  Podiatry weekly for foot care prior to admit.   -6/26 Bleeding to R foot has stopped, wound is unchanged, following WC RN orders. Discontinue ambulatory consult to podiatry, and initiate inpatient consult to Dr Woodson with podiatry so that patient can bee seen at appt during SNF stay. Discussed with SW, awaiting appt to be made.   -6/27 Patient saw Dr Ron in podiatry clinic yesterday. Per nursing, wound care was performed to R heel and left toe in clinic. No new orders received, awaiting recs for weight bearing status  -6/28 Dr Ron clarified NWB status for R foot     Paroxysmal A-fib  Long-term anticoagulation- Goal INR 2-3   - INR 1.6, 2.2, 3.1, 4.7, 2.8 over last 5 days. coumadin held for last two days due to supra therapeutic levels after being given a boost in doing when INR was subtherapeutic for 4 days (6/4- 6/7)    - 6/12 initiated home dosing of warfarin 5mg on Tues, Thurs, Sat; 2.5mg Mon, Wed, Fri   -6/18 Initiate hold of Coumadin today for an elevated INR of 3.3, will repeat tomorrow and titrate appropriately  -6/19 INR 2.7, adjusted dose of Coumadin for today per pharm D. Coumadin today for an elevated INR. Will repeat tomorrow INR and titrate appropriately.  -6/24 Lab work from today reviewed,  INR 1.7, will hold dose of Coumadin for today per pharm D due to r foot bleeding. Will repeat tomorrow INR and titrate appropriately.  -6/26 INR reviewed from today, current level 1.4,  Initiate 5 mg booster dose of Coumadin today  -6/27 INR reviewed, current level 1.7, Patient scheduled to receive 5 mg dose of Coumadin today, therapeutic goal is INR 2-3.  -6/28  INR reviewed, current level 2.1, Patient scheduled to receive 2.5 mg dose of Coumadin today, therapeutic goal is INR 2-3    Continue    Hypo/Hyperphosphatemia  ESRD on hemodialysis  - HD per lisaro Betina and Sat schedule  - HD access: LUE AVF  - Outpatient HD unit: [facility]   -Nephrologist: Patient does not know  - biweekly renal labs   -  continue cinacalcet 30 mg with breakfast, sevelamer carbonate 1600 mg t.i.d. with meals  -Patient on Midodrine TID for low BP   - 6/17 holding Sevelamer due to low phos of 1.5.   -6/18 Patient had HD today, Sevelamer held yesterday due to low phos. Initiate Phos level tomorrow morning to f/u on low phos. Nursing also reporting that they are intermittently having to hold Midodrine due to elevated BP's. Initiate hold parameter orders today to ensure accurate dosing.  -6/19 Lab work reviewed, repeat phosphorus level ,done due to low level and phos binder stopped. Phos level is low at 1.9, dialysis to adjust with HD tomorrow, but improved from previous level.  -6/27 Lab work from today reviewed, WBC count 14.21, leukocytosis continues, but is stable, H&H 8/28, stable, Na 134, BUN/Cr 77/6.6, Phos 5.9, elevated, patient to have HD today, initiate renvela 800 mg PO with dinner daily as patient takes at home due to elevated phos, Mg 2.0.    Wound of 1st toe and right skin  - Wound care consulted and has now signed off   - The left great toe has a resolving wound- partial thickness skin tear- ~ 0.5 cm x 0.5 cm x 0.  Cleansed with sterile normal saline and foam dressing applied over area.   - The right shin has a partial thickness skin tear over scar tissue.  Cleansed with sterile normal saline/foam dressing in place.   - pt see Dr. Woodson for podiatry- told pt to set up apt post DC  -6/27 Patient saw Dr Ron in podiatry clinic yesterday. Per nursing, wound care was performed to R heel and left toe in clinic. No new orders received    Diabetes mellitus, type 2  - Takes tradjenta 5mg daily at home. On 2units detemir here  - last A1c from 5/30 was 6.5  -6/26 BS ranging 220-291, uncontrolled, initiate increase in detemir to 4U QHS to better control BS  -6/27 BS's continue to be uncontrolled, ranging in the 200's, initiate increase detemir to 8U QHS today    Leukocytosis  - 6/13 patient has had a steadily rising leukocytosis  since 6/4.  Today WBCs 17.88 from 16.97.  This is likely due to long-term steroid use.  Patient was on the wrong dose of prednisone during [facility] admission.  Prednisone increased to 20 mg daily yesterday.  Will continue to trend WBC biweekly.  Patient remains afebrile  - 6/17 increased to 18.19  - 6/20 WBC count 14 (18), downtrended, stable    Transaminitis  resolved  - Suspect secondary to sepsis, has been downtrending. Biliary duct appeared normal on CT abd   - on 6/10 AST/ALT 36/48.    - 6/12 CMP ordered- Will continue to trend until normal range  - 6/17 AST/ALT normalized to 39/35  -6/20 30/29, stable    Klebsiella oxytoca and Streptococcus salivarius bacteremia  - BCx positive on 5/30. Repeat BCx NGTD on 5/31  - IV ceftriaxone 2g q24hrs x2 weeks per ID - end of therapy 6/14/2019   - midline catheter in place  - no source identified, most suspicious for GI source and possible translocation in patient on chronic steroids     Debility  - Continue with PT/OT for gait training and strengthening and restoration of ADL's   - Encourage mobility, OOB in chair, and early ambulation as appropriate  - Fall precautions   - Monitor for bowel and bladder dysfunction  - Monitor for and prevent skin breakdown and pressure ulcers  - Continue DVT prophylaxis with coumadin      Acute on chronic combined systolic and diastolic heart failure  - Echo 3/11/19: EF 35% with diastolic dysfunction  - continue aspirin 81mg daily, Plavix 75 mg daily, atorvastatin 80mg  - not on GDMT; BP too low chronically for ACE, BB, etc     Secondary adrenal insufficiency  - continue fludrocortisone 100 mcg b.i.d., midodrine 10 mg t.i.d.    Epilepsy  - continue keppra 500mg bid     Pulmonary sarcoidosis  - On 4L O2 chronically  - 6/12 increased prednisone to 20mg daily, per Dr. Altamirano's note and pt states she takes 20mg at home.  - will need outpatient follow up with pulmonology     Apt post DC:  Pulmonary, Podiatry, Nephrology    Future Appointments   Date  Time Provider Department Center   7/1/2019  9:00 AM Annette Hernandez NP [facility] ENDODIA [facility]   7/2/2019  8:00 AM HOME MONITOR DEVICE CHECK, [facility] ARRLORAINE [place] y   7/3/2019  9:00 AM Claudia Ron DPM [facility] POD [facility]   7/5/2019  9:00 AM Carlos A Caputo MD [facility] IM [facility] PC   9/20/2019 10:00 AM [facility] EMG PROCEDURAL LAB [facility] NEURO [facility]   10/1/2019  8:00 AM HOME MONITOR DEVICE CHECK, [facility] AIDEN [facility]       Huma Sandy, IVETH

## 2019-06-28 NOTE — PLAN OF CARE
Problem: Physical Therapy Goal  Goal: Physical Therapy Goal  Goals to be met by: 19 Patient is now RLE NWB per podiatry and goals modifed as noted.    Patient will increase functional independence with mobility by performin. Supine to sit with Set-up [place]. Not met  2. Sit to supine with Stand-by Assistance. Not met  3. Sit to stand transfer with Stand-by Assistance. (addendum 2019 ) and NWB RLE  4. Bed to chair transfer with Stand-by Assistance using Rolling Walker.  (addendum 2019 ) and NWB RLE  5. Gait  x 100 feet with Stand-by Assistance using Rolling Walker.  DISCONTINUE 2019  6. Ascend/descend 5 stair with right OR left Handrails Contact Guard Assistance. DISCONTINUE 2019   7. Stand for 2 minutes with Stand-by Assistance using Rolling Walker while performing activity.DISCONTINUE  2019   REVISED: Stand for 3 min c/ Stand-by Assistance using Rolling Walker while performing activity.DISCONTINUE 2019   NEW GOALs 2019  8. Patient will trasnfer w/ RW and min assist w/c<>bed/mat and maintain NWB RLE  9. Patient will transfer w/ sliding board and min assistance w/c<>bed/mat and maintain NWB RLE  10. Educate patient and family on technique for up/down steps w/ patient in w/c = met 2019           Goals modified to reflect NWB RLE. Megan Licona, PT 2019

## 2019-06-29 NOTE — PROGRESS NOTES
Patient back from dialysis, aaox4, patient verbalized feeling tired, assisted onto bed, oxygen per n/c at 3 liters, lt upper arm fistula with good bruitt and covered with 2x2 gauze clean ,dry and intact.vitals stable,recorded, afebrile. Lunch served afterwards, pt to call prn. Call light in reach.

## 2019-06-29 NOTE — PLAN OF CARE
Problem: Adult Inpatient Plan of Care  Goal: Plan of Care Review  Outcome: Ongoing (interventions implemented as appropriate)     06/29/19 0243   Plan of Care Review   Plan of Care Reviewed With patient       Problem: Fall Injury Risk  Goal: Absence of Fall and Fall-Related Injury  Outcome: Ongoing (interventions implemented as appropriate)  Intervention: Identify and Manage Contributors to Fall Injury Risk     06/29/19 0243   Manage Acute Allergic Reaction   Medication Review/Management medications reviewed   Identify and Manage Contributors to Fall Injury Risk   Self-Care Promotion safe use of adaptive equipment encouraged;BADL personal routines maintained;BADL personal objects within reach

## 2019-06-29 NOTE — PROGRESS NOTES
Patient refused to have this nurse try to draw blod for pt/inr. Patient verbalized she will let the nurse draw it tomorrow am.

## 2019-06-29 NOTE — PROGRESS NOTES
Pt transported via w/c to dialysis per radha  o2 @3l NC. Right foot wound dressing intact. No c/o pain at this time.

## 2019-06-29 NOTE — PROGRESS NOTES
"[facility] - Skilled Nursing Care  Adult Nutrition  Progress Note    SUMMARY       Recommendations    Recommendation/Intervention: Continue renal diet, novasource renal TID, RD following  Nutrition Goal Status: goal met    Reason for Assessment    Reason For Assessment: RD follow-up  Interdisciplinary Rounds: attended  General Information Comments: taking ONS    Nutrition Risk Screen    Nutrition Risk Screen: large or nonhealing wound, burn or pressure injury  Anthropometrics    Temp: 97.7 °F (36.5 °C)  Height Method: Stated  Height: 5' 8" (172.7 cm)  Height (inches): 68 in  Weight Method: Standard Scale  Weight: 107.4 kg (236 lb 12.4 oz)  Weight (lb): 236.78 lb  Ideal Body Weight (IBW), Female: 140 lb  % Ideal Body Weight, Female (lb): 171.64 lb  BMI (Calculated): 36.6     Lab/Procedures/Meds    Pertinent Labs Reviewed: reviewed  Pertinent Labs Comments: Na 132, PO4 1.6, albumin 3.0  Pertinent Medications Reviewed: reviewed  Pertinent Medications Comments: stopped binders                Estimated/Assessed Needs     Weight Used For Calorie Calculations: 108.2 kg (238 lb 8.6 oz)      Energy Calorie Requirements (kcal): 2145 kcal/d   Energy Need Method: Perkins-St Jeor(1.25 PAL)      Protein Requirements: 108-130 g/d (1-1.2 g/kg)Weight Used For Protein Calculations: 108.2 kg (238 lb 8.6 oz)      Estimated Fluid Requirement Method: other (see comments)(Per MD or 1 mL/kcal)                     Nutrition Prescription Ordered    Current Diet Order: Renal , 800 ml fluid restriciton  Nutrition Order Comments: outside food, snacks  Oral Nutrition Supplement: Novasource renal  TID    Evaluation of Received Nutrient/Fluid Intake    Energy Calories Required: meeting needs  Protein Required: meeting needs  Fluid Required: meeting needs  Comments: oral supplement is in addition to 800 ml restricton  Tolerance: tolerating  % Intake of Estimated Energy Needs: 75 - 100 %  % Meal Intake: 75 - 100 %    Nutrition Risk    Level of " Risk/Frequency of Follow-up: low     Assessment and Plan    Malnutrition of moderate degree  Malnutrition in the context of Acute Illness/Injury    Related to (etiology):  Hypermetabolism of acute diseas    Signs and Symptoms (as evidenced by):  Energy Intake: <75% of estimated energy requirement for > one month  Body Fat Depletion: mild depletion of orbitals   Muscle Mass Depletion: mild depletion of temples, clavicle region, interosseous muscle and lower extremities   Weight Loss: 8% x one month  Fluid Accumulation: mild    Interventions/Recommendations (treatment strategy):  Mineral modified diet  Carbohydrate modified diet  Fluid Restricted diet- 800 ml + ONS  Protein modified diet    Nutrition Diagnosis Status:  New           Monitor and Evaluation     Food and Nutrient Intake: energy intake, food and beverage intake  Food and Nutrient Adminstration: diet order  Physical Activity and Function: nutrition-related ADLs and IADLs  Anthropometric Measurements: weight, weight change  Biochemical Data, Medical Tests and Procedures: inflammatory profile, lipid profile, glucose/endocrine profile, gastrointestinal profile, electrolyte and renal panel  Nutrition-Focused Physical Findings: overall appearance         Malnutrition Assessment  Malnutrition Type: acute illness or injury  Energy Intake: moderate energy intake          Energy Intake (Malnutrition): less than 75% for greater than 7 days  Muscle Mass (Malnutrition): mild depletion   Orbital Region (Subcutaneous Fat Loss): mild depletion  Upper Arm Region (Subcutaneous Fat Loss): well nourished  Thoracic and Lumbar Region: well nourished   [place] Region (Muscle Loss): mild depletion  Dorsal Hand (Muscle Loss): mild depletion  Posterior Calf Region (Muscle Loss): mild depletion            Severe Weight Loss (Malnutrition): greater than 5% in 1 month    Nutrition Follow-Up    RD Follow-up?: Yes

## 2019-06-30 NOTE — PROGRESS NOTES
Nurse practitioner Niya called and informed per this nurse that while patient was on the commode , had a bowel movement, patient was complaining of shortness of breath,and weakness,cool skin and clammy, patient oxygen saturation at 3 liters per n/c was 90%, oxygen level increased temporarily to 4 liters and patient was assisted to bed with two person assistance,vitals signs were 107/67, respirations 22, after a few minutes patient started to feel better (breathing better) and oxygen per n/c was lowered to 3.5 liters, and oxygen saturation was 95%.patient instructed to call prn. Will monitor.

## 2019-06-30 NOTE — PT/OT/SLP PROGRESS
Physical Therapy  Treatment    Sisi Medel   MRN: [id number]   Admitting Diagnosis: Acute and chronic respiratory failure with hypoxia    PT Received On: 06/30/19          Billable Minutes:  Therapeutic Exercise 23    Treatment Type: Treatment  PT/PTA: PT     PTA Visit Number: 0       General Precautions: Standard, fall  Orthopedic Precautions: N/A   Braces: N/A    Spiritual, Cultural Beliefs, Jainism Practices, Values that Affect Care: no    Subjective:  Communicated with nurse prior to session. CHAZ Rivera, reported that pt was clammy with low BP earlier in the day. Pt wanted to remain supine for exercises this afternoon. Discussed importance of maintaining NWB precaution on RLE for healing. Also discussed equipment such as slideboard and drop-arm commode for home use. Pt agreed these would help her.     Pain/Comfort  Pain Rating 1: 0/10    Objective:  Patient found supine in bed with mother present.      AM-PAC 6 CLICK MOBILITY  Total Score:12    Therex x20 reps:  Ankle pumps  Hip abduction  Heel slides  Quad sets   Breaks in between due to shortness of breath.      Patient left HOB elevated with call button in reach.    Assessment:  Sisi Medel is a 58 y.o. female with a medical diagnosis of Acute and chronic respiratory failure with hypoxia.  Ms. Medel was able to perform all of her exercises with breaks in between to allow her to catch her breath. DME discussed.    Rehab identified problem list/impairments: weakness, impaired endurance, impaired self care skills, impaired functional mobilty, gait instability, impaired balance, decreased lower extremity function    Rehab potential is good.    Activity tolerance: Good    Discharge recommendations: home with home health     Barriers to discharge: Inaccessible home environment    Equipment recommendations: tub bench     GOALS:   Multidisciplinary Problems     Physical Therapy Goals        Problem: Physical Therapy Goal    Goal Priority  Disciplines Outcome Goal Variances Interventions   Physical Therapy Goal     PT, PT/OT Ongoing (interventions implemented as appropriate)     Description:  Goals to be met by: 19 Patient is now RLE NWB per podiatry and goals modifed as noted.    Patient will increase functional independence with mobility by performin. Supine to sit with Set-up [place]. Not met  2. Sit to supine with Stand-by Assistance. Not met  3. Sit to stand transfer with Stand-by Assistance. (addendum 2019 ) and NWB RLE  4. Bed to chair transfer with Stand-by Assistance using Rolling Walker.  (addendum 2019 ) and NWB RLE  5. Gait  x 100 feet with Stand-by Assistance using Rolling Walker.  DISCONTINUE 2019  6. Ascend/descend 5 stair with right OR left Handrails Contact Guard Assistance. DISCONTINUE 2019   7. Stand for 2 minutes with Stand-by Assistance using Rolling Walker while performing activity.DISCONTINUE  2019   REVISED: Stand for 3 min c/ Stand-by Assistance using Rolling Walker while performing activity.DISCONTINUE 2019   NEW GOALs 2019  8. Patient will trasnfer w/ RW and min assist w/c<>bed/mat and maintain NWB RLE.  9. Patient will transfer w/ sliding board and min assistance w/c<>bed/mat and maintain NWB RLE.  10. Educate patient and family on technique for up/down steps w/ patient in w/c = met 2019                              PLAN:    Patient to be seen 4 x/week(Mon/Wed/Fri/Sun)  to address the above listed problems via gait training, therapeutic activities, therapeutic exercises  Plan of Care expires: 19  Plan of Care reviewed with: patient    Stephany Kessler, PT  2019

## 2019-06-30 NOTE — PLAN OF CARE
Problem: Physical Therapy Goal  Goal: Physical Therapy Goal  Goals to be met by: 19 Patient is now RLE NWB per podiatry and goals modifed as noted.    Patient will increase functional independence with mobility by performin. Supine to sit with Set-up [place]. Not met  2. Sit to supine with Stand-by Assistance. Not met  3. Sit to stand transfer with Stand-by Assistance. (addendum 2019 ) and NWB RLE  4. Bed to chair transfer with Stand-by Assistance using Rolling Walker.  (addendum 2019 ) and NWB RLE  5. Gait  x 100 feet with Stand-by Assistance using Rolling Walker.  DISCONTINUE 2019  6. Ascend/descend 5 stair with right OR left Handrails Contact Guard Assistance. DISCONTINUE 2019   7. Stand for 2 minutes with Stand-by Assistance using Rolling Walker while performing activity.DISCONTINUE  2019   REVISED: Stand for 3 min c/ Stand-by Assistance using Rolling Walker while performing activity.DISCONTINUE 2019   NEW GOALs 2019  8. Patient will trasnfer w/ RW and min assist w/c<>bed/mat and maintain NWB RLE.  9. Patient will transfer w/ sliding board and min assistance w/c<>bed/mat and maintain NWB RLE.  10. Educate patient and family on technique for up/down steps w/ patient in w/c = met 2019            Outcome: Ongoing (interventions implemented as appropriate)  Goals remain appropriate.

## 2019-07-01 NOTE — PLAN OF CARE
[facility] - [facility]illed Nursing [facility]    HOME HEALTH ORDERS  FACE TO FACE ENCOUNTER    Patient Name: Sisi Medel  YOB: 1960    PCP: Carlos A Caputo MD   [place] [facility] [address] [facility] [address]  PCP Phone Number: [phone]  PCP Fax: [phone]    Encounter Date: 07/01/2019    Admit to Home Health    Diagnoses:  Active Hospital Problems    Diagnosis  POA    *Acute and chronic respiratory failure with hypoxia [J96.21]  Yes    Klebsiella infection [A49.8]  Yes    Diabetic ulcer of right heel [E11.621, L97.419]  Yes    Malnutrition of moderate degree [E44.0]  Yes    Streptococcus infection [A49.1]  Yes    Secondary adrenal insufficiency [E27.49]  Yes    GERD (gastroesophageal reflux disease) [K21.9]  Yes     Chronic    Current chronic use of systemic steroids [Z79.52]  Not Applicable    Hyperlipidemia [E78.5]  Yes    Pulmonary hypertension [I27.20]  Yes     Chronic    Morbid obesity with BMI of 40.0-44.9, adult [E66.01, Z68.41]  Not Applicable     Chronic    Chronic combined systolic and diastolic heart failure [I50.42]  Yes     EF 20% 2013, improved with Medical therapy, negative PET 2013      Pulmonary sarcoidosis [D86.0]  Yes    Epilepsy [G40.909]  Yes      Resolved Hospital Problems   No resolved problems to display.       Future Appointments   Date Time Provider Department Center   7/2/2019  8:00 AM HOME MONITOR DEVICE CHECK, [facility] [place]   7/3/2019  9:00 AM Claudia Ron DPM [facility] POD [place]   7/5/2019  9:00 AM Carlos A Caputo MD [facility] IM [place] PCW   7/5/2019  2:45 PM [facility] OIC-US1 MASTER [facility] ULTR [facility]   9/20/2019 10:00 AM [facility] EMG PROCEDURAL LAB [facility] NEURO [place]   10/1/2019  8:00 AM HOME MONITOR DEVICE CHECK, [facility] [place]       I have seen and examined this patient face to face today. My clinical findings that support the need for the home health skilled services and home bound status are the  following:  Weakness/numbness causing balance and gait disturbance due to fluid overload and respiratory decompensation making it taxing to leave home.    Allergies:  Review of patient's allergies indicates:   Allergen Reactions    Bactrim [sulfamethoxazole-trimethoprim] Other (See Comments)     Causes renal failure    Nsaids (non-steroidal anti-inflammatory drug) Other (See Comments)     Renal failure       Diet: renal diet and fluid restriction: 800    Activities: activity as tolerated    Nursing:   SN to complete comprehensive assessment including routine vital signs. Instruct on disease process and s/s of complications to report to MD. Review/verify medication list sent home with the patient at time of discharge  and instruct patient/caregiver as needed. Frequency may be adjusted depending on start of care date.    Notify MD if SBP > 160 or < 90; DBP > 90 or < 50; HR > 120 or < 50; Temp > 101    LABS:   Daily INRs until therapeutic. INR 7/1 is 3.6 from 2.3 yesterday. First draw 7/2.  CMP 3 times per week; monitoring LFTs. Fax to PCP office     CONSULTS:    Physical Therapy to evaluate and treat. Evaluate for home safety and equipment needs; Establish/upgrade home exercise program. Perform / instruct on therapeutic exercises, gait training, transfer training, and Range of Motion.  Occupational Therapy to evaluate and treat. Evaluate home environment for safety and equipment needs. Perform/Instruct on transfers, ADL training, ROM, and therapeutic exercises.  Aide to provide assistance with personal care, ADLs, and vital signs.     NWB to right foot     MISCELLANEOUS CARE:  Diabetic Care:   SN to perform and educate Diabetic management with blood glucose monitoring:, Fingerstick blood sugar AC and HS and Report CBG < 60 or > 350 to physician.  Home Oxygen:  No change    WOUND CARE ORDERS    Cleanse right heel with normal saline and gauze. Apply hydrofera blue, shiny side up to the right heel and plantar heel,  cover with ABD and kerlex and secure with tape. Change every other day      Medications: Review discharge medications with patient and family and provide education.      I certify that this patient is confined to her home and needs intermittent skilled nursing care, physical therapy and occupational therapy.

## 2019-07-01 NOTE — ASSESSMENT & PLAN NOTE
Continue insulin doses while in the rehab center.  Restart tradjenta when she is sent home, alerted pt if her BG >180 consistently to alert me and we may have to start insulin.

## 2019-07-01 NOTE — PROGRESS NOTES
"Patient just had a bowel movement and while sitting on BSC, patient noted that she is anxious and nerves and verbalized that she feels weak. Patient cleaned assisted per this nurse , then physical therapist assisted patient to wheel chair(2 person assistance). Now patient smiled and this nurse asked if she feels better , patient responded "yes". Afterwards nurse isaac Steele assessed patient and patient verbalized that she felt and was ready to be discharged home, patient verbalized that she miss her "bed". Afterwards instructions received from nurse isaac Steele to discharge patient home.pt's family at bedside.  "

## 2019-07-01 NOTE — NURSING
Patient discharged home at 14:58 as ordered. Discharge instructions given to patient per pharmacist on medication and coumadin teaching and instructions on coumadin INR follow up., patient verbalized understanding. This nurse instructed patient on follow up appointments, signs or symptoms to report to doctor, no driving until new orders received from  pt's doctor, diet, patient's equipment to be delivered to her house, patient verbalized understanding and copy of all discharge orders provided. Patient discharged home via w/c to main entrance to pt's mother vehicle assisted per therapist assistance. ndn.

## 2019-07-01 NOTE — TELEPHONE ENCOUNTER
----- Message from Annette Hernandez NP sent at 7/1/2019 10:34 AM CDT -----  Please schedule thyroid ultrasound and alert patient.    Thank you,   Annette

## 2019-07-01 NOTE — ASSESSMENT & PLAN NOTE
TSH normal at this time.  No thyroid ultrasound on file.   Per CTA of head and neck in 2017- Large thyroid gland with several nodules. Recommend further nonemergent evaluation with ultrasound.  Schedule thyroid ultrasound

## 2019-07-01 NOTE — ASSESSMENT & PLAN NOTE
Her hypotension should have resolved with an increased dose of prednisone if it was due to adrenal insufficiency, so this doesn't appear to be the case.  I do not think the hypotension is related to adrenal insufficiency. She is on a much higher dose of steroids than what she requires, so any residual hypotension must be related to another etiology.  If she gets down to 5 mg of prednisone please alert us and we can assess further.   Continue current doses.

## 2019-07-01 NOTE — PROGRESS NOTES
Patient taken to endocrine appointment via w/c per hospital transportation. Ndn, oxygen per nasal canula at 4 liters.

## 2019-07-01 NOTE — DISCHARGE SUMMARY
[facility] - Skilled Nursing Care  Department of Hospital Medicine  Discharge Summary      Patient Name: Sisi Medel  MRN: [id number]  Admission Date: 6/10/2019  Hospital Length of Stay: 21 days  Discharge Date and Time:  07/01/2019 5:13 PM  Attending Physician: No att. providers found   Discharging Provider: Ivette Sebastian NP  Primary Care Provider: Carlos A Caputo MD    HPI:   Ms. Medel is a 58 year old female with PMHx of pulmonary sarcoidosis on 3-5L home oxygen, Class 3 HFrEF (3/2019 EF 35%), perm Afib/flutter s/p 6/2017 AVN ablation & PPM, ESRD (HD TTS), Type 2 DM (3/2019 A1c 6.2%), pulmonary HTN, YOANDY, and orthostatic hypotension who presents to SNF following a hospitalization for fluid overload and respiratory decompensation      Patient originally presented to [facility] ED with complaints of shortness of breath on 5/30. The patient went to dialysis that day but could not get down to her dry weight.  Per nephrology, should be 111kg but patient only got to 112. Per the patient and her family these symptoms have been persistent for several weeks. She was discharged from [facility] the day prior -5/29/19 following a 2 day admission with similar complaints. At that time the patient needed dialysis for volume overload and new dry weight was established.     Of note patient has had frequent hospitalizations in the recent past. These admissions are typically due to volume overload, CHF exacerbation, and shortness of breath. She follows with pulmonology as an outpatient and is on chronic steroids. Per last outpatient note from Dr. Altamirano on 5/23/19, continued on prednisone 20mg daily for sarcoid as she has had difficulty and increased hospitalizations on lower doses.     In the ED the patient was on BiPAP, chest Xray showing significant pulmonary edema. Nephology was consulted and started dialysis in room. Her work of breathing significantly increased and she was admitted to the ICU. Blood cultures grew Klebsiella  oxytoca and Streptococcus salivarius, and she was started on IV Vancomycin and Zosyn. Vancomycin was discontinued on 6/2.  Her respiratory status improved after admission to the ICU and further dialysis, and she was stepped down to hospital medicine on 6/2. A CT Chest/abd/pelvis was  was unremarkable. Infectious disease was consulted on 6/2 and recommended two week course of ceftriaxone which will end on  6/12/19      Patient will be treated at [facility] with PT and OT to improve functional status and ability to perform ADLs.           Hospital Course:   Patient progressed well with PT and OT. Patient had no significant events during their stay at SNF. Home health was set up. DME was ordered if needed. Follow up appointment have been set up. All prescriptions and discharge instructions were given to patient.      PEx  Constitutional: Patient obese and in no distress   Head: Normocephalic and atraumatic.   Eyes: Pupils are equal, round, and reactive to light.   Neck: Normal range of motion. Neck supple.   Cardiovascular: Normal rate, regular rhythm and normal heart sounds.    Pulmonary/Chest: Effort normal and breath sounds are clear, O2 via NC   Abdominal: Soft. Bowel sounds are normal.   Musculoskeletal: Normal range of motion.   Neurological: Alert and oriented to person, place, and time.   Skin: Skin is warm and dry.  Moisture associated dermatitis noted to underneath breasts and underneath the abdominal fold, mild.  Ulcers to left and right foot, +  R foot wound from scratch, not bleeding anymore. LUE AVF + thrill and bruit   Psychiatric: Normal mood and affect. Behavior is normal.         Consults (From admission, onward)        Status Ordering Provider     Inpatient consult to Podiatry  Once     Provider:  (Not yet assigned)    Completed JOLIE FORD     Inpatient consult to Registered Dietitian/Nutritionist  Once     Provider:  (Not yet assigned)    Completed LUAN PESRAUD     Inpatient  consult to Registered Dietitian/Nutritionist  Once     Provider:  (Not yet assigned)    Completed ARLETH SQUIRES          Significant Diagnostic Studies: Labs:   BMP:   Recent Labs   Lab 07/01/19  0741   *   *   K 4.3   CL 87*   CO2 27   BUN 70*   CREATININE 6.4*   CALCIUM 8.1*   MG 1.9    and CBC   Recent Labs   Lab 07/01/19  0741   WBC 12.64   HGB 8.0*   HCT 25.9*          Pending Diagnostic Studies:     Procedure Component Value Units Date/Time    Protime-INR [[id number]]     Order Status:  Sent Lab Status:  No result     Specimen:  Blood         Final Active Diagnoses:    Diagnosis Date Noted POA    PRINCIPAL PROBLEM:  Acute and chronic respiratory failure with hypoxia [J96.21] 06/10/2019 Yes    Klebsiella infection [A49.8] 06/26/2019 Yes    Diabetic ulcer of right heel [E11.621, L97.419] 06/24/2019 Yes    Malnutrition of moderate degree [E44.0] 06/14/2019 Yes    Streptococcus infection [A49.1] 06/02/2019 Yes    Secondary adrenal insufficiency [E27.49] 02/04/2017 Yes    GERD (gastroesophageal reflux disease) [K21.9] 01/20/2017 Yes     Chronic    Current chronic use of systemic steroids [Z79.52] 11/18/2016 Not Applicable    Hyperlipidemia [E78.5] 03/07/2016 Yes    Pulmonary hypertension [I27.20] 04/07/2015 Yes     Chronic    Morbid obesity with BMI of 40.0-44.9, adult [E66.01, Z68.41] 08/15/2013 Not Applicable     Chronic    Chronic combined systolic and diastolic heart failure [I50.42] 06/14/2013 Yes    Pulmonary sarcoidosis [D86.0] 03/19/2013 Yes    Epilepsy [G40.909] 03/19/2013 Yes      Problems Resolved During this Admission:      No new Assessment & Plan notes have been filed under this hospital service since the last note was generated.  Service: Skilled Nursing Facility      Discharged Condition: good    Disposition: Home-Health Care [facility]    Follow Up:  Follow-up Information     Schedule an appointment as soon as possible for a visit with Carlos A Caputo MD.    Specialty:   "Internal Medicine  Why:  with in one week   Contact information:  [address]  [facility] [address]  [phone]             Schedule an appointment as soon as possible for a visit with Jj Altamirano MD.    Specialty:  Pulmonary Disease  Contact information:  [address]  [facility] [address]  [phone]             [facility].    Specialty:  DME Provider  Why:  Patient stated she uses [facility]. Orders faxed to [facility]  Contact information:  [address]  [address]  [place] [address]  [phone]             [facility].    Specialty:  DME Provider  Why:  All equipment will be delivered to Pt's home  Contact information:  [address] [facility] [address]  [phone]                 Patient Instructions:      3 IN 1 COMMODE FOR HOME USE     Order Specific Question Answer Comments   Type: Heavy duty drop arm    Height: 5' 8" (1.727 m)    Weight: 109.8 kg (242 lb 1 oz)    Does patient have medical equipment at home? bedside commode    Does patient have medical equipment at home? oxygen    Does patient have medical equipment at home? rollator    Length of need (1-99 months): 99      TRANSFER TUB BENCH FOR HOME USE     Order Specific Question Answer Comments   Type of Transfer Tub Bench: Unpadded heavy duty    Height: 5' 8" (1.727 m)    Weight: 109.8 kg (242 lb 1 oz)    Does patient have medical equipment at home? bedside commode    Does patient have medical equipment at home? oxygen    Does patient have medical equipment at home? rollator    Length of need (1-99 months): 99      SLIDING BOARD FOR HOME USE     Order Specific Question Answer Comments   Height: 5' 8" (1.727 m)    Weight: 109.8 kg (242 lb 1 oz)    Does patient have medical equipment at home? bedside commode    Does patient have medical equipment at home? oxygen    Does patient have medical equipment at home? rollator    Length of need (1-99 months): 99    Need for transfer? Yes  "     Ambulatory consult to Podiatry   Referral Priority: Routine Referral Type: Consultation   Referral Reason: Specialty Services Required   Requested Specialty: Podiatry   Number of Visits Requested: 1     Ambulatory Referral to Anticoagulation Monitoring (Renewal)   Referral Priority: Routine Referral Type: Consultation   Referral Reason: Specialty Services Required   Requested Specialty: Cardiology   Number of Visits Requested: 1     No driving until:   Order Comments: Cleared by PCP     Notify your health care provider if you experience any of the following:  temperature >100.4     Notify your health care provider if you experience any of the following:  persistent nausea and vomiting or diarrhea     Notify your health care provider if you experience any of the following:  severe uncontrolled pain     Notify your health care provider if you experience any of the following:  redness, tenderness, or signs of infection (pain, swelling, redness, odor or green/yellow discharge around incision site)     Notify your health care provider if you experience any of the following:  difficulty breathing or increased cough     Notify your health care provider if you experience any of the following:  persistent dizziness, light-headedness, or visual disturbances     Notify your health care provider if you experience any of the following:  increased confusion or weakness     Activity as tolerated     Medications:  Reconciled Home Medications:      Medication List      START taking these medications    miconazole NITRATE 2 % 2 % top powder  Commonly known as:  MICOTIN  Apply topically 2 (two) times daily.        CHANGE how you take these medications    blood sugar diagnostic Strp  1 each by Misc.(Non-Drug; Combo Route) route once daily.  What changed:  when to take this     lancets Misc  1 each by Misc.(Non-Drug; Combo Route) route once daily.  What changed:  when to take this     patiromer calcium sorbitex 16.8 gram  Pwpk  Commonly known as:  ALIA  Hold until follow-up with PCP  What changed:    · how much to take  · how to take this  · when to take this  · additional instructions     sevelamer carbonate 800 mg Tab  Commonly known as:  RENVELA  Take 1 tablet (800 mg total) by mouth daily with dinner or evening meal.  What changed:    · how much to take  · when to take this     warfarin 5 MG tablet  Commonly known as:  COUMADIN  Take 1 tablet (5mg)  by mouth every Tues, Thurs, Sat and 1/2 tablet (2.5mg) every Mon, Wed, Fri. And Sun.  What changed:  See the new instructions.        CONTINUE taking these medications    aspirin 81 MG EC tablet  Commonly known as:  ECOTRIN  Take 81 mg by mouth once daily.     atorvastatin 80 MG tablet  Commonly known as:  LIPITOR  TAKE 1 TABLET BY MOUTH EVERY EVENING     cinacalcet 30 MG Tab  Commonly known as:  SENSIPAR  Take 30 mg by mouth daily with breakfast.     clopidogrel 75 mg tablet  Commonly known as:  PLAVIX  TAKE 1 TABLET(75 MG) BY MOUTH EVERY DAY     fludrocortisone 0.1 mg Tab  Commonly known as:  FLORINEF  Take 1 tablet (100 mcg total) by mouth 2 (two) times daily.     levETIRAcetam 500 MG Tab  Commonly known as:  KEPPRA  TAKE 1 TABLET BY MOUTH TWICE DAILY     midodrine 10 MG tablet  Commonly known as:  PROAMATINE  Take 1 tablet (10 mg total) by mouth 3 (three) times daily.     nortriptyline 25 MG capsule  Commonly known as:  PAMELOR  Take 1 capsule by mouth nightly     omeprazole 20 MG capsule  Commonly known as:  PRILOSEC  TAKE 1 CAPSULE BY MOUTH EVERY DAY     prednisoLONE acetate 1 % Drps  Commonly known as:  PRED FORTE  INSTILL ONE DROP INTO  LEFT EYE THREE TIMES A DAY     predniSONE 20 MG tablet  Commonly known as:  DELTASONE  Take 1 tablet (20 mg total) by mouth once daily.  Start taking on:  7/2/2019     PROLENSA 0.07 % Drop  Generic drug:  bromfenac  INSTILL 1 DROP IN LEFT / RIGHT EYE QD     DENISE-LINDA 0.8 mg Tab  Generic drug:  B complex-vitamin C-folic acid  TAKE 1 TABLET  BY MOUTH ONCE DAILY     TRADJENTA 5 mg Tab tablet  Generic drug:  linaGLIPtin  TAKE 1 TABLET(5 MG) BY MOUTH EVERY DAY        STOP taking these medications    cefTRIAXone 2 g in dextrose 5 % 50 mL 2 g/50 mL Pgbk IVPB  Commonly known as:  ROCEPHIN     gabapentin 600 MG tablet  Commonly known as:  NEURONTIN     pregabalin 100 MG capsule  Commonly known as:  LYRICA     tiZANidine 4 MG tablet  Commonly known as:  ZANAFLEX          Time spent on the discharge of patient: 36 minutes    Ivette Sebastian NP  Department of Hospital Medicine  [facility]

## 2019-07-01 NOTE — LETTER
July 1, 2019      Carlos A Caputo MD  [address]  [facility] [address]           [facility] [place]  [address]  [facility] [address]  Phone: [phone]          Patient: Sisi Medel   MR Number: [id number]   YOB: 1960   Date of Visit: 7/1/2019       Dear Dr. Carlos A Caputo:    Thank you for referring Sisi Medel to me for evaluation. Attached you will find relevant portions of my assessment and plan of care.    If you have questions, please do not hesitate to call me. I look forward to following Sisi Medel along with you.    Sincerely,    Annette Hernandez, IVETH    Enclosure  CC:  No Recipients    If you would like to receive this communication electronically, please contact [email or web address] or [phone] to request more information on [employer] access.    For providers and/or their staff who would like to refer a patient to [facility], please contact us through our one-stop-shop provider referral line, [employer] , at [phone].    If you feel you have received this communication in error or would no longer like to receive these types of communications, please e-mail [email or web address]

## 2019-07-01 NOTE — PROGRESS NOTES
Subjective:      Patient ID: Sisi Medel is a 58 y.o. female.    Chief Complaint:  Diabetes    History of Present Illness  Sisi Medel presents today for follow up of secondary adrenal insufficiency. Previous patient of Dr. Broussard. Last seen in 3/2017. This is her first visit with me.     She is currently admitted into the rehab facility at [facility].   She was admitted to the hospital for SOB and diabetic foot ulcers.   Seen in the hospital by Dr. Arriola     Previously seen by endocrine in hospital in Feb 2017 with concern for adrenal insufficiency.  Medical history includes sarcoidosis, atrial fib, seizures.     Currently on prednisone 20 mg. Has been on steroids for at least 9 years per pulmonary, sees Dr. Harvey.     Takes midodrine 30 mg daily  Dialysis t, th ,sat   Currently on florinef 0.2mg daily.  States that bp was low when off fludrocortisone and had been symptomatic.     Denies dizziness.   Denies fractures, no family history of osteoporotic fractures. Menopause at age 53.     Vit d insufficiency currently on vit d 1000 IU daily     Hyperlipidemia currently on lipitor 80mg. ldl at goal from 5/2016     Morbid obesity BMI 40. Plans to restart at gym.  Tradjenta 5 mg daily outside of hospital- currently she is getting levemir 8u HS and novolog on a sliding scale while in the rehab facility.     Used to work as manager at [employer]    bmd done 4/2017:  BONE MINERAL DENSITY RESULTS:  Lumbar Spine: Lumbar bone mineral density L1-L4 is 1.185g/cm2, which is a t-score of 1.3. The z-score is 1.5.    Total Hip: The total hip bone mineral density is 0.810g/cm2.  The t-score is -1.1, and the z-score is -0.8.  Femoral neck BMD is 0.835g/cm2 and the t-score is -0.1.      Impression     Osteopenia of the total hip.   RECOMMENDATIONS:  1) Adequate calcium and Vitamin D therapy  2) Appropriate exercise  3) Consider repeat BMD in 2 - 4 years.  4) Glucocorticoids may adversely affect  quality and quantity of bone     Review of Systems   Constitutional: Negative for unexpected weight change.   Eyes: Negative for visual disturbance.   Respiratory: Negative for shortness of breath.         On oxygen via nasal cannula   Cardiovascular: Negative for chest pain.   Gastrointestinal: Negative for abdominal pain.   Endocrine: Negative for cold intolerance, heat intolerance, polydipsia, polyphagia and polyuria.   Musculoskeletal: Positive for gait problem (wheelchair). Negative for arthralgias.   Skin: Negative for wound.   Neurological: Negative for headaches.   Hematological: Does not bruise/bleed easily.   Psychiatric/Behavioral: Negative for sleep disturbance.     Objective:   Physical Exam   Neck: No thyromegaly present.   Cardiovascular: Normal rate.   Pulmonary/Chest: Effort normal.   Abdominal: Soft.   Feet:   Right Foot:   Skin Integrity: Positive for skin breakdown (bilateral feet wrapped).   Left Foot:   Skin Integrity: Positive for skin breakdown.   Vitals reviewed.    Body mass index is 36.54 kg/m².    Lab Review:   Lab Results   Component Value Date    HGBA1C 6.5 (H) 05/30/2019     Lab Results   Component Value Date    CHOL 169 12/08/2017    HDL 58 12/08/2017    LDLCALC 91.4 12/08/2017    TRIG 98 12/08/2017    CHOLHDL 34.3 12/08/2017     Lab Results   Component Value Date     (L) 06/27/2019    K 4.6 06/27/2019    CL 88 (L) 06/27/2019    CO2 23 06/27/2019     (H) 06/27/2019    BUN 77 (H) 06/27/2019    CREATININE 6.6 (H) 06/27/2019    CALCIUM 8.4 (L) 06/27/2019    PROT 7.2 06/27/2019    ALBUMIN 3.3 (L) 06/27/2019    BILITOT 0.7 06/27/2019    ALKPHOS 102 06/27/2019    AST 34 06/27/2019    ALT 24 06/27/2019    ANIONGAP 23 (H) 06/27/2019    ESTGFRAFRICA 7.4 (A) 06/27/2019    EGFRNONAA 6.4 (A) 06/27/2019    TSH 1.539 03/10/2019     Vit D, 25-Hydroxy   Date Value Ref Range Status   11/12/2018 31 30 - 96 ng/mL Final     Comment:     Vitamin D deficiency.........<10 ng/mL                               Vitamin D insufficiency......10-29 ng/mL       Vitamin D sufficiency........> or equal to 30 ng/mL  Vitamin D toxicity............>100 ng/mL       Assessment and Plan     1. Type 2 diabetes mellitus with diabetic autonomic neuropathy, without long-term current use of insulin     2. Current chronic use of systemic steroids     3. Diabetic ulcer of both feet     4. Morbid obesity with BMI of 40.0-44.9, adult     5. Mixed hyperlipidemia     6. Vitamin D insufficiency     7. Secondary adrenal insufficiency     8. Idiopathic hypotension     9. Osteopenia determined by x-ray     10. Anemia in ESRD (end-stage renal disease)     11. Multinodular thyroid  US Soft Tissue Head Neck Thyroid     Current chronic use of systemic steroids  Her hypotension should have resolved with an increased dose of prednisone if it was due to adrenal insufficiency, so this doesn't appear to be the case.  I do not think the hypotension is related to adrenal insufficiency. She is on a much higher dose of steroids than what she requires, so any residual hypotension must be related to another etiology.  If she gets down to 5 mg of prednisone please alert us and we can assess further.   Continue current doses.     Diabetes mellitus, type 2  Continue insulin doses while in the rehab center.  Restart tradjenta when she is sent home, alerted pt if her BG >180 consistently to alert me and we may have to start insulin.     Diabetic ulcer of both feet  -- Continue following with podiatry    Morbid obesity with BMI of 40.0-44.9, adult  Recommend weight loss, diet. Plans to restart at gym.    Hyperlipidemia  LDL at goal on lipitor 80mg    Vitamin D insufficiency  Continue vit d 1000 IU daily    Secondary adrenal insufficiency  See above    Idiopathic hypotension   Continue midodrine and florinef as managed by nephrology    Osteopenia determined by x-ray  Discussed fall precautions  Continue vitamin d daily    Anemia in ESRD (end-stage renal  disease)  Alters A1c    Multinodular thyroid  TSH normal at this time.  No thyroid ultrasound on file.   Per CTA of head and neck in 2017- Large thyroid gland with several nodules. Recommend further nonemergent evaluation with ultrasound.  Schedule thyroid ultrasound    Case discussed with Dr. avina  Recommendations were discussed with the patient in detail  The patient verbalized understanding and agrees with the plan outlined as above.

## 2019-07-01 NOTE — PROGRESS NOTES
Patient admitted 6/10/19 - 7/1/19 for respiratory failure then transferred to SNF.  Calendar updated on doses.  Patient states she was discharged with instructions to hold Coumadin, no changes to medications, and that the hospital sent an order to  to do INR 7/2/19.  I called [facility] and the patient was discharged from their service on 5/30/19. Chart routed to pharmacist to review

## 2019-07-01 NOTE — HOSPITAL COURSE
Patient progressed well with PT and OT. Patient had no significant events during their stay at SNF. Home health was set up. DME was ordered if needed. Follow up appointment have been set up. All prescriptions and discharge instructions were given to patient.      PEx  Constitutional: Patient obese and in no distress   Head: Normocephalic and atraumatic.   Eyes: Pupils are equal, round, and reactive to light.   Neck: Normal range of motion. Neck supple.   Cardiovascular: Normal rate, regular rhythm and normal heart sounds.    Pulmonary/Chest: Effort normal and breath sounds are clear, O2 via NC   Abdominal: Soft. Bowel sounds are normal.   Musculoskeletal: Normal range of motion.   Neurological: Alert and oriented to person, place, and time.   Skin: Skin is warm and dry.  Moisture associated dermatitis noted to underneath breasts and underneath the abdominal fold, mild.  Ulcers to left and right foot, +  R foot wound from scratch, not bleeding anymore. LUE AVF + thrill and bruit   Psychiatric: Normal mood and affect. Behavior is normal.

## 2019-07-01 NOTE — PROGRESS NOTES
Patient back from appointment, ndn, patient verbalize she wants to stay on wheel chair at bedside.call light in reach.no recommendations in pt's folder.

## 2019-07-01 NOTE — HPI
Ms. Medel is a 58 year old female with PMHx of pulmonary sarcoidosis on 3-5L home oxygen, Class 3 HFrEF (3/2019 EF 35%), perm Afib/flutter s/p 6/2017 AVN ablation & PPM, ESRD (HD TTS), Type 2 DM (3/2019 A1c 6.2%), pulmonary HTN, YOANDY, and orthostatic hypotension who presents to SNF following a hospitalization for fluid overload and respiratory decompensation      Patient originally presented to [facility] with complaints of shortness of breath on 5/30. The patient went to dialysis that day but could not get down to her dry weight.  Per nephrology, should be 111kg but patient only got to 112. Per the patient and her family these symptoms have been persistent for several weeks. She was discharged from [facility] the day prior -5/29/19 following a 2 day admission with similar complaints. At that time the patient needed dialysis for volume overload and new dry weight was established.     Of note patient has had frequent hospitalizations in the recent past. These admissions are typically due to volume overload, CHF exacerbation, and shortness of breath. She follows with pulmonology as an outpatient and is on chronic steroids. Per last outpatient note from Dr. Altamirano on 5/23/19, continued on prednisone 20mg daily for sarcoid as she has had difficulty and increased hospitalizations on lower doses.     In the ED the patient was on BiPAP, chest Xray showing significant pulmonary edema. Nephology was consulted and started dialysis in room. Her work of breathing significantly increased and she was admitted to the ICU. Blood cultures grew Klebsiella oxytoca and Streptococcus salivarius, and she was started on IV Vancomycin and Zosyn. Vancomycin was discontinued on 6/2.  Her respiratory status improved after admission to the ICU and further dialysis, and she was stepped down to hospital medicine on 6/2. A CT Chest/abd/pelvis was  was unremarkable. Infectious disease was consulted on 6/2 and recommended two week course of ceftriaxone which  will end on  6/12/19      Patient will be treated at [facility] with PT and OT to improve functional status and ability to perform ADLs.

## 2019-07-02 NOTE — PROGRESS NOTES
Patient called and was advised of coumadin instructions: 7/02-2.5mg, 7/03 -5mg, 7/04 -2.5mg and get lab drawn early am 7/05, Patient verbalized understanding, appointment booked

## 2019-07-02 NOTE — PROGRESS NOTES
INR not at goal. Medications, chart, and patient findings reviewed. See calendar for adjustments to dose and follow up plan.  Pt held dose 7/1, will resume a lower dose with close follow post hospital discharge.
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
## 2019-07-03 PROBLEM — S06.5XAA SDH (SUBDURAL HEMATOMA): Status: ACTIVE | Noted: 2019-01-01
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
## 2019-07-03 PROBLEM — E66.9 OBESE: Status: ACTIVE | Noted: 2019-01-01

## 2019-07-03 NOTE — NURSING
Patient arrived to [facility] from ED via Ambulance     Type of stroke/diagnosis:  SDH    TPA start and end time n/a    Thrombectomy start and end time n/a    Current symptoms: L leg weakness and diya numbness, headache    Skin assessment done: Yes  Wounds noted: R and L foot wounds, R and L big toe ulcers, L thigh skin tear, bruising to R hip R head, R shoulder, L leg, L knee     NCC notified: Eugenia NP notified

## 2019-07-03 NOTE — HOSPITAL COURSE
7/3: Patient admitted to [facility] s/p fall with R SDH, pt at home on coumadin and plavix. PCC, DDAVP and Vit K initiated. -140. Pending repeat coags. Nephrology consulted for HD. Pending repeat CT head, NSGY following -pending further reccs

## 2019-07-03 NOTE — SUBJECTIVE & OBJECTIVE
Past Medical History:   Diagnosis Date    Anemia in ESRD (end-stage renal disease) 3/7/2016    Anticoagulant long-term use     Cervical radiculopathy 2015    CHF (congestive heart failure)     Chronic combined systolic and diastolic heart failure 2013    EF 20% , improved with Medical therapy, negative PET     Chronic respiratory failure with hypoxia 2013    On home oxygen at 2-5 liters    Closed fracture of proximal end of right fibula 2016    Complications due to renal dialysis device, implant, and graft     DDD (degenerative disc disease), lumbar 2015    Dependence on renal dialysis     Diabetes mellitus type II, controlled 2017    ESRD on hemodialysis 2016    Essential hypertension     Gout     Lateral meniscal tear 2016    Lumbar stenosis 2015    Obesity, Class III, BMI 40-49.9 (morbid obesity)     Pacemaker     Paroxysmal atrial fibrillation 2014    Not on anticoagulation    Peripheral neuropathy 2013    Personal history of gastric ulcer 3/19/2013    Pneumonia of left lower lobe due to infectious organism 3/10/2019    Sarcoidosis, lung 2009    Diagnosed in  with ocular manifestation, on 4L home O2 and PO steroids    Secondary pulmonary hypertension 2015    Seizure disorder 3/19/2013    Shingles 2013    Thyroid disease        Past Surgical History:   Procedure Laterality Date    ABDOMINAL SURGERY      ABLATION N/A 2017    Performed by [facility] at [facility] CATH LAB    ANGIOPLASTY Left 1/10/2018    Performed by Torrey [facility]afana MD at [facility] OR 2ND FLR    ANGIOPLASTY-PERCUTANEOUS TRANSLUMINAL (PTA) Left 2017    Performed by [facility] at [facility] OR 2ND FLR    ANGIOPLASTY-PERCUTANEOUS TRANSLUMINAL (PTA) Left 10/12/2016    Performed by [facility] at [facility] OR 2ND FLR    CARDIAC PACEMAKER PLACEMENT       SECTION      x2    DECLOT GRAFT PERCUTANEOUS Left  2/7/2017    Performed by [facility] at [facility] OR 2ND FLR    DECLOT-GRAFT Left 6/21/2016    Performed by Harjit Starr MD at [facility] CATH LAB    DECLOT-GRAFT Left 6/20/2016    Performed by [facility]it Starr [facility] at [facility] CATH LAB    ECHOCARDIOGRAM-TRANSESOPHAGEAL N/A 6/27/2013    Performed by Delmy Surgeon at [facility] DELMY    Fistulogram Left 4/8/2019    Performed by [facility] CATH LAB    fistulogram Left 1/10/2018    Performed by Tor[facility]ana MD at [facility] OR 2ND FLR    FISTULOGRAM Left 9/13/2017    Performed by [facility] at [facility] CATH LAB    FISTULOGRAM Left 10/12/2016    Performed by Tor[facility] at [facility] OR 2ND FLR    FISTULOGRAM Left 6/21/2016    Performed by Harjit Starr MD at [facility] CATH LAB    Fistulogram, LUE AVG, possible intervention Left 1/30/2019    Performed by Tor[facility] at [facility] CATH LAB    Fistulogram, OR 11 Left 5/8/2019    Performed by Tor[facility] at [facility] OR 2ND FLR    INJECTION-STEROID-EPIDURAL-TRANSFORAMINAL Right 7/20/2015    Performed by Pat[facility]rez [facility] at [facility] PAIN MGT    INJECTION-STEROID-EPIDURAL-TRANSFORAMINAL Right 5/21/2015    Performed by Patria [facility]rez MD at [facility] PAIN MGT    [id number]-VCOTN-TIORRHJTFWMJY / Left upper AV graft. Left 2/3/2016    Performed by Tor[facility] at [facility] OR 2ND FLR    FPDXJHUDI-RFBDKWNNN-WNNVIRSOUZCUZ N/A 1/11/2017    Performed by Bladimir Adorno MD at [facility] CATH LAB    OTHER SURGICAL HISTORY      loop recorder implant    PLACEMENT-STENT Left 2/7/2017    Performed by [facility] at [facility] OR 2ND FLR    PTA, AV FISTULA N/A 1/30/2019    Performed by Tor[facility] at [facility] CATH LAB    stent to fistula      TRANSESOPHAGEAL ECHOCARDIOGRAM (JARAD) N/A 6/27/2016    Performed by Sourav Machuca MD at [facility] CATH LAB    ULTRASOUND, UPPER GI TRACT, ENDOSCOPIC N/A 1/9/2014    Performed by Stewart Burgess MD at [facility] ENDO (2ND FLR)    VASCULAR SURGERY       fistula to L upper arm        Review of patient's allergies indicates:   Allergen Reactions    Bactrim [sulfamethoxazole-trimethoprim] Other (See Comments)     Causes renal failure    Nsaids (non-steroidal anti-inflammatory drug) Other (See Comments)     Renal failure     Current Facility-Administered Medications   Medication Frequency    acetaminophen tablet 1,000 mg ED 1 Time    acetaminophen tablet 650 mg Q4H PRN    atorvastatin tablet 80 mg QHS    dextrose 10% (D10W) Bolus PRN    fludrocortisone tablet 200 mcg BID    glucagon (human recombinant) injection 1 mg PRN    insulin aspart U-100 pen 1-10 Units Q6H PRN    levETIRAcetam tablet 500 mg BID    midodrine tablet 10 mg TID    ondansetron disintegrating tablet 8 mg Q8H PRN    predniSONE tablet 20 mg Daily     Family History     Problem Relation (Age of Onset)    Coronary artery disease Father    Diabetes Mother, Father, Maternal Grandmother    Hypertension Mother, Father    Kidney failure Mother    Lupus Sister        Tobacco Use    Smoking status: Former Smoker     Packs/day: 0.50     Years: 10.00     Pack years: 5.00     Types: Cigarettes     Last attempt to quit: 1994     Years since quittin.2    Smokeless tobacco: Never Used   Substance and Sexual Activity    Alcohol use: No     Alcohol/week: 0.0 oz     Comment: rarely    Drug use: No    Sexual activity: Not Currently     Review of Systems   Unable to perform ROS: Mental status change     Objective:     Vital Signs (Most Recent):  Temp: 97.5 °F (36.4 °C) (19)  Pulse: 69 (19 1545)  Resp: (!) 29 (19 154)  BP: (!) 91/52 (19 1545)  SpO2: 95 % (19 1545)  O2 Device (Oxygen Therapy): room air (19) Vital Signs (24h Range):  Temp:  [97.5 °F (36.4 °C)] 97.5 °F (36.4 °C)  Pulse:  [68-71] 69  Resp:  [16-36] 29  SpO2:  [89 %-100 %] 95 %  BP: ()/(52-87) 91/52     Weight: 110 kg (242 lb 8.1 oz) (19)  Body mass index is 36.87  kg/m².  Body surface area is 2.3 meters squared.    No intake/output data recorded.    Physical Exam   Constitutional:   Alert, critically ill, hypoactive, drowsy   HENT:   Head: Normocephalic.   Eyes: Pupils are equal, round, and reactive to light. EOM are normal.   Neck: Neck supple.   Cardiovascular: Normal rate.   Pulmonary/Chest: Effort normal. No respiratory distress. She has no wheezes.   Abdominal: Soft. Bowel sounds are normal.   Musculoskeletal: She exhibits no edema.   Neurological: She is alert.   Difficult to arouse, answers questions but very sleepy   Skin: Skin is warm.   Nursing note and vitals reviewed.      Significant Labs:  CBC:   Recent Labs   Lab 07/03/19  1127   WBC 11.04   RBC 3.01*   HGB 7.9*   HCT 26.9*      MCV 89   MCH 26.2*   MCHC 29.4*     CMP:   Recent Labs   Lab 07/03/19  1127   *   CALCIUM 8.0*   ALBUMIN 3.1*   PROT 6.5   *   K 3.9   CO2 30*   CL 88*   BUN 39*   CREATININE 5.3*   ALKPHOS 119   ALT 55*   AST 47*   BILITOT 1.0     All labs within the past 24 hours have been reviewed.    Significant Imaging:  CXR personally reviewed.\
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
## 2019-07-03 NOTE — ASSESSMENT & PLAN NOTE
58 F with pulmonary sarcoidosis on 3-5L home oxygen, Class 3 HFrEF (3/2019 EF 35%), perm Afib/flutter s/p 6/2017 AVN ablation & PPM, ESRD (TTS HD via AVF, unknown baseline Cr), Type 2 DM (3/2019 A1c 6.2%), pulmonary HTN, YOANDY, and orthostatic hypotension that presented to ED s/p fall from wheelchair with acute R SDH.     Admit to NCC  Q1h Neurochecks  CBC, CMP, Coags, T&S  HOB >30  SBP <140; cardene prn  A-line if needed  Na 135-150   Reverse anti-plt/coag medication - given K-Centra in ED  Keppra 500 BID x2 weeks  NPO  Follow-up [facility] in 6h (ordered for 1600)  Remainder of medical management per primary team

## 2019-07-03 NOTE — ASSESSMENT & PLAN NOTE
--s/p fall at home from wheelchair  --XR alia pelvis-No acute displaced fracture-dislocation identified.  --XR L femur-Visualized osseous structures appear intact, with no definite evidence of recent fracture or other significant abnormality identified.  -XR L knee-No evidence of recent fracture or other significant detrimental interval change since the examinations referenced above.  Old healed left proximal fibular shaft fracture is noted.   --fall precautions  --PT/OT
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
## 2019-07-03 NOTE — ASSESSMENT & PLAN NOTE
SDH s/p fall from wheel chair  --  Continue Neuro checks q 1hr  --  Neurosurgery consulted-appreciate reccs  -- CT Head reveals--Interval development of a thin hyperdense extra-axial collection overlying the right tentorium cerebella most compatible with acute subdural hemorrhage.  No significant mass effect or midline shift.  --  Pending repeat CTH  --  Pt was on Coumadin and Plavix at home, INR on admit 3.4, pending repeat PT-INR  --  follow PT INR daily  --PCC, DDAVP and vit. K received  --  PT/OT/SLP  --  SBP goal <140, MAP >60

## 2019-07-03 NOTE — ASSESSMENT & PLAN NOTE
Outpatient HD unit: [facility]   -Nephrologist: Patient does not know  -HD tx days: TThS  -HD tx time: 4hrs 45mins    -HD access: LUE AVG  -HD modality: iHD  -Residual urine: Minimal  -EDW: 109 kg    Plan:  - No RRT needed at this time; last HD treatment prior to admission 7/2/2019  - Will provide RRt tomorrow  - Strict I/Os and chart  - Neuro checks by primary team  - MAP > 65  - Hb > 7 gm/dL, to target 10 gm/dL for ESRD/CKD patients  - Will follow closely

## 2019-07-03 NOTE — ED PROVIDER NOTES
Encounter Date: 7/3/2019       History     Chief Complaint   Patient presents with    Fall     pt was in w/c being assisted down ramp, w/c went off ramp into grass approx 1/2 way down. Pt reports she hit head, unknown loc. Pt is on coumadin, dialysis pt. Pt c/o left leg pain, head pain. GCS 15.      Ms Medel is a 59 yo female patient with PMHx of pulmonary sarcoidosis on 3-5L home oxygen, Class 3 HFrEF (3/2019 EF 35%), perm Afib/flutter s/p 6/2017 AVN ablation & PPM, ESRD (TTS), Type 2 DM (3/2019 A1c 6.2%), pulmonary HTN, YOANDY, and orthostatic hypotension that presents to the ED via EMS after a fall from her wheelchair at home. EMS reports that a family member was pushing her down a wheelchair ramp when they lost control and the patient fell off the side of the ramp out of her wheelchair into the grass. Pt reports brief LOC. Complaining of headache, blurry vision, dizziness, mid and low back pain, left hip and proximal LLE pain.         Review of patient's allergies indicates:   Allergen Reactions    Bactrim [sulfamethoxazole-trimethoprim] Other (See Comments)     Causes renal failure    Nsaids (non-steroidal anti-inflammatory drug) Other (See Comments)     Renal failure     Past Medical History:   Diagnosis Date    Anemia in ESRD (end-stage renal disease) 3/7/2016    Anticoagulant long-term use     Cervical radiculopathy 7/20/2015    CHF (congestive heart failure)     Chronic combined systolic and diastolic heart failure 6/14/2013    EF 20% 2013, improved with Medical therapy, negative PET 2013    Chronic respiratory failure with hypoxia 04/22/2013    On home oxygen at 2-5 liters    Closed fracture of proximal end of right fibula 6/27/2016    Complications due to renal dialysis device, implant, and graft     DDD (degenerative disc disease), lumbar 6/17/2015    Dependence on renal dialysis     Diabetes mellitus type II, controlled 12/8/2017    ESRD on hemodialysis 2/23/2016    Essential  hypertension     Gout     Lateral meniscal tear 2016    Lumbar stenosis 2015    Obesity, Class III, BMI 40-49.9 (morbid obesity)     Pacemaker     Paroxysmal atrial fibrillation 2014    Not on anticoagulation    Peripheral neuropathy 2013    Personal history of gastric ulcer 3/19/2013    Pneumonia of left lower lobe due to infectious organism 3/10/2019    Sarcoidosis, lung 2009    Diagnosed in  with ocular manifestation, on 4L home O2 and PO steroids    Secondary pulmonary hypertension 2015    Seizure disorder 3/19/2013    Shingles 2013    Thyroid disease      Past Surgical History:   Procedure Laterality Date    ABDOMINAL SURGERY      ABLATION N/A 2017    Performed by Bladimir Adorno MD at [facility] CATH LAB    ANGIOPLASTY Left 1/10/2018    Performed by Torrey Villafana MD at [facility] OR 2ND FLR    ANGIOPLASTY-PERCUTANEOUS TRANSLUMINAL (PTA) Left 2017    Performed by Torrey [facility]afana MD at [facility] OR 2ND FLR    ANGIOPLASTY-PERCUTANEOUS TRANSLUMINAL (PTA) Left 10/12/2016    Performed by Torrey Villafana MD at [facility] OR 2ND FLR    CARDIAC PACEMAKER PLACEMENT       SECTION      x2    DECLOT GRAFT PERCUTANEOUS Left 2017    Performed by Torrey Villafana MD at [facility] OR 2ND FLR    DECLOT-GRAFT Left 2016    Performed by Harjit Starr MD at [facility] CATH LAB    DECLOT-GRAFT Left 2016    Performed by Harjit Starr MD at [facility] CATH LAB    ECHOCARDIOGRAM-TRANSESOPHAGEAL N/A 2013    Performed by Delmy Surgeon at [facility] DELMY    Fistulogram Left 2019    Performed by Torrey [facility]afana MD at [facility] CATH LAB    fistulogram Left 1/10/2018    Performed by Torrey Villafana MD at [facility] OR 2ND FLR    FISTULOGRAM Left 2017    Performed by Tor[facility]afana MD at [facility] CATH LAB    FISTULOGRAM Left 10/12/2016    Performed by Torrey Villafana MD at [facility] OR 2ND FLR    FISTULOGRAM Left 2016    Performed by Harjit MANCUSO  MD Matty at [facility] CATH LAB    Fistulogram, LUE AVG, possible intervention Left 2019    Performed by Torrey Villafana MD at [facility] CATH LAB    Fistulogram, OR 11 Left 2019    Performed by Torrey Villafana MD at [facility] OR 2ND FLR    INJECTION-STEROID-EPIDURAL-TRANSFORAMINAL Right 2015    Performed by Patria Gutierrez MD at [facility] MGT    INJECTION-STEROID-EPIDURAL-TRANSFORAMINAL Right 2015    Performed by Patria Gutierrez MD at [facility] MGT    [id number]-HSQEA-NWDKZZWAGJKMK / Left upper AV graft. Left 2/3/2016    Performed by Torrey Villafana MD at [facility] OR 2ND FLR    TFJQUXFBH-LLKGCTOAM-PZRPAHRUCEYAW N/A 2017    Performed by Bladimir Adorno MD at [facility] CATH LAB    OTHER SURGICAL HISTORY      loop recorder implant    PLACEMENT-STENT Left 2017    Performed by Torrey Villafana MD at [facility] OR 2ND FLR    PTA, AV FISTULA N/A 2019    Performed by Torrey Villafana MD at [facility] CATH LAB    stent to fistula      TRANSESOPHAGEAL ECHOCARDIOGRAM (JARAD) N/A 2016    Performed by Sourav Machuca MD at [facility] CATH LAB    ULTRASOUND, UPPER GI TRACT, ENDOSCOPIC N/A 2014    Performed by Stewart Burgess MD at [facility] ENDO (2ND FLR)    VASCULAR SURGERY      fistula to L upper arm      Family History   Problem Relation Age of Onset    Kidney failure Mother     Hypertension Mother     Diabetes Mother     Coronary artery disease Father     Hypertension Father     Diabetes Father     Lupus Sister     Diabetes Maternal Grandmother     Colon cancer Neg Hx     Ovarian cancer Neg Hx     Breast cancer Neg Hx      Social History     Tobacco Use    Smoking status: Former Smoker     Packs/day: 0.50     Years: 10.00     Pack years: 5.00     Types: Cigarettes     Last attempt to quit: 1994     Years since quittin.2    Smokeless tobacco: Never Used   Substance Use Topics    Alcohol use: No     Alcohol/week: 0.0 oz     Comment: rarely    Drug use: No     Review of  Systems   Constitutional: Negative for chills and fever.   HENT: Positive for sore throat. Negative for congestion and rhinorrhea.    Eyes: Positive for visual disturbance. Negative for pain.   Respiratory: Positive for shortness of breath (chronic on home oxygen).    Cardiovascular: Negative for chest pain.   Gastrointestinal: Negative for abdominal pain, diarrhea, nausea and vomiting.   Genitourinary: Negative for flank pain.   Musculoskeletal: Positive for arthralgias and myalgias.   Skin: Positive for wound.   Neurological: Positive for dizziness, light-headedness and headaches. Negative for facial asymmetry, speech difficulty, weakness and numbness.   Hematological: Bruises/bleeds easily (on coumadin and plavix).   Psychiatric/Behavioral: Negative for confusion.       Physical Exam     Initial Vitals [07/03/19 0939]   BP Pulse Resp Temp SpO2   116/87 70 18 97.5 °F (36.4 °C) 98 %      MAP       --         Physical Exam    Constitutional: She appears well-developed. She is Obese . She is cooperative.  Non-toxic appearance. No distress.   HENT:   Head: Head is with abrasion and with contusion. Head is without raccoon's eyes and without laceration.       Eyes: Conjunctivae and EOM are normal. Pupils are equal, round, and reactive to light.   Neck: Normal range of motion. Neck supple. Muscular tenderness present. No spinous process tenderness present.   Cardiovascular: Normal rate and intact distal pulses. Exam reveals no gallop and no friction rub.    No murmur heard.  Pulses:       Dorsalis pedis pulses are 2+ on the right side, and 2+ on the left side.   Pulmonary/Chest: Effort normal. No respiratory distress. She exhibits no tenderness, no bony tenderness, no crepitus and no deformity.   Abdominal: Soft. There is no tenderness. There is no rigidity, no rebound, no guarding and no CVA tenderness.   Musculoskeletal:        Left hip: She exhibits tenderness and bony tenderness. She exhibits no deformity.         Thoracic back: She exhibits tenderness, bony tenderness and pain.        Lumbar back: She exhibits tenderness, bony tenderness and pain.        Left upper leg: She exhibits tenderness and bony tenderness. She exhibits no deformity.        Legs:  Neurological: She is alert.         ED Course   Procedures  Labs Reviewed   CBC W/ AUTO DIFFERENTIAL - Abnormal; Notable for the following components:       Result Value    RBC 3.01 (*)     Hemoglobin 7.9 (*)     Hematocrit 26.9 (*)     Mean Corpuscular Hemoglobin 26.2 (*)     Mean Corpuscular Hemoglobin Conc 29.4 (*)     RDW 21.5 (*)     Immature Granulocytes 1.8 (*)     Gran # (ANC) 8.6 (*)     Immature Grans (Abs) 0.20 (*)     Lymph # 0.8 (*)     Mono # 1.3 (*)     nRBC 2 (*)     Gran% 77.4 (*)     Lymph% 7.5 (*)     All other components within normal limits   PROTIME-INR - Abnormal; Notable for the following components:    Prothrombin Time 32.7 (*)     INR 3.4 (*)     All other components within normal limits   COMPREHENSIVE METABOLIC PANEL - Abnormal; Notable for the following components:    Sodium 135 (*)     Chloride 88 (*)     CO2 30 (*)     Glucose 113 (*)     BUN, Bld 39 (*)     Creatinine 5.3 (*)     Calcium 8.0 (*)     Albumin 3.1 (*)     AST 47 (*)     ALT 55 (*)     Anion Gap 17 (*)     eGFR if  9.6 (*)     eGFR if non  8.3 (*)     All other components within normal limits   PHOSPHORUS - Abnormal; Notable for the following components:    Phosphorus 5.1 (*)     All other components within normal limits    Narrative:     ADD MAGNESIUM [id number] AND PHOSPHORUS [id number]. PER AARON ARORA RN 07/03/2019  13:28    HEMOGLOBIN A1C - Abnormal; Notable for the following components:    Hemoglobin A1C 6.1 (*)     All other components within normal limits    Narrative:     ADD MAGNESIUM [id number] AND PHOSPHORUS [id number]. PER AARON ARORA RN 07/03/2019  13:28   add on ghgb [id number] per wes turpin md  07/03/2019  13:47    ADD-ON TSH #[id number] PER TANIYA LYNCH MD 13:53  07/03/2019    HEMOGLOBIN A1C   MAGNESIUM   PHOSPHORUS   TSH   MAGNESIUM    Narrative:     ADD MAGNESIUM [id number] AND PHOSPHORUS [id number]. PER AARON ARORA RN 07/03/2019  13:28    TSH    Narrative:     ADD MAGNESIUM [id number] AND PHOSPHORUS [id number]. PER AARON ARORA RN 07/03/2019  13:28   add on ghgb [id number] per wes turpin md  07/03/2019  13:47   ADD-ON TSH #[id number] PER TANIYA LYNCH MD 13:53  07/03/2019    PHOSPHORUS   MAGNESIUM   POCT GLUCOSE   POCT GLUCOSE MONITORING CONTINUOUS        ECG Results          EKG 12-lead (Final result)  Result time 07/03/19 17:01:38    Final result by Interface, Lab In [facility] (07/03/19 17:01:38)                 Narrative:    Test Reason : (Not Selected)    Vent. Rate : 077 BPM     Atrial Rate : 046 BPM     P-R Int : 000 ms          QRS Dur : 156 ms      QT Int : 538 ms       P-R-T Axes : 000 -72 093 degrees     QTc Int : 608 ms      Ventricular-paced rhythm with frequent Premature ventricular complexes  Biventricular hypertrophy  Atrial fibrillation present  Abnormal ECG  When compared with ECG of 03-JUN-2019 07:51,  Sinus rhythm is no longer with A-V dissociation  Vent. rate has increased BY  12 BPM  Confirmed by RIK BALLARD MD ([id number]) on 7/3/2019 5:01:29 PM    Referred By: [email or web address]   SELF           Confirmed By:RIK BALLARD MD                            Imaging Results          X-Ray Hips Bilateral 2 View Incl AP Pelvis (Final result)  Result time 07/03/19 12:45:58    Final result by Jeancarlos Kohler MD (07/03/19 12:45:58)                 Impression:      No acute displaced fracture-dislocation identified.      Electronically signed by: Jeancarlos Kohler MD  Date:    07/03/2019  Time:    12:45             Narrative:    EXAMINATION:  XR HIPS BILATERAL 2 VIEW INCL AP PELVIS    TECHNIQUE:  AP view of the pelvis and frogleg lateral views of both hips were performed.    COMPARISON:  CT chest, abdomen and  pelvis 06/01/2019    FINDINGS:  Overall alignment is within normal limits.  No displaced fracture, dislocation or destructive osseous process.  Stable small soft tissue calcification medial to the left femoral neck.  Age-related degenerative changes at the imaged lower lumbosacral spine, pubic symphysis and bilateral sacroiliac and femoroacetabular joints.  Scattered atherosclerotic vascular calcifications and pelvic phleboliths noted.  No subcutaneous emphysema or radiodense retained foreign body.                               X-Ray Femur Ap/Lat Left (Final result)  Result time 07/03/19 12:55:10    Final result by Rohith Carrillo MD (07/03/19 12:55:10)                 Impression:      As above      Electronically signed by: Rohith Carrillo MD  Date:    07/03/2019  Time:    12:55             Narrative:    EXAMINATION:  XR FEMUR 2 VIEW LEFT    TECHNIQUE:  Two views of the left femur were obtained, with AP and lateral projections submitted.    COMPARISON:  No relevant prior examinations are currently available for comparison purposes.  Clinical information of trauma.    FINDINGS:  Visualized osseous structures appear intact, with no definite evidence of recent fracture or other significant abnormality identified.                               X-Ray Tibia Fibula 2 View Left (Final result)  Result time 07/03/19 12:56:05    Final result by Bladimir Mcintyre Jr., MD (07/03/19 12:56:05)                 Impression:      There appears to be a healing proximal fibular fracture.      Electronically signed by: Bladimir Mcintyre MD  Date:    07/03/2019  Time:    12:56             Narrative:    EXAMINATION:  XR TIBIA FIBULA 2 VIEW LEFT    CLINICAL HISTORY:  Unspecified fall, initial encounter    TECHNIQUE:  AP and lateral views of the left tibia and fibula were performed.    COMPARISON:  June 2017.    FINDINGS:  There is a healing fracture of the proximal left fibula new compared to prior.  Bones are demineralized.  More distal tibia and  fibula are intact.  Calcaneal spurs noted.                               X-Ray Knee 1 or 2 View Left (Final result)  Result time 07/03/19 12:59:56   Procedure changed from X-Ray Knee 3 View Left     Final result by Rohith Carrillo MD (07/03/19 12:59:56)                 Impression:      No evidence of recent fracture or other significant detrimental interval change since the examinations referenced above.  Old healed left proximal fibular shaft fracture is noted.      Electronically signed by: Rohith Carrillo MD  Date:    07/03/2019  Time:    12:59             Narrative:    EXAMINATION:  XR KNEE 1 OR 2 VIEW LEFT    CLINICAL HISTORY:  FALL;    TECHNIQUE:  Two views of the left knee were obtained, with AP and lateral projections submitted.    COMPARISON:  Comparison is made to prior examinations dated 03/19/2018 and 06/28/2017.  Clinical information of trauma.    FINDINGS:  Visualized osseous structures demonstrate no definite evidence of recent fracture.  No lytic destructive process.  Note is made of a minor deformity relating to an old healed fracture of the proximal aspect of the shaft of the left fibula, representing a fracture which was acute on the exam of 06/28/2017.  No significant joint effusion.                               CT Entire Spine Without Contrast (Final result)  Result time 07/03/19 13:30:14    Final result by Rohith Lei MD (07/03/19 13:30:14)                 Impression:      No evidence of a fracture or dislocation involving the cervical, thoracic, and lumbar spine.    Patchy ground-glass pulmonary opacification throughout both lungs, consistent with this patient's history of pulmonary sarcoidosis.    Prominent bilateral hilar adenopathy.    Retained secretions in the trachea and right mainstem bronchus.    Electronically signed by resident: Bladimir Rosario  Date:    07/03/2019  Time:    11:27    Electronically signed by: Rohith Lei MD  Date:    07/03/2019  Time:    13:30             Narrative:     EXAMINATION:  CT ENTIRE SPINE WITHOUT CONTRAST (XPD)    CLINICAL HISTORY:  fall    TECHNIQUE:  Low-dose axial, sagittal and coronal reformations are obtained through the cervical, thoracic, and lumbar.  Contrast was not administered.    COMPARISON:  CT chest abdomen pelvis 06/01/2019.    FINDINGS:  Cervical spine:    No evidence of a fracture or dislocation.  Cervical spine demonstrates appropriate alignment.  Vertebral body heights are relatively well maintained.  There is mild multilevel degenerative changes of the cervical spine without significant spinal canal neural foraminal narrowing.  Skull base is intact.  Dens is intact.  Soft tissues are unremarkable.    Thoracic spine:    No evidence of a fracture or dislocation.  Vertebral body heights are well maintained.  No significant listhesis.  Soft tissues are unremarkable.    Lumbar spine:    No evidence of a fracture or dislocation.  Alignment is appropriate vertebral body helps are well maintained.  No significant disc space loss.  There are multilevel mild degenerative changes without significant spinal canal stenosis or neural foraminal narrowing.  Prominent facet arthropathy at L3-L4.  Soft tissues are unremarkable.  There is dense aortic and branch vessel calcification.    There are cardiac pacing lead seen.  There is diffuse patchy ground-glass opacity throughout both lungs, consistent with this patient's history of pulmonary sarcoidosis.  There is prominent bilateral hilar adenopathy.  There are retained secretions in the trachea and right mainstem bronchus.                               CT Head Without Contrast (Final result)  Result time 07/03/19 11:22:31    Final result by Sidney Zuniga DO (07/03/19 11:22:31)                 Impression:      Interval development of a thin hyperdense extra-axial collection overlying the right tentorium cerebella most compatible with acute subdural hemorrhage.  No significant mass effect or midline  shift.    Induration swelling with lobular soft tissue opacification concerning for subcutaneous hematoma overlying the right posterior temporal and parietal calvarium without gross underlying fracture.      Electronically signed by: Sidney Zuniga DO  Date:    07/03/2019  Time:    11:22             Narrative:    EXAMINATION:  CT HEAD WITHOUT CONTRAST    CLINICAL HISTORY:  Headache, post trauma;    TECHNIQUE:  Multiple sequential 5 mm axial images of the head without contrast.  Coronal and sagittal reformatted imaging from the axial acquisition.    COMPARISON:  05/31/2019    FINDINGS:  Soft tissue swelling induration and probable subcutaneous hematoma overlying the right posterior temporoparietal calvarium without gross underlying calvarial fracture.    There is a thin extra-axial hyperdense collection overlying the right tentorium cerebella this measures approximately 4 mm in thickness without significant mass effect on the adjacent underlying brain parenchyma.    Ventricles are normal without hydrocephalus.  Study is limited by patient motion.  Clinical correlation and follow-up advised.  Please note there is probable cerumen opacification of the right EAC similar to prior.  There is patchy left ethmoid air cell opacities as well.  Continued empty sella    Case discussed with KIET Dillon 07/03/2019 at 11:19 a.m.                                 Medical Decision Making:   Initial Assessment:   Ms Medel is a 57 yo female patient with PMHx of pulmonary sarcoidosis on 3-5L home oxygen, Class 3 HFrEF (3/2019 EF 35%), perm Afib/flutter s/p 6/2017 AVN ablation & PPM, ESRD (TTS), Type 2 DM (3/2019 A1c 6.2%), pulmonary HTN, YOANDY, and orthostatic hypotension that presents to the ED via EMS after a fall from her wheelchair at home. EMS reports that a family member was pushing her down a wheelchair ramp when they lost control and the patient fell off the side of the ramp out of her wheelchair into the grass. Pt reports brief  LOC. Complaining of headache, blurry vision, dizziness, mid and low back pain, left hip and proximal LLE pain.   Clinical Tests:   Lab Tests: Ordered and Reviewed  Radiological Study: Ordered and Reviewed  Medical Tests: Ordered and Reviewed  ED Management:  Pt hemodynamically stable. Pt non toxic, pleasant, conversational and in no acute distress. CT, X-rays, labs, EKG ordered. C-Collar applied. CT read reports interval development of a thin hyperdense extra-axial collection overlying the right tentorium cerebella most compatible with acute subdural hemorrhage. No significant mass effect or midline shift. INR 3.4. PCC given.  NSG and NCC consulted and will evaluate patient in ED. CT spine read reports no fractures or dislocations of cervical, thoracic or lumbar spine. Pt cleared from c-collar. Xrays reveal a healing proximal left fibular shaft fracture. Xray hip and pelvis negative. NSG evaluated patient and recommend repeat CT and admission to NCC.  NCC evaluated patient and will admit.                       Clinical Impression:       ICD-10-CM ICD-9-CM   1. SDH (subdural hematoma) S06.5X9A 432.1   2. Fall W19.XXXA E888.9   3. Pain of left hip joint M25.552 719.45         Disposition:   Disposition: Admitted  Condition: Critical                        Graham Dillon PA-C  07/03/19 [id number]

## 2019-07-03 NOTE — HPI
The patient is a 58 year old female with PMHx of pulmonary sarcoidosis on 3-5L home oxygen(on prednisone), Class 3 HFrEF (3/2019 EF 35%), perm Afib/flutter s/p 6/2017 AVN ablation & PPM, ESRD (HD TTS-last dialysis yesterday), Type 2 DM (5/30/19 A1c 6.5%), pulmonary HTN, YOANDY, orthostatic hypotension, seizures (on keppra 500 mg Bid),  wheel chair bound admitted to [facility] s/p fall from wheel chair with right SDH. Patient's sister at bedside stated that patient's  fiance was was pushing her down a wheelchair ramp when they lost control and the patient fell off the side of the ramp out of her wheelchair into the grass. Pt reports brief LOC and complaining of  headache. Patient was at home on warfarin (for Afib/flutter) and Plavix. CT head reveals development of a thin hyperdense extra-axial collection overlying the right tentorium cerebella most compatible with acute subdural hemorrhage.  No significant mass effect or midline shift. Pending repeat CTH in 6 hours.  Kcentra, DDAVP and vitamin K initiated. NSGY following. Patient admitted to [facility] for close monitoring and higher level of care.     Of note patient has had frequent hospitalizations in the recent past. These admissions are typically due to volume overload, CHF exacerbation, and shortness of breath. She follows with pulmonology as an outpatient and is on chronic steroids. Per last outpatient note from Dr. Altamirano on 5/23/19, continued on prednisone 20mg daily for sarcoid as she has had difficulty and increased hospitalizations on lower doses.

## 2019-07-03 NOTE — ASSESSMENT & PLAN NOTE
--ESRD on HD, fistula left upper arm, last dialysis Tuesday (7/2)  --nephrology consulted- appreciate isha
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
## 2019-07-03 NOTE — SUBJECTIVE & OBJECTIVE
(Not in a hospital admission)    Review of patient's allergies indicates:   Allergen Reactions    Bactrim [sulfamethoxazole-trimethoprim] Other (See Comments)     Causes renal failure    Nsaids (non-steroidal anti-inflammatory drug) Other (See Comments)     Renal failure       Past Medical History:   Diagnosis Date    Anemia in ESRD (end-stage renal disease) 3/7/2016    Anticoagulant long-term use     Cervical radiculopathy 7/20/2015    CHF (congestive heart failure)     Chronic combined systolic and diastolic heart failure 6/14/2013    EF 20% 2013, improved with Medical therapy, negative PET 2013    Chronic respiratory failure with hypoxia 04/22/2013    On home oxygen at 2-5 liters    Closed fracture of proximal end of right fibula 6/27/2016    Complications due to renal dialysis device, implant, and graft     DDD (degenerative disc disease), lumbar 6/17/2015    Dependence on renal dialysis     Diabetes mellitus type II, controlled 12/8/2017    ESRD on hemodialysis 2/23/2016    Essential hypertension     Gout     Lateral meniscal tear 5/31/2016    Lumbar stenosis 6/17/2015    Obesity, Class III, BMI 40-49.9 (morbid obesity)     Pacemaker     Paroxysmal atrial fibrillation 5/16/2014    Not on anticoagulation    Peripheral neuropathy 11/13/2013    Personal history of gastric ulcer 3/19/2013    Pneumonia of left lower lobe due to infectious organism 3/10/2019    Sarcoidosis, lung 2009    Diagnosed in 2009 with ocular manifestation, on 4L home O2 and PO steroids    Secondary pulmonary hypertension 4/7/2015    Seizure disorder 3/19/2013    Shingles 11/13/2013    Thyroid disease      Past Surgical History:   Procedure Laterality Date    ABDOMINAL SURGERY      ABLATION N/A 6/12/2017    Performed by [facility] at [facility] CATH LAB    ANGIOPLASTY Left 1/10/2018    Performed by Torrey Villafana MD at [facility] OR 2ND FLR    ANGIOPLASTY-PERCUTANEOUS TRANSLUMINAL (PTA) Left 2/7/2017     Performed by [facility] MD at [facility] OR 2ND FLR    ANGIOPLASTY-PERCUTANEOUS TRANSLUMINAL (PTA) Left 10/12/2016    Performed by Torrey Villafana MD at [facility] OR 2ND FLR    CARDIAC PACEMAKER PLACEMENT       SECTION      x2    DECLOT GRAFT PERCUTANEOUS Left 2017    Performed by Torrey Villafana MD at [facility] OR 2ND FLR    DECLOT-GRAFT Left 2016    Performed by Harjit Starr MD at [facility] CATH LAB    DECLOT-GRAFT Left 2016    Performed by Harjit Starr MD at [facility] CATH LAB    ECHOCARDIOGRAM-TRANSESOPHAGEAL N/A 2013    Performed by Delmy Surgeon at [facility] DELMY    Fistulogram Left 2019    Performed by Torrey Villafana MD at [facility] CATH LAB    fistulogram Left 1/10/2018    Performed by Torrey Villafana MD at [facility] OR 2ND FLR    FISTULOGRAM Left 2017    Performed by Torrey Vill[facility]ana MD at [facility] CATH LAB    FISTULOGRAM Left 10/12/2016    Performed by Torrey Villafana MD at [facility] OR 2ND FLR    FISTULOGRAM Left 2016    Performed by Harjit Starr MD at [facility] CATH LAB    Fistulogram, LUE AVG, possible intervention Left 2019    Performed by Torrey Villafana MD at [facility] CATH LAB    Fistulogram, OR 11 Left 2019    Performed by Torrey Villafana MD at [facility] OR 2ND FLR    INJECTION-STEROID-EPIDURAL-TRANSFORAMINAL Right 2015    [facility] at [facility] PAIN MGT    INJECTION-STEROID-EPIDURAL-TRANSFORAMINAL Right 2015    Performed by [facility] MD at [facility] PAIN MGT    MACICOJYB-ITXZY-[id number] / Left upper AV graft. Left 2/3/2016    Performed by Torrey Villafana MD at [facility] OR 2ND FLR    [id number]-JVBHAYUNA-[id number] N/A 2017    Performed by Bladimir Adorno MD at [facility] CATH LAB    OTHER SURGICAL HISTORY      loop recorder implant    PLACEMENT-STENT Left 2017    Performed by Torrey Villafana MD at [facility] OR 2ND FLR    PTA, AV FISTULA N/A 2019    Performed by [facility] MD at [facility]  CATH LAB    stent to fistula      TRANSESOPHAGEAL ECHOCARDIOGRAM (JARAD) N/A 2016    Performed by [facility]rav Machuca MD at [facility] CATH LAB    ULTRASOUND, UPPER GI TRACT, ENDOSCOPIC N/A 2014    Performed by Stewart Burgess MD at [facility] ENDO (2ND FLR)    VASCULAR SURGERY      fistula to L upper arm      Family History     Problem Relation (Age of Onset)    Coronary artery disease Father    Diabetes Mother, Father, Maternal Grandmother    Hypertension Mother, Father    Kidney failure Mother    Lupus Sister        Tobacco Use    Smoking status: Former Smoker     Packs/day: 0.50     Years: 10.00     Pack years: 5.00     Types: Cigarettes     Last attempt to quit: 1994     Years since quittin.2    Smokeless tobacco: Never Used   Substance and Sexual Activity    Alcohol use: No     Alcohol/week: 0.0 oz     Comment: rarely    Drug use: No    Sexual activity: Not Currently     Review of Systems   Constitutional: Negative.    HENT: Negative.    Eyes: Negative.    Respiratory: Positive for shortness of breath. Negative for chest tightness.         On Home O2   Cardiovascular: Negative for chest pain and leg swelling.   Gastrointestinal: Negative for abdominal pain.   Genitourinary: Negative.    Musculoskeletal: Positive for back pain, myalgias and neck pain.   Skin: Positive for wound.        R foot wrapped in podiatry dressing    L foot w/peeling skin, no open wounds   Neurological: Negative for seizures.   Psychiatric/Behavioral: Negative.      Objective:     Weight: 110 kg (242 lb 8.1 oz)  Body mass index is 36.87 kg/m².  Vital Signs (Most Recent):  Temp: 97.5 °F (36.4 °C) (19 0939)  Pulse: 68 (19 1234)  Resp: 16 (19 1234)  BP: 115/66 (19 1233)  SpO2: 100 % (19 1234) Vital Signs (24h Range):  Temp:  [97.5 °F (36.4 °C)] 97.5 °F (36.4 °C)  Pulse:  [68-71] 68  Resp:  [16-19] 16  SpO2:  [89 %-100 %] 100 %  BP: ()/(52-87) 115/66     Date 19 0700 - 19 0659    Shift 5530-0470 8056-8606 0905-5606 24 Hour Total   INTAKE   IV Piggyback 500   500   Shift Total(mL/kg) 500(4.5)   500(4.5)   OUTPUT   Shift Total(mL/kg)       Weight (kg) 110 110 110 110                        Hemodialysis AV Graft Left upper arm (Active)       Physical Exam:  Nursing note and vitals reviewed.    Constitutional: She appears well-developed and well-nourished. No distress.   obese     Eyes: Pupils are equal, round, and reactive to light. EOM are normal.     Musculoskeletal:        Neck: There is no tenderness.        Back: There is no tenderness.        Right Upper Extremities: Muscle strength is 5/5.        Left Upper Extremities: Muscle strength is 5/5.       Right Lower Extremities: There is no tenderness. Muscle strength is 5/5.        Left Lower Extremities: There is tenderness. Muscle strength is 5/5.   No midline tenderness to palpation along entirety of spine  Bruising to R buttock  Tenderness to palpation bilaterally laterally in soft tissue  Pain limited weakness in RUE given R shoulder pain     Neurological:        Sensory: There is no sensory deficit in the trunk. There is sensory deficit in the extremities.   Venous stasis changes to bilateral lower extremities; decreased sensation to light touch L>R (chronic)       DTRs: DTRs are DTRS NORMAL AND SYMMETRICnormal and symmetric.        Cranial nerves: Cranial nerve(s) II, III, IV, V, VI, VII, VIII, IX, X, XI and XII are intact.       Significant Labs:  Recent Labs   Lab 07/01/19  2306 07/03/19  1127   * 113*   * 135*   K 4.9 3.9   CL 84* 88*   CO2 25 30*   BUN 93* 39*   CREATININE 7.6* 5.3*   CALCIUM 7.4* 8.0*     Recent Labs   Lab 07/01/19  2306 07/03/19  1127   WBC 11.50 11.04   HGB 7.8* 7.9*   HCT 25.3* 26.9*    313     Recent Labs   Lab 07/03/19  1127   INR 3.4*       Significant Diagnostics:  I have reviewed and interpreted all pertinent imaging results/findings within the past 24 hours.   X-ray Hips Bilateral 2  View Incl Ap Pelvis    Result Date: 7/3/2019  No acute displaced fracture-dislocation identified. Electronically signed by: Jeancarlos Kohler MD Date:    07/03/2019 Time:    12:45    X-ray Femur Ap/lat Left    Result Date: 7/3/2019  Visualized osseous structures appear intact, with no definite evidence of recent fracture or other significant abnormality identified.  As above Electronically signed by: Rohith Carrillo MD Date:    07/03/2019 Time:    12:55    X-ray Tibia Fibula 2 View Left    Result Date: 7/3/2019  There appears to be a healing proximal fibular fracture. Electronically signed by: Bladimir Mcintyre MD Date:    07/03/2019 Time:    12:56    Ct Head Without Contrast    Result Date: 7/3/2019  Interval development of a thin hyperdense extra-axial collection overlying the right tentorium cerebella most compatible with acute subdural hemorrhage.  No significant mass effect or midline shift. Induration swelling with lobular soft tissue opacification concerning for subcutaneous hematoma overlying the right posterior temporal and parietal calvarium without gross underlying fracture. Electronically signed by: Sidney Zuniga DO Date:    07/03/2019 Time:    11:22    CT Entire Spine reviewed and confirmed with radiology - no fracture identified

## 2019-07-03 NOTE — ASSESSMENT & PLAN NOTE
--Hx of seizures for which she is on keppra at home  --continue Keppra 500 mg BID  --Seizure precautions

## 2019-07-03 NOTE — PLAN OF CARE
Problem: Adult Inpatient Plan of Care  Goal: Plan of Care Review  POC reviewed with pt and family at 1800. Pt verbalized understanding. Questions and concerns addressed. No acute events today. Pt progressing toward goals. Will continue to monitor. See flowsheets for full assessment and VS info.

## 2019-07-03 NOTE — SUBJECTIVE & OBJECTIVE
Past Medical History:   Diagnosis Date    Anemia in ESRD (end-stage renal disease) 3/7/2016    Anticoagulant long-term use     Cervical radiculopathy 2015    CHF (congestive heart failure)     Chronic combined systolic and diastolic heart failure 2013    EF 20% , improved with Medical therapy, negative PET     Chronic respiratory failure with hypoxia 2013    On home oxygen at 2-5 liters    Closed fracture of proximal end of right fibula 2016    Complications due to renal dialysis device, implant, and graft     DDD (degenerative disc disease), lumbar 2015    Dependence on renal dialysis     Diabetes mellitus type II, controlled 2017    ESRD on hemodialysis 2016    Essential hypertension     Gout     Lateral meniscal tear 2016    Lumbar stenosis 2015    Obesity, Class III, BMI 40-49.9 (morbid obesity)     Pacemaker     Paroxysmal atrial fibrillation 2014    Not on anticoagulation    Peripheral neuropathy 2013    Personal history of gastric ulcer 3/19/2013    Pneumonia of left lower lobe due to infectious organism 3/10/2019    Sarcoidosis, lung 2009    Diagnosed in  with ocular manifestation, on 4L home O2 and PO steroids    Secondary pulmonary hypertension 2015    Seizure disorder 3/19/2013    Shingles 2013    Thyroid disease      Past Surgical History:   Procedure Laterality Date    ABDOMINAL SURGERY      ABLATION N/A 2017    [facility]    ANGIOPLASTY Left 1/10/2018    [facility] OR 2ND FLR    ANGIOPLASTY-PERCUTANEOUS TRANSLUMINAL (PTA) Left 2017    [facility] OR 2ND FLR    ANGIOPLASTY-PERCUTANEOUS TRANSLUMINAL (PTA) Left 10/12/2016    [facility] OR 2ND FLR    CARDIAC PACEMAKER PLACEMENT       SECTION      x2    DECLOT GRAFT PERCUTANEOUS Left  2/7/2017    [facility] at [facility] OR 2ND FLR    DECLOT-GRAFT Left 6/21/2016    Performed by Harjit Starr MD at [facility] CATH LAB    DECLOT-GRAFT Left 6/20/2016    Performed by [facility] at [facility] CATH LAB    ECHOCARDIOGRAM-TRANSESOPHAGEAL N/A 6/27/2013    Performed by Delmy Surgeon at [facility] DELMY    Fistulogram Left 4/8/2019    Performed by [facility] at [facility] CATH LAB    fistulogram Left 1/10/2018    Performed by [facility] at [facility] OR 2ND FLR    FISTULOGRAM Left 9/13/2017    Performed by [facility] at [facility] CATH LAB    FISTULOGRAM Left 10/12/2016    Performed by [facility] at [facility] OR 2ND FLR    FISTULOGRAM Left 6/21/2016    Performed by Harjit Starr MD at [facility] CATH LAB    Fistulogram, LUE AVG, possible intervention Left 1/30/2019    Performed by [facility] at [facility] CATH LAB    Fistulogram, OR 11 Left 5/8/2019    Performed by Tor[facility] at [facility] OR 2ND FLR    INJECTION-STEROID-EPIDURAL-TRANSFORAMINAL Right 7/20/2015    Performed by [facility]rez [facility] at [facility] PAIN MGT    INJECTION-STEROID-EPIDURAL-TRANSFORAMINAL Right 5/21/2015    Performed by Pat[facility]rez MD at [facility] PAIN MGT    SOFTZUJQS-RMPMC-FODNDGBCTMEIQ / Left upper AV graft. Left 2/3/2016    Performed by [facility]ana [facility] at [facility] OR 2ND FLR    HFQQMPIPV-XUTOTARLC-DRTNFFPRAPNXS N/A 1/11/2017    Performed by Bladimir Adorno MD at [facility] CATH LAB    OTHER SURGICAL HISTORY      loop recorder implant    PLACEMENT-STENT Left 2/7/2017    Performed by [facility] at [facility] OR 2ND FLR    PTA, AV FISTULA N/A 1/30/2019    Performed by [facility] at [facility] CATH LAB    stent to fistula      TRANSESOPHAGEAL ECHOCARDIOGRAM (JARAD) N/A 6/27/2016    Performed by Sourav Machuca MD at [facility] CATH LAB    ULTRASOUND, UPPER GI TRACT, ENDOSCOPIC N/A 1/9/2014    Performed by Stewart Burgess MD at [facility] ENDO (2ND FLR)    VASCULAR SURGERY       fistula to L upper arm       No current facility-administered medications on file prior to encounter.      Current Outpatient Medications on File Prior to Encounter   Medication Sig Dispense Refill    aspirin (ECOTRIN) 81 MG EC tablet Take 81 mg by mouth once daily.      atorvastatin (LIPITOR) 80 MG tablet TAKE 1 TABLET BY MOUTH EVERY EVENING 90 tablet 1    blood sugar diagnostic Strp 1 each by Misc.(Non-Drug; Combo Route) route once daily. (Patient taking differently: 1 each by Misc.(Non-Drug; Combo Route) route 2 (two) times daily. ) 100 each 3    cinacalcet (SENSIPAR) 30 MG Tab Take 30 mg by mouth daily with breakfast.      clopidogrel (PLAVIX) 75 mg tablet TAKE 1 TABLET(75 MG) BY MOUTH EVERY DAY 90 tablet 0    fludrocortisone (FLORINEF) 0.1 mg Tab Take 1 tablet (100 mcg total) by mouth 2 (two) times daily. 60 tablet 5    lancets Misc 1 each by Misc.(Non-Drug; Combo Route) route once daily. (Patient taking differently: 1 each by Misc.(Non-Drug; Combo Route) route 2 (two) times daily. ) 100 each 3    levETIRAcetam (KEPPRA) 500 MG Tab TAKE 1 TABLET BY MOUTH TWICE DAILY 180 tablet 3    LORazepam (ATIVAN) 1 MG tablet Take 0.5 tablets (0.5 mg total) by mouth every 8 (eight) hours as needed for Anxiety. 14 tablet 0    miconazole NITRATE 2 % (MICOTIN) 2 % top powder Apply topically 2 (two) times daily.  0    midodrine (PROAMATINE) 10 MG tablet Take 1 tablet (10 mg total) by mouth 3 (three) times daily. 90 tablet 11    nortriptyline (PAMELOR) 25 MG capsule Take 1 capsule by mouth nightly  1    omeprazole (PRILOSEC) 20 MG capsule TAKE 1 CAPSULE BY MOUTH EVERY DAY 90 capsule 4    patiromer calcium sorbitex (VELTASSA) 16.8 gram PwPk Hold until follow-up with PCP 1 packet 0    prednisoLONE acetate (PRED FORTE) 1 % DrpS INSTILL ONE DROP INTO  LEFT EYE THREE TIMES A DAY  3    predniSONE (DELTASONE) 20 MG tablet Take 1 tablet (20 mg total) by mouth once daily. 30 tablet 3    PROLENSA 0.07 % Drop INSTILL 1  DROP IN LEFT / RIGHT EYE QD  12    DENISE-LINDA 0.8 mg Tab TAKE 1 TABLET BY MOUTH ONCE DAILY  0    sevelamer carbonate (RENVELA) 800 mg Tab Take 1 tablet (800 mg total) by mouth daily with dinner or evening meal. 30 tablet 3    TRADJENTA 5 mg Tab tablet TAKE 1 TABLET(5 MG) BY MOUTH EVERY DAY 90 tablet 1    warfarin (COUMADIN) 5 MG tablet Take 1 tablet (5mg)  by mouth every Tues, Th, Sat and 1/2 tablet (2.5mg) every Mon, Wed, Fri. And Sun. 30 tablet 3      Allergies: Bactrim [sulfamethoxazole-trimethoprim] and Nsaids (non-steroidal anti-inflammatory drug)    Family History   Problem Relation Age of Onset    Kidney failure Mother     Hypertension Mother     Diabetes Mother     Coronary artery disease Father     Hypertension Father     Diabetes Father     Lupus Sister     Diabetes Maternal Grandmother     Colon cancer Neg Hx     Ovarian cancer Neg Hx     Breast cancer Neg Hx      Social History     Tobacco Use    Smoking status: Former Smoker     Packs/day: 0.50     Years: 10.00     Pack years: 5.00     Types: Cigarettes     Last attempt to quit: 1994     Years since quittin.2    Smokeless tobacco: Never Used   Substance Use Topics    Alcohol use: No     Alcohol/week: 0.0 oz     Comment: rarely    Drug use: No     Review of Systems   Constitutional: Denies fevers, weight loss, chills, or weakness. Complains of faitgue  Eyes: Denies changes in vision.  ENT: Denies dysphagia, nasal discharge, ear pain or discharge.  Cardiovascular: Denies chest pain, palpitations, orthopnea, or claudication.  Respiratory: Denies shortness of breath, cough, hemoptysis, or wheezing.  GI: Denies nausea/vomitting, hematochezia, melena, abd pain, or changes in appetite.  : Denies dysuria, incontinence, or hematuria.  Musculoskeletal: Denies joint pain or myalgias. Complains of generalized weakness  Skin/breast: Denies rashes, lumps, lesions, or discharge.  Neurologic: Denies  dizziness, vertigo, or paresthesias.  Complains of headache, back pain  Psychiatric: Denies changes in mood or hallucinations.  Endocrine: Denies polyuria, polydipsia, heat/cold intolerance.  Hematologic/Lymph: Denies lymphadenopathy, easy bruising or easy bleeding.  Allergic/Immunologic: Denies rash, rhinitis.   Objective:     Vitals:    Temp: 97.5 °F (36.4 °C)  Pulse: 69  Rhythm: normal sinus rhythm  BP: 103/62  MAP (mmHg): 77  Resp: 16  SpO2: 100 %  O2 Device (Oxygen Therapy): room air    Temp  Min: 97.5 °F (36.4 °C)  Max: 97.5 °F (36.4 °C)  Pulse  Min: 68  Max: 71  BP  Min: 84/57  Max: 128/52  MAP (mmHg)  Min: 63  Max: 84  Resp  Min: 16  Max: 19  SpO2  Min: 89 %  Max: 100 %    No intake/output data recorded.           Physical Exam  GA:  Comfortable, well developed, obese  HEENT: No scleral icterus or JVD.   Pulmonary: Diminished to auscultation A/L. Patient on O2 (at home and in hosp)   Cardiac: RRR S1 & S2 w/o rubs/murmurs/gallops.   Abdominal: Bowel sounds present x 4. No appreciable hepatosplenomegaly.  Skin: BLE various stages of wounds/ulcers  Neuro:  --GCS: E3 V5 M6  --Mental Status:  Opens eyes to voice, oriented X4, clear speech, follows simple commands, answers qts appropriately  --CN II-XII grossly intact.   --Pupils 5->4mm, PERRL.   --Corneal reflex, gag, cough intact.  --LUE strength: 5/5   --RUE strength: 5/5  --BUE-central tremor noted  --LLE and RLE able to move spont. However not able to hold against gravity (at home on wheelchair)    Today I personally reviewed pertinent medications, lines/drains/airways, imaging, laboratory results,
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
## 2019-07-03 NOTE — TELEPHONE ENCOUNTER
----- Message from Elizabeth Brown sent at 7/3/2019  9:10 AM CDT -----  Contact: patient   Please call pt at [phone]    Patient would like to come this afternoon instead of the morning hours because she had to go to the emergency room today    Thank you

## 2019-07-03 NOTE — PROGRESS NOTES
Subjective:      Patient ID: Sisi Medel is a 58 y.o. female.    Chief Complaint: Diabetes Mellitus (03/01/2019 dr jill eduardo) and Diabetic Foot Exam    Pt here today for multiple wounds on lower extremities. Denies any NVFCSOB chest or calf pain. Pt has a nurse changing dressings 3 times a week.     5/13/2019 Pt presents today for wound care. Pt states she has been in the hospital recently, for breathing issues. Pt denies any NVFCSOB.     5/26/2019 Pt is in SNF facility. Pt states that she had a lot of dead skin on her feet, and she started peeling it off, then it began to bleed. Pt states nurse at the facility also peeled some of the skin off.     Past Medical History:   Diagnosis Date    Anemia in ESRD (end-stage renal disease) 3/7/2016    Anticoagulant long-term use     Cervical radiculopathy 7/20/2015    CHF (congestive heart failure)     Chronic combined systolic and diastolic heart failure 6/14/2013    EF 20% 2013, improved with Medical therapy, negative PET 2013    Chronic respiratory failure with hypoxia 04/22/2013    On home oxygen at 2-5 liters    Closed fracture of proximal end of right fibula 6/27/2016    Complications due to renal dialysis device, implant, and graft     DDD (degenerative disc disease), lumbar 6/17/2015    Dependence on renal dialysis     Diabetes mellitus type II, controlled 12/8/2017    ESRD on hemodialysis 2/23/2016    Essential hypertension     Gout     Lateral meniscal tear 5/31/2016    Lumbar stenosis 6/17/2015    Obesity, Class III, BMI 40-49.9 (morbid obesity)     Pacemaker     Paroxysmal atrial fibrillation 5/16/2014    Not on anticoagulation    Peripheral neuropathy 11/13/2013    Personal history of gastric ulcer 3/19/2013    Pneumonia of left lower lobe due to infectious organism 3/10/2019    Sarcoidosis, lung 2009    Diagnosed in 2009 with ocular manifestation, on 4L home O2 and PO steroids    Secondary pulmonary hypertension 4/7/2015     Seizure disorder 3/19/2013    Shingles 11/13/2013    Thyroid disease            No current facility-administered medications on file prior to visit.      Current Outpatient Medications on File Prior to Visit   Medication Sig Dispense Refill    aspirin (ECOTRIN) 81 MG EC tablet Take 81 mg by mouth once daily.      atorvastatin (LIPITOR) 80 MG tablet TAKE 1 TABLET BY MOUTH EVERY EVENING 90 tablet 1    blood sugar diagnostic Strp 1 each by Misc.(Non-Drug; Combo Route) route once daily. (Patient taking differently: 1 each by Misc.(Non-Drug; Combo Route) route 2 (two) times daily. ) 100 each 3    cinacalcet (SENSIPAR) 30 MG Tab Take 30 mg by mouth daily with breakfast.      clopidogrel (PLAVIX) 75 mg tablet TAKE 1 TABLET(75 MG) BY MOUTH EVERY DAY 90 tablet 0    fludrocortisone (FLORINEF) 0.1 mg Tab Take 1 tablet (100 mcg total) by mouth 2 (two) times daily. 60 tablet 5    lancets Misc 1 each by Misc.(Non-Drug; Combo Route) route once daily. (Patient taking differently: 1 each by Misc.(Non-Drug; Combo Route) route 2 (two) times daily. ) 100 each 3    levETIRAcetam (KEPPRA) 500 MG Tab TAKE 1 TABLET BY MOUTH TWICE DAILY 180 tablet 3    midodrine (PROAMATINE) 10 MG tablet Take 1 tablet (10 mg total) by mouth 3 (three) times daily. 90 tablet 11    nortriptyline (PAMELOR) 25 MG capsule Take 1 capsule by mouth nightly  1    omeprazole (PRILOSEC) 20 MG capsule TAKE 1 CAPSULE BY MOUTH EVERY DAY 90 capsule 4    prednisoLONE acetate (PRED FORTE) 1 % DrpS INSTILL ONE DROP INTO  LEFT EYE THREE TIMES A DAY  3    PROLENSA 0.07 % Drop INSTILL 1 DROP IN LEFT / RIGHT EYE QD  12    DENISE-LINDA 0.8 mg Tab TAKE 1 TABLET BY MOUTH ONCE DAILY  0    TRADJENTA 5 mg Tab tablet TAKE 1 TABLET(5 MG) BY MOUTH EVERY DAY 90 tablet 1    miconazole NITRATE 2 % (MICOTIN) 2 % top powder Apply topically 2 (two) times daily.  0    patiromer calcium sorbitex (VELTASSA) 16.8 gram PwPk Hold until follow-up with PCP 1 packet 0    predniSONE  (DELTASONE) 20 MG tablet Take 1 tablet (20 mg total) by mouth once daily. 30 tablet 3    sevelamer carbonate (RENVELA) 800 mg Tab Take 1 tablet (800 mg total) by mouth daily with dinner or evening meal. 30 tablet 3    warfarin (COUMADIN) 5 MG tablet Take 1 tablet (5mg)  by mouth every Tues, Thurs, Sat and 1/2 tablet (2.5mg) every Mon, Wed, Fri. And Sun. 30 tablet 3           Review of patient's allergies indicates:   Allergen Reactions    Bactrim [sulfamethoxazole-trimethoprim] Other (See Comments)     Causes renal failure    Nsaids (non-steroidal anti-inflammatory drug) Other (See Comments)     Renal failure           Social History     Socioeconomic History    Marital status: Single     Spouse name: Not on file    Number of children: Not on file    Years of education: Not on file    Highest education level: Not on file   Occupational History    Not on file   Social Needs    Financial resource strain: Not on file    Food insecurity:     Worry: Not on file     Inability: Not on file    Transportation needs:     Medical: Not on file     Non-medical: Not on file   Tobacco Use    Smoking status: Former Smoker     Packs/day: 0.50     Years: 10.00     Pack years: 5.00     Types: Cigarettes     Last attempt to quit: 1994     Years since quittin.2    Smokeless tobacco: Never Used   Substance and Sexual Activity    Alcohol use: No     Alcohol/week: 0.0 oz     Comment: rarely    Drug use: No    Sexual activity: Not Currently   Lifestyle    Physical activity:     Days per week: Not on file     Minutes per session: Not on file    Stress: Not on file   Relationships    Social connections:     Talks on phone: Not on file     Gets together: Not on file     Attends [employer] service: Not on file     Active member of club or organization: Not on file     Attends meetings of clubs or organizations: Not on file     Relationship status: Not on file   Other Topics Concern    Are you pregnant or think you  "may be? No    Breast-feeding No   Social History Narrative    Lives with boyfriend/partner who helps with her care.            Review of Systems   Constitution: Negative for chills, diaphoresis, fever, malaise/fatigue and night sweats.   Cardiovascular: Positive for leg swelling. Negative for claudication and cyanosis.   Skin: Positive for color change, dry skin, nail changes, poor wound healing and unusual hair distribution.   Musculoskeletal:        Abnormal gait, uses rolling walker.    Neurological: Positive for numbness and sensory change. Negative for paresthesias, tremors and weakness.           Objective:        Vitals:    06/26/19 1050   BP: 101/72   Pulse: 65   Weight: 108.9 kg (240 lb)   Height: 5' 9.6" (1.768 m)           Physical Exam   Cardiovascular:   Pulses:       Dorsalis pedis pulses are 0 on the right side, and 0 on the left side.        Posterior tibial pulses are 1+ on the right side, and 1+ on the left side.   Diffuse non pitting edema b/L     Musculoskeletal:    Amputations noted to right hallux and right 2nd digit   Feet:   Right Foot:   Protective Sensation: 5 sites tested. 0 sites sensed.   Left Foot:   Protective Sensation: 5 sites tested. 0 sites sensed.   Lymphadenopathy:   Negative lymphadenopathy bilateral popliteal fossa and tarsal tunnel.  Negative lymphangitic streaking bilateral foot/ankle bilateral.     Neurological:   Absent/loss of protective sensation all toes bilateral to 10 gram monofilament.     Skin: Lesion noted.   Nails x8 are short and dystrophic    Ulceration  Location: right lateral leg  Measurements: healed    Ulceration  Location: left medial hallux  Measurements: healed    Ulceration  Location: right plantar hallux  Measurements: healed    Right plantar foot, deep tear in skin, down to sub-q level.                    Assessment:       Encounter Diagnosis   Name Primary?    Skin ulcer of right foot with fat layer exposed Yes         Plan:       Sisi was seen " today for diabetes mellitus and diabetic foot exam.    Diagnoses and all orders for this visit:    Skin ulcer of right foot with fat layer exposed      I counseled the patient on her conditions, their implications and medical management.    Ulceration on right foot  debrided through sub-q tissue using an tissue nipper. Ulceration debrided down to healthy tissue. Pt tolerated debridement well.   Pt is to be NWB to right foot.   Football dressing applied to pts b/L foot,  by Sadie Chauhan MA under my direct supervision. Pt tolerated dressing well.   RTC in 2 weeks or sooner if any new pedal problems arise, any signs of infection occur, or if condition worsens.

## 2019-07-03 NOTE — PLAN OF CARE
Attempted to meet with patient and or family in room for discharge planning assessment. Pt unable to answer questions.  Patient opened eyes and nodded yes when asked if her information was the same.   No family present in room.  Patient with frequent hospital readmits over the past 2 months.  Assessment per chart.   Patient discharged from [facility] this AM with [facility].        Future Appointments   Date Time Provider Department Center   7/5/2019  8:10 AM LAB, APPOINTMENT [facility] INTMED NOM LAB [facility]   7/5/2019  9:00 AM Carlos A Caputo MD [facility] IM [facility]   7/5/2019  2:45 PM [facility] OIC-US1 MASTER [facility] ULTR [facility]   7/8/2019  8:30 AM Claudia Ron DPM [facility] POD [facility]   7/22/2019 11:00 AM VASCULAR, LAB [facility] VASCLAB [facility]   7/22/2019  2:00 PM Torrey Villafana MD [facility] VASCSUR [facility]   9/20/2019 10:00 AM [facility] EMG PROCEDURAL LAB [facility] NEURO [facility]   9/20/2019 12:00 PM EKG, APPT [facility] EKG [facility]   9/20/2019  1:00 PM COORDINATED DEVICE CHECK [facility] ARRHPRO [facility]   9/20/2019  2:00 PM Arabella Matthews NP [facility] ARRHYTH [facility]   10/1/2019  8:00 AM HOME MONITOR DEVICE CHECK, [facility] ARRHPRO [facility]            07/03/19 1554   Discharge Assessment   Assessment Type Discharge Planning Assessment   Confirmed/corrected address and phone number on facesheet? Yes   Assessment information obtained from? Medical Record   Expected Length of Stay (days) 5   Communicated expected length of stay with patient/caregiver no   Prior to hospitilization cognitive status: Alert/Oriented   Prior to hospitalization functional status: Needs Assistance;Assistive Equipment   Current cognitive status: Unable to Assess   Current Functional Status: Needs Assistance;Assistive Equipment   Facility Arrived From: Home    Lives With significant other  (significant other)   Able to Return to Prior Arrangements other (see comments)  (rosalind)   Is patient able to care for self after discharge? Unable to  determine at this time (comments)   Who are your caregiver(s) and their phone number(s)? Maikel aMgallanes (mother) [phone], Kenan Pfeiffer (son) [phone], S/o  Osbaldo Crowley  [phone])   Patient's perception of discharge disposition other (comments)  (rosalind)   Readmission Within the Last 30 Days current reason for admission unrelated to previous admission   If yes, most recent facility name: [facility]    Patient currently being followed by outpatient case management? No   Patient currently receives any other outside agency services? Yes   Name and contact number of agency or person providing outside services [facility]    Equipment Currently Used at Home bedside commode;oxygen;wheelchair;rollator   Do you have any problems affording any of your prescribed medications? TBD   Is the patient taking medications as prescribed?   (rosalind)   Does the patient have transportation home? Yes   Transportation Anticipated family or friend will provide   Dialysis Name and Scheduled days [facility]   Discharge Plan A Home with family;Home Health   Discharge Plan B Rehab   DME Needed Upon Discharge  other (see comments)  (tbd)   Patient/Family in Agreement with Plan unable to assess   Readmission Questionnaire   At the time of your discharge, did someone talk to you about what your health problems were?   (rosalind)   At the time of discharge, did someone talk to you about what to watch out for regarding worsening of your health problem?   (rosalind)   At the time of discharge, did someone talk to you about what to do if you experienced worsening of your health problem?   (rosalind)   At the time of discharge, did someone talk to you about which medication to take when you left the hospital and which ones to stop taking?   (rosalind)   At the time of discharge, did someone talk to you about when and where to follow up with a doctor after you left the hospital?   (rosalind)   What do you believe caused you to be sick enough to be re-admitted? fall    Living Arrangements house   Are you currently feeling confused?   (rosalind)   Are you currently having problems thinking?   (rosalind)   Are you currently having memory problems?   (rosalind)   Have you felt down, depressed, or hopeless? Unable to Assess   Have you felt little interest or pleasure in doing things? Unable to assess         Discharge/ My Health Packet Folder Given to patient/family:      yes      PCP:  Carlos A Caputo MD        Pharmacy:    [facility] [address] [place] [address] [address] [facility] [employer] [address] [facility] [address]  [address]  [employer] [facility] [address]  Phone: [phone] Fax: [phone]        Emergency Contacts:    Extended Emergency Contact Information  Primary Emergency Contact: ElpidioMaikel  Address: [address] [place] [address] [facility]  Home Phone: [phone]  Mobile Phone: [phone]  Relation: Mother  Secondary Emergency Contact: Kenan Brown   [facility]  Home Phone: [phone]  Mobile Phone: [phone]  Relation: Son    Insurance:    Payor: [employer] / Plan: [employer] [facility] / Product Type: Medicare Advantage /     Romana Chaidez RN, CCRN-K, [facility]  Neuro-Critical Care   [phone]

## 2019-07-04 PROBLEM — L97.509 FOOT ULCER: Status: ACTIVE | Noted: 2019-01-01

## 2019-07-04 NOTE — CONSULTS
Wound care consult received from MD team and nursing for wounds to BLE.  Pt has been followed by podiatry for BLE wounds with treatment plan in place.  Nurse verbalized understanding and reported orders have been place for skin tear to thigh.  Wound care team to sign off. [id number]

## 2019-07-04 NOTE — ASSESSMENT & PLAN NOTE
58 F with pulmonary sarcoidosis on 3-5L home oxygen, Class 3 HFrEF (3/2019 EF 35%), perm Afib/flutter s/p 6/2017 AVN ablation & PPM, ESRD (TTS HD via AVF, unknown baseline Cr), Type 2 DM (3/2019 A1c 6.2%), pulmonary HTN, YOANDY, and orthostatic hypotension that presented to ED s/p fall from wheelchair with acute R SDH. Given K-Centra in ED to reverse AC/APT.    - Neurologically stable  - No acute neurosurgical intervention necessary at this time  - Repeat CT head stable, slight increase subdural collection however no significant mass effect or midline shift  - Ok to start SQH for dvt prophylaxis.   - Repeat CT head Monday 7/8, if stable ok to resume anticoagulation.  - Neurochecks q1h  - INR 1.3 today  - HOB >30  - SBP <140; cardene prn  - Na 135-150   - Keppra 500 BID x2 weeks  - Remainder of medical management per primary team   - Will continue to monitor closely, please page with questions/concerns

## 2019-07-04 NOTE — HPI
Sisi Medel is a 58 y.o. female who  has a past medical history of Anemia in ESRD (end-stage renal disease), Anticoagulant long-term use, Cervical radiculopathy, CHF (congestive heart failure), Chronic combined systolic and diastolic heart failure, Chronic respiratory failure with hypoxia, Closed fracture of proximal end of right fibula, Complications due to renal dialysis device, implant, and graft, DDD (degenerative disc disease), lumbar, Dependence on renal dialysis, Diabetes mellitus type II, controlled, ESRD on hemodialysis, Essential hypertension, Gout, Lateral meniscal tear, Lumbar stenosis, Obesity, Class III, BMI 40-49.9 (morbid obesity), Pacemaker, Paroxysmal atrial fibrillation, Peripheral neuropathy, Personal history of gastric ulcer, Pneumonia of left lower lobe due to infectious organism, Sarcoidosis, lung, Secondary pulmonary hypertension, Seizure disorder, Shingles, and Thyroid disease.    Patient Admited for SDH.  Consulted to Podiatry for Lower extremity wounds.  Patient is followed by Dr. Ron for LE wounds.  Since her fall she complains of pain in her legs.  Also complains of dry feet.  Denies F/C/N/V
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
## 2019-07-04 NOTE — PLAN OF CARE
Problem: Physical Therapy Goal  Goal: Physical Therapy Goal  Goals to be met by: 2019     Patient will increase functional independence with mobility by performin. Supine to sit with Modified [place]  2. Sit to supine with supervision  3. Sit to stand transfer with Contact Guard Assistance with RW and maintaining any weight bearing precautions  4. Bed to chair transfer with Contact Guard Assistance using Rolling Walker and maintaining any weight bearing precautions  4B. Bed to w/c transfer using sliding board and maintaining any weight bearing precautions w/ min assist  5. Gait  x 30 feet with Contact Guard Assistance using Rolling Walker. and maintaining any weight bearing precautions  6. Lower extremity exercise program x20 reps per handout, with assistance as needed     Goals established for weight bearing precautions. Per recent SNF notes, patient is presently RLE NWB    Comments: PT eval completed and new goal added. Megan Licona, PT 2019

## 2019-07-04 NOTE — ASSESSMENT & PLAN NOTE
Outpatient HD unit: [facility]   -Nephrologist: Patient does not know  -HD tx days: TThS  -HD tx time: 4hrs 45mins    -HD access: LUE AVG  -HD modality: iHD  -Residual urine: Minimal  -EDW: 109 kg    Plan:  - HD session today  - Strict I/Os and chart  - Neuro checks by primary team  - MAP > 65  - Hb > 7 gm/dL, to target 10 gm/dL for ESRD/CKD patients  - Will follow closely

## 2019-07-04 NOTE — HOSPITAL COURSE
7/4: ARELY. AAO x 4. Repeat CT head stable. Mild headache controlled with medication. Denies visual changes, weakness, worsening paresthesias. Diabetic neuropathy BLE at baseline. Repeat INR today 1.3.   7/5: arely  7/6-7/7: ANTONIO MCGEE, Exam stable, plan for CTH in AM prior to resuming AC  7/8: No acute events reported.  Patient complaining of right arm pain 2/2 ecchymosis around her IV site.  Admits to mild headache and nausea. Denies visual changes, vomiting, acute focal weakness.  Repeat CT head today stable.  OK to resume anticoagulation.

## 2019-07-04 NOTE — SUBJECTIVE & OBJECTIVE
Scheduled Meds:   sodium chloride 0.9%   Intravenous Once    atorvastatin  80 mg Oral QHS    fludrocortisone  200 mcg Oral BID    levETIRAcetam  500 mg Oral BID    midodrine  10 mg Oral TID    predniSONE  20 mg Oral Daily     Continuous Infusions:  PRN Meds:sodium chloride 0.9%, acetaminophen, Dextrose 10% Bolus, glucagon (human recombinant), insulin aspart U-100, ondansetron    Review of patient's allergies indicates:   Allergen Reactions    Bactrim [sulfamethoxazole-trimethoprim] Other (See Comments)     Causes renal failure    Nsaids (non-steroidal anti-inflammatory drug) Other (See Comments)     Renal failure        Past Medical History:   Diagnosis Date    Anemia in ESRD (end-stage renal disease) 3/7/2016    Anticoagulant long-term use     Cervical radiculopathy 7/20/2015    CHF (congestive heart failure)     Chronic combined systolic and diastolic heart failure 6/14/2013    EF 20% 2013, improved with Medical therapy, negative PET 2013    Chronic respiratory failure with hypoxia 04/22/2013    On home oxygen at 2-5 liters    Closed fracture of proximal end of right fibula 6/27/2016    Complications due to renal dialysis device, implant, and graft     DDD (degenerative disc disease), lumbar 6/17/2015    Dependence on renal dialysis     Diabetes mellitus type II, controlled 12/8/2017    ESRD on hemodialysis 2/23/2016    Essential hypertension     Gout     Lateral meniscal tear 5/31/2016    Lumbar stenosis 6/17/2015    Obesity, Class III, BMI 40-49.9 (morbid obesity)     Pacemaker     Paroxysmal atrial fibrillation 5/16/2014    Not on anticoagulation    Peripheral neuropathy 11/13/2013    Personal history of gastric ulcer 3/19/2013    Pneumonia of left lower lobe due to infectious organism 3/10/2019    Sarcoidosis, lung 2009    Diagnosed in 2009 with ocular manifestation, on 4L home O2 and PO steroids    Secondary pulmonary hypertension 4/7/2015    Seizure disorder 3/19/2013     Shingles 2013    Thyroid disease      Past Surgical History:   Procedure Laterality Date    ABDOMINAL SURGERY      ABLATION N/A 2017    [facility] C[facility]    ANGIOPLASTY Left 1/10/2018    [facility] at [facility] OR [facility]ND [facility]LR    ANGIOPLASTY-PERCUTANEOUS TRANSLUMINAL (PTA) Left 2017    [facility]    ANGIOPLASTY-PERCUTANEOUS TRANSLUMINAL (PTA) Left 10/12/2016    [facility]LR    CARDIAC PACEMAKER PLACEMENT       SECTION      x2    DECLOT GRAFT PERCUTANEOUS Left 2017    [facility]    DECLOT-GRAFT Left 2016    [facility] at [facility] CATH LAB    DECLOT-GRAFT Left 2016    [facility] CATH [facility]    ECHOCARDIOGRAM-TRANSESOPHAGEAL N/A 2013    [facility]geon at [facility]    Fistulogram Left 2019    [facility] CATH [facility]    fistulogram Left 1/10/2018    [facility] OR [facility]ND [facility]LR    FISTULOGRAM Left 2017    [facility] CATH [facility]    FISTULOGRAM Left 10/12/2016    [facility]    FISTULOGRAM Left 2016    [facility] CATH LAB    Fistulogram, LUE AVG, possible intervention Left 2019    [facility] CATH LAB    Fistulogram, OR 11 Left 2019    [facility]    INJECTION-STEROID-EPIDURAL-TRANSFORAMINAL Right 2015    [facility]IN [facility]    INJECTION-STEROID-EPIDURAL-TRANSFORAMINAL Right 2015    [facility] PAIN [facility]GT    PYQLPKICS-MBPUP-[id number] / Left upper AV graft. Left 2/3/2016    [facility]    BMUCSFFSU-[id number]-NPJFMAGVSQZWI N/A 2017    [facility] by [facility] CATH LAB    OTHER SURGICAL HISTORY      loop recorder implant    PLACEMENT-STENT Left 2017    [facility]    PTA, AV FISTULA N/A 2019    [facility] CATH LAB    stent to fistula      TRANSESOPHAGEAL ECHOCARDIOGRAM (JARAD) N/A 2016    Performed by [facility]rav Machuca MD at [facility] CATH LAB    ULTRASOUND, UPPER GI TRACT, ENDOSCOPIC N/A 2014    Performed by Stewart Burgess MD at [facility] ENDO (2ND FLR)    VASCULAR SURGERY      fistula to L upper arm        Family History     Problem Relation (Age of Onset)    Coronary artery disease Father    Diabetes Mother, Father, Maternal Grandmother    Hypertension Mother, Father    Kidney failure Mother    Lupus Sister        Tobacco Use    Smoking status: Former Smoker     Packs/day: 0.50     Years: 10.00     Pack years: 5.00     Types: Cigarettes     Last attempt to quit: 1994     Years since quittin.2    Smokeless tobacco: Never Used   Substance and Sexual Activity    Alcohol use: No     Alcohol/week: 0.0 oz     Comment: rarely    Drug use: No    Sexual activity: Not Currently     Review of Systems   Constitutional: Negative for chills and fever.   Cardiovascular: Negative for leg swelling.   Gastrointestinal: Negative for nausea and vomiting.   Musculoskeletal: Positive for myalgias. Negative for arthralgias and joint swelling.   Skin: Positive for wound. Negative for rash.   Psychiatric/Behavioral: Negative for agitation and confusion.     Objective:     Vital Signs (Most Recent):  Temp: 97.9 °F (36.6 °C) (19 1100)  Pulse: 69 (19 1500)  Resp: (!) 28 (19 1500)  BP: (!) 94/59 (19 1500)  SpO2: (!) 93 % (19 1500) Vital Signs (24h Range):  Temp:  [97.2 °F (36.2 °C)-98.8 °F (37.1 °C)] 97.9 °F (36.6 °C)  Pulse:  [69-73] 69  Resp:   [16-52] 28  SpO2:  [65 %-100 %] 93 %  BP: ()/(51-92) 94/59     Weight: 110 kg (242 lb 8.1 oz)  Body mass index is 36.87 kg/m².    Foot Exam    Laboratory:  A1C:   Recent Labs   Lab 05/10/19  0532 05/30/19  1833 07/03/19  1127   HGBA1C 6.0* 6.5* 6.1*     CBC:   Recent Labs   Lab 07/04/19  0125   WBC 17.61*   RBC 2.87*   HGB 7.6*   HCT 25.0*   *   MCV 87   MCH 26.5*   MCHC 30.4*     CMP:   Recent Labs   Lab 07/04/19  0413   *   CALCIUM 7.7*   ALBUMIN 3.1*   PROT 6.7      K 4.1   CO2 25   CL 93*   BUN 48*   CREATININE 6.4*   ALKPHOS 116   ALT 52*   AST 40   BILITOT 0.9     CRP: No results for input(s): CRP in the last 168 hours.  ESR: No results for input(s): SEDRATE in the last 168 hours.  Wound Cultures:   Recent Labs   Lab 02/01/19  0724 03/08/19  0500   LABAERO Skin delio,  no predominant organism METHICILLIN RESISTANT STAPHYLOCOCCUS AUREUS  Few       Microbiology Results (last 7 days)     ** No results found for the last 168 hours. **        Specimen (12h ago, onward)    None          Diagnostic Results:  X-ray: There are acute fractures of the proximal phalanges of the 4th toes bilaterally.  On the left, this is an obliquely oriented fracture through the shaft without significant displacement.  On the right, the fracture is likely comminuted.  Fracture lines do not definitely extend to the articular surfaces.  There is also some cortical irregularity of the proximal phalanx of the left 5th toe but no definite fracture line is seen.  There are degenerative changes with joint space narrowing at the interphalangeal joints of the toes bilaterally and at the left 1st metatarsophalangeal joint.  There are bilateral calcaneal spurs.  There is diffuse osseous demineralization.  There is a mild hallux valgus on the left.    Venous US:  ordered    Clinical Findings:  Partial thickness ulcer to left great toe with hyperkeratotic covering. No purulence, erythema, edema, or malodor    Hyperkeratotic  lesion to right great toe.  No purulence, erythema, edema, or malodor    Ulcer Location: right plantar heel  Measurements:2.0x0.4x0.1  Periwound: viable  Drainage: serosanguinous.  Base:  100% granular. 0% fibrin.   Signs of infection: None.    Diffuse xerosis to feet

## 2019-07-04 NOTE — PT/OT/SLP EVAL
Physical Therapy Evaluation    Patient Name:  Sisi Medel   MRN:  [id number]    Recommendations:     Discharge Recommendations:  nursing facility, skilled(may progress to Home health)   Discharge Equipment Recommendations: commode, transfer board(drop arm BSC and transfer board, pending progress and WB status)   Barriers to discharge: Inaccessible home and Decreased caregiver support    Assessment:     Sisi Medel is a 58 y.o. female admitted with a medical diagnosis of <principal problem not specified>. S/p fall off w/c ramp in w/c and w/ dx of SDH She presents with the following impairments/functional limitations:  impaired endurance, gait instability, impaired functional mobilty, impaired balance, decreased lower extremity function, decreased safety awareness, pain, impaired cardiopulmonary response to activity, orthopedic precautions Per chart review from recent SNF admission and patient report, she is NWB RLE per podiatry. She was on her way to podiatry appointment, per her report, when she fell off ramp in w/c at her home. Patient was able to stand at EOB and take a few steps toward HOB w/ RW, but she was unable to maintain NWB RLE..    Rehab Prognosis: Good; patient would benefit from acute skilled PT services to address these deficits and reach maximum level of function.    Recent Surgery: * No surgery found *      Plan:     During this hospitalization, patient to be seen 4 x/week to address the identified rehab impairments via gait training, therapeutic activities, therapeutic exercises and progress toward the following goals:    · Plan of Care Expires:  08/03/19    Subjective     Chief Complaint: has not had breakfast  Patient/Family Comments/goals: Patient agreeable to session; asking for breakfast  Pain/Comfort:  Pain Rating 1: 8/10  Location - Side 1: Right  Location 1: leg  Pain Addressed 1: Distraction  Pain Rating Post-Intervention 1: 8/10  Pain Rating 2: 7/10  Location - Side 2:  Left  Location 2: leg  Pain Addressed 2: Distraction  Pain Rating Post-Intervention 2: 7/10    Patients cultural, spiritual, Restorationist conflicts given the current situation:      Living Environment:  Patient lives w/ her /significant other in H w/ 6 [place] w/ BHR. Her weight bearing status was recently changed to RLE NWB by podiatry, while at SNF, and she had a ramp built prior to her d/c home. She reports the ramp needs to be reworked, as she fell off of it in w/c on the way to podiatry appointment.  Prior to admission, patients level of function was needs assistance, RLE NWB and unable to maintain; w/c and ramp used.  Equipment used at home: bedside commode, oxygen, wheelchair, rollator.  DME owned (not currently used): none.  Upon discharge, patient will have assistance from mother, s/o. Home alone some as family works.    Objective:     Communicated with nurse prior to session.  Patient found HOB elevated with oxygen, telemetry, blood pressure cuff, peripheral IV, PICC line  upon PT entry to room.    General Precautions: Standard, fall   Orthopedic Precautions:RLE non weight bearing(from chart and pt report prior SNF admission)   Braces:       Exams:  · Cognitive Exam:  Patient is oriented to Person, Place, Time and Situation  · Postural Exam:  Patient presented with the following abnormalities:    · -       No postural abnormalities identified  · Skin Integrity/Edema:      · -       R foot wrapped, kerlix. L foot: kerlix was off, noted an application on plantar foot - PT slid patient foot back into kerlix and nurse aware. Edema noted, minimal.   · RUE ROM: WFL  · RUE Strength: WFL  · LUE ROM: WFL  · LUE Strength: WFL  · RLE ROM: WFL except ankle DF to approx neutral  · RLE Strength: WFL except ankle NT d/t NWB RLE per notes from SNF, grossly functional   · LLE ROM: WFL except ankle DF to approx neutral  · LLE strength: WFL    Functional Mobility:  · Bed Mobility:     · Supine to Sit: stand by assistance  w/ use of side rail and HOB minimally elevated  · Sit to Supine: moderate assistance for LEs  · Transfers:     · Sit to Stand:  contact guard assistance with rolling walker, cues/ assist for NWB RLE, but patient does not maintain, (darco shoes donned BLE for functional mobility)  · Gait: Patient stands w/ RW and CGA (second person for safety). Takes 3 sidesteps to left toward HOB w/ RW and CGA. Cues for RLE NWB, patient unable to maintain. Verbalizes understanding.  · Balance: sits EOB for MMT w/o LOB; stands w/ RW and CGA but unable to maintain RLE NWB       Therapeutic Activities and Exercises:   patient educated in POC; need to maintain RLE NWB until podiatry assesses/advances  Patient reports family will improve the ramp    AM-PAC 6 CLICK MOBILITY  Total Score:      Patient left HOB elevated with all lines intact, call button in reach and nurse present.    GOALS:   Multidisciplinary Problems     Physical Therapy Goals        Problem: Physical Therapy Goal    Goal Priority Disciplines Outcome Goal Variances Interventions   Physical Therapy Goal     PT, PT/OT      Description:  Goals to be met by: 2019     Patient will increase functional independence with mobility by performin. Supine to sit with Modified [place]  2. Sit to supine with supervision  3. Sit to stand transfer with Contact Guard Assistance with RW and maintaining any weight bearing precautions  4. Bed to chair transfer with Contact Guard Assistance using Rolling Walker and maintaining any weight bearing precautions  4B. Bed to w/c transfer using sliding board and maintaining any weight bearing precautions w/ min assist  5. Gait  x 30 feet with Contact Guard Assistance using Rolling Walker. and maintaining any weight bearing precautions  6. Lower extremity exercise program x20 reps per handout, with assistance as needed                       History:     Past Medical History:   Diagnosis Date    Anemia in ESRD (end-stage renal  disease) 3/7/2016    Anticoagulant long-term use     Cervical radiculopathy 2015    CHF (congestive heart failure)     Chronic combined systolic and diastolic heart failure 2013    EF 20% , improved with Medical therapy, negative PET     Chronic respiratory failure with hypoxia 2013    On home oxygen at 2-5 liters    Closed fracture of proximal end of right fibula 2016    Complications due to renal dialysis device, implant, and graft     DDD (degenerative disc disease), lumbar 2015    Dependence on renal dialysis     Diabetes mellitus type II, controlled 2017    ESRD on hemodialysis 2016    Essential hypertension     Gout     Lateral meniscal tear 2016    Lumbar stenosis 2015    Obesity, Class III, BMI 40-49.9 (morbid obesity)     Pacemaker     Paroxysmal atrial fibrillation 2014    Not on anticoagulation    Peripheral neuropathy 2013    Personal history of gastric ulcer 3/19/2013    Pneumonia of left lower lobe due to infectious organism 3/10/2019    Sarcoidosis, lung 2009    Diagnosed in  with ocular manifestation, on 4L home O2 and PO steroids    Secondary pulmonary hypertension 2015    Seizure disorder 3/19/2013    Shingles 2013    Thyroid disease        Past Surgical History:   Procedure Laterality Date    ABDOMINAL SURGERY      ABLATION N/A 2017    [facility] CATH LAB    ANGIOPLASTY Left 1/10/2018    [facility] by [facility] at [facility] OR 2ND FLR    ANGIOPLASTY-PERCUTANEOUS TRANSLUMINAL (PTA) Left 2017    [facility] at [facility] OR 2ND FLR    ANGIOPLASTY-PERCUTANEOUS TRANSLUMINAL (PTA) Left 10/12/2016    [facility] at [facility] OR 2ND FLR    CARDIAC PACEMAKER PLACEMENT       SECTION      x2    DECLOT GRAFT PERCUTANEOUS Left 2017    [facility]     DECLOT-GRAFT Left 6/21/2016    [facility] by [facility] CATH LAB    DECLOT-GRAFT Left 6/20/2016    [facility]formed by [facility] MD at [facility] CATH LAB    ECHOCARDIOGRAM-TRANSESOPHAGEAL N/A 6/27/2013    Performed by Del[facility] Surgeon at [facility] DELMY    Fistulogram Left 4/8/2019    [facility]formed by [facility] at [facility] CATH LAB    fistulogram Left 1/10/2018    Performed by [facility] at [facility] OR 2ND FLR    FISTULOGRAM Left 9/13/2017    Performed by [facility] at [facility] CATH LAB    FISTULOGRAM Left 10/12/2016    Performed by [facility] at [facility] OR 2ND FLR    FISTULOGRAM Left 6/21/2016    Performed by [facility]it Starr MD at [facility] CATH LAB    Fistulogram, LUE AVG, possible intervention Left 1/30/2019    Per[facility] by [facility] at [facility] CATH LAB    Fistulogram, OR 11 Left 5/8/2019    Performed by [facility] at [facility] OR 2ND FLR    INJECTION-STEROID-EPIDURAL-TRANSFORAMINAL Right 7/20/2015    [facility] at [facility] PAIN MGT    INJECTION-STEROID-EPIDURAL-TRANSFORAMINAL Right 5/21/2015    [facility] by [facility] at [facility] PAIN MGT    [id number]-[id number] / Left upper AV graft. Left 2/3/2016    [facility] by [facility] at [facility] OR 2ND FLR    YSDROMNBI-[id number] N/A 1/11/2017    Performed by Bl[facility]imir Adorno MD at [facility] CATH LAB    OTHER SURGICAL HISTORY      loop recorder implant    PLACEMENT-STENT Left 2/7/2017    [facility] by [facility] at [facility] OR 2ND FLR    PTA, AV FISTULA N/A 1/30/2019    Performed by [facility] at [facility] CATH LAB    stent to fistula      TRANSESOPHAGEAL ECHOCARDIOGRAM (JARAD) N/A 6/27/2016    Performed by [facility]rav Machuca MD at [facility] CATH LAB    ULTRASOUND, UPPER GI TRACT, ENDOSCOPIC N/A 1/9/2014    Performed by Stewart Burgess MD at [facility] ENDO (2ND FLR)    VASCULAR SURGERY      fistula to L upper arm        Time Tracking:     PT Received On:  07/04/19  PT Start Time: 0852     PT Stop Time: 0917  PT Total Time (min): 25 min     Billable Minutes: Evaluation 25co-chivoal w/ OT      Megan Licona, PT  07/04/2019

## 2019-07-04 NOTE — CONSULTS
[facility]  Podiatry  Consult Note    Patient Name: Sisi Medel  MRN: [id number]  Admission Date: 7/3/2019  Hospital Length of Stay: 1 days  Attending Physician: Kush Khan MD  Primary Care Provider: Carlos A Caputo MD     Inpatient consult to Podiatry  Consult performed by: Emeka Diaz MD  Consult ordered by: Mindy Larios MD        Subjective:     History of Present Illness:  Sisi Medel is a 58 y.o. female who  has a past medical history of Anemia in ESRD (end-stage renal disease), Anticoagulant long-term use, Cervical radiculopathy, CHF (congestive heart failure), Chronic combined systolic and diastolic heart failure, Chronic respiratory failure with hypoxia, Closed fracture of proximal end of right fibula, Complications due to renal dialysis device, implant, and graft, DDD (degenerative disc disease), lumbar, Dependence on renal dialysis, Diabetes mellitus type II, controlled, ESRD on hemodialysis, Essential hypertension, Gout, Lateral meniscal tear, Lumbar stenosis, Obesity, Class III, BMI 40-49.9 (morbid obesity), Pacemaker, Paroxysmal atrial fibrillation, Peripheral neuropathy, Personal history of gastric ulcer, Pneumonia of left lower lobe due to infectious organism, Sarcoidosis, lung, Secondary pulmonary hypertension, Seizure disorder, Shingles, and Thyroid disease.    Patient Admited for SDH.  Consulted to Podiatry for Lower extremity wounds.  Patient is followed by Dr. Ron for LE wounds.  Since her fall she complains of pain in her legs.  Also complains of dry feet.  Denies F/C/N/V      Scheduled Meds:   sodium chloride 0.9%   Intravenous Once    atorvastatin  80 mg Oral QHS    fludrocortisone  200 mcg Oral BID    levETIRAcetam  500 mg Oral BID    midodrine  10 mg Oral TID    predniSONE  20 mg Oral Daily     Continuous Infusions:  PRN Meds:sodium chloride 0.9%, acetaminophen, Dextrose 10% Bolus, glucagon (human recombinant), insulin aspart  U-100, ondansetron    Review of patient's allergies indicates:   Allergen Reactions    Bactrim [sulfamethoxazole-trimethoprim] Other (See Comments)     Causes renal failure    Nsaids (non-steroidal anti-inflammatory drug) Other (See Comments)     Renal failure        Past Medical History:   Diagnosis Date    Anemia in ESRD (end-stage renal disease) 3/7/2016    Anticoagulant long-term use     Cervical radiculopathy 7/20/2015    CHF (congestive heart failure)     Chronic combined systolic and diastolic heart failure 6/14/2013    EF 20% 2013, improved with Medical therapy, negative PET 2013    Chronic respiratory failure with hypoxia 04/22/2013    On home oxygen at 2-5 liters    Closed fracture of proximal end of right fibula 6/27/2016    Complications due to renal dialysis device, implant, and graft     DDD (degenerative disc disease), lumbar 6/17/2015    Dependence on renal dialysis     Diabetes mellitus type II, controlled 12/8/2017    ESRD on hemodialysis 2/23/2016    Essential hypertension     Gout     Lateral meniscal tear 5/31/2016    Lumbar stenosis 6/17/2015    Obesity, Class III, BMI 40-49.9 (morbid obesity)     Pacemaker     Paroxysmal atrial fibrillation 5/16/2014    Not on anticoagulation    Peripheral neuropathy 11/13/2013    Personal history of gastric ulcer 3/19/2013    Pneumonia of left lower lobe due to infectious organism 3/10/2019    Sarcoidosis, lung 2009    Diagnosed in 2009 with ocular manifestation, on 4L home O2 and PO steroids    Secondary pulmonary hypertension 4/7/2015    Seizure disorder 3/19/2013    Shingles 11/13/2013    Thyroid disease      Past Surgical History:   Procedure Laterality Date    ABDOMINAL SURGERY      ABLATION N/A 6/12/2017    Performed by Bladimir Adorno MD at [facility] CATH LAB    ANGIOPLASTY Left 1/10/2018    Performed by Torrey Villafana MD at [facility] OR 2ND FLR    ANGIOPLASTY-PERCUTANEOUS TRANSLUMINAL (PTA) Left 2/7/2017    Performed by  [facility]rey [facility]afana MD at [facility] OR 2ND FLR    ANGIOPLASTY-PERCUTANEOUS TRANSLUMINAL (PTA) Left 10/12/2016    Performed by oTrrey Villafana MD at [facility] OR 2ND FLR    CARDIAC PACEMAKER PLACEMENT       SECTION      x2    DECLOT GRAFT PERCUTANEOUS Left 2017    Performed by Torrey [facility]afana MD at [facility] OR 2ND FLR    DECLOT-GRAFT Left 2016    Performed by Harjit Starr MD at [facility] CATH LAB    DECLOT-GRAFT Left 2016    Performed by Harjit Starr MD at [facility] CATH LAB    ECHOCARDIOGRAM-TRANSESOPHAGEAL N/A 2013    Performed by Delmy Surgeon at [facility] DELMY    Fistulogram Left 2019    Performed by Torrey Villafana MD at [facility] CATH LAB    fistulogram Left 1/10/2018    Performed by Torrey Villafana MD at [facility] OR 2ND FLR    FISTULOGRAM Left 2017    Performed by Torrey Villafana MD at [facility] CATH LAB    FISTULOGRAM Left 10/12/2016    Performed by Torrey Villafana MD at [facility] OR 2ND FLR    FISTULOGRAM Left 2016    Performed by Harjit Starr MD at [facility] CATH LAB    Fistulogram, LUE AVG, possible intervention Left 2019    Performed by Torrey Villafana MD at [facility] CATH LAB    Fistulogram, OR 11 Left 2019    Performed by Torrey Villafana MD at [facility] OR [facility] FLR    INJECTION-STEROID-EPIDURAL-TRANSFORAMINAL Right 2015    Performed by Patria Gutierrez MD at [facility] PAIN MGT    INJECTION-STEROID-EPIDURAL-TRANSFORAMINAL Right 2015    Performed by Patria Gutierrez MD at [facility] PAIN MGT    KNJHRRYAW-ZIGYQ-ZUKMPSOLSBNUY / Left upper AV graft. Left 2/3/2016    Performed by Torrey Villafana MD at [facility] OR 2ND FLR    TASMUGUMA-AFMWXCCLQ-YALWZRSAYFULY N/A 2017    Performed by Bladimir Adorno MD at [facility] CATH LAB    OTHER SURGICAL HISTORY      loop recorder implant    PLACEMENT-STENT Left 2017    Performed by Torrey Villafana MD at [facility] OR 2ND FLR    PTA, AV FISTULA N/A 2019    Performed by Torrey Villafana MD at [facility] CATH LAB     stent to fistula      TRANSESOPHAGEAL ECHOCARDIOGRAM (JARAD) N/A 2016    Performed by [facility]rav Machuca MD at [facility] CATH LAB    ULTRASOUND, UPPER GI TRACT, ENDOSCOPIC N/A 2014    Performed by Stewart Burgess MD at [facility] ENDO (2ND FLR)    VASCULAR SURGERY      fistula to L upper arm        Family History     Problem Relation (Age of Onset)    Coronary artery disease Father    Diabetes Mother, Father, Maternal Grandmother    Hypertension Mother, Father    Kidney failure Mother    Lupus Sister        Tobacco Use    Smoking status: Former Smoker     Packs/day: 0.50     Years: 10.00     Pack years: 5.00     Types: Cigarettes     Last attempt to quit: 1994     Years since quittin.2    Smokeless tobacco: Never Used   Substance and Sexual Activity    Alcohol use: No     Alcohol/week: 0.0 oz     Comment: rarely    Drug use: No    Sexual activity: Not Currently     Review of Systems   Constitutional: Negative for chills and fever.   Cardiovascular: Negative for leg swelling.   Gastrointestinal: Negative for nausea and vomiting.   Musculoskeletal: Positive for myalgias. Negative for arthralgias and joint swelling.   Skin: Positive for wound. Negative for rash.   Psychiatric/Behavioral: Negative for agitation and confusion.     Objective:     Vital Signs (Most Recent):  Temp: 97.9 °F (36.6 °C) (19 1100)  Pulse: 69 (19 1500)  Resp: (!) 28 (19 1500)  BP: (!) 94/59 (19 1500)  SpO2: (!) 93 % (19 1500) Vital Signs (24h Range):  Temp:  [97.2 °F (36.2 °C)-98.8 °F (37.1 °C)] 97.9 °F (36.6 °C)  Pulse:  [69-73] 69  Resp:  [16-52] 28  SpO2:  [65 %-100 %] 93 %  BP: ()/(51-92) 94/59     Weight: 110 kg (242 lb 8.1 oz)  Body mass index is 36.87 kg/m².    Foot Exam    Laboratory:  A1C:   Recent Labs   Lab 05/10/19  0532 19  1833 19  1127   HGBA1C 6.0* 6.5* 6.1*     CBC:   Recent Labs   Lab 19  0125   WBC 17.61*   RBC 2.87*   HGB 7.6*   HCT 25.0*   *   MCV 87    MCH 26.5*   MCHC 30.4*     CMP:   Recent Labs   Lab 07/04/19  0413   *   CALCIUM 7.7*   ALBUMIN 3.1*   PROT 6.7      K 4.1   CO2 25   CL 93*   BUN 48*   CREATININE 6.4*   ALKPHOS 116   ALT 52*   AST 40   BILITOT 0.9     CRP: No results for input(s): CRP in the last 168 hours.  ESR: No results for input(s): SEDRATE in the last 168 hours.  Wound Cultures:   Recent Labs   Lab 02/01/19  0724 03/08/19  0500   LABAERO Skin delio,  no predominant organism METHICILLIN RESISTANT STAPHYLOCOCCUS AUREUS  Few       Microbiology Results (last 7 days)     ** No results found for the last 168 hours. **        Specimen (12h ago, onward)    None          Diagnostic Results:  X-ray: There are acute fractures of the proximal phalanges of the 4th toes bilaterally.  On the left, this is an obliquely oriented fracture through the shaft without significant displacement.  On the right, the fracture is likely comminuted.  Fracture lines do not definitely extend to the articular surfaces.  There is also some cortical irregularity of the proximal phalanx of the left 5th toe but no definite fracture line is seen.  There are degenerative changes with joint space narrowing at the interphalangeal joints of the toes bilaterally and at the left 1st metatarsophalangeal joint.  There are bilateral calcaneal spurs.  There is diffuse osseous demineralization.  There is a mild hallux valgus on the left.    Venous US:  ordered    Clinical Findings:  Partial thickness ulcer to left great toe with hyperkeratotic covering. No purulence, erythema, edema, or malodor    Hyperkeratotic lesion to right great toe.  No purulence, erythema, edema, or malodor    Ulcer Location: right plantar heel  Measurements:2.0x0.4x0.1  Periwound: viable  Drainage: serosanguinous.  Base:  100% granular. 0% fibrin.   Signs of infection: None.    Diffuse xerosis to feet                   Assessment/Plan:     Fall at home, initial encounter  Recommend PT/OT      SDH  (subdural hematoma)  Per neuro critical care    Anemia in ESRD (end-stage renal disease)  Per primary      Ulcer of lower limb  Sisi Medel is a 58 y.o. female with Right plantar heel wound, calluses on b/l halluxes with partial thickness ulcer on left., stable, no clinical signs of infection.    -Wounds dressed with iodosorb and aquacel boarder dressing, cast padding and coban, 4x4's, kerlix, and ace.  Nursing to do dressing changes with medihoney and aquacel boarders.  -ordered venous ultrasound to rule out DVT  -x-ray showing b/l 4th toe fractures, non displaced, will treat conservatively with Weight bearing in darco shoe.    Calluses were trimmed, and dead skin removed.  No surgical intervention indicated at this time.  Ordered lotion to be applied to feet daily, nursing to do dressing changes.  Podiatry will sign off.    Weight bearing status:  WBAT in protective shoe  Offloading device: Darco shoe    DC Instructions:  Patient is to follow up with podiatry within 2 weeks of discharge.  Call [phone].  Home health to do dressing changes every week by applying medihoney and protective football as follows: apply medihoney to wounds, dress with 4x4's, cast padding, and coban            Thank you for your consult. I will sign off. Please contact us if you have any additional questions.    Emeka Vu MD  Podiatry  [facility]

## 2019-07-04 NOTE — PLAN OF CARE
Problem: Adult Inpatient Plan of Care  Goal: Plan of Care Review  POC reviewed with pt at 0600. Pt verbalized understanding. Questions and concerns addressed. No acute events overnight. Pt progressing toward goals. Will continue to monitor. See flowsheets for full assessment and VS info.     - Supplemental insulin given   - refused bath/gown change multiple times throughout the night  - AAOx4

## 2019-07-04 NOTE — PLAN OF CARE
Problem: Adult Inpatient Plan of Care  Goal: Plan of Care Review  POC reviewed with pt and family at 1700. Pt verbalized understanding. Questions and concerns addressed. No acute events today. Pt progressing toward goals. Will continue to monitor. See flowsheets for full assessment and VS info.

## 2019-07-04 NOTE — PLAN OF CARE
Problem: SLP Goal  Goal: SLP Goal  Goals expected to be met by 7/11:   1. Pt will name >10 items in concrete category within one minute IND.  2. Pt will complete mental manipulation of 4 word sets with 90% acc given min cues.  3. Pt will complete functional math word problems (time/money) with 80% acc given min cues.  4. Pt will recall daily events given supervision.   Outcome: Ongoing (interventions implemented as appropriate)  Bedside swallow and cognitive-linguistic evaluations completed. Recommend continue regular consistency diet with thin liquids. ST to f/u to address cognitive deficits. Rhea Daly CCC-SLP 7/4/2019 2:26 PM

## 2019-07-04 NOTE — ASSESSMENT & PLAN NOTE
--ESRD on HD, fistula left upper arm, last dialysis Tuesday (7/2).   --nephrology consulted- appreciate reccs  - Short course of dialysis with minimal to no fluid removal 7/4

## 2019-07-04 NOTE — SUBJECTIVE & OBJECTIVE
Interval History: Patient evaluated bedside, stable vital signs, today more alert and talkative.  Night uneventful.    Review of patient's allergies indicates:   Allergen Reactions    Bactrim [sulfamethoxazole-trimethoprim] Other (See Comments)     Causes renal failure    Nsaids (non-steroidal anti-inflammatory drug) Other (See Comments)     Renal failure     Current Facility-Administered Medications   Medication Frequency    0.9%  NaCl infusion PRN    0.9%  NaCl infusion Once    acetaminophen tablet 650 mg Q4H PRN    atorvastatin tablet 80 mg QHS    dextrose 10% (D10W) Bolus PRN    fludrocortisone tablet 200 mcg BID    glucagon (human recombinant) injection 1 mg PRN    insulin aspart U-100 pen 1-10 Units Q6H PRN    levETIRAcetam tablet 500 mg BID    midodrine tablet 10 mg TID    ondansetron disintegrating tablet 8 mg Q8H PRN    predniSONE tablet 20 mg Daily       Objective:     Vital Signs (Most Recent):  Temp: 97.8 °F (36.6 °C) (07/04/19 0700)  Pulse: 69 (07/04/19 0900)  Resp: (!) 32 (07/04/19 0900)  BP: 103/63 (07/04/19 0900)  SpO2: 95 % (07/04/19 0900)  O2 Device (Oxygen Therapy): nasal cannula (07/04/19 0700) Vital Signs (24h Range):  Temp:  [97.2 °F (36.2 °C)-98.8 °F (37.1 °C)] 97.8 °F (36.6 °C)  Pulse:  [68-73] 69  Resp:  [16-52] 32  SpO2:  [65 %-100 %] 95 %  BP: ()/(51-92) 103/63     Weight: 110 kg (242 lb 8.1 oz) (07/04/19 0103)  Body mass index is 36.87 kg/m².  Body surface area is 2.3 meters squared.    I/O last 3 completed shifts:  In: 1650 [P.O.:1100; IV Piggyback:550]  Out: -     Physical Exam   Constitutional: She is oriented to person, place, and time.   Alert, critically ill, more active and talkative this morning.   HENT:   Head: Normocephalic.   Eyes: Pupils are equal, round, and reactive to light. EOM are normal.   Neck: Neck supple.   Cardiovascular: Normal rate.   Pulmonary/Chest: Effort normal. No respiratory distress. She has no wheezes.   Abdominal: Soft. Bowel sounds are  normal.   Musculoskeletal: She exhibits no edema.   Neurological: She is alert and oriented to person, place, and time.   Skin: Skin is warm.   Nursing note and vitals reviewed.      Significant Labs:  CBC:   Recent Labs   Lab 07/04/19  0125   WBC 17.61*   RBC 2.87*   HGB 7.6*   HCT 25.0*   *   MCV 87   MCH 26.5*   MCHC 30.4*     CMP:   Recent Labs   Lab 07/04/19  0413   *   CALCIUM 7.7*   ALBUMIN 3.1*   PROT 6.7      K 4.1   CO2 25   CL 93*   BUN 48*   CREATININE 6.4*   ALKPHOS 116   ALT 52*   AST 40   BILITOT 0.9     All labs within the past 24 hours have been reviewed.
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
## 2019-07-04 NOTE — PROGRESS NOTES
Hemodialysis set up at bedside in room 9095. Tx initiated via left arm AVF with 15g needles x 2 without difficulty. Orders and machine settings verified. Tx started. 300 BFR established. VSS.        Hemodialysis bedside 1:1 tx

## 2019-07-04 NOTE — PROGRESS NOTES
[facility]  Neurology  Progress Note    Patient Name: Sisi Medel  MRN: [id number]  Admission Date: 7/3/2019  Hospital Length of Stay: 1 days  Code Status: Full Code   Attending Provider: Kush Khan MD  Primary Care Physician: Carlos A Caputo MD   Principal Problem:<principal problem not specified>      Subjective:     Interval History: NAEON    Current Neurological Medications:     Current Facility-Administered Medications   Medication Dose Route Frequency Provider Last Rate Last Dose    0.9%  NaCl infusion   Intravenous PRN Grant Gomes MD        0.9%  NaCl infusion   Intravenous Once Grant Gomes MD        acetaminophen tablet 650 mg  650 mg Oral Q4H PRN Eugenia Miinea, NP   650 mg at 07/03/19 2053    atorvastatin tablet 80 mg  80 mg Oral QHS Eugenia Miinea, NP   80 mg at 07/03/19 2058    dextrose 10% (D10W) Bolus  12.5 g Intravenous PRN Eugenia Miinea, NP        fludrocortisone tablet 200 mcg  200 mcg Oral BID Eugenia Miinea, NP   200 mcg at 07/04/19 0904    glucagon (human recombinant) injection 1 mg  1 mg Intramuscular PRN Eugenia Miinea, NP        insulin aspart U-100 pen 1-10 Units  1-10 Units Subcutaneous Q6H PRN Eugenia Miinea, NP   2 Units at 07/04/19 0554    levETIRAcetam tablet 500 mg  500 mg Oral BID Eugenia Miinea, NP   500 mg at 07/04/19 0904    midodrine tablet 10 mg  10 mg Oral TID Eugenia Miinea, NP   10 mg at 07/04/19 0904    ondansetron disintegrating tablet 8 mg  8 mg Oral Q8H PRN Eugenia Miinea, NP        predniSONE tablet 20 mg  20 mg Oral Daily Eugenia Miinea, NP   20 mg at 07/04/19 0904       Review of Systems   Constitutional: Negative.    Eyes: Negative.    Respiratory: Positive for shortness of breath.         On home O2   Cardiovascular: Negative.    Endocrine: Negative.    Genitourinary: Negative.    Musculoskeletal: Positive for back pain and gait problem.   Skin:        Wounds/ulcers in lower extremities    Allergic/Immunologic: Negative.    Psychiatric/Behavioral: Negative.      Objective:     Vital Signs (Most Recent):  Temp: 97.8 °F (36.6 °C) (07/04/19 0700)  Pulse: 69 (07/04/19 0900)  Resp: (!) 32 (07/04/19 0900)  BP: 103/63 (07/04/19 0900)  SpO2: 95 % (07/04/19 0900) Vital Signs (24h Range):  Temp:  [97.2 °F (36.2 °C)-98.8 °F (37.1 °C)] 97.8 °F (36.6 °C)  Pulse:  [68-73] 69  Resp:  [16-52] 32  SpO2:  [65 %-100 %] 95 %  BP: ()/(51-92) 103/63     Weight: 110 kg (242 lb 8.1 oz)  Body mass index is 36.87 kg/m².    Physical Exam   Constitutional: She is oriented to person, place, and time. She appears well-developed and well-nourished.   HENT:   Head: Normocephalic.   Eyes: Pupils are equal, round, and reactive to light. Conjunctivae and EOM are normal.   Cardiovascular: Normal rate and regular rhythm.   H/o Afib   Pulmonary/Chest:   On O2 via NC   Neurological: She is alert and oriented to person, place, and time. She displays abnormal reflex. A sensory deficit is present. No cranial nerve deficit. She exhibits normal muscle tone.   Skin:   Wounds wrapped in bandage in distal lower extremities   Psychiatric: She has a normal mood and affect. Her speech is normal and behavior is normal. Judgment and thought content normal.       NEUROLOGICAL EXAMINATION:     MENTAL STATUS   Oriented to person, place, and time.   Attention: normal.   Speech: speech is normal   Level of consciousness: alert    CRANIAL NERVES   Cranial nerves II through XII intact.     CN III, IV, VI   Pupils are equal, round, and reactive to light.  Extraocular motions are normal.     MOTOR EXAM   Muscle bulk: normal  Overall muscle tone: normal  Right arm tone: normal  Left arm tone: normal  Right arm pronator drift: absent  Left arm pronator drift: absent  Right leg tone: normal  Left leg tone: normal    Strength   Right neck flexion: 5/5  Left neck flexion: 5/5  Right neck extension: 5/5  Left neck extension: 5/5  Right deltoid: 5/5  Left  deltoid: 5/5  Right biceps: 5/5  Left biceps: 5/5  Right triceps: 5/5  Left triceps: 5/5  Right wrist flexion: 5/5  Left wrist flexion: 5/5  Right wrist extension: 5/5  Left wrist extension: 5/  Right interossei: 5/5  Left interossei: 5/  Right abdominals: 5/  Left abdominals: 5/  Right iliopsoas: 5/  Left iliopsoas: 5/  Right quadriceps: 5/5  Left quadriceps: 5/5  Right hamstrin/5  Left hamstrin/5  Right glutei: 5/  Left glutei: 5  Right anterior tibial: 5  Left anterior tibial: 5  Right posterior tibial: 5  Left posterior tibial: 5  Right peroneal: 5  Left peroneal: 5  Right gastroc: 5  Left gastroc: 5    REFLEXES     Reflexes   Right plantar: normal  Left plantar: normal       symmetric     SENSORY EXAM   Right leg light touch: decreased from knee  Left leg light touch: decreased from knee  Right leg vibration: decreased from ankle  Left leg vibration: decreased from ankle  Right leg proprioception: decreased from knee  Left leg proprioception: decreased from knee      Significant Labs:   Recent Lab Results       19  0553   19  0413   19  0248   19  0131   19  0125        Albumin   3.1           Alkaline Phosphatase   116           ALT   52           Anion Gap   18           AST   40           Baso #         0.02     Basophil%         0.1     BILIRUBIN TOTAL   0.9  Comment:  For infants and newborns, interpretation of results should be based  on gestational age, weight and in agreement with clinical  observations.  Premature Infant recommended reference ranges:  Up to 24 hours.............<8.0 mg/dL  Up to 48 hours............<12.0 mg/dL  3-5 days..................<15.0 mg/dL  6-29 days.................<15.0 mg/dL             BUN, Bld   48           Calcium   7.7           Chloride   93           CO2   25           Creatinine   6.4           Differential Method         Automated     eGFR if    7.7           eGFR if non     6.6  Comment:  Calculation used to obtain the estimated glomerular filtration  rate (eGFR) is the CKD-EPI equation.              Eos #         0.0     Eosinophil%         0.1     Glucose   158           Gran # (ANC)         15.8     Gran%         89.4     Hematocrit         25.0     Hemoglobin         7.6     Immature Grans (Abs)         0.11  Comment:  Mild elevation in immature granulocytes is non specific and   can be seen in a variety of conditions including stress response,   acute inflammation, trauma and pregnancy. Correlation with other   laboratory and clinical findings is essential.       Immature Granulocytes         0.6     Coumadin Monitoring INR     1.3  Comment:  Coumadin Therapy:  2.0 - 3.0 for INR for all indicators except mechanical heart valves  and antiphospholipid syndromes which should use 2.5 - 3.5.           Lymph #         0.4     Lymph%         2.4     Magnesium   2.0           MCH         26.5     MCHC         30.4     MCV         87     Mono #         1.3     Mono%         7.4     MPV         12.4     nRBC         1     Phosphorus   5.7           Platelets         354     POCT Glucose 163     254       Potassium   4.1           PROTEIN TOTAL   6.7           Protime     13.4         RBC         2.87     RDW         23.9     Sodium   136           WBC         17.61                      07/03/19  2356   07/03/19  1844   07/03/19  1549   07/03/19  1325        Albumin             Alkaline Phosphatase             ALT             Anion Gap             AST             Baso #             Basophil%             BILIRUBIN TOTAL             BUN, Bld             Calcium             Chloride             CO2             Creatinine             Differential Method             eGFR if              eGFR if non              Eos #             Eosinophil%             Glucose             Gran # (ANC)             Gran%             Hematocrit             Hemoglobin              Immature Grans (Abs)             Immature Granulocytes             Coumadin Monitoring INR     1.4  Comment:  Coumadin Therapy:  2.0 - 3.0 for INR for all indicators except mechanical heart valves  and antiphospholipid syndromes which should use 2.5 - 3.5.         Lymph #             Lymph%             Magnesium             MCH             MCHC             MCV             Mono #             Mono%             MPV             nRBC             Phosphorus             Platelets             POCT Glucose 324 182   99     Potassium             PROTEIN TOTAL             Protime     14.2       RBC             RDW             Sodium             WBC                   Significant Imaging: I have reviewed all pertinent imaging results/findings within the past 24 hours.  I have reviewed and interpreted all pertinent imaging results/findings within the past 24 hours.    Assessment and Plan:     Obese  Nutrition recs  Diet modification and counseling  Stroke prevention    Fall at home, initial encounter  --s/p fall at home from wheelchair  --XR alia pelvis-No acute displaced fracture-dislocation identified.  --XR L femur-Visualized osseous structures appear intact, with no definite evidence of recent fracture or other significant abnormality identified.  -XR L knee-No evidence of recent fracture or other significant detrimental interval change since the examinations referenced above.  Old healed left proximal fibular shaft fracture is noted.   --fall precautions  --PT/OT    SDH (subdural hematoma)  --  Continue Neuro checks q 1hr  --  Neurosurgery consulted-no surg intevention  -- CT Head reveals--Interval development of a thin hyperdense extra-axial collection overlying the right tentorium cerebella most compatible with acute subdural hemorrhage.  No significant mass effect or midline shift.  --  repeat CTH stable  --  Pt was on Coumadin and Plavix at home, INR on admit 3.4, holding AC  --  follow PT INR daily  --PCC, DDAVP and vit.  K received  --  PT/OT/SLP  --  SBP goal <140, MAP >60    Transaminitis  --present on admission  --trending down  -follow CMP daily            Anemia in ESRD (end-stage renal disease)    -H&H stable on admit   -follow CBC/ CMP daily        Paroxysmal A-fib  --pt at home on warfarin-  --hold anticoagulation for now  --pending repeat CTH on Monday 7/8    Ulcer of lower limb  -- various stages of bilateral lower legs wounds  -- Podiatry consulted-appreciate reccs    Orthostatic hypotension  continue home med Midodrine  -map goal >60    ESRD on hemodialysis    --ESRD on HD, fistula left upper arm, last dialysis Tuesday (7/2).   --nephrology consulted- appreciate reccs  - Short course of dialysis with minimal to no fluid removal 7/4      Biventricular cardiac pacemaker in situ  Biventricular cardiac pacemaker in situ  -pacemaker      Weakness generalized   PT/OT  Fall precautions      Hyperlipidemia  --cont home med atorvastatin      Epilepsy  --Hx of seizures for which she is on keppra at home  --continue Keppra 500 mg BID  --Seizure precautions           VTE Risk Mitigation (From admission, onward)    None          Mindy Larios MD  Neurology  [facility]

## 2019-07-04 NOTE — SUBJECTIVE & OBJECTIVE
Interval History: NAEON. AAO x 4. Repeat CT head stable. Mild headache controlled with medication. Denies visual changes, weakness, worsening paresthesias. Diabetic neuropathy BLE at baseline. Repeat INR today 1.3.     Medications:  Continuous Infusions:  Scheduled Meds:   atorvastatin  80 mg Oral QHS    fludrocortisone  200 mcg Oral BID    levETIRAcetam  500 mg Oral BID    midodrine  10 mg Oral TID    predniSONE  20 mg Oral Daily     PRN Meds:acetaminophen, Dextrose 10% Bolus, glucagon (human recombinant), insulin aspart U-100, ondansetron       Objective:     Weight: 110 kg (242 lb 8.1 oz)  Body mass index is 36.87 kg/m².  Vital Signs (Most Recent):  Temp: 97.8 °F (36.6 °C) (07/04/19 0700)  Pulse: 69 (07/04/19 0900)  Resp: (!) 32 (07/04/19 0900)  BP: 103/63 (07/04/19 0900)  SpO2: 95 % (07/04/19 0900) Vital Signs (24h Range):  Temp:  [97.2 °F (36.2 °C)-98.8 °F (37.1 °C)] 97.8 °F (36.6 °C)  Pulse:  [68-73] 69  Resp:  [16-52] 32  SpO2:  [65 %-100 %] 95 %  BP: ()/(51-92) 103/63                Oxygen Concentration (%):  [3-36] 36         Hemodialysis AV Graft Left upper arm (Active)       Neurosurgery Physical Exam    General: obese, no distress.   Head: normocephalic, atraumatic  Neurologic: Alert and oriented. Thought content appropriate.  GCS: Motor: 6/Verbal: 5/Eyes: 4 GCS Total: 15  Mental Status: Awake, Alert, Oriented x 4  Language: No aphasia  Speech: No dysarthria  Cranial nerves: face symmetric, tongue midline, CN II-XII grossly intact.   Eyes: pupils equal, round, reactive to light with accomodation, EOMI.  Pulmonary: normal respirations, no signs of respiratory distress, nasal cannula in place  Abdomen: soft, non-distended, not tender to palpation  Sensory: intact to light touch throughout  Motor Strength: Moves all extremities spontaneously with good tone. Follow commands in all 4 extremities. Grossly full strength in all 4 extremities. No abnormal movements seen. Exam limited to right foot,  wrapped in dressing, per Podiatry [facility].  DTR's - 2 + and symmetric in UE and LE  Pronator Drift: no drift noted  Finger-to-nose: Intact bilaterally      Significant Labs:  Recent Labs   Lab 07/03/19  1127 07/04/19  0413   * 158*   * 136   K 3.9 4.1   CL 88* 93*   CO2 30* 25   BUN 39* 48*   CREATININE 5.3* 6.4*   CALCIUM 8.0* 7.7*   MG 2.0 2.0     Recent Labs   Lab 07/03/19  1127 07/04/19  0125   WBC 11.04 17.61*   HGB 7.9* 7.6*   HCT 26.9* 25.0*    354*     Recent Labs   Lab 07/03/19  1127 07/03/19  1549 07/04/19  0248   INR 3.4* 1.4* 1.3*     Microbiology Results (last 7 days)     ** No results found for the last 168 hours. **        Recent Lab Results       07/04/19  0553   07/04/19  0413   07/04/19  0248   07/04/19  0131   07/04/19  0125        Albumin   3.1           Alkaline Phosphatase   116           ALT   52           Anion Gap   18           AST   40           Baso #         0.02     Basophil%         0.1     BILIRUBIN TOTAL   0.9  Comment:  For infants and newborns, interpretation of results should be based  on gestational age, weight and in agreement with clinical  observations.  Premature Infant recommended reference ranges:  Up to 24 hours.............<8.0 mg/dL  Up to 48 hours............<12.0 mg/dL  3-5 days..................<15.0 mg/dL  6-29 days.................<15.0 mg/dL             BUN, Bld   48           Calcium   7.7           Chloride   93           CO2   25           Creatinine   6.4           Differential Method         Automated     eGFR if    7.7           eGFR if non    6.6  Comment:  Calculation used to obtain the estimated glomerular filtration  rate (eGFR) is the CKD-EPI equation.              Eos #         0.0     Eosinophil%         0.1     Estimated Avg Glucose               Glucose   158           Gran # (ANC)         15.8     Gran%         89.4     Hematocrit         25.0     Hemoglobin         7.6     Hemoglobin A1C External                Immature Grans (Abs)         0.11  Comment:  Mild elevation in immature granulocytes is non specific and   can be seen in a variety of conditions including stress response,   acute inflammation, trauma and pregnancy. Correlation with other   laboratory and clinical findings is essential.       Immature Granulocytes         0.6     Coumadin Monitoring INR     1.3  Comment:  Coumadin Therapy:  2.0 - 3.0 for INR for all indicators except mechanical heart valves  and antiphospholipid syndromes which should use 2.5 - 3.5.           Lymph #         0.4     Lymph%         2.4     Magnesium   2.0           MCH         26.5     MCHC         30.4     MCV         87     Mono #         1.3     Mono%         7.4     MPV         12.4     nRBC         1     Phosphorus   5.7           Platelets         354     POCT Glucose 163     254       Potassium   4.1           PROTEIN TOTAL   6.7           Protime     13.4         RBC         2.87     RDW         23.9     Sodium   136           TSH               WBC         17.61                      07/03/19  2356   07/03/19  1844   07/03/19  1549   07/03/19  1325   07/03/19  1127        Albumin         3.1     Alkaline Phosphatase         119     ALT         55     Anion Gap         17     AST         47     Baso #         0.03     Basophil%         0.3     BILIRUBIN TOTAL         1.0  Comment:  For infants and newborns, interpretation of results should be based  on gestational age, weight and in agreement with clinical  observations.  Premature Infant recommended reference ranges:  Up to 24 hours.............<8.0 mg/dL  Up to 48 hours............<12.0 mg/dL  3-5 days..................<15.0 mg/dL  6-29 days.................<15.0 mg/dL       BUN, Bld         39     Calcium         8.0     Chloride         88     CO2         30     Creatinine         5.3     Differential Method         Automated     eGFR if          9.6     eGFR if non           8.3  Comment:  Calculation used to obtain the estimated glomerular filtration  rate (eGFR) is the CKD-EPI equation.        Eos #         0.1     Eosinophil%         1.0     Estimated Avg Glucose         128     Glucose         113     Gran # (ANC)         8.6     Gran%         77.4     Hematocrit         26.9     Hemoglobin         7.9     Hemoglobin A1C External         6.1  Comment:  ADA Screening Guidelines:  5.7-6.4%  Consistent with prediabetes  >or=6.5%  Consistent with diabetes  High levels of fetal hemoglobin interfere with the HbA1C  assay. Heterozygous hemoglobin variants (HbS, HgC, etc)do  not significantly interfere with this assay.   However, presence of multiple variants may affect accuracy.       Immature Grans (Abs)         0.20  Comment:  Mild elevation in immature granulocytes is non specific and   can be seen in a variety of conditions including stress response,   acute inflammation, trauma and pregnancy. Correlation with other   laboratory and clinical findings is essential.       Immature Granulocytes         1.8     Coumadin Monitoring INR     1.4  Comment:  Coumadin Therapy:  2.0 - 3.0 for INR for all indicators except mechanical heart valves  and antiphospholipid syndromes which should use 2.5 - 3.5.     3.4  Comment:  Coumadin Therapy:  2.0 - 3.0 for INR for all indicators except mechanical heart valves  and antiphospholipid syndromes which should use 2.5 - 3.5.       Lymph #         0.8     Lymph%         7.5     Magnesium         2.0     MCH         26.2     MCHC         29.4     MCV         89     Mono #         1.3     Mono%         12.0     MPV         10.6     nRBC         2     Phosphorus         5.1     Platelets         313     POCT Glucose 324 182   99       Potassium         3.9     PROTEIN TOTAL         6.5     Protime     14.2   32.7     RBC         3.01     RDW         21.5     Sodium         135     TSH         3.336     WBC         11.04                        All pertinent  labs from the last 24 hours have been reviewed.    Significant Diagnostics:  All pertinent imaging reviewed.

## 2019-07-04 NOTE — PLAN OF CARE
Problem: Physical Therapy Goal  Goal: Physical Therapy Goal  Goals to be met by: 2019     Patient will increase functional independence with mobility by performin. Supine to sit with Modified [place]  2. Sit to supine with supervision  3. Sit to stand transfer with Contact Guard Assistance with RW and maintaining any weight bearing precautions  4. Bed to chair transfer with Contact Guard Assistance using Rolling Walker and maintaining any weight bearing precautions  5. Gait  x 30 feet with Contact Guard Assistance using Rolling Walker. and maintaining any weight bearing precautions  6. Lower extremity exercise program x20 reps per handout, with assistance as needed    PT ayanna completed. Megan Licona, PT 2019

## 2019-07-04 NOTE — HOSPITAL COURSE
7/3: admit to [facility]  7/4: started DVT ppx per neurosurg recs. Patient will receive short dialysis course today (without/minimal fluid removal)   7/5: NAEON. Step down

## 2019-07-04 NOTE — PT/OT/SLP EVAL
Speech Language Pathology Evaluation  Cognitive/Bedside Swallow    Patient Name:  Sisi Medel   MRN:  [id number]  Admitting Diagnosis: <principal problem not specified>    Recommendations:                  General Recommendations:  Cognitive-linguistic therapy  Diet recommendations:  Regular, Thin   Aspiration Precautions: HOB to 90 degrees, Remain upright 30 minutes post meal and Standard aspiration precautions   General Precautions: Standard, fall  Communication strategies:  none    History:     Past Medical History:   Diagnosis Date    Anemia in ESRD (end-stage renal disease) 3/7/2016    Anticoagulant long-term use     Cervical radiculopathy 7/20/2015    CHF (congestive heart failure)     Chronic combined systolic and diastolic heart failure 6/14/2013    EF 20% 2013, improved with Medical therapy, negative PET 2013    Chronic respiratory failure with hypoxia 04/22/2013    On home oxygen at 2-5 liters    Closed fracture of proximal end of right fibula 6/27/2016    Complications due to renal dialysis device, implant, and graft     DDD (degenerative disc disease), lumbar 6/17/2015    Dependence on renal dialysis     Diabetes mellitus type II, controlled 12/8/2017    ESRD on hemodialysis 2/23/2016    Essential hypertension     Gout     Lateral meniscal tear 5/31/2016    Lumbar stenosis 6/17/2015    Obesity, Class III, BMI 40-49.9 (morbid obesity)     Pacemaker     Paroxysmal atrial fibrillation 5/16/2014    Not on anticoagulation    Peripheral neuropathy 11/13/2013    Personal history of gastric ulcer 3/19/2013    Pneumonia of left lower lobe due to infectious organism 3/10/2019    Sarcoidosis, lung 2009    Diagnosed in 2009 with ocular manifestation, on 4L home O2 and PO steroids    Secondary pulmonary hypertension 4/7/2015    Seizure disorder 3/19/2013    Shingles 11/13/2013    Thyroid disease        Past Surgical History:   Procedure Laterality Date    ABDOMINAL SURGERY       ABLATION N/A 2017    Performed by [facility] at [facility] CATH LAB    ANGIOPLASTY Left 1/10/2018    Performed by Torrey Villafana MD at [facility] OR 2ND FLR    ANGIOPLASTY-PERCUTANEOUS TRANSLUMINAL (PTA) Left 2017    Performed by [facility] at [facility] OR 2ND FLR    ANGIOPLASTY-PERCUTANEOUS TRANSLUMINAL (PTA) Left 10/12/2016    Performed by [facility] at [facility] OR 2ND FLR    CARDIAC PACEMAKER PLACEMENT       SECTION      x2    DECLOT GRAFT PERCUTANEOUS Left 2017    Performed by [facility] MD at [facility] OR 2ND FLR    DECLOT-GRAFT Left 2016    Performed by Harjit Starr MD at [facility] CATH LAB    DECLOT-GRAFT Left 2016    Performed by [facility] MD at [facility] CATH LAB    ECHOCARDIOGRAM-TRANSESOPHAGEAL N/A 2013    Performed by Delmy Surgeon at [facility] DELMY    Fistulogram Left 2019    Performed by [facility] at [facility] CATH LAB    fistulogram Left 1/10/2018    Performed by [facility] MD at [facility] OR 2ND FLR    FISTULOGRAM Left 2017    Performed by [facility] MD at [facility] CATH LAB    FISTULOGRAM Left 10/12/2016    Performed by Tor[facility] MD at [facility] OR 2ND FLR    FISTULOGRAM Left 2016    Performed by Harjit Starr MD at [facility] CATH LAB    Fistulogram, LUE AVG, possible intervention Left 2019    Performed by Tor[facility]ana MD at [facility] CATH LAB    Fistulogram, OR 11 Left 2019    Performed by [facility] MD at [facility] OR 2ND FLR    INJECTION-STEROID-EPIDURAL-TRANSFORAMINAL Right 2015    Performed by [facility] at [facility] PAIN MGT    INJECTION-STEROID-EPIDURAL-TRANSFORAMINAL Right 2015    Performed by [facility]ria [facility]rez MD at [facility] [place] MGT    [id number] / Left upper AV graft. Left 2/3/2016    Performed by [facility] at [facility] OR 2ND FLR    [id number]-[id number] N/A 2017    Performed by Bladimir Adorno MD at [facility] CATH LAB     OTHER SURGICAL HISTORY      loop recorder implant    PLACEMENT-STENT Left 2/7/2017    Performed by [facility] at [facility] OR 2ND FLR    PTA, AV FISTULA N/A 1/30/2019    Performed by [facility] at [facility] CATH LAB    stent to fistula      TRANSESOPHAGEAL ECHOCARDIOGRAM (JARAD) N/A 6/27/2016    Performed by Sourav Machuca MD at [facility] CATH LAB    ULTRASOUND, UPPER GI TRACT, ENDOSCOPIC N/A 1/9/2014    Performed by Stewart Burgess MD at [facility] ENDO (2ND FLR)    VASCULAR SURGERY      fistula to L upper arm        Chest X-Rays: 6/6/19-Pacemaker identified as before.  Mild cardiomegaly and mild edema.  The pulmonary status improved as compared to the previous study.  No significant airspace consolidation or pleural effusion.     Prior diet: regular/thin    Subjective     Spoke with RN prior to entering pt's room . Pt seen bedside for session. Alert and agreeable to ST.     Pain/Comfort:  Pain Rating 1: 0/10  Pain Rating Post-Intervention 1: 0/10    Objective:     Cognitive Status:    Arousal/Alertness Appropriate response to stimuli  Orientation Oriented x4  Memory Delayed required binary choice/max cues to recall 3/3 unassociated words after 3 minute delay  Managing finances fxnl math word problems compelted with 25% acc; pt reports she manages own finisnce at home  Reasoning Deductive reasoning category exclusion- 0% acc      Receptive Language:   Comprehension:      WFL    Pragmatics:    WFL; pleasant and appropriate    Expressive Language:  Verbal:    Naming Confrontation 100% and Divergent responsive named 7 concrete category members within one minute    Motor Speech:  WFL    Voice:   WFL    Visual-Spatial:  tba    Reading:   tba     Written Expression:   tba'    Oral Musculature Evaluation  Oral Musculature: WFL  Dentition: present and adequate  Mucosal Quality: good  Oral Labial Strength and Mobility: WFL  Lingual Strength and Mobility: [facility]  Volitional Cough: intact  Volitional Swallow:  intact  Voice Prior to PO Intake: strong and clear    Bedside Swallow Eval:   Consistencies Assessed:  · Thin liquids 4 oz water via single and consecutive straw sips  · Puree pudding via tsp x3  · Solids chicken, alia cracker     Oral Phase:   · WFL    Pharyngeal Phase:   · no overt clinical signs/symptoms of aspiration  · no overt clinical signs/symptoms of pharyngeal dysphagia    Compensatory Strategies  · None    Treatment: Provided education to pt re: role of ST. POC, recommendations to continue regular consistency diet with thin liquids, and plan to f/u to address cognitive-linguistic deficits. She verbalized understanding. White board updated. RN notified of results and recs.       Assessment:     Sisi Medel is a 58 y.o. female with an SLP diagnosis of Cognitive-Linguistic Impairment.      Goals:   Multidisciplinary Problems     SLP Goals        Problem: SLP Goal    Goal Priority Disciplines Outcome   SLP Goal     SLP Ongoing (interventions implemented as appropriate)   Description:  Goals expected to be met by 7/11:   1. Pt will name >10 items in concrete category within one minute IND.  2. Pt will complete mental manipulation of 4 word sets with 90% acc given min cues.  3. Pt will complete functional math word problems (time/money) with 80% acc given min cues.  4. Pt will utilize memory strategies to recall 3 units of novel information after 3-5 minute delay with min cues.    5. Pt will participate in further assessment of reading, writing, and cognition.                     Plan:     · Patient to be seen:  2 x/week   · Plan of Care expires:  08/02/19  · Plan of Care reviewed with:  patient   · SLP Follow-Up:  Yes       Discharge recommendations:      Barriers to Discharge:  Level of Skilled Assistance Needed .    Time Tracking:     SLP Treatment Date:   07/04/19  Speech Start Time:  1118  Speech Stop Time:  1139     Speech Total Time (min):  21 min    Billable Minutes: Eval 12 minutes  and  Eval Swallow and Oral Function 9 minutes    Rhea Daly CCC-SLP  07/04/2019

## 2019-07-04 NOTE — ASSESSMENT & PLAN NOTE
Sisi Medel is a 58 y.o. female with Right plantar heel wound, calluses on b/l halluxes with partial thickness ulcer on left., stable, no clinical signs of infection.    -Wounds dressed with iodosorb and aquacel boarder dressing, cast padding and coban, 4x4's, kerlix, and ace.  Nursing to do dressing changes with medihoney and aquacel boarders.  -ordered venous ultrasound to rule out DVT  -x-ray showing b/l 4th toe fractures, non displaced, will treat conservatively with Weight bearing in darco shoe.    Calluses were trimmed, and dead skin removed.  No surgical intervention indicated at this time.  Ordered lotion to be applied to feet daily, nursing to do dressing changes.  Podiatry will sign off.    Weight bearing status:  WBAT in protective shoe  Offloading device: Darco shoe    DC Instructions:  Patient is to follow up with podiatry within 2 weeks of discharge.  Call [phone].  Home health to do dressing changes every week by applying medihoney and protective football as follows: apply medihoney to wounds, dress with 4x4's, cast padding, and coban

## 2019-07-04 NOTE — PT/OT/SLP EVAL
"Occupational Therapy   Evaluation    Name: Sisi Medel  MRN: [id number]  Admitting Diagnosis:  SDH ( Subdural Hematoma)    Recommendations:     Discharge Recommendations: nursing facility, skilled  Discharge Equipment Recommendations:  Drop arm bedside commode and shower chair  Barriers to discharge:  (Fall risk)    Assessment:     Sisi Medel is a 58 y.o. female with a medical diagnosis of subdural hematoma 2/2 rescnet fall out of wheelchair on the way to podiatry appoinment. Pt recently discharge from OSNF ( with NWB status of RLE).  She presents alert, oriented x4, and willing to participating in therapy evaluation.  Performance deficits affecting function: impaired endurance, weakness, gait instability, impaired balance, impaired self care skills, impaired functional mobilty, decreased safety awareness, impaired skin, impaired cardiopulmonary response to activity. Pt demo'd poor adherence to WB pxs despite max cues provided. Pt would benefit from SNF following d/c to continue to progress towards goals and improve quality of life.       Rehab Prognosis: Good; patient would benefit from acute skilled OT services to address these deficits and reach maximum level of function.       Plan:     Patient to be seen 4 x/week to address the above listed problems via self-care/home management, therapeutic activities, therapeutic exercises, neuromuscular re-education  · Plan of Care Expires: 08/02/19  · Plan of Care Reviewed with: patient    Subjective     Chief Complaint: BLE pain, R shoulder pain, being hungry   Patient/Family Comments/goals: Return to PLOF    Occupational Profile:  Living Environment: Pt lives with significant other, 1SH with 5STE ( [facility]) however has ramp acces ( pt reports she needs to be reworked 2/2 being " too slippery") ; bathroom contains walk-in shower  Previous level of function: PTA, pt reports needing assistance self care tasks and functional transfers ( mostly 2/2 WB " status)  Roles and Routines: Mostly home dweller, does not work or drive  Equipment Used at Home:  bedside commode, oxygen, wheelchair, rollator  Assistance upon Discharge: Significant other able to assist    Pain/Comfort:  · Pain Rating 1: (BLE pain and R shoulder pain ( 8/10 for legs))    Patients cultural, spiritual, Baptism conflicts given the current situation: no    Objective:     Communicated with: RN prior to session.  Patient found supine with oxygen, telemetry, blood pressure cuff, peripheral IV, PICC line, bed alarm upon OT entry to room.    General Precautions: Standard, fall   Orthopedic Precautions:RLE non weight bearing(from rescent SNF admission; will need to confrm with podiarty during this admit)   Braces:       Occupational Performance:    Bed Mobility:    · Patient completed Rolling/Turning to Left with  stand by assistance  · Patient completed Supine to Sit with stand by assistance and with side rail  · Patient completed Sit to Supine with minimum assistance    * Darco boots donned prior to standing    Functional Mobility/Transfers:  · Patient completed Sit <> Stand Transfer with minimum assistance  with  rolling walker ( pt not following RLE WB pxs despite cues provided)  · Functional Mobility: pt took ~5 lateral steps at bedside requiring CGA-min A with RW ( continues to not maintain WB pxs)    Activities of Daily Living:  · Feeding:  independence to feed self while supine  · Upper Body Dressing: minimum assistance to callie gown like jacket while seated EOB  · Lower Body Dressing: maximal assistance to callie B socks while seated EOB    Cognitive/Visual Perceptual:  Cognitive/Psychosocial Skills:     -       Oriented to: Person, Place, Time and Situation   -       Follows Commands/attention:Easily distracted and Follows multistep  commands  -       Communication: clear/fluent  -       Safety awareness/insight to disability: impaired   -       Mood/Affect/Coping skills/emotional control:  Appropriate to situation  Visual/Perceptual:      -Intact     Physical Exam:  Postural examination/scapula alignment:    -       Rounded shoulders  Skin integrity: Visible skin intact  Edema:  None noted  Dominant hand:    -       RUE  Upper Extremity Range of Motion:     -       Right Upper Extremity: WFL  -       Left Upper Extremity: WFL  Upper Extremity Strength:    -       Right Upper Extremity: WFL 4+/5  -       Left Upper Extremity: WFL  4+/5   Strength:    -       Right Upper Extremity: WFL  -       Left Upper Extremity: WFL  Fine Motor Coordination:    -       Intact     AMPAC 6 Click ADL:  AMPAC Total Score: 16    Treatment & Education:  -Pt edu on OT role/POC, no family present for education  -Importance of OOB activity with staff assistance ( UIC throughout the day)  -Safety during functional t/f and mobility  -White board updated  - Multiple self care tasks completed-- as noted above  - All questions/concerns answered within OT scope of practice  - Edu pt on WB status at this time; pt verbalized understanding however did not demo understanding   Education:    Patient left HOB elevated with all lines intact, call button in reach and RN notified    GOALS:   Multidisciplinary Problems     Occupational Therapy Goals        Problem: Occupational Therapy Goal    Goal Priority Disciplines Outcome Interventions   Occupational Therapy Goal     OT, PT/OT Ongoing (interventions implemented as appropriate)    Description:  Goals to be met by: 7/14/19     Patient will increase functional independence with ADLs by performing:    UE Dressing with Stand-by Assistance.  LE Dressing with Minimal Assistance.  Grooming while seated with Supervision.  Toileting from bedside commode with Minimal Assistance for hygiene and clothing management.   Supine to sit with Supervision.  Toilet transfer to bedside commode with Contact Guard Assistance while adhering to WB pxs.                       History:     Past Medical  History:   Diagnosis Date    Anemia in ESRD (end-stage renal disease) 3/7/2016    Anticoagulant long-term use     Cervical radiculopathy 2015    CHF (congestive heart failure)     Chronic combined systolic and diastolic heart failure 2013    EF 20% , improved with Medical therapy, negative PET     Chronic respiratory failure with hypoxia 2013    On home oxygen at 2-5 liters    Closed fracture of proximal end of right fibula 2016    Complications due to renal dialysis device, implant, and graft     DDD (degenerative disc disease), lumbar 2015    Dependence on renal dialysis     Diabetes mellitus type II, controlled 2017    ESRD on hemodialysis 2016    Essential hypertension     Gout     Lateral meniscal tear 2016    Lumbar stenosis 2015    Obesity, Class III, BMI 40-49.9 (morbid obesity)     Pacemaker     Paroxysmal atrial fibrillation 2014    Not on anticoagulation    Peripheral neuropathy 2013    Personal history of gastric ulcer 3/19/2013    Pneumonia of left lower lobe due to infectious organism 3/10/2019    Sarcoidosis, lung 2009    Diagnosed in  with ocular manifestation, on 4L home O2 and PO steroids    Secondary pulmonary hypertension 2015    Seizure disorder 3/19/2013    Shingles 2013    Thyroid disease        Past Surgical History:   Procedure Laterality Date    ABDOMINAL SURGERY      ABLATION N/A 2017    [facility] CATH LAB    ANGIOPLASTY Left 1/10/2018    Performed by [facility] MD at [facility] OR 2ND FLR    ANGIOPLASTY-PERCUTANEOUS TRANSLUMINAL (PTA) Left 2017    Performed by [facility] at [facility] OR 2ND FLR    ANGIOPLASTY-PERCUTANEOUS TRANSLUMINAL (PTA) Left 10/12/2016    Performed by [facility] at [facility] OR 2ND FLR    CARDIAC PACEMAKER PLACEMENT       SECTION      x2    DECLOT GRAFT PERCUTANEOUS Left 2017     Performed by [facility] at [facility] OR 2ND FLR    DECLOT-GRAFT Left 6/21/2016    Performed by Harjit Starr MD at [facility] CATH LAB    DECLOT-GRAFT Left 6/20/2016    Performed by [facility]jit Starr MD at [facility] CATH LAB    ECHOCARDIOGRAM-TRANSESOPHAGEAL N/A 6/27/2013    Performed by Delmy Surgeon at [facility] DELMY    Fistulogram Left 4/8/2019    Performed by [facility] MD at [facility] CATH LAB    fistulogram Left 1/10/2018    Performed by Torrey Villafana MD at [facility] OR 2ND FLR    FISTULOGRAM Left 9/13/2017    Performed by Tor[facility] Vill[facility] MD at [facility] CATH LAB    FISTULOGRAM Left 10/12/2016    Performed by Torrey Vill[facility]ana MD at [facility] OR 2ND FLR    FISTULOGRAM Left 6/21/2016    Performed by Harjit Starr MD at [facility] CATH LAB    Fistulogram, LUE AVG, possible intervention Left 1/30/2019    Performed by Tor[facility] Vill[facility]ana MD at [facility] CATH LAB    Fistulogram, OR 11 Left 5/8/2019    Performed by Torrey Vill[facility]ana MD at [facility] OR 2ND FLR    INJECTION-STEROID-EPIDURAL-TRANSFORAMINAL Right 7/20/2015    Performed by [facility] at [facility] PAIN MGT    INJECTION-STEROID-EPIDURAL-TRANSFORAMINAL Right 5/21/2015    Performed by Pat[facility] MD at [facility] PAIN MGT    [id number] / Left upper AV graft. Left 2/3/2016    Performed by [facility] MD at [facility] OR 2ND FLR    [id number]-[id number] N/A 1/11/2017    Performed by Bladimir Adorno MD at [facility] CATH LAB    OTHER SURGICAL HISTORY      loop recorder implant    PLACEMENT-STENT Left 2/7/2017    Performed by Tor[facility] MD at [facility] OR 2ND FLR    PTA, AV FISTULA N/A 1/30/2019    Performed by Tor[facility] MD at [facility] CATH LAB    stent to fistula      TRANSESOPHAGEAL ECHOCARDIOGRAM (JARAD) N/A 6/27/2016    Performed by Sourav Machuca MD at [facility] CATH LAB    ULTRASOUND, UPPER GI TRACT, ENDOSCOPIC N/A 1/9/2014    Performed by Stewart Burgess MD at [facility] ENDO (2ND FLR)    VASCULAR SURGERY       fistula to L upper arm        Time Tracking:     OT Date of Treatment: 07/04/19  OT Start Time: 0851  OT Stop Time: 0917  OT Total Time (min): 26 min    Billable Minutes:Evaluation 26    Karen Song OT  7/4/2019

## 2019-07-04 NOTE — ASSESSMENT & PLAN NOTE
--  Continue Neuro checks q 1hr  --  Neurosurgery consulted-no surg intevention  -- CT Head reveals--Interval development of a thin hyperdense extra-axial collection overlying the right tentorium cerebella most compatible with acute subdural hemorrhage.  No significant mass effect or midline shift.  --  repeat CTH stable  --  Pt was on Coumadin and Plavix at home, INR on admit 3.4, holding AC  --  follow PT INR daily  --PCC, DDAVP and vit. K received  --  PT/OT/SLP  --  SBP goal <140, MAP >60

## 2019-07-04 NOTE — PLAN OF CARE
Problem: Occupational Therapy Goal  Goal: Occupational Therapy Goal  Goals to be met by: 7/14/19     Patient will increase functional independence with ADLs by performing:    UE Dressing with Stand-by Assistance.  LE Dressing with Minimal Assistance.  Grooming while seated with Supervision.  Toileting from bedside commode with Minimal Assistance for hygiene and clothing management.   Supine to sit with Supervision.  Toilet transfer to bedside commode with Contact Guard Assistance while adhering to WB pxs.     Outcome: Ongoing (interventions implemented as appropriate)  Evaluation completed. Initiate POC.   Karen Song OT  7/4/2019
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
## 2019-07-05 NOTE — PROGRESS NOTES
HD completed x 3hrs with net uf of 2L, blood rinse dback. Needles pulled out w/o issues. Kept patient rested and comfortable. VSS WNL.      Given report to primary nurse

## 2019-07-05 NOTE — PLAN OF CARE
Problem: Adult Inpatient Plan of Care  Goal: Plan of Care Review  POC reviewed with pt at 0530. Pt verbalized understanding. Questions and concerns addressed. No acute events overnight. Pt progressing toward goals. Will continue to monitor. See flowsheets for full assessment and VS info.    - Complaints of BLE pain; oxycodone administered  - Ultrasound of BLE showed no signs of DVT  - 2L removed via hemodialysis  - Complete bath/linen change  - CT planned for today/tonight; no order yet

## 2019-07-05 NOTE — PLAN OF CARE
Problem: Physical Therapy Goal  Goal: Physical Therapy Goal  Goals to be met by: 2019     Patient will increase functional independence with mobility by performin. Supine to sit with Modified [place]  2. Sit to supine with supervision  3. Sit to stand transfer with Contact Guard Assistance with RW and maintaining any weight bearing precautions- Met   Sit to stand transfer with supervision assistance with RW from bedside chair.  4. Bed to chair transfer with Contact Guard Assistance using Rolling Walker and maintaining any weight bearing precautions.  4B. Bed to w/c transfer using sliding board and maintaining any weight bearing precautions w/ min assist  5. Gait  x 30 feet with Contact Guard Assistance using Rolling Walker. and maintaining any weight bearing precautions  6. Lower extremity exercise program x20 reps per handout, with assistance as needed      Outcome: Ongoing (interventions implemented as appropriate)  Continue with plan of care.   Yolanda Arora, PT  2019

## 2019-07-05 NOTE — SUBJECTIVE & OBJECTIVE
Subjective:     Interval History: NAEON    Current Neurological Medications:     Current Facility-Administered Medications   Medication Dose Route Frequency Provider Last Rate Last Dose    0.9%  NaCl infusion   Intravenous PRN Grant Gomes MD        acetaminophen tablet 650 mg  650 mg Oral Q4H PRN Eugenia Miinea, NP   650 mg at 07/04/19 2147    ammonium lactate 12 % lotion   Topical (Top) BID PRN Emeka Diaz MD        atorvastatin tablet 80 mg  80 mg Oral QHS Eugenia Miinea, NP   80 mg at 07/04/19 2136    dextrose 10% (D10W) Bolus  12.5 g Intravenous PRN Eugenia Miinea, NP        fludrocortisone tablet 200 mcg  200 mcg Oral BID Eugenia Miinea, NP   200 mcg at 07/05/19 0851    glucagon (human recombinant) injection 1 mg  1 mg Intramuscular PRN Eugenia Miinea, NP        heparin (porcine) injection 5,000 Units  5,000 Units Subcutaneous Q8H Mindy Larios MD   5,000 Units at 07/05/19 0518    insulin aspart U-100 pen 1-10 Units  1-10 Units Subcutaneous Q6H PRN Eugenia Miinea, NP   2 Units at 07/05/19 0521    levETIRAcetam tablet 500 mg  500 mg Oral BID Eugenia Miinea, NP   500 mg at 07/05/19 0851    midodrine tablet 10 mg  10 mg Oral TID Eugenia Miinea, NP   10 mg at 07/05/19 0851    ondansetron disintegrating tablet 8 mg  8 mg Oral Q8H PRN Eugenia Miinea, NP        predniSONE tablet 20 mg  20 mg Oral Daily Eugenia Miinea, NP   20 mg at 07/05/19 0851       Review of Systems   Constitutional: Negative.    HENT: Negative.    Eyes: Negative.    Respiratory:        On home oxygen / O2 via nasal cannula   Cardiovascular: Negative.    Gastrointestinal: Negative.    Endocrine: Negative.    Genitourinary: Negative.    Musculoskeletal: Negative.    Allergic/Immunologic: Negative.    Neurological: Negative.    Psychiatric/Behavioral: Negative.      Objective:     Vital Signs (Most Recent):  Temp: 97.5 °F (36.4 °C) (07/05/19 0700)  Pulse: 69 (07/05/19 1000)  Resp: (!) 47 (07/05/19 1000)  BP: 110/81 (07/05/19  1000)  SpO2: (!) 91 % (07/05/19 1000) Vital Signs (24h Range):  Temp:  [97.5 °F (36.4 °C)-97.9 °F (36.6 °C)] 97.5 °F (36.4 °C)  Pulse:  [69-71] 69  Resp:  [13-47] 47  SpO2:  [86 %-100 %] 91 %  BP: ()/(59-86) 110/81     Weight: 110 kg (242 lb 8.1 oz)  Body mass index is 36.87 kg/m².    Physical Exam   Constitutional: She is oriented to person, place, and time. She appears well-developed and well-nourished.   HENT:   Head: Normocephalic.   Eyes: Pupils are equal, round, and reactive to light. Conjunctivae and EOM are normal. Right eye exhibits no discharge. Left eye exhibits no discharge. No scleral icterus.   Cardiovascular: Normal rate and regular rhythm.   Pulmonary/Chest: Effort normal.   On oxygen via nasal cannula     Abdominal: Soft.   Musculoskeletal: Normal range of motion.   Neurological: She is alert and oriented to person, place, and time. She displays normal reflexes. No cranial nerve deficit or sensory deficit. She exhibits normal muscle tone. Coordination normal.   Reflex Scores:       Tricep reflexes are 2+ on the right side and 2+ on the left side.       Bicep reflexes are 2+ on the right side and 2+ on the left side.       Brachioradialis reflexes are 2+ on the right side and 2+ on the left side.       Patellar reflexes are 2+ on the right side and 2+ on the left side.       Achilles reflexes are 2+ on the right side and 2+ on the left side.  Psychiatric: She has a normal mood and affect. Her behavior is normal. Judgment and thought content normal.       NEUROLOGICAL EXAMINATION:     MENTAL STATUS   Oriented to person, place, and time.   Attention: normal.   Level of consciousness: alert    CRANIAL NERVES   Cranial nerves II through XII intact.     CN III, IV, VI   Pupils are equal, round, and reactive to light.  Extraocular motions are normal.     MOTOR EXAM   Muscle bulk: normal  Overall muscle tone: normal  Right arm tone: normal  Left arm tone: normal  Right arm pronator drift: absent  Left  arm pronator drift: absent  Right leg tone: normal  Left leg tone: normal    Strength   Right neck flexion: 5/5  Left neck flexion: 5/5  Right neck extension: 5/5  Left neck extension: 5/5  Right deltoid: 5/5  Left deltoid: 5/5  Right biceps: 5/5  Left biceps: 5/5  Right triceps: 5/5  Left triceps: 5/5  Right wrist flexion: 5/5  Left wrist flexion: 5/5  Right wrist extension: 5/5  Left wrist extension: 5/5  Right interossei: 5/5  Left interossei: 5/5  Right abdominals: 5/5  Left abdominals: 5/5  Right iliopsoas: 5/5  Left iliopsoas: 55  Right quadriceps: 55  Left quadriceps: 5/5  Right hamstrin5  Left hamstrin/5  Right glutei: 5  Left glutei:   Right anterior tibial:   Left anterior tibial:   Right posterior tibial:   Left posterior tibial: 5  Right peroneal: 55  Left peroneal: 55  Right gastroc: 5/5  Left gastroc: 5/5    REFLEXES     Reflexes   Right brachioradialis: 2+  Left brachioradialis: 2+  Right biceps: 2+  Left biceps: 2+  Right triceps: 2+  Left triceps: 2+  Right patellar: 2+  Left patellar: 2+  Right achilles: 2+  Left achilles: 2+  Right plantar: normal  Left plantar: normal    SENSORY EXAM   Light touch normal.       Significant Labs: All pertinent lab results from the past 24 hours have been reviewed.    Significant Imaging: I have reviewed all pertinent imaging results/findings within the past 24 hours.  I have reviewed and interpreted all pertinent imaging results/findings within the past 24 hours.

## 2019-07-05 NOTE — ASSESSMENT & PLAN NOTE
58 F with pulmonary sarcoidosis on 3-5L home oxygen, Class 3 HFrEF (3/2019 EF 35%), perm Afib/flutter s/p 6/2017 AVN ablation & PPM, ESRD (TTS HD via AVF, unknown baseline Cr), Type 2 DM (3/2019 A1c 6.2%), pulmonary HTN, YOANDY, and orthostatic hypotension that presented to ED s/p fall from wheelchair with acute R SDH. Given K-Centra in ED to reverse AC/APT.    - Neurologically stable  - No acute neurosurgical intervention necessary at this time  - Repeat CT head stable, slight increase subdural collection however no significant mass effect or midline shift  - Ok to start SQH for dvt prophylaxis.   - Repeat CT head Monday 7/8, if stable ok to resume anticoagulation.  - Neurochecks q1h  - HOB >30  - SBP <150; cardene prn  - Na 140-150   - Keppra 500 BID x2 weeks  - Remainder of medical management per primary team   - Will continue to monitor closely, please page with questions/concerns

## 2019-07-05 NOTE — PROGRESS NOTES
Nursing Transfer Note       Transfer [id number]     Transfer via bed    Transfer with meds belongings and chart    Transported by Shelton RENTERIA    Medicines sent: insulin and foot cream    Chart sent with patient: yes    Notified: receiving RN Rahat    Bedside Neuro assessment performed: yes    Bedside Handoff given to: Rahat RN     Upon arrival to floor: pt tolerated transport well, transfer of care complete

## 2019-07-05 NOTE — PROGRESS NOTES
HD NOTES    Received report from Ninfa RENTERIA. Patient with ongoing 2hrs remaining HD treatment. Access: Left AVG. UF at 2L and will adjust according to patient's V/S    For continuity of care.

## 2019-07-05 NOTE — SUBJECTIVE & OBJECTIVE
Interval History: naeon    Medications:  Continuous Infusions:  Scheduled Meds:   atorvastatin  80 mg Oral QHS    fludrocortisone  200 mcg Oral BID    heparin (porcine)  5,000 Units Subcutaneous Q8H    levETIRAcetam  500 mg Oral BID    midodrine  10 mg Oral TID    predniSONE  20 mg Oral Daily     PRN Meds:sodium chloride 0.9%, acetaminophen, ammonium lactate, Dextrose 10% Bolus, glucagon (human recombinant), insulin aspart U-100, ondansetron     Review of Systems  Objective:     Weight: 110 kg (242 lb 8.1 oz)  Body mass index is 36.87 kg/m².  Vital Signs (Most Recent):  Temp: 97.8 °F (36.6 °C) (07/05/19 0305)  Pulse: 69 (07/05/19 0605)  Resp: (!) 27 (07/05/19 0605)  BP: 108/67 (07/05/19 0605)  SpO2: 99 % (07/05/19 0605) Vital Signs (24h Range):  Temp:  [97.6 °F (36.4 °C)-97.9 °F (36.6 °C)] 97.8 °F (36.6 °C)  Pulse:  [69-71] 69  Resp:  [13-45] 27  SpO2:  [86 %-100 %] 99 %  BP: ()/(59-86) 108/67                          Hemodialysis AV Graft Left upper arm (Active)   Needle Size 15ga 7/4/2019  6:15 PM   Site Assessment Clean;Dry;Intact 7/4/2019  6:15 PM   Patency Present;Thrill;Bruit 7/4/2019  6:15 PM   Status Accessed 7/4/2019  6:15 PM   Flows Good 7/4/2019  6:15 PM       Neurosurgery Physical Exam   AOX3, speech fluent  PERRL, EOMI, face symm, tongue midline  EDOUARD symm  SILT  No drift    Significant Labs:  Recent Labs   Lab 07/03/19  1127 07/04/19  0413 07/05/19  0530   * 158* 136*   * 136 138   K 3.9 4.1 5.7*   CL 88* 93* 101   CO2 30* 25 23   BUN 39* 48* 30*   CREATININE 5.3* 6.4* 4.7*   CALCIUM 8.0* 7.7* 8.4*   MG 2.0 2.0 2.1     Recent Labs   Lab 07/03/19  1127 07/04/19  0125 07/05/19  0149   WBC 11.04 17.61* 16.79*   HGB 7.9* 7.6* 7.2*   HCT 26.9* 25.0* 25.5*    354* 290     Recent Labs   Lab 07/03/19  1549 07/04/19  0248 07/05/19  0149   INR 1.4* 1.3* 1.2     Microbiology Results (last 7 days)     ** No results found for the last 168 hours. **            Significant  Diagnostics:

## 2019-07-05 NOTE — PT/OT/SLP PROGRESS
Occupational Therapy   Treatment/POC change    Name: Sisi Medel  MRN: [id number]  Admitting Diagnosis: SDH    Recommendations:     Discharge Recommendations:HHOT  Discharge Equipment Recommendations:  (Bariatric BSC, bath bench)  Barriers to discharge:  (Fall risk)    Assessment:     Sisi Medel is a 58 y.o. female with a medical diagnosis of SDH.  She presents alert and willing to participate in therapy session. Upon arrival, pt present bright affect, supine in bed with no family family in room.  Performance deficits affecting function are weakness, impaired endurance, impaired self care skills, impaired functional mobilty, impaired balance, decreased lower extremity function, impaired skin, orthopedic precautions, gait instability. Per recent podiatry note ( 7/04), pt WBAT for BLE's at this. She tolerated treatment session well and progressing with goals. She would benefit from  HHOT following d/c to continue to progress towards goals and ensure safe transition to home environment.     Rehab Prognosis:  Good; patient would benefit from acute skilled OT services to address these deficits and reach maximum level of function.       Plan:     Patient to be seen 4 x/week to address the above listed problems via self-care/home management, therapeutic activities, therapeutic exercises, neuromuscular re-education  · Plan of Care Expires: 08/02/19  · Plan of Care Reviewed with: patient    Subjective     Pain/Comfort:  · Pain Rating 1: 0/10  · Pain Rating Post-Intervention 1: 0/10    Objective:     Communicated with: RN prior to session.  Patient found supine with blood pressure cuff, bed alarm, peripheral IV, telemetry, pulse ox (continuous), oxygen upon OT entry to room.    General Precautions: Standard, fall   Orthopedic Precautions:(WBAT RLE ( B darco shoes in room))   Braces:       Occupational Performance:     Bed Mobility:    · Patient completed Rolling/Turning to Left with  stand by  assistance  · Patient completed Supine to Sit with stand by assistance     Functional Mobility/Transfers:  · Patient completed Sit <> Stand Transfer with minimum assistance  with  rolling walker ( From EOB); moderate assistance from bedside chair  · Patient completed Bed <> Chair Transfer using Stand Pivot technique with contact guard assistance with rolling walker  · Functional Mobility: Pt completed functional mobility from EOB < chair with CGA and RW for balance and safety. Pt complaints of dizziness during mobility however subsides once seated in chair. All vital signs stable.     Activities of Daily Living:  · Upper Body Dressing: stand by assistance to callie gown like jacket while seated EOB  · Lower Body Dressing: maximal assistance to callie Darco shoes while seated EOB  * declined further participation in self care tasks    [facility] 6 Click ADL: 17    Treatment & Education:  -Pt edu on OT role/POC- no family present for education  -Importance of OOB activity with staff assistance ( UIC for each meal)  -Safety during functional t/f and mobility ( RW management, proper hand placement)  -White board updated  - Multiple dressing tasks performed--as noted above  - All questions/concerns answered within OT scope of practice  - Edu pt on WB px for RLE ( WBAT)-- pt verbalized understanding  - Pt completed BUE AROM exercises while seated UIC including: 1x15 reps in shoulder flexion, elbow flexion/extension, chest press, alternating punches, and scap squeezes. Pt tolerated well with min-moderate rest breaks between each exercise.     Patient left up in chair with all lines intact, call button in reach and RN notifiedEducation:      GOALS:   Multidisciplinary Problems     Occupational Therapy Goals        Problem: Occupational Therapy Goal    Goal Priority Disciplines Outcome Interventions   Occupational Therapy Goal     OT, PT/OT Ongoing (interventions implemented as appropriate)    Description:  Goals to be met by:  7/14/19     Patient will increase functional independence with ADLs by performing:    UE Dressing with Stand-by Assistance. Met 7/5  Revised: UE dressing with supervision..  Grooming while seated with Supervision.  Toileting from bedside commode with Minimal Assistance for hygiene and clothing management.   Supine to sit with Supervision.  Toilet transfer to bedside commode with Contact Guard Assistance.                       Time Tracking:     OT Date of Treatment: 07/05/19  OT Start Time: 0943  OT Stop Time: 1009  OT Total Time (min): 26 min    Billable Minutes:Therapeutic Activity 10  Therapeutic Exercise 16    Karen Song, GRETTA  7/5/2019

## 2019-07-05 NOTE — PT/OT/SLP PROGRESS
"Speech Language Pathology Treatment    Patient Name:  Sisi Medel   MRN:  [id number]   [id number] A    Admitting Diagnosis: <principal problem not specified>    Recommendations:                 General Recommendations:  Cognitive-linguistic therapy  Diet recommendations:  Regular, Liquid Diet Level: Thin   Aspiration Precautions: Standard aspiration precautions   General Precautions: Standard, fall  Communication strategies:  none    Subjective     Patient awake and cooperative sitting in chair.   Patient states, "Sometimes I might forget things, but it comes to me."    Objective:     Has the patient been evaluated by SLP for swallowing?   Yes  Keep patient NPO? No   Current Respiratory Status: nasal cannula      Patient named 13 fruits given no cues in 60 seconds (improvement from initial evaluation). She recalled functional information after a 5+ minute delay given no cues (min targets). She reports memory strategies in place at home. She required multiple repetitions to complete 4 word mental manipulation tasks. She answered money management questions with 100% acc given repetition of questions. She answered simple time management questions with <50% acc despite repetition of questions and cues. She reports needing glasses to read and write. She was able to read large print off of a booklet with 100% acc. SLP provided education on SLP role, noted high level impairments, and POC for ST 2x a week. Patient v/u and is in agreement with POC.    Assessment:     Sisi Medel is a 58 y.o. female with an SLP diagnosis of high level cognitive-linguistic impairments. ST will continue to follow 2x a week as appropriate.    Goals:   Multidisciplinary Problems     SLP Goals        Problem: SLP Goal    Goal Priority Disciplines Outcome   SLP Goal     SLP Ongoing (interventions implemented as appropriate)   Description:  Goals expected to be met by 7/11:   1. Pt will name >10 items in concrete category within " one minute IND. -MET 7/5  2. Pt will complete mental manipulation of 4 word sets with 90% acc given min cues.   3. Pt will complete functional math word problems (time/money) with 80% acc given min cues.  4. Pt will utilize memory strategies to recall 3 units of novel information after 3-5 minute delay with min cues.    5. Pt will participate in further assessment of reading, writing, and cognition.                      Plan:     · Patient to be seen:  2 x/week   · Plan of Care expires:  08/02/19  · Plan of Care reviewed with:  patient   · SLP Follow-Up:  Yes       Discharge recommendations:      Barriers to Discharge:  None    Time Tracking:     SLP Treatment Date:   07/05/19  Speech Start Time:  1049  Speech Stop Time:  1102     Speech Total Time (min):  13 min    Billable Minutes: Speech Therapy Individual 13    KARLENE Tom, CCC-SLP   Pager: [phone]  07/05/2019
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
## 2019-07-05 NOTE — PT/OT/SLP PROGRESS
"Physical Therapy Treatment    Patient Name:  Sisi Medel   MRN:  [id number]    Recommendations:     Discharge Recommendations:  home health PT   Discharge Equipment Recommendations: (bariatric bedside commode, bath bench)   Barriers to discharge: None    Assessment:     Sisi Medel is a 58 y.o. female admitted with a medical diagnosis of <principal problem not specified>.  She presents with the following impairments/functional limitations:  weakness, impaired endurance, impaired self care skills, impaired functional mobilty, gait instability, impaired balance, decreased lower extremity function, impaired cardiopulmonary response to activity, orthopedic precautions. Per podiatry note 7/4, patient WBAT on R lower extremity with Darco shoe. Patient shows improvement in independence with mobility. She initially required minimum assistance to stand from chair with RW, progressed to contact guard assist. She ambulated 20' x2 with RW and contact guard assist. She is safe to return home with assist from her fiancee and home health therapy.     Rehab Prognosis: Good; patient would benefit from acute skilled PT services to address these deficits and reach maximum level of function.    Recent Surgery: * No surgery found *      Plan:     During this hospitalization, patient to be seen 4 x/week to address the identified rehab impairments via gait training, therapeutic activities, therapeutic exercises and progress toward the following goals:    · Plan of Care Expires:  08/03/19    Subjective     Chief Complaint: "sometimes I get dizzy when I stand up", denies dizziness in standing or SOB, vital signs stable  Patient/Family Comments/goals: patient motivated to ambulate and increase strength  Pain/Comfort:  · Pain Rating 1: 0/10      Objective:     Communicated with RN prior to session.  Patient found up in chair with blood pressure cuff, bed alarm, peripheral IV, pulse ox (continuous), telemetry, oxygen upon PT " entry to room.     General Precautions: Standard, fall   Orthopedic Precautions:(WBAT R leg, bilateral Darco shoes)   Braces: (BIlateral Darco shoes)     Functional Mobility:    Bed Mobility  Deferred, patient sitting up in chair   Transfers Sit to Stand:  1st attempt minimum assistance from bedside chair, 2nd attempt contact guard assist with increased time and heavy reliance of upper extremities for assist   Gait  Gait Distance: 20 x2 ft with RW  Assistance Level: contact guard assist  Description: wide MIKAYLA, increased lateral weight shift, heavy reliance on RW, kyphotic posture, short shuffling strides; cues for posture, reciprocal strides, pacing. Denies SOB. On 4L O2 with gait, VSS.        AM-PAC 6 CLICK MOBILITY  Turning over in bed (including adjusting bedclothes, sheets and blankets)?: 4  Sitting down on and standing up from a chair with arms (e.g., wheelchair, bedside commode, etc.): 3  Moving from lying on back to sitting on the side of the bed?: 4  Moving to and from a bed to a chair (including a wheelchair)?: 3  Need to walk in hospital room?: 3  Climbing 3-5 steps with a railing?: 3  Basic Mobility Total Score: 20       Therapeutic Activities and Exercises:   Patient educated on role of therapy, goals of session, benefits of out of bed mobility. Patient agreeable to mobilize with therapy.  Discussed PT plan of care during hospitalization. Patient educated that they need to call for assistance to mobilize out of bed. Whiteboard updated as appropriate. Patient educated on how their diagnosis impacts their mobility within PT scope of practice.   Performed seated LAQ 12 reps ea during seated rest break following gait.     Patient left up in chair with all lines intact, call button in reach and RN and  notified..    GOALS:   Multidisciplinary Problems     Physical Therapy Goals        Problem: Physical Therapy Goal    Goal Priority Disciplines Outcome Goal Variances Interventions   Physical  Therapy Goal     PT, PT/OT Ongoing (interventions implemented as appropriate)     Description:  Goals to be met by: 2019     Patient will increase functional independence with mobility by performin. Supine to sit with Modified [place]  2. Sit to supine with supervision  3. Sit to stand transfer with Contact Guard Assistance with RW and maintaining any weight bearing precautions- Met   Sit to stand transfer with supervision assistance with RW from bedside chair.  4. Bed to chair transfer with Contact Guard Assistance using Rolling Walker and maintaining any weight bearing precautions.  4B. Bed to w/c transfer using sliding board and maintaining any weight bearing precautions w/ min assist  5. Gait  x 30 feet with Contact Guard Assistance using Rolling Walker. and maintaining any weight bearing precautions  6. Lower extremity exercise program x20 reps per handout, with assistance as needed                        Time Tracking:     PT Received On: 19  PT Start Time: 1024     PT Stop Time: 1047  PT Total Time (min): 23 min     Billable Minutes: Gait Training 15 and Therapeutic Activity 8    Treatment Type: Treatment  PT/PTA: PT     PTA Visit Number: 0     Yolanda Arora, PT  2019

## 2019-07-05 NOTE — PROGRESS NOTES
[facility]  Neurology  Progress Note    Patient Name: Sisi Medel  MRN: [id number]  Admission Date: 7/3/2019  Hospital Length of Stay: 2 days  Code Status: Full Code   Attending Provider: Kush Khan MD  Primary Care Physician: Carlos A Caputo MD   Principal Problem:subdural hematoma    Subjective:     Interval History: NAEON    Current Neurological Medications:     Current Facility-Administered Medications   Medication Dose Route Frequency Provider Last Rate Last Dose    0.9%  NaCl infusion   Intravenous PRN Grant Gomes MD        acetaminophen tablet 650 mg  650 mg Oral Q4H PRN Eugenia Miinea, NP   650 mg at 07/04/19 2147    ammonium lactate 12 % lotion   Topical (Top) BID PRN Emeka Diaz MD        atorvastatin tablet 80 mg  80 mg Oral QHS Eugenia Miinea, NP   80 mg at 07/04/19 2136    dextrose 10% (D10W) Bolus  12.5 g Intravenous PRN Eugenia Miinea, NP        fludrocortisone tablet 200 mcg  200 mcg Oral BID Eugenia Miinea, NP   200 mcg at 07/05/19 0851    glucagon (human recombinant) injection 1 mg  1 mg Intramuscular PRN Eugenia Miinea, NP        heparin (porcine) injection 5,000 Units  5,000 Units Subcutaneous Q8H Mindy Larios MD   5,000 Units at 07/05/19 0518    insulin aspart U-100 pen 1-10 Units  1-10 Units Subcutaneous Q6H PRN Eugenia Miinea, NP   2 Units at 07/05/19 0521    levETIRAcetam tablet 500 mg  500 mg Oral BID Eugenia Miinea, NP   500 mg at 07/05/19 0851    midodrine tablet 10 mg  10 mg Oral TID Eugenia Miinea, NP   10 mg at 07/05/19 0851    ondansetron disintegrating tablet 8 mg  8 mg Oral Q8H PRN Eugenia Miinea, NP        predniSONE tablet 20 mg  20 mg Oral Daily Eugenia Miinea, NP   20 mg at 07/05/19 0851       Review of Systems   Constitutional: Negative.    HENT: Negative.    Eyes: Negative.    Respiratory:        On home oxygen / O2 via nasal cannula   Cardiovascular: Negative.    Gastrointestinal: Negative.    Endocrine: Negative.     Genitourinary: Negative.    Musculoskeletal: Negative.    Allergic/Immunologic: Negative.    Neurological: Negative.    Psychiatric/Behavioral: Negative.      Objective:     Vital Signs (Most Recent):  Temp: 97.5 °F (36.4 °C) (07/05/19 0700)  Pulse: 69 (07/05/19 1000)  Resp: (!) 47 (07/05/19 1000)  BP: 110/81 (07/05/19 1000)  SpO2: (!) 91 % (07/05/19 1000) Vital Signs (24h Range):  Temp:  [97.5 °F (36.4 °C)-97.9 °F (36.6 °C)] 97.5 °F (36.4 °C)  Pulse:  [69-71] 69  Resp:  [13-47] 47  SpO2:  [86 %-100 %] 91 %  BP: ()/(59-86) 110/81     Weight: 110 kg (242 lb 8.1 oz)  Body mass index is 36.87 kg/m².    Physical Exam   Constitutional: She is oriented to person, place, and time. She appears well-developed and well-nourished.   HENT:   Head: Normocephalic.   Eyes: Pupils are equal, round, and reactive to light. Conjunctivae and EOM are normal. Right eye exhibits no discharge. Left eye exhibits no discharge. No scleral icterus.   Cardiovascular: Normal rate and regular rhythm.   Pulmonary/Chest: Effort normal.   On oxygen via nasal cannula     Abdominal: Soft.   Musculoskeletal: Normal range of motion.   Neurological: She is alert and oriented to person, place, and time. She displays normal reflexes. No cranial nerve deficit or sensory deficit. She exhibits normal muscle tone. Coordination normal.   Reflex Scores:       Tricep reflexes are 2+ on the right side and 2+ on the left side.       Bicep reflexes are 2+ on the right side and 2+ on the left side.       Brachioradialis reflexes are 2+ on the right side and 2+ on the left side.       Patellar reflexes are 2+ on the right side and 2+ on the left side.       Achilles reflexes are 2+ on the right side and 2+ on the left side.  Psychiatric: She has a normal mood and affect. Her behavior is normal. Judgment and thought content normal.       NEUROLOGICAL EXAMINATION:     MENTAL STATUS   Oriented to person, place, and time.   Attention: normal.   Level of  consciousness: alert    CRANIAL NERVES   Cranial nerves II through XII intact.     CN III, IV, VI   Pupils are equal, round, and reactive to light.  Extraocular motions are normal.     MOTOR EXAM   Muscle bulk: normal  Overall muscle tone: normal  Right arm tone: normal  Left arm tone: normal  Right arm pronator drift: absent  Left arm pronator drift: absent  Right leg tone: normal  Left leg tone: normal    Strength   Right neck flexion: 5/5  Left neck flexion: 5/5  Right neck extension: 5/5  Left neck extension: 5/5  Right deltoid: 5/5  Left deltoid: 5/5  Right biceps: 5/5  Left biceps: 5/5  Right triceps: 5/5  Left triceps: 5/5  Right wrist flexion: 5/5  Left wrist flexion: 5/5  Right wrist extension: 5/5  Left wrist extension: 5/5  Right interossei: 5/5  Left interossei: 5/5  Right abdominals: 5/5  Left abdominals: 5/5  Right iliopsoas: 5/5  Left iliopsoas: 5/5  Right quadriceps: 5/5  Left quadriceps: 5/5  Right hamstrin/5  Left hamstrin/5  Right glutei: 5/5  Left glutei: 5/5  Right anterior tibial: 5/5  Left anterior tibial: 5/5  Right posterior tibial: 5/5  Left posterior tibial: 5/5  Right peroneal: 5/5  Left peroneal: 5/5  Right gastroc: 5/5  Left gastroc: 5/5    REFLEXES     Reflexes   Right brachioradialis: 2+  Left brachioradialis: 2+  Right biceps: 2+  Left biceps: 2+  Right triceps: 2+  Left triceps: 2+  Right patellar: 2+  Left patellar: 2+  Right achilles: 2+  Left achilles: 2+  Right plantar: normal  Left plantar: normal    SENSORY EXAM   Light touch normal.       Significant Labs: All pertinent lab results from the past 24 hours have been reviewed.    Significant Imaging: I have reviewed all pertinent imaging results/findings within the past 24 hours.  I have reviewed and interpreted all pertinent imaging results/findings within the past 24 hours.    Assessment and Plan:     Obese  Nutrition recs  Diet modification and counseling  Stroke prevention    Fall at home, initial encounter  --s/p  fall at home from wheelchair  --XR alia pelvis-No acute displaced fracture-dislocation identified.  --XR L femur-Visualized osseous structures appear intact, with no definite evidence of recent fracture or other significant abnormality identified.  -XR L knee-No evidence of recent fracture or other significant detrimental interval change since the examinations referenced above.  Old healed left proximal fibular shaft fracture is noted.   --fall precautions  --PT/OT    SDH (subdural hematoma)  --  Continue Neuro checks q 1hr  --  Neurosurgery consulted-no surg intevention  -- CT Head reveals--Interval development of a thin hyperdense extra-axial collection overlying the right tentorium cerebella most compatible with acute subdural hemorrhage.  No significant mass effect or midline shift.  --  repeat CTH stable  --  Pt was on Coumadin and Plavix at home, INR on admit 3.4, holding AC  --  follow PT INR daily  --PCC, DDAVP and vit. K received  --  PT/OT/SLP  --  SBP goal <140, MAP >60    Transaminitis  --present on admission  --trending down  -follow CMP daily            Anemia in ESRD (end-stage renal disease)    -H&H stable on admit   -follow CBC/ CMP daily        Paroxysmal A-fib  --pt at home on warfarin-  --hold anticoagulation for now  --pending repeat CTH on Monday 7/8    Ulcer of lower limb  -- various stages of bilateral lower legs wounds  -- Podiatry consulted-appreciate reccs    Orthostatic hypotension  continue home med Midodrine  -map goal >60    ESRD on hemodialysis    --ESRD on HD, fistula left upper arm, last dialysis Tuesday (7/2).   --nephrology consulted- appreciate reccs  - Short course of dialysis with minimal to no fluid removal 7/4      Biventricular cardiac pacemaker in situ  Biventricular cardiac pacemaker in situ  -pacemaker      Weakness generalized   PT/OT  Fall precautions      Hyperlipidemia  --cont home med atorvastatin      Epilepsy  --Hx of seizures for which she is on keppra at  home  --continue Keppra 500 mg BID  --Seizure precautions           VTE Risk Mitigation (From admission, onward)        Ordered     heparin (porcine) injection 5,000 Units  Every 8 hours      07/04/19 7231          Mindy Larios MD  Neurology  [facility]
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
## 2019-07-05 NOTE — PLAN OF CARE
Problem: Occupational Therapy Goal  Goal: Occupational Therapy Goal  Goals to be met by: 7/14/19     Patient will increase functional independence with ADLs by performing:    UE Dressing with Stand-by Assistance. Met 7/5  Revised: UE dressing with supervision..  Grooming while seated with Supervision.  Toileting from bedside commode with Minimal Assistance for hygiene and clothing management.   Supine to sit with Supervision.  Toilet transfer to bedside commode with Contact Guard Assistance.     Outcome: Ongoing (interventions implemented as appropriate)  Continue with POC. Pt pt progressing well with goals.  Karen Song, OT  7/5/2019

## 2019-07-05 NOTE — PLAN OF CARE
07/05/19 1539   Post-Acute Status   Post-Acute Authorization Home Health/Hospice   Home Health/Hospice Status Referrals Sent  (To N)     SW reviewed chart for DC planning needs. Pt has recs for HH. Sent to N via  as they set up HH for Pt upon DC.    Carrie Guo, RAMIRO  Neurocritical Care   [facility]  [id number]

## 2019-07-05 NOTE — PLAN OF CARE
Problem: SLP Goal  Goal: SLP Goal  Goals expected to be met by 7/11:   1. Pt will name >10 items in concrete category within one minute IND. -MET 7/5  2. Pt will complete mental manipulation of 4 word sets with 90% acc given min cues.   3. Pt will complete functional math word problems (time/money) with 80% acc given min cues.  4. Pt will utilize memory strategies to recall 3 units of novel information after 3-5 minute delay with min cues.    5. Pt will participate in further assessment of reading, writing, and cognition.    Outcome: Ongoing (interventions implemented as appropriate)  Patient progressing towards goals. ST will continue to follow 2x a week for mild cognitive-linguistic impairments.   PHILLIP Hickman., CCC-SLP  Pager: [phone]  07/05/2019
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
## 2019-07-06 NOTE — PROGRESS NOTES
[facility] HEMODIALYSIS NOTE     Patient currently on hemodialysis for removal of uremic toxins and volume.     Patient seen and evaluated on hemodialysis, tolerating treatment, see HD flowsheet for vitals and assessments.      Ultrafiltration goal is 3L   Labs have been reviewed and the dialysate bath has been adjusted.     Assessment/Plan:  Seen on dialysis this afternoon, tolerating well.  Continue iHD while in-patient  Start on renvela with meals    AYAN Joseph, FNP-BC  Nephrology  Pager:  [phone]

## 2019-07-06 NOTE — HOSPITAL COURSE
Trauma survey done showed no acute pelvic, knee or femur fractures. She had bilateral proximal 4th toe fractures which is being managed by Podiatry. She underwent a repeat CTH which was stable. Coumadin resumed and repeat CT at therapeutic dose was again stable. Otherwise, doing well. Will discharge to SNF with PT/OT. She will need coumadin clinic to continue following INR at SNF. Problem based discussion below.    Vitals:    07/19/19 0445 07/19/19 0708 07/19/19 1136 07/19/19 1237   BP: 110/70 109/79 97/66 97/66   BP Location: Right arm      Patient Position: Lying      Pulse: 70 69 72    Resp: 18 14 18    Temp: 97.5 °F (36.4 °C) 96.1 °F (35.6 °C) 98.3 °F (36.8 °C) 98.3 °F (36.8 °C)   TempSrc: Oral      SpO2: 98% 97% 100%    Weight:       Height:         Physical Exam   Constitutional: She is oriented to person, place, and time. She appears well-developed and well-nourished.   obese   HENT:   Head: Normocephalic.   Eyes: Pupils are equal, round, and reactive to light. Conjunctivae and EOM are normal. Right eye exhibits no discharge. Left eye exhibits no discharge. No scleral icterus.   Cardiovascular: Normal rate and regular rhythm.   Pulmonary/Chest: Effort normal. No tachypnea. She has rhonchi.   On oxygen via nasal cannula   Abdominal: Soft. She exhibits no distension.   Musculoskeletal: Normal range of motion. She exhibits no edema.   Neurological: She is alert and oriented to person, place, and time. She displays normal reflexes. No cranial nerve deficit or sensory deficit. She exhibits normal muscle tone. Coordination normal.   Psychiatric: Judgment normal. Her affect is labile. She exhibits a depressed mood.

## 2019-07-06 NOTE — PLAN OF CARE
Problem: Adult Inpatient Plan of Care  Goal: Plan of Care Review  HD Tx complete. toleraed well. 3L removed in a 3.5hr Tx. Blood returned via RAISSA AVG. 15g needles removed x2, gauze and tape applied, pressure held for 5mins, hemostasis achieved.

## 2019-07-06 NOTE — SUBJECTIVE & OBJECTIVE
Interval History: Patient seen and examined at bedside. No acute issues. Denies headache, nausea, vomiting, focal/lateralizing neurological deficits, dizziness. Appeared SOB; patient stated this was at baseline. Eager for discharge. Reviewed POC; plan is for rpt CT tomorrow.     Review of Systems   Constitutional: Negative.    HENT: Negative.    Eyes: Negative.    Respiratory:        On home oxygen / O2 via nasal cannula   Cardiovascular: Negative.    Gastrointestinal: Negative.    Endocrine: Negative.    Genitourinary: Negative.    Musculoskeletal: Negative.    Allergic/Immunologic: Negative.    Neurological: Negative.    Psychiatric/Behavioral: Negative.      Objective:     Vital Signs (Most Recent):  Temp: 96.8 °F (36 °C) (07/06/19 1525)  Pulse: 70 (07/06/19 1616)  Resp: 14 (07/06/19 1616)  BP: 125/68 (07/06/19 1616)  SpO2: 95 % (07/06/19 1616) Vital Signs (24h Range):  Temp:  [96 °F (35.6 °C)-98 °F (36.7 °C)] 96.8 °F (36 °C)  Pulse:  [52-76] 70  Resp:  [14-20] 14  SpO2:  [91 %-98 %] 95 %  BP: ()/(36-84) 125/68     Weight: 110 kg (242 lb 8.1 oz)  Body mass index is 36.87 kg/m².    Intake/Output Summary (Last 24 hours) at 7/6/2019 1757  Last data filed at 7/6/2019 1525  Gross per 24 hour   Intake 1250 ml   Output 3650 ml   Net -2400 ml      Physical Exam   Constitutional: She is oriented to person, place, and time. She appears well-developed and well-nourished.   obese   HENT:   Head: Normocephalic.   Eyes: Pupils are equal, round, and reactive to light. Conjunctivae and EOM are normal. Right eye exhibits no discharge. Left eye exhibits no discharge. No scleral icterus.   Cardiovascular: Normal rate and regular rhythm.   Pulmonary/Chest: Effort normal. Tachypnea noted. She has rhonchi.   On oxygen via nasal cannula   Abdominal: Soft.   Musculoskeletal: Normal range of motion. She exhibits no edema.   Neurological: She is alert and oriented to person, place, and time. She displays normal reflexes. No cranial  nerve deficit or sensory deficit. She exhibits normal muscle tone. Coordination normal.   Reflex Scores:       Tricep reflexes are 2+ on the right side and 2+ on the left side.       Bicep reflexes are 2+ on the right side and 2+ on the left side.       Brachioradialis reflexes are 2+ on the right side and 2+ on the left side.       Patellar reflexes are 2+ on the right side and 2+ on the left side.       Achilles reflexes are 2+ on the right side and 2+ on the left side.  Psychiatric: She has a normal mood and affect. Her behavior is normal. Judgment and thought content normal.       Significant Labs:   CBC:   Recent Labs   Lab 07/05/19  0149 07/06/19  0641   WBC 16.79* 16.98*   HGB 7.2* 7.1*   HCT 25.5* 25.4*    273     CMP:   Recent Labs   Lab 07/05/19  0530 07/05/19  1433 07/06/19  0641     --  136   K 5.7* 4.8 5.9*     --  99   CO2 23  --  16*   *  --  118*   BUN 30*  --  50*   CREATININE 4.7*  --  6.3*   CALCIUM 8.4*  --  8.1*   PROT 6.7  --  6.7   ALBUMIN 2.9*  --  2.8*   BILITOT 0.9  --  1.3*   ALKPHOS 122  --  152*   AST 73*  --  55*   ALT 58*  --  61*   ANIONGAP 14  --  21*   EGFRNONAA 9.7*  --  6.8*       Significant Imaging: I have reviewed and interpreted all pertinent imaging results/findings within the past 24 hours.

## 2019-07-06 NOTE — PLAN OF CARE
Problem: Adult Inpatient Plan of Care  Goal: Plan of Care Review  Outcome: Ongoing (interventions implemented as appropriate)  Pt arrived to [facility] after 4 pm and was a transfer. Vitals stable, pt is anxious to get up out of bed and wants to start ambulating.  Let pt know that due to her being a fall risk, she should not be getting out of bed on her own.  She has an understanding that if she ever has to get up from bed, she would call for help.  Pt last BM was 7/2/19, anuric, is on hemodialysis and stated that she doesn't produce urine.  Otherwise, pt has PT consult and she stated that she could ambulate with walker.  No significant events during the transfer.  Pt is to have repeat CT on Monday, coumadin is currently being held.

## 2019-07-06 NOTE — ASSESSMENT & PLAN NOTE
58 F with pulmonary sarcoidosis on 3-5L home oxygen, Class 3 HFrEF (3/2019 EF 35%), perm Afib/flutter s/p 6/2017 AVN ablation & PPM, ESRD (TTS HD via AVF, unknown baseline Cr), Type 2 DM (3/2019 A1c 6.2%), pulmonary HTN, YOANDY, and orthostatic hypotension that presented to ED s/p fall from wheelchair with acute R SDH. Given K-Centra in ED to reverse AC/APT.    - Neurologically stable  - No acute neurosurgical intervention necessary at this time  - Repeat CTH Monday 7/8, if stable can resume therapeutic AC and trend INRs  - SQH  - PTOT OOB  - SBP <160  - Na 135-145  - Keppra 500 BID x2 weeks  - Remainder of medical management per primary team   - Will continue to monitor closely, please page with questions/concerns
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
## 2019-07-06 NOTE — SUBJECTIVE & OBJECTIVE
Interval History: NAEON. Stepped down to floor. Tolerating PO. Would like to work with PT to get out of bed more.    Medications:  Continuous Infusions:  Scheduled Meds:   sodium chloride 0.9%   Intravenous Once    atorvastatin  80 mg Oral QHS    fludrocortisone  200 mcg Oral BID    heparin (porcine)  5,000 Units Subcutaneous Q8H    levETIRAcetam  500 mg Oral BID    midodrine  10 mg Oral TID    predniSONE  20 mg Oral Daily     PRN Meds:sodium chloride 0.9%, acetaminophen, ammonium lactate, Dextrose 10% Bolus, glucagon (human recombinant), insulin aspart U-100, ondansetron     Review of Systems  Objective:     Weight: 110 kg (242 lb 8.1 oz)  Body mass index is 36.87 kg/m².  Vital Signs (Most Recent):  Temp: 97.4 °F (36.3 °C) (07/06/19 0757)  Pulse: 70 (07/06/19 0757)  Resp: 16 (07/06/19 0757)  BP: 111/68 (07/06/19 0757)  SpO2: (!) 91 % (07/06/19 0757) Vital Signs (24h Range):  Temp:  [96 °F (35.6 °C)-98 °F (36.7 °C)] 97.4 °F (36.3 °C)  Pulse:  [] 70  Resp:  [16-47] 16  SpO2:  [82 %-100 %] 91 %  BP: ()/(59-84) 111/68                Oxygen Concentration (%):  [36] 36         Hemodialysis AV Graft Left upper arm (Active)   Needle Size 15ga 7/4/2019  6:15 PM   Site Assessment Clean;Dry;Intact 7/5/2019  7:25 PM   Patency Present;Thrill;Bruit 7/4/2019  6:15 PM   Status Accessed 7/4/2019  6:15 PM   Flows Good 7/4/2019  6:15 PM       Neurosurgery Physical Exam  GCS 15  AOx3  CN2-12 Intact to exam  No drift  FCx4 with full strength  SILT    Significant Labs:  Recent Labs   Lab 07/05/19  0530 07/05/19  1433 07/06/19  0641   *  --  118*     --  136   K 5.7* 4.8 5.9*     --  99   CO2 23  --  16*   BUN 30*  --  50*   CREATININE 4.7*  --  6.3*   CALCIUM 8.4*  --  8.1*   MG 2.1  --  2.2     Recent Labs   Lab 07/05/19 0149 07/06/19 0641   WBC 16.79* 16.98*   HGB 7.2* 7.1*   HCT 25.5* 25.4*    273     Recent Labs   Lab 07/05/19 0149 07/06/19 0641   INR 1.2 1.5*     Microbiology Results  (last 7 days)     ** No results found for the last 168 hours. **        All pertinent labs from the last 24 hours have been reviewed.    Significant Diagnostics:  I have reviewed all pertinent imaging results/findings within the past 24 hours.

## 2019-07-07 NOTE — PROGRESS NOTES
Dr. Cordero notified that IV access was lost approximately 1 hour into transfusion and at this time efforts to re-establish access have not been successful.  Charge RN has contacted ICU an anesthesia to come to attempt as well.
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
## 2019-07-07 NOTE — SUBJECTIVE & OBJECTIVE
Interval History: 7/7: ARELY, AFVSS, Exam stable, plan for CTH in AM prior to resuming AC    Medications:  Continuous Infusions:  Scheduled Meds:   atorvastatin  80 mg Oral QHS    fludrocortisone  200 mcg Oral BID    heparin (porcine)  5,000 Units Subcutaneous Q8H    levETIRAcetam  500 mg Oral BID    midodrine  10 mg Oral TID    predniSONE  20 mg Oral Daily    senna-docusate 8.6-50 mg  1 tablet Oral BID    sevelamer carbonate  800 mg Oral TID WM     PRN Meds:sodium chloride, sodium chloride 0.9%, acetaminophen, ammonium lactate, Dextrose 10% Bolus, glucagon (human recombinant), insulin aspart U-100, LORazepam, ondansetron, polyethylene glycol     Review of Systems    Objective:     Weight: 110 kg (242 lb 8.1 oz)  Body mass index is 36.87 kg/m².  Vital Signs (Most Recent):  Temp: 97.8 °F (36.6 °C) (07/07/19 1615)  Pulse: 70 (07/07/19 1615)  Resp: 20 (07/07/19 1615)  BP: 118/83 (07/07/19 1615)  SpO2: 98 % (07/07/19 1615) Vital Signs (24h Range):  Temp:  [95.7 °F (35.4 °C)-97.9 °F (36.6 °C)] 97.8 °F (36.6 °C)  Pulse:  [68-73] 70  Resp:  [14-20] 20  SpO2:  [94 %-99 %] 98 %  BP: (107-119)/(70-83) 118/83     Date 07/07/19 0700 - 07/08/19 0659   Shift 7826-6785 1260-7417 0748-6245 24 Hour Total   INTAKE   Blood  147.4  147.4   Shift Total(mL/kg)  147.4(1.3)  147.4(1.3)   OUTPUT   Shift Total(mL/kg)       Weight (kg) 110 110 110 110                        Hemodialysis AV Graft Left upper arm (Active)   Needle Size 15ga 7/4/2019  6:15 PM   Site Assessment Clean;Dry;Intact 7/5/2019  7:25 PM   Patency Present;Thrill;Bruit 7/4/2019  6:15 PM   Status Accessed 7/4/2019  6:15 PM   Flows Good 7/4/2019  6:15 PM       Neurosurgery Physical Exam    GCS 15  AOx3  CN2-12 Intact to exam  No drift  FCx4 with full strength  SILT    Significant Labs:  Recent Labs   Lab 07/06/19  0641 07/07/19  0142   * 111*    134*   K 5.9* 4.7   CL 99 97   CO2 16* 21*   BUN 50* 28*   CREATININE 6.3* 3.9*   CALCIUM 8.1* 8.5*   MG 2.2  2.1     Recent Labs   Lab 07/06/19  0641 07/07/19  0142   WBC 16.98* 18.03*   HGB 7.1* 7.0*   HCT 25.4* 23.9*    276     Recent Labs   Lab 07/06/19  0641 07/07/19  0142   INR 1.5* 1.5*     Microbiology Results (last 7 days)     ** No results found for the last 168 hours. **        All pertinent labs from the last 24 hours have been reviewed.    Significant Diagnostics:  I have reviewed all pertinent imaging results/findings within the past 24 hours.

## 2019-07-07 NOTE — PROGRESS NOTES
ICU RN started #20 to right AC.  Transfusion resumed at 125ml/hr.  Within 5 minutes of resuming patient began to complain of burning.  IV flushes easily with blood return.  Placed ice-pack over insertion site without relief.  Patient refused to allow continuation of transfusion.  Charge RN aware.  Dr. Gil lovett.

## 2019-07-07 NOTE — PLAN OF CARE
Problem: Adult Inpatient Plan of Care  Goal: Plan of Care Review  Outcome: Ongoing (interventions implemented as appropriate)     07/07/19 1940   Plan of Care Review   Plan of Care Reviewed With patient     Pt remain free from fall and injuries. Tolerated meds well. Denies SOB, VSS, Acetaminophen as pre request. Bed locked and lowest position. Placed call light to easy reach. Will monitor.
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
## 2019-07-07 NOTE — SUBJECTIVE & OBJECTIVE
"Interval History: Patient seen and examined at bedside. No acute issues overnight. On evaluation, emotionally distressed and teary. "I don't know what I'm going to do, I can't even walk, I'm so weak. A month ago I was doing everything for myself". Reassurance provided. Patient is recovering from acute illness on background of chronic disease and will need some more time to recover. Also significantly anemia, Hg 7 dl/g today. Will transfuse 1u. Consent obtained. Continue PT/OT; may need SNF given profound weakness.      Review of Systems   Constitutional: Positive for activity change and fatigue.   HENT: Negative.    Eyes: Negative.    Respiratory: Positive for shortness of breath (chronic).         On home oxygen / O2 via nasal cannula   Cardiovascular: Negative.    Gastrointestinal: Negative.  Negative for constipation, diarrhea, nausea and vomiting.   Endocrine: Negative.    Genitourinary: Negative.    Musculoskeletal: Negative.    Allergic/Immunologic: Negative.    Neurological: Positive for weakness.   Psychiatric/Behavioral: The patient is nervous/anxious.      Objective:     Vital Signs (Most Recent):  Temp: 96.4 °F (35.8 °C) (07/07/19 1217)  Pulse: 70 (07/07/19 1217)  Resp: 18 (07/07/19 1217)  BP: 118/81 (07/07/19 1217)  SpO2: 96 % (07/07/19 1217) Vital Signs (24h Range):  Temp:  [95.7 °F (35.4 °C)-97.9 °F (36.6 °C)] 96.4 °F (35.8 °C)  Pulse:  [52-73] 70  Resp:  [14-20] 18  SpO2:  [94 %-99 %] 96 %  BP: ()/(36-81) 118/81     Weight: 110 kg (242 lb 8.1 oz)  Body mass index is 36.87 kg/m².    Intake/Output Summary (Last 24 hours) at 7/7/2019 1219  Last data filed at 7/7/2019 0623  Gross per 24 hour   Intake 900 ml   Output 3650 ml   Net -2750 ml      Physical Exam   Constitutional: She is oriented to person, place, and time. She appears well-developed and well-nourished.   obese   HENT:   Head: Normocephalic.   Eyes: Pupils are equal, round, and reactive to light. Conjunctivae and EOM are normal. Right eye " exhibits no discharge. Left eye exhibits no discharge. No scleral icterus.   Cardiovascular: Normal rate and regular rhythm.   Pulmonary/Chest: Effort normal. Tachypnea noted. She has rhonchi.   On oxygen via nasal cannula   Abdominal: Soft.   Musculoskeletal: Normal range of motion. She exhibits no edema.   Neurological: She is alert and oriented to person, place, and time. She displays normal reflexes. No cranial nerve deficit or sensory deficit. She exhibits normal muscle tone. Coordination normal.   Reflex Scores:       Tricep reflexes are 2+ on the right side and 2+ on the left side.       Bicep reflexes are 2+ on the right side and 2+ on the left side.       Brachioradialis reflexes are 2+ on the right side and 2+ on the left side.       Patellar reflexes are 2+ on the right side and 2+ on the left side.       Achilles reflexes are 2+ on the right side and 2+ on the left side.  Psychiatric: She has a normal mood and affect. Her behavior is normal. Judgment and thought content normal.     Significant Labs:   CBC:   Recent Labs   Lab 07/06/19  0641 07/07/19  0142   WBC 16.98* 18.03*   HGB 7.1* 7.0*   HCT 25.4* 23.9*    276     CMP:   Recent Labs   Lab 07/05/19  1433 07/06/19  0641 07/07/19  0142   NA  --  136 134*   K 4.8 5.9* 4.7   CL  --  99 97   CO2  --  16* 21*   GLU  --  118* 111*   BUN  --  50* 28*   CREATININE  --  6.3* 3.9*   CALCIUM  --  8.1* 8.5*   PROT  --  6.7 6.9   ALBUMIN  --  2.8* 3.0*   BILITOT  --  1.3* 1.7*   ALKPHOS  --  152* 257*   AST  --  55* 86*   ALT  --  61* 83*   ANIONGAP  --  21* 16   EGFRNONAA  --  6.8* 12.1*       Significant Imaging: I have reviewed and interpreted all pertinent imaging results/findings within the past 24 hours.

## 2019-07-08 NOTE — PHYSICIAN QUERY
PT Name: Sisi Medel  MR #: [id number]    Physician Query Form - Atrial Flutter Specificity Clarification     CDS/: Daphne Vargas RN, CDS               Contact information: [email or web address]  This form is a permanent document in the medical record.     Query Date: July 8, 2019    By submitting this query, we are merely seeking further clarification of documentation. Please utilize your independent clinical judgment when addressing the question(s) below.    The medical record contains the following:   Indicators     Supporting Clinical Findings Location in Medical Record   x Atrial Flutter --History of present Illness: perm Afib/flutter    NCC H&P 7/3->Neuro Sx Note 7/8     x EKG results --Ventricular-paced rhythm with frequent Premature ventricular complexes  --Biventricular hypertrophy  --Atrial fibrillation present  --Vent. Rate : 077 BPM     Atrial Rate : 046 BPM   EKG 7/3    Medication     x Treatment -- s/p 6/2017 AVN ablation & PPM   NCC H&P 7/3->Neuro Sx Note 7/8     x Other --Paroxysmal A-fib            -pt at home on warfarin   NCC H&P 7/3->Neuro Sx Note 7/8     Provider, please further specify the Atrial Flutter diagnosis.    [   ] Atypical   [   ] Type I   [   ] Type II   [   ] Typical   [   ] Ruled Out   [   ] Other (please specify):   [ x ] Clinically Undetermined         Please document in your progress notes daily for the duration of treatment until resolved, and include in your discharge summary.

## 2019-07-08 NOTE — PLAN OF CARE
Problem: Adult Inpatient Plan of Care  Goal: Plan of Care Review  Outcome: Ongoing (interventions implemented as appropriate)  Pt worked with PT/OT today and sat up in chair for about two hours.  Then she ambulated to stretcher about 20 feet with gait belt and assistance of PT/OT and nurse, with 3L nasal canula but pt experienced high anxiety during this time b/c she was afraid of falling.  Reassured pt that she was okay and that two of staff was supporting her and she would not fall.  Pt was also taken to hemodialysis today around  1 pm, ran for 4 hours, took off 3 liters and is returning towards end of day shift.

## 2019-07-08 NOTE — SUBJECTIVE & OBJECTIVE
Interval History: No acute events reported.  Patient complaining of right arm pain 2/2 ecchymosis around her IV site.  Admits to mild headache and nausea. Denies visual changes, vomiting, acute focal weakness. Afebrile. VS stable.     Medications:  Continuous Infusions:  Scheduled Meds:   sodium chloride 0.9%   Intravenous Once    atorvastatin  80 mg Oral QHS    fludrocortisone  200 mcg Oral BID    heparin (porcine)  5,000 Units Subcutaneous Q8H    levETIRAcetam  500 mg Oral BID    midodrine  10 mg Oral TID    predniSONE  20 mg Oral Daily    senna-docusate 8.6-50 mg  1 tablet Oral BID    sevelamer carbonate  800 mg Oral TID WM     PRN Meds:sodium chloride, sodium chloride 0.9%, acetaminophen, ammonium lactate, Dextrose 10% Bolus, glucagon (human recombinant), insulin aspart U-100, LORazepam, ondansetron, polyethylene glycol     Review of Systems   Eyes: Negative for visual disturbance.   Gastrointestinal: Positive for nausea. Negative for vomiting.   Neurological: Positive for headaches. Negative for dizziness and weakness.     Objective:     Weight: 110 kg (242 lb 8.1 oz)  Body mass index is 36.87 kg/m².  Vital Signs (Most Recent):  Temp: 96.1 °F (35.6 °C) (07/08/19 1202)  Pulse: 71 (07/08/19 1229)  Resp: 18 (07/08/19 1202)  BP: 119/77 (07/08/19 1202)  SpO2: 96 % (07/08/19 1202) Vital Signs (24h Range):  Temp:  [96.1 °F (35.6 °C)-98 °F (36.7 °C)] 96.1 °F (35.6 °C)  Pulse:  [68-72] 71  Resp:  [14-20] 18  SpO2:  [94 %-98 %] 96 %  BP: (103-129)/(70-85) 119/77     Date 07/08/19 0700 - 07/09/19 0659   Shift 4095-2015 8963-8672 5336-3194 24 Hour Total   INTAKE   P.O. 250   250   Shift Total(mL/kg) 250(2.3)   250(2.3)   OUTPUT   Shift Total(mL/kg)       Weight (kg) 110 110 110 110                        Hemodialysis AV Graft Left upper arm (Active)   Needle Size 15ga 7/6/2019  3:25 PM   Site Assessment Clean;Dry;Intact 7/7/2019  7:34 PM   Patency Bruit;Thrill;Present 7/7/2019  7:13 AM   Status Deaccessed 7/6/2019   3:25 PM   Flows Good 7/6/2019  3:25 PM   Dressing Intervention New dressing 7/6/2019  3:25 PM   Dressing Status Clean;Dry;Intact 7/6/2019  3:25 PM   Site Condition No complications 7/6/2019  3:25 PM   Dressing Gauze 7/6/2019  3:25 PM       Neurosurgery Physical Exam     General: well developed, well nourished, no distress.   Head: normocephalic, atraumatic  Neurologic: Alert and oriented. Thought content appropriate.  GCS: Motor: 6/Verbal: 5/Eyes: 4 GCS Total: 15  Mental Status: Awake, Alert, Oriented x 4  Language: No aphasia  Speech: No dysarthria  Cranial nerves: face symmetric, tongue midline, CN II-XII grossly intact.   Eyes: pupils equal, round, reactive to light with accomodation, EOMI.   Pulmonary: normal respirations, no signs of respiratory distress  Abdomen: soft, non-distended, not tender to palpation  Skin: Skin is warm, dry and intact.  Significant ecchymosis and edema noted on right UE.   Sensory: intact to light touch throughout  Motor Strength:Moves all extremities spontaneously with good tone.  Full strength upper and lower extremities, although RUE exam somewhat limited by pain.. No abnormal movements seen.      Cerebellar:   Finger-to-nose: intact bilaterally   Pronator drift: absent bilaterally  Gait deferred.    Significant Labs:  Recent Labs   Lab 07/07/19 0142 07/08/19  0902   * 104   * 131*   K 4.7 5.8*   CL 97 94*   CO2 21* 16*   BUN 28* 53*   CREATININE 3.9* 6.4*   CALCIUM 8.5* 8.4*   MG 2.1 2.3     Recent Labs   Lab 07/07/19  0142 07/08/19  0902   WBC 18.03* 17.27*   HGB 7.0* 7.4*   HCT 23.9* 25.1*    295     Recent Labs   Lab 07/07/19  0142 07/08/19  0902   INR 1.5* 1.6*     Microbiology Results (last 7 days)     ** No results found for the last 168 hours. **        All pertinent labs from the last 24 hours have been reviewed.    Significant Diagnostics:  CT: Ct Head Without Contrast    Result Date: 7/8/2019  No significant detrimental change from prior.  No  evidence of new intracranial hemorrhage. Electronically signed by: Roddy Bennett MD Date:    07/08/2019 Time:    12:00  I have reviewed and interpreted all pertinent imaging results/findings within the past 24 hours.  Agree with impression above.

## 2019-07-08 NOTE — PT/OT/SLP PROGRESS
Physical Therapy Treatment    Patient Name:  iSsi Medel   MRN:  [id number]    Recommendations:     Discharge Recommendations:  home health PT   Discharge Equipment Recommendations: other (see comments)(bariatric bedside commode and bath bench)   Barriers to discharge: None    Assessment:     Sisi Medel is a 58 y.o. female admitted with a medical diagnosis of SDH (subdural hematoma).  She presents with the following impairments/functional limitations:  weakness, impaired endurance, impaired self care skills, impaired functional mobilty, gait instability, impaired balance, decreased upper extremity function, decreased lower extremity function, decreased safety awareness, pain, impaired cardiopulmonary response to activity, orthopedic precautions. Pt tolerated session fairly with focus on transfers and gait training. Pt educated on PT POC and d/c disposition d/t pt questioning writing therapist on her ability to go home. Pt states she is fearful of falling and feels unready to return to home. MD entering during session to discuss concerns for pts return to home regarding her recent fall and concerns for safety in home environment. PT notified and to assess pt on next visit. Pt will continue to benefit from therapy services to address impairments listed above.     Rehab Prognosis: Good; patient would benefit from acute skilled PT services to address these deficits and reach maximum level of function.    Recent Surgery: * No surgery found *      Plan:     During this hospitalization, patient to be seen 4 x/week to address the identified rehab impairments via gait training, therapeutic activities, therapeutic exercises and progress toward the following goals:    · Plan of Care Expires:  08/03/19    Subjective     Chief Complaint: fear of falling/return to home  Patient/Family Comments/goals:   Pain/Comfort:  · Pain Rating 1: 0/10  · Pain Rating Post-Intervention 1: 0/10      Objective:      Communicated with NSG prior to session.  Patient found up in chair with oxygen, telemetry upon PTA entry to room.     General Precautions: Standard, fall   Orthopedic Precautions:RLE weight bearing as tolerated(B Darco per podiatry)   Braces: (B Darco shoes)     Functional Mobility:  · Transfers:     · Sit to Stand:  moderate assistance with rolling walker  · Gait: Pt ambulates 16 ft with RW and CGA from chair to stretcher in hallway. Pt with FFP and rushed merly with several voiced c/o of falling. Pt steady but appears extremely limited by fear of falling, resulting in increased SOB on exertion with minimal response to cues.   · Balance: Pt stands with RW and SBA for static standing balance.       AM-PAC 6 CLICK MOBILITY  Turning over in bed (including adjusting bedclothes, sheets and blankets)?: 4  Sitting down on and standing up from a chair with arms (e.g., wheelchair, bedside commode, etc.): 2  Moving from lying on back to sitting on the side of the bed?: 4  Moving to and from a bed to a chair (including a wheelchair)?: 3  Need to walk in hospital room?: 3  Climbing 3-5 steps with a railing?: 3  Basic Mobility Total Score: 19       Therapeutic Activities and Exercises:   Pt educated as noted in assessment.   Pt requires encouragement to participate d/t fear of OOB mobility this date.  Pt assisted with ambulation and transfer to transport stretcher.     Patient left on transport stretcher with all lines intact and NSG and transporter present.    GOALS:   Multidisciplinary Problems     Physical Therapy Goals        Problem: Physical Therapy Goal    Goal Priority Disciplines Outcome Goal Variances Interventions   Physical Therapy Goal     PT, PT/OT Ongoing (interventions implemented as appropriate)     Description:  Goals to be met by: 2019     Patient will increase functional independence with mobility by performin. Supine to sit with Modified [place]  2. Sit to supine with supervision  3.  Sit to stand transfer with Contact Guard Assistance with RW and maintaining any weight bearing precautions- Met 7/5  Sit to stand transfer with supervision assistance with RW from bedside chair.  4. Bed to chair transfer with Contact Guard Assistance using Rolling Walker and maintaining any weight bearing precautions.  4B. Bed to w/c transfer using sliding board and maintaining any weight bearing precautions w/ min assist  5. Gait  x 30 feet with Contact Guard Assistance using Rolling Walker. and maintaining any weight bearing precautions  6. Lower extremity exercise program x20 reps per handout, with assistance as needed                        Time Tracking:     PT Received On: 07/08/19  PT Start Time: 1052     PT Stop Time: 1118  PT Total Time (min): 26 min     Billable Minutes: Gait Training 8 and Therapeutic Activity 18    Treatment Type: Treatment  PT/PTA: PTA     PTA Visit Number: 1     Bernabe Branch PTA  07/08/2019

## 2019-07-08 NOTE — PLAN OF CARE
Problem: Physical Therapy Goal  Goal: Physical Therapy Goal  Goals to be met by: 2019     Patient will increase functional independence with mobility by performin. Supine to sit with Modified [place]  2. Sit to supine with supervision  3. Sit to stand transfer with Contact Guard Assistance with RW and maintaining any weight bearing precautions- Met   Sit to stand transfer with supervision assistance with RW from bedside chair.  4. Bed to chair transfer with Contact Guard Assistance using Rolling Walker and maintaining any weight bearing precautions.  4B. Bed to w/c transfer using sliding board and maintaining any weight bearing precautions w/ min assist  5. Gait  x 30 feet with Contact Guard Assistance using Rolling Walker. and maintaining any weight bearing precautions  6. Lower extremity exercise program x20 reps per handout, with assistance as needed       Outcome: Ongoing (interventions implemented as appropriate)  Goals remain appropriate.

## 2019-07-08 NOTE — PROGRESS NOTES
[facility]FF HEMODIALYSIS NOTE     Patient currently on hemodialysis for removal of uremic toxins and volume.     Patient seen and evaluated on hemodialysis, tolerating treatment, see HD flowsheet for vitals and assessments.      Ultrafiltration goal is 3L as tolerated (Pre-HD weight today is 113kg, DW is 109kg), keep map >65     Labs have been reviewed and the dialysate bath has been adjusted.     Assessment/Plan:    -Patient seen on HD, tolerating treatment well, w/o complaints  -Outpatient HD records reviewed    -Brought pt for HD today for K of 5.8  -Will bring patient back tomorrow to place her back on her normal T/Th/Sat schedule   -Renal diet  -Strict I/O's and daily weights  -Daily renal function panels  -Hbg 7.4, Patient receives venofer and Epo outpatient T/Th/Sat, will restart for T/Th/Sat treatments   -Phos 5.8, continue renvela with meals   -Will continue to follow while inpatient       AYAN Levine, AGNP-C  Nephrology  Pager:  [phone]

## 2019-07-08 NOTE — SUBJECTIVE & OBJECTIVE
"Interval History: Patient seen and examined at bedside. No acute issues overnight. On evaluation, emotionally distressed and teary. Complaining of painful IV in R forearm. Hematoma and swelling observed. Also teary about potential upcoming discharge (in a few days). "I'm not ready". Liver enzymes are elevated; US, infectious panels ordered.     Review of Systems   Constitutional: Positive for activity change and fatigue.   HENT: Negative.    Eyes: Negative.    Respiratory: Positive for shortness of breath (chronic).         On home oxygen / O2 via nasal cannula   Cardiovascular: Negative.    Gastrointestinal: Negative.  Negative for constipation, diarrhea, nausea and vomiting.   Endocrine: Negative.    Genitourinary: Negative.    Musculoskeletal:        R forearm pain and numbness   Skin: Positive for color change (hematoma).   Allergic/Immunologic: Negative.    Neurological: Positive for weakness.   Psychiatric/Behavioral: The patient is nervous/anxious.      Objective:     Vital Signs (Most Recent):  Temp: 97.5 °F (36.4 °C) (07/08/19 1339)  Pulse: 69 (07/08/19 1415)  Resp: 18 (07/08/19 1339)  BP: 120/65 (07/08/19 1415)  SpO2: 96 % (07/08/19 1202) Vital Signs (24h Range):  Temp:  [96.1 °F (35.6 °C)-98 °F (36.7 °C)] 97.5 °F (36.4 °C)  Pulse:  [68-89] 69  Resp:  [14-20] 18  SpO2:  [94 %-98 %] 96 %  BP: (103-129)/(65-85) 120/65     Weight: 110 kg (242 lb 8.1 oz)  Body mass index is 36.87 kg/m².    Intake/Output Summary (Last 24 hours) at 7/8/2019 1430  Last data filed at 7/8/2019 0845  Gross per 24 hour   Intake 597.42 ml   Output 0 ml   Net 597.42 ml      Physical Exam   Constitutional: She is oriented to person, place, and time. She appears well-developed and well-nourished.   obese   HENT:   Head: Normocephalic.   Eyes: Pupils are equal, round, and reactive to light. Conjunctivae and EOM are normal. Right eye exhibits no discharge. Left eye exhibits no discharge. No scleral icterus.   Cardiovascular: Normal rate " and regular rhythm.   Pulmonary/Chest: Effort normal. Tachypnea noted. She has rhonchi.   On oxygen via nasal cannula   Abdominal: Soft.   Musculoskeletal: Normal range of motion. She exhibits no edema.   Neurological: She is alert and oriented to person, place, and time. She displays normal reflexes. No cranial nerve deficit or sensory deficit. She exhibits normal muscle tone. Coordination normal.   Psychiatric: Judgment normal. Her affect is labile. She exhibits a depressed mood.     Significant Labs:   CBC:   Recent Labs   Lab 07/07/19  0142 07/08/19  0902   WBC 18.03* 17.27*   HGB 7.0* 7.4*   HCT 23.9* 25.1*    295     CMP:   Recent Labs   Lab 07/07/19  0142 07/08/19  0902   * 131*   K 4.7 5.8*   CL 97 94*   CO2 21* 16*   * 104   BUN 28* 53*   CREATININE 3.9* 6.4*   CALCIUM 8.5* 8.4*   PROT 6.9 7.1   ALBUMIN 3.0* 3.0*   BILITOT 1.7* 2.7*   ALKPHOS 257* 407*   AST 86* 157*   ALT 83* 145*   ANIONGAP 16 21*   EGFRNONAA 12.1* 6.6*       Significant Imaging: I have reviewed and interpreted all pertinent imaging results/findings within the past 24 hours.

## 2019-07-08 NOTE — PLAN OF CARE
Problem: Occupational Therapy Goal  Goal: Occupational Therapy Goal  Goals to be met by: 7/14/19     Patient will increase functional independence with ADLs by performing:    UE Dressing with Stand-by Assistance. Met 7/5  Revised: UE dressing with supervision..  Grooming while seated with Supervision.  Toileting from bedside commode with Minimal Assistance for hygiene and clothing management.   Supine to sit with Supervision.  Toilet transfer to bedside commode with Contact Guard Assistance.      Outcome: Ongoing (interventions implemented as appropriate)  ERIC Jones

## 2019-07-08 NOTE — NURSING
Pt stated that her arm is burning and hurting at IV site.  She is also hard stick and has had multiple attempts.

## 2019-07-08 NOTE — PLAN OF CARE
Problem: Adult Inpatient Plan of Care  Goal: Plan of Care Review  Outcome: Ongoing (interventions implemented as appropriate)     07/08/19 0610   Plan of Care Review   Plan of Care Reviewed With patient     Pt remain free from fall and injuries. Denies any pain and discomfort. Refused Heparin this shift. VSS. Blood glucose monitored. Wound care. Safety maintained. Will monitor.

## 2019-07-08 NOTE — PROGRESS NOTES
[facility] Hospital Medicine  Progress Note    Patient Name: Sisi Medel  MRN: [id number]  Patient Class: IP- Inpatient   Admission Date: 7/3/2019  Length of Stay: 5 days  Attending Physician: Gladis Cordero*  Primary Care Provider: Carlos A Caputo MD    [facility] Medicine Team: [facility]P MED R Gladis Cordero MD    Subjective:     Principal Problem:SDH (subdural hematoma)      HPI:  Per transferring physician:   The patient is a 58 year old female with PMHx of pulmonary sarcoidosis on 3-5L home oxygen(on prednisone), Class 3 HFrEF (3/2019 EF 35%), perm Afib/flutter s/p 6/2017 AVN ablation & PPM, ESRD (HD TTS-last dialysis yesterday), Type 2 DM (5/30/19 A1c 6.5%), pulmonary HTN, YOANDY, orthostatic hypotension, seizures (on keppra 500 mg Bid),  wheel chair bound admitted to [facility] s/p fall from wheel chair with right SDH. Patient's sister at bedside stated that patient's  fiance was was pushing her down a wheelchair ramp when they lost control and the patient fell off the side of the ramp out of her wheelchair into the grass. Pt reports brief LOC and complaining of  headache. Patient was at home on warfarin (for Afib/flutter) and Plavix. CT head reveals development of a thin hyperdense extra-axial collection overlying the right tentorium cerebella most compatible with acute subdural hemorrhage.  No significant mass effect or midline shift. Pending repeat CTH in 6 hours.  Kcentra, DDAVP and vitamin K initiated. NSGY following. Patient admitted to [facility] for close monitoring and higher level of care.      Of note patient has had frequent hospitalizations in the recent past. These admissions are typically due to volume overload, CHF exacerbation, and shortness of breath. She follows with pulmonology as an outpatient and is on chronic steroids. Per last outpatient note from Dr. Altamirano on 5/23/19, continued on prednisone 20mg daily for sarcoid as she has had difficulty and increased  "hospitalizations on lower doses.    Overview/Hospital Course:  Trauma survey done showed no acute pelvic, knee or femur fractures. She had bilateral proximal 4th toe fractures which is being managed by Podiatry. She underwent a rpt CTH which was relatively stable. Plans are for rpt CTH on 7/8. Last dialyzed yesterday. Continue holding warfarin. Continue neuro checks.     Interval History: Patient seen and examined at bedside. No acute issues overnight. On evaluation, emotionally distressed and teary. Complaining of painful IV in R forearm. Hematoma and swelling observed. Also teary about potential upcoming discharge (in a few days). "I'm not ready". Liver enzymes are elevated; US, infectious panels ordered.     Review of Systems   Constitutional: Positive for activity change and fatigue.   HENT: Negative.    Eyes: Negative.    Respiratory: Positive for shortness of breath (chronic).         On home oxygen / O2 via nasal cannula   Cardiovascular: Negative.    Gastrointestinal: Negative.  Negative for constipation, diarrhea, nausea and vomiting.   Endocrine: Negative.    Genitourinary: Negative.    Musculoskeletal:        R forearm pain and numbness   Skin: Positive for color change (hematoma).   Allergic/Immunologic: Negative.    Neurological: Positive for weakness.   Psychiatric/Behavioral: The patient is nervous/anxious.      Objective:     Vital Signs (Most Recent):  Temp: 97.5 °F (36.4 °C) (07/08/19 1339)  Pulse: 69 (07/08/19 1415)  Resp: 18 (07/08/19 1339)  BP: 120/65 (07/08/19 1415)  SpO2: 96 % (07/08/19 1202) Vital Signs (24h Range):  Temp:  [96.1 °F (35.6 °C)-98 °F (36.7 °C)] 97.5 °F (36.4 °C)  Pulse:  [68-89] 69  Resp:  [14-20] 18  SpO2:  [94 %-98 %] 96 %  BP: (103-129)/(65-85) 120/65     Weight: 110 kg (242 lb 8.1 oz)  Body mass index is 36.87 kg/m².    Intake/Output Summary (Last 24 hours) at 7/8/2019 1430  Last data filed at 7/8/2019 0845  Gross per 24 hour   Intake 597.42 ml   Output 0 ml   Net 597.42 ml "      Physical Exam   Constitutional: She is oriented to person, place, and time. She appears well-developed and well-nourished.   obese   HENT:   Head: Normocephalic.   Eyes: Pupils are equal, round, and reactive to light. Conjunctivae and EOM are normal. Right eye exhibits no discharge. Left eye exhibits no discharge. No scleral icterus.   Cardiovascular: Normal rate and regular rhythm.   Pulmonary/Chest: Effort normal. Tachypnea noted. She has rhonchi.   On oxygen via nasal cannula   Abdominal: Soft.   Musculoskeletal: Normal range of motion. She exhibits no edema.   Neurological: She is alert and oriented to person, place, and time. She displays normal reflexes. No cranial nerve deficit or sensory deficit. She exhibits normal muscle tone. Coordination normal.   Psychiatric: Judgment normal. Her affect is labile. She exhibits a depressed mood.     Significant Labs:   CBC:   Recent Labs   Lab 07/07/19  0142 07/08/19  0902   WBC 18.03* 17.27*   HGB 7.0* 7.4*   HCT 23.9* 25.1*    295     CMP:   Recent Labs   Lab 07/07/19  0142 07/08/19  0902   * 131*   K 4.7 5.8*   CL 97 94*   CO2 21* 16*   * 104   BUN 28* 53*   CREATININE 3.9* 6.4*   CALCIUM 8.5* 8.4*   PROT 6.9 7.1   ALBUMIN 3.0* 3.0*   BILITOT 1.7* 2.7*   ALKPHOS 257* 407*   AST 86* 157*   ALT 83* 145*   ANIONGAP 16 21*   EGFRNONAA 12.1* 6.6*       Significant Imaging: I have reviewed and interpreted all pertinent imaging results/findings within the past 24 hours.      Assessment/Plan:      SDH (subdural hematoma)  --  Continue Neuro checks  --  Neurosurgery consulted-no surg intevention  -- CT Head reveals--Interval development of a thin hyperdense extra-axial collection overlying the right tentorium cerebella most compatible with acute subdural hemorrhage.  No significant mass effect or midline shift.  --  repeat CTH stable  --  Pt was on Coumadin and Plavix at home, INR on admit 3.4, holding AC  --  follow PT INR daily  --  PCC, DDAVP and  vit. K received  --  PT/OT/SLP           --  SBP goal <140, MAP >60            -- Repeat CTH 7/8 stable; plan to resume anticoagulation and repeat CTH when INR at goal. Warfarin 5mg qT/Th/Sa and 2.5mg all other days ordered.                  Obese  Nutrition recs  Diet modification and counseling  Stroke prevention     Fall at home, initial encounter  --s/p fall at home from wheelchair  --XR alia pelvis-No acute displaced fracture-dislocation identified.  --XR L femur-Visualized osseous structures appear intact, with no definite evidence of recent fracture or other significant abnormality identified.  -XR L knee-No evidence of recent fracture or other significant detrimental interval change since the examinations referenced above.  Old healed left proximal fibular shaft fracture is noted.   --b/l 4th toe fractures, managing with darco shoe, per podiatry.  --fall precautions  --PT/OT, OOB. Recommending  PT/OT but patient appears to have deconditioned further over the weekend. Also concern about fall risk if discharged home. Will ask for re-evaluation given changes and consideration for SNF/IPR. Discussed with PT today.     Transaminitis  --present on admission  --trending up, and new.  --liver U/S, hep panel, HIV ordered     Anemia in ESRD (end-stage renal disease)      -H&H stable on admit. Trending down to 7 today.  Oozing, bleeding noted from RUE wound. Elevated INR noted. Nursing instructed to place pressure bandage. No other overt bleeding.   - 1u pRBC ordered 7/7, but was unable to complete due to possible infiltration/pain in IV and inability to establish other access.   - Will consult PICC team.  -follow CBC/ CMP daily     Paroxysmal A-fib  --pt at home on warfarin- held on admission due to ICH.  --restarted on 7/8.     Ulcer of lower limb  -- various stages of bilateral lower legs wounds  -- Podiatry consulted-appreciate recs     Orthostatic hypotension  continue home med Midodrine  -map goal >60     ESRD  on hemodialysis  Hyperkalemia  --ESRD on HD, fistula left upper arm, last dialysis Tuesday (7/2).   --nephrology consulted- appreciate reccs  -- Short course of dialysis with minimal to no fluid removal 7/4  -- hyperkalemic 7/8; to HD today.     Biventricular cardiac pacemaker in situ  Biventricular cardiac pacemaker in situ  -pacemaker     Weakness generalized   PT/OT  Fall precautions     Hyperlipidemia  --cont home med atorvastatin     Epilepsy  --Hx of seizures for which she is on keppra at home  --continue Keppra 500 mg BID  --Seizure precautions    VTE Risk Mitigation (From admission, onward)        Ordered     warfarin (COUMADIN) tablet 2.5 mg  Every Sunday 07/08/19 1429     warfarin (COUMADIN) tablet 5 mg  Every Tues, Thurs, Sat      07/08/19 1429     warfarin (COUMADIN) tablet 2.5 mg  Every Mon, Wed, Fri 07/08/19 1429     heparin (porcine) injection 5,000 Units  Every 8 hours      07/04/19 9084                Gladis Cordero MD  Department of Hospital Medicine   [facility]

## 2019-07-08 NOTE — PHYSICIAN QUERY
"  PT Name: Sisi Medel  MR #: [id number]    Physician Query Form - Integumentary System Clarification      CDS/: Daphne Vargas RN, CDS               Contact information:  [email or web address]    This form is a permanent document in the medical record.     Query Date: July 8, 2019    By submitting this query, we are merely seeking further clarification of documentation. Please utilize your independent clinical judgment when addressing the question(s) below.    The Medical Record contains the following:   Indicator   Supporting Clinical Findings Location in Medical Record    "Redness" documented     x "Ulcer" documented --Ulcer of lower limb           - various stages of bilateral lower legs wounds     -- Ulcer of lower limb          -Right plantar heel wound, calluses on b/l halluxes with partial  thickness ulcer on left  --Partial thickness ulcer to left great toe with hyperkeratotic covering  -- Ulcer Location: right plantar heel    Measurements:2.0x0.4x0.1    Periwound: viable    Drainage: serosanguinous.    Base:  100% granular. 0% fibrin.    Signs of infection: None.   [facility] H&P 7/3      Podiatry c/s 7/4    Wound care consult      Medication:     x Treatment: -Wounds dressed with iodosorb and aquacel boarder dressing, cast padding and coban, 4x4's, kerlix, and ace.  Nursing to do dressing changes with medihoney and aquacel boarders.   Podiatry c/s 7/4   x Other:  --ESRD on HD, HLD, perm Afib/flutter s/p 6/2017 AVN ablation & PPM    --Chronic combined systolic and diastolic heart failure, Diabetes mellitus type II, controlled, Peripheral neuropathy   [facility] H&P       Podiatry c/s 7/4     Provider, Please specify the diagnosis or diagnoses associated with above clinical findings.  Skin ulcer secondary to (check all that apply):   [ x  ] Diabetes   [ x  ] Neuropathy   [   ] Other _____________________________   [   ]  Clinically Undetermined     Please document in your progress notes daily " for the duration of treatment, until resolved, and include in your discharge summary.

## 2019-07-08 NOTE — NURSING
Received report from Ma in dialysis, pt ran for 4 hours, took off 3 liters.  Pt is expected to be dialyzed again tomorrow according to dialysis nurse.  Awaiting pt to return back from dialysis.

## 2019-07-08 NOTE — PT/OT/SLP PROGRESS
Occupational Therapy   Treatment    Name: Sisi Medel  MRN: [id number]  Admitting Diagnosis:  SDH (subdural hematoma)       Recommendations:     Discharge Recommendations: home health OT  Discharge Equipment Recommendations:  (bariatric bedside commode and TTB )  Barriers to discharge:  None    Assessment:     Sisi Medel is a 58 y.o. female with a medical diagnosis of SDH (subdural hematoma).  She presents with the impairments listed below. Pt displays global deconditioning requiring increased A to perform ADLs and functional mobility. Pt's pain in her R UE, SOB, and decreased activity tolerance limit her the most during OT on this day. Pt would benefit from continued acute OT services to improve indep and safety with ADLs, mobility, and resume PLOF.     Performance deficits affecting function are weakness, impaired endurance, impaired self care skills, impaired functional mobilty, gait instability, impaired balance, decreased lower extremity function, decreased upper extremity function, pain, decreased ROM, impaired cardiopulmonary response to activity, impaired skin, orthopedic precautions.     Rehab Prognosis:  Good; patient would benefit from acute skilled OT services to address these deficits and reach maximum level of function.       Plan:     Patient to be seen 4 x/week to address the above listed problems via self-care/home management, therapeutic activities, therapeutic exercises  · Plan of Care Expires: 08/02/19  · Plan of Care Reviewed with: patient    Subjective     Pain/Comfort:  Pain Rating 1: (Pain not rated but reported pain in R UE )  Location - Side 1: Right  Location - Orientation 1: generalized  Location 1: arm  Pain Addressed 1: Reposition, Distraction    Objective:     Communicated with: RN and patient prior to session.  Patient found sitting EOB eating breakfast with oxygen, telemetry upon OT entry to room.    General Precautions: Standard, fall   Orthopedic  Precautions:(WBAT RLE ( B darco shoes in room))   Braces:       Occupational Performance:     Bed Mobility:    · Patient completed Scooting to EOB with stand by assistance     Functional Mobility/Transfers:  · Patient completed Sit <> Stand Transfer with moderate assistance  with  rolling walker   · Patient completed Bed <> Chair Transfer using Step Transfer technique with minimum assistance with rolling walker  · Functional Mobility: Pt ambulated 4 steps from EOB to bedside chair with min A and RW.     Pt performed sit>stand 1st trail with increased dizziness, generalized weakness, and SOB. Pt required resting break to regain stability and purse lip breathing. [place] 2 pt t/f EOB to bedside chair without symptoms except SOB. Pt exhibited SOB throughout session, increased with activity.     Activities of Daily Living:  · Feeding:  Set up assistance   · Upper Body Dressing: moderate assistance to don gown around back sitting EOB  · Lower Body Dressing: maximal assistance to don B darco shoes EOB unsupported      Bleeding from peripheral IV in R elbow noted. RN notified and did not express concern stating she was previously aware.     [facility] 6 Click ADL: 16    Treatment & Education:  Pt performed  UE AROM including: shld flex and elbow ext/flex (L UE> R UE), scapula squeezes x 15, and neck ROM including flex/ext, lateral flex, and retraction   Pt was given a stress ball and performed gross grasp squeezes in B hands x 20  Pt edu on POC/ role of OT  Pt du on OOB activity and sitting up in bedside chair  Pt edu on energy conservation tech including purse breathing tech  Pt edu on HEP for UR ROM and using stress ball to increase strength and function     Patient left up in chair with call button in reach and RN notifiedEducation:      GOALS:   Multidisciplinary Problems     Occupational Therapy Goals        Problem: Occupational Therapy Goal    Goal Priority Disciplines Outcome Interventions   Occupational Therapy Goal      OT, PT/OT Ongoing (interventions implemented as appropriate)    Description:  Goals to be met by: 7/14/19     Patient will increase functional independence with ADLs by performing:    UE Dressing with Stand-by Assistance. Met 7/5  Revised: UE dressing with supervision..  Grooming while seated with Supervision.  Toileting from bedside commode with Minimal Assistance for hygiene and clothing management.   Supine to sit with Supervision.  Toilet transfer to bedside commode with Contact Guard Assistance.                       Time Tracking:     OT Date of Treatment: 07/08/19  OT Start Time: 0907  OT Stop Time: 0940  OT Total Time (min): 33 min    Billable Minutes:Therapeutic Activity 15  Therapeutic Exercise 18    ERIC Jones  7/8/2019

## 2019-07-08 NOTE — PHYSICIAN QUERY
PT Name: Sisi Medel  MR #: [id number]    Physician Query Form - Atrial Fibrillation Specificity     CDS/: Daphne Vargas RN, CDS               Contact information: [email or web address]     This form is a permanent document in the medical record.     Query Date: July 8, 2019    By submitting this query, we are merely seeking further clarification of documentation. Please utilize your independent clinical judgment when addressing the question(s) below.    The medical record contains the following:   Indicators     Supporting Clinical Findings Location in Medical Record   x Atrial Fibrillation --History of present Illness: perm Afib/flutter s/p 6/2017 AVN ablation & PPM    --Paroxysmal A-fib            -pt at home on warfarin-            -hold anticoagulation for now    [facility] H&P 7/3->Neuro Sx Note 7/8    [facility] H&P 7/3-> Hosp med Note 7/8   x EKG results --Ventricular-paced rhythm with frequent Premature ventricular complexes  --Biventricular hypertrophy  --Atrial fibrillation present  --Vent. Rate : 077 BPM     Atrial Rate : 046 BPM   EKG 7/3   x Medication -pt at home on warfarin [facility] H&P 7/3-> Hosp med Note 7/8     x Treatment - s/p 6/2017 AVN ablation & PPM [facility] H&P 7/3->Neuro Sx Note 7/8      Other         Provider, please further clarify the Atrial Fibrillation diagnosis.    [  x] Paroxysmal   [  ] Permanent   [  ] Other (please specify):   [  ] Clinically Undetermined       Please document in your progress notes daily for the duration of treatment until resolved, and include in your discharge summary.

## 2019-07-08 NOTE — ASSESSMENT & PLAN NOTE
58 F with pulmonary sarcoidosis on 3-5L home oxygen, Class 3 HFrEF (3/2019 EF 35%), perm Afib/flutter s/p 6/2017 AVN ablation & PPM, ESRD (TTS HD via AVF, unknown baseline Cr), Type 2 DM (3/2019 A1c 6.2%), pulmonary HTN, YOANDY, and orthostatic hypotension that presented to ED s/p fall from wheelchair with acute R SDH. Given K-Centra in ED to reverse AC/APT.    Today's repeat CT head was independently reviewed.  SDH is stable in size.  OK to resume anticoagulation from neurosurgical perspective.  Please avoid loading dose.  Recommend to obtain repeat CT head when INR is therapeutic.   - Neurologically stable  - No acute neurosurgical intervention necessary at this time  - SQH  - PT/OT. OOB x 6 hours  - SBP <160 mmHg  - Na 135-145  - Keppra 500 BID x 2 weeks  - Remainder of medical management per primary team     Neurosurgery will sign off at this time but we are available for any questions or concerns.  Please obtain repeat CT Head when INR is therapeutic and reconsult Neurosurgery at that time.
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
## 2019-07-08 NOTE — PHYSICIAN QUERY
PT Name: Sisi Medel  MR #: [id number]    Physician Query Form - Cause and Effect Relationship Clarification      CDS/: Daphne Vargas RN, CDS               Contact information: [email or web address]    This form is a permanent document in the medical record.     Query Date: July 8, 2019    By submitting this query, we are merely seeking further clarification of documentation. Please utilize your independent clinical judgment when addressing the question(s) below.    The Medical record contains the following:  Supporting Clinical Findings   Location in record        --SDH s/p fall from wheel chair                                                                    -CT head reveals development of a thin hyperdense extra-axial collection overlying the right tentorium cerebella most compatible with acute subdural hemorrhage.                                               -Kcentra, DDAVP and vitamin K initiated                                NCC H&P 7/3                                                                       --Pt was on Coumadin and Plavix at home, INR on admit 3.4                                                                                                                        NCC H&P 7/3         Provider, please clarify if there is any correlation between __SDH____ and _Coumadin and/or Plavix ___.           Are the conditions:      [  ] Due to or associated with each other   [  ] Unrelated to each other   [ x ] Other (Please Specify): _associated with fall. ________________________   [  ] Clinically Undetermined

## 2019-07-08 NOTE — PLAN OF CARE
Problem: Adult Inpatient Plan of Care  Goal: Plan of Care Review  Outcome: Ongoing (interventions implemented as appropriate)  Dialysis completed, needles removed from RAISSA AVG with pressure held for 10min with hemostasis achieved. Gauze and tape applied,positive thrill and bruit.Dialyzed for 4hrs with fluid removal of 3L. Pt tolerated well. Report given to DEXTER Giang

## 2019-07-09 NOTE — PT/OT/SLP PROGRESS
Occupational Therapy      Patient Name:  Sisi Medel   MRN:  [id number]    Patient not seen today secondary to Other (Comment)(Pt unable to participate in OT session d/t dialysis. Will follow up as able).     Dalia Lofton OT  7/9/2019
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
## 2019-07-09 NOTE — SUBJECTIVE & OBJECTIVE
Interval History: Patient seen and examined at bedside. No acute issues overnight. Appearing more calm on examination today. Lying in bed, in NAD. Labs to be drawn during HD. Arm feels better. PT recommending SNF; have asked SW to begin process.     Review of Systems   Constitutional: Positive for activity change and fatigue.   HENT: Negative.    Eyes: Negative.    Respiratory: Positive for shortness of breath (chronic).         On home oxygen / O2 via nasal cannula   Cardiovascular: Negative.    Gastrointestinal: Negative.  Negative for constipation, diarrhea, nausea and vomiting.   Endocrine: Negative.    Genitourinary: Negative.    Musculoskeletal:        R forearm pain and numbness   Skin: Positive for color change (hematoma).   Allergic/Immunologic: Negative.    Neurological: Positive for weakness.   Psychiatric/Behavioral: The patient is nervous/anxious.      Objective:     Vital Signs (Most Recent):  Temp: 97 °F (36.1 °C) (07/09/19 1155)  Pulse: 74 (07/09/19 1415)  Resp: 18 (07/09/19 1213)  BP: (!) 95/55 (07/09/19 1415)  SpO2: 96 % (07/09/19 0331) Vital Signs (24h Range):  Temp:  [97 °F (36.1 °C)-97.5 °F (36.4 °C)] 97 °F (36.1 °C)  Pulse:  [53-76] 74  Resp:  [12-20] 18  SpO2:  [92 %-96 %] 96 %  BP: ()/(35-78) 95/55     Weight: 110 kg (242 lb 8.1 oz)  Body mass index is 36.87 kg/m².    Intake/Output Summary (Last 24 hours) at 7/9/2019 1430  Last data filed at 7/8/2019 1807  Gross per 24 hour   Intake 700 ml   Output 3704 ml   Net -3004 ml      Physical Exam   Constitutional: She is oriented to person, place, and time. She appears well-developed and well-nourished.   obese   HENT:   Head: Normocephalic.   Eyes: Pupils are equal, round, and reactive to light. Conjunctivae and EOM are normal. Right eye exhibits no discharge. Left eye exhibits no discharge. No scleral icterus.   Cardiovascular: Normal rate and regular rhythm.   Pulmonary/Chest: Effort normal. Tachypnea noted. She has rhonchi.   On oxygen via  nasal cannula   Abdominal: Soft.   Musculoskeletal: Normal range of motion. She exhibits no edema.   Neurological: She is alert and oriented to person, place, and time. She displays normal reflexes. No cranial nerve deficit or sensory deficit. She exhibits normal muscle tone. Coordination normal.   Psychiatric: Judgment normal. Her affect is labile. She exhibits a depressed mood.     Significant Labs:   CBC:   Recent Labs   Lab 07/08/19  0902   WBC 17.27*   HGB 7.4*   HCT 25.1*        CMP:   Recent Labs   Lab 07/08/19  0902   *   K 5.8*   CL 94*   CO2 16*      BUN 53*   CREATININE 6.4*   CALCIUM 8.4*   PROT 7.1   ALBUMIN 3.0*   BILITOT 2.7*   ALKPHOS 407*   *   *   ANIONGAP 21*   EGFRNONAA 6.6*       Significant Imaging: I have reviewed and interpreted all pertinent imaging results/findings within the past 24 hours.

## 2019-07-09 NOTE — NURSING
Unable to give last two medication to pt, Sevelamer 800 mg and Coumadin 2.5 mg for 5pm dosage due to pt not available.  Medication was handed off to night nurse as pt is still not back on unit as of yet from Hemodialysis.

## 2019-07-09 NOTE — PROGRESS NOTES
Maintenance hemodialysis tx started via RAISSA AVG, tolerated well, flows good. Dialysis days are T,TH,Sat. Daily labs drawn and  called to

## 2019-07-09 NOTE — PLAN OF CARE
07/09/19 1459   Discharge Reassessment   Assessment Type Discharge Planning Reassessment   Provided patient/caregiver education on the expected discharge date and the discharge plan Yes   Do you have any problems affording any of your prescribed medications? No   Discharge Plan A Skilled Nursing Facility   Discharge Plan B Home Health   DME Needed Upon Discharge  none   Anticipated Discharge Disposition SNF   Can the patient answer the patient profile reliably? Yes, cognitively intact   How does the patient rate their overall health at the present time? Fair   Describe the patient's ability to walk at the present time. Walks with the help of equipment   How often would a person be available to care for the patient? Occasionally   Number of comorbid conditions (as recorded on the chart) Five or more   Post-Acute Status   Post-Acute Authorization Placement   Post-Acute Placement Status Referrals Sent

## 2019-07-09 NOTE — PROGRESS NOTES
[facility] HEMODIALYSIS NOTE     Patient currently on hemodialysis for removal of uremic toxins and volume.     Patient seen and evaluated on hemodialysis, tolerating treatment, see HD flowsheet for vitals and assessments.      Ultrafiltration goal is 0.5-1L as tolerated (pre-HD weight today is 108.7kg, DW is 109kg), keep map >65     Labs have been reviewed and the dialysate bath has been adjusted.     Assessment/Plan:    -Patient seen on HD, tolerating treatment well, w/o complaints   -Reviewed outpatient HD records   -Renal diet  -Strict I/O's and daily weights  -Daily renal function panels  -Continue renvela with meals   -Patient receives hectorol outpatient, will convert to zemplar and start today   -Will restart outpatient dose of Epo  -Will administer patient's weekly dose of venofer with treatment today  -Will continue to follow while inpatient       AYAN Levine, AGNP-C  Nephrology  Pager:  [phone]

## 2019-07-09 NOTE — PHYSICIAN QUERY
"PT Name: Sisi Medel  MR #: [id number]    Physician Query Form - Hematology Clarification      CDS/: Daphne Vargas RN, CDS              Contact information: [email or web address]    This form is a permanent document in the medical record.      Query Date: July 9, 2019    By submitting this query, we are merely seeking further clarification of documentation. Please utilize your independent clinical judgment when addressing the question(s) below.    The Medical record contains the following:   Indicators  Supporting Clinical Findings Location in Medical Record   x "Anemia" documented -- Anemia in ESRD (end-stage renal disease)           -H&H stable on admit          -follow CMP daily     --Anemia in ESRD (end-stage renal disease)        -H&H stable on admit. Trending down to 7 today.          -Will transfuse  1u RBC. Oozing, bleeding noted from RUE wound.        -Elevated INR noted.   H&P 7/3         Hosp Med Note 7/7   x H & H = --H/H: 7.9/26.9->7.2/25.5->7/23.9->7.4/25.1   Labs 7/3->7/5->7/7->7/8    BP =                     HR=      "GI bleeding" documented     x Acute bleeding (Non GI site) --SDH s/p fall from wheel chair              -CT Head reveals--Interval development of a thin hyperdense extra-axial collection overlying the right tentorium cerebella most compatible with acute subdural hemorrhage.           -Pt was on Coumadin and Plavix at home, INR on admit 3.4     H&P 7/3   x Transfusion(s) --- 1u pRBC ordered 7/7, but was unable to complete due to possible infiltration/pain in IV and inability to establish other access.    HOsp Med Note 7/8   x Treatment: --Patient receives venofer and Epo outpatient T/Th/Sat, will restart for T/Th/Sat treatments    Nephrology Note 7/8    Other:      Provider, please specify diagnosis or diagnoses associated with above clinical findings.      Please specify if an additional anemia diagnosis is associated with the above indicators:    [ x ] Acute " blood loss anemia   [  ] Precipitous drop in Hematocrit     [  ] Anemia of chronic disease, only      [  ] Other Hematological Diagnosis (please specify):     [  ] Clinically Undetermined       Please document in your progress notes daily for the duration of treatment, until resolved, and include in your discharge summary.

## 2019-07-09 NOTE — PT/OT/SLP PROGRESS
"Physical Therapy Treatment    Patient Name:  Sisi Medel   MRN:  [id number]    Recommendations:     Discharge Recommendations:  nursing facility, skilled(To decrease fall risk)   Discharge Equipment Recommendations: (bariatric BSC and bath bench)   Barriers to discharge: None    Assessment:     Sisi Medel is a 58 y.o. female admitted with a medical diagnosis of SDH (subdural hematoma).  She presents with the following impairments/functional limitations:  weakness, impaired endurance, impaired self care skills, impaired functional mobilty, impaired balance, gait instability, pain.  Ms. Medel was able to perform supine>sit with modified independence using her bedrail and perform a stand pivot transfer with CGA, though she did demonstrate a quick stand>sit transfer, denoting weakness.  She will benefit from continued PT services via acute care as well as skilled nursing facility therapy services to focus on decreasing her fall risk, as she returned home and fell out of her wheelchair while descending her ramp.    Rehab Prognosis: Fair; patient would benefit from acute skilled PT services to address these deficits and reach maximum level of function.    Recent Surgery: * No surgery found *      Plan:     During this hospitalization, patient to be seen 4 x/week to address the identified rehab impairments via gait training, therapeutic activities, therapeutic exercises and progress toward the following goals:    · Plan of Care Expires:  08/03/19    Subjective     Chief Complaint: "My back hurts, and I'm having a panic attack." Initially not willing to partake in therapy, but after therapeutic listening, conversation, and education about the importance of out-of-bed activity, pt was willing to get out of bed.  Pt enjoys praying with someone when feeling highly anxious.   Pt thanked me for helping her and reported feeling better when session was over.  Patient/Family Comments/goals: To get to the " bedside commode to have a bowel movement.  Pain/Comfort:  · Pain Rating 1: 5/10  · Location - Side 1: Bilateral  · Location - Orientation 1: lower  · Location 1: back  · Pain Addressed 1: Reposition  · Pain Rating Post-Intervention 1: 0/10      Objective:     Communicated with nurse prior to session.  Patient found HOB elevated , crouched over toward her L side due to anxiety with telemetry, oxygen(3L) upon PT entry to room.     General Precautions: Standard, fall   Orthopedic Precautions:RLE weight bearing as tolerated   Braces: (B DARCO shoes) - donned by PT today at edge of bed    Functional Mobility:  · Bed Mobility:  Supine to Sit: modified independence with use of bedrails  · Transfers:  Sit to Stand:  contact guard assistance with upper extremity support on bedside commode  · Bed to bedside commode: contact guard assistance with  upper extremity supported on bedside commode  using  Stand Pivot with quick descent via stand>sit.  · Balance: [place] with sitting balance. Standing balance requires UE support and CGA-Jj.      AM-PAC 6 CLICK MOBILITY  Turning over in bed (including adjusting bedclothes, sheets and blankets)?: 4  Sitting down on and standing up from a chair with arms (e.g., wheelchair, bedside commode, etc.): 3  Moving from lying on back to sitting on the side of the bed?: 4  Moving to and from a bed to a chair (including a wheelchair)?: 3  Need to walk in hospital room?: 3  Climbing 3-5 steps with a railing?: 2  Basic Mobility Total Score: 19       Therapeutic Activities and Exercises:  Pt was initially unwilling to partake in therapy due to panic attack. I gave her  on the situation and was able to see her into a calm state. Pt was willing to sit up at the side of the bed thereafter. After doing so, she flexed forward fully, resting her arms on one of her legs, as she c/o back pain. She then asked to use the restroom. I got her a BSC, she transferred with CGA as noted above. Stayed  on BSC upon PT exit with call button in reach and PCT aware, encountered pt prior to PT exit.    White board updated; pt encouraged to keep moving and remain positive.    Patient left on bedside commode, BM with call button in reach and PCT notified..    GOALS:   Multidisciplinary Problems     Physical Therapy Goals        Problem: Physical Therapy Goal    Goal Priority Disciplines Outcome Goal Variances Interventions   Physical Therapy Goal     PT, PT/OT Ongoing (interventions implemented as appropriate)     Description:  Goals to be met by: 2019     Patient will increase functional independence with mobility by performin. Supine to sit with Modified [place]. Met (2019)  2. Sit to supine with supervision  3. Sit to stand transfer with Contact Guard Assistance with RW and maintaining any weight bearing precautions- Met   Sit to stand transfer with supervision assistance with RW from bedside chair.  4. Bed to chair transfer with Contact Guard Assistance using Rolling Walker and maintaining any weight bearing precautions. Met (2019)  4B. Bed to w/c transfer using sliding board and maintaining any weight bearing precautions w/ min assist  5. Gait  x 30 feet with Contact Guard Assistance using Rolling Walker. and maintaining any weight bearing precautions  6. Lower extremity exercise program x20 reps per handout, with assistance as needed                         Time Tracking:     PT Received On: 19  PT Start Time: 1009     PT Stop Time: 1035  PT Total Time (min): 26 min     Billable Minutes: Therapeutic Activity 26    Treatment Type: Treatment  PT/PTA: PT     PTA Visit Number: 0     Stephany Kessler, PT  2019

## 2019-07-09 NOTE — PLAN OF CARE
Problem: Physical Therapy Goal  Goal: Physical Therapy Goal  Goals to be met by: 2019     Patient will increase functional independence with mobility by performin. Supine to sit with Modified [place]. Met (2019)  2. Sit to supine with supervision  3. Sit to stand transfer with Contact Guard Assistance with RW and maintaining any weight bearing precautions- Met   Sit to stand transfer with supervision assistance with RW from bedside chair.  4. Bed to chair transfer with Contact Guard Assistance using Rolling Walker and maintaining any weight bearing precautions. Met (2019)  4B. Bed to w/c transfer using sliding board and maintaining any weight bearing precautions w/ min assist  5. Gait  x 30 feet with Contact Guard Assistance using Rolling Walker. and maintaining any weight bearing precautions  6. Lower extremity exercise program x20 reps per handout, with assistance as needed       Outcome: Ongoing (interventions implemented as appropriate)  Met two goals today.

## 2019-07-09 NOTE — TELEPHONE ENCOUNTER
----- Message from Adam Patel MA sent at 7/8/2019  2:29 PM CDT -----  Torin Wilcox would like this patient be seen with  4-6 Wk F/U  Aug 13 or 20 (125% & 135%)   ----- Message -----  From: Jeri Rebolledo PA-C  Sent: 7/8/2019   1:58 PM  To: Maegan REDDY Staff    Please arrange follow-up with Dr. Issa in 4-6 weeks with a repeat CT head. Thank you!

## 2019-07-10 NOTE — ASSESSMENT & PLAN NOTE
The patient is a 58 year old female with PMHx of pulmonary sarcoidosis on 3-5L home oxygen, and prednisone,HFrEF (3/2019 EF 35%), perm Afib/flutter s/p 6/2017 AVN ablation & PPM, ESRD on HD, T2DM, pulmonary HTN, YOANDY, orthostatic hypotension, seizures on keppra, who is presenting to the hospital with mechanical fall and SDH; hepatology consulted for rising LFTs since admission.    Has had prior transient transaminase elevations but with intervening periods of normal LFT. Normal albumin and platelets and imaging without features of portal hypertension therefore unlikely underlying chronic liver disease. Likely etiology of her abnormal LFT is due to right heart failure and congestive hepatopathy. Examination consistent with fluid overload. Recent TTE (3/2019) with worsening right heart function vs 8/2018. Negative acute viral hepatitis panel. No medications suggestive of DILI. No significant hypotensive episodes.     Plan  - recommend cardiology consultation ?right heart failure. ?benefit from treatment of her pulmonary hypertension  - will send additional auto-immune serologies  - continue to trend CMP

## 2019-07-10 NOTE — SUBJECTIVE & OBJECTIVE
Past Medical History:   Diagnosis Date    Anemia in ESRD (end-stage renal disease) 3/7/2016    Anticoagulant long-term use     Cervical radiculopathy 2015    CHF (congestive heart failure)     Chronic combined systolic and diastolic heart failure 2013    EF 20% , improved with Medical therapy, negative PET     Chronic respiratory failure with hypoxia 2013    On home oxygen at 2-5 liters    Closed fracture of proximal end of right fibula 2016    Complications due to renal dialysis device, implant, and graft     DDD (degenerative disc disease), lumbar 2015    Dependence on renal dialysis     Diabetes mellitus type II, controlled 2017    ESRD on hemodialysis 2016    Essential hypertension     Gout     Lateral meniscal tear 2016    Lumbar stenosis 2015    Obesity, Class III, BMI 40-49.9 (morbid obesity)     Pacemaker     Paroxysmal atrial fibrillation 2014    Not on anticoagulation    Peripheral neuropathy 2013    Personal history of gastric ulcer 3/19/2013    Pneumonia of left lower lobe due to infectious organism 3/10/2019    Sarcoidosis, lung 2009    Diagnosed in  with ocular manifestation, on 4L home O2 and PO steroids    Secondary pulmonary hypertension 2015    Seizure disorder 3/19/2013    Shingles 2013    Thyroid disease        Past Surgical History:   Procedure Laterality Date    ABDOMINAL SURGERY      ABLATION N/A 2017    [facility] C[facility] LAB    ANGIOPLASTY Left 1/10/2018    Performed by Torrey [facility]afana [facility] at [facility] OR 2ND FLR    ANGIOPLASTY-PERCUTANEOUS TRANSLUMINAL (PTA) Left 2017    Performed by [facility] at [facility] OR 2ND FLR    ANGIOPLASTY-PERCUTANEOUS TRANSLUMINAL (PTA) Left 10/12/2016    Performed by [facility] at [facility] OR 2ND FLR    CARDIAC PACEMAKER PLACEMENT       SECTION      x2    DECLOT GRAFT PERCUTANEOUS Left  2/7/2017    Per[facility] at [facility] OR 2ND FLR    DECLOT-GRAFT Left 6/21/2016    Performed by Harjit Starr MD at [facility] CATH LAB    DECLOT-GRAFT Left 6/20/2016    Performed by [facility]it Starr [facility] at [facility] CATH LAB    ECHOCARDIOGRAM-TRANSESOPHAGEAL N/A 6/27/2013    Performed by Delmy Surgeon at [facility] DELMY    Fistulogram Left 4/8/2019    Performed by [facility] at [facility] CATH LAB    fistulogram Left 1/10/2018    Performed by Tor[facility] MD at [facility] OR 2ND FLR    FISTULOGRAM Left 9/13/2017    Performed by Tor[facility]ana MD at [facility] CATH LAB    FISTULOGRAM Left 10/12/2016    Performed by [facility]ana MD at [facility] OR 2ND FLR    FISTULOGRAM Left 6/21/2016    Performed by Harjit Starr MD at [facility] CATH LAB    Fistulogram, LUE AVG, possible intervention Left 1/30/2019    Performed by Tor[facility]ana [facility] at [facility] CATH LAB    Fistulogram, OR 11 Left 5/8/2019    Performed by Torery Villafana MD at [facility] OR 2ND FLR    INJECTION-STEROID-EPIDURAL-TRANSFORAMINAL Right 7/20/2015    Performed by [facility] at [facility] PAIN MGT    INJECTION-STEROID-EPIDURAL-TRANSFORAMINAL Right 5/21/2015    Performed by [facility] at [facility] PAIN MGT    [id number] / Left upper AV graft. Left 2/3/2016    Performed by [facility] at [facility] OR 2ND FLR    [id number]-[id number] N/A 1/11/2017    Performed by Bladimir Adorno MD at [facility] CATH LAB    OTHER SURGICAL HISTORY      loop recorder implant    PLACEMENT-STENT Left 2/7/2017    Performed by Tor[facility] MD at [facility] OR 2ND FLR    PTA, AV FISTULA N/A 1/30/2019    Performed by Tor[facility]afana MD at [facility] CATH LAB    stent to fistula      TRANSESOPHAGEAL ECHOCARDIOGRAM (JARAD) N/A 6/27/2016    Performed by Sourav Machuca MD at [facility] CATH LAB    ULTRASOUND, UPPER GI TRACT, ENDOSCOPIC N/A 1/9/2014    Performed by Stewart Burgess MD at [facility] ENDO (2ND FLR)    VASCULAR SURGERY       fistula to L upper arm        Review of patient's allergies indicates:   Allergen Reactions    Bactrim [sulfamethoxazole-trimethoprim] Other (See Comments)     Causes renal failure    Nsaids (non-steroidal anti-inflammatory drug) Other (See Comments)     Renal failure     Family History     Problem Relation (Age of Onset)    Coronary artery disease Father    Diabetes Mother, Father, Maternal Grandmother    Hypertension Mother, Father    Kidney failure Mother    Lupus Sister        Tobacco Use    Smoking status: Former Smoker     Packs/day: 0.50     Years: 10.00     Pack years: 5.00     Types: Cigarettes     Last attempt to quit: 1994     Years since quittin.2    Smokeless tobacco: Never Used   Substance and Sexual Activity    Alcohol use: No     Alcohol/week: 0.0 oz     Comment: rarely    Drug use: No    Sexual activity: Not Currently     Review of Systems   Constitutional: Negative for activity change, appetite change, chills, diaphoresis, fatigue, fever and unexpected weight change.   HENT: Negative for sore throat and trouble swallowing.    Eyes: Negative for visual disturbance.   Respiratory: Negative for chest tightness and shortness of breath.    Cardiovascular: Negative for chest pain and leg swelling.   Gastrointestinal: Negative for abdominal distention, abdominal pain, anal bleeding, blood in stool, constipation, diarrhea, nausea and vomiting.   Genitourinary: Negative for dysuria and hematuria.   Musculoskeletal: Negative for arthralgias and myalgias.   Skin: Negative for rash.   Neurological: Positive for headaches. Negative for dizziness, weakness and light-headedness.   Psychiatric/Behavioral: Negative for agitation and confusion.     Objective:     Vital Signs (Most Recent):  Temp: 96.5 °F (35.8 °C) (07/10/19 1132)  Pulse: 70 (07/10/19 1132)  Resp: 20 (07/10/19 113)  BP: 110/70 (07/10/19 1132)  SpO2: 99 % (07/10/19 113) Vital Signs (24h Range):  Temp:  [96.1 °F (35.6 °C)-97.5  °F (36.4 °C)] 96.5 °F (35.8 °C)  Pulse:  [61-78] 70  Resp:  [18-20] 20  SpO2:  [96 %-100 %] 99 %  BP: (100-114)/(55-70) 110/70     Weight: 110 kg (242 lb 8.1 oz) (07/09/19 0331)  Body mass index is 36.87 kg/m².      Intake/Output Summary (Last 24 hours) at 7/10/2019 1619  Last data filed at 7/9/2019 1638  Gross per 24 hour   Intake 700 ml   Output 1200 ml   Net -500 ml       Lines/Drains/Airways     Drain                 Hemodialysis AV Graft Left upper arm -- days          Peripheral Intravenous Line                 Peripheral IV - Single Lumen Right;Medial Antecubital -- days         Peripheral IV - Single Lumen 07/08/19 1220 20 G;1 3/4 in Right Forearm 2 days                Physical Exam   Constitutional: She is oriented to person, place, and time. She appears well-developed and well-nourished. No distress.   Pleasant middle-aged female.  Sitting up in bed.  Eating sunflower seeds   HENT:   Head: Normocephalic and atraumatic.   Mouth/Throat: Oropharynx is clear and moist. No oropharyngeal exudate.   Eyes: Conjunctivae are normal. No scleral icterus.   Cardiovascular: Normal rate, regular rhythm, normal heart sounds and intact distal pulses.   EGD elevated to the earlobe   Pulmonary/Chest: Effort normal and breath sounds normal. No respiratory distress.   On nasal cannula   Abdominal: Soft. Bowel sounds are normal. She exhibits no distension and no mass. There is no tenderness. There is no rebound and no guarding.   Obese nontender abdomen   Musculoskeletal: She exhibits edema. She exhibits no tenderness.   Lymphadenopathy:     She has no cervical adenopathy.   Neurological: She is alert and oriented to person, place, and time.   Skin: Skin is warm. Capillary refill takes less than 2 seconds. No rash noted. She is not diaphoretic.   B/l lower extremities bandaged   Psychiatric: She has a normal mood and affect. Her behavior is normal. Judgment and thought content normal.   Nursing note and vitals  reviewed.      Significant Labs:  All pertinent lab results from the last 24 hours have been reviewed.    Significant Imaging:  Imaging results within the past 24 hours have been reviewed.
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
## 2019-07-10 NOTE — SUBJECTIVE & OBJECTIVE
Interval History: Patient seen and examined at bedside. No acute issues overnight. Feeling better after HD yesterday. Hg <7 today, will need 1 u prbc. LFTs continue to rise. US -ve, as was hepatitis and HIV.    Review of Systems   Constitutional: Positive for activity change and fatigue.   HENT: Negative.    Eyes: Negative.    Respiratory: Positive for shortness of breath (chronic).         On home oxygen / O2 via nasal cannula   Cardiovascular: Negative.    Gastrointestinal: Negative.  Negative for constipation, diarrhea, nausea and vomiting.   Endocrine: Negative.    Genitourinary: Negative.    Musculoskeletal:        R forearm pain and numbness   Skin: Positive for color change (hematoma).   Allergic/Immunologic: Negative.    Neurological: Positive for weakness.   Psychiatric/Behavioral: The patient is nervous/anxious.      Objective:     Vital Signs (Most Recent):  Temp: 96.5 °F (35.8 °C) (07/10/19 1132)  Pulse: 70 (07/10/19 1132)  Resp: 20 (07/10/19 1132)  BP: 110/70 (07/10/19 1132)  SpO2: 99 % (07/10/19 1132) Vital Signs (24h Range):  Temp:  [96.1 °F (35.6 °C)-97.5 °F (36.4 °C)] 96.5 °F (35.8 °C)  Pulse:  [60-78] 70  Resp:  [18-20] 20  SpO2:  [96 %-100 %] 99 %  BP: ()/(51-70) 110/70     Weight: 110 kg (242 lb 8.1 oz)  Body mass index is 36.87 kg/m².    Intake/Output Summary (Last 24 hours) at 7/10/2019 1505  Last data filed at 7/9/2019 1638  Gross per 24 hour   Intake 700 ml   Output 1200 ml   Net -500 ml      Physical Exam   Constitutional: She is oriented to person, place, and time. She appears well-developed and well-nourished.   obese   HENT:   Head: Normocephalic.   Eyes: Pupils are equal, round, and reactive to light. Conjunctivae and EOM are normal. Right eye exhibits no discharge. Left eye exhibits no discharge. No scleral icterus.   Cardiovascular: Normal rate and regular rhythm.   Pulmonary/Chest: Effort normal. Tachypnea noted. She has rhonchi.   On oxygen via nasal cannula   Abdominal: Soft.    Musculoskeletal: Normal range of motion. She exhibits no edema.   Neurological: She is alert and oriented to person, place, and time. She displays normal reflexes. No cranial nerve deficit or sensory deficit. She exhibits normal muscle tone. Coordination normal.   Psychiatric: Judgment normal. Her affect is labile. She exhibits a depressed mood.     Significant Labs:   CBC:   Recent Labs   Lab 07/09/19  1330 07/10/19  0520   WBC 15.05* 13.99*   HGB 7.2* 6.8*   HCT 24.6* 23.7*    207     CMP:   Recent Labs   Lab 07/09/19  1330 07/10/19  0520   * 133*   K 4.0 3.7   CL 91* 90*   CO2 26 29   * 132*   BUN 32* 21*   CREATININE 4.3* 2.9*   CALCIUM 8.5* 8.9   PROT 6.7 6.6   ALBUMIN 2.8* 2.7*   BILITOT 3.3* 2.4*   ALKPHOS 434* 419*   * 332*   * 312*   ANIONGAP 17* 14   EGFRNONAA 10.7* 17.3*       Significant Imaging: I have reviewed and interpreted all pertinent imaging results/findings within the past 24 hours.
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
## 2019-07-10 NOTE — PROGRESS NOTES
Dialysis treatment complete. Blood rinsed back without resistance.    500 mL fluid removed over 3 hours. Midodrine, Venofer, Epoetin, and Iron given during treatment. Patient eating salted sunflower seeds and drinking ice water during treatment.     Left upper arm AV fistula needles pulled. Pressure held x5 minutes for venous site and x10 minutes for arterial site. Gauze and paper tape applied to make pressure dressing.    Patient stood for  Post treatment weight on standing scale, patient's weight increased from pre-treatment weight. Patient transferred off SLIM in her own bed by two transporters.

## 2019-07-11 NOTE — PROGRESS NOTES
[facility] HEMODIALYSIS NOTE     Patient currently on hemodialysis for removal of uremic toxins and volume.     Patient seen and evaluated on hemodialysis, tolerating treatment, see HD flowsheet for vitals and assessments.      Ultrafiltration goal is 1-2L as tolerated, patient unable to be weighed today, keep map >65     Labs have been reviewed and the dialysate bath has been adjusted.     Assessment/Plan:    -Patient seen on HD, tolerating treatment well, w/o complaints   -Renal diet  -Strict I/O's and daily weights  -Daily renal function panels  -Hbg 7.2, continue Epo with HD  -Phos 4.0, continue renvela with meals   -Will continue to follow while inpatient       AYAN Levine, AGNP-C  Nephrology  Pager:  [phone]
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
## 2019-07-11 NOTE — PLAN OF CARE
Problem: Adult Inpatient Plan of Care  Goal: Plan of Care Review  Outcome: Ongoing (interventions implemented as appropriate)     07/11/19 7715   Plan of Care Review   Plan of Care Reviewed With patient;mother   Progress no change   Pt A+Ox4, cooperative throughout shift, prn ativan given for anxiety to good effect. Pt lost IV access just prior to beginning of pRBC transfusion, unable to obtain IV access, on call provider made aware, midline cath consult placed. LUE w/ AVF, +thrill+bruit. BLE w/ gauze in place, CDI. Pt repositioning self in bed. Neuro checks WNL. Safety maintained throughout shift.

## 2019-07-11 NOTE — PT/OT/SLP PROGRESS
Physical Therapy      Patient Name:  Sisi Medel   MRN:  [id number]    Patient not seen today secondary to Dialysis(Pt away to dialysis x 2 attempts. Writing therapist unable to return for another attempt. PT to follow with pt per POC as appropriate. ). Will follow-up per POC as appropriate.    Bernabe Branch, PTA

## 2019-07-11 NOTE — SUBJECTIVE & OBJECTIVE
Interval History: Patient seen and examined at bedside during HD today. ARELY, doing well. Reports mild HA, denies visual changes, dizziness. Getting 1U pRBC.     Review of Systems   Constitutional: Positive for activity change and fatigue.   HENT: Negative.    Eyes: Negative.    Respiratory: Positive for shortness of breath (chronic).         On home oxygen / O2 via nasal cannula   Cardiovascular: Negative.    Gastrointestinal: Negative.  Negative for constipation, diarrhea, nausea and vomiting.   Endocrine: Negative.    Genitourinary: Negative.    Musculoskeletal:        R forearm pain and numbness   Skin: Positive for color change (hematoma).   Allergic/Immunologic: Negative.    Neurological: Positive for weakness.   Psychiatric/Behavioral: The patient is nervous/anxious.      Objective:     Vital Signs (Most Recent):  Temp: 97.4 °F (36.3 °C) (07/11/19 1215)  Pulse: 71 (07/11/19 1228)  Resp: 18 (07/11/19 1215)  BP: 128/71 (07/11/19 1228)  SpO2: (!) 93 % (07/11/19 0713) Vital Signs (24h Range):  Temp:  [96.4 °F (35.8 °C)-98.6 °F (37 °C)] 97.4 °F (36.3 °C)  Pulse:  [56-80] 71  Resp:  [16-18] 18  SpO2:  [92 %-95 %] 93 %  BP: ()/(53-82) 128/71     Weight: 110 kg (242 lb 8.1 oz)  Body mass index is 36.87 kg/m².    Intake/Output Summary (Last 24 hours) at 7/11/2019 1315  Last data filed at 7/11/2019 0400  Gross per 24 hour   Intake 600 ml   Output --   Net 600 ml      Physical Exam   Constitutional: She is oriented to person, place, and time. She appears well-developed and well-nourished.   obese   HENT:   Head: Normocephalic.   Eyes: Pupils are equal, round, and reactive to light. Conjunctivae and EOM are normal. Right eye exhibits no discharge. Left eye exhibits no discharge. No scleral icterus.   Cardiovascular: Normal rate and regular rhythm.   Pulmonary/Chest: Effort normal. Tachypnea noted. She has rhonchi.   On oxygen via nasal cannula   Abdominal: Soft.   Musculoskeletal: Normal range of motion. She  exhibits no edema.   Neurological: She is alert and oriented to person, place, and time. She displays normal reflexes. No cranial nerve deficit or sensory deficit. She exhibits normal muscle tone. Coordination normal.   Psychiatric: Judgment normal. Her affect is labile. She exhibits a depressed mood.     Significant Labs:   CBC:   Recent Labs   Lab 07/09/19  1330 07/10/19  0520 07/11/19  0345   WBC 15.05* 13.99* 16.15*   HGB 7.2* 6.8* 7.2*   HCT 24.6* 23.7* 25.2*    207 147*     CMP:   Recent Labs   Lab 07/09/19  1330 07/10/19  0520 07/11/19  0345   * 133* 135*   K 4.0 3.7 4.7   CL 91* 90* 91*   CO2 26 29 27   * 132* 165*   BUN 32* 21* 42*   CREATININE 4.3* 2.9* 4.4*   CALCIUM 8.5* 8.9 8.9   PROT 6.7 6.6 6.7   ALBUMIN 2.8* 2.7* 2.7*   BILITOT 3.3* 2.4* 1.7*   ALKPHOS 434* 419* 377*   * 332* 323*   * 312* 339*   ANIONGAP 17* 14 17*   EGFRNONAA 10.7* 17.3* 10.5*       Significant Imaging: I have reviewed and interpreted all pertinent imaging results/findings within the past 24 hours.

## 2019-07-11 NOTE — PT/OT/SLP PROGRESS
"Speech Language Pathology Treatment    Patient Name:  Sisi Medel   MRN:  [id number]  Admitting Diagnosis: SDH (subdural hematoma)    Recommendations:                 General Recommendations:  Cognitive-linguistic therapy  Diet recommendations:  Regular, Liquid Diet Level: Thin   Aspiration Precautions: Monitor for s/s of aspiration and Standard aspiration precautions   General Precautions: Standard, fall  Communication strategies:  none    Subjective     "Itching all over." pt c/o itchiness all over her body since returning from HD. Nurse notified.     Pain/Comfort:  · Pain Rating 1: 0/10    Objective:     Has the patient been evaluated by SLP for swallowing?   Yes  Keep patient NPO? No   Current Respiratory Status: nasal cannula      Pt c/o itchiness all over her body upon SLP's entry, which she stated began after her return from HD.  Nurse notified.  Pt willing to participate in bedside cognitive tx, but session was ended early due to distraction from itchiness.  Pt was able to complete a delayed memory task recalling 3/3 unrelated words after a 3 minute delay given only set-up assistance to utilize short-term memory strategies.  Pt calculated elapsed time figurations with 80% accuracy. Only 5 trials completed as pt was too distracted by itchiness to concentrate on task.      Assessment:     Sisi Medel is a 58 y.o. female with an SLP diagnosis of mild Cognitive-Linguistic Impairment.     Goals:   Multidisciplinary Problems     SLP Goals        Problem: SLP Goal    Goal Priority Disciplines Outcome   SLP Goal     SLP Revised   Description:  Goals expected to be met by 7/11:   1. Pt will name >10 items in concrete category within one minute IND. -MET 7/5  2. Pt will complete mental manipulation of 4 word sets with 90% acc given min cues.   3. Pt will complete functional math word problems (time/money) with 80% acc given min cues. Goal met  4. Pt will utilize memory strategies to recall 3 units " of novel information after 3-5 minute delay with min cues.  Goal met  5. Pt will participate in further assessment of reading, writing, and cognition.       Updated goals expected to be met 7/18:   1. Pt will complete mental manipulation of 4 word sets with 90% acc given min cues.   2. Pt will complete functional math word problems (time/money) with 80% acc.  3. Pt will utilize memory strategies to recall 3 units of novel information after 5 minute delay with modified independence.   4. Pt will participate in further assessment of reading, writing, and cognition.                         Plan:     · Patient to be seen:  2 x/week   · Plan of Care expires:  08/02/19  · Plan of Care reviewed with:  patient   · SLP Follow-Up:  Yes       Discharge recommendations:  nursing facility, skilled     Time Tracking:     SLP Treatment Date:   07/11/19  Speech Start Time:  1506  Speech Stop Time:  1515     Speech Total Time (min):  9 min    Billable Minutes: Speech Therapy Individual 9    KARLENE Quevedo, CCC-SLP  07/11/2019     KARLENE Quevedo, CCC-SLP  Speech Language Pathologist  [phone]  7/11/2019

## 2019-07-11 NOTE — PLAN OF CARE
Problem: Adult Inpatient Plan of Care  Goal: Plan of Care Review  Outcome: Ongoing (interventions implemented as appropriate)  Dialysis completed, needles removed from RAISSA AVG with pressure held for 10minutes with hemostasis achieved.Gauze and tape applied,positive thrill and bruit. Dialyzed for 4hrs with fluid removal of 2071ml.. Pt was also given 1 unit of PRBC. Pt tolerated well.Report given to DEXTER Horne.

## 2019-07-11 NOTE — PLAN OF CARE
SW completed LOCET and submitted PASSR to the state.   Assigned SW will upload 142 to [facility].      Brandon Savage MSW, [facility]  [facility]  [id number]

## 2019-07-11 NOTE — NURSING
Complained of itching. Call to physician new order for medication. Patient reported benadryl upsets her. md aware. Assessment on going.

## 2019-07-11 NOTE — PT/OT/SLP PROGRESS
Speech Language Pathology      Sisi Medel  MRN: [id number]    Patient not seen today secondary to dialysis. Will follow-up later in day if able.    Rhea Daly, CORRINA-SLP

## 2019-07-11 NOTE — PT/OT/SLP PROGRESS
Occupational Therapy      Patient Name:  Sisi Medel   MRN:  [id number]    Patient not seen today secondary to Unavailable (Comment)(Checked with nurse Coleen at 954am with pt in dialysis. Unable to return in PM. ). Will follow-up as able.    Dalia Lofton OT  7/11/2019

## 2019-07-11 NOTE — NURSING
2020: Pt about to receive 1 unit pRBCs, PIV non-functioning, no other IV access. 2 floor RNs attempt to gain access unsuccessfully.  2045: Charge RN Jania spoke w/ anesthesia to see if they could attempt access, but they were unable to attempt.  2200: ICU RN and rapid response RN attempted access unsuccessfully, spoke w/ on call provider for med team R, orders to stop attempting PIV access, pt will not receive unit of pRBCs.

## 2019-07-11 NOTE — NURSING
Patient returned from dialysis alert and in no distress. Vital signs stable. Tolerated medications . Assessment on going. Did receive report from [place]. Fistula with positive thrills and bruits. No complaints voiced.

## 2019-07-11 NOTE — PLAN OF CARE
Problem: SLP Goal  Goal: SLP Goal  Goals expected to be met by 7/11:   1. Pt will name >10 items in concrete category within one minute IND. -MET 7/5  2. Pt will complete mental manipulation of 4 word sets with 90% acc given min cues.   3. Pt will complete functional math word problems (time/money) with 80% acc given min cues. Goal met  4. Pt will utilize memory strategies to recall 3 units of novel information after 3-5 minute delay with min cues.  Goal met  5. Pt will participate in further assessment of reading, writing, and cognition.       Updated goals expected to be met 7/18:   1. Pt will complete mental manipulation of 4 word sets with 90% acc given min cues.   2. Pt will complete functional math word problems (time/money) with 80% acc.  3. Pt will utilize memory strategies to recall 3 units of novel information after 5 minute delay with modified independence.   4. Pt will participate in further assessment of reading, writing, and cognition.       Outcome: Revised  Goals reviewed and revised. Cont POC.   KARLENE Quevedo, CCC-SLP  Speech Language Pathologist  [phone]  7/11/2019

## 2019-07-12 NOTE — PT/OT/SLP PROGRESS
Physical Therapy  Pt Not Seen    Patient Name:  Sisi Medel   MRN:  [id number]    2:28 pm  Patient not seen today secondary to  Other (Comment)(Pt with c/o chest pain.  RN (Coleen) was notified immediately). Will follow-up on next scheduled visit.    Keri Campos, PTA  7/12/2019

## 2019-07-12 NOTE — PROGRESS NOTES
"[facility]  Adult Nutrition  Progress Note    SUMMARY       Recommendations    Recommendation:   1. Continue renal/DM/cardiac diet with fluid restriction per MD   2. Continue novasource renal daily   3. Encourage gradual wt loss   4.RD to monitor and follow up    Goals: pt to meet >75% of EEN/EPN while admitted   Nutrition Goal Status: new  Communication of RD Recs: (POC)    Reason for Assessment    Reason For Assessment: length of stay  Diagnosis: (SDH)  Relevant Medical History: ESRD; HTN; CM; CHF; obesity  Interdisciplinary Rounds: did not attend  General Information Comments: Pt endorses good appetite, PO ~75% of meals and receiving novasource TID. States PTA intake was poor and states wt loss. UBW ~109 kg per pt, wt stable. Pt well nourished. Discussed renal/fluid restricition complaince with pt and obtaining healthy wt. Pt verbalized understanding. No c/o N/V/C/D reported.   Nutrition Discharge Planning: d/c on renal/DM diet     Nutrition Risk Screen    Nutrition Risk Screen: no indicators present    Nutrition/Diet History    Patient Reported Diet/Restrictions/Preferences: general  Spiritual, Cultural Beliefs, Uatsdin Practices, Values that Affect Care: no  Food Allergies: NKFA  Factors Affecting Nutritional Intake: None identified at this time    Anthropometrics    Temp: 97.2 °F (36.2 °C)  Height Method: Stated  Height: 5' 8" (172.7 cm)  Height (inches): 68 in  Weight Method: Bed Scale  Weight: 110 kg (242 lb 8.1 oz)  Weight (lb): 242.51 lb  Ideal Body Weight (IBW), Female: 140 lb  % Ideal Body Weight, Female (lb): 173.22 lb  BMI (Calculated): 37  BMI Grade: 35 - 39.9 - obesity - grade II  Usual Body Weight (UBW), k kg  % Usual Body Weight: 101.13    Lab/Procedures/Meds    Pertinent Labs Reviewed: reviewed  Pertinent Labs Comments: Na 133; BUN 27; Creatinine 3.0; glucose 179   Pertinent Medications Reviewed: reviewed  Pertinent Medications Comments: epoetin; paricalcitrol; " prednisone; senna-docusate; warfarin     Estimated/Assessed Needs    Weight Used For Calorie Calculations: 110 kg (242 lb 8.1 oz)  Energy Calorie Requirements (kcal): 2166 kcal/day   Energy Need Method: (x1.25)  Protein Requirements:  g/day (0.8-1.0 g/kg)   Weight Used For Protein Calculations: 110 kg (242 lb 8.1 oz)  Fluid Requirements (mL): 1 mL/kcal or per MD  RDA Method (mL): 2166    Nutrition Prescription Ordered    Current Diet Order: Renal/DM/ cardiac   Nutrition Order Comments: 1000 mL fluid restriction   Oral Nutrition Supplement: novasource     Evaluation of Received Nutrient/Fluid Intake    Energy Calories Required: meeting needs  Protein Required: meeting needs  Fluid Required: meeting needs  Comments: LBM 7/12  Tolerance: tolerating  % Intake of Estimated Energy Needs: 75 - 100 %  % Meal Intake: 75 - 100 %    Nutrition Risk    Level of Risk/Frequency of Follow-up: low(1x/week)     Assessment and Plan    Nutrition Problem  altered nutrition related lab values    Related to (etiology):   ESRD/DM    Signs and Symptoms (as evidenced by):   Na 133; BUN 27; Creatinine 3.0; glucose 179     Intervention (treatment strategy):  Collaboration with other providers     Nutrition Diagnosis Status:   New      Monitor and Evaluation    Food and Nutrient Intake: energy intake, food and beverage intake  Food and Nutrient Adminstration: diet order  Knowledge/Beliefs/Attitudes: food and nutrition knowledge/skill  Anthropometric Measurements: weight, weight change  Biochemical Data, Medical Tests and Procedures: electrolyte and renal panel, lipid profile, gastrointestinal profile, inflammatory profile, glucose/endocrine profile  Nutrition-Focused Physical Findings: overall appearance     Nutrition Follow-Up    RD Follow-up?: Yes

## 2019-07-12 NOTE — PLAN OF CARE
Problem: Adult Inpatient Plan of Care  Goal: Plan of Care Review  Outcome: Ongoing (interventions implemented as appropriate)     07/12/19 0501   Plan of Care Review   Plan of Care Reviewed With patient     Pt remain free from fall and injuries. VSS. Dressing changed to BLE, tolerated well. Anuric. Denies SOB. Safety maintained.Call light to easy reach, advised to call as needed and wait for the assist to arrive, verbalize understanding.  Will monitor.

## 2019-07-12 NOTE — TREATMENT PLAN
Hepatology Treatment Plan    Sisi Medel is a 58 y.o. female admitted to hospital 7/3/2019 (Hospital Day: 10) due to SDH (subdural hematoma).     Interval History  - T bili and alkaline phosphatase down trending from max of 3.3 on 07/09.  Transaminases stable.    Objective  Temp:  [96.7 °F (35.9 °C)-98 °F (36.7 °C)] 97.2 °F (36.2 °C) (07/12 1137)  Pulse:  [44-70] 70 (07/12 1442)  BP: (113-136)/(74-92) 122/85 (07/12 1442)  Resp:  [18-20] 18 (07/12 1442)  SpO2:  [94 %-100 %] 97 % (07/12 1442)    Laboratory    Lab Results   Component Value Date    WBC 16.52 (H) 07/12/2019    HGB 8.2 (L) 07/12/2019    HCT 29.5 (L) 07/12/2019    MCV 94 07/12/2019     (L) 07/12/2019       Lab Results   Component Value Date     (L) 07/12/2019    K 4.4 07/12/2019     07/12/2019    CO2 16 (L) 07/12/2019    BUN 27 (H) 07/12/2019    CREATININE 3.0 (H) 07/12/2019    CALCIUM 9.2 07/12/2019    ANIONGAP 14 07/12/2019    ESTGFRAFRICA 19.1 (A) 07/12/2019    EGFRNONAA 16.6 (A) 07/12/2019       Lab Results   Component Value Date    ALBUMIN 2.7 (L) 07/12/2019     (H) 07/12/2019     (H) 07/12/2019    GGT 54 10/24/2018    ALKPHOS 366 (H) 07/12/2019    BILITOT 1.6 (H) 07/12/2019    BILITOT 1.7 (H) 07/11/2019    BILITOT 2.4 (H) 07/10/2019       Lab Results   Component Value Date    INR 1.7 (H) 07/12/2019    INR 1.8 (H) 07/11/2019    INR 1.5 (H) 07/10/2019       No results found for: TACROLIMUS    MELD-Na score: 29 at 7/12/2019  4:08 AM  MELD score: 27 at 7/12/2019  4:08 AM  Calculated from:  Serum Creatinine: On dialysis. Using 4 mg/dL.  Serum Sodium: 133 mmol/L at 7/12/2019  4:08 AM  Total Bilirubin: 1.6 mg/dL at 7/12/2019  4:08 AM  INR(ratio): 1.7 at 7/12/2019  4:08 AM  Age: 58 years    Plan  - hold atorvastatin  - heart failure consultation  - will continue to monitor and determine if biopsy is indicated  - please obtain daily CBC, BMP, LFT, INR   - Plan of care was discussed with primary team    Thank you for  involving us in the care of Sisi Medel. Please call with any additional concerns or questions.    Bladimir Nix MD  Gastroenterology Fellow, PGY IV  Pager: [phone]

## 2019-07-12 NOTE — SUBJECTIVE & OBJECTIVE
"Interval History: Patient seen and examined at bedside. Alerted by nursing that patient was complaining of 10/10 chest pain. At bedside, patient resting. Opens eyes spontaneously. "I don't know, doctor" when asked about character of pain. "It comes and goes". Reproducible on mechanical compression of sternum. Has pain to superficial and deep palpation across abdomen. SOB at baseline. No fevers, chills, coughing.     Review of Systems   Constitutional: Positive for activity change and fatigue.   HENT: Negative.    Eyes: Negative.    Respiratory: Positive for shortness of breath (chronic).         On home oxygen / O2 via nasal cannula   Cardiovascular: Positive for chest pain.   Gastrointestinal: Positive for abdominal pain. Negative for constipation, diarrhea, nausea and vomiting.   Endocrine: Negative.    Genitourinary: Negative.    Musculoskeletal:        R forearm pain and numbness   Skin: Positive for color change (hematoma).   Allergic/Immunologic: Negative.    Neurological: Positive for weakness.   Psychiatric/Behavioral: The patient is nervous/anxious.      Objective:     Vital Signs (Most Recent):  Temp: 97.2 °F (36.2 °C) (07/12/19 1137)  Pulse: 70 (07/12/19 1442)  Resp: 18 (07/12/19 1442)  BP: 122/85 (07/12/19 1442)  SpO2: 97 % (07/12/19 1442) Vital Signs (24h Range):  Temp:  [96.7 °F (35.9 °C)-98 °F (36.7 °C)] 97.2 °F (36.2 °C)  Pulse:  [44-70] 70  Resp:  [18-20] 18  SpO2:  [94 %-100 %] 97 %  BP: (113-136)/(74-92) 122/85     Weight: 110 kg (242 lb 8.1 oz)  Body mass index is 36.87 kg/m².    Intake/Output Summary (Last 24 hours) at 7/12/2019 1706  Last data filed at 7/12/2019 1330  Gross per 24 hour   Intake 852 ml   Output 0 ml   Net 852 ml      Physical Exam   Constitutional: She is oriented to person, place, and time. She appears well-developed and well-nourished.   obese   HENT:   Head: Normocephalic.   Eyes: Pupils are equal, round, and reactive to light. Conjunctivae and EOM are normal. Right eye " exhibits no discharge. Left eye exhibits no discharge. No scleral icterus.   Cardiovascular: Normal rate and regular rhythm.   Pulmonary/Chest: Effort normal. Tachypnea noted. She has rhonchi.   On oxygen via nasal cannula   Abdominal: Soft. She exhibits no distension. There is tenderness.   Musculoskeletal: Normal range of motion. She exhibits no edema.   Neurological: She is alert and oriented to person, place, and time. She displays normal reflexes. No cranial nerve deficit or sensory deficit. She exhibits normal muscle tone. Coordination normal.   Psychiatric: Judgment normal. Her affect is labile. She exhibits a depressed mood.     Significant Labs:   CBC:   Recent Labs   Lab 07/11/19 0345 07/12/19  0408   WBC 16.15* 16.52*   HGB 7.2* 8.2*   HCT 25.2* 29.5*   * 141*     CMP:   Recent Labs   Lab 07/11/19 0345 07/12/19  0408   * 133*   K 4.7 4.4   CL 91* 103   CO2 27 16*   * 179*   BUN 42* 27*   CREATININE 4.4* 3.0*   CALCIUM 8.9 9.2   PROT 6.7 7.1   ALBUMIN 2.7* 2.7*   BILITOT 1.7* 1.6*   ALKPHOS 377* 366*   * 350*   * 388*   ANIONGAP 17* 14   EGFRNONAA 10.5* 16.6*       Significant Imaging: I have reviewed and interpreted all pertinent imaging results/findings within the past 24 hours.

## 2019-07-12 NOTE — PLAN OF CARE
Problem: Adult Inpatient Plan of Care  Goal: Plan of Care Review  Outcome: Ongoing (interventions implemented as appropriate)  Recommendations    Recommendation:   1. Continue renal/DM/cardiac diet with fluid restriction per MD   2. Continue novasource renal daily   3. Encourage gradual wt loss   4.RD to monitor and follow up    Goals: pt to meet >75% of EEN/EPN while admitted   Nutrition Goal Status: new  Communication of RD Recs: (POC)

## 2019-07-12 NOTE — NURSING
Call to md , patient complaining of stomach discomfort and did eat. Spoke with physician and she stated she will order something for her. Patient and mother aware.

## 2019-07-12 NOTE — PLAN OF CARE
Problem: Adult Inpatient Plan of Care  Goal: Plan of Care Review  Outcome: Ongoing (interventions implemented as appropriate)  Patient had dialysis, went for 4 hours and did receive one unit blood in dialysis. 2 liters fluid removed. Did receive midodrine and epogen and paracalcitrol in dialysis. No iv access as of yet report to charge nurse and oncoming nurse. Blood sugars monitored. No fall or occurrences noted. Cooperative with care. Dressings to bilateral lower extremities intact.

## 2019-07-12 NOTE — PLAN OF CARE
Problem: Occupational Therapy Goal  Goal: Occupational Therapy Goal  Goals to be met by: 7/14/19     Patient will increase functional independence with ADLs by performing:    UE Dressing with Stand-by Assistance. Met 7/5  Revised: UE dressing with supervision..  Grooming while seated with Supervision. MET 7/12/2019 SAMMIE  Toileting from bedside commode with Minimal Assistance for hygiene and clothing management.   Supine to sit with Supervision. progressing  Toilet transfer to bedside commode with Contact Guard Assistance. Progressing (Mod A)      Outcome: Ongoing (interventions implemented as appropriate)  Goals are still appropriate, continue w/ OT POC.

## 2019-07-12 NOTE — NURSING
"Awaiting EKG . Notified of need per charge nurse. Patient still complaining of pain non radiating "9 out of 10".   "
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
## 2019-07-12 NOTE — PT/OT/SLP PROGRESS
Occupational Therapy   Treatment    Name: Sisi Medel  MRN: [id number]  Admitting Diagnosis:  SDH (subdural hematoma)       Recommendations:     Discharge Recommendations: nursing facility, skilled  Discharge Equipment Recommendations:  (Bariatric BSC w/ drop arms, and Tub bench.)  Barriers to discharge:  None    Assessment:     Sisi Medel is a 58 y.o. female with a medical diagnosis of SDH (subdural hematoma).  She presents with anxiety, poor endurance, generalized weakness, and Pt displayed anxiety driven performance limiting behavior.  As anxiety increases, pain follows, and performance decreases. Performance deficits affecting function are weakness, impaired endurance, impaired self care skills, impaired functional mobilty, gait instability, impaired balance, decreased upper extremity function, decreased lower extremity function, decreased safety awareness, decreased ROM, impaired skin, edema, orthopedic precautions.     Rehab Prognosis:  Fair; patient would benefit from acute skilled OT services to address these deficits and reach maximum level of function.       Plan:     Patient to be seen 3 x/week to address the above listed problems via self-care/home management, therapeutic activities, therapeutic exercises  · Plan of Care Expires: 08/02/19  · Plan of Care Reviewed with: patient, son    Subjective     Pain/Comfort:  · Pain Rating 1: 0/10  · Pain Rating Post-Intervention 1: 0/10    Objective:     Communicated with: RN prior to session.  Patient found HOB elevated with telemetry, oxygen upon OT entry to room.    General Precautions: Standard, fall   Orthopedic Precautions:RLE weight bearing as tolerated   Braces: (R/L Darco shoes)     Occupational Performance:     Bed Mobility:    · Patient completed Rolling/Turning to Left with  supervision  · Patient completed Rolling/Turning to Right with supervision  · Patient completed Scooting/Bridging with stand by assistance  · Patient completed  Supine to Sit with contact guard assistance  · Patient completed Sit to Supine with moderate assistance and with leg lift     Functional Mobility/Transfers:  · Patient completed Toilet Transfer Scoot Pivot technique with moderate assistance with  hand-held assist  · Functional Mobility: Able to scoot in/around the bed w/ max motivation and Min A.     Activities of Daily Living:  · Grooming: supervision seated at EOB to apply lotion to arms, legs, and trunk.  · Toileting: couldn't  complete.      [facility] 6 Click ADL: 16    Treatment & Education:  -Pt edu on OT role/POC  -Importance of OOB activity with staff assistance  -Safety during functional t/f and mobility  - White board updated  - Multiple self care tasks/functional mobility completed-- assistance level noted above  - All questions/concerns answered within OT scope of practice      Patient left HOB elevated with all lines intact, call button in reach, RN notified and son presentEducation:      GOALS:   Multidisciplinary Problems     Occupational Therapy Goals        Problem: Occupational Therapy Goal    Goal Priority Disciplines Outcome Interventions   Occupational Therapy Goal     OT, PT/OT Ongoing (interventions implemented as appropriate)    Description:  Goals to be met by: 7/14/19     Patient will increase functional independence with ADLs by performing:    UE Dressing with Stand-by Assistance. Met 7/5  Revised: UE dressing with supervision..  Grooming while seated with Supervision. MET 7/12/2019 SAMMIE  Toileting from bedside commode with Minimal Assistance for hygiene and clothing management.   Supine to sit with Supervision. progressing  Toilet transfer to bedside commode with Contact Guard Assistance. Progressing (Mod A)                        Time Tracking:     OT Date of Treatment: 07/12/19  OT Start Time: 1139  OT Stop Time: 1213  OT Total Time (min): 34 min    Billable Minutes:Self Care/Home Management 34 mins    García Mabry, OT  7/12/2019

## 2019-07-12 NOTE — NURSING
Patient sent therapy to come to nurse stated she was complaining of chest pain on scale of 1 to 10 she stated a 9. Vital signs taken . Patient described as squeezing pain in mid chest . Denied radiating pain. Stat EKG ordered and Dr Cordero notified. Charge nurse came to room. Patient alert and on oxygen 3 liters per nasal cannula. EKG tech called and notified.

## 2019-07-13 NOTE — PLAN OF CARE
Problem: Adult Inpatient Plan of Care  Goal: Plan of Care Review  Outcome: Ongoing (interventions implemented as appropriate)  Patient alert and oriented and able to make needs known. She had therapy today . Did complain of chest pain earlier and stat EKG done and md examined patient. Also complained of stomach pain and doctor aware . Did get tylenol and tolerated well. At present resting. Blood sugars monitored. Has order for pacemaker check. Skin care to right upper arm tolerated well. Area healing well no open areas. Plan for dialysis tomorrow. Assessment on going . Mother at bedside.

## 2019-07-13 NOTE — PLAN OF CARE
Problem: Adult Inpatient Plan of Care  Goal: Plan of Care Review  Outcome: Ongoing (interventions implemented as appropriate)     07/13/19 0603   Plan of Care Review   Plan of Care Reviewed With patient;family     Pt remain free from fall and injuries at this time. Denies any discomfort. Had a quiet night sleep, family stayed with Pt. VSS. Needs acknowledged. Bed locked and lowest position. Will monitor.

## 2019-07-13 NOTE — PROGRESS NOTES
ESRD on HD:  Post-wt today 108.6kg; will continue to monitor during HD sessions and communicate lowest post-wt this hospitalization to OP HD unit on d/c.

## 2019-07-13 NOTE — PT/OT/SLP PROGRESS
Physical Therapy      Patient Name:  Sisi Medel   MRN:  [id number]    Patient not seen today secondary to Dialysis, Patient unwilling to participate. Pt away to dialysis in AM. Pt refusing therapy services in PM upon attempt. Pt states her body is feeling bad and she will not get OOB this day. Pt educated on importance of participating with therapy services as pt has now gone 5 days without physical therapy treatment session. Pt verbalizes understanding and states that she will participate when next attempted. Will follow-up per POC.    1 TA billed for education  [phone]    Bernabe Branch, PTA

## 2019-07-13 NOTE — PLAN OF CARE
Problem: Adult Inpatient Plan of Care  Goal: Plan of Care Review  Patient had dialysis this morning with the removal of 3L. Patient in bed and sits on the side with minimal assistance. Wound care performed per order. VS stable. Plan of care reviewed and questions answered.

## 2019-07-13 NOTE — PROGRESS NOTES
Hemodialysis Note  Pt seen and evaluated while undergoing dialysis. Tolerating dialysis with current UFR, without complications. Labs reviewed and dialysate bath adjusted.     OP HD Careplan: Reviewed. TTS. 285 min. . . .0kg. AVG. Treatment meds: epogen 5400 units IVP 3x/wk; hecotorl 3.5 mcg 3x/wk; zone bar 1 po 3x/wk  BFR: 400.   UF goal: 2.5-3L as tolerated. Pre-wt 111.0kg  Anemia: continue epogen  MBD: corrected Ca 9.88; continue paricalcitiol  HTN: on midodrine (w/ dose received this AM)  Diet: renal; start 800 mL FR/day; start Novasource

## 2019-07-13 NOTE — PLAN OF CARE
Problem: Adult Inpatient Plan of Care  Goal: Plan of Care Review  Outcome: Ongoing (interventions implemented as appropriate)  HD Tx ended. 3L removed in a 4hr Tx. Toleratred well. Blood returned via LUE AVG, 15g needles reomved x2. Gauze and tape applied, pressure held for 8mins. Hemostasis achieved.

## 2019-07-14 NOTE — HPI
58 year old female with a PMHx of pulmonary sarcoidosis on 3-5L home oxygen(on prednisone), NICM HFrEF (7/2019 EF 25%), perm Afib/flutter s/p 6/2017 AVN ablation & [place] CRT-P, ESRD (HD TTS-last dialysis yesterday), Type 2 DM (5/30/19 A1c 6.5%), pulmonary HTN, YOANDY, orthostatic hypotension, seizures (on keppra 500 mg Bid),  wheel chair bound admitted to [facility] s/p fall from wheel chair with right SDH.   Of note patient has had frequent hospitalizations in the recent past. These admissions are typically due to volume overload, CHF exacerbation, and shortness of breath. She follows with pulmonology as an outpatient and is on chronic steroids.    Cardiology is consulted for elevated AST/ALT ON labs with negative hepatic work up.

## 2019-07-14 NOTE — PT/OT/SLP PROGRESS
"Physical Therapy Treatment    Patient Name:  Sisi Medel   MRN:  [id number]    Recommendations:     Discharge Recommendations:  nursing facility, skilled   Discharge Equipment Recommendations: (Bariatric bedside commode and bath bench)   Barriers to discharge: Decreased caregiver support    Assessment:     Sisi Medel is a 58 y.o. female admitted with a medical diagnosis of SDH (subdural hematoma).  She presents with the following impairments/functional limitations:  weakness, impaired endurance, impaired self care skills, impaired functional mobilty, gait instability, impaired balance, decreased upper extremity function, decreased lower extremity function, decreased safety awareness, decreased ROM, impaired skin, edema, orthopedic precautions. Pt tolerates session fairly with focus on bed mobiity, transfers, and gait training. Pt progression limited by severe bouts of increased anxiety with pt becoming panicked and requiring seated rest and increased assistance to assure safety. Pt will continue to benefit from therapy services to address impairments listed above.     Rehab Prognosis: Fair; patient would benefit from acute skilled PT services to address these deficits and reach maximum level of function.    Recent Surgery: * No surgery found *      Plan:     During this hospitalization, patient to be seen 4 x/week to address the identified rehab impairments via gait training, therapeutic activities, therapeutic exercises and progress toward the following goals:    · Plan of Care Expires:  08/03/19    Subjective     Chief Complaint: fear of falling  Patient/Family Comments/goals: "I just don't want to fall. I have to sit, I have to sit now."   Pain/Comfort:  · Pain Rating 1: 0/10  · Pain Rating Post-Intervention 1: 0/10      Objective:     Communicated with NSG prior to session.  Patient found supine with telemetry, oxygen upon PTA entry to room.     General Precautions: Standard, fall "   Orthopedic Precautions:RLE weight bearing as tolerated   Braces: (B Darco shoes)     Functional Mobility:  · Bed Mobility:     · Supine to Sit: modified independence  · Sit to Supine: moderate assistance  · Transfers:     · Sit to Stand:  moderate assistance with rolling walker  · Bed to Chair: moderate assistance with  rolling walker  using  Stand Pivot  · Gait: Pt ambulates 8 ft with RW and Min A before becoming panicked about falling. Pt requires return to sitting. Unable to ambulate additional trials.       AM-PAC 6 CLICK MOBILITY  Turning over in bed (including adjusting bedclothes, sheets and blankets)?: 4  Sitting down on and standing up from a chair with arms (e.g., wheelchair, bedside commode, etc.): 3  Moving from lying on back to sitting on the side of the bed?: 4  Moving to and from a bed to a chair (including a wheelchair)?: 2  Need to walk in hospital room?: 2  Climbing 3-5 steps with a railing?: 2  Basic Mobility Total Score: 17       Therapeutic Activities and Exercises:   Pt assisted with functional mobility as noted above.   Pt assisted with transfer to bedside commode, pt independent with pericare. Pt requires increased assistance to stand from commode.   Pt requires increased time and rest breaks d/t multiple panic attacks.     Patient left HOB elevated with all lines intact and call button in reach.    GOALS:   Multidisciplinary Problems     Physical Therapy Goals        Problem: Physical Therapy Goal    Goal Priority Disciplines Outcome Goal Variances Interventions   Physical Therapy Goal     PT, PT/OT Ongoing (interventions implemented as appropriate)     Description:  Goals to be met by: 2019     Patient will increase functional independence with mobility by performin. Supine to sit with Modified [place]. Met (2019)  2. Sit to supine with supervision  3. Sit to stand transfer with Contact Guard Assistance with RW and maintaining any weight bearing precautions- Met  7/5  Sit to stand transfer with supervision assistance with RW from bedside chair.  4. Bed to chair transfer with Contact Guard Assistance using Rolling Walker and maintaining any weight bearing precautions. Met (7/9/2019)  4B. Bed to w/c transfer using sliding board and maintaining any weight bearing precautions w/ min assist  5. Gait  x 30 feet with Contact Guard Assistance using Rolling Walker. and maintaining any weight bearing precautions  6. Lower extremity exercise program x20 reps per handout, with assistance as needed                         Time Tracking:     PT Received On: 07/14/19  PT Start Time: 0810     PT Stop Time: 0904  PT Total Time (min): 54 min     Billable Minutes: Gait Training 12 and Therapeutic Activity 42    Treatment Type: Treatment  PT/PTA: PTA     PTA Visit Number: 1     Bernabe Branch, PTA  07/14/2019
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
## 2019-07-14 NOTE — ASSESSMENT & PLAN NOTE
- Patient seen and examined  - Some trace edema on LE +1, no JVD or HJ reflex on my exam  - I ordered BNP, CXR (similar to last one mild fluid mostly interstitital changes from sarcoid) O2 requirement same, TTE pending  - AST/ALT was elevated back on 5/27 as well trended down and is trending down no was well, Tbili down from 2.7 to 1.2    There may be a mild component of HF but she does not seem to be in flourid heart failure not cardiogenic shock  - Continue volume removal with dilaysis  - Chronic hypotension on midodrine and florinef. Warm on my exam  - r/o sepsis  - some abdominal pain but noted -ve US  - May be difficult due to hypotension to start her on GDMT for heart failure...  - More recommendations after tests results tommorow

## 2019-07-14 NOTE — PROGRESS NOTES
Patient's blood pressure is 89/60. Patient asymptomatic. Notified Dr. Cordero. No new orders at this time. Will continue to monitor.

## 2019-07-14 NOTE — CONSULTS
[facility]  Cardiology  Consult Note    Patient Name: Sisi Medel  MRN: [id number]  Admission Date: 7/3/2019  Hospital Length of Stay: 11 days  Code Status: Full Code   Attending Provider: Gladis Cordero*   Consulting Provider: Bobby Arevalo MD  Primary Care Physician: Carlos A Caputo MD  Principal Problem:SDH (subdural hematoma)    Patient information was obtained from patient and ER records.     Inpatient consult to Cardiology  Consult performed by: Bobby Arevalo MD  Consult ordered by: Gladis Cordero MD        Subjective:     Chief Complaint:  Elevated liver enzymes     HPI:   T58 year old female with PMHx of pulmonary sarcoidosis on 3-5L home oxygen(on prednisone), NICM HFrEF (3/2019 EF 35%), perm Afib/flutter s/p 6/2017 AVN ablation & [facility] CRT-P, ESRD (HD TTS-last dialysis yesterday), Type 2 DM (5/30/19 A1c 6.5%), pulmonary HTN, YOANDY, orthostatic hypotension, seizures (on keppra 500 mg Bid),  wheel chair bound admitted to [facility] s/p fall from wheel chair with right SDH. Of note patient has had frequent hospitalizations in the recent past. These admissions are typically due to volume overload, CHF exacerbation, and shortness of breath. She follows with pulmonology as an outpatient and is on chronic steroids.  Cardiology is consulted for elevated AST/ALT ON labs with negative hepatic work up.       Past Medical History:   Diagnosis Date    Anemia in ESRD (end-stage renal disease) 3/7/2016    Anticoagulant long-term use     Cervical radiculopathy 7/20/2015    CHF (congestive heart failure)     Chronic combined systolic and diastolic heart failure 6/14/2013    EF 20% 2013, improved with Medical therapy, negative PET 2013    Chronic respiratory failure with hypoxia 04/22/2013    On home oxygen at 2-5 liters    Closed fracture of proximal end of right fibula 6/27/2016    Complications due to renal dialysis device, implant, and graft     DDD  (degenerative disc disease), lumbar 2015    Dependence on renal dialysis     Diabetes mellitus type II, controlled 2017    ESRD on hemodialysis 2016    Essential hypertension     Gout     Lateral meniscal tear 2016    Lumbar stenosis 2015    Obesity, Class III, BMI 40-49.9 (morbid obesity)     Pacemaker     Paroxysmal atrial fibrillation 2014    Not on anticoagulation    Peripheral neuropathy 2013    Personal history of gastric ulcer 3/19/2013    Pneumonia of left lower lobe due to infectious organism 3/10/2019    Sarcoidosis, lung 2009    Diagnosed in  with ocular manifestation, on 4L home O2 and PO steroids    Secondary pulmonary hypertension 2015    Seizure disorder 3/19/2013    Shingles 2013    Thyroid disease        Past Surgical History:   Procedure Laterality Date    ABDOMINAL SURGERY      ABLATION N/A 2017    Performed [facility] CATH LAB    ANGIOPLASTY Left 1/10/2018    Performed by Torrey Villafana MD at [facility] OR 2ND FLR    ANGIOPLASTY-PERCUTANEOUS TRANSLUMINAL (PTA) Left 2017    Performed by Tor[facility]afana [facility] at [facility] OR 2ND FLR    ANGIOPLASTY-PERCUTANEOUS TRANSLUMINAL (PTA) Left 10/12/2016    Performed by [facility]afana [facility] at [facility] OR 2ND FLR    CARDIAC PACEMAKER PLACEMENT       SECTION      x2    DECLOT GRAFT PERCUTANEOUS Left 2017    Performed by Torrey Villafana MD at [facility] OR 2ND FLR    DECLOT-GRAFT Left 2016    Performed by Harjit Starr MD at [facility] CATH LAB    DECLOT-GRAFT Left 2016    Performed by [facility]jit Starr MD at [facility] CATH LAB    ECHOCARDIOGRAM-TRANSESOPHAGEAL N/A 2013    Performed by Delmy Surgeon at [facility] DELMY    Fistulogram Left 2019    Performed by [facility]ana MD at [facility] CATH LAB    fistulogram Left 1/10/2018    Performed by [facility]afana MD at [facility] OR 2ND FLR    FISTULOGRAM Left 2017    Performed by Torrey LOCKETT  MD Broderick at [facility] CATH LAB    FISTULOGRAM Left 10/12/2016    Performed by Torrey [facility]afana MD at [facility] OR 2ND FLR    FISTULOGRAM Left 6/21/2016    Performed by Harjit Starr MD at [facility] CATH LAB    Fistulogram, LUE AVG, possible intervention Left 1/30/2019    Performed by Torrey Villafana MD at [facility] CATH LAB    Fistulogram, OR 11 Left 5/8/2019    Performed by Torrey Villafana MD at [facility] OR 2ND FLR    INJECTION-STEROID-EPIDURAL-TRANSFORAMINAL Right 7/20/2015    Performed by Patria Gutierrez [facility]IN MGT    INJECTION-STEROID-EPIDURAL-TRANSFORAMINAL Right 5/21/2015    [facility]    IDCELHISU-BVKVE-NEBLJDUSIVLAC / Left upper AV graft. Left 2/3/2016    Performed by Torrey Villafana MD at [facility] OR 2ND FLR    XWUKSOXUQ-ARHXKHNSD-XFYEQPKVEFQNE N/A 1/11/2017    Performed by Bladimir Adorno MD at [facility] CATH LAB    OTHER SURGICAL HISTORY      loop recorder implant    PLACEMENT-STENT Left 2/7/2017    Performed by Torrey Villafana MD at [facility] OR 2ND FLR    PTA, AV FISTULA N/A 1/30/2019    Performed by Torrey Villafana MD at [facility] CATH LAB    stent to fistula      TRANSESOPHAGEAL ECHOCARDIOGRAM (JARAD) N/A 6/27/2016    Performed by Sourav Machuca MD at [facility] CATH LAB    ULTRASOUND, UPPER GI TRACT, ENDOSCOPIC N/A 1/9/2014    Performed by Stewart Burgess MD at [facility] ENDO (2ND FLR)    VASCULAR SURGERY      fistula to L upper arm        Review of patient's allergies indicates:   Allergen Reactions    Bactrim [sulfamethoxazole-trimethoprim] Other (See Comments)     Causes renal failure    Nsaids (non-steroidal anti-inflammatory drug) Other (See Comments)     Renal failure       No current facility-administered medications on file prior to encounter.      Current Outpatient Medications on File Prior to Encounter   Medication Sig    aspirin (ECOTRIN) 81 MG EC tablet Take 81 mg by mouth once daily.    blood sugar diagnostic Strp 1 each by Misc.(Non-Drug;  Combo Route) route once daily. (Patient taking differently: 1 each by Misc.(Non-Drug; Combo Route) route 2 (two) times daily. )    cinacalcet (SENSIPAR) 30 MG Tab Take 30 mg by mouth daily with breakfast.    clopidogrel (PLAVIX) 75 mg tablet TAKE 1 TABLET(75 MG) BY MOUTH EVERY DAY    fludrocortisone (FLORINEF) 0.1 mg Tab Take 1 tablet (100 mcg total) by mouth 2 (two) times daily.    lancets Misc 1 each by Misc.(Non-Drug; Combo Route) route once daily. (Patient taking differently: 1 each by Misc.(Non-Drug; Combo Route) route 2 (two) times daily. )    levETIRAcetam (KEPPRA) 500 MG Tab TAKE 1 TABLET BY MOUTH TWICE DAILY    miconazole NITRATE 2 % (MICOTIN) 2 % top powder Apply topically 2 (two) times daily.    midodrine (PROAMATINE) 10 MG tablet Take 1 tablet (10 mg total) by mouth 3 (three) times daily.    multivitamin (ONE DAILY MULTIVITAMIN) per tablet Take 1 tablet by mouth once daily.    nortriptyline (PAMELOR) 25 MG capsule Take 1 capsule by mouth nightly    patiromer calcium sorbitex (VELTASSA) 16.8 gram PwPk Hold until follow-up with PCP    prednisoLONE acetate (PRED FORTE) 1 % DrpS INSTILL ONE DROP INTO  LEFT EYE THREE TIMES A DAY    predniSONE (DELTASONE) 20 MG tablet Take 1 tablet (20 mg total) by mouth once daily.    PROLENSA 0.07 % Drop INSTILL 1 DROP IN LEFT / RIGHT EYE QD    DENISE-LINDA 0.8 mg Tab TAKE 1 TABLET BY MOUTH ONCE DAILY    sevelamer carbonate (RENVELA) 800 mg Tab Take 1 tablet (800 mg total) by mouth daily with dinner or evening meal.    tiZANidine (ZANAFLEX) 4 MG tablet Take 4 mg by mouth nightly as needed.    TRADJENTA 5 mg Tab tablet TAKE 1 TABLET(5 MG) BY MOUTH EVERY DAY    warfarin (COUMADIN) 5 MG tablet Take 1 tablet (5mg)  by mouth every Tues, Thurs, Sat and 1/2 tablet (2.5mg) every Mon, Wed, Fri. And Sun.    atorvastatin (LIPITOR) 80 MG tablet TAKE 1 TABLET BY MOUTH EVERY EVENING    LORazepam (ATIVAN) 1 MG tablet Take 0.5 tablets (0.5 mg total) by mouth every 8 (eight)  hours as needed for Anxiety.    omeprazole (PRILOSEC) 20 MG capsule TAKE 1 CAPSULE BY MOUTH EVERY DAY     Family History     Problem Relation (Age of Onset)    Coronary artery disease Father    Diabetes Mother, Father, Maternal Grandmother    Hypertension Mother, Father    Kidney failure Mother    Lupus Sister        Tobacco Use    Smoking status: Former Smoker     Packs/day: 0.50     Years: 10.00     Pack years: 5.00     Types: Cigarettes     Last attempt to quit: 1994     Years since quittin.2    Smokeless tobacco: Never Used   Substance and Sexual Activity    Alcohol use: No     Alcohol/week: 0.0 oz     Comment: rarely    Drug use: No    Sexual activity: Not Currently     Review of Systems   Constitution: Negative for chills, decreased appetite and diaphoresis.   HENT: Negative for congestion and ear discharge.    Eyes: Negative for blurred vision and discharge.   Cardiovascular: Positive for dyspnea on exertion and leg swelling. Negative for chest pain, irregular heartbeat and paroxysmal nocturnal dyspnea.   Respiratory: Negative for cough, hemoptysis and shortness of breath.    Gastrointestinal: Negative for abdominal pain.     Objective:     Vital Signs (Most Recent):  Temp: 96.3 °F (35.7 °C) (19)  Pulse: 70 (19)  Resp: 18 (19)  BP: (!) 85/57 (19)  SpO2: 96 % (19) Vital Signs (24h Range):  Temp:  [96.3 °F (35.7 °C)-97.8 °F (36.6 °C)] 96.3 °F (35.7 °C)  Pulse:  [66-73] 70  Resp:  [17-20] 18  SpO2:  [93 %-98 %] 96 %  BP: ()/(57-69) 85/57     Weight: 110 kg (242 lb 8.1 oz)  Body mass index is 36.87 kg/m².    SpO2: 96 %  O2 Device (Oxygen Therapy): nasal cannula      Intake/Output Summary (Last 24 hours) at 2019 1133  Last data filed at 2019 0900  Gross per 24 hour   Intake 360 ml   Output 1 ml   Net 359 ml       Lines/Drains/Airways     Drain                 Hemodialysis AV Graft Left upper arm -- days          Peripheral  Intravenous Line                 Peripheral IV - Single Lumen 07/11/19 1500 20 G;1 3/4 in Right Forearm 2 days                Physical Exam   Constitutional: She is oriented to person, place, and time. She appears well-developed and well-nourished. No distress.   Eyes: Pupils are equal, round, and reactive to light. Conjunctivae are normal.   Neck: No tracheal deviation present. No thyromegaly present.   Cardiovascular: Normal rate, regular rhythm, normal heart sounds and intact distal pulses. Exam reveals no gallop and no friction rub.   No murmur heard.  Pulses:       Radial pulses are 2+ on the right side, and 2+ on the left side.        Femoral pulses are 2+ on the right side, and 2+ on the left side.  Pulmonary/Chest: Effort normal. No respiratory distress. She has decreased breath sounds. She has no wheezes. She has no rales.   Abdominal: Soft. Bowel sounds are normal. She exhibits no distension. There is tenderness.   Musculoskeletal: She exhibits edema. She exhibits no deformity.   Neurological: She is alert and oriented to person, place, and time. No cranial nerve deficit. Coordination normal.   Skin: Skin is warm and dry. She is not diaphoretic.   Psychiatric: She has a normal mood and affect. Her behavior is normal.       Significant Labs:   BMP:   Recent Labs   Lab 07/13/19  0404 07/14/19  0555   * 135*   * 132*   K 4.5 4.0    95   CO2 17* 22*   BUN 49* 40*   CREATININE 4.7* 3.4*   CALCIUM 9.1 9.1   MG 2.5 2.2       Significant Imaging: Echocardiogram:   Transthoracic echo (TTE) complete (Cupid Only):   Results for orders placed or performed during the hospital encounter of 03/10/19   Transthoracic echo (TTE) complete (Cupid Only)   Result Value Ref Range    Ascending aorta 3.62 cm    STJ 3.35 cm    AV mean gradient 1.95 mmHg    Ao peak jazmyne 0.97 m/s    Ao VTI 15.57 cm    IVS 0.74 0.6 - 1.1 cm    LA size 5.12 cm    Left Atrium Major Axis 8.36 cm    Left Atrium Minor Axis 7.43 cm     LVIDD 5.95 3.5 - 6.0 cm    LVIDS 4.76 (A) 2.1 - 4.0 cm    LVOT diameter 2.27 cm    LVOT peak VTI 9.42 cm    PW 0.89 0.6 - 1.1 cm    RA Major Axis 7.69 cm    RA Width 4.73 cm    RVDD 4.49 cm    Sinus 3.84 cm    TAPSE 2.10 cm    TR Max Archie 3.22 m/s    TDI LATERAL 0.09     TDI SEPTAL 0.08     LA WIDTH 4.37 cm    LV Diastolic Volume 176.67 mL    LV Systolic Volume 105.58 mL    RV S' 11.31 m/s    LVOT peak archie [id number] m/s    FS 20 %    LA volume 149.63 cm3    LV mass 187.70 g    Left Ventricle Relative Wall Thickness 0.30 cm    AV valve area 2.45 cm2    AV Velocity Ratio 0.61     AV index (prosthetic) 0.61     Mean e' 0.09     LVOT area 4.05 cm2    LVOT stroke volume 38.10 cm3    AV peak gradient 3.76 mmHg    LV Systolic Volume Index 46.0 mL/m2    LV Diastolic Volume Index 76.94 mL/m2    LA Volume Index 65.2 mL/m2    LV Mass Index 81.7 g/m2    Triscuspid Valve Regurgitation Peak Gradient 41.47 mmHg    BSA 2.39 m2    Right Atrial Pressure (from IVC) 15 mmHg    TV rest pulmonary artery pressure 56 mmHg    Narrative    · Moderate left ventricular enlargement.  · Moderately decreased left ventricular systolic function. The estimated   ejection fraction is 35%  · Moderately reduced right ventricular systolic function.  · Left ventricular diastolic dysfunction.  · Severe biatrial enlargement.  · Mild-to-moderate mitral regurgitation.  · Mild tricuspid regurgitation.  · Pulmonary hypertension present.  · The estimated PA systolic pressure is 56 mm Hg  · Elevated central venous pressure (15 mm Hg).        Assessment and Plan:     CHF (congestive heart failure)  - Patient seen and examined  - Some trace edema on LE +1, no JVD or HJ reflex on my exam  - I ordered BNP, CXR (similar to last one mild fluid mostly interstitital changes from sarcoid) O2 requirement same, TTE pending  - AST/ALT was elevated back on 5/27 as well trended down and is trending down no was well, Tbili down from 2.7 to 1.2    There may be a mild  component of HF but she does not seem to be in flourid heart failure not cardiogenic shock  - Continue volume removal with dilaysis  - Chronic hypotension on midodrine and florinef. Warm on my exam  - r/o sepsis  - some abdominal pain but noted -ve US  - May be difficult due to hypotension to start her on GDMT for heart failure...  - More recommendations after tests results tommorow        VTE Risk Mitigation (From admission, onward)        Ordered     warfarin (COUMADIN) tablet 2.5 mg  Every Sunday 07/08/19 1429     warfarin (COUMADIN) tablet 5 mg  Every Tues, Thurs, Sat      07/08/19 1429     warfarin (COUMADIN) tablet 2.5 mg  Every Mon, Wed, Fri 07/08/19 1429          Thank you for your consult. I will follow-up with patient. Please contact us if you have any additional questions.    Bobby Arevalo MD  Cardiology   [facility]

## 2019-07-14 NOTE — TREATMENT PLAN
Hepatology Treatment Plan    Sisi Medel is a 58 y.o. female admitted to hospital 7/3/2019 (Hospital Day: 12) due to SDH (subdural hematoma).     Interval History  - LFTs continue to downtrend  - Cards consult pending today. Pending further evaluation but feel likely mild component of CHF.    Objective  Temp:  [96.3 °F (35.7 °C)-97.8 °F (36.6 °C)] 97.2 °F (36.2 °C) (07/14 1206)  Pulse:  [70-73] 70 (07/14 1206)  BP: ()/(57-69) 89/60 (07/14 1206)  Resp:  [17-18] 18 (07/14 1206)  SpO2:  [95 %-98 %] 98 % (07/14 1206)      Laboratory    Lab Results   Component Value Date    WBC 15.04 (H) 07/14/2019    HGB 8.3 (L) 07/14/2019    HCT 28.3 (L) 07/14/2019    MCV 89 07/14/2019     (L) 07/14/2019       Lab Results   Component Value Date     (L) 07/14/2019    K 4.0 07/14/2019    CL 95 07/14/2019    CO2 22 (L) 07/14/2019    BUN 40 (H) 07/14/2019    CREATININE 3.4 (H) 07/14/2019    CALCIUM 9.1 07/14/2019    ANIONGAP 15 07/14/2019    ESTGFRAFRICA 16.5 (A) 07/14/2019    EGFRNONAA 14.3 (A) 07/14/2019       Lab Results   Component Value Date    ALBUMIN 2.6 (L) 07/14/2019     (H) 07/14/2019     (H) 07/14/2019    GGT 54 10/24/2018    ALKPHOS 297 (H) 07/14/2019    BILITOT 1.2 (H) 07/14/2019    BILITOT 1.3 (H) 07/13/2019    BILITOT 1.6 (H) 07/12/2019       Lab Results   Component Value Date    INR 2.1 (H) 07/14/2019    INR 1.8 (H) 07/13/2019    INR 1.7 (H) 07/12/2019       No results found for: TACROLIMUS    MELD-Na score: 31 at 7/14/2019  5:56 AM  MELD score: 29 at 7/14/2019  5:56 AM  Calculated from:  Serum Creatinine: On dialysis. Using 4 mg/dL.  Serum Sodium: 132 mmol/L at 7/14/2019  5:55 AM  Total Bilirubin: 1.2 mg/dL at 7/14/2019  5:55 AM  INR(ratio): 2.1 at 7/14/2019  5:56 AM  Age: 58 years    Plan  - continue to trend CMP which is resolving  - if labs continue to resolve will plan for outpatient follow-up and consideration of biopsy if needed  - appreciate CHF input  - please obtain  daily CBC, BMP, LFT, INR     Thank you for involving us in the care of Sisi Medel. Please call with any additional concerns or questions.    Bladimir Nix MD  Gastroenterology Fellow, PGY IV  Pager: [phone]

## 2019-07-14 NOTE — PLAN OF CARE
Problem: Physical Therapy Goal  Goal: Physical Therapy Goal  Goals to be met by: 2019     Patient will increase functional independence with mobility by performin. Supine to sit with Modified [place]. Met (2019)  2. Sit to supine with supervision  3. Sit to stand transfer with Contact Guard Assistance with RW and maintaining any weight bearing precautions- Met   Sit to stand transfer with supervision assistance with RW from bedside chair.  4. Bed to chair transfer with Contact Guard Assistance using Rolling Walker and maintaining any weight bearing precautions. Met (2019)  4B. Bed to w/c transfer using sliding board and maintaining any weight bearing precautions w/ min assist  5. Gait  x 30 feet with Contact Guard Assistance using Rolling Walker. and maintaining any weight bearing precautions  6. Lower extremity exercise program x20 reps per handout, with assistance as needed        Outcome: Ongoing (interventions implemented as appropriate)  Goals remain appropriate.

## 2019-07-14 NOTE — SUBJECTIVE & OBJECTIVE
Interval History: Patient seen and examined at bedside. Ill-appearing. Had HD earlier today. Reporting nausea at bedside with dry heaving but no vomiting. Mother is present. Still reporting abdo discomfort, but per mom and nursing, has had good appetite and consumed all of her dinner. Zofran prn to be given, trial GI cocktail. Awaiting HTS consult recs and therapeutic INR with repeat CTH prior to transfer to SNF (placement pending).    Review of Systems   Constitutional: Positive for activity change and fatigue.   HENT: Negative.    Eyes: Negative.    Respiratory: Positive for shortness of breath (chronic).         On home oxygen / O2 via nasal cannula   Cardiovascular: Positive for chest pain.   Gastrointestinal: Positive for abdominal pain. Negative for constipation, diarrhea, nausea and vomiting.   Endocrine: Negative.    Genitourinary: Negative.    Musculoskeletal:        R forearm pain and numbness   Skin: Positive for color change (hematoma).   Allergic/Immunologic: Negative.    Neurological: Positive for weakness.   Psychiatric/Behavioral: The patient is nervous/anxious.      Objective:     Vital Signs (Most Recent):  Temp: 97.6 °F (36.4 °C) (07/13/19 1733)  Pulse: 71 (07/13/19 1733)  Resp: 18 (07/13/19 1733)  BP: 99/60 (07/13/19 1733)  SpO2: 98 % (07/13/19 1733) Vital Signs (24h Range):  Temp:  [97.6 °F (36.4 °C)-98.2 °F (36.8 °C)] 97.6 °F (36.4 °C)  Pulse:  [50-72] 71  Resp:  [17-20] 18  SpO2:  [93 %-98 %] 98 %  BP: ()/(46-88) 99/60     Weight: 110 kg (242 lb 8.1 oz)  Body mass index is 36.87 kg/m².    Intake/Output Summary (Last 24 hours) at 7/13/2019 1914  Last data filed at 7/13/2019 1500  Gross per 24 hour   Intake 1060 ml   Output 3441 ml   Net -2381 ml      Physical Exam   Constitutional: She is oriented to person, place, and time. She appears well-developed and well-nourished.   obese   HENT:   Head: Normocephalic.   Eyes: Pupils are equal, round, and reactive to light. Conjunctivae and EOM are  normal. Right eye exhibits no discharge. Left eye exhibits no discharge. No scleral icterus.   Cardiovascular: Normal rate and regular rhythm.   Pulmonary/Chest: Effort normal. Tachypnea noted. She has rhonchi.   On oxygen via nasal cannula   Abdominal: Soft. She exhibits no distension. There is tenderness.   Musculoskeletal: Normal range of motion. She exhibits no edema.   Neurological: She is alert and oriented to person, place, and time. She displays normal reflexes. No cranial nerve deficit or sensory deficit. She exhibits normal muscle tone. Coordination normal.   Psychiatric: Judgment normal. Her affect is labile. She exhibits a depressed mood.     Significant Labs:   CBC:   Recent Labs   Lab 07/12/19  0408 07/13/19  0404   WBC 16.52* 15.74*   HGB 8.2* 8.9*   HCT 29.5* 30.7*   * 153     CMP:   Recent Labs   Lab 07/12/19  0408 07/13/19  0404   * 133*   K 4.4 4.5    100   CO2 16* 17*   * 116*   BUN 27* 49*   CREATININE 3.0* 4.7*   CALCIUM 9.2 9.1   PROT 7.1 7.4   ALBUMIN 2.7* 2.9*   BILITOT 1.6* 1.3*   ALKPHOS 366* 348*   * 310*   * 432*   ANIONGAP 14 16   EGFRNONAA 16.6* 9.7*       Significant Imaging: I have reviewed and interpreted all pertinent imaging results/findings within the past 24 hours.

## 2019-07-14 NOTE — SUBJECTIVE & OBJECTIVE
Interval History: Patient seen and examined at bedside. Appeared in better spirits today, sitting up in bed. Mom at bedside. Complaining of mild HA, but otherwise doing well. INR therapeutic today, will proceed with CTH. SNF placement pending. Hepatology and Cardiology evaluations for suspected congestive hepatopathy ongoing. Awaiting TTE.     Review of Systems   Constitutional: Positive for activity change and fatigue.   HENT: Negative.    Eyes: Negative.    Respiratory: Positive for shortness of breath (chronic).         On home oxygen / O2 via nasal cannula   Cardiovascular: Positive for chest pain.   Gastrointestinal: Positive for abdominal pain. Negative for constipation, diarrhea, nausea and vomiting.   Endocrine: Negative.    Genitourinary: Negative.    Musculoskeletal:        R forearm pain and numbness   Skin: Positive for color change (hematoma).   Allergic/Immunologic: Negative.    Neurological: Positive for weakness.   Psychiatric/Behavioral: The patient is nervous/anxious.      Objective:     Vital Signs (Most Recent):  Temp: 96.3 °F (35.7 °C) (07/14/19 0815)  Pulse: 70 (07/14/19 0815)  Resp: 18 (07/14/19 0815)  BP: (!) 85/57 (07/14/19 0815)  SpO2: 96 % (07/14/19 0815) Vital Signs (24h Range):  Temp:  [96.3 °F (35.7 °C)-97.8 °F (36.6 °C)] 96.3 °F (35.7 °C)  Pulse:  [66-73] 70  Resp:  [17-20] 18  SpO2:  [93 %-98 %] 96 %  BP: ()/(57-69) 85/57     Weight: 110 kg (242 lb 8.1 oz)  Body mass index is 36.87 kg/m².    Intake/Output Summary (Last 24 hours) at 7/14/2019 1141  Last data filed at 7/14/2019 0900  Gross per 24 hour   Intake 360 ml   Output 1 ml   Net 359 ml      Physical Exam   Constitutional: She is oriented to person, place, and time. She appears well-developed and well-nourished.   obese   HENT:   Head: Normocephalic.   Eyes: Pupils are equal, round, and reactive to light. Conjunctivae and EOM are normal. Right eye exhibits no discharge. Left eye exhibits no discharge. No scleral icterus.    Cardiovascular: Normal rate and regular rhythm.   Pulmonary/Chest: Effort normal. Tachypnea noted. She has rhonchi.   On oxygen via nasal cannula   Abdominal: Soft. She exhibits no distension. There is tenderness.   Musculoskeletal: Normal range of motion. She exhibits no edema.   Neurological: She is alert and oriented to person, place, and time. She displays normal reflexes. No cranial nerve deficit or sensory deficit. She exhibits normal muscle tone. Coordination normal.   Psychiatric: Judgment normal. Her affect is labile. She exhibits a depressed mood.     Significant Labs:   CBC:   Recent Labs   Lab 07/13/19  0404 07/14/19  0555   WBC 15.74* 15.04*   HGB 8.9* 8.3*   HCT 30.7* 28.3*    133*     CMP:   Recent Labs   Lab 07/13/19  0404 07/14/19  0555   * 132*   K 4.5 4.0    95   CO2 17* 22*   * 135*   BUN 49* 40*   CREATININE 4.7* 3.4*   CALCIUM 9.1 9.1   PROT 7.4 6.8   ALBUMIN 2.9* 2.6*   BILITOT 1.3* 1.2*   ALKPHOS 348* 297*   * 169*   * 310*   ANIONGAP 16 15   EGFRNONAA 9.7* 14.3*       Significant Imaging: I have reviewed and interpreted all pertinent imaging results/findings within the past 24 hours.

## 2019-07-14 NOTE — SUBJECTIVE & OBJECTIVE
Past Medical History:   Diagnosis Date    Anemia in ESRD (end-stage renal disease) 3/7/2016    Anticoagulant long-term use     Cervical radiculopathy 2015    CHF (congestive heart failure)     Chronic combined systolic and diastolic heart failure 2013    EF 20% , improved with Medical therapy, negative PET     Chronic respiratory failure with hypoxia 2013    On home oxygen at 2-5 liters    Closed fracture of proximal end of right fibula 2016    Complications due to renal dialysis device, implant, and graft     DDD (degenerative disc disease), lumbar 2015    Dependence on renal dialysis     Diabetes mellitus type II, controlled 2017    ESRD on hemodialysis 2016    Essential hypertension     Gout     Lateral meniscal tear 2016    Lumbar stenosis 2015    Obesity, Class III, BMI 40-49.9 (morbid obesity)     Pacemaker     Paroxysmal atrial fibrillation 2014    Not on anticoagulation    Peripheral neuropathy 2013    Personal history of gastric ulcer 3/19/2013    Pneumonia of left lower lobe due to infectious organism 3/10/2019    Sarcoidosis, lung 2009    Diagnosed in  with ocular manifestation, on 4L home O2 and PO steroids    Secondary pulmonary hypertension 2015    Seizure disorder 3/19/2013    Shingles 2013    Thyroid disease        Past Surgical History:   Procedure Laterality Date    ABDOMINAL SURGERY      ABLATION N/A 2017    Performed by [facility] at [facility] CATH LAB    ANGIOPLASTY Left 1/10/2018    Performed by Torrey [facility]afana [facility] at [facility] OR 2ND FLR    ANGIOPLASTY-PERCUTANEOUS TRANSLUMINAL (PTA) Left 2017    Performed by [facility] at [facility] OR 2ND FLR    ANGIOPLASTY-PERCUTANEOUS TRANSLUMINAL (PTA) Left 10/12/2016    Performed by [facility] at [facility] OR 2ND FLR    CARDIAC PACEMAKER PLACEMENT       SECTION      x2    DECLOT GRAFT PERCUTANEOUS Left  2/7/2017    Performed by Torrey Villafana MD at [facility] OR 2ND FLR    DECLOT-GRAFT Left 6/21/2016    Performed by Harjit Starr MD at [facility] CATH LAB    DECLOT-GRAFT Left 6/20/2016    Performed by Harjit Starr MD at [facility] CATH LAB    ECHOCARDIOGRAM-TRANSESOPHAGEAL N/A 6/27/2013    Performed by Delmy Surgeon at [facility] DELMY    Fistulogram Left 4/8/2019    Performed by Torrey Villafana MD at [facility] CATH LAB    fistulogram Left 1/10/2018    Performed by Torrey Villafana MD at [facility] OR 2ND FLR    FISTULOGRAM Left 9/13/2017    Performed by Torrey Villafana MD at [facility] CATH LAB    FISTULOGRAM Left 10/12/2016    Performed by Torrey Villafana MD at [facility] OR 2ND FLR    FISTULOGRAM Left 6/21/2016    Performed by Harjit Starr MD at [facility] CATH LAB    Fistulogram, LUE AVG, possible intervention Left 1/30/2019    Performed by Torrey Villafana MD at [facility] CATH LAB    Fistulogram, OR 11 Left 5/8/2019    Performed by Torrey Villafana MD at [facility] OR 2ND FLR    INJECTION-STEROID-EPIDURAL-TRANSFORAMINAL Right 7/20/2015    Performed by Patria Gutierrez MD at [facility] [place], operated by [facility] PAIN MGT    INJECTION-STEROID-EPIDURAL-TRANSFORAMINAL Right 5/21/2015    Performed by Patria Gutierrez MD at [facility] [place], operated by [facility] PAIN MGT    ZIWCDBAPE-RTOHH-YOIKMAOMMVZFN / Left upper AV graft. Left 2/3/2016    Performed by Torrey Villafana MD at [facility] OR 2ND FLR    CQOZKCICP-ZHXIZWWRB-ZNRKZNOTEYMHC N/A 1/11/2017    Performed by Bladimir Adorno MD at [facility] CATH LAB    OTHER SURGICAL HISTORY      loop recorder implant    PLACEMENT-STENT Left 2/7/2017    Performed by Torrey Villafana MD at [facility] OR 2ND FLR    PTA, AV FISTULA N/A 1/30/2019    Performed by Torrey Villafana MD at [facility] CATH LAB    stent to fistula      TRANSESOPHAGEAL ECHOCARDIOGRAM (JARAD) N/A 6/27/2016    Performed by Sourav Machuca MD at [facility] CATH LAB    ULTRASOUND, UPPER GI TRACT, ENDOSCOPIC N/A 1/9/2014    Performed by Stewart Burgess MD at [facility] ENDO (2ND FLR)    VASCULAR SURGERY       fistula to L upper arm        Review of patient's allergies indicates:   Allergen Reactions    Bactrim [sulfamethoxazole-trimethoprim] Other (See Comments)     Causes renal failure    Nsaids (non-steroidal anti-inflammatory drug) Other (See Comments)     Renal failure       No current facility-administered medications on file prior to encounter.      Current Outpatient Medications on File Prior to Encounter   Medication Sig    aspirin (ECOTRIN) 81 MG EC tablet Take 81 mg by mouth once daily.    blood sugar diagnostic Strp 1 each by Misc.(Non-Drug; Combo Route) route once daily. (Patient taking differently: 1 each by Misc.(Non-Drug; Combo Route) route 2 (two) times daily. )    cinacalcet (SENSIPAR) 30 MG Tab Take 30 mg by mouth daily with breakfast.    clopidogrel (PLAVIX) 75 mg tablet TAKE 1 TABLET(75 MG) BY MOUTH EVERY DAY    fludrocortisone (FLORINEF) 0.1 mg Tab Take 1 tablet (100 mcg total) by mouth 2 (two) times daily.    lancets Misc 1 each by Misc.(Non-Drug; Combo Route) route once daily. (Patient taking differently: 1 each by Misc.(Non-Drug; Combo Route) route 2 (two) times daily. )    levETIRAcetam (KEPPRA) 500 MG Tab TAKE 1 TABLET BY MOUTH TWICE DAILY    miconazole NITRATE 2 % (MICOTIN) 2 % top powder Apply topically 2 (two) times daily.    midodrine (PROAMATINE) 10 MG tablet Take 1 tablet (10 mg total) by mouth 3 (three) times daily.    multivitamin (ONE DAILY MULTIVITAMIN) per tablet Take 1 tablet by mouth once daily.    nortriptyline (PAMELOR) 25 MG capsule Take 1 capsule by mouth nightly    patiromer calcium sorbitex (VELTASSA) 16.8 gram PwPk Hold until follow-up with PCP    prednisoLONE acetate (PRED FORTE) 1 % DrpS INSTILL ONE DROP INTO  LEFT EYE THREE TIMES A DAY    predniSONE (DELTASONE) 20 MG tablet Take 1 tablet (20 mg total) by mouth once daily.    PROLENSA 0.07 % Drop INSTILL 1 DROP IN LEFT / RIGHT EYE QD    DENISE-LINDA 0.8 mg Tab TAKE 1 TABLET BY MOUTH ONCE DAILY     sevelamer carbonate (RENVELA) 800 mg Tab Take 1 tablet (800 mg total) by mouth daily with dinner or evening meal.    tiZANidine (ZANAFLEX) 4 MG tablet Take 4 mg by mouth nightly as needed.    TRADJENTA 5 mg Tab tablet TAKE 1 TABLET(5 MG) BY MOUTH EVERY DAY    warfarin (COUMADIN) 5 MG tablet Take 1 tablet (5mg)  by mouth every Tues, Thurs, Sat and 1/2 tablet (2.5mg) every Mon, Wed, Fri. And Sun.    atorvastatin (LIPITOR) 80 MG tablet TAKE 1 TABLET BY MOUTH EVERY EVENING    LORazepam (ATIVAN) 1 MG tablet Take 0.5 tablets (0.5 mg total) by mouth every 8 (eight) hours as needed for Anxiety.    omeprazole (PRILOSEC) 20 MG capsule TAKE 1 CAPSULE BY MOUTH EVERY DAY     Family History     Problem Relation (Age of Onset)    Coronary artery disease Father    Diabetes Mother, Father, Maternal Grandmother    Hypertension Mother, Father    Kidney failure Mother    Lupus Sister        Tobacco Use    Smoking status: Former Smoker     Packs/day: 0.50     Years: 10.00     Pack years: 5.00     Types: Cigarettes     Last attempt to quit: 1994     Years since quittin.2    Smokeless tobacco: Never Used   Substance and Sexual Activity    Alcohol use: No     Alcohol/week: 0.0 oz     Comment: rarely    Drug use: No    Sexual activity: Not Currently     Review of Systems   Constitution: Negative for chills, decreased appetite and diaphoresis.   HENT: Negative for congestion and ear discharge.    Eyes: Negative for blurred vision and discharge.   Cardiovascular: Positive for dyspnea on exertion and leg swelling. Negative for chest pain, irregular heartbeat and paroxysmal nocturnal dyspnea.   Respiratory: Negative for cough, hemoptysis and shortness of breath.    Gastrointestinal: Negative for abdominal pain.     Objective:     Vital Signs (Most Recent):  Temp: 96.3 °F (35.7 °C) (19)  Pulse: 70 (19)  Resp: 18 (19)  BP: (!) 85/57 (19)  SpO2: 96 % (19) Vital Signs (24h  Range):  Temp:  [96.3 °F (35.7 °C)-97.8 °F (36.6 °C)] 96.3 °F (35.7 °C)  Pulse:  [66-73] 70  Resp:  [17-20] 18  SpO2:  [93 %-98 %] 96 %  BP: ()/(57-69) 85/57     Weight: 110 kg (242 lb 8.1 oz)  Body mass index is 36.87 kg/m².    SpO2: 96 %  O2 Device (Oxygen Therapy): nasal cannula      Intake/Output Summary (Last 24 hours) at 7/14/2019 1133  Last data filed at 7/14/2019 0900  Gross per 24 hour   Intake 360 ml   Output 1 ml   Net 359 ml       Lines/Drains/Airways     Drain                 Hemodialysis AV Graft Left upper arm -- days          Peripheral Intravenous Line                 Peripheral IV - Single Lumen 07/11/19 1500 20 G;1 3/4 in Right Forearm 2 days                Physical Exam   Constitutional: She is oriented to person, place, and time. She appears well-developed and well-nourished. No distress.   Eyes: Pupils are equal, round, and reactive to light. Conjunctivae are normal.   Neck: No tracheal deviation present. No thyromegaly present.   Cardiovascular: Normal rate, regular rhythm, normal heart sounds and intact distal pulses. Exam reveals no gallop and no friction rub.   No murmur heard.  Pulses:       Radial pulses are 2+ on the right side, and 2+ on the left side.        Femoral pulses are 2+ on the right side, and 2+ on the left side.  Pulmonary/Chest: Effort normal. No respiratory distress. She has decreased breath sounds. She has no wheezes. She has no rales.   Abdominal: Soft. Bowel sounds are normal. She exhibits no distension. There is tenderness.   Musculoskeletal: She exhibits edema. She exhibits no deformity.   Neurological: She is alert and oriented to person, place, and time. No cranial nerve deficit. Coordination normal.   Skin: Skin is warm and dry. She is not diaphoretic.   Psychiatric: She has a normal mood and affect. Her behavior is normal.       Significant Labs:   BMP:   Recent Labs   Lab 07/13/19  0404 07/14/19  0555   * 135*   * 132*   K 4.5 4.0    95    CO2 17* 22*   BUN 49* 40*   CREATININE 4.7* 3.4*   CALCIUM 9.1 9.1   MG 2.5 2.2       Significant Imaging: Echocardiogram:   Transthoracic echo (TTE) complete (Cupid Only):   Results for orders placed or performed during the hospital encounter of 03/10/19   Transthoracic echo (TTE) complete (Cupid Only)   Result Value Ref Range    Ascending aorta 3.62 cm    STJ 3.35 cm    AV mean gradient 1.95 mmHg    Ao peak archie 0.97 m/s    Ao VTI 15.57 cm    IVS 0.74 0.6 - 1.1 cm    LA size 5.12 cm    Left Atrium Major Axis 8.36 cm    Left Atrium Minor Axis 7.43 cm    LVIDD 5.95 3.5 - 6.0 cm    LVIDS 4.76 (A) 2.1 - 4.0 cm    LVOT diameter 2.27 cm    LVOT peak VTI 9.42 cm    PW 0.89 0.6 - 1.1 cm    RA Major Axis 7.69 cm    RA Width 4.73 cm    RVDD 4.49 cm    Sinus 3.84 cm    TAPSE 2.10 cm    TR Max Archie 3.22 m/s    TDI LATERAL 0.09     TDI SEPTAL 0.08     LA WIDTH 4.37 cm    LV Diastolic Volume 176.67 mL    LV Systolic Volume 105.58 mL    RV S' 11.31 m/s    LVOT peak archie [id number] m/s    FS 20 %    LA volume 149.63 cm3    LV mass 187.70 g    Left Ventricle Relative Wall Thickness 0.30 cm    AV valve area 2.45 cm2    AV Velocity Ratio 0.61     AV index (prosthetic) 0.61     Mean e' 0.09     LVOT area 4.05 cm2    LVOT stroke volume 38.10 cm3    AV peak gradient 3.76 mmHg    LV Systolic Volume Index 46.0 mL/m2    LV Diastolic Volume Index 76.94 mL/m2    LA Volume Index 65.2 mL/m2    LV Mass Index 81.7 g/m2    Triscuspid Valve Regurgitation Peak Gradient 41.47 mmHg    BSA 2.39 m2    Right Atrial Pressure (from IVC) 15 mmHg    TV rest pulmonary artery pressure 56 mmHg    Narrative    · Moderate left ventricular enlargement.  · Moderately decreased left ventricular systolic function. The estimated   ejection fraction is 35%  · Moderately reduced right ventricular systolic function.  · Left ventricular diastolic dysfunction.  · Severe biatrial enlargement.  · Mild-to-moderate mitral regurgitation.  · Mild tricuspid  regurgitation.  · Pulmonary hypertension present.  · The estimated PA systolic pressure is 56 mm Hg  · Elevated central venous pressure (15 mm Hg).

## 2019-07-14 NOTE — PLAN OF CARE
Problem: Adult Inpatient Plan of Care  Goal: Plan of Care Review  Outcome: Ongoing (interventions implemented as appropriate)     07/14/19 0613   Plan of Care Review   Plan of Care Reviewed With patient;family     Pt remain free from fall and injuries. Tolerated meds well. Request anxiety meds, given. Blood glucose monitored. VSS. No SOB noted, family stayed over night. Safety maintained. Will monitor.
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
## 2019-07-15 NOTE — TELEPHONE ENCOUNTER
----- Message from Rhonda Shine RN sent at 7/15/2019 12:51 PM CDT -----  Regarding: New Hepatology referral  Patient seen inpatient for elevated LFT's . Extensive medical history. Will need follow up as outpatient.  Will be discharged in next two day. Can she be set up with follow up in 2-3 weeks please?   Thanks  Lou

## 2019-07-15 NOTE — PT/OT/SLP PROGRESS
"Physical Therapy Treatment    Patient Name:  Sisi Medel   MRN:  [id number]    Recommendations:     Discharge Recommendations:  nursing facility, skilled   Discharge Equipment Recommendations: (Bariatric bedside commode and bath bench)   Barriers to discharge: Decreased caregiver support    Assessment:     Sisi Medel is a 58 y.o. female admitted with a medical diagnosis of SDH (subdural hematoma).  She presents with the following impairments/functional limitations:  weakness, impaired endurance, impaired self care skills, impaired functional mobilty, gait instability, impaired balance, decreased upper extremity function, decreased lower extremity function, decreased safety awareness, impaired coordination, edema, impaired skin, impaired cardiopulmonary response to activity. Pt tolerated session well with focus on bed mobility, transfers, and gait training. Pt progressing well this day with pt able to tolerate treatment with decreased anxiousness about OOB mobility. Pt able to ambulate 22 ft in room with RW and Min A with constant verbal cues and encouragement. Pt will continue to benefit from therapy services to address impairments listed above.     Rehab Prognosis: Fair; patient would benefit from acute skilled PT services to address these deficits and reach maximum level of function.    Recent Surgery: * No surgery found *      Plan:     During this hospitalization, patient to be seen 4 x/week to address the identified rehab impairments via gait training, therapeutic activities, therapeutic exercises and progress toward the following goals:    · Plan of Care Expires:  08/03/19    Subjective     Chief Complaint: no c/o  Patient/Family Comments/goals: "I think I did better today. I need to do a little more every time because I want to get back to where I was before I fell."   Pain/Comfort:  · Pain Rating 1: 0/10  · Pain Rating Post-Intervention 1: 0/10      Objective:     Communicated with NSG " prior to session.  Patient found seated EOB with telemetry, oxygen upon PTA entry to room.     General Precautions: Standard, fall   Orthopedic Precautions:RLE weight bearing as tolerated   Braces: (B Darco Shoes)     Functional Mobility:  · Bed Mobility:     · Scooting: stand by assistance  · Transfers:     · Sit to Stand:  moderate assistance and maximal assistance with rolling walker  · Bed to Chair: minimum assistance with  rolling walker  using  Stand Pivot  · Gait: Pt ambulates 22 ft with RW and Min A. Pt with slow merly and increased reliance on BUE support through RW. chair follow behind.       AM-PAC 6 CLICK MOBILITY  Turning over in bed (including adjusting bedclothes, sheets and blankets)?: 4  Sitting down on and standing up from a chair with arms (e.g., wheelchair, bedside commode, etc.): 3  Moving from lying on back to sitting on the side of the bed?: 4  Moving to and from a bed to a chair (including a wheelchair)?: 2  Need to walk in hospital room?: 3  Climbing 3-5 steps with a railing?: 2  Basic Mobility Total Score: 18       Therapeutic Activities and Exercises:   Pt assisted with functional mobility as noted above.     Patient left up in chair with all lines intact, call button in reach and NSG notified.    GOALS:   Multidisciplinary Problems     Physical Therapy Goals        Problem: Physical Therapy Goal    Goal Priority Disciplines Outcome Goal Variances Interventions   Physical Therapy Goal     PT, PT/OT Ongoing (interventions implemented as appropriate)     Description:  Goals to be met by: 2019     Patient will increase functional independence with mobility by performin. Supine to sit with Modified Dairy. Met (2019)  2. Sit to supine with supervision  3. Sit to stand transfer with Contact Guard Assistance with RW and maintaining any weight bearing precautions- Met   Sit to stand transfer with supervision assistance with RW from bedside chair.  4. Bed to chair  transfer with Contact Guard Assistance using Rolling Walker and maintaining any weight bearing precautions. Met (7/9/2019)  4B. Bed to w/c transfer using sliding board and maintaining any weight bearing precautions w/ min assist  5. Gait  x 30 feet with Contact Guard Assistance using Rolling Walker. and maintaining any weight bearing precautions  6. Lower extremity exercise program x20 reps per handout, with assistance as needed                         Time Tracking:     PT Received On: 07/15/19  PT Start Time: 0946     PT Stop Time: 1010  PT Total Time (min): 24 min     Billable Minutes: Gait Training 12 and Therapeutic Activity 12    Treatment Type: Treatment  PT/PTA: PTA     PTA Visit Number: 2     Bernabe Branch, PTA  07/15/2019

## 2019-07-15 NOTE — TREATMENT PLAN
Hepatology Treatment Plan    Sisi Medel is a 58 y.o. female admitted to hospital 7/3/2019 (Hospital Day: 13) due to SDH (subdural hematoma).     Interval History  - continue to down trending LFTs.    Laboratory    Lab Results   Component Value Date    ALBUMIN 2.7 (L) 07/15/2019     (H) 07/15/2019     (H) 07/15/2019    GGT 54 10/24/2018    ALKPHOS 255 (H) 07/15/2019    BILITOT 1.0 07/15/2019       Lab Results   Component Value Date    INR 2.2 (H) 07/15/2019    INR 2.1 (H) 07/14/2019    INR 1.8 (H) 07/13/2019     Plan  - resolving LFTs.  Continue to trend.  - no further inpatient hepatology workup at this time.  - we will plan for outpatient follow-up in 2 weeks with labs.  Will determine at that time ifhepatic biopsy is indicated.  - please obtain daily CBC, BMP, LFT, INR  - Plan of care was discussed with primary team    Thank you for involving us in the care of Sisi Medel. Please call with any additional concerns or questions.    Bladimir Nix MD  Gastroenterology Fellow

## 2019-07-15 NOTE — PLAN OF CARE
Problem: Fall Injury Risk  Goal: Absence of Fall and Fall-Related Injury    Intervention: Identify and Manage Contributors to Fall Injury Risk     07/09/19 2000 07/14/19 1919   Identify and Manage Contributors to Fall Injury Risk   Self-Care Promotion independence encouraged  --    Manage Acute Allergic Reaction   Medication Review/Management  --  medications reviewed   Pt remain safe this shift free from falls, safety measures effective

## 2019-07-15 NOTE — PLAN OF CARE
Problem: Adult Inpatient Plan of Care  Goal: Plan of Care Review  Outcome: Ongoing (interventions implemented as appropriate)     07/15/19 3812   Plan of Care Review   Plan of Care Reviewed With patient     Pt remain free from fall and injuries. Denies any pain and discomfort, had an uneventful night, slept well. VSS. No SOB noted. Bed locked and lowest position. Call light to easy reach.Will monitor.

## 2019-07-15 NOTE — PLAN OF CARE
Problem: Physical Therapy Goal  Goal: Physical Therapy Goal  Goals to be met by: 2019     Patient will increase functional independence with mobility by performin. Supine to sit with Modified [place]. Met (2019)  2. Sit to supine with supervision  3. Sit to stand transfer with Contact Guard Assistance with RW and maintaining any weight bearing precautions- Met   Sit to stand transfer with supervision assistance with RW from bedside chair.  4. Bed to chair transfer with Contact Guard Assistance using Rolling Walker and maintaining any weight bearing precautions. Met (2019)  4B. Bed to w/c transfer using sliding board and maintaining any weight bearing precautions w/ min assist  5. Gait  x 30 feet with Contact Guard Assistance using Rolling Walker. and maintaining any weight bearing precautions  6. Lower extremity exercise program x20 reps per handout, with assistance as needed        Outcome: Ongoing (interventions implemented as appropriate)  Goals remain appropriate.

## 2019-07-15 NOTE — PT/OT/SLP PROGRESS
Occupational Therapy   Treatment    Name: Sisi Medel  MRN: [id number]  Admitting Diagnosis:  SDH (subdural hematoma)       Recommendations:     Discharge Recommendations: nursing facility, skilled  Discharge Equipment Recommendations:  (Bariatric bedside commode and bath bench)  Barriers to discharge:  None    Assessment:     Sisi Medel is a 58 y.o. female with a medical diagnosis of SDH (subdural hematoma).  She presents with improved tolerance for OOB activity and strong motivation to participate w/ therapy. Performance deficits affecting function are weakness, impaired endurance, impaired self care skills, impaired functional mobilty, gait instability, impaired balance, decreased upper extremity function, decreased lower extremity function, decreased safety awareness, impaired coordination, edema, impaired skin, impaired cardiopulmonary response to activity.     Rehab Prognosis:  Fair; patient would benefit from acute skilled OT services to address these deficits and reach maximum level of function.       Plan:     Patient to be seen 3 x/week to address the above listed problems via self-care/home management, therapeutic activities, therapeutic exercises  · Plan of Care Expires: 08/02/19  · Plan of Care Reviewed with: patient    Subjective     Pain/Comfort:  · Pain Rating 1: 0/10  · Pain Addressed 1: Reposition, Distraction  · Pain Rating Post-Intervention 1: 0/10    Objective:     Communicated with: RN prior to session.  Patient found up in chair with telemetry, oxygen upon OT entry to room.    General Precautions: Standard, fall   Orthopedic Precautions:RLE weight bearing as tolerated   Braces: (B Darco shoes)     Occupational Performance:     Bed Mobility:    · Patient completed Rolling/Turning to Left with  stand by assistance  · Patient completed Scooting/Bridging with minimum assistance  · Patient completed Sit to Supine with moderate assistance and with leg lift     Functional  Mobility/Transfers:  · Patient completed Sit <> Stand Transfer with moderate assistance  with  hand-held assist   · Patient completed Bed <> Chair Transfer using Stand Pivot technique with moderate assistance with hand-held assist  · Functional Mobility: Able to take 3 steps toward the bed w/ Mod A and HHA.     Activities of Daily Living:  · Grooming: supervision seated on EOB w/ setup.      [facility] 6 Click ADL: 16    Treatment & Education:  -Pt edu on OT role/POC  -Importance of OOB activity with staff assistance  -Safety during functional t/f and mobility  - White board updated  - Multiple self care tasks/functional mobility completed-- assistance level noted above  - All questions/concerns answered within OT scope of practice    Reviewed stress management techniques included deep breathing and signing to music.    Patient left HOB elevated with all lines intact, call button in reach and RN notifiedEducation:      GOALS:   Multidisciplinary Problems     Occupational Therapy Goals        Problem: Occupational Therapy Goal    Goal Priority Disciplines Outcome Interventions   Occupational Therapy Goal     OT, PT/OT Ongoing (interventions implemented as appropriate)    Description:  Goals to be met by: 7/14/19     Patient will increase functional independence with ADLs by performing:    UE Dressing with Stand-by Assistance. Met 7/5  Revised: UE dressing with supervision..  Grooming while seated with Supervision. MET 7/12/2019 SAMMIE  Toileting from bedside commode with Minimal Assistance for hygiene and clothing management.   Supine to sit with Supervision. progressing  Toilet transfer to bedside commode with Contact Guard Assistance. Progressing (Mod A)                        Time Tracking:     OT Date of Treatment: 07/15/19  OT Start Time: 1132  OT Stop Time: 1201  OT Total Time (min): 29 min    Billable Minutes:Self Care/Home Management 29 min    García Mabry, OT  7/15/2019

## 2019-07-15 NOTE — PT/OT/SLP PROGRESS
"Speech Language Pathology Treatment    Patient Name:  Sisi Medel   MRN:  [id number]  Admitting Diagnosis: SDH (subdural hematoma)    Recommendations:                 General Recommendations:  Cognitive-linguistic therapy  Diet recommendations:  Regular, Liquid Diet Level: Thin   Aspiration Precautions: Standard aspiration precautions   General Precautions: Standard, fall  Communication strategies:  none    Subjective     Patient awake;alert. Seated on EOB upon ST entry.   "I'm feeling very frightened I'm ready to go back to there" - patient unable to state "where" and what is frightening her.     Pain/Comfort:  · Pain Rating 1: 0/10    Objective:     Has the patient been evaluated by SLP for swallowing?   Yes  Keep patient NPO? No   Current Respiratory Status: nasal cannula      Patient completed 3 item mental manipulations with 100% acc. Accuracy decreased to 50% indep upon 4 item tasks, though improved given reps of stim.  Patient recalled mock 7 digit phone numbers with 10% acc indep, improving to 60% acc given reps of stim. Orientation tasks completed x4 indep.   Cont ST POC.     Assessment:     Sisi Medel is a 58 y.o. female with an SLP diagnosis of Cognitive-Linguistic Impairment.    Goals:   Multidisciplinary Problems     SLP Goals        Problem: SLP Goal    Goal Priority Disciplines Outcome   SLP Goal     SLP Revised   Description:  Goals expected to be met by 7/11:   1. Pt will name >10 items in concrete category within one minute IND. -MET 7/5  2. Pt will complete mental manipulation of 4 word sets with 90% acc given min cues.   3. Pt will complete functional math word problems (time/money) with 80% acc given min cues. Goal met  4. Pt will utilize memory strategies to recall 3 units of novel information after 3-5 minute delay with min cues.  Goal met  5. Pt will participate in further assessment of reading, writing, and cognition.       Updated goals expected to be met 7/18:   1. Pt " will complete mental manipulation of 4 word sets with 90% acc given min cues.   2. Pt will complete functional math word problems (time/money) with 80% acc.  3. Pt will utilize memory strategies to recall 3 units of novel information after 5 minute delay with modified independence.   4. Pt will participate in further assessment of reading, writing, and cognition.                         Plan:     · Patient to be seen:  2 x/week   · Plan of Care expires:  08/02/19  · Plan of Care reviewed with:  patient   · SLP Follow-Up:  Yes       Discharge recommendations:  nursing facility, skilled   Barriers to Discharge:  None    Time Tracking:     SLP Treatment Date:   07/15/19  Speech Start Time:  0938  Speech Stop Time:  0954     Speech Total Time (min):  16 min    Billable Minutes: Speech Therapy Individual 16    Emily Abadie, CCC-SLP  07/15/2019

## 2019-07-15 NOTE — PLAN OF CARE
Problem: SLP Goal  Goal: SLP Goal  Goals expected to be met by 7/11:   1. Pt will name >10 items in concrete category within one minute IND. -MET 7/5  2. Pt will complete mental manipulation of 4 word sets with 90% acc given min cues.   3. Pt will complete functional math word problems (time/money) with 80% acc given min cues. Goal met  4. Pt will utilize memory strategies to recall 3 units of novel information after 3-5 minute delay with min cues.  Goal met  5. Pt will participate in further assessment of reading, writing, and cognition.       Updated goals expected to be met 7/18:   1. Pt will complete mental manipulation of 4 word sets with 90% acc given min cues.   2. Pt will complete functional math word problems (time/money) with 80% acc.  3. Pt will utilize memory strategies to recall 3 units of novel information after 5 minute delay with modified independence.   4. Pt will participate in further assessment of reading, writing, and cognition.        Goals remain appropriate.   Emily P. Abadie M.S., CCC-SLP  Speech Language Pathologist  [phone]  07/15/2019

## 2019-07-15 NOTE — SUBJECTIVE & OBJECTIVE
"Interval History: Patient seen and examined at bedside. Looking well, sitting up eating breakfast. "I'm feeling good". CTH stable. Echo showing worsened CHF, rEF 25%. Awaiting cards recs and SNF.     Review of Systems   Constitutional: Positive for activity change, appetite change and fatigue.   HENT: Negative.    Eyes: Negative.    Respiratory: Positive for shortness of breath (chronic).         On home oxygen / O2 via nasal cannula   Cardiovascular: Positive for chest pain.   Gastrointestinal: Positive for abdominal pain. Negative for constipation, diarrhea, nausea and vomiting.   Endocrine: Negative.    Genitourinary: Negative.    Musculoskeletal:        R forearm pain and numbness   Skin: Positive for color change (hematoma).   Allergic/Immunologic: Negative.    Neurological: Positive for weakness.   Psychiatric/Behavioral: The patient is nervous/anxious.      Objective:     Vital Signs (Most Recent):  Temp: 97 °F (36.1 °C) (07/15/19 1136)  Pulse: 68 (07/15/19 1136)  Resp: 18 (07/15/19 1136)  BP: 110/80 (07/15/19 1136)  SpO2: 100 % (07/15/19 1136) Vital Signs (24h Range):  Temp:  [96.4 °F (35.8 °C)-97.8 °F (36.6 °C)] 97 °F (36.1 °C)  Pulse:  [66-71] 68  Resp:  [16-20] 18  SpO2:  [94 %-100 %] 100 %  BP: ()/(59-80) 110/80     Weight: 109.8 kg (242 lb)  Body mass index is 35.74 kg/m².    Intake/Output Summary (Last 24 hours) at 7/15/2019 1258  Last data filed at 7/15/2019 0638  Gross per 24 hour   Intake 120 ml   Output 0 ml   Net 120 ml      Physical Exam   Constitutional: She is oriented to person, place, and time. She appears well-developed and well-nourished.   obese   HENT:   Head: Normocephalic.   Eyes: Pupils are equal, round, and reactive to light. Conjunctivae and EOM are normal. Right eye exhibits no discharge. Left eye exhibits no discharge. No scleral icterus.   Cardiovascular: Normal rate and regular rhythm.   Pulmonary/Chest: Effort normal. Tachypnea noted. She has rhonchi.   On oxygen via nasal " cannula   Abdominal: Soft. She exhibits no distension. There is tenderness.   Musculoskeletal: Normal range of motion. She exhibits no edema.   Neurological: She is alert and oriented to person, place, and time. She displays normal reflexes. No cranial nerve deficit or sensory deficit. She exhibits normal muscle tone. Coordination normal.   Psychiatric: Judgment normal. Her affect is labile. She exhibits a depressed mood.     Significant Labs:   CBC:   Recent Labs   Lab 07/14/19 0555 07/15/19  0518   WBC 15.04* 15.64*   HGB 8.3* 8.5*   HCT 28.3* 29.6*   * 169     CMP:   Recent Labs   Lab 07/14/19  0555 07/15/19  0518   * 130*   K 4.0 4.7   CL 95 95   CO2 22* 19*   * 102   BUN 40* 60*   CREATININE 3.4* 5.0*   CALCIUM 9.1 8.8   PROT 6.8 6.9   ALBUMIN 2.6* 2.7*   BILITOT 1.2* 1.0   ALKPHOS 297* 255*   * 105*   * 248*   ANIONGAP 15 16   EGFRNONAA 14.3* 9.0*       Significant Imaging: I have reviewed and interpreted all pertinent imaging results/findings within the past 24 hours.

## 2019-07-15 NOTE — PLAN OF CARE
07/15/19 1521   Discharge Reassessment   Assessment Type Discharge Planning Reassessment   Provided patient/caregiver education on the expected discharge date and the discharge plan Yes   Do you have any problems affording any of your prescribed medications? No   Discharge Plan A Skilled Nursing Facility   Discharge Plan B Home Health   DME Needed Upon Discharge  none   Patient choice form signed by patient/caregiver Yes   Anticipated Discharge Disposition SNF   Can the patient answer the patient profile reliably? Yes, cognitively intact   How does the patient rate their overall health at the present time? Fair   Describe the patient's ability to walk at the present time. Walks with the help of equipment   How often would a person be available to care for the patient? Often   Number of comorbid conditions (as recorded on the chart) Four   During the past month, has the patient often been bothered by feeling down, depressed or hopeless? No   During the past month, has the patient often been bothered by little interest or pleasure in doing things? No   Post-Acute Status   Post-Acute Authorization Placement   Post-Acute Placement Status Pending Post-Acute Clinical Review

## 2019-07-15 NOTE — SUBJECTIVE & OBJECTIVE
Interval History:    Review of Systems   Constitution: Negative for chills, decreased appetite, diaphoresis and fever.   HENT: Negative.    Eyes: Positive for vision loss in right eye.   Cardiovascular: Positive for dyspnea on exertion and leg swelling. Negative for chest pain, irregular heartbeat, near-syncope and palpitations.   Respiratory: Positive for shortness of breath. Negative for cough and hemoptysis.    Gastrointestinal: Negative for change in bowel habit, hematochezia, melena, nausea and vomiting.   Neurological: Negative for weakness.     Objective:     Vital Signs (Most Recent):  Temp: 97 °F (36.1 °C) (07/15/19 1136)  Pulse: 68 (07/15/19 1136)  Resp: 18 (07/15/19 1136)  BP: 110/80 (07/15/19 1136)  SpO2: 100 % (07/15/19 1136) Vital Signs (24h Range):  Temp:  [96.4 °F (35.8 °C)-97.8 °F (36.6 °C)] 97 °F (36.1 °C)  Pulse:  [66-71] 68  Resp:  [16-20] 18  SpO2:  [94 %-100 %] 100 %  BP: ()/(59-80) 110/80     Weight: 109.8 kg (242 lb)  Body mass index is 35.74 kg/m².     SpO2: 100 %  O2 Device (Oxygen Therapy): nasal cannula      Intake/Output Summary (Last 24 hours) at 7/15/2019 1300  Last data filed at 7/15/2019 0638  Gross per 24 hour   Intake 120 ml   Output 0 ml   Net 120 ml       Lines/Drains/Airways     Drain                 Hemodialysis AV Graft Left upper arm -- days          Peripheral Intravenous Line                 Peripheral IV - Single Lumen 07/11/19 1500 20 G;1 3/4 in Right Forearm 3 days                Physical Exam   Constitutional: She appears well-developed.   HENT:   Head: Normocephalic and atraumatic.   Neck: Normal range of motion. Neck supple.   Cardiovascular: Normal rate, regular rhythm, intact distal pulses and normal pulses. Exam reveals gallop, S3 and distant heart sounds.   Pulses:       Carotid pulses are 2+ on the right side, and 2+ on the left side.  1-2/6 early systolic murmur more pronounced in the aortic region   Pulmonary/Chest: Effort normal. She has rales.   Rales  in bilateral lung bases   Abdominal: Soft. Bowel sounds are normal.   Neurological: She is alert.       Significant Labs:   CMP   Recent Labs   Lab 07/14/19  0555 07/15/19  0518   * 130*   K 4.0 4.7   CL 95 95   CO2 22* 19*   * 102   BUN 40* 60*   CREATININE 3.4* 5.0*   CALCIUM 9.1 8.8   PROT 6.8 6.9   ALBUMIN 2.6* 2.7*   BILITOT 1.2* 1.0   ALKPHOS 297* 255*   * 105*   * 248*   ANIONGAP 15 16   ESTGFRAFRICA 16.5* 10.3*   EGFRNONAA 14.3* 9.0*   , CBC   Recent Labs   Lab 07/14/19 0555 07/15/19  0518   WBC 15.04* 15.64*   HGB 8.3* 8.5*   HCT 28.3* 29.6*   * 169   , Troponin No results for input(s): TROPONINI in the last 48 hours. and All pertinent lab results from the last 24 hours have been reviewed.    Significant Imaging: Echocardiogram:   2D echo with color flow doppler:   Results for orders placed or performed during the hospital encounter of 08/15/18   2D echo with color flow doppler   Result Value Ref Range    QEF 35 (A) 55 - 65    Mitral Valve Regurgitation MILD TO MODERATE     Est. PA Systolic Pressure 37.11     Narrative    Date of Procedure: 08/15/2018        TEST DESCRIPTION   Technical Quality: This is a technically challenging study.     General: A catheter is present in the right-sided cardiac chambers.     Aorta: The aortic root is normal in size, measuring 3.0 cm at sinotubular junction and 3.2 cm at Sinuses of Valsalva. The proximal ascending aorta is normal in size, measuring 2.8 cm across.     Left Atrium: The left atrial volume index is severely enlarged, measuring 55.25 cc/m2.     Left Ventricle: The left ventricle is mildly enlarged, with an end-diastolic diameter of 5.8 cm, and an end-systolic diameter of 4.7 cm. LV wall thickness is normal, with the septum and the posterior wall each measuring 0.8 cm across. Relative wall   thickness was normal at 0.28, and the LV mass index was 88.7 g/m2 consistent with normal left ventricular mass. The inferolateral wall is  akinetic. The inferior wall is akinetic. Left ventricular systolic function appears moderately depressed. Visually   estimated ejection fraction is 35-40%. The LV Doppler derived stroke volume equals 53.0 ccs.     Diastolic indices: No distinct A waves were identified on assessment of mitral inflow. E/e' ratio(avg) 9.     Right Atrium: The right atrium is enlarged, measuring 6.7 cm in length and 4.4 cm in width in the apical view.     Right Ventricle: The right ventricle is enlarged measuring 4.2 cm at the base in the apical right ventricle-focused view. Global right ventricular systolic function appears normal. There is abnormal septal motion. Tricuspid annular plane systolic   excursion (TAPSE) is 1.6 cm. Tissue Doppler-derived tricuspid annular peak systolic velocity (S prime) is 9.0 cm/s. The estimated PA systolic pressure is 37 mmHg.     Mitral Valve:  There is mild to moderate mitral regurgitation. MR is eccentric and directed posteriorly      IVC: IVC is normal in size and collapses > 50% with a sniff, suggesting normal right atrial pressure of 3 mmHg.     Intracavitary: There is no evidence of pericardial effusion, intracavity mass, thrombi, or vegetation.         CONCLUSIONS     1 - Moderately depressed left ventricular systolic function (EF 35-40%).     2 - Wall motion abnormalities.     3 - Biatrial enlargement.     4 - Right ventricular enlargement with normal systolic function.     5 - The estimated PA systolic pressure is 37 mmHg.     6 - Mild to moderate mitral regurgitation.             This document has been electronically    SIGNED BY: Frances Collins MD On: 08/15/2018 09:44          ASSESSMENT AND PLAN    CHF (congestive heart failure)  - Patient seen and examined  - Transthoracic echo (TTE) 2D with Color Flow revealed severely decreased LV systolic function with EF of 25%  - BNP levels obtained on  7/14/19 was 3,209  - Some trace edema on LE +1,  JVD was present with HJ reflex on my exam  -CXR  (similar to last one mild fluid mostly interstitital changes from sarcoid) O2 requirement same  - AST/ALT was elevated back on 5/27 as well trended down and is trending down no was well, Tbili down from 2.7 to 1.2     Based on pt's symptoms, highly elevated BNP along with the echo cardiogram, pt's condition does have a cardiovascular component to it.  - Continue volume removal with dilaysis  - Chronic hypotension on midodrine and florinef. Warm on my exam  - May be difficult due to hypotension to start her on GDMT for heart failure...   Advice to start patient on lisinopril taper upon discharge   Start with 2.5 mg and increase dose by 2.5 mg biweekly.

## 2019-07-15 NOTE — PROGRESS NOTES
[facility]  Cardiology  Progress Note    Patient Name: Sisi Medel  MRN: [id number]  Admission Date: 7/3/2019  Hospital Length of Stay: 12 days  Code Status: Full Code   Attending Physician: Gladis Cordero*   Primary Care Physician: Carlos A Caputo MD  Expected Discharge Date: 7/15/2019  Principal Problem:SDH (subdural hematoma)    Subjective:     Hospital Course:     58 year old female admitted on 7/3/19 ( hospital day 13) due to Subdural hematoma. She has PMHx of NICM HFrEF (7/2019 EF 25%), perm Afib/flutter s/p 6/2017 AVN ablation & [facility] CRT-P, ESRD (HD TTS-last dialysis yesterday with removal of 3 L of fluid), Type 2 DM (5/30/19 A1c 6.5%), pulmonary HTN,  orthostatic hypotension, seizures (on keppra 500 mg Bid),  wheel chair bound admitted to [facility] s/p fall from wheel chair with right SDH.   Cardiology is consulted for elevated AST/ALT ON labs with negative hepatic work up.   Recent labs reveal a downward trend of pt's transaminases.   4/15/2019  Pt reports feeling more short of breath today. She seemed winded when she attempted to seat up from the bed to the chair. Denies chest pain     Review of Systems   Constitution: Negative for chills, decreased appetite and diaphoresis.   HENT: Negative for congestion and ear discharge.    Eyes: Negative for blurred vision and discharge.   Cardiovascular: Positive for dyspnea on exertion and leg swelling. Negative for chest pain, irregular heartbeat and paroxysmal nocturnal dyspnea.   Respiratory: Negative for cough, hemoptysis and shortness of breath.    Gastrointestinal: Negative for abdominal pain.   Objective:      Vital Signs (Most Recent):  Temp: 96.3 °F (35.7 °C) (07/14/19 0815)  Pulse: 70 (07/14/19 0815)  Resp: 18 (07/14/19 0815)  BP: (!) 85/57 (07/14/19 0815)  SpO2: 96 % (07/14/19 0815) Vital Signs (24h Range):  Temp:  [96.3 °F (35.7 °C)-97.8 °F (36.6 °C)] 96.3 °F (35.7 °C)  Pulse:  [66-73] 70  Resp:  [17-20] 18  SpO2:   [93 %-98 %] 96 %  BP: ()/(57-69) 85/57      Weight: 110 kg (242 lb 8.1 oz)  Body mass index is 36.87 kg/m².     SpO2: 96 %  O2 Device (Oxygen Therapy): nasal cannula        Intake/Output Summary (Last 24 hours) at 7/14/2019 1133  Last data filed at 7/14/2019 0900      Gross per 24 hour   Intake 360 ml   Output 1 ml   Net 359 ml       Physical Exam   Constitutional: She is oriented to person, place, and time. She appears well-developed and well-nourished. No distress.   Eyes: Pupils are equal, round, and reactive to light. Conjunctivae are normal.   Neck: No tracheal deviation present. No thyromegaly present.   Cardiovascular: Normal rate, regular rhythm, normal heart sounds and intact distal pulses. Exam reveals no gallop and no friction rub.   No murmur heard.  Pulses:       Radial pulses are 2+ on the right side, and 2+ on the left side.        Femoral pulses are 2+ on the right side, and 2+ on the left side.  Pulmonary/Chest: Effort normal. No respiratory distress. She has decreased breath sounds. She has no wheezes. She has no rales.   Abdominal: Soft. Bowel sounds are normal. She exhibits no distension. There is tenderness.   Musculoskeletal: She exhibits edema. She exhibits no deformity.   Neurological: She is alert and oriented to person, place, and time. No cranial nerve deficit. Coordination normal.   Skin: Skin is warm and dry. She is not diaphoretic.   Psychiatric: She has a normal mood and affect. Her behavior is normal.     Significant Labs:   CMP        Recent Labs   Lab 07/14/19  0555 07/15/19  0518   * 130*   K 4.0 4.7   CL 95 95   CO2 22* 19*   * 102   BUN 40* 60*   CREATININE 3.4* 5.0*   CALCIUM 9.1 8.8   PROT 6.8 6.9   ALBUMIN 2.6* 2.7*   BILITOT 1.2* 1.0   ALKPHOS 297* 255*   * 105*   * 248*   ANIONGAP 15 16   ESTGFRAFRICA 16.5* 10.3*   EGFRNONAA 14.3* 9.0*   , CBC        Recent Labs   Lab 07/14/19  0555 07/15/19  0518   WBC 15.04* 15.64*   HGB 8.3* 8.5*   HCT  28.3* 29.6*   * 169   , Troponin No results for input(s): TROPONINI in the last 48 hours. and All pertinent lab results from the last 24 hours have been reviewed.     Significant Imaging: Echocardiogram:   2D echo with color flow doppler:         Results for orders placed or performed during the hospital encounter of 08/15/18   2D echo with color flow doppler   Result Value Ref Range     QEF 35 (A) 55 - 65     Mitral Valve Regurgitation MILD TO MODERATE       Est. PA Systolic Pressure 37.11       Narrative     Date of Procedure: 08/15/2018     CONCLUSIONS     1 - Moderately depressed left ventricular systolic function (EF 35-40%).     2 - Wall motion abnormalities.     3 - Biatrial enlargement.     4 - Right ventricular enlargement with normal systolic function.     5 - The estimated PA systolic pressure is 37 mmHg.     6 - Mild to moderate mitral regurgitation.             Assessment and Plan:     Brief HPI:  58 y.o female admited on 7/3/19 ( hosp day 13) due to subdural hematoma. Cardiology was consulted for elevated AST/ALT on labs with negative haptic workup. Transaminases have been downtrending     CHF (congestive heart failure)  - Patient seen and examined  - Transthoracic echo (TTE) 2D with Color Flow revealed severely decreased LV systolic function with EF of 25%  - BNP levels obtained on  7/14/19 was 3,209  - Some trace edema on LE +1,  JVD was present with HJ reflex on my exam  -CXR (similar to last one mild fluid mostly interstitital changes from sarcoid) O2 requirement same  - AST/ALT was elevated back on 5/27 as well trended down and is trending down no was well,      Based on pt's symptoms, highly elevated BNP along with the echo cardiogram, pt's condition does have a cardiovascular component to it.  - Continue volume removal with dilaysis  - Chronic hypotension on midodrine and florinef. Warm on my exam  - May be difficult due to hypotension to start her on GDMT for heart failure...   Advice to  start patient on lisinopril taper upon discharge   Start with 2.5 mg and increase dose by 2.5 mg biweekly after evaluation.      VTE Risk Mitigation (From admission, onward)        Ordered     warfarin (COUMADIN) tablet 2.5 mg  Every Sunday 07/08/19 1429     warfarin (COUMADIN) tablet 5 mg  Every Tues, Thurs, Sat      07/08/19 1429     warfarin (COUMADIN) tablet 2.5 mg  Every Mon, Wed, Fri 07/08/19 1429          Ruben Lindsay MD  Cardiology  [facility]

## 2019-07-15 NOTE — HOSPITAL COURSE
58 year old female admitted on 7/3/19 ( hospital day 13) due to Subdural hematoma. She has PMHx of NICM HFrEF (7/2019 EF 25%), perm Afib/flutter s/p 6/2017 AVN ablation & [facility] CRT-P, ESRD (HD TTS-last dialysis yesterday with removal of 3 L of fluid), Type 2 DM (5/30/19 A1c 6.5%), pulmonary HTN,  orthostatic hypotension, seizures (on keppra 500 mg Bid),  wheel chair bound admitted to [facility] s/p fall from wheel chair with right SDH.   Cardiology is consulted for elevated AST/ALT ON labs with negative hepatic work up.   Recent labs reveal a downward trend of pt's transaminases.   4/15/2019  Pt reports feeling more short of breath today. She seemed winded when she attempted to seat up from the bed to the chair. Denies chest pain

## 2019-07-16 NOTE — ASSESSMENT & PLAN NOTE
--present on admission  --trending up, and new.  --liver U/S unremarkable, hep panel -ve, HIV -ve  --Hepatology consulted; running AI markers KAYLAH, IgG, ASMA, AMA (+ve). However, suspect this is likely due to congestive hepatopathy given volume overload, elevated BNP, rEF on 2D Echo and high R heart pressures. Cardiology service consulted for mgmt recommendations. However, current plan is to continue HD as is; she would be difficult to implement GDMT due to baseline hypotension. F/u with cardiology re: recs.

## 2019-07-16 NOTE — SUBJECTIVE & OBJECTIVE
Interval History: Patient seen and examined at bedside while on dialysis. States she feels well today. Does endorse mild headache, which has come and gone throughout hospitalization and is no worse today. Responds to tylenol.    Review of Systems   Constitutional: Positive for activity change, appetite change and fatigue.   HENT: Negative.    Eyes: Negative.    Respiratory: Positive for shortness of breath (chronic).         On home oxygen / O2 via nasal cannula   Cardiovascular: Positive for chest pain.   Gastrointestinal: Positive for abdominal pain. Negative for constipation, diarrhea, nausea and vomiting.   Endocrine: Negative.    Genitourinary: Negative.    Musculoskeletal:        R forearm pain and numbness   Skin: Positive for color change (hematoma).   Allergic/Immunologic: Negative.    Neurological: Positive for weakness.   Psychiatric/Behavioral: The patient is nervous/anxious.      Objective:     Vital Signs (Most Recent):  Temp: 97.4 °F (36.3 °C) (07/16/19 1300)  Pulse: 69 (07/16/19 1300)  Resp: 18 (07/16/19 1300)  BP: 94/61 (07/16/19 1300)  SpO2: 98 % (07/16/19 1300) Vital Signs (24h Range):  Temp:  [97.1 °F (36.2 °C)-97.9 °F (36.6 °C)] 97.4 °F (36.3 °C)  Pulse:  [53-78] 69  Resp:  [16-18] 18  SpO2:  [91 %-98 %] 98 %  BP: ()/(56-84) 94/61     Weight: 109.8 kg (242 lb)  Body mass index is 35.74 kg/m².    Intake/Output Summary (Last 24 hours) at 7/16/2019 1410  Last data filed at 7/16/2019 1145  Gross per 24 hour   Intake 650 ml   Output 2650 ml   Net -2000 ml      Physical Exam   Constitutional: She is oriented to person, place, and time. She appears well-developed and well-nourished.   obese   HENT:   Head: Normocephalic.   Eyes: Pupils are equal, round, and reactive to light. Conjunctivae and EOM are normal. Right eye exhibits no discharge. Left eye exhibits no discharge. No scleral icterus.   Cardiovascular: Normal rate and regular rhythm.   Pulmonary/Chest: Effort normal. No tachypnea. She has  rhonchi.   On oxygen via nasal cannula   Abdominal: Soft. She exhibits no distension.   Musculoskeletal: Normal range of motion. She exhibits no edema.   Neurological: She is alert and oriented to person, place, and time. She displays normal reflexes. No cranial nerve deficit or sensory deficit. She exhibits normal muscle tone. Coordination normal.   Psychiatric: Judgment normal. Her affect is labile. She exhibits a depressed mood.     Significant Labs:   CBC:   Recent Labs   Lab 07/15/19  0518 07/16/19  0414   WBC 15.64* 13.58*   HGB 8.5* 8.3*   HCT 29.6* 29.8*    151     CMP:   Recent Labs   Lab 07/15/19  0518 07/16/19 0414   * 128*   K 4.7 5.7*   CL 95 91*   CO2 19* 15*    87   BUN 60* 86*   CREATININE 5.0* 6.8*   CALCIUM 8.8 9.1   PROT 6.9 7.2   ALBUMIN 2.7* 2.9*   BILITOT 1.0 1.0   ALKPHOS 255* 283*   * 77*   * 198*   ANIONGAP 16 22*   EGFRNONAA 9.0* 6.2*       Significant Imaging: I have reviewed and interpreted all pertinent imaging results/findings within the past 24 hours.

## 2019-07-16 NOTE — ASSESSMENT & PLAN NOTE
--pt at home on warfarin- held on admission due to ICH.  --restarted on 7/8 but will hold again today due to supratherapeutic INR  --Daily INR

## 2019-07-16 NOTE — PT/OT/SLP PROGRESS
Physical Therapy  Pt Not Seen    Patient Name:  Sisi Medel   MRN:  [id number]    Patient not seen today secondary to  Dialysis(Pt BRAYAN this am for HD.  PTA unable to make 2nd attempt). Will follow-up on next scheduled visit.    Keri Campos, PTA  7/16/2019

## 2019-07-16 NOTE — ASSESSMENT & PLAN NOTE
--s/p fall at home from wheelchair  --XR alia pelvis-No acute displaced fracture-dislocation identified.  --XR L femur-Visualized osseous structures appear intact, with no definite evidence of recent fracture or other significant abnormality identified.  -XR L knee-No evidence of recent fracture or other significant detrimental interval change since the examinations referenced above.  Old healed left proximal fibular shaft fracture is noted.   --b/l 4th toe fractures, managing with darco shoe, per podiatry.  --fall precautions  --PT/OT, OOB. Recommended SNF.  --SW/CM following; pending accepting facility.

## 2019-07-16 NOTE — PLAN OF CARE
Problem: Adult Inpatient Plan of Care  Goal: Plan of Care Review  Outcome: Ongoing (interventions implemented as appropriate)     07/16/19 0533   Plan of Care Review   Plan of Care Reviewed With patient   Progress improving   Pt A+Ox4, calm and cooperative throughout shift, anxious and tearful at times, prn ativan given to good effect. Repositioning Q2H, sacral dressing and BLE dressings changed this AM. AVF to LUE w/ +thrill +bruit, anticipating HD today. PRN tylenol given for pain. Tolerating diet, denies n/v, ACHS, 1L FR maintained. Safety maintained throughout shift.

## 2019-07-16 NOTE — ASSESSMENT & PLAN NOTE
-H&H stable on admit.  Oozing, bleeding noted from RUE wound. Elevated INR noted. Nursing instructed to place pressure bandage. No other overt bleeding.   - 1u pRBC ordered 7/7, but was unable to complete due to possible infiltration/pain in IV and inability to establish other access.   - Consulted PICC team.  -Additional pRBC transfused 7/11 for Hg<7

## 2019-07-16 NOTE — ASSESSMENT & PLAN NOTE
--  Neurosurgery consulted-no surg intevention  -- CT Head revealed--Interval development of a thin hyperdense extra-axial collection overlying the right tentorium cerebella most compatible with acute subdural hemorrhage.  No significant mass effect or midline shift. Rpt CT 7/8 stable.  --  Managed medically with neuro checks and observation, BP mgmt. She was given DDAVP and Vit K.  --  Pt was on Coumadin and Plavix at home, INR on admit 3.4. AC initially held, subsequently restarted (Warfarin 5mg qT/Th/Sa and 2.5mg all other days) ordered on 7/8. She became therapeutic 7/14 and CTH was repeated (and stable).

## 2019-07-16 NOTE — ASSESSMENT & PLAN NOTE
- volume control with HD  - ideally would have on GDMT; however, ability to titrate medications is limited 2/2 hypotension  - will add on low dose lisinopril 2.5mg daily and initiate slow titration per Cards recs

## 2019-07-16 NOTE — PROGRESS NOTES
[facility]YSIS [facility]     Patient currently on hemodialysis for removal of uremic toxins and volume.     Patient seen and evaluated on hemodialysis, tolerating treatment, see HD flowsheet for vitals and assessments.      Ultrafiltration goal is 1-2L as tolerated (patient was unable to be weighed), keep map >65     Labs have been reviewed and the dialysate bath has been adjusted.     Assessment/Plan:    -Patient seen on HD, tolerating treatment well, w/o complaints   -Renal diet  -Strict I/O's and daily weights  -Daily renal function panels  -Albumin 2.9, will start novasource renal nutritional supplements   -Hbg 8.3, continue Epo with HD   -Phos 5.6, continue renvela with meals   -Will continue to follow while inpatient       AYAN Levine, AGNP-C  Nephrology  Pager:  [phone]
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
## 2019-07-17 NOTE — PLAN OF CARE
[facility]    HOME HEALTH ORDERS  FACE TO FACE ENCOUNTER    Patient Name: Sisi Medel  YOB: 1960    PCP: Carlos A Caputo MD   PCP Address: [address] / [facility] [address]  PCP Phone Number: [phone]  PCP Fax: [phone]    Encounter Date: 07/19/2019    Admit to Home Health    Diagnoses:  Active Hospital Problems    Diagnosis  POA    *SDH (subdural hematoma) [S06.5X9A]  Yes    Foot ulcer [L97.509]  Yes    Fall [W19.XXXA]  Yes    Seizure prophylaxis [Z29.8]  Not Applicable    Fall at home, initial encounter [W19.XXXA, Y92.009]  Not Applicable    Obese [E66.9]  Yes    Transaminitis [R74.0]  Yes    Paroxysmal A-fib [I48.0]  Yes    Anemia in ESRD (end-stage renal disease) [N18.6, D63.1]  Yes    Ulcer of lower limb [L97.909]  Yes    Orthostatic hypotension [I95.1]  Yes    ESRD on hemodialysis [N18.6, Z99.2]  Not Applicable    Biventricular cardiac pacemaker in situ [Z95.0]  Yes    Weakness generalized [R53.1]  Yes    Acute on chronic systolic congestive heart failure [I50.23]  Yes    Hyperlipidemia [E78.5]  Yes    Epilepsy [G40.909]  Yes      Resolved Hospital Problems   No resolved problems to display.       Future Appointments   Date Time Provider Department Center   7/22/2019 11:00 AM VASCULAR, LAB [facility] VASCLAB [facility]   7/22/2019  2:00 PM Torrey Villafana MD [facility] VASCSUR [facility]   7/30/2019 11:00 AM [facility] OIC-CT1 500 LB LIMIT [facility] CTS [facility]   7/30/2019  1:00 PM Maxwell Issa MD [facility] NEUROS7 [facility]   8/6/2019  3:20 PM Mari Benitez MD [facility] HEPAT [facility]   9/20/2019 10:00 AM [facility] EMG PROCEDURAL LAB [facility] NEURO [facility]   9/20/2019 12:00 PM EKG, APPT [facility] EKG [facility]   9/20/2019  1:00 PM COORDINATED DEVICE CHECK [facility] ARRHPRO [facility]   9/20/2019  2:00 PM IV[facility]   10/1/2019  8:00 AM HOME MONITOR DEVICE CHECK, [facility] [address]           I have seen and examined this patient  face to face today. My clinical findings that support the need for the home health skilled services and home bound status are the following:  Weakness/numbness causing balance and gait disturbance due to Weakness/Debility making it taxing to leave home.  Requiring assistive device to leave home due to unsteady gait caused by  Weakness/Debility.    Allergies:  Review of patient's allergies indicates:   Allergen Reactions    Bactrim [sulfamethoxazole-trimethoprim] Other (See Comments)     Causes renal failure    Nsaids (non-steroidal anti-inflammatory drug) Other (See Comments)     Renal failure       Diet: diabetic diet: 2000 calorie and renal diet    Activities: activity as tolerated    Nursing:   SN to complete comprehensive assessment including routine vital signs. Instruct on disease process and s/s of complications to report to MD. Review/verify medication list sent home with the patient at time of discharge  and instruct patient/caregiver as needed. Frequency may be adjusted depending on start of care date.    Notify MD if SBP > 160 or < 90; DBP > 90 or < 50; HR > 120 or < 50; Temp > 101; Other:         CONSULTS:    Physical Therapy to evaluate and treat. Evaluate for home safety and equipment needs; Establish/upgrade home exercise program. Perform / instruct on therapeutic exercises, gait training, transfer training, and Range of Motion.  Occupational Therapy to evaluate and treat. Evaluate home environment for safety and equipment needs. Perform/Instruct on transfers, ADL training, ROM, and therapeutic exercises.   to evaluate for community resources/long-range planning.  Aide to provide assistance with personal care, ADLs, and vital signs.    MISCELLANEOUS CARE:  Routine Skin for Bedridden Patients: Instruct patient/caregiver to apply moisture barrier cream to all skin folds and wet areas in perineal area daily and after baths and all bowel movements.  Diabetic Care:   SN to perform and  educate Diabetic management with blood glucose monitoring:, Fingerstick blood sugar AC and HS and Report CBG < 60 or > 350 to physician.  Home Oxygen:  Oxygen at 3 L/min nasal canula to be used:  Continuously. and Assess oxygen saturation via pulse oximeter as needed for increase in SOB.    WOUND CARE ORDERS  yes:  Foot Ulcer:  Location: left hallux    Home health to do dressing changes every week by applying medihoney and protective football as follows: apply medihoney to wounds, dress with 4x4's, cast padding, and coban    Medications: Review discharge medications with patient and family and provide education.      Current Discharge Medication List      START taking these medications    Details   lisinopril (PRINIVIL,ZESTRIL) 2.5 MG tablet Take 1 tablet (2.5 mg total) by mouth once daily. Hold for systolic BP <100  Qty: 90 tablet, Refills: 3      senna-docusate 8.6-50 mg (PERICOLACE) 8.6-50 mg per tablet Take 1 tablet by mouth 2 (two) times daily.         CONTINUE these medications which have CHANGED    Details   fludrocortisone (FLORINEF) 0.1 mg Tab Take 2 tablets (200 mcg total) by mouth 2 (two) times daily.  Qty: 60 tablet, Refills: 5         CONTINUE these medications which have NOT CHANGED    Details   blood sugar diagnostic Strp 1 each by Misc.(Non-Drug; Combo Route) route once daily.  Qty: 100 each, Refills: 3    Comments: Please provide test strips covered by patient's insurance  Associated Diagnoses: Uncontrolled type 2 diabetes mellitus with hyperglycemia      cinacalcet (SENSIPAR) 30 MG Tab Take 30 mg by mouth daily with breakfast.      clopidogrel (PLAVIX) 75 mg tablet TAKE 1 TABLET(75 MG) BY MOUTH EVERY DAY  Qty: 90 tablet, Refills: 0    Comments: **Patient requests 90 days supply**      lancets Misc 1 each by Misc.(Non-Drug; Combo Route) route once daily.  Qty: 100 each, Refills: 3    Comments: Please provide lancets covered by patient's insurance  Associated Diagnoses: Uncontrolled type 2 diabetes  mellitus with hyperglycemia      levETIRAcetam (KEPPRA) 500 MG Tab TAKE 1 TABLET BY MOUTH TWICE DAILY  Qty: 180 tablet, Refills: 3    Associated Diagnoses: Seizure disorder      miconazole NITRATE 2 % (MICOTIN) 2 % top powder Apply topically 2 (two) times daily.  Refills: 0      midodrine (PROAMATINE) 10 MG tablet Take 1 tablet (10 mg total) by mouth 3 (three) times daily.  Qty: 90 tablet, Refills: 11      multivitamin (ONE DAILY MULTIVITAMIN) per tablet Take 1 tablet by mouth once daily.      nortriptyline (PAMELOR) 25 MG capsule Take 1 capsule by mouth nightly  Refills: 1      patiromer calcium sorbitex (VELTASSA) 16.8 gram PwPk Hold until follow-up with PCP  Qty: 1 packet, Refills: 0      prednisoLONE acetate (PRED FORTE) 1 % DrpS INSTILL ONE DROP INTO  LEFT EYE THREE TIMES A DAY  Refills: 3      predniSONE (DELTASONE) 20 MG tablet Take 1 tablet (20 mg total) by mouth once daily.  Qty: 30 tablet, Refills: 3      PROLENSA 0.07 % Drop INSTILL 1 DROP IN LEFT / RIGHT EYE QD  Refills: 12      DENISE-LINDA 0.8 mg Tab TAKE 1 TABLET BY MOUTH ONCE DAILY  Refills: 0      sevelamer carbonate (RENVELA) 800 mg Tab Take 1 tablet (800 mg total) by mouth daily with dinner or evening meal.  Qty: 30 tablet, Refills: 3      tiZANidine (ZANAFLEX) 4 MG tablet Take 4 mg by mouth nightly as needed.      TRADJENTA 5 mg Tab tablet TAKE 1 TABLET(5 MG) BY MOUTH EVERY DAY  Qty: 90 tablet, Refills: 1    Associated Diagnoses: Controlled type 2 diabetes mellitus with complication, without long-term current use of insulin      warfarin (COUMADIN) 5 MG tablet Take 1 tablet (5mg)  by mouth every Tues, Thurs, Sat and 1/2 tablet (2.5mg) every Mon, Wed, Fri. And Sun.  Qty: 30 tablet, Refills: 3      atorvastatin (LIPITOR) 80 MG tablet TAKE 1 TABLET BY MOUTH EVERY EVENING  Qty: 90 tablet, Refills: 1    Associated Diagnoses: Chronic combined systolic and diastolic heart failure      LORazepam (ATIVAN) 1 MG tablet Take 0.5 tablets (0.5 mg total) by mouth  every 8 (eight) hours as needed for Anxiety.  Qty: 14 tablet, Refills: 0    Associated Diagnoses: Anxiety      omeprazole (PRILOSEC) 20 MG capsule TAKE 1 CAPSULE BY MOUTH EVERY DAY  Qty: 90 capsule, Refills: 4    Comments: **Patient requests 90 days supply**  Associated Diagnoses: Gastroesophageal reflux disease with esophagitis         STOP taking these medications       aspirin (ECOTRIN) 81 MG EC tablet Comments:   Reason for Stopping:               I certify that this patient is confined to her home and needs intermittent skilled nursing care, physical therapy and occupational therapy.

## 2019-07-17 NOTE — PROGRESS NOTES
[facility] Hospital Medicine  Progress Note    Patient Name: Sisi Medel  MRN: [id number]  Patient Class: IP- Inpatient   Admission Date: 7/3/2019  Length of Stay: 14 days  Attending Physician: Gunner Khoury, *  Primary Care Provider: Carlos A Caputo MD    [facility] Team: [facility] MED R Gunner Khoury MD    Subjective:     Principal Problem:SDH (subdural hematoma)      HPI:  Per transferring physician:   The patient is a 58 year old female with PMHx of pulmonary sarcoidosis on 3-5L home oxygen(on prednisone), Class 3 HFrEF (3/2019 EF 35%), perm Afib/flutter s/p 6/2017 AVN ablation & PPM, ESRD (HD TTS-last dialysis yesterday), Type 2 DM (5/30/19 A1c 6.5%), pulmonary HTN, YOANDY, orthostatic hypotension, seizures (on keppra 500 mg Bid),  wheel chair bound admitted to [facility] s/p fall from wheel chair with right SDH. Patient's sister at bedside stated that patient's  fiance was was pushing her down a wheelchair ramp when they lost control and the patient fell off the side of the ramp out of her wheelchair into the grass. Pt reports brief LOC and complaining of  headache. Patient was at home on warfarin (for Afib/flutter) and Plavix. CT head reveals development of a thin hyperdense extra-axial collection overlying the right tentorium cerebella most compatible with acute subdural hemorrhage.  No significant mass effect or midline shift. Pending repeat CTH in 6 hours.  Kcentra, DDAVP and vitamin K initiated. NSGY following. Patient admitted to [facility] for close monitoring and higher level of care.      Of note patient has had frequent hospitalizations in the recent past. These admissions are typically due to volume overload, CHF exacerbation, and shortness of breath. She follows with pulmonology as an outpatient and is on chronic steroids. Per last outpatient note from Dr. Altamirano on 5/23/19, continued on prednisone 20mg daily for sarcoid as she has had difficulty and increased  hospitalizations on lower doses.    Overview/Hospital Course:  Trauma survey done showed no acute pelvic, knee or femur fractures. She had bilateral proximal 4th toe fractures which is being managed by Podiatry. She underwent a repeat CTH which was stable. Now with supratherapeutic INR, will hold coumadin today. Otherwise, doing well. Awaiting SNF placement    Interval History: Patient seen and examined at bedside. States she feels well today; headache resolved. States she still feels very weak and would very much like to go to SNF rather than home with home health.    Review of Systems   Constitutional: Positive for fatigue. Negative for activity change and appetite change.   HENT: Negative.    Eyes: Negative.    Respiratory: Negative for shortness of breath (chronic).         On home oxygen / O2 via nasal cannula   Cardiovascular: Negative for chest pain.   Gastrointestinal: Negative for abdominal pain, constipation, diarrhea, nausea and vomiting.   Endocrine: Negative.    Genitourinary: Negative.    Musculoskeletal:        R forearm pain and numbness   Skin: Positive for color change (hematoma).   Allergic/Immunologic: Negative.    Neurological: Positive for weakness.   Psychiatric/Behavioral: The patient is nervous/anxious.      Objective:     Vital Signs (Most Recent):  Temp: 97.1 °F (36.2 °C) (07/17/19 1144)  Pulse: 70 (07/17/19 1144)  Resp: 18 (07/17/19 0745)  BP: 132/67 (07/17/19 1144)  SpO2: 100 % (07/17/19 1144) Vital Signs (24h Range):  Temp:  [96.3 °F (35.7 °C)-97.8 °F (36.6 °C)] 97.1 °F (36.2 °C)  Pulse:  [62-79] 70  Resp:  [17-20] 18  SpO2:  [92 %-100 %] 100 %  BP: ()/(52-72) 132/67     Weight: 112 kg (246 lb 14.6 oz)  Body mass index is 36.46 kg/m².    Intake/Output Summary (Last 24 hours) at 7/17/2019 1548  Last data filed at 7/17/2019 0400  Gross per 24 hour   Intake 0 ml   Output --   Net 0 ml      Physical Exam   Constitutional: She is oriented to person, place, and time. She appears  well-developed and well-nourished.   obese   HENT:   Head: Normocephalic.   Eyes: Pupils are equal, round, and reactive to light. Conjunctivae and EOM are normal. Right eye exhibits no discharge. Left eye exhibits no discharge. No scleral icterus.   Cardiovascular: Normal rate and regular rhythm.   Pulmonary/Chest: Effort normal. No tachypnea. She has rhonchi.   On oxygen via nasal cannula   Abdominal: Soft. She exhibits no distension.   Musculoskeletal: Normal range of motion. She exhibits no edema.   Neurological: She is alert and oriented to person, place, and time. She displays normal reflexes. No cranial nerve deficit or sensory deficit. She exhibits normal muscle tone. Coordination normal.   Psychiatric: Judgment normal. Her affect is labile. She exhibits a depressed mood.     Significant Labs:   CBC:   Recent Labs   Lab 07/16/19 0414 07/17/19 0457   WBC 13.58* 9.19   HGB 8.3* 7.9*   HCT 29.8* 28.0*    178     CMP:   Recent Labs   Lab 07/16/19 0414 07/17/19 0457   * 133*   K 5.7* 4.1   CL 91* 92*   CO2 15* 28   GLU 87 127*   BUN 86* 47*   CREATININE 6.8* 4.6*   CALCIUM 9.1 8.8   PROT 7.2 6.7   ALBUMIN 2.9* 2.6*   BILITOT 1.0 1.0   ALKPHOS 283* 238*   AST 77* 57*   * 151*   ANIONGAP 22* 13   EGFRNONAA 6.2* 9.9*       Significant Imaging: I have reviewed and interpreted all pertinent imaging results/findings within the past 24 hours.      Assessment/Plan:      * SDH (subdural hematoma)  --  Neurosurgery consulted-no surg intevention  -- CT Head revealed--Interval development of a thin hyperdense extra-axial collection overlying the right tentorium cerebella most compatible with acute subdural hemorrhage.  No significant mass effect or midline shift. Rpt CT 7/8 stable.  --  Managed medically with neuro checks and observation, BP mgmt. She was given DDAVP and Vit K.  --  Pt was on Coumadin and Plavix at home, INR on admit 3.4. AC initially held, subsequently restarted (Warfarin 5mg qT/Th/Sa  and 2.5mg all other days) ordered on 7/8. She became therapeutic 7/14 and CTH was repeated (and stable).   -- resume plavix    Obese  Nutrition recs  Diet modification and counseling    Fall at home, initial encounter  --s/p fall at home from wheelchair  --XR alia pelvis-No acute displaced fracture-dislocation identified.  --XR L femur-Visualized osseous structures appear intact, with no definite evidence of recent fracture or other significant abnormality identified.  -XR L knee-No evidence of recent fracture or other significant detrimental interval change since the examinations referenced above.  Old healed left proximal fibular shaft fracture is noted.   --b/l 4th toe fractures, managing with darco shoe, per podiatry.  --fall precautions  --PT/OT, OOB. Recommended SNF.  --SW/CM following; pending accepting facility.    Transaminitis  --present on admission  --trending up, and new.  --liver U/S unremarkable, hep panel -ve, HIV -ve  --Hepatology consulted; running AI markers KAYLAH, IgG, ASMA, AMA (+ve). However, suspect this is likely due to congestive hepatopathy given volume overload, elevated BNP, rEF on 2D Echo and high R heart pressures. Cardiology service consulted for mgmt recommendations. However, current plan is to continue HD as is; she would be difficult to implement GDMT due to baseline hypotension. F/u with cardiology re: recs.    Anemia in ESRD (end-stage renal disease)  -H&H stable on admit.  Oozing, bleeding noted from RUE wound. Elevated INR noted. Nursing instructed to place pressure bandage. No other overt bleeding.   - 1u pRBC ordered 7/7, but was unable to complete due to possible infiltration/pain in IV and inability to establish other access.   - Consulted PICC team.  -Additional pRBC transfused 7/11 for Hg<7    Paroxysmal A-fib  --pt at home on warfarin- held on admission due to ICH.  --restarted on 7/8 but will hold again today due to supratherapeutic INR  --Daily INR    Ulcer of lower  limb  -- various stages of bilateral lower legs wounds  -- resume plavix, will hold off on ASA  -- Podiatry consulted-appreciate recs; will need f/u as outpatient    Orthostatic hypotension  -continue home med Midodrine and fludricortisone  -map goal >60    ESRD on hemodialysis  --ESRD on HD, fistula left upper arm  --nephrology consulted- appreciate recs    Biventricular cardiac pacemaker in situ  - in place    Weakness generalized  PT/OT  Fall precautions    Acute on chronic systolic congestive heart failure  - volume control with HD  - ideally would have on GDMT; however, ability to titrate medications is limited 2/2 hypotension  - will add on low dose lisinopril 2.5mg daily and initiate slow titration per Cards recs      Hyperlipidemia  --cont home med atorvastatin    Epilepsy  --Hx of seizures for which she is on keppra at home  --continue Keppra 500 mg BID  --Seizure precautions      VTE Risk Mitigation (From admission, onward)        Ordered     warfarin (COUMADIN) tablet 2.5 mg  Every Sunday 07/16/19 0838     warfarin (COUMADIN) tablet 5 mg  Every Tues, Thurs, Sat      07/16/19 0838     warfarin (COUMADIN) tablet 2.5 mg  Every Mon, Wed, Fri 07/16/19 0838                Gunner Khoury MD  Department of Hospital Medicine   [facility]

## 2019-07-17 NOTE — PT/OT/SLP PROGRESS
"Physical Therapy Treatment    Patient Name:  Sisi Medel   MRN:  [id number]    Recommendations:     Discharge Recommendations:  nursing facility, skilled   Discharge Equipment Recommendations: (bariatric bedside commode and bath bench)   Barriers to discharge: Decreased caregiver support    Assessment:     Sisi Medel is a 58 y.o. female admitted with a medical diagnosis of SDH (subdural hematoma).  She presents with the following impairments/functional limitations:  weakness, impaired endurance, impaired self care skills, impaired functional mobilty, gait instability, impaired balance, decreased upper extremity function, decreased lower extremity function, decreased safety awareness, impaired coordination, edema, impaired skin, impaired cardiopulmonary response to activity. Pt tolerated session well with focus on transfers and gait training. Pt limited this session by anxiousness and poor active participation in standing attempts with pt citing fear of falling. Pts ambulation limited by need for toileting upon initiatin gait training in room. Pt will continue to benefit from therapy services to address impairments listed above.     Rehab Prognosis: Fair; patient would benefit from acute skilled PT services to address these deficits and reach maximum level of function.    Recent Surgery: * No surgery found *      Plan:     During this hospitalization, patient to be seen 4 x/week to address the identified rehab impairments via gait training, therapeutic activities, therapeutic exercises and progress toward the following goals:    · Plan of Care Expires:  08/03/19    Subjective     Chief Complaint: no c/o  Patient/Family Comments/goals: "I just don't know Bernabe, I just can't do this."  Pain/Comfort:  · Pain Rating 1: 0/10  · Pain Rating Post-Intervention 1: 0/10      Objective:     Communicated with NSG prior to session.  Patient found seated at EOB with telemetry, oxygen upon PTA entry to room. "     General Precautions: Standard, fall   Orthopedic Precautions:RLE weight bearing as tolerated   Braces: (B Darco shoes)     Functional Mobility:  · Transfers:     · Sit to Stand:  maximal assistance with rolling walker  · Toilet Transfer: minimum assistance and moderate assistance with  rolling walker  using  Stand Pivot  · Gait: Pt ambulates with Min/Mod A and RW support between EOB and bedside commode ~6 ft away. Pt demonstrating poor balance and poor effort with standing and ambulation this day. Pt cites fear as limiting factor.       AM-PAC 6 CLICK MOBILITY  Turning over in bed (including adjusting bedclothes, sheets and blankets)?: 4  Sitting down on and standing up from a chair with arms (e.g., wheelchair, bedside commode, etc.): 3  Moving from lying on back to sitting on the side of the bed?: 4  Moving to and from a bed to a chair (including a wheelchair)?: 2  Need to walk in hospital room?: 3  Climbing 3-5 steps with a railing?: 2  Basic Mobility Total Score: 18       Therapeutic Activities and Exercises:  Pt assisted with functional mobility as noted above.   Pt assisted to bedside commode and is set-up independent with pericare.  Pt requires increased time for rest and for encouragement to participate d/t anxiety with OOB mobility this day.   Pt requiring several attempts to complete 2 successful standing trials d/t poor effort with standing.         Patient left seated at EOB with all lines intact, call button in reach and NSG notified.    GOALS:   Multidisciplinary Problems     Physical Therapy Goals        Problem: Physical Therapy Goal    Goal Priority Disciplines Outcome Goal Variances Interventions   Physical Therapy Goal     PT, PT/OT Ongoing (interventions implemented as appropriate)     Description:  Goals to be met by: 2019     Patient will increase functional independence with mobility by performin. Supine to sit with Modified [place]. Met (2019)  2. Sit to supine with  supervision  3. Sit to stand transfer with Contact Guard Assistance with RW and maintaining any weight bearing precautions- Met 7/5  Sit to stand transfer with supervision assistance with RW from bedside chair.  4. Bed to chair transfer with Contact Guard Assistance using Rolling Walker and maintaining any weight bearing precautions. Met (7/9/2019)  4B. Bed to w/c transfer using sliding board and maintaining any weight bearing precautions w/ min assist  5. Gait  x 30 feet with Contact Guard Assistance using Rolling Walker. and maintaining any weight bearing precautions  6. Lower extremity exercise program x20 reps per handout, with assistance as needed                         Time Tracking:     PT Received On: 07/17/19  PT Start Time: 0855     PT Stop Time: 0933  PT Total Time (min): 38 min     Billable Minutes: Gait Training 10 and Therapeutic Activity 28    Treatment Type: Treatment  PT/PTA: PTA     PTA Visit Number: 3     Bernabe Branch, PTA  07/17/2019

## 2019-07-17 NOTE — PHYSICIAN QUERY
PT Name: Sisi Medel  MR #: [id number]     Physician Query Form - Documentation Clarification      CDS/: Daphne Vargas RN, CDS              Contact information: [email or web address]    This form is a permanent document in the medical record.     Query Date: July 17, 2019    By submitting this query, we are merely seeking further clarification of documentation. Please utilize your independent clinical judgment when addressing the question(s) below.    The Medical record reflects the following:    Supporting Clinical Findings Location in Medical Record     --Of note patient has had frequent hospitalizations in the recent past. These admissions are typically due to volume overload, CHF exacerbation, and shortness of breath.  -- Transaminitis         -trending down         -follow CMP daily  --Past medical history List:          -Chronic combined systolic and diastolic heart failure     --hepatology consulted for rising LFTs since admission        -Likely etiology of her abnormal  LFT is due to right heart failure and congestive hepatopathy.          -Examination consistent with fluid overload.         -- recommend cardiology consultation ?right heart failure    --CHF (congestive heart failure)             - Some trace edema on LE +1, no JVD or HJ reflex on my exam            - I ordered BNP, CXR (similar to last one mild fluid mostly interstitital changes from sarcoid) O2 requirement same           -There may be a mild component of HF but she does not seem to be in flourid heart failure             -- Continue volume removal with dilaysis     --Acute on chronic systolic congestive heart failure            - volume control with HD             - ideally would have on GDMT; however, ability to titrate medications is limited 2/2 hypotension               - will add on low dose lisinopril 2.5mg daily and initiate slow titration per Cards recs      H&P 7/3                  Hepatology c/s  7/10          Cards C/S 7/14                  Hosp Med Note 7/16     BNP: 3209    --Stable cardiomegaly.  Increased central perihilar and parenchymal opacity, likely in keeping with residual/recurrent edema.         -Comparison to Chest AP portable dated 07/01/2019    · --Class 3 HFrEF (3/2019 EF 35%)    --Severe decreased ejection fraction at 25%  --Severely decreased left ventricular systolic function  --Severe right ventricular enlargement. Moderately reduced right ventricular systolic function.    --No IV diuretic administration    --ESRD on hemodialysis           -HD tx days: TThS          -HD tx time: 4hrs 45mins     --- No RRT needed at this time; last HD treatment prior to admission 7/2/2019     --- Short course of dialysis with minimal to no fluid removal 7/4    Labs 7/14    CXR 7/14        H&P    TTE 7/14          MAR    Nephrology c/s 7/3          Neuro Note 7/4                                                                           Doctor, Please specify diagnosis or diagnoses associated with above clinical findings.      Please clarify the CHF type and POA status:    Provider Use Only      [  ] Acute on Chronic combined systolic and diastolic heart failure               [  ] POA     [  ] not POA    [ x ] Acute on chronic systolic congestive heart failure              [x  ]  POA     [  ] not POA    [  ] Other (please specify):________________                                                                                                               [  ] Clinically Undetermined

## 2019-07-17 NOTE — PLAN OF CARE
Problem: Fall Injury Risk  Goal: Absence of Fall and Fall-Related Injury  Outcome: Ongoing (interventions implemented as appropriate)  No falls this shift, wctm    Problem: Adult Inpatient Plan of Care  Goal: Plan of Care Review  Outcome: Ongoing (interventions implemented as appropriate)  POC reviewed with pt, she verbalized understanding    Problem: Skin Injury Risk Increased  Goal: Skin Health and Integrity  Outcome: Ongoing (interventions implemented as appropriate)  encouraged turning, pt agreeable to reposition independently

## 2019-07-17 NOTE — PLAN OF CARE
Problem: Adult Inpatient Plan of Care  Goal: Plan of Care Review  Outcome: Ongoing (interventions implemented as appropriate)  Patient is AAOx4. Vital signs stable. No falls throughout shift. Patient repositions self independently. No complaints of pain. Blood glucose monitored and insulin administered.

## 2019-07-17 NOTE — ASSESSMENT & PLAN NOTE
--  Neurosurgery consulted-no surg intevention  -- CT Head revealed--Interval development of a thin hyperdense extra-axial collection overlying the right tentorium cerebella most compatible with acute subdural hemorrhage.  No significant mass effect or midline shift. Rpt CT 7/8 stable.  --  Managed medically with neuro checks and observation, BP mgmt. She was given DDAVP and Vit K.  --  Pt was on Coumadin and Plavix at home, INR on admit 3.4. AC initially held, subsequently restarted (Warfarin 5mg qT/Th/Sa and 2.5mg all other days) ordered on 7/8. She became therapeutic 7/14 and CTH was repeated (and stable).   -- resume plavix

## 2019-07-17 NOTE — PLAN OF CARE
SANJU spoke to patients family in regards to SNF acceptance. [facility] has denied to low functioning. [facility] is interested but are getting a flag based on a pass progressive insurance car claim. SANJU gave pts son Kenan the number to [facility] to see if there is any thing additional he can do to facilitate a transfer there. Kenan returned a call to  after speaking to [facility], they informed him to gather the information that states that the case is closed and fax it to them. Kenan will fax the information to [facility] and [facility]/SANJU will be able to proceed with placement. Md notified.

## 2019-07-17 NOTE — ASSESSMENT & PLAN NOTE
-- various stages of bilateral lower legs wounds  -- resume plavix, will hold off on ASA  -- Podiatry consulted-appreciate recs; will need f/u as outpatient

## 2019-07-17 NOTE — PLAN OF CARE
Problem: Physical Therapy Goal  Goal: Physical Therapy Goal  Goals to be met by: 2019     Patient will increase functional independence with mobility by performin. Supine to sit with Modified Thompson. Met (2019)  2. Sit to supine with supervision  3. Sit to stand transfer with Contact Guard Assistance with RW and maintaining any weight bearing precautions- Met   Sit to stand transfer with supervision assistance with RW from bedside chair.  4. Bed to chair transfer with Contact Guard Assistance using Rolling Walker and maintaining any weight bearing precautions. Met (2019)  4B. Bed to w/c transfer using sliding board and maintaining any weight bearing precautions w/ min assist  5. Gait  x 30 feet with Contact Guard Assistance using Rolling Walker. and maintaining any weight bearing precautions  6. Lower extremity exercise program x20 reps per handout, with assistance as needed        Outcome: Ongoing (interventions implemented as appropriate)  Goals remain appropriate.

## 2019-07-17 NOTE — PROGRESS NOTES
Notified Dr. Khoury of patient's blood pressure of 92/54. Dr. Khoury ordered to hold lisinopril at this time. Will continue to monitor.

## 2019-07-17 NOTE — SUBJECTIVE & OBJECTIVE
Interval History: Patient seen and examined at bedside. States she feels well today; headache resolved. States she still feels very weak and would very much like to go to SNF rather than home with home health.    Review of Systems   Constitutional: Positive for fatigue. Negative for activity change and appetite change.   HENT: Negative.    Eyes: Negative.    Respiratory: Negative for shortness of breath (chronic).         On home oxygen / O2 via nasal cannula   Cardiovascular: Negative for chest pain.   Gastrointestinal: Negative for abdominal pain, constipation, diarrhea, nausea and vomiting.   Endocrine: Negative.    Genitourinary: Negative.    Musculoskeletal:        R forearm pain and numbness   Skin: Positive for color change (hematoma).   Allergic/Immunologic: Negative.    Neurological: Positive for weakness.   Psychiatric/Behavioral: The patient is nervous/anxious.      Objective:     Vital Signs (Most Recent):  Temp: 97.1 °F (36.2 °C) (07/17/19 1144)  Pulse: 70 (07/17/19 1144)  Resp: 18 (07/17/19 0745)  BP: 132/67 (07/17/19 1144)  SpO2: 100 % (07/17/19 1144) Vital Signs (24h Range):  Temp:  [96.3 °F (35.7 °C)-97.8 °F (36.6 °C)] 97.1 °F (36.2 °C)  Pulse:  [62-79] 70  Resp:  [17-20] 18  SpO2:  [92 %-100 %] 100 %  BP: ()/(52-72) 132/67     Weight: 112 kg (246 lb 14.6 oz)  Body mass index is 36.46 kg/m².    Intake/Output Summary (Last 24 hours) at 7/17/2019 1548  Last data filed at 7/17/2019 0400  Gross per 24 hour   Intake 0 ml   Output --   Net 0 ml      Physical Exam   Constitutional: She is oriented to person, place, and time. She appears well-developed and well-nourished.   obese   HENT:   Head: Normocephalic.   Eyes: Pupils are equal, round, and reactive to light. Conjunctivae and EOM are normal. Right eye exhibits no discharge. Left eye exhibits no discharge. No scleral icterus.   Cardiovascular: Normal rate and regular rhythm.   Pulmonary/Chest: Effort normal. No tachypnea. She has rhonchi.   On  oxygen via nasal cannula   Abdominal: Soft. She exhibits no distension.   Musculoskeletal: Normal range of motion. She exhibits no edema.   Neurological: She is alert and oriented to person, place, and time. She displays normal reflexes. No cranial nerve deficit or sensory deficit. She exhibits normal muscle tone. Coordination normal.   Psychiatric: Judgment normal. Her affect is labile. She exhibits a depressed mood.     Significant Labs:   CBC:   Recent Labs   Lab 07/16/19 0414 07/17/19 0457   WBC 13.58* 9.19   HGB 8.3* 7.9*   HCT 29.8* 28.0*    178     CMP:   Recent Labs   Lab 07/16/19 0414 07/17/19 0457   * 133*   K 5.7* 4.1   CL 91* 92*   CO2 15* 28   GLU 87 127*   BUN 86* 47*   CREATININE 6.8* 4.6*   CALCIUM 9.1 8.8   PROT 7.2 6.7   ALBUMIN 2.9* 2.6*   BILITOT 1.0 1.0   ALKPHOS 283* 238*   AST 77* 57*   * 151*   ANIONGAP 22* 13   EGFRNONAA 6.2* 9.9*       Significant Imaging: I have reviewed and interpreted all pertinent imaging results/findings within the past 24 hours.

## 2019-07-18 NOTE — PT/OT/SLP PROGRESS
"Speech Language Pathology Treatment  And Discharge Summary    Patient Name:  Sisi Medel   MRN:  [id number]  Admitting Diagnosis: SDH (subdural hematoma)    Recommendations:                 General Recommendations:  Follow-up not indicated  Diet recommendations:  Regular, Liquid Diet Level: Thin   Aspiration Precautions: Standard aspiration precautions   General Precautions: Standard, fall  Communication strategies:  none    Subjective     "Why are you asking all of these questions"    Pain/Comfort:  · Pain Rating 1: 0/10    Objective:     Has the patient been evaluated by SLP for swallowing?   Yes  Keep patient NPO? No   Current Respiratory Status: nasal cannula      Upon ST entry to room, patient on the phone with fiance. Patient noted to have good conversational skills, good expression of ideas/wants/thoughts, good topic maintenance - overall no cognitive-linguistic deficits during spontaneous conversation. Patient recalled events of day with 100% acc. Overall, patient does not appear to require further speech therapy services within the acute setting. ST educated patient on continued standard aspiration precautions as well as outpatient speech therapy/speech therapy at next level of care should deficits become noted. Patient verbalized understanding.     Assessment:     Sisi Medel is a 58 y.o. female with an SLP diagnosis of apparrent baseline cognitive-linguistic status.      Goals:   Multidisciplinary Problems     SLP Goals        Problem: SLP Goal    Goal Priority Disciplines Outcome   SLP Goal     SLP Revised   Description:  Goals expected to be met by 7/11:   1. Pt will name >10 items in concrete category within one minute IND. -MET 7/5  2. Pt will complete mental manipulation of 4 word sets with 90% acc given min cues.   3. Pt will complete functional math word problems (time/money) with 80% acc given min cues. Goal met  4. Pt will utilize memory strategies to recall 3 units of novel " information after 3-5 minute delay with min cues.  Goal met  5. Pt will participate in further assessment of reading, writing, and cognition.       Updated goals expected to be met 7/18:   1. Pt will complete mental manipulation of 4 word sets with 90% acc given min cues.   2. Pt will complete functional math word problems (time/money) with 80% acc.  3. Pt will utilize memory strategies to recall 3 units of novel information after 5 minute delay with modified independence.   4. Pt will participate in further assessment of reading, writing, and cognition.                         Plan:     · Patient to be seen:  2 x/week   · Plan of Care expires:  08/02/19  · Plan of Care reviewed with:  patient   · SLP Follow-Up:  Yes       Discharge recommendations:  nursing facility, skilled   Barriers to Discharge:  None    Time Tracking:     SLP Treatment Date:   07/18/19  Speech Start Time:  1255  Speech Stop Time:  1305     Speech Total Time (min):  10 min    Billable Minutes: Speech Therapy Individual 10    Emily Abadie, CCC-SLP  07/18/2019

## 2019-07-18 NOTE — ASSESSMENT & PLAN NOTE
- volume control with HD  - ideally would have on GDMT; however, ability to titrate medications is limited 2/2 hypotension  - added on low dose lisinopril 2.5mg daily with hold precautions

## 2019-07-18 NOTE — ASSESSMENT & PLAN NOTE
--s/p fall at home from wheelchair  --XR alia pelvis-No acute displaced fracture-dislocation identified.  --XR L femur-Visualized osseous structures appear intact, with no definite evidence of recent fracture or other significant abnormality identified.  -XR L knee-No evidence of recent fracture or other significant detrimental interval change since the examinations referenced above.  Old healed left proximal fibular shaft fracture is noted.   --b/l 4th toe fractures, managing with darco shoe, per podiatry.  --fall precautions  --PT/OT, OOB. Recommended SNF.  --SW/CM following; pending insurance approval

## 2019-07-18 NOTE — PROGRESS NOTES
7/18/19 pt hospitalized for recent fall resulting in subdural hematoma, transaminitis, hx ESRD (on HD), hx Afib on chronic anticoagulation.  Pt calendar has been updated.  Will follow up w/ pt w/in a week. NOTE: no documentation for pt receiving dosing for July 6-7th.

## 2019-07-18 NOTE — SUBJECTIVE & OBJECTIVE
Interval History: Patient seen and examined at bedside. Doing well today. Per patient, son will bring paperwork for SNF tomorrow.    Review of Systems   Constitutional: Positive for fatigue. Negative for activity change and appetite change.   HENT: Negative.    Eyes: Negative.    Respiratory: Negative for shortness of breath (chronic).         On home oxygen / O2 via nasal cannula   Cardiovascular: Negative for chest pain.   Gastrointestinal: Negative for abdominal pain, constipation, diarrhea, nausea and vomiting.   Endocrine: Negative.    Genitourinary: Negative.    Musculoskeletal:        R forearm pain and numbness   Skin: Positive for color change (hematoma).   Allergic/Immunologic: Negative.    Neurological: Positive for weakness.   Psychiatric/Behavioral: The patient is nervous/anxious.      Objective:     Vital Signs (Most Recent):  Temp: 96.1 °F (35.6 °C) (07/18/19 1319)  Pulse: 70 (07/18/19 1319)  Resp: 14 (07/18/19 1319)  BP: 97/61 (07/18/19 1319)  SpO2: 96 % (07/18/19 1319) Vital Signs (24h Range):  Temp:  [96.1 °F (35.6 °C)-97.5 °F (36.4 °C)] 96.1 °F (35.6 °C)  Pulse:  [53-75] 70  Resp:  [14-20] 14  SpO2:  [92 %-99 %] 96 %  BP: ()/(49-88) 97/61     Weight: 112 kg (246 lb 14.6 oz)  Body mass index is 36.46 kg/m².  No intake or output data in the 24 hours ending 07/18/19 1342   Physical Exam   Constitutional: She is oriented to person, place, and time. She appears well-developed and well-nourished.   obese   HENT:   Head: Normocephalic.   Eyes: Pupils are equal, round, and reactive to light. Conjunctivae and EOM are normal. Right eye exhibits no discharge. Left eye exhibits no discharge. No scleral icterus.   Cardiovascular: Normal rate and regular rhythm.   Pulmonary/Chest: Effort normal. No tachypnea. She has rhonchi.   On oxygen via nasal cannula   Abdominal: Soft. She exhibits no distension.   Musculoskeletal: Normal range of motion. She exhibits no edema.   Neurological: She is alert and  oriented to person, place, and time. She displays normal reflexes. No cranial nerve deficit or sensory deficit. She exhibits normal muscle tone. Coordination normal.   Psychiatric: Judgment normal. Her affect is labile. She exhibits a depressed mood.     Significant Labs:   CBC:   Recent Labs   Lab 07/17/19 0457 07/18/19 0443   WBC 9.19 10.90   HGB 7.9* 8.3*   HCT 28.0* 28.8*    195     CMP:   Recent Labs   Lab 07/17/19 0457 07/18/19 0442   * 131*   K 4.1 4.6   CL 92* 90*   CO2 28 24   * 92   BUN 47* 70*   CREATININE 4.6* 5.9*   CALCIUM 8.8 9.1   PROT 6.7 6.8   ALBUMIN 2.6* 2.8*   BILITOT 1.0 1.0   ALKPHOS 238* 220*   AST 57* 48*   * 123*   ANIONGAP 13 17*   EGFRNONAA 9.9* 7.3*       Significant Imaging: I have reviewed and interpreted all pertinent imaging results/findings within the past 24 hours.

## 2019-07-18 NOTE — ASSESSMENT & PLAN NOTE
-- various stages of bilateral lower legs wounds  -- continue plavix, will hold off on ASA  -- Podiatry consulted-appreciate recs; will need f/u as outpatient

## 2019-07-18 NOTE — PT/OT/SLP PROGRESS
Speech Language Pathology  Patient not seen      Sisi Medel  MRN: [id number]    Patient not seen today secondary to patient BRAYAN for dialysis. Will follow-up later this service if SLP able vs next scheduled service date.     Emily P. Abadie M.S., CCC-SLP  Speech Language Pathologist  [phone]  07/18/2019

## 2019-07-18 NOTE — PLAN OF CARE
Problem: Diabetes Comorbidity  Goal: Blood Glucose Level Within Desired Range    Intervention: Maintain Glycemic Control  Blood glucose WDL.      Problem: Skin Injury Risk Increased  Goal: Skin Health and Integrity  Outcome: Ongoing (interventions implemented as appropriate)  Patient encouraged to reposition independently. Patient verbalized understanding. Pillows also used to for repositioning.

## 2019-07-18 NOTE — PROGRESS NOTES
HD treatment complete. Duration of treatment 3.5 hours and 1.8 L removed. Treatment was tolerated well and no complications with access to left upper arm. Needles removed and hemostasis achieved. Dressing intact and no drainage noted. Thrill and bruit present.

## 2019-07-18 NOTE — PT/OT/SLP PROGRESS
Occupational Therapy      Patient Name:  Sisi Medel   MRN:  [id number]    Patient seen not treated today secondary to Dialysis, Patient unwilling to participate. Will follow-up tomorrow.    García Mabry, OT  7/18/2019

## 2019-07-18 NOTE — PROGRESS NOTES
[facility] Hospital Medicine  Progress Note    Patient Name: Sisi Medel  MRN: [id number]  Patient Class: IP- Inpatient   Admission Date: 7/3/2019  Length of Stay: 15 days  Attending Physician: Gunner Khoury, *  Primary Care Provider: Carlos A Caputo MD    [facility] Medicine Team: [facility] HOSP MED R Gunner Khoury MD    Subjective:     Principal Problem:SDH (subdural hematoma)      HPI:  Per transferring physician:   The patient is a 58 year old female with PMHx of pulmonary sarcoidosis on 3-5L home oxygen(on prednisone), Class 3 HFrEF (3/2019 EF 35%), perm Afib/flutter s/p 6/2017 AVN ablation & PPM, ESRD (HD TTS-last dialysis yesterday), Type 2 DM (5/30/19 A1c 6.5%), pulmonary HTN, YOANDY, orthostatic hypotension, seizures (on keppra 500 mg Bid),  wheel chair bound admitted to [facility] s/p fall from wheel chair with right SDH. Patient's sister at bedside stated that patient's  fiance was was pushing her down a wheelchair ramp when they lost control and the patient fell off the side of the ramp out of her wheelchair into the grass. Pt reports brief LOC and complaining of  headache. Patient was at home on warfarin (for Afib/flutter) and Plavix. CT head reveals development of a thin hyperdense extra-axial collection overlying the right tentorium cerebella most compatible with acute subdural hemorrhage.  No significant mass effect or midline shift. Pending repeat CTH in 6 hours.  Kcentra, DDAVP and vitamin K initiated. NSGY following. Patient admitted to [facility] for close monitoring and higher level of care.      Of note patient has had frequent hospitalizations in the recent past. These admissions are typically due to volume overload, CHF exacerbation, and shortness of breath. She follows with pulmonology as an outpatient and is on chronic steroids. Per last outpatient note from Dr. Altamirano on 5/23/19, continued on prednisone 20mg daily for sarcoid as she has had difficulty and increased  hospitalizations on lower doses.    Overview/Hospital Course:  Trauma survey done showed no acute pelvic, knee or femur fractures. She had bilateral proximal 4th toe fractures which is being managed by Podiatry. She underwent a repeat CTH which was stable. INR therapeutic.  Otherwise, doing well. Awaiting SNF placement    Interval History: Patient seen and examined at bedside. Doing well today. Per patient, son will bring paperwork for SNF tomorrow.    Review of Systems   Constitutional: Positive for fatigue. Negative for activity change and appetite change.   HENT: Negative.    Eyes: Negative.    Respiratory: Negative for shortness of breath (chronic).         On home oxygen / O2 via nasal cannula   Cardiovascular: Negative for chest pain.   Gastrointestinal: Negative for abdominal pain, constipation, diarrhea, nausea and vomiting.   Endocrine: Negative.    Genitourinary: Negative.    Musculoskeletal:        R forearm pain and numbness   Skin: Positive for color change (hematoma).   Allergic/Immunologic: Negative.    Neurological: Positive for weakness.   Psychiatric/Behavioral: The patient is nervous/anxious.      Objective:     Vital Signs (Most Recent):  Temp: 96.1 °F (35.6 °C) (07/18/19 1319)  Pulse: 70 (07/18/19 1319)  Resp: 14 (07/18/19 1319)  BP: 97/61 (07/18/19 1319)  SpO2: 96 % (07/18/19 1319) Vital Signs (24h Range):  Temp:  [96.1 °F (35.6 °C)-97.5 °F (36.4 °C)] 96.1 °F (35.6 °C)  Pulse:  [53-75] 70  Resp:  [14-20] 14  SpO2:  [92 %-99 %] 96 %  BP: ()/(49-88) 97/61     Weight: 112 kg (246 lb 14.6 oz)  Body mass index is 36.46 kg/m².  No intake or output data in the 24 hours ending 07/18/19 1342   Physical Exam   Constitutional: She is oriented to person, place, and time. She appears well-developed and well-nourished.   obese   HENT:   Head: Normocephalic.   Eyes: Pupils are equal, round, and reactive to light. Conjunctivae and EOM are normal. Right eye exhibits no discharge. Left eye exhibits no  discharge. No scleral icterus.   Cardiovascular: Normal rate and regular rhythm.   Pulmonary/Chest: Effort normal. No tachypnea. She has rhonchi.   On oxygen via nasal cannula   Abdominal: Soft. She exhibits no distension.   Musculoskeletal: Normal range of motion. She exhibits no edema.   Neurological: She is alert and oriented to person, place, and time. She displays normal reflexes. No cranial nerve deficit or sensory deficit. She exhibits normal muscle tone. Coordination normal.   Psychiatric: Judgment normal. Her affect is labile. She exhibits a depressed mood.     Significant Labs:   CBC:   Recent Labs   Lab 07/17/19 0457 07/18/19  0443   WBC 9.19 10.90   HGB 7.9* 8.3*   HCT 28.0* 28.8*    195     CMP:   Recent Labs   Lab 07/17/19 0457 07/18/19 0442   * 131*   K 4.1 4.6   CL 92* 90*   CO2 28 24   * 92   BUN 47* 70*   CREATININE 4.6* 5.9*   CALCIUM 8.8 9.1   PROT 6.7 6.8   ALBUMIN 2.6* 2.8*   BILITOT 1.0 1.0   ALKPHOS 238* 220*   AST 57* 48*   * 123*   ANIONGAP 13 17*   EGFRNONAA 9.9* 7.3*       Significant Imaging: I have reviewed and interpreted all pertinent imaging results/findings within the past 24 hours.      Assessment/Plan:      * SDH (subdural hematoma)  --  Neurosurgery consulted-no surg intevention  -- CT Head revealed--Interval development of a thin hyperdense extra-axial collection overlying the right tentorium cerebella most compatible with acute subdural hemorrhage.  No significant mass effect or midline shift. Rpt CT 7/8 stable.  --  Managed medically with neuro checks and observation, BP mgmt. She was given DDAVP and Vit K.  --  Pt was on Coumadin and Plavix at home, INR on admit 3.4. AC initially held, subsequently restarted (Warfarin 5mg qT/Th/Sa and 2.5mg all other days) ordered on 7/8. She became therapeutic 7/14 and CTH was repeated (and stable).   -- resume plavix    Obese  Nutrition recs  Diet modification and counseling    Fall at home, initial  "encounter  --s/p fall at home from wheelchair  --XR alia pelvis-No acute displaced fracture-dislocation identified.  --XR L femur-Visualized osseous structures appear intact, with no definite evidence of recent fracture or other significant abnormality identified.  -XR L knee-No evidence of recent fracture or other significant detrimental interval change since the examinations referenced above.  Old healed left proximal fibular shaft fracture is noted.   --b/l 4th toe fractures, managing with darco shoe, per podiatry.  --fall precautions  --PT/OT, OOB. Recommended SNF.  --SW/CM following; pending insurance approval    Transaminitis  --present on admission  --trending up, and new.  --liver U/S unremarkable, hep panel -ve, HIV -ve  --Hepatology consulted; running AI markers KAYLAH, IgG, ASMA, AMA (+ve). However, suspect this is likely due to congestive hepatopathy given volume overload, elevated BNP, rEF on 2D Echo and high R heart pressures. Cardiology service consulted for mgmt recommendations. However, current plan is to continue HD as is; she would be difficult to implement GDMT due to baseline hypotension. F/u with cardiology re: recs.    Anemia in ESRD (end-stage renal disease)  -H&H stable on admit.  Oozing, bleeding noted from RUE wound. Elevated INR noted. Nursing instructed to place pressure bandage. No other overt bleeding.   - 1u pRBC ordered 7/7, but was unable to complete due to possible infiltration/pain in IV and inability to establish other access.   - Consulted PICC team.  -Additional pRBC transfused 7/11 for Hg<7    Paroxysmal A-fib  --pt at home on warfarin- held on admission due to ICH.  --restarted on 7/8; was initially "supratherapeutic," but suspect this was false reading as she is now subtherapeutic after holding dose  --Daily INR    Ulcer of lower limb  -- various stages of bilateral lower legs wounds  -- continue plavix, will hold off on ASA  -- Podiatry consulted-appreciate recs; will need f/u " as outpatient    Orthostatic hypotension  -continue home med Midodrine and fludricortisone  -map goal >60    ESRD on hemodialysis  --ESRD on HD, fistula left upper arm  --nephrology consulted- appreciate recs    Biventricular cardiac pacemaker in situ  - in place    Weakness generalized  PT/OT  Fall precautions    Acute on chronic systolic congestive heart failure  - volume control with HD  - ideally would have on GDMT; however, ability to titrate medications is limited 2/2 hypotension  - added on low dose lisinopril 2.5mg daily with hold precautions      Hyperlipidemia  --cont home med atorvastatin    Epilepsy  --Hx of seizures for which she is on keppra at home  --continue Keppra 500 mg BID  --Seizure precautions      VTE Risk Mitigation (From admission, onward)        Ordered     warfarin (COUMADIN) tablet 2.5 mg  Every Sunday 07/16/19 0838     warfarin (COUMADIN) tablet 5 mg  Every Tues, Thurs, Sat      07/16/19 0838     warfarin (COUMADIN) tablet 2.5 mg  Every Mon, Wed, Fri 07/16/19 0838                Gunner Khoury MD  Department of Hospital Medicine   [facility]

## 2019-07-18 NOTE — ASSESSMENT & PLAN NOTE
"--pt at home on warfarin- held on admission due to ICH.  --restarted on 7/8; was initially "supratherapeutic," but suspect this was false reading as she is now subtherapeutic after holding dose  --Daily INR  "

## 2019-07-18 NOTE — PROGRESS NOTES
[facility] H[facility]ODIALYSIS NOTE     Patient currently on hemodialysis for removal of uremic toxins and volume.     Patient seen and evaluated on hemodialysis, tolerating treatment, see HD flowsheet for vitals and assessments.      Ultrafiltration goal is 1-2L as tolerated (unable to weigh pt), keep map >65     Labs have been reviewed and the dialysate bath has been adjusted.     Assessment/Plan:    -Patient seen on HD, tolerating treatment well, w/o complaints   -Renal diet  -Strict I/O's and daily weights  -Daily renal function panels  -Hbg 8.3, continue Epo with HD  -Phos 5.4, continue renvela with meals   -Will continue to follow while inpatient       AYAN Levine, AGNP-C  Nephrology  Pager:  [phone]

## 2019-07-19 NOTE — PLAN OF CARE
07/19/19 1321   Final Note   Assessment Type Final Discharge Note   Anticipated Discharge Disposition SNF   What phone number can be called within the next 1-3 days to see how you are doing after discharge? [phone]   Hospital Follow Up  Appt(s) scheduled? Yes   Discharge plans and expectations educations in teach back method with documentation complete? Yes   Right Care Referral Info   Post Acute Recommendation SNF / Sub-Acute Rehab   Facility Name [facility]      Future Appointments   Date Time Provider Department Center   7/22/2019 11:00 AM VASCULAR, LAB [facility] VASCLAB [facility]   7/22/2019  2:00 PM Torrey Villafana MD [facility] VASCSUR [facility]   7/30/2019 11:00 AM [facility] OIC-CT1 500 LB LIMIT [facility]   7/30/2019  1:00 PM Maxwell Issa MD [facility] NEUROS7 [facility]   8/6/2019  3:20 PM Mari Benitez MD [facility] HEPAT [facility]   9/20/2019 10:00 AM [facility] EMG PROCEDURAL LAB [facility] NEURO [facility]   9/20/2019 12:00 PM EKG, APPT [facility] EKG [facility]   9/20/2019  1:00 PM COORDINATED DEVICE CHECK [facility] ARRRO [facility]   9/20/2019  2:00 PM Arabella Matthews NP [facility] ARRHYTH [facility]   10/1/2019  8:00 AM HOME MONITOR DEVICE CHECK, [facility] ARRJAKE [facility]

## 2019-07-19 NOTE — PLAN OF CARE
[facility]     Department of Hospital Medicine     [address] [place] [address]     [phone] after hours  [phone] fax       NURSING HOME ORDERS    07/19/2019    Admit to Nursing Home:  Skilled Bed                                                 Diagnoses:  Active Hospital Problems    Diagnosis  POA    *SDH (subdural hematoma) [S06.5X9A]  Yes    Foot ulcer [L97.509]  Yes    Fall [W19.XXXA]  Yes    Seizure prophylaxis [Z29.8]  Not Applicable    Fall at home, initial encounter [W19.XXXA, Y92.009]  Not Applicable    Obese [E66.9]  Yes    Transaminitis [R74.0]  Yes    Paroxysmal A-fib [I48.0]  Yes    Anemia in ESRD (end-stage renal disease) [N18.6, D63.1]  Yes    Ulcer of lower limb [L97.909]  Yes    Orthostatic hypotension [I95.1]  Yes    ESRD on hemodialysis [N18.6, Z99.2]  Not Applicable    Biventricular cardiac pacemaker in situ [Z95.0]  Yes    Weakness generalized [R53.1]  Yes    Acute on chronic systolic congestive heart failure [I50.23]  Yes    Hyperlipidemia [E78.5]  Yes    Epilepsy [G40.909]  Yes      Resolved Hospital Problems   No resolved problems to display.       Patient is homebound due to:  SDH (subdural hematoma)    Allergies:  Review of patient's allergies indicates:   Allergen Reactions    Bactrim [sulfamethoxazole-trimethoprim] Other (See Comments)     Causes renal failure    Nsaids (non-steroidal anti-inflammatory drug) Other (See Comments)     Renal failure       Vitals:       Every shift (Skilled Nursing patients)    Diet: diabetic diet: 2000 calorie and renal   Supplement:  1 can daily [facility] or equivalent    Acitivities:      - Up in a chair each morning as tolerated   - Ambulate with assistance to bathroom   - Scheduled walks once each shift (every 8 hours)   - May use walker, cane, or self-propelled wheelchair    LABS:  Per facility protocol    Nursing Precautions:         - Fall precautions per nursing  home protocol   - Seizure precaution per nursing home protocol    CONSULTS:    Physical Therapy to evaluate and treat     Occupational Therapy to evaluate and treat       Nutrition to evaluate and recommend diet      MISCELLANEOUS CARE:   Routine Skin for Bedridden Patients: Instruct patient/caregiver to apply moisture barrier cream to all skin folds and wet areas in perineal area daily and after baths and all bowel movements.  Home Oxygen:  Oxygen at 3 L/min nasal canula to be used:  Continuously. and Assess oxygen saturation via pulse oximeter as needed for increase in SOB.     WOUND CARE ORDERS  yes:  Foot Ulcer:  Location: left hallux     Dressing changes every week by applying medihoney and protective football as follows: apply medihoney to wounds, dress with 4x4's, cast padding, and coban                    DIABETES CARE:      Check blood sugar:      Fingerstick blood sugar a.m and p.m.          Report CBG < 60 or > 400 to physician.                                          Insulin Sliding Scale          Glucose  Novolog Insulin Subcutaneous        0 - 60   Orange juice or glucose tablet, hold insulin      No insulin   201-250  2 units   251-300  4 units   301-350  6 units   351-400  8 units   >400   10 units then call physician      Medications: Discontinue all previous medication orders, if any. See new list below.     Sisi Medel   Home Medication Instructions RANDALL:[id number]    Printed on:07/19/19 1124   Medication Information                      atorvastatin (LIPITOR) 80 MG tablet  TAKE 1 TABLET BY MOUTH EVERY EVENING             blood sugar diagnostic Strp  1 each by Misc.(Non-Drug; Combo Route) route once daily.             cinacalcet (SENSIPAR) 30 MG Tab  Take 30 mg by mouth daily with breakfast.             clopidogrel (PLAVIX) 75 mg tablet  TAKE 1 TABLET(75 MG) BY MOUTH EVERY DAY             fludrocortisone (FLORINEF) 0.1 mg Tab  Take 2 tablets (200 mcg total) by mouth 2 (two)  times daily.             lancets Misc  1 each by Misc.(Non-Drug; Combo Route) route once daily.             levETIRAcetam (KEPPRA) 500 MG Tab  TAKE 1 TABLET BY MOUTH TWICE DAILY             lisinopril (PRINIVIL,ZESTRIL) 2.5 MG tablet  Take 1 tablet (2.5 mg total) by mouth once daily. Hold for systolic BP <100             LORazepam (ATIVAN) 1 MG tablet  Take 0.5 tablets (0.5 mg total) by mouth every 8 (eight) hours as needed for Anxiety.             miconazole NITRATE 2 % (MICOTIN) 2 % top powder  Apply topically 2 (two) times daily.             midodrine (PROAMATINE) 10 MG tablet  Take 1 tablet (10 mg total) by mouth 3 (three) times daily.             multivitamin (ONE DAILY MULTIVITAMIN) per tablet  Take 1 tablet by mouth once daily.             nortriptyline (PAMELOR) 25 MG capsule  Take 1 capsule by mouth nightly             omeprazole (PRILOSEC) 20 MG capsule  TAKE 1 CAPSULE BY MOUTH EVERY DAY             patiromer calcium sorbitex (VELTASSA) 16.8 gram PwPk  Hold until follow-up with PCP             prednisoLONE acetate (PRED FORTE) 1 % DrpS  INSTILL ONE DROP INTO  LEFT EYE THREE TIMES A DAY             predniSONE (DELTASONE) 20 MG tablet  Take 1 tablet (20 mg total) by mouth once daily.             PROLENSA 0.07 % Drop  INSTILL 1 DROP IN LEFT / RIGHT EYE QD             DENISE-LINDA 0.8 mg Tab  TAKE 1 TABLET BY MOUTH ONCE DAILY             senna-docusate 8.6-50 mg (PERICOLACE) 8.6-50 mg per tablet  Take 1 tablet by mouth 2 (two) times daily.             sevelamer carbonate (RENVELA) 800 mg Tab  Take 1 tablet (800 mg total) by mouth daily with dinner or evening meal.             tiZANidine (ZANAFLEX) 4 MG tablet  Take 4 mg by mouth nightly as needed.             TRADJENTA 5 mg Tab tablet  TAKE 1 TABLET(5 MG) BY MOUTH EVERY DAY             warfarin (COUMADIN) 5 MG tablet  Take 1 tablet (5mg)  by mouth every Tues, Thurs, Sat and 1/2 tablet (2.5mg) every Mon, Wed, Fri. And Sun.                        _________________________________  Gunner Khoury MD  07/19/2019

## 2019-07-19 NOTE — PROGRESS NOTES
"[facility]  Adult Nutrition  Progress Note    SUMMARY       Recommendations    Recommendation:   1. Continue renal/DM/cardiac diet with fluid restriction per MD   2. Continue novasource renal daily   3. Encourage gradual wt loss   4.RD to monitor and follow up    Goals: pt to meet >75% of EEN/EPN while admitted   Nutrition Goal Status: goal met  Communication of RD Recs: (POC)    Reason for Assessment    Reason For Assessment: RD follow-up  Diagnosis: (SDH)  Relevant Medical History: ESRD; HTN; CM; CHF; obesity  Interdisciplinary Rounds: did not attend  General Information Comments: Pt continues with good intake, PO ~75% of meals. Continues drinking novasource BID. Note wt gain x1 week. Encourage pt to drink novasource only if PO <50% of meals. No c/o N/V/C/D reported. Pt well nourished.   Nutrition Discharge Planning: d/c on renal/DM diet     Nutrition Risk Screen    Nutrition Risk Screen: no indicators present    Nutrition/Diet History    Patient Reported Diet/Restrictions/Preferences: general  Spiritual, Cultural Beliefs, Rastafarian Practices, Values that Affect Care: no  Food Allergies: NKFA  Factors Affecting Nutritional Intake: None identified at this time    Anthropometrics    Temp: 98.3 °F (36.8 °C)  Height Method: Stated  Height: 5' 9" (175.3 cm)  Height (inches): 69 in  Weight Method: Bed Scale  Weight: 112 kg (246 lb 14.6 oz)  Weight (lb): 246.92 lb  Ideal Body Weight (IBW), Female: 145 lb  % Ideal Body Weight, Female (lb): 166.9 lb  BMI (Calculated): 35.8  BMI Grade: 35 - 39.9 - obesity - grade II  Usual Body Weight (UBW), k kg  % Usual Body Weight: 101.13    Lab/Procedures/Meds    Pertinent Labs Reviewed: reviewed  Pertinent Labs Comments: Na 127; BUN 43; Creatinine 3.9; Glucose 123  Pertinent Medications Reviewed: reviewed  Pertinent Medications Comments: warfarin; lisinopril; prednisone    Estimated/Assessed Needs    Weight Used For Calorie Calculations: 110 kg (242 lb 8.1 " oz)  Energy Calorie Requirements (kcal): 2166 kcal/day   Energy Need Method: (x1.25)  Protein Requirements:  g/day (0.8-1.0 g/kg)   Weight Used For Protein Calculations: 110 kg (242 lb 8.1 oz)  Fluid Requirements (mL): 1 mL/kcal or per MD  RDA Method (mL): 2166    Nutrition Prescription Ordered    Current Diet Order: Renal/DM/ cardiac   Nutrition Order Comments: 1000 mL fluid restriction   Oral Nutrition Supplement: novasource     Evaluation of Received Nutrient/Fluid Intake    Energy Calories Required: meeting needs  Protein Required: meeting needs  Fluid Required: meeting needs  Comments: LBM 7/17   Tolerance: tolerating  % Intake of Estimated Energy Needs: 75 - 100 %  % Meal Intake: 75 - 100 %    Nutrition Risk    Level of Risk/Frequency of Follow-up: low(1x/week)     Assessment and Plan    Nutrition Problem  altered nutrition related lab values     Related to (etiology):   ESRD/DM     Signs and Symptoms (as evidenced by):   Na 133; BUN 27; Creatinine 3.0; glucose 179      Intervention (treatment strategy):  Collaboration with other providers      Nutrition Diagnosis Status:   Continues    Monitor and Evaluation    Food and Nutrient Intake: energy intake, food and beverage intake  Food and Nutrient Adminstration: diet order  Knowledge/Beliefs/Attitudes: food and nutrition knowledge/skill  Anthropometric Measurements: weight, weight change  Biochemical Data, Medical Tests and Procedures: electrolyte and renal panel, lipid profile, gastrointestinal profile, inflammatory profile, glucose/endocrine profile  Nutrition-Focused Physical Findings: overall appearance     Nutrition Follow-Up    RD Follow-up?: Yes

## 2019-07-19 NOTE — ASSESSMENT & PLAN NOTE
--s/p fall at home from wheelchair  --XR alia pelvis-No acute displaced fracture or dislocation identified.  --XR L femur-Visualized osseous structures appear intact, with no definite evidence of recent fracture or other significant abnormality identified.  -XR L knee-No evidence of recent fracture or other significant detrimental interval change since the examinations referenced above.  Old healed left proximal fibular shaft fracture is noted.   --b/l 4th toe fractures, managing with darco shoe, per podiatry  --fall precautions  --PT/OT, OOB. Recommended SNF

## 2019-07-19 NOTE — DISCHARGE SUMMARY
[facility] Hospital Medicine  Discharge Summary      Patient Name: Sisi Medel  MRN: [id number]  Admission Date: 7/3/2019  Hospital Length of Stay: 16 days  Discharge Date and Time:  07/19/2019 2:19 PM  Attending Physician: Gunner Khoury, *   Discharging Provider: Gunner Khoury MD  Primary Care Provider: Carlos A Caputo MD  [facility] Medicine Team: [facility] R Gunner Khoury MD    HPI:   Per transferring physician:   The patient is a 58 year old female with PMHx of pulmonary sarcoidosis on 3-5L home oxygen(on prednisone), Class 3 HFrEF (3/2019 EF 35%), perm Afib/flutter s/p 6/2017 AVN ablation & PPM, ESRD (HD TTS-last dialysis yesterday), Type 2 DM (5/30/19 A1c 6.5%), pulmonary HTN, YOANDY, orthostatic hypotension, seizures (on keppra 500 mg Bid),  wheel chair bound admitted to [facility] s/p fall from wheel chair with right SDH. Patient's sister at bedside stated that patient's  fiance was was pushing her down a wheelchair ramp when they lost control and the patient fell off the side of the ramp out of her wheelchair into the grass. Pt reports brief LOC and complaining of  headache. Patient was at home on warfarin (for Afib/flutter) and Plavix. CT head reveals development of a thin hyperdense extra-axial collection overlying the right tentorium cerebella most compatible with acute subdural hemorrhage.  No significant mass effect or midline shift. Pending repeat CTH in 6 hours.  Kcentra, DDAVP and vitamin K initiated. NSGY following. Patient admitted to [facility] for close monitoring and higher level of care.      Of note patient has had frequent hospitalizations in the recent past. These admissions are typically due to volume overload, CHF exacerbation, and shortness of breath. She follows with pulmonology as an outpatient and is on chronic steroids. Per last outpatient note from Dr. Altamirano on 5/23/19, continued on prednisone 20mg daily for sarcoid as she has had difficulty  and increased hospitalizations on lower doses.    * No surgery found *      Hospital Course:   Trauma survey done showed no acute pelvic, knee or femur fractures. She had bilateral proximal 4th toe fractures which is being managed by Podiatry. She underwent a repeat CTH which was stable. Coumadin resumed and repeat CT at therapeutic dose was again stable. Otherwise, doing well. Will discharge to SNF with PT/OT. She will need coumadin clinic to continue following INR at SNF. Problem based discussion below.    Vitals:    07/19/19 0445 07/19/19 0708 07/19/19 1136 07/19/19 1237   BP: 110/70 109/79 97/66 97/66   BP Location: Right arm      Patient Position: Lying      Pulse: 70 69 72    Resp: 18 14 18    Temp: 97.5 °F (36.4 °C) 96.1 °F (35.6 °C) 98.3 °F (36.8 °C) 98.3 °F (36.8 °C)   TempSrc: Oral      SpO2: 98% 97% 100%    Weight:       Height:         Physical Exam   Constitutional: She is oriented to person, place, and time. She appears well-developed and well-nourished.   obese   HENT:   Head: Normocephalic.   Eyes: Pupils are equal, round, and reactive to light. Conjunctivae and EOM are normal. Right eye exhibits no discharge. Left eye exhibits no discharge. No scleral icterus.   Cardiovascular: Normal rate and regular rhythm.   Pulmonary/Chest: Effort normal. No tachypnea. She has rhonchi.   On oxygen via nasal cannula   Abdominal: Soft. She exhibits no distension.   Musculoskeletal: Normal range of motion. She exhibits no edema.   Neurological: She is alert and oriented to person, place, and time. She displays normal reflexes. No cranial nerve deficit or sensory deficit. She exhibits normal muscle tone. Coordination normal.   Psychiatric: Judgment normal. Her affect is labile. She exhibits a depressed mood.      Consults:   Consults (From admission, onward)        Status Ordering Provider     Inpatient consult to Cardiology  Once     Provider:  (Not yet assigned)    Completed MYKEL GREENFIELD     Inpatient  consult to Hepatology  Once     Provider:  (Not yet assigned)    Completed MYKEL GREENFIELD     Inpatient consult to Midline team  Once     Provider:  (Not yet assigned)    Completed MYKEL GREENFIELD     Inpatient consult to Midline team  Once     Provider:  (Not yet assigned)    Completed MYKEL GREENFIELD     Inpatient consult to Nephrology  Once     Provider:  (Not yet assigned)    Completed BALA KAMARA     Inpatient consult to Neuro ICU  Once     Provider:  (Not yet assigned)    Acknowledged ROCCO LEONARDO     Inpatient consult to Neurosurgery  Once     Provider:  (Not yet assigned)    Acknowledged MAHI TANNER     Inpatient consult to Podiatry  Once     Provider:  (Not yet assigned)    Completed CATINA BYRNE     Inpatient consult to [facility]  Once     Provider:  (Not yet assigned)    Completed MYKEL GREENFIELD     Inpatient consult to [facility]  Once     Provider:  (Not yet assigned)    Acknowledged COLTON THOMAS          * SDH (subdural hematoma)  --  Neurosurgery consulted-no surg intevention  -- CT Head revealed--Interval development of a thin hyperdense extra-axial collection overlying the right tentorium cerebella most compatible with acute subdural hemorrhage.  No significant mass effect or midline shift. Rpt CT 7/8 stable.  --  Managed medically with neuro checks and observation, BP mgmt. She was given DDAVP and Vit K.  --  Pt was on Coumadin and Plavix at home, INR on admit 3.4. AC initially held, subsequently restarted (Warfarin 5mg qT/Th/Sa and 2.5mg all other days) ordered on 7/8. She became therapeutic 7/14 and CTH was repeated (and stable).   -- resume plavix    Obese  Nutrition recs  Diet modification and counseling    Fall at home, initial encounter  --s/p fall at home from wheelchair  --XR alia pelvis-No acute displaced fracture or dislocation identified.  --XR L femur-Visualized osseous structures appear intact, with no definite  "evidence of recent fracture or other significant abnormality identified.  -XR L knee-No evidence of recent fracture or other significant detrimental interval change since the examinations referenced above.  Old healed left proximal fibular shaft fracture is noted.   --b/l 4th toe fractures, managing with darco shoe, per podiatry  --fall precautions  --PT/OT, OOB. Recommended SNF    Transaminitis  --present on admission  --liver U/S unremarkable, hep panel -ve, HIV -ve  --Hepatology consulted; running AI markers KAYLAH, IgG, ASMA, AMA (+ve). However, suspect this is likely due to congestive hepatopathy given volume overload, elevated BNP, rEF on 2D Echo and high R heart pressures. Cardiology service consulted for mgmt recommendations. However, current plan is to continue HD as is; she would be difficult to implement GDMT due to baseline hypotension. F/u with cardiology as outpatient    Anemia in ESRD (end-stage renal disease)  -H&H stable on admit.  Oozing, bleeding noted from RUE wound. Elevated INR noted. Nursing instructed to place pressure bandage. No other overt bleeding.   - 1u pRBC ordered 7/7, but was unable to complete due to possible infiltration/pain in IV and inability to establish other access.   - Consulted PICC team.  -Additional pRBC transfused 7/11 for Hg<7; now has had stable Hb for several days    Paroxysmal A-fib  --pt at home on warfarin- held on admission due to ICH.  --restarted on 7/8; was initially "supratherapeutic," but suspect this was false reading as she is now subtherapeutic after holding dose; she will need to continue to have close monitoring with coumadin clinic following discharge  --Daily INR    Ulcer of lower limb  -- various stages of bilateral lower legs wounds  -- continue plavix, will hold off on ASA  -- Podiatry consulted-appreciate recs; will need f/u as outpatient    Orthostatic hypotension  -continue home med Midodrine and fludricortisone  -map goal >60    ESRD on " hemodialysis  --ESRD on HD, fistula left upper arm  --nephrology consulted- appreciate recs    Biventricular cardiac pacemaker in situ  - in place    Weakness generalized  Discharge to SNF  Fall precautions    Acute on chronic systolic congestive heart failure  - volume control with HD  - ideally would have on GDMT; however, ability to titrate medications is limited 2/2 hypotension  - added on low dose lisinopril 2.5mg daily with hold precautions      Hyperlipidemia  --cont home med atorvastatin    Epilepsy  --Hx of seizures for which she is on keppra at home  --continue Keppra 500 mg BID  --Seizure precautions      Final Active Diagnoses:    Diagnosis Date Noted POA    PRINCIPAL PROBLEM:  SDH (subdural hematoma) [S06.5X9A] 07/03/2019 Yes    Foot ulcer [L97.509] 07/04/2019 Yes    Fall [W19.XXXA]  Yes    Seizure prophylaxis [Z29.8]  Not Applicable    Fall at home, initial encounter [W19.XXXA, Y92.009] 07/03/2019 Not Applicable    Obese [E66.9] 07/03/2019 Yes    Transaminitis [R74.0] 05/30/2019 Yes    Paroxysmal A-fib [I48.0] 05/10/2019 Yes    Anemia in ESRD (end-stage renal disease) [N18.6, D63.1] 05/10/2019 Yes    Ulcer of lower limb [L97.909] 04/01/2019 Yes    Orthostatic hypotension [I95.1] 03/25/2019 Yes    ESRD on hemodialysis [N18.6, Z99.2]  Not Applicable    Biventricular cardiac pacemaker in situ [Z95.0] 08/04/2017 Yes    Weakness generalized [R53.1] 02/17/2017 Yes    Acute on chronic systolic congestive heart failure [I50.23] 03/09/2016 Yes    Hyperlipidemia [E78.5] 03/07/2016 Yes    Epilepsy [G40.909] 03/19/2013 Yes      Problems Resolved During this Admission:       Discharged Condition: good    Disposition: Skilled Nursing Facility    Follow Up:    Patient Instructions:      Ambulatory Referral to Podiatry   Referral Priority: Routine Referral Type: Consultation   Referral Reason: Specialty Services Required   Requested Specialty: Podiatry   Number of Visits Requested: 1     Diet diabetic      Diet renal     Notify your health care provider if you experience any of the following:  temperature >100.4     Notify your health care provider if you experience any of the following:  severe uncontrolled pain     Notify your health care provider if you experience any of the following:  difficulty breathing or increased cough     Notify your health care provider if you experience any of the following:  persistent dizziness, light-headedness, or visual disturbances     Notify your health care provider if you experience any of the following:  severe persistent headache     Activity as tolerated       Significant Diagnostic Studies: Labs:   BMP:   Recent Labs   Lab 07/18/19  0442 07/19/19  0444   GLU 92 123*   * 127*   K 4.6 4.5   CL 90* 96   CO2 24 17*   BUN 70* 43*   CREATININE 5.9* 3.9*   CALCIUM 9.1 9.4   MG 2.3 2.2   , CMP   Recent Labs   Lab 07/18/19 0442 07/19/19 0444   * 127*   K 4.6 4.5   CL 90* 96   CO2 24 17*   GLU 92 123*   BUN 70* 43*   CREATININE 5.9* 3.9*   CALCIUM 9.1 9.4   PROT 6.8 7.0   ALBUMIN 2.8* 2.7*   BILITOT 1.0 0.9   ALKPHOS 220* 211*   AST 48* 49*   * 107*   ANIONGAP 17* 14   ESTGFRAFRICA 8.5* 13.9*   EGFRNONAA 7.3* 12.1*   , CBC   Recent Labs   Lab 07/18/19 0443 07/19/19 0444   WBC 10.90 10.06   HGB 8.3* 8.5*   HCT 28.8* 29.0*    217   , INR   Lab Results   Component Value Date    INR 1.2 07/19/2019    INR 1.5 (H) 07/18/2019    INR 1.9 (H) 07/17/2019   , Lipid Panel   Lab Results   Component Value Date    CHOL 169 12/08/2017    HDL 58 12/08/2017    LDLCALC 91.4 12/08/2017    TRIG 98 12/08/2017    CHOLHDL 34.3 12/08/2017   , Troponin No results for input(s): TROPONINI in the last 168 hours., A1C:   Recent Labs   Lab 05/10/19  0532 05/30/19  1833 07/03/19  1127   HGBA1C 6.0* 6.5* 6.1*    and All labs within the past 24 hours have been reviewed  Radiology: CT scan: head as above  Cardiac Graphics: Echocardiogram:   Transthoracic echo (TTE) complete (Cupid  Only):   Results for orders placed or performed during the hospital encounter of 07/03/19   Transthoracic echo (TTE) 2D with Color Flow   Result Value Ref Range    Ascending aorta 3.41 cm    STJ 2.99 cm    AV mean gradient 5 mmHg    Ao peak archie 1.31 m/s    Ao VTI 17.98 cm    IVRT 0.12 msec    IVS 0.89 0.6 - 1.1 cm    LA size 4.68 cm    Left Atrium Major Axis 8.21 cm    Left Atrium Minor Axis 8.17 cm    LVIDD 5.72 3.5 - 6.0 cm    LVIDS 4.66 (A) 2.1 - 4.0 cm    LVOT diameter 2.32 cm    LVOT peak VTI 15.69 cm    PW 0.74 0.6 - 1.1 cm    MV Peak A Archie 0.47 m/s    E wave decelartion time 253.26 msec    MV Peak E Archie 1.01 m/s    RA Major Axis 7.28 cm    RA Width 4.71 cm    RVDD 5.00 cm    Sinus 3.18 cm    TAPSE 1.26 cm    TR Max Archie 3.30 m/s    LA WIDTH 4.83 cm    LV Diastolic Volume 161.50 mL    LV Systolic Volume 100.15 mL    LVOT peak archie 0.98 m/s    FS 19 %    LA volume 157.36 cm3    LV mass 175.25 g    Left Ventricle Relative Wall Thickness 0.26 cm    AV valve area 3.69 cm2    AV Velocity Ratio 0.75     AV index (prosthetic) 0.87     E/A ratio 2.15     LVOT area 4.2 cm2    LVOT stroke volume 66.29 cm3    AV peak gradient 7 mmHg    LV Systolic Volume Index 44.7 mL/m2    LV Diastolic Volume Index 72.10 mL/m2    LA Volume Index 70.3 mL/m2    LV Mass Index 78 g/m2    Triscuspid Valve Regurgitation Peak Gradient 44 mmHg    BSA 2.31 m2    Right Atrial Pressure (from IVC) 15 mmHg    TV rest pulmonary artery pressure 59 mmHg    Narrative    · Left ventricular enlargement. Severely decreased left ventricular   systolic function. The estimated ejection fraction is 25% Local segmental   wall motion abnormalities.  · Severe right ventricular enlargement. Moderately reduced right   ventricular systolic function.  · Severe left atrial enlargement.  · Elevated left atrial pressure.  · Severe right atrial enlargement.  · Moderate mitral regurgitation.  · Mild to moderate tricuspid regurgitation.  · Mild pulmonic regurgitation.  ·  Elevated central venous pressure (15 mm Hg).  · The estimated PA systolic pressure is 59 mm Hg  · Atrial fibrillation observed.          Pending Diagnostic Studies:     Procedure Component Value Units Date/Time    Magnesium [[id number]] Collected:  07/04/19 0242    Order Status:  Sent Lab Status:  In process Updated:  07/04/19 0249    Specimen:  Blood     Phosphorus [[id number]] Collected:  07/04/19 0242    Order Status:  Sent Lab Status:  In process Updated:  07/04/19 0246    Specimen:  Blood          Medications:  Reconciled Home Medications:      Medication List      START taking these medications    lisinopril 2.5 MG tablet  Commonly known as:  PRINIVIL,ZESTRIL  Take 1 tablet (2.5 mg total) by mouth once daily. Hold for systolic BP <100     senna-docusate 8.6-50 mg 8.6-50 mg per tablet  Commonly known as:  PERICOLACE  Take 1 tablet by mouth 2 (two) times daily.        CHANGE how you take these medications    blood sugar diagnostic Strp  1 each by Misc.(Non-Drug; Combo Route) route once daily.  What changed:  when to take this     fludrocortisone 0.1 mg Tab  Commonly known as:  FLORINEF  Take 2 tablets (200 mcg total) by mouth 2 (two) times daily.  What changed:  how much to take     lancets Misc  1 each by Misc.(Non-Drug; Combo Route) route once daily.  What changed:  when to take this        CONTINUE taking these medications    atorvastatin 80 MG tablet  Commonly known as:  LIPITOR  TAKE 1 TABLET BY MOUTH EVERY EVENING     cinacalcet 30 MG Tab  Commonly known as:  SENSIPAR  Take 30 mg by mouth daily with breakfast.     clopidogrel 75 mg tablet  Commonly known as:  PLAVIX  TAKE 1 TABLET(75 MG) BY MOUTH EVERY DAY     levETIRAcetam 500 MG Tab  Commonly known as:  KEPPRA  TAKE 1 TABLET BY MOUTH TWICE DAILY     LORazepam 1 MG tablet  Commonly known as:  ATIVAN  Take 0.5 tablets (0.5 mg total) by mouth every 8 (eight) hours as needed for Anxiety.     miconazole NITRATE 2 % 2 % top powder  Commonly known as:   MICOTIN  Apply topically 2 (two) times daily.     midodrine 10 MG tablet  Commonly known as:  PROAMATINE  Take 1 tablet (10 mg total) by mouth 3 (three) times daily.     nortriptyline 25 MG capsule  Commonly known as:  PAMELOR  Take 1 capsule by mouth nightly     omeprazole 20 MG capsule  Commonly known as:  PRILOSEC  TAKE 1 CAPSULE BY MOUTH EVERY DAY     ONE DAILY MULTIVITAMIN per tablet  Generic drug:  multivitamin  Take 1 tablet by mouth once daily.     patiromer calcium sorbitex 16.8 gram Pwpk  Commonly known as:  VELTASSA  Hold until follow-up with PCP     prednisoLONE acetate 1 % Drps  Commonly known as:  PRED FORTE  INSTILL ONE DROP INTO  LEFT EYE THREE TIMES A DAY     predniSONE 20 MG tablet  Commonly known as:  DELTASONE  Take 1 tablet (20 mg total) by mouth once daily.     PROLENSA 0.07 % Drop  Generic drug:  bromfenac  INSTILL 1 DROP IN LEFT / RIGHT EYE QD     DENISE-LINDA 0.8 mg Tab  Generic drug:  B complex-vitamin C-folic acid  TAKE 1 TABLET BY MOUTH ONCE DAILY     sevelamer carbonate 800 mg Tab  Commonly known as:  RENVELA  Take 1 tablet (800 mg total) by mouth daily with dinner or evening meal.     tiZANidine 4 MG tablet  Commonly known as:  ZANAFLEX  Take 4 mg by mouth nightly as needed.     TRADJENTA 5 mg Tab tablet  Generic drug:  linaGLIPtin  TAKE 1 TABLET(5 MG) BY MOUTH EVERY DAY     warfarin 5 MG tablet  Commonly known as:  COUMADIN  Take 1 tablet (5mg)  by mouth every Tues, Thurs, Sat and 1/2 tablet (2.5mg) every Mon, Wed, Fri. And Sun.        STOP taking these medications    aspirin 81 MG EC tablet  Commonly known as:  ECOTRIN            Indwelling Lines/Drains at time of discharge:   Lines/Drains/Airways     Drain                 Hemodialysis AV Graft Left upper arm -- days                Time spent on the discharge of patient: 35 minutes  Patient was seen and examined on the date of discharge and determined to be suitable for discharge.         Gunner Khoury MD  Department of Hospital  Medicine  [facility]

## 2019-07-19 NOTE — ASSESSMENT & PLAN NOTE
-H&H stable on admit.  Oozing, bleeding noted from RUE wound. Elevated INR noted. Nursing instructed to place pressure bandage. No other overt bleeding.   - 1u pRBC ordered 7/7, but was unable to complete due to possible infiltration/pain in IV and inability to establish other access.   - Consulted PICC team.  -Additional pRBC transfused 7/11 for Hg<7; now has had stable Hb for several days

## 2019-07-19 NOTE — PLAN OF CARE
Problem: Physical Therapy Goal  Goal: Physical Therapy Goal  Goals to be met by: 2019  (10 days)    Patient will increase functional independence with mobility by performin. Supine to sit with Modified [place]. Met (2019)  2. Sit to supine with supervision  3. Sit to stand transfer with Contact Guard Assistance with RW and maintaining any weight bearing precautions  4. Bed to chair transfer with Contact Guard Assistance using Rolling Walker and maintaining any weight bearing precautions.  5. Gait  x 30 feet with Contact Guard Assistance using Rolling Walker. and maintaining any weight bearing precautions  6. Lower extremity exercise program x20 reps per handout, with assistance as needed        Outcome: Ongoing (interventions implemented as appropriate)  Updated plan of care to reflect patient's current functional status. Patient has made limited progress towards goals, she tends to be self-limiting due to fear of falling. Continue with plan of care.   Yolanda Arora, PT   eustachian tube dysfunction

## 2019-07-19 NOTE — PLAN OF CARE
Pt is going to [facility], Report can be called to [phone], pt will be going to room 13B, [place] [id number]- will set-up wheelchair transportation for 2:30pm. Nurse Notified.

## 2019-07-19 NOTE — PT/OT/SLP PROGRESS
"     Physical Therapy  Pt Not Seen    Patient Name:  Sisi Medel   MRN:  [id number]    Patient not seen today secondary to  Patient unwilling to participate(2 attempts for tx session: 1. Pt refused tx session stating "I'm leaving today" 9:56 am Pt agreeable to PTA returning in pm if still not discharged; 2. Pt found soiled in bed.  Pt reports "She's coming to clean me"  1:45 pm.  PTA unable to return for 3rd attempt). Will follow-up on next scheduled visit.    Keri Campos, PTA  7/19/2019    "

## 2019-07-19 NOTE — ASSESSMENT & PLAN NOTE
--present on admission  --liver U/S unremarkable, hep panel -ve, HIV -ve  --Hepatology consulted; running AI markers KAYLAH, IgG, ASMA, AMA (+ve). However, suspect this is likely due to congestive hepatopathy given volume overload, elevated BNP, rEF on 2D Echo and high R heart pressures. Cardiology service consulted for mgmt recommendations. However, current plan is to continue HD as is; she would be difficult to implement GDMT due to baseline hypotension. F/u with cardiology as outpatient

## 2019-07-19 NOTE — ASSESSMENT & PLAN NOTE
"--pt at home on warfarin- held on admission due to ICH.  --restarted on 7/8; was initially "supratherapeutic," but suspect this was false reading as she is now subtherapeutic after holding dose; she will need to continue to have close monitoring with coumadin clinic following discharge  --Daily INR  "

## 2019-07-19 NOTE — NURSING
Discharge instructions given to patient and family.  Patient verbalized understanding and had no further questions.  Patient's IV removed and vital signs within normal limits.  Patient now awaiting for wheelchair transportation for discharge to nursing facility.  Report called to [facility].  Will continue to monitor.  Dressings changed to bilateral lower extremities.

## 2019-07-19 NOTE — PT/OT/SLP PROGRESS
Occupational Therapy      Patient Name:  Sisi Medel   MRN:  [id number]    Patient not seen today secondary to patient refusing OT session this date due to not feeling well. Will follow-up next treatment session.    BALTA Reyez  7/19/2019
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
## 2019-07-22 NOTE — PROGRESS NOTES
Sisi Medel  7/22/2019    HPI:  Patient is a 55 y.o.  female with a h/o HTN, HLD, CHF (EF 20%), COPD on home O2, paroxysmal Afib (on Coumadin), seizure disorder, ESRD on HD TThS via LUE AVG (placed 2/3/16), with serial interventions required.     She has been undergoing RLE venous ulcer care in our wound clinic and has been seeing podiatry. Reports that this is healing slowly.      Patient takes ASA, statin, Plavix, coumadin.    S/p   2/3/16: LUE AVG creation (Dr Villafana)  6/21/16: Percutaneous mechanical thrombectomy w Possis Angiojet AVX; 4fr OTW embolectomy of arterial inflow   PTA outflow stenosis with a 7x40 mm balloon  10/12/16: fistulogram (75% stenosis noted), left upper extremity AV graft PTA venous outflow with resolution of stenosis noted  2/2017 Declot and venous outflow PTA and stent with 8 x 50 VIABAHN  5/15/2017: PTA outflow stenosis (8x60 mustang); Stent outflow stenosis (8x50 Vi[place]ahn)  10/12/17: PTA LUE AV graft (brachial vein) x2: (7x60 [place]ard); (8x40 [place])   01/10/18: PTA outflow stensois (8x40 [place]) and (9x40 [place])   2/2/18: PTA arterial inflow (7 x 40 [place])  8/2018: 1.  PTA outflow stenosis 8 x 40 [place]; PTA outflow 10 x 40 [place] balloon.  1/2019: PTA LUE AVG outflow 9 x 60 [employer] Coated Balloon and 8 x 40 [place]  4/4019: PTA LUE AVG outflow stenosis: 8 x 40 [place] and 10 x 40 [place]    No MI/stroke  Tobacco use: Former smoker     Past Medical History   Diagnosis Date    Anemia in ESRD (end-stage renal disease) 3/7/2016    Cervical radiculopathy 7/20/2015    Chronic combined systolic and diastolic heart failure 6/14/2013     EF 20% 2013, improved with Medical therapy, negative PET 2013    Chronic respiratory failure with hypoxia 04/22/2013     On home oxygen at 2-5 liters    Chronic rhinitis 10/2/2013    DDD (degenerative disc disease), lumbar 6/17/2015    ESRD on hemodialysis 2/23/2016    Essential hypertension     Gout     Hyperlipidemia  3/7/2016    Lateral meniscal tear 2016    Lumbar stenosis 2015    Morbid obesity 8/15/2013    Paroxysmal atrial fibrillation 2014     Not on anticoagulation    Peripheral neuropathy 2013    Personal history of fall 2014    Personal history of gastric ulcer 3/19/2013    Sarcoidosis, lung 2009     Diagnosed in  with ocular manifestation, on 4L home O2 and PO steroids    Secondary pulmonary hypertension 2015    Seizure disorder 3/19/2013    Shingles 2013    Spondylarthrosis 2015     Past Surgical History   Procedure Laterality Date     section, classic      Abdominal surgery      Vascular surgery       Family History   Problem Relation Age of Onset    Kidney failure Mother     Hypertension Mother     Diabetes Mother     Coronary artery disease Father     Hypertension Father     Diabetes Father     Lupus Sister     Diabetes Maternal Grandmother     Asthma Neg Hx     Emphysema Neg Hx     Melanoma Neg Hx     Psoriasis Neg Hx      Social History     Social History    Marital status: Single     Spouse name: N/A    Number of children: N/A    Years of education: N/A     Occupational History    Not on file.     Social History Main Topics    Smoking status: Former Smoker     Packs/day: 0.50     Years: 10.00     Types: Cigarettes     Quit date: 1994    Smokeless tobacco: Never Used    Alcohol use No    Drug use: No    Sexual activity: No     Other Topics Concern    Not on file     Social History Narrative    Lives with boyfriend/partner who helps with her care.     Plans to travel to [place] for 2 weeks in 2016     Current Outpatient Prescriptions on File Prior to Visit   Medication Sig    ACZONE 5 % topical gel Apply topically once daily.     allopurinol (ZYLOPRIM) 100 MG tablet Take 1 tablet (100 mg total) by mouth once daily.    amiodarone (PACERONE) 200 MG Tab Take 1 tablet (200 mg total) by mouth every morning.    ammonium  lactate (LAC-HYDRIN) 12 % lotion Apply topically as needed (for scars on arms).     aspirin 81 MG Chew Take 81 mg by mouth every morning.    atorvastatin (LIPITOR) 80 MG tablet Take 80 mg by mouth once daily.     capsicum 0.075% (ZOSTRIX) 0.075 % topical cream Apply topically 3 (three) times daily. For neuropathic pain of feet    gabapentin (NEURONTIN) 100 MG capsule TAKE ONE CAPSULE BY MOUTH THREE TIMES DAILY (Patient taking differently: TAKE ONE CAPSULE BY MOUTH THREE TIMES DAILY-noon, evening, bedtime)    levetiracetam (KEPPRA) 500 MG Tab TAKE 1 TABLET BY MOUTH TWICE DAILY.    melatonin 5 mg Tab Take 1 tablet by mouth nightly.    midodrine (PROAMATINE) 10 MG tablet     midodrine (PROAMATINE) 5 MG Tab     NEPHRO-LINDA 0.8 mg Tab TK 1 T PO  QD    ondansetron (ZOFRAN-ODT) 8 MG TbDL Take 1 tablet (8 mg total) by mouth every 8 (eight) hours as needed.    oxycodone-acetaminophen (PERCOCET) 7.5-325 mg per tablet Take 1 tablet by mouth every 4 (four) hours as needed.    predniSONE (DELTASONE) 10 MG tablet TAKE 3 TABLETS BY MOUTH FOR 7 DAYS, THEN 2 TABLETS FOR 7 DAYS, THEN 1 TABLET EVERY DAY AFTER.    RENVELA 800 mg Tab TAKE 1 TABLET BY MOUTH THREE TIMES DAILY WITH MEALS    tizanidine 4 mg Cap Take 4 mg by mouth 2 (two) times daily as needed. (Patient taking differently: Take 4 mg by mouth daily as needed (for muscle spasms.). )    warfarin (COUMADIN) 5 MG tablet TAKE 1 TABLET(5 MG) BY MOUTH DAILY     No current facility-administered medications on file prior to visit.        REVIEW OF SYSTEMS:  General: negative; ENT: negative; Allergy and Immunology: negative; Hematological and Lymphatic: Negative; Endocrine: negative; Respiratory: no cough, increased shortness of breath this morning, or wheezing; Cardiovascular: no chest pain or dyspnea on exertion; Gastrointestinal: no abdominal pain/back, change in bowel habits, or bloody stools; Genito-Urinary: no dysuria, trouble voiding, or hematuria;  Musculoskeletal: see HPI  Neurological: no TIA or stroke symptoms; Psychiatric: no nervousness, anxiety or depression.    PHYSICAL EXAM:   Vitals:    07/22/19 1448   BP: (!) 76/50   Pulse: 70   Temp: 97 °F (36.1 °C)         General appearance:  Alert, well-appearing, and in no distress.  Oriented to person, place, and time   Neurological:  Normal speech, no focal findings noted; CN II - XII grossly intact           Musculoskeletal: Digits/nail without cyanosis/clubbing.  Normal muscle strength/tone.                 Neck: Supple, no significant adenopathy; thyroid is not enlarged                Chest:  Clear to auscultation, symmetric air entry      No use of accessory muscles             Cardiac: Normal rate and regular rhythm, S1 and S2 normal; PMI non-displaced          Abdomen: Soft, non-tender, non-distended, no masses or organomegaly      Extremities:   LUE AVG with palpable thrill, pulsatile, 2+ distal radial pulse;       RLE with hyperpigmentation, induration from distal calf to proximal foot, 2 x 2 cm superficial venous ulceration with mild serous exudate seen on wound care imaging          LAB RESULTS:  Lab Results   Component Value Date    WBC 10.06 07/19/2019    HGB 8.5 (L) 07/19/2019    HCT 29.0 (L) 07/19/2019    MCV 88 07/19/2019     07/19/2019     BMP  Lab Results   Component Value Date     (L) 07/19/2019    K 4.5 07/19/2019    CL 96 07/19/2019    CO2 17 (L) 07/19/2019    BUN 43 (H) 07/19/2019    CREATININE 3.9 (H) 07/19/2019    CALCIUM 9.4 07/19/2019    ANIONGAP 14 07/19/2019    ESTGFRAFRICA 13.9 (A) 07/19/2019    EGFRNONAA 12.1 (A) 07/19/2019     Lab Results   Component Value Date    HGBA1C 6.1 (H) 07/03/2019       IMAGING:  EXAM KANDIS: 07/22/2019 13:52  REF PHYS: NATHALIE BUTT    Indication:  -End Stage Renal Disease on Dialysis.     Finding:  Hemodialysis access graft                                                                                                                                                                                                                                                                                                                                                                                                                                                                                                                                                                                                                                                                                                                                                                                                                                                                                                                                                                                                                                                  Velocities: in cm./sec  Inflow- 82  arterial anastomosis-264  proximal graft- 89  mid graft- 44  distal graft - 42  venous anastomosis-40  venous outflow (stent) - 28  venous outflow distal- 488 ; distal to stenosis - 157                 Volume flow: 503 in ml./min.     Overall Impression:  Color flow duplex exam reveals a patent left hemodialysis AV graft and outflow stents. A visible narrowing and elevated PSVs of 488 cm/s is noted just beyond the outflow stent. This finding is suggestive of a >75%focal hemodynamically significant   stenosis. Volume flow at mid graft level measures 503 ml/min.    IMP/PLAN:  55 y.o. female with HTN, HLD, CHF (EF 20%), COPD on home O2, paroxysmal Afib (on Coumadin), seizure disorder, ESRD on HD TThS via LUE AVG. Duplex today shows recurrent outflow stenosis and decreased flow volume, will require fistulagram with graft access.     1) LUE fistulagram next week (graft access)  2) continue coumadin, statin, asa    Torrey Villafana MD  Vascular & Endovascular Surgery

## 2019-07-22 NOTE — H&P (VIEW-ONLY)
Sisi Medel  7/22/2019    HPI:  Patient is a 55 y.o.  female with a h/o HTN, HLD, CHF (EF 20%), COPD on home O2, paroxysmal Afib (on Coumadin), seizure disorder, ESRD on HD TThS via LUE AVG (placed 2/3/16), with serial interventions required.     She has been undergoing RLE venous ulcer care in our wound clinic and has been seeing podiatry. Reports that this is healing slowly.      Patient takes ASA, statin, Plavix, coumadin.    S/p   2/3/16: LUE AVG creation (Dr Villafana)  6/21/16: Percutaneous mechanical thrombectomy w Possis Angiojet AVX; 4fr OTW embolectomy of arterial inflow   PTA outflow stenosis with a 7x40 mm balloon  10/12/16: fistulogram (75% stenosis noted), left upper extremity AV graft PTA venous outflow with resolution of stenosis noted  2/2017 Declot and venous outflow PTA and stent with 8 x 50 VIABAHN  5/15/2017: PTA outflow stenosis (8x60 mustang); Stent outflow stenosis (8x50 Viabahn)  10/12/17: PTA LUE AV graft (brachial vein) x2: (7x60 [place]); (8x40 Gaston)   01/10/18: PTA outflow stensois (8x40 Gaston) and (9x40 Gaston)   2/2/18: PTA arterial inflow (7 x 40 Gaston)  8/2018: 1.  PTA outflow stenosis 8 x 40 Gaston; PTA outflow 10 x 40 Gaston balloon.  1/2019: PTA LUE AVG outflow 9 x 60 L[employer] Drug Coated Balloon and 8 x 40 Gaston  4/4019: PTA LUE AVG outflow stenosis: 8 x 40 Gaston and 10 x 40 Gaston    No MI/stroke  Tobacco use: Former smoker     Past Medical History   Diagnosis Date    Anemia in ESRD (end-stage renal disease) 3/7/2016    Cervical radiculopathy 7/20/2015    Chronic combined systolic and diastolic heart failure 6/14/2013     EF 20% 2013, improved with Medical therapy, negative PET 2013    Chronic respiratory failure with hypoxia 04/22/2013     On home oxygen at 2-5 liters    Chronic rhinitis 10/2/2013    DDD (degenerative disc disease), lumbar 6/17/2015    ESRD on hemodialysis 2/23/2016    Essential hypertension     Gout     Hyperlipidemia  3/7/2016    Lateral meniscal tear 2016    Lumbar stenosis 2015    Morbid obesity 8/15/2013    Paroxysmal atrial fibrillation 2014     Not on anticoagulation    Peripheral neuropathy 2013    Personal history of fall 2014    Personal history of gastric ulcer 3/19/2013    Sarcoidosis, lung 2009     Diagnosed in  with ocular manifestation, on 4L home O2 and PO steroids    Secondary pulmonary hypertension 2015    Seizure disorder 3/19/2013    Shingles 2013    Spondylarthrosis 2015     Past Surgical History   Procedure Laterality Date     section, classic      Abdominal surgery      Vascular surgery       Family History   Problem Relation Age of Onset    Kidney failure Mother     Hypertension Mother     Diabetes Mother     Coronary artery disease Father     Hypertension Father     Diabetes Father     Lupus Sister     Diabetes Maternal Grandmother     Asthma Neg Hx     Emphysema Neg Hx     Melanoma Neg Hx     Psoriasis Neg Hx      Social History     Social History    Marital status: Single     Spouse name: N/A    Number of children: N/A    Years of education: N/A     Occupational History    Not on file.     Social History Main Topics    Smoking status: Former Smoker     Packs/day: 0.50     Years: 10.00     Types: Cigarettes     Quit date: 1994    Smokeless tobacco: Never Used    Alcohol use No    Drug use: No    Sexual activity: No     Other Topics Concern    Not on file     Social History Narrative    Lives with boyfriend/partner who helps with her care.     Plans to travel to [place] for 2 weeks in 2016     Current Outpatient Prescriptions on File Prior to Visit   Medication Sig    ACZONE 5 % topical gel Apply topically once daily.     allopurinol (ZYLOPRIM) 100 MG tablet Take 1 tablet (100 mg total) by mouth once daily.    amiodarone (PACERONE) 200 MG Tab Take 1 tablet (200 mg total) by mouth every morning.    ammonium  lactate (LAC-HYDRIN) 12 % lotion Apply topically as needed (for scars on arms).     aspirin 81 MG Chew Take 81 mg by mouth every morning.    atorvastatin (LIPITOR) 80 MG tablet Take 80 mg by mouth once daily.     capsicum 0.075% (ZOSTRIX) 0.075 % topical cream Apply topically 3 (three) times daily. For neuropathic pain of feet    gabapentin (NEURONTIN) 100 MG capsule TAKE ONE CAPSULE BY MOUTH THREE TIMES DAILY (Patient taking differently: TAKE ONE CAPSULE BY MOUTH THREE TIMES DAILY-noon, evening, bedtime)    levetiracetam (KEPPRA) 500 MG Tab TAKE 1 TABLET BY MOUTH TWICE DAILY.    melatonin 5 mg Tab Take 1 tablet by mouth nightly.    midodrine (PROAMATINE) 10 MG tablet     midodrine (PROAMATINE) 5 MG Tab     NEPHRO-LINDA 0.8 mg Tab TK 1 T PO  QD    ondansetron (ZOFRAN-ODT) 8 MG TbDL Take 1 tablet (8 mg total) by mouth every 8 (eight) hours as needed.    oxycodone-acetaminophen (PERCOCET) 7.5-325 mg per tablet Take 1 tablet by mouth every 4 (four) hours as needed.    predniSONE (DELTASONE) 10 MG tablet TAKE 3 TABLETS BY MOUTH FOR 7 DAYS, THEN 2 TABLETS FOR 7 DAYS, THEN 1 TABLET EVERY DAY AFTER.    RENVELA 800 mg Tab TAKE 1 TABLET BY MOUTH THREE TIMES DAILY WITH MEALS    tizanidine 4 mg Cap Take 4 mg by mouth 2 (two) times daily as needed. (Patient taking differently: Take 4 mg by mouth daily as needed (for muscle spasms.). )    warfarin (COUMADIN) 5 MG tablet TAKE 1 TABLET(5 MG) BY MOUTH DAILY     No current facility-administered medications on file prior to visit.        REVIEW OF SYSTEMS:  General: negative; ENT: negative; Allergy and Immunology: negative; Hematological and Lymphatic: Negative; Endocrine: negative; Respiratory: no cough, increased shortness of breath this morning, or wheezing; Cardiovascular: no chest pain or dyspnea on exertion; Gastrointestinal: no abdominal pain/back, change in bowel habits, or bloody stools; Genito-Urinary: no dysuria, trouble voiding, or hematuria;  Musculoskeletal: see HPI  Neurological: no TIA or stroke symptoms; Psychiatric: no nervousness, anxiety or depression.    PHYSICAL EXAM:   Vitals:    07/22/19 1448   BP: (!) 76/50   Pulse: 70   Temp: 97 °F (36.1 °C)         General appearance:  Alert, well-appearing, and in no distress.  Oriented to person, place, and time   Neurological:  Normal speech, no focal findings noted; CN II - XII grossly intact           Musculoskeletal: Digits/nail without cyanosis/clubbing.  Normal muscle strength/tone.                 Neck: Supple, no significant adenopathy; thyroid is not enlarged                Chest:  Clear to auscultation, symmetric air entry      No use of accessory muscles             Cardiac: Normal rate and regular rhythm, S1 and S2 normal; PMI non-displaced          Abdomen: Soft, non-tender, non-distended, no masses or organomegaly      Extremities:   LUE AVG with palpable thrill, pulsatile, 2+ distal radial pulse;       RLE with hyperpigmentation, induration from distal calf to proximal foot, 2 x 2 cm superficial venous ulceration with mild serous exudate seen on wound care imaging          LAB RESULTS:  Lab Results   Component Value Date    WBC 10.06 07/19/2019    HGB 8.5 (L) 07/19/2019    HCT 29.0 (L) 07/19/2019    MCV 88 07/19/2019     07/19/2019     BMP  Lab Results   Component Value Date     (L) 07/19/2019    K 4.5 07/19/2019    CL 96 07/19/2019    CO2 17 (L) 07/19/2019    BUN 43 (H) 07/19/2019    CREATININE 3.9 (H) 07/19/2019    CALCIUM 9.4 07/19/2019    ANIONGAP 14 07/19/2019    ESTGFRAFRICA 13.9 (A) 07/19/2019    EGFRNONAA 12.1 (A) 07/19/2019     Lab Results   Component Value Date    HGBA1C 6.1 (H) 07/03/2019       IMAGING:  EXAM KANDIS: 07/22/2019 13:52  REF PHYS: NATHALIE BUTT    Indication:  -End Stage Renal Disease on Dialysis.     Finding:  Hemodialysis access graft                                                                                                                                                                                                                                                                                                                                                                                                                                                                                                                                                                                                                                                                                                                                                                                                                                                                                                                                                                                                                                                  Velocities: in cm./sec  Inflow- 82  arterial anastomosis-264  proximal graft- 89  mid graft- 44  distal graft - 42  venous anastomosis-40  venous outflow (stent) - 28  venous outflow distal- 488 ; distal to stenosis - 157                 Volume flow: 503 in ml./min.     Overall Impression:  Color flow duplex exam reveals a patent left hemodialysis AV graft and outflow stents. A visible narrowing and elevated PSVs of 488 cm/s is noted just beyond the outflow stent. This finding is suggestive of a >75%focal hemodynamically significant   stenosis. Volume flow at mid graft level measures 503 ml/min.    IMP/PLAN:  55 y.o. female with HTN, HLD, CHF (EF 20%), COPD on home O2, paroxysmal Afib (on Coumadin), seizure disorder, ESRD on HD TThS via LUE AVG. Duplex today shows recurrent outflow stenosis and decreased flow volume, will require fistulagram with graft access.     1) LUE fistulagram next week (graft access)  2) continue coumadin, statin, asa    Torrey Villafana MD  Vascular & Endovascular Surgery

## 2019-07-24 NOTE — BRIEF OP NOTE
[facility]  Brief Operative Note     SUMMARY     Surgery Date: 7/24/2019     Surgeon(s) and Role:     * Torrey Villafana MD - Primary    Assisting Surgeon: None    Pre-op Diagnosis:  Arteriovenous graft stenosis, subsequent encounter [T82.858D]    Post-op Diagnosis:  Post-Op Diagnosis Codes:     * Arteriovenous graft stenosis, subsequent encounter [T82.858D]    Procedures:  1. PTA outflow vein stenosis, 10x20 dorado  2. PTA outflow axillary vein stenosis, 10x 40 dorado  3. Stent placed in outflow vein between 2 prior stents (8x50 viabahn), post dilated with 8x60 dorado  4. Conscious sedation 60 min    Anesthesia: RN IV sedation    Description of the findings of the procedure: 90% outflow vein stenosis, <10% residual after stent. 90% axillary vein stenosis, <10% after PTA. Good thrill noted afterward    Findings/Key Components: as above    Estimated Blood Loss: 5 cc         Specimens:   Specimen (12h ago, onward)    None          Discharge Note    SUMMARY     Admit Date: 7/24/2019    Discharge Date and Time:  07/24/2019 2:10 PM    Hospital Course (synopsis of major diagnoses, care, treatment, and services provided during the course of the hospital stay): discharged home after outpatient procedure     Final Diagnosis: Post-Op Diagnosis Codes:     * Arteriovenous fistula stenosis, subsequent encounter [T82.858D]    Disposition: Home or Self Care    Follow Up/Patient Instructions:     Medications:  Reconciled Home Medications:      Medication List      CHANGE how you take these medications    blood sugar diagnostic Strp  1 each by Misc.(Non-Drug; Combo Route) route once daily.  What changed:  when to take this     lancets Misc  1 each by Misc.(Non-Drug; Combo Route) route once daily.  What changed:  when to take this        CONTINUE taking these medications    atorvastatin 80 MG tablet  Commonly known as:  LIPITOR  TAKE 1 TABLET BY MOUTH EVERY EVENING     cinacalcet 30 MG Tab  Commonly known as:   SENSIPAR  Take 30 mg by mouth daily with breakfast.     clopidogrel 75 mg tablet  Commonly known as:  PLAVIX  TAKE 1 TABLET(75 MG) BY MOUTH EVERY DAY     fludrocortisone 0.1 mg Tab  Commonly known as:  FLORINEF  Take 2 tablets (200 mcg total) by mouth 2 (two) times daily.     levETIRAcetam 500 MG Tab  Commonly known as:  KEPPRA  TAKE 1 TABLET BY MOUTH TWICE DAILY     lisinopril 2.5 MG tablet  Commonly known as:  PRINIVIL,ZESTRIL  Take 1 tablet (2.5 mg total) by mouth once daily. Hold for systolic BP <100     LORazepam 1 MG tablet  Commonly known as:  ATIVAN  Take 0.5 tablets (0.5 mg total) by mouth every 8 (eight) hours as needed for Anxiety.     miconazole NITRATE 2 % 2 % top powder  Commonly known as:  MICOTIN  Apply topically 2 (two) times daily.     midodrine 10 MG tablet  Commonly known as:  PROAMATINE  Take 1 tablet (10 mg total) by mouth 3 (three) times daily.     nortriptyline 25 MG capsule  Commonly known as:  PAMELOR  Take 1 capsule by mouth nightly     omeprazole 20 MG capsule  Commonly known as:  PRILOSEC  TAKE 1 CAPSULE BY MOUTH EVERY DAY     ONE DAILY MULTIVITAMIN per tablet  Generic drug:  multivitamin  Take 1 tablet by mouth once daily.     patiromer calcium sorbitex 16.8 gram Pwpk  Commonly known as:  VELTASSA  Hold until follow-up with PCP     prednisoLONE acetate 1 % Drps  Commonly known as:  PRED FORTE  INSTILL ONE DROP INTO  LEFT EYE THREE TIMES A DAY     predniSONE 20 MG tablet  Commonly known as:  DELTASONE  Take 1 tablet (20 mg total) by mouth once daily.     PROLENSA 0.07 % Drop  Generic drug:  bromfenac  INSTILL 1 DROP IN LEFT / RIGHT EYE QD     DENISE-LINDA 0.8 mg Tab  Generic drug:  B complex-vitamin C-folic acid  TAKE 1 TABLET BY MOUTH ONCE DAILY     senna-docusate 8.6-50 mg 8.6-50 mg per tablet  Commonly known as:  PERICOLACE  Take 1 tablet by mouth 2 (two) times daily.     sevelamer carbonate 800 mg Tab  Commonly known as:  RENVELA  Take 1 tablet (800 mg total) by mouth daily with dinner  or evening meal.     tiZANidine 4 MG tablet  Commonly known as:  ZANAFLEX  Take 4 mg by mouth nightly as needed.     TRADJENTA 5 mg Tab tablet  Generic drug:  linaGLIPtin  TAKE 1 TABLET(5 MG) BY MOUTH EVERY DAY     warfarin 5 MG tablet  Commonly known as:  COUMADIN  Take 1 tablet (5mg)  by mouth every Tues, Thurs, Sat and 1/2 tablet (2.5mg) every Mon, Wed, Fri. And Sun.          Discharge Procedure Orders   Diet general     Remove dressing in 24 hours   Order Comments: OK to remove sutures on Saturday 7/27/19     Activity as tolerated     Follow-up Information     Torrey Villafana MD In 1 week.    Specialty:  Vascular Surgery  Why:  with AVG duplex  Contact information:  [address]  [facility] [address] [phone]

## 2019-07-24 NOTE — PLAN OF CARE
Problem: Adult Inpatient Plan of Care  Goal: Plan of Care Review  Outcome: Ongoing (interventions implemented as appropriate)  Received report from cath lab, RN. Patient s/p fistulagram, AAOx3. VSS, no c/o pain or discomfort at this time, resp even and unlabored. Gauze/tegaderm dressing to left upper arm is CDI. No active bleeding. No hematoma noted. Post procedure protocol reviewed with patient and patient's family. Understanding verbalized. Family members at bedside. Nurse call bell within reach. Will continue to monitor per post procedure protocol.

## 2019-07-24 NOTE — INTERVAL H&P NOTE
The patient has been examined and the H&P has been reviewed:    I concur with the findings and no changes have occurred since H&P was written.    Anesthesia/Surgery risks, benefits and alternative options discussed and understood by patient/family.          Active Hospital Problems    Diagnosis  POA    Arteriovenous fistula stenosis [H56.715D]  Yes      Resolved Hospital Problems   No resolved problems to display.

## 2019-07-24 NOTE — Clinical Note
Angiography performed post intervention of the proximal LEFT AV FISTULA. Angiography performed via hand injection with .

## 2019-07-24 NOTE — NURSING
Patient discharged per MD orders. Instructions given on medications, wound care, activity, signs of infection, when to call MD, and follow up appointments. Pt verbalized understanding.  Patient AAOx3, VSS, no c/o pain or discomfort at this time. Telemetry and PIV removed, catheter tip intact. Patient awaiting transport for discharge

## 2019-07-26 NOTE — ED TRIAGE NOTES
Sisi Medel, a 58 y.o. female presents to the ED w/ complaint of left leg pain, back pain and headache. Pt states that around 1600 today she was being transported in wheelchair van and fell forward out of chair onto left leg. Pt denies head injury/LOC. Pt states that she went back to [facility] but was in too much pain.     Triage note:  Chief Complaint   Patient presents with    Fall     Pt arrives via EMS w/ c/o h/a from stress after fall from wheelchair in transport van due to unsecure seatbelt on the way back to [facility] from dialysis.No obvious signs of injury, -LOC/head injury     Review of patient's allergies indicates:   Allergen Reactions    Bactrim [sulfamethoxazole-trimethoprim] Other (See Comments)     Causes renal failure    Nsaids (non-steroidal anti-inflammatory drug) Other (See Comments)     Renal failure     Past Medical History:   Diagnosis Date    Anemia in ESRD (end-stage renal disease) 3/7/2016    Anticoagulant long-term use     Cervical radiculopathy 7/20/2015    CHF (congestive heart failure)     Chronic combined systolic and diastolic heart failure 6/14/2013    EF 20% 2013, improved with Medical therapy, negative PET 2013    Chronic respiratory failure with hypoxia 04/22/2013    On home oxygen at 2-5 liters    Closed fracture of proximal end of right fibula 6/27/2016    Complications due to renal dialysis device, implant, and graft     DDD (degenerative disc disease), lumbar 6/17/2015    Dependence on renal dialysis     Diabetes mellitus type II, controlled 12/8/2017    ESRD on hemodialysis 2/23/2016    Essential hypertension     Gout     Lateral meniscal tear 5/31/2016    Lumbar stenosis 6/17/2015    Obesity, Class III, BMI 40-49.9 (morbid obesity)     Pacemaker     Paroxysmal atrial fibrillation 5/16/2014    Not on anticoagulation    Peripheral neuropathy 11/13/2013    Personal history of gastric ulcer 3/19/2013    Pneumonia of left lower lobe due to  infectious organism 3/10/2019    Sarcoidosis, lung 2009    Diagnosed in 2009 with ocular manifestation, on 4L home O2 and PO steroids    Secondary pulmonary hypertension 4/7/2015    Seizure disorder 3/19/2013    Shingles 11/13/2013    Thyroid disease

## 2019-07-26 NOTE — PROVIDER PROGRESS NOTES - EMERGENCY DEPT.
Signed out to me pending INR in CT scan.  CT scan shows no acute findings, no brain injury. INR is subtherapeutic slightly.  Patient will be discharged home.  Advised pt to follow up with PCP or return if concerning symptoms arise. Pt understands and agrees with plan. Will d/c home.

## 2019-07-26 NOTE — ED PROVIDER NOTES
Encounter Date: 7/25/2019    SCRIBE #1 NOTE: I, Jolie Hernandez, am scribing for, and in the presence of,  Dr. Candelario. I have scribed the entire note.       History     Chief Complaint   Patient presents with    Fall     Pt arrives via EMS w/ c/o h/a from stress after fall from wheelchair in transport van due to unsecure seatbelt on the way back to [facility] from dialysis.No obvious signs of injury, -LOC/head injury     58 y.o. Female presenting in the ED with complaints of leg and back pain due to a fall. Patient states that around 5PM while leaving dialysis, she fell out of her wheel chair while being moved into the van. Patient states that she fell onto her left leg and hip. Patient states that her pain worsened from having to sit in the us while waiting to be examined. Patient states that she has a throbbing pain in her left knee, left hip, buttocks, and lower back. Patient also endorses a headache from having to sit in the heat. Patient denies head injury or loss of consciousness. Patient takes Coumadin and plavix. No n/v, chest pain, SOB, abdo pain.     The history is provided by the patient and medical records.     Review of patient's allergies indicates:   Allergen Reactions    Bactrim [sulfamethoxazole-trimethoprim] Other (See Comments)     Causes renal failure    Nsaids (non-steroidal anti-inflammatory drug) Other (See Comments)     Renal failure     Past Medical History:   Diagnosis Date    Anemia in ESRD (end-stage renal disease) 3/7/2016    Anticoagulant long-term use     Cervical radiculopathy 7/20/2015    CHF (congestive heart failure)     Chronic combined systolic and diastolic heart failure 6/14/2013    EF 20% 2013, improved with Medical therapy, negative PET 2013    Chronic respiratory failure with hypoxia 04/22/2013    On home oxygen at 2-5 liters    Closed fracture of proximal end of right fibula 6/27/2016    Complications due to renal dialysis device, implant, and graft     DDD  (degenerative disc disease), lumbar 2015    Dependence on renal dialysis     Diabetes mellitus type II, controlled 2017    ESRD on hemodialysis 2016    Essential hypertension     Gout     Lateral meniscal tear 2016    Lumbar stenosis 2015    Obesity, Class III, BMI 40-49.9 (morbid obesity)     Pacemaker     Paroxysmal atrial fibrillation 2014    Not on anticoagulation    Peripheral neuropathy 2013    Personal history of gastric ulcer 3/19/2013    Pneumonia of left lower lobe due to infectious organism 3/10/2019    Sarcoidosis, lung 2009    Diagnosed in  with ocular manifestation, on 4L home O2 and PO steroids    Secondary pulmonary hypertension 2015    Seizure disorder 3/19/2013    Shingles 2013    Thyroid disease      Past Surgical History:   Procedure Laterality Date    ABDOMINAL SURGERY      ABLATION N/A 2017    Performed by [facility] at [facility] CATH LAB    ANGIOPLASTY Left 1/10/2018    Performed by Torrey Villafana MD at [facility] OR 2ND FLR    ANGIOPLASTY-PERCUTANEOUS TRANSLUMINAL (PTA) Left 2017    Performed by Tor[facility]ana MD at [facility] OR 2ND FLR    ANGIOPLASTY-PERCUTANEOUS TRANSLUMINAL (PTA) Left 10/12/2016    Performed by [facility]rey [facility]ana MD at [facility] OR 2ND FLR    CARDIAC PACEMAKER PLACEMENT       SECTION      x2    DECLOT GRAFT PERCUTANEOUS Left 2017    Performed by Torrey [facility]afana MD at [facility] OR 2ND FLR    DECLOT-GRAFT Left 2016    Performed by Harjit Starr MD at [facility] CATH LAB    DECLOT-GRAFT Left 2016    Performed by Harjit Starr MD at [facility] CATH LAB    ECHOCARDIOGRAM-TRANSESOPHAGEAL N/A 2013    Performed by Delmy Surgeon at [facility] DELMY    Fistulogram Left 2019    Performed by [facility]ana MD at [facility] CATH LAB    Fistulogram Left 2019    Performed by Torrey Villafana MD at [facility] CATH LAB    fistulogram Left 1/10/2018    Performed by Torrey LOCKETT  MD Broderick at [facility] OR 2ND FLR    FISTULOGRAM Left 9/13/2017    Performed by Torrey Villafana MD at [facility] CATH LAB    FISTULOGRAM Left 10/12/2016    Performed by Torrey Villafana MD at [facility] OR 2ND FLR    FISTULOGRAM Left 6/21/2016    Performed by Harjit Starr MD at [facility] CATH LAB    Fistulogram, LUE AVG, possible intervention Left 1/30/2019    Performed by Torrey Villafana MD at [facility] CATH LAB    Fistulogram, OR 11 Left 5/8/2019    Performed by Torrey Villafana MD at [facility] OR 2ND FLR    INJECTION-STEROID-EPIDURAL-TRANSFORAMINAL Right 7/20/2015    Performed by Patria Gutierrez MD at [facility] [place] PAIN MGT    INJECTION-STEROID-EPIDURAL-TRANSFORAMINAL Right 5/21/2015    Performed by Patria Gutierrez MD at [facility] [place] PAIN MGT    PMXOBAOOV-TFDRF-[id number] / Left upper AV graft. Left 2/3/2016    Performed by Torrey Villafana MD at [facility] OR 2ND FLR    GFQSBNNTW-LMRVMLKJI-VBSSLWOALQWVL N/A 1/11/2017    Performed by Bladimir Adorno MD at [facility] CATH LAB    OTHER SURGICAL HISTORY      loop recorder implant    PLACEMENT-STENT Left 2/7/2017    Performed by Torrey Villafana MD at [facility] OR 2ND FLR    PTA, AV FISTULA N/A 7/24/2019    Performed by Torrey Villafana MD at [facility] CATH LAB    PTA, AV FISTULA N/A 1/30/2019    Performed by Torrey Villafana MD at [facility] CATH LAB    stent to fistula      Stent, Fistula  7/24/2019    Performed by Torrey Villafana MD at [facility] CATH LAB    TRANSESOPHAGEAL ECHOCARDIOGRAM (JARAD) N/A 6/27/2016    Performed by Sourav Machuca MD at [facility] CATH LAB    ULTRASOUND, UPPER GI TRACT, ENDOSCOPIC N/A 1/9/2014    Performed by Stewart Burgess MD at [facility] ENDO (2ND FLR)    VASCULAR SURGERY      fistula to L upper arm      Family History   Problem Relation Age of Onset    Kidney failure Mother     Hypertension Mother     Diabetes Mother     Coronary artery disease Father     Hypertension Father     Diabetes Father     Lupus Sister     Diabetes Maternal Grandmother      Colon cancer Neg Hx     Ovarian cancer Neg Hx     Breast cancer Neg Hx      Social History     Tobacco Use    Smoking status: Former Smoker     Packs/day: 0.50     Years: 10.00     Pack years: 5.00     Types: Cigarettes     Last attempt to quit: 1994     Years since quittin.2    Smokeless tobacco: Never Used   Substance Use Topics    Alcohol use: No     Alcohol/week: 0.0 oz     Comment: rarely    Drug use: No     Review of Systems   Constitutional:  No Fever, No Chills,   Eyes: No Vision Changes  ENT/Mouth: No sore throat, No rhinorrhea  Cardiovascular:  No Chest Pain, No Palpitations  Respiratory:  No Cough, No SOB  Gastrointestinal:  No Nausea, No Vomiting, No Diarrhea, No abdo pain.  Genitourinary:  No  pain, No dysuria   Musculoskeletal: Lower back pain. Left knee pain. Buttock pain, No Neck Pain, No recent trauma.  Skin:  No skin Lesions  Neuro:  Headache. No Weakness, No Numbness, No Paresthesias, No Dizziness,         Physical Exam     Initial Vitals [19]   BP Pulse Resp Temp SpO2   110/70 78 18 98 °F (36.7 °C) 95 %      MAP       --         Physical Exam    Nursing note and vitals reviewed.    Physical Exam:  GENERAL APPEARANCE: Well developed, well nourished, in no acute distress.  HENT: Normocephalic, atraumatic    EYES: Sclerae anicteric, Pupile are equal and round.  NECK: Supple, no thyroid enlargement  CARDIOVASCULAR: Regular rate and rhythm without any murmurs, gallops, rubs.  LUNGS: Speaking in full sentences. Breathing comfortably. Auscultation of the lungs revealed normal breath sounds b/l  ABDOMEN: Soft and nontender, no masses, no rebound or guarding   NEUROLOGIC: Alert, interacting normally. No facial droop.   MSK: NO C/T spine tenderness. Mild Right lower back pain. Mild discomfort to palpitation to Left knee. No bony tenderness to hip, femur, tib/fib, or ankle. Moving all four extremities.  Skin: Warm and dry. No visible rash on exposed areas of skin.    Psych: Mood  and affect normal.       ED Course   Procedures  Labs Reviewed   CBC W/ AUTO DIFFERENTIAL   COMPREHENSIVE METABOLIC PANEL   PROTIME-INR          Imaging Results          CT Head Without Contrast (No Result on File)                X-Ray Knee 3 View Left (Final result)  Result time 07/25/19 23:02:52    Final result by Toño John MD (07/25/19 23:02:52)                 Impression:      No acute displaced fracture identified.  Small suprapatellar joint effusion.    Remote fracture of the proximal fibula, similar to prior study.      Electronically signed by: Toño John MD  Date:    07/25/2019  Time:    23:02             Narrative:    EXAMINATION:  XR KNEE 3 VIEW LEFT    CLINICAL HISTORY:  Unspecified fall, initial encounter    TECHNIQUE:  AP, lateral, and Merchant views of the left knee were performed.    COMPARISON:  07/03/2019.    FINDINGS:  Suboptimal positioning limits evaluation.  Bones are demineralized.  No evidence of displaced fracture or dislocation.  Joint spaces are relatively well preserved.  There is an old fracture of the left proximal fibula with large surrounding callus similar to the prior study.  Suspect small joint effusion.  Soft tissues are prominent but symmetric.                               X-Ray Tibia Fibula 2 View Left (Final result)  Result time 07/25/19 23:06:29    Final result by Toño John MD (07/25/19 23:06:29)                 Impression:      Limited examination as discussed above.  No acute displaced fracture identified.    Remote proximal fibular fracture.      Electronically signed by: Toño John MD  Date:    07/25/2019  Time:    23:06             Narrative:    EXAMINATION:  XR TIBIA FIBULA 2 VIEW LEFT    CLINICAL HISTORY:  Unspecified fall, initial encounter    TECHNIQUE:  AP and lateral views of the left tibia and fibula were performed.    COMPARISON:  07/03/2019.    FINDINGS:  Bones are demineralized.  Evaluation is limited by suboptimal cross-table  lateral positioning and overlapping radiopaque material over the lower tibia/fibula.  The lower fibula is specifically not well evaluated on the lateral view.  No significantly displaced acute fracture is identified allowing for these limitations.  There is a remote fracture involving the proximal fibular shaft with similar alignment to the prior study.  Alignment at the ankle and knee are preserved.  There is moderate soft tissue prominence about the ankle, slightly more pronounced over the lateral malleolus when compared with the prior exam.  Dedicated views of the ankle could be performed if clinically warranted.                                 Medical Decision Making:   History:   Old Medical Records: I decided to obtain old medical records.  Initial Assessment:   Fall from wheel chair while being driven from dialysis. No head trauma, no loc. Pt mainly c/o L knee pain, also has a mild headache. Patient is on coumadin. Will obtain CT brain to rule out intracranial bleed despite no direct head trauma, given HA and on coumadin + Plavix. Will obtain basic labs and INR. XR knee and tib-fib If labs are unremarkable, discharge home.   ED Management:  Update:   Pain improved with ED treatments.   XR neg for new fx.   S/o to oncoming attending pending labs results and CT brain.   If those are not remarkable, anticipate d/c home with outpatient follow up and return precautions.   She is HD stable.   BP noted, she was sleeping at time it was taken. No dizziness, weakness, new pain when questions.     MDM Complexity Points:   Problem Points:  1.New problem, with no additional ED work-up planned (maximum of 1) - fall, dialysis patient on AC.     Data Points:  Review or order clinical lab tests, Review or order radiology test and Decision to obtain old records (in the EHR)            Scribe Attestation:   Scribe #1: I performed the above scribed service and the documentation accurately describes the services I performed. I  attest to the accuracy of the note.               Clinical Impression:       ICD-10-CM ICD-9-CM   1. Fall W19.XXXA E888.9   2. Fall W19.XXXA E888.9                                Dong Candelario MD  07/27/19 [id number]

## 2019-07-26 NOTE — OP NOTE
Date: 07/24/2019    Surgeon(s) and Role:   Torrey Villafana MD - Primary   Monisha Jacome MD - Assisting     Pre-op Diagnosis: Arteriovenous graft stenosis, subsequent encounter [T82.518D]    Post-op Diagnosis: Same     Procedure(s):   1. PTA outflow vein stenosis, 10x20 dorado  2. PTA outflow axillary vein stenosis, 10x 40 dorado  3. Stent placed in outflow vein between 2 prior stents (8x50 viabahn), post dilated with 8x60 dorado  4. Conscious sedation 60 min    Anesthesia: RN IV sedation    Findings/Key Components:   90% outflow vein stenosis, <10% residual after stent. 90% axillary vein stenosis, <10% after PTA. Good thrill noted afterward    EBL: Minimal    PROCEDURE IN DETAIL:  The patient was brought to the Cath Lab, placed in supine. L arm was prepped and draped in the standard surgical fashion. Under ultrasound guidance, the proximal aspect of the AVG was accessed with a micropuncture needle; ultrasound confirmed vessel patency, followed by placement of 4/3-Citizen [place] micropuncture dilator. Fistulogram was performed and revealed 90% stenosis in the outflow vein between 2 previously placed stents, as well as a 90% stenosis in the axillary vein. Through this, an 0.035-inch wire was placed in the short 6-Citizen [place] sheath and advanced into the SVC.  The outflow vein was treated with a 10x20 dorado balloon (6 acacia 2 min). The axillary vein stenosis was treated with a 10x40 dorado balloon (18 acacia 2 min).  Completion fistulogram showed significant improvement in both.  However, given that this was a recurrence of an [place] in the outflow vein, decision was made to place a stent. The sheath was up sized to 8 Citizen [place], and a 8 x 50 viabahn stent was positioned between the 2 previously placed outflow vein stents.  It was then deployed, and postdilated with an 8 x 60 dorado balloon.  Completion fistulogram showed less than 10% residual stenosis in both the axillary vein and the outflow vein. Strong thrill could be felt. The sheath  was removed, 3-0 nylon U-stitch was placed with good hemostasis.    MODERATE SEDATION IN CATH LAB   Torrey Villafana MD was present for moderate sedation  Torrey Villafana MD monitored the patients cardio respiratory functions during the moderate sedation   See nurses notes for Intra-service start and end times and for the log of the name of the RN who administered the medicines for the moderate sedation, including their doseage and route.    Time of sedation:  60 mins.    Monisha Jacome MD   Fellow, Vascular/Endovascular Surgery
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
## 2019-07-30 NOTE — TELEPHONE ENCOUNTER
Sw the Pt about the last CTHEAD scan I informed the patient that  reviewed the Ct and he has stated that SDH has resolved and she did not need to come to a rescheduled F/U
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
## 2019-08-03 PROBLEM — J96.21 ACUTE ON CHRONIC RESPIRATORY FAILURE WITH HYPOXIA: Status: ACTIVE | Noted: 2019-01-01

## 2019-08-03 PROBLEM — R06.02 SHORTNESS OF BREATH: Status: ACTIVE | Noted: 2019-01-01

## 2019-08-03 PROBLEM — Z79.01 CURRENT USE OF LONG TERM ANTICOAGULATION: Status: ACTIVE | Noted: 2019-01-01

## 2019-08-03 PROBLEM — I25.10 CAD (CORONARY ARTERY DISEASE): Status: ACTIVE | Noted: 2019-01-01

## 2019-08-03 PROBLEM — G47.33 OBSTRUCTIVE SLEEP APNEA: Status: ACTIVE | Noted: 2019-01-01

## 2019-08-03 NOTE — ED NOTES
Patient resting in bed. Continuous pulse oximetry, BP, and cardiac monitoring in place. Call bell within reach. Will continue to monitor.

## 2019-08-03 NOTE — ASSESSMENT & PLAN NOTE
Patient has hx of HF (EF 35%). CXR in the ED suggestive of HF. BNP 1424 and troponins 0.156. Patient hypotensive in the ED. Patient is unsure if she takes her lisinopril at home.    Plan  - trend troponins  - monitor BP  - hold HF meds in setting of hypotension.

## 2019-08-03 NOTE — H&P
[facility] Medicine  History & Physical    Patient Name: Sisi Medel  MRN: [id number]  Admission Date: 8/3/2019  Attending Physician: Radha Lockett MD   Primary Care Provider: Carlos A Caputo MD    [facility] Medicine Team: Networked reference to record PCT  Yari Almonte MD     Patient information was obtained from patient and ER records.     Subjective:     Principal Problem:Acute on chronic respiratory failure with hypoxia    Chief Complaint:   Chief Complaint   Patient presents with    Shortness of Breath     Pt c/o SOB after dialysis treatment today. Pt reports productive cough and anxiety. Pt is on 2 lpm of home O2 at all times.         HPI: Patient is a 59 yo f with ESRD on HD, cervical radiculopathy, CHF, Chronic resp failure with hypoxia, T2DM, DDD, paroxysmal Afib, seizure, shingles, thyroid disorder here for a 3 day history of cough and SOB. Patient was at dialysis and had to stop at 1.6L due to resp distress. She was put in a nonrebreather at 12L of oxygen. She was transported via EMS.    The cough has been getting progressively worse. It is a wet cough where she will sometimes cough up a little yellow mucous. She has no fevers, chills, night sweats, and has not been exposed to anyone sick or traveled recently. She has noticed increased fatigue and a bit of chest pain when she coughs. The cough has gotten so bad that she now has chronic SOB the past 24 hours. She cannot ambulate as she gets too short of breath. It will wake her from her sleep and she must sit up for a few minutes for the cough to stop. She has not taken any medications to make it better. Nothing except for exertion makes it worse.        ED: patient given midodrine 10, CXR, ativan for anxiety, and continuous oxygen 5L via NC.     Past Medical History:   Diagnosis Date    Anemia in ESRD (end-stage renal disease) 3/7/2016    Anticoagulant long-term use     Cervical radiculopathy 7/20/2015     CHF (congestive heart failure)     Chronic combined systolic and diastolic heart failure 2013    EF 20% , improved with Medical therapy, negative PET     Chronic respiratory failure with hypoxia 2013    On home oxygen at 2-5 liters    Closed fracture of proximal end of right fibula 2016    Complications due to renal dialysis device, implant, and graft     DDD (degenerative disc disease), lumbar 2015    Dependence on renal dialysis     Diabetes mellitus type II, controlled 2017    ESRD on hemodialysis 2016    Essential hypertension     Gout     Lateral meniscal tear 2016    Lumbar stenosis 2015    Obesity, Class III, BMI 40-49.9 (morbid obesity)     Pacemaker     Paroxysmal atrial fibrillation 2014    Not on anticoagulation    Peripheral neuropathy 2013    Personal history of gastric ulcer 3/19/2013    Pneumonia of left lower lobe due to infectious organism 3/10/2019    Sarcoidosis, lung 2009    Diagnosed in  with ocular manifestation, on 4L home O2 and PO steroids    Secondary pulmonary hypertension 2015    Seizure disorder 3/19/2013    Shingles 2013    Thyroid disease        Past Surgical History:   Procedure Laterality Date    ABDOMINAL SURGERY      ABLATION N/A 2017    Performed by [facility]imir [facility]orno [facility] at [facility] CATH LAB    ANGIOPLASTY Left 1/10/2018    Performed by Torrey Villafana MD at [facility] OR 2ND FLR    ANGIOPLASTY-PERCUTANEOUS TRANSLUMINAL (PTA) Left 2017    Performed by Torrey [facility]afana MD at [facility] OR 2ND FLR    ANGIOPLASTY-PERCUTANEOUS TRANSLUMINAL (PTA) Left 10/12/2016    Performed by [facility]rey [facility]afana MD at [facility] OR 2ND FLR    CARDIAC PACEMAKER PLACEMENT       SECTION      x2    DECLOT GRAFT PERCUTANEOUS Left 2017    Performed by Tor[facility]afana MD at [facility] OR 2ND FLR    DECLOT-GRAFT Left 2016    Performed by Harjit Starr MD at [facility] CATH LAB    DECLOT-GRAFT  Left 6/20/2016    [facility] at [facility] CATH LAB    ECHOCARDIOGRAM-TRANSESOPHAGEAL N/A 6/27/2013    Performed by Delmy Surgeon at [facility] DELMY    Fistulogram Left 7/24/2019    Performed by [facility]ana MD at [facility] CATH LAB    Fistulogram Left 4/8/2019    Performed by Tor[facility]ana MD at [facility] CATH LAB    fistulogram Left 1/10/2018    Performed by [facility] at [facility] OR 2ND FLR    FISTULOGRAM Left 9/13/2017    Performed by [facility] at [facility] CATH LAB    FISTULOGRAM Left 10/12/2016    Performed by [facility] at [facility] OR 2ND FLR    FISTULOGRAM Left 6/21/2016    Performed by Harjit Starr MD at [facility] CATH LAB    Fistulogram, LUE AVG, possible intervention Left 1/30/2019    Performed by [facility] Villafana MD at [facility] CATH LAB    Fistulogram, OR 11 Left 5/8/2019    Performed by [facility] at [facility] OR 2ND FLR    INJECTION-STEROID-EPIDURAL-TRANSFORAMINAL Right 7/20/2015    Performed by [facility]rez [facility] at [facility] PAIN MGT    INJECTION-STEROID-EPIDURAL-TRANSFORAMINAL Right 5/21/2015    Performed by [facility]rez MD at [facility] MGT    UWNXIFIUI-DXDWN-EJKVGCVOIBLTC / Left upper AV graft. Left 2/3/2016    [facility]formed [facility] at [facility] OR 2ND FLR    UYSJZGQKR-VPZXQMFDF-SMPNANNOKTFOW N/A 1/11/2017    Performed by Bladimir Adorno MD at [facility] CATH LAB    OTHER SURGICAL HISTORY      loop recorder implant    PLACEMENT-STENT Left 2/7/2017    Performed by [facility] at [facility] OR 2ND FLR    PTA, AV FISTULA N/A 7/24/2019    Performed by [facility]afana [facility] at [facility] CATH LAB    PTA, AV FISTULA N/A 1/30/2019    Performed by [facility]afana MD at [facility] CATH LAB    stent to fistula      Stent, Fistula  7/24/2019    Performed by [facility]af[facility] MD at [facility] CATH LAB    TRANSESOPHAGEAL ECHOCARDIOGRAM (JARAD) N/A 6/27/2016    Performed by Sourav Machuca MD at [facility] CATH LAB    ULTRASOUND, UPPER GI TRACT,  ENDOSCOPIC N/A 1/9/2014    Performed by [facility]ss [facility] at [facility] ENDO (2ND FLR)    VASCULAR SURGERY      fistula to L upper arm        Review of patient's allergies indicates:   Allergen Reactions    Bactrim [sulfamethoxazole-trimethoprim] Other (See Comments)     Causes renal failure    Nsaids (non-steroidal anti-inflammatory drug) Other (See Comments)     Renal failure       Current Facility-Administered Medications on File Prior to Encounter   Medication    ceFAZolin injection 2 g    lidocaine (PF) 10 mg/ml (1%) injection 10 mg    mupirocin 2 % ointment    sodium chloride 0.9% flush 10 mL     Current Outpatient Medications on File Prior to Encounter   Medication Sig    midodrine (PROAMATINE) 10 MG tablet Take 1 tablet (10 mg total) by mouth 3 (three) times daily.    atorvastatin (LIPITOR) 80 MG tablet TAKE 1 TABLET BY MOUTH EVERY EVENING    blood sugar diagnostic Strp 1 each by Misc.(Non-Drug; Combo Route) route once daily. (Patient taking differently: 1 each by Misc.(Non-Drug; Combo Route) route 2 (two) times daily. )    cinacalcet (SENSIPAR) 30 MG Tab Take 30 mg by mouth daily with breakfast.    clopidogrel (PLAVIX) 75 mg tablet TAKE 1 TABLET(75 MG) BY MOUTH EVERY DAY    fludrocortisone (FLORINEF) 0.1 mg Tab Take 2 tablets (200 mcg total) by mouth 2 (two) times daily.    lancets Misc 1 each by Misc.(Non-Drug; Combo Route) route once daily. (Patient taking differently: 1 each by Misc.(Non-Drug; Combo Route) route 2 (two) times daily. )    levETIRAcetam (KEPPRA) 500 MG Tab TAKE 1 TABLET BY MOUTH TWICE DAILY    lisinopril (PRINIVIL,ZESTRIL) 2.5 MG tablet Take 1 tablet (2.5 mg total) by mouth once daily. Hold for systolic BP <100    LORazepam (ATIVAN) 1 MG tablet Take 0.5 tablets (0.5 mg total) by mouth every 8 (eight) hours as needed for Anxiety.    miconazole NITRATE 2 % (MICOTIN) 2 % top powder Apply topically 2 (two) times daily.    multivitamin (ONE DAILY MULTIVITAMIN) per tablet Take 1  tablet by mouth once daily.    nortriptyline (PAMELOR) 25 MG capsule Take 1 capsule by mouth nightly    omeprazole (PRILOSEC) 20 MG capsule TAKE 1 CAPSULE BY MOUTH EVERY DAY    oxyCODONE-acetaminophen (PERCOCET) 5-325 mg per tablet Take 1 tablet by mouth 3 (three) times daily as needed (pain >4 after acetaminophen given).    patiromer calcium sorbitex (VELTASSA) 16.8 gram PwPk Hold until follow-up with PCP    prednisoLONE acetate (PRED FORTE) 1 % DrpS INSTILL ONE DROP INTO  LEFT EYE THREE TIMES A DAY    predniSONE (DELTASONE) 20 MG tablet Take 1 tablet (20 mg total) by mouth once daily.    PROLENSA 0.07 % Drop INSTILL 1 DROP IN LEFT / RIGHT EYE QD    DENISE-LINDA 0.8 mg Tab TAKE 1 TABLET BY MOUTH ONCE DAILY    senna-docusate 8.6-50 mg (PERICOLACE) 8.6-50 mg per tablet Take 1 tablet by mouth 2 (two) times daily.    sevelamer carbonate (RENVELA) 800 mg Tab Take 1 tablet (800 mg total) by mouth daily with dinner or evening meal.    tiZANidine (ZANAFLEX) 4 MG tablet Take 4 mg by mouth nightly as needed.    TRADJENTA 5 mg Tab tablet TAKE 1 TABLET(5 MG) BY MOUTH EVERY DAY    warfarin (COUMADIN) 5 MG tablet Take 1 tablet (5mg)  by mouth every Tues, Thurs, Sat and 1/2 tablet (2.5mg) every Mon, Wed, Fri. And Sun.     Family History     Problem Relation (Age of Onset)    Coronary artery disease Father    Diabetes Mother, Father, Maternal Grandmother    Hypertension Mother, Father    Kidney failure Mother    Lupus Sister        Tobacco Use    Smoking status: Former Smoker     Packs/day: 0.50     Years: 10.00     Pack years: 5.00     Types: Cigarettes     Last attempt to quit: 1994     Years since quittin.2    Smokeless tobacco: Never Used   Substance and Sexual Activity    Alcohol use: No     Alcohol/week: 0.0 oz     Comment: rarely    Drug use: No    Sexual activity: Not Currently     Review of Systems   Constitutional: Positive for chills and fatigue. Negative for fever.   HENT: Positive for sore  throat. Negative for congestion.    Eyes: Positive for pain. Negative for visual disturbance.   Respiratory: Positive for cough and shortness of breath.    Cardiovascular: Positive for chest pain and palpitations.   Gastrointestinal: Positive for nausea. Negative for abdominal pain, constipation, diarrhea and vomiting.   Genitourinary:        Patient does not urinate.    Musculoskeletal: Negative for back pain and myalgias.   Skin: Negative for color change and pallor.   Neurological: Positive for dizziness and headaches.   Psychiatric/Behavioral: Negative for confusion and decreased concentration.     Objective:     Vital Signs (Most Recent):  Temp: 97.9 °F (36.6 °C) (08/03/19 1338)  Pulse: 70 (08/03/19 1725)  Resp: (!) 24 (08/03/19 1338)  BP: (!) 96/51 (08/03/19 1725)  SpO2: 96 % (08/03/19 1653) Vital Signs (24h Range):  Temp:  [97.5 °F (36.4 °C)-97.9 °F (36.6 °C)] 97.9 °F (36.6 °C)  Pulse:  [66-78] 70  Resp:  [24-28] 24  SpO2:  [90 %-100 %] 96 %  BP: ()/(51-74) 96/51     Weight: 108.9 kg (240 lb)  Body mass index is 38.74 kg/m².    Physical Exam   Constitutional: She is oriented to person, place, and time. She appears well-developed and well-nourished.   Eyes: Pupils are equal, round, and reactive to light. Conjunctivae and EOM are normal.   Neck: Normal range of motion. Neck supple.   Cardiovascular: Normal rate, regular rhythm and normal heart sounds.   Pulmonary/Chest: She is in respiratory distress. She has rales.   Crackles throughout all lung bases.    Abdominal: Soft. Bowel sounds are normal. She exhibits no distension. There is no tenderness.   Musculoskeletal: She exhibits edema.   Pitting edema bilat to shins   Neurological: She is alert and oriented to person, place, and time.   Skin: Skin is warm and dry.   Psychiatric: She has a normal mood and affect. Her behavior is normal. Thought content normal.   Vitals reviewed.        CRANIAL NERVES     CN III, IV, VI   Pupils are equal, round, and  reactive to light.  Extraocular motions are normal.        Significant Labs:   ABGs: No results for input(s): PH, PCO2, HCO3, POCSATURATED, BE, TOTALHB, COHB, METHB, O2HB, POCFIO2 in the last 48 hours.  CBC:   Recent Labs   Lab 08/03/19  1252   WBC 7.14   HGB 10.4*   HCT 36.0*        CMP:   Recent Labs   Lab 08/03/19  1252   *   K 3.9   CL 87*   CO2 27   GLU 95   BUN 13   CREATININE 2.4*   CALCIUM 8.8   PROT 7.4   ALBUMIN 2.8*   BILITOT 1.0   ALKPHOS 124   AST 45*   ALT 24   ANIONGAP 18*   EGFRNONAA 21.8*     Cardiac Markers:   Recent Labs   Lab 08/03/19  1252   BNP 1,464*     Coagulation:   Recent Labs   Lab 08/03/19  1252   INR 1.3*       Significant Imaging:     CXR: Cardiomegaly with central pulmonary vascular edema, consistent with congestive heart failure.    EKG: A fib with LVH with QRS widening    Assessment/Plan:     * Acute on chronic respiratory failure with hypoxia  Patient has a hx of ESRD on HD and CHF. This is likely fluid overload due to a combination of these diagnoses. BNP 1464 and troponins 0.156.    Plan  - consulted nephro for HD  - duo nebs for symptomatic tx  - trend troponins      ESRD on hemodialysis  Patient has a history of ESRD on HD 3x a week.      Plan  - consulted nephro about HD.  - home meds cinacalcet and sevelamer continued.       CAD (coronary artery disease)  Patient is on clopidogrel and atorvastatin at home.    Plan  - continue clopidogrel    Current use of long term anticoagulation  Patient on warfarin for A fib.    Plan  - continue warfarin.       Obstructive sleep apnea  On BiPAP.      Class 2 severe obesity due to excess calories with serious comorbidity and body mass index (BMI) of 38.0 to 38.9 in adult        Type 2 diabetes mellitus, without long-term current use of insulin  Patient is on insulin at home. Glu is 95 on admission.    Plan:   - insulin aspart sliding scale.       Permanent atrial fibrillation  Patient presents with A fib in the ED. Patient is  on chronic warfarin. S/P ablation multiple times.    Plan  - continue warfarin.  - pacemaker in place.       GERD (gastroesophageal reflux disease)  Patient on PPI at home.    Plan  - continue with pantoprazole      Hypoalbuminemia  See ESRD assessment       Current chronic use of systemic steroids  Continue home steroids  - prednisone  - prednisolone eye drops  - fludrocortisone      Acute on chronic systolic congestive heart failure  Patient has hx of HF (EF 35%). CXR in the ED suggestive of HF. BNP 1424 and troponins 0.156. Patient hypotensive in the ED. Patient is unsure if she takes her lisinopril at home.    Plan  - trend troponins  - monitor BP  - hold HF meds in setting of hypotension.         Hyperlipidemia  Patient on atorvastatin at home.    Plan  - hold      Pulmonary hypertension  See CHF assessment.    Epilepsy  Continue keppra    Hypotension  Patient on 10 mg midodrine TID at home    Plan  - continue midodrine      Pulmonary sarcoidosis  Continue with prednisone 20 mg        VTE Risk Mitigation (From admission, onward)        Ordered     warfarin (COUMADIN) tablet 2.5 mg  Daily      08/03/19 4011             Yari Almonte MD  Department of Hospital Medicine   [facility]

## 2019-08-03 NOTE — HPI
Patient is a 57 yo f with ESRD on HD, cervical radiculopathy, CHF, Chronic resp failure with hypoxia, T2DM, DDD, paroxysmal Afib, seizure, shingles, thyroid disorder here for a 3 day history of cough. It has been getting progressively worse. It is a wet cough where she will sometimes cough up a little yellow mucous. She has no fevers, chills, night sweats, and has not been exposed to anyone sick or traveled recently.     With the cough she has noticed increased fatigue and a bit of chest pain when she coughs. The cough has gotten so bad that she now has chronic SOB the past 24 hours. She cannot ambulate as she gets too short of breath. It will wake her from her sleep and she must sit up for a few minutes for the cough to stop. She has not taken any medications to make it better. Nothing except for exertion makes it worse.      She lives with a friend. Patient is on 5L of O2 at home. She does not work and has not for the past 10 years due to disability Family and children and nearby. Non smoker, non drinker, no recreational drug use. She feels well supported by family and friends to help her with her health.

## 2019-08-03 NOTE — ASSESSMENT & PLAN NOTE
Patient has a history of ESRD on HD 3x a week.      Plan  - consulted nephro about HD.  - home meds cinacalcet and sevelamer continued.

## 2019-08-03 NOTE — ASSESSMENT & PLAN NOTE
Patient is on insulin at home. Glu is 95 on admission.    Plan:   - insulin aspart sliding scale.

## 2019-08-03 NOTE — ASSESSMENT & PLAN NOTE
Patient has a hx of ESRD on HD and CHF. This is likely fluid overload due to a combination of these diagnoses. BNP 1464 and troponins 0.156.    Plan  - consulted nephro for HD  - duo nebs for symptomatic tx  - trend troponins

## 2019-08-03 NOTE — ED NOTES
Patient resting in bed. Lights dimmed to promote comfort. Medications administered. Oxygen saturation at 95% from 5L via NC. Continuous pulse oximetry, BP, and cardiac monitoring in place. Call bell within reach. Will continue to monitor.

## 2019-08-03 NOTE — ASSESSMENT & PLAN NOTE
Patient presents with A fib in the ED. Patient is on chronic warfarin. S/P ablation multiple times.    Plan  - continue warfarin.  - pacemaker in place.

## 2019-08-03 NOTE — ED TRIAGE NOTES
Patient presents via EMS with SOB while being at dialysis - stopped at 1.6L due to respiratory distress. Patient presents on nonrebreather at 12L of oxygen. Patient reports non-productive cough for past week, wears oxygen at home.  with EMS.

## 2019-08-03 NOTE — ED NOTES
Patient resting in bed. Mother sitting at the bedside. Continuous pulse oximetry, BP, and cardiac monitoring in place. Call bell within reach. Will continue to monitor.

## 2019-08-03 NOTE — ED NOTES
Attempted to obtain blood samples 2x; requested another RN to attempt.    Xray requested to come back since RN attempting access.

## 2019-08-03 NOTE — ED NOTES
Blood collected and sent to lab! Patient resting in bed on left side; wedge placed underneath her. Extra blanket applied. Side rails up x2. Continuous oxygen 5L via NC. Continuous pulse oximetry, BP, and cardiac monitoring in place. Call bell within reach. Will continue to monitor.

## 2019-08-03 NOTE — ED NOTES
Patient resting in bed. Patient anxious; reaffirmed and encouraged. Will update provider on status. Continuous pulse oximetry, BP, and cardiac monitoring in place. Call bell within reach. Will continue to monitor.

## 2019-08-03 NOTE — ED PROVIDER NOTES
Encounter Date: 8/3/2019       History     Chief Complaint   Patient presents with    Shortness of Breath     Pt c/o SOB after dialysis treatment today. Pt reports productive cough and anxiety. Pt is on 2 lpm of home O2 at all times.      58-year-old female with history of end-stage renal disease, other complicated medical history including recent subdural, on warfarin presenting to emergency department with complaint of shortness of breath at her hemodialysis session today.  Patient receives dialysis Tuesday Thursday Saturday.  She has not missed any sessions recently.  The patient states that while she was at dialysis she developed a worsening of a cough which made her short of breath.  She did not finish her dialysis session.  She states that they take 2 L off, but normally in the past she has had 3-4 L off and she feels that they are under dialyzing her.  She feels that this may be related to her shortness of breath today.  The patient noted over the past 2 days that she has had a cough that is productive of yellow sputum.  She has not noted any fever.  There is no chest pain or palpitations.  Patient is anxious and tearful, states the family will not be able to meet her up here.  She does wear home oxygen, 4-5 L at baseline.    The history is provided by the patient.     Review of patient's allergies indicates:   Allergen Reactions    Bactrim [sulfamethoxazole-trimethoprim] Other (See Comments)     Causes renal failure    Nsaids (non-steroidal anti-inflammatory drug) Other (See Comments)     Renal failure     Past Medical History:   Diagnosis Date    Anemia in ESRD (end-stage renal disease) 3/7/2016    Anticoagulant long-term use     Cervical radiculopathy 7/20/2015    CHF (congestive heart failure)     Chronic combined systolic and diastolic heart failure 6/14/2013    EF 20% 2013, improved with Medical therapy, negative PET 2013    Chronic respiratory failure with hypoxia 04/22/2013    On home oxygen  at 2-5 liters    Closed fracture of proximal end of right fibula 2016    Complications due to renal dialysis device, implant, and graft     DDD (degenerative disc disease), lumbar 2015    Dependence on renal dialysis     Diabetes mellitus type II, controlled 2017    ESRD on hemodialysis 2016    Essential hypertension     Gout     Lateral meniscal tear 2016    Lumbar stenosis 2015    Obesity, Class III, BMI 40-49.9 (morbid obesity)     Pacemaker     Paroxysmal atrial fibrillation 2014    Not on anticoagulation    Peripheral neuropathy 2013    Personal history of gastric ulcer 3/19/2013    Pneumonia of left lower lobe due to infectious organism 3/10/2019    Sarcoidosis, lung 2009    Diagnosed in  with ocular manifestation, on 4L home O2 and PO steroids    Secondary pulmonary hypertension 2015    Seizure disorder 3/19/2013    Shingles 2013    Thyroid disease      Past Surgical History:   Procedure Laterality Date    ABDOMINAL SURGERY      ABLATION N/A 2017    Performed by [facility] at [facility] CATH LAB    ANGIOPLASTY Left 1/10/2018    Performed by Torrey [facility]afana MD at [facility] OR 2ND FLR    ANGIOPLASTY-PERCUTANEOUS TRANSLUMINAL (PTA) Left 2017    Performed by [facility] at [facility] OR 2ND FLR    ANGIOPLASTY-PERCUTANEOUS TRANSLUMINAL (PTA) Left 10/12/2016    Performed by [facility] at [facility] OR 2ND FLR    CARDIAC PACEMAKER PLACEMENT       SECTION      x2    DECLOT GRAFT PERCUTANEOUS Left 2017    Performed by Tor[facility] MD at [facility] OR 2ND FLR    DECLOT-GRAFT Left 2016    Performed by Harjit Starr MD at [facility] CATH LAB    DECLOT-GRAFT Left 2016    Performed by [facility]jit Starr MD at [facility] CATH LAB    ECHOCARDIOGRAM-TRANSESOPHAGEAL N/A 2013    Performed by Delmy Surgeon at [facility] DELMY    Fistulogram Left 2019    Performed by Torrey [facility]ana MD at [facility] CATH  LAB    Fistulogram Left 4/8/2019    Performed by [facility] at [facility] CATH LAB    fistulogram Left 1/10/2018    Performed by [facility] at [facility] OR 2ND FLR    FISTULOGRAM Left 9/13/2017    Performed by [facility] at [facility] CATH LAB    FISTULOGRAM Left 10/12/2016    Performed by [facility] at [facility] OR 2ND FLR    FISTULOGRAM Left 6/21/2016    Performed by Harjit Starr MD at [facility] CATH LAB    Fistulogram, LUE AVG, possible intervention Left 1/30/2019    Performed by Tor[facility]ana MD at [facility] CATH LAB    Fistulogram, OR 11 Left 5/8/2019    Performed by Torrey Vill[facility]ana MD at [facility] OR 2ND FLR    INJECTION-STEROID-EPIDURAL-TRANSFORAMINAL Right 7/20/2015    Performed by Patria Gutierrez MD at [facility] MGT    INJECTION-STEROID-EPIDURAL-TRANSFORAMINAL Right 5/21/2015    Performed by Patria Gutierrez MD at [facility] MGT    [id number] / Left upper AV graft. Left 2/3/2016    Performed by Tor[facility]ana MD at [facility] OR 2ND FLR    JRFLTEZBG-MHRCTMVTS-RPSPOKHLQAVEX N/A 1/11/2017    Performed by Bladimir Adorno MD at [facility] CATH LAB    OTHER SURGICAL HISTORY      loop recorder implant    PLACEMENT-STENT Left 2/7/2017    Performed by Torrey [facility]ana MD at [facility] OR 2ND FLR    PTA, AV FISTULA N/A 7/24/2019    Performed by Tor[facility]ana [facility] at [facility] CATH LAB    PTA, AV FISTULA N/A 1/30/2019    Performed by Torrey [facility]ana MD at [facility] CATH LAB    stent to fistula      Stent, Fistula  7/24/2019    Performed by [facility]ana MD at [facility] CATH LAB    TRANSESOPHAGEAL ECHOCARDIOGRAM (JARAD) N/A 6/27/2016    Performed by Sourav Machuca MD at [facility] CATH LAB    ULTRASOUND, UPPER GI TRACT, ENDOSCOPIC N/A 1/9/2014    Performed by Stewart Burgess MD at [facility] ENDO (2ND FLR)    VASCULAR SURGERY      fistula to L upper arm      Family History   Problem Relation Age of Onset    Kidney failure Mother     Hypertension Mother     Diabetes  Mother     Coronary artery disease Father     Hypertension Father     Diabetes Father     Lupus Sister     Diabetes Maternal Grandmother     Colon cancer Neg Hx     Ovarian cancer Neg Hx     Breast cancer Neg Hx      Social History     Tobacco Use    Smoking status: Former Smoker     Packs/day: 0.50     Years: 10.00     Pack years: 5.00     Types: Cigarettes     Last attempt to quit: 1994     Years since quittin.2    Smokeless tobacco: Never Used   Substance Use Topics    Alcohol use: No     Alcohol/week: 0.0 oz     Comment: rarely    Drug use: No     Review of Systems   Constitutional: Negative for fever.   HENT: Negative for sore throat.    Respiratory: Positive for cough and shortness of breath.    Cardiovascular: Negative for chest pain.   Gastrointestinal: Negative for nausea.   Genitourinary: Negative for dysuria.   Musculoskeletal: Negative for back pain.   Skin: Negative for rash.   Neurological: Negative for weakness.   Hematological: Does not bruise/bleed easily.       Physical Exam     Initial Vitals   BP Pulse Resp Temp SpO2   19 1115 19 1115 19 1115 19 1117 19 1115   132/74 75 (!) 28 97.5 °F (36.4 °C) 100 %      MAP       --                Physical Exam    Constitutional: She appears well-developed. She is cooperative.  Non-toxic appearance. No distress.   HENT:   Head: Normocephalic and atraumatic.   Eyes: EOM are normal. Pupils are equal, round, and reactive to light.   Neck: Neck supple. No muscular tenderness present.   Cardiovascular: Normal rate, regular rhythm and intact distal pulses.   Normal peripheral perfusion   Pulmonary/Chest: Tachypnea noted. She has rales in the right upper field, the right middle field, the right lower field, the left upper field, the left middle field and the left lower field.   Abdominal: Soft. Bowel sounds are normal. She exhibits no distension. There is no tenderness.   Genitourinary: Pelvic exam was performed with  patient supine. There is no lesion on the right labia. There is no lesion on the left labia.   Musculoskeletal: Normal range of motion.   Neurological: She is alert. No cranial nerve deficit. GCS score is 15. GCS eye subscore is 4. GCS verbal subscore is 5. GCS motor subscore is 6.   Skin: Skin is warm and dry. No rash noted.   Psychiatric: She has a normal mood and affect. Her speech is normal and behavior is normal. Thought content normal.         ED Course   Procedures  Labs Reviewed - No data to display  EKG Readings: (Independently Interpreted)   EKG at 11:33 a.m. is a paced rhythm without any acute ischemic change or STEMI.  When compared with previous EKG from July 12, 2019, morphology is similar.       Imaging Results    None       X-Rays:   Independently Interpreted Readings:   Other Readings:  X ray demonstrates pulmonary vascular congestion    Medical Decision Making:   History:   Old Medical Records: I decided to obtain old medical records.  Initial Assessment:   58-year-old female with above history presenting to emergency department with complaint of new productive cough, and shortness of breath. Here she is afebrile, mildly tachypneic, anxious and tearful.  Blood pressures are stable.  Lung sounds are coarse and she does have some bilateral lower extremity edema. EKG performed from triage does not reveal any acute ischemic change.  Will obtain x-ray to evaluate for any pneumonia or similar process, given the new productive cough.  We will also obtain labs including CBC CMP, troponin, BNP, and PT/INR.  Independently Interpreted Test(s):   I have ordered and independently interpreted EKG Reading(s) - see prior notes  Clinical Tests:   Lab Tests: Ordered and Reviewed       <> Summary of Lab: Patient's blood work was reviewed, no leukocytosis, no significant electrolyte derangement, potassium within normal limits, no significant acidosis, troponin is similar to previous.  BNP is actually improved from  previous.  Radiological Study: Ordered and Reviewed  Medical Tests: Ordered and Reviewed  ED Management:  Patient was placed on her home dose of 5 L of oxygen by nasal cannula.  This kept her in her normal saturation range which is in the mid 90s on oxygen and in the 80s with exertion.  She declined any BiPAP and I do not believe that it is absolutely necessary at this time.    I believe that the best course of action for this patient is dialysis.  I do not think it needs to be emergent.  I discussed the case with the nephrologist on call who will arrange for this to be performed inpatient.    On reassessment patient's work of breathing is improved.  She did receive some Ativan for her anxiety. I was alerted by the nurse that the patient's blood pressure had dipped, she was due for her midodrine so this was given.  I am not concerned about sepsis causing hypotension at this time as the patient has a known history of hypotension.    Although patient is subtherapeutic on her INR, I believe this is less consistent with a PE at this time.  No evidence of pneumonia or any other serious infection.  I believe patient's symptoms today are more likely attributable to fluid overload.  Case is discussed with the hospitalist who accepts the patient for admission.                      Clinical Impression:       ICD-10-CM ICD-9-CM   1. Shortness of breath R06.02 786.05         Disposition:   Disposition: Admitted  Condition: Stable                        Ivette Lawton MD  08/03/19 1983

## 2019-08-03 NOTE — ED NOTES
Patient resting in bed. Family came to visit. Patient reports feeling fatigued. Hands cold. Warm blanket applied. Continuous pulse oximetry, BP, and cardiac monitoring in place. Call bell within reach. Will continue to monitor.

## 2019-08-03 NOTE — ED NOTES
Patient identifiers for Sisi Medel 58 y.o. female checked and correct.  Chief Complaint   Patient presents with    Shortness of Breath     Pt c/o SOB after dialysis treatment today. Pt reports productive cough and anxiety. Pt is on 2 lpm of home O2 at all times.      Past Medical History:   Diagnosis Date    Anemia in ESRD (end-stage renal disease) 3/7/2016    Anticoagulant long-term use     Cervical radiculopathy 7/20/2015    CHF (congestive heart failure)     Chronic combined systolic and diastolic heart failure 6/14/2013    EF 20% 2013, improved with Medical therapy, negative PET 2013    Chronic respiratory failure with hypoxia 04/22/2013    On home oxygen at 2-5 liters    Closed fracture of proximal end of right fibula 6/27/2016    Complications due to renal dialysis device, implant, and graft     DDD (degenerative disc disease), lumbar 6/17/2015    Dependence on renal dialysis     Diabetes mellitus type II, controlled 12/8/2017    ESRD on hemodialysis 2/23/2016    Essential hypertension     Gout     Lateral meniscal tear 5/31/2016    Lumbar stenosis 6/17/2015    Obesity, Class III, BMI 40-49.9 (morbid obesity)     Pacemaker     Paroxysmal atrial fibrillation 5/16/2014    Not on anticoagulation    Peripheral neuropathy 11/13/2013    Personal history of gastric ulcer 3/19/2013    Pneumonia of left lower lobe due to infectious organism 3/10/2019    Sarcoidosis, lung 2009    Diagnosed in 2009 with ocular manifestation, on 4L home O2 and PO steroids    Secondary pulmonary hypertension 4/7/2015    Seizure disorder 3/19/2013    Shingles 11/13/2013    Thyroid disease      Allergies reported:   Review of patient's allergies indicates:   Allergen Reactions    Bactrim [sulfamethoxazole-trimethoprim] Other (See Comments)     Causes renal failure    Nsaids (non-steroidal anti-inflammatory drug) Other (See Comments)     Renal failure         LOC: Patient is awake, alert, and aware of  "environment with an appropriate affect. Patient is oriented x 3 and speaking appropriately. Patient is anxious; SOB worsens with anxiety.  APPEARANCE: Patient resting comfortably and in no acute distress. Patient is clean and well groomed, patient's clothing is properly fastened.  HEENT: Reports her ears are numb, "can hardly hear out of them". Denies sore throat.  SKIN: The skin is warm and dry. Patient has normal skin turgor and moist mucus membranes. Fistula to left arm. Healed scar to right calf.  MUSKULOSKELETAL: Patient is moving all extremities well, no obvious deformities noted. Pulses intact. Generalized weakness. States she's unable to walk on her own.   RESPIRATORY: Airway is open and patent. Respirations are spontaneous and labored. Patient presents on nonrebreather at 12L; 96% oxygen saturation. EMS reported patient was at 90% oxygen on 10L via NC. Patient reports wearing 5L oxygen via NC. Rales noted to upper lobes. Talking causes patient to go into coughing fits.  CARDIAC: Patient has a paced normal rate and rhythm. 69 on cardiac monitor, No peripheral edema noted. Pacemaker to right chest. States chest pain with cough; 8/10. Cough is persistent over past 3-4 days; nonproductive.   ABDOMEN: No distention noted. Soft and non-tender upon palpation. Reports nausea, denies vomiting. Last BM two days ago; states this is normal. States dysuria due to dialysis.  NEUROLOGICAL: PERRL. Facial expression is symmetrical. Hand grasps are equal bilaterally. Normal sensation in all extremities when touched with finger. Reports headache 10/10.          "

## 2019-08-03 NOTE — SUBJECTIVE & OBJECTIVE
Past Medical History:   Diagnosis Date    Anemia in ESRD (end-stage renal disease) 3/7/2016    Anticoagulant long-term use     Cervical radiculopathy 2015    CHF (congestive heart failure)     Chronic combined systolic and diastolic heart failure 2013    EF 20% , improved with Medical therapy, negative PET     Chronic respiratory failure with hypoxia 2013    On home oxygen at 2-5 liters    Closed fracture of proximal end of right fibula 2016    Complications due to renal dialysis device, implant, and graft     DDD (degenerative disc disease), lumbar 2015    Dependence on renal dialysis     Diabetes mellitus type II, controlled 2017    ESRD on hemodialysis 2016    Essential hypertension     Gout     Lateral meniscal tear 2016    Lumbar stenosis 2015    Obesity, Class III, BMI 40-49.9 (morbid obesity)     Pacemaker     Paroxysmal atrial fibrillation 2014    Not on anticoagulation    Peripheral neuropathy 2013    Personal history of gastric ulcer 3/19/2013    Pneumonia of left lower lobe due to infectious organism 3/10/2019    Sarcoidosis, lung 2009    Diagnosed in  with ocular manifestation, on 4L home O2 and PO steroids    Secondary pulmonary hypertension 2015    Seizure disorder 3/19/2013    Shingles 2013    Thyroid disease        Past Surgical History:   Procedure Laterality Date    ABDOMINAL SURGERY      ABLATION N/A 2017    Performed by Bladimir Adorno MD at [facility] CATH LAB    ANGIOPLASTY Left 1/10/2018    Performed by Torrey Villafana MD at [facility] OR 2ND FLR    ANGIOPLASTY-PERCUTANEOUS TRANSLUMINAL (PTA) Left 2017    Performed by Torrey [facility] MD at [facility] OR 2ND FLR    ANGIOPLASTY-PERCUTANEOUS TRANSLUMINAL (PTA) Left 10/12/2016    Performed by [facility]rey [facility]ana MD at [facility] OR 2ND FLR    CARDIAC PACEMAKER PLACEMENT       SECTION      x2    DECLOT GRAFT PERCUTANEOUS Left  2/7/2017    Performed by [facility] MD at [facility] OR 2ND FLR    DECLOT-GRAFT Left 6/21/2016    Performed by Harjit Starr MD at [facility] CATH LAB    DECLOT-GRAFT Left 6/20/2016    Performed by [facility]jit Starr MD at [facility] CATH LAB    ECHOCARDIOGRAM-TRANSESOPHAGEAL N/A 6/27/2013    Performed by Delmy Surgeon at [facility] DELMY    Fistulogram Left 7/24/2019    Performed by [facility]afana MD at [facility] CATH LAB    Fistulogram Left 4/8/2019    Performed by Torrey [facility]afana MD at [facility] CATH LAB    fistulogram Left 1/10/2018    Performed by Torrey Villafana MD at [facility] OR 2ND FLR    FISTULOGRAM Left 9/13/2017    Performed by Torrey [facility]afana MD at [facility] CATH LAB    FISTULOGRAM Left 10/12/2016    Performed by Torrey [facility]afana MD at [facility] OR 2ND FLR    FISTULOGRAM Left 6/21/2016    Performed by Harjit Starr MD at [facility] CATH LAB    Fistulogram, LUE AVG, possible intervention Left 1/30/2019    Performed by Torrey [facility]afana MD at [facility] CATH LAB    Fistulogram, OR 11 Left 5/8/2019    Performed by Torrey Villafana MD at [facility] OR 2ND FLR    INJECTION-STEROID-EPIDURAL-TRANSFORAMINAL Right 7/20/2015    Performed by [facility]rez MD at [facility] PAIN MGT    INJECTION-STEROID-EPIDURAL-TRANSFORAMINAL Right 5/21/2015    Performed by Patria [facility]rez MD at [facility] PAIN MGT    [id number] / Left upper AV graft. Left 2/3/2016    Performed by Torrey [facility] MD at [facility] OR 2ND FLR    [id number]-[id number]-[id number] N/A 1/11/2017    Performed by Bladimir Adorno MD at [facility] CATH LAB    OTHER SURGICAL HISTORY      loop recorder implant    PLACEMENT-STENT Left 2/7/2017    Performed by Torrey Villafana MD at [facility] OR 2ND FLR    PTA, AV FISTULA N/A 7/24/2019    Performed by Torrey [facility]afana MD at [facility] CATH LAB    PTA, AV FISTULA N/A 1/30/2019    Performed by Torrey Villafana MD at [facility] CATH LAB    stent to fistula      Stent, Fistula  7/24/2019    [facility] CATH [facility]    TRANSESOPHAGEAL ECHOCARDIOGRAM (JARAD) N/A 6/27/2016    Performed by Sourav Machuca MD at [facility] CATH LAB    ULTRASOUND, UPPER GI TRACT, ENDOSCOPIC N/A 1/9/2014    Performed by Stewart Burgess MD at [facility] ENDO (2ND FLR)    VASCULAR SURGERY      fistula to L upper arm        Review of patient's allergies indicates:   Allergen Reactions    Bactrim [sulfamethoxazole-trimethoprim] Other (See Comments)     Causes renal failure    Nsaids (non-steroidal anti-inflammatory drug) Other (See Comments)     Renal failure       Current Facility-Administered Medications on File Prior to Encounter   Medication    ceFAZolin injection 2 g    lidocaine (PF) 10 mg/ml (1%) injection 10 mg    mupirocin 2 % ointment    sodium chloride 0.9% flush 10 mL     Current Outpatient Medications on File Prior to Encounter   Medication Sig    midodrine (PROAMATINE) 10 MG tablet Take 1 tablet (10 mg total) by mouth 3 (three) times daily.    atorvastatin (LIPITOR) 80 MG tablet TAKE 1 TABLET BY MOUTH EVERY EVENING    blood sugar diagnostic Strp 1 each by Misc.(Non-Drug; Combo Route) route once daily. (Patient taking differently: 1 each by Misc.(Non-Drug; Combo Route) route 2 (two) times daily. )    cinacalcet (SENSIPAR) 30 MG Tab Take 30 mg by mouth daily with breakfast.    clopidogrel (PLAVIX) 75 mg tablet TAKE 1 TABLET(75 MG) BY MOUTH EVERY DAY    fludrocortisone (FLORINEF) 0.1 mg Tab Take 2 tablets (200 mcg total) by mouth 2 (two) times daily.    lancets Misc 1 each by Misc.(Non-Drug; Combo Route) route once daily. (Patient taking differently: 1 each by Misc.(Non-Drug; Combo Route) route 2 (two) times daily. )    levETIRAcetam (KEPPRA) 500 MG Tab TAKE 1 TABLET BY MOUTH TWICE DAILY    lisinopril (PRINIVIL,ZESTRIL) 2.5 MG tablet Take 1 tablet (2.5 mg total) by mouth once daily. Hold for systolic BP <100    LORazepam (ATIVAN) 1 MG tablet Take 0.5 tablets (0.5 mg total) by mouth every 8 (eight) hours as needed  for Anxiety.    miconazole NITRATE 2 % (MICOTIN) 2 % top powder Apply topically 2 (two) times daily.    multivitamin (ONE DAILY MULTIVITAMIN) per tablet Take 1 tablet by mouth once daily.    nortriptyline (PAMELOR) 25 MG capsule Take 1 capsule by mouth nightly    omeprazole (PRILOSEC) 20 MG capsule TAKE 1 CAPSULE BY MOUTH EVERY DAY    oxyCODONE-acetaminophen (PERCOCET) 5-325 mg per tablet Take 1 tablet by mouth 3 (three) times daily as needed (pain >4 after acetaminophen given).    patiromer calcium sorbitex (VELTASSA) 16.8 gram PwPk Hold until follow-up with PCP    prednisoLONE acetate (PRED FORTE) 1 % DrpS INSTILL ONE DROP INTO  LEFT EYE THREE TIMES A DAY    predniSONE (DELTASONE) 20 MG tablet Take 1 tablet (20 mg total) by mouth once daily.    PROLENSA 0.07 % Drop INSTILL 1 DROP IN LEFT / RIGHT EYE QD    DENISE-LINDA 0.8 mg Tab TAKE 1 TABLET BY MOUTH ONCE DAILY    senna-docusate 8.6-50 mg (PERICOLACE) 8.6-50 mg per tablet Take 1 tablet by mouth 2 (two) times daily.    sevelamer carbonate (RENVELA) 800 mg Tab Take 1 tablet (800 mg total) by mouth daily with dinner or evening meal.    tiZANidine (ZANAFLEX) 4 MG tablet Take 4 mg by mouth nightly as needed.    TRADJENTA 5 mg Tab tablet TAKE 1 TABLET(5 MG) BY MOUTH EVERY DAY    warfarin (COUMADIN) 5 MG tablet Take 1 tablet (5mg)  by mouth every Tues, Thurs, Sat and 1/2 tablet (2.5mg) every Mon, Wed, Fri. And Sun.     Family History     Problem Relation (Age of Onset)    Coronary artery disease Father    Diabetes Mother, Father, Maternal Grandmother    Hypertension Mother, Father    Kidney failure Mother    Lupus Sister        Tobacco Use    Smoking status: Former Smoker     Packs/day: 0.50     Years: 10.00     Pack years: 5.00     Types: Cigarettes     Last attempt to quit: 1994     Years since quittin.2    Smokeless tobacco: Never Used   Substance and Sexual Activity    Alcohol use: No     Alcohol/week: 0.0 oz     Comment: rarely    Drug  use: No    Sexual activity: Not Currently     Review of Systems   Constitutional: Positive for chills and fatigue. Negative for fever.   HENT: Positive for sore throat. Negative for congestion.    Eyes: Positive for pain. Negative for visual disturbance.   Respiratory: Positive for cough and shortness of breath.    Cardiovascular: Positive for chest pain and palpitations.   Gastrointestinal: Positive for nausea. Negative for abdominal pain, constipation, diarrhea and vomiting.   Genitourinary:        Patient does not urinate.    Musculoskeletal: Negative for back pain and myalgias.   Skin: Negative for color change and pallor.   Neurological: Positive for dizziness and headaches.   Psychiatric/Behavioral: Negative for confusion and decreased concentration.     Objective:     Vital Signs (Most Recent):  Temp: 97.9 °F (36.6 °C) (08/03/19 1338)  Pulse: 70 (08/03/19 1725)  Resp: (!) 24 (08/03/19 1338)  BP: (!) 96/51 (08/03/19 1725)  SpO2: 96 % (08/03/19 1653) Vital Signs (24h Range):  Temp:  [97.5 °F (36.4 °C)-97.9 °F (36.6 °C)] 97.9 °F (36.6 °C)  Pulse:  [66-78] 70  Resp:  [24-28] 24  SpO2:  [90 %-100 %] 96 %  BP: ()/(51-74) 96/51     Weight: 108.9 kg (240 lb)  Body mass index is 38.74 kg/m².    Physical Exam   Constitutional: She is oriented to person, place, and time. She appears well-developed and well-nourished.   Eyes: Pupils are equal, round, and reactive to light. Conjunctivae and EOM are normal.   Neck: Normal range of motion. Neck supple.   Cardiovascular: Normal rate, regular rhythm and normal heart sounds.   Pulmonary/Chest: She is in respiratory distress. She has rales.   Crackles throughout all lung bases.    Abdominal: Soft. Bowel sounds are normal. She exhibits no distension. There is no tenderness.   Musculoskeletal: She exhibits edema.   Pitting edema bilat to shins   Neurological: She is alert and oriented to person, place, and time.   Skin: Skin is warm and dry.   Psychiatric: She has a normal  mood and affect. Her behavior is normal. Thought content normal.   Vitals reviewed.        CRANIAL NERVES     CN III, IV, VI   Pupils are equal, round, and reactive to light.  Extraocular motions are normal.        Significant Labs:   ABGs: No results for input(s): PH, PCO2, HCO3, POCSATURATED, BE, TOTALHB, COHB, METHB, O2HB, POCFIO2 in the last 48 hours.  CBC:   Recent Labs   Lab 08/03/19  1252   WBC 7.14   HGB 10.4*   HCT 36.0*        CMP:   Recent Labs   Lab 08/03/19  1252   *   K 3.9   CL 87*   CO2 27   GLU 95   BUN 13   CREATININE 2.4*   CALCIUM 8.8   PROT 7.4   ALBUMIN 2.8*   BILITOT 1.0   ALKPHOS 124   AST 45*   ALT 24   ANIONGAP 18*   EGFRNONAA 21.8*     Cardiac Markers:   Recent Labs   Lab 08/03/19  1252   BNP 1,464*     Coagulation:   Recent Labs   Lab 08/03/19  1252   INR 1.3*       Significant Imaging:     CXR: Cardiomegaly with central pulmonary vascular edema, consistent with congestive heart failure.    EKG: A fib with LVH with QRS widening

## 2019-08-04 PROBLEM — J96.22 ACUTE ON CHRONIC RESPIRATORY FAILURE WITH HYPOXIA AND HYPERCAPNIA: Status: ACTIVE | Noted: 2019-01-01

## 2019-08-04 NOTE — SUBJECTIVE & OBJECTIVE
Past Medical History:   Diagnosis Date    Anemia in ESRD (end-stage renal disease) 3/7/2016    Anticoagulant long-term use     Cervical radiculopathy 2015    CHF (congestive heart failure)     Chronic combined systolic and diastolic heart failure 2013    EF 20% , improved with Medical therapy, negative PET     Chronic respiratory failure with hypoxia 2013    On home oxygen at 2-5 liters    Closed fracture of proximal end of right fibula 2016    Complications due to renal dialysis device, implant, and graft     DDD (degenerative disc disease), lumbar 2015    Dependence on renal dialysis     Diabetes mellitus type II, controlled 2017    ESRD on hemodialysis 2016    Essential hypertension     Gout     Lateral meniscal tear 2016    Lumbar stenosis 2015    Obesity, Class III, BMI 40-49.9 (morbid obesity)     Pacemaker     Paroxysmal atrial fibrillation 2014    Not on anticoagulation    Peripheral neuropathy 2013    Personal history of gastric ulcer 3/19/2013    Pneumonia of left lower lobe due to infectious organism 3/10/2019    Sarcoidosis, lung 2009    Diagnosed in  with ocular manifestation, on 4L home O2 and PO steroids    Secondary pulmonary hypertension 2015    Seizure disorder 3/19/2013    Shingles 2013    Thyroid disease        Past Surgical History:   Procedure Laterality Date    ABDOMINAL SURGERY      ABLATION N/A 2017    Performed by Bladimir Adorno MD at [facility] CATH LAB    ANGIOPLASTY Left 1/10/2018    Performed by Torrey Villafana MD at [facility] OR 2ND FLR    ANGIOPLASTY-PERCUTANEOUS TRANSLUMINAL (PTA) Left 2017    Performed by Torrey [facility]afana MD at [facility] OR 2ND FLR    ANGIOPLASTY-PERCUTANEOUS TRANSLUMINAL (PTA) Left 10/12/2016    Performed by Torrey Villafana MD at [facility] OR 2ND FLR    CARDIAC PACEMAKER PLACEMENT       SECTION      x2    DECLOT GRAFT PERCUTANEOUS Left  2/7/2017    Performed by [facility] MD at [facility] OR 2ND FLR    DECLOT-GRAFT Left 6/21/2016    Performed by Harjit Starr MD at [facility] CATH LAB    DECLOT-GRAFT Left 6/20/2016    Performed by Harjit Starr MD at [facility] CATH LAB    ECHOCARDIOGRAM-TRANSESOPHAGEAL N/A 6/27/2013    Performed by Delmy Surgeon at [facility] DELMY    Fistulogram Left 7/24/2019    Performed by Torrey [facility]afana MD at [facility] CATH LAB    Fistulogram Left 4/8/2019    Performed by Torrey Villafana MD at [facility] CATH LAB    fistulogram Left 1/10/2018    Performed by Torrey Villafana MD at [facility] OR 2ND FLR    FISTULOGRAM Left 9/13/2017    Performed by Torrey Villafana MD at [facility] CATH LAB    FISTULOGRAM Left 10/12/2016    Performed by Torrey [facility]ana MD at [facility] OR 2ND FLR    FISTULOGRAM Left 6/21/2016    Performed by Harjit Starr MD at [facility] CATH LAB    Fistulogram, LUE AVG, possible intervention Left 1/30/2019    Performed by Torrey [facility]ana MD at [facility] CATH LAB    Fistulogram, OR 11 Left 5/8/2019    Performed by Torrey Villafana MD at [facility] OR 2ND FLR    INJECTION-STEROID-EPIDURAL-TRANSFORAMINAL Right 7/20/2015    Performed by Pat[facility]rez MD at [facility] [place] PAIN MGT    INJECTION-STEROID-EPIDURAL-TRANSFORAMINAL Right 5/21/2015    Performed by Patria Gutierrez MD at [facility] [place] PAIN MGT    [id number] / Left upper AV graft. Left 2/3/2016    Performed by Torrey [facility] MD at [facility] OR 2ND FLR    [id number]-[id number]-[id number] N/A 1/11/2017    Performed by Bladimir Adorno MD at [facility] CATH LAB    OTHER SURGICAL HISTORY      loop recorder implant    PLACEMENT-STENT Left 2/7/2017    Performed by Torrey Villafana MD at [facility] OR 2ND FLR    PTA, AV FISTULA N/A 7/24/2019    Performed by Torrey Villafana MD at [facility] CATH LAB    PTA, AV FISTULA N/A 1/30/2019    Performed by Torrey [facility]ana MD at [facility] CATH LAB    stent to fistula      Stent, Fistula  7/24/2019    Performed by [facility]ana,  MD at [facility] CATH LAB    TRANSESOPHAGEAL ECHOCARDIOGRAM (JARAD) N/A 6/27/2016    Performed by Sourav Machuca MD at [facility] CATH LAB    ULTRASOUND, UPPER GI TRACT, ENDOSCOPIC N/A 1/9/2014    Performed by Stewart Burgess MD at [facility] ENDO (2ND FLR)    VASCULAR SURGERY      fistula to L upper arm        Review of patient's allergies indicates:   Allergen Reactions    Bactrim [sulfamethoxazole-trimethoprim] Other (See Comments)     Causes renal failure    Nsaids (non-steroidal anti-inflammatory drug) Other (See Comments)     Renal failure     Current Facility-Administered Medications   Medication Frequency    0.9%  NaCl infusion Once    acetaminophen tablet 650 mg Q4H PRN    albumin human 25% bottle 25 g Once    albuterol-ipratropium 2.5 mg-0.5 mg/3 mL nebulizer solution 3 mL Q6H PRN    cinacalcet tablet 30 mg Daily with breakfast    clopidogrel tablet 75 mg Daily    dextrose 10% (D10W) Bolus PRN    dextrose 10% (D10W) Bolus PRN    dextrose 50% injection 25 g PRN    fludrocortisone tablet 200 mcg BID    glucagon (human recombinant) injection 1 mg PRN    glucose chewable tablet 16 g PRN    glucose chewable tablet 24 g PRN    HYDROcodone-acetaminophen 5-325 mg per tablet 1 tablet Q6H PRN    hydrocortisone sodium succinate injection 50 mg Q8H    insulin aspart U-100 pen 0-5 Units QID (AC + HS) PRN    levETIRAcetam tablet 500 mg BID    LORazepam tablet 0.5 mg Q8H PRN    miconazole NITRATE 2 % top powder BID    midodrine tablet 10 mg TID    midodrine tablet 10 mg Once    norepinephrine 4 mg in dextrose 5% 250 mL infusion (premix) (titrating) Continuous    nortriptyline capsule 25 mg Nightly    ondansetron disintegrating tablet 8 mg Q8H PRN    pantoprazole EC tablet 40 mg Daily    phenylephrine HCl in 0.9% NaCl 1 mg/10 mL (100 mcg/mL) syringe     prednisoLONE acetate 1 % ophthalmic suspension 1 drop TID    propofol (DIPRIVAN) 10 mg/mL infusion Continuous    senna-docusate 8.6-50 mg per tablet 1  tablet BID    sevelamer carbonate tablet 800 mg Daily with dinner    sodium chloride 0.9% flush 10 mL PRN    tiZANidine tablet 4 mg Nightly PRN    warfarin (COUMADIN) tablet 2.5 mg Daily     Facility-Administered Medications Ordered in Other Encounters   Medication Frequency    ceFAZolin injection 2 g On Call Procedure    lidocaine (PF) 10 mg/ml (1%) injection 10 mg Once    mupirocin 2 % ointment On Call Procedure    sodium chloride 0.9% flush 10 mL PRN     Family History     Problem Relation (Age of Onset)    Coronary artery disease Father    Diabetes Mother, Father, Maternal Grandmother    Hypertension Mother, Father    Kidney failure Mother    Lupus Sister        Tobacco Use    Smoking status: Former Smoker     Packs/day: 0.50     Years: 10.00     Pack years: 5.00     Types: Cigarettes     Last attempt to quit: 1994     Years since quittin.3    Smokeless tobacco: Never Used   Substance and Sexual Activity    Alcohol use: No     Alcohol/week: 0.0 oz     Comment: rarely    Drug use: No    Sexual activity: Not Currently     Review of Systems   Constitutional: Positive for chills and fatigue. Negative for fever.   HENT: Positive for sore throat. Negative for congestion.    Eyes: Positive for pain. Negative for visual disturbance.   Respiratory: Positive for cough and shortness of breath.    Cardiovascular: Positive for chest pain and palpitations.   Gastrointestinal: Positive for nausea. Negative for abdominal pain, constipation, diarrhea and vomiting.   Genitourinary:        Patient does not urinate.    Musculoskeletal: Negative for back pain and myalgias.   Skin: Negative for color change and pallor.   Neurological: Positive for dizziness and headaches.   Psychiatric/Behavioral: Negative for confusion and decreased concentration.     Objective:     Vital Signs (Most Recent):  Temp: 97.9 °F (36.6 °C) (19 0900)  Pulse: 66 (19 1230)  Resp: (!) 30 (19 1230)  BP: (!) 81/52  (08/04/19 1230)  SpO2: 100 % (08/04/19 1230)  O2 Device (Oxygen Therapy): ventilator (08/04/19 1200) Vital Signs (24h Range):  Temp:  [97.1 °F (36.2 °C)-97.9 °F (36.6 °C)] 97.9 °F (36.6 °C)  Pulse:  [65-84] 66  Resp:  [18-44] 30  SpO2:  [90 %-100 %] 100 %  BP: ()/(40-92) 81/52     Weight: 108.3 kg (238 lb 11.2 oz) (08/04/19 0400)  Body mass index is 38.53 kg/m².  Body surface area is 2.25 meters squared.    No intake/output data recorded.    Physical Exam   Constitutional: She is oriented to person, place, and time. She appears well-developed and well-nourished.   HENT:   Head: Normocephalic.   Eyes: Pupils are equal, round, and reactive to light. Conjunctivae and EOM are normal.   Neck: Normal range of motion. Neck supple.   Cardiovascular: Normal rate, regular rhythm and normal heart sounds.   Pulmonary/Chest: She is in respiratory distress. She has rales.   Crackles throughout all lung bases.    Abdominal: Soft. Bowel sounds are normal. She exhibits no distension. There is no tenderness.   Musculoskeletal: She exhibits edema.   Pitting edema bilat to shins   Neurological: She is alert and oriented to person, place, and time.   Skin: Skin is warm and dry.   Psychiatric: She has a normal mood and affect. Her behavior is normal. Thought content normal.   Nursing note and vitals reviewed.      Significant Labs:  CBC:   Recent Labs   Lab 08/04/19  0651   WBC 5.99   RBC 4.06   HGB 10.3*   HCT 35.9*   PLT SEE COMMENT   MCV 88   MCH 25.4*   MCHC 28.7*     CMP:   Recent Labs   Lab 08/04/19 0651   GLU 69*   CALCIUM 8.9   ALBUMIN 2.4*   PROT 6.9   *   K 4.3   CO2 30*   CL 88*   BUN 21*   CREATININE 3.3*   ALKPHOS 118   ALT 22   AST 38   BILITOT 0.8     All labs within the past 24 hours have been reviewed.    Significant Imaging:  CXR personally reviewed.

## 2019-08-04 NOTE — PROGRESS NOTES
Pt arrived to floor per stretcher with O2 5L NC and telemetry monitoring. AAOX4, hypotension (86/54), tachypneic (30s), moderate WOB, other VS stable. Pt oriented to room, call bell in reach. Bed at lowest position, side rails upx2. Family at bedside. Will continue to monitor.

## 2019-08-04 NOTE — PROCEDURES
"Sisi Medel is a 58 y.o. female patient.    Temp: 97.6 °F (36.4 °C) (08/04/19 1500)  Pulse: 67 (08/04/19 1815)  Resp: (!) 36 (08/04/19 1815)  BP: (!) 117/56 (08/04/19 1815)  SpO2: 100 % (08/04/19 1815)  Weight: 108.3 kg (238 lb 11.2 oz) (08/04/19 0400)  Height: 5' 6" (167.6 cm) (08/03/19 1117)       Intubation  Date/Time: 8/4/2019 6:50 PM  Location procedure was performed: [facility] CARDIAC MEDICAL ICU ([facility]ICU)  Performed by: Cristian Chung MD  Authorized by: Cristian Chung MD   Consent Done: Emergent Situation  Indications: respiratory failure and  airway protection  Intubation method: video-assisted  Patient status: paralyzed (RSI)  Sedatives: etomidate  Paralytic: rocuronium  Laryngoscope size: Mac 4  Tube size: 8.0 mm  Tube type: cuffed  Number of attempts: 1  Cricoid pressure: no  Cords visualized: yes  Post-procedure assessment: chest rise and CO2 detector  Breath sounds: clear and equal  Cuff inflated: yes  ETT to lip: 23 cm  Tube secured with: ETT maldonado  Chest x-ray interpreted by me.  Chest x-ray findings: endotracheal tube in appropriate position  Patient tolerance: Patient tolerated the procedure well with no immediate complications  Complications: No  Specimens: No          Cristian Chung  8/4/2019       I was present and supervised entire procedure. Tolerated well. Separate from critical care time.     Jules Robertson M.D.   [facility]   "

## 2019-08-04 NOTE — PROCEDURES
"Sisi Medel is a 58 y.o. female patient.    Temp: 97.9 °F (36.6 °C) (08/04/19 0900)  Pulse: 66 (08/04/19 1230)  Resp: (!) 30 (08/04/19 1230)  BP: (!) 81/52 (08/04/19 1230)  SpO2: 100 % (08/04/19 1230)  Weight: 108.3 kg (238 lb 11.2 oz) (08/04/19 0400)  Height: 5' 6" (167.6 cm) (08/03/19 1117)       Central Line  Date/Time: 8/4/2019 1:13 PM  Location procedure was performed: [facility] CRITICAL CARE MEDICINE  Performed by: Cyrus Keita MD  Supervising provider: Rusty Marino  Assisting provider: Rusty Marino MD  Pre-operative Diagnosis: Hypotension  Post-operative diagnosis: Hypotension  Consent Done: Emergent Situation  Time out: Immediately prior to procedure a "time out" was called to verify the correct patient, procedure, equipment, support staff and site/side marked as required.  Indications: hemodialysis  Anesthesia: local infiltration    Anesthesia:  Local Anesthetic: lidocaine 1% without epinephrine  Anesthetic total: 4 mL  Preparation: skin prepped with ChloraPrep  Skin prep agent dried: skin prep agent completely dried prior to procedure  Sterile barriers: all five maximum sterile barriers used - cap, mask, sterile gown, sterile gloves, and large sterile sheet  Hand hygiene: hand hygiene performed prior to central venous catheter insertion  Location details: right internal jugular  Catheter type: dialysis  Catheter size: 9 Fr  Catheter Length: 16cm    Ultrasound guidance: yes  Vessel Caliber: large, patent, compressibility normal  Needle advanced into vessel with real time Ultrasound guidance.  Guidewire confirmed in vessel.  Sterile sheath used.  Manometry: Yes  Number of attempts: 1  Assessment: placement verified by x-ray,  no pneumothorax on x-ray and successful placement  Complications: none  Specimens: No  Implants: No  Post-procedure: line sutured  Complications: No            Cyrus Keita  8/4/2019       General supervision provided. Separate from Critical Care time     Jules " CATHRYN Robertson M.D.   [facility] Pul[facility]/Critical Care Medicine

## 2019-08-04 NOTE — ASSESSMENT & PLAN NOTE
Patient presents with A fib in the ED. Patient is on chronic warfarin. S/P ablation multiple times.  -Rales and pitting edema on exam  -Did not complete dialysis yesterday, most likely this is due to volume overload    Plan  -Nephro has been contacted, plan for SCUF  -Duonebs as needed for symptomatic trx

## 2019-08-04 NOTE — ASSESSMENT & PLAN NOTE
Patient has hx of HF (EF 35%). CXR in the ED suggestive of HF. BNP 1424 and troponins 0.156.  Patient is unsure if she takes her lisinopril at home.  -repeat trop 0.153  08/04 At time of step up appears volume overloaded, pitting edema and rales b/l lower lobes  -Awaiting SCUF trial to remove volume

## 2019-08-04 NOTE — ASSESSMENT & PLAN NOTE
-Right HD catheter placed  -Did not have full dialysis session yesterday  -Nephro onboard  -Plan for SCUF today

## 2019-08-04 NOTE — HPI
57 y/o Black or  woman with PMHx of pulmonary sarcoidosis on 3-5L home oxygen, Class 3 HFrEF (3/2019 EF 35%), perm Afib/flutter s/p 6/2017 AVN ablation & PPM, ESRD (TTS), Type 2 DM (3/2019 A1c 6.2%), pulmonary HTN, YOANDY, and orthostatic hypotension admitted to [facility] after presenting with SOB after completing her HD session on Saturday, 8/3/2019.  She could not complete her treatment, stopped at 1.6L removal because she developed respiratory distress, placed on nonrebreather mask and transferred here for further workup.     Nephrology consulted for management of ESRD and HD treatments.     Outpatient HD unit: [facility]   -Nephrologist: Patient does not know  -HD tx days: TThS  -HD tx time: 4hrs 45mins    -HD access: LUE AVF  -HD modality: iHD  -Residual urine: Minimal  -EDW: 109 kg

## 2019-08-04 NOTE — PROGRESS NOTES
Pharmacokinetic Initial Assessment: IV Vancomycin    Assessment/Plan:    Initiate intravenous vancomycin with loading dose of 1500 mg once with subsequent doses when random concentrations are less than 25 mcg/ml  Desired empiric serum trough concentration is 10 to 20 mcg/mL.  Draw vancomycin random level on 8/5 at 0500.  Pharmacy will continue to follow and monitor vancomycin.      Please contact pharmacy at extension [phone] with any questions regarding this assessment.     Thank you for the consult,   Julia Willett     Patient brief summary:  Sisi Medel is a 58 y.o. female initiated on antimicrobial therapy with IV Vancomycin for treatment of suspected bacteremia    Drug Allergies:   Review of patient's allergies indicates:   Allergen Reactions    Bactrim [sulfamethoxazole-trimethoprim] Other (See Comments)     Causes renal failure    Nsaids (non-steroidal anti-inflammatory drug) Other (See Comments)     Renal failure       Actual Body Weight:   108.3 kg    Renal Function:   Estimated Creatinine Clearance: 23.4 mL/min (A) (based on SCr of 3.3 mg/dL (H)).,     Dialysis Method (if applicable):  SCUF    CBC (last 72 hours):  Recent Labs   Lab Result Units 08/03/19  1252 08/04/19  0651   WBC K/uL 7.14 5.99   Hemoglobin g/dL 10.4* 10.3*   Hematocrit % 36.0* 35.9*   Platelets K/uL 235 SEE COMMENT   Gran% % 70.7 62.7   Lymph% % 13.6* 17.4*   Mono% % 13.9 15.7*   Eosinophil% % 0.6 1.7   Basophil% % 0.1 0.3   Differential Method  Automated Automated       Metabolic Panel (last 72 hours):  Recent Labs   Lab Result Units 08/03/19  1252 08/04/19  0651   Sodium mmol/L 132* 133*   Potassium mmol/L 3.9 4.3   Chloride mmol/L 87* 88*   CO2 mmol/L 27 30*   Glucose mg/dL 95 69*   BUN, Bld mg/dL 13 21*   Creatinine mg/dL 2.4* 3.3*   Albumin g/dL 2.8* 2.4*   Total Bilirubin mg/dL 1.0 0.8   Alkaline Phosphatase U/L 124 118   AST U/L 45* 38   ALT U/L 24 22   Magnesium mg/dL  --  1.9   Phosphorus mg/dL  --  5.2*       Drug  levels (last 3 results):  No results for input(s): VANCOMYCINRA, VANCOMYCINPE, VANCOMYCINTR in the last 72 hours.    Microbiologic Results:  Microbiology Results (last 7 days)     Procedure Component Value Units Date/Time    Blood culture [[id number]] Collected:  08/04/19 1400    Order Status:  Sent Specimen:  Blood from Line, Jugular, Internal Right Updated:  08/04/19 1416    Blood culture [[id number]] Collected:  08/04/19 1402    Order Status:  Sent Specimen:  Blood from Peripheral, Wrist, Right Updated:  08/04/19 1415    Culture, Respiratory with Gram Stain [[id number]]     Order Status:  No result Specimen:  Respiratory

## 2019-08-04 NOTE — ED NOTES
Telemetry Verification   Patient placed on Telemetry Box  Verified on ED monitor  Box [id number]   Monitor Tech Mohammad   Rate 71   Rhythm Normal

## 2019-08-04 NOTE — CARE UPDATE
Pt intubated with 8.0 ETT 23@ lips on documented settings with Dr Foster at bedside. No complications will continue to monitor.

## 2019-08-04 NOTE — PT/OT/SLP PROGRESS
Physical Therapy      Patient Name:  Sisi Medel   MRN:  [id number]    PT orders received and acknowledged. Prior to initiation of eval, pt transferred to ICU 2/2 hypotension and closer monitoring. Will need new PT orders when pt is medically appropriate.     Jhoana Carranza, PT

## 2019-08-04 NOTE — PROCEDURES
"Sisi Medel is a 58 y.o. female patient.    Temp: 97.6 °F (36.4 °C) (08/04/19 1500)  Pulse: 64 (08/04/19 1800)  Resp: (!) 24 (08/04/19 1800)  BP: 100/64 (08/04/19 1800)  SpO2: 100 % (08/04/19 1800)  Weight: 108.3 kg (238 lb 11.2 oz) (08/04/19 0400)  Height: 5' 6" (167.6 cm) (08/03/19 1117)       Central Line  Date/Time: 8/4/2019 6:15 PM  Location procedure was performed: [facility] CRITICAL CARE MEDICINE  Performed by: Cyrus Keita MD  Supervising provider: Rusty Marino MD  Assisting provider: Rusty Marino MD  Pre-operative Diagnosis: Poor peripheral vascular access  Post-operative diagnosis: Poor peripheral vascular access  Consent Done: Yes  Time out: Immediately prior to procedure a "time out" was called to verify the correct patient, procedure, equipment, support staff and site/side marked as required.  Indications: vascular access  Anesthesia: local infiltration, general anesthesia and see MAR for details    Anesthesia:  Local Anesthetic: lidocaine 1% without epinephrine  Anesthetic total: 2 mL  Preparation: skin prepped with ChloraPrep  Skin prep agent dried: skin prep agent completely dried prior to procedure  Sterile barriers: all five maximum sterile barriers used - cap, mask, sterile gown, sterile gloves, and large sterile sheet  Hand hygiene: hand hygiene performed prior to central venous catheter insertion  Location details: left internal jugular  Catheter type: triple lumen  Catheter size: 7 Fr  Catheter Length: 16cm    Ultrasound guidance: yes  Vessel Caliber: medium, patent, compressibility normal  Needle advanced into vessel with real time Ultrasound guidance.  Guidewire confirmed in vessel.  Image recorded and saved.  Sterile sheath used.  Manometry: Yes  Number of attempts: 2  Specimens: No  Implants: No  Post-procedure: line sutured,  sterile dressing applied,  blood return through all ports and chlorhexidine patch  Complications: No          Cyrus Keita  8/4/2019 "       General supervision provided.     Jules Robertson M.D.  [facility]

## 2019-08-04 NOTE — HOSPITAL COURSE
Admitted on 08/03 for what appeared to be heart failure exacerbation. Care escalated to ICU due to her tenuous respirashe was intubated and started on broad spectrum abx. She was unable to tolerate her HD so patient has been on CRRT. In evaluating her AMS, CTH was unremarkable and EEG was consistent with encephalopathy. Patient had worsening leukocytosis and BAL on 8/9 was positive for klebsiella. She is currently on meropenem. Due to chronic steroid use for her sarcoidosis, she is also on bactrim for PJP prophylaxis.  Frequent SAT results in significant tachypnea and agitation. On 8/10 extubation was attempted but failed so was re-intubated for increased work of breathing and tachypnea.   Pressor requirements have decreased however they still need to be raised when pt becomes more alert. On 8/16 pt had several hours during which temp fell to 94, but had resolved by the morning. On 8/18 pt pressor requirements increased. Dr. Le spoke with son regarding goals of care, in which he voiced that his mother wouldn't want to be on long term ventilator and dialysis support.  Ms. Medel continues to require pressor support, each attempt at weaning pressor support results in significant hypotension and MAPS <50.  On 8/22 family requested to start comfort care measures with plan to extubate on comfort 8/23    Date : 08/23/2019    Called to see patient for unresponsiveness. On exam the patient did not respond to verbal or physical stimuli. Absent heart and breath sounds . Absent peripheral pulses. Pupils are fixed and dilated. Patient pronounced at 17:09 Next of kin/family notified. Emotional; support and condolences provided.  called

## 2019-08-04 NOTE — PLAN OF CARE
Problem: Adult Inpatient Plan of Care  Goal: Plan of Care Review  Outcome: Ongoing (interventions implemented as appropriate)  Pt arrived to floor at 2005. Pt remain free from falls, injury, trauma. VS stable. No gtts initiated/maintained. Fall precautions reviewed and maintained. Plan to continuous BiPAP overnight, nephro consult for hemodialysis. POC reviewed with pt. Pt verbalize understanding. Will continue to monitor.

## 2019-08-04 NOTE — CARE UPDATE
Rapid Response Nurse Chart Check     Chart check completed, abnormal VS noted, bedside RN, Rhett [phone] contacted, no concerns   verbalized at this time, instructed to call [phone] for further concerns or assistance.

## 2019-08-04 NOTE — PROGRESS NOTES
Report received from RN. Pt transported to SICU [id number] with portable telemetry and BiPAP. Pt connected to ICU monitor. Critical care team called and made aware of patient arrival. New orders received and implemented. Pt assessed, immediate needs met. Pt updated on her current condition and PoC for remainder of shift. All questions answered, emotional support provided.     Admit Skin Note: Preexisting/healing ulcer to right lower calf. Dried/healing ulcerations to bilateral heels and toes. Sacrum and gluteal fold appears reddened, dry, and intact with excoriation.

## 2019-08-04 NOTE — PROGRESS NOTES
MD notified of hypotension, BP 74/47 (MAP 56), team to bedside. Poor venous access. Decision made to intubate pt and place RIJ trialysis line. Pt intubated, emergent L femoral triple lumen placed, levo and propofol gtts started. RIJ trialysis line placed shortly after, verified by x-ray.

## 2019-08-04 NOTE — H&P
[facility]  Critical Care Medicine  History & Physical    Patient Name: Sisi Medel  MRN: [id number]  Admission Date: 8/3/2019  Hospital Length of Stay: 1 days  Code Status: Full Code  Attending Physician: Jules Robertson MD   Primary Care Provider: Carlos A Caputo MD   Principal Problem: Acute on chronic respiratory failure with hypoxia and hypercapnia    Subjective:     HPI:  Ms Medel  57 yo f with ESRD on HD, cervical radiculopathy, CHF, Chronic resp failure with hypoxia, T2DM, DDD, paroxysmal Afib, seizure, shingles, thyroid disorder here for a 3 day history of cough and SOB. Patient was at dialysis and had to stop at 1.6L due to resp distress. She was put in a nonrebreather at 12L of oxygen. She was transported via EMS. Overnight she required BiPAP for worsening SOB. Rapid response was called this morning because she was hypotensive 70s/50s. Dr. Rosas, MICU team, went and recheck BP was 107/59. She later became hypertensive again and continued to be on BiPAP, she was stepped up for dialysis, BP monitoring, possible vasopressor requirement and further respiratory monitoring.     During step up she was persistently hypoglycemic and hypotensive, peripheral vascular access was lost and a left TLC was placed at bedside. It was determined she could not tolerated her normal HD and she had tenuous respiratory status, so decision was made to intubate. She has been started on broad spectrum abx, and right HD IJ catheter was placed. Nephro been made aware and SCUF planned for today.     Hospital/ICU Course:  08/04 Stepped up to ICU, intubated, started on broad spectrum abx, HD catheter placed, plan is for SCUF today.      Past Medical History:   Diagnosis Date    Anemia in ESRD (end-stage renal disease) 3/7/2016    Anticoagulant long-term use     Cervical radiculopathy 7/20/2015    CHF (congestive heart failure)     Chronic combined systolic and diastolic heart failure 6/14/2013     EF 20% , improved with Medical therapy, negative PET     Chronic respiratory failure with hypoxia 2013    On home oxygen at 2-5 liters    Closed fracture of proximal end of right fibula 2016    Complications due to renal dialysis device, implant, and graft     DDD (degenerative disc disease), lumbar 2015    Dependence on renal dialysis     Diabetes mellitus type II, controlled 2017    ESRD on hemodialysis 2016    Essential hypertension     Gout     Lateral meniscal tear 2016    Lumbar stenosis 2015    Obesity, Class III, BMI 40-49.9 (morbid obesity)     Pacemaker     Paroxysmal atrial fibrillation 2014    Not on anticoagulation    Peripheral neuropathy 2013    Personal history of gastric ulcer 3/19/2013    Pneumonia of left lower lobe due to infectious organism 3/10/2019    Sarcoidosis, lung 2009    Diagnosed in  with ocular manifestation, on 4L home O2 and PO steroids    Secondary pulmonary hypertension 2015    Seizure disorder 3/19/2013    Shingles 2013    Thyroid disease        Past Surgical History:   Procedure Laterality Date    ABDOMINAL SURGERY      ABLATION N/A 2017    Performed by [facility] MD at [facility] CATH LAB    ANGIOPLASTY Left 1/10/2018    Performed by Torrey Villafana MD at [facility] OR 2ND FLR    ANGIOPLASTY-PERCUTANEOUS TRANSLUMINAL (PTA) Left 2017    Performed by [facility]ana MD at [facility] OR 2ND FLR    ANGIOPLASTY-PERCUTANEOUS TRANSLUMINAL (PTA) Left 10/12/2016    Performed by [facility] MD at [facility] OR 2ND FLR    CARDIAC PACEMAKER PLACEMENT       SECTION      x2    DECLOT GRAFT PERCUTANEOUS Left 2017    Performed by Torrey Villafana MD at [facility] OR 2ND FLR    DECLOT-GRAFT Left 2016    Performed by Harjit Starr MD at [facility] CATH LAB    DECLOT-GRAFT Left 2016    Performed by [facility]it Starr MD at [facility] CATH LAB    ECHOCARDIOGRAM-TRANSESOPHAGEAL  N/A 6/27/2013    Performed by Delmy Surgeon at [facility] DELMY    Fistulogram Left 7/24/2019    Performed by Torrey [facility]ana MD at [facility] CATH LAB    Fistulogram Left 4/8/2019    Performed by Torrey Villafana MD at [facility] CATH LAB    fistulogram Left 1/10/2018    Performed by Torrey [facility]ana MD at [facility] OR 2ND FLR    FISTULOGRAM Left 9/13/2017    Performed by Torrey [facility]ana MD at [facility] CATH LAB    FISTULOGRAM Left 10/12/2016    Performed by Torrey [facility] MD at [facility] OR 2ND FLR    FISTULOGRAM Left 6/21/2016    Performed by Harjit Starr MD at [facility] CATH LAB    Fistulogram, LUE AVG, possible intervention Left 1/30/2019    Performed by Tor[facility]ana MD at [facility] CATH LAB    Fistulogram, OR 11 Left 5/8/2019    Performed by Torrey [facility]ana MD at [facility] OR 2ND FLR    INJECTION-STEROID-EPIDURAL-TRANSFORAMINAL Right 7/20/2015    Performed by [facility] at [facility] PAIN MGT    INJECTION-STEROID-EPIDURAL-TRANSFORAMINAL Right 5/21/2015    [facility]    [id number]-[id number] / Left upper AV graft. Left 2/3/2016    Performed by Torrey [facility] MD at [facility] OR 2ND FLR    PWXWXYIDS-QFMZOBMXJ-DFLTBLYIORBKL N/A 1/11/2017    Performed by Bladimir Adorno MD at [facility] CATH LAB    OTHER SURGICAL HISTORY      loop recorder implant    PLACEMENT-STENT Left 2/7/2017    Performed by Torrey [facility]ana MD at [facility] OR 2ND FLR    PTA, AV FISTULA N/A 7/24/2019    Performed by Tor[facility]ana MD at [facility] CATH LAB    PTA, AV FISTULA N/A 1/30/2019    Performed by Torrey Vill[facility]ana MD at [facility] CATH [facility]    stent to fistula      Stent, Fistula  7/24/2019    Performed by Torrey Villafana MD at [facility] CATH LAB    TRANSESOPHAGEAL ECHOCARDIOGRAM (JARAD) N/A 6/27/2016    Performed by Sourav Machuca MD at [facility] CATH LAB    ULTRASOUND, UPPER GI TRACT, ENDOSCOPIC N/A 1/9/2014    Performed by Stewart Burgess MD at [facility] ENDO (2ND FLR)    VASCULAR SURGERY       fistula to L upper arm        Review of patient's allergies indicates:   Allergen Reactions    Bactrim [sulfamethoxazole-trimethoprim] Other (See Comments)     Causes renal failure    Nsaids (non-steroidal anti-inflammatory drug) Other (See Comments)     Renal failure       Family History     Problem Relation (Age of Onset)    Coronary artery disease Father    Diabetes Mother, Father, Maternal Grandmother    Hypertension Mother, Father    Kidney failure Mother    Lupus Sister        Tobacco Use    Smoking status: Former Smoker     Packs/day: 0.50     Years: 10.00     Pack years: 5.00     Types: Cigarettes     Last attempt to quit: 1994     Years since quittin.3    Smokeless tobacco: Never Used   Substance and Sexual Activity    Alcohol use: No     Alcohol/week: 0.0 oz     Comment: rarely    Drug use: No    Sexual activity: Not Currently      Review of Systems   Constitutional: Positive for chills and fatigue. Negative for fever.   HENT: Positive for sore throat. Negative for congestion.    Eyes: Negative for pain and visual disturbance.   Respiratory: Positive for cough and shortness of breath.    Cardiovascular: Negative for chest pain and palpitations.   Gastrointestinal: Negative for abdominal pain, constipation, diarrhea, nausea and vomiting.   Genitourinary:        Patient does not urinate.    Musculoskeletal: Negative for back pain and myalgias.   Skin: Negative for color change and pallor.   Neurological: Negative for dizziness and headaches.   Psychiatric/Behavioral: Negative for confusion and decreased concentration.     Objective:     Vital Signs (Most Recent):  Temp: 97.9 °F (36.6 °C) (19 0900)  Pulse: 68 (19 1400)  Resp: (!) 22 (19 1400)  BP: (!) 111/55 (19 1400)  SpO2: (!) 91 % (19 1400) Vital Signs (24h Range):  Temp:  [97.1 °F (36.2 °C)-97.9 °F (36.6 °C)] 97.9 °F (36.6 °C)  Pulse:  [54-84] 68  Resp:  [18-44] 22  SpO2:  [90 %-100 %] 91 %  BP:  ()/(40-92) 111/55   Weight: 108.3 kg (238 lb 11.2 oz)  Body mass index is 38.53 kg/m².      Intake/Output Summary (Last 24 hours) at 8/4/2019 1445  Last data filed at 8/4/2019 1415  Gross per 24 hour   Intake 0 ml   Output 0 ml   Net 0 ml       Physical Exam   Constitutional: She is oriented to person, place, and time. She appears well-developed and well-nourished.   Eyes: Pupils are equal, round, and reactive to light. Conjunctivae and EOM are normal.   Neck: Normal range of motion. Neck supple.   Cardiovascular: Normal rate, regular rhythm and normal heart sounds.   Pulmonary/Chest: She is in respiratory distress. She has rales.   Crackles throughout all lung bases.    Abdominal: Soft. Bowel sounds are normal. She exhibits no distension. There is no tenderness.   Musculoskeletal: She exhibits edema.   Pitting edema bilat 1+ to mid shins    Neurological: She is alert and oriented to person, place, and time.   Skin: Skin is warm and dry.   Psychiatric: She has a normal mood and affect. Her behavior is normal. Thought content normal.   Vitals reviewed.      Vents:  Vent Mode: A/C (08/04/19 1318)  Ventilator Initiated: Yes (08/04/19 1139)  Set Rate: 22 bmp (08/04/19 1318)  Vt Set: 400 mL (08/04/19 1318)  PEEP/CPAP: 5 cmH20 (08/04/19 1318)  Oxygen Concentration (%): 98 (08/04/19 1400)  Peak Airway Pressure: 42 cmH2O (08/04/19 1318)  Plateau Pressure: 0 cmH20 (08/04/19 1318)  Total Ve: 9.12 mL (08/04/19 1318)  F/VT Ratio<105 (RSBI): (!) 52.76 (08/04/19 1318)  Lines/Drains/Airways     Central Venous Catheter Line                 Percutaneous Central Line Insertion/Assessment - triple lumen  08/04/19 1149 left femoral vein less than 1 day          Drain                 Hemodialysis AV Graft Left upper arm -- days          Airway                 Airway - Non-Surgical 08/04/19 1139 Endotracheal Tube less than 1 day          Peripheral Intravenous Line                 Peripheral IV - Single Lumen 08/03/19 1215 24 G  Right Antecubital 1 day         Peripheral IV - Single Lumen 08/04/19 1141 22 G Right Hand less than 1 day              Significant Labs:    CBC/Anemia Profile:  Recent Labs   Lab 08/03/19  1252 08/04/19  0651   WBC 7.14 5.99   HGB 10.4* 10.3*   HCT 36.0* 35.9*    SEE COMMENT   MCV 88 88   RDW 21.7* 21.5*        Chemistries:  Recent Labs   Lab 08/03/19  1252 08/04/19  0651   * 133*   K 3.9 4.3   CL 87* 88*   CO2 27 30*   BUN 13 21*   CREATININE 2.4* 3.3*   CALCIUM 8.8 8.9   ALBUMIN 2.8* 2.4*   PROT 7.4 6.9   BILITOT 1.0 0.8   ALKPHOS 124 118   ALT 24 22   AST 45* 38   MG  --  1.9   PHOS  --  5.2*           Significant Imaging: I have reviewed all pertinent imaging results/findings within the past 24 hours.    Assessment/Plan:     Neuro  Epilepsy  Continue keppra    Pulmonary  * Acute on chronic respiratory failure with hypoxia and hypercapnia  Patient presents with A fib in the ED. Patient is on chronic warfarin. S/P ablation multiple times.  -Rales and pitting edema on exam  -Did not complete dialysis yesterday, most likely this is due to volume overload    Plan  -Nephro has been contacted, plan for SCUF  -Duonebs as needed for symptomatic trx     Cardiac/Vascular  Permanent atrial fibrillation    Patient presents with A fib in the ED. Patient is on chronic warfarin. S/P ablation multiple times  Plan  -Continue warfarin  -Pacemaker in place     Acute on chronic systolic congestive heart failure  Patient has hx of HF (EF 35%). CXR in the ED suggestive of HF. BNP 1424 and troponins 0.156.  Patient is unsure if she takes her lisinopril at home.  -repeat trop 0.153  08/04 At time of step up appears volume overloaded, pitting edema and rales b/l lower lobes  -Awaiting SCUF trial to remove volume     Hypotension  On 10 mg midodrine TID at home  -Continue midodrine  -Levophed at 0.1 post intubation, will wean as tolerated  Given persistent hypotension and hypoglycemia, started on broad spectrum abx (Vanc,  Zosyn) for concern of sepsis     Renal/  ESRD on hemodialysis  -Right HD catheter placed  -Did not have full dialysis session yesterday  -Nephro onboard  -Plan for SCUF today     Immunology/Multi System  Pulmonary sarcoidosis  Continue with prednisone 20 mg        Critical Care Daily Checklist:    A: Awake: RASS Goal/Actual Goal: RASS Goal: -1-->drowsy  Actual: Horton Agitation Sedation Scale (RASS): Drowsy   B: Spontaneous Breathing Trial Performed?     C: SAT & SBT Coordinated?                        D: Delirium: CAM-ICU Overall CAM-ICU: Negative   E: Early Mobility Performed? Yes   F: Feeding Goal:    Status:     Current Diet Order   Procedures    Diet renal      AS: Analgesia/Sedation Propofol   T: Thromboembolic Prophylaxis On warfarin   H: HOB > 300 Yes   U: Stress Ulcer Prophylaxis (if needed) Protonix   G: Glucose Control Will monitor   B: Bowel Function     I: Indwelling Catheter (Lines & Yepez) Necessity HD line today   D: De-escalation of Antimicrobials/Pharmacotherapies VancPipern Day 0    Plan for the day/ETD     Code Status:  Family/Goals of Care: Full Code         Critical secondary to Patient has a condition that poses threat to life and bodily function: Severe Respiratory Distress     Critical care was time spent personally by me on the following activities: development of treatment plan with patient or surrogate and bedside caregivers, discussions with consultants, evaluation of patient's response to treatment, examination of patient, ordering and performing treatments and interventions, ordering and review of laboratory studies, ordering and review of radiographic studies, pulse oximetry, re-evaluation of patient's condition. This critical care time did not overlap with that of any other provider or involve time for any procedures.     Rusty Marino MD  Critical Care Medicine  [facility]

## 2019-08-04 NOTE — SUBJECTIVE & OBJECTIVE
Past Medical History:   Diagnosis Date    Anemia in ESRD (end-stage renal disease) 3/7/2016    Anticoagulant long-term use     Cervical radiculopathy 2015    CHF (congestive heart failure)     Chronic combined systolic and diastolic heart failure 2013    EF 20% , improved with Medical therapy, negative PET     Chronic respiratory failure with hypoxia 2013    On home oxygen at 2-5 liters    Closed fracture of proximal end of right fibula 2016    Complications due to renal dialysis device, implant, and graft     DDD (degenerative disc disease), lumbar 2015    Dependence on renal dialysis     Diabetes mellitus type II, controlled 2017    ESRD on hemodialysis 2016    Essential hypertension     Gout     Lateral meniscal tear 2016    Lumbar stenosis 2015    Obesity, Class III, BMI 40-49.9 (morbid obesity)     Pacemaker     Paroxysmal atrial fibrillation 2014    Not on anticoagulation    Peripheral neuropathy 2013    Personal history of gastric ulcer 3/19/2013    Pneumonia of left lower lobe due to infectious organism 3/10/2019    Sarcoidosis, lung 2009    Diagnosed in  with ocular manifestation, on 4L home O2 and PO steroids    Secondary pulmonary hypertension 2015    Seizure disorder 3/19/2013    Shingles 2013    Thyroid disease        Past Surgical History:   Procedure Laterality Date    ABDOMINAL SURGERY      ABLATION N/A 2017    Performed by [facility] at [facility] CATH LAB    ANGIOPLASTY Left 1/10/2018    Performed by Tor[facility] MD at [facility] OR 2ND FLR    ANGIOPLASTY-PERCUTANEOUS TRANSLUMINAL (PTA) Left 2017    Performed by [facility] MD at [facility] OR 2ND FLR    ANGIOPLASTY-PERCUTANEOUS TRANSLUMINAL (PTA) Left 10/12/2016    Performed by [facility]ana MD at [facility] OR 2ND FLR    CARDIAC PACEMAKER PLACEMENT       SECTION      x2    DECLOT GRAFT PERCUTANEOUS Left  2/7/2017    Performed by [facility] MD at [facility] OR 2ND FLR    DECLOT-GRAFT Left 6/21/2016    Performed by Harjit Starr MD at [facility] CATH LAB    DECLOT-GRAFT Left 6/20/2016    Performed by Harjit Starr MD at [facility] CATH LAB    ECHOCARDIOGRAM-TRANSESOPHAGEAL N/A 6/27/2013    Performed by Delmy Surgeon at [facility] DELMY    Fistulogram Left 7/24/2019    Performed by Torrey Villafana MD at [facility] CATH LAB    Fistulogram Left 4/8/2019    Performed by Torrey Villafana MD at [facility] CATH LAB    fistulogram Left 1/10/2018    Performed by Torrey Villafana MD at [facility] OR 2ND FLR    FISTULOGRAM Left 9/13/2017    Performed by Torrey Villafana MD at [facility] CATH LAB    FISTULOGRAM Left 10/12/2016    Performed by Torrey Villafana MD at [facility] OR 2ND FLR    FISTULOGRAM Left 6/21/2016    Performed by Harjit Starr MD at [facility] CATH LAB    Fistulogram, LUE AVG, possible intervention Left 1/30/2019    Performed by Torrey Villafana MD at [facility] CATH LAB    Fistulogram, OR 11 Left 5/8/2019    Performed by Torrey Villafana MD at [facility] OR 2ND FLR    INJECTION-STEROID-EPIDURAL-TRANSFORAMINAL Right 7/20/2015    Performed by [facility] MD at [facility] PAIN MGT    INJECTION-STEROID-EPIDURAL-TRANSFORAMINAL Right 5/21/2015    Performed by Patria Gut[facility]rez MD at [facility] PAIN MGT    [id number]-[id number] / Left upper AV graft. Left 2/3/2016    Performed by Torrey Villafana MD at [facility] OR 2ND FLR    [id number] N/A 1/11/2017    Performed by Bladimir Adorno MD at [facility] CATH LAB    OTHER SURGICAL HISTORY      loop recorder implant    PLACEMENT-STENT Left 2/7/2017    Performed by Torrey Villafana MD at [facility] OR 2ND FLR    PTA, AV FISTULA N/A 7/24/2019    Performed by Torrey Villafana MD at [facility] CATH LAB    PTA, AV FISTULA N/A 1/30/2019    Performed by Torrey Villafana MD at [facility] CATH LAB    stent to fistula      Stent, Fistula  7/24/2019    Performed by Torrey Vill[facility]ana,  MD at [facility] CATH LAB    TRANSESOPHAGEAL ECHOCARDIOGRAM (JARAD) N/A 2016    Performed by Sourav Machuca MD at [facility] CATH LAB    ULTRASOUND, UPPER GI TRACT, ENDOSCOPIC N/A 2014    Performed by Stewart Burgess MD at [facility] ENDO (2ND FLR)    VASCULAR SURGERY      fistula to L upper arm        Review of patient's allergies indicates:   Allergen Reactions    Bactrim [sulfamethoxazole-trimethoprim] Other (See Comments)     Causes renal failure    Nsaids (non-steroidal anti-inflammatory drug) Other (See Comments)     Renal failure       Family History     Problem Relation (Age of Onset)    Coronary artery disease Father    Diabetes Mother, Father, Maternal Grandmother    Hypertension Mother, Father    Kidney failure Mother    Lupus Sister        Tobacco Use    Smoking status: Former Smoker     Packs/day: 0.50     Years: 10.00     Pack years: 5.00     Types: Cigarettes     Last attempt to quit: 1994     Years since quittin.3    Smokeless tobacco: Never Used   Substance and Sexual Activity    Alcohol use: No     Alcohol/week: 0.0 oz     Comment: rarely    Drug use: No    Sexual activity: Not Currently      Review of Systems   Constitutional: Positive for chills and fatigue. Negative for fever.   HENT: Positive for sore throat. Negative for congestion.    Eyes: Negative for pain and visual disturbance.   Respiratory: Positive for cough and shortness of breath.    Cardiovascular: Negative for chest pain and palpitations.   Gastrointestinal: Negative for abdominal pain, constipation, diarrhea, nausea and vomiting.   Genitourinary:        Patient does not urinate.    Musculoskeletal: Negative for back pain and myalgias.   Skin: Negative for color change and pallor.   Neurological: Negative for dizziness and headaches.   Psychiatric/Behavioral: Negative for confusion and decreased concentration.     Objective:     Vital Signs (Most Recent):  Temp: 97.9 °F (36.6 °C) (19 0900)  Pulse: 68 (19  1400)  Resp: (!) 22 (08/04/19 1400)  BP: (!) 111/55 (08/04/19 1400)  SpO2: (!) 91 % (08/04/19 1400) Vital Signs (24h Range):  Temp:  [97.1 °F (36.2 °C)-97.9 °F (36.6 °C)] 97.9 °F (36.6 °C)  Pulse:  [54-84] 68  Resp:  [18-44] 22  SpO2:  [90 %-100 %] 91 %  BP: ()/(40-92) 111/55   Weight: 108.3 kg (238 lb 11.2 oz)  Body mass index is 38.53 kg/m².      Intake/Output Summary (Last 24 hours) at 8/4/2019 1445  Last data filed at 8/4/2019 1415  Gross per 24 hour   Intake 0 ml   Output 0 ml   Net 0 ml       Physical Exam   Constitutional: She is oriented to person, place, and time. She appears well-developed and well-nourished.   Eyes: Pupils are equal, round, and reactive to light. Conjunctivae and EOM are normal.   Neck: Normal range of motion. Neck supple.   Cardiovascular: Normal rate, regular rhythm and normal heart sounds.   Pulmonary/Chest: She is in respiratory distress. She has rales.   Crackles throughout all lung bases.    Abdominal: Soft. Bowel sounds are normal. She exhibits no distension. There is no tenderness.   Musculoskeletal: She exhibits edema.   Pitting edema bilat 1+ to mid shins    Neurological: She is alert and oriented to person, place, and time.   Skin: Skin is warm and dry.   Psychiatric: She has a normal mood and affect. Her behavior is normal. Thought content normal.   Vitals reviewed.      Vents:  Vent Mode: A/C (08/04/19 1318)  Ventilator Initiated: Yes (08/04/19 1139)  Set Rate: 22 bmp (08/04/19 1318)  Vt Set: 400 mL (08/04/19 1318)  PEEP/CPAP: 5 cmH20 (08/04/19 1318)  Oxygen Concentration (%): 98 (08/04/19 1400)  Peak Airway Pressure: 42 cmH2O (08/04/19 1318)  Plateau Pressure: 0 cmH20 (08/04/19 1318)  Total Ve: 9.12 mL (08/04/19 1318)  F/VT Ratio<105 (RSBI): (!) 52.76 (08/04/19 1318)  Lines/Drains/Airways     Central Venous Catheter Line                 Percutaneous Central Line Insertion/Assessment - triple lumen  08/04/19 1149 left femoral vein less than 1 day          Drain                  Hemodialysis AV Graft Left upper arm -- days          Airway                 Airway - Non-Surgical 08/04/19 1139 Endotracheal Tube less than 1 day          Peripheral Intravenous Line                 Peripheral IV - Single Lumen 08/03/19 1215 24 G Right Antecubital 1 day         Peripheral IV - Single Lumen 08/04/19 1141 22 G Right Hand less than 1 day              Significant Labs:    CBC/Anemia Profile:  Recent Labs   Lab 08/03/19  1252 08/04/19  0651   WBC 7.14 5.99   HGB 10.4* 10.3*   HCT 36.0* 35.9*    SEE COMMENT   MCV 88 88   RDW 21.7* 21.5*        Chemistries:  Recent Labs   Lab 08/03/19  1252 08/04/19  0651   * 133*   K 3.9 4.3   CL 87* 88*   CO2 27 30*   BUN 13 21*   CREATININE 2.4* 3.3*   CALCIUM 8.8 8.9   ALBUMIN 2.8* 2.4*   PROT 7.4 6.9   BILITOT 1.0 0.8   ALKPHOS 124 118   ALT 24 22   AST 45* 38   MG  --  1.9   PHOS  --  5.2*           Significant Imaging: I have reviewed all pertinent imaging results/findings within the past 24 hours.

## 2019-08-04 NOTE — CARE UPDATE
Nephrology Care Update    Patient required line placement, and unfortunately desat requiring vasopressors and intubation, now on mechanical ventilation.    Will provide SCUF for volume management and reassess tomorrow.    Discussed with staff.    Colin Reynoso MD  Nephrology  [facility]

## 2019-08-04 NOTE — ASSESSMENT & PLAN NOTE
Patient presents with A fib in the ED. Patient is on chronic warfarin. S/P ablation multiple times  Plan  -Continue warfarin  -Pacemaker in place

## 2019-08-04 NOTE — PROGRESS NOTES
Parent MD HOLGUIN at bedside, ordered to call respiratory stat to set up continuous BiPAP overnight. Paged Respiratory and notified of stat order. Will continue to monitor.

## 2019-08-04 NOTE — ASSESSMENT & PLAN NOTE
On 10 mg midodrine TID at home  -Continue midodrine  -Levophed at 0.1 post intubation, will wean as tolerated  Given persistent hypotension and hypoglycemia, started on broad spectrum abx (Vanc, Zosyn) for concern of sepsis

## 2019-08-04 NOTE — CONSULTS
[facility]  Nephrology  Consult Note    Patient Name: Sisi Medel  MRN: [id number]  Admission Date: 8/3/2019  Hospital Length of Stay: 1 days  Attending Provider: Jules Robertson MD   Primary Care Physician: Carlos A Caputo MD  Principal Problem:Acute on chronic respiratory failure with hypoxia and hypercapnia    Inpatient consult to Nephrology  Consult performed by: Grant Gomes MD  Consult ordered by: Lupillo Cornejo MD        Subjective:     HPI: 59 y/o Black or  woman with PMHx of pulmonary sarcoidosis on 3-5L home oxygen, Class 3 HFrEF (3/2019 EF 35%), perm Afib/flutter s/p 6/2017 AVN ablation & PPM, ESRD (TTS), Type 2 DM (3/2019 A1c 6.2%), pulmonary HTN, YOANDY, and orthostatic hypotension admitted to [facility] after presenting with SOB after completing her HD session on Saturday, 8/3/2019.  She could not complete her treatment, stopped at 1.6L removal because she developed respiratory distress, placed on nonrebreather mask and transferred here for further workup.     Nephrology consulted for management of ESRD and HD treatments.     Outpatient HD unit: [facility]   -Nephrologist: Patient does not know  -HD tx days: TThS  -HD tx time: 4hrs 45mins    -HD access: LUE AVF  -HD modality: iHD  -Residual urine: Minimal  -EDW: 109 kg    Past Medical History:   Diagnosis Date    Anemia in ESRD (end-stage renal disease) 3/7/2016    Anticoagulant long-term use     Cervical radiculopathy 7/20/2015    CHF (congestive heart failure)     Chronic combined systolic and diastolic heart failure 6/14/2013    EF 20% 2013, improved with Medical therapy, negative PET 2013    Chronic respiratory failure with hypoxia 04/22/2013    On home oxygen at 2-5 liters    Closed fracture of proximal end of right fibula 6/27/2016    Complications due to renal dialysis device, implant, and graft     DDD (degenerative disc disease), lumbar 6/17/2015    Dependence on  renal dialysis     Diabetes mellitus type II, controlled 2017    ESRD on hemodialysis 2016    Essential hypertension     Gout     Lateral meniscal tear 2016    Lumbar stenosis 2015    Obesity, Class III, BMI 40-49.9 (morbid obesity)     Pacemaker     Paroxysmal atrial fibrillation 2014    Not on anticoagulation    Peripheral neuropathy 2013    Personal history of gastric ulcer 3/19/2013    Pneumonia of left lower lobe due to infectious organism 3/10/2019    Sarcoidosis, lung 2009    Diagnosed in  with ocular manifestation, on 4L home O2 and PO steroids    Secondary pulmonary hypertension 2015    Seizure disorder 3/19/2013    Shingles 2013    Thyroid disease        Past Surgical History:   Procedure Laterality Date    ABDOMINAL SURGERY      ABLATION N/A 2017    Performed by Bladimir Adorno MD at [facility] CATH LAB    ANGIOPLASTY Left 1/10/2018    Performed by Torrey Villafana MD at [facility] OR 2ND FLR    ANGIOPLASTY-PERCUTANEOUS TRANSLUMINAL (PTA) Left 2017    Performed by Torrey [facility]afana MD at [facility] OR [facility] FLR    ANGIOPLASTY-PERCUTANEOUS TRANSLUMINAL (PTA) Left 10/12/2016    Performed by Torrey Villafana MD at [facility] OR 2ND FLR    CARDIAC PACEMAKER PLACEMENT       SECTION      x2    DECLOT GRAFT PERCUTANEOUS Left 2017    Performed by Torrey Villafana MD at [facility] OR 2ND FLR    DECLOT-GRAFT Left 2016    Performed by Harjit Starr MD at [facility] CATH LAB    DECLOT-GRAFT Left 2016    Performed by Harjit Starr MD at [facility] CATH LAB    ECHOCARDIOGRAM-TRANSESOPHAGEAL N/A 2013    Performed by Delmy Surgeon at [facility] DELMY    Fistulogram Left 2019    Performed by Torrey Villafana MD at [facility] CATH LAB    Fistulogram Left 2019    Performed by Torrey Villafana MD at [facility] CATH LAB    fistulogram Left 1/10/2018    Performed by Torrey Villafana MD at [facility] OR 2ND FLR    FISTULOGRAM Left 2017     Performed by [facility] MD at [facility] CATH LAB    FISTULOGRAM Left 10/12/2016    Performed by Torrey Villafana MD at [facility] OR 2ND FLR    FISTULOGRAM Left 6/21/2016    Performed by Harjit Starr MD at [facility] CATH LAB    Fistulogram, LUE AVG, possible intervention Left 1/30/2019    Performed by Torrey Villafana MD at [facility] CATH LAB    Fistulogram, OR 11 Left 5/8/2019    Performed by Torrey Villafana MD at [facility] OR 2ND FLR    INJECTION-STEROID-EPIDURAL-TRANSFORAMINAL Right 7/20/2015    Performed by Patria Gutierrez MD at [facility] PAIN MGT    INJECTION-STEROID-EPIDURAL-TRANSFORAMINAL Right 5/21/2015    Performed by [facility] MD at [facility] MGT    PAHFLHFZO-MIDTX-ESSOWDLNHWMKK / Left upper AV graft. Left 2/3/2016    Performed by Torrey Villafana MD at [facility] OR 2ND FLR    HXPBYHCYA-QGNFVOCTG-USKMFSTXATNYU N/A 1/11/2017    Performed by Bladimir Adorno MD at [facility] CATH LAB    OTHER SURGICAL HISTORY      loop recorder implant    PLACEMENT-STENT Left 2/7/2017    Performed by Torrey Villafana MD at [facility] OR 2ND FLR    PTA, AV FISTULA N/A 7/24/2019    Performed by Torrey Villafana MD at [facility] CATH LAB    PTA, AV FISTULA N/A 1/30/2019    Performed by Torrey Villafana MD at [facility] CATH LAB    stent to fistula      Stent, Fistula  7/24/2019    Performed by Torrey Villafana MD at [facility] CATH LAB    TRANSESOPHAGEAL ECHOCARDIOGRAM (JARAD) N/A 6/27/2016    Performed by Sourav Machuca MD at [facility] CATH LAB    ULTRASOUND, UPPER GI TRACT, ENDOSCOPIC N/A 1/9/2014    Performed by Stewart Burgess MD at [facility] ENDO (2ND FLR)    VASCULAR SURGERY      fistula to L upper arm        Review of patient's allergies indicates:   Allergen Reactions    Bactrim [sulfamethoxazole-trimethoprim] Other (See Comments)     Causes renal failure    Nsaids (non-steroidal anti-inflammatory drug) Other (See Comments)     Renal failure     Current Facility-Administered Medications   Medication Frequency    0.9%  NaCl  infusion Once    acetaminophen tablet 650 mg Q4H PRN    albumin human 25% bottle 25 g Once    albuterol-ipratropium 2.5 mg-0.5 mg/3 mL nebulizer solution 3 mL Q6H PRN    cinacalcet tablet 30 mg Daily with breakfast    clopidogrel tablet 75 mg Daily    dextrose 10% (D10W) Bolus PRN    dextrose 10% (D10W) Bolus PRN    dextrose 50% injection 25 g PRN    fludrocortisone tablet 200 mcg BID    glucagon (human recombinant) injection 1 mg PRN    glucose chewable tablet 16 g PRN    glucose chewable tablet 24 g PRN    HYDROcodone-acetaminophen 5-325 mg per tablet 1 tablet Q6H PRN    hydrocortisone sodium succinate injection 50 mg Q8H    insulin aspart U-100 pen 0-5 Units QID (AC + HS) PRN    levETIRAcetam tablet 500 mg BID    LORazepam tablet 0.5 mg Q8H PRN    miconazole NITRATE 2 % top powder BID    midodrine tablet 10 mg TID    midodrine tablet 10 mg Once    norepinephrine 4 mg in dextrose 5% 250 mL infusion (premix) (titrating) Continuous    nortriptyline capsule 25 mg Nightly    ondansetron disintegrating tablet 8 mg Q8H PRN    pantoprazole EC tablet 40 mg Daily    phenylephrine HCl in 0.9% NaCl 1 mg/10 mL (100 mcg/mL) syringe     prednisoLONE acetate 1 % ophthalmic suspension 1 drop TID    propofol (DIPRIVAN) 10 mg/mL infusion Continuous    senna-docusate 8.6-50 mg per tablet 1 tablet BID    sevelamer carbonate tablet 800 mg Daily with dinner    sodium chloride 0.9% flush 10 mL PRN    tiZANidine tablet 4 mg Nightly PRN    warfarin (COUMADIN) tablet 2.5 mg Daily     Facility-Administered Medications Ordered in Other Encounters   Medication Frequency    ceFAZolin injection 2 g On Call Procedure    lidocaine (PF) 10 mg/ml (1%) injection 10 mg Once    mupirocin 2 % ointment On Call Procedure    sodium chloride 0.9% flush 10 mL PRN     Family History     Problem Relation (Age of Onset)    Coronary artery disease Father    Diabetes Mother, Father, Maternal Grandmother    Hypertension Mother,  Father    Kidney failure Mother    Lupus Sister        Tobacco Use    Smoking status: Former Smoker     Packs/day: 0.50     Years: 10.00     Pack years: 5.00     Types: Cigarettes     Last attempt to quit: 1994     Years since quittin.3    Smokeless tobacco: Never Used   Substance and Sexual Activity    Alcohol use: No     Alcohol/week: 0.0 oz     Comment: rarely    Drug use: No    Sexual activity: Not Currently     Review of Systems   Constitutional: Positive for chills and fatigue. Negative for fever.   HENT: Positive for sore throat. Negative for congestion.    Eyes: Positive for pain. Negative for visual disturbance.   Respiratory: Positive for cough and shortness of breath.    Cardiovascular: Positive for chest pain and palpitations.   Gastrointestinal: Positive for nausea. Negative for abdominal pain, constipation, diarrhea and vomiting.   Genitourinary:        Patient does not urinate.    Musculoskeletal: Negative for back pain and myalgias.   Skin: Negative for color change and pallor.   Neurological: Positive for dizziness and headaches.   Psychiatric/Behavioral: Negative for confusion and decreased concentration.     Objective:     Vital Signs (Most Recent):  Temp: 97.9 °F (36.6 °C) (19 0900)  Pulse: 66 (19 1230)  Resp: (!) 30 (19 1230)  BP: (!) 81/52 (19 1230)  SpO2: 100 % (19 1230)  O2 Device (Oxygen Therapy): ventilator (19 1200) Vital Signs (24h Range):  Temp:  [97.1 °F (36.2 °C)-97.9 °F (36.6 °C)] 97.9 °F (36.6 °C)  Pulse:  [65-84] 66  Resp:  [18-44] 30  SpO2:  [90 %-100 %] 100 %  BP: ()/(40-92) 81/52     Weight: 108.3 kg (238 lb 11.2 oz) (19 0400)  Body mass index is 38.53 kg/m².  Body surface area is 2.25 meters squared.    No intake/output data recorded.    Physical Exam   Constitutional: She is oriented to person, place, and time. She appears well-developed and well-nourished.   HENT:   Head: Normocephalic.   Eyes: Pupils are equal,  round, and reactive to light. Conjunctivae and EOM are normal.   Neck: Normal range of motion. Neck supple.   Cardiovascular: Normal rate, regular rhythm and normal heart sounds.   Pulmonary/Chest: She is in respiratory distress. She has rales.   Crackles throughout all lung bases.    Abdominal: Soft. Bowel sounds are normal. She exhibits no distension. There is no tenderness.   Musculoskeletal: She exhibits edema.   Pitting edema bilat to shins   Neurological: She is alert and oriented to person, place, and time.   Skin: Skin is warm and dry.   Psychiatric: She has a normal mood and affect. Her behavior is normal. Thought content normal.   Nursing note and vitals reviewed.      Significant Labs:  CBC:   Recent Labs   Lab 08/04/19  0651   WBC 5.99   RBC 4.06   HGB 10.3*   HCT 35.9*   PLT SEE COMMENT   MCV 88   MCH 25.4*   MCHC 28.7*     CMP:   Recent Labs   Lab 08/04/19  0651   GLU 69*   CALCIUM 8.9   ALBUMIN 2.4*   PROT 6.9   *   K 4.3   CO2 30*   CL 88*   BUN 21*   CREATININE 3.3*   ALKPHOS 118   ALT 22   AST 38   BILITOT 0.8     All labs within the past 24 hours have been reviewed.    Significant Imaging:  CXR personally reviewed.    Assessment/Plan:     ESRD on hemodialysis  Outpatient HD unit: [facility]   -Nephrologist: Patient does not know  -HD tx days: TThS  -HD tx time: 4hrs 45mins    -HD access: LUE AVG  -HD modality: iHD  -Residual urine: Minimal  -EDW: 109 kg     Plan:  - UF session today for 2L removal, and HD tomorrow for metabolic clearance and more volume removal  - Midodrine 10 mg and albumin 25 gms for blood pressure support  - Strict I/Os and chart  - Neuro checks by primary team  - MAP > 65  - Hb > 7 gm/dL, to target 10 gm/dL for ESRD/CKD patients  - Will follow closely        Thank you for your consult. I will follow-up with patient. Please contact us if you have any additional questions.    Grant Gomes MD  Nephrology  [facility]

## 2019-08-04 NOTE — ASSESSMENT & PLAN NOTE
Patient has a hx of ESRD on HD and CHF. This is likely fluid overload due to a combination of these diagnoses. BNP 1464 and troponins 0.156. Patient on 5L of O2 at home.    Plan  - consulted nephro for HD  - duo nebs for symptomatic tx  - trend troponins

## 2019-08-04 NOTE — PROGRESS NOTES
Nani Sullivan called about BP 86/54. Discussed administration of scheduled midodrine 10mg po and will recheck BP in 30 min. Recheck /59. Notified Nani Sullivan. Will continue to monitor.

## 2019-08-04 NOTE — PROGRESS NOTES
CRRT started , good blood flow from Temp catheter right IJ.   */SEQ Scuff.  Patient intubated and on vent.  B/p 103/61 pulse pulse pace at 69.  Patient is a chronic HD pat , is on crrt scuff now .

## 2019-08-04 NOTE — PT/OT/SLP PROGRESS
Occupational Therapy      Patient Name:  Sisi Medel   MRN:  [id number]    Patient not seen today secondary orders received 8/3/19 and pt transferred to ICU prior to OT evaluation due to hypotension and respiratory failure. Orders discontinued and pt will require new orders when appropriate for therapy.    BALTA Moore  8/4/2019

## 2019-08-04 NOTE — ASSESSMENT & PLAN NOTE
Outpatient HD unit: [facility]   -Nephrologist: Patient does not know  -HD tx days: TThS  -HD tx time: 4hrs 45mins    -HD access: LUE AVG  -HD modality: iHD  -Residual urine: Minimal  -EDW: 109 kg     Plan:  - UF session today for 2L removal, and HD tomorrow for metabolic clearance and more volume removal  - Midodrine 10 mg and albumin 25 gms for blood pressure support  - Strict I/Os and chart  - Neuro checks by primary team  - MAP > 65  - Hb > 7 gm/dL, to target 10 gm/dL for ESRD/CKD patients  - Will follow closely

## 2019-08-04 NOTE — PROGRESS NOTES
Transfer from [facility]    Transfer via hospital bed, on tele, pulse ox in place, on continuous Bipap    Transfer with pt belongings  Transported by SRN and primary nurse    Medicines sent: none    Chart send with patient:yes    Notified: pt notified family, Kenan, Sr

## 2019-08-04 NOTE — PROGRESS NOTES
530  Hypotensive (70s/50s manual). Pt noticeable drowsy but no other symptoms. Notified Yari RN Charge nurse and Nani RN Rapid. Yari and Nani came to bedside, recheck BP 70s/50s. Notified primary team IM5. Primary Team IM5 stated will call ICU and will come to bedside. No further orders given.      0550 Alison SHAFER and Primary Team IM5 at bedside. BP recheck 107/59. Alison SHAFER ordered to keep pt on floor, no ICU transfer. Cycle BP Q15min. SBP remain >100.     Will continue to monitor.

## 2019-08-04 NOTE — HPI
Ms Medel  59 yo f with ESRD on HD, cervical radiculopathy, CHF, Chronic resp failure with hypoxia, T2DM, DDD, paroxysmal Afib, seizure, shingles, thyroid disorder here for a 3 day history of cough and SOB. Patient was at dialysis and had to stop at 1.6L due to resp distress. She was put in a nonrebreather at 12L of oxygen. She was transported via EMS. Overnight she required BiPAP for worsening SOB. Rapid response was called this morning because she was hypotensive 70s/50s. Dr. Rosas, MICU team, went and recheck BP was 107/59. She later became hypertensive again and continued to be on BiPAP, she was stepped up for dialysis, BP monitoring, possible vasopressor requirement and further respiratory monitoring.     During step up she was persistently hypoglycemic and hypotensive, peripheral vascular access was lost and a left TLC was placed at bedside. It was determined she could not tolerated her normal HD and she had tenuous respiratory status, so decision was made to intubate. She has been started on broad spectrum abx, and right HD IJ catheter was placed. Nephro been made aware and SCUF planned for today.

## 2019-08-04 NOTE — CARE UPDATE
"RAPID RESPONSE NURSE PROACTIVE ROUNDING NOTE     Time of Visit: 0545    Admit Date: 8/3/2019  LOS: 1  Code Status: Full Code   Date of Visit: 2019  : 1960  Age: 58 y.o.  Sex: female  Race: Black or   Bed: 305/305 A:   MRN: [id number]  Was the patient discharged from an ICU this admission? no   Was the patient discharged from a PACU within last 24 hours?  no  Did the patient receive conscious sedation/general anesthesia in last 24 hours?  no  Was the patient in the ED within the past 24 hours?  yes  Was the patient started on NIPPV within the past 24 hours?  yes  Attending Physician: Radha Lockett MD  Primary Service: Networked reference to record PCT     ASSESSMENT     Diagnosis: Acute on chronic respiratory failure with hypoxia    Abnormal Vital Signs: BP (!) 78/56 (BP Location: Right arm, Patient Position: Lying)   Pulse 68   Temp 97.5 °F (36.4 °C) (Oral)   Resp (!) 26   Ht 5' 6" (1.676 m)   Wt 108.3 kg (238 lb 11.2 oz)   LMP  (LMP Unknown)   SpO2 99%   Breastfeeding? No   BMI 38.53 kg/m²      Clinical Issues: Circulatory    Patient  has a past medical history of Anemia in ESRD (end-stage renal disease), Anticoagulant long-term use, Cervical radiculopathy, CHF (congestive heart failure), Chronic combined systolic and diastolic heart failure, Chronic respiratory failure with hypoxia, Closed fracture of proximal end of right fibula, Complications due to renal dialysis device, implant, and graft, DDD (degenerative disc disease), lumbar, Dependence on renal dialysis, Diabetes mellitus type II, controlled, ESRD on hemodialysis, Essential hypertension, Gout, Lateral meniscal tear, Lumbar stenosis, Obesity, Class III, BMI 40-49.9 (morbid obesity), Pacemaker, Paroxysmal atrial fibrillation, Peripheral neuropathy, Personal history of gastric ulcer, Pneumonia of left lower lobe due to infectious organism, Sarcoidosis, lung, Secondary pulmonary hypertension, Seizure disorder, Shingles, " and Thyroid disease.    Called to bedside by primary nurse, Rhett, for hypotension (76/50). Patient on continuous BiPap since admission to [facility]. Upon assessment, patient is drowsy but arousable to voice, AAOx4, afebrile. BP per manual cuff by RRN 78/58.        Primary team aware and at bedside. Adarsh with CCM at bedside. Patient temporarily taken off bipap by MD to assess mental status - patient only able to tolerate 1 minute off BiPap before feeling like she couldn't breath with tachypnea and nasal flaring. MD adjusted BiPap settings (15/8/30%). SpO2 193%. /60.    INTERVENTIONS/ RECOMMENDATIONS     SpO2 goal 88-92%. Monitor BP closely for hypotension - Midodrine due at 0900. Concerning for patient's inability to be off BiPap- Monitor for respiratory distress and oxygen desaturation.     Discussed plan of care with RNRhett.    PHYSICIAN ESCALATION     Yes/No  yes    Orders received and case discussed with Dr. Altamirano and primry team.    Disposition: Remain in room 305A.    FOLLOW-UP     Call back the Rapid Response Nurse, Nani Velasquez RN at [phone] for additional questions or concerns.

## 2019-08-04 NOTE — PROCEDURES
[facility]   Procedure Note-  8/4/2019                 Procedure- Right femoral central line placement   Indication-Respiratory failure. Loss of IV access, intubated.   Location- [facility]      Informed consent--Emergent situation. Intubated and loss of IV access with inability to re-establish.      Procedure in Detail-- Done under semi sterile conditions given emergent need for access..Site prepped.  Placed in the supine position.  10 cc 1% lidocaine inserted locally for anesthesia. Under ultrasound guidance the left femoral vein was visualized and needle introduced under direct imaging. Guidewire placed.. Skin incision, dilation and catheter insertion via Seldinger technique. All ports flush and withdraw. Line sutured in place.      Attempts- 1       Complications- No immediate complications, tolerated procedure well      Plan is to place additional sterile central venous access and to remove this line given the limited sterility and emergent nature of procedure        Jules Robertson M.D.   [facility]

## 2019-08-05 NOTE — PROGRESS NOTES
Left femoral access removed, per MD.  Direct pressure held s/p removal and gauze/occlusive dressing placed; clean/dry/intact. No complications, patient tolerated well.

## 2019-08-05 NOTE — PROCEDURES
"Sisi Medel is a 58 y.o. female patient.    Temp: 97.6 °F (36.4 °C) (19 0300)  Pulse: 70 (19 0400)  Resp: (!) 24 (19 0400)  BP: 105/71 (19 0400)  SpO2: 100 % (19 0400)  Weight: 108.3 kg (238 lb 11.2 oz) (19 0400)  Height: 5' 6" (167.6 cm) (19 1117)       Arterial Line  Date/Time: 2019 4:05 AM  Location procedure was performed: [facility] CRITICAL CARE MEDICINE  Performed by: Jaja Weeks MD  Authorized by: Jaja Weeks MD   Pre-op Diagnosis: acute on chronic hypoxic hypercapneic respiratory failure  Post-operative diagnosis: acute on chronic hypoxic hypercapenic respiratory failure  Consent Done: Yes  Consent: Written consent obtained.  Risks and benefits: risks, benefits and alternatives were discussed  Procedure consent: procedure consent matches procedure scheduled  Relevant documents: relevant documents present and verified  Site marked: the operative site was marked  Imaging studies: imaging studies available  Patient identity confirmed: , MRN and name  Time out: Immediately prior to procedure a "time out" was called to verify the correct patient, procedure, equipment, support staff and site/side marked as required.  Preparation: Patient was prepped and draped in the usual sterile fashion.  Indications: hemodynamic monitoring  Location: right radial  Anesthesia method: patient intubated on sedatives.  Patient sedated: yes  Sedation type: deep sedation  (See MAR for exact dosages of medications).  Sedatives: propofol (for intubation)  Vitals: Vital signs were monitored during sedation.  Needle gauge: 22  Seldinger technique: Seldinger technique used  Number of attempts: 2  Complications: No  Estimated blood loss (mL): 5  Specimens: No  Implants: No  Post-procedure: line sutured and dressing applied  Patient tolerance: Patient tolerated the procedure well with no immediate complications          Jaja Weeks  2019  "

## 2019-08-05 NOTE — NURSING
Dr. Kohler at bedside and aware of hypothermia, low Na.  Orders to modify BG check to q4h and to wean O2 for sat > 92%.  Will continue to monitor.

## 2019-08-05 NOTE — SUBJECTIVE & OBJECTIVE
Interval History/Significant Events: SCUF started in the AM, levo requirement of 0.06. Still intubated. Plan to transition to SLED. Blood cultures negative to date, covered with Maira and Soraida     Review of Systems   Unable to perform ROS: Intubated     Objective:     Vital Signs (Most Recent):  Temp: (!) 92.7 °F (33.7 °C) (08/05/19 1357)  Pulse: 69 (08/05/19 1400)  Resp: (!) 23 (08/05/19 0740)  BP: 102/64 (08/05/19 1400)  SpO2: 100 % (08/05/19 1400) Vital Signs (24h Range):  Temp:  [87.3 °F (30.7 °C)-97.8 °F (36.6 °C)] 92.7 °F (33.7 °C)  Pulse:  [] 69  Resp:  [22-36] 23  SpO2:  [84 %-100 %] 100 %  BP: ()/(48-76) 102/64  Arterial Line BP: ()/(53-98) 92/59   Weight: 112.7 kg (248 lb 7.3 oz)  Body mass index is 40.1 kg/m².      Intake/Output Summary (Last 24 hours) at 8/5/2019 1426  Last data filed at 8/5/2019 0900  Gross per 24 hour   Intake 4667.51 ml   Output 4945 ml   Net -277.49 ml       Physical Exam   Constitutional: She is oriented to person, place, and time.   Morbidly obese AA female, intubated and sedated, currently connect to hemodialysis    HENT:   Head: Normocephalic and atraumatic.   Eyes: Pupils are equal, round, and reactive to light. Conjunctivae and EOM are normal.   Neck: Normal range of motion. Neck supple.   Cardiovascular: Normal rate, regular rhythm and normal heart sounds.   Pulmonary/Chest: Effort normal. No stridor. No respiratory distress. She has no wheezes. She has rales.   Abdominal: Soft. Bowel sounds are normal.   Musculoskeletal: Normal range of motion. She exhibits edema. She exhibits no tenderness or deformity.   Neurological: She is alert and oriented to person, place, and time.   Skin: Skin is warm and dry.   Psychiatric:   MAGNUS, Pt intubated/sedated       Vents:  Vent Mode: A/C (08/05/19 1357)  Ventilator Initiated: Yes (08/04/19 1139)  Set Rate: 26 bmp (08/05/19 1357)  Vt Set: 400 mL (08/05/19 1357)  PEEP/CPAP: 8 cmH20 (08/05/19 1357)  Oxygen Concentration (%):  39 (08/05/19 1400)  Peak Airway Pressure: 35 cmH2O (08/05/19 1357)  Plateau Pressure: 0 cmH20 (08/05/19 1357)  Total Ve: 11.2 mL (08/05/19 1357)  F/VT Ratio<105 (RSBI): (!) 57.42 (08/05/19 0531)  Lines/Drains/Airways     Central Venous Catheter Line                 Trialysis (Dialysis) Catheter 08/04/19 right internal jugular 1 day         Percutaneous Central Line Insertion/Assessment - triple lumen  08/04/19 1803 left internal jugular less than 1 day          Drain                 Hemodialysis AV Graft Left upper arm -- days         NG/OG Tube 08/04/19 1230 1 day          Airway                 Airway - Non-Surgical 08/04/19 1139 Endotracheal Tube 1 day          Arterial Line                 Arterial Line less than 1 day          Peripheral Intravenous Line                 Peripheral IV - Single Lumen 08/04/19 1141 22 G Right Hand 1 day              Significant Labs:    CBC/Anemia Profile:  Recent Labs   Lab 08/04/19  0651 08/05/19  0300 08/05/19  1120   WBC 5.99 10.42 9.85   HGB 10.3* 9.0* 9.1*   HCT 35.9* 32.2* 31.9*   PLT SEE COMMENT 164 153   MCV 88 89 89   RDW 21.5* 21.2* 21.4*        Chemistries:  Recent Labs   Lab 08/04/19  0651 08/04/19  1350 08/04/19  1351 08/04/19  2250 08/05/19  0300   * 133* 131* 128* 128*  128*   K 4.3 3.4* 3.4* 4.1 4.0  4.0   CL 88* 87* 87* 88* 90*  90*   CO2 30* 30* 31* 26 23  23   BUN 21* 25* 26* 24* 25*  25*   CREATININE 3.3* 3.8* 3.9* 3.8* 3.7*  3.7*   CALCIUM 8.9 8.7 8.8 8.6* 8.3*  8.3*   ALBUMIN 2.4* 2.4* 2.3* 2.5* 2.6*  2.6*   PROT 6.9 6.6  --   --  6.6   BILITOT 0.8 1.0  --   --  1.6*   ALKPHOS 118 117  --   --  109   ALT 22 20  --   --  16   AST 38 33  --   --  29   MG 1.9 1.7 1.8 2.3 2.2  2.2   PHOS 5.2* 4.8* 4.6* 4.7* 4.3  4.3         Significant Imaging:  I have reviewed all pertinent imaging results/findings within the past 24 hours.

## 2019-08-05 NOTE — SUBJECTIVE & OBJECTIVE
Interval History: Pt on SCUF this am. However, machine clotted and blood volume in set was lost. Per radiology, CXR this morning with concern for pulmonary edema, aspiration pneumonia, and/or sepsis. Pt continues to require ventilatory support and currently requiring FiO2 of 50%, and sedated on propofol. BP's averaging around 100/70 with pressor support; on 0.06mcg/kg this am. Renal labs today significant for sodium of 128. Pt is roughly net even this admission and was tolerating hourly UF rate of 300-400cc this am, before clotting.     Review of patient's allergies indicates:   Allergen Reactions    Bactrim [sulfamethoxazole-trimethoprim] Other (See Comments)     Causes renal failure    Nsaids (non-steroidal anti-inflammatory drug) Other (See Comments)     Renal failure     Current Facility-Administered Medications   Medication Frequency    0.9%  NaCl infusion (CRRT USE ONLY) Continuous    acetaminophen tablet 650 mg Q4H PRN    albuterol-ipratropium 2.5 mg-0.5 mg/3 mL nebulizer solution 3 mL Q6H PRN    chlorhexidine 0.12 % solution 15 mL BID    cinacalcet tablet 30 mg Daily with breakfast    clopidogrel tablet 75 mg Daily    dextrose 10% (D10W) Bolus PRN    dextrose 10% (D10W) Bolus PRN    dextrose 50% injection 25 g PRN    fentaNYL injection 50 mcg Q1H PRN    [START ON 8/6/2019] fludrocortisone tablet 100 mcg Daily    glucagon (human recombinant) injection 1 mg PRN    glucose chewable tablet 16 g PRN    glucose chewable tablet 24 g PRN    heparin 25,000 units in dextrose 5% (100 units/ml) IV bolus from bag - ADDITIONAL PRN BOLUS - 30 units/kg PRN    heparin 25,000 units in dextrose 5% (100 units/ml) IV bolus from bag - ADDITIONAL PRN BOLUS - 60 units/kg PRN    heparin 25,000 units in dextrose 5% 250 mL (100 units/mL) infusion LOW INTENSITY nomogram - [facility] Continuous    hydrocortisone sodium succinate injection 100 mg Q8H    insulin aspart U-100 pen 0-5 Units QID (AC + HS) PRN    levETIRAcetam  tablet 500 mg BID    LORazepam tablet 0.5 mg Q8H PRN    magnesium sulfate 2g in water 50mL IVPB (premix) PRN    miconazole NITRATE 2 % top powder BID    midodrine tablet 10 mg TID    midodrine tablet 10 mg Once    norepinephrine 4 mg in dextrose 5% 250 mL infusion (premix) (titrating) Continuous    ondansetron disintegrating tablet 8 mg Q8H PRN    [START ON 8/6/2019] pantoprazole suspension 40 mg Daily    piperacillin-tazobactam 4.5 g in sodium chloride 0.9% 100 mL IVPB (ready to mix system) Q12H    prednisoLONE acetate 1 % ophthalmic suspension 1 drop TID    propofol (DIPRIVAN) 10 mg/mL infusion Continuous    senna-docusate 8.6-50 mg per tablet 1 tablet BID    sevelamer carbonate tablet 800 mg Daily with dinner    sodium chloride 0.9% flush 10 mL PRN    sodium phosphate 20.01 mmol in dextrose 5 % 250 mL IVPB PRN    sodium phosphate 30 mmol in dextrose 5 % 250 mL IVPB PRN    sodium phosphate 39.99 mmol in dextrose 5 % 250 mL IVPB PRN    tiZANidine tablet 4 mg Nightly PRN     Facility-Administered Medications Ordered in Other Encounters   Medication Frequency    ceFAZolin injection 2 g On Call Procedure    lidocaine (PF) 10 mg/ml (1%) injection 10 mg Once    mupirocin 2 % ointment On Call Procedure    sodium chloride 0.9% flush 10 mL PRN       Objective:     Vital Signs (Most Recent):  Temp: (!) 90.7 °F (32.6 °C) (08/05/19 1200)  Pulse: 71 (08/05/19 1200)  Resp: (!) 23 (08/05/19 0740)  BP: 107/76 (08/05/19 1100)  SpO2: 100 % (08/05/19 1200)  O2 Device (Oxygen Therapy): ventilator (08/05/19 1200) Vital Signs (24h Range):  Temp:  [87.3 °F (30.7 °C)-97.8 °F (36.6 °C)] 90.7 °F (32.6 °C)  Pulse:  [] 71  Resp:  [22-36] 23  SpO2:  [84 %-100 %] 100 %  BP: ()/(48-76) 107/76  Arterial Line BP: ()/(56-68) 94/60     Weight: 112.7 kg (248 lb 7.3 oz) (08/05/19 0500)  Body mass index is 40.1 kg/m².  Body surface area is 2.29 meters squared.    I/O last 3 completed shifts:  In: 4547.5  [I.V.:4267.5; NG/GT:280]  Out: 4347 [Drains:250; Other:4097]    Physical Exam   Constitutional: She is oriented to person, place, and time. She appears well-developed and well-nourished. No distress.   HENT:   Head: Normocephalic and atraumatic.   Eyes: Pupils are equal, round, and reactive to light. Conjunctivae and EOM are normal.   Neck: Normal range of motion. Neck supple.   Cardiovascular: Normal rate, regular rhythm and normal heart sounds.   Pulmonary/Chest: Effort normal. No stridor. No respiratory distress. She has no wheezes. She has rales.   Abdominal: Soft. Bowel sounds are normal.   Musculoskeletal: Normal range of motion. She exhibits edema. She exhibits no tenderness or deformity.   Neurological: She is alert and oriented to person, place, and time.   Skin: Skin is warm and dry. She is not diaphoretic.   Psychiatric:   MAGNUS, Pt intubated/sedated       Significant Labs:  ABGs:   Recent Labs   Lab 08/05/19  1055   PH 7.289*   PCO2 51.1*   HCO3 24.5   POCSATURATED 96   BE -2     CBC:   Recent Labs   Lab 08/05/19  1120   WBC 9.85   RBC 3.58*   HGB 9.1*   HCT 31.9*      MCV 89   MCH 25.4*   MCHC 28.5*     CMP:   Recent Labs   Lab 08/05/19  0300   *  200*   CALCIUM 8.3*  8.3*   ALBUMIN 2.6*  2.6*   PROT 6.6   *  128*   K 4.0  4.0   CO2 23  23   CL 90*  90*   BUN 25*  25*   CREATININE 3.7*  3.7*   ALKPHOS 109   ALT 16   AST 29   BILITOT 1.6*     All labs within the past 24 hours have been reviewed.

## 2019-08-05 NOTE — ASSESSMENT & PLAN NOTE
Outpatient HD unit: [facility]   -Nephrologist: Patient does not know  -HD tx days: TThS  -HD tx time: 4hrs 45mins    -HD access: LUE AVG  -HD modality: iHD  -Residual urine: Minimal  -EDW: 109 kg     Plan:  - Transition to SLED   - Hourly -400cc  - Na+ set to 138   - Pt on heparin infusion, no need for citrate   - Strict I/Os and chart  - Will follow closely

## 2019-08-05 NOTE — PLAN OF CARE
Problem: Adult Inpatient Plan of Care  Goal: Plan of Care Review  Outcome: Ongoing (interventions implemented as appropriate)  Pt. resting comfortably in bed.  AC/VC c RR 26, 40% FiO2, 8 PEEP.  Does not follow to commands, but grimaces to pain.  Gtts include levo, heparin, propofol.  Orders to add vaso if levo reaches 0.2.  BG checks q4h.  Hypothermia corrected via serafin hugger.  SLED restarted c UF goal of 300-400.  Tube feeds started at 10.  Turned q2h.  Oral care provided q2h.  Skin free from new breakdown.  POC reviewed c pt., mother, and sister.  Will continue to monitor.

## 2019-08-05 NOTE — PROGRESS NOTES
Pharmacokinetic Assessment Follow Up: IV Vancomycin    Vancomycin Regimen Assessment & Plan:  - Vancomycin level collected with AM labs today resulted as 20.1 mcg/mL  - Patient with ESRD. Continuous SLED today per nephrology  - Giving vancomycin 1000 mg IV x1 dose today. Collect vancomycin level with AM labs tomorrow. Will re-dose as needed depending on level and RRT plans    Thank you for the consult,   Tavon Humphrey, PharmD, [facility]  Medical/Surgical ICU Clinical Pharmacist  Spectralink: [phone]     Patient brief summary:  Sisi Medel is a 58 y.o. female initiated on antimicrobial therapy with IV Vancomycin for treatment of suspected bacteremia    Drug Allergies:   Review of patient's allergies indicates:   Allergen Reactions    Bactrim [sulfamethoxazole-trimethoprim] Other (See Comments)     Causes renal failure    Nsaids (non-steroidal anti-inflammatory drug) Other (See Comments)     Renal failure       Actual Body Weight:   112.7 KG    Renal Function:   Estimated Creatinine Clearance: 28.2 mL/min (A) (based on SCr of 2.8 mg/dL (H)).,     Dialysis Method (if applicable):  SLED

## 2019-08-05 NOTE — PROGRESS NOTES
Subjective:      Patient ID: Sisi Medel is a 58 y.o. female.    Chief Complaint: Diabetic Foot Exam (03/01/2019 dr jill eduardo) and Diabetes Mellitus    Pt here today for multiple wounds on lower extremities. Denies any NVFCSOB chest or calf pain. Pt has a nurse changing dressings 3 times a week.     5/13/2019 Pt presents today for wound care. Pt states she has been in the hospital recently, for breathing issues. Pt denies any NVFCSOB.     5/26/2019 Pt is in SNF facility. Pt states that she had a lot of dead skin on her feet, and she started peeling it off, then it began to bleed. Pt states nurse at the facility also peeled some of the skin off.     7/29/2019 Pt has been in SNF facility, states facility has been taking care of feet.     Past Medical History:   Diagnosis Date    Anemia in ESRD (end-stage renal disease) 3/7/2016    Anticoagulant long-term use     Cervical radiculopathy 7/20/2015    CHF (congestive heart failure)     Chronic combined systolic and diastolic heart failure 6/14/2013    EF 20% 2013, improved with Medical therapy, negative PET 2013    Chronic respiratory failure with hypoxia 04/22/2013    On home oxygen at 2-5 liters    Closed fracture of proximal end of right fibula 6/27/2016    Complications due to renal dialysis device, implant, and graft     DDD (degenerative disc disease), lumbar 6/17/2015    Dependence on renal dialysis     Diabetes mellitus type II, controlled 12/8/2017    ESRD on hemodialysis 2/23/2016    Essential hypertension     Gout     Lateral meniscal tear 5/31/2016    Lumbar stenosis 6/17/2015    Obesity, Class III, BMI 40-49.9 (morbid obesity)     Pacemaker     Paroxysmal atrial fibrillation 5/16/2014    Not on anticoagulation    Peripheral neuropathy 11/13/2013    Personal history of gastric ulcer 3/19/2013    Pneumonia of left lower lobe due to infectious organism 3/10/2019    Sarcoidosis, lung 2009    Diagnosed in 2009 with ocular  manifestation, on 4L home O2 and PO steroids    Secondary pulmonary hypertension 4/7/2015    Seizure disorder 3/19/2013    Shingles 11/13/2013    Thyroid disease            Current Facility-Administered Medications on File Prior to Visit   Medication Dose Route Frequency Provider Last Rate Last Dose    ceFAZolin injection 2 g  2 g Intravenous On Call Procedure Vicky Horn MD        lidocaine (PF) 10 mg/ml (1%) injection 10 mg  1 mL Intradermal Once Vicky Horn MD        mupirocin 2 % ointment   Nasal On Call Procedure Vicky Horn MD        sodium chloride 0.9% flush 10 mL  10 mL Intravenous PRN Vicky Horn MD         Current Outpatient Medications on File Prior to Visit   Medication Sig Dispense Refill    atorvastatin (LIPITOR) 80 MG tablet TAKE 1 TABLET BY MOUTH EVERY EVENING 90 tablet 1    blood sugar diagnostic Strp 1 each by Misc.(Non-Drug; Combo Route) route once daily. (Patient taking differently: 1 each by Misc.(Non-Drug; Combo Route) route 2 (two) times daily. ) 100 each 3    cinacalcet (SENSIPAR) 30 MG Tab Take 30 mg by mouth daily with breakfast.      clopidogrel (PLAVIX) 75 mg tablet TAKE 1 TABLET(75 MG) BY MOUTH EVERY DAY 90 tablet 0    fludrocortisone (FLORINEF) 0.1 mg Tab Take 2 tablets (200 mcg total) by mouth 2 (two) times daily. 60 tablet 5    lancets Misc 1 each by Misc.(Non-Drug; Combo Route) route once daily. (Patient taking differently: 1 each by Misc.(Non-Drug; Combo Route) route 2 (two) times daily. ) 100 each 3    levETIRAcetam (KEPPRA) 500 MG Tab TAKE 1 TABLET BY MOUTH TWICE DAILY 180 tablet 3    lisinopril (PRINIVIL,ZESTRIL) 2.5 MG tablet Take 1 tablet (2.5 mg total) by mouth once daily. Hold for systolic BP <100 90 tablet 3    LORazepam (ATIVAN) 1 MG tablet Take 0.5 tablets (0.5 mg total) by mouth every 8 (eight) hours as needed for Anxiety. 14 tablet 0    miconazole NITRATE 2 % (MICOTIN) 2 % top powder Apply topically 2 (two) times daily.  0     midodrine (PROAMATINE) 10 MG tablet Take 1 tablet (10 mg total) by mouth 3 (three) times daily. 90 tablet 11    multivitamin (ONE DAILY MULTIVITAMIN) per tablet Take 1 tablet by mouth once daily.      nortriptyline (PAMELOR) 25 MG capsule Take 1 capsule by mouth nightly  1    omeprazole (PRILOSEC) 20 MG capsule TAKE 1 CAPSULE BY MOUTH EVERY DAY 90 capsule 4    patiromer calcium sorbitex (VELTASSA) 16.8 gram PwPk Hold until follow-up with PCP 1 packet 0    prednisoLONE acetate (PRED FORTE) 1 % DrpS INSTILL ONE DROP INTO  LEFT EYE THREE TIMES A DAY  3    predniSONE (DELTASONE) 20 MG tablet Take 1 tablet (20 mg total) by mouth once daily. 30 tablet 3    PROLENSA 0.07 % Drop INSTILL 1 DROP IN LEFT / RIGHT EYE QD  12    DENISE-LINDA 0.8 mg Tab TAKE 1 TABLET BY MOUTH ONCE DAILY  0    senna-docusate 8.6-50 mg (PERICOLACE) 8.6-50 mg per tablet Take 1 tablet by mouth 2 (two) times daily.      sevelamer carbonate (RENVELA) 800 mg Tab Take 1 tablet (800 mg total) by mouth daily with dinner or evening meal. 30 tablet 3    tiZANidine (ZANAFLEX) 4 MG tablet Take 4 mg by mouth nightly as needed.      TRADJENTA 5 mg Tab tablet TAKE 1 TABLET(5 MG) BY MOUTH EVERY DAY 90 tablet 1    warfarin (COUMADIN) 5 MG tablet Take 1 tablet (5mg)  by mouth every Tues, Thurs, Sat and 1/2 tablet (2.5mg) every Mon, Wed, Fri. And Sun. 30 tablet 3           Review of patient's allergies indicates:   Allergen Reactions    Bactrim [sulfamethoxazole-trimethoprim] Other (See Comments)     Causes renal failure    Nsaids (non-steroidal anti-inflammatory drug) Other (See Comments)     Renal failure           Social History     Socioeconomic History    Marital status: Single     Spouse name: Not on file    Number of children: Not on file    Years of education: Not on file    Highest education level: Not on file   Occupational History    Not on file   Social Needs    Financial resource strain: Not on file    Food insecurity:     Worry: Not on  "file     Inability: Not on file    Transportation needs:     Medical: Not on file     Non-medical: Not on file   Tobacco Use    Smoking status: Former Smoker     Packs/day: 0.50     Years: 10.00     Pack years: 5.00     Types: Cigarettes     Last attempt to quit: 1994     Years since quittin.3    Smokeless tobacco: Never Used   Substance and Sexual Activity    Alcohol use: No     Alcohol/week: 0.0 oz     Comment: rarely    Drug use: No    Sexual activity: Not Currently   Lifestyle    Physical activity:     Days per week: Not on file     Minutes per session: Not on file    Stress: Not on file   Relationships    Social connections:     Talks on phone: Not on file     Gets together: Not on file     Attends Sabianism service: Not on file     Active member of club or organization: Not on file     Attends meetings of clubs or organizations: Not on file     Relationship status: Not on file   Other Topics Concern    Are you pregnant or think you may be? No    Breast-feeding No   Social History Narrative    Lives with boyfriend/partner who helps with her care.            Review of Systems   Constitution: Negative for chills, diaphoresis, fever, malaise/fatigue and night sweats.   Cardiovascular: Positive for leg swelling. Negative for claudication and cyanosis.   Skin: Positive for color change, dry skin, nail changes, poor wound healing and unusual hair distribution.   Musculoskeletal:        Abnormal gait, uses rolling walker.    Neurological: Positive for numbness and sensory change. Negative for paresthesias, tremors and weakness.           Objective:        Vitals:    19 1319   Weight: 108.9 kg (240 lb)   Height: 5' 9.6" (1.768 m)           Physical Exam   Cardiovascular:   Pulses:       Dorsalis pedis pulses are 0 on the right side, and 0 on the left side.        Posterior tibial pulses are 1+ on the right side, and 1+ on the left side.   Diffuse non pitting edema b/L     Musculoskeletal:    " Amputations noted to right hallux and right 2nd digit   Feet:   Right Foot:   Protective Sensation: 5 sites tested. 0 sites sensed.   Left Foot:   Protective Sensation: 5 sites tested. 0 sites sensed.   Lymphadenopathy:   Negative lymphadenopathy bilateral popliteal fossa and tarsal tunnel.  Negative lymphangitic streaking bilateral foot/ankle bilateral.     Neurological:   Absent/loss of protective sensation all toes bilateral to 10 gram monofilament.     Skin: Lesion noted.   Nails x8 are short and dystrophic    Ulceration  Location: right lateral leg  Measurements: healed    Ulceration  Location: left medial hallux  Measurements: healed    Ulceration  Location: right plantar hallux  Measurements: healed    B/l plantar feet healed.                    Assessment:       Encounter Diagnosis   Name Primary?    Healed foot ulcer Yes         Plan:       Sisi was seen today for diabetic foot exam and diabetes mellitus.    Diagnoses and all orders for this visit:    Healed foot ulcer      I counseled the patient on her conditions, their implications and medical management.    Pt advised that feet are healed.   Diabetic foot exam performed on pt. Pt advised on daily monitoring and moisturizing of the feet and keeping the webspaces clean and dry Patient advised to contact the clinic if any new pedal problems should arise.   RTC in 12 weeks or sooner if any new pedal problems should arise or if condition worsens.

## 2019-08-05 NOTE — ASSESSMENT & PLAN NOTE
Patient presents with A fib in the ED. Patient is on chronic warfarin. S/P ablation multiple times  Plan  -Continue warfarin   -Pacemaker in place   08/05 DC warfarin, started on heparin

## 2019-08-05 NOTE — PROGRESS NOTES
[facility]  Critical Care Medicine  Progress Note    Patient Name: Sisi Medel  MRN: [id number]  Admission Date: 8/3/2019  Hospital Length of Stay: 2 days  Code Status: Full Code  Attending Provider: Jules Robertson MD  Primary Care Provider: Carlos A Caputo MD   Principal Problem: Acute on chronic respiratory failure with hypoxia and hypercapnia    Subjective:     HPI:  Ms Medel  59 yo f with ESRD on HD, cervical radiculopathy, CHF, Chronic resp failure with hypoxia, T2DM, DDD, paroxysmal Afib, seizure, shingles, thyroid disorder here for a 3 day history of cough and SOB. Patient was at dialysis and had to stop at 1.6L due to resp distress. She was put in a nonrebreather at 12L of oxygen. She was transported via EMS. Overnight she required BiPAP for worsening SOB. Rapid response was called this morning because she was hypotensive 70s/50s. Dr. Rosas, MICU team, went and recheck BP was 107/59. She later became hypertensive again and continued to be on BiPAP, she was stepped up for dialysis, BP monitoring, possible vasopressor requirement and further respiratory monitoring.     During step up she was persistently hypoglycemic and hypotensive, peripheral vascular access was lost and a left TLC was placed at bedside. It was determined she could not tolerated her normal HD and she had tenuous respiratory status, so decision was made to intubate. She has been started on broad spectrum abx, and right HD IJ catheter was placed. Nephro been made aware and SCUF planned for today.     Hospital/ICU Course:  08/04 Stepped up to ICU, intubated, started on broad spectrum abx, HD catheter placed, plan is for SCUF today.   08/05 SCUF started in the AM, levo requirement of 0.06. Still intubated. Plan to transition to SLED. Blood cultures negative to date, covered with Vanc and Zosyn     Interval History/Significant Events: SCUF started in the AM, levo requirement of 0.06. Still intubated. Plan to  transition to SLED. Blood cultures negative to date, covered with Vanc and Zosyn     Review of Systems   Unable to perform ROS: Intubated     Objective:     Vital Signs (Most Recent):  Temp: (!) 92.7 °F (33.7 °C) (08/05/19 1357)  Pulse: 69 (08/05/19 1400)  Resp: (!) 23 (08/05/19 0740)  BP: 102/64 (08/05/19 1400)  SpO2: 100 % (08/05/19 1400) Vital Signs (24h Range):  Temp:  [87.3 °F (30.7 °C)-97.8 °F (36.6 °C)] 92.7 °F (33.7 °C)  Pulse:  [] 69  Resp:  [22-36] 23  SpO2:  [84 %-100 %] 100 %  BP: ()/(48-76) 102/64  Arterial Line BP: ()/(53-98) 92/59   Weight: 112.7 kg (248 lb 7.3 oz)  Body mass index is 40.1 kg/m².      Intake/Output Summary (Last 24 hours) at 8/5/2019 1426  Last data filed at 8/5/2019 0900  Gross per 24 hour   Intake 4667.51 ml   Output 4945 ml   Net -277.49 ml       Physical Exam   Constitutional: She is oriented to person, place, and time.   Morbidly obese AA female, intubated and sedated, currently connect to hemodialysis    HENT:   Head: Normocephalic and atraumatic.   Eyes: Pupils are equal, round, and reactive to light. Conjunctivae and EOM are normal.   Neck: Normal range of motion. Neck supple.   Cardiovascular: Normal rate, regular rhythm and normal heart sounds.   Pulmonary/Chest: Effort normal. No stridor. No respiratory distress. She has no wheezes. She has rales.   Abdominal: Soft. Bowel sounds are normal.   Musculoskeletal: Normal range of motion. She exhibits edema. She exhibits no tenderness or deformity.   Neurological: She is alert and oriented to person, place, and time.   Skin: Skin is warm and dry.   Psychiatric:   MAGNUS, Pt intubated/sedated       Vents:  Vent Mode: A/C (08/05/19 1357)  Ventilator Initiated: Yes (08/04/19 1139)  Set Rate: 26 bmp (08/05/19 1357)  Vt Set: 400 mL (08/05/19 1357)  PEEP/CPAP: 8 cmH20 (08/05/19 1357)  Oxygen Concentration (%): 39 (08/05/19 1400)  Peak Airway Pressure: 35 cmH2O (08/05/19 1357)  Plateau Pressure: 0 cmH20 (08/05/19  1357)  Total Ve: 11.2 mL (08/05/19 1357)  F/VT Ratio<105 (RSBI): (!) 57.42 (08/05/19 0531)  Lines/Drains/Airways     Central Venous Catheter Line                 Trialysis (Dialysis) Catheter 08/04/19 right internal jugular 1 day         Percutaneous Central Line Insertion/Assessment - triple lumen  08/04/19 1803 left internal jugular less than 1 day          Drain                 Hemodialysis AV Graft Left upper arm -- days         NG/OG Tube 08/04/19 1230 1 day          Airway                 Airway - Non-Surgical 08/04/19 1139 Endotracheal Tube 1 day          Arterial Line                 Arterial Line less than 1 day          Peripheral Intravenous Line                 Peripheral IV - Single Lumen 08/04/19 1141 22 G Right Hand 1 day              Significant Labs:    CBC/Anemia Profile:  Recent Labs   Lab 08/04/19  0651 08/05/19  0300 08/05/19  1120   WBC 5.99 10.42 9.85   HGB 10.3* 9.0* 9.1*   HCT 35.9* 32.2* 31.9*   PLT SEE COMMENT 164 153   MCV 88 89 89   RDW 21.5* 21.2* 21.4*        Chemistries:  Recent Labs   Lab 08/04/19  0651 08/04/19  1350 08/04/19  1351 08/04/19  2250 08/05/19  0300   * 133* 131* 128* 128*  128*   K 4.3 3.4* 3.4* 4.1 4.0  4.0   CL 88* 87* 87* 88* 90*  90*   CO2 30* 30* 31* 26 23  23   BUN 21* 25* 26* 24* 25*  25*   CREATININE 3.3* 3.8* 3.9* 3.8* 3.7*  3.7*   CALCIUM 8.9 8.7 8.8 8.6* 8.3*  8.3*   ALBUMIN 2.4* 2.4* 2.3* 2.5* 2.6*  2.6*   PROT 6.9 6.6  --   --  6.6   BILITOT 0.8 1.0  --   --  1.6*   ALKPHOS 118 117  --   --  109   ALT 22 20  --   --  16   AST 38 33  --   --  29   MG 1.9 1.7 1.8 2.3 2.2  2.2   PHOS 5.2* 4.8* 4.6* 4.7* 4.3  4.3         Significant Imaging:  I have reviewed all pertinent imaging results/findings within the past 24 hours.      ABG  Recent Labs   Lab 08/05/19  1055   PH 7.289*   PO2 93   PCO2 51.1*   HCO3 24.5   BE -2     Assessment/Plan:     Neuro  Epilepsy  Continue keppra    Pulmonary  * Acute on chronic respiratory failure with hypoxia and  hypercapnia  Patient presents with A fib in the ED. Patient is on chronic warfarin. S/P ablation multiple times.  -Rales and pitting edema on exam  -Did not complete dialysis yesterday, most likely this is due to volume overload    Plan  -Nephro has been contacted, plan for SCUF  -Duonebs as needed for symptomatic trx     08/05-Currently receiving SCUF, plan for transition to SLED, being covered with broad spectrum abx Vanc and Zosyn    Cardiac/Vascular  Permanent atrial fibrillation    Patient presents with A fib in the ED. Patient is on chronic warfarin. S/P ablation multiple times  Plan  -Continue warfarin   -Pacemaker in place   08/05 DC warfarin, started on heparin    Acute on chronic systolic congestive heart failure  Patient has hx of HF (EF 35%). CXR in the ED suggestive of HF. BNP 1424 and troponins 0.156.  Patient is unsure if she takes her lisinopril at home.  -repeat trop 0.153  08/04 At time of step up appears volume overloaded, pitting edema and rales b/l lower lobes  -Awaiting SCUF trial to remove volume     Hypotension  On 10 mg midodrine TID at home  -Continue midodrine  -Levophed at 0.1 post intubation, will wean as tolerated  Given persistent hypotension and hypoglycemia, started on broad spectrum abx (Vanc, Zosyn) for concern of sepsis   08/05 Levophed up to 0.16, will add vaso at 0.2, she has now become hypothermic, ordering EKG, Troponin, and perform bed side US to assess cardiac function     Renal/  ESRD on hemodialysis  -Right HD catheter placed  -Did not have full dialysis session yesterday  -Nephro onboard  -Plan for SCUF today     08/05 Nephro plan below   - Transition to SLED   - Hourly -400cc  - Na+ set to 138   - Pt on heparin infusion, no need for citrate   - Strict I/Os and chart    Immunology/Multi System  Pulmonary sarcoidosis  Continue with prednisone 20 mg      Critical secondary to Patient has a condition that poses threat to life and bodily function: Severe Respiratory  Distress      Critical care was time spent personally by me on the following activities: development of treatment plan with patient or surrogate and bedside caregivers, discussions with consultants, evaluation of patient's response to treatment, examination of patient, ordering and performing treatments and interventions, ordering and review of laboratory studies, ordering and review of radiographic studies, pulse oximetry, re-evaluation of patient's condition. This critical care time did not overlap with that of any other provider or involve time for any procedures.     Rusty Marino MD  Critical Care Medicine  [facility]

## 2019-08-05 NOTE — PLAN OF CARE
"Problem: Adult Inpatient Plan of Care  Goal: Plan of Care Review  Outcome: Ongoing (interventions implemented as appropriate)  Dx: Acute on chronic respiratory failure with hypoxia and hypercapnia    Shift Events: Admitted to SICU @ 09:15. Intubated. Emergent/unsterile L Triple lumen placed. Levo and propofol started. R IJ Trialysis & L IJ Triple lumen placed. 1 amp D50. CRRT (SCUF) started.      Neuro: Sedated, Arouses to Voice, Follows Commands and Moves All Extremities    Vital Signs: BP 95/68 (BP Location: Right arm, Patient Position: Lying)   Pulse 71   Temp 97.6 °F (36.4 °C) (Oral)   Resp (!) 24   Ht 5' 6" (1.676 m)   Wt 108.3 kg (238 lb 11.2 oz)   LMP  (LMP Unknown)   SpO2 100%   Breastfeeding? No   BMI 38.53 kg/m²     Diet: NPO    Gtts: Propfol and Norepinephrine    Urine Output: Anuric     Drains: OG tube to LIWS.    Restraints - bilateral soft wrists, no injuries noted.      Labs/Accuchecks: Daily labs. Accuchecks ACHS.    Skin: Preexisting/healing ulcer to right lower calf. Dried/healing ulcerations to bilateral heels and toes. Sacrum and gluteal fold appears reddened, dry, and intact with excoriation.          "

## 2019-08-05 NOTE — ASSESSMENT & PLAN NOTE
On 10 mg midodrine TID at home  -Continue midodrine  -Levophed at 0.1 post intubation, will wean as tolerated  Given persistent hypotension and hypoglycemia, started on broad spectrum abx (Vanc, Zosyn) for concern of sepsis   08/05 Levophed up to 0.16, will add vaso at 0.2, she has now become hypothermic, ordering EKG, Troponin, and perform bed side US to assess cardiac function

## 2019-08-05 NOTE — NURSING
Dr. Marino at bedside, aware of pt.'s hypothermia.  Esophageal probe placed and serafin hugger applied.  Will continue to monitor.

## 2019-08-05 NOTE — PLAN OF CARE
Carlos A Caputo MD  [address] [facility] [place] [address]    [id number] - [place], [place] - [address] [facility] [address]  [address]  [facility] [address]  Phone: [phone] Fax: [phone]    [facility] - [place], [place] - [address] [facility] [address]  [facility] [address]  Phone: [phone] Fax: [phone]    Payor: [employer] MEDICARE / Plan: [employer] HEALTH / Product Type: Medicare Advantage /     Extended Emergency Contact Information  Primary Emergency Contact: Maikel Magallanes  Address: [address] [place] [address] [facility]  Home Phone: [phone]  Mobile Phone: [phone]  Relation: Mother  Secondary Emergency Contact: Kenan Brown   [facility]  Home Phone: [phone]  Mobile Phone: [phone]  Relation: Son    Future Appointments   Date Time Provider Department Center   8/6/2019  3:20 PM Mari Benitez MD [facility] HEPAT [address]   8/12/2019 12:30 PM Claudia Ron DPM [facility] POD [address] 9/20/2019 10:00 AM [facility] PROCEDURAL LAB [facility] NEURO [facility]   9/20/2019 12:00 PM EKG, APPT [facility] EKG [facility]   9/20/2019  1:00 PM COORDINATED DEVICE CHECK [facility] AIDEN [facility]   9/20/2019  2:00 PM Arabella Matthews NP [facility] ARRHYTH [facility]   10/1/2019  8:00 AM HOME MONITOR DEVICE CHECK, [facility] ARRLORAINE [facility]          08/05/19 1452   Discharge Assessment   Assessment Type Discharge Planning Assessment   Confirmed/corrected address and phone number on facesheet? Yes   Assessment information obtained from? Caregiver;Medical Record   Communicated expected length of stay with patient/caregiver no   Prior to hospitilization cognitive status: Alert/Oriented   Prior to hospitalization functional status: Partially Dependent;Assistive Equipment   Current cognitive status: Coma/Sedated/Intubated   Current Functional Status: Completely Dependent   Facility  Arrived From: ED    Lives With significant other  (Osbaldo Crowley (significant other) )   Able to Return to Prior Arrangements other (see comments)  (TBD)   Is patient able to care for self after discharge? Unable to determine at this time (comments)   Who are your caregiver(s) and their phone number(s)? Osbaldo Crowley (significant other) [phone],  Maikel Magallanes (mother) [phone], [phone], Kenan Brown (son) [phone]   Patient's perception of discharge disposition other (comments)  (Unable to assess, Patient intubated/sedated)   Readmission Within the Last 30 Days current reason for admission unrelated to previous admission   If yes, most recent facility name: [facility]   Patient currently being followed by outpatient case management? No   Patient currently receives any other outside agency services? Yes   Name and contact number of agency or person providing outside services [facility] ([facility])    Is it the patient/care giver preference to resume care with the current outside agency? Yes   Equipment Currently Used at Home wheelchair;walker, rolling;shower chair;oxygen   Do you have any problems affording any of your prescribed medications? No   Is the patient taking medications as prescribed? yes   Does the patient have transportation home? Yes   Transportation Anticipated family or friend will provide   Dialysis Name and Scheduled days [facility] (T,TH,Sat)    Discharge Plan A Long-term acute care facility (LTAC)   Discharge Plan B Skilled Nursing Facility   DME Needed Upon Discharge  other (see comments)  (TBD)   Patient/Family in Agreement with Plan yes   Readmission Questionnaire   At the time of your discharge, did someone talk to you about what your health problems were? Yes   At the time of discharge, did someone talk to you about what to watch out for regarding worsening of your health problem? Yes   At the time of discharge, did someone talk to you about what to  do if you experienced worsening of your health problem? Yes   At the time of discharge, did someone talk to you about which medication to take when you left the hospital and which ones to stop taking? Yes   At the time of discharge, did someone talk to you about when and where to follow up with a doctor after you left the hospital? Yes   What do you believe caused you to be sick enough to be re-admitted? short of breath    Do you have problems taking your medications as prescribed? No   Do you have any problems affording any of  your prescribed medications? No   Do you have problems obtaining/receiving your medications? No   Does the patient have transportation to healthcare appointments? Yes   Living Arrangements house   Does the patient have family/friends to help with healtcare needs after discharge? yes       aVnessa Wharton RN, [place]  Case Management-Critical Care     [phone]

## 2019-08-05 NOTE — ASSESSMENT & PLAN NOTE
Patient presents with A fib in the ED. Patient is on chronic warfarin. S/P ablation multiple times.  -Rales and pitting edema on exam  -Did not complete dialysis yesterday, most likely this is due to volume overload    Plan  -Nephro has been contacted, plan for SCUF  -Duonebs as needed for symptomatic trx     08/05-Currently receiving SCUF, plan for transition to SLED, being covered with broad spectrum abx Vanc and Zosyn

## 2019-08-05 NOTE — ASSESSMENT & PLAN NOTE
-Right HD catheter placed  -Did not have full dialysis session yesterday  -Nephro onboard  -Plan for SCUF today     08/05 Nephro plan below   - Transition to SLED   - Hourly -400cc  - Na+ set to 138   - Pt on heparin infusion, no need for citrate   - Strict I/Os and chart

## 2019-08-05 NOTE — PROGRESS NOTES
Notified Dr. Huggins of patient's ABG results, pH 7.289, CO2 51.1, PO2 93, HCO3 24.5. MD stated no vent setting changes at this time. Notified MD that patient's son called asking to speak with the physician regarding plan of care. MD stated he will call patient's family and update them.

## 2019-08-06 NOTE — PROGRESS NOTES
Dr. Cordero and Critical Care team at pt's bedside. Orders to turn off Vaso gtt per Dr. Cordero and titrate only Levo gtt.    20 minutes after Vaso turned off, increased Levo requirements needed to maintain MAP > 65. Notified Dr. Knapp of Levo currently @ 0.14 mcg/kg/min. Orders to notify team if Levo requirements reach 0.2 mcg/kg/min.

## 2019-08-06 NOTE — PROGRESS NOTES
[facility]  Critical Care Medicine  Progress Note    Patient Name: Sisi Medel  MRN: [id number]  Admission Date: 8/3/2019  Hospital Length of Stay: 3 days  Code Status: Full Code  Attending Provider: Jules Robertson MD  Primary Care Provider: Carlos A Caputo MD   Principal Problem: Acute on chronic respiratory failure with hypoxia and hypercapnia    Subjective:     HPI:  Ms Medel  57 yo f with ESRD on HD, cervical radiculopathy, CHF, Chronic resp failure with hypoxia, T2DM, DDD, paroxysmal Afib, seizure, shingles, thyroid disorder here for a 3 day history of cough and SOB. Patient was at dialysis and had to stop at 1.6L due to resp distress. She was put in a nonrebreather at 12L of oxygen. She was transported via EMS. Overnight she required BiPAP for worsening SOB. Rapid response was called this morning because she was hypotensive 70s/50s. Dr. Rosas, MICU team, went and recheck BP was 107/59. She later became hypertensive again and continued to be on BiPAP, she was stepped up for dialysis, BP monitoring, possible vasopressor requirement and further respiratory monitoring.     During step up she was persistently hypoglycemic and hypotensive, peripheral vascular access was lost and a left TLC was placed at bedside. It was determined she could not tolerated her normal HD and she had tenuous respiratory status, so decision was made to intubate. She has been started on broad spectrum abx, and right HD IJ catheter was placed. Nephro been made aware and SCUF planned for today.     Hospital/ICU Course:  08/04 Stepped up to ICU, intubated, started on broad spectrum abx, HD catheter placed, plan is for SCUF today.   08/05 SCUF started in the AM, levo requirement of 0.06. Still intubated. Plan to transition to SLED. Blood cultures negative to date, covered with Vanc and Zosyn   08/06 On SLED today, had increase of pressor requirement was at 0.13 levo and .04 vaso, increase white count 13.3  and temp overnight 100.4. Sputum culture grew pseudomonas, d/c vancomycin, plan is to transition to SCUF.     Interval History/Significant Events: 08/06 On SLED today, had increase of pressor requirement was at 0.13 levo and .04 vaso, increase white count 13.3 and temp overnight 100.4. Sputum culture grew pseudomonas, d/c vancomycin, plan is to transition to SCUF.  Going to get TTE today.     Review of Systems   Unable to perform ROS: Intubated     Objective:     Vital Signs (Most Recent):  Temp: 96.4 °F (35.8 °C) (08/06/19 1331)  Pulse: 70 (08/06/19 1331)  Resp: (!) 23 (08/05/19 0740)  BP: 96/60 (08/06/19 1200)  SpO2: 98 % (08/06/19 1331) Vital Signs (24h Range):  Temp:  [94.3 °F (34.6 °C)-100.4 °F (38 °C)] 96.4 °F (35.8 °C)  Pulse:  [69-75] 70  SpO2:  [81 %-100 %] 98 %  BP: ()/(55-69) 96/60  Arterial Line BP: ()/(43-65) 100/59   Weight: 110 kg (242 lb 8.1 oz)  Body mass index is 39.14 kg/m².      Intake/Output Summary (Last 24 hours) at 8/6/2019 1430  Last data filed at 8/6/2019 1300  Gross per 24 hour   Intake 6004 ml   Output 8136 ml   Net -2132 ml       Physical Exam   Constitutional: She is oriented to person, place, and time.   Morbidly obese AA female, intubated and sedated, currently on hemodialysis    HENT:   Head: Normocephalic and atraumatic.   Eyes: Pupils are equal, round, and reactive to light. Conjunctivae and EOM are normal.   Neck: Normal range of motion. Neck supple.   Cardiovascular: Normal rate, regular rhythm and normal heart sounds.   Pulmonary/Chest: Effort normal. No stridor. No respiratory distress. She has no wheezes. She has rales.   Abdominal: Soft. Bowel sounds are normal.   Musculoskeletal: Normal range of motion. She exhibits edema. She exhibits no tenderness or deformity.   Neurological: She is alert and oriented to person, place, and time.   Skin: Skin is warm and dry.   Psychiatric:   MAGNUS, Pt intubated/sedated       Vents:  Vent Mode: A/C (08/06/19 1331)  Ventilator  Initiated: Yes (08/04/19 1139)  Set Rate: 20 bmp (08/06/19 1331)  Vt Set: 400 mL (08/06/19 1331)  PEEP/CPAP: 8 cmH20 (08/06/19 1331)  Oxygen Concentration (%): 30 (08/06/19 1331)  Peak Airway Pressure: 34 cmH2O (08/06/19 1331)  Plateau Pressure: 0 cmH20 (08/06/19 1331)  Total Ve: 8.71 mL (08/06/19 1331)  F/VT Ratio<105 (RSBI): (!) 57.42 (08/05/19 0531)  Lines/Drains/Airways     Central Venous Catheter Line                 Trialysis (Dialysis) Catheter 08/04/19 right internal jugular 2 days         Percutaneous Central Line Insertion/Assessment - triple lumen  08/04/19 1803 left internal jugular 1 day          Drain                 Hemodialysis AV Graft Left upper arm -- days         NG/OG Tube 08/04/19 1230 2 days          Airway                 Airway - Non-Surgical 08/04/19 1139 Endotracheal Tube 2 days          Arterial Line                 Arterial Line 1 day          Peripheral Intravenous Line                 Peripheral IV - Single Lumen 08/04/19 1141 22 G Right Hand 2 days              Significant Labs:    CBC/Anemia Profile:  Recent Labs   Lab 08/05/19  0300 08/05/19  1120 08/06/19  0300   WBC 10.42 9.85 13.37*  13.37*   HGB 9.0* 9.1* 9.1*  9.1*   HCT 32.2* 31.9* 30.7*  30.7*    153 182  182   MCV 89 89 85  85   RDW 21.2* 21.4* 21.3*  21.3*        Chemistries:  Recent Labs   Lab 08/05/19  0300 08/05/19  1420 08/05/19  2230 08/06/19  0300   *  128* 131* 134* 136  136   K 4.0  4.0 4.4 4.5 4.6  4.6   CL 90*  90* 98 100 100  100   CO2 23  23 21* 23 23  23   BUN 25*  25* 21* 6 4*  4*   CREATININE 3.7*  3.7* 2.8* 1.2 0.9  0.9   CALCIUM 8.3*  8.3* 7.8* 8.7 8.7  8.7   ALBUMIN 2.6*  2.6* 2.1* 2.5* 2.5*  2.5*   PROT 6.6  --   --  6.8   BILITOT 1.6*  --   --  1.7*   ALKPHOS 109  --   --  129   ALT 16  --   --  18   AST 29  --   --  40   MG 2.2  2.2 2.0 2.0 1.9   PHOS 4.3  4.3 3.5 2.0* 2.2*         Significant Imaging:  I have reviewed all pertinent imaging results/findings within  the past 24 hours.      ABG  Recent Labs   Lab 08/05/19  1055   PH 7.289*   PO2 93   PCO2 51.1*   HCO3 24.5   BE -2     Assessment/Plan:     Neuro  Epilepsy  Continue keppra    Pulmonary  * Acute on chronic respiratory failure with hypoxia and hypercapnia  Patient presents with A fib in the ED. Patient is on chronic warfarin. S/P ablation multiple times.  -Rales and pitting edema on exam  -Did not complete dialysis yesterday, most likely this is due to volume overload    Plan  -Nephro has been contacted, plan for SCUF  -Duonebs as needed for symptomatic trx     08/05-Currently receiving SCUF, plan for transition to SLED, being covered with broad spectrum abx Vanc and Zosyn  08/06-Transitioning back from SLED to SCUF, sputum grew pseudomonas, deescalating to only zosyn     Cardiac/Vascular  Permanent atrial fibrillation    Patient presents with A fib in the ED. Patient is on chronic warfarin. S/P ablation multiple times  Plan  -Continue warfarin   -Pacemaker in place   08/05 DC warfarin, started on heparin    Acute on chronic systolic congestive heart failure  Patient has hx of HF (EF 35%). CXR in the ED suggestive of HF. BNP 1424 and troponins 0.156.  Patient is unsure if she takes her lisinopril at home.  -repeat trop 0.153  08/04 At time of step up appears volume overloaded, pitting edema and rales b/l lower lobes  -Awaiting SCUF trial to remove volume   08/06-Chest Xray does not show interval difference of fluid overload, continuing to try to get off volume     Hypotension  On 10 mg midodrine TID at home  -Continue midodrine  -Levophed at 0.1 post intubation, will wean as tolerated  Given persistent hypotension and hypoglycemia, started on broad spectrum abx (Vanc, Zosyn) for concern of sepsis   08/05 Levophed up to 0.16, will add vaso at 0.2, she has now become hypothermic, ordering EKG, Troponin, and perform bed side US to assess cardiac function   08/06 Levophed at 0.13, vaso 0.04 was added, told nursing to  turn off vaso, and titrate according to parameters as listed above    Renal/  ESRD on hemodialysis  -Right HD catheter placed  -Did not have full dialysis session yesterday  -Nephro onboard  -Plan for SCUF today     08/05 Nephro plan below   - Transition to SLED   - Hourly -400cc  - Na+ set to 138   - Pt on heparin infusion, no need for citrate   - Strict I/Os and chart    08/06 Nephro plan below  - Transition to SCUF    - Hourly -400cc  - Pt on heparin infusion, no need for citrate   - Strict I/Os and chart      Immunology/Multi System  Pulmonary sarcoidosis  Continue with prednisone 20 mg          Critical secondary to Patient has a condition that poses threat to life and bodily function: Severe Respiratory Distress      Critical care was time spent personally by me on the following activities: development of treatment plan with patient or surrogate and bedside caregivers, discussions with consultants, evaluation of patient's response to treatment, examination of patient, ordering and performing treatments and interventions, ordering and review of laboratory studies, ordering and review of radiographic studies, pulse oximetry, re-evaluation of patient's condition. This critical care time did not overlap with that of any other provider or involve time for any procedures.     Rusty Marino MD  Critical Care Medicine  [facility]

## 2019-08-06 NOTE — PROGRESS NOTES
[facility]  Nephrology  Progress Note    Patient Name: Sisi Medel  MRN: [id number]  Admission Date: 8/3/2019  Hospital Length of Stay: 3 days  Attending Provider: Jules Robertson MD   Primary Care Physician: Carlos A Caputo MD  Principal Problem:Acute on chronic respiratory failure with hypoxia and hypercapnia    Interval History: Pt seen on SLED this morning and is net negative 384ml for the past 24hrs. Improvement seen in electrolytes. Pt remains sedated and ventilated, on Fi02 of 35%. Pressor requirements have increased and vaso was added in addition to levo.     Review of patient's allergies indicates:   Allergen Reactions    Bactrim [sulfamethoxazole-trimethoprim] Other (See Comments)     Causes renal failure    Nsaids (non-steroidal anti-inflammatory drug) Other (See Comments)     Renal failure     Current Facility-Administered Medications   Medication Frequency    0.9%  NaCl infusion (CRRT USE ONLY) Continuous    acetaminophen tablet 650 mg Q4H PRN    albuterol-ipratropium 2.5 mg-0.5 mg/3 mL nebulizer solution 3 mL Q6H PRN    chlorhexidine 0.12 % solution 15 mL BID    cinacalcet tablet 30 mg Daily with breakfast    clopidogrel tablet 75 mg Daily    dextrose 10% (D10W) Bolus PRN    dextrose 10% (D10W) Bolus PRN    dextrose 50% injection 25 g PRN    fentaNYL 2500 mcg in 0.9% sodium chloride 250 mL infusion premix (titrating) Continuous    fentaNYL injection 50 mcg Q1H PRN    fludrocortisone tablet 100 mcg Daily    glucagon (human recombinant) injection 1 mg PRN    glucose chewable tablet 16 g PRN    glucose chewable tablet 24 g PRN    heparin 25,000 units in dextrose 5% (100 units/ml) IV bolus from bag - ADDITIONAL PRN BOLUS - 30 units/kg PRN    heparin 25,000 units in dextrose 5% (100 units/ml) IV bolus from bag - ADDITIONAL PRN BOLUS - 60 units/kg PRN    heparin 25,000 units in dextrose 5% 250 mL (100 units/mL) infusion LOW INTENSITY nomogram - [facility]  Continuous    hydrocortisone sodium succinate injection 100 mg Q8H    insulin aspart U-100 pen 0-5 Units QID (AC + HS) PRN    levETIRAcetam tablet 500 mg BID    LORazepam tablet 0.5 mg Q8H PRN    magnesium sulfate 2g in water 50mL IVPB (premix) PRN    miconazole NITRATE 2 % top powder BID    midodrine tablet 10 mg TID    norepinephrine 4 mg in dextrose 5% 250 mL infusion (premix) (titrating) Continuous    ondansetron disintegrating tablet 8 mg Q8H PRN    pantoprazole suspension 40 mg Daily    piperacillin-tazobactam 4.5 g in sodium chloride 0.9% 100 mL IVPB (ready to mix system) Q8H    prednisoLONE acetate 1 % ophthalmic suspension 1 drop TID    propofol (DIPRIVAN) 10 mg/mL infusion Continuous    senna-docusate 8.6-50 mg per tablet 1 tablet BID    sevelamer carbonate tablet 800 mg Daily with dinner    sodium chloride 0.9% flush 10 mL PRN    sodium phosphate 20.01 mmol in dextrose 5 % 250 mL IVPB PRN    sodium phosphate 30 mmol in dextrose 5 % 250 mL IVPB PRN    sodium phosphate 39.99 mmol in dextrose 5 % 250 mL IVPB PRN    tiZANidine tablet 4 mg Nightly PRN    vasopressin (PITRESSIN) 0.2 Units/mL in dextrose 5 % 100 mL infusion Continuous     Facility-Administered Medications Ordered in Other Encounters   Medication Frequency    ceFAZolin injection 2 g On Call Procedure    lidocaine (PF) 10 mg/ml (1%) injection 10 mg Once    mupirocin 2 % ointment On Call Procedure    sodium chloride 0.9% flush 10 mL PRN       Objective:     Vital Signs (Most Recent):  Temp: 97.2 °F (36.2 °C) (08/06/19 1200)  Pulse: 70 (08/06/19 1200)  Resp: (!) 23 (08/05/19 0740)  BP: 96/60 (08/06/19 1200)  SpO2: 100 % (08/06/19 1200)  O2 Device (Oxygen Therapy): ventilator (08/06/19 1200) Vital Signs (24h Range):  Temp:  [91.9 °F (33.3 °C)-100.4 °F (38 °C)] 97.2 °F (36.2 °C)  Pulse:  [68-75] 70  SpO2:  [81 %-100 %] 100 %  BP: ()/(55-69) 96/60  Arterial Line BP: ()/(43-98) 100/59     Weight: 110 kg (242 lb 8.1  oz) (08/06/19 0400)  Body mass index is 39.14 kg/m².  Body surface area is 2.26 meters squared.    I/O last 3 completed shifts:  In: 8924.4 [I.V.:8434.4; NG/GT:490]  Out: 9717 [Drains:400; Other:9317]    Physical Exam   Constitutional: She is oriented to person, place, and time. She appears well-developed and well-nourished. No distress.   HENT:   Head: Normocephalic and atraumatic.   Eyes: Pupils are equal, round, and reactive to light. Conjunctivae and EOM are normal.   Neck: Normal range of motion. Neck supple.   Cardiovascular: Normal rate, regular rhythm and normal heart sounds.   Pulmonary/Chest: Effort normal. No stridor. No respiratory distress. She has no wheezes. She has rales.   Abdominal: Soft. Bowel sounds are normal.   Musculoskeletal: Normal range of motion. She exhibits edema. She exhibits no tenderness or deformity.   Neurological: She is alert and oriented to person, place, and time.   Skin: Skin is warm and dry. She is not diaphoretic.   Psychiatric:   MAGNUS, Pt intubated/sedated       Significant Labs:  ABGs:   Recent Labs   Lab 08/05/19  1055   PH 7.289*   PCO2 51.1*   HCO3 24.5   POCSATURATED 96   BE -2     CBC:   Recent Labs   Lab 08/06/19  0300   WBC 13.37*  13.37*   RBC 3.63*  3.63*   HGB 9.1*  9.1*   HCT 30.7*  30.7*     182   MCV 85  85   MCH 25.1*  25.1*   MCHC 29.6*  29.6*     CMP:   Recent Labs   Lab 08/06/19  0300   *  166*   CALCIUM 8.7  8.7   ALBUMIN 2.5*  2.5*   PROT 6.8     136   K 4.6  4.6   CO2 23  23     100   BUN 4*  4*   CREATININE 0.9  0.9   ALKPHOS 129   ALT 18   AST 40   BILITOT 1.7*     All labs within the past 24 hours have been reviewed.         Assessment/Plan:     ESRD on hemodialysis  Outpatient HD unit: [facility]   -Nephrologist: Patient does not know  -HD tx days: TThS  -HD tx time: 4hrs 45mins    -HD access: LUE AVG  -HD modality: iHD  -Residual urine: Minimal  -EDW: 109 kg     Plan:  - Transition to SCUF    - Hourly  -400cc  - Pt on heparin infusion, no need for citrate   - Strict I/Os and chart  - Will follow closely        Thank you for your consult. I will follow-up with patient. Please contact us if you have any additional questions.    Dinh Rodriguez NP  Nephrology  [facility]

## 2019-08-06 NOTE — PROGRESS NOTES
Therapy with vancomycin complete and/or consult discontinued by provider.  Pharmacy will sign off, please re-consult as needed.    Tavon Humphrey, PharmD, BCPS  Medical/Surgical ICU Clinical Pharmacist  Spectralink: [id number]

## 2019-08-06 NOTE — ASSESSMENT & PLAN NOTE
-Right HD catheter placed  -Did not have full dialysis session yesterday  -Nephro onboard  -Plan for SCUF today     08/05 Nephro plan below   - Transition to SLED   - Hourly -400cc  - Na+ set to 138   - Pt on heparin infusion, no need for citrate   - Strict I/Os and chart    08/06 Nephro plan below  - Transition to SCUF    - Hourly -400cc  - Pt on heparin infusion, no need for citrate   - Strict I/Os and chart

## 2019-08-06 NOTE — ASSESSMENT & PLAN NOTE
Patient presents with A fib in the ED. Patient is on chronic warfarin. S/P ablation multiple times.  -Rales and pitting edema on exam  -Did not complete dialysis yesterday, most likely this is due to volume overload    Plan  -Nephro has been contacted, plan for SCUF  -Duonebs as needed for symptomatic trx     08/05-Currently receiving SCUF, plan for transition to SLED, being covered with broad spectrum abx Vanc and Zosyn  08/06-Transitioning back from SLED to SCUF, sputum grew pseudomonas, deescalating to only zosyn

## 2019-08-06 NOTE — SUBJECTIVE & OBJECTIVE
Interval History/Significant Events: 08/06 On SLED today, had increase of pressor requirement was at 0.13 levo and .04 vaso, increase white count 13.3 and temp overnight 100.4. Sputum culture grew pseudomonas, d/c vancomycin, plan is to transition to SCUF.  Going to get TTE today.     Review of Systems   Unable to perform ROS: Intubated     Objective:     Vital Signs (Most Recent):  Temp: 96.4 °F (35.8 °C) (08/06/19 1331)  Pulse: 70 (08/06/19 1331)  Resp: (!) 23 (08/05/19 0740)  BP: 96/60 (08/06/19 1200)  SpO2: 98 % (08/06/19 1331) Vital Signs (24h Range):  Temp:  [94.3 °F (34.6 °C)-100.4 °F (38 °C)] 96.4 °F (35.8 °C)  Pulse:  [69-75] 70  SpO2:  [81 %-100 %] 98 %  BP: ()/(55-69) 96/60  Arterial Line BP: ()/(43-65) 100/59   Weight: 110 kg (242 lb 8.1 oz)  Body mass index is 39.14 kg/m².      Intake/Output Summary (Last 24 hours) at 8/6/2019 1430  Last data filed at 8/6/2019 1300  Gross per 24 hour   Intake 6004 ml   Output 8136 ml   Net -2132 ml       Physical Exam   Constitutional: She is oriented to person, place, and time.   Morbidly obese AA female, intubated and sedated, currently on hemodialysis    HENT:   Head: Normocephalic and atraumatic.   Eyes: Pupils are equal, round, and reactive to light. Conjunctivae and EOM are normal.   Neck: Normal range of motion. Neck supple.   Cardiovascular: Normal rate, regular rhythm and normal heart sounds.   Pulmonary/Chest: Effort normal. No stridor. No respiratory distress. She has no wheezes. She has rales.   Abdominal: Soft. Bowel sounds are normal.   Musculoskeletal: Normal range of motion. She exhibits edema. She exhibits no tenderness or deformity.   Neurological: She is alert and oriented to person, place, and time.   Skin: Skin is warm and dry.   Psychiatric:   MAGNUS, Pt intubated/sedated       Vents:  Vent Mode: A/C (08/06/19 1331)  Ventilator Initiated: Yes (08/04/19 1139)  Set Rate: 20 bmp (08/06/19 1331)  Vt Set: 400 mL (08/06/19 1331)  PEEP/CPAP: 8  cmH20 (08/06/19 1331)  Oxygen Concentration (%): 30 (08/06/19 1331)  Peak Airway Pressure: 34 cmH2O (08/06/19 1331)  Plateau Pressure: 0 cmH20 (08/06/19 1331)  Total Ve: 8.71 mL (08/06/19 1331)  F/VT Ratio<105 (RSBI): (!) 57.42 (08/05/19 0531)  Lines/Drains/Airways     Central Venous Catheter Line                 Trialysis (Dialysis) Catheter 08/04/19 right internal jugular 2 days         Percutaneous Central Line Insertion/Assessment - triple lumen  08/04/19 1803 left internal jugular 1 day          Drain                 Hemodialysis AV Graft Left upper arm -- days         NG/OG Tube 08/04/19 1230 2 days          Airway                 Airway - Non-Surgical 08/04/19 1139 Endotracheal Tube 2 days          Arterial Line                 Arterial Line 1 day          Peripheral Intravenous Line                 Peripheral IV - Single Lumen 08/04/19 1141 22 G Right Hand 2 days              Significant Labs:    CBC/Anemia Profile:  Recent Labs   Lab 08/05/19  0300 08/05/19  1120 08/06/19  0300   WBC 10.42 9.85 13.37*  13.37*   HGB 9.0* 9.1* 9.1*  9.1*   HCT 32.2* 31.9* 30.7*  30.7*    153 182  182   MCV 89 89 85  85   RDW 21.2* 21.4* 21.3*  21.3*        Chemistries:  Recent Labs   Lab 08/05/19  0300 08/05/19  1420 08/05/19  2230 08/06/19  0300   *  128* 131* 134* 136  136   K 4.0  4.0 4.4 4.5 4.6  4.6   CL 90*  90* 98 100 100  100   CO2 23  23 21* 23 23  23   BUN 25*  25* 21* 6 4*  4*   CREATININE 3.7*  3.7* 2.8* 1.2 0.9  0.9   CALCIUM 8.3*  8.3* 7.8* 8.7 8.7  8.7   ALBUMIN 2.6*  2.6* 2.1* 2.5* 2.5*  2.5*   PROT 6.6  --   --  6.8   BILITOT 1.6*  --   --  1.7*   ALKPHOS 109  --   --  129   ALT 16  --   --  18   AST 29  --   --  40   MG 2.2  2.2 2.0 2.0 1.9   PHOS 4.3  4.3 3.5 2.0* 2.2*         Significant Imaging:  I have reviewed all pertinent imaging results/findings within the past 24 hours.

## 2019-08-06 NOTE — PLAN OF CARE
Pt remains intubated.  A/C 35%/5 peep/rate 26.  Does not follow commands. Withdraws to pain in all ext.  PPM in place.  CVP 10.  TMAX 99. Pt was hypothermic.  Maintaining MAP > 65 with pressors.  Levophed @ 0.15 mcg/kg/min.  Vasopressin @ 0.04 units/min. Started over night.  Fentanyl gtt started overnight with goal to wean propofol down.  Heparin gtt remains infusing.   TF @ 10cc.hr.  Cont CRRT in place with UF @ goal 400cc/hr.  Accu q4h.  Trending labs. Replacing lytes.  Bath given.  Sons updated on POC by RN and critical care MD. All questions and concerns addressed.

## 2019-08-06 NOTE — PROGRESS NOTES
"[facility]  Adult Nutrition  Progress Note    SUMMARY       Recommendations  Recommendation/Intervention:   1. Recommend modifying TF to Peptamen Intense VHP at a goal rate of 55 mL/hr - to provide 1320 kcal/day (1748 kcal w/propofol), 121g protein/day, and 1109mL free fluid/day.   2. When able to extubate, ADAT to Renal with texture per SLP.   RD to monitor.    Goals: Patient to meet > 85% EEN and EPN by RD follow-up  Nutrition Goal Status: new  Communication of RD Recs: (POC)    Reason for Assessment  Reason For Assessment: new tube feeding  Diagnosis: (respiratory failure)  Relevant Medical History: CHF, DM2, ESRD on HD, seizures  General Information Comments: Intubated, sedated. On CRRT. Trickle TF started. Patient appears nourished and no physical signs of malnutrition.  Nutrition Discharge Planning: Unable to determine at this time.    Nutrition Risk Screen  Nutrition Risk Screen: dysphagia or difficulty swallowing    Nutrition/Diet History  Spiritual, Cultural Beliefs, Holiness Practices, Values that Affect Care: no  Factors Affecting Nutritional Intake: NPO, on mechanical ventilation    Anthropometrics  Temp: 97.9 °F (36.6 °C)  Height Method: Stated  Height: 5' 6" (167.6 cm)  Height (inches): 66 in  Weight Method: Bed Scale  Weight: 110 kg (242 lb 8.1 oz)  Weight (lb): 242.51 lb  Ideal Body Weight (IBW), Female: 130 lb  % Ideal Body Weight, Female (lb): 191.12 lb  BMI (Calculated): 40.2  BMI Grade: greater than 40 - morbid obesity    Lab/Procedures/Meds  Pertinent Labs Reviewed: reviewed  Pertinent Labs Comments: Glu 166, POCT GLu 144-181, HgbA1c 6.1, Phos 2.2, Alb 2.5  Pertinent Medications Reviewed: reviewed  Pertinent Medications Comments: pantoprazole, senna-docusate, fentnayl, levophed, propofol, vasopressin    Estimated/Assessed Needs  Weight Used For Calorie Calculations: 110 kg (242 lb 8.1 oz)  Energy Calorie Requirements (kcal): 1540 kcal/day  Energy Need Method: " Kcal/kg(14)  Protein Requirements: 118 g/day(2.0 g/kg)  Weight Used For Protein Calculations: 59.1 kg (130 lb 4.7 oz)(IBW)  Fluid Requirements (mL): 1 mL/kcal or per MD  Estimated Fluid Requirement Method: RDA Method  RDA Method (mL): 1540  CHO Requirement: 50% total kcal    Nutrition Prescription Ordered  Current Diet Order: NPO  Current Nutrition Support Formula Ordered: Novasource Renal  Current Nutrition Support Rate Ordered: 10 (ml)  Current Nutrition Support Frequency Ordered: mL/hr    Evaluation of Received Nutrient/Fluid Intake  Enteral Calories (kcal): 480  Enteral Protein (gm): 22  Enteral (Free Water) Fluid (mL): 172  Other Calories (kcal): 428(propofol)  Total Calories (kcal): 908  % Kcal Needs: 59%  % Protein Needs: 19%  I/O: Noted  Energy Calories Required: not meeting needs  Protein Required: not meeting needs  Fluid Required: (per MD)  Comments: LBM 8/2  Tolerance: tolerating  % Intake of Estimated Energy Needs: 50 - 75 %  % Meal Intake: NPO    Nutrition Risk  Level of Risk/Frequency of Follow-up: high(2x/week)     Assessment and Plan  Nutrition Problem  Inadequate energy intake    Related to (etiology):   Decreased ability to consume sufficient energy    Signs and Symptoms (as evidenced by):   NPO with trickle TF    Interventions/Recommendations (treatment strategy):  Collaboration of nutrition care with other providers    Nutrition Diagnosis Status:   New    Monitor and Evaluation  Food and Nutrient Intake: energy intake, enteral nutrition intake  Food and Nutrient Adminstration: enteral and parenteral nutrition administration  Anthropometric Measurements: weight, weight change  Biochemical Data, Medical Tests and Procedures: electrolyte and renal panel, gastrointestinal profile, glucose/endocrine profile, inflammatory profile  Nutrition-Focused Physical Findings: overall appearance     Nutrition Follow-Up  RD Follow-up?: Yes

## 2019-08-06 NOTE — PROGRESS NOTES
Dr. JAZMYN Ta with nephrology at bedside. Expressed concerns of pt's persistent ectopy and Vtach on cardiac monitoring and most recent electrolytes. Orders to draw another renal function panel and Mg @ 1900.

## 2019-08-06 NOTE — PROGRESS NOTES
Notified Dr. Knapp with Critical Care of 6 beat run of V-Tach on cardiac monitoring. K 3.6. Orders to notify Nephrology.    Paged NP on call with Nephrology. Notified of K 3.6 and 6 beat run of V-Tach. No new orders at this time.

## 2019-08-06 NOTE — SUBJECTIVE & OBJECTIVE
Interval History: Pt seen on SLED this morning and is net negative 384ml for the past 24hrs. Improvement seen in electrolytes. Pt remains sedated and ventilated, on Fi02 of 35%. Pressor requirements have increased and vaso was added in addition to levo.     Review of patient's allergies indicates:   Allergen Reactions    Bactrim [sulfamethoxazole-trimethoprim] Other (See Comments)     Causes renal failure    Nsaids (non-steroidal anti-inflammatory drug) Other (See Comments)     Renal failure     Current Facility-Administered Medications   Medication Frequency    0.9%  NaCl infusion (CRRT USE ONLY) Continuous    acetaminophen tablet 650 mg Q4H PRN    albuterol-ipratropium 2.5 mg-0.5 mg/3 mL nebulizer solution 3 mL Q6H PRN    chlorhexidine 0.12 % solution 15 mL BID    cinacalcet tablet 30 mg Daily with breakfast    clopidogrel tablet 75 mg Daily    dextrose 10% (D10W) Bolus PRN    dextrose 10% (D10W) Bolus PRN    dextrose 50% injection 25 g PRN    fentaNYL 2500 mcg in 0.9% sodium chloride 250 mL infusion premix (titrating) Continuous    fentaNYL injection 50 mcg Q1H PRN    fludrocortisone tablet 100 mcg Daily    glucagon (human recombinant) injection 1 mg PRN    glucose chewable tablet 16 g PRN    glucose chewable tablet 24 g PRN    heparin 25,000 units in dextrose 5% (100 units/ml) IV bolus from bag - ADDITIONAL PRN BOLUS - 30 units/kg PRN    heparin 25,000 units in dextrose 5% (100 units/ml) IV bolus from bag - ADDITIONAL PRN BOLUS - 60 units/kg PRN    heparin 25,000 units in dextrose 5% 250 mL (100 units/mL) infusion LOW INTENSITY nomogram - [facility] Continuous    hydrocortisone sodium succinate injection 100 mg Q8H    insulin aspart U-100 pen 0-5 Units QID (AC + HS) PRN    levETIRAcetam tablet 500 mg BID    LORazepam tablet 0.5 mg Q8H PRN    magnesium sulfate 2g in water 50mL IVPB (premix) PRN    miconazole NITRATE 2 % top powder BID    midodrine tablet 10 mg TID    norepinephrine 4 mg in  dextrose 5% 250 mL infusion (premix) (titrating) Continuous    ondansetron disintegrating tablet 8 mg Q8H PRN    pantoprazole suspension 40 mg Daily    piperacillin-tazobactam 4.5 g in sodium chloride 0.9% 100 mL IVPB (ready to mix system) Q8H    prednisoLONE acetate 1 % ophthalmic suspension 1 drop TID    propofol (DIPRIVAN) 10 mg/mL infusion Continuous    senna-docusate 8.6-50 mg per tablet 1 tablet BID    sevelamer carbonate tablet 800 mg Daily with dinner    sodium chloride 0.9% flush 10 mL PRN    sodium phosphate 20.01 mmol in dextrose 5 % 250 mL IVPB PRN    sodium phosphate 30 mmol in dextrose 5 % 250 mL IVPB PRN    sodium phosphate 39.99 mmol in dextrose 5 % 250 mL IVPB PRN    tiZANidine tablet 4 mg Nightly PRN    vasopressin (PITRESSIN) 0.2 Units/mL in dextrose 5 % 100 mL infusion Continuous     Facility-Administered Medications Ordered in Other Encounters   Medication Frequency    ceFAZolin injection 2 g On Call Procedure    lidocaine (PF) 10 mg/ml (1%) injection 10 mg Once    mupirocin 2 % ointment On Call Procedure    sodium chloride 0.9% flush 10 mL PRN       Objective:     Vital Signs (Most Recent):  Temp: 97.2 °F (36.2 °C) (08/06/19 1200)  Pulse: 70 (08/06/19 1200)  Resp: (!) 23 (08/05/19 0740)  BP: 96/60 (08/06/19 1200)  SpO2: 100 % (08/06/19 1200)  O2 Device (Oxygen Therapy): ventilator (08/06/19 1200) Vital Signs (24h Range):  Temp:  [91.9 °F (33.3 °C)-100.4 °F (38 °C)] 97.2 °F (36.2 °C)  Pulse:  [68-75] 70  SpO2:  [81 %-100 %] 100 %  BP: ()/(55-69) 96/60  Arterial Line BP: ()/(43-98) 100/59     Weight: 110 kg (242 lb 8.1 oz) (08/06/19 0400)  Body mass index is 39.14 kg/m².  Body surface area is 2.26 meters squared.    I/O last 3 completed shifts:  In: 8924.4 [I.V.:8434.4; NG/GT:490]  Out: 9717 [Drains:400; Other:9317]    Physical Exam   Constitutional: She is oriented to person, place, and time. She appears well-developed and well-nourished. No distress.   HENT:   Head:  Normocephalic and atraumatic.   Eyes: Pupils are equal, round, and reactive to light. Conjunctivae and EOM are normal.   Neck: Normal range of motion. Neck supple.   Cardiovascular: Normal rate, regular rhythm and normal heart sounds.   Pulmonary/Chest: Effort normal. No stridor. No respiratory distress. She has no wheezes. She has rales.   Abdominal: Soft. Bowel sounds are normal.   Musculoskeletal: Normal range of motion. She exhibits edema. She exhibits no tenderness or deformity.   Neurological: She is alert and oriented to person, place, and time.   Skin: Skin is warm and dry. She is not diaphoretic.   Psychiatric:   MAGNUS, Pt intubated/sedated       Significant Labs:  ABGs:   Recent Labs   Lab 08/05/19  1055   PH 7.289*   PCO2 51.1*   HCO3 24.5   POCSATURATED 96   BE -2     CBC:   Recent Labs   Lab 08/06/19  0300   WBC 13.37*  13.37*   RBC 3.63*  3.63*   HGB 9.1*  9.1*   HCT 30.7*  30.7*     182   MCV 85  85   MCH 25.1*  25.1*   MCHC 29.6*  29.6*     CMP:   Recent Labs   Lab 08/06/19  0300   *  166*   CALCIUM 8.7  8.7   ALBUMIN 2.5*  2.5*   PROT 6.8     136   K 4.6  4.6   CO2 23  23     100   BUN 4*  4*   CREATININE 0.9  0.9   ALKPHOS 129   ALT 18   AST 40   BILITOT 1.7*     All labs within the past 24 hours have been reviewed.

## 2019-08-06 NOTE — ASSESSMENT & PLAN NOTE
Outpatient HD unit: [facility]   -Nephrologist: Patient does not know  -HD tx days: TThS  -HD tx time: 4hrs 45mins    -HD access: LUE AVG  -HD modality: iHD  -Residual urine: Minimal  -EDW: 109 kg     Plan:  - Transition to SCUF    - Hourly -400cc  - Pt on heparin infusion, no need for citrate   - Strict I/Os and chart  - Will follow closely

## 2019-08-06 NOTE — ASSESSMENT & PLAN NOTE
On 10 mg midodrine TID at home  -Continue midodrine  -Levophed at 0.1 post intubation, will wean as tolerated  Given persistent hypotension and hypoglycemia, started on broad spectrum abx (Vanc, Zosyn) for concern of sepsis   08/05 Levophed up to 0.16, will add vaso at 0.2, she has now become hypothermic, ordering EKG, Troponin, and perform bed side US to assess cardiac function   08/06 Levophed at 0.13, vaso 0.04 was added, told nursing to turn off vaso, and titrate according to parameters as listed above

## 2019-08-06 NOTE — PLAN OF CARE
Problem: Adult Inpatient Plan of Care  Goal: Plan of Care Review  Outcome: Ongoing (interventions implemented as appropriate)  No acute events this shift. Afebrile. Rectal temp probe in place. Wen hugger in place for hypothermia.  Pt moves all extremities on sedation vacation but does not follow commands.  Vent: AC Rate 20, , FiO2 30, PEEP 8, SpO2 >95%  HR 60-70s, NSR with frequent ectopy. Primary team aware. Recheck labs at 1900 per Nephrology. Permanent pacemaker in place.  Gtts: Propofol & Fentanyl for sedation/comfort. Vaso off per Dr. Cordero. Levo concentration increased to 16 mg/250 ml, currently at 0.22 mcg/kg/min to maintain MAP > 65. Heparin at 9 units/kg/hr, aPTT therapeutic this AM, next check tomorrow AM.  Tube feeds at 10 ml/hr, formula changed to Peptamen for a goal of 55 ml/hr, residuals checked q4h, max of 50 ml.  CRRT, switched to SCUF today, UFR at 400 ml/hr, pt remains anuric. CVP 16-15-11.  Echo done today EF 30%.  Pt turned q2h to prevent skin breakdown.  Family remains at bedside. Plan of care reviewed with family. All questions and concerns addressed.

## 2019-08-06 NOTE — PLAN OF CARE
Problem: Adult Inpatient Plan of Care  Goal: Plan of Care Review  Recommendations  Recommendation/Intervention:   1. Recommend modifying TF to Peptamen Intense VHP at a goal rate of 55 mL/hr - to provide 1320 kcal/day (1748 kcal w/propofol), 121g protein/day, and 1109mL free fluid/day.   2. When able to extubate, ADAT to Renal with texture per SLP.   RD to monitor.    Goals: Patient to meet > 85% EEN and EPN by RD follow-up  Nutrition Goal Status: new    Full assessment completed, see RD Note 8/6/2019.

## 2019-08-06 NOTE — ASSESSMENT & PLAN NOTE
Patient has hx of HF (EF 35%). CXR in the ED suggestive of HF. BNP 1424 and troponins 0.156.  Patient is unsure if she takes her lisinopril at home.  -repeat trop 0.153  08/04 At time of step up appears volume overloaded, pitting edema and rales b/l lower lobes  -Awaiting SCUF trial to remove volume   08/06-Chest Xray does not show interval difference of fluid overload, continuing to try to get off volume. Plan to get repeat TTE today

## 2019-08-07 NOTE — ASSESSMENT & PLAN NOTE
Patient presents with A fib in the ED. Patient is on chronic warfarin. S/P ablation multiple times  Plan  -Continue heparin gtt  -Pacemaker in place

## 2019-08-07 NOTE — PLAN OF CARE
Problem: Adult Inpatient Plan of Care  Goal: Plan of Care Review  Outcome: Ongoing (interventions implemented as appropriate)  Pt unable to follow commands. Pt on A/C 40% FiO2 5 PEEP, O2 sats >92%. HR 70s NSR, permanent pacemaker set at 70. CVP 11-12. MAP maintained >65. Vaso @ 0.04U/min, levo @ 0.08 mcg/kg/min, propofol @ 5mcg/kg/min, precedex @ 0.4 mcg/kg/h, and heparin @ 9U/kg/h. TF @ 20cc/h, residuals 100-225; MD aware. CRRT SCUF continued with , pt tolerating well. Accuchecks done Q4h. CT of head done today. EEG done today. Plan of care reviewed with pt and family. VSS at this time. Will continue to monitor. See flowsheet for full assessment details.

## 2019-08-07 NOTE — PROGRESS NOTES
[facility]  Nephrology  Progress Note    Patient Name: Sisi Medel  MRN: [id number]  Admission Date: 8/3/2019  Hospital Length of Stay: 4 days  Attending Provider: Bang Cordero MD   Primary Care Physician: Carlos A Caputo MD  Principal Problem:Acute on chronic respiratory failure with hypoxia and hypercapnia      Interval History: Pt seen on SCUF this morning and is net negative about 2200ml for the past 24hrs. Electrolytes stable. Pt remains sedated and ventilated, on Fi02 of 30%. Still requiring levo and vaso to maintain adequate BP.     Review of patient's allergies indicates:   Allergen Reactions    Bactrim [sulfamethoxazole-trimethoprim] Other (See Comments)     Causes renal failure    Nsaids (non-steroidal anti-inflammatory drug) Other (See Comments)     Renal failure     Current Facility-Administered Medications   Medication Frequency    0.9%  NaCl infusion (CRRT USE ONLY) Continuous    acetaminophen tablet 650 mg Q4H PRN    albuterol-ipratropium 2.5 mg-0.5 mg/3 mL nebulizer solution 3 mL Q6H PRN    chlorhexidine 0.12 % solution 15 mL BID    cinacalcet tablet 30 mg Daily with breakfast    clopidogrel tablet 75 mg Daily    dexmedetomidine (PRECEDEX) 400mcg/100mL 0.9% NaCL infusion Continuous    dextrose 10% (D10W) Bolus PRN    dextrose 10% (D10W) Bolus PRN    dextrose 50% injection 25 g PRN    fentaNYL 2500 mcg in 0.9% sodium chloride 250 mL infusion premix (titrating) Continuous    fentaNYL injection 50 mcg Q1H PRN    fludrocortisone tablet 100 mcg Daily    glucagon (human recombinant) injection 1 mg PRN    glucose chewable tablet 16 g PRN    glucose chewable tablet 24 g PRN    heparin 25,000 units in dextrose 5% (100 units/ml) IV bolus from bag - ADDITIONAL PRN BOLUS - 30 units/kg PRN    heparin 25,000 units in dextrose 5% (100 units/ml) IV bolus from bag - ADDITIONAL PRN BOLUS - 60 units/kg PRN    heparin 25,000 units in dextrose 5% 250 mL (100  units/mL) infusion LOW INTENSITY nomogram - [facility] Continuous    hydrocortisone sodium succinate injection 100 mg Q8H    insulin aspart U-100 pen 0-5 Units QID (AC + HS) PRN    levETIRAcetam tablet 500 mg BID    LORazepam tablet 0.5 mg Q8H PRN    magnesium sulfate 2g in water 50mL IVPB (premix) PRN    miconazole NITRATE 2 % top powder BID    midodrine tablet 10 mg TID    norepinephrine 16 mg in dextrose 5 % 250 mL infusion Continuous    ondansetron disintegrating tablet 8 mg Q8H PRN    pantoprazole suspension 40 mg Daily    piperacillin-tazobactam 4.5 g in sodium chloride 0.9% 100 mL IVPB (ready to mix system) Q12H    prednisoLONE acetate 1 % ophthalmic suspension 1 drop TID    propofol (DIPRIVAN) 10 mg/mL infusion Continuous    senna-docusate 8.6-50 mg per tablet 1 tablet BID    sevelamer carbonate tablet 800 mg Daily with dinner    sodium chloride 0.9% flush 10 mL PRN    sodium phosphate 20.01 mmol in dextrose 5 % 250 mL IVPB PRN    sodium phosphate 30 mmol in dextrose 5 % 250 mL IVPB PRN    sodium phosphate 39.99 mmol in dextrose 5 % 250 mL IVPB PRN    tiZANidine tablet 4 mg Nightly PRN    vasopressin (PITRESSIN) 0.2 Units/mL in dextrose 5 % 100 mL infusion Continuous     Facility-Administered Medications Ordered in Other Encounters   Medication Frequency    ceFAZolin injection 2 g On Call Procedure    lidocaine (PF) 10 mg/ml (1%) injection 10 mg Once    mupirocin 2 % ointment On Call Procedure    sodium chloride 0.9% flush 10 mL PRN       Objective:     Vital Signs (Most Recent):  Temp: 97.9 °F (36.6 °C) (08/07/19 1330)  Pulse: 72 (08/07/19 1330)  Resp: (!) 23 (08/05/19 0740)  BP: 90/61 (08/07/19 1330)  SpO2: (!) 93 % (08/07/19 1330)  O2 Device (Oxygen Therapy): ventilator (08/07/19 1326) Vital Signs (24h Range):  Temp:  [95.9 °F (35.5 °C)-98.6 °F (37 °C)] 97.9 °F (36.6 °C)  Pulse:  [56-78] 72  SpO2:  [87 %-100 %] 93 %  BP: ()/(52-74) 90/61  Arterial Line BP: ()/(48-65) 96/58      Weight: 109.8 kg (242 lb) (08/06/19 1600)  Body mass index is 37.9 kg/m².  Body surface area is 2.28 meters squared.    I/O last 3 completed shifts:  In: 33033 [I.V.:72704; NG/GT:560]  Out: 02995 [Other:30027]    Physical Exam   Constitutional: She is oriented to person, place, and time. She appears well-developed and well-nourished. No distress.   HENT:   Head: Normocephalic and atraumatic.   Eyes: Pupils are equal, round, and reactive to light. Conjunctivae and EOM are normal.   Neck: Normal range of motion. Neck supple.   Cardiovascular: Normal rate, regular rhythm and normal heart sounds.   Pulmonary/Chest: Effort normal. No stridor. No respiratory distress. She has no wheezes. She has rales.   Abdominal: Soft. Bowel sounds are normal.   Musculoskeletal: Normal range of motion. She exhibits edema. She exhibits no tenderness or deformity.   Neurological: She is alert and oriented to person, place, and time.   Skin: Skin is warm and dry. She is not diaphoretic.   Psychiatric:   MAGNUS, Pt intubated/sedated       Significant Labs:  ABGs:   Recent Labs   Lab 08/05/19  1055   PH 7.289*   PCO2 51.1*   HCO3 24.5   POCSATURATED 96   BE -2     CBC:   Recent Labs   Lab 08/07/19  0300   WBC 22.10*   RBC 3.52*   HGB 8.7*   HCT 31.3*   *   MCV 89   MCH 24.7*   MCHC 27.8*     CMP:   Recent Labs   Lab 08/07/19  0300   *  201*   CALCIUM 8.8  8.8   ALBUMIN 2.4*  2.4*   PROT 6.8     136   K 4.3  4.3   CO2 22*  22*     101   BUN 16  16   CREATININE 1.6*  1.6*   ALKPHOS 124   ALT 16   AST 30   BILITOT 1.3*     All labs within the past 24 hours have been reviewed.         Assessment/Plan:     ESRD on hemodialysis  Outpatient HD unit: [facility]   -Nephrologist: Patient does not know  -HD tx days: TThS  -HD tx time: 4hrs 45mins    -HD access: LUE AVG  -HD modality: iHD  -Residual urine: Minimal  -EDW: 109 kg     Plan:  - Continue SCUF    - Hourly -400cc  - Pt on heparin infusion, no  need for citrate   - Strict I/Os and chart  - Will follow closely        Thank you for your consult. I will follow-up with patient. Please contact us if you have any additional questions.    Dinh Rodriguez NP  Nephrology  [facility]

## 2019-08-07 NOTE — ASSESSMENT & PLAN NOTE
- no evidence of PJP ppx on MAR  - on stress dose during septic shock   - will need PJP PPX if remaining on prolonged steroids for sarcoid

## 2019-08-07 NOTE — ASSESSMENT & PLAN NOTE
- continue CRRT for volume removal, CXR improving, vasopressor requirements improving  - appreciate nephro assistance

## 2019-08-07 NOTE — ASSESSMENT & PLAN NOTE
Mental status questionable despite weaning sedations  - change sedation to propofol and precedex  - SAT 8/8 AM  - CT H and EEG now

## 2019-08-07 NOTE — SUBJECTIVE & OBJECTIVE
Interval History/Significant Events: TTE performed with EF 30%, concentric LVH, G3DD, eLAP, LAE/JAGUAR, moderately reduced RV systolic function and RVSP estimated to be at least 50mm Hg. Levo decreasing, vaso remains on. Failed SAT / sedation wean this AM.     Review of Systems   Unable to perform ROS: Intubated     Objective:     Vital Signs (Most Recent):  Temp: 98.1 °F (36.7 °C) (08/07/19 0738)  Pulse: 71 (08/07/19 0738)  Resp: (!) 23 (08/05/19 0740)  BP: (!) 87/70 (08/07/19 0738)  SpO2: 95 % (08/07/19 0738) Vital Signs (24h Range):  Temp:  [95.9 °F (35.5 °C)-98.6 °F (37 °C)] 98.1 °F (36.7 °C)  Pulse:  [56-73] 71  SpO2:  [91 %-100 %] 95 %  BP: ()/(52-70) 87/70  Arterial Line BP: ()/(48-65) 96/58   Weight: 109.8 kg (242 lb)  Body mass index is 37.9 kg/m².      Intake/Output Summary (Last 24 hours) at 8/7/2019 0915  Last data filed at 8/7/2019 0700  Gross per 24 hour   Intake 7150 ml   Output 8732 ml   Net -1582 ml       Physical Exam   Constitutional: She is oriented to person, place, and time.   Morbidly obese AA female, intubated and sedated, currently on hemodialysis    HENT:   Head: Normocephalic and atraumatic.   Eyes: Pupils are equal, round, and reactive to light. Conjunctivae and EOM are normal.   Neck: Normal range of motion. Neck supple.   Cardiovascular: Normal rate, regular rhythm and normal heart sounds.   Pulmonary/Chest: Effort normal. No stridor. No respiratory distress. She has no wheezes. She has rales.   Abdominal: Soft. Bowel sounds are normal.   Musculoskeletal: Normal range of motion. She exhibits edema. She exhibits no tenderness or deformity.   Neurological: She is alert and oriented to person, place, and time.   Skin: Skin is warm and dry.   Psychiatric:   MAGNUS, Pt intubated/sedated       Vents:  Vent Mode: A/C (08/07/19 0738)  Ventilator Initiated: Yes (08/04/19 1139)  Set Rate: 20 bmp (08/07/19 0738)  Vt Set: 400 mL (08/07/19 0738)  PEEP/CPAP: 5 cmH20 (08/07/19 0738)  Oxygen  Concentration (%): 29 (08/07/19 0738)  Peak Airway Pressure: 23 cmH2O (08/07/19 0738)  Plateau Pressure: 31 cmH20 (08/07/19 0738)  Total Ve: 13.2 mL (08/07/19 0738)  F/VT Ratio<105 (RSBI): (!) 57.42 (08/05/19 0531)  Lines/Drains/Airways     Central Venous Catheter Line                 Trialysis (Dialysis) Catheter 08/04/19 right internal jugular 3 days         Percutaneous Central Line Insertion/Assessment - triple lumen  08/04/19 1803 left internal jugular 2 days          Drain                 Hemodialysis AV Graft Left upper arm -- days         NG/OG Tube 08/04/19 1230 2 days          Airway                 Airway - Non-Surgical 08/04/19 1139 Endotracheal Tube 2 days          Arterial Line                 Arterial Line 2 days          Peripheral Intravenous Line                 Peripheral IV - Single Lumen 08/04/19 1141 22 G Right Hand 2 days              Significant Labs:    CBC/Anemia Profile:  Recent Labs   Lab 08/05/19  1120 08/06/19  0300 08/07/19  0300   WBC 9.85 13.37*  13.37* 22.10*   HGB 9.1* 9.1*  9.1* 8.7*   HCT 31.9* 30.7*  30.7* 31.3*    182  182 145*   MCV 89 85  85 89   RDW 21.4* 21.3*  21.3* 21.5*        Chemistries:  Recent Labs   Lab 08/06/19  0300  08/06/19  1940 08/06/19  2300 08/07/19  0300     136   < > 136 135* 136  136   K 4.6  4.6   < > 4.2 4.3 4.3  4.3     100   < > 100 101 101  101   CO2 23  23   < > 22* 18* 22*  22*   BUN 4*  4*   < > 11 13 16  16   CREATININE 0.9  0.9   < > 1.2 1.4 1.6*  1.6*   CALCIUM 8.7  8.7   < > 8.6* 8.6* 8.8  8.8   ALBUMIN 2.5*  2.5*   < > 2.4* 2.4* 2.4*  2.4*   PROT 6.8  --   --   --  6.8   BILITOT 1.7*  --   --   --  1.3*   ALKPHOS 129  --   --   --  124   ALT 18  --   --   --  16   AST 40  --   --   --  30   MG 1.9   < > 2.5 2.5 2.5   PHOS 2.2*   < > 3.9 4.3 4.6*  4.6*    < > = values in this interval not displayed.         Significant Imaging:  I have reviewed all pertinent imaging results/findings within the past 24  hours.

## 2019-08-07 NOTE — PROGRESS NOTES
Pt's margaret not reading accurately. Margaret trouble-shooted and unable to fix. Called MD [facility] said okay to remove margaret. VSS. Will continue to monitor.

## 2019-08-07 NOTE — ASSESSMENT & PLAN NOTE
Patient presents with A fib in the ED. Patient is on chronic warfarin. S/P ablation multiple times.  -coarse breath sounds L>R, remains on low oxygen and PEEP but failing SAT, CXR improving  - net negative 2L off in last 24 hour  - continue on zosyn for pseudomonas in sputum culture   - continue CRRT for volume removal

## 2019-08-07 NOTE — PROGRESS NOTES
[facility]  Critical Care Medicine  Progress Note    Patient Name: Sisi Medel  MRN: [id number]  Admission Date: 8/3/2019  Hospital Length of Stay: 4 days  Code Status: Full Code  Attending Provider: Bang Cordero MD  Primary Care Provider: Carlos A Caputo MD   Principal Problem: Acute on chronic respiratory failure with hypoxia and hypercapnia    Subjective:     HPI:  Ms Medel  57 yo f with ESRD on HD, cervical radiculopathy, CHF, Chronic resp failure with hypoxia, T2DM, DDD, paroxysmal Afib, seizure, shingles, thyroid disorder here for a 3 day history of cough and SOB. Patient was at dialysis and had to stop at 1.6L due to resp distress. She was put in a nonrebreather at 12L of oxygen. She was transported via EMS. Overnight she required BiPAP for worsening SOB. Rapid response was called this morning because she was hypotensive 70s/50s. Dr. Rosas, MICU team, went and recheck BP was 107/59. She later became hypertensive again and continued to be on BiPAP, she was stepped up for dialysis, BP monitoring, possible vasopressor requirement and further respiratory monitoring.     During step up she was persistently hypoglycemic and hypotensive, peripheral vascular access was lost and a left TLC was placed at bedside. It was determined she could not tolerated her normal HD and she had tenuous respiratory status, so decision was made to intubate. She has been started on broad spectrum abx, and right HD IJ catheter was placed. Nephro been made aware and SCUF planned for today.     Hospital/ICU Course:  08/04 Stepped up to ICU, intubated, started on broad spectrum abx, HD catheter placed, plan is for SCUF today.   08/05 SCUF started in the AM, levo requirement of 0.06. Still intubated. Plan to transition to SLED. Blood cultures negative to date, covered with Vanc and Zosyn   08/06 On SLED today, had increase of pressor requirement was at 0.13 levo and .04 vaso, increase white count 13.3  and temp overnight 100.4. Sputum culture grew pseudomonas, d/c vancomycin, plan is to transition to SCUF.   Patient continued on levophed and vasopressin, with decreasing levophed requirements. Continued on zosyn for pseudomonas growing in respiratory cultures. SAT attempted this morning with significant tachypnea and agitation. Propofol reinitiated at prior rate, fentanyl at 75. Patient has corneal reflexes, spontaneously opens eyes but mental status is not at baseline - EEG and CTH ordered.     Interval History/Significant Events: TTE performed with EF 30%, concentric LVH, G3DD, eLAP, LAE/JAGUAR, moderately reduced RV systolic function and RVSP estimated to be at least 50mm Hg. Levo decreasing, vaso remains on. Failed SAT / sedation wean this AM.     Review of Systems   Unable to perform ROS: Intubated     Objective:     Vital Signs (Most Recent):  Temp: 98.1 °F (36.7 °C) (08/07/19 0738)  Pulse: 71 (08/07/19 0738)  Resp: (!) 23 (08/05/19 0740)  BP: (!) 87/70 (08/07/19 0738)  SpO2: 95 % (08/07/19 0738) Vital Signs (24h Range):  Temp:  [95.9 °F (35.5 °C)-98.6 °F (37 °C)] 98.1 °F (36.7 °C)  Pulse:  [56-73] 71  SpO2:  [91 %-100 %] 95 %  BP: ()/(52-70) 87/70  Arterial Line BP: ()/(48-65) 96/58   Weight: 109.8 kg (242 lb)  Body mass index is 37.9 kg/m².      Intake/Output Summary (Last 24 hours) at 8/7/2019 0915  Last data filed at 8/7/2019 0700  Gross per 24 hour   Intake 7150 ml   Output 8732 ml   Net -1582 ml       Physical Exam   Constitutional: She is oriented to person, place, and time.   Morbidly obese AA female, intubated and sedated, currently on hemodialysis    HENT:   Head: Normocephalic and atraumatic.   Eyes: Pupils are equal, round, and reactive to light. Conjunctivae and EOM are normal.   Neck: Normal range of motion. Neck supple.   Cardiovascular: Normal rate, regular rhythm and normal heart sounds.   Pulmonary/Chest: Effort normal. No stridor. No respiratory distress. She has no wheezes. She  has rales.   Abdominal: Soft. Bowel sounds are normal.   Musculoskeletal: Normal range of motion. She exhibits edema. She exhibits no tenderness or deformity.   Neurological: She is alert and oriented to person, place, and time.   Skin: Skin is warm and dry.   Psychiatric:   MAGNUS, Pt intubated/sedated       Vents:  Vent Mode: A/C (08/07/19 0738)  Ventilator Initiated: Yes (08/04/19 1139)  Set Rate: 20 bmp (08/07/19 0738)  Vt Set: 400 mL (08/07/19 0738)  PEEP/CPAP: 5 cmH20 (08/07/19 0738)  Oxygen Concentration (%): 29 (08/07/19 0738)  Peak Airway Pressure: 23 cmH2O (08/07/19 0738)  Plateau Pressure: 31 cmH20 (08/07/19 0738)  Total Ve: 13.2 mL (08/07/19 0738)  F/VT Ratio<105 (RSBI): (!) 57.42 (08/05/19 0531)  Lines/Drains/Airways     Central Venous Catheter Line                 Trialysis (Dialysis) Catheter 08/04/19 right internal jugular 3 days         Percutaneous Central Line Insertion/Assessment - triple lumen  08/04/19 1803 left internal jugular 2 days          Drain                 Hemodialysis AV Graft Left upper arm -- days         NG/OG Tube 08/04/19 1230 2 days          Airway                 Airway - Non-Surgical 08/04/19 1139 Endotracheal Tube 2 days          Arterial Line                 Arterial Line 2 days          Peripheral Intravenous Line                 Peripheral IV - Single Lumen 08/04/19 1141 22 G Right Hand 2 days              Significant Labs:    CBC/Anemia Profile:  Recent Labs   Lab 08/05/19  1120 08/06/19  0300 08/07/19  0300   WBC 9.85 13.37*  13.37* 22.10*   HGB 9.1* 9.1*  9.1* 8.7*   HCT 31.9* 30.7*  30.7* 31.3*    182  182 145*   MCV 89 85  85 89   RDW 21.4* 21.3*  21.3* 21.5*        Chemistries:  Recent Labs   Lab 08/06/19  0300  08/06/19  1940 08/06/19  2300 08/07/19  0300     136   < > 136 135* 136  136   K 4.6  4.6   < > 4.2 4.3 4.3  4.3     100   < > 100 101 101  101   CO2 23  23   < > 22* 18* 22*  22*   BUN 4*  4*   < > 11 13 16  16   CREATININE  0.9  0.9   < > 1.2 1.4 1.6*  1.6*   CALCIUM 8.7  8.7   < > 8.6* 8.6* 8.8  8.8   ALBUMIN 2.5*  2.5*   < > 2.4* 2.4* 2.4*  2.4*   PROT 6.8  --   --   --  6.8   BILITOT 1.7*  --   --   --  1.3*   ALKPHOS 129  --   --   --  124   ALT 18  --   --   --  16   AST 40  --   --   --  30   MG 1.9   < > 2.5 2.5 2.5   PHOS 2.2*   < > 3.9 4.3 4.6*  4.6*    < > = values in this interval not displayed.         Significant Imaging:  I have reviewed all pertinent imaging results/findings within the past 24 hours.      ABG  Recent Labs   Lab 08/05/19  1055   PH 7.289*   PO2 93   PCO2 51.1*   HCO3 24.5   BE -2     Assessment/Plan:     Neuro  Encephalopathy  Mental status questionable despite weaning sedations  - change sedation to propofol and precedex  - SAT 8/8 AM  - CT H and EEG now     Epilepsy  Continue keppra  - follow EEG    Pulmonary  * Acute on chronic respiratory failure with hypoxia and hypercapnia  Patient presents with A fib in the ED. Patient is on chronic warfarin. S/P ablation multiple times.  -coarse breath sounds L>R, remains on low oxygen and PEEP but failing SAT, CXR improving  - net negative 2L off in last 24 hour  - continue on zosyn for pseudomonas in sputum culture   - continue CRRT for volume removal     Cardiac/Vascular  Permanent atrial fibrillation    Patient presents with A fib in the ED. Patient is on chronic warfarin. S/P ablation multiple times  Plan  -Continue heparin gtt  -Pacemaker in place       Acute on chronic systolic congestive heart failure  Patient has hx of HF (EF 35%). CXR in the ED suggestive of HF. BNP 1424 and troponins 0.156.  Patient is unsure if she takes her lisinopril at home.  - repeat TTE EF 30%, LVH, G3DD, LAE/JAGUAR, moderate RV systolic dysfunction   - continue CRRT for volume removal   - decreasing pressor requirements     Hypotension  On 10 mg midodrine TID at home  -Continue midodrine  - levophed/vaso - titrate to map 65      Renal/  ESRD on hemodialysis  - continue CRRT  for volume removal, CXR improving, vasopressor requirements improving  - appreciate nephro assistance      Immunology/Multi System  Pulmonary sarcoidosis  Stress dose steroids while in septic shock     Hematology  Current use of long term anticoagulation  - heparin gtt while IP  - no evidence of hepatic dysfunction on labs - minor bili bump      Other  Class 2 severe obesity due to excess calories with serious comorbidity and body mass index (BMI) of 38.0 to 38.9 in adult  Will need to revisit weight loss when improving  Significant pannus    Current chronic use of systemic steroids  - no evidence of PJP ppx on MAR  - on stress dose during septic shock   - will need PJP PPX if remaining on prolonged steroids for sarcoid        Critical Care Daily Checklist:    A: Awake: RASS Goal/Actual Goal: RASS Goal: -2-->light sedation  Actual: Horton Agitation Sedation Scale (RASS): Moderate sedation   B: Spontaneous Breathing Trial Performed?     C: SAT & SBT Coordinated?  Yes, failed SAT                      D: Delirium: CAM-ICU Overall CAM-ICU: Negative   E: Early Mobility Performed? No   F: Feeding Goal: Goals: Patient to meet > 85% EEN and EPN by RD follow-up  Status: Nutrition Goal Status: new   Current Diet Order   No orders of the defined types were placed in this encounter.      AS: Analgesia/Sedation Propofol, precedex   T: Thromboembolic Prophylaxis Hep gtt   H: HOB > 300 YES   U: Stress Ulcer Prophylaxis (if needed) PPI    G: Glucose Control    B: Bowel Function     I: Indwelling Catheter (Lines & Yepez) Necessity CVC x 2, PIV ETT,    D: De-escalation of Antimicrobials/Pharmacotherapies On zosyn awaiting sensitivities    Plan for the day/ETD CTH and EEG    Code Status:  Family/Goals of Care: Full Code         Critical secondary to Patient has a condition that poses threat to life and bodily function: Severe Respiratory Distress and septic shock       Critical care was time spent personally by me on the following  activities: development of treatment plan with patient or surrogate and bedside caregivers, discussions with consultants, evaluation of patient's response to treatment, examination of patient, ordering and performing treatments and interventions, ordering and review of laboratory studies, ordering and review of radiographic studies, pulse oximetry, re-evaluation of patient's condition. This critical care time did not overlap with that of any other provider or involve time for any procedures.     Jeancarlos Gomez MD  Critical Care Medicine  [facility]

## 2019-08-07 NOTE — SUBJECTIVE & OBJECTIVE
Interval History: Pt seen on SCUF this morning and is net negative about 2200ml for the past 24hrs. Electrolytes stable. Pt remains sedated and ventilated, on Fi02 of 30%. Still requiring levo and vaso to maintain adequate BP.     Review of patient's allergies indicates:   Allergen Reactions    Bactrim [sulfamethoxazole-trimethoprim] Other (See Comments)     Causes renal failure    Nsaids (non-steroidal anti-inflammatory drug) Other (See Comments)     Renal failure     Current Facility-Administered Medications   Medication Frequency    0.9%  NaCl infusion (CRRT USE ONLY) Continuous    acetaminophen tablet 650 mg Q4H PRN    albuterol-ipratropium 2.5 mg-0.5 mg/3 mL nebulizer solution 3 mL Q6H PRN    chlorhexidine 0.12 % solution 15 mL BID    cinacalcet tablet 30 mg Daily with breakfast    clopidogrel tablet 75 mg Daily    dexmedetomidine (PRECEDEX) 400mcg/100mL 0.9% NaCL infusion Continuous    dextrose 10% (D10W) Bolus PRN    dextrose 10% (D10W) Bolus PRN    dextrose 50% injection 25 g PRN    fentaNYL 2500 mcg in 0.9% sodium chloride 250 mL infusion premix (titrating) Continuous    fentaNYL injection 50 mcg Q1H PRN    fludrocortisone tablet 100 mcg Daily    glucagon (human recombinant) injection 1 mg PRN    glucose chewable tablet 16 g PRN    glucose chewable tablet 24 g PRN    heparin 25,000 units in dextrose 5% (100 units/ml) IV bolus from bag - ADDITIONAL PRN BOLUS - 30 units/kg PRN    heparin 25,000 units in dextrose 5% (100 units/ml) IV bolus from bag - ADDITIONAL PRN BOLUS - 60 units/kg PRN    heparin 25,000 units in dextrose 5% 250 mL (100 units/mL) infusion LOW INTENSITY nomogram - [facility] Continuous    hydrocortisone sodium succinate injection 100 mg Q8H    insulin aspart U-100 pen 0-5 Units QID (AC + HS) PRN    levETIRAcetam tablet 500 mg BID    LORazepam tablet 0.5 mg Q8H PRN    magnesium sulfate 2g in water 50mL IVPB (premix) PRN    miconazole NITRATE 2 % top powder BID    midodrine  tablet 10 mg TID    norepinephrine 16 mg in dextrose 5 % 250 mL infusion Continuous    ondansetron disintegrating tablet 8 mg Q8H PRN    pantoprazole suspension 40 mg Daily    piperacillin-tazobactam 4.5 g in sodium chloride 0.9% 100 mL IVPB (ready to mix system) Q12H    prednisoLONE acetate 1 % ophthalmic suspension 1 drop TID    propofol (DIPRIVAN) 10 mg/mL infusion Continuous    senna-docusate 8.6-50 mg per tablet 1 tablet BID    sevelamer carbonate tablet 800 mg Daily with dinner    sodium chloride 0.9% flush 10 mL PRN    sodium phosphate 20.01 mmol in dextrose 5 % 250 mL IVPB PRN    sodium phosphate 30 mmol in dextrose 5 % 250 mL IVPB PRN    sodium phosphate 39.99 mmol in dextrose 5 % 250 mL IVPB PRN    tiZANidine tablet 4 mg Nightly PRN    vasopressin (PITRESSIN) 0.2 Units/mL in dextrose 5 % 100 mL infusion Continuous     Facility-Administered Medications Ordered in Other Encounters   Medication Frequency    ceFAZolin injection 2 g On Call Procedure    lidocaine (PF) 10 mg/ml (1%) injection 10 mg Once    mupirocin 2 % ointment On Call Procedure    sodium chloride 0.9% flush 10 mL PRN       Objective:     Vital Signs (Most Recent):  Temp: 97.9 °F (36.6 °C) (08/07/19 1330)  Pulse: 72 (08/07/19 1330)  Resp: (!) 23 (08/05/19 0740)  BP: 90/61 (08/07/19 1330)  SpO2: (!) 93 % (08/07/19 1330)  O2 Device (Oxygen Therapy): ventilator (08/07/19 1326) Vital Signs (24h Range):  Temp:  [95.9 °F (35.5 °C)-98.6 °F (37 °C)] 97.9 °F (36.6 °C)  Pulse:  [56-78] 72  SpO2:  [87 %-100 %] 93 %  BP: ()/(52-74) 90/61  Arterial Line BP: ()/(48-65) 96/58     Weight: 109.8 kg (242 lb) (08/06/19 1600)  Body mass index is 37.9 kg/m².  Body surface area is 2.28 meters squared.    I/O last 3 completed shifts:  In: 63559 [I.V.:52203; NG/GT:560]  Out: 45549 [Other:07104]    Physical Exam   Constitutional: She is oriented to person, place, and time. She appears well-developed and well-nourished. No distress.   HENT:    Head: Normocephalic and atraumatic.   Eyes: Pupils are equal, round, and reactive to light. Conjunctivae and EOM are normal.   Neck: Normal range of motion. Neck supple.   Cardiovascular: Normal rate, regular rhythm and normal heart sounds.   Pulmonary/Chest: Effort normal. No stridor. No respiratory distress. She has no wheezes. She has rales.   Abdominal: Soft. Bowel sounds are normal.   Musculoskeletal: Normal range of motion. She exhibits edema. She exhibits no tenderness or deformity.   Neurological: She is alert and oriented to person, place, and time.   Skin: Skin is warm and dry. She is not diaphoretic.   Psychiatric:   MAGNUS, Pt intubated/sedated       Significant Labs:  ABGs:   Recent Labs   Lab 08/05/19  1055   PH 7.289*   PCO2 51.1*   HCO3 24.5   POCSATURATED 96   BE -2     CBC:   Recent Labs   Lab 08/07/19  0300   WBC 22.10*   RBC 3.52*   HGB 8.7*   HCT 31.3*   *   MCV 89   MCH 24.7*   MCHC 27.8*     CMP:   Recent Labs   Lab 08/07/19  0300   *  201*   CALCIUM 8.8  8.8   ALBUMIN 2.4*  2.4*   PROT 6.8     136   K 4.3  4.3   CO2 22*  22*     101   BUN 16  16   CREATININE 1.6*  1.6*   ALKPHOS 124   ALT 16   AST 30   BILITOT 1.3*     All labs within the past 24 hours have been reviewed.

## 2019-08-07 NOTE — ASSESSMENT & PLAN NOTE
Patient has hx of HF (EF 35%). CXR in the ED suggestive of HF. BNP 1424 and troponins 0.156.  Patient is unsure if she takes her lisinopril at home.  - repeat TTE EF 30%, LVH, G3DD, LAE/JAGUAR, moderate RV systolic dysfunction   - continue CRRT for volume removal   - decreasing pressor requirements

## 2019-08-07 NOTE — ASSESSMENT & PLAN NOTE
Outpatient HD unit: [facility]   -Nephrologist: Patient does not know  -HD tx days: TThS  -HD tx time: 4hrs 45mins    -HD access: LUE AVG  -HD modality: iHD  -Residual urine: Minimal  -EDW: 109 kg     Plan:  - Continue SCUF    - Hourly -400cc  - Pt on heparin infusion, no need for citrate   - Strict I/Os and chart  - Will follow closely

## 2019-08-08 NOTE — PROGRESS NOTES
[facility]  Critical Care Medicine  Progress Note    Patient Name: Sisi Medel  MRN: [id number]  Admission Date: 8/3/2019  Hospital Length of Stay: 5 days  Code Status: Full Code  Attending Provider: Bang Cordero MD  Primary Care Provider: Carlos A Caputo MD   Principal Problem: Acute on chronic respiratory failure with hypoxia and hypercapnia    Subjective:     HPI:  Ms Medel  59 yo f with ESRD on HD, cervical radiculopathy, CHF, Chronic resp failure with hypoxia, T2DM, DDD, paroxysmal Afib, seizure, shingles, thyroid disorder here for a 3 day history of cough and SOB. Patient was at dialysis and had to stop at 1.6L due to resp distress. She was put in a nonrebreather at 12L of oxygen. She was transported via EMS. Overnight she required BiPAP for worsening SOB. Rapid response was called this morning because she was hypotensive 70s/50s. Dr. Rosas, MICU team, went and recheck BP was 107/59. She later became hypertensive again and continued to be on BiPAP, she was stepped up for dialysis, BP monitoring, possible vasopressor requirement and further respiratory monitoring.     During step up she was persistently hypoglycemic and hypotensive, peripheral vascular access was lost and a left TLC was placed at bedside. It was determined she could not tolerated her normal HD and she had tenuous respiratory status, so decision was made to intubate. She has been started on broad spectrum abx, and right HD IJ catheter was placed. Nephro been made aware and SCUF planned for today.     Hospital/ICU Course:  08/04 Stepped up to ICU, intubated, started on broad spectrum abx, HD catheter placed, plan is for SCUF today.   08/05 SCUF started in the AM, levo requirement of 0.06. Still intubated. Plan to transition to SLED. Blood cultures negative to date, covered with Vanc and Zosyn   08/06 On SLED today, had increase of pressor requirement was at 0.13 levo and .04 vaso, increase white count 13.3  and temp overnight 100.4. Sputum culture grew pseudomonas, d/c vancomycin, plan is to transition to SCUF.   Patient continued on levophed and vasopressin, with decreasing levophed requirements. Continued on zosyn for pseudomonas growing in respiratory cultures. SAT attempted this morning with significant tachypnea and agitation. Propofol reinitiated at prior rate, fentanyl at 75. Patient has corneal reflexes, spontaneously opens eyes but mental status is not at baseline - EEG and CTH ordered. CTH unremarkble, EEG performed but not yet read. Mental status improving, leukocytosis worsening but no evidence of worsening shock. Sputum culture with multiple organisms but none in predominance - steno in culture, pseudomonas and staph. Patient allergic to TMP/SMX. EEG read as patient in comatose state during examination. The overall degree of slowing and disorganization along with periods of suppression is suggestive of a severe encephalopathy, nonspecific to the cause.  Recent use of intravenous infusion of   propofol may be contributing to this degree of dysfunction.       Interval History/Significant Events: please see hospital course    Review of Systems   Unable to perform ROS: Intubated     Objective:     Vital Signs (Most Recent):  Temp: 97.7 °F (36.5 °C) (08/08/19 1200)  Pulse: 69 (08/08/19 1200)  Resp: (!) 24 (08/08/19 0541)  BP: (!) 86/66 (08/08/19 1200)  SpO2: 100 % (08/08/19 1200) Vital Signs (24h Range):  Temp:  [96.8 °F (36 °C)-100.2 °F (37.9 °C)] 97.7 °F (36.5 °C)  Pulse:  [64-90] 69  Resp:  [20-27] 24  SpO2:  [84 %-100 %] 100 %  BP: ()/(50-76) 86/66   Weight: 109.8 kg (242 lb)  Body mass index is 37.9 kg/m².      Intake/Output Summary (Last 24 hours) at 8/8/2019 1207  Last data filed at 8/8/2019 1200  Gross per 24 hour   Intake 6092 ml   Output 8306 ml   Net -2214 ml       Physical Exam   Constitutional: She appears ill. She is sedated, intubated and restrained.   Morbidly obese AA female, intubated  and sedated, currently on hemodialysis    HENT:   Head: Normocephalic and atraumatic.   Eyes: Pupils are equal, round, and reactive to light. Conjunctivae and EOM are normal.   Vertical gaze preference   Neck: Normal range of motion. Neck supple.   Cardiovascular: Normal rate, regular rhythm and normal heart sounds.   Pulses:       Radial pulses are 2+ on the right side, and 2+ on the left side.   Pulmonary/Chest: Effort normal. No stridor. She is intubated. No respiratory distress. She has no wheezes. She has rales.   Abdominal: Soft. Bowel sounds are normal.   Musculoskeletal: Normal range of motion. She exhibits edema. She exhibits no tenderness or deformity.   Neurological: She is alert.   Vertical gaze preference  Able to follow some commands   Skin: Skin is warm and dry.   Psychiatric:   MAGNUS, Pt intubated/sedated       Vents:  Vent Mode: A/C (08/08/19 1122)  Ventilator Initiated: Yes (08/04/19 1139)  Set Rate: 18 bmp (08/08/19 1122)  Vt Set: 390 mL (08/08/19 1122)  PEEP/CPAP: 5 cmH20 (08/08/19 1122)  Oxygen Concentration (%): 40 (08/08/19 1200)  Peak Airway Pressure: 9.8 cmH2O (08/08/19 1122)  Plateau Pressure: 31 cmH20 (08/08/19 1122)  Total Ve: 9.64 mL (08/08/19 1122)  F/VT Ratio<105 (RSBI): (!) 55.43 (08/08/19 0541)  Lines/Drains/Airways     Central Venous Catheter Line                 Trialysis (Dialysis) Catheter 08/04/19 right internal jugular 4 days         Percutaneous Central Line Insertion/Assessment - triple lumen  08/04/19 1803 left internal jugular 3 days          Drain                 Hemodialysis AV Graft Left upper arm -- days         NG/OG Tube 08/04/19 1230 3 days          Airway                 Airway - Non-Surgical 08/04/19 1139 Endotracheal Tube 4 days          Arterial Line                 Arterial Line 3 days              Significant Labs:    CBC/Anemia Profile:  Recent Labs   Lab 08/07/19  0300 08/08/19  0300   WBC 22.10* 34.10*   HGB 8.7* 8.9*   HCT 31.3* 32.1*   * 125*   MCV 89  "89   RDW 21.5* 21.8*        Chemistries:  Recent Labs   Lab 08/07/19  0300 08/07/19  1350 08/07/19  2200 08/08/19  0300     136 136 134* 136  136   K 4.3  4.3 4.4 4.6 4.9  4.9     101 102 103 103  103   CO2 22*  22* 18* 16* 16*  16*   BUN 16  16 25* 32* 36*  36*   CREATININE 1.6*  1.6* 2.2* 2.5* 2.8*  2.8*   CALCIUM 8.8  8.8 9.3 9.2 9.3  9.3   ALBUMIN 2.4*  2.4* 2.5* 2.5* 2.6*  2.6*   PROT 6.8  --   --  7.3   BILITOT 1.3*  --   --  1.1*   ALKPHOS 124  --   --  124   ALT 16  --   --  17   AST 30  --   --  34   MG 2.5 2.5 2.4 2.5   PHOS 4.6*  4.6* 5.2* 5.5* 5.5*  5.5*         Significant Imaging:  I have reviewed all pertinent imaging results/findings within the past 24 hours.      ABG  Recent Labs   Lab 08/08/19  0426   PH 7.296*   PO2 101*   PCO2 31.4*   HCO3 15.3*   BE -11     Assessment/Plan:     Neuro  Encephalopathy  Mental status questionable despite weaning sedations  - change sedation to propofol and precedex  - SAT 8/8 AM  - CT H negative  -  EEG read as "an abnormal EEG during comatose state.  The overall degree of slowing and disorganization along with periods of suppression is suggestive of a severe encephalopathy, nonspecific to the cause.  Recent use of intravenous infusion on propofol may be contributing to this degree of dysfunction".     Epilepsy  Continue keppra - IV now       Pulmonary  * Acute on chronic respiratory failure with hypoxia and hypercapnia  Patient presents with A fib in the ED. Patient is on chronic warfarin. S/P ablation multiple times.  -coarse breath sounds L>R, remains on low oxygen and PEEP but failing SAT, CXR improving  - net negative 2L off in last 24 hour  - continue on zosyn for pseudomonas in sputum culture   - continue CRRT for volume removal   - retry SAT/SBT    Cardiac/Vascular  Permanent atrial fibrillation    Patient presents with A fib in the ED. Patient is on chronic warfarin. S/P ablation multiple times  Plan  -Continue heparin " gtt  -Pacemaker in place       Acute on chronic systolic congestive heart failure  Patient has hx of HF (EF 35%). CXR in the ED suggestive of HF. BNP 1424 and troponins 0.156.  Patient is unsure if she takes her lisinopril at home.  - repeat TTE EF 30%, LVH, G3DD, LAE/JAGUAR, moderate RV systolic dysfunction   - continue CRRT for volume removal   - decreasing pressor requirements     Hypotension  On 10 mg midodrine TID at home  -Continue midodrine  - levophed/vaso - titrate to map 65      Renal/  ESRD on hemodialysis  - continue CRRT for volume removal, CXR improving, vasopressor requirements improving  - appreciate nephro assistance      Immunology/Multi System  Pulmonary sarcoidosis  Stress dose steroids while in septic shock     Hematology  Current use of long term anticoagulation  - heparin gtt while IP  - no evidence of hepatic dysfunction on labs - minor bili bump      Other  Class 2 severe obesity due to excess calories with serious comorbidity and body mass index (BMI) of 38.0 to 38.9 in adult  Will need to revisit weight loss when improving  Significant pannus    Current chronic use of systemic steroids  - no evidence of PJP ppx on MAR  - on stress dose during septic shock   - will need PJP PPX if remaining on prolonged steroids for sarcoid        Critical Care Daily Checklist:    A: Awake: RASS Goal/Actual Goal: RASS Goal: -2-->light sedation  Actual: Horton Agitation Sedation Scale (RASS): Light sedation   B: Spontaneous Breathing Trial Performed?     C: SAT & SBT Coordinated?  SAT yes, SBT no                   D: Delirium: CAM-ICU Overall CAM-ICU: Negative   E: Early Mobility Performed? YES   F: Feeding Goal: Goals: Patient to meet > 85% EEN and EPN by RD follow-up  Status: Nutrition Goal Status: new   Current Diet Order   No orders of the defined types were placed in this encounter.      AS: Analgesia/Sedation Propofol, precedex   T: Thromboembolic Prophylaxis Heparin gtt   H: HOB > 300 YES   U: Stress  Ulcer Prophylaxis (if needed) PPI   G: Glucose Control    B: Bowel Function     I: Indwelling Catheter (Lines & Yepez) Necessity CVC, PIV, ETT   D: De-escalation of Antimicrobials/Pharmacotherapies On zosyn, has multiple organisms in the sputum culture including pseudomonas/steno     Plan for the day/ETD SBT     Code Status:  Family/Goals of Care: Full Code         Critical secondary to Patient has a condition that poses threat to life and bodily function: Severe Respiratory Distress, septic shock       Critical care was time spent personally by me on the following activities: development of treatment plan with patient or surrogate and bedside caregivers, discussions with consultants, evaluation of patient's response to treatment, examination of patient, ordering and performing treatments and interventions, ordering and review of laboratory studies, ordering and review of radiographic studies, pulse oximetry, re-evaluation of patient's condition. This critical care time did not overlap with that of any other provider or involve time for any procedures.     Jeancarlos Gomez MD  Critical Care Medicine  [facility]

## 2019-08-08 NOTE — ASSESSMENT & PLAN NOTE
Outpatient HD unit: [facility]   -Nephrologist: Patient does not know  -HD tx days: TThS  -HD tx time: 4hrs 45mins    -HD access: LUE AVG  -HD modality: iHD  -Residual urine: Minimal  -EDW: 109 kg     Plan:  - Switch to SLED x12hrs to help correct acidosis    - Hourly -400cc  - Pt on heparin infusion, no need for citrate   - Strict I/Os and chart  - Will follow closely

## 2019-08-08 NOTE — SUBJECTIVE & OBJECTIVE
Interval History: Pt seen on SCUF this morning and is net negative about 2L for the past 24hrs. Pt remains sedated and ventilated, on Fi02 of 40%. Still requiring levo and vaso to maintain adequate BP.     Review of patient's allergies indicates:   Allergen Reactions    Bactrim [sulfamethoxazole-trimethoprim] Other (See Comments)     Causes renal failure    Nsaids (non-steroidal anti-inflammatory drug) Other (See Comments)     Renal failure     Current Facility-Administered Medications   Medication Frequency    0.9%  NaCl infusion (CRRT USE ONLY) Continuous    acetaminophen tablet 650 mg Q4H PRN    albuterol-ipratropium 2.5 mg-0.5 mg/3 mL nebulizer solution 3 mL Q6H PRN    chlorhexidine 0.12 % solution 15 mL BID    cinacalcet tablet 30 mg Daily with breakfast    clopidogrel tablet 75 mg Daily    dexmedetomidine (PRECEDEX) 400mcg/100mL 0.9% NaCL infusion Continuous    dextrose 10% (D10W) Bolus PRN    dextrose 10% (D10W) Bolus PRN    dextrose 50% injection 25 g PRN    fentaNYL 2500 mcg in 0.9% sodium chloride 250 mL infusion premix (titrating) Continuous    fentaNYL injection 50 mcg Q1H PRN    fludrocortisone tablet 100 mcg Daily    glucagon (human recombinant) injection 1 mg PRN    glucose chewable tablet 16 g PRN    glucose chewable tablet 24 g PRN    heparin 25,000 units in dextrose 5% (100 units/ml) IV bolus from bag - ADDITIONAL PRN BOLUS - 30 units/kg PRN    heparin 25,000 units in dextrose 5% (100 units/ml) IV bolus from bag - ADDITIONAL PRN BOLUS - 60 units/kg PRN    heparin 25,000 units in dextrose 5% 250 mL (100 units/mL) infusion LOW INTENSITY nomogram - [facility] Continuous    hydrocortisone sodium succinate injection 100 mg Q8H    insulin aspart U-100 pen 0-5 Units QID (AC + HS) PRN    levETIRAcetam in NaCl (iso-os) IVPB 500 mg Q12H    LORazepam tablet 0.5 mg Q8H PRN    magnesium sulfate 2g in water 50mL IVPB (premix) PRN    miconazole NITRATE 2 % top powder BID    midodrine tablet  10 mg TID    norepinephrine 16 mg in dextrose 5 % 250 mL infusion Continuous    ondansetron disintegrating tablet 8 mg Q8H PRN    pantoprazole suspension 40 mg Daily    piperacillin-tazobactam 4.5 g in sodium chloride 0.9% 100 mL IVPB (ready to mix system) Q12H    prednisoLONE acetate 1 % ophthalmic suspension 1 drop TID    propofol (DIPRIVAN) 10 mg/mL infusion Continuous    senna-docusate 8.6-50 mg per tablet 1 tablet BID    sevelamer carbonate tablet 800 mg Daily with dinner    sodium chloride 0.9% flush 10 mL PRN    sodium phosphate 20.01 mmol in dextrose 5 % 250 mL IVPB PRN    sodium phosphate 30 mmol in dextrose 5 % 250 mL IVPB PRN    sodium phosphate 39.99 mmol in dextrose 5 % 250 mL IVPB PRN    tiZANidine tablet 4 mg Nightly PRN    vasopressin (PITRESSIN) 0.2 Units/mL in dextrose 5 % 100 mL infusion Continuous     Facility-Administered Medications Ordered in Other Encounters   Medication Frequency    ceFAZolin injection 2 g On Call Procedure    lidocaine (PF) 10 mg/ml (1%) injection 10 mg Once    mupirocin 2 % ointment On Call Procedure    sodium chloride 0.9% flush 10 mL PRN       Objective:     Vital Signs (Most Recent):  Temp: 98.8 °F (37.1 °C) (08/08/19 0930)  Pulse: 72 (08/08/19 0930)  Resp: (!) 24 (08/08/19 0541)  BP: 94/65 (08/08/19 0930)  SpO2: 100 % (08/08/19 0930)  O2 Device (Oxygen Therapy): ventilator (08/08/19 0900) Vital Signs (24h Range):  Temp:  [96.8 °F (36 °C)-100.2 °F (37.9 °C)] 98.8 °F (37.1 °C)  Pulse:  [64-90] 72  Resp:  [20-27] 24  SpO2:  [84 %-100 %] 100 %  BP: ()/(50-76) 94/65     Weight: 109.8 kg (242 lb) (08/06/19 1600)  Body mass index is 37.9 kg/m².  Body surface area is 2.28 meters squared.    I/O last 3 completed shifts:  In: 9242 [I.V.:8542; NG/GT:700]  Out: 91507 [Other:51167]    Physical Exam   Constitutional: She is oriented to person, place, and time. She appears well-developed and well-nourished. No distress.   HENT:   Head: Normocephalic and  atraumatic.   Eyes: Pupils are equal, round, and reactive to light. Conjunctivae and EOM are normal.   Neck: Normal range of motion. Neck supple.   Cardiovascular: Normal rate, regular rhythm and normal heart sounds.   Pulmonary/Chest: Effort normal. No stridor. No respiratory distress. She has no wheezes. She has rales.   Abdominal: Soft. Bowel sounds are normal.   Musculoskeletal: Normal range of motion. She exhibits edema. She exhibits no tenderness or deformity.   Neurological: She is alert and oriented to person, place, and time.   Skin: Skin is warm and dry. She is not diaphoretic.   Psychiatric:   MAGNUS, Pt intubated/sedated       Significant Labs:  ABGs:   Recent Labs   Lab 08/08/19  0426   PH 7.296*   PCO2 31.4*   HCO3 15.3*   POCSATURATED 97   BE -11     CBC:   Recent Labs   Lab 08/08/19  0300   WBC 34.10*   RBC 3.59*   HGB 8.9*   HCT 32.1*   *   MCV 89   MCH 24.8*   MCHC 27.7*     CMP:   Recent Labs   Lab 08/08/19  0300   *  216*   CALCIUM 9.3  9.3   ALBUMIN 2.6*  2.6*   PROT 7.3     136   K 4.9  4.9   CO2 16*  16*     103   BUN 36*  36*   CREATININE 2.8*  2.8*   ALKPHOS 124   ALT 17   AST 34   BILITOT 1.1*     All labs within the past 24 hours have been reviewed.

## 2019-08-08 NOTE — SUBJECTIVE & OBJECTIVE
Interval History/Significant Events: please see hospital course    Review of Systems   Unable to perform ROS: Intubated     Objective:     Vital Signs (Most Recent):  Temp: 97.7 °F (36.5 °C) (08/08/19 1200)  Pulse: 69 (08/08/19 1200)  Resp: (!) 24 (08/08/19 0541)  BP: (!) 86/66 (08/08/19 1200)  SpO2: 100 % (08/08/19 1200) Vital Signs (24h Range):  Temp:  [96.8 °F (36 °C)-100.2 °F (37.9 °C)] 97.7 °F (36.5 °C)  Pulse:  [64-90] 69  Resp:  [20-27] 24  SpO2:  [84 %-100 %] 100 %  BP: ()/(50-76) 86/66   Weight: 109.8 kg (242 lb)  Body mass index is 37.9 kg/m².      Intake/Output Summary (Last 24 hours) at 8/8/2019 1207  Last data filed at 8/8/2019 1200  Gross per 24 hour   Intake 6092 ml   Output 8306 ml   Net -2214 ml       Physical Exam   Constitutional: She appears ill. She is sedated, intubated and restrained.   Morbidly obese AA female, intubated and sedated, currently on hemodialysis    HENT:   Head: Normocephalic and atraumatic.   Eyes: Pupils are equal, round, and reactive to light. Conjunctivae and EOM are normal.   Vertical gaze preference   Neck: Normal range of motion. Neck supple.   Cardiovascular: Normal rate, regular rhythm and normal heart sounds.   Pulses:       Radial pulses are 2+ on the right side, and 2+ on the left side.   Pulmonary/Chest: Effort normal. No stridor. She is intubated. No respiratory distress. She has no wheezes. She has rales.   Abdominal: Soft. Bowel sounds are normal.   Musculoskeletal: Normal range of motion. She exhibits edema. She exhibits no tenderness or deformity.   Neurological: She is alert.   Vertical gaze preference  Able to follow some commands   Skin: Skin is warm and dry.   Psychiatric:   MAGNUS, Pt intubated/sedated       Vents:  Vent Mode: A/C (08/08/19 1122)  Ventilator Initiated: Yes (08/04/19 1139)  Set Rate: 18 bmp (08/08/19 1122)  Vt Set: 390 mL (08/08/19 1122)  PEEP/CPAP: 5 cmH20 (08/08/19 1122)  Oxygen Concentration (%): 40 (08/08/19 1200)  Peak Airway  Pressure: 9.8 cmH2O (08/08/19 1122)  Plateau Pressure: 31 cmH20 (08/08/19 1122)  Total Ve: 9.64 mL (08/08/19 1122)  F/VT Ratio<105 (RSBI): (!) 55.43 (08/08/19 0541)  Lines/Drains/Airways     Central Venous Catheter Line                 Trialysis (Dialysis) Catheter 08/04/19 right internal jugular 4 days         Percutaneous Central Line Insertion/Assessment - triple lumen  08/04/19 1803 left internal jugular 3 days          Drain                 Hemodialysis AV Graft Left upper arm -- days         NG/OG Tube 08/04/19 1230 3 days          Airway                 Airway - Non-Surgical 08/04/19 1139 Endotracheal Tube 4 days          Arterial Line                 Arterial Line 3 days              Significant Labs:    CBC/Anemia Profile:  Recent Labs   Lab 08/07/19  0300 08/08/19  0300   WBC 22.10* 34.10*   HGB 8.7* 8.9*   HCT 31.3* 32.1*   * 125*   MCV 89 89   RDW 21.5* 21.8*        Chemistries:  Recent Labs   Lab 08/07/19  0300 08/07/19  1350 08/07/19  2200 08/08/19  0300     136 136 134* 136  136   K 4.3  4.3 4.4 4.6 4.9  4.9     101 102 103 103  103   CO2 22*  22* 18* 16* 16*  16*   BUN 16  16 25* 32* 36*  36*   CREATININE 1.6*  1.6* 2.2* 2.5* 2.8*  2.8*   CALCIUM 8.8  8.8 9.3 9.2 9.3  9.3   ALBUMIN 2.4*  2.4* 2.5* 2.5* 2.6*  2.6*   PROT 6.8  --   --  7.3   BILITOT 1.3*  --   --  1.1*   ALKPHOS 124  --   --  124   ALT 16  --   --  17   AST 30  --   --  34   MG 2.5 2.5 2.4 2.5   PHOS 4.6*  4.6* 5.2* 5.5* 5.5*  5.5*         Significant Imaging:  I have reviewed all pertinent imaging results/findings within the past 24 hours.

## 2019-08-08 NOTE — PROCEDURES
DATE OF STUDY:  08/07/2019    EEG NUMBER:  FH-.    REFERRING PHYSICIAN:  Dr. Hoffman.    This EEG was performed to assess for subclinical seizures.    ELECTROENCEPHALOGRAM REPORT    METHODOLOGY:  Electroencephalographic (EEG) recording is recorded with   electrodes placed according to the International 10-20 placement system.  Thirty   two (32) channels of digital signal (sampling rate of 512/sec), including T1   and T2, were simultaneously recorded from the scalp and may include EKG, EMG,   and/or eye monitors.  Recording band pass was 0.1 to 512 Hz.  Digital video   recording of the patient is simultaneously recorded with the EEG.  The patient   is instructed to report clinical symptoms which may occur during the recording   session.  EEG and video recording are stored and archived in digital format.    Activation procedures, which include photic stimulation, hyperventilation and   instructing patients to perform simple tasks, are done in selected patients.    The EEG is displayed on a monitor screen and can be reviewed using different   montages.  Computer assisted-analysis is employed to detect spike and   electrographic seizure activity.  The entire record is submitted for computer   analysis.  The entire recording is visually reviewed, and the times identified   by computer analysis as being spikes or seizures are reviewed again.    Compressed spectral analysis (CSA) is also performed on the activity recorded   from each individual channel.  This is displayed as a power display of   frequencies from 0 to 30 Hz over time.  The CSA is reviewed looking for   asymmetries in power between homologous areas of the scalp, then compared with   the original EEG recording.    [employer] software was also utilized in the review of this study.  This software   suite analyzes the EEG recording in multiple domains.  Coherence and rhythmicity   are computed to identify EEG sections which may contain organized seizures.     Each channel undergoes analysis to detect the presence of spike and sharp waves   which have special and morphological characteristics of epileptic activity.  The   routine EEG recording is converted from special into frequency domain.  This is   then displayed comparing homologous areas to identify areas of significant   asymmetry.  Algorithm to identify non-cortically generated artifact is used to   separate artifact from the EEG.    EEG FINDINGS:  The recording was obtained with a number of standard bipolar and   referential montages during comatose state.  In this state, the background was   diffusely disorganized and comprised an admixture of delta and theta range   frequencies intermixed with occasional symmetric spindles.  The background was   discontinuous with periods of suppression lasting 1 to 2 seconds.  No clear   state changes were appreciated.  There were no interictal epileptiform   abnormalities and no clinical or electrographic seizures were recorded.    Activation procedures were not performed.  Spontaneous variability was noted.    Reactivity was absent.    The EKG channel revealed sinus rhythm.    IMPRESSION:  This is an abnormal EEG during comatose state.  Diffuse slowing of   the background along with periods of suppression were noted.  Variability was   present.  Reactivity was absent.    CLINICAL CORRELATION:  The patient is a 58-year-old female who presented with   shortness of breath and hypotension who was recently maintained on intravenous   infusion of propofol.  The patient is currently maintained on levetiracetam.    This is an abnormal EEG during comatose state.  The overall degree of slowing   and disorganization along with periods of suppression is suggestive of a severe   encephalopathy, nonspecific to the cause.  Recent use of intravenous infusion of   propofol may be contributing to this degree of dysfunction.  There is no   evidence of an epileptic process on this recording.   No seizures were recorded   during this study.      FAK/IN  dd: 08/08/2019 10:08:31 (CDT)  td: 08/08/2019 10:31:01 (CDT)  Doc ID   #[id number]  Job ID #[id number]    CC:

## 2019-08-08 NOTE — PROGRESS NOTES
[facility]  Nephrology  Progress Note    Patient Name: Sisi Medel  MRN: [id number]  Admission Date: 8/3/2019  Hospital Length of Stay: 5 days  Attending Provider: Bang Cordero MD   Primary Care Physician: Carlos A Caputo MD  Principal Problem:Acute on chronic respiratory failure with hypoxia and hypercapnia      Interval History: Pt seen on SCUF this morning and is net negative about 2L for the past 24hrs. Pt remains sedated and ventilated, on Fi02 of 40%. Still requiring levo and vaso to maintain adequate BP.     Review of patient's allergies indicates:   Allergen Reactions    Bactrim [sulfamethoxazole-trimethoprim] Other (See Comments)     Causes renal failure    Nsaids (non-steroidal anti-inflammatory drug) Other (See Comments)     Renal failure     Current Facility-Administered Medications   Medication Frequency    0.9%  NaCl infusion (CRRT USE ONLY) Continuous    acetaminophen tablet 650 mg Q4H PRN    albuterol-ipratropium 2.5 mg-0.5 mg/3 mL nebulizer solution 3 mL Q6H PRN    chlorhexidine 0.12 % solution 15 mL BID    cinacalcet tablet 30 mg Daily with breakfast    clopidogrel tablet 75 mg Daily    dexmedetomidine (PRECEDEX) 400mcg/100mL 0.9% NaCL infusion Continuous    dextrose 10% (D10W) Bolus PRN    dextrose 10% (D10W) Bolus PRN    dextrose 50% injection 25 g PRN    fentaNYL 2500 mcg in 0.9% sodium chloride 250 mL infusion premix (titrating) Continuous    fentaNYL injection 50 mcg Q1H PRN    fludrocortisone tablet 100 mcg Daily    glucagon (human recombinant) injection 1 mg PRN    glucose chewable tablet 16 g PRN    glucose chewable tablet 24 g PRN    heparin 25,000 units in dextrose 5% (100 units/ml) IV bolus from bag - ADDITIONAL PRN BOLUS - 30 units/kg PRN    heparin 25,000 units in dextrose 5% (100 units/ml) IV bolus from bag - ADDITIONAL PRN BOLUS - 60 units/kg PRN    heparin 25,000 units in dextrose 5% 250 mL (100 units/mL) infusion LOW  INTENSITY nomogram - [facility] Continuous    hydrocortisone sodium succinate injection 100 mg Q8H    insulin aspart U-100 pen 0-5 Units QID (AC + HS) PRN    levETIRAcetam in NaCl (iso-os) IVPB 500 mg Q12H    LORazepam tablet 0.5 mg Q8H PRN    magnesium sulfate 2g in water 50mL IVPB (premix) PRN    miconazole NITRATE 2 % top powder BID    midodrine tablet 10 mg TID    norepinephrine 16 mg in dextrose 5 % 250 mL infusion Continuous    ondansetron disintegrating tablet 8 mg Q8H PRN    pantoprazole suspension 40 mg Daily    piperacillin-tazobactam 4.5 g in sodium chloride 0.9% 100 mL IVPB (ready to mix system) Q12H    prednisoLONE acetate 1 % ophthalmic suspension 1 drop TID    propofol (DIPRIVAN) 10 mg/mL infusion Continuous    senna-docusate 8.6-50 mg per tablet 1 tablet BID    sevelamer carbonate tablet 800 mg Daily with dinner    sodium chloride 0.9% flush 10 mL PRN    sodium phosphate 20.01 mmol in dextrose 5 % 250 mL IVPB PRN    sodium phosphate 30 mmol in dextrose 5 % 250 mL IVPB PRN    sodium phosphate 39.99 mmol in dextrose 5 % 250 mL IVPB PRN    tiZANidine tablet 4 mg Nightly PRN    vasopressin (PITRESSIN) 0.2 Units/mL in dextrose 5 % 100 mL infusion Continuous     Facility-Administered Medications Ordered in Other Encounters   Medication Frequency    ceFAZolin injection 2 g On Call Procedure    lidocaine (PF) 10 mg/ml (1%) injection 10 mg Once    mupirocin 2 % ointment On Call Procedure    sodium chloride 0.9% flush 10 mL PRN       Objective:     Vital Signs (Most Recent):  Temp: 98.8 °F (37.1 °C) (08/08/19 0930)  Pulse: 72 (08/08/19 0930)  Resp: (!) 24 (08/08/19 0541)  BP: 94/65 (08/08/19 0930)  SpO2: 100 % (08/08/19 0930)  O2 Device (Oxygen Therapy): ventilator (08/08/19 0900) Vital Signs (24h Range):  Temp:  [96.8 °F (36 °C)-100.2 °F (37.9 °C)] 98.8 °F (37.1 °C)  Pulse:  [64-90] 72  Resp:  [20-27] 24  SpO2:  [84 %-100 %] 100 %  BP: ()/(50-76) 94/65     Weight: 109.8 kg (242 lb)  (08/06/19 1600)  Body mass index is 37.9 kg/m².  Body surface area is 2.28 meters squared.    I/O last 3 completed shifts:  In: 9242 [I.V.:8542; NG/GT:700]  Out: 40351 [Other:24825]    Physical Exam   Constitutional: She is oriented to person, place, and time. She appears well-developed and well-nourished. No distress.   HENT:   Head: Normocephalic and atraumatic.   Eyes: Pupils are equal, round, and reactive to light. Conjunctivae and EOM are normal.   Neck: Normal range of motion. Neck supple.   Cardiovascular: Normal rate, regular rhythm and normal heart sounds.   Pulmonary/Chest: Effort normal. No stridor. No respiratory distress. She has no wheezes. She has rales.   Abdominal: Soft. Bowel sounds are normal.   Musculoskeletal: Normal range of motion. She exhibits edema. She exhibits no tenderness or deformity.   Neurological: She is alert and oriented to person, place, and time.   Skin: Skin is warm and dry. She is not diaphoretic.   Psychiatric:   MAGNUS, Pt intubated/sedated       Significant Labs:  ABGs:   Recent Labs   Lab 08/08/19  0426   PH 7.296*   PCO2 31.4*   HCO3 15.3*   POCSATURATED 97   BE -11     CBC:   Recent Labs   Lab 08/08/19  0300   WBC 34.10*   RBC 3.59*   HGB 8.9*   HCT 32.1*   *   MCV 89   MCH 24.8*   MCHC 27.7*     CMP:   Recent Labs   Lab 08/08/19  0300   *  216*   CALCIUM 9.3  9.3   ALBUMIN 2.6*  2.6*   PROT 7.3     136   K 4.9  4.9   CO2 16*  16*     103   BUN 36*  36*   CREATININE 2.8*  2.8*   ALKPHOS 124   ALT 17   AST 34   BILITOT 1.1*     All labs within the past 24 hours have been reviewed.         Assessment/Plan:     ESRD on hemodialysis  Outpatient HD unit: [facility]   -Nephrologist: Patient does not know  -HD tx days: TThS  -HD tx time: 4hrs 45mins    -HD access: LUE AVG  -HD modality: iHD  -Residual urine: Minimal  -EDW: 109 kg     Plan:  - Switch to SLED x12hrs to help correct acidosis    - Hourly -400cc  - Pt on heparin infusion,  no need for citrate   - Strict I/Os and chart  - Will follow closely        Thank you for your consult. I will follow-up with patient. Please contact us if you have any additional questions.    Dinh Rodriguez NP  Nephrology  [facility]

## 2019-08-09 NOTE — PROGRESS NOTES
[facility]  Critical Care Medicine  Progress Note    Patient Name: Sisi Medel  MRN: [id number]  Admission Date: 8/3/2019  Hospital Length of Stay: 6 days  Code Status: Full Code  Attending Provider: Bang Cordero MD  Primary Care Provider: Carlos A Caputo MD   Principal Problem: Acute on chronic respiratory failure with hypoxia and hypercapnia    Subjective:     HPI:  Ms Medel  57 yo f with ESRD on HD, cervical radiculopathy, CHF, Chronic resp failure with hypoxia, T2DM, DDD, paroxysmal Afib, seizure, shingles, thyroid disorder here for a 3 day history of cough and SOB. Patient was at dialysis and had to stop at 1.6L due to resp distress. She was put in a nonrebreather at 12L of oxygen. She was transported via EMS. Overnight she required BiPAP for worsening SOB. Rapid response was called this morning because she was hypotensive 70s/50s. Dr. Rosas, MICU team, went and recheck BP was 107/59. She later became hypertensive again and continued to be on BiPAP, she was stepped up for dialysis, BP monitoring, possible vasopressor requirement and further respiratory monitoring.     During step up she was persistently hypoglycemic and hypotensive, peripheral vascular access was lost and a left TLC was placed at bedside. It was determined she could not tolerated her normal HD and she had tenuous respiratory status, so decision was made to intubate. She has been started on broad spectrum abx, and right HD IJ catheter was placed. Nephro been made aware and SCUF planned for today.     Hospital/ICU Course:  08/04 Stepped up to ICU, intubated, started on broad spectrum abx, HD catheter placed, plan is for SCUF today.   08/05 SCUF started in the AM, levo requirement of 0.06. Still intubated. Plan to transition to SLED. Blood cultures negative to date, covered with Vanc and Zosyn   08/06 On SLED today, had increase of pressor requirement was at 0.13 levo and .04 vaso, increase white count 13.3  and temp overnight 100.4. Sputum culture grew pseudomonas, d/c vancomycin, plan is to transition to SCUF.   08/07 Patient continued on levophed and vasopressin, with decreasing levophed requirements. Continued on zosyn for pseudomonas growing in respiratory cultures. SAT attempted this morning with significant tachypnea and agitation. Propofol reinitiated at prior rate, fentanyl at 75. Patient has corneal reflexes, spontaneously opens eyes but mental status is not at baseline - EEG and CTH ordered. CTH unremarkble, EEG performed but not yet read. Mental status improving, leukocytosis worsening but no evidence of infection. Likely related to stress dose steroids.   08/09 EEG result c/w encephalopathy; passed SAT; SBT: RSBI 88, however hypotensive with SBT and tachypneic to 35           Interval History/Significant Events: Passed SAT, but failed SBT d/t hypotension and tachypnea. These findings in conjunction with immunosuppressed status and worsening leukocytosis raise concern for occult respiratory infection. Will plan for bronchoscopy later today. ID consulted.     Review of Systems   Unable to perform ROS: Intubated     Objective:     Vital Signs (Most Recent):  Temp: 99 °F (37.2 °C) (08/09/19 1345)  Pulse: 70 (08/09/19 1345)  Resp: (!) 24 (08/08/19 0541)  BP: (!) 97/55 (08/09/19 1345)  SpO2: 100 % (08/09/19 1345) Vital Signs (24h Range):  Temp:  [98.4 °F (36.9 °C)-100.2 °F (37.9 °C)] 99 °F (37.2 °C)  Pulse:  [65-76] 70  SpO2:  [60 %-100 %] 100 %  BP: ()/(52-84) 97/55   Weight: 109.8 kg (242 lb)  Body mass index is 37.9 kg/m².      Intake/Output Summary (Last 24 hours) at 8/9/2019 1407  Last data filed at 8/9/2019 1300  Gross per 24 hour   Intake 6331 ml   Output 8090 ml   Net -1759 ml       Physical Exam   Constitutional: She appears ill. She is sedated, intubated and restrained.   Morbidly obese AA female, intubated and sedated, currently on hemodialysis    HENT:   Head: Normocephalic and atraumatic.    Eyes: Pupils are equal, round, and reactive to light. Conjunctivae and EOM are normal.   Vertical gaze preference   Neck: Normal range of motion. Neck supple.   Cardiovascular: Normal rate, regular rhythm and normal heart sounds.   Pulses:       Radial pulses are 2+ on the right side, and 2+ on the left side.   Pulmonary/Chest: Effort normal. No stridor. She is intubated. No respiratory distress. She has no wheezes. She has rales.   Abdominal: Soft. Bowel sounds are normal.   Musculoskeletal: Normal range of motion. She exhibits edema. She exhibits no tenderness or deformity.   Neurological: She is alert.   Vertical gaze preference  Able to follow some commands   Skin: Skin is warm and dry.   Psychiatric:   MAGNUS, Pt intubated/sedated       Vents:  Vent Mode: A/C (08/09/19 1140)  Ventilator Initiated: Yes (08/04/19 1139)  Set Rate: 14 bmp (08/09/19 1140)  Vt Set: 400 mL (08/09/19 1140)  Pressure Support: (S) 12 cmH20 (08/09/19 0804)  PEEP/CPAP: 8 cmH20 (08/09/19 1140)  Oxygen Concentration (%): 30 (08/09/19 1345)  Peak Airway Pressure: 31 cmH2O (08/09/19 1140)  Plateau Pressure: 27 cmH20 (08/09/19 1140)  Total Ve: 11.5 mL (08/09/19 1140)  F/VT Ratio<105 (RSBI): (!) 55.43 (08/08/19 0541)  Lines/Drains/Airways     Central Venous Catheter Line                 Trialysis (Dialysis) Catheter 08/04/19 right internal jugular 5 days         Percutaneous Central Line Insertion/Assessment - triple lumen  08/04/19 1803 left internal jugular 4 days          Drain                 Hemodialysis AV Graft Left upper arm -- days         NG/OG Tube 08/04/19 1230 5 days          Airway                 Airway - Non-Surgical 08/04/19 1139 Endotracheal Tube 5 days          Arterial Line                 Arterial Line 4 days              Significant Labs:    CBC/Anemia Profile:  Recent Labs   Lab 08/08/19  0300 08/09/19  0300   WBC 34.10* 46.97*   HGB 8.9* 9.1*   HCT 32.1* 31.5*   * 115*   MCV 89 87   RDW 21.8* 21.6*         Chemistries:  Recent Labs   Lab 08/08/19  0300  08/08/19  2200 08/09/19  0300 08/09/19  1325     136   < > 138 135*  135* 135*   K 4.9  4.9   < > 4.8 4.7  4.7 4.9     103   < > 101 100  100 104   CO2 16*  16*   < > 20* 18*  18* 18*   BUN 36*  36*   < > 12 15  15 10   CREATININE 2.8*  2.8*   < > 0.9 1.0  1.0 0.8   CALCIUM 9.3  9.3   < > 9.0 8.9  8.9 8.6*   ALBUMIN 2.6*  2.6*   < > 2.8* 2.7*  2.7* 2.8*   PROT 7.3  --   --  7.4  --    BILITOT 1.1*  --   --  1.2*  --    ALKPHOS 124  --   --  127  --    ALT 17  --   --  14  --    AST 34  --   --  28  --    MG 2.5   < > 2.0 2.3 2.0   PHOS 5.5*  5.5*   < > 2.0* 2.6*  2.6* 2.9    < > = values in this interval not displayed.       All pertinent labs within the past 24 hours have been reviewed.    Significant Imaging:  I have reviewed all pertinent imaging results/findings within the past 24 hours.      ABG  Recent Labs   Lab 08/09/19  0927   PH 7.372   PO2 120*   PCO2 32.6*   HCO3 18.9*   BE -6     Assessment/Plan:     Neuro  Encephalopathy  Mental status questionable despite weaning sedations  - change sedation to propofol and precedex  - SAT 8/8 AM  - CT H and EEG unremarkable    Epilepsy  Continue keppra  - follow EEG    Pulmonary  * Acute on chronic respiratory failure with hypoxia and hypercapnia  Patient presents with A fib in the ED. Patient is on chronic warfarin. S/P ablation multiple times.  - coarse breath sounds L>R, remains on low oxygen and PEEP but failing SAT, CXR improving  - net negative 2L off in last 24 hour  - continue on zosyn for pseudomonas in sputum culture   - Consult ID for antibiotic assistance  - continue CRRT for volume removal     Pulmonary hypertension  Likely Group 2 pHTN related to HFrEF    TTE (8/6/2019): PASP of 50        Cardiac/Vascular  Permanent atrial fibrillation    Patient presents with A fib in the ED. Patient is on chronic warfarin. S/P ablation multiple times  Plan  -Continue heparin  gtt  -Pacemaker in place       Acute on chronic systolic congestive heart failure  Patient has hx of HF (EF 35%). CXR in the ED suggestive of HF. BNP 1424 and troponins 0.156.  Patient is unsure if she takes her lisinopril at home.  - repeat TTE EF 30%, LVH, G3DD, LAE/JAGUAR, moderate RV systolic dysfunction   - continue CRRT for volume removal   - decreasing pressor requirements     Hypotension  On 10 mg midodrine TID at home  -Continue midodrine  - levophed/vaso - titrate to map 65      Renal/  ESRD on hemodialysis  - continue CRRT for volume removal, CXR improving, vasopressor requirements improving  - appreciate nephro assistance      Immunology/Multi System  Pulmonary sarcoidosis  Stress dose steroids while in septic shock     Hematology  Current use of long term anticoagulation  - heparin gtt while IP  - no evidence of hepatic dysfunction on labs - minor bili bump      Other  Class 2 severe obesity due to excess calories with serious comorbidity and body mass index (BMI) of 38.0 to 38.9 in adult  Will need to revisit weight loss when improving  Significant pannus    Current chronic use of systemic steroids  - no evidence of PJP ppx on MAR  - on stress dose during septic shock   - will need PJP PPX if remaining on prolonged steroids for sarcoid        Critical Care Daily Checklist:    A: Awake: RASS Goal/Actual Goal: RASS Goal: -2-->light sedation  Actual: Horton Agitation Sedation Scale (RASS): Drowsy   B: Spontaneous Breathing Trial Performed? Spon. Breathing Trial Initiated?: Initiated (08/09/19 0804)   C: SAT & SBT Coordinated?  SAT passed // SBT failed // will extubate to BiPAP                      D: Delirium: CAM-ICU Overall CAM-ICU: Negative   E: Early Mobility Performed? Yes   F: Feeding Goal: Goals: Patient to meet > 85% EEN and EPN by RD follow-up  Status: Nutrition Goal Status: goal not met   Current Diet Order   Procedures    Diet NPO Except for: Medication     Order Specific Question:   Except  for     Answer:   Medication      AS: Analgesia/Sedation Fentanyl and propofol   T: Thromboembolic Prophylaxis Heparin, gtt   H: HOB > 300 Yes   U: Stress Ulcer Prophylaxis (if needed) Pantoprazole 40 pGT qd   G: Glucose Control SSI   B: Bowel Function     I: Indwelling Catheter (Lines & Yepez) Necessity Yepez and Trialysis    D: De-escalation of Antimicrobials/Pharmacotherapies Zosyn     Plan for the day/ETD Consult ID, bronch    Code Status:  Family/Goals of Care: Full Code  n/a       Critical secondary to Patient has a condition that poses threat to life and bodily function: Severe Respiratory Distress, Acute Renal Failure and acute exacerbation of heart failure.       Critical care was time spent personally by me on the following activities: development of treatment plan with patient or surrogate and bedside caregivers, discussions with consultants, evaluation of patient's response to treatment, examination of patient, ordering and performing treatments and interventions, ordering and review of laboratory studies, ordering and review of radiographic studies, pulse oximetry, re-evaluation of patient's condition. This critical care time did not overlap with that of any other provider or involve time for any procedures.     Behram Khan, MD  Critical Care Medicine  [facility]

## 2019-08-09 NOTE — PROGRESS NOTES
"[facility]  Adult Nutrition  Progress Note    SUMMARY       Recommendations  Recommendation/Intervention:   1. When able to extubate, ADAT to Renal with texture per SLP.   2. If unable to extubate, recommend resuming TF of Peptamen Intense VHP at 55 mL/hr.   RD to monitor.    Goals: Patient to meet > 85% EEN and EPN by RD follow-up  Nutrition Goal Status: goal not met  Communication of RD Recs: (POC)    Reason for Assessment  Reason For Assessment: RD follow-up  Diagnosis: (respiratory failure)  Relevant Medical History: CHF, DM2, ESRD on HD, seizures  General Information Comments: Intubated, sedated. On CRRT. TF started 8/6, currently on hold for bronch and possible extubation. Physical exam remains unchanged, continues to appear nourished.  Nutrition Discharge Planning: Unable to determine at this time.    Nutrition Risk Screen  Nutrition Risk Screen: tube feeding or parenteral nutrition    Nutrition/Diet History  Spiritual, Cultural Beliefs, Yazidi Practices, Values that Affect Care: no  Factors Affecting Nutritional Intake: NPO, on mechanical ventilation    Anthropometrics  Temp: 99 °F (37.2 °C)  Height Method: Stated  Height: 5' 7" (170.2 cm)  Height (inches): 67 in  Weight Method: Bed Scale  Weight: 109.8 kg (242 lb)  Weight (lb): 242 lb  Ideal Body Weight (IBW), Female: 135 lb  % Ideal Body Weight, Female (lb): 179.26 lb  BMI (Calculated): 38  BMI Grade: greater than 40 - morbid obesity    Lab/Procedures/Meds  Pertinent Labs Reviewed: reviewed  Pertinent Labs Comments: Na 135, Glu 215, POCT Glu 181-226, HgbA1c 6.1, Phos 2.6, Alb 2.7  Pertinent Medications Reviewed: reviewed  Pertinent Medications Comments: pantoprazole, senna-docusate, precedex, fentanyl, levophed, vasopressin    Estimated/Assessed Needs  Weight Used For Calorie Calculations: 110 kg (242 lb 8.1 oz)  Energy Calorie Requirements (kcal): 1540 kcal/day  Energy Need Method: Kcal/kg(14)  Protein Requirements: 118 g/day(2.0 " g/kg)  Weight Used For Protein Calculations: 59.1 kg (130 lb 4.7 oz)(IBW)  Fluid Requirements (mL): 1 mL/kcal or per MD  Estimated Fluid Requirement Method: RDA Method  RDA Method (mL): 1540  CHO Requirement: 50% total kcal    Nutrition Prescription Ordered  Current Diet Order: NPO  Current Nutrition Support Formula Ordered: Peptamen Intense VHP  Current Nutrition Support Rate Ordered: 55 (ml)  Current Nutrition Support Frequency Ordered: mL/hr    Evaluation of Received Nutrient/Fluid Intake  Enteral Calories (kcal): 1320  Enteral Protein (gm): 121  Enteral (Free Water) Fluid (mL): 1109  % Kcal Needs: 86%  % Protein Needs: 103%  I/O: -2.4L x 24hrs, -6.9L since admit  Energy Calories Required: meeting needs  Protein Required: meeting needs  Fluid Required: (per MD)  Comments: LBM 8/2  Tolerance: (on hold)  % Intake of Estimated Energy Needs: 0 - 25 % (TF on hold)  % Meal Intake: NPO    Nutrition Risk  Level of Risk/Frequency of Follow-up: high(2x/week)     Assessment and Plan  Nutrition Problem  Inadequate energy intake     Related to (etiology):   Decreased ability to consume sufficient energy     Signs and Symptoms (as evidenced by):   NPO with and TF on hold     Interventions/Recommendations (treatment strategy):  Collaboration of nutrition care with other providers     Nutrition Diagnosis Status:   Continues    Monitor and Evaluation  Food and Nutrient Intake: energy intake, enteral nutrition intake  Food and Nutrient Adminstration: enteral and parenteral nutrition administration  Anthropometric Measurements: weight, weight change  Biochemical Data, Medical Tests and Procedures: electrolyte and renal panel, gastrointestinal profile, glucose/endocrine profile, inflammatory profile  Nutrition-Focused Physical Findings: overall appearance     Nutrition Follow-Up  RD Follow-up?: Yes

## 2019-08-09 NOTE — ASSESSMENT & PLAN NOTE
Patient presents with A fib in the ED. Patient is on chronic warfarin. S/P ablation multiple times.  - coarse breath sounds L>R, remains on low oxygen and PEEP but failing SAT, CXR improving  - net negative 2L off in last 24 hour  - continue on zosyn for pseudomonas in sputum culture   - Consult ID for antibiotic assistance  - continue CRRT for volume removal

## 2019-08-09 NOTE — SUBJECTIVE & OBJECTIVE
Interval History: Pt seen on SLED this morning and is net negative about 2.3L for the past 24hrs. Pt remains sedated and ventilated, on Fi02 of 40%. Still requiring levo and vaso to maintain adequate BP. She is more awake this morning. She nods/gestures appropriately and is following commands.     Review of patient's allergies indicates:   Allergen Reactions    Bactrim [sulfamethoxazole-trimethoprim] Other (See Comments)     Causes renal failure    Nsaids (non-steroidal anti-inflammatory drug) Other (See Comments)     Renal failure     Current Facility-Administered Medications   Medication Frequency    0.9%  NaCl infusion (CRRT USE ONLY) Continuous    acetaminophen tablet 650 mg Q4H PRN    albuterol-ipratropium 2.5 mg-0.5 mg/3 mL nebulizer solution 3 mL Q6H PRN    chlorhexidine 0.12 % solution 15 mL BID    cinacalcet tablet 30 mg Daily with breakfast    clopidogrel tablet 75 mg Daily    dexmedetomidine (PRECEDEX) 400mcg/100mL 0.9% NaCL infusion Continuous    dextrose 10% (D10W) Bolus PRN    dextrose 10% (D10W) Bolus PRN    dextrose 50% injection 25 g PRN    epoetin lily-epbx injection 5,500 Units Every Mon, Wed, Fri    fentaNYL 2500 mcg in 0.9% sodium chloride 250 mL infusion premix (titrating) Continuous    fentaNYL injection 50 mcg Q1H PRN    fludrocortisone tablet 100 mcg Daily    glucagon (human recombinant) injection 1 mg PRN    glucose chewable tablet 16 g PRN    glucose chewable tablet 24 g PRN    heparin 25,000 units in dextrose 5% (100 units/ml) IV bolus from bag - ADDITIONAL PRN BOLUS - 30 units/kg PRN    heparin 25,000 units in dextrose 5% (100 units/ml) IV bolus from bag - ADDITIONAL PRN BOLUS - 60 units/kg PRN    heparin 25,000 units in dextrose 5% 250 mL (100 units/mL) infusion LOW INTENSITY nomogram - [facility] Continuous    hydrocortisone sodium succinate injection 100 mg Q8H    insulin aspart U-100 pen 0-5 Units QID (AC + HS) PRN    levETIRAcetam in NaCl (iso-os) IVPB 500 mg Q12H     LORazepam tablet 0.5 mg Q8H PRN    magnesium sulfate 2g in water 50mL IVPB (premix) PRN    miconazole NITRATE 2 % top powder BID    midodrine tablet 10 mg TID    norepinephrine 16 mg in dextrose 5 % 250 mL infusion Continuous    ondansetron disintegrating tablet 8 mg Q8H PRN    pantoprazole suspension 40 mg Daily    piperacillin-tazobactam 4.5 g in sodium chloride 0.9% 100 mL IVPB (ready to mix system) Q8H    polyethylene glycol packet 17 g Daily    prednisoLONE acetate 1 % ophthalmic suspension 1 drop TID    propofol (DIPRIVAN) 10 mg/mL infusion Continuous    senna-docusate 8.6-50 mg per tablet 1 tablet BID    sevelamer carbonate tablet 800 mg Daily with dinner    sodium chloride 0.9% flush 10 mL PRN    sodium phosphate 20.01 mmol in dextrose 5 % 250 mL IVPB PRN    sodium phosphate 30 mmol in dextrose 5 % 250 mL IVPB PRN    sodium phosphate 39.99 mmol in dextrose 5 % 250 mL IVPB PRN    tiZANidine tablet 4 mg Nightly PRN    vasopressin (PITRESSIN) 0.2 Units/mL in dextrose 5 % 100 mL infusion Continuous     Facility-Administered Medications Ordered in Other Encounters   Medication Frequency    ceFAZolin injection 2 g On Call Procedure    lidocaine (PF) 10 mg/ml (1%) injection 10 mg Once    mupirocin 2 % ointment On Call Procedure    sodium chloride 0.9% flush 10 mL PRN       Objective:     Vital Signs (Most Recent):  Temp: 99 °F (37.2 °C) (08/09/19 1345)  Pulse: 70 (08/09/19 1345)  Resp: (!) 24 (08/08/19 0541)  BP: (!) 97/55 (08/09/19 1345)  SpO2: 100 % (08/09/19 1345)  O2 Device (Oxygen Therapy): ventilator (08/09/19 1300) Vital Signs (24h Range):  Temp:  [98.4 °F (36.9 °C)-100.2 °F (37.9 °C)] 99 °F (37.2 °C)  Pulse:  [65-76] 70  SpO2:  [60 %-100 %] 100 %  BP: ()/(52-84) 97/55     Weight: 109.8 kg (242 lb) (08/06/19 1600)  Body mass index is 37.9 kg/m².  Body surface area is 2.28 meters squared.    I/O last 3 completed shifts:  In: 10934 [I.V.:46016; NG/GT:880; IV Piggyback:200]  Out:  50604 [Other:97609]    Physical Exam   Constitutional: She is oriented to person, place, and time. She appears well-developed and well-nourished. No distress.   HENT:   Head: Normocephalic and atraumatic.   Eyes: Pupils are equal, round, and reactive to light. Conjunctivae and EOM are normal.   Neck: Normal range of motion. Neck supple.   Cardiovascular: Normal rate, regular rhythm and normal heart sounds.   Pulmonary/Chest: Effort normal. No stridor. No respiratory distress. She has no wheezes. She has rales.   Abdominal: Soft. Bowel sounds are normal.   Musculoskeletal: Normal range of motion. She exhibits edema. She exhibits no tenderness or deformity.   Neurological: She is alert and oriented to person, place, and time.   Skin: Skin is warm and dry. She is not diaphoretic.   Psychiatric:   MAGNUS, Pt intubated/sedated       Significant Labs:  ABGs:   Recent Labs   Lab 08/09/19  0927   PH 7.372   PCO2 32.6*   HCO3 18.9*   POCSATURATED 99   BE -6     CBC:   Recent Labs   Lab 08/09/19  0300   WBC 46.97*   RBC 3.62*   HGB 9.1*   HCT 31.5*   *   MCV 87   MCH 25.1*   MCHC 28.9*     CMP:   Recent Labs   Lab 08/09/19  0300   *  215*   CALCIUM 8.9  8.9   ALBUMIN 2.7*  2.7*   PROT 7.4   *  135*   K 4.7  4.7   CO2 18*  18*     100   BUN 15  15   CREATININE 1.0  1.0   ALKPHOS 127   ALT 14   AST 28   BILITOT 1.2*     All labs within the past 24 hours have been reviewed.

## 2019-08-09 NOTE — PLAN OF CARE
Pt remains intubated.  A/C 40%/5 peep/rate 14  Following commands, moves all ext.  PPM in place.  CVP 5, 8.  TMAX 100.  Maintaining MAP > 65 with pressors.  Levophed @ 0.1 mcg/kg/min.  Vasopressin @ 0.04 units/min.  Precedex gtt infusing.  Heparin gtt remains infusing. aPTT WNL.  TF @ 20cc.hr.   Cont CRRT in place with UF @ goal 400cc/hr.  Accu q4h.  Trending labs. Replacing lytes.  Bath given.  Family updated on POC by RN. All questions and concerns addressed.

## 2019-08-09 NOTE — PROGRESS NOTES
Critical care team A at bedside. Dr. Cordero orders to pause tube feeds, plans for bronch, SBT, and possible extubation to BiPAP this afternoon. MD updated family on plan of care. All questions answered. VSS. Will continue to monitor.

## 2019-08-09 NOTE — PROCEDURES
Bronchoscopy Procedure Note      Date of Operation: 08/09/2019    Pre-op Diagnosis: Hypoxic respiratory failure    Post-op Diagnosis: hypoxic respiratory failure    Surgeons: Georgie Kohler MD (fellow) &  Bang Cordero MD(staff)        Anesthesia:   Patient intubated- propofol and precedex    Operation: Flexible fiberoptic bronchoscopy, diagnostic     Specimen: BAL- 60 cc     Estimated Blood Loss: Minimal    Indications:   Hypoxic respiratory failure     Consent:   The risks, benefits, complications, treatment options and expected outcomes were discussed with the patient's son. The possibilities of reaction to medication, pulmonary aspiration, pneumothorax, bleeding, failure to diagnose their condition, and creating a complication requiring transfusion or operation were discussed with the patient's son. All questions were answered, and the consent was signed and placed in the chart.    Description of Procedure:  The patient was identified as  Sisi Medel with 1960  and the procedure verified as Flexible Fiberoptic Bronchoscopy.  A Time Out was conducted, and the above information confirmed.     Ms. Medel was intubated in the ICU. The patient was positioned supine, and the bronchoscope was passed through the ETT without difficulty.  The scope was then passed into the trachea.  Complete airway exam was not performed. The mine was sharp with no obvious abnormalities. There did appear to be scant blood in the R mainstem bronchus but no active bleeding. The scope was wedged in the RML and 120 cc was instilled and 60 cc returned. There was thick mucous noted in the airways. The scope was then removed and the patient did not have any immemdiate complications.     BAL 60 cc return from 120 cc. All appropriate labs have been drawn.     Georgie Kohler M.D.  [facility] Fellow

## 2019-08-09 NOTE — PROGRESS NOTES
Spontaneous Breathing Trial Daily Safety Screen    RR <30 = no  MAP > 60 = yes  HR < 120 = yes  VE < 150 cc/kg = yes  SpO2 > 90% = yes  FIO2 50% = yes  PEEP <8 = yes  Daily safety screen meets all criteria = yes    SBT state time = 0804  SBT end time = 0908    Parameters obtained  RSBI = 55    SBT Results:   Patient passed SBT = no  RR >35/min for 5 mins

## 2019-08-09 NOTE — ASSESSMENT & PLAN NOTE
Outpatient HD unit: [facility]   -Nephrologist: Patient does not know  -HD tx days: TThS  -HD tx time: 4hrs 45mins    -HD access: LUE AVG  -HD modality: iHD  -Residual urine: Minimal  -EDW: 109 kg     Plan:  - Continuous SLED, will evaluate dialy  - Would like f/u chest x-ray to help guide fluid removal     - Decrease hourly UF to 250-350cc  - Pt on heparin infusion, no need for citrate   - Epo ordered   - Strict I/Os and chart  - Will follow closely

## 2019-08-09 NOTE — ASSESSMENT & PLAN NOTE
Mental status questionable despite weaning sedations  - change sedation to propofol and precedex  - SAT 8/8 AM  - CT H and EEG unremarkable

## 2019-08-09 NOTE — SUBJECTIVE & OBJECTIVE
Interval History/Significant Events: Passed SAT, but failed SBT d/t hypotension and tachypnea. These findings in conjunction with immunosuppressed status and worsening leukocytosis raise concern for occult respiratory infection. Will plan for bronchoscopy later today. ID consulted.     Review of Systems   Unable to perform ROS: Intubated     Objective:     Vital Signs (Most Recent):  Temp: 99 °F (37.2 °C) (08/09/19 1345)  Pulse: 70 (08/09/19 1345)  Resp: (!) 24 (08/08/19 0541)  BP: (!) 97/55 (08/09/19 1345)  SpO2: 100 % (08/09/19 1345) Vital Signs (24h Range):  Temp:  [98.4 °F (36.9 °C)-100.2 °F (37.9 °C)] 99 °F (37.2 °C)  Pulse:  [65-76] 70  SpO2:  [60 %-100 %] 100 %  BP: ()/(52-84) 97/55   Weight: 109.8 kg (242 lb)  Body mass index is 37.9 kg/m².      Intake/Output Summary (Last 24 hours) at 8/9/2019 1407  Last data filed at 8/9/2019 1300  Gross per 24 hour   Intake 6331 ml   Output 8090 ml   Net -1759 ml       Physical Exam   Constitutional: She appears ill. She is sedated, intubated and restrained.   Morbidly obese AA female, intubated and sedated, currently on hemodialysis    HENT:   Head: Normocephalic and atraumatic.   Eyes: Pupils are equal, round, and reactive to light. Conjunctivae and EOM are normal.   Vertical gaze preference   Neck: Normal range of motion. Neck supple.   Cardiovascular: Normal rate, regular rhythm and normal heart sounds.   Pulses:       Radial pulses are 2+ on the right side, and 2+ on the left side.   Pulmonary/Chest: Effort normal. No stridor. She is intubated. No respiratory distress. She has no wheezes. She has rales.   Abdominal: Soft. Bowel sounds are normal.   Musculoskeletal: Normal range of motion. She exhibits edema. She exhibits no tenderness or deformity.   Neurological: She is alert.   Vertical gaze preference  Able to follow some commands   Skin: Skin is warm and dry.   Psychiatric:   MAGNUS, Pt intubated/sedated       Vents:  Vent Mode: A/C (08/09/19 7310)  Ventilator  Initiated: Yes (08/04/19 1139)  Set Rate: 14 bmp (08/09/19 1140)  Vt Set: 400 mL (08/09/19 1140)  Pressure Support: (S) 12 cmH20 (08/09/19 0804)  PEEP/CPAP: 8 cmH20 (08/09/19 1140)  Oxygen Concentration (%): 30 (08/09/19 1345)  Peak Airway Pressure: 31 cmH2O (08/09/19 1140)  Plateau Pressure: 27 cmH20 (08/09/19 1140)  Total Ve: 11.5 mL (08/09/19 1140)  F/VT Ratio<105 (RSBI): (!) 55.43 (08/08/19 0541)  Lines/Drains/Airways     Central Venous Catheter Line                 Trialysis (Dialysis) Catheter 08/04/19 right internal jugular 5 days         Percutaneous Central Line Insertion/Assessment - triple lumen  08/04/19 1803 left internal jugular 4 days          Drain                 Hemodialysis AV Graft Left upper arm -- days         NG/OG Tube 08/04/19 1230 5 days          Airway                 Airway - Non-Surgical 08/04/19 1139 Endotracheal Tube 5 days          Arterial Line                 Arterial Line 4 days              Significant Labs:    CBC/Anemia Profile:  Recent Labs   Lab 08/08/19  0300 08/09/19  0300   WBC 34.10* 46.97*   HGB 8.9* 9.1*   HCT 32.1* 31.5*   * 115*   MCV 89 87   RDW 21.8* 21.6*        Chemistries:  Recent Labs   Lab 08/08/19  0300  08/08/19  2200 08/09/19  0300 08/09/19  1325     136   < > 138 135*  135* 135*   K 4.9  4.9   < > 4.8 4.7  4.7 4.9     103   < > 101 100  100 104   CO2 16*  16*   < > 20* 18*  18* 18*   BUN 36*  36*   < > 12 15  15 10   CREATININE 2.8*  2.8*   < > 0.9 1.0  1.0 0.8   CALCIUM 9.3  9.3   < > 9.0 8.9  8.9 8.6*   ALBUMIN 2.6*  2.6*   < > 2.8* 2.7*  2.7* 2.8*   PROT 7.3  --   --  7.4  --    BILITOT 1.1*  --   --  1.2*  --    ALKPHOS 124  --   --  127  --    ALT 17  --   --  14  --    AST 34  --   --  28  --    MG 2.5   < > 2.0 2.3 2.0   PHOS 5.5*  5.5*   < > 2.0* 2.6*  2.6* 2.9    < > = values in this interval not displayed.       All pertinent labs within the past 24 hours have been reviewed.    Significant Imaging:  I have  reviewed all pertinent imaging results/findings within the past 24 hours.

## 2019-08-09 NOTE — PLAN OF CARE
Problem: Adult Inpatient Plan of Care  Goal: Plan of Care Review  Outcome: Ongoing (interventions implemented as appropriate)  Pt follows commands. Pt on A/C 50% FiO2 5 PEEP, O2 sats >99%. HR 70s NSR, permanent pacemaker set at 70. Last CVP 8. MAP maintained >65. Vaso @ 0.04U/min, levo @ 0.08 mcg/kg/min, precedex @ 0.4 mcg/kg/h, and heparin @ 11U/kg/h. TF @ 30cc/h. CRRT SLED currently paused and pt rinsed back; will be restarted with . Accuchecks done Q4h. Bronchoscopy done today. ID consulted. Plan for SBT tomorrow. Plan of care reviewed with pt and family. VSS at this time. Will continue to monitor. See flowsheet for full assessment details.

## 2019-08-09 NOTE — PROGRESS NOTES
[facility]  Nephrology  Progress Note    Patient Name: Sisi Medel  MRN: [id number]  Admission Date: 8/3/2019  Hospital Length of Stay: 6 days  Attending Provider: Bang Cordero MD   Primary Care Physician: Carlos A Caputo MD  Principal Problem:Acute on chronic respiratory failure with hypoxia and hypercapnia      Interval History: Pt seen on SLED this morning and is net negative about 2.3L for the past 24hrs. Pt remains sedated and ventilated, on Fi02 of 40%. Still requiring levo and vaso to maintain adequate BP. She is more awake this morning. She nods/gestures appropriately and is following commands.     Review of patient's allergies indicates:   Allergen Reactions    Bactrim [sulfamethoxazole-trimethoprim] Other (See Comments)     Causes renal failure    Nsaids (non-steroidal anti-inflammatory drug) Other (See Comments)     Renal failure     Current Facility-Administered Medications   Medication Frequency    0.9%  NaCl infusion (CRRT USE ONLY) Continuous    acetaminophen tablet 650 mg Q4H PRN    albuterol-ipratropium 2.5 mg-0.5 mg/3 mL nebulizer solution 3 mL Q6H PRN    chlorhexidine 0.12 % solution 15 mL BID    cinacalcet tablet 30 mg Daily with breakfast    clopidogrel tablet 75 mg Daily    dexmedetomidine (PRECEDEX) 400mcg/100mL 0.9% NaCL infusion Continuous    dextrose 10% (D10W) Bolus PRN    dextrose 10% (D10W) Bolus PRN    dextrose 50% injection 25 g PRN    epoetin lily-epbx injection 5,500 Units Every Mon, Wed, Fri    fentaNYL 2500 mcg in 0.9% sodium chloride 250 mL infusion premix (titrating) Continuous    fentaNYL injection 50 mcg Q1H PRN    fludrocortisone tablet 100 mcg Daily    glucagon (human recombinant) injection 1 mg PRN    glucose chewable tablet 16 g PRN    glucose chewable tablet 24 g PRN    heparin 25,000 units in dextrose 5% (100 units/ml) IV bolus from bag - ADDITIONAL PRN BOLUS - 30 units/kg PRN    heparin 25,000 units in dextrose  5% (100 units/ml) IV bolus from bag - ADDITIONAL PRN BOLUS - 60 units/kg PRN    heparin 25,000 units in dextrose 5% 250 mL (100 units/mL) infusion LOW INTENSITY nomogram - [facility] Continuous    hydrocortisone sodium succinate injection 100 mg Q8H    insulin aspart U-100 pen 0-5 Units QID (AC + HS) PRN    levETIRAcetam in NaCl (iso-os) IVPB 500 mg Q12H    LORazepam tablet 0.5 mg Q8H PRN    magnesium sulfate 2g in water 50mL IVPB (premix) PRN    miconazole NITRATE 2 % top powder BID    midodrine tablet 10 mg TID    norepinephrine 16 mg in dextrose 5 % 250 mL infusion Continuous    ondansetron disintegrating tablet 8 mg Q8H PRN    pantoprazole suspension 40 mg Daily    piperacillin-tazobactam 4.5 g in sodium chloride 0.9% 100 mL IVPB (ready to mix system) Q8H    polyethylene glycol packet 17 g Daily    prednisoLONE acetate 1 % ophthalmic suspension 1 drop TID    propofol (DIPRIVAN) 10 mg/mL infusion Continuous    senna-docusate 8.6-50 mg per tablet 1 tablet BID    sevelamer carbonate tablet 800 mg Daily with dinner    sodium chloride 0.9% flush 10 mL PRN    sodium phosphate 20.01 mmol in dextrose 5 % 250 mL IVPB PRN    sodium phosphate 30 mmol in dextrose 5 % 250 mL IVPB PRN    sodium phosphate 39.99 mmol in dextrose 5 % 250 mL IVPB PRN    tiZANidine tablet 4 mg Nightly PRN    vasopressin (PITRESSIN) 0.2 Units/mL in dextrose 5 % 100 mL infusion Continuous     Facility-Administered Medications Ordered in Other Encounters   Medication Frequency    ceFAZolin injection 2 g On Call Procedure    lidocaine (PF) 10 mg/ml (1%) injection 10 mg Once    mupirocin 2 % ointment On Call Procedure    sodium chloride 0.9% flush 10 mL PRN       Objective:     Vital Signs (Most Recent):  Temp: 99 °F (37.2 °C) (08/09/19 1345)  Pulse: 70 (08/09/19 1345)  Resp: (!) 24 (08/08/19 0541)  BP: (!) 97/55 (08/09/19 1345)  SpO2: 100 % (08/09/19 1345)  O2 Device (Oxygen Therapy): ventilator (08/09/19 1300) Vital Signs (24h  Range):  Temp:  [98.4 °F (36.9 °C)-100.2 °F (37.9 °C)] 99 °F (37.2 °C)  Pulse:  [65-76] 70  SpO2:  [60 %-100 %] 100 %  BP: ()/(52-84) 97/55     Weight: 109.8 kg (242 lb) (08/06/19 1600)  Body mass index is 37.9 kg/m².  Body surface area is 2.28 meters squared.    I/O last 3 completed shifts:  In: 15395 [I.V.:21062; NG/GT:880; IV Piggyback:200]  Out: 56053 [Other:40996]    Physical Exam   Constitutional: She is oriented to person, place, and time. She appears well-developed and well-nourished. No distress.   HENT:   Head: Normocephalic and atraumatic.   Eyes: Pupils are equal, round, and reactive to light. Conjunctivae and EOM are normal.   Neck: Normal range of motion. Neck supple.   Cardiovascular: Normal rate, regular rhythm and normal heart sounds.   Pulmonary/Chest: Effort normal. No stridor. No respiratory distress. She has no wheezes. She has rales.   Abdominal: Soft. Bowel sounds are normal.   Musculoskeletal: Normal range of motion. She exhibits edema. She exhibits no tenderness or deformity.   Neurological: She is alert and oriented to person, place, and time.   Skin: Skin is warm and dry. She is not diaphoretic.   Psychiatric:   MAGNUS, Pt intubated/sedated       Significant Labs:  ABGs:   Recent Labs   Lab 08/09/19  0927   PH 7.372   PCO2 32.6*   HCO3 18.9*   POCSATURATED 99   BE -6     CBC:   Recent Labs   Lab 08/09/19  0300   WBC 46.97*   RBC 3.62*   HGB 9.1*   HCT 31.5*   *   MCV 87   MCH 25.1*   MCHC 28.9*     CMP:   Recent Labs   Lab 08/09/19  0300   *  215*   CALCIUM 8.9  8.9   ALBUMIN 2.7*  2.7*   PROT 7.4   *  135*   K 4.7  4.7   CO2 18*  18*     100   BUN 15  15   CREATININE 1.0  1.0   ALKPHOS 127   ALT 14   AST 28   BILITOT 1.2*     All labs within the past 24 hours have been reviewed.         Assessment/Plan:     ESRD on hemodialysis  Outpatient HD unit: [facility]   -Nephrologist: Patient does not know  -HD tx days: TThS  -HD tx time: 4hrs 45mins     -HD access: LUE AVG  -HD modality: iHD  -Residual urine: Minimal  -EDW: 109 kg     Plan:  - Continuous SLED, will evaluate dialy  - Would like f/u chest x-ray to help guide fluid removal     - Decrease hourly UF to 250-350cc  - Pt on heparin infusion, no need for citrate   - Epo ordered   - Strict I/Os and chart  - Will follow closely        Thank you for your consult. I will follow-up with patient. Please contact us if you have any additional questions.    Dinh Rodriguez NP  Nephrology  [facility]

## 2019-08-09 NOTE — EICU
0664 Jose called Dr. Bang Cordero staff along with Dr. Georgie Townsend at bedside bronchoscopy. Consents obtained. Vent changes , peep +5.Fi02 increased to 100%. Inserted  Propofol Drip started per DEXTER Ruiz. Awaiting patient to relax prior to starting procedure. Informed staff if needed please call, verbalize understanding.

## 2019-08-09 NOTE — PLAN OF CARE
Problem: Adult Inpatient Plan of Care  Goal: Plan of Care Review  Outcome: Ongoing (interventions implemented as appropriate)  Pt intermittently following commands by squeezing hands. Pt on A/C 40% FiO2 5 PEEP, O2 sats >99%. HR 70s NSR, permanent pacemaker set at 70. CVP 8-9. MAP maintained >65. Vaso @ 0.04U/min, levo @ 0.08 mcg/kg/min, precedex @ 0.2 mcg/kg/h, and heparin @ 11U/kg/h. TF @ 20cc/h, residuals >200; MD aware. CRRT SLED continued with , pt tolerating well. Accuchecks done Q4h. SBT done today with RT and Dr. Cordero; pt became tachypnic after 45 minutes and was placed back on AC. Plan of care reviewed with pt and family. VSS at this time. Will continue to monitor. See flowsheet for full assessment details.

## 2019-08-10 NOTE — PLAN OF CARE
Problem: Adult Inpatient Plan of Care  Goal: Plan of Care Review  Outcome: Ongoing (interventions implemented as appropriate)  HR 60's-70's paced. MAPs maintained >65. Sats 975 and above on AC 35%, 5 PEEP. Afebrile. Heparin, levo ,vaso, precedex gtts infusing. CRRT SLED tolerating UF of 350. Tf at 30. Pt follows commands. All labs, assessments, gtts discussed with critical care team. Plan of care reviewed, BRIE.

## 2019-08-10 NOTE — SUBJECTIVE & OBJECTIVE
Interval History: remains on pressors, extubated to bipap this morning    Review of Systems   Unable to perform ROS: Acuity of condition     Objective:     Vital Signs (Most Recent):  Temp: 99 °F (37.2 °C) (08/10/19 1526)  Pulse: 70 (08/10/19 1526)  Resp: (!) 24 (08/08/19 0541)  BP: 124/66 (08/10/19 1333)  SpO2: 100 % (08/10/19 1526) Vital Signs (24h Range):  Temp:  [98.6 °F (37 °C)-99.3 °F (37.4 °C)] 99 °F (37.2 °C)  Pulse:  [67-84] 70  SpO2:  [77 %-100 %] 100 %  BP: ()/(39-79) 124/66     Weight: 109.8 kg (242 lb)  Body mass index is 37.9 kg/m².    Estimated Creatinine Clearance: 132.1 mL/min (based on SCr of 0.6 mg/dL).    Physical Exam   Constitutional: She is oriented to person, place, and time. She appears well-developed and well-nourished. No distress.   HENT:   Right Ear: External ear normal.   Left Ear: External ear normal.   Nose: Nose normal.   Eyes: Conjunctivae and EOM are normal.   Neck: Normal range of motion. Neck supple.   Cardiovascular: Normal rate, regular rhythm, normal heart sounds and intact distal pulses.   No murmur heard.  Pulmonary/Chest: Effort normal and breath sounds normal. No respiratory distress. She has no wheezes.   intubated   Abdominal: Soft. Bowel sounds are normal. She exhibits no distension. There is no tenderness.   Musculoskeletal: Normal range of motion. She exhibits no edema.   Neurological: She is alert and oriented to person, place, and time. No cranial nerve deficit. Coordination normal.   Skin: Skin is warm and dry. No rash noted. She is not diaphoretic. No erythema.   Psychiatric: She has a normal mood and affect. Her behavior is normal.   Vitals reviewed.      Significant Labs:   CBC:   Recent Labs   Lab 08/09/19  0300 08/10/19  0314   WBC 46.97* 45.07*   HGB 9.1* 9.1*   HCT 31.5* 30.9*   * 106*     CMP:   Recent Labs   Lab 08/09/19  0300 08/09/19  1325 08/09/19  2127 08/10/19  0314   *  135* 135* 135* 138  138   K 4.7  4.7 4.9 4.9 4.8  4.8   CL  100  100 104 102 102  102   CO2 18*  18* 18* 20* 20*  20*   *  215* 176* 167* 160*  160*   BUN 15  15 10 10 8  8   CREATININE 1.0  1.0 0.8 0.7 0.6  0.6   CALCIUM 8.9  8.9 8.6* 8.9 8.6*  8.6*   PROT 7.4  --   --  7.3   ALBUMIN 2.7*  2.7* 2.8* 2.7* 2.7*  2.7*   BILITOT 1.2*  --   --  1.2*   ALKPHOS 127  --   --  119   AST 28  --   --  29   ALT 14  --   --  14   ANIONGAP 17*  17* 13 13 16  16   EGFRNONAA >60.0  >60.0 >60.0 >60.0 >60.0  >60.0     Microbiology Results (last 7 days)     Procedure Component Value Units Date/Time    AFB Culture & Smear [[id number]] Collected:  08/09/19 1622    Order Status:  Completed Specimen:  Body Fluid from Lung, RML Updated:  08/10/19 1413     AFB CULTURE STAIN No acid fast bacilli seen.    Narrative:       Bronchial Wash    Culture, Respiratory [[id number]]  (Abnormal) Collected:  08/09/19 1622    Order Status:  Completed Specimen:  Respiratory from BAL, RML Updated:  08/10/19 1255     Respiratory Culture KLEBSIELLA OXYTOCA  Moderate  Susceptibility pending  Normal respiratory delio also present       Gram Stain (Respiratory) <10 epithelial cells per low power field.     Gram Stain (Respiratory) Moderate WBC's     Gram Stain (Respiratory) Rare Gram positive cocci     Gram Stain (Respiratory) Rare Gram negative rods    Narrative:       Bronchial Wash    Fungus culture [[id number]]     Order Status:  Completed Specimen:  Respiratory from BAL, RLL     Fungus culture [[id number]] Collected:  08/09/19 1622    Order Status:  Sent Specimen:  Body Fluid from Lung, RML Updated:  08/09/19 1755    Respiratory Viral Panel by PCR [facility]; Sputum (BAL ) [[id number]] Collected:  08/09/19 1628    Order Status:  Sent Specimen:  Bronchial Wash Updated:  08/09/19 1754    Gram stain [[id number]] Collected:  08/09/19 1622    Order Status:  Canceled Specimen:  Body Fluid from Lung, RML     Blood culture [[id number]] Collected:  08/04/19 1402    Order Status:  Completed Specimen:  Blood  from Peripheral, Wrist, Right Updated:  08/09/19 1612     Blood Culture, Routine No growth after 5 days.    Blood culture [[id number]] Collected:  08/04/19 1400    Order Status:  Completed Specimen:  Blood from Line, Jugular, Internal Right Updated:  08/09/19 1612     Blood Culture, Routine No growth after 5 days.    Culture, Respiratory with Gram Stain [[id number]]  (Abnormal)  (Susceptibility) Collected:  08/04/19 1505    Order Status:  Completed Specimen:  Respiratory from Endotracheal Aspirate Updated:  08/09/19 1444     Respiratory Culture PSEUDOMONAS AERUGINOSA  Moderate        STENOTROPHOMONAS (X.) MALTOPHILIA  Moderate        MORAXELLA (BRANHAMELLA) CATARRHALIS  Many  Beta Lactamase positive       Comment: Previous comment was modified by YOSELYN at 14:27 on 08/07/2019  Beta Lactamase positive  Normal respiratory delio also present           STAPHYLOCOCCUS AUREUS  Moderate  Normal respiratory delio also present       Gram Stain (Respiratory) <10 epithelial cells per low power field.     Gram Stain (Respiratory) Many Gram negative diplococci     Gram Stain (Respiratory) Many Gram negative cocci     Gram Stain (Respiratory) Few Gram positive cocci     Gram Stain (Respiratory) Few WBC's          Significant Imaging: I have reviewed all pertinent imaging results/findings within the past 24 hours.

## 2019-08-10 NOTE — PROCEDURES
[facility]  Procedure Note    SUMMARY     Date of Procedure: 08/10/2019    Procedure: Endotracheal intubation    Provider: Donna Aguilar MD    Assisting Provider: Bang Cordero MD    Indication: impending airway compromise and impending respiratory failure.  Intubation method: video-assisted  Patient status: paralyzed (RSI)  Sedatives: etomidate 20mg  Paralytic: succinycholine 100mg  Laryngoscope size: Mac 4  Tube size: 8.0 mm  Tube type: cuffed  Number of attempts: 1  Cricoid pressure: no  Cords visualized: yes  Post-procedure assessment: chest rise and CO2 detector  Breath sounds: clear and equal  Cuff inflated: yes  ETT to lip: 21 cm  Tube secured with: ETT maldonado  Post intubation CXR pending.  Patient tolerance: Patient tolerated the procedure well with no immediate complications  Complications: No  Specimens: No    Attestation: Attending physician Bang Cordero MD was present and scrubbed for the entire procedure.      Donna Aguilar MD  Pulmonary / Critical Care Fellow, PGY4  [phone]

## 2019-08-10 NOTE — ASSESSMENT & PLAN NOTE
58F PMH pulmonary sarcoid on chronic steroids, ESRD on HD presented w/ acute on chronic respiratory failure, now intubated in ICU. ID consulted for management of PNA. ETT culture from 8/4 + MSSA, steno, pseudomonas, and moraxella    Recommendations:  - continue pip-tazo for coverage of MSSA, PSA and moraxella  - add bactrim for steno coverage  - recommend fungitell and PJP PCR from bronch  - follow repeat cultures    ID will follow

## 2019-08-10 NOTE — ASSESSMENT & PLAN NOTE
On 10 mg midodrine TID at home. Currently on levophed 0.1 and vaso 0.4 based on BP cuff reads. Off propofol, only precedex for sedation  - Continue midodrine  - levophed/vaso - titrate to map 65

## 2019-08-10 NOTE — CARE UPDATE
Pt intubated by fellow, bs =/sym with bilat crackles, good etco2 det chg noted, good chest rise,m pt put on previous vent settings

## 2019-08-10 NOTE — SUBJECTIVE & OBJECTIVE
Interval History/Significant Events: Passed SAT and high RR on SBT. Plan for extubation to see how patient does. Bronchoscopy results pending. WBC stable and afebrile overnight.     Denies any pain, responding by head movement.    Review of Systems   Unable to perform ROS: Intubated   Constitutional: Negative for diaphoresis.   Cardiovascular: Negative for chest pain.   Gastrointestinal: Negative for abdominal pain.       Objective:     Vital Signs (Most Recent):  Temp: 97.6 °F (36.4 °C) (08/10/19 1900)  Pulse: 66 (08/10/19 1930)  Resp: (!) 24 (08/08/19 0541)  BP: (!) 76/50 (08/10/19 1930)  SpO2: 96 % (08/10/19 1930) Vital Signs (24h Range):  Temp:  [97.6 °F (36.4 °C)-99 °F (37.2 °C)] 97.6 °F (36.4 °C)  Pulse:  [66-84] 66  SpO2:  [77 %-100 %] 96 %  BP: ()/(39-79) 76/50   Weight: 109.8 kg (242 lb)  Body mass index is 37.9 kg/m².      Intake/Output Summary (Last 24 hours) at 8/10/2019 2000  Last data filed at 8/10/2019 1900  Gross per 24 hour   Intake 5947.89 ml   Output 8286 ml   Net -2338.11 ml       Physical Exam   Constitutional: She appears ill. She is sedated, intubated and restrained.   Morbidly obese AA female, intubated and sedated, currently on hemodialysis    HENT:   Head: Normocephalic and atraumatic.   Eyes: Pupils are equal, round, and reactive to light. Conjunctivae and EOM are normal.   Vertical gaze preference   Neck: Normal range of motion. Neck supple.   Cardiovascular: Normal rate, regular rhythm and normal heart sounds.   Pulses:       Radial pulses are 2+ on the right side, and 2+ on the left side.   Pulmonary/Chest: Effort normal. No stridor. She is intubated. No respiratory distress. She has no wheezes. She has rales.   Abdominal: Soft. Bowel sounds are normal.   Musculoskeletal: Normal range of motion. She exhibits edema. She exhibits no tenderness or deformity.   Neurological: She is alert.   Vertical gaze preference  Able to follow some commands   Skin: Skin is warm and dry.    Psychiatric:   MAGNUS, Pt intubated/sedated       Vents:  Vent Mode: A/C (08/10/19 1907)  Ventilator Initiated: Yes (08/10/19 1742)  Set Rate: 16 bmp (08/10/19 1907)  Vt Set: 400 mL (08/10/19 1907)  Pressure Support: 10 cmH20 (08/10/19 1230)  PEEP/CPAP: 5 cmH20 (08/10/19 1907)  Oxygen Concentration (%): 68 (08/10/19 1930)  Peak Airway Pressure: 32 cmH2O (08/10/19 1907)  Plateau Pressure: 0 cmH20 (08/10/19 1907)  Total Ve: 10.5 mL (08/10/19 1907)  F/VT Ratio<105 (RSBI): (!) 55.43 (08/08/19 0541)  Lines/Drains/Airways     Central Venous Catheter Line                 Percutaneous Central Line Insertion/Assessment - triple lumen  08/04/19 1803 left internal jugular 6 days         Trialysis (Dialysis) Catheter 08/04/19 right internal jugular 6 days          Drain                 Hemodialysis AV Graft Left upper arm -- days         NG/OG Tube 08/04/19 1230 6 days          Airway                 Airway - Non-Surgical 08/04/19 1139 Endotracheal Tube 6 days          Arterial Line                 Arterial Line 5 days              Significant Labs:    CBC/Anemia Profile:  Recent Labs   Lab 08/09/19  0300 08/10/19  0314   WBC 46.97* 45.07*   HGB 9.1* 9.1*   HCT 31.5* 30.9*   * 106*   MCV 87 87   RDW 21.6* 22.2*        Chemistries:  Recent Labs   Lab 08/09/19  0300  08/09/19  2127 08/10/19  0314 08/10/19  1400   *  135*   < > 135* 138  138 138   K 4.7  4.7   < > 4.9 4.8  4.8 4.7     100   < > 102 102  102 101   CO2 18*  18*   < > 20* 20*  20* 20*   BUN 15  15   < > 10 8  8 8   CREATININE 1.0  1.0   < > 0.7 0.6  0.6 0.6   CALCIUM 8.9  8.9   < > 8.9 8.6*  8.6* 8.6*   ALBUMIN 2.7*  2.7*   < > 2.7* 2.7*  2.7* 2.8*   PROT 7.4  --   --  7.3  --    BILITOT 1.2*  --   --  1.2*  --    ALKPHOS 127  --   --  119  --    ALT 14  --   --  14  --    AST 28  --   --  29  --    MG 2.3   < > 2.1 2.0 2.0   PHOS 2.6*  2.6*   < > 2.5* 1.9*  1.9* 3.1  3.1    < > = values in this interval not displayed.       All  pertinent labs within the past 24 hours have been reviewed.    Significant Imaging:  I have reviewed all pertinent imaging results/findings within the past 24 hours.  Objective:     Vital Signs (Most Recent):  Temp: 98.8 °F (37.1 °C) (08/10/19 0800)  Pulse: 71 (08/10/19 0800)  Resp: (!) 24 (08/08/19 0541)  BP: (!) 101/57 (08/10/19 0800)  SpO2: 97 % (08/10/19 0800) Vital Signs (24h Range):  Temp:  [98.4 °F (36.9 °C)-99.3 °F (37.4 °C)] 98.8 °F (37.1 °C)  Pulse:  [67-81] 71  SpO2:  [91 %-100 %] 97 %  BP: ()/(39-79) 101/57   Weight: 109.8 kg (242 lb)  Body mass index is 37.9 kg/m².      Intake/Output Summary (Last 24 hours) at 8/10/2019 0829  Last data filed at 8/10/2019 0800  Gross per 24 hour   Intake 7176.89 ml   Output 7625 ml   Net -448.11 ml       Physical Exam   Constitutional: She appears ill. She is sedated, intubated and restrained.   Morbidly obese AA female, intubated and sedated, currently on hemodialysis. Able to follow basic commands with extremities and move head. Mild chemosis    HENT:   Head: Normocephalic and atraumatic.   Right Ear: External ear normal.   Left Ear: External ear normal.   Eyes: Pupils are equal, round, and reactive to light. Conjunctivae and EOM are normal. Right eye exhibits no discharge. Left eye exhibits no discharge.   mild chemosis.   Neck: Normal range of motion. Neck supple.   Cardiovascular: Normal rate, regular rhythm and normal heart sounds.   Pulses:       Radial pulses are 2+ on the right side, and 2+ on the left side.   Pulmonary/Chest: Effort normal. No stridor. She is intubated. No respiratory distress. She has no wheezes. She has rales.   Abdominal: Soft. Bowel sounds are normal. There is no tenderness.   Musculoskeletal: Normal range of motion. She exhibits edema. She exhibits no tenderness or deformity.   Neurological: She is alert.   Vertical gaze preference  Able to follow some commands, 5/5 strength in bilateral hands   Skin: Skin is warm and dry. No  erythema.   Psychiatric:   MAGNUS, Pt intubated on precedex       Vents:  Vent Mode: Spont (08/10/19 0745)  Ventilator Initiated: Yes (08/04/19 1139)  Set Rate: 16 bmp (08/10/19 0745)  Vt Set: 400 mL (08/10/19 0745)  Pressure Support: 10 cmH20 (08/10/19 0745)  PEEP/CPAP: 5 cmH20 (08/10/19 0745)  Oxygen Concentration (%): 34 (08/10/19 0800)  Peak Airway Pressure: 15 cmH2O (08/10/19 0745)  Plateau Pressure: 26 cmH20 (08/10/19 0745)  Total Ve: 10.3 mL (08/10/19 0745)  F/VT Ratio<105 (RSBI): (!) 55.43 (08/08/19 0541)  Lines/Drains/Airways     Central Venous Catheter Line                 Trialysis (Dialysis) Catheter 08/04/19 right internal jugular 6 days         Percutaneous Central Line Insertion/Assessment - triple lumen  08/04/19 1803 left internal jugular 5 days          Drain                 Hemodialysis AV Graft Left upper arm -- days         NG/OG Tube 08/04/19 1230 5 days          Airway                 Airway - Non-Surgical 08/04/19 1139 Endotracheal Tube 5 days          Arterial Line                 Arterial Line 5 days              Significant Labs:    CBC/Anemia Profile:  Recent Labs   Lab 08/09/19  0300 08/10/19  0314   WBC 46.97* 45.07*   HGB 9.1* 9.1*   HCT 31.5* 30.9*   * 106*   MCV 87 87   RDW 21.6* 22.2*        Chemistries:  Recent Labs   Lab 08/09/19  0300 08/09/19  1325 08/09/19  2127 08/10/19  0314   *  135* 135* 135* 138  138   K 4.7  4.7 4.9 4.9 4.8  4.8     100 104 102 102  102   CO2 18*  18* 18* 20* 20*  20*   BUN 15  15 10 10 8  8   CREATININE 1.0  1.0 0.8 0.7 0.6  0.6   CALCIUM 8.9  8.9 8.6* 8.9 8.6*  8.6*   ALBUMIN 2.7*  2.7* 2.8* 2.7* 2.7*  2.7*   PROT 7.4  --   --  7.3   BILITOT 1.2*  --   --  1.2*   ALKPHOS 127  --   --  119   ALT 14  --   --  14   AST 28  --   --  29   MG 2.3 2.0 2.1 2.0   PHOS 2.6*  2.6* 2.9 2.5* 1.9*  1.9*       All pertinent labs within the past 24 hours have been reviewed.    Significant Imaging:  I have reviewed all pertinent imaging  results/findings within the past 24 hours.

## 2019-08-10 NOTE — NURSING
Reintubated 8.0 ett at 22 to lip. Assist control 16/400/100%/+5. Sats 97%. NIBP 98/72 (74). HR 69, paced.

## 2019-08-10 NOTE — ASSESSMENT & PLAN NOTE
58F PMH pulmonary sarcoid on chronic steroids, ESRD on HD presented w/ acute on chronic respiratory failure, now intubated in ICU. ID consulted for management of PNA. ETT culture from 8/4 + MSSA, steno, pseudomonas, and moraxella    Recommendations:  - continue pip-tazo for coverage of MSSA, PSA and moraxella  - continue bactrim for steno coverage  - repeat respiratory cx + klebsiella oxytoca - await sensitivities  - fungitell and PJP PCR from bronch on 8/8 is pending  - please call if patient has any further hemodynamic decompensation    ID will follow

## 2019-08-10 NOTE — SUBJECTIVE & OBJECTIVE
Past Medical History:   Diagnosis Date    Anemia in ESRD (end-stage renal disease) 3/7/2016    Anticoagulant long-term use     Cervical radiculopathy 2015    CHF (congestive heart failure)     Chronic combined systolic and diastolic heart failure 2013    EF 20% , improved with Medical therapy, negative PET     Chronic respiratory failure with hypoxia 2013    On home oxygen at 2-5 liters    Closed fracture of proximal end of right fibula 2016    Complications due to renal dialysis device, implant, and graft     DDD (degenerative disc disease), lumbar 2015    Dependence on renal dialysis     Diabetes mellitus type II, controlled 2017    ESRD on hemodialysis 2016    Essential hypertension     Gout     Lateral meniscal tear 2016    Lumbar stenosis 2015    Obesity, Class III, BMI 40-49.9 (morbid obesity)     Pacemaker     Paroxysmal atrial fibrillation 2014    Not on anticoagulation    Peripheral neuropathy 2013    Personal history of gastric ulcer 3/19/2013    Pneumonia of left lower lobe due to infectious organism 3/10/2019    Sarcoidosis, lung 2009    Diagnosed in  with ocular manifestation, on 4L home O2 and PO steroids    Secondary pulmonary hypertension 2015    Seizure disorder 3/19/2013    Shingles 2013    Thyroid disease        Past Surgical History:   Procedure Laterality Date    ABDOMINAL SURGERY      ABLATION N/A 2017    Performed by Bladimir Adorno MD at [facility] CATH LAB    ANGIOPLASTY Left 1/10/2018    Performed by Torrey Villafana MD at [facility] OR 2ND FLR    ANGIOPLASTY-PERCUTANEOUS TRANSLUMINAL (PTA) Left 2017    Performed by Torrey Villafana MD at [facility] OR 2ND FLR    ANGIOPLASTY-PERCUTANEOUS TRANSLUMINAL (PTA) Left 10/12/2016    Performed by Torrey Villafana MD at [facility] OR 2ND FLR    CARDIAC PACEMAKER PLACEMENT       SECTION      x2    DECLOT GRAFT PERCUTANEOUS Left  2/7/2017    Performed by [facility] at [facility] OR 2ND FLR    DECLOT-GRAFT Left 6/21/2016    Performed by Harjit Satrr MD at [facility] CATH LAB    DECLOT-GRAFT Left 6/20/2016    Performed by Harjit Starr MD at [facility] CATH LAB    ECHOCARDIOGRAM-TRANSESOPHAGEAL N/A 6/27/2013    Performed by Delmy Surgeon at [facility] DELMY    Fistulogram Left 7/24/2019    Performed by Torrey Villafana MD at [facility] CATH LAB    Fistulogram Left 4/8/2019    Performed by Torrey Villafana MD at [facility] CATH LAB    fistulogram Left 1/10/2018    Performed by Torrey Villafana MD at [facility] OR 2ND FLR    FISTULOGRAM Left 9/13/2017    Performed by Torrey Villafana MD at [facility] CATH LAB    FISTULOGRAM Left 10/12/2016    Performed by Torrey Villafana MD at [facility] OR 2ND FLR    FISTULOGRAM Left 6/21/2016    Performed by Harjit Starr MD at [facility] CATH LAB    Fistulogram, LUE AVG, possible intervention Left 1/30/2019    Performed by Torrey Villafana MD at [facility] CATH LAB    Fistulogram, OR 11 Left 5/8/2019    Performed by Torrey Villafana MD at [facility] OR 2ND FLR    INJECTION-STEROID-EPIDURAL-TRANSFORAMINAL Right 7/20/2015    Performed by Patria Gutierrez MD at [facility], [place], operated by [facility] PAIN MGT    INJECTION-STEROID-EPIDURAL-TRANSFORAMINAL Right 5/21/2015    Performed by Patria Gutierrez MD at [facility], [place], operated by [facility] PAIN MGT    ZNQRYQXZM-PAFWF-RVVKCOQQCIIXB / Left upper AV graft. Left 2/3/2016    Performed by Torrey Villafana MD at [facility] OR 2ND FLR    UBVYBSESO-RBBAXTXJO-OKAAAQLMEYGBN N/A 1/11/2017    Performed by Bladimir Adorno MD at [facility] CATH LAB    OTHER SURGICAL HISTORY      loop recorder implant    PLACEMENT-STENT Left 2/7/2017    Performed by Torrey Villafana MD at [facility] OR 2ND FLR    PTA, AV FISTULA N/A 7/24/2019    Performed by Torrey Villafana MD at [facility] CATH LAB    PTA, AV FISTULA N/A 1/30/2019    Performed by Torrey Villafana MD at [facility] CATH LAB    stent to fistula      Stent, Fistula  7/24/2019    Performed by Torrey Villafana,  MD at [facility] CATH LAB    TRANSESOPHAGEAL ECHOCARDIOGRAM (JARAD) N/A 6/27/2016    Performed by Sourav Machuca MD at [facility] CATH LAB    ULTRASOUND, UPPER GI TRACT, ENDOSCOPIC N/A 1/9/2014    Performed by Stewart Burgess MD at [facility] ENDO (2ND FLR)    VASCULAR SURGERY      fistula to L upper arm        Review of patient's allergies indicates:   Allergen Reactions    Bactrim [sulfamethoxazole-trimethoprim] Other (See Comments)     Causes renal failure    Nsaids (non-steroidal anti-inflammatory drug) Other (See Comments)     Renal failure       Medications:  Medications Prior to Admission   Medication Sig    atorvastatin (LIPITOR) 80 MG tablet TAKE 1 TABLET BY MOUTH EVERY EVENING    blood sugar diagnostic Strp 1 each by Misc.(Non-Drug; Combo Route) route once daily. (Patient taking differently: 1 each by Misc.(Non-Drug; Combo Route) route 2 (two) times daily. )    cinacalcet (SENSIPAR) 30 MG Tab Take 30 mg by mouth daily with breakfast.    clopidogrel (PLAVIX) 75 mg tablet TAKE 1 TABLET(75 MG) BY MOUTH EVERY DAY    fludrocortisone (FLORINEF) 0.1 mg Tab Take 2 tablets (200 mcg total) by mouth 2 (two) times daily.    lancets Misc 1 each by Misc.(Non-Drug; Combo Route) route once daily. (Patient taking differently: 1 each by Misc.(Non-Drug; Combo Route) route 2 (two) times daily. )    levETIRAcetam (KEPPRA) 500 MG Tab TAKE 1 TABLET BY MOUTH TWICE DAILY    LORazepam (ATIVAN) 1 MG tablet Take 0.5 tablets (0.5 mg total) by mouth every 8 (eight) hours as needed for Anxiety.    miconazole NITRATE 2 % (MICOTIN) 2 % top powder Apply topically 2 (two) times daily.    midodrine (PROAMATINE) 10 MG tablet Take 1 tablet (10 mg total) by mouth 3 (three) times daily.    nortriptyline (PAMELOR) 25 MG capsule Take 1 capsule by mouth nightly    omeprazole (PRILOSEC) 20 MG capsule TAKE 1 CAPSULE BY MOUTH EVERY DAY    oxyCODONE-acetaminophen (PERCOCET) 5-325 mg per tablet Take 1 tablet by mouth 3 (three) times daily as needed  (pain >4 after acetaminophen given).    prednisoLONE acetate (PRED FORTE) 1 % DrpS INSTILL ONE DROP INTO  LEFT EYE THREE TIMES A DAY    predniSONE (DELTASONE) 20 MG tablet Take 1 tablet (20 mg total) by mouth once daily.    PROLENSA 0.07 % Drop INSTILL 1 DROP IN LEFT / RIGHT EYE QD    DENISE-LINDA 0.8 mg Tab TAKE 1 TABLET BY MOUTH ONCE DAILY    senna-docusate 8.6-50 mg (PERICOLACE) 8.6-50 mg per tablet Take 1 tablet by mouth 2 (two) times daily.    sevelamer carbonate (RENVELA) 800 mg Tab Take 1 tablet (800 mg total) by mouth daily with dinner or evening meal.    tiZANidine (ZANAFLEX) 4 MG tablet Take 4 mg by mouth nightly as needed.    warfarin (COUMADIN) 5 MG tablet Take 1 tablet (5mg)  by mouth every Tues, Thurs, Sat and 1/2 tablet (2.5mg) every Mon, Wed, Fri. And Sun.    lisinopril (PRINIVIL,ZESTRIL) 2.5 MG tablet Take 1 tablet (2.5 mg total) by mouth once daily. Hold for systolic BP <100    multivitamin (ONE DAILY MULTIVITAMIN) per tablet Take 1 tablet by mouth once daily.    patiromer calcium sorbitex (VELTASSA) 16.8 gram PwPk Hold until follow-up with PCP    TRADJENTA 5 mg Tab tablet TAKE 1 TABLET(5 MG) BY MOUTH EVERY DAY     Antibiotics (From admission, onward)    Start     Stop Route Frequency Ordered    08/09/19 2100  sulfamethoxazole-trimethoprim 800-160mg per tablet 1 tablet      -- PER G TUBE 2 times daily 08/09/19 1851    08/08/19 1700  piperacillin-tazobactam 4.5 g in sodium chloride 0.9% 100 mL IVPB (ready to mix system)      -- IV Every 8 hours (non-standard times) 08/08/19 1412        Antifungals (From admission, onward)    Start     Stop Route Frequency Ordered    08/03/19 2100  miconazole NITRATE 2 % top powder      -- Top 2 times daily 08/03/19 1814        Antivirals (From admission, onward)    None           Immunization History   Administered Date(s) Administered    Influenza 10/14/2009, 09/21/2010, 09/12/2011, 10/04/2012, 09/15/2014, 09/16/2015, 09/01/2016    Influenza -  Quadrivalent - PF 09/15/2014, 2016    Influenza - Trivalent (ADULT) 2013, 09/15/2014, 2015    PPD Test 2010, 2016, 2019    Pneumococcal Conjugate - 13 Valent 2018    Pneumococcal Polysaccharide - 23 Valent 2012, 2016    Td (ADULT) 2008    Tdap 2018       Family History     Problem Relation (Age of Onset)    Coronary artery disease Father    Diabetes Mother, Father, Maternal Grandmother    Hypertension Mother, Father    Kidney failure Mother    Lupus Sister        Social History     Socioeconomic History    Marital status: Single     Spouse name: Not on file    Number of children: Not on file    Years of education: Not on file    Highest education level: Not on file   Occupational History    Not on file   Social Needs    Financial resource strain: Not on file    Food insecurity:     Worry: Not on file     Inability: Not on file    Transportation needs:     Medical: Not on file     Non-medical: Not on file   Tobacco Use    Smoking status: Former Smoker     Packs/day: 0.50     Years: 10.00     Pack years: 5.00     Types: Cigarettes     Last attempt to quit: 1994     Years since quittin.3    Smokeless tobacco: Never Used   Substance and Sexual Activity    Alcohol use: No     Alcohol/week: 0.0 oz     Comment: rarely    Drug use: No    Sexual activity: Not Currently   Lifestyle    Physical activity:     Days per week: Not on file     Minutes per session: Not on file    Stress: Not on file   Relationships    Social connections:     Talks on phone: Not on file     Gets together: Not on file     Attends [employer] service: Not on file     Active member of club or organization: Not on file     Attends meetings of clubs or organizations: Not on file     Relationship status: Not on file   Other Topics Concern    Are you pregnant or think you may be? No    Breast-feeding No   Social History Narrative    Lives with boyfriend/partner  who helps with her care.      Review of Systems   Unable to perform ROS: Intubated     Objective:     Vital Signs (Most Recent):  Temp: 99 °F (37.2 °C) (08/09/19 1900)  Pulse: 70 (08/09/19 1900)  Resp: (!) 24 (08/08/19 0541)  BP: (!) 89/55 (08/09/19 1900)  SpO2: 100 % (08/09/19 1900) Vital Signs (24h Range):  Temp:  [98.4 °F (36.9 °C)-100.2 °F (37.9 °C)] 99 °F (37.2 °C)  Pulse:  [65-81] 70  SpO2:  [60 %-100 %] 100 %  BP: ()/(52-84) 89/55     Weight: 109.8 kg (242 lb)  Body mass index is 37.9 kg/m².    Estimated Creatinine Clearance: 99.1 mL/min (based on SCr of 0.8 mg/dL).    Physical Exam   Constitutional: She is oriented to person, place, and time. She appears well-developed and well-nourished. No distress.   HENT:   Right Ear: External ear normal.   Left Ear: External ear normal.   Nose: Nose normal.   Eyes: Conjunctivae and EOM are normal.   Neck: Normal range of motion. Neck supple.   Cardiovascular: Normal rate, regular rhythm, normal heart sounds and intact distal pulses.   No murmur heard.  Pulmonary/Chest: Effort normal and breath sounds normal. No respiratory distress. She has no wheezes.   intubated   Abdominal: Soft. Bowel sounds are normal. She exhibits no distension. There is no tenderness.   Musculoskeletal: Normal range of motion. She exhibits no edema.   Neurological: She is alert and oriented to person, place, and time. No cranial nerve deficit. Coordination normal.   Skin: Skin is warm and dry. No rash noted. She is not diaphoretic. No erythema.   Psychiatric: She has a normal mood and affect. Her behavior is normal.   Vitals reviewed.      Significant Labs:   CBC:   Recent Labs   Lab 08/08/19 0300 08/09/19 0300   WBC 34.10* 46.97*   HGB 8.9* 9.1*   HCT 32.1* 31.5*   * 115*     CMP:   Recent Labs   Lab 08/08/19 0300  08/08/19  2200 08/09/19  0300 08/09/19  1325     136   < > 138 135*  135* 135*   K 4.9  4.9   < > 4.8 4.7  4.7 4.9     103   < > 101 100  100 104    CO2 16*  16*   < > 20* 18*  18* 18*   *  216*   < > 170* 215*  215* 176*   BUN 36*  36*   < > 12 15  15 10   CREATININE 2.8*  2.8*   < > 0.9 1.0  1.0 0.8   CALCIUM 9.3  9.3   < > 9.0 8.9  8.9 8.6*   PROT 7.3  --   --  7.4  --    ALBUMIN 2.6*  2.6*   < > 2.8* 2.7*  2.7* 2.8*   BILITOT 1.1*  --   --  1.2*  --    ALKPHOS 124  --   --  127  --    AST 34  --   --  28  --    ALT 17  --   --  14  --    ANIONGAP 17*  17*   < > 17* 17*  17* 13   EGFRNONAA 18.1*  18.1*   < > >60.0 >60.0  >60.0 >60.0    < > = values in this interval not displayed.     Microbiology Results (last 7 days)     Procedure Component Value Units Date/Time    Culture, Respiratory [[id number]] Collected:  08/09/19 1622    Order Status:  Sent Specimen:  Respiratory from BAL, RML Updated:  08/09/19 1757    AFB Culture & Smear [[id number]] Collected:  08/09/19 1622    Order Status:  Sent Specimen:  Body Fluid from Lung, RML Updated:  08/09/19 1755    Fungus culture [[id number]] Collected:  08/09/19 1622    Order Status:  Sent Specimen:  Body Fluid from Lung, RML Updated:  08/09/19 1755    Respiratory Viral Panel by PCR [facility]; Sputum (BAL ) [[id number]] Collected:  08/09/19 1628    Order Status:  Sent Specimen:  Bronchial Wash Updated:  08/09/19 1754    Gram stain [[id number]] Collected:  08/09/19 1622    Order Status:  Canceled Specimen:  Body Fluid from Lung, RML     Blood culture [[id number]] Collected:  08/04/19 1402    Order Status:  Completed Specimen:  Blood from Peripheral, Wrist, Right Updated:  08/09/19 1612     Blood Culture, Routine No growth after 5 days.    Blood culture [[id number]] Collected:  08/04/19 1400    Order Status:  Completed Specimen:  Blood from Line, Jugular, Internal Right Updated:  08/09/19 1612     Blood Culture, Routine No growth after 5 days.    Culture, Respiratory with Gram Stain [[id number]]  (Abnormal)  (Susceptibility) Collected:  08/04/19 1505    Order Status:  Completed Specimen:  Respiratory from  Endotracheal Aspirate Updated:  08/09/19 1444     Respiratory Culture PSEUDOMONAS AERUGINOSA  Moderate        STENOTROPHOMONAS (X.) MALTOPHILIA  Moderate        MORAXELLA (BRANHAMELLA) CATARRHALIS  Many  Beta Lactamase positive       Comment: Previous comment was modified by [email or web address] at 14:27 on 08/07/2019  Beta Lactamase positive  Normal respiratory delio also present           STAPHYLOCOCCUS AUREUS  Moderate  Normal respiratory delio also present       Gram Stain (Respiratory) <10 epithelial cells per low power field.     Gram Stain (Respiratory) Many Gram negative diplococci     Gram Stain (Respiratory) Many Gram negative cocci     Gram Stain (Respiratory) Few Gram positive cocci     Gram Stain (Respiratory) Few WBC's          Significant Imaging: I have reviewed all pertinent imaging results/findings within the past 24 hours.

## 2019-08-10 NOTE — HPI
58F PMH ESRD on HD, CHF, chronic respiratory failure 2/2 pulmonary sarcoid on chronic prednisone, DM2, Afib who presented from HD w/ worsening respiratory status on 8/3. She is now intubated in ICU. ETT aspirate positive with multiple organisms on 8/4. Patient has been on pip-tazo for several days w/ worsening WBC and continues to require pressors. CXR b/l infiltrates. Culture + moraxella, MSSA, PSA and steno. Patient is unable to provide further history as she remains intubated. She underwent bronch today w/ repeat culture ordered.

## 2019-08-10 NOTE — CONSULTS
[facility]  Infectious Disease  Consult Note    Patient Name: Sisi Medel  MRN: [id number]  Admission Date: 8/3/2019  Hospital Length of Stay: 6 days  Attending Physician: Bang Cordero MD  Primary Care Provider: Carlos A Caputo MD     Isolation Status: No active isolations    Patient information was obtained from past medical records and ER records.      Inpatient consult to Infectious Diseases  Consult performed by: Priti Lozano DO  Consult ordered by: Juan Ramon Cabrera DO        Assessment/Plan:     * Acute on chronic respiratory failure with hypoxia and hypercapnia  58F PMH pulmonary sarcoid on chronic steroids, ESRD on HD presented w/ acute on chronic respiratory failure, now intubated in ICU. ID consulted for management of PNA. ETT culture from 8/4 + MSSA, steno, pseudomonas, and moraxella    Recommendations:  - continue pip-tazo for coverage of MSSA, PSA and moraxella  - add bactrim for steno coverage  - recommend fungitell and PJP PCR from bronch  - follow repeat cultures    ID will follow        Thank you for your consult. I will follow-up with patient. Please contact us if you have any additional questions.      Varsha Lozano DO  Transplant ID  Infectious Disease Fellow  C: [phone]  P: [phone]      Subjective:     Principal Problem: Acute on chronic respiratory failure with hypoxia and hypercapnia    HPI: 58F PMH ESRD on HD, CHF, chronic respiratory failure 2/2 pulmonary sarcoid on chronic prednisone, DM2, Afib who presented from HD w/ worsening respiratory status on 8/3. She is now intubated in ICU. ETT aspirate positive with multiple organisms on 8/4. Patient has been on pip-tazo for several days w/ worsening WBC and continues to require pressors. CXR b/l infiltrates. Culture + moraxella, MSSA, PSA and steno. Patient is unable to provide further history as she remains intubated. She underwent bronch today w/ repeat culture ordered.    Past Medical  History:   Diagnosis Date    Anemia in ESRD (end-stage renal disease) 3/7/2016    Anticoagulant long-term use     Cervical radiculopathy 2015    CHF (congestive heart failure)     Chronic combined systolic and diastolic heart failure 2013    EF 20% , improved with Medical therapy, negative PET     Chronic respiratory failure with hypoxia 2013    On home oxygen at 2-5 liters    Closed fracture of proximal end of right fibula 2016    Complications due to renal dialysis device, implant, and graft     DDD (degenerative disc disease), lumbar 2015    Dependence on renal dialysis     Diabetes mellitus type II, controlled 2017    ESRD on hemodialysis 2016    Essential hypertension     Gout     Lateral meniscal tear 2016    Lumbar stenosis 2015    Obesity, Class III, BMI 40-49.9 (morbid obesity)     Pacemaker     Paroxysmal atrial fibrillation 2014    Not on anticoagulation    Peripheral neuropathy 2013    Personal history of gastric ulcer 3/19/2013    Pneumonia of left lower lobe due to infectious organism 3/10/2019    Sarcoidosis, lung 2009    Diagnosed in  with ocular manifestation, on 4L home O2 and PO steroids    Secondary pulmonary hypertension 2015    Seizure disorder 3/19/2013    Shingles 2013    Thyroid disease        Past Surgical History:   Procedure Laterality Date    ABDOMINAL SURGERY      ABLATION N/A 2017    Performed by [facility]orno MD at [facility] CATH LAB    ANGIOPLASTY Left 1/10/2018    Performed by Torrey Villafana MD at [facility] OR 2ND FLR    ANGIOPLASTY-PERCUTANEOUS TRANSLUMINAL (PTA) Left 2017    Performed by [facility]ana MD at [facility] OR 2ND FLR    ANGIOPLASTY-PERCUTANEOUS TRANSLUMINAL (PTA) Left 10/12/2016    Performed by [facility]ana MD at [facility] OR 2ND FLR    CARDIAC PACEMAKER PLACEMENT       SECTION      x2    DECLOT GRAFT PERCUTANEOUS Left 2017     Performed by [facility] MD at [facility] OR 2ND FLR    DECLOT-GRAFT Left 6/21/2016    Performed by Harjit Starr MD at [facility] CATH LAB    DECLOT-GRAFT Left 6/20/2016    Performed by Harjit Starr MD at [facility] CATH LAB    ECHOCARDIOGRAM-TRANSESOPHAGEAL N/A 6/27/2013    Performed by Delmy Surgeon at [facility] DELMY    Fistulogram Left 7/24/2019    Performed by Torrey Villafana MD at [facility] CATH LAB    Fistulogram Left 4/8/2019    Performed by Torrey Villafana MD at [facility] CATH LAB    fistulogram Left 1/10/2018    Performed by Torrey Villafana MD at [facility] OR 2ND FLR    FISTULOGRAM Left 9/13/2017    Performed by Torrey Villafana MD at [facility] CATH LAB    FISTULOGRAM Left 10/12/2016    Performed by Torrey Villafana MD at [facility] OR 2ND FLR    FISTULOGRAM Left 6/21/2016    Performed by Harjit Starr MD at [facility] CATH LAB    Fistulogram, LUE AVG, possible intervention Left 1/30/2019    Performed by Torrey Villafana MD at [facility] CATH LAB    Fistulogram, OR 11 Left 5/8/2019    Performed by Torrey Villafana MD at [facility] OR 2ND FLR    INJECTION-STEROID-EPIDURAL-TRANSFORAMINAL Right 7/20/2015    Performed by Patria Gutierrez MD at [facility] PAIN MGT    INJECTION-STEROID-EPIDURAL-TRANSFORAMINAL Right 5/21/2015    Performed by Patria Gutierrez MD at [facility] PAIN MGT    [id number] / Left upper AV graft. Left 2/3/2016    Performed by Torrey Villafana MD at [facility] OR 2ND FLR    CDLWGCFQP-CZHHXQCQF-THEEDEQIFYEUM N/A 1/11/2017    Performed by Bladimir Adorno MD at [facility] CATH LAB    OTHER SURGICAL HISTORY      loop recorder implant    PLACEMENT-STENT Left 2/7/2017    Performed by Torrey Villafana MD at [facility] OR 2ND FLR    PTA, AV FISTULA N/A 7/24/2019    Performed by Torrey Villafana MD at [facility] CATH LAB    PTA, AV FISTULA N/A 1/30/2019    Performed by Torrey Villafana MD at [facility] CATH LAB    stent to fistula      Stent, Fistula  7/24/2019    Performed by Torrey Villafana MD at [facility]  CATH LAB    TRANSESOPHAGEAL ECHOCARDIOGRAM (JARAD) N/A 6/27/2016    Performed by [facility]rav Machuca MD at [facility] CATH LAB    ULTRASOUND, UPPER GI TRACT, ENDOSCOPIC N/A 1/9/2014    Performed by Stewart Burgess MD at [facility] ENDO (2ND FLR)    VASCULAR SURGERY      fistula to L upper arm        Review of patient's allergies indicates:   Allergen Reactions    Bactrim [sulfamethoxazole-trimethoprim] Other (See Comments)     Causes renal failure    Nsaids (non-steroidal anti-inflammatory drug) Other (See Comments)     Renal failure       Medications:  Medications Prior to Admission   Medication Sig    atorvastatin (LIPITOR) 80 MG tablet TAKE 1 TABLET BY MOUTH EVERY EVENING    blood sugar diagnostic Strp 1 each by Misc.(Non-Drug; Combo Route) route once daily. (Patient taking differently: 1 each by Misc.(Non-Drug; Combo Route) route 2 (two) times daily. )    cinacalcet (SENSIPAR) 30 MG Tab Take 30 mg by mouth daily with breakfast.    clopidogrel (PLAVIX) 75 mg tablet TAKE 1 TABLET(75 MG) BY MOUTH EVERY DAY    fludrocortisone (FLORINEF) 0.1 mg Tab Take 2 tablets (200 mcg total) by mouth 2 (two) times daily.    lancets Misc 1 each by Misc.(Non-Drug; Combo Route) route once daily. (Patient taking differently: 1 each by Misc.(Non-Drug; Combo Route) route 2 (two) times daily. )    levETIRAcetam (KEPPRA) 500 MG Tab TAKE 1 TABLET BY MOUTH TWICE DAILY    LORazepam (ATIVAN) 1 MG tablet Take 0.5 tablets (0.5 mg total) by mouth every 8 (eight) hours as needed for Anxiety.    miconazole NITRATE 2 % (MICOTIN) 2 % top powder Apply topically 2 (two) times daily.    midodrine (PROAMATINE) 10 MG tablet Take 1 tablet (10 mg total) by mouth 3 (three) times daily.    nortriptyline (PAMELOR) 25 MG capsule Take 1 capsule by mouth nightly    omeprazole (PRILOSEC) 20 MG capsule TAKE 1 CAPSULE BY MOUTH EVERY DAY    oxyCODONE-acetaminophen (PERCOCET) 5-325 mg per tablet Take 1 tablet by mouth 3 (three) times daily as needed (pain >4  after acetaminophen given).    prednisoLONE acetate (PRED FORTE) 1 % DrpS INSTILL ONE DROP INTO  LEFT EYE THREE TIMES A DAY    predniSONE (DELTASONE) 20 MG tablet Take 1 tablet (20 mg total) by mouth once daily.    PROLENSA 0.07 % Drop INSTILL 1 DROP IN LEFT / RIGHT EYE QD    DENISE-LINDA 0.8 mg Tab TAKE 1 TABLET BY MOUTH ONCE DAILY    senna-docusate 8.6-50 mg (PERICOLACE) 8.6-50 mg per tablet Take 1 tablet by mouth 2 (two) times daily.    sevelamer carbonate (RENVELA) 800 mg Tab Take 1 tablet (800 mg total) by mouth daily with dinner or evening meal.    tiZANidine (ZANAFLEX) 4 MG tablet Take 4 mg by mouth nightly as needed.    warfarin (COUMADIN) 5 MG tablet Take 1 tablet (5mg)  by mouth every Tues, Thurs, Sat and 1/2 tablet (2.5mg) every Mon, Wed, Fri. And Sun.    lisinopril (PRINIVIL,ZESTRIL) 2.5 MG tablet Take 1 tablet (2.5 mg total) by mouth once daily. Hold for systolic BP <100    multivitamin (ONE DAILY MULTIVITAMIN) per tablet Take 1 tablet by mouth once daily.    patiromer calcium sorbitex (VELTASSA) 16.8 gram PwPk Hold until follow-up with PCP    TRADJENTA 5 mg Tab tablet TAKE 1 TABLET(5 MG) BY MOUTH EVERY DAY     Antibiotics (From admission, onward)    Start     Stop Route Frequency Ordered    08/09/19 2100  sulfamethoxazole-trimethoprim 800-160mg per tablet 1 tablet      -- PER G TUBE 2 times daily 08/09/19 1851    08/08/19 1700  piperacillin-tazobactam 4.5 g in sodium chloride 0.9% 100 mL IVPB (ready to mix system)      -- IV Every 8 hours (non-standard times) 08/08/19 1412        Antifungals (From admission, onward)    Start     Stop Route Frequency Ordered    08/03/19 2100  miconazole NITRATE 2 % top powder      -- Top 2 times daily 08/03/19 1814        Antivirals (From admission, onward)    None           Immunization History   Administered Date(s) Administered    Influenza 10/14/2009, 09/21/2010, 09/12/2011, 10/04/2012, 09/15/2014, 09/16/2015, 09/01/2016    Influenza - Quadrivalent - PF  09/15/2014, 2016    Influenza - Trivalent (ADULT) 2013, 09/15/2014, 2015    PPD Test 2010, 2016, 2019    Pneumococcal Conjugate - 13 Valent 2018    Pneumococcal Polysaccharide - 23 Valent 2012, 2016    Td (ADULT) 2008    Tdap 2018       Family History     Problem Relation (Age of Onset)    Coronary artery disease Father    Diabetes Mother, Father, Maternal Grandmother    Hypertension Mother, Father    Kidney failure Mother    Lupus Sister        Social History     Socioeconomic History    Marital status: Single     Spouse name: Not on file    Number of children: Not on file    Years of education: Not on file    Highest education level: Not on file   Occupational History    Not on file   Social Needs    Financial resource strain: Not on file    Food insecurity:     Worry: Not on file     Inability: Not on file    Transportation needs:     Medical: Not on file     Non-medical: Not on file   Tobacco Use    Smoking status: Former Smoker     Packs/day: 0.50     Years: 10.00     Pack years: 5.00     Types: Cigarettes     Last attempt to quit: 1994     Years since quittin.3    Smokeless tobacco: Never Used   Substance and Sexual Activity    Alcohol use: No     Alcohol/week: 0.0 oz     Comment: rarely    Drug use: No    Sexual activity: Not Currently   Lifestyle    Physical activity:     Days per week: Not on file     Minutes per session: Not on file    Stress: Not on file   Relationships    Social connections:     Talks on phone: Not on file     Gets together: Not on file     Attends Islam service: Not on file     Active member of club or organization: Not on file     Attends meetings of clubs or organizations: Not on file     Relationship status: Not on file   Other Topics Concern    Are you pregnant or think you may be? No    Breast-feeding No   Social History Narrative    Lives with boyfriend/partner who helps with  her care.      Review of Systems   Unable to perform ROS: Intubated     Objective:     Vital Signs (Most Recent):  Temp: 99 °F (37.2 °C) (08/09/19 1900)  Pulse: 70 (08/09/19 1900)  Resp: (!) 24 (08/08/19 0541)  BP: (!) 89/55 (08/09/19 1900)  SpO2: 100 % (08/09/19 1900) Vital Signs (24h Range):  Temp:  [98.4 °F (36.9 °C)-100.2 °F (37.9 °C)] 99 °F (37.2 °C)  Pulse:  [65-81] 70  SpO2:  [60 %-100 %] 100 %  BP: ()/(52-84) 89/55     Weight: 109.8 kg (242 lb)  Body mass index is 37.9 kg/m².    Estimated Creatinine Clearance: 99.1 mL/min (based on SCr of 0.8 mg/dL).    Physical Exam   Constitutional: She is oriented to person, place, and time. She appears well-developed and well-nourished. No distress.   HENT:   Right Ear: External ear normal.   Left Ear: External ear normal.   Nose: Nose normal.   Eyes: Conjunctivae and EOM are normal.   Neck: Normal range of motion. Neck supple.   Cardiovascular: Normal rate, regular rhythm, normal heart sounds and intact distal pulses.   No murmur heard.  Pulmonary/Chest: Effort normal and breath sounds normal. No respiratory distress. She has no wheezes.   intubated   Abdominal: Soft. Bowel sounds are normal. She exhibits no distension. There is no tenderness.   Musculoskeletal: Normal range of motion. She exhibits no edema.   Neurological: She is alert and oriented to person, place, and time. No cranial nerve deficit. Coordination normal.   Skin: Skin is warm and dry. No rash noted. She is not diaphoretic. No erythema.   Psychiatric: She has a normal mood and affect. Her behavior is normal.   Vitals reviewed.      Significant Labs:   CBC:   Recent Labs   Lab 08/08/19  0300 08/09/19  0300   WBC 34.10* 46.97*   HGB 8.9* 9.1*   HCT 32.1* 31.5*   * 115*     CMP:   Recent Labs   Lab 08/08/19  0300  08/08/19  2200 08/09/19  0300 08/09/19  1325     136   < > 138 135*  135* 135*   K 4.9  4.9   < > 4.8 4.7  4.7 4.9     103   < > 101 100  100 104   CO2 16*  16*    < > 20* 18*  18* 18*   *  216*   < > 170* 215*  215* 176*   BUN 36*  36*   < > 12 15  15 10   CREATININE 2.8*  2.8*   < > 0.9 1.0  1.0 0.8   CALCIUM 9.3  9.3   < > 9.0 8.9  8.9 8.6*   PROT 7.3  --   --  7.4  --    ALBUMIN 2.6*  2.6*   < > 2.8* 2.7*  2.7* 2.8*   BILITOT 1.1*  --   --  1.2*  --    ALKPHOS 124  --   --  127  --    AST 34  --   --  28  --    ALT 17  --   --  14  --    ANIONGAP 17*  17*   < > 17* 17*  17* 13   EGFRNONAA 18.1*  18.1*   < > >60.0 >60.0  >60.0 >60.0    < > = values in this interval not displayed.     Microbiology Results (last 7 days)     Procedure Component Value Units Date/Time    Culture, Respiratory [[id number]] Collected:  08/09/19 1622    Order Status:  Sent Specimen:  Respiratory from BAL, RML Updated:  08/09/19 1757    AFB Culture & Smear [[id number]] Collected:  08/09/19 1622    Order Status:  Sent Specimen:  Body Fluid from Lung, RML Updated:  08/09/19 1755    Fungus culture [[id number]] Collected:  08/09/19 1622    Order Status:  Sent Specimen:  Body Fluid from Lung, RML Updated:  08/09/19 1755    Respiratory Viral Panel by PCR [facility]; Sputum (BAL ) [[id number]] Collected:  08/09/19 1628    Order Status:  Sent Specimen:  Bronchial Wash Updated:  08/09/19 1754    Gram stain [[id number]] Collected:  08/09/19 1622    Order Status:  Canceled Specimen:  Body Fluid from Lung, RML     Blood culture [[id number]] Collected:  08/04/19 1402    Order Status:  Completed Specimen:  Blood from Peripheral, Wrist, Right Updated:  08/09/19 1612     Blood Culture, Routine No growth after 5 days.    Blood culture [[id number]] Collected:  08/04/19 1400    Order Status:  Completed Specimen:  Blood from Line, Jugular, Internal Right Updated:  08/09/19 1612     Blood Culture, Routine No growth after 5 days.    Culture, Respiratory with Gram Stain [[id number]]  (Abnormal)  (Susceptibility) Collected:  08/04/19 1505    Order Status:  Completed Specimen:  Respiratory from Endotracheal  Aspirate Updated:  08/09/19 1444     Respiratory Culture PSEUDOMONAS AERUGINOSA  Moderate        STENOTROPHOMONAS (X.) MALTOPHILIA  Moderate        MORAXELLA (BRANHAMELLA) CATARRHALIS  Many  Beta Lactamase positive       Comment: Previous comment was modified by YOSELYN at 14:27 on 08/07/2019  Beta Lactamase positive  Normal respiratory delio also present           STAPHYLOCOCCUS AUREUS  Moderate  Normal respiratory delio also present       Gram Stain (Respiratory) <10 epithelial cells per low power field.     Gram Stain (Respiratory) Many Gram negative diplococci     Gram Stain (Respiratory) Many Gram negative cocci     Gram Stain (Respiratory) Few Gram positive cocci     Gram Stain (Respiratory) Few WBC's          Significant Imaging: I have reviewed all pertinent imaging results/findings within the past 24 hours.

## 2019-08-10 NOTE — PROGRESS NOTES
[facility]  Infectious Disease  Progress Note    Patient Name: Sisi Medel  MRN: [id number]  Admission Date: 8/3/2019  Length of Stay: 7 days  Attending Physician: Bang Cordero MD  Primary Care Provider: Carlos A Caputo MD    Isolation Status: No active isolations  Assessment/Plan:      * Acute on chronic respiratory failure with hypoxia and hypercapnia  58F PMH pulmonary sarcoid on chronic steroids, ESRD on HD presented w/ acute on chronic respiratory failure, now intubated in ICU. ID consulted for management of PNA. ETT culture from 8/4 + MSSA, steno, pseudomonas, and moraxella    Recommendations:  - continue pip-tazo for coverage of MSSA, PSA and moraxella  - continue bactrim for steno coverage  - repeat respiratory cx + klebsiella oxytoca - await sensitivities  - fungitell and PJP PCR from bronch on 8/8 is pending  - please call if patient has any further hemodynamic decompensation    ID will follow        Anticipated Disposition: TBD    Thank you for your consult. I will follow-up with patient. Please contact us if you have any additional questions.      Varsha Lozano DO  Transplant ID  Infectious Disease Fellow  C: [phone]  P: [phone]    Subjective:     Principal Problem:Acute on chronic respiratory failure with hypoxia and hypercapnia    HPI: 58F PMH ESRD on HD, CHF, chronic respiratory failure 2/2 pulmonary sarcoid on chronic prednisone, DM2, Afib who presented from HD w/ worsening respiratory status on 8/3. She is now intubated in ICU. ETT aspirate positive with multiple organisms on 8/4. Patient has been on pip-tazo for several days w/ worsening WBC and continues to require pressors. CXR b/l infiltrates. Culture + moraxella, MSSA, PSA and steno. Patient is unable to provide further history as she remains intubated. She underwent bronch today w/ repeat culture ordered.  Interval History: remains on pressors, extubated to bipap this morning    Review of Systems    Unable to perform ROS: Acuity of condition     Objective:     Vital Signs (Most Recent):  Temp: 99 °F (37.2 °C) (08/10/19 1526)  Pulse: 70 (08/10/19 1526)  Resp: (!) 24 (08/08/19 0541)  BP: 124/66 (08/10/19 1333)  SpO2: 100 % (08/10/19 1526) Vital Signs (24h Range):  Temp:  [98.6 °F (37 °C)-99.3 °F (37.4 °C)] 99 °F (37.2 °C)  Pulse:  [67-84] 70  SpO2:  [77 %-100 %] 100 %  BP: ()/(39-79) 124/66     Weight: 109.8 kg (242 lb)  Body mass index is 37.9 kg/m².    Estimated Creatinine Clearance: 132.1 mL/min (based on SCr of 0.6 mg/dL).    Physical Exam   Constitutional: She is oriented to person, place, and time. She appears well-developed and well-nourished. No distress.   HENT:   Right Ear: External ear normal.   Left Ear: External ear normal.   Nose: Nose normal.   Eyes: Conjunctivae and EOM are normal.   Neck: Normal range of motion. Neck supple.   Cardiovascular: Normal rate, regular rhythm, normal heart sounds and intact distal pulses.   No murmur heard.  Pulmonary/Chest: Effort normal and breath sounds normal. No respiratory distress. She has no wheezes.   intubated   Abdominal: Soft. Bowel sounds are normal. She exhibits no distension. There is no tenderness.   Musculoskeletal: Normal range of motion. She exhibits no edema.   Neurological: She is alert and oriented to person, place, and time. No cranial nerve deficit. Coordination normal.   Skin: Skin is warm and dry. No rash noted. She is not diaphoretic. No erythema.   Psychiatric: She has a normal mood and affect. Her behavior is normal.   Vitals reviewed.      Significant Labs:   CBC:   Recent Labs   Lab 08/09/19  0300 08/10/19  0314   WBC 46.97* 45.07*   HGB 9.1* 9.1*   HCT 31.5* 30.9*   * 106*     CMP:   Recent Labs   Lab 08/09/19  0300 08/09/19  1325 08/09/19  2127 08/10/19  0314   *  135* 135* 135* 138  138   K 4.7  4.7 4.9 4.9 4.8  4.8     100 104 102 102  102   CO2 18*  18* 18* 20* 20*  20*   *  215* 176* 167*  160*  160*   BUN 15  15 10 10 8  8   CREATININE 1.0  1.0 0.8 0.7 0.6  0.6   CALCIUM 8.9  8.9 8.6* 8.9 8.6*  8.6*   PROT 7.4  --   --  7.3   ALBUMIN 2.7*  2.7* 2.8* 2.7* 2.7*  2.7*   BILITOT 1.2*  --   --  1.2*   ALKPHOS 127  --   --  119   AST 28  --   --  29   ALT 14  --   --  14   ANIONGAP 17*  17* 13 13 16  16   EGFRNONAA >60.0  >60.0 >60.0 >60.0 >60.0  >60.0     Microbiology Results (last 7 days)     Procedure Component Value Units Date/Time    AFB Culture & Smear [[id number]] Collected:  08/09/19 1622    Order Status:  Completed Specimen:  Body Fluid from Lung, RML Updated:  08/10/19 1413     AFB CULTURE STAIN No acid fast bacilli seen.    Narrative:       Bronchial Wash    Culture, Respiratory [[id number]]  (Abnormal) Collected:  08/09/19 1622    Order Status:  Completed Specimen:  Respiratory from BAL, RML Updated:  08/10/19 1255     Respiratory Culture KLEBSIELLA OXYTOCA  Moderate  Susceptibility pending  Normal respiratory delio also present       Gram Stain (Respiratory) <10 epithelial cells per low power field.     Gram Stain (Respiratory) Moderate WBC's     Gram Stain (Respiratory) Rare Gram positive cocci     Gram Stain (Respiratory) Rare Gram negative rods    Narrative:       Bronchial Wash    Fungus culture [[id number]]     Order Status:  Completed Specimen:  Respiratory from BAL, RLL     Fungus culture [[id number]] Collected:  08/09/19 1622    Order Status:  Sent Specimen:  Body Fluid from Lung, RML Updated:  08/09/19 1755    Respiratory Viral Panel by PCR [facility]; Sputum (BAL ) [[id number]] Collected:  08/09/19 1628    Order Status:  Sent Specimen:  Bronchial Wash Updated:  08/09/19 1754    Gram stain [[id number]] Collected:  08/09/19 1622    Order Status:  Canceled Specimen:  Body Fluid from Lung, RML     Blood culture [[id number]] Collected:  08/04/19 1402    Order Status:  Completed Specimen:  Blood from Peripheral, Wrist, Right Updated:  08/09/19 1612     Blood Culture, Routine No growth  after 5 days.    Blood culture [[id number]] Collected:  08/04/19 1400    Order Status:  Completed Specimen:  Blood from Line, Jugular, Internal Right Updated:  08/09/19 1612     Blood Culture, Routine No growth after 5 days.    Culture, Respiratory with Gram Stain [[id number]]  (Abnormal)  (Susceptibility) Collected:  08/04/19 1505    Order Status:  Completed Specimen:  Respiratory from Endotracheal Aspirate Updated:  08/09/19 1444     Respiratory Culture PSEUDOMONAS AERUGINOSA  Moderate        STENOTROPHOMONAS (X.) MALTOPHILIA  Moderate        MORAXELLA (BRANHAMELLA) CATARRHALIS  Many  Beta Lactamase positive       Comment: Previous comment was modified by YOSELYN at 14:27 on 08/07/2019  Beta Lactamase positive  Normal respiratory delio also present           STAPHYLOCOCCUS AUREUS  Moderate  Normal respiratory delio also present       Gram Stain (Respiratory) <10 epithelial cells per low power field.     Gram Stain (Respiratory) Many Gram negative diplococci     Gram Stain (Respiratory) Many Gram negative cocci     Gram Stain (Respiratory) Few Gram positive cocci     Gram Stain (Respiratory) Few WBC's          Significant Imaging: I have reviewed all pertinent imaging results/findings within the past 24 hours.

## 2019-08-10 NOTE — NURSING
Precedex decreased to from 0.8 mg/hr to 0.2 mg/hr by Dr. Cordero (Staff MD). Wean to extubate with MD and RT at the bedside. Repositioned in bed by critical care team to pulmonary position. OK to extubate with MD remaining at the bedside with assessment. Pre-extubation VS at HR 89 98/54 RR 43 sats 99%, drowsy. Follows simple one step commands with loud voice, stimulation. MD states that patient does not need any ABG prior to extubation. Verbalized understanding. Tube feedings placed on hold from 40 cc/h as per MD instruction. Safety and comfort measures ongoing. CRRT ongoing. Will continue to monitor.

## 2019-08-10 NOTE — ASSESSMENT & PLAN NOTE
Patient has hx of HF (EF 35%). CXR in the ED suggestive of HF. BNP 1424 and troponins 0.156.  Patient is unsure if she takes her lisinopril at home.  - repeat TTE EF 30%, LVH, G3DD, LAE/JAGUAR, moderate RV systolic dysfunction   - continue CRRT for volume removal   - stable pressor requirements

## 2019-08-11 NOTE — ASSESSMENT & PLAN NOTE
Course lung sounds and CXR concerning for PNA in LLL with interstitial fullness with recent rising WBC count. No fevers overnight. Tube feeds stopped to reduce risk of aspiration.    - zosyn for MSSA, PSA and moraxella   - continue bactrim for PJP prophylaxis w/ steroid use   - f/u on BCx   - respiratory cx positive for klebsiella oxytoca - pending sensitivities   - f/u fungitell and PJP PCR from bronch on 8/8    - trend WBC   - consulted ID, appreciate recs   - continue levo/vaso as needed for MAP >65

## 2019-08-11 NOTE — ASSESSMENT & PLAN NOTE
Attempted to discuss goals of care with family on 8/10. Patient's adult children would not like to have this discussion at this time. Patient appears comfortable on vent.

## 2019-08-11 NOTE — ASSESSMENT & PLAN NOTE
On 10 mg midodrine TID at home. Currently on levophed 0.1 and vaso 0.4 based on BP cuff reads. Off propofol, only precedex for sedation, but with PVCs, transitioned to fentanyl drip instead.  - Continue midodrine  - levophed/vaso - titrate to map 65  - fentanyl ggt

## 2019-08-11 NOTE — PROGRESS NOTES
[facility]  Infectious Disease  Progress Note    Patient Name: Sisi Medel  MRN: [id number]  Admission Date: 8/3/2019  Length of Stay: 8 days  Attending Physician: Bang Cordero MD  Primary Care Provider: Carlos A Caputo MD    Isolation Status: No active isolations  Assessment/Plan:      * Acute on chronic respiratory failure with hypoxia and hypercapnia  58F PMH pulmonary sarcoid on chronic steroids, ESRD on HD presented w/ acute on chronic respiratory failure, now intubated in ICU. ID consulted for management of PNA. ETT culture from 8/4 + MSSA, steno, pseudomonas, and moraxella    Recommendations:  - continue meropenem for coverage of MSSA, PSA and moraxella, and now ESBL kleb oxytoca  - continue bactrim for steno coverage  - fungitell and PJP PCR from bronch on 8/8 is pending    ID will follow        Anticipated Disposition: TBD    Thank you for your consult. I will follow-up with patient. Please contact us if you have any additional questions.    Priti Lozano,   Infectious Disease  [facility]    Subjective:     Principal Problem:Acute on chronic respiratory failure with hypoxia and hypercapnia    HPI: 58F PMH ESRD on HD, CHF, chronic respiratory failure 2/2 pulmonary sarcoid on chronic prednisone, DM2, Afib who presented from HD w/ worsening respiratory status on 8/3. She is now intubated in ICU. ETT aspirate positive with multiple organisms on 8/4. Patient has been on pip-tazo for several days w/ worsening WBC and continues to require pressors. CXR b/l infiltrates. Culture + moraxella, MSSA, PSA and steno. Patient is unable to provide further history as she remains intubated. She underwent bronch today w/ repeat culture ordered.  Interval History: reintubated last night, abx changed to meropenem, CXR w/ increased R sided infiltrate. On increasing doses of vaso and levo.    Review of Systems   Unable to perform ROS: Intubated     Objective:     Vital  Signs (Most Recent):  Temp: 99.8 °F (37.7 °C) (08/11/19 1100)  Pulse: 71 (08/11/19 1300)  Resp: (!) 24 (08/08/19 0541)  BP: 103/66 (08/11/19 1300)  SpO2: 97 % (08/11/19 1300) Vital Signs (24h Range):  Temp:  [97.6 °F (36.4 °C)-99.8 °F (37.7 °C)] 99.8 °F (37.7 °C)  Pulse:  [66-75] 71  SpO2:  [90 %-100 %] 97 %  BP: ()/(42-75) 103/66  Arterial Line BP: ()/(48-74) 97/57     Weight: 109.8 kg (242 lb)  Body mass index is 36.8 kg/m².    Estimated Creatinine Clearance: 67.2 mL/min (based on SCr of 1.2 mg/dL).    Physical Exam   Constitutional: She is oriented to person, place, and time. She appears well-developed and well-nourished. No distress.   HENT:   Right Ear: External ear normal.   Left Ear: External ear normal.   Nose: Nose normal.   Eyes: Conjunctivae and EOM are normal.   Neck: Normal range of motion. Neck supple.   Cardiovascular: Normal rate, regular rhythm, normal heart sounds and intact distal pulses.   No murmur heard.  Pulmonary/Chest: Effort normal and breath sounds normal. No respiratory distress. She has no wheezes.   intubated   Abdominal: Soft. Bowel sounds are normal. She exhibits no distension. There is no tenderness.   Musculoskeletal: Normal range of motion. She exhibits no edema.   Neurological: She is alert and oriented to person, place, and time. No cranial nerve deficit. Coordination normal.   Skin: Skin is warm and dry. No rash noted. She is not diaphoretic. No erythema.   Psychiatric: She has a normal mood and affect. Her behavior is normal.   Vitals reviewed.      Significant Labs:   CBC:   Recent Labs   Lab 08/10/19  0314 08/10/19  1954 08/11/19  0340   WBC 45.07* 49.07* 44.56*   HGB 9.1* 9.7* 9.0*   HCT 30.9* 34.9* 31.7*   * 117* 113*     CMP:   Recent Labs   Lab 08/10/19  0314  08/10/19  1956 08/10/19  2306 08/11/19  0340     138   < > 138 138 137  137   K 4.8  4.8   < > 4.7 4.5 4.7  4.7     102   < > 103 105 104  104   CO2 20*  20*   < > 18* 18* 18*   18*   *  160*   < > 179* 245* 267*  267*   BUN 8  8   < > 10 14 18  18   CREATININE 0.6  0.6   < > 0.8 0.9 1.2  1.2   CALCIUM 8.6*  8.6*   < > 9.0 8.5* 8.8  8.8   PROT 7.3  --  7.5  --  6.8   ALBUMIN 2.7*  2.7*   < > 2.8* 2.5* 2.6*  2.6*   BILITOT 1.2*  --  1.5*  --  1.3*   ALKPHOS 119  --  127  --  113   AST 29  --  33  --  33   ALT 14  --  16  --  15   ANIONGAP 16  16   < > 17* 15 15  15   EGFRNONAA >60.0  >60.0   < > >60.0 >60.0 50.3*  50.3*    < > = values in this interval not displayed.     Microbiology Results (last 7 days)     Procedure Component Value Units Date/Time    Culture, Respiratory [[id number]]  (Abnormal)  (Susceptibility) Collected:  08/09/19 1622    Order Status:  Completed Specimen:  Respiratory from BAL, RML Updated:  08/11/19 1307     Respiratory Culture KLEBSIELLA OXYTOCA ESBL  Moderate  Normal respiratory delio also present       Gram Stain (Respiratory) <10 epithelial cells per low power field.     Gram Stain (Respiratory) Moderate WBC's     Gram Stain (Respiratory) Rare Gram positive cocci     Gram Stain (Respiratory) Rare Gram negative rods    Narrative:       Bronchial Wash    Blood culture [[id number]] Collected:  08/10/19 2130    Order Status:  Completed Specimen:  Blood from Peripheral, Antecubital, Right Updated:  08/11/19 0515     Blood Culture, Routine No Growth to date    Blood culture [[id number]] Collected:  08/10/19 2043    Order Status:  Completed Specimen:  Blood from Line, Arterial, Right Updated:  08/11/19 0345     Blood Culture, Routine No Growth to date    Culture, Respiratory with Gram Stain [[id number]] Collected:  08/10/19 2144    Order Status:  Completed Specimen:  Respiratory from Endotracheal Aspirate Updated:  08/11/19 0246     Gram Stain (Respiratory) No WBC's     Gram Stain (Respiratory) No organisms seen    AFB Culture & Smear [[id number]] Collected:  08/09/19 1622    Order Status:  Completed Specimen:  Body Fluid from Lung, RML Updated:   08/10/19 2127     AFB Culture & Smear Culture in progress     AFB CULTURE STAIN No acid fast bacilli seen.    Narrative:       Bronchial Wash    Fungus culture [[id number]] Collected:  08/09/19 1622    Order Status:  No result Specimen:  Bronchial Alveolar Lavage Updated:  08/10/19 1752    Fungus culture [[id number]]     Order Status:  Completed Specimen:  Respiratory from BAL, RLL     Fungus culture [[id number]] Collected:  08/09/19 1622    Order Status:  Sent Specimen:  Body Fluid from Lung, RML Updated:  08/09/19 1755    Respiratory Viral Panel by PCR [facility]; Sputum (BAL ) [[id number]] Collected:  08/09/19 1628    Order Status:  Sent Specimen:  Bronchial Wash Updated:  08/09/19 1754    Gram stain [[id number]] Collected:  08/09/19 1622    Order Status:  Canceled Specimen:  Body Fluid from Lung, RML     Blood culture [[id number]] Collected:  08/04/19 1402    Order Status:  Completed Specimen:  Blood from Peripheral, Wrist, Right Updated:  08/09/19 1612     Blood Culture, Routine No growth after 5 days.    Blood culture [[id number]] Collected:  08/04/19 1400    Order Status:  Completed Specimen:  Blood from Line, Jugular, Internal Right Updated:  08/09/19 1612     Blood Culture, Routine No growth after 5 days.    Culture, Respiratory with Gram Stain [[id number]]  (Abnormal)  (Susceptibility) Collected:  08/04/19 1505    Order Status:  Completed Specimen:  Respiratory from Endotracheal Aspirate Updated:  08/09/19 1444     Respiratory Culture PSEUDOMONAS AERUGINOSA  Moderate        STENOTROPHOMONAS (X.) MALTOPHILIA  Moderate        MORAXELLA (BRANHAMELLA) CATARRHALIS  Many  Beta Lactamase positive       Comment: Previous comment was modified by [email or web address] at 14:27 on 08/07/2019  Beta Lactamase positive  Normal respiratory delio also present           STAPHYLOCOCCUS AUREUS  Moderate  Normal respiratory delio also present       Gram Stain (Respiratory) <10 epithelial cells per low power field.     Gram Stain (Respiratory) Many Gram  negative diplococci     Gram Stain (Respiratory) Many Gram negative cocci     Gram Stain (Respiratory) Few Gram positive cocci     Gram Stain (Respiratory) Few WBC's          Significant Imaging: I have reviewed all pertinent imaging results/findings within the past 24 hours.

## 2019-08-11 NOTE — ASSESSMENT & PLAN NOTE
Patient has hx of HF (EF 35%). CXR in the ED suggestive of HF. BNP 1424 and troponins 0.156.  Patient is unsure if she takes her lisinopril at home.  - repeat TTE EF 30%, LVH, G3DD, LAE/JAGUAR, moderate RV systolic dysfunction   - continue CRRT with plan for net neutral I/O today  - increasing pressor requirements

## 2019-08-11 NOTE — ASSESSMENT & PLAN NOTE
- continue CRRT for goal of net neutral I/O, CXR improving, vasopressor stable  - appreciate nephro assistance

## 2019-08-11 NOTE — PROGRESS NOTES
[facility]  Critical Care Medicine  Progress Note    Patient Name: Sisi Medel  MRN: [id number]  Admission Date: 8/3/2019  Hospital Length of Stay: 7 days  Code Status: Full Code  Attending Provider: Bang Cordero MD  Primary Care Provider: Carlos A Caputo MD   Principal Problem: Acute on chronic respiratory failure with hypoxia and hypercapnia    Subjective:     HPI:  Ms Medel  59 yo f with ESRD on HD, cervical radiculopathy, CHF, Chronic resp failure with hypoxia, T2DM, DDD, paroxysmal Afib, seizure, shingles, thyroid disorder here for a 3 day history of cough and SOB. Patient was at dialysis and had to stop at 1.6L due to resp distress. She was put in a nonrebreather at 12L of oxygen. She was transported via EMS. Overnight she required BiPAP for worsening SOB. Rapid response was called this morning because she was hypotensive 70s/50s. Dr. Rosas, MICU team, went and recheck BP was 107/59. She later became hypertensive again and continued to be on BiPAP, she was stepped up for dialysis, BP monitoring, possible vasopressor requirement and further respiratory monitoring.     During step up she was persistently hypoglycemic and hypotensive, peripheral vascular access was lost and a left TLC was placed at bedside. It was determined she could not tolerated her normal HD and she had tenuous respiratory status, so decision was made to intubate. She has been started on broad spectrum abx, and right HD IJ catheter was placed. Nephro been made aware and SCUF planned for today.     Hospital/ICU Course:  08/04 Stepped up to ICU, intubated, started on broad spectrum abx, HD catheter placed, plan is for SCUF today.   08/05 SCUF started in the AM, levo requirement of 0.06. Still intubated. Plan to transition to SLED. Blood cultures negative to date, covered with Vanc and Zosyn   08/06 On SLED today, had increase of pressor requirement was at 0.13 levo and .04 vaso, increase white count 13.3  and temp overnight 100.4. Sputum culture grew pseudomonas, d/c vancomycin, plan is to transition to SCUF.   08/07 Patient continued on levophed and vasopressin, with decreasing levophed requirements. Continued on zosyn for pseudomonas growing in respiratory cultures. SAT attempted this morning with significant tachypnea and agitation. Propofol reinitiated at prior rate, fentanyl at 75. Patient has corneal reflexes, spontaneously opens eyes but mental status is not at baseline - EEG and CTH ordered. CTH unremarkble, EEG performed but not yet read. Mental status improving, leukocytosis worsening but no evidence of infection. Likely related to stress dose steroids.   08/09 EEG result c/w encephalopathy; passed SAT; SBT: RSBI 88, however hypotensive with SBT and tachypneic to 35   08/10 holding tube feeds, SLED, on zosyn and bactrim for PNA and long term steroid use, and attempted extubation today. Re-intubated for increased WOB and high RR          Interval History/Significant Events: Passed SAT and high RR on SBT. Plan for extubation to see how patient does. Bronchoscopy results pending. WBC stable and afebrile overnight.     Denies any pain, responding by head movement.    Review of Systems   Unable to perform ROS: Intubated   Constitutional: Negative for diaphoresis.   Cardiovascular: Negative for chest pain.   Gastrointestinal: Negative for abdominal pain.       Objective:     Vital Signs (Most Recent):  Temp: 97.6 °F (36.4 °C) (08/10/19 1900)  Pulse: 66 (08/10/19 1930)  Resp: (!) 24 (08/08/19 0541)  BP: (!) 76/50 (08/10/19 1930)  SpO2: 96 % (08/10/19 1930) Vital Signs (24h Range):  Temp:  [97.6 °F (36.4 °C)-99 °F (37.2 °C)] 97.6 °F (36.4 °C)  Pulse:  [66-84] 66  SpO2:  [77 %-100 %] 96 %  BP: ()/(39-79) 76/50   Weight: 109.8 kg (242 lb)  Body mass index is 37.9 kg/m².      Intake/Output Summary (Last 24 hours) at 8/10/2019 2000  Last data filed at 8/10/2019 1900  Gross per 24 hour   Intake 5947.89 ml   Output  8286 ml   Net -2338.11 ml       Physical Exam   Constitutional: She appears ill. She is sedated, intubated and restrained.   Morbidly obese AA female, intubated and sedated, currently on hemodialysis    HENT:   Head: Normocephalic and atraumatic.   Eyes: Pupils are equal, round, and reactive to light. Conjunctivae and EOM are normal.   Vertical gaze preference   Neck: Normal range of motion. Neck supple.   Cardiovascular: Normal rate, regular rhythm and normal heart sounds.   Pulses:       Radial pulses are 2+ on the right side, and 2+ on the left side.   Pulmonary/Chest: Effort normal. No stridor. She is intubated. No respiratory distress. She has no wheezes. She has rales.   Abdominal: Soft. Bowel sounds are normal.   Musculoskeletal: Normal range of motion. She exhibits edema. She exhibits no tenderness or deformity.   Neurological: She is alert.   Vertical gaze preference  Able to follow some commands   Skin: Skin is warm and dry.   Psychiatric:   MAGNUS, Pt intubated/sedated       Vents:  Vent Mode: A/C (08/10/19 1907)  Ventilator Initiated: Yes (08/10/19 1742)  Set Rate: 16 bmp (08/10/19 1907)  Vt Set: 400 mL (08/10/19 1907)  Pressure Support: 10 cmH20 (08/10/19 1230)  PEEP/CPAP: 5 cmH20 (08/10/19 1907)  Oxygen Concentration (%): 68 (08/10/19 1930)  Peak Airway Pressure: 32 cmH2O (08/10/19 1907)  Plateau Pressure: 0 cmH20 (08/10/19 1907)  Total Ve: 10.5 mL (08/10/19 1907)  F/VT Ratio<105 (RSBI): (!) 55.43 (08/08/19 0541)  Lines/Drains/Airways     Central Venous Catheter Line                 Percutaneous Central Line Insertion/Assessment - triple lumen  08/04/19 1803 left internal jugular 6 days         Trialysis (Dialysis) Catheter 08/04/19 right internal jugular 6 days          Drain                 Hemodialysis AV Graft Left upper arm -- days         NG/OG Tube 08/04/19 1230 6 days          Airway                 Airway - Non-Surgical 08/04/19 1139 Endotracheal Tube 6 days          Arterial Line                  Arterial Line 5 days              Significant Labs:    CBC/Anemia Profile:  Recent Labs   Lab 08/09/19  0300 08/10/19  0314   WBC 46.97* 45.07*   HGB 9.1* 9.1*   HCT 31.5* 30.9*   * 106*   MCV 87 87   RDW 21.6* 22.2*        Chemistries:  Recent Labs   Lab 08/09/19  0300  08/09/19  2127 08/10/19  0314 08/10/19  1400   *  135*   < > 135* 138  138 138   K 4.7  4.7   < > 4.9 4.8  4.8 4.7     100   < > 102 102  102 101   CO2 18*  18*   < > 20* 20*  20* 20*   BUN 15  15   < > 10 8  8 8   CREATININE 1.0  1.0   < > 0.7 0.6  0.6 0.6   CALCIUM 8.9  8.9   < > 8.9 8.6*  8.6* 8.6*   ALBUMIN 2.7*  2.7*   < > 2.7* 2.7*  2.7* 2.8*   PROT 7.4  --   --  7.3  --    BILITOT 1.2*  --   --  1.2*  --    ALKPHOS 127  --   --  119  --    ALT 14  --   --  14  --    AST 28  --   --  29  --    MG 2.3   < > 2.1 2.0 2.0   PHOS 2.6*  2.6*   < > 2.5* 1.9*  1.9* 3.1  3.1    < > = values in this interval not displayed.       All pertinent labs within the past 24 hours have been reviewed.    Significant Imaging:  I have reviewed all pertinent imaging results/findings within the past 24 hours.  Objective:     Vital Signs (Most Recent):  Temp: 98.8 °F (37.1 °C) (08/10/19 0800)  Pulse: 71 (08/10/19 0800)  Resp: (!) 24 (08/08/19 0541)  BP: (!) 101/57 (08/10/19 0800)  SpO2: 97 % (08/10/19 0800) Vital Signs (24h Range):  Temp:  [98.4 °F (36.9 °C)-99.3 °F (37.4 °C)] 98.8 °F (37.1 °C)  Pulse:  [67-81] 71  SpO2:  [91 %-100 %] 97 %  BP: ()/(39-79) 101/57   Weight: 109.8 kg (242 lb)  Body mass index is 37.9 kg/m².      Intake/Output Summary (Last 24 hours) at 8/10/2019 0829  Last data filed at 8/10/2019 0800  Gross per 24 hour   Intake 7176.89 ml   Output 7625 ml   Net -448.11 ml       Physical Exam   Constitutional: She appears ill. She is sedated, intubated and restrained.   Morbidly obese AA female, intubated and sedated, currently on hemodialysis. Able to follow basic commands with extremities and move head. Mild  chemosis    HENT:   Head: Normocephalic and atraumatic.   Right Ear: External ear normal.   Left Ear: External ear normal.   Eyes: Pupils are equal, round, and reactive to light. Conjunctivae and EOM are normal. Right eye exhibits no discharge. Left eye exhibits no discharge.   mild chemosis.   Neck: Normal range of motion. Neck supple.   Cardiovascular: Normal rate, regular rhythm and normal heart sounds.   Pulses:       Radial pulses are 2+ on the right side, and 2+ on the left side.   Pulmonary/Chest: Effort normal. No stridor. She is intubated. No respiratory distress. She has no wheezes. She has rales.   Abdominal: Soft. Bowel sounds are normal. There is no tenderness.   Musculoskeletal: Normal range of motion. She exhibits edema. She exhibits no tenderness or deformity.   Neurological: She is alert.   Vertical gaze preference  Able to follow some commands, 5/5 strength in bilateral hands   Skin: Skin is warm and dry. No erythema.   Psychiatric:   MAGNUS, Pt intubated on precedex       Vents:  Vent Mode: Spont (08/10/19 0745)  Ventilator Initiated: Yes (08/04/19 1139)  Set Rate: 16 bmp (08/10/19 0745)  Vt Set: 400 mL (08/10/19 0745)  Pressure Support: 10 cmH20 (08/10/19 0745)  PEEP/CPAP: 5 cmH20 (08/10/19 0745)  Oxygen Concentration (%): 34 (08/10/19 0800)  Peak Airway Pressure: 15 cmH2O (08/10/19 0745)  Plateau Pressure: 26 cmH20 (08/10/19 0745)  Total Ve: 10.3 mL (08/10/19 0745)  F/VT Ratio<105 (RSBI): (!) 55.43 (08/08/19 0541)  Lines/Drains/Airways     Central Venous Catheter Line                 Trialysis (Dialysis) Catheter 08/04/19 right internal jugular 6 days         Percutaneous Central Line Insertion/Assessment - triple lumen  08/04/19 1803 left internal jugular 5 days          Drain                 Hemodialysis AV Graft Left upper arm -- days         NG/OG Tube 08/04/19 1230 5 days          Airway                 Airway - Non-Surgical 08/04/19 1139 Endotracheal Tube 5 days          Arterial Line                  Arterial Line 5 days              Significant Labs:    CBC/Anemia Profile:  Recent Labs   Lab 08/09/19  0300 08/10/19  0314   WBC 46.97* 45.07*   HGB 9.1* 9.1*   HCT 31.5* 30.9*   * 106*   MCV 87 87   RDW 21.6* 22.2*        Chemistries:  Recent Labs   Lab 08/09/19  0300 08/09/19  1325 08/09/19  2127 08/10/19  0314   *  135* 135* 135* 138  138   K 4.7  4.7 4.9 4.9 4.8  4.8     100 104 102 102  102   CO2 18*  18* 18* 20* 20*  20*   BUN 15  15 10 10 8  8   CREATININE 1.0  1.0 0.8 0.7 0.6  0.6   CALCIUM 8.9  8.9 8.6* 8.9 8.6*  8.6*   ALBUMIN 2.7*  2.7* 2.8* 2.7* 2.7*  2.7*   PROT 7.4  --   --  7.3   BILITOT 1.2*  --   --  1.2*   ALKPHOS 127  --   --  119   ALT 14  --   --  14   AST 28  --   --  29   MG 2.3 2.0 2.1 2.0   PHOS 2.6*  2.6* 2.9 2.5* 1.9*  1.9*       All pertinent labs within the past 24 hours have been reviewed.    Significant Imaging:  I have reviewed all pertinent imaging results/findings within the past 24 hours.      ABG  Recent Labs   Lab 08/10/19  1909   PH 7.336*   PO2 276*   PCO2 42.8   HCO3 22.9*   BE -3     Assessment/Plan:     Neuro  Encephalopathy  Mental status questionable despite weaning sedations  - change sedation to propofol and precedex  - SAT 8/8 AM  - CT H and EEG unremarkable    Epilepsy  Continue keppra. EEG showed encephalopathy.     Pulmonary  * Acute on chronic respiratory failure with hypoxia and hypercapnia  Patient presents with A fib in the ED. Patient is on chronic warfarin. S/P ablation multiple times.  - coarse breath sounds L>R, remains intubated, attempted extubated, but re-intubated for increased WOB and expected clinical course  -CXR improving  - net negative 1.9L today  - continue on zosyn for pseudomonas in sputum culture  - continue bactrim for PJP prophylaxis  - Consult ID for antibiotic assistance  - continue CRRT for volume removal         Pneumonia  Course lung sounds and CXR concerning for PNA in LLL with interstitial  fullness with recent rising WBC count. No fevers overnight. Tube feeds stopped to reduce risk of aspiration.    - zosyn for MSSA, PSA and moraxella   - continue bactrim for PJP prophylaxis w/ steroid use   - f/u on BCx   - respiratory cx positive for klebsiella oxytoca - pending sensitivities   - f/u fungitell and PJP PCR from bronch on 8/8    - trend WBC   - consulted ID, appreciate recs   - continue levo/vaso as needed for MAP >65    Pulmonary hypertension  Likely Group 2 pHTN related to HFrEF. Continue on dialysis     TTE (8/6/2019): PASP of 50        Cardiac/Vascular  Permanent atrial fibrillation    Patient presents with A fib in the ED. Patient is on chronic warfarin. S/P ablation multiple times  Plan  -Continue heparin gtt  -Pacemaker in place       Acute on chronic systolic congestive heart failure  Patient has hx of HF (EF 35%). CXR in the ED suggestive of HF. BNP 1424 and troponins 0.156.  Patient is unsure if she takes her lisinopril at home.  - repeat TTE EF 30%, LVH, G3DD, LAE/JAGUAR, moderate RV systolic dysfunction   - continue CRRT for volume removal   - stable pressor requirements     Hypotension  On 10 mg midodrine TID at home. Currently on levophed 0.1 and vaso 0.4 based on BP cuff reads. Off propofol, only precedex for sedation  - Continue midodrine  - levophed/vaso - titrate to map 65      Renal/  ESRD on hemodialysis  - continue CRRT for volume removal, CXR improving, vasopressor requirements improving  - appreciate nephro assistance      Immunology/Multi System  Pulmonary sarcoidosis  Stress dose steroids while in septic shock. Started on bactrim per ID with chronic steroid use    Hematology  Current use of long term anticoagulation  - heparin gtt while IP  - no evidence of hepatic dysfunction on labs - minor bili bump      Other  Class 2 severe obesity due to excess calories with serious comorbidity and body mass index (BMI) of 38.0 to 38.9 in adult  Will need to revisit weight loss when  improving  Significant pannus    Current chronic use of systemic steroids  - no evidence of PJP ppx on MAR  - on stress dose during septic shock   - will need PJP PPX if remaining on prolonged steroids for sarcoid        Critical Care Daily Checklist:    A: Awake: RASS Goal/Actual Goal: RASS Goal: -1-->drowsy  Actual: Horton Agitation Sedation Scale (RASS): Light sedation   B: Spontaneous Breathing Trial Performed? Spon. Breathing Trial Initiated?: Initiated (08/10/19 0800)   C: SAT & SBT Coordinated?  Yes, passed SAT, increased RR w/ SBT                      D: Delirium: CAM-ICU Overall CAM-ICU: Negative   E: Early Mobility Performed? n/a   F: Feeding Goal: Goals: Patient to meet > 85% EEN and EPN by RD follow-up  Status: Nutrition Goal Status: goal not met   Current Diet Order   Procedures    Diet NPO Except for: Medication     Order Specific Question:   Except for     Answer:   Medication      AS: Analgesia/Sedation precedex   T: Thromboembolic Prophylaxis Heparin ggt   H: HOB > 300 Yes   U: Stress Ulcer Prophylaxis (if needed) pantroprazole   G: Glucose Control n/a   B: Bowel Function     I: Indwelling Catheter (Lines & Ace) Necessity ace   D: De-escalation of Antimicrobials/Pharmacotherapies As indicated, ID following    Plan for the day/ETD Attempted extubation, continue PNA tx and f/u on cultures    Code Status:  Family/Goals of Care: Full Code         Critical secondary to Patient has a condition that poses threat to life and bodily function: Severe Respiratory Distress      Critical care was time spent personally by me on the following activities: development of treatment plan with patient or surrogate and bedside caregivers, discussions with consultants, evaluation of patient's response to treatment, examination of patient, ordering and performing treatments and interventions, ordering and review of laboratory studies, ordering and review of radiographic studies, pulse oximetry, re-evaluation of  patient's condition. This critical care time did not overlap with that of any other provider or involve time for any procedures.     Jaja Weeks MD  Critical Care Medicine  [facility]

## 2019-08-11 NOTE — ASSESSMENT & PLAN NOTE
Patient presents with A fib in the ED. Patient is on chronic warfarin. S/P ablation multiple times.  - coarse breath sounds L>R, remains intubated, attempted extubated, but re-intubated for increased WOB and expected clinical course  -CXR improving  - net negative 1.9L today  - continue on zosyn for pseudomonas in sputum culture  - continue bactrim for PJP prophylaxis  - Consult ID for antibiotic assistance  - continue CRRT for volume removal

## 2019-08-11 NOTE — PROGRESS NOTES
Pt had episode of hypotension with increased pressor requirements. Critical care team notified and to the bedside. Orders for CXR, labs, cultures, noted. Cranbury placed and pt tolerated well. BRIE.

## 2019-08-11 NOTE — SUBJECTIVE & OBJECTIVE
Interval History/Significant Events: Responding by head movement. WBC stable and afebrile overnight. Respiratory cultures grew Klebsiella on 8/9    Denies any pain, continues to respond by head movement.      Objective:     Vital Signs (Most Recent):  Temp: 99.8 °F (37.7 °C) (08/11/19 1100)  Pulse: (!) 135 (08/11/19 1659)  Resp: (!) 24 (08/08/19 0541)  BP: 103/66 (08/11/19 1300)  SpO2: 100 % (08/11/19 1659) Vital Signs (24h Range):  Temp:  [97.6 °F (36.4 °C)-99.8 °F (37.7 °C)] 99.8 °F (37.7 °C)  Pulse:  [] 135  SpO2:  [90 %-100 %] 100 %  BP: ()/(42-75) 103/66  Arterial Line BP: ()/(48-74) 97/57   Weight: 109.8 kg (242 lb)  Body mass index is 36.8 kg/m².      Intake/Output Summary (Last 24 hours) at 8/11/2019 1708  Last data filed at 8/11/2019 1300  Gross per 24 hour   Intake 3503.5 ml   Output 847 ml   Net 2656.5 ml       Physical Exam   Constitutional: She appears ill. She is sedated, intubated and restrained.   Morbidly obese AA female, intubated and sedated, currently off dialysis. Able to follow basic commands with extremities and move head. Mild chemosis    HENT:   Head: Normocephalic and atraumatic.   Right Ear: External ear normal.   Left Ear: External ear normal.   Eyes: Pupils are equal, round, and reactive to light. Conjunctivae and EOM are normal. Right eye exhibits no discharge. Left eye exhibits no discharge.   Mild chemosis   Neck: Normal range of motion. Neck supple.   Cardiovascular: Normal rate, regular rhythm and normal heart sounds.   Pulses:       Radial pulses are 2+ on the right side, and 2+ on the left side.   Pulmonary/Chest: Effort normal. No stridor. She is intubated. No respiratory distress. She has no wheezes. She has rales.   Abdominal: Soft. Bowel sounds are normal. There is no tenderness.   Musculoskeletal: Normal range of motion. She exhibits edema. She exhibits no tenderness or deformity.   Neurological: She is alert.   Able to follow some commands, 5/5 strength in  bilateral hands   Skin: Skin is warm and dry. No erythema.   Psychiatric:   MAGNUS, Pt intubated on precedex       Vents:  Vent Mode: A/C (08/11/19 1659)  Ventilator Initiated: Yes (08/10/19 1742)  Set Rate: 16 bmp (08/11/19 1659)  Vt Set: 400 mL (08/11/19 1659)  Pressure Support: 10 cmH20 (08/10/19 1230)  PEEP/CPAP: 5 cmH20 (08/11/19 1659)  Oxygen Concentration (%): 40 (08/11/19 1659)  Peak Airway Pressure: 27 cmH2O (08/11/19 1659)  Plateau Pressure: 30 cmH20 (08/11/19 1659)  Total Ve: 9.29 mL (08/11/19 1659)  F/VT Ratio<105 (RSBI): (!) 55.43 (08/08/19 0541)  Lines/Drains/Airways     Central Venous Catheter Line                 Trialysis (Dialysis) Catheter 08/04/19 right internal jugular 7 days         Percutaneous Central Line Insertion/Assessment - triple lumen  08/04/19 1803 left internal jugular 6 days          Drain                 Hemodialysis AV Graft Left upper arm -- days         NG/OG Tube 08/04/19 1230 7 days          Airway                 Airway - Non-Surgical 08/10/19 1756 Endotracheal Tube less than 1 day          Arterial Line                 Arterial Line 6 days              Significant Labs:    CBC/Anemia Profile:  Recent Labs   Lab 08/10/19  0314 08/10/19  1954 08/11/19  0340   WBC 45.07* 49.07* 44.56*   HGB 9.1* 9.7* 9.0*   HCT 30.9* 34.9* 31.7*   * 117* 113*   MCV 87 91 90   RDW 22.2* 23.0* 23.3*        Chemistries:  Recent Labs   Lab 08/10/19  0314  08/10/19  1956 08/10/19  2306 08/11/19  0340     138   < > 138 138 137  137   K 4.8  4.8   < > 4.7 4.5 4.7  4.7     102   < > 103 105 104  104   CO2 20*  20*   < > 18* 18* 18*  18*   BUN 8  8   < > 10 14 18  18   CREATININE 0.6  0.6   < > 0.8 0.9 1.2  1.2   CALCIUM 8.6*  8.6*   < > 9.0 8.5* 8.8  8.8   ALBUMIN 2.7*  2.7*   < > 2.8* 2.5* 2.6*  2.6*   PROT 7.3  --  7.5  --  6.8   BILITOT 1.2*  --  1.5*  --  1.3*   ALKPHOS 119  --  127  --  113   ALT 14  --  16  --  15   AST 29  --  33  --  33   MG 2.0   < > 2.1 1.9 1.9    PHOS 1.9*  1.9*   < > 3.7 4.1 4.8*  4.8*    < > = values in this interval not displayed.       All pertinent labs within the past 24 hours have been reviewed.    Significant Imaging:  I have reviewed all pertinent imaging results/findings within the past 24 hours.

## 2019-08-11 NOTE — ASSESSMENT & PLAN NOTE
Patient presents with A fib in the ED. Patient is on chronic warfarin. S/P ablation multiple times. PVCs overnight and transitions to fentanyl from precedex with improvement. troponins stable.   Plan  -Continue heparin gtt  -Pacemaker in place

## 2019-08-11 NOTE — ASSESSMENT & PLAN NOTE
Hx of sarcoidosis, CHF, ESRD, and recent PNA.  -Coarse breath sounds L>R, remains intubated, attempted extubated on 8/10, but re-intubated for increased WOB, tachypneia and expected clinical course  - CXR improving  - net negative 1.9L today  - continue on meropenem for klebsiella infection  - continue bactrim for PJP prophylaxis  - Consult ID for antibiotic assistance  - continue CRRT for net negative volume loss

## 2019-08-11 NOTE — PROGRESS NOTES
CRRT rinsed back by bedside RN due to low blood pressure. Came to room to restart, BP still running low, SBP at 80's with MAP of 65. Last CVP is 5. I&O (-) 1900ml. Spoke to Dr. Thompson, she ordered to hold treatment tonight.

## 2019-08-11 NOTE — PROCEDURES
"Sisi Medel is a 58 y.o. female patient.    Temp: 97.6 °F (36.4 °C) (08/10/19 1900)  Pulse: 66 (08/10/19 1930)  Resp: (!) 24 (19 05)  BP: (!) 76/50 (08/10/19 1930)  SpO2: 96 % (08/10/19 1930)  Weight: 109.8 kg (242 lb) (19)  Height: 5' 7" (170.2 cm) (19)       Arterial Line  Date/Time: 8/10/2019 8:13 PM  Location procedure was performed: [facility] SURGICAL ICU (SICU)  Performed by: Yolanda De Los Santos NP  Authorized by: Yolanda De Los Santos NP   Pre-op Diagnosis: septic shock  Post-operative diagnosis: septic shock  Consent Done: Yes  Consent: Written consent obtained.  Risks and benefits: risks, benefits and alternatives were discussed  Consent given by: parent  Patient understanding: patient states understanding of the procedure being performed  Patient consent: the patient's understanding of the procedure matches consent given  Procedure consent: procedure consent matches procedure scheduled  Relevant documents: relevant documents present and verified  Test results: test results available and properly labeled  Site marked: the operative site was marked  Imaging studies: imaging studies available  Required items: required blood products, implants, devices, and special equipment available  Patient identity confirmed: , MRN, name and provided demographic data  Time out: Immediately prior to procedure a "time out" was called to verify the correct patient, procedure, equipment, support staff and site/side marked as required.  Preparation: Patient was prepped and draped in the usual sterile fashion.  Indications: hemodynamic monitoring  Location: right radial  Anesthesia: local infiltration    Anesthesia:  Local Anesthetic: lidocaine 1% without epinephrine  Anesthetic total: 0.5 mL  Patient sedated: yes  Sedatives: see MAR for details  Ambrocio's test normal: yes  Needle gauge: 20  Seldinger technique: Seldinger technique used  Number of attempts: 1  Complications: No  Estimated blood loss " (mL): 0  Specimens: No  Implants: No  Post-procedure: line sutured and dressing applied  Post-procedure CMS: unchanged  Patient tolerance: Patient tolerated the procedure well with no immediate complications          Yolanda De Los Santos  8/10/2019

## 2019-08-11 NOTE — PROGRESS NOTES
[facility]  Critical Care Medicine  Progress Note    Patient Name: Sisi Medel  MRN: [id number]  Admission Date: 8/3/2019  Hospital Length of Stay: 8 days  Code Status: Full Code  Attending Provider: Bang Cordero MD  Primary Care Provider: Carlos A Caputo MD   Principal Problem: Acute on chronic respiratory failure with hypoxia and hypercapnia    Subjective:     HPI:  Ms Medel  59 yo f with ESRD on HD, cervical radiculopathy, CHF, Chronic resp failure with hypoxia, T2DM, DDD, paroxysmal Afib, seizure, shingles, thyroid disorder here for a 3 day history of cough and SOB. Patient was at dialysis and had to stop at 1.6L due to resp distress. She was put in a nonrebreather at 12L of oxygen. She was transported via EMS. Overnight she required BiPAP for worsening SOB. Rapid response was called this morning because she was hypotensive 70s/50s. Dr. Rosas, MICU team, went and recheck BP was 107/59. She later became hypertensive again and continued to be on BiPAP, she was stepped up for dialysis, BP monitoring, possible vasopressor requirement and further respiratory monitoring.     During step up she was persistently hypoglycemic and hypotensive, peripheral vascular access was lost and a left TLC was placed at bedside. It was determined she could not tolerated her normal HD and she had tenuous respiratory status, so decision was made to intubate. She has been started on broad spectrum abx, and right HD IJ catheter was placed. Nephro been made aware and SCUF planned for today.     Hospital/ICU Course:  08/04 Stepped up to ICU, intubated, started on broad spectrum abx, HD catheter placed, plan is for SCUF today.   08/05 SCUF started in the AM, levo requirement of 0.06. Still intubated. Plan to transition to SLED. Blood cultures negative to date, covered with Vanc and Zosyn   08/06 On SLED today, had increase of pressor requirement was at 0.13 levo and .04 vaso, increase white count 13.3  and temp overnight 100.4. Sputum culture grew pseudomonas, d/c vancomycin, plan is to transition to SCUF.   08/07 Patient continued on levophed and vasopressin, with decreasing levophed requirements. Continued on zosyn for pseudomonas growing in respiratory cultures. SAT attempted this morning with significant tachypnea and agitation. Propofol reinitiated at prior rate, fentanyl at 75. Patient has corneal reflexes, spontaneously opens eyes but mental status is not at baseline - EEG and CTH ordered. CTH unremarkble, EEG performed but not yet read. Mental status improving, leukocytosis worsening but no evidence of infection. Likely related to stress dose steroids.   08/09 EEG result c/w encephalopathy; passed SAT; SBT: RSBI 88, however hypotensive with SBT and tachypneic to 35   08/10 holding tube feeds, SLED, on zosyn and bactrim for PNA and long term steroid use, and attempted extubation today. Re-intubated for increased WOB and high RR  08/11: Increasing levophed requirements, stable troponin, a-line placed overnight, respiratory and BCx repeated, started on vancomycin. Remains intubated. Restarted tube feeds. Aiming for net neutral output, stopped SLED          Interval History/Significant Events: Responding by head movement. WBC stable and afebrile overnight. Respiratory cultures grew Klebsiella on 8/9    Denies any pain, continues to respond by head movement.      Objective:     Vital Signs (Most Recent):  Temp: 99.8 °F (37.7 °C) (08/11/19 1100)  Pulse: (!) 135 (08/11/19 1659)  Resp: (!) 24 (08/08/19 0541)  BP: 103/66 (08/11/19 1300)  SpO2: 100 % (08/11/19 1659) Vital Signs (24h Range):  Temp:  [97.6 °F (36.4 °C)-99.8 °F (37.7 °C)] 99.8 °F (37.7 °C)  Pulse:  [] 135  SpO2:  [90 %-100 %] 100 %  BP: ()/(42-75) 103/66  Arterial Line BP: ()/(48-74) 97/57   Weight: 109.8 kg (242 lb)  Body mass index is 36.8 kg/m².      Intake/Output Summary (Last 24 hours) at 8/11/2019 8378  Last data filed at  8/11/2019 1300  Gross per 24 hour   Intake 3503.5 ml   Output 847 ml   Net 2656.5 ml       Physical Exam   Constitutional: She appears ill. She is sedated, intubated and restrained.   Morbidly obese AA female, intubated and sedated, currently off dialysis. Able to follow basic commands with extremities and move head. Mild chemosis    HENT:   Head: Normocephalic and atraumatic.   Right Ear: External ear normal.   Left Ear: External ear normal.   Eyes: Pupils are equal, round, and reactive to light. Conjunctivae and EOM are normal. Right eye exhibits no discharge. Left eye exhibits no discharge.   Mild chemosis   Neck: Normal range of motion. Neck supple.   Cardiovascular: Normal rate, regular rhythm and normal heart sounds.   Pulses:       Radial pulses are 2+ on the right side, and 2+ on the left side.   Pulmonary/Chest: Effort normal. No stridor. She is intubated. No respiratory distress. She has no wheezes. She has rales.   Abdominal: Soft. Bowel sounds are normal. There is no tenderness.   Musculoskeletal: Normal range of motion. She exhibits edema. She exhibits no tenderness or deformity.   Neurological: She is alert.   Able to follow some commands, 5/5 strength in bilateral hands   Skin: Skin is warm and dry. No erythema.   Psychiatric:   MAGNUS, Pt intubated on precedex       Vents:  Vent Mode: A/C (08/11/19 1659)  Ventilator Initiated: Yes (08/10/19 1742)  Set Rate: 16 bmp (08/11/19 1659)  Vt Set: 400 mL (08/11/19 1659)  Pressure Support: 10 cmH20 (08/10/19 1230)  PEEP/CPAP: 5 cmH20 (08/11/19 1659)  Oxygen Concentration (%): 40 (08/11/19 1659)  Peak Airway Pressure: 27 cmH2O (08/11/19 1659)  Plateau Pressure: 30 cmH20 (08/11/19 1659)  Total Ve: 9.29 mL (08/11/19 1659)  F/VT Ratio<105 (RSBI): (!) 55.43 (08/08/19 0541)  Lines/Drains/Airways     Central Venous Catheter Line                 Trialysis (Dialysis) Catheter 08/04/19 right internal jugular 7 days         Percutaneous Central Line Insertion/Assessment -  triple lumen  08/04/19 1803 left internal jugular 6 days          Drain                 Hemodialysis AV Graft Left upper arm -- days         NG/OG Tube 08/04/19 1230 7 days          Airway                 Airway - Non-Surgical 08/10/19 1756 Endotracheal Tube less than 1 day          Arterial Line                 Arterial Line 6 days              Significant Labs:    CBC/Anemia Profile:  Recent Labs   Lab 08/10/19  0314 08/10/19  1954 08/11/19  0340   WBC 45.07* 49.07* 44.56*   HGB 9.1* 9.7* 9.0*   HCT 30.9* 34.9* 31.7*   * 117* 113*   MCV 87 91 90   RDW 22.2* 23.0* 23.3*        Chemistries:  Recent Labs   Lab 08/10/19  0314  08/10/19  1956 08/10/19  2306 08/11/19  0340     138   < > 138 138 137  137   K 4.8  4.8   < > 4.7 4.5 4.7  4.7     102   < > 103 105 104  104   CO2 20*  20*   < > 18* 18* 18*  18*   BUN 8  8   < > 10 14 18  18   CREATININE 0.6  0.6   < > 0.8 0.9 1.2  1.2   CALCIUM 8.6*  8.6*   < > 9.0 8.5* 8.8  8.8   ALBUMIN 2.7*  2.7*   < > 2.8* 2.5* 2.6*  2.6*   PROT 7.3  --  7.5  --  6.8   BILITOT 1.2*  --  1.5*  --  1.3*   ALKPHOS 119  --  127  --  113   ALT 14  --  16  --  15   AST 29  --  33  --  33   MG 2.0   < > 2.1 1.9 1.9   PHOS 1.9*  1.9*   < > 3.7 4.1 4.8*  4.8*    < > = values in this interval not displayed.       All pertinent labs within the past 24 hours have been reviewed.    Significant Imaging:  I have reviewed all pertinent imaging results/findings within the past 24 hours.      ABG  Recent Labs   Lab 08/11/19  1120   PH 7.390   PO2 90   PCO2 34.5*   HCO3 20.9*   BE -4     Assessment/Plan:     Neuro  Encephalopathy  Mental status questionable despite weaning sedations  - change sedation to propofol and precedex  - SAT 8/8 AM  - CT H and EEG unremarkable    Epilepsy  Continue keppra. EEG showed encephalopathy.     Pulmonary  * Acute on chronic respiratory failure with hypoxia and hypercapnia  Hx of sarcoidosis, CHF, ESRD, and recent PNA.  -Coarse breath  sounds L>R, remains intubated, attempted extubated on 8/10, but re-intubated for increased WOB, tachypneia and expected clinical course  - CXR improving  - net negative 1.9L today  - continue on meropenem for klebsiella infection  - continue bactrim for PJP prophylaxis  - Consult ID for antibiotic assistance  - continue CRRT for net negative volume loss        Pneumonia  Course lung sounds and CXR concerning for PNA in LLL with interstitial fullness with recent rising WBC count. No fevers overnight. Respiratory cultures positive. With recent increase in pressor requirements, BCx, CXR and vancomycin started on 8/11overnight. Will continue meropenem for respiratory infection with concern for resistance.    - + MSSA, PSA and moraxella on 8/9   - continue bactrim for PJP prophylaxis w/ steroid use   - f/u on BCx   - respiratory cx positive for klebsiella oxytoca - on meropenem   - f/u fungitell and PJP PCR from bronch on 8/8    - trend WBC   - consulted ID, appreciate recs   - continue levo/vaso as needed for MAP >65    Pulmonary hypertension  Likely Group 2 pHTN related to HFrEF. Continue on dialysis     TTE (8/6/2019): PASP of 50        Cardiac/Vascular  Permanent atrial fibrillation  Patient presents with A fib in the ED. Patient is on chronic warfarin. S/P ablation multiple times. PVCs overnight and transitions to fentanyl from precedex with improvement. troponins stable.   Plan  -Continue heparin gtt  -Pacemaker in place       Acute on chronic systolic congestive heart failure  Patient has hx of HF (EF 35%). CXR in the ED suggestive of HF. BNP 1424 and troponins 0.156.  Patient is unsure if she takes her lisinopril at home.  - repeat TTE EF 30%, LVH, G3DD, LAE/JAGUAR, moderate RV systolic dysfunction   - continue CRRT with plan for net neutral I/O today  - increasing pressor requirements     Hypotension  On 10 mg midodrine TID at home. Currently on levophed 0.1 and vaso 0.4 based on BP cuff reads. Off propofol, only  precedex for sedation, but with PVCs, transitioned to fentanyl drip instead.  - Continue midodrine  - levophed/vaso - titrate to map 65  - fentanyl ggt      Renal/  ESRD on hemodialysis  - continue CRRT for goal of net neutral I/O, CXR improving, vasopressor stable  - appreciate nephro assistance      Immunology/Multi System  Pulmonary sarcoidosis  Stress dose steroids while in septic shock. Started on bactrim per ID with chronic steroid use    Hematology  Current use of long term anticoagulation  - heparin gtt while IP  - no evidence of hepatic dysfunction on labs - minor bili bump      GI  Constipation  On senna and colace    Other  Class 2 severe obesity due to excess calories with serious comorbidity and body mass index (BMI) of 38.0 to 38.9 in adult  Will need to revisit weight loss when improving  Significant pannus    Hypoalbuminemia  Restarted on tube feeds today    Current chronic use of systemic steroids  - no evidence of PJP ppx on MAR  - on stress dose during septic shock   - will need PJP PPX if remaining on prolonged steroids for sarcoid     Counseling regarding goals of care  Attempted to discuss goals of care with family on 8/10. Patient's adult children would not like to have this discussion at this time. Patient appears comfortable on vent.        Critical Care Daily Checklist:    A: Awake: RASS Goal/Actual Goal: RASS Goal: (P) -2-->light sedation  Actual: Horton Agitation Sedation Scale (RASS): Drowsy   B: Spontaneous Breathing Trial Performed? Spon. Breathing Trial Initiated?: Initiated (08/10/19 0800)   C: SAT & SBT Coordinated?  Not attempted today                      D: Delirium: CAM-ICU Overall CAM-ICU: Positive   E: Early Mobility Performed? No   F: Feeding Goal: Goals: Patient to meet > 85% EEN and EPN by RD follow-up  Status: Nutrition Goal Status: goal not met   Current Diet Order   Procedures    Diet NPO Except for: Medication     Order Specific Question:   Except for     Answer:    Medication      AS: Analgesia/Sedation Fentanyl ggt   T: Thromboembolic Prophylaxis Heparin ggt   H: HOB > 300 Yes   U: Stress Ulcer Prophylaxis (if needed) pantoprazole   G: Glucose Control SSI   B: Bowel Function     I: Indwelling Catheter (Lines & Yepez) Necessity n/a   D: De-escalation of Antimicrobials/Pharmacotherapies As indicated by clinical course    Plan for the day/ETD Monitor volume status, start tube feeds, wean pressor requirements as tolerated    Code Status:  Family/Goals of Care: Full Code  n/a       Critical secondary to Patient has a condition that poses threat to life and bodily function: Severe Respiratory Distress      Critical care was time spent personally by me on the following activities: development of treatment plan with patient or surrogate and bedside caregivers, discussions with consultants, evaluation of patient's response to treatment, examination of patient, ordering and performing treatments and interventions, ordering and review of laboratory studies, ordering and review of radiographic studies, pulse oximetry, re-evaluation of patient's condition. This critical care time did not overlap with that of any other provider or involve time for any procedures.     Jaja Weeks MD  Critical Care Medicine  [facility]

## 2019-08-11 NOTE — PROGRESS NOTES
Pharmacokinetic Initial Assessment: IV Vancomycin    Assessment/Plan:    Initiate intravenous vancomycin with loading dose of 1750 mg once with subsequent doses when random concentrations are less than 20 mcg/mL  Desired empiric serum trough concentration is 15 to 20 mcg/mL  Draw vancomycin random level on 8/11 at 0500.  Pharmacy will continue to follow and monitor vancomycin.      Please contact pharmacy at extension [phone] with any questions regarding this assessment.     Thank you for the consult,   Litzy Bosch       Patient brief summary:  Sisi Medel is a 58 y.o. female initiated on antimicrobial therapy with IV Vancomycin for treatment of suspected bacteremia    Drug Allergies:   Review of patient's allergies indicates:   Allergen Reactions    Bactrim [sulfamethoxazole-trimethoprim] Other (See Comments)     Causes renal failure    Nsaids (non-steroidal anti-inflammatory drug) Other (See Comments)     Renal failure       Actual Body Weight:   109.8kg    Renal Function:   Estimated Creatinine Clearance: 132.1 mL/min (based on SCr of 0.6 mg/dL).,     Dialysis Method (if applicable):  CRRT    CBC (last 72 hours):  Recent Labs   Lab Result Units 08/08/19 0300 08/09/19  0300 08/10/19  0314   WBC K/uL 34.10* 46.97* 45.07*   Hemoglobin g/dL 8.9* 9.1* 9.1*   Hematocrit % 32.1* 31.5* 30.9*   Platelets K/uL 125* 115* 106*   Gran% % 71.5 77.0* 86.0*   Lymph% % 7.0* 5.0* 1.0*   Mono% % 6.5 6.0 6.0   Eosinophil% % 0.0 0.0 0.0   Basophil% % 0.0 0.0 0.0   Differential Method  Manual Manual Manual       Metabolic Panel (last 72 hours):  Recent Labs   Lab Result Units 08/07/19  2200 08/08/19  0300 08/08/19  1414 08/08/19  2200 08/09/19  0300 08/09/19  1325 08/09/19  2127 08/10/19  0314 08/10/19  1400   Sodium mmol/L 134* 136  136 137 138 135*  135* 135* 135* 138  138 138   Potassium mmol/L 4.6 4.9  4.9 4.6 4.8 4.7  4.7 4.9 4.9 4.8  4.8 4.7   Chloride mmol/L 103 103  103 103 101 100  100 104 102 102   102 101   CO2 mmol/L 16* 16*  16* 19* 20* 18*  18* 18* 20* 20*  20* 20*   Glucose mg/dL 242* 216*  216* 190* 170* 215*  215* 176* 167* 160*  160* 165*   BUN, Bld mg/dL 32* 36*  36* 30* 12 15  15 10 10 8  8 8   Creatinine mg/dL 2.5* 2.8*  2.8* 2.1* 0.9 1.0  1.0 0.8 0.7 0.6  0.6 0.6   Albumin g/dL 2.5* 2.6*  2.6* 2.8* 2.8* 2.7*  2.7* 2.8* 2.7* 2.7*  2.7* 2.8*   Total Bilirubin mg/dL  --  1.1*  --   --  1.2*  --   --  1.2*  --    Alkaline Phosphatase U/L  --  124  --   --  127  --   --  119  --    AST U/L  --  34  --   --  28  --   --  29  --    ALT U/L  --  17  --   --  14  --   --  14  --    Magnesium mg/dL 2.4 2.5 2.4 2.0 2.3 2.0 2.1 2.0 2.0   Phosphorus mg/dL 5.5* 5.5*  5.5* 3.8 2.0* 2.6*  2.6* 2.9 2.5* 1.9*  1.9* 3.1  3.1       Drug levels (last 3 results):  No results for input(s): VANCOMYCINRA, VANCOMYCINPE, VANCOMYCINTR in the last 72 hours.    Microbiologic Results:  Microbiology Results (last 7 days)     Procedure Component Value Units Date/Time    Blood culture [[id number]]     Order Status:  No result Specimen:  Blood     Blood culture [[id number]]     Order Status:  No result Specimen:  Blood     Culture, Respiratory with Gram Stain [[id number]]     Order Status:  No result Specimen:  Respiratory     Fungus culture [[id number]] Collected:  08/09/19 1622    Order Status:  No result Specimen:  Bronchial Alveolar Lavage Updated:  08/10/19 1752    AFB Culture & Smear [[id number]] Collected:  08/09/19 1622    Order Status:  Completed Specimen:  Body Fluid from Lung, RML Updated:  08/10/19 1413     AFB CULTURE STAIN No acid fast bacilli seen.    Narrative:       Bronchial Wash    Culture, Respiratory [[id number]]  (Abnormal) Collected:  08/09/19 1622    Order Status:  Completed Specimen:  Respiratory from BAL, RML Updated:  08/10/19 1255     Respiratory Culture KLEBSIELLA OXYTOCA  Moderate  Susceptibility pending  Normal respiratory delio also present       Gram Stain (Respiratory) <10 epithelial  cells per low power field.     Gram Stain (Respiratory) Moderate WBC's     Gram Stain (Respiratory) Rare Gram positive cocci     Gram Stain (Respiratory) Rare Gram negative rods    Narrative:       Bronchial Wash    Fungus culture [[id number]]     Order Status:  Completed Specimen:  Respiratory from BAL, RLL     Fungus culture [[id number]] Collected:  08/09/19 1622    Order Status:  Sent Specimen:  Body Fluid from Lung, RML Updated:  08/09/19 1755    Respiratory Viral Panel by PCR [facility]; Sputum (BAL ) [[id number]] Collected:  08/09/19 1628    Order Status:  Sent Specimen:  Bronchial Wash Updated:  08/09/19 1754    Gram stain [[id number]] Collected:  08/09/19 1622    Order Status:  Canceled Specimen:  Body Fluid from Lung, RML     Blood culture [[id number]] Collected:  08/04/19 1402    Order Status:  Completed Specimen:  Blood from Peripheral, Wrist, Right Updated:  08/09/19 1612     Blood Culture, Routine No growth after 5 days.    Blood culture [[id number]] Collected:  08/04/19 1400    Order Status:  Completed Specimen:  Blood from Line, Jugular, Internal Right Updated:  08/09/19 1612     Blood Culture, Routine No growth after 5 days.    Culture, Respiratory with Gram Stain [[id number]]  (Abnormal)  (Susceptibility) Collected:  08/04/19 1505    Order Status:  Completed Specimen:  Respiratory from Endotracheal Aspirate Updated:  08/09/19 1444     Respiratory Culture PSEUDOMONAS AERUGINOSA  Moderate        STENOTROPHOMONAS (X.) MALTOPHILIA  Moderate        MORAXELLA (BRANHAMELLA) CATARRHALIS  Many  Beta Lactamase positive       Comment: Previous comment was modified by YOSELYN at 14:27 on 08/07/2019  Beta Lactamase positive  Normal respiratory delio also present           STAPHYLOCOCCUS AUREUS  Moderate  Normal respiratory delio also present       Gram Stain (Respiratory) <10 epithelial cells per low power field.     Gram Stain (Respiratory) Many Gram negative diplococci     Gram Stain (Respiratory) Many Gram negative cocci      Gram Stain (Respiratory) Few Gram positive cocci     Gram Stain (Respiratory) Few WBC's

## 2019-08-11 NOTE — ASSESSMENT & PLAN NOTE
58F PMH pulmonary sarcoid on chronic steroids, ESRD on HD presented w/ acute on chronic respiratory failure, now intubated in ICU. ID consulted for management of PNA. ETT culture from 8/4 + MSSA, steno, pseudomonas, and moraxella    Recommendations:  - continue meropenem for coverage of MSSA, PSA and moraxella, and now ESBL kleb oxytoca  - continue bactrim for steno coverage  - fungitell and PJP PCR from bronch on 8/8 is pending    ID will follow

## 2019-08-11 NOTE — PROGRESS NOTES
Pharmacokinetic Assessment Follow Up: IV Vancomycin    Vancomycin serum concentration assessment(s):    The random level was drawn correctly and can be used to guide therapy at this time. The measurement is above the desired definitive target range of 15 to 20 mcg/mL.    Vancomycin Regimen Plan:    · Continue to hold vancomycin  · Re-dose when the random level is less than 20 mcg/mL, next level to be drawn with am labs tomorrow morning.     Drug levels (last 3 results):  Recent Labs   Lab Result Units 08/11/19  0340   Vancomycin, Random ug/mL 35.3       Pharmacy will continue to follow and monitor vancomycin.    Please contact pharmacy at extension [phone] for questions regarding this assessment.    Thank you for the consult,   Kieran Quiñones       Patient brief summary:  Sisi Medel is a 58 y.o. female initiated on antimicrobial therapy with IV Vancomycin for treatment of bacteremia    The patient's current regimen is being held, vancomycin 1750 mg was given once at 2050 on 8/10.     Drug Allergies:   Review of patient's allergies indicates:   Allergen Reactions    Bactrim [sulfamethoxazole-trimethoprim] Other (See Comments)     Causes renal failure    Nsaids (non-steroidal anti-inflammatory drug) Other (See Comments)     Renal failure       Actual Body Weight:   109.8 kg    Renal Function:   Estimated Creatinine Clearance: 66.1 mL/min (based on SCr of 1.2 mg/dL).,     Dialysis Method (if applicable):  SLED    CBC (last 72 hours):  Recent Labs   Lab Result Units 08/09/19  0300 08/10/19  0314 08/10/19  1954 08/11/19  0340   WBC K/uL 46.97* 45.07* 49.07* 44.56*   Hemoglobin g/dL 9.1* 9.1* 9.7* 9.0*   Hematocrit % 31.5* 30.9* 34.9* 31.7*   Platelets K/uL 115* 106* 117* 113*   Gran% % 77.0* 86.0* 84.0* 87.0*   Lymph% % 5.0* 1.0* 6.0* 5.0*   Mono% % 6.0 6.0 4.0 2.0*   Eosinophil% % 0.0 0.0 1.0 0.0   Basophil% % 0.0 0.0 0.0 0.0   Differential Method  Manual Manual Manual Manual       Metabolic Panel (last 72  hours):  Recent Labs   Lab Result Units 08/08/19  1414 08/08/19  2200 08/09/19  0300 08/09/19  1325 08/09/19  2127 08/10/19  0314 08/10/19  1400 08/10/19  1956 08/10/19  2306 08/11/19  0340   Sodium mmol/L 137 138 135*  135* 135* 135* 138  138 138 138 138 137  137   Potassium mmol/L 4.6 4.8 4.7  4.7 4.9 4.9 4.8  4.8 4.7 4.7 4.5 4.7  4.7   Chloride mmol/L 103 101 100  100 104 102 102  102 101 103 105 104  104   CO2 mmol/L 19* 20* 18*  18* 18* 20* 20*  20* 20* 18* 18* 18*  18*   Glucose mg/dL 190* 170* 215*  215* 176* 167* 160*  160* 165* 179* 245* 267*  267*   BUN, Bld mg/dL 30* 12 15  15 10 10 8  8 8 10 14 18  18   Creatinine mg/dL 2.1* 0.9 1.0  1.0 0.8 0.7 0.6  0.6 0.6 0.8 0.9 1.2  1.2   Albumin g/dL 2.8* 2.8* 2.7*  2.7* 2.8* 2.7* 2.7*  2.7* 2.8* 2.8* 2.5* 2.6*  2.6*   Total Bilirubin mg/dL  --   --  1.2*  --   --  1.2*  --  1.5*  --  1.3*   Alkaline Phosphatase U/L  --   --  127  --   --  119  --  127  --  113   AST U/L  --   --  28  --   --  29  --  33  --  33   ALT U/L  --   --  14  --   --  14  --  16  --  15   Magnesium mg/dL 2.4 2.0 2.3 2.0 2.1 2.0 2.0 2.1 1.9 1.9   Phosphorus mg/dL 3.8 2.0* 2.6*  2.6* 2.9 2.5* 1.9*  1.9* 3.1  3.1 3.7 4.1 4.8*  4.8*       Vancomycin Administrations:  vancomycin given in the last 96 hours                   vancomycin (VANCOCIN) 1,750 mg in sodium chloride 0.9% 500 mL IVPB (mg) 1,750 mg New Bag 08/10/19 2050                Microbiologic Results:  Microbiology Results (last 7 days)     Procedure Component Value Units Date/Time    Blood culture [[id number]] Collected:  08/10/19 2130    Order Status:  Completed Specimen:  Blood from Peripheral, Antecubital, Right Updated:  08/11/19 0515     Blood Culture, Routine No Growth to date    Blood culture [[id number]] Collected:  08/10/19 2043    Order Status:  Completed Specimen:  Blood from Line, Arterial, Right Updated:  08/11/19 0345     Blood Culture, Routine No Growth to date    Culture, Respiratory with  Gram Stain [[id number]] Collected:  08/10/19 2144    Order Status:  Completed Specimen:  Respiratory from Endotracheal Aspirate Updated:  08/11/19 0246     Gram Stain (Respiratory) No WBC's     Gram Stain (Respiratory) No organisms seen    AFB Culture & Smear [[id number]] Collected:  08/09/19 1622    Order Status:  Completed Specimen:  Body Fluid from Lung, RML Updated:  08/10/19 2127     AFB Culture & Smear Culture in progress     AFB CULTURE STAIN No acid fast bacilli seen.    Narrative:       Bronchial Wash    Fungus culture [[id number]] Collected:  08/09/19 1622    Order Status:  No result Specimen:  Bronchial Alveolar Lavage Updated:  08/10/19 1752    Culture, Respiratory [[id number]]  (Abnormal) Collected:  08/09/19 1622    Order Status:  Completed Specimen:  Respiratory from BAL, RML Updated:  08/10/19 1255     Respiratory Culture KLEBSIELLA OXYTOCA  Moderate  Susceptibility pending  Normal respiratory delio also present       Gram Stain (Respiratory) <10 epithelial cells per low power field.     Gram Stain (Respiratory) Moderate WBC's     Gram Stain (Respiratory) Rare Gram positive cocci     Gram Stain (Respiratory) Rare Gram negative rods    Narrative:       Bronchial Wash    Fungus culture [[id number]]     Order Status:  Completed Specimen:  Respiratory from BAL, RLL     Fungus culture [[id number]] Collected:  08/09/19 1622    Order Status:  Sent Specimen:  Body Fluid from Lung, RML Updated:  08/09/19 1755    Respiratory Viral Panel by PCR [facility]; Sputum (BAL ) [[id number]] Collected:  08/09/19 1628    Order Status:  Sent Specimen:  Bronchial Wash Updated:  08/09/19 1754    Gram stain [[id number]] Collected:  08/09/19 1622    Order Status:  Canceled Specimen:  Body Fluid from Lung, RML     Blood culture [[id number]] Collected:  08/04/19 1402    Order Status:  Completed Specimen:  Blood from Peripheral, Wrist, Right Updated:  08/09/19 1612     Blood Culture, Routine No growth after 5 days.    Blood culture  [[id number]] Collected:  08/04/19 1400    Order Status:  Completed Specimen:  Blood from Line, Jugular, Internal Right Updated:  08/09/19 1612     Blood Culture, Routine No growth after 5 days.    Culture, Respiratory with Gram Stain [[id number]]  (Abnormal)  (Susceptibility) Collected:  08/04/19 1505    Order Status:  Completed Specimen:  Respiratory from Endotracheal Aspirate Updated:  08/09/19 1444     Respiratory Culture PSEUDOMONAS AERUGINOSA  Moderate        STENOTROPHOMONAS (X.) MALTOPHILIA  Moderate        MORAXELLA (BRANHAMELLA) CATARRHALIS  Many  Beta Lactamase positive       Comment: Previous comment was modified by YOSELYN at 14:27 on 08/07/2019  Beta Lactamase positive  Normal respiratory delio also present           STAPHYLOCOCCUS AUREUS  Moderate  Normal respiratory delio also present       Gram Stain (Respiratory) <10 epithelial cells per low power field.     Gram Stain (Respiratory) Many Gram negative diplococci     Gram Stain (Respiratory) Many Gram negative cocci     Gram Stain (Respiratory) Few Gram positive cocci     Gram Stain (Respiratory) Few WBC's

## 2019-08-11 NOTE — ASSESSMENT & PLAN NOTE
Course lung sounds and CXR concerning for PNA in LLL with interstitial fullness with recent rising WBC count. No fevers overnight. Respiratory cultures positive. With recent increase in pressor requirements, BCx, CXR and vancomycin started on 8/11overnight. Will continue meropenem for respiratory infection with concern for resistance.    - + MSSA, PSA and moraxella on 8/9   - continue bactrim for PJP prophylaxis w/ steroid use   - f/u on BCx   - respiratory cx positive for klebsiella oxytoca - on meropenem   - f/u fungitell and PJP PCR from bronch on 8/8    - trend WBC   - consulted ID, appreciate recs   - continue levo/vaso as needed for MAP >65

## 2019-08-11 NOTE — SUBJECTIVE & OBJECTIVE
Interval History: reintubated last night, abx changed to meropenem, CXR w/ increased R sided infiltrate. On increasing doses of vaso and levo.    Review of Systems   Unable to perform ROS: Intubated     Objective:     Vital Signs (Most Recent):  Temp: 99.8 °F (37.7 °C) (08/11/19 1100)  Pulse: 71 (08/11/19 1300)  Resp: (!) 24 (08/08/19 0541)  BP: 103/66 (08/11/19 1300)  SpO2: 97 % (08/11/19 1300) Vital Signs (24h Range):  Temp:  [97.6 °F (36.4 °C)-99.8 °F (37.7 °C)] 99.8 °F (37.7 °C)  Pulse:  [66-75] 71  SpO2:  [90 %-100 %] 97 %  BP: ()/(42-75) 103/66  Arterial Line BP: ()/(48-74) 97/57     Weight: 109.8 kg (242 lb)  Body mass index is 36.8 kg/m².    Estimated Creatinine Clearance: 67.2 mL/min (based on SCr of 1.2 mg/dL).    Physical Exam   Constitutional: She is oriented to person, place, and time. She appears well-developed and well-nourished. No distress.   HENT:   Right Ear: External ear normal.   Left Ear: External ear normal.   Nose: Nose normal.   Eyes: Conjunctivae and EOM are normal.   Neck: Normal range of motion. Neck supple.   Cardiovascular: Normal rate, regular rhythm, normal heart sounds and intact distal pulses.   No murmur heard.  Pulmonary/Chest: Effort normal and breath sounds normal. No respiratory distress. She has no wheezes.   intubated   Abdominal: Soft. Bowel sounds are normal. She exhibits no distension. There is no tenderness.   Musculoskeletal: Normal range of motion. She exhibits no edema.   Neurological: She is alert and oriented to person, place, and time. No cranial nerve deficit. Coordination normal.   Skin: Skin is warm and dry. No rash noted. She is not diaphoretic. No erythema.   Psychiatric: She has a normal mood and affect. Her behavior is normal.   Vitals reviewed.      Significant Labs:   CBC:   Recent Labs   Lab 08/10/19  0314 08/10/19  1954 08/11/19  0340   WBC 45.07* 49.07* 44.56*   HGB 9.1* 9.7* 9.0*   HCT 30.9* 34.9* 31.7*   * 117* 113*     CMP:   Recent  Labs   Lab 08/10/19  0314  08/10/19  1956 08/10/19  2306 08/11/19  0340     138   < > 138 138 137  137   K 4.8  4.8   < > 4.7 4.5 4.7  4.7     102   < > 103 105 104  104   CO2 20*  20*   < > 18* 18* 18*  18*   *  160*   < > 179* 245* 267*  267*   BUN 8  8   < > 10 14 18  18   CREATININE 0.6  0.6   < > 0.8 0.9 1.2  1.2   CALCIUM 8.6*  8.6*   < > 9.0 8.5* 8.8  8.8   PROT 7.3  --  7.5  --  6.8   ALBUMIN 2.7*  2.7*   < > 2.8* 2.5* 2.6*  2.6*   BILITOT 1.2*  --  1.5*  --  1.3*   ALKPHOS 119  --  127  --  113   AST 29  --  33  --  33   ALT 14  --  16  --  15   ANIONGAP 16  16   < > 17* 15 15  15   EGFRNONAA >60.0  >60.0   < > >60.0 >60.0 50.3*  50.3*    < > = values in this interval not displayed.     Microbiology Results (last 7 days)     Procedure Component Value Units Date/Time    Culture, Respiratory [[id number]]  (Abnormal)  (Susceptibility) Collected:  08/09/19 1622    Order Status:  Completed Specimen:  Respiratory from BAL, RML Updated:  08/11/19 1307     Respiratory Culture KLEBSIELLA OXYTOCA ESBL  Moderate  Normal respiratory delio also present       Gram Stain (Respiratory) <10 epithelial cells per low power field.     Gram Stain (Respiratory) Moderate WBC's     Gram Stain (Respiratory) Rare Gram positive cocci     Gram Stain (Respiratory) Rare Gram negative rods    Narrative:       Bronchial Wash    Blood culture [[id number]] Collected:  08/10/19 2130    Order Status:  Completed Specimen:  Blood from Peripheral, Antecubital, Right Updated:  08/11/19 0515     Blood Culture, Routine No Growth to date    Blood culture [[id number]] Collected:  08/10/19 2043    Order Status:  Completed Specimen:  Blood from Line, Arterial, Right Updated:  08/11/19 0345     Blood Culture, Routine No Growth to date    Culture, Respiratory with Gram Stain [[id number]] Collected:  08/10/19 2144    Order Status:  Completed Specimen:  Respiratory from Endotracheal Aspirate Updated:  08/11/19 0249      Gram Stain (Respiratory) No WBC's     Gram Stain (Respiratory) No organisms seen    AFB Culture & Smear [[id number]] Collected:  08/09/19 1622    Order Status:  Completed Specimen:  Body Fluid from Lung, RML Updated:  08/10/19 2127     AFB Culture & Smear Culture in progress     AFB CULTURE STAIN No acid fast bacilli seen.    Narrative:       Bronchial Wash    Fungus culture [[id number]] Collected:  08/09/19 1622    Order Status:  No result Specimen:  Bronchial Alveolar Lavage Updated:  08/10/19 1752    Fungus culture [[id number]]     Order Status:  Completed Specimen:  Respiratory from BAL, RLL     Fungus culture [[id number]] Collected:  08/09/19 1622    Order Status:  Sent Specimen:  Body Fluid from Lung, RML Updated:  08/09/19 1755    Respiratory Viral Panel by PCR [facility]; Sputum (BAL ) [[id number]] Collected:  08/09/19 1628    Order Status:  Sent Specimen:  Bronchial Wash Updated:  08/09/19 1754    Gram stain [[id number]] Collected:  08/09/19 1622    Order Status:  Canceled Specimen:  Body Fluid from Lung, RML     Blood culture [[id number]] Collected:  08/04/19 1402    Order Status:  Completed Specimen:  Blood from Peripheral, Wrist, Right Updated:  08/09/19 1612     Blood Culture, Routine No growth after 5 days.    Blood culture [[id number]] Collected:  08/04/19 1400    Order Status:  Completed Specimen:  Blood from Line, Jugular, Internal Right Updated:  08/09/19 1612     Blood Culture, Routine No growth after 5 days.    Culture, Respiratory with Gram Stain [[id number]]  (Abnormal)  (Susceptibility) Collected:  08/04/19 1505    Order Status:  Completed Specimen:  Respiratory from Endotracheal Aspirate Updated:  08/09/19 1444     Respiratory Culture PSEUDOMONAS AERUGINOSA  Moderate        STENOTROPHOMONAS (X.) MALTOPHILIA  Moderate        MORAXELLA (BRANHAMELLA) CATARRHALIS  Many  Beta Lactamase positive       Comment: Previous comment was modified by YOSELYN at 14:27 on 08/07/2019  Beta Lactamase positive  Normal  respiratory delio also present           STAPHYLOCOCCUS AUREUS  Moderate  Normal respiratory delio also present       Gram Stain (Respiratory) <10 epithelial cells per low power field.     Gram Stain (Respiratory) Many Gram negative diplococci     Gram Stain (Respiratory) Many Gram negative cocci     Gram Stain (Respiratory) Few Gram positive cocci     Gram Stain (Respiratory) Few WBC's          Significant Imaging: I have reviewed all pertinent imaging results/findings within the past 24 hours.

## 2019-08-11 NOTE — PROGRESS NOTES
Pt right hand with dusky appearance. Pulses weak dopplerable. Dr. Knapp notified and to the bedside. No new orders at this time. TM.

## 2019-08-12 NOTE — ASSESSMENT & PLAN NOTE
Patient has hx of HF (EF 35%). CXR in the ED suggestive of HF. BNP 1424 and troponins 0.156.  Patient is unsure if she takes her lisinopril at home.  - repeat TTE EF 25% on 8/11, LVH, G3DD, LAE/JAGUAR, moderate RV systolic dysfunction   - Continue CRRT with plan for net neutral I/O today  - Increasing pressor requirements

## 2019-08-12 NOTE — ASSESSMENT & PLAN NOTE
Hx of sarcoidosis, CHF, ESRD, and recent PNA.  -Coarse breath sounds L>R, remains intubated, attempted extubated on 8/10, but re-intubated for increased WOB, tachypneia and expected clinical course  - continue on meropenem for klebsiella infection  - continue bactrim for PJP prophylaxis  - Antibiotics per ID recs  - continue CRRT for net negative volume loss

## 2019-08-12 NOTE — ASSESSMENT & PLAN NOTE
- no evidence of PJP ppx on MAR  - on stress dose during septic shock   - On bactrim for PJP prophylaxis

## 2019-08-12 NOTE — ASSESSMENT & PLAN NOTE
Attempted to discuss goals of care with family on 8/10. Patient's adult children would not like to have this discussion at this time. Patient appears comfortable on vent. Will revisit goals of care discussion with family.

## 2019-08-12 NOTE — ASSESSMENT & PLAN NOTE
Course lung sounds and CXR concerning for PNA in LLL with interstitial fullness with leukocytosis without fevers. Respiratory cultures positive.    - ETT culture from 8/4 + MSSA, PSA and moraxella   - continue bactrim for PJP prophylaxis w/ steroid use   - respiratory cx positive for ESBL klebsiella oxytoca - on meropenem   - f/u fungitell and PJP PCR from bronch on 8/8    - f/u blood cultures   - trend WBC (currently downtrending)   - ID following

## 2019-08-12 NOTE — ASSESSMENT & PLAN NOTE
Mental status improving. Currently on fentanyl 150mcg for sedation  - Add precedex in order to decrease fentanyl dosage. Precedex previously d/c due to frequent PVC's however pressor requirements at that time was also high. Now that levophed dosage has decreased, will restart precedex.   - CTH unremarkable, EEG 8/7 showed encephalopathy

## 2019-08-12 NOTE — PROGRESS NOTES
[facility]  Infectious Disease  Progress Note    Patient Name: Sisi Medel  MRN: [id number]  Admission Date: 8/3/2019  Length of Stay: 9 days  Attending Physician: Edward Le*  Primary Care Provider: Carlos A Caputo MD    Isolation Status: Contact  Assessment/Plan:      * Acute on chronic respiratory failure with hypoxia and hypercapnia  58F PMH pulmonary sarcoid on chronic steroids, ESRD on HD presented w/ acute on chronic respiratory failure, now intubated in ICU. ID consulted for management of PNA. ETT culture from 8/4 + MSSA, steno, pseudomonas, and moraxella    Recommendations:  - continue meropenem for coverage of MSSA, PSA and moraxella, and now ESBL kleb oxytoca  - continue bactrim for steno coverage  - recommend 14 day course of meropenem and bactrim    ID will sign off. Please call back if needed.         Anticipated Disposition: TBD    Thank you for your consult. I will sign off. Please contact us if you have any additional questions.      Varsha Lozano DO  Transplant ID  Infectious Disease Fellow  C: [phone]  P: [phone]      Subjective:     Principal Problem:Acute on chronic respiratory failure with hypoxia and hypercapnia    HPI: 58F PMH ESRD on HD, CHF, chronic respiratory failure 2/2 pulmonary sarcoid on chronic prednisone, DM2, Afib who presented from HD w/ worsening respiratory status on 8/3. She is now intubated in ICU. ETT aspirate positive with multiple organisms on 8/4. Patient has been on pip-tazo for several days w/ worsening WBC and continues to require pressors. CXR b/l infiltrates. Culture + moraxella, MSSA, PSA and steno. Patient is unable to provide further history as she remains intubated. She underwent bronch today w/ repeat culture ordered.  Interval History: remains intubated on pressors, awake on vent. Discussed w/ mother and sister at bedside    Review of Systems   Unable to perform ROS: Intubated   Objective:     Vital Signs (Most  Recent):  Temp: 97.1 °F (36.2 °C) (08/12/19 1300)  Pulse: 74 (08/12/19 1400)  Resp: (!) 24 (08/08/19 0541)  BP: 104/76 (08/12/19 1400)  SpO2: 98 % (08/12/19 1400) Vital Signs (24h Range):  Temp:  [97.1 °F (36.2 °C)-98.7 °F (37.1 °C)] 97.1 °F (36.2 °C)  Pulse:  [] 74  SpO2:  [86 %-100 %] 98 %  BP: ()/(52-79) 104/76  Arterial Line BP: ()/(51-70) 123/63     Weight: 109.8 kg (242 lb)  Body mass index is 36.8 kg/m².    Estimated Creatinine Clearance: 44.8 mL/min (A) (based on SCr of 1.8 mg/dL (H)).    Physical Exam   Constitutional: She is oriented to person, place, and time. She appears well-developed and well-nourished. No distress.   HENT:   Right Ear: External ear normal.   Left Ear: External ear normal.   Nose: Nose normal.   Eyes: Conjunctivae and EOM are normal.   Neck: Normal range of motion. Neck supple.   Cardiovascular: Normal rate, regular rhythm, normal heart sounds and intact distal pulses.   No murmur heard.  Pulmonary/Chest: Effort normal and breath sounds normal. No respiratory distress. She has no wheezes.   intubated   Abdominal: Soft. Bowel sounds are normal. She exhibits no distension. There is no tenderness.   Musculoskeletal: Normal range of motion. She exhibits no edema.   Neurological: She is alert and oriented to person, place, and time. No cranial nerve deficit. Coordination normal.   Skin: Skin is warm and dry. No rash noted. She is not diaphoretic. No erythema.   Psychiatric: She has a normal mood and affect. Her behavior is normal.   Vitals reviewed.      Significant Labs:   CBC:   Recent Labs   Lab 08/10/19  1954 08/11/19  0340 08/12/19  0302 08/12/19  0714   WBC 49.07* 44.56* 39.70*  --    HGB 9.7* 9.0* 8.1*  --    HCT 34.9* 31.7* 28.8* 30*   * 113* 117*  --      CMP:   Recent Labs   Lab 08/10/19  1956  08/11/19  0340 08/11/19  2131 08/12/19  0302 08/12/19  1357      < > 137  137 133* 134*  134* 135*   K 4.7   < > 4.7  4.7 4.8 5.6*  5.6* 4.8      <  > 104  104 103 101  101 103   CO2 18*   < > 18*  18* 17* 18*  18* 22*   *   < > 267*  267* 232* 208*  208* 205*   BUN 10   < > 18  18 28* 30*  30* 21*   CREATININE 0.8   < > 1.2  1.2 2.0* 2.4*  2.4* 1.8*   CALCIUM 9.0   < > 8.8  8.8 8.3* 8.3*  8.3* 8.1*   PROT 7.5  --  6.8  --  6.5  --    ALBUMIN 2.8*   < > 2.6*  2.6* 2.2* 2.3*  2.3* 2.2*   BILITOT 1.5*  --  1.3*  --  1.0  --    ALKPHOS 127  --  113  --  112  --    AST 33  --  33  --  51*  --    ALT 16  --  15  --  18  --    ANIONGAP 17*   < > 15  15 13 15  15 10   EGFRNONAA >60.0   < > 50.3*  50.3* 27.1* 21.8*  21.8* 30.8*    < > = values in this interval not displayed.     Microbiology Results (last 7 days)     Procedure Component Value Units Date/Time    Culture, Respiratory with Gram Stain [[id number]]  (Abnormal) Collected:  08/10/19 2144    Order Status:  Completed Specimen:  Respiratory from Endotracheal Aspirate Updated:  08/12/19 1112     Respiratory Culture KLEBSIELLA OXYTOCA  Moderate  Susceptibility pending       Gram Stain (Respiratory) No WBC's     Gram Stain (Respiratory) No organisms seen    Culture, Respiratory [[id number]]  (Abnormal)  (Susceptibility) Collected:  08/09/19 1622    Order Status:  Completed Specimen:  Respiratory from BAL, RML Updated:  08/12/19 0734     Respiratory Culture No S aureus isolated.      KLEBSIELLA OXYTOCA ESBL  Moderate  Normal respiratory delio also present       Gram Stain (Respiratory) <10 epithelial cells per low power field.     Gram Stain (Respiratory) Moderate WBC's     Gram Stain (Respiratory) Rare Gram positive cocci     Gram Stain (Respiratory) Rare Gram negative rods    Narrative:       Bronchial Wash    Blood culture [[id number]] Collected:  08/10/19 2043    Order Status:  Completed Specimen:  Blood from Line, Arterial, Right Updated:  08/11/19 2212     Blood Culture, Routine No Growth to date      No Growth to date    Blood culture [[id number]] Collected:  08/10/19 2130    Order Status:   Completed Specimen:  Blood from Peripheral, Antecubital, Right Updated:  08/11/19 2212     Blood Culture, Routine No Growth to date      No Growth to date    AFB Culture & Smear [[id number]] Collected:  08/09/19 1622    Order Status:  Completed Specimen:  Body Fluid from Lung, RML Updated:  08/10/19 2127     AFB Culture & Smear Culture in progress     AFB CULTURE STAIN No acid fast bacilli seen.    Narrative:       Bronchial Wash    Fungus culture [[id number]] Collected:  08/09/19 1622    Order Status:  No result Specimen:  Bronchial Alveolar Lavage Updated:  08/10/19 1752    Fungus culture [[id number]]     Order Status:  Completed Specimen:  Respiratory from BAL, RLL     Fungus culture [[id number]] Collected:  08/09/19 1622    Order Status:  Sent Specimen:  Body Fluid from Lung, RML Updated:  08/09/19 1755    Respiratory Viral Panel by PCR [facility]; Sputum (BAL ) [[id number]] Collected:  08/09/19 1628    Order Status:  Sent Specimen:  Bronchial Wash Updated:  08/09/19 1754    Gram stain [[id number]] Collected:  08/09/19 1622    Order Status:  Canceled Specimen:  Body Fluid from Lung, RML     Blood culture [[id number]] Collected:  08/04/19 1402    Order Status:  Completed Specimen:  Blood from Peripheral, Wrist, Right Updated:  08/09/19 1612     Blood Culture, Routine No growth after 5 days.    Blood culture [[id number]] Collected:  08/04/19 1400    Order Status:  Completed Specimen:  Blood from Line, Jugular, Internal Right Updated:  08/09/19 1612     Blood Culture, Routine No growth after 5 days.    Culture, Respiratory with Gram Stain [[id number]]  (Abnormal)  (Susceptibility) Collected:  08/04/19 1505    Order Status:  Completed Specimen:  Respiratory from Endotracheal Aspirate Updated:  08/09/19 1444     Respiratory Culture PSEUDOMONAS AERUGINOSA  Moderate        STENOTROPHOMONAS (X.) MALTOPHILIA  Moderate        MORAXELLA (BRANHAMELLA) CATARRHALIS  Many  Beta Lactamase positive       Comment: Previous comment was  modified by YOSELYN at 14:27 on 08/07/2019  Beta Lactamase positive  Normal respiratory delio also present           STAPHYLOCOCCUS AUREUS  Moderate  Normal respiratory delio also present       Gram Stain (Respiratory) <10 epithelial cells per low power field.     Gram Stain (Respiratory) Many Gram negative diplococci     Gram Stain (Respiratory) Many Gram negative cocci     Gram Stain (Respiratory) Few Gram positive cocci     Gram Stain (Respiratory) Few WBC's          Significant Imaging: I have reviewed all pertinent imaging results/findings within the past 24 hours.

## 2019-08-12 NOTE — MEDICAL/APP STUDENT
[facility]  Nephrology  Progress Note    Patient Name: Sisi Medel  MRN: [id number]  Admission Date: 8/3/2019  Hospital Length of Stay: 9 days  Attending Provider: Edward Le*   Primary Care Physician: Carlos A Caputo MD  Principal Problem:Acute on chronic respiratory failure with hypoxia and hypercapnia    Consults  Subjective:     Interval History:   Patient re-intubated post extubation on 8/10, for increased WOB and high RR. SLED witheld yesterday 8/11, with net neutral I/Os targeted. Increasing electrolyte derrangement last 24 hours, with rising Cr and BUN.      Review of systems: Unable to perform, patient intubated.  Review of patient's allergies indicates:   Allergen Reactions    Bactrim [sulfamethoxazole-trimethoprim] Other (See Comments)     Causes renal failure    Nsaids (non-steroidal anti-inflammatory drug) Other (See Comments)     Renal failure     Current Facility-Administered Medications   Medication Frequency    0.9%  NaCl infusion (CRRT USE ONLY) Continuous    acetaminophen tablet 650 mg Q4H PRN    albuterol-ipratropium 2.5 mg-0.5 mg/3 mL nebulizer solution 3 mL Q6H PRN    chlorhexidine 0.12 % solution 15 mL BID    cinacalcet tablet 30 mg Daily with breakfast    clopidogrel tablet 75 mg Daily    dexmedetomidine (PRECEDEX) 400mcg/100mL 0.9% NaCL infusion Continuous    dextrose 10% (D10W) Bolus PRN    dextrose 10% (D10W) Bolus PRN    dextrose 50% injection 25 g PRN    epoetin lily-epbx injection 5,500 Units Every Mon, Wed, Fri    fentaNYL 2500 mcg in 0.9% sodium chloride 250 mL infusion premix (titrating) Continuous    fentaNYL injection 50 mcg Q30 Min PRN    fludrocortisone tablet 100 mcg Daily    glucagon (human recombinant) injection 1 mg PRN    glucose chewable tablet 16 g PRN    glucose chewable tablet 24 g PRN    heparin 25,000 units in dextrose 5% (100 units/ml) IV bolus from bag - ADDITIONAL PRN BOLUS - 30 units/kg PRN    heparin  25,000 units in dextrose 5% (100 units/ml) IV bolus from bag - ADDITIONAL PRN BOLUS - 60 units/kg PRN    heparin 25,000 units in dextrose 5% 250 mL (100 units/mL) infusion LOW INTENSITY nomogram - [facility] Continuous    hydrocortisone sodium succinate injection 100 mg Q8H    insulin aspart U-100 pen 0-5 Units QID (AC + HS) PRN    levETIRAcetam in NaCl (iso-os) IVPB 500 mg Q12H    LORazepam tablet 0.5 mg Q8H PRN    magnesium sulfate 2g in water 50mL IVPB (premix) PRN    meropenem (MERREM) 2 g in sodium chloride 0.9% 100 mL IVPB Q8H    miconazole NITRATE 2 % top powder BID    midodrine tablet 10 mg TID    norepinephrine 16 mg in dextrose 5 % 250 mL infusion Continuous    ondansetron disintegrating tablet 8 mg Q8H PRN    pantoprazole suspension 40 mg Daily    polyethylene glycol packet 17 g Daily    prednisoLONE acetate 1 % ophthalmic suspension 1 drop TID    senna-docusate 8.6-50 mg per tablet 1 tablet BID    sevelamer carbonate tablet 800 mg Daily with dinner    sodium chloride 0.9% flush 10 mL PRN    sodium phosphate 20.01 mmol in dextrose 5 % 250 mL IVPB PRN    sodium phosphate 30 mmol in dextrose 5 % 250 mL IVPB PRN    sodium phosphate 39.99 mmol in dextrose 5 % 250 mL IVPB PRN    sulfamethoxazole-trimethoprim 800-160mg per tablet 1 tablet BID    tiZANidine tablet 4 mg Nightly PRN    vasopressin (PITRESSIN) 0.2 Units/mL in dextrose 5 % 100 mL infusion Continuous     Facility-Administered Medications Ordered in Other Encounters   Medication Frequency    ceFAZolin injection 2 g On Call Procedure    lidocaine (PF) 10 mg/ml (1%) injection 10 mg Once    mupirocin 2 % ointment On Call Procedure    sodium chloride 0.9% flush 10 mL PRN       Objective:     Vital Signs (Most Recent):  Temp: 97.1 °F (36.2 °C) (08/12/19 1300)  Pulse: 74 (08/12/19 1400)  Resp: (!) 24 (08/08/19 0541)  BP: 104/76 (08/12/19 1400)  SpO2: 98 % (08/12/19 1400)  O2 Device (Oxygen Therapy): ventilator (08/12/19 1400) Vital Signs  (24h Range):  Temp:  [97.1 °F (36.2 °C)-98.7 °F (37.1 °C)] 97.1 °F (36.2 °C)  Pulse:  [] 74  SpO2:  [86 %-100 %] 98 %  BP: ()/(52-79) 104/76  Arterial Line BP: ()/(51-70) 123/63     Weight: 109.8 kg (242 lb) (08/11/19 1120)  Body mass index is 36.8 kg/m².  Body surface area is 2.3 meters squared.    I/O last 3 completed shifts:  In: 5276.9 [I.V.:4826.9; NG/GT:350; IV Piggyback:100]  Out: -     Physical Exam   Cardiovascular: Normal rate, regular rhythm, normal heart sounds and intact distal pulses.   No murmur heard.  Pulmonary/Chest: Breath sounds normal. She has no wheezes. She has no rales.   Musculoskeletal: She exhibits edema.   Patient was sedated, intubated and ventilated.    Significant Labs:sure  ABGs:   Recent Labs   Lab 08/12/19  1234   PH 7.263*   PCO2 46.8*   HCO3 21.1*   POCSATURATED 93*   BE -6   .lab  BMP:   Recent Labs   Lab 08/12/19  1357   *      CO2 22*   BUN 21*   CREATININE 1.8*   CALCIUM 8.1*   MG 2.4     LFTs:   Recent Labs   Lab 08/12/19  0302 08/12/19  1357   ALT 18  --    AST 51*  --    ALKPHOS 112  --    BILITOT 1.0  --    PROT 6.5  --    ALBUMIN 2.3*  2.3* 2.2*     All labs within the past 24 hours have been reviewed.    Significant Imaging:  Narrative     EXAMINATION:  XR CHEST 1 VIEW    CLINICAL HISTORY:  dyspnea;    TECHNIQUE:  Single frontal view of the chest was performed.    FINDINGS:  Support devices remain as on previous performed 1 day prior.  There has been mild interval worsening of right basilar opacity.  There is no pneumothorax or pleural fluid.  The remainder of the examination is unchanged.      Impression       As above.         Assessment/Plan:     ESRD on hemodialysis    Plan:  - Will recommence SLED today.  - Goal for net negative 1-2L in next 24 hours.    - hourly -258za  - Strict I/Os and chart  - Will follow closely    I will follow-up with patient. Please contact us if you have any additional questions.    Kyle Gruber  Student  Nephrology  [facility]

## 2019-08-12 NOTE — SUBJECTIVE & OBJECTIVE
Interval History/Significant Events:   No acute events overnight. BP 80s/90s systolic, on levophed and vasopressin with MAP >65. Currently on fentanyl 150mcg for sedation. ABG pH 7.29. PCO2 44.4, bicarb 21.4. Labs significant for hyperkalemia at 5.6. Dialysis per nephrology recommendation.   She is alert and follows commands. Denies any pain at this time.     Review of Systems   Unable to perform ROS: Intubated     Objective:     Vital Signs (Most Recent):  Temp: 97.2 °F (36.2 °C) (08/12/19 1100)  Pulse: 71 (08/12/19 1133)  Resp: (!) 24 (08/08/19 0541)  BP: 95/70 (08/12/19 1133)  SpO2: 98 % (08/12/19 1133) Vital Signs (24h Range):  Temp:  [97.2 °F (36.2 °C)-98.7 °F (37.1 °C)] 97.2 °F (36.2 °C)  Pulse:  [] 71  SpO2:  [86 %-100 %] 98 %  BP: ()/(52-75) 95/70  Arterial Line BP: ()/(51-70) 96/60   Weight: 109.8 kg (242 lb)  Body mass index is 36.8 kg/m².      Intake/Output Summary (Last 24 hours) at 8/12/2019 1300  Last data filed at 8/12/2019 1045  Gross per 24 hour   Intake 2221.6 ml   Output 0 ml   Net 2221.6 ml       Physical Exam   Constitutional: She appears ill. No distress. She is sedated, intubated and restrained.   HENT:   Head: Normocephalic and atraumatic.   Right Ear: External ear normal.   Left Ear: External ear normal.   Eyes: Pupils are equal, round, and reactive to light. Conjunctivae and EOM are normal. Right eye exhibits no discharge. Left eye exhibits no discharge.   Neck: Normal range of motion. Neck supple.   trialysis catheter in place. Central line in place   Cardiovascular: Normal rate, normal heart sounds and intact distal pulses.   No murmur heard.  Pulses:       Radial pulses are 2+ on the right side, and 2+ on the left side.   Pulmonary/Chest: Effort normal. No stridor. She is intubated. No respiratory distress. She has no wheezes. She has rales.   Abdominal: Soft. There is no tenderness.   Musculoskeletal: She exhibits edema (mild). She exhibits no tenderness.    Neurological: She is alert.   Follows basic commands. Intubated currently on fentanyl.   Skin: Skin is warm and dry. She is not diaphoretic. No erythema.   Nursing note and vitals reviewed.      Vents:  Vent Mode: A/C (08/12/19 1133)  Ventilator Initiated: Yes (08/10/19 1742)  Set Rate: 18 bmp (08/12/19 1133)  Vt Set: 400 mL (08/12/19 1133)  Pressure Support: 10 cmH20 (08/12/19 0645)  PEEP/CPAP: 5 cmH20 (08/12/19 1133)  Oxygen Concentration (%): 35 (08/12/19 1133)  Peak Airway Pressure: 28 cmH2O (08/12/19 1133)  Plateau Pressure: 30 cmH20 (08/12/19 1133)  Total Ve: 7.24 mL (08/12/19 1133)  F/VT Ratio<105 (RSBI): (!) 55.43 (08/08/19 0541)  Lines/Drains/Airways     Central Venous Catheter Line                 Trialysis (Dialysis) Catheter 08/04/19 right internal jugular 8 days         Percutaneous Central Line Insertion/Assessment - triple lumen  08/04/19 1803 left internal jugular 7 days          Drain                 Hemodialysis AV Graft Left upper arm -- days         NG/OG Tube 08/11/19 1600 Center mouth less than 1 day          Airway                 Airway - Non-Surgical 08/10/19 1756 Endotracheal Tube 1 day          Arterial Line                 Arterial Line 7 days              Significant Labs:    CBC/Anemia Profile:  Recent Labs   Lab 08/10/19  1954 08/11/19  0340 08/12/19  0302 08/12/19  0714   WBC 49.07* 44.56* 39.70*  --    HGB 9.7* 9.0* 8.1*  --    HCT 34.9* 31.7* 28.8* 30*   * 113* 117*  --    MCV 91 90 91  --    RDW 23.0* 23.3* 23.3*  --         Chemistries:  Recent Labs   Lab 08/10/19  1956  08/11/19  0340 08/11/19  2131 08/12/19  0302      < > 137  137 133* 134*  134*   K 4.7   < > 4.7  4.7 4.8 5.6*  5.6*      < > 104  104 103 101  101   CO2 18*   < > 18*  18* 17* 18*  18*   BUN 10   < > 18  18 28* 30*  30*   CREATININE 0.8   < > 1.2  1.2 2.0* 2.4*  2.4*   CALCIUM 9.0   < > 8.8  8.8 8.3* 8.3*  8.3*   ALBUMIN 2.8*   < > 2.6*  2.6* 2.2* 2.3*  2.3*   PROT 7.5  --   6.8  --  6.5   BILITOT 1.5*  --  1.3*  --  1.0   ALKPHOS 127  --  113  --  112   ALT 16  --  15  --  18   AST 33  --  33  --  51*   MG 2.1   < > 1.9 2.6 2.8*   PHOS 3.7   < > 4.8*  4.8* 5.4* 6.8*  6.8*    < > = values in this interval not displayed.       ABGs:   Recent Labs   Lab 08/12/19  1234   PH 7.263*   PCO2 46.8*   HCO3 21.1*   POCSATURATED 93*   BE -6       Significant Imaging:  I have reviewed all pertinent imaging results/findings within the past 24 hours.

## 2019-08-12 NOTE — ASSESSMENT & PLAN NOTE
58F PMH pulmonary sarcoid on chronic steroids, ESRD on HD presented w/ acute on chronic respiratory failure, now intubated in ICU. ID consulted for management of PNA. ETT culture from 8/4 + MSSA, steno, pseudomonas, and moraxella    Recommendations:  - continue meropenem for coverage of MSSA, PSA and moraxella, and now ESBL kleb oxytoca  - continue bactrim for steno coverage  - recommend 14 day course of meropenem and bactrim    ID will sign off. Please call back if needed.

## 2019-08-12 NOTE — PROGRESS NOTES
Therapy with vancomycin complete and/or consult discontinued by provider.  Pharmacy will sign off, please re-consult as needed.    Tavon Humphrey, PharmD, BCPS  Medical/Surgical ICU Clinical Pharmacist  Spectralink: [id number]

## 2019-08-12 NOTE — PLAN OF CARE
Problem: Adult Inpatient Plan of Care  Goal: Plan of Care Review  Outcome: Ongoing (interventions implemented as appropriate)  HR 60's-70's paced. MAPs maintained >65. Sats 90% and above on AC 35%, 5 PEEP. Afebrile. Heparin, levo ,vaso, fentanyl gtts infusing. Last CVP 9.  Tf at 30 with goal of 40.  Pt follows commands. Labs discussed with Dr. Thompson of Nephrology. Plan of care reviewed, WCTM.

## 2019-08-12 NOTE — SUBJECTIVE & OBJECTIVE
Interval History: remains intubated on pressors, awake on vent. Discussed w/ mother and sister at bedside    Review of Systems   Unable to perform ROS: Intubated   Objective:     Vital Signs (Most Recent):  Temp: 97.1 °F (36.2 °C) (08/12/19 1300)  Pulse: 74 (08/12/19 1400)  Resp: (!) 24 (08/08/19 0541)  BP: 104/76 (08/12/19 1400)  SpO2: 98 % (08/12/19 1400) Vital Signs (24h Range):  Temp:  [97.1 °F (36.2 °C)-98.7 °F (37.1 °C)] 97.1 °F (36.2 °C)  Pulse:  [] 74  SpO2:  [86 %-100 %] 98 %  BP: ()/(52-79) 104/76  Arterial Line BP: ()/(51-70) 123/63     Weight: 109.8 kg (242 lb)  Body mass index is 36.8 kg/m².    Estimated Creatinine Clearance: 44.8 mL/min (A) (based on SCr of 1.8 mg/dL (H)).    Physical Exam   Constitutional: She is oriented to person, place, and time. She appears well-developed and well-nourished. No distress.   HENT:   Right Ear: External ear normal.   Left Ear: External ear normal.   Nose: Nose normal.   Eyes: Conjunctivae and EOM are normal.   Neck: Normal range of motion. Neck supple.   Cardiovascular: Normal rate, regular rhythm, normal heart sounds and intact distal pulses.   No murmur heard.  Pulmonary/Chest: Effort normal and breath sounds normal. No respiratory distress. She has no wheezes.   intubated   Abdominal: Soft. Bowel sounds are normal. She exhibits no distension. There is no tenderness.   Musculoskeletal: Normal range of motion. She exhibits no edema.   Neurological: She is alert and oriented to person, place, and time. No cranial nerve deficit. Coordination normal.   Skin: Skin is warm and dry. No rash noted. She is not diaphoretic. No erythema.   Psychiatric: She has a normal mood and affect. Her behavior is normal.   Vitals reviewed.      Significant Labs:   CBC:   Recent Labs   Lab 08/10/19  1954 08/11/19  0340 08/12/19  0302 08/12/19  0714   WBC 49.07* 44.56* 39.70*  --    HGB 9.7* 9.0* 8.1*  --    HCT 34.9* 31.7* 28.8* 30*   * 113* 117*  --      CMP:    Recent Labs   Lab 08/10/19  1956  08/11/19  0340 08/11/19  2131 08/12/19  0302 08/12/19  1357      < > 137  137 133* 134*  134* 135*   K 4.7   < > 4.7  4.7 4.8 5.6*  5.6* 4.8      < > 104  104 103 101  101 103   CO2 18*   < > 18*  18* 17* 18*  18* 22*   *   < > 267*  267* 232* 208*  208* 205*   BUN 10   < > 18  18 28* 30*  30* 21*   CREATININE 0.8   < > 1.2  1.2 2.0* 2.4*  2.4* 1.8*   CALCIUM 9.0   < > 8.8  8.8 8.3* 8.3*  8.3* 8.1*   PROT 7.5  --  6.8  --  6.5  --    ALBUMIN 2.8*   < > 2.6*  2.6* 2.2* 2.3*  2.3* 2.2*   BILITOT 1.5*  --  1.3*  --  1.0  --    ALKPHOS 127  --  113  --  112  --    AST 33  --  33  --  51*  --    ALT 16  --  15  --  18  --    ANIONGAP 17*   < > 15  15 13 15  15 10   EGFRNONAA >60.0   < > 50.3*  50.3* 27.1* 21.8*  21.8* 30.8*    < > = values in this interval not displayed.     Microbiology Results (last 7 days)     Procedure Component Value Units Date/Time    Culture, Respiratory with Gram Stain [[id number]]  (Abnormal) Collected:  08/10/19 2144    Order Status:  Completed Specimen:  Respiratory from Endotracheal Aspirate Updated:  08/12/19 1112     Respiratory Culture KLEBSIELLA OXYTOCA  Moderate  Susceptibility pending       Gram Stain (Respiratory) No WBC's     Gram Stain (Respiratory) No organisms seen    Culture, Respiratory [[id number]]  (Abnormal)  (Susceptibility) Collected:  08/09/19 1622    Order Status:  Completed Specimen:  Respiratory from BAL, RML Updated:  08/12/19 0734     Respiratory Culture No S aureus isolated.      KLEBSIELLA OXYTOCA ESBL  Moderate  Normal respiratory delio also present       Gram Stain (Respiratory) <10 epithelial cells per low power field.     Gram Stain (Respiratory) Moderate WBC's     Gram Stain (Respiratory) Rare Gram positive cocci     Gram Stain (Respiratory) Rare Gram negative rods    Narrative:       Bronchial Wash    Blood culture [[id number]] Collected:  08/10/19 2043    Order Status:  Completed  Specimen:  Blood from Line, Arterial, Right Updated:  08/11/19 2212     Blood Culture, Routine No Growth to date      No Growth to date    Blood culture [[id number]] Collected:  08/10/19 2130    Order Status:  Completed Specimen:  Blood from Peripheral, Antecubital, Right Updated:  08/11/19 2212     Blood Culture, Routine No Growth to date      No Growth to date    AFB Culture & Smear [[id number]] Collected:  08/09/19 1622    Order Status:  Completed Specimen:  Body Fluid from Lung, RML Updated:  08/10/19 2127     AFB Culture & Smear Culture in progress     AFB CULTURE STAIN No acid fast bacilli seen.    Narrative:       Bronchial Wash    Fungus culture [[id number]] Collected:  08/09/19 1622    Order Status:  No result Specimen:  Bronchial Alveolar Lavage Updated:  08/10/19 1752    Fungus culture [[id number]]     Order Status:  Completed Specimen:  Respiratory from BAL, RLL     Fungus culture [[id number]] Collected:  08/09/19 1622    Order Status:  Sent Specimen:  Body Fluid from Lung, RML Updated:  08/09/19 1755    Respiratory Viral Panel by PCR [facility]; Sputum (BAL ) [[id number]] Collected:  08/09/19 1628    Order Status:  Sent Specimen:  Bronchial Wash Updated:  08/09/19 1754    Gram stain [[id number]] Collected:  08/09/19 1622    Order Status:  Canceled Specimen:  Body Fluid from Lung, RML     Blood culture [[id number]] Collected:  08/04/19 1402    Order Status:  Completed Specimen:  Blood from Peripheral, Wrist, Right Updated:  08/09/19 1612     Blood Culture, Routine No growth after 5 days.    Blood culture [[id number]] Collected:  08/04/19 1400    Order Status:  Completed Specimen:  Blood from Line, Jugular, Internal Right Updated:  08/09/19 1612     Blood Culture, Routine No growth after 5 days.    Culture, Respiratory with Gram Stain [[id number]]  (Abnormal)  (Susceptibility) Collected:  08/04/19 1505    Order Status:  Completed Specimen:  Respiratory from Endotracheal Aspirate Updated:  08/09/19 1444     Respiratory  Culture PSEUDOMONAS AERUGINOSA  Moderate        STENOTROPHOMONAS (X.) MALTOPHILIA  Moderate        MORAXELLA (BRANHAMELLA) CATARRHALIS  Many  Beta Lactamase positive       Comment: Previous comment was modified by YOSELYN at 14:27 on 08/07/2019  Beta Lactamase positive  Normal respiratory delio also present           STAPHYLOCOCCUS AUREUS  Moderate  Normal respiratory delio also present       Gram Stain (Respiratory) <10 epithelial cells per low power field.     Gram Stain (Respiratory) Many Gram negative diplococci     Gram Stain (Respiratory) Many Gram negative cocci     Gram Stain (Respiratory) Few Gram positive cocci     Gram Stain (Respiratory) Few WBC's          Significant Imaging: I have reviewed all pertinent imaging results/findings within the past 24 hours.

## 2019-08-12 NOTE — PROGRESS NOTES
[facility]  Critical Care Medicine  Progress Note    Patient Name: Sisi Medel  MRN: [id number]  Admission Date: 8/3/2019  Hospital Length of Stay: 9 days  Code Status: Full Code  Attending Provider: Edward Le*  Primary Care Provider: Carlos A Caputo MD   Principal Problem: Acute on chronic respiratory failure with hypoxia and hypercapnia    Subjective:     HPI:  Ms Medel  59 yo f with ESRD on HD, cervical radiculopathy, CHF, Chronic resp failure with hypoxia, T2DM, DDD, paroxysmal Afib, seizure, shingles, thyroid disorder here for a 3 day history of cough and SOB. Patient was at dialysis and had to stop at 1.6L due to resp distress. She was put in a nonrebreather at 12L of oxygen. She was transported via EMS. Overnight she required BiPAP for worsening SOB. Rapid response was called this morning because she was hypotensive 70s/50s. Dr. Rosas, MICU team, went and recheck BP was 107/59. She later became hypertensive again and continued to be on BiPAP, she was stepped up for dialysis, BP monitoring, possible vasopressor requirement and further respiratory monitoring.     During step up she was persistently hypoglycemic and hypotensive, peripheral vascular access was lost and a left TLC was placed at bedside. It was determined she could not tolerated her normal HD and she had tenuous respiratory status, so decision was made to intubate. She has been started on broad spectrum abx, and right HD IJ catheter was placed. Nephro been made aware and SCUF planned for today.     Hospital/ICU Course:  08/04 Stepped up to ICU, intubated, started on broad spectrum abx, HD catheter placed, plan is for SCUF today.   08/05 SCUF started in the AM, levo requirement of 0.06. Still intubated. Plan to transition to SLED. Blood cultures negative to date, covered with Vanc and Zosyn   08/06 On SLED today, had increase of pressor requirement was at 0.13 levo and .04 vaso, increase white count  13.3 and temp overnight 100.4. Sputum culture grew pseudomonas, d/c vancomycin, plan is to transition to SCUF.   08/07 Patient continued on levophed and vasopressin, with decreasing levophed requirements. Continued on zosyn for pseudomonas growing in respiratory cultures. SAT attempted this morning with significant tachypnea and agitation. Propofol reinitiated at prior rate, fentanyl at 75. Patient has corneal reflexes, spontaneously opens eyes but mental status is not at baseline - EEG and CTH ordered. CTH unremarkble, EEG performed but not yet read. Mental status improving, leukocytosis worsening but no evidence of infection. Likely related to stress dose steroids.   08/09 EEG result c/w encephalopathy; passed SAT; SBT: RSBI 88, however hypotensive with SBT and tachypneic to 35   08/10 holding tube feeds, SLED, on zosyn and bactrim for PNA and long term steroid use, and attempted extubation today. Re-intubated for increased WOB and high RR  08/11: Increasing levophed requirements, stable troponin, a-line placed overnight, respiratory and BCx repeated, started on vancomycin. Remains intubated. Restarted tube feeds. Aiming for net neutral output, stopped SLED          Interval History/Significant Events:   No acute events overnight. BP 80s/90s systolic, on levophed and vasopressin with MAP >65. Currently on fentanyl 150mcg for sedation. ABG pH 7.29. PCO2 44.4, bicarb 21.4. Labs significant for hyperkalemia at 5.6. Dialysis per nephrology recommendation.   She is alert and follows commands. Denies any pain at this time.     Review of Systems   Unable to perform ROS: Intubated     Objective:     Vital Signs (Most Recent):  Temp: 97.2 °F (36.2 °C) (08/12/19 1100)  Pulse: 71 (08/12/19 1133)  Resp: (!) 24 (08/08/19 0541)  BP: 95/70 (08/12/19 1133)  SpO2: 98 % (08/12/19 1133) Vital Signs (24h Range):  Temp:  [97.2 °F (36.2 °C)-98.7 °F (37.1 °C)] 97.2 °F (36.2 °C)  Pulse:  [] 71  SpO2:  [86 %-100 %] 98 %  BP:  ()/(52-75) 95/70  Arterial Line BP: ()/(51-70) 96/60   Weight: 109.8 kg (242 lb)  Body mass index is 36.8 kg/m².      Intake/Output Summary (Last 24 hours) at 8/12/2019 1300  Last data filed at 8/12/2019 1045  Gross per 24 hour   Intake 2221.6 ml   Output 0 ml   Net 2221.6 ml       Physical Exam   Constitutional: She appears ill. No distress. She is sedated, intubated and restrained.   HENT:   Head: Normocephalic and atraumatic.   Right Ear: External ear normal.   Left Ear: External ear normal.   Eyes: Pupils are equal, round, and reactive to light. Conjunctivae and EOM are normal. Right eye exhibits no discharge. Left eye exhibits no discharge.   Neck: Normal range of motion. Neck supple.   trialysis catheter in place. Central line in place   Cardiovascular: Normal rate, normal heart sounds and intact distal pulses.   No murmur heard.  Pulses:       Radial pulses are 2+ on the right side, and 2+ on the left side.   Pulmonary/Chest: Effort normal. No stridor. She is intubated. No respiratory distress. She has no wheezes. She has rales.   Abdominal: Soft. There is no tenderness.   Musculoskeletal: She exhibits edema (mild). She exhibits no tenderness.   Neurological: She is alert.   Follows basic commands. Intubated currently on fentanyl.   Skin: Skin is warm and dry. She is not diaphoretic. No erythema.   Nursing note and vitals reviewed.      Vents:  Vent Mode: A/C (08/12/19 1133)  Ventilator Initiated: Yes (08/10/19 1742)  Set Rate: 18 bmp (08/12/19 1133)  Vt Set: 400 mL (08/12/19 1133)  Pressure Support: 10 cmH20 (08/12/19 0645)  PEEP/CPAP: 5 cmH20 (08/12/19 1133)  Oxygen Concentration (%): 35 (08/12/19 1133)  Peak Airway Pressure: 28 cmH2O (08/12/19 1133)  Plateau Pressure: 30 cmH20 (08/12/19 1133)  Total Ve: 7.24 mL (08/12/19 1133)  F/VT Ratio<105 (RSBI): (!) 55.43 (08/08/19 0541)  Lines/Drains/Airways     Central Venous Catheter Line                 Trialysis (Dialysis) Catheter 08/04/19 right  internal jugular 8 days         Percutaneous Central Line Insertion/Assessment - triple lumen  08/04/19 1803 left internal jugular 7 days          Drain                 Hemodialysis AV Graft Left upper arm -- days         NG/OG Tube 08/11/19 1600 Center mouth less than 1 day          Airway                 Airway - Non-Surgical 08/10/19 1756 Endotracheal Tube 1 day          Arterial Line                 Arterial Line 7 days              Significant Labs:    CBC/Anemia Profile:  Recent Labs   Lab 08/10/19  1954 08/11/19  0340 08/12/19  0302 08/12/19  0714   WBC 49.07* 44.56* 39.70*  --    HGB 9.7* 9.0* 8.1*  --    HCT 34.9* 31.7* 28.8* 30*   * 113* 117*  --    MCV 91 90 91  --    RDW 23.0* 23.3* 23.3*  --         Chemistries:  Recent Labs   Lab 08/10/19  1956  08/11/19  0340 08/11/19  2131 08/12/19  0302      < > 137  137 133* 134*  134*   K 4.7   < > 4.7  4.7 4.8 5.6*  5.6*      < > 104  104 103 101  101   CO2 18*   < > 18*  18* 17* 18*  18*   BUN 10   < > 18  18 28* 30*  30*   CREATININE 0.8   < > 1.2  1.2 2.0* 2.4*  2.4*   CALCIUM 9.0   < > 8.8  8.8 8.3* 8.3*  8.3*   ALBUMIN 2.8*   < > 2.6*  2.6* 2.2* 2.3*  2.3*   PROT 7.5  --  6.8  --  6.5   BILITOT 1.5*  --  1.3*  --  1.0   ALKPHOS 127  --  113  --  112   ALT 16  --  15  --  18   AST 33  --  33  --  51*   MG 2.1   < > 1.9 2.6 2.8*   PHOS 3.7   < > 4.8*  4.8* 5.4* 6.8*  6.8*    < > = values in this interval not displayed.       ABGs:   Recent Labs   Lab 08/12/19  1234   PH 7.263*   PCO2 46.8*   HCO3 21.1*   POCSATURATED 93*   BE -6       Significant Imaging:  I have reviewed all pertinent imaging results/findings within the past 24 hours.       ABG  Recent Labs   Lab 08/12/19  1234   PH 7.263*   PO2 76*   PCO2 46.8*   HCO3 21.1*   BE -6     Assessment/Plan:     Neuro  Encephalopathy  Mental status improving. Currently on fentanyl 150mcg for sedation  - Add precedex in order to decrease fentanyl dosage. Precedex previously d/c  due to frequent PVC's however pressor requirements at that time was also high. Now that levophed dosage has decreased, will restart precedex.   - CTH unremarkable, EEG 8/7 showed encephalopathy    Epilepsy  Continue keppra. EEG showed encephalopathy.     Pulmonary  * Acute on chronic respiratory failure with hypoxia and hypercapnia  Hx of sarcoidosis, CHF, ESRD, and recent PNA.  -Coarse breath sounds L>R, remains intubated, attempted extubated on 8/10, but re-intubated for increased WOB, tachypneia and expected clinical course  - continue on meropenem for klebsiella infection  - continue bactrim for PJP prophylaxis  - Antibiotics per ID recs  - continue CRRT for net negative volume loss        Pneumonia  Course lung sounds and CXR concerning for PNA in LLL with interstitial fullness with leukocytosis without fevers. Respiratory cultures positive.    - ETT culture from 8/4 + MSSA, PSA and moraxella   - continue bactrim for PJP prophylaxis w/ steroid use   - respiratory cx positive for ESBL klebsiella oxytoca - on meropenem   - f/u fungitell and PJP PCR from bronch on 8/8    - f/u blood cultures   - trend WBC (currently downtrending)   - ID following      Pulmonary hypertension  Likely Group 2 pHTN related to HFrEF. Continue on dialysis     TTE (8/6/2019): PASP of 50        Cardiac/Vascular  Permanent atrial fibrillation  Patient presents with A fib in the ED. Patient is on chronic warfarin. S/P ablation multiple times.   Plan  -Continue heparin gtt  -Pacemaker in place       Acute on chronic systolic congestive heart failure  Patient has hx of HF (EF 35%). CXR in the ED suggestive of HF. BNP 1424 and troponins 0.156.  Patient is unsure if she takes her lisinopril at home.  - repeat TTE EF 25% on 8/11, LVH, G3DD, LAE/JAGUAR, moderate RV systolic dysfunction   - Continue CRRT with plan for net neutral I/O today  - Increasing pressor requirements    Hypotension  On 10 mg midodrine TID at home. Currently on levophed 0.1 and  vaso 0.4 based on a-line. On fentanyl drip, will restart precedex.   - Continue midodrine  - levophed/vaso - titrate to map 65  - Will monitor closely as patient restarts dialysis today.       Renal/  ESRD on hemodialysis  - continue CRRT for goal of net neutral I/O, CXR improving, vasopressor stable  - appreciate nephro assistance      Immunology/Multi System  Pulmonary sarcoidosis  Stress dose steroids while in septic shock. Started on bactrim per ID with chronic steroid use    Hematology  Current use of long term anticoagulation  - heparin gtt while IP  - no evidence of hepatic dysfunction on labs - minor bili bump      GI  Constipation  On senna and colace    Other  Class 2 severe obesity due to excess calories with serious comorbidity and body mass index (BMI) of 38.0 to 38.9 in adult  Will need to revisit weight loss when improving  Significant pannus    Hypoalbuminemia  Currently on tube feeds.  - At 30 with a goal of 40.  - On renal novasource     Current chronic use of systemic steroids  - no evidence of PJP ppx on MAR  - on stress dose during septic shock   - On bactrim for PJP prophylaxis    Counseling regarding goals of care  Attempted to discuss goals of care with family on 8/10. Patient's adult children would not like to have this discussion at this time. Patient appears comfortable on vent. Will revisit goals of care discussion with family.       Critical Care Daily Checklist:    A: Awake: RASS Goal/Actual Goal: RASS Goal: -1-->drowsy  Actual: Horton Agitation Sedation Scale (RASS): Drowsy   B: Spontaneous Breathing Trial Performed? Spon. Breathing Trial Initiated?: Initiated (08/10/19 0800)   C: SAT & SBT Coordinated?  Attempted - Failed                    D: Delirium: CAM-ICU Overall CAM-ICU: Positive   E: Early Mobility Performed? No   F: Feeding Goal: Goals: Patient to meet > 85% EEN and EPN by RD follow-up  Status: Nutrition Goal Status: goal not met   Current Diet Order   Procedures    Diet  NPO Except for: Medication     Order Specific Question:   Except for     Answer:   Medication      AS: Analgesia/Sedation Fentanyl gtt and precedex    T: Thromboembolic Prophylaxis Heparin gtt   H: HOB > 300 Yes   U: Stress Ulcer Prophylaxis (if needed) pantoprazole2   G: Glucose Control SSI   B: Bowel Function     I: Indwelling Catheter (Lines & Yepez) Necessity Left IJ TLC, right IJ trialysis, A-line, NG   D: De-escalation of Antimicrobials/Pharmacotherapies As indicated by clinical course    Plan for the day/ETD Monitor volume status, wean pressor requirements as tolerated,    Code Status:  Family/Goals of Care: Full Code  n/a       Critical secondary to Patient has a condition that poses threat to life and bodily function: Severe Respiratory Distress      Critical care was time spent personally by me on the following activities: development of treatment plan with patient or surrogate and bedside caregivers, discussions with consultants, evaluation of patient's response to treatment, examination of patient, ordering and performing treatments and interventions, ordering and review of laboratory studies, ordering and review of radiographic studies, pulse oximetry, re-evaluation of patient's condition. This critical care time did not overlap with that of any other provider or involve time for any procedures.     Elisabeth Munoz MD  Critical Care Medicine  [facility]

## 2019-08-12 NOTE — ASSESSMENT & PLAN NOTE
On 10 mg midodrine TID at home. Currently on levophed 0.1 and vaso 0.4 based on a-line. On fentanyl drip, will restart precedex.   - Continue midodrine  - levophed/vaso - titrate to map 65  - Will monitor closely as patient restarts dialysis today.

## 2019-08-12 NOTE — ASSESSMENT & PLAN NOTE
Patient presents with A fib in the ED. Patient is on chronic warfarin. S/P ablation multiple times.   Plan  -Continue heparin gtt  -Pacemaker in place

## 2019-08-13 NOTE — ASSESSMENT & PLAN NOTE
Course lung sounds and CXR concerning for PNA in LLL with interstitial fullness with leukocytosis without fevers. Respiratory cultures positive.    - ETT culture from 8/4 + MSSA, PSA and moraxella   - continue bactrim for PJP prophylaxis w/ steroid use   - respiratory cx positive for ESBL klebsiella oxytoca - on meropenem   - f/u fungitell and PJP PCR from bronch on 8/8    - Blood cultured no growth to date   - trend WBC (currently downtrending)   - ID following

## 2019-08-13 NOTE — PROCEDURES
"Sisi Medel is a 58 y.o. female patient.    Temp: 97 °F (36.1 °C) (19 1508)  Pulse: 70 (19 1508)  Resp: (!) 24 (19 0541)  BP: 93/63 (19 1508)  SpO2: 97 % (19 1508)  Weight: 107.8 kg (237 lb 10.5 oz) (19 0400)  Height: 5' 8" (172.7 cm) (19 1120)       Arterial Line  Date/Time: 2019 4:42 PM  Location procedure was performed: [facility]  Performed by: Jaja Weeks MD  Authorized by: Jaja Weeks MD   Assisting provider: Elisabeth Munoz MD  Pre-op Diagnosis: Acute on Chronic Respiratory Distress  Post-operative diagnosis: Acute on Chronic Respiratory Distress  Consent Done: Yes  Consent: Written consent obtained.  Risks and benefits: risks, benefits and alternatives were discussed  Consent given by: mother  Relevant documents: relevant documents present and verified  Site marked: the operative site was marked  Patient identity confirmed: , MRN and name  Time out: Immediately prior to procedure a "time out" was called to verify the correct patient, procedure, equipment, support staff and site/side marked as required.  Preparation: Patient was prepped and draped in the usual sterile fashion.  Indications: multiple ABGs, respiratory failure and hemodynamic monitoring  Location: right femoral  Anesthesia: local infiltration    Anesthesia:  Local Anesthetic: lidocaine 1% without epinephrine  Anesthetic total: 2 mL  Needle gauge: 18  Seldinger technique: Seldinger technique used  Number of attempts: 1  Complications: No  Estimated blood loss (mL): 2  Specimens: No  Implants: No  Post-procedure: line sutured and dressing applied  Post-procedure CMS: unchanged  Patient tolerance: Patient tolerated the procedure well with no immediate complications          Jaja Weeks  2019  "

## 2019-08-13 NOTE — MEDICAL/APP STUDENT
Patient Name: Sisi Medel  MRN: [id number]  Admission Date: 8/3/2019  Hospital Length of Stay: 9 days  Attending Provider: Edward Le*   Primary Care Physician: Carlos A Caputo MD  Principal Problem:Acute on chronic respiratory failure with hypoxia and hypercapnia    Subjective:      HPI:  Ms Medel  57 yo f with ESRD on HD, cervical radiculopathy, CHF, Chronic resp failure with hypoxia, T2DM, DDD, paroxysmal Afib, seizure, shingles, thyroid disorder here for a 3 day history of cough and SOB. Patient was at dialysis and had to stop at 1.6L due to resp distress. She was put in a nonrebreather at 12L of oxygen. She was transported via EMS. Overnight she required BiPAP for worsening SOB. Rapid response was called this morning because she was hypotensive 70s/50s. Dr. Rosas, MICU team, went and recheck BP was 107/59. She later became hypertensive again and continued to be on BiPAP, she was stepped up for dialysis, BP monitoring, possible vasopressor requirement and further respiratory monitoring.      During step up she was persistently hypoglycemic and hypotensive, peripheral vascular access was lost and a left TLC was placed at bedside. It was determined she could not tolerated her normal HD and she had tenuous respiratory status, so decision was made to intubate. She has been started on broad spectrum abx, and right HD IJ catheter was placed. Nephro been made aware and SCUF planned for today.     Interval History:  Patient remained intubated overnight on AC/VC, FiO2 35%, 5-peep, RR18, with SpO2 >92%.   On Fentanyl, precedex, vasopressin, levophed, heparin.   Last night, CRRT low was sub-optimal. Thought to be positional. Fluid bolus and thrombolysis did not improve flow. CRRT was stopped and new dialysis catheter had to be placed, in right IJV, double lumen. SLED was recommenced before the end of morning shift, at UF 400mL/hr, diasylate 200mL/hr.    Review of Systems   Unable to perform ROS:  Intubated       Objective:      Physical Exam   Constitutional: She is intubated.   Cardiovascular: Normal rate, regular rhythm, normal heart sounds and intact distal pulses.   No murmur heard.  Pulmonary/Chest: She is intubated. She has no decreased breath sounds. She has rales.   Musculoskeletal: She exhibits edema.    Mild peripheral oedema to proximal shins    I/O last shift:  In: 2689.7 [I.V.:2139.7; NG/GT:450; IV Piggyback:100]  Out: - 547 [SLED 547]    Vitals:    08/13/19 1100 08/13/19 1117 08/13/19 1200 08/13/19 1300   BP: 117/79  98/66 104/69   BP Location: Right arm  Right arm Right arm   Patient Position: Lying  Lying Lying   Pulse: 74 74 70 72   Resp:       Temp: 97.9 °F (36.6 °C) 97.7 °F (36.5 °C)  97 °F (36.1 °C)   TempSrc: Core Esophageal      SpO2: 99% 99% 98% 98%   Weight:       Height:         Important labs:  Sodium   Date Value Ref Range Status   08/13/2019 136 136 - 145 mmol/L Final   08/13/2019 136 136 - 145 mmol/L Final     Potassium   Date Value Ref Range Status   08/13/2019 5.5 (H) 3.5 - 5.1 mmol/L Final     Comment:     *No Visible Hemolysis   08/13/2019 5.5 (H) 3.5 - 5.1 mmol/L Final     Comment:     *No Visible Hemolysis     Chloride   Date Value Ref Range Status   08/13/2019 105 95 - 110 mmol/L Final   08/13/2019 105 95 - 110 mmol/L Final     CO2   Date Value Ref Range Status   08/13/2019 21 (L) 23 - 29 mmol/L Final   08/13/2019 21 (L) 23 - 29 mmol/L Final     BUN, Bld   Date Value Ref Range Status   08/13/2019 23 (H) 6 - 20 mg/dL Final   08/13/2019 23 (H) 6 - 20 mg/dL Final     Creatinine   Date Value Ref Range Status   08/13/2019 2.0 (H) 0.5 - 1.4 mg/dL Final   08/13/2019 2.0 (H) 0.5 - 1.4 mg/dL Final     Calcium   Date Value Ref Range Status   08/13/2019 8.0 (L) 8.7 - 10.5 mg/dL Final   08/13/2019 8.0 (L) 8.7 - 10.5 mg/dL Final     RBC   Date Value Ref Range Status   08/13/2019 2.92 (L) 4.00 - 5.40 M/uL Final     Platelets   Date Value Ref Range Status   08/13/2019 114 (L) 150 - 350  "K/uL Final     POC PH   Date Value Ref Range Status   08/13/2019 7.379 7.35 - 7.45 Final     Important Imaging:               Narrative     EXAMINATION:  XR CHEST AP PORTABLE    CLINICAL HISTORY:  Provided history is "central venous cath placement;  ".    TECHNIQUE:  One view of the chest.    COMPARISON:  08/12/2019.    FINDINGS:  Patient is significantly rotated.  Multiple cardiac wires and support devices overlie the chest and abdomen.  Endotracheal tube is partially obscured but appears to terminate at the level of the clavicular heads left-sided central venous catheter overlies the upper SVC.  Right-sided central venous catheter is predominantly obscured by additional overlying support devices but appears to terminate over the upper SVC as well.  Cardiac silhouette is enlarged but stable.  Lungs demonstrate bilateral airspace disease similar to prior examination.  Vascular stent overlies the left axillary region.  Nasogastric tube overlies the stomach.      Impression       New right-sided central venous catheter is not well characterized secondary to overlying support devices and patient rotation, but likely terminates over the upper SVC.    No detrimental change.       Assessment:   ESRD on hemodialysis    Fluid overload and electrolyte derangement due to interruption to CRRT overnight.    Plan:     Plan:  - Continue SLED today  - Goal for net negative 1-1.2L in next 24 hours.    - hourly UF 400cc, diasylate 200cc, K+ bath to 3.0  - Strict I/Os and chart  - Will follow closely     I will follow-up with patient. Please contact us if you have any additional questions.  "

## 2019-08-13 NOTE — ASSESSMENT & PLAN NOTE
Mental status improving. Currently on precedex and fentanyl for sedation  - Add precedex in order to decrease fentanyl dosage. Precedex previously d/c due to frequent PVC's however pressor requirements at that time was also high. Now that levophed dosage has decreased, will restart precedex.   - CTH unremarkable, EEG 8/7 showed encephalopathy

## 2019-08-13 NOTE — PLAN OF CARE
CM discussed patient D/C POC needs with CCM team in IDT rounds. Per CCM team, patient is not medically stable for stepdown at this time.  Patient requires continued monitoring and interventions from CCM team for ventilator management, CRRT and pressor support. POC remains to be determined at this time. CM remains available for any further patient/family needs or concerns.        08/13/19 1611   Discharge Reassessment   Assessment Type Discharge Planning Reassessment   Provided patient/caregiver education on the expected discharge date and the discharge plan No   Do you have any problems affording any of your prescribed medications? No   Discharge Plan A Long-term acute care facility (LTAC)   Discharge Plan B Skilled Nursing Facility   DME Needed Upon Discharge  other (see comments)  (TBD)       Vanessa Wharton RN, [place]  Case Management-Critical Care     [phone]

## 2019-08-13 NOTE — PROGRESS NOTES
"[facility]  Adult Nutrition  Progress Note    SUMMARY       Recommendations  Recommendation/Intervention:   1. Recommend increasing TF to Peptamen Intense VHP at a goal rate of 55 mL/hr - meets needs.   2. When able to extubate, ADAT to Renal with texture per SLP.   RD to monitor.    Goals: Patient to meet > 85% EEN and EPN by RD follow-up  Nutrition Goal Status: progressing towards goal  Communication of RD Recs: (POC)    Reason for Assessment  Reason For Assessment: RD follow-up  Diagnosis: (respiratory failure)  Relevant Medical History: CHF, DM2, ESRD on HD, seizures  General Information Comments: Still intubated, sedated. Extuabted 8/10 but reintubated that night. CRRT on hold. On TF, currently at 30. Spoke with RN regarding 2 TF orders, he clarified with RD and plan to increase to goal rate slowly. Physical exam remains unchanged, continues to appear nourished.  Nutrition Discharge Planning: Unable to determine at this time.    Nutrition Risk Screen  Nutrition Risk Screen: tube feeding or parenteral nutrition    Nutrition/Diet History  Spiritual, Cultural Beliefs, Hoahaoism Practices, Values that Affect Care: no  Factors Affecting Nutritional Intake: NPO, on mechanical ventilation    Anthropometrics  Temp: 97.7 °F (36.5 °C)  Height Method: Stated  Height: 5' 8" (172.7 cm)  Height (inches): 68 in  Weight Method: Bed Scale  Weight: 107.8 kg (237 lb 10.5 oz)  Weight (lb): 237.66 lb  Ideal Body Weight (IBW), Female: 140 lb  % Ideal Body Weight, Female (lb): 172.86 lb  BMI (Calculated): 36.9  BMI Grade: 35 - 39.9 - obesity - grade II    Lab/Procedures/Meds  Pertinent Labs Reviewed: reviewed  Pertinent Labs Comments: K 5.5, BUN 23, Creat 2.0, Glu 273, POCT Glu 205-300, HgbA1c 6.1, Ca 8.0, Phos 5.1, Alb 2.1  Pertinent Medications Reviewed: reviewed  Pertinent Medications Comments: pantoprazole, senna-docusate, precedex, fentnayl, levophed, vasopressin    Estimated/Assessed Needs  Weight Used For " Calorie Calculations: 110 kg (242 lb 8.1 oz)  Energy Calorie Requirements (kcal): 1540 kcal/day  Energy Need Method: Kcal/kg(14)  Protein Requirements: 118 g/day(2.0 g/kg)  Weight Used For Protein Calculations: 59.1 kg (130 lb 4.7 oz)(IBW)  Fluid Requirements (mL): 1 mL/kcal or per MD  Estimated Fluid Requirement Method: RDA Method  RDA Method (mL): 1540  CHO Requirement: 50% total kcal    Nutrition Prescription Ordered  Current Diet Order: NPO  Current Nutrition Support Formula Ordered: Peptamen Intense VHP  Current Nutrition Support Rate Ordered: 55 (ml)  Current Nutrition Support Frequency Ordered: mL/hr    Evaluation of Received Nutrient/Fluid Intake  Enteral Calories (kcal): 1320  Enteral Protein (gm): 121  Enteral (Free Water) Fluid (mL): 1109  % Kcal Needs: 86%  % Protein Needs: 103%  I/O: +1.7L x 24hrs, -2.3L since admit  Energy Calories Required: meeting needs  Protein Required: meeting needs  Fluid Required: (per MD)  Comments: LBM 8/2  Tolerance: tolerating(at 30 mL/hr)  % Intake of Estimated Energy Needs: 75 - 100 %  % Meal Intake: NPO    Nutrition Risk  Level of Risk/Frequency of Follow-up: high(2x/week)     Assessment and Plan  Nutrition Problem  Inadequate energy intake     Related to (etiology):   Decreased ability to consume sufficient energy     Signs and Symptoms (as evidenced by):   NPO with and TF providing < 85% EEN and EPN     Interventions/Recommendations (treatment strategy):  Collaboration of nutrition care with other providers     Nutrition Diagnosis Status:   Improving - advancing to goal rate    Monitor and Evaluation  Food and Nutrient Intake: energy intake, enteral nutrition intake  Food and Nutrient Adminstration: enteral and parenteral nutrition administration  Anthropometric Measurements: weight, weight change  Biochemical Data, Medical Tests and Procedures: electrolyte and renal panel, gastrointestinal profile, glucose/endocrine profile, inflammatory profile  Nutrition-Focused  Physical Findings: overall appearance     Nutrition Follow-Up  RD Follow-up?: Yes

## 2019-08-13 NOTE — ASSESSMENT & PLAN NOTE
Hx of sarcoidosis, CHF, ESRD, and recent PNA. Respiratory failure due to a combination of pulmonary edema secondary to CHF and ESRD and recent pneumonia.  - Remains intubated since admission, attempted extubated on 8/10, but re-intubated for increased work of breathing and tachypnea  - Continue meropenem for klebsiella infection  - Continue bactrim for PJP prophylaxis  - Antibiotics per ID recs  - Continue CRRT for net negative volume loss

## 2019-08-13 NOTE — PROCEDURES
"Sisi Medel is a 58 y.o. female patient.    Temp: 98.8 °F (37.1 °C) (08/13/19 0300)  Pulse: 70 (08/13/19 0300)  Resp: (!) 24 (08/08/19 0541)  BP: 105/69 (08/13/19 0300)  SpO2: 95 % (08/13/19 0300)  Weight: 109.8 kg (242 lb) (08/11/19 1120)  Height: 5' 8" (172.7 cm) (08/11/19 1120)       Central Line  Date/Time: 8/13/2019 4:04 AM  Location procedure was performed: [facility] SURGICAL ICU (SICU)  Performed by: Yolanda De Los Santos NP  Pre-operative Diagnosis: renal failure  Post-operative diagnosis: renal failure  Consent Done: Yes  Time out: Immediately prior to procedure a "time out" was called to verify the correct patient, procedure, equipment, support staff and site/side marked as required.  Indications: hemodialysis  Anesthesia: local infiltration    Anesthesia:  Local Anesthetic: lidocaine 1% without epinephrine  Anesthetic total: 5 mL  Preparation: skin prepped with ChloraPrep  Skin prep agent dried: skin prep agent completely dried prior to procedure  Sterile barriers: all five maximum sterile barriers used - cap, mask, sterile gown, sterile gloves, and large sterile sheet  Hand hygiene: hand hygiene performed prior to central venous catheter insertion  Location details: right internal jugular  Catheter type: double lumen  Catheter Size: 11 Fr.  Catheter Length: 15cm    Ultrasound guidance: yes  Vessel Caliber: large, patent, compressibility normal  Vascular Doppler: not done  Needle advanced into vessel with real time Ultrasound guidance.  Sterile sheath used.  Manometry: Yes  Number of attempts: 1  Assessment: placement verified by x-ray,  no pneumothorax on x-ray and successful placement  Technical procedures used: micro puncture kit  Complications: none  Estimated blood loss (mL): 3  Specimens: No  Implants: No  Post-procedure: line sutured,  chlorhexidine patch,  sterile dressing applied and blood return through all ports  Complications: No          Yolanda De Los Santos  8/13/2019  "

## 2019-08-13 NOTE — ASSESSMENT & PLAN NOTE
Patient has hx of HF (EF 35%). CXR in the ED suggestive of HF. BNP 1424 and troponins 0.156.  Adherence to home medication regimen is unclear.  - Repeat TTE EF 25% on 8/11, LVH, G3DD, LAE/JAGUAR, moderate RV systolic dysfunction   - Continue CRRT with plan for net neutral I/O today  - Continues to require pressors, currently on levo and vasopressin.

## 2019-08-13 NOTE — PROGRESS NOTES
Updated Dr. Rodrigues on pt labs after CRRT was stopped. MD notified dialysis nurse stated that the line may need to be replaced for CRRT to run effectively. Will continue to monitor.

## 2019-08-13 NOTE — PLAN OF CARE
Problem: Adult Inpatient Plan of Care  Goal: Plan of Care Review  Outcome: Ongoing (interventions implemented as appropriate)  Pt remained intubated overnight. AC/VC+, 35%, 5-peep, rate-18.  O2 > 92%.     HR: 69-70's paced rhythm.     MAP: > 65.      A-febrile.  Gtts: Fentanyl, Heparin, Levo, Precedex, and Vaso.   TF at 20cc/hr, pt tolerating well.  Pt anuric. No BM this shift.   CRRT stopped due to poor catheter positioning. New dialysis catheter placed this shift.   Pt follows commands, and moves all extremities purposefully. Pt free of falls and additional skin breakdown at this time.   Plan of care reviewed with pt and family, all questions answered.

## 2019-08-13 NOTE — SUBJECTIVE & OBJECTIVE
Interval History/Significant Events: Patient had nonsustained VTach late afternoon, no intervention necessary. Trialysis line replaced and CRRT restarted this morning.   Patient has had no significant change in overall status.     Review of Systems   Unable to perform ROS: Intubated     Objective:     Vital Signs (Most Recent):  Temp: 97 °F (36.1 °C) (08/13/19 1300)  Pulse: 72 (08/13/19 1300)  Resp: (!) 24 (08/08/19 0541)  BP: 104/69 (08/13/19 1300)  SpO2: 98 % (08/13/19 1300) Vital Signs (24h Range):  Temp:  [96.3 °F (35.7 °C)-99.5 °F (37.5 °C)] 97 °F (36.1 °C)  Pulse:  [68-74] 72  SpO2:  [77 %-100 %] 98 %  BP: ()/(53-82) 104/69  Arterial Line BP: (102-137)/(56-87) 120/76   Weight: 107.8 kg (237 lb 10.5 oz)  Body mass index is 36.14 kg/m².      Intake/Output Summary (Last 24 hours) at 8/13/2019 1422  Last data filed at 8/13/2019 1300  Gross per 24 hour   Intake 2734.11 ml   Output 3299 ml   Net -564.89 ml       Physical Exam   Constitutional: She appears ill. No distress. She is sedated, intubated and restrained.   HENT:   Head: Normocephalic and atraumatic.   Right Ear: External ear normal.   Left Ear: External ear normal.   Eyes: Pupils are equal, round, and reactive to light. Conjunctivae and EOM are normal. Right eye exhibits no discharge. Left eye exhibits no discharge.   Neck: Normal range of motion. Neck supple.   trialysis catheter in place. Central line in place   Cardiovascular: Normal rate, normal heart sounds and intact distal pulses.   No murmur heard.  Pulses:       Radial pulses are 2+ on the right side, and 2+ on the left side.   Pulmonary/Chest: Effort normal. No stridor. She is intubated. No respiratory distress. She has no wheezes. She has rales.   Abdominal: Soft. There is no tenderness.   Musculoskeletal: She exhibits edema (mild). She exhibits no tenderness.   Neurological: She is alert.   Follows basic commands.   Skin: Skin is warm and dry. She is not diaphoretic. No erythema.    Nursing note and vitals reviewed.      Vents:  Vent Mode: A/C (08/13/19 1300)  Ventilator Initiated: Yes (08/10/19 1742)  Set Rate: 18 bmp (08/13/19 1300)  Vt Set: 400 mL (08/13/19 1300)  Pressure Support: 10 cmH20 (08/12/19 0645)  PEEP/CPAP: 5 cmH20 (08/13/19 1300)  Oxygen Concentration (%): 35 (08/13/19 1300)  Peak Airway Pressure: 31 cmH2O (08/13/19 1300)  Plateau Pressure: 30 cmH20 (08/13/19 1300)  Total Ve: 7.13 mL (08/13/19 1300)  F/VT Ratio<105 (RSBI): (!) 55.43 (08/08/19 0541)  Lines/Drains/Airways     Central Venous Catheter Line                 Percutaneous Central Line Insertion/Assessment - triple lumen  08/04/19 1803 left internal jugular 8 days         Hemodialysis Catheter 08/13/19 0400 right internal jugular less than 1 day          Drain                 Hemodialysis AV Graft Left upper arm -- days         NG/OG Tube 08/11/19 1600 Center mouth 1 day          Airway                 Airway - Non-Surgical 08/10/19 1756 Endotracheal Tube 2 days          Arterial Line                 Arterial Line 8 days              Significant Labs:    CBC/Anemia Profile:  Recent Labs   Lab 08/12/19  0302 08/12/19  0714 08/13/19  0400   WBC 39.70*  --  26.86*   HGB 8.1*  --  7.4*   HCT 28.8* 30* 26.0*   *  --  114*   MCV 91  --  89   RDW 23.3*  --  23.5*        Chemistries:  Recent Labs   Lab 08/12/19  0302 08/12/19  1357 08/12/19  2130 08/13/19  0400   *  134* 135* 137 136  136   K 5.6*  5.6* 4.8 5.3* 5.5*  5.5*     101 103 105 105  105   CO2 18*  18* 22* 21* 21*  21*   BUN 30*  30* 21* 18 23*  23*   CREATININE 2.4*  2.4* 1.8* 1.6* 2.0*  2.0*   CALCIUM 8.3*  8.3* 8.1* 8.0* 8.0*  8.0*   ALBUMIN 2.3*  2.3* 2.2* 2.1* 2.1*  2.1*   PROT 6.5  --   --  6.0   BILITOT 1.0  --   --  0.7   ALKPHOS 112  --   --  115   ALT 18  --   --  15   AST 51*  --   --  22   MG 2.8* 2.4 2.3 2.4   PHOS 6.8*  6.8* 4.6* 4.3 5.1*  5.1*       ABGs:   Recent Labs   Lab 08/13/19  0724   PH 7.379   PCO2 37.9    HCO3 22.4*   POCSATURATED 96   BE -3       Significant Imaging:  I have reviewed and interpreted all pertinent imaging results/findings within the past 24 hours.

## 2019-08-13 NOTE — ASSESSMENT & PLAN NOTE
- Home medication: linagliptin.   - Glucose trended up to 273 while on ISS  - Insulin gtt 2units/hr added  - POCT q1

## 2019-08-14 PROBLEM — L89.152 PRESSURE INJURY OF SACRAL REGION, STAGE 2: Status: ACTIVE | Noted: 2019-01-01

## 2019-08-14 PROBLEM — R23.9 ALTERATION IN SKIN INTEGRITY: Status: ACTIVE | Noted: 2019-01-01

## 2019-08-14 NOTE — SUBJECTIVE & OBJECTIVE
Interval History/Significant Events:   Overnight, ET tube needed to be advanced due to cuff leak, CXR confirmed good placement. She is increasingly anxious and has infrequent PVCs when awake.  Pressor requirements have decreased.     Review of Systems   Unable to perform ROS: Intubated     Objective:     Vital Signs (Most Recent):  Temp: 98 °F (36.7 °C) (08/14/19 1100)  Pulse: 69 (08/14/19 1115)  Resp: (!) 21 (08/14/19 0517)  BP: (!) 83/61 (08/14/19 1100)  SpO2: 97 % (08/14/19 1115) Vital Signs (24h Range):  Temp:  [95.7 °F (35.4 °C)-98.1 °F (36.7 °C)] 98 °F (36.7 °C)  Pulse:  [54-75] 69  Resp:  [18-26] 21  SpO2:  [85 %-100 %] 97 %  BP: ()/(46-81) 83/61  Arterial Line BP: ()/(48-75) 99/62   Weight: 107.6 kg (237 lb 3.4 oz)  Body mass index is 36.07 kg/m².      Intake/Output Summary (Last 24 hours) at 8/14/2019 1159  Last data filed at 8/14/2019 1100  Gross per 24 hour   Intake 6018.59 ml   Output 9204 ml   Net -3185.41 ml       Physical Exam   Constitutional: She appears ill. No distress. She is sedated, intubated and restrained.   HENT:   Head: Normocephalic and atraumatic.   Right Ear: External ear normal.   Left Ear: External ear normal.   Eyes: Pupils are equal, round, and reactive to light. Conjunctivae and EOM are normal. Right eye exhibits no discharge. Left eye exhibits no discharge.   Neck: Normal range of motion. Neck supple.   trialysis catheter in place. Central line in place   Cardiovascular: Normal rate, normal heart sounds and intact distal pulses.   No murmur heard.  Pulses:       Radial pulses are 2+ on the right side, and 2+ on the left side.   Pulmonary/Chest: Effort normal. No stridor. She is intubated. No respiratory distress. She has no wheezes. She has rales.   Abdominal: Soft. There is no tenderness.   Femoral A-line in place   Musculoskeletal: She exhibits no tenderness.   Neurological: She is alert.   Follows basic commands.   Skin: Skin is warm and dry. She is not diaphoretic.  No erythema.   Nursing note and vitals reviewed.      Vents:  Vent Mode: A/C (08/14/19 0852)  Ventilator Initiated: Yes (08/10/19 1742)  Set Rate: 18 bmp (08/14/19 0852)  Vt Set: 400 mL (08/14/19 0852)  Pressure Support: 10 cmH20 (08/12/19 0645)  PEEP/CPAP: 5 cmH20 (08/14/19 0852)  Oxygen Concentration (%): 34 (08/14/19 1115)  Peak Airway Pressure: 31 cmH2O (08/14/19 0852)  Plateau Pressure: 30 cmH20 (08/14/19 0852)  Total Ve: 8.08 mL (08/14/19 0852)  F/VT Ratio<105 (RSBI): (!) 45.26 (08/14/19 0517)  Lines/Drains/Airways     Central Venous Catheter Line                 Percutaneous Central Line Insertion/Assessment - triple lumen  08/04/19 1803 left internal jugular 9 days         Hemodialysis Catheter 08/13/19 0400 right internal jugular 1 day          Drain                 Hemodialysis AV Graft Left upper arm -- days         NG/OG Tube 08/11/19 1600 Center mouth 2 days          Airway                 Airway - Non-Surgical 08/10/19 1756 Endotracheal Tube 3 days          Arterial Line                 Arterial Line 08/13/19 1700 Right Femoral less than 1 day              Significant Labs:    CBC/Anemia Profile:  Recent Labs   Lab 08/13/19  0400 08/14/19  0300   WBC 26.86* 25.88*   HGB 7.4* 7.1*   HCT 26.0* 25.0*   * 113*   MCV 89 90   RDW 23.5* 23.9*        Chemistries:  Recent Labs   Lab 08/13/19  0400 08/13/19  1521 08/13/19  2200 08/14/19  0300     136 142 142 142  142   K 5.5*  5.5* 4.1 4.2 4.5  4.5     105 107 107 108  108   CO2 21*  21* 29 26 27  27   BUN 23*  23* 6 5* 4*  4*   CREATININE 2.0*  2.0* 0.7 0.6 0.5  0.5   CALCIUM 8.0*  8.0* 8.7 8.2* 7.8*  7.8*   ALBUMIN 2.1*  2.1* 2.0* 2.0* 2.1*  2.1*   PROT 6.0  --   --  6.1   BILITOT 0.7  --   --  0.8   ALKPHOS 115  --   --  108   ALT 15  --   --  15   AST 22  --   --  25   MG 2.4 2.0 1.9 1.8   PHOS 5.1*  5.1* 1.8* 4.5 2.0*  2.0*       Significant Imaging:  I have reviewed and interpreted all pertinent imaging results/findings  within the past 24 hours.

## 2019-08-14 NOTE — PLAN OF CARE
Problem: Adult Inpatient Plan of Care  Goal: Plan of Care Review  Outcome: Ongoing (interventions implemented as appropriate)  Pt remained intubated overnight. AC/VC+, 35%, 5-peep, rate-18.  O2 > 97%.     HR: 69-70's paced rhythm.     MAP: > 65.      A-febrile.  Gtts: Fentanyl, Heparin, Levo, Precedex, and Vaso. Insulin gtt turned off this shift.    TF stopped for 400 cc residuals.   Pt anuric. No BM this shift.   CRRT tolerated at UF goal of 400. CRRT restarted once for clotting.   Pt follows commands, and moves all extremities purposefully. Pt free of falls and additional skin breakdown at this time.   Plan of care reviewed with pt and family, all questions answered.

## 2019-08-14 NOTE — ASSESSMENT & PLAN NOTE
Patient has hx of HF (EF 35%). CXR in the ED suggestive of HF. BNP 1424 and troponins 0.156.  Adherence to home medication regimen is unclear.  - Repeat TTE EF 25% on 8/11, LVH, G3DD, LAE/JAGUAR, moderate RV systolic dysfunction   - Continue CRRT with plan for net neutral I/O today  - Pressor requirements decreasing, currently on levo and vasopressin.

## 2019-08-14 NOTE — ASSESSMENT & PLAN NOTE
Course lung sounds and CXR concerning for PNA in LLL with interstitial fullness with leukocytosis without fevers. Respiratory cultures positive.    - ETT culture from 8/4 + MSSA, PSA and moraxella   - continue bactrim for PJP prophylaxis w/ steroid use   - respiratory cx positive for ESBL klebsiella oxytoca - on meropenem   - AFB, PJP negative   - Blood cultures no growth to date   - trend WBC (currently downtrending)   - ID following

## 2019-08-14 NOTE — PROGRESS NOTES
Notified Dr. Greer pt has an audible cuff leak despite interventions performed by ROVERTO. MD to come to bedside to assess.

## 2019-08-14 NOTE — MEDICAL/APP STUDENT
Patient Name: Sisi Medel  MRN: [id number]  Admission Date: 8/3/2019  Hospital Length of Stay: 9 days  Attending Provider: Edward Le*   Primary Care Physician: Carlos A Caputo MD  Principal Problem:Acute on chronic respiratory failure with hypoxia and hypercapnia    Subjective:    HPI:  Ms Medel  59 yo f with ESRD on HD, cervical radiculopathy, CHF, Chronic resp failure with hypoxia, T2DM, DDD, paroxysmal Afib, seizure, shingles, thyroid disorder here for a 3 day history of cough and SOB. Patient was at dialysis and had to stop at 1.6L due to resp distress. She was put in a nonrebreather at 12L of oxygen. She was transported via EMS. Overnight she required BiPAP for worsening SOB. Rapid response was called this morning because she was hypotensive 70s/50s. Dr. Rosas, MICU team, went and recheck BP was 107/59. She later became hypertensive again and continued to be on BiPAP, she was stepped up for dialysis, BP monitoring, possible vasopressor requirement and further respiratory monitoring.    During step up she was persistently hypoglycemic and hypotensive, peripheral vascular access was lost and a left TLC was placed at bedside. It was determined she could not tolerated her normal HD and she had tenuous respiratory status, so decision was made to intubate. She has been started on broad spectrum abx, and right HD IJ catheter was placed. Nephro been made aware and SCUF planned for today.     Interval History:  Remained intubated, ventilated, sedated overnight on fentanyl, precedex, lephophed, Vasopressin, heparin. Pressor requirements and ventilator requirements remained stable on CRRT through the night. FiO2 35%, 5-peep, RR18, O2>97%, MAP >65. CRRT stopped briefly due to clot in line, resolved. ETT moved due to cuff leak. CXR and physical exam exhibit improvement in fluid overload.    Review of Systems   Unable to perform ROS: Intubated       Objective:     Vitals:    08/14/19 1030 08/14/19  1045 08/14/19 1100 08/14/19 1115   BP:   (!) 83/61    BP Location:   Right arm    Patient Position:   Lying    Pulse: 69 69 71 69   Resp:       Temp:   98 °F (36.7 °C)    TempSrc:   Oral    SpO2: 96% 96% 96% 97%   Weight:       Height:         Physical Exam   Constitutional: She is sedated, intubated and restrained.   Cardiovascular: Normal rate, regular rhythm and normal heart sounds.   No murmur heard.  Pulmonary/Chest: She is intubated. She has rales.    Peripheral [place] is improved mildly    I/O last day:  In: 7359 [I.V.:6479; NG/GT:580; IV Piggyback:300]  Out: - 9207 [SLED 8607, ]    Recent Labs     08/13/19  0400  08/13/19  2200 08/14/19  0300     136   < > 142 142  142   K 5.5*  5.5*   < > 4.2 4.5  4.5     105   < > 107 108  108   CO2 21*  21*   < > 26 27  27   BUN 23*  23*   < > 5* 4*  4*   CREATININE 2.0*  2.0*   < > 0.6 0.5  0.5   CALCIUM 8.0*  8.0*   < > 8.2* 7.8*  7.8*   PHOS 5.1*  5.1*   < > 4.5 2.0*  2.0*   RBC 2.92*  --   --  2.77*   WBC 26.86*  --   --  25.88*   *  --   --  113*    < > = values in this interval not displayed.     Recent Labs     08/13/19  0724 08/14/19  0323   PH 7.379 7.427   PCO2 37.9 38.6   PO2 85 120*   HCO3 22.4* 25.5   POCSATURATED 96 99   BE -3 1     Important Imaging:  Narrative     EXAMINATION:  XR CHEST AP PORTABLE    CLINICAL HISTORY:  eval ETT placement;    TECHNIQUE:  Single frontal view of the chest was performed.    COMPARISON:  03:50 today    FINDINGS:  Single AP portable view at 21:15    The endotracheal tube tip projects about 2 cm from the mine good position.  Cardiac pacing device overlies the right chest with leads extending to the expected location of the right ventricle and coronary sinus.  A nasogastric tube extends to the mid stomach.  No pneumothorax or pleural effusion.  There is mild hazy opacity of the mid lungs and especially the left base with overall improving aeration from 04:01 today.  This probably  represents resolving edema..  There is vascular stents of the left axillary region.  No abdominal free air.  No acute fracture.  Heart is mildly enlarged unchanged      Impression       Clearing edema.      Electronically signed by: Priscila Yepez  Date: 08/13/2019  Time: 22:46       Assessment:   ESRD on hemodialysis    Fluid overload and electrolyte derangement due to interruption to CRRT overnight.  Plan:   Plan:  - Continue SLED today   - reduced SLED hourly UF target to 350cc   - Strict I/Os and chart  - Will follow closely     I will follow-up with patient. Please contact us if you have any additional questions.

## 2019-08-14 NOTE — PROGRESS NOTES
Notified Dr. Greer pt currently on Norepinephrine at 0.04 mcg/kg/min, and Vasopressin at 0.04 units/min. Orders received to stop the Vasopressin infusion. MD also notified of pt TF residuals of 400 cc with this assessment after tube manipulation. Orders received to discard contents and hold TF's for the night.

## 2019-08-14 NOTE — PROGRESS NOTES
[facility]  Critical Care Medicine  Progress Note    Patient Name: Sisi Medel  MRN: [id number]  Admission Date: 8/3/2019  Hospital Length of Stay: 11 days  Code Status: Full Code  Attending Provider: Edward Le*  Primary Care Provider: Carlos A Caputo MD   Principal Problem: Acute on chronic respiratory failure with hypoxia and hypercapnia    Subjective:     HPI:  Ms Medel  57 yo f with ESRD on HD, cervical radiculopathy, CHF, Chronic resp failure with hypoxia, T2DM, DDD, paroxysmal Afib, seizure, shingles, thyroid disorder here for a 3 day history of cough and SOB. Patient was at dialysis and had to stop at 1.6L due to resp distress. She was put in a nonrebreather at 12L of oxygen. She was transported via EMS. Overnight she required BiPAP for worsening SOB. Rapid response was called this morning because she was hypotensive 70s/50s. Dr. Rosas, MICU team, went and recheck BP was 107/59. She later became hypertensive again and continued to be on BiPAP, she was stepped up for dialysis, BP monitoring, possible vasopressor requirement and further respiratory monitoring.     During step up she was persistently hypoglycemic and hypotensive, peripheral vascular access was lost and a left TLC was placed at bedside. It was determined she could not tolerated her normal HD and she had tenuous respiratory status, so decision was made to intubate. She has been started on broad spectrum abx, and right HD IJ catheter was placed. Nephro been made aware and SCUF planned for today.     Hospital/ICU Course:  08/04 Stepped up to ICU, intubated, started on broad spectrum abx, HD catheter placed, plan is for SCUF today.   08/05 SCUF started in the AM, levo requirement of 0.06. Still intubated. Plan to transition to SLED. Blood cultures negative to date, covered with Vanc and Zosyn   08/06 On SLED today, had increase of pressor requirement was at 0.13 levo and .04 vaso, increase white count  13.3 and temp overnight 100.4. Sputum culture grew pseudomonas, d/c vancomycin, plan is to transition to SCUF.   08/07 Patient continued on levophed and vasopressin, with decreasing levophed requirements. Continued on zosyn for pseudomonas growing in respiratory cultures. SAT attempted this morning with significant tachypnea and agitation. Propofol reinitiated at prior rate, fentanyl at 75. Patient has corneal reflexes, spontaneously opens eyes but mental status is not at baseline - EEG and CTH ordered. CTH unremarkble, EEG performed but not yet read. Mental status improving, leukocytosis worsening but no evidence of infection. Likely related to stress dose steroids.   08/09 EEG result c/w encephalopathy; passed SAT; SBT: RSBI 88, however hypotensive with SBT and tachypneic to 35   08/10 holding tube feeds, SLED, on zosyn and bactrim for PNA and long term steroid use, and attempted extubation today. Re-intubated for increased WOB and high RR  08/11: Increasing levophed requirements, stable troponin, a-line placed overnight, respiratory and BCx repeated. Remains intubated. Restarted tube feeds. Aiming for net neutral output, stopped SLED  08/12: On meropenem for klebsiella, bactrim for PJP prophylaxis. Trialysis line replaced, CRRT restarted.   08/13: PVCs infrequently, pressor requirements decreased.    Interval History/Significant Events:   Overnight, ET tube needed to be advanced due to cuff leak, CXR confirmed good placement. She is increasingly anxious and has infrequent PVCs when awake.  Pressor requirements have decreased.     Review of Systems   Unable to perform ROS: Intubated     Objective:     Vital Signs (Most Recent):  Temp: 98 °F (36.7 °C) (08/14/19 1100)  Pulse: 69 (08/14/19 1115)  Resp: (!) 21 (08/14/19 0517)  BP: (!) 83/61 (08/14/19 1100)  SpO2: 97 % (08/14/19 1115) Vital Signs (24h Range):  Temp:  [95.7 °F (35.4 °C)-98.1 °F (36.7 °C)] 98 °F (36.7 °C)  Pulse:  [54-75] 69  Resp:  [18-26] 21  SpO2:   [85 %-100 %] 97 %  BP: ()/(46-81) 83/61  Arterial Line BP: ()/(48-75) 99/62   Weight: 107.6 kg (237 lb 3.4 oz)  Body mass index is 36.07 kg/m².      Intake/Output Summary (Last 24 hours) at 8/14/2019 1159  Last data filed at 8/14/2019 1100  Gross per 24 hour   Intake 6018.59 ml   Output 9204 ml   Net -3185.41 ml       Physical Exam   Constitutional: She appears ill. No distress. She is sedated, intubated and restrained.   HENT:   Head: Normocephalic and atraumatic.   Right Ear: External ear normal.   Left Ear: External ear normal.   Eyes: Pupils are equal, round, and reactive to light. Conjunctivae and EOM are normal. Right eye exhibits no discharge. Left eye exhibits no discharge.   Neck: Normal range of motion. Neck supple.   trialysis catheter in place. Central line in place   Cardiovascular: Normal rate, normal heart sounds and intact distal pulses.   No murmur heard.  Pulses:       Radial pulses are 2+ on the right side, and 2+ on the left side.   Pulmonary/Chest: Effort normal. No stridor. She is intubated. No respiratory distress. She has no wheezes. She has rales.   Abdominal: Soft. There is no tenderness.   Femoral A-line in place   Musculoskeletal: She exhibits no tenderness.   Neurological: She is alert.   Follows basic commands.   Skin: Skin is warm and dry. She is not diaphoretic. No erythema.   Nursing note and vitals reviewed.      Vents:  Vent Mode: A/C (08/14/19 0852)  Ventilator Initiated: Yes (08/10/19 1742)  Set Rate: 18 bmp (08/14/19 0852)  Vt Set: 400 mL (08/14/19 0852)  Pressure Support: 10 cmH20 (08/12/19 0645)  PEEP/CPAP: 5 cmH20 (08/14/19 0852)  Oxygen Concentration (%): 34 (08/14/19 1115)  Peak Airway Pressure: 31 cmH2O (08/14/19 0852)  Plateau Pressure: 30 cmH20 (08/14/19 0852)  Total Ve: 8.08 mL (08/14/19 0852)  F/VT Ratio<105 (RSBI): (!) 45.26 (08/14/19 0517)  Lines/Drains/Airways     Central Venous Catheter Line                 Percutaneous Central Line Insertion/Assessment  - triple lumen  08/04/19 1803 left internal jugular 9 days         Hemodialysis Catheter 08/13/19 0400 right internal jugular 1 day          Drain                 Hemodialysis AV Graft Left upper arm -- days         NG/OG Tube 08/11/19 1600 Center mouth 2 days          Airway                 Airway - Non-Surgical 08/10/19 1756 Endotracheal Tube 3 days          Arterial Line                 Arterial Line 08/13/19 1700 Right Femoral less than 1 day              Significant Labs:    CBC/Anemia Profile:  Recent Labs   Lab 08/13/19  0400 08/14/19  0300   WBC 26.86* 25.88*   HGB 7.4* 7.1*   HCT 26.0* 25.0*   * 113*   MCV 89 90   RDW 23.5* 23.9*        Chemistries:  Recent Labs   Lab 08/13/19  0400 08/13/19  1521 08/13/19  2200 08/14/19  0300     136 142 142 142  142   K 5.5*  5.5* 4.1 4.2 4.5  4.5     105 107 107 108  108   CO2 21*  21* 29 26 27  27   BUN 23*  23* 6 5* 4*  4*   CREATININE 2.0*  2.0* 0.7 0.6 0.5  0.5   CALCIUM 8.0*  8.0* 8.7 8.2* 7.8*  7.8*   ALBUMIN 2.1*  2.1* 2.0* 2.0* 2.1*  2.1*   PROT 6.0  --   --  6.1   BILITOT 0.7  --   --  0.8   ALKPHOS 115  --   --  108   ALT 15  --   --  15   AST 22  --   --  25   MG 2.4 2.0 1.9 1.8   PHOS 5.1*  5.1* 1.8* 4.5 2.0*  2.0*       Significant Imaging:  I have reviewed and interpreted all pertinent imaging results/findings within the past 24 hours.      ABG  Recent Labs   Lab 08/14/19  0323   PH 7.427   PO2 120*   PCO2 38.6   HCO3 25.5   BE 1     Assessment/Plan:     Neuro  Encephalopathy  Mental status improving. Currently on precedex and fentanyl for sedation  - CTH unremarkable, EEG 8/7 showed encephalopathy    Epilepsy  Continue keppra. EEG showed encephalopathy.     Pulmonary  * Acute on chronic respiratory failure with hypoxia and hypercapnia  Hx of sarcoidosis, CHF, ESRD, and recent PNA. Respiratory failure due to a combination of pulmonary edema secondary to CHF and ESRD and recent pneumonia.  - Remains intubated since  admission, attempted extubated on 8/10, but re-intubated for increased work of breathing and tachypnea  - Continue meropenem for klebsiella infection  - Continue bactrim for PJP prophylaxis  - Antibiotics per ID recs  - Continue CRRT for net negative volume loss        Pneumonia  Course lung sounds and CXR concerning for PNA in LLL with interstitial fullness with leukocytosis without fevers. Respiratory cultures positive.    - ETT culture from 8/4 + MSSA, PSA and moraxella   - continue bactrim for PJP prophylaxis w/ steroid use   - respiratory cx positive for ESBL klebsiella oxytoca - on meropenem   - AFB, PJP negative   - Blood cultures no growth to date   - trend WBC (currently downtrending)   - ID following      Pulmonary hypertension  Likely Group 2 pHTN related to HFrEF. Continue on dialysis     TTE (8/6/2019): PASP of 50        Cardiac/Vascular  Permanent atrial fibrillation  Patient presents with A fib in the ED. Patient is on chronic warfarin. S/P ablation multiple times.   Plan  -Continue heparin gtt  -Pacemaker in place       Acute on chronic systolic congestive heart failure  Patient has hx of HF (EF 35%). CXR in the ED suggestive of HF. BNP 1424 and troponins 0.156.  Adherence to home medication regimen is unclear.  - Repeat TTE EF 25% on 8/11, LVH, G3DD, LAE/JAGUAR, moderate RV systolic dysfunction   - Continue CRRT with plan for net neutral I/O today  - Pressor requirements decreasing, currently on levo and vasopressin.     Hypotension  On 10 mg midodrine TID at home. Currently on levophed 0.1 and vaso 0.4 based on a-line. On fentanyl drip, will restart precedex.   - Continue midodrine  - levophed/vaso - titrate to map 65  - Will monitor closely as patient restarts dialysis today.       Renal/  ESRD on hemodialysis  - continue CRRT for goal of net neutral I/O, CXR improving, vasopressor stable  - follow nephrology recommendations      Immunology/Multi System  Pulmonary sarcoidosis  Stress dose steroids  while in septic shock. Started on bactrim per ID with chronic steroid use    Hematology  Current use of long term anticoagulation  - heparin gtt while IP  - no evidence of hepatic dysfunction on labs - minor bili bump      Endocrine  Type 2 diabetes mellitus, without long-term current use of insulin  - Home medication: linagliptin.   - Glucose readings 167,141,145     GI  Constipation  On senna and colace    Other  Class 2 severe obesity due to excess calories with serious comorbidity and body mass index (BMI) of 38.0 to 38.9 in adult  Will need to revisit weight loss when improving  Significant pannus    Hypoalbuminemia  Currently on tube feeds.  - At 30 with a goal of 40.  - On renal novasource     Current chronic use of systemic steroids  - no evidence of PJP ppx on MAR  - on stress dose during septic shock   - On bactrim for PJP prophylaxis    Counseling regarding goals of care  Attempted to discuss goals of care with family on 8/10. Patient's adult children would not like to have this discussion at this time. Patient appears comfortable on vent. Will revisit goals of care discussion with family.       Critical Care Daily Checklist:    A: Awake: RASS Goal/Actual Goal: RASS Goal: 0-->alert and calm  Actual: Horton Agitation Sedation Scale (RASS): Alert and calm   B: Spontaneous Breathing Trial Performed? Spon. Breathing Trial Initiated?: Initiated (08/10/19 0800)   C: SAT & SBT Coordinated?  Yes                     D: Delirium: CAM-ICU Overall CAM-ICU: Positive   E: Early Mobility Performed? No   F: Feeding Goal: Goals: Patient to meet > 85% EEN and EPN by RD follow-up  Status: Nutrition Goal Status: progressing towards goal   Current Diet Order   Procedures    Diet NPO Except for: Medication     Order Specific Question:   Except for     Answer:   Medication      AS: Analgesia/Sedation Fentanyl, precedex   T: Thromboembolic Prophylaxis Heparin gtt   H: HOB > 300 Yes   U: Stress Ulcer Prophylaxis (if needed)  famotidine   G: Glucose Control ISS   B: Bowel Function     I: Indwelling Catheter (Lines & Yepez) Necessity Trialysis (right IJ), TLC (left IJ), OG, A-line (femoral)   D: De-escalation of Antimicrobials/Pharmacotherapies As clinically indicated    Plan for the day/ETD Maintain low pressor requirements    Code Status:  Family/Goals of Care: Full Code         Critical secondary to Patient has a condition that poses threat to life and bodily function: Severe Respiratory Distress      Critical care was time spent personally by me on the following activities: development of treatment plan with patient or surrogate and bedside caregivers, discussions with consultants, evaluation of patient's response to treatment, examination of patient, ordering and performing treatments and interventions, ordering and review of laboratory studies, ordering and review of radiographic studies, pulse oximetry, re-evaluation of patient's condition. This critical care time did not overlap with that of any other provider or involve time for any procedures.     Elisabeth Munoz MD  Critical Care Medicine  [facility]

## 2019-08-14 NOTE — PROGRESS NOTES
CRRT restarted after equipment change with lines reversed A-V.  Tolerated well.  Primary nurse at the bedside.

## 2019-08-14 NOTE — PROGRESS NOTES
Consulted to see for sacral area, fungal rash and right pannus    59 yo f with ESRD on HD, cervical radiculopathy, CHF, Chronic resp failure with hypoxia, T2DM, DDD, paroxysmal Afib, seizure, shingles, thyroid disorder here for a 3 day history of cough and SOB. Patient was at dialysis and had to stop at 1.6L due to resp distress. She was put in a nonrebreather at 12L of oxygen. She was transported via EMS.    Resolving rash under bilateral breasts with miconazole powder in use  Right abdominal intertrigo with partial thickness skin loss . Nurse has applied aquacel lite foam dressing along abdominal crease.   Left abdominal fold with warty lesions     Sacral area with multiple scattered denuded areas. Aquacel sacral foam applied to area for treatment and prevention from friction and shear.  Will continue Q2 hour turning schedule and elevate heels.   Ordering a bariatric low air loss specialty bed   Discussed plan of care with Dr Behr Kahn     08/14/19 1300   Pain/Comfort/Sleep   Pain Rating (0-10): Rest 0   POSS (Pasero Opioid-Induced Sed Scale) 2 - Slightly drowsy, easily aroused   RASS (Horton Agitation-Sedation Scale) -1-->drowsy   Coping/Psychosocial   Observed Emotional State calm;cooperative        Wound 08/14/19 0700 Intertrigo lower Abdomen   Date First Assessed/Time First Assessed: 08/14/19 0700   Pre-existing: Yes  Primary Wound Type: Intertrigo  Side: Right  Orientation: lower  Location: Abdomen   Wound Image    Wound WDL ex   Dressing Appearance Clean;Intact   Drainage Amount None   Drainage Characteristics/Odor No odor   Appearance Red   Tissue loss description Partial thickness   Red (%), Wound Tissue Color 100 %   Periwound Area Intact   Wound Edges Open   Wound Length (cm) 0.4 cm   Wound Width (cm) 5 cm   Wound Depth (cm) 0.1 cm   Wound Volume (cm^3) 0.2 cm^3   Wound Surface Area (cm^2) 2 cm^2   Care Cleansed with:;Sterile normal saline   Dressing Applied;Foam        Wound 08/14/19 1405 Intertrigo    Date First Assessed/Time First Assessed: 08/14/19 1405   Pre-existing: No  Primary Wound Type: Intertrigo   Wound Image     Wound WDL ex   Dressing Appearance Open to air;No dressing;other (see comments)  (antifungal powder)   Drainage Amount None   Drainage Characteristics/Odor No odor   Appearance Red;Moist   Tissue loss description Not applicable   Periwound Area Intact   Care Cleansed with:;Soap and water;Applied:;Other (see comments)  (antifungal powder)        Pressure Injury 08/11/19 1600 midline Sacral spine Stage 2   Date First Assessed/Time First Assessed: 08/11/19 1600   Pressure Injury Present on Admission: (c) yes  Orientation: midline  Location: Sacral spine  Staging: Stage 2   Wound Image    Staging Stage 2   Dressing Appearance Clean;Intact   Drainage Amount None   Drainage Characteristics/Odor No odor   Appearance Pink;Moist   Tissue loss description Partial thickness   Red (%), Wound Tissue Color 100 %  (pink)   Periwound Area Redness   Wound Edges Open   Wound Length (cm) 8 cm   Wound Width (cm) 5 cm   Wound Depth (cm) 0.1 cm   Wound Volume (cm^3) 4 cm^3   Wound Surface Area (cm^2) 40 cm^2   Care Cleansed with:;Sterile normal saline   Dressing Applied;Foam     Lizzeth Figueroa RN CWON  [id number]

## 2019-08-14 NOTE — CARE UPDATE
Audible cuff leak heard over ETT. This RT checked cuff pressures with adequate pressure found. This RT asset position of ETT, with cuff leak still audible. Critical care called notified and quickly came to patient's room to asset. A stat chest x-ray was performed and the decision was made to advance ETT to 26 at the teeth. Equal breath sounds ausculted. Patient tolerated well, and will continue to closely monitor.

## 2019-08-14 NOTE — ASSESSMENT & PLAN NOTE
- continue CRRT for goal of net neutral I/O, CXR improving, vasopressor stable  - follow nephrology recommendations

## 2019-08-14 NOTE — NURSING
Critical Care team notified of patient having episodes of ectopy. 12 lead EKG ordered by Dr. Huggins. Will continue to monitor.

## 2019-08-14 NOTE — PLAN OF CARE
Called back by primary team regarding mildly elevated fungitell. WBC downtrending, vent settings unchanged. Recommend CT chest w/o contrast if able. If bronching again, please send aspergillus ag from BAL. Will re-eval patient tomorrow. Call w/ questions in the interim.    Varsha Lozano DO  Transplant ID  Infectious Disease Fellow  C: [phone]  P: [phone]

## 2019-08-14 NOTE — PLAN OF CARE
Problem: Adult Inpatient Plan of Care  Goal: Plan of Care Review  Outcome: Ongoing (interventions implemented as appropriate)  Pt afebrile. O2 sats % on ventilator settings AC, 35%, 5PEEP. % paced in 60-70s. MD paged early in shift about ectopy/ frequent PVCs. 12 lead EKG obtained, no significant abnormalities found on EKG. OK for PVCs as long as patient is not having multiple consecutive PVCs, per Dr. Le.  MAP >65 maintained on levo and vaso gtts. Patient following commands, moving all extremities. Gtts: heparin, levo, vaso, fentanyl, and precedex. PT aneuric. MD notified that patient has not had BM in multiple days. All ordered bowel reg meds given. BS hypoactive. CRRT running w/ no issues, UF goal of 350 met. BG/labs closely monitored throughout the shift. PTTs therapeutic. Pain meds controlled with fentanyl gtt. Repositioned Q2 to prevent skin breakdown. Plan to continue to monitor for increased PVCs. Possible extubation tomorrow. Plan of care reviewed with pt/family, all questions and concerns addressed. MD updated on current condition, vitals, labs, and gtts.  No new orders received, will continue to monitor. See flowsheet for full assessment/details.

## 2019-08-14 NOTE — ASSESSMENT & PLAN NOTE
Mental status improving. Currently on precedex and fentanyl for sedation  - CTH unremarkable, EEG 8/7 showed encephalopathy

## 2019-08-14 NOTE — PROGRESS NOTES
[facility]  Critical Care Medicine  Progress Note    Patient Name: Sisi Medel  MRN: [id number]  Admission Date: 8/3/2019  Hospital Length of Stay: 10 days  Code Status: Full Code  Attending Provider: Edward Le*  Primary Care Provider: Carlos A Caputo MD   Principal Problem: Acute on chronic respiratory failure with hypoxia and hypercapnia    Subjective:     HPI:  Ms Medel  57 yo f with ESRD on HD, cervical radiculopathy, CHF, Chronic resp failure with hypoxia, T2DM, DDD, paroxysmal Afib, seizure, shingles, thyroid disorder here for a 3 day history of cough and SOB. Patient was at dialysis and had to stop at 1.6L due to resp distress. She was put in a nonrebreather at 12L of oxygen. She was transported via EMS. Overnight she required BiPAP for worsening SOB. Rapid response was called this morning because she was hypotensive 70s/50s. Dr. Rosas, MICU team, went and recheck BP was 107/59. She later became hypertensive again and continued to be on BiPAP, she was stepped up for dialysis, BP monitoring, possible vasopressor requirement and further respiratory monitoring.     During step up she was persistently hypoglycemic and hypotensive, peripheral vascular access was lost and a left TLC was placed at bedside. It was determined she could not tolerated her normal HD and she had tenuous respiratory status, so decision was made to intubate. She has been started on broad spectrum abx, and right HD IJ catheter was placed. Nephro been made aware and SCUF planned for today.     Hospital/ICU Course:  08/04 Stepped up to ICU, intubated, started on broad spectrum abx, HD catheter placed, plan is for SCUF today.   08/05 SCUF started in the AM, levo requirement of 0.06. Still intubated. Plan to transition to SLED. Blood cultures negative to date, covered with Vanc and Zosyn   08/06 On SLED today, had increase of pressor requirement was at 0.13 levo and .04 vaso, increase white count  13.3 and temp overnight 100.4. Sputum culture grew pseudomonas, d/c vancomycin, plan is to transition to SCUF.   08/07 Patient continued on levophed and vasopressin, with decreasing levophed requirements. Continued on zosyn for pseudomonas growing in respiratory cultures. SAT attempted this morning with significant tachypnea and agitation. Propofol reinitiated at prior rate, fentanyl at 75. Patient has corneal reflexes, spontaneously opens eyes but mental status is not at baseline - EEG and CTH ordered. CTH unremarkble, EEG performed but not yet read. Mental status improving, leukocytosis worsening but no evidence of infection. Likely related to stress dose steroids.   08/09 EEG result c/w encephalopathy; passed SAT; SBT: RSBI 88, however hypotensive with SBT and tachypneic to 35   08/10 holding tube feeds, SLED, on zosyn and bactrim for PNA and long term steroid use, and attempted extubation today. Re-intubated for increased WOB and high RR  08/11: Increasing levophed requirements, stable troponin, a-line placed overnight, respiratory and BCx repeated. Remains intubated. Restarted tube feeds. Aiming for net neutral output, stopped SLED  08/12: On meropenem for klebsiella, bactrim for PJP prophylaxis. Trialysis line replaced, CRRT restarted.         Interval History/Significant Events: Patient had nonsustained VTach late afternoon, no intervention necessary. Trialysis line replaced and CRRT restarted this morning.   Patient has had no significant change in overall status.     Review of Systems   Unable to perform ROS: Intubated     Objective:     Vital Signs (Most Recent):  Temp: 97 °F (36.1 °C) (08/13/19 1300)  Pulse: 72 (08/13/19 1300)  Resp: (!) 24 (08/08/19 0541)  BP: 104/69 (08/13/19 1300)  SpO2: 98 % (08/13/19 1300) Vital Signs (24h Range):  Temp:  [96.3 °F (35.7 °C)-99.5 °F (37.5 °C)] 97 °F (36.1 °C)  Pulse:  [68-74] 72  SpO2:  [77 %-100 %] 98 %  BP: ()/(53-82) 104/69  Arterial Line BP:  (102-137)/(56-87) 120/76   Weight: 107.8 kg (237 lb 10.5 oz)  Body mass index is 36.14 kg/m².      Intake/Output Summary (Last 24 hours) at 8/13/2019 1422  Last data filed at 8/13/2019 1300  Gross per 24 hour   Intake 2734.11 ml   Output 3299 ml   Net -564.89 ml       Physical Exam   Constitutional: She appears ill. No distress. She is sedated, intubated and restrained.   HENT:   Head: Normocephalic and atraumatic.   Right Ear: External ear normal.   Left Ear: External ear normal.   Eyes: Pupils are equal, round, and reactive to light. Conjunctivae and EOM are normal. Right eye exhibits no discharge. Left eye exhibits no discharge.   Neck: Normal range of motion. Neck supple.   trialysis catheter in place. Central line in place   Cardiovascular: Normal rate, normal heart sounds and intact distal pulses.   No murmur heard.  Pulses:       Radial pulses are 2+ on the right side, and 2+ on the left side.   Pulmonary/Chest: Effort normal. No stridor. She is intubated. No respiratory distress. She has no wheezes. She has rales.   Abdominal: Soft. There is no tenderness.   Musculoskeletal: She exhibits edema (mild). She exhibits no tenderness.   Neurological: She is alert.   Follows basic commands.   Skin: Skin is warm and dry. She is not diaphoretic. No erythema.   Nursing note and vitals reviewed.      Vents:  Vent Mode: A/C (08/13/19 1300)  Ventilator Initiated: Yes (08/10/19 1742)  Set Rate: 18 bmp (08/13/19 1300)  Vt Set: 400 mL (08/13/19 1300)  Pressure Support: 10 cmH20 (08/12/19 0645)  PEEP/CPAP: 5 cmH20 (08/13/19 1300)  Oxygen Concentration (%): 35 (08/13/19 1300)  Peak Airway Pressure: 31 cmH2O (08/13/19 1300)  Plateau Pressure: 30 cmH20 (08/13/19 1300)  Total Ve: 7.13 mL (08/13/19 1300)  F/VT Ratio<105 (RSBI): (!) 55.43 (08/08/19 0541)  Lines/Drains/Airways     Central Venous Catheter Line                 Percutaneous Central Line Insertion/Assessment - triple lumen  08/04/19 1803 left internal jugular 8 days          Hemodialysis Catheter 08/13/19 0400 right internal jugular less than 1 day          Drain                 Hemodialysis AV Graft Left upper arm -- days         NG/OG Tube 08/11/19 1600 Center mouth 1 day          Airway                 Airway - Non-Surgical 08/10/19 1756 Endotracheal Tube 2 days          Arterial Line                 Arterial Line 8 days              Significant Labs:    CBC/Anemia Profile:  Recent Labs   Lab 08/12/19  0302 08/12/19  0714 08/13/19  0400   WBC 39.70*  --  26.86*   HGB 8.1*  --  7.4*   HCT 28.8* 30* 26.0*   *  --  114*   MCV 91  --  89   RDW 23.3*  --  23.5*        Chemistries:  Recent Labs   Lab 08/12/19  0302 08/12/19  1357 08/12/19  2130 08/13/19  0400   *  134* 135* 137 136  136   K 5.6*  5.6* 4.8 5.3* 5.5*  5.5*     101 103 105 105  105   CO2 18*  18* 22* 21* 21*  21*   BUN 30*  30* 21* 18 23*  23*   CREATININE 2.4*  2.4* 1.8* 1.6* 2.0*  2.0*   CALCIUM 8.3*  8.3* 8.1* 8.0* 8.0*  8.0*   ALBUMIN 2.3*  2.3* 2.2* 2.1* 2.1*  2.1*   PROT 6.5  --   --  6.0   BILITOT 1.0  --   --  0.7   ALKPHOS 112  --   --  115   ALT 18  --   --  15   AST 51*  --   --  22   MG 2.8* 2.4 2.3 2.4   PHOS 6.8*  6.8* 4.6* 4.3 5.1*  5.1*       ABGs:   Recent Labs   Lab 08/13/19  0724   PH 7.379   PCO2 37.9   HCO3 22.4*   POCSATURATED 96   BE -3       Significant Imaging:  I have reviewed and interpreted all pertinent imaging results/findings within the past 24 hours.      ABG  Recent Labs   Lab 08/13/19 0724   PH 7.379   PO2 85   PCO2 37.9   HCO3 22.4*   BE -3     Assessment/Plan:     Neuro  Encephalopathy  Mental status improving. Currently on precedex and fentanyl for sedation  - Add precedex in order to decrease fentanyl dosage. Precedex previously d/c due to frequent PVC's however pressor requirements at that time was also high. Now that levophed dosage has decreased, will restart precedex.   - CTH unremarkable, EEG 8/7 showed encephalopathy    Epilepsy  Continue  keppra. EEG showed encephalopathy.     Pulmonary  * Acute on chronic respiratory failure with hypoxia and hypercapnia  Hx of sarcoidosis, CHF, ESRD, and recent PNA. Respiratory failure due to a combination of pulmonary edema secondary to CHF and ESRD and recent pneumonia.  - Remains intubated since admission, attempted extubated on 8/10, but re-intubated for increased work of breathing and tachypnea  - Continue meropenem for klebsiella infection  - Continue bactrim for PJP prophylaxis  - Antibiotics per ID recs  - Continue CRRT for net negative volume loss        Pneumonia  Course lung sounds and CXR concerning for PNA in LLL with interstitial fullness with leukocytosis without fevers. Respiratory cultures positive.    - ETT culture from 8/4 + MSSA, PSA and moraxella   - continue bactrim for PJP prophylaxis w/ steroid use   - respiratory cx positive for ESBL klebsiella oxytoca - on meropenem   - f/u fungitell and PJP PCR from bronch on 8/8    - Blood cultured no growth to date   - trend WBC (currently downtrending)   - ID following      Pulmonary hypertension  Likely Group 2 pHTN related to HFrEF. Continue on dialysis     TTE (8/6/2019): PASP of 50        Cardiac/Vascular  Permanent atrial fibrillation  Patient presents with A fib in the ED. Patient is on chronic warfarin. S/P ablation multiple times.   Plan  -Continue heparin gtt  -Pacemaker in place       Acute on chronic systolic congestive heart failure  Patient has hx of HF (EF 35%). CXR in the ED suggestive of HF. BNP 1424 and troponins 0.156.  Adherence to home medication regimen is unclear.  - Repeat TTE EF 25% on 8/11, LVH, G3DD, LAE/JAGUAR, moderate RV systolic dysfunction   - Continue CRRT with plan for net neutral I/O today  - Continues to require pressors, currently on levo and vasopressin.     Hypotension  On 10 mg midodrine TID at home. Currently on levophed 0.1 and vaso 0.4 based on a-line. On fentanyl drip, will restart precedex.   - Continue  midodrine  - levophed/vaso - titrate to map 65  - Will monitor closely as patient restarts dialysis today.       Renal/  ESRD on hemodialysis  - continue CRRT for goal of net neutral I/O, CXR improving, vasopressor stable  - appreciate nephro assistance      Immunology/Multi System  Pulmonary sarcoidosis  Stress dose steroids while in septic shock. Started on bactrim per ID with chronic steroid use    Hematology  Current use of long term anticoagulation  - heparin gtt while IP  - no evidence of hepatic dysfunction on labs - minor bili bump      Endocrine  Type 2 diabetes mellitus, without long-term current use of insulin  - Home medication: linagliptin.   - Glucose trended up to 273 while on ISS  - Insulin gtt 2units/hr added  - POCT q1    GI  Constipation  On senna and colace    Other  Class 2 severe obesity due to excess calories with serious comorbidity and body mass index (BMI) of 38.0 to 38.9 in adult  Will need to revisit weight loss when improving  Significant pannus    Hypoalbuminemia  Currently on tube feeds.  - At 30 with a goal of 40.  - On renal novasource     Current chronic use of systemic steroids  - no evidence of PJP ppx on MAR  - on stress dose during septic shock   - On bactrim for PJP prophylaxis    Counseling regarding goals of care  Attempted to discuss goals of care with family on 8/10. Patient's adult children would not like to have this discussion at this time. Patient appears comfortable on vent. Will revisit goals of care discussion with family.       Critical Care Daily Checklist:    A: Awake: RASS Goal/Actual Goal: RASS Goal: -1-->drowsy  Actual: Horton Agitation Sedation Scale (RASS): Drowsy   B: Spontaneous Breathing Trial Performed? Spon. Breathing Trial Initiated?: Initiated (08/10/19 0800)   C: SAT & SBT Coordinated?  No                      D: Delirium: CAM-ICU Overall CAM-ICU: Positive   E: Early Mobility Performed? No   F: Feeding Goal: Goals: Patient to meet > 85% EEN and  EPN by RD follow-up  Status: Nutrition Goal Status: progressing towards goal   Current Diet Order   Procedures    Diet NPO Except for: Medication     Order Specific Question:   Except for     Answer:   Medication      AS: Analgesia/Sedation Precedex, fentanyl   T: Thromboembolic Prophylaxis Heparin gtt   H: HOB > 300 Yes   U: Stress Ulcer Prophylaxis (if needed) Famotidine   G: Glucose Control Insulin gtt   B: Bowel Function     I: Indwelling Catheter (Lines & Yepez) Necessity A-line (femoral), Trialysis, TLC, OG,     D: De-escalation of Antimicrobials/Pharmacotherapies Meropenem, bactrim    Plan for the day/ETD Continue to wean pressors, attempt SBT    Code Status:  Family/Goals of Care: Full Code         Critical secondary to Patient has a condition that poses threat to life and bodily function: Severe Respiratory Distress      Critical care was time spent personally by me on the following activities: development of treatment plan with patient or surrogate and bedside caregivers, discussions with consultants, evaluation of patient's response to treatment, examination of patient, ordering and performing treatments and interventions, ordering and review of laboratory studies, ordering and review of radiographic studies, pulse oximetry, re-evaluation of patient's condition. This critical care time did not overlap with that of any other provider or involve time for any procedures.     Elisabeth Munoz MD  Critical Care Medicine  [facility]

## 2019-08-14 NOTE — PROGRESS NOTES
Late entry    Follow up for ESRD    Pt was seen and examined with renal fellow on call.     Exam   Unchanged from prior  Prior to bedside rounds she was reintubated  She continued to remain on pressors    Labs and volume status were noted and d/w critical care team who felt her volume status was optimized and actually given significant volume taken out already they requested to hold off RRT for the day.

## 2019-08-15 NOTE — NURSING
"Dx: Acute on chronic respiratory failure with hypoxia and hypercapnia    Shift Events: Pt agitated throughout shift. BP drops during episodes. Critical care team aware. Valproate added to scheduled medications. Suppository given. No response yet. A/C, 35%, 5 PEEP. MAP > 65 = goal. Bilateral soft wrist restraints in place for pt safety. Will remove when appropriate. Plan for family meeting     Neuro: Follows Commands and Moves All Extremities    Vital Signs: BP (!) 78/52 (BP Location: Right arm, Patient Position: Lying)   Pulse 69   Temp 97.6 °F (36.4 °C) (Oral)   Resp 18   Ht 5' 8" (1.727 m)   Wt 107.9 kg (237 lb 14 oz)   LMP  (LMP Unknown)   SpO2 99%   Breastfeeding? No   BMI 36.17 kg/m²     Diet: Tube Feeds @ 40 (goal) tolerating.    Gtts: Heparin, Insulin, Levo, Vaso, and Fentanyl    Urine Output: Anuric     CRRT UF @ 350 (goal)     Labs/Accuchecks: accu checks q1h.     Skin: see flowsheet. Plan to get pt on special sizewise bed when CRRT is stopped.          "

## 2019-08-15 NOTE — SUBJECTIVE & OBJECTIVE
Interval History: called back for + fungitell, patient remains on vent, most recent resp culture + ESBL kleb oxytoca, PSA, and proteus. Fungitell 110. No clinical change.     Review of Systems   Unable to perform ROS: Intubated     Objective:     Vital Signs (Most Recent):  Temp: 97.6 °F (36.4 °C) (08/15/19 1500)  Pulse: 68 (08/15/19 1645)  Resp: 18 (08/15/19 1559)  BP: (!) 78/52 (08/15/19 1500)  SpO2: 99 % (08/15/19 1645) Vital Signs (24h Range):  Temp:  [97.4 °F (36.3 °C)-97.8 °F (36.6 °C)] 97.6 °F (36.4 °C)  Pulse:  [63-74] 68  Resp:  [18-19] 18  SpO2:  [91 %-100 %] 99 %  BP: ()/(44-84) 78/52  Arterial Line BP: ()/(47-73) 96/60     Weight: 107.9 kg (237 lb 14 oz)  Body mass index is 36.17 kg/m².    Estimated Creatinine Clearance: 199.6 mL/min (A) (based on SCr of 0.4 mg/dL (L)).    Physical Exam   Constitutional: She appears well-developed and well-nourished. No distress.   HENT:   Right Ear: External ear normal.   Left Ear: External ear normal.   Nose: Nose normal.   Mouth/Throat: Oropharynx is clear and moist.   Neck: Neck supple.   Cardiovascular: Normal rate, regular rhythm, normal heart sounds and intact distal pulses.   No murmur heard.  Pulmonary/Chest: Effort normal. No respiratory distress. She has no wheezes.   Abdominal: Soft. Bowel sounds are normal. She exhibits no distension. There is no tenderness.   Musculoskeletal: She exhibits edema.   Skin: Skin is warm and dry. No rash noted. She is not diaphoretic. No erythema.   Vitals reviewed.      Significant Labs:   CBC:   Recent Labs   Lab 08/14/19  0300 08/15/19  0300 08/15/19  0430   WBC 25.88* 17.75* 18.72*   HGB 7.1* 6.9* 7.3*   HCT 25.0* 24.3* 25.7*   * 119* 134*     CMP:   Recent Labs   Lab 08/14/19  0300  08/14/19  2200 08/15/19  0300 08/15/19  1430     142   < > 140 141  141 138   K 4.5  4.5   < > 4.6 4.4  4.4 4.8     108   < > 106 108  108 108   CO2 27  27   < > 21* 22*  22* 24   *  124*   < > 188*  118*  118* 170*   BUN 4*  4*   < > 9 7  7 4*   CREATININE 0.5  0.5   < > 0.6 0.5  0.5 0.4*   CALCIUM 7.8*  7.8*   < > 8.1* 7.6*  7.6* 7.5*   PROT 6.1  --   --  5.8*  --    ALBUMIN 2.1*  2.1*   < > 2.1* 2.1*  2.1* 2.1*   BILITOT 0.8  --   --  1.0  --    ALKPHOS 108  --   --  96  --    AST 25  --   --  25  --    ALT 15  --   --  15  --    ANIONGAP 7*  7*   < > 13 11  11 6*   EGFRNONAA >60.0  >60.0   < > >60.0 >60.0  >60.0 >60.0    < > = values in this interval not displayed.     Microbiology Results (last 7 days)     Procedure Component Value Units Date/Time    Blood culture [[id number]] Collected:  08/10/19 2043    Order Status:  Completed Specimen:  Blood from Line, Arterial, Right Updated:  08/14/19 2212     Blood Culture, Routine No Growth to date      No Growth to date      No Growth to date      No Growth to date      No Growth to date    Blood culture [[id number]] Collected:  08/10/19 2130    Order Status:  Completed Specimen:  Blood from Peripheral, Antecubital, Right Updated:  08/14/19 2212     Blood Culture, Routine No Growth to date      No Growth to date      No Growth to date      No Growth to date      No Growth to date    Culture, Respiratory [[id number]]  (Abnormal)  (Susceptibility) Collected:  08/09/19 1622    Order Status:  Completed Specimen:  Respiratory from BAL, RML Updated:  08/14/19 1439     Respiratory Culture No S aureus isolated.      KLEBSIELLA OXYTOCA ESBL  Moderate        PSEUDOMONAS AERUGINOSA  Rare  Normal respiratory delio also present       Gram Stain (Respiratory) <10 epithelial cells per low power field.     Gram Stain (Respiratory) Moderate WBC's     Gram Stain (Respiratory) Rare Gram positive cocci     Gram Stain (Respiratory) Rare Gram negative rods    Narrative:       Bronchial Wash    Culture, Respiratory with Gram Stain [[id number]]  (Abnormal)  (Susceptibility) Collected:  08/10/19 2144    Order Status:  Completed Specimen:  Respiratory from Endotracheal Aspirate  Updated:  08/14/19 1238     Respiratory Culture No S aureus isolated.      KLEBSIELLA OXYTOCA ESBL  Moderate        PSEUDOMONAS AERUGINOSA  Few        PROTEUS MIRABILIS  Rare  Normal respiratory delio also present       Gram Stain (Respiratory) No WBC's     Gram Stain (Respiratory) No organisms seen    Fungus culture [[id number]]  (Abnormal) Collected:  08/09/19 1622    Order Status:  Completed Specimen:  Body Fluid from Lung, RML Updated:  08/14/19 1126     Fungus (Mycology) Culture DIANE GLABRATA  Few      Narrative:       Bronchial Wash    Respiratory Viral Panel by PCR [facility]; Sputum (BAL ) [[id number]]  (Abnormal) Collected:  08/09/19 1628    Order Status:  Completed Specimen:  Bronchial Wash Updated:  08/13/19 1156     Respiratory Virus Panel, source Sputum     Comment: BAL        RVP - Adenovirus Not Detected     Comment: Detects Serotypes B and E. Detection of Serotype C may   be limited. If Adenovirus infection is suspected and a   Not Detected result is returned the sample should be   re-tested for Adenovirus using an independent method  (e.g. Fourteen IP Adenovirus Quantitative Real-Time  PCR test.          Enterovirus Positive     Comment: Cross-reactivity has been observed between certain Rhinovirus  strains and the Enterovirus assay.          Human Bocavirus Not Detected     Human Coronavirus Not Detected     Comment: The Human Coronavirus assay detects Human coronavirus types  229E, OC43,NL63 and HKU1.          RVP - Human Metapneumovirus (hMPV) Not Detected     RVP - Influenza A Not Detected     Influenza A - A6X1-11 Not Detected     RVP - Influenza B Not Detected     Parainfluenza Not Detected     Respiratory Syncytial VirusVirus (RSV) A Not Detected     Comment: The Respiratory Syncytial Viral assay detects types A and B,  however it does not distinguish between the two.          RVP - Rhinovirus Not Detected     Comment: Cross-Reactivity has been observed between certain   Rhinovirus strains  and the Enterovirus assay.  Target Enriched Mulitplex Polymerase Chain Reaction (TEM-PCR)  allows for the detection of multiple pathogens out of a single  reaction.  This test was developed and its performance   characteristics determined by [employer].  It has not   been cleared or approved by the U.[place].Food and Drug Administration.  Results should be used in conjunction with clinical findings,   and should not form the sole basis for a diagnosis or treatment  decision.  TEM-PCR is a licensed technology of [employer].         Narrative:       Receiving Lab:->[facility]    Fungus culture [[id number]] Collected:  08/09/19 1622    Order Status:  Completed Specimen:  Bronchial Alveolar Lavage Updated:  08/13/19 0944     Fungus (Mycology) Culture Culture in progress    Narrative:       add on [facility] order [id number] per Dr. Behram Khan  08/10/2019  17:52     AFB Culture & Smear [[id number]] Collected:  08/09/19 1622    Order Status:  Completed Specimen:  Body Fluid from Lung, RML Updated:  08/10/19 2127     AFB Culture & Smear Culture in progress     AFB CULTURE STAIN No acid fast bacilli seen.    Narrative:       Bronchial Wash    Fungus culture [[id number]]     Order Status:  Completed Specimen:  Respiratory from BAL, RLL     Gram stain [[id number]] Collected:  08/09/19 1622    Order Status:  Canceled Specimen:  Body Fluid from Lung, RML     Blood culture [[id number]] Collected:  08/04/19 1402    Order Status:  Completed Specimen:  Blood from Peripheral, Wrist, Right Updated:  08/09/19 1612     Blood Culture, Routine No growth after 5 days.    Blood culture [[id number]] Collected:  08/04/19 1400    Order Status:  Completed Specimen:  Blood from Line, Jugular, Internal Right Updated:  08/09/19 1612     Blood Culture, Routine No growth after 5 days.    Culture, Respiratory with Gram Stain [[id number]]  (Abnormal)  (Susceptibility) Collected:  08/04/19 1505    Order Status:  Completed Specimen:  Respiratory  from Endotracheal Aspirate Updated:  08/09/19 1444     Respiratory Culture PSEUDOMONAS AERUGINOSA  Moderate        STENOTROPHOMONAS (X.) MALTOPHILIA  Moderate        MORAXELLA (BRANHAMELLA) CATARRHALIS  Many  Beta Lactamase positive       Comment: Previous comment was modified by YOSELYN at 14:27 on 08/07/2019  Beta Lactamase positive  Normal respiratory delio also present           STAPHYLOCOCCUS AUREUS  Moderate  Normal respiratory delio also present       Gram Stain (Respiratory) <10 epithelial cells per low power field.     Gram Stain (Respiratory) Many Gram negative diplococci     Gram Stain (Respiratory) Many Gram negative cocci     Gram Stain (Respiratory) Few Gram positive cocci     Gram Stain (Respiratory) Few WBC's          Significant Imaging: I have reviewed all pertinent imaging results/findings within the past 24 hours.

## 2019-08-15 NOTE — ASSESSMENT & PLAN NOTE
58F PMH pulmonary sarcoid on chronic steroids, ESRD on HD presented w/ acute on chronic respiratory failure, now intubated in ICU. ID consulted for management of PNA. ETT culture from 8/4 + MSSA, steno, pseudomonas, and moraxella. CT chest w/o evidence of fungal infection.    Recommendations:  - patient w/o significant clinical change since last evaluation. WBC now down to 18 (previously in 40s)  - recommend 14 day course of meropenem and bactrim as previously outlined  - fungitell slightly elevated to 110, likely related to critical illness and renal replacement (cellulose membranes can cause slight elevations in fungitell)  - do not recommend addition of antifungal medication at this time.     ID will sign off. Please call back if needed.

## 2019-08-15 NOTE — PROGRESS NOTES
Consult received on patient's skin breakdown to left lateral lip. Partial thickness skin loss noted, appears device related due to pressure from ETT tube.   Recommend offloading ETT tube from area, routine oral care per nursing/respiratory therapy, moisturize with sween moisturizer daily/prn.  Discussed with Coreen RENTERIA, nursing to continue care. Wound care to follow prn.     08/15/19 0902        Pressure Injury 08/15/19 0902 Left lateral Lip Mucosal Membrane   Date First Assessed/Time First Assessed: 08/15/19 0902   Pressure Injury Present on Admission: suspected hospital acquired  Side: Left  Orientation: lateral  Location: Lip  Is this injury device related?: Yes  Staging: Mucosal Membrane   Wound Image    Staging Mucosal Membrane   Drainage Amount None   Drainage Characteristics/Odor No odor   Appearance Moist;Pink   Tissue loss description Partial thickness   Periwound Area Intact   Wound Length (cm) 0.5 cm   Wound Width (cm) 0.5 cm   Wound Depth (cm) 0.1 cm   Wound Volume (cm^3) 0.02 cm^3   Wound Surface Area (cm^2) 0.25 cm^2   Care   (recommend offloading ETT tube and routine oral care)

## 2019-08-15 NOTE — PROGRESS NOTES
Pt transported to CT and back via bed, connected to monitoring, and on portable ventilator. 2 RN's, RTT, and a PCT assisted with transport. Pt VVS.

## 2019-08-15 NOTE — SUBJECTIVE & OBJECTIVE
Interval History/Significant Events:   Fungitell came back elevated however CT chest had no evidence of fungal infection. Per ID no antifungal medications are needed at this time.   She has been increasingly anxious and agitated requiring an increase in sedation. Pressor requirements are low however any attempt at stopping the levophed or vaso results in significant hypotension.     Review of Systems   Unable to perform ROS: Intubated     Objective:     Vital Signs (Most Recent):  Temp: 97.6 °F (36.4 °C) (08/15/19 1500)  Pulse: 70 (08/15/19 1530)  Resp: 18 (08/15/19 1307)  BP: (!) 78/52 (08/15/19 1500)  SpO2: 99 % (08/15/19 1530) Vital Signs (24h Range):  Temp:  [97.4 °F (36.3 °C)-97.8 °F (36.6 °C)] 97.6 °F (36.4 °C)  Pulse:  [63-74] 70  Resp:  [18-19] 18  SpO2:  [91 %-100 %] 99 %  BP: ()/(44-84) 78/52  Arterial Line BP: ()/(47-73) 110/59   Weight: 107.9 kg (237 lb 14 oz)  Body mass index is 36.17 kg/m².      Intake/Output Summary (Last 24 hours) at 8/15/2019 1558  Last data filed at 8/15/2019 1500  Gross per 24 hour   Intake 5663.57 ml   Output 6812 ml   Net -1148.43 ml       Physical Exam   Constitutional: She appears ill. No distress. She is sedated, intubated and restrained.   HENT:   Head: Normocephalic and atraumatic.   Right Ear: External ear normal.   Left Ear: External ear normal.   Eyes: Pupils are equal, round, and reactive to light. Conjunctivae and EOM are normal. Right eye exhibits no discharge. Left eye exhibits no discharge.   Neck: Normal range of motion. Neck supple.   trialysis catheter in place. Central line in place   Cardiovascular: Normal rate, normal heart sounds and intact distal pulses.   No murmur heard.  Pulses:       Radial pulses are 2+ on the right side, and 2+ on the left side.   Pulmonary/Chest: Effort normal. No stridor. She is intubated. No respiratory distress. She has no wheezes. She has rales.   Abdominal: Soft. There is no tenderness.   Femoral A-line in place    Musculoskeletal: She exhibits no tenderness.   Neurological: She is alert.   Follows basic commands.   Skin: Skin is warm and dry. She is not diaphoretic. No erythema.   Nursing note and vitals reviewed.      Vents:  Vent Mode: A/C (08/15/19 1307)  Ventilator Initiated: Yes (08/10/19 1742)  Set Rate: 18 bmp (08/15/19 1307)  Vt Set: 400 mL (08/15/19 1307)  Pressure Support: 10 cmH20 (08/12/19 0645)  PEEP/CPAP: 5 cmH20 (08/15/19 1307)  Oxygen Concentration (%): 34 (08/15/19 1530)  Peak Airway Pressure: 28 cmH2O (08/15/19 1307)  Plateau Pressure: 30 cmH20 (08/15/19 1307)  Total Ve: 8.04 mL (08/15/19 1307)  F/VT Ratio<105 (RSBI): (!) 40.36 (08/15/19 1307)  Lines/Drains/Airways     Central Venous Catheter Line                 Percutaneous Central Line Insertion/Assessment - triple lumen  08/04/19 1803 left internal jugular 10 days         Hemodialysis Catheter 08/13/19 0400 right internal jugular 2 days          Drain                 Hemodialysis AV Graft Left upper arm -- days         NG/OG Tube 08/11/19 1600 Center mouth 3 days          Airway                 Airway - Non-Surgical 08/10/19 1756 Endotracheal Tube 4 days          Arterial Line                 Arterial Line 08/13/19 1700 Right Femoral 1 day              Significant Labs:    CBC/Anemia Profile:  Recent Labs   Lab 08/14/19  0300 08/15/19  0300 08/15/19  0430   WBC 25.88* 17.75* 18.72*   HGB 7.1* 6.9* 7.3*   HCT 25.0* 24.3* 25.7*   * 119* 134*   MCV 90 89 91   RDW 23.9* 24.3* 24.4*        Chemistries:  Recent Labs   Lab 08/14/19  0300  08/14/19  2200 08/15/19  0300 08/15/19  1430     142   < > 140 141  141 138   K 4.5  4.5   < > 4.6 4.4  4.4 4.8     108   < > 106 108  108 108   CO2 27  27   < > 21* 22*  22* 24   BUN 4*  4*   < > 9 7  7 4*   CREATININE 0.5  0.5   < > 0.6 0.5  0.5 0.4*   CALCIUM 7.8*  7.8*   < > 8.1* 7.6*  7.6* 7.5*   ALBUMIN 2.1*  2.1*   < > 2.1* 2.1*  2.1* 2.1*   PROT 6.1  --   --  5.8*  --    BILITOT 0.8   --   --  1.0  --    ALKPHOS 108  --   --  96  --    ALT 15  --   --  15  --    AST 25  --   --  25  --    MG 1.8   < > 2.4 1.9 2.0   PHOS 2.0*  2.0*   < > 4.9* 3.1  3.1 1.5*    < > = values in this interval not displayed.       All pertinent labs within the past 24 hours have been reviewed.    Significant Imaging:  I have reviewed and interpreted all pertinent imaging results/findings within the past 24 hours.

## 2019-08-15 NOTE — PROGRESS NOTES
[facility]  Infectious Disease  Progress Note    Patient Name: Sisi Medel  MRN: [id number]  Admission Date: 8/3/2019  Length of Stay: 12 days  Attending Physician: Edward Le*  Primary Care Provider: Carlos A Caputo MD    Isolation Status: Contact  Assessment/Plan:      * Acute on chronic respiratory failure with hypoxia and hypercapnia  58F PMH pulmonary sarcoid on chronic steroids, ESRD on HD presented w/ acute on chronic respiratory failure, now intubated in ICU. ID consulted for management of PNA. ETT culture from 8/4 + MSSA, steno, pseudomonas, and moraxella. CT chest w/o evidence of fungal infection.    Recommendations:  - patient w/o significant clinical change since last evaluation. WBC now down to 18 (previously in 40s)  - recommend 14 day course of meropenem and bactrim as previously outlined  - fungitell slightly elevated to 110, likely related to critical illness and renal replacement (cellulose membranes can cause slight elevations in fungitell)  - do not recommend addition of antifungal medication at this time.     ID will sign off. Please call back if needed.         Anticipated Disposition: TBD    Thank you for your consult. I will sign off. Please contact us if you have any additional questions.      Varsha Lozano DO  Transplant ID  Infectious Disease Fellow  C: [phone]  P: [phone]    Subjective:     Principal Problem:Acute on chronic respiratory failure with hypoxia and hypercapnia    HPI: 58F PMH ESRD on HD, CHF, chronic respiratory failure 2/2 pulmonary sarcoid on chronic prednisone, DM2, Afib who presented from HD w/ worsening respiratory status on 8/3. She is now intubated in ICU. ETT aspirate positive with multiple organisms on 8/4. Patient has been on pip-tazo for several days w/ worsening WBC and continues to require pressors. CXR b/l infiltrates. Culture + moraxella, MSSA, PSA and steno. Patient is unable to provide further history as she  remains intubated. She underwent bronch today w/ repeat culture ordered.  Interval History: called back for + fungitell, patient remains on vent, most recent resp culture + ESBL kleb oxytoca, PSA, and proteus. Fungitell 110. No clinical change.     Review of Systems   Unable to perform ROS: Intubated     Objective:     Vital Signs (Most Recent):  Temp: 97.6 °F (36.4 °C) (08/15/19 1500)  Pulse: 68 (08/15/19 1645)  Resp: 18 (08/15/19 1559)  BP: (!) 78/52 (08/15/19 1500)  SpO2: 99 % (08/15/19 1645) Vital Signs (24h Range):  Temp:  [97.4 °F (36.3 °C)-97.8 °F (36.6 °C)] 97.6 °F (36.4 °C)  Pulse:  [63-74] 68  Resp:  [18-19] 18  SpO2:  [91 %-100 %] 99 %  BP: ()/(44-84) 78/52  Arterial Line BP: ()/(47-73) 96/60     Weight: 107.9 kg (237 lb 14 oz)  Body mass index is 36.17 kg/m².    Estimated Creatinine Clearance: 199.6 mL/min (A) (based on SCr of 0.4 mg/dL (L)).    Physical Exam   Constitutional: She appears well-developed and well-nourished. No distress.   HENT:   Right Ear: External ear normal.   Left Ear: External ear normal.   Nose: Nose normal.   Mouth/Throat: Oropharynx is clear and moist.   Neck: Neck supple.   Cardiovascular: Normal rate, regular rhythm, normal heart sounds and intact distal pulses.   No murmur heard.  Pulmonary/Chest: Effort normal. No respiratory distress. She has no wheezes.   Abdominal: Soft. Bowel sounds are normal. She exhibits no distension. There is no tenderness.   Musculoskeletal: She exhibits edema.   Skin: Skin is warm and dry. No rash noted. She is not diaphoretic. No erythema.   Vitals reviewed.      Significant Labs:   CBC:   Recent Labs   Lab 08/14/19  0300 08/15/19  0300 08/15/19  0430   WBC 25.88* 17.75* 18.72*   HGB 7.1* 6.9* 7.3*   HCT 25.0* 24.3* 25.7*   * 119* 134*     CMP:   Recent Labs   Lab 08/14/19  0300  08/14/19  2200 08/15/19  0300 08/15/19  1430     142   < > 140 141  141 138   K 4.5  4.5   < > 4.6 4.4  4.4 4.8     108   < > 106 108   108 108   CO2 27  27   < > 21* 22*  22* 24   *  124*   < > 188* 118*  118* 170*   BUN 4*  4*   < > 9 7  7 4*   CREATININE 0.5  0.5   < > 0.6 0.5  0.5 0.4*   CALCIUM 7.8*  7.8*   < > 8.1* 7.6*  7.6* 7.5*   PROT 6.1  --   --  5.8*  --    ALBUMIN 2.1*  2.1*   < > 2.1* 2.1*  2.1* 2.1*   BILITOT 0.8  --   --  1.0  --    ALKPHOS 108  --   --  96  --    AST 25  --   --  25  --    ALT 15  --   --  15  --    ANIONGAP 7*  7*   < > 13 11  11 6*   EGFRNONAA >60.0  >60.0   < > >60.0 >60.0  >60.0 >60.0    < > = values in this interval not displayed.     Microbiology Results (last 7 days)     Procedure Component Value Units Date/Time    Blood culture [[id number]] Collected:  08/10/19 2043    Order Status:  Completed Specimen:  Blood from Line, Arterial, Right Updated:  08/14/19 2212     Blood Culture, Routine No Growth to date      No Growth to date      No Growth to date      No Growth to date      No Growth to date    Blood culture [[id number]] Collected:  08/10/19 2130    Order Status:  Completed Specimen:  Blood from Peripheral, Antecubital, Right Updated:  08/14/19 2212     Blood Culture, Routine No Growth to date      No Growth to date      No Growth to date      No Growth to date      No Growth to date    Culture, Respiratory [[id number]]  (Abnormal)  (Susceptibility) Collected:  08/09/19 1622    Order Status:  Completed Specimen:  Respiratory from BAL, RML Updated:  08/14/19 1439     Respiratory Culture No S aureus isolated.      KLEBSIELLA OXYTOCA ESBL  Moderate        PSEUDOMONAS AERUGINOSA  Rare  Normal respiratory delio also present       Gram Stain (Respiratory) <10 epithelial cells per low power field.     Gram Stain (Respiratory) Moderate WBC's     Gram Stain (Respiratory) Rare Gram positive cocci     Gram Stain (Respiratory) Rare Gram negative rods    Narrative:       Bronchial Wash    Culture, Respiratory with Gram Stain [[id number]]  (Abnormal)  (Susceptibility) Collected:  08/10/19 2144     Order Status:  Completed Specimen:  Respiratory from Endotracheal Aspirate Updated:  08/14/19 1238     Respiratory Culture No S aureus isolated.      KLEBSIELLA OXYTOCA ESBL  Moderate        PSEUDOMONAS AERUGINOSA  Few        PROTEUS MIRABILIS  Rare  Normal respiratory delio also present       Gram Stain (Respiratory) No WBC's     Gram Stain (Respiratory) No organisms seen    Fungus culture [[id number]]  (Abnormal) Collected:  08/09/19 1622    Order Status:  Completed Specimen:  Body Fluid from Lung, RML Updated:  08/14/19 1126     Fungus (Mycology) Culture DIANE GLABRATA  Few      Narrative:       Bronchial Wash    Respiratory Viral Panel by PCR [facility]; Sputum (BAL ) [[id number]]  (Abnormal) Collected:  08/09/19 1628    Order Status:  Completed Specimen:  Bronchial Wash Updated:  08/13/19 1156     Respiratory Virus Panel, source Sputum     Comment: BAL        RVP - Adenovirus Not Detected     Comment: Detects Serotypes B and E. Detection of Serotype C may   be limited. If Adenovirus infection is suspected and a   Not Detected result is returned the sample should be   re-tested for Adenovirus using an independent method  (e.g. Kaola100 Adenovirus Quantitative Real-Time  PCR test.          Enterovirus Positive     Comment: Cross-reactivity has been observed between certain Rhinovirus  strains and the Enterovirus assay.          Human Bocavirus Not Detected     Human Coronavirus Not Detected     Comment: The Human Coronavirus assay detects Human coronavirus types  229E, OC43,NL63 and HKU1.          RVP - Human Metapneumovirus (hMPV) Not Detected     RVP - Influenza A Not Detected     Influenza A - M0P6-04 Not Detected     RVP - Influenza B Not Detected     Parainfluenza Not Detected     Respiratory Syncytial VirusVirus (RSV) A Not Detected     Comment: The Respiratory Syncytial Viral assay detects types A and B,  however it does not distinguish between the two.          RVP - Rhinovirus Not Detected      Comment: Cross-Reactivity has been observed between certain   Rhinovirus strains and the Enterovirus assay.  Target Enriched Mulitplex Polymerase Chain Reaction (TEM-PCR)  allows for the detection of multiple pathogens out of a single  reaction.  This test was developed and its performance   characteristics determined by [employer].  It has not   been cleared or approved by the U.S.Food and Drug Administration.  Results should be used in conjunction with clinical findings,   and should not form the sole basis for a diagnosis or treatment  decision.  TEM-PCR is a licensed technology of [employer].         Narrative:       Receiving Lab:->[facility]    Fungus culture [[id number]] Collected:  08/09/19 1622    Order Status:  Completed Specimen:  Bronchial Alveolar Lavage Updated:  08/13/19 0944     Fungus (Mycology) Culture Culture in progress    Narrative:       add on [facility] order [id number] per Dr. Behram Khan  08/10/2019  17:52     AFB Culture & Smear [[id number]] Collected:  08/09/19 1622    Order Status:  Completed Specimen:  Body Fluid from Lung, RML Updated:  08/10/19 2127     AFB Culture & Smear Culture in progress     AFB CULTURE STAIN No acid fast bacilli seen.    Narrative:       Bronchial Wash    Fungus culture [[id number]]     Order Status:  Completed Specimen:  Respiratory from BAL, RLL     Gram stain [[id number]] Collected:  08/09/19 1622    Order Status:  Canceled Specimen:  Body Fluid from Lung, RML     Blood culture [[id number]] Collected:  08/04/19 1402    Order Status:  Completed Specimen:  Blood from Peripheral, Wrist, Right Updated:  08/09/19 1612     Blood Culture, Routine No growth after 5 days.    Blood culture [[id number]] Collected:  08/04/19 1400    Order Status:  Completed Specimen:  Blood from Line, Jugular, Internal Right Updated:  08/09/19 1612     Blood Culture, Routine No growth after 5 days.    Culture, Respiratory with Gram Stain [[id number]]  (Abnormal)  (Susceptibility)  Collected:  08/04/19 1505    Order Status:  Completed Specimen:  Respiratory from Endotracheal Aspirate Updated:  08/09/19 1444     Respiratory Culture PSEUDOMONAS AERUGINOSA  Moderate        STENOTROPHOMONAS (X.) MALTOPHILIA  Moderate        MORAXELLA (BRANHAMELLA) CATARRHALIS  Many  Beta Lactamase positive       Comment: Previous comment was modified by YOSELYN at 14:27 on 08/07/2019  Beta Lactamase positive  Normal respiratory delio also present           STAPHYLOCOCCUS AUREUS  Moderate  Normal respiratory delio also present       Gram Stain (Respiratory) <10 epithelial cells per low power field.     Gram Stain (Respiratory) Many Gram negative diplococci     Gram Stain (Respiratory) Many Gram negative cocci     Gram Stain (Respiratory) Few Gram positive cocci     Gram Stain (Respiratory) Few WBC's          Significant Imaging: I have reviewed all pertinent imaging results/findings within the past 24 hours.

## 2019-08-15 NOTE — PROGRESS NOTES
[facility]  Nephrology  Progress Note    Patient Name: Sisi Medel  MRN: [id number]  Admission Date: 8/3/2019  Hospital Length of Stay: 12 days  Attending Provider: Edward Le*   Primary Care Physician: Carlos A Caputo MD  Principal Problem:Acute on chronic respiratory failure with hypoxia and hypercapnia      Interval History: Pt seen on SLED this morning. I/O's do not appear accurate. Estimate net positive about 0.7L for the past 24hrs. Pt remains sedated and ventilated, on Fi02 of 35%. Still requiring levo and vaso to maintain adequate BP. She nods/gestures appropriately and is following commands.     Review of patient's allergies indicates:   Allergen Reactions    Bactrim [sulfamethoxazole-trimethoprim] Other (See Comments)     Causes renal failure    Nsaids (non-steroidal anti-inflammatory drug) Other (See Comments)     Renal failure     Current Facility-Administered Medications   Medication Frequency    0.9%  NaCl infusion (CRRT USE ONLY) Continuous    acetaminophen tablet 650 mg Q4H PRN    albuterol-ipratropium 2.5 mg-0.5 mg/3 mL nebulizer solution 3 mL Q6H PRN    chlorhexidine 0.12 % solution 15 mL BID    cinacalcet tablet 30 mg Daily with breakfast    clopidogrel tablet 75 mg Daily    dexmedetomidine (PRECEDEX) 400mcg/100mL 0.9% NaCL infusion Continuous    dextrose 10% (D10W) Bolus PRN    dextrose 10% (D10W) Bolus PRN    dextrose 50% injection 25 g PRN    epoetin lily-epbx injection 5,500 Units Every Mon, Wed, Fri    famotidine tablet 20 mg Daily    fentaNYL 2500 mcg in 0.9% sodium chloride 250 mL infusion premix (titrating) Continuous    fentaNYL injection 25 mcg Q1H PRN    Followed by    fentaNYL injection 25 mcg Q1H PRN    fludrocortisone tablet 100 mcg Daily    glucagon (human recombinant) injection 1 mg PRN    glucose chewable tablet 16 g PRN    glucose chewable tablet 24 g PRN    heparin 25,000 units in dextrose 5% (100 units/ml) IV  bolus from bag - ADDITIONAL PRN BOLUS - 30 units/kg PRN    heparin 25,000 units in dextrose 5% (100 units/ml) IV bolus from bag - ADDITIONAL PRN BOLUS - 60 units/kg PRN    heparin 25,000 units in dextrose 5% 250 mL (100 units/mL) infusion LOW INTENSITY nomogram - [facility] Continuous    hydrocortisone sodium succinate injection 100 mg Q8H    insulin regular (Humulin R) 100 Units in sodium chloride 0.9% 100 mL infusion Continuous    levETIRAcetam in NaCl (iso-os) IVPB 500 mg Q12H    LORazepam tablet 0.5 mg Q8H PRN    magnesium sulfate 2g in water 50mL IVPB (premix) PRN    meropenem (MERREM) 2 g in sodium chloride 0.9% 100 mL IVPB Q8H    miconazole NITRATE 2 % top powder BID    midodrine tablet 10 mg TID    norepinephrine 4 mg in dextrose 5% 250 mL infusion (premix) (titrating) Continuous    ondansetron disintegrating tablet 8 mg Q8H PRN    polyethylene glycol packet 17 g Daily    prednisoLONE acetate 1 % ophthalmic suspension 1 drop TID    senna-docusate 8.6-50 mg per tablet 1 tablet BID    sevelamer carbonate tablet 800 mg Daily with dinner    sodium chloride 0.9% flush 10 mL PRN    sodium phosphate 20.01 mmol in dextrose 5 % 250 mL IVPB PRN    sodium phosphate 30 mmol in dextrose 5 % 250 mL IVPB PRN    sodium phosphate 39.99 mmol in dextrose 5 % 250 mL IVPB PRN    sulfamethoxazole-trimethoprim 800-160mg per tablet 1 tablet BID    tiZANidine tablet 4 mg Nightly PRN    vasopressin (PITRESSIN) 0.2 Units/mL in dextrose 5 % 100 mL infusion Continuous     Facility-Administered Medications Ordered in Other Encounters   Medication Frequency    ceFAZolin injection 2 g On Call Procedure    lidocaine (PF) 10 mg/ml (1%) injection 10 mg Once    mupirocin 2 % ointment On Call Procedure    sodium chloride 0.9% flush 10 mL PRN       Objective:     Vital Signs (Most Recent):  Temp: 97.4 °F (36.3 °C) (08/15/19 0700)  Pulse: 69 (08/15/19 0930)  Resp: 18 (08/15/19 0918)  BP: (!) 89/62 (08/15/19 0900)  SpO2: 100 %  (08/15/19 0930)  O2 Device (Oxygen Therapy): ventilator (08/15/19 0918) Vital Signs (24h Range):  Temp:  [97.4 °F (36.3 °C)-98.1 °F (36.7 °C)] 97.4 °F (36.3 °C)  Pulse:  [62-74] 69  Resp:  [18-19] 18  SpO2:  [91 %-100 %] 100 %  BP: ()/(44-84) 89/62  Arterial Line BP: ()/(48-73) 122/69     Weight: 107.9 kg (237 lb 14 oz) (08/15/19 0400)  Body mass index is 36.17 kg/m².  Body surface area is 2.28 meters squared.    I/O last 3 completed shifts:  In: 9068.7 [I.V.:8388.7; NG/GT:680]  Out: 65308 [Drains:700; Other:52616]    Physical Exam   Constitutional: She appears well-developed and well-nourished. No distress.   HENT:   Head: Normocephalic and atraumatic.   Eyes: Pupils are equal, round, and reactive to light. Conjunctivae and EOM are normal.   Neck: Normal range of motion. Neck supple.   Cardiovascular: Normal rate, regular rhythm and normal heart sounds.   Pulmonary/Chest: Effort normal. No stridor. No respiratory distress. She has no wheezes. She has rales.   Abdominal: Soft. Bowel sounds are normal.   Musculoskeletal: Normal range of motion. She exhibits edema (1+ pitting edema to BLE). She exhibits no tenderness or deformity.   Neurological:   MAGNUS; intubated/sedated   Skin: Skin is warm and dry. She is not diaphoretic.   Psychiatric:   MAGNUS, Pt intubated/sedated       Significant Labs:  ABGs:   Recent Labs   Lab 08/15/19  0308   PH 7.436   PCO2 35.0   HCO3 23.6*   POCSATURATED 99   BE -1     CBC:   Recent Labs   Lab 08/15/19  0430   WBC 18.72*   RBC 2.84*   HGB 7.3*   HCT 25.7*   *   MCV 91   MCH 25.7*   MCHC 28.4*     CMP:   Recent Labs   Lab 08/15/19  0300   *  118*   CALCIUM 7.6*  7.6*   ALBUMIN 2.1*  2.1*   PROT 5.8*     141   K 4.4  4.4   CO2 22*  22*     108   BUN 7  7   CREATININE 0.5  0.5   ALKPHOS 96   ALT 15   AST 25   BILITOT 1.0     All labs within the past 24 hours have been reviewed.         Assessment/Plan:     ESRD on hemodialysis  Outpatient HD unit:  [facility]   -Nephrologist: Patient does not know  -HD tx days: TThS  -HD tx time: 4hrs 45mins    -HD access: LUE AVG  -HD modality: iHD  -Residual urine: Minimal  -EDW: 109 kg     Plan:  - Continuous SLED, will evaluate dialy   - UFG keep pt net even (300/350cc/hr)  - Pt on heparin infusion, no need for citrate   - Epo ordered   - Strict I/Os and chart  - Will follow closely        Thank you for your consult. I will follow-up with patient. Please contact us if you have any additional questions.    Dinh Rodriguez, NP  Nephrology  [facility]

## 2019-08-15 NOTE — PLAN OF CARE
Problem: Adult Inpatient Plan of Care  Goal: Plan of Care Review  Outcome: Ongoing (interventions implemented as appropriate)  Pt remained intubated overnight. AC/VC+, 35%, 5-peep, rate-18.  O2 > 94%.     HR: 69-70's paced rhythm.     MAP: > 65 per leticia.      A-febrile.  Gtts: Fentanyl, Heparin, Insulin, Levo, Precedex, and Vaso.   TF at goal of 40cc/hr with minimal residuals.   Pt anuric.   No BM this shift.   CRRT tolerated overnight at -350.   CT of the chest obtained, and insulin gtt restarted this shift.   Pt follows commands, and moves all extremities purposefully. Pt free of falls and additional skin breakdown at this time.   Plan of care reviewed with pt and family, all questions answered.

## 2019-08-15 NOTE — SUBJECTIVE & OBJECTIVE
Interval History: Pt seen on SLED this morning. I/O's do not appear accurate. Estimate net positive about 0.7L for the past 24hrs. Pt remains sedated and ventilated, on Fi02 of 35%. Still requiring levo and vaso to maintain adequate BP. She nods/gestures appropriately and is following commands.     Review of patient's allergies indicates:   Allergen Reactions    Bactrim [sulfamethoxazole-trimethoprim] Other (See Comments)     Causes renal failure    Nsaids (non-steroidal anti-inflammatory drug) Other (See Comments)     Renal failure     Current Facility-Administered Medications   Medication Frequency    0.9%  NaCl infusion (CRRT USE ONLY) Continuous    acetaminophen tablet 650 mg Q4H PRN    albuterol-ipratropium 2.5 mg-0.5 mg/3 mL nebulizer solution 3 mL Q6H PRN    chlorhexidine 0.12 % solution 15 mL BID    cinacalcet tablet 30 mg Daily with breakfast    clopidogrel tablet 75 mg Daily    dexmedetomidine (PRECEDEX) 400mcg/100mL 0.9% NaCL infusion Continuous    dextrose 10% (D10W) Bolus PRN    dextrose 10% (D10W) Bolus PRN    dextrose 50% injection 25 g PRN    epoetin lily-epbx injection 5,500 Units Every Mon, Wed, Fri    famotidine tablet 20 mg Daily    fentaNYL 2500 mcg in 0.9% sodium chloride 250 mL infusion premix (titrating) Continuous    fentaNYL injection 25 mcg Q1H PRN    Followed by    fentaNYL injection 25 mcg Q1H PRN    fludrocortisone tablet 100 mcg Daily    glucagon (human recombinant) injection 1 mg PRN    glucose chewable tablet 16 g PRN    glucose chewable tablet 24 g PRN    heparin 25,000 units in dextrose 5% (100 units/ml) IV bolus from bag - ADDITIONAL PRN BOLUS - 30 units/kg PRN    heparin 25,000 units in dextrose 5% (100 units/ml) IV bolus from bag - ADDITIONAL PRN BOLUS - 60 units/kg PRN    heparin 25,000 units in dextrose 5% 250 mL (100 units/mL) infusion LOW INTENSITY nomogram - [facility] Continuous    hydrocortisone sodium succinate injection 100 mg Q8H    insulin regular  (Humulin R) 100 Units in sodium chloride 0.9% 100 mL infusion Continuous    levETIRAcetam in NaCl (iso-os) IVPB 500 mg Q12H    LORazepam tablet 0.5 mg Q8H PRN    magnesium sulfate 2g in water 50mL IVPB (premix) PRN    meropenem (MERREM) 2 g in sodium chloride 0.9% 100 mL IVPB Q8H    miconazole NITRATE 2 % top powder BID    midodrine tablet 10 mg TID    norepinephrine 4 mg in dextrose 5% 250 mL infusion (premix) (titrating) Continuous    ondansetron disintegrating tablet 8 mg Q8H PRN    polyethylene glycol packet 17 g Daily    prednisoLONE acetate 1 % ophthalmic suspension 1 drop TID    senna-docusate 8.6-50 mg per tablet 1 tablet BID    sevelamer carbonate tablet 800 mg Daily with dinner    sodium chloride 0.9% flush 10 mL PRN    sodium phosphate 20.01 mmol in dextrose 5 % 250 mL IVPB PRN    sodium phosphate 30 mmol in dextrose 5 % 250 mL IVPB PRN    sodium phosphate 39.99 mmol in dextrose 5 % 250 mL IVPB PRN    sulfamethoxazole-trimethoprim 800-160mg per tablet 1 tablet BID    tiZANidine tablet 4 mg Nightly PRN    vasopressin (PITRESSIN) 0.2 Units/mL in dextrose 5 % 100 mL infusion Continuous     Facility-Administered Medications Ordered in Other Encounters   Medication Frequency    ceFAZolin injection 2 g On Call Procedure    lidocaine (PF) 10 mg/ml (1%) injection 10 mg Once    mupirocin 2 % ointment On Call Procedure    sodium chloride 0.9% flush 10 mL PRN       Objective:     Vital Signs (Most Recent):  Temp: 97.4 °F (36.3 °C) (08/15/19 0700)  Pulse: 69 (08/15/19 0930)  Resp: 18 (08/15/19 0918)  BP: (!) 89/62 (08/15/19 0900)  SpO2: 100 % (08/15/19 0930)  O2 Device (Oxygen Therapy): ventilator (08/15/19 0918) Vital Signs (24h Range):  Temp:  [97.4 °F (36.3 °C)-98.1 °F (36.7 °C)] 97.4 °F (36.3 °C)  Pulse:  [62-74] 69  Resp:  [18-19] 18  SpO2:  [91 %-100 %] 100 %  BP: ()/(44-84) 89/62  Arterial Line BP: ()/(48-73) 122/69     Weight: 107.9 kg (237 lb 14 oz) (08/15/19 0400)  Body  mass index is 36.17 kg/m².  Body surface area is 2.28 meters squared.    I/O last 3 completed shifts:  In: 9068.7 [I.V.:8388.7; NG/GT:680]  Out: 56164 [Drains:700; Other:77297]    Physical Exam   Constitutional: She appears well-developed and well-nourished. No distress.   HENT:   Head: Normocephalic and atraumatic.   Eyes: Pupils are equal, round, and reactive to light. Conjunctivae and EOM are normal.   Neck: Normal range of motion. Neck supple.   Cardiovascular: Normal rate, regular rhythm and normal heart sounds.   Pulmonary/Chest: Effort normal. No stridor. No respiratory distress. She has no wheezes. She has rales.   Abdominal: Soft. Bowel sounds are normal.   Musculoskeletal: Normal range of motion. She exhibits edema (1+ pitting edema to BLE). She exhibits no tenderness or deformity.   Neurological:   MAGNUS; intubated/sedated   Skin: Skin is warm and dry. She is not diaphoretic.   Psychiatric:   MAGNUS, Pt intubated/sedated       Significant Labs:  ABGs:   Recent Labs   Lab 08/15/19  0308   PH 7.436   PCO2 35.0   HCO3 23.6*   POCSATURATED 99   BE -1     CBC:   Recent Labs   Lab 08/15/19  0430   WBC 18.72*   RBC 2.84*   HGB 7.3*   HCT 25.7*   *   MCV 91   MCH 25.7*   MCHC 28.4*     CMP:   Recent Labs   Lab 08/15/19  0300   *  118*   CALCIUM 7.6*  7.6*   ALBUMIN 2.1*  2.1*   PROT 5.8*     141   K 4.4  4.4   CO2 22*  22*     108   BUN 7  7   CREATININE 0.5  0.5   ALKPHOS 96   ALT 15   AST 25   BILITOT 1.0     All labs within the past 24 hours have been reviewed.

## 2019-08-15 NOTE — ASSESSMENT & PLAN NOTE
Outpatient HD unit: [facility]   -Nephrologist: Patient does not know  -HD tx days: TThS  -HD tx time: 4hrs 45mins    -HD access: LUE AVG  -HD modality: iHD  -Residual urine: Minimal  -EDW: 109 kg     Plan:  - Continuous SLED, will evaluate dialy   - UFG keep pt net even (300/350cc/hr)  - Pt on heparin infusion, no need for citrate   - Epo ordered   - Strict I/Os and chart  - Will follow closely

## 2019-08-16 NOTE — ASSESSMENT & PLAN NOTE
Stress dose steroids while in septic shock. Started on bactrim per ID with chronic steroid use for PCP prophylaxis.

## 2019-08-16 NOTE — ASSESSMENT & PLAN NOTE
Outpatient HD unit: [facility]   -Nephrologist: Patient does not know  -HD tx days: TThS  -HD tx time: 4hrs 45mins    -HD access: LUE AVG  -HD modality: iHD  -Residual urine: Minimal  -EDW: 109 kg     Plan:  - Will switch to SCUF and continue for volume management. Patient remains hypervolemic on assessment and CXR.   - Target -400 ml/hr as tolerated by BP, keep MAP >65. Titrate drips as indicated.   - Recommend repeating CXR  - Currently on 2 pressors : Levo and Vaso   - Continue on mechanical ventilation with FiO at 35%  - Pt on heparin infusion, no need for citrate   - Epo ordered   - Continue to monitor intake and output, daily weights   - Avoid nephrotoxic medication and renal dose medications to GFR  - Will follow closely

## 2019-08-16 NOTE — ASSESSMENT & PLAN NOTE
Patient has hx of HF (EF 35%). CXR in the ED suggestive of HF. BNP 1424 and troponins 0.156.  Adherence to home medication regimen is unclear.  - Repeat TTE EF 25% on 8/11, LVH, G3DD, LAE/JAGUAR, moderate RV systolic dysfunction   - Continue CRRT  - Pressor requirements decreasing, currently on levo and vasopressin.

## 2019-08-16 NOTE — ASSESSMENT & PLAN NOTE
Course lung sounds and CXR concerning for PNA in LLL with interstitial fullness with leukocytosis without fevers. Respiratory cultures positive. WBC greatly improved   - ETT culture from 8/4 + MSSA, PSA and moraxella   - continue bactrim for PJP prophylaxis w/ steroid use   - respiratory cx positive for ESBL klebsiella oxytoca - on meropenem   - AFB, PJP negative   - Blood cultures no growth to date   - trend WBC (currently downtrending)   - ID following

## 2019-08-16 NOTE — PLAN OF CARE
Problem: Adult Inpatient Plan of Care  Goal: Plan of Care Review  Outcome: Ongoing (interventions implemented as appropriate)  Patient on A/C ventilation, 35% and 5 of PEEP, SATs % through shift, MAPs maintained above 65 through shift, afebrile, paced at 70. Patient on levo, vaso, heparin, insulin, propofol, precedex, fentanyl; see flow sheet for drip rates. Patient on CRRT, UF goal 350, restarted at 0600. Labs sent, lytes replaced per MD orders. Patient moved to air overlay bed via wound care recs. Tube feeds at 40 ml/hr, minimal residuals, 2 bowel movements through shift. Patient able to follow commands and wakes easily on current sedation, all questions answered by RN, will continue to monitor.

## 2019-08-16 NOTE — PROGRESS NOTES
[facility]  Nephrology  Progress Note    Patient Name: Sisi Medel  MRN: [id number]  Admission Date: 8/3/2019  Hospital Length of Stay: 13 days  Attending Provider: Edward Le*   Primary Care Physician: Carlos A Caputo MD  Principal Problem:Acute on chronic respiratory failure with hypoxia and hypercapnia    Subjective:     Interval History: Seen on SLED this AM. No issues noted. Patient net positive ~ 900 ml/24 hrs. UFR at 350 ml/hr. Patient taking in about ~130 ml/hr given drips. Remains intubated, FiO2 35% but responsive to direct commands. BP stable on Levophed 0.04 mcg and Vasopressin 0.04 mcg. Electrolytes and acid base stable.     Review of patient's allergies indicates:   Allergen Reactions    Bactrim [sulfamethoxazole-trimethoprim] Other (See Comments)     Causes renal failure    Nsaids (non-steroidal anti-inflammatory drug) Other (See Comments)     Renal failure     Current Facility-Administered Medications   Medication Frequency    0.9%  NaCl infusion (CRRT USE ONLY) Continuous    acetaminophen tablet 650 mg Q4H PRN    albuterol-ipratropium 2.5 mg-0.5 mg/3 mL nebulizer solution 3 mL Q6H PRN    chlorhexidine 0.12 % solution 15 mL BID    cinacalcet tablet 30 mg Daily with breakfast    clopidogrel tablet 75 mg Daily    dexmedetomidine (PRECEDEX) 400mcg/100mL 0.9% NaCL infusion Continuous    dextrose 10% (D10W) Bolus PRN    dextrose 10% (D10W) Bolus PRN    dextrose 50% injection 25 g PRN    epoetin lily-epbx injection 5,500 Units Every Mon, Wed, Fri    famotidine tablet 20 mg Daily    fentaNYL 2500 mcg in 0.9% sodium chloride 250 mL infusion premix (titrating) Continuous    fentaNYL injection 25 mcg Q1H PRN    fludrocortisone tablet 100 mcg Daily    glucagon (human recombinant) injection 1 mg PRN    glucose chewable tablet 16 g PRN    glucose chewable tablet 24 g PRN    heparin 25,000 units in dextrose 5% (100 units/ml) IV bolus from bag -  ADDITIONAL PRN BOLUS - 30 units/kg PRN    heparin 25,000 units in dextrose 5% (100 units/ml) IV bolus from bag - ADDITIONAL PRN BOLUS - 60 units/kg PRN    heparin 25,000 units in dextrose 5% 250 mL (100 units/mL) infusion LOW INTENSITY nomogram - [facility] Continuous    hydrocortisone sodium succinate injection 100 mg Q8H    insulin regular (Humulin R) 100 Units in sodium chloride 0.9% 100 mL infusion Continuous    levETIRAcetam in NaCl (iso-os) IVPB 500 mg Q12H    LORazepam tablet 0.5 mg Q8H PRN    magnesium sulfate 2g in water 50mL IVPB (premix) PRN    meropenem (MERREM) 2 g in sodium chloride 0.9% 100 mL IVPB Q8H    miconazole NITRATE 2 % top powder BID    midodrine tablet 10 mg TID    norepinephrine 4 mg in dextrose 5% 250 mL infusion (premix) (titrating) Continuous    ondansetron disintegrating tablet 8 mg Q8H PRN    polyethylene glycol packet 17 g BID    prednisoLONE acetate 1 % ophthalmic suspension 1 drop TID    propofol (DIPRIVAN) 10 mg/mL infusion Continuous    senna-docusate 8.6-50 mg per tablet 1 tablet BID    sodium chloride 0.9% flush 10 mL PRN    sodium phosphate 20.01 mmol in dextrose 5 % 250 mL IVPB PRN    sodium phosphate 30 mmol in dextrose 5 % 250 mL IVPB PRN    sodium phosphate 39.99 mmol in dextrose 5 % 250 mL IVPB PRN    sulfamethoxazole-trimethoprim 800-160mg per tablet 1 tablet BID    tiZANidine tablet 4 mg Nightly PRN    valproate (DEPACON) 250 mg in dextrose 5 % 50 mL IVPB Q8H    vasopressin (PITRESSIN) 0.2 Units/mL in dextrose 5 % 100 mL infusion Continuous     Facility-Administered Medications Ordered in Other Encounters   Medication Frequency    ceFAZolin injection 2 g On Call Procedure    lidocaine (PF) 10 mg/ml (1%) injection 10 mg Once    mupirocin 2 % ointment On Call Procedure    sodium chloride 0.9% flush 10 mL PRN       Objective:     Vital Signs (Most Recent):  Temp: 97.9 °F (36.6 °C) (08/16/19 0300)  Pulse: 70 (08/16/19 0902)  Resp: 18 (08/15/19 1724)  BP:  (!) 116/94 (08/15/19 2020)  SpO2: 99 % (08/16/19 0902)  O2 Device (Oxygen Therapy): ventilator (08/16/19 0711) Vital Signs (24h Range):  Temp:  [97.6 °F (36.4 °C)-97.9 °F (36.6 °C)] 97.9 °F (36.6 °C)  Pulse:  [64-75] 70  Resp:  [18-19] 18  SpO2:  [90 %-100 %] 99 %  BP: ()/(51-94) 116/94  Arterial Line BP: ()/(47-75) 103/62     Weight: 131.5 kg (290 lb) (08/16/19 0400)  Body mass index is 44.09 kg/m².  Body surface area is 2.51 meters squared.    I/O last 3 completed shifts:  In: 36280.6 [I.V.:28914.6; NG/GT:1390]  Out: 9885 [Drains:40; Other:9845]    Physical Exam   Constitutional: She appears well-developed and well-nourished. No distress. She is sedated and intubated.   HENT:   Right Ear: External ear normal.   Left Ear: External ear normal.   Nose: Nose normal.   Mouth/Throat: Oropharynx is clear and moist.   Neck: Neck supple.   Cardiovascular: Normal rate, regular rhythm, normal heart sounds and intact distal pulses.   No murmur heard.  Pulmonary/Chest: Effort normal. She is intubated. No respiratory distress. She has no wheezes. She has rales.   Abdominal: Soft. Bowel sounds are normal. She exhibits no distension. There is no tenderness.   Musculoskeletal: She exhibits edema.   Skin: Skin is warm and dry. No rash noted. She is not diaphoretic. No erythema.   Vitals reviewed.    Significant Labs:  CBC:   Recent Labs   Lab 08/16/19  0353   WBC 18.21*   RBC 2.85*   HGB 7.3*   HCT 25.7*      MCV 90   MCH 25.6*   MCHC 28.4*     CMP:   Recent Labs   Lab 08/16/19  0353   *  253*   CALCIUM 7.7*  7.7*   ALBUMIN 2.1*  2.1*   PROT 5.7*     137   K 4.8  4.8   CO2 21*  21*     107   BUN 7  7   CREATININE 0.5  0.5   ALKPHOS 109   ALT 17   AST 24   BILITOT 1.0          Assessment/Plan:     * Acute on chronic respiratory failure with hypoxia and hypercapnia  - Managed by primary team     ESRD on hemodialysis  Outpatient HD unit: [facility]   -Nephrologist: Patient does not  know  -HD tx days: TThS  -HD tx time: 4hrs 45mins    -HD access: LUE AVG  -HD modality: iHD  -Residual urine: Minimal  -EDW: 109 kg     Plan:  - Will switch to SCUF and continue for volume management. Patient remains hypervolemic on assessment and CXR.   - Target -400 ml/hr as tolerated by BP, keep MAP >65. Titrate drips as indicated.   - Recommend repeating CXR  - Currently on 2 pressors : Levo and Vaso   - Continue on mechanical ventilation with FiO at 35%  - Pt on heparin infusion, no need for citrate   - Epo ordered   - Continue to monitor intake and output, daily weights   - Avoid nephrotoxic medication and renal dose medications to GFR  - Will follow closely    Thank you for your consult. I will follow-up with patient. Please contact us if you have any additional questions.    Layne James DNP, FNP-C  Nephrology  [facility]

## 2019-08-16 NOTE — PROGRESS NOTES
[facility]  Critical Care Medicine  Progress Note    Patient Name: Sisi Medel  MRN: [id number]  Admission Date: 8/3/2019  Hospital Length of Stay: 13 days  Code Status: Full Code  Attending Provider: Edward Le*  Primary Care Provider: Carlos A Caputo MD   Principal Problem: Acute on chronic respiratory failure with hypoxia and hypercapnia    Subjective:     HPI:  Ms Medel  57 yo f with ESRD on HD, cervical radiculopathy, CHF, Chronic resp failure with hypoxia, T2DM, DDD, paroxysmal Afib, seizure, shingles, thyroid disorder here for a 3 day history of cough and SOB. Patient was at dialysis and had to stop at 1.6L due to resp distress. She was put in a nonrebreather at 12L of oxygen. She was transported via EMS. Overnight she required BiPAP for worsening SOB. Rapid response was called this morning because she was hypotensive 70s/50s. Dr. Rosas, MICU team, went and recheck BP was 107/59. She later became hypertensive again and continued to be on BiPAP, she was stepped up for dialysis, BP monitoring, possible vasopressor requirement and further respiratory monitoring.     During step up she was persistently hypoglycemic and hypotensive, peripheral vascular access was lost and a left TLC was placed at bedside. It was determined she could not tolerated her normal HD and she had tenuous respiratory status, so decision was made to intubate. She has been started on broad spectrum abx, and right HD IJ catheter was placed. Nephro been made aware and SCUF planned for today.     Hospital/ICU Course:  Admitted on 08/03 for what appeared to be heart failure exacerbation. Care escalated to ICU due to her tenuous respirashe was intubated and started on broad spectrum abx. She was unable to tolerate her HD so patient has been on CRRT. In evaluating her AMS, CTH was unremarkable and EEG was consistent with encephalopathy. Patient had worsening leukocytosis and BAL on 8/9 was positive for  klebsiella. She is currently on meropenem. Due to chronic steroid use for her sarcoidosis, she is also on bactrim for PJP prophylaxis.  Frequent SAT results in significant tachypnea and agitation. On 8/10 extubation was attempted but failed so was re-intubated for increased work of breathing and tachypnea.   Pressor requirements have decreased however they still need to be raised when pt becomes more alert.    Interval History/Significant Events: NAEO. Pt intermittently needed to be on increased pressor titration. For anxiety, pt continues need presedex and propofol.     Review of Systems   Unable to perform ROS: Intubated     Objective:     Vital Signs (Most Recent):  Temp: 98 °F (36.7 °C) (08/16/19 1100)  Pulse: 69 (08/16/19 1000)  Resp: 18 (08/15/19 1724)  BP: 119/89 (08/16/19 0745)  SpO2: 100 % (08/16/19 1000) Vital Signs (24h Range):  Temp:  [97.6 °F (36.4 °C)-98 °F (36.7 °C)] 98 °F (36.7 °C)  Pulse:  [64-75] 69  Resp:  [18-19] 18  SpO2:  [90 %-100 %] 100 %  BP: ()/(52-94) 119/89  Arterial Line BP: ()/(47-75) 84/48   Weight: 131.5 kg (290 lb)  Body mass index is 44.09 kg/m².      Intake/Output Summary (Last 24 hours) at 8/16/2019 1144  Last data filed at 8/16/2019 1100  Gross per 24 hour   Intake 7923 ml   Output 7154 ml   Net 769 ml       Physical Exam   Constitutional: She appears ill. No distress. She is sedated, intubated and restrained.   HENT:   Head: Normocephalic and atraumatic.   Right Ear: External ear normal.   Left Ear: External ear normal.   Eyes: Pupils are equal, round, and reactive to light. Conjunctivae and EOM are normal. Right eye exhibits no discharge. Left eye exhibits no discharge. No scleral icterus.   Neck: Neck supple.   trialysis catheter in place. Central line in place   Cardiovascular: Normal rate, normal heart sounds and intact distal pulses.   No murmur heard.  Pulses:       Radial pulses are 2+ on the right side, and 2+ on the left side.   Pulmonary/Chest: No stridor.  She is intubated. No respiratory distress. She has wheezes.   Abdominal: Soft. There is no tenderness.   Femoral A-line in place   Musculoskeletal: She exhibits no tenderness.   Neurological: She is alert.   Follows basic commands.   Skin: Skin is warm and dry. She is not diaphoretic. No erythema.   Nursing note and vitals reviewed.      Vents:  Vent Mode: A/C (08/16/19 0902)  Ventilator Initiated: Yes (08/10/19 1742)  Set Rate: 18 bmp (08/16/19 0902)  Vt Set: 400 mL (08/16/19 0902)  Pressure Support: 10 cmH20 (08/12/19 0645)  PEEP/CPAP: 5 cmH20 (08/16/19 0902)  Oxygen Concentration (%): 34 (08/16/19 1000)  Peak Airway Pressure: 34 cmH2O (08/16/19 0902)  Plateau Pressure: 25 cmH20 (08/16/19 0902)  Total Ve: 9.6 mL (08/16/19 0902)  F/VT Ratio<105 (RSBI): (!) 40.09 (08/15/19 1724)  Lines/Drains/Airways     Central Venous Catheter Line                 Percutaneous Central Line Insertion/Assessment - triple lumen  08/04/19 1803 left internal jugular 11 days         Hemodialysis Catheter 08/13/19 0400 right internal jugular 3 days          Drain                 Hemodialysis AV Graft Left upper arm -- days         NG/OG Tube 08/11/19 1600 Center mouth 4 days          Airway                 Airway - Non-Surgical 08/10/19 1756 Endotracheal Tube 5 days          Arterial Line                 Arterial Line 08/13/19 1700 Right Femoral 2 days          Peripheral Intravenous Line                 Peripheral IV - Single Lumen 08/16/19 0614 20 G Posterior;Right Hand less than 1 day              Significant Labs:    CBC/Anemia Profile:  Recent Labs   Lab 08/15/19  0300 08/15/19  0430 08/16/19  0353   WBC 17.75* 18.72* 18.21*   HGB 6.9* 7.3* 7.3*   HCT 24.3* 25.7* 25.7*   * 134* 160   MCV 89 91 90   RDW 24.3* 24.4* 24.8*        Chemistries:  Recent Labs   Lab 08/15/19  0300 08/15/19  1430 08/15/19  2210 08/16/19  0353     141 138 139 137  137   K 4.4  4.4 4.8 4.2 4.8  4.8     108 108 106 107  107   CO2 22*   22* 24 25 21*  21*   BUN 7  7 4* 4* 7  7   CREATININE 0.5  0.5 0.4* 0.4* 0.5  0.5   CALCIUM 7.6*  7.6* 7.5* 7.6* 7.7*  7.7*   ALBUMIN 2.1*  2.1* 2.1* 2.1* 2.1*  2.1*   PROT 5.8*  --   --  5.7*   BILITOT 1.0  --   --  1.0   ALKPHOS 96  --   --  109   ALT 15  --   --  17   AST 25  --   --  24   MG 1.9 2.0 1.9 1.9   PHOS 3.1  3.1 1.5* 3.9 2.6*  2.6*       All pertinent labs within the past 24 hours have been reviewed.    Significant Imaging:  I have reviewed all pertinent imaging results/findings within the past 24 hours.      ABG  Recent Labs   Lab 08/16/19  0356   PH 7.452*   PO2 116*   PCO2 34.0*   HCO3 23.7*   BE 0     Assessment/Plan:     Neuro  Encephalopathy  Mental status stable. Currently on precedex and fentanyl for sedation  - CTH unremarkable, EEG 8/7 showed encephalopathy    Epilepsy  Continue keppra. EEG showed encephalopathy.     Pulmonary  * Acute on chronic respiratory failure with hypoxia and hypercapnia  Hx of sarcoidosis, CHF, ESRD, and recent PNA. Respiratory failure due to a combination of pulmonary edema secondary to CHF and ESRD and recent pneumonia.  - Remains intubated since admission, attempted extubated on 8/10, but re-intubated for increased work of breathing and tachypnea  - Continue meropenem for klebsiella infection  - Continue bactrim for PJP prophylaxis  - Antibiotics per ID recs  - Continue CRRT for net negative volume loss        Pneumonia  Course lung sounds and CXR concerning for PNA in LLL with interstitial fullness with leukocytosis without fevers. Respiratory cultures positive. WBC greatly improved   - ETT culture from 8/4 + MSSA, PSA and moraxella   - continue bactrim for PJP prophylaxis w/ steroid use   - respiratory cx positive for ESBL klebsiella oxytoca - on meropenem   - AFB, PJP negative   - Blood cultures no growth to date   - trend WBC (currently downtrending)   - ID following      Pulmonary hypertension  Likely Group 2 pHTN related to HFrEF. Continue on  dialysis     TTE (8/6/2019): PASP of 50        Cardiac/Vascular  Permanent atrial fibrillation  Patient presents with A fib in the ED. Patient is on chronic warfarin. S/P ablation multiple times.   Plan  -Continue heparin gtt  -Pacemaker in place       Acute on chronic systolic congestive heart failure  Patient has hx of HF (EF 35%). CXR in the ED suggestive of HF. BNP 1424 and troponins 0.156.  Adherence to home medication regimen is unclear.  - Repeat TTE EF 25% on 8/11, LVH, G3DD, LAE/JAGUAR, moderate RV systolic dysfunction   - Continue CRRT  - Pressor requirements decreasing, currently on levo and vasopressin.     Hypotension  On 10 mg midodrine TID at home. Currently on levophed 0.02 and vaso 0.4 based on a-line. On fentanyl drip 100, on precedex.   - Continue midodrine  - levophed/vaso - titrate to map 65  - Pt has been able to maintain BP for CRRT       Renal/  ESRD on hemodialysis  - continue CRRT for goal of net neutral I/O, CXR improving, vasopressor stable  - follow nephrology recommendations      Immunology/Multi System  Pulmonary sarcoidosis  Stress dose steroids while in septic shock. Started on bactrim per ID with chronic steroid use for PCP prophylaxis.    Hematology  Current use of long term anticoagulation  - heparin gtt while IP  - no evidence of hepatic dysfunction on labs - minor bili bump      Endocrine  Type 2 diabetes mellitus, without long-term current use of insulin  - Home medication: linagliptin.   - Glucose elevated over past 24 hr: 273>236>240>256>218  - Continue insulin ggt    GI  Constipation  On senna and colace    Other  Class 2 severe obesity due to excess calories with serious comorbidity and body mass index (BMI) of 38.0 to 38.9 in adult  Will need to revisit weight loss when improving  Significant pannus    Hypoalbuminemia  Currently on tube feeds.  - At 30 with a goal of 40.  - On renal novasource     Current chronic use of systemic steroids  - no evidence of PJP ppx on MAR  -  on stress dose during septic shock   - On bactrim for PJP prophylaxis    Counseling regarding goals of care  Attempted to discuss goals of care with family on 8/10. Patient's adult children would not like to have this discussion at this time.    On 8/15, pt's son spoke with Dr. King regarding GOC. He was unsure about making any decisions at this point. Will continue to revisit.       Critical Care Daily Checklist:    A: Awake: RASS Goal/Actual Goal: RASS Goal: -2-->light sedation  Actual: Horton Agitation Sedation Scale (RASS): Light sedation   B: Spontaneous Breathing Trial Performed? Spon. Breathing Trial Initiated?: Initiated (08/10/19 0800)   C: SAT & SBT Coordinated?  Yes                      D: Delirium: CAM-ICU Overall CAM-ICU: Positive   E: Early Mobility Performed? Yes   F: Feeding Goal: Goals: Patient to meet > 85% EEN and EPN by RD follow-up  Status: Nutrition Goal Status: goal not met   Current Diet Order   Procedures    Diet NPO Except for: Medication     Order Specific Question:   Except for     Answer:   Medication      AS: Analgesia/Sedation Yes   T: Thromboembolic Prophylaxis Heparin   H: HOB > 300 Yes   U: Stress Ulcer Prophylaxis (if needed) Yes   G: Glucose Control SSI   B: Bowel Function Stool Occurrence: 1   I: Indwelling Catheter (Lines & Yepez) Necessity Yes   D: De-escalation of Antimicrobials/Pharmacotherapies Not currently    Plan for the day/ETD SAT/SBT    Code Status:  Family/Goals of Care: Full Code         Critical secondary to Patient has a condition that poses threat to life and bodily function: Severe Respiratory Distress      Critical care was time spent personally by me on the following activities: development of treatment plan with patient or surrogate and bedside caregivers, discussions with consultants, evaluation of patient's response to treatment, examination of patient, ordering and performing treatments and interventions, ordering and review of laboratory studies,  ordering and review of radiographic studies, pulse oximetry, re-evaluation of patient's condition. This critical care time did not overlap with that of any other provider or involve time for any procedures.     CATHRYN Camp MD  OBGYN PGY1   Critical Care Medicine  [facility]

## 2019-08-16 NOTE — SUBJECTIVE & OBJECTIVE
Interval History/Significant Events: NAEO. Pt intermittently needed to be on increased pressor titration. For anxiety, pt continues need presedex and propofol.     Review of Systems   Unable to perform ROS: Intubated     Objective:     Vital Signs (Most Recent):  Temp: 98 °F (36.7 °C) (08/16/19 1100)  Pulse: 69 (08/16/19 1000)  Resp: 18 (08/15/19 1724)  BP: 119/89 (08/16/19 0745)  SpO2: 100 % (08/16/19 1000) Vital Signs (24h Range):  Temp:  [97.6 °F (36.4 °C)-98 °F (36.7 °C)] 98 °F (36.7 °C)  Pulse:  [64-75] 69  Resp:  [18-19] 18  SpO2:  [90 %-100 %] 100 %  BP: ()/(52-94) 119/89  Arterial Line BP: ()/(47-75) 84/48   Weight: 131.5 kg (290 lb)  Body mass index is 44.09 kg/m².      Intake/Output Summary (Last 24 hours) at 8/16/2019 1144  Last data filed at 8/16/2019 1100  Gross per 24 hour   Intake 7923 ml   Output 7154 ml   Net 769 ml       Physical Exam   Constitutional: She appears ill. No distress. She is sedated, intubated and restrained.   HENT:   Head: Normocephalic and atraumatic.   Right Ear: External ear normal.   Left Ear: External ear normal.   Eyes: Pupils are equal, round, and reactive to light. Conjunctivae and EOM are normal. Right eye exhibits no discharge. Left eye exhibits no discharge. No scleral icterus.   Neck: Neck supple.   trialysis catheter in place. Central line in place   Cardiovascular: Normal rate, normal heart sounds and intact distal pulses.   No murmur heard.  Pulses:       Radial pulses are 2+ on the right side, and 2+ on the left side.   Pulmonary/Chest: No stridor. She is intubated. No respiratory distress. She has wheezes.   Abdominal: Soft. There is no tenderness.   Femoral A-line in place   Musculoskeletal: She exhibits no tenderness.   Neurological: She is alert.   Follows basic commands.   Skin: Skin is warm and dry. She is not diaphoretic. No erythema.   Nursing note and vitals reviewed.      Vents:  Vent Mode: A/C (08/16/19 0902)  Ventilator Initiated: Yes  (08/10/19 1742)  Set Rate: 18 bmp (08/16/19 0902)  Vt Set: 400 mL (08/16/19 0902)  Pressure Support: 10 cmH20 (08/12/19 0645)  PEEP/CPAP: 5 cmH20 (08/16/19 0902)  Oxygen Concentration (%): 34 (08/16/19 1000)  Peak Airway Pressure: 34 cmH2O (08/16/19 0902)  Plateau Pressure: 25 cmH20 (08/16/19 0902)  Total Ve: 9.6 mL (08/16/19 0902)  F/VT Ratio<105 (RSBI): (!) 40.09 (08/15/19 1724)  Lines/Drains/Airways     Central Venous Catheter Line                 Percutaneous Central Line Insertion/Assessment - triple lumen  08/04/19 1803 left internal jugular 11 days         Hemodialysis Catheter 08/13/19 0400 right internal jugular 3 days          Drain                 Hemodialysis AV Graft Left upper arm -- days         NG/OG Tube 08/11/19 1600 Center mouth 4 days          Airway                 Airway - Non-Surgical 08/10/19 1756 Endotracheal Tube 5 days          Arterial Line                 Arterial Line 08/13/19 1700 Right Femoral 2 days          Peripheral Intravenous Line                 Peripheral IV - Single Lumen 08/16/19 0614 20 G Posterior;Right Hand less than 1 day              Significant Labs:    CBC/Anemia Profile:  Recent Labs   Lab 08/15/19  0300 08/15/19  0430 08/16/19  0353   WBC 17.75* 18.72* 18.21*   HGB 6.9* 7.3* 7.3*   HCT 24.3* 25.7* 25.7*   * 134* 160   MCV 89 91 90   RDW 24.3* 24.4* 24.8*        Chemistries:  Recent Labs   Lab 08/15/19  0300 08/15/19  1430 08/15/19  2210 08/16/19  0353     141 138 139 137  137   K 4.4  4.4 4.8 4.2 4.8  4.8     108 108 106 107  107   CO2 22*  22* 24 25 21*  21*   BUN 7  7 4* 4* 7  7   CREATININE 0.5  0.5 0.4* 0.4* 0.5  0.5   CALCIUM 7.6*  7.6* 7.5* 7.6* 7.7*  7.7*   ALBUMIN 2.1*  2.1* 2.1* 2.1* 2.1*  2.1*   PROT 5.8*  --   --  5.7*   BILITOT 1.0  --   --  1.0   ALKPHOS 96  --   --  109   ALT 15  --   --  17   AST 25  --   --  24   MG 1.9 2.0 1.9 1.9   PHOS 3.1  3.1 1.5* 3.9 2.6*  2.6*       All pertinent labs within the past 24  hours have been reviewed.    Significant Imaging:  I have reviewed all pertinent imaging results/findings within the past 24 hours.

## 2019-08-16 NOTE — PLAN OF CARE
Problem: Adult Inpatient Plan of Care  Goal: Plan of Care Review  Outcome: Ongoing (interventions implemented as appropriate)  Patient remained intubated 35% 5 PEEP. SVT failed this morning due to tachypnea and anxiety. Fentanyl, Propofol, and Precedex titrated for sedation. HR paced at 70 bpm. Levo and vaso titrated for MAP > 65. Patient hypothermic this afternoon with rectal temp of 93.3F. Critical care team notified and patient placed on bear hugger. Esophageal temp probe placed. CRRT ran throughout shift. UF increased to 400. Heparin gtt maintained per orders. Insulin gtt per protocol. Tube feeds maintained @ 40cc/hr.

## 2019-08-16 NOTE — ASSESSMENT & PLAN NOTE
Mental status stable. Currently on precedex and fentanyl for sedation  - CTH unremarkable, EEG 8/7 showed encephalopathy

## 2019-08-16 NOTE — ASSESSMENT & PLAN NOTE
On 10 mg midodrine TID at home. Currently on levophed 0.02 and vaso 0.4 based on a-line. On fentanyl drip 100, on precedex.   - Continue midodrine  - levophed/vaso - titrate to map 65  - Pt has been able to maintain BP for CRRT

## 2019-08-16 NOTE — PROGRESS NOTES
[facility]  Critical Care Medicine  Progress Note    Patient Name: Sisi Medel  MRN: [id number]  Admission Date: 8/3/2019  Hospital Length of Stay: 12 days  Code Status: Full Code  Attending Provider: Edward Le*  Primary Care Provider: Carlos A Caputo MD   Principal Problem: Acute on chronic respiratory failure with hypoxia and hypercapnia    Subjective:     HPI:  Ms Medel  59 yo f with ESRD on HD, cervical radiculopathy, CHF, Chronic resp failure with hypoxia, T2DM, DDD, paroxysmal Afib, seizure, shingles, thyroid disorder here for a 3 day history of cough and SOB. Patient was at dialysis and had to stop at 1.6L due to resp distress. She was put in a nonrebreather at 12L of oxygen. She was transported via EMS. Overnight she required BiPAP for worsening SOB. Rapid response was called this morning because she was hypotensive 70s/50s. Dr. Rosas, MICU team, went and recheck BP was 107/59. She later became hypertensive again and continued to be on BiPAP, she was stepped up for dialysis, BP monitoring, possible vasopressor requirement and further respiratory monitoring.     During step up she was persistently hypoglycemic and hypotensive, peripheral vascular access was lost and a left TLC was placed at bedside. It was determined she could not tolerated her normal HD and she had tenuous respiratory status, so decision was made to intubate. She has been started on broad spectrum abx, and right HD IJ catheter was placed. Nephro been made aware and SCUF planned for today.     Hospital/ICU Course:  Admitted on 08/03 for what appeared to be heart failure exacerbation. Care escalated to ICU due to her tenuous respirashe was intubated and started on broad spectrum abx. She was unable to tolerate her HD so patient has been on CRRT. In evaluating her AMS, CTH was unremarkable and EEG was consistent with encephalopathy. Patient had worsening leukocytosis and BAL on 8/9 was positive for  klebsiella. She is currently on meropenem. Due to chronic steroid use for her sarcoidosis, she is also on bactrim for PJP prophylaxis.  Frequent SAT results in significant tachypnea and agitation. On 8/10 extubation was attempted but failed so was re-intubated for increased work of breathing and tachypnea.   Pressor requirements have decreased however she isn't able to be weaned off of pressors completely.      Interval History/Significant Events:   Fungitell came back elevated however CT chest had no evidence of fungal infection. Per ID no antifungal medications are needed at this time.   She has been increasingly anxious and agitated requiring an increase in sedation. Pressor requirements are low however any attempt at stopping the levophed or vaso results in significant hypotension.     Review of Systems   Unable to perform ROS: Intubated     Objective:     Vital Signs (Most Recent):  Temp: 97.6 °F (36.4 °C) (08/15/19 1500)  Pulse: 70 (08/15/19 1530)  Resp: 18 (08/15/19 1307)  BP: (!) 78/52 (08/15/19 1500)  SpO2: 99 % (08/15/19 1530) Vital Signs (24h Range):  Temp:  [97.4 °F (36.3 °C)-97.8 °F (36.6 °C)] 97.6 °F (36.4 °C)  Pulse:  [63-74] 70  Resp:  [18-19] 18  SpO2:  [91 %-100 %] 99 %  BP: ()/(44-84) 78/52  Arterial Line BP: ()/(47-73) 110/59   Weight: 107.9 kg (237 lb 14 oz)  Body mass index is 36.17 kg/m².      Intake/Output Summary (Last 24 hours) at 8/15/2019 1558  Last data filed at 8/15/2019 1500  Gross per 24 hour   Intake 5663.57 ml   Output 6812 ml   Net -1148.43 ml       Physical Exam   Constitutional: She appears ill. No distress. She is sedated, intubated and restrained.   HENT:   Head: Normocephalic and atraumatic.   Right Ear: External ear normal.   Left Ear: External ear normal.   Eyes: Pupils are equal, round, and reactive to light. Conjunctivae and EOM are normal. Right eye exhibits no discharge. Left eye exhibits no discharge.   Neck: Normal range of motion. Neck supple.   trialysis  catheter in place. Central line in place   Cardiovascular: Normal rate, normal heart sounds and intact distal pulses.   No murmur heard.  Pulses:       Radial pulses are 2+ on the right side, and 2+ on the left side.   Pulmonary/Chest: Effort normal. No stridor. She is intubated. No respiratory distress. She has no wheezes. She has rales.   Abdominal: Soft. There is no tenderness.   Femoral A-line in place   Musculoskeletal: She exhibits no tenderness.   Neurological: She is alert.   Follows basic commands.   Skin: Skin is warm and dry. She is not diaphoretic. No erythema.   Nursing note and vitals reviewed.      Vents:  Vent Mode: A/C (08/15/19 1307)  Ventilator Initiated: Yes (08/10/19 1742)  Set Rate: 18 bmp (08/15/19 1307)  Vt Set: 400 mL (08/15/19 1307)  Pressure Support: 10 cmH20 (08/12/19 0645)  PEEP/CPAP: 5 cmH20 (08/15/19 1307)  Oxygen Concentration (%): 34 (08/15/19 1530)  Peak Airway Pressure: 28 cmH2O (08/15/19 1307)  Plateau Pressure: 30 cmH20 (08/15/19 1307)  Total Ve: 8.04 mL (08/15/19 1307)  F/VT Ratio<105 (RSBI): (!) 40.36 (08/15/19 1307)  Lines/Drains/Airways     Central Venous Catheter Line                 Percutaneous Central Line Insertion/Assessment - triple lumen  08/04/19 1803 left internal jugular 10 days         Hemodialysis Catheter 08/13/19 0400 right internal jugular 2 days          Drain                 Hemodialysis AV Graft Left upper arm -- days         NG/OG Tube 08/11/19 1600 Center mouth 3 days          Airway                 Airway - Non-Surgical 08/10/19 1756 Endotracheal Tube 4 days          Arterial Line                 Arterial Line 08/13/19 1700 Right Femoral 1 day              Significant Labs:    CBC/Anemia Profile:  Recent Labs   Lab 08/14/19  0300 08/15/19  0300 08/15/19  0430   WBC 25.88* 17.75* 18.72*   HGB 7.1* 6.9* 7.3*   HCT 25.0* 24.3* 25.7*   * 119* 134*   MCV 90 89 91   RDW 23.9* 24.3* 24.4*        Chemistries:  Recent Labs   Lab 08/14/19  9595   08/14/19  2200 08/15/19  0300 08/15/19  1430     142   < > 140 141  141 138   K 4.5  4.5   < > 4.6 4.4  4.4 4.8     108   < > 106 108  108 108   CO2 27  27   < > 21* 22*  22* 24   BUN 4*  4*   < > 9 7  7 4*   CREATININE 0.5  0.5   < > 0.6 0.5  0.5 0.4*   CALCIUM 7.8*  7.8*   < > 8.1* 7.6*  7.6* 7.5*   ALBUMIN 2.1*  2.1*   < > 2.1* 2.1*  2.1* 2.1*   PROT 6.1  --   --  5.8*  --    BILITOT 0.8  --   --  1.0  --    ALKPHOS 108  --   --  96  --    ALT 15  --   --  15  --    AST 25  --   --  25  --    MG 1.8   < > 2.4 1.9 2.0   PHOS 2.0*  2.0*   < > 4.9* 3.1  3.1 1.5*    < > = values in this interval not displayed.       All pertinent labs within the past 24 hours have been reviewed.    Significant Imaging:  I have reviewed and interpreted all pertinent imaging results/findings within the past 24 hours.      ABG  Recent Labs   Lab 08/15/19  0308   PH 7.436   PO2 126*   PCO2 35.0   HCO3 23.6*   BE -1     Assessment/Plan:     Neuro  Encephalopathy  Mental status stable. Currently on precedex and fentanyl for sedation  - CTH unremarkable, EEG 8/7 showed encephalopathy    Epilepsy  Continue keppra. EEG showed encephalopathy.     Pulmonary  * Acute on chronic respiratory failure with hypoxia and hypercapnia  Hx of sarcoidosis, CHF, ESRD, and recent PNA. Respiratory failure due to a combination of pulmonary edema secondary to CHF and ESRD and recent pneumonia.  - Remains intubated since admission, attempted extubated on 8/10, but re-intubated for increased work of breathing and tachypnea  - Continue meropenem for klebsiella infection  - Continue bactrim for PJP prophylaxis  - Antibiotics per ID recs  - Continue CRRT for net negative volume loss        Pneumonia  Course lung sounds and CXR concerning for PNA in LLL with interstitial fullness with leukocytosis without fevers. Respiratory cultures positive.    - ETT culture from 8/4 + MSSA, PSA and moraxella   - continue bactrim for PJP prophylaxis w/  steroid use   - respiratory cx positive for ESBL klebsiella oxytoca - on meropenem   - AFB, PJP negative   - Blood cultures no growth to date   - trend WBC (currently downtrending)   - ID following      Pulmonary hypertension  Likely Group 2 pHTN related to HFrEF. Continue on dialysis     TTE (8/6/2019): PASP of 50        Cardiac/Vascular  Permanent atrial fibrillation  Patient presents with A fib in the ED. Patient is on chronic warfarin. S/P ablation multiple times.   Plan  -Continue heparin gtt  -Pacemaker in place       Acute on chronic systolic congestive heart failure  Patient has hx of HF (EF 35%). CXR in the ED suggestive of HF. BNP 1424 and troponins 0.156.  Adherence to home medication regimen is unclear.  - Repeat TTE EF 25% on 8/11, LVH, G3DD, LAE/JAGUAR, moderate RV systolic dysfunction   - Continue CRRT with plan for net neutral I/O today  - Pressor requirements decreasing, currently on levo and vasopressin.     Hypotension  On 10 mg midodrine TID at home. Currently on levophed 0.1 and vaso 0.4 based on a-line. On fentanyl drip, will restart precedex.   - Continue midodrine  - levophed/vaso - titrate to map 65  - Will monitor closely as patient restarts dialysis today.       Renal/  ESRD on hemodialysis  - continue CRRT for goal of net neutral I/O, CXR improving, vasopressor stable  - follow nephrology recommendations      Immunology/Multi System  Pulmonary sarcoidosis  Stress dose steroids while in septic shock. Started on bactrim per ID with chronic steroid use    Hematology  Current use of long term anticoagulation  - heparin gtt while IP  - no evidence of hepatic dysfunction on labs - minor bili bump      Endocrine  Type 2 diabetes mellitus, without long-term current use of insulin  - Home medication: linagliptin.   - Glucose readings 167,141,145     GI  Constipation  On senna and colace    Other  Class 2 severe obesity due to excess calories with serious comorbidity and body mass index (BMI) of  38.0 to 38.9 in adult  Will need to revisit weight loss when improving  Significant pannus    Hypoalbuminemia  Currently on tube feeds.  - At 30 with a goal of 40.  - On renal novasource     Current chronic use of systemic steroids  - no evidence of PJP ppx on MAR  - on stress dose during septic shock   - On bactrim for PJP prophylaxis    Counseling regarding goals of care  Attempted to discuss goals of care with family on 8/10. Patient's adult children would not like to have this discussion at this time. Patient appears comfortable on vent. Will revisit goals of care discussion with family.       Critical Care Daily Checklist:    A: Awake: RASS Goal/Actual Goal: RASS Goal: -2-->light sedation  Actual: Horton Agitation Sedation Scale (RASS): Light sedation   B: Spontaneous Breathing Trial Performed? Spon. Breathing Trial Initiated?: Initiated (08/10/19 0800)   C: SAT & SBT Coordinated?  Failed                    D: Delirium: CAM-ICU Overall CAM-ICU: Positive   E: Early Mobility Performed? No   F: Feeding Goal: Goals: Patient to meet > 85% EEN and EPN by RD follow-up  Status: Nutrition Goal Status: progressing towards goal   Current Diet Order   Procedures    Diet NPO Except for: Medication     Order Specific Question:   Except for     Answer:   Medication      AS: Analgesia/Sedation Fentanyl, precedex   T: Thromboembolic Prophylaxis Heparin   H: HOB > 300 Yes   U: Stress Ulcer Prophylaxis (if needed) Famotidine   G: Glucose Control Insulin gtt   B: Bowel Function     I: Indwelling Catheter (Lines & Yepez) Necessity Trialysis (right IJ), TLC (left IJ), OG, A-line (femoral)   D: De-escalation of Antimicrobials/Pharmacotherapies As clinically indicated    Plan for the day/ETD Maintain low pressor requirements.    Code Status:  Family/Goals of Care: Full Code         Critical secondary to Patient has a condition that poses threat to life and bodily function: Severe Respiratory Distress      Critical care was time  spent personally by me on the following activities: development of treatment plan with patient or surrogate and bedside caregivers, discussions with consultants, evaluation of patient's response to treatment, examination of patient, ordering and performing treatments and interventions, ordering and review of laboratory studies, ordering and review of radiographic studies, pulse oximetry, re-evaluation of patient's condition. This critical care time did not overlap with that of any other provider or involve time for any procedures.    Plan to be discussed with critical care team.     Elisabeth Munoz MD  Critical Care Medicine  [facility]

## 2019-08-16 NOTE — ASSESSMENT & PLAN NOTE
Attempted to discuss goals of care with family on 8/10. Patient's adult children would not like to have this discussion at this time.    On 8/15, pt's son spoke with Dr. King regarding GOC. He was unsure about making any decisions at this point. Will continue to revisit.

## 2019-08-16 NOTE — ASSESSMENT & PLAN NOTE
- Home medication: linagliptin.   - Glucose elevated over past 24 hr: 273>236>240>256>218  - Continue insulin ggt

## 2019-08-16 NOTE — PROGRESS NOTES
"[facility]  Adult Nutrition  Progress Note    SUMMARY       Recommendations  Recommendation/Intervention:   1. Current TF does not meet needs, recommend modifying to Peptamen Intense VHP at a goal rate of 55 mL/hr - meets needs.   2. When able to extubate, ADAT to Renal with texture per SLP.   RD to monitor.    Goals: Patient to meet > 85% EEN and EPN by RD follow-up  Nutrition Goal Status: goal not met  Communication of RD Recs: (POC)    Reason for Assessment  Reason For Assessment: RD follow-up  Diagnosis: (respiratory failure)  Relevant Medical History: CHF, DM2, ESRD on HD, seizures  General Information Comments: Still intubated, sedated. TF formula changed to renal, currently at 40 mL/hr. Physical exam remains unchanged, continues to appear nourished.  Nutrition Discharge Planning: Unable to determine at this time.    Nutrition Risk Screen  Nutrition Risk Screen: tube feeding or parenteral nutrition    Nutrition/Diet History  Spiritual, Cultural Beliefs, [employer] Practices, Values that Affect Care: no  Factors Affecting Nutritional Intake: NPO, on mechanical ventilation    Anthropometrics  Temp: 97.9 °F (36.6 °C)  Height Method: Stated  Height: 5' 8" (172.7 cm)  Height (inches): 68 in  Weight Method: Bed Scale  Weight: 131.5 kg (290 lb)  Weight (lb): 290 lb  Ideal Body Weight (IBW), Female: 140 lb  % Ideal Body Weight, Female (lb): 172.86 lb  BMI (Calculated): 36.9  BMI Grade: 35 - 39.9 - obesity - grade II    Lab/Procedures/Meds  Pertinent Labs Reviewed: reviewed  Pertinent Labs Comments: Glu 253, POCT Glu 236-273, HgbA1c 6.1, Ca 7.7, Phos 2.6, Alb 2.1  Pertinent Medications Reviewed: reviewed  Pertinent Medications Comments: famotidine, senna-docusate, precedex, fentnayl, insulin drip, propofol, vasopressin    Estimated/Assessed Needs  Weight Used For Calorie Calculations: 110 kg (242 lb 8.1 oz)  Energy Calorie Requirements (kcal): 1540 kcal/day  Energy Need Method: Kcal/kg(14)  Protein " Requirements: 118 g/day(2.0 g/kg)  Weight Used For Protein Calculations: 59.1 kg (130 lb 4.7 oz)(IBW)  Fluid Requirements (mL): 1 mL/kcal or per MD  Estimated Fluid Requirement Method: RDA Method  RDA Method (mL): 1540  CHO Requirement: 50% total kcal    Nutrition Prescription Ordered  Current Diet Order: NPO  Current Nutrition Support Formula Ordered: Novasource Renal  Current Nutrition Support Rate Ordered: 40 (ml)  Current Nutrition Support Frequency Ordered: mL/hr    Evaluation of Received Nutrient/Fluid Intake  Enteral Calories (kcal): 1920  Enteral Protein (gm): 87  Enteral (Free Water) Fluid (mL): 688  % Kcal Needs: 125%  % Protein Needs: 74%  I/O: -4.6L since admit  Energy Calories Required: exceeds needs  Protein Required: not meeting needs  Fluid Required: (per MD)  Comments: LBM 8/16  Tolerance: tolerating  % Intake of Estimated Energy Needs: Other: > 100%  % Meal Intake: NPO    Nutrition Risk  Level of Risk/Frequency of Follow-up: high(2x/week)     Assessment and Plan  Nutrition Problem  Inadequate energy intake     Related to (etiology):   Decreased ability to consume sufficient energy     Signs and Symptoms (as evidenced by):   NPO with and TF providing < 85% EEN and EPN     Interventions/Recommendations (treatment strategy):  Collaboration of nutrition care with other providers     Nutrition Diagnosis Status:   Improving - advancing to goal rate    Monitor and Evaluation  Food and Nutrient Intake: energy intake, enteral nutrition intake  Food and Nutrient Adminstration: enteral and parenteral nutrition administration  Anthropometric Measurements: weight, weight change  Biochemical Data, Medical Tests and Procedures: electrolyte and renal panel, gastrointestinal profile, glucose/endocrine profile, inflammatory profile  Nutrition-Focused Physical Findings: overall appearance     Nutrition Follow-Up  RD Follow-up?: Yes

## 2019-08-16 NOTE — SUBJECTIVE & OBJECTIVE
Interval History: Seen on SLED this AM. No issues noted. Patient net positive ~ 900 ml/24 hrs. UFR at 350 ml/hr. Patient taking in about ~130 ml/hr given drips. Remains intubated, FiO2 35% but responsive to direct commands. BP stable on Levophed 0.04 mcg and Vasopressin 0.04 mcg. Electrolytes and acid base stable.     Review of patient's allergies indicates:   Allergen Reactions    Bactrim [sulfamethoxazole-trimethoprim] Other (See Comments)     Causes renal failure    Nsaids (non-steroidal anti-inflammatory drug) Other (See Comments)     Renal failure     Current Facility-Administered Medications   Medication Frequency    0.9%  NaCl infusion (CRRT USE ONLY) Continuous    acetaminophen tablet 650 mg Q4H PRN    albuterol-ipratropium 2.5 mg-0.5 mg/3 mL nebulizer solution 3 mL Q6H PRN    chlorhexidine 0.12 % solution 15 mL BID    cinacalcet tablet 30 mg Daily with breakfast    clopidogrel tablet 75 mg Daily    dexmedetomidine (PRECEDEX) 400mcg/100mL 0.9% NaCL infusion Continuous    dextrose 10% (D10W) Bolus PRN    dextrose 10% (D10W) Bolus PRN    dextrose 50% injection 25 g PRN    epoetin lily-epbx injection 5,500 Units Every Mon, Wed, Fri    famotidine tablet 20 mg Daily    fentaNYL 2500 mcg in 0.9% sodium chloride 250 mL infusion premix (titrating) Continuous    fentaNYL injection 25 mcg Q1H PRN    fludrocortisone tablet 100 mcg Daily    glucagon (human recombinant) injection 1 mg PRN    glucose chewable tablet 16 g PRN    glucose chewable tablet 24 g PRN    heparin 25,000 units in dextrose 5% (100 units/ml) IV bolus from bag - ADDITIONAL PRN BOLUS - 30 units/kg PRN    heparin 25,000 units in dextrose 5% (100 units/ml) IV bolus from bag - ADDITIONAL PRN BOLUS - 60 units/kg PRN    heparin 25,000 units in dextrose 5% 250 mL (100 units/mL) infusion LOW INTENSITY nomogram - [facility] Continuous    hydrocortisone sodium succinate injection 100 mg Q8H    insulin regular (Humulin R) 100 Units in sodium  chloride 0.9% 100 mL infusion Continuous    levETIRAcetam in NaCl (iso-os) IVPB 500 mg Q12H    LORazepam tablet 0.5 mg Q8H PRN    magnesium sulfate 2g in water 50mL IVPB (premix) PRN    meropenem (MERREM) 2 g in sodium chloride 0.9% 100 mL IVPB Q8H    miconazole NITRATE 2 % top powder BID    midodrine tablet 10 mg TID    norepinephrine 4 mg in dextrose 5% 250 mL infusion (premix) (titrating) Continuous    ondansetron disintegrating tablet 8 mg Q8H PRN    polyethylene glycol packet 17 g BID    prednisoLONE acetate 1 % ophthalmic suspension 1 drop TID    propofol (DIPRIVAN) 10 mg/mL infusion Continuous    senna-docusate 8.6-50 mg per tablet 1 tablet BID    sodium chloride 0.9% flush 10 mL PRN    sodium phosphate 20.01 mmol in dextrose 5 % 250 mL IVPB PRN    sodium phosphate 30 mmol in dextrose 5 % 250 mL IVPB PRN    sodium phosphate 39.99 mmol in dextrose 5 % 250 mL IVPB PRN    sulfamethoxazole-trimethoprim 800-160mg per tablet 1 tablet BID    tiZANidine tablet 4 mg Nightly PRN    valproate (DEPACON) 250 mg in dextrose 5 % 50 mL IVPB Q8H    vasopressin (PITRESSIN) 0.2 Units/mL in dextrose 5 % 100 mL infusion Continuous     Facility-Administered Medications Ordered in Other Encounters   Medication Frequency    ceFAZolin injection 2 g On Call Procedure    lidocaine (PF) 10 mg/ml (1%) injection 10 mg Once    mupirocin 2 % ointment On Call Procedure    sodium chloride 0.9% flush 10 mL PRN       Objective:     Vital Signs (Most Recent):  Temp: 97.9 °F (36.6 °C) (08/16/19 0300)  Pulse: 70 (08/16/19 0902)  Resp: 18 (08/15/19 1724)  BP: (!) 116/94 (08/15/19 2020)  SpO2: 99 % (08/16/19 0902)  O2 Device (Oxygen Therapy): ventilator (08/16/19 0711) Vital Signs (24h Range):  Temp:  [97.6 °F (36.4 °C)-97.9 °F (36.6 °C)] 97.9 °F (36.6 °C)  Pulse:  [64-75] 70  Resp:  [18-19] 18  SpO2:  [90 %-100 %] 99 %  BP: ()/(51-94) 116/94  Arterial Line BP: ()/(47-75) 103/62     Weight: 131.5 kg (290 lb)  (08/16/19 0400)  Body mass index is 44.09 kg/m².  Body surface area is 2.51 meters squared.    I/O last 3 completed shifts:  In: 89923.6 [I.V.:45749.6; NG/GT:1390]  Out: 9885 [Drains:40; Other:9845]    Physical Exam   Constitutional: She appears well-developed and well-nourished. No distress. She is sedated and intubated.   HENT:   Right Ear: External ear normal.   Left Ear: External ear normal.   Nose: Nose normal.   Mouth/Throat: Oropharynx is clear and moist.   Neck: Neck supple.   Cardiovascular: Normal rate, regular rhythm, normal heart sounds and intact distal pulses.   No murmur heard.  Pulmonary/Chest: Effort normal. She is intubated. No respiratory distress. She has no wheezes. She has rales.   Abdominal: Soft. Bowel sounds are normal. She exhibits no distension. There is no tenderness.   Musculoskeletal: She exhibits edema.   Skin: Skin is warm and dry. No rash noted. She is not diaphoretic. No erythema.   Vitals reviewed.    Significant Labs:  CBC:   Recent Labs   Lab 08/16/19  0353   WBC 18.21*   RBC 2.85*   HGB 7.3*   HCT 25.7*      MCV 90   MCH 25.6*   MCHC 28.4*     CMP:   Recent Labs   Lab 08/16/19  0353   *  253*   CALCIUM 7.7*  7.7*   ALBUMIN 2.1*  2.1*   PROT 5.7*     137   K 4.8  4.8   CO2 21*  21*     107   BUN 7  7   CREATININE 0.5  0.5   ALKPHOS 109   ALT 17   AST 24   BILITOT 1.0

## 2019-08-17 NOTE — ASSESSMENT & PLAN NOTE
Patient has hx of HF (EF 35%). CXR in the ED suggestive of HF. BNP 1424 and troponins 0.156.  Adherence to home medication regimen is unclear.  - Repeat TTE EF 25% on 8/11, LVH, G3DD, LAE/JAGUAR, moderate RV systolic dysfunction   - Continue CRRT  - Pressor requirements stable, currently on levo and vasopressin.

## 2019-08-17 NOTE — ASSESSMENT & PLAN NOTE
- Respiratory failure due to a combination of pulmonary edema secondary to CHF and ESRD and recent pneumonia.  - Remains intubated since admission, attempted extubated on 8/10, but re-intubated for increased work of breathing and tachypnea  - Continue meropenem for klebsiella infection  - Continue bactrim for PJP prophylaxis  - Antibiotics per ID recs  - Continue CRRT for net negative volume loss

## 2019-08-17 NOTE — ASSESSMENT & PLAN NOTE
- Home medication: linagliptin.   - Glucose elevated over past 24 hr, but better managed: 141>131>133>199>222  - Continue insulin ggt

## 2019-08-17 NOTE — ASSESSMENT & PLAN NOTE
- Continue stress dose steroids while in septic shock.   - Started on bactrim per ID with chronic steroid use for PCP prophylaxis

## 2019-08-17 NOTE — SUBJECTIVE & OBJECTIVE
Interval History/Significant Events: Pt remained on sedatives intermittently. Pressors continue to be needed during CRRT. Pt had an episode of hypothermia (93.3 Tlow) for several hours; a lacey hugger was applied and resolved by 8/17. WBC increased. Will continue to monitor for other signs of decompensation.    Review of Systems   Unable to perform ROS: Intubated   Psychiatric/Behavioral: Positive for agitation (When sedation is weaned).     Objective:     Vital Signs (Most Recent):  Temp: 97.7 °F (36.5 °C) (08/17/19 1000)  Pulse: 70 (08/17/19 1106)  Resp: (!) 28 (08/17/19 1106)  BP: (!) 78/56 (08/17/19 1106)  SpO2: 99 % (08/17/19 1106) Vital Signs (24h Range):  Temp:  [93.3 °F (34.1 °C)-99.9 °F (37.7 °C)] 97.7 °F (36.5 °C)  Pulse:  [66-73] 70  Resp:  [18-28] 28  SpO2:  [98 %-100 %] 99 %  BP: ()/(56-74) 78/56  Arterial Line BP: ()/(50-85) 96/59   Weight: 130.2 kg (287 lb)  Body mass index is 43.64 kg/m².      Intake/Output Summary (Last 24 hours) at 8/17/2019 1241  Last data filed at 8/17/2019 1100  Gross per 24 hour   Intake 8808 ml   Output 7945 ml   Net 863 ml       Physical Exam   Constitutional: She appears ill. No distress. She is sedated, intubated and restrained.   HENT:   Head: Normocephalic and atraumatic.   Right Ear: External ear normal.   Left Ear: External ear normal.   Eyes: Pupils are equal, round, and reactive to light. Conjunctivae and EOM are normal. Right eye exhibits no discharge. Left eye exhibits no discharge. No scleral icterus.   Neck: Neck supple.   trialysis catheter in place. Central line in place   Cardiovascular: Normal rate, normal heart sounds and intact distal pulses.   No murmur heard.  Pulses:       Radial pulses are 2+ on the right side, and 2+ on the left side.   Pulmonary/Chest: No stridor. She is intubated. No respiratory distress. She has wheezes.   Abdominal: Soft. There is no tenderness.   Femoral A-line in place   Musculoskeletal: She exhibits no tenderness.    Neurological: She is alert.   Follows basic commands.   Skin: Skin is warm and dry. She is not diaphoretic. No erythema.   Nursing note and vitals reviewed.      Vents:  Vent Mode: A/C (08/17/19 1106)  Ventilator Initiated: Yes (08/10/19 1742)  Set Rate: 18 bmp (08/17/19 1106)  Vt Set: 400 mL (08/17/19 1106)  Pressure Support: 10 cmH20 (08/12/19 0645)  PEEP/CPAP: 5 cmH20 (08/17/19 1106)  Oxygen Concentration (%): 35 (08/17/19 1106)  Peak Airway Pressure: 34 cmH2O (08/17/19 1106)  Plateau Pressure: 25 cmH20 (08/17/19 1106)  Total Ve: 13.6 mL (08/17/19 1106)  F/VT Ratio<105 (RSBI): (!) 85.11 (08/17/19 1106)  Lines/Drains/Airways     Central Venous Catheter Line                 Percutaneous Central Line Insertion/Assessment - triple lumen  08/04/19 1803 left internal jugular 12 days         Hemodialysis Catheter 08/13/19 0400 right internal jugular 4 days          Drain                 Hemodialysis AV Graft Left upper arm -- days         NG/OG Tube 08/11/19 1600 Center mouth 5 days          Airway                 Airway - Non-Surgical 08/10/19 1756 Endotracheal Tube 6 days          Arterial Line                 Arterial Line 08/13/19 1700 Right Femoral 3 days          Peripheral Intravenous Line                 Peripheral IV - Single Lumen 08/16/19 0614 20 G Posterior;Right Hand 1 day              Significant Labs:    CBC/Anemia Profile:  Recent Labs   Lab 08/16/19  0353 08/17/19  0407   WBC 18.21* 23.42*   HGB 7.3* 7.3*   HCT 25.7* 26.2*    153   MCV 90 93   RDW 24.8* 25.1*        Chemistries:  Recent Labs   Lab 08/16/19  0353 08/16/19  1355 08/16/19  2207 08/17/19  0407     137 137 137 136  136   K 4.8  4.8 3.8 3.8 4.7  4.7     107 106 108 108  108   CO2 21*  21* 23 16* 17*  17*   BUN 7  7 10 14 21*  21*   CREATININE 0.5  0.5 0.7 0.8 1.1  1.1   CALCIUM 7.7*  7.7* 8.0* 7.9* 7.9*  7.9*   ALBUMIN 2.1*  2.1* 2.0* 2.2* 2.1*  2.1*   PROT 5.7*  --   --  5.7*   BILITOT 1.0  --   --  0.6    ALKPHOS 109  --   --  110   ALT 17  --   --  17   AST 24  --   --  23   MG 1.9 2.2 1.9 1.9   PHOS 2.6*  2.6* 2.7 5.2* 5.1*  5.1*       All pertinent labs within the past 24 hours have been reviewed.    Significant Imaging:  I have reviewed all pertinent imaging results/findings within the past 24 hours.

## 2019-08-17 NOTE — ASSESSMENT & PLAN NOTE
Course lung sounds and CXR concerning for PNA in LLL with interstitial fullness with leukocytosis without fevers. Respiratory cultures positive. WBC slight uptrend w/ episode of hypothermia yesterday.   - ETT culture from 8/4 + MSSA, PSA and moraxella   - continue bactrim for PJP prophylaxis w/ steroid use   - respiratory cx positive for ESBL klebsiella oxytoca - on meropenem   - AFB, PJP negative   - Blood cultures no growth to date   - continue to trend trend WBC (slightly uptrended)   - ID signed off

## 2019-08-17 NOTE — CARE UPDATE
Pt placed on SBT trial @ 10/5. Pt failed trial RR 45 RSBI 200's. Pt trialed on PAV+ 70% pt much more comfortable and tolerating well. RR 25 . RN at bedside. Will continue to trial pt and place back on previous vent settings after trial completes or if pt becomes more agitated again. No complications, will continue to monitor.

## 2019-08-17 NOTE — ASSESSMENT & PLAN NOTE
- continue CRRT for goal of net neutral I/O, vasopressor requirement stable  - follow nephrology recommendation

## 2019-08-17 NOTE — PROGRESS NOTES
Patient on A/C ventilation, 35% and 5 of PEEP, SATs % through shift, MAPs maintained above 65 through shift, afebrile, paced at 70. Patient on levo, vaso, heparin, insulin, propofol, precedex, fentanyl; see flow sheet for drip rates. Patient on CRRT, UF goal 400, restarted at 0500. Labs sent, lytes replaced per MD orders.Tube feeds at 40 ml/hr, minimal residuals. Patient able to follow commands and wakes easily on current sedation, all questions answered by RN, will continue to monitor.

## 2019-08-17 NOTE — SUBJECTIVE & OBJECTIVE
Interval History: Patient seen and examined at bedside, no acute events overnight. On SCUF 9 liters removed on UF. Currently on negative balance.     Review of patient's allergies indicates:   Allergen Reactions    Bactrim [sulfamethoxazole-trimethoprim] Other (See Comments)     Causes renal failure    Nsaids (non-steroidal anti-inflammatory drug) Other (See Comments)     Renal failure     Current Facility-Administered Medications   Medication Frequency    0.9%  NaCl infusion (CRRT USE ONLY) Continuous    acetaminophen tablet 650 mg Q4H PRN    albuterol-ipratropium 2.5 mg-0.5 mg/3 mL nebulizer solution 3 mL Q6H PRN    chlorhexidine 0.12 % solution 15 mL BID    cinacalcet tablet 30 mg Daily with breakfast    clopidogrel tablet 75 mg Daily    dexmedetomidine (PRECEDEX) 400mcg/100mL 0.9% NaCL infusion Continuous    dextrose 10% (D10W) Bolus PRN    dextrose 10% (D10W) Bolus PRN    dextrose 50% injection 25 g PRN    epoetin lily-epbx injection 5,500 Units Every Mon, Wed, Fri    famotidine tablet 20 mg Daily    fentaNYL 2500 mcg in 0.9% sodium chloride 250 mL infusion premix (titrating) Continuous    fentaNYL injection 25 mcg Q1H PRN    fludrocortisone tablet 100 mcg Daily    glucagon (human recombinant) injection 1 mg PRN    glucose chewable tablet 16 g PRN    glucose chewable tablet 24 g PRN    heparin 25,000 units in dextrose 5% (100 units/ml) IV bolus from bag - ADDITIONAL PRN BOLUS - 30 units/kg PRN    heparin 25,000 units in dextrose 5% (100 units/ml) IV bolus from bag - ADDITIONAL PRN BOLUS - 60 units/kg PRN    heparin 25,000 units in dextrose 5% 250 mL (100 units/mL) infusion LOW INTENSITY nomogram - [facility] Continuous    hydrocortisone sodium succinate injection 100 mg Q8H    insulin regular (Humulin R) 100 Units in sodium chloride 0.9% 100 mL infusion Continuous    levETIRAcetam in NaCl (iso-os) IVPB 500 mg Q12H    LORazepam tablet 0.5 mg Q8H PRN    meropenem-0.9% sodium chloride 1 g/50  mL IVPB Q12H    miconazole NITRATE 2 % top powder BID    midodrine tablet 10 mg TID    norepinephrine 4 mg in dextrose 5% 250 mL infusion (premix) (titrating) Continuous    ondansetron disintegrating tablet 8 mg Q8H PRN    polyethylene glycol packet 17 g BID    prednisoLONE acetate 1 % ophthalmic suspension 1 drop TID    propofol (DIPRIVAN) 10 mg/mL infusion Continuous    senna-docusate 8.6-50 mg per tablet 1 tablet BID    sodium chloride 0.9% flush 10 mL PRN    sulfamethoxazole-trimethoprim 800-160mg per tablet 1 tablet BID    tiZANidine tablet 4 mg Nightly PRN    valproate (DEPACON) 250 mg in dextrose 5 % 50 mL IVPB Q8H    vasopressin (PITRESSIN) 0.2 Units/mL in dextrose 5 % 100 mL infusion Continuous     Facility-Administered Medications Ordered in Other Encounters   Medication Frequency    ceFAZolin injection 2 g On Call Procedure    lidocaine (PF) 10 mg/ml (1%) injection 10 mg Once    mupirocin 2 % ointment On Call Procedure    sodium chloride 0.9% flush 10 mL PRN       Objective:     Vital Signs (Most Recent):  Temp: 97.7 °F (36.5 °C) (08/17/19 1000)  Pulse: 70 (08/17/19 1301)  Resp: (!) 28 (08/17/19 1106)  BP: (!) 78/56 (08/17/19 1106)  SpO2: 100 % (08/17/19 1301)  O2 Device (Oxygen Therapy): ventilator (08/17/19 1106) Vital Signs (24h Range):  Temp:  [93.3 °F (34.1 °C)-99.9 °F (37.7 °C)] 97.7 °F (36.5 °C)  Pulse:  [66-73] 70  Resp:  [18-28] 28  SpO2:  [98 %-100 %] 100 %  BP: ()/(56-74) 78/56  Arterial Line BP: ()/(50-85) 96/59     Weight: 130.2 kg (287 lb) (08/17/19 0500)  Body mass index is 43.64 kg/m².  Body surface area is 2.5 meters squared.    I/O last 3 completed shifts:  In: 52724 [I.V.:82760; NG/GT:1500]  Out: 21176 [Drains:40; Other:06720]    Physical Exam   Constitutional: She appears ill. No distress. She is sedated, intubated and restrained.   HENT:   Head: Normocephalic and atraumatic.   Right Ear: External ear normal.   Left Ear: External ear normal.   Eyes: Pupils  are equal, round, and reactive to light. Conjunctivae and EOM are normal. Right eye exhibits no discharge. Left eye exhibits no discharge. No scleral icterus.   Neck: Neck supple.   trialysis catheter in place. Central line in place   Cardiovascular: Normal rate, normal heart sounds and intact distal pulses.   No murmur heard.  Pulses:       Radial pulses are 2+ on the right side, and 2+ on the left side.   Pulmonary/Chest: No stridor. She is intubated. No respiratory distress. She has wheezes.   Abdominal: Soft. There is no tenderness.   Femoral A-line in place   Musculoskeletal: She exhibits no tenderness.   Neurological: She is alert.   Follows basic commands.   Skin: Skin is warm and dry. She is not diaphoretic. No erythema.   Nursing note and vitals reviewed.      Significant Labs:  CBC:   Recent Labs   Lab 08/17/19  0407   WBC 23.42*   RBC 2.83*   HGB 7.3*   HCT 26.2*      MCV 93   MCH 25.8*   MCHC 27.9*     CMP:   Recent Labs   Lab 08/17/19  0407   *  178*   CALCIUM 7.9*  7.9*   ALBUMIN 2.1*  2.1*   PROT 5.7*     136   K 4.7  4.7   CO2 17*  17*     108   BUN 21*  21*   CREATININE 1.1  1.1   ALKPHOS 110   ALT 17   AST 23   BILITOT 0.6     All labs within the past 24 hours have been reviewed.     Significant Imaging:  Labs: Reviewed

## 2019-08-17 NOTE — PROGRESS NOTES
[facility]  Nephrology  Progress Note    Patient Name: Sisi Medel  MRN: [id number]  Admission Date: 8/3/2019  Hospital Length of Stay: 14 days  Attending Provider: Edward Le*   Primary Care Physician: Carlos A Caputo MD  Principal Problem:Acute on chronic respiratory failure with hypoxia and hypercapnia    Subjective:     HPI: 59 y/o Black or  woman with PMHx of pulmonary sarcoidosis on 3-5L home oxygen, Class 3 HFrEF (3/2019 EF 35%), perm Afib/flutter s/p 6/2017 AVN ablation & PPM, ESRD (TTS), Type 2 DM (3/2019 A1c 6.2%), pulmonary HTN, YOANDY, and orthostatic hypotension admitted to [facility] after presenting with SOB after completing her HD session on Saturday, 8/3/2019.  She could not complete her treatment, stopped at 1.6L removal because she developed respiratory distress, placed on nonrebreather mask and transferred here for further workup.     Nephrology consulted for management of ESRD and HD treatments.     Outpatient HD unit: [facility]   -Nephrologist: Patient does not know  -HD tx days: TThS  -HD tx time: 4hrs 45mins    -HD access: LUE AVF  -HD modality: iHD  -Residual urine: Minimal  -EDW: 109 kg    Interval History: Patient seen and examined at bedside, no acute events overnight. On SCUF 9 liters removed on UF. Currently on negative balance.     Review of patient's allergies indicates:   Allergen Reactions    Bactrim [sulfamethoxazole-trimethoprim] Other (See Comments)     Causes renal failure    Nsaids (non-steroidal anti-inflammatory drug) Other (See Comments)     Renal failure     Current Facility-Administered Medications   Medication Frequency    0.9%  NaCl infusion (CRRT USE ONLY) Continuous    acetaminophen tablet 650 mg Q4H PRN    albuterol-ipratropium 2.5 mg-0.5 mg/3 mL nebulizer solution 3 mL Q6H PRN    chlorhexidine 0.12 % solution 15 mL BID    cinacalcet tablet 30 mg Daily with breakfast    clopidogrel tablet 75 mg Daily     dexmedetomidine (PRECEDEX) 400mcg/100mL 0.9% NaCL infusion Continuous    dextrose 10% (D10W) Bolus PRN    dextrose 10% (D10W) Bolus PRN    dextrose 50% injection 25 g PRN    epoetin lily-epbx injection 5,500 Units Every Mon, Wed, Fri    famotidine tablet 20 mg Daily    fentaNYL 2500 mcg in 0.9% sodium chloride 250 mL infusion premix (titrating) Continuous    fentaNYL injection 25 mcg Q1H PRN    fludrocortisone tablet 100 mcg Daily    glucagon (human recombinant) injection 1 mg PRN    glucose chewable tablet 16 g PRN    glucose chewable tablet 24 g PRN    heparin 25,000 units in dextrose 5% (100 units/ml) IV bolus from bag - ADDITIONAL PRN BOLUS - 30 units/kg PRN    heparin 25,000 units in dextrose 5% (100 units/ml) IV bolus from bag - ADDITIONAL PRN BOLUS - 60 units/kg PRN    heparin 25,000 units in dextrose 5% 250 mL (100 units/mL) infusion LOW INTENSITY nomogram - [facility] Continuous    hydrocortisone sodium succinate injection 100 mg Q8H    insulin regular (Humulin R) 100 Units in sodium chloride 0.9% 100 mL infusion Continuous    levETIRAcetam in NaCl (iso-os) IVPB 500 mg Q12H    LORazepam tablet 0.5 mg Q8H PRN    meropenem-0.9% sodium chloride 1 g/50 mL IVPB Q12H    miconazole NITRATE 2 % top powder BID    midodrine tablet 10 mg TID    norepinephrine 4 mg in dextrose 5% 250 mL infusion (premix) (titrating) Continuous    ondansetron disintegrating tablet 8 mg Q8H PRN    polyethylene glycol packet 17 g BID    prednisoLONE acetate 1 % ophthalmic suspension 1 drop TID    propofol (DIPRIVAN) 10 mg/mL infusion Continuous    senna-docusate 8.6-50 mg per tablet 1 tablet BID    sodium chloride 0.9% flush 10 mL PRN    sulfamethoxazole-trimethoprim 800-160mg per tablet 1 tablet BID    tiZANidine tablet 4 mg Nightly PRN    valproate (DEPACON) 250 mg in dextrose 5 % 50 mL IVPB Q8H    vasopressin (PITRESSIN) 0.2 Units/mL in dextrose 5 % 100 mL infusion Continuous     Facility-Administered  Medications Ordered in Other Encounters   Medication Frequency    ceFAZolin injection 2 g On Call Procedure    lidocaine (PF) 10 mg/ml (1%) injection 10 mg Once    mupirocin 2 % ointment On Call Procedure    sodium chloride 0.9% flush 10 mL PRN       Objective:     Vital Signs (Most Recent):  Temp: 97.7 °F (36.5 °C) (08/17/19 1000)  Pulse: 70 (08/17/19 1301)  Resp: (!) 28 (08/17/19 1106)  BP: (!) 78/56 (08/17/19 1106)  SpO2: 100 % (08/17/19 1301)  O2 Device (Oxygen Therapy): ventilator (08/17/19 1106) Vital Signs (24h Range):  Temp:  [93.3 °F (34.1 °C)-99.9 °F (37.7 °C)] 97.7 °F (36.5 °C)  Pulse:  [66-73] 70  Resp:  [18-28] 28  SpO2:  [98 %-100 %] 100 %  BP: ()/(56-74) 78/56  Arterial Line BP: ()/(50-85) 96/59     Weight: 130.2 kg (287 lb) (08/17/19 0500)  Body mass index is 43.64 kg/m².  Body surface area is 2.5 meters squared.    I/O last 3 completed shifts:  In: 22705 [I.V.:66975; NG/GT:1500]  Out: 55668 [Drains:40; Other:24976]    Physical Exam   Constitutional: She appears ill. No distress. She is sedated, intubated and restrained.   HENT:   Head: Normocephalic and atraumatic.   Right Ear: External ear normal.   Left Ear: External ear normal.   Eyes: Pupils are equal, round, and reactive to light. Conjunctivae and EOM are normal. Right eye exhibits no discharge. Left eye exhibits no discharge. No scleral icterus.   Neck: Neck supple.   trialysis catheter in place. Central line in place   Cardiovascular: Normal rate, normal heart sounds and intact distal pulses.   No murmur heard.  Pulses:       Radial pulses are 2+ on the right side, and 2+ on the left side.   Pulmonary/Chest: No stridor. She is intubated. No respiratory distress. She has wheezes.   Abdominal: Soft. There is no tenderness.   Femoral A-line in place   Musculoskeletal: She exhibits no tenderness.   Neurological: She is alert.   Follows basic commands.   Skin: Skin is warm and dry. She is not diaphoretic. No erythema.   Nursing  note and vitals reviewed.      Significant Labs:  CBC:   Recent Labs   Lab 08/17/19  0407   WBC 23.42*   RBC 2.83*   HGB 7.3*   HCT 26.2*      MCV 93   MCH 25.8*   MCHC 27.9*     CMP:   Recent Labs   Lab 08/17/19  0407   *  178*   CALCIUM 7.9*  7.9*   ALBUMIN 2.1*  2.1*   PROT 5.7*     136   K 4.7  4.7   CO2 17*  17*     108   BUN 21*  21*   CREATININE 1.1  1.1   ALKPHOS 110   ALT 17   AST 23   BILITOT 0.6     All labs within the past 24 hours have been reviewed.     Significant Imaging:  Labs: Reviewed    Assessment/Plan:     ESRD on hemodialysis  Outpatient HD unit: [facility]   -Nephrologist: Patient does not know  -HD tx days: TThS  -HD tx time: 4hrs 45mins    -HD access: LUE AVG  -HD modality: iHD  -Residual urine: Minimal  -EDW: 109 kg     Plan:  - on SCUF for volume management, will change to SLED due to worsening bicarbonate   - Target -400 ml/hr as tolerated by BP, keep MAP >65. Titrate drips as indicated.   - Recommend repeating CXR  - Currently on 1 pressor: vasopressin   - Continue on mechanical ventilation with FiO at 35%  - Pt on heparin infusion, no need for citrate   - Epo ordered   - Continue to monitor intake and output, daily weights   - Avoid nephrotoxic medication and renal dose medications to GFR  - Will follow closely        Thank you for your consult. I will follow-up with patient. Please contact us if you have any additional questions.    Evan Leigh MD  Nephrology  [facility]

## 2019-08-17 NOTE — ASSESSMENT & PLAN NOTE
Outpatient HD unit: [facility]   -Nephrologist: Patient does not know  -HD tx days: TThS  -HD tx time: 4hrs 45mins    -HD access: LUE AVG  -HD modality: iHD  -Residual urine: Minimal  -EDW: 109 kg     Plan:  - on SCUF for volume management, will change to SLED due to worsening bicarbonate   - Target -400 ml/hr as tolerated by BP, keep MAP >65. Titrate drips as indicated.   - Recommend repeating CXR  - Currently on 1 pressor: vasopressin   - Continue on mechanical ventilation with FiO at 35%  - Pt on heparin infusion, no need for citrate   - Epo ordered   - Continue to monitor intake and output, daily weights   - Avoid nephrotoxic medication and renal dose medications to GFR  - Will follow closely

## 2019-08-17 NOTE — ASSESSMENT & PLAN NOTE
On 10 mg midodrine TID at home. Currently on levophed (low dose) and vaso 0.4 based on a-line. On fentanyl, precedex ggt.   - Continue midodrine  - levophed/vaso - titrate to map 65  - Pt has been able to maintain BP for CRRT w/ pressor support  - Wean pressor support as tolerated

## 2019-08-17 NOTE — ASSESSMENT & PLAN NOTE
Patient presents with A fib in the ED. Patient is on chronic warfarin. S/P ablation multiple times.     -Continue heparin gtt  -Pacemaker in place

## 2019-08-17 NOTE — PROGRESS NOTES
[facility]  Critical Care Medicine  Progress Note    Patient Name: Ssii Medel  MRN: [id number]  Admission Date: 8/3/2019  Hospital Length of Stay: 14 days  Code Status: Full Code  Attending Provider: Edward Le*  Primary Care Provider: Carlos A Caputo MD   Principal Problem: Acute on chronic respiratory failure with hypoxia and hypercapnia    Subjective:     HPI:  Ms Medel  57 yo f with ESRD on HD, cervical radiculopathy, CHF, Chronic resp failure with hypoxia, T2DM, DDD, paroxysmal Afib, seizure, shingles, thyroid disorder here for a 3 day history of cough and SOB. Patient was at dialysis and had to stop at 1.6L due to resp distress. She was put in a nonrebreather at 12L of oxygen. She was transported via EMS. Overnight she required BiPAP for worsening SOB. Rapid response was called this morning because she was hypotensive 70s/50s. Dr. Rosas, MICU team, went and recheck BP was 107/59. She later became hypertensive again and continued to be on BiPAP, she was stepped up for dialysis, BP monitoring, possible vasopressor requirement and further respiratory monitoring.     During step up she was persistently hypoglycemic and hypotensive, peripheral vascular access was lost and a left TLC was placed at bedside. It was determined she could not tolerated her normal HD and she had tenuous respiratory status, so decision was made to intubate. She has been started on broad spectrum abx, and right HD IJ catheter was placed. Nephro been made aware and SCUF planned for today.     Hospital/ICU Course:  Admitted on 08/03 for what appeared to be heart failure exacerbation. Care escalated to ICU due to her tenuous respirashe was intubated and started on broad spectrum abx. She was unable to tolerate her HD so patient has been on CRRT. In evaluating her AMS, CTH was unremarkable and EEG was consistent with encephalopathy. Patient had worsening leukocytosis and BAL on 8/9 was positive for  klebsiella. She is currently on meropenem. Due to chronic steroid use for her sarcoidosis, she is also on bactrim for PJP prophylaxis.  Frequent SAT results in significant tachypnea and agitation. On 8/10 extubation was attempted but failed so was re-intubated for increased work of breathing and tachypnea.   Pressor requirements have decreased however they still need to be raised when pt becomes more alert. On 8/16 pt had several hours during which temp fell to 94, but had resolved by the morning.    Interval History/Significant Events: Pt remained on sedatives intermittently. Pressors continue to be needed during CRRT. Pt had an episode of hypothermia (93.3 Tlow) for several hours; a lacey hugger was applied and resolved by 8/17. WBC increased. Will continue to monitor for other signs of decompensation.    Review of Systems   Unable to perform ROS: Intubated   Psychiatric/Behavioral: Positive for agitation (When sedation is weaned).     Objective:     Vital Signs (Most Recent):  Temp: 97.7 °F (36.5 °C) (08/17/19 1000)  Pulse: 70 (08/17/19 1106)  Resp: (!) 28 (08/17/19 1106)  BP: (!) 78/56 (08/17/19 1106)  SpO2: 99 % (08/17/19 1106) Vital Signs (24h Range):  Temp:  [93.3 °F (34.1 °C)-99.9 °F (37.7 °C)] 97.7 °F (36.5 °C)  Pulse:  [66-73] 70  Resp:  [18-28] 28  SpO2:  [98 %-100 %] 99 %  BP: ()/(56-74) 78/56  Arterial Line BP: ()/(50-85) 96/59   Weight: 130.2 kg (287 lb)  Body mass index is 43.64 kg/m².      Intake/Output Summary (Last 24 hours) at 8/17/2019 1241  Last data filed at 8/17/2019 1100  Gross per 24 hour   Intake 8808 ml   Output 7945 ml   Net 863 ml       Physical Exam   Constitutional: She appears ill. No distress. She is sedated, intubated and restrained.   HENT:   Head: Normocephalic and atraumatic.   Right Ear: External ear normal.   Left Ear: External ear normal.   Eyes: Pupils are equal, round, and reactive to light. Conjunctivae and EOM are normal. Right eye exhibits no discharge. Left  eye exhibits no discharge. No scleral icterus.   Neck: Neck supple.   trialysis catheter in place. Central line in place   Cardiovascular: Normal rate, normal heart sounds and intact distal pulses.   No murmur heard.  Pulses:       Radial pulses are 2+ on the right side, and 2+ on the left side.   Pulmonary/Chest: No stridor. She is intubated. No respiratory distress. She has wheezes.   Abdominal: Soft. There is no tenderness.   Femoral A-line in place   Musculoskeletal: She exhibits no tenderness.   Neurological: She is alert.   Follows basic commands.   Skin: Skin is warm and dry. She is not diaphoretic. No erythema.   Nursing note and vitals reviewed.      Vents:  Vent Mode: A/C (08/17/19 1106)  Ventilator Initiated: Yes (08/10/19 1742)  Set Rate: 18 bmp (08/17/19 1106)  Vt Set: 400 mL (08/17/19 1106)  Pressure Support: 10 cmH20 (08/12/19 0645)  PEEP/CPAP: 5 cmH20 (08/17/19 1106)  Oxygen Concentration (%): 35 (08/17/19 1106)  Peak Airway Pressure: 34 cmH2O (08/17/19 1106)  Plateau Pressure: 25 cmH20 (08/17/19 1106)  Total Ve: 13.6 mL (08/17/19 1106)  F/VT Ratio<105 (RSBI): (!) 85.11 (08/17/19 1106)  Lines/Drains/Airways     Central Venous Catheter Line                 Percutaneous Central Line Insertion/Assessment - triple lumen  08/04/19 1803 left internal jugular 12 days         Hemodialysis Catheter 08/13/19 0400 right internal jugular 4 days          Drain                 Hemodialysis AV Graft Left upper arm -- days         NG/OG Tube 08/11/19 1600 Center mouth 5 days          Airway                 Airway - Non-Surgical 08/10/19 1756 Endotracheal Tube 6 days          Arterial Line                 Arterial Line 08/13/19 1700 Right Femoral 3 days          Peripheral Intravenous Line                 Peripheral IV - Single Lumen 08/16/19 0614 20 G Posterior;Right Hand 1 day              Significant Labs:    CBC/Anemia Profile:  Recent Labs   Lab 08/16/19  0353 08/17/19  0407   WBC 18.21* 23.42*   HGB 7.3* 7.3*    HCT 25.7* 26.2*    153   MCV 90 93   RDW 24.8* 25.1*        Chemistries:  Recent Labs   Lab 08/16/19  0353 08/16/19  1355 08/16/19  2207 08/17/19  0407     137 137 137 136  136   K 4.8  4.8 3.8 3.8 4.7  4.7     107 106 108 108  108   CO2 21*  21* 23 16* 17*  17*   BUN 7  7 10 14 21*  21*   CREATININE 0.5  0.5 0.7 0.8 1.1  1.1   CALCIUM 7.7*  7.7* 8.0* 7.9* 7.9*  7.9*   ALBUMIN 2.1*  2.1* 2.0* 2.2* 2.1*  2.1*   PROT 5.7*  --   --  5.7*   BILITOT 1.0  --   --  0.6   ALKPHOS 109  --   --  110   ALT 17  --   --  17   AST 24  --   --  23   MG 1.9 2.2 1.9 1.9   PHOS 2.6*  2.6* 2.7 5.2* 5.1*  5.1*       All pertinent labs within the past 24 hours have been reviewed.    Significant Imaging:  I have reviewed all pertinent imaging results/findings within the past 24 hours.      ABG  Recent Labs   Lab 08/17/19  0515   PH 7.359   PO2 130*   PCO2 34.1*   HCO3 19.2*   BE -6     Assessment/Plan:     Neuro  Encephalopathy  Mental status stable. Currently on precedex and fentanyl for sedation  - CTH unremarkable, EEG 8/7 showed encephalopathy    Epilepsy  Continue keppra. EEG showed encephalopathy.     Pulmonary  * Acute on chronic respiratory failure with hypoxia and hypercapnia  - Respiratory failure due to a combination of pulmonary edema secondary to CHF and ESRD and recent pneumonia.  - Remains intubated since admission, attempted extubated on 8/10, but re-intubated for increased work of breathing and tachypnea  - Continue meropenem for klebsiella infection  - Continue bactrim for PJP prophylaxis  - Antibiotics per ID recs  - Continue CRRT for net negative volume loss        Pneumonia  Course lung sounds and CXR concerning for PNA in LLL with interstitial fullness with leukocytosis without fevers. Respiratory cultures positive. WBC slight uptrend w/ episode of hypothermia yesterday.   - ETT culture from 8/4 + MSSA, PSA and moraxella   - continue bactrim for PJP prophylaxis w/ steroid  use   - respiratory cx positive for ESBL klebsiella oxytoca - on meropenem   - AFB, PJP negative   - Blood cultures no growth to date   - continue to trend trend WBC (slightly uptrended)   - ID signed off      Pulmonary hypertension  - Likely Group 2 pHTN related to HFrEF. Continue on dialysis   - TTE (8/6/2019): PASP of 50        Cardiac/Vascular  Permanent atrial fibrillation  Patient presents with A fib in the ED. Patient is on chronic warfarin. S/P ablation multiple times.     -Continue heparin gtt  -Pacemaker in place       Acute on chronic systolic congestive heart failure  Patient has hx of HF (EF 35%). CXR in the ED suggestive of HF. BNP 1424 and troponins 0.156.  Adherence to home medication regimen is unclear.  - Repeat TTE EF 25% on 8/11, LVH, G3DD, LAE/JAGUAR, moderate RV systolic dysfunction   - Continue CRRT  - Pressor requirements stable, currently on levo and vasopressin.     Hypotension  On 10 mg midodrine TID at home. Currently on levophed (low dose) and vaso 0.4 based on a-line. On fentanyl, precedex ggt.   - Continue midodrine  - levophed/vaso - titrate to map 65  - Pt has been able to maintain BP for CRRT w/ pressor support  - Wean pressor support as tolerated      Renal/  ESRD on hemodialysis  - continue CRRT for goal of net neutral I/O, vasopressor requirement stable  - follow nephrology recommendation    Immunology/Multi System  Pulmonary sarcoidosis  - Continue stress dose steroids while in septic shock.   - Started on bactrim per ID with chronic steroid use for PCP prophylaxis    Hematology  Current use of long term anticoagulation  - heparin gtt while IP  - no evidence of hepatic dysfunction on labs - minor bili bump      Endocrine  Type 2 diabetes mellitus, without long-term current use of insulin  - Home medication: linagliptin.   - Glucose elevated over past 24 hr, but better managed: 141>131>133>199>222  - Continue insulin ggt    GI  Constipation  - senna and colace    Other  Class 2  severe obesity due to excess calories with serious comorbidity and body mass index (BMI) of 38.0 to 38.9 in adult  Will need to revisit weight loss when improving    Hypoalbuminemia  Currently on tube feeds.  - At 40 with a goal of 40.  - On renal novasource     Current chronic use of systemic steroids  - Evidence of PJP ppx on MAR  - Stress dose during septic shock   - Bactrim for PJP prophylaxis    Counseling regarding goals of care  Attempted to discuss goals of care with family on 8/10. Patient's adult children would not like to have this discussion at this time.    On 8/15, pt's son spoke with Dr. King regarding GOC. He was unsure about making any decisions at this point. Will continue to revisit.    Continuing to perform SAT and SBT daily, as tolerated       Critical Care Daily Checklist:    A: Awake: RASS Goal/Actual Goal: RASS Goal: -2-->light sedation  Actual: Horton Agitation Sedation Scale (RASS): Light sedation   B: Spontaneous Breathing Trial Performed? Spon. Breathing Trial Initiated?: Initiated (08/10/19 0800)   C: SAT & SBT Coordinated?  Yes                      D: Delirium: CAM-ICU Overall CAM-ICU: Positive   E: Early Mobility Performed? Yes   F: Feeding Goal: Goals: Patient to meet > 85% EEN and EPN by RD follow-up  Status: Nutrition Goal Status: goal not met   Current Diet Order   Procedures    Diet NPO Except for: Medication     Order Specific Question:   Except for     Answer:   Medication      AS: Analgesia/Sedation Yes   T: Thromboembolic Prophylaxis Hep ggt   H: HOB > 300 Yes   U: Stress Ulcer Prophylaxis (if needed) Yes   G: Glucose Control Insulin ggt   B: Bowel Function Stool Occurrence: 1   I: Indwelling Catheter (Lines & Yepez) Necessity yes   D: De-escalation of Antimicrobials/Pharmacotherapies No, planned    Plan for the day/ETD SBT    Code Status:  Family/Goals of Care: Full Code         Critical secondary to Patient has a condition that poses threat to life and bodily  function: Severe Respiratory Distress      Critical care was time spent personally by me on the following activities: development of treatment plan with patient or surrogate and bedside caregivers, discussions with consultants, evaluation of patient's response to treatment, examination of patient, ordering and performing treatments and interventions, ordering and review of laboratory studies, ordering and review of radiographic studies, pulse oximetry, re-evaluation of patient's condition. This critical care time did not overlap with that of any other provider or involve time for any procedures.     CATHRYN Camp MD  OBGYN PGY1   Critical Care Medicine  [facility]

## 2019-08-17 NOTE — ASSESSMENT & PLAN NOTE
Attempted to discuss goals of care with family on 8/10. Patient's adult children would not like to have this discussion at this time.    On 8/15, pt's son spoke with Dr. King regarding GOC. He was unsure about making any decisions at this point. Will continue to revisit.    Continuing to perform SAT and SBT daily, as tolerated

## 2019-08-18 NOTE — ASSESSMENT & PLAN NOTE
Outpatient HD unit: [facility]   -Nephrologist: Patient does not know  -HD tx days: TThS  -HD tx time: 4hrs 45mins    -HD access: LUE AVG  -HD modality: iHD  -Residual urine: Minimal  -EDW: 109 kg     Plan:  - on SLED, will continue for volume management   - Target -400 ml/hr as tolerated by BP, keep MAP >65. Titrate drips as indicated.   - Recommend repeating CXR  - off pressors currently   - Continue on mechanical ventilation with FiO at 35%  - Pt on heparin infusion, no need for citrate   - Continue to monitor intake and output, daily weights   - Avoid nephrotoxic medication and renal dose medications to GFR  - Will follow closely

## 2019-08-18 NOTE — ASSESSMENT & PLAN NOTE
Patient has hx of HF (EF 35%). CXR in the ED suggestive of HF. BNP 1424 and troponins 0.156.  Adherence to home medication regimen is unclear.  - Repeat TTE EF 25% on 8/11, LVH, G3DD, LAE/JAGUAR, moderate RV systolic dysfunction   - Continue CRRT  - Pressor needs increased, currently on levo and vasopressin.

## 2019-08-18 NOTE — ASSESSMENT & PLAN NOTE
- Continue stress dose steroids while in septic shock (since 8/5)  - Started on bactrim per ID with chronic steroid use for PCP prophylaxis

## 2019-08-18 NOTE — PROGRESS NOTES
[facility]  Critical Care Medicine  Progress Note    Patient Name: Sisi Medel  MRN: [id number]  Admission Date: 8/3/2019  Hospital Length of Stay: 15 days  Code Status: Full Code  Attending Provider: Edward Le*  Primary Care Provider: Carlos A Caputo MD   Principal Problem: Acute on chronic respiratory failure with hypoxia and hypercapnia    Subjective:     HPI:  Ms Medel  59 yo f with ESRD on HD, cervical radiculopathy, CHF, Chronic resp failure with hypoxia, T2DM, DDD, paroxysmal Afib, seizure, shingles, thyroid disorder here for a 3 day history of cough and SOB. Patient was at dialysis and had to stop at 1.6L due to resp distress. She was put in a nonrebreather at 12L of oxygen. She was transported via EMS. Overnight she required BiPAP for worsening SOB. Rapid response was called this morning because she was hypotensive 70s/50s. Dr. Rosas, MICU team, went and recheck BP was 107/59. She later became hypertensive again and continued to be on BiPAP, she was stepped up for dialysis, BP monitoring, possible vasopressor requirement and further respiratory monitoring.     During step up she was persistently hypoglycemic and hypotensive, peripheral vascular access was lost and a left TLC was placed at bedside. It was determined she could not tolerated her normal HD and she had tenuous respiratory status, so decision was made to intubate. She has been started on broad spectrum abx, and right HD IJ catheter was placed. Nephro been made aware and SCUF planned for today.     Hospital/ICU Course:  Admitted on 08/03 for what appeared to be heart failure exacerbation. Care escalated to ICU due to her tenuous respirashe was intubated and started on broad spectrum abx. She was unable to tolerate her HD so patient has been on CRRT. In evaluating her AMS, CTH was unremarkable and EEG was consistent with encephalopathy. Patient had worsening leukocytosis and BAL on 8/9 was positive for  klebsiella. She is currently on meropenem. Due to chronic steroid use for her sarcoidosis, she is also on bactrim for PJP prophylaxis.  Frequent SAT results in significant tachypnea and agitation. On 8/10 extubation was attempted but failed so was re-intubated for increased work of breathing and tachypnea.   Pressor requirements have decreased however they still need to be raised when pt becomes more alert. On 8/16 pt had several hours during which temp fell to 94, but had resolved by the morning. On 8/18 pt pressor requirements increased. Dr. Le spoke with son regarding goals of care, in which he voiced that his mother wouldn't want to be on long term ventilator and dialysis support.    Interval History/Significant Events: Overnight increased pressor support was needed. Pt failed SAB and SBT today (PAV). Son spoke with Dr. Le regarding GOC. Likely, the family will opt not to persue tracheotomy and will make an ultimate decision by next Thursday or Friday.    Review of Systems   Unable to perform ROS: Intubated   Psychiatric/Behavioral: Positive for agitation (When sedation is weaned).     Objective:     Vital Signs (Most Recent):  Temp: 98.1 °F (36.7 °C) (08/18/19 0900)  Pulse: 74 (08/18/19 1114)  Resp: 18 (08/18/19 0514)  BP: (!) 85/60 (08/18/19 0800)  SpO2: 97 % (08/18/19 1114) Vital Signs (24h Range):  Temp:  [97.5 °F (36.4 °C)-100.1 °F (37.8 °C)] 98.1 °F (36.7 °C)  Pulse:  [61-75] 74  Resp:  [18] 18  SpO2:  [88 %-100 %] 97 %  BP: (74-99)/(55-72) 85/60  Arterial Line BP: ()/(43-76) 107/49   Weight: 130.2 kg (287 lb)  Body mass index is 43.64 kg/m².      Intake/Output Summary (Last 24 hours) at 8/18/2019 1135  Last data filed at 8/18/2019 1000  Gross per 24 hour   Intake 7199 ml   Output 7041 ml   Net 158 ml       Physical Exam   Constitutional: She appears ill. No distress. She is sedated, intubated and restrained.   HENT:   Head: Normocephalic and atraumatic.   Right Ear: External ear  normal.   Left Ear: External ear normal.   Eyes: Pupils are equal, round, and reactive to light. Conjunctivae and EOM are normal. Right eye exhibits no discharge. Left eye exhibits no discharge. No scleral icterus.   Neck: Neck supple.   trialysis catheter in place. Central line in place   Cardiovascular: Normal rate, normal heart sounds and intact distal pulses.   No murmur heard.  Pulses:       Radial pulses are 2+ on the right side, and 2+ on the left side.   Pulmonary/Chest: No stridor. She is intubated. No respiratory distress. She has wheezes.   Abdominal: Soft. There is no tenderness.   Femoral A-line in place   Musculoskeletal: She exhibits no tenderness.   Neurological: She is alert.   Follows basic commands.   Skin: Skin is warm and dry. She is not diaphoretic. No erythema.   Nursing note and vitals reviewed.      Vents:  Vent Mode: Spont (08/18/19 1114)  Ventilator Initiated: Yes (08/10/19 1742)  Set Rate: 18 bmp (08/18/19 0916)  Vt Set: 400 mL (08/18/19 0916)  Pressure Support: 10 cmH20 (08/12/19 0645)  PEEP/CPAP: 5 cmH20 (08/18/19 1114)  Oxygen Concentration (%): 35 (08/18/19 1114)  Peak Airway Pressure: 28 cmH2O (08/18/19 1114)  Plateau Pressure: 25 cmH20 (08/18/19 1114)  Total Ve: 11.5 mL (08/18/19 1114)  F/VT Ratio<105 (RSBI): (!) 46.63 (08/18/19 0514)  Lines/Drains/Airways     Central Venous Catheter Line                 Percutaneous Central Line Insertion/Assessment - triple lumen  08/04/19 1803 left internal jugular 13 days         Hemodialysis Catheter 08/13/19 0400 right internal jugular 5 days          Drain                 Hemodialysis AV Graft Left upper arm -- days         NG/OG Tube 08/11/19 1600 Center mouth 6 days          Airway                 Airway - Non-Surgical 08/10/19 1756 Endotracheal Tube 7 days          Arterial Line                 Arterial Line 08/13/19 1700 Right Femoral 4 days          Peripheral Intravenous Line                 Peripheral IV - Single Lumen 08/16/19 0614 20  G Posterior;Right Hand 2 days              Significant Labs:    CBC/Anemia Profile:  Recent Labs   Lab 08/17/19  0407 08/18/19  0409   WBC 23.42* 21.97*   HGB 7.3* 7.7*   HCT 26.2* 26.8*    140*   MCV 93 91   RDW 25.1* 24.8*        Chemistries:  Recent Labs   Lab 08/17/19  0407 08/17/19  1421 08/17/19  2106 08/18/19  0409     136 138 138 137  137   K 4.7  4.7 4.3 4.4 4.6  4.6     108 110 108 105  105   CO2 17*  17* 16* 20* 22*  22*   BUN 21*  21* 27* 11 6  6   CREATININE 1.1  1.1 1.5* 0.7 0.6  0.6   CALCIUM 7.9*  7.9* 7.9* 8.0* 8.1*  8.1*   ALBUMIN 2.1*  2.1* 2.2* 2.2* 2.1*  2.1*   PROT 5.7*  --   --  6.0   BILITOT 0.6  --   --  0.8   ALKPHOS 110  --   --  115   ALT 17  --   --  16   AST 23  --   --  17   MG 1.9 2.4 2.1 1.9   PHOS 5.1*  5.1* 5.2* 2.8 2.4*  2.4*       All pertinent labs within the past 24 hours have been reviewed.    Significant Imaging:  I have reviewed all pertinent imaging results/findings within the past 24 hours.      ABG  Recent Labs   Lab 08/18/19  0421   PH 7.444   PO2 81   PCO2 32.7*   HCO3 22.4*   BE -2     Assessment/Plan:     Neuro  Encephalopathy  Mental status stable. Currently on precedex and fentanyl for sedation  - CTH unremarkable, EEG 8/7 showed encephalopathy    Epilepsy  Continue keppra. EEG showed encephalopathy.     Pulmonary  * Acute on chronic respiratory failure with hypoxia and hypercapnia  - Respiratory failure due to a combination of pulmonary edema secondary to CHF and ESRD and recent pneumonia.  - Remains intubated since admission, attempted extubated on 8/10, but re-intubated for increased work of breathing and tachypnea  - Continue meropenem for klebsiella infection for 14d  - Continue bactrim for PJP prophylaxis  - Antibiotics per ID recs  - Continue CRRT for net negative volume loss        Pneumonia  Course lung sounds and CXR concerning for PNA in LLL with interstitial fullness with leukocytosis without fevers. Respiratory  cultures positive. WBC slight uptrend w/ episode of hypothermia yesterday.   - ETT culture from 8/4 + MSSA, PSA and moraxella   - continue bactrim for PJP prophylaxis w/ steroid use   - respiratory cx positive for ESBL klebsiella oxytoca - on meropenem   - AFB, PJP negative   - Blood cultures no growth to date   - continue to trend trend WBC (stable from yesterday 24>23); no fever/hypothermia   - ID signed off      Pulmonary hypertension  - Likely Group 2 pHTN related to HFrEF. Continue on dialysis   - TTE (8/6/2019): PASP of 50        Cardiac/Vascular  Permanent atrial fibrillation  Patient presents with A fib in the ED. Patient is on chronic warfarin. S/P ablation multiple times.     -Continue heparin gtt  -Pacemaker in place       Acute on chronic systolic congestive heart failure  Patient has hx of HF (EF 35%). CXR in the ED suggestive of HF. BNP 1424 and troponins 0.156.  Adherence to home medication regimen is unclear.  - Repeat TTE EF 25% on 8/11, LVH, G3DD, LAE/JAGUAR, moderate RV systolic dysfunction   - Continue CRRT  - Pressor needs increased, currently on levo and vasopressin.     Hypotension  On 10 mg midodrine TID at home. Currently on levophed and vaso 0.4 based on a-line. On fentanyl, precedex ggt.   - Continue midodrine  - levophed/vaso - titrate to map 65   - Pressor support greatly increased over last 24hr  - Pt has been able to maintain BP for CRRT w/ increased pressor support  - Wean pressor support if tolerated      Renal/  ESRD on hemodialysis  - continue CRRT for goal of net neutral I/O, vasopressor requirement increased  - follow nephrology recommendation    Immunology/Multi System  Pulmonary sarcoidosis  - Continue stress dose steroids while in septic shock (since 8/5)  - Started on bactrim per ID with chronic steroid use for PCP prophylaxis    Hematology  Current use of long term anticoagulation  - heparin gtt while IP  - no evidence of hepatic dysfunction on labs - minor bili bump       Endocrine  Type 2 diabetes mellitus, without long-term current use of insulin  - Home medication: linagliptin.   - Glucose elevated over past 24 hr.  - Continue insulin ggt    GI  Constipation  - senna and colace    Other  Class 2 severe obesity due to excess calories with serious comorbidity and body mass index (BMI) of 38.0 to 38.9 in adult  Will need to revisit weight loss when improving    Hypoalbuminemia  Currently on tube feeds.  - Temporarily stopped. Will restart  - On renal novasource     Current chronic use of systemic steroids  - Evidence of PJP ppx on MAR  - Stress dose during septic shock since 8/5; no change in treatment plan today  - Bactrim for PJP prophylaxis    Counseling regarding goals of care  Attempted to discuss goals of care with family on 8/10. Patient's adult children would not like to have this discussion at this time.    On 8/15, pt's son spoke with Dr. Le regarding GOC. He was unsure about making any decisions at this point. Will continue to revisit.    On 8/18, Dr. Le spoke again with pt's son. No concrete decsisions were made, however he seemed believe that his mother would not want to be on long-term dialysis and/or ventilator support (I.e. Not interested in trach). He wants to wait until 8/22-23 to make a concrete decision on what to do regarding changes in care). Consulting palliative care so that family can better understand pt's prognosis and be informed to make decisions regarding future care.       Critical Care Daily Checklist:    A: Awake: RASS Goal/Actual Goal: RASS Goal: -2-->light sedation  Actual: Horton Agitation Sedation Scale (RASS): Light sedation   B: Spontaneous Breathing Trial Performed? Spon. Breathing Trial Initiated?: Initiated (08/10/19 0800)   C: SAT & SBT Coordinated?  Yes                      D: Delirium: CAM-ICU Overall CAM-ICU: Positive   E: Early Mobility Performed? Yes   F: Feeding Goal: Goals: Patient to meet > 85% EEN and EPN by DENNY  follow-up  Status: Nutrition Goal Status: goal not met   Current Diet Order   Procedures    Diet NPO Except for: Medication     Order Specific Question:   Except for     Answer:   Medication      AS: Analgesia/Sedation Prop   T: Thromboembolic Prophylaxis Yes   H: HOB > 300 Yes   U: Stress Ulcer Prophylaxis (if needed) Yes   G: Glucose Control Insulin ggt   B: Bowel Function Stool Occurrence: 1   I: Indwelling Catheter (Lines & Yepez) Necessity Yes   D: De-escalation of Antimicrobials/Pharmacotherapies In plan    Plan for the day/ETD GOC    Code Status:  Family/Goals of Care: Full Code         Critical secondary to Patient has a condition that poses threat to life and bodily function: Severe Respiratory Distress      Critical care was time spent personally by me on the following activities: development of treatment plan with patient or surrogate and bedside caregivers, discussions with consultants, evaluation of patient's response to treatment, examination of patient, ordering and performing treatments and interventions, ordering and review of laboratory studies, ordering and review of radiographic studies, pulse oximetry, re-evaluation of patient's condition. This critical care time did not overlap with that of any other provider or involve time for any procedures.     CATHRYN Camp MD  OBGYN PGY1   Critical Care Medicine  [facility]

## 2019-08-18 NOTE — SUBJECTIVE & OBJECTIVE
Interval History/Significant Events: Overnight increased pressor support was needed. Pt failed SAB and SBT today (PAV). Son spoke with Dr. Le regarding GOC. Likely, the family will opt not to persue tracheotomy and will make an ultimate decision by next Thursday or Friday.    Review of Systems   Unable to perform ROS: Intubated   Psychiatric/Behavioral: Positive for agitation (When sedation is weaned).     Objective:     Vital Signs (Most Recent):  Temp: 98.1 °F (36.7 °C) (08/18/19 0900)  Pulse: 74 (08/18/19 1114)  Resp: 18 (08/18/19 0514)  BP: (!) 85/60 (08/18/19 0800)  SpO2: 97 % (08/18/19 1114) Vital Signs (24h Range):  Temp:  [97.5 °F (36.4 °C)-100.1 °F (37.8 °C)] 98.1 °F (36.7 °C)  Pulse:  [61-75] 74  Resp:  [18] 18  SpO2:  [88 %-100 %] 97 %  BP: (74-99)/(55-72) 85/60  Arterial Line BP: ()/(43-76) 107/49   Weight: 130.2 kg (287 lb)  Body mass index is 43.64 kg/m².      Intake/Output Summary (Last 24 hours) at 8/18/2019 1135  Last data filed at 8/18/2019 1000  Gross per 24 hour   Intake 7199 ml   Output 7041 ml   Net 158 ml       Physical Exam   Constitutional: She appears ill. No distress. She is sedated, intubated and restrained.   HENT:   Head: Normocephalic and atraumatic.   Right Ear: External ear normal.   Left Ear: External ear normal.   Eyes: Pupils are equal, round, and reactive to light. Conjunctivae and EOM are normal. Right eye exhibits no discharge. Left eye exhibits no discharge. No scleral icterus.   Neck: Neck supple.   trialysis catheter in place. Central line in place   Cardiovascular: Normal rate, normal heart sounds and intact distal pulses.   No murmur heard.  Pulses:       Radial pulses are 2+ on the right side, and 2+ on the left side.   Pulmonary/Chest: No stridor. She is intubated. No respiratory distress. She has wheezes.   Abdominal: Soft. There is no tenderness.   Femoral A-line in place   Musculoskeletal: She exhibits no tenderness.   Neurological: She is alert.    Follows basic commands.   Skin: Skin is warm and dry. She is not diaphoretic. No erythema.   Nursing note and vitals reviewed.      Vents:  Vent Mode: Spont (08/18/19 1114)  Ventilator Initiated: Yes (08/10/19 1742)  Set Rate: 18 bmp (08/18/19 0916)  Vt Set: 400 mL (08/18/19 0916)  Pressure Support: 10 cmH20 (08/12/19 0645)  PEEP/CPAP: 5 cmH20 (08/18/19 1114)  Oxygen Concentration (%): 35 (08/18/19 1114)  Peak Airway Pressure: 28 cmH2O (08/18/19 1114)  Plateau Pressure: 25 cmH20 (08/18/19 1114)  Total Ve: 11.5 mL (08/18/19 1114)  F/VT Ratio<105 (RSBI): (!) 46.63 (08/18/19 0514)  Lines/Drains/Airways     Central Venous Catheter Line                 Percutaneous Central Line Insertion/Assessment - triple lumen  08/04/19 1803 left internal jugular 13 days         Hemodialysis Catheter 08/13/19 0400 right internal jugular 5 days          Drain                 Hemodialysis AV Graft Left upper arm -- days         NG/OG Tube 08/11/19 1600 Center mouth 6 days          Airway                 Airway - Non-Surgical 08/10/19 1756 Endotracheal Tube 7 days          Arterial Line                 Arterial Line 08/13/19 1700 Right Femoral 4 days          Peripheral Intravenous Line                 Peripheral IV - Single Lumen 08/16/19 0614 20 G Posterior;Right Hand 2 days              Significant Labs:    CBC/Anemia Profile:  Recent Labs   Lab 08/17/19  0407 08/18/19  0409   WBC 23.42* 21.97*   HGB 7.3* 7.7*   HCT 26.2* 26.8*    140*   MCV 93 91   RDW 25.1* 24.8*        Chemistries:  Recent Labs   Lab 08/17/19  0407 08/17/19  1421 08/17/19  2106 08/18/19  0409     136 138 138 137  137   K 4.7  4.7 4.3 4.4 4.6  4.6     108 110 108 105  105   CO2 17*  17* 16* 20* 22*  22*   BUN 21*  21* 27* 11 6  6   CREATININE 1.1  1.1 1.5* 0.7 0.6  0.6   CALCIUM 7.9*  7.9* 7.9* 8.0* 8.1*  8.1*   ALBUMIN 2.1*  2.1* 2.2* 2.2* 2.1*  2.1*   PROT 5.7*  --   --  6.0   BILITOT 0.6  --   --  0.8   ALKPHOS 110  --   --   115   ALT 17  --   --  16   AST 23  --   --  17   MG 1.9 2.4 2.1 1.9   PHOS 5.1*  5.1* 5.2* 2.8 2.4*  2.4*       All pertinent labs within the past 24 hours have been reviewed.    Significant Imaging:  I have reviewed all pertinent imaging results/findings within the past 24 hours.

## 2019-08-18 NOTE — PROGRESS NOTES
CRRT:    Treatment ran for 25 hours straight. Rinsed back. Machine changed.    SLED restarted. UF rate @300ml/hr  Daily checks done

## 2019-08-18 NOTE — PROGRESS NOTES
[facility]  Nephrology  Progress Note    Patient Name: Sisi Medel  MRN: [id number]  Admission Date: 8/3/2019  Hospital Length of Stay: 15 days  Attending Provider: Edward Le*   Primary Care Physician: Carlos A Caputo MD  Principal Problem:Acute on chronic respiratory failure with hypoxia and hypercapnia    Subjective:     HPI: 57 y/o Black or  woman with PMHx of pulmonary sarcoidosis on 3-5L home oxygen, Class 3 HFrEF (3/2019 EF 35%), perm Afib/flutter s/p 6/2017 AVN ablation & PPM, ESRD (TTS), Type 2 DM (3/2019 A1c 6.2%), pulmonary HTN, YOANDY, and orthostatic hypotension [place] [facility] [place] presenting with SOB after completing her HD session on Saturday, 8/3/2019.  She could not complete her treatment, stopped at 1.6L removal because she developed respiratory distress, placed on nonrebreather mask and transferred here for further workup.     Nephrology consulted for management of ESRD and HD treatments.     Outpatient HD unit: [facility]   -Nephrologist: Patient does not know  -HD tx days: TThS  -HD tx time: 4hrs 45mins    -HD access: LUE AVF  -HD modality: iHD  -Residual urine: Minimal  -EDW: 109 kg    Interval History:  Patient seen and examined at bedside, no acute events overnight. Will reorder SLED, labs reviewed.     Review of patient's allergies indicates:   Allergen Reactions    Bactrim [sulfamethoxazole-trimethoprim] Other (See Comments)     Causes renal failure    Nsaids (non-steroidal anti-inflammatory drug) Other (See Comments)     Renal failure     Current Facility-Administered Medications   Medication Frequency    0.9%  NaCl infusion (CRRT USE ONLY) Continuous    0.9%  NaCl infusion (CRRT USE ONLY) Continuous    acetaminophen tablet 650 mg Q4H PRN    albuterol-ipratropium 2.5 mg-0.5 mg/3 mL nebulizer solution 3 mL Q6H PRN    chlorhexidine 0.12 % solution 15 mL BID    cinacalcet tablet 30 mg Daily with breakfast     clopidogrel tablet 75 mg Daily    dexmedetomidine (PRECEDEX) 400mcg/100mL 0.9% NaCL infusion Continuous    dextrose 10% (D10W) Bolus PRN    dextrose 10% (D10W) Bolus PRN    dextrose 50% injection 25 g PRN    epoetin lily-epbx injection 5,500 Units Every Mon, Wed, Fri    famotidine tablet 20 mg Daily    fentaNYL 2500 mcg in 0.9% sodium chloride 250 mL infusion premix (titrating) Continuous    fentaNYL injection 25 mcg Q1H PRN    fludrocortisone tablet 100 mcg Daily    glucagon (human recombinant) injection 1 mg PRN    glucose chewable tablet 16 g PRN    glucose chewable tablet 24 g PRN    heparin 25,000 units in dextrose 5% (100 units/ml) IV bolus from bag - ADDITIONAL PRN BOLUS - 30 units/kg PRN    heparin 25,000 units in dextrose 5% (100 units/ml) IV bolus from bag - ADDITIONAL PRN BOLUS - 60 units/kg PRN    heparin 25,000 units in dextrose 5% 250 mL (100 units/mL) infusion LOW INTENSITY nomogram - [facility] Continuous    hydrocortisone sodium succinate injection 100 mg Q8H    insulin regular (Humulin R) 100 Units in sodium chloride 0.9% 100 mL infusion Continuous    levETIRAcetam in NaCl (iso-os) IVPB 500 mg Q12H    LORazepam tablet 0.5 mg Q8H PRN    magnesium sulfate 2g in water 50mL IVPB (premix) PRN    meropenem (MERREM) 2 g in sodium chloride 0.9% 100 mL IVPB Q8H    miconazole NITRATE 2 % top powder BID    midodrine tablet 10 mg TID    norepinephrine 8 mg in dextrose 5 % 250 mL infusion Continuous    ondansetron disintegrating tablet 8 mg Q8H PRN    polyethylene glycol packet 17 g BID    prednisoLONE acetate 1 % ophthalmic suspension 1 drop TID    propofol (DIPRIVAN) 10 mg/mL infusion Continuous    senna-docusate 8.6-50 mg per tablet 1 tablet BID    sodium chloride 0.9% flush 10 mL PRN    sodium phosphate 20.01 mmol in dextrose 5 % 250 mL IVPB PRN    sodium phosphate 30 mmol in dextrose 5 % 250 mL IVPB PRN    sodium phosphate 39.99 mmol in dextrose 5 % 250 mL IVPB PRN     sulfamethoxazole-trimethoprim 800-160mg per tablet 1 tablet BID    tiZANidine tablet 4 mg Nightly PRN    valproate (DEPACON) 250 mg in dextrose 5 % 50 mL IVPB Q8H    vasopressin (PITRESSIN) 0.2 Units/mL in dextrose 5 % 100 mL infusion Continuous     Facility-Administered Medications Ordered in Other Encounters   Medication Frequency    ceFAZolin injection 2 g On Call Procedure    lidocaine (PF) 10 mg/ml (1%) injection 10 mg Once    mupirocin 2 % ointment On Call Procedure    sodium chloride 0.9% flush 10 mL PRN       Objective:     Vital Signs (Most Recent):  Temp: 99.1 °F (37.3 °C) (08/18/19 1500)  Pulse: 65 (08/18/19 1717)  Resp: 18 (08/18/19 0514)  BP: 91/63 (08/18/19 1400)  SpO2: 95 % (08/18/19 1717)  O2 Device (Oxygen Therapy): ventilator (08/18/19 1636) Vital Signs (24h Range):  Temp:  [97.5 °F (36.4 °C)-100.1 °F (37.8 °C)] 99.1 °F (37.3 °C)  Pulse:  [61-75] 65  Resp:  [18] 18  SpO2:  [88 %-100 %] 95 %  BP: (74-99)/(55-72) 91/63  Arterial Line BP: ()/(43-76) 103/68     Weight: 130.2 kg (287 lb) (08/17/19 0500)  Body mass index is 43.64 kg/m².  Body surface area is 2.5 meters squared.    I/O last 3 completed shifts:  In: 07964 [I.V.:70153; NG/GT:1100]  Out: 62191 [Other:00563]    Physical Exam   Constitutional: She appears ill. No distress. She is sedated, intubated and restrained.   HENT:   Head: Normocephalic and atraumatic.   Right Ear: External ear normal.   Left Ear: External ear normal.   Eyes: Pupils are equal, round, and reactive to light. Conjunctivae and EOM are normal. Right eye exhibits no discharge. Left eye exhibits no discharge. No scleral icterus.   Neck: Neck supple.   trialysis catheter in place. Central line in place   Cardiovascular: Normal rate, normal heart sounds and intact distal pulses.   No murmur heard.  Pulses:       Radial pulses are 2+ on the right side, and 2+ on the left side.   Pulmonary/Chest: No stridor. She is intubated. No respiratory distress. She has wheezes.    Abdominal: Soft. There is no tenderness.   Femoral A-line in place   Musculoskeletal: She exhibits no tenderness.   Neurological: She is alert.   Follows basic commands.   Skin: Skin is warm and dry. She is not diaphoretic. No erythema.   Nursing note and vitals reviewed.      Significant Labs:  CBC:   Recent Labs   Lab 08/18/19  0409   WBC 21.97*   RBC 2.94*   HGB 7.7*   HCT 26.8*   *   MCV 91   MCH 26.2*   MCHC 28.7*     CMP:   Recent Labs   Lab 08/18/19  0409 08/18/19  1355   *  170* 144*   CALCIUM 8.1*  8.1* 7.9*   ALBUMIN 2.1*  2.1* 2.1*   PROT 6.0  --      137 138   K 4.6  4.6 4.4   CO2 22*  22* 24     105 106   BUN 6  6 4*   CREATININE 0.6  0.6 0.5   ALKPHOS 115  --    ALT 16  --    AST 17  --    BILITOT 0.8  --      All labs within the past 24 hours have been reviewed.     Significant Imaging:  Labs: Reviewed    Assessment/Plan:     ESRD on hemodialysis  Outpatient HD unit: [facility]   -Nephrologist: Patient does not know  -HD tx days: TThS  -HD tx time: 4hrs 45mins    -HD access: LUE AVG  -HD modality: iHD  -Residual urine: Minimal  -EDW: 109 kg     Plan:  - on SLED, will continue for volume management   - Target -400 ml/hr as tolerated by BP, keep MAP >65. Titrate drips as indicated.   - Recommend repeating CXR  - off pressors currently   - Continue on mechanical ventilation with FiO at 35%  - Pt on heparin infusion, no need for citrate   - Continue to monitor intake and output, daily weights   - Avoid nephrotoxic medication and renal dose medications to GFR  - Will follow closely        Thank you for your consult. I will follow-up with patient. Please contact us if you have any additional questions.    Evan Leigh MD  Nephrology  [facility]    ATTENDING PHYSICIAN ATTESTATION  I have personally assessed and examined the patient. I thoroughly reviewed the demographic, clinical, laboratorial and imaging information available in  medical records. I agree with the assessment and recommendations provided by the subspecialty resident. Dr. Mora was under my supervision.     DIALYSIS NOTE  Patient evaluated while undergoing Slow Low Efficiency Dialysis (SLED) indicated for ESRD and hemodynamic instability. No complications, tolerating session with current UFR. Will continue current support.

## 2019-08-18 NOTE — ASSESSMENT & PLAN NOTE
Course lung sounds and CXR concerning for PNA in LLL with interstitial fullness with leukocytosis without fevers. Respiratory cultures positive. WBC slight uptrend w/ episode of hypothermia yesterday.   - ETT culture from 8/4 + MSSA, PSA and moraxella   - continue bactrim for PJP prophylaxis w/ steroid use   - respiratory cx positive for ESBL klebsiella oxytoca - on meropenem   - AFB, PJP negative   - Blood cultures no growth to date   - continue to trend trend WBC (stable from yesterday 24>23); no fever/hypothermia   - ID signed off

## 2019-08-18 NOTE — ASSESSMENT & PLAN NOTE
- continue CRRT for goal of net neutral I/O, vasopressor requirement increased  - follow nephrology recommendation

## 2019-08-18 NOTE — ASSESSMENT & PLAN NOTE
- Respiratory failure due to a combination of pulmonary edema secondary to CHF and ESRD and recent pneumonia.  - Remains intubated since admission, attempted extubated on 8/10, but re-intubated for increased work of breathing and tachypnea  - Continue meropenem for klebsiella infection for 14d  - Continue bactrim for PJP prophylaxis  - Antibiotics per ID recs  - Continue CRRT for net negative volume loss

## 2019-08-18 NOTE — ASSESSMENT & PLAN NOTE
Attempted to discuss goals of care with family on 8/10. Patient's adult children would not like to have this discussion at this time.    On 8/15, pt's son spoke with Dr. Le regarding GOC. He was unsure about making any decisions at this point. Will continue to revisit.    On 8/18, Dr. Le spoke again with pt's son. No concrete decsisions were made, however he seemed believe that his mother would not want to be on long-term dialysis and/or ventilator support (I.e. Not interested in trach). He wants to wait until 8/22-23 to make a concrete decision on what to do regarding changes in care). Consulting palliative care so that family can better understand pt's prognosis and be informed to make decisions regarding future care.

## 2019-08-18 NOTE — PLAN OF CARE
Problem: Adult Inpatient Plan of Care  Goal: Plan of Care Review  Outcome: Ongoing (interventions implemented as appropriate)  Patient on 35% and 5 of PEEP, all VSS through shift, see previous note for more details on acute event. Family updated with POC, plan to have care conference or discussion via sister's requests, all numbers and details in chart. See flow sheet for drips, CRRT at UF of 300, per MD orders, as tolerated by patient. All questions answered by RN, will continue to monitor.

## 2019-08-18 NOTE — SUBJECTIVE & OBJECTIVE
Interval History:  Patient seen and examined at bedside, no acute events overnight. Will reorder SLED, labs reviewed.     Review of patient's allergies indicates:   Allergen Reactions    Bactrim [sulfamethoxazole-trimethoprim] Other (See Comments)     Causes renal failure    Nsaids (non-steroidal anti-inflammatory drug) Other (See Comments)     Renal failure     Current Facility-Administered Medications   Medication Frequency    0.9%  NaCl infusion (CRRT USE ONLY) Continuous    0.9%  NaCl infusion (CRRT USE ONLY) Continuous    acetaminophen tablet 650 mg Q4H PRN    albuterol-ipratropium 2.5 mg-0.5 mg/3 mL nebulizer solution 3 mL Q6H PRN    chlorhexidine 0.12 % solution 15 mL BID    cinacalcet tablet 30 mg Daily with breakfast    clopidogrel tablet 75 mg Daily    dexmedetomidine (PRECEDEX) 400mcg/100mL 0.9% NaCL infusion Continuous    dextrose 10% (D10W) Bolus PRN    dextrose 10% (D10W) Bolus PRN    dextrose 50% injection 25 g PRN    epoetin lily-epbx injection 5,500 Units Every Mon, Wed, Fri    famotidine tablet 20 mg Daily    fentaNYL 2500 mcg in 0.9% sodium chloride 250 mL infusion premix (titrating) Continuous    fentaNYL injection 25 mcg Q1H PRN    fludrocortisone tablet 100 mcg Daily    glucagon (human recombinant) injection 1 mg PRN    glucose chewable tablet 16 g PRN    glucose chewable tablet 24 g PRN    heparin 25,000 units in dextrose 5% (100 units/ml) IV bolus from bag - ADDITIONAL PRN BOLUS - 30 units/kg PRN    heparin 25,000 units in dextrose 5% (100 units/ml) IV bolus from bag - ADDITIONAL PRN BOLUS - 60 units/kg PRN    heparin 25,000 units in dextrose 5% 250 mL (100 units/mL) infusion LOW INTENSITY nomogram - [facility] Continuous    hydrocortisone sodium succinate injection 100 mg Q8H    insulin regular (Humulin R) 100 Units in sodium chloride 0.9% 100 mL infusion Continuous    levETIRAcetam in NaCl (iso-os) IVPB 500 mg Q12H    LORazepam tablet 0.5 mg Q8H PRN    magnesium  sulfate 2g in water 50mL IVPB (premix) PRN    meropenem (MERREM) 2 g in sodium chloride 0.9% 100 mL IVPB Q8H    miconazole NITRATE 2 % top powder BID    midodrine tablet 10 mg TID    norepinephrine 8 mg in dextrose 5 % 250 mL infusion Continuous    ondansetron disintegrating tablet 8 mg Q8H PRN    polyethylene glycol packet 17 g BID    prednisoLONE acetate 1 % ophthalmic suspension 1 drop TID    propofol (DIPRIVAN) 10 mg/mL infusion Continuous    senna-docusate 8.6-50 mg per tablet 1 tablet BID    sodium chloride 0.9% flush 10 mL PRN    sodium phosphate 20.01 mmol in dextrose 5 % 250 mL IVPB PRN    sodium phosphate 30 mmol in dextrose 5 % 250 mL IVPB PRN    sodium phosphate 39.99 mmol in dextrose 5 % 250 mL IVPB PRN    sulfamethoxazole-trimethoprim 800-160mg per tablet 1 tablet BID    tiZANidine tablet 4 mg Nightly PRN    valproate (DEPACON) 250 mg in dextrose 5 % 50 mL IVPB Q8H    vasopressin (PITRESSIN) 0.2 Units/mL in dextrose 5 % 100 mL infusion Continuous     Facility-Administered Medications Ordered in Other Encounters   Medication Frequency    ceFAZolin injection 2 g On Call Procedure    lidocaine (PF) 10 mg/ml (1%) injection 10 mg Once    mupirocin 2 % ointment On Call Procedure    sodium chloride 0.9% flush 10 mL PRN       Objective:     Vital Signs (Most Recent):  Temp: 99.1 °F (37.3 °C) (08/18/19 1500)  Pulse: 65 (08/18/19 1717)  Resp: 18 (08/18/19 0514)  BP: 91/63 (08/18/19 1400)  SpO2: 95 % (08/18/19 1717)  O2 Device (Oxygen Therapy): ventilator (08/18/19 1636) Vital Signs (24h Range):  Temp:  [97.5 °F (36.4 °C)-100.1 °F (37.8 °C)] 99.1 °F (37.3 °C)  Pulse:  [61-75] 65  Resp:  [18] 18  SpO2:  [88 %-100 %] 95 %  BP: (74-99)/(55-72) 91/63  Arterial Line BP: ()/(43-76) 103/68     Weight: 130.2 kg (287 lb) (08/17/19 0500)  Body mass index is 43.64 kg/m².  Body surface area is 2.5 meters squared.    I/O last 3 completed shifts:  In: 31616 [I.V.:17118; NG/GT:1100]  Out: 62289  [Other:97935]    Physical Exam   Constitutional: She appears ill. No distress. She is sedated, intubated and restrained.   HENT:   Head: Normocephalic and atraumatic.   Right Ear: External ear normal.   Left Ear: External ear normal.   Eyes: Pupils are equal, round, and reactive to light. Conjunctivae and EOM are normal. Right eye exhibits no discharge. Left eye exhibits no discharge. No scleral icterus.   Neck: Neck supple.   trialysis catheter in place. Central line in place   Cardiovascular: Normal rate, normal heart sounds and intact distal pulses.   No murmur heard.  Pulses:       Radial pulses are 2+ on the right side, and 2+ on the left side.   Pulmonary/Chest: No stridor. She is intubated. No respiratory distress. She has wheezes.   Abdominal: Soft. There is no tenderness.   Femoral A-line in place   Musculoskeletal: She exhibits no tenderness.   Neurological: She is alert.   Follows basic commands.   Skin: Skin is warm and dry. She is not diaphoretic. No erythema.   Nursing note and vitals reviewed.      Significant Labs:  CBC:   Recent Labs   Lab 08/18/19  0409   WBC 21.97*   RBC 2.94*   HGB 7.7*   HCT 26.8*   *   MCV 91   MCH 26.2*   MCHC 28.7*     CMP:   Recent Labs   Lab 08/18/19  0409 08/18/19  1355   *  170* 144*   CALCIUM 8.1*  8.1* 7.9*   ALBUMIN 2.1*  2.1* 2.1*   PROT 6.0  --      137 138   K 4.6  4.6 4.4   CO2 22*  22* 24     105 106   BUN 6  6 4*   CREATININE 0.6  0.6 0.5   ALKPHOS 115  --    ALT 16  --    AST 17  --    BILITOT 0.8  --      All labs within the past 24 hours have been reviewed.     Significant Imaging:  Labs: Reviewed

## 2019-08-18 NOTE — PROGRESS NOTES
MDs called to bedside for patient MAP of 52, despite titration of levo, labs sent, ABG ordered. Orders received to concentrate levo to 8/250, decrease UF to 200 and gradually increase to 300 as tolerated.     Tube feeds also D/C'd per MD order due to residuals of 650 mL.

## 2019-08-18 NOTE — ASSESSMENT & PLAN NOTE
On 10 mg midodrine TID at home. Currently on levophed and vaso 0.4 based on a-line. On fentanyl, precedex ggt.   - Continue midodrine  - levophed/vaso - titrate to map 65   - Pressor support greatly increased over last 24hr  - Pt has been able to maintain BP for CRRT w/ increased pressor support  - Wean pressor support if tolerated

## 2019-08-18 NOTE — ASSESSMENT & PLAN NOTE
- Evidence of PJP ppx on MAR  - Stress dose during septic shock since 8/5; no change in treatment plan today  - Bactrim for PJP prophylaxis

## 2019-08-19 NOTE — SUBJECTIVE & OBJECTIVE
Interval History/Significant Events: No acute events overnight. Decreasing pressor requirements.     Review of Systems   Unable to perform ROS: Intubated     Objective:     Vital Signs (Most Recent):  Temp: 98.6 °F (37 °C) (08/19/19 1100)  Pulse: 74 (08/19/19 1315)  Resp: 18 (08/19/19 0434)  BP: (!) 94/59 (08/19/19 1200)  SpO2: 100 % (08/19/19 1315) Vital Signs (24h Range):  Temp:  [97.7 °F (36.5 °C)-99.1 °F (37.3 °C)] 98.6 °F (37 °C)  Pulse:  [63-96] 74  Resp:  [18] 18  SpO2:  [91 %-100 %] 100 %  BP: ()/(51-74) 94/59  Arterial Line BP: ()/(44-74) 101/52   Weight: 125.6 kg (277 lb)  Body mass index is 42.12 kg/m².      Intake/Output Summary (Last 24 hours) at 8/19/2019 1348  Last data filed at 8/19/2019 1200  Gross per 24 hour   Intake 7962 ml   Output 7653 ml   Net 309 ml       Physical Exam   Constitutional: She appears ill. No distress. She is sedated, intubated and restrained.   HENT:   Head: Normocephalic and atraumatic.   Eyes: Pupils are equal, round, and reactive to light. Conjunctivae and EOM are normal. Right eye exhibits no discharge. Left eye exhibits no discharge.   Neck: Normal range of motion. Neck supple.   trialysis catheter in place. Central line in place   Cardiovascular: Normal rate, normal heart sounds and intact distal pulses.   No murmur heard.  Pulses:       Radial pulses are 2+ on the right side, and 2+ on the left side.   Pulmonary/Chest: Effort normal. No stridor. She is intubated. No respiratory distress. She has no wheezes. She has rales.   Abdominal: Soft. There is no tenderness.   Femoral A-line in place   Musculoskeletal: She exhibits no tenderness.   Neurological: She is alert.   Follows basic commands.   Skin: Skin is warm and dry. She is not diaphoretic. No erythema.   Nursing note and vitals reviewed.      Vents:  Vent Mode: A/C (08/19/19 1315)  Ventilator Initiated: Yes (08/10/19 1742)  Set Rate: 15 bmp (08/19/19 1315)  Vt Set: 400 mL (08/19/19 1315)  Pressure  Support: 10 cmH20 (08/12/19 0645)  PEEP/CPAP: 5 cmH20 (08/19/19 1315)  Oxygen Concentration (%): 35 (08/19/19 1315)  Peak Airway Pressure: 32 cmH2O (08/19/19 1315)  Plateau Pressure: 25 cmH20 (08/19/19 1315)  Total Ve: 12.3 mL (08/19/19 1315)  F/VT Ratio<105 (RSBI): (!) 46.88 (08/19/19 0434)  Lines/Drains/Airways     Central Venous Catheter Line                 Percutaneous Central Line Insertion/Assessment - triple lumen  08/04/19 1803 left internal jugular 14 days         Hemodialysis Catheter 08/13/19 0400 right internal jugular 6 days          Drain                 Hemodialysis AV Graft Left upper arm -- days         NG/OG Tube 08/11/19 1600 Center mouth 7 days          Airway                 Airway - Non-Surgical 08/10/19 1756 Endotracheal Tube 8 days          Arterial Line                 Arterial Line 08/13/19 1700 Right Femoral 5 days          Peripheral Intravenous Line                 Peripheral IV - Single Lumen 08/16/19 0614 20 G Posterior;Right Hand 3 days              Significant Labs:    CBC/Anemia Profile:  Recent Labs   Lab 08/18/19  0409 08/19/19  0417   WBC 21.97* 18.99*   HGB 7.7* 7.4*   HCT 26.8* 26.9*   * 147*   MCV 91 94   RDW 24.8* 25.0*        Chemistries:  Recent Labs   Lab 08/18/19  0409  08/18/19  2208 08/19/19  0417 08/19/19  1200     137   < > 139  139 138  138  138 140   K 4.6  4.6   < > 4.4  4.4 4.4  4.4  4.4 4.5     105   < > 108  108 105  105  105 107   CO2 22*  22*   < > 23  23 24  24  24 21*   BUN 6  6   < > 4*  4* 3*  3*  3* 3*   CREATININE 0.6  0.6   < > 0.5  0.5 0.5  0.5  0.5 0.5   CALCIUM 8.1*  8.1*   < > 7.8*  7.8* 8.0*  8.0*  8.0* 8.0*   ALBUMIN 2.1*  2.1*   < > 2.0*  2.0* 2.2*  2.2*  2.2* 2.2*   PROT 6.0  --   --  6.0  --    BILITOT 0.8  --   --  1.0  --    ALKPHOS 115  --   --  105  --    ALT 16  --   --  15  --    AST 17  --   --  19  --    MG 1.9   < > 1.9  1.9 1.9  1.9 2.2   PHOS 2.4*  2.4*   < > 1.1*  1.1* 3.7   3.7  3.7 2.2*    < > = values in this interval not displayed.       ABGs:   Recent Labs   Lab 08/19/19  0446   PH 7.461*   PCO2 24.6*   HCO3 17.5*   POCSATURATED 99   BE -6       Significant Imaging:  I have reviewed all pertinent imaging results/findings within the past 24 hours.

## 2019-08-19 NOTE — PROGRESS NOTES
Crrt  restarte , good blood flow. [id number] /  , uf net restatred at 300 . Vital stable  B/P 103/53  Pulse 74 . Patient  Remains on vent  And mild sedation .

## 2019-08-19 NOTE — ASSESSMENT & PLAN NOTE
Palliative Care Encounter / Goals of care discussion:      Narrative:   Sisi Medel is a 58 y.o. female patient with hypoxic respiratory failure secondary to sarcoidosis and CHF exacerbation on ventilator, polymicrobial sputum cultures, requiring vasopressors and ESRD on CRRT. Critical Care team has communicated findings with the son who is well informed of his mother's current medical condition. Palliative Care consulted for further discussion of goals of care.     Introduced patient's son to palliative care team and offered support. He has great insight into current medical condition and has had discussion with his mother regarding goals of care. Another son not present today and mother is designated POA per 2014 documentation. Will discuss this with family tomorrow.     1: Pain / Physical symptom Assessment:   - pain assesment: No obvious pain   - dyspnea assessment: Intubated, appears comfortable   - anxiety assessment: Appears calm, sedated with precedex   - depression assessment: unable to assess    2: Social Background    2 sons, not    Mother is still living   Former section manager at [employer]    3: Palliative care meeting participants:    Son    4: Goals of care Discussion details:  Patient has previously expressed that she would not want to be on prolonged life support. Son is trying to speak in his mother's interest. He has been very involved in her care for many years going back to his childhood when she first developed medical problems. He had previously discussed with critical care service that a time limited trial would be undertaken and changes to management would be reassessed at the end of this week on Thursday or Friday. He has another brother who just moved back into town, has not been involved in family matters for an extended time.     5: Goals of care Decisions / Recommendations / Plan:  Continue current management for now  Time limited trial of ventilation, pressors, CRRT.    Will be reassessed daily and at the end of the week    6: Follow up plans:    I will follow up with the patient for support and at the end of the week for questions regarding changes in management.     Thank you for your consult. I will follow along with you. Please call [phone] with questions.

## 2019-08-19 NOTE — ASSESSMENT & PLAN NOTE
On 10 mg midodrine TID at home. Currently on levophed and vaso 0.4 based on a-line. On fentanyl, precedex ggt.   - Continue midodrine  - levophed/vaso - titrate to map 65  - Pt has been able to maintain BP for CRRT w/ increased pressor support  - Wean pressor support if tolerated

## 2019-08-19 NOTE — PROGRESS NOTES
[facility]  Nephrology  Progress Note    Patient Name: Sisi Medel  MRN: [id number]  Admission Date: 8/3/2019  Hospital Length of Stay: 16 days  Attending Provider: Edward Le*   Primary Care Physician: Carlos A Caputo MD  Principal Problem:Acute on chronic respiratory failure with hypoxia and hypercapnia      Interval History: Pt seen on SLED this morning. Net negative 183ml for the past 24hrs. Pt remains sedated and ventilated, on Fi02 of 35%. Still requiring levo and vaso to maintain adequate BP. She nods/gestures appropriately and is following commands.     Review of patient's allergies indicates:   Allergen Reactions    Bactrim [sulfamethoxazole-trimethoprim] Other (See Comments)     Causes renal failure    Nsaids (non-steroidal anti-inflammatory drug) Other (See Comments)     Renal failure     Current Facility-Administered Medications   Medication Frequency    0.9%  NaCl infusion (CRRT USE ONLY) Continuous    0.9%  NaCl infusion (CRRT USE ONLY) Continuous    acetaminophen tablet 650 mg Q4H PRN    albuterol-ipratropium 2.5 mg-0.5 mg/3 mL nebulizer solution 3 mL Q6H PRN    chlorhexidine 0.12 % solution 15 mL BID    cinacalcet tablet 30 mg Daily with breakfast    clopidogrel tablet 75 mg Daily    dexmedetomidine (PRECEDEX) 400mcg/100mL 0.9% NaCL infusion Continuous    dextrose 10% (D10W) Bolus PRN    dextrose 10% (D10W) Bolus PRN    dextrose 50% injection 25 g PRN    epoetin lily-epbx injection 5,500 Units Every Mon, Wed, Fri    famotidine tablet 20 mg Daily    fentaNYL 2500 mcg in 0.9% sodium chloride 250 mL infusion premix (titrating) Continuous    fentaNYL injection 25 mcg Q1H PRN    fludrocortisone tablet 100 mcg Daily    glucagon (human recombinant) injection 1 mg PRN    glucose chewable tablet 16 g PRN    glucose chewable tablet 24 g PRN    heparin 25,000 units in dextrose 5% (100 units/ml) IV bolus from bag - ADDITIONAL PRN BOLUS - 30  units/kg PRN    heparin 25,000 units in dextrose 5% (100 units/ml) IV bolus from bag - ADDITIONAL PRN BOLUS - 60 units/kg PRN    heparin 25,000 units in dextrose 5% 250 mL (100 units/mL) infusion LOW INTENSITY nomogram - [facility] Continuous    hydrocortisone sodium succinate injection 100 mg Q8H    insulin regular (Humulin R) 100 Units in sodium chloride 0.9% 100 mL infusion Continuous    levETIRAcetam in NaCl (iso-os) IVPB 500 mg Q12H    LORazepam tablet 0.5 mg Q8H PRN    magnesium sulfate 2g in water 50mL IVPB (premix) PRN    meropenem (MERREM) 2 g in sodium chloride 0.9% 100 mL IVPB Q8H    miconazole NITRATE 2 % top powder BID    midodrine tablet 10 mg TID    norepinephrine 8 mg in dextrose 5 % 250 mL infusion Continuous    ondansetron disintegrating tablet 8 mg Q8H PRN    polyethylene glycol packet 17 g BID    prednisoLONE acetate 1 % ophthalmic suspension 1 drop TID    propofol (DIPRIVAN) 10 mg/mL infusion Continuous    senna-docusate 8.6-50 mg per tablet 1 tablet BID    sodium chloride 0.9% flush 10 mL PRN    sodium phosphate 20.01 mmol in dextrose 5 % 250 mL IVPB PRN    sodium phosphate 30 mmol in dextrose 5 % 250 mL IVPB PRN    sodium phosphate 39.99 mmol in dextrose 5 % 250 mL IVPB PRN    sulfamethoxazole-trimethoprim 800-160mg per tablet 1 tablet BID    tiZANidine tablet 4 mg Nightly PRN    valproate (DEPACON) 250 mg in dextrose 5 % 50 mL IVPB Q8H    vasopressin (PITRESSIN) 0.2 Units/mL in dextrose 5 % 100 mL infusion Continuous     Facility-Administered Medications Ordered in Other Encounters   Medication Frequency    ceFAZolin injection 2 g On Call Procedure    lidocaine (PF) 10 mg/ml (1%) injection 10 mg Once    mupirocin 2 % ointment On Call Procedure    sodium chloride 0.9% flush 10 mL PRN       Objective:     Vital Signs (Most Recent):  Temp: 97.8 °F (36.6 °C) (08/19/19 0700)  Pulse: 69 (08/19/19 0910)  Resp: 18 (08/19/19 0434)  BP: (!) 81/54 (08/19/19 0900)  SpO2: 100 %  (08/19/19 0910)  O2 Device (Oxygen Therapy): ventilator (08/19/19 0910) Vital Signs (24h Range):  Temp:  [97.7 °F (36.5 °C)-99.1 °F (37.3 °C)] 97.8 °F (36.6 °C)  Pulse:  [63-96] 69  Resp:  [18] 18  SpO2:  [88 %-100 %] 100 %  BP: ()/(51-74) 81/54  Arterial Line BP: ()/(44-74) 95/50     Weight: 125.6 kg (277 lb) (08/19/19 0400)  Body mass index is 42.12 kg/m².  Body surface area is 2.45 meters squared.    I/O last 3 completed shifts:  In: 04151 [I.V.:24093; NG/GT:550]  Out: 46159 [Other:73649]    Physical Exam   Constitutional: She appears well-developed and well-nourished. No distress.   HENT:   Head: Normocephalic and atraumatic.   Eyes: Pupils are equal, round, and reactive to light. Conjunctivae and EOM are normal.   Neck: Normal range of motion. Neck supple.   Cardiovascular: Normal rate, regular rhythm and normal heart sounds.   Pulmonary/Chest: Effort normal. No stridor. No respiratory distress. She has no wheezes. She has rales.   Abdominal: Soft. Bowel sounds are normal.   Musculoskeletal: Normal range of motion. She exhibits edema (1+ pitting edema to BLE). She exhibits no tenderness or deformity.   Neurological:   Pt intubated/sedated   Skin: Skin is warm and dry. She is not diaphoretic.   Psychiatric:   Pt intubated/sedated. Does follow commands        Significant Labs:  ABGs:   Recent Labs   Lab 08/19/19 0446   PH 7.461*   PCO2 24.6*   HCO3 17.5*   POCSATURATED 99   BE -6     CBC:   Recent Labs   Lab 08/19/19 0417   WBC 18.99*   RBC 2.85*   HGB 7.4*   HCT 26.9*   *   MCV 94   MCH 26.0*   MCHC 27.5*     CMP:   Recent Labs   Lab 08/19/19 0417   *  142*  142*   CALCIUM 8.0*  8.0*  8.0*   ALBUMIN 2.2*  2.2*  2.2*   PROT 6.0     138  138   K 4.4  4.4  4.4   CO2 24  24  24     105  105   BUN 3*  3*  3*   CREATININE 0.5  0.5  0.5   ALKPHOS 105   ALT 15   AST 19   BILITOT 1.0     All labs within the past 24 hours have been reviewed.          Assessment/Plan:     ESRD on hemodialysis  Outpatient HD unit: [facility]   -Nephrologist: Patient does not know  -HD tx days: TThS  -HD tx time: 4hrs 45mins    -HD access: LUE AVG  -HD modality: iHD  -Residual urine: Minimal  -EDW: 109 kg     Plan:  - On SLED, will continue for volume management   - Target -400 ml/hr as tolerated by BP, keep MAP >65. Titrate drips as indicated.   - Pt on heparin infusion, no need for citrate   - Continue to monitor intake and output, daily weights   - Avoid nephrotoxic medication and renal dose medications to GFR  - Will follow closely        Thank you for your consult. I will follow-up with patient. Please contact us if you have any additional questions.    Dinh Rodriguez NP  Nephrology  [facility]

## 2019-08-19 NOTE — ASSESSMENT & PLAN NOTE
Patient has hx of HF (EF 35%). CXR in the ED suggestive of HF. BNP 1424 and troponins 0.156.  Adherence to home medication regimen is unclear.  - Repeat TTE EF 25% on 8/11, LVH, G3DD, LAE/JAGUAR, moderate RV systolic dysfunction   - Continue CRRT  - Pressor requirements are decreasing.   - Add dobutamine 2.5 mcg/kg/min

## 2019-08-19 NOTE — HPI
"58 F with ESRD, pulmonary sarcoidosis and HFrEF was admitted for SOB at dialysis. Initially treated for volume overload with dialysis but respiratory status worsened requiring ICU admission. Further worsened and required intubation. Developed septic shock and has required prolonged vasopressors, mechanical ventilation. Palliative Care is consulted for goals of care discussion with family.     Son at bedside provides history. Patient has been dealing with chronic disease for many years. He describes "multiple angles" and is able to provide a timeline of ESRD, DM2, sarcoid, CHF over decades since his childhood. He has had discussions regarding end of life care in the past with patient who has related that she would not want long term life support measures. Her health has declined significantly in the preceding months and acute exacerbation requiring hospitalization was not unexpected for them. He reports being designated decision maker by his mother, although he was in the process of getting appropriate documentation and has not been made medical decision maker.   "

## 2019-08-19 NOTE — SUBJECTIVE & OBJECTIVE
Interval History: Pt seen on SLED this morning. Net positive 100ml for the past 24hrs. Pt remains sedated and ventilated, on Fi02 of 35%. Still requiring levo and vaso to maintain adequate BP. She nods/gestures appropriately and is following commands.     Review of patient's allergies indicates:   Allergen Reactions    Bactrim [sulfamethoxazole-trimethoprim] Other (See Comments)     Causes renal failure    Nsaids (non-steroidal anti-inflammatory drug) Other (See Comments)     Renal failure     Current Facility-Administered Medications   Medication Frequency    0.9%  NaCl infusion (CRRT USE ONLY) Continuous    0.9%  NaCl infusion (CRRT USE ONLY) Continuous    acetaminophen tablet 650 mg Q4H PRN    albuterol-ipratropium 2.5 mg-0.5 mg/3 mL nebulizer solution 3 mL Q6H PRN    chlorhexidine 0.12 % solution 15 mL BID    cinacalcet tablet 30 mg Daily with breakfast    clopidogrel tablet 75 mg Daily    dexmedetomidine (PRECEDEX) 400mcg/100mL 0.9% NaCL infusion Continuous    dextrose 10% (D10W) Bolus PRN    dextrose 10% (D10W) Bolus PRN    dextrose 50% injection 25 g PRN    epoetin lily-epbx injection 5,500 Units Every Mon, Wed, Fri    famotidine tablet 20 mg Daily    fentaNYL 2500 mcg in 0.9% sodium chloride 250 mL infusion premix (titrating) Continuous    fentaNYL injection 25 mcg Q1H PRN    fludrocortisone tablet 100 mcg Daily    glucagon (human recombinant) injection 1 mg PRN    glucose chewable tablet 16 g PRN    glucose chewable tablet 24 g PRN    heparin 25,000 units in dextrose 5% (100 units/ml) IV bolus from bag - ADDITIONAL PRN BOLUS - 30 units/kg PRN    heparin 25,000 units in dextrose 5% (100 units/ml) IV bolus from bag - ADDITIONAL PRN BOLUS - 60 units/kg PRN    heparin 25,000 units in dextrose 5% 250 mL (100 units/mL) infusion LOW INTENSITY nomogram - [facility] Continuous    hydrocortisone sodium succinate injection 100 mg Q8H    insulin regular (Humulin R) 100 Units in sodium chloride 0.9%  100 mL infusion Continuous    levETIRAcetam in NaCl (iso-os) IVPB 500 mg Q12H    LORazepam tablet 0.5 mg Q8H PRN    magnesium sulfate 2g in water 50mL IVPB (premix) PRN    meropenem (MERREM) 2 g in sodium chloride 0.9% 100 mL IVPB Q8H    miconazole NITRATE 2 % top powder BID    midodrine tablet 10 mg TID    norepinephrine 8 mg in dextrose 5 % 250 mL infusion Continuous    ondansetron disintegrating tablet 8 mg Q8H PRN    polyethylene glycol packet 17 g BID    prednisoLONE acetate 1 % ophthalmic suspension 1 drop TID    propofol (DIPRIVAN) 10 mg/mL infusion Continuous    senna-docusate 8.6-50 mg per tablet 1 tablet BID    sodium chloride 0.9% flush 10 mL PRN    sodium phosphate 20.01 mmol in dextrose 5 % 250 mL IVPB PRN    sodium phosphate 30 mmol in dextrose 5 % 250 mL IVPB PRN    sodium phosphate 39.99 mmol in dextrose 5 % 250 mL IVPB PRN    sulfamethoxazole-trimethoprim 800-160mg per tablet 1 tablet BID    tiZANidine tablet 4 mg Nightly PRN    valproate (DEPACON) 250 mg in dextrose 5 % 50 mL IVPB Q8H    vasopressin (PITRESSIN) 0.2 Units/mL in dextrose 5 % 100 mL infusion Continuous     Facility-Administered Medications Ordered in Other Encounters   Medication Frequency    ceFAZolin injection 2 g On Call Procedure    lidocaine (PF) 10 mg/ml (1%) injection 10 mg Once    mupirocin 2 % ointment On Call Procedure    sodium chloride 0.9% flush 10 mL PRN       Objective:     Vital Signs (Most Recent):  Temp: 97.8 °F (36.6 °C) (08/19/19 0700)  Pulse: 69 (08/19/19 0910)  Resp: 18 (08/19/19 0434)  BP: (!) 81/54 (08/19/19 0900)  SpO2: 100 % (08/19/19 0910)  O2 Device (Oxygen Therapy): ventilator (08/19/19 0910) Vital Signs (24h Range):  Temp:  [97.7 °F (36.5 °C)-99.1 °F (37.3 °C)] 97.8 °F (36.6 °C)  Pulse:  [63-96] 69  Resp:  [18] 18  SpO2:  [88 %-100 %] 100 %  BP: ()/(51-74) 81/54  Arterial Line BP: ()/(44-74) 95/50     Weight: 125.6 kg (277 lb) (08/19/19 0400)  Body mass index is 42.12  kg/m².  Body surface area is 2.45 meters squared.    I/O last 3 completed shifts:  In: 54890 [I.V.:80663; NG/GT:550]  Out: 95514 [Other:38340]    Physical Exam   Constitutional: She appears well-developed and well-nourished. No distress.   HENT:   Head: Normocephalic and atraumatic.   Eyes: Pupils are equal, round, and reactive to light. Conjunctivae and EOM are normal.   Neck: Normal range of motion. Neck supple.   Cardiovascular: Normal rate, regular rhythm and normal heart sounds.   Pulmonary/Chest: Effort normal. No stridor. No respiratory distress. She has no wheezes. She has rales.   Abdominal: Soft. Bowel sounds are normal.   Musculoskeletal: Normal range of motion. She exhibits edema (1+ pitting edema to BLE). She exhibits no tenderness or deformity.   Neurological:   Pt intubated/sedated   Skin: Skin is warm and dry. She is not diaphoretic.   Psychiatric:   Pt intubated/sedated. Does follow commands        Significant Labs:  ABGs:   Recent Labs   Lab 08/19/19  0446   PH 7.461*   PCO2 24.6*   HCO3 17.5*   POCSATURATED 99   BE -6     CBC:   Recent Labs   Lab 08/19/19  0417   WBC 18.99*   RBC 2.85*   HGB 7.4*   HCT 26.9*   *   MCV 94   MCH 26.0*   MCHC 27.5*     CMP:   Recent Labs   Lab 08/19/19  0417   *  142*  142*   CALCIUM 8.0*  8.0*  8.0*   ALBUMIN 2.2*  2.2*  2.2*   PROT 6.0     138  138   K 4.4  4.4  4.4   CO2 24  24  24     105  105   BUN 3*  3*  3*   CREATININE 0.5  0.5  0.5   ALKPHOS 105   ALT 15   AST 19   BILITOT 1.0     All labs within the past 24 hours have been reviewed.

## 2019-08-19 NOTE — ASSESSMENT & PLAN NOTE
Outpatient HD unit: [facility]   -Nephrologist: Patient does not know  -HD tx days: TThS  -HD tx time: 4hrs 45mins    -HD access: LUE AVG  -HD modality: iHD  -Residual urine: Minimal  -EDW: 109 kg     Plan:  - On SLED, will continue for volume management   - Target -400 ml/hr as tolerated by BP, keep MAP >65. Titrate drips as indicated.   - Pt on heparin infusion, no need for citrate   - Continue to monitor intake and output, daily weights   - Avoid nephrotoxic medication and renal dose medications to GFR  - Will follow closely

## 2019-08-19 NOTE — PLAN OF CARE
"Problem: Adult Inpatient Plan of Care  Goal: Plan of Care Review  Outcome: Ongoing (interventions implemented as appropriate)  Dx: Acute on chronic respiratory failure with hypoxia and hypercapnia    Shift Events: Precedex off    Neuro: Sedated, Arouses to Voice, Follows Commands and Moves All Extremities    Vital Signs: BP 94/63 (BP Location: Right arm, Patient Position: Lying)   Pulse 70   Temp 98.5 °F (36.9 °C) (Oral)   Resp 18   Ht 5' 8" (1.727 m)   Wt 125.6 kg (277 lb)   LMP  (LMP Unknown)   SpO2 100%   Breastfeeding? No   BMI 42.12 kg/m²     Diet: NPO and Tube Feeds    Gtts: Propfol, Fentanyl, Insulin, Norepinephrine, Vasopressin, Heparin and MIVF    Urine Output: Anuric 0 cc/shift    Drains: None    CRRT UF rate 350    Restraints No injuries noted.  Need for restraints reassesed.  ROM performed.        Labs/Accuchecks: Labs monitored and replaced as needed.  Accuchecks Q1H.     Skin: See flowsheets.  Sacral and heel foams in place.  Heels elevated off of bed.  Repositioned frequently.  Sacral breakdown and wound noted.          "

## 2019-08-19 NOTE — PROGRESS NOTES
"[facility]  Adult Nutrition  Progress Note    SUMMARY       Recommendations  Recommendation/Intervention:   1. Current TF does not meet needs, recommend modifying to Peptamen Intense VHP at a goal rate of 55 mL/hr - meets needs.   2. When able to extubate, ADAT to Renal with texture per SLP.   RD to monitor.    Goals: Patient to meet > 85% EEN and EPN by RD follow-up  Nutrition Goal Status: goal not met  Communication of RD Recs: (POC)    Reason for Assessment  Reason For Assessment: RD follow-up  Diagnosis: (respiratory failure)  Relevant Medical History: CHF, DM2, ESRD on HD, seizures  General Information Comments: Still intubated, sedated. On CRRT. TF at 30 mL/hr, had been held 8/17 due to his residual but now restarted and increasing to goal of 40. Physical exam remains unchanged, continues to appear nourished.  Nutrition Discharge Planning: Unable to determine at this time.    Nutrition Risk Screen  Nutrition Risk Screen: tube feeding or parenteral nutrition    Nutrition/Diet History  Spiritual, Cultural Beliefs, Gnosticism Practices, Values that Affect Care: no  Factors Affecting Nutritional Intake: NPO, on mechanical ventilation    Anthropometrics  Temp: 97.8 °F (36.6 °C)  Height Method: Stated  Height: 5' 8" (172.7 cm)  Height (inches): 68 in  Weight Method: Bed Scale  Weight: 125.6 kg (277 lb)  Weight (lb): 277 lb  Ideal Body Weight (IBW), Female: 140 lb  % Ideal Body Weight, Female (lb): 172.86 lb  BMI (Calculated): 36.9  BMI Grade: 35 - 39.9 - obesity - grade II    Lab/Procedures/Meds  Pertinent Labs Reviewed: reviewed  Pertinent Labs Comments: Glu 142, POCT Glu 148-185, HgbA1c 6.1, Ca 8.0, Alb 2.2  Pertinent Medications Reviewed: reviewed  Pertinent Medications Comments: famotidine, senna-docusate, precedex, fentnayl, insulin drip, levophed, propofol, vasopressin    Estimated/Assessed Needs  Weight Used For Calorie Calculations: 110 kg (242 lb 8.1 oz)  Energy Calorie Requirements " (kcal): 1540 kcal/day  Energy Need Method: Kcal/kg(14)  Protein Requirements: 118 g/day(2.0 g/kg)  Weight Used For Protein Calculations: 59.1 kg (130 lb 4.7 oz)(IBW)  Fluid Requirements (mL): 1 mL/kcal or per MD  Estimated Fluid Requirement Method: RDA Method  RDA Method (mL): 1540  CHO Requirement: 50% total kcal    Nutrition Prescription Ordered  Current Diet Order: NPO  Current Nutrition Support Formula Ordered: Novasource Renal  Current Nutrition Support Rate Ordered: 40 (ml)  Current Nutrition Support Frequency Ordered: mL/hr    Evaluation of Received Nutrient/Fluid Intake  Enteral Calories (kcal): 1920  Enteral Protein (gm): 87  Enteral (Free Water) Fluid (mL): 688  Other Calories (kcal): 341(propofol)  Total Calories (kcal): 2261  % Kcal Needs: 147%  % Protein Needs: 74%  I/O: -4.1L since admit  Energy Calories Required: exceeds needs  Protein Required: not meeting needs  Fluid Required: (per MD)  Comments: LBM 8/19  Tolerance: tolerating(at 30 ml/hr)  % Intake of Estimated Energy Needs: Other: > 100%  % Meal Intake: NPO    Nutrition Risk  Level of Risk/Frequency of Follow-up: low(1x/week)     Assessment and Plan  Nutrition Problem  Inadequate energy intake     Related to (etiology):   Decreased ability to consume sufficient energy     Signs and Symptoms (as evidenced by):   NPO with and TF providing < 85% EEN and EPN     Interventions/Recommendations (treatment strategy):  Collaboration of nutrition care with other providers     Nutrition Diagnosis Status:   Improving - advancing to goal rate    Monitor and Evaluation  Food and Nutrient Intake: energy intake, enteral nutrition intake  Food and Nutrient Adminstration: enteral and parenteral nutrition administration  Anthropometric Measurements: weight, weight change  Biochemical Data, Medical Tests and Procedures: electrolyte and renal panel, gastrointestinal profile, glucose/endocrine profile, inflammatory profile  Nutrition-Focused Physical Findings:  overall appearance     Nutrition Follow-Up  RD Follow-up?: Yes

## 2019-08-19 NOTE — CONSULTS
[facility]  Palliative Medicine  Consult Note    Patient Name: Sisi Medel  MRN: [id number]  Admission Date: 8/3/2019  Hospital Length of Stay: 16 days  Code Status: Full Code   Attending Provider: Flakita Soto MD  Consulting Provider: Jeremy Gonzalez MD  Primary Care Physician: Carlos A Caputo MD  Principal Problem:Acute on chronic respiratory failure with hypoxia and hypercapnia    Patient information was obtained from relative(s) and past medical records.      Consults  Assessment/Plan:     Palliative care encounter  Palliative Care Encounter / Goals of care discussion:      Narrative:   Sisi Medel is a 58 y.o. female patient with hypoxic respiratory failure secondary to sarcoidosis and CHF exacerbation on ventilator, polymicrobial sputum cultures, requiring vasopressors and ESRD on CRRT. Critical Care team has communicated findings with the son who is well informed of his mother's current medical condition. Palliative Care consulted for further discussion of goals of care.     Introduced patient's son to palliative care team and offered support. He has great insight into current medical condition and has had discussion with his mother regarding goals of care. Another son not present today and mother is designated POA per 2014 documentation. Will discuss this with family tomorrow.     1: Pain / Physical symptom Assessment:   - pain assesment: No obvious pain   - dyspnea assessment: Intubated, appears comfortable   - anxiety assessment: Appears calm, sedated with precedex   - depression assessment: unable to assess    2: Social Background    2 sons, not    Mother is still living   Former section manager at [employer]    3: Palliative care meeting participants:    Son    4: Goals of care Discussion details:  Patient has previously expressed that she would not want to be on prolonged life support. Son is trying to speak in his mother's interest. He has been very  "involved in her care for many years going back to his childhood when she first developed medical problems. He had previously discussed with critical care service that a time limited trial would be undertaken and changes to management would be reassessed at the end of this week on Thursday or Friday. He has another brother who just moved back into town, has not been involved in family matters for an extended time.     5: Goals of care Decisions / Recommendations / Plan:  Continue current management for now  Time limited trial of ventilation, pressors, CRRT.   Will be reassessed daily and at the end of the week    6: Follow up plans:    I will follow up with the patient for support and at the end of the week for questions regarding changes in management.     Thank you for your consult. I will follow along with you. Please call [phone] with questions.           Thank you for your consult. I will follow-up with patient. Please contact us if you have any additional questions.    Subjective:     HPI:   58 F with ESRD, pulmonary sarcoidosis and HFrEF was admitted for SOB at dialysis. Initially treated for volume overload with dialysis but respiratory status worsened requiring ICU admission. Further worsened and required intubation. Developed septic shock and has required prolonged vasopressors, mechanical ventilation. Palliative Care is consulted for goals of care discussion with family.     Son at bedside provides history. Patient has been dealing with chronic disease for many years. He describes "multiple angles" and is able to provide a timeline of ESRD, DM2, sarcoid, CHF over decades since his childhood. He has had discussions regarding end of life care in the past with patient who has related that she would not want long term life support measures. Her health has declined significantly in the preceding months and acute exacerbation requiring hospitalization was not unexpected for them. He reports being " designated decision maker by his mother, although he was in the process of getting appropriate documentation and has not been made medical decision maker.     Hospital Course:  No notes on file      Past Medical History:   Diagnosis Date    Anemia in ESRD (end-stage renal disease) 3/7/2016    Anticoagulant long-term use     Cervical radiculopathy 7/20/2015    CHF (congestive heart failure)     Chronic combined systolic and diastolic heart failure 6/14/2013    EF 20% 2013, improved with Medical therapy, negative PET 2013    Chronic respiratory failure with hypoxia 04/22/2013    On home oxygen at 2-5 liters    Closed fracture of proximal end of right fibula 6/27/2016    Complications due to renal dialysis device, implant, and graft     DDD (degenerative disc disease), lumbar 6/17/2015    Dependence on renal dialysis     Diabetes mellitus type II, controlled 12/8/2017    ESRD on hemodialysis 2/23/2016    Essential hypertension     Gout     Lateral meniscal tear 5/31/2016    Lumbar stenosis 6/17/2015    Obesity, Class III, BMI 40-49.9 (morbid obesity)     Pacemaker     Paroxysmal atrial fibrillation 5/16/2014    Not on anticoagulation    Peripheral neuropathy 11/13/2013    Personal history of gastric ulcer 3/19/2013    Pneumonia of left lower lobe due to infectious organism 3/10/2019    Sarcoidosis, lung 2009    Diagnosed in 2009 with ocular manifestation, on 4L home O2 and PO steroids    Secondary pulmonary hypertension 4/7/2015    Seizure disorder 3/19/2013    Shingles 11/13/2013    Thyroid disease        Past Surgical History:   Procedure Laterality Date    ABDOMINAL SURGERY      ABLATION N/A 6/12/2017    Per[facility] CATH LAB    ANGIOPLASTY Left 1/10/2018    Performed by Torrey Villafana MD at [facility] OR [facility] FLR    ANGIOPLASTY-PERCUTANEOUS TRANSLUMINAL (PTA) Left 2/7/2017    Performed by [facility] at [facility] FLR     ANGIOPLASTY-PERCUTANEOUS TRANSLUMINAL (PTA) Left 10/12/2016    Performed by [facility] at [facility] OR 2ND FLR    CARDIAC PACEMAKER PLACEMENT       SECTION      x2    DECLOT GRAFT PERCUTANEOUS Left 2017    Performed by [facility] MD at [facility] OR [facility] FLR    DECLOT-GRAFT Left 2016    Performed by Harjit Starr MD at [facility] CATH LAB    DECLOT-GRAFT Left 2016    Performed by [facility]it Starr MD at [facility] CATH LAB    ECHOCARDIOGRAM-TRANSESOPHAGEAL N/A 2013    Performed by Delmy Surgeon at [facility] DELMY    Fistulogram Left 2019    Performed by [facility]ana MD at [facility] CATH LAB    Fistulogram Left 2019    Performed by Tor[facility] Vill[facility]ana MD at [facility] CATH LAB    fistulogram Left 1/10/2018    Performed by [facility]ana MD at [facility] OR [facility] FLR    FISTULOGRAM Left 2017    Performed by Tor[facility] Vill[facility]ana MD at [facility] CATH LAB    FISTULOGRAM Left 10/12/2016    Performed by [facility] Vill[facility]ana MD at [facility] OR [facility] FLR    FISTULOGRAM Left 2016    Performed by Harjit Starr MD at [facility] CATH LAB    Fistulogram, LUE AVG, possible intervention Left 2019    Performed by [facility]ana MD at [facility] CATH LAB    Fistulogram, OR 11 Left 2019    Performed by Torrey Villafana MD at [facility] OR [facility] FLR    INJECTION-STEROID-EPIDURAL-TRANSFORAMINAL Right 2015    Performed by Patria Gutierrez MD at [facility] PAIN MGT    INJECTION-STEROID-EPIDURAL-TRANSFORAMINAL Right 2015    Performed by Patria [facility]rez MD at [facility] PAIN MGT    YHRJSNDFO-KZKNC-AEVFTLLOIOABA / Left upper AV graft. Left 2/3/2016    Performed by [facility] Villafana MD at [facility] OR [facility] FLR    LQKVTFLAZ-PORGBKHJQ-UMELRQMXKRUMS N/A 2017    Performed by Bladimir Adorno MD at [facility] CATH LAB    OTHER SURGICAL HISTORY      loop recorder implant    PLACEMENT-STENT Left 2017    Performed by Torrey Villafana MD at [facility] OR [facility] FLR    PTA, AV FISTULA N/A 2019    Performed by  [facility] MD at [facility] CATH LAB    PTA, AV FISTULA N/A 1/30/2019    Performed by Tor[facility]afana MD at [facility] CATH LAB    stent to fistula      Stent, Fistula  7/24/2019    Performed by [facility]ana MD at [facility] CATH LAB    TRANSESOPHAGEAL ECHOCARDIOGRAM (JARAD) N/A 6/27/2016    Performed by Sourav Machuca MD at [facility] CATH LAB    ULTRASOUND, UPPER GI TRACT, ENDOSCOPIC N/A 1/9/2014    Performed by Stewart Burgess MD at [facility] ENDO (2ND FLR)    VASCULAR SURGERY      fistula to L upper arm        Review of patient's allergies indicates:   Allergen Reactions    Bactrim [sulfamethoxazole-trimethoprim] Other (See Comments)     Causes renal failure    Nsaids (non-steroidal anti-inflammatory drug) Other (See Comments)     Renal failure       Medications:  Continuous Infusions:   sodium chloride 0.9% 200 mL/hr at 08/19/19 1700    dexmedetomidine (PRECEDEX) infusion 1.4 mcg/kg/hr (08/19/19 1800)    DOBUTamine Stopped (08/19/19 1430)    fentanyl 10 mL/hr at 08/19/19 1700    heparin (porcine) in D5W 13 Units/kg/hr (08/19/19 1700)    norepinephrine bitartrate-D5W 0.1 mcg/kg/min (08/19/19 1700)    propofol Stopped (08/19/19 0800)     Scheduled Meds:   chlorhexidine  15 mL Mouth/Throat BID    cinacalcet  30 mg Oral Daily with breakfast    clopidogrel  75 mg Oral Daily    epoetin lily-ebpx (RETACRIT) injection  50 Units/kg Intravenous Every Mon, Wed, Fri    famotidine  20 mg Per OG tube Daily    fludrocortisone  100 mcg Oral Daily    hydrocortisone sodium succinate  100 mg Intravenous Q8H    levetiracetam IVPB  500 mg Intravenous Q12H    meropenem (MERREM) IVPB  2 g Intravenous Q8H    miconazole NITRATE 2 %   Topical (Top) BID    midodrine  10 mg Oral TID    polyethylene glycol  17 g Oral BID    prednisoLONE acetate  1 drop Left Eye TID    senna-docusate 8.6-50 mg  1 tablet Oral BID    sulfamethoxazole-trimethoprim 800-160mg  1 tablet Per G Tube BID    valproate sodium (DEPACON)  IVPB  250 mg Intravenous Q8H     PRN Meds:acetaminophen, albuterol-ipratropium, Dextrose 10% Bolus, Dextrose 10% Bolus, dextrose 50%, [] fentaNYL **FOLLOWED BY** fentaNYL, glucagon (human recombinant), glucose, glucose, heparin (PORCINE), heparin (PORCINE), insulin aspart U-100, LORazepam, magnesium sulfate IVPB, ondansetron, sodium chloride 0.9%, sodium phosphate IVPB, sodium phosphate IVPB, sodium phosphate IVPB, tiZANidine    Family History     Problem Relation (Age of Onset)    Coronary artery disease Father    Diabetes Mother, Father, Maternal Grandmother    Hypertension Mother, Father    Kidney failure Mother    Lupus Sister        Tobacco Use    Smoking status: Former Smoker     Packs/day: 0.50     Years: 10.00     Pack years: 5.00     Types: Cigarettes     Last attempt to quit: 1994     Years since quittin.3    Smokeless tobacco: Never Used   Substance and Sexual Activity    Alcohol use: No     Alcohol/week: 0.0 oz     Comment: rarely    Drug use: No    Sexual activity: Not Currently       Review of Systems   Unable to perform ROS: Intubated     Objective:     Vital Signs (Most Recent):  Temp: 98.6 °F (37 °C) (19 1100)  Pulse: 76 (19 1700)  Resp: 18 (19 0434)  BP: (!) 92/54 (19 1500)  SpO2: 100 % (19 1700) Vital Signs (24h Range):  Temp:  [97.7 °F (36.5 °C)-98.6 °F (37 °C)] 98.6 °F (37 °C)  Pulse:  [63-96] 76  Resp:  [18] 18  SpO2:  [91 %-100 %] 100 %  BP: ()/(50-74) 92/54  Arterial Line BP: ()/(44-74) 100/51     Weight: 125.6 kg (277 lb)  Body mass index is 42.12 kg/m².    Review of Symptoms  Symptom Assessment (ESAS 0-10 scale)   ESAS 0 1 2 3 4 5 6 7 8 9 10   Pain      x        Dyspnea x             Anxiety x             Nausea x             Depression  x             Anorexia x             Fatigue x             Insomnia x             Restlessness  x             Agitation x               Bowel Management Plan (BMP): Yes    Comments: Senna  docusate     Pain Assessment: No obvious pain    OME in 24 hours: 720 (continuous fentanyl infusion)    Performance Status: 40    ECOG Performance Status Grade: 3 - Confined to bed or chair 50% of waking hours    Physical Exam   Constitutional: She appears ill. No distress. She is sedated, intubated and restrained.   HENT:   Head: Normocephalic and atraumatic.   Eyes: Pupils are equal, round, and reactive to light. Conjunctivae and EOM are normal. Right eye exhibits no discharge. Left eye exhibits no discharge.   Neck: Normal range of motion. Neck supple.   Central line in place   Cardiovascular: Normal rate and regular rhythm.   Pulses:       Radial pulses are 2+ on the right side, and 2+ on the left side.   Pulmonary/Chest: She is intubated.   Mechanically ventilated, comfortable appearing   Abdominal: Soft. There is no tenderness.   Musculoskeletal: She exhibits edema. She exhibits no tenderness.   Neurological: She is alert.   Awake and interactive   Skin: Skin is warm and dry. She is not diaphoretic. No erythema.   Nursing note and vitals reviewed.      Significant Labs:   All pertinent labs within the past 24 hours have been reviewed.  Recent Lab Results       08/19/19  1200   08/19/19  1152   08/19/19  0727   08/19/19  0623   08/19/19  0507        Albumin 2.2             Alkaline Phosphatase               Allens Test               ALT               Anion Gap 12             Aniso               aPTT               AST               Baso #               Basophil%               BILIRUBIN TOTAL               Site               BUN, Bld 3             Calcium 8.0             Chloride 107             CO2 21             Creatinine 0.5             DelSys               Differential Method               eGFR if  >60.0             eGFR if non  >60.0  Comment:  Calculation used to obtain the estimated glomerular filtration  rate (eGFR) is the CKD-EPI equation.                Eos #                Eosinophil%               FiO2               Glucose 148             Gran # (ANC)               Gran%               Hematocrit               Hemoglobin               Hypo               Immature Grans (Abs)               Immature Granulocytes               Coumadin Monitoring INR               Lymph #               Lymph%               Magnesium 2.2             MCH               MCHC               MCV               Mode               Mono #               Mono%               MPV               nRBC               Ovalocytes               PEEP               Phosphorus 2.2             Platelet Estimate               Platelets               POC BE               POC HCO3               POC PCO2               POC PH               POC PO2               POC SATURATED O2               POC TCO2               POCT Glucose   151 151 185 160     Poik               Poly               Potassium 4.5             PROTEIN TOTAL               Protime               Rate               RBC               RDW               Sample               Sodium 140             Target Cells               Tear Drop Cells               Vt               WBC                                08/19/19  0446   08/19/19  0417   08/19/19  0404   08/19/19  0324   08/19/19  0243        Albumin   2.2              2.2              2.2           Alkaline Phosphatase   105           Allens Test N/A             ALT   15           Anion Gap   9              9              9           Aniso   Moderate           aPTT   54.4  Comment:  aPTT therapeutic range = 39-69 seconds           AST   19           Baso #   0.01           Basophil%   0.1           BILIRUBIN TOTAL   1.0  Comment:  For infants and newborns, interpretation of results should be based  on gestational age, weight and in agreement with clinical  observations.  Premature Infant recommended reference ranges:  Up to 24 hours.............<8.0 mg/dL  Up to 48 hours............<12.0 mg/dL  3-5  days..................<15.0 mg/dL  6-29 days.................<15.0 mg/dL             Site [place]             BUN, Bld   3              3              3           Calcium   8.0              8.0              8.0           Chloride   105              105              105           CO2   24              24              24           Creatinine   0.5              0.5              0.5           [facility] Adult Vent             Differential Method   Automated           eGFR if    >60.0              >60.0              >60.0           eGFR if non    >60.0  Comment:  Calculation used to obtain the estimated glomerular filtration  rate (eGFR) is the CKD-EPI equation.                 >60.0  Comment:  Calculation used to obtain the estimated glomerular filtration  rate (eGFR) is the CKD-EPI equation.                 >60.0  Comment:  Calculation used to obtain the estimated glomerular filtration  rate (eGFR) is the CKD-EPI equation.              Eos #   0.0           Eosinophil%   0.0           FiO2 35             Glucose   142              142              142           Gran # (ANC)   16.6           Gran%   87.3           Hematocrit   26.9           Hemoglobin   7.4           Hypo   Occasional           Immature Grans (Abs)   0.24  Comment:  Mild elevation in immature granulocytes is non specific and   can be seen in a variety of conditions including stress response,   acute inflammation, trauma and pregnancy. Correlation with other   laboratory and clinical findings is essential.             Immature Granulocytes   1.3           Coumadin Monitoring INR   1.1  Comment:  Coumadin Therapy:  2.0 - 3.0 for INR for all indicators except mechanical heart valves  and antiphospholipid syndromes which should use 2.5 - 3.5.             Lymph #   0.7           Lymph%   3.8           Magnesium   1.9              1.9           MCH   26.0           MCHC   27.5           MCV   94           Mode AC/PRVC              Mono #   1.4           Mono%   7.5           MPV   13.0           nRBC   1           Ovalocytes   Occasional           PEEP 5             Phosphorus   3.7              3.7              3.7           Platelet Estimate   Decreased           Platelets   147           POC BE -6             POC HCO3 17.5             POC PCO2 24.6             POC PH 7.461             POC PO2 150             POC SATURATED O2 99             POC TCO2 18             POCT Glucose     160 148 137     Poik   Slight           Poly   Occasional           Potassium   4.4              4.4              4.4           PROTEIN TOTAL   6.0           Protime   11.4           Rate 18             RBC   2.85           RDW   25.0           Sample ARTERIAL             Sodium   138              138              138           Target Cells   Occasional           Tear Drop Cells   Occasional           Vt 400             WBC   18.99                            08/19/19  0133   08/19/19  0006   08/18/19  2326   08/18/19  2213   08/18/19  2208        Albumin         2.0              2.0     Alkaline Phosphatase               Allens Test               ALT               Anion Gap         8              8     Aniso               aPTT               AST               Baso #               Basophil%               BILIRUBIN TOTAL               Site               BUN, Bld         4              4     Calcium         7.8              7.8     Chloride         108              108     CO2         23              23     Creatinine         0.5              0.5     DelSys               Differential Method               eGFR if          >60.0              >60.0     eGFR if non          >60.0  Comment:  Calculation used to obtain the estimated glomerular filtration  rate (eGFR) is the CKD-EPI equation.                 >60.0  Comment:  Calculation used to obtain the estimated glomerular filtration  rate (eGFR) is the CKD-EPI equation.        Eos #                Eosinophil%               FiO2               Glucose         164              164     Gran # (ANC)               Gran%               Hematocrit               Hemoglobin               Hypo               Immature Grans (Abs)               Immature Granulocytes               Coumadin Monitoring INR               Lymph #               Lymph%               Magnesium         1.9              1.9     MCH               MCHC               MCV               Mode               Mono #               Mono%               MPV               nRBC               Ovalocytes               PEEP               Phosphorus         1.1              1.1     Platelet Estimate               Platelets               POC BE               POC HCO3               POC PCO2               POC PH               POC PO2               POC SATURATED O2               POC TCO2               POCT Glucose 147 140 188 167       Poik               Poly               Potassium         4.4              4.4     PROTEIN TOTAL               Protime               Rate               RBC               RDW               Sample               Sodium         139              139     Target Cells               Tear Drop Cells               Vt               WBC                                08/18/19  2110   08/18/19  2015   08/18/19  1931   08/18/19  1923   08/18/19  1806        Albumin               Alkaline Phosphatase               Allens Test               ALT               Anion Gap               Aniso               aPTT               AST               Baso #               Basophil%               BILIRUBIN TOTAL               Site               BUN, Bld               Calcium               Chloride               CO2               Creatinine               DelSys               Differential Method               eGFR if                eGFR if non                Eos #               Eosinophil%               FiO2               Glucose                Gran # (ANC)               Gran%               Hematocrit               Hemoglobin               Hypo               Immature Grans (Abs)               Immature Granulocytes               Coumadin Monitoring INR               Lymph #               Lymph%               Magnesium               MCH               MCHC               MCV               Mode               Mono #               Mono%               MPV               nRBC               Ovalocytes               PEEP               Phosphorus               Platelet Estimate               Platelets               POC BE               POC HCO3               POC PCO2               POC PH               POC PO2               POC SATURATED O2               POC TCO2               POCT Glucose 165 159 165 154 126     Poik               Poly               Potassium               PROTEIN TOTAL               Protime               Rate               RBC               RDW               Sample               Sodium               Target Cells               Tear Drop Cells               Vt               WBC                                  CBC:   Recent Labs   Lab 08/19/19  0417   WBC 18.99*   HGB 7.4*   HCT 26.9*   MCV 94   *     BMP:  Recent Labs   Lab 08/19/19  1200   *      K 4.5      CO2 21*   BUN 3*   CREATININE 0.5   CALCIUM 8.0*   MG 2.2     LFT:  Lab Results   Component Value Date    AST 19 08/19/2019    GGT 54 10/24/2018    ALKPHOS 105 08/19/2019    BILITOT 1.0 08/19/2019     Albumin:   Albumin   Date Value Ref Range Status   08/19/2019 2.2 (L) 3.5 - 5.2 g/dL Final     Protein:   Total Protein   Date Value Ref Range Status   08/19/2019 6.0 6.0 - 8.4 g/dL Final     Lactic acid:   Lab Results   Component Value Date    LACTATE 3.9 (HH) 08/10/2019    LACTATE 1.2 08/09/2019       Significant Imaging: CXR: I have reviewed all pertinent results/findings within the past 24 hours:  Hilar prominence    Advance Care Planning   Advanced  Directives::  Living Will: Yes. Copy on chart: Yes  LaPOST: No  Do Not Resuscitate Status: No  Medical Power of : Yes. Agent's Name: Maikel Tompkins. Agent's Contact Number: [phone]    Decision-Making Capacity: Family answered questions       Living Arrangements: Lives alone    Psychosocial/Cultural:  Patient's most important priorities:  Quality of life     Patient's biggest concerns/fears:  Not discussed    Previous death/end of life care history:  Per chart review (not discussed in interview today) was on hospice in 2014 for CHF.     Patient's goals/hopes:  Not discussed    Spiritual: Yes     F- Fely and Belief: Latter-day    I - Importance: Yes   .  C - Community: Have been in contact with her     A - Address in Care: Yes      > 50% of 70 min visit spent in chart review, face to face discussion of goals of care,  symptom assessment, coordination of care and emotional support.    Jeremy Gonzalez MD  Palliative Medicine  [facility]

## 2019-08-19 NOTE — PROGRESS NOTES
[facility]  Critical Care Medicine  Progress Note    Patient Name: Sisi Medel  MRN: [id number]  Admission Date: 8/3/2019  Hospital Length of Stay: 16 days  Code Status: Full Code  Attending Provider: Edward Le*  Primary Care Provider: Carlos A Caputo MD   Principal Problem: Acute on chronic respiratory failure with hypoxia and hypercapnia    Subjective:     HPI:  Ms Medel  59 yo f with ESRD on HD, cervical radiculopathy, CHF, Chronic resp failure with hypoxia, T2DM, DDD, paroxysmal Afib, seizure, shingles, thyroid disorder here for a 3 day history of cough and SOB. Patient was at dialysis and had to stop at 1.6L due to resp distress. She was put in a nonrebreather at 12L of oxygen. She was transported via EMS. Overnight she required BiPAP for worsening SOB. Rapid response was called this morning because she was hypotensive 70s/50s. Dr. Rosas, MICU team, went and recheck BP was 107/59. She later became hypertensive again and continued to be on BiPAP, she was stepped up for dialysis, BP monitoring, possible vasopressor requirement and further respiratory monitoring.     During step up she was persistently hypoglycemic and hypotensive, peripheral vascular access was lost and a left TLC was placed at bedside. It was determined she could not tolerated her normal HD and she had tenuous respiratory status, so decision was made to intubate. She has been started on broad spectrum abx, and right HD IJ catheter was placed. Nephro been made aware and SCUF planned for today.     Hospital/ICU Course:  Admitted on 08/03 for what appeared to be heart failure exacerbation. Care escalated to ICU due to her tenuous respirashe was intubated and started on broad spectrum abx. She was unable to tolerate her HD so patient has been on CRRT. In evaluating her AMS, CTH was unremarkable and EEG was consistent with encephalopathy. Patient had worsening leukocytosis and BAL on 8/9 was positive for  klebsiella. She is currently on meropenem. Due to chronic steroid use for her sarcoidosis, she is also on bactrim for PJP prophylaxis.  Frequent SAT results in significant tachypnea and agitation. On 8/10 extubation was attempted but failed so was re-intubated for increased work of breathing and tachypnea.   Pressor requirements have decreased however they still need to be raised when pt becomes more alert. On 8/16 pt had several hours during which temp fell to 94, but had resolved by the morning. On 8/18 pt pressor requirements increased. Dr. Le spoke with son regarding goals of care, in which he voiced that his mother wouldn't want to be on long term ventilator and dialysis support.    Interval History/Significant Events: No acute events overnight. Decreasing pressor requirements.     Review of Systems   Unable to perform ROS: Intubated     Objective:     Vital Signs (Most Recent):  Temp: 98.6 °F (37 °C) (08/19/19 1100)  Pulse: 74 (08/19/19 1315)  Resp: 18 (08/19/19 0434)  BP: (!) 94/59 (08/19/19 1200)  SpO2: 100 % (08/19/19 1315) Vital Signs (24h Range):  Temp:  [97.7 °F (36.5 °C)-99.1 °F (37.3 °C)] 98.6 °F (37 °C)  Pulse:  [63-96] 74  Resp:  [18] 18  SpO2:  [91 %-100 %] 100 %  BP: ()/(51-74) 94/59  Arterial Line BP: ()/(44-74) 101/52   Weight: 125.6 kg (277 lb)  Body mass index is 42.12 kg/m².      Intake/Output Summary (Last 24 hours) at 8/19/2019 1348  Last data filed at 8/19/2019 1200  Gross per 24 hour   Intake 7962 ml   Output 7653 ml   Net 309 ml       Physical Exam   Constitutional: She appears ill. No distress. She is sedated, intubated and restrained.   HENT:   Head: Normocephalic and atraumatic.   Eyes: Pupils are equal, round, and reactive to light. Conjunctivae and EOM are normal. Right eye exhibits no discharge. Left eye exhibits no discharge.   Neck: Normal range of motion. Neck supple.   trialysis catheter in place. Central line in place   Cardiovascular: Normal rate, normal  heart sounds and intact distal pulses.   No murmur heard.  Pulses:       Radial pulses are 2+ on the right side, and 2+ on the left side.   Pulmonary/Chest: Effort normal. No stridor. She is intubated. No respiratory distress. She has no wheezes. She has rales.   Abdominal: Soft. There is no tenderness.   Femoral A-line in place   Musculoskeletal: She exhibits no tenderness.   Neurological: She is alert.   Follows basic commands.   Skin: Skin is warm and dry. She is not diaphoretic. No erythema.   Nursing note and vitals reviewed.      Vents:  Vent Mode: A/C (08/19/19 1315)  Ventilator Initiated: Yes (08/10/19 1742)  Set Rate: 15 bmp (08/19/19 1315)  Vt Set: 400 mL (08/19/19 1315)  Pressure Support: 10 cmH20 (08/12/19 0645)  PEEP/CPAP: 5 cmH20 (08/19/19 1315)  Oxygen Concentration (%): 35 (08/19/19 1315)  Peak Airway Pressure: 32 cmH2O (08/19/19 1315)  Plateau Pressure: 25 cmH20 (08/19/19 1315)  Total Ve: 12.3 mL (08/19/19 1315)  F/VT Ratio<105 (RSBI): (!) 46.88 (08/19/19 0434)  Lines/Drains/Airways     Central Venous Catheter Line                 Percutaneous Central Line Insertion/Assessment - triple lumen  08/04/19 1803 left internal jugular 14 days         Hemodialysis Catheter 08/13/19 0400 right internal jugular 6 days          Drain                 Hemodialysis AV Graft Left upper arm -- days         NG/OG Tube 08/11/19 1600 Center mouth 7 days          Airway                 Airway - Non-Surgical 08/10/19 1756 Endotracheal Tube 8 days          Arterial Line                 Arterial Line 08/13/19 1700 Right Femoral 5 days          Peripheral Intravenous Line                 Peripheral IV - Single Lumen 08/16/19 0614 20 G Posterior;Right Hand 3 days              Significant Labs:    CBC/Anemia Profile:  Recent Labs   Lab 08/18/19  0409 08/19/19  0417   WBC 21.97* 18.99*   HGB 7.7* 7.4*   HCT 26.8* 26.9*   * 147*   MCV 91 94   RDW 24.8* 25.0*        Chemistries:  Recent Labs   Lab 08/18/19  4028   08/18/19  2208 08/19/19  0417 08/19/19  1200     137   < > 139  139 138  138  138 140   K 4.6  4.6   < > 4.4  4.4 4.4  4.4  4.4 4.5     105   < > 108  108 105  105  105 107   CO2 22*  22*   < > 23  23 24  24  24 21*   BUN 6  6   < > 4*  4* 3*  3*  3* 3*   CREATININE 0.6  0.6   < > 0.5  0.5 0.5  0.5  0.5 0.5   CALCIUM 8.1*  8.1*   < > 7.8*  7.8* 8.0*  8.0*  8.0* 8.0*   ALBUMIN 2.1*  2.1*   < > 2.0*  2.0* 2.2*  2.2*  2.2* 2.2*   PROT 6.0  --   --  6.0  --    BILITOT 0.8  --   --  1.0  --    ALKPHOS 115  --   --  105  --    ALT 16  --   --  15  --    AST 17  --   --  19  --    MG 1.9   < > 1.9  1.9 1.9  1.9 2.2   PHOS 2.4*  2.4*   < > 1.1*  1.1* 3.7  3.7  3.7 2.2*    < > = values in this interval not displayed.       ABGs:   Recent Labs   Lab 08/19/19  0446   PH 7.461*   PCO2 24.6*   HCO3 17.5*   POCSATURATED 99   BE -6       Significant Imaging:  I have reviewed all pertinent imaging results/findings within the past 24 hours.      ABG  Recent Labs   Lab 08/19/19  0446   PH 7.461*   PO2 150*   PCO2 24.6*   HCO3 17.5*   BE -6     Assessment/Plan:     Neuro  Encephalopathy  Mental status stable. Currently on precedex and fentanyl for sedation  - CTH unremarkable, EEG 8/7 showed encephalopathy    Epilepsy  Continue keppra. EEG showed encephalopathy.     Pulmonary  * Acute on chronic respiratory failure with hypoxia and hypercapnia  - Respiratory failure due to a combination of pulmonary edema secondary to CHF and ESRD and recent pneumonia.  - Remains intubated since admission, attempted extubated on 8/10, but re-intubated for increased work of breathing and tachypnea  - Continue meropenem for klebsiella infection for 14d  - Continue bactrim for PJP prophylaxis  - Antibiotics per ID recs  - Continue CRRT for net negative volume loss        Pneumonia  Course lung sounds and CXR concerning for PNA in LLL with interstitial fullness with leukocytosis without fevers. Respiratory  cultures positive. WBC slight uptrend w/ episode of hypothermia yesterday.   - ETT culture from 8/4 + MSSA, PSA and moraxella   - continue bactrim for PJP prophylaxis w/ steroid use   - respiratory cx positive for ESBL klebsiella oxytoca - on meropenem   - AFB, PJP negative   - Blood cultures no growth to date   - continue to trend trend WBC (stable from yesterday 24>23); no fever/hypothermia   - ID signed off      Pulmonary hypertension  - Likely Group 2 pHTN related to HFrEF. Continue on dialysis   - TTE (8/6/2019): PASP of 50        Cardiac/Vascular  Permanent atrial fibrillation  Patient presents with A fib in the ED. Patient is on chronic warfarin. S/P ablation multiple times.     -Continue heparin gtt  -Pacemaker in place       Acute on chronic systolic congestive heart failure  Patient has hx of HF (EF 35%). CXR in the ED suggestive of HF. BNP 1424 and troponins 0.156.  Adherence to home medication regimen is unclear.  - Repeat TTE EF 25% on 8/11, LVH, G3DD, LAE/JAGUAR, moderate RV systolic dysfunction   - Continue CRRT  - Pressor requirements are decreasing.   - Add dobutamine 2.5 mcg/kg/min    Hypotension  On 10 mg midodrine TID at home. Currently on levophed and vaso 0.4 based on a-line. On fentanyl, precedex ggt.   - Continue midodrine  - levophed/vaso - titrate to map 65  - Pt has been able to maintain BP for CRRT w/ increased pressor support  - Wean pressor support if tolerated      Renal/  ESRD on hemodialysis  - continue CRRT for goal of net neutral I/O, vasopressor requirement increased  - follow nephrology recommendation    Immunology/Multi System  Pulmonary sarcoidosis  - Continue stress dose steroids while in septic shock (since 8/5)  - Started on bactrim per ID with chronic steroid use for PCP prophylaxis    Hematology  Current use of long term anticoagulation  - heparin gtt while IP  - no evidence of hepatic dysfunction on labs - minor bili bump      Endocrine  Type 2 diabetes mellitus, without  long-term current use of insulin  - Home medication: linagliptin.   - Continue ISS   - trend glucose POCT    GI  Constipation  - senna and colace    Other  Class 2 severe obesity due to excess calories with serious comorbidity and body mass index (BMI) of 38.0 to 38.9 in adult  Will need to revisit weight loss when improving    Hypoalbuminemia  Currently on tube feeds.  - Temporarily stopped. Will restart  - On renal novasource     Current chronic use of systemic steroids  - Evidence of PJP ppx on MAR  - Stress dose during septic shock since 8/5; no change in treatment plan today  - Bactrim for PJP prophylaxis    Counseling regarding goals of care  Attempted to discuss goals of care with family on 8/10. Patient's adult children would not like to have this discussion at this time.    On 8/15, pt's son spoke with Dr. Le regarding GOC. He was unsure about making any decisions at this point. Will continue to revisit.    On 8/18, Dr. Le spoke again with pt's son. No concrete decsisions were made, however he seemed believe that his mother would not want to be on long-term dialysis and/or ventilator support (I.e. Not interested in trach). He wants to wait until 8/22-23 to make a concrete decision on what to do regarding changes in care). Consulting palliative care so that family can better understand pt's prognosis and be informed to make decisions regarding future care.       Critical Care Daily Checklist:    A: Awake: RASS Goal/Actual Goal: RASS Goal: 0-->alert and calm  Actual: Horton Agitation Sedation Scale (RASS): Light sedation   B: Spontaneous Breathing Trial Performed? Spon. Breathing Trial Initiated?: Initiated (08/10/19 0800)   C: SAT & SBT Coordinated?  Yes                      D: Delirium: CAM-ICU Overall CAM-ICU: Negative   E: Early Mobility Performed? Yes   F: Feeding Goal: Goals: Patient to meet > 85% EEN and EPN by RD follow-up  Status: Nutrition Goal Status: goal not met   Current Diet  Order   Procedures    Diet NPO Except for: Medication     Order Specific Question:   Except for     Answer:   Medication      AS: Analgesia/Sedation Fentanyl, Precedex   T: Thromboembolic Prophylaxis Heparin gtt   H: HOB > 300 Yes   U: Stress Ulcer Prophylaxis (if needed) Famotidine   G: Glucose Control Insulin sliding scaled   B: Bowel Function Stool Occurrence: 1   I: Indwelling Catheter (Lines & Yepez) Necessity Trialysis (right IJ), TLC (left IJ), OG, A-line (femoral)   D: De-escalation of Antimicrobials/Pharmacotherapies Complete current abx course    Plan for the day/ETD Decrease pressor requirements    Code Status:  Family/Goals of Care: Full Code         Critical secondary to Patient has a condition that poses threat to life and bodily function: Severe Respiratory Distress      Critical care was time spent personally by me on the following activities: development of treatment plan with patient or surrogate and bedside caregivers, discussions with consultants, evaluation of patient's response to treatment, examination of patient, ordering and performing treatments and interventions, ordering and review of laboratory studies, ordering and review of radiographic studies, pulse oximetry, re-evaluation of patient's condition. This critical care time did not overlap with that of any other provider or involve time for any procedures.     Elisabeth Munoz MD  Critical Care Medicine  [facility]

## 2019-08-19 NOTE — SUBJECTIVE & OBJECTIVE
Past Medical History:   Diagnosis Date    Anemia in ESRD (end-stage renal disease) 3/7/2016    Anticoagulant long-term use     Cervical radiculopathy 2015    CHF (congestive heart failure)     Chronic combined systolic and diastolic heart failure 2013    EF 20% , improved with Medical therapy, negative PET     Chronic respiratory failure with hypoxia 2013    On home oxygen at 2-5 liters    Closed fracture of proximal end of right fibula 2016    Complications due to renal dialysis device, implant, and graft     DDD (degenerative disc disease), lumbar 2015    Dependence on renal dialysis     Diabetes mellitus type II, controlled 2017    ESRD on hemodialysis 2016    Essential hypertension     Gout     Lateral meniscal tear 2016    Lumbar stenosis 2015    Obesity, Class III, BMI 40-49.9 (morbid obesity)     Pacemaker     Paroxysmal atrial fibrillation 2014    Not on anticoagulation    Peripheral neuropathy 2013    Personal history of gastric ulcer 3/19/2013    Pneumonia of left lower lobe due to infectious organism 3/10/2019    Sarcoidosis, lung 2009    Diagnosed in  with ocular manifestation, on 4L home O2 and PO steroids    Secondary pulmonary hypertension 2015    Seizure disorder 3/19/2013    Shingles 2013    Thyroid disease        Past Surgical History:   Procedure Laterality Date    ABDOMINAL SURGERY      ABLATION N/A 2017    Performed by Bladimir Adorno MD at [facility] CATH LAB    ANGIOPLASTY Left 1/10/2018    Performed by Torrey Villafana MD at [facility] OR 2ND FLR    ANGIOPLASTY-PERCUTANEOUS TRANSLUMINAL (PTA) Left 2017    Performed by Torrey [facility]afana MD at [facility] OR 2ND FLR    ANGIOPLASTY-PERCUTANEOUS TRANSLUMINAL (PTA) Left 10/12/2016    Performed by Torrey [facility]afana MD at [facility] OR 2ND FLR    CARDIAC PACEMAKER PLACEMENT       SECTION      x2    DECLOT GRAFT PERCUTANEOUS Left  2/7/2017    Performed by [facility] at [facility] OR 2ND FLR    DECLOT-GRAFT Left 6/21/2016    Performed by Harjit Starr MD at [facility] CATH LAB    DECLOT-GRAFT Left 6/20/2016    Performed by [facility]jit Starr MD at [facility] CATH LAB    ECHOCARDIOGRAM-TRANSESOPHAGEAL N/A 6/27/2013    Performed by Delmy Surgeon at [facility] DELMY    Fistulogram Left 7/24/2019    Performed by Torrey [facility]afana MD at [facility] CATH LAB    Fistulogram Left 4/8/2019    Performed by Torrey Villafana MD at [facility] CATH LAB    fistulogram Left 1/10/2018    Performed by Torrey Villafana MD at [facility] OR 2ND FLR    FISTULOGRAM Left 9/13/2017    Performed by Torrey Villafana MD at [facility] CATH LAB    FISTULOGRAM Left 10/12/2016    Performed by Torrey [facility]afana MD at [facility] OR 2ND FLR    FISTULOGRAM Left 6/21/2016    Performed by Harjit Starr MD at [facility] CATH LAB    Fistulogram, LUE AVG, possible intervention Left 1/30/2019    Performed by Torrey Villafana MD at [facility] CATH LAB    Fistulogram, OR 11 Left 5/8/2019    Performed by Torrey Villafana MD at [facility] OR 2ND FLR    INJECTION-STEROID-EPIDURAL-TRANSFORAMINAL Right 7/20/2015    Performed by [facility]rez [facility] at [facility] PAIN MGT    INJECTION-STEROID-EPIDURAL-TRANSFORAMINAL Right 5/21/2015    Performed by Patria [facility]rez MD at [facility] PAIN MGT    BDTPQAMDL-MGYAY-DFIMXLZODWANB / Left upper AV graft. Left 2/3/2016    Performed by Torrey [facility]afana MD at [facility] OR 2ND FLR    QAMNLDKQW-AEATDGQTH-MXOCFEQPYDSUN N/A 1/11/2017    Performed by Bladimir Adorno MD at [facility] CATH LAB    OTHER SURGICAL HISTORY      loop recorder implant    PLACEMENT-STENT Left 2/7/2017    Performed by Torrey [facility]afana MD at [facility] OR 2ND FLR    PTA, AV FISTULA N/A 7/24/2019    Performed by Torrey Villafana MD at [facility] CATH LAB    PTA, AV FISTULA N/A 1/30/2019    Performed by Torrey [facility]afana MD at [facility] CATH LAB    stent to fistula      Stent, Fistula  7/24/2019    Performed by Torrey Villafana,  MD at [facility] CATH LAB    TRANSESOPHAGEAL ECHOCARDIOGRAM (JARAD) N/A 2016    Performed by Sourav Machuca MD at [facility] CATH LAB    ULTRASOUND, UPPER GI TRACT, ENDOSCOPIC N/A 2014    Performed by Stewart Burgess MD at [facility] ENDO (2ND FLR)    VASCULAR SURGERY      fistula to L upper arm        Review of patient's allergies indicates:   Allergen Reactions    Bactrim [sulfamethoxazole-trimethoprim] Other (See Comments)     Causes renal failure    Nsaids (non-steroidal anti-inflammatory drug) Other (See Comments)     Renal failure       Medications:  Continuous Infusions:   sodium chloride 0.9% 200 mL/hr at 19 1700    dexmedetomidine (PRECEDEX) infusion 1.4 mcg/kg/hr (19 1800)    DOBUTamine Stopped (19 1430)    fentanyl 10 mL/hr at 19 1700    heparin (porcine) in D5W 13 Units/kg/hr (19 1700)    norepinephrine bitartrate-D5W 0.1 mcg/kg/min (19 1700)    propofol Stopped (19 0800)     Scheduled Meds:   chlorhexidine  15 mL Mouth/Throat BID    cinacalcet  30 mg Oral Daily with breakfast    clopidogrel  75 mg Oral Daily    epoetin lily-ebpx (RETACRIT) injection  50 Units/kg Intravenous Every Mon, Wed, Fri    famotidine  20 mg Per OG tube Daily    fludrocortisone  100 mcg Oral Daily    hydrocortisone sodium succinate  100 mg Intravenous Q8H    levetiracetam IVPB  500 mg Intravenous Q12H    meropenem (MERREM) IVPB  2 g Intravenous Q8H    miconazole NITRATE 2 %   Topical (Top) BID    midodrine  10 mg Oral TID    polyethylene glycol  17 g Oral BID    prednisoLONE acetate  1 drop Left Eye TID    senna-docusate 8.6-50 mg  1 tablet Oral BID    sulfamethoxazole-trimethoprim 800-160mg  1 tablet Per G Tube BID    valproate sodium (DEPACON) IVPB  250 mg Intravenous Q8H     PRN Meds:acetaminophen, albuterol-ipratropium, Dextrose 10% Bolus, Dextrose 10% Bolus, dextrose 50%, [] fentaNYL **FOLLOWED BY** fentaNYL, glucagon (human recombinant), glucose,  glucose, heparin (PORCINE), heparin (PORCINE), insulin aspart U-100, LORazepam, magnesium sulfate IVPB, ondansetron, sodium chloride 0.9%, sodium phosphate IVPB, sodium phosphate IVPB, sodium phosphate IVPB, tiZANidine    Family History     Problem Relation (Age of Onset)    Coronary artery disease Father    Diabetes Mother, Father, Maternal Grandmother    Hypertension Mother, Father    Kidney failure Mother    Lupus Sister        Tobacco Use    Smoking status: Former Smoker     Packs/day: 0.50     Years: 10.00     Pack years: 5.00     Types: Cigarettes     Last attempt to quit: 1994     Years since quittin.3    Smokeless tobacco: Never Used   Substance and Sexual Activity    Alcohol use: No     Alcohol/week: 0.0 oz     Comment: rarely    Drug use: No    Sexual activity: Not Currently       Review of Systems   Unable to perform ROS: Intubated     Objective:     Vital Signs (Most Recent):  Temp: 98.6 °F (37 °C) (19 1100)  Pulse: 76 (19 1700)  Resp: 18 (19 0434)  BP: (!) 92/54 (19 1500)  SpO2: 100 % (19 1700) Vital Signs (24h Range):  Temp:  [97.7 °F (36.5 °C)-98.6 °F (37 °C)] 98.6 °F (37 °C)  Pulse:  [63-96] 76  Resp:  [18] 18  SpO2:  [91 %-100 %] 100 %  BP: ()/(50-74) 92/54  Arterial Line BP: ()/(44-74) 100/51     Weight: 125.6 kg (277 lb)  Body mass index is 42.12 kg/m².    Review of Symptoms  Symptom Assessment (ESAS 0-10 scale)   ESAS 0 1 2 3 4 5 6 7 8 9 10   Pain      x        Dyspnea x             Anxiety x             Nausea x             Depression  x             Anorexia x             Fatigue x             Insomnia x             Restlessness  x             Agitation x               Bowel Management Plan (BMP): Yes    Comments: Senna docusate     Pain Assessment: No obvious pain    OME in 24 hours: 720 (continuous fentanyl infusion)    Performance Status: 40    ECOG Performance Status Grade: 3 - Confined to bed or chair 50% of waking hours    Physical  Exam   Constitutional: She appears ill. No distress. She is sedated, intubated and restrained.   HENT:   Head: Normocephalic and atraumatic.   Eyes: Pupils are equal, round, and reactive to light. Conjunctivae and EOM are normal. Right eye exhibits no discharge. Left eye exhibits no discharge.   Neck: Normal range of motion. Neck supple.   Central line in place   Cardiovascular: Normal rate and regular rhythm.   Pulses:       Radial pulses are 2+ on the right side, and 2+ on the left side.   Pulmonary/Chest: She is intubated.   Mechanically ventilated, comfortable appearing   Abdominal: Soft. There is no tenderness.   Musculoskeletal: She exhibits edema. She exhibits no tenderness.   Neurological: She is alert.   Awake and interactive   Skin: Skin is warm and dry. She is not diaphoretic. No erythema.   Nursing note and vitals reviewed.      Significant Labs:   All pertinent labs within the past 24 hours have been reviewed.  Recent Lab Results       08/19/19  1200   08/19/19  1152   08/19/19  0727   08/19/19  0623   08/19/19  0507        Albumin 2.2             Alkaline Phosphatase               Allens Test               ALT               Anion Gap 12             Aniso               aPTT               AST               Baso #               Basophil%               BILIRUBIN TOTAL               Site               BUN, Bld 3             Calcium 8.0             Chloride 107             CO2 21             Creatinine 0.5             DelSys               Differential Method               eGFR if  >60.0             eGFR if non  >60.0  Comment:  Calculation used to obtain the estimated glomerular filtration  rate (eGFR) is the CKD-EPI equation.                Eos #               Eosinophil%               FiO2               Glucose 148             Gran # (ANC)               Gran%               Hematocrit               Hemoglobin               Hypo               Immature Grans (Abs)                Immature Granulocytes               Coumadin Monitoring INR               Lymph #               Lymph%               Magnesium 2.2             MCH               MCHC               MCV               Mode               Mono #               Mono%               MPV               nRBC               Ovalocytes               PEEP               Phosphorus 2.2             Platelet Estimate               Platelets               POC BE               POC HCO3               POC PCO2               POC PH               POC PO2               POC SATURATED O2               POC TCO2               POCT Glucose   151 151 185 160     Poik               Poly               Potassium 4.5             PROTEIN TOTAL               Protime               Rate               RBC               RDW               Sample               Sodium 140             Target Cells               Tear Drop Cells               Vt               WBC                                08/19/19  0446   08/19/19  0417   08/19/19  0404   08/19/19  0324   08/19/19  0243        Albumin   2.2              2.2              2.2           Alkaline Phosphatase   105           Allens Test N/A             ALT   15           Anion Gap   9              9              9           Aniso   Moderate           aPTT   54.4  Comment:  aPTT therapeutic range = 39-69 seconds           AST   19           Baso #   0.01           Basophil%   0.1           BILIRUBIN TOTAL   1.0  Comment:  For infants and newborns, interpretation of results should be based  on gestational age, weight and in agreement with clinical  observations.  Premature Infant recommended reference ranges:  Up to 24 hours.............<8.0 mg/dL  Up to 48 hours............<12.0 mg/dL  3-5 days..................<15.0 mg/dL  6-29 days.................<15.0 mg/dL             Site [place]             BUN, Bld   3              3              3           Calcium   8.0              8.0              8.0           Chloride   105               105              105           CO2   24              24              24           Creatinine   0.5              0.5              0.5           DelSys Adult Vent             Differential Method   Automated           eGFR if    >60.0              >60.0              >60.0           eGFR if non    >60.0  Comment:  Calculation used to obtain the estimated glomerular filtration  rate (eGFR) is the CKD-EPI equation.                 >60.0  Comment:  Calculation used to obtain the estimated glomerular filtration  rate (eGFR) is the CKD-EPI equation.                 >60.0  Comment:  Calculation used to obtain the estimated glomerular filtration  rate (eGFR) is the CKD-EPI equation.              Eos #   0.0           Eosinophil%   0.0           FiO2 35             Glucose   142              142              142           Gran # (ANC)   16.6           Gran%   87.3           Hematocrit   26.9           Hemoglobin   7.4           Hypo   Occasional           Immature Grans (Abs)   0.24  Comment:  Mild elevation in immature granulocytes is non specific and   can be seen in a variety of conditions including stress response,   acute inflammation, trauma and pregnancy. Correlation with other   laboratory and clinical findings is essential.             Immature Granulocytes   1.3           Coumadin Monitoring INR   1.1  Comment:  Coumadin Therapy:  2.0 - 3.0 for INR for all indicators except mechanical heart valves  and antiphospholipid syndromes which should use 2.5 - 3.5.             Lymph #   0.7           Lymph%   3.8           Magnesium   1.9              1.9           MCH   26.0           MCHC   27.5           MCV   94           Mode AC/PRVC             Mono #   1.4           Mono%   7.5           MPV   13.0           nRBC   1           Ovalocytes   Occasional           PEEP 5             Phosphorus   3.7              3.7              3.7           Platelet Estimate   Decreased            Platelets   147           POC BE -6             POC HCO3 17.5             POC PCO2 24.6             POC PH 7.461             POC PO2 150             POC SATURATED O2 99             POC TCO2 18             POCT Glucose     160 148 137     Poik   Slight           Poly   Occasional           Potassium   4.4              4.4              4.4           PROTEIN TOTAL   6.0           Protime   11.4           Rate 18             RBC   2.85           RDW   25.0           Sample ARTERIAL             Sodium   138              138              138           Target Cells   Occasional           Tear Drop Cells   Occasional           Vt 400             WBC   18.99                            08/19/19  0133   08/19/19  0006   08/18/19  2326   08/18/19  2213   08/18/19  2208        Albumin         2.0              2.0     Alkaline Phosphatase               Allens Test               ALT               Anion Gap         8              8     Aniso               aPTT               AST               Baso #               Basophil%               BILIRUBIN TOTAL               Site               BUN, Bld         4              4     Calcium         7.8              7.8     Chloride         108              108     CO2         23              23     Creatinine         0.5              0.5     DelSys               Differential Method               eGFR if          >60.0              >60.0     eGFR if non          >60.0  Comment:  Calculation used to obtain the estimated glomerular filtration  rate (eGFR) is the CKD-EPI equation.                 >60.0  Comment:  Calculation used to obtain the estimated glomerular filtration  rate (eGFR) is the CKD-EPI equation.        Eos #               Eosinophil%               FiO2               Glucose         164              164     Gran # (ANC)               Gran%               Hematocrit               Hemoglobin               Hypo               Immature Grans (Abs)                Immature Granulocytes               Coumadin Monitoring INR               Lymph #               Lymph%               Magnesium         1.9              1.9     MCH               MCHC               MCV               Mode               Mono #               Mono%               MPV               nRBC               Ovalocytes               PEEP               Phosphorus         1.1              1.1     Platelet Estimate               Platelets               POC BE               POC HCO3               POC PCO2               POC PH               POC PO2               POC SATURATED O2               POC TCO2               POCT Glucose 147 140 188 167       Poik               Poly               Potassium         4.4              4.4     PROTEIN TOTAL               Protime               Rate               RBC               RDW               Sample               Sodium         139              139     Target Cells               Tear Drop Cells               Vt               WBC                                08/18/19  2110   08/18/19  2015   08/18/19  1931   08/18/19  1923   08/18/19  1806        Albumin               Alkaline Phosphatase               Allens Test               ALT               Anion Gap               Aniso               aPTT               AST               Baso #               Basophil%               BILIRUBIN TOTAL               Site               BUN, Bld               Calcium               Chloride               CO2               Creatinine               DelSys               Differential Method               eGFR if                eGFR if non                Eos #               Eosinophil%               FiO2               Glucose               Gran # (ANC)               Gran%               Hematocrit               Hemoglobin               Hypo               Immature Grans (Abs)               Immature Granulocytes               Coumadin Monitoring INR               Lymph #                Lymph%               Magnesium               MCH               MCHC               MCV               Mode               Mono #               Mono%               MPV               nRBC               Ovalocytes               PEEP               Phosphorus               Platelet Estimate               Platelets               POC BE               POC HCO3               POC PCO2               POC PH               POC PO2               POC SATURATED O2               POC TCO2               POCT Glucose 165 159 165 154 126     Poik               Poly               Potassium               PROTEIN TOTAL               Protime               Rate               RBC               RDW               Sample               Sodium               Target Cells               Tear Drop Cells               Vt               WBC                                  CBC:   Recent Labs   Lab 08/19/19  0417   WBC 18.99*   HGB 7.4*   HCT 26.9*   MCV 94   *     BMP:  Recent Labs   Lab 08/19/19  1200   *      K 4.5      CO2 21*   BUN 3*   CREATININE 0.5   CALCIUM 8.0*   MG 2.2     LFT:  Lab Results   Component Value Date    AST 19 08/19/2019    GGT 54 10/24/2018    ALKPHOS 105 08/19/2019    BILITOT 1.0 08/19/2019     Albumin:   Albumin   Date Value Ref Range Status   08/19/2019 2.2 (L) 3.5 - 5.2 g/dL Final     Protein:   Total Protein   Date Value Ref Range Status   08/19/2019 6.0 6.0 - 8.4 g/dL Final     Lactic acid:   Lab Results   Component Value Date    LACTATE 3.9 (HH) 08/10/2019    LACTATE 1.2 08/09/2019       Significant Imaging: CXR: I have reviewed all pertinent results/findings within the past 24 hours:  Hilar prominence    Advance Care Planning   Advanced Directives::  Living Will: Yes. Copy on chart: Yes  LaPOST: No  Do Not Resuscitate Status: No  Medical Power of : Yes. Agent's Name: Maikel Negron Agent's Contact Number: [phone]    Decision-Making Capacity: Family answered questions        Living Arrangements: Lives alone    Psychosocial/Cultural:  Patient's most important priorities:  Quality of life     Patient's biggest concerns/fears:  Not discussed    Previous death/end of life care history:  Per chart review (not discussed in interview today) was on hospice in 2014 for CHF.     Patient's goals/hopes:  Not discussed    Spiritual: Yes     F- Fely and Belief: Zoroastrianism    I - Importance: Yes   .  C - Community: Have been in contact with her     A - Address in Care: Yes

## 2019-08-19 NOTE — CONSULTS
Palliative Care Acknowledgement of Consult - .date    Consult received. Palliative Care Provider: Dr. Aguillon will touch base with team prior to seeing patient. Full consult to follow.    Thank you for allowing us to be a part of the care of this patient.          Arabella Ahmadi, SERGIO, ACHP-SW

## 2019-08-20 NOTE — PROGRESS NOTES
CRRT restarted after machine changed pt tolerating well family at bedside. Primary nurse notified.

## 2019-08-20 NOTE — PLAN OF CARE
Per CCM team, Patient not medically stable for stepdown at this time. Patient requires continued monitoring and interventions from [facility] service for Intubation/Ventilator setting changes, Pressor support to maintain MAP >65 and need for CRRT. Patient's family have met with CCM team and Palliative care for discussions in further GOC. Patient's son (POA) stated that he is giving the family time to come to terms with the patient's medical state, however if the family has not come to a decision by Thursday (8/22) the son (POA) will make the decision to transition the patient to [facility] at that time. CM remains available for any further needs or concerns.        08/20/19 1604   Discharge Reassessment   Assessment Type Discharge Planning Reassessment   Provided patient/caregiver education on the expected discharge date and the discharge plan No   Do you have any problems affording any of your prescribed medications? No   Discharge Plan A Inpatient Hospice   Discharge Plan B Long-term acute care facility (LTAC)   DME Needed Upon Discharge  other (see comments)  (TBD)   Anticipated Discharge Disposition [facility]   Can the patient answer the patient profile reliably? No, cognitively impaired   Describe the patient's ability to walk at the present time. Does not walk or unable to take any steps at all   Number of comorbid conditions (as recorded on the chart) Five or more   Post-Acute Status   Discharge Delays (!) Patient and Family Barriers     Vanessa Wharton RN, [facility]  Case Management-Critical Care     [phone]

## 2019-08-20 NOTE — ASSESSMENT & PLAN NOTE
Course lung sounds and CXR concerning for PNA in LLL with interstitial fullness with leukocytosis without fevers. Respiratory cultures positive. WBC slight uptrend w/ episode of hypothermia yesterday.   - ETT culture from 8/4 + MSSA, PSA and moraxella   - continue bactrim for PJP prophylaxis w/ steroid use   - respiratory cx positive for ESBL klebsiella oxytoca - on meropenem   - AFB, PJP negative   - Blood cultures no growth to date   - continue to trend trend WBC; no fever/hypothermia   - ID signed off

## 2019-08-20 NOTE — PROGRESS NOTES
[facility]  Palliative Medicine  Progress Note    Patient Name: Sisi Medel  MRN: [id number]  Admission Date: 8/3/2019  Hospital Length of Stay: 17 days  Code Status: Full Code   Attending Provider: Flakita Soto MD  Consulting Provider: Jeremy Gonzalez MD  Primary Care Physician: Carlos A Caputo MD  Principal Problem:Acute on chronic respiratory failure with hypoxia and hypercapnia    Patient information was obtained from relative(s).      Assessment/Plan:     Palliative care encounter  Palliative Care Encounter / Goals of care discussion:      Narrative:   Sisi Medel is a 58 y.o. female patient with hypoxic respiratory failure secondary to sarcoidosis and CHF exacerbation on ventilator, polymicrobial sputum cultures, requiring vasopressors and ESRD on CRRT. Clinically unchanged today.     Patient is still not awake or alert enough to interact. Discussed with patient's son via phone today. He is planning family discussions tonight with his brother, patient's sisters and mother. Offered to speak to any family members if helpful and organizing meeting at the hospital. Patient's son does not feel he needs assistance at this time.     2014 documentation has patient's mother listed as POA. I brought this to her son's attention and he will speak to her tonight regarding her daughter's illness. Plan is to develop family consensus on management plan while proceeding with time limited trial.     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 Son is trying to speak in his mother's interest. He has been very involved in her care for many years going back to his childhood when she first developed medical problems. He had previously discussed with critical care service that a time limited trial would be undertaken and changes to management would be reassessed at the end of this week on Thursday or Friday. He has another brother who just moved back into town, has not been involved in family matters for an extended time.     5: Goals of care Decisions / Recommendations / Plan:  Continue current management for now  Time limited trial of ventilation, pressors, CRRT.   Will be reassessed daily and at the end of the week    6: Follow up plans:    I will follow up with the patient for support and at the end of the week for questions regarding changes in management.     Thank you for your consult. I will follow along with you. Please call [phone] with questions.           I will follow-up with patient. Please contact us if you have any additional questions.    Subjective:     Chief Complaint:   Chief Complaint   Patient presents with    Shortness of Breath     Pt c/o SOB after dialysis treatment today. Pt reports productive cough and anxiety. Pt is on 2 lpm of home O2 at all times.        HPI:   58 F with ESRD, pulmonary sarcoidosis and HFrEF was admitted for SOB at dialysis. Initially treated for volume overload with dialysis but respiratory status worsened requiring ICU admission. Further worsened and required intubation. Developed septic shock and has required prolonged vasopressors, mechanical ventilation. Palliative Care is consulted for goals of care discussion with family.     Son at bedside provides history. Patient has been dealing with chronic disease for many years. He describes "multiple angles" and is able to provide a timeline of ESRD, DM2, sarcoid, CHF over decades since his childhood. " He has had discussions regarding end of life care in the past with patient who has related that she would not want long term life support measures. Her health has declined significantly in the preceding months and acute exacerbation requiring hospitalization was not unexpected for them. He reports being designated decision maker by his mother, although he was in the process of getting appropriate documentation and has not been made medical decision maker.     Hospital Course:  No notes on file    Interval History: Modest decrease in oxygen requirements. Still on low dose vasopressors. Precedex stopped, on propofol and fentanyl for sedation.     Medications:  Continuous Infusions:   sodium chloride 0.9% 200 mL/hr at 19 1643    fentanyl 5 mL/hr at 19 1300    heparin (porcine) in D5W 13 Units/kg/hr (19 1300)    norepinephrine bitartrate-D5W 0.03 mcg/kg/min (19 1300)    propofol 10 mcg/kg/min (19 1500)    vasopressin (PITRESSIN) infusion 0.04 Units/min (19 1000)     Scheduled Meds:   chlorhexidine  15 mL Mouth/Throat BID    cinacalcet  30 mg Oral Daily with breakfast    clopidogrel  75 mg Oral Daily    epoetin lily-ebpx (RETACRIT) injection  50 Units/kg Intravenous Every Mon, Wed, Fri    famotidine  20 mg Per OG tube Daily    fludrocortisone  100 mcg Oral Daily    hydrocortisone sodium succinate  100 mg Intravenous Q8H    levetiracetam IVPB  500 mg Intravenous Q12H    meropenem (MERREM) IVPB  2 g Intravenous Q8H    miconazole NITRATE 2 %   Topical (Top) BID    midodrine  10 mg Oral TID    polyethylene glycol  17 g Oral BID    prednisoLONE acetate  1 drop Left Eye TID    senna-docusate 8.6-50 mg  1 tablet Oral BID    sulfamethoxazole-trimethoprim 800-160mg  1 tablet Per G Tube BID    valproate sodium (DEPACON) IVPB  250 mg Intravenous Q8H     PRN Meds:acetaminophen, albuterol-ipratropium, Dextrose 10% Bolus, Dextrose 10% Bolus, dextrose 50%, [] fentaNYL  **FOLLOWED BY** fentaNYL, glucagon (human recombinant), glucose, glucose, heparin (PORCINE), heparin (PORCINE), insulin aspart U-100, LORazepam, magnesium sulfate IVPB, ondansetron, sodium chloride 0.9%, sodium phosphate IVPB, sodium phosphate IVPB, sodium phosphate IVPB, tiZANidine    Objective:     Vital Signs (Most Recent):  Temp: 98.8 °F (37.1 °C) (08/20/19 1500)  Pulse: 70 (08/20/19 1712)  Resp: 18 (08/19/19 0434)  BP: (!) 95/58 (08/20/19 1518)  SpO2: 100 % (08/20/19 1712) Vital Signs (24h Range):  Temp:  [97.6 °F (36.4 °C)-98.8 °F (37.1 °C)] 98.8 °F (37.1 °C)  Pulse:  [0-76] 70  SpO2:  [90 %-100 %] 100 %  BP: ()/(52-79) 95/58  Arterial Line BP: ()/() 327/327     Weight: 127.5 kg (281 lb)  Body mass index is 42.73 kg/m².    Review of Symptoms  Unable to assess    Bowel Management Plan (BMP): Yes    Comments: senna-docusate    Pain Assessment: Unable to assess, appears comfortable    OME in 24 hours: approx 450 (continous fentanyl infusion 50-100mcg/hr)    Performance Status: 40    ECOG Performance Status Grade: 3 - Confined to bed or chair 50% of waking hours    Physical Exam   Constitutional: She appears ill. No distress. She is sedated, intubated and restrained.   HENT:   Head: Normocephalic and atraumatic.   Eyes: Pupils are equal, round, and reactive to light. Conjunctivae and EOM are normal. Right eye exhibits no discharge. Left eye exhibits no discharge.   Neck: Normal range of motion. Neck supple.   Central line in place   Cardiovascular: Normal rate and regular rhythm.   Pulses:       Radial pulses are 2+ on the right side, and 2+ on the left side.   Pulmonary/Chest: She is intubated.   Mechanically ventilated, comfortable appearing   Abdominal: Soft. There is no tenderness.   Musculoskeletal: She exhibits edema. She exhibits no tenderness.   Neurological: She is alert.   Awake and interactive   Skin: Skin is warm and dry. She is not diaphoretic. No erythema.   Nursing note and vitals  reviewed.      Significant Labs:   CBC:   Recent Labs   Lab 08/20/19  0347   WBC 15.98*   HGB 7.1*   HCT 25.4*   MCV 93   *     BMP:  Recent Labs   Lab 08/20/19  1445   *      K 4.5      CO2 21*   BUN 4*   CREATININE 0.5   CALCIUM 8.2*   MG 2.0     LFT:  Lab Results   Component Value Date    AST 18 08/20/2019    GGT 54 10/24/2018    ALKPHOS 97 08/20/2019    BILITOT 0.9 08/20/2019     Albumin:   Albumin   Date Value Ref Range Status   08/20/2019 2.1 (L) 3.5 - 5.2 g/dL Final     Protein:   Total Protein   Date Value Ref Range Status   08/20/2019 5.7 (L) 6.0 - 8.4 g/dL Final     Lactic acid:   Lab Results   Component Value Date    LACTATE 3.9 (HH) 08/10/2019    LACTATE 1.2 08/09/2019         Advanced Directives::  Advanced Directives::  Living Will: Yes. Copy on chart: Yes  LaPOST: No  Do Not Resuscitate Status: No  Medical Power of : Yes. Agent's Name: Maikel Negron Agent's Contact Number: [phone]     Decision-Making Capacity: Family answered questions        Living Arrangements: Lives alone     Psychosocial/Cultural:  Patient's most important priorities:  Quality of life      Patient's biggest concerns/fears:  Not discussed     Previous death/end of life care history:  Per chart review was on hospice in 2014 for CHF.      Patient's goals/hopes:  Not discussed     Spiritual: Yes      F- Fely and Belief: Episcopal     I - Importance: Yes     .  C - Community: Have been in contact with her      A - Address in Care: Yes      > 50% of 40 min visit spent in chart review, face to face discussion of goals of care,  symptom assessment, coordination of care and emotional support.    Jeremy Gonzalez MD  Palliative Medicine  [facility]

## 2019-08-20 NOTE — ASSESSMENT & PLAN NOTE
Mental status stable. On precedex, fentanyl and propofol for sedation. Discontinue precedex   - CTH unremarkable, EEG 8/7 showed encephalopathy

## 2019-08-20 NOTE — SUBJECTIVE & OBJECTIVE
Interval History: Modest decrease in oxygen requirements. Still on low dose vasopressors. Precedex stopped, on propofol and fentanyl for sedation.     Medications:  Continuous Infusions:   sodium chloride 0.9% 200 mL/hr at 19 1643    fentanyl 5 mL/hr at 19 1300    heparin (porcine) in D5W 13 Units/kg/hr (19 1300)    norepinephrine bitartrate-D5W 0.03 mcg/kg/min (19 1300)    propofol 10 mcg/kg/min (19 1500)    vasopressin (PITRESSIN) infusion 0.04 Units/min (19 1000)     Scheduled Meds:   chlorhexidine  15 mL Mouth/Throat BID    cinacalcet  30 mg Oral Daily with breakfast    clopidogrel  75 mg Oral Daily    epoetin lily-ebpx (RETACRIT) injection  50 Units/kg Intravenous Every Mon, Wed, Fri    famotidine  20 mg Per OG tube Daily    fludrocortisone  100 mcg Oral Daily    hydrocortisone sodium succinate  100 mg Intravenous Q8H    levetiracetam IVPB  500 mg Intravenous Q12H    meropenem (MERREM) IVPB  2 g Intravenous Q8H    miconazole NITRATE 2 %   Topical (Top) BID    midodrine  10 mg Oral TID    polyethylene glycol  17 g Oral BID    prednisoLONE acetate  1 drop Left Eye TID    senna-docusate 8.6-50 mg  1 tablet Oral BID    sulfamethoxazole-trimethoprim 800-160mg  1 tablet Per G Tube BID    valproate sodium (DEPACON) IVPB  250 mg Intravenous Q8H     PRN Meds:acetaminophen, albuterol-ipratropium, Dextrose 10% Bolus, Dextrose 10% Bolus, dextrose 50%, [] fentaNYL **FOLLOWED BY** fentaNYL, glucagon (human recombinant), glucose, glucose, heparin (PORCINE), heparin (PORCINE), insulin aspart U-100, LORazepam, magnesium sulfate IVPB, ondansetron, sodium chloride 0.9%, sodium phosphate IVPB, sodium phosphate IVPB, sodium phosphate IVPB, tiZANidine    Objective:     Vital Signs (Most Recent):  Temp: 98.8 °F (37.1 °C) (19 1500)  Pulse: 70 (19 1712)  Resp: 18 (19 0434)  BP: (!) 95/58 (19 1518)  SpO2: 100 % (19 1712) Vital Signs (24h  Range):  Temp:  [97.6 °F (36.4 °C)-98.8 °F (37.1 °C)] 98.8 °F (37.1 °C)  Pulse:  [0-76] 70  SpO2:  [90 %-100 %] 100 %  BP: ()/(52-79) 95/58  Arterial Line BP: ()/() 327/327     Weight: 127.5 kg (281 lb)  Body mass index is 42.73 kg/m².    Review of Symptoms  Unable to assess    Bowel Management Plan (BMP): Yes    Comments: senna-docusate    Pain Assessment: Unable to assess, appears comfortable    OME in 24 hours: approx 450 (continous fentanyl infusion 50-100mcg/hr)    Performance Status: 40    ECOG Performance Status Grade: 3 - Confined to bed or chair 50% of waking hours    Physical Exam   Constitutional: She appears ill. No distress. She is sedated, intubated and restrained.   HENT:   Head: Normocephalic and atraumatic.   Eyes: Pupils are equal, round, and reactive to light. Conjunctivae and EOM are normal. Right eye exhibits no discharge. Left eye exhibits no discharge.   Neck: Normal range of motion. Neck supple.   Central line in place   Cardiovascular: Normal rate and regular rhythm.   Pulses:       Radial pulses are 2+ on the right side, and 2+ on the left side.   Pulmonary/Chest: She is intubated.   Mechanically ventilated, comfortable appearing   Abdominal: Soft. There is no tenderness.   Musculoskeletal: She exhibits edema. She exhibits no tenderness.   Neurological: She is alert.   Awake and interactive   Skin: Skin is warm and dry. She is not diaphoretic. No erythema.   Nursing note and vitals reviewed.      Significant Labs:   CBC:   Recent Labs   Lab 08/20/19  0347   WBC 15.98*   HGB 7.1*   HCT 25.4*   MCV 93   *     BMP:  Recent Labs   Lab 08/20/19  1445   *      K 4.5      CO2 21*   BUN 4*   CREATININE 0.5   CALCIUM 8.2*   MG 2.0     LFT:  Lab Results   Component Value Date    AST 18 08/20/2019    GGT 54 10/24/2018    ALKPHOS 97 08/20/2019    BILITOT 0.9 08/20/2019     Albumin:   Albumin   Date Value Ref Range Status   08/20/2019 2.1 (L) 3.5 - 5.2 g/dL  Final     Protein:   Total Protein   Date Value Ref Range Status   08/20/2019 5.7 (L) 6.0 - 8.4 g/dL Final     Lactic acid:   Lab Results   Component Value Date    LACTATE 3.9 (HH) 08/10/2019    LACTATE 1.2 08/09/2019         Advanced Directives::  Advanced Directives::  Living Will: Yes. Copy on chart: Yes  LaPOST: No  Do Not Resuscitate Status: No  Medical Power of : Yes. Agent's Name: Maikel Negron Agent's Contact Number: [phone]     Decision-Making Capacity: Family answered questions        Living Arrangements: Lives alone     Psychosocial/Cultural:  Patient's most important priorities:  Quality of life      Patient's biggest concerns/fears:  Not discussed     Previous death/end of life care history:  Per chart review was on hospice in 2014 for CHF.      Patient's goals/hopes:  Not discussed     Spiritual: Yes      F- Fely and Belief: Yazdanism     I - Importance: Yes     .  C - Community: Have been in contact with her      A - Address in Care: Yes

## 2019-08-20 NOTE — ASSESSMENT & PLAN NOTE
Palliative Care Encounter / Goals of care discussion:      Narrative:   Sisi Medel is a 58 y.o. female patient with hypoxic respiratory failure secondary to sarcoidosis and CHF exacerbation on ventilator, polymicrobial sputum cultures, requiring vasopressors and ESRD on CRRT. Clinically unchanged today.     Patient is still not awake or alert enough to interact. Discussed with patient's son via phone today. He is planning family discussions tonight with his brother, patient's sisters and mother. Offered to speak to any family members if helpful and organizing meeting at the hospital. Patient's son does not feel he needs assistance at this time.     2014 documentation has patient's mother listed as POA. I brought this to her son's attention and he will speak to her tonight regarding her daughter's illness. Plan is to develop family consensus on management plan while proceeding with time limited trial.     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 Son is trying to speak in his mother's interest. He has been very involved in her care for many years going back to his childhood when she first developed medical problems. He had previously discussed with critical care service that a time limited trial would be undertaken and changes to management would be reassessed at the end of this week on Thursday or Friday. He has another brother who just moved back into town, has not been involved in family matters for an  extended time.     5: Goals of care Decisions / Recommendations / Plan:  Continue current management for now  Time limited trial of ventilation, pressors, CRRT.   Will be reassessed daily and at the end of the week    6: Follow up plans:    I will follow up with the patient for support and at the end of the week for questions regarding changes in management.     Thank you for your consult. I will follow along with you. Please call [phone] with questions.

## 2019-08-20 NOTE — ASSESSMENT & PLAN NOTE
Patient has hx of HF (EF 35%). CXR in the ED suggestive of HF. BNP 1424 and troponins 0.156.  Adherence to home medication regimen is unclear.  - Repeat TTE EF 25% on 8/11, LVH, G3DD, LAE/JAGUAR, moderate RV systolic dysfunction   - Continue CRRT  - Trial of dobutamine resulted in unsustained PVCs and hypotension requiring an increase in pressor support.

## 2019-08-20 NOTE — PLAN OF CARE
"Problem: Adult Inpatient Plan of Care  Goal: Plan of Care Review  Outcome: Ongoing (interventions implemented as appropriate)  Dx: Acute on chronic respiratory failure with hypoxia and hypercapnia    Shift Events: A line removed, PIV right hand removed    Neuro: Sedated, Arouses to Voice, Follows Commands and Moves All Extremities    Vital Signs: BP 96/65 (BP Location: Right arm, Patient Position: Lying)   Pulse 71   Temp 97.6 °F (36.4 °C) (Oral)   Resp 18   Ht 5' 8" (1.727 m)   Wt 127.5 kg (281 lb)   LMP  (LMP Unknown)   SpO2 100%   Breastfeeding? No   BMI 42.73 kg/m²  Vent settings: AC, 35 FIO2, 5 PEEP, rate 15    Diet: NPO and Tube Feeds    Gtts: Propfol, Fentanyl, Precedex, Norepinephrine, Vasopressin and MIVF    Urine Output: Anuric 0 cc/shift    Drains:None    CRRT UF goal 350-400. SLED.     Restraints No injuries noted. Repositioned frequently.  Need for restraints reassesed. Will remove when appropriate.          Labs/Accuchecks: Labs monitored and replaced as needed. Accuchecks Q4H.    Skin: See flowsheets.  Repositioned frequently.  Heel and sacral foams in place.  Sacral skin breakdown noted.  Barrier cream and sacral foam applied.          "

## 2019-08-20 NOTE — SUBJECTIVE & OBJECTIVE
Interval History/Significant Events:   Administration of dobutamine resulted in unsustained VTach and profound hypotension, pressor requirements increased and vasopressin was restarted.     Review of Systems   Unable to perform ROS: Intubated     Objective:     Vital Signs (Most Recent):  Temp: 97.6 °F (36.4 °C) (08/20/19 0300)  Pulse: 70 (08/20/19 0725)  Resp: 18 (08/19/19 0434)  BP: 96/65 (08/20/19 0600)  SpO2: 100 % (08/20/19 0725) Vital Signs (24h Range):  Temp:  [97.6 °F (36.4 °C)-98.6 °F (37 °C)] 97.6 °F (36.4 °C)  Pulse:  [0-76] 70  SpO2:  [90 %-100 %] 100 %  BP: ()/(50-79) 96/65  Arterial Line BP: ()/() 327/327   Weight: 127.5 kg (281 lb)  Body mass index is 42.73 kg/m².      Intake/Output Summary (Last 24 hours) at 8/20/2019 0730  Last data filed at 8/20/2019 0600  Gross per 24 hour   Intake 8380 ml   Output 6256 ml   Net 2124 ml       Physical Exam   Constitutional: She appears ill. No distress. She is sedated, intubated and restrained.   HENT:   Head: Normocephalic and atraumatic.   Eyes: Conjunctivae are normal. Right eye exhibits no discharge. Left eye exhibits no discharge.   Neck: Neck supple.   trialysis catheter in place. Central line in place   Cardiovascular: Normal rate, normal heart sounds and intact distal pulses.   No murmur heard.  Pulses:       Radial pulses are 2+ on the right side, and 2+ on the left side.   Pulmonary/Chest: Effort normal. No stridor. She is intubated. No respiratory distress. She has no wheezes. She has rales.   Abdominal: Soft. She exhibits no distension.   Femoral A-line in place   Musculoskeletal: She exhibits no tenderness.   Neurological: She is alert.   Follows basic commands.   Skin: Skin is warm and dry. She is not diaphoretic. No erythema.   Sacral ulcer    Nursing note and vitals reviewed.      Vents:  Vent Mode: A/C (08/20/19 0725)  Ventilator Initiated: Yes (08/10/19 1742)  Set Rate: 15 bmp (08/20/19 0725)  Vt Set: 400 mL (08/20/19  0725)  Pressure Support: 10 cmH20 (08/12/19 0645)  PEEP/CPAP: 5 cmH20 (08/20/19 0725)  Oxygen Concentration (%): 35 (08/20/19 0725)  Peak Airway Pressure: 26 cmH2O (08/20/19 0725)  Plateau Pressure: 24 cmH20 (08/20/19 0725)  Total Ve: 5.78 mL (08/20/19 0725)  F/VT Ratio<105 (RSBI): (!) 46.88 (08/19/19 0434)  Lines/Drains/Airways     Central Venous Catheter Line                 Percutaneous Central Line Insertion/Assessment - triple lumen  08/04/19 1803 left internal jugular 15 days         Hemodialysis Catheter 08/13/19 0400 right internal jugular 7 days          Drain                 Hemodialysis AV Graft Left upper arm -- days         NG/OG Tube 08/11/19 1600 Center mouth 8 days          Airway                 Airway - Non-Surgical 08/10/19 1756 Endotracheal Tube 9 days              Significant Labs:    CBC/Anemia Profile:  Recent Labs   Lab 08/19/19  0417 08/20/19  0347   WBC 18.99* 15.98*   HGB 7.4* 7.1*   HCT 26.9* 25.4*   * 134*   MCV 94 93   RDW 25.0* 24.6*        Chemistries:  Recent Labs   Lab 08/19/19  0417 08/19/19  1200 08/19/19  2205 08/20/19  0347     138  138 140 141 141  141   K 4.4  4.4  4.4 4.5 4.1 4.6  4.6     105  105 107 108 108  108   CO2 24  24  24 21* 24 24  24   BUN 3*  3*  3* 3* 3* 4*  4*   CREATININE 0.5  0.5  0.5 0.5 0.5 0.4*  0.4*   CALCIUM 8.0*  8.0*  8.0* 8.0* 7.9* 7.9*  7.9*   ALBUMIN 2.2*  2.2*  2.2* 2.2* 2.0* 2.0*  2.0*   PROT 6.0  --   --  5.7*   BILITOT 1.0  --   --  0.9   ALKPHOS 105  --   --  97   ALT 15  --   --  13   AST 19  --   --  18   MG 1.9  1.9 2.2 1.9 2.0   PHOS 3.7  3.7  3.7 2.2* 1.8* 1.9*  1.9*       ABGs:   Recent Labs   Lab 08/19/19  0446   PH 7.461*   PCO2 24.6*   HCO3 17.5*   POCSATURATED 99   BE -6       Significant Imaging:  I have reviewed all pertinent imaging results/findings within the past 24 hours.

## 2019-08-20 NOTE — MEDICAL/APP STUDENT
Patient Name: Sisi Medel  MRN: [id number]  Admission Date: 8/3/2019  Hospital Length of Stay: 16 days  Attending Provider: Edward Le*   Primary Care Physician: Carlos A Caputo MD  Principal Problem:Acute on chronic respiratory failure with hypoxia and hypercapnia    Subjective:    HPI:  Ms Medel  57 yo f with ESRD on HD, cervical radiculopathy, CHF, Chronic resp failure with hypoxia, T2DM, DDD, paroxysmal Afib, seizure, shingles, thyroid disorder here for a 3 day history of cough and SOB. Patient was at dialysis and had to stop at 1.6L due to resp distress. She was put in a nonrebreather at 12L of oxygen. She was transported via EMS. Overnight she required BiPAP for worsening SOB. Rapid response was called this morning because she was hypotensive 70s/50s. Dr. Rosas, MICU team, went and recheck BP was 107/59. She later became hypertensive again and continued to be on BiPAP, she was stepped up for dialysis, BP monitoring, possible vasopressor requirement and further respiratory monitoring.      During step up she was persistently hypoglycemic and hypotensive, peripheral vascular access was lost and a left TLC was placed at bedside. It was determined she could not tolerated her normal HD and she had tenuous respiratory status, so decision was made to intubate. She has been started on broad spectrum abx, and right HD IJ catheter was placed. Nephro been made aware and SCUF planned for today.     Interval History:  Patient seen on SLED this morning at 350mL UF. Ventilation requirements still stable overnight, AC/VC FiO2 35%, SpO2 100%, PEEP-5, rate 15. Vasopressor requirements increased when on CRRT, however decreased yesterday. On fentanyl, precedex, vasopressin, levophed, and intermittent propofol when seen this morning. Palliative care d/w son goals of care yesterday. For time limited trial of life-prolonging measures, until reassess Thursday/Friday, then potentially for comfort  [facility].    Objective:     Vitals:    08/20/19 0745 08/20/19 0800 08/20/19 0815 08/20/19 0912   BP: 96/67 97/69 97/68    BP Location:       Patient Position:       Pulse: 69 70 70 70   Resp:       Temp:       TempSrc:       SpO2: 100% 100% 100% 100%   Weight:       Height:         Physical Exam   Constitutional: She appears well-developed. She appears ill. She is sedated and intubated.   Cardiovascular: Normal rate, regular rhythm and normal heart sounds.   No murmur heard.  Pulmonary/Chest: She is intubated. She has rales.   Abdominal: She exhibits mass.   Musculoskeletal: She exhibits edema.     Weight: 125.6 kg (277 lb) (08/19/19 0400)  Body mass index is 42.12 kg/m².  Body surface area is 2.45 meters squared.    I/Os last 24hours  In: 8420 (IV 7350, NG 1070)  Out: 6745 (CRRT 6745, 1*stool)  Net: +1675    Recent Labs     08/18/19  0409 08/18/19  0421  08/19/19  0417 08/19/19  0446 08/19/19  1200 08/19/19  2205 08/20/19  0347     137  --    < > 138  138  138  --  140 141 141  141   K 4.6  4.6  --    < > 4.4  4.4  4.4  --  4.5 4.1 4.6  4.6     105  --    < > 105  105  105  --  107 108 108  108   CO2 22*  22*  --    < > 24  24  24  --  21* 24 24  24   BUN 6  6  --    < > 3*  3*  3*  --  3* 3* 4*  4*   CREATININE 0.6  0.6  --    < > 0.5  0.5  0.5  --  0.5 0.5 0.4*  0.4*   CALCIUM 8.1*  8.1*  --    < > 8.0*  8.0*  8.0*  --  8.0* 7.9* 7.9*  7.9*   PHOS 2.4*  2.4*  --    < > 3.7  3.7  3.7  --  2.2* 1.8* 1.9*  1.9*   MG 1.9  --    < > 1.9  1.9  --  2.2 1.9 2.0   ALBUMIN 2.1*  2.1*  --    < > 2.2*  2.2*  2.2*  --  2.2* 2.0* 2.0*  2.0*   HGB 7.7*  --   --  7.4*  --   --   --  7.1*   HCT 26.8*  --   --  26.9*  --   --   --  25.4*   *  --   --  147*  --   --   --  134*   PH  --  7.444  --   --  7.461*  --   --   --    PCO2  --  32.7*  --   --  24.6*  --   --   --     < > = values in this interval not displayed.     All labs within the last 24 hours have been  reviewed.  Assessment:   ESRD on hemodialysis  Outpatient HD unit: [facility]   -Nephrologist: Patient does not know  -HD tx days: TThS  -HD tx time: 4hrs 45mins    -HD access: LUE AVG  -HD modality: iHD  -Residual urine: Minimal  -EDW: 109 kg    Plan:   Plan:  - On SLED, will continue for volume management  - Target -400 ml/hr, as long as pressor requirements don't increase/remain stable, as tolerated by BP, keep MAP >65. Titrate drips as indicated.  - Pt on heparin infusion, no need for citrate   - Continue to monitor intake and output, daily weights   - Avoid nephrotoxic medication and renal dose medications to GFR  - Will follow closely

## 2019-08-20 NOTE — ASSESSMENT & PLAN NOTE
On 10 mg midodrine TID at home.Currently on pressor support.    - Continue midodrine  - levophed/vaso - titrate to map 65  - Pt has been able to maintain BP for CRRT w/ increased pressor support  - Wean pressor support if tolerated

## 2019-08-20 NOTE — PROGRESS NOTES
[facility]  Critical Care Medicine  Progress Note    Patient Name: Sisi Medel  MRN: [id number]  Admission Date: 8/3/2019  Hospital Length of Stay: 17 days  Code Status: Full Code  Attending Provider: Flakita Soto MD  Primary Care Provider: Carlos A Caputo MD   Principal Problem: Acute on chronic respiratory failure with hypoxia and hypercapnia    Subjective:     HPI:  Ms Medel  57 yo f with ESRD on HD, cervical radiculopathy, CHF, Chronic resp failure with hypoxia, T2DM, DDD, paroxysmal Afib, seizure, shingles, thyroid disorder here for a 3 day history of cough and SOB. Patient was at dialysis and had to stop at 1.6L due to resp distress. She was put in a nonrebreather at 12L of oxygen. She was transported via EMS. Overnight she required BiPAP for worsening SOB. Rapid response was called this morning because she was hypotensive 70s/50s. Dr. Rosas, MICU team, went and recheck BP was 107/59. She later became hypertensive again and continued to be on BiPAP, she was stepped up for dialysis, BP monitoring, possible vasopressor requirement and further respiratory monitoring.     During step up she was persistently hypoglycemic and hypotensive, peripheral vascular access was lost and a left TLC was placed at bedside. It was determined she could not tolerated her normal HD and she had tenuous respiratory status, so decision was made to intubate. She has been started on broad spectrum abx, and right HD IJ catheter was placed. Nephro been made aware and SCUF planned for today.     Hospital/ICU Course:  Admitted on 08/03 for what appeared to be heart failure exacerbation. Care escalated to ICU due to her tenuous respirashe was intubated and started on broad spectrum abx. She was unable to tolerate her HD so patient has been on CRRT. In evaluating her AMS, CTH was unremarkable and EEG was consistent with encephalopathy. Patient had worsening leukocytosis and BAL on 8/9 was positive for  klebsiella. She is currently on meropenem. Due to chronic steroid use for her sarcoidosis, she is also on bactrim for PJP prophylaxis.  Frequent SAT results in significant tachypnea and agitation. On 8/10 extubation was attempted but failed so was re-intubated for increased work of breathing and tachypnea.   Pressor requirements have decreased however they still need to be raised when pt becomes more alert. On 8/16 pt had several hours during which temp fell to 94, but had resolved by the morning. On 8/18 pt pressor requirements increased. Dr. Le spoke with son regarding goals of care, in which he voiced that his mother wouldn't want to be on long term ventilator and dialysis support.    Interval History/Significant Events:   Administration of dobutamine resulted in unsustained VTach and profound hypotension, pressor requirements increased and vasopressin was restarted.     Review of Systems   Unable to perform ROS: Intubated     Objective:     Vital Signs (Most Recent):  Temp: 97.6 °F (36.4 °C) (08/20/19 0300)  Pulse: 70 (08/20/19 0725)  Resp: 18 (08/19/19 0434)  BP: 96/65 (08/20/19 0600)  SpO2: 100 % (08/20/19 0725) Vital Signs (24h Range):  Temp:  [97.6 °F (36.4 °C)-98.6 °F (37 °C)] 97.6 °F (36.4 °C)  Pulse:  [0-76] 70  SpO2:  [90 %-100 %] 100 %  BP: ()/(50-79) 96/65  Arterial Line BP: ()/() 327/327   Weight: 127.5 kg (281 lb)  Body mass index is 42.73 kg/m².      Intake/Output Summary (Last 24 hours) at 8/20/2019 0730  Last data filed at 8/20/2019 0600  Gross per 24 hour   Intake 8380 ml   Output 6256 ml   Net 2124 ml       Physical Exam   Constitutional: She appears ill. No distress. She is sedated, intubated and restrained.   HENT:   Head: Normocephalic and atraumatic.   Eyes: Conjunctivae are normal. Right eye exhibits no discharge. Left eye exhibits no discharge.   Neck: Neck supple.   trialysis catheter in place. Central line in place   Cardiovascular: Normal rate, normal heart  sounds and intact distal pulses.   No murmur heard.  Pulses:       Radial pulses are 2+ on the right side, and 2+ on the left side.   Pulmonary/Chest: Effort normal. No stridor. She is intubated. No respiratory distress. She has no wheezes. She has rales.   Abdominal: Soft. She exhibits no distension.   Femoral A-line in place   Musculoskeletal: She exhibits no tenderness.   Neurological: She is alert.   Follows basic commands.   Skin: Skin is warm and dry. She is not diaphoretic. No erythema.   Sacral ulcer    Nursing note and vitals reviewed.      Vents:  Vent Mode: A/C (08/20/19 0725)  Ventilator Initiated: Yes (08/10/19 1742)  Set Rate: 15 bmp (08/20/19 0725)  Vt Set: 400 mL (08/20/19 0725)  Pressure Support: 10 cmH20 (08/12/19 0645)  PEEP/CPAP: 5 cmH20 (08/20/19 0725)  Oxygen Concentration (%): 35 (08/20/19 0725)  Peak Airway Pressure: 26 cmH2O (08/20/19 0725)  Plateau Pressure: 24 cmH20 (08/20/19 0725)  Total Ve: 5.78 mL (08/20/19 0725)  F/VT Ratio<105 (RSBI): (!) 46.88 (08/19/19 0434)  Lines/Drains/Airways     Central Venous Catheter Line                 Percutaneous Central Line Insertion/Assessment - triple lumen  08/04/19 1803 left internal jugular 15 days         Hemodialysis Catheter 08/13/19 0400 right internal jugular 7 days          Drain                 Hemodialysis AV Graft Left upper arm -- days         NG/OG Tube 08/11/19 1600 Center mouth 8 days          Airway                 Airway - Non-Surgical 08/10/19 1756 Endotracheal Tube 9 days              Significant Labs:    CBC/Anemia Profile:  Recent Labs   Lab 08/19/19  0417 08/20/19  0347   WBC 18.99* 15.98*   HGB 7.4* 7.1*   HCT 26.9* 25.4*   * 134*   MCV 94 93   RDW 25.0* 24.6*        Chemistries:  Recent Labs   Lab 08/19/19  0417 08/19/19  1200 08/19/19  2205 08/20/19  0347     138  138 140 141 141  141   K 4.4  4.4  4.4 4.5 4.1 4.6  4.6     105  105 107 108 108  108   CO2 24  24  24 21* 24 24  24   BUN 3*  3*   3* 3* 3* 4*  4*   CREATININE 0.5  0.5  0.5 0.5 0.5 0.4*  0.4*   CALCIUM 8.0*  8.0*  8.0* 8.0* 7.9* 7.9*  7.9*   ALBUMIN 2.2*  2.2*  2.2* 2.2* 2.0* 2.0*  2.0*   PROT 6.0  --   --  5.7*   BILITOT 1.0  --   --  0.9   ALKPHOS 105  --   --  97   ALT 15  --   --  13   AST 19  --   --  18   MG 1.9  1.9 2.2 1.9 2.0   PHOS 3.7  3.7  3.7 2.2* 1.8* 1.9*  1.9*       ABGs:   Recent Labs   Lab 08/19/19  0446   PH 7.461*   PCO2 24.6*   HCO3 17.5*   POCSATURATED 99   BE -6       Significant Imaging:  I have reviewed all pertinent imaging results/findings within the past 24 hours.      ABG  Recent Labs   Lab 08/20/19  1139   PH 7.352   PO2 64*   PCO2 43.8   HCO3 24.3   BE -1     Assessment/Plan:     Neuro  Encephalopathy  Mental status stable. On precedex, fentanyl and propofol for sedation. Discontinue precedex   - CTH unremarkable, EEG 8/7 showed encephalopathy    Epilepsy  Continue keppra. EEG showed encephalopathy.     Pulmonary  * Acute on chronic respiratory failure with hypoxia and hypercapnia  - Respiratory failure due to a combination of pulmonary edema secondary to CHF and ESRD and recent pneumonia.  - Remains intubated since admission, attempted extubated on 8/10, but re-intubated for increased work of breathing and tachypnea  - Continue meropenem for klebsiella infection for 14d  - Continue bactrim for PJP prophylaxis  - Antibiotics per ID recs  - Continue CRRT for net negative volume loss        Pneumonia  Course lung sounds and CXR concerning for PNA in LLL with interstitial fullness with leukocytosis without fevers. Respiratory cultures positive. WBC slight uptrend w/ episode of hypothermia yesterday.   - ETT culture from 8/4 + MSSA, PSA and moraxella   - continue bactrim for PJP prophylaxis w/ steroid use   - respiratory cx positive for ESBL klebsiella oxytoca - on meropenem   - AFB, PJP negative   - Blood cultures no growth to date   - continue to trend trend WBC; no fever/hypothermia   - ID signed  off      Pulmonary hypertension  - Likely Group 2 pHTN related to HFrEF. Continue on dialysis   - TTE (8/6/2019): PASP of 50        Cardiac/Vascular  Permanent atrial fibrillation  Patient presents with A fib in the ED. Patient is on chronic warfarin. S/P ablation multiple times.     -Continue heparin gtt  -Pacemaker in place       Acute on chronic systolic congestive heart failure  Patient has hx of HF (EF 35%). CXR in the ED suggestive of HF. BNP 1424 and troponins 0.156.  Adherence to home medication regimen is unclear.  - Repeat TTE EF 25% on 8/11, LVH, G3DD, LAE/JAGUAR, moderate RV systolic dysfunction   - Continue CRRT  - Trial of dobutamine resulted in unsustained PVCs and hypotension requiring an increase in pressor support.     Hypotension  On 10 mg midodrine TID at home.Currently on pressor support.    - Continue midodrine  - levophed/vaso - titrate to map 65  - Pt has been able to maintain BP for CRRT w/ increased pressor support  - Wean pressor support if tolerated      Renal/  ESRD on hemodialysis  - continue CRRT for goal of net neutral I/O, vasopressor requirement increased  - follow nephrology recommendation    Immunology/Multi System  Pulmonary sarcoidosis  - Continue stress dose steroids while in septic shock (since 8/5)  - Started on bactrim per ID with chronic steroid use for PCP prophylaxis    Hematology  Current use of long term anticoagulation  - heparin gtt while IP  - no evidence of hepatic dysfunction on labs - minor bili bump      Endocrine  Type 2 diabetes mellitus, without long-term current use of insulin  - Home medication: linagliptin.   - Continue ISS   - trend glucose POCT    GI  Constipation  - senna and colace    Other  Class 2 severe obesity due to excess calories with serious comorbidity and body mass index (BMI) of 38.0 to 38.9 in adult  Will need to revisit weight loss when improving    Hypoalbuminemia  Currently on tube feeds.  - Temporarily stopped. Will restart  - On renal  novasource     Current chronic use of systemic steroids  - Evidence of PJP ppx on MAR  - Stress dose during septic shock since 8/5; no change in treatment plan today  - Bactrim for PJP prophylaxis    Counseling regarding goals of care  Attempted to discuss goals of care with family on 8/10. Patient's adult children would not like to have this discussion at this time.    On 8/15, pt's son spoke with Dr. Le regarding GOC. He was unsure about making any decisions at this point. Will continue to revisit.    On 8/18, Dr. Le spoke again with pt's son. No concrete decsisions were made, however he seemed believe that his mother would not want to be on long-term dialysis and/or ventilator support (I.e. Not interested in trach). He wants to wait until 8/22-23 to make a concrete decision on what to do regarding changes in care). Consulting palliative care so that family can better understand pt's prognosis and be informed to make decisions regarding future care.     Critical Care Daily Checklist:    A: Awake: RASS Goal/Actual Goal: RASS Goal: -2-->light sedation  Actual: Horton Agitation Sedation Scale (RASS): Light sedation   B: Spontaneous Breathing Trial Performed? Spon. Breathing Trial Initiated?: Initiated (08/20/19 1123)   C: SAT & SBT Coordinated?  SBT                     D: Delirium: CAM-ICU Overall CAM-ICU: Negative   E: Early Mobility Performed? Yes   F: Feeding Goal: Goals: Patient to meet > 85% EEN and EPN by RD follow-up  Status: Nutrition Goal Status: goal not met   Current Diet Order   Procedures    Diet NPO Except for: Medication     Order Specific Question:   Except for     Answer:   Medication      AS: Analgesia/Sedation D/c precedex. Continue with fentanyl and propofol    T: Thromboembolic Prophylaxis Heparin gtt   H: HOB > 300 Yes   U: Stress Ulcer Prophylaxis (if needed) Famotidine   G: Glucose Control SSI   B: Bowel Function Stool Occurrence: 1   I: Indwelling Catheter (Lines & Yepez)  Necessity Trialysis (right IJ), TLC (left IJ), OG   D: De-escalation of Antimicrobials/Pharmacotherapies Complete abx course: meropenem  PJP prophylaxis: bactrim    Plan for the day/ETD Decrease pressor requirements    Code Status:  Family/Goals of Care: Full Code         Critical secondary to Patient has a condition that poses threat to life and bodily function: Severe Respiratory Distress      Critical care was time spent personally by me on the following activities: development of treatment plan with patient or surrogate and bedside caregivers, discussions with consultants, evaluation of patient's response to treatment, examination of patient, ordering and performing treatments and interventions, ordering and review of laboratory studies, ordering and review of radiographic studies, pulse oximetry, re-evaluation of patient's condition. This critical care time did not overlap with that of any other provider or involve time for any procedures.    Plan to be discussed with staff attending.     Elisabeth Munoz MD  Critical Care Medicine  [facility]

## 2019-08-21 NOTE — PROGRESS NOTES
Follow up on skin concerns  Decline in integumentary system suggestive of skin changes at life's end. Reviewed decline in skin and new onset of breakdown with Dr Sujatha Greer with is on critical care service at this time. Despite care, specialty support surface and prevention measures her skin is in failure. Dr Gonzalez with palliative care note appreciated.    Sacral area ulcerations extending to buttock, ischial tuberosities and upper thighs. Areas of purple discoloration noted with peeling ulcerated skin revealing pink - yellow full thickness skin loss.   Intertriginous dermatitis ongoing and will add Interdry [employer] silver wicking textile to manage moisture under pendulous breasts and abdominal pannus. New blisters to left arm, left abdomen , left thigh and a purple demarcating area on right arm.      08/21/19 1015   Oxygen Therapy   Oxygen Concentration (%) 34        Wound 08/14/19 0700 Intertrigo lower Abdomen   Date First Assessed/Time First Assessed: 08/14/19 0700   Pre-existing: Yes  Primary Wound Type: Intertrigo  Side: Right  Orientation: lower  Location: Abdomen   Wound Image    Wound WDL ex   Dressing Appearance Open to air;No dressing;other (see comments)  (antifungal powder)   Drainage Amount Scant   Drainage Characteristics/Odor No odor   Appearance Pink;White;Moist   Tissue loss description Partial thickness   Periwound Area Moist   Wound Edges Open   Wound Length (cm) 0.3 cm   Wound Width (cm) 10 cm   Wound Depth (cm) 0.1 cm   Wound Volume (cm^3) 0.3 cm^3   Wound Surface Area (cm^2) 3 cm^2   Care Cleansed with:;Soap and water;Applied:;Other (see comments)  (antifungal powder)        Wound 08/21/19 1030 Blister(s) upper Arm   Date First Assessed/Time First Assessed: 08/21/19 1030   Pre-existing: No  Primary Wound Type: Blister(s)  Side: Left  Orientation: upper  Location: Arm   Wound Image    Wound WDL ex   Dressing Appearance Open to air;No dressing   Drainage Amount None   Drainage Characteristics/Odor  No odor   Appearance Purple;Blistered   Periwound Area Intact   Wound Length (cm) 4 cm   Wound Width (cm) 1.5 cm   Wound Surface Area (cm^2) 6 cm^2        Wound 08/21/19 1030 Other (comment) distal Arm   Date First Assessed/Time First Assessed: 08/21/19 1030   Pre-existing: No  Primary Wound Type: Other (comment)  Side: Right  Orientation: distal  Location: Arm   Wound Image    Wound WDL ex   Dressing Appearance Open to air;No dressing  (purple discoloration)   Drainage Amount None   Drainage Characteristics/Odor No odor   Appearance Purple;Dry   Tissue loss description Not applicable   Periwound Area Intact   Wound Length (cm) 7 cm   Wound Width (cm) 3 cm   Wound Surface Area (cm^2) 21 cm^2        Wound 08/21/19 1030 Skin Tear Abdomen   Date First Assessed/Time First Assessed: 08/21/19 1030   Pre-existing: No  Primary Wound Type: Skin Tear  Side: Left  Location: Abdomen   Wound Image    Wound WDL ex   Dressing Appearance Open to air;No dressing   Drainage Amount None   Drainage Characteristics/Odor No odor   Appearance Pink   Tissue loss description Partial thickness   Periwound Area Intact   Wound Edges Open   Wound Length (cm) 1 cm   Wound Width (cm) 0.5 cm   Wound Depth (cm) 0.1 cm   Wound Volume (cm^3) 0.05 cm^3   Wound Surface Area (cm^2) 0.5 cm^2   Care Cleansed with:;Sterile normal saline        Wound 08/21/19 1030 Blister(s) Thigh   Date First Assessed/Time First Assessed: 08/21/19 1030   Pre-existing: No  Primary Wound Type: Blister(s)  Side: Left  Location: Thigh   Wound Image    Wound WDL ex   Dressing Appearance No dressing;Open to air   Drainage Amount None   Drainage Characteristics/Odor No odor   Appearance Purple;Blistered   Periwound Area Intact   Wound Length (cm) 2 cm   Wound Width (cm) 1 cm   Wound Surface Area (cm^2) 2 cm^2   Care Cleansed with:;Sterile normal saline        Pressure Injury 08/11/19 1600 midline Sacral spine Stage 2   Date First Assessed/Time First Assessed: 08/11/19 1600    Pressure Injury Present on Admission: (c) yes  Orientation: midline  Location: Sacral spine  Staging: Stage 2   Wound Image    Staging Stage 3  (possible SCALE)   Dressing Appearance Open to air;No dressing   Drainage Amount Copious  (frequent stools)   Appearance Pink;Yellow;Fibrin;Moist   Tissue loss description Full thickness   Red (%), Wound Tissue Color 50 %   Yellow (%), Wound Tissue Color 50 %   Periwound Area Moist;Denuded   Wound Edges Open   Wound Length (cm) 8 cm   Wound Width (cm) 15 cm   Wound Depth (cm) 0.2 cm   Wound Volume (cm^3) 24 cm^3   Wound Surface Area (cm^2) 120 cm^2   Care Cleansed with:;Soap and water;Applied:  (Triad hydorphillic wound dressing)        Pressure Injury 08/15/19 0902 Left lateral Lip Mucosal Membrane   Date First Assessed/Time First Assessed: 08/15/19 0902   Pressure Injury Present on Admission: suspected hospital acquired  Side: Left  Orientation: lateral  Location: Lip  Is this injury device related?: Yes  Staging: Mucosal Membrane   Wound Image    Staging Mucosal Membrane   Dressing Appearance Open to air;No dressing   Drainage Amount None   Drainage Characteristics/Odor No odor   Appearance Moist;Purple   Tissue loss description Partial thickness   Periwound Area Intact   Wound Edges Open   Wound Length (cm) 0.5 cm   Wound Width (cm) 0.8 cm   Wound Surface Area (cm^2) 0.4 cm^2   Care Applied:;Moisturizing agent        Pressure Injury 08/21/19 1030 Right Ischial tuberosity Suspected DTPI   Date First Assessed/Time First Assessed: 08/21/19 1030   Pressure Injury Present on Admission: suspected hospital acquired  Side: Right  Location: Ischial tuberosity  Is this injury device related?: No  Staging: (c) Suspected DTPI   Wound Image    Staging Suspected DTPI   Dressing Appearance Open to air;No dressing;other (see comments)  (barrier creams)   Drainage Characteristics/Odor No odor   Appearance Purple;Pink;Moist  (evolving DTI)   Wound Length (cm) 7 cm   Wound Width (cm) 5 cm    Wound Surface Area (cm^2) 35 cm^2   Care Cleansed with:;Soap and water;Applied:;Skin Barrier  (Triad hydrophillic wound dressing)        Pressure Injury 08/21/19 1030 Left Ischial tuberosity Suspected DTPI   Date First Assessed/Time First Assessed: 08/21/19 1030   Pressure Injury Present on Admission: suspected hospital acquired  Side: Left  Location: Ischial tuberosity  Is this injury device related?: No  Staging: (c) Suspected DTPI   Wound Image   (mixed pressure with moisture wound)   Staging Suspected DTPI   Dressing Appearance Open to air;No dressing;other (see comments)  (barrier creams)   Drainage Amount None   Drainage Characteristics/Odor No odor   Appearance Purple;Pink;Yellow;Moist   Periwound Area Denuded;Moist   Wound Edges Open   Wound Length (cm) 7 cm   Wound Width (cm) 5 cm   Wound Surface Area (cm^2) 35 cm^2   Care Cleansed with:;Soap and water;Applied:;Skin Barrier  (Triad hydrophillic wound dressing)     Lizzeth Figueroa RN CWON  [phone]

## 2019-08-21 NOTE — PROGRESS NOTES
Brief note:   Discussed with ICU team.  No change in patient's status.  Remains minimally responsive.  No family at bedside currently.     Called the pt's son Kenan Pfeiffer [phone].  Kenan is planning on discussing with family this evening.  He will reach out to the teams tomorrow.     Tien Aguillon MD  Palliative Medicine  [phone]

## 2019-08-21 NOTE — ASSESSMENT & PLAN NOTE
Mental status stable. On precedex, fentanyl and propofol for sedation.  - CTH unremarkable, EEG 8/7 showed encephalopathy

## 2019-08-21 NOTE — PLAN OF CARE
Pt intubated/sedated, vent settings AC 30% FiO2/PEEP 5  Follows commands, moves all extremities  Gtts: propofol, fentanyl, levo, heparin  MAP maintained > 65  Anuric, continuous CRRT  Accuchecks Q4h, SSI admin per PRN orders   TF currently @ 10 cc/hr d/t high residuals overnight  Plan of care reviewed with pt   Will continue to monitor

## 2019-08-21 NOTE — ASSESSMENT & PLAN NOTE
- continue CRRT for goal of net negative I/O, vasopressor requirement decreased  - follow nephrology recommendation

## 2019-08-21 NOTE — PROGRESS NOTES
Crrt  Scuff restarted, BFR now increased to 200 to prevent clotting . [id number]/ . uf gaol set now at 350.  Vital stable  B/P 113/78 pulse 69. Patient rermaisn of vent  And sedation

## 2019-08-21 NOTE — PROGRESS NOTES
[facility]  Critical Care Medicine  Progress Note    Patient Name: Sisi Medel  MRN: [id number]  Admission Date: 8/3/2019  Hospital Length of Stay: 18 days  Code Status: Full Code  Attending Provider: Flakita Soto MD  Primary Care Provider: Carlos A Caputo MD   Principal Problem: Acute on chronic respiratory failure with hypoxia and hypercapnia    Subjective:     HPI:  Ms Medel  59 yo f with ESRD on HD, cervical radiculopathy, CHF, Chronic resp failure with hypoxia, T2DM, DDD, paroxysmal Afib, seizure, shingles, thyroid disorder here for a 3 day history of cough and SOB. Patient was at dialysis and had to stop at 1.6L due to resp distress. She was put in a nonrebreather at 12L of oxygen. She was transported via EMS. Overnight she required BiPAP for worsening SOB. Rapid response was called this morning because she was hypotensive 70s/50s. Dr. Rosas, MICU team, went and recheck BP was 107/59. She later became hypertensive again and continued to be on BiPAP, she was stepped up for dialysis, BP monitoring, possible vasopressor requirement and further respiratory monitoring.     During step up she was persistently hypoglycemic and hypotensive, peripheral vascular access was lost and a left TLC was placed at bedside. It was determined she could not tolerated her normal HD and she had tenuous respiratory status, so decision was made to intubate. She has been started on broad spectrum abx, and right HD IJ catheter was placed. Nephro been made aware and SCUF planned for today.     Hospital/ICU Course:  Admitted on 08/03 for what appeared to be heart failure exacerbation. Care escalated to ICU due to her tenuous respirashe was intubated and started on broad spectrum abx. She was unable to tolerate her HD so patient has been on CRRT. In evaluating her AMS, CTH was unremarkable and EEG was consistent with encephalopathy. Patient had worsening leukocytosis and BAL on 8/9 was positive for  klebsiella. She is currently on meropenem. Due to chronic steroid use for her sarcoidosis, she is also on bactrim for PJP prophylaxis.  Frequent SAT results in significant tachypnea and agitation. On 8/10 extubation was attempted but failed so was re-intubated for increased work of breathing and tachypnea.   Pressor requirements have decreased however they still need to be raised when pt becomes more alert. On 8/16 pt had several hours during which temp fell to 94, but had resolved by the morning. On 8/18 pt pressor requirements increased. Dr. Le spoke with son regarding goals of care, in which he voiced that his mother wouldn't want to be on long term ventilator and dialysis support.    Interval History/Significant Events:  Patient did well with propofol and fentanyl for sedation. No acute events overnight    Review of Systems   Unable to perform ROS: Intubated     Objective:     Vital Signs (Most Recent):  Temp: 97.8 °F (36.6 °C) (08/21/19 1100)  Pulse: 70 (08/21/19 1200)  Resp: 15 (08/21/19 0600)  BP: 113/78 (08/21/19 1200)  SpO2: 99 % (08/21/19 1200) Vital Signs (24h Range):  Temp:  [97.6 °F (36.4 °C)-98.8 °F (37.1 °C)] 97.8 °F (36.6 °C)  Pulse:  [65-76] 70  Resp:  [15-25] 15  SpO2:  [88 %-100 %] 99 %  BP: ()/(56-86) 113/78   Weight: 127.5 kg (281 lb)  Body mass index is 42.73 kg/m².      Intake/Output Summary (Last 24 hours) at 8/21/2019 1237  Last data filed at 8/21/2019 1200  Gross per 24 hour   Intake 3376.83 ml   Output 6519 ml   Net -3142.17 ml       Physical Exam   Constitutional: She appears ill. No distress. She is sedated, intubated and restrained.   HENT:   Head: Normocephalic and atraumatic.   Eyes: Conjunctivae are normal. Right eye exhibits no discharge. Left eye exhibits no discharge.   Neck: Neck supple.   trialysis catheter in place. Central line in place   Cardiovascular: Normal rate, normal heart sounds and intact distal pulses.   No murmur heard.  Pulses:       Radial pulses are  2+ on the right side, and 2+ on the left side.   Pulmonary/Chest: Effort normal. No stridor. She is intubated. No respiratory distress. She has no wheezes. She has rales.   Abdominal: Soft. She exhibits no distension.   Musculoskeletal: She exhibits no tenderness.   Neurological: She is alert.   Follows basic commands.   Skin: Skin is warm and dry. She is not diaphoretic. No erythema.   Sacral ulcer    Nursing note and vitals reviewed.      Vents:  Vent Mode: A/C (08/21/19 1126)  Ventilator Initiated: Yes (08/10/19 1742)  Set Rate: 15 bmp (08/21/19 1126)  Vt Set: 400 mL (08/21/19 1126)  Pressure Support: 10 cmH20 (08/20/19 1712)  PEEP/CPAP: 5 cmH20 (08/21/19 1126)  Oxygen Concentration (%): 35 (08/21/19 1200)  Peak Airway Pressure: 38 cmH2O (08/21/19 1126)  Plateau Pressure: 24 cmH20 (08/21/19 1126)  Total Ve: 12.6 mL (08/21/19 1126)  F/VT Ratio<105 (RSBI): (!) 46.88 (08/19/19 0434)  Lines/Drains/Airways     Central Venous Catheter Line                 Percutaneous Central Line Insertion/Assessment - triple lumen  08/04/19 1803 left internal jugular 16 days         Hemodialysis Catheter 08/13/19 0400 right internal jugular 8 days          Drain                 Hemodialysis AV Graft Left upper arm -- days         NG/OG Tube 08/11/19 1600 Center mouth 9 days          Airway                 Airway - Non-Surgical 08/10/19 1756 Endotracheal Tube 10 days              Significant Labs:    CBC/Anemia Profile:  Recent Labs   Lab 08/20/19  0347 08/21/19  0402   WBC 15.98* 14.84*   HGB 7.1* 7.1*   HCT 25.4* 25.9*   * 135*   MCV 93 94   RDW 24.6* 24.3*        Chemistries:  Recent Labs   Lab 08/20/19  0347 08/20/19  1445 08/20/19  2223 08/21/19  0402     141 139 140 141  141   K 4.6  4.6 4.5 4.3 4.4  4.4     108 107 106 106  106   CO2 24  24 21* 23 22*  22*   BUN 4*  4* 4* 3* 4*  4*   CREATININE 0.4*  0.4* 0.5 0.4* 0.5  0.5   CALCIUM 7.9*  7.9* 8.2* 8.2* 8.1*  8.1*   ALBUMIN 2.0*  2.0* 2.1* 2.2*  2.1*  2.1*   PROT 5.7*  --   --  5.7*   BILITOT 0.9  --   --  1.0   ALKPHOS 97  --   --  104   ALT 13  --   --  15   AST 18  --   --  17   MG 2.0 2.0 1.9 2.5   PHOS 1.9*  1.9* 3.8 1.6* 4.3  4.3       ABGs:   Recent Labs   Lab 08/20/19  1139   PH 7.352   PCO2 43.8   HCO3 24.3   POCSATURATED 91*   BE -1       Significant Imaging:  I have reviewed all pertinent imaging results/findings within the past 24 hours.      ABG  Recent Labs   Lab 08/20/19  1139   PH 7.352   PO2 64*   PCO2 43.8   HCO3 24.3   BE -1     Assessment/Plan:     Neuro  Encephalopathy  Mental status stable. On precedex, fentanyl and propofol for sedation.  - CTH unremarkable, EEG 8/7 showed encephalopathy    Epilepsy  Continue keppra. EEG showed encephalopathy.     Pulmonary  * Acute on chronic respiratory failure with hypoxia and hypercapnia  - Respiratory failure due to a combination of pulmonary edema secondary to CHF and ESRD and recent pneumonia.  - Remains intubated since admission, attempted extubated on 8/10, but re-intubated for increased work of breathing and tachypnea  - Continue meropenem for klebsiella infection for 14d  - Continue bactrim for PJP prophylaxis  - Antibiotics per ID recs  - Continue CRRT for net negative volume loss        Pneumonia  Course lung sounds and CXR concerning for PNA in LLL with interstitial fullness with leukocytosis without fevers. Respiratory cultures positive. WBC slight uptrend w/ episode of hypothermia yesterday.   - ETT culture from 8/4 + MSSA, PSA and moraxella   - continue bactrim for PJP prophylaxis w/ steroid use   - respiratory cx positive for ESBL klebsiella oxytoca - on meropenem   - AFB, PJP negative   - Blood cultures no growth to date   - continue to trend trend WBC; no fever/hypothermia   - ID signed off      Pulmonary hypertension  - Likely Group 2 pHTN related to HFrEF. Continue on dialysis   - TTE (8/6/2019): PASP of 50        Cardiac/Vascular  Permanent atrial fibrillation  Patient  presents with A fib in the ED. Patient is on chronic warfarin. S/P ablation multiple times.     -Continue heparin gtt  -Pacemaker in place       Acute on chronic systolic congestive heart failure  Patient has hx of HF (EF 35%). CXR in the ED suggestive of HF. BNP 1424 and troponins 0.156.  Adherence to home medication regimen is unclear.  - Repeat TTE EF 25% on 8/11, LVH, G3DD, LAE/JAGUAR, moderate RV systolic dysfunction   - Continue CRRT  - Trial of dobutamine resulted in unsustained PVCs and hypotension requiring an increase in pressor support.     Hypotension  On 10 mg midodrine TID at home.Currently on pressor support.    - Continue midodrine  - levophed/vaso - titrate to map 65  - Pt has been able to maintain BP for CRRT w/ increased pressor support  - Wean pressor support if tolerated      Renal/  ESRD on hemodialysis  - continue CRRT for goal of net negative I/O, vasopressor requirement decreased  - follow nephrology recommendation    Immunology/Multi System  Pulmonary sarcoidosis  - Continue stress dose steroids while in septic shock (since 8/5)  - Started on bactrim per ID with chronic steroid use for PCP prophylaxis    Hematology  Current use of long term anticoagulation  - heparin gtt while IP  - no evidence of hepatic dysfunction on labs - minor bili bump      Endocrine  Type 2 diabetes mellitus, without long-term current use of insulin  - Home medication: linagliptin.   - Continue ISS   - trend glucose POCT    GI  Constipation  - senna and colace    Other  Class 2 severe obesity due to excess calories with serious comorbidity and body mass index (BMI) of 38.0 to 38.9 in adult  Will need to revisit weight loss when improving    Hypoalbuminemia  Currently on tube feeds.  - On renal novasource     Current chronic use of systemic steroids  - Evidence of PJP ppx on MAR  - Stress dose during septic shock since 8/5; no change in treatment plan today  - Bactrim for PJP prophylaxis    Counseling regarding goals  of care  Attempted to discuss goals of care with family on 8/10. Patient's adult children would not like to have this discussion at this time.    On 8/15, pt's son spoke with Dr. Le regarding GOC. He was unsure about making any decisions at this point. Will continue to revisit.    On 8/18, Dr. Le spoke again with pt's son. No concrete decsisions were made, however he seemed believe that his mother would not want to be on long-term dialysis and/or ventilator support (I.e. Not interested in trach). He wants to wait until 8/22-23 to make a concrete decision on what to do regarding changes in care). Consulting palliative care so that family can better understand pt's prognosis and be informed to make decisions regarding future care.       Critical Care Daily Checklist:    A: Awake: RASS Goal/Actual Goal: RASS Goal: -2-->light sedation  Actual: Horton Agitation Sedation Scale (RASS): Light sedation   B: Spontaneous Breathing Trial Performed? Spon. Breathing Trial Initiated?: Initiated (08/20/19 1123)   C: SAT & SBT Coordinated?  Yes                      D: Delirium: CAM-ICU Overall CAM-ICU: Negative   E: Early Mobility Performed? Yes   F: Feeding Goal: Goals: Patient to meet > 85% EEN and EPN by RD follow-up  Status: Nutrition Goal Status: goal not met   Current Diet Order   Procedures    Diet NPO Except for: Medication     Order Specific Question:   Except for     Answer:   Medication      AS: Analgesia/Sedation Propofol, fentanyl   T: Thromboembolic Prophylaxis Heparin gtt   H: HOB > 300 Yes   U: Stress Ulcer Prophylaxis (if needed) Famotidine   G: Glucose Control Sliding Scale   B: Bowel Function Stool Occurrence: 2   I: Indwelling Catheter (Lines & Yepez) Necessity Trialysis (right IJ), TLC (left IJ), OG   D: De-escalation of Antimicrobials/Pharmacotherapies Complete abx course: meropenem  PJP prophylaxis: bactrim    Plan for the day/ETD Decrease pressor requirements    Code Status:  Family/Goals  of Care: Full Code         Critical secondary to Patient has a condition that poses threat to life and bodily function: Severe Respiratory Distress      Critical care was time spent personally by me on the following activities: development of treatment plan with patient or surrogate and bedside caregivers, discussions with consultants, evaluation of patient's response to treatment, examination of patient, ordering and performing treatments and interventions, ordering and review of laboratory studies, ordering and review of radiographic studies, pulse oximetry, re-evaluation of patient's condition. This critical care time did not overlap with that of any other provider or involve time for any procedures.     Plan to be discussed with staff attending.    Elisabeth Munoz MD  Critical Care Medicine  [facility] [address]

## 2019-08-21 NOTE — SUBJECTIVE & OBJECTIVE
Interval History/Significant Events:  Patient did well with propofol and fentanyl for sedation. No acute events overnight    Review of Systems   Unable to perform ROS: Intubated     Objective:     Vital Signs (Most Recent):  Temp: 97.8 °F (36.6 °C) (08/21/19 1100)  Pulse: 70 (08/21/19 1200)  Resp: 15 (08/21/19 0600)  BP: 113/78 (08/21/19 1200)  SpO2: 99 % (08/21/19 1200) Vital Signs (24h Range):  Temp:  [97.6 °F (36.4 °C)-98.8 °F (37.1 °C)] 97.8 °F (36.6 °C)  Pulse:  [65-76] 70  Resp:  [15-25] 15  SpO2:  [88 %-100 %] 99 %  BP: ()/(56-86) 113/78   Weight: 127.5 kg (281 lb)  Body mass index is 42.73 kg/m².      Intake/Output Summary (Last 24 hours) at 8/21/2019 1237  Last data filed at 8/21/2019 1200  Gross per 24 hour   Intake 3376.83 ml   Output 6519 ml   Net -3142.17 ml       Physical Exam   Constitutional: She appears ill. No distress. She is sedated, intubated and restrained.   HENT:   Head: Normocephalic and atraumatic.   Eyes: Conjunctivae are normal. Right eye exhibits no discharge. Left eye exhibits no discharge.   Neck: Neck supple.   trialysis catheter in place. Central line in place   Cardiovascular: Normal rate, normal heart sounds and intact distal pulses.   No murmur heard.  Pulses:       Radial pulses are 2+ on the right side, and 2+ on the left side.   Pulmonary/Chest: Effort normal. No stridor. She is intubated. No respiratory distress. She has no wheezes. She has rales.   Abdominal: Soft. She exhibits no distension.   Musculoskeletal: She exhibits no tenderness.   Neurological: She is alert.   Follows basic commands.   Skin: Skin is warm and dry. She is not diaphoretic. No erythema.   Sacral ulcer    Nursing note and vitals reviewed.      Vents:  Vent Mode: A/C (08/21/19 1126)  Ventilator Initiated: Yes (08/10/19 1742)  Set Rate: 15 bmp (08/21/19 1126)  Vt Set: 400 mL (08/21/19 1126)  Pressure Support: 10 cmH20 (08/20/19 1712)  PEEP/CPAP: 5 cmH20 (08/21/19 1126)  Oxygen Concentration (%): 35  (08/21/19 1200)  Peak Airway Pressure: 38 cmH2O (08/21/19 1126)  Plateau Pressure: 24 cmH20 (08/21/19 1126)  Total Ve: 12.6 mL (08/21/19 1126)  F/VT Ratio<105 (RSBI): (!) 46.88 (08/19/19 0434)  Lines/Drains/Airways     Central Venous Catheter Line                 Percutaneous Central Line Insertion/Assessment - triple lumen  08/04/19 1803 left internal jugular 16 days         Hemodialysis Catheter 08/13/19 0400 right internal jugular 8 days          Drain                 Hemodialysis AV Graft Left upper arm -- days         NG/OG Tube 08/11/19 1600 Center mouth 9 days          Airway                 Airway - Non-Surgical 08/10/19 1756 Endotracheal Tube 10 days              Significant Labs:    CBC/Anemia Profile:  Recent Labs   Lab 08/20/19  0347 08/21/19  0402   WBC 15.98* 14.84*   HGB 7.1* 7.1*   HCT 25.4* 25.9*   * 135*   MCV 93 94   RDW 24.6* 24.3*        Chemistries:  Recent Labs   Lab 08/20/19  0347 08/20/19  1445 08/20/19  2223 08/21/19  0402     141 139 140 141  141   K 4.6  4.6 4.5 4.3 4.4  4.4     108 107 106 106  106   CO2 24  24 21* 23 22*  22*   BUN 4*  4* 4* 3* 4*  4*   CREATININE 0.4*  0.4* 0.5 0.4* 0.5  0.5   CALCIUM 7.9*  7.9* 8.2* 8.2* 8.1*  8.1*   ALBUMIN 2.0*  2.0* 2.1* 2.2* 2.1*  2.1*   PROT 5.7*  --   --  5.7*   BILITOT 0.9  --   --  1.0   ALKPHOS 97  --   --  104   ALT 13  --   --  15   AST 18  --   --  17   MG 2.0 2.0 1.9 2.5   PHOS 1.9*  1.9* 3.8 1.6* 4.3  4.3       ABGs:   Recent Labs   Lab 08/20/19  1139   PH 7.352   PCO2 43.8   HCO3 24.3   POCSATURATED 91*   BE -1       Significant Imaging:  I have reviewed all pertinent imaging results/findings within the past 24 hours.

## 2019-08-21 NOTE — MEDICAL/APP STUDENT
Patient Name: Sisi Medel  MRN: [id number]  Admission Date: 8/3/2019  Hospital Length of Stay: 16 days  Attending Provider: Edward Le*   Primary Care Physician: Carlos A Caputo MD  Principal Problem:Acute on chronic respiratory failure with hypoxia and hypercapnia    Subjective:   HPI:  Ms Medel  57 yo f with ESRD on HD, cervical radiculopathy, CHF, Chronic resp failure with hypoxia, T2DM, DDD, paroxysmal Afib, seizure, shingles, thyroid disorder here for a 3 day history of cough and SOB. Patient was at dialysis and had to stop at 1.6L due to resp distress. She was put in a nonrebreather at 12L of oxygen. She was transported via EMS. Overnight she required BiPAP for worsening SOB. Rapid response was called this morning because she was hypotensive 70s/50s. Dr. Rosas, MICU team, went and recheck BP was 107/59. She later became hypertensive again and continued to be on BiPAP, she was stepped up for dialysis, BP monitoring, possible vasopressor requirement and further respiratory monitoring.      During step up she was persistently hypoglycemic and hypotensive, peripheral vascular access was lost and a left TLC was placed at bedside. It was determined she could not tolerated her normal HD and she had tenuous respiratory status, so decision was made to intubate. She has been started on broad spectrum abx, and right HD IJ catheter was placed. Nephro been made aware and SCUF planned for today.     Interval History:  Patient seen on SLED this morning at 350ml/hour UF. Still requiring ventilation, pressor support. Ventilation requirement stable, AC/VC Vt 400mL, RR 15, FiO2 35%, PEEP 5, SpO2 100%. Vasopressor requirements stable on levophed and vasopressin. Have been increased while on CRRT, however slightly decreased this morning. Levophed 0.02 mcg/kg/min, Vasopressin 0.04 units/min. Still sedated with fentanyl and propofol, precedex ceased yesterday. Goals of care as per palliative care, d/w son.  For time limited trial of life-prolonging measures, while family comes to terms with medical situation (as per son), to reassess tomorrow, then potentially for comfort cares.    Objective:     Vitals:    08/21/19 1126 08/21/19 1130 08/21/19 1145 08/21/19 1200   BP:  116/82 116/79 113/78   BP Location:    Right arm   Patient Position:    Lying   Pulse: 73 74 70 70   Resp:       Temp:       TempSrc:       SpO2: 100% (!) 88% 100% 99%   Weight:       Height:         Physical Exam   Constitutional: She appears well-developed. She appears ill. She is sedated and intubated.   Cardiovascular: Normal rate, regular rhythm and normal heart sounds.   No murmur heard.  Pulmonary/Chest: She is intubated. She has rales.   Musculoskeletal: She exhibits edema.     Weight: 127.5 kg (281 lb) (08/19/19 0400)  Body mass index is 42.73 kg/m².  Body surface area is 2.47 meters squared.    I/Os last 24hours:  In: 3496.8 (IV 2964.8, , IV piggyback 300)  Out: 7054 (CRRT 7054)  Net: -3557.2mL  Corrected input of CRRT  Net: -1L    Recent Labs     08/20/19  1129 08/20/19  1139   PH 7.357 7.352   PCO2 43.6 43.8   PO2 63* 64*   HCO3 24.4 24.3   POCSATURATED 91* 91*   BE -1 -1     Recent Labs     08/20/19  2223 08/21/19  0402    141  141   K 4.3 4.4  4.4    106  106   CO2 23 22*  22*   BUN 3* 4*  4*   CREATININE 0.4* 0.5  0.5   CALCIUM 8.2* 8.1*  8.1*   PHOS 1.6* 4.3  4.3   MG 1.9 2.5   ALBUMIN 2.2* 2.1*  2.1*     Recent Labs     08/20/19  0347 08/21/19  0402   WBC 15.98* 14.84*   HGB 7.1* 7.1*   HCT 25.4* 25.9*   * 135*     Assessment:   ESRD on hemodialysis  Outpatient HD unit: [facility]   -Nephrologist: Patient does not know  -HD tx days: TThS  -HD tx time: 4hrs 45mins    -HD access: LUE AVG  -HD modality: iHD  -Residual urine: Minimal  -EDW: 109 kg    Plan:   Plan:  - Switch to SCUF, will continue for volume management, as BUN is low, no need for metabolic clearance.  - Target -400 ml/hr, as  long as pressor requirements don't increase/remain stable, as tolerated by BP, keep MAP >65. Titrate drips as indicated.  - Pt on heparin infusion, no need for citrate   - Continue to monitor intake and output, daily weights   - Avoid nephrotoxic medication and renal dose medications to GFR  - Will follow closely

## 2019-08-22 NOTE — ASSESSMENT & PLAN NOTE
Palliative Care Encounter / Goals of care discussion:      Narrative:   Sisi Medel is a 58 y.o. female patient with hypoxic respiratory failure secondary to sarcoidosis and CHF exacerbation on ventilator, polymicrobial sputum cultures, requiring vasopressors and ESRD on CRRT. Continues to require multiple forms of life support.       1: Pain / Physical symptom Assessment:   - pain assesment: No obvious pain   - dyspnea assessment: Intubated, appears comfortable   - anxiety assessment: Appears calm, precedex stopped but still on propofol, fentanyl   - depression assessment: unable to assess    2: Social Background    2 sons, not    Mother is still living   Former section manager at [employer]    3: Palliative care meeting participants:    Son, sisters, mother    4: Goals of care Discussion details:  8/22  Discussion held with numerous family members. Explained current clinical state and lack of progress toward meaningful recovery. They are saddened but prepared for her death and voice that they want to keep her from suffering. A few relatives are not yet present today and they would like to hold off on withdrawing life support until tomorrow. Explained the process and that she will likely die within hours to days of withdrawing life support.      8/20  Patient has previously expressed that she would not want to be on prolonged life support. Son is trying to speak in his mother's interest. He has been very involved in her care for many years going back to his childhood when she first developed medical problems. He had previously discussed with critical care service that a time limited trial would be undertaken and changes to management would be reassessed at the end of this week on Thursday or Friday. He has another brother who just moved back into town, has not been involved in family matters for an extended time.     5: Goals of care Decisions / Recommendations / Plan:  Stop non-essential  medications. Stop CRRT. Continue mechanical ventilation and vasopressors for now.   Family will assemble at noon tomorrow and we will initiate comfort care order set, withdraw vasopressors and ventilation.     6: Follow up plans:    I will follow up with the patient tomorrow.     Thank you for your consult. I will follow along with you. Please call [phone] with questions.

## 2019-08-22 NOTE — PROGRESS NOTES
[facility]  Palliative Medicine  Progress Note    Patient Name: Sisi Medel  MRN: [id number]  Admission Date: 8/3/2019  Hospital Length of Stay: 19 days  Code Status: DNR   Attending Provider: Flakita Soto MD  Consulting Provider: Jeremy Gonzalez MD  Primary Care Physician: Carlos A Caputo MD  Principal Problem:Acute on chronic respiratory failure with hypoxia and hypercapnia    Patient information was obtained from parent, relative(s) and past medical records.      Assessment/Plan:     Palliative care encounter  Palliative Care Encounter / Goals of care discussion:      Narrative:   Sisi Medel is a 58 y.o. female patient with hypoxic respiratory failure secondary to sarcoidosis and CHF exacerbation on ventilator, polymicrobial sputum cultures, requiring vasopressors and ESRD on CRRT. Continues to require multiple forms of life support.       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 Explained current clinical state and lack of progress toward meaningful recovery. They are saddened but prepared for her death and voice that they want to keep her from suffering. A few relatives are not yet present today and they would like to hold off on withdrawing life support until tomorrow. Explained the process and that she will likely die within hours to days of withdrawing life support.      8/20  Patient has previously expressed that she would not want to be on prolonged life support. Son is trying to  "speak in his mother's interest. He has been very involved in her care for many years going back to his childhood when she first developed medical problems. He had previously discussed with critical care service that a time limited trial would be undertaken and changes to management would be reassessed at the end of this week on Thursday or Friday. He has another brother who just moved back into town, has not been involved in family matters for an extended time.     5: Goals of care Decisions / Recommendations / Plan:  Stop non-essential medications. Stop CRRT. Continue mechanical ventilation and vasopressors for now.   Family will assemble at noon tomorrow and we will initiate comfort care order set, withdraw vasopressors and ventilation.     6: Follow up plans:    I will follow up with the patient tomorrow.     Thank you for your consult. I will follow along with you. Please call [phone] with questions.           I will follow-up with patient. Please contact us if you have any additional questions.    Subjective:     Chief Complaint:   Chief Complaint   Patient presents with    Shortness of Breath     Pt c/o SOB after dialysis treatment today. Pt reports productive cough and anxiety. Pt is on 2 lpm of home O2 at all times.        HPI:   58 F with ESRD, pulmonary sarcoidosis and HFrEF was admitted for SOB at dialysis. Initially treated for volume overload with dialysis but respiratory status worsened requiring ICU admission. Further worsened and required intubation. Developed septic shock and has required prolonged vasopressors, mechanical ventilation. Palliative Care is consulted for goals of care discussion with family.     Son at bedside provides history. Patient has been dealing with chronic disease for many years. He describes "multiple angles" and is able to provide a timeline of ESRD, DM2, sarcoid, CHF over decades since his childhood. He has had discussions regarding end of life care in the past with " patient who has related that she would not want long term life support measures. Her health has declined significantly in the preceding months and acute exacerbation requiring hospitalization was not unexpected for them. He reports being designated decision maker by his mother, although he was in the process of getting appropriate documentation and has not been made medical decision maker.     Hospital Course:  No notes on file    Interval History: No significant clinical changes, still requiring ventilatory support, pressors. Family conference in afternoon.     Medications:  Continuous Infusions:   fentanyl 5 mL/hr at 19    norepinephrine bitartrate-D5W 0.04 mcg/kg/min (19)    propofol 10 mcg/kg/min (19)     Scheduled Meds:   chlorhexidine  15 mL Mouth/Throat BID    fludrocortisone  100 mcg Oral Daily    hydrocortisone sodium succinate  100 mg Intravenous Q8H    levetiracetam IVPB  500 mg Intravenous Q12H    midodrine  10 mg Oral TID    valproate sodium (DEPACON) IVPB  250 mg Intravenous Q8H     PRN Meds:albuterol-ipratropium, [] fentaNYL **FOLLOWED BY** fentaNYL, LORazepam, sodium chloride 0.9%, tiZANidine    Objective:     Vital Signs (Most Recent):  Temp: 97.5 °F (36.4 °C) (19)  Pulse: 70 (19)  Resp: 15 (19 0600)  BP: 92/61 (19)  SpO2: 100 % (19) Vital Signs (24h Range):  Temp:  [91 °F (32.8 °C)-97.7 °F (36.5 °C)] 97.5 °F (36.4 °C)  Pulse:  [60-76] 70  SpO2:  [93 %-100 %] 100 %  BP: ()/(50-79) 92/61     Weight: 125.6 kg (277 lb)  Body mass index is 42.12 kg/m².    Review of Symptoms  Unable to assess    Bowel Management Plan (BMP): Yes    Comments: senna-docusate    Pain Assessment: Unable to assess, appears comfortable    OME in 24 hours: approx 350 (continous fentanyl infusion 50-100mcg/hr)    Performance Status: 40    ECOG Performance Status Grade: 3 - Confined to bed or chair 50% of waking  hours    Physical Exam   Constitutional: She appears ill. No distress. She is sedated, intubated and restrained.   HENT:   Head: Normocephalic and atraumatic.   Eyes: Pupils are equal, round, and reactive to light. Conjunctivae and EOM are normal. Right eye exhibits no discharge. Left eye exhibits no discharge.   Neck: Normal range of motion. Neck supple.   Central line in place   Cardiovascular: Normal rate and regular rhythm.   Pulses:       Radial pulses are 2+ on the right side, and 2+ on the left side.   Pulmonary/Chest: She is intubated.   Mechanically ventilated, comfortable appearing   Abdominal: Soft. There is no tenderness.   Musculoskeletal: She exhibits edema. She exhibits no tenderness.   Neurological: She is alert.   Awake and interactive   Skin: Skin is warm and dry. She is not diaphoretic. No erythema.   Nursing note and vitals reviewed.      Significant Labs:   CBC:   Recent Labs   Lab 08/22/19  0300   WBC 11.22   HGB 7.3*   HCT 26.3*   MCV 93   *     BMP:  Recent Labs   Lab 08/22/19  1321   *      K 3.7   *   CO2 17*   BUN 16   CREATININE 1.1   CALCIUM 7.9*   MG 1.9     LFT:  Lab Results   Component Value Date    AST 17 08/22/2019    GGT 54 10/24/2018    ALKPHOS 109 08/22/2019    BILITOT 0.9 08/22/2019     Albumin:   Albumin   Date Value Ref Range Status   08/22/2019 2.2 (L) 3.5 - 5.2 g/dL Final     Protein:   Total Protein   Date Value Ref Range Status   08/22/2019 5.6 (L) 6.0 - 8.4 g/dL Final     Lactic acid:   Lab Results   Component Value Date    LACTATE 3.9 (HH) 08/10/2019    LACTATE 1.2 08/09/2019         Advanced Directives::  Advanced Directives::  Living Will: Yes. Copy on chart: Yes  LaPOST: No  Do Not Resuscitate Status: No  Medical Power of : Yes. Agent's Name: Maikel Negron Agent's Contact Number: [phone]      Decision-Making Capacity: Family answered questions        Living Arrangements: Lives alone     Psychosocial/Cultural:  Patient's most  important priorities:  Quality of life      Patient's biggest concerns/fears:  Not discussed     Previous death/end of life care history:  Per chart review was on hospice in 2014 for CHF.      Patient's goals/hopes:  Not discussed     Spiritual: Yes      F- Fely and Belief: Hinduism     I - Importance: Yes     .  C - Community: Have been in contact with her      A - Address in Care: Yes      > 50% of 70 min visit spent in chart review, face to face discussion of goals of care,  symptom assessment, coordination of care and emotional support.    Jeremy Gonzalez MD  Palliative Medicine  [facility]

## 2019-08-22 NOTE — PLAN OF CARE
"Problem: Adult Inpatient Plan of Care  Goal: Plan of Care Review  Outcome: Ongoing (interventions implemented as appropriate)  Dx: Acute on chronic respiratory failure with hypoxia and hypercapnia    Shift Events: patients temperature dropped to 93 , bear warmer applied. Temp continued to fall to 91 . MD notified    Neuro: Moves All Extremities    Vital Signs: BP 90/61   Pulse 71   Temp (!) 91.6 °F (33.1 °C)   Resp 15   Ht 5' 8" (1.727 m)   Wt 125.6 kg (277 lb)   LMP  (LMP Unknown)   SpO2 100%   Breastfeeding? No   BMI 42.12 kg/m²     Diet: Tube Feeds    Gtts: Propfol, Fentanyl and Heparin    Urine Output: Anuric  cc/     Labs/Accuchecks: accuchecks q4.                 "

## 2019-08-22 NOTE — PROGRESS NOTES
[facility]  Critical Care Medicine  Progress Note    Patient Name: Sisi Medel  MRN: [id number]  Admission Date: 8/3/2019  Hospital Length of Stay: 19 days  Code Status: Full Code  Attending Provider: Flakita Soto MD  Primary Care Provider: Carlos A Caputo MD   Principal Problem: Acute on chronic respiratory failure with hypoxia and hypercapnia    Subjective:     HPI:  Ms Medel  59 yo f with ESRD on HD, cervical radiculopathy, CHF, Chronic resp failure with hypoxia, T2DM, DDD, paroxysmal Afib, seizure, shingles, thyroid disorder here for a 3 day history of cough and SOB. Patient was at dialysis and had to stop at 1.6L due to resp distress. She was put in a nonrebreather at 12L of oxygen. She was transported via EMS. Overnight she required BiPAP for worsening SOB. Rapid response was called this morning because she was hypotensive 70s/50s. Dr. Rosas, MICU team, went and recheck BP was 107/59. She later became hypertensive again and continued to be on BiPAP, she was stepped up for dialysis, BP monitoring, possible vasopressor requirement and further respiratory monitoring.     During step up she was persistently hypoglycemic and hypotensive, peripheral vascular access was lost and a left TLC was placed at bedside. It was determined she could not tolerated her normal HD and she had tenuous respiratory status, so decision was made to intubate. She has been started on broad spectrum abx, and right HD IJ catheter was placed. Nephro been made aware and SCUF planned for today.     Hospital/ICU Course:  Admitted on 08/03 for what appeared to be heart failure exacerbation. Care escalated to ICU due to her tenuous respirashe was intubated and started on broad spectrum abx. She was unable to tolerate her HD so patient has been on CRRT. In evaluating her AMS, CTH was unremarkable and EEG was consistent with encephalopathy. Patient had worsening leukocytosis and BAL on 8/9 was positive for  klebsiella. She is currently on meropenem. Due to chronic steroid use for her sarcoidosis, she is also on bactrim for PJP prophylaxis.  Frequent SAT results in significant tachypnea and agitation. On 8/10 extubation was attempted but failed so was re-intubated for increased work of breathing and tachypnea.   Pressor requirements have decreased however they still need to be raised when pt becomes more alert. On 8/16 pt had several hours during which temp fell to 94, but had resolved by the morning. On 8/18 pt pressor requirements increased. Dr. Le spoke with son regarding goals of care, in which he voiced that his mother wouldn't want to be on long term ventilator and dialysis support.  Ms. Medel continues to require pressor support, each attempt at weaning pressor support results in significant hypotension and MAPS <50.     Interval History/Significant Events:   Attempted to wean pressor support again overnight however hypotension worsened and MAPs decreased to 50s. She was also hypothermic overnight dipping down to the 91. Warming blanket applied without any resolution.   She is on propofol for sedation given that precedex (max dose) was not sufficient.  Family conversations have been ongoing. Palliative care on board, team will discuss Ms. Medel' care this PM    Review of Systems   Unable to perform ROS: Intubated     Objective:     Vital Signs (Most Recent):  Temp: (!) 95.5 °F (35.3 °C) (08/22/19 1200)  Pulse: 65 (08/22/19 1200)  Resp: 15 (08/21/19 0600)  BP: (!) 89/62 (08/22/19 1100)  SpO2: 100 % (08/22/19 1200) Vital Signs (24h Range):  Temp:  [91 °F (32.8 °C)-97.7 °F (36.5 °C)] 95.5 °F (35.3 °C)  Pulse:  [60-76] 65  SpO2:  [93 %-100 %] 100 %  BP: ()/(50-79) 89/62   Weight: 125.6 kg (277 lb)  Body mass index is 42.12 kg/m².      Intake/Output Summary (Last 24 hours) at 8/22/2019 1211  Last data filed at 8/22/2019 1200  Gross per 24 hour   Intake 8299.03 ml   Output 8535 ml   Net -235.97 ml        Physical Exam   Constitutional: She appears lethargic. She appears ill. No distress. She is sedated, intubated and restrained.   HENT:   Head: Normocephalic and atraumatic.   Eyes: Conjunctivae are normal. Right eye exhibits no discharge. Left eye exhibits no discharge.   Neck: Neck supple.   trialysis catheter in place. Central line in place   Cardiovascular: Normal rate, normal heart sounds and intact distal pulses.   No murmur heard.  Pulses:       Radial pulses are 2+ on the right side, and 2+ on the left side.   Pulmonary/Chest: Effort normal. No stridor. She is intubated. No respiratory distress. She has no wheezes. She has rales.   Abdominal: Soft. She exhibits no distension.   Musculoskeletal: She exhibits no tenderness.   Neurological: She appears lethargic.   Follows basic commands.   Skin: Skin is warm and dry. She is not diaphoretic. No erythema.   Sacral ulcer    Nursing note and vitals reviewed.      Vents:  Vent Mode: A/C (08/22/19 1159)  Ventilator Initiated: Yes (08/10/19 1742)  Set Rate: 15 bmp (08/22/19 1159)  Vt Set: 400 mL (08/22/19 1159)  Pressure Support: 10 cmH20 (08/20/19 1712)  PEEP/CPAP: 5 cmH20 (08/22/19 1159)  Oxygen Concentration (%): 30 (08/22/19 1200)  Peak Airway Pressure: 35 cmH2O (08/22/19 1159)  Plateau Pressure: 24 cmH20 (08/22/19 1159)  Total Ve: 5.81 mL (08/22/19 1159)  F/VT Ratio<105 (RSBI): (!) 46.88 (08/19/19 0434)  Lines/Drains/Airways     Central Venous Catheter Line                 Percutaneous Central Line Insertion/Assessment - triple lumen  08/04/19 1803 left internal jugular 17 days         Hemodialysis Catheter 08/13/19 0400 right internal jugular 9 days          Drain                 Hemodialysis AV Graft Left upper arm -- days         NG/OG Tube 08/11/19 1600 Center mouth 10 days          Airway                 Airway - Non-Surgical 08/10/19 1756 Endotracheal Tube 11 days              Significant Labs:    CBC/Anemia Profile:  Recent Labs   Lab 08/21/19  0402  08/22/19  0300   WBC 14.84* 11.22   HGB 7.1* 7.3*   HCT 25.9* 26.3*   * 119*   MCV 94 93   RDW 24.3* 24.3*        Chemistries:  Recent Labs   Lab 08/21/19  0402 08/21/19  1412 08/21/19  2159 08/22/19  0300     141 139 139 141  141   K 4.4  4.4 4.0 3.7 3.5  3.5     106 107 108 110  110   CO2 22*  22* 18* 20* 20*  20*   BUN 4*  4* 8 9 11  11   CREATININE 0.5  0.5 0.6 0.7 0.8  0.8   CALCIUM 8.1*  8.1* 8.0* 8.1* 7.7*  7.7*   ALBUMIN 2.1*  2.1* 2.2* 2.1* 2.0*  2.0*   PROT 5.7*  --   --  5.6*   BILITOT 1.0  --   --  0.9   ALKPHOS 104  --   --  109   ALT 15  --   --  17   AST 17  --   --  17   MG 2.5 2.0 2.0 2.0   PHOS 4.3  4.3 2.5* 3.5 3.8  3.8       Significant Imaging:  I have reviewed and interpreted all pertinent imaging results/findings within the past 24 hours.      ABG  Recent Labs   Lab 08/20/19  1139   PH 7.352   PO2 64*   PCO2 43.8   HCO3 24.3   BE -1     Assessment/Plan:     Neuro  Encephalopathy  Mental status stable. On precedex, fentanyl and propofol for sedation.  - CTH unremarkable, EEG 8/7 showed encephalopathy    Epilepsy  Continue keppra. EEG showed encephalopathy.     Pulmonary  * Acute on chronic respiratory failure with hypoxia and hypercapnia  - Respiratory failure due to a combination of pulmonary edema secondary to CHF and ESRD and recent pneumonia.  - Remains intubated since admission, attempted extubated on 8/10, but re-intubated for increased work of breathing and tachypnea  - Continue meropenem for klebsiella infection for 14d  - Continue bactrim for PJP prophylaxis  - Antibiotics per ID recs  - Continue CRRT for net negative volume loss          Pneumonia  Course lung sounds and CXR concerning for PNA in LLL with interstitial fullness with leukocytosis without fevers. Respiratory cultures positive. WBC slight uptrend w/ episode of hypothermia yesterday.   - ETT culture from 8/4 + MSSA, PSA and moraxella   - continue bactrim for PJP prophylaxis w/ steroid  use   - respiratory cx positive for ESBL klebsiella oxytoca - on meropenem   - AFB, PJP negative   - Blood cultures no growth to date   - continue to trend trend WBC; no fever/hypothermia   - ID signed off      Pulmonary hypertension  - Likely Group 2 pHTN related to HFrEF. Continue on dialysis   - TTE (8/6/2019): PASP of 50        Cardiac/Vascular  Permanent atrial fibrillation  Patient presents with A fib in the ED. Patient is on chronic warfarin. S/P ablation multiple times.     -Continue heparin gtt  -Pacemaker in place       Acute on chronic systolic congestive heart failure  Patient has hx of HF (EF 35%). CXR in the ED suggestive of HF. BNP 1424 and troponins 0.156.  Adherence to home medication regimen is unclear.  - Repeat TTE EF 25% on 8/11, LVH, G3DD, LAE/JAGUAR, moderate RV systolic dysfunction   - Continue CRRT  - Trial of dobutamine resulted in unsustained PVCs and hypotension requiring an increase in pressor support.     Hypotension  On 10 mg midodrine TID at home.Currently on pressor support.    - Continue midodrine  - levophed/vaso - titrate to map 65  - Pt has been able to maintain BP for CRRT w/ increased pressor support  - Wean pressor support if tolerated      Renal/  ESRD on hemodialysis  - continue CRRT for goal of net negative I/O, vasopressor requirement decreased  - follow nephrology recommendation    Immunology/Multi System  Pulmonary sarcoidosis  - Continue stress dose steroids while in septic shock (since 8/5)  - Started on bactrim per ID with chronic steroid use for PCP prophylaxis    Hematology  Current use of long term anticoagulation  - heparin gtt while IP  - no evidence of hepatic dysfunction on labs - minor bili bump      Endocrine  Type 2 diabetes mellitus, without long-term current use of insulin  - Home medication: linagliptin.   - Continue ISS   - trend glucose POCT    GI  Constipation  - senna and colace    Other  Class 2 severe obesity due to excess calories with serious  comorbidity and body mass index (BMI) of 38.0 to 38.9 in adult  Will need to revisit weight loss when improving    Hypoalbuminemia  Currently on tube feeds.  - On renal novasource     Current chronic use of systemic steroids  - Evidence of PJP ppx on MAR  - Stress dose during septic shock since 8/5; no change in treatment plan today  - Bactrim for PJP prophylaxis    Counseling regarding goals of care  Attempted to discuss goals of care with family on 8/10. Patient's adult children would not like to have this discussion at this time.    On 8/15, pt's son spoke with Dr. Le regarding GOC. He was unsure about making any decisions at this point. Will continue to revisit.    On 8/18, Dr. Le spoke again with pt's son. No concrete decsisions were made, however he seemed believe that his mother would not want to be on long-term dialysis and/or ventilator support (I.e. Not interested in trach). He wants to wait until 8/22-23 to make a concrete decision on what to do regarding changes in care). Consulting palliative care so that family can better understand pt's prognosis and be informed to make decisions regarding future care.       Critical Care Daily Checklist:    A: Awake: RASS Goal/Actual Goal: RASS Goal: 0-->alert and calm  Actual: Horton Agitation Sedation Scale (RASS): Light sedation   B: Spontaneous Breathing Trial Performed? Spon. Breathing Trial Initiated?: Initiated (08/20/19 1123)   C: SAT & SBT Coordinated?  Failed                    D: Delirium: CAM-ICU Overall CAM-ICU: Negative   E: Early Mobility Performed?    F: Feeding Goal: Goals: Patient to meet > 85% EEN and EPN by RD follow-up  Status: Nutrition Goal Status: goal not met   Current Diet Order   Procedures    Diet NPO Except for: Medication     Order Specific Question:   Except for     Answer:   Medication      AS: Analgesia/Sedation Propofol   T: Thromboembolic Prophylaxis Heparin gtt   H: HOB > 300 Yes   U: Stress Ulcer Prophylaxis (if  needed) famotidine   G: Glucose Control ISS   B: Bowel Function Stool Occurrence: 2   I: Indwelling Catheter (Lines & Yepez) Necessity Trialysis (right IJ), TLC (left IJ), OG   D: De-escalation of Antimicrobials/Pharmacotherapies Complete abx course: meropenem  PJP prophylaxis: bactrim    Plan for the day/ETD Revisit goals of care discussion    Code Status:  Family/Goals of Care: Full Code         Critical secondary to Patient has a condition that poses threat to life and bodily function: Severe Respiratory Distress      Critical care was time spent personally by me on the following activities: development of treatment plan with patient or surrogate and bedside caregivers, discussions with consultants, evaluation of patient's response to treatment, examination of patient, ordering and performing treatments and interventions, ordering and review of laboratory studies, ordering and review of radiographic studies, pulse oximetry, re-evaluation of patient's condition. This critical care time did not overlap with that of any other provider or involve time for any procedures.     Plan to be discussed with staff attending.    Elisabeth Munoz MD  Critical Care Medicine  [facility]

## 2019-08-22 NOTE — SUBJECTIVE & OBJECTIVE
Interval History: No significant clinical changes, still requiring ventilatory support, pressors. Family conference in afternoon.     Medications:  Continuous Infusions:   fentanyl 5 mL/hr at 19    norepinephrine bitartrate-D5W 0.04 mcg/kg/min (19)    propofol 10 mcg/kg/min (19)     Scheduled Meds:   chlorhexidine  15 mL Mouth/Throat BID    fludrocortisone  100 mcg Oral Daily    hydrocortisone sodium succinate  100 mg Intravenous Q8H    levetiracetam IVPB  500 mg Intravenous Q12H    midodrine  10 mg Oral TID    valproate sodium (DEPACON) IVPB  250 mg Intravenous Q8H     PRN Meds:albuterol-ipratropium, [] fentaNYL **FOLLOWED BY** fentaNYL, LORazepam, sodium chloride 0.9%, tiZANidine    Objective:     Vital Signs (Most Recent):  Temp: 97.5 °F (36.4 °C) (19 1600)  Pulse: 70 (19 1600)  Resp: 15 (19 0600)  BP: 92/61 (19)  SpO2: 100 % (19) Vital Signs (24h Range):  Temp:  [91 °F (32.8 °C)-97.7 °F (36.5 °C)] 97.5 °F (36.4 °C)  Pulse:  [60-76] 70  SpO2:  [93 %-100 %] 100 %  BP: ()/(50-79) 92/61     Weight: 125.6 kg (277 lb)  Body mass index is 42.12 kg/m².    Review of Symptoms  Unable to assess    Bowel Management Plan (BMP): Yes    Comments: senna-docusate    Pain Assessment: Unable to assess, appears comfortable    OME in 24 hours: approx 350 (continous fentanyl infusion 50-100mcg/hr)    Performance Status: 40    ECOG Performance Status Grade: 3 - Confined to bed or chair 50% of waking hours    Physical Exam   Constitutional: She appears ill. No distress. She is sedated, intubated and restrained.   HENT:   Head: Normocephalic and atraumatic.   Eyes: Pupils are equal, round, and reactive to light. Conjunctivae and EOM are normal. Right eye exhibits no discharge. Left eye exhibits no discharge.   Neck: Normal range of motion. Neck supple.   Central line in place   Cardiovascular: Normal rate and regular rhythm.   Pulses:        Radial pulses are 2+ on the right side, and 2+ on the left side.   Pulmonary/Chest: She is intubated.   Mechanically ventilated, comfortable appearing   Abdominal: Soft. There is no tenderness.   Musculoskeletal: She exhibits edema. She exhibits no tenderness.   Neurological: She is alert.   Awake and interactive   Skin: Skin is warm and dry. She is not diaphoretic. No erythema.   Nursing note and vitals reviewed.      Significant Labs:   CBC:   Recent Labs   Lab 08/22/19  0300   WBC 11.22   HGB 7.3*   HCT 26.3*   MCV 93   *     BMP:  Recent Labs   Lab 08/22/19  1321   *      K 3.7   *   CO2 17*   BUN 16   CREATININE 1.1   CALCIUM 7.9*   MG 1.9     LFT:  Lab Results   Component Value Date    AST 17 08/22/2019    GGT 54 10/24/2018    ALKPHOS 109 08/22/2019    BILITOT 0.9 08/22/2019     Albumin:   Albumin   Date Value Ref Range Status   08/22/2019 2.2 (L) 3.5 - 5.2 g/dL Final     Protein:   Total Protein   Date Value Ref Range Status   08/22/2019 5.6 (L) 6.0 - 8.4 g/dL Final     Lactic acid:   Lab Results   Component Value Date    LACTATE 3.9 (HH) 08/10/2019    LACTATE 1.2 08/09/2019         Advanced Directives::  Advanced Directives::  Living Will: Yes. Copy on chart: Yes  LaPOST: No  Do Not Resuscitate Status: No  Medical Power of : Yes. Agent's Name: Maikel Negron Agent's Contact Number: [phone]      Decision-Making Capacity: Family answered questions        Living Arrangements: Lives alone     Psychosocial/Cultural:  Patient's most important priorities:  Quality of life      Patient's biggest concerns/fears:  Not discussed     Previous death/end of life care history:  Per chart review was on hospice in 2014 for CHF.      Patient's goals/hopes:  Not discussed     Spiritual: Yes      F- Fely and Belief: Orthodoxy     I - Importance: Yes     .  C - Community: Have been in contact with her      A - Address in Care: Yes

## 2019-08-22 NOTE — SUBJECTIVE & OBJECTIVE
Interval History/Significant Events:   Attempted to wean pressor support again overnight however hypotension worsened and MAPs decreased to 50s. She was also hypothermic overnight dipping down to the 91. Warming blanket applied without any resolution.   She is on propofol for sedation given that precedex (max dose) was not sufficient.  Family conversations have been ongoing. Palliative care on board, team will discuss Ms. Medel' care this PM    Review of Systems   Unable to perform ROS: Intubated     Objective:     Vital Signs (Most Recent):  Temp: (!) 95.5 °F (35.3 °C) (08/22/19 1200)  Pulse: 65 (08/22/19 1200)  Resp: 15 (08/21/19 0600)  BP: (!) 89/62 (08/22/19 1100)  SpO2: 100 % (08/22/19 1200) Vital Signs (24h Range):  Temp:  [91 °F (32.8 °C)-97.7 °F (36.5 °C)] 95.5 °F (35.3 °C)  Pulse:  [60-76] 65  SpO2:  [93 %-100 %] 100 %  BP: ()/(50-79) 89/62   Weight: 125.6 kg (277 lb)  Body mass index is 42.12 kg/m².      Intake/Output Summary (Last 24 hours) at 8/22/2019 1211  Last data filed at 8/22/2019 1200  Gross per 24 hour   Intake 8299.03 ml   Output 8535 ml   Net -235.97 ml       Physical Exam   Constitutional: She appears lethargic. She appears ill. No distress. She is sedated, intubated and restrained.   HENT:   Head: Normocephalic and atraumatic.   Eyes: Conjunctivae are normal. Right eye exhibits no discharge. Left eye exhibits no discharge.   Neck: Neck supple.   trialysis catheter in place. Central line in place   Cardiovascular: Normal rate, normal heart sounds and intact distal pulses.   No murmur heard.  Pulses:       Radial pulses are 2+ on the right side, and 2+ on the left side.   Pulmonary/Chest: Effort normal. No stridor. She is intubated. No respiratory distress. She has no wheezes. She has rales.   Abdominal: Soft. She exhibits no distension.   Musculoskeletal: She exhibits no tenderness.   Neurological: She appears lethargic.   Follows basic commands.   Skin: Skin is warm and dry. She is  not diaphoretic. No erythema.   Sacral ulcer    Nursing note and vitals reviewed.      Vents:  Vent Mode: A/C (08/22/19 1159)  Ventilator Initiated: Yes (08/10/19 1742)  Set Rate: 15 bmp (08/22/19 1159)  Vt Set: 400 mL (08/22/19 1159)  Pressure Support: 10 cmH20 (08/20/19 1712)  PEEP/CPAP: 5 cmH20 (08/22/19 1159)  Oxygen Concentration (%): 30 (08/22/19 1200)  Peak Airway Pressure: 35 cmH2O (08/22/19 1159)  Plateau Pressure: 24 cmH20 (08/22/19 1159)  Total Ve: 5.81 mL (08/22/19 1159)  F/VT Ratio<105 (RSBI): (!) 46.88 (08/19/19 0434)  Lines/Drains/Airways     Central Venous Catheter Line                 Percutaneous Central Line Insertion/Assessment - triple lumen  08/04/19 1803 left internal jugular 17 days         Hemodialysis Catheter 08/13/19 0400 right internal jugular 9 days          Drain                 Hemodialysis AV Graft Left upper arm -- days         NG/OG Tube 08/11/19 1600 Center mouth 10 days          Airway                 Airway - Non-Surgical 08/10/19 1756 Endotracheal Tube 11 days              Significant Labs:    CBC/Anemia Profile:  Recent Labs   Lab 08/21/19  0402 08/22/19  0300   WBC 14.84* 11.22   HGB 7.1* 7.3*   HCT 25.9* 26.3*   * 119*   MCV 94 93   RDW 24.3* 24.3*        Chemistries:  Recent Labs   Lab 08/21/19  0402 08/21/19  1412 08/21/19  2159 08/22/19  0300     141 139 139 141  141   K 4.4  4.4 4.0 3.7 3.5  3.5     106 107 108 110  110   CO2 22*  22* 18* 20* 20*  20*   BUN 4*  4* 8 9 11  11   CREATININE 0.5  0.5 0.6 0.7 0.8  0.8   CALCIUM 8.1*  8.1* 8.0* 8.1* 7.7*  7.7*   ALBUMIN 2.1*  2.1* 2.2* 2.1* 2.0*  2.0*   PROT 5.7*  --   --  5.6*   BILITOT 1.0  --   --  0.9   ALKPHOS 104  --   --  109   ALT 15  --   --  17   AST 17  --   --  17   MG 2.5 2.0 2.0 2.0   PHOS 4.3  4.3 2.5* 3.5 3.8  3.8       Significant Imaging:  I have reviewed and interpreted all pertinent imaging results/findings within the past 24 hours.

## 2019-08-22 NOTE — MEDICAL/APP STUDENT
Patient Name: Sisi Medel  MRN: [id number]  Admission Date: 8/3/2019  Hospital Length of Stay: 16 days  Attending Provider: Edward Le*   Primary Care Physician: Carlos A Caputo MD  Principal Problem:Acute on chronic respiratory failure with hypoxia and hypercapnia    Subjective:   HPI:  Ms Medel  59 yo f with ESRD on HD, cervical radiculopathy, CHF, Chronic resp failure with hypoxia, T2DM, DDD, paroxysmal Afib, seizure, shingles, thyroid disorder here for a 3 day history of cough and SOB. Patient was at dialysis and had to stop at 1.6L due to resp distress. She was put in a nonrebreather at 12L of oxygen. She was transported via EMS. Overnight she required BiPAP for worsening SOB. Rapid response was called this morning because she was hypotensive 70s/50s. Dr. Rosas, MICU team, went and recheck BP was 107/59. She later became hypertensive again and continued to be on BiPAP, she was stepped up for dialysis, BP monitoring, possible vasopressor requirement and further respiratory monitoring.      During step up she was persistently hypoglycemic and hypotensive, peripheral vascular access was lost and a left TLC was placed at bedside. It was determined she could not tolerated her normal HD and she had tenuous respiratory status, so decision was made to intubate. She has been started on broad spectrum abx, and right HD IJ catheter was placed. Nephro been made aware and SCUF planned for today.     Interval History:  Patient seen on SCUF this morning running at 350ml/hr UF. Has been running since yesterday with no issues. BF was changed to 200 to prevent clotting. Ventilation requirement stable, with FiO2 reduced from 35% to 30% yesterday, SpO2 100%. Vt 400mL, RR15, FiO2 30%, PEEP 5, SpO2 100%. Vasopressin ceased yesterday, and pressor requirements stable at levophed 0.02 mcg/kg/min, with MAP >65. Sedated with fentanyl and propofol, fentanyl ceased overnight, restarted intermittently today. Plan  was for son to have discussion with family last night re goals of care, for decision today as to if transitioning to comfort cares.    Objective:     Vitals:    08/22/19 0645 08/22/19 0700 08/22/19 0727 08/22/19 0909   BP: 90/61 92/62     BP Location:       Patient Position:       Pulse: 71 70 69 75   Resp:       Temp:  (!) 91.8 °F (33.2 °C) (!) 92.3 °F (33.5 °C) (!) 93.7 °F (34.3 °C)   TempSrc:       SpO2: 100% 100% 100% 100%   Weight:       Height:           Physical Exam   Constitutional: She appears ill. She is sedated and intubated.   Cardiovascular: Normal rate, regular rhythm and normal heart sounds.   No murmur heard.  Pulmonary/Chest: She is intubated. No respiratory distress. She has rales.   Musculoskeletal: She exhibits edema.     Weight: 125.6 kg (281 lb) (08/19/19 0400)  Body mass index is 42.12 kg/m².  Body surface area is 2.45 meters squared.    I/Os last 24hours:  In: 6605.2 (IV 6445.2, )  Out: 7505 (CRRT 7405, )  Net: -899.8mL    Recent Labs     08/21/19  0402 08/21/19  1412 08/21/19  2159 08/22/19  0300     141 139 139 141  141   K 4.4  4.4 4.0 3.7 3.5  3.5     106 107 108 110  110   CO2 22*  22* 18* 20* 20*  20*   BUN 4*  4* 8 9 11  11   CREATININE 0.5  0.5 0.6 0.7 0.8  0.8   CALCIUM 8.1*  8.1* 8.0* 8.1* 7.7*  7.7*   PHOS 4.3  4.3 2.5* 3.5 3.8  3.8   MG 2.5 2.0 2.0 2.0   ALBUMIN 2.1*  2.1* 2.2* 2.1* 2.0*  2.0*     Recent Labs     08/20/19  1139   PH 7.352   PCO2 43.8   HCO3 24.3   BE -1     Recent Labs     08/21/19  0402 08/22/19  0300   WBC 14.84* 11.22   HGB 7.1* 7.3*   HCT 25.9* 26.3*   * 119*     Assessment:   ESRD on hemodialysis  Outpatient HD unit: [facility]   -Nephrologist: Patient does not know  -HD tx days: TThS  -HD tx time: 4hrs 45mins    -HD access: LUE AVG  -HD modality: iHD  -Residual urine: Minimal  -EDW: 109 kg    Plan:     Plan:  - Continue SCUF, will continue for volume management, BUN has normalized but not elevated, so  no immediate need for metabolic clearance  - Target -400 ml/hr, as long as pressor requirements don't increase/remain stable, as tolerated by BP, keep MAP >65. Titrate drips as indicated.  - Pt on heparin infusion, no need for citrate   - Continue to monitor intake and output, daily weights   - Avoid nephrotoxic medication and renal dose medications to GFR  - Will follow closely     ATTENDING PHYSICIAN ATTESTATION  I have personally assessed and examined the patient. I thoroughly reviewed the demographic, clinical, laboratorial and imaging information available in medical records. I agree with the assessment and recommendations provided by the medical student who was under my supervision.

## 2019-08-22 NOTE — CARE UPDATE
Family meeting with the palliative care team, Ms. Medel' son, mother and other extended family members. They do not want her suffering and expressed that she wouldn't want to live like this. They would like to wait until tomorrow at noon for withdrawal of life support. Lab draws and CRRT discontinued.     Elisabeth Munoz M.D.   Internal Medicine Resident  Pager #[phone]

## 2019-08-23 NOTE — DISCHARGE SUMMARY
[facility]  Critical Care Medicine  Discharge Summary      Patient Name: Sisi Medel  MRN: [id number]  Admission Date: 8/3/2019  Hospital Length of Stay: 20 days  Discharge Date and Time:  08/23/2019 5:52 PM  Attending Physician: Flakita Soto MD   Discharging Provider: Janet Greer MD  Primary Care Provider: Carlos A Caputo MD    HPI:   Ms Medel  57 yo f with ESRD on HD, cervical radiculopathy, CHF, Chronic resp failure with hypoxia, T2DM, DDD, paroxysmal Afib, seizure, shingles, thyroid disorder here for a 3 day history of cough and SOB. Patient was at dialysis and had to stop at 1.6L due to resp distress. She was put in a nonrebreather at 12L of oxygen. She was transported via EMS. Overnight she required BiPAP for worsening SOB. Rapid response was called this morning because she was hypotensive 70s/50s. Dr. Rosas, MICU team, went and recheck BP was 107/59. She later became hypertensive again and continued to be on BiPAP, she was stepped up for dialysis, BP monitoring, possible vasopressor requirement and further respiratory monitoring.     During step up she was persistently hypoglycemic and hypotensive, peripheral vascular access was lost and a left TLC was placed at bedside. It was determined she could not tolerated her normal HD and she had tenuous respiratory status, so decision was made to intubate. She has been started on broad spectrum abx, and right HD IJ catheter was placed. Nephro been made aware and SCUF planned for today.     * No surgery found *    Indwelling Lines/Drains at Time of Discharge:   Lines/Drains/Airways     Central Venous Catheter Line                 Percutaneous Central Line Insertion/Assessment - triple lumen  08/04/19 1803 left internal jugular 18 days         Hemodialysis Catheter 08/13/19 0400 right internal jugular 10 days          Drain                 Hemodialysis AV Graft Left upper arm -- days          Pressure Ulcer                 Pressure  Injury 08/11/19 1600 midline Sacral spine Stage 2 12 days         Pressure Injury 08/15/19 0902 Left lateral Lip Mucosal Membrane 8 days         Pressure Injury 08/21/19 1030 Left Ischial tuberosity Suspected DTPI 2 days         Pressure Injury 08/21/19 1030 Right Ischial tuberosity Suspected DTPI 2 days              Hospital Course:   Admitted on 08/03 for what appeared to be heart failure exacerbation. Care escalated to ICU due to her tenuous respirashe was intubated and started on broad spectrum abx. She was unable to tolerate her HD so patient has been on CRRT. In evaluating her AMS, CTH was unremarkable and EEG was consistent with encephalopathy. Patient had worsening leukocytosis and BAL on 8/9 was positive for klebsiella. She is currently on meropenem. Due to chronic steroid use for her sarcoidosis, she is also on bactrim for PJP prophylaxis.  Frequent SAT results in significant tachypnea and agitation. On 8/10 extubation was attempted but failed so was re-intubated for increased work of breathing and tachypnea.   Pressor requirements have decreased however they still need to be raised when pt becomes more alert. On 8/16 pt had several hours during which temp fell to 94, but had resolved by the morning. On 8/18 pt pressor requirements increased. Dr. Le spoke with son regarding goals of care, in which he voiced that his mother wouldn't want to be on long term ventilator and dialysis support.  Ms. Medel continues to require pressor support, each attempt at weaning pressor support results in significant hypotension and MAPS <50.  On 8/22 family requested to start comfort care measures with plan to extubate on comfort 8/23    Date : 08/23/2019    Called to see patient for unresponsiveness. On exam the patient did not respond to verbal or physical stimuli. Absent heart and breath sounds . Absent peripheral pulses. Pupils are fixed and dilated. Patient pronounced at 17:09 Next of kin/family notified.  Emotional; support and condolences provided.  called        Consults (From admission, onward)        Status Ordering Provider     Inpatient consult to Critical Care Medicine  Once     Provider:  (Not yet assigned)    Completed DELORES BROCK     Inpatient consult to Infectious Diseases  Once     Provider:  (Not yet assigned)    Completed SAAD SANTOS     Inpatient consult to Infectious Diseases  Once     Provider:  (Not yet assigned)    Completed JOSE CARLOS MOSLEY     Inpatient consult to Nephrology  Once     Provider:  (Not yet assigned)    Completed DELORES BROCK     Inpatient consult to Palliative Care  Once     Provider:  (Not yet assigned)    Completed MALA BENNETT     Pharmacy to dose Vancomycin consult  Once     Provider:  (Not yet assigned)    Completed SARA ROSE        Significant Labs:  All pertinent labs within the past 24 hours have been reviewed.    Significant Imaging:  I have reviewed all pertinent imaging results/findings within the past 24 hours.  I have reviewed and interpreted all pertinent imaging results/findings within the past 24 hours.    Pending Diagnostic Studies:     Procedure Component Value Units Date/Time    Cytology Specimen-Medical Cytology (Fluid/Wash/Brush) [[id number]] Collected:  08/09/19 1623    Order Status:  Sent Lab Status:  No result     Specimen:  Bronch wash with GMS     Lactic acid, plasma [[id number]] Collected:  08/09/19 1122    Order Status:  Sent Lab Status:  No result     Specimen:  Blood     Magnesium [[id number]] Collected:  08/11/19 1400    Order Status:  Sent Lab Status:  In process Updated:  08/11/19 1440    Specimen:  Blood     Potassium - if not ordered in last 24 hours [[id number]] Collected:  08/13/19 1108    Order Status:  Sent Lab Status:  In process Updated:  08/13/19 1108    Specimen:  Blood     Renal function panel [[id number]] Collected:  08/11/19 1400    Order Status:  Sent Lab Status:  In process Updated:   08/11/19 1440    Specimen:  Blood     Troponin I [[id number]] Collected:  08/11/19 0654    Order Status:  Sent Lab Status:  In process Updated:  08/11/19 0654    Specimen:  Blood         Final Active Diagnoses:    Diagnosis Date Noted POA    PRINCIPAL PROBLEM:  Acute on chronic respiratory failure with hypoxia and hypercapnia [J96.21, J96.22] 08/03/2019 Yes    Pressure injury of sacral region, stage 2 [L89.152] 08/14/2019 Yes    Alteration in skin integrity [R23.9] 08/14/2019 Yes    Shock [R57.9]  No    Pneumonia [J18.9]  No    Immunocompromised patient [D84.9]  Yes    Obstructive sleep apnea [G47.33] 08/03/2019 Yes    Current use of long term anticoagulation [Z79.01] 08/03/2019 Not Applicable    CAD (coronary artery disease) [I25.10] 08/03/2019 Yes    Encephalopathy [G93.40]  Yes    ESRD on hemodialysis [N18.6, Z99.2]  Not Applicable    Class 2 severe obesity due to excess calories with serious comorbidity and body mass index (BMI) of 38.0 to 38.9 in adult [E66.01, Z68.38] 03/06/2019 Not Applicable    Type 2 diabetes mellitus, without long-term current use of insulin [E11.9] 12/08/2017 Yes    Permanent atrial fibrillation [I48.2] 06/12/2017 Yes    GERD (gastroesophageal reflux disease) [K21.9] 01/20/2017 Yes    Hypoalbuminemia [E88.09] 11/26/2016 Yes    Current chronic use of systemic steroids [Z79.52] 11/18/2016 Not Applicable    Constipation [K59.00] 09/01/2016 No    Acute on chronic systolic congestive heart failure [I50.23] 03/09/2016 Yes    Hyperlipidemia [E78.5] 03/07/2016 Yes    Pulmonary hypertension [I27.20] 04/07/2015 Yes    Counseling regarding goals of care [Z71.89] 08/06/2014 Not Applicable    Palliative care encounter [Z51.5] 08/01/2014 Not Applicable    Pulmonary sarcoidosis [D86.0] 03/19/2013 Yes    Epilepsy [G40.909] 03/19/2013 Yes    Hypotension [I95.9] 03/19/2013 Yes      Problems Resolved During this Admission:     No new Assessment & Plan notes have been filed under  this hospital service since the last note was generated.  Service: Critical Care Medicine    Discharged Condition:     Disposition: Home or Self Care      Patient Instructions:   No discharge procedures on file.  Medications:  None (patient  at medical facility)       Patient seen and examined this morning. Discussed with Dr. Robertson - staff attestation to follow.      Janet Greer MD  Critical Care Medicine  [facility]

## 2019-08-23 NOTE — ASSESSMENT & PLAN NOTE
Patient has hx of HF (EF 35%). CXR in the ED suggestive of HF. BNP 1424 and troponins 0.156.  Adherence to home medication regimen is unclear.  - Repeat TTE EF 25% on 8/11, LVH, G3DD, LAE/JAGUAR, moderate RV systolic dysfunction   - Continue CRRT  - Trial of dobutamine resulted in unsustained PVCs and hypotension requiring an increase in pressor support.   - comfort care measures initiated

## 2019-08-23 NOTE — PLAN OF CARE
Problem: Adult Inpatient Plan of Care  Goal: Plan of Care Review  Outcome: Ongoing (interventions implemented as appropriate)  No acute events this shift. VSS. Afebrile. Providing comfort care.  Vent settings: AC FiO2 30%, PEEP 5, SpO2 > 95%  Anuric.  Gtts: Levo @ 0.04 mcg/kg/min, Fentanyl @ 100 mcg/hr, Propofol @ 15 mcg/kg/min for sedation/comfort  Plan to extubate today at 1200 for withdrawal of care. NAMITA notified.  All family questions and concerns addressed.

## 2019-08-23 NOTE — PROGRESS NOTES
[facility]  Critical Care Medicine  Progress Note    Patient Name: Sisi Medel  MRN: [id number]  Admission Date: 8/3/2019  Hospital Length of Stay: 20 days  Code Status: DNR  Attending Provider: Flakita Soto MD  Primary Care Provider: Carlos A Caputo MD   Principal Problem: Acute on chronic respiratory failure with hypoxia and hypercapnia    Subjective:     HPI:  Ms Medel  59 yo f with ESRD on HD, cervical radiculopathy, CHF, Chronic resp failure with hypoxia, T2DM, DDD, paroxysmal Afib, seizure, shingles, thyroid disorder here for a 3 day history of cough and SOB. Patient was at dialysis and had to stop at 1.6L due to resp distress. She was put in a nonrebreather at 12L of oxygen. She was transported via EMS. Overnight she required BiPAP for worsening SOB. Rapid response was called this morning because she was hypotensive 70s/50s. Dr. Rosas, MICU team, went and recheck BP was 107/59. She later became hypertensive again and continued to be on BiPAP, she was stepped up for dialysis, BP monitoring, possible vasopressor requirement and further respiratory monitoring.     During step up she was persistently hypoglycemic and hypotensive, peripheral vascular access was lost and a left TLC was placed at bedside. It was determined she could not tolerated her normal HD and she had tenuous respiratory status, so decision was made to intubate. She has been started on broad spectrum abx, and right HD IJ catheter was placed. Nephro been made aware and SCUF planned for today.     Hospital/ICU Course:  Admitted on 08/03 for what appeared to be heart failure exacerbation. Care escalated to ICU due to her tenuous respirashe was intubated and started on broad spectrum abx. She was unable to tolerate her HD so patient has been on CRRT. In evaluating her AMS, CTH was unremarkable and EEG was consistent with encephalopathy. Patient had worsening leukocytosis and BAL on 8/9 was positive for klebsiella.  She is currently on meropenem. Due to chronic steroid use for her sarcoidosis, she is also on bactrim for PJP prophylaxis.  Frequent SAT results in significant tachypnea and agitation. On 8/10 extubation was attempted but failed so was re-intubated for increased work of breathing and tachypnea.   Pressor requirements have decreased however they still need to be raised when pt becomes more alert. On 8/16 pt had several hours during which temp fell to 94, but had resolved by the morning. On 8/18 pt pressor requirements increased. Dr. Le spoke with son regarding goals of care, in which he voiced that his mother wouldn't want to be on long term ventilator and dialysis support.  Ms. Medel continues to require pressor support, each attempt at weaning pressor support results in significant hypotension and MAPS <50.  On 8/22 family requested to start comfort care measures with plan to extubate on comfort 8/23    Interval History/Significant Events: Withdrawal of care initiated last night will extubated and stop pressors today once family ready     Review of Systems  Objective:     Vital Signs (Most Recent):  Temp: 98.6 °F (37 °C) (08/23/19 1150)  Pulse: (!) 56 (08/23/19 1150)  Resp: (!) 23 (08/23/19 1150)  BP: 105/61 (08/23/19 1115)  SpO2: 100 % (08/23/19 1150) Vital Signs (24h Range):  Temp:  [96.3 °F (35.7 °C)-101.8 °F (38.8 °C)] 98.6 °F (37 °C)  Pulse:  [56-79] 56  Resp:  [16-44] 23  SpO2:  [97 %-100 %] 100 %  BP: ()/(53-75) 105/61   Weight: 125.6 kg (277 lb)  Body mass index is 42.12 kg/m².      Intake/Output Summary (Last 24 hours) at 8/23/2019 1301  Last data filed at 8/23/2019 0900  Gross per 24 hour   Intake 1855.6 ml   Output 1050 ml   Net 805.6 ml       Physical Exam   Constitutional: She appears ill. No distress. She is sedated, intubated and restrained.   HENT:   Head: Normocephalic and atraumatic.   Eyes: Pupils are equal, round, and reactive to light. Conjunctivae and EOM are normal. Right eye  exhibits no discharge. Left eye exhibits no discharge.   Neck: Normal range of motion. Neck supple.   Central line in place   Cardiovascular: Normal rate and regular rhythm.   Pulses:       Radial pulses are 2+ on the right side, and 2+ on the left side.   Pulmonary/Chest: She is intubated.   Mechanically ventilated, comfortable appearing   Abdominal: Soft. There is no tenderness.   Musculoskeletal: She exhibits edema. She exhibits no tenderness.   Neurological: She is alert.   Skin: Skin is warm and dry. She is not diaphoretic. No erythema.   Nursing note and vitals reviewed.      Vents:  Vent Mode: A/C (08/23/19 1150)  Ventilator Initiated: Yes (08/10/19 1742)  Set Rate: 15 bmp (08/23/19 1150)  Vt Set: 400 mL (08/23/19 1150)  Pressure Support: 10 cmH20 (08/20/19 1712)  PEEP/CPAP: 5 cmH20 (08/23/19 1150)  Oxygen Concentration (%): 30 (08/23/19 1150)  Peak Airway Pressure: 33 cmH2O (08/23/19 1150)  Plateau Pressure: 24 cmH20 (08/23/19 1150)  Total Ve: 5.83 mL (08/23/19 1150)  F/VT Ratio<105 (RSBI): (!) 59.28 (08/23/19 1150)  Lines/Drains/Airways     Central Venous Catheter Line                 Percutaneous Central Line Insertion/Assessment - triple lumen  08/04/19 1803 left internal jugular 18 days         Hemodialysis Catheter 08/13/19 0400 right internal jugular 10 days          Drain                 Hemodialysis AV Graft Left upper arm -- days         NG/OG Tube 08/11/19 1600 Center mouth 11 days          Airway                 Airway - Non-Surgical 08/10/19 1756 Endotracheal Tube 12 days              Significant Labs:    CBC/Anemia Profile:  Recent Labs   Lab 08/22/19  0300   WBC 11.22   HGB 7.3*   HCT 26.3*   *   MCV 93   RDW 24.3*        Chemistries:  Recent Labs   Lab 08/21/19  2159 08/22/19  0300 08/22/19  1321    141  141 139   K 3.7 3.5  3.5 3.7    110  110 111*   CO2 20* 20*  20* 17*   BUN 9 11  11 16   CREATININE 0.7 0.8  0.8 1.1   CALCIUM 8.1* 7.7*  7.7* 7.9*   ALBUMIN 2.1*  2.0*  2.0* 2.2*   PROT  --  5.6*  --    BILITOT  --  0.9  --    ALKPHOS  --  109  --    ALT  --  17  --    AST  --  17  --    MG 2.0 2.0 1.9   PHOS 3.5 3.8  3.8 4.4       All pertinent labs within the past 24 hours have been reviewed.    Significant Imaging:  I have reviewed all pertinent imaging results/findings within the past 24 hours.  I have reviewed and interpreted all pertinent imaging results/findings within the past 24 hours.      ABG  Recent Labs   Lab 08/20/19  1139   PH 7.352   PO2 64*   PCO2 43.8   HCO3 24.3   BE -1     Assessment/Plan:       * Acute on chronic respiratory failure with hypoxia and hypercapnia  Pulmonary hypertension  Permanent atrial fibrillation  Acute on chronic systolic congestive heart failure  Hypotension  Renal/  ESRD on hemodialysis  Pulmonary sarcoidosis  Current use of long term anticoagulation  Type 2 diabetes mellitus, without long-term current use of insulin  Constipation  Class 2 severe obesity due to excess calories with serious comorbidity and body mass index (BMI) of 38.0 to 38.9 in adult  Hypoalbuminemia  Current chronic use of systemic steroids  - Respiratory failure due to a combination of pulmonary edema secondary to CHF and ESRD and recent pneumonia.  - Remains intubated since admission, attempted extubated on 8/10, but re-intubated for increased work of breathing and tachypnea  - Remains on pressors   - ABX and CCRT stopped and comfort care measures initiated       Counseling regarding goals of care  Attempted to discuss goals of care with family on 8/10. Patient's adult children would not like to have this discussion at this time.    On 8/15, pt's son spoke with Dr. Le regarding GOC. He was unsure about making any decisions at this point. Will continue to revisit.    On 8/18, Dr. Le spoke again with pt's son. No concrete decsisions were made, however he seemed believe that his mother would not want to be on long-term dialysis and/or ventilator  support (I.e. Not interested in trach).     On 8/22 Patient family met with [facility] and palliative care for a long discussion about the multi organ failure that Ms. Sisi Medel is exhibiting. Comfort care measures initiated and patient to be extubated today when family is ready . At the same time pressors will be turned off and she will be given medications for comfort         Patient seen and examined this morning. Discussed with Dr. Robertson  - staff attestation to follow.        Critical care was time spent personally by me on the following activities: development of treatment plan with patient or surrogate and bedside caregivers, discussions with consultants, evaluation of patient's response to treatment, examination of patient, ordering and performing treatments and interventions, ordering and review of laboratory studies, ordering and review of radiographic studies, pulse oximetry, re-evaluation of patient's condition. This critical care time did not overlap with that of any other provider or involve time for any procedures.     Janet Greer MD  Critical Care Medicine  [facility]

## 2019-08-23 NOTE — SIGNIFICANT EVENT
Date : 08/23/2019    Death Note     Called to see patient for unresponsiveness. On exam the patient did not respond to verbal or physical stimuli. Absent heart and breath sounds . Absent peripheral pulses. Pupils are fixed and dilated. Patient pronounced at 17:09 Next of kin/family notified. Emotional; support and condolences provided.  called       Janet Greer MD, MPH  For leers: [email or web address]  [phone]

## 2019-08-23 NOTE — ASSESSMENT & PLAN NOTE
Palliative Care Encounter / Goals of care discussion:      Narrative:   Sisi Medel is a 58 y.o. female patient with hypoxic respiratory failure secondary to sarcoidosis and CHF exacerbation on ventilator, polymicrobial sputum cultures, requiring vasopressors and ESRD on CRRT. Continues to require multiple forms of life support.     Family assembled at bedside today 8/23 and decision made to withdraw life support. Discussed with family in detail and questions answered. Premedicated with fentanyl and ativan and patient was extubated to room air. Family present for extubation. Comfort measures order set initiated with continuous fentanyl infusion with PRN boluses and PRN ativan. Patient appears comfortable post extubation.     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 Their goal is to avoid suffering. Decision made to initiate comfort only measures and withdraw life sustaining therapies.     8/22  Discussion held with numerous family members. Explained current clinical state and lack of progress toward meaningful recovery. They are saddened but prepared for her death and voice that they want to keep her from suffering. A few relatives are not yet present today and they would like to hold off on withdrawing life support until tomorrow. Explained the process and that she will likely die within hours to days of withdrawing life support.      8/20  Patient has previously expressed that she would not want  to be on prolonged life support. Son is trying to speak in his mother's interest. He has been very involved in her care for many years going back to his childhood when she first developed medical problems. He had previously discussed with critical care service that a time limited trial would be undertaken and changes to management would be reassessed at the end of this week on Thursday or Friday. He has another brother who just moved back into town, has not been involved in family matters for an extended time.     5: Goals of care Decisions / Recommendations / Plan:  Stop non-essential medications. Stop CRRT. Continue mechanical ventilation and vasopressors for now.   Family will assemble at noon tomorrow and we will initiate comfort care order set, withdraw vasopressors and ventilation.     6: Follow up plans:    I will follow up with the patient tomorrow.     Thank you for your consult. I will follow along with you. Please call [phone] with questions.

## 2019-08-23 NOTE — PROGRESS NOTES
[facility]  Palliative Medicine  Progress Note    Patient Name: Sisi Medel  MRN: [id number]  Admission Date: 8/3/2019  Hospital Length of Stay: 20 days  Code Status: DNR   Attending Provider: Flakita Soto MD  Consulting Provider: Jeremy Gonzalez MD  Primary Care Physician: Carlos A Caputo MD  Principal Problem:Acute on chronic respiratory failure with hypoxia and hypercapnia    Patient information was obtained from relative(s) and past medical records.      Assessment/Plan:     Palliative care encounter  Palliative Care Encounter / Goals of care discussion:      Narrative:   Sisi Medel is a 58 y.o. female patient with hypoxic respiratory failure secondary to sarcoidosis and CHF exacerbation on ventilator, polymicrobial sputum cultures, requiring vasopressors and ESRD on CRRT. Continues to require multiple forms of life support.     Family assembled at bedside today 8/23 and decision made to withdraw life support. Discussed with family in detail and questions answered. Premedicated with fentanyl and ativan and patient was extubated to room air. Family present for extubation. Comfort measures order set initiated with continuous fentanyl infusion with PRN boluses and PRN ativan. Patient appears comfortable post extubation.     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 Their goal is to avoid suffering. Decision made to initiate comfort only measures and withdraw life  sustaining therapies.     8/22  Discussion held with numerous family members. Explained current clinical state and lack of progress toward meaningful recovery. They are saddened but prepared for her death and voice that they want to keep her from suffering. A few relatives are not yet present today and they would like to hold off on withdrawing life support until tomorrow. Explained the process and that she will likely die within hours to days of withdrawing life support.      8/20  Patient has previously expressed that she would not want to be on prolonged life support. Son is trying to speak in his mother's interest. He has been very involved in her care for many years going back to his childhood when she first developed medical problems. He had previously discussed with critical care service that a time limited trial would be undertaken and changes to management would be reassessed at the end of this week on Thursday or Friday. He has another brother who just moved back into town, has not been involved in family matters for an extended time.     5: Goals of care Decisions / Recommendations / Plan:  Stop non-essential medications. Stop CRRT. Continue mechanical ventilation and vasopressors for now.   Family will assemble at noon tomorrow and we will initiate comfort care order set, withdraw vasopressors and ventilation.     6: Follow up plans:    I will follow up with the patient tomorrow.     Thank you for your consult. I will follow along with you. Please call [phone] with questions.           I will follow-up with patient. Please contact us if you have any additional questions.    Subjective:     Chief Complaint:   Chief Complaint   Patient presents with    Shortness of Breath     Pt c/o SOB after dialysis treatment today. Pt reports productive cough and anxiety. Pt is on 2 lpm of home O2 at all times.        HPI:   58 F with ESRD, pulmonary sarcoidosis and HFrEF was admitted for SOB at dialysis.  "Initially treated for volume overload with dialysis but respiratory status worsened requiring ICU admission. Further worsened and required intubation. Developed septic shock and has required prolonged vasopressors, mechanical ventilation. Palliative Care is consulted for goals of care discussion with family.     Son at bedside provides history. Patient has been dealing with chronic disease for many years. He describes "multiple angles" and is able to provide a timeline of ESRD, DM2, sarcoid, CHF over decades since his childhood. He has had discussions regarding end of life care in the past with patient who has related that she would not want long term life support measures. Her health has declined significantly in the preceding months and acute exacerbation requiring hospitalization was not unexpected for them. He reports being designated decision maker by his mother, although he was in the process of getting appropriate documentation and has not been made medical decision maker.     Hospital Course:  No notes on file    Interval History: Family at bedside today. No clinical status changes.     Medications:  Continuous Infusions:   fentanyl 10 mL/hr at 19 1400     Scheduled Meds:    PRN Meds:[] fentaNYL **FOLLOWED BY** fentaNYL, lorazepam, sodium chloride 0.9%    Objective:     Vital Signs (Most Recent):  Temp: 98.4 °F (36.9 °C) (19 1310)  Pulse: 69 (19 1400)  Resp: (!) 38 (19 1400)  BP: (!) 96/58 (19 1300)  SpO2: (!) 64 % (19 1400) Vital Signs (24h Range):  Temp:  [97 °F (36.1 °C)-101.8 °F (38.8 °C)] 98.4 °F (36.9 °C)  Pulse:  [56-79] 69  Resp:  [16-50] 38  SpO2:  [41 %-100 %] 64 %  BP: ()/(53-75) 96/58     Weight: 125.6 kg (277 lb)  Body mass index is 42.12 kg/m².    Review of Symptoms  appears comfortable    Bowel Management Plan (BMP): No    Comments: stopped    Pain Assessment: Unable to assess, appears comfortable    OME in 24 hours: approx 350 (continous " fentanyl infusion 50-100mcg/hr)    Performance Status: 40    ECOG Performance Status Grade: 3 - Confined to bed or chair 50% of waking hours    Physical Exam   Constitutional: She appears ill. No distress. She is sedated, intubated and restrained.   HENT:   Head: Normocephalic and atraumatic.   Eyes: Pupils are equal, round, and reactive to light. Conjunctivae and EOM are normal. Right eye exhibits no discharge. Left eye exhibits no discharge.   Neck: Normal range of motion. Neck supple.   Central line in place   Cardiovascular: Normal rate and regular rhythm.   Pulses:       Radial pulses are 2+ on the right side, and 2+ on the left side.   Pulmonary/Chest: She is intubated.   Mechanically ventilated, comfortable appearing   Abdominal: Soft. There is no tenderness.   Musculoskeletal: She exhibits edema. She exhibits no tenderness.   Neurological: She is alert.   Sedated   Skin: Skin is warm and dry. She is not diaphoretic. No erythema.   Nursing note and vitals reviewed.      Significant Labs:   CBC:   Recent Labs   Lab 08/22/19  0300   WBC 11.22   HGB 7.3*   HCT 26.3*   MCV 93   *     BMP:  No results for input(s): GLU, NA, K, CL, CO2, BUN, CREATININE, CALCIUM, MG in the last 24 hours.  LFT:  Lab Results   Component Value Date    AST 17 08/22/2019    GGT 54 10/24/2018    ALKPHOS 109 08/22/2019    BILITOT 0.9 08/22/2019     Albumin:   Albumin   Date Value Ref Range Status   08/22/2019 2.2 (L) 3.5 - 5.2 g/dL Final     Protein:   Total Protein   Date Value Ref Range Status   08/22/2019 5.6 (L) 6.0 - 8.4 g/dL Final     Lactic acid:   Lab Results   Component Value Date    LACTATE 3.9 (HH) 08/10/2019    LACTATE 1.2 08/09/2019         Advanced Directives::  Advanced Directives::  Living Will: Yes. Copy on chart: Yes  LaPOST: No  Do Not Resuscitate Status: No  Medical Power of : Yes. Agent's Name: Maikel Negron Agent's Contact Number: [phone]      Decision-Making Capacity: Family answered questions         Living Arrangements: Lives alone     Psychosocial/Cultural:  Patient's most important priorities:  Quality of life      Patient's biggest concerns/fears:  Suffering at end of life     Previous death/end of life care history:  Per chart review was on hospice in 2014 for CHF.      Patient's goals/hopes:  Avoid suffering     Spiritual: Yes      F- Fely and Belief: Faith     I - Importance: Yes     .  C - Community: Have been in contact with her      A - Address in Care: Yes      > 50% of 70 min visit spent in chart review, face to face discussion of goals of care,  symptom assessment, coordination of care and emotional support.    Jeremy Gonzalez MD  Palliative Medicine  [facility]

## 2019-08-23 NOTE — SUBJECTIVE & OBJECTIVE
Interval History: Family at bedside today. No clinical status changes.     Medications:  Continuous Infusions:   fentanyl 10 mL/hr at 19 1400     Scheduled Meds:    PRN Meds:[] fentaNYL **FOLLOWED BY** fentaNYL, lorazepam, sodium chloride 0.9%    Objective:     Vital Signs (Most Recent):  Temp: 98.4 °F (36.9 °C) (19 1310)  Pulse: 69 (19 1400)  Resp: (!) 38 (19 1400)  BP: (!) 96/58 (19 1300)  SpO2: (!) 64 % (19 1400) Vital Signs (24h Range):  Temp:  [97 °F (36.1 °C)-101.8 °F (38.8 °C)] 98.4 °F (36.9 °C)  Pulse:  [56-79] 69  Resp:  [16-50] 38  SpO2:  [41 %-100 %] 64 %  BP: ()/(53-75) 96/58     Weight: 125.6 kg (277 lb)  Body mass index is 42.12 kg/m².    Review of Symptoms  appears comfortable    Bowel Management Plan (BMP): No    Comments: stopped    Pain Assessment: Unable to assess, appears comfortable    OME in 24 hours: approx 350 (continous fentanyl infusion 50-100mcg/hr)    Performance Status: 40    ECOG Performance Status Grade: 3 - Confined to bed or chair 50% of waking hours    Physical Exam   Constitutional: She appears ill. No distress. She is sedated, intubated and restrained.   HENT:   Head: Normocephalic and atraumatic.   Eyes: Pupils are equal, round, and reactive to light. Conjunctivae and EOM are normal. Right eye exhibits no discharge. Left eye exhibits no discharge.   Neck: Normal range of motion. Neck supple.   Central line in place   Cardiovascular: Normal rate and regular rhythm.   Pulses:       Radial pulses are 2+ on the right side, and 2+ on the left side.   Pulmonary/Chest: She is intubated.   Mechanically ventilated, comfortable appearing   Abdominal: Soft. There is no tenderness.   Musculoskeletal: She exhibits edema. She exhibits no tenderness.   Neurological: She is alert.   Sedated   Skin: Skin is warm and dry. She is not diaphoretic. No erythema.   Nursing note and vitals reviewed.      Significant Labs:   CBC:   Recent Labs   Lab  08/22/19  0300   WBC 11.22   HGB 7.3*   HCT 26.3*   MCV 93   *     BMP:  No results for input(s): GLU, NA, K, CL, CO2, BUN, CREATININE, CALCIUM, MG in the last 24 hours.  LFT:  Lab Results   Component Value Date    AST 17 08/22/2019    GGT 54 10/24/2018    ALKPHOS 109 08/22/2019    BILITOT 0.9 08/22/2019     Albumin:   Albumin   Date Value Ref Range Status   08/22/2019 2.2 (L) 3.5 - 5.2 g/dL Final     Protein:   Total Protein   Date Value Ref Range Status   08/22/2019 5.6 (L) 6.0 - 8.4 g/dL Final     Lactic acid:   Lab Results   Component Value Date    LACTATE 3.9 (HH) 08/10/2019    LACTATE 1.2 08/09/2019         Advanced Directives::  Advanced Directives::  Living Will: Yes. Copy on chart: Yes  LaPOST: No  Do Not Resuscitate Status: No  Medical Power of : Yes. Agent's Name: Maikel Negron Agent's Contact Number: [phone]      Decision-Making Capacity: Family answered questions        Living Arrangements: Lives alone     Psychosocial/Cultural:  Patient's most important priorities:  Quality of life      Patient's biggest concerns/fears:  Suffering at end of life     Previous death/end of life care history:  Per chart review was on hospice in 2014 for CHF.      Patient's goals/hopes:  Avoid suffering     Spiritual: Yes      F- Fely and Belief: Anabaptism     I - Importance: Yes     .  C - Community: Have been in contact with her      A - Address in Care: Yes

## 2019-08-23 NOTE — SUBJECTIVE & OBJECTIVE
Interval History/Significant Events: Withdrawal of care initiated last night will extubated and stop pressors today once family ready     Review of Systems  Objective:     Vital Signs (Most Recent):  Temp: 98.6 °F (37 °C) (08/23/19 1150)  Pulse: (!) 56 (08/23/19 1150)  Resp: (!) 23 (08/23/19 1150)  BP: 105/61 (08/23/19 1115)  SpO2: 100 % (08/23/19 1150) Vital Signs (24h Range):  Temp:  [96.3 °F (35.7 °C)-101.8 °F (38.8 °C)] 98.6 °F (37 °C)  Pulse:  [56-79] 56  Resp:  [16-44] 23  SpO2:  [97 %-100 %] 100 %  BP: ()/(53-75) 105/61   Weight: 125.6 kg (277 lb)  Body mass index is 42.12 kg/m².      Intake/Output Summary (Last 24 hours) at 8/23/2019 1301  Last data filed at 8/23/2019 0900  Gross per 24 hour   Intake 1855.6 ml   Output 1050 ml   Net 805.6 ml       Physical Exam   Constitutional: She appears ill. No distress. She is sedated, intubated and restrained.   HENT:   Head: Normocephalic and atraumatic.   Eyes: Pupils are equal, round, and reactive to light. Conjunctivae and EOM are normal. Right eye exhibits no discharge. Left eye exhibits no discharge.   Neck: Normal range of motion. Neck supple.   Central line in place   Cardiovascular: Normal rate and regular rhythm.   Pulses:       Radial pulses are 2+ on the right side, and 2+ on the left side.   Pulmonary/Chest: She is intubated.   Mechanically ventilated, comfortable appearing   Abdominal: Soft. There is no tenderness.   Musculoskeletal: She exhibits edema. She exhibits no tenderness.   Neurological: She is alert.   Skin: Skin is warm and dry. She is not diaphoretic. No erythema.   Nursing note and vitals reviewed.      Vents:  Vent Mode: A/C (08/23/19 1150)  Ventilator Initiated: Yes (08/10/19 1742)  Set Rate: 15 bmp (08/23/19 1150)  Vt Set: 400 mL (08/23/19 1150)  Pressure Support: 10 cmH20 (08/20/19 1712)  PEEP/CPAP: 5 cmH20 (08/23/19 1150)  Oxygen Concentration (%): 30 (08/23/19 1150)  Peak Airway Pressure: 33 cmH2O (08/23/19 1150)  Plateau  Pressure: 24 cmH20 (08/23/19 1150)  Total Ve: 5.83 mL (08/23/19 1150)  F/VT Ratio<105 (RSBI): (!) 59.28 (08/23/19 1150)  Lines/Drains/Airways     Central Venous Catheter Line                 Percutaneous Central Line Insertion/Assessment - triple lumen  08/04/19 1803 left internal jugular 18 days         Hemodialysis Catheter 08/13/19 0400 right internal jugular 10 days          Drain                 Hemodialysis AV Graft Left upper arm -- days         NG/OG Tube 08/11/19 1600 Center mouth 11 days          Airway                 Airway - Non-Surgical 08/10/19 1756 Endotracheal Tube 12 days              Significant Labs:    CBC/Anemia Profile:  Recent Labs   Lab 08/22/19  0300   WBC 11.22   HGB 7.3*   HCT 26.3*   *   MCV 93   RDW 24.3*        Chemistries:  Recent Labs   Lab 08/21/19  2159 08/22/19  0300 08/22/19  1321    141  141 139   K 3.7 3.5  3.5 3.7    110  110 111*   CO2 20* 20*  20* 17*   BUN 9 11  11 16   CREATININE 0.7 0.8  0.8 1.1   CALCIUM 8.1* 7.7*  7.7* 7.9*   ALBUMIN 2.1* 2.0*  2.0* 2.2*   PROT  --  5.6*  --    BILITOT  --  0.9  --    ALKPHOS  --  109  --    ALT  --  17  --    AST  --  17  --    MG 2.0 2.0 1.9   PHOS 3.5 3.8  3.8 4.4       All pertinent labs within the past 24 hours have been reviewed.    Significant Imaging:  I have reviewed all pertinent imaging results/findings within the past 24 hours.  I have reviewed and interpreted all pertinent imaging results/findings within the past 24 hours.

## 2019-08-23 NOTE — PLAN OF CARE
CM discussed patient D/C POC needs with CCM team in IDT rounds. Per CCM team, patient is not medically stable for stepdown. Patient continues to require inubation/ventilator setting changes, pressor support (each attempt at weaning pressor support results in significant hypotension and MAPS <50).      On 8/22 family requested to start comfort care measures with plan to terminally extubate on 8/23/19.  Withdrawal of care initiated last night. CCM team will extubate and stop pressors today once family ready.    CM remains available for any family needs or concerns.        08/23/19 1334   Discharge Reassessment   Assessment Type Discharge Planning Reassessment   Provided patient/caregiver education on the expected discharge date and the discharge plan No   Do you have any problems affording any of your prescribed medications? No   Discharge Plan A Other  (Terminal Extubation and Withdrawl of Care)   DME Needed Upon Discharge  none   Post-Acute Status   Discharge Delays (!) Patient and Family Barriers       Vanessa Wharton RN, [facility]  Case Management-Critical Care     [phone]

## 2019-08-23 NOTE — PROGRESS NOTES
Plans for withdrawal of life support. Family wishes to pursue comfort care measures. Family no longer wishes to continue RRT.      Nephrology will sign-off. Please re-consult us if anything changes. Thank you for allowing us to assist in the care of this patient.     AYAN Levine, AGNP-C  Nephrology  Pager: [phone]

## 2019-08-23 NOTE — PROGRESS NOTES
1300 - Pt extubated to room air; OGT and esophageal temp probe removed as well. Family at bedside. Palliative care MDs at bedside, CC team aware of plan. Comfort care in process. WCANAYELI.

## 2019-08-23 NOTE — ASSESSMENT & PLAN NOTE
- Respiratory failure due to a combination of pulmonary edema secondary to CHF and ESRD and recent pneumonia.  - Remains intubated since admission, attempted extubated on 8/10, but re-intubated for increased work of breathing and tachypnea  - ABX and CCRT stopped and comfort care measures initiated

## 2019-08-24 NOTE — PROGRESS NOTES
Pt pronounced dead at 1707 by Dr. Greer. Palliative care also at bedside.  and NAMITA notified. Pt's family at bedside.

## 2019-08-26 NOTE — PLAN OF CARE
CM notified in morning updates that patient  on Friday evening. Palliative care was present with family at bedside.  and NAMITA notified.     Time of Death: 17:07pm on 19    Preliminary Cause of Death: Acute on Chronic Respiratory Failure with Hypoxia and Hypercapnia        19 0854   Final Note   Assessment Type Final Discharge Note   Anticipated Discharge Disposition      Vanessa Wharton RN, [facility]  Case Management-Critical Care     [phone]

## 2019-08-27 NOTE — PHYSICIAN QUERY
PT Name: Sisi Medel  MR #: [id number]     CDS/: Syeda Umaña RN               Contact information: [phone]  This form is a permanent document in the medical record.     Query Date: August 27, 2019    Physician Query - Neurological Condition Clarification    By submitting this query, we are merely seeking further clarification of documentation to reflect the severity of illness of your patient. Please utilize your independent clinical judgment when addressing the question(s) below.    The Medical record reflects the following:     Indicators   Supporting Clinical Findings Location in Medical Record   x AMS, Confusion,  LOC, etc.  In evaluating her AMS, CTH was unremarkable and EEG was consistent with encephalopathy    She appears lethagic   8/23 Hosp med PN   x Acute / Chronic Illness Acute on chronic respiratory failure and hypercapnia  Shock  Pneumonia Obstructive sleep apnea  ESRD on HD  Acute on chronic systolic congestive heart failure  Pulmonary sarcoidosis  Epilepsy 8/23 DC summary   x Radiology Findings Unremarkable noncontrast CT head as detailed above specifically without evidence for acute intracranial hemorrhage.  Clinical correlation and further evaluation as warranted.    Patchy paranasal sinus opacities most pronounced in the left ethmoid air cells and left maxillary antra as detailed above with questioned sphenoid and maxillary air-fluid levels cannot exclude acute sinusitis.   8/7 CT head   x Electrolyte Imbalance Na 128, 128, 125, 131  K= 5.6, 5.6, 5.3 8/3-8/12 Lab   x Medication Norepinephrine gtt  Vasopressin gtt  Piperacillin IVPB  Meropenem IVPB  Lorazepam 1mg IVP x4 doses  Lorazepam 0.5mg   8/6 MAR  8/6-21 MAR  8/4-10 MAR  8/13-22 MAR  8/23 MAR  8/15-16 MAR   x Treatment         EEG  CT head  ICU   Cardiac monitoring  Mechanical ventilation  Continuous oxygen  Respiratory culture     8/3- 8/22 NSG orders   x Other   This is an abnormal EEG during comatose state.  Diffuse  slowing of the background along with periods of suppression were noted.  Variability was   present.  Reactivity was absent.         POC PH 7.366   POC PCO2 58.7 (HH)   POC PO2 77 (L)   POC BE 8   POC HCO3 33.6 (H)   POC SATURATED O2 94 (L)          8/7 EEG              8/3 ABGs     Encephalopathy- is a general term for any diffuse disease of the brain that alters brain function or structure. Treatment of the cognitive dysfunction varies but is ultimately dependent on the treatment of the underlying condition.    Major Symptoms of Encephalopathy - Decreased level of consciousness, fluctuating alertness/concentration, confusion, agitation, lethargy, somnolence, drowsiness, obtundation, stupor, or coma.         References: National Institutes of Healths (NIH) National [place] of Neurological Disorders and Strokes;  HCPro 2016; Advisory Board     Clinical Guidelines:   These guidelines will set system standards to assist providers in managing, documentation, and coding of encephalopathy. The intent of this document is to serve as a system guideline, not replace the providers clinical judgment:      Provider, please specify the diagnosis or diagnoses associated with above clinical findings.    Please further specify type of Encephalopathy:    [   ] Anoxic/Hypoxic Encephalopathy- Brain damage due to anoxia/hypoxia   [  x ] Metabolic Encephalopathy - Due to electrolye imbalance, metabolic derangements, or infections processes, includes Septic Encephalopathy   [   ] Other Encephalopathy - Includes uremic encephalopathy   [   ] Unspecified Encephalopathy      [   ] Other Neurological Condition-  Includes Post-ictal altered mental status. (please specify condition): _________   [   ]  Clinically Undetermined     Please document in your progress notes daily for the duration of treatment until resolved, and include in your discharge summary.

## 2019-09-10 LAB
FUNGUS SPEC CULT: ABNORMAL
FUNGUS SPEC CULT: NORMAL

## 2019-10-11 LAB
ACID FAST MOD KINY STN SPEC: NORMAL
MYCOBACTERIUM SPEC QL CULT: NORMAL

## 2020-05-01 NOTE — ASSESSMENT & PLAN NOTE
Echo 3/11/19: EF 35% with diastolic dysfunction    Home meds: aspirin, atorvastatin 80mg    - not on GDMT on admission  - BP too low chronically for ACE, BB, etc      Health Maintenance Due   Topic Date Due   • Pneumococcal Vaccine 0-64 (1 of 1 - PPSV23) 05/19/1966   • Colorectal Cancer Screening-Fecal Occult Blood  05/19/2010   • Shingles Vaccine (1 of 2) 05/19/2010   • Lung Cancer Screening  05/19/2015       Patient is due for topics as listed above but is not proceeding with Immunization(s) Pneumococcal and Shingles, Colorectal Cancer Screening: iFOBT and Lung Cancer Screening at this time. Patient declines at this time

## 2020-07-28 NOTE — OP NOTE
Brief Operative Note  Date: 08/22/2018    Surgeon(s) and Role:     * Torrey Villafana MD - Primary    Pre-op Diagnosis:  Stenosis of arteriovenous dialysis fistula, subsequent encounter [T84.159D];     Post-op Diagnosis:  Same    Procedure(s):  1) PTA venous outflow, LUE AVG 8 x 40 [employer]   2) PTA venous outflow, LUE AVG 10 x 40 [employer]   3) LUE fistulagram    Surgeon: Torrey Villafana MD  Vascular & Endovascular Surgery     Anesthesia: General/MAC    Findings/Key Components:  Successful treatment of venous outflow stenosis    EBL: Minimal         Specimens (From admission, onward)    None          I attest to being present for the procedure and performing the case.  Torrey Villafana MD  Vascular & Endovascular Surgery     Discharge Note  SUMMARY    Admit Date: 8/22/2018    Attending Physician: Torrey Villafana MD  Vascular & Endovascular Surgery       Discharge Physician: Torrey Villafana MD  Vascular & Endovascular Surgery       Discharge Date: 08/22/2018    Final Diagnosis: Stenosis of arteriovenous dialysis fistula, subsequent encounter [T82.920D]    Disposition: Home or self-care    Patient Instructions:   Discharge Medication List as of 8/22/2018  1:35 PM      CONTINUE these medications which have NOT CHANGED    Details   ACZONE 5 % topical gel Apply to face every morning, Normal      atorvastatin (LIPITOR) 80 MG tablet TAKE 1 TABLET(80 MG) BY MOUTH EVERY NIGHT, Normal      betamethasone dipropionate (DIPROLENE) 0.05 % ointment AAA bid hand under occlusion, Normal      cholecalciferol, vitamin D3, 1,000 unit capsule Take 1 capsule (1,000 Units total) by mouth once daily., Starting 3/8/2017, Until Discontinued, No Print      clopidogrel (PLAVIX) 75 mg tablet Take 1 tablet (75 mg total) by mouth once daily., Starting Mon 3/26/2018, Normal      colchicine 0.6 mg tablet Take 1 tablet (0.6 mg total) by mouth once., Starting Wed 5/16/2018, Normal      gabapentin (NEURONTIN) 600 MG tablet Take 1 tablet (600 mg  total) by mouth 2 (two) times daily., Starting Mon 5/7/2018, Until Tue 5/7/2019, Normal      ketorolac 0.5% (ACULAR) 0.5 % Drop instill one drop into both eyes every morning, Historical Med      levETIRAcetam (KEPPRA) 500 MG Tab Take 1 tablet (500 mg total) by mouth 2 (two) times daily., Starting Tue 12/19/2017, Normal      linagliptin (TRADJENTA) 5 mg Tab tablet Take 1 tablet (5 mg total) by mouth once daily., Starting Fri 7/27/2018, Until Sat 7/27/2019, Normal      midodrine (PROAMATINE) 5 MG Tab Starting Tue 5/29/2018, Historical Med      nortriptyline (PAMELOR) 25 MG capsule Take 1 capsule (25 mg total) by mouth every evening., Starting Wed 8/15/2018, Normal      omeprazole (PRILOSEC) 20 MG capsule TAKE 1 CAPSULE BY MOUTH EVERY DAY, Normal      polyethylene glycol (GLYCOLAX) 17 gram/dose powder Take 17 g by mouth once daily. Dissolve in juice., Starting 2/11/2017, Until Discontinued, Normal      prednisoLONE acetate (PRED FORTE) 1 % DrpS INSTILL ONE DROP INTO  LEFT EYE THREE TIMES A DAY, Historical Med      !! predniSONE (DELTASONE) 10 MG tablet TK 1 T PO ONCE D, Historical Med      DENISE-LINDA 0.8 mg Tab TK 1 T PO QD, Historical Med      RENVELA 800 mg Tab TAKE 1 TABLET BY MOUTH THREE TIMES DAILY WITH MEALS, Normal      tiZANidine (ZANAFLEX) 4 MG tablet 1 tablet by mouth every 8-12 hours as needed for muscle spasm, Normal      blood sugar diagnostic Strp 1 strip by Misc.(Non-Drug; Combo Route) route 2 (two) times daily with meals., Starting Thu 2/1/2018, Print      blood-glucose meter kit Use as instructed, Print      diclofenac sodium (SOLARAZE) 3 % gel Historical Med      fludrocortisone (FLORINEF) 0.1 mg Tab Take 100 mcg by mouth 2 (two) times daily., Until Discontinued, Historical Med      lancets Misc 1 lancet by Misc.(Non-Drug; Combo Route) route 2 (two) times daily with meals., Starting Thu 2/1/2018, Print      lancing device Misc 1 Device by Misc.(Non-Drug; Combo Route) route 2 (two) times daily with  meals., Starting Thu 2/1/2018, Until Fri 2/1/2019, Print      mupirocin (BACTROBAN) 2 % ointment Apply topically once daily. To areas of infection/inflammation on leg, Starting Fri 7/27/2018, Normal      !! predniSONE (DELTASONE) 10 MG tablet Take 2 tablets (20 mg total) by mouth once daily. Take with food.  After 10 days resume 10 mg daily dose., Starting Mon 6/18/2018, Normal      PREVNAR 13, PF, 0.5 mL Syrg ADM 0.5ML IM UTD, Historical Med      sodium polystyrene (KAYEXALATE) 15 gram/60 mL Susp Take 60 mLs (15 g total) by mouth daily as needed. Take when directed by your doctor, Starting Wed 8/15/2018, Normal      !! warfarin (COUMADIN) 5 MG tablet Take 1 tablet (5 mg total) by mouth Daily. Per [facility], Starting 4/3/2017, Until Discontinued, Normal      !! warfarin (COUMADIN) 5 MG tablet TAKE 1 TABLET BY MOUTH EVERY DAY EXCEPT TAKE 1 1/2 ON MONDAY AND FRIDAY OR AS DIRECTED BY CLINIC, Normal      !! warfarin (COUMADIN) 5 MG tablet TAKE 1 TABLET BY MOUTH EVERY DAY EXCEPT TAKE 1 1/2 ON MONDAY AND FRIDAY OR AS DIRECTED BY CLINIC, Normal       !! - Potential duplicate medications found. Please discuss with provider.          Diet:  Resume pre-operative diet    Activity:  Ad rosa    Follow-up:  Follow-up in clinic with Dr Villafana within 1-2 weeks; please call clinic nurse at      Consent (Lip)/Introductory Paragraph: The rationale for Mohs was explained to the patient and consent was obtained. The risks, benefits and alternatives to therapy were discussed in detail. Specifically, the risks of lip deformity, changes in the oral aperture, infection, scarring, bleeding, prolonged wound healing, incomplete removal, allergy to anesthesia, nerve injury and recurrence were addressed. Prior to the procedure, the treatment site was clearly identified and confirmed by the patient. All components of Universal Protocol/PAUSE Rule completed.

## 2020-10-15 NOTE — PROVIDER PROGRESS NOTES - EMERGENCY DEPT.
Encounter Date: 5/30/2019    ED Physician Progress Notes     ED Attending Sign-out Progress Note:  Patient signed out to me at shift change by Dr. Potts to f/u nephrology dispo, repeat VBG and overall disposition. It was his expectation patient would need admission.    Nephrology reported that they will arrange for emergent dialysis due to hypervolemia.  Repeat VBG with pH 7.41 and CO2 54 (previously 51). Given that the pH is normal, do not think adjustments are needed from the BiPAP at this time.     Discussed with internal medicine who admitted pt.    ED Clinical Impression:    ICD-10-CM ICD-9-CM   1. Acute and chronic respiratory failure with hypoxia J96.21 518.84     799.02   2. Shortness of breath R06.02 786.05   3. End stage renal failure on dialysis N18.6 585.6    Z99.2 V45.11   4. ESRD (end stage renal disease) N18.6 585.6   5. Acute on chronic combined systolic and diastolic congestive heart failure I50.43 428.43     428.0   6. Hypervolemia, unspecified hypervolemia type E87.70 276.69   7. Elevated troponin R74.8 790.6            ADDENDUM:  See significant event note for further details but patient became hypoglycemic resulting in AMS. Critical care time performed as below.    Attending Critical Care:   Critical Care Times:   ==============================================================  ·            Total Critical Care Time - exclusive of teaching and procedural time: 30 minutes.  ==============================================================  Critical care was necessary to treat or prevent imminent or life-threatening deterioration of the following conditions: hypoglycemia, hypoxic respiratory failure due to hypervolemia.   Critical care was time spent personally by me on the following activities: obtaining history from patient or relative, examination of patient, review of old charts, ordering lab, x-rays, and/or EKG, development of treatment plan with patient or relative, ordering and performing  treatments and interventions, evaluation of patient's response to treatment, discussion with consultants, interpretation of cardiac measurements, re-evaluation of patient's condition, ventilator management.   I performed ABG draw.  Critical Care Condition:  life-threatening        covid risk

## 2020-11-05 NOTE — PROGRESS NOTES
Patient called due to her lab not being processed. She cannot return today and will come on Friday for a finger stick. I advised to come sooner but she refused.   Number Of Stages: 1

## 2020-12-25 NOTE — ASSESSMENT & PLAN NOTE
- Continue Keppra 500 mg BID     Group Topic: [facility] KERMIT Education    Date: 12/25/2020  Start Time: 1000  End Time: 1050  Facilitators: XUAN Nuno; XUAN Talavera    Focus:  Self-Honesty - Education on what it means to be honest with yourself and discussion on the value of self-honesty. Handout: 25 Questions to Encourage Self-Honesty.  Number in attendance: 17        Method: Group    Attendance: Present    Mood/Affect: Appropriate    Behavior/Socialization: Appropriate to group and Provided feedback to peers    Participation: Active    Overall Patient Response to Group: Appropriate to topic    Individual Response to Group: Pt attended to the discussion respectfully and contributed when appropriate.      RADHA Nuno APSW  12/25/2020

## 2021-03-29 NOTE — PATIENT INSTRUCTIONS
STOP GABAPENTIN AND START LYRICA 1 TAB TWICE DAILY    CONSIDER SUPPLEMENTATION WITH ALPHA LIPOIC ACID 600-1200MG DAILY    YOU WILL BE CONTACTED BY [facility] ABOUT DIFFERENT COMPOUND CREAM   none

## 2021-08-04 NOTE — PROGRESS NOTES
Patient returned from dialysis via wheelchair per transport.  Patient's  Vital signs temp 97.9 109/66 pulse 55 resp 18 O 2 sat 93 % .  Pressure dressing right heel leaking blood when she stood up to transfer to bed.  Charge nurse at bedside .  Rhett NP came to assess wound to right heel.  New wound care orders noted.  Charge nurse applied dressing to right heel per wound care orders.  Call light in reach.     Home

## 2021-10-06 NOTE — PROGRESS NOTES
PHARMACY CONSULT NOTE: WARFARIN  Sisi Medel is a 58 y.o. female on warfarin therapy for Atrial Fibrillation. PharmD has been consulted for warfarin dosing.    Current order: 5mg daily  Home dose: 5mg daily  Coumadin clinic enrollment: Active  INR goal: 2-3    Lab Results   Component Value Date    INR 1.4 (H) 03/13/2019    INR 2.0 (H) 03/12/2019    INR 2.1 (H) 03/11/2019     Significant drug interactions: levofloxacin  Diet: Adult Diabetic without supplements     Recommendation(s):    INR 1.4 today, Would give another dose of 7.5mg since INR decreased to 1.4    INR daily (ordered).   Pharmacy will continue to follow and monitor warfarin    Thank you for the consult,  Ly Bright, PharmD, BCPS  [id number]    **Note: Consults are reviewed Monday-Friday 7:00am-3:30pm. THE ABOVE RECOMMENDATIONS ARE ONLY SUGGESTED.THE RECOMMENDATIONS SHOULD BE CONSIDERED IN CONJUNCTION WITH ALL PATIENT FACTORS. **     Propranolol Counseling:  I discussed with the patient the risks of propranolol including but not limited to low heart rate, low blood pressure, low blood sugar, restlessness and increased cold sensitivity. They should call the office if they experience any of these side effects.

## 2021-10-07 NOTE — PLAN OF CARE
Does NOT APPEAR VISUALLY SIGNIFICANT. Patient discharged to [facility] on 6/10/19.     06/11/19 1045   Final Note   Assessment Type Final Discharge Note   Anticipated Discharge Disposition SNF   What phone number can be called within the next 1-3 days to see how you are doing after discharge?   ([phone])   Hospital Follow Up  Appt(s) scheduled? Yes   Discharge plans and expectations educations in teach back method with documentation complete? Yes   Right Care Referral Info   Post Acute Recommendation SNF / Sub-Acute Rehab   Referral Type Skilled Nursing Facility   Facility Name [facility]

## 2022-03-22 NOTE — ASSESSMENT & PLAN NOTE
-Diagnosed 6 years ago per patient and she has required 5L home oxygen.  -Continue prednisone 10mg QD  -Fludrocortisone 0.1mg QD  -Duonebs q6h awake.     Pt aao x's 4. Resp. Even non labored. Skin warm and dry. Pt pleasant. Pt with mom. Pt has wound well approximated to right upper arm. Very minimal  Dried yellow secretion noted to wound. No bleeding. Slight redness noted at staple marks. No warmth at site. Pt c/o tender to touch.

## 2022-05-18 NOTE — PLAN OF CARE
[facility]    HISTORY AND PHYSICAL    Patient:  Franko Dominique Date:  5/17/2022   male, 62 year old  Admit Date:  5/17/2022   Attending:  Zackary Harrell MD      DATE OF SERVICE 5/17/2022    PRIMARY CARE PROVIDER  Peter Reis MD     ATTENDING PHYSICIAN    Zackary Harrell MD    CHIEF COMPLAINT    Chest pain    HPI    Franko Dominique is a 62 year old male medical history of extensive coronary artery disease status post multiple stents a, COPD not on home oxygen, essential hypertension, dyslipidemia, rheumatoid arthritis, who presented with sudden onset of retrosternal chest pain. Patient reported to me that he was sitting in a chair.  Patient stand to walk then he started to experience severe retrosternal chest pain up to 10/10, pressure-like, radiating to both shoulder and the back, and associated with shortness of breath. Patient of sublingual nitrate and atenolol which slightly improved the pain. He was seen by Dr. Palmer yesterday and he was started on oral prednisone which she took today. Symptoms associated with wheezing. Patient denies any headache, blurred vision, dysphagia, double vision, nausea, vomiting, hemoptysis, hematemesis, abdominal pain, diarrhea, dysuria, skin rash or focal body weakness.  Patient is former smoker denied alcohol use.  He is full code. He was exposed to sick grandkids who went back to school and all of them got sick. He was treated for H Pylori last month again.   -ED course:   Vital signs showed temperature 98.1°, pulse 185, respiratory 20, blood pressure 150/90 and he satting 96% on room air.    PAST MEDICAL & SURGICAL HISTORY    Past Medical History:   Diagnosis Date   • Anxiety    • CAD (coronary artery disease)    • COPD (chronic obstructive pulmonary disease) (CMS/[facility])    • Diverticulitis    • Essential (primary) hypertension    • Fibromyalgia    • High cholesterol    • MI (myocardial infarction) (CMS/[facility]) 2002   • Pneumonia    • Pneumonia due to  Problem: Occupational Therapy Goal  Goal: Occupational Therapy Goal  Goals to be met by: 06-24-19      Therapy to be on MWF and SUN (4x/week) addended 06-13-`19    Patient will increase functional independence with ADLs by performing:    UE Dressing with Set-up Assistance. GOAL MET 06/16/19  LE Dressing with Supervision. REVISED  LE dressing with Mod (A) seated from EOB.  Grooming while standing with Supervision. REVISED Grooming Mod (I) seated.  Toileting from bedside commode with Supervision for hygiene and clothing management. REVISED  Min (A) with toileting from BSC.  Bathing from  edge of bed with Supervision. REVISED Bathing from EOB with SBA  Excluding feet 2* to dressings on (B) feet.  Supine to sit with Modified Meagher. GOAL MET 06/16/19  Stand pivot transfers with Supervision.  REVISED   Sliding board transfers with Min (A)  Toilet transfer to bedside commode with Supervision.   REVISED Min (A) with toilet transfers.  Pt. To be I with HEP for BUE to improve level of endurance  Pt. To engage in dynamic standing activity x 10 minutes with S --  DISCONTINUED        Outcome: Ongoing (interventions implemented as appropriate)  .       [facility]      Post Procedures Instructions    *Resume Diet and Activity as tolerated. Rest for the remainder of the day. *You may fell worse before you feel better as the numbing medications wear off before the steroids take effect if used for your procedures. *Do not use affected extremity until numbness or loss of sensation has completely resolved without assistance. *DO NOT DRIVE, operate machinery/heavey equipment for 24 hours. *DO NOT DRINK ALCOHOL for 24 hours as it may interact with the sedation if you received it and also thins your blood and may cause you to bleed. *WAIT 24 hours before starting back ANY Blood thinning medications:   (Heparin, Coumadin, Warfarin, Lovenox, Plavix, Aggrenox)    *Resume Pre-Procedure Medications as prescribed except Blood Thinners unless directed by your Physician or Cardiologist.     *Avoid Hot tubs and Heating pad for 24 hours to prevent dissipation of medications, you may shower to remove bandages and remaining prep residue on the skin. * If you develop a Headache, drink plenty of fluids including beverages with caffeine (Coffee, Mt. Dew etc.) and rest.  If the headache persists longer than 24 hoursor intensifies - Please call [facility] ([facility]) [phone]      * If you are DIABETIC, check your blood sugar three times a day for the next three days, the steroids will increase your blood sugar. If your blood sugar is greater than 400 have someone drive you to the nearest [address]. * If you experience any of the following problems, call the [facility] [phone] between 8:00 am - 4:30pm or After Hours [phone].     Shortness of breath    Fever of 101 F or higher    Nausea / Vomiting (not normal to you)    Increasing stiffness in the neck    Weakness or numbness in the arms or legs that is not resolving    Prolonged and increasing pain > than 4 COVID-19 virus 10/10/2021   • Reactive airway disease    • Rheumatoid arthritis (CMS/HCC)      Past Surgical History:   Procedure Laterality Date   • Colectomy  2013    Sigmoid colon resection for diverticulitis   • Colonoscopy  12/10/2012    sigmoid diverticulosis   • Coronary angioplasty with stent placement      6 stents placed 2003, 2004, 2013   • Esophagogastroduodenoscopy  02/26/2020    Dr. De La Paz    • Rotator cuff repair Right 11/2020       HOME MEDS  Medications Prior to Admission   Medication Sig Dispense Refill   • predniSONE (DELTASONE) 50 MG tablet Take 1 tablet by mouth daily. 5 tablet 1   • amoxicillin-clavulanate (Augmentin) 875-125 MG per tablet Take 1 tablet by mouth every 12 hours. Take with food. 14 tablet 0   • omeprazole (PriLOSEC) 40 MG capsule Take 1 capsule by mouth daily. 30 capsule 0   • ipratropium (ATROVENT) 0.02 % nebulizer solution Take 2.5 mLs by nebulization 4 times daily. DX;  COPD J44.9 375 mL 3   • albuterol 108 (90 Base) MCG/ACT inhaler Inhale 2 puffs into the lungs every 4 hours as needed for Wheezing. 8.5 g 0   • fluticasone propionate (Flovent HFA) 110 MCG/ACT inhaler Inhale 1 puff into the lungs 2 times daily. 12 g 12   • polyethylene glycol (MIRALAX) powder Take 17 g by mouth daily. Indications: Constipation 850 g 5   • atorvastatin (LIPITOR) 80 MG tablet Take 80 mg by mouth daily. Indications: Disease of the Heart and Blood Vessels     • clopidogrel (PLAVIX) 75 MG tablet Take 75 mg by mouth daily.     • atenolol (TENORMIN) 50 MG tablet Take 25 mg by mouth 2 times daily.      • aspirin 81 MG tablet Take 81 mg by mouth daily.         CURRENT MEDICATIONS    Current Facility-Administered Medications   Medication Dose Route Frequency Provider Last Rate Last Admin   • dilTIAZem (CARDIZEM) 125 mg/125 mL in sodium chloride infusion Solution  0-20 mg/hr Intravenous Continuous Christian De Santiago MD 10 mL/hr at 05/17/22 2142 10 mg/hr at 05/17/22 2142   • enoxaparin (LOVENOX) injection 105  days    ANYTHING OUT of the ORDINARY TO YOU    If YOU are experiencing a severe reaction / complication that you have never had before post procedure, call 911 or go to the nearest emergency room! All patients must have a  for transportation [place] regardless if you do or do not receive sedation. DISCHARGE SUMMARY from Nurse      PATIENT INSTRUCTIONS:    After Oral  or intravenous sedation, for 24 hours or while taking prescription Narcotics:  · Limit your activities  · Do not drive and operate hazardous machinery  · Do not make important personal or business decisions  · Do  not drink alcoholic beverages  · If you have not urinated within 8 hours after discharge, please contact your surgeon on call. Report the following to your surgeon:  · Excessive pain, swelling, redness or odor of or around the surgical area  · Temperature over 101  · Nausea and vomiting lasting longer than 4 hours or if unable to take medications  · Any signs of decreased circulation or nerve impairment to extremity: change in color, persistent  numbness, tingling, coldness or increase pain  · Any questions        What to do at Home:  Recommended activity: Activity as tolerated, NO DRIVING FOR 24 Hours post injection          *  Please give a list of your current medications to your Primary Care Provider. *  Please update this list whenever your medications are discontinued, doses are      changed, or new medications (including over-the-counter products) are added. *  Please carry medication information at all times in case of emergency situations. These are general instructions for a healthy lifestyle:    No smoking/ No tobacco products/ Avoid exposure to second hand smoke    Surgeon General's Warning:  Quitting smoking now greatly reduces serious risk to your health.     Obesity, smoking, and sedentary lifestyle greatly increases your risk for illness    A healthy diet, regular physical exercise & mg  1 mg/kg Subcutaneous Once Clint Michael DO       • ipratropium-albuterol (DUONEB) 0.5-2.5 (3) MG/3ML nebulizer solution 3 mL  3 mL Nebulization Once Clint Michael DO       • LORazepam (ATIVAN) injection 0.5 mg  0.5 mg Intravenous Once Clint Michael DO       • [START ON 2022] aspirin chewable 81 mg  81 mg Oral Daily Zackary Harrell MD       • atorvastatin (LIPITOR) tablet 80 mg  80 mg Oral Daily Zackary Harrell MD       • [START ON 2022] clopidogrel (PLAVIX) tablet 75 mg  75 mg Oral Daily Zackary Harrell MD       • [START ON 2022] predniSONE (DELTASONE) tablet 50 mg  50 mg Oral Daily Zackary Harrell MD       • sodium chloride 0.9 % flush bag 25 mL  25 mL Intravenous PRN Zackary Harrell MD       • [START ON 2022] sodium chloride (PF) 0.9 % injection 2 mL  2 mL Intracatheter 2 times per day Zackary Harrell MD           ALLERGIES    ALLERGIES:   Allergen Reactions   • Ciprofloxacin    • Ceftin Other (See Comments)     Felt his face was warm and stopped it   • Levaquin Other (See Comments)     Tendonitis. Patient does not believe he had significant problem with Levaquin, is willing to try again       SOCIAL HISTORY    Social History     Tobacco Use   • Smoking status: Former Smoker     Packs/day: 1.00     Years: 30.00     Pack years: 30.00     Types: Cigarettes     Start date:      Quit date: 2009     Years since quittin.2   • Smokeless tobacco: Never Used   • Tobacco comment: quit date 2 months ago   Substance Use Topics   • Alcohol use: No     Alcohol/week: 0.0 standard drinks   • Drug use: No       FAMILY HISTORY    Family History   Problem Relation Age of Onset   • Heart disease Mother    • Hypertension Mother    • High cholesterol Mother    • COPD Mother    • Arthritis Mother    • Stroke Father    • Diabetes Father    • COPD Father    • Arthritis Father    • Coronary Artery Disease Brother      REVIEW OF SYSTEMS:  All 11 Systems were reviewed and found to be negative  weight monitoring are important for maintaining a healthy lifestyle    You may be retaining fluid if you have a history of heart failure or if you experience any of the following symptoms:  Weight gain of 3 pounds or more overnight or 5 pounds in a week, increased swelling in our hands or feet or shortness of breath while lying flat in bed. Please call your doctor as soon as you notice any of these symptoms; do not wait until your next office visit. Recognize signs and symptoms of STROKE:    F-face looks uneven    A-arms unable to move or move unevenly    S-speech slurred or non-existent    T-time-call 911 as soon as signs and symptoms begin-DO NOT go       Back to bed or wait to see if you get better-TIME IS BRAIN. except what's mentioned in HPI and all other systems were reviewed and there are no further pertinent positives or pertinent negatives.    PHYSICAL EXAMINATION    Vital 24 Hour Range Most Recent Value   Temperature Temp  Min: 98.1 °F (36.7 °C)  Max: 98.1 °F (36.7 °C) 98.1 °F (36.7 °C)   Pulse Pulse  Min: 102  Max: 146 (!) 102   Respiratory Resp  Min: 15  Max: 30 19   Blood Pressure BP  Min: 112/62  Max: 158/89 128/64   Pulse Oximetry SpO2  Min: 94 %  Max: 96 % 95 %   Current O2   O2 Device: None/Room air (05/17/22 4655)   Body mass index is 33.5 kg/m².    GENERAL: No acute distress, well nourished, and answers questions appropriately.  Looks stable  NEURO: Alert, oriented to time, place and person. Following commands, moving all 4 (RUE, LUE, RLE, LLE) extremities, normal tone, sensations intact to touch. Face symmetric. Speech normal. Cranial nerves 2-12 exam within normal  Head: Normocephalic and atraumatic.   Right and Left Ears: External ear normal.   Nose: Nose normal.  Mouth/Throat: mouth mucosa is clear and moist. No oropharyngeal exudate.   Eyes: Conjunctivae normal. No scleral icterus. Pupils are equal, round, and reactive to light. Right eye exhibits no discharge. Left eye exhibits no discharge  Neck: Normal range of motion. Neck supple. No JVD present.   CVS: Normal rate, irregular rhythm, normal heart sounds and intact distal pulses. Exam reveals no gallop and no friction rub. No murmur heard.  PUL: Exhibits no tenderness. Does not use accessory muscles of respiration. Lungs clear to auscultation bilaterally, no rales, +++ wheezes.  GI:  Normal bowel sounds, nontender, nondistended, no hepatosplenomegaly.  MSK: Normal range of motion. No joints tenderness or deformity. No Peripheral edema.  SKIN:  No decreased skin turgor.  PSYCH:Cooperative. Mood normal. No agitation or psychosis.    LABS    Recent Labs   Lab 05/17/22 2000   WBC 13.3*   RBC 5.21   HGB 15.2   HCT 45.4      SEG 85       Recent Labs    Lab 05/17/22 2000   SODIUM 140   POTASSIUM 4.3   CHLORIDE 106   CO2 22   BUN 20   CREATININE 0.74   GLUCOSE 182*   CALCIUM 9.3       Recent Labs   Lab 05/17/22 2000   AST 39*   GPT 35   BILIRUBIN 0.2   ALBUMIN 3.5*   ALKPT 101       No results found    Troponin I:  No results found    Blood Culture  No results found for this or any previous visit.    Urine Culture  No results found for this or any previous visit.    IMAGING:   XR Chest AP or PA    Result Date: 5/17/2022  INTERPRETATION LOCATION: [facility] EXAM: XR CHEST AP OR PA  at 05/17/2022 20:11 COMPARISON: 1/24/2022 HISTORY:62 years Male chest pain. FINDINGS: Lungs are hypoinflated with mild elevation of left hemidiaphragm..  Heart size and vascularity normal.  No pneumothorax or effusions.     IMPRESSION: 1.  Negative chest. Signed by: Callum Abdullahi      ECG  AFib with RVR, pending final cardiology interpretation    ASSESSMENT & PLAN  Franko Dominique is a 62 year old male medical history of extensive coronary artery disease status post multiple stents, COPD not on home oxygen, essential hypertension, dyslipidemia, rheumatoid arthritis, who presented with sudden onset of retrosternal chest pain.    New onset Atrial fibrillation with RVR:  On presentation, patient found to have AFib with RVR with abnormal marked ST abnormality most likely related to rapid heart rate.  Given 20 mg of IV Cardizem and then drip was started.  Heart rate slowed down and symptoms improved. I had a long discussion with the patient regarding the pathophysiology of atrial fibrillation, the management options available, including rate and rhythm control strategies. Mr. Dominique and his wife were counseled on the risks and benefits of starting anticoagulation for CVA prophylaxis. This discussion included risk of bleeding, especially major bleeding, which includes events that require hospitalization, transfusion, surgery, or involves particularly sensitive anatomic locations like  but not limited to gastrointestinal bleeding, Retroperitoneal or intracranial hemorrhage. Discussed need for INR monitoring with coumadin and that this would need follow-up in Coumadin Clinic, in order to ensure both a therapeutic level of anticoagulation and that INR is not sub or supratherapeutic. Risks alternatives, and benefits of Direct oral anticoagulants (DOACs) including, Pradaxa/Xarelto/Eliquis were discussed in detail, including, but not limited to: bleeding, complications associated with bleeding, cost of anticoagulation therapy, convenience of anticoagulation therapy, and antidote availability (Note: Idarucizumab inhibits dabigatran binding to thrombin and Andexanet,(a modified, inactive, recombinant factor X binds factor Xa inhibitors and competitively inhibits their binding to X). It reverses rivaroxaban, apixaban, and edoxaban action. Questions invited and answered. Patient verbalized understanding. Mr. Dominique expressed understanding and all questions were answered. Mr. Dominique has chosen to proceed with anticoagulation with Lovenox.  -Will admit to ICU with tele monitoring  -Check CBC, Troponin, and CMP.  -2-D echo.  -Check BMP and Mg levels daily.  -Replete electrolytes to maintain K > 4, Mg > 2.  -Start Lovenox subcut, 1mg/kg, BID  -Continue with Cardizem drip  -Consult Cardiology.    Mild COPD exacerbation:  Patient was evaluated I had pulmonary office yesterday and was started on oral prednisone and Augmentin.  At this time, he continued to have a wheezing and he requested bronchodilators.  Given presentation with AFib with RVR, we tried to use Xopenex instead of DuoNeb to minimize risk of tachycardia.   -Continue prednisone 50 mg p.o. daily.  -Continue home dose of Augmentin.    -Continue Xopenex 4 times a day.    Coronary artery disease:  Known case of coronary disease status post multiple stents more than 6.  He presented this time with chest pain, which most likely precipitated bed AFib with  RVR.  On admission, stress EKG showed mild ST abnormalities which improve after control heart rate.  Patient has repeated high sensitive troponin twice came back negative.  He reported to me that after he took his dose of atenolol, sublingual nitrate his symptoms improved.  ED physician discussed the case with on-call Cardiology Dr. Pereyra.  Recommendation to continue heart rate control, for anticoagulation and he will reassess the patient morning.   -Follow-up troponin every 6 hour.    -Continue risk factor modification including antiplatelets, and high-dose statin.    -Continue to resume doses of Lovenox as above.      Essential hypertension:  -Resume home dose of atenolol.    For the other chronic medical problems which are stable at this time, patient will be continued on current management except as otherwise highlighted above and further management will be dependent on the hospital course.    Diet:  Cardiac    DVT Prophylaxis: Lovenox    Disposition: MICU    CODE STATUS:  Full Resuscitation    Critical care was time spent personally by me on the following activities:  Development of treatment plan with patient or surrogate, discussions with consultants, evaluation of patient's response to treatment, examination of patient, obtaining history from patient or surrogate, ordering treatments and interventions, ordering and review of radiographic studies, ordering and review of laboratory studies, pulse oximetry, re-evaluation of patient's condition and review of old charts    Goals of care were discussed with the patient and family which included but not limited to anticipated treatment course during the current hospitalization, recovery from current event, discharge planning and transitions of care responsibilities and expected outcomes. Code Status was addressed on admission as well.  Discussions with consultants, evaluation of patient's response to treatment, examination of patient, obtaining history from patient  or surrogate, ordering and performing treatments and interventions, ordering and review of laboratory studies, ordering and review of radiographic studies, re-evaluation of patient's condition, and review of old charts.    Case personally discussed with:  ED physician    Is the patient expected to require a two midnight stay in the hospital? No. I certify that I expect inpatient services for greater than two midnights are medically necessary for this patient.    The plan was discussed with the patient and the wife.      Zackary Harrell MD.  Hospitalist  Pager:[phone].  Department of Internal Medicine  [facility]    5/17/2022  11:37 PM

## 2022-08-05 NOTE — TELEPHONE ENCOUNTER
----- Message from Romana Schaffer sent at 11/15/2017  9:27 AM CST -----  Contact: [facility] [phone]  ..Refill request.  predniSONE (DELTASONE) 10 MG tablet     ..  [facility]   [phone] (phone)   [phone] (fax)   Itraconazole Counseling:  I discussed with the patient the risks of itraconazole including but not limited to liver damage, nausea/vomiting, neuropathy, and severe allergy.  The patient understands that this medication is best absorbed when taken with acidic beverages such as non-diet cola or ginger ale.  The patient understands that monitoring is required including baseline LFTs and repeat LFTs at intervals.  The patient understands that they are to contact us or the primary physician if concerning signs are noted. Azithromycin Counseling:  I discussed with the patient the risks of azithromycin including but not limited to GI upset, allergic reaction, drug rash, diarrhea, and yeast infections. Ivermectin Pregnancy And Lactation Text: This medication is Pregnancy Category C and it isn't known if it is safe during pregnancy. It is also excreted in breast milk. Infliximab Pregnancy And Lactation Text: This medication is Pregnancy Category B and is considered safe during pregnancy. It is unknown if this medication is excreted in breast milk. Xolair Counseling:  Patient informed of potential adverse effects including but not limited to fever, muscle aches, rash and allergic reactions.  The patient verbalized understanding of the proper use and possible adverse effects of Xolair.  All of the patient's questions and concerns were addressed. Bexarotene Counseling:  I discussed with the patient the risks of bexarotene including but not limited to hair loss, dry lips/skin/eyes, liver abnormalities, hyperlipidemia, pancreatitis, depression/suicidal ideation, photosensitivity, drug rash/allergic reactions, hypothyroidism, anemia, leukopenia, infection, cataracts, and teratogenicity.  Patient understands that they will need regular blood tests to check lipid profile, liver function tests, white blood cell count, thyroid function tests and pregnancy test if applicable. Hydroxychloroquine Pregnancy And Lactation Text: This medication has been shown to cause fetal harm but it isn't assigned a Pregnancy Risk Category. There are small amounts excreted in breast milk. Xolair Pregnancy And Lactation Text: This medication is Pregnancy Category B and is considered safe during pregnancy. This medication is excreted in breast milk. Nsaids Counseling: NSAID Counseling: I discussed with the patient that NSAIDs should be taken with food. Prolonged use of NSAIDs can result in the development of stomach ulcers.  Patient advised to stop taking NSAIDs if abdominal pain occurs.  The patient verbalized understanding of the proper use and possible adverse effects of NSAIDs.  All of the patient's questions and concerns were addressed. Valtrex Pregnancy And Lactation Text: this medication is Pregnancy Category B and is considered safe during pregnancy. This medication is not directly found in breast milk but it's metabolite acyclovir is present. Eucrisa Pregnancy And Lactation Text: This medication has not been assigned a Pregnancy Risk Category but animal studies failed to show danger with the topical medication. It is unknown if the medication is excreted in breast milk. Quinolones Pregnancy And Lactation Text: This medication is Pregnancy Category C and it isn't know if it is safe during pregnancy. It is also excreted in breast milk. Rituxan Counseling:  I discussed with the patient the risks of Rituxan infusions. Side effects can include infusion reactions, severe drug rashes including mucocutaneous reactions, reactivation of latent hepatitis and other infections and rarely progressive multifocal leukoencephalopathy.  All of the patient's questions and concerns were addressed. Topical Clindamycin Pregnancy And Lactation Text: This medication is Pregnancy Category B and is considered safe during pregnancy. It is unknown if it is excreted in breast milk. Bexarotene Pregnancy And Lactation Text: This medication is Pregnancy Category X and should not be given to women who are pregnant or may become pregnant. This medication should not be used if you are breast feeding. Nsaids Pregnancy And Lactation Text: These medications are considered safe up to 30 weeks gestation. It is excreted in breast milk. Topical Sulfur Applications Counseling: Topical Sulfur Counseling: Patient counseled that this medication may cause skin irritation or allergic reactions.  In the event of skin irritation, the patient was advised to reduce the amount of the drug applied or use it less frequently.   The patient verbalized understanding of the proper use and possible adverse effects of topical sulfur application.  All of the patient's questions and concerns were addressed. Rifampin Counseling: I discussed with the patient the risks of rifampin including but not limited to liver damage, kidney damage, red-orange body fluids, nausea/vomiting and severe allergy. Azithromycin Pregnancy And Lactation Text: This medication is considered safe during pregnancy and is also secreted in breast milk. Hydroquinone Counseling:  Patient advised that medication may result in skin irritation, lightening (hypopigmentation), dryness, and burning.  In the event of skin irritation, the patient was advised to reduce the amount of the drug applied or use it less frequently.  Rarely, spots that are treated with hydroquinone can become darker (pseudoochronosis).  Should this occur, patient instructed to stop medication and call the office. The patient verbalized understanding of the proper use and possible adverse effects of hydroquinone.  All of the patient's questions and concerns were addressed. Bactrim Counseling:  I discussed with the patient the risks of sulfa antibiotics including but not limited to GI upset, allergic reaction, drug rash, diarrhea, dizziness, photosensitivity, and yeast infections.  Rarely, more serious reactions can occur including but not limited to aplastic anemia, agranulocytosis, methemoglobinemia, blood dyscrasias, liver or kidney failure, lung infiltrates or desquamative/blistering drug rashes. Ketoconazole Counseling:   Patient counseled regarding improving absorption with orange juice.  Adverse effects include but are not limited to breast enlargement, headache, diarrhea, nausea, upset stomach, liver function test abnormalities, taste disturbance, and stomach pain.  There is a rare possibility of liver failure that can occur when taking ketoconazole. The patient understands that monitoring of LFTs may be required, especially at baseline. The patient verbalized understanding of the proper use and possible adverse effects of ketoconazole.  All of the patient's questions and concerns were addressed. Isotretinoin Counseling: Patient should get monthly blood tests, not donate blood, not drive at night if vision affected, not share medication, and not undergo elective surgery for 6 months after tx completed. Side effects reviewed, pt to contact office should one occur. Use Enhanced Medication Counseling?: No Odomzo Counseling- I discussed with the patient the risks of Odomzo including but not limited to nausea, vomiting, diarrhea, constipation, weight loss, changes in the sense of taste, decreased appetite, muscle spasms, and hair loss.  The patient verbalized understanding of the proper use and possible adverse effects of Odomzo.  All of the patient's questions and concerns were addressed. Topical Sulfur Applications Pregnancy And Lactation Text: This medication is Pregnancy Category C and has an unknown safety profile during pregnancy. It is unknown if this topical medication is excreted in breast milk. Rituxan Pregnancy And Lactation Text: This medication is Pregnancy Category C and it isn't know if it is safe during pregnancy. It is unknown if this medication is excreted in breast milk but similar antibodies are known to be excreted. Azathioprine Counseling:  I discussed with the patient the risks of azathioprine including but not limited to myelosuppression, immunosuppression, hepatotoxicity, lymphoma, and infections.  The patient understands that monitoring is required including baseline LFTs, Creatinine, possible TPMP genotyping and weekly CBCs for the first month and then every 2 weeks thereafter.  The patient verbalized understanding of the proper use and possible adverse effects of azathioprine.  All of the patient's questions and concerns were addressed. Azathioprine Pregnancy And Lactation Text: This medication is Pregnancy Category D and isn't considered safe during pregnancy. It is unknown if this medication is excreted in breast milk. Ketoconazole Pregnancy And Lactation Text: This medication is Pregnancy Category C and it isn't know if it is safe during pregnancy. It is also excreted in breast milk and breast feeding isn't recommended. Siliq Counseling:  I discussed with the patient the risks of Siliq including but not limited to new or worsening depression, suicidal thoughts and behavior, immunosuppression, malignancy, posterior leukoencephalopathy syndrome, and serious infections.  The patient understands that monitoring is required including a PPD at baseline and must alert us or the primary physician if symptoms of infection or other concerning signs are noted. There is also a special program designed to monitor depression which is required with Siliq. Bactrim Pregnancy And Lactation Text: This medication is Pregnancy Category D and is known to cause fetal risk.  It is also excreted in breast milk. Isotretinoin Pregnancy And Lactation Text: This medication is Pregnancy Category X and is considered extremely dangerous during pregnancy. It is unknown if it is excreted in breast milk. High Dose Vitamin A Counseling: Side effects reviewed, pt to contact office should one occur. Odomzo Pregnancy And Lactation Text: This medication is Pregnancy Category X and is absolutely contraindicated during pregnancy. It is unknown if it is excreted in breast milk. Zyclara Counseling:  I discussed with the patient the risks of imiquimod including but not limited to erythema, scaling, itching, weeping, crusting, and pain.  Patient understands that the inflammatory response to imiquimod is variable from person to person and was educated regarded proper titration schedule.  If flu-like symptoms develop, patient knows to discontinue the medication and contact us. Imiquimod Counseling:  I discussed with the patient the risks of imiquimod including but not limited to erythema, scaling, itching, weeping, crusting, and pain.  Patient understands that the inflammatory response to imiquimod is variable from person to person and was educated regarded proper titration schedule.  If flu-like symptoms develop, patient knows to discontinue the medication and contact us. Imiquimod Pregnancy And Lactation Text: This medication is Pregnancy Category C. It is unknown if this medication is excreted in breast milk. Cephalexin Counseling: I counseled the patient regarding use of cephalexin as an antibiotic for prophylactic and/or therapeutic purposes. Cephalexin (commonly prescribed under brand name Keflex) is a cephalosporin antibiotic which is active against numerous classes of bacteria, including most skin bacteria. Side effects may include nausea, diarrhea, gastrointestinal upset, rash, hives, yeast infections, and in rare cases, hepatitis, kidney disease, seizures, fever, confusion, neurologic symptoms, and others. Patients with severe allergies to penicillin medications are cautioned that there is about a 10% incidence of cross-reactivity with cephalosporins. When possible, patients with penicillin allergies should use alternatives to cephalosporins for antibiotic therapy. Siliq Pregnancy And Lactation Text: The risk during pregnancy and breastfeeding is uncertain with this medication. Terbinafine Counseling: Patient counseling regarding adverse effects of terbinafine including but not limited to headache, diarrhea, rash, upset stomach, liver function test abnormalities, itching, taste/smell disturbance, nausea, abdominal pain, and flatulence.  There is a rare possibility of liver failure that can occur when taking terbinafine.  The patient understands that a baseline LFT and kidney function test may be required. The patient verbalized understanding of the proper use and possible adverse effects of terbinafine.  All of the patient's questions and concerns were addressed. Cellcept Counseling:  I discussed with the patient the risks of mycophenolate mofetil including but not limited to infection/immunosuppression, GI upset, hypokalemia, hypercholesterolemia, bone marrow suppression, lymphoproliferative disorders, malignancy, GI ulceration/bleed/perforation, colitis, interstitial lung disease, kidney failure, progressive multifocal leukoencephalopathy, and birth defects.  The patient understands that monitoring is required including a baseline creatinine and regular CBC testing. In addition, patient must alert us immediately if symptoms of infection or other concerning signs are noted. High Dose Vitamin A Pregnancy And Lactation Text: High dose vitamin A therapy is contraindicated during pregnancy and breast feeding. Cimzia Counseling:  I discussed with the patient the risks of Cimzia including but not limited to immunosuppression, allergic reactions and infections.  The patient understands that monitoring is required including a PPD at baseline and must alert us or the primary physician if symptoms of infection or other concerning signs are noted. Otezla Counseling: The side effects of Otezla were discussed with the patient, including but not limited to worsening or new depression, weight loss, diarrhea, nausea, upper respiratory tract infection, and headache. Patient instructed to call the office should any adverse effect occur.  The patient verbalized understanding of the proper use and possible adverse effects of Otezla.  All the patient's questions and concerns were addressed. Terbinafine Pregnancy And Lactation Text: This medication is Pregnancy Category B and is considered safe during pregnancy. It is also excreted in breast milk and breast feeding isn't recommended. Otezla Pregnancy And Lactation Text: This medication is Pregnancy Category C and it isn't known if it is safe during pregnancy. It is unknown if it is excreted in breast milk. Minoxidil Counseling: Minoxidil is a topical medication which can increase blood flow where it is applied. It is uncertain how this medication increases hair growth. Side effects are uncommon and include stinging and allergic reactions. Detail Level: Generalized Simponi Counseling:  I discussed with the patient the risks of golimumab including but not limited to myelosuppression, immunosuppression, autoimmune hepatitis, demyelinating diseases, lymphoma, and serious infections.  The patient understands that monitoring is required including a PPD at baseline and must alert us or the primary physician if symptoms of infection or other concerning signs are noted. Cephalexin Pregnancy And Lactation Text: This medication is Pregnancy Category B and considered safe during pregnancy.  It is also excreted in breast milk but can be used safely for shorter doses. Cimzia Pregnancy And Lactation Text: This medication crosses the placenta but can be considered safe in certain situations. Cimzia may be excreted in breast milk. Oxybutynin Counseling:  I discussed with the patient the risks of oxybutynin including but not limited to skin rash, drowsiness, dry mouth, difficulty urinating, and blurred vision. Cyclophosphamide Counseling:  I discussed with the patient the risks of cyclophosphamide including but not limited to hair loss, hormonal abnormalities, decreased fertility, abdominal pain, diarrhea, nausea and vomiting, bone marrow suppression and infection. The patient understands that monitoring is required while taking this medication. Clindamycin Counseling: I counseled the patient regarding use of clindamycin as an antibiotic for prophylactic and/or therapeutic purposes. Clindamycin is active against numerous classes of bacteria, including skin bacteria. Side effects may include nausea, diarrhea, gastrointestinal upset, rash, hives, yeast infections, and in rare cases, colitis. Cimetidine Counseling:  I discussed with the patient the risks of Cimetidine including but not limited to gynecomastia, headache, diarrhea, nausea, drowsiness, arrhythmias, pancreatitis, skin rashes, psychosis, bone marrow suppression and kidney toxicity. Benzoyl Peroxide Counseling: Patient counseled that medicine may cause skin irritation and bleach clothing.  In the event of skin irritation, the patient was advised to reduce the amount of the drug applied or use it less frequently.   The patient verbalized understanding of the proper use and possible adverse effects of benzoyl peroxide.  All of the patient's questions and concerns were addressed. Picato Counseling:  I discussed with the patient the risks of Picato including but not limited to erythema, scaling, itching, weeping, crusting, and pain. Rifampin Pregnancy And Lactation Text: This medication is Pregnancy Category C and it isn't know if it is safe during pregnancy. It is also excreted in breast milk and should not be used if you are breast feeding. Clindamycin Pregnancy And Lactation Text: This medication can be used in pregnancy if certain situations. Clindamycin is also present in breast milk. Arava Counseling:  Patient counseled regarding adverse effects of Arava including but not limited to nausea, vomiting, abnormalities in liver function tests. Patients may develop mouth sores, rash, diarrhea, and abnormalities in blood counts. The patient understands that monitoring is required including LFTs and blood counts.  There is a rare possibility of scarring of the liver and lung problems that can occur when taking methotrexate. Persistent nausea, loss of appetite, pale stools, dark urine, cough, and shortness of breath should be reported immediately. Patient advised to discontinue Arava treatment and consult with a physician prior to attempting conception. The patient will have to undergo a treatment to eliminate Arava from the body prior to conception. Cosentyx Counseling:  I discussed with the patient the risks of Cosentyx including but not limited to worsening of Crohn's disease, immunosuppression, allergic reactions and infections.  The patient understands that monitoring is required including a PPD at baseline and must alert us or the primary physician if symptoms of infection or other concerning signs are noted. Cyclophosphamide Pregnancy And Lactation Text: This medication is Pregnancy Category D and it isn't considered safe during pregnancy. This medication is excreted in breast milk. Skyrizi Counseling: I discussed with the patient the risks of risankizumab-rzaa including but not limited to immunosuppression, and serious infections.  The patient understands that monitoring is required including a PPD at baseline and must alert us or the primary physician if symptoms of infection or other concerning signs are noted. Cyclosporine Counseling:  I discussed with the patient the risks of cyclosporine including but not limited to hypertension, gingival hyperplasia,myelosuppression, immunosuppression, liver damage, kidney damage, neurotoxicity, lymphoma, and serious infections. The patient understands that monitoring is required including baseline blood pressure, CBC, CMP, lipid panel and uric acid, and then 1-2 times monthly CMP and blood pressure. Dapsone Counseling: I discussed with the patient the risks of dapsone including but not limited to hemolytic anemia, agranulocytosis, rashes, methemoglobinemia, kidney failure, peripheral neuropathy, headaches, GI upset, and liver toxicity.  Patients who start dapsone require monitoring including baseline LFTs and weekly CBCs for the first month, then every month thereafter.  The patient verbalized understanding of the proper use and possible adverse effects of dapsone.  All of the patient's questions and concerns were addressed. Sarecycline Counseling: Patient advised regarding possible photosensitivity and discoloration of the teeth, skin, lips, tongue and gums.  Patient instructed to avoid sunlight, if possible.  When exposed to sunlight, patients should wear protective clothing, sunglasses, and sunscreen.  The patient was instructed to call the office immediately if the following severe adverse effects occur:  hearing changes, easy bruising/bleeding, severe headache, or vision changes.  The patient verbalized understanding of the proper use and possible adverse effects of sarecycline.  All of the patient's questions and concerns were addressed. Benzoyl Peroxide Pregnancy And Lactation Text: This medication is Pregnancy Category C. It is unknown if benzoyl peroxide is excreted in breast milk. Carac Counseling:  I discussed with the patient the risks of Carac including but not limited to erythema, scaling, itching, weeping, crusting, and pain. Birth Control Pills Counseling: Birth Control Pill Counseling: I discussed with the patient the potential side effects of OCPs including but not limited to increased risk of stroke, heart attack, thrombophlebitis, deep venous thrombosis, hepatic adenomas, breast changes, GI upset, headaches, and depression.  The patient verbalized understanding of the proper use and possible adverse effects of OCPs. All of the patient's questions and concerns were addressed. Dupixent Counseling: I discussed with the patient the risks of dupilumab including but not limited to eye infection and irritation, cold sores, injection site reactions, worsening of asthma, allergic reactions and increased risk of parasitic infection.  Live vaccines should be avoided while taking dupilumab. Dupilumab will also interact with certain medications such as warfarin and cyclosporine. The patient understands that monitoring is required and they must alert us or the primary physician if symptoms of infection or other concerning signs are noted. Doxycycline Counseling:  Patient counseled regarding possible photosensitivity and increased risk for sunburn.  Patient instructed to avoid sunlight, if possible.  When exposed to sunlight, patients should wear protective clothing, sunglasses, and sunscreen.  The patient was instructed to call the office immediately if the following severe adverse effects occur:  hearing changes, easy bruising/bleeding, severe headache, or vision changes.  The patient verbalized understanding of the proper use and possible adverse effects of doxycycline.  All of the patient's questions and concerns were addressed. Clofazimine Counseling:  I discussed with the patient the risks of clofazimine including but not limited to skin and eye pigmentation, liver damage, nausea/vomiting, gastrointestinal bleeding and allergy. Protopic Counseling: Patient may experience a mild burning sensation during topical application. Protopic is not approved in children less than 2 years of age. There have been case reports of hematologic and skin malignancies in patients using topical calcineurin inhibitors although causality is questionable. Sarecycline Pregnancy And Lactation Text: This medication is Pregnancy Category D and not consider safe during pregnancy. It is also excreted in breast milk. Doxycycline Pregnancy And Lactation Text: This medication is Pregnancy Category D and not consider safe during pregnancy. It is also excreted in breast milk but is considered safe for shorter treatment courses. Stelara Counseling:  I discussed with the patient the risks of ustekinumab including but not limited to immunosuppression, malignancy, posterior leukoencephalopathy syndrome, and serious infections.  The patient understands that monitoring is required including a PPD at baseline and must alert us or the primary physician if symptoms of infection or other concerning signs are noted. Dapsone Pregnancy And Lactation Text: This medication is Pregnancy Category C and is not considered safe during pregnancy or breast feeding. Clofazimine Pregnancy And Lactation Text: This medication is Pregnancy Category C and isn't considered safe during pregnancy. It is excreted in breast milk. Dupixent Pregnancy And Lactation Text: This medication likely crosses the placenta but the risk for the fetus is uncertain. This medication is excreted in breast milk. Carac Pregnancy And Lactation Text: This medication is Pregnancy Category X and contraindicated in pregnancy and in women who may become pregnant. It is unknown if this medication is excreted in breast milk. Cyclosporine Pregnancy And Lactation Text: This medication is Pregnancy Category C and it isn't know if it is safe during pregnancy. This medication is excreted in breast milk. Doxepin Counseling:  Patient advised that the medication is sedating and not to drive a car after taking this medication. Patient informed of potential adverse effects including but not limited to dry mouth, urinary retention, and blurry vision.  The patient verbalized understanding of the proper use and possible adverse effects of doxepin.  All of the patient's questions and concerns were addressed. Birth Control Pills Pregnancy And Lactation Text: This medication should be avoided if pregnant and for the first 30 days post-partum. Tetracycline Counseling: Patient counseled regarding possible photosensitivity and increased risk for sunburn.  Patient instructed to avoid sunlight, if possible.  When exposed to sunlight, patients should wear protective clothing, sunglasses, and sunscreen.  The patient was instructed to call the office immediately if the following severe adverse effects occur:  hearing changes, easy bruising/bleeding, severe headache, or vision changes.  The patient verbalized understanding of the proper use and possible adverse effects of tetracycline.  All of the patient's questions and concerns were addressed. Patient understands to avoid pregnancy while on therapy due to potential birth defects. Doxepin Pregnancy And Lactation Text: This medication is Pregnancy Category C and it isn't known if it is safe during pregnancy. It is also excreted in breast milk and breast feeding isn't recommended. Spironolactone Counseling: Patient advised regarding risks of diarrhea, abdominal pain, hyperkalemia, birth defects (for female patients), liver toxicity and renal toxicity. The patient may need blood work to monitor liver and kidney function and potassium levels while on therapy. The patient verbalized understanding of the proper use and possible adverse effects of spironolactone.  All of the patient's questions and concerns were addressed. Erivedge Counseling- I discussed with the patient the risks of Erivedge including but not limited to nausea, vomiting, diarrhea, constipation, weight loss, changes in the sense of taste, decreased appetite, muscle spasms, and hair loss.  The patient verbalized understanding of the proper use and possible adverse effects of Erivedge.  All of the patient's questions and concerns were addressed. Protopic Pregnancy And Lactation Text: This medication is Pregnancy Category C. It is unknown if this medication is excreted in breast milk when applied topically. Taltz Counseling: I discussed with the patient the risks of ixekizumab including but not limited to immunosuppression, serious infections, worsening of inflammatory bowel disease and drug reactions.  The patient understands that monitoring is required including a PPD at baseline and must alert us or the primary physician if symptoms of infection or other concerning signs are noted. 5-Fu Counseling: 5-Fluorouracil Counseling:  I discussed with the patient the risks of 5-fluorouracil including but not limited to erythema, scaling, itching, weeping, crusting, and pain. Enbrel Counseling:  I discussed with the patient the risks of etanercept including but not limited to myelosuppression, immunosuppression, autoimmune hepatitis, demyelinating diseases, lymphoma, and infections.  The patient understands that monitoring is required including a PPD at baseline and must alert us or the primary physician if symptoms of infection or other concerning signs are noted. Colchicine Counseling:  Patient counseled regarding adverse effects including but not limited to stomach upset (nausea, vomiting, stomach pain, or diarrhea).  Patient instructed to limit alcohol consumption while taking this medication.  Colchicine may reduce blood counts especially with prolonged use.  The patient understands that monitoring of kidney function and blood counts may be required, especially at baseline. The patient verbalized understanding of the proper use and possible adverse effects of colchicine.  All of the patient's questions and concerns were addressed. Methotrexate Counseling:  Patient counseled regarding adverse effects of methotrexate including but not limited to nausea, vomiting, abnormalities in liver function tests. Patients may develop mouth sores, rash, diarrhea, and abnormalities in blood counts. The patient understands that monitoring is required including LFT's and blood counts.  There is a rare possibility of scarring of the liver and lung problems that can occur when taking methotrexate. Persistent nausea, loss of appetite, pale stools, dark urine, cough, and shortness of breath should be reported immediately. Patient advised to discontinue methotrexate treatment at least three months before attempting to become pregnant.  I discussed the need for folate supplements while taking methotrexate.  These supplements can decrease side effects during methotrexate treatment. The patient verbalized understanding of the proper use and possible adverse effects of methotrexate.  All of the patient's questions and concerns were addressed. Erythromycin Counseling:  I discussed with the patient the risks of erythromycin including but not limited to GI upset, allergic reaction, drug rash, diarrhea, increase in liver enzymes, and yeast infections. Spironolactone Pregnancy And Lactation Text: This medication can cause feminization of the male fetus and should be avoided during pregnancy. The active metabolite is also found in breast milk. Erythromycin Pregnancy And Lactation Text: This medication is Pregnancy Category B and is considered safe during pregnancy. It is also excreted in breast milk. Methotrexate Pregnancy And Lactation Text: This medication is Pregnancy Category X and is known to cause fetal harm. This medication is excreted in breast milk. Hydroxyzine Counseling: Patient advised that the medication is sedating and not to drive a car after taking this medication.  Patient informed of potential adverse effects including but not limited to dry mouth, urinary retention, and blurry vision.  The patient verbalized understanding of the proper use and possible adverse effects of hydroxyzine.  All of the patient's questions and concerns were addressed. Solaraze Counseling:  I discussed with the patient the risks of Solaraze including but not limited to erythema, scaling, itching, weeping, crusting, and pain. Solaraze Pregnancy And Lactation Text: This medication is Pregnancy Category B and is considered safe. There is some data to suggest avoiding during the third trimester. It is unknown if this medication is excreted in breast milk. Hydroxyzine Pregnancy And Lactation Text: This medication is not safe during pregnancy and should not be taken. It is also excreted in breast milk and breast feeding isn't recommended. Metronidazole Counseling:  I discussed with the patient the risks of metronidazole including but not limited to seizures, nausea/vomiting, a metallic taste in the mouth, nausea/vomiting and severe allergy. Humira Counseling:  I discussed with the patient the risks of adalimumab including but not limited to myelosuppression, immunosuppression, autoimmune hepatitis, demyelinating diseases, lymphoma, and serious infections.  The patient understands that monitoring is required including a PPD at baseline and must alert us or the primary physician if symptoms of infection or other concerning signs are noted. Prednisone Counseling:  I discussed with the patient the risks of prolonged use of prednisone including but not limited to weight gain, insomnia, osteoporosis, mood changes, diabetes, susceptibility to infection, glaucoma and high blood pressure.  In cases where prednisone use is prolonged, patients should be monitored with blood pressure checks, serum glucose levels and an eye exam.  Additionally, the patient may need to be placed on GI prophylaxis, PCP prophylaxis, and calcium and vitamin D supplementation and/or a bisphosphonate.  The patient verbalized understanding of the proper use and the possible adverse effects of prednisone.  All of the patient's questions and concerns were addressed. Gabapentin Counseling: I discussed with the patient the risks of gabapentin including but not limited to dizziness, somnolence, fatigue and ataxia. Tremfya Counseling: I discussed with the patient the risks of guselkumab including but not limited to immunosuppression, serious infections, worsening of inflammatory bowel disease and drug reactions.  The patient understands that monitoring is required including a PPD at baseline and must alert us or the primary physician if symptoms of infection or other concerning signs are noted. SSKI Counseling:  I discussed with the patient the risks of SSKI including but not limited to thyroid abnormalities, metallic taste, GI upset, fever, headache, acne, arthralgias, paraesthesias, lymphadenopathy, easy bleeding, arrhythmias, and allergic reaction. Drysol Counseling:  I discussed with the patient the risks of drysol/aluminum chloride including but not limited to skin rash, itching, irritation, burning. Drysol Pregnancy And Lactation Text: This medication is considered safe during pregnancy and breast feeding. Sski Pregnancy And Lactation Text: This medication is Pregnancy Category D and isn't considered safe during pregnancy. It is excreted in breast milk. Topical Retinoid counseling:  Patient advised to apply a pea-sized amount only at bedtime and wait 30 minutes after washing their face before applying.  If too drying, patient may add a non-comedogenic moisturizer. The patient verbalized understanding of the proper use and possible adverse effects of retinoids.  All of the patient's questions and concerns were addressed. Fluconazole Counseling:  Patient counseled regarding adverse effects of fluconazole including but not limited to headache, diarrhea, nausea, upset stomach, liver function test abnormalities, taste disturbance, and stomach pain.  There is a rare possibility of liver failure that can occur when taking fluconazole.  The patient understands that monitoring of LFTs and kidney function test may be required, especially at baseline. The patient verbalized understanding of the proper use and possible adverse effects of fluconazole.  All of the patient's questions and concerns were addressed. Metronidazole Pregnancy And Lactation Text: This medication is Pregnancy Category B and considered safe during pregnancy.  It is also excreted in breast milk. Glycopyrrolate Counseling:  I discussed with the patient the risks of glycopyrrolate including but not limited to skin rash, drowsiness, dry mouth, difficulty urinating, and blurred vision. Minocycline Counseling: Patient advised regarding possible photosensitivity and discoloration of the teeth, skin, lips, tongue and gums.  Patient instructed to avoid sunlight, if possible.  When exposed to sunlight, patients should wear protective clothing, sunglasses, and sunscreen.  The patient was instructed to call the office immediately if the following severe adverse effects occur:  hearing changes, easy bruising/bleeding, severe headache, or vision changes.  The patient verbalized understanding of the proper use and possible adverse effects of minocycline.  All of the patient's questions and concerns were addressed. Elidel Counseling: Patient may experience a mild burning sensation during topical application. Elidel is not approved in children less than 2 years of age. There have been case reports of hematologic and skin malignancies in patients using topical calcineurin inhibitors although causality is questionable. Albendazole Counseling:  I discussed with the patient the risks of albendazole including but not limited to cytopenia, kidney damage, nausea/vomiting and severe allergy.  The patient understands that this medication is being used in an off-label manner. Thalidomide Counseling: I discussed with the patient the risks of thalidomide including but not limited to birth defects, anxiety, weakness, chest pain, dizziness, cough and severe allergy. Tazorac Counseling:  Patient advised that medication is irritating and drying.  Patient may need to apply sparingly and wash off after an hour before eventually leaving it on overnight.  The patient verbalized understanding of the proper use and possible adverse effects of tazorac.  All of the patient's questions and concerns were addressed. Glycopyrrolate Pregnancy And Lactation Text: This medication is Pregnancy Category B and is considered safe during pregnancy. It is unknown if it is excreted breast milk. Ilumya Counseling: I discussed with the patient the risks of tildrakizumab including but not limited to immunosuppression, malignancy, posterior leukoencephalopathy syndrome, and serious infections.  The patient understands that monitoring is required including a PPD at baseline and must alert us or the primary physician if symptoms of infection or other concerning signs are noted. Xeljanz Counseling: I discussed with the patient the risks of Xeljanz therapy including increased risk of infection, liver issues, headache, diarrhea, or cold symptoms. Live vaccines should be avoided. They were instructed to call if they have any problems. Xelnickyz Pregnancy And Lactation Text: This medication is Pregnancy Category D and is not considered safe during pregnancy.  The risk during breast feeding is also uncertain. Acitretin Counseling:  I discussed with the patient the risks of acitretin including but not limited to hair loss, dry lips/skin/eyes, liver damage, hyperlipidemia, depression/suicidal ideation, photosensitivity.  Serious rare side effects can include but are not limited to pancreatitis, pseudotumor cerebri, bony changes, clot formation/stroke/heart attack.  Patient understands that alcohol is contraindicated since it can result in liver toxicity and significantly prolong the elimination of the drug by many years. Griseofulvin Counseling:  I discussed with the patient the risks of griseofulvin including but not limited to photosensitivity, cytopenia, liver damage, nausea/vomiting and severe allergy.  The patient understands that this medication is best absorbed when taken with a fatty meal (e.g., ice cream or french fries). Hydroxychloroquine Counseling:  I discussed with the patient that a baseline ophthalmologic exam is needed at the start of therapy and every year thereafter while on therapy. A CBC may also be warranted for monitoring.  The side effects of this medication were discussed with the patient, including but not limited to agranulocytosis, aplastic anemia, seizures, rashes, retinopathy, and liver toxicity. Patient instructed to call the office should any adverse effect occur.  The patient verbalized understanding of the proper use and possible adverse effects of Plaquenil.  All the patient's questions and concerns were addressed. Acitretin Pregnancy And Lactation Text: This medication is Pregnancy Category X and should not be given to women who are pregnant or may become pregnant in the future. This medication is excreted in breast milk. Valtrex Counseling: I discussed with the patient the risks of valacyclovir including but not limited to kidney damage, nausea, vomiting and severe allergy.  The patient understands that if the infection seems to be worsening or is not improving, they are to call. Ivermectin Counseling:  Patient instructed to take medication on an empty stomach with a full glass of water.  Patient informed of potential adverse effects including but not limited to nausea, diarrhea, dizziness, itching, and swelling of the extremities or lymph nodes.  The patient verbalized understanding of the proper use and possible adverse effects of ivermectin.  All of the patient's questions and concerns were addressed. Tazorac Pregnancy And Lactation Text: This medication is not safe during pregnancy. It is unknown if this medication is excreted in breast milk. Topical Clindamycin Counseling: Patient counseled that this medication may cause skin irritation or allergic reactions.  In the event of skin irritation, the patient was advised to reduce the amount of the drug applied or use it less frequently.   The patient verbalized understanding of the proper use and possible adverse effects of clindamycin.  All of the patient's questions and concerns were addressed. Eucrisa Counseling: Patient may experience a mild burning sensation during topical application. Eucrisa is not approved in children less than 2 years of age. Quinolones Counseling:  I discussed with the patient the risks of fluoroquinolones including but not limited to GI upset, allergic reaction, drug rash, diarrhea, dizziness, photosensitivity, yeast infections, liver function test abnormalities, tendonitis/tendon rupture. Infliximab Counseling:  I discussed with the patient the risks of infliximab including but not limited to myelosuppression, immunosuppression, autoimmune hepatitis, demyelinating diseases, lymphoma, and serious infections.  The patient understands that monitoring is required including a PPD at baseline and must alert us or the primary physician if symptoms of infection or other concerning signs are noted. Griseofulvin Pregnancy And Lactation Text: This medication is Pregnancy Category X and is known to cause serious birth defects. It is unknown if this medication is excreted in breast milk but breast feeding should be avoided. Finasteride Pregnancy And Lactation Text: This medication is absolutely contraindicated during pregnancy. It is unknown if it is excreted in breast milk. Rhofade Pregnancy And Lactation Text: This medication has not been assigned a Pregnancy Risk Category. It is unknown if the medication is excreted in breast milk. Dutasteride Pregnancy And Lactation Text: This medication is absolutely contraindicated in women, especially during pregnancy and breast feeding. Feminization of male fetuses is possible if taking while pregnant. Opioid Pregnancy And Lactation Text: These medications can lead to premature delivery and should be avoided during pregnancy. These medications are also present in breast milk in small amounts. Calcipotriene Pregnancy And Lactation Text: This medication has not been proven safe during pregnancy. It is unknown if this medication is excreted in breast milk. Tranexamic Acid Counseling:  Patient advised of the small risk of bleeding problems with tranexamic acid. They were also instructed to call if they developed any nausea, vomiting or diarrhea. All of the patient's questions and concerns were addressed. Propranolol Counseling:  I discussed with the patient the risks of propranolol including but not limited to low heart rate, low blood pressure, low blood sugar, restlessness and increased cold sensitivity. They should call the office if they experience any of these side effects. Calcipotriene Counseling:  I discussed with the patient the risks of calcipotriene including but not limited to erythema, scaling, itching, and irritation. Winlevi Pregnancy And Lactation Text: This medication is considered safe during pregnancy and breastfeeding. Azelaic Acid Counseling: Patient counseled that medicine may cause skin irritation and to avoid applying near the eyes.  In the event of skin irritation, the patient was advised to reduce the amount of the drug applied or use it less frequently.   The patient verbalized understanding of the proper use and possible adverse effects of azelaic acid.  All of the patient's questions and concerns were addressed. Libtayo Pregnancy And Lactation Text: This medication is contraindicated in pregnancy and when breast feeding. Propranolol Pregnancy And Lactation Text: This medication is Pregnancy Category C and it isn't known if it is safe during pregnancy. It is excreted in breast milk. Opioid Counseling: I discussed with the patient the potential side effects of opioids including but not limited to addiction, altered mental status, and depression. I stressed avoiding alcohol, benzodiazepines, muscle relaxants and sleep aids unless specifically okayed by a physician. The patient verbalized understanding of the proper use and possible adverse effects of opioids. All of the patient's questions and concerns were addressed. They were instructed to flush the remaining pills down the toilet if they did not need them for pain. Tranexamic Acid Pregnancy And Lactation Text: It is unknown if this medication is safe during pregnancy or breast feeding. Topical Ketoconazole Counseling: Patient counseled that this medication may cause skin irritation or allergic reactions.  In the event of skin irritation, the patient was advised to reduce the amount of the drug applied or use it less frequently.   The patient verbalized understanding of the proper use and possible adverse effects of ketoconazole.  All of the patient's questions and concerns were addressed. Wartpeel Counseling:  I discussed with the patient the risks of Wartpeel including but not limited to erythema, scaling, itching, weeping, crusting, and pain. Oral Minoxidil Pregnancy And Lactation Text: This medication should only be used when clearly needed if you are pregnant, attempting to become pregnant or breast feeding. Niacinamide Pregnancy And Lactation Text: These medications are considered safe during pregnancy. Dutasteride Counseling: Dustasteride Counseling:  I discussed with the patient the risks of use of dutasteride including but not limited to decreased libido, decreased ejaculate volume, and gynecomastia. Women who can become pregnant should not handle medication.  All of the patient's questions and concerns were addressed. Mirvaso Counseling: Mirvaso is a topical medication which can decrease superficial blood flow where applied. Side effects are uncommon and include stinging, redness and allergic reactions. Rhofade Counseling: Rhofade is a topical medication which can decrease superficial blood flow where applied. Side effects are uncommon and include stinging, redness and allergic reactions. Oral Minoxidil Counseling- I discussed with the patient the risks of oral minoxidil including but not limited to shortness of breath, swelling of the feet or ankles, dizziness, lightheadedness, unwanted hair growth and allergic reaction.  The patient verbalized understanding of the proper use and possible adverse effects of oral minoxidil.  All of the patient's questions and concerns were addressed. Winlevi Counseling:  I discussed with the patient the risks of topical clascoterone including but not limited to erythema, scaling, itching, and stinging. Patient voiced their understanding. Niacinamide Counseling: I recommended taking niacin or niacinamide, also know as vitamin B3, twice daily. Recent evidence suggests that taking vitamin B3 (500 mg twice daily) can reduce the risk of actinic keratoses and non-melanoma skin cancers. Side effects of vitamin B3 include flushing and headache. Finasteride Counseling:  I discussed with the patient the risks of use of finasteride including but not limited to decreased libido, decreased ejaculate volume, gynecomastia, and depression. Women should not handle medication.  All of the patient's questions and concerns were addressed. Libtayo Counseling- I discussed with the patient the risks of Libtayo including but not limited to nausea, vomiting, diarrhea, and bone or muscle pain.  The patient verbalized understanding of the proper use and possible adverse effects of Libtayo.  All of the patient's questions and concerns were addressed.

## 2022-09-22 NOTE — ASSESSMENT & PLAN NOTE
Home med: tradjenta  A1c: 6.5    - resume tradjenta on discharge    - Stable while in hospital      Detail Level: Zone Plan: Avoid makeup in that area while healing

## 2023-04-19 NOTE — PROGRESS NOTES
PHARMACY CONSULT NOTE: WARFARIN  Sisi Medel is a 58 y.o. female on warfarin therapy for Atrial Fibrillation. PharmD has been consulted for warfarin dosing.    Current order: 5mg daily  Home dose: 5mg daily  Coumadin clinic enrollment: Active  INR goal: 2-3    Lab Results   Component Value Date    INR 1.7 (H) 03/15/2019    INR 1.5 (H) 03/14/2019    INR 1.4 (H) 03/13/2019     Significant drug interactions: levofloxacin  Diet: Adult Diabetic without supplements     Recommendation(s):    INR 1.7 today, continue 5mg daily.    INR daily (ordered).   Pharmacy will continue to follow and monitor warfarin    Thank you for the consult,  Ly Bright, PharmD, BCPS  [id number]    **Note: Consults are reviewed Monday-Friday 7:00am-3:30pm. THE ABOVE RECOMMENDATIONS ARE ONLY SUGGESTED.THE RECOMMENDATIONS SHOULD BE CONSIDERED IN CONJUNCTION WITH ALL PATIENT FACTORS. **     difficulty d/t vision deficits

## 2023-05-03 NOTE — TELEPHONE ENCOUNTER
1225 pm:  Reviewed chart then returned call to pt.  Pt told me she has been having low BP's on and off for about 2 weeks. Pt reports bp's 70-upper 60's/48-50's and other times 120/76.  Pt reports she does get dizzy with these low BP's .  Pt receives dialysis on T/Th and Saturday's  BP's are sometime on dialysis/ with dialysis, and even on days she does not receive dialysis.  Due to this-they are not always able to take off enough fluid. Per chart she is not on any bp lowering medications: I asked pt to confirm this. Pt told me her Nephrologist started her on Midodrine 5 mg (3) tablets once daily to help raise her BP , but has not been as successful as he hoped and he is now recommending she see her Cardiologist to see if perhaps she is having worsening heart issues.  Pt also denies any OTC meds  Pt scheduled for first available appt in the dept: 3/29/19 @ 4:00 pm with labs at 3:00 pm.  Told pt best to have Dr. Avendano ( pt aware Dr. Chavarria with no appts sooner than this) evaluate her first then decide on testing she would like to proceed with-pt pleased and in agreement with this plan.  Also, advised pt if her bp goes to 70's or lower and/or she is dizzy, she should go to the nearest emergency room.  Pt agreed           ----- Message from Eugenia Brown sent at 3/8/2019 12:12 PM CST -----  Contact: Patient  The Pt is calling to see if she can get a ECHO because her BP is getting low. Please call her back @ [phone]. Thanks, Eugenia      (4) walks frequently

## 2023-05-04 NOTE — PROGRESS NOTES
Pt arrived to unit from dialysis via wheelchair aaox3. No sign of distressed noted; will continue to monitor.   Sent patient myChart msg.  Jenise Hooker, PharmD, [facility]  Medication Therapy Management Provider  Pager: [phone]

## 2023-07-26 NOTE — ED PROVIDER NOTES
Encounter Date: 2/17/2017    SCRIBE #1 NOTE: I, Carrie Gonzalez, am scribing for, and in the presence of, Dr. Prakash.       History     Chief Complaint   Patient presents with    Fatigue     Pt presents to ED via [facility] c/o generalized weakness and fatigue since 9am. Pt is on dialysis and was last dialyzed yesterday.      Review of patient's allergies indicates:   Allergen Reactions    Bactrim [sulfamethoxazole-trimethoprim] Other (See Comments)     Causes renal failure    Nsaids (non-steroidal anti-inflammatory drug) Other (See Comments)     Renal failure     HPI Comments: Time seen by provider: 11:10 AM    This is a 56 y.o. female who presents with complaint of generalized weakness and fatigue that began two hours ago. The weakness is worse with exertion, and she has trouble moving both of her feet. She complains of associated slurred speech, headache, and myalgias. She had a full session of dialysis yesterday. She stopped taking fludrocortisone yesterday.     The history is provided by the patient, a significant other and the EMS personnel.     Past Medical History   Diagnosis Date    Anemia in ESRD (end-stage renal disease) 3/7/2016    Anticoagulant long-term use     Cervical radiculopathy 7/20/2015    Chronic combined systolic and diastolic heart failure 6/14/2013     EF 20% 2013, improved with Medical therapy, negative PET 2013    Chronic respiratory failure with hypoxia 04/22/2013     On home oxygen at 2-5 liters    Closed fracture of proximal end of right fibula 6/27/2016    Complications due to renal dialysis device, implant, and graft     DDD (degenerative disc disease), lumbar 6/17/2015    ESRD on hemodialysis 2/23/2016    Essential hypertension     Gout     Lateral meniscal tear 5/31/2016    Lumbar stenosis 6/17/2015    Paroxysmal atrial fibrillation 5/16/2014     Not on anticoagulation    Peripheral neuropathy 11/13/2013    Personal history of gastric ulcer 3/19/2013    Sarcoidosis,  Last colonoscopy was with Dr. Manda Alcantara 12/2020. Result note:      Alex Hobson,     I wanted to summarize our discussion over the phone today. The biopsies obtained from your recent colonoscopy indicate that the polyp was hyperplastic. These are benign polyps without any pre-cancerous potential. As we also discussed the other polyps were small, but because they were not able to be sent to the lab, based on findings, we will schedule a colonoscopy within 1 year. We will send you a reminder card when the time of your procedure is near. Please let me know if you have any questions or concerns. Sincerely,  Lor Escobar MD        Patient is overdue for another colonoscopy.  Referral placed lung 2009     Diagnosed in  with ocular manifestation, on 4L home O2 and PO steroids    Secondary pulmonary hypertension 2015    Seizure disorder 3/19/2013    Shingles 2013    Thyroid disease      No past medical history pertinent negatives.  Past Surgical History   Procedure Laterality Date    Abdominal surgery      Vascular surgery       section       x2    Other surgical history       loop recorder implant     Family History   Problem Relation Age of Onset    Kidney failure Mother     Hypertension Mother     Diabetes Mother     Coronary artery disease Father     Hypertension Father     Diabetes Father     Lupus Sister     Diabetes Maternal Grandmother     Colon cancer Neg Hx     Ovarian cancer Neg Hx     Breast cancer Neg Hx     Melanoma Neg Hx      Social History   Substance Use Topics    Smoking status: Former Smoker     Packs/day: 0.50     Years: 10.00     Types: Cigarettes     Quit date: 1994    Smokeless tobacco: Never Used    Alcohol use No     Review of Systems   Constitutional: Positive for fatigue. Negative for chills and fever.   HENT: Negative for facial swelling.    Respiratory: Negative for shortness of breath.    Cardiovascular: Negative for chest pain.   Gastrointestinal: Negative for abdominal pain, nausea and vomiting.   Endocrine: Negative for polyuria.   Genitourinary: Negative for dysuria.   Musculoskeletal: Positive for myalgias.   Skin: Negative for rash.   Neurological: Positive for speech difficulty, weakness and headaches.       Physical Exam     Vitals:    17 2200 17 2215 17 2230 17 2245   BP: 119/60 (!) 116/56 (!) 116/53 (!) 117/54   Pulse: 82 80 76 78   Resp: (!) 27 16 14 12   Temp:       TempSrc:       SpO2: 100% 100% 100% 99%   Weight:       Height:        17 2300   BP: (!) 129/56   Pulse: 74   Resp: 13   Temp: 98.4 °F (36.9 °C)   TempSrc: Oral   SpO2: 99%   Weight:    Height:       Physical  Exam    Nursing note and vitals reviewed.  Constitutional: She appears well-developed and well-nourished. She is not diaphoretic. She appears distressed.   Obese.    HENT:   Head: Normocephalic and atraumatic.   Right Ear: External ear normal.   Left Ear: External ear normal.   Dry mucous membranes.   Eyes: EOM are normal. Right eye exhibits no discharge. Left eye exhibits no discharge.   Neck: Normal range of motion.   Cardiovascular: Normal rate, regular rhythm and normal heart sounds. Exam reveals no gallop and no friction rub.    No murmur heard.  Pulmonary/Chest: Breath sounds normal. No respiratory distress. She has no wheezes. She has no rhonchi. She has no rales.   Abdominal: Soft. There is no tenderness. There is no rebound and no guarding.   Musculoskeletal: Normal range of motion. She exhibits no edema or tenderness.   Left upper extremity AV fistula. No palpable thrill.   Moving all extremities.    Neurological:   Responding to questions but confused, mild slurred speech, not oriented to place nor circumstances.    Skin: Skin is warm and dry. No rash and no abscess noted. No erythema. No pallor.   Psychiatric:   Emotionally labile, confused and anxcious.         ED Course   Critical Care  Date/Time: 2/17/2017 9:23 AM  Performed by: GRETA GOULD  Authorized by: ELSY PARSONS   Direct patient critical care time: 30 minutes  Additional history critical care time: 3 minutes  Documentation critical care time: 5 minutes  Consulting other physicians critical care time: 5 minutes  Total critical care time (exclusive of procedural time) : 43 minutes  Critical care was necessary to treat or prevent imminent or life-threatening deterioration of the following conditions: circulatory failure, endocrine crisis, renal failure and CNS failure or compromise.  Critical care was time spent personally by me on the following activities: ordering and review of radiographic studies, pulse oximetry, re-evaluation of  patient's condition, review of old charts, obtaining history from patient or surrogate, examination of patient, ordering and performing treatments and interventions, development of treatment plan with patient or surrogate and evaluation of patient's response to treatment.        Labs Reviewed   CBC W/ AUTO DIFFERENTIAL - Abnormal; Notable for the following:        Result Value    WBC 12.78 (*)     RBC 3.23 (*)     Hemoglobin 9.7 (*)     Hematocrit 32.2 (*)      (*)     MCHC 30.1 (*)     RDW 15.1 (*)     Platelets 357 (*)     Gran # 9.4 (*)     Mono # 1.2 (*)     Gran% 73.6 (*)     Lymph% 15.5 (*)     All other components within normal limits   COMPREHENSIVE METABOLIC PANEL - Abnormal; Notable for the following:     Sodium 135 (*)     Chloride 92 (*)     Glucose 153 (*)     Creatinine 6.0 (*)     Albumin 3.0 (*)     ALT 8 (*)     eGFR if  8 (*)     eGFR if non  7 (*)     All other components within normal limits   LACTIC ACID, PLASMA - Abnormal; Notable for the following:     Lactate (Lactic Acid) 2.8 (*)     All other components within normal limits   PROTIME-INR - Abnormal; Notable for the following:     Prothrombin Time 21.3 (*)     INR 2.0 (*)     All other components within normal limits   CORTISOL, RANDOM   PROCALCITONIN        Imaging Results         CT Head Without Contrast (Final result) Result time:  02/17/17 13:11:56    Final result by Loni Singh MD (02/17/17 13:11:56)    Impression:     No acute intracranial abnormality.        Electronically signed by: Loni Singh MD  Date:     02/17/17  Time:    13:11     Narrative:    Comparison 2015.  There is slight limitation by patient motion at the skull base.    The ventricular system is normal in size for age and not distorted by mass effect.    There is no parenchymal or extra-axial hemorrhage.  No finding to indicate neoplasm is present on this noncontrast study.  There is no major vascular territory region of  acute or remote infarct.  Visualized paranasal sinuses  are clear .            X-Ray Chest 1 View (Final result) Result time:  02/17/17 12:38:58    Final result by Cleveland Corbin MD (02/17/17 12:38:58)    Impression:        Stable findings of interstitial edema and pulmonary vascular congestion.      Electronically signed by: CLEVELAND CORBIN MD  Date:     02/17/17  Time:    12:38     Narrative:    PORTABLE AP CHEST:      Comparison: 2/4/17    Findings:     Stable findings of interstitial edema and pulmonary vascular congestion.Central line has been removed. There is no pleural effusion or pneumothorax. The cardiac silhouette isunchanged in size.  There are no acute bony abnormalities.             EKG Readings: (Independently Interpreted)   Initial Reading: No STEMI.   Demand pacer. Rate approximately 77. Two PVCs. Low voltage throughout. No STEMI.        X-Rays:   Independently Interpreted Readings:   Chest X-Ray: Cardiomegaly. No infiltrate. No pneumothorax.      Medical Decision Making:   Initial Assessment:   Urgent evaluation a 56-year-old female with history of pulmonary sarcoidosis on home O2 and steroids, CHF, paroxysmal atrial fibrillation with pacemaker, seizure disorder, end-stage renal disease with last session yesterday, adrenal insufficiency last admitted to the MICU for hypotension 2/4 discharged 2/11.  Patient has multiple admissions for hypotension requiring stress dose steroids, and was taken off of Florinef yesterday. On exam pt is intermittently somnolent, but arousable and confused, mild slurred speech, but moving all extremities without facial droop, otherwise non toxic, and without respiratory distress. Differential includes altered mental status secondary to hypotension, cva, metabolic disturbances, sepsis, adrenal insufficiency, volume depletion.  We'll obtain labs, administer stress dose steroids, small fluid bolus and reassess.  Differential Diagnosis:   1:48 PM  Head CT without acute  pathology  Pt now feeling improved after fluid bolus and steroids with return in baseline mentation and speech, doubt elevated lactate is infectious, more likely from volume depletion. But given persistent hypotension 71/50 despite 500cc bolus x 2, will admit for slow volume replacement, likely endocrine eval regarding steroid requirements..   Clinical Tests:   Lab Tests: Ordered and Reviewed  Radiological Study: Ordered and Reviewed  Medical Tests: Ordered and Reviewed  ED Management:  1:28 PM: Discussed the case with Zen Joiner PA-C with the hospitalist service. Patient will be admitted to Dr. Ponce's service.     Additional MDM:   EKG: I have independently interpreted EKG(s) - see notes.   X-Rays: I have independently interpreted X-Ray(s) - see notes.          Scribe Attestation:   Scribe #1: I performed the above scribed service and the documentation accurately describes the services I performed. I attest to the accuracy of the note.    Attending Attestation:           Physician Attestation for Scribe:  Physician Attestation Statement for Scribe #1: I, Dr. Prakash, reviewed documentation, as scribed by Carrie Gonzalez in my presence, and it is both accurate and complete.                 ED Course     Clinical Impression:     1. Hypotension, unspecified hypotension type    2. Weakness    3. Altered mental status       Disposition:   Disposition: Admitted  Condition: Critical       Tiffani Prakash MD  02/17/17 1350       Tiffani Prakash MD  02/18/17 5950
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
## 2024-04-01 NOTE — TELEPHONE ENCOUNTER
----- Message from Torrey Villafana MD sent at 5/16/2018 11:31 AM CDT -----  Thank you for the information, Maxx.  Yes, we can get that scheduled, I'd clearly like to avoid thrombosis.      Silvia, let's set this up for tomorrow (5/17) or Friday (5/18).    Thank you    Torrey Villafana MD  Vascular & Endovascular Surgery         ----- Message -----  From: Maxx Mercado MD  Sent: 5/15/2018  10:38 AM  To: Torrey Villafana MD    [facility]    I am the nephrologist who cares for bree esteves at [facility].  Her venous pressures have consistently remained over >300 typically in the 330-350 range.  I am concerned that she has recurrent outflow stenosis, and concern about recirculation.  I know you violeta recently did an ultrasound, but I would like to get her scheduled for a fistulogram for evaluation.  Would you be willing to help us get this scheduled with you violeta?     Thanks  Maxx     Please inform stress test and echo were normal.  Thank you**

## 2024-09-24 NOTE — ASSESSMENT & PLAN NOTE
Clotted AV Fistula, De-clotting 2/7/2017  Nephrology following  Temporary line in place for HD to be removed now that AVG is functioning.   .

## (undated) DEVICE — SHEATH PINNACLE 7FR HIFLO

## (undated) DEVICE — CATH BLLN DORADO 8-4

## (undated) DEVICE — KIT INTRO MICRO NIT VSI 4FR

## (undated) DEVICE — COVERS PROBE NR-48 STERILE

## (undated) DEVICE — SHEATH PINNACLE 6FR HIFLO

## (undated) DEVICE — SHEATH PINNACLE 6FRX6CM

## (undated) DEVICE — SET DECANTER MEDICHOICE

## (undated) DEVICE — CATH DORADO 8X6

## (undated) DEVICE — CATH GLIDE ANGLED 5FR 65CM

## (undated) DEVICE — INFLATOR ENCORE

## (undated) DEVICE — CATH LUTONIX DCB 035X75X9X60

## (undated) DEVICE — VISE RADIFOCUS MULTI TORQUE

## (undated) DEVICE — DRAPE OPTIMA MAJOR PEDIATRIC

## (undated) DEVICE — INFLATOR ENCORE 26 BLLN INFL

## (undated) DEVICE — SOL NS 1000CC

## (undated) DEVICE — SYS LABEL CORRECT MED

## (undated) DEVICE — Device

## (undated) DEVICE — VISIPAQUE 320 200ML +PK

## (undated) DEVICE — GLIDE CATH ANGLED 4FR 65CM

## (undated) DEVICE — TUBING HIGH PRESSURE

## (undated) DEVICE — INFLATION DEVICE ENCORE 26

## (undated) DEVICE — SEE MEDLINE ITEM 156894

## (undated) DEVICE — APPLICATOR CHLORAPREP ORN 26ML

## (undated) DEVICE — KIT POWER PULSE

## (undated) DEVICE — COVER INSTR ELASTIC BAND 40X20

## (undated) DEVICE — CATH BLLN DURADO 7 X 4

## (undated) DEVICE — KIT MICROINTRO 4F .018X40X7CM

## (undated) DEVICE — DRAPE PLASTIC U 60X72

## (undated) DEVICE — GUIDEWIRE STD .035X180CM ANG

## (undated) DEVICE — GUIDEWIRE STF .035X180CM ANG

## (undated) DEVICE — SET MICROPUNCTURE

## (undated) DEVICE — BLLN MUSTANG 5 X 20 X 75

## (undated) DEVICE — OMNIPAQUE 350 200ML

## (undated) DEVICE — GAUZE SPONGE 4'X4 12 PLY

## (undated) DEVICE — INTRODUCER SHEATH PINNACLE 8FR

## (undated) DEVICE — CATH ANGIOJET PROXI-90CM

## (undated) DEVICE — CATH DORADO 9X4

## (undated) DEVICE — CATH DORADO 10X4

## (undated) DEVICE — GUIDEWIRE ANG STF .035INX18CM

## (undated) DEVICE — DRESSING TRANS 4X4 TEGADERM

## (undated) DEVICE — SYR MED RAD 150ML

## (undated) DEVICE — BLLN MUSTANG 6 X 20 X 75

## (undated) DEVICE — BLLN DORADO 10X2 6FR

## (undated) DEVICE — CATH BLLN DORADO 6 X 4

## (undated) DEVICE — CATH FOGARTY THRU-LUMENARTERIA

## (undated) DEVICE — SET MICROPUNCT 5FRMPIS-501

## (undated) DEVICE — KIT GLIDESHEATH SLEND 6FR 10CM